# Patient Record
Sex: FEMALE | Race: WHITE | NOT HISPANIC OR LATINO | Employment: OTHER | URBAN - METROPOLITAN AREA
[De-identification: names, ages, dates, MRNs, and addresses within clinical notes are randomized per-mention and may not be internally consistent; named-entity substitution may affect disease eponyms.]

---

## 2017-01-03 ENCOUNTER — APPOINTMENT (OUTPATIENT)
Dept: LAB | Facility: CLINIC | Age: 67
End: 2017-01-03
Payer: MEDICARE

## 2017-01-03 DIAGNOSIS — C34.91 MALIGNANT NEOPLASM OF UNSPECIFIED PART OF RIGHT BRONCHUS OR LUNG (HCC): ICD-10-CM

## 2017-01-03 LAB
T3FREE SERPL-MCNC: 2.29 PG/ML (ref 2.3–4.2)
T4 FREE SERPL-MCNC: 1.51 NG/DL (ref 0.76–1.46)
TSH SERPL DL<=0.05 MIU/L-ACNC: 1.39 UIU/ML (ref 0.36–3.74)

## 2017-01-03 PROCEDURE — 84481 FREE ASSAY (FT-3): CPT

## 2017-01-03 PROCEDURE — 36415 COLL VENOUS BLD VENIPUNCTURE: CPT

## 2017-01-03 PROCEDURE — 84439 ASSAY OF FREE THYROXINE: CPT

## 2017-01-03 PROCEDURE — 84443 ASSAY THYROID STIM HORMONE: CPT

## 2017-01-04 ENCOUNTER — ALLSCRIPTS OFFICE VISIT (OUTPATIENT)
Dept: OTHER | Facility: OTHER | Age: 67
End: 2017-01-04

## 2017-01-05 ENCOUNTER — HOSPITAL ENCOUNTER (OUTPATIENT)
Dept: INFUSION CENTER | Facility: HOSPITAL | Age: 67
Discharge: HOME | End: 2017-01-05
Payer: MEDICARE

## 2017-01-05 ENCOUNTER — APPOINTMENT (OUTPATIENT)
Dept: RADIATION ONCOLOGY | Facility: HOSPITAL | Age: 67
End: 2017-01-05
Attending: RADIOLOGY
Payer: MEDICARE

## 2017-01-05 ENCOUNTER — GENERIC CONVERSION - ENCOUNTER (OUTPATIENT)
Dept: OTHER | Facility: OTHER | Age: 67
End: 2017-01-05

## 2017-01-05 VITALS
TEMPERATURE: 97.9 F | SYSTOLIC BLOOD PRESSURE: 137 MMHG | HEART RATE: 80 BPM | DIASTOLIC BLOOD PRESSURE: 75 MMHG | RESPIRATION RATE: 18 BRPM

## 2017-01-05 PROCEDURE — 96413 CHEMO IV INFUSION 1 HR: CPT

## 2017-01-05 PROCEDURE — 99213 OFFICE O/P EST LOW 20 MIN: CPT | Performed by: RADIOLOGY

## 2017-01-05 RX ORDER — SODIUM CHLORIDE 9 MG/ML
20 INJECTION, SOLUTION INTRAVENOUS ONCE
Status: COMPLETED | OUTPATIENT
Start: 2017-01-05 | End: 2017-01-05

## 2017-01-05 RX ORDER — SODIUM CHLORIDE 9 MG/ML
20 INJECTION, SOLUTION INTRAVENOUS ONCE
Status: DISCONTINUED | OUTPATIENT
Start: 2017-01-05 | End: 2017-01-05

## 2017-01-05 RX ADMIN — SODIUM CHLORIDE 20 ML/HR: 0.9 INJECTION, SOLUTION INTRAVENOUS at 11:30

## 2017-01-05 RX ADMIN — SODIUM CHLORIDE 200 MG: 900 INJECTION INTRAVENOUS at 11:27

## 2017-01-05 NOTE — PLAN OF CARE
Problem: Potential for Falls  Goal: Patient will remain free of falls  INTERVENTIONS:  - Assess patient frequently for physical needs  - Identify cognitive and physical deficits and behaviors that affect risk of falls    - [place] fall precautions as indicated by assessment   - Educate patient/family on patient safety including physical limitations  - Instruct patient to call for assistance with activity based on assessment  - Modify environment to reduce risk of injury  - Consider OT/PT consult to assist with strengthening/mobility   Outcome: Progressing

## 2017-01-10 ENCOUNTER — GENERIC CONVERSION - ENCOUNTER (OUTPATIENT)
Dept: OTHER | Facility: OTHER | Age: 67
End: 2017-01-10

## 2017-01-11 ENCOUNTER — APPOINTMENT (OUTPATIENT)
Dept: LAB | Facility: CLINIC | Age: 67
End: 2017-01-11
Payer: MEDICARE

## 2017-01-11 ENCOUNTER — GENERIC CONVERSION - ENCOUNTER (OUTPATIENT)
Dept: OTHER | Facility: OTHER | Age: 67
End: 2017-01-11

## 2017-01-11 ENCOUNTER — ALLSCRIPTS OFFICE VISIT (OUTPATIENT)
Dept: OTHER | Facility: OTHER | Age: 67
End: 2017-01-11

## 2017-01-11 ENCOUNTER — TRANSCRIBE ORDERS (OUTPATIENT)
Dept: LAB | Facility: CLINIC | Age: 67
End: 2017-01-11

## 2017-01-11 DIAGNOSIS — E11.9 TYPE 2 DIABETES MELLITUS WITHOUT COMPLICATIONS (HCC): ICD-10-CM

## 2017-01-11 LAB
EST. AVERAGE GLUCOSE BLD GHB EST-MCNC: 154 MG/DL
HBA1C MFR BLD: 7 % (ref 4.2–6.3)

## 2017-01-11 PROCEDURE — 36415 COLL VENOUS BLD VENIPUNCTURE: CPT

## 2017-01-11 PROCEDURE — 83036 HEMOGLOBIN GLYCOSYLATED A1C: CPT

## 2017-01-13 ENCOUNTER — GENERIC CONVERSION - ENCOUNTER (OUTPATIENT)
Dept: OTHER | Facility: OTHER | Age: 67
End: 2017-01-13

## 2017-01-18 ENCOUNTER — GENERIC CONVERSION - ENCOUNTER (OUTPATIENT)
Dept: OTHER | Facility: OTHER | Age: 67
End: 2017-01-18

## 2017-01-20 ENCOUNTER — ALLSCRIPTS OFFICE VISIT (OUTPATIENT)
Dept: OTHER | Facility: OTHER | Age: 67
End: 2017-01-20

## 2017-01-23 ENCOUNTER — APPOINTMENT (OUTPATIENT)
Dept: LAB | Facility: CLINIC | Age: 67
End: 2017-01-23
Payer: MEDICARE

## 2017-01-23 DIAGNOSIS — F41.9 ANXIETY DISORDER: ICD-10-CM

## 2017-01-23 DIAGNOSIS — C34.91 MALIGNANT NEOPLASM OF UNSPECIFIED PART OF RIGHT BRONCHUS OR LUNG (HCC): ICD-10-CM

## 2017-01-23 LAB
ALBUMIN SERPL BCP-MCNC: 2.9 G/DL (ref 3.5–5)
ALP SERPL-CCNC: 78 U/L (ref 46–116)
ALT SERPL W P-5'-P-CCNC: 17 U/L (ref 12–78)
ANION GAP SERPL CALCULATED.3IONS-SCNC: 8 MMOL/L (ref 4–13)
AST SERPL W P-5'-P-CCNC: 14 U/L (ref 5–45)
BASOPHILS # BLD AUTO: 0.03 THOUSANDS/ΜL (ref 0–0.1)
BASOPHILS NFR BLD AUTO: 1 % (ref 0–1)
BILIRUB SERPL-MCNC: 0.69 MG/DL (ref 0.2–1)
BUN SERPL-MCNC: 12 MG/DL (ref 5–25)
CALCIUM SERPL-MCNC: 9.1 MG/DL (ref 8.3–10.1)
CHLORIDE SERPL-SCNC: 103 MMOL/L (ref 100–108)
CO2 SERPL-SCNC: 27 MMOL/L (ref 21–32)
CREAT SERPL-MCNC: 0.72 MG/DL (ref 0.6–1.3)
EOSINOPHIL # BLD AUTO: 0.09 THOUSAND/ΜL (ref 0–0.61)
EOSINOPHIL NFR BLD AUTO: 1 % (ref 0–6)
ERYTHROCYTE [DISTWIDTH] IN BLOOD BY AUTOMATED COUNT: 16 % (ref 11.6–15.1)
GFR SERPL CREATININE-BSD FRML MDRD: >60 ML/MIN/1.73SQ M
GLUCOSE SERPL-MCNC: 158 MG/DL (ref 65–140)
HCT VFR BLD AUTO: 38.9 % (ref 34.8–46.1)
HGB BLD-MCNC: 12.2 G/DL (ref 11.5–15.4)
LYMPHOCYTES # BLD AUTO: 0.44 THOUSANDS/ΜL (ref 0.6–4.47)
LYMPHOCYTES NFR BLD AUTO: 7 % (ref 14–44)
MCH RBC QN AUTO: 25.6 PG (ref 26.8–34.3)
MCHC RBC AUTO-ENTMCNC: 31.4 G/DL (ref 31.4–37.4)
MCV RBC AUTO: 82 FL (ref 82–98)
MONOCYTES # BLD AUTO: 0.53 THOUSAND/ΜL (ref 0.17–1.22)
MONOCYTES NFR BLD AUTO: 8 % (ref 4–12)
NEUTROPHILS # BLD AUTO: 5.26 THOUSANDS/ΜL (ref 1.85–7.62)
NEUTS SEG NFR BLD AUTO: 83 % (ref 43–75)
NRBC BLD AUTO-RTO: 0 /100 WBCS
PLATELET # BLD AUTO: 265 THOUSANDS/UL (ref 149–390)
PMV BLD AUTO: 11.4 FL (ref 8.9–12.7)
POTASSIUM SERPL-SCNC: 3.6 MMOL/L (ref 3.5–5.3)
PROT SERPL-MCNC: 6.7 G/DL (ref 6.4–8.2)
RBC # BLD AUTO: 4.77 MILLION/UL (ref 3.81–5.12)
SODIUM SERPL-SCNC: 138 MMOL/L (ref 136–145)
WBC # BLD AUTO: 6.36 THOUSAND/UL (ref 4.31–10.16)

## 2017-01-23 PROCEDURE — 36415 COLL VENOUS BLD VENIPUNCTURE: CPT

## 2017-01-23 PROCEDURE — 85025 COMPLETE CBC W/AUTO DIFF WBC: CPT

## 2017-01-23 PROCEDURE — 80053 COMPREHEN METABOLIC PANEL: CPT

## 2017-01-25 ENCOUNTER — ALLSCRIPTS OFFICE VISIT (OUTPATIENT)
Dept: OTHER | Facility: OTHER | Age: 67
End: 2017-01-25

## 2017-01-26 ENCOUNTER — HOSPITAL ENCOUNTER (OUTPATIENT)
Dept: INFUSION CENTER | Facility: CLINIC | Age: 67
Discharge: HOME | End: 2017-01-26
Payer: MEDICARE

## 2017-01-26 VITALS
BODY MASS INDEX: 29.32 KG/M2 | SYSTOLIC BLOOD PRESSURE: 124 MMHG | RESPIRATION RATE: 16 BRPM | WEIGHT: 171.74 LBS | HEIGHT: 64 IN | DIASTOLIC BLOOD PRESSURE: 50 MMHG | HEART RATE: 57 BPM | TEMPERATURE: 97.5 F | OXYGEN SATURATION: 97 %

## 2017-01-26 PROCEDURE — 96413 CHEMO IV INFUSION 1 HR: CPT

## 2017-01-26 RX ORDER — SODIUM CHLORIDE 9 MG/ML
20 INJECTION, SOLUTION INTRAVENOUS CONTINUOUS
Status: DISCONTINUED | OUTPATIENT
Start: 2017-01-26 | End: 2017-01-29 | Stop reason: HOSPADM

## 2017-01-26 RX ORDER — ALBUTEROL SULFATE 90 UG/1
2 AEROSOL, METERED RESPIRATORY (INHALATION) EVERY 6 HOURS PRN
COMMUNITY
End: 2017-03-30 | Stop reason: ALTCHOICE

## 2017-01-26 RX ORDER — DOXYCYCLINE HYCLATE 100 MG/1
100 CAPSULE ORAL DAILY
COMMUNITY
End: 2017-03-30 | Stop reason: ALTCHOICE

## 2017-01-26 RX ADMIN — SODIUM CHLORIDE 20 ML/HR: 0.9 INJECTION, SOLUTION INTRAVENOUS at 10:50

## 2017-01-26 RX ADMIN — SODIUM CHLORIDE 200 MG: 9 INJECTION, SOLUTION INTRAVENOUS at 11:20

## 2017-01-26 NOTE — PLAN OF CARE
Problem: INFECTION - ADULT  Goal: Absence or prevention of progression during hospitalization  INTERVENTIONS:  - Assess and monitor for signs and symptoms of infection  - Monitor lab/diagnostic results  - Monitor all insertion sites, i e  indwelling lines, tubes, and drains  - Monitor endotracheal (as able) and nasal secretions for changes in amount and color  - [place] appropriate cooling/warming therapies per order  - Administer medications as ordered  - Instruct and encourage patient and family to use good hand hygiene technique  - Identify and instruct in appropriate isolation precautions for identified infection/condition  Outcome: Progressing  Goal: Absence of fever/infection during neutropenic period  INTERVENTIONS:  - Monitor WBC  - Implement neutropenic guidelines  Outcome: Progressing    Problem: Knowledge Deficit  Goal: Patient/family/caregiver demonstrates understanding of disease process, treatment plan, medications, and discharge instructions  Complete learning assessment and assess knowledge base    Interventions:  - Provide teaching at level of understanding  - Provide teaching via preferred learning methods  Outcome: Progressing

## 2017-01-27 ENCOUNTER — GENERIC CONVERSION - ENCOUNTER (OUTPATIENT)
Dept: OTHER | Facility: OTHER | Age: 67
End: 2017-01-27

## 2017-01-27 LAB
LEFT EYE DIABETIC RETINOPATHY: NORMAL
RIGHT EYE DIABETIC RETINOPATHY: NORMAL

## 2017-01-31 ENCOUNTER — HOSPITAL ENCOUNTER (OUTPATIENT)
Dept: CT IMAGING | Facility: HOSPITAL | Age: 67
Discharge: HOME | End: 2017-01-31
Attending: INTERNAL MEDICINE
Payer: MEDICARE

## 2017-01-31 DIAGNOSIS — C34.91 MALIGNANT NEOPLASM OF UNSPECIFIED PART OF RIGHT BRONCHUS OR LUNG (HCC): ICD-10-CM

## 2017-01-31 PROCEDURE — 71250 CT THORAX DX C-: CPT

## 2017-02-13 ENCOUNTER — APPOINTMENT (OUTPATIENT)
Dept: LAB | Facility: CLINIC | Age: 67
End: 2017-02-13
Payer: MEDICARE

## 2017-02-13 DIAGNOSIS — C34.91 MALIGNANT NEOPLASM OF UNSPECIFIED PART OF RIGHT BRONCHUS OR LUNG (HCC): ICD-10-CM

## 2017-02-13 LAB
ALBUMIN SERPL BCP-MCNC: 3 G/DL (ref 3.5–5)
ALP SERPL-CCNC: 78 U/L (ref 46–116)
ALT SERPL W P-5'-P-CCNC: 18 U/L (ref 12–78)
ANION GAP SERPL CALCULATED.3IONS-SCNC: 5 MMOL/L (ref 4–13)
AST SERPL W P-5'-P-CCNC: 13 U/L (ref 5–45)
BASOPHILS # BLD AUTO: 0.03 THOUSANDS/ΜL (ref 0–0.1)
BASOPHILS NFR BLD AUTO: 1 % (ref 0–1)
BILIRUB SERPL-MCNC: 0.4 MG/DL (ref 0.2–1)
BUN SERPL-MCNC: 16 MG/DL (ref 5–25)
CALCIUM SERPL-MCNC: 9.3 MG/DL (ref 8.3–10.1)
CEA SERPL-MCNC: 1 NG/ML (ref 0–3)
CHLORIDE SERPL-SCNC: 103 MMOL/L (ref 100–108)
CO2 SERPL-SCNC: 29 MMOL/L (ref 21–32)
CREAT SERPL-MCNC: 0.73 MG/DL (ref 0.6–1.3)
EOSINOPHIL # BLD AUTO: 0.14 THOUSAND/ΜL (ref 0–0.61)
EOSINOPHIL NFR BLD AUTO: 2 % (ref 0–6)
ERYTHROCYTE [DISTWIDTH] IN BLOOD BY AUTOMATED COUNT: 15.9 % (ref 11.6–15.1)
GFR SERPL CREATININE-BSD FRML MDRD: >60 ML/MIN/1.73SQ M
GLUCOSE SERPL-MCNC: 118 MG/DL (ref 65–140)
HCT VFR BLD AUTO: 39.9 % (ref 34.8–46.1)
HGB BLD-MCNC: 12.5 G/DL (ref 11.5–15.4)
LYMPHOCYTES # BLD AUTO: 0.68 THOUSANDS/ΜL (ref 0.6–4.47)
LYMPHOCYTES NFR BLD AUTO: 10 % (ref 14–44)
MCH RBC QN AUTO: 25.4 PG (ref 26.8–34.3)
MCHC RBC AUTO-ENTMCNC: 31.3 G/DL (ref 31.4–37.4)
MCV RBC AUTO: 81 FL (ref 82–98)
MONOCYTES # BLD AUTO: 0.44 THOUSAND/ΜL (ref 0.17–1.22)
MONOCYTES NFR BLD AUTO: 7 % (ref 4–12)
NEUTROPHILS # BLD AUTO: 5.34 THOUSANDS/ΜL (ref 1.85–7.62)
NEUTS SEG NFR BLD AUTO: 80 % (ref 43–75)
NRBC BLD AUTO-RTO: 0 /100 WBCS
PLATELET # BLD AUTO: 274 THOUSANDS/UL (ref 149–390)
PMV BLD AUTO: 11.1 FL (ref 8.9–12.7)
POTASSIUM SERPL-SCNC: 4.7 MMOL/L (ref 3.5–5.3)
PROT SERPL-MCNC: 6.8 G/DL (ref 6.4–8.2)
RBC # BLD AUTO: 4.92 MILLION/UL (ref 3.81–5.12)
SODIUM SERPL-SCNC: 137 MMOL/L (ref 136–145)
WBC # BLD AUTO: 6.66 THOUSAND/UL (ref 4.31–10.16)

## 2017-02-13 PROCEDURE — 82378 CARCINOEMBRYONIC ANTIGEN: CPT

## 2017-02-13 PROCEDURE — 36415 COLL VENOUS BLD VENIPUNCTURE: CPT

## 2017-02-13 PROCEDURE — 80053 COMPREHEN METABOLIC PANEL: CPT

## 2017-02-13 PROCEDURE — 85025 COMPLETE CBC W/AUTO DIFF WBC: CPT

## 2017-02-15 ENCOUNTER — ALLSCRIPTS OFFICE VISIT (OUTPATIENT)
Dept: OTHER | Facility: OTHER | Age: 67
End: 2017-02-15

## 2017-02-15 DIAGNOSIS — C34.91 MALIGNANT NEOPLASM OF UNSPECIFIED PART OF RIGHT BRONCHUS OR LUNG (HCC): ICD-10-CM

## 2017-02-15 RX ORDER — SODIUM CHLORIDE 9 MG/ML
20 INJECTION, SOLUTION INTRAVENOUS CONTINUOUS
Status: DISCONTINUED | OUTPATIENT
Start: 2017-02-16 | End: 2017-02-19 | Stop reason: HOSPADM

## 2017-02-16 ENCOUNTER — HOSPITAL ENCOUNTER (OUTPATIENT)
Dept: INFUSION CENTER | Facility: CLINIC | Age: 67
Discharge: HOME | End: 2017-02-16
Payer: MEDICARE

## 2017-02-16 VITALS
BODY MASS INDEX: 30.29 KG/M2 | OXYGEN SATURATION: 98 % | HEART RATE: 64 BPM | HEIGHT: 64 IN | SYSTOLIC BLOOD PRESSURE: 118 MMHG | WEIGHT: 177.4 LBS | DIASTOLIC BLOOD PRESSURE: 62 MMHG | TEMPERATURE: 97.4 F | RESPIRATION RATE: 18 BRPM

## 2017-02-16 PROCEDURE — 96413 CHEMO IV INFUSION 1 HR: CPT

## 2017-02-16 RX ADMIN — SODIUM CHLORIDE 200 MG: 9 INJECTION, SOLUTION INTRAVENOUS at 10:54

## 2017-02-16 RX ADMIN — SODIUM CHLORIDE 20 ML/HR: 900 INJECTION, SOLUTION INTRAVENOUS at 09:50

## 2017-03-06 ENCOUNTER — APPOINTMENT (OUTPATIENT)
Dept: LAB | Facility: CLINIC | Age: 67
End: 2017-03-06
Payer: MEDICARE

## 2017-03-06 ENCOUNTER — TRANSCRIBE ORDERS (OUTPATIENT)
Dept: LAB | Facility: CLINIC | Age: 67
End: 2017-03-06

## 2017-03-06 DIAGNOSIS — C34.91 MALIGNANT NEOPLASM OF UNSPECIFIED PART OF RIGHT BRONCHUS OR LUNG (HCC): ICD-10-CM

## 2017-03-06 LAB
ALBUMIN SERPL BCP-MCNC: 2.7 G/DL (ref 3.5–5)
ALP SERPL-CCNC: 90 U/L (ref 46–116)
ALT SERPL W P-5'-P-CCNC: 19 U/L (ref 12–78)
ANION GAP SERPL CALCULATED.3IONS-SCNC: 6 MMOL/L (ref 4–13)
AST SERPL W P-5'-P-CCNC: 16 U/L (ref 5–45)
BASOPHILS # BLD AUTO: 0.03 THOUSANDS/ΜL (ref 0–0.1)
BASOPHILS NFR BLD AUTO: 0 % (ref 0–1)
BILIRUB SERPL-MCNC: 0.6 MG/DL (ref 0.2–1)
BUN SERPL-MCNC: 18 MG/DL (ref 5–25)
CALCIUM SERPL-MCNC: 9 MG/DL (ref 8.3–10.1)
CHLORIDE SERPL-SCNC: 102 MMOL/L (ref 100–108)
CO2 SERPL-SCNC: 30 MMOL/L (ref 21–32)
CREAT SERPL-MCNC: 0.86 MG/DL (ref 0.6–1.3)
EOSINOPHIL # BLD AUTO: 0.16 THOUSAND/ΜL (ref 0–0.61)
EOSINOPHIL NFR BLD AUTO: 2 % (ref 0–6)
ERYTHROCYTE [DISTWIDTH] IN BLOOD BY AUTOMATED COUNT: 15.9 % (ref 11.6–15.1)
GFR SERPL CREATININE-BSD FRML MDRD: >60 ML/MIN/1.73SQ M
GLUCOSE SERPL-MCNC: 121 MG/DL (ref 65–140)
HCT VFR BLD AUTO: 38 % (ref 34.8–46.1)
HGB BLD-MCNC: 12 G/DL (ref 11.5–15.4)
LYMPHOCYTES # BLD AUTO: 0.71 THOUSANDS/ΜL (ref 0.6–4.47)
LYMPHOCYTES NFR BLD AUTO: 10 % (ref 14–44)
MCH RBC QN AUTO: 25.1 PG (ref 26.8–34.3)
MCHC RBC AUTO-ENTMCNC: 31.6 G/DL (ref 31.4–37.4)
MCV RBC AUTO: 79 FL (ref 82–98)
MONOCYTES # BLD AUTO: 0.51 THOUSAND/ΜL (ref 0.17–1.22)
MONOCYTES NFR BLD AUTO: 7 % (ref 4–12)
NEUTROPHILS # BLD AUTO: 5.97 THOUSANDS/ΜL (ref 1.85–7.62)
NEUTS SEG NFR BLD AUTO: 81 % (ref 43–75)
PLATELET # BLD AUTO: 269 THOUSANDS/UL (ref 149–390)
PMV BLD AUTO: 10.5 FL (ref 8.9–12.7)
POTASSIUM SERPL-SCNC: 4.2 MMOL/L (ref 3.5–5.3)
PROT SERPL-MCNC: 6.5 G/DL (ref 6.4–8.2)
RBC # BLD AUTO: 4.79 MILLION/UL (ref 3.81–5.12)
SODIUM SERPL-SCNC: 138 MMOL/L (ref 136–145)
TSH SERPL DL<=0.05 MIU/L-ACNC: 2.04 UIU/ML (ref 0.36–3.74)
WBC # BLD AUTO: 7.38 THOUSAND/UL (ref 4.31–10.16)

## 2017-03-06 PROCEDURE — 36415 COLL VENOUS BLD VENIPUNCTURE: CPT

## 2017-03-06 PROCEDURE — 80053 COMPREHEN METABOLIC PANEL: CPT

## 2017-03-06 PROCEDURE — 85025 COMPLETE CBC W/AUTO DIFF WBC: CPT

## 2017-03-06 PROCEDURE — 84443 ASSAY THYROID STIM HORMONE: CPT

## 2017-03-08 RX ORDER — SODIUM CHLORIDE 9 MG/ML
20 INJECTION, SOLUTION INTRAVENOUS CONTINUOUS
Status: DISCONTINUED | OUTPATIENT
Start: 2017-03-09 | End: 2017-03-12 | Stop reason: HOSPADM

## 2017-03-09 ENCOUNTER — HOSPITAL ENCOUNTER (OUTPATIENT)
Dept: INFUSION CENTER | Facility: CLINIC | Age: 67
Discharge: HOME | End: 2017-03-09
Payer: MEDICARE

## 2017-03-09 VITALS
BODY MASS INDEX: 30.76 KG/M2 | RESPIRATION RATE: 18 BRPM | HEART RATE: 61 BPM | DIASTOLIC BLOOD PRESSURE: 70 MMHG | OXYGEN SATURATION: 94 % | TEMPERATURE: 98.1 F | SYSTOLIC BLOOD PRESSURE: 126 MMHG | WEIGHT: 179.2 LBS

## 2017-03-09 PROCEDURE — 96413 CHEMO IV INFUSION 1 HR: CPT

## 2017-03-09 RX ADMIN — SODIUM CHLORIDE 20 ML/HR: 0.9 INJECTION, SOLUTION INTRAVENOUS at 10:15

## 2017-03-09 RX ADMIN — SODIUM CHLORIDE 200 MG: 9 INJECTION, SOLUTION INTRAVENOUS at 11:14

## 2017-03-13 ENCOUNTER — GENERIC CONVERSION - ENCOUNTER (OUTPATIENT)
Dept: OTHER | Facility: OTHER | Age: 67
End: 2017-03-13

## 2017-03-13 ENCOUNTER — ALLSCRIPTS OFFICE VISIT (OUTPATIENT)
Dept: OTHER | Facility: OTHER | Age: 67
End: 2017-03-13

## 2017-03-16 ENCOUNTER — ALLSCRIPTS OFFICE VISIT (OUTPATIENT)
Dept: OTHER | Facility: OTHER | Age: 67
End: 2017-03-16

## 2017-03-27 ENCOUNTER — APPOINTMENT (OUTPATIENT)
Dept: LAB | Facility: CLINIC | Age: 67
End: 2017-03-27
Payer: MEDICARE

## 2017-03-27 DIAGNOSIS — C34.91 MALIGNANT NEOPLASM OF UNSPECIFIED PART OF RIGHT BRONCHUS OR LUNG (HCC): ICD-10-CM

## 2017-03-27 LAB
ALBUMIN SERPL BCP-MCNC: 2.5 G/DL (ref 3.5–5)
ALP SERPL-CCNC: 96 U/L (ref 46–116)
ALT SERPL W P-5'-P-CCNC: 16 U/L (ref 12–78)
ANION GAP SERPL CALCULATED.3IONS-SCNC: 4 MMOL/L (ref 4–13)
AST SERPL W P-5'-P-CCNC: 16 U/L (ref 5–45)
BASOPHILS # BLD AUTO: 0.04 THOUSANDS/ΜL (ref 0–0.1)
BASOPHILS NFR BLD AUTO: 1 % (ref 0–1)
BILIRUB SERPL-MCNC: 0.4 MG/DL (ref 0.2–1)
BUN SERPL-MCNC: 15 MG/DL (ref 5–25)
CALCIUM SERPL-MCNC: 8.8 MG/DL (ref 8.3–10.1)
CHLORIDE SERPL-SCNC: 104 MMOL/L (ref 100–108)
CO2 SERPL-SCNC: 32 MMOL/L (ref 21–32)
CREAT SERPL-MCNC: 0.75 MG/DL (ref 0.6–1.3)
EOSINOPHIL # BLD AUTO: 0.16 THOUSAND/ΜL (ref 0–0.61)
EOSINOPHIL NFR BLD AUTO: 2 % (ref 0–6)
ERYTHROCYTE [DISTWIDTH] IN BLOOD BY AUTOMATED COUNT: 15.4 % (ref 11.6–15.1)
GFR SERPL CREATININE-BSD FRML MDRD: >60 ML/MIN/1.73SQ M
GLUCOSE P FAST SERPL-MCNC: 132 MG/DL (ref 65–99)
HCT VFR BLD AUTO: 37 % (ref 34.8–46.1)
HGB BLD-MCNC: 11.6 G/DL (ref 11.5–15.4)
LYMPHOCYTES # BLD AUTO: 0.86 THOUSANDS/ΜL (ref 0.6–4.47)
LYMPHOCYTES NFR BLD AUTO: 12 % (ref 14–44)
MCH RBC QN AUTO: 25 PG (ref 26.8–34.3)
MCHC RBC AUTO-ENTMCNC: 31.4 G/DL (ref 31.4–37.4)
MCV RBC AUTO: 80 FL (ref 82–98)
MONOCYTES # BLD AUTO: 0.45 THOUSAND/ΜL (ref 0.17–1.22)
MONOCYTES NFR BLD AUTO: 7 % (ref 4–12)
NEUTROPHILS # BLD AUTO: 5.45 THOUSANDS/ΜL (ref 1.85–7.62)
NEUTS SEG NFR BLD AUTO: 78 % (ref 43–75)
PLATELET # BLD AUTO: 304 THOUSANDS/UL (ref 149–390)
PMV BLD AUTO: 10.6 FL (ref 8.9–12.7)
POTASSIUM SERPL-SCNC: 4 MMOL/L (ref 3.5–5.3)
PROT SERPL-MCNC: 6.7 G/DL (ref 6.4–8.2)
RBC # BLD AUTO: 4.64 MILLION/UL (ref 3.81–5.12)
SODIUM SERPL-SCNC: 140 MMOL/L (ref 136–145)
WBC # BLD AUTO: 6.96 THOUSAND/UL (ref 4.31–10.16)

## 2017-03-27 PROCEDURE — 80053 COMPREHEN METABOLIC PANEL: CPT

## 2017-03-27 PROCEDURE — 85025 COMPLETE CBC W/AUTO DIFF WBC: CPT

## 2017-03-27 PROCEDURE — 36415 COLL VENOUS BLD VENIPUNCTURE: CPT

## 2017-03-29 ENCOUNTER — ALLSCRIPTS OFFICE VISIT (OUTPATIENT)
Dept: OTHER | Facility: OTHER | Age: 67
End: 2017-03-29

## 2017-03-29 ENCOUNTER — TRANSCRIBE ORDERS (OUTPATIENT)
Dept: ADMINISTRATIVE | Facility: HOSPITAL | Age: 67
End: 2017-03-29

## 2017-03-29 DIAGNOSIS — C34.91 PRIMARY LUNG CANCER WITH METASTASIS FROM LUNG TO OTHER SITE, RIGHT (HCC): Primary | ICD-10-CM

## 2017-03-30 ENCOUNTER — HOSPITAL ENCOUNTER (OUTPATIENT)
Dept: INFUSION CENTER | Facility: CLINIC | Age: 67
Discharge: HOME | End: 2017-03-30
Payer: MEDICARE

## 2017-03-30 VITALS
OXYGEN SATURATION: 95 % | RESPIRATION RATE: 20 BRPM | DIASTOLIC BLOOD PRESSURE: 58 MMHG | HEART RATE: 59 BPM | WEIGHT: 181.2 LBS | BODY MASS INDEX: 30.19 KG/M2 | SYSTOLIC BLOOD PRESSURE: 112 MMHG | TEMPERATURE: 98.7 F | HEIGHT: 65 IN

## 2017-03-30 PROCEDURE — 96413 CHEMO IV INFUSION 1 HR: CPT

## 2017-03-30 RX ORDER — SODIUM CHLORIDE 9 MG/ML
20 INJECTION, SOLUTION INTRAVENOUS CONTINUOUS
Status: DISCONTINUED | OUTPATIENT
Start: 2017-03-30 | End: 2017-04-02 | Stop reason: HOSPADM

## 2017-03-30 RX ADMIN — SODIUM CHLORIDE 20 ML/HR: 0.9 INJECTION, SOLUTION INTRAVENOUS at 10:05

## 2017-03-30 RX ADMIN — SODIUM CHLORIDE 200 MG: 9 INJECTION, SOLUTION INTRAVENOUS at 10:21

## 2017-04-17 DIAGNOSIS — C34.91 MALIGNANT NEOPLASM OF UNSPECIFIED PART OF RIGHT BRONCHUS OR LUNG (HCC): ICD-10-CM

## 2017-04-19 RX ORDER — SODIUM CHLORIDE 9 MG/ML
20 INJECTION, SOLUTION INTRAVENOUS CONTINUOUS
Status: DISCONTINUED | OUTPATIENT
Start: 2017-04-20 | End: 2017-04-23 | Stop reason: HOSPADM

## 2017-04-20 ENCOUNTER — HOSPITAL ENCOUNTER (OUTPATIENT)
Dept: INFUSION CENTER | Facility: CLINIC | Age: 67
Discharge: HOME | End: 2017-04-20
Payer: MEDICARE

## 2017-04-20 VITALS
SYSTOLIC BLOOD PRESSURE: 108 MMHG | OXYGEN SATURATION: 92 % | BODY MASS INDEX: 30.64 KG/M2 | DIASTOLIC BLOOD PRESSURE: 62 MMHG | RESPIRATION RATE: 16 BRPM | HEART RATE: 61 BPM | TEMPERATURE: 97.6 F | WEIGHT: 179.45 LBS | HEIGHT: 64 IN

## 2017-04-20 PROCEDURE — 96413 CHEMO IV INFUSION 1 HR: CPT

## 2017-04-20 RX ADMIN — SODIUM CHLORIDE 20 ML/HR: 900 INJECTION, SOLUTION INTRAVENOUS at 10:10

## 2017-04-20 RX ADMIN — SODIUM CHLORIDE 200 MG: 9 INJECTION, SOLUTION INTRAVENOUS at 11:05

## 2017-05-01 ENCOUNTER — APPOINTMENT (OUTPATIENT)
Dept: LAB | Facility: CLINIC | Age: 67
End: 2017-05-01
Payer: MEDICARE

## 2017-05-01 ENCOUNTER — HOSPITAL ENCOUNTER (OUTPATIENT)
Dept: CT IMAGING | Facility: HOSPITAL | Age: 67
Discharge: HOME | End: 2017-05-01
Attending: INTERNAL MEDICINE
Payer: MEDICARE

## 2017-05-01 ENCOUNTER — TRANSCRIBE ORDERS (OUTPATIENT)
Dept: LAB | Facility: CLINIC | Age: 67
End: 2017-05-01

## 2017-05-01 DIAGNOSIS — J68.0: ICD-10-CM

## 2017-05-01 DIAGNOSIS — C34.91 MALIGNANT NEOPLASM OF UNSPECIFIED PART OF RIGHT BRONCHUS OR LUNG (HCC): ICD-10-CM

## 2017-05-01 LAB
ALBUMIN SERPL BCP-MCNC: 2.3 G/DL (ref 3.5–5)
ALP SERPL-CCNC: 121 U/L (ref 46–116)
ALT SERPL W P-5'-P-CCNC: 17 U/L (ref 12–78)
ANION GAP SERPL CALCULATED.3IONS-SCNC: 7 MMOL/L (ref 4–13)
AST SERPL W P-5'-P-CCNC: 18 U/L (ref 5–45)
BASOPHILS # BLD AUTO: 0.03 THOUSANDS/ΜL (ref 0–0.1)
BASOPHILS NFR BLD AUTO: 0 % (ref 0–1)
BILIRUB SERPL-MCNC: 0.6 MG/DL (ref 0.2–1)
BUN SERPL-MCNC: 11 MG/DL (ref 5–25)
CALCIUM SERPL-MCNC: 8.8 MG/DL (ref 8.3–10.1)
CHLORIDE SERPL-SCNC: 102 MMOL/L (ref 100–108)
CO2 SERPL-SCNC: 29 MMOL/L (ref 21–32)
CREAT SERPL-MCNC: 0.83 MG/DL (ref 0.6–1.3)
EOSINOPHIL # BLD AUTO: 0.14 THOUSAND/ΜL (ref 0–0.61)
EOSINOPHIL NFR BLD AUTO: 2 % (ref 0–6)
ERYTHROCYTE [DISTWIDTH] IN BLOOD BY AUTOMATED COUNT: 14.2 % (ref 11.6–15.1)
GFR SERPL CREATININE-BSD FRML MDRD: >60 ML/MIN/1.73SQ M
GLUCOSE SERPL-MCNC: 146 MG/DL (ref 65–140)
HCT VFR BLD AUTO: 36 % (ref 34.8–46.1)
HGB BLD-MCNC: 11.4 G/DL (ref 11.5–15.4)
LYMPHOCYTES # BLD AUTO: 0.59 THOUSANDS/ΜL (ref 0.6–4.47)
LYMPHOCYTES NFR BLD AUTO: 7 % (ref 14–44)
MCH RBC QN AUTO: 24.2 PG (ref 26.8–34.3)
MCHC RBC AUTO-ENTMCNC: 31.7 G/DL (ref 31.4–37.4)
MCV RBC AUTO: 76 FL (ref 82–98)
MONOCYTES # BLD AUTO: 0.59 THOUSAND/ΜL (ref 0.17–1.22)
MONOCYTES NFR BLD AUTO: 7 % (ref 4–12)
NEUTROPHILS # BLD AUTO: 7.64 THOUSANDS/ΜL (ref 1.85–7.62)
NEUTS SEG NFR BLD AUTO: 84 % (ref 43–75)
PLATELET # BLD AUTO: 428 THOUSANDS/UL (ref 149–390)
PMV BLD AUTO: 10.1 FL (ref 8.9–12.7)
POTASSIUM SERPL-SCNC: 4.1 MMOL/L (ref 3.5–5.3)
PROT SERPL-MCNC: 6.8 G/DL (ref 6.4–8.2)
RBC # BLD AUTO: 4.72 MILLION/UL (ref 3.81–5.12)
SODIUM SERPL-SCNC: 138 MMOL/L (ref 136–145)
TSH SERPL DL<=0.05 MIU/L-ACNC: 0.89 UIU/ML (ref 0.36–3.74)
WBC # BLD AUTO: 8.99 THOUSAND/UL (ref 4.31–10.16)

## 2017-05-01 PROCEDURE — 36415 COLL VENOUS BLD VENIPUNCTURE: CPT

## 2017-05-01 PROCEDURE — 85025 COMPLETE CBC W/AUTO DIFF WBC: CPT

## 2017-05-01 PROCEDURE — 80053 COMPREHEN METABOLIC PANEL: CPT

## 2017-05-01 PROCEDURE — 84443 ASSAY THYROID STIM HORMONE: CPT

## 2017-05-01 PROCEDURE — 71250 CT THORAX DX C-: CPT

## 2017-05-08 ENCOUNTER — ALLSCRIPTS OFFICE VISIT (OUTPATIENT)
Dept: OTHER | Facility: OTHER | Age: 67
End: 2017-05-08

## 2017-05-11 ENCOUNTER — TRANSCRIBE ORDERS (OUTPATIENT)
Dept: ADMINISTRATIVE | Facility: HOSPITAL | Age: 67
End: 2017-05-11

## 2017-05-11 ENCOUNTER — ALLSCRIPTS OFFICE VISIT (OUTPATIENT)
Dept: OTHER | Facility: OTHER | Age: 67
End: 2017-05-11

## 2017-05-11 ENCOUNTER — HOSPITAL ENCOUNTER (OUTPATIENT)
Dept: RADIOLOGY | Age: 67
Discharge: HOME | End: 2017-05-11
Payer: MEDICARE

## 2017-05-11 DIAGNOSIS — R05.9 COUGH: Primary | ICD-10-CM

## 2017-05-11 DIAGNOSIS — C34.91 PRIMARY LUNG CANCER, RIGHT (HCC): ICD-10-CM

## 2017-05-11 LAB — GLUCOSE SERPL-MCNC: 163 MG/DL (ref 65–140)

## 2017-05-11 PROCEDURE — A9552 F18 FDG: HCPCS

## 2017-05-11 PROCEDURE — 82948 REAGENT STRIP/BLOOD GLUCOSE: CPT

## 2017-05-11 PROCEDURE — 78815 PET IMAGE W/CT SKULL-THIGH: CPT

## 2017-05-11 RX ADMIN — IOHEXOL 5 ML: 240 INJECTION, SOLUTION INTRATHECAL; INTRAVASCULAR; INTRAVENOUS; ORAL at 07:18

## 2017-05-15 ENCOUNTER — GENERIC CONVERSION - ENCOUNTER (OUTPATIENT)
Dept: OTHER | Facility: OTHER | Age: 67
End: 2017-05-15

## 2017-05-17 ENCOUNTER — ALLSCRIPTS OFFICE VISIT (OUTPATIENT)
Dept: OTHER | Facility: OTHER | Age: 67
End: 2017-05-17

## 2017-05-19 ENCOUNTER — HOSPITAL ENCOUNTER (OUTPATIENT)
Dept: PULMONOLOGY | Facility: HOSPITAL | Age: 67
Discharge: HOME | End: 2017-05-19
Attending: INTERNAL MEDICINE
Payer: MEDICARE

## 2017-05-19 ENCOUNTER — GENERIC CONVERSION - ENCOUNTER (OUTPATIENT)
Dept: OTHER | Facility: OTHER | Age: 67
End: 2017-05-19

## 2017-05-19 DIAGNOSIS — R05.9 COUGH: ICD-10-CM

## 2017-05-19 PROCEDURE — 94726 PLETHYSMOGRAPHY LUNG VOLUMES: CPT

## 2017-05-19 PROCEDURE — 94729 DIFFUSING CAPACITY: CPT

## 2017-05-19 PROCEDURE — 94760 N-INVAS EAR/PLS OXIMETRY 1: CPT

## 2017-05-19 PROCEDURE — 94060 EVALUATION OF WHEEZING: CPT

## 2017-05-19 RX ORDER — ALBUTEROL SULFATE 2.5 MG/3ML
SOLUTION RESPIRATORY (INHALATION)
Status: DISPENSED
Start: 2017-05-19 | End: 2017-05-19

## 2017-05-19 RX ORDER — ALBUTEROL SULFATE 2.5 MG/3ML
2.5 SOLUTION RESPIRATORY (INHALATION) ONCE
Status: COMPLETED | OUTPATIENT
Start: 2017-05-19 | End: 2017-05-19

## 2017-05-19 RX ADMIN — ALBUTEROL SULFATE 2.5 MG: 2.5 SOLUTION RESPIRATORY (INHALATION) at 09:17

## 2017-05-22 ENCOUNTER — GENERIC CONVERSION - ENCOUNTER (OUTPATIENT)
Dept: OTHER | Facility: OTHER | Age: 67
End: 2017-05-22

## 2017-05-24 ENCOUNTER — ANESTHESIA EVENT (OUTPATIENT)
Dept: GASTROENTEROLOGY | Facility: HOSPITAL | Age: 67
End: 2017-05-24
Payer: MEDICARE

## 2017-05-24 ENCOUNTER — HOSPITAL ENCOUNTER (OUTPATIENT)
Facility: HOSPITAL | Age: 67
Setting detail: OUTPATIENT SURGERY
Discharge: HOME | End: 2017-05-24
Attending: INTERNAL MEDICINE | Admitting: INTERNAL MEDICINE
Payer: MEDICARE

## 2017-05-24 ENCOUNTER — APPOINTMENT (OUTPATIENT)
Dept: RADIOLOGY | Facility: HOSPITAL | Age: 67
End: 2017-05-24
Payer: MEDICARE

## 2017-05-24 ENCOUNTER — ANESTHESIA (OUTPATIENT)
Dept: GASTROENTEROLOGY | Facility: HOSPITAL | Age: 67
End: 2017-05-24
Payer: MEDICARE

## 2017-05-24 VITALS
OXYGEN SATURATION: 94 % | TEMPERATURE: 98.7 F | HEIGHT: 65 IN | BODY MASS INDEX: 29.82 KG/M2 | SYSTOLIC BLOOD PRESSURE: 140 MMHG | HEART RATE: 67 BPM | RESPIRATION RATE: 16 BRPM | DIASTOLIC BLOOD PRESSURE: 66 MMHG | WEIGHT: 179 LBS

## 2017-05-24 DIAGNOSIS — R91.8 ABNORMAL CT SCAN OF LUNG: ICD-10-CM

## 2017-05-24 PROBLEM — R93.89 ABNORMAL CHEST X-RAY: Status: ACTIVE | Noted: 2017-05-24

## 2017-05-24 PROBLEM — I48.91 ATRIAL FIBRILLATION (HCC): Status: ACTIVE | Noted: 2017-05-24

## 2017-05-24 PROBLEM — C34.91 MALIGNANT NEOPLASM OF UNSPECIFIED PART OF RIGHT BRONCHUS OR LUNG (HCC): Status: ACTIVE | Noted: 2017-05-24

## 2017-05-24 PROBLEM — I10 BENIGN ESSENTIAL HYPERTENSION: Status: ACTIVE | Noted: 2017-05-24

## 2017-05-24 PROBLEM — C41.9 MALIGNANT NEOPLASM OF BONE (HCC): Status: ACTIVE | Noted: 2017-05-24

## 2017-05-24 PROBLEM — E11.9 CONTROLLED TYPE 2 DIABETES MELLITUS WITHOUT COMPLICATION (HCC): Status: ACTIVE | Noted: 2017-05-24

## 2017-05-24 PROCEDURE — 87116 MYCOBACTERIA CULTURE: CPT | Performed by: INTERNAL MEDICINE

## 2017-05-24 PROCEDURE — 87206 SMEAR FLUORESCENT/ACID STAI: CPT | Performed by: INTERNAL MEDICINE

## 2017-05-24 PROCEDURE — 88305 TISSUE EXAM BY PATHOLOGIST: CPT | Performed by: INTERNAL MEDICINE

## 2017-05-24 PROCEDURE — 88184 FLOWCYTOMETRY/ TC 1 MARKER: CPT | Performed by: INTERNAL MEDICINE

## 2017-05-24 PROCEDURE — 88112 CYTOPATH CELL ENHANCE TECH: CPT | Performed by: INTERNAL MEDICINE

## 2017-05-24 PROCEDURE — 87070 CULTURE OTHR SPECIMN AEROBIC: CPT | Performed by: INTERNAL MEDICINE

## 2017-05-24 PROCEDURE — 87252 VIRUS INOCULATION TISSUE: CPT | Performed by: INTERNAL MEDICINE

## 2017-05-24 PROCEDURE — 87107 FUNGI IDENTIFICATION MOLD: CPT | Performed by: INTERNAL MEDICINE

## 2017-05-24 PROCEDURE — 71010 HB CHEST X-RAY 1 VIEW FRONTAL (PORTABLE): CPT

## 2017-05-24 PROCEDURE — 87102 FUNGUS ISOLATION CULTURE: CPT | Performed by: INTERNAL MEDICINE

## 2017-05-24 PROCEDURE — 88185 FLOWCYTOMETRY/TC ADD-ON: CPT | Performed by: INTERNAL MEDICINE

## 2017-05-24 RX ORDER — SODIUM CHLORIDE 9 MG/ML
75 INJECTION, SOLUTION INTRAVENOUS CONTINUOUS
Status: DISCONTINUED | OUTPATIENT
Start: 2017-05-24 | End: 2017-05-24 | Stop reason: HOSPADM

## 2017-05-24 RX ORDER — PREDNISONE 20 MG/1
20 TABLET ORAL DAILY
Status: ON HOLD | COMMUNITY
End: 2017-09-13 | Stop reason: ALTCHOICE

## 2017-05-24 RX ORDER — SODIUM CHLORIDE 9 MG/ML
50 INJECTION, SOLUTION INTRAVENOUS CONTINUOUS
Status: DISCONTINUED | OUTPATIENT
Start: 2017-05-24 | End: 2017-05-24 | Stop reason: HOSPADM

## 2017-05-24 RX ORDER — PROPOFOL 10 MG/ML
INJECTION, EMULSION INTRAVENOUS AS NEEDED
Status: DISCONTINUED | OUTPATIENT
Start: 2017-05-24 | End: 2017-05-24 | Stop reason: SURG

## 2017-05-24 RX ORDER — MIDAZOLAM HYDROCHLORIDE 1 MG/ML
INJECTION INTRAMUSCULAR; INTRAVENOUS AS NEEDED
Status: DISCONTINUED | OUTPATIENT
Start: 2017-05-24 | End: 2017-05-24 | Stop reason: SURG

## 2017-05-24 RX ADMIN — PROPOFOL 50 MG: 10 INJECTION, EMULSION INTRAVENOUS at 12:35

## 2017-05-24 RX ADMIN — PROPOFOL 50 MG: 10 INJECTION, EMULSION INTRAVENOUS at 12:41

## 2017-05-24 RX ADMIN — PROPOFOL 50 MG: 10 INJECTION, EMULSION INTRAVENOUS at 12:46

## 2017-05-24 RX ADMIN — SODIUM CHLORIDE: 0.9 INJECTION, SOLUTION INTRAVENOUS at 11:45

## 2017-05-24 RX ADMIN — PROPOFOL 50 MG: 10 INJECTION, EMULSION INTRAVENOUS at 12:21

## 2017-05-24 RX ADMIN — PROPOFOL 50 MG: 10 INJECTION, EMULSION INTRAVENOUS at 12:16

## 2017-05-24 RX ADMIN — MIDAZOLAM HYDROCHLORIDE 2 MG: 1 INJECTION, SOLUTION INTRAMUSCULAR; INTRAVENOUS at 12:10

## 2017-05-24 RX ADMIN — PROPOFOL 50 MG: 10 INJECTION, EMULSION INTRAVENOUS at 12:29

## 2017-05-24 RX ADMIN — PROPOFOL 50 MG: 10 INJECTION, EMULSION INTRAVENOUS at 12:25

## 2017-05-25 ENCOUNTER — ALLSCRIPTS OFFICE VISIT (OUTPATIENT)
Dept: OTHER | Facility: OTHER | Age: 67
End: 2017-05-25

## 2017-05-27 LAB
BACTERIA BRONCH AEROBE CULT: NORMAL
GRAM STN SPEC: NORMAL

## 2017-05-29 LAB
BACTERIA BRONCH AEROBE CULT: NORMAL
GRAM STN SPEC: NORMAL

## 2017-05-31 ENCOUNTER — GENERIC CONVERSION - ENCOUNTER (OUTPATIENT)
Dept: OTHER | Facility: OTHER | Age: 67
End: 2017-05-31

## 2017-05-31 LAB — SCAN RESULT: NORMAL

## 2017-05-31 RX ORDER — SODIUM CHLORIDE 9 MG/ML
20 INJECTION, SOLUTION INTRAVENOUS CONTINUOUS
Status: DISCONTINUED | OUTPATIENT
Start: 2017-06-01 | End: 2017-06-04 | Stop reason: HOSPADM

## 2017-06-01 ENCOUNTER — TRANSCRIBE ORDERS (OUTPATIENT)
Dept: ADMINISTRATIVE | Facility: HOSPITAL | Age: 67
End: 2017-06-01

## 2017-06-01 ENCOUNTER — HOSPITAL ENCOUNTER (OUTPATIENT)
Dept: INFUSION CENTER | Facility: CLINIC | Age: 67
Discharge: HOME | End: 2017-06-01
Payer: MEDICARE

## 2017-06-01 VITALS
RESPIRATION RATE: 16 BRPM | TEMPERATURE: 98.4 F | DIASTOLIC BLOOD PRESSURE: 62 MMHG | HEART RATE: 67 BPM | OXYGEN SATURATION: 91 % | SYSTOLIC BLOOD PRESSURE: 126 MMHG

## 2017-06-01 DIAGNOSIS — R93.89 ABNORMAL RADIOLOGICAL FINDINGS IN SKIN AND SUBCUTANEOUS TISSUE: Primary | ICD-10-CM

## 2017-06-01 LAB — VIRUS SPEC CULT: NORMAL

## 2017-06-01 PROCEDURE — 96413 CHEMO IV INFUSION 1 HR: CPT

## 2017-06-01 RX ADMIN — SODIUM CHLORIDE 200 MG: 9 INJECTION, SOLUTION INTRAVENOUS at 10:37

## 2017-06-01 RX ADMIN — SODIUM CHLORIDE 20 ML/HR: 0.9 INJECTION, SOLUTION INTRAVENOUS at 10:20

## 2017-06-01 NOTE — PLAN OF CARE
Problem: Potential for Falls  Goal: Patient will remain free of falls  INTERVENTIONS:  - Assess patient frequently for physical needs  - Identify cognitive and physical deficits and behaviors that affect risk of falls    - [place] fall precautions as indicated by assessment   - Educate patient/family on patient safety including physical limitations  - Instruct patient to call for assistance with activity based on assessment  - Modify environment to reduce risk of injury  - Consider OT/PT consult to assist with strengthening/mobility   Outcome: Progressing

## 2017-06-01 NOTE — PROGRESS NOTES
Patient here for pembrolizumab, confirmed with Eryn Butler RN that patient is to restart as last office note states to hold for now, patient did have right lung biopsy which was negative for progression so patient is restarting treatment  Also confirmed with Will Kurtz that patient does not need labs drawn

## 2017-06-01 NOTE — PROGRESS NOTES
Patient tolerated treatment well and discharged, no c/o offered  She will RTO in 3 weeks for next dosing

## 2017-06-05 ENCOUNTER — GENERIC CONVERSION - ENCOUNTER (OUTPATIENT)
Dept: OTHER | Facility: OTHER | Age: 67
End: 2017-06-05

## 2017-06-07 ENCOUNTER — ALLSCRIPTS OFFICE VISIT (OUTPATIENT)
Dept: OTHER | Facility: OTHER | Age: 67
End: 2017-06-07

## 2017-06-07 DIAGNOSIS — C41.9 MALIGNANT NEOPLASM OF BONE AND ARTICULAR CARTILAGE (HCC): ICD-10-CM

## 2017-06-14 ENCOUNTER — ALLSCRIPTS OFFICE VISIT (OUTPATIENT)
Dept: OTHER | Facility: OTHER | Age: 67
End: 2017-06-14

## 2017-06-30 LAB — LABORATORY COMMENT REPORT: NORMAL

## 2017-07-11 LAB
MYCOBACTERIUM SPEC CULT: NORMAL
MYCOBACTERIUM SPEC CULT: NORMAL
RHODAMINE-AURAMINE STN SPEC: NORMAL
RHODAMINE-AURAMINE STN SPEC: NORMAL

## 2017-07-13 LAB
FUNGUS SPEC CULT: NORMAL
FUNGUS SPEC CULT: NORMAL

## 2017-07-26 DIAGNOSIS — R93.89 ABNORMAL FINDINGS ON DIAGNOSTIC IMAGING OF OTHER SPECIFIED BODY STRUCTURES: ICD-10-CM

## 2017-08-01 DIAGNOSIS — I10 ESSENTIAL (PRIMARY) HYPERTENSION: ICD-10-CM

## 2017-08-01 DIAGNOSIS — I48.91 ATRIAL FIBRILLATION (HCC): ICD-10-CM

## 2017-08-01 DIAGNOSIS — E55.9 VITAMIN D DEFICIENCY: ICD-10-CM

## 2017-08-01 DIAGNOSIS — Z12.11 ENCOUNTER FOR SCREENING FOR MALIGNANT NEOPLASM OF COLON: ICD-10-CM

## 2017-08-01 DIAGNOSIS — M25.512 PAIN IN LEFT SHOULDER: ICD-10-CM

## 2017-08-01 DIAGNOSIS — C34.91 MALIGNANT NEOPLASM OF UNSPECIFIED PART OF RIGHT BRONCHUS OR LUNG (HCC): ICD-10-CM

## 2017-08-01 DIAGNOSIS — E11.9 TYPE 2 DIABETES MELLITUS WITHOUT COMPLICATIONS (HCC): ICD-10-CM

## 2017-08-09 ENCOUNTER — HOSPITAL ENCOUNTER (OUTPATIENT)
Dept: RADIOLOGY | Age: 67
Discharge: HOME | End: 2017-08-09
Payer: MEDICARE

## 2017-08-09 DIAGNOSIS — C41.9 MALIGNANT NEOPLASM OF BONE AND ARTICULAR CARTILAGE (HCC): ICD-10-CM

## 2017-08-09 LAB — GLUCOSE SERPL-MCNC: 144 MG/DL (ref 65–140)

## 2017-08-09 PROCEDURE — 78815 PET IMAGE W/CT SKULL-THIGH: CPT

## 2017-08-09 PROCEDURE — 82948 REAGENT STRIP/BLOOD GLUCOSE: CPT

## 2017-08-09 PROCEDURE — A9552 F18 FDG: HCPCS

## 2017-08-09 RX ADMIN — IOHEXOL 5 ML: 240 INJECTION, SOLUTION INTRATHECAL; INTRAVASCULAR; INTRAVENOUS; ORAL at 07:37

## 2017-08-14 ENCOUNTER — APPOINTMENT (OUTPATIENT)
Dept: LAB | Facility: CLINIC | Age: 67
End: 2017-08-14
Payer: MEDICARE

## 2017-08-14 ENCOUNTER — GENERIC CONVERSION - ENCOUNTER (OUTPATIENT)
Dept: OTHER | Facility: OTHER | Age: 67
End: 2017-08-14

## 2017-08-14 DIAGNOSIS — E55.9 VITAMIN D DEFICIENCY: ICD-10-CM

## 2017-08-14 DIAGNOSIS — E11.9 TYPE 2 DIABETES MELLITUS WITHOUT COMPLICATIONS (HCC): ICD-10-CM

## 2017-08-14 DIAGNOSIS — I10 ESSENTIAL (PRIMARY) HYPERTENSION: ICD-10-CM

## 2017-08-14 DIAGNOSIS — C41.9 MALIGNANT NEOPLASM OF BONE AND ARTICULAR CARTILAGE (HCC): ICD-10-CM

## 2017-08-14 DIAGNOSIS — I48.91 ATRIAL FIBRILLATION (HCC): ICD-10-CM

## 2017-08-14 LAB
25(OH)D3 SERPL-MCNC: 36 NG/ML (ref 30–100)
ALBUMIN SERPL BCP-MCNC: 2.9 G/DL (ref 3.5–5)
ALP SERPL-CCNC: 88 U/L (ref 46–116)
ALT SERPL W P-5'-P-CCNC: 29 U/L (ref 12–78)
ANION GAP SERPL CALCULATED.3IONS-SCNC: 6 MMOL/L (ref 4–13)
AST SERPL W P-5'-P-CCNC: 19 U/L (ref 5–45)
BASOPHILS # BLD AUTO: 0.04 THOUSANDS/ΜL (ref 0–0.1)
BASOPHILS NFR BLD AUTO: 1 % (ref 0–1)
BILIRUB SERPL-MCNC: 0.4 MG/DL (ref 0.2–1)
BUN SERPL-MCNC: 24 MG/DL (ref 5–25)
CALCIUM SERPL-MCNC: 9.1 MG/DL (ref 8.3–10.1)
CHLORIDE SERPL-SCNC: 106 MMOL/L (ref 100–108)
CHOLEST SERPL-MCNC: 239 MG/DL (ref 50–200)
CO2 SERPL-SCNC: 32 MMOL/L (ref 21–32)
CREAT SERPL-MCNC: 0.95 MG/DL (ref 0.6–1.3)
CREAT UR-MCNC: 185 MG/DL
EOSINOPHIL # BLD AUTO: 0.17 THOUSAND/ΜL (ref 0–0.61)
EOSINOPHIL NFR BLD AUTO: 2 % (ref 0–6)
ERYTHROCYTE [DISTWIDTH] IN BLOOD BY AUTOMATED COUNT: 17.2 % (ref 11.6–15.1)
EST. AVERAGE GLUCOSE BLD GHB EST-MCNC: 186 MG/DL
GFR SERPL CREATININE-BSD FRML MDRD: 61 ML/MIN/1.73SQ M
GLUCOSE P FAST SERPL-MCNC: 139 MG/DL (ref 65–99)
HBA1C MFR BLD: 8.1 % (ref 4.2–6.3)
HCT VFR BLD AUTO: 40.1 % (ref 34.8–46.1)
HDLC SERPL-MCNC: 59 MG/DL (ref 40–60)
HGB BLD-MCNC: 12.8 G/DL (ref 11.5–15.4)
LDLC SERPL CALC-MCNC: 153 MG/DL (ref 0–100)
LYMPHOCYTES # BLD AUTO: 1.12 THOUSANDS/ΜL (ref 0.6–4.47)
LYMPHOCYTES NFR BLD AUTO: 13 % (ref 14–44)
MCH RBC QN AUTO: 25.5 PG (ref 26.8–34.3)
MCHC RBC AUTO-ENTMCNC: 31.9 G/DL (ref 31.4–37.4)
MCV RBC AUTO: 80 FL (ref 82–98)
MICROALBUMIN UR-MCNC: 15.8 MG/L (ref 0–20)
MICROALBUMIN/CREAT 24H UR: 9 MG/G CREATININE (ref 0–30)
MONOCYTES # BLD AUTO: 0.64 THOUSAND/ΜL (ref 0.17–1.22)
MONOCYTES NFR BLD AUTO: 8 % (ref 4–12)
NEUTROPHILS # BLD AUTO: 6.56 THOUSANDS/ΜL (ref 1.85–7.62)
NEUTS SEG NFR BLD AUTO: 76 % (ref 43–75)
PLATELET # BLD AUTO: 273 THOUSANDS/UL (ref 149–390)
PMV BLD AUTO: 10.5 FL (ref 8.9–12.7)
POTASSIUM SERPL-SCNC: 4.6 MMOL/L (ref 3.5–5.3)
PROT SERPL-MCNC: 6.2 G/DL (ref 6.4–8.2)
RBC # BLD AUTO: 5.01 MILLION/UL (ref 3.81–5.12)
SODIUM SERPL-SCNC: 144 MMOL/L (ref 136–145)
TRIGL SERPL-MCNC: 134 MG/DL
TSH SERPL DL<=0.05 MIU/L-ACNC: 2.05 UIU/ML (ref 0.36–3.74)
WBC # BLD AUTO: 8.53 THOUSAND/UL (ref 4.31–10.16)

## 2017-08-14 PROCEDURE — 80053 COMPREHEN METABOLIC PANEL: CPT

## 2017-08-14 PROCEDURE — 83036 HEMOGLOBIN GLYCOSYLATED A1C: CPT

## 2017-08-14 PROCEDURE — 80061 LIPID PANEL: CPT

## 2017-08-14 PROCEDURE — 82570 ASSAY OF URINE CREATININE: CPT

## 2017-08-14 PROCEDURE — 84443 ASSAY THYROID STIM HORMONE: CPT

## 2017-08-14 PROCEDURE — 85025 COMPLETE CBC W/AUTO DIFF WBC: CPT

## 2017-08-14 PROCEDURE — 36415 COLL VENOUS BLD VENIPUNCTURE: CPT

## 2017-08-14 PROCEDURE — 82306 VITAMIN D 25 HYDROXY: CPT

## 2017-08-14 PROCEDURE — 82043 UR ALBUMIN QUANTITATIVE: CPT

## 2017-08-16 ENCOUNTER — ALLSCRIPTS OFFICE VISIT (OUTPATIENT)
Dept: OTHER | Facility: OTHER | Age: 67
End: 2017-08-16

## 2017-08-17 ENCOUNTER — ALLSCRIPTS OFFICE VISIT (OUTPATIENT)
Dept: OTHER | Facility: OTHER | Age: 67
End: 2017-08-17

## 2017-08-29 ENCOUNTER — GENERIC CONVERSION - ENCOUNTER (OUTPATIENT)
Dept: OTHER | Facility: OTHER | Age: 67
End: 2017-08-29

## 2017-08-30 ENCOUNTER — ALLSCRIPTS OFFICE VISIT (OUTPATIENT)
Dept: OTHER | Facility: OTHER | Age: 67
End: 2017-08-30

## 2017-08-30 ENCOUNTER — TRANSCRIBE ORDERS (OUTPATIENT)
Dept: ADMINISTRATIVE | Facility: HOSPITAL | Age: 67
End: 2017-08-30

## 2017-08-30 DIAGNOSIS — J68.0 BRONCHITIS AND PNEUMONITIS DUE TO FUMES AND VAPORS (HCC): Primary | ICD-10-CM

## 2017-09-12 ENCOUNTER — TELEPHONE (OUTPATIENT)
Dept: INPATIENT UNIT | Facility: HOSPITAL | Age: 67
End: 2017-09-12

## 2017-09-13 ENCOUNTER — HOSPITAL ENCOUNTER (OUTPATIENT)
Dept: RADIOLOGY | Facility: HOSPITAL | Age: 67
Discharge: HOME | End: 2017-09-13
Attending: INTERNAL MEDICINE | Admitting: INTERNAL MEDICINE
Payer: MEDICARE

## 2017-09-13 VITALS
DIASTOLIC BLOOD PRESSURE: 61 MMHG | SYSTOLIC BLOOD PRESSURE: 122 MMHG | RESPIRATION RATE: 16 BRPM | TEMPERATURE: 98.1 F | HEIGHT: 65 IN | WEIGHT: 209 LBS | BODY MASS INDEX: 34.82 KG/M2 | HEART RATE: 67 BPM | OXYGEN SATURATION: 94 %

## 2017-09-13 DIAGNOSIS — C34.91 PRIMARY LUNG CANCER WITH METASTASIS FROM LUNG TO OTHER SITE, RIGHT (HCC): ICD-10-CM

## 2017-09-13 PROCEDURE — 20225 BONE BIOPSY TROCAR/NDL DEEP: CPT

## 2017-09-13 PROCEDURE — 88341 IMHCHEM/IMCYTCHM EA ADD ANTB: CPT | Performed by: INTERNAL MEDICINE

## 2017-09-13 PROCEDURE — 99152 MOD SED SAME PHYS/QHP 5/>YRS: CPT

## 2017-09-13 PROCEDURE — 88333 PATH CONSLTJ SURG CYTO XM 1: CPT | Performed by: INTERNAL MEDICINE

## 2017-09-13 PROCEDURE — 88342 IMHCHEM/IMCYTCHM 1ST ANTB: CPT | Performed by: INTERNAL MEDICINE

## 2017-09-13 PROCEDURE — 88305 TISSUE EXAM BY PATHOLOGIST: CPT | Performed by: INTERNAL MEDICINE

## 2017-09-13 RX ORDER — HYDROCODONE BITARTRATE AND ACETAMINOPHEN 5; 325 MG/1; MG/1
1 TABLET ORAL EVERY 6 HOURS PRN
Status: DISCONTINUED | OUTPATIENT
Start: 2017-09-13 | End: 2017-09-13 | Stop reason: HOSPADM

## 2017-09-13 RX ORDER — FENTANYL CITRATE 50 UG/ML
INJECTION, SOLUTION INTRAMUSCULAR; INTRAVENOUS CODE/TRAUMA/SEDATION MEDICATION
Status: COMPLETED | OUTPATIENT
Start: 2017-09-13 | End: 2017-09-13

## 2017-09-13 RX ORDER — SODIUM CHLORIDE 9 MG/ML
75 INJECTION, SOLUTION INTRAVENOUS CONTINUOUS
Status: DISCONTINUED | OUTPATIENT
Start: 2017-09-13 | End: 2017-09-13 | Stop reason: HOSPADM

## 2017-09-13 RX ORDER — MIDAZOLAM HYDROCHLORIDE 1 MG/ML
INJECTION INTRAMUSCULAR; INTRAVENOUS CODE/TRAUMA/SEDATION MEDICATION
Status: COMPLETED | OUTPATIENT
Start: 2017-09-13 | End: 2017-09-13

## 2017-09-13 RX ADMIN — MIDAZOLAM 1 MG: 1 INJECTION INTRAMUSCULAR; INTRAVENOUS at 08:19

## 2017-09-13 RX ADMIN — MIDAZOLAM 2 MG: 1 INJECTION INTRAMUSCULAR; INTRAVENOUS at 08:16

## 2017-09-13 RX ADMIN — FENTANYL CITRATE 50 MCG: 50 INJECTION, SOLUTION INTRAMUSCULAR; INTRAVENOUS at 08:16

## 2017-09-13 RX ADMIN — SODIUM CHLORIDE 75 ML/HR: 0.9 INJECTION, SOLUTION INTRAVENOUS at 07:29

## 2017-09-13 RX ADMIN — FENTANYL CITRATE 50 MCG: 50 INJECTION, SOLUTION INTRAMUSCULAR; INTRAVENOUS at 08:19

## 2017-09-15 ENCOUNTER — HOSPITAL ENCOUNTER (OUTPATIENT)
Dept: PULMONOLOGY | Facility: HOSPITAL | Age: 67
Discharge: HOME | End: 2017-09-15
Attending: INTERNAL MEDICINE
Payer: MEDICARE

## 2017-09-15 ENCOUNTER — GENERIC CONVERSION - ENCOUNTER (OUTPATIENT)
Dept: OTHER | Facility: OTHER | Age: 67
End: 2017-09-15

## 2017-09-15 DIAGNOSIS — J68.0 BRONCHITIS AND PNEUMONITIS DUE TO FUMES AND VAPORS (HCC): ICD-10-CM

## 2017-09-15 PROCEDURE — 94760 N-INVAS EAR/PLS OXIMETRY 1: CPT

## 2017-09-15 PROCEDURE — 94010 BREATHING CAPACITY TEST: CPT

## 2017-09-15 PROCEDURE — 94729 DIFFUSING CAPACITY: CPT

## 2017-09-20 ENCOUNTER — ALLSCRIPTS OFFICE VISIT (OUTPATIENT)
Dept: OTHER | Facility: OTHER | Age: 67
End: 2017-09-20

## 2017-09-20 DIAGNOSIS — C41.9 MALIGNANT NEOPLASM OF BONE AND ARTICULAR CARTILAGE (HCC): ICD-10-CM

## 2017-09-21 ENCOUNTER — GENERIC CONVERSION - ENCOUNTER (OUTPATIENT)
Dept: OTHER | Facility: OTHER | Age: 67
End: 2017-09-21

## 2017-09-21 ENCOUNTER — APPOINTMENT (OUTPATIENT)
Dept: RADIATION ONCOLOGY | Facility: HOSPITAL | Age: 67
End: 2017-09-21
Attending: RADIOLOGY
Payer: MEDICARE

## 2017-09-21 PROCEDURE — 99214 OFFICE O/P EST MOD 30 MIN: CPT | Performed by: RADIOLOGY

## 2017-09-25 ENCOUNTER — APPOINTMENT (OUTPATIENT)
Dept: RADIATION ONCOLOGY | Facility: CLINIC | Age: 67
End: 2017-09-25
Attending: RADIOLOGY
Payer: MEDICARE

## 2017-09-25 ENCOUNTER — GENERIC CONVERSION - ENCOUNTER (OUTPATIENT)
Dept: OTHER | Facility: OTHER | Age: 67
End: 2017-09-25

## 2017-09-25 PROCEDURE — 77334 RADIATION TREATMENT AID(S): CPT | Performed by: RADIOLOGY

## 2017-09-25 PROCEDURE — 77290 THER RAD SIMULAJ FIELD CPLX: CPT | Performed by: RADIOLOGY

## 2017-09-29 ENCOUNTER — APPOINTMENT (OUTPATIENT)
Dept: RADIATION ONCOLOGY | Facility: HOSPITAL | Age: 67
End: 2017-09-29
Payer: MEDICARE

## 2017-09-29 LAB — SCAN RESULT: NORMAL

## 2017-09-29 PROCEDURE — 77334 RADIATION TREATMENT AID(S): CPT | Performed by: RADIOLOGY

## 2017-09-29 PROCEDURE — 77307 TELETHX ISODOSE PLAN CPLX: CPT | Performed by: RADIOLOGY

## 2017-10-02 ENCOUNTER — APPOINTMENT (OUTPATIENT)
Dept: RADIATION ONCOLOGY | Facility: HOSPITAL | Age: 67
End: 2017-10-02
Attending: RADIOLOGY
Payer: MEDICARE

## 2017-10-02 PROCEDURE — 77280 THER RAD SIMULAJ FIELD SMPL: CPT | Performed by: RADIOLOGY

## 2017-10-03 ENCOUNTER — ALLSCRIPTS OFFICE VISIT (OUTPATIENT)
Dept: OTHER | Facility: OTHER | Age: 67
End: 2017-10-03

## 2017-10-03 PROCEDURE — 77412 RADIATION TX DELIVERY LVL 3: CPT | Performed by: RADIOLOGY

## 2017-10-03 PROCEDURE — 77387 GUIDANCE FOR RADJ TX DLVR: CPT | Performed by: RADIOLOGY

## 2017-10-04 PROCEDURE — 77331 SPECIAL RADIATION DOSIMETRY: CPT | Performed by: RADIOLOGY

## 2017-10-04 PROCEDURE — 77412 RADIATION TX DELIVERY LVL 3: CPT | Performed by: RADIOLOGY

## 2017-10-04 PROCEDURE — 77387 GUIDANCE FOR RADJ TX DLVR: CPT | Performed by: RADIOLOGY

## 2017-10-04 PROCEDURE — 77336 RADIATION PHYSICS CONSULT: CPT | Performed by: RADIOLOGY

## 2017-10-04 NOTE — PROGRESS NOTES
Assessment  Assessed    1  Atrial fibrillation (427 31) (I48 91)   2  Benign essential hypertension (401 1) (I10)    Plan  Atrial fibrillation    · EKG/ECG- POC; Status:Complete;   Done: 16JEQ2502 08:30AM   Perform: In Office; Last Updated Rissagurpreet Gamboa; 10/3/2017 8:40:02 AM;Ordered; For:Atrial fibrillation; Ordered By:Guerrero Galo; Atrial fibrillation, Benign essential hypertension    · Follow-up visit in 6 months Evaluation and Treatment  Follow-up  Status: Hold For -  Scheduling  Requested for: 53LQB6015   Ordered; For: Atrial fibrillation, Benign essential hypertension; Ordered By: Francisco Ya Performed:  Due: 32SEJ6060    Discussion/Summary  Cardiology Discussion Summary Free Text Note Form [facility]:   69year old with hypertension, diabetes, stage IV lung ca, paroxysmal atrial fibrillation, who returns for follow up  Doing well from a cardiac standpoint  No further palpitations  Paroxysmal atrial fibrillation - in normal sinus rhythm  On antiarrhythmic medications  On anticoagulation  Hypertension - borderline control  Continue current medications  If blood pressure still elevated in a few months, would consider starting ACE-I  Follow up in 6 months  Chief Complaint  Chief Complaint Free Text Note Form: 6 month cardiac follow up with EKG  Chief Complaint Chronic Condition [facility]: Patient is here today for follow up of chronic conditions described in HPI  History of Present Illness  Cardiology HPI Free Text Note Form [facility]: Ms Isiah Rogers is a 69year old with hypertension, diabetes, stage IV lung ca, paroxysmal atrial fibrillation, who returns for follow up  Doing well from a cardiac standpoint  No further palpitations  Review of Systems  Cardiology Female ROS:     Cardiac: No complaints of chest pain, no palpitations, no fainting  Skin: No complaints of nonhealing sores or skin rash     Genitourinary: No complaints of recurrent urinary tract infections, frequent urination at night, difficult urination, blood in urine, kidney stones, loss of bladder control, kidney problems, denies any birth control or hormone replacement, is not post menopausal, not currently pregnant  Psychological: No complaints of feeling depressed, anxiety, panic attacks, or difficulty concentrating  General: No complaints of trouble sleeping, lack of energy, fatigue, appetite changes, weight changes, fever, frequent infections, or night sweats  Respiratory: No complaints of shortness of breath, cough with sputum, or wheezing  HEENT: No complaints of serious problems, hearing problems, nose problems, throat problems, or snoring  Gastrointestinal: No complaints of liver problems, nausea, vomiting, heartburn, constipation, bloody stools, diarrhea, problems swallowing, adbominal pain, or rectal bleeding  Hematologic: No complaints of bleeding disorders, anemia, blood clots, or excessive brusing  Neurological: No complaints of numbness, tingling, dizziness, weakness, seizures, headaches, syncope or fainting, AM fatigue, daytime sleepiness, no witnessed apnea episodes  Musculoskeletal: No complaints of arthritis, back pain, or painfull swelling  Active Problems  Problems    1  Abnormal chest CT (793 2) (R93 8)   2  Acid reflux (530 81) (K21 9)   3  Adenocarcinoma of right lung, stage 4 (162 9) (C34 91)   4  Anxiety (300 00) (F41 9)   5  Atrial fibrillation (427 31) (I48 91)   6  Benign essential hypertension (401 1) (I10)   7  Cancer related pain (338 3) (G89 3)   8  Chemical pneumonitis (506 0,E980 9) (J68 0)   9  Chronic diastolic congestive heart failure (428 32,428 0) (I50 32)   10  Chronic left shoulder pain (719 41,338 29) (M25 512,G89 29)   11  Colon cancer screening (V76 51) (Z12 11)   12  Diabetes mellitus type 2, uncomplicated (300 88) (J97 8)   13  Encounter for eye exam (V72 0) (Z01 00)   14  Encounter for screening mammogram for breast cancer (V76 12) (Z12 31)   15   Hospital discharge follow-up (V67 59) (Z09)   16  Hyperlipidemia (272 4) (E78 5)   17  Hypoxemia (799 02) (R09 02)   18  Insomnia (780 52) (G47 00)   19  Mammogram abnormal (793 80) (R92 8)   20  Metastatic bone cancer (170 9) (C41 9)   21  Mild depression (311) (F32 0)   22  Need for prophylactic vaccination against Streptococcus pneumoniae (pneumococcus)    (V03 82) (Z23)   23  Need for prophylactic vaccination and inoculation against influenza (V04 81) (Z23)   24  Osteoporosis screening (V82 81) (Z13 820)   25  Pain in joint of left shoulder (719 41) (M25 512)   26  Screening for colon cancer (V76 51) (Z12 11)   27  Screening for glaucoma (V80 1) (Z13 5)   28  Screening for other and unspecified genitourinary condition (V81 6) (Z13 89)   29  Special screening for other neurological conditions (V80 09) (Z13 89)   30  Vitamin D deficiency (268 9) (E55 9)   31  Well female exam with routine gynecological exam (V72 31) (Z01 419)    Past Medical History  Problems    1  History of Community acquired pneumonia (5) (J18 9)   2  History of arthritis (V13 4) (Z87 39)   3  History of cancer of uterus (V10 42) (Z85 42)   4  History of hypertension (V12 59) (Z86 79)   5  History of radiation therapy (V15 3) (Z92 3)   6  History of Malignant neoplasm without specification of site (199 1) (C80 1)   7  History of Racing heart beat (785 0) (R00 0)   8  History of Skin cancer, basal cell (173 91) (C44 91)   9  History of Upper respiratory infection with cough and congestion (465 9) (J06 9)  Active Problems And Past Medical History Reviewed: The active problems and past medical history were reviewed and updated today  Surgical History  Problems    1  History of Appendectomy   2  History of Bronchoscopy (Therap) W/ EBUS Guid Transendoscopic, For Periph Lesions   3  History of Flexible Fiberoptic Laryngoscopy (Therapeutic)   4  History of Gallbladder Surgery   5  History of Knee Replacement   6  History of Mohs Micrographic Surgery Face   7  History of Tonsillectomy With Adenoidectomy   8  History of Total Abdominal Hysterectomy  Surgical History Reviewed: The surgical history was reviewed and updated today  Family History  Mother    1  Family history of    2  Family history of cerebrovascular accident (V17 1) (Z82 3)   3  Family history of dementia (V17 2) (Z81 8)   4  Family history of diabetes mellitus (V18 0) (Z83 3)   5  Family history of hypertension (V17 49) (Z82 49)   6  Family history of thyroid disease (V18 19) (Z83 49)  Father    9  Family history of arthritis (V17 7) (Z82 61)   8  Family history of cardiac disorder (V17 49) (Z82 49)   9  Family history of cerebrovascular accident (V17 1) (Z82 3)   10  Family history of hypertension (V17 49) (Z82 49)  Maternal Grandfather    11  Family history of Cancer of unknown origin  Paternal Uncle    15  Family history of lung cancer (V16 1) (Z80 1)  Family History    13  Family history of Cancer   14  Family history of Depression   15  Family history of Diabetes Mellitus (V18 0)   16  Family history of Essential Hypercholesterolemia   17  Family history of Heart Disease (V17 49)   18  Family history of Hypertension (V17 49)   19  Family history of Stroke Syndrome (V17 1)  Family History Reviewed: The family history was reviewed and updated today  Social History  Problems    · Daily caffeinated coffee consumption   · Former smoker (O18 91) (W26 804)   · Lives with family   · Never Drank Alcohol   · No drug use   · Retired   ·   Social History Reviewed: The social history was reviewed and updated today  The social history was reviewed and is unchanged  Current Meds   1  Calcium + D3 TABS; Take 1 tablet twice daily; Therapy: (Recorded:72Jkg9610) to Recorded   2  Eliquis 5 MG Oral Tablet; take one tablet by mouth twice daily; Therapy: 27XXJ0970 to )  Requested for: 27HDS2441; Last   Rx:61Flg2251 Ordered   3   MetFORMIN HCl - 1000 MG Oral Tablet; TAKE 1 TABLET TWICE DAILY; Therapy: 02CPY1732 to (486 5504)  Requested for: 61BKW7593; Last   Rx:12Jan2017 Ordered   4  Metoprolol Tartrate 25 MG Oral Tablet; TAKE 1 TABLET TWICE DAILY  Requested for:   97KWD4395; Last XM:12JDU0773 Ordered   5  Omeprazole 20 MG Oral Tablet Delayed Release; Take 1 tablet daily  Requested for:   12Jan2017; Last Rx:12Jan2017 Ordered   6  Sotalol HCl - 80 MG Oral Tablet; TAKE 1 TABLET EVERY 12 HOURS DAILY  Requested   for: 22SYR9016; Last Rx:13Jan2017 Ordered   7  Tradjenta 5 MG Oral Tablet; Take 1 tablet daily; Therapy: 53INB2033 to (Evaluate:07Jan2018)  Requested for: 02BZD1947; Last   Rx:12Jan2017 Ordered   8  Tylenol Extra Strength 500 MG Oral Tablet; take 2 tablet twice daily; Therapy: (Arvil Ruts) to Recorded   9  Venlafaxine HCl ER 37 5 MG Oral Capsule Extended Release 24 Hour; Take 1 capsule   by mouth at  bedtime; Therapy: 13KYV4719 to ([phone])  Requested for: 72FFE6164; Last   Rx:85Nmj7241 Ordered   10  Ventolin  (90 Base) MCG/ACT Inhalation Aerosol Solution; INHALE 2 PUFFS    EVERY 4-6 HOURS AS NEEDED; Therapy: (Arvil Ruts) to Recorded   11  Vitamin D 2000 UNIT Oral Tablet; TAKE 1 TAB BID AS DIRECTED; Therapy: 73EOK8115 to Recorded  Medication List Reviewed: The medication list was reviewed and updated today  Allergies  Medication    1  Amoxil TABS   2  Crestor TABS   3  Zetia TABS   4  Pravachol TABS   5  Simvastatin TABS  Non-Medication    6  Mold    Vitals  Vital Signs    Recorded: 49VKQ5059 08:31AM   Heart Rate 71, Apical   Systolic 699, LUE, Sitting   Diastolic 60, LUE, Sitting   Height 5 ft 5 in   Weight 210 lb    BMI Calculated 34 95   BSA Calculated 2 02   O2 Saturation 97, RA     Physical Exam    Constitutional   General appearance: No acute distress, well appearing and well nourished  Eyes   Conjunctiva and Sclera examination: Conjunctiva pink, sclera anicteric      Ears, Nose, Mouth, and Throat - External inspection of ears and nose: Normal without deformities or discharge  Neck   Neck and thyroid: Normal, supple, trachea midline, no thyromegaly  Pulmonary   Respiratory effort: No increased work of breathing or signs of respiratory distress  Auscultation of lungs: Clear to auscultation, no rales, no rhonchi, no wheezing, good air movement  Cardiovascular   Auscultation of heart: Normal rate and rhythm, normal S1 and S2, no murmurs  Examination of extremities for edema and/or varicosities: Normal     Chest - Chest: Normal    Abdomen   Abdomen: Non-tender and no distention  Musculoskeletal Gait and station: Normal gait  Skin - Skin and subcutaneous tissue: Normal without rashes or lesions  Skin is warm and well perfused, normal turgor  Neurologic - Speech: Normal     Psychiatric - Orientation to person, place, and time: Normal -Mood and affect: Normal       Results/Data  ECG Report: A 12 lead ECG was performed and was normal    Rhythm and rate:  ventricular rate is 71 beats per minute -normal sinus rhythm  Health Management  Well female exam with routine gynecological exam   BREAST EXAM; every 1 year; Next Due: 23Usj9583; Overdue    Future Appointments    Date/Time Provider Specialty Site   12/20/2017 08:15 AM PHYLLIS Bowers  Hematology Oncology [facility]   12/14/2017 10:40 AM PHYLLIS White   Pulmonary Medicine [address] [facility] [place]   12/27/2017 09:45 AM Shraddha Zimmerman DO Family Medicine [address] [facility]   11/08/2017 10:00 AM [facility]     Signatures   Electronically signed by : PHYLLIS Zayas ; Oct  3 2017  8:52AM EST                       (Author)

## 2017-10-05 PROCEDURE — 77387 GUIDANCE FOR RADJ TX DLVR: CPT | Performed by: RADIOLOGY

## 2017-10-05 PROCEDURE — 77412 RADIATION TX DELIVERY LVL 3: CPT | Performed by: RADIOLOGY

## 2017-10-06 PROCEDURE — 77387 GUIDANCE FOR RADJ TX DLVR: CPT | Performed by: RADIOLOGY

## 2017-10-06 PROCEDURE — 77412 RADIATION TX DELIVERY LVL 3: CPT | Performed by: RADIOLOGY

## 2017-10-09 PROCEDURE — 77417 THER RADIOLOGY PORT IMAGE(S): CPT | Performed by: RADIOLOGY

## 2017-10-09 PROCEDURE — 77387 GUIDANCE FOR RADJ TX DLVR: CPT | Performed by: RADIOLOGY

## 2017-10-09 PROCEDURE — 77412 RADIATION TX DELIVERY LVL 3: CPT | Performed by: RADIOLOGY

## 2017-10-10 ENCOUNTER — TRANSCRIBE ORDERS (OUTPATIENT)
Dept: LAB | Facility: CLINIC | Age: 67
End: 2017-10-10

## 2017-10-10 ENCOUNTER — APPOINTMENT (OUTPATIENT)
Dept: LAB | Facility: CLINIC | Age: 67
End: 2017-10-10
Payer: MEDICARE

## 2017-10-10 DIAGNOSIS — C34.91 PRIMARY LUNG CANCER WITH METASTASIS FROM LUNG TO OTHER SITE, RIGHT (HCC): ICD-10-CM

## 2017-10-10 DIAGNOSIS — C34.91 PRIMARY LUNG CANCER WITH METASTASIS FROM LUNG TO OTHER SITE, RIGHT (HCC): Primary | ICD-10-CM

## 2017-10-10 LAB
BASOPHILS # BLD AUTO: 0.03 THOUSANDS/ΜL (ref 0–0.1)
BASOPHILS NFR BLD AUTO: 0 % (ref 0–1)
EOSINOPHIL # BLD AUTO: 0.2 THOUSAND/ΜL (ref 0–0.61)
EOSINOPHIL NFR BLD AUTO: 3 % (ref 0–6)
ERYTHROCYTE [DISTWIDTH] IN BLOOD BY AUTOMATED COUNT: 13.9 % (ref 11.6–15.1)
HCT VFR BLD AUTO: 37.5 % (ref 34.8–46.1)
HGB BLD-MCNC: 11.9 G/DL (ref 11.5–15.4)
LYMPHOCYTES # BLD AUTO: 0.72 THOUSANDS/ΜL (ref 0.6–4.47)
LYMPHOCYTES NFR BLD AUTO: 9 % (ref 14–44)
MCH RBC QN AUTO: 25.4 PG (ref 26.8–34.3)
MCHC RBC AUTO-ENTMCNC: 31.7 G/DL (ref 31.4–37.4)
MCV RBC AUTO: 80 FL (ref 82–98)
MONOCYTES # BLD AUTO: 0.56 THOUSAND/ΜL (ref 0.17–1.22)
MONOCYTES NFR BLD AUTO: 7 % (ref 4–12)
NEUTROPHILS # BLD AUTO: 6.53 THOUSANDS/ΜL (ref 1.85–7.62)
NEUTS SEG NFR BLD AUTO: 81 % (ref 43–75)
PLATELET # BLD AUTO: 315 THOUSANDS/UL (ref 149–390)
PMV BLD AUTO: 10.1 FL (ref 8.9–12.7)
RBC # BLD AUTO: 4.68 MILLION/UL (ref 3.81–5.12)
WBC # BLD AUTO: 8.04 THOUSAND/UL (ref 4.31–10.16)

## 2017-10-10 PROCEDURE — 36415 COLL VENOUS BLD VENIPUNCTURE: CPT

## 2017-10-10 PROCEDURE — 77387 GUIDANCE FOR RADJ TX DLVR: CPT | Performed by: RADIOLOGY

## 2017-10-10 PROCEDURE — 85025 COMPLETE CBC W/AUTO DIFF WBC: CPT

## 2017-10-10 PROCEDURE — 77412 RADIATION TX DELIVERY LVL 3: CPT | Performed by: RADIOLOGY

## 2017-10-11 PROCEDURE — 77336 RADIATION PHYSICS CONSULT: CPT | Performed by: RADIOLOGY

## 2017-10-11 PROCEDURE — 77412 RADIATION TX DELIVERY LVL 3: CPT | Performed by: RADIOLOGY

## 2017-10-11 PROCEDURE — 77387 GUIDANCE FOR RADJ TX DLVR: CPT | Performed by: RADIOLOGY

## 2017-10-12 PROCEDURE — 77412 RADIATION TX DELIVERY LVL 3: CPT | Performed by: RADIOLOGY

## 2017-10-12 PROCEDURE — 77387 GUIDANCE FOR RADJ TX DLVR: CPT | Performed by: RADIOLOGY

## 2017-10-13 PROCEDURE — 77387 GUIDANCE FOR RADJ TX DLVR: CPT | Performed by: RADIOLOGY

## 2017-10-13 PROCEDURE — 77412 RADIATION TX DELIVERY LVL 3: CPT | Performed by: RADIOLOGY

## 2017-10-16 ENCOUNTER — APPOINTMENT (OUTPATIENT)
Dept: LAB | Facility: CLINIC | Age: 67
End: 2017-10-16
Payer: MEDICARE

## 2017-10-16 DIAGNOSIS — C34.91 PRIMARY LUNG CANCER WITH METASTASIS FROM LUNG TO OTHER SITE, RIGHT (HCC): ICD-10-CM

## 2017-10-16 LAB
BASOPHILS # BLD AUTO: 0.01 THOUSANDS/ΜL (ref 0–0.1)
BASOPHILS NFR BLD AUTO: 0 % (ref 0–1)
EOSINOPHIL # BLD AUTO: 0.24 THOUSAND/ΜL (ref 0–0.61)
EOSINOPHIL NFR BLD AUTO: 4 % (ref 0–6)
ERYTHROCYTE [DISTWIDTH] IN BLOOD BY AUTOMATED COUNT: 14.1 % (ref 11.6–15.1)
HCT VFR BLD AUTO: 37.8 % (ref 34.8–46.1)
HGB BLD-MCNC: 11.9 G/DL (ref 11.5–15.4)
LYMPHOCYTES # BLD AUTO: 0.95 THOUSANDS/ΜL (ref 0.6–4.47)
LYMPHOCYTES NFR BLD AUTO: 14 % (ref 14–44)
MCH RBC QN AUTO: 25.3 PG (ref 26.8–34.3)
MCHC RBC AUTO-ENTMCNC: 31.5 G/DL (ref 31.4–37.4)
MCV RBC AUTO: 80 FL (ref 82–98)
MONOCYTES # BLD AUTO: 0.59 THOUSAND/ΜL (ref 0.17–1.22)
MONOCYTES NFR BLD AUTO: 9 % (ref 4–12)
NEUTROPHILS # BLD AUTO: 5.15 THOUSANDS/ΜL (ref 1.85–7.62)
NEUTS SEG NFR BLD AUTO: 73 % (ref 43–75)
PLATELET # BLD AUTO: 336 THOUSANDS/UL (ref 149–390)
PMV BLD AUTO: 10.2 FL (ref 8.9–12.7)
RBC # BLD AUTO: 4.7 MILLION/UL (ref 3.81–5.12)
WBC # BLD AUTO: 6.94 THOUSAND/UL (ref 4.31–10.16)

## 2017-10-16 PROCEDURE — 77412 RADIATION TX DELIVERY LVL 3: CPT | Performed by: RADIOLOGY

## 2017-10-16 PROCEDURE — 36415 COLL VENOUS BLD VENIPUNCTURE: CPT

## 2017-10-16 PROCEDURE — 85025 COMPLETE CBC W/AUTO DIFF WBC: CPT

## 2017-10-16 PROCEDURE — 77387 GUIDANCE FOR RADJ TX DLVR: CPT | Performed by: RADIOLOGY

## 2017-10-25 NOTE — PROGRESS NOTES
Assessment  1  Abnormal chest CT (793 2) (R93 8)   2  Chemical pneumonitis (506 0,E980 9) (J68 0)   3  Adenocarcinoma of right lung, stage 4 (162 9) (C34 91)   4  Hypoxemia (799 02) (R09 02)    Plan  Abnormal chest CT    · Sulfamethoxazole-Trimethoprim 800-160 MG Oral Tablet (Bactrim DS)   Rx By: Rika Dickey; Dispense: 0 Days ; #:30 Tablet; Refill: 3;For: Abnormal chest CT; TUCKER = N; Sent To: [facility]; Last Updated By: Anamaria Castellanos; 8/30/2017 1:42:14 PM  Chemical pneumonitis    · PredniSONE 20 MG Oral Tablet   Rx By: Justyna Flores; Dispense: 0 Days ; #: Sufficient Tablet; Refill: 2;For: Chemical pneumonitis; TUCKER = N; Record; Last Updated By: Anamaria Castellanos; 8/30/2017 1:42:14 PM   · Follow-up visit in 4 Months Evaluation and Treatment  Follow-up  Status: Hold For -  Scheduling  Requested for: 95Mqa3699   Ordered; For: Chemical pneumonitis; Ordered By: Anamaria Castellanos Performed:  Due: 03QJZ8543   · Spirometry w Diffusion Capacity Assessment; Status:Active; Requested for:97Gon9087  07:15AM;    Perform:[facility]; Order Comments:only DLCO needed; Due:50Hnt0493; Last Updated By:Angeles Ye [facility]; 8/30/2017 1:50:19 PM;Ordered; For:Chemical pneumonitis; Ordered By:Marquis Jules Manning; Hypoxemia    · Discontinue Home Oxygen; Status:Complete;   Done: 77FCA6290 03:51PM   Perform:Not Applicable; HNO:56TML5195; Ordered;For:Hypoxemia; Ordered By:Denzel Manning;   · Six Minute Walk Test - POC; Status:Complete;   Done: 84MXT2314   Perform: In Office; Due:00Luk3809;Ordered;For:Hypoxemia; Ordered By:Marquis Jules Manning; Results/Data  Results Free Text Form St Clear:   Results   PET Scan PET scan reviewed by me on PACs: IMPRESSION:Interval improvement in much of the hypermetabolic lung infiltrates seen on the prior PET CT  However there are new hypermetabolic infiltrates predominantly in the bilateral upper lungs  These may also be inflammatory/infectious, with coexistent also not excluded   Continued follow-up recommended  Stable shotty mediastinal nodes with only minimal FDG activity, favoring a reactive etiology  Continued assessment on follow-up recommended  No new hypermetabolic hilar adenopathy  Enlarging left scapular metastatic lesion  Otherwise no new hypermetabolic metastases visualized  Discussion/Summary  Discussion Summary:   - Stage IV adenocarcinoma of the loan, she'll continue to follow up with oncology for planned chemotherapy in the future  Also patient will be scheduled by oncology for bone biopsy from the new questionable bone metastasis in her scapula and then probably radiation therapy  abnormal CT scan with drug-induced pneumonitis, much improved with corticosteroids and also clinically, will continue the prednisone tapering to stop hopefully in one week, I told patient to stop her Bactrim prophylaxis as well  She is scheduled for CT scan in early November for follow-up  I will send patient also for diffusion capacity check  Hypoxia with exertion, improved, no need for oxygen based on our 6 minute walk test today  Counseling Documentation With Imm: The patient was counseled regarding diagnostic results,-- instructions for management,-- prognosis  Patient's Capacity to Self-Care: Patient is able to Self-Care  Medication SE Review and Pt Understands Tx: Possible side effects of new medications were reviewed with the patient/guardian today  The treatment plan was reviewed with the patient/guardian  The patient/guardian understands and agrees with the treatment plan      Active Problems  1  Abnormal chest CT (793 2) (R93 8)   2  Acid reflux (530 81) (K21 9)   3  Adenocarcinoma of right lung, stage 4 (162 9) (C34 91)   4  Anxiety (300 00) (F41 9)   5  Atrial fibrillation (427 31) (I48 91)   6  Benign essential hypertension (401 1) (I10)   7  Cancer related pain (338 3) (G89 3)   8  Chemical pneumonitis (506 0,E980 9) (J68 0)   9   Chronic diastolic congestive heart failure (428 32,428 0) (I50 32) 10  Chronic left shoulder pain (719 41,338 29) (M25 512,G89 29)   11  Colon cancer screening (V76 51) (Z12 11)   12  Diabetes mellitus type 2, uncomplicated (106 08) (D60 5)   13  Encounter for eye exam (V72 0) (Z01 00)   14  Encounter for screening mammogram for breast cancer (V76 12) (Z12 31)   15  Hospital discharge follow-up (V67 59) (Z09)   16  Hyperlipidemia (272 4) (E78 5)   17  Insomnia (780 52) (G47 00)   18  Mammogram abnormal (793 80) (R92 8)   19  Metastatic bone cancer (170 9) (C41 9)   20  Mild depression (311) (F32 0)   21  Need for prophylactic vaccination against Streptococcus pneumoniae (pneumococcus)    (V03 82) (Z23)   22  Need for prophylactic vaccination and inoculation against influenza (V04 81) (Z23)   23  Osteoporosis screening (V82 81) (Z13 820)   24  Pain in joint of left shoulder (719 41) (M25 512)   25  Screening for colon cancer (V76 51) (Z12 11)   26  Screening for glaucoma (V80 1) (Z13 5)   27  Screening for other and unspecified genitourinary condition (V81 6) (Z13 89)   28  Special screening for other neurological conditions (V80 09) (Z13 89)   29  Vitamin D deficiency (268 9) (E55 9)   30  Well female exam with routine gynecological exam (V72 31) (Z01 419)    Chief Complaint  Chief Complaint Chronic Condition  [facility] is here today for follow up of chronic conditions described in HPI  History of Present Illness  HPI: 75-year-old pleasant lady with past medical history of stage IV adenocarcinoma of the lung With bone metastases to thoracic spine, who underwent immunotherapy with pembrolizumab And also radiation therapy to her spine mets, but was complicated with changes on our CT scan and worsening shortness of breath and cough and also hypoxia   We did bronchoscopy in her biopsies were consistent with pneumonitis and no infection, her immunotherapy was stopped and she was started on high-dose steroids at 40 mg for a month taper down to 30 mg for one month than 20 mg for one month and currently she is taper down to 5 mg  She continues to take Bactrim prophylaxis for PCP  She presents today for evaluation  She states that she feels much better, denies any shortness of breath or dyspnea on exertion, she denies any cough or chest pain, she denies any fever or chills or weight loss or hemoptysis  was seen by her oncologist recently and the plan will be most likely conventional chemotherapy in the future  has a new format on her scapula but she is pending plans for biopsy then probably radiation therapy  used to be on oxygen up to one month ago when she felt fine and she stopped her oxygen on her own  Review of Systems  Complete-Female - Pulm:   Constitutional: No fever, no chills, feels well, no tiredness, no recent weight gain or weight loss  Eyes: no complaints of vision problems  ENT: no rhinitis, no PND, no epistaxis  Cardiovascular: no palpitations, no chest pain  Respiratory: as noted in HPI  Gastrointestinal: no complaints of esophageal reflux, no abdominal pain  Genitourinary: no dysuria  Musculoskeletal: no arthralgias, no joint swelling, no myalgias  Integumentary: no rash, no lesions  Neurological: no headache, no fainting, no weakness  Psychiatric: no anxiety, no depression  Hematologic/Lymphatic: ? no complaints of swollen glands  ROS Reviewed:   ROS reviewed  Past Medical History  1  History of Community acquired pneumonia (5) (J18 9)   2  History of arthritis (V13 4) (Z87 39)   3  History of cancer of uterus (V10 42) (Z85 42)   4  History of hypertension (V12 59) (Z86 79)   5  History of Malignant neoplasm without specification of site (199 1) (C80 1)   6  History of Racing heart beat (785 0) (R00 0)   7  History of Skin cancer, basal cell (173 91) (C44 91)   8  History of Upper respiratory infection with cough and congestion (465 9) (J06 9)    Surgical History  1  History of Appendectomy   2   History of Bronchoscopy (Therap) W/ EBUS Guid Transendoscopic, For Periph Lesions   3  History of Flexible Fiberoptic Laryngoscopy (Therapeutic)   4  History of Gallbladder Surgery   5  History of Knee Replacement   6  History of Mohs Micrographic Surgery Face   7  History of Tonsillectomy With Adenoidectomy   8  History of Total Abdominal Hysterectomy  Surgical History Reviewed: The surgical history was reviewed and updated today  Family History  Mother    1  Family history of    2  Family history of cerebrovascular accident (V17 1) (Z82 3)   3  Family history of dementia (V17 2) (Z81 8)   4  Family history of diabetes mellitus (V18 0) (Z83 3)   5  Family history of hypertension (V17 49) (Z82 49)   6  Family history of thyroid disease (V18 19) (Z83 49)  Father    9  Family history of arthritis (V17 7) (Z82 61)   8  Family history of cardiac disorder (V17 49) (Z82 49)   9  Family history of cerebrovascular accident (V17 1) (Z82 3)   10  Family history of hypertension (V17 49) (Z82 49)  Maternal Grandfather    11  Family history of Cancer of unknown origin  Paternal Uncle    15  Family history of lung cancer (V16 1) (Z80 1)  Family History    13  Family history of Cancer   14  Family history of Depression   15  Family history of Diabetes Mellitus (V18 0)   16  Family history of Essential Hypercholesterolemia   17  Family history of Heart Disease (V17 49)   18  Family history of Hypertension (V17 49)   19  Family history of Stroke Syndrome (V17 1)  Family History Reviewed: The family history was reviewed and updated today  Social History   · Daily caffeinated coffee consumption   · Former smoker (V15 82) (H05 736)   · Lives with family   · Never Drank Alcohol   · No drug use   · Retired   ·   Social History Reviewed: The social history was reviewed and updated today  The social history was reviewed and is unchanged  Current Meds   1  Calcium 250 MG Oral Capsule; tab strength unknown; Therapy: 42Umz4529 to Recorded   2  Eliquis 5 MG Oral Tablet; 1 TABLET TWICE DAILY  Requested for: 60CAV6781; Last   Rx:48Dkh0115 Ordered   3  MetFORMIN HCl - 1000 MG Oral Tablet; TAKE 1 TABLET TWICE DAILY; Therapy: 78DWO8225 to ([phone])  Requested for: 55PEF3916; Last   Rx:12Jan2017 Ordered   4  Metoprolol Tartrate 25 MG Oral Tablet; TAKE 1 TABLET TWICE DAILY  Requested for:   20GND1392; Last FZ:47PCW0394 Ordered   5  Omeprazole 20 MG Oral Tablet Delayed Release; Take 1 tablet daily  Requested for:   12Jan2017; Last Rx:12Jan2017 Ordered   6  PredniSONE 10 MG Oral Tablet; 40 mg (4 tabs ) daily for 1 month then 30 mg daily for 1   month then 20 mg daily for 1 month; Therapy: 58KTK0747 to (Evaluate:77Aod0262)  Requested for: 46XUZ9119; Last   Rx:01Jun2017 Ordered   7  PredniSONE 20 MG Oral Tablet; Take 2 tablets daily as directed; Therapy: 16SFT1624 to  Requested for: 20Jun2017 Recorded   8  Sotalol HCl - 80 MG Oral Tablet; TAKE 1 TABLET EVERY 12 HOURS DAILY  Requested   for: 74LQY3388; Last Rx:13Jan2017 Ordered   9  Sulfamethoxazole-Trimethoprim 800-160 MG Oral Tablet; TAKE 1 TABLET DAILY ON   MONDAY, WEDNESDAY, AND FRIDAY; Therapy: 15QFS7339 to (Last Rx:97Dbw5172)  Requested for: 29LLQ2063 Ordered   10  Tradjenta 5 MG Oral Tablet; Take 1 tablet daily; Therapy: 50YSO5353 to (Evaluate:07Jan2018)  Requested for: 08EER4428; Last    Rx:12Jan2017 Ordered   11  Venlafaxine HCl ER 37 5 MG Oral Capsule Extended Release 24 Hour; Take 1 capsule    by mouth at  bedtime; Therapy: 29HGU7582 to [phone])  Requested for: 86LUN2805; Last    Rx:92Fkb0377 Ordered   12  Vitamin D 2000 UNIT Oral Tablet; TAKE 1 TAB BID AS DIRECTED; Therapy: 82PRQ4882 to Recorded  Medication List Reviewed: The medication list was reviewed and updated today  Allergies  1  Amoxil TABS   2  Crestor TABS   3  Zetia TABS   4  Pravachol TABS   5  Simvastatin TABS  6   Mold    Vitals  Vital Signs    Recorded: 55Hdz3838 12:52PM   Temperature 96 2 F Heart Rate 68   Respiration 18   Systolic 003   Diastolic 62   Height 5 ft 5 in   Weight 212 lb    BMI Calculated 35 28   BSA Calculated 2 03   O2 Saturation 96, RA     Physical Exam    Constitutional   General appearance: No acute distress, well appearing and well nourished  Ears, Nose, Mouth, and Throat   Nasal mucosa, septum, and turbinates: Normal without edema or erythema  Lips, teeth, and gums: Normal, good dentition  Oropharynx: Normal with no erythema, edema, exudate or lesions  Neck   Neck: Supple, symmetric, trachea midline, no masses  Jugular veins: Normal     Pulmonary   Chest: Normal     Respiratory effort: No increased work of breathing or signs of respiratory distress  Auscultation of lungs: Abnormal  -- Minimal crackles at both upper lung Zones anteriorly  Cardiovascular   Auscultation of heart: Normal rate and rhythm, normal S1 and S2, no murmurs  Examination of extremities for edema and/or varicosities: Normal     Abdomen   Abdomen: Soft, non-tender  Lymphatic   Palpation of lymph nodes in neck: No lymphadenopathy  Musculoskeletal   Gait and station: Normal     Digits and nails: Normal without clubbing or cyanosis  Neurologic   Mental Status: Normal  Not confused, no evidence of dementia, good comprehension, good concentration  Skin   Skin and subcutaneous tissue: Limited exam shows no rash  Psychiatric   Orientation to person, place and time: Normal     Mood and affect: Normal        Procedure  We did 6 minute walk test in our office today: patient had pulse ox 96% on room air at baseline with heartrate of 61, she was able to walk for total 6 minutes and 273 m with no desaturation, her pulse ox remained 93-96% with heartrate between 90-94  Health Management  Well female exam with routine gynecological exam   BREAST EXAM; every 1 year; Next Due: 65Gmi9853;  Overdue    Future Appointments    Date/Time Provider Specialty Site   10/03/2017 08:20 AM [place] PHYLLIS Franks  Cardiology [address]   2017 08:15 AM Herlinda Apgar), M D  Hematology Oncology [facility]   2017 10:40 AM PHYLLIS Thomson  Pulmonary [facility] Shaun Douglass   2017 09:45 AM Daisy Francisco DO [address]   [facility] 10[facility]     Signatures   Electronically signed by :  PHYLLIS Lindo ; Aug 30 2017  3:51PM EST                       (Author)

## 2017-11-06 ENCOUNTER — ALLSCRIPTS OFFICE VISIT (OUTPATIENT)
Dept: OTHER | Facility: OTHER | Age: 67
End: 2017-11-06

## 2017-11-07 NOTE — PROGRESS NOTES
Assessment  1  Acute non-recurrent maxillary sinusitis (461 0) (J01 00)    Plan  Acute non-recurrent maxillary sinusitis    · Sulfamethoxazole-Trimethoprim 800-160 MG Oral Tablet (Bactrim DS); TAKE 1  TABLET TWICE DAILY  Anxiety, Depression    · Venlafaxine HCl ER 37 5 MG Oral Capsule Extended Release 24 Hour; Take 1  capsule by mouth at  bedtime  Health Maintenance    · Calcium + D3 TABS; Take 1 tablet twice daily   · Vitamin D 2000 UNIT Oral Tablet; TAKE 1 TAB BID AS DIRECTED  Unlinked    · Ventolin  (90 Base) MCG/ACT Inhalation Aerosol Solution; INHALE 2  PUFFS EVERY 4-6 HOURS AS NEEDED    Chief Complaint  Patient here with post nasal drip pressure in between eyes nasal congestion      History of Present Illness  HPI: recent death of father- had strokefuneral, er and icu visitbactrim   Cold Symptoms: Lucio Fernando presents with complaints of cold symptoms  Associated symptoms include nasal congestion,-- runny nose,-- post nasal drainage,-- sore throat,-- facial pressure,-- facial pain,-- headache,-- wheezing-- and-- diarrhea, but-- no dry cough,-- no productive cough,-- no plugged ear(s),-- no ear pain,-- no nausea,-- no vomiting,-- no fever-- and-- no chills  Review of Systems    Constitutional: as noted in HPI    ENT: as noted in HPI  Cardiovascular: as noted in HPI  Respiratory: as noted in HPI  Breasts: no complaints of breast pain, breast lump or nipple discharge  Gastrointestinal: as noted in HPI  Genitourinary: no complaints of dysuria, no incontinence, no pelvic pain, no dysmenorrhea, no vaginal discharge or abnormal vaginal bleeding  Musculoskeletal: no complaints of arthralgia, no myalgia, no joint swelling or stiffness, no limb pain or swelling  Integumentary: no complaints of skin rash or lesion, no itching or dry skin, no skin wounds  Neurological: no complaints of headache, no confusion, no numbness or tingling, no dizziness or fainting  Active Problems  1  Abnormal chest CT (793 2) (R93 8)   2  Acid reflux (530 81) (K21 9)   3  Adenocarcinoma of right lung, stage 4 (162 9) (C34 91)   4  Anxiety (300 00) (F41 9)   5  Atrial fibrillation (427 31) (I48 91)   6  Benign essential hypertension (401 1) (I10)   7  Cancer related pain (338 3) (G89 3)   8  Chemical pneumonitis (506 0,E980 9) (J68 0)   9  Chronic diastolic congestive heart failure (428 32,428 0) (I50 32)   10  Chronic left shoulder pain (719 41,338 29) (M25 512,G89 29)   11  Colon cancer screening (V76 51) (Z12 11)   12  Diabetes mellitus type 2, uncomplicated (294 35) (S33 6)   13  Encounter for eye exam (V72 0) (Z01 00)   14  Encounter for screening mammogram for breast cancer (V76 12) (Z12 31)   15  Hospital discharge follow-up (V67 59) (Z09)   16  Hyperlipidemia (272 4) (E78 5)   17  Hypoxemia (799 02) (R09 02)   18  Insomnia (780 52) (G47 00)   19  Mammogram abnormal (793 80) (R92 8)   20  Metastatic bone cancer (170 9) (C41 9)   21  Mild depression (311) (F32 0)   22  Need for prophylactic vaccination against Streptococcus pneumoniae (pneumococcus)    (V03 82) (Z23)   23  Need for prophylactic vaccination and inoculation against influenza (V04 81) (Z23)   24  Osteoporosis screening (V82 81) (Z13 820)   25  Pain in joint of left shoulder (719 41) (M25 512)   26  Screening for colon cancer (V76 51) (Z12 11)   27  Screening for glaucoma (V80 1) (Z13 5)   28  Screening for other and unspecified genitourinary condition (V81 6) (Z13 89)   29  Special screening for other neurological conditions (V80 09) (Z13 89)   30  Vitamin D deficiency (268 9) (E55 9)   31  Well female exam with routine gynecological exam (V72 31) (Z01 419)    Past Medical History  1  History of Community acquired pneumonia (5) (J18 9)   2  History of arthritis (V13 4) (Z87 39)   3  History of cancer of uterus (V10 42) (Z85 42)   4  History of hypertension (V12 59) (Z86 79)   5  History of radiation therapy (V15 3) (Z92 3)   6   History of Malignant neoplasm without specification of site (199 1) (C80 1)   7  History of Racing heart beat (785 0) (R00 0)   8  History of Skin cancer, basal cell (173 91) (C44 91)   9  History of Upper respiratory infection with cough and congestion (465 9) (J06 9)  Active Problems And Past Medical History Reviewed: The active problems and past medical history were reviewed and updated today  Family History  Mother    1  Family history of    2  Family history of cerebrovascular accident (V17 1) (Z82 3)   3  Family history of dementia (V17 2) (Z81 8)   4  Family history of diabetes mellitus (V18 0) (Z83 3)   5  Family history of hypertension (V17 49) (Z82 49)   6  Family history of thyroid disease (V18 19) (Z83 49)  Father    9  Family history of arthritis (V17 7) (Z82 61)   8  Family history of cardiac disorder (V17 49) (Z82 49)   9  Family history of cerebrovascular accident (V17 1) (Z82 3)   10  Family history of hypertension (V17 49) (Z82 49)  Maternal Grandfather    11  Family history of Cancer of unknown origin  Paternal Uncle    15  Family history of lung cancer (V16 1) (Z80 1)  Family History    13  Family history of Cancer   14  Family history of Depression   15  Family history of Diabetes Mellitus (V18 0)   16  Family history of Essential Hypercholesterolemia   17  Family history of Heart Disease (V17 49)   18  Family history of Hypertension (V17 49)   19  Family history of Stroke Syndrome (V17 1)  Family History Reviewed: The family history was reviewed and updated today  Social History   · Daily caffeinated coffee consumption   · Former smoker (V15 82) (T81 312)   · Lives with family   · Never Drank Alcohol   · No drug use   · Retired   ·   The social history was reviewed and updated today  Surgical History  1  History of Appendectomy   2  History of Bronchoscopy (Therap) W/ EBUS Guid Transendoscopic, For Periph Lesions   3   History of Flexible Fiberoptic Laryngoscopy (Therapeutic)   4  History of Gallbladder Surgery   5  History of Knee Replacement   6  History of Mohs Micrographic Surgery Face   7  History of Tonsillectomy With Adenoidectomy   8  History of Total Abdominal Hysterectomy  Surgical History Reviewed: The surgical history was reviewed and updated today  Current Meds   1  Calcium + D3 TABS; Take 1 tablet twice daily; Therapy: (Recorded:06Nov2017) to Recorded   2  Eliquis 5 MG Oral Tablet; take one tablet by mouth twice daily; Therapy: 49DKW7185 to [phone])  Requested for: 65EBV7058; Last   Rx:81Vih9309 Ordered   3  MetFORMIN HCl - 1000 MG Oral Tablet; TAKE 1 TABLET TWICE DAILY; Therapy: 47KHF5180 to (22 185738)  Requested for: 22SRI0651; Last   Rx:12Jan2017 Ordered   4  Metoprolol Tartrate 25 MG Oral Tablet; TAKE 1 TABLET TWICE DAILY  Requested for:   63UEW8633; Last XC:27EGH3201 Ordered   5  Omeprazole 20 MG Oral Tablet Delayed Release; Take 1 tablet daily  Requested for:   12Jan2017; Last Rx:12Jan2017 Ordered   6  Sotalol HCl - 80 MG Oral Tablet; TAKE 1 TABLET EVERY 12 HOURS DAILY  Requested   for: 35EQI0282; Last Rx:13Jan2017 Ordered   7  Sulfamethoxazole-Trimethoprim 800-160 MG Oral Tablet; take 1 tablet by mouth Mon,   Wed, Frid; Therapy: 83WEC1809 to Recorded   8  Tradjenta 5 MG Oral Tablet; Take 1 tablet by mouth  daily; Therapy: 22DJT6318 to (LGUAVWOE:77JRD4691)  Requested for: 23Oct2017; Last   LR:11HZF9301 Ordered   9  Tylenol Extra Strength 500 MG Oral Tablet; take 2 tablet twice daily; [facility] to Recorded   10  Venlafaxine HCl ER 37 5 MG Oral Capsule Extended Release 24 Hour; Take 1 capsule    by mouth at  bedtime; Therapy: 19TKL0749 to [phone])  Requested for: 93WWF6449; Last    Rx:31Ugo6924 Ordered   11  Ventolin  (90 Base) MCG/ACT Inhalation Aerosol Solution; INHALE 2 PUFFS    EVERY 4-6 HOURS AS NEEDED; Therapy: ([id number]) to Recorded   12   Vitamin D 2000 UNIT Oral Tablet; TAKE 1 TAB BID AS DIRECTED; Therapy: 72WNP6039 to (Tawnya Brent) Recorded    The medication list was reviewed and updated today  Allergies  1  Amoxil TABS   2  Crestor TABS   3  Zetia TABS   4  Pravachol TABS   5  Simvastatin TABS  6  Mold    Vitals   Recorded: 28ONF4295 11:15AM   Temperature 98 2 F   Heart Rate 72   Respiration 18   Systolic 622   Diastolic 70   Height 5 ft 5 in   Weight 206 lb 6 oz   BMI Calculated 34 34   BSA Calculated 2     Physical Exam    Constitutional   General appearance: No acute distress, well appearing and well nourished  Eyes   Conjunctiva and lids: No swelling, erythema or discharge  Ears, Nose, Mouth, and Throat   External inspection of ears and nose: Normal     Otoscopic examination: Tympanic membranes translucent with normal light reflex  Canals patent without erythema  Nasal mucosa, septum, and turbinates: Normal without edema or erythema  Oropharynx: Abnormal  -- pnd  Pulmonary   Respiratory effort: No increased work of breathing or signs of respiratory distress  Auscultation of lungs: Clear to auscultation  Cardiovascular   Auscultation of heart: Normal rate and rhythm, normal S1 and S2, without murmurs  Examination of extremities for edema and/or varicosities: Normal     Abdomen   Abdomen: Non-tender, no masses  Liver and spleen: No hepatomegaly or splenomegaly  Lymphatic   Palpation of lymph nodes in neck: No lymphadenopathy  Musculoskeletal   Gait and station: Normal     Skin   Skin and subcutaneous tissue: Normal without rashes or lesions  Future Appointments    Date/Time Provider Specialty Site   12/20/2017 08:15 AM PHYLLIS Mo  Hematology Oncology [facility]   12/14/2017 10:40 AM PHYLLIS Tamez   [facility]   11/08/2017 10:00 AM Michelle Lindquist, [facility] Electronically signed by : Letha Hudson DO; Nov 6 2017 11:35AM EST                       (Author)

## 2017-11-08 ENCOUNTER — ALLSCRIPTS OFFICE VISIT (OUTPATIENT)
Dept: OTHER | Facility: OTHER | Age: 67
End: 2017-11-08

## 2017-11-15 NOTE — PROGRESS NOTES
Assessment    1  Anxiety (300 00) (F41 9)   2  Adenocarcinoma of right lung, stage 4 (162 9) (C34 91)    Plan  Adenocarcinoma of right lung, stage 4, Atrial fibrillation, Chronic diastolic congestiveheart failure, Metastatic bone cancer    · Palliative Flow Sheet; Status:Complete;   Done: 50WYD1695 09:58AM   Performed: In Office; [phone]; Last Updated By:Jasper Darling; 11/8/2017 9:58:44 AM;Ordered;of right lung, stage 4, Atrial fibrillation, Chronic diastolic congestive heart failure, Metastatic bone cancer; Ordered By:Kari Mcghee; Discussion/Summary  Discussion Summary:   Feeling well- no pain or other symptoms  Anxiety stable - continues on venlafaxine  No refill needed today  RT was completed  Will have follow up PET scan soon  Call office with any questions or concerns  Counseling Documentation With Imm: The patient was counseled regarding instructions for management  Goals and Barriers: The patient has the current Goals: Maintain current quality of life  The patent has the current Barriers: Metastatic cancer  Patient's Capacity to Self-Care: Patient is able to Self-Care  Medication SE Review and Pt Understands Tx: Possible side effects of new medications were reviewed with the patient/guardian today  The treatment plan was reviewed with the patient/guardian  The patient/guardian understands and agrees with the treatment plan   Self Referrals:   Self Referrals: No      Chief Complaint  Chief Complaint Free Text Note Form: Follow up symptoms, metastatic adenocarcinoma      History of Present Illness  HPI: 69yo female with metastatic adenocarcinoma of lung, mets to lymph nodes and bone  S/p palliative RT to thoracic spinal met in 12/2016  Immunotherapy was previously discontinued due to pneumonitis  Found to have lesion of left scapular region, s/p palliative radiation in 09-10/2017  Not currently on chemo, will have PET scan as follow up in about 1 month  been feeling very well   Not having any pain or other symptoms  Mood, anxiety and energy levels are good  Sleeping well  Remains on venlafaxine for anxiety  Since last visit, her father has   States she is doing well since the loss  She is now providing more care to her disabled brother  Review of Systems  Complete-Female:  Constitutional: No fever, no chills, feels well, no tiredness, no recent weight gain or weight loss  Cardiovascular: No complaints of slow heart rate, no fast heart rate, no chest pain, no palpitations, no leg claudication, no lower extremity edema  Respiratory: No complaints of shortness of breath, no wheezing, no cough, no SOB on exertion, no orthopnea, no PND  Gastrointestinal: No complaints of abdominal pain, no constipation, no nausea or vomiting, no diarrhea, no bloody stools  Musculoskeletal: No complaints of arthralgias, no myalgias, no joint swelling or stiffness, no limb pain or swelling  Psychiatric: Not suicidal, no sleep disturbance, no anxiety or depression, no change in personality, no emotional problems  ROS Reviewed:   ROS reviewed  Active Problems    1  Abnormal chest CT (793 2) (R93 8)   2  Acid reflux (530 81) (K21 9)   3  Acute non-recurrent maxillary sinusitis (461 0) (J01 00)   4  Adenocarcinoma of right lung, stage 4 (162 9) (C34 91)   5  Anxiety (300 00) (F41 9)   6  Atrial fibrillation (427 31) (I48 91)   7  Benign essential hypertension (401 1) (I10)   8  Cancer related pain (338 3) (G89 3)   9  Chemical pneumonitis (506 0,E980 9) (J68 0)   10  Chronic diastolic congestive heart failure (428 32,428 0) (I50 32)   11  Chronic left shoulder pain (719 41,338 29) (M25 512,G89 29)   12  Colon cancer screening (V76 51) (Z12 11)   13  Diabetes mellitus type 2, uncomplicated (859 59) (V18 4)   14  Encounter for eye exam (V72 0) (Z01 00)   15  Encounter for screening mammogram for breast cancer (V76 12) (Z12 31)   16  Hospital discharge follow-up (V67 59) (Z09)   17   Hyperlipidemia (272 4) (E78 5)   18  Hypoxemia (799 02) (R09 02)   19  Insomnia (780 52) (G47 00)   20  Mammogram abnormal (793 80) (R92 8)   21  Metastatic bone cancer (170 9) (C41 9)   22  Mild depression (311) (F32 0)   23  Need for prophylactic vaccination against Streptococcus pneumoniae (pneumococcus)  (V03 82) (Z23)   24  Need for prophylactic vaccination and inoculation against influenza (V04 81) (Z23)   25  Osteoporosis screening (V82 81) (Z13 820)   26  Pain in joint of left shoulder (719 41) (M25 512)   27  Screening for colon cancer (V76 51) (Z12 11)   28  Screening for glaucoma (V80 1) (Z13 5)   29  Screening for other and unspecified genitourinary condition (V81 6) (Z13 89)   30  Special screening for other neurological conditions (V80 09) (Z13 89)   31  Vitamin D deficiency (268 9) (E55 9)   32  Well female exam with routine gynecological exam (V72 31) (Z01 419)    Past Medical History  1  History of Community acquired pneumonia (5) (J18 9)   2  History of arthritis (V13 4) (Z87 39)   3  History of cancer of uterus (V10 42) (Z85 42)   4  History of hypertension (V12 59) (Z86 79)   5  History of radiation therapy (V15 3) (Z92 3)   6  History of Malignant neoplasm without specification of site (199 1) (C80 1)   7  History of Racing heart beat (785 0) (R00 0)   8  History of Skin cancer, basal cell (173 91) (C44 91)   9  History of Upper respiratory infection with cough and congestion (465 9) (J06 9)    Surgical History  1  History of Appendectomy   2  History of Bronchoscopy (Therap) W/ EBUS Guid Transendoscopic, For Periph Lesions   3  History of Flexible Fiberoptic Laryngoscopy (Therapeutic)   4  History of Gallbladder Surgery   5  History of Knee Replacement   6  History of Mohs Micrographic Surgery Face   7  History of Tonsillectomy With Adenoidectomy   8  History of Total Abdominal Hysterectomy  Surgical History Reviewed: The surgical history was reviewed and updated today  Family History  Mother    1   Family history of    2  Family history of cerebrovascular accident (V17 1) (Z82 3)   3  Family history of dementia (V17 2) (Z81 8)   4  Family history of diabetes mellitus (V18 0) (Z83 3)   5  Family history of hypertension (V17 49) (Z82 49)   6  Family history of thyroid disease (V18 19) (Z83 49)  Father    9  Family history of arthritis (V17 7) (Z82 61)   8  Family history of cardiac disorder (V17 49) (Z82 49)   9  Family history of cerebrovascular accident (V17 1) (Z82 3)   10  Family history of hypertension (V17 49) (Z82 49)  Maternal Grandfather    11  Family history of Cancer of unknown origin  Paternal Uncle    15  Family history of lung cancer (V16 1) (Z80 1)  Family History    13  Family history of Cancer   14  Family history of Depression   15  Family history of Diabetes Mellitus (V18 0)   16  Family history of Essential Hypercholesterolemia   17  Family history of Heart Disease (V17 49)   18  Family history of Hypertension (V17 49)   19  Family history of Stroke Syndrome (V17 1)  Family History Reviewed: The family history was reviewed and updated today  Social History     · Daily caffeinated coffee consumption   · Former smoker (V15 82) (G67 127)   · Lives with family   · Never Drank Alcohol   · No drug use   · Retired   ·   Social History Reviewed: The social history was reviewed and is unchanged  Current Meds   1  Calcium + D3 TABS; Take 1 tablet twice daily; Therapy: (Recorded:2017) to Recorded   2  Eliquis 5 MG Oral Tablet; take one tablet by mouth twice daily; Therapy: 36SZB5194 to [phone])  Requested for: 79TBS3880; Last Rx:42Tzv6924 Ordered   3  MetFORMIN HCl - 1000 MG Oral Tablet; TAKE 1 TABLET TWICE DAILY; Therapy: 73LJG5570 to (72 240 26 09)  Requested for: 95MPO4110; Last Rx:2017 Ordered   4  Metoprolol Tartrate 25 MG Oral Tablet; TAKE 1 TABLET TWICE DAILY  Requested for: 13AEB8654; Last ZB:08UGJ8872 Ordered   5   Omeprazole 20 MG Oral Tablet Delayed Release; Take 1 tablet daily  Requested for: 12Jan2017; Last Rx:12Jan2017 Ordered   6  Sotalol HCl - 80 MG Oral Tablet; TAKE 1 TABLET EVERY 12 HOURS DAILY  Requested for: 02YYU6471; Last Rx:13Jan2017 Ordered   7  Sulfamethoxazole-Trimethoprim 800-160 MG Oral Tablet; take 1 tablet by mouth Mon, Wed, Frid; Therapy: 23YHA0826 to Recorded   8  Sulfamethoxazole-Trimethoprim 800-160 MG Oral Tablet; TAKE 1 TABLET TWICE DAILY; Therapy: 34ONR2363 to (Complete:16Nov2017)  Requested for: 44TYA1910; Last Rx:06Nov2017 Ordered   9  Tradjenta 5 MG Oral Tablet; Take 1 tablet by mouth  daily; Therapy: 90MIT7686 to (VLOAJFUA:76VPP9134)  Requested for: 23Oct2017; Last QI:68BAY0854 Ordered   10  Tylenol Extra Strength 500 MG Oral Tablet; take 2 tablet twice daily; Therapy: (Kristen Parkinson) to Recorded   11  Venlafaxine HCl ER 37 5 MG Oral Capsule Extended Release 24 Hour; Take 1 capsule  by mouth at  bedtime; Therapy: 53FQR1622 to (Cal Baxter)  Requested for: 89BEY0182; Last  Rx:71Jjv1249 Ordered   12  Ventolin  (90 Base) MCG/ACT Inhalation Aerosol Solution; INHALE 2 PUFFS  EVERY 4-6 HOURS AS NEEDED; Therapy: ([phone]) to Recorded   13  Vitamin D 2000 UNIT Oral Tablet; TAKE 1 TAB BID AS DIRECTED; Therapy: 57QKV4217 to (Evaluate:06Nov2026) Recorded  Medication List Reviewed: The medication list was reviewed and updated today  Allergies  1  Amoxil TABS   2  Crestor TABS   3  Zetia TABS   4  Pravachol TABS   5  Simvastatin TABS  6  Mold    Vitals  Vital Signs    Recorded: 66RDC0946 10:17AM   Temperature 98 2 F   Heart Rate 65   Respiration 18   Systolic 835   Diastolic 70   Height 5 ft 5 in   Weight 207 lb    BMI Calculated 34 45   BSA Calculated 2 01   O2 Saturation 96       Physical Exam   Constitutional  General appearance: No acute distress, well appearing and well nourished  Eyes  Conjunctiva and lids: No swelling, erythema or discharge     Pulmonary  Respiratory effort: No increased work of breathing or signs of respiratory distress  Neurologic  Cranial nerves: Cranial nerves 2-12 intact  Psychiatric  Orientation to person, place, and time: Normal    Mood and affect: Normal          Results/Data  Palliative Flow Sheet 54LDY9298 09:58AM Miranda Sotelo     Test Name Result Flag Reference   Pain 0     Tiredness 1     Nausea 0     Depression 0     Anxious 0     Drowsy 0     Best Appetite 0     Best Feeling of Well Being 0     Shortness of Breath 2         Attending Note  Collaborating Physician Note: Collaborating Physician: I did not interview and examine the patient,-- I discussed the case with the Advanced Practitioner and reviewed the note-- and-- I agree with the Advanced Practitioner note  Future Appointments    Date/Time Provider Specialty Site   12/20/2017 08:15 AM PHYLLIS Pacheco  Hematology Oncology CANCER CARE [facility] MEDICAL ONCOLOGY   12/14/2017 10:40 AM PHYLLIS Spears   Pulmonary Medicine [facility]   12/28/2017 08:15 AM Petar Rice DO Family Medicine [address] [facility]       Signatures   Electronically signed by : Amaury Gotti, [address] [place] [address]; Nov 10 2017 12:41PM EST                       (Author)    Electronically signed by : PHYLLIS Mcgraw ; Nov 14 2017  8:40AM EST                       (Author)

## 2017-11-21 ENCOUNTER — APPOINTMENT (OUTPATIENT)
Dept: RADIATION ONCOLOGY | Facility: HOSPITAL | Age: 67
End: 2017-11-21
Payer: MEDICARE

## 2017-11-21 ENCOUNTER — GENERIC CONVERSION - ENCOUNTER (OUTPATIENT)
Dept: OTHER | Facility: OTHER | Age: 67
End: 2017-11-21

## 2017-11-21 PROCEDURE — 99214 OFFICE O/P EST MOD 30 MIN: CPT | Performed by: RADIOLOGY

## 2017-12-01 ENCOUNTER — GENERIC CONVERSION - ENCOUNTER (OUTPATIENT)
Dept: OTHER | Facility: OTHER | Age: 67
End: 2017-12-01

## 2017-12-01 DIAGNOSIS — E11.9 TYPE 2 DIABETES MELLITUS WITHOUT COMPLICATIONS (HCC): ICD-10-CM

## 2017-12-01 DIAGNOSIS — C34.91 MALIGNANT NEOPLASM OF UNSPECIFIED PART OF RIGHT BRONCHUS OR LUNG (HCC): ICD-10-CM

## 2017-12-01 DIAGNOSIS — Z12.31 ENCOUNTER FOR SCREENING MAMMOGRAM FOR MALIGNANT NEOPLASM OF BREAST: ICD-10-CM

## 2017-12-08 ENCOUNTER — TRANSCRIBE ORDERS (OUTPATIENT)
Dept: ADMINISTRATIVE | Age: 67
End: 2017-12-08

## 2017-12-08 ENCOUNTER — HOSPITAL ENCOUNTER (OUTPATIENT)
Dept: RADIOLOGY | Age: 67
Discharge: HOME | End: 2017-12-08
Payer: MEDICARE

## 2017-12-08 ENCOUNTER — APPOINTMENT (OUTPATIENT)
Dept: LAB | Age: 67
End: 2017-12-08
Payer: MEDICARE

## 2017-12-08 VITALS — WEIGHT: 211 LBS | BODY MASS INDEX: 35.11 KG/M2

## 2017-12-08 DIAGNOSIS — C41.9 MALIGNANT NEOPLASM OF BONE AND ARTICULAR CARTILAGE (HCC): ICD-10-CM

## 2017-12-08 DIAGNOSIS — E11.9 TYPE 2 DIABETES MELLITUS WITHOUT COMPLICATIONS (HCC): ICD-10-CM

## 2017-12-08 DIAGNOSIS — C34.91 MALIGNANT NEOPLASM OF UNSPECIFIED PART OF RIGHT BRONCHUS OR LUNG (HCC): ICD-10-CM

## 2017-12-08 LAB
ALBUMIN SERPL BCP-MCNC: 3.2 G/DL (ref 3.5–5)
ALP SERPL-CCNC: 76 U/L (ref 46–116)
ALT SERPL W P-5'-P-CCNC: 24 U/L (ref 12–78)
ANION GAP SERPL CALCULATED.3IONS-SCNC: 6 MMOL/L (ref 4–13)
AST SERPL W P-5'-P-CCNC: 30 U/L (ref 5–45)
BASOPHILS # BLD AUTO: 0.03 THOUSANDS/ΜL (ref 0–0.1)
BASOPHILS NFR BLD AUTO: 1 % (ref 0–1)
BILIRUB SERPL-MCNC: 0.58 MG/DL (ref 0.2–1)
BUN SERPL-MCNC: 19 MG/DL (ref 5–25)
CALCIUM SERPL-MCNC: 9.2 MG/DL (ref 8.3–10.1)
CHLORIDE SERPL-SCNC: 103 MMOL/L (ref 100–108)
CO2 SERPL-SCNC: 26 MMOL/L (ref 21–32)
CREAT SERPL-MCNC: 0.81 MG/DL (ref 0.6–1.3)
EOSINOPHIL # BLD AUTO: 0.15 THOUSAND/ΜL (ref 0–0.61)
EOSINOPHIL NFR BLD AUTO: 3 % (ref 0–6)
ERYTHROCYTE [DISTWIDTH] IN BLOOD BY AUTOMATED COUNT: 15.2 % (ref 11.6–15.1)
EST. AVERAGE GLUCOSE BLD GHB EST-MCNC: 157 MG/DL
GFR SERPL CREATININE-BSD FRML MDRD: 74 ML/MIN/1.73SQ M
GLUCOSE P FAST SERPL-MCNC: 132 MG/DL (ref 65–99)
HBA1C MFR BLD: 7.1 % (ref 4.2–6.3)
HCT VFR BLD AUTO: 38.8 % (ref 34.8–46.1)
HGB BLD-MCNC: 12.3 G/DL (ref 11.5–15.4)
LYMPHOCYTES # BLD AUTO: 0.72 THOUSANDS/ΜL (ref 0.6–4.47)
LYMPHOCYTES NFR BLD AUTO: 13 % (ref 14–44)
MCH RBC QN AUTO: 25.6 PG (ref 26.8–34.3)
MCHC RBC AUTO-ENTMCNC: 31.7 G/DL (ref 31.4–37.4)
MCV RBC AUTO: 81 FL (ref 82–98)
MONOCYTES # BLD AUTO: 0.51 THOUSAND/ΜL (ref 0.17–1.22)
MONOCYTES NFR BLD AUTO: 9 % (ref 4–12)
NEUTROPHILS # BLD AUTO: 4.33 THOUSANDS/ΜL (ref 1.85–7.62)
NEUTS SEG NFR BLD AUTO: 74 % (ref 43–75)
NRBC BLD AUTO-RTO: 0 /100 WBCS
PLATELET # BLD AUTO: 238 THOUSANDS/UL (ref 149–390)
PMV BLD AUTO: 11.3 FL (ref 8.9–12.7)
POTASSIUM SERPL-SCNC: 4.3 MMOL/L (ref 3.5–5.3)
PROT SERPL-MCNC: 6.9 G/DL (ref 6.4–8.2)
RBC # BLD AUTO: 4.81 MILLION/UL (ref 3.81–5.12)
SODIUM SERPL-SCNC: 135 MMOL/L (ref 136–145)
WBC # BLD AUTO: 5.75 THOUSAND/UL (ref 4.31–10.16)

## 2017-12-08 PROCEDURE — 78815 PET IMAGE W/CT SKULL-THIGH: CPT

## 2017-12-08 PROCEDURE — 85025 COMPLETE CBC W/AUTO DIFF WBC: CPT

## 2017-12-08 PROCEDURE — 36415 COLL VENOUS BLD VENIPUNCTURE: CPT

## 2017-12-08 PROCEDURE — 80053 COMPREHEN METABOLIC PANEL: CPT

## 2017-12-08 PROCEDURE — 83036 HEMOGLOBIN GLYCOSYLATED A1C: CPT

## 2017-12-08 PROCEDURE — 82948 REAGENT STRIP/BLOOD GLUCOSE: CPT

## 2017-12-08 PROCEDURE — A9552 F18 FDG: HCPCS

## 2017-12-08 RX ADMIN — IOHEXOL 5 ML: 240 INJECTION, SOLUTION INTRATHECAL; INTRAVASCULAR; INTRAVENOUS; ORAL at 09:14

## 2017-12-10 ENCOUNTER — GENERIC CONVERSION - ENCOUNTER (OUTPATIENT)
Dept: OTHER | Facility: OTHER | Age: 67
End: 2017-12-10

## 2017-12-11 LAB — GLUCOSE SERPL-MCNC: 107 MG/DL (ref 65–140)

## 2017-12-14 ENCOUNTER — ALLSCRIPTS OFFICE VISIT (OUTPATIENT)
Dept: OTHER | Facility: OTHER | Age: 67
End: 2017-12-14

## 2017-12-15 NOTE — PROGRESS NOTES
Assessment  1  Adenocarcinoma of right lung, stage 4 (162 9) (C34 91)   2  Abnormal chest CT (793 2) (R93 8)    Plan  Abnormal chest CT    · Follow-up visit in 4 Months Evaluation and Treatment  Follow-up  Status: Hold For -Scheduling  Requested for: 79BFL4929   Ordered; For: Abnormal chest CT; Ordered By: Janette Cruz Performed:  Due: 73MCI8610    Results/Data  [facility]   Results  Other Labs reviewed: Bicarb 26, creatinine 0 81, hemoglobin 12 3, platelets 899  PET Scan reviewed on PACs with the patient: IMPRESSION:1  Significantly decreased hypermetabolism of left scapular metastatic lesion, compatible with response to therapy  Viable tumor however likely remains in a small medial focus  2  New hypermetabolic consolidation in the left upper posterior lateral lung along the pleura, likely posttreatment inflammatory changes  3  Additional bilateral hypermetabolic lung infiltrates have improved  Residual mildly hypermetabolic lung densities remain bilaterally  4  Otherwise no new hypermetabolic metastases demonstrated  Discussion/Summary  Discussion Summary:   - stage IV adenocarcinoma, currently on chemotherapy but she has a follow-up with oncology with a new PET scan and most likely she will be started on new regimen - 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, off prednisone, and resolved hypoxia - Up-to-date with vaccinations  Counseling Documentation With Imm: The patient was counseled regarding diagnostic results,-- instructions for management  Goals and Barriers: The patient has the current Goals: To continue to be able to take care of her disabled brother to adopt dog  The patent has the current Barriers: None  Medication SE Review and Pt Understands Tx: The treatment plan was reviewed with the patient/guardian  The patient/guardian understands and agrees with the treatment plan      Active Problems  1   Abnormal chest CT (793 2) (R93 8)   2  Acid reflux (530 81) (K21 9)   3  Acute bronchitis due to other specified organisms (466 0) (J20 8)   4  Adenocarcinoma of right lung, stage 4 (162 9) (C34 91)   5  Anxiety (300 00) (F41 9)   6  Atrial fibrillation (427 31) (I48 91)   7  Benign essential hypertension (401 1) (I10)   8  Cancer related pain (338 3) (G89 3)   9  Chronic diastolic congestive heart failure (428 32,428 0) (I50 32)   10  Chronic left shoulder pain (719 41,338 29) (M25 512,G89 29)   11  Colon cancer screening (V76 51) (Z12 11)   12  Encounter for eye exam (V72 0) (Z01 00)   13  Encounter for screening mammogram for breast cancer (V76 12) (Z12 31)   14  Hospital discharge follow-up (V67 59) (Z09)   15  Hyperlipidemia (272 4) (E78 5)   16  Hypoxemia (799 02) (R09 02)   17  Insomnia (780 52) (G47 00)   18  Mammogram abnormal (793 80) (R92 8)   19  Metastatic bone cancer (170 9) (C41 9)   20  Mild depression (311) (F32 0)   21  Need for prophylactic vaccination against Streptococcus pneumoniae (pneumococcus)  (V03 82) (Z23)   22  Need for prophylactic vaccination and inoculation against influenza (V04 81) (Z23)   23  Osteoporosis screening (V82 81) (Z13 820)   24  Pain in joint of left shoulder (719 41) (M25 512)   25  Screening for colon cancer (V76 51) (Z12 11)   26  Screening for glaucoma (V80 1) (Z13 5)   27  Screening for other and unspecified genitourinary condition (V81 6) (Z13 89)   28  Special screening for other neurological conditions (V80 09) (Z13 89)   29  Vitamin D deficiency (268 9) (E55 9)   30  Well female exam with routine gynecological exam (V72 31) (Z01 419)    Chief Complaint  Chief Complaint Chronic Condition [facility]: Patient is here today for follow up of chronic conditions described in HPI        History of Present Illness  HPI: This is a 49-year-old very pleasant lady with past medical history of stage IV lung cancer who was treated initially with Keytruda and radiation therapy initially and developed drug-induced interstitial pneumonitis that was treated successfully with prednisone for few months and taper to off with significant improvement in infiltrates on repeat CT scan and also resolved hypoxia and patient has been off oxygen for few months now  Unfortunately she developed new left scapular bone metastasis that required local radiation therapy for pain and she finished that month ago and she is doing fine  Patient continues follow-up with Hematology Oncology and she is currently not on any immunotherapy or chemotherapy but she will be started on some treatment in the near future  She denies any significant complaints at this time, no shortness of breath or chronic cough, no fever or chills, no chest pain, no weight loss  She said lady lost her father last month and she is currently taking care of her brother at home  Review of Systems  Complete-Female - Pulm:  Constitutional: No fever, no chills, feels well, no tiredness, no recent weight gain or weight loss  Eyes: no complaints of vision problems  ENT: no rhinitis, no PND, no epistaxis  Cardiovascular: no palpitations, no chest pain  Respiratory: as noted in HPI  Gastrointestinal: no complaints of esophageal reflux, no abdominal pain  Genitourinary: no dysuria  Musculoskeletal: no arthralgias, no joint swelling, no myalgias  Integumentary: no rash, no lesions  Neurological: no headache, no fainting, no weakness  Psychiatric: no anxiety, no depression  Hematologic/Lymphatic: â no complaints of swollen glands  ROS Reviewed:   ROS reviewed  Past Medical History  1  History of Chemical pneumonitis (506 0,E980 9) (J68 0)   2  History of Community acquired pneumonia (5) (J18 9)   3  History of Diabetes mellitus type 2, uncomplicated (209 63) (L77 4)   4  History of arthritis (V13 4) (Z87 39)   5  History of cancer of uterus (V10 42) (Z85 42)   6  History of hypertension (V12 59) (Z86 79)   7   History of radiation therapy (V15 3) (Z92 3)   8  History of Malignant neoplasm without specification of site (199 1) (C80 1)   9  History of Racing heart beat (785 0) (R00 0)   10  History of Skin cancer, basal cell (173 91) (C44 91)   11  History of Upper respiratory infection with cough and congestion (465 9) (J06 9)    Surgical History  1  History of Appendectomy   2  History of Bronchoscopy (Therap) W/ EBUS Guid Transendoscopic, For Periph Lesions   3  History of Flexible Fiberoptic Laryngoscopy (Therapeutic)   4  History of Gallbladder Surgery   5  History of Knee Replacement   6  History of Mohs Micrographic Surgery Face   7  History of Tonsillectomy With Adenoidectomy   8  History of Total Abdominal Hysterectomy  Surgical History Reviewed: The surgical history was reviewed and updated today  Family History  Mother    1  Family history of    2  Family history of cerebrovascular accident (V17 1) (Z82 3)   3  Family history of dementia (V17 2) (Z81 8)   4  Family history of diabetes mellitus (V18 0) (Z83 3)   5  Family history of hypertension (V17 49) (Z82 49)   6  Family history of thyroid disease (V18 19) (Z83 49)  Father    9  Family history of arthritis (V17 7) (Z82 61)   8  Family history of cardiac disorder (V17 49) (Z82 49)   9  Family history of cerebrovascular accident (V17 1) (Z82 3)   10  Family history of hypertension (V17 49) (Z82 49)  Maternal Grandfather    11  Family history of Cancer of unknown origin  Paternal Uncle    15  Family history of lung cancer (V16 1) (Z80 1)  Family History    13  Family history of Cancer   14  Family history of Depression   15  Family history of Diabetes Mellitus (V18 0)   16  Family history of Essential Hypercholesterolemia   17  Family history of Heart Disease (V17 49)   18  Family history of Hypertension (V17 49)   19  Family history of Stroke Syndrome (V17 1)  Family History Reviewed: The family history was reviewed and updated today         Social History   · Daily caffeinated coffee consumption   · Former smoker (E87 35) (X63 166)   · Lives with family   · Never Drank Alcohol   · No drug use   · Retired   ·   Social History Reviewed: The social history was reviewed and updated today  The social history was reviewed and is unchanged  Current Meds   1  Calcium + D3 TABS; Take 1 tablet twice daily; Therapy: (Recorded:06Nov2017) to Recorded   2  Eliquis 5 MG Oral Tablet; take one tablet by mouth twice daily; Therapy: 48DNX8156 to )  Requested for: 66TCF1111; Last Rx:12Vah0492 Ordered   3  MetFORMIN HCl - 1000 MG Oral Tablet; TAKE 1 TABLET TWICE DAILY; Therapy: 36BSK1121 to ([phone])  Requested for: 11BHR9557; Last Rx:12Jan2017 Ordered   4  Metoprolol Tartrate 25 MG Oral Tablet; TAKE 1 TABLET TWICE DAILY  Requested for: 07IVR2542; Last TT:87VGK9090 Ordered   5  Omeprazole 20 MG Oral Capsule Delayed Release; Take 1 capsule by mouth  daily; Therapy: 23UCU8988 to (Ladon Peabody)  Requested for: 72TZT2476; Last Rx:13Nov2017 Ordered   6  Sotalol HCl - 80 MG Oral Tablet; Take 1 tablet by mouth  every 12 hours; Therapy: 21YSB8409 to (Ladon Peabody)  Requested for: 24ZKZ6012; Last Rx:13Nov2017 Ordered   7  Tradjenta 5 MG Oral Tablet; Take 1 tablet by mouth  daily; Therapy: 24UWL2532 to (XVWVYKRY:53JIU3016)  Requested for: 23Oct2017; Last MP:42FVX8965 Ordered   8  Tylenol Extra Strength 500 MG Oral Tablet; take 2 tablet twice daily; Therapy: (Lorrine Liming) to Recorded   9  Venlafaxine HCl ER 37 5 MG Oral Capsule Extended Release 24 Hour; Take 1 capsule by mouth at  bedtime; Therapy: 16ZUF3388 to (Evaluate:42Dlh5012)  Requested for: 85FUO8440; Last Rx:13Nov2017 Ordered   10  Ventolin  (90 Base) MCG/ACT Inhalation Aerosol Solution; INHALE 2 PUFFS  EVERY 4-6 HOURS AS NEEDED; Therapy: (75 129 77) to Recorded   11  Vitamin D 2000 UNIT Oral Tablet; TAKE 1 TAB BID AS DIRECTED;   Therapy: 02WZZ9002 to (Merlin Hernandez) Recorded  Medication List Reviewed: The medication list was reviewed and updated today  Allergies  1  Amoxil TABS   2  Crestor TABS   3  Zetia TABS   4  Pravachol TABS   5  Simvastatin TABS  6  Mold    Vitals  Vital Signs    Recorded: 95GYA7963 10:43AM   Temperature 97 3 F   Heart Rate 58   Respiration 16   Systolic 636   Diastolic 68   Height 5 ft 5 in   Weight 211 lb    BMI Calculated 35 11   BSA Calculated 2 02   O2 Saturation 98, RA     Physical Exam   Constitutional  General appearance: No acute distress, well appearing and well nourished  Ears, Nose, Mouth, and Throat  Nasal mucosa, septum, and turbinates: Normal without edema or erythema  Lips, teeth, and gums: Normal, good dentition  Oropharynx: Normal with no erythema, edema, exudate or lesions  Neck  Neck: Supple, symmetric, trachea midline, no masses  Jugular veins: Normal    Pulmonary  Auscultation of lungs: Clear to auscultation, no rales, no crackles, no wheezing  Cardiovascular  Auscultation of heart: Normal rate and rhythm, normal S1 and S2, no murmurs  Examination of extremities for edema and/or varicosities: Normal    Abdomen  Abdomen: Soft, non-tender  Lymphatic  Palpation of lymph nodes in neck: No lymphadenopathy  Musculoskeletal  Gait and station: Normal    Digits and nails: Normal without clubbing or cyanosis  Neurologic  Mental Status: Normal  Not confused, no evidence of dementia, good comprehension, good concentration  Skin  Skin and subcutaneous tissue: Limited exam shows no rash  Psychiatric  Orientation to person, place and time: Normal    Mood and affect: Normal        Health Management  Well female exam with routine gynecological exam   BREAST EXAM; every 1 year; Next Due: 50Ope7982; Overdue    Future Appointments    Date/Time Provider Specialty Site   12/20/2017 08:15 AM PHYLLIS Nicholas  Hematology Oncology CANCER CARE [facility] MEDICAL ONCOLOGY   03/21/2018 09:40 AM PHYLLIS Keys   Pulmonary Medicine [address] [facility] PULMONARY [facility]   12/28/2017 08:15 AM Martine Pulido [facility] [place] [address] [facility]     Signatures   Electronically signed by :  PHYLLIS Gudino ; Dec 14 2017  2:08PM EST                       (Author)

## 2017-12-20 ENCOUNTER — ALLSCRIPTS OFFICE VISIT (OUTPATIENT)
Dept: OTHER | Facility: OTHER | Age: 67
End: 2017-12-20

## 2017-12-21 NOTE — PROGRESS NOTES
Assessment   1  Adenocarcinoma of right lung, stage 4 (162 9) (C34 91)   2  Metastatic bone cancer (170 9) (C41 9)    Plan   Adenocarcinoma of right lung, stage 4    · (1) CBC/PLT/DIFF; Status:Active; Requested for:39Xhq9487;    Perform:[facility]; Due:93Mbu9471; Ordered; For:Adenocarcinoma of right lung, stage 4; Ordered By:Faroun, Levonia Jolly Carolan Carrel);   · (1) COMPREHENSIVE METABOLIC PANEL; Status:Active; Requested for:76Vsc5515;    Perform:[facility]; Due:11Oxf2956; Ordered; For:Adenocarcinoma of right lung, stage 4; Ordered By:Faroun, Levonia Jolly Carolan Carrel);   · * NM PET CT SKULL BASE TO MID THIGH; Status:Hold For - Scheduling; Requested    for:91Yct4880;    Perform:[facility]; Due:04Uwv6906; Ordered; For:Adenocarcinoma of right lung, stage 4; Ordered By:Faroun, Levonia Jolly Carolan Carrel); · Follow-up visit in 3 months Evaluation and Treatment  Follow-up  Status: Hold For -    Scheduling  Requested for: 43Bky8619   Ordered; For: Adenocarcinoma of right lung, stage 4; Ordered By: Brendon Guerrero) Performed:  Due: 37ABL8935    Discussion/Summary   Discussion Summary:    History of stage IV adenocarcinoma of the lung with left scapula involvement, biopsy showed adenocarcinoma consistent with lung primary, negative for EGFR mutation, Ross 1 mutation, alk gene rearrangement, she was treated with Pembrolizumab with PDL expression of 60%, finished in May 7885 complicated with pneumonitis grade 3 resolved by corticosteroids had his left scapula progression status post radiation therapy finished in October 2017  scan in December 2017 showed residual disease in the left scapula, improvement of both lung pneumonitis/inflammation, no evidence of a new hypermetabolic lesions, I will continue watchful observation and repeat CT/ PET scan in 3-4 months with CBC, CMP  Chief Complaint   Chief Complaint: Chief Complaint:      The patient presents to the office today with Follow-up          Chief Complaint Free Text Note Form: Right lower lobe lung mass, subcarinal lymphadenopathy, T10 vertebral body involvement biopsy showed adenocarcinoma      History of Present Illness   HPI: 59-year-old  female who was admitted to the hospital with arrhythmia, was found to have right lower lobe infiltrate in August 2016, treated with antibiotics however repeat chest x-ray showed persistent right lower lobe infiltrate, subsequently the patient had a CT scan of the chest showed a right perihilar mass, subcarinal lymphadenopathy, lytic lesion of the right costovertebral junction at T 10 level, scan on October 2016 showed a right perihilar mass measuring 3 5 cm with SUV of 8 9, subcarinal lymph nodes measuring 3 4 x 2 2 cm was SUV of 9 2 nodule in the left upper lobe lung measuring 2 x 1 1 cm, lytic lesion involving the right 10th costovertebral junction with SUV of 14 4 of T10 showed non-small cell carcinoma with features suggesting of adenocarcinoma positive for CK 7, CK 19, CA-19-9, ANEUDY-3, partially positive for P40, p63, negative for TTF-1 had a history of uterine cancer in 2000 status post hysterectomy did not require radiation or chemotherapy status post bilateral nephrectomy, right knee replacement, but a cystectomy, tonsillectomy used to smoke for 35 years 1 pack per day however quit smoking 21 years ago used hormonal replacement therapy for 3 years history significant for skin cancer in her father and coronary artery disease in mother has 2 healthy children had any skin cancer before last mammogram was on June 2015, She is up-to-date with the colonoscopy an OB/GYN exam    Previous Therapy:    Pembrolizumab 200 mg IV flat dose every 3 weeks initiated in December 2016, Finished in May 2017 secondary to grade 3 pneumonitis radiation therapy to the left scapula in October 2017    Interval History: Status post bronchoscopy, no evidence of malignancy most likely pneumonitis induced by Pembrolizumab    Tumor Markers: PDL-1 by IHC of 60% for EGFR mutation, ALK gene rearrangement, ROS-1      Review of Systems   Complete-Female:      Constitutional: no fever,-- not feeling poorly,-- no recent weight gain,-- no chills,-- not feeling tired-- and-- no recent weight loss  Eyes: no purulent discharge from the eyes  ENT: no earache,-- no sore throat,-- no hearing loss-- and-- no hoarseness  Cardiovascular: no chest pain-- and-- no palpitations  Respiratory: no shortness of breath,-- no cough,-- no wheezing,-- no shortness of breath during exertion-- and-- no PND  Gastrointestinal: No complaints of abdominal pain, no constipation, no nausea or vomiting, no diarrhea, no bloody stools,-- no vomiting,-- no diarrhea-- and-- no blood in stools  Genitourinary: no dysuria,-- no pelvic pain,-- no vaginal discharge,-- no dysmenorrhea-- and-- no unexplained vaginal bleeding  Musculoskeletal: mid back pain, but-- No complaints of arthralgias, no myalgias, no joint swelling or stiffness, no limb pain or swelling  Integumentary: No complaints of skin rash or lesions, no itching, no skin wounds, no breast pain or lump  Neurological: No complaints of headache, no confusion, no convulsions, no numbness, no dizziness or fainting, no tingling, no limb weakness, no difficulty walking  Psychiatric: Not suicidal, no sleep disturbance, no anxiety or depression, no change in personality, no emotional problems  Endocrine: No complaints of proptosis, no hot flashes, no muscle weakness, no deepening of the voice, no feelings of weakness  Hematologic/Lymphatic: No complaints of swollen glands, no swollen glands in the neck, does not bleed easily, does not bruise easily  Active Problems   1  Abnormal chest CT (793 2) (R93 8)   2  Acid reflux (530 81) (K21 9)   3  Acute bronchitis due to other specified organisms (466 0) (J20 8)   4  Adenocarcinoma of right lung, stage 4 (162 9) (C34 91)   5  Anxiety (300 00) (F41 9)   6  Atrial fibrillation (427 31) (I48 91)   7  Benign essential hypertension (401 1) (I10)   8  Cancer related pain (338 3) (G89 3)   9  Chronic diastolic congestive heart failure (428 32,428 0) (I50 32)   10  Chronic left shoulder pain (719 41,338 29) (M25 512,G89 29)   11  Colon cancer screening (V76 51) (Z12 11)   12  Encounter for eye exam (V72 0) (Z01 00)   13  Encounter for screening mammogram for breast cancer (V76 12) (Z12 31)   14  Hospital discharge follow-up (V67 59) (Z09)   15  Hyperlipidemia (272 4) (E78 5)   16  Hypoxemia (799 02) (R09 02)   17  Insomnia (780 52) (G47 00)   18  Mammogram abnormal (793 80) (R92 8)   19  Metastatic bone cancer (170 9) (C41 9)   20  Mild depression (311) (F32 0)   21  Need for prophylactic vaccination against Streptococcus pneumoniae (pneumococcus)      (V03 82) (Z23)   22  Need for prophylactic vaccination and inoculation against influenza (V04 81) (Z23)   23  Osteoporosis screening (V82 81) (Z13 820)   24  Pain in joint of left shoulder (719 41) (M25 512)   25  Screening for colon cancer (V76 51) (Z12 11)   26  Screening for glaucoma (V80 1) (Z13 5)   27  Screening for other and unspecified genitourinary condition (V81 6) (Z13 89)   28  Special screening for other neurological conditions (V80 09) (Z13 89)   29  Vitamin D deficiency (268 9) (E55 9)   30  Well female exam with routine gynecological exam (V72 31) (Z01 419)    Past Medical History   1  History of Chemical pneumonitis (506 0,E980 9) (J68 0)   2  History of Community acquired pneumonia (5) (J18 9)   3  History of Diabetes mellitus type 2, uncomplicated (760 87) (S60 1)   4  History of arthritis (V13 4) (Z87 39)   5  History of cancer of uterus (V10 42) (Z85 42)   6  History of hypertension (V12 59) (Z86 79)   7  History of radiation therapy (V15 3) (Z92 3)   8  History of Malignant neoplasm without specification of site (199 1) (C80 1)   9  History of Racing heart beat (785 0) (R00 0)   10   History of Skin cancer, basal cell (173 91) (C44 91)   11  History of Upper respiratory infection with cough and congestion (465 9) (J06 9)    Surgical History   1  History of Appendectomy   2  History of Bronchoscopy (Therap) W/ EBUS Guid Transendoscopic, For Periph Lesions   3  History of Flexible Fiberoptic Laryngoscopy (Therapeutic)   4  History of Gallbladder Surgery   5  History of Knee Replacement   6  History of Mohs Micrographic Surgery Face   7  History of Tonsillectomy With Adenoidectomy   8  History of Total Abdominal Hysterectomy    Family History   Mother    1  Family history of    2  Family history of cerebrovascular accident (V17 1) (Z82 3)   3  Family history of dementia (V17 2) (Z81 8)   4  Family history of diabetes mellitus (V18 0) (Z83 3)   5  Family history of hypertension (V17 49) (Z82 49)   6  Family history of thyroid disease (V18 19) (Z83 49)  Father    9  Family history of arthritis (V17 7) (Z82 61)   8  Family history of cardiac disorder (V17 49) (Z82 49)   9  Family history of cerebrovascular accident (V17 1) (Z82 3)   10  Family history of hypertension (V17 49) (Z82 49)  Maternal Grandfather    11  Family history of Cancer of unknown origin  Paternal Uncle    15  Family history of lung cancer (V16 1) (Z80 1)  Family History    13  Family history of Cancer   14  Family history of Depression   15  Family history of Diabetes Mellitus (V18 0)   16  Family history of Essential Hypercholesterolemia   17  Family history of Heart Disease (V17 49)   18  Family history of Hypertension (V17 49)   19  Family history of Stroke Syndrome (V17 1)    Social History    · Daily caffeinated coffee consumption   · Former smoker (F01 21) (D16 375)   · Lives with family   · Never Drank Alcohol   · No drug use   · Retired   ·     Current Meds    1  Calcium + D3 TABS; Take 1 tablet twice daily; Therapy: (Recorded:2017) to Recorded   2   Eliquis 5 MG Oral Tablet; take one tablet by mouth twice daily; Therapy: 64VJC9969 to [phone])  Requested for: 03SQO0841; Last     Rx:50Iat9581 Ordered   3  MetFORMIN HCl - 1000 MG Oral Tablet; TAKE 1 TABLET TWICE DAILY; Therapy: 68OGB5813 to ([phone])  Requested for: 35JOC0133; Last     Rx:2017 Ordered   4  Metoprolol Tartrate 25 MG Oral Tablet; TAKE 1 TABLET TWICE DAILY  Requested for:     81WFQ6223; Last HI:12DAM8094 Ordered   5  Omeprazole 20 MG Oral Capsule Delayed Release; Take 1 capsule by mouth  daily; Therapy: 26MMN8846 to (Bessy Bending)  Requested for: 44ESM0208; Last     Rx:2017 Ordered   6  Sotalol HCl - 80 MG Oral Tablet; Take 1 tablet by mouth  every 12 hours; Therapy: 25RPP9014 to (Bessy Bending)  Requested for: 17HZR0394; Last     Rx:2017 Ordered   7  Tradjenta 5 MG Oral Tablet; Take 1 tablet by mouth  daily; Therapy: 16ZSI4972 to (CUJIAEN59VPG2002)  Requested for: 2017; Last     K70GTS8517 Ordered   8  Tylenol Extra Strength 500 MG Oral Tablet; take 2 tablet twice daily; Therapy: () to Recorded   9  Venlafaxine HCl ER 37 5 MG Oral Capsule Extended Release 24 Hour; Take 1 capsule     by mouth at  bedtime; Therapy: 03GDC9128 to (Evaluate:48Wia0940)  Requested for: 39BRJ5897; Last     Rx:2017 Ordered   10  Ventolin  (90 Base) MCG/ACT Inhalation Aerosol Solution; INHALE 2 PUFFS      EVERY 4-6 HOURS AS NEEDED; Therapy: ([id number]) to Recorded   11  Vitamin D 2000 UNIT Oral Tablet; TAKE 1 TAB BID AS DIRECTED; Therapy: 11EAA0951 to (Evaluate:98Ehu7935) Recorded    Allergies   1  Amoxil TABS   2  Crestor TABS   3  Zetia TABS   4  Pravachol TABS   5  Simvastatin TABS  6  Mold    Physical Exam        Constitutional      General appearance: No acute distress, well appearing and well nourished  Eyes      Pupils and irises: Equal, round and reactive to light         Ears, Nose, Mouth, and Throat      External inspection of ears and nose: Normal        Oropharynx: Normal with no erythema, edema, exudate or lesions  Pulmonary      Auscultation of lungs: Clear to auscultation  Cardiovascular      Auscultation of heart: Normal rate and rhythm, normal S1 and S2, without murmurs  Examination of extremities for edema and/or varicosities: Normal        Abdomen      Abdomen: Non-tender, no masses  Liver and spleen: No hepatomegaly or splenomegaly  Lymphatic      Palpation of lymph nodes in neck: No lymphadenopathy  Musculoskeletal      Gait and station: Normal        Skin      Skin and subcutaneous tissue: Normal without rashes or lesions  Neurologic      Cranial nerves: Cranial nerves 2-12 intact  Sensation: No sensory loss  Psychiatric      Orientation to person, place, and time: Normal        Mood and affect: Normal            ECOG 0       Results/Data   (1) CBC/PLT/DIFF 74PHY8767 08:54AM [employer])      Test Name Result Flag Reference   WBC COUNT 5 75 Thousand/uL  4 31-10 16   RBC COUNT 4 81 Million/uL  3 81-5 12   HEMOGLOBIN 12 3 g/dL  11 5-15 4   HEMATOCRIT 38 8 %  34 8-46  1   MCV 81 fL L 82-98   MCH 25 6 pg L 26 8-34 3   MCHC 31 7 g/dL  31 4-37 4   RDW 15 2 % H 11 6-15 1   MPV 11 3 fL  8 9-12 7   PLATELET COUNT 571 Thousands/uL  149-390   nRBC AUTOMATED 0 /100 WBCs     NEUTROPHILS RELATIVE PERCENT 74 %  43-75   LYMPHOCYTES RELATIVE PERCENT 13 % L 14-44   MONOCYTES RELATIVE PERCENT 9 %  4-12   EOSINOPHILS RELATIVE PERCENT 3 %  0-6   BASOPHILS RELATIVE PERCENT 1 %  0-1   NEUTROPHILS ABSOLUTE COUNT 4 33 Thousands/? ??L  1 85-7 62   LYMPHOCYTES ABSOLUTE COUNT 0 72 Thousands/? ??L  0 60-4 47   MONOCYTES ABSOLUTE COUNT 0 51 Thousand/? ??L  0 17-1 22   EOSINOPHILS ABSOLUTE COUNT 0 15 Thousand/? ??L  0 00-0 61   BASOPHILS ABSOLUTE COUNT 0 03 Thousands/? ??L  0 00-0 10      (1) COMPREHENSIVE METABOLIC PANEL 67VPQ5790 36:11WF [employer])      Test Name Result Flag Reference   SODIUM 135 mmol/L L 136-145   POTASSIUM 4 3 mmol/L  3 5-5 3   Slightly Hemolyzed; Results May be Affected   CHLORIDE 103 mmol/L  100-108   CARBON DIOXIDE 26 mmol/L  21-32   ANION GAP (CALC) 6 mmol/L  4-13   BLOOD UREA NITROGEN 19 mg/dL  5-25   CREATININE 0 81 mg/dL  0 60-1 30   Standardized to IDMS reference method   CALCIUM 9 2 mg/dL  8 3-10 1   BILI, TOTAL 0 58 mg/dL  0 20-1 00   ALK PHOSPHATAS 76 U/L     ALT (SGPT) 24 U/L  12-78   Specimen collection should occur prior to Sulfasalazine and/or Sulfapyridine administration due to the potential for falsely depressed results  AST(SGOT) 30 U/L  5-45   Slightly Hemolyzed; Results May be Affected     Specimen collection should occur prior to Sulfasalazine administration due to the potential for falsely depressed results  ALBUMIN 3 2 g/dL L 3 5-5 0   TOTAL PROTEIN 6 9 g/dL  6 4-8 2   eGFR 74 ml/min/1 73sq [facility] Disease Education Program recommendations are as follows:     GFR calculation is accurate only with a steady state creatinine     Chronic Kidney disease less than 60 ml/min/1 73 sq  meters     Kidney failure less than 15 ml/min/1 73 sq  meters  GLUCOSE FASTING 132 mg/dL H 65-99   Specimen collection should occur prior to Sulfasalazine administration due to the potential for falsely depressed results  Specimen collection should occur prior to Sulfapyridine administration due to the potential for falsely elevated results  (1) HEMOGLOBIN A1C 65NKN0508 08:54AM [facility] Order Number: [id number]      Test Name Result Flag Reference   HEMOGLOBIN A1C 7 1 % H 4 2-6 3   EST  AVG  GLUCOSE 157 mg/dl        * NM PET CT SKULL BASE TO MID THIGH 13VGR0359 08:47AM [facility] Kit Reusing)    Order Number: [id number]       - Patient Instructions: To schedule this appointment, please contact Central Scheduling at [phone]        Test Name Result Flag Reference   NM PET CT SKULL BASE TO MID THIGH (Report)     PET/CT SCAN INDICATION: C41 9: 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, unspecified  History taken directly from the electronic ordering system  Metastatic lung cancer, restaging post radiation for treatment management, history of endometrial cancer,       status post RICHARD/BSO 2000 and           MODIFIER:   PS            COMPARISON: PET CT 8/9/2017 and priors           CELL TYPE: Non-small cell carcinoma, adenocarcinoma, T10 bone biopsy 10/18/2016           TECHNIQUE:  8 7 mCi F-18-FDG administered IV  Multiplanar attenuation corrected and non attenuation corrected PET images are available for interpretation, and contiguous, low dose, axial CT sections were obtained from the skull base through the femurs       following the administration of oral contrast material (7 5 cc Omnipaque-240 in 300 cc water)  Intravenous contrast material was not utilized  This examination, like all CT scans performed in the [facility], was performed utilizing       techniques to minimize radiation dose exposure, including the use of iterative reconstruction and automated exposure control  Fasting serum glucose: 107 mg/dl           FINDINGS:            VISUALIZED BRAIN:        No acute abnormalities are seen  HEAD/NECK:        There is a physiologic distribution of FDG  No FDG avid cervical adenopathy is seen  CT images: Stable  CHEST:        Majority of previously seen hypermetabolic bilateral lung infiltrates have improved  There is a small residual focus of activity in the left upper lung measuring 2 2 x 1 1 cm, SUV 3 2  Additional bilateral lung groundglass opacities are present, with       minimal FDG activity, SUV ranging up to 2 2  There is new hypermetabolic consolidation in the left upper posterior lung along the pleura, SUV 4, likely postradiation inflammatory changes  Stable shotty mediastinal nodes  No new hypermetabolic hilar or mediastinal adenopathy  CT images: Coronary artery calcifications  ABDOMEN:        Bowel activity is likely physiologic  No new worrisome FDG avid soft tissue lesions are seen  CT images: Cholecystectomy  PELVIS:       No FDG avid soft tissue lesions are seen  CT images: Stable  OSSEOUS STRUCTURES:      Significantly decreased hypermetabolism of left scapular metastatic lytic lesion, SUV 5, prior SUV 17, compatible with response to therapy  Viable tumor however likely remains in a small medial focus  No new FDG avid lesions are seen  CT images: Otherwise stable  IMPRESSION:      1  Significantly decreased hypermetabolism of left scapular metastatic lesion, compatible with response to therapy  Viable tumor however likely remains in a small medial focus  2  New hypermetabolic consolidation in the left upper posterior lateral lung along the pleura, likely posttreatment inflammatory changes  3  Additional bilateral hypermetabolic lung infiltrates have improved  Residual mildly hypermetabolic lung densities remain bilaterally  4  Otherwise no new hypermetabolic metastases demonstrated  Workstation performed: [id number]           Signed by:      Livier Kraus MD      12/8/17      Health Management   Well female exam with routine gynecological exam   BREAST EXAM; every 1 year; Next Due: 04Vvx4499; Overdue    Future Appointments      Date/Time Provider Specialty Site   03/21/2018 09:40 AM PHYLLIS Mendieta  Pulmonary Medicine [address] [facility] PULMONARY [facility]   12/28/2017 08:15 AM Natali Roldan DO Family Medicine [address] [facility]     Signatures    Electronically signed by :  PHYLLIS Jiménez ; Dec 20 2017  8:37AM EST                       (Author)

## 2017-12-28 ENCOUNTER — ALLSCRIPTS OFFICE VISIT (OUTPATIENT)
Dept: OTHER | Facility: OTHER | Age: 67
End: 2017-12-28

## 2018-01-09 NOTE — MISCELLANEOUS
Message  Dr Phill Pate reviewed results of molecular markers  PD-L1 exp 60%--will approve Geovanny Willingham  EFFR expression pending, will be resulted Monday   if positive will refer to Clinical trial  patient aware of status      Active Problems    1  Abnormal CXR (793 2) (R93 8)   2  Acid reflux (530 81) (K21 9)   3  Adenocarcinoma of right lung, stage 4 (162 9) (C34 91)   4  Anxiety (300 00) (F41 9)   5  Atrial fibrillation (427 31) (I48 91)   6  Benign essential hypertension (401 1) (I10)   7  Cancer related pain (338 3) (G89 3)   8  Chronic diastolic congestive heart failure (428 32,428 0) (I50 32)   9  Colon cancer screening (V76 51) (Z12 11)   10  Depression (311) (F32 9)   11  Diabetes mellitus type 2, uncomplicated (098 47) (E54 6)   12  Diarrhea (787 91) (R19 7)   13  Encounter for screening mammogram for breast cancer (V76 12) (Z12 31)   14  Hospital discharge follow-up (V67 59) (Z09)   15  Hyperlipidemia (272 4) (E78 5)   16  Insomnia (780 52) (G47 00)   17  Lung mass (786 6) (R91 8)   18  Mammogram abnormal (793 80) (R92 8)   19  Metastatic bone cancer (170 9) (C41 9)   20  Nausea (787 02) (R11 0)   21  Need for prophylactic vaccination against Streptococcus pneumoniae (pneumococcus)    (V03 82) (Z23)   22  Need for prophylactic vaccination and inoculation against influenza (V04 81) (Z23)   23  Osteoporosis screening (V82 81) (Z13 820)   24  Pain in joint of left shoulder (719 41) (M25 512)   25  Palpitations (785 1) (R00 2)   26  Rib lesion (733 90) (M89 9)   27  Rib pain (786 50) (R07 81)   28  Screening for glaucoma (V80 1) (Z13 5)   29  Screening for other and unspecified genitourinary condition (V81 6) (Z13 89)   30  Situational anxiety (300 09) (F41 8)   31  Special screening for other neurological conditions (V80 09) (Z13 89)   32  Vitamin D deficiency (268 9) (E55 9)   33  Well female exam with routine gynecological exam (V72 31) (Z01 419)    Current Meds   1   ALPRAZolam 0 25 MG Oral Tablet; 1 tab PO TID prn anxiety; Therapy: 40XOK8358 to (Evaluate:28Dec2016); Last Rx:28Nov2016 Ordered   2  BuPROPion HCl - 75 MG Oral Tablet; Take 1 tablet daily; Therapy: 81ESM6350 to (Evaluate:12Dec2016)  Requested for: 00KVJ7081; Last   Rx:28Nov2016 Ordered   3  Eliquis 5 MG Oral Tablet; TAKE  1  TABLET Every twelve hours; Therapy: 72Fku7193 to (Evaluate:14Mar2017); Last Rx:14Nov2016 Ordered   4  Furosemide 40 MG Oral Tablet; TAKE 1 TABLET DAILY; Therapy: (Recorded:25Nov2016) to Recorded   5  Klor-Con M20 20 MEQ Oral Tablet Extended Release Recorded   6  MetFORMIN HCl - 1000 MG Oral Tablet; TAKE 1 TABLET TWICE DAILY; Therapy: 51APR7988 to (Evaluate:16Jan2017)  Requested for: 24HDD4667; Last   Rx:24Jan2016 Ordered   7  Metoprolol Tartrate 25 MG Oral Tablet; TAKE 1 TABLET TWICE DAILY; Therapy: (Recorded:25Nov2016) to Recorded   8  MiraLax Oral Packet (Polyethylene Glycol 3350); MIX 1 PACKET IN 8 OUNCES OF   LIQUID AND DRINK ONCE DAILY; Therapy: (Recorded:25Nov2016) to Recorded   9  Omeprazole 20 MG Oral Tablet Delayed Release; Take 1 tablet daily; Therapy: (Recorded:20Oct2016) to Recorded   10  Ondansetron 4 MG Oral Tablet Dispersible; TAKE 1 TABLET Every 4 hours PRN nausea; Therapy: (Recorded:25Nov2016) to Recorded   11  OxyCODONE HCl - 20 MG Oral Tablet; TAKE 1 TABLET Every 4 hours PRN pain; Last    Rx:28Nov2016 Ordered   12  Pioglitazone HCl - 15 MG Oral Tablet; Take 1 tablet daily; Therapy: 87LRW6947 to (Paul Flatness)  Requested for: 39FUY9133; Last    Rx:11Oct2016 Ordered   13  Sotalol HCl - 80 MG Oral Tablet; TAKE 1 TABLET EVERY 12 HOURS DAILY; Therapy: (Recorded:21Nov2016) to Recorded   14  Venlafaxine HCl ER 37 5 MG Oral Capsule Extended Release 24 Hour; TAKE 1    CAPSULE at Bedtime FOR TWO WEEKS, THEN TAKE 2 CAPSULES AT BEDTIME FOR    TWO WEEKS; Therapy: 12OFG5351 to (Salbador Zamudio)  Requested for: 73OLD2501; Last    Rx:28Nov2016 Ordered   15   Vitamin D3 1000 UNIT Oral Tablet; TAKE 1 TABLET DAILY; Therapy: (Recorded:26Oct2016) to Recorded    Allergies    1  Amoxil TABS   2  Crestor TABS   3  Zetia TABS   4  Pravachol TABS   5  Simvastatin TABS    6   Mold    Signatures   Electronically signed by : Reed Whitt, ; Dec  9 2016 11:50AM EST                       (Author)

## 2018-01-09 NOTE — MISCELLANEOUS
Message   Recorded as Task   Date: 12/05/2016 01:55 PM, Created By: Kavitha Manuel   Task Name: Call Back   Assigned To: Moon Castillo   Regarding Patient: Cy Real, Status: In Progress   Comment:    Kimberley Mora - 05 Dec 2016 1:55 PM     TASK CREATED  Caller: Self; Other; [phone] (Home); [phone]  (Work)  PT [address] 2 WEEKS  Jakob #-WON'T BE HOME FROM 3-4 TODAY  Moon Castillo - 05 Dec 2016 2:02 PM     TASK REASSIGNED: Previously Assigned To Ella Coburn  can you check on molecular markers from gen Johana Bush - 05 Dec 2016 2:55 PM     TASK REPLIED TO: Previously Assigned To Margaret Sloan  called pathology  lmom  Margaret Sloan - 05 Dec 2016 3:57 PM     TASK IN PROGRESS   Margaret Sloan - 06 Dec 2016 9:13 AM     TASK REPLIED TO: Previously Assigned To Margaret Sloan  called pathology who stated the results are not back as of yet   Moon Castillo - 06 Dec 2016 9:16 AM     TASK EDITED  spoke with patient  informed of status of biopsy        Active Problems    1  Abnormal CXR (793 2) (R93 8)   2  Acid reflux (530 81) (K21 9)   3  Adenocarcinoma of right lung, stage 4 (162 9) (C34 91)   4  Anxiety (300 00) (F41 9)   5  Atrial fibrillation (427 31) (I48 91)   6  Benign essential hypertension (401 1) (I10)   7  Cancer related pain (338 3) (G89 3)   8  Chronic diastolic congestive heart failure (428 32,428 0) (I50 32)   9  Colon cancer screening (V76 51) (Z12 11)   10  Depression (311) (F32 9)   11  Diabetes mellitus type 2, uncomplicated (438 75) (I72 7)   12  Diarrhea (787 91) (R19 7)   13  Encounter for screening mammogram for breast cancer (V76 12) (Z12 31)   14  Hospital discharge follow-up (V67 59) (Z09)   15  Hyperlipidemia (272 4) (E78 5)   16  Insomnia (780 52) (G47 00)   17  Lung mass (786 6) (R91 8)   18  Mammogram abnormal (793 80) (R92 8)   19  Metastatic bone cancer (170 9) (C41 9)   20  Nausea (787 02) (R11 0)   21   Need for prophylactic vaccination against Streptococcus pneumoniae (pneumococcus)    (V03 82) (Z23)   22  Need for prophylactic vaccination and inoculation against influenza (V04 81) (Z23)   23  Osteoporosis screening (V82 81) (Z13 820)   24  Pain in joint of left shoulder (719 41) (M25 512)   25  Palpitations (785 1) (R00 2)   26  Rib lesion (733 90) (M89 9)   27  Rib pain (786 50) (R07 81)   28  Screening for glaucoma (V80 1) (Z13 5)   29  Screening for other and unspecified genitourinary condition (V81 6) (Z13 89)   30  Situational anxiety (300 09) (F41 8)   31  Special screening for other neurological conditions (V80 09) (Z13 89)   32  Vitamin D deficiency (268 9) (E55 9)   33  Well female exam with routine gynecological exam (V72 31) (Z01 419)    Current Meds   1  ALPRAZolam 0 25 MG Oral Tablet; 1 tab PO TID prn anxiety; Therapy: 91FCM0887 to (Evaluate:83Gtg7994); Last Rx:28Nov2016 Ordered   2  BuPROPion HCl - 75 MG Oral Tablet; Take 1 tablet daily; Therapy: 62UMA4435 to (Evaluate:48Say1324)  Requested for: 70XMU0383; Last   Rx:28Nov2016 Ordered   3  Eliquis 5 MG Oral Tablet; TAKE  1  TABLET Every twelve hours; Therapy: 83Jrl9128 to (Evaluate:14Mar2017); Last Rx:14Nov2016 Ordered   4  Furosemide 40 MG Oral Tablet; TAKE 1 TABLET DAILY; Therapy: (Recorded:25Nov2016) to Recorded   5  Klor-Con M20 20 MEQ Oral Tablet Extended Release Recorded   6  MetFORMIN HCl - 1000 MG Oral Tablet; TAKE 1 TABLET TWICE DAILY; Therapy: 16LXU8245 to (Evaluate:16Jan2017)  Requested for: 71GOE8730; Last   Rx:24Jan2016 Ordered   7  Metoprolol Tartrate 25 MG Oral Tablet; TAKE 1 TABLET TWICE DAILY; Therapy: (Recorded:25Nov2016) to Recorded   8  MiraLax Oral Packet (Polyethylene Glycol 3350); MIX 1 PACKET IN 8 OUNCES OF   LIQUID AND DRINK ONCE DAILY; Therapy: (Recorded:25Nov2016) to Recorded   9  Omeprazole 20 MG Oral Tablet Delayed Release; Take 1 tablet daily; Therapy: (Recorded:20Oct2016) to Recorded   10   Ondansetron 4 MG Oral Tablet Dispersible; TAKE 1 TABLET Every 4 hours PRN nausea; Therapy: (Recorded:25Nov2016) to Recorded   11  OxyCODONE HCl - 20 MG Oral Tablet; TAKE 1 TABLET Every 4 hours PRN pain; Last    Rx:28Nov2016 Ordered   12  Pioglitazone HCl - 15 MG Oral Tablet; Take 1 tablet daily; Therapy: 60JTS3955 to ([phone])  Requested for: 18KIE0543; Last    Rx:11Oct2016 Ordered   13  Sotalol HCl - 80 MG Oral Tablet; TAKE 1 TABLET EVERY 12 HOURS DAILY; Therapy: (Recorded:21Nov2016) to Recorded   14  Venlafaxine HCl ER 37 5 MG Oral Capsule Extended Release 24 Hour; TAKE 1    CAPSULE at Bedtime FOR TWO WEEKS, THEN TAKE 2 CAPSULES AT BEDTIME FOR    TWO WEEKS; Therapy: 20IKY9536 to (Mona Johnson)  Requested for: 55BHC3172; Last    Rx:28Nov2016 Ordered   15  Vitamin D3 1000 UNIT Oral Tablet; TAKE 1 TABLET DAILY; Therapy: (Recorded:26Oct2016) to Recorded    Allergies    1  Amoxil TABS   2  Crestor TABS   3  Zetia TABS   4  Pravachol TABS   5  Simvastatin TABS    6   Mold    Signatures   Electronically signed by : Kayleigh Dumont, ; Dec  6 2016  9:16AM EST                       (Author)

## 2018-01-09 NOTE — MISCELLANEOUS
Discussion/Summary  Discussion Summary:   Phone call Blayne evening 11/13/2016 - patient had been taking three 5mg tabs of oxycodone with good relief, as discussed at previous appointment  Took two and began to have worsening pain  Called to review what she was able to take  Reviewed she could take three to four 5mg tabs (15-20mg) of oxycodone every 3 h as needed for pain  If that was not effective, she could call us back  Of note, received call from hospice nurse that patient was calling there stating that she could not get ahold of anyone from our service, however did not receive page through on-call line        Signatures   Electronically signed by : Fredrick Rubio, [facility]; Nov 14 2016 11:03PM EST                       (Author)

## 2018-01-10 NOTE — RESULT NOTES
Verified Results  MAMMO DIAGNOSTIC LEFT W CAD [id number] 09:32AM Bronson Stevens Order Number: [id number]     Test Name Result Flag Reference   MAMMO DIAGNOSTIC LEFT W CAD (Report)     Patient History:   Patient is postmenopausal and has history of endometrial cancer    at age 44  Family history of premenopausal breast cancer in paternal cousin    at age 40, breast cancer in paternal cousin at age 28, and    endometrial cancer in maternal aunt at age 72  Benign excisional biopsy of the left breast, 1979  Cyst    aspiration of the left breast  Excisional biopsy of the left    breast    Patient is a former smoker, and smoked for 30 years  Patient's    BMI is 37 2  Reason for exam: follow-up at short interval from prior study  Mammo Diagnostic Left W CAD: February 29, 2016 - Check In #:    [de-identified]   CC, MLO, and ML view(s) were taken of the left breast      Technologist: Dedra Ganser, R T (DIANA)(M)   Prior study comparison: June 23, 2015, left breast unilateral    diagnostic mammogram performed at [address] [place] [address]  June 17, 2015, digital bilateral screening mammogram,    performed at [facility]  August 2, 2011,    digital bilateral screening mammogram, performed at [facility]  There are scattered fibroglandular densities  No dominant soft tissue mass, architectural distortion or    suspicious calcifications are noted  Previously questioned    asymmetry is no longer identified  The skin and nipple contours    are within normal limits  No evidence of malignancy  No significant changes   when compared with prior studies  ASSESSMENT: BiRad:1 - Negative     Recommendation:   Return to routine screening mammogram schedule for both breasts  A reminder letter will be sent  Analyzed by CAD     8-10% of cancers will be missed on mammography  Management of a    palpable abnormality must be based on clinical grounds   Patients   will be notified of their results via letter from our facility  Accredited by [employer] of Radiology and FDA  Transcription Location: DIANA Aguilar 98: [id number]     Risk Value(s):   Tyrer-Cuzick 10 Year: 3 856%, Tyrer-Cuzick Lifetime: 7 309%,    Myriad Table: 5 6%, CHRISTIANO 5 Year: 2 4%, NCI Lifetime: 8 3%, MRS    : Based on personal and/or family history,    consideration of hereditary risk assessment may be warranted  Signed by: Sahra Cheng MD   2/29/16       Discussion/Summary   MAMMOGRAM IS NORMAL   DR Madeleine Alonzo

## 2018-01-10 NOTE — RESULT NOTES
Discussion/Summary   DIABETIC NUMBER AND CHOLESTEROL BOTH UP  WILL DISCUSS IN OFFICE AND MAKE CHANGES TO TREATMENT PLAN   DR Nicol Velazquez     Verified Results  (1) COMPREHENSIVE METABOLIC PANEL [id number] 66:76DJ Markie Rincon Order Number: [id number]     Test Name Result Flag Reference   SODIUM 144 mmol/L  136-145   POTASSIUM 4 6 mmol/L  3 5-5 3   CHLORIDE 106 mmol/L  100-108   CARBON DIOXIDE 32 mmol/L  21-32   ANION GAP (CALC) 6 mmol/L  4-13   BLOOD UREA NITROGEN 24 mg/dL  5-25   CREATININE 0 95 mg/dL  0 60-1 30   Standardized to IDMS reference method   CALCIUM 9 1 mg/dL  8 3-10 1   BILI, TOTAL 0 40 mg/dL  0 20-1 00   ALK PHOSPHATAS 88 U/L     ALT (SGPT) 29 U/L  12-78   Specimen collection should occur prior to Sulfasalazine administration due to the potential for falsely depressed results  AST(SGOT) 19 U/L  5-45   Specimen collection should occur prior to Sulfasalazine administration due to the potential for falsely depressed results  ALBUMIN 2 9 g/dL L 3 5-5 0   TOTAL PROTEIN 6 2 g/dL L 6 4-8 2   eGFR 61 ml/min/1 73sq m     National Kidney Disease Education Program recommendations are as follows:  GFR calculation is accurate only with a steady state creatinine  Chronic Kidney disease less than 60 ml/min/1 73 sq  meters  Kidney failure less than 15 ml/min/1 73 sq  meters  GLUCOSE FASTING 139 mg/dL H 65-99   Specimen collection should occur prior to Sulfasalazine administration due to the potential for falsely depressed results  Specimen collection should occur prior to Sulfapyridine administration due to the potential for falsely elevated results  (1) HEMOGLOBIN A1C [id number] 07:18AM Asaf Amaya    Order Number: [id number]     Test Name Result Flag Reference   HEMOGLOBIN A1C 8 1 % H 4 2-6 3   EST  AVG   GLUCOSE 186 mg/dl       (1) LIPID PANEL FASTING W DIRECT LDL REFLEX [id number] 07:18AM Markie Duarteus Order Number: [id number]     Test Name Result Flag Reference   CHOLESTEROL 239 mg/dL H    LDL CHOLESTEROL CALCULATED 153 mg/dL H 0-100   Triglyceride:        Normal ??? ??? ??? ??? ??? ??? ??? <150 mg/dl   ??? ??? ???Borderline High ??? ??? 150-199 mg/dl   ??? ??? ? ?? High ??? ??? ??? ??? ??? ??? ??? 200-499 mg/dl   ??? ??? ? ??Very High ??? ??? ??? ??? ??? >499 mg/dl      Cholesterol:       Desirable ??? ??? ??? ??? <200 mg/dl   ??? ??? Borderline High ??? 200-239 mg/dl   ??? ??? High ??? ??? ??? ??? ??? ??? >239 mg/dl      HDL Cholesterol:       High ??? ???>59 mg/dL   ??? ??? Low ??? ??? <41 mg/dL      HDL Cholesterol:       High ??? ???>59 mg/dL   ??? ??? Low ??? ??? <41 mg/dL      This screening LDL is a calculated result  It does not have the accuracy of the Direct Measured LDL in the monitoring of patients with hyperlipidemia and/or statin therapy  Direct Measure LDL (JQB077) must be ordered separately in these patients  TRIGLYCERIDES 134 mg/dL  <=150   Specimen collection should occur prior to N-Acetylcysteine or Metamizole administration due to the potential for falsely depressed results  HDL,DIRECT 59 mg/dL  40-60   Specimen collection should occur prior to Metamizole administration due to the potential for falsley depressed results  (1) MICROALBUMIN CREATININE RATIO, RANDOM URINE 14Aug2017 07:18AM Wade Big Sandy Order Number: [id number]     Test Name Result Flag Reference   MICROALBUMIN/ CREAT R 9 mg/g creatinine  0-30   MICROALBUMIN,URINE 15 8 mg/L  0 0-20 0   CREATININE URINE 185 0 mg/dL       (1) TSH WITH FT4 REFLEX 14Aug2017 07:18AM Wade [place] Order Number: [id number]     Test Name Result Flag Reference   TSH 2 046 uIU/mL  0 358-3 740   Patients undergoing fluorescein dye angiography may retain small amounts of fluorescein in the body for 48-72 hours post procedure  Samples containing fluorescein can produce falsely depressed TSH values   If the patient had this procedure,a specimen should be resubmitted post fluorescein clearance  The recommended reference ranges for TSH during pregnancy are as follows:  First trimester 0 1 to 2 5 uIU/mL  Second trimester  0 2 to 3 0 uIU/mL  Third trimester 0 3 to 3 0 uIU/m     (1) VITAMIN D 25-HYDROXY [id number] 07:18AM Beba Maurer    Order Number: [id number]     Test Name Result Flag Reference   VIT D 25-HYDROX 36 0 ng/mL  30 0-100 0   This assay is a certified procedure of the CDC Vitamin D Standardization Certification Program (VDSCP)     Deficiency <20ng/ml   Insufficiency 20-30ng/ml   Sufficient  ng/ml     *Patients undergoing fluorescein dye angiography may retain small amounts of fluorescein in the body for 48-72 hours post procedure  Samples containing fluorescein can produce falsely elevated Vitamin D values  If the patient had this procedure, a specimen should be resubmitted post fluorescein clearance

## 2018-01-10 NOTE — RESULT NOTES
Verified Results  (1) COMPREHENSIVE METABOLIC PANEL [id number] 66:89BU Brissa Rodriguez Order Number: [id number]     Test Name Result Flag Reference   GLUCOSE,RANDM 136 mg/dL     If the patient is fasting, the ADA then defines impaired fasting glucose as > 100 mg/dL and diabetes as > or equal to 123 mg/dL  SODIUM 135 mmol/L L 136-145   POTASSIUM 4 8 mmol/L  3 5-5 3   CHLORIDE 97 mmol/L L 100-108   CARBON DIOXIDE 32 mmol/L  21-32   ANION GAP (CALC) 6 mmol/L  4-13   BLOOD UREA NITROGEN 18 mg/dL  5-25   CREATININE 1 21 mg/dL  0 60-1 30   Standardized to IDMS reference method   CALCIUM 9 1 mg/dL  8 3-10 1   BILI, TOTAL 0 50 mg/dL  0 20-1 00   ALK PHOSPHATAS 147 U/L H    ALT (SGPT) 19 U/L  12-78   AST(SGOT) 15 U/L  5-45   ALBUMIN 2 8 g/dL L 3 5-5 0   TOTAL PROTEIN 7 3 g/dL  6 4-8 2   eGFR Non-African American 44 2 ml/min/1 73sq m     [facility] Disease Education Program recommendations are as follows:  GFR calculation is accurate only with a steady state creatinine  Chronic Kidney disease less than 60 ml/min/1 73 sq  meters  Kidney failure less than 15 ml/min/1 73 sq  meters  (1) CBC/PLT/DIFF [id number] 12:01PM Brissa Rodriguez Order Number: [id number]     Test Name Result Flag Reference   WBC COUNT 7 54 Thousand/uL  4 31-10 16   RBC COUNT 4 74 Million/uL  3 81-5 12   HEMOGLOBIN 12 6 g/dL  11 5-15 4   HEMATOCRIT 40 0 %  34 8-46  1   MCV 84 fL  82-98   MCH 26 6 pg L 26 8-34 3   MCHC 31 5 g/dL  31 4-37 4   RDW 13 6 %  11 6-15 1   MPV 10 3 fL  8 9-12 7   PLATELET COUNT 075 Thousands/uL H 149-390   NEUTROPHILS RELATIVE PERCENT 85 % H 43-75   LYMPHOCYTES RELATIVE PERCENT 6 % L 14-44   MONOCYTES RELATIVE PERCENT 8 %  4-12   EOSINOPHILS RELATIVE PERCENT 1 %  0-6   BASOPHILS RELATIVE PERCENT 0 %  0-1   NEUTROPHILS ABSOLUTE COUNT 6 36 Thousands/?L  1 85-7 62   LYMPHOCYTES ABSOLUTE COUNT 0 48 Thousands/?L L 0 60-4 47   MONOCYTES ABSOLUTE COUNT 0 62 Thousand/?L  0 17-1 22 EOSINOPHILS ABSOLUTE COUNT 0 06 Thousand/?L  0 00-0 61   BASOPHILS ABSOLUTE COUNT 0 02 Thousands/?L  0 00-0 10   - Patient Instructions: This bloodwork is non-fasting  Please drink two glasses of water morning of bloodwork  - Patient Instructions: This bloodwork is non-fasting  Please drink two glasses of water morning of bloodwork

## 2018-01-11 NOTE — PSYCH
Behavioral Health Outpatient Intake    Referred By: [facility]  Intake Questions: there are no developmental disabilities  the patient does not have a hearing impairment  the patient does not have an ICM or CTT  patient is not taking injectable psychiatric medications  Employment: The patient is not employed  Emergency Contact Information:   Emergency Contact: DENA   Relationship to Patient: BROTHER   Phone Number: [phone]   Previous Psychiatric Treatment: She has not been previously seen by a psychiatrist     She has not been previously seen by a therapist     History: no  service  She has not had combat service  Insurance Subscriber: [address] [place] [address]   Primary Insurance: MEDICARE   ID number: [id number]   Secondary Insurance: [employer]   ID number: [id number]         Presenting Problem (in patient's words): ANXIETY- PT HAS METASTATIC LUNG CA  Substance Abuse: NONE  Previous Treatment: The patient has not been seen here in the past      Accepted as Patient   Peri Pichardoo 1/11/17     Primary Care Physician: DR Michael Blancas       Signatures   Electronically signed by : Kevon Wolfe, ; Dec  2 2016 10:57AM EST                       (Author)

## 2018-01-11 NOTE — RESULT NOTES
Verified Results  (1) TISSUE EXAM [id number] 12:56PM Lianne Grant     Test Name Result Flag Reference   LAB AP CASE REPORT (Report)     Surgical Pathology Report             Case: [id number]                   Authorizing Provider: Freddy Morocho MD     Collected:      05/24/2017 1256        Ordering Location:   [address] [place] [address]   Received:      05/24/2017 [address] [facility]                               Pathologist:      Marija Hurtado MD                              Specimen:  Lung, Left Lower Lobe, Transbronchial lung bx   LAB AP FINAL DIAGNOSIS (Report)     A  Lung, left lower lobe, transbronchial biopsy:   - Minute portion of peribronchial and alevolate lung tissue with mild   chronic inflammation and     intra-alveolar macrophages  - Detached portions of unremarkable bronchial epithelium     - No malignancy identified  Electronically signed by Marija Hurtado MD on 5/25/2017 at 3:27 PM   LAB AP SURGICAL ADDITIONAL INFORMATION (Report)     These tests were developed and their performance characteristics   determined by [facility] or [facility]  They may not be cleared or approved by the [place] S  Food and   Drug Administration  The FDA has determined that such clearance or   approval is not necessary  These tests are used for clinical purposes  They should not be regarded as investigational or for research  This   laboratory has been approved by CLIA 88, designated as a high-complexity   laboratory and is qualified to perform these tests  - Interpretation performed at [facility] [employer]   LAB AP GROSS DESCRIPTION (Report)     A  The specimen is received in formalin, labeled with the patient's name   and hospital number, and is designated transbronchial lung biopsy, are   multiple irregularly shaped fragments of tan soft tissue measuring 0 5 x   0 3 x 0 1 cm in aggregate  Entirely submitted   One cassette  Note: The estimated total formalin fixation time based upon information   provided by the submitting clinician and the standard processing schedule   is 13 5 hours      RLR   LAB AP CLINICAL INFORMATION      Pt on KEYTRUDA for lung CA; NEW CONSOLIDATION b/l INFILTRATES ON ct scan   Pt on KEYTRUDA for lung CA; NEW CONSOLIDATION b/l INFILTRATES ON ct scan     (1) BRONCHIAL CULTURE [id number] 12:54PM [facility]     Test Name Result Flag Reference   CLINICAL REPORT (Report)     Test:        Bronchial culture and Gram stain  Specimen Source:  Lung, Left Lower Lobe Bronchial Lavage  Specimen Type:   Bronchial  Specimen Date:   5/24/2017 12:54 PM  Result Date:    5/27/2017 8:30 AM  Result Status:   Final result  Resulting Lab:   [facility] [address] [place] [address]            Tel: [phone]      CULTURE                                       ------------------                                   2+ Growth of Mixed Respiratory Nicole      STAIN                                        ------------------                                   No Polys or Bacteria seen     (1) CD/CD8 Bronchial Only 49VFC3060 12:54PM [facility]     Test Name Result Flag Reference   SCAN RESULT      SEE WRITTEN REPORT [address]     (1) BRONCHIAL CULTURE [id number] 12:52PM [facility]     Test Name Result Flag Reference   CLINICAL REPORT (Report)     Test:        Bronchial culture and Gram stain  Specimen Source:  Lung, Right Middle Lobe Bronchial Lavage  Specimen Type:   Bronchial  Specimen Date:   5/24/2017 12:52 PM  Result Date:    5/29/2017 2:38 PM  Result Status:   Final result  Resulting Lab:    [facility]            Tel: [phone]      CULTURE                                       ------------------                                   1+ Growth of Mixed Respiratory Nicole      STAIN ------------------                                   No Polys or Bacteria seen     (1) CULTURE, VIRUS COMPREHENSIVE [id number] 12:52PM [facility]     Test Name Result Flag Reference   CLINICAL REPORT (Report)     Test:        Virus culture  Specimen Source:  Lung, Right Middle Lobe Bronchial Lavage  Specimen Type:   Bronchial  Specimen Date:   5/24/2017 12:52 PM  Result Date:    6/1/2017 2:10 PM  Result Status:   Final result  Resulting Lab:    [facility]             [address] [address]      CULTURE                                       ------------------                                   No virus isolated  *** Performed at: [address] [place] [address] [address] [facility], [place], [place]      [id number] Director: Blayne Vallecillo MD, Phone: [phone] ***   Performed at:  [address], [place], [place]  [id number]  : Blayne Vallecillo MD, Phone:  [phone]     (1) NON-GYNECOLOGIC CYTOLOGY [id number] 12:52PM Tiff[facility] Verma     Test Name Result Flag Reference   LAB AP CASE REPORT (Report)     Non-gynecologic Cytology             Case: [id number]                  Authorizing Provider: Hai Brian MD     Collected:      05/24/2017 1252        Ordering Location:   [address] [place] [address]   Received:      05/25/2017 [place] [address] Endoscopy                               Pathologist:      Mickie Sy MD                                Specimens:  A) - Lung, Right Middle Lobe Bronchial Lavage                             B) - Lung, Left Lower Lobe Bronchial Lavage                              C) - Lung, Right Middle Lobe Bronchial Lavage, cell block                       D) - Lung, Left Lower Lobe Bronchial Lavage, cell block   LAB AP CYTO FINAL DIAGNOSIS (Report)     A,C  Lung, Right Middle Lobe Bronchial Lavage (ThinPrep and cell block   preparations):  Negative for malignancy    Benign bronchial cells and many macrophages  Satisfactory for evaluation     B,D  Lung, Left Lower Lobe Bronchial Lavage (ThinPrep and cell block   preparations): Atypical cellular changes  Benign bronchial cells, and macrophages  Rare keratinized squamous cells seen on the cell block slide    Satisfactory for evaluation  Note:  If a neoplasm is in the clinical differential diagnosis, additional tissue   sampling would be recommended for definitive diagnosis  Electronically signed by Jcae Chen MD on 5/26/2017 at 2:16 PM   LAB AP GROSS DESCRIPTION (Report)     A  Lung, Right Middle Lobe Bronchial Lavage, : 30ml  Slightly bloody,   received in CytoLyt    B  Lung, Left Lower Lobe Bronchial Lavage, : 30ml  Slightly bloody,   received in CytoLyt    C  Lung, Right Middle Lobe Bronchial Lavage, cell block: very minimal cell   button, red    D  Lung, Left Lower Lobe Bronchial Lavage, cell block: very minimal cell   button, red   LAB AP NONGYN ADDITIONAL INFORMATION (Report)     [employer]'s FDA approved ,  and ThinPrep Imaging System are   utilized with strict adherence to the 's instruction manual to   prepare gynecologic and non-gynecologic cytology specimens for the   production of ThinPrep slides as well as for gynecologic ThinPrep imaging  These processes have been validated by our laboratory and/or by the     These tests were developed and their performance characteristics   determined by [facility] [address] [place] [address] or [facility]  They may not be cleared or approved by the [place] S  Food and   Drug Administration  The FDA has determined that such clearance or   approval is not necessary  These tests are used for clinical purposes  They should not be regarded as investigational or for research  This   laboratory has been approved by CLIA 88, designated as a high-complexity   laboratory and is qualified to perform these tests

## 2018-01-11 NOTE — PROGRESS NOTES
Discussion/Summary  Social Work-Discussion Summary [facility]: Patient is being seen for a distress screenning assessment  Received and reviewed Pt's Distress Thermometer completed by Pt on 9/25/17 in the [address] [place] [address] Floor Oncology office  Pt rated her distress at a 0/10 and denied having any psychosocial problems  Based upon Pt's zero distress score and lack of psychosocial stressors, a Cancer Counselor follow up is not indicated  If Pt expresses psychosocial needs, Cancer Counselor is available to provide counseling and intervention         Signatures   Electronically signed by : Miachel Opitz, [address]; Sep 25 2017  4:13PM EST                       (Author)

## 2018-01-11 NOTE — PROGRESS NOTES
Chief Complaint  Pt is here today for a BP and EKG per Dr Bessy Ayala  Pts BP in the office today was 126/76 and her HR was 83  Pt c/o palpitations when she is laying down but states that she has been having anxiety recently because of her CT tomorrow 9/30/16  I spoke with Dr Bessy Ayala and he made no changes to her medical regimen today  Active Problems    1  Abnormal CXR (793 2) (R93 8)   2  Acid reflux (530 81) (K21 9)   3  Acute right-sided low back pain without sciatica (724 2) (M54 5)   4  Atrial fibrillation (427 31) (I48 91)   5  Benign essential hypertension (401 1) (I10)   6  Colon cancer screening (V76 51) (Z12 11)   7  Community acquired pneumonia (5) (J18 9)   8  Depression (311) (F32 9)   9  Diabetes mellitus type 2, uncomplicated (145 19) (W38 9)   10  Encounter for screening mammogram for breast cancer (V76 12) (Z12 31)   11  Hospital discharge follow-up (V67 59) (Z09)   12  Hyperlipidemia (272 4) (E78 5)   13  Mammogram abnormal (793 80) (R92 8)   14  Need for prophylactic vaccination against Streptococcus pneumoniae (pneumococcus)    (V03 82) (Z23)   15  Osteoporosis screening (V82 81) (Z13 820)   16  Pain in joint of left shoulder (719 41) (M25 512)   17  Palpitations (785 1) (R00 2)   18  Screening for glaucoma (V80 1) (Z13 5)   19  Screening for other and unspecified genitourinary condition (V81 6) (Z13 89)   20  Situational anxiety (300 09) (F41 8)   21  Special screening for other neurological conditions (V80 09) (Z13 89)   22  Vitamin D deficiency (268 9) (E55 9)   23  Well female exam with routine gynecological exam (V72 31) (Z01 419)    Current Meds   1  Benazepril-Hydrochlorothiazide 20-12 5 MG Oral Tablet; take 1 tablet every day; Therapy: 17RCQ6280 to (Evaluate:07Jan2017)  Requested for: 89GBA0197; Last   Rx:13Jan2016 Ordered   2  BuPROPion HCl ER (SR) 150 MG Oral Tablet Extended Release 12 Hour; TAKE 1   TABLET DAILY;    Therapy: 23Apr2015 to (Evaluate:16Jan2017)  Requested for: 54NTI7738; Last   C15GZW6636 Ordered   3  Bystolic 10 MG Oral Tablet; Take 1 tablet daily; Therapy: 25QEN9310 to (Evaluate:2017); Last Rx:2016 Ordered   4  Eliquis 5 MG Oral Tablet; TAKE  1  TABLET Every twelve hours; Therapy: 67Nib9300 to (Evaluate:58Rdm7438) Recorded   5  MetFORMIN HCl - 1000 MG Oral Tablet; TAKE 1 TABLET TWICE DAILY; Therapy: 80EDK7983 to (Evaluate:2017)  Requested for: 05FBS1868; Last   Rx:2016 Ordered   6  Omeprazole 20 MG Oral Tablet Delayed Release; Take 1 tablet daily; Therapy: (Recorded:22Zos5265) to Recorded    Allergies    1  Amoxil TABS   2  Crestor TABS   3  Zetia TABS   4  Pravachol TABS   5  Simvastatin TABS    6  Mold    Vitals  Signs    Systolic: 242, RUE, Sitting  Diastolic: 72, RUE, Sitting  Heart Rate: 83  Height: 5 ft 4 in    Assessment    1   Atrial fibrillation (427 31) (I48 91)    Plan  Atrial fibrillation    · EKG/ECG- POC; Status:Complete;   Done: 76XKR6750    Future Appointments    Date/Time Provider Specialty Site   01/10/2017 08:15 AM Charlene Beaver DO Family Medicine [address]     Signatures   Electronically signed by : Austin Li RN; Sep 29 2016  1:10PM EST                       (Author)    Electronically signed by : PHYLLIS Morataya ; Sep 29 2016  1:13PM EST                       (Co-author)

## 2018-01-12 VITALS
TEMPERATURE: 98.1 F | OXYGEN SATURATION: 92 % | WEIGHT: 192 LBS | BODY MASS INDEX: 31.99 KG/M2 | DIASTOLIC BLOOD PRESSURE: 68 MMHG | SYSTOLIC BLOOD PRESSURE: 136 MMHG | HEIGHT: 65 IN | HEART RATE: 62 BPM | RESPIRATION RATE: 16 BRPM

## 2018-01-12 VITALS
SYSTOLIC BLOOD PRESSURE: 128 MMHG | OXYGEN SATURATION: 94 % | TEMPERATURE: 97.4 F | HEIGHT: 65 IN | HEART RATE: 77 BPM | WEIGHT: 173 LBS | DIASTOLIC BLOOD PRESSURE: 66 MMHG | RESPIRATION RATE: 16 BRPM | BODY MASS INDEX: 28.82 KG/M2

## 2018-01-12 VITALS
HEIGHT: 64 IN | WEIGHT: 181.19 LBS | BODY MASS INDEX: 30.93 KG/M2 | DIASTOLIC BLOOD PRESSURE: 64 MMHG | HEART RATE: 59 BPM | SYSTOLIC BLOOD PRESSURE: 142 MMHG

## 2018-01-12 VITALS
HEART RATE: 72 BPM | DIASTOLIC BLOOD PRESSURE: 80 MMHG | TEMPERATURE: 98 F | RESPIRATION RATE: 16 BRPM | SYSTOLIC BLOOD PRESSURE: 140 MMHG | BODY MASS INDEX: 35.28 KG/M2 | WEIGHT: 212 LBS

## 2018-01-12 VITALS
HEIGHT: 64 IN | SYSTOLIC BLOOD PRESSURE: 110 MMHG | TEMPERATURE: 96.9 F | HEART RATE: 64 BPM | OXYGEN SATURATION: 94 % | DIASTOLIC BLOOD PRESSURE: 70 MMHG | WEIGHT: 183 LBS | RESPIRATION RATE: 18 BRPM | BODY MASS INDEX: 31.24 KG/M2

## 2018-01-12 VITALS
HEART RATE: 65 BPM | SYSTOLIC BLOOD PRESSURE: 142 MMHG | RESPIRATION RATE: 18 BRPM | HEIGHT: 65 IN | TEMPERATURE: 98.2 F | DIASTOLIC BLOOD PRESSURE: 70 MMHG | BODY MASS INDEX: 34.49 KG/M2 | WEIGHT: 207 LBS | OXYGEN SATURATION: 96 %

## 2018-01-12 NOTE — MISCELLANEOUS
Message  patient continues to have dietary issues post RT  referred to [facility]   dietician      Active Problems    1  Abnormal CXR (793 2) (R93 8)   2  Acid reflux (530 81) (K21 9)   3  Adenocarcinoma of right lung, stage 4 (162 9) (C34 91)   4  Anxiety (300 00) (F41 9)   5  Atrial fibrillation (427 31) (I48 91)   6  Benign essential hypertension (401 1) (I10)   7  Cancer related pain (338 3) (G89 3)   8  Chronic diastolic congestive heart failure (428 32,428 0) (I50 32)   9  Colon cancer screening (V76 51) (Z12 11)   10  Depression (311) (F32 9)   11  Diabetes mellitus type 2, uncomplicated (643 11) (V38 2)   12  Diarrhea (787 91) (R19 7)   13  Encounter for screening mammogram for breast cancer (V76 12) (Z12 31)   14  Hospital discharge follow-up (V67 59) (Z09)   15  Hyperlipidemia (272 4) (E78 5)   16  Insomnia (780 52) (G47 00)   17  Lung mass (786 6) (R91 8)   18  Mammogram abnormal (793 80) (R92 8)   19  Metastatic bone cancer (170 9) (C41 9)   20  Nausea (787 02) (R11 0)   21  Need for prophylactic vaccination against Streptococcus pneumoniae (pneumococcus)    (V03 82) (Z23)   22  Need for prophylactic vaccination and inoculation against influenza (V04 81) (Z23)   23  Osteoporosis screening (V82 81) (Z13 820)   24  Pain in joint of left shoulder (719 41) (M25 512)   25  Palpitations (785 1) (R00 2)   26  Rib lesion (733 90) (M89 9)   27  Rib pain (786 50) (R07 81)   28  Screening for glaucoma (V80 1) (Z13 5)   29  Screening for other and unspecified genitourinary condition (V81 6) (Z13 89)   30  Situational anxiety (300 09) (F41 8)   31  Special screening for other neurological conditions (V80 09) (Z13 89)   32  Vitamin D deficiency (268 9) (E55 9)   33  Well female exam with routine gynecological exam (V72 31) (Z01 419)    Current Meds   1  ALPRAZolam 0 25 MG Oral Tablet; 1 tab PO TID prn anxiety; Therapy: 15SKW1333 to (Evaluate:53Yga9496); Last Rx:28Nov2016 Ordered   2   Eliquis 5 MG Oral Tablet; TAKE  1  TABLET Every twelve hours; Therapy: 99Vtt6373 to (Evaluate:14Mar2017); Last Rx:14Nov2016 Ordered   3  Furosemide 40 MG Oral Tablet; TAKE 1 TABLET DAILY; Therapy: (Recorded:25Nov2016) to Recorded   4  Klor-Con M20 20 MEQ Oral Tablet Extended Release Recorded   5  MetFORMIN HCl - 1000 MG Oral Tablet; TAKE 1 TABLET TWICE DAILY; Therapy: 55RCC7684 to (Evaluate:16Jan2017)  Requested for: 24HMC5866; Last   Rx:24Jan2016 Ordered   6  Metoprolol Tartrate 25 MG Oral Tablet; TAKE 1 TABLET TWICE DAILY  Requested for:   96IFA5041; Last Rx:61Kyp0208 Ordered   7  MiraLax Oral Packet (Polyethylene Glycol 3350); MIX 1 PACKET IN 8 OUNCES OF   LIQUID AND DRINK ONCE DAILY; Therapy: (Recorded:25Nov2016) to Recorded   8  Omeprazole 20 MG Oral Tablet Delayed Release; Take 1 tablet daily; Therapy: (Recorded:20Oct2016) to Recorded   9  Ondansetron 4 MG Oral Tablet Dispersible; TAKE 1 TABLET Every 4 hours PRN nausea; Therapy: (Recorded:25Nov2016) to Recorded   10  OxyCODONE HCl - 20 MG Oral Tablet; TAKE 1 TABLET Every 4 hours PRN pain; Last    Rx:28Nov2016 Ordered   11  Pioglitazone HCl - 15 MG Oral Tablet; Take 1 tablet daily; Therapy: 38ZPI2671 to (Gael Lagunas)  Requested for: 37MGN1907; Last    Rx:11Oct2016 Ordered   12  Sotalol HCl - 80 MG Oral Tablet; TAKE 1 TABLET EVERY 12 HOURS DAILY  Requested    for: 56REO9724; Last Rx:26Lww8564 Ordered   13  Venlafaxine HCl ER 37 5 MG Oral Capsule Extended Release 24 Hour; Take 2 Capsules    at Bedtime; Therapy: 64SQC8508 to (Evaluate:60Unh7538)  Requested for: 03NYJ7022; Last    Rx:16Vqh3337 Ordered   14  Vitamin D3 1000 UNIT Oral Tablet; TAKE 1 TABLET DAILY; Therapy: (Recorded:26Oct2016) to Recorded    Allergies    1  Amoxil TABS   2  Crestor TABS   3  Zetia TABS   4  Pravachol TABS   5  Simvastatin TABS    6   Mold    Signatures   Electronically signed by : Mony Kim, ; Dec 19 2016  8:53AM EST                       (Author)

## 2018-01-12 NOTE — PROGRESS NOTES
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, and her brother who has a physical disability  Pt discussed having support caring her her family members since she has become sick  Pt has two adult children who she reports are supportive  Pt is planning on bringing her children with her to her appointment with Dr Kimberlee Singh 11/08/2016  LSW assisted pt in connecting pt to palliative care per pt's request  Pt expressed fears related to receiving palliative care  Pt reports her pcp is currently managing her pain, anxiety and insomnia  Pt reports her insomnia has improved since beginning temazepam last week  She is able to sleep 5 hours a night vs 2 hours a night before taking this medication  LSW provided pt supportive counseling during this visit  LSW scheduled follow up visi with pt next week to assess pt's coping  LSW is available to provide pt ongoing psychosocial support        Signatures   Electronically signed by : SANAZ Solares; Nov 4 2016  4:29PM EST                       (Author)

## 2018-01-13 VITALS
BODY MASS INDEX: 30.09 KG/M2 | WEIGHT: 176.25 LBS | RESPIRATION RATE: 16 BRPM | HEART RATE: 62 BPM | SYSTOLIC BLOOD PRESSURE: 124 MMHG | DIASTOLIC BLOOD PRESSURE: 80 MMHG | HEIGHT: 64 IN | OXYGEN SATURATION: 96 % | TEMPERATURE: 95.6 F

## 2018-01-13 VITALS
WEIGHT: 171 LBS | BODY MASS INDEX: 29.19 KG/M2 | OXYGEN SATURATION: 98 % | SYSTOLIC BLOOD PRESSURE: 122 MMHG | TEMPERATURE: 95.3 F | DIASTOLIC BLOOD PRESSURE: 80 MMHG | HEART RATE: 61 BPM | RESPIRATION RATE: 16 BRPM | HEIGHT: 64 IN

## 2018-01-13 VITALS
HEIGHT: 65 IN | TEMPERATURE: 98.2 F | SYSTOLIC BLOOD PRESSURE: 130 MMHG | BODY MASS INDEX: 34.38 KG/M2 | HEART RATE: 72 BPM | WEIGHT: 206.38 LBS | DIASTOLIC BLOOD PRESSURE: 70 MMHG | RESPIRATION RATE: 18 BRPM

## 2018-01-13 VITALS
BODY MASS INDEX: 35.49 KG/M2 | HEIGHT: 65 IN | HEART RATE: 79 BPM | OXYGEN SATURATION: 96 % | RESPIRATION RATE: 16 BRPM | SYSTOLIC BLOOD PRESSURE: 130 MMHG | DIASTOLIC BLOOD PRESSURE: 70 MMHG | WEIGHT: 213 LBS | TEMPERATURE: 97.9 F

## 2018-01-13 VITALS
TEMPERATURE: 98.3 F | DIASTOLIC BLOOD PRESSURE: 70 MMHG | HEIGHT: 65 IN | WEIGHT: 211 LBS | SYSTOLIC BLOOD PRESSURE: 142 MMHG | BODY MASS INDEX: 35.16 KG/M2 | OXYGEN SATURATION: 95 % | RESPIRATION RATE: 16 BRPM | HEART RATE: 70 BPM

## 2018-01-13 VITALS
OXYGEN SATURATION: 95 % | HEART RATE: 65 BPM | DIASTOLIC BLOOD PRESSURE: 78 MMHG | SYSTOLIC BLOOD PRESSURE: 131 MMHG | BODY MASS INDEX: 29.23 KG/M2 | TEMPERATURE: 97.7 F | WEIGHT: 173.31 LBS | RESPIRATION RATE: 18 BRPM

## 2018-01-13 VITALS
HEART RATE: 71 BPM | DIASTOLIC BLOOD PRESSURE: 78 MMHG | BODY MASS INDEX: 31.18 KG/M2 | WEIGHT: 187.13 LBS | HEIGHT: 65 IN | RESPIRATION RATE: 16 BRPM | OXYGEN SATURATION: 94 % | SYSTOLIC BLOOD PRESSURE: 122 MMHG

## 2018-01-13 VITALS
DIASTOLIC BLOOD PRESSURE: 60 MMHG | HEIGHT: 65 IN | BODY MASS INDEX: 30.51 KG/M2 | WEIGHT: 183.13 LBS | SYSTOLIC BLOOD PRESSURE: 130 MMHG | HEART RATE: 68 BPM | RESPIRATION RATE: 18 BRPM

## 2018-01-13 VITALS
WEIGHT: 210 LBS | HEART RATE: 71 BPM | HEIGHT: 65 IN | OXYGEN SATURATION: 97 % | SYSTOLIC BLOOD PRESSURE: 140 MMHG | BODY MASS INDEX: 34.99 KG/M2 | DIASTOLIC BLOOD PRESSURE: 60 MMHG

## 2018-01-13 NOTE — RESULT NOTES
Verified Results  (1) HEMOGLOBIN A1C 14DEC8362 09:37AM Shereen Short Order Number: [id number]     Test Name Result Flag Reference   HEMOGLOBIN A1C 7 0 % H 4 2-6 3   EST  AVG  GLUCOSE 154 mg/dl         Discussion/Summary   BETTER THEN YOU HAVE HAD FOR AWHILE      [facility]

## 2018-01-14 VITALS
DIASTOLIC BLOOD PRESSURE: 72 MMHG | SYSTOLIC BLOOD PRESSURE: 118 MMHG | OXYGEN SATURATION: 95 % | RESPIRATION RATE: 16 BRPM | TEMPERATURE: 98 F | HEIGHT: 65 IN | BODY MASS INDEX: 35.16 KG/M2 | HEART RATE: 70 BPM | WEIGHT: 211 LBS

## 2018-01-14 VITALS
RESPIRATION RATE: 18 BRPM | OXYGEN SATURATION: 95 % | HEART RATE: 65 BPM | TEMPERATURE: 98.1 F | DIASTOLIC BLOOD PRESSURE: 65 MMHG | WEIGHT: 180 LBS | SYSTOLIC BLOOD PRESSURE: 123 MMHG | BODY MASS INDEX: 30.9 KG/M2

## 2018-01-14 VITALS
HEIGHT: 65 IN | HEART RATE: 72 BPM | RESPIRATION RATE: 18 BRPM | DIASTOLIC BLOOD PRESSURE: 68 MMHG | BODY MASS INDEX: 35.72 KG/M2 | WEIGHT: 214.4 LBS | SYSTOLIC BLOOD PRESSURE: 120 MMHG | OXYGEN SATURATION: 97 %

## 2018-01-14 VITALS
TEMPERATURE: 97.5 F | HEIGHT: 64 IN | BODY MASS INDEX: 29.71 KG/M2 | RESPIRATION RATE: 16 BRPM | WEIGHT: 174 LBS | SYSTOLIC BLOOD PRESSURE: 140 MMHG | OXYGEN SATURATION: 89 % | HEART RATE: 84 BPM | DIASTOLIC BLOOD PRESSURE: 70 MMHG

## 2018-01-14 VITALS
TEMPERATURE: 96.5 F | DIASTOLIC BLOOD PRESSURE: 80 MMHG | HEART RATE: 79 BPM | WEIGHT: 179 LBS | BODY MASS INDEX: 30.56 KG/M2 | SYSTOLIC BLOOD PRESSURE: 140 MMHG | RESPIRATION RATE: 16 BRPM | HEIGHT: 64 IN | OXYGEN SATURATION: 93 %

## 2018-01-14 VITALS
HEIGHT: 65 IN | HEART RATE: 68 BPM | DIASTOLIC BLOOD PRESSURE: 62 MMHG | SYSTOLIC BLOOD PRESSURE: 110 MMHG | WEIGHT: 212 LBS | BODY MASS INDEX: 35.32 KG/M2 | RESPIRATION RATE: 18 BRPM | OXYGEN SATURATION: 96 % | TEMPERATURE: 96.2 F

## 2018-01-14 VITALS
HEIGHT: 64 IN | DIASTOLIC BLOOD PRESSURE: 80 MMHG | SYSTOLIC BLOOD PRESSURE: 110 MMHG | HEART RATE: 72 BPM | WEIGHT: 172.25 LBS | RESPIRATION RATE: 18 BRPM | BODY MASS INDEX: 29.41 KG/M2

## 2018-01-14 VITALS
HEART RATE: 106 BPM | HEIGHT: 64 IN | OXYGEN SATURATION: 94 % | BODY MASS INDEX: 30.56 KG/M2 | TEMPERATURE: 98.7 F | RESPIRATION RATE: 18 BRPM | SYSTOLIC BLOOD PRESSURE: 108 MMHG | WEIGHT: 179 LBS | DIASTOLIC BLOOD PRESSURE: 70 MMHG

## 2018-01-14 NOTE — RESULT NOTES
Message  Called patient to review bronchoscopy results with her  She did not answer however message was left for her to call the office to review results as well as plan as listed below  No malignancy is noted, however, there is inflammation seen  The case was reviewed with Dr Elizaebth Morales by phone  He wishes to increase her prednisone to 60 mg daily for 4 week and then decreased to 40 mg for 2 weeks  He will continue prednisone taper and decrease by 10 mg every 2 weeks  While on high doses of prednisone she will need to be on prophylactic Bactrim 3 times a week along with Os-Edenilson with vitamin D daily  He also would like a repeat CT of the chest along with spirogram and DLCO in approximately 8 weeks  She will follow-up with him after these tests are complete  Our office staff is aware of the above and appointment will be set up for her along with above testing  We will await her call back to review bronchoscopy results and above plan with her  Dr Lilly is also aware of above  Plan  Abnormal chest CT    · PredniSONE 10 MG Oral Tablet; 6 tabs daily for 4 wks then 4 tabs daily for 2 wks,  then 3 tabs daily for 2 wks, then 2 tabs daily for 2 wks, 1 tab daily for 2 wks   · Sulfamethoxazole-Trimethoprim 800-160 MG Oral Tablet (Bactrim DS); TAKE 1  TABLET DAILY ON MONDAY, WEDNESDAY, AND FRIDAY   · * CT CHEST WO CONTRAST; Status:Hold For - Scheduling; Requested for:17Prp5822; Abnormal chest CT, Adenocarcinoma of right lung, stage 4    · Spirometry w Diffusion Capacity Assessment; Status:Active;  Requested for:29Haa1919;     Signatures   Electronically signed by : Riley Hampton, [facility]; May 31 2017  5:04PM EST                       (Author)

## 2018-01-14 NOTE — MISCELLANEOUS
Message   Recorded as Task   Date: 01/13/2017 08:10 AM, Created By: ANABELLA BENNETT   Task Name: Call Back   Assigned To: Moon Castillo   Regarding Patient: Malaika Veronica, Status: In Progress   Comment:    Azeem,Johana - 13 Jan 2017 8:10 AM     TASK CREATED  Pt called and stated she is coughing up pink sputum  She spoke to dr gonsalves who told the pt to follow up with you about the issue  Callback#: [phone]   Moon Castillo - 13 Jan 2017 8:16 AM     TASK IN PROGRESS   Moon Castillo - 13 Jan 2017 8:21 AM     TASK EDITED  spoke with patient   coughing up 'scant " amount pink tinged sputum  patient will call if symptoms worsen        Active Problems    1  Abnormal CXR (793 2) (R93 8)   2  Acid reflux (530 81) (K21 9)   3  Adenocarcinoma of right lung, stage 4 (162 9) (C34 91)   4  Anxiety (300 00) (F41 9)   5  Atrial fibrillation (427 31) (I48 91)   6  Benign essential hypertension (401 1) (I10)   7  Cancer related pain (338 3) (G89 3)   8  Chronic diastolic congestive heart failure (428 32,428 0) (I50 32)   9  Colon cancer screening (V76 51) (Z12 11)   10  Depression (311) (F32 9)   11  Diabetes mellitus type 2, uncomplicated (356 87) (E19 9)   12  Diarrhea (787 91) (R19 7)   13  Encounter for screening mammogram for breast cancer (V76 12) (Z12 31)   14  Hospital discharge follow-up (V67 59) (Z09)   15  Hyperlipidemia (272 4) (E78 5)   16  Insomnia (780 52) (G47 00)   17  Lung mass (786 6) (R91 8)   18  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)   19  Mammogram abnormal (793 80) (R92 8)   20  Metastatic bone cancer (170 9) (C41 9)   21  Nausea (787 02) (R11 0)   22  Need for prophylactic vaccination against Streptococcus pneumoniae (pneumococcus)    (V03 82) (Z23)   23  Need for prophylactic vaccination and inoculation against influenza (V04 81) (Z23)   24  Osteoporosis screening (V82 81) (Z13 820)   25  Pain in joint of left shoulder (719 41) (M25 512)   26  Palpitations (785 1) (R00 2)   27  Rib lesion (733 90) (M89 9)   28   Rib pain (786 50) (R07 81)   29  Screening for glaucoma (V80 1) (Z13 5)   30  Screening for other and unspecified genitourinary condition (V81 6) (Z13 89)   31  Situational anxiety (300 09) (F41 8)   32  Special screening for other neurological conditions (V80 09) (Z13 89)   33  Vitamin D deficiency (268 9) (E55 9)   34  Well female exam with routine gynecological exam (V72 31) (Z01 419)    Current Meds   1  ALPRAZolam 0 25 MG Oral Tablet; 1 tab PO TID prn anxiety; Therapy: 16MGQ7850 to (Evaluate:20Jan2017); Last Rx:60Ywd9559 Ordered   2  Doxycycline Hyclate 100 MG Oral Capsule; TAKE 1 CAPSULE DAILY WITH A MEAL; Therapy: (Recorded:05Jan2017) to Recorded   3  Eliquis 5 MG Oral Tablet; TAKE  1  TABLET Every twelve hours; Therapy: 37Fju5574 to (Evaluate:14Mar2017); Last Rx:14Nov2016 Ordered   4  MetFORMIN HCl - 1000 MG Oral Tablet; TAKE 1 TABLET TWICE DAILY; Therapy: 58ZLT2041 to (654 560 824)  Requested for: 14IMP7090; Last   Rx:12Jan2017 Ordered   5  Metoprolol Tartrate 25 MG Oral Tablet; TAKE 1 TABLET TWICE DAILY  Requested for:   90EKP9343; Last Rx:95Rde6885 Ordered   6  MiraLax Oral Packet (Polyethylene Glycol 3350); MIX 1 PACKET IN 8 OUNCES OF   LIQUID AND DRINK ONCE DAILY; Therapy: (Recorded:25Nov2016) to Recorded   7  Omeprazole 20 MG Oral Tablet Delayed Release; Take 1 tablet daily  Requested for:   12Jan2017; Last Rx:12Jan2017 Ordered   8  Ondansetron 4 MG Oral Tablet Dispersible; one tab every 6 hours as needed for nausea   related to cancer  Minimum three doses per day, prior to meals  Requested for:   99WYH3921; Last Rx:10Jan2017 Ordered   9  Sotalol HCl - 80 MG Oral Tablet; TAKE 1 TABLET EVERY 12 HOURS DAILY  Requested   for: 40YKT0763; Last Rx:16Vfm5810 Ordered   10  Tradjenta 5 MG Oral Tablet; Take 1 tablet daily; Therapy: 18FSM6668 to (Evaluate:07Jan2018)  Requested for: 17XIN5959; Last    Rx:12Jan2017 Ordered   11   Venlafaxine HCl ER 75 MG Oral Capsule Extended Release 24 Hour; TAKE 1 CAPSULE    Bedtime; Therapy: 28POQ1400 to (Evaluate:20Jan2017)  Requested for: 13Els0166; Last    Rx:45Tuv9688 Ordered   12  Ventolin  (90 Base) MCG/ACT Inhalation Aerosol Solution; Inhale 2 puffs every    4-6 hours as needed for wheeze; Therapy: 60OLR4672 to (Last Rx:06Jan2017)  Requested for: 58SUN3577 Ordered    Allergies    1  Amoxil TABS   2  Crestor TABS   3  Zetia TABS   4  Pravachol TABS   5  Simvastatin TABS    6   Mold    Signatures   Electronically signed by : Wendy Landis, ; Jan 13 2017  8:21AM EST                       (Author)

## 2018-01-15 NOTE — MISCELLANEOUS
Message   Recorded as Task   Date: 01/10/2017 08:17 AM, Created By: Merlinda Byars   Task Name: Med Renewal Request   Assigned To: Leland Duran   Regarding Patient: Maria T Barrera, Status: Active   Comment:    Merlinda Byars - 10 Rosales 2017 8:17 AM     TASK CREATED  Caller: Self; [phone] (Home)  Pt is requesting a refill for Ondansetron  States it helps when she takes a half hour before meal    Refilled #150 for one month, with several refills beyond  Plan  Adenocarcinoma of right lung, stage 4, Malignant cachexia, Metastatic bone cancer    · From  Ondansetron 4 MG Oral Tablet Dispersible TAKE 1 TABLET Every 4  hours PRN nausea To Ondansetron 4 MG Oral Tablet Dispersible one tab every 6 hours  as needed for nausea related to cancer    Minimum three doses per day, prior to meals    Signatures   Electronically signed by : PHYLLIS Knott ; Rosales 10 2017  8:50AM EST                       (Author)

## 2018-01-15 NOTE — RESULT NOTES
Verified Results  (1) COMPREHENSIVE METABOLIC PANEL 24TMN5525 45:79FL Shekari, [address] [facility] Kid[facility] Disease Education Program recommendations are as follows:  GFR calculation is accurate only with a steady state creatinine  Chronic Kidney disease less than 60 ml/min/1 73 sq  meters  Kidney failure less than 15 ml/min/1 73 sq  meters  Test Name Result Flag Reference   GLUCOSE,RANDM 143 mg/dL H    If the patient is fasting, the ADA then defines impaired fasting glucose as > 100 mg/dL and diabetes as > or equal to 123 mg/dL  SODIUM 142 mmol/L  136-145   POTASSIUM 4 3 mmol/L  3 5-5 3   CHLORIDE 105 mmol/L  100-108   CARBON DIOXIDE 29 mmol/L  21-32   ANION GAP (CALC) 8 mmol/L  4-13   BLOOD UREA NITROGEN 19 mg/dL  5-25   CREATININE 0 90 mg/dL  0 60-1 30   Standardized to IDMS reference method   CALCIUM 8 6 mg/dL  8 3-10 1   BILI, TOTAL 0 50 mg/dL  0 20-1 00   ALK PHOSPHATAS 80 U/L     ALT (SGPT) 25 U/L  12-78   AST(SGOT) 21 U/L  5-45   ALBUMIN 3 3 g/dL L 3 5-5 0   TOTAL PROTEIN 6 5 g/dL  6 4-8 2   eGFR Non-African American      >60 0 ml/min/1 73sq m     (1) LIPID PANEL FASTING W DIRECT LDL REFLEX 13Kur3772 06:16AM Natali Book   Triglyceride:         Normal              <150 mg/dl       Borderline High    150-199 mg/dl       High               200-499 mg/dl       Very High          >499 mg/dl  Cholesterol:         Desirable        <200 mg/dl      Borderline High  200-239 mg/dl      High             >239 mg/dl  HDL Cholesterol:        High    >59 mg/dL      Low     <41 mg/dL  LDL Cholesterol:        Optimal          <100 mg/dl         Near Optimal     100-129 mg/dl        Above Optimal          Borderline High   130-159 mg/dl          High              160-189 mg/dl          Very High        >189 mg/dl  LDL CALCULATED:    This screening LDL is a calculated result  It does not have the accuracy of the Direct Measured LDL in the monitoring of patients with hyperlipidemia and/or statin therapy  Direct Measure LDL (WUQ719) must be ordered separately in these patients  Test Name Result Flag Reference   CHOLESTEROL 204 mg/dL H    LDL CHOLESTEROL CALCULATED 142 mg/dL H 0-100   TRIGLYCERIDES 115 mg/dL  <=150   Specimen collection should occur prior to N-Acetylcysteine or Metamizole administration due to the potential for falsely depressed results  HDL,DIRECT 39 mg/dL L 40-60   Specimen collection should occur prior to Metamizole administration due to the potential for falsely depressed results  (1) TSH WITH FT4 REFLEX 28Apr2016 06:16AM Claude Hu   Patients undergoing fluorescein dye angiography may retain small amounts of fluorescein in the body for 48-72 hours post procedure  Samples containing fluorescein can produce falsely depressed TSH values  If the patient had this procedure,a specimen should be resubmitted post fluorescein clearance  The recommended reference ranges for TSH during pregnancy are as follows:  First trimester 0 1 to 2 5 uIU/mL  Second trimester  0 2 to 3 0 uIU/mL  Third trimester 0 3 to 3 0 uIU/m     Test Name Result Flag Reference   TSH 1 652 uIU/mL  0 358-3 740     (1) VITAMIN D 25-HYDROXY 28Apr2016 06:16AM Claude Hu     Test Name Result Flag Reference   VIT D 25-HYDROX 29 4 ng/mL L 30 0-100 0     (1) HEMOGLOBIN A1C 28Apr2016 06:16AM Claude Hu   5 7-6 4% impaired fasting glucose  >=6 5% diagnosis of diabetes    Falsely low levels are seen in conditions linked to short RBC life span-  hemolytic anemia, and splenomegaly  Falsely elevated levels are seen in situations where there is an increased production of RBC- receipt of erythropoietin or blood transfusions  Adopted from ADA-Clinical Practice Recommendations     Test Name Result Flag Reference   HEMOGLOBIN A1C 7 0 % H 4 0-5 6   EST  AVG  GLUCOSE 154 mg/dl         Discussion/Summary   OVERALL OK  AIC SLIGHTLY BETTER  WILL DISCUSS IN THE OFFICE     Loy Kim

## 2018-01-15 NOTE — MISCELLANEOUS
Message   Recorded as Task   Date: 01/18/2017 10:52 AM, Created By: Jaimee Mitchell   Task Name: Miscellaneous   Assigned To: Preston Johnson   Regarding Patient: Maria T Barrera, Status: Active   Argentina Longo - 18 Jan 2017 10:52 AM     TASK CREATED  Pamella from Dr Milton Hurley office called  They are not going to do any treatment at this time   They are going to step up her home care for now  Tissue of gum seem to be inflammed  [phone]        Active Problems    1  Abnormal CXR (793 2) (R93 8)   2  Acid reflux (530 81) (K21 9)   3  Adenocarcinoma of right lung, stage 4 (162 9) (C34 91)   4  Anxiety (300 00) (F41 9)   5  Atrial fibrillation (427 31) (I48 91)   6  Benign essential hypertension (401 1) (I10)   7  Cancer related pain (338 3) (G89 3)   8  Chronic diastolic congestive heart failure (428 32,428 0) (I50 32)   9  Colon cancer screening (V76 51) (Z12 11)   10  Depression (311) (F32 9)   11  Diabetes mellitus type 2, uncomplicated (829 17) (S24 1)   12  Diarrhea (787 91) (R19 7)   13  Encounter for screening mammogram for breast cancer (V76 12) (Z12 31)   14  Hospital discharge follow-up (V67 59) (Z09)   15  Hyperlipidemia (272 4) (E78 5)   16  Insomnia (780 52) (G47 00)   17  Lung mass (786 6) (R91 8)   18  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)   19  Mammogram abnormal (793 80) (R92 8)   20  Metastatic bone cancer (170 9) (C41 9)   21  Nausea (787 02) (R11 0)   22  Need for prophylactic vaccination against Streptococcus pneumoniae (pneumococcus)    (V03 82) (Z23)   23  Need for prophylactic vaccination and inoculation against influenza (V04 81) (Z23)   24  Osteoporosis screening (V82 81) (Z13 820)   25  Pain in joint of left shoulder (719 41) (M25 512)   26  Palpitations (785 1) (R00 2)   27  Rib lesion (733 90) (M89 9)   28  Rib pain (786 50) (R07 81)   29  Screening for glaucoma (V80 1) (Z13 5)   30  Screening for other and unspecified genitourinary condition (V81 6) (Z13 89)   31   Situational anxiety (300 09) (F41 8)   32  Special screening for other neurological conditions (V80 09) (Z13 89)   33  Vitamin D deficiency (268 9) (E55 9)   34  Well female exam with routine gynecological exam (V72 31) (Z01 419)    Current Meds   1  ALPRAZolam 0 25 MG Oral Tablet; 1 tab PO TID prn anxiety; Therapy: 50EKA8157 to (Evaluate:20Jan2017); Last Rx:27Luo5127 Ordered   2  Doxycycline Hyclate 100 MG Oral Capsule; TAKE 1 CAPSULE DAILY WITH A MEAL; Therapy: (Recorded:05Jan2017) to Recorded   3  Eliquis 5 MG Oral Tablet; TAKE  1  TABLET Every twelve hours; Therapy: 90Psa4991 to (Evaluate:14Mar2017); Last Rx:14Nov2016 Ordered   4  MetFORMIN HCl - 1000 MG Oral Tablet; TAKE 1 TABLET TWICE DAILY; Therapy: 56FJI5450 to ([phone])  Requested for: 31FOR9806; Last   Rx:12Jan2017 Ordered   5  Metoprolol Tartrate 25 MG Oral Tablet; TAKE 1 TABLET TWICE DAILY  Requested for:   89YEA2176; Last FW:02ALL3433 Ordered   6  MiraLax Oral Packet (Polyethylene Glycol 3350); MIX 1 PACKET IN 8 OUNCES OF   LIQUID AND DRINK ONCE DAILY; Therapy: (Recorded:25Nov2016) to Recorded   7  Omeprazole 20 MG Oral Tablet Delayed Release; Take 1 tablet daily  Requested for:   12Jan2017; Last Rx:12Jan2017 Ordered   8  Ondansetron 4 MG Oral Tablet Dispersible; one tab every 6 hours as needed for nausea   related to cancer  Minimum three doses per day, prior to meals  Requested for:   43LYJ2407; Last Rx:10Jan2017 Ordered   9  Sotalol HCl - 80 MG Oral Tablet; TAKE 1 TABLET EVERY 12 HOURS DAILY  Requested   for: 89DST6907; Last Rx:13Jan2017 Ordered   10  Tradjenta 5 MG Oral Tablet; Take 1 tablet daily; Therapy: 27QCC3455 to (Evaluate:07Jan2018)  Requested for: 81EDF0067; Last    Rx:12Jan2017 Ordered   11  Venlafaxine HCl ER 75 MG Oral Capsule Extended Release 24 Hour; TAKE 1 CAPSULE    Bedtime; Therapy: 91ALT3232 to (Evaluate:20Jan2017)  Requested for: 53Hjp0842; Last    Rx:44Bdm1124 Ordered   12   Ventolin  (90 Base) MCG/ACT Inhalation Aerosol Solution; Inhale 2 puffs every    4-6 hours as needed for wheeze; Therapy: 61NJD8849 to (Last Rx:06Jan2017)  Requested for: 95APN8270 Ordered    Allergies    1  Amoxil TABS   2  Crestor TABS   3  Zetia TABS   4  Pravachol TABS   5  Simvastatin TABS    6   Mold    Signatures   Electronically signed by : Celeste Garrido, ; Jan 18 2017 11:01AM EST                       (Author)

## 2018-01-16 NOTE — MISCELLANEOUS
Message  Contacted Idania Knight discussed PET/CT results  As per Dr Oswaldo Galaviz she's to proceed with IR bx of T-10 as scheduled 10/18/16  Questions answered as able  Support provided  Active Problems    1  Abnormal CXR (793 2) (R93 8)   2  Acid reflux (530 81) (K21 9)   3  Acute right-sided low back pain without sciatica (724 2) (M54 5)   4  Atrial fibrillation (427 31) (I48 91)   5  Benign essential hypertension (401 1) (I10)   6  Colon cancer screening (V76 51) (Z12 11)   7  Community acquired pneumonia (5) (J18 9)   8  Depression (311) (F32 9)   9  Diabetes mellitus type 2, uncomplicated (800 55) (N86 3)   10  Encounter for screening mammogram for breast cancer (V76 12) (Z12 31)   11  Hospital discharge follow-up (V67 59) (Z09)   12  Hyperlipidemia (272 4) (E78 5)   13  Lung mass (786 6) (R91 8)   14  Mammogram abnormal (793 80) (R92 8)   15  Need for prophylactic vaccination against Streptococcus pneumoniae (pneumococcus)    (V03 82) (Z23)   16  Osteoporosis screening (V82 81) (Z13 820)   17  Pain in joint of left shoulder (719 41) (M25 512)   18  Palpitations (785 1) (R00 2)   19  Rib lesion (733 90) (M89 9)   20  Rib pain (786 50) (R07 81)   21  Screening for glaucoma (V80 1) (Z13 5)   22  Screening for other and unspecified genitourinary condition (V81 6) (Z13 89)   23  Situational anxiety (300 09) (F41 8)   24  Special screening for other neurological conditions (V80 09) (Z13 89)   25  Vitamin D deficiency (268 9) (E55 9)   26  Well female exam with routine gynecological exam (V72 31) (Z01 419)    Current Meds   1  Benazepril-Hydrochlorothiazide 20-12 5 MG Oral Tablet; take 1 tablet every day; Therapy: 03CMK9262 to (Evaluate:07Jan2017)  Requested for: 04IKX7706; Last   Rx:13Jan2016 Ordered   2  BuPROPion HCl ER (SR) 150 MG Oral Tablet Extended Release 12 Hour; take 2 tablet   daily; Therapy: 34MYF6167 to [phone])  Requested for: 13YBV6990 Recorded   3   Bystolic 10 MG Oral Tablet; Take 1 tablet daily; Therapy: 37TIZ0538 to (Evaluate:08Apr2017); Last Rx:10Oct2016 Ordered   4  Eliquis 5 MG Oral Tablet; TAKE  1  TABLET Every twelve hours; Therapy: 88Ucu1990 to (Evaluate:21Bwr7478); Last Rx:10Oct2016 Ordered   5  Hydrocodone-Acetaminophen 5-325 MG Oral Tablet Recorded   6  Hydrocodone-Acetaminophen 5-325 MG Oral Tablet; take 1 tablet every 4 to 6 hours as   needed for pain; Therapy: 44ASH4968 to (Evaluate:21Oct2016); Last Rx:11Oct2016 Ordered   7  Imodium A-D CAPS (Loperamide HCl) Recorded   8  MetFORMIN HCl - 1000 MG Oral Tablet; TAKE 1 TABLET TWICE DAILY; Therapy: 95XTB3458 to (Evaluate:16Jan2017)  Requested for: 84EYP2953; Last   Rx:24Jan2016 Ordered   9  Omeprazole 20 MG Oral Tablet Delayed Release; Take 1 tablet daily; Therapy: (Recorded:38Oem8801) to Recorded   10  Pioglitazone HCl - 15 MG Oral Tablet; Take 1 tablet daily; Therapy: 29MTX4796 to (Sarah Ann Harrison)  Requested for: 95WGA4065; Last    Rx:11Oct2016 Ordered   11  Vitamin D 1000 UNIT Oral Tablet Recorded    Allergies    1  Amoxil TABS   2  Crestor TABS   3  Zetia TABS   4  Pravachol TABS   5  Simvastatin TABS    6   Mold    Signatures   Electronically signed by : Teetee Cohn, ; Oct 17 2016  2:36PM EST                       (Author)

## 2018-01-16 NOTE — MISCELLANEOUS
Message   Recorded as Task   Date: 08/25/2017 09:10 AM, Created By: David Lopez   Task Name: Med Renewal Request   Assigned To: David Lopez   Regarding Patient: Maynor Monae, Status: In Progress   Comment:    Moon Castillo - 25 Aug 2017 9:10 AM     TASK CREATED  can you reschedule biopsy from Sept 11th  patient has lady gaga tickberenice     thanks   Magdalene Cohen - 25 Aug 2017 10:58 AM     TASK IN PROGRESS   Magdalene Cohen - 25 Aug 2017 3:31 PM     TASK EDITED  Checked Epic and the biopsy was never put in the schedule  Spoke with Adrian Mcleod who will send the case to the MD to review  She will call back with the appointment details  Moon Castillo - 29 Aug 2017 12:21 PM     TASK EDITED  any updates? thanks! Magdalene Cohen - 29 Aug 2017 3:07 PM     TASK EDITED  Called IR and spoke with Christian Hernándezing  Pt's chart is in a stack of other charts that need to be reviewed by the physician  Moon Castillo - 29 Aug 2017 3:19 PM     TASK EDITED        Active Problems    1  Abnormal chest CT (793 2) (R93 8)   2  Acid reflux (530 81) (K21 9)   3  Adenocarcinoma of right lung, stage 4 (162 9) (C34 91)   4  Anxiety (300 00) (F41 9)   5  Atrial fibrillation (427 31) (I48 91)   6  Benign essential hypertension (401 1) (I10)   7  Cancer related pain (338 3) (G89 3)   8  Chemical pneumonitis (506 0,E980 9) (J68 0)   9  Chronic diastolic congestive heart failure (428 32,428 0) (I50 32)   10  Chronic left shoulder pain (719 41,338 29) (M25 512,G89 29)   11  Colon cancer screening (V76 51) (Z12 11)   12  Diabetes mellitus type 2, uncomplicated (797 10) (V98 4)   13  Encounter for eye exam (V72 0) (Z01 00)   14  Encounter for screening mammogram for breast cancer (V76 12) (Z12 31)   15  Hospital discharge follow-up (V67 59) (Z09)   16  Hyperlipidemia (272 4) (E78 5)   17  Insomnia (780 52) (G47 00)   18  Mammogram abnormal (793 80) (R92 8)   19  Metastatic bone cancer (170 9) (C41 9)   20  Mild depression (311) (F32 0)   21   Need for prophylactic vaccination against Streptococcus pneumoniae (pneumococcus)    (V03 82) (Z23)   22  Need for prophylactic vaccination and inoculation against influenza (V04 81) (Z23)   23  Osteoporosis screening (V82 81) (Z13 820)   24  Pain in joint of left shoulder (719 41) (M25 512)   25  Screening for colon cancer (V76 51) (Z12 11)   26  Screening for glaucoma (V80 1) (Z13 5)   27  Screening for other and unspecified genitourinary condition (V81 6) (Z13 89)   28  Special screening for other neurological conditions (V80 09) (Z13 89)   29  Vitamin D deficiency (268 9) (E55 9)   30  Well female exam with routine gynecological exam (V72 31) (Z01 419)    Current Meds   1  Calcium 250 MG Oral Capsule; tab strength unknown; Therapy: 63Opq5512 to Recorded   2  Eliquis 5 MG Oral Tablet; 1 TABLET TWICE DAILY  Requested for: 67SVR2178; Last   Rx:76Wga8620 Ordered   3  MetFORMIN HCl - 1000 MG Oral Tablet; TAKE 1 TABLET TWICE DAILY; Therapy: 39IZA7825 to ([phone])  Requested for: 80MTR9582; Last   Rx:12Jan2017 Ordered   4  Metoprolol Tartrate 25 MG Oral Tablet; TAKE 1 TABLET TWICE DAILY  Requested for:   73WRQ1827; Last OD:27XJM0328 Ordered   5  Omeprazole 20 MG Oral Tablet Delayed Release; Take 1 tablet daily  Requested for:   12Jan2017; Last Rx:12Jan2017 Ordered   6  PredniSONE 10 MG Oral Tablet; 40 mg (4 tabs ) daily for 1 month then 30 mg daily for 1   month then 20 mg daily for 1 month; Therapy: 91XNK1385 to (Evaluate:85Cmk7526)  Requested for: 99WJW1267; Last   Rx:01Jun2017 Ordered   7  PredniSONE 20 MG Oral Tablet; Take 2 tablets daily as directed; Therapy: 97CVD3689 to  Requested for: 20Jun2017 Recorded   8  Sotalol HCl - 80 MG Oral Tablet; TAKE 1 TABLET EVERY 12 HOURS DAILY  Requested   for: 19REL0081; Last Rx:13Jan2017 Ordered   9  Sulfamethoxazole-Trimethoprim 800-160 MG Oral Tablet (Bactrim DS); TAKE 1 TABLET   DAILY ON MONDAY, WEDNESDAY, AND FRIDAY;    Therapy: 45ECB0887 to (Last Rx:12Rqq8853)  Requested for: 97HNV3683 Ordered   10  Tradjenta 5 MG Oral Tablet; Take 1 tablet daily; Therapy: 10CIU0789 to (Evaluate:07Jan2018)  Requested for: 17KYP1262; Last    Rx:12Jan2017 Ordered   11  Venlafaxine HCl ER 37 5 MG Oral Capsule Extended Release 24 Hour; Take 1 capsule    by mouth at  bedtime; Therapy: 11ECS0129 to ([phone])  Requested for: 49RDK6848; Last    Rx:77Plt1985 Ordered   12  Vitamin D 2000 UNIT Oral Tablet; TAKE 1 TAB BID AS DIRECTED; Therapy: 13GVP5824 to Recorded    Allergies    1  Amoxil TABS   2  Crestor TABS   3  Zetia TABS   4  Pravachol TABS   5  Simvastatin TABS    6   Mold    Signatures   Electronically signed by : Li Alberto, ; Aug 29 2017  3:19PM EST                       (Author)

## 2018-01-16 NOTE — MISCELLANEOUS
Message  Message Free Text Note Form: Received a call from the pateint's daughter Krystal Chavarria that the patient feels very anxious  Her heart is racing and she is scared she is going to go back into [facility]  She was given lorazepam in the hospital and it made her loopy  She is wondering if there is something else she can take  Plan    1  ALPRAZolam 0 25 MG Oral Tablet; 1 tab PO bid prn anxiety    Signatures   Electronically signed by :  PHYLLIS Clark ; Nov 24 2016 10:24AM EST                       (Author)

## 2018-01-16 NOTE — RESULT NOTES
Verified Results  * [facility] CHEST PA & LATERAL [id number] 06:17AM Yary Jauregui Order Number: [id number]     Test Name Result Flag Reference   XR CHEST PA & LATERAL (Report)     CHEST - DUAL ENERGY     INDICATION: Cough  Atrial fibrillation  Recent history of pneumonia  Former smoker  History of uterine cancer  COMPARISON: None     VIEWS: PA (including soft tissue/bone algorithms) and lateral projections; 4 images     FINDINGS:        Cardiomediastinal silhouette appears stable  Persistent consolidation in the right middle lobe  Visualized osseous structures appear within normal limits for the patient's age  IMPRESSION:     Persistent consolidation in the right middle lobe  Given the lack of change despite presumed treatment, CT scan of the chest with contrast recommended for further evaluation  ##imslh##imslh       Workstation performed: [id number]     Signed by:    Abdifatah Willams MD   9/26/16       Plan  Abnormal CXR, Community acquired pneumonia    · CT CHEST W CONTRAST; Status:Hold For - Scheduling; Requested for:26Sep2016;

## 2018-01-17 NOTE — PROGRESS NOTES
Discussion/Summary  Social Work-Discussion Summary [facility]: Patient is being seen for an ongoing assessment/follow up  LSW met with pt as a follow up on 11/9/2016  Pt discussed how she is scheduled for a palliative care follow up due to poor pain control  Pt expressed grief related to her recent diagnosis of stage IV lung cancer  LSW provided pt supportive counseling  Pt expressed interest in psychotherapy, therefore LSW provided pt with behavioral health provider list from [employer]'s website  Pt was encouraged to contact LSW if assistance is needed obtaining a psychotherapist  José Luis Navas is available provide pt ongoing psychosocial support        Signatures   Electronically signed by : SANAZ Simon; Nov 9 2016  4:24PM EST                       (Author)

## 2018-01-17 NOTE — MISCELLANEOUS
Message  Patient called to report cough started about one week ago  was productive x 3 - now just dry  Patient describes this as trying to "clear her throat"  Discussed with Dr Phill Pate - patient to try OTC cough syrup - instructed her to call if cough gets worse - patient does have follow up with Dr Phill Pate scheduled for next week  Patient agreeable and verbalized understanding      Active Problems    1  Abnormal CXR (793 2) (R93 8)   2  Acid reflux (530 81) (K21 9)   3  Adenocarcinoma of right lung, stage 4 (162 9) (C34 91)   4  Anxiety (300 00) (F41 9)   5  Atrial fibrillation (427 31) (I48 91)   6  Benign essential hypertension (401 1) (I10)   7  Cancer related pain (338 3) (G89 3)   8  Chronic diastolic congestive heart failure (428 32,428 0) (I50 32)   9  Colon cancer screening (V76 51) (Z12 11)   10  Depression (311) (F32 9)   11  Diabetes mellitus type 2, uncomplicated (231 55) (R56 0)   12  Diarrhea (787 91) (R19 7)   13  Encounter for screening mammogram for breast cancer (V76 12) (Z12 31)   14  Hospital discharge follow-up (V67 59) (Z09)   15  Hyperlipidemia (272 4) (E78 5)   16  Insomnia (780 52) (G47 00)   17  Lung mass (786 6) (R91 8)   18  Mammogram abnormal (793 80) (R92 8)   19  Metastatic bone cancer (170 9) (C41 9)   20  Nausea (787 02) (R11 0)   21  Need for prophylactic vaccination against Streptococcus pneumoniae (pneumococcus)    (V03 82) (Z23)   22  Need for prophylactic vaccination and inoculation against influenza (V04 81) (Z23)   23  Osteoporosis screening (V82 81) (Z13 820)   24  Pain in joint of left shoulder (719 41) (M25 512)   25  Palpitations (785 1) (R00 2)   26  Rib lesion (733 90) (M89 9)   27  Rib pain (786 50) (R07 81)   28  Screening for glaucoma (V80 1) (Z13 5)   29  Screening for other and unspecified genitourinary condition (V81 6) (Z13 89)   30  Situational anxiety (300 09) (F41 8)   31  Special screening for other neurological conditions (V80 09) (Z13 89)   32   Vitamin D deficiency (268 9) (E55 9)   33  Well female exam with routine gynecological exam (V72 31) (Z01 419)    Current Meds   1  ALPRAZolam 0 25 MG Oral Tablet; 1 tab PO TID prn anxiety; Therapy: 88HVE3185 to (Evaluate:20Jan2017); Last Rx:83Bid0222 Ordered   2  Eliquis 5 MG Oral Tablet; TAKE  1  TABLET Every twelve hours; Therapy: 17Btv5264 to (Evaluate:14Mar2017); Last Rx:14Nov2016 Ordered   3  MetFORMIN HCl - 1000 MG Oral Tablet; TAKE 1 TABLET TWICE DAILY; Therapy: 03PZO2945 to (Evaluate:16Jan2017)  Requested for: 89JDS3720; Last   Rx:24Jan2016 Ordered   4  Metoprolol Tartrate 25 MG Oral Tablet; TAKE 1 TABLET TWICE DAILY  Requested for:   73GCD5224; Last Rx:41Jbj1416 Ordered   5  MiraLax Oral Packet (Polyethylene Glycol 3350); MIX 1 PACKET IN 8 OUNCES OF   LIQUID AND DRINK ONCE DAILY; Therapy: (Recorded:25Nov2016) to Recorded   6  Omeprazole 20 MG Oral Tablet Delayed Release; Take 1 tablet daily; Therapy: (25-62-29-72) to Recorded   7  Ondansetron 4 MG Oral Tablet Dispersible; TAKE 1 TABLET Every 4 hours PRN nausea; Therapy: (Recorded:25Nov2016) to Recorded   8  OxyCODONE HCl - 10 MG Oral Tablet; TAKE 1 TABLET Every 4 hours PRN pain; Last   Rx:84Csm4525 Ordered   9  Pioglitazone HCl - 30 MG Oral Tablet; take 1 tablet once daily; Therapy: 78PKD4480 to (Evaluate:67Fep7863)  Requested for: 81URX0695; Last   Rx:63Tes9046 Ordered   10  Sotalol HCl - 80 MG Oral Tablet; TAKE 1 TABLET EVERY 12 HOURS DAILY  Requested    for: 07PAX2419; Last Rx:06Jdn6270 Ordered   11  Venlafaxine HCl ER 75 MG Oral Capsule Extended Release 24 Hour; TAKE 1 CAPSULE    Bedtime; Therapy: 66THW9273 to (Evaluate:20Jan2017)  Requested for: 34Amc4039; Last    Rx:52Hcm9397 Ordered    Allergies    1  Amoxil TABS   2  Crestor TABS   3  Zetia TABS   4  Pravachol TABS   5  Simvastatin TABS    6   Mold    Signatures   Electronically signed by : Kaitlyn Romeo, ; Dec 30 2016  3:45PM EST                       (Author)

## 2018-01-17 NOTE — MISCELLANEOUS
Message  Contacted Yumiko Farah she was notified of FB,EBUS scheduled for 11/17/16 with Dr Lorena Nicholas  She was told per Dr Carmen Layton to stop Eliquis after taking dose on 11/14/16  Active Problems    1  Abnormal CXR (793 2) (R93 8)   2  Acid reflux (530 81) (K21 9)   3  Adenocarcinoma of right lung, stage 4 (162 9) (C34 91)   4  Anxiety (300 00) (F41 9)   5  Atrial fibrillation (427 31) (I48 91)   6  Benign essential hypertension (401 1) (I10)   7  Cancer related pain (338 3) (G89 3)   8  Colon cancer screening (V76 51) (Z12 11)   9  Depression (311) (F32 9)   10  Diabetes mellitus type 2, uncomplicated (134 07) (G74 5)   11  Diarrhea (787 91) (R19 7)   12  Encounter for screening mammogram for breast cancer (V76 12) (Z12 31)   13  Hospital discharge follow-up (V67 59) (Z09)   14  Hyperlipidemia (272 4) (E78 5)   15  Insomnia (780 52) (G47 00)   16  Lung mass (786 6) (R91 8)   17  Mammogram abnormal (793 80) (R92 8)   18  Metastatic bone cancer (170 9) (C41 9)   19  Nausea (787 02) (R11 0)   20  Need for prophylactic vaccination against Streptococcus pneumoniae (pneumococcus)    (V03 82) (Z23)   21  Need for prophylactic vaccination and inoculation against influenza (V04 81) (Z23)   22  Osteoporosis screening (V82 81) (Z13 820)   23  Pain in joint of left shoulder (719 41) (M25 512)   24  Palpitations (785 1) (R00 2)   25  Rib lesion (733 90) (M89 9)   26  Rib pain (786 50) (R07 81)   27  Screening for glaucoma (V80 1) (Z13 5)   28  Screening for other and unspecified genitourinary condition (V81 6) (Z13 89)   29  Situational anxiety (300 09) (F41 8)   30  Special screening for other neurological conditions (V80 09) (Z13 89)   31  Vitamin D deficiency (268 9) (E55 9)   32  Well female exam with routine gynecological exam (V72 31) (Z01 419)    Current Meds   1  Benazepril-Hydrochlorothiazide 20-12 5 MG Oral Tablet; take 1 tablet every day;    Therapy: 40AMA4838 to (Evaluate:67Wxe3614)  Requested for: 01JCC4246; Last   Rx:13Jan2016 Ordered   2  BuPROPion HCl ER (SR) 150 MG Oral Tablet Extended Release 12 Hour; take 2 tablet   daily; Therapy: 23Apr2015 to (Evaluate:30Oct2017)  Requested for: 55YJZ3808; Last   Rx:04Nov2016 Ordered   3  Bystolic 10 MG Oral Tablet; Take 1 tablet daily; Therapy: 15RHC7008 to (Evaluate:08Apr2017); Last Rx:10Oct2016 Ordered   4  Eliquis 5 MG Oral Tablet; TAKE  1  TABLET Every twelve hours; Therapy: 33Fan4227 to (Evaluate:17Txh5810); Last Rx:10Oct2016 Ordered   5  HYDROmorphone HCl - 2 MG Oral Tablet; TAKE 1-2 TABLETS EVERY FOUR HOURS   AS NEEDED FOR PAIN;   Therapy: 91YAX1564 to (Evaluate:24Nov2016); Last Rx:09Nov2016 Ordered   6  Imodium A-D CAPS (Loperamide HCl); TAKE CAPSULE  PRN; Therapy: (Bhumi Hazy) to Recorded   7  MetFORMIN HCl - 1000 MG Oral Tablet; TAKE 1 TABLET TWICE DAILY; Therapy: 13KCP5606 to (Evaluate:16Jan2017)  Requested for: 94EXF1079; Last   Rx:24Jan2016 Ordered   8  Omeprazole 20 MG Oral Tablet Delayed Release; Take 1 tablet daily; Therapy: (Recorded:20Oct2016) to Recorded   9  Ondansetron HCl - 4 MG Oral Tablet; TAKE 1 TABLET Every 4 hours PRN nausea; Therapy: 54GAO8322 to (Evaluate:30Wlt8116)  Requested for: 87JJE2121; Last   Rx:09Nov2016 Ordered   10  OxyCODONE HCl - 5 MG Oral Tablet; TAKE 1 TABLET EVERY 4 HOURS AS NEEDED    FOR PAIN;    Therapy: 20OWH5746 to (Evaluate:29Gmp2146); Last Rx:08Nov2016 Ordered   11  Pioglitazone HCl - 15 MG Oral Tablet; Take 1 tablet daily; Therapy: 72NYK4120 to ([phone] 97 26)  Requested for: 85FHW7179; Last    Rx:11Oct2016 Ordered   12  Temazepam 30 MG Oral Capsule; TAKE 1 CAPSULE AT BEDTIME; Therapy: 62QAC7666 to (Evaluate:18Apr2017); Last Rx:20Oct2016 Ordered   13  Vitamin D3 1000 UNIT Oral Tablet; TAKE 1 TABLET DAILY; Therapy: (Recorded:26Oct2016) to Recorded    Allergies    1  Amoxil TABS   2  Crestor TABS   3  Zetia TABS   4  Pravachol TABS   5  Simvastatin TABS    6   Mold    Signatures   Electronically signed by : Giovany Montelongo, ; Nov 10 2016  9:27AM EST                       (Author)

## 2018-01-17 NOTE — RESULT NOTES
Verified Results  CT CHEST W CONTRAST 39TDK8238 12:48PM Kimberlyn Goodman Order Number: [id number]    - Patient Instructions: To schedule this appointment, please contact Central Scheduling at [phone]   Order Number: [id number]    - Patient Instructions: To schedule this appointment, please contact Central Scheduling at [phone]  Test Name Result Flag Reference   CT CHEST W CONTRAST (Report)     CT CHEST WITH IV CONTRAST     INDICATION: Follow-up persistent abnormality seen on chest x-ray  COMPARISON: Chest x-ray 9/26/2016  TECHNIQUE: CT examination of the chest was performed  85 mL of Omnipaque 350 was injected intravenously  Axial, sagittal and coronal reformatted images were submitted for interpretation  Coronal thick section MIP (maximal intensity projection) images    were also created  This examination, like all CT scans performed in the [facility], was performed utilizing techniques to minimize radiation dose exposure, including the use of iterative reconstruction and automated exposure control  FINDINGS:     LUNGS: A spiculated 3 0 x 2 5 cm partially necrotic right perihilar mass is identified  Associated atelectasis is present within the right middle lobe accounting for chest x-ray abnormality  20 mm left upper lobe groundglass nodule is present  PLEURA: Unremarkable  HEART/GREAT VESSELS: Unremarkable for patient's age  MEDIASTINUM AND ANABEL: Necrotic appearing 3 2 x 2 4 cm subcarinal lymph node is identified  CHEST WALL AND LOWER NECK: Unremarkable  VISUALIZED STRUCTURES IN THE UPPER ABDOMEN: Subcentimeter hepatic hypodensities are identified  OSSEOUS STRUCTURES: There is a lytic lesion centered about the right costovertebral junction at T10        IMPRESSION:     Right perihilar mass with associated right middle lobe atelectasis accounting for chest x-ray abnormality  Necrotic appearing subcarinal lymphadenopathy   Findings are suspicious for primary     Lytic lesion centered about the right costovertebral junction region at T10 consistent with a metastatic focus  Hepatic hypodensities are too small to characterize however metastatic disease is not excluded  20 mm left upper lobe groundglass nodule  ##imslh##imslh       Workstation performed: [id number]     Signed by:    Alexus Mcmahon MD   10/3/16   85

## 2018-01-18 NOTE — CONSULTS
I had the pleasure of evaluating your patient, Zena Lam  My full evaluation follows:      Chief Complaint  6 month cardiac follow up with EKG  Patient is here today for follow up of chronic conditions described in HPI  History of Present Illness  Ms Patricia Rojo is a 69year old with hypertension, diabetes, stage IV lung ca, paroxysmal atrial fibrillation, who returns for follow up  Doing well from a cardiac standpoint  No further palpitations  Review of Systems      Cardiac: No complaints of chest pain, no palpitations, no fainting  Skin: No complaints of nonhealing sores or skin rash  Genitourinary: No complaints of recurrent urinary tract infections, frequent urination at night, difficult urination, blood in urine, kidney stones, loss of bladder control, kidney problems, denies any birth control or hormone replacement, is not post menopausal, not currently pregnant  Psychological: No complaints of feeling depressed, anxiety, panic attacks, or difficulty concentrating  General: No complaints of trouble sleeping, lack of energy, fatigue, appetite changes, weight changes, fever, frequent infections, or night sweats  Respiratory: No complaints of shortness of breath, cough with sputum, or wheezing  HEENT: No complaints of serious problems, hearing problems, nose problems, throat problems, or snoring  Gastrointestinal: No complaints of liver problems, nausea, vomiting, heartburn, constipation, bloody stools, diarrhea, problems swallowing, adbominal pain, or rectal bleeding  Hematologic: No complaints of bleeding disorders, anemia, blood clots, or excessive brusing  Neurological: No complaints of numbness, tingling, dizziness, weakness, seizures, headaches, syncope or fainting, AM fatigue, daytime sleepiness, no witnessed apnea episodes  Musculoskeletal: No complaints of arthritis, back pain, or painfull swelling  Active Problems    1  Abnormal chest CT (793 2) (R93 8)   2   Acid reflux (530 81) (K21 9)   3  Adenocarcinoma of right lung, stage 4 (162 9) (C34 91)   4  Anxiety (300 00) (F41 9)   5  Atrial fibrillation (427 31) (I48 91)   6  Benign essential hypertension (401 1) (I10)   7  Cancer related pain (338 3) (G89 3)   8  Chemical pneumonitis (506 0,E980 9) (J68 0)   9  Chronic diastolic congestive heart failure (428 32,428 0) (I50 32)   10  Chronic left shoulder pain (719 41,338 29) (M25 512,G89 29)   11  Colon cancer screening (V76 51) (Z12 11)   12  Diabetes mellitus type 2, uncomplicated (214 08) (M44 5)   13  Encounter for eye exam (V72 0) (Z01 00)   14  Encounter for screening mammogram for breast cancer (V76 12) (Z12 31)   15  Hospital discharge follow-up (V67 59) (Z09)   16  Hyperlipidemia (272 4) (E78 5)   17  Hypoxemia (799 02) (R09 02)   18  Insomnia (780 52) (G47 00)   19  Mammogram abnormal (793 80) (R92 8)   20  Metastatic bone cancer (170 9) (C41 9)   21  Mild depression (311) (F32 0)   22  Need for prophylactic vaccination against Streptococcus pneumoniae (pneumococcus)    (V03 82) (Z23)   23  Need for prophylactic vaccination and inoculation against influenza (V04 81) (Z23)   24  Osteoporosis screening (V82 81) (Z13 820)   25  Pain in joint of left shoulder (719 41) (M25 512)   26  Screening for colon cancer (V76 51) (Z12 11)   27  Screening for glaucoma (V80 1) (Z13 5)   28  Screening for other and unspecified genitourinary condition (V81 6) (Z13 89)   29  Special screening for other neurological conditions (V80 09) (Z13 89)   30  Vitamin D deficiency (268 9) (E55 9)   31   Well female exam with routine gynecological exam (V72 31) (Z01 419)    Past Medical History    · History of Community acquired pneumonia (5) (J18 9)   · History of arthritis (V13 4) (Z87 39)   · History of cancer of uterus (V10 42) (Z85 42)   · History of hypertension (V12 59) (Z86 79)   · History of radiation therapy (V15 3) (Z92 3)   · History of Malignant neoplasm without specification of site (199 1) (C80 1)   · History of Racing heart beat (785 0) (R00 0)   · History of Skin cancer, basal cell (173 91) (C44 91)   · History of Upper respiratory infection with cough and congestion (465 9) (J06 9)    The active problems and past medical history were reviewed and updated today  Surgical History    · History of Appendectomy   · History of Bronchoscopy (Therap) W/ EBUS Guid Transendoscopic, For Periph Lesions   · History of Flexible Fiberoptic Laryngoscopy (Therapeutic)   · History of Gallbladder Surgery   · History of Knee Replacement   · History of Mohs Micrographic Surgery Face   · History of Tonsillectomy With Adenoidectomy   · History of Total Abdominal Hysterectomy    The surgical history was reviewed and updated today  Family History    · Family history of    · Family history of cerebrovascular accident (V17 1) (Z82 3)   · Family history of dementia (V17 2) (Z81 8)   · Family history of diabetes mellitus (V18 0) (Z83 3)   · Family history of hypertension (V17 49) (Z82 49)   · Family history of thyroid disease (V18 19) (Z83 49)    · Family history of arthritis (V17 7) (Z82 61)   · Family history of cardiac disorder (V17 49) (Z82 49)   · Family history of cerebrovascular accident (V17 1) (Z82 3)   · Family history of hypertension (V17 49) (Z82 49)    · Family history of Cancer of unknown origin    · Family history of lung cancer (V16 1) (Z80 1)    · Family history of Cancer   · Family history of Depression   · Family history of Diabetes Mellitus (V18 0)   · Family history of Essential Hypercholesterolemia   · Family history of Heart Disease (V17 49)   · Family history of Hypertension (V17 49)   · Family history of Stroke Syndrome (V17 1)    The family history was reviewed and updated today         Social History    · Daily caffeinated coffee consumption   · Former smoker (V15 82) (T51 273)   · Lives with family   · Never Drank Alcohol   · No drug use   · Retired   ·   The social history was reviewed and updated today  The social history was reviewed and is unchanged  Current Meds   1  Calcium + D3 TABS; Take 1 tablet twice daily; Therapy: (Recorded:84Ayo4049) to Recorded   2  Eliquis 5 MG Oral Tablet; take one tablet by mouth twice daily; Therapy: 34HXU4532 to 0477 11 28 98)  Requested for: 40LTK2504; Last   Rx:62Lac2184 Ordered   3  MetFORMIN HCl - 1000 MG Oral Tablet; TAKE 1 TABLET TWICE DAILY; Therapy: 46ORG2644 to ([phone])  Requested for: 96RAJ4249; Last   Rx:12Jan2017 Ordered   4  Metoprolol Tartrate 25 MG Oral Tablet; TAKE 1 TABLET TWICE DAILY  Requested for:   06PSV7696; Last JE:02DXV3557 Ordered   5  Omeprazole 20 MG Oral Tablet Delayed Release; Take 1 tablet daily  Requested for:   12Jan2017; Last Rx:12Jan2017 Ordered   6  Sotalol HCl - 80 MG Oral Tablet; TAKE 1 TABLET EVERY 12 HOURS DAILY  Requested   for: 26ABP4343; Last Rx:13Jan2017 Ordered   7  Tradjenta 5 MG Oral Tablet; Take 1 tablet daily; Therapy: 12FNF9787 to (Evaluate:07Jan2018)  Requested for: 73SWW8315; Last   Rx:12Jan2017 Ordered   8  Tylenol Extra Strength 500 MG Oral Tablet; take 2 tablet twice daily; Therapy: (Joaquin Marks) to Recorded   9  Venlafaxine HCl ER 37 5 MG Oral Capsule Extended Release 24 Hour; Take 1 capsule   by mouth at  bedtime; Therapy: 03NIK7184 to ([phone])  Requested for: 92OCN2242; Last   Rx:97Iyj1548 Ordered   10  Ventolin  (90 Base) MCG/ACT Inhalation Aerosol Solution; INHALE 2 PUFFS    EVERY 4-6 HOURS AS NEEDED; Therapy: (Joaquin Marks) to Recorded   11  Vitamin D 2000 UNIT Oral Tablet; TAKE 1 TAB BID AS DIRECTED; Therapy: 33XWU4196 to Recorded    The medication list was reviewed and updated today  Allergies    1  Amoxil TABS   2  Crestor TABS   3  Zetia TABS   4  Pravachol TABS   5  Simvastatin TABS    6   Mold    Vitals   Recorded: 64ESB9862 08:31AM   Heart Rate 71, Apical   Systolic 557, LUE, Sitting   Diastolic 60, LUE, Sitting   Height 5 ft 5 in   Weight 210 lb    BMI Calculated 34 95   BSA Calculated 2 02   O2 Saturation 97, RA     Physical Exam    Constitutional   General appearance: No acute distress, well appearing and well nourished  Eyes   Conjunctiva and Sclera examination: Conjunctiva pink, sclera anicteric  Ears, Nose, Mouth, and Throat - External inspection of ears and nose: Normal without deformities or discharge  Neck   Neck and thyroid: Normal, supple, trachea midline, no thyromegaly  Pulmonary   Respiratory effort: No increased work of breathing or signs of respiratory distress  Auscultation of lungs: Clear to auscultation, no rales, no rhonchi, no wheezing, good air movement  Cardiovascular   Auscultation of heart: Normal rate and rhythm, normal S1 and S2, no murmurs  Examination of extremities for edema and/or varicosities: Normal     Chest - Chest: Normal    Abdomen   Abdomen: Non-tender and no distention  Musculoskeletal Gait and station: Normal gait  Skin - Skin and subcutaneous tissue: Normal without rashes or lesions  Skin is warm and well perfused, normal turgor  Neurologic - Speech: Normal     Psychiatric - Orientation to person, place, and time: Normal  Mood and affect: Normal       Results/Data  A 12 lead ECG was performed and was normal    Rhythm and rate:  ventricular rate is 71 beats per minute  normal sinus rhythm  Assessment    1  Atrial fibrillation (427 31) (I48 91)   2  Benign essential hypertension (401 1) (I10)    Plan  Atrial fibrillation    · EKG/ECG- POC; Status:Complete;   Done: 21DQQ1044 08:30AM   Perform: In Office; Last Updated Verona Lund; 10/3/2017 8:40:02 AM;Ordered; For:Atrial fibrillation; Ordered By:Guerrero Galo; Atrial fibrillation, Benign essential hypertension    · Follow-up visit in 6 months Evaluation and Treatment  Follow-up  Status: Hold For -  Scheduling  Requested for: 40CQK3446   Ordered;  For: Atrial fibrillation, Benign essential hypertension; Ordered By: Godwin Duvall Performed:  Due: 33TGC9691    Discussion/Summary    69year old with hypertension, diabetes, stage IV lung ca, paroxysmal atrial fibrillation, who returns for follow up  Doing well from a cardiac standpoint  No further palpitations  Impression:  1  Paroxysmal atrial fibrillation - in normal sinus rhythm  On antiarrhythmic medications  On anticoagulation  2  Hypertension - borderline control  Recommendations:  1  Continue current medications  If blood pressure still elevated in a few months, would consider starting ACE-I   2  Follow up in 6 months  Thank you very much for allowing me to participate in the care of this patient  If you have any questions, please do not hesitate to contact me        Future Appointments    Signatures   Electronically signed by : PHYLLIS Aguilar ; Oct  3 2017  8:52AM EST                       (Author)

## 2018-01-22 VITALS
WEIGHT: 231 LBS | HEART RATE: 69 BPM | TEMPERATURE: 98 F | DIASTOLIC BLOOD PRESSURE: 70 MMHG | SYSTOLIC BLOOD PRESSURE: 120 MMHG | OXYGEN SATURATION: 97 % | HEIGHT: 65 IN | RESPIRATION RATE: 16 BRPM | BODY MASS INDEX: 38.49 KG/M2

## 2018-01-23 VITALS
RESPIRATION RATE: 16 BRPM | HEART RATE: 70 BPM | TEMPERATURE: 96.3 F | BODY MASS INDEX: 36.18 KG/M2 | SYSTOLIC BLOOD PRESSURE: 140 MMHG | HEIGHT: 65 IN | OXYGEN SATURATION: 96 % | WEIGHT: 217.13 LBS | DIASTOLIC BLOOD PRESSURE: 80 MMHG

## 2018-01-23 VITALS
OXYGEN SATURATION: 98 % | BODY MASS INDEX: 35.16 KG/M2 | TEMPERATURE: 97.3 F | HEIGHT: 65 IN | WEIGHT: 211 LBS | SYSTOLIC BLOOD PRESSURE: 132 MMHG | DIASTOLIC BLOOD PRESSURE: 68 MMHG | RESPIRATION RATE: 16 BRPM | HEART RATE: 58 BPM

## 2018-01-23 VITALS
HEART RATE: 64 BPM | RESPIRATION RATE: 18 BRPM | BODY MASS INDEX: 35.4 KG/M2 | HEIGHT: 65 IN | DIASTOLIC BLOOD PRESSURE: 80 MMHG | SYSTOLIC BLOOD PRESSURE: 118 MMHG | WEIGHT: 212.5 LBS

## 2018-01-23 NOTE — RESULT NOTES
Discussion/Summary   BETTER   DR Klein Prom     Verified Results  (1) HEMOGLOBIN A1C 76VUM0263 08:54AM Redge Santos Order Number: [id number]     Test Name Result Flag Reference   HEMOGLOBIN A1C 7 1 % H 4 2-6 3   EST  AVG   GLUCOSE 157 mg/dl

## 2018-01-24 VITALS
HEIGHT: 65 IN | BODY MASS INDEX: 35.36 KG/M2 | TEMPERATURE: 96.5 F | HEART RATE: 60 BPM | SYSTOLIC BLOOD PRESSURE: 124 MMHG | RESPIRATION RATE: 16 BRPM | DIASTOLIC BLOOD PRESSURE: 68 MMHG | WEIGHT: 212.25 LBS

## 2018-02-02 DIAGNOSIS — I48.91 ATRIAL FIBRILLATION, UNSPECIFIED TYPE (HCC): Primary | ICD-10-CM

## 2018-02-15 DIAGNOSIS — I48.91 ATRIAL FIBRILLATION, UNSPECIFIED TYPE (HCC): ICD-10-CM

## 2018-02-19 DIAGNOSIS — I48.91 ATRIAL FIBRILLATION, UNSPECIFIED TYPE (HCC): Primary | ICD-10-CM

## 2018-02-19 RX ORDER — SOTALOL HYDROCHLORIDE 80 MG/1
80 TABLET ORAL EVERY 12 HOURS SCHEDULED
Qty: 180 TABLET | Refills: 3 | Status: SHIPPED | OUTPATIENT
Start: 2018-02-19 | End: 2018-03-09 | Stop reason: SDUPTHER

## 2018-02-20 ENCOUNTER — TELEPHONE (OUTPATIENT)
Dept: FAMILY MEDICINE CLINIC | Facility: CLINIC | Age: 68
End: 2018-02-20

## 2018-02-28 ENCOUNTER — OFFICE VISIT (OUTPATIENT)
Dept: FAMILY MEDICINE CLINIC | Facility: CLINIC | Age: 68
End: 2018-02-28
Payer: MEDICARE

## 2018-02-28 VITALS
DIASTOLIC BLOOD PRESSURE: 56 MMHG | RESPIRATION RATE: 18 BRPM | HEIGHT: 63 IN | BODY MASS INDEX: 37.49 KG/M2 | HEART RATE: 61 BPM | SYSTOLIC BLOOD PRESSURE: 130 MMHG | OXYGEN SATURATION: 98 % | TEMPERATURE: 97.4 F | WEIGHT: 211.6 LBS

## 2018-02-28 DIAGNOSIS — C34.91 MALIGNANT NEOPLASM OF UNSPECIFIED PART OF RIGHT BRONCHUS OR LUNG (HCC): ICD-10-CM

## 2018-02-28 DIAGNOSIS — I10 BENIGN ESSENTIAL HYPERTENSION: ICD-10-CM

## 2018-02-28 DIAGNOSIS — J06.9 ACUTE UPPER RESPIRATORY INFECTION: Primary | ICD-10-CM

## 2018-02-28 DIAGNOSIS — I50.32 CHRONIC DIASTOLIC HEART FAILURE (HCC): ICD-10-CM

## 2018-02-28 DIAGNOSIS — I48.91 ATRIAL FIBRILLATION, UNSPECIFIED TYPE (HCC): ICD-10-CM

## 2018-02-28 PROBLEM — F32.A MILD DEPRESSION: Status: ACTIVE | Noted: 2017-08-17

## 2018-02-28 PROBLEM — IMO0001 METASTATIC BONE CANCER: Status: ACTIVE | Noted: 2017-05-24

## 2018-02-28 PROCEDURE — 99213 OFFICE O/P EST LOW 20 MIN: CPT | Performed by: NURSE PRACTITIONER

## 2018-02-28 RX ORDER — SULFAMETHOXAZOLE AND TRIMETHOPRIM 800; 160 MG/1; MG/1
1 TABLET ORAL EVERY 12 HOURS SCHEDULED
Qty: 20 TABLET | Refills: 0 | Status: SHIPPED | OUTPATIENT
Start: 2018-02-28 | End: 2018-03-10

## 2018-02-28 RX ORDER — SULFAMETHOXAZOLE AND TRIMETHOPRIM 800; 160 MG/1; MG/1
1 TABLET ORAL EVERY 12 HOURS SCHEDULED
COMMUNITY
End: 2018-02-28 | Stop reason: SDUPTHER

## 2018-02-28 NOTE — PROGRESS NOTES
Assessment/Plan:           Problem List Items Addressed This Visit     Chronic diastolic heart failure (HCC)    Malignant neoplasm of unspecified part of right bronchus or lung ([facility] [address] )    Benign essential hypertension      Other Visit Diagnoses     Acute upper respiratory infection    -  Primary    Relevant Medications    sulfamethoxazole-trimethoprim (BACTRIM DS) 800-160 mg per tablet            Subjective:      Patient ID: Christiano Manriquez is a 69 y o  female  Here for c/o cough for 2 weeks  Not a lot of mucous  Hx of lung cancer  Follows with hematology  No fever, no sob  Due to see heme/onc in march  Due for pet scan in 2 weeks  Exposed to rsv recently   No ear pain, ha or nasal congestion  Occasional sinus           The following portions of the patient's history were reviewed and updated as appropriate: allergies, current medications, past family history, past medical history, past social history, past surgical history and problem list     Review of Systems   Constitutional: Positive for fatigue  Negative for activity change, appetite change and fever  HENT: Negative for postnasal drip, rhinorrhea and sore throat  Eyes: Negative for photophobia and visual disturbance  Respiratory: Positive for cough  Negative for shortness of breath and wheezing  Cardiovascular: Negative for chest pain and palpitations  Gastrointestinal: Negative for abdominal pain, constipation, diarrhea, nausea and vomiting  Musculoskeletal: Negative for back pain and neck pain  Skin: Negative for rash  Neurological: Negative for dizziness and headaches  Hematological: Negative for adenopathy           Objective:      /56   Pulse 61   Temp (!) 97 4 °F (36 3 °C)   Resp 18   Ht 5' 3 39" (1 61 m)   Wt 96 kg (211 lb 9 6 oz)   SpO2 98%   BMI 37 03 kg/m²     Family History   Problem Relation Age of Onset    Heart disease Father      Past Medical History:   Diagnosis Date    Atrial fibrillation ([facility] [address] )     Diabetes mellitus ([address] )     Frozen shoulder     L shoulder    GERD (gastroesophageal reflux disease)     HTN (hypertension)     Hx of cancer of uterus     Hyperlipidemia     Lung mass     diagnosed 9/2016    Stage 4 lung cancer [facility])      Social History     Social History    Marital status:      Spouse name: N/A    Number of children: N/A    Years of education: N/A     Occupational History    retired      Social History Main Topics    Smoking status: Former Smoker     Packs/day: 1 00    Smokeless tobacco: Never Used      Comment: Quit in 2000    Alcohol use No    Drug use: No    Sexual activity: Not on file     Other Topics Concern    Not on file     Social History Narrative    Lives with family  Current Outpatient Prescriptions:     sulfamethoxazole-trimethoprim (BACTRIM DS) 800-160 mg per tablet, Take 1 tablet by mouth every 12 (twelve) hours for 10 days, Disp: 20 tablet, Rfl: 0    Albuterol Sulfate (VENTOLIN HFA IN), Inhale, Disp: , Rfl:     apixaban (ELIQUIS) 5 mg, Take 1 tablet (5 mg total) by mouth 2 (two) times a day for 90 days Holding until 11/18/2016, Disp: 180 tablet, Rfl: 3    Linagliptin (TRADJENTA) 5 MG TABS, Take 5 mg by mouth daily, Disp: , Rfl:     metFORMIN (GLUCOPHAGE) 1000 MG tablet, Take 1,000 mg by mouth 2 (two) times a day with meals  , Disp: , Rfl:     metoprolol tartrate (LOPRESSOR) 25 mg tablet, Take 1 tablet (25 mg total) by mouth every 12 (twelve) hours for 30 days, Disp: 180 tablet, Rfl: 3    omeprazole (PriLOSEC) 20 mg delayed release capsule, Take 20 mg by mouth daily, Disp: , Rfl:     sotalol (BETAPACE) 80 mg tablet, Take 1 tablet (80 mg total) by mouth every 12 (twelve) hours for 30 days, Disp: 180 tablet, Rfl: 3    venlafaxine (EFFEXOR) 75 mg tablet, Take 37 5 mg by mouth daily  , Disp: , Rfl:   Allergies   Allergen Reactions    Cardizem [Diltiazem]      Rash      Statins Other (See Comments)     Severe muscle aching    Amoxicillin Rash Physical Exam   Constitutional: She is oriented to person, place, and time  She appears well-developed and well-nourished  HENT:   Head: Normocephalic and atraumatic  Right Ear: External ear normal    Left Ear: External ear normal    Nose: Nose normal    Mouth/Throat: Oropharynx is clear and moist    Eyes: Conjunctivae are normal    Neck: Normal range of motion  Neck supple  Cardiovascular: Normal rate and normal heart sounds  Pulmonary/Chest: Effort normal and breath sounds normal    Abdominal: Soft  Bowel sounds are normal    Musculoskeletal: Normal range of motion  Neurological: She is alert and oriented to person, place, and time  Skin: Skin is warm and dry  Psychiatric: She has a normal mood and affect   Her behavior is normal  Judgment and thought content normal

## 2018-03-07 NOTE — PROGRESS NOTES
Thoracic Oncology Multidisciplinary Conference: Kimber Apt was discussed at Thoracic Tumor Conference  The groups recommendation was: to have pulmonary do a Bronchoscopy to obtain cultures and tissue        Signatures   Electronically signed by : Elidia Mancilla, ; May 15 2017  2:54PM EST                       (Author)

## 2018-03-09 DIAGNOSIS — I48.91 ATRIAL FIBRILLATION, UNSPECIFIED TYPE (HCC): ICD-10-CM

## 2018-03-12 RX ORDER — SOTALOL HYDROCHLORIDE 80 MG/1
80 TABLET ORAL EVERY 12 HOURS SCHEDULED
Qty: 180 TABLET | Refills: 3 | Status: SHIPPED | OUTPATIENT
Start: 2018-03-12 | End: 2019-02-25 | Stop reason: SDUPTHER

## 2018-03-28 ENCOUNTER — HOSPITAL ENCOUNTER (EMERGENCY)
Facility: HOSPITAL | Age: 68
Discharge: HOME | End: 2018-03-28
Attending: EMERGENCY MEDICINE | Admitting: EMERGENCY MEDICINE
Payer: MEDICARE

## 2018-03-28 ENCOUNTER — APPOINTMENT (EMERGENCY)
Dept: RADIOLOGY | Facility: HOSPITAL | Age: 68
End: 2018-03-28
Payer: MEDICARE

## 2018-03-28 VITALS
HEIGHT: 65 IN | WEIGHT: 221.3 LBS | RESPIRATION RATE: 18 BRPM | DIASTOLIC BLOOD PRESSURE: 66 MMHG | SYSTOLIC BLOOD PRESSURE: 147 MMHG | HEART RATE: 60 BPM | OXYGEN SATURATION: 93 % | BODY MASS INDEX: 36.87 KG/M2 | TEMPERATURE: 98.6 F

## 2018-03-28 DIAGNOSIS — R07.9 CHEST PAIN: Primary | ICD-10-CM

## 2018-03-28 LAB
ALBUMIN SERPL BCP-MCNC: 3.5 G/DL (ref 3.5–5)
ALP SERPL-CCNC: 90 U/L (ref 46–116)
ALT SERPL W P-5'-P-CCNC: 24 U/L (ref 12–78)
ANION GAP SERPL CALCULATED.3IONS-SCNC: 10 MMOL/L (ref 4–13)
APTT PPP: 34 SECONDS (ref 23–35)
AST SERPL W P-5'-P-CCNC: 23 U/L (ref 5–45)
BACTERIA UR QL AUTO: ABNORMAL /HPF
BASOPHILS # BLD AUTO: 0.03 THOUSANDS/ΜL (ref 0–0.1)
BASOPHILS NFR BLD AUTO: 0 % (ref 0–1)
BILIRUB SERPL-MCNC: 0.7 MG/DL (ref 0.2–1)
BILIRUB UR QL STRIP: NEGATIVE
BUN SERPL-MCNC: 18 MG/DL (ref 5–25)
CALCIUM SERPL-MCNC: 9.1 MG/DL (ref 8.3–10.1)
CHLORIDE SERPL-SCNC: 103 MMOL/L (ref 100–108)
CLARITY UR: CLEAR
CO2 SERPL-SCNC: 28 MMOL/L (ref 21–32)
COLOR UR: YELLOW
CREAT SERPL-MCNC: 0.83 MG/DL (ref 0.6–1.3)
EOSINOPHIL # BLD AUTO: 0.29 THOUSAND/ΜL (ref 0–0.61)
EOSINOPHIL NFR BLD AUTO: 4 % (ref 0–6)
ERYTHROCYTE [DISTWIDTH] IN BLOOD BY AUTOMATED COUNT: 14 % (ref 11.6–15.1)
EST. AVERAGE GLUCOSE BLD GHB EST-MCNC: 157 MG/DL
GFR SERPL CREATININE-BSD FRML MDRD: 72 ML/MIN/1.73SQ M
GLUCOSE SERPL-MCNC: 119 MG/DL (ref 65–140)
GLUCOSE UR STRIP-MCNC: NEGATIVE MG/DL
HBA1C MFR BLD: 7.1 % (ref 4.2–6.3)
HCT VFR BLD AUTO: 38.6 % (ref 34.8–46.1)
HGB BLD-MCNC: 12.5 G/DL (ref 11.5–15.4)
HGB UR QL STRIP.AUTO: ABNORMAL
INR PPP: 1.05 (ref 0.86–1.16)
KETONES UR STRIP-MCNC: NEGATIVE MG/DL
LEUKOCYTE ESTERASE UR QL STRIP: NEGATIVE
LYMPHOCYTES # BLD AUTO: 1.12 THOUSANDS/ΜL (ref 0.6–4.47)
LYMPHOCYTES NFR BLD AUTO: 14 % (ref 14–44)
MAGNESIUM SERPL-MCNC: 1.5 MG/DL (ref 1.6–2.6)
MCH RBC QN AUTO: 26.7 PG (ref 26.8–34.3)
MCHC RBC AUTO-ENTMCNC: 32.4 G/DL (ref 31.4–37.4)
MCV RBC AUTO: 82 FL (ref 82–98)
MONOCYTES # BLD AUTO: 0.52 THOUSAND/ΜL (ref 0.17–1.22)
MONOCYTES NFR BLD AUTO: 7 % (ref 4–12)
NEUTROPHILS # BLD AUTO: 5.96 THOUSANDS/ΜL (ref 1.85–7.62)
NEUTS SEG NFR BLD AUTO: 75 % (ref 43–75)
NITRITE UR QL STRIP: NEGATIVE
NON-SQ EPI CELLS URNS QL MICRO: ABNORMAL /HPF
NT-PROBNP SERPL-MCNC: 298 PG/ML
PH UR STRIP.AUTO: 5.5 [PH] (ref 4.5–8)
PLATELET # BLD AUTO: 244 THOUSANDS/UL (ref 149–390)
PMV BLD AUTO: 11 FL (ref 8.9–12.7)
POTASSIUM SERPL-SCNC: 3.9 MMOL/L (ref 3.5–5.3)
PROT SERPL-MCNC: 7.2 G/DL (ref 6.4–8.2)
PROT UR STRIP-MCNC: NEGATIVE MG/DL
PROTHROMBIN TIME: 14 SECONDS (ref 12.1–14.4)
RBC # BLD AUTO: 4.69 MILLION/UL (ref 3.81–5.12)
RBC #/AREA URNS AUTO: ABNORMAL /HPF
SODIUM SERPL-SCNC: 141 MMOL/L (ref 136–145)
SP GR UR STRIP.AUTO: 1.01 (ref 1–1.03)
TROPONIN I SERPL-MCNC: <0.02 NG/ML
UROBILINOGEN UR QL STRIP.AUTO: 0.2 E.U./DL
WBC # BLD AUTO: 7.92 THOUSAND/UL (ref 4.31–10.16)
WBC #/AREA URNS AUTO: ABNORMAL /HPF

## 2018-03-28 PROCEDURE — 83036 HEMOGLOBIN GLYCOSYLATED A1C: CPT | Performed by: EMERGENCY MEDICINE

## 2018-03-28 PROCEDURE — 84484 ASSAY OF TROPONIN QUANT: CPT | Performed by: EMERGENCY MEDICINE

## 2018-03-28 PROCEDURE — 85610 PROTHROMBIN TIME: CPT | Performed by: EMERGENCY MEDICINE

## 2018-03-28 PROCEDURE — 85025 COMPLETE CBC W/AUTO DIFF WBC: CPT | Performed by: EMERGENCY MEDICINE

## 2018-03-28 PROCEDURE — 93005 ELECTROCARDIOGRAM TRACING: CPT | Performed by: EMERGENCY MEDICINE

## 2018-03-28 PROCEDURE — 99285 EMERGENCY DEPT VISIT HI MDM: CPT

## 2018-03-28 PROCEDURE — 71046 X-RAY EXAM CHEST 2 VIEWS: CPT

## 2018-03-28 PROCEDURE — 83880 ASSAY OF NATRIURETIC PEPTIDE: CPT | Performed by: EMERGENCY MEDICINE

## 2018-03-28 PROCEDURE — 96374 THER/PROPH/DIAG INJ IV PUSH: CPT

## 2018-03-28 PROCEDURE — 80053 COMPREHEN METABOLIC PANEL: CPT | Performed by: EMERGENCY MEDICINE

## 2018-03-28 PROCEDURE — 36415 COLL VENOUS BLD VENIPUNCTURE: CPT | Performed by: EMERGENCY MEDICINE

## 2018-03-28 PROCEDURE — 83735 ASSAY OF MAGNESIUM: CPT | Performed by: EMERGENCY MEDICINE

## 2018-03-28 PROCEDURE — 81001 URINALYSIS AUTO W/SCOPE: CPT

## 2018-03-28 PROCEDURE — 85730 THROMBOPLASTIN TIME PARTIAL: CPT | Performed by: EMERGENCY MEDICINE

## 2018-03-28 RX ORDER — LABETALOL HYDROCHLORIDE 5 MG/ML
10 INJECTION, SOLUTION INTRAVENOUS ONCE
Status: COMPLETED | OUTPATIENT
Start: 2018-03-28 | End: 2018-03-28

## 2018-03-28 RX ADMIN — LABETALOL 20 MG/4 ML (5 MG/ML) INTRAVENOUS SYRINGE 10 MG: at 14:03

## 2018-03-28 NOTE — ED PROVIDER NOTES
History  Chief Complaint   Patient presents with    Chest Pain     Pt presents to ED due to c/o chest pain starting 1000 today, while sitting down in dental chair  Denies SOB, diaphoresis, nausea  PMH: stage 4 lung cancer, a-fib, GERD  68-year-old female comes in complaining of chest pain  States that she has a PET scan tomorrow to evaluate her lung cancer progression and felt a little bit anxious but last night and get some chest tightness that lasted for few minutes  And went away today she was in the dentist's office when she started to feel little bit nervous and started to have the same chest pain  Decided to come to the emergency department to have it evaluated  Patient states that there is no pleuritic component to her chest pain in her chest pain is now resolved  History provided by:  Patient   used: No    Chest Pain   Pain location:  Substernal area  Pain quality: aching    Pain radiates to:  Does not radiate  Pain radiates to the back: no    Pain severity:  Moderate  Onset quality:  Sudden  Duration:  1 day  Timing:  Rare  Progression:  Resolved  Chronicity:  New  Context: not breathing, no movement and no trauma    Relieved by:  Rest  Associated symptoms: anxiety    Associated symptoms: no abdominal pain, no back pain, no cough, no diaphoresis, no fatigue, no fever, no headache, no numbness, no palpitations, no shortness of breath and not vomiting    Risk factors: diabetes mellitus and hypertension        Prior to Admission Medications   Prescriptions Last Dose Informant Patient Reported? Taking?    Albuterol Sulfate (VENTOLIN HFA IN) Past Month at Unknown time  Yes Yes   Sig: Inhale   Linagliptin (TRADJENTA) 5 MG TABS 3/28/2018 at Unknown time  Yes Yes   Sig: Take 5 mg by mouth daily   apixaban (ELIQUIS) 5 mg 3/28/2018 at Unknown time  No Yes   Sig: Take one tablet twice daily   metFORMIN (GLUCOPHAGE) 1000 MG tablet 3/27/2018 at Unknown time  Yes Yes   Sig: Take 1,000 mg by mouth 2 (two) times a day with meals  metoprolol tartrate (LOPRESSOR) 25 mg tablet 3/28/2018 at Unknown time  No Yes   Sig: Take 1 tablet (25 mg total) by mouth every 12 (twelve) hours   omeprazole (PriLOSEC) 20 mg delayed release capsule 3/28/2018 at Unknown time  Yes Yes   Sig: Take 20 mg by mouth daily   sotalol (BETAPACE) 80 mg tablet 3/28/2018 at Unknown time  No Yes   Sig: Take 1 tablet (80 mg total) by mouth every 12 (twelve) hours   venlafaxine (EFFEXOR) 75 mg tablet 3/27/2018 at Unknown time  Yes Yes   Sig: Take 37 5 mg by mouth daily        Facility-Administered Medications: None       Past Medical History:   Diagnosis Date    [facility] fibrillation ([facility] )     Diabetes mellitus ([facility] )     Frozen shoulder     L shoulder    GERD (gastroesophageal reflux disease)     HTN (hypertension)     Hx of cancer of uterus     Hyperlipidemia     Lung mass     diagnosed 9/2016    Stage 4 lung cancer ([facility] )        Past Surgical History:   Procedure Laterality Date    BRONCHOSCOPY N/A 11/17/2016    Procedure: BRONCHOSCOPY FLEXIBLE;  Surgeon: Najma Vincent MD;  [facility];  Service:    Exie Kittery CHOLECYSTECTOMY      HYSTERECTOMY      AR BRONCHOSCOPY NEEDLE BX TRACHEA MAIN STEM&/BRON N/A 11/17/2016    Procedure: EBUS; FROZEN SECTION ;  Surgeon: Najma Vincent MD;  [facility];  Service: Thoracic    AR [address] N/A 5/24/2017    Procedure: BRONCHOSCOPY FLEXIBLE;  Surgeon: Rj Rouse MD;  [facility]; Service: Pulmonary    TONSILECTOMY AND ADNOIDECTOMY      TOTAL KNEE ARTHROPLASTY Right        Family History   Problem Relation Age of Onset    Heart disease Father      I have reviewed and agree with the history as documented      Social History   Substance Use Topics    Smoking status: Former Smoker     Packs/day: 1 00    Smokeless tobacco: Never Used      Comment: Quit in 2000    Alcohol use No        Review of Systems   Constitutional: Negative for diaphoresis, fatigue and fever  HENT: Negative for congestion and ear pain  Eyes: Negative for discharge and redness  Respiratory: Negative for apnea, cough, shortness of breath and wheezing  Cardiovascular: Positive for chest pain  Negative for palpitations  Gastrointestinal: Negative for abdominal pain, diarrhea and vomiting  Endocrine: Negative for cold intolerance and polydipsia  Genitourinary: Negative for difficulty urinating and hematuria  Musculoskeletal: Negative for arthralgias and back pain  Skin: Negative for color change and rash  Allergic/Immunologic: Negative for environmental allergies and immunocompromised state  Neurological: Negative for numbness and headaches  Hematological: Negative for adenopathy  Does not bruise/bleed easily  Psychiatric/Behavioral: Negative for agitation and behavioral problems  Physical Exam  ED Triage Vitals [03/28/18 1257]   Temperature Pulse Respirations Blood Pressure SpO2   98 6 °F (37 °C) 63 18 (!) 199/91 99 %      Temp Source Heart Rate Source Patient Position - Orthostatic VS BP Location FiO2 (%)   Oral Monitor Sitting Right arm --      Pain Score       No Pain           Orthostatic Vital Signs  Vitals:    03/28/18 1257   BP: (!) 199/91   Pulse: 63   Patient Position - Orthostatic VS: Sitting       Physical Exam   Constitutional: She is oriented to person, place, and time  Vital signs are normal  She appears well-developed and well-nourished  Non-toxic appearance  HENT:   Head: Normocephalic and atraumatic  Right Ear: Tympanic membrane and external ear normal    Left Ear: Tympanic membrane and external ear normal    Nose: Nose normal  No rhinorrhea, sinus tenderness or nasal deformity  Mouth/Throat: Uvula is midline and oropharynx is clear and moist  Normal dentition  Eyes: Conjunctivae, EOM and lids are normal  Pupils are equal, round, and reactive to light  Right eye exhibits no discharge  Left eye exhibits no discharge     Neck: Trachea normal and normal range of motion  Neck supple  No JVD present  Carotid bruit is not present  Cardiovascular: Normal rate, regular rhythm, intact distal pulses and normal pulses  No extrasystoles are present  PMI is not displaced  Pulmonary/Chest: Effort normal  No accessory muscle usage  No respiratory distress  She has decreased breath sounds  She has no wheezes  She has no rhonchi  She has no rales  Abdominal: Soft  Normal appearance and bowel sounds are normal  She exhibits no mass  There is no tenderness  There is no rigidity, no rebound and no guarding  Musculoskeletal:        Right shoulder: She exhibits normal range of motion, no bony tenderness, no swelling and no deformity  Cervical back: Normal  She exhibits normal range of motion, no tenderness, no bony tenderness and no deformity  Lymphadenopathy:     She has no cervical adenopathy  She has no axillary adenopathy  Neurological: She is alert and oriented to person, place, and time  She has normal strength and normal reflexes  No cranial nerve deficit or sensory deficit  GCS eye subscore is 4  GCS verbal subscore is 5  GCS motor subscore is 6  Skin: Skin is warm and dry  No rash noted  Psychiatric: She has a normal mood and affect  Her speech is normal and behavior is normal    Nursing note and vitals reviewed        ED Medications  Medications - No data to display    Diagnostic Studies  Results Reviewed     Procedure Component Value Units Date/Time    Magnesium [[id number]]  (Abnormal) Collected:  03/28/18 1308    Lab Status:  Final result Specimen:  Blood from Arm, Left Updated:  03/28/18 1340     Magnesium 1 5 (L) mg/dL     B-type natriuretic peptide [[id number]]  (Abnormal) Collected:  03/28/18 1308    Lab Status:  Final result Specimen:  Blood from Arm, Left Updated:  03/28/18 1340     NT-proBNP 298 (H) pg/mL     Troponin I [[id number]]  (Normal) Collected:  03/28/18 1308    Lab Status:  Final result Specimen:  Blood from Arm, Left Updated:  03/28/18 1334     Troponin I <0 02 ng/mL     Narrative:         [employer] Chemistry analyzer 99% cutoff is > 0 04 ng/mL in network labs    o cTnI 99% cutoff is useful only when applied to patients in the clinical setting of myocardial ischemia  o cTnI 99% cutoff should be interpreted in the context of clinical history, ECG findings and possibly cardiac imaging to establish correct diagnosis  o cTnI 99% cutoff may be suggestive but clearly not indicative of a coronary event without the clinical setting of myocardial ischemia  Comprehensive metabolic panel [[id number]] Collected:  03/28/18 1308    Lab Status:  Final result Specimen:  Blood from Arm, Left Updated:  03/28/18 1333     Sodium 141 mmol/L      Potassium 3 9 mmol/L      Chloride 103 mmol/L      CO2 28 mmol/L      Anion Gap 10 mmol/L      BUN 18 mg/dL      Creatinine 0 83 mg/dL      Glucose 119 mg/dL      Calcium 9 1 mg/dL      AST 23 U/L      ALT 24 U/L      Alkaline Phosphatase 90 U/L      Total Protein 7 2 g/dL      Albumin 3 5 g/dL      Total Bilirubin 0 70 mg/dL      eGFR 72 ml/min/1 73sq m     Narrative:         National Kidney Disease Education Program recommendations are as follows:  GFR calculation is accurate only with a steady state creatinine  Chronic Kidney disease less than 60 ml/min/1 73 sq  meters  Kidney failure less than 15 ml/min/1 73 sq  meters  Protime-INR [[id number]]  (Normal) Collected:  03/28/18 1308    Lab Status:  Final result Specimen:  Blood from Arm, Left Updated:  03/28/18 1325     Protime 14 0 seconds      INR 1 05    APTT [[id number]]  (Normal) Collected:  03/28/18 1308    Lab Status:  Final result Specimen:  Blood from Arm, Left Updated:  03/28/18 1325     PTT 34 seconds     Narrative:          Therapeutic Heparin Range = 60-90 seconds    CBC and differential [[id number]]  (Abnormal) Collected:  03/28/18 1308    Lab Status:  Final result Specimen:  Blood from Arm, Left Updated:  03/28/18 1315     WBC 7 92 Thousand/uL RBC 4 69 Million/uL      Hemoglobin 12 5 g/dL      Hematocrit 38 6 %      MCV 82 fL      MCH 26 7 (L) pg      MCHC 32 4 g/dL      RDW 14 0 %      MPV 11 0 fL      Platelets 161 Thousands/uL      Neutrophils Relative 75 %      Lymphocytes Relative 14 %      Monocytes Relative 7 %      Eosinophils Relative 4 %      Basophils Relative 0 %      Neutrophils Absolute 5 96 Thousands/µL      Lymphocytes Absolute 1 12 Thousands/µL      Monocytes Absolute 0 52 Thousand/µL      Eosinophils Absolute 0 29 Thousand/µL      Basophils Absolute 0 03 Thousands/µL                  XR chest 2 views    (Results Pending)              Procedures  ECG 12 Lead Documentation  Date/Time: 3/28/2018 1:01 PM  Performed by: Starla Alatorre  Authorized by: Christal Pallas K     Patient location:  ED  Rate:     ECG rate:  31  Rhythm:     Rhythm: sinus rhythm    Ectopy:     Ectopy: none             Phone Contacts  ED Phone Contact    ED Course  ED Course          HEART Risk Score    Flowsheet Row Most Recent Value   History  0 Filed at: 03/28/2018 1349   ECG  0 Filed at: 03/28/2018 1349   Age  2 Filed at: 03/28/2018 1349   Risk Factors  2 Filed at: 03/28/2018 1349   Troponin  0 Filed at: 03/28/2018 1349   Heart Score Risk Calculator   History  0 Filed at: 03/28/2018 1349   ECG  0 Filed at: 03/28/2018 1349   Age  2 Filed at: 03/28/2018 1349   Risk Factors  2 Filed at: 03/28/2018 1349   Troponin  0 Filed at: 03/28/2018 1349   HEART Score  4 Filed at: 03/28/2018 1349   HEART Score  4 Filed at: 03/28/2018 1349                            MDM  Number of Diagnoses or Management Options  Chest pain: new and requires workup     Amount and/or Complexity of Data Reviewed  Clinical lab tests: ordered and reviewed  Tests in the radiology section of CPT®: ordered and reviewed  Tests in the medicine section of CPT®: ordered and reviewed  Independent visualization of images, tracings, or specimens: yes    Risk of Complications, Morbidity, and/or Mortality  General comments: Although patient's heart score is 4 her story is not suspicious for ischemic disease is most likely related to her anxiety about her test tomorrow  Also her pain has self resolved  Also the episodes only last a few minutes  Patient knows to return if her chest pain returns and is persistent  Patient Progress  Patient progress: stable    CritCare Time    Disposition  Final diagnoses:   Chest pain     Time reflects when diagnosis was documented in both MDM as applicable and the Disposition within this note     Time User Action Codes Description Comment    3/28/2018  1:49 PM Lanice Post Add [R07 9] Chest pain       ED Disposition     ED Disposition Condition Comment    Discharge  [address] discharge to home/self care  Condition at discharge: Good        Follow-up Information     Follow up With Specialties Details Why Contact Info    Coty Lopez DO Family Medicine Schedule an appointment as soon as possible for a visit  [address] [facility] [address] [facility]   [phone]          Patient's Medications   Discharge Prescriptions    No medications on file     No discharge procedures on file      ED Provider  Electronically Signed by           Meagan Dunn DO  03/28/18 2473

## 2018-03-28 NOTE — DISCHARGE INSTRUCTIONS

## 2018-03-28 NOTE — ED NOTES
Discharge instructions provided to patient  No concerns or questions at this time  Patient able to ambulate without assistance        Sindy Mueller RN  03/28/18 [id number]

## 2018-03-29 ENCOUNTER — HOSPITAL ENCOUNTER (OUTPATIENT)
Dept: RADIOLOGY | Age: 68
Discharge: HOME | End: 2018-03-29
Payer: MEDICARE

## 2018-03-29 VITALS — BODY MASS INDEX: 35.45 KG/M2 | WEIGHT: 213 LBS

## 2018-03-29 DIAGNOSIS — C34.91 MALIGNANT NEOPLASM OF UNSPECIFIED PART OF RIGHT BRONCHUS OR LUNG (HCC): ICD-10-CM

## 2018-03-29 LAB — GLUCOSE SERPL-MCNC: 122 MG/DL (ref 65–140)

## 2018-03-29 PROCEDURE — A9552 F18 FDG: HCPCS

## 2018-03-29 PROCEDURE — 78815 PET IMAGE W/CT SKULL-THIGH: CPT

## 2018-03-29 PROCEDURE — 82948 REAGENT STRIP/BLOOD GLUCOSE: CPT

## 2018-03-29 RX ADMIN — IOHEXOL 5 ML: 240 INJECTION, SOLUTION INTRATHECAL; INTRAVASCULAR; INTRAVENOUS; ORAL at 12:14

## 2018-03-30 LAB
ATRIAL RATE: 61 BPM
P AXIS: 54 DEGREES
PR INTERVAL: 164 MS
QRS AXIS: 18 DEGREES
QRSD INTERVAL: 90 MS
QT INTERVAL: 414 MS
QTC INTERVAL: 416 MS
T WAVE AXIS: 59 DEGREES
VENTRICULAR RATE: 61 BPM

## 2018-03-30 PROCEDURE — 93010 ELECTROCARDIOGRAM REPORT: CPT | Performed by: INTERNAL MEDICINE

## 2018-04-02 ENCOUNTER — OFFICE VISIT (OUTPATIENT)
Dept: CARDIOLOGY CLINIC | Facility: CLINIC | Age: 68
End: 2018-04-02
Payer: MEDICARE

## 2018-04-02 VITALS
HEIGHT: 65 IN | HEART RATE: 63 BPM | DIASTOLIC BLOOD PRESSURE: 80 MMHG | SYSTOLIC BLOOD PRESSURE: 144 MMHG | OXYGEN SATURATION: 98 % | WEIGHT: 218.4 LBS | BODY MASS INDEX: 36.39 KG/M2

## 2018-04-02 DIAGNOSIS — I48.91 ATRIAL FIBRILLATION, UNSPECIFIED TYPE (HCC): Primary | ICD-10-CM

## 2018-04-02 DIAGNOSIS — I10 ESSENTIAL HYPERTENSION: ICD-10-CM

## 2018-04-02 PROCEDURE — 93000 ELECTROCARDIOGRAM COMPLETE: CPT | Performed by: INTERNAL MEDICINE

## 2018-04-02 PROCEDURE — 99214 OFFICE O/P EST MOD 30 MIN: CPT | Performed by: INTERNAL MEDICINE

## 2018-04-02 RX ORDER — OMEPRAZOLE 20 MG/1
1 CAPSULE, DELAYED RELEASE ORAL DAILY
COMMUNITY
Start: 2017-11-13 | End: 2018-12-25 | Stop reason: SDUPTHER

## 2018-04-02 RX ORDER — MULTIVIT-MIN/IRON/FOLIC ACID/K 18-600-40
CAPSULE ORAL
COMMUNITY
Start: 2017-06-20 | End: 2022-02-16

## 2018-04-02 RX ORDER — ACETAMINOPHEN 500 MG
2 TABLET ORAL 2 TIMES DAILY PRN
COMMUNITY
End: 2018-10-16 | Stop reason: ALTCHOICE

## 2018-04-02 RX ORDER — LOSARTAN POTASSIUM 50 MG/1
50 TABLET ORAL DAILY
Qty: 90 TABLET | Refills: 3 | Status: SHIPPED | OUTPATIENT
Start: 2018-04-02 | End: 2018-04-30 | Stop reason: ALTCHOICE

## 2018-04-02 NOTE — PATIENT INSTRUCTIONS
Recommendations:  1  Start Losartan 50mg daily  2  Continue remainder of medications  2  Follow up in 6 months

## 2018-04-02 NOTE — PROGRESS NOTES
Cardiology   [facility] 69 y o  female MRN: [id number]        Impression:  1  Paroxysmal atrial fibrillation - in normal sinus rhythm  On antiarrhythmic medications  On anticoagulation  2  Hypertension - borderline control  Recommendations:  1  Start Losartan 50mg daily  2  Continue remainder of medications  2  Follow up in 6 months  HPI: Itz Fragoso is a 69y o  year old female with hypertension, diabetes, stage IV lung ca, paroxysmal atrial fibrillation, who returns for follow up  Was in the ED last week with some chest pain - appears musculoskeletal, but BP is high  Review of Systems   Constitutional: Negative  HENT: Negative  Eyes: Negative  Respiratory: Negative for chest tightness and shortness of breath  Cardiovascular: Negative for chest pain, palpitations and leg swelling  Gastrointestinal: Negative  Endocrine: Negative  Genitourinary: Negative  Musculoskeletal: Negative  Skin: Negative  Allergic/Immunologic: Negative  Neurological: Negative  Hematological: Negative  Psychiatric/Behavioral: Negative  All other systems reviewed and are negative          Past Medical History:   Diagnosis Date    [facility] fibrillation [facility] )     Diabetes mellitus ([facility] )     Frozen shoulder     L shoulder    GERD (gastroesophageal reflux disease)     HTN (hypertension)     Hx of cancer of uterus     Hyperlipidemia     Lung mass     diagnosed 9/2016    Stage 4 [facility] cancer ([facility] )      Past Surgical History:   Procedure Laterality Date    BRONCHOSCOPY N/A 11/17/2016    Procedure: BRONCHOSCOPY FLEXIBLE;  Surgeon: Chele Gonzalez MD;  [facility];  Service:    Guanako Lilliam CHOLECYSTECTOMY      HYSTERECTOMY      OR BRONCHOSCOPY NEEDLE BX TRACHEA MAIN STEM&/BRON N/A 11/17/2016    Procedure: EBUS; FROZEN SECTION ;  Surgeon: Chele Gonzalez MD;  [facility];  Service: Thoracic    OR [address] N/A 5/24/2017    Procedure: BRONCHOSCOPY FLEXIBLE; Surgeon: Rangel Light MD;  [facility]; Service: Pulmonary    TONSILECTOMY AND ADNOIDECTOMY      TOTAL KNEE ARTHROPLASTY Right      History   Alcohol Use No     History   Drug Use No     History   Smoking Status    Former Smoker    Packs/day: 1 00   Smokeless Tobacco    Never Used     Comment: Quit in 2000     Family History   Problem Relation Age of Onset    Heart disease Father        Allergies: Allergies   Allergen Reactions    Cardizem [Diltiazem]      Rash      Statins Other (See Comments)     Severe muscle aching    Amoxicillin Rash       Medications:     Current Outpatient Prescriptions:     acetaminophen (TYLENOL) 500 mg tablet, Take 2 tablets by mouth 2 (two) times a day, Disp: , Rfl:     Albuterol Sulfate (VENTOLIN HFA IN), Inhale, Disp: , Rfl:     apixaban (ELIQUIS) 5 mg, Take one tablet twice daily, Disp: 180 tablet, Rfl: 3    Calcium Carb-Cholecalciferol 600-800 MG-UNIT TABS, Take 1 tablet by mouth 2 (two) times a day, Disp: , Rfl:     Cholecalciferol (VITAMIN D) 2000 units CAPS, Take by mouth, Disp: , Rfl:     Linagliptin (TRADJENTA) 5 MG TABS, Take 5 mg by mouth daily, Disp: , Rfl:     metFORMIN (GLUCOPHAGE) 1000 MG tablet, Take 1,000 mg by mouth 2 (two) times a day with meals  , Disp: , Rfl:     metoprolol tartrate (LOPRESSOR) 25 mg tablet, Take 1 tablet (25 mg total) by mouth every 12 (twelve) hours, Disp: 180 tablet, Rfl: 3    omeprazole (PriLOSEC) 20 mg delayed release capsule, Take 20 mg by mouth daily, Disp: , Rfl:     omeprazole (PriLOSEC) 20 mg delayed release capsule, Take 1 capsule by mouth daily, Disp: , Rfl:     sotalol (BETAPACE) 80 mg tablet, Take 1 tablet (80 mg total) by mouth every 12 (twelve) hours, Disp: 180 tablet, Rfl: 3    venlafaxine (EFFEXOR) 75 mg tablet, Take 37 5 mg by mouth daily  , Disp: , Rfl:       Wt Readings from Last 3 Encounters:   04/02/18 99 1 kg (218 lb 6 4 oz)   03/29/18 96 6 kg (213 lb)   03/28/18 100 kg (221 lb 4 8 oz)     Temp Readings from Last 3 Encounters:   03/28/18 98 6 °F (37 °C) (Oral)   02/28/18 (!) 97 4 °F (36 3 °C)   12/20/17 (!) 96 3 °F (35 7 °C)     BP Readings from Last 3 Encounters:   04/02/18 144/80   03/28/18 147/66   02/28/18 130/56     Pulse Readings from Last 3 Encounters:   04/02/18 63   03/28/18 60   02/28/18 61         Physical Exam   Constitutional: She is oriented to person, place, and time  She appears well-nourished  Eyes: EOM are normal    Neck: Normal range of motion  Cardiovascular: Normal rate, regular rhythm and normal heart sounds  Exam reveals no gallop and no friction rub  No murmur heard  Pulmonary/Chest: Effort normal and breath sounds normal  No respiratory distress  She has no wheezes  She has no rales  Abdominal: Soft  Musculoskeletal: Normal range of motion  Neurological: She is alert and oriented to person, place, and time  Skin: Skin is warm and dry  Psychiatric: She has a normal mood and affect           Laboratory Studies:  CMP:  Lab Results   Component Value Date     03/28/2018    K 3 9 03/28/2018     03/28/2018    CO2 28 03/28/2018    ANIONGAP 10 03/28/2018    BUN 18 03/28/2018    CREATININE 0 83 03/28/2018    GLUCOSE 119 03/28/2018    AST 23 03/28/2018    ALT 24 03/28/2018    BILITOT 0 70 03/28/2018    EGFR 72 03/28/2018       Lipid Profile:   Lab Results   Component Value Date    CHOL 239 (H) 08/14/2017     Lab Results   Component Value Date    HDL 59 08/14/2017     Lab Results   Component Value Date    LDLCALC 153 (H) 08/14/2017     Lab Results   Component Value Date    TRIG 134 08/14/2017       Cardiac testing:   EKG reviewed personally: NSR 61 [id number]  Results for orders placed during the hospital encounter of 08/27/16   Echo complete with contrast if indicated    Narrative [facility] [facility]  [phone]    Transthoracic Echocardiogram  2D, M-mode, Doppler, and Color Doppler    Study date:  28-Aug-2016    Patient: Dionicio Sanchez  MR number: [id number]  Account number: [de-identified]  : 1950  Age: 74 years  Gender: Female  Status: Inpatient  Location: Bedside  Height: 65 in  Weight: 215 6 lb  BP: 133/ 66 mmHg    Indications: AFIB    Diagnoses: I48 0 - Atrial fibrillation    Sonographer:  Severa Liberty, RCS  Primary Physician:  Clarice Wright DO  Referring Physician:  Amarilys Long MD  Group:  [id number]  Interpreting Physician:  Melvin Mauro MD    SUMMARY    LEFT VENTRICLE:  Systolic function was normal by visual assessment  Ejection fraction was  estimated to be 60 %  There were no regional wall motion abnormalities  Wall thickness was mildly increased  Features were consistent with a pseudonormal left ventricular filling pattern,  with concomitant abnormal relaxation and increased filling pressure (grade 2  diastolic dysfunction)  RIGHT VENTRICLE:  Systolic pressure was mildly increased  Estimated peak pressure was 35 mmHg  TRICUSPID VALVE:  There was mild regurgitation  HISTORY: PRIOR HISTORY: Hyperlipidemia, GERD, Hypertension, Frozen Shoulder,  Diabetes    PROCEDURE: The procedure was performed at the bedside  This was a routine  study  The transthoracic approach was used  The study included complete 2D  imaging, M-mode, complete spectral Doppler, and color Doppler  The heart rate  was 80 bpm, at the start of the study  Images were obtained from the  parasternal, apical, subcostal, and suprasternal notch acoustic windows  Echocardiographic views were limited due to poor acoustic window availability  and lung interference  This was a technically difficult study  LEFT VENTRICLE: Size was normal  Systolic function was normal by visual  assessment  Ejection fraction was estimated to be 60 %  There were no regional  wall motion abnormalities  Wall thickness was mildly increased   DOPPLER: The  ratio of early ventricular filling to atrial contraction velocities was within  the normal range  Features were consistent with a pseudonormal left ventricular  filling pattern, with concomitant abnormal relaxation and increased filling  pressure (grade 2 diastolic dysfunction)  RIGHT VENTRICLE: The size was normal  Systolic function was normal  DOPPLER:  Systolic pressure was mildly increased  Estimated peak pressure was 35 mmHg  LEFT ATRIUM: Size was normal     RIGHT ATRIUM: Size was normal     MITRAL VALVE: Valve structure was normal  There was normal leaflet separation  DOPPLER: The transmitral velocity was within the normal range  There was no  evidence for stenosis  There was no regurgitation  AORTIC VALVE: There was no left ventricular outflow obstruction  The valve was  trileaflet  Leaflets exhibited normal thickness and normal cuspal separation  DOPPLER: Transaortic velocity was within the normal range  There was no  evidence for stenosis  There was no regurgitation  TRICUSPID VALVE: The valve structure was normal  There was normal leaflet  separation  DOPPLER: The transtricuspid velocity was within the normal range  There was no evidence for stenosis  There was mild regurgitation  PULMONIC VALVE: Leaflets exhibited normal thickness, no calcification, and  normal cuspal separation  DOPPLER: The transpulmonic velocity was within the  normal range  There was no regurgitation  PERICARDIUM: There was no pericardial effusion  AORTA: The root exhibited normal size  SYSTEMIC VEINS: IVC: The inferior vena cava was normal in size and course    Respirophasic changes were normal     SYSTEM MEASUREMENT TABLES    2D  %FS: 32 76 %  AV Diam: 2 65 cm  EDV(Teich): 66 2 ml  EF(Teich): 61 86 %  ESV(Teich): 25 25 ml  IVSd: 1 17 cm  LA Area: 14 32 cm2  LA Diam: 3 26 cm  LVEDV MOD A4C: 79 24 ml  LVEF MOD A4C: 67 92 %  LVESV MOD A4C: 25 42 ml  LVIDd: 3 91 cm  LVIDs: 2 63 cm  LVLd A4C: 7 4 cm  LVLs A4C: 6 11 cm  LVPWd: 1 08 cm  RA Area: 12 29 cm2  RVIDd: 2 97 cm  SV MOD A4C: 53 82 ml  SV(Teich): 40 95 ml    CW  TR MaxP 89 mmHg  TR Vmax: 2 82 m/s    MM  TAPSE: 2 03 cm    PW  MV A Eric: 0 75 m/s  MV Dec Shelby: 3 85 m/s2  MV DecT: 204 26 ms  MV E Eric: 0 79 m/s  MV E/A Ratio: 1 05  MV PHT: 59 23 ms  MVA By PHT: 3 71 cm2    [facility] Accredited Echocardiography Laboratory    Prepared and electronically signed by    Guera Hirsch MD  Signed [id number] 12:09:14

## 2018-04-03 ENCOUNTER — OFFICE VISIT (OUTPATIENT)
Dept: HEMATOLOGY ONCOLOGY | Facility: CLINIC | Age: 68
End: 2018-04-03
Payer: MEDICARE

## 2018-04-03 VITALS
HEART RATE: 70 BPM | DIASTOLIC BLOOD PRESSURE: 70 MMHG | BODY MASS INDEX: 36.32 KG/M2 | SYSTOLIC BLOOD PRESSURE: 120 MMHG | OXYGEN SATURATION: 98 % | RESPIRATION RATE: 16 BRPM | TEMPERATURE: 97.1 F | WEIGHT: 218 LBS | HEIGHT: 65 IN

## 2018-04-03 DIAGNOSIS — C34.92 ADENOCARCINOMA OF LEFT LUNG, STAGE 4 (HCC): Primary | ICD-10-CM

## 2018-04-03 PROCEDURE — 99215 OFFICE O/P EST HI 40 MIN: CPT | Performed by: INTERNAL MEDICINE

## 2018-04-03 RX ORDER — PREDNISONE 10 MG/1
10 TABLET ORAL DAILY
Qty: 90 TABLET | Refills: 1 | Status: SHIPPED | OUTPATIENT
Start: 2018-04-03 | End: 2018-10-04 | Stop reason: SDUPTHER

## 2018-04-03 NOTE — LETTER
April 3, 2018     Chelo Duenas, [address] [facility]  [address]  [address] [address]    Patient: Cooper Peterson   YOB: 1950   Date of Visit: 4/3/2018       Dear Dr Waldron Smoker:    Thank you for referring Chino Fermin to me for evaluation  Below are my notes for this consultation  If you have questions, please do not hesitate to call me  I look forward to following your patient along with you           Sincerely,        Jose Ceja MD        CC: MD Jose Borden MD  4/3/2018  4:15 PM  Sign at close encounter  Hematology Outpatient Follow - Up Note  Meryle Hoop Hero 69 y o  female MRN: @ Encounter: [id number]        Date:  4/3/2018        Assessment/ Plan:   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 with scapular involvement of the left scapula, proven by biopsy to be recurrent adenocarcinoma of the lung, negative for EGFR mutation, Ross 1 mutation, ALK gene rearrangement, positive for PD L1 expression of 60 percent She was treated with Pembrolizumab 200 milligram IV flat dose every 3 weeks in December 2016 and finished in May 2017 secondary to grade 3 pneumonitis also she received radiation therapy to the left scapula in October 2017 now with progression of disease in the left scapula as well as left upper lobe of the lung by PET scan, no evidence of other new metastasis   I offered the patient chemotherapy with Alimta /carboplatin or resumption of immunotherapy with differentiation such as nivolumab 240 milligram flat dose every 2 weeks   However provided that we premedicate with prednisone 10 milligram p  O  Daily a week before start nivolumab, I have personal experience with reduction of the incidence of pneumonitis or side effects with low-dose of prednisone, this decision was appealing to the patient because of significant response she had to  Pembrolizumab  Side effects of nivolumab that are but not limited to pneumonitis, adrenal insufficiency, hypothyroidism, dermatitis, hepatitis, nephritis, colitis with told she agree to proceed   Will monitor the patient closely with CBC, CMP, TSH every month and office visit or earlier if symptoms arise  I will ask Radiation Oncology to see if they can add additional radiation therapy to the left scapular area            HPI:  75-year-old  female who was admitted to the hospital with arrhythmia, was found to have right lower lobe infiltrate in August 2016, treated with antibiotics however repeat chest x-ray showed persistent right lower lobe infiltrate, subsequently the patient had a CT scan of the chest showed a right perihilar mass, subcarinal lymphadenopathy, lytic lesion of the right costovertebral junction at T 10 level, /PET scan on October 2016 showed a right perihilar mass measuring 3 5 cm with SUV of 8 9, subcarinal lymph nodes measuring 3 4 x 2 2 cm was SUV of 9 2 nodule in the left upper lobe lung measuring 2 x 1 1 cm, lytic lesion involving the right 10th costovertebral junction with SUV of 14 4 of T10 showed non-small cell carcinoma with features suggesting of adenocarcinoma positive for CK 7, CK 19, CA-19-9, ANEUDY-3, partially positive for P40, p63, negative for TTF-1 had a history of uterine cancer in 2000 status post hysterectomy did not require radiation or chemotherapy status post bilateral nephrectomy, right knee replacement, but a cystectomy, tonsillectomy used to smoke for 35 years 1 pack per day however quit smoking 21 years ago used hormonal replacement therapy for 3 years history significant for skin cancer in her father and coronary artery disease in mother has 2 healthy children had any skin cancer before last mammogram was on June 2015, She is up-to-date with the colonoscopy an OB/GYN exam   Previous Therapy:  Pembrolizumab 200 mg IV flat dose every 3 weeks initiated in December 2016, Finished in May 2017 secondary to grade 3 pneumonitis radiation therapy to the left scapula in October 2017      Interval History:  Mild left scapular pain for the past 2 weeks              Test Results:    Imaging: Xr Chest 2 Views    Result Date: 3/28/2018  Narrative: CHEST INDICATION:   Chest pain  Former smoker  Stage IV lung cancer  COMPARISON:  5/24/2017 EXAM PERFORMED/VIEWS:  XR CHEST PA & LATERAL  The frontal view was performed utilizing dual energy radiographic technique  FINDINGS: Cardiomediastinal silhouette appears stable  Significant improvement in bilateral alveolar opacities with minimal residual opacity noted in the left upper lobe only  Osseous structures appear within normal limits for patient age  Impression: Significant improvement in bilateral alveolar opacities with minimal residual opacity noted in the left upper lobe only  Workstation performed: [id number]     Nm Pet Ct Skull Base To Mid Thigh    Result Date: 3/29/2018  Narrative: PET/CT SCAN INDICATION: C34 91: Malignant neoplasm of unspecified part of right bronchus or lung, restaging for treatment management, history of endometrial cancer, status post RICHARD/BSO 2000 MODIFIER: PS COMPARISON: PET CT 12/8/2017 and priors CELL TYPE:  Adenocarcinoma, T10 bone biopsy 10/18/2016 TECHNIQUE:   8 6 mCi F-18-FDG administered IV  Multiplanar attenuation corrected and non attenuation corrected PET images are available for interpretation, and contiguous, low dose, axial CT sections were obtained from the skull base through the femurs following the administration of oral contrast material (7 5 cc Omnipaque-240 in 300 cc water)  Intravenous contrast material was not utilized  This examination, like all CT scans performed in the [facility], was performed utilizing techniques to minimize radiation dose exposure, including the use of iterative reconstruction and automated exposure control  Fasting serum glucose: 122 mg/dl FINDINGS: VISUALIZED BRAIN:   No acute abnormalities are seen   HEAD/NECK:   There is a physiologic distribution of FDG  No FDG avid cervical adenopathy is seen  CT images: Scattered sinus mucosal thickening  CHEST:   Left upper lung nodule series 3 image 80 demonstrates increased size, density and hypermetabolism, measuring 2 5 x 1 3 cm, SUV 4 2  Prior measurement 2 2 x 1 1 cm, SUV 3 2  This is most concerning for viable tumor  Improving left posterior lateral subpleural hypermetabolic density, SUV 1 9, prior SUV 4  Additional bilateral lung infiltrates demonstrate improvement as well  Stable shotty mediastinal nodes without new hypermetabolism  CT images: Coronary atherosclerosis  ABDOMEN:   No FDG avid soft tissue lesions are seen  CT images: Cholecystectomy  Small hiatal hernia  Colon diverticula  PELVIS: No FDG avid soft tissue lesions are seen  CT images: Stable  OSSEOUS STRUCTURES: Increased size and hypermetabolism of left scapular lytic metastasis, SUV 8 3, prior SUV 5  No new hypermetabolic osseous metastases  CT images: Stable  Impression: 1  Increasing size and hypermetabolism of left upper lung nodule, most concerning for viable tumor  2   Increased size and hypermetabolism of left scapular lytic metastasis  3   Otherwise no new hypermetabolic metastases visualized   Workstation performed: [id number]       Labs:   Lab Results   Component Value Date    WBC 7 92 03/28/2018    HGB 12 5 03/28/2018    HCT 38 6 03/28/2018    MCV 82 03/28/2018     03/28/2018     Lab Results   Component Value Date     03/28/2018    K 3 9 03/28/2018     03/28/2018    CO2 28 03/28/2018    ANIONGAP 10 03/28/2018    BUN 18 03/28/2018    CREATININE 0 83 03/28/2018    GLUCOSE 119 03/28/2018    GLUF 132 (H) 12/08/2017    CALCIUM 9 1 03/28/2018    AST 23 03/28/2018    ALT 24 03/28/2018    ALKPHOS 90 03/28/2018    PROT 7 2 03/28/2018    BILITOT 0 70 03/28/2018    EGFR 72 03/28/2018       No results found for: IRON, TIBC, FERRITIN    No results found for: [id number]      ROS:   Review of Systems Musculoskeletal:         Mild left scapular pain, continuous, relieved by acetaminophen for the past 2 weeks   All other systems reviewed and are negative  Current Medications: Reviewed  Allergies: Reviewed  PMH/FH/SH:  Reviewed      Physical Exam:    Body surface area is 2 05 meters squared  Wt Readings from Last 3 Encounters:   04/03/18 98 9 kg (218 lb)   04/02/18 99 1 kg (218 lb 6 4 oz)   03/29/18 96 6 kg (213 lb)        Temp Readings from Last 3 Encounters:   04/03/18 (!) 97 1 °F (36 2 °C) (Tympanic)   03/28/18 98 6 °F (37 °C) (Oral)   02/28/18 (!) 97 4 °F (36 3 °C)        BP Readings from Last 3 Encounters:   04/03/18 120/70   04/02/18 144/80   03/28/18 147/66         Pulse Readings from Last 3 Encounters:   04/03/18 70   04/02/18 63   03/28/18 60        Physical Exam   Constitutional: She is oriented to person, place, and time  She appears well-developed and well-nourished  No distress  HENT:   Head: Normocephalic and atraumatic  Mouth/Throat: Oropharynx is clear and moist  No oropharyngeal exudate  Eyes: Conjunctivae and EOM are normal  Pupils are equal, round, and reactive to light  Neck: Normal range of motion  Neck supple  No tracheal deviation present  No thyromegaly present  Cardiovascular: Normal rate and regular rhythm  Exam reveals no gallop and no friction rub  No murmur heard  Pulmonary/Chest: Effort normal and breath sounds normal  No respiratory distress  She has no wheezes  She has no rales  She exhibits no tenderness  Abdominal: Soft  Bowel sounds are normal  She exhibits no distension and no mass  There is no tenderness  There is no rebound and no guarding  Musculoskeletal: Normal range of motion  Lymphadenopathy:     She has no cervical adenopathy  Neurological: She is alert and oriented to person, place, and time  Skin: Skin is warm and dry  No rash noted  She is not diaphoretic  No erythema  No pallor  Psychiatric: She has a normal mood and affect   Her behavior is normal  Judgment and thought content normal    Vitals reviewed  Goals and Barriers:  Current Goal: Minimize effects of disease  Barriers: None  Patient's Capacity to Self Care:  Patient is able to self care      Code Status: @[facility]@

## 2018-04-03 NOTE — PROGRESS NOTES
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 with scapular involvement of the left scapula, proven by biopsy to be recurrent adenocarcinoma of the lung, negative for EGFR mutation, Ross 1 mutation, ALK gene rearrangement, positive for PD L1 expression of 60 percent She was treated with Pembrolizumab 200 milligram IV flat dose every 3 weeks in December 2016 and finished in May 2017 secondary to grade 3 pneumonitis also she received radiation therapy to the left scapula in October 2017 now with progression of disease in the left scapula as well as left upper lobe of the lung by PET scan, no evidence of other new metastasis   I offered the patient chemotherapy with Alimta /carboplatin or resumption of immunotherapy with differentiation such as nivolumab 240 milligram flat dose every 2 weeks   However provided that we premedicate with prednisone 10 milligram p  O  Daily a week before start nivolumab, I have personal experience with reduction of the incidence of pneumonitis or side effects with low-dose of prednisone, this decision was appealing to the patient because of significant response she had to  Pembrolizumab  Side effects of nivolumab that are but not limited to pneumonitis, adrenal insufficiency, hypothyroidism, dermatitis, hepatitis, nephritis, colitis with told she agree to proceed   Will monitor the patient closely with CBC, CMP, TSH every month and office visit or earlier if symptoms arise  I will ask Radiation Oncology to see if they can add additional radiation therapy to the left scapular area            HPI:  67-year-old  female who was admitted to the hospital with arrhythmia, was found to have right lower lobe infiltrate in August 2016, treated with antibiotics however repeat chest x-ray showed persistent right lower lobe infiltrate, subsequently the patient had a CT scan of the chest showed a right perihilar mass, subcarinal lymphadenopathy, lytic lesion of the right costovertebral junction at T 10 level, /PET scan on October 2016 showed a right perihilar mass measuring 3 5 cm with SUV of 8 9, subcarinal lymph nodes measuring 3 4 x 2 2 cm was SUV of 9 2 nodule in the left upper lobe lung measuring 2 x 1 1 cm, lytic lesion involving the right 10th costovertebral junction with SUV of 14 4 of T10 showed non-small cell carcinoma with features suggesting of adenocarcinoma positive for CK 7, CK 19, CA-19-9, ANEUDY-3, partially positive for P40, p63, negative for TTF-1 had a history of uterine cancer in 2000 status post hysterectomy did not require radiation or chemotherapy status post bilateral nephrectomy, right knee replacement, but a cystectomy, tonsillectomy used to smoke for 35 years 1 pack per day however quit smoking 21 years ago used hormonal replacement therapy for 3 years history significant for skin cancer in her father and coronary artery disease in mother has 2 healthy children had any skin cancer before last mammogram was on June 2015, She is up-to-date with the colonoscopy an OB/GYN exam   Previous Therapy:  Pembrolizumab 200 mg IV flat dose every 3 weeks initiated in December 2016, Finished in May 2017 secondary to grade 3 pneumonitis radiation therapy to the left scapula in October 2017      Interval History:  Mild left scapular pain for the past 2 weeks              Test Results:    Imaging: Xr Chest 2 Views    Result Date: 3/28/2018  Narrative: CHEST INDICATION:   Chest pain  Former smoker  Stage IV lung cancer  COMPARISON:  5/24/2017 EXAM PERFORMED/VIEWS:  XR CHEST PA & LATERAL  The frontal view was performed utilizing dual energy radiographic technique  FINDINGS: Cardiomediastinal silhouette appears stable   Significant improvement in bilateral alveolar opacities with minimal residual opacity noted in the left upper lobe only  Osseous structures appear within normal limits for patient age  Impression: Significant improvement in bilateral alveolar opacities with minimal residual opacity noted in the left upper lobe only  Workstation performed: [id number]     Nm Pet Ct Skull Base To Mid Thigh    Result Date: 3/29/2018  Narrative: PET/CT SCAN INDICATION: C34 91: Malignant neoplasm of unspecified part of right bronchus or lung, restaging for treatment management, history of endometrial cancer, status post RICHARD/BSO 2000 MODIFIER: PS COMPARISON: PET CT 12/8/2017 and priors CELL TYPE:  Adenocarcinoma, T10 bone biopsy 10/18/2016 TECHNIQUE:   8 6 mCi F-18-FDG administered IV  Multiplanar attenuation corrected and non attenuation corrected PET images are available for interpretation, and contiguous, low dose, axial CT sections were obtained from the skull base through the femurs following the administration of oral contrast material (7 5 cc Omnipaque-240 in 300 cc water)  Intravenous contrast material was not utilized  This examination, like all CT scans performed in the [facility], was performed utilizing techniques to minimize radiation dose exposure, including the use of iterative reconstruction and automated exposure control  Fasting serum glucose: 122 mg/dl FINDINGS: VISUALIZED BRAIN:   No acute abnormalities are seen  HEAD/NECK:   There is a physiologic distribution of FDG  No FDG avid cervical adenopathy is seen  CT images: Scattered sinus mucosal thickening  CHEST:   Left upper lung nodule series 3 image 80 demonstrates increased size, density and hypermetabolism, measuring 2 5 x 1 3 cm, SUV 4 2  Prior measurement 2 2 x 1 1 cm, SUV 3 2  This is most concerning for viable tumor  Improving left posterior lateral subpleural hypermetabolic density, SUV 1 9, prior SUV 4  Additional bilateral lung infiltrates demonstrate improvement as well  Stable shotty mediastinal nodes without new hypermetabolism   CT images: Coronary atherosclerosis  ABDOMEN:   No FDG avid soft tissue lesions are seen  CT images: Cholecystectomy  Small hiatal hernia  Colon diverticula  PELVIS: No FDG avid soft tissue lesions are seen  CT images: Stable  OSSEOUS STRUCTURES: Increased size and hypermetabolism of left scapular lytic metastasis, SUV 8 3, prior SUV 5  No new hypermetabolic osseous metastases  CT images: Stable  Impression: 1  Increasing size and hypermetabolism of left upper lung nodule, most concerning for viable tumor  2   Increased size and hypermetabolism of left scapular lytic metastasis  3   Otherwise no new hypermetabolic metastases visualized  Workstation performed: [id number]       Labs:   Lab Results   Component Value Date    WBC 7 92 03/28/2018    HGB 12 5 03/28/2018    HCT 38 6 03/28/2018    MCV 82 03/28/2018     03/28/2018     Lab Results   Component Value Date     03/28/2018    K 3 9 03/28/2018     03/28/2018    CO2 28 03/28/2018    ANIONGAP 10 03/28/2018    BUN 18 03/28/2018    CREATININE 0 83 03/28/2018    GLUCOSE 119 03/28/2018    GLUF 132 (H) 12/08/2017    CALCIUM 9 1 03/28/2018    AST 23 03/28/2018    ALT 24 03/28/2018    ALKPHOS 90 03/28/2018    PROT 7 2 03/28/2018    BILITOT 0 70 03/28/2018    EGFR 72 03/28/2018       No results found for: IRON, TIBC, FERRITIN    No results found for: [id number]      ROS:   Review of Systems   Musculoskeletal:         Mild left scapular pain, continuous, relieved by acetaminophen for the past 2 weeks   All other systems reviewed and are negative  Current Medications: Reviewed  Allergies: Reviewed  PMH/FH/SH:  Reviewed      Physical Exam:    Body surface area is 2 05 meters squared      Wt Readings from Last 3 Encounters:   04/03/18 98 9 kg (218 lb)   04/02/18 99 1 kg (218 lb 6 4 oz)   03/29/18 96 6 kg (213 lb)        Temp Readings from Last 3 Encounters:   04/03/18 (!) 97 1 °F (36 2 °C) (Tympanic)   03/28/18 98 6 °F (37 °C) (Oral)   02/28/18 (!) 97 4 °F (36 3 °C)        BP Readings from Last 3 Encounters:   04/03/18 120/70   04/02/18 144/80   03/28/18 147/66         Pulse Readings from Last 3 Encounters:   04/03/18 70   04/02/18 63   03/28/18 60        Physical Exam   Constitutional: She is oriented to person, place, and time  She appears well-developed and well-nourished  No distress  HENT:   Head: Normocephalic and atraumatic  Mouth/Throat: Oropharynx is clear and moist  No oropharyngeal exudate  Eyes: Conjunctivae and EOM are normal  Pupils are equal, round, and reactive to light  Neck: Normal range of motion  Neck supple  No tracheal deviation present  No thyromegaly present  Cardiovascular: Normal rate and regular rhythm  Exam reveals no gallop and no friction rub  No murmur heard  Pulmonary/Chest: Effort normal and breath sounds normal  No respiratory distress  She has no wheezes  She has no rales  She exhibits no tenderness  Abdominal: Soft  Bowel sounds are normal  She exhibits no distension and no mass  There is no tenderness  There is no rebound and no guarding  Musculoskeletal: Normal range of motion  Lymphadenopathy:     She has no cervical adenopathy  Neurological: She is alert and oriented to person, place, and time  Skin: Skin is warm and dry  No rash noted  She is not diaphoretic  No erythema  No pallor  Psychiatric: She has a normal mood and affect  Her behavior is normal  Judgment and thought content normal    Vitals reviewed  Goals and Barriers:  Current Goal: Minimize effects of disease  Barriers: None  Patient's Capacity to Self Care:  Patient is able to self care      Code Status: [unfilled]

## 2018-04-04 ENCOUNTER — OFFICE VISIT (OUTPATIENT)
Dept: PALLIATIVE MEDICINE | Facility: HOSPITAL | Age: 68
End: 2018-04-04
Payer: MEDICARE

## 2018-04-04 VITALS
HEART RATE: 61 BPM | TEMPERATURE: 98.4 F | DIASTOLIC BLOOD PRESSURE: 76 MMHG | BODY MASS INDEX: 35.87 KG/M2 | WEIGHT: 215.31 LBS | HEIGHT: 65 IN | SYSTOLIC BLOOD PRESSURE: 158 MMHG | OXYGEN SATURATION: 98 %

## 2018-04-04 DIAGNOSIS — G89.3 CANCER RELATED PAIN: Primary | Chronic | ICD-10-CM

## 2018-04-04 DIAGNOSIS — C34.92 ADENOCARCINOMA OF LEFT LUNG, STAGE 4 (HCC): ICD-10-CM

## 2018-04-04 PROCEDURE — 99214 OFFICE O/P EST MOD 30 MIN: CPT | Performed by: NURSE PRACTITIONER

## 2018-04-04 RX ORDER — HYDROMORPHONE HYDROCHLORIDE 2 MG/1
2 TABLET ORAL EVERY 4 HOURS PRN
Qty: 50 TABLET | Refills: 0 | Status: SHIPPED | OUTPATIENT
Start: 2018-04-04 | End: 2018-05-14 | Stop reason: HOSPADM

## 2018-04-04 NOTE — PROGRESS NOTES
Palliative and Supportive Care   Reyna Griffith 69 y o  female [id number]    Assessment/Plan:  1  Cancer related pain    2  Adenocarcinoma of left lung, stage 4 (HCC)        Requested Prescriptions     Signed Prescriptions Disp Refills    HYDROmorphone (DILAUDID) 2 mg tablet 50 tablet 0     Sig: Take 1 tablet (2 mg total) by mouth every 4 (four) hours as needed for severe pain Max Daily Amount: 12 mg     Cancer pain of left scapular region, related to bone met:  - continue Tylenol if needed  - if pain uncontrolled by Tylenol, start hydromorphone 2mg PO q4h PRN    Previously took oxycodone IR with which she experienced "mental fogginess; will rotate to hydromorphone  Anxiety:   - continue venlafaxine 37 5mg daily      - Follow up in two months or sooner as needed      Subjective    Chief Concern  Follow up visit for:  Metastatic lung cancer, symptoms          History of Present Illness  Patient ID: Renu Serna is a 69 y o  female With 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  Previously on pembrolizumba, discontinued due to pneumonitis  S/p palliative RT to the thoracic spinal met in 2016 and to lesion of left scapular region in 10/2017  Recently found to have progression of left scapular met and of nodule of left upper lobe of lung  She will be starting on nivolumab with low-dose prednisone for prevention of pneumonitis or other side effects  She will see radiation oncology regarding possibility of further palliative radiation to left scapula  Lately has been feeling well aside from very mild pain of left scapular region, which has recently developed  It is currently well controlled with p r n  Tylenol  Otherwise is not having any symptoms  Anxiety has been well controlled overall with venlafaxine  However was in ER 1 week ago with chest pain ultimately determined to be anxiety, this was just prior to her PET scan  She has been sleeping well  Appetite is good    Since her father  last year she has been providing more physical care to her disabled brother who lives with her  The following portions of the patient's history were reviewed and updated as appropriate: allergies, current medications, past family history, past medical history, past social history, past surgical history and problem list       ROS  Review of Systems   Constitutional: Negative  Respiratory: Negative  Gastrointestinal: Negative  Musculoskeletal: Positive for back pain  Psychiatric/Behavioral: Negative  Objective     Physical Exam   Constitutional: She is oriented to person, place, and time  She appears well-developed  She is cooperative  No distress  Cardiovascular: Normal rate  Pulmonary/Chest: Effort normal    Neurological: She is alert and oriented to person, place, and time  Psychiatric: She has a normal mood and affect   Cognition and memory are normal          /76 (BP Location: Right arm, Patient Position: Sitting, Cuff Size: Standard)   Pulse 61   Temp 98 4 °F (36 9 °C) (Oral)   Ht 5' 5" (1 651 m)   Wt 97 7 kg (215 lb 5 oz)   SpO2 98%   BMI 35 83 kg/m²     - ECOG Performance Status: 0      Current Outpatient Prescriptions:     cyanocobalamin (VITAMIN B-12) 100 mcg tablet, Take by mouth daily, Disp: , Rfl:     acetaminophen (TYLENOL) 500 mg tablet, Take 2 tablets by mouth 2 (two) times a day, Disp: , Rfl:     Albuterol Sulfate (VENTOLIN HFA IN), Inhale, Disp: , Rfl:     apixaban (ELIQUIS) 5 mg, Take one tablet twice daily, Disp: 180 tablet, Rfl: 3    Calcium Carb-Cholecalciferol 600-800 MG-UNIT TABS, Take 1 tablet by mouth 2 (two) times a day, Disp: , Rfl:     Cholecalciferol (VITAMIN D) 2000 units CAPS, Take by mouth, Disp: , Rfl:     HYDROmorphone (DILAUDID) 2 mg tablet, Take 1 tablet (2 mg total) by mouth every 4 (four) hours as needed for severe pain Max Daily Amount: 12 mg, Disp: 50 tablet, Rfl: 0    Linagliptin (TRADJENTA) 5 MG TABS, Take 5 mg by mouth daily, Disp: , Rfl:     losartan (COZAAR) 50 mg tablet, Take 1 tablet (50 mg total) by mouth daily, Disp: 90 tablet, Rfl: 3    metFORMIN (GLUCOPHAGE) 1000 MG tablet, Take 1,000 mg by mouth 2 (two) times a day with meals  , Disp: , Rfl:     metoprolol tartrate (LOPRESSOR) 25 mg tablet, Take 1 tablet (25 mg total) by mouth every 12 (twelve) hours, Disp: 180 tablet, Rfl: 3    omeprazole (PriLOSEC) 20 mg delayed release capsule, Take 1 capsule by mouth daily, Disp: , Rfl:     predniSONE 10 mg tablet, Take 1 tablet (10 mg total) by mouth daily, Disp: 90 tablet, Rfl: 1    sotalol (BETAPACE) 80 mg tablet, Take 1 tablet (80 mg total) by mouth every 12 (twelve) hours, Disp: 180 tablet, Rfl: 3    venlafaxine (EFFEXOR) 75 mg tablet, Take 37 5 mg by mouth daily  , Disp: , Rfl:       Stephanie Phillips, [address] [facility] [address] and Supportive Care  [phone]      Representatives have queried the patient's controlled substance dispensing history in the Prescription Drug Monitoring Program in compliance with the [facility] regulations before I have prescribed any controlled substances  The prescription history is consistent with prescribed therapy and our practice policies

## 2018-04-07 RX ORDER — UBIDECARENONE 75 MG
CAPSULE ORAL DAILY
COMMUNITY

## 2018-04-08 NOTE — PATIENT INSTRUCTIONS
Continue Tylenol as needed for pain  If pain is uncontrolled, start taking hydromorphone as needed for pain  Follow up with radiation-oncology to determine if further radiation is an option  Continue venlafaxine  Call with questions or concerns

## 2018-04-09 RX ORDER — SODIUM CHLORIDE 9 MG/ML
20 INJECTION, SOLUTION INTRAVENOUS CONTINUOUS
Status: DISCONTINUED | OUTPATIENT
Start: 2018-04-10 | End: 2018-04-13 | Stop reason: HOSPADM

## 2018-04-10 ENCOUNTER — HOSPITAL ENCOUNTER (OUTPATIENT)
Dept: INFUSION CENTER | Facility: CLINIC | Age: 68
Discharge: HOME | End: 2018-04-10
Payer: MEDICARE

## 2018-04-10 VITALS
DIASTOLIC BLOOD PRESSURE: 68 MMHG | WEIGHT: 214 LBS | SYSTOLIC BLOOD PRESSURE: 122 MMHG | HEART RATE: 60 BPM | RESPIRATION RATE: 18 BRPM | TEMPERATURE: 97.7 F | OXYGEN SATURATION: 98 % | BODY MASS INDEX: 36.54 KG/M2 | HEIGHT: 64 IN

## 2018-04-10 PROCEDURE — 96413 CHEMO IV INFUSION 1 HR: CPT

## 2018-04-10 RX ADMIN — SODIUM CHLORIDE 240 MG: 9 INJECTION, SOLUTION INTRAVENOUS at 11:41

## 2018-04-10 RX ADMIN — SODIUM CHLORIDE 20 ML/HR: 0.9 INJECTION, SOLUTION INTRAVENOUS at 11:21

## 2018-04-11 ENCOUNTER — TELEPHONE (OUTPATIENT)
Dept: CARDIOLOGY CLINIC | Facility: CLINIC | Age: 68
End: 2018-04-11

## 2018-04-11 NOTE — TELEPHONE ENCOUNTER
Pt has started w/a dry cough  Pt asking if it may be from Cozaar? Pt also started chemo yesterday and is not sure if it may a side effect from that?

## 2018-04-23 RX ORDER — SODIUM CHLORIDE 9 MG/ML
20 INJECTION, SOLUTION INTRAVENOUS CONTINUOUS
Status: DISCONTINUED | OUTPATIENT
Start: 2018-04-24 | End: 2018-04-27 | Stop reason: HOSPADM

## 2018-04-24 ENCOUNTER — HOSPITAL ENCOUNTER (OUTPATIENT)
Dept: INFUSION CENTER | Facility: CLINIC | Age: 68
Discharge: HOME | End: 2018-04-24
Payer: MEDICARE

## 2018-04-24 VITALS
SYSTOLIC BLOOD PRESSURE: 136 MMHG | TEMPERATURE: 98.6 F | OXYGEN SATURATION: 97 % | HEART RATE: 59 BPM | DIASTOLIC BLOOD PRESSURE: 62 MMHG | RESPIRATION RATE: 18 BRPM

## 2018-04-24 PROCEDURE — 96413 CHEMO IV INFUSION 1 HR: CPT

## 2018-04-24 RX ADMIN — SODIUM CHLORIDE 20 ML/HR: 0.9 INJECTION, SOLUTION INTRAVENOUS at 11:30

## 2018-04-24 RX ADMIN — SODIUM CHLORIDE 240 MG: 9 INJECTION, SOLUTION INTRAVENOUS at 12:08

## 2018-04-24 NOTE — PROGRESS NOTES
Pt resting with no complaints  Vitals stable; labs not needed for treatment per order  Callbell within reach; will continue to monitor

## 2018-04-30 ENCOUNTER — OFFICE VISIT (OUTPATIENT)
Dept: FAMILY MEDICINE CLINIC | Facility: CLINIC | Age: 68
End: 2018-04-30
Payer: MEDICARE

## 2018-04-30 VITALS
RESPIRATION RATE: 18 BRPM | DIASTOLIC BLOOD PRESSURE: 70 MMHG | BODY MASS INDEX: 37.25 KG/M2 | SYSTOLIC BLOOD PRESSURE: 134 MMHG | HEIGHT: 64 IN | WEIGHT: 218.2 LBS | HEART RATE: 64 BPM

## 2018-04-30 DIAGNOSIS — E55.9 VITAMIN D DEFICIENCY: ICD-10-CM

## 2018-04-30 DIAGNOSIS — I48.91 ATRIAL FIBRILLATION, UNSPECIFIED TYPE (HCC): ICD-10-CM

## 2018-04-30 DIAGNOSIS — E11.9 TYPE 2 DIABETES MELLITUS WITHOUT COMPLICATION, WITHOUT LONG-TERM CURRENT USE OF INSULIN (HCC): Primary | ICD-10-CM

## 2018-04-30 DIAGNOSIS — C41.9 METASTATIC BONE CANCER (HCC): ICD-10-CM

## 2018-04-30 DIAGNOSIS — I10 BENIGN ESSENTIAL HYPERTENSION: ICD-10-CM

## 2018-04-30 DIAGNOSIS — E78.5 HYPERLIPIDEMIA, UNSPECIFIED HYPERLIPIDEMIA TYPE: ICD-10-CM

## 2018-04-30 PROBLEM — M75.00 ADHESIVE CAPSULITIS OF SHOULDER: Status: ACTIVE | Noted: 2018-04-30

## 2018-04-30 PROCEDURE — 99214 OFFICE O/P EST MOD 30 MIN: CPT | Performed by: FAMILY MEDICINE

## 2018-04-30 NOTE — PROGRESS NOTES
Assessment/Plan:    Problem List Items Addressed This Visit     Atrial fibrillation ([facility] )     Cont eliquis for now  Seeing cards         Benign essential hypertension    Relevant Orders    TSH, 3rd generation with T4 reflex    Diabetes mellitus type 2, uncomplicated ([facility] ) - Primary    Relevant Orders    Comprehensive metabolic panel    HEMOGLOBIN A1C W/ EAG ESTIMATION    Microalbumin / creatinine urine ratio    Diabetic foot exam    Metastatic bone cancer ([facility] )     Cont immunotherapy with heme/onc         Hyperlipidemia    Relevant Orders    Lipid Panel with Direct LDL reflex    TSH, 3rd generation with T4 reflex    Vitamin D deficiency     Cont over the counter d3               There are no Patient Instructions on file for this visit  No Follow-up on file  Subjective:      Patient ID: Ligia Krishna is a 69 y o  female  Chief Complaint   Patient presents with    Follow-up     Patient here for follow up on Diabetes, Hypertension, Hyponatremia       Here for follow up  Had er visit for chest pain, bp was very high  Cardiologist added losartan which was d/c by pt due to cough  Had labs done at hospital  a1c 7 0      Diabetes   She presents for her follow-up diabetic visit  She has type 2 diabetes mellitus  Her disease course has been stable  There are no hypoglycemic associated symptoms  Pertinent negatives for hypoglycemia include no headaches or sweats  There are no diabetic associated symptoms  Pertinent negatives for diabetes include no blurred vision and no chest pain  There are no hypoglycemic complications  Symptoms are stable  There are no diabetic complications  Risk factors for coronary artery disease include dyslipidemia and hypertension  Current diabetic treatment includes oral agent (dual therapy)  She is compliant with treatment all of the time  She is following a diabetic diet  There is no change in her home blood glucose trend  Hypertension   This is a chronic problem   The current episode started more than 1 year ago  The problem has been gradually improving since onset  The problem is controlled  Pertinent negatives include no anxiety, blurred vision, chest pain, headaches, malaise/fatigue, neck pain, orthopnea, palpitations, peripheral edema, PND, shortness of breath or sweats  There are no associated agents to hypertension  Risk factors for coronary artery disease include diabetes mellitus and dyslipidemia  Past treatments include beta blockers  The current treatment provides mild improvement  The following portions of the patient's history were reviewed and updated as appropriate: allergies, current medications, past family history, past medical history, past social history, past surgical history and problem list     Review of Systems   Constitutional: Negative  Negative for malaise/fatigue  HENT: Negative  Eyes: Negative for blurred vision  Respiratory: Negative for shortness of breath  Cardiovascular: Negative for chest pain, palpitations, orthopnea and PND  Endocrine: Negative  Genitourinary: Negative  Musculoskeletal: Positive for joint swelling (shoulder pain)  Negative for neck pain  Allergic/Immunologic: Negative  Neurological: Negative for headaches           Current Outpatient Prescriptions   Medication Sig Dispense Refill    acetaminophen (TYLENOL) 500 mg tablet Take 2 tablets by mouth 2 (two) times a day      Albuterol Sulfate (VENTOLIN HFA IN) Inhale      apixaban (ELIQUIS) 5 mg Take one tablet twice daily 180 tablet 3    Calcium Carb-Cholecalciferol 600-800 MG-UNIT TABS Take 1 tablet by mouth 2 (two) times a day      Cholecalciferol (VITAMIN D) 2000 units CAPS Take by mouth      cyanocobalamin (VITAMIN B-12) 100 mcg tablet Take by mouth daily      HYDROmorphone (DILAUDID) 2 mg tablet Take 1 tablet (2 mg total) by mouth every 4 (four) hours as needed for severe pain Max Daily Amount: 12 mg 50 tablet 0    Linagliptin (TRADJENTA) 5 MG TABS Take 5 mg by mouth daily      metFORMIN (GLUCOPHAGE) 1000 MG tablet Take 1,000 mg by mouth 2 (two) times a day with meals   metoprolol tartrate (LOPRESSOR) 25 mg tablet Take 1 tablet (25 mg total) by mouth every 12 (twelve) hours 180 tablet 3    omeprazole (PriLOSEC) 20 mg delayed release capsule Take 1 capsule by mouth daily      predniSONE 10 mg tablet Take 1 tablet (10 mg total) by mouth daily 90 tablet 1    sotalol (BETAPACE) 80 mg tablet Take 1 tablet (80 mg total) by mouth every 12 (twelve) hours 180 tablet 3    venlafaxine (EFFEXOR) 75 mg tablet Take 37 5 mg by mouth daily         No current facility-administered medications for this visit  Objective:    /70   Pulse 64   Resp 18   Ht 5' 3 9" (1 623 m)   Wt 99 kg (218 lb 3 2 oz)   LMP  (LMP Unknown)   BMI 37 57 kg/m²        Physical Exam   Constitutional: She appears well-developed and well-nourished  Eyes: Pupils are equal, round, and reactive to light  Neck: Normal range of motion  Neck supple  Cardiovascular: Normal rate, regular rhythm, normal heart sounds and intact distal pulses  Pulses are no weak pulses  Pulses:       Dorsalis pedis pulses are 2+ on the right side, and 2+ on the left side  Posterior tibial pulses are 2+ on the right side, and 2+ on the left side  Pulmonary/Chest: Effort normal and breath sounds normal    Abdominal: Soft  Bowel sounds are normal    Feet:   Right Foot: amputated  Skin Integrity: Negative for ulcer, skin breakdown, erythema, warmth, callus or dry skin  Left Foot: amputated  Skin Integrity: Negative for ulcer, skin breakdown, erythema, warmth, callus or dry skin  Neurological: She is alert  Skin: Skin is warm and dry  Psychiatric: She has a normal mood and affect  Her behavior is normal  Judgment and thought content normal    Nursing note and vitals reviewed  Patient's shoes and socks removed  Right Foot/Ankle   Right Foot Inspection  Skin Exam: skin normal and skin intact no dry skin, no warmth, no callus, no erythema, no maceration, no abnormal color, no pre-ulcer, no ulcer and no callus                        Amputation: amputation right foot   Toe Exam: ROM and strength within normal limits  Sensory   Vibration: intact  Proprioception: intact   Monofilament testing: intact  Vascular  Capillary refills: < 3 seconds  The right DP pulse is 2+  The right PT pulse is 2+  Left Foot/Ankle  Left Foot Inspection  Skin Exam: skin normal and skin intactno dry skin, no warmth, no erythema, no maceration, normal color, no pre-ulcer, no ulcer and no callus                       Amputation: amputation left foot   Toe Exam: ROM and strength within normal limits                   Sensory   Vibration: intact  Proprioception: intact  Monofilament: intact  Vascular  Capillary refills: < 3 seconds  The left DP pulse is 2+  The left PT pulse is 2+  Assign Risk Category:  Deformity present; No loss of protective sensation;  No weak pulses       Risk: 0         Cyrus Ok, DO

## 2018-05-01 DIAGNOSIS — E78.5 HYPERLIPIDEMIA: ICD-10-CM

## 2018-05-01 DIAGNOSIS — I10 ESSENTIAL (PRIMARY) HYPERTENSION: ICD-10-CM

## 2018-05-01 DIAGNOSIS — E11.9 TYPE 2 DIABETES MELLITUS WITHOUT COMPLICATIONS (HCC): ICD-10-CM

## 2018-05-01 DIAGNOSIS — E55.9 VITAMIN D DEFICIENCY: ICD-10-CM

## 2018-05-04 ENCOUNTER — APPOINTMENT (OUTPATIENT)
Dept: LAB | Facility: CLINIC | Age: 68
End: 2018-05-04
Payer: MEDICARE

## 2018-05-04 ENCOUNTER — TELEPHONE (OUTPATIENT)
Dept: PALLIATIVE MEDICINE | Facility: HOSPITAL | Age: 68
End: 2018-05-04

## 2018-05-04 ENCOUNTER — TRANSCRIBE ORDERS (OUTPATIENT)
Dept: LAB | Facility: CLINIC | Age: 68
End: 2018-05-04

## 2018-05-04 DIAGNOSIS — F41.9 ANXIETY: Primary | ICD-10-CM

## 2018-05-04 DIAGNOSIS — C34.92 ADENOCARCINOMA OF LEFT LUNG, STAGE 4 (HCC): ICD-10-CM

## 2018-05-04 LAB
ALBUMIN SERPL BCP-MCNC: 3.2 G/DL (ref 3.5–5)
ALP SERPL-CCNC: 78 U/L (ref 46–116)
ALT SERPL W P-5'-P-CCNC: 25 U/L (ref 12–78)
ANION GAP SERPL CALCULATED.3IONS-SCNC: 8 MMOL/L (ref 4–13)
AST SERPL W P-5'-P-CCNC: 16 U/L (ref 5–45)
BILIRUB SERPL-MCNC: 0.4 MG/DL (ref 0.2–1)
BUN SERPL-MCNC: 24 MG/DL (ref 5–25)
CALCIUM SERPL-MCNC: 8.9 MG/DL (ref 8.3–10.1)
CHLORIDE SERPL-SCNC: 104 MMOL/L (ref 100–108)
CO2 SERPL-SCNC: 30 MMOL/L (ref 21–32)
CREAT SERPL-MCNC: 0.83 MG/DL (ref 0.6–1.3)
GFR SERPL CREATININE-BSD FRML MDRD: 72 ML/MIN/1.73SQ M
GLUCOSE P FAST SERPL-MCNC: 127 MG/DL (ref 65–99)
POTASSIUM SERPL-SCNC: 4.1 MMOL/L (ref 3.5–5.3)
PROT SERPL-MCNC: 6.7 G/DL (ref 6.4–8.2)
SODIUM SERPL-SCNC: 142 MMOL/L (ref 136–145)
TSH SERPL DL<=0.05 MIU/L-ACNC: 1.29 UIU/ML (ref 0.36–3.74)

## 2018-05-04 PROCEDURE — 80053 COMPREHEN METABOLIC PANEL: CPT

## 2018-05-04 PROCEDURE — 84443 ASSAY THYROID STIM HORMONE: CPT

## 2018-05-07 RX ORDER — VENLAFAXINE 75 MG/1
37.5 TABLET ORAL DAILY
Qty: 30 TABLET | Refills: 2 | Status: SHIPPED | OUTPATIENT
Start: 2018-05-07 | End: 2018-05-07

## 2018-05-07 RX ORDER — SODIUM CHLORIDE 9 MG/ML
20 INJECTION, SOLUTION INTRAVENOUS CONTINUOUS
Status: DISCONTINUED | OUTPATIENT
Start: 2018-05-08 | End: 2018-05-11 | Stop reason: HOSPADM

## 2018-05-07 RX ORDER — VENLAFAXINE 75 MG/1
37.5 TABLET ORAL DAILY
Qty: 45 TABLET | Refills: 1 | Status: SHIPPED | OUTPATIENT
Start: 2018-05-07 | End: 2018-11-29 | Stop reason: SDUPTHER

## 2018-05-08 ENCOUNTER — OFFICE VISIT (OUTPATIENT)
Dept: HEMATOLOGY ONCOLOGY | Facility: CLINIC | Age: 68
End: 2018-05-08
Payer: MEDICARE

## 2018-05-08 ENCOUNTER — HOSPITAL ENCOUNTER (OUTPATIENT)
Dept: INFUSION CENTER | Facility: CLINIC | Age: 68
Discharge: HOME | End: 2018-05-08
Payer: MEDICARE

## 2018-05-08 VITALS
WEIGHT: 216.5 LBS | TEMPERATURE: 97.8 F | DIASTOLIC BLOOD PRESSURE: 66 MMHG | HEIGHT: 64 IN | RESPIRATION RATE: 16 BRPM | BODY MASS INDEX: 36.96 KG/M2 | HEART RATE: 56 BPM | SYSTOLIC BLOOD PRESSURE: 120 MMHG | OXYGEN SATURATION: 97 %

## 2018-05-08 VITALS
RESPIRATION RATE: 16 BRPM | TEMPERATURE: 98 F | HEART RATE: 84 BPM | SYSTOLIC BLOOD PRESSURE: 140 MMHG | BODY MASS INDEX: 36.85 KG/M2 | DIASTOLIC BLOOD PRESSURE: 80 MMHG | WEIGHT: 214 LBS

## 2018-05-08 DIAGNOSIS — C34.92 ADENOCARCINOMA OF LEFT LUNG, STAGE 4 (HCC): Primary | ICD-10-CM

## 2018-05-08 DIAGNOSIS — C41.9 METASTATIC BONE CANCER (HCC): ICD-10-CM

## 2018-05-08 PROCEDURE — 99214 OFFICE O/P EST MOD 30 MIN: CPT | Performed by: INTERNAL MEDICINE

## 2018-05-08 PROCEDURE — 96413 CHEMO IV INFUSION 1 HR: CPT

## 2018-05-08 RX ADMIN — SODIUM CHLORIDE 20 ML/HR: 0.9 INJECTION, SOLUTION INTRAVENOUS at 11:01

## 2018-05-08 RX ADMIN — SODIUM CHLORIDE 240 MG: 9 INJECTION, SOLUTION INTRAVENOUS at 11:00

## 2018-05-08 NOTE — PROGRESS NOTES
Hematology Outpatient Follow - Up Note  Meryle Hoop Hero 69 y o  female MRN: @ Encounter: [id number]        Date:  5/8/2018        Assessment/ Plan:   Metastatic adenocarcinoma of the lung primary in the right upper lobe, subcarinal lymph nodes involvement as well as left scapula as mentioned in the history of present illness, molecular test were positive for PD L1 expression of 60% however negative for  mutations, she was treated initially with Pembrolizumab with good response and later on radiation therapy to the left scapula  She had progression of disease in April 2018 with increased uptake in the left scapula as well as increase in the primary right lower lobe lung mass after a break of immunotherapy for almost 6 months  Currently on nivolumab 240 mg  Flat dose every 2 weeks initiated in April 2018, proceed with dose 3  After dose 6 a CT scan of the chest is ordered  Prednisone 10 mg p o   Daily to prevent pneumonitis induced by immunotherapy  She had pneumonitis while she was on Pembrolizumab  No evidence of hypothyroidism  If she has progression of disease will arrange for Alimta/carboplatin           HPI: 75-year-old  female who was admitted to the hospital with arrhythmia, was found to have right lower lobe infiltrate in August 2016, treated with antibiotics however repeat chest x-ray showed persistent right lower lobe infiltrate, subsequently the patient had a CT scan of the chest showed a right perihilar mass, subcarinal lymphadenopathy, lytic lesion of the right costovertebral junction at T 10 level, /PET scan on October 2016 showed a right perihilar mass measuring 3 5 cm with SUV of 8 9, subcarinal lymph nodes measuring 3 4 x 2 2 cm was SUV of 9 2 nodule in the left upper lobe lung measuring 2 x 1 1 cm, lytic lesion involving the right 10th costovertebral junction with SUV of 14 4 of T10 showed non-small cell carcinoma with features suggesting of adenocarcinoma positive for CK 7, CK 19, CA-19-9, ANEUDY-3, partially positive for P40, p63, negative for TTF-1 had a history of uterine cancer in 2000 status post hysterectomy did not require radiation or chemotherapy status post bilateral nephrectomy, right knee replacement, but a cystectomy, tonsillectomy used to smoke for 35 years 1 pack per day however quit smoking 21 years ago used hormonal replacement therapy for 3 years history significant for skin cancer in her father and coronary artery disease in mother has 2 healthy children had any skin cancer before last mammogram was on June 2015, She is up-to-date with the colonoscopy an OB/GYN exam   Previous Therapy:  Pembrolizumab 200 mg IV flat dose every 3 weeks initiated in December 2016, Finished in May 2017 secondary to grade 3 pneumonitis radiation therapy to the left scapula in October 2017        ECOG score 0       current treatment nivolumab 240 mg flat dose every 2 weeks initiated in April 2018 secondary to progression of disease in the lung and in the left scapular  Prednisone 10 mg p o  Daily to prevent pneumonitis induced by immunotherapy         molecular test PD L1 expression of 60%, negative for EGFR mutation, Ross 1 mutation, ALK gene rearrangement    Imaging: No results found      Labs:   Lab Results   Component Value Date    WBC 5 88 05/04/2018    HGB 12 4 05/04/2018    HCT 38 6 05/04/2018    MCV 84 05/04/2018     05/04/2018     Lab Results   Component Value Date     05/04/2018    K 4 1 05/04/2018     05/04/2018    CO2 30 05/04/2018    ANIONGAP 8 05/04/2018    BUN 24 05/04/2018    CREATININE 0 83 05/04/2018    GLUCOSE 119 03/28/2018    GLUF 127 (H) 05/04/2018    CALCIUM 8 9 05/04/2018    AST 16 05/04/2018    ALT 25 05/04/2018    ALKPHOS 78 05/04/2018    PROT 6 7 05/04/2018    BILITOT 0 40 05/04/2018    EGFR 72 05/04/2018       No results found for: IRON, TIBC, FERRITIN    No results found for: [id number]      ROS:   Review of Systems   Constitutional: Negative for activity change, appetite change, diaphoresis, fatigue, fever and unexpected weight change  HENT: Negative for facial swelling, hearing loss, rhinorrhea, sinus pain, sinus pressure, sneezing, sore throat and tinnitus  Eyes: Negative for photophobia, pain, discharge, redness, itching and visual disturbance  Respiratory: Negative for apnea and chest tightness  Cardiovascular: Negative for chest pain, palpitations and leg swelling  Gastrointestinal: Negative for abdominal distention, abdominal pain, blood in stool, constipation, diarrhea, nausea, rectal pain and vomiting  Endocrine: Negative for cold intolerance, heat intolerance, polydipsia and polyphagia  Genitourinary: Negative for difficulty urinating, dyspareunia, frequency, hematuria, pelvic pain and urgency  Musculoskeletal: Negative for arthralgias, back pain, gait problem, joint swelling and myalgias  Skin: Negative for color change, pallor and rash  Allergic/Immunologic: Negative for environmental allergies and food allergies  Neurological: Negative for dizziness, tremors, seizures, syncope, speech difficulty, numbness and headaches  Hematological: Negative for adenopathy  Does not bruise/bleed easily  Psychiatric/Behavioral: Negative for agitation, confusion, dysphoric mood, hallucinations and suicidal ideas  Current Medications: Reviewed  Allergies: Reviewed  PMH/FH/SH:  Reviewed      Physical Exam:    Body surface area is 2 01 meters squared      Wt Readings from Last 3 Encounters:   05/08/18 97 1 kg (214 lb)   04/30/18 99 kg (218 lb 3 2 oz)   04/10/18 97 1 kg (214 lb)        Temp Readings from Last 3 Encounters:   05/08/18 98 °F (36 7 °C)   04/24/18 98 6 °F (37 °C) (Oral)   04/10/18 97 7 °F (36 5 °C) (Oral)        BP Readings from Last 3 Encounters:   05/08/18 140/80   04/30/18 134/70   04/24/18 136/62         Pulse Readings from Last 3 Encounters:   05/08/18 84   04/30/18 64   04/24/18 59        Physical Exam Constitutional: She is oriented to person, place, and time  She appears well-developed and well-nourished  No distress  HENT:   Head: Normocephalic and atraumatic  Mouth/Throat: Oropharynx is clear and moist  No oropharyngeal exudate  Eyes: Conjunctivae and EOM are normal  Pupils are equal, round, and reactive to light  Neck: Normal range of motion  Neck supple  No tracheal deviation present  No thyromegaly present  Cardiovascular: Normal rate and regular rhythm  Exam reveals no gallop and no friction rub  No murmur heard  Pulmonary/Chest: Effort normal and breath sounds normal  No respiratory distress  She has no wheezes  She has no rales  She exhibits no tenderness  Abdominal: Soft  Bowel sounds are normal  She exhibits no distension and no mass  There is no tenderness  There is no rebound and no guarding  Musculoskeletal: Normal range of motion  Lymphadenopathy:     She has no cervical adenopathy  Neurological: She is alert and oriented to person, place, and time  Skin: Skin is warm and dry  No rash noted  She is not diaphoretic  No erythema  No pallor  Psychiatric: She has a normal mood and affect  Her behavior is normal  Judgment and thought content normal    Vitals reviewed  Goals and Barriers:  Current Goal: Minimize effects of disease  Barriers: None  Patient's Capacity to Self Care:  Patient is able to self care      Code Status: [unfilled]

## 2018-05-08 NOTE — PROGRESS NOTES
Patient here for infusion of opdivo  Patient currently without complaints  VSS, labs within parameters to treat  Peripheral IV inserted in right arm without incidence

## 2018-05-08 NOTE — LETTER
May 8, 2018     Corinne Stade, [address] [address]  [address] [address]    Patient: Jennifer Rodriguez   YOB: 1950   Date of Visit: 5/8/2018       Dear Dr Clarice Duong:    Thank you for referring Palma Lee to me for evaluation  Below are my notes for this consultation  If you have questions, please do not hesitate to call me  I look forward to following your patient along with you  Sincerely,        Kalani Garrido MD        CC: Lola Robledo, Lyudmila Manzo MD  5/8/2018  8:56 AM  Sign at close encounter  Hematology Outpatient Follow - Up Note  Amy Griffith 69 y o  female MRN: @ Encounter: [id number]        Date:  5/8/2018        Assessment/ Plan:   Metastatic adenocarcinoma of the lung primary in the right upper lobe, subcarinal lymph nodes involvement as well as left scapula as mentioned in the history of present illness, molecular test were positive for PD L1 expression of 60% however negative for  mutations, she was treated initially with Pembrolizumab with good response and later on radiation therapy to the left scapula  She had progression of disease in April 2018 with increased uptake in the left scapula as well as increase in the primary right lower lobe lung mass after a break of immunotherapy for almost 6 months  Currently on nivolumab 240 mg  Flat dose every 2 weeks initiated in April 2018, proceed with dose 3  After dose 6 a CT scan of the chest is ordered  Prednisone 10 mg p o   Daily to prevent pneumonitis induced by immunotherapy  She had pneumonitis while she was on Pembrolizumab  No evidence of hypothyroidism  If she has progression of disease will arrange for Alimta/carboplatin           HPI: 58-year-old  female who was admitted to the hospital with arrhythmia, was found to have right lower lobe infiltrate in August 2016, treated with antibiotics however repeat chest x-ray showed persistent right lower lobe infiltrate, subsequently the patient had a CT scan of the chest showed a right perihilar mass, subcarinal lymphadenopathy, lytic lesion of the right costovertebral junction at T 10 level, /PET scan on October 2016 showed a right perihilar mass measuring 3 5 cm with SUV of 8 9, subcarinal lymph nodes measuring 3 4 x 2 2 cm was SUV of 9 2 nodule in the left upper lobe lung measuring 2 x 1 1 cm, lytic lesion involving the right 10th costovertebral junction with SUV of 14 4 of T10 showed non-small cell carcinoma with features suggesting of adenocarcinoma positive for CK 7, CK 19, CA-19-9, ANEUDY-3, partially positive for P40, p63, negative for TTF-1 had a history of uterine cancer in 2000 status post hysterectomy did not require radiation or chemotherapy status post bilateral nephrectomy, right knee replacement, but a cystectomy, tonsillectomy used to smoke for 35 years 1 pack per day however quit smoking 21 years ago used hormonal replacement therapy for 3 years history significant for skin cancer in her father and coronary artery disease in mother has 2 healthy children had any skin cancer before last mammogram was on June 2015, She is up-to-date with the colonoscopy an OB/GYN exam   Previous Therapy:  Pembrolizumab 200 mg IV flat dose every 3 weeks initiated in December 2016, Finished in May 2017 secondary to grade 3 pneumonitis radiation therapy to the left scapula in October 2017        ECOG score 0       current treatment nivolumab 240 mg flat dose every 2 weeks initiated in April 2018 secondary to progression of disease in the lung and in the left scapular  Prednisone 10 mg p o  Daily to prevent pneumonitis induced by immunotherapy         molecular test PD L1 expression of 60%, negative for EGFR mutation, Ross 1 mutation, ALK gene rearrangement    Imaging: No results found      Labs:   Lab Results   Component Value Date    WBC 5 88 05/04/2018    HGB 12 4 05/04/2018    HCT 38 6 05/04/2018    MCV 84 05/04/2018     05/04/2018     Lab Results   Component Value Date     05/04/2018    K 4 1 05/04/2018     05/04/2018    CO2 30 05/04/2018    ANIONGAP 8 05/04/2018    BUN 24 05/04/2018    CREATININE 0 83 05/04/2018    GLUCOSE 119 03/28/2018    GLUF 127 (H) 05/04/2018    CALCIUM 8 9 05/04/2018    AST 16 05/04/2018    ALT 25 05/04/2018    ALKPHOS 78 05/04/2018    PROT 6 7 05/04/2018    BILITOT 0 40 05/04/2018    EGFR 72 05/04/2018       No results found for: IRON, TIBC, FERRITIN    No results found for: [id number]      ROS:   Review of Systems   Constitutional: Negative for activity change, appetite change, diaphoresis, fatigue, fever and unexpected weight change  HENT: Negative for facial swelling, hearing loss, rhinorrhea, sinus pain, sinus pressure, sneezing, sore throat and tinnitus  Eyes: Negative for photophobia, pain, discharge, redness, itching and visual disturbance  Respiratory: Negative for apnea and chest tightness  Cardiovascular: Negative for chest pain, palpitations and leg swelling  Gastrointestinal: Negative for abdominal distention, abdominal pain, blood in stool, constipation, diarrhea, nausea, rectal pain and vomiting  Endocrine: Negative for cold intolerance, heat intolerance, polydipsia and polyphagia  Genitourinary: Negative for difficulty urinating, dyspareunia, frequency, hematuria, pelvic pain and urgency  Musculoskeletal: Negative for arthralgias, back pain, gait problem, joint swelling and myalgias  Skin: Negative for color change, pallor and rash  Allergic/Immunologic: Negative for environmental allergies and food allergies  Neurological: Negative for dizziness, tremors, seizures, syncope, speech difficulty, numbness and headaches  Hematological: Negative for adenopathy  Does not bruise/bleed easily  Psychiatric/Behavioral: Negative for agitation, confusion, dysphoric mood, hallucinations and suicidal ideas           Current Medications: Reviewed  Allergies: Reviewed  PMH/FH/SH: Reviewed      Physical Exam:    Body surface area is 2 01 meters squared  Wt Readings from Last 3 Encounters:   05/08/18 97 1 kg (214 lb)   04/30/18 99 kg (218 lb 3 2 oz)   04/10/18 97 1 kg (214 lb)        Temp Readings from Last 3 Encounters:   05/08/18 98 °F (36 7 °C)   04/24/18 98 6 °F (37 °C) (Oral)   04/10/18 97 7 °F (36 5 °C) (Oral)        BP Readings from Last 3 Encounters:   05/08/18 140/80   04/30/18 134/70   04/24/18 136/62         Pulse Readings from Last 3 Encounters:   05/08/18 84   04/30/18 64   04/24/18 59        Physical Exam   Constitutional: She is oriented to person, place, and time  She appears well-developed and well-nourished  No distress  HENT:   Head: Normocephalic and atraumatic  Mouth/Throat: Oropharynx is clear and moist  No oropharyngeal exudate  Eyes: Conjunctivae and EOM are normal  Pupils are equal, round, and reactive to light  Neck: Normal range of motion  Neck supple  No tracheal deviation present  No thyromegaly present  Cardiovascular: Normal rate and regular rhythm  Exam reveals no gallop and no friction rub  No murmur heard  Pulmonary/Chest: Effort normal and breath sounds normal  No respiratory distress  She has no wheezes  She has no rales  She exhibits no tenderness  Abdominal: Soft  Bowel sounds are normal  She exhibits no distension and no mass  There is no tenderness  There is no rebound and no guarding  Musculoskeletal: Normal range of motion  Lymphadenopathy:     She has no cervical adenopathy  Neurological: She is alert and oriented to person, place, and time  Skin: Skin is warm and dry  No rash noted  She is not diaphoretic  No erythema  No pallor  Psychiatric: She has a normal mood and affect  Her behavior is normal  Judgment and thought content normal    Vitals reviewed  Goals and Barriers:  Current Goal: Minimize effects of disease  Barriers: None  Patient's Capacity to Self Care:  Patient is able to self care      Code Status: @[facility]@

## 2018-05-14 ENCOUNTER — OFFICE VISIT (OUTPATIENT)
Dept: PULMONOLOGY | Facility: CLINIC | Age: 68
End: 2018-05-14
Payer: MEDICARE

## 2018-05-14 VITALS
TEMPERATURE: 97.4 F | SYSTOLIC BLOOD PRESSURE: 132 MMHG | WEIGHT: 220.4 LBS | HEIGHT: 65 IN | HEART RATE: 61 BPM | DIASTOLIC BLOOD PRESSURE: 64 MMHG | OXYGEN SATURATION: 96 % | BODY MASS INDEX: 36.72 KG/M2

## 2018-05-14 DIAGNOSIS — C34.92 ADENOCARCINOMA OF LEFT LUNG, STAGE 4 (HCC): Primary | ICD-10-CM

## 2018-05-14 PROCEDURE — 99213 OFFICE O/P EST LOW 20 MIN: CPT | Performed by: INTERNAL MEDICINE

## 2018-05-14 NOTE — PROGRESS NOTES
Progress note - Pulmonary Medicine   Galo Tiffanie Nacho 69 y o  female MRN: [id number]       Impression & Plan:     Adenocarcinoma of lung, stage 4 ([address] )  Clinically doing fine and tolerating her new immunotherapy agent well (on Opdivo) , will monitor for any worsening of symptoms of shortness of Breath to evaluate for possible pneumonitis since she had reaction in the past with immunotherapy (with Gladystine Cardinal)  Will continue prednisone 10 mg daily  We will see in the next few months  She will have a follow-up PET scan in the next 1-2 months to evaluate her therapy response by her oncologist   Patient gained weight and she attributes that to prednisone  We spoke about some lifestyle modification by decreasing calories and exercising more to try to lose weight or at least avoid gaining more weight  Otherwise she is doing fine and coping well with her cancer  She is very pleasant to see all the time  Will follow in 6 months or earlier if needed  ______________________________________________________________________    HPI:    Coty Hunt presents today for follow-up of lung cancer and history of drug-induced pneumonitis with immunotherapy last year  Patient is a very pleasant lady with stage IV non-small cell lung cancer with metastasis to the left scapula who had radiation therapy for that, initially treated with pembrolizumab which caused acute pneumonitis treated with steroids successfully and she is currently off oxygen with no shortness of breath or dyspnea on exertion  She had 6 months off any cancer therapy, then a repeat PET scan showed increased activity in her scapula which was treated radiation and also increased activity in the left lung nodule    She was started recently on nivolumab by her oncologist Dr Binh Jernigan with 10 mg of prednisone daily as a prophylaxis for pneumonitis, she already had 3 cycles and after the 6 I called she is planned to have PET scan, if no response then the next line will be Altima/carboplatin as per Oncology note  Patient feels fine, she has some mild pain in her left scapula with movement, she denies any chest pain or fever or chills or shortness of breath, denies any dyspnea on exertion, denies any wheezing  She gained weight since he was started on prednisone  Otherwise she feels fine  She takes care of her disabled brother at home  Review of Systems:  Review of Systems   Constitutional: Negative  HENT: Negative  Eyes: Negative  Respiratory:        As HPI   Cardiovascular: Negative  Gastrointestinal: Negative  Endocrine: Negative  Genitourinary: Negative  Musculoskeletal: Negative  Skin: Negative  Allergic/Immunologic: Negative  Neurological: Negative  Hematological: Negative  Psychiatric/Behavioral: Negative  Past medical history, surgical history, and family history were reviewed and updated as appropriate    Social history updates:  History   Smoking Status    Former Smoker    Packs/day: 1 00    Years: 50 00    Types: Cigarettes    Start date: 1962    Quit date: 2012   Smokeless Tobacco    Never Used     Comment: Quit in 2000       PhysicalExamination:  Vitals:   /64 (BP Location: Left arm, Patient Position: Sitting)   Pulse 61   Temp (!) 97 4 °F (36 3 °C) (Tympanic)   Ht 5' 5" (1 651 m)   Wt 100 kg (220 lb 6 4 oz)   LMP  (LMP Unknown)   SpO2 96%   BMI 36 68 kg/m²     General: alert, not in acute distress  HEENT: PERRL, no icteric sclera or cyanosis, no thrush  Neck:  Supple, no lymphadenopathy or thyromegaly, no JVD  Lungs:  Equal breath sounds and clear auscultations bilaterally, no wheezing or crackles  Heart: S1S2 regular, no murmures or gallops  Abdomen: soft, non-tender, bowel sounds  present  Extrimities: no edema, no clubbing or cyanosis  Neuro: Alert and oriented x 3, no focal neurodeficits   Skin: intact, no rashes    Diagnostic Data:  Labs:   I personally reviewed the most recent laboratory data pertinent to today's visit    Lab Results   Component Value Date    WBC 5 88 05/04/2018    HGB 12 4 05/04/2018    HCT 38 6 05/04/2018    MCV 84 05/04/2018     05/04/2018     Lab Results   Component Value Date    GLUCOSE 119 03/28/2018    CALCIUM 8 9 05/04/2018     05/04/2018    K 4 1 05/04/2018    CO2 30 05/04/2018     05/04/2018    BUN 24 05/04/2018    CREATININE 0 83 05/04/2018     No results found for: IGE  Lab Results   Component Value Date    ALT 25 05/04/2018    AST 16 05/04/2018    ALKPHOS 78 05/04/2018    BILITOT 0 40 05/04/2018       PFT results: The most recent pulmonary function tests were reviewed  PFTs in 2017 showed normal spirometry with no obstruction or restriction, normal lung volumes, moderately to severely reduced diffusion capacity which improved slightly on subsequent evaluation  Imaging:  I personally reviewed the images on the [facility] system pertinent to today's visit  PET scan reviewed on PACs: IMPRESSION:  1  Increasing size and hypermetabolism of left upper lung nodule, most concerning for viable tumor  2   Increased size and hypermetabolism of left scapular lytic metastasis    3   Otherwise no new hypermetabolic metastases visualized    Freddy Morocho MD

## 2018-05-14 NOTE — ASSESSMENT & PLAN NOTE
Clinically doing fine and tolerating her new immunotherapy agent well (on Opdivo) , will monitor for any worsening of symptoms of shortness of Breath to evaluate for possible pneumonitis since she had reaction in the past with immunotherapy (with Keytruda)  Will continue prednisone 10 mg daily  We will see in the next few months  She will have a follow-up PET scan in the next 1-2 months to evaluate her therapy response by her oncologist   Patient gained weight and she attributes that to prednisone  We spoke about some lifestyle modification by decreasing calories and exercising more to try to lose weight or at least avoid gaining more weight  Otherwise she is doing fine and coping well with her cancer  She is very pleasant to see all the time  Will follow in 6 months or earlier if needed

## 2018-05-21 RX ORDER — SODIUM CHLORIDE 9 MG/ML
20 INJECTION, SOLUTION INTRAVENOUS CONTINUOUS
Status: DISCONTINUED | OUTPATIENT
Start: 2018-05-22 | End: 2018-05-25 | Stop reason: HOSPADM

## 2018-05-22 ENCOUNTER — HOSPITAL ENCOUNTER (OUTPATIENT)
Dept: INFUSION CENTER | Facility: CLINIC | Age: 68
Discharge: HOME | End: 2018-05-22
Payer: MEDICARE

## 2018-05-22 VITALS
TEMPERATURE: 99.1 F | HEART RATE: 61 BPM | DIASTOLIC BLOOD PRESSURE: 72 MMHG | OXYGEN SATURATION: 96 % | RESPIRATION RATE: 18 BRPM | SYSTOLIC BLOOD PRESSURE: 122 MMHG | HEIGHT: 64 IN

## 2018-05-22 PROCEDURE — 96413 CHEMO IV INFUSION 1 HR: CPT

## 2018-05-22 RX ADMIN — SODIUM CHLORIDE 20 ML/HR: 0.9 INJECTION, SOLUTION INTRAVENOUS at 11:20

## 2018-05-22 RX ADMIN — SODIUM CHLORIDE 240 MG: 9 INJECTION, SOLUTION INTRAVENOUS at 11:45

## 2018-05-22 NOTE — PROGRESS NOTES
Patient to Angela for [address]:  Offers no complaints at present time: Lab work ( 05/04/18 ) reviewed: Labs only needed prior to office visit: Left Hand PIV initiated without incident

## 2018-06-04 RX ORDER — SODIUM CHLORIDE 9 MG/ML
20 INJECTION, SOLUTION INTRAVENOUS CONTINUOUS
Status: DISCONTINUED | OUTPATIENT
Start: 2018-06-05 | End: 2018-06-08 | Stop reason: HOSPADM

## 2018-06-05 ENCOUNTER — TELEPHONE (OUTPATIENT)
Dept: HEMATOLOGY ONCOLOGY | Facility: CLINIC | Age: 68
End: 2018-06-05

## 2018-06-05 ENCOUNTER — HOSPITAL ENCOUNTER (OUTPATIENT)
Dept: INFUSION CENTER | Facility: CLINIC | Age: 68
Discharge: HOME | End: 2018-06-05
Payer: MEDICARE

## 2018-06-05 VITALS
WEIGHT: 219 LBS | SYSTOLIC BLOOD PRESSURE: 132 MMHG | TEMPERATURE: 98.4 F | HEART RATE: 54 BPM | OXYGEN SATURATION: 93 % | RESPIRATION RATE: 18 BRPM | DIASTOLIC BLOOD PRESSURE: 58 MMHG | BODY MASS INDEX: 37.39 KG/M2

## 2018-06-05 PROCEDURE — 96413 CHEMO IV INFUSION 1 HR: CPT

## 2018-06-05 RX ADMIN — SODIUM CHLORIDE 20 ML/HR: 0.9 INJECTION, SOLUTION INTRAVENOUS at 11:30

## 2018-06-05 RX ADMIN — SODIUM CHLORIDE 240 MG: 9 INJECTION, SOLUTION INTRAVENOUS at 12:36

## 2018-06-05 NOTE — PROGRESS NOTES
Pt offers no complaints, last labs 5/4/18, per patient she is to have labs done only before MD visits, next visit 7/6/18

## 2018-06-05 NOTE — TELEPHONE ENCOUNTER
Jose Ryan RN called to verify when labs due for pt with Opdivo  D/W Dr Nathaly Warren and he wants them every 6 weeks

## 2018-06-05 NOTE — PROGRESS NOTES
5/4/18 labs reviewed with Chelsea Zhang RN, Per Dr Cotton Model labs to be done q6wks  Patient aware to have labs prior to next Opdivo    Ok to treat today with labs from 5/4/18

## 2018-06-11 ENCOUNTER — APPOINTMENT (OUTPATIENT)
Dept: LAB | Facility: CLINIC | Age: 68
End: 2018-06-11
Payer: MEDICARE

## 2018-06-11 ENCOUNTER — TRANSCRIBE ORDERS (OUTPATIENT)
Dept: LAB | Facility: CLINIC | Age: 68
End: 2018-06-11

## 2018-06-11 DIAGNOSIS — C41.9 METASTATIC BONE CANCER (HCC): ICD-10-CM

## 2018-06-11 DIAGNOSIS — C34.92 ADENOCARCINOMA OF LEFT LUNG, STAGE 4 (HCC): ICD-10-CM

## 2018-06-11 LAB
ALBUMIN SERPL BCP-MCNC: 3.1 G/DL (ref 3.5–5)
ALP SERPL-CCNC: 80 U/L (ref 46–116)
ALT SERPL W P-5'-P-CCNC: 32 U/L (ref 12–78)
ANION GAP SERPL CALCULATED.3IONS-SCNC: 9 MMOL/L (ref 4–13)
AST SERPL W P-5'-P-CCNC: 25 U/L (ref 5–45)
BASOPHILS # BLD AUTO: 0.03 THOUSANDS/ΜL (ref 0–0.1)
BASOPHILS NFR BLD AUTO: 1 % (ref 0–1)
BILIRUB SERPL-MCNC: 0.4 MG/DL (ref 0.2–1)
BUN SERPL-MCNC: 19 MG/DL (ref 5–25)
CALCIUM SERPL-MCNC: 9.2 MG/DL (ref 8.3–10.1)
CHLORIDE SERPL-SCNC: 103 MMOL/L (ref 100–108)
CO2 SERPL-SCNC: 28 MMOL/L (ref 21–32)
CREAT SERPL-MCNC: 0.84 MG/DL (ref 0.6–1.3)
EOSINOPHIL # BLD AUTO: 0.16 THOUSAND/ΜL (ref 0–0.61)
EOSINOPHIL NFR BLD AUTO: 3 % (ref 0–6)
ERYTHROCYTE [DISTWIDTH] IN BLOOD BY AUTOMATED COUNT: 13.4 % (ref 11.6–15.1)
GFR SERPL CREATININE-BSD FRML MDRD: 71 ML/MIN/1.73SQ M
GLUCOSE P FAST SERPL-MCNC: 153 MG/DL (ref 65–99)
HCT VFR BLD AUTO: 37.9 % (ref 34.8–46.1)
HGB BLD-MCNC: 12.2 G/DL (ref 11.5–15.4)
LYMPHOCYTES # BLD AUTO: 0.77 THOUSANDS/ΜL (ref 0.6–4.47)
LYMPHOCYTES NFR BLD AUTO: 13 % (ref 14–44)
MCH RBC QN AUTO: 26.9 PG (ref 26.8–34.3)
MCHC RBC AUTO-ENTMCNC: 32.2 G/DL (ref 31.4–37.4)
MCV RBC AUTO: 84 FL (ref 82–98)
MONOCYTES # BLD AUTO: 0.4 THOUSAND/ΜL (ref 0.17–1.22)
MONOCYTES NFR BLD AUTO: 7 % (ref 4–12)
NEUTROPHILS # BLD AUTO: 4.38 THOUSANDS/ΜL (ref 1.85–7.62)
NEUTS SEG NFR BLD AUTO: 76 % (ref 43–75)
PLATELET # BLD AUTO: 220 THOUSANDS/UL (ref 149–390)
PMV BLD AUTO: 10.4 FL (ref 8.9–12.7)
POTASSIUM SERPL-SCNC: 4.2 MMOL/L (ref 3.5–5.3)
PROT SERPL-MCNC: 6.6 G/DL (ref 6.4–8.2)
RBC # BLD AUTO: 4.54 MILLION/UL (ref 3.81–5.12)
SODIUM SERPL-SCNC: 140 MMOL/L (ref 136–145)
TSH SERPL DL<=0.05 MIU/L-ACNC: 1.54 UIU/ML (ref 0.36–3.74)
WBC # BLD AUTO: 5.74 THOUSAND/UL (ref 4.31–10.16)

## 2018-06-11 PROCEDURE — 80053 COMPREHEN METABOLIC PANEL: CPT

## 2018-06-11 PROCEDURE — 84443 ASSAY THYROID STIM HORMONE: CPT

## 2018-06-11 PROCEDURE — 85025 COMPLETE CBC W/AUTO DIFF WBC: CPT

## 2018-06-11 PROCEDURE — 36415 COLL VENOUS BLD VENIPUNCTURE: CPT

## 2018-06-13 ENCOUNTER — OFFICE VISIT (OUTPATIENT)
Dept: PALLIATIVE MEDICINE | Facility: HOSPITAL | Age: 68
End: 2018-06-13
Payer: MEDICARE

## 2018-06-13 VITALS
BODY MASS INDEX: 38.62 KG/M2 | HEART RATE: 64 BPM | TEMPERATURE: 98.2 F | RESPIRATION RATE: 18 BRPM | DIASTOLIC BLOOD PRESSURE: 58 MMHG | WEIGHT: 218 LBS | OXYGEN SATURATION: 98 % | HEIGHT: 63 IN | SYSTOLIC BLOOD PRESSURE: 117 MMHG

## 2018-06-13 DIAGNOSIS — R11.0 NAUSEA: ICD-10-CM

## 2018-06-13 DIAGNOSIS — G89.3 CANCER RELATED PAIN: Chronic | ICD-10-CM

## 2018-06-13 DIAGNOSIS — F41.9 ANXIETY: Primary | ICD-10-CM

## 2018-06-13 DIAGNOSIS — C34.92 ADENOCARCINOMA OF LEFT LUNG, STAGE 4 (HCC): ICD-10-CM

## 2018-06-13 DIAGNOSIS — F32.A MILD DEPRESSION: ICD-10-CM

## 2018-06-13 PROCEDURE — 99214 OFFICE O/P EST MOD 30 MIN: CPT | Performed by: NURSE PRACTITIONER

## 2018-06-13 RX ORDER — ONDANSETRON 4 MG/1
4 TABLET, ORALLY DISINTEGRATING ORAL EVERY 6 HOURS PRN
Refills: 0
Start: 2018-06-13 | End: 2018-10-16 | Stop reason: ALTCHOICE

## 2018-06-13 NOTE — PATIENT INSTRUCTIONS
Call to let me know where to send refill for venlafaxine  Call if pain, nausea, or other symptoms worsen  Return in 3 months or sooner as needed

## 2018-06-13 NOTE — PROGRESS NOTES
Palliative and Supportive Care   Hayden Griffith 69 y o  female [id number]    Assessment/Plan:  1  Anxiety    2  Cancer related pain    3  Adenocarcinoma of left lung, stage 4 ([address] )    4  Nausea    5  Mild depression (HCC)          Cancer pain of left scapular region, related to bone met:  - continue Tylenol   - if pain uncontrolled by Tylenol, fill previous prescription for hydromorphone PO PRN        Anxiety:   - continue venlafaxine 37 5mg daily    Nausea:    Patient has antiemetic at home and is not in need of refill; this is most likely ondansetron ODT  Call if nausea is uncontrolled        - Follow up in three months or sooner as needed      Subjective    Chief Concern  Follow up visit for:  Symptom management, metastatic lung cancer         History of Present Illness  Patient ID: Yamilet Gerard is a 69 y o  female with 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  Previously on pembrolizumab, discontinued due to pneumonitis  S/p palliative RT to the thoracic spinal met in 12/2016 and to lesion of left scapular region in 10/2017  Recently found to have progression of left scapular met and of nodule of left upper lobe of lung  Has started on nivolumab with low-dose prednisone for prevention of pneumonitis or other side effects  She has been tolerating this well  Did note delayed nausea which improved with p r n  ondansetron  She continues to have very mild pain in the left scapula, which is manageable with Tylenol which she takes twice daily  She did not fill the prescription for hydromorphone as she did not need it  Her pain is exacerbated by the fact that she is primary caregiver for her disabled brother and provides physical care to him  She has otherwise been feeling well  Mood has been good, and anxiety is well managed with venlafaxine  She is sleeping well  Appetite good          The following portions of the patient's history were reviewed and updated as appropriate: allergies, current medications, past family history, past medical history, past social history, past surgical history and problem list       Visit Information    Accompanied By: No one  Source of History: Self  History Limitations: None    ROS  Review of Systems   Constitutional: Negative  Musculoskeletal: Positive for back pain  Psychiatric/Behavioral: Negative  Objective     Physical Exam   Constitutional: She is oriented to person, place, and time  She appears well-developed and well-nourished  She is cooperative  Pulmonary/Chest: Effort normal    Neurological: She is alert and oriented to person, place, and time  Skin: Skin is warm and dry  Psychiatric: She has a normal mood and affect  Cognition and memory are normal          /58 (BP Location: Right arm, Patient Position: Sitting, Cuff Size: Standard)   Pulse 64   Temp 98 2 °F (36 8 °C) (Oral)   Resp 18   Ht 5' 3" (1 6 m)   Wt 98 9 kg (218 lb)   LMP  (LMP Unknown)   SpO2 98%   BMI 38 62 kg/m²       - ECOG Performance Status: 0      Current Outpatient Prescriptions:     acetaminophen (TYLENOL) 500 mg tablet, Take 2 tablets by mouth 2 (two) times a day, Disp: , Rfl:     Albuterol Sulfate (VENTOLIN HFA IN), Inhale, Disp: , Rfl:     apixaban (ELIQUIS) 5 mg, Take one tablet twice daily, Disp: 180 tablet, Rfl: 3    Calcium Carb-Cholecalciferol 600-800 MG-UNIT TABS, Take 1 tablet by mouth 2 (two) times a day, Disp: , Rfl:     Cholecalciferol (VITAMIN D) 2000 units CAPS, Take by mouth, Disp: , Rfl:     cyanocobalamin (VITAMIN B-12) 100 mcg tablet, Take by mouth daily, Disp: , Rfl:     Linagliptin (TRADJENTA) 5 MG TABS, Take 5 mg by mouth daily, Disp: , Rfl:     metFORMIN (GLUCOPHAGE) 1000 MG tablet, Take 1,000 mg by mouth 2 (two) times a day with meals  , Disp: , Rfl:     metoprolol tartrate (LOPRESSOR) 25 mg tablet, Take 1 tablet (25 mg total) by mouth every 12 (twelve) hours, Disp: 180 tablet, Rfl: 3    omeprazole (PriLOSEC) 20 mg delayed release capsule, Take 1 capsule by mouth daily, Disp: , Rfl:     ondansetron (ZOFRAN-ODT) 4 mg disintegrating tablet, Take 1 tablet (4 mg total) by mouth every 6 (six) hours as needed for nausea or vomiting, Disp: , Rfl: 0    predniSONE 10 mg tablet, Take 1 tablet (10 mg total) by mouth daily, Disp: 90 tablet, Rfl: 1    sotalol (BETAPACE) 80 mg tablet, Take 1 tablet (80 mg total) by mouth every 12 (twelve) hours, Disp: 180 tablet, Rfl: 3    venlafaxine (EFFEXOR) 75 mg tablet, Take 0 5 tablets (37 5 mg total) by mouth daily, Disp: 45 tablet, Rfl: [address]  [phone]        Representatives have queried the patient's controlled substance dispensing history in the Prescription Drug Monitoring Program in compliance with the [facility] regulations before I have prescribed any controlled substances  The prescription history is consistent with prescribed therapy and our practice policies

## 2018-06-18 RX ORDER — SODIUM CHLORIDE 9 MG/ML
20 INJECTION, SOLUTION INTRAVENOUS CONTINUOUS
Status: DISCONTINUED | OUTPATIENT
Start: 2018-06-19 | End: 2018-06-22 | Stop reason: HOSPADM

## 2018-06-19 ENCOUNTER — HOSPITAL ENCOUNTER (OUTPATIENT)
Dept: INFUSION CENTER | Facility: CLINIC | Age: 68
Discharge: HOME | End: 2018-06-19
Payer: MEDICARE

## 2018-06-19 VITALS
OXYGEN SATURATION: 98 % | HEIGHT: 65 IN | DIASTOLIC BLOOD PRESSURE: 70 MMHG | HEART RATE: 63 BPM | BODY MASS INDEX: 36.49 KG/M2 | WEIGHT: 219 LBS | RESPIRATION RATE: 16 BRPM | SYSTOLIC BLOOD PRESSURE: 120 MMHG | TEMPERATURE: 98 F

## 2018-06-19 PROCEDURE — 96413 CHEMO IV INFUSION 1 HR: CPT

## 2018-06-19 RX ADMIN — SODIUM CHLORIDE 240 MG: 9 INJECTION, SOLUTION INTRAVENOUS at 12:28

## 2018-06-19 RX ADMIN — SODIUM CHLORIDE 20 ML/HR: 0.9 INJECTION, SOLUTION INTRAVENOUS at 12:10

## 2018-06-19 NOTE — PROGRESS NOTES
Pt here for Avastin  Offers no complaints  No labs needed as per order  Labs only q 6 weeks  Most recent labs done 6/11/18  PIV inserted without difficulty  Positive blood return, flushes well

## 2018-06-19 NOTE — PROGRESS NOTES
Pt tolerated infusion well  Offers no complaints  PIV removed  No bleeding noted to site  Aware of future appointments    Declined AVS

## 2018-06-26 ENCOUNTER — HOSPITAL ENCOUNTER (OUTPATIENT)
Dept: CT IMAGING | Facility: HOSPITAL | Age: 68
Discharge: HOME | End: 2018-06-26
Attending: INTERNAL MEDICINE
Payer: MEDICARE

## 2018-06-26 DIAGNOSIS — C41.9 METASTATIC BONE CANCER (HCC): ICD-10-CM

## 2018-06-26 DIAGNOSIS — C34.92 ADENOCARCINOMA OF LEFT LUNG, STAGE 4 (HCC): ICD-10-CM

## 2018-06-26 PROCEDURE — 71260 CT THORAX DX C+: CPT

## 2018-06-26 RX ADMIN — IOHEXOL 85 ML: 350 INJECTION, SOLUTION INTRAVENOUS at 10:28

## 2018-07-02 RX ORDER — SODIUM CHLORIDE 9 MG/ML
20 INJECTION, SOLUTION INTRAVENOUS CONTINUOUS
Status: DISCONTINUED | OUTPATIENT
Start: 2018-07-03 | End: 2018-07-06 | Stop reason: HOSPADM

## 2018-07-03 ENCOUNTER — HOSPITAL ENCOUNTER (OUTPATIENT)
Dept: INFUSION CENTER | Facility: CLINIC | Age: 68
Discharge: HOME | End: 2018-07-03
Payer: MEDICARE

## 2018-07-03 VITALS
DIASTOLIC BLOOD PRESSURE: 63 MMHG | HEART RATE: 63 BPM | TEMPERATURE: 98.2 F | BODY MASS INDEX: 37.26 KG/M2 | HEIGHT: 64 IN | WEIGHT: 218.26 LBS | SYSTOLIC BLOOD PRESSURE: 133 MMHG | RESPIRATION RATE: 18 BRPM

## 2018-07-03 PROCEDURE — 96413 CHEMO IV INFUSION 1 HR: CPT

## 2018-07-03 RX ADMIN — SODIUM CHLORIDE 20 ML/HR: 0.9 INJECTION, SOLUTION INTRAVENOUS at 12:04

## 2018-07-03 RX ADMIN — SODIUM CHLORIDE 240 MG: 9 INJECTION, SOLUTION INTRAVENOUS at 12:43

## 2018-07-06 ENCOUNTER — HOSPITAL ENCOUNTER (EMERGENCY)
Facility: HOSPITAL | Age: 68
Discharge: HOME | End: 2018-07-06
Attending: EMERGENCY MEDICINE
Payer: MEDICARE

## 2018-07-06 ENCOUNTER — APPOINTMENT (EMERGENCY)
Dept: RADIOLOGY | Facility: HOSPITAL | Age: 68
End: 2018-07-06
Payer: MEDICARE

## 2018-07-06 ENCOUNTER — OFFICE VISIT (OUTPATIENT)
Dept: HEMATOLOGY ONCOLOGY | Facility: CLINIC | Age: 68
End: 2018-07-06
Payer: MEDICARE

## 2018-07-06 VITALS
HEART RATE: 62 BPM | RESPIRATION RATE: 18 BRPM | OXYGEN SATURATION: 96 % | SYSTOLIC BLOOD PRESSURE: 164 MMHG | DIASTOLIC BLOOD PRESSURE: 72 MMHG

## 2018-07-06 DIAGNOSIS — S80.212A ABRASION OF LEFT KNEE: ICD-10-CM

## 2018-07-06 DIAGNOSIS — S01.511A LIP LACERATION: ICD-10-CM
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
## 2018-07-06 DIAGNOSIS — C34.92 ADENOCARCINOMA OF LEFT LUNG, STAGE 4 (HCC): Primary | ICD-10-CM

## 2018-07-06 PROCEDURE — 70450 CT HEAD/BRAIN W/O DYE: CPT

## 2018-07-06 PROCEDURE — 99214 OFFICE O/P EST MOD 30 MIN: CPT | Performed by: INTERNAL MEDICINE

## 2018-07-06 PROCEDURE — 99284 EMERGENCY DEPT VISIT MOD MDM: CPT

## 2018-07-06 PROCEDURE — 73564 X-RAY EXAM KNEE 4 OR MORE: CPT

## 2018-07-06 PROCEDURE — 73090 X-RAY EXAM OF FOREARM: CPT

## 2018-07-06 RX ORDER — BACITRACIN, NEOMYCIN, POLYMYXIN B 400; 3.5; 5 [USP'U]/G; MG/G; [USP'U]/G
1 OINTMENT TOPICAL ONCE
Status: COMPLETED | OUTPATIENT
Start: 2018-07-06 | End: 2018-07-06

## 2018-07-06 RX ORDER — LIDOCAINE HYDROCHLORIDE 10 MG/ML
5 INJECTION, SOLUTION EPIDURAL; INFILTRATION; INTRACAUDAL; PERINEURAL ONCE
Status: COMPLETED | OUTPATIENT
Start: 2018-07-06 | End: 2018-07-06

## 2018-07-06 RX ADMIN — BACITRACIN, NEOMYCIN, POLYMYXIN B 1 SMALL APPLICATION: 400; 3.5; 5 OINTMENT TOPICAL at 13:48

## 2018-07-06 RX ADMIN — LIDOCAINE HYDROCHLORIDE 5 ML: 10 INJECTION, SOLUTION EPIDURAL; INFILTRATION; INTRACAUDAL; PERINEURAL at 12:16

## 2018-07-06 NOTE — PROGRESS NOTES
Hematology Outpatient Follow - Up Note  Marylou Griffith 69 y o  female MRN: @ Encounter: [id number]        Date:  7/6/2018        Assessment/ Plan:    1  Fall in the parking lot with cut of the inner aspect of the upper lip, abrasion of the left knee, the patient is on a blood thinner, she will go to the ER for evaluation  2  Adenocarcinoma of the lung primary in the right upper lobe with left scapula involvement PET scan in October 2016 showed a right perihilar mass measuring 3 5 cm with SUV of 8 9, subcarinal lymphadenopathy, nodule in the left upper lobe of the lung measuring 2 x 1 cm, lytic lesion of right 10th costovertebral junction with SUV of 14, biopsy of the T10 showed non-small cell carcinoma with features suggestive of adenocarcinoma, PD L1 expression was 60%, she was treated with Pembrolizumab 200 mg flat dose every 3 weeks  initiated in December 2016 - May 2016 secondary to grade 3 pneumonitis, she had radiation therapy to the left scapula in October 2017   She progressed in April 2018, we decided to treat the patient with nivolumab 240 mg flat dose every 2 weeks with prednisone 10 mg p o   Daily to minimize any pneumonitis, she had been tolerating this very well, most recent CT scan of the chest in June 2018 showed stable partially solid ground-glass left upper lobe nodule measuring 2 7 x 1 3 cm, stable 1 cm left basilar nodule right middle lobe scarring, resolution of  rule out infiltrates, 1 4 x 1 6 cm cystic lesion in the distal pancreatic body stable, continue treatment with nivolumab 240 mg flat dose I do not like to escalate the dose to 480 mg every month because of previous history of a pneumonitis   Follow-up in 1 month with CBC, CMP, TSH          HPI:  77-year-old  female who was admitted to the hospital with arrhythmia, was found to have right lower lobe infiltrate in August 2016, treated with antibiotics however repeat chest x-ray showed persistent right lower lobe infiltrate, subsequently the patient had a CT scan of the chest showed a right perihilar mass, subcarinal lymphadenopathy, lytic lesion of the right costovertebral junction at T 10 level, /PET scan on October 2016 showed a right perihilar mass measuring 3 5 cm with SUV of 8 9, subcarinal lymph nodes measuring 3 4 x 2 2 cm was SUV of 9 2 nodule in the left upper lobe lung measuring 2 x 1 1 cm, lytic lesion involving the right 10th costovertebral junction with SUV of 14 4 of T10 showed non-small cell carcinoma with features suggesting of adenocarcinoma positive for CK 7, CK 19, CA-19-9, ANEUDY-3, partially positive for P40, p63, negative for TTF-1 had a history of uterine cancer in 2000 status post hysterectomy did not require radiation or chemotherapy status post bilateral oohrectomy, right knee replacement, but a cystectomy, tonsillectomy used to smoke for 35 years 1 pack per day however quit smoking 21 years ago used hormonal replacement therapy for 3 years history significant for skin cancer in her father and coronary artery disease in mother has 2 healthy children had any skin cancer before last mammogram was on June 2015, She is up-to-date with the colonoscopy an OB/GYN exam   Previous Therapy:  Pembrolizumab 200 mg IV flat dose every 3 weeks initiated in December 2016, Finished in May 2017 secondary to grade 3 pneumonitis radiation therapy to the left scapula in October 2017       Interval History:  She had a fall in the parking lot here with cut on her inner lips, bleeding, abrasion of the left knee        current therapy nivolumab 240 mg flat dose every 2 weeks initiated in April 2018 secondary to progression of disease in the lung and in the left scapula, prednisone 10 mg p o  Daily to prevent pneumonitis induced by previous immunotherapy with Pembrolizumab        Test Results:    Imaging: Ct Chest W Contrast    Result Date: 6/28/2018  Narrative: CT CHEST WITH IV CONTRAST INDICATION:   C34 92:  Malignant neoplasm of unspecified part of left bronchus or lung C41 9: 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, unspecified  COMPARISON: Chest CT 5/1/2017; PET CT 3/29/2018 TECHNIQUE: CT examination of the chest was performed  Axial, sagittal, and coronal 2D reformatted images were created from the source data and submitted for interpretation  Radiation dose length product (DLP) for this visit:  587 mGy-cm   This examination, like all CT scans performed in the [facility], was performed utilizing techniques to minimize radiation dose exposure, including the use of iterative reconstruction and automated exposure control  IV Contrast:  85 mL of iohexol (OMNIPAQUE) FINDINGS: LUNGS:  The partially solid, groundglass hypermetabolic nodule in the anterior segment of the left upper lobe is not significantly changed measuring 2 7 x 1 3 cm having measured 2 5 x 1 3 cm on the recent PET/CT consistent with active neoplasm  No new parenchymal or endobronchial lesions  Beth-fissural right middle lobe scarring is stable as is a well-circumscribed nodule in the anterior left lung base measuring 1 cm (3/43)  Again, this was not hypermetabolic  The lung infiltrates have otherwise resolved during the interim, likely infectious or inflammatory  PLEURA:  No pleural effusions or pneumothorax  HEART/GREAT VESSELS:  Unremarkable for patient's age  MEDIASTINUM AND ANABEL:  No pathologic lymphadenopathy  Small hiatal hernia stable  CHEST WALL AND LOWER NECK: Stable 7 mm left thyroid midpole cystic nodule  Incidental discovery of one or more thyroid nodule(s) measuring less than 1 5 cm and without suspicious features is noted in this patient who is above 28years old; according to guidelines published in the February 2015 white paper on incidental thyroid nodules in the Journal of the [employer] of Radiology [facility]), no further evaluation is recommended   VISUALIZED STRUCTURES IN THE UPPER ABDOMEN:  Stable tiny hepatic hypodensities likely benign  A 1 4 x 1 6 cm hypodense lesion in the distal pancreatic body (2/60) is retrospectively stable since the PET/CT and not metabolically active, consistent with cyst  For simple cyst(s) between 1 5 to 2 0 cm, recommend followup every 6 months for 4 times, then every 1 year for 2 times, then every 2 years for 3 times  If stable after 10 years, then no more followups  (If patient reaches age [de-identified], then can switch to  age [de-identified] algorithm ) Recommend next followup in 6 months  Preferred imaging modality: abdomen MRI and MRCP with and without IV contrast, or triple phase abdomen CT with IV contrast, or abdomen MRI and MRCP without IV contrast  The recommendations regarding pancreatic findings assumes that patient does not have family history of pancreatic cancer nor have any symptoms potentially attributable to pancreatic cystic lesions (hyperamylasemia, recent-onset diabetes, severe epigastric pain, weight loss, steatorrhea, or jaundice ) If these conditions are not true, then management should be deferred to judgement of specialists such as gastroenterologists or oncologic surgeons  Recommendations are based on recent consensus statements on management of pancreatic cystic lesions from [address] [place] [address] Gastroenterology Association, [address] [place] [address] of Radiology, the journal Pancreatology, and our own institutional consensus  REFERENCES: 1230 [employer] Guidelines on the Diagnosis and Management of Asymptomatic Neoplastic Pancreatic Cysts  Gastroenterology 2015; [phone] (AGA GUIDELINES) International Consensus Guidelines for Management of Intraductal Papillary Mucinous Neoplasm and Mucinous Cystic Neoplasms of the Pancreas  Pancreatology 12 (2012) [phone] Karen Pa paper) [facility] Radiology: White Paper: Managing Incidental Findings on Abdominal CT, JACR, October 2011   (ACR GUIDELINES ) OSSEOUS STRUCTURES:  A destructive, lytic lesion in the medial scapular again noted with associated soft tissue mass consistent with biopsy-proven metastasis  This has enlarged since the chest CT of May 2017  Impression: 1  Relatively stable size and morphology of partially solid, groundglass left upper lobe nodule measuring 2 7 x 1 3 cm, hypermetabolic on recent PET/CT consistent with viable neoplasm  2   Stable 1 cm left basilar nodule and right middle lobe scarring with interval resolution of scattered infiltrates  3   Destructive lytic lesion in the medial scapula, biopsy-proven metastasis  4   1 4 x 1 6 cm cystic lesion in the distal pancreatic body  Surveillance recommended as discussed above in detail the this is clinically appropriate for this patient  Workstation performed: [id number]       Labs:   Lab Results   Component Value Date    WBC 5 74 06/11/2018    HGB 12 2 06/11/2018    HCT 37 9 06/11/2018    MCV 84 06/11/2018     06/11/2018     Lab Results   Component Value Date     06/11/2018    K 4 2 06/11/2018     06/11/2018    CO2 28 06/11/2018    ANIONGAP 9 06/11/2018    BUN 19 06/11/2018    CREATININE 0 84 06/11/2018    GLUCOSE 119 03/28/2018    GLUF 153 (H) 06/11/2018    CALCIUM 9 2 06/11/2018    AST 25 06/11/2018    ALT 32 06/11/2018    ALKPHOS 80 06/11/2018    PROT 6 6 06/11/2018    BILITOT 0 40 06/11/2018    EGFR 71 06/11/2018       No results found for: IRON, TIBC, FERRITIN    No results found for: [id number]      ROS:   Review of Systems   Constitutional: Negative for activity change, appetite change, diaphoresis, fatigue, fever and unexpected weight change  HENT: Negative for facial swelling, hearing loss, rhinorrhea, sinus pain, sinus pressure, sneezing, sore throat and tinnitus  Eyes: Negative for photophobia, pain, discharge, redness, itching and visual disturbance  Respiratory: Negative for apnea, chest tightness and shortness of breath  Cardiovascular: Negative for chest pain, palpitations and leg swelling     Gastrointestinal: Negative for abdominal distention, abdominal pain, blood in stool, constipation, diarrhea, nausea, rectal pain and vomiting  Endocrine: Negative for cold intolerance, heat intolerance, polydipsia and polyphagia  Genitourinary: Negative for difficulty urinating, dyspareunia, frequency, hematuria, pelvic pain and urgency  Musculoskeletal: Negative for arthralgias, back pain, gait problem, joint swelling and myalgias  Skin: Negative for color change, pallor and rash  Allergic/Immunologic: Negative for environmental allergies and food allergies  Neurological: Negative for dizziness, tremors, seizures, syncope, speech difficulty, numbness and headaches  Hematological: Negative for adenopathy  Does not bruise/bleed easily  Psychiatric/Behavioral: Negative for agitation, confusion, dysphoric mood, hallucinations and suicidal ideas  Current Medications: Reviewed  Allergies: Reviewed  PMH/FH/SH:  Reviewed      Physical Exam:    There is no height or weight on file to calculate BSA  Wt Readings from Last 3 Encounters:   07/03/18 99 kg (218 lb 4 1 oz)   06/19/18 99 3 kg (219 lb)   06/13/18 98 9 kg (218 lb)        Temp Readings from Last 3 Encounters:   07/03/18 98 2 °F (36 8 °C) (Oral)   06/19/18 98 °F (36 7 °C) (Oral)   06/13/18 98 2 °F (36 8 °C) (Oral)        BP Readings from Last 3 Encounters:   07/03/18 133/63   06/19/18 120/70   06/13/18 117/58         Pulse Readings from Last 3 Encounters:   07/03/18 63   06/19/18 63   06/13/18 64        Physical Exam   Constitutional: She is oriented to person, place, and time  She appears well-developed and well-nourished  No distress  HENT:   Head: Normocephalic and atraumatic  Mouth/Throat: Oropharynx is clear and moist  No oropharyngeal exudate  Cut of the upper inner aspect of the lip   Eyes: Conjunctivae and EOM are normal  Pupils are equal, round, and reactive to light  Neck: Normal range of motion  Neck supple  No tracheal deviation present  No thyromegaly present  Cardiovascular: Normal rate and regular rhythm  Exam reveals no gallop and no friction rub  No murmur heard  Pulmonary/Chest: Effort normal and breath sounds normal  No respiratory distress  She has no wheezes  She has no rales  She exhibits no tenderness  Abdominal: Soft  Bowel sounds are normal  She exhibits no distension and no mass  There is no tenderness  There is no rebound and no guarding  Musculoskeletal: Normal range of motion  She exhibits no edema  Abrasion of the anterior aspect of the left knee, swelling of the left knee   Lymphadenopathy:     She has no cervical adenopathy  Neurological: She is alert and oriented to person, place, and time  Skin: Skin is warm and dry  No rash noted  She is not diaphoretic  No erythema  No pallor  Psychiatric: She has a normal mood and affect  Her behavior is normal  Judgment and thought content normal    Vitals reviewed  Goals and Barriers:  Current Goal: Minimize effects of disease  Barriers: None  Patient's Capacity to Self Care:  Patient is able to self care      Code Status: [unfilled]

## 2018-07-06 NOTE — ED PROVIDER NOTES
History  Chief Complaint   Patient presents with    Fall     Patient tripped and fell, hitting her upper lip and left knee  No LOC  67-year-old female presenting to the emergency department for evaluation of a fall  She had mechanical trip and fall and fell onto her face striking her upper lip as well as her left knee and sustaining an abrasion to the left knee  She is not lose consciousness is not complaining of neck pain and has no neurological complaints she denies any numbness weakness or tingling  She denies any chest pain shortness of breath or palpitations before during or after fall            Prior to Admission Medications   Prescriptions Last Dose Informant Patient Reported? Taking? Albuterol Sulfate (VENTOLIN HFA IN)  Self Yes Yes   Sig: Inhale   Calcium Carb-Cholecalciferol 600-800 MG-UNIT TABS  Self Yes Yes   Sig: Take 1 tablet by mouth 2 (two) times a day   Cholecalciferol (VITAMIN D) 2000 units CAPS  Self Yes Yes   Sig: Take by mouth   Linagliptin (TRADJENTA) 5 MG TABS  Self Yes Yes   Sig: Take 5 mg by mouth daily   acetaminophen (TYLENOL) 500 mg tablet  Self Yes Yes   Sig: Take 2 tablets by mouth 2 (two) times a day   apixaban (ELIQUIS) 5 mg  Self No Yes   Sig: Take one tablet twice daily   cyanocobalamin (VITAMIN B-12) 100 mcg tablet  Self Yes Yes   Sig: Take by mouth daily   metFORMIN (GLUCOPHAGE) 1000 MG tablet  Self Yes Yes   Sig: Take 1,000 mg by mouth 2 (two) times a day with meals     metoprolol tartrate (LOPRESSOR) 25 mg tablet  Self No Yes   Sig: Take 1 tablet (25 mg total) by mouth every 12 (twelve) hours   omeprazole (PriLOSEC) 20 mg delayed release capsule  Self Yes Yes   Sig: Take 1 capsule by mouth daily   ondansetron (ZOFRAN-ODT) 4 mg disintegrating tablet   No Yes   Sig: Take 1 tablet (4 mg total) by mouth every 6 (six) hours as needed for nausea or vomiting   predniSONE 10 mg tablet   No Yes   Sig: Take 1 tablet (10 mg total) by mouth daily   sotalol (BETAPACE) 80 mg tablet  Self No Yes   Sig: Take 1 tablet (80 mg total) by mouth every 12 (twelve) hours   venlafaxine (EFFEXOR) 75 mg tablet   No Yes   Sig: Take 0 5 tablets (37 5 mg total) by mouth daily      Facility-Administered Medications: None       Past Medical History:   Diagnosis Date    [facility]rial fibrillation ([facility] )     Diabetes mellitus ([place] [address] )     Frozen shoulder     L shoulder    GERD (gastroesophageal reflux disease)     HTN (hypertension)     Hx of cancer of uterus     Hyperlipidemia     Lung mass     diagnosed 9/2016    Stage 4 lung cancer ([place] [address] )        Past Surgical History:   Procedure Laterality Date    BRONCHOSCOPY N/A 11/17/2016    Procedure: BRONCHOSCOPY FLEXIBLE;  Surgeon: Ozzie Leon MD;  [facility];  Service:    Deshaun Umanzor CHOLECYSTECTOMY      HYSTERECTOMY      SD BRONCHOSCOPY NEEDLE BX TRACHEA MAIN STEM&/BRON N/A 11/17/2016    Procedure: EBUS; FROZEN SECTION ;  Surgeon: Ozzie Leon MD;  Location: [facility];  Service: Thoracic    SD [address] N/A 5/24/2017    Procedure: Phyllistine Amis;  Surgeon: Asad Manzo MD;  Location: [facility]; Service: Pulmonary    TONSILECTOMY AND ADNOIDECTOMY      TOTAL KNEE ARTHROPLASTY Right        Family History   Problem Relation Age of Onset    Heart disease Father     Hypertension Father     Diabetes Mother     Heart disease Mother      I have reviewed and agree with the history as documented  Social History   Substance Use Topics    Smoking status: Former Smoker     Packs/day: 1 00     Years: 50 00     Types: Cigarettes     Start date: 1962     Quit date: 2012    Smokeless tobacco: Never Used      Comment: Quit in 2000    Alcohol use No        Review of Systems   Constitutional: Negative for appetite change, chills, fatigue and fever  HENT: Negative for sneezing and sore throat  Eyes: Negative for visual disturbance  Respiratory: Negative for cough, choking, chest tightness, shortness of breath and wheezing  Cardiovascular: Negative for chest pain and palpitations  Gastrointestinal: Negative for abdominal pain, constipation, diarrhea, nausea and vomiting  Genitourinary: Negative for difficulty urinating and dysuria  Musculoskeletal: Positive for arthralgias  Skin: Positive for wound  Neurological: Negative for dizziness, weakness, light-headedness, numbness and headaches  All other systems reviewed and are negative  Physical Exam  ED Triage Vitals   Temp Pulse Respirations Blood Pressure SpO2   -- 07/06/18 1156 07/06/18 1156 07/06/18 1156 07/06/18 1156    65 16 (!) 174/81 96 %      Temp src Heart Rate Source Patient Position - Orthostatic VS BP Location FiO2 (%)   -- 07/06/18 1230 07/06/18 1156 07/06/18 1156 --    Monitor Sitting Right arm       Pain Score       07/06/18 1156       7           Orthostatic Vital Signs  Vitals:    07/06/18 1156 07/06/18 1230   BP: (!) 174/81 164/72   Pulse: 65 62   Patient Position - Orthostatic VS: Sitting Sitting       Physical Exam   Constitutional: She is oriented to person, place, and time  She appears well-developed and well-nourished  No distress  HENT:   Head: Normocephalic  Mouth/Throat: Oropharynx is clear and moist     Abrasion to the philtrum of the upper lip without laceration  There are   Eyes: EOM are normal  Pupils are equal, round, and reactive to light  Neck: No JVD present  No tracheal deviation present  Cardiovascular: Normal rate, regular rhythm, normal heart sounds and intact distal pulses  Exam reveals no gallop and no friction rub  No murmur heard  Pulmonary/Chest: Effort normal and breath sounds normal  No respiratory distress  She has no wheezes  She has no rales  Abdominal: Soft  Bowel sounds are normal  She exhibits no distension  There is no tenderness  There is no rebound and no guarding  Musculoskeletal:    Abrasion to the left knee  Full range of motion without tenderness  No neurovascular compromise noted  Neurological: She is alert and oriented to person, place, and time  No cranial nerve deficit  She exhibits normal muscle tone  Skin: Skin is warm and dry  She is not diaphoretic  No pallor  Psychiatric: She has a normal mood and affect  Her behavior is normal    Nursing note and vitals reviewed  ED Medications  Medications   lidocaine (PF) (XYLOCAINE-MPF) 1 % injection 5 mL (5 mL Infiltration Given by Other 7/6/18 1216)   neomycin-bacitracin-polymyxin b (NEOSPORIN) ointment 1 small application (1 small application Topical Given 7/6/18 1348)       Diagnostic Studies  Results Reviewed     None                 XR knee 4+ vw left injury   ED Interpretation by Brian Ernst MD (07/06 1247)   Significant degenerative changes without acute osseous injury       Final Result by Jean Freeman DO (07/06 1324)      No acute osseous abnormality  Suprapatellar fullness suggesting moderate joint effusion  Degenerative changes as described  Workstation performed: [id number]         XR forearm 2 views LEFT   ED Interpretation by Brian Ernst MD (07/06 1246)   No acute osseous abnormality      Final Result by Jean Freeman DO (07/06 1323)      No acute osseous abnormality  Workstation performed: [id number]         CT head without contrast   Final Result by Jose A Sher MD (07/06 1311)      No acute intracranial abnormality  Workstation performed: [id number]               Procedures  Lac Repair  Date/Time: 7/6/2018 12:20 PM  Performed by: Jens Sosa  Authorized by: Jens Sosa   Consent: Verbal consent obtained    Consent given by: patient  Patient identity confirmed: verbally with patient  Body area: mouth  Location details: upper lip, interior  Foreign bodies: no foreign bodies  Anesthesia: local infiltration    Anesthesia:  Local Anesthetic: lidocaine 1% without epinephrine    Wound Dehiscence:  Superficial Wound Dehiscence: simple closure      Procedure Details:  Preparation: Patient was prepped and draped in the usual sterile fashion  Irrigation solution: saline  Mucous membrane closure: 4-0 Chromic gut  Number of sutures: 2  Technique: simple  Approximation: close  Dressing: 4x4 sterile gauze  Patient tolerance: Patient tolerated the procedure well with no immediate complications            Phone Consults  ED Phone Contact    ED Course                               MDM  Number of Diagnoses or Management Options  Abrasion of left knee:   Fall:   Lip laceration:   Diagnosis management comments:   68-year-old female with fall with laceration  Will obtain CT head and x-rays of the injured extremities  Give pain medicines repair laceration as described above discharge if imaging unremarkable  Amount and/or Complexity of Data Reviewed  Tests in the radiology section of CPT®: reviewed      CritCare Time    Disposition  Final diagnoses:   Fall   Lip laceration   Abrasion of left knee     Time reflects when diagnosis was documented in both MDM as applicable and the Disposition within this note     Time User Action Codes Description Comment    7/6/2018  1:41 PM Roger [address] [place] [address] [M75  XXXA] Fall     7/6/2018  1:43 PM Robbie Rocha Add [I35 [id number]] Lip laceration     7/6/2018  1:43 PM Isac Duran Add [S80 [id number]] Abrasion of left knee       ED Disposition     ED Disposition Condition Comment    Discharge  [address] discharge to home/self care      Condition at discharge: Stable        Follow-up Information     Follow up With Specialties Details Why [address] [facility], [address] [place] [address]  [facility]  [address]  [phone]            Discharge Medication List as of 7/6/2018  1:43 PM      CONTINUE these medications which have NOT CHANGED    Details   acetaminophen (TYLENOL) 500 mg tablet Take 2 tablets by mouth 2 (two) times a day, Historical Med      Albuterol Sulfate (VENTOLIN HFA IN) Inhale, Historical Med      apixaban (ELIQUIS) 5 mg Take one tablet twice daily, Normal      Calcium Carb-Cholecalciferol 600-800 MG-UNIT TABS Take 1 tablet by mouth 2 (two) times a day, Historical Med      Cholecalciferol (VITAMIN D) 2000 units CAPS Take by mouth, Starting Tue 6/20/2017, Historical Med      cyanocobalamin (VITAMIN B-12) 100 mcg tablet Take by mouth daily, Historical Med      Linagliptin (TRADJENTA) 5 MG TABS Take 5 mg by mouth daily, Historical Med      metFORMIN (GLUCOPHAGE) 1000 MG tablet Take 1,000 mg by mouth 2 (two) times a day with meals  , Historical Med      metoprolol tartrate (LOPRESSOR) 25 mg tablet Take 1 tablet (25 mg total) by mouth every 12 (twelve) hours, Starting Mon 3/12/2018, Until Tue 3/12/2019, Normal      omeprazole (PriLOSEC) 20 mg delayed release capsule Take 1 capsule by mouth daily, Starting Mon 11/13/2017, Historical Med      ondansetron (ZOFRAN-ODT) 4 mg disintegrating tablet Take 1 tablet (4 mg total) by mouth every 6 (six) hours as needed for nausea or vomiting, Starting Wed 6/13/2018, No Print      predniSONE 10 mg tablet Take 1 tablet (10 mg total) by mouth daily, Starting Tue 4/3/2018, Normal      sotalol (BETAPACE) 80 mg tablet Take 1 tablet (80 mg total) by mouth every 12 (twelve) hours, Starting Mon 3/12/2018, Until Tue 3/12/2019, Normal      venlafaxine (EFFEXOR) 75 mg tablet Take 0 5 tablets (37 5 mg total) by mouth daily, Starting Mon 5/7/2018, Normal           No discharge procedures on file  ED Provider  Attending physically available and evaluated Caroline Sameer CHU managed the patient along with the ED Attending      Electronically Signed by         Tez Mederos MD  07/09/18 0252

## 2018-07-06 NOTE — ED ATTENDING ATTESTATION
Salvador Cope DO, saw and evaluated the patient  I have discussed the patient with the resident/non-physician practitioner and agree with the resident's/non-physician practitioner's findings, Plan of Care, and MDM as documented in the resident's/non-physician practitioner's note, except where noted  All available labs and Radiology studies were reviewed  At this point I agree with the current assessment done in the Emergency Department  I have conducted an independent evaluation of this patient a history and physical is as follows:      Patient presents for evaluation of injuries after mechanical trip and fall while ambulating in the parking lot of her doctor's office  Patient has a h/o cancer room was going to her oncologist's office when she tripped and fell forward, striking her face, chest, hands and left knee  She denies LOC  She does take Eliquis  She was able to get herself up and continue ambulating  She has no external injuries on her upper extremities but complains of muscular pain in both of her wrists/forearms when she makes a fist only  She has a large abrasion over her left knee that has stopped bleeding  She has a small abrasion over the exterior of her upper lip with a less than 1 cm shallow laceration on the inside of her upper lip, both of which have stopped bleeding  She also feels like her teeth are not lined up right, though nothing is visibly changed  ROS: Denies visual change, LH/dizziness, HA, neck or back pain, CP, SOB, abdominal pain, n/v  12 system ROS o/w negative       PE: NAD, appears comfortable, alert, GCS 15; PERRL, EOMI; no hemotympanum; no septal hematoma or epistaxis; MMM, no post OP exudate, edema or erythema, no dental trauma; no midline vert TTP, no crepitus or step-offs; HRR, no murmur; lungs CTA w/o w/r/r, POx 96% on RA (nl); abd s/nt/nd, nl BS in all four quadrants; (-) LE edema, no calf TTP, stable pelvis, FROM extremities x4, strength and sensation appear intact; skin on upper lip has a 1 cm linear abrasion without active bleeding or gaping, on left knee has a greater than 10 cm area of shallow abrasion without gaping or bleeding, otherwise p/w/d; CN II-XII GI/NF, oriented  DDx: Fall with facial and extremity injuries - Multiple abrasions, wrist/forearm sprains, doubt internal bleeding or fractures  A/P: Will check CT head and C-spine, x-rays,  treat symptoms, reevaluate for further work up and disposition      Critical Care Time  CritCare Time    Procedures

## 2018-07-06 NOTE — LETTER
July 6, 2018     [facility], [address] [facility]  [address] [address],[address]  [address] [id number]    Patient: Caroline Estrella   YOB: 1950   Date of Visit: 7/6/2018       Dear Dr Guanako De La Rosa:    Thank you for referring Florentino Hillman to me for evaluation  Below are my notes for this consultation  If you have questions, please do not hesitate to call me  I look forward to following your patient along with you  Sincerely,        Ismael Cummins MD        CC: No Recipients  Ismael Cummins MD  7/6/2018 11:54 AM  Sign at close encounter  Hematology Outpatient Follow - Up Note  Caroline Estrella 69 y o  female MRN: @ Encounter: [id number]        Date:  7/6/2018        Assessment/ Plan:    1  Fall in the parking lot with cut of the inner aspect of the upper lip, abrasion of the left knee, the patient is on a blood thinner, she will go to the ER for evaluation  2  Adenocarcinoma of the lung primary in the right upper lobe with left scapula involvement PET scan in October 2016 showed a right perihilar mass measuring 3 5 cm with SUV of 8 9, subcarinal lymphadenopathy, nodule in the left upper lobe of the lung measuring 2 x 1 cm, lytic lesion of right 10th costovertebral junction with SUV of 14, biopsy of the T10 showed non-small cell carcinoma with features suggestive of adenocarcinoma, PD L1 expression was 60%, she was treated with Pembrolizumab 200 mg flat dose every 3 weeks  initiated in December 2016 - May 2016 secondary to grade 3 pneumonitis, she had radiation therapy to the left scapula in October 2017   She progressed in April 2018, we decided to treat the patient with nivolumab 240 mg flat dose every 2 weeks with prednisone 10 mg p o   Daily to minimize any pneumonitis, she had been tolerating this very well, most recent CT scan of the chest in June 2018 showed stable partially solid ground-glass left upper lobe nodule measuring 2 7 x 1 3 cm, stable 1 cm left basilar nodule right middle lobe scarring, resolution of  rule out infiltrates, 1 4 x 1 6 cm cystic lesion in the distal pancreatic body stable, continue treatment with nivolumab 240 mg flat dose I do not like to escalate the dose to 480 mg every month because of previous history of a pneumonitis   Follow-up in 1 month with CBC, CMP, TSH          HPI:  68-year-old  female who was admitted to the hospital with arrhythmia, was found to have right lower lobe infiltrate in August 2016, treated with antibiotics however repeat chest x-ray showed persistent right lower lobe infiltrate, subsequently the patient had a CT scan of the chest showed a right perihilar mass, subcarinal lymphadenopathy, lytic lesion of the right costovertebral junction at T 10 level, /PET scan on October 2016 showed a right perihilar mass measuring 3 5 cm with SUV of 8 9, subcarinal lymph nodes measuring 3 4 x 2 2 cm was SUV of 9 2 nodule in the left upper lobe lung measuring 2 x 1 1 cm, lytic lesion involving the right 10th costovertebral junction with SUV of 14 4 of T10 showed non-small cell carcinoma with features suggesting of adenocarcinoma positive for CK 7, CK 19, CA-19-9, ANEUDY-3, partially positive for P40, p63, negative for TTF-1 had a history of uterine cancer in 2000 status post hysterectomy did not require radiation or chemotherapy status post bilateral oohrectomy, right knee replacement, but a cystectomy, tonsillectomy used to smoke for 35 years 1 pack per day however quit smoking 21 years ago used hormonal replacement therapy for 3 years history significant for skin cancer in her father and coronary artery disease in mother has 2 healthy children had any skin cancer before last mammogram was on June 2015, She is up-to-date with the colonoscopy an OB/GYN exam   Previous Therapy:  Pembrolizumab 200 mg IV flat dose every 3 weeks initiated in December 2016, Finished in May 2017 secondary to grade 3 pneumonitis radiation therapy to the left scapula in October 2017       Interval History:  She had a fall in the parking lot here with cut on her inner lips, bleeding, abrasion of the left knee        current therapy nivolumab 240 mg flat dose every 2 weeks initiated in April 2018 secondary to progression of disease in the lung and in the left scapula, prednisone 10 mg p o  Daily to prevent pneumonitis induced by previous immunotherapy with Pembrolizumab        Test Results:    Imaging: Ct Chest W Contrast    Result Date: 6/28/2018  Narrative: CT CHEST WITH IV CONTRAST INDICATION:   C34 92: Malignant neoplasm of unspecified part of left bronchus or lung C41 9: 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, unspecified  COMPARISON: Chest CT 5/1/2017; PET CT 3/29/2018 TECHNIQUE: CT examination of the chest was performed  Axial, sagittal, and coronal 2D reformatted images were created from the source data and submitted for interpretation  Radiation dose length product (DLP) for this visit:  062 mGy-cm   This examination, like all CT scans performed in the [facility], was performed utilizing techniques to minimize radiation dose exposure, including the use of iterative reconstruction and automated exposure control  IV Contrast:  85 mL of iohexol (OMNIPAQUE) FINDINGS: LUNGS:  The partially solid, groundglass hypermetabolic nodule in the anterior segment of the left upper lobe is not significantly changed measuring 2 7 x 1 3 cm having measured 2 5 x 1 3 cm on the recent PET/CT consistent with active neoplasm  No new parenchymal or endobronchial lesions  Beth-fissural right middle lobe scarring is stable as is a well-circumscribed nodule in the anterior left lung base measuring 1 cm (3/43)  Again, this was not hypermetabolic  The lung infiltrates have otherwise resolved during the interim, likely infectious or inflammatory  PLEURA:  No pleural effusions or pneumothorax  HEART/GREAT VESSELS:  Unremarkable for patient's age  MEDIASTINUM AND ANABEL:  No pathologic lymphadenopathy  Small hiatal hernia stable  CHEST WALL AND LOWER NECK: Stable 7 mm left thyroid midpole cystic nodule  Incidental discovery of one or more thyroid nodule(s) measuring less than 1 5 cm and without suspicious features is noted in this patient who is above 28years old; according to guidelines published in the February 2015 white paper on incidental thyroid nodules in the Journal of the [employer] [facility]), no further evaluation is recommended  VISUALIZED STRUCTURES IN THE UPPER ABDOMEN:  Stable tiny hepatic hypodensities likely benign  A 1 4 x 1 6 cm hypodense lesion in the distal pancreatic body (2/60) is retrospectively stable since the PET/CT and not metabolically active, consistent with cyst  For simple cyst(s) between 1 5 to 2 0 cm, recommend followup every 6 months for 4 times, then every 1 year for 2 times, then every 2 years for 3 times  If stable after 10 years, then no more followups  (If patient reaches age [de-identified], then can switch to  age [de-identified] algorithm ) Recommend next followup in 6 months  Preferred imaging modality: abdomen MRI and MRCP with and without IV contrast, or triple phase abdomen CT with IV contrast, or abdomen MRI and MRCP without IV contrast  The recommendations regarding pancreatic findings assumes that patient does not have family history of pancreatic cancer nor have any symptoms potentially attributable to pancreatic cystic lesions (hyperamylasemia, recent-onset diabetes, severe epigastric pain, weight loss, steatorrhea, or jaundice ) If these conditions are not true, then management should be deferred to judgement of specialists such as gastroenterologists or oncologic surgeons  Recommendations are based on recent consensus statements on management of pancreatic cystic lesions from [address] [place] [address] Gastroenterology Association, [employer], the journal Pancreatology, and our own institutional consensus   REFERENCES: American Gastroenterological Association [place] Guidelines on the Diagnosis and Management of Asymptomatic Neoplastic Pancreatic Cysts  Gastroenterology 2015; [phone] (AGA GUIDELINES) International Consensus Guidelines for Management of Intraductal Papillary Mucinous Neoplasm and Mucinous Cystic Neoplasms of the Pancreas  Pancreatology 12 (2012) [phone] Petra Bean paper) [employer]: White Paper: Managing Incidental Findings on Abdominal CT, JACR, October 2011  (ACR GUIDELINES ) OSSEOUS STRUCTURES:  A destructive, lytic lesion in the medial scapular again noted with associated soft tissue mass consistent with biopsy-proven metastasis  This has enlarged since the chest CT of May 2017  Impression: 1  Relatively stable size and morphology of partially solid, groundglass left upper lobe nodule measuring 2 7 x 1 3 cm, hypermetabolic on recent PET/CT consistent with viable neoplasm  2   Stable 1 cm left basilar nodule and right middle lobe scarring with interval resolution of scattered infiltrates  3   Destructive lytic lesion in the medial scapula, biopsy-proven metastasis  4   1 4 x 1 6 cm cystic lesion in the distal pancreatic body  Surveillance recommended as discussed above in detail the this is clinically appropriate for this patient   Workstation performed: [id number]       Labs:   Lab Results   Component Value Date    WBC 5 74 06/11/2018    HGB 12 2 06/11/2018    HCT 37 9 06/11/2018    MCV 84 06/11/2018     06/11/2018     Lab Results   Component Value Date     06/11/2018    K 4 2 06/11/2018     06/11/2018    CO2 28 06/11/2018    ANIONGAP 9 06/11/2018    BUN 19 06/11/2018    CREATININE 0 84 06/11/2018    GLUCOSE 119 03/28/2018    GLUF 153 (H) 06/11/2018    CALCIUM 9 2 06/11/2018    AST 25 06/11/2018    ALT 32 06/11/2018    ALKPHOS 80 06/11/2018    PROT 6 6 06/11/2018    BILITOT 0 40 06/11/2018    EGFR 71 06/11/2018       No results found for: IRON, TIBC, FERRITIN    No results found for: [id number]      ROS:   Review of Systems   Constitutional: Negative for activity change, appetite change, diaphoresis, fatigue, fever and unexpected weight change  HENT: Negative for facial swelling, hearing loss, rhinorrhea, sinus pain, sinus pressure, sneezing, sore throat and tinnitus  Eyes: Negative for photophobia, pain, discharge, redness, itching and visual disturbance  Respiratory: Negative for apnea, chest tightness and shortness of breath  Cardiovascular: Negative for chest pain, palpitations and leg swelling  Gastrointestinal: Negative for abdominal distention, abdominal pain, blood in stool, constipation, diarrhea, nausea, rectal pain and vomiting  Endocrine: Negative for cold intolerance, heat intolerance, polydipsia and polyphagia  Genitourinary: Negative for difficulty urinating, dyspareunia, frequency, hematuria, pelvic pain and urgency  Musculoskeletal: Negative for arthralgias, back pain, gait problem, joint swelling and myalgias  Skin: Negative for color change, pallor and rash  Allergic/Immunologic: Negative for environmental allergies and food allergies  Neurological: Negative for dizziness, tremors, seizures, syncope, speech difficulty, numbness and headaches  Hematological: Negative for adenopathy  Does not bruise/bleed easily  Psychiatric/Behavioral: Negative for agitation, confusion, dysphoric mood, hallucinations and suicidal ideas  Current Medications: Reviewed  Allergies: Reviewed  PMH/FH/SH:  Reviewed      Physical Exam:    There is no height or weight on file to calculate BSA      Wt Readings from Last 3 Encounters:   07/03/18 99 kg (218 lb 4 1 oz)   06/19/18 99 3 kg (219 lb)   06/13/18 98 9 kg (218 lb)        Temp Readings from Last 3 Encounters:   07/03/18 98 2 °F (36 8 °C) (Oral)   06/19/18 98 °F (36 7 °C) (Oral)   06/13/18 98 2 °F (36 8 °C) (Oral)        BP Readings from Last 3 Encounters:   07/03/18 133/63   06/19/18 120/70   06/13/18 117/58 Pulse Readings from Last 3 Encounters:   07/03/18 63   06/19/18 63   06/13/18 64        Physical Exam   Constitutional: She is oriented to person, place, and time  She appears well-developed and well-nourished  No distress  HENT:   Head: Normocephalic and atraumatic  Mouth/Throat: Oropharynx is clear and moist  No oropharyngeal exudate  Cut of the upper inner aspect of the lip   Eyes: Conjunctivae and EOM are normal  Pupils are equal, round, and reactive to light  Neck: Normal range of motion  Neck supple  No tracheal deviation present  No thyromegaly present  Cardiovascular: Normal rate and regular rhythm  Exam reveals no gallop and no friction rub  No murmur heard  Pulmonary/Chest: Effort normal and breath sounds normal  No respiratory distress  She has no wheezes  She has no rales  She exhibits no tenderness  Abdominal: Soft  Bowel sounds are normal  She exhibits no distension and no mass  There is no tenderness  There is no rebound and no guarding  Musculoskeletal: Normal range of motion  She exhibits no edema  Abrasion of the anterior aspect of the left knee, swelling of the left knee   Lymphadenopathy:     She has no cervical adenopathy  Neurological: She is alert and oriented to person, place, and time  Skin: Skin is warm and dry  No rash noted  She is not diaphoretic  No erythema  No pallor  Psychiatric: She has a normal mood and affect  Her behavior is normal  Judgment and thought content normal    Vitals reviewed  Goals and Barriers:  Current Goal: Minimize effects of disease  Barriers: None  Patient's Capacity to Self Care:  Patient is able to self care      Code Status: [unfilled]

## 2018-07-06 NOTE — DISCHARGE INSTRUCTIONS
Facial Laceration   WHAT YOU NEED TO KNOW:   A facial laceration is a tear or cut in the skin caused by blunt or shearing forces, or sharp objects  Facial lacerations may be closed within 24 hours of injury  DISCHARGE INSTRUCTIONS:   Return to the emergency department if:   · You have a fever and the wound is painful, warm, or swollen  The wound area may be red, or fluid may come out of it  · You have heavy bleeding or bleeding that does not stop after 10 minutes of holding firm, direct pressure over the wound  Contact your healthcare provider if:   · Your wound reopens or your tape comes off  · Your wound is very painful  · Your wound is not healing, or you think there is an object in the wound  · The skin around your wound stays numb  · You have questions or concerns about your condition or care  Medicines:   · Antibiotics  may be given to prevent an infection if your wound was deep and had to be cleaned out  · Take your medicine as directed  Contact your healthcare provider if you think your medicine is not helping or if you have side effects  Tell him of her if you are allergic to any medicine  Keep a list of the medicines, vitamins, and herbs you take  Include the amounts, and when and why you take them  Bring the list or the pill bottles to follow-up visits  Carry your medicine list with you in case of an emergency  Care for your wound:  Care for your wound as directed to prevent infection and help it heal  Wash your hands with soap and warm water before and after you care for your wound  You may need to keep the wound dry for the first 24 to 48 hours  When your healthcare provider says it is okay, wash around your wound with soap and water, or as directed  Gently pat the area dry  Do not use alcohol or hydrogen peroxide to clean your wound unless you are directed to  · Do not take aspirin or NSAIDs for 24 hours after being injured  Aspirin and NSAIDs can increase blood flow   Your laceration may continue to bleed  · Do not take hot showers, eat or drink hot foods and liquids for 48 hours after being injured  Also, do not use a heating pad near your laceration  The heat can cause swelling in and around your laceration  · If your wound was covered with a bandage,  leave your bandage on as long as directed  Bandages keep your wound clean and protected  They can also prevent swelling  Ask when and how to change your bandage  Be careful not to apply the bandage or tape too tightly  This could cut off blood flow and cause more injury  · If your wound was closed with stitches,  keep your wound clean  Your healthcare provider may recommend that you apply antibiotic ointment after you clean your wound  · If your wound was closed with wound tape or medical strips,  keep the area clean and dry  The strips will usually fall off on their own after several days  · If your wound was closed with tissue glue,  do not use any ointments or lotions on the area  You may shower, but do not swim or soak in a bathtub  Gently pat the area dry after you take a shower  Do not pick at or scrub the glue area  Decrease scarring: The skin in the area of your wound may turn a different color if it is exposed to direct sunlight  After your wound is healed, use sunscreen over the area when you are out in the sun  You should do this for at least 6 months to 1 year after your injury  Some wounds scar less if they are covered while they heal   Follow up with your healthcare provider as directed: You may need to follow up with your healthcare provider in 24 to 48 hours to have your wound checked for infection  You may need to return in 3 to 5 days if you have stitches that need to be removed  Write down your questions so you remember to ask them during your visits    © 2017 [employer] Information is for End User's use only and may not be sold, redistributed or otherwise used for commercial purposes  All illustrations and images included in CareNotes® are the copyrighted property of A D A M , Inc  or [employer]  The above information is an  only  It is not intended as medical advice for individual conditions or treatments  Talk to your doctor, nurse or pharmacist before following any medical regimen to see if it is safe and effective for you

## 2018-07-12 ENCOUNTER — OFFICE VISIT (OUTPATIENT)
Dept: FAMILY MEDICINE CLINIC | Facility: CLINIC | Age: 68
End: 2018-07-12
Payer: MEDICARE

## 2018-07-12 ENCOUNTER — APPOINTMENT (OUTPATIENT)
Dept: LAB | Facility: CLINIC | Age: 68
End: 2018-07-12
Payer: MEDICARE

## 2018-07-12 VITALS — DIASTOLIC BLOOD PRESSURE: 80 MMHG | RESPIRATION RATE: 16 BRPM | SYSTOLIC BLOOD PRESSURE: 130 MMHG | HEART RATE: 80 BPM

## 2018-07-12 DIAGNOSIS — T07.XXXA MULTIPLE CONTUSIONS: ICD-10-CM

## 2018-07-12 DIAGNOSIS — S80.212D ABRASION OF LEFT KNEE, SUBSEQUENT ENCOUNTER: ICD-10-CM

## 2018-07-12 DIAGNOSIS — C34.92 ADENOCARCINOMA OF LEFT LUNG, STAGE 4 (HCC): ICD-10-CM

## 2018-07-12 DIAGNOSIS — S01.511D LIP LACERATION, SUBSEQUENT ENCOUNTER: Primary | ICD-10-CM

## 2018-07-12 LAB
ALBUMIN SERPL BCP-MCNC: 3 G/DL (ref 3.5–5)
ALP SERPL-CCNC: 80 U/L (ref 46–116)
ALT SERPL W P-5'-P-CCNC: 34 U/L (ref 12–78)
ANION GAP SERPL CALCULATED.3IONS-SCNC: 7 MMOL/L (ref 4–13)
AST SERPL W P-5'-P-CCNC: 20 U/L (ref 5–45)
BASOPHILS # BLD AUTO: 0.04 THOUSANDS/ΜL (ref 0–0.1)
BASOPHILS NFR BLD AUTO: 1 % (ref 0–1)
BILIRUB SERPL-MCNC: 0.5 MG/DL (ref 0.2–1)
BUN SERPL-MCNC: 15 MG/DL (ref 5–25)
CALCIUM SERPL-MCNC: 8.9 MG/DL (ref 8.3–10.1)
CHLORIDE SERPL-SCNC: 104 MMOL/L (ref 100–108)
CO2 SERPL-SCNC: 30 MMOL/L (ref 21–32)
CREAT SERPL-MCNC: 0.95 MG/DL (ref 0.6–1.3)
EOSINOPHIL # BLD AUTO: 0.2 THOUSAND/ΜL (ref 0–0.61)
EOSINOPHIL NFR BLD AUTO: 2 % (ref 0–6)
ERYTHROCYTE [DISTWIDTH] IN BLOOD BY AUTOMATED COUNT: 13.6 % (ref 11.6–15.1)
GFR SERPL CREATININE-BSD FRML MDRD: 61 ML/MIN/1.73SQ M
GLUCOSE SERPL-MCNC: 152 MG/DL (ref 65–140)
HCT VFR BLD AUTO: 37 % (ref 34.8–46.1)
HGB BLD-MCNC: 11.8 G/DL (ref 11.5–15.4)
LYMPHOCYTES # BLD AUTO: 1.05 THOUSANDS/ΜL (ref 0.6–4.47)
LYMPHOCYTES NFR BLD AUTO: 12 % (ref 14–44)
MCH RBC QN AUTO: 27.1 PG (ref 26.8–34.3)
MCHC RBC AUTO-ENTMCNC: 31.9 G/DL (ref 31.4–37.4)
MCV RBC AUTO: 85 FL (ref 82–98)
MONOCYTES # BLD AUTO: 0.58 THOUSAND/ΜL (ref 0.17–1.22)
MONOCYTES NFR BLD AUTO: 7 % (ref 4–12)
NEUTROPHILS # BLD AUTO: 6.95 THOUSANDS/ΜL (ref 1.85–7.62)
NEUTS SEG NFR BLD AUTO: 79 % (ref 43–75)
PLATELET # BLD AUTO: 272 THOUSANDS/UL (ref 149–390)
PMV BLD AUTO: 10.2 FL (ref 8.9–12.7)
POTASSIUM SERPL-SCNC: 4.1 MMOL/L (ref 3.5–5.3)
PROT SERPL-MCNC: 6.7 G/DL (ref 6.4–8.2)
RBC # BLD AUTO: 4.36 MILLION/UL (ref 3.81–5.12)
SODIUM SERPL-SCNC: 141 MMOL/L (ref 136–145)
TSH SERPL DL<=0.05 MIU/L-ACNC: 1.54 UIU/ML (ref 0.36–3.74)
WBC # BLD AUTO: 8.82 THOUSAND/UL (ref 4.31–10.16)

## 2018-07-12 PROCEDURE — 99214 OFFICE O/P EST MOD 30 MIN: CPT | Performed by: FAMILY MEDICINE

## 2018-07-12 PROCEDURE — 84443 ASSAY THYROID STIM HORMONE: CPT

## 2018-07-12 PROCEDURE — 85025 COMPLETE CBC W/AUTO DIFF WBC: CPT

## 2018-07-12 PROCEDURE — 80053 COMPREHEN METABOLIC PANEL: CPT

## 2018-07-12 PROCEDURE — 36415 COLL VENOUS BLD VENIPUNCTURE: CPT

## 2018-07-12 NOTE — PROGRESS NOTES
Assessment/Plan:    No problem-specific Assessment & Plan notes found for this encounter  Diagnoses and all orders for this visit:    Lip laceration, subsequent encounter  Comments:  Healing well at this time  Avoid more acidy type foods short term  Abrasion of left knee, subsequent encounter  Comments:  Healing as well  Multiple contusions  Comments:  Healing; pt can take OTC Tylenol PRN, use heat to the regions, etc           Subjective:      Patient ID: Yajaira Hernandez is a 77 y o  female  Cassidy Waite on 7/11/18 was going to an OV with Dr Boone Connell - tripped and struck face and knee  Had 3 sutures in her lip to close in the ER - records reviewed    Head CT, Left Knee xrays, left forearm records normal           The following portions of the patient's history were reviewed and updated as appropriate: allergies, current medications, past family history, past social history, past surgical history and problem list     Past Medical History:   Diagnosis Date    Arthritis     [facility]rial fibrillation [facility]     Diabetes mellitus ([facility] )     Diabetes mellitus type 2, uncomplicated ([facility] )     Last assessed: 8/17/17    Frozen shoulder     L shoulder    GERD (gastroesophageal reflux disease)     HTN (hypertension)     Hx of cancer of uterus     Last assessed: 8/21/15    Hyperlipidemia     Lung mass     diagnosed 9/2016    Malignant neoplasm without specification of site ([facility] )     Skin cancer, basal cell     right eye area    Stage 4 lung cancer ([facility] )        Current Outpatient Prescriptions:     acetaminophen (TYLENOL) 500 mg tablet, Take 2 tablets by mouth 2 (two) times a day, Disp: , Rfl:     Albuterol Sulfate (VENTOLIN HFA IN), Inhale, Disp: , Rfl:     apixaban (ELIQUIS) 5 mg, Take one tablet twice daily, Disp: 180 tablet, Rfl: 3    Calcium Carb-Cholecalciferol 600-800 MG-UNIT TABS, Take 1 tablet by mouth 2 (two) times a day, Disp: , Rfl:     Cholecalciferol (VITAMIN D) 2000 units CAPS, Take by mouth, Disp: , Rfl:     cyanocobalamin (VITAMIN B-12) 100 mcg tablet, Take by mouth daily, Disp: , Rfl:     Linagliptin (TRADJENTA) 5 MG TABS, Take 5 mg by mouth daily, Disp: , Rfl:     metFORMIN (GLUCOPHAGE) 1000 MG tablet, Take 1,000 mg by mouth 2 (two) times a day with meals  , Disp: , Rfl:     metoprolol tartrate (LOPRESSOR) 25 mg tablet, Take 1 tablet (25 mg total) by mouth every 12 (twelve) hours, Disp: 180 tablet, Rfl: 3    omeprazole (PriLOSEC) 20 mg delayed release capsule, Take 1 capsule by mouth daily, Disp: , Rfl:     ondansetron (ZOFRAN-ODT) 4 mg disintegrating tablet, Take 1 tablet (4 mg total) by mouth every 6 (six) hours as needed for nausea or vomiting, Disp: , Rfl: 0    predniSONE 10 mg tablet, Take 1 tablet (10 mg total) by mouth daily, Disp: 90 tablet, Rfl: 1    sotalol (BETAPACE) 80 mg tablet, Take 1 tablet (80 mg total) by mouth every 12 (twelve) hours, Disp: 180 tablet, Rfl: 3    venlafaxine (EFFEXOR) 75 mg tablet, Take 0 5 tablets (37 5 mg total) by mouth daily, Disp: 45 tablet, Rfl: 1    Allergies   Allergen Reactions    Cardizem [Diltiazem]      Rash      Statins Other (See Comments)     Severe muscle aching    Amoxicillin Rash         Review of Systems   Constitutional: Negative for activity change and fever  Respiratory: Negative for shortness of breath  Cardiovascular: Negative for chest pain  Musculoskeletal: Positive for arthralgias  Improving  Neurological: Negative for dizziness and headaches  Objective:      /80 (BP Location: Left arm, Patient Position: Sitting, Cuff Size: Standard)   Pulse 80   Resp 16   LMP  (LMP Unknown)          Physical Exam   Constitutional: She is oriented to person, place, and time  She appears well-developed and well-nourished  No distress  HENT:   Head: Normocephalic and atraumatic  Head is without Isidro's sign     Right Ear: Hearing, tympanic membrane, external ear and ear canal normal    Left Ear: Hearing, tympanic membrane, external ear and ear canal normal    Mouth/Throat: Uvula is midline, oropharynx is clear and moist and mucous membranes are normal        Eyes: Conjunctivae are normal    Neck: Normal range of motion  Neck supple  No thyromegaly present  Cardiovascular: Normal rate, regular rhythm and normal heart sounds  Exam reveals no gallop and no friction rub  No murmur heard  Pulmonary/Chest: Effort normal and breath sounds normal  No respiratory distress  She has no wheezes  She has no rales  Musculoskeletal:        Right wrist: Normal         Left wrist: Normal         Arms:       Legs:  Lymphadenopathy:     She has no cervical adenopathy  Neurological: She is alert and oriented to person, place, and time  Skin: She is not diaphoretic  Psychiatric: She has a normal mood and affect  Her behavior is normal  Judgment and thought content normal    Nursing note and vitals reviewed

## 2018-07-16 RX ORDER — SODIUM CHLORIDE 9 MG/ML
20 INJECTION, SOLUTION INTRAVENOUS CONTINUOUS
Status: DISCONTINUED | OUTPATIENT
Start: 2018-07-17 | End: 2018-07-20 | Stop reason: HOSPADM

## 2018-07-17 ENCOUNTER — HOSPITAL ENCOUNTER (OUTPATIENT)
Dept: INFUSION CENTER | Facility: CLINIC | Age: 68
Discharge: HOME | End: 2018-07-17
Payer: MEDICARE

## 2018-07-17 VITALS
BODY MASS INDEX: 37.22 KG/M2 | WEIGHT: 218 LBS | SYSTOLIC BLOOD PRESSURE: 128 MMHG | DIASTOLIC BLOOD PRESSURE: 68 MMHG | OXYGEN SATURATION: 97 % | HEART RATE: 62 BPM | TEMPERATURE: 98.2 F | HEIGHT: 64 IN | RESPIRATION RATE: 20 BRPM

## 2018-07-17 PROCEDURE — 96413 CHEMO IV INFUSION 1 HR: CPT

## 2018-07-17 RX ADMIN — SODIUM CHLORIDE 240 MG: 9 INJECTION, SOLUTION INTRAVENOUS at 12:41

## 2018-07-17 RX ADMIN — SODIUM CHLORIDE 20 ML/HR: 9 INJECTION, SOLUTION INTRAVENOUS at 12:41

## 2018-07-17 NOTE — PROGRESS NOTES
Patient tolerated opdivo infusion without incident  Peripheral IV removed as per protocol  Patient aware of next appointment  AVS declined

## 2018-07-17 NOTE — PROGRESS NOTES
Patient here for opdivo infusion  Currrently offers no complaints  Labs within parameters to treat  Peripheral IV inserted without incident

## 2018-07-30 RX ORDER — SODIUM CHLORIDE 9 MG/ML
20 INJECTION, SOLUTION INTRAVENOUS CONTINUOUS
Status: DISCONTINUED | OUTPATIENT
Start: 2018-07-31 | End: 2018-08-03 | Stop reason: HOSPADM

## 2018-07-31 ENCOUNTER — HOSPITAL ENCOUNTER (OUTPATIENT)
Dept: INFUSION CENTER | Facility: CLINIC | Age: 68
Discharge: HOME | End: 2018-07-31
Payer: MEDICARE

## 2018-07-31 VITALS
TEMPERATURE: 98.5 F | DIASTOLIC BLOOD PRESSURE: 70 MMHG | HEART RATE: 65 BPM | SYSTOLIC BLOOD PRESSURE: 132 MMHG | OXYGEN SATURATION: 99 % | HEIGHT: 64 IN | RESPIRATION RATE: 18 BRPM | WEIGHT: 221 LBS | BODY MASS INDEX: 37.73 KG/M2

## 2018-07-31 PROCEDURE — 96413 CHEMO IV INFUSION 1 HR: CPT

## 2018-07-31 RX ADMIN — SODIUM CHLORIDE 240 MG: 9 INJECTION, SOLUTION INTRAVENOUS at 12:46

## 2018-07-31 RX ADMIN — SODIUM CHLORIDE 20 ML/HR: 0.9 INJECTION, SOLUTION INTRAVENOUS at 12:45

## 2018-07-31 NOTE — PROGRESS NOTES
Patient here for Opdivo infusion  Patient presents offering no complaints  VSS  Labs per MD note to be drawn monthly  Peripheral IV inserted without incident

## 2018-07-31 NOTE — PROGRESS NOTES
Patient tolerated Opdivo infusion without incident  Peripheral IV removed  Patient aware of next appointment  AVS offered and declined

## 2018-08-06 ENCOUNTER — APPOINTMENT (OUTPATIENT)
Dept: LAB | Facility: CLINIC | Age: 68
End: 2018-08-06
Payer: MEDICARE

## 2018-08-06 DIAGNOSIS — C41.9 MALIGNANT NEOPLASM OF BONE AND ARTICULAR CARTILAGE (HCC): ICD-10-CM

## 2018-08-06 LAB
ALBUMIN SERPL BCP-MCNC: 3.1 G/DL (ref 3.5–5)
ALP SERPL-CCNC: 90 U/L (ref 46–116)
ALT SERPL W P-5'-P-CCNC: 22 U/L (ref 12–78)
ANION GAP SERPL CALCULATED.3IONS-SCNC: 9 MMOL/L (ref 4–13)
AST SERPL W P-5'-P-CCNC: 14 U/L (ref 5–45)
BASOPHILS # BLD AUTO: 0.05 THOUSANDS/ΜL (ref 0–0.1)
BASOPHILS NFR BLD AUTO: 1 % (ref 0–1)
BILIRUB SERPL-MCNC: 0.5 MG/DL (ref 0.2–1)
BUN SERPL-MCNC: 17 MG/DL (ref 5–25)
CALCIUM SERPL-MCNC: 9.1 MG/DL (ref 8.3–10.1)
CHLORIDE SERPL-SCNC: 103 MMOL/L (ref 100–108)
CO2 SERPL-SCNC: 27 MMOL/L (ref 21–32)
CREAT SERPL-MCNC: 0.95 MG/DL (ref 0.6–1.3)
EOSINOPHIL # BLD AUTO: 0.19 THOUSAND/ΜL (ref 0–0.61)
EOSINOPHIL NFR BLD AUTO: 2 % (ref 0–6)
ERYTHROCYTE [DISTWIDTH] IN BLOOD BY AUTOMATED COUNT: 13.3 % (ref 11.6–15.1)
GFR SERPL CREATININE-BSD FRML MDRD: 61 ML/MIN/1.73SQ M
GLUCOSE SERPL-MCNC: 175 MG/DL (ref 65–140)
HCT VFR BLD AUTO: 37.8 % (ref 34.8–46.1)
HGB BLD-MCNC: 11.7 G/DL (ref 11.5–15.4)
IMM GRANULOCYTES # BLD AUTO: 0.04 THOUSAND/UL (ref 0–0.2)
IMM GRANULOCYTES NFR BLD AUTO: 1 % (ref 0–2)
LYMPHOCYTES # BLD AUTO: 1 THOUSANDS/ΜL (ref 0.6–4.47)
LYMPHOCYTES NFR BLD AUTO: 13 % (ref 14–44)
MCH RBC QN AUTO: 26.6 PG (ref 26.8–34.3)
MCHC RBC AUTO-ENTMCNC: 31 G/DL (ref 31.4–37.4)
MCV RBC AUTO: 86 FL (ref 82–98)
MONOCYTES # BLD AUTO: 0.61 THOUSAND/ΜL (ref 0.17–1.22)
MONOCYTES NFR BLD AUTO: 8 % (ref 4–12)
NEUTROPHILS # BLD AUTO: 5.95 THOUSANDS/ΜL (ref 1.85–7.62)
NEUTS SEG NFR BLD AUTO: 75 % (ref 43–75)
NRBC BLD AUTO-RTO: 0 /100 WBCS
PLATELET # BLD AUTO: 260 THOUSANDS/UL (ref 149–390)
PMV BLD AUTO: 10.3 FL (ref 8.9–12.7)
POTASSIUM SERPL-SCNC: 4.3 MMOL/L (ref 3.5–5.3)
PROT SERPL-MCNC: 6.8 G/DL (ref 6.4–8.2)
RBC # BLD AUTO: 4.4 MILLION/UL (ref 3.81–5.12)
SODIUM SERPL-SCNC: 139 MMOL/L (ref 136–145)
WBC # BLD AUTO: 7.84 THOUSAND/UL (ref 4.31–10.16)

## 2018-08-06 PROCEDURE — 85025 COMPLETE CBC W/AUTO DIFF WBC: CPT

## 2018-08-06 PROCEDURE — 36415 COLL VENOUS BLD VENIPUNCTURE: CPT

## 2018-08-06 PROCEDURE — 80053 COMPREHEN METABOLIC PANEL: CPT

## 2018-08-10 ENCOUNTER — OFFICE VISIT (OUTPATIENT)
Dept: HEMATOLOGY ONCOLOGY | Facility: CLINIC | Age: 68
End: 2018-08-10
Payer: MEDICARE

## 2018-08-10 VITALS
SYSTOLIC BLOOD PRESSURE: 151 MMHG | TEMPERATURE: 97.8 F | WEIGHT: 225.3 LBS | HEART RATE: 66 BPM | DIASTOLIC BLOOD PRESSURE: 68 MMHG | BODY MASS INDEX: 38.46 KG/M2 | OXYGEN SATURATION: 98 % | RESPIRATION RATE: 18 BRPM | HEIGHT: 64 IN

## 2018-08-10 DIAGNOSIS — C34.92 ADENOCARCINOMA OF LEFT LUNG, STAGE 4 (HCC): Primary | ICD-10-CM

## 2018-08-10 PROBLEM — C34.91 MALIGNANT NEOPLASM OF UNSPECIFIED PART OF RIGHT BRONCHUS OR LUNG (HCC): Status: RESOLVED | Noted: 2017-05-24 | Resolved: 2018-08-10

## 2018-08-10 PROCEDURE — 99214 OFFICE O/P EST MOD 30 MIN: CPT | Performed by: INTERNAL MEDICINE

## 2018-08-10 NOTE — PROGRESS NOTES
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 of the lung with scapula involvement as mentioned in the history of present illness, she developed pneumonitis with Pembrolizumab requiring discontinuation of therapy for 6 months, she had progression of disease in the left upper lobe as well as the scapula even though she received radiation therapy to the scapula area, she had been on nivolumab 240 mg flat dose every 2 weeks since April 2018, she is tolerating therapy very well along with prednisone 10 mg p o  Daily   She has no side effects denies any dyspnea, cough   Physical examination showed no evidence of abnormal lung sounds  1  Continue nivolumab 240 mg flat dose every 2 weeks along with prednisone 10 mg p o  Daily  2  Follow-up in 1 month CT scan of the chest  3  No need for Zometa or Xgeva because of 1 bony metastases that was radiated  4   TSH to rule out hypothyroidism from nivolumab           HPI: 56-year-old  female who was admitted to the hospital with arrhythmia, was found to have right lower lobe infiltrate in August 2016, treated with antibiotics however repeat chest x-ray showed persistent right lower lobe infiltrate, subsequently the patient had a CT scan of the chest showed a right perihilar mass, subcarinal lymphadenopathy, lytic lesion of the right costovertebral junction at T 10 level, /PET scan on October 2016 showed a right perihilar mass measuring 3 5 cm with SUV of 8 9, subcarinal lymph nodes measuring 3 4 x 2 2 cm was SUV of 9 2 nodule in the left upper lobe lung measuring 2 x 1 1 cm, lytic lesion involving the right 10th costovertebral junction with SUV of 14 4 of T10 showed non-small cell carcinoma with features suggesting of adenocarcinoma positive for CK 7, CK 19, CA-19-9, ANEUDY-3, partially positive for P40, p63, negative for TTF-1 had a history of uterine cancer in 2000 status post hysterectomy did not require radiation or chemotherapy status post bilateral oohrectomy, right knee replacement, but a cystectomy, tonsillectomy used to smoke for 35 years 1 pack per day however quit smoking 21 years ago used hormonal replacement therapy for 3 years history significant for skin cancer in her father and coronary artery disease in mother has 2 healthy children had any skin cancer before last mammogram was on June 2015, She is up-to-date with the colonoscopy an OB/GYN exam   Previous Therapy:  Pembrolizumab 200 mg IV flat dose every 3 weeks initiated in December 2016, Finished in May 2017 secondary to grade 3 pneumonitis radiation therapy to the left scapula in October 2017       current therapy nivolumab 240 mg flat dose every 2 weeks initiated in April 2018 secondary to progression of the disease in the lung and the left scapula, prednisone 10 mg p o  Daily to prevent pneumonitis, will continue the schedule of nivolumab every 2 weeks because of previous history of pneumonitis on Pembrolizumab      Previous Treatment:         Test Results:    Imaging: No results found      Labs:   Lab Results   Component Value Date    WBC 7 84 08/06/2018    HGB 11 7 08/06/2018    HCT 37 8 08/06/2018    MCV 86 08/06/2018     08/06/2018     Lab Results   Component Value Date     08/06/2018    K 4 3 08/06/2018     08/06/2018    CO2 27 08/06/2018    ANIONGAP 9 08/06/2018    BUN 17 08/06/2018    CREATININE 0 95 08/06/2018    GLUCOSE 175 (H) 08/06/2018    GLUF 153 (H) 06/11/2018    CALCIUM 9 1 08/06/2018    AST 14 08/06/2018    ALT 22 08/06/2018    ALKPHOS 90 08/06/2018    PROT 6 8 08/06/2018    BILITOT 0 50 08/06/2018    EGFR 61 08/06/2018       No results found for: IRON, TIBC, FERRITIN    No results found for: [id number]      ROS:   Review of Systems   Constitutional: Negative for activity change, appetite change, diaphoresis, fatigue, fever and unexpected weight change  HENT: Negative for facial swelling, hearing loss, rhinorrhea, sinus pain, sinus pressure, sneezing, sore throat and tinnitus  Eyes: Negative for photophobia, pain, discharge, redness, itching and visual disturbance  Respiratory: Negative for apnea and chest tightness  Cardiovascular: Negative for chest pain, palpitations and leg swelling  Gastrointestinal: Negative for abdominal distention, abdominal pain, blood in stool, constipation, diarrhea, nausea, rectal pain and vomiting  Endocrine: Negative for cold intolerance, heat intolerance, polydipsia and polyphagia  Genitourinary: Negative for difficulty urinating, dyspareunia, frequency, hematuria, pelvic pain and urgency  Musculoskeletal: Negative for arthralgias, back pain, gait problem, joint swelling and myalgias  Skin: Negative for color change, pallor and rash  Allergic/Immunologic: Negative for environmental allergies and food allergies  Neurological: Negative for dizziness, tremors, seizures, syncope, speech difficulty, numbness and headaches  Hematological: Negative for adenopathy  Does not bruise/bleed easily  Psychiatric/Behavioral: Negative for agitation, confusion, dysphoric mood, hallucinations and suicidal ideas  Current Medications: Reviewed  Allergies: Reviewed  PMH/FH/SH:  Reviewed      Physical Exam:    Body surface area is 2 06 meters squared      Wt Readings from Last 3 Encounters:   08/10/18 102 kg (225 lb 4 8 oz)   07/31/18 100 kg (221 lb)   07/17/18 98 9 kg (218 lb)        Temp Readings from Last 3 Encounters:   08/10/18 97 8 °F (36 6 °C) (Tympanic)   07/31/18 98 5 °F (36 9 °C) (Oral)   07/17/18 98 2 °F (36 8 °C) (Oral)        BP Readings from Last 3 Encounters:   08/10/18 151/68   07/31/18 132/70   07/17/18 128/68         Pulse Readings from Last 3 Encounters:   08/10/18 66   07/31/18 65   07/17/18 62        Physical Exam   Constitutional: She is oriented to person, place, and time  She appears well-developed and well-nourished  No distress  HENT:   Head: Normocephalic and atraumatic  Mouth/Throat: Oropharynx is clear and moist  No oropharyngeal exudate  Eyes: Conjunctivae and EOM are normal  Pupils are equal, round, and reactive to light  Neck: Normal range of motion  Neck supple  No tracheal deviation present  No thyromegaly present  Cardiovascular: Normal rate and regular rhythm  Exam reveals no gallop and no friction rub  No murmur heard  Pulmonary/Chest: Effort normal and breath sounds normal  No respiratory distress  She has no wheezes  She has no rales  She exhibits no tenderness  Abdominal: Soft  Bowel sounds are normal  She exhibits no distension and no mass  There is no tenderness  There is no rebound and no guarding  Musculoskeletal: Normal range of motion  She exhibits no edema  Lymphadenopathy:     She has no cervical adenopathy  Neurological: She is alert and oriented to person, place, and time  Skin: Skin is warm and dry  No rash noted  She is not diaphoretic  No erythema  No pallor  Psychiatric: She has a normal mood and affect  Her behavior is normal  Judgment and thought content normal    Vitals reviewed  Goals and Barriers:  Current Goal: Minimize effects of disease  Barriers: None  Patient's Capacity to Self Care:  Patient is able to self care      Code Status: [unfilled]

## 2018-08-13 RX ORDER — SODIUM CHLORIDE 9 MG/ML
20 INJECTION, SOLUTION INTRAVENOUS CONTINUOUS
Status: DISCONTINUED | OUTPATIENT
Start: 2018-08-14 | End: 2018-08-17 | Stop reason: HOSPADM

## 2018-08-14 ENCOUNTER — HOSPITAL ENCOUNTER (OUTPATIENT)
Dept: INFUSION CENTER | Facility: CLINIC | Age: 68
Discharge: HOME | End: 2018-08-14
Payer: MEDICARE

## 2018-08-14 VITALS
DIASTOLIC BLOOD PRESSURE: 64 MMHG | HEART RATE: 63 BPM | BODY MASS INDEX: 37.74 KG/M2 | SYSTOLIC BLOOD PRESSURE: 134 MMHG | OXYGEN SATURATION: 95 % | WEIGHT: 226.5 LBS | RESPIRATION RATE: 18 BRPM | TEMPERATURE: 98.3 F | HEIGHT: 65 IN

## 2018-08-14 DIAGNOSIS — C34.92 ADENOCARCINOMA OF LEFT LUNG, STAGE 4 (HCC): ICD-10-CM

## 2018-08-14 LAB — TSH SERPL DL<=0.05 MIU/L-ACNC: 1.37 UIU/ML (ref 0.36–3.74)

## 2018-08-14 PROCEDURE — 84443 ASSAY THYROID STIM HORMONE: CPT

## 2018-08-14 PROCEDURE — 96413 CHEMO IV INFUSION 1 HR: CPT

## 2018-08-14 RX ADMIN — SODIUM CHLORIDE 20 ML/HR: 0.9 INJECTION, SOLUTION INTRAVENOUS at 11:34

## 2018-08-14 RX ADMIN — SODIUM CHLORIDE 240 MG: 9 INJECTION, SOLUTION INTRAVENOUS at 12:26

## 2018-08-14 NOTE — PROGRESS NOTES
Pt to clinic for opdivo infusion, ths was drawn spoke with Kimi Choi RN and do not have to wait for results, py offers no complaints at this time, aware of next appointment, declines avs

## 2018-08-23 ENCOUNTER — HOSPITAL ENCOUNTER (OUTPATIENT)
Dept: CT IMAGING | Facility: HOSPITAL | Age: 68
Discharge: HOME | End: 2018-08-23
Attending: INTERNAL MEDICINE
Payer: MEDICARE

## 2018-08-23 DIAGNOSIS — C34.92 ADENOCARCINOMA OF LEFT LUNG, STAGE 4 (HCC): ICD-10-CM

## 2018-08-23 PROCEDURE — 71260 CT THORAX DX C+: CPT

## 2018-08-23 RX ADMIN — IOHEXOL 85 ML: 350 INJECTION, SOLUTION INTRAVENOUS at 08:42

## 2018-08-27 RX ORDER — SODIUM CHLORIDE 9 MG/ML
20 INJECTION, SOLUTION INTRAVENOUS CONTINUOUS
Status: DISCONTINUED | OUTPATIENT
Start: 2018-08-28 | End: 2018-08-31 | Stop reason: HOSPADM

## 2018-08-28 ENCOUNTER — HOSPITAL ENCOUNTER (OUTPATIENT)
Dept: INFUSION CENTER | Facility: CLINIC | Age: 68
Discharge: HOME | End: 2018-08-28
Payer: MEDICARE

## 2018-08-28 VITALS
DIASTOLIC BLOOD PRESSURE: 62 MMHG | WEIGHT: 228 LBS | TEMPERATURE: 97.5 F | HEART RATE: 59 BPM | OXYGEN SATURATION: 98 % | SYSTOLIC BLOOD PRESSURE: 124 MMHG | BODY MASS INDEX: 37.94 KG/M2 | RESPIRATION RATE: 18 BRPM

## 2018-08-28 PROCEDURE — 96413 CHEMO IV INFUSION 1 HR: CPT

## 2018-08-28 RX ADMIN — SODIUM CHLORIDE 20 ML/HR: 0.9 INJECTION, SOLUTION INTRAVENOUS at 11:30

## 2018-08-28 RX ADMIN — SODIUM CHLORIDE 240 MG: 9 INJECTION, SOLUTION INTRAVENOUS at 12:13

## 2018-08-28 NOTE — PROGRESS NOTES
Patient is here for chemo  She offers no complaints at this time   Labs meet parameters and they are only every 6 weeks per dr's orders

## 2018-08-30 ENCOUNTER — OFFICE VISIT (OUTPATIENT)
Dept: FAMILY MEDICINE CLINIC | Facility: CLINIC | Age: 68
End: 2018-08-30
Payer: MEDICARE

## 2018-08-30 VITALS
OXYGEN SATURATION: 98 % | BODY MASS INDEX: 38.09 KG/M2 | WEIGHT: 228.6 LBS | TEMPERATURE: 97.2 F | HEIGHT: 65 IN | RESPIRATION RATE: 18 BRPM | SYSTOLIC BLOOD PRESSURE: 136 MMHG | HEART RATE: 64 BPM | DIASTOLIC BLOOD PRESSURE: 70 MMHG

## 2018-08-30 DIAGNOSIS — I48.91 ATRIAL FIBRILLATION, UNSPECIFIED TYPE (HCC): ICD-10-CM

## 2018-08-30 DIAGNOSIS — F32.5 MAJOR DEPRESSIVE DISORDER WITH SINGLE EPISODE, IN FULL REMISSION (HCC): ICD-10-CM

## 2018-08-30 DIAGNOSIS — E11.9 TYPE 2 DIABETES MELLITUS WITHOUT COMPLICATION, WITHOUT LONG-TERM CURRENT USE OF INSULIN (HCC): Primary | ICD-10-CM

## 2018-08-30 DIAGNOSIS — I10 BENIGN ESSENTIAL HYPERTENSION: ICD-10-CM

## 2018-08-30 DIAGNOSIS — C34.92 ADENOCARCINOMA OF LEFT LUNG, STAGE 4 (HCC): ICD-10-CM

## 2018-08-30 DIAGNOSIS — E78.2 MIXED HYPERLIPIDEMIA: ICD-10-CM

## 2018-08-30 DIAGNOSIS — K21.9 GASTROESOPHAGEAL REFLUX DISEASE WITHOUT ESOPHAGITIS: ICD-10-CM

## 2018-08-30 PROCEDURE — 99214 OFFICE O/P EST MOD 30 MIN: CPT | Performed by: FAMILY MEDICINE

## 2018-08-30 NOTE — PATIENT INSTRUCTIONS
Chronic Hypertension   WHAT YOU NEED TO KNOW:   What is chronic hypertension? Hypertension is high blood pressure (BP)  Your BP is the force of your blood moving against the walls of your arteries  Normal BP is less than 120/80  Prehypertension is between 120/80 and 139/89  Hypertension is 140/90 or higher  Hypertension causes your BP to get so high that your heart has to work much harder than normal  This can damage your heart  Chronic hypertension is a long-term condition that you can control with a healthy lifestyle or medicines  A controlled blood pressure helps protect your organs, such as your heart, lungs, brain, and kidneys  What increases my risk for chronic hypertension? · Age older than 54 years (men)    · Age older than 72 years (women)    · A family history of hypertension or heart disease     · Obesity or lack of exercise     · Eating too much sodium (salt)    · Stress     · Use of tobacco, alcohol, or illegal drugs     · A medical condition, such as diabetes, high cholesterol, or kidney disease    · Medicines, such as steroids or birth control pills  What are the signs and symptoms of chronic hypertension? You may have no signs or symptoms, or you may have any of the following:  · Headache     · Blurred vision    · Chest pain     · Dizziness or weakness     · Trouble breathing     · Nosebleeds  How is chronic hypertension diagnosed? Your healthcare provider will ask about your symptoms and the medicines you take  He will also ask if you have a family history of high blood pressure and about any health conditions you have  He will also check your blood pressure and weight and examine your heart, lungs, and eyes  You may need any of the following tests:  · Blood tests  may help healthcare providers find the cause of your hypertension  Blood tests can also help find other health problems caused by hypertension  · Urine tests  will be done to check your kidneys    How is chronic hypertension treated? Your healthcare provider will recommend lifestyle changes to lower your BP  You may also need the following medicines:  · Medicine  may be used to help lower your B  You may need more than one type of medicine  Take the medicine exactly as directed  · Diuretics  help decrease extra fluid that collects in your body  This will help lower your BP  You may urinate more often while you take this medicine  · Cholesterol medicine  helps lower your cholesterol level  A low cholesterol level helps prevent heart disease and makes it easier to control your blood pressure  What can I do to manage chronic hypertension? Talk with your healthcare provider about these and other ways to manage chronic hypertension:  · Take your BP at home  Sit and rest for 5 minutes before you take your BP  Extend your arm and support it on a flat surface  Your arm should be at the same level as your heart  Follow the directions that came with your BP monitor  If possible, take at least 2 BP readings each time  Take your BP at least twice a day at the same times each day, such as morning and evening  Keep a record of your BP readings and bring it to your follow-up visits  Ask your healthcare provider what your blood pressure should be  · Limit sodium (salt) as directed  Too much sodium can affect your fluid balance  Check labels to find low-sodium or no-salt-added foods  Some low-sodium foods use potassium salts for flavor  Too much potassium can also cause health problems  Your healthcare provider will tell you how much sodium and potassium are safe for you to have in a day  He or she may recommend that you limit sodium to 2,300 mg a day  · Follow the meal plan recommended by your healthcare provider  A dietitian or your provider can give you more information on low-sodium plans or the DASH (Dietary Approaches to Stop Hypertension) eating plan  The DASH plan is low in sodium, unhealthy fats, and total fat  It is high in potassium, calcium, and fiber  · Exercise to maintain a healthy weight  Exercise at least 30 minutes per day, on most days of the week  This will help decrease your blood pressure  Ask about the best exercise plan for you  · Decrease stress  This may help lower your BP  Learn ways to relax, such as deep breathing or listening to music  · Limit alcohol  Women should limit alcohol to 1 drink a day  Men should limit alcohol to 2 drinks a day  A drink of alcohol is 12 ounces of beer, 5 ounces of wine, or 1½ ounces of liquor  · Do not smoke  Nicotine and other chemicals in cigarettes and cigars can increase your BP and also cause lung damage  Ask your healthcare provider for information if you currently smoke and need help to quit  E-cigarettes or smokeless tobacco still contain nicotine  Talk to your healthcare provider before you use these products  Call 911 for any of the following:   · You have discomfort in your chest that feels like squeezing, pressure, fullness, or pain  · You become confused or have difficulty speaking  · You suddenly feel lightheaded or have trouble breathing  · You have pain or discomfort in your back, neck, jaw, stomach, or arm  When should I seek immediate care? · You have a severe headache or vision loss  · You have weakness in an arm or leg  When should I contact my healthcare provider? · You feel faint, dizzy, confused, or drowsy  · You have been taking your BP medicine and your BP is still higher than your healthcare provider says it should be  · You have questions or concerns about your condition or care  CARE AGREEMENT:   You have the right to help plan your care  Learn about your health condition and how it may be treated  Discuss treatment options with your caregivers to decide what care you want to receive  You always have the right to refuse treatment  The above information is an  only   It is not intended as medical advice for individual conditions or treatments  Talk to your doctor, nurse or pharmacist before following any medical regimen to see if it is safe and effective for you  © 2017 [employer] Information is for End User's use only and may not be sold, redistributed or otherwise used for commercial purposes  All illustrations and images included in CareNotes® are the copyrighted property of [employer]  or [employer]

## 2018-08-30 NOTE — ASSESSMENT & PLAN NOTE
Lab Results   Component Value Date    HGBA1C 7 1 (H) 03/28/2018       No results for input(s): POCGLU in the last 72 hours      Blood Sugar Average: Last 72 hrs:  will get labs done

## 2018-08-30 NOTE — PROGRESS NOTES
Assessment/Plan:    Problem List Items Addressed This Visit     Adenocarcinoma of lung, stage 4 ([facility] )     Stable and doing well  Seeing [place]         Atrial fibrillation ([place] [address] )     Stable   On eliquis and seeing cardiology         Benign essential hypertension     Stable on metoprolol and sotalol         Diabetes mellitus type 2, uncomplicated ([place] [address] ) - Primary     Lab Results   Component Value Date    HGBA1C 7 1 (H) 03/28/2018       No results for input(s): POCGLU in the last 72 hours  Blood Sugar Average: Last 72 hrs:  will get labs done         Relevant Orders    Ambulatory referral to Ophthalmology    Acid reflux     On 20mg omeprazole protective  On prednisone         Hyperlipidemia     Will check labs         Major depressive disorder with single episode, in full remission ([place] [address] )     Stable on effexor               Patient Instructions     Chronic Hypertension   WHAT YOU NEED TO KNOW:   What is chronic hypertension? Hypertension is high blood pressure (BP)  Your BP is the force of your blood moving against the walls of your arteries  Normal BP is less than 120/80  Prehypertension is between 120/80 and 139/89  Hypertension is 140/90 or higher  Hypertension causes your BP to get so high that your heart has to work much harder than normal  This can damage your heart  Chronic hypertension is a long-term condition that you can control with a healthy lifestyle or medicines  A controlled blood pressure helps protect your organs, such as your heart, lungs, brain, and kidneys  What increases my risk for chronic hypertension?    · Age older than 54 years (men)    · Age older than 72 years (women)    · A family history of hypertension or heart disease     · Obesity or lack of exercise     · Eating too much sodium (salt)    · Stress     · Use of tobacco, alcohol, or illegal drugs     · A medical condition, such as diabetes, high cholesterol, or kidney disease    · Medicines, such as steroids or birth control pills  What are the signs and symptoms of chronic hypertension? You may have no signs or symptoms, or you may have any of the following:  · Headache     · Blurred vision    · Chest pain     · Dizziness or weakness     · Trouble breathing     · Nosebleeds  How is chronic hypertension diagnosed? Your healthcare provider will ask about your symptoms and the medicines you take  He will also ask if you have a family history of high blood pressure and about any health conditions you have  He will also check your blood pressure and weight and examine your heart, lungs, and eyes  You may need any of the following tests:  · Blood tests  may help healthcare providers find the cause of your hypertension  Blood tests can also help find other health problems caused by hypertension  · Urine tests  will be done to check your kidneys  How is chronic hypertension treated? Your healthcare provider will recommend lifestyle changes to lower your BP  You may also need the following medicines:  · Medicine  may be used to help lower your B  You may need more than one type of medicine  Take the medicine exactly as directed  · Diuretics  help decrease extra fluid that collects in your body  This will help lower your BP  You may urinate more often while you take this medicine  · Cholesterol medicine  helps lower your cholesterol level  A low cholesterol level helps prevent heart disease and makes it easier to control your blood pressure  What can I do to manage chronic hypertension? Talk with your healthcare provider about these and other ways to manage chronic hypertension:  · Take your BP at home  Sit and rest for 5 minutes before you take your BP  Extend your arm and support it on a flat surface  Your arm should be at the same level as your heart  Follow the directions that came with your BP monitor  If possible, take at least 2 BP readings each time   Take your BP at least twice a day at the same times each day, such as morning and evening  Keep a record of your BP readings and bring it to your follow-up visits  Ask your healthcare provider what your blood pressure should be  · Limit sodium (salt) as directed  Too much sodium can affect your fluid balance  Check labels to find low-sodium or no-salt-added foods  Some low-sodium foods use potassium salts for flavor  Too much potassium can also cause health problems  Your healthcare provider will tell you how much sodium and potassium are safe for you to have in a day  He or she may recommend that you limit sodium to 2,300 mg a day  · Follow the meal plan recommended by your healthcare provider  A dietitian or your provider can give you more information on low-sodium plans or the DASH (Dietary Approaches to Stop Hypertension) eating plan  The DASH plan is low in sodium, unhealthy fats, and total fat  It is high in potassium, calcium, and fiber  · Exercise to maintain a healthy weight  Exercise at least 30 minutes per day, on most days of the week  This will help decrease your blood pressure  Ask about the best exercise plan for you  · Decrease stress  This may help lower your BP  Learn ways to relax, such as deep breathing or listening to music  · Limit alcohol  Women should limit alcohol to 1 drink a day  Men should limit alcohol to 2 drinks a day  A drink of alcohol is 12 ounces of beer, 5 ounces of wine, or 1½ ounces of liquor  · Do not smoke  Nicotine and other chemicals in cigarettes and cigars can increase your BP and also cause lung damage  Ask your healthcare provider for information if you currently smoke and need help to quit  E-cigarettes or smokeless tobacco still contain nicotine  Talk to your healthcare provider before you use these products  Call 911 for any of the following:   · You have discomfort in your chest that feels like squeezing, pressure, fullness, or pain  · You become confused or have difficulty speaking      · You suddenly feel lightheaded or have trouble breathing  · You have pain or discomfort in your back, neck, jaw, stomach, or arm  When should I seek immediate care? · You have a severe headache or vision loss  · You have weakness in an arm or leg  When should I contact my healthcare provider? · You feel faint, dizzy, confused, or drowsy  · You have been taking your BP medicine and your BP is still higher than your healthcare provider says it should be  · You have questions or concerns about your condition or care  CARE AGREEMENT:   You have the right to help plan your care  Learn about your health condition and how it may be treated  Discuss treatment options with your caregivers to decide what care you want to receive  You always have the right to refuse treatment  The above information is an  only  It is not intended as medical advice for individual conditions or treatments  Talk to your doctor, nurse or pharmacist before following any medical regimen to see if it is safe and effective for you  © 2017 [address] Information is for End User's use only and may not be sold, redistributed or otherwise used for commercial purposes  All illustrations and images included in CareNotes® are the copyrighted property of A D [employer] M [employer]  or [employer]  No Follow-up on file  Subjective:      Patient ID: Monico Valente is a 77 y o  female  Chief Complaint   Patient presents with    Follow-up     Patient here for 4 month follow up on Type II Diabetes, Hypertension, Acid reflux,        Here for follow   Didn't get labs done      Hypertension   This is a chronic problem  The current episode started today  The problem is unchanged  The problem is controlled  Pertinent negatives include no anxiety, blurred vision, chest pain or malaise/fatigue  There are no associated agents to hypertension   Risk factors for coronary artery disease include dyslipidemia and diabetes mellitus  Past treatments include beta blockers  The current treatment provides significant improvement  There are no compliance problems  Diabetes   She presents for her follow-up diabetic visit  She has type 2 diabetes mellitus  Her disease course has been stable  There are no hypoglycemic associated symptoms  There are no diabetic associated symptoms  Pertinent negatives for diabetes include no blurred vision, no chest pain, no foot ulcerations, no polydipsia, no polyphagia, no polyuria and no visual change  There are no hypoglycemic complications  Symptoms are stable  There are no diabetic complications  Risk factors for coronary artery disease include diabetes mellitus  Current diabetic treatment includes oral agent (dual therapy)  She is compliant with treatment all of the time  Her weight is stable  There is no change in her home blood glucose trend  The following portions of the patient's history were reviewed and updated as appropriate: allergies, current medications, past family history, past medical history, past social history, past surgical history and problem list     Review of Systems   Constitutional: Negative  Negative for malaise/fatigue  HENT: Negative  Eyes: Negative  Negative for blurred vision  Respiratory: Negative  Cardiovascular: Negative for chest pain  Gastrointestinal: Negative  Endocrine: Negative for polydipsia, polyphagia and polyuria  Musculoskeletal: Negative  Skin: Negative  Allergic/Immunologic: Negative  Neurological: Negative  Hematological: Negative  Psychiatric/Behavioral: Negative            Current Outpatient Prescriptions   Medication Sig Dispense Refill    acetaminophen (TYLENOL) 500 mg tablet Take 2 tablets by mouth 2 (two) times a day      Albuterol Sulfate (VENTOLIN HFA IN) Inhale      apixaban (ELIQUIS) 5 mg Take one tablet twice daily 180 tablet 3    Calcium Carb-Cholecalciferol 600-800 MG-UNIT TABS Take 1 tablet by mouth 2 (two) times a day      Cholecalciferol (VITAMIN D) 2000 units CAPS Take by mouth      cyanocobalamin (VITAMIN B-12) 100 mcg tablet Take by mouth daily      Linagliptin (TRADJENTA) 5 MG TABS Take 5 mg by mouth daily      metFORMIN (GLUCOPHAGE) 1000 MG tablet Take 1,000 mg by mouth 2 (two) times a day with meals   metoprolol tartrate (LOPRESSOR) 25 mg tablet Take 1 tablet (25 mg total) by mouth every 12 (twelve) hours 180 tablet 3    omeprazole (PriLOSEC) 20 mg delayed release capsule Take 1 capsule by mouth daily      ondansetron (ZOFRAN-ODT) 4 mg disintegrating tablet Take 1 tablet (4 mg total) by mouth every 6 (six) hours as needed for nausea or vomiting  0    predniSONE 10 mg tablet Take 1 tablet (10 mg total) by mouth daily 90 tablet 1    sotalol (BETAPACE) 80 mg tablet Take 1 tablet (80 mg total) by mouth every 12 (twelve) hours 180 tablet 3    venlafaxine (EFFEXOR) 75 mg tablet Take 0 5 tablets (37 5 mg total) by mouth daily 45 tablet 1     No current facility-administered medications for this visit  Facility-Administered Medications Ordered in Other Visits   Medication Dose Route Frequency Provider Last Rate Last Dose    alteplase (CATHFLO) injection 2 mg  2 mg Intracatheter PRN Haile Toscano MD        heparin lock flush 100 units/mL injection 300 Units  300 Units Intracatheter PRN Haile Toscano MD        sodium chloride 0 9 % infusion  20 mL/hr Intravenous Continuous Haile Toscano MD   Stopped at 08/28/18 1252       Objective:    /70   Pulse 64   Temp (!) 97 2 °F (36 2 °C)   Resp 18   Ht 5' 5" (1 651 m)   Wt 104 kg (228 lb 9 6 oz)   LMP  (LMP Unknown)   SpO2 98%   BMI 38 04 kg/m²        Physical Exam   Constitutional: She appears well-developed and well-nourished  Eyes: Pupils are equal, round, and reactive to light  Neck: Normal range of motion  Neck supple  Cardiovascular: Normal rate, regular rhythm, normal heart sounds and intact distal pulses  Pulmonary/Chest: Effort normal and breath sounds normal    Abdominal: Soft  Bowel sounds are normal    Musculoskeletal: Normal range of motion  Neurological: She is alert  Skin: Skin is warm  Nursing note and vitals reviewed               Leilani Suh, DO

## 2018-08-31 NOTE — PROGRESS NOTES
Hematology/Oncology Outpatient Follow- up Note  Elizabeth Griffith 77 y o  female MRN: @ Encounter: [id number]        Date:  9/4/2018      Assessment / Plan:    Stage 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 of the lung with scapula involvement dx 8/2016  She developed pneumonitis with Pembrolizumab requiring discontinuation of therapy for 6 months  She had progression of disease in the left upper lobe as well as the scapula even though she received radiation therapy to the scapula area  Nivolumab 240 mg flat dose every 2 weeks along with prednisone 10 mg p o  Daily  initiated April 2018  CT chest 8/23/2018 compared to Chest CT from 6/26/2018, and PET/CT from 3/29/2018 was stable  Continue nivolumab 240 mg flat dose every 2 weeks along with prednisone 10 mg p o  Daily secondary to history of pneumonitis on pembrolizumab  Dose #12 due 9/11/2018  No need for Zometa or Xgeva because of 1 bony metastases that was radiated  TSH checked monthly to rule out hypothyroidism from nivolumab remains normal            HPI: 25-year-old  female who was admitted to the hospital with arrhythmia, was found to have right lower lobe infiltrate in August 2016, treated with antibiotics however repeat chest x-ray showed persistent right lower lobe infiltrate  Subsequently the patient had a CT scan of the chest showed a right perihilar mass, subcarinal lymphadenopathy, lytic lesion of the right costovertebral junction at T10 level  PET scan on October 2016 showed a right perihilar mass measuring 3 5 cm with SUV of 8 9, subcarinal lymph nodes measuring 3 4 x 2 2 cm with SUV of 9 2  Nodule in the left upper lobe lung measured 2 x 1 1 cm  There was a lytic lesion involving the right 10th costovertebral junction with SUV of 14 4      Biopsy showed non-small cell carcinoma with features suggesting of adenocarcinoma positive for CK 7, CK 19, CA-19-9, ANEUDY-3, partially positive for P40, p63, negative for TTF-1  She had a history of uterine cancer in 2000 status post hysterectomy and did not require radiation or chemotherapy  She is status post bilateral oophorectomy, right knee replacement, but a cystectomy, tonsillectomy  She used to smoke for 35 years 1 pack per day however quit smoking 21 years ago  She used hormonal replacement therapy for 3 years  She has a family history significant for skin cancer in her father and coronary artery disease in mother  She has 2 healthy children     Previous Therapy:    Pembrolizumab 200 mg IV flat dose every 3 weeks initiated in December 2016, Finished in May 2017 secondary to grade 3 pneumonitis   radiation therapy to the left scapula in October 2017        Current Therapy: nivolumab 240 mg flat dose every 2 weeks initiated in April 2018 secondary to progression of the disease in the lung and the left scapula, prednisone 10 mg p o  daily to prevent pneumonitis, will continue the schedule of nivolumab every 2 weeks because of previous history of pneumonitis on Pembrolizumab    Interval History:  CT chest 8/23/2018: Unchanged part solid 2 7 x 1 3 cm left upper lobe pulmonary nodule consistent with patient's primary lung cancer    Several additional small pulmonary nodules are also unchanged  Unchanged lytic metastasis in the left scapula and at the right costovertebral junction of T10    Unchanged 1 4 x 1 6 cm cystic lesion in the pancreatic body        Test Results:        Labs:   Lab Results   Component Value Date    HGB 11 7 08/06/2018    HCT 37 8 08/06/2018    MCV 86 08/06/2018     08/06/2018    WBC 7 84 08/06/2018    NRBC 0 08/06/2018     Lab Results   Component Value Date     08/06/2018    K 4 3 08/06/2018     08/06/2018    CO2 27 08/06/2018    ANIONGAP 9 11/23/2015    BUN 17 08/06/2018    CREATININE 0 95 08/06/2018    GLUCOSE 149 (H) 11/23/2015    GLUF 153 (H) 06/11/2018    CALCIUM 9 1 08/06/2018    AST 14 08/06/2018    ALT 22 08/06/2018    ALKPHOS 90 08/06/2018    PROT 6 8 11/23/2015    BILITOT 0 65 11/23/2015    EGFR 61 08/06/2018       Imaging: Ct Chest W Contrast    Result Date: 8/27/2018  Narrative: CT CHEST WITH IV CONTRAST INDICATION:   C34 92: Malignant neoplasm of unspecified part of left bronchus or lung  COMPARISON:  Chest CT from 6/26/2018, and PET/CT from 3/29/2018  TECHNIQUE: CT examination of the chest was performed  Axial, sagittal, and coronal 2D reformatted images were created from the source data and submitted for interpretation  Radiation dose length product (DLP) for this visit:  620 mGy-cm   This examination, like all CT scans performed in the [facility], was performed utilizing techniques to minimize radiation dose exposure, including the use of iterative reconstruction and automated exposure control  IV Contrast:  85 mL of iohexol (OMNIPAQUE) FINDINGS: LUNGS:  Unchanged part solid 2 7 x 1 3 cm left upper lobe pulmonary nodule (series 2 images 18 through 23 ) There is also unchanged, adjacent 4 mm left upper lobe solid pulmonary nodule (series 2 image 21 ) Unchanged 10 mm left lower lobe basilar pulmonary nodule (series 2 image 43 ) Unchanged right middle lobe scarring  PLEURA:  Unremarkable  HEART/GREAT VESSELS:  Unremarkable for patient's age  MEDIASTINUM AND ANABEL:  Unremarkable  CHEST WALL AND LOWER NECK: Again seen are multiple subcentimeter thyroid nodules  Incidental discovery of one or more thyroid nodule(s) measuring less than 1 5 cm and without suspicious features is noted in this patient who is above 28years old; according to guidelines published in the February 2015 white paper on incidental thyroid nodules in the Journal of the [employer] of Radiology Tiffany Crouch), no further evaluation is recommended  VISUALIZED STRUCTURES IN THE UPPER ABDOMEN:  Several too small to characterize hypodensities in the liver are unchanged  There are cholecystectomy clips    Unchanged 1 4 x 1 6 cm cystic lesion in the pancreatic body  (Series 2 image 60 ) OSSEOUS STRUCTURES:  Unchanged lytic metastasis of the left scapula, with bony destruction (series 2 image 16 )  This measures approximately 4 1 x 2 5 cm  There is also unchanged lytic metastasis at the right costovertebral junction of T10 measuring approximately 1 7 x 1 2 cm (series 601 image 88)     Impression: Unchanged part solid 2 7 x 1 3 cm left upper lobe pulmonary nodule consistent with patient's primary lung cancer (series 2 images 18 through 23 ) Several additional small pulmonary nodules are also unchanged  Unchanged lytic metastasis in the left scapula and at the right costovertebral junction of T10  Unchanged 1 4 x 1 6 cm cystic lesion in the pancreatic body  (Series 2 image 60 ) Workstation performed: [id number]           ROS:  As mentioned in HPI & Interval History otherwise 14 point ROS negative  Allergies: Allergies   Allergen Reactions    Cardizem [Diltiazem]      Rash      Statins Other (See Comments)     Severe muscle aching    Amoxicillin Rash     Current Medications: Reviewed  PMH/FH/SH:  Reviewed      Physical Exam:    There is no height or weight on file to calculate BSA  Ht Readings from Last 3 Encounters:   08/30/18 5' 5" (1 651 m)   08/14/18 5' 5" (1 651 m)   08/10/18 5' 4 17" (1 63 m)        Wt Readings from Last 3 Encounters:   08/30/18 104 kg (228 lb 9 6 oz)   08/28/18 103 kg (228 lb)   08/14/18 103 kg (226 lb 8 oz)        Temp Readings from Last 3 Encounters:   08/30/18 (!) 97 2 °F (36 2 °C)   08/28/18 97 5 °F (36 4 °C) (Oral)   08/14/18 98 3 °F (36 8 °C) (Oral)        BP Readings from Last 3 Encounters:   08/30/18 136/70   08/28/18 124/62   08/14/18 134/64           Physical Exam   Constitutional: She is oriented to person, place, and time  She appears well-developed and well-nourished  No distress  HENT:   Head: Normocephalic and atraumatic     Nose: Nose normal    Mouth/Throat: Oropharynx is clear and moist    Eyes: Conjunctivae and EOM are normal  Pupils are equal, round, and reactive to light  No scleral icterus  Neck: Normal range of motion  Neck supple  No tracheal deviation present  No thyromegaly present  Cardiovascular: Normal rate, regular rhythm, normal heart sounds and intact distal pulses  Exam reveals no gallop and no friction rub  No murmur heard  Pulmonary/Chest: Effort normal and breath sounds normal  No stridor  No respiratory distress  She has no wheezes  She has no rales  She exhibits no tenderness  Abdominal: Soft  Bowel sounds are normal  She exhibits no distension and no mass  There is no tenderness  There is no rebound and no guarding  Musculoskeletal: Normal range of motion  She exhibits no edema, tenderness or deformity  Lymphadenopathy:     She has no cervical adenopathy  Neurological: She is alert and oriented to person, place, and time  No cranial nerve deficit  Coordination normal    Skin: Skin is warm and dry  No rash noted  She is not diaphoretic  No erythema  No pallor  Psychiatric: She has a normal mood and affect  Her behavior is normal  Judgment and thought content normal        ECO      Goals and Barriers:  Current Goal:   Prolong Survival from Cancer/ Have good QOL  Barriers: None  Patient's Capacity to Self Care:  Patient is able to self care      Emergency Contacts:    Tiera Newsome, ,

## 2018-09-04 ENCOUNTER — OFFICE VISIT (OUTPATIENT)
Dept: HEMATOLOGY ONCOLOGY | Facility: CLINIC | Age: 68
End: 2018-09-04
Payer: MEDICARE

## 2018-09-04 VITALS
HEIGHT: 65 IN | RESPIRATION RATE: 16 BRPM | TEMPERATURE: 99.2 F | HEART RATE: 71 BPM | OXYGEN SATURATION: 95 % | DIASTOLIC BLOOD PRESSURE: 78 MMHG | WEIGHT: 226 LBS | BODY MASS INDEX: 37.65 KG/M2 | SYSTOLIC BLOOD PRESSURE: 130 MMHG

## 2018-09-04 DIAGNOSIS — C34.92 ADENOCARCINOMA OF LEFT LUNG, STAGE 4 (HCC): Primary | ICD-10-CM

## 2018-09-04 PROCEDURE — 99214 OFFICE O/P EST MOD 30 MIN: CPT | Performed by: PHYSICIAN ASSISTANT

## 2018-09-05 ENCOUNTER — OFFICE VISIT (OUTPATIENT)
Dept: PALLIATIVE MEDICINE | Facility: CLINIC | Age: 68
End: 2018-09-05
Payer: MEDICARE

## 2018-09-05 VITALS
HEART RATE: 62 BPM | SYSTOLIC BLOOD PRESSURE: 145 MMHG | HEIGHT: 64 IN | RESPIRATION RATE: 18 BRPM | TEMPERATURE: 97.6 F | DIASTOLIC BLOOD PRESSURE: 80 MMHG | BODY MASS INDEX: 38.79 KG/M2 | WEIGHT: 227.2 LBS

## 2018-09-05 DIAGNOSIS — F32.5 MAJOR DEPRESSIVE DISORDER WITH SINGLE EPISODE, IN FULL REMISSION (HCC): ICD-10-CM

## 2018-09-05 DIAGNOSIS — C34.92 ADENOCARCINOMA OF LEFT LUNG, STAGE 4 (HCC): Primary | ICD-10-CM

## 2018-09-05 PROCEDURE — 99213 OFFICE O/P EST LOW 20 MIN: CPT | Performed by: NURSE PRACTITIONER

## 2018-09-05 NOTE — PROGRESS NOTES
Palliative and Supportive Care   Maritza Griffith 77 y o  female [id number]    Assessment/Plan:  1  Adenocarcinoma of left lung, stage 4 ([facility] [address] )    2  Major depressive disorder with single episode, in full remission ([facility] [address] )        Symptom management:   - continue Tylenol as needed for occasional pains  - continue venlafaxine 37 5mg daily for anxiety   - call if new symptoms arise    Urinary incontinence: worsening of stress incontinence? Med side effect? 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  Previously on pembrolizumab, discontinued due to pneumonitis  S/p palliative RT to the thoracic spinal met in 12/2016 and to lesion of left scapular region in 10/2017  Found to have progression of left scapular met and of nodule of left upper lobe of lung  Started on nivolumab with low-dose prednisone for prevention of pneumonitis or other side effects  She has been tolerating this well  Saw Dr Olu Aponte yesterday- follow up scans were stable, therefore she will continue on nivolumab  She has been feeling very well  She is not having pain  She did have a fall prompting ER visit in July but pains from this have resolved  She is not losing weight  She remains on venlafaxine  Mood has been very good, anxiety is controlled, and she is sleeping well at night  "I feel happy " She takes one nap daily in afternoon  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  She has not been experiencing side effects from the immunotherapy  She notes worsened urinary incontinence, which occurs primarily when she stands up  She notes a history of stress incontinence   She has discussed with her PCP  The following portions of the patient's history were reviewed and updated as appropriate: allergies, current medications, past family history, past medical history, past social history, past surgical history and problem list       Visit Information    Accompanied By: No one  Source of History: Self  History Limitations: None    ROS  Review of Systems   Constitutional: Negative  Respiratory: Negative  Gastrointestinal: Negative for nausea  Genitourinary:        Urinary incontinence   Musculoskeletal: Negative  Psychiatric/Behavioral: Negative for dysphoric mood and sleep disturbance  The patient is not nervous/anxious  Objective     Physical Exam   Constitutional: She is oriented to person, place, and time  She appears well-developed and well-nourished  She is cooperative  No distress  Pulmonary/Chest: Effort normal    Neurological: She is alert and oriented to person, place, and time  Skin: Skin is warm and dry  Psychiatric: She has a normal mood and affect   Her behavior is normal  Cognition and memory are normal          /80 (BP Location: Right arm, Patient Position: Sitting, Cuff Size: Standard)   Pulse 62   Temp 97 6 °F (36 4 °C) (Oral)   Resp 18   Ht 5' 4 1" (1 628 m)   Wt 103 kg (227 lb 3 2 oz)   LMP  (LMP Unknown)   BMI 38 88 kg/m²       - ECOG Performance Status: 0      Current Outpatient Prescriptions:     acetaminophen (TYLENOL) 500 mg tablet, Take 2 tablets by mouth 2 (two) times a day as needed , Disp: , Rfl:     Albuterol Sulfate (VENTOLIN HFA IN), Inhale, Disp: , Rfl:     apixaban (ELIQUIS) 5 mg, Take one tablet twice daily, Disp: 180 tablet, Rfl: 3    Calcium Carb-Cholecalciferol 600-800 MG-UNIT TABS, Take 1 tablet by mouth 2 (two) times a day, Disp: , Rfl:     Cholecalciferol (VITAMIN D) 2000 units CAPS, Take by mouth, Disp: , Rfl:     cyanocobalamin (VITAMIN B-12) 100 mcg tablet, Take by mouth daily, Disp: , Rfl:     Linagliptin (TRADJENTA) 5 MG TABS, Take 5 mg by mouth daily, Disp: , Rfl:     metFORMIN (GLUCOPHAGE) 1000 MG tablet, Take 1,000 mg by mouth 2 (two) times a day with meals  , Disp: , Rfl:     metoprolol tartrate (LOPRESSOR) 25 mg tablet, Take 1 tablet (25 mg total) by mouth every 12 (twelve) hours, Disp: 180 tablet, Rfl: 3    omeprazole (PriLOSEC) 20 mg delayed release capsule, Take 1 capsule by mouth daily, Disp: , Rfl:     ondansetron (ZOFRAN-ODT) 4 mg disintegrating tablet, Take 1 tablet (4 mg total) by mouth every 6 (six) hours as needed for nausea or vomiting, Disp: , Rfl: 0    predniSONE 10 mg tablet, Take 1 tablet (10 mg total) by mouth daily, Disp: 90 tablet, Rfl: 1    sotalol (BETAPACE) 80 mg tablet, Take 1 tablet (80 mg total) by mouth every 12 (twelve) hours, Disp: 180 tablet, Rfl: 3    venlafaxine (EFFEXOR) 75 mg tablet, Take 0 5 tablets (37 5 mg total) by mouth daily, Disp: 45 tablet, Rfl: [address] and Supportive Care  [phone]        Representatives have queried the patient's controlled substance dispensing history in the Prescription Drug Monitoring Program in compliance with the [facility] regulations before I have prescribed any controlled substances  The prescription history is consistent with prescribed therapy and our practice policies

## 2018-09-10 ENCOUNTER — APPOINTMENT (OUTPATIENT)
Dept: LAB | Facility: CLINIC | Age: 68
End: 2018-09-10
Payer: MEDICARE

## 2018-09-10 DIAGNOSIS — E11.9 TYPE 2 DIABETES MELLITUS WITHOUT COMPLICATION, WITHOUT LONG-TERM CURRENT USE OF INSULIN (HCC): ICD-10-CM

## 2018-09-10 DIAGNOSIS — I10 BENIGN ESSENTIAL HYPERTENSION: ICD-10-CM

## 2018-09-10 DIAGNOSIS — E78.5 HYPERLIPIDEMIA, UNSPECIFIED HYPERLIPIDEMIA TYPE: ICD-10-CM

## 2018-09-10 DIAGNOSIS — C34.92 ADENOCARCINOMA OF LEFT LUNG, STAGE 4 (HCC): ICD-10-CM

## 2018-09-10 LAB
ALBUMIN SERPL BCP-MCNC: 3 G/DL (ref 3.5–5)
ALP SERPL-CCNC: 86 U/L (ref 46–116)
ALT SERPL W P-5'-P-CCNC: 24 U/L (ref 12–78)
ANION GAP SERPL CALCULATED.3IONS-SCNC: 6 MMOL/L (ref 4–13)
AST SERPL W P-5'-P-CCNC: 15 U/L (ref 5–45)
BASOPHILS # BLD AUTO: 0.06 THOUSANDS/ΜL (ref 0–0.1)
BASOPHILS NFR BLD AUTO: 1 % (ref 0–1)
BILIRUB SERPL-MCNC: 0.3 MG/DL (ref 0.2–1)
BUN SERPL-MCNC: 20 MG/DL (ref 5–25)
CALCIUM SERPL-MCNC: 9.6 MG/DL (ref 8.3–10.1)
CHLORIDE SERPL-SCNC: 104 MMOL/L (ref 100–108)
CHOLEST SERPL-MCNC: 207 MG/DL (ref 50–200)
CO2 SERPL-SCNC: 30 MMOL/L (ref 21–32)
CREAT SERPL-MCNC: 1.11 MG/DL (ref 0.6–1.3)
CREAT UR-MCNC: 121 MG/DL
EOSINOPHIL # BLD AUTO: 0.14 THOUSAND/ΜL (ref 0–0.61)
EOSINOPHIL NFR BLD AUTO: 2 % (ref 0–6)
ERYTHROCYTE [DISTWIDTH] IN BLOOD BY AUTOMATED COUNT: 13.3 % (ref 11.6–15.1)
EST. AVERAGE GLUCOSE BLD GHB EST-MCNC: 171 MG/DL
GFR SERPL CREATININE-BSD FRML MDRD: 50 ML/MIN/1.73SQ M
GLUCOSE P FAST SERPL-MCNC: 162 MG/DL (ref 65–99)
HBA1C MFR BLD: 7.6 % (ref 4.2–6.3)
HCT VFR BLD AUTO: 38.9 % (ref 34.8–46.1)
HDLC SERPL-MCNC: 43 MG/DL (ref 40–60)
HGB BLD-MCNC: 12.3 G/DL (ref 11.5–15.4)
IMM GRANULOCYTES # BLD AUTO: 0.05 THOUSAND/UL (ref 0–0.2)
IMM GRANULOCYTES NFR BLD AUTO: 1 % (ref 0–2)
LDLC SERPL CALC-MCNC: 119 MG/DL (ref 0–100)
LYMPHOCYTES # BLD AUTO: 1.27 THOUSANDS/ΜL (ref 0.6–4.47)
LYMPHOCYTES NFR BLD AUTO: 17 % (ref 14–44)
MCH RBC QN AUTO: 26.7 PG (ref 26.8–34.3)
MCHC RBC AUTO-ENTMCNC: 31.6 G/DL (ref 31.4–37.4)
MCV RBC AUTO: 85 FL (ref 82–98)
MICROALBUMIN UR-MCNC: 10.6 MG/L (ref 0–20)
MICROALBUMIN/CREAT 24H UR: 9 MG/G CREATININE (ref 0–30)
MONOCYTES # BLD AUTO: 0.53 THOUSAND/ΜL (ref 0.17–1.22)
MONOCYTES NFR BLD AUTO: 7 % (ref 4–12)
NEUTROPHILS # BLD AUTO: 5.46 THOUSANDS/ΜL (ref 1.85–7.62)
NEUTS SEG NFR BLD AUTO: 72 % (ref 43–75)
NRBC BLD AUTO-RTO: 0 /100 WBCS
PLATELET # BLD AUTO: 271 THOUSANDS/UL (ref 149–390)
PMV BLD AUTO: 10.3 FL (ref 8.9–12.7)
POTASSIUM SERPL-SCNC: 4.1 MMOL/L (ref 3.5–5.3)
PROT SERPL-MCNC: 6.8 G/DL (ref 6.4–8.2)
RBC # BLD AUTO: 4.6 MILLION/UL (ref 3.81–5.12)
SODIUM SERPL-SCNC: 140 MMOL/L (ref 136–145)
TRIGL SERPL-MCNC: 227 MG/DL
TSH SERPL DL<=0.05 MIU/L-ACNC: 1.89 UIU/ML (ref 0.36–3.74)
WBC # BLD AUTO: 7.51 THOUSAND/UL (ref 4.31–10.16)

## 2018-09-10 PROCEDURE — 85025 COMPLETE CBC W/AUTO DIFF WBC: CPT

## 2018-09-10 PROCEDURE — 82043 UR ALBUMIN QUANTITATIVE: CPT

## 2018-09-10 PROCEDURE — 80061 LIPID PANEL: CPT

## 2018-09-10 PROCEDURE — 82570 ASSAY OF URINE CREATININE: CPT

## 2018-09-10 PROCEDURE — 80053 COMPREHEN METABOLIC PANEL: CPT

## 2018-09-10 PROCEDURE — 36415 COLL VENOUS BLD VENIPUNCTURE: CPT

## 2018-09-10 PROCEDURE — 84443 ASSAY THYROID STIM HORMONE: CPT

## 2018-09-10 PROCEDURE — 83036 HEMOGLOBIN GLYCOSYLATED A1C: CPT

## 2018-09-10 RX ORDER — SODIUM CHLORIDE 9 MG/ML
20 INJECTION, SOLUTION INTRAVENOUS CONTINUOUS
Status: DISCONTINUED | OUTPATIENT
Start: 2018-09-11 | End: 2018-09-14 | Stop reason: HOSPADM

## 2018-09-11 ENCOUNTER — HOSPITAL ENCOUNTER (OUTPATIENT)
Dept: INFUSION CENTER | Facility: CLINIC | Age: 68
Discharge: HOME | End: 2018-09-11
Payer: MEDICARE

## 2018-09-11 VITALS
WEIGHT: 229 LBS | OXYGEN SATURATION: 98 % | DIASTOLIC BLOOD PRESSURE: 68 MMHG | SYSTOLIC BLOOD PRESSURE: 128 MMHG | RESPIRATION RATE: 18 BRPM | TEMPERATURE: 98.1 F | HEART RATE: 64 BPM | BODY MASS INDEX: 39.19 KG/M2

## 2018-09-11 PROCEDURE — 96413 CHEMO IV INFUSION 1 HR: CPT

## 2018-09-11 RX ADMIN — SODIUM CHLORIDE 20 ML/HR: 0.9 INJECTION, SOLUTION INTRAVENOUS at 11:54

## 2018-09-11 RX ADMIN — SODIUM CHLORIDE 240 MG: 9 INJECTION, SOLUTION INTRAVENOUS at 12:39

## 2018-09-11 NOTE — PROGRESS NOTES
LATE NOTE:  Pt to clinic for single agent opdivo therapy for NSCLC  She is tolerating therapy well without s/s of pneumonitis or other IRAE  Therapy given as per orders without incident  Pt confirms next appointment

## 2018-09-24 RX ORDER — SODIUM CHLORIDE 9 MG/ML
20 INJECTION, SOLUTION INTRAVENOUS CONTINUOUS
Status: DISCONTINUED | OUTPATIENT
Start: 2018-09-25 | End: 2018-09-28 | Stop reason: HOSPADM

## 2018-09-25 ENCOUNTER — HOSPITAL ENCOUNTER (OUTPATIENT)
Dept: INFUSION CENTER | Facility: CLINIC | Age: 68
Discharge: HOME | End: 2018-09-25
Payer: MEDICARE

## 2018-09-25 VITALS
TEMPERATURE: 98 F | RESPIRATION RATE: 18 BRPM | HEIGHT: 60 IN | OXYGEN SATURATION: 97 % | WEIGHT: 229.5 LBS | DIASTOLIC BLOOD PRESSURE: 64 MMHG | SYSTOLIC BLOOD PRESSURE: 118 MMHG | HEART RATE: 60 BPM | BODY MASS INDEX: 45.06 KG/M2

## 2018-09-25 PROCEDURE — 96413 CHEMO IV INFUSION 1 HR: CPT

## 2018-09-25 RX ADMIN — SODIUM CHLORIDE 240 MG: 9 INJECTION, SOLUTION INTRAVENOUS at 12:14

## 2018-09-25 RX ADMIN — SODIUM CHLORIDE 20 ML/HR: 0.9 INJECTION, SOLUTION INTRAVENOUS at 11:56

## 2018-10-04 DIAGNOSIS — C34.92 ADENOCARCINOMA OF LEFT LUNG, STAGE 4 (HCC): ICD-10-CM

## 2018-10-04 RX ORDER — PREDNISONE 10 MG/1
10 TABLET ORAL DAILY
Qty: 90 TABLET | Refills: 1 | Status: SHIPPED | OUTPATIENT
Start: 2018-10-04 | End: 2019-04-08

## 2018-10-05 RX ORDER — SODIUM CHLORIDE 9 MG/ML
20 INJECTION, SOLUTION INTRAVENOUS CONTINUOUS
Status: DISCONTINUED | OUTPATIENT
Start: 2018-10-09 | End: 2018-10-12 | Stop reason: HOSPADM

## 2018-10-08 ENCOUNTER — TRANSCRIBE ORDERS (OUTPATIENT)
Dept: LAB | Facility: CLINIC | Age: 68
End: 2018-10-08

## 2018-10-08 ENCOUNTER — APPOINTMENT (OUTPATIENT)
Dept: LAB | Facility: CLINIC | Age: 68
End: 2018-10-08
Payer: MEDICARE

## 2018-10-08 DIAGNOSIS — C34.91 MALIGNANT NEOPLASM OF UNSPECIFIED PART OF RIGHT BRONCHUS OR LUNG (HCC): ICD-10-CM

## 2018-10-08 LAB
ALBUMIN SERPL BCP-MCNC: 2.8 G/DL (ref 3.5–5)
ALP SERPL-CCNC: 81 U/L (ref 46–116)
ALT SERPL W P-5'-P-CCNC: 24 U/L (ref 12–78)
ANION GAP SERPL CALCULATED.3IONS-SCNC: 8 MMOL/L (ref 4–13)
AST SERPL W P-5'-P-CCNC: 14 U/L (ref 5–45)
BILIRUB SERPL-MCNC: 0.2 MG/DL (ref 0.2–1)
BUN SERPL-MCNC: 22 MG/DL (ref 5–25)
CALCIUM SERPL-MCNC: 8.7 MG/DL (ref 8.3–10.1)
CHLORIDE SERPL-SCNC: 104 MMOL/L (ref 100–108)
CO2 SERPL-SCNC: 28 MMOL/L (ref 21–32)
CREAT SERPL-MCNC: 0.83 MG/DL (ref 0.6–1.3)
GFR SERPL CREATININE-BSD FRML MDRD: 72 ML/MIN/1.73SQ M
GLUCOSE P FAST SERPL-MCNC: 119 MG/DL (ref 65–99)
POTASSIUM SERPL-SCNC: 3.8 MMOL/L (ref 3.5–5.3)
PROT SERPL-MCNC: 6.7 G/DL (ref 6.4–8.2)
SODIUM SERPL-SCNC: 140 MMOL/L (ref 136–145)

## 2018-10-08 PROCEDURE — 80053 COMPREHEN METABOLIC PANEL: CPT

## 2018-10-09 ENCOUNTER — HOSPITAL ENCOUNTER (OUTPATIENT)
Dept: INFUSION CENTER | Facility: CLINIC | Age: 68
Discharge: HOME | End: 2018-10-09
Payer: MEDICARE

## 2018-10-09 VITALS
TEMPERATURE: 98 F | HEART RATE: 64 BPM | DIASTOLIC BLOOD PRESSURE: 68 MMHG | OXYGEN SATURATION: 98 % | SYSTOLIC BLOOD PRESSURE: 130 MMHG | RESPIRATION RATE: 14 BRPM

## 2018-10-09 PROCEDURE — 96413 CHEMO IV INFUSION 1 HR: CPT

## 2018-10-09 RX ADMIN — SODIUM CHLORIDE 20 ML/HR: 0.9 INJECTION, SOLUTION INTRAVENOUS at 12:36

## 2018-10-09 RX ADMIN — SODIUM CHLORIDE 240 MG: 9 INJECTION, SOLUTION INTRAVENOUS at 13:14

## 2018-10-09 NOTE — PROGRESS NOTES
Pt here for Opdivo  Vitals stable; labs drawn prior to treatment  Callbell within reach; will continue to monitor

## 2018-10-12 ENCOUNTER — OFFICE VISIT (OUTPATIENT)
Dept: HEMATOLOGY ONCOLOGY | Facility: CLINIC | Age: 68
End: 2018-10-12
Payer: MEDICARE

## 2018-10-12 VITALS
HEIGHT: 64 IN | WEIGHT: 227 LBS | OXYGEN SATURATION: 98 % | BODY MASS INDEX: 38.76 KG/M2 | DIASTOLIC BLOOD PRESSURE: 80 MMHG | RESPIRATION RATE: 18 BRPM | HEART RATE: 66 BPM | SYSTOLIC BLOOD PRESSURE: 160 MMHG | TEMPERATURE: 96.7 F

## 2018-10-12 DIAGNOSIS — C41.9 MALIGNANT NEOPLASM OF BONE WITH METASTASES (HCC): ICD-10-CM

## 2018-10-12 DIAGNOSIS — C34.92 ADENOCARCINOMA OF LEFT LUNG, STAGE 4 (HCC): Primary | ICD-10-CM

## 2018-10-12 PROCEDURE — 99214 OFFICE O/P EST MOD 30 MIN: CPT | Performed by: INTERNAL MEDICINE

## 2018-10-12 RX ORDER — HYDROCODONE BITARTRATE AND ACETAMINOPHEN 5; 325 MG/1; MG/1
TABLET ORAL
COMMUNITY
Start: 2018-09-24 | End: 2018-10-18

## 2018-10-12 NOTE — PROGRESS NOTES
Hematology Outpatient Follow - Up Note  Christina Griffith 77 y o  female MRN: @ Encounter: [id number]        Date:  10/12/2018        Assessment/ Plan:   Stage 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 of the lung with scapula involvement diagnosed in 08/2016, she was treated with Pembrolizumab initially with PDL expression more than 50%, she developed pneumonitis with Pembrolizumab requiring discontinuation of the therapy for a total of 6 months she had watch and observe and later on she had progression of disease in the left upper lobe as well as the left scapula again, she received palliative radiation therapy to the left scapula area  We change therapy to nivolumab 240 mg flat dose every 2 weeks along with prednisone 10 mg p o  Daily initiated in April 2018 she had been doing well with stable disease however most recently she had more left scapular pain persistent for the past 2 weeks, physical examination showed bulging out of the left scapula  1  I will order CT/ PET scan to assess response  2  I will order liquid biopsy to determine if the patient has any targetable mutation otherwise if no target mutation and the patient showed progression of disease I will use Alimta/carboplatin  3  Follow up after the PET scan and the liquid biopsy results           HPI:  23-year-old  female who was admitted to the hospital with arrhythmia, was found to have right lower lobe infiltrate in August 2016, treated with antibiotics however repeat chest x-ray showed persistent right lower lobe infiltrate  Subsequently the patient had a CT scan of the chest showed a right perihilar mass, subcarinal lymphadenopathy, lytic lesion of the right costovertebral junction at T10 level  PET scan on October 2016 showed a right perihilar mass measuring 3 5 cm with SUV of 8 9, subcarinal lymph nodes measuring 3 4 x 2 2 cm with SUV of 9 2  Nodule in the left upper lobe lung measured 2 x 1 1 cm    There was a lytic lesion involving the right 10th costovertebral junction with SUV of 14  4      Biopsy showed non-small cell carcinoma with features suggesting of adenocarcinoma positive for CK 7, CK 19, CA-19-9, ANEUDY-3, partially positive for P40, p63, negative for TTF-1    Current therapy nivolumab 240 mg flat dose every 2 weeks initiated in April 2018 secondary to progression of the disease in the lung and the left scapula, prednisone 10 mg p o  daily to prevent pneumonitis, will continue the schedule of nivolumab every 2 weeks because of previous history of pneumonitis on Pembrolizumab      Previous Treatment: Pembrolizumab 200 mg IV flat dose every 3 weeks initiated in December 2016, Finished in May 2017 secondary to grade 3 pneumonitis   radiation therapy to the left scapula in October 2017            Test Results:    Imaging: No results found  Labs:   Lab Results   Component Value Date    WBC 6 83 10/08/2018    HGB 11 5 10/08/2018    HCT 36 8 10/08/2018    MCV 83 10/08/2018     10/08/2018     Lab Results   Component Value Date     10/08/2018    K 3 8 10/08/2018     10/08/2018    CO2 28 10/08/2018    ANIONGAP 9 11/23/2015    BUN 22 10/08/2018    CREATININE 0 83 10/08/2018    GLUCOSE 149 (H) 11/23/2015    GLUF 119 (H) 10/08/2018    CALCIUM 8 7 10/08/2018    AST 14 10/08/2018    ALT 24 10/08/2018    ALKPHOS 81 10/08/2018    PROT 6 8 11/23/2015    BILITOT 0 65 11/23/2015    EGFR 72 10/08/2018       No results found for: IRON, TIBC, FERRITIN    No results found for: [id number]      ROS:   Review of Systems   Constitutional: Negative for activity change, appetite change, diaphoresis, fatigue, fever and unexpected weight change  HENT: Negative for facial swelling, hearing loss, rhinorrhea, sinus pain, sinus pressure, sneezing, sore throat and tinnitus  Eyes: Negative for photophobia, pain, discharge, redness, itching and visual disturbance  Respiratory: Negative for apnea and chest tightness  Cardiovascular: Negative for chest pain, palpitations and leg swelling  Gastrointestinal: Negative for abdominal distention, abdominal pain, blood in stool, constipation, diarrhea, nausea, rectal pain and vomiting  Endocrine: Negative for cold intolerance, heat intolerance, polydipsia and polyphagia  Genitourinary: Negative for difficulty urinating, dyspareunia, frequency, hematuria, pelvic pain and urgency  Musculoskeletal: Negative for arthralgias, back pain, gait problem, joint swelling and myalgias  Left scapula consistent pain for the past 2 weeks with bulging sensation   Skin: Negative for color change, pallor and rash  Allergic/Immunologic: Negative for environmental allergies and food allergies  Neurological: Negative for dizziness, tremors, seizures, syncope, speech difficulty, numbness and headaches  Hematological: Negative for adenopathy  Does not bruise/bleed easily  Psychiatric/Behavioral: Negative for agitation, confusion, dysphoric mood, hallucinations and suicidal ideas  Current Medications: Reviewed  Allergies: Reviewed  PMH/FH/SH:  Reviewed      Physical Exam:    Body surface area is 2 05 meters squared  Wt Readings from Last 3 Encounters:   10/12/18 103 kg (227 lb)   09/25/18 104 kg (229 lb 8 oz)   09/11/18 104 kg (229 lb)        Temp Readings from Last 3 Encounters:   10/12/18 (!) 96 7 °F (35 9 °C) (Tympanic)   10/09/18 98 °F (36 7 °C) (Oral)   09/25/18 98 °F (36 7 °C) (Oral)        BP Readings from Last 3 Encounters:   10/12/18 160/80   10/09/18 130/68   09/25/18 118/64         Pulse Readings from Last 3 Encounters:   10/12/18 66   10/09/18 64   09/25/18 60        Physical Exam   Constitutional: She is oriented to person, place, and time  She appears well-developed and well-nourished  No distress  HENT:   Head: Normocephalic and atraumatic  Mouth/Throat: Oropharynx is clear and moist  No oropharyngeal exudate     Eyes: Pupils are equal, round, and reactive to light  Conjunctivae and EOM are normal    Neck: Normal range of motion  Neck supple  No tracheal deviation present  No thyromegaly present  Cardiovascular: Normal rate and regular rhythm  Exam reveals no gallop and no friction rub  No murmur heard  Pulmonary/Chest: Effort normal and breath sounds normal  No respiratory distress  She has no wheezes  She has no rales  She exhibits no tenderness  Left scapula is bulging out most likely consistent with soft tissue component overlying the scapula a could not appreciate any lymphadenopathy or other masses   Abdominal: Soft  Bowel sounds are normal  She exhibits no distension and no mass  There is no tenderness  There is no rebound and no guarding  Musculoskeletal: Normal range of motion  Lymphadenopathy:     She has no cervical adenopathy  Neurological: She is alert and oriented to person, place, and time  Skin: Skin is warm and dry  No rash noted  She is not diaphoretic  No erythema  No pallor  Psychiatric: She has a normal mood and affect  Her behavior is normal  Judgment and thought content normal    Vitals reviewed  Goals and Barriers:  Current Goal: Minimize effects of disease  Barriers: None  Patient's Capacity to Self Care:  Patient is able to self care      Code Status: @[facility]@

## 2018-10-12 NOTE — LETTER
October 12, 2018     Elsie Ariza, [address] [facility] [address]  [address] [facility] [address]    Patient: Teofilo Barrera   YOB: 1950   Date of Visit: 10/12/2018       Dear Dr Taylor Valentine:    Thank you for referring Alcon Mckenzie to me for evaluation  Below are my notes for this consultation  If you have questions, please do not hesitate to call me  I look forward to following your patient along with you  Sincerely,        Romario Brannon MD        CC: MD Romario Iyer MD  10/12/2018 10:09 AM  Sign at close encounter  Hematology Outpatient Follow - Up Note  Claudia Sung Hero 77 y o  female MRN: @ Encounter: [id number]        Date:  10/12/2018        Assessment/ Plan:   Stage 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 of the lung with scapula involvement diagnosed in 08/2016, she was treated with Pembrolizumab initially with PDL expression more than 50%, she developed pneumonitis with Pembrolizumab requiring discontinuation of the therapy for a total of 6 months she had watch and observe and later on she had progression of disease in the left upper lobe as well as the left scapula again, she received palliative radiation therapy to the left scapula area  We change therapy to nivolumab 240 mg flat dose every 2 weeks along with prednisone 10 mg p o  Daily initiated in April 2018 she had been doing well with stable disease however most recently she had more left scapular pain persistent for the past 2 weeks, physical examination showed bulging out of the left scapula  1  I will order CT/ PET scan to assess response  2  I will order liquid biopsy to determine if the patient has any targetable mutation otherwise if no target mutation and the patient showed progression of disease I will use Alimta/carboplatin  3   Follow up after the PET scan and the liquid biopsy results           HPI:  59-year-old  female who was admitted to the hospital with arrhythmia, was found to have right lower lobe infiltrate in August 2016, treated with antibiotics however repeat chest x-ray showed persistent right lower lobe infiltrate  Subsequently the patient had a CT scan of the chest showed a right perihilar mass, subcarinal lymphadenopathy, lytic lesion of the right costovertebral junction at T10 level  PET scan on October 2016 showed a right perihilar mass measuring 3 5 cm with SUV of 8 9, subcarinal lymph nodes measuring 3 4 x 2 2 cm with SUV of 9 2  Nodule in the left upper lobe lung measured 2 x 1 1 cm  There was a lytic lesion involving the right 10th costovertebral junction with SUV of 14  4      Biopsy showed non-small cell carcinoma with features suggesting of adenocarcinoma positive for CK 7, CK 19, CA-19-9, ANEUDY-3, partially positive for P40, p63, negative for TTF-1    Current therapy nivolumab 240 mg flat dose every 2 weeks initiated in April 2018 secondary to progression of the disease in the lung and the left scapula, prednisone 10 mg p o  daily to prevent pneumonitis, will continue the schedule of nivolumab every 2 weeks because of previous history of pneumonitis on Pembrolizumab      Previous Treatment: Pembrolizumab 200 mg IV flat dose every 3 weeks initiated in December 2016, Finished in May 2017 secondary to grade 3 pneumonitis   radiation therapy to the left scapula in October 2017            Test Results:    Imaging: No results found      Labs:   Lab Results   Component Value Date    WBC 6 83 10/08/2018    HGB 11 5 10/08/2018    HCT 36 8 10/08/2018    MCV 83 10/08/2018     10/08/2018     Lab Results   Component Value Date     10/08/2018    K 3 8 10/08/2018     10/08/2018    CO2 28 10/08/2018    ANIONGAP 9 11/23/2015    BUN 22 10/08/2018    CREATININE 0 83 10/08/2018    GLUCOSE 149 (H) 11/23/2015    GLUF 119 (H) 10/08/2018    CALCIUM 8 7 10/08/2018    AST 14 10/08/2018    ALT 24 10/08/2018    ALKPHOS 81 10/08/2018    PROT 6 8 11/23/2015    BILITOT 0 65 11/23/2015    EGFR 72 10/08/2018       No results found for: IRON, TIBC, FERRITIN    No results found for: [id number]      ROS:   Review of Systems   Constitutional: Negative for activity change, appetite change, diaphoresis, fatigue, fever and unexpected weight change  HENT: Negative for facial swelling, hearing loss, rhinorrhea, sinus pain, sinus pressure, sneezing, sore throat and tinnitus  Eyes: Negative for photophobia, pain, discharge, redness, itching and visual disturbance  Respiratory: Negative for apnea and chest tightness  Cardiovascular: Negative for chest pain, palpitations and leg swelling  Gastrointestinal: Negative for abdominal distention, abdominal pain, blood in stool, constipation, diarrhea, nausea, rectal pain and vomiting  Endocrine: Negative for cold intolerance, heat intolerance, polydipsia and polyphagia  Genitourinary: Negative for difficulty urinating, dyspareunia, frequency, hematuria, pelvic pain and urgency  Musculoskeletal: Negative for arthralgias, back pain, gait problem, joint swelling and myalgias  Left scapula consistent pain for the past 2 weeks with bulging sensation   Skin: Negative for color change, pallor and rash  Allergic/Immunologic: Negative for environmental allergies and food allergies  Neurological: Negative for dizziness, tremors, seizures, syncope, speech difficulty, numbness and headaches  Hematological: Negative for adenopathy  Does not bruise/bleed easily  Psychiatric/Behavioral: Negative for agitation, confusion, dysphoric mood, hallucinations and suicidal ideas  Current Medications: Reviewed  Allergies: Reviewed  PMH/FH/SH:  Reviewed      Physical Exam:    Body surface area is 2 05 meters squared      Wt Readings from Last 3 Encounters:   10/12/18 103 kg (227 lb)   09/25/18 104 kg (229 lb 8 oz)   09/11/18 104 kg (229 lb)        Temp Readings from Last 3 Encounters:   10/12/18 (!) 96 7 °F (35 9 °C) (Tympanic)   10/09/18 98 °F (36 7 °C) (Oral)   09/25/18 98 °F (36 7 °C) (Oral)        BP Readings from Last 3 Encounters:   10/12/18 160/80   10/09/18 130/68   09/25/18 118/64         Pulse Readings from Last 3 Encounters:   10/12/18 66   10/09/18 64   09/25/18 60        Physical Exam   Constitutional: She is oriented to person, place, and time  She appears well-developed and well-nourished  No distress  HENT:   Head: Normocephalic and atraumatic  Mouth/Throat: Oropharynx is clear and moist  No oropharyngeal exudate  Eyes: Pupils are equal, round, and reactive to light  Conjunctivae and EOM are normal    Neck: Normal range of motion  Neck supple  No tracheal deviation present  No thyromegaly present  Cardiovascular: Normal rate and regular rhythm  Exam reveals no gallop and no friction rub  No murmur heard  Pulmonary/Chest: Effort normal and breath sounds normal  No respiratory distress  She has no wheezes  She has no rales  She exhibits no tenderness  Left scapula is bulging out most likely consistent with soft tissue component overlying the scapula a could not appreciate any lymphadenopathy or other masses   Abdominal: Soft  Bowel sounds are normal  She exhibits no distension and no mass  There is no tenderness  There is no rebound and no guarding  Musculoskeletal: Normal range of motion  Lymphadenopathy:     She has no cervical adenopathy  Neurological: She is alert and oriented to person, place, and time  Skin: Skin is warm and dry  No rash noted  She is not diaphoretic  No erythema  No pallor  Psychiatric: She has a normal mood and affect  Her behavior is normal  Judgment and thought content normal    Vitals reviewed  Goals and Barriers:  Current Goal: Minimize effects of disease  Barriers: None  Patient's Capacity to Self Care:  Patient is able to self care      Code Status: [unfilled]

## 2018-10-15 ENCOUNTER — TELEPHONE (OUTPATIENT)
Dept: HEMATOLOGY ONCOLOGY | Facility: CLINIC | Age: 68
End: 2018-10-15

## 2018-10-15 NOTE — TELEPHONE ENCOUNTER
Cristobal Fernandes was called back to confirm what the request was for, I was never informed that to put on the requisition form write OPS to indicate that it is for mobile lab draw for a liquid biopsy  I spoke to [facility] who states nothing needs to be sent, that my calling was enough to verify the order

## 2018-10-15 NOTE — TELEPHONE ENCOUNTER
Farhat Batista from [facility] called  Received faxed order for labs but not the blood  Informed this office does not drawn blood  Neither does [facility] and do not take residents to lab  Please call to discuss mobile lab draw  Farhat Batista states needs to explain   Please call  option 1

## 2018-10-16 ENCOUNTER — OFFICE VISIT (OUTPATIENT)
Dept: CARDIOLOGY CLINIC | Facility: CLINIC | Age: 68
End: 2018-10-16
Payer: MEDICARE

## 2018-10-16 VITALS
DIASTOLIC BLOOD PRESSURE: 68 MMHG | OXYGEN SATURATION: 98 % | BODY MASS INDEX: 39.21 KG/M2 | SYSTOLIC BLOOD PRESSURE: 122 MMHG | HEIGHT: 64 IN | WEIGHT: 229.7 LBS | HEART RATE: 68 BPM

## 2018-10-16 DIAGNOSIS — I48.91 ATRIAL FIBRILLATION, UNSPECIFIED TYPE (HCC): ICD-10-CM

## 2018-10-16 DIAGNOSIS — I48.91 ATRIAL FIBRILLATION WITH RAPID VENTRICULAR RESPONSE (HCC): Primary | ICD-10-CM

## 2018-10-16 DIAGNOSIS — I10 BENIGN ESSENTIAL HYPERTENSION: ICD-10-CM

## 2018-10-16 PROCEDURE — 99214 OFFICE O/P EST MOD 30 MIN: CPT | Performed by: INTERNAL MEDICINE

## 2018-10-16 PROCEDURE — 93000 ELECTROCARDIOGRAM COMPLETE: CPT | Performed by: INTERNAL MEDICINE

## 2018-10-16 NOTE — PROGRESS NOTES
Cardiology   Fabiano Ryan Hero 77 y o  female MRN: [id number]        Impression:  1  Paroxysmal atrial fibrillation - in normal sinus rhythm  On antiarrhythmic medications  On anticoagulation  2  Hypertension - good control      Recommendations:  1  Continue current medications  2  Follow up in 6 months  HPI: Nayla De La Cruz is a 77y o  year old female with hypertension, diabetes, stage IV lung ca, paroxysmal atrial fibrillation, who returns for follow up  Unable to tolerate losartan due to cough  No chest pain, shortness of breath, or palpitations  Review of Systems   Constitutional: Negative  HENT: Negative  Eyes: Negative  Respiratory: Negative for chest tightness and shortness of breath  Cardiovascular: Negative for chest pain, palpitations and leg swelling  Gastrointestinal: Negative  Endocrine: Negative  Genitourinary: Negative  Musculoskeletal: Negative  Skin: Negative  Allergic/Immunologic: Negative  Neurological: Negative  Hematological: Negative  Psychiatric/Behavioral: Negative  All other systems reviewed and are negative          Past Medical History:   Diagnosis Date    Arthritis     [facility] [address] [facility]     Diabetes mellitus ([facility] [address] )     Diabetes mellitus type 2, uncomplicated (HCC)     Last assessed: 8/17/17    Essential hypertension     Frozen shoulder     L shoulder    GERD (gastroesophageal reflux disease)     Hx of cancer of uterus     Last assessed: 8/21/15    Hyperlipidemia     Lung mass     diagnosed 9/2016    Malignant neoplasm without specification of site ([facility] [address] )     Skin cancer, basal cell     right eye area    Stage 4 lung cancer ([facility] [address] )      Past Surgical History:   Procedure Laterality Date    APPENDECTOMY      BRONCHOSCOPY N/A 11/17/2016    Procedure: BRONCHOSCOPY FLEXIBLE;  Surgeon: Sneha Medina MD;  [facility]: [facility];  Service:    [address] [place] [address] HYSTERECTOMY  2000    Total abdominal    LARYNGOSCOPY      Flexible Fiberoptic, (Therapeutic), Resolved: 11/17/16    MOHS SURGERY      Micrographic Surgery Face    AR BRONCHOSCOPY NEEDLE BX TRACHEA MAIN STEM&/BRON N/A 11/17/2016    Procedure: EBUS; FROZEN SECTION ;  Surgeon: Deshaun Saleem MD;  [facility];  Service: Thoracic    AR [address] N/A 5/24/2017    Procedure: Jacky Rocha;  Surgeon: Danay Corrales MD;  Location: [facility]; Service: Pulmonary    TONSILECTOMY AND ADNOIDECTOMY      TOTAL KNEE ARTHROPLASTY Right 09/23/2014     History   Alcohol Use No     History   Drug Use No     History   Smoking Status    Former Smoker    Packs/day: 1 00    Years: 50 00    Types: Cigarettes    Start date: 1962    Quit date: 2012   Smokeless Tobacco    Never Used     Comment: Quit in 2000     Family History   Problem Relation Age of Onset    Heart disease Father         cardiac disorder    Hypertension Father     Arthritis Father     Stroke Father         cerebrovascular accident    Diabetes Mother     Heart disease Mother    Mavis Cousin Stroke Mother         cerebrovascular accident   Mavis Cousin Dementia Mother     Hypertension Mother     Thyroid disease Mother     Cancer Maternal Grandfather         of unknown origin    Cancer Family     Depression Family     Diabetes Family     Hyperlipidemia Family         essential    Heart disease Family     Hypertension Family     Stroke Family         syndrome    Lung cancer Paternal Uncle        Allergies:   Allergies   Allergen Reactions    Amoxicillin Rash    Cardizem [Diltiazem] Rash     Rash      Statins Myalgia     Severe muscle aching       Medications:     Current Outpatient Prescriptions:     apixaban (ELIQUIS) 5 mg, Take one tablet twice daily, Disp: 180 tablet, Rfl: 3    Calcium Carb-Cholecalciferol 600-800 MG-UNIT TABS, Take 1 tablet by mouth 2 (two) times a day, Disp: , Rfl:     Cholecalciferol (VITAMIN D) 2000 units CAPS, Take by mouth, Disp: , Rfl:   cyanocobalamin (VITAMIN B-12) 100 mcg tablet, Take by mouth daily, Disp: , Rfl:     HYDROcodone-acetaminophen (NORCO) 5-325 mg per tablet, , Disp: , Rfl:     Linagliptin (TRADJENTA) 5 MG TABS, Take 5 mg by mouth daily, Disp: , Rfl:     metFORMIN (GLUCOPHAGE) 1000 MG tablet, Take 1,000 mg by mouth 2 (two) times a day with meals  , Disp: , Rfl:     metoprolol tartrate (LOPRESSOR) 25 mg tablet, Take 1 tablet (25 mg total) by mouth every 12 (twelve) hours, Disp: 180 tablet, Rfl: 3    omeprazole (PriLOSEC) 20 mg delayed release capsule, Take 1 capsule by mouth daily, Disp: , Rfl:     predniSONE 10 mg tablet, TAKE 1 TABLET (10 MG TOTAL) BY MOUTH DAILY, Disp: 90 tablet, Rfl: 1    sotalol (BETAPACE) 80 mg tablet, Take 1 tablet (80 mg total) by mouth every 12 (twelve) hours, Disp: 180 tablet, Rfl: 3    venlafaxine (EFFEXOR) 75 mg tablet, Take 0 5 tablets (37 5 mg total) by mouth daily, Disp: 45 tablet, Rfl: 1    Albuterol Sulfate (VENTOLIN HFA IN), Inhale, Disp: , Rfl:       Wt Readings from Last 3 Encounters:   10/16/18 104 kg (229 lb 11 2 oz)   10/12/18 103 kg (227 lb)   09/25/18 104 kg (229 lb 8 oz)     Temp Readings from Last 3 Encounters:   10/12/18 (!) 96 7 °F (35 9 °C) (Tympanic)   10/09/18 98 °F (36 7 °C) (Oral)   09/25/18 98 °F (36 7 °C) (Oral)     BP Readings from Last 3 Encounters:   10/16/18 122/68   10/12/18 160/80   10/09/18 130/68     Pulse Readings from Last 3 Encounters:   10/16/18 68   10/12/18 66   10/09/18 64         Physical Exam   Constitutional: She is oriented to person, place, and time  She appears well-developed  HENT:   Head: Atraumatic  Eyes: EOM are normal    Neck: Normal range of motion  Cardiovascular: Normal rate, regular rhythm and normal heart sounds  Exam reveals no gallop and no friction rub  No murmur heard  Pulmonary/Chest: Effort normal and breath sounds normal  No respiratory distress  She has no wheezes  She has no rales  Abdominal: Soft     Musculoskeletal: Normal range of motion  Neurological: She is alert and oriented to person, place, and time  Skin: Skin is warm and dry  Psychiatric: She has a normal mood and affect  Laboratory Studies:  CMP:  Lab Results   Component Value Date     10/08/2018    K 3 8 10/08/2018     10/08/2018    CO2 28 10/08/2018    ANIONGAP 9 2015    BUN 22 10/08/2018    CREATININE 0 83 10/08/2018    GLUCOSE 149 (H) 2015    AST 14 10/08/2018    ALT 24 10/08/2018    BILITOT 0 65 2015    EGFR 72 10/08/2018       Lipid Profile:   Lab Results   Component Value Date    CHOL 229 2015     Lab Results   Component Value Date    HDL 43 09/10/2018     Lab Results   Component Value Date    LDLCALC 119 (H) 09/10/2018     Lab Results   Component Value Date    TRIG 227 (H) 09/10/2018       Cardiac testing:   EKG reviewed personally: NSR 70 PAC  Results for orders placed during the hospital encounter of 16   Echo complete with contrast if indicated    Narrative [address]  [phone]    Transthoracic Echocardiogram  2D, M-mode, Doppler, and Color Doppler    Study date:  28-Aug-2016    Patient: Yasmeen Villanueva  MR number: [id number]  Account number: [de-identified]  : 1950  Age: 74 years  Gender: Female  Status: Inpatient  Location: Bedside  Height: 65 in  Weight: 215 6 lb  BP: 133/ 66 mmHg    Indications: AFIB    Diagnoses: I48 0 - Atrial fibrillation    Sonographer:  AKI Valenzuela  Primary Physician:  Kimi Lucio DO  Referring Physician:  Kelsea Tobias MD  Group:  [facility]  Interpreting Physician:  Barbara Puga MD    SUMMARY    LEFT VENTRICLE:  Systolic function was normal by visual assessment  Ejection fraction was  estimated to be 60 %  There were no regional wall motion abnormalities  Wall thickness was mildly increased    Features were consistent with a pseudonormal left ventricular filling pattern,  with concomitant abnormal relaxation and increased filling pressure (grade 2  diastolic dysfunction)  RIGHT VENTRICLE:  Systolic pressure was mildly increased  Estimated peak pressure was 35 mmHg  TRICUSPID VALVE:  There was mild regurgitation  HISTORY: PRIOR HISTORY: Hyperlipidemia, GERD, Hypertension, Frozen Shoulder,  Diabetes    PROCEDURE: The procedure was performed at the bedside  This was a routine  study  The transthoracic approach was used  The study included complete 2D  imaging, M-mode, complete spectral Doppler, and color Doppler  The heart rate  was 80 bpm, at the start of the study  Images were obtained from the  parasternal, apical, subcostal, and suprasternal notch acoustic windows  Echocardiographic views were limited due to poor acoustic window availability  and lung interference  This was a technically difficult study  LEFT VENTRICLE: Size was normal  Systolic function was normal by visual  assessment  Ejection fraction was estimated to be 60 %  There were no regional  wall motion abnormalities  Wall thickness was mildly increased  DOPPLER: The  ratio of early ventricular filling to atrial contraction velocities was within  the normal range  Features were consistent with a pseudonormal left ventricular  filling pattern, with concomitant abnormal relaxation and increased filling  pressure (grade 2 diastolic dysfunction)  RIGHT VENTRICLE: The size was normal  Systolic function was normal  DOPPLER:  Systolic pressure was mildly increased  Estimated peak pressure was 35 mmHg  LEFT ATRIUM: Size was normal     RIGHT ATRIUM: Size was normal     MITRAL VALVE: Valve structure was normal  There was normal leaflet separation  DOPPLER: The transmitral velocity was within the normal range  There was no  evidence for stenosis  There was no regurgitation  AORTIC VALVE: There was no left ventricular outflow obstruction  The valve was  trileaflet   Leaflets exhibited normal thickness and normal cuspal separation  DOPPLER: Transaortic velocity was within the normal range  There was no  evidence for stenosis  There was no regurgitation  TRICUSPID VALVE: The valve structure was normal  There was normal leaflet  separation  DOPPLER: The transtricuspid velocity was within the normal range  There was no evidence for stenosis  There was mild regurgitation  PULMONIC VALVE: Leaflets exhibited normal thickness, no calcification, and  normal cuspal separation  DOPPLER: The transpulmonic velocity was within the  normal range  There was no regurgitation  PERICARDIUM: There was no pericardial effusion  AORTA: The root exhibited normal size  SYSTEMIC VEINS: IVC: The inferior vena cava was normal in size and course    Respirophasic changes were normal     SYSTEM MEASUREMENT TABLES    2D  %FS: 32 76 %  AV Diam: 2 65 cm  EDV(Teich): 66 2 ml  EF(Teich): 61 86 %  ESV(Teich): 25 25 ml  IVSd: 1 17 cm  LA Area: 14 32 cm2  LA Diam: 3 26 cm  LVEDV MOD A4C: 79 24 ml  LVEF MOD A4C: 67 92 %  LVESV MOD A4C: 25 42 ml  LVIDd: 3 91 cm  LVIDs: 2 63 cm  LVLd A4C: 7 4 cm  LVLs A4C: 6 11 cm  LVPWd: 1 08 cm  RA Area: 12 29 cm2  RVIDd: 2 97 cm  SV MOD A4C: 53 82 ml  SV(Teich): 40 95 ml    CW  TR MaxP 89 mmHg  TR Vmax: 2 82 m/s    MM  TAPSE: 2 03 cm    PW  MV A Eric: 0 75 m/s  MV Dec [place]: 3 85 m/s2  MV DecT: 204 26 ms  MV E Eric: 0 79 m/s  MV E/A Ratio: 1 05  MV PHT: 59 23 ms  MVA By PHT: 3 71 cm2    [facility] Accredited Echocardiography Laboratory    Prepared and electronically signed by    Rc Auguste MD  Signed [id number] 12:09:14

## 2018-10-18 ENCOUNTER — TELEPHONE (OUTPATIENT)
Dept: PALLIATIVE MEDICINE | Facility: CLINIC | Age: 68
End: 2018-10-18

## 2018-10-18 DIAGNOSIS — C34.92 ADENOCARCINOMA OF LEFT LUNG, STAGE 4 (HCC): ICD-10-CM

## 2018-10-18 DIAGNOSIS — G89.3 CANCER RELATED PAIN: Primary | Chronic | ICD-10-CM

## 2018-10-18 RX ORDER — OXYCODONE HYDROCHLORIDE 5 MG/1
5 TABLET ORAL EVERY 4 HOURS PRN
Qty: 60 TABLET | Refills: 0 | Status: SHIPPED | OUTPATIENT
Start: 2018-10-18 | End: 2018-11-05 | Stop reason: SDUPTHER

## 2018-10-18 NOTE — TELEPHONE ENCOUNTER
Spoke with her over phone  Please add patient to schedule Monday Oct 29 @ 9a (no need to call, I confirmed with her)  Worsened pain of left scapular area, has been taking old prescription of hydrocodone-acetaminophen 2-3x/day for past 2 weeks with some improvement of pain  Pain is constant  Following up with Dr Cris Arceo and will be having PET scan next week     Plan  - start oxycodone- script written as 5mg q 4h PRN (will send to pharmacy) but advised to take TID on regular schedule as pain is constant   -Stop hydrocodone-acetaminophen   -Start senna  -Follow up in 2 weeks

## 2018-10-22 ENCOUNTER — HOSPITAL ENCOUNTER (OUTPATIENT)
Dept: RADIOLOGY | Age: 68
Discharge: HOME | End: 2018-10-22
Payer: MEDICARE

## 2018-10-22 DIAGNOSIS — C34.92 ADENOCARCINOMA OF LEFT LUNG, STAGE 4 (HCC): ICD-10-CM

## 2018-10-22 DIAGNOSIS — C41.9 MALIGNANT NEOPLASM OF BONE WITH METASTASES (HCC): ICD-10-CM

## 2018-10-22 LAB — GLUCOSE SERPL-MCNC: 165 MG/DL (ref 65–140)

## 2018-10-22 PROCEDURE — A9552 F18 FDG: HCPCS

## 2018-10-22 PROCEDURE — 78815 PET IMAGE W/CT SKULL-THIGH: CPT

## 2018-10-22 PROCEDURE — 82948 REAGENT STRIP/BLOOD GLUCOSE: CPT

## 2018-10-26 ENCOUNTER — OFFICE VISIT (OUTPATIENT)
Dept: HEMATOLOGY ONCOLOGY | Facility: CLINIC | Age: 68
End: 2018-10-26
Payer: MEDICARE

## 2018-10-26 VITALS
DIASTOLIC BLOOD PRESSURE: 71 MMHG | OXYGEN SATURATION: 98 % | SYSTOLIC BLOOD PRESSURE: 131 MMHG | TEMPERATURE: 97.3 F | RESPIRATION RATE: 16 BRPM | BODY MASS INDEX: 38.58 KG/M2 | HEIGHT: 64 IN | WEIGHT: 226 LBS | HEART RATE: 66 BPM

## 2018-10-26 DIAGNOSIS — C34.90 MALIGNANT NEOPLASM OF LUNG, UNSPECIFIED LATERALITY, UNSPECIFIED PART OF LUNG (HCC): Primary | ICD-10-CM

## 2018-10-26 DIAGNOSIS — C34.92 ADENOCARCINOMA OF LEFT LUNG, STAGE 4 (HCC): Primary | ICD-10-CM

## 2018-10-26 PROCEDURE — 99215 OFFICE O/P EST HI 40 MIN: CPT | Performed by: INTERNAL MEDICINE

## 2018-10-26 RX ORDER — DEXAMETHASONE 4 MG/1
TABLET ORAL
Qty: 30 TABLET | Refills: 1 | Status: SHIPPED | OUTPATIENT
Start: 2018-10-26 | End: 2019-05-02 | Stop reason: SDUPTHER

## 2018-10-26 RX ORDER — DEXAMETHASONE 4 MG/1
TABLET ORAL
Qty: 30 TABLET | Refills: 1 | Status: SHIPPED | OUTPATIENT
Start: 2018-10-26 | End: 2018-11-27 | Stop reason: SDUPTHER

## 2018-10-26 NOTE — PROGRESS NOTES
Hematology Outpatient Follow - Up Note  Keaton Griffith 77 y o  female MRN: @ Encounter: [id number]        Date:  10/26/2018        Assessment/ Plan:      metastatic adenocarcinoma of the lung primary in the right lower lobe, CT scan showed right perihilar mass, subcarinal lymphadenopathy, lytic lesion of the right costovertebral junction of T10, PET scan in October 2016 confirmed the finding and there is a nodule in the left upper lobe of the lung measuring 2 x 1 1 cm, biopsy of the bone showed non-small cell lung cancer, negative for EGFR mutation, Ross 1 mutation, ALK gene rearrangement however positive for PD L1 expression of 60%, she received radiation therapy to the T10 area and then treated with Pembrolizumab 200 mg IV flat dose every 3 weeks initiated in December 2016 and finished in May 2017 secondary to grade 3 pneumonitis, also she had progression of disease of the left scapula in October 2017 she received radiation therapy  We changed therapy to nivolumab with prednisone 10 mg p o  Daily with progression of disease in April 2018 she did very well until October 2018 with pain in the left scapula, PET scan showed enlarging area, no new lesions in the chest abdomen and pelvis  1  I will call Radiation Oncology to assess if they can deliver extra radiation therapy to the left scapula  2  I will change therapy to Alimta 500 milligram/meter squared, carboplatin AUC 5 every 3 weeks, side effects of chemotherapy that are but not limited to anaphylactic reaction, nausea, vomiting, renal failure, pancytopenia with told patient agree to proceed  Vitamin B12 1000 mcg IM shot every 3 weeks, multivitamin 1 tab p o  Daily  Follow-up every 3 weeks with CBC, CMP  Patient to continue prednisone 10 mg p o   Daily, however she will stop prednisone the day before the day of the day after chemotherapy and she will take dexamethasone for Alimta           HPI:  78-year-old  female who was admitted to the hospital with arrhythmia, was found to have right lower lobe infiltrate in August 2016, treated with antibiotics however repeat chest x-ray showed persistent right lower lobe infiltrate  Subsequently the patient had a CT scan of the chest showed a right perihilar mass, subcarinal lymphadenopathy, lytic lesion of the right costovertebral junction at T10 level   PET scan on October 2016 showed a right perihilar mass measuring 3 5 cm with SUV of 8 9, subcarinal lymph nodes measuring 3 4 x 2 2 cm with SUV of 9 2  Nodule in the left upper lobe lung measured 2 x 1 1 cm   There was a lytic lesion involving the right 10th costovertebral junction with SUV of 14  4      Biopsy showed non-small cell carcinoma with features suggesting of adenocarcinoma positive for CK 7, CK 19, CA-19-9, ANEUDY-3, partially positive for P40, p63, negative for TTF-1     molecular test were negative for EGFR mutation, Ross 1 mutation ALK gene rearrangement, PDL-1 expression was 60%     current therapy nivolumab 240 mg flat dose every 2 weeks initiated April 2018 secondary to progression of disease of the lung and left scapula, prednisone 10 mg p o  Daily to prevent pneumonitis  Progression of disease in the left scapula by the PET scan in October 2018, no new lesions were seen      Previous Treatment:Pembrolizumab 200 mg IV flat dose every 3 weeks initiated in December 2016, Finished in May 2017 secondary to grade 3 pneumonitis   radiation therapy to the left scapula in October 2017              Test Results:    Imaging: Nm Pet Ct Skull Base To Mid Thigh    Result Date: 10/22/2018  Narrative: PET/CT SCAN INDICATION: History of metastatic lung cancer  Restaging exam   Persistent left scapular pain  Additional history of endometrial cancer in 2000  C34 92:  Malignant neoplasm of unspecified part of left bronchus or lung C41 9: 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, unspecified MODIFIER: PS COMPARISON: PET/CT from 3/29/2018, chest CT from 8/23/2018 CELL TYPE:  non-small cell carcinoma w/ features s/o adenocarcinoma (T10 bone bx 10/18/16) TECHNIQUE:   8 5 mCi F-18-FDG administered IV  Multiplanar attenuation corrected and non attenuation corrected PET images are available for interpretation, and contiguous, low dose, axial CT sections were obtained from the skull base through the femurs   Intravenous contrast material was not utilized  This examination, like all CT scans performed in the [facility], was performed utilizing techniques to minimize radiation dose exposure, including the use of iterative reconstruction and automated exposure control  Fasting serum glucose: 165 mg/dl FINDINGS: VISUALIZED BRAIN:   No acute abnormalities are seen  HEAD/NECK:   There is a physiologic distribution of FDG  No FDG avid cervical adenopathy is seen  CT images: Scattered mucosal thickening is noted of the ethmoid air cells  CHEST:   Mild FDG uptake in the left upper lobe lesion, SUV max of 2 4, previously 4 2  This lesion measures 2 9 x 1 3 cm in size, image 83 series 3, stable  Scattered stable subcentimeter pulmonary nodules again noted, not FDG avid but likely too small to characterize by PET  Mild patchy opacity in the right upper lobe inferiorly, stable  CT images: Scattered coronary artery calcifications noted  Small hiatal hernia noted  ABDOMEN:   No FDG avid soft tissue lesions are seen  CT images: Prior cholecystectomy  Focal scarring in the right kidney midpole with associated cortical calcification  Scattered colonic diverticulosis  1 5 cm cystic lesion at the pancreatic body, unchanged  No focal FDG uptake here  PELVIS: No FDG avid soft tissue lesions are seen  CT images: Multiple surgical clips in the retroperitoneum and along the iliac chains  Small 11 mm ovoid hypodense focus at the left uterine bed may be calcified  OSSEOUS STRUCTURES: Enlarging FDG avid left scapular lesion, SUV max of 14 7, previously 8 3    This measures approximately 4 0 x 3 1 cm in size on CT image 77 series 3  There is erosion of the underlying scapular bone  This is not definitely seen on the prior CT  No focal FDG uptake at the right T10 costovertebral junction  CT images: Otherwise stable appearance  Impression: 1  Mild FDG uptake in the left upper lobe lesion, decreased from the prior examination  2  Further increased FDG uptake in the enlarging left scapular lesion compatible with progression  3  Stable small pancreatic cystic lesion  This is not FDG avid but this does not exclude the possibility of a cystic neoplasm  Recommend continued follow-up  Workstation performed: [id number]       Labs:   Lab Results   Component Value Date    WBC 6 83 10/08/2018    HGB 11 5 10/08/2018    HCT 36 8 10/08/2018    MCV 83 10/08/2018     10/08/2018     Lab Results   Component Value Date     10/08/2018    K 3 8 10/08/2018     10/08/2018    CO2 28 10/08/2018    ANIONGAP 9 11/23/2015    BUN 22 10/08/2018    CREATININE 0 83 10/08/2018    GLUCOSE 149 (H) 11/23/2015    GLUF 119 (H) 10/08/2018    CALCIUM 8 7 10/08/2018    AST 14 10/08/2018    ALT 24 10/08/2018    ALKPHOS 81 10/08/2018    PROT 6 8 11/23/2015    BILITOT 0 65 11/23/2015    EGFR 72 10/08/2018       No results found for: IRON, TIBC, FERRITIN    No results found for: [id number]      ROS:   Review of Systems   Constitutional: Negative for activity change, appetite change, diaphoresis, fatigue, fever and unexpected weight change  HENT: Negative for facial swelling, hearing loss, rhinorrhea, sinus pain, sinus pressure, sneezing, sore throat and tinnitus  Eyes: Negative for photophobia, pain, discharge, redness, itching and visual disturbance  Respiratory: Negative for apnea and chest tightness  Cardiovascular: Negative for chest pain, palpitations and leg swelling     Gastrointestinal: Negative for abdominal distention, abdominal pain, blood in stool, constipation, diarrhea, nausea, rectal pain and vomiting  Endocrine: Negative for cold intolerance, heat intolerance, polydipsia and polyphagia  Genitourinary: Negative for difficulty urinating, dyspareunia, frequency, hematuria, pelvic pain and urgency  Musculoskeletal: Negative for arthralgias, back pain, gait problem, joint swelling and myalgias  Left scapula pain   Skin: Negative for color change, pallor and rash  Allergic/Immunologic: Negative for environmental allergies and food allergies  Neurological: Negative for dizziness, tremors, seizures, syncope, speech difficulty, numbness and headaches  Hematological: Negative for adenopathy  Does not bruise/bleed easily  Psychiatric/Behavioral: Negative for agitation, confusion, dysphoric mood, hallucinations and suicidal ideas  Current Medications: Reviewed  Allergies: Reviewed  PMH/FH/SH:  Reviewed      Physical Exam:    Body surface area is 2 05 meters squared  Wt Readings from Last 3 Encounters:   10/26/18 103 kg (226 lb)   10/16/18 104 kg (229 lb 11 2 oz)   10/12/18 103 kg (227 lb)        Temp Readings from Last 3 Encounters:   10/26/18 (!) 97 3 °F (36 3 °C) (Tympanic)   10/12/18 (!) 96 7 °F (35 9 °C) (Tympanic)   10/09/18 98 °F (36 7 °C) (Oral)        BP Readings from Last 3 Encounters:   10/26/18 131/71   10/16/18 122/68   10/12/18 160/80         Pulse Readings from Last 3 Encounters:   10/26/18 66   10/16/18 68   10/12/18 66        Physical Exam   Constitutional: She is oriented to person, place, and time  She appears well-developed and well-nourished  No distress  HENT:   Head: Normocephalic and atraumatic  Mouth/Throat: Oropharynx is clear and moist  No oropharyngeal exudate  Eyes: Pupils are equal, round, and reactive to light  Conjunctivae and EOM are normal    Neck: Normal range of motion  Neck supple  No tracheal deviation present  No thyromegaly present  Cardiovascular: Normal rate and regular rhythm  Exam reveals no gallop and no friction rub      No murmur heard   Pulmonary/Chest: Effort normal and breath sounds normal  No respiratory distress  She has no wheezes  She has no rales  She exhibits no tenderness  Abdominal: Soft  Bowel sounds are normal  She exhibits no distension and no mass  There is no tenderness  There is no rebound and no guarding  Musculoskeletal: Normal range of motion  Bulging area of the left scapula measuring 8 x 8 cm   Lymphadenopathy:     She has no cervical adenopathy  Neurological: She is alert and oriented to person, place, and time  Skin: Skin is warm and dry  No rash noted  She is not diaphoretic  No erythema  No pallor  Psychiatric: She has a normal mood and affect  Her behavior is normal  Judgment and thought content normal    Vitals reviewed  Goals and Barriers:  Current Goal: Minimize effects of disease  Barriers: None  Patient's Capacity to Self Care:  Patient is able to self care      Code Status: @[facility] [place]@

## 2018-10-26 NOTE — LETTER
October 26, 2018     Kimi Lucio, [address] [facility]  [address] [address],[address]  [address] [address]    Patient: Kusum Romero   YOB: 1950   Date of Visit: 10/26/2018       Dear Dr Marlon Frias:    Thank you for referring Nickolas Pickering to me for evaluation  Below are my notes for this consultation  If you have questions, please do not hesitate to call me  I look forward to following your patient along with you  Sincerely,        Oliva Whittington MD        CC: MD Oliva Srivastava MD  10/26/2018 11:14 AM  Sign at close encounter  Hematology Outpatient Follow - Up Note  Keaton Griffith 77 y o  female MRN: @ Encounter: [id number]        Date:  10/26/2018        Assessment/ Plan:      metastatic adenocarcinoma of the lung primary in the right lower lobe, CT scan showed right perihilar mass, subcarinal lymphadenopathy, lytic lesion of the right costovertebral junction of T10, PET scan in October 2016 confirmed the finding and there is a nodule in the left upper lobe of the lung measuring 2 x 1 1 cm, biopsy of the bone showed non-small cell lung cancer, negative for EGFR mutation, Ross 1 mutation, ALK gene rearrangement however positive for PD L1 expression of 60%, she received radiation therapy to the T10 area and then treated with Pembrolizumab 200 mg IV flat dose every 3 weeks initiated in December 2016 and finished in May 2017 secondary to grade 3 pneumonitis, also she had progression of disease of the left scapula in October 2017 she received radiation therapy  We changed therapy to nivolumab with prednisone 10 mg p o  Daily with progression of disease in April 2018 she did very well until October 2018 with pain in the left scapula, PET scan showed enlarging area, no new lesions in the chest abdomen and pelvis  1  I will call Radiation Oncology to assess if they can deliver extra radiation therapy to the left scapula  2   I will change therapy to Alimta 500 milligram/meter squared, carboplatin AUC 5 every 3 weeks, side effects of chemotherapy that are but not limited to anaphylactic reaction, nausea, vomiting, renal failure, pancytopenia with told patient agree to proceed  Vitamin B12 1000 mcg IM shot every 3 weeks, multivitamin 1 tab p o  Daily  Follow-up every 3 weeks with CBC, CMP  Patient to continue prednisone 10 mg p o  Daily, however she will stop prednisone the day before the day of the day after chemotherapy and she will take dexamethasone for Alimta           HPI:  28-year-old  female who was admitted to the hospital with arrhythmia, was found to have right lower lobe infiltrate in August 2016, treated with antibiotics however repeat chest x-ray showed persistent right lower lobe infiltrate  Subsequently the patient had a CT scan of the chest showed a right perihilar mass, subcarinal lymphadenopathy, lytic lesion of the right costovertebral junction at T10 level   PET scan on October 2016 showed a right perihilar mass measuring 3 5 cm with SUV of 8 9, subcarinal lymph nodes measuring 3 4 x 2 2 cm with SUV of 9 2  Nodule in the left upper lobe lung measured 2 x 1 1 cm   There was a lytic lesion involving the right 10th costovertebral junction with SUV of 14  4      Biopsy showed non-small cell carcinoma with features suggesting of adenocarcinoma positive for CK 7, CK 19, CA-19-9, ANEUDY-3, partially positive for P40, p63, negative for TTF-1     molecular test were negative for EGFR mutation, Ross 1 mutation ALK gene rearrangement, PDL-1 expression was 60%     current therapy nivolumab 240 mg flat dose every 2 weeks initiated April 2018 secondary to progression of disease of the lung and left scapula, prednisone 10 mg p o   Daily to prevent pneumonitis  Progression of disease in the left scapula by the PET scan in October 2018, no new lesions were seen      Previous Treatment:Pembrolizumab 200 mg IV flat dose every 3 weeks initiated in December 2016, Finished in May 2017 secondary to grade 3 pneumonitis   radiation therapy to the left scapula in October 2017               Test Results:    Imaging: Nm Pet Ct Skull Base To Mid Thigh    Result Date: 10/22/2018  Narrative: PET/CT SCAN INDICATION: History of metastatic lung cancer  Restaging exam   Persistent left scapular pain  Additional history of endometrial cancer in 2000  C34 92: Malignant neoplasm of unspecified part of left bronchus or lung C41 9: 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, unspecified MODIFIER: PS COMPARISON: PET/CT from 3/29/2018, chest CT from 8/23/2018 CELL TYPE:  non-small cell carcinoma w/ features s/o adenocarcinoma (T10 bone bx 10/18/16) TECHNIQUE:   8 5 mCi F-18-FDG administered IV  Multiplanar attenuation corrected and non attenuation corrected PET images are available for interpretation, and contiguous, low dose, axial CT sections were obtained from the skull base through the femurs   Intravenous contrast material was not utilized  This examination, like all CT scans performed in the [facility], was performed utilizing techniques to minimize radiation dose exposure, including the use of iterative reconstruction and automated exposure control  Fasting serum glucose: 165 mg/dl FINDINGS: VISUALIZED BRAIN:   No acute abnormalities are seen  HEAD/NECK:   There is a physiologic distribution of FDG  No FDG avid cervical adenopathy is seen  CT images: Scattered mucosal thickening is noted of the ethmoid air cells  CHEST:   Mild FDG uptake in the left upper lobe lesion, SUV max of 2 4, previously 4 2  This lesion measures 2 9 x 1 3 cm in size, image 83 series 3, stable  Scattered stable subcentimeter pulmonary nodules again noted, not FDG avid but likely too small to characterize by PET  Mild patchy opacity in the right upper lobe inferiorly, stable  CT images: Scattered coronary artery calcifications noted  Small hiatal hernia noted  ABDOMEN:   No FDG avid soft tissue lesions are seen   CT images: Prior cholecystectomy  Focal scarring in the right kidney midpole with associated cortical calcification  Scattered colonic diverticulosis  1 5 cm cystic lesion at the pancreatic body, unchanged  No focal FDG uptake here  PELVIS: No FDG avid soft tissue lesions are seen  CT images: Multiple surgical clips in the retroperitoneum and along the iliac chains  Small 11 mm ovoid hypodense focus at the left uterine bed may be calcified  OSSEOUS STRUCTURES: Enlarging FDG avid left scapular lesion, SUV max of 14 7, previously 8 3  This measures approximately 4 0 x 3 1 cm in size on CT image 77 series 3  There is erosion of the underlying scapular bone  This is not definitely seen on the prior CT  No focal FDG uptake at the right T10 costovertebral junction  CT images: Otherwise stable appearance  Impression: 1  Mild FDG uptake in the left upper lobe lesion, decreased from the prior examination  2  Further increased FDG uptake in the enlarging left scapular lesion compatible with progression  3  Stable small pancreatic cystic lesion  This is not FDG avid but this does not exclude the possibility of a cystic neoplasm  Recommend continued follow-up    Workstation performed: [id number]       Labs:   Lab Results   Component Value Date    WBC 6 83 10/08/2018    HGB 11 5 10/08/2018    HCT 36 8 10/08/2018    MCV 83 10/08/2018     10/08/2018     Lab Results   Component Value Date     10/08/2018    K 3 8 10/08/2018     10/08/2018    CO2 28 10/08/2018    ANIONGAP 9 11/23/2015    BUN 22 10/08/2018    CREATININE 0 83 10/08/2018    GLUCOSE 149 (H) 11/23/2015    GLUF 119 (H) 10/08/2018    CALCIUM 8 7 10/08/2018    AST 14 10/08/2018    ALT 24 10/08/2018    ALKPHOS 81 10/08/2018    PROT 6 8 11/23/2015    BILITOT 0 65 11/23/2015    EGFR 72 10/08/2018       No results found for: IRON, TIBC, FERRITIN    No results found for: [id number]      ROS:   Review of Systems   Constitutional: Negative for activity change, appetite change, diaphoresis, fatigue, fever and unexpected weight change  HENT: Negative for facial swelling, hearing loss, rhinorrhea, sinus pain, sinus pressure, sneezing, sore throat and tinnitus  Eyes: Negative for photophobia, pain, discharge, redness, itching and visual disturbance  Respiratory: Negative for apnea and chest tightness  Cardiovascular: Negative for chest pain, palpitations and leg swelling  Gastrointestinal: Negative for abdominal distention, abdominal pain, blood in stool, constipation, diarrhea, nausea, rectal pain and vomiting  Endocrine: Negative for cold intolerance, heat intolerance, polydipsia and polyphagia  Genitourinary: Negative for difficulty urinating, dyspareunia, frequency, hematuria, pelvic pain and urgency  Musculoskeletal: Negative for arthralgias, back pain, gait problem, joint swelling and myalgias  Left scapula pain   Skin: Negative for color change, pallor and rash  Allergic/Immunologic: Negative for environmental allergies and food allergies  Neurological: Negative for dizziness, tremors, seizures, syncope, speech difficulty, numbness and headaches  Hematological: Negative for adenopathy  Does not bruise/bleed easily  Psychiatric/Behavioral: Negative for agitation, confusion, dysphoric mood, hallucinations and suicidal ideas  Current Medications: Reviewed  Allergies: Reviewed  PMH/FH/SH:  Reviewed      Physical Exam:    Body surface area is 2 05 meters squared      Wt Readings from Last 3 Encounters:   10/26/18 103 kg (226 lb)   10/16/18 104 kg (229 lb 11 2 oz)   10/12/18 103 kg (227 lb)        Temp Readings from Last 3 Encounters:   10/26/18 (!) 97 3 °F (36 3 °C) (Tympanic)   10/12/18 (!) 96 7 °F (35 9 °C) (Tympanic)   10/09/18 98 °F (36 7 °C) (Oral)        BP Readings from Last 3 Encounters:   10/26/18 131/71   10/16/18 122/68   10/12/18 160/80         Pulse Readings from Last 3 Encounters:   10/26/18 66   10/16/18 68   10/12/18 66        Physical Exam   Constitutional: She is oriented to person, place, and time  She appears well-developed and well-nourished  No distress  HENT:   Head: Normocephalic and atraumatic  Mouth/Throat: Oropharynx is clear and moist  No oropharyngeal exudate  Eyes: Pupils are equal, round, and reactive to light  Conjunctivae and EOM are normal    Neck: Normal range of motion  Neck supple  No tracheal deviation present  No thyromegaly present  Cardiovascular: Normal rate and regular rhythm  Exam reveals no gallop and no friction rub  No murmur heard  Pulmonary/Chest: Effort normal and breath sounds normal  No respiratory distress  She has no wheezes  She has no rales  She exhibits no tenderness  Abdominal: Soft  Bowel sounds are normal  She exhibits no distension and no mass  There is no tenderness  There is no rebound and no guarding  Musculoskeletal: Normal range of motion  Bulging area of the left scapula measuring 8 x 8 cm   Lymphadenopathy:     She has no cervical adenopathy  Neurological: She is alert and oriented to person, place, and time  Skin: Skin is warm and dry  No rash noted  She is not diaphoretic  No erythema  No pallor  Psychiatric: She has a normal mood and affect  Her behavior is normal  Judgment and thought content normal    Vitals reviewed  Goals and Barriers:  Current Goal: Minimize effects of disease  Barriers: None  Patient's Capacity to Self Care:  Patient is able to self care      Code Status: [unfilled]

## 2018-10-29 ENCOUNTER — OFFICE VISIT (OUTPATIENT)
Dept: PALLIATIVE MEDICINE | Facility: CLINIC | Age: 68
End: 2018-10-29
Payer: MEDICARE

## 2018-10-29 VITALS
DIASTOLIC BLOOD PRESSURE: 70 MMHG | TEMPERATURE: 97.9 F | HEART RATE: 68 BPM | HEIGHT: 63 IN | BODY MASS INDEX: 39.94 KG/M2 | WEIGHT: 225.4 LBS | RESPIRATION RATE: 16 BRPM | OXYGEN SATURATION: 99 % | SYSTOLIC BLOOD PRESSURE: 124 MMHG

## 2018-10-29 DIAGNOSIS — F41.9 ANXIETY: ICD-10-CM

## 2018-10-29 DIAGNOSIS — G89.3 CANCER RELATED PAIN: Primary | Chronic | ICD-10-CM

## 2018-10-29 DIAGNOSIS — C34.92 ADENOCARCINOMA OF LEFT LUNG, STAGE 4 (HCC): ICD-10-CM

## 2018-10-29 PROCEDURE — 99214 OFFICE O/P EST MOD 30 MIN: CPT | Performed by: NURSE PRACTITIONER

## 2018-10-29 NOTE — PROGRESS NOTES
Palliative and Supportive Care   Raghu Griffith 77 y o  female [id number]    Assessment/Plan:  1  Cancer related pain    2  Adenocarcinoma of left lung, stage 4 ([address] )    3  Anxiety      Symptom management: left shoulder/ scapular pain related to metastasis, anxiety  - venlafaxine 37 5mg q HS- anxiety well-controlled  - oxycodone IR 5mg q 4h PRN (taking on schedule during day)  - constipation controlled by diet and PRN Miralax  - has ondansetron on hand in case of chemo-associated nausea   - advised to call if she develops nausea not controlled by ondansetron    GOC: treatment-focused, planning to start new chemotherapy regimen next week    Follow up in one month      Subjective    Chief Concern  Follow up visit for:  Symptom management, cancer-related pain         History of Present Illness  Patient ID: Avila Ho is a 77 y o  female with 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  Previously on pembrolizumab, discontinued due to pneumonitis  S/p palliative RT to the thoracic spinal met in 12/2016 and to lesion of left scapular region in 10/2017  Found to have progression of left scapular met and of nodule of left upper lobe of lung  Started on nivolumab with low-dose prednisone  Imaging in Oct 2018 demonstrated progression of left scapular region; no new lesions  Patient will start Alimta and carboplatin next week - this will be every 3 weeks  Follows with Dr London Gonzalez  Patient called office two weeks ago with worsened pain of left scapular region  Started on oxycodone 5mg  She has been taking this about 4x/day (early morning, mid-morning, late afternoon, bedtime) with good pain relief  She has not had constipation (controlled with dietary changes or PRN Miralax)  She has been sleeping well  Mood stable  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  She has made arrangements for his care in the event that she becomes unable to care for him  A cousin would take over the care           The following portions of the patient's history were reviewed and updated as appropriate: allergies, current medications, past family history, past medical history, past social history, past surgical history and problem list       Visit Information    Accompanied By: No one  Source of History: Self  History Limitations: None    ROS  Review of Systems   Constitutional: Positive for fatigue  Negative for activity change and appetite change  Gastrointestinal: Negative  Neurological: Negative  Psychiatric/Behavioral: Negative  Objective     Physical Exam   Constitutional: She is oriented to person, place, and time  She appears well-developed and well-nourished  She is cooperative  No distress  Pulmonary/Chest: Effort normal    Neurological: She is alert and oriented to person, place, and time  Skin: Skin is warm and dry  Psychiatric: She has a normal mood and affect   Cognition and memory are normal          /70 (BP Location: Right arm, Patient Position: Sitting, Cuff Size: Standard)   Pulse 68   Temp 97 9 °F (36 6 °C) (Oral)   Resp 16   Ht 5' 3" (1 6 m)   Wt 102 kg (225 lb 6 4 oz)   LMP  (LMP Unknown)   SpO2 99%   BMI 39 93 kg/m²       - ECOG Performance Status: 0      Current Outpatient Prescriptions:     ondansetron (ZOFRAN-ODT) 4 mg disintegrating tablet, Take 4 mg by mouth every 6 (six) hours as needed for nausea or vomiting, Disp: , Rfl:     Albuterol Sulfate (VENTOLIN HFA IN), Inhale, Disp: , Rfl:     apixaban (ELIQUIS) 5 mg, Take one tablet twice daily, Disp: 180 tablet, Rfl: 3    Calcium Carb-Cholecalciferol 600-800 MG-UNIT TABS, Take 1 tablet by mouth 2 (two) times a day, Disp: , Rfl:     Cholecalciferol (VITAMIN D) 2000 units CAPS, Take by mouth, Disp: , Rfl:     cyanocobalamin (VITAMIN B-12) 100 mcg tablet, Take by mouth daily, Disp: , Rfl:     dexamethasone (DECADRON) 4 mg tablet, Take one tablet by mouth twice daily with food the day before chemo, the day of chemo, and the day after chemo , Disp: 30 tablet, Rfl: 1    dexamethasone (DECADRON) 4 mg tablet, Take one tablet twice daily by mouth with food the day before chemo, the day of chemo, and the day after chemo , Disp: 30 tablet, Rfl: 1    Linagliptin (TRADJENTA) 5 MG TABS, Take 5 mg by mouth daily, Disp: , Rfl:     metFORMIN (GLUCOPHAGE) 1000 MG tablet, Take 1,000 mg by mouth 2 (two) times a day with meals  , Disp: , Rfl:     metoprolol tartrate (LOPRESSOR) 25 mg tablet, Take 1 tablet (25 mg total) by mouth every 12 (twelve) hours, Disp: 180 tablet, Rfl: 3    omeprazole (PriLOSEC) 20 mg delayed release capsule, Take 1 capsule by mouth daily, Disp: , Rfl:     oxyCODONE (ROXICODONE) 5 mg immediate release tablet, Take 1 tablet (5 mg total) by mouth every 4 (four) hours as needed (pain) Max Daily Amount: 30 mg, Disp: 60 tablet, Rfl: 0    predniSONE 10 mg tablet, TAKE 1 TABLET (10 MG TOTAL) BY MOUTH DAILY, Disp: 90 tablet, Rfl: 1    sotalol (BETAPACE) 80 mg tablet, Take 1 tablet (80 mg total) by mouth every 12 (twelve) hours, Disp: 180 tablet, Rfl: 3    venlafaxine (EFFEXOR) 75 mg tablet, Take 0 5 tablets (37 5 mg total) by mouth daily, Disp: 45 tablet, Rfl: [address] and Supportive Care  [phone]        Representatives have queried the patient's controlled substance dispensing history in the Prescription Drug Monitoring Program in compliance with the [facility] regulations before I have prescribed any controlled substances  The prescription history is consistent with prescribed therapy and our practice policies

## 2018-10-30 RX ORDER — ONDANSETRON 4 MG/1
4 TABLET, ORALLY DISINTEGRATING ORAL EVERY 6 HOURS PRN
COMMUNITY
End: 2019-01-11

## 2018-10-31 ENCOUNTER — TELEPHONE (OUTPATIENT)
Dept: HEMATOLOGY ONCOLOGY | Facility: CLINIC | Age: 68
End: 2018-10-31

## 2018-10-31 NOTE — TELEPHONE ENCOUNTER
Spoke to patient regarding question regarding being around vaccinated people  Patient verbalizes understanding that it is ok to be around vaccinated people

## 2018-10-31 NOTE — TELEPHONE ENCOUNTER
Patient called has question,one of the chemo medications info said she should not be around anyone that was vaccinated  Asking is there is a time limit to this   Concerned because she has grandchild

## 2018-11-02 ENCOUNTER — APPOINTMENT (OUTPATIENT)
Dept: LAB | Facility: CLINIC | Age: 68
End: 2018-11-02
Payer: MEDICARE

## 2018-11-02 DIAGNOSIS — C34.92 ADENOCARCINOMA OF LEFT LUNG, STAGE 4 (HCC): ICD-10-CM

## 2018-11-02 LAB
ALBUMIN SERPL BCP-MCNC: 2.6 G/DL (ref 3.5–5)
ALP SERPL-CCNC: 191 U/L (ref 46–116)
ALT SERPL W P-5'-P-CCNC: 53 U/L (ref 12–78)
ANION GAP SERPL CALCULATED.3IONS-SCNC: 8 MMOL/L (ref 4–13)
AST SERPL W P-5'-P-CCNC: 21 U/L (ref 5–45)
BASOPHILS # BLD AUTO: 0.03 THOUSANDS/ΜL (ref 0–0.1)
BASOPHILS NFR BLD AUTO: 0 % (ref 0–1)
BILIRUB SERPL-MCNC: 0.3 MG/DL (ref 0.2–1)
BUN SERPL-MCNC: 17 MG/DL (ref 5–25)
CALCIUM SERPL-MCNC: 9 MG/DL (ref 8.3–10.1)
CHLORIDE SERPL-SCNC: 102 MMOL/L (ref 100–108)
CO2 SERPL-SCNC: 29 MMOL/L (ref 21–32)
CREAT SERPL-MCNC: 0.77 MG/DL (ref 0.6–1.3)
EOSINOPHIL # BLD AUTO: 0.17 THOUSAND/ΜL (ref 0–0.61)
EOSINOPHIL NFR BLD AUTO: 3 % (ref 0–6)
ERYTHROCYTE [DISTWIDTH] IN BLOOD BY AUTOMATED COUNT: 13.5 % (ref 11.6–15.1)
GFR SERPL CREATININE-BSD FRML MDRD: 78 ML/MIN/1.73SQ M
GLUCOSE P FAST SERPL-MCNC: 147 MG/DL (ref 65–99)
HCT VFR BLD AUTO: 34.2 % (ref 34.8–46.1)
HGB BLD-MCNC: 10.7 G/DL (ref 11.5–15.4)
IMM GRANULOCYTES # BLD AUTO: 0.02 THOUSAND/UL (ref 0–0.2)
IMM GRANULOCYTES NFR BLD AUTO: 0 % (ref 0–2)
LYMPHOCYTES # BLD AUTO: 1.21 THOUSANDS/ΜL (ref 0.6–4.47)
LYMPHOCYTES NFR BLD AUTO: 18 % (ref 14–44)
MCH RBC QN AUTO: 25.8 PG (ref 26.8–34.3)
MCHC RBC AUTO-ENTMCNC: 31.3 G/DL (ref 31.4–37.4)
MCV RBC AUTO: 82 FL (ref 82–98)
MONOCYTES # BLD AUTO: 0.6 THOUSAND/ΜL (ref 0.17–1.22)
MONOCYTES NFR BLD AUTO: 9 % (ref 4–12)
NEUTROPHILS # BLD AUTO: 4.65 THOUSANDS/ΜL (ref 1.85–7.62)
NEUTS SEG NFR BLD AUTO: 70 % (ref 43–75)
NRBC BLD AUTO-RTO: 0 /100 WBCS
PLATELET # BLD AUTO: 257 THOUSANDS/UL (ref 149–390)
PMV BLD AUTO: 9.9 FL (ref 8.9–12.7)
POTASSIUM SERPL-SCNC: 4 MMOL/L (ref 3.5–5.3)
PROT SERPL-MCNC: 6.7 G/DL (ref 6.4–8.2)
RBC # BLD AUTO: 4.15 MILLION/UL (ref 3.81–5.12)
SODIUM SERPL-SCNC: 139 MMOL/L (ref 136–145)
WBC # BLD AUTO: 6.68 THOUSAND/UL (ref 4.31–10.16)

## 2018-11-02 PROCEDURE — 36415 COLL VENOUS BLD VENIPUNCTURE: CPT

## 2018-11-02 PROCEDURE — 80053 COMPREHEN METABOLIC PANEL: CPT

## 2018-11-02 PROCEDURE — 85025 COMPLETE CBC W/AUTO DIFF WBC: CPT

## 2018-11-05 ENCOUNTER — CLINICAL SUPPORT (OUTPATIENT)
Dept: RADIATION ONCOLOGY | Facility: CLINIC | Age: 68
End: 2018-11-05
Payer: MEDICARE

## 2018-11-05 ENCOUNTER — RADIATION ONCOLOGY CONSULT (OUTPATIENT)
Dept: RADIATION ONCOLOGY | Facility: CLINIC | Age: 68
End: 2018-11-05
Attending: RADIOLOGY
Payer: MEDICARE

## 2018-11-05 VITALS
HEIGHT: 63 IN | WEIGHT: 225.2 LBS | DIASTOLIC BLOOD PRESSURE: 70 MMHG | HEART RATE: 70 BPM | BODY MASS INDEX: 39.9 KG/M2 | SYSTOLIC BLOOD PRESSURE: 150 MMHG | RESPIRATION RATE: 16 BRPM | OXYGEN SATURATION: 99 % | TEMPERATURE: 97.4 F

## 2018-11-05 DIAGNOSIS — C34.92 ADENOCARCINOMA OF LEFT LUNG, STAGE 4 (HCC): Primary | ICD-10-CM

## 2018-11-05 DIAGNOSIS — G89.3 CANCER RELATED PAIN: Chronic | ICD-10-CM

## 2018-11-05 DIAGNOSIS — C34.92 ADENOCARCINOMA OF LEFT LUNG, STAGE 4 (HCC): ICD-10-CM

## 2018-11-05 PROCEDURE — 77290 THER RAD SIMULAJ FIELD CPLX: CPT | Performed by: RADIOLOGY

## 2018-11-05 PROCEDURE — 77470 SPECIAL RADIATION TREATMENT: CPT | Performed by: RADIOLOGY

## 2018-11-05 PROCEDURE — 99215 OFFICE O/P EST HI 40 MIN: CPT | Performed by: RADIOLOGY

## 2018-11-05 PROCEDURE — 77334 RADIATION TREATMENT AID(S): CPT | Performed by: RADIOLOGY

## 2018-11-05 RX ORDER — SODIUM CHLORIDE 9 MG/ML
20 INJECTION, SOLUTION INTRAVENOUS CONTINUOUS
Status: DISCONTINUED | OUTPATIENT
Start: 2018-11-06 | End: 2018-11-09 | Stop reason: HOSPADM

## 2018-11-05 RX ORDER — CYANOCOBALAMIN 1000 UG/ML
1000 INJECTION INTRAMUSCULAR; SUBCUTANEOUS ONCE
Status: COMPLETED | OUTPATIENT
Start: 2018-11-06 | End: 2018-11-06

## 2018-11-05 RX ORDER — OXYCODONE HYDROCHLORIDE 5 MG/1
5 TABLET ORAL EVERY 4 HOURS PRN
Qty: 100 TABLET | Refills: 0 | Status: SHIPPED | OUTPATIENT
Start: 2018-11-05 | End: 2018-12-03 | Stop reason: SDUPTHER

## 2018-11-05 NOTE — PROGRESS NOTES
[address]as  1950  Ms He Robertson is a 77 y o  female    Chief Complaint   Patient presents with    Consult     Bone metastasis     9/21/17  Consult Dr Gifty De Dios for left scapular metastases  Palliative radiation therapy in 10 treatments, dose 30 Gy with minimal side effects will effectively control the bone destruction with its complications and avoid higher level of pain in the future  10/3 - 10/16/17  Radiation given as noted above  8/23/18 CT chest  IMPRESSION:  Unchanged part solid 2 7 x 1 3 cm left upper lobe pulmonary nodule consistent with patient's primary lung cancer    Several additional small pulmonary nodules are also unchanged  Unchanged lytic metastasis in the left scapula and at the right costovertebral junction of T10  Unchanged 1 4 x 1 6 cm cystic lesion in the pancreatic body  10/22/18 PET/CT:  OSSEOUS STRUCTURES:  Enlarging FDG avid left scapular lesion, SUV max of 14 7, previously 8 3  This measures approximately 4 0 x 3 1 cm in size on CT image 77 series 3  There is erosion of the underlying scapular bone  IMPRESSION:  1  Mild FDG uptake in the left upper lobe lesion, decreased from the prior examination  2  Further increased FDG uptake in the enlarging left scapular lesion compatible with progression  3  Stable small pancreatic cystic lesion  This is not FDG avid but this does not exclude the possibility of a cystic neoplasm  10/26/18 Dr Brandon Moreno:  Left scapular pain  Radiation Oncology to assess if they can deliver extra radiation therapy to the left scapula  Change therapy to Alimta, carboplatin every 3 weeks  10/29/18  Follows with Palliative Care  11/5/18  Consult radiation oncology  Cancer Staging  No matching staging information was found for the patient  Oncology History    9/21/17  Consult Dr Gifty De Dios for left scapular metastases   Palliative radiation therapy in 10 treatments, dose 30 Gy with minimal side effects will effectively control the bone destruction with its complications and avoid higher level of pain in the future  10/3 - 10/16/17  Radiation given as noted above  8/23/18 CT chest  IMPRESSION:  Unchanged part solid 2 7 x 1 3 cm left upper lobe pulmonary nodule consistent with patient's primary lung cancer    Several additional small pulmonary nodules are also unchanged  Unchanged lytic metastasis in the left scapula and at the right costovertebral junction of T10  Unchanged 1 4 x 1 6 cm cystic lesion in the pancreatic body  10/22/18 PET/CT:  OSSEOUS STRUCTURES:  Enlarging FDG avid left scapular lesion, SUV max of 14 7, previously 8 3  This measures approximately 4 0 x 3 1 cm in size on CT image 77 series 3  There is erosion of the underlying scapular bone  IMPRESSION:  1  Mild FDG uptake in the left upper lobe lesion, decreased from the prior examination  2  Further increased FDG uptake in the enlarging left scapular lesion compatible with progression  3  Stable small pancreatic cystic lesion  This is not FDG avid but this does not exclude the possibility of a cystic neoplasm  10/26/18 Dr Lu Bernal:  Left scapular pain  Radiation Oncology to assess if they can deliver extra radiation therapy to the left scapula  Change therapy to Alimta, carboplatin every 3 weeks  10/29/18  Follows with Palliative Care  11/5/18  Consult radiation oncology  Adenocarcinoma of lung, stage 4 ([address] )    10/18/2016 Initial Diagnosis     Adenocarcinoma of lung, stage 4 ([address] )         10/18/2016 Biopsy     Final Diagnosis  A  Bone, T10, biopsy:     - Non-small cell carcinoma with features suggesting adenocarcinoma; see note               11/17/2016 Biopsy     Final Diagnosis  Lymph Node, Level 7: Conclusive evidence of Malignancy  Poorly differentiated non-small cell carcinoma      Lung, right main stem bronchus, biopsy:              - Poorly differentiated adenocarcinoma           12/15/2016 - 6/1/2017 Chemotherapy Pembrolizumab 200 mg IV flat dose every 3 weeks          9/13/2017 Biopsy     Bone, left scapula, biopsy:  -  Positive for malignancy, consistent with metastatic adenocarcinoma              10/3/2017 - 10/16/2017 Radiation     palliative course of radiation therapy to the left scapular lesion to 3000 cGy( metastatic from adenocarcinoma of the lung)              4/10/2018 - 10/9/2018 Chemotherapy     nivolumab with prednisone 10 mg p o          Chemotherapy     11/6/18   Alimta 500 milligram/meter squared, carboplatin AUC 5 every 3 weeks,           Malignant neoplasm of bone with metastases ([facility] [address] )    5/24/2017 Initial Diagnosis     Malignant neoplasm of bone with metastases (HCC)          Clinical Trial: no    Screening  Tobacco  Current tobacco user: no  If yes, brief counseling provided: NA    Hypertension  Hypertension screening performed: yes  Normotensive:  no  If no, referred to PCP: n/a    Depression Screening  Screened for depression using PHQ-2: yes    Screened for depression using PHQ-9:  no  Screening positive or negative:  negative  If score >4, was any of the following actions taken?    Additional evaluation for depression, suicide risk assesment, referral to PCP or psychiatry, medication started:  n/a    Advanced Care Planning for Patients >65 years  Advanced Care Planning Discussed:  yes  Patient named surrogate decision maker or care plan in chart: yes        Health Maintenance   Topic Date Due    Urinary Incontinence Screening  07/07/2013    DM Eye Exam  01/27/2018    Fall Risk  02/28/2019    HEMOGLOBIN A1C  03/10/2019    Diabetic Foot Exam  04/30/2019    URINE MICROALBUMIN  09/10/2019    CRC Screening: Colonoscopy  04/30/2028    DTaP,Tdap,and Td Vaccines (2 - Td) 10/06/2028    INFLUENZA VACCINE  Completed    Pneumococcal PPSV23/PCV13 65+ Years / High and Highest Risk  Completed    Lung Cancer Screening  Excluded       Patient Active Problem List   Diagnosis    Atrial fibrillation with rapid ventricular response (HCC)    Class 2 severe obesity due to excess calories with serious comorbidity and body mass index (BMI) of 38 0 to 38 9 in adult [facility])    Adenocarcinoma of lung, stage 4 (HCC)    Hyponatremia    Chronic diastolic heart failure (HCC)    Cancer related pain    Abnormal chest x-ray    Atrial fibrillation (HCC)    Benign essential hypertension    Diabetes mellitus type 2, uncomplicated (HCC)    Malignant neoplasm of bone with metastases (HCC)    Acid reflux    Hyperlipidemia    Insomnia    Major depressive disorder with single episode, in full remission ([address] )    Vitamin D deficiency    Adhesive capsulitis of shoulder    Osteoarthritis of knee    Anxiety     Past Medical History:   Diagnosis Date    Arthritis     Atrial fibrillation ([address] )     Diabetes mellitus [facility] [address] [facility]     Diabetes mellitus type 2, uncomplicated ([facility] [address] )     Last assessed: 8/17/17    Essential hypertension     Frozen shoulder     L shoulder    GERD (gastroesophageal reflux disease)     Hx of cancer of uterus     Last assessed: 8/21/15    Hyperlipidemia     Lung mass     diagnosed 9/2016    Malignant neoplasm without specification of site ([facility] [address] )     Skin cancer, basal cell     right eye area    Stage 4 lung cancer ([facility] [address] )      Past Surgical History:   Procedure Laterality Date    APPENDECTOMY      BRONCHOSCOPY N/A 11/17/2016    Procedure: BRONCHOSCOPY FLEXIBLE;  Surgeon: Rob Sol MD;  Location: [facility];  Service:    [facility] CHOLECYSTECTOMY      GALLBLADDER SURGERY      HYSTERECTOMY  2000    Total abdominal    LARYNGOSCOPY      Flexible Fiberoptic, (Therapeutic), Resolved: 11/17/16    MOHS SURGERY      Micrographic Surgery Face    AZ BRONCHOSCOPY NEEDLE BX TRACHEA MAIN STEM&/BRON N/A 11/17/2016    Procedure: EBUS; FROZEN SECTION ;  Surgeon: Rob Sol MD;  [facility]: [facility];  Service: Thoracic    AZ BRONCHOSCOPY,DIAGNOSTIC N/A 5/24/2017    Procedure: BRONCHOSCOPY FLEXIBLE;  Surgeon: Puam Daniel Garrison Mcginnis MD;  Location: [facility]; Service: Pulmonary    TONSILECTOMY AND ADNOIDECTOMY      TOTAL KNEE ARTHROPLASTY Right 09/23/2014     Family History   Problem Relation Age of Onset    Heart disease Father         cardiac disorder    Hypertension Father     Arthritis Father     Stroke Father         cerebrovascular accident    Diabetes Mother     Heart disease Mother     Stroke Mother         cerebrovascular accident   Ardeth Needs Dementia Mother    Ardeth Needs Hypertension Mother     Thyroid disease Mother     Cancer Maternal Grandfather         of unknown origin    Cancer Family     Depression Family     Diabetes Family     Hyperlipidemia Family         essential    Heart disease Family     Hypertension Family     Stroke Family         syndrome    Lung cancer Paternal Uncle      Social History     Social History    Marital status:      Spouse name: N/A    Number of children: N/A    Years of education: N/A     Occupational History    retired      Social History Main Topics    Smoking status: Former Smoker     Packs/day: 1 00     Years: 50 00     Types: Cigarettes     Start date: 1962     Quit date: 2000    Smokeless tobacco: Never Used      Comment: Quit in 2000    Alcohol use No    Drug use: No    Sexual activity: Not Currently     Other Topics Concern    Not on file     Social History Narrative    Lives with family      Daily caffeinated coffee consumption       Current Outpatient Prescriptions:     Albuterol Sulfate (VENTOLIN HFA IN), Inhale, Disp: , Rfl:     apixaban (ELIQUIS) 5 mg, Take one tablet twice daily, Disp: 180 tablet, Rfl: 3    Calcium Carb-Cholecalciferol 600-800 MG-UNIT TABS, Take 1 tablet by mouth 2 (two) times a day, Disp: , Rfl:     Cholecalciferol (VITAMIN D) 2000 units CAPS, Take by mouth, Disp: , Rfl:     cyanocobalamin (VITAMIN B-12) 100 mcg tablet, Take by mouth daily, Disp: , Rfl:     dexamethasone (DECADRON) 4 mg tablet, Take one tablet by mouth twice daily with food the day before chemo, the day of chemo, and the day after chemo , Disp: 30 tablet, Rfl: 1    dexamethasone (DECADRON) 4 mg tablet, Take one tablet twice daily by mouth with food the day before chemo, the day of chemo, and the day after chemo , Disp: 30 tablet, Rfl: 1    Linagliptin (TRADJENTA) 5 MG TABS, Take 5 mg by mouth daily, Disp: , Rfl:     metFORMIN (GLUCOPHAGE) 1000 MG tablet, Take 1,000 mg by mouth 2 (two) times a day with meals  , Disp: , Rfl:     metoprolol tartrate (LOPRESSOR) 25 mg tablet, Take 1 tablet (25 mg total) by mouth every 12 (twelve) hours, Disp: 180 tablet, Rfl: 3    omeprazole (PriLOSEC) 20 mg delayed release capsule, Take 1 capsule by mouth daily, Disp: , Rfl:     ondansetron (ZOFRAN-ODT) 4 mg disintegrating tablet, Take 4 mg by mouth every 6 (six) hours as needed for nausea or vomiting, Disp: , Rfl:     oxyCODONE (ROXICODONE) 5 mg immediate release tablet, Take 1 tablet (5 mg total) by mouth every 4 (four) hours as needed (pain) Max Daily Amount: 30 mg, Disp: 100 tablet, Rfl: 0    predniSONE 10 mg tablet, TAKE 1 TABLET (10 MG TOTAL) BY MOUTH DAILY, Disp: 90 tablet, Rfl: 1    sotalol (BETAPACE) 80 mg tablet, Take 1 tablet (80 mg total) by mouth every 12 (twelve) hours, Disp: 180 tablet, Rfl: 3    venlafaxine (EFFEXOR) 75 mg tablet, Take 0 5 tablets (37 5 mg total) by mouth daily, Disp: 45 tablet, Rfl: 1    Allergies   Allergen Reactions    Amoxicillin Rash    Cardizem [Diltiazem] Rash     Rash      Statins Myalgia     Severe muscle aching       Review of Systems:  Review of Systems   Constitutional: Positive for activity change (due to prednisone) and diaphoresis  HENT: Negative  Eyes: Negative  Respiratory: Negative  Cardiovascular: Negative  Gastrointestinal: Negative  Endocrine: Positive for heat intolerance  Genitourinary: Negative  Incontinence; wears pad   Musculoskeletal: Positive for arthralgias, back pain, myalgias and neck pain  Left scapular pain   Skin: Negative  Allergic/Immunologic: Negative  Neurological: Negative  Hematological: Negative  Psychiatric/Behavioral: Negative  Vitals:    11/05/18 1242   BP: 150/70   Pulse: 70   Resp: 16   Temp: (!) 97 4 °F (36 3 °C)   SpO2: 99%   Weight: 102 kg (225 lb 3 2 oz)   Height: 5' 3" (1 6 m)       Pain Score:   7 (left shoulder & radiates to upper arm)    Imaging:Nm Pet Ct Skull Base To Mid Thigh    Result Date: 10/22/2018  Narrative: PET/CT SCAN INDICATION: History of metastatic lung cancer  Restaging exam   Persistent left scapular pain  Additional history of endometrial cancer in 2000  C34 92: Malignant neoplasm of unspecified part of left bronchus or lung C41 9: 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, unspecified MODIFIER: PS COMPARISON: PET/CT from 3/29/2018, chest CT from 8/23/2018 CELL TYPE:  non-small cell carcinoma w/ features s/o adenocarcinoma (T10 bone bx 10/18/16) TECHNIQUE:   8 5 mCi F-18-FDG administered IV  Multiplanar attenuation corrected and non attenuation corrected PET images are available for interpretation, and contiguous, low dose, axial CT sections were obtained from the skull base through the femurs   Intravenous contrast material was not utilized  This examination, like all CT scans performed in the [facility], was performed utilizing techniques to minimize radiation dose exposure, including the use of iterative reconstruction and automated exposure control  Fasting serum glucose: 165 mg/dl FINDINGS: VISUALIZED BRAIN:   No acute abnormalities are seen  HEAD/NECK:   There is a physiologic distribution of FDG  No FDG avid cervical adenopathy is seen  CT images: Scattered mucosal thickening is noted of the ethmoid air cells  CHEST:   Mild FDG uptake in the left upper lobe lesion, SUV max of 2 4, previously 4 2  This lesion measures 2 9 x 1 3 cm in size, image 83 series 3, stable   Scattered stable subcentimeter pulmonary nodules again noted, not FDG avid but likely too small to characterize by PET  Mild patchy opacity in the right upper lobe inferiorly, stable  CT images: Scattered coronary artery calcifications noted  Small hiatal hernia noted  ABDOMEN:   No FDG avid soft tissue lesions are seen  CT images: Prior cholecystectomy  Focal scarring in the right kidney midpole with associated cortical calcification  Scattered colonic diverticulosis  1 5 cm cystic lesion at the pancreatic body, unchanged  No focal FDG uptake here  PELVIS: No FDG avid soft tissue lesions are seen  CT images: Multiple surgical clips in the retroperitoneum and along the iliac chains  Small 11 mm ovoid hypodense focus at the left uterine bed may be calcified  OSSEOUS STRUCTURES: Enlarging FDG avid left scapular lesion, SUV max of 14 7, previously 8 3  This measures approximately 4 0 x 3 1 cm in size on CT image 77 series 3  There is erosion of the underlying scapular bone  This is not definitely seen on the prior CT  No focal FDG uptake at the right T10 costovertebral junction  CT images: Otherwise stable appearance  Impression: 1  Mild FDG uptake in the left upper lobe lesion, decreased from the prior examination  2  Further increased FDG uptake in the enlarging left scapular lesion compatible with progression  3  Stable small pancreatic cystic lesion  This is not FDG avid but this does not exclude the possibility of a cystic neoplasm  Recommend continued follow-up  Workstation performed: [id number]       Teaching:  Had RT x 2  Did not feel need for teaching/booklets

## 2018-11-06 ENCOUNTER — RADIATION THERAPY TREATMENT (OUTPATIENT)
Dept: RADIATION ONCOLOGY | Facility: HOSPITAL | Age: 68
End: 2018-11-06
Attending: RADIOLOGY
Payer: MEDICARE

## 2018-11-06 ENCOUNTER — HOSPITAL ENCOUNTER (OUTPATIENT)
Dept: INFUSION CENTER | Facility: CLINIC | Age: 68
Discharge: HOME | End: 2018-11-06
Payer: MEDICARE

## 2018-11-06 VITALS
HEART RATE: 62 BPM | TEMPERATURE: 98 F | SYSTOLIC BLOOD PRESSURE: 128 MMHG | WEIGHT: 224.5 LBS | OXYGEN SATURATION: 97 % | BODY MASS INDEX: 39.78 KG/M2 | HEIGHT: 63 IN | DIASTOLIC BLOOD PRESSURE: 62 MMHG | RESPIRATION RATE: 18 BRPM

## 2018-11-06 PROCEDURE — 96367 TX/PROPH/DG ADDL SEQ IV INF: CPT

## 2018-11-06 PROCEDURE — 96413 CHEMO IV INFUSION 1 HR: CPT

## 2018-11-06 PROCEDURE — 96411 CHEMO IV PUSH ADDL DRUG: CPT

## 2018-11-06 PROCEDURE — 96372 THER/PROPH/DIAG INJ SC/IM: CPT

## 2018-11-06 PROCEDURE — 77370 RADIATION PHYSICS CONSULT: CPT | Performed by: RADIOLOGY

## 2018-11-06 RX ADMIN — SODIUM CHLORIDE 20 ML/HR: 0.9 INJECTION, SOLUTION INTRAVENOUS at 11:55

## 2018-11-06 RX ADMIN — ONDANSETRON 16 MG: 2 INJECTION INTRAMUSCULAR; INTRAVENOUS at 12:10

## 2018-11-06 RX ADMIN — CYANOCOBALAMIN 1000 MCG: 1000 INJECTION, SOLUTION INTRAMUSCULAR at 14:13

## 2018-11-06 RX ADMIN — CARBOPLATIN 634 MG: 10 INJECTION, SOLUTION INTRAVENOUS at 13:03

## 2018-11-06 RX ADMIN — SODIUM CHLORIDE 1000 MG: 9 INJECTION, SOLUTION INTRAVENOUS at 12:33

## 2018-11-06 NOTE — PROGRESS NOTES
Pt to clinic for alimta, carbo infusion and B 12 injection, Pt tolerated infusion without complications, pt aware of next appointment, declines avs

## 2018-11-07 ENCOUNTER — OFFICE VISIT (OUTPATIENT)
Dept: PULMONOLOGY | Facility: CLINIC | Age: 68
End: 2018-11-07
Payer: MEDICARE

## 2018-11-07 VITALS
WEIGHT: 227.6 LBS | SYSTOLIC BLOOD PRESSURE: 134 MMHG | BODY MASS INDEX: 38.86 KG/M2 | TEMPERATURE: 96.9 F | HEART RATE: 61 BPM | HEIGHT: 64 IN | DIASTOLIC BLOOD PRESSURE: 62 MMHG | OXYGEN SATURATION: 98 %

## 2018-11-07 DIAGNOSIS — R91.8 LUNG NODULE, MULTIPLE: Primary | ICD-10-CM

## 2018-11-07 PROCEDURE — 99214 OFFICE O/P EST MOD 30 MIN: CPT | Performed by: INTERNAL MEDICINE

## 2018-11-07 NOTE — PROGRESS NOTES
Progress note - Pulmonary Medicine   Arthur Hidalgo o 77 y o  female MRN: [id number]       Impression & Plan:     Adenocarcinoma of lung, stage 4 ([address] )  Continue as per Oncology and Radiation Oncology with radiation therapy to left scapula and also with conventional chemotherapy with pemetrexed and carboplatin  Lung nodule, multiple  Stable on CT scan  Patient is up-to-date with vaccination and she received the influenza vaccine this season  ______________________________________________________________________    HPI:    Sneha De Jesus presents today for follow-up of lung cancer and 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  Patient is a very pleasant lady who was treated with Crystal Decent for stage IV non-small cell lung cancer and developed pneumonitis with acute hypoxia that improved with prednisone, she was switched to Opdivo with prednisone and also had radiation therapy to bone metastases to left scapula  Patient continues to have left scapular pain that response to pain medications but her last PET scan showed increase in size of bone metastasis there  She is starting another radiation therapy to that met  And last week Oncology decided to change therapy to pemetrexed and carboplatin  Patient general feels fine, denies any dyspnea on exertion or shortness of breath, denies any cough or sputum production, denies any chest pain, denies any fever or chills or night sweats, denies any weight loss  No hemoptysis but this morning she woke up and found a small bloody spot on her pillow but she denies any hemoptysis or epistaxis or any source of bleeding  Review of Systems:  Review of Systems   Constitutional: Negative  HENT: Negative  Eyes: Negative  Respiratory: Negative  Cardiovascular: Negative  Gastrointestinal: Negative  Endocrine: Negative  Genitourinary: Negative  Musculoskeletal: Negative  Skin: Negative  Allergic/Immunologic: Negative  Neurological: Negative  Hematological: Negative  Psychiatric/Behavioral: Negative  Past medical history, surgical history, and family history were reviewed and updated as appropriate    Social history updates:  History   Smoking Status    Former Smoker    Packs/day: 1 00    Years: 50 00    Types: Cigarettes    Start date: 1962    Quit date: 2000   Smokeless Tobacco    Never Used     Comment: Quit in 2000       PhysicalExamination:  Vitals:   /62 (BP Location: Right arm, Patient Position: Sitting)   Pulse 61   Temp (!) 96 9 °F (36 1 °C) (Tympanic)   Ht 5' 3 5" (1 613 m)   Wt 103 kg (227 lb 9 6 oz)   LMP  (LMP Unknown)   SpO2 98%   BMI 39 69 kg/m²     General: alert, not in acute distress  HEENT: PERRL, no icteric sclera or cyanosis, no thrush, no bleeding or traces of old blood  Neck:  Supple, no lymphadenopathy or thyromegaly, no JVD  Lungs:  Equal breath sounds and clear auscultations bilaterally, no wheezing or crackles  Heart: S1S2 regular, no murmures or gallops  Abdomen: soft, non-tender, bowel sounds  present  Extrimities: no edema, no clubbing or cyanosis  Neuro: Alert and oriented x 3, no focal neurodeficits   Skin: intact, no rashes    Diagnostic Data:  Labs:   I personally reviewed the most recent laboratory data pertinent to today's visit    Lab Results   Component Value Date    WBC 6 68 11/02/2018    HGB 10 7 (L) 11/02/2018    HCT 34 2 (L) 11/02/2018    MCV 82 11/02/2018     11/02/2018     Lab Results   Component Value Date    GLUCOSE 149 (H) 11/23/2015    CALCIUM 9 0 11/02/2018     11/23/2015    K 4 0 11/02/2018    CO2 29 11/02/2018     11/02/2018    BUN 17 11/02/2018    CREATININE 0 77 11/02/2018     No results found for: IGE  Lab Results   Component Value Date    ALT 53 11/02/2018    AST 21 11/02/2018    ALKPHOS 191 (H) 11/02/2018    BILITOT 0 65 11/23/2015         Imaging:  I personally reviewed the images on the [facility] system pertinent to today's visit  Reviewed chest CT scan and PET scan on PACs with patient:     PET scan: IMPRESSION:   1  Mild FDG uptake in the left upper lobe lesion, decreased from the prior examination  2  Further increased FDG uptake in the enlarging left scapular lesion compatible with progression  3  Stable small pancreatic cystic lesion  This is not FDG avid but this does not exclude the possibility of a cystic neoplasm  Recommend continued follow-up  Chest CT scan:IMPRESSION:   Unchanged part solid 2 7 x 1 3 cm left upper lobe pulmonary nodule consistent with patient's primary lung cancer (series 2 images 18 through 23 )   Several additional small pulmonary nodules are also unchanged    Unchanged lytic metastasis in the left scapula and at the right costovertebral junction of T10    Unchanged 1 4 x 1 6 cm cystic lesion in the pancreatic body      Estrella Torres MD

## 2018-11-07 NOTE — ASSESSMENT & PLAN NOTE
Continue as per Oncology and Radiation Oncology with radiation therapy to left scapula and also with conventional chemotherapy with pemetrexed and carboplatin

## 2018-11-08 PROCEDURE — 77307 TELETHX ISODOSE PLAN CPLX: CPT | Performed by: RADIOLOGY

## 2018-11-08 PROCEDURE — 77370 RADIATION PHYSICS CONSULT: CPT | Performed by: RADIOLOGY

## 2018-11-08 PROCEDURE — 77334 RADIATION TREATMENT AID(S): CPT | Performed by: RADIOLOGY

## 2018-11-12 DIAGNOSIS — C79.51 SECONDARY MALIGNANT NEOPLASM OF BONE AND BONE MARROW (HCC): Primary | ICD-10-CM

## 2018-11-12 DIAGNOSIS — C79.52 SECONDARY MALIGNANT NEOPLASM OF BONE AND BONE MARROW (HCC): Primary | ICD-10-CM

## 2018-11-12 PROCEDURE — 77412 RADIATION TX DELIVERY LVL 3: CPT | Performed by: RADIOLOGY

## 2018-11-12 PROCEDURE — 77331 SPECIAL RADIATION DOSIMETRY: CPT | Performed by: RADIOLOGY

## 2018-11-12 PROCEDURE — 77280 THER RAD SIMULAJ FIELD SMPL: CPT | Performed by: RADIOLOGY

## 2018-11-13 PROCEDURE — 77336 RADIATION PHYSICS CONSULT: CPT | Performed by: RADIOLOGY

## 2018-11-13 PROCEDURE — 77412 RADIATION TX DELIVERY LVL 3: CPT | Performed by: RADIOLOGY

## 2018-11-13 PROCEDURE — 77387 GUIDANCE FOR RADJ TX DLVR: CPT | Performed by: RADIOLOGY

## 2018-11-14 PROCEDURE — 77387 GUIDANCE FOR RADJ TX DLVR: CPT | Performed by: RADIOLOGY

## 2018-11-14 PROCEDURE — 77412 RADIATION TX DELIVERY LVL 3: CPT | Performed by: RADIOLOGY

## 2018-11-15 PROCEDURE — 77412 RADIATION TX DELIVERY LVL 3: CPT | Performed by: RADIOLOGY

## 2018-11-15 PROCEDURE — 77387 GUIDANCE FOR RADJ TX DLVR: CPT | Performed by: RADIOLOGY

## 2018-11-16 PROCEDURE — 77387 GUIDANCE FOR RADJ TX DLVR: CPT | Performed by: RADIOLOGY

## 2018-11-16 PROCEDURE — 77412 RADIATION TX DELIVERY LVL 3: CPT | Performed by: RADIOLOGY

## 2018-11-19 ENCOUNTER — APPOINTMENT (OUTPATIENT)
Dept: LAB | Facility: CLINIC | Age: 68
End: 2018-11-19
Payer: MEDICARE

## 2018-11-19 ENCOUNTER — TELEPHONE (OUTPATIENT)
Dept: PALLIATIVE MEDICINE | Facility: CLINIC | Age: 68
End: 2018-11-19

## 2018-11-19 DIAGNOSIS — C79.52 SECONDARY MALIGNANT NEOPLASM OF BONE AND BONE MARROW (HCC): ICD-10-CM

## 2018-11-19 DIAGNOSIS — C79.51 SECONDARY MALIGNANT NEOPLASM OF BONE AND BONE MARROW (HCC): ICD-10-CM

## 2018-11-19 LAB
BASOPHILS # BLD AUTO: 0.01 THOUSANDS/ΜL (ref 0–0.1)
BASOPHILS NFR BLD AUTO: 0 % (ref 0–1)
EOSINOPHIL # BLD AUTO: 0.02 THOUSAND/ΜL (ref 0–0.61)
EOSINOPHIL NFR BLD AUTO: 0 % (ref 0–6)
ERYTHROCYTE [DISTWIDTH] IN BLOOD BY AUTOMATED COUNT: 14.2 % (ref 11.6–15.1)
HCT VFR BLD AUTO: 36.5 % (ref 34.8–46.1)
HGB BLD-MCNC: 11.5 G/DL (ref 11.5–15.4)
IMM GRANULOCYTES # BLD AUTO: 0.03 THOUSAND/UL (ref 0–0.2)
IMM GRANULOCYTES NFR BLD AUTO: 1 % (ref 0–2)
LYMPHOCYTES # BLD AUTO: 0.95 THOUSANDS/ΜL (ref 0.6–4.47)
LYMPHOCYTES NFR BLD AUTO: 16 % (ref 14–44)
MCH RBC QN AUTO: 26 PG (ref 26.8–34.3)
MCHC RBC AUTO-ENTMCNC: 31.5 G/DL (ref 31.4–37.4)
MCV RBC AUTO: 82 FL (ref 82–98)
MONOCYTES # BLD AUTO: 0.57 THOUSAND/ΜL (ref 0.17–1.22)
MONOCYTES NFR BLD AUTO: 10 % (ref 4–12)
NEUTROPHILS # BLD AUTO: 4.22 THOUSANDS/ΜL (ref 1.85–7.62)
NEUTS SEG NFR BLD AUTO: 73 % (ref 43–75)
NRBC BLD AUTO-RTO: 0 /100 WBCS
PLATELET # BLD AUTO: 183 THOUSANDS/UL (ref 149–390)
PMV BLD AUTO: 10.5 FL (ref 8.9–12.7)
RBC # BLD AUTO: 4.43 MILLION/UL (ref 3.81–5.12)
WBC # BLD AUTO: 5.8 THOUSAND/UL (ref 4.31–10.16)

## 2018-11-19 PROCEDURE — 85025 COMPLETE CBC W/AUTO DIFF WBC: CPT

## 2018-11-19 PROCEDURE — 77412 RADIATION TX DELIVERY LVL 3: CPT | Performed by: RADIOLOGY

## 2018-11-19 PROCEDURE — 77387 GUIDANCE FOR RADJ TX DLVR: CPT | Performed by: RADIOLOGY

## 2018-11-19 PROCEDURE — 77417 THER RADIOLOGY PORT IMAGE(S): CPT | Performed by: RADIOLOGY

## 2018-11-19 PROCEDURE — 36415 COLL VENOUS BLD VENIPUNCTURE: CPT

## 2018-11-19 NOTE — TELEPHONE ENCOUNTER
Spoke with patient, shoulder pain is improved but having increased back pain, advised to increase oxycodone to 5 or 10mg q 4h PRN  Does not need refill  Sees oncology tomorrow  Has follow up with palliative care next week

## 2018-11-20 ENCOUNTER — OFFICE VISIT (OUTPATIENT)
Dept: HEMATOLOGY ONCOLOGY | Facility: CLINIC | Age: 68
End: 2018-11-20
Payer: MEDICARE

## 2018-11-20 VITALS
DIASTOLIC BLOOD PRESSURE: 80 MMHG | TEMPERATURE: 97.5 F | OXYGEN SATURATION: 98 % | WEIGHT: 228 LBS | BODY MASS INDEX: 38.93 KG/M2 | HEART RATE: 75 BPM | HEIGHT: 64 IN | SYSTOLIC BLOOD PRESSURE: 130 MMHG | RESPIRATION RATE: 16 BRPM

## 2018-11-20 DIAGNOSIS — C34.92 ADENOCARCINOMA OF LEFT LUNG, STAGE 4 (HCC): Primary | ICD-10-CM

## 2018-11-20 PROCEDURE — 77387 GUIDANCE FOR RADJ TX DLVR: CPT | Performed by: RADIOLOGY

## 2018-11-20 PROCEDURE — 77412 RADIATION TX DELIVERY LVL 3: CPT | Performed by: RADIOLOGY

## 2018-11-20 PROCEDURE — 99215 OFFICE O/P EST HI 40 MIN: CPT | Performed by: PHYSICIAN ASSISTANT

## 2018-11-20 PROCEDURE — 77336 RADIATION PHYSICS CONSULT: CPT | Performed by: RADIOLOGY

## 2018-11-20 NOTE — PROGRESS NOTES
Hematology/Oncology Outpatient Follow- up Note  Yi Griffith 77 y o  female MRN: @ Encounter: [id number]        Date:  11/20/2018      Assessment / Plan:    Stage 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 of the lung with scapula involvement dx 8/2016         Progression of disease in left scapula identified October 2018 on PET scan  She had increased pain in this area  showed enlarging No new lesions in the chest abdomen and pelvis  Additional radiation was able to be given to left scapular region  Dose #10/10 due to be completed 11/26/2018  Systemic treatment changed to Alimta 500 milligram/meter squared, carboplatin AUC 5 every 3 weeks with Vitamin B12 1000 mcg IM shot every 3 weeks, multivitamin 1 tab p o  Daily  11/6/2018  Follow-up every 3 weeks with CBC, CMP    Patient to continue prednisone 10 mg p o  Daily, however she will stop prednisone the day before the day of the day after chemotherapy and she will take dexamethasone for Alimta            History of Present Illness:  Anita Cuevas was admitted to the hospital with arrhythmia and was found to have right lower lobe infiltrate in August 2016  She wasreated with antibiotics however repeat chest x-ray showed persistent right lower lobe infiltrate  Subsequently the patient had a CT scan of the chest which showed a right perihilar mass, subcarinal lymphadenopathy, lytic lesion of the right costovertebral junction at T10 level  PET scan October 2016 showed a right perihilar mass measuring 3 5 cm with SUV of 8 9, subcarinal lymph nodes measuring 3 4 x 2 2 cm with SUV of 9 2  Nodule in the left upper lobe lung measured 2 x 1 1 cm  There was a lytic lesion involving the right 10th costovertebral junction with SUV of 14  4      Biopsy showed non-small cell carcinoma with features suggesting of adenocarcinoma positive for CK 7, CK 19, CA-19-9, ANEUDY-3, partially positive for P40, p63, negative for TTF-1    She had a history of uterine cancer in 2000 status post hysterectomy and did not require radiation or chemotherapy  She is status post bilateral oophorectomy, right knee replacement,   tonsillectomy  She used to smoke for 35 years 1 pack per day however quit smoking 21 years ago  She used hormonal replacement therapy for 3 years  She has a family history significant for skin cancer in her father and coronary artery disease in mother  She has 2 healthy children  She developed pneumonitis with Pembrolizumab requiring discontinuation of therapy for 6 months  She had progression of disease in the left upper lobe as well as the scapula even though she received radiation therapy to the scapula area  Nivolumab 240 mg flat dose every 2 weeks along with prednisone 10 mg p o  Daily initiated April 2018- October 2018  Pet/CT 10/22/2018 compared to PET/CT from 3/29/2018, chest CT from 8/23/2018:  Mild FDG uptake in the left upper lobe lesion, SUV max of 2 4, previously 4 2  This lesion measures 2 9 x 1 3(stable)  Enlarging FDG avid left scapular lesion, SUV max of 14 7, previously 8 3  This measures approximately 4 0 x 3 1 cm in size    Current Therapy:  11/6/2018: Alimta 500 milligram/meter squared, carboplatin AUC 5 every 3 weeks with Vitamin B12 1000 mcg IM shot every 3 weeks, multivitamin 1 tab p o  Daily     Previous Therapy:    1   Pembrolizumab 200 mg IV flat dose every 3 weeks initiated in December 2016, Finished in May 2017 secondary to grade 3 pneumonitis   2   radiation therapy to the left scapula in October 2017   3   Nivolumab 240 mg flat dose every 2 weeks initiated in April 2018 secondary to progression of the disease in the lung and the left scapula, prednisone 10 mg p o  daily to prevent pneumonitis  Nivolumab was continued every 2 weeks because of previous history of pneumonitis on Pembrolizumab    4   Additional 10 radiation tx to left scapula          Interval History:  Pain in left scapula significantly decreased  She is tired  Constipation was an issue  Using miralax now with benefit  Was experiencing back pain  Resolved now that constipation resolved  Minimal nausea  Test Results:        Labs:   Lab Results   Component Value Date    HGB 11 5 11/19/2018    HCT 36 5 11/19/2018    MCV 82 11/19/2018     11/19/2018    WBC 5 80 11/19/2018    NRBC 0 11/19/2018     Lab Results   Component Value Date     11/23/2015    K 4 0 11/02/2018     11/02/2018    CO2 29 11/02/2018    ANIONGAP 9 11/23/2015    BUN 17 11/02/2018    CREATININE 0 77 11/02/2018    GLUCOSE 149 (H) 11/23/2015    GLUF 147 (H) 11/02/2018    CALCIUM 9 0 11/02/2018    AST 21 11/02/2018    ALT 53 11/02/2018    ALKPHOS 191 (H) 11/02/2018    PROT 6 8 11/23/2015    BILITOT 0 65 11/23/2015    EGFR 78 11/02/2018       Imaging: Nm Pet Ct Skull Base To Mid Thigh    Result Date: 10/22/2018  Narrative: PET/CT SCAN INDICATION: History of metastatic lung cancer  Restaging exam   Persistent left scapular pain  Additional history of endometrial cancer in 2000  C34 92: Malignant neoplasm of unspecified part of left bronchus or lung C41 9: 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, unspecified MODIFIER: PS COMPARISON: PET/CT from 3/29/2018, chest CT from 8/23/2018 CELL TYPE:  non-small cell carcinoma w/ features s/o adenocarcinoma (T10 bone bx 10/18/16) TECHNIQUE:   8 5 mCi F-18-FDG administered IV  Multiplanar attenuation corrected and non attenuation corrected PET images are available for interpretation, and contiguous, low dose, axial CT sections were obtained from the skull base through the femurs   Intravenous contrast material was not utilized  This examination, like all CT scans performed in the [facility], was performed utilizing techniques to minimize radiation dose exposure, including the use of iterative reconstruction and automated exposure control   Fasting serum glucose: 165 mg/dl FINDINGS: VISUALIZED BRAIN:   No acute abnormalities are seen  HEAD/NECK:   There is a physiologic distribution of FDG  No FDG avid cervical adenopathy is seen  CT images: Scattered mucosal thickening is noted of the ethmoid air cells  CHEST:   Mild FDG uptake in the left upper lobe lesion, SUV max of 2 4, previously 4 2  This lesion measures 2 9 x 1 3 cm in size, image 83 series 3, stable  Scattered stable subcentimeter pulmonary nodules again noted, not FDG avid but likely too small to characterize by PET  Mild patchy opacity in the right upper lobe inferiorly, stable  CT images: Scattered coronary artery calcifications noted  Small hiatal hernia noted  ABDOMEN:   No FDG avid soft tissue lesions are seen  CT images: Prior cholecystectomy  Focal scarring in the right kidney midpole with associated cortical calcification  Scattered colonic diverticulosis  1 5 cm cystic lesion at the pancreatic body, unchanged  No focal FDG uptake here  PELVIS: No FDG avid soft tissue lesions are seen  CT images: Multiple surgical clips in the retroperitoneum and along the iliac chains  Small 11 mm ovoid hypodense focus at the left uterine bed may be calcified  OSSEOUS STRUCTURES: Enlarging FDG avid left scapular lesion, SUV max of 14 7, previously 8 3  This measures approximately 4 0 x 3 1 cm in size on CT image 77 series 3  There is erosion of the underlying scapular bone  This is not definitely seen on the prior CT  No focal FDG uptake at the right T10 costovertebral junction  CT images: Otherwise stable appearance  Impression: 1  Mild FDG uptake in the left upper lobe lesion, decreased from the prior examination  2  Further increased FDG uptake in the enlarging left scapular lesion compatible with progression  3  Stable small pancreatic cystic lesion  This is not FDG avid but this does not exclude the possibility of a cystic neoplasm  Recommend continued follow-up    Workstation performed: [id number]           ROS:  As mentioned in HPI & Interval History otherwise 14 point ROS negative  Allergies: Allergies   Allergen Reactions    Amoxicillin Rash    Cardizem [Diltiazem] Rash     Rash      Statins Myalgia     Severe muscle aching     Current Medications: Reviewed  PMH/FH/SH:  Reviewed      Physical Exam:    There is no height or weight on file to calculate BSA  Ht Readings from Last 3 Encounters:   11/07/18 5' 3 5" (1 613 m)   11/06/18 5' 3" (1 6 m)   11/05/18 5' 3" (1 6 m)        Wt Readings from Last 3 Encounters:   11/07/18 103 kg (227 lb 9 6 oz)   11/06/18 102 kg (224 lb 8 oz)   11/05/18 102 kg (225 lb 3 2 oz)        Temp Readings from Last 3 Encounters:   11/07/18 (!) 96 9 °F (36 1 °C) (Tympanic)   11/06/18 98 °F (36 7 °C) (Oral)   11/05/18 (!) 97 4 °F (36 3 °C)        BP Readings from Last 3 Encounters:   11/07/18 134/62   11/06/18 128/62   11/05/18 150/70           Physical Exam   Constitutional: She is oriented to person, place, and time  She appears well-developed and well-nourished  No distress  HENT:   Head: Normocephalic and atraumatic  Nose: Nose normal    Mouth/Throat: Oropharynx is clear and moist    Eyes: Pupils are equal, round, and reactive to light  Conjunctivae and EOM are normal  No scleral icterus  Neck: Normal range of motion  Neck supple  No tracheal deviation present  No thyromegaly present  Cardiovascular: Normal rate, regular rhythm, normal heart sounds and intact distal pulses  Exam reveals no gallop and no friction rub  No murmur heard  Pulmonary/Chest: Effort normal and breath sounds normal  No stridor  No respiratory distress  She has no wheezes  She has no rales  She exhibits no tenderness  Abdominal: Soft  Bowel sounds are normal  She exhibits no distension and no mass  There is no tenderness  There is no rebound and no guarding  Musculoskeletal: She exhibits no edema, tenderness or deformity  Tender left scapula   Lymphadenopathy:     She has no cervical adenopathy  Neurological: She is alert and oriented to person, place, and time  No cranial nerve deficit  Coordination normal    Skin: Skin is warm and dry  No rash noted  She is not diaphoretic  No erythema  No pallor  Psychiatric: She has a normal mood and affect  Her behavior is normal  Judgment and thought content normal        ECO    Goals and Barriers:  Current Goal: Prolong Survival from Cancer/ Have good QOL  Barriers: None  Patient's Capacity to Self Care:  Patient is able to self care      Emergency Contacts:    Dennys Johnson, ,

## 2018-11-21 PROCEDURE — 77387 GUIDANCE FOR RADJ TX DLVR: CPT | Performed by: RADIOLOGY

## 2018-11-21 PROCEDURE — 77412 RADIATION TX DELIVERY LVL 3: CPT | Performed by: RADIOLOGY

## 2018-11-23 ENCOUNTER — TELEPHONE (OUTPATIENT)
Dept: HEMATOLOGY ONCOLOGY | Facility: CLINIC | Age: 68
End: 2018-11-23

## 2018-11-23 PROCEDURE — 77412 RADIATION TX DELIVERY LVL 3: CPT | Performed by: RADIOLOGY

## 2018-11-23 PROCEDURE — 77387 GUIDANCE FOR RADJ TX DLVR: CPT | Performed by: RADIOLOGY

## 2018-11-23 NOTE — TELEPHONE ENCOUNTER
Please call Jim Hugo [phone] from the patient dentist's office to advise regarding clearance for dental fill replacement  Thanks

## 2018-11-26 PROCEDURE — 77412 RADIATION TX DELIVERY LVL 3: CPT | Performed by: RADIOLOGY

## 2018-11-26 PROCEDURE — 77387 GUIDANCE FOR RADJ TX DLVR: CPT | Performed by: RADIOLOGY

## 2018-11-26 RX ORDER — SODIUM CHLORIDE 9 MG/ML
20 INJECTION, SOLUTION INTRAVENOUS CONTINUOUS
Status: DISCONTINUED | OUTPATIENT
Start: 2018-11-27 | End: 2018-11-30 | Stop reason: HOSPADM

## 2018-11-26 RX ORDER — CYANOCOBALAMIN 1000 UG/ML
1000 INJECTION INTRAMUSCULAR; SUBCUTANEOUS ONCE
Status: COMPLETED | OUTPATIENT
Start: 2018-11-27 | End: 2018-11-27

## 2018-11-26 NOTE — TELEPHONE ENCOUNTER
Called Jonelle at the dental office and informed her that the patient has her next chemo 11/27/18 and 12/18/18  She will need to have the procedure about a week before her scheduled chemo in December

## 2018-11-26 NOTE — TELEPHONE ENCOUNTER
Dental filling ok  Advise to have done right before a chemo cycle to allow counts to recover  Please coordinate with dental office

## 2018-11-27 ENCOUNTER — OFFICE VISIT (OUTPATIENT)
Dept: FAMILY MEDICINE CLINIC | Facility: CLINIC | Age: 68
End: 2018-11-27
Payer: MEDICARE

## 2018-11-27 ENCOUNTER — TELEPHONE (OUTPATIENT)
Dept: HEMATOLOGY ONCOLOGY | Facility: CLINIC | Age: 68
End: 2018-11-27

## 2018-11-27 ENCOUNTER — HOSPITAL ENCOUNTER (OUTPATIENT)
Dept: INFUSION CENTER | Facility: CLINIC | Age: 68
Discharge: HOME | End: 2018-11-27
Payer: MEDICARE

## 2018-11-27 VITALS
RESPIRATION RATE: 18 BRPM | WEIGHT: 215.5 LBS | HEIGHT: 64 IN | DIASTOLIC BLOOD PRESSURE: 78 MMHG | BODY MASS INDEX: 36.79 KG/M2 | HEART RATE: 71 BPM | TEMPERATURE: 98.7 F | OXYGEN SATURATION: 99 % | SYSTOLIC BLOOD PRESSURE: 122 MMHG

## 2018-11-27 VITALS
WEIGHT: 216.2 LBS | TEMPERATURE: 97.3 F | RESPIRATION RATE: 18 BRPM | HEIGHT: 64 IN | OXYGEN SATURATION: 97 % | HEART RATE: 50 BPM | DIASTOLIC BLOOD PRESSURE: 64 MMHG | SYSTOLIC BLOOD PRESSURE: 134 MMHG | BODY MASS INDEX: 36.91 KG/M2

## 2018-11-27 DIAGNOSIS — Z00.00 MEDICARE ANNUAL WELLNESS VISIT, SUBSEQUENT: ICD-10-CM

## 2018-11-27 DIAGNOSIS — E11.9 TYPE 2 DIABETES MELLITUS WITHOUT COMPLICATION, WITHOUT LONG-TERM CURRENT USE OF INSULIN (HCC): ICD-10-CM

## 2018-11-27 DIAGNOSIS — R21 FACIAL RASH: Primary | ICD-10-CM

## 2018-11-27 DIAGNOSIS — C34.90 MALIGNANT NEOPLASM OF LUNG, UNSPECIFIED LATERALITY, UNSPECIFIED PART OF LUNG (HCC): Primary | ICD-10-CM

## 2018-11-27 DIAGNOSIS — E78.2 MIXED HYPERLIPIDEMIA: ICD-10-CM

## 2018-11-27 LAB
ALBUMIN SERPL BCP-MCNC: 2.8 G/DL (ref 3.5–5)
ALP SERPL-CCNC: 89 U/L (ref 46–116)
ALT SERPL W P-5'-P-CCNC: 25 U/L (ref 12–78)
ANION GAP SERPL CALCULATED.3IONS-SCNC: 11 MMOL/L (ref 4–13)
AST SERPL W P-5'-P-CCNC: 14 U/L (ref 5–45)
BASOPHILS # BLD AUTO: 0.01 THOUSANDS/ΜL (ref 0–0.1)
BASOPHILS NFR BLD AUTO: 0 % (ref 0–1)
BILIRUB SERPL-MCNC: 0.4 MG/DL (ref 0.2–1)
BUN SERPL-MCNC: 17 MG/DL (ref 5–25)
CALCIUM SERPL-MCNC: 9.3 MG/DL (ref 8.3–10.1)
CHLORIDE SERPL-SCNC: 102 MMOL/L (ref 100–108)
CO2 SERPL-SCNC: 24 MMOL/L (ref 21–32)
CREAT SERPL-MCNC: 0.96 MG/DL (ref 0.6–1.3)
EOSINOPHIL # BLD AUTO: 0 THOUSAND/ΜL (ref 0–0.61)
EOSINOPHIL NFR BLD AUTO: 0 % (ref 0–6)
ERYTHROCYTE [DISTWIDTH] IN BLOOD BY AUTOMATED COUNT: 15 % (ref 11.6–15.1)
GFR SERPL CREATININE-BSD FRML MDRD: 60 ML/MIN/1.73SQ M
GLUCOSE SERPL-MCNC: 241 MG/DL (ref 65–140)
HCT VFR BLD AUTO: 33.5 % (ref 34.8–46.1)
HGB BLD-MCNC: 10.7 G/DL (ref 11.5–15.4)
IMM GRANULOCYTES # BLD AUTO: 0.05 THOUSAND/UL (ref 0–0.2)
IMM GRANULOCYTES NFR BLD AUTO: 1 % (ref 0–2)
LYMPHOCYTES # BLD AUTO: 0.48 THOUSANDS/ΜL (ref 0.6–4.47)
LYMPHOCYTES NFR BLD AUTO: 8 % (ref 14–44)
MCH RBC QN AUTO: 25.7 PG (ref 26.8–34.3)
MCHC RBC AUTO-ENTMCNC: 31.9 G/DL (ref 31.4–37.4)
MCV RBC AUTO: 80 FL (ref 82–98)
MONOCYTES # BLD AUTO: 0.59 THOUSAND/ΜL (ref 0.17–1.22)
MONOCYTES NFR BLD AUTO: 9 % (ref 4–12)
NEUTROPHILS # BLD AUTO: 5.18 THOUSANDS/ΜL (ref 1.85–7.62)
NEUTS SEG NFR BLD AUTO: 82 % (ref 43–75)
NRBC BLD AUTO-RTO: 0 /100 WBCS
PLATELET # BLD AUTO: 310 THOUSANDS/UL (ref 149–390)
PMV BLD AUTO: 9.7 FL (ref 8.9–12.7)
POTASSIUM SERPL-SCNC: 3.7 MMOL/L (ref 3.5–5.3)
PROT SERPL-MCNC: 6.7 G/DL (ref 6.4–8.2)
RBC # BLD AUTO: 4.17 MILLION/UL (ref 3.81–5.12)
SODIUM SERPL-SCNC: 137 MMOL/L (ref 136–145)
WBC # BLD AUTO: 6.31 THOUSAND/UL (ref 4.31–10.16)

## 2018-11-27 PROCEDURE — 99213 OFFICE O/P EST LOW 20 MIN: CPT | Performed by: FAMILY MEDICINE

## 2018-11-27 PROCEDURE — 96411 CHEMO IV PUSH ADDL DRUG: CPT

## 2018-11-27 PROCEDURE — 85025 COMPLETE CBC W/AUTO DIFF WBC: CPT | Performed by: INTERNAL MEDICINE

## 2018-11-27 PROCEDURE — 80053 COMPREHEN METABOLIC PANEL: CPT | Performed by: INTERNAL MEDICINE

## 2018-11-27 PROCEDURE — G0439 PPPS, SUBSEQ VISIT: HCPCS | Performed by: FAMILY MEDICINE

## 2018-11-27 PROCEDURE — 96372 THER/PROPH/DIAG INJ SC/IM: CPT

## 2018-11-27 PROCEDURE — 96413 CHEMO IV INFUSION 1 HR: CPT

## 2018-11-27 PROCEDURE — 96367 TX/PROPH/DG ADDL SEQ IV INF: CPT

## 2018-11-27 RX ORDER — CLINDAMYCIN HYDROCHLORIDE 150 MG/1
CAPSULE ORAL
COMMUNITY
Start: 2018-11-26 | End: 2019-01-11

## 2018-11-27 RX ADMIN — SODIUM CHLORIDE 1000 MG: 9 INJECTION, SOLUTION INTRAVENOUS at 10:16

## 2018-11-27 RX ADMIN — CYANOCOBALAMIN 1000 MCG: 1000 INJECTION, SOLUTION INTRAMUSCULAR at 10:30

## 2018-11-27 RX ADMIN — SODIUM CHLORIDE 20 ML/HR: 0.9 INJECTION, SOLUTION INTRAVENOUS at 08:28

## 2018-11-27 RX ADMIN — ONDANSETRON 16 MG: 2 INJECTION INTRAMUSCULAR; INTRAVENOUS at 09:25

## 2018-11-27 RX ADMIN — CARBOPLATIN 566 MG: 10 INJECTION, SOLUTION INTRAVENOUS at 10:40

## 2018-11-27 NOTE — PATIENT INSTRUCTIONS
Obesity   AMBULATORY CARE:   Obesity  is when your body mass index (BMI) is greater than 30  Your healthcare provider will use your height and weight to measure your BMI  The risks of obesity include  many health problems, such as injuries or physical disability  You may need tests to check for the following:  · Diabetes     · High blood pressure or high cholesterol     · Heart disease     · Gallbladder or liver disease     · Cancer of the colon, breast, prostate, liver, or kidney     · Sleep apnea     · Arthritis or gout  Seek care immediately if:   · You have a severe headache, confusion, or difficulty speaking  · You have weakness on one side of your body  · You have chest pain, sweating, or shortness of breath  Contact your healthcare provider if:   · You have symptoms of gallbladder or liver disease, such as pain in your upper abdomen  · You have knee or hip pain and discomfort while walking  · You have symptoms of diabetes, such as intense hunger and thirst, and frequent urination  · You have symptoms of sleep apnea, such as snoring or daytime sleepiness  · You have questions or concerns about your condition or care  Treatment for obesity  focuses on helping you lose weight to improve your health  Even a small decrease in BMI can reduce the risk for many health problems  Your healthcare provider will help you set a weight-loss goal   · Lifestyle changes  are the first step in treating obesity  These include making healthy food choices and getting regular physical activity  Your healthcare provider may suggest a weight-loss program that involves coaching, education, and therapy  · Medicine  may help you lose weight when it is used with a healthy diet and physical activity  · Surgery  can help you lose weight if you are very obese and have other health problems  There are several types of weight-loss surgery  Ask your healthcare provider for more information    Be successful losing weight:   · Set small, realistic goals  An example of a small goal is to walk for 20 minutes 5 days a week  Anther goal is to lose 5% of your body weight  · Tell friends, family members, and coworkers about your goals  and ask for their support  Ask a friend to lose weight with you, or join a weight-loss support group  · Identify foods or triggers that may cause you to overeat , and find ways to avoid them  Remove tempting high-calorie foods from your home and workplace  Place a bowl of fresh fruit on your kitchen counter  If stress causes you to eat, then find other ways to cope with stress  · Keep a diary to track what you eat and drink  Also write down how many minutes of physical activity you do each day  Weigh yourself once a week and record it in your diary  Eating changes: You will need to eat 500 to 1,000 fewer calories each day than you currently eat to lose 1 to 2 pounds a week  The following changes will help you cut calories:  · Eat smaller portions  Use small plates, no larger than 9 inches in diameter  Fill your plate half full of fruits and vegetables  Measure your food using measuring cups until you know what a serving size looks like  · Eat 3 meals and 1 or 2 snacks each day  Plan your meals in advance  Ej Dobbs and eat at home most of the time  Eat slowly  · Eat fruits and vegetables at every meal   They are low in calories and high in fiber, which makes you feel full  Do not add butter, margarine, or cream sauce to vegetables  Use herbs to season steamed vegetables  · Eat less fat and fewer fried foods  Eat more baked or grilled chicken and fish  These protein sources are lower in calories and fat than red meat  Limit fast food  Dress your salads with olive oil and vinegar instead of bottled dressing  · Limit the amount of sugar you eat  Do not drink sugary beverages  Limit alcohol  Activity changes:  Physical activity is good for your body in many ways   It helps you burn calories and build strong muscles  It decreases stress and depression, and improves your mood  It can also help you sleep better  Talk to your healthcare provider before you begin an exercise program   · Exercise for at least 30 minutes 5 days a week  Start slowly  Set aside time each day for physical activity that you enjoy and that is convenient for you  It is best to do both weight training and an activity that increases your heart rate, such as walking, bicycling, or swimming  · Find ways to be more active  Do yard work and housecleaning  Walk up the stairs instead of using elevators  Spend your leisure time going to events that require walking, such as outdoor festivals or fairs  This extra physical activity can help you lose weight and keep it off  Follow up with your healthcare provider as directed: You may need to meet with a dietitian  Write down your questions so you remember to ask them during your visits  © 2017 [employer] Information is for End User's use only and may not be sold, redistributed or otherwise used for commercial purposes  All illustrations and images included in CareNotes® are the copyrighted property of [employer]  or [employer]  The above information is an  only  It is not intended as medical advice for individual conditions or treatments  Talk to your doctor, nurse or pharmacist before following any medical regimen to see if it is safe and effective for you  Urinary Incontinence   WHAT YOU NEED TO KNOW:   What is urinary incontinence? Urinary incontinence (UI) is when you lose control of your bladder  What causes UI? UI occurs because your bladder cannot store or empty urine properly  The following are the most common types of UI:  · Stress incontinence  is when you leak urine due to increased bladder pressure  This may happen when you cough, sneeze, or exercise       · Urge incontinence  is when you feel the need to urinate right away and leak urine accidentally  · Mixed incontinence  is when you have both stress and urge UI  What are the signs and symptoms of UI?   · You feel like your bladder does not empty completely when you urinate  · You urinate often and need to urinate immediately  · You leak urine when you sleep, or you wake up with the urge to urinate  · You leak urine when you cough, sneeze, exercise, or laugh  How is UI diagnosed? Your healthcare provider will ask how often you leak urine and whether you have stress or urge symptoms  Tell him which medicines you take, how often you urinate, and how much liquid you drink each day  You may need any of the following tests:  · Urine tests  may show infection or kidney function  · A pelvic exam  may be done to check for blockages  A pelvic exam will also show if your bladder, uterus, or other organs have moved out of place  · An x-ray, ultrasound, or CT  may show problems with parts of your urinary system  You may be given contrast liquid to help your organs show up better in the pictures  Tell the healthcare provider if you have ever had an allergic reaction to contrast liquid  Do not enter the MRI room with anything metal  Metal can cause serious injury  Tell the healthcare provider if you have any metal in or on your body  · A bladder scan  will show how much urine is left in your bladder after you urinate  You will be asked to urinate and then healthcare providers will use a small ultrasound machine to check the urine left in your bladder  · Cystometry  is used to check the function of your urinary system  Your healthcare provider checks the pressure in your bladder while filling it with fluid  Your bladder pressure may also be tested when your bladder is full and while you urinate  How is UI treated? · Medicines  can help strengthen your bladder control      · Electrical stimulation  is used to send a small amount of electrical energy to your pelvic floor muscles  This helps control your bladder function  Electrodes may be placed outside your body or in your rectum  For women, the electrodes may be placed in the vagina  · A bulking agent  may be injected into the wall of your urethra to make it thicker  This helps keep your urethra closed and decreases urine leakage  · Devices  such as a clamp, pessary, or tampon may help stop urine leaks  Ask your healthcare provider for more information about these and other devices  · Surgery  may be needed if other treatments do not work  Several types of surgery can help improve your bladder control  Ask your healthcare provider for more information about the surgery you may need  How can I manage my symptoms? · Do pelvic muscle exercises often  Your pelvic muscles help you stop urinating  Squeeze these muscles tight for 5 seconds, then relax for 5 seconds  Gradually work up to squeezing for 10 seconds  Do 3 sets of 15 repetitions a day, or as directed  This will help strengthen your pelvic muscles and improve bladder control  · A catheter  may be used to help empty your bladder  A catheter is a tiny, plastic tube that is put into your bladder to drain your urine  Your healthcare provider may tell you to use a catheter to prevent your bladder from getting too full and leaking urine  · Keep a UI record  Write down how often you leak urine and how much you leak  Make a note of what you were doing when you leaked urine  · Train your bladder  Go to the bathroom at set times, such as every 2 hours, even if you do not feel the urge to go  You can also try to hold your urine when you feel the urge to go  For example, hold your urine for 5 minutes when you feel the urge to go  As that becomes easier, hold your urine for 10 minutes  · Drink liquids as directed  Ask your healthcare provider how much liquid to drink each day and which liquids are best for you   You may need to limit the amount of liquid you drink to help control your urine leakage  Limit or do not have drinks that contain caffeine or alcohol  Do not drink any liquid right before you go to bed  · Prevent constipation  Eat a variety of high-fiber foods  Good examples are high-fiber cereals, beans, vegetables, and whole-grain breads  Prune juice may help make your bowel movement softer  Walking is the best way to trigger your intestines to have a bowel movement  · Exercise regularly and maintain a healthy weight  Ask your healthcare provider how much you should weigh and about the best exercise plan for you  Weight loss and exercise will decrease pressure on your bladder and help you control your leakage  Ask him to help you create a weight loss plan if you are overweight  When should I seek immediate care? · You have severe pain  · You are confused or cannot think clearly  When should I contact my healthcare provider? · You have a fever  · You see blood in your urine  · You have pain when you urinate  · You have new or worse pain, even after treatment  · Your mouth feels dry or you have vision changes  · Your urine is cloudy or smells bad  · You have questions or concerns about your condition or care  CARE AGREEMENT:   You have the right to help plan your care  Learn about your health condition and how it may be treated  Discuss treatment options with your caregivers to decide what care you want to receive  You always have the right to refuse treatment  The above information is an  only  It is not intended as medical advice for individual conditions or treatments  Talk to your doctor, nurse or pharmacist before following any medical regimen to see if it is safe and effective for you  © 2017 [employer] [employer] Information is for End User's use only and may not be sold, redistributed or otherwise used for commercial purposes   All illustrations and images included in CareNotes® are the copyrighted property of [employer]  or [employer]  Cigarette Smoking and Your Health   AMBULATORY CARE:   Risks to your health if you smoke:  Nicotine and other chemicals found in tobacco damage every cell in your body  Even if you are a light smoker, you have an increased risk for cancer, heart disease, and lung disease  If you are pregnant or have diabetes, smoking increases your risk for complications  Benefits to your health if you stop smoking:   · You decrease respiratory symptoms such as coughing, wheezing, and shortness of breath  · You reduce your risk for cancers of the lung, mouth, throat, kidney, bladder, pancreas, stomach, and cervix  If you already have cancer, you increase the benefits of chemotherapy  You also reduce your risk for cancer returning or a second cancer from developing  · You reduce your risk for heart disease, blood clots, heart attack, and stroke  · You reduce your risk for lung infections, and diseases such as pneumonia, asthma, chronic bronchitis, and emphysema  · Your circulation improves  More oxygen can be delivered to your body  If you have diabetes, you lower your risk for complications, such as kidney, artery, and eye diseases  You also lower your risk for nerve damage  Nerve damage can lead to amputations, poor vision, and blindness  · You improve your body's ability to heal and to fight infections  Benefits to the health of others if you stop smoking:  Tobacco is harmful to nonsmokers who breathe in your secondhand smoke  The following are ways the health of others around you may improve when you stop smoking:  · You lower the risks for lung cancer and heart disease in nonsmoking adults  · If you are pregnant, you lower the risk for miscarriage, early delivery, low birth weight, and stillbirth  You also lower your baby's risk for SIDS, obesity, developmental delay, and neurobehavioral problems, such as ADHD  · If you have children, you lower their risk for ear infections, colds, pneumonia, bronchitis, and asthma  For more information and support to stop smoking:   · Smokefree  gov  Phone: 3- 296 - 251-5126  Web Address: www smokefree  gov  Follow up with your healthcare provider as directed:  Write down your questions so you remember to ask them during your visits  © 2017 [address] [employer] Information is for End User's use only and may not be sold, redistributed or otherwise used for commercial purposes  All illustrations and images included in CareNotes® are the copyrighted property of [employer]  or [employer]  The above information is an  only  It is not intended as medical advice for individual conditions or treatments  Talk to your doctor, nurse or pharmacist before following any medical regimen to see if it is safe and effective for you  Fall Prevention   AMBULATORY CARE:   Fall prevention  includes ways to make your home and other areas safer  It also includes ways you can move more carefully to prevent a fall  Health conditions that cause changes in your blood pressure, vision, or muscle strength and coordination may increase your risk for falls  Medicines may also increase your risk for falls if they make you dizzy, weak, or sleepy  Call 911 or have someone else call if:   · You have fallen and are unconscious  · You have fallen and cannot move part of your body  Contact your healthcare provider if:   · You have fallen and have pain or a headache  · You have questions or concerns about your condition or care  Fall prevention tips:   · Stand or sit up slowly  This may help you keep your balance and prevent falls  · Use assistive devices as directed  Your healthcare provider may suggest that you use a cane or walker to help you keep your balance  You may need to have grab bars put in your bathroom near the toilet or in the shower      · Wear shoes that fit well and have soles that   Wear shoes both inside and outside  Use slippers with good   Do not wear shoes with high heels  · Wear a personal alarm  This is a device that allows you to call 911 if you fall and need help  Ask your healthcare provider for more information  · Stay active  Exercise can help strengthen your muscles and improve your balance  Your healthcare provider may recommend water aerobics or walking  He or she may also recommend physical therapy to improve your coordination  Never start an exercise program without talking to your healthcare provider first      · Manage your medical conditions  Keep all appointments with your healthcare providers  Visit your eye doctor as directed  Home safety tips:   · Add items to prevent falls in the bathroom  Put nonslip strips on your bath or shower floor to prevent you from slipping  Use a bath mat if you do not have carpet in the bathroom  This will prevent you from falling when you step out of the bath or shower  Use a shower seat so you do not need to stand while you shower  Sit on the toilet or a chair in your bathroom to dry yourself and put on clothing  This will prevent you from losing your balance from drying or dressing yourself while you are standing  · Keep paths clear  Remove books, shoes, and other objects from walkways and stairs  Place cords for telephones and lamps out of the way so that you do not need to walk over them  Tape them down if you cannot move them  Remove small rugs  If you cannot remove a rug, secure it with double-sided tape  This will prevent you from tripping  · Install bright lights in your home  Use night lights to help light paths to the bathroom or kitchen  Always turn on the light before you start walking  · Keep items you use often on shelves within reach  Do not use a step stool to help you reach an item  · Paint or place reflective tape on the edges of your stairs    This will help you see the stairs better  Follow up with your healthcare provider as directed:  Write down your questions so you remember to ask them during your visits  © 2017 [address] [employer] Information is for End User's use only and may not be sold, redistributed or otherwise used for commercial purposes  All illustrations and images included in CareNotes® are the copyrighted property of [employer]  or [employer]  The above information is an  only  It is not intended as medical advice for individual conditions or treatments  Talk to your doctor, nurse or pharmacist before following any medical regimen to see if it is safe and effective for you  Advance Directives   WHAT YOU NEED TO KNOW:   What are advance directives? Advance directives are legal documents that state your wishes and plans for medical care  These plans are made ahead of time in case you lose your ability to make decisions for yourself  Advance directives can apply to any medical decision, such as the treatments you want, and if you want to donate organs  What are the types of advance directives? There are many types of advance directives, and each state has rules about how to use them  You may choose a combination of any of the following:  · Living will: This is a written record of the treatment you want  You can also choose which treatments you do not want, which to limit, and which to stop at a certain time  This includes surgery, medicine, IV fluid, and tube feedings  · Durable power of  for healthcare [facility]): This is a written record that states who you want to make healthcare choices for you when you are unable to make them for yourself  This person, called a proxy, is usually a family member or a friend  You may choose more than 1 proxy  · Do not resuscitate (DNR) order:  A DNR order is used in case your heart stops beating or you stop breathing   It is a request not to have certain forms of treatment, such as CPR  A DNR order may be included in other types of advance directives  · Medical directive: This covers the care that you want if you are in a coma, near death, or unable to make decisions for yourself  You can list the treatments you want for each condition  Treatment may include pain medicine, surgery, blood transfusions, dialysis, IV or tube feedings, and a ventilator (breathing machine)  · Values history: This document has questions about your views, beliefs, and how you feel and think about life  This information can help others choose the care that you would choose  Why are advance directives important? An advance directive helps you control your care  Although spoken wishes may be used, it is better to have your wishes written down  Spoken wishes can be misunderstood, or not followed  Treatments may be given even if you do not want them  An advance directive may make it easier for your family to make difficult choices about your care  How do I decide what to put in my advance directives? · Make informed decisions:  Make sure you fully understand treatments or care you may receive  Think about the benefits and problems your decisions could cause for you or your family  Talk to healthcare providers if you have concerns or questions before you write down your wishes  You may also want to talk with your Holiness or , or a   Check your state laws to make sure that what you put in your advance directive is legal      · Sign all forms:  Sign and date your advance directive when you have finished  You may also need 2 witnesses to sign the forms  Witnesses cannot be your doctor or his staff, your spouse, heirs or beneficiaries, people you owe money to, or your chosen proxy  Talk to your family, proxy, and healthcare providers about your advance directive  Give each person a copy, and keep one for yourself in a place you can get to easily   Do not keep it hidden or locked away  · Review and revise your plans: You can revise your advance directive at any time, as long as you are able to make decisions  Review your plan every year, and when there are changes in your life, or your health  When you make changes, let your family, proxy, and healthcare providers know  Give each a new copy  Where can I find more information? · [employer]  [address] , [place] [address]  Phone: 9- 655 - 067-7074  Phone: 1- 013 - [phone]  Web Address: http://www  aafp org  · [address] [facility])  [address] [address] [facility], [address]  Phone: 9- [phone] - [phone]  Phone: [phone]  Web Address: Sherita sagastume  CARE AGREEMENT:   You have the right to help plan your care  To help with this plan, you must learn about your health condition and treatment options  You must also learn about advance directives and how they are used  Work with your healthcare providers to decide what care will be used to treat you  You always have the right to refuse treatment  The above information is an  only  It is not intended as medical advice for individual conditions or treatments  Talk to your doctor, nurse or pharmacist before following any medical regimen to see if it is safe and effective for you  © 2017 [address] [employer] Information is for End User's use only and may not be sold, redistributed or otherwise used for commercial purposes  All illustrations and images included in CareNotes® are the copyrighted property of [employer]  or [employer]

## 2018-11-27 NOTE — ASSESSMENT & PLAN NOTE
Lab Results   Component Value Date    HGBA1C 7 6 (H) 09/10/2018       No results for input(s): POCGLU in the last 72 hours      Blood Sugar Average: Last 72 hrs:  Check labs in march

## 2018-11-27 NOTE — PROGRESS NOTES
Assessment/Plan:    Problem List Items Addressed This Visit     Facial rash - Primary     Unlikely due to ring worm  To see derm         Diabetes mellitus type 2, uncomplicated ([facility] [address] )     Lab Results   Component Value Date    HGBA1C 7 6 (H) 09/10/2018       No results for input(s): POCGLU in the last 72 hours  Blood Sugar Average: Last 72 hrs:  Check labs in march           Relevant Orders    Comprehensive metabolic panel    Hemoglobin A1C    Microalbumin / creatinine urine ratio    TSH, 3rd generation with Free T4 reflex    Hyperlipidemia    Relevant Orders    Lipid Panel with Direct LDL reflex    TSH, 3rd generation with Free T4 reflex    Medicare annual wellness visit, subsequent     Up to date  Holding off on colonoscopy since getting cancer treatment               Patient Instructions       Obesity   AMBULATORY CARE:   Obesity  is when your body mass index (BMI) is greater than 30  Your healthcare provider will use your height and weight to measure your BMI  The risks of obesity include  many health problems, such as injuries or physical disability  You may need tests to check for the following:  · Diabetes     · High blood pressure or high cholesterol     · Heart disease     · Gallbladder or liver disease     · Cancer of the colon, breast, prostate, liver, or kidney     · Sleep apnea     · Arthritis or gout  Seek care immediately if:   · You have a severe headache, confusion, or difficulty speaking  · You have weakness on one side of your body  · You have chest pain, sweating, or shortness of breath  Contact your healthcare provider if:   · You have symptoms of gallbladder or liver disease, such as pain in your upper abdomen  · You have knee or hip pain and discomfort while walking  · You have symptoms of diabetes, such as intense hunger and thirst, and frequent urination  · You have symptoms of sleep apnea, such as snoring or daytime sleepiness       · You have questions or concerns about your condition or care  Treatment for obesity  focuses on helping you lose weight to improve your health  Even a small decrease in BMI can reduce the risk for many health problems  Your healthcare provider will help you set a weight-loss goal   · Lifestyle changes  are the first step in treating obesity  These include making healthy food choices and getting regular physical activity  Your healthcare provider may suggest a weight-loss program that involves coaching, education, and therapy  · Medicine  may help you lose weight when it is used with a healthy diet and physical activity  · Surgery  can help you lose weight if you are very obese and have other health problems  There are several types of weight-loss surgery  Ask your healthcare provider for more information  Be successful losing weight:   · Set small, realistic goals  An example of a small goal is to walk for 20 minutes 5 days a week  Anther goal is to lose 5% of your body weight  · Tell friends, family members, and coworkers about your goals  and ask for their support  Ask a friend to lose weight with you, or join a weight-loss support group  · Identify foods or triggers that may cause you to overeat , and find ways to avoid them  Remove tempting high-calorie foods from your home and workplace  Place a bowl of fresh fruit on your kitchen counter  If stress causes you to eat, then find other ways to cope with stress  · Keep a diary to track what you eat and drink  Also write down how many minutes of physical activity you do each day  Weigh yourself once a week and record it in your diary  Eating changes: You will need to eat 500 to 1,000 fewer calories each day than you currently eat to lose 1 to 2 pounds a week  The following changes will help you cut calories:  · Eat smaller portions  Use small plates, no larger than 9 inches in diameter  Fill your plate half full of fruits and vegetables   Measure your food using measuring cups until you know what a serving size looks like  · Eat 3 meals and 1 or 2 snacks each day  Plan your meals in advance  Lynne Ceron and eat at home most of the time  Eat slowly  · Eat fruits and vegetables at every meal   They are low in calories and high in fiber, which makes you feel full  Do not add butter, margarine, or cream sauce to vegetables  Use herbs to season steamed vegetables  · Eat less fat and fewer fried foods  Eat more baked or grilled chicken and fish  These protein sources are lower in calories and fat than red meat  Limit fast food  Dress your salads with olive oil and vinegar instead of bottled dressing  · Limit the amount of sugar you eat  Do not drink sugary beverages  Limit alcohol  Activity changes:  Physical activity is good for your body in many ways  It helps you burn calories and build strong muscles  It decreases stress and depression, and improves your mood  It can also help you sleep better  Talk to your healthcare provider before you begin an exercise program   · Exercise for at least 30 minutes 5 days a week  Start slowly  Set aside time each day for physical activity that you enjoy and that is convenient for you  It is best to do both weight training and an activity that increases your heart rate, such as walking, bicycling, or swimming  · Find ways to be more active  Do yard work and housecleaning  Walk up the stairs instead of using elevators  Spend your leisure time going to events that require walking, such as outdoor festivals or fairs  This extra physical activity can help you lose weight and keep it off  Follow up with your healthcare provider as directed: You may need to meet with a dietitian  Write down your questions so you remember to ask them during your visits  © 2017 [address] Information is for End User's use only and may not be sold, redistributed or otherwise used for commercial purposes   All illustrations and images included in [place] are the copyrighted property of A D A M , Inc  or Greyson Anaya  The above information is an  only  It is not intended as medical advice for individual conditions or treatments  Talk to your doctor, nurse or pharmacist before following any medical regimen to see if it is safe and effective for you  Urinary Incontinence   WHAT YOU NEED TO KNOW:   What is urinary incontinence? Urinary incontinence (UI) is when you lose control of your bladder  What causes UI? UI occurs because your bladder cannot store or empty urine properly  The following are the most common types of UI:  · Stress incontinence  is when you leak urine due to increased bladder pressure  This may happen when you cough, sneeze, or exercise  · Urge incontinence  is when you feel the need to urinate right away and leak urine accidentally  · Mixed incontinence  is when you have both stress and urge UI  What are the signs and symptoms of UI?   · You feel like your bladder does not empty completely when you urinate  · You urinate often and need to urinate immediately  · You leak urine when you sleep, or you wake up with the urge to urinate  · You leak urine when you cough, sneeze, exercise, or laugh  How is UI diagnosed? Your healthcare provider will ask how often you leak urine and whether you have stress or urge symptoms  Tell him which medicines you take, how often you urinate, and how much liquid you drink each day  You may need any of the following tests:  · Urine tests  may show infection or kidney function  · A pelvic exam  may be done to check for blockages  A pelvic exam will also show if your bladder, uterus, or other organs have moved out of place  · An x-ray, ultrasound, or CT  may show problems with parts of your urinary system  You may be given contrast liquid to help your organs show up better in the pictures   Tell the healthcare provider if you have ever had an allergic reaction to contrast liquid  Do not enter the MRI room with anything metal  Metal can cause serious injury  Tell the healthcare provider if you have any metal in or on your body  · A bladder scan  will show how much urine is left in your bladder after you urinate  You will be asked to urinate and then healthcare providers will use a small ultrasound machine to check the urine left in your bladder  · Cystometry  is used to check the function of your urinary system  Your healthcare provider checks the pressure in your bladder while filling it with fluid  Your bladder pressure may also be tested when your bladder is full and while you urinate  How is UI treated? · Medicines  can help strengthen your bladder control  · Electrical stimulation  is used to send a small amount of electrical energy to your pelvic floor muscles  This helps control your bladder function  Electrodes may be placed outside your body or in your rectum  For women, the electrodes may be placed in the vagina  · A bulking agent  may be injected into the wall of your urethra to make it thicker  This helps keep your urethra closed and decreases urine leakage  · Devices  such as a clamp, pessary, or tampon may help stop urine leaks  Ask your healthcare provider for more information about these and other devices  · Surgery  may be needed if other treatments do not work  Several types of surgery can help improve your bladder control  Ask your healthcare provider for more information about the surgery you may need  How can I manage my symptoms? · Do pelvic muscle exercises often  Your pelvic muscles help you stop urinating  Squeeze these muscles tight for 5 seconds, then relax for 5 seconds  Gradually work up to squeezing for 10 seconds  Do 3 sets of 15 repetitions a day, or as directed  This will help strengthen your pelvic muscles and improve bladder control  · A catheter  may be used to help empty your bladder   A catheter is a tiny, plastic tube that is put into your bladder to drain your urine  Your healthcare provider may tell you to use a catheter to prevent your bladder from getting too full and leaking urine  · Keep a UI record  Write down how often you leak urine and how much you leak  Make a note of what you were doing when you leaked urine  · Train your bladder  Go to the bathroom at set times, such as every 2 hours, even if you do not feel the urge to go  You can also try to hold your urine when you feel the urge to go  For example, hold your urine for 5 minutes when you feel the urge to go  As that becomes easier, hold your urine for 10 minutes  · Drink liquids as directed  Ask your healthcare provider how much liquid to drink each day and which liquids are best for you  You may need to limit the amount of liquid you drink to help control your urine leakage  Limit or do not have drinks that contain caffeine or alcohol  Do not drink any liquid right before you go to bed  · Prevent constipation  Eat a variety of high-fiber foods  Good examples are high-fiber cereals, beans, vegetables, and whole-grain breads  Prune juice may help make your bowel movement softer  Walking is the best way to trigger your intestines to have a bowel movement  · Exercise regularly and maintain a healthy weight  Ask your healthcare provider how much you should weigh and about the best exercise plan for you  Weight loss and exercise will decrease pressure on your bladder and help you control your leakage  Ask him to help you create a weight loss plan if you are overweight  When should I seek immediate care? · You have severe pain  · You are confused or cannot think clearly  When should I contact my healthcare provider? · You have a fever  · You see blood in your urine  · You have pain when you urinate  · You have new or worse pain, even after treatment      · Your mouth feels dry or you have vision changes  · Your urine is cloudy or smells bad  · You have questions or concerns about your condition or care  CARE AGREEMENT:   You have the right to help plan your care  Learn about your health condition and how it may be treated  Discuss treatment options with your caregivers to decide what care you want to receive  You always have the right to refuse treatment  The above information is an  only  It is not intended as medical advice for individual conditions or treatments  Talk to your doctor, nurse or pharmacist before following any medical regimen to see if it is safe and effective for you  © 2017 [address]  Information is for End User's use only and may not be sold, redistributed or otherwise used for commercial purposes  All illustrations and images included in CareNotes® are the copyrighted property of [employer] D [employer]  or Greyson Anaya  Cigarette Smoking and Your Health   AMBULATORY CARE:   Risks to your health if you smoke:  Nicotine and other chemicals found in tobacco damage every cell in your body  Even if you are a light smoker, you have an increased risk for cancer, heart disease, and lung disease  If you are pregnant or have diabetes, smoking increases your risk for complications  Benefits to your health if you stop smoking:   · You decrease respiratory symptoms such as coughing, wheezing, and shortness of breath  · You reduce your risk for cancers of the lung, mouth, throat, kidney, bladder, pancreas, stomach, and cervix  If you already have cancer, you increase the benefits of chemotherapy  You also reduce your risk for cancer returning or a second cancer from developing  · You reduce your risk for heart disease, blood clots, heart attack, and stroke  · You reduce your risk for lung infections, and diseases such as pneumonia, asthma, chronic bronchitis, and emphysema  · Your circulation improves   More oxygen can be delivered to your body  If you have diabetes, you lower your risk for complications, such as kidney, artery, and eye diseases  You also lower your risk for nerve damage  Nerve damage can lead to amputations, poor vision, and blindness  · You improve your body's ability to heal and to fight infections  Benefits to the health of others if you stop smoking:  Tobacco is harmful to nonsmokers who breathe in your secondhand smoke  The following are ways the health of others around you may improve when you stop smoking:  · You lower the risks for lung cancer and heart disease in nonsmoking adults  · If you are pregnant, you lower the risk for miscarriage, early delivery, low birth weight, and stillbirth  You also lower your baby's risk for SIDS, obesity, developmental delay, and neurobehavioral problems, such as ADHD  · If you have children, you lower their risk for ear infections, colds, pneumonia, bronchitis, and asthma  For more information and support to stop smoking:   · [employer]  gov  Phone: 1- 794 - 132-4983  Web Address: www [employer]  Follow up with your healthcare provider as directed:  Write down your questions so you remember to ask them during your visits  © 2017 [address] Information is for End User's use only and may not be sold, redistributed or otherwise used for commercial purposes  All illustrations and images included in CareNotes® are the copyrighted property of A D A [employer]  or Greyson Anaya  The above information is an  only  It is not intended as medical advice for individual conditions or treatments  Talk to your doctor, nurse or pharmacist before following any medical regimen to see if it is safe and effective for you  Fall Prevention   AMBULATORY CARE:   Fall prevention  includes ways to make your home and other areas safer  It also includes ways you can move more carefully to prevent a fall   Health conditions that cause changes in your blood pressure, vision, or muscle strength and coordination may increase your risk for falls  Medicines may also increase your risk for falls if they make you dizzy, weak, or sleepy  Call 911 or have someone else call if:   · You have fallen and are unconscious  · You have fallen and cannot move part of your body  Contact your healthcare provider if:   · You have fallen and have pain or a headache  · You have questions or concerns about your condition or care  Fall prevention tips:   · Stand or sit up slowly  This may help you keep your balance and prevent falls  · Use assistive devices as directed  Your healthcare provider may suggest that you use a cane or walker to help you keep your balance  You may need to have grab bars put in your bathroom near the toilet or in the shower  · Wear shoes that fit well and have soles that   Wear shoes both inside and outside  Use slippers with good   Do not wear shoes with high heels  · Wear a personal alarm  This is a device that allows you to call 911 if you fall and need help  Ask your healthcare provider for more information  · Stay active  Exercise can help strengthen your muscles and improve your balance  Your healthcare provider may recommend water aerobics or walking  He or she may also recommend physical therapy to improve your coordination  Never start an exercise program without talking to your healthcare provider first      · Manage your medical conditions  Keep all appointments with your healthcare providers  Visit your eye doctor as directed  Home safety tips:   · Add items to prevent falls in the bathroom  Put nonslip strips on your bath or shower floor to prevent you from slipping  Use a bath mat if you do not have carpet in the bathroom  This will prevent you from falling when you step out of the bath or shower  Use a shower seat so you do not need to stand while you shower   Sit on the toilet or a chair in your bathroom to dry yourself and put on clothing  This will prevent you from losing your balance from drying or dressing yourself while you are standing  · Keep paths clear  Remove books, shoes, and other objects from walkways and stairs  Place cords for telephones and lamps out of the way so that you do not need to walk over them  Tape them down if you cannot move them  Remove small rugs  If you cannot remove a rug, secure it with double-sided tape  This will prevent you from tripping  · Install bright lights in your home  Use night lights to help light paths to the bathroom or kitchen  Always turn on the light before you start walking  · Keep items you use often on shelves within reach  Do not use a step stool to help you reach an item  · Paint or place reflective tape on the edges of your stairs  This will help you see the stairs better  Follow up with your healthcare provider as directed:  Write down your questions so you remember to ask them during your visits  © 2017 [address] Information is for End User's use only and may not be sold, redistributed or otherwise used for commercial purposes  All illustrations and images included in CareNotes® are the copyrighted property of A D A M , Inc  or Greyson Héctor  The above information is an  only  It is not intended as medical advice for individual conditions or treatments  Talk to your doctor, nurse or pharmacist before following any medical regimen to see if it is safe and effective for you  Advance Directives   WHAT YOU NEED TO KNOW:   What are advance directives? Advance directives are legal documents that state your wishes and plans for medical care  These plans are made ahead of time in case you lose your ability to make decisions for yourself  Advance directives can apply to any medical decision, such as the treatments you want, and if you want to donate organs  What are the types of advance directives? There are many types of advance directives, and each state has rules about how to use them  You may choose a combination of any of the following:  · Living will: This is a written record of the treatment you want  You can also choose which treatments you do not want, which to limit, and which to stop at a certain time  This includes surgery, medicine, IV fluid, and tube feedings  · Durable power of  for healthcare [facility] [place]): This is a written record that states who you want to make healthcare choices for you when you are unable to make them for yourself  This person, called a proxy, is usually a family member or a friend  You may choose more than 1 proxy  · Do not resuscitate (DNR) order:  A DNR order is used in case your heart stops beating or you stop breathing  It is a request not to have certain forms of treatment, such as CPR  A DNR order may be included in other types of advance directives  · Medical directive: This covers the care that you want if you are in a coma, near death, or unable to make decisions for yourself  You can list the treatments you want for each condition  Treatment may include pain medicine, surgery, blood transfusions, dialysis, IV or tube feedings, and a ventilator (breathing machine)  · Values history: This document has questions about your views, beliefs, and how you feel and think about life  This information can help others choose the care that you would choose  Why are advance directives important? An advance directive helps you control your care  Although spoken wishes may be used, it is better to have your wishes written down  Spoken wishes can be misunderstood, or not followed  Treatments may be given even if you do not want them  An advance directive may make it easier for your family to make difficult choices about your care  How do I decide what to put in my advance directives?    · Make informed decisions:  Make sure you fully understand treatments or care you may receive  Think about the benefits and problems your decisions could cause for you or your family  Talk to healthcare providers if you have concerns or questions before you write down your wishes  You may also want to talk with your Christian or , or a   Check your state laws to make sure that what you put in your advance directive is legal      · Sign all forms:  Sign and date your advance directive when you have finished  You may also need 2 witnesses to sign the forms  Witnesses cannot be your doctor or his staff, your spouse, heirs or beneficiaries, people you owe money to, or your chosen proxy  Talk to your family, proxy, and healthcare providers about your advance directive  Give each person a copy, and keep one for yourself in a place you can get to easily  Do not keep it hidden or locked away  · Review and revise your plans: You can revise your advance directive at any time, as long as you are able to make decisions  Review your plan every year, and when there are changes in your life, or your health  When you make changes, let your family, proxy, and healthcare providers know  Give each a new copy  Where can I find more information? · [employer]  [address] [place] , [address]  Phone: 4- [phone]  Phone: 0- [phone]  Web Address: http://www  aafp org  · [address] [facility])  [address] [facility], [address]  Phone: [phone]  Phone: [phone]  Web Address: Sherita sagastume  CARE AGREEMENT:   You have the right to help plan your care  To help with this plan, you must learn about your health condition and treatment options  You must also learn about advance directives and how they are used  Work with your healthcare providers to decide what care will be used to treat you  You always have the right to refuse treatment  The above information is an  only  It is not intended as medical advice for individual conditions or treatments  Talk to your doctor, nurse or pharmacist before following any medical regimen to see if it is safe and effective for you  © 2017 [address] Information is for End User's use only and may not be sold, redistributed or otherwise used for commercial purposes  All illustrations and images included in CareNotes® are the copyrighted property of A D A M , Inc  or Greyson Sheehan in 1 year (on 11/27/2019)  Subjective:      Patient ID: Alton Olmstead is a 77 y o  female  Chief Complaint   Patient presents with    Rash     Patient here with a scaly rash on right facial cheek and upper chest        Rash on face for 3 weeks  2 chemo's do mention rash  Nto itchy  No new products        Rash   This is a new problem  The current episode started 1 to 4 weeks ago  The problem has been gradually worsening since onset  The affected locations include the face  She was exposed to nothing  Past treatments include nothing  The treatment provided no relief  The following portions of the patient's history were reviewed and updated as appropriate:  past social history    Review of Systems   Constitutional: Negative  HENT: Negative  Eyes: Negative  Respiratory: Negative  Cardiovascular: Negative  Gastrointestinal: Negative  Negative for bowel incontinence  Endocrine: Negative  Genitourinary: Negative  Musculoskeletal: Negative  Skin: Positive for rash  Allergic/Immunologic: Negative  Neurological: Negative  Hematological: Negative  Psychiatric/Behavioral: Negative  The patient is not nervous/anxious            Current Outpatient Prescriptions   Medication Sig Dispense Refill    Albuterol Sulfate (VENTOLIN HFA IN) Inhale      apixaban (ELIQUIS) 5 mg Take one tablet twice daily 180 tablet 3    Calcium Carb-Cholecalciferol 600-800 MG-UNIT TABS Take 1 tablet by mouth 2 (two) times a day      Cholecalciferol (VITAMIN D) 2000 units CAPS Take by mouth      clindamycin (CLEOCIN) 150 mg capsule       cyanocobalamin (VITAMIN B-12) 100 mcg tablet Take by mouth daily      dexamethasone (DECADRON) 4 mg tablet Take one tablet by mouth twice daily with food the day before chemo, the day of chemo, and the day after chemo  30 tablet 1    Linagliptin (TRADJENTA) 5 MG TABS Take 5 mg by mouth daily      metFORMIN (GLUCOPHAGE) 1000 MG tablet Take 1,000 mg by mouth 2 (two) times a day with meals   metoprolol tartrate (LOPRESSOR) 25 mg tablet Take 1 tablet (25 mg total) by mouth every 12 (twelve) hours 180 tablet 3    omeprazole (PriLOSEC) 20 mg delayed release capsule Take 1 capsule by mouth daily      ondansetron (ZOFRAN-ODT) 4 mg disintegrating tablet Take 4 mg by mouth every 6 (six) hours as needed for nausea or vomiting      oxyCODONE (ROXICODONE) 5 mg immediate release tablet Take 1 tablet (5 mg total) by mouth every 4 (four) hours as needed (pain) Max Daily Amount: 30 mg 100 tablet 0    predniSONE 10 mg tablet TAKE 1 TABLET (10 MG TOTAL) BY MOUTH DAILY 90 tablet 1    sotalol (BETAPACE) 80 mg tablet Take 1 tablet (80 mg total) by mouth every 12 (twelve) hours 180 tablet 3    venlafaxine (EFFEXOR) 75 mg tablet Take 0 5 tablets (37 5 mg total) by mouth daily 45 tablet 1     No current facility-administered medications for this visit        Facility-Administered Medications Ordered in Other Visits   Medication Dose Route Frequency Provider Last Rate Last Dose    alteplase (CATHFLO) injection 2 mg  2 mg Intracatheter PRN Rohini Viera MD        heparin lock flush 100 units/mL injection 300 Units  300 Units Intracatheter PRN Rohini Viera MD        sodium chloride 0 9 % infusion  20 mL/hr Intravenous Continuous Rohini Viera MD   Stopped at 11/27/18 1142       Objective:    /64   Pulse (!) 50   Temp (!) 97 3 °F (36 3 °C)   Resp 18 Ht 5' 3 78" (1 62 m)   Wt 98 1 kg (216 lb 3 2 oz)   LMP  (LMP Unknown)   SpO2 97%   BMI 37 37 kg/m²        Physical Exam   Constitutional: She appears well-developed and well-nourished  HENT:   Head: Normocephalic and atraumatic  Eyes: Pupils are equal, round, and reactive to light  Neck: Normal range of motion  Neck supple  Cardiovascular: Normal rate, regular rhythm, normal heart sounds and intact distal pulses  Pulmonary/Chest: Effort normal and breath sounds normal    Abdominal: Soft  Skin: Rash (right side of face wth brown rash macular, several spots, no reddness) noted  Nursing note and vitals reviewed  Davis Conti DO  Assessment and Plan:    Problem List Items Addressed This Visit     Facial rash - Primary     Unlikely due to ring worm  To see derm         Di[facility] mellitus type 2, uncomplicated [facility] )     Lab Results   Component Value Date    HGBA1C 7 6 (H) 09/10/2018       No results for input(s): POCGLU in the last 72 hours  Blood Sugar Average: Last 72 hrs:  Check labs in march           Relevant Orders    Comprehensive metabolic panel    Hemoglobin A1C    Microalbumin / creatinine urine ratio    TSH, 3rd generation with Free T4 reflex    Hyperlipidemia    Relevant Orders    Lipid Panel with Direct LDL reflex    TSH, 3rd generation with Free T4 reflex    Medicare annual wellness visit, subsequent     Up to date  Holding off on colonoscopy since getting cancer treatment             Health Maintenance Due   Topic Date Due    Hepatitis C Screening  1950    Urinary Incontinence Screening  07/07/2013    DM Eye Exam  01/27/2018         HPI:  Twin Murray is a 77 y o  female here for her Subsequent Wellness Visit      Patient Active Problem List   Diagnosis    Atrial fibrillation with rapid ventricular response (HCC)    Class 2 severe obesity due to excess calories with serious comorbidity and body mass index (BMI) of 38 0 to 38 9 in [facility])    Facial rash    Adenocarcinoma of lung, stage 4 (HCC)    Hyponatremia    Chronic diastolic heart failure (HCC)    Cancer related pain    Abnormal chest x-ray    Atrial fibrillation (HCC)    Benign essential hypertension    Diabetes mellitus type 2, uncomplicated (HCC)    Malignant neoplasm of bone with metastases (HCC)    Acid reflux    Hyperlipidemia    Insomnia    Major depressive disorder with single episode, in full remission ([facility] )    Vitamin D deficiency    Adhesive capsulitis of shoulder    Osteoarthritis of knee    Anxiety    Lung nodule, multiple    Medicare annual wellness visit, subsequent     Past Medical History:   Diagnosis Date    Arthritis     Atrial fibrillation ([facility] )     Diabetes mellitus ([facility] )     Diabetes mellitus type 2, uncomplicated ([facility] )     Last assessed: 8/17/17    Essential hypertension     Frozen shoulder     L shoulder    GERD (gastroesophageal reflux disease)     Hx of cancer of uterus     Last assessed: 8/21/15    Hyperlipidemia     Lung mass     diagnosed 9/2016    Malignant neoplasm without specification of site ([place] [address] )     Skin cancer, basal cell     right eye area    Stage 4 lung cancer ([facility] )      Past Surgical History:   Procedure Laterality Date    APPENDECTOMY      BRONCHOSCOPY N/A 11/17/2016    Procedure: BRONCHOSCOPY FLEXIBLE;  Surgeon: Valdo Staples MD;  [facility];  Service:    Toño Beltrán CHOLECYSTECTOMY      GALLBLADDER SURGERY      HYSTERECTOMY  2000    Total abdominal    LARYNGOSCOPY      Flexible Fiberoptic, (Therapeutic), Resolved: 11/17/16    MOHS SURGERY      Micrographic Surgery Face    NV BRONCHOSCOPY NEEDLE BX TRACHEA MAIN STEM&/BRON N/A 11/17/2016    Procedure: EBUS; FROZEN SECTION ;  Surgeon: Valdo Staples MD;  [facility];  Service: Thoracic    [address] N/A 5/24/2017    Procedure: BRONCHOSCOPY FLEXIBLE;  Surgeon: Caron Horta MD;  Location: [facility];   Service: Pulmonary    TONSILECTOMY AND ADNOIDECTOMY  TOTAL KNEE ARTHROPLASTY Right 09/23/2014     Family History   Problem Relation Age of Onset    Heart disease Father         cardiac disorder    Hypertension Father     Arthritis Father     Stroke Father         cerebrovascular accident   Javier Sheldon Diabetes Mother     Heart disease Mother    Javier Sheldon Stroke Mother         cerebrovascular accident   Javier Sheldon Dementia Mother    Javier Sheldon Hypertension Mother     Thyroid disease Mother     Cancer Maternal Grandfather         of unknown origin    Cancer Family     Depression Family     Diabetes Family     Hyperlipidemia Family         essential    Heart disease Family     Hypertension Family     Stroke Family         syndrome    Lung cancer Paternal Uncle      History   Smoking Status    Former Smoker    Packs/day: 1 00    Years: 50 00    Types: Cigarettes    Start date: 1962    Quit date: 2000   Smokeless Tobacco    Never Used     Comment: Quit in 2000     History   Alcohol Use No      History   Drug Use No       Current Outpatient Prescriptions   Medication Sig Dispense Refill    Albuterol Sulfate (VENTOLIN HFA IN) Inhale      apixaban (ELIQUIS) 5 mg Take one tablet twice daily 180 tablet 3    Calcium Carb-Cholecalciferol 600-800 MG-UNIT TABS Take 1 tablet by mouth 2 (two) times a day      Cholecalciferol (VITAMIN D) 2000 units CAPS Take by mouth      clindamycin (CLEOCIN) 150 mg capsule       cyanocobalamin (VITAMIN B-12) 100 mcg tablet Take by mouth daily      dexamethasone (DECADRON) 4 mg tablet Take one tablet by mouth twice daily with food the day before chemo, the day of chemo, and the day after chemo  30 tablet 1    Linagliptin (TRADJENTA) 5 MG TABS Take 5 mg by mouth daily      metFORMIN (GLUCOPHAGE) 1000 MG tablet Take 1,000 mg by mouth 2 (two) times a day with meals        metoprolol tartrate (LOPRESSOR) 25 mg tablet Take 1 tablet (25 mg total) by mouth every 12 (twelve) hours 180 tablet 3    omeprazole (PriLOSEC) 20 mg delayed release capsule Take 1 capsule by mouth daily      ondansetron (ZOFRAN-ODT) 4 mg disintegrating tablet Take 4 mg by mouth every 6 (six) hours as needed for nausea or vomiting      oxyCODONE (ROXICODONE) 5 mg immediate release tablet Take 1 tablet (5 mg total) by mouth every 4 (four) hours as needed (pain) Max Daily Amount: 30 mg 100 tablet 0    predniSONE 10 mg tablet TAKE 1 TABLET (10 MG TOTAL) BY MOUTH DAILY 90 tablet 1    sotalol (BETAPACE) 80 mg tablet Take 1 tablet (80 mg total) by mouth every 12 (twelve) hours 180 tablet 3    venlafaxine (EFFEXOR) 75 mg tablet Take 0 5 tablets (37 5 mg total) by mouth daily 45 tablet 1     No current facility-administered medications for this visit        Facility-Administered Medications Ordered in Other Visits   Medication Dose Route Frequency Provider Last Rate Last Dose    alteplase (CATHFLO) injection 2 mg  2 mg Intracatheter PRN Ulysses Melbourne, MD        heparin lock flush 100 units/mL injection 300 Units  300 Units Intracatheter PRN Ulysses Melbourne, MD        sodium chloride 0 9 % infusion  20 mL/hr Intravenous Continuous Ulysses Melbourne, MD   Stopped at 11/27/18 1142     Allergies   Allergen Reactions    Amoxicillin Rash    Cardizem [Diltiazem] Rash     Rash      Statins Myalgia     Severe muscle aching     Immunization History   Administered Date(s) Administered    H1N1, All Formulations 11/05/2009    Influenza 10/27/2014, 09/21/2015, 10/19/2016, 10/02/2017, 10/10/2018    Influenza TIV (IM) 10/27/2014, 10/19/2016, 10/02/2017    Pneumococcal Conjugate 13-Valent 12/03/2015    Pneumococcal Polysaccharide PPV23 09/26/2014    Tdap 10/06/2018    Zoster 10/03/2013       Patient Care Team:  Neema Arias DO as PCP - Markham Hitt, MD Genoveva Charters, MD Tammi Human, MD Ulysses Melbourne, MD Ione Homer, MD Renell Irving, DO Napoleon Frank, CRNP Ebb Dredge, MD  [facility]    Medicare Screening Tests and Risk Assessments:  Sarbjit Awan is here for her Subsequent Wellness visit  Health Risk Assessment:  Patient rates overall health as good  Patient feels that their physical health rating is Same  Eyesight was rated as Same  Hearing was rated as Same  Patient feels that their emotional and mental health rating is Same  Pain experienced by patient in the last 7 days has been Some  Patient's pain rating has been 3/10  Patient states that she has experienced no weight loss or gain in last 6 months  Emotional/Mental Health:  Patient has been feeling nervous/anxious  PHQ-9 Depression Screening:    Frequency of the following problems over the past two weeks:      1  Little interest or pleasure in doing things: 0 - not at all      2  Feeling down, depressed, or hopeless: 0 - not at all  PHQ-2 Score: 0          Broken Bones/Falls: Fall Risk Assessment:    In the past year, patient has experienced: History of falling in past year     Number of falls: 1  Patient does not feel she is unsteady standing  Patient is not taking medication that can cause feelings of lightheadedness or tiredness  Patient often has no need to rush to the toilet  Bladder/Bowel:  Patient has not leaked urine accidently in the last six months  Patient reports no loss of bowel control  Immunizations:  Patient has had a flu vaccination within the last year  Patient has received a pneumonia shot  Patient has received a shingles shot  Patient has received tetanus/diphtheria shot  Home Safety:  Patient does not have trouble with stairs inside or outside of their home  Patient currently reports that there are no safety hazards present in home, working smoke alarms, working carbon monoxide detectors        Preventative Screenings:   Breast cancer screening performed, no colon cancer screen completed, cholesterol screen completed, glaucoma eye exam completed,     Nutrition:  Current diet: Diabetic and No Added Salt with servings of the following:    Medications:  Patient is currently taking over-the-counter supplements  Patient is able to manage medications  Lifestyle Choices:  Patient reports no tobacco use  Patient has smoked or used tobacco in the past   Patient has stopped her tobacco use  Tobacco use quit date: 2000  Patient reports no alcohol use  Patient drives a vehicle  Patient wears seat belt  Current level of exercise of physical activity described by patient as: low  Activities of Daily Living:  Can get out of bed by his or her self, able to dress self, able to make own meals, able to do own shopping, able to bathe self, can do own laundry/housekeeping, can manage own money, pay bills and track expenses    Previous Hospitalizations:  No hospitalization or ED visit in past 12 months        Advanced Directives:  Patient has decided on a power of   Patient has spoken to designated power of   Patient has completed advanced directive  Preventative Screening/Counseling:      Cardiovascular:      General: Screening Current          Diabetes:      General: Screening Current          Colorectal Cancer:      General: Patient Declines          Breast Cancer:      General: Screening Current          Cervical Cancer:      General: Screening Not Indicated          Osteoporosis:      General: Screening Not Indicated          AAA:      General: Screening Not Indicated          Glaucoma:      General: Screening Current          HIV:      General: Screening Not Indicated          Hepatitis C:      General: Screening Not Indicated        Advanced Directives:   Patient has living will for healthcare, has durable POA for healthcare, patient has an advanced directive       Immunizations:  Patient reviewed and up to date

## 2018-11-27 NOTE — TELEPHONE ENCOUNTER
Please call patient and let her know to have PCP look at spot on face at appt tonight  PCP can advise her if she needs to use anything on the area

## 2018-11-27 NOTE — TELEPHONE ENCOUNTER
Per nurse Dara Roberts, I called pt and related to her to have her PCP take a look at her spot on face for medical advise  Pt agreed to plan of care and gave verbal understanding

## 2018-11-27 NOTE — TELEPHONE ENCOUNTER
SHE WANTED TO CHECK WITH US AS SHE HAS AN AGE SPOT ON HER FACE THAT IS GETTING LARGER AND SCALY  HER PAPERS ABOUT CHEMO SAID SHE MIGHT EXPERIENCE A  RASH AND TO USE NON-ALCHOLIC CREAM   SHOULD SHE TRY THIS? SHE WANTED TO NOTE TO US THAT SHE HAS AN APPT WITH HER PCP TONIGHT AT 5:15      PLEASE CALL TO ADVISE, BRUNO

## 2018-11-28 ENCOUNTER — OFFICE VISIT (OUTPATIENT)
Dept: PALLIATIVE MEDICINE | Facility: CLINIC | Age: 68
End: 2018-11-28
Payer: MEDICARE

## 2018-11-28 VITALS
SYSTOLIC BLOOD PRESSURE: 139 MMHG | DIASTOLIC BLOOD PRESSURE: 82 MMHG | WEIGHT: 217.4 LBS | RESPIRATION RATE: 18 BRPM | TEMPERATURE: 97.7 F | OXYGEN SATURATION: 98 % | HEART RATE: 68 BPM | BODY MASS INDEX: 37.57 KG/M2

## 2018-11-28 DIAGNOSIS — C34.92 ADENOCARCINOMA OF LEFT LUNG, STAGE 4 (HCC): Primary | ICD-10-CM

## 2018-11-28 DIAGNOSIS — G89.3 CANCER RELATED PAIN: Chronic | ICD-10-CM

## 2018-11-28 PROCEDURE — 99213 OFFICE O/P EST LOW 20 MIN: CPT | Performed by: NURSE PRACTITIONER

## 2018-11-28 NOTE — PROGRESS NOTES
Palliative and Supportive Care   Aki Dias Hero 77 y o  female [id number]    Assessment/Plan:  1  Adenocarcinoma of left lung, stage 4 ([address] )    2  Cancer related pain          Symptom management: pain has nearly resolved after receiving radiation; anxiety is well-controlled  - venlafaxine 37 5mg q HS  - oxycodone IR 5mg q 4h PRN - has not been taking  - PRN Miralax  - PRN ondansetron     GOC: continuing treatment; maintaining good quality of life       Follow up in a couple months      Subjective    Chief Concern  Follow up visit for:  Symptom management         History of Present Illness  Patient ID: Aliza Mathias is a 77 y o  female with 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  Previously on pembrolizumab, discontinued due to pneumonitis  S/p palliative RT to the thoracic spinal met in 12/2016 and to lesion of left scapular region in 10/2017  Found to have progression of left scapular met and of nodule of left upper lobe of lung  Started on nivolumab with low-dose prednisone  Imaging in Oct 2018 demonstrated progression of left scapular region; no new lesions  Received additional radiation to left scapular region  Started Alimta and carboplatin earlier this month, every 3 weeks  Follows with Dr Kaylah Calloway  Pain of left scapular region has been greatly improved  She is no longer taking any PRN oxycodone  Occasionally she will feel pinching pain of left shoulder  She is tolerating chemo well; she feels fatigued about 2 days after receiving  She takes Miralax as needed  She is sleeping well; mood is good   She is now receiving help from a cousin in caring for her disabled brother           The following portions of the patient's history were reviewed and updated as appropriate: allergies, current medications, past family history, past medical history, past social history, past surgical history and problem list       Visit Information    Accompanied By: No one  Source of History: Self  History Limitations: None    ROS  Review of Systems   Constitutional: Positive for fatigue  Negative for activity change and appetite change  Respiratory: Negative for shortness of breath  Musculoskeletal: Negative for back pain  Psychiatric/Behavioral: Negative for sleep disturbance  The patient is not nervous/anxious  Objective     Physical Exam   Constitutional: She is oriented to person, place, and time  She appears well-developed and well-nourished  She is cooperative  Pulmonary/Chest: Effort normal and breath sounds normal    Neurological: She is alert and oriented to person, place, and time  Skin: Skin is warm and dry  Psychiatric: She has a normal mood and affect  Her behavior is normal  Cognition and memory are normal          /82 (BP Location: Left arm, Patient Position: Sitting, Cuff Size: Standard)   Pulse 68   Temp 97 7 °F (36 5 °C) (Oral)   Resp 18   Wt 98 6 kg (217 lb 6 4 oz)   LMP  (LMP Unknown)   SpO2 98%   BMI 37 57 kg/m²       - ECOG Performance Status: 0-1      Current Outpatient Prescriptions:     Albuterol Sulfate (VENTOLIN HFA IN), Inhale, Disp: , Rfl:     apixaban (ELIQUIS) 5 mg, Take one tablet twice daily, Disp: 180 tablet, Rfl: 3    Calcium Carb-Cholecalciferol 600-800 MG-UNIT TABS, Take 1 tablet by mouth 2 (two) times a day, Disp: , Rfl:     Cholecalciferol (VITAMIN D) 2000 units CAPS, Take by mouth, Disp: , Rfl:     clindamycin (CLEOCIN) 150 mg capsule, , Disp: , Rfl:     cyanocobalamin (VITAMIN B-12) 100 mcg tablet, Take by mouth daily, Disp: , Rfl:     dexamethasone (DECADRON) 4 mg tablet, Take one tablet by mouth twice daily with food the day before chemo, the day of chemo, and the day after chemo , Disp: 30 tablet, Rfl: 1    Linagliptin (TRADJENTA) 5 MG TABS, Take 5 mg by mouth daily, Disp: , Rfl:     metFORMIN (GLUCOPHAGE) 1000 MG tablet, Take 1,000 mg by mouth 2 (two) times a day with meals  , Disp: , Rfl:     metoprolol tartrate (LOPRESSOR) 25 mg tablet, Take 1 tablet (25 mg total) by mouth every 12 (twelve) hours, Disp: 180 tablet, Rfl: 3    omeprazole (PriLOSEC) 20 mg delayed release capsule, Take 1 capsule by mouth daily, Disp: , Rfl:     ondansetron (ZOFRAN-ODT) 4 mg disintegrating tablet, Take 4 mg by mouth every 6 (six) hours as needed for nausea or vomiting, Disp: , Rfl:     oxyCODONE (ROXICODONE) 5 mg immediate release tablet, Take 1 tablet (5 mg total) by mouth every 4 (four) hours as needed (pain) Max Daily Amount: 30 mg, Disp: 100 tablet, Rfl: 0    predniSONE 10 mg tablet, TAKE 1 TABLET (10 MG TOTAL) BY MOUTH DAILY, Disp: 90 tablet, Rfl: 1    sotalol (BETAPACE) 80 mg tablet, Take 1 tablet (80 mg total) by mouth every 12 (twelve) hours, Disp: 180 tablet, Rfl: 3    venlafaxine (EFFEXOR) 37 5 mg tablet, Take 1 tablet (37 5 mg total) by mouth daily, Disp: 90 tablet, Rfl: 0      [facility] [place] [address] [facility] and Supportive Care  [phone]        Representatives have queried the patient's controlled substance dispensing history in the Prescription Drug Monitoring Program in compliance with the [facility] regulations before I have prescribed any controlled substances  The prescription history is consistent with prescribed therapy and our practice policies    '

## 2018-11-29 DIAGNOSIS — F41.9 ANXIETY: ICD-10-CM

## 2018-11-30 RX ORDER — VENLAFAXINE 37.5 MG/1
37.5 TABLET ORAL DAILY
Qty: 90 TABLET | Refills: 0 | Status: SHIPPED | OUTPATIENT
Start: 2018-11-30 | End: 2019-01-23 | Stop reason: SDUPTHER

## 2018-12-03 DIAGNOSIS — C34.92 ADENOCARCINOMA OF LEFT LUNG, STAGE 4 (HCC): ICD-10-CM

## 2018-12-03 DIAGNOSIS — G89.3 CANCER RELATED PAIN: Chronic | ICD-10-CM

## 2018-12-03 RX ORDER — OXYCODONE HYDROCHLORIDE 5 MG/1
5 TABLET ORAL EVERY 4 HOURS PRN
Qty: 60 TABLET | Refills: 0 | Status: SHIPPED | OUTPATIENT
Start: 2018-12-03 | End: 2019-01-11

## 2018-12-14 ENCOUNTER — OFFICE VISIT (OUTPATIENT)
Dept: HEMATOLOGY ONCOLOGY | Facility: CLINIC | Age: 68
End: 2018-12-14
Payer: MEDICARE

## 2018-12-14 ENCOUNTER — APPOINTMENT (OUTPATIENT)
Dept: LAB | Facility: CLINIC | Age: 68
End: 2018-12-14
Payer: MEDICARE

## 2018-12-14 ENCOUNTER — TRANSCRIBE ORDERS (OUTPATIENT)
Dept: LAB | Facility: CLINIC | Age: 68
End: 2018-12-14

## 2018-12-14 VITALS
RESPIRATION RATE: 18 BRPM | TEMPERATURE: 96.9 F | BODY MASS INDEX: 36.7 KG/M2 | HEIGHT: 64 IN | WEIGHT: 215 LBS | HEART RATE: 68 BPM | SYSTOLIC BLOOD PRESSURE: 130 MMHG | OXYGEN SATURATION: 98 % | DIASTOLIC BLOOD PRESSURE: 81 MMHG

## 2018-12-14 DIAGNOSIS — C34.92 ADENOCARCINOMA OF LEFT LUNG, STAGE 4 (HCC): Primary | ICD-10-CM

## 2018-12-14 PROCEDURE — 99214 OFFICE O/P EST MOD 30 MIN: CPT | Performed by: INTERNAL MEDICINE

## 2018-12-14 NOTE — PROGRESS NOTES
Hematology Outpatient Follow - Up Note  Yi Griffith 77 y o  female MRN: @ Encounter: [id number]        Date:  12/14/2018        Assessment/ Plan:       stage 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 of the lung with single involvement of the left scapula diagnosed initially in 08/2016, PD L1 expression more than 50%, she was treated with Pembrolizumab with pneumonitis and later on she continued on nivolumab with 10 mg p o  Prednisone daily with excellent response until progression of disease in October 2018 found in the PET scan with much enlargement of the left scapula and increased pain in that area, no new lesions in the chest abdomen pelvis    She received additional 10 courses of radiation therapy to the left scapula region finished on 11/26/2018     The therapy was changed to Alimta 500 milligram/meter squared carboplatin AUC 5 every 3 weeks with vitamin B12 1000 mcg IM shot every 3 weeks, proceed with cycle 2  Continue prednisone 10 mg p o  Daily and to stop the day before Alimta and the day of Alimta and the day after Alimta    After 3 cycles of therapy will do CT scan of the chest to assess response          HPI:  Anita Cuevas was admitted to the hospital with arrhythmia and was found to have right lower lobe infiltrate in August 2016  She wasreated with antibiotics however repeat chest x-ray showed persistent right lower lobe infiltrate  Subsequently the patient had a CT scan of the chest which showed a right perihilar mass, subcarinal lymphadenopathy, lytic lesion of the right costovertebral junction at T10 level   PET scan October 2016 showed a right perihilar mass measuring 3 5 cm with SUV of 8 9, subcarinal lymph nodes measuring 3 4 x 2 2 cm with SUV of 9 2  Nodule in the left upper lobe lung measured 2 x 1 1 cm   There was a lytic lesion involving the right 10th costovertebral junction with SUV of 14  4      Biopsy showed non-small cell carcinoma with features suggesting of adenocarcinoma positive for CK 7, CK 19, CA-19-9, ANEUDY-3, partially positive for P40, p63, negative for TTF-1   She had a history of uterine cancer in 2000 status post hysterectomy and did not require radiation or chemotherapy   She is status post bilateral oophorectomy, right knee replacement,   tonsillectomy   She used to smoke for 35 years 1 pack per day however quit smoking 21 years ago   She used hormonal replacement therapy for 3 years  Mino Martel has a family history significant for skin cancer in her father and coronary artery disease in mother  Mino Martel has 2 healthy children      She developed pneumonitis with Pembrolizumab requiring discontinuation of therapy for 6 months    She had progression of disease in the left upper lobe as well as the scapula even though she received radiation therapy to the scapula area  Nivolumab 240 mg flat dose every 2 weeks along with prednisone 10 mg p o  Daily initiated April 2018- October 2018      Pet/CT 10/22/2018 compared to PET/CT from 3/29/2018, chest CT from 8/23/2018:  Mild FDG uptake in the left upper lobe lesion, SUV max of 2 4, previously 4 2   This lesion measures 2 9 x 1 3(stable)  Enlarging FDG avid left scapular lesion, SUV max of 14 7, previously 8 3   This measures approximately 4 0 x 3 1 cm in size     Current Therapy:  11/6/2018: Alimta 500 milligram/meter squared, carboplatin AUC 5 every 3 weeks with Vitamin B12 1000 mcg IM shot every 3 weeks, multivitamin 1 tab p o  Daily     Previous Therapy:    1   Pembrolizumab 200 mg IV flat dose every 3 weeks initiated in December 2016, Finished in May 2017 secondary to grade 3 pneumonitis   2   radiation therapy to the left scapula in October 2017   3   Nivolumab 240 mg flat dose every 2 weeks initiated in April 2018 secondary to progression of the disease in the lung and the left scapula, prednisone 10 mg p o  daily to prevent pneumonitis   Nivolumab was continued every 2 weeks because of previous history of pneumonitis on Pembrolizumab  4   Additional 10 radiation tx to left scapula finished in November 2018          liquid biopsy showed K-rafia mutation G12C, no evidence of MSI high       ECOG score 0        Test Results:    Imaging: No results found  Labs:   Lab Results   Component Value Date    WBC 2 43 (L) 12/14/2018    HGB 10 4 (L) 12/14/2018    HCT 33 1 (L) 12/14/2018    MCV 83 12/14/2018     12/14/2018     Lab Results   Component Value Date     11/23/2015    K 3 4 (L) 12/14/2018     12/14/2018    CO2 30 12/14/2018    ANIONGAP 9 11/23/2015    BUN 14 12/14/2018    CREATININE 0 90 12/14/2018    GLUCOSE 149 (H) 11/23/2015    GLUF 139 (H) 12/14/2018    CALCIUM 9 0 12/14/2018    AST 21 12/14/2018    ALT 49 12/14/2018    ALKPHOS 128 (H) 12/14/2018    PROT 6 8 11/23/2015    BILITOT 0 65 11/23/2015    EGFR 65 12/14/2018       No results found for: IRON, TIBC, FERRITIN    No results found for: [id number]      ROS:   Review of Systems   Constitutional: Negative for activity change, appetite change, diaphoresis, fatigue, fever and unexpected weight change  HENT: Negative for facial swelling, hearing loss, rhinorrhea, sinus pain, sinus pressure, sneezing, sore throat and tinnitus  Eyes: Negative for photophobia, pain, discharge, redness, itching and visual disturbance  Respiratory: Negative for apnea and chest tightness  Cardiovascular: Negative for chest pain, palpitations and leg swelling  Gastrointestinal: Negative for abdominal distention, abdominal pain, blood in stool, constipation, diarrhea, nausea, rectal pain and vomiting  Endocrine: Negative for cold intolerance, heat intolerance, polydipsia and polyphagia  Genitourinary: Negative for difficulty urinating, dyspareunia, frequency, hematuria, pelvic pain and urgency  Musculoskeletal: Negative for arthralgias, back pain, gait problem, joint swelling and myalgias  Skin: Negative for color change, pallor and rash     Allergic/Immunologic: Negative for environmental allergies and food allergies  Neurological: Negative for dizziness, tremors, seizures, syncope, speech difficulty, numbness and headaches  Hematological: Negative for adenopathy  Does not bruise/bleed easily  Psychiatric/Behavioral: Negative for agitation, confusion, dysphoric mood, hallucinations and suicidal ideas  Current Medications: Reviewed  Allergies: Reviewed  PMH/FH/SH:  Reviewed      Physical Exam:    Body surface area is 2 01 meters squared  Wt Readings from Last 3 Encounters:   12/14/18 97 5 kg (215 lb)   11/28/18 98 6 kg (217 lb 6 4 oz)   11/27/18 97 8 kg (215 lb 8 oz)        Temp Readings from Last 3 Encounters:   12/14/18 (!) 96 9 °F (36 1 °C) (Tympanic)   11/28/18 97 7 °F (36 5 °C) (Oral)   11/27/18 98 7 °F (37 1 °C) (Oral)        BP Readings from Last 3 Encounters:   12/14/18 130/81   11/28/18 139/82   11/27/18 122/78         Pulse Readings from Last 3 Encounters:   12/14/18 68   11/28/18 68   11/27/18 71        Physical Exam   Constitutional: She is oriented to person, place, and time  She appears well-developed and well-nourished  No distress  HENT:   Head: Normocephalic and atraumatic  Mouth/Throat: Oropharynx is clear and moist  No oropharyngeal exudate  Eyes: Pupils are equal, round, and reactive to light  Conjunctivae and EOM are normal    Neck: Normal range of motion  Neck supple  No tracheal deviation present  No thyromegaly present  Cardiovascular: Normal rate and regular rhythm  Exam reveals no gallop and no friction rub  No murmur heard  Pulmonary/Chest: Effort normal and breath sounds normal  No respiratory distress  She has no wheezes  She has no rales  She exhibits no tenderness  Abdominal: Soft  Bowel sounds are normal  She exhibits no distension and no mass  There is no tenderness  There is no rebound and no guarding  Musculoskeletal: Normal range of motion  She exhibits no edema     Lymphadenopathy:     She has no cervical adenopathy  Neurological: She is alert and oriented to person, place, and time  Skin: Skin is warm and dry  No rash noted  She is not diaphoretic  No erythema  No pallor  Psychiatric: She has a normal mood and affect  Her behavior is normal  Judgment and thought content normal    Vitals reviewed  Goals and Barriers:  Current Goal: Minimize effects of disease  Barriers: None  Patient's Capacity to Self Care:  Patient is able to self care      Code Status: @[facility]@

## 2018-12-18 ENCOUNTER — HOSPITAL ENCOUNTER (OUTPATIENT)
Dept: INFUSION CENTER | Facility: CLINIC | Age: 68
Discharge: HOME | End: 2018-12-18

## 2018-12-18 VITALS
HEART RATE: 63 BPM | RESPIRATION RATE: 18 BRPM | OXYGEN SATURATION: 99 % | DIASTOLIC BLOOD PRESSURE: 63 MMHG | TEMPERATURE: 97.7 F | BODY MASS INDEX: 36.64 KG/M2 | SYSTOLIC BLOOD PRESSURE: 135 MMHG | WEIGHT: 212 LBS

## 2018-12-18 RX ORDER — SODIUM CHLORIDE 9 MG/ML
20 INJECTION, SOLUTION INTRAVENOUS CONTINUOUS
Status: DISCONTINUED | OUTPATIENT
Start: 2018-12-18 | End: 2018-12-18

## 2018-12-18 RX ORDER — CYANOCOBALAMIN 1000 UG/ML
1000 INJECTION INTRAMUSCULAR; SUBCUTANEOUS ONCE
Status: DISCONTINUED | OUTPATIENT
Start: 2018-12-18 | End: 2018-12-18

## 2018-12-18 NOTE — PROGRESS NOTES
Patient presents today for treatment with Carboplatin and Alimta  Patient offers no complaints  ANC 1 35, below treatment parameters  Spoke with Thomas Mora RN and per Dr Ryan Coyle, treatment to be held today and rescheduled for  after All  Hold order to follow  Patient verbalizes understanding of treatment schedule change  New schedule printed and given to patient  Reviewed infection prevention with patient and patient's friend

## 2018-12-25 DIAGNOSIS — E11.9 CONTROLLED TYPE 2 DIABETES MELLITUS WITHOUT COMPLICATION, WITHOUT LONG-TERM CURRENT USE OF INSULIN (HCC): ICD-10-CM

## 2018-12-25 DIAGNOSIS — K21.9 GERD WITHOUT ESOPHAGITIS: Primary | ICD-10-CM

## 2018-12-25 RX ORDER — OMEPRAZOLE 20 MG/1
CAPSULE, DELAYED RELEASE ORAL
Qty: 90 CAPSULE | Refills: 3 | Status: SHIPPED | OUTPATIENT
Start: 2018-12-25 | End: 2019-12-20 | Stop reason: SDUPTHER

## 2018-12-25 RX ORDER — LINAGLIPTIN 5 MG/1
TABLET, FILM COATED ORAL
Qty: 90 TABLET | Refills: 3 | Status: SHIPPED | OUTPATIENT
Start: 2018-12-25 | End: 2019-12-20 | Stop reason: SDUPTHER

## 2018-12-26 ENCOUNTER — APPOINTMENT (OUTPATIENT)
Dept: LAB | Facility: CLINIC | Age: 68
End: 2018-12-26
Payer: MEDICARE

## 2018-12-26 DIAGNOSIS — C34.90 MALIGNANT NEOPLASM OF LUNG, UNSPECIFIED LATERALITY, UNSPECIFIED PART OF LUNG (HCC): ICD-10-CM

## 2018-12-26 LAB
ALBUMIN SERPL BCP-MCNC: 3 G/DL (ref 3.5–5)
ALP SERPL-CCNC: 90 U/L (ref 46–116)
ALT SERPL W P-5'-P-CCNC: 29 U/L (ref 12–78)
ANION GAP SERPL CALCULATED.3IONS-SCNC: 5 MMOL/L (ref 4–13)
AST SERPL W P-5'-P-CCNC: 16 U/L (ref 5–45)
BASOPHILS # BLD AUTO: 0.04 THOUSANDS/ΜL (ref 0–0.1)
BASOPHILS NFR BLD AUTO: 1 % (ref 0–1)
BILIRUB SERPL-MCNC: 0.4 MG/DL (ref 0.2–1)
BUN SERPL-MCNC: 16 MG/DL (ref 5–25)
CALCIUM SERPL-MCNC: 8.9 MG/DL (ref 8.3–10.1)
CHLORIDE SERPL-SCNC: 105 MMOL/L (ref 100–108)
CO2 SERPL-SCNC: 32 MMOL/L (ref 21–32)
CREAT SERPL-MCNC: 0.87 MG/DL (ref 0.6–1.3)
EOSINOPHIL # BLD AUTO: 0.03 THOUSAND/ΜL (ref 0–0.61)
EOSINOPHIL NFR BLD AUTO: 1 % (ref 0–6)
ERYTHROCYTE [DISTWIDTH] IN BLOOD BY AUTOMATED COUNT: 19.4 % (ref 11.6–15.1)
GFR SERPL CREATININE-BSD FRML MDRD: 68 ML/MIN/1.73SQ M
GLUCOSE P FAST SERPL-MCNC: 134 MG/DL (ref 65–99)
HCT VFR BLD AUTO: 33.7 % (ref 34.8–46.1)
HGB BLD-MCNC: 10.7 G/DL (ref 11.5–15.4)
IMM GRANULOCYTES # BLD AUTO: 0.04 THOUSAND/UL (ref 0–0.2)
IMM GRANULOCYTES NFR BLD AUTO: 1 % (ref 0–2)
LYMPHOCYTES # BLD AUTO: 1.23 THOUSANDS/ΜL (ref 0.6–4.47)
LYMPHOCYTES NFR BLD AUTO: 26 % (ref 14–44)
MCH RBC QN AUTO: 27.2 PG (ref 26.8–34.3)
MCHC RBC AUTO-ENTMCNC: 31.8 G/DL (ref 31.4–37.4)
MCV RBC AUTO: 86 FL (ref 82–98)
MONOCYTES # BLD AUTO: 0.5 THOUSAND/ΜL (ref 0.17–1.22)
MONOCYTES NFR BLD AUTO: 11 % (ref 4–12)
NEUTROPHILS # BLD AUTO: 2.87 THOUSANDS/ΜL (ref 1.85–7.62)
NEUTS SEG NFR BLD AUTO: 60 % (ref 43–75)
NRBC BLD AUTO-RTO: 0 /100 WBCS
PLATELET # BLD AUTO: 240 THOUSANDS/UL (ref 149–390)
PMV BLD AUTO: 10.3 FL (ref 8.9–12.7)
POTASSIUM SERPL-SCNC: 3.9 MMOL/L (ref 3.5–5.3)
PROT SERPL-MCNC: 6.2 G/DL (ref 6.4–8.2)
RBC # BLD AUTO: 3.93 MILLION/UL (ref 3.81–5.12)
SODIUM SERPL-SCNC: 142 MMOL/L (ref 136–145)
WBC # BLD AUTO: 4.71 THOUSAND/UL (ref 4.31–10.16)

## 2018-12-26 PROCEDURE — 85025 COMPLETE CBC W/AUTO DIFF WBC: CPT

## 2018-12-26 PROCEDURE — 36415 COLL VENOUS BLD VENIPUNCTURE: CPT

## 2018-12-26 PROCEDURE — 80053 COMPREHEN METABOLIC PANEL: CPT

## 2018-12-27 ENCOUNTER — TELEPHONE (OUTPATIENT)
Dept: HEMATOLOGY ONCOLOGY | Facility: CLINIC | Age: 68
End: 2018-12-27

## 2018-12-27 RX ORDER — CYANOCOBALAMIN 1000 UG/ML
1000 INJECTION INTRAMUSCULAR; SUBCUTANEOUS ONCE
Status: COMPLETED | OUTPATIENT
Start: 2018-12-28 | End: 2018-12-28

## 2018-12-27 RX ORDER — SODIUM CHLORIDE 9 MG/ML
20 INJECTION, SOLUTION INTRAVENOUS CONTINUOUS
Status: DISCONTINUED | OUTPATIENT
Start: 2018-12-28 | End: 2018-12-31 | Stop reason: HOSPADM

## 2018-12-27 NOTE — TELEPHONE ENCOUNTER
Patient's ANC has improved and she is okay for treatment tomorrow  Called the patient and informed her she can receive chemo tomorrow  Patient verbalized understanding and had no further questions

## 2018-12-28 ENCOUNTER — HOSPITAL ENCOUNTER (OUTPATIENT)
Dept: INFUSION CENTER | Facility: CLINIC | Age: 68
Discharge: HOME | End: 2018-12-28
Payer: MEDICARE

## 2018-12-28 VITALS
WEIGHT: 218.26 LBS | RESPIRATION RATE: 16 BRPM | DIASTOLIC BLOOD PRESSURE: 80 MMHG | HEART RATE: 75 BPM | HEIGHT: 64 IN | BODY MASS INDEX: 37.26 KG/M2 | SYSTOLIC BLOOD PRESSURE: 150 MMHG | TEMPERATURE: 98 F

## 2018-12-28 PROCEDURE — 96372 THER/PROPH/DIAG INJ SC/IM: CPT

## 2018-12-28 PROCEDURE — 96367 TX/PROPH/DG ADDL SEQ IV INF: CPT

## 2018-12-28 PROCEDURE — 96411 CHEMO IV PUSH ADDL DRUG: CPT

## 2018-12-28 PROCEDURE — 96413 CHEMO IV INFUSION 1 HR: CPT

## 2018-12-28 RX ADMIN — CYANOCOBALAMIN 1000 MCG: 1000 INJECTION INTRAMUSCULAR; SUBCUTANEOUS at 10:23

## 2018-12-28 RX ADMIN — SODIUM CHLORIDE 1000 MG: 9 INJECTION, SOLUTION INTRAVENOUS at 10:18

## 2018-12-28 RX ADMIN — ONDANSETRON 16 MG: 2 INJECTION INTRAMUSCULAR; INTRAVENOUS at 09:32

## 2018-12-28 RX ADMIN — CARBOPLATIN 579 MG: 10 INJECTION, SOLUTION INTRAVENOUS at 10:35

## 2018-12-28 RX ADMIN — SODIUM CHLORIDE 20 ML/HR: 0.9 INJECTION, SOLUTION INTRAVENOUS at 09:30

## 2018-12-28 NOTE — PLAN OF CARE
Problem: Potential for Falls  Goal: Patient will remain free of falls  INTERVENTIONS:  - Assess patient frequently for physical needs  -  Identify cognitive and physical deficits and behaviors that affect risk of falls    -  [place]rett fall precautions as indicated by assessment   - Educate patient/family on patient safety including physical limitations  - Instruct patient to call for assistance with activity based on assessment  - Modify environment to reduce risk of injury  - Consider OT/PT consult to assist with strengthening/mobility   Outcome: Progressing

## 2018-12-31 ENCOUNTER — RADIATION ONCOLOGY FOLLOW-UP (OUTPATIENT)
Dept: RADIATION ONCOLOGY | Facility: HOSPITAL | Age: 68
End: 2018-12-31
Attending: STUDENT IN AN ORGANIZED HEALTH CARE EDUCATION/TRAINING PROGRAM
Payer: MEDICARE

## 2018-12-31 VITALS
DIASTOLIC BLOOD PRESSURE: 70 MMHG | SYSTOLIC BLOOD PRESSURE: 134 MMHG | HEART RATE: 60 BPM | BODY MASS INDEX: 38.62 KG/M2 | TEMPERATURE: 97.7 F | WEIGHT: 218 LBS | RESPIRATION RATE: 14 BRPM | OXYGEN SATURATION: 99 % | HEIGHT: 63 IN

## 2018-12-31 DIAGNOSIS — C34.92 ADENOCARCINOMA OF LEFT LUNG, STAGE 4 (HCC): Primary | ICD-10-CM

## 2018-12-31 PROCEDURE — 99214 OFFICE O/P EST MOD 30 MIN: CPT | Performed by: STUDENT IN AN ORGANIZED HEALTH CARE EDUCATION/TRAINING PROGRAM

## 2018-12-31 RX ORDER — ACETAMINOPHEN 325 MG/1
650 TABLET ORAL EVERY 6 HOURS PRN
COMMUNITY

## 2018-12-31 NOTE — PROGRESS NOTES
Keaton Griffith    No diagnosis found  Cancer Staging  No matching staging information was found for the patient  Oncology History    Patient returns today for RT follow-up  She completed palliative RT to left scapula on 11/26/18      77year old female with 2 year history of 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  She was treated with chemotherapy then immunotherapy  She also had palliative radiation therapy to T-Spine in 2016 and left scapular bone and soft tissue mass in 2017  She presented to Dr Jing Kovacs on 11/5/18 symptomatic again with pain in the previously treated left scapular area 8/10 but is relieved with oxycodone  She was started on chemotherapy Alimta and Carboplatin on 11/6/18 11/28/18 Palliative care follow-up  Pt reported pain nearly resolving since completing radiation  She has not been taking oxycodone 5mg  She has some if needed  F/U in a couple months  12/14/18 Dr Cheyanne Shay, Med Onc follow-up  Started Alimta/Carboplatin chemo Q 3 weeks on 11/6/18  She is continuing 10mg Prednisone daily except the day before, day of and day after Alimta infusion  Started 2nd cycle on 12/18/18  Plan for CT of chest after 3rd cycle to assess response  1/11/19 Med Onc follow-up with Raeann Mancilla PA-C              Adenocarcinoma of lung, stage 4 ([address] )    10/18/2016 Initial Diagnosis     Adenocarcinoma of lung, stage 4 ([address] )         10/18/2016 Biopsy     Final Diagnosis  A  Bone, T10, biopsy:     - Non-small cell carcinoma with features suggesting adenocarcinoma; see note               11/7/2016 - 12/1/2016 Radiation     Course 1: T9 - T11 SPINE  13 fractions   Total Dose = 3,250 cGy         11/17/2016 Biopsy     Final Diagnosis  Lymph Node, Level 7: Conclusive evidence of Malignancy  Poorly differentiated non-small cell carcinoma      Lung, right main stem bronchus, biopsy:              - Poorly differentiated adenocarcinoma           12/15/2016 - 6/1/2017 Chemotherapy Pembrolizumab 200 mg IV flat dose every 3 weeks          9/13/2017 Biopsy     Bone, left scapula, biopsy:  -  Positive for malignancy, consistent with metastatic adenocarcinoma              10/3/2017 - 10/16/2017 Radiation     palliative course of radiation therapy to the left scapular lesion to 3000 cGy( metastatic from adenocarcinoma of the lung)              4/10/2018 - 10/9/2018 Chemotherapy     nivolumab with prednisone 10 mg p o         11/6/2018 -  Chemotherapy     11/6/18   Alimta 500 milligram/meter squared, carboplatin AUC 5 every 3 weeks,         11/12/2018 - 11/26/2018 Radiation     Course: C3: Left Scapula retreat  10 fractions  2,000 cGy               Malignant neoplasm of bone with metastases ([facility] [address] )    5/24/2017 Initial Diagnosis     Malignant neoplasm of bone with metastases (HCC)          Interval History:***    GYN History:***    Patient Active Problem List   Diagnosis    Atrial fibrillation with rapid ventricular response (HCC)    Class 2 severe obesity due to excess calories with serious comorbidity and body mass index (BMI) of 38 0 to 38 9 in adult [facility])    Facial rash    Adenocarcinoma of lung, stage 4 (HCC)    Hyponatremia    Chronic diastolic heart failure (HCC)    Cancer related pain    Abnormal chest x-ray    Atrial fibrillation (HCC)    Benign essential hypertension    Diabetes mellitus type 2, uncomplicated ([address] )    Malignant neoplasm of bone with metastases (HCC)    Acid reflux    Hyperlipidemia    Insomnia    Major depressive disorder with single episode, in full remission ([address] )    Vitamin D deficiency    Adhesive capsulitis of shoulder    Osteoarthritis of knee    Anxiety    Lung nodule, multiple    Medicare annual wellness visit, subsequent     Past Medical History:   Diagnosis Date    Arthritis     Atrial fibrillation ([address] )     Diabetes mellitus ([address] )     Diabetes mellitus type 2, uncomplicated ([address] )     Last assessed: 8/17/17    Essential hypertension     Frozen shoulder     L shoulder    GERD (gastroesophageal reflux disease)     Hx of cancer of uterus     Last assessed: 8/21/15    Hyperlipidemia     Lung mass     diagnosed 9/2016    Malignant neoplasm without specification of site ([facility] [address] )     Skin cancer, basal cell     right eye area    Stage 4 lung cancer ([facility] [address] )      Past Surgical History:   Procedure Laterality Date    APPENDECTOMY      BRONCHOSCOPY N/A 11/17/2016    Procedure: BRONCHOSCOPY FLEXIBLE;  Surgeon: Gianfranco Norris MD;  Location: [facility];  Service:    [address]    Total abdominal    LARYNGOSCOPY      Flexible Fiberoptic, (Therapeutic), Resolved: 11/17/16    MOHS SURGERY      Micrographic Surgery Face    AR BRONCHOSCOPY NEEDLE BX TRACHEA MAIN STEM&/BRON N/A 11/17/2016    Procedure: EBUS; FROZEN SECTION ;  Surgeon: Gianfranco Norris MD;  Location[facility];  Service: Thoracic    AR [address] N/A 5/24/2017    Procedure: Kari Filler;  Surgeon: Estrella Torres MD;  Location: [facility]; Service: Pulmonary    TONSILECTOMY AND ADNOIDECTOMY      TOTAL KNEE ARTHROPLASTY Right 09/23/2014     Family History   Problem Relation Age of Onset    Heart disease Father         cardiac disorder    Hypertension Father     Arthritis Father     Stroke Father         cerebrovascular accident    Diabetes Mother     Heart disease Mother     Stroke Mother         cerebrovascular accident   [employer] Dementia Mother    [employer] Hypertension Mother     Thyroid disease Mother     Cancer Maternal Grandfather         of unknown origin    Cancer Family     Depression Family     Diabetes Family     Hyperlipidemia Family         essential    Heart disease Family     Hypertension Family     Stroke Family         syndrome    Lung cancer Paternal Uncle      Social History     Social History    Marital status:       Spouse name: N/A    Number of children: N/A    Years of education: N/A Occupational History    retired      Social History Main Topics    Smoking status: Former Smoker     Packs/day: 1 00     Years: 50 00     Types: Cigarettes     Start date: 1962     Quit date: 2000    Smokeless tobacco: Never Used      Comment: Quit in 2000    Alcohol use No    Drug use: No    Sexual activity: Not Currently     Other Topics Concern    Not on file     Social History Narrative    Lives with family  Daily caffeinated coffee consumption       Current Outpatient Prescriptions:     acetaminophen (TYLENOL) 325 mg tablet, Take 650 mg by mouth every 6 (six) hours as needed for mild pain, Disp: , Rfl:     Albuterol Sulfate (VENTOLIN HFA IN), Inhale 4 (four) times a day as needed  , Disp: , Rfl:     apixaban (ELIQUIS) 5 mg, Take one tablet twice daily (Patient taking differently: 5 mg Take one tablet twice daily ), Disp: 180 tablet, Rfl: 3    Calcium Carb-Cholecalciferol 600-800 MG-UNIT TABS, Take 1 tablet by mouth 2 (two) times a day, Disp: , Rfl:     Cholecalciferol (VITAMIN D) 2000 units CAPS, Take by mouth, Disp: , Rfl:     clindamycin (CLEOCIN) 150 mg capsule,  , Disp: , Rfl:     cyanocobalamin (VITAMIN B-12) 100 mcg tablet, Take by mouth daily, Disp: , Rfl:     dexamethasone (DECADRON) 4 mg tablet, Take one tablet by mouth twice daily with food the day before chemo, the day of chemo, and the day after chemo , Disp: 30 tablet, Rfl: 1    metFORMIN (GLUCOPHAGE) 1000 MG tablet, Take 1,000 mg by mouth 2 (two) times a day with meals  , Disp: , Rfl:     metoprolol tartrate (LOPRESSOR) 25 mg tablet, Take 1 tablet (25 mg total) by mouth every 12 (twelve) hours, Disp: 180 tablet, Rfl: 3    omeprazole (PriLOSEC) 20 mg delayed release capsule, TAKE 1 CAPSULE DAILY, Disp: 90 capsule, Rfl: 3    ondansetron (ZOFRAN-ODT) 4 mg disintegrating tablet, Take 4 mg by mouth every 6 (six) hours as needed for nausea or vomiting, Disp: , Rfl:     predniSONE 10 mg tablet, TAKE 1 TABLET (10 MG TOTAL) BY MOUTH DAILY, Disp: 90 tablet, Rfl: 1    sotalol (BETAPACE) 80 mg tablet, Take 1 tablet (80 mg total) by mouth every 12 (twelve) hours, Disp: 180 tablet, Rfl: 3    TRADJENTA 5 MG TABS, TAKE 1 TABLET DAILY, Disp: 90 tablet, Rfl: 3    venlafaxine (EFFEXOR) 37 5 mg tablet, Take 1 tablet (37 5 mg total) by mouth daily, Disp: 90 tablet, Rfl: 0    oxyCODONE (ROXICODONE) 5 mg immediate release tablet, Take 1 tablet (5 mg total) by mouth every 4 (four) hours as needed (pain) Max Daily Amount: 30 mg (Patient not taking: Reported on 12/31/2018 ), Disp: 60 tablet, Rfl: 0  No current facility-administered medications for this visit  Allergies   Allergen Reactions    Amoxicillin Rash    Cardizem [Diltiazem] Rash     Rash      Statins Myalgia     Severe muscle aching       Review of Systems:  Review of Systems    Vitals:    12/31/18 1000   BP: 134/70   BP Location: Left arm   Pulse: 60   Resp: 14   Temp: 97 7 °F (36 5 °C)   SpO2: 99%   Weight: 98 9 kg (218 lb)   Height: 5' 3" (1 6 m)       Imaging:No results found      Teaching:***

## 2018-12-31 NOTE — PROGRESS NOTES
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  She was treated with chemotherapy then immunotherapy  She also had palliative radiation therapy to T-Spine in 2016 and left scapular bone and soft tissue mass in 2017  She presented to Dr Roberto Brown on 11/5/18 symptomatic again with pain in the previously treated left scapular area 8/10 but is relieved with oxycodone  She was started on chemotherapy Alimta and Carboplatin on 11/6/18 11/28/18 Palliative care follow-up  Pt reported pain nearly resolving since completing radiation  She has not been taking oxycodone 5mg  She has some if needed  F/U in a couple months  12/14/18 Dr Magdaleno Burden, Med Onc follow-up  Started Alimta/Carboplatin chemo Q 3 weeks on 11/6/18  She is continuing 10mg Prednisone daily except the day before, day of and day after Alimta infusion  Started 2nd cycle on 12/18/18  Plan for CT of chest after 3rd cycle to assess response  1/11/19 Med Onc follow-up with Sanjay Lopez PA-C              Adenocarcinoma of lung, stage 4 ([facility] [address] )    10/18/2016 Initial Diagnosis     Adenocarcinoma of lung, stage 4 ([facility] [address] )         10/18/2016 Biopsy     Final Diagnosis  A  Bone, T10, biopsy:     - Non-small cell carcinoma with features suggesting adenocarcinoma; see note               11/7/2016 - 12/1/2016 Radiation     Course 1: T9 - T11 SPINE  13 fractions   Total Dose = 3,250 cGy         11/17/2016 Biopsy     Final Diagnosis  Lymph Node, Level 7: Conclusive evidence of Malignancy  Poorly differentiated non-small cell carcinoma      Lung, right main stem bronchus, biopsy:              - Poorly differentiated adenocarcinoma           12/15/2016 - 6/1/2017 Chemotherapy     Pembrolizumab 200 mg IV flat dose every 3 weeks          9/13/2017 Biopsy     Bone, left scapula, biopsy:  -  Positive for malignancy, consistent with metastatic adenocarcinoma              10/3/2017 - 10/16/2017 Radiation     palliative course of radiation therapy to the left scapular lesion to 3000 cGy( metastatic from adenocarcinoma of the lung)              4/10/2018 - 10/9/2018 Chemotherapy     nivolumab with prednisone 10 mg p o         11/6/2018 -  Chemotherapy     11/6/18   Alimta 500 milligram/meter squared, carboplatin AUC 5 every 3 weeks,         11/12/2018 - 11/26/2018 Radiation     Course: C3: Left Scapula retreat  10 fractions  2,000 cGy               Malignant neoplasm of bone with metastases ([facility] [address] )    5/24/2017 Initial Diagnosis     Malignant neoplasm of bone with metastases (HCC)          Clinical Trial: no        Interval History:Patient returns today for RT follow-up  She completed palliative RT to left scapula on 11/26/18      77year old female with 2 year history of 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  She was treated with chemotherapy then immunotherapy  She also had palliative radiation therapy to T-Spine in 2016 and left scapular bone and soft tissue mass in 2017  She presented to Dr Radha Bowie on 11/5/18 symptomatic again with pain in the previously treated left scapular area 8/10 but is relieved with oxycodone  She was started on chemotherapy Alimta and Carboplatin on 11/6/18 11/28/18 Palliative care follow-up  Pt reported pain nearly resolving since completing radiation  She has not been taking oxycodone 5mg  She has some if needed  F/U in a couple months  12/14/18 Dr Giacomo Real, Med Onc follow-up  Started Alimta/Carboplatin chemo Q 3 weeks on 11/6/18  She is continuing 10mg Prednisone daily except the day before, day of and day after Alimta infusion     Started 2nd cycle on 12/18/18  Plan for CT of chest after 3rd cycle to assess response  1/11/19 Med Onc follow-up with Lorena Castillo PA-C  Feeling a lot better today, her ROM is good in the BUE  No analgesics needed  She is quite active  Has some fatigue after chemo  Screening  Tobacco  Current tobacco user: no  If yes, brief counseling provided: NA    Hypertension  Hypertension screening performed: yes  Normotensive:  yes  If no, referred to PCP: n/a    Depression Screening  Screened for depression using PHQ-2: yes    Screened for depression using PHQ-9:  no  Screening positive or negative:  negative  If score >4, was any of the following actions taken?    Additional evaluation for depression, suicide risk assesment, referral to PCP or psychiatry, medication started:  [id number] [facility] for Patients >65 years  Advanced Care Planning Discussed:  yes  Patient named surrogate decision maker or care plan in chart: yes      Health Maintenance   Topic Date Due    Hepatitis C Screening  1950    DM Eye Exam  01/27/2018    HEMOGLOBIN A1C  03/10/2019    Diabetic Foot Exam  04/30/2019    URINE MICROALBUMIN  09/10/2019    Fall Risk  11/27/2019    Urinary Incontinence Screening  11/27/2019    Medicare Annual Wellness Visit (AWV)  11/27/2019    CRC Screening: Colonoscopy  04/30/2028    DTaP,Tdap,and Td Vaccines (2 - Td) 10/06/2028    INFLUENZA VACCINE  Completed    Pneumococcal PPSV23/PCV13 65+ Years / High and Highest Risk  Completed    Lung Cancer Screening  Excluded       Patient Active Problem List   Diagnosis    Atrial fibrillation with rapid ventricular response ([facility] [address] )    Class 2 severe obesity due to excess calories with serious comorbidity and body mass index (BMI) of 38 0 to 38 9 in adult [facility])    Facial rash    Adenocarcinoma of lung, stage 4 (HCC)    Hyponatremia    Chronic diastolic heart failure ([address] )    Cancer related pain    Abnormal chest x-ray    Atrial fibrillation ([address] )    Benign essential hypertension    Diabetes mellitus type 2, uncomplicated ([facility] [address] )    Malignant neoplasm of bone with metastases (HCC)    Acid reflux    Hyperlipidemia    Insomnia    Major depressive disorder with single episode, [facility] full [facility]    Vitamin D deficiency    Adhesive capsulitis of shoulder    Osteoarthritis of knee    Anxiety    Lung nodule, multiple    Medicare annual wellness visit, subsequent     Past Medical History:   Diagnosis Date    Arthritis     Atrial fibrillation [facility]     Diabetes mellitus ([facility]     Diabetes mellitus type 2, uncomplicated ([facility] )     Last assessed: 8/17/17    Essential hypertension     Frozen shoulder     L shoulder    GERD (gastroesophageal reflux disease)     Hx of cancer of uterus     Last assessed: 8/21/15    Hyperlipidemia     Lung mass     diagnosed 9/2016    Malignant neoplasm without specification of site ([place] [address] )     Skin cancer, basal cell     right eye area    Stage 4 lung cancer ([place] [address] )      Past Surgical History:   Procedure Laterality Date    APPENDECTOMY      BRONCHOSCOPY N/A 11/17/2016    Procedure: BRONCHOSCOPY FLEXIBLE;  Surgeon: Bridger Dunlap MD;  [facility]: [facility];  Service:    [employer] CHOLECYSTECTOMY      GALLBLADDER SURGERY      HYSTERECTOMY  2000    Total abdominal    LARYNGOSCOPY      Flexible Fiberoptic, (Therapeutic), Resolved: 11/17/16    MOHS SURGERY      Micrographic Surgery Face    [place] BRONCHOSCOPY NEEDLE BX TRACHEA MAIN STEM&/BRON N/A 11/17/2016    Procedure: EBUS; FROZEN SECTION ;  Surgeon: Bridger Dunlap MD;  Location: [facility];  Service: Thoracic    [place] [address] N/A 5/24/2017    Procedure: BRONCHOSCOPY FLEXIBLE;  Surgeon: Calvin Barlow MD;  Location: [facility];   Service: Pulmonary    TONSILECTOMY AND ADNOIDECTOMY      TOTAL KNEE ARTHROPLASTY Right 09/23/2014     Family History   Problem Relation Age of Onset    Heart disease Father         cardiac disorder    Hypertension Father     Arthritis Father  Stroke Father         cerebrovascular accident    Diabetes Mother     Heart disease Mother     Stroke Mother         cerebrovascular accident   Elayne Leisure Dementia Mother    Elayne Leisure Hypertension Mother     Thyroid disease Mother     Cancer Maternal Grandfather         of unknown origin    Cancer Family     Depression Family     Diabetes Family     Hyperlipidemia Family         essential    Heart disease Family     Hypertension Family     Stroke Family         syndrome    Lung cancer Paternal Uncle      Social History     Social History    Marital status:      Spouse name: N/A    Number of children: N/A    Years of education: N/A     Occupational History    retired      Social History Main Topics    Smoking status: Former Smoker     Packs/day: 1 00     Years: 50 00     Types: Cigarettes     Start date: 1962     Quit date: 2000    Smokeless tobacco: Never Used      Comment: Quit in 2000    Alcohol use No    Drug use: No    Sexual activity: Not Currently     Other Topics Concern    Not on file     Social History Narrative    Lives with family      Daily caffeinated coffee consumption       Current Outpatient Prescriptions:     acetaminophen (TYLENOL) 325 mg tablet, Take 650 mg by mouth every 6 (six) hours as needed for mild pain, Disp: , Rfl:     Albuterol Sulfate (VENTOLIN HFA IN), Inhale 4 (four) times a day as needed  , Disp: , Rfl:     apixaban (ELIQUIS) 5 mg, Take one tablet twice daily (Patient taking differently: 5 mg Take one tablet twice daily ), Disp: 180 tablet, Rfl: 3    Calcium Carb-Cholecalciferol 600-800 MG-UNIT TABS, Take 1 tablet by mouth 2 (two) times a day, Disp: , Rfl:     Cholecalciferol (VITAMIN D) 2000 units CAPS, Take by mouth, Disp: , Rfl:     clindamycin (CLEOCIN) 150 mg capsule,  , Disp: , Rfl:     cyanocobalamin (VITAMIN B-12) 100 mcg tablet, Take by mouth daily, Disp: , Rfl:     dexamethasone (DECADRON) 4 mg tablet, Take one tablet by mouth twice daily with food the day before chemo, the day of chemo, and the day after chemo , Disp: 30 tablet, Rfl: 1    metFORMIN (GLUCOPHAGE) 1000 MG tablet, Take 1,000 mg by mouth 2 (two) times a day with meals  , Disp: , Rfl:     metoprolol tartrate (LOPRESSOR) 25 mg tablet, Take 1 tablet (25 mg total) by mouth every 12 (twelve) hours, Disp: 180 tablet, Rfl: 3    omeprazole (PriLOSEC) 20 mg delayed release capsule, TAKE 1 CAPSULE DAILY, Disp: 90 capsule, Rfl: 3    ondansetron (ZOFRAN-ODT) 4 mg disintegrating tablet, Take 4 mg by mouth every 6 (six) hours as needed for nausea or vomiting, Disp: , Rfl:     predniSONE 10 mg tablet, TAKE 1 TABLET (10 MG TOTAL) BY MOUTH DAILY, Disp: 90 tablet, Rfl: 1    sotalol (BETAPACE) 80 mg tablet, Take 1 tablet (80 mg total) by mouth every 12 (twelve) hours, Disp: 180 tablet, Rfl: 3    TRADJENTA 5 MG TABS, TAKE 1 TABLET DAILY, Disp: 90 tablet, Rfl: 3    venlafaxine (EFFEXOR) 37 5 mg tablet, Take 1 tablet (37 5 mg total) by mouth daily, Disp: 90 tablet, Rfl: 0    oxyCODONE (ROXICODONE) 5 mg immediate release tablet, Take 1 tablet (5 mg total) by mouth every 4 (four) hours as needed (pain) Max Daily Amount: 30 mg (Patient not taking: Reported on 12/31/2018 ), Disp: 60 tablet, Rfl: 0  No current facility-administered medications for this visit  Allergies   Allergen Reactions    Amoxicillin Rash    Cardizem [Diltiazem] Rash     Rash      Statins Myalgia     Severe muscle aching       Review of Systems:  Review of Systems   Constitutional: Positive for fatigue (4/10, a little worse after chemo)  HENT: Negative  Eyes: Negative  Respiratory: Negative  Cardiovascular: Negative  Gastrointestinal: Positive for constipation (only when taking pain meds)  Endocrine: Negative  Genitourinary:        Has incontinence, varies depending on how much she drinks  Musculoskeletal:        Joint stiffness due to arthritis   Skin: Negative  Allergic/Immunologic: Negative      Neurological: Positive for light-headedness (only after bending over a lot taking care  of her brother  )  Hematological: Bruises/bleeds easily  Psychiatric/Behavioral: Negative  Vitals:    12/31/18 1000   BP: 134/70   BP Location: Left arm   Pulse: 60   Resp: 14   Temp: 97 7 °F (36 5 °C)   SpO2: 99%   Weight: 98 9 kg (218 lb)   Height: 5' 3" (1 6 m)            Imaging:No results found

## 2018-12-31 NOTE — PROGRESS NOTES
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  She was treated with chemotherapy then immunotherapy    She also had palliative radiation therapy to T-Spine in 2016 and left scapular bone and soft tissue mass in 2017   She presented to Dr Sugar Fernandez on 11/5/18 symptomatic again with pain in the previously treated left scapular area 8/10 but is relieved with oxycodone  She was started on chemotherapy Alimta and Carboplatin on 11/6/18 11/28/18 Palliative care follow-up  Pt reported pain nearly resolving since completing radiation  She has not been taking oxycodone 5mg  She has some if needed  F/U in a couple months       12/14/18 Dr Shraddha Jefferson, Med Onc follow-up  Started Alimta/Carboplatin chemo Q 3 weeks on 11/6/18  She is continuing 10mg Prednisone daily except the day before, day of and day after Alimta infusion  Started 2nd cycle on 12/18/18  Plan for CT of chest after 3rd cycle to assess response      1/11/19 Med Onc follow-up with Aretha Ramirez PA-C        Historical Information   Oncology History                 Adenocarcinoma of lung, stage 4 ([facility] [address] )    10/18/2016 Initial Diagnosis     Adenocarcinoma of lung, stage 4 ([address] )         10/18/2016 Biopsy     Final Diagnosis  A  Bone, T10, biopsy:     - Non-small cell carcinoma with features suggesting adenocarcinoma; see note               11/7/2016 - 12/1/2016 Radiation     Course 1: T9 - T11 SPINE  13 fractions   Total Dose = 3,250 cGy         11/17/2016 Biopsy     Final Diagnosis  Lymph Node, Level 7: Conclusive evidence of Malignancy  Poorly differentiated non-small cell carcinoma      Lung, right main stem bronchus, biopsy:              - Poorly differentiated adenocarcinoma           12/15/2016 - 6/1/2017 Chemotherapy     Pembrolizumab 200 mg IV flat dose every 3 weeks          9/13/2017 Biopsy     Bone, left scapula, biopsy:  -  Positive for malignancy, consistent with metastatic adenocarcinoma              10/3/2017 - 10/16/2017 Radiation     palliative course of radiation therapy to the left scapular lesion to 3000 cGy( metastatic from adenocarcinoma of the lung)              4/10/2018 - 10/9/2018 Chemotherapy     nivolumab with prednisone 10 mg p o         11/6/2018 -  Chemotherapy     11/6/18   Alimta 500 milligram/meter squared, carboplatin AUC 5 every 3 weeks,         11/12/2018 - 11/26/2018 Radiation     Course: C3: Left Scapula retreat  10 fractions  2,000 cGy               Malignant neoplasm of bone with metastases ([address] )    5/24/2017 Initial Diagnosis     Malignant neoplasm of bone with metastases ([address] )          Past Medical History:   Diagnosis Date    Arthritis     Atrial fibrillation ([address] )     Diabetes mellitus ([address] )     Diabetes mellitus type 2, uncomplicated ([address] )     Last assessed: 8/17/17    Essential hypertension     Frozen shoulder     L shoulder    GERD (gastroesophageal reflux disease)     Hx of cancer of uterus     Last assessed: 8/21/15    Hyperlipidemia     Lung mass     diagnosed 9/2016    Malignant neoplasm without specification of site ([address] )     Skin cancer, basal cell     right eye area    Stage 4 lung cancer ([address] )      Past Surgical History:   Procedure Laterality Date    APPENDECTOMY      BRONCHOSCOPY N/A 11/17/2016    Procedure: BRONCHOSCOPY FLEXIBLE;  Surgeon: Najma Vincent MD;  Location: [facility];  Service:    [facility] CHOLECYSTECTOMY      GALLBLADDER SURGERY      HYSTERECTOMY  2000    Total abdominal    LARYNGOSCOPY      Flexible Fiberoptic, (Therapeutic), Resolved: 11/17/16    MOHS SURGERY Micrographic Surgery Face    OK BRONCHOSCOPY NEEDLE BX TRACHEA MAIN STEM&/BRON N/A 11/17/2016    Procedure: EBUS; FROZEN SECTION ;  Surgeon: Deshaun Saleem MD;  Location: [facility];  Service: Thoracic    OK [address] N/A 5/24/2017    Procedure: Jacky Rocha;  Surgeon: Danay Corrales MD;  Location: [facility]; Service: Pulmonary    TONSILECTOMY AND ADNOIDECTOMY      TOTAL KNEE ARTHROPLASTY Right 09/23/2014       Social History   History   Alcohol Use No     History   Drug Use No     History   Smoking Status    Former Smoker    Packs/day: 1 00    Years: 50 00    Types: Cigarettes    Start date: 1962    Quit date: 2000   Smokeless Tobacco    Never Used     Comment: Quit in 2000         Meds/Allergies     Current Outpatient Prescriptions:     acetaminophen (TYLENOL) 325 mg tablet, Take 650 mg by mouth every 6 (six) hours as needed for mild pain, Disp: , Rfl:     Albuterol Sulfate (VENTOLIN HFA IN), Inhale 4 (four) times a day as needed  , Disp: , Rfl:     apixaban (ELIQUIS) 5 mg, Take one tablet twice daily (Patient taking differently: 5 mg Take one tablet twice daily ), Disp: 180 tablet, Rfl: 3    Calcium Carb-Cholecalciferol 600-800 MG-UNIT TABS, Take 1 tablet by mouth 2 (two) times a day, Disp: , Rfl:     Cholecalciferol (VITAMIN D) 2000 units CAPS, Take by mouth, Disp: , Rfl:     clindamycin (CLEOCIN) 150 mg capsule,  , Disp: , Rfl:     cyanocobalamin (VITAMIN B-12) 100 mcg tablet, Take by mouth daily, Disp: , Rfl:     dexamethasone (DECADRON) 4 mg tablet, Take one tablet by mouth twice daily with food the day before chemo, the day of chemo, and the day after chemo , Disp: 30 tablet, Rfl: 1    metFORMIN (GLUCOPHAGE) 1000 MG tablet, Take 1,000 mg by mouth 2 (two) times a day with meals  , Disp: , Rfl:     metoprolol tartrate (LOPRESSOR) 25 mg tablet, Take 1 tablet (25 mg total) by mouth every 12 (twelve) hours, Disp: 180 tablet, Rfl: 3    omeprazole (PriLOSEC) 20 mg delayed release capsule, TAKE 1 CAPSULE DAILY, Disp: 90 capsule, Rfl: 3    ondansetron (ZOFRAN-ODT) 4 mg disintegrating tablet, Take 4 mg by mouth every 6 (six) hours as needed for nausea or vomiting, Disp: , Rfl:     predniSONE 10 mg tablet, TAKE 1 TABLET (10 MG TOTAL) BY MOUTH DAILY, Disp: 90 tablet, Rfl: 1    sotalol (BETAPACE) 80 mg tablet, Take 1 tablet (80 mg total) by mouth every 12 (twelve) hours, Disp: 180 tablet, Rfl: 3    TRADJENTA 5 MG TABS, TAKE 1 TABLET DAILY, Disp: 90 tablet, Rfl: 3    venlafaxine (EFFEXOR) 37 5 mg tablet, Take 1 tablet (37 5 mg total) by mouth daily, Disp: 90 tablet, Rfl: 0    oxyCODONE (ROXICODONE) 5 mg immediate release tablet, Take 1 tablet (5 mg total) by mouth every 4 (four) hours as needed (pain) Max Daily Amount: 30 mg (Patient not taking: Reported on 12/31/2018 ), Disp: 60 tablet, Rfl: 0  No current facility-administered medications for this visit  Allergies   Allergen Reactions    Amoxicillin Rash    Cardizem [Diltiazem] Rash     Rash      Statins Myalgia     Severe muscle aching        Screening  Tobacco  Current tobacco user: no  If yes, brief counseling provided: NA     Hypertension  Hypertension screening performed: yes  Normotensive:  yes  If no, referred to PCP: n/a     Depression Screening  Screened for depression using PHQ-2: yes     Screened for depression using PHQ-9:  no  Screening positive or negative:  negative  If score >4, was any of the following actions taken? Additional evaluation for depression, suicide risk assesment, referral to PCP or psychiatry, medication started:  n/a     Advanced Care Planning for Patients >65 years  Advanced Care Planning Discussed:  yes  Patient named surrogate decision maker or care plan in chart: yes     Review of Systems   Constitutional: Positive for fatigue (4/10, a little worse after chemo)  HENT: Negative  Eyes: Negative  Respiratory: Negative  Cardiovascular: Negative      Gastrointestinal: Positive for constipation (only when taking pain meds)  Endocrine: Negative  Genitourinary:        Has incontinence, varies depending on how much she drinks  Musculoskeletal:        Joint stiffness due to arthritis   Skin: Negative  Allergic/Immunologic: Negative  Neurological: Positive for light-headedness (only after bending over a lot taking care  of her brother  )  Hematological: Bruises/bleeds easily  Psychiatric/Behavioral: Negative  OBJECTIVE:   /70 (BP Location: Left arm)   Pulse 60   Temp 97 7 °F (36 5 °C)   Resp 14   Ht 5' 3" (1 6 m)   Wt 98 9 kg (218 lb)   LMP  (LMP Unknown)   SpO2 99%   BMI 38 62 kg/m²   Pain Assessment:  0  ECOG/Zubrod/WHO: 1 - Symptomatic but completely ambulatory    Physical Exam   GENERAL:  Appears stated age, in no apparent distress  Alert and oriented  HEENT:  Normocephalic, atraumatic   extraocular muscles intact  Oral mucosa moist   PULMONARY:  Respirations unlabored  CARDIOVASCULAR:  Regular rate  ABDOMEN:  Soft, nondistended  NEUROLOGIC: Moving all extremities, No focal deficits noted  EXTREMITIES: no clubbing, cyanosis, or edema  PSYCHIATRIC: normal mood and affect  Appropriate thought content and judgement              RESULTS    Lab Results:   Recent Results (from the past 672 hour(s))   CBC and differential    Collection Time: 12/14/18  6:24 AM   Result Value Ref Range    WBC 2 43 (L) 4 31 - 10 16 Thousand/uL    RBC 3 98 3 81 - 5 12 Million/uL    Hemoglobin 10 4 (L) 11 5 - 15 4 g/dL    Hematocrit 33 1 (L) 34 8 - 46 1 %    MCV 83 82 - 98 fL    MCH 26 1 (L) 26 8 - 34 3 pg    MCHC 31 4 31 4 - 37 4 g/dL    RDW 16 7 (H) 11 6 - 15 1 %    MPV 10 6 8 9 - 12 7 fL    Platelets 796 152 - 314 Thousands/uL    nRBC 0 /100 WBCs    Neutrophils Relative 57 43 - 75 %    Immat GRANS % 0 0 - 2 %    Lymphocytes Relative 30 14 - 44 %    Monocytes Relative 13 (H) 4 - 12 %    Eosinophils Relative 0 0 - 6 %    Basophils Relative 0 0 - 1 %    Neutrophils Absolute 1 35 (L) 1 85 - 7 62 Thousands/µL    Immature Grans Absolute 0 01 0 00 - 0 20 Thousand/uL    Lymphocytes Absolute 0 73 0 60 - 4 47 Thousands/µL    Monocytes Absolute 0 32 0 17 - 1 22 Thousand/µL    Eosinophils Absolute 0 01 0 00 - 0 61 Thousand/µL    Basophils Absolute 0 01 0 00 - 0 10 Thousands/µL   Comprehensive metabolic panel    Collection Time: 12/14/18  6:24 AM   Result Value Ref Range    Sodium 139 136 - 145 mmol/L    Potassium 3 4 (L) 3 5 - 5 3 mmol/L    Chloride 101 100 - 108 mmol/L    CO2 30 21 - 32 mmol/L    ANION GAP 8 4 - 13 mmol/L    BUN 14 5 - 25 mg/dL    Creatinine 0 90 0 60 - 1 30 mg/dL    Glucose, Fasting 139 (H) 65 - 99 mg/dL    Calcium 9 0 8 3 - 10 1 mg/dL    AST 21 5 - 45 U/L    ALT 49 12 - 78 U/L    Alkaline Phosphatase 128 (H) 46 - 116 U/L    Total Protein 6 6 6 4 - 8 2 g/dL    Albumin 3 1 (L) 3 5 - 5 0 g/dL    Total Bilirubin 0 30 0 20 - 1 00 mg/dL    eGFR 65 ml/min/1 73sq m   CBC and differential    Collection Time: 12/26/18  6:16 AM   Result Value Ref Range    WBC 4 71 4 31 - 10 16 Thousand/uL    RBC 3 93 3 81 - 5 12 Million/uL    Hemoglobin 10 7 (L) 11 5 - 15 4 g/dL    Hematocrit 33 7 (L) 34 8 - 46 1 %    MCV 86 82 - 98 fL    MCH 27 2 26 8 - 34 3 pg    MCHC 31 8 31 4 - 37 4 g/dL    RDW 19 4 (H) 11 6 - 15 1 %    MPV 10 3 8 9 - 12 7 fL    Platelets 271 527 - 488 Thousands/uL    nRBC 0 /100 WBCs    Neutrophils Relative 60 43 - 75 %    Immat GRANS % 1 0 - 2 %    Lymphocytes Relative 26 14 - 44 %    Monocytes Relative 11 4 - 12 %    Eosinophils Relative 1 0 - 6 %    Basophils Relative 1 0 - 1 %    Neutrophils Absolute 2 87 1 85 - 7 62 Thousands/µL    Immature Grans Absolute 0 04 0 00 - 0 20 Thousand/uL    Lymphocytes Absolute 1 23 0 60 - 4 47 Thousands/µL    Monocytes Absolute 0 50 0 17 - 1 22 Thousand/µL    Eosinophils Absolute 0 03 0 00 - 0 61 Thousand/µL    Basophils Absolute 0 04 0 00 - 0 10 Thousands/µL   Comprehensive metabolic panel    Collection Time: 12/26/18  6:16 AM   Result Value Ref Range    Sodium 142 136 - 145 mmol/L    Potassium 3 9 3 5 - 5 3 mmol/L    Chloride 105 100 - 108 mmol/L    CO2 32 21 - 32 mmol/L    ANION GAP 5 4 - 13 mmol/L    BUN 16 5 - 25 mg/dL    Creatinine 0 87 0 60 - 1 30 mg/dL    Glucose, Fasting 134 (H) 65 - 99 mg/dL    Calcium 8 9 8 3 - 10 1 mg/dL    AST 16 5 - 45 U/L    ALT 29 12 - 78 U/L    Alkaline Phosphatase 90 46 - 116 U/L    Total Protein 6 2 (L) 6 4 - 8 2 g/dL    Albumin 3 0 (L) 3 5 - 5 0 g/dL    Total Bilirubin 0 40 0 20 - 1 00 mg/dL    eGFR 68 ml/min/1 73sq m       Imaging Studies:No results found  Assessment/Plan:  No orders of the defined types were placed in this encounter  Lizzeth Wang is a 77y o  year old female status post reirradiation of left scapula for palliation of pain  Overall, Ms Griffith is doing well, and denies any further pain in her shoulder  She does endorses arthritic pain elsewhere which is well controlled and not requiring pain medication  At this time, we will release patient care back to Medical oncology  No follow up has been coordinated through this department      Rip Elizondo MD  12/31/2018,11:36 AM    Portions of the record may have been created with voice recognition software   Occasional wrong word or "sound a like" substitutions may have occurred due to the inherent limitations of voice recognition software   Read the chart carefully and recognize, using context, where substitutions have occurred

## 2019-01-10 NOTE — PROGRESS NOTES
Hematology/Oncology Outpatient Follow- up Note  Abigail Griffith 77 y o  female MRN: @ Encounter: [id number]        Date:  1/11/2019      Assessment / Plan:    1  Stage 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 of the lung with single involvement of the left scapula diagnosed initially in 08/2016, PD L1 expression more than 50%, she was treated with Pembrolizumab with pneumonitis and later on she continued on nivolumab with 10 mg p o  Prednisone daily with excellent response until progression of disease in October 2018 found on the PET scan with enlargement of the left scapula metastasis and increased pain in that area, no new lesions in the chest abdomen pelvis  She received additional 10 courses of radiation therapy to the left scapula region finished on 11/26/2018  Pain completely resolved        The therapy was changed to Alimta 500 milligram/meter squared carboplatin AUC 5 every 3 weeks with vitamin B12 1000 mcg IM shot every 3 weeks initiated 11/6/2018  Cycle # 4 due 1/18/2019      Continue prednisone 10 mg p o  Daily and to stop the day before Alimta and the day of Alimta and the day after Alimta     After this cycle of therapy will do CT scan of the chest to assess response               HPI:  Marylene Epstein was admitted to the hospital with arrhythmia and was found to have right lower lobe infiltrate in August 2016   She wasreated with antibiotics however repeat chest x-ray showed persistent right lower lobe infiltrate  Subsequently the patient had a CT scan of the chest which showed a right perihilar mass, subcarinal lymphadenopathy, lytic lesion of the right costovertebral junction at T10 level   PET scan October 2016 showed a right perihilar mass measuring 3 5 cm with SUV of 8 9, subcarinal lymph nodes measuring 3 4 x 2 2 cm with SUV of 9 2   Nodule in the left upper lobe lung measured 2 x 1 1 cm   There was a lytic lesion involving the right 10th costovertebral junction with SUV of 14 4      Biopsy showed non-small cell carcinoma with features suggesting of adenocarcinoma positive for CK 7, CK 19, CA-19-9, ANEUDY-3, partially positive for P40, p63, negative for TTF-1   She had a history of uterine cancer in 2000 status post hysterectomy and did not require radiation or chemotherapy   She is status post bilateral oophorectomy, right knee replacement,   tonsillectomy   She used to smoke for 35 years 1 pack per day however quit smoking 21 years ago   She used hormonal replacement therapy for 3 years  Cande Castillo has a family history significant for skin cancer in her father and coronary artery disease in mother  Cande Castillo has 2 healthy children      She developed pneumonitis with Pembrolizumab requiring discontinuation of therapy for 6 months    She had progression of disease in the left upper lobe as well as the scapula even though she received radiation therapy to the scapula area  Nivolumab 240 mg flat dose every 2 weeks along with prednisone 10 mg p o  Daily initiated April 2018- October 2018      Pet/CT 10/22/2018 compared to PET/CT from 3/29/2018, chest CT from 8/23/2018: Lazara Hardwicko FDG uptake in the left upper lobe lesion, SUV max of 2 4, previously 4 2   This lesion measures 2 9 x 1 3(stable)   Enlarging FDG avid left scapular lesion, SUV max of 14 7, previously 8 3   This measures approximately 4 0 x 3 1 cm in size     Current Therapy:  11/6/2018: Alimta 500 milligram/meter squared, carboplatin AUC 5 every 3 weeks with Vitamin B12 1000 mcg IM shot every 3 weeks, multivitamin 1 tab p o  Daily     Previous Therapy:    1   Pembrolizumab 200 mg IV flat dose every 3 weeks initiated in December 2016, Finished in May 2017 secondary to grade 3 pneumonitis   2   radiation therapy to the left scapula in October 2017  3   Nivolumab 240 mg flat dose every 2 weeks initiated in April 2018 secondary to progression of the disease in the lung and the left scapula, prednisone 10 mg p o  daily to prevent pneumonitis  Nivolumab was continued every 2 weeks because of previous history of pneumonitis on Pembrolizumab  4   Additional 10 radiation tx to left scapula finished in November 2018            liquid biopsy showed K-rafia mutation G12C, no evidence of MSI high          HPI:        Interval History:            Test Results:        Labs:   Lab Results   Component Value Date    HGB 10 7 (L) 12/26/2018    HCT 33 7 (L) 12/26/2018    MCV 86 12/26/2018     12/26/2018    WBC 4 71 12/26/2018    NRBC 0 12/26/2018     Lab Results   Component Value Date     11/23/2015    K 3 9 12/26/2018     12/26/2018    CO2 32 12/26/2018    ANIONGAP 9 11/23/2015    BUN 16 12/26/2018    CREATININE 0 87 12/26/2018    GLUCOSE 149 (H) 11/23/2015    GLUF 134 (H) 12/26/2018    CALCIUM 8 9 12/26/2018    AST 16 12/26/2018    ALT 29 12/26/2018    ALKPHOS 90 12/26/2018    PROT 6 8 11/23/2015    BILITOT 0 65 11/23/2015    EGFR 68 12/26/2018       Imaging: No results found  ROS:  As mentioned in HPI & Interval History otherwise 14 point ROS negative  Allergies: Allergies   Allergen Reactions    Amoxicillin Rash    Cardizem [Diltiazem] Rash     Rash      Statins Myalgia     Severe muscle aching     Current Medications: Reviewed  PMH/FH/SH:  Reviewed      Physical Exam:    There is no height or weight on file to calculate BSA  Ht Readings from Last 3 Encounters:   12/31/18 5' 3" (1 6 m)   12/28/18 5' 3 58" (1 615 m)   12/14/18 5' 3 78" (1 62 m)        Wt Readings from Last 3 Encounters:   12/31/18 98 9 kg (218 lb)   12/28/18 99 kg (218 lb 4 1 oz)   12/18/18 96 2 kg (212 lb)        Temp Readings from Last 3 Encounters:   12/31/18 97 7 °F (36 5 °C)   12/28/18 98 °F (36 7 °C) (Tympanic)   12/18/18 97 7 °F (36 5 °C) (Temporal)        BP Readings from Last 3 Encounters:   12/31/18 134/70   12/28/18 150/80   12/18/18 135/63           Physical Exam   Constitutional: She is oriented to person, place, and time   She appears well-developed and well-nourished  No distress  HENT:   Head: Normocephalic and atraumatic  Nose: Nose normal    Mouth/Throat: Oropharynx is clear and moist    Eyes: Pupils are equal, round, and reactive to light  Conjunctivae and EOM are normal  No scleral icterus  Neck: Normal range of motion  Neck supple  No tracheal deviation present  Cardiovascular: Normal rate, regular rhythm, normal heart sounds and intact distal pulses  Exam reveals no gallop and no friction rub  No murmur heard  Pulmonary/Chest: Effort normal and breath sounds normal  No stridor  No respiratory distress  She has no wheezes  She has no rales  She exhibits no tenderness  Abdominal: Soft  Bowel sounds are normal  She exhibits no distension  There is no tenderness  Musculoskeletal: Normal range of motion  She exhibits no edema, tenderness or deformity  Lymphadenopathy:     She has no cervical adenopathy  Neurological: She is alert and oriented to person, place, and time  No cranial nerve deficit  Coordination normal    Skin: Skin is warm and dry  No rash noted  She is not diaphoretic  No erythema  No pallor  Psychiatric: She has a normal mood and affect   Her behavior is normal  Judgment and thought content normal        ECO      Emergency Contacts:    Charm Lesch, ,

## 2019-01-11 ENCOUNTER — OFFICE VISIT (OUTPATIENT)
Dept: HEMATOLOGY ONCOLOGY | Facility: CLINIC | Age: 69
End: 2019-01-11
Payer: MEDICARE

## 2019-01-11 VITALS
HEART RATE: 56 BPM | TEMPERATURE: 98 F | SYSTOLIC BLOOD PRESSURE: 140 MMHG | HEIGHT: 64 IN | BODY MASS INDEX: 37.08 KG/M2 | RESPIRATION RATE: 18 BRPM | OXYGEN SATURATION: 99 % | DIASTOLIC BLOOD PRESSURE: 80 MMHG | WEIGHT: 217.2 LBS

## 2019-01-11 DIAGNOSIS — C41.9 MALIGNANT NEOPLASM OF BONE WITH METASTASES (HCC): ICD-10-CM

## 2019-01-11 DIAGNOSIS — C34.92 ADENOCARCINOMA OF LEFT LUNG, STAGE 4 (HCC): Primary | ICD-10-CM

## 2019-01-11 PROCEDURE — 99214 OFFICE O/P EST MOD 30 MIN: CPT | Performed by: PHYSICIAN ASSISTANT

## 2019-01-11 RX ORDER — CYANOCOBALAMIN (VITAMIN B-12) 500 MCG
TABLET ORAL DAILY
COMMUNITY
Start: 2018-11-05 | End: 2021-07-19

## 2019-01-15 ENCOUNTER — TRANSCRIBE ORDERS (OUTPATIENT)
Dept: LAB | Facility: CLINIC | Age: 69
End: 2019-01-15

## 2019-01-15 ENCOUNTER — APPOINTMENT (OUTPATIENT)
Dept: LAB | Facility: CLINIC | Age: 69
End: 2019-01-15
Payer: MEDICARE

## 2019-01-15 ENCOUNTER — TELEPHONE (OUTPATIENT)
Dept: HEMATOLOGY ONCOLOGY | Facility: CLINIC | Age: 69
End: 2019-01-15

## 2019-01-15 DIAGNOSIS — E11.9 CONTROLLED TYPE 2 DIABETES MELLITUS WITHOUT COMPLICATION, WITHOUT LONG-TERM CURRENT USE OF INSULIN (HCC): Primary | ICD-10-CM

## 2019-01-15 DIAGNOSIS — C34.90 MALIGNANT NEOPLASM OF LUNG, UNSPECIFIED LATERALITY, UNSPECIFIED PART OF LUNG (HCC): ICD-10-CM

## 2019-01-15 LAB
ALBUMIN SERPL BCP-MCNC: 3.3 G/DL (ref 3.5–5)
ALP SERPL-CCNC: 73 U/L (ref 46–116)
ALT SERPL W P-5'-P-CCNC: 42 U/L (ref 12–78)
ANION GAP SERPL CALCULATED.3IONS-SCNC: 6 MMOL/L (ref 4–13)
AST SERPL W P-5'-P-CCNC: 21 U/L (ref 5–45)
BASOPHILS # BLD AUTO: 0.01 THOUSANDS/ΜL (ref 0–0.1)
BASOPHILS NFR BLD AUTO: 0 % (ref 0–1)
BILIRUB SERPL-MCNC: 0.4 MG/DL (ref 0.2–1)
BUN SERPL-MCNC: 19 MG/DL (ref 5–25)
CALCIUM SERPL-MCNC: 8.9 MG/DL (ref 8.3–10.1)
CHLORIDE SERPL-SCNC: 104 MMOL/L (ref 100–108)
CO2 SERPL-SCNC: 32 MMOL/L (ref 21–32)
CREAT SERPL-MCNC: 0.96 MG/DL (ref 0.6–1.3)
EOSINOPHIL # BLD AUTO: 0.02 THOUSAND/ΜL (ref 0–0.61)
EOSINOPHIL NFR BLD AUTO: 1 % (ref 0–6)
ERYTHROCYTE [DISTWIDTH] IN BLOOD BY AUTOMATED COUNT: 19.9 % (ref 11.6–15.1)
GFR SERPL CREATININE-BSD FRML MDRD: 60 ML/MIN/1.73SQ M
GLUCOSE P FAST SERPL-MCNC: 130 MG/DL (ref 65–99)
HCT VFR BLD AUTO: 35.8 % (ref 34.8–46.1)
HGB BLD-MCNC: 11.3 G/DL (ref 11.5–15.4)
IMM GRANULOCYTES # BLD AUTO: 0.02 THOUSAND/UL (ref 0–0.2)
IMM GRANULOCYTES NFR BLD AUTO: 1 % (ref 0–2)
LYMPHOCYTES # BLD AUTO: 0.94 THOUSANDS/ΜL (ref 0.6–4.47)
LYMPHOCYTES NFR BLD AUTO: 31 % (ref 14–44)
MCH RBC QN AUTO: 27.7 PG (ref 26.8–34.3)
MCHC RBC AUTO-ENTMCNC: 31.6 G/DL (ref 31.4–37.4)
MCV RBC AUTO: 88 FL (ref 82–98)
MONOCYTES # BLD AUTO: 0.36 THOUSAND/ΜL (ref 0.17–1.22)
MONOCYTES NFR BLD AUTO: 12 % (ref 4–12)
NEUTROPHILS # BLD AUTO: 1.67 THOUSANDS/ΜL (ref 1.85–7.62)
NEUTS SEG NFR BLD AUTO: 55 % (ref 43–75)
NRBC BLD AUTO-RTO: 0 /100 WBCS
PLATELET # BLD AUTO: 146 THOUSANDS/UL (ref 149–390)
PMV BLD AUTO: 10.7 FL (ref 8.9–12.7)
POTASSIUM SERPL-SCNC: 4 MMOL/L (ref 3.5–5.3)
PROT SERPL-MCNC: 6.3 G/DL (ref 6.4–8.2)
RBC # BLD AUTO: 4.08 MILLION/UL (ref 3.81–5.12)
SODIUM SERPL-SCNC: 142 MMOL/L (ref 136–145)
WBC # BLD AUTO: 3.02 THOUSAND/UL (ref 4.31–10.16)

## 2019-01-15 PROCEDURE — 80053 COMPREHEN METABOLIC PANEL: CPT

## 2019-01-15 PROCEDURE — 85025 COMPLETE CBC W/AUTO DIFF WBC: CPT

## 2019-01-15 PROCEDURE — 36415 COLL VENOUS BLD VENIPUNCTURE: CPT

## 2019-01-15 NOTE — TELEPHONE ENCOUNTER
Reviewed the patient's labs and per the chemo parameters she is good for treatment   Called the patient to let her know

## 2019-01-15 NOTE — TELEPHONE ENCOUNTER
Patient called and states she had labs drawn this morning at [facility]  She would like to know her results to make sure she is okay to have chemo on Friday        Patient can be reached at [phone]

## 2019-01-17 RX ORDER — CYANOCOBALAMIN 1000 UG/ML
1000 INJECTION INTRAMUSCULAR; SUBCUTANEOUS ONCE
Status: COMPLETED | OUTPATIENT
Start: 2019-01-18 | End: 2019-01-18

## 2019-01-17 RX ORDER — SODIUM CHLORIDE 9 MG/ML
20 INJECTION, SOLUTION INTRAVENOUS CONTINUOUS
Status: DISCONTINUED | OUTPATIENT
Start: 2019-01-18 | End: 2019-01-21 | Stop reason: HOSPADM

## 2019-01-18 ENCOUNTER — HOSPITAL ENCOUNTER (OUTPATIENT)
Dept: INFUSION CENTER | Facility: CLINIC | Age: 69
Discharge: HOME | End: 2019-01-18
Payer: MEDICARE

## 2019-01-18 VITALS
SYSTOLIC BLOOD PRESSURE: 125 MMHG | DIASTOLIC BLOOD PRESSURE: 61 MMHG | TEMPERATURE: 97.8 F | RESPIRATION RATE: 18 BRPM | BODY MASS INDEX: 38.89 KG/M2 | WEIGHT: 219.5 LBS | HEART RATE: 66 BPM | OXYGEN SATURATION: 97 % | HEIGHT: 63 IN

## 2019-01-18 PROCEDURE — 96372 THER/PROPH/DIAG INJ SC/IM: CPT

## 2019-01-18 PROCEDURE — 96413 CHEMO IV INFUSION 1 HR: CPT

## 2019-01-18 PROCEDURE — 96367 TX/PROPH/DG ADDL SEQ IV INF: CPT

## 2019-01-18 PROCEDURE — 96411 CHEMO IV PUSH ADDL DRUG: CPT

## 2019-01-18 RX ADMIN — ONDANSETRON 16 MG: 2 INJECTION INTRAMUSCULAR; INTRAVENOUS at 10:31

## 2019-01-18 RX ADMIN — SODIUM CHLORIDE 1000 MG: 9 INJECTION, SOLUTION INTRAVENOUS at 11:00

## 2019-01-18 RX ADMIN — SODIUM CHLORIDE 20 ML/HR: 0.9 INJECTION, SOLUTION INTRAVENOUS at 10:14

## 2019-01-18 RX ADMIN — CYANOCOBALAMIN 1000 MCG: 1000 INJECTION, SOLUTION INTRAMUSCULAR at 12:31

## 2019-01-18 RX ADMIN — CARBOPLATIN 578 MG: 10 INJECTION, SOLUTION INTRAVENOUS at 11:25

## 2019-01-22 ENCOUNTER — HOSPITAL ENCOUNTER (OUTPATIENT)
Dept: CT IMAGING | Facility: HOSPITAL | Age: 69
Discharge: HOME | End: 2019-01-22
Payer: MEDICARE

## 2019-01-22 DIAGNOSIS — C34.92 ADENOCARCINOMA OF LEFT LUNG, STAGE 4 (HCC): ICD-10-CM

## 2019-01-22 PROCEDURE — 71260 CT THORAX DX C+: CPT

## 2019-01-22 RX ADMIN — IOHEXOL 85 ML: 350 INJECTION, SOLUTION INTRAVENOUS at 07:59

## 2019-01-23 ENCOUNTER — OFFICE VISIT (OUTPATIENT)
Dept: PALLIATIVE MEDICINE | Facility: CLINIC | Age: 69
End: 2019-01-23
Payer: MEDICARE

## 2019-01-23 VITALS
OXYGEN SATURATION: 97 % | SYSTOLIC BLOOD PRESSURE: 160 MMHG | HEART RATE: 67 BPM | DIASTOLIC BLOOD PRESSURE: 76 MMHG | HEIGHT: 63 IN | WEIGHT: 218.13 LBS | TEMPERATURE: 97.8 F | BODY MASS INDEX: 38.65 KG/M2

## 2019-01-23 DIAGNOSIS — C34.92 ADENOCARCINOMA OF LEFT LUNG, STAGE 4 (HCC): Primary | ICD-10-CM

## 2019-01-23 DIAGNOSIS — F41.9 ANXIETY: ICD-10-CM

## 2019-01-23 PROCEDURE — 99214 OFFICE O/P EST MOD 30 MIN: CPT | Performed by: NURSE PRACTITIONER

## 2019-01-23 RX ORDER — VENLAFAXINE 37.5 MG/1
37.5 TABLET ORAL DAILY
Qty: 180 TABLET | Refills: 1 | Status: SHIPPED | OUTPATIENT
Start: 2019-01-23 | End: 2019-04-05 | Stop reason: SDUPTHER

## 2019-01-23 NOTE — PROGRESS NOTES
Palliative and Supportive Care   Jenna Griffith 77 y o  female [id number]    Assessment/Plan:  1  Adenocarcinoma of left lung, stage 4 ([address] )    2  Anxiety        Requested Prescriptions     Signed Prescriptions Disp Refills    venlafaxine (EFFEXOR) 37 5 mg tablet 180 tablet 1     Sig: Take 1 tablet (37 5 mg total) by mouth daily       Symptom management:  anxiety is well-controlled, no pain  - venlafaxine 37 5mg q HS       GOC: wants to continue cancer treatments as long as she is able and wishes to maintain good quality of life        Follow up as needed      Subjective    Chief Concern  Follow up visit for:  Symptom management         History of Present Illness  Patient ID: Alton Olmstead is a 77 y o  female with 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  Previously on pembrolizumab, discontinued due to pneumonitis  S/p palliative RT to the thoracic spinal met in 12/2016 and to lesion of left scapular region in 10/2017  Found to have progression of left scapular met and of nodule of left upper lobe of lung  Started on nivolumab with low-dose prednisone  Imaging in Oct 2018 demonstrated progression of left scapular region; no new lesions  Received additional radiation to left scapular region  Has been on Alimta and carboplatin; had follow up CT scan yesterday which is still pending  Follows with Dr Leopoldo Prose  She has been tolerating chemo well  Feels mild nausea and fatigue several days after chemo which lasts for a few days  Sometimes takes antiemetic  No vomiting  She is no longer having pain and no longer needing to take oxycodone  Mood has been good  Sleeping well  Takes nap in afternoon so that she has enough energy to care for her disabled brother  Appetite is good  Overall feels well and quality of life has been good           The following portions of the patient's history were reviewed and updated as appropriate: allergies, current medications, past family history, past medical history, past social history, past surgical history and problem list       Visit Information    Accompanied By: No one  Source of History: Self  History Limitations: None    ROS  Review of Systems   Constitutional: Positive for fatigue  Gastrointestinal: Negative  Musculoskeletal: Negative  Psychiatric/Behavioral: Negative  Objective     Physical Exam   Constitutional: She is oriented to person, place, and time  She appears well-developed and well-nourished  She is cooperative  Pulmonary/Chest: Effort normal    Neurological: She is alert and oriented to person, place, and time  Psychiatric: She has a normal mood and affect   Her behavior is normal  Cognition and memory are normal          /76 (BP Location: Left arm, Cuff Size: Standard)   Pulse 67   Temp 97 8 °F (36 6 °C) (Oral)   Ht 5' 3 39" (1 61 m)   Wt 98 9 kg (218 lb 2 oz)   LMP  (LMP Unknown)   SpO2 97%   BMI 38 17 kg/m²       - ECOG Performance Status: 0      Current Outpatient Prescriptions:     acetaminophen (TYLENOL) 325 mg tablet, Take 650 mg by mouth every 6 (six) hours as needed for mild pain, Disp: , Rfl:     apixaban (ELIQUIS) 5 mg, Take one tablet twice daily (Patient taking differently: 5 mg Take one tablet twice daily ), Disp: 180 tablet, Rfl: 3    Calcium Carb-Cholecalciferol 600-800 MG-UNIT TABS, Take 1 tablet by mouth 2 (two) times a day, Disp: , Rfl:     Cholecalciferol (VITAMIN D) 2000 units CAPS, Take by mouth, Disp: , Rfl:     cyanocobalamin (VITAMIN B-12) 100 mcg tablet, Take by mouth daily, Disp: , Rfl:     dexamethasone (DECADRON) 4 mg tablet, Take one tablet by mouth twice daily with food the day before chemo, the day of chemo, and the day after chemo , Disp: 30 tablet, Rfl: 1    Folic Acid 0 8 MG CAPS, , Disp: , Rfl:     metFORMIN (GLUCOPHAGE) 1000 MG tablet, Take 1 tablet (1,000 mg total) by mouth 2 (two) times a day with meals for 90 days, Disp: 180 tablet, Rfl: 3    metoprolol tartrate (LOPRESSOR) 25 mg tablet, Take 1 tablet (25 mg total) by mouth every 12 (twelve) hours, Disp: 180 tablet, Rfl: 3    omeprazole (PriLOSEC) 20 mg delayed release capsule, TAKE 1 CAPSULE DAILY, Disp: 90 capsule, Rfl: 3    predniSONE 10 mg tablet, TAKE 1 TABLET (10 MG TOTAL) BY MOUTH DAILY, Disp: 90 tablet, Rfl: 1    sotalol (BETAPACE) 80 mg tablet, Take 1 tablet (80 mg total) by mouth every 12 (twelve) hours, Disp: 180 tablet, Rfl: 3    TRADJENTA 5 MG TABS, TAKE 1 TABLET DAILY, Disp: 90 tablet, Rfl: 3    venlafaxine (EFFEXOR) 37 5 mg tablet, Take 1 tablet (37 5 mg total) by mouth daily, Disp: 180 tablet, Rfl: [address] [facility]ive [facility]  [phone]        Representatives have queried the patient's controlled substance dispensing history in the Prescription Drug Monitoring Program in compliance with the [facility] regulations before I have prescribed any controlled substances  The prescription history is consistent with prescribed therapy and our practice policies

## 2019-01-28 ENCOUNTER — OFFICE VISIT (OUTPATIENT)
Dept: HEMATOLOGY ONCOLOGY | Facility: CLINIC | Age: 69
End: 2019-01-28
Payer: MEDICARE

## 2019-01-28 DIAGNOSIS — C34.92 ADENOCARCINOMA OF LEFT LUNG, STAGE 4 (HCC): Primary | ICD-10-CM

## 2019-01-28 PROCEDURE — 99214 OFFICE O/P EST MOD 30 MIN: CPT | Performed by: INTERNAL MEDICINE

## 2019-01-28 NOTE — PROGRESS NOTES
Hematology Outpatient Follow - Up Note  Amy Griffith 77 y o  female MRN: @ Encounter: [id number]        Date:  1/28/2019        Assessment/ Plan:     Adenocarcinoma of the lung primary in the left upper lobe with involvement of the left scapula stage IV diagnosed initially on 08/2016, PDA expression more than 50%, she was treated with Pembrolizumab complicated with pneumonitis and later on she continued on nivolumab and we added prednisone 10 mg p o  Daily with excellent response until progression of disease in October 2018, she was found to have enlargement of the left scapular metastasis and increased pain in that area on the PET scan, no new lesions in the abdomen or pelvis    She received additional 10 courses of radiation therapy to the left scapula and was treated with Alimta 500 milligram/meter squared, carboplatin AUC 5 and vitamin B12, she had 3 cycles    CT scan showed stable disease, decrease in the size of the left scapula bony lesion, no new lesions    Continue Alimta 500 milligram/meter squared, carboplatin AUC 5 every 3 weeks dose 4 , 5, 6  Continue prednisone 10 mg p o  Daily and start the day before Alimta and the day after Alimta for Decadron    Follow-up every 3 weeks with CBC, CMP           HPI:  Jorge L Slater was admitted to the hospital with arrhythmia and was found to have right lower lobe infiltrate in August 2016   She wasreated with antibiotics however repeat chest x-ray showed persistent right lower lobe infiltrate  Subsequently the patient had a CT scan of the chest which showed a right perihilar mass, subcarinal lymphadenopathy, lytic lesion of the right costovertebral junction at T10 level   PET scan October 2016 showed a right perihilar mass measuring 3 5 cm with SUV of 8 9, subcarinal lymph nodes measuring 3 4 x 2 2 cm with SUV of 9 2   Nodule in the left upper lobe lung measured 2 x 1 1 cm   There was a lytic lesion involving the right 10th costovertebral junction with SUV of 14 4      Biopsy showed non-small cell carcinoma with features suggesting of adenocarcinoma positive for CK 7, CK 19, CA-19-9, ANEUDY-3, partially positive for P40, p63, negative for TTF-1   She had a history of uterine cancer in 2000 status post hysterectomy and did not require radiation or chemotherapy   She is status post bilateral oophorectomy, right knee replacement,   tonsillectomy   She used to smoke for 35 years 1 pack per day however quit smoking 21 years ago   She used hormonal replacement therapy for 3 years  Dagoberto Michaud has a family history significant for skin cancer in her father and coronary artery disease in mother  Dagoberto Michaud has 2 healthy children      She developed pneumonitis with Pembrolizumab requiring discontinuation of therapy for 6 months    She had progression of disease in the left upper lobe as well as the scapula even though she received radiation therapy to the scapula area  Nivolumab 240 mg flat dose every 2 weeks along with prednisone 10 mg p o  Daily initiated April 2018- October 2018      Pet/CT 10/22/2018 compared to PET/CT from 3/29/2018, chest CT from 8/23/2018: [facility] FDG uptake in the left upper lobe lesion, SUV max of 2 4, previously 4 2   This lesion measures 2 9 x 1 3(stable)   Enlarging FDG avid left scapular lesion, SUV max of 14 7, previously 8 3   This measures approximately 4 0 x 3 1 cm in size     Current Therapy:  11/6/2018: Alimta 500 milligram/meter squared, carboplatin AUC 5 every 3 weeks with Vitamin B12 1000 mcg IM shot every 3 weeks, multivitamin 1 tab p o  Daily     Previous Therapy:    1   Pembrolizumab 200 mg IV flat dose every 3 weeks initiated in December 2016, Finished in May 2017 secondary to grade 3 pneumonitis   2   radiation therapy to the left scapula in October 2017  3   Nivolumab 240 mg flat dose every 2 weeks initiated in April 2018 secondary to progression of the disease in the lung and the left scapula, prednisone 10 mg p o  daily to prevent pneumonitis  Nivolumab was continued every 2 weeks because of previous history of pneumonitis on Pembrolizumab  4   Additional 10 radiation tx to left scapula finished in November 2018            liquid biopsy showed K-rafia mutation G12C, no evidence of MSI high       ECOG score 0          Test Results:    Imaging: Ct Chest W Contrast    Result Date: 1/23/2019  Narrative: CT CHEST WITH IV CONTRAST INDICATION:   C34 92: Malignant neoplasm of unspecified part of left bronchus or lung  Orysia Ervin Darling's note from 1/11/2019 was reviewed, which states patient has stage IV adenocarcinoma of the lung left scapular metastasis  Patient has had radiation therapy to the left scapula finished on 11/26/2018  COMPARISON:  Chest CT from 8/23/2018  TECHNIQUE: CT examination of the chest was performed  Axial, sagittal, and coronal 2D reformatted images were created from the source data and submitted for interpretation  Radiation dose length product (DLP) for this visit:  615 mGy-cm   This examination, like all CT scans performed in the [facility], was performed utilizing techniques to minimize radiation dose exposure, including the use of iterative reconstruction and automated exposure control  IV Contrast:  85 mL of iohexol (OMNIPAQUE) FINDINGS: LUNGS:  Unchanged part solid 2 7 x 1 3 cm left upper lobe pulmonary nodule (series 2 images 18 through 23 )  There is also unchanged, adjacent 4 mm left upper lobe solid pulmonary nodule (series 2 image 21 )  Unchanged 10 mm left lower lobe basilar pulmonary nodule (series 2 image 42 )  Unchanged right middle lobe scarring  No new pulmonary nodules  PLEURA:  Unremarkable  HEART/GREAT VESSELS:  Unremarkable for patient's age  MEDIASTINUM AND ANABEL:  Unremarkable  CHEST WALL AND LOWER NECK: There is a 1 2 cm right thyroid nodule  There are several additional subcentimeter thyroid nodules    Incidental discovery of one or more thyroid nodule(s) measuring less than 1 5 cm and without suspicious features is noted in this patient who is above 28years old; according to guidelines published in the February 2015 white paper on incidental thyroid nodules in the Journal of the [employer] of [employer]iology Ilya Mauro), no further evaluation is recommended  VISUALIZED STRUCTURES IN THE UPPER ABDOMEN:   Several too small to characterize hypodensities in the liver are unchanged  There are cholecystectomy clips  Unchanged 1 4 x 1 6 cm cystic lesion in the pancreatic body  (Series 2 image 59 ) OSSEOUS STRUCTURES: Decreased size of the lytic metastasis of the left scapula, currently 2 9 x 1 7 cm, previously 4 1 x 2 5 (series 2 image 15 )  There is also an unchanged lytic metastasis at the right costovertebral junction of T10 measuring approximately 1 7 x 1 2 cm (series 603 image 92 )     Impression: Compared to chest CT from 8/23/2018, unchanged part solid 2 7 x 1 3 cm left upper lobe pulmonary nodule consistent with patient's primary lung cancer  (series 2 images 18 through 23 ) Several additional small pulmonary nodules are also unchanged  Decreased size of the lytic metastasis of the left scapula, currently 2 9 x 1 7 cm, previously 4 1 x 2 5 (series 2 image 15 ) Unchanged lytic metastasis at the right costovertebral junction of T10 measuring approximately 1 7 x 1 2 cm (series 603 image 92 ) Unchanged 1 4 x 1 6 cm cystic lesion in the pancreatic body     (Series 2 image 59 )  Workstation performed: [id number]       Labs:   Lab Results   Component Value Date    WBC 3 02 (L) 01/15/2019    HGB 11 3 (L) 01/15/2019    HCT 35 8 01/15/2019    MCV 88 01/15/2019     (L) 01/15/2019     Lab Results   Component Value Date     11/23/2015    K 4 0 01/15/2019     01/15/2019    CO2 32 01/15/2019    ANIONGAP 9 11/23/2015    BUN 19 01/15/2019    CREATININE 0 96 01/15/2019    GLUCOSE 149 (H) 11/23/2015    GLUF 130 (H) 01/15/2019    CALCIUM 8 9 01/15/2019    AST 21 01/15/2019    ALT 42 01/15/2019    ALKPHOS 73 01/15/2019    PROT 6 8 11/23/2015    BILITOT 0 65 11/23/2015    EGFR 60 01/15/2019       No results found for: IRON, TIBC, FERRITIN    No results found for: [id number]      ROS:   Review of Systems   Constitutional: Negative for activity change, appetite change, diaphoresis, fatigue, fever and unexpected weight change  HENT: Negative for facial swelling, hearing loss, rhinorrhea, sinus pain, sinus pressure, sneezing, sore throat and tinnitus  Eyes: Negative for photophobia, pain, discharge, redness, itching and visual disturbance  Respiratory: Negative for apnea and chest tightness  Cardiovascular: Negative for chest pain, palpitations and leg swelling  Gastrointestinal: Negative for abdominal distention, abdominal pain, blood in stool, constipation, diarrhea, nausea, rectal pain and vomiting  Endocrine: Negative for cold intolerance, heat intolerance, polydipsia and polyphagia  Genitourinary: Negative for difficulty urinating, dyspareunia, frequency, hematuria, pelvic pain and urgency  Musculoskeletal: Negative for arthralgias, back pain, gait problem, joint swelling and myalgias  Skin: Negative for color change, pallor and rash  Allergic/Immunologic: Negative for environmental allergies and food allergies  Neurological: Negative for dizziness, tremors, seizures, syncope, speech difficulty, numbness and headaches  Hematological: Negative for adenopathy  Does not bruise/bleed easily  Psychiatric/Behavioral: Negative for agitation, confusion, dysphoric mood, hallucinations and suicidal ideas  Current Medications: Reviewed  Allergies: Reviewed  PMH/FH/SH:  Reviewed      Physical Exam:    There is no height or weight on file to calculate BSA      Wt Readings from Last 3 Encounters:   01/23/19 98 9 kg (218 lb 2 oz)   01/18/19 99 6 kg (219 lb 8 oz)   01/11/19 98 5 kg (217 lb 3 2 oz)        Temp Readings from Last 3 Encounters:   01/23/19 97 8 °F (36 6 °C) (Oral)   01/18/19 97 8 °F (36 6 °C) (Oral)   01/11/19 98 °F (36 7 °C) (Oral)        BP Readings from Last 3 Encounters:   01/23/19 160/76   01/18/19 125/61   01/11/19 140/80         Pulse Readings from Last 3 Encounters:   01/23/19 67   01/18/19 66   01/11/19 56        Physical Exam   Constitutional: She is oriented to person, place, and time  She appears well-developed and well-nourished  No distress  HENT:   Head: Normocephalic and atraumatic  Mouth/Throat: Oropharynx is clear and moist  No oropharyngeal exudate  Eyes: Pupils are equal, round, and reactive to light  Conjunctivae and EOM are normal    Neck: Normal range of motion  Neck supple  No tracheal deviation present  No thyromegaly present  Cardiovascular: Normal rate and regular rhythm  Exam reveals no gallop and no friction rub  No murmur heard  Pulmonary/Chest: Effort normal and breath sounds normal  No respiratory distress  She has no wheezes  She has no rales  She exhibits no tenderness  Abdominal: Soft  Bowel sounds are normal  She exhibits no distension and no mass  There is no tenderness  There is no rebound and no guarding  Musculoskeletal: Normal range of motion  She exhibits no edema  Lymphadenopathy:     She has no cervical adenopathy  Neurological: She is alert and oriented to person, place, and time  Skin: Skin is warm and dry  No rash noted  She is not diaphoretic  No erythema  No pallor  Psychiatric: She has a normal mood and affect  Her behavior is normal  Judgment and thought content normal    Vitals reviewed  Goals and Barriers:  Current Goal: Minimize effects of disease  Barriers: None  Patient's Capacity to Self Care:  Patient is able to self care      Code Status: @[facility]@

## 2019-02-06 ENCOUNTER — TELEPHONE (OUTPATIENT)
Dept: HEMATOLOGY ONCOLOGY | Facility: CLINIC | Age: 69
End: 2019-02-06

## 2019-02-06 ENCOUNTER — APPOINTMENT (OUTPATIENT)
Dept: LAB | Facility: CLINIC | Age: 69
End: 2019-02-06
Payer: MEDICARE

## 2019-02-06 DIAGNOSIS — C34.90 MALIGNANT NEOPLASM OF LUNG, UNSPECIFIED LATERALITY, UNSPECIFIED PART OF LUNG (HCC): ICD-10-CM

## 2019-02-06 LAB
ALBUMIN SERPL BCP-MCNC: 3.1 G/DL (ref 3.5–5)
ALP SERPL-CCNC: 68 U/L (ref 46–116)
ALT SERPL W P-5'-P-CCNC: 44 U/L (ref 12–78)
ANION GAP SERPL CALCULATED.3IONS-SCNC: 8 MMOL/L (ref 4–13)
AST SERPL W P-5'-P-CCNC: 28 U/L (ref 5–45)
BASOPHILS # BLD AUTO: 0.01 THOUSANDS/ΜL (ref 0–0.1)
BASOPHILS NFR BLD AUTO: 0 % (ref 0–1)
BILIRUB SERPL-MCNC: 0.3 MG/DL (ref 0.2–1)
BUN SERPL-MCNC: 18 MG/DL (ref 5–25)
CALCIUM SERPL-MCNC: 9.1 MG/DL (ref 8.3–10.1)
CHLORIDE SERPL-SCNC: 106 MMOL/L (ref 100–108)
CO2 SERPL-SCNC: 29 MMOL/L (ref 21–32)
CREAT SERPL-MCNC: 0.82 MG/DL (ref 0.6–1.3)
EOSINOPHIL # BLD AUTO: 0.01 THOUSAND/ΜL (ref 0–0.61)
EOSINOPHIL NFR BLD AUTO: 0 % (ref 0–6)
ERYTHROCYTE [DISTWIDTH] IN BLOOD BY AUTOMATED COUNT: 19.7 % (ref 11.6–15.1)
GFR SERPL CREATININE-BSD FRML MDRD: 73 ML/MIN/1.73SQ M
GLUCOSE P FAST SERPL-MCNC: 141 MG/DL (ref 65–99)
HCT VFR BLD AUTO: 32.5 % (ref 34.8–46.1)
HGB BLD-MCNC: 10.7 G/DL (ref 11.5–15.4)
IMM GRANULOCYTES # BLD AUTO: 0.01 THOUSAND/UL (ref 0–0.2)
IMM GRANULOCYTES NFR BLD AUTO: 0 % (ref 0–2)
LYMPHOCYTES # BLD AUTO: 1.14 THOUSANDS/ΜL (ref 0.6–4.47)
LYMPHOCYTES NFR BLD AUTO: 34 % (ref 14–44)
MCH RBC QN AUTO: 29.7 PG (ref 26.8–34.3)
MCHC RBC AUTO-ENTMCNC: 32.9 G/DL (ref 31.4–37.4)
MCV RBC AUTO: 90 FL (ref 82–98)
MONOCYTES # BLD AUTO: 0.35 THOUSAND/ΜL (ref 0.17–1.22)
MONOCYTES NFR BLD AUTO: 10 % (ref 4–12)
NEUTROPHILS # BLD AUTO: 1.84 THOUSANDS/ΜL (ref 1.85–7.62)
NEUTS SEG NFR BLD AUTO: 56 % (ref 43–75)
NRBC BLD AUTO-RTO: 0 /100 WBCS
PLATELET # BLD AUTO: 145 THOUSANDS/UL (ref 149–390)
PMV BLD AUTO: 11.1 FL (ref 8.9–12.7)
POTASSIUM SERPL-SCNC: 3.9 MMOL/L (ref 3.5–5.3)
PROT SERPL-MCNC: 6.1 G/DL (ref 6.4–8.2)
RBC # BLD AUTO: 3.6 MILLION/UL (ref 3.81–5.12)
SODIUM SERPL-SCNC: 143 MMOL/L (ref 136–145)
WBC # BLD AUTO: 3.36 THOUSAND/UL (ref 4.31–10.16)

## 2019-02-06 PROCEDURE — 80053 COMPREHEN METABOLIC PANEL: CPT

## 2019-02-06 PROCEDURE — 85025 COMPLETE CBC W/AUTO DIFF WBC: CPT

## 2019-02-06 PROCEDURE — 36415 COLL VENOUS BLD VENIPUNCTURE: CPT

## 2019-02-06 NOTE — TELEPHONE ENCOUNTER
Called patient and informed her that her labs are okay for treatment on Friday   Patient verbalized understanding

## 2019-02-07 RX ORDER — SODIUM CHLORIDE 9 MG/ML
20 INJECTION, SOLUTION INTRAVENOUS CONTINUOUS
Status: DISCONTINUED | OUTPATIENT
Start: 2019-02-08 | End: 2019-02-11 | Stop reason: HOSPADM

## 2019-02-07 RX ORDER — CYANOCOBALAMIN 1000 UG/ML
1000 INJECTION INTRAMUSCULAR; SUBCUTANEOUS ONCE
Status: COMPLETED | OUTPATIENT
Start: 2019-02-08 | End: 2019-02-08

## 2019-02-08 ENCOUNTER — HOSPITAL ENCOUNTER (OUTPATIENT)
Dept: INFUSION CENTER | Facility: CLINIC | Age: 69
Discharge: HOME | End: 2019-02-08
Payer: MEDICARE

## 2019-02-08 VITALS
BODY MASS INDEX: 39.07 KG/M2 | HEIGHT: 63 IN | HEART RATE: 68 BPM | WEIGHT: 220.5 LBS | SYSTOLIC BLOOD PRESSURE: 130 MMHG | DIASTOLIC BLOOD PRESSURE: 62 MMHG | RESPIRATION RATE: 16 BRPM | OXYGEN SATURATION: 99 % | TEMPERATURE: 97.4 F

## 2019-02-08 PROCEDURE — 96413 CHEMO IV INFUSION 1 HR: CPT

## 2019-02-08 PROCEDURE — 96367 TX/PROPH/DG ADDL SEQ IV INF: CPT

## 2019-02-08 PROCEDURE — 96372 THER/PROPH/DIAG INJ SC/IM: CPT

## 2019-02-08 PROCEDURE — 96411 CHEMO IV PUSH ADDL DRUG: CPT

## 2019-02-08 RX ADMIN — SODIUM CHLORIDE 20 ML/HR: 0.9 INJECTION, SOLUTION INTRAVENOUS at 10:07

## 2019-02-08 RX ADMIN — CARBOPLATIN 619 MG: 10 INJECTION, SOLUTION INTRAVENOUS at 11:25

## 2019-02-08 RX ADMIN — CYANOCOBALAMIN 1000 MCG: 1000 INJECTION, SOLUTION INTRAMUSCULAR at 11:31

## 2019-02-08 RX ADMIN — SODIUM CHLORIDE 1000 MG: 9 INJECTION, SOLUTION INTRAVENOUS at 11:00

## 2019-02-08 RX ADMIN — ONDANSETRON 16 MG: 2 INJECTION INTRAMUSCULAR; INTRAVENOUS at 10:21

## 2019-02-12 DIAGNOSIS — I48.91 ATRIAL FIBRILLATION, UNSPECIFIED TYPE (HCC): ICD-10-CM

## 2019-02-18 ENCOUNTER — TELEPHONE (OUTPATIENT)
Dept: HEMATOLOGY ONCOLOGY | Facility: CLINIC | Age: 69
End: 2019-02-18

## 2019-02-18 ENCOUNTER — OFFICE VISIT (OUTPATIENT)
Dept: HEMATOLOGY ONCOLOGY | Facility: CLINIC | Age: 69
End: 2019-02-18
Payer: MEDICARE

## 2019-02-18 VITALS
HEIGHT: 63 IN | WEIGHT: 219 LBS | SYSTOLIC BLOOD PRESSURE: 138 MMHG | RESPIRATION RATE: 16 BRPM | TEMPERATURE: 96.2 F | HEART RATE: 78 BPM | DIASTOLIC BLOOD PRESSURE: 70 MMHG | BODY MASS INDEX: 38.8 KG/M2

## 2019-02-18 DIAGNOSIS — C34.92 ADENOCARCINOMA OF LEFT LUNG, STAGE 4 (HCC): Primary | ICD-10-CM

## 2019-02-18 DIAGNOSIS — C41.9 MALIGNANT NEOPLASM OF BONE WITH METASTASES (HCC): ICD-10-CM

## 2019-02-18 PROCEDURE — 99214 OFFICE O/P EST MOD 30 MIN: CPT | Performed by: PHYSICIAN ASSISTANT

## 2019-02-18 NOTE — PROGRESS NOTES
Hematology/Oncology Outpatient Follow- up Note  Sherwin Griffith 77 y o  female MRN: @ Encounter: [id number]        Date:  2/18/2019      Assessment / Plan:    Adenocarcinoma of the lung primary in the left upper lobe with involvement of the left scapula stage IV diagnosed initially on 08/2016, PDA expression more than 50%, she was treated with Pembrolizumab complicated with pneumonitis and later on she continued on nivolumab and we added prednisone 10 mg p o  Daily with excellent response until progression of disease in October 2018, she was found to have enlargement of the left scapular metastasis and increased pain in that area on the PET scan, no new lesions in the abdomen or pelvis     She received additional 10 courses of radiation therapy to the left scapula and was treated with Alimta 500 milligram/meter squared, carboplatin AUC 5 and vitamin B12  After 3 cycles, CT scan 1/2019 showed stable disease, decrease in the size of the left scapula bony lesion, no new lesions      Alimta 500 milligram/meter squared, carboplatin AUC 5 every 3 weeks continued  Continue prednisone 10 mg p o  Daily and start the day before Alimta and the day after Alimta for Decadron  Cycle #6 due 3/1/2019  We discussed discontinuation of carboplatin  Will arrange for F/U CT       Follow-up every 3 weeks with CBC, CMP               HPI:  Cait Ramsey was admitted to the hospital with arrhythmia and was found to have right lower lobe infiltrate in August 2016   She wasreated with antibiotics however repeat chest x-ray showed persistent right lower lobe infiltrate  Subsequently the patient had a CT scan of the chest which showed a right perihilar mass, subcarinal lymphadenopathy, lytic lesion of the right costovertebral junction at T10 level   PET scan October 2016 showed a right perihilar mass measuring 3 5 cm with SUV of 8 9, subcarinal lymph nodes measuring 3 4 x 2 2 cm with SUV of 9 2   Nodule in the left upper lobe lung measured 2 x 1 1 cm   There was a lytic lesion involving the right 10th costovertebral junction with SUV of 14  4      Biopsy showed non-small cell carcinoma with features suggesting of adenocarcinoma positive for CK 7, CK 19, CA-19-9, ANEUDY-3, partially positive for P40, p63, negative for TTF-1   She had a history of uterine cancer in 2000 status post hysterectomy and did not require radiation or chemotherapy   She is status post bilateral oophorectomy, right knee replacement,   tonsillectomy   She used to smoke for 35 years 1 pack per day however quit smoking 21 years ago   She used hormonal replacement therapy for 3 years  Gaetano Macedo has a family history significant for skin cancer in her father and coronary artery disease in mother  Gaetano Macedo has 2 healthy children      She developed pneumonitis with Pembrolizumab requiring discontinuation of therapy for 6 months    She had progression of disease in the left upper lobe as well as the scapula even though she received radiation therapy to the scapula area  Nivolumab 240 mg flat dose every 2 weeks along with prednisone 10 mg p o  Daily initiated April 2018- October 2018       Liquid biopsy showed K-MADHURI mutation G12C, no evidence of MSI high     Current Therapy:  11/6/2018: Alimta 500 milligram/meter squared, carboplatin AUC 5 every 3 weeks with Vitamin B12 1000 mcg IM shot every 3 weeks, multivitamin 1 tab p o  Daily     Previous Therapy:    1   Pembrolizumab 200 mg IV flat dose every 3 weeks initiated in December 2016, Finished in May 2017 secondary to grade 3 pneumonitis   2   radiation therapy to the left scapula in October 2017  3   Nivolumab 240 mg flat dose every 2 weeks initiated in April 2018 secondary to progression of the disease in the lung and the left scapula, prednisone 10 mg p o  daily to prevent pneumonitis  Nivolumab was continued every 2 weeks because of previous history of pneumonitis on Pembrolizumab    4   Additional 10 radiation tx to left scapula finished in November 2018        Interval History:  Some nausea x 5 days after chemo  Controlled with anti-emetic  Will be going on a cruise 4/13/2019 with her friend  Test Results:        Labs:   Lab Results   Component Value Date    HGB 10 7 (L) 02/06/2019    HCT 32 5 (L) 02/06/2019    MCV 90 02/06/2019     (L) 02/06/2019    WBC 3 36 (L) 02/06/2019    NRBC 0 02/06/2019     Lab Results   Component Value Date     11/23/2015    K 3 9 02/06/2019     02/06/2019    CO2 29 02/06/2019    ANIONGAP 9 11/23/2015    BUN 18 02/06/2019    CREATININE 0 82 02/06/2019    GLUCOSE 149 (H) 11/23/2015    GLUF 141 (H) 02/06/2019    CALCIUM 9 1 02/06/2019    AST 28 02/06/2019    ALT 44 02/06/2019    ALKPHOS 68 02/06/2019    PROT 6 8 11/23/2015    BILITOT 0 65 11/23/2015    EGFR 73 02/06/2019       Imaging: Ct Chest W Contrast    Result Date: 1/23/2019  Narrative: CT CHEST WITH IV CONTRAST INDICATION:   C34 92: Malignant neoplasm of unspecified part of left bronchus or lung  Vanessavis Franklyn Darling's note from 1/11/2019 was reviewed, which states patient has stage IV adenocarcinoma of the lung left scapular metastasis  Patient has had radiation therapy to the left scapula finished on 11/26/2018  COMPARISON:  Chest CT from 8/23/2018  TECHNIQUE: CT examination of the chest was performed  Axial, sagittal, and coronal 2D reformatted images were created from the source data and submitted for interpretation  Radiation dose length product (DLP) for this visit:  615 mGy-cm   This examination, like all CT scans performed in the [facility], was performed utilizing techniques to minimize radiation dose exposure, including the use of iterative reconstruction and automated exposure control   IV Contrast:  85 mL of iohexol (OMNIPAQUE) FINDINGS: LUNGS:  Unchanged part solid 2 7 x 1 3 cm left upper lobe pulmonary nodule (series 2 images 18 through 23 )  There is also unchanged, adjacent 4 mm left upper lobe solid pulmonary nodule (series 2 image 21 )  Unchanged 10 mm left lower lobe basilar pulmonary nodule (series 2 image 42 )  Unchanged right middle lobe scarring  No new pulmonary nodules  PLEURA:  Unremarkable  HEART/GREAT VESSELS:  Unremarkable for patient's age  MEDIASTINUM AND ANABEL:  Unremarkable  CHEST WALL AND LOWER NECK: There is a 1 2 cm right thyroid nodule  There are several additional subcentimeter thyroid nodules  Incidental discovery of one or more thyroid nodule(s) measuring less than 1 5 cm and without suspicious features is noted in this patient who is above 28years old; according to guidelines published in the February 2015 white paper on incidental thyroid nodules in the Journal of the [employer] of Radiology [facility]), no further evaluation is recommended  VISUALIZED STRUCTURES IN THE UPPER ABDOMEN:   Several too small to characterize hypodensities in the liver are unchanged  There are cholecystectomy clips  Unchanged 1 4 x 1 6 cm cystic lesion in the pancreatic body  (Series 2 image 59 ) OSSEOUS STRUCTURES: Decreased size of the lytic metastasis of the left scapula, currently 2 9 x 1 7 cm, previously 4 1 x 2 5 (series 2 image 15 )  There is also an unchanged lytic metastasis at the right costovertebral junction of T10 measuring approximately 1 7 x 1 2 cm (series 603 image 92 )     Impression: Compared to chest CT from 8/23/2018, unchanged part solid 2 7 x 1 3 cm left upper lobe pulmonary nodule consistent with patient's primary lung cancer  (series 2 images 18 through 23 ) Several additional small pulmonary nodules are also unchanged  Decreased size of the lytic metastasis of the left scapula, currently 2 9 x 1 7 cm, previously 4 1 x 2 5 (series 2 image 15 ) Unchanged lytic metastasis at the right costovertebral junction of T10 measuring approximately 1 7 x 1 2 cm (series 603 image 92 ) Unchanged 1 4 x 1 6 cm cystic lesion in the pancreatic body     (Series 2 image 59 ) Workstation performed: [id number]           ROS:  As mentioned in HPI & Interval History otherwise 14 point ROS negative  Allergies: Allergies   Allergen Reactions    Amoxicillin Rash    Cardizem [Diltiazem] Rash     Rash      Statins Myalgia     Severe muscle aching     Current Medications: Reviewed  PMH/FH/SH:  Reviewed      Physical Exam:    There is no height or weight on file to calculate BSA  Ht Readings from Last 3 Encounters:   02/08/19 5' 2 99" (1 6 m)   01/23/19 5' 3 39" (1 61 m)   01/18/19 5' 3 39" (1 61 m)        Wt Readings from Last 3 Encounters:   02/08/19 100 kg (220 lb 8 oz)   01/23/19 98 9 kg (218 lb 2 oz)   01/18/19 99 6 kg (219 lb 8 oz)        Temp Readings from Last 3 Encounters:   02/08/19 (!) 97 4 °F (36 3 °C) (Temporal)   01/23/19 97 8 °F (36 6 °C) (Oral)   01/18/19 97 8 °F (36 6 °C) (Oral)        BP Readings from Last 3 Encounters:   02/08/19 130/62   01/23/19 160/76   01/18/19 125/61           Physical Exam   Constitutional: She is oriented to person, place, and time  She appears well-developed and well-nourished  No distress  HENT:   Head: Normocephalic and atraumatic  Nose: Nose normal    Mouth/Throat: Oropharynx is clear and moist    Eyes: Pupils are equal, round, and reactive to light  Conjunctivae and EOM are normal  No scleral icterus  Neck: Normal range of motion  Neck supple  No tracheal deviation present  Cardiovascular: Normal rate, regular rhythm, normal heart sounds and intact distal pulses  Exam reveals no gallop and no friction rub  No murmur heard  Pulmonary/Chest: Effort normal and breath sounds normal  No stridor  No respiratory distress  She has no wheezes  She has no rales  She exhibits no tenderness  Abdominal: Soft  Bowel sounds are normal  She exhibits no distension and no mass  There is no tenderness  There is no rebound and no guarding  Musculoskeletal: Normal range of motion  She exhibits no edema, tenderness or deformity  Lymphadenopathy:     She has no cervical adenopathy  Neurological: She is alert and oriented to person, place, and time  No cranial nerve deficit  Coordination normal    Skin: Skin is warm and dry  No rash noted  She is not diaphoretic  No erythema  No pallor  Psychiatric: She has a normal mood and affect   Her behavior is normal  Judgment and thought content normal        ECOG:       Emergency Contacts:    Marino Crowder, ,

## 2019-02-18 NOTE — TELEPHONE ENCOUNTER
Spoke with Dr Herbie Maxwell and discussed with patient at office visit re:  CT imaging and carboplatin discontinuation  Dr Herbie Maxwell would like her to have CT prior to her next f/u  Order placed  Please call patient to let her know and to schedule

## 2019-02-19 NOTE — TELEPHONE ENCOUNTER
Scheduled CT last evening and left a message on patient's vm  PT returned my phone call this morning to confirm her CT appt

## 2019-02-25 DIAGNOSIS — I48.91 ATRIAL FIBRILLATION, UNSPECIFIED TYPE (HCC): ICD-10-CM

## 2019-02-25 RX ORDER — SOTALOL HYDROCHLORIDE 80 MG/1
TABLET ORAL
Qty: 180 TABLET | Refills: 3 | Status: SHIPPED | OUTPATIENT
Start: 2019-02-25 | End: 2020-02-24 | Stop reason: SDUPTHER

## 2019-02-27 ENCOUNTER — TELEPHONE (OUTPATIENT)
Dept: HEMATOLOGY ONCOLOGY | Facility: CLINIC | Age: 69
End: 2019-02-27

## 2019-02-27 ENCOUNTER — APPOINTMENT (OUTPATIENT)
Dept: LAB | Facility: CLINIC | Age: 69
End: 2019-02-27
Payer: MEDICARE

## 2019-02-27 ENCOUNTER — TRANSCRIBE ORDERS (OUTPATIENT)
Dept: LAB | Facility: CLINIC | Age: 69
End: 2019-02-27

## 2019-02-27 DIAGNOSIS — C34.92 ADENOCARCINOMA OF LEFT LUNG, STAGE 4 (HCC): ICD-10-CM

## 2019-02-27 LAB
ALBUMIN SERPL BCP-MCNC: 3.2 G/DL (ref 3.5–5)
ALP SERPL-CCNC: 110 U/L (ref 46–116)
ALT SERPL W P-5'-P-CCNC: 86 U/L (ref 12–78)
ANION GAP SERPL CALCULATED.3IONS-SCNC: 9 MMOL/L (ref 4–13)
AST SERPL W P-5'-P-CCNC: 35 U/L (ref 5–45)
BASOPHILS # BLD AUTO: 0.01 THOUSANDS/ΜL (ref 0–0.1)
BASOPHILS NFR BLD AUTO: 0 % (ref 0–1)
BILIRUB SERPL-MCNC: 0.4 MG/DL (ref 0.2–1)
BUN SERPL-MCNC: 18 MG/DL (ref 5–25)
CALCIUM SERPL-MCNC: 9.7 MG/DL (ref 8.3–10.1)
CHLORIDE SERPL-SCNC: 106 MMOL/L (ref 100–108)
CO2 SERPL-SCNC: 31 MMOL/L (ref 21–32)
CREAT SERPL-MCNC: 0.83 MG/DL (ref 0.6–1.3)
EOSINOPHIL # BLD AUTO: 0.01 THOUSAND/ΜL (ref 0–0.61)
EOSINOPHIL NFR BLD AUTO: 0 % (ref 0–6)
ERYTHROCYTE [DISTWIDTH] IN BLOOD BY AUTOMATED COUNT: 19.1 % (ref 11.6–15.1)
GFR SERPL CREATININE-BSD FRML MDRD: 72 ML/MIN/1.73SQ M
GLUCOSE P FAST SERPL-MCNC: 146 MG/DL (ref 65–99)
HCT VFR BLD AUTO: 32.3 % (ref 34.8–46.1)
HGB BLD-MCNC: 10.3 G/DL (ref 11.5–15.4)
IMM GRANULOCYTES # BLD AUTO: 0.02 THOUSAND/UL (ref 0–0.2)
IMM GRANULOCYTES NFR BLD AUTO: 1 % (ref 0–2)
LYMPHOCYTES # BLD AUTO: 1.07 THOUSANDS/ΜL (ref 0.6–4.47)
LYMPHOCYTES NFR BLD AUTO: 35 % (ref 14–44)
MCH RBC QN AUTO: 29.8 PG (ref 26.8–34.3)
MCHC RBC AUTO-ENTMCNC: 31.9 G/DL (ref 31.4–37.4)
MCV RBC AUTO: 93 FL (ref 82–98)
MONOCYTES # BLD AUTO: 0.42 THOUSAND/ΜL (ref 0.17–1.22)
MONOCYTES NFR BLD AUTO: 14 % (ref 4–12)
NEUTROPHILS # BLD AUTO: 1.49 THOUSANDS/ΜL (ref 1.85–7.62)
NEUTS SEG NFR BLD AUTO: 50 % (ref 43–75)
NRBC BLD AUTO-RTO: 0 /100 WBCS
PLATELET # BLD AUTO: 126 THOUSANDS/UL (ref 149–390)
PMV BLD AUTO: 10.8 FL (ref 8.9–12.7)
POTASSIUM SERPL-SCNC: 3.9 MMOL/L (ref 3.5–5.3)
PROT SERPL-MCNC: 6.4 G/DL (ref 6.4–8.2)
RBC # BLD AUTO: 3.46 MILLION/UL (ref 3.81–5.12)
SODIUM SERPL-SCNC: 146 MMOL/L (ref 136–145)
WBC # BLD AUTO: 3.02 THOUSAND/UL (ref 4.31–10.16)

## 2019-02-27 PROCEDURE — 85025 COMPLETE CBC W/AUTO DIFF WBC: CPT

## 2019-02-27 PROCEDURE — 80053 COMPREHEN METABOLIC PANEL: CPT

## 2019-02-27 PROCEDURE — 36415 COLL VENOUS BLD VENIPUNCTURE: CPT

## 2019-02-27 NOTE — TELEPHONE ENCOUNTER
Reviewed patient's labs  Called the patient to inform her that her labs are within the parameters for treatment  Patient verbalized understanding

## 2019-02-28 RX ORDER — CYANOCOBALAMIN 1000 UG/ML
1000 INJECTION INTRAMUSCULAR; SUBCUTANEOUS ONCE
Status: COMPLETED | OUTPATIENT
Start: 2019-03-01 | End: 2019-03-01

## 2019-02-28 RX ORDER — SODIUM CHLORIDE 9 MG/ML
20 INJECTION, SOLUTION INTRAVENOUS CONTINUOUS
Status: DISCONTINUED | OUTPATIENT
Start: 2019-03-01 | End: 2019-03-02 | Stop reason: HOSPADM

## 2019-03-01 ENCOUNTER — HOSPITAL ENCOUNTER (OUTPATIENT)
Dept: INFUSION CENTER | Facility: CLINIC | Age: 69
Discharge: HOME | End: 2019-03-01
Payer: MEDICARE

## 2019-03-01 VITALS
HEIGHT: 66 IN | WEIGHT: 267.64 LBS | SYSTOLIC BLOOD PRESSURE: 148 MMHG | RESPIRATION RATE: 18 BRPM | TEMPERATURE: 98.3 F | BODY MASS INDEX: 43.01 KG/M2 | HEART RATE: 72 BPM | DIASTOLIC BLOOD PRESSURE: 78 MMHG

## 2019-03-01 PROCEDURE — 96375 TX/PRO/DX INJ NEW DRUG ADDON: CPT

## 2019-03-01 PROCEDURE — 96411 CHEMO IV PUSH ADDL DRUG: CPT

## 2019-03-01 PROCEDURE — 96413 CHEMO IV INFUSION 1 HR: CPT

## 2019-03-01 PROCEDURE — 96372 THER/PROPH/DIAG INJ SC/IM: CPT

## 2019-03-01 RX ADMIN — CYANOCOBALAMIN 1000 MCG: 1000 INJECTION, SOLUTION INTRAMUSCULAR at 12:43

## 2019-03-01 RX ADMIN — SODIUM CHLORIDE 1000 MG: 9 INJECTION, SOLUTION INTRAVENOUS at 12:32

## 2019-03-01 RX ADMIN — SODIUM CHLORIDE 20 ML/HR: 0.9 INJECTION, SOLUTION INTRAVENOUS at 11:36

## 2019-03-01 RX ADMIN — ONDANSETRON 16 MG: 2 INJECTION INTRAMUSCULAR; INTRAVENOUS at 12:13

## 2019-03-01 RX ADMIN — CARBOPLATIN 613 MG: 10 INJECTION, SOLUTION INTRAVENOUS at 12:48

## 2019-03-01 NOTE — PROGRESS NOTES
Pt  tolerated treatment without incident, B12 admin  in [address]  AVS declined  Next appt  confirmed

## 2019-03-07 ENCOUNTER — HOSPITAL ENCOUNTER (OUTPATIENT)
Dept: CT IMAGING | Facility: HOSPITAL | Age: 69
Discharge: HOME | End: 2019-03-07
Payer: MEDICARE

## 2019-03-07 DIAGNOSIS — C34.92 ADENOCARCINOMA OF LEFT LUNG, STAGE 4 (HCC): ICD-10-CM

## 2019-03-07 PROCEDURE — 74177 CT ABD & PELVIS W/CONTRAST: CPT

## 2019-03-07 PROCEDURE — 71260 CT THORAX DX C+: CPT

## 2019-03-07 RX ADMIN — IOHEXOL 100 ML: 350 INJECTION, SOLUTION INTRAVENOUS at 08:41

## 2019-03-11 ENCOUNTER — OFFICE VISIT (OUTPATIENT)
Dept: HEMATOLOGY ONCOLOGY | Facility: CLINIC | Age: 69
End: 2019-03-11
Payer: MEDICARE

## 2019-03-11 VITALS
HEIGHT: 64 IN | DIASTOLIC BLOOD PRESSURE: 80 MMHG | RESPIRATION RATE: 16 BRPM | TEMPERATURE: 97.4 F | OXYGEN SATURATION: 98 % | SYSTOLIC BLOOD PRESSURE: 130 MMHG | BODY MASS INDEX: 37.39 KG/M2 | HEART RATE: 76 BPM | WEIGHT: 219 LBS

## 2019-03-11 DIAGNOSIS — C34.92 ADENOCARCINOMA OF LEFT LUNG, STAGE 4 (HCC): Primary | ICD-10-CM

## 2019-03-11 DIAGNOSIS — C41.9 MALIGNANT NEOPLASM OF BONE WITH METASTASES (HCC): ICD-10-CM

## 2019-03-11 PROCEDURE — 99214 OFFICE O/P EST MOD 30 MIN: CPT | Performed by: INTERNAL MEDICINE

## 2019-03-11 NOTE — PROGRESS NOTES
Hematology Outpatient Follow - Up Note  Amber Griffith 77 y o  female MRN: @ Encounter: [id number]        Date:  3/11/2019        Assessment/ Plan:  Adenocarcinoma of the lung primary in the left upper lobe of the lung with left scapula involvement stage IV diagnosed initially in 08/2016, PD L1 expression more than 50%, she was treated with Pembrolizumab complicated with pneumonitis and later on she continued on nivolumab with prednisone 10 mg p o  Daily with excellent response until progression of disease in October 2018 with enlargement of the left scapular metastasis and increased pain in that area on PET scan, no new lesions in the liver, abdomen or lymph nodes    She received 10 additional courses of radiation therapy to the left scapula and treated with Alimta 500 milligram/meter squared, carboplatin AUC 5 after 3 cycles CT scan in January 2019 showed stable disease  After finishing 6 cycles CT scan showed stable disease as well no new lesions    At this time will move to maintenance Alimta 500 milligram/meter squared every 3 weeks    Vitamin B12 1000 micro g every 9 weeks    Follow-up every 6 weeks    Patient have CBC, CMP prior to each chemotherapy           HPI: Rosi Maloney was admitted to the hospital with arrhythmia and was found to have right lower lobe infiltrate in August 2016   She wasreated with antibiotics however repeat chest x-ray showed persistent right lower lobe infiltrate  Subsequently the patient had a CT scan of the chest which showed a right perihilar mass, subcarinal lymphadenopathy, lytic lesion of the right costovertebral junction at T10 level   PET scan October 2016 showed a right perihilar mass measuring 3 5 cm with SUV of 8 9, subcarinal lymph nodes measuring 3 4 x 2 2 cm with SUV of 9 2  Nodule in the left upper lobe lung measured 2 x 1 1 cm   There was a lytic lesion involving the right 10th costovertebral junction with SUV of 14  4      Biopsy showed non-small cell carcinoma with features suggesting of adenocarcinoma positive for CK 7, CK 19, CA-19-9, ANEUDY-3, partially positive for P40, p63, negative for TTF-1   She had a history of uterine cancer in 2000 status post hysterectomy and did not require radiation or chemotherapy   She is status post bilateral oophorectomy, right knee replacement,   tonsillectomy   She used to smoke for 35 years 1 pack per day however quit smoking 21 years ago   She used hormonal replacement therapy for 3 years  Vidal Ca has a family history significant for skin cancer in her father and coronary artery disease in mother  Vidal Ca has 2 healthy children      She developed pneumonitis with Pembrolizumab requiring discontinuation of therapy for 6 months    She had progression of disease in the left upper lobe as well as the scapula even though she received radiation therapy to the scapula area  Nivolumab 240 mg flat dose every 2 weeks along with prednisone 10 mg p o  Daily initiated April 2018- October 2018       Liquid biopsy showed K-MADHURI mutation G12C, no evidence of MSI high     Current Therapy:  11/6/2018: Alimta 500 milligram/meter squared, carboplatin AUC 5 every 3 weeks with Vitamin B12 1000 mcg IM shot every 3 weeks, multivitamin 1 tab p o  Daily     Previous Therapy:    1   Pembrolizumab 200 mg IV flat dose every 3 weeks initiated in December 2016, Finished in May 2017 secondary to grade 3 pneumonitis   2   radiation therapy to the left scapula in October 2017  3   Nivolumab 240 mg flat dose every 2 weeks initiated in April 2018 secondary to progression of the disease in the lung and the left scapula, prednisone 10 mg p o  daily to prevent pneumonitis  Nivolumab was continued every 2 weeks because of previous history of pneumonitis on Pembrolizumab    4   Additional 10 radiation tx to left scapula finished in November 2018         Interval History:        Previous Treatment:         Test Results:    Imaging: Ct Chest Abdomen Pelvis W Contrast    Result Date: 3/8/2019  Narrative: CT CHEST, ABDOMEN AND PELVIS WITH IV CONTRAST INDICATION:   C34 92: Malignant neoplasm of unspecified part of left bronchus or lung  COMPARISON:  CT chest 1/20/2019, PET/CT 10/20/2018 TECHNIQUE: CT examination of the chest, abdomen and pelvis was performed  Axial, sagittal, and coronal 2D reformatted images were created from the source data and submitted for interpretation  Radiation dose length product (DLP) for this visit:  476 6970 mGy-cm   This examination, like all CT scans performed in the [facility], was performed utilizing techniques to minimize radiation dose exposure, including the use of iterative reconstruction and automated exposure control  IV Contrast:  100 mL of iohexol (OMNIPAQUE) Enteric Contrast: Enteric contrast was administered  FINDINGS: CHEST LUNGS:  Semisolid left upper lobe lesion is smaller now measuring 2 0 x 0 8 cm, image 44 series 4, previously 2 7 x 1 3 cm  Stable 4 mm nodule in the left upper lobe anterolaterally image 47 series 4  Stable 10 mm lobular nodule in the left lower lobe anteriorly image 92 series 4  Scattered subpleural patchy opacity noted in the left upper lobe and left lower lobe laterally may be related to posttreatment changes  Mild pleural parenchymal changes in the right upper lobe posteriorly adjacent to the major fissure, stable  PLEURA:  Unremarkable  HEART/GREAT VESSELS:  Unremarkable for patient's age  MEDIASTINUM AND ANABEL:  Small hiatal hernia noted  No mediastinal or hilar lymphadenopathy  CHEST WALL AND LOWER NECK: Multinodular thyroid gland  Stable 1 2 cm right thyroid nodule    Incidental discovery of one or more thyroid nodule(s) measuring less than 1 5 cm and without suspicious features is noted in this patient who is above 28years old; according to guidelines published in the February 2015 white paper on incidental thyroid nodules in the Journal of the [employer]iology [facility]), no further evaluation is recommended  ABDOMEN LIVER/BILIARY TREE:  One or more subcentimeter sharply circumscribed low-density hepatic lesion(s) are noted, too small to accurately characterize, but statistically most likely to represent subcentimeter hepatic cysts  No suspicious solid hepatic lesion is identified  Hepatic contours are normal   No biliary dilatation  GALLBLADDER:  Gallbladder is surgically absent  SPLEEN:  Unremarkable  PANCREAS:  1 8 x 1 7 cm pancreatic cystic lesion image 63 series 2  This is unchanged given differences in technique of measurement  ADRENAL GLANDS:  Mild nodularity of the left adrenal gland is stable  Right adrenal gland is unremarkable  KIDNEYS/URETERS:  No hydronephrosis  4 mm calculus in the right kidney lower pole  STOMACH AND BOWEL:  Scattered colonic diverticulosis  Otherwise unremarkable  APPENDIX:  No findings to suggest appendicitis  ABDOMINOPELVIC CAVITY:  No ascites or free intraperitoneal air  No lymphadenopathy  VESSELS:  Unremarkable for patient's age  PELVIS REPRODUCTIVE ORGANS:  Patient is status post hysterectomy  URINARY BLADDER:  Collapsed limiting evaluation  ABDOMINAL WALL/INGUINAL REGIONS:  Unremarkable  OSSEOUS STRUCTURES:  Stable appearance to the left scapular lesion measuring approximately 2 9 x 1 8 cm, previously 2 9 x 1 7 cm  Associated erosion of the left scapula  Stable patchy lytic and sclerotic lesion at the T10 costovertebral junction on the  right  No new suspicious osseous lesions  Patchy demineralization of the osseous structures  Impression: 1  Smaller left upper lobe pulmonary lesion which represents the primary malignancy  2  Additional small stable pulmonary nodular densities  3   New scattered subpleural patchy densities in the left lung which may be related to post treatment changes  4   Stable pancreatic cystic lesion  5   Stable findings for osseous metastasis to the left scapula and T10 vertebral body level  No new suspicious osseous lesions  Workstation performed: [id number]       Labs:   Lab Results   Component Value Date    WBC 3 02 (L) 02/27/2019    HGB 10 3 (L) 02/27/2019    HCT 32 3 (L) 02/27/2019    MCV 93 02/27/2019     (L) 02/27/2019     Lab Results   Component Value Date     11/23/2015    K 3 9 02/27/2019     02/27/2019    CO2 31 02/27/2019    ANIONGAP 9 11/23/2015    BUN 18 02/27/2019    CREATININE 0 83 02/27/2019    GLUCOSE 149 (H) 11/23/2015    GLUF 146 (H) 02/27/2019    CALCIUM 9 7 02/27/2019    AST 35 02/27/2019    ALT 86 (H) 02/27/2019    ALKPHOS 110 02/27/2019    PROT 6 8 11/23/2015    BILITOT 0 65 11/23/2015    EGFR 72 02/27/2019       No results found for: IRON, TIBC, FERRITIN    No results found for: [id number]      ROS:   Review of Systems   Constitutional: Negative for activity change, appetite change, diaphoresis, fatigue, fever and unexpected weight change  HENT: Negative for facial swelling, hearing loss, rhinorrhea, sinus pressure, sinus pain, sneezing, sore throat and tinnitus  Eyes: Negative for photophobia, pain, discharge, redness, itching and visual disturbance  Respiratory: Negative for apnea and chest tightness  Cardiovascular: Negative for chest pain, palpitations and leg swelling  Gastrointestinal: Negative for abdominal distention, abdominal pain, blood in stool, constipation, diarrhea, nausea, rectal pain and vomiting  Endocrine: Negative for cold intolerance, heat intolerance, polydipsia and polyphagia  Genitourinary: Negative for difficulty urinating, dyspareunia, frequency, hematuria, pelvic pain and urgency  Musculoskeletal: Negative for arthralgias, back pain, gait problem, joint swelling and myalgias  Skin: Negative for color change, pallor and rash  Allergic/Immunologic: Negative for environmental allergies and food allergies  Neurological: Negative for dizziness, tremors, seizures, syncope, speech difficulty, numbness and headaches     Hematological: Negative for adenopathy  Does not bruise/bleed easily  Psychiatric/Behavioral: Negative for agitation, confusion, dysphoric mood, hallucinations and suicidal ideas  ECOG score 0    Current Medications: Reviewed  Allergies: Reviewed  PMH/FH/SH:  Reviewed      Physical Exam:    Body surface area is 2 03 meters squared  Wt Readings from Last 3 Encounters:   03/11/19 99 3 kg (219 lb)   03/01/19 121 kg (267 lb 10 2 oz)   02/18/19 99 3 kg (219 lb)        Temp Readings from Last 3 Encounters:   03/11/19 (!) 97 4 °F (36 3 °C) (Tympanic)   03/01/19 98 3 °F (36 8 °C)   02/18/19 (!) 96 2 °F (35 7 °C) (Tympanic)        BP Readings from Last 3 Encounters:   03/11/19 130/80   03/01/19 148/78   02/18/19 138/70         Pulse Readings from Last 3 Encounters:   03/11/19 76   03/01/19 72   02/18/19 78        Physical Exam   Constitutional: She is oriented to person, place, and time  She appears well-developed and well-nourished  No distress  HENT:   Head: Normocephalic and atraumatic  Mouth/Throat: Oropharynx is clear and moist  No oropharyngeal exudate  Eyes: Pupils are equal, round, and reactive to light  Conjunctivae and EOM are normal    Neck: Normal range of motion  Neck supple  No tracheal deviation present  No thyromegaly present  Cardiovascular: Normal rate and regular rhythm  Exam reveals no gallop and no friction rub  No murmur heard  Pulmonary/Chest: Effort normal and breath sounds normal  No respiratory distress  She has no wheezes  She has no rales  She exhibits no tenderness  Abdominal: Soft  Bowel sounds are normal  She exhibits no distension and no mass  There is no tenderness  There is no rebound and no guarding  Musculoskeletal: Normal range of motion  She exhibits no edema  Lymphadenopathy:     She has no cervical adenopathy  Neurological: She is alert and oriented to person, place, and time  Skin: Skin is warm and dry  No rash noted  She is not diaphoretic  No erythema  No pallor     Psychiatric: She has a normal mood and affect  Her behavior is normal  Judgment and thought content normal    Vitals reviewed  Goals and Barriers:  Current Goal: Minimize effects of disease  Barriers: None  Patient's Capacity to Self Care:  Patient is able to self care      Code Status: [unfilled]

## 2019-03-19 ENCOUNTER — APPOINTMENT (OUTPATIENT)
Dept: LAB | Facility: CLINIC | Age: 69
End: 2019-03-19
Payer: MEDICARE

## 2019-03-19 DIAGNOSIS — E55.9 VITAMIN D DEFICIENCY: ICD-10-CM

## 2019-03-19 DIAGNOSIS — E11.9 TYPE 2 DIABETES MELLITUS WITHOUT COMPLICATIONS (HCC): ICD-10-CM

## 2019-03-19 DIAGNOSIS — I10 ESSENTIAL (PRIMARY) HYPERTENSION: ICD-10-CM

## 2019-03-19 DIAGNOSIS — C34.92 ADENOCARCINOMA OF LEFT LUNG, STAGE 4 (HCC): ICD-10-CM

## 2019-03-19 DIAGNOSIS — E78.2 MIXED HYPERLIPIDEMIA: ICD-10-CM

## 2019-03-19 DIAGNOSIS — E78.5 HYPERLIPIDEMIA: ICD-10-CM

## 2019-03-19 DIAGNOSIS — C41.9 MALIGNANT NEOPLASM OF BONE WITH METASTASES (HCC): ICD-10-CM

## 2019-03-19 DIAGNOSIS — E11.9 TYPE 2 DIABETES MELLITUS WITHOUT COMPLICATION, WITHOUT LONG-TERM CURRENT USE OF INSULIN (HCC): ICD-10-CM

## 2019-03-19 LAB
25(OH)D3 SERPL-MCNC: 53.5 NG/ML (ref 30–100)
ALBUMIN SERPL BCP-MCNC: 3.3 G/DL (ref 3.5–5)
ALP SERPL-CCNC: 76 U/L (ref 46–116)
ALT SERPL W P-5'-P-CCNC: 46 U/L (ref 12–78)
ANION GAP SERPL CALCULATED.3IONS-SCNC: 12 MMOL/L (ref 4–13)
AST SERPL W P-5'-P-CCNC: 31 U/L (ref 5–45)
BASOPHILS # BLD AUTO: 0 THOUSANDS/ΜL (ref 0–0.1)
BASOPHILS NFR BLD AUTO: 0 % (ref 0–1)
BILIRUB SERPL-MCNC: 0.5 MG/DL (ref 0.2–1)
BUN SERPL-MCNC: 15 MG/DL (ref 5–25)
CALCIUM SERPL-MCNC: 9.4 MG/DL (ref 8.3–10.1)
CHLORIDE SERPL-SCNC: 106 MMOL/L (ref 100–108)
CHOLEST SERPL-MCNC: 206 MG/DL (ref 50–200)
CO2 SERPL-SCNC: 26 MMOL/L (ref 21–32)
CREAT SERPL-MCNC: 0.98 MG/DL (ref 0.6–1.3)
EOSINOPHIL # BLD AUTO: 0 THOUSAND/ΜL (ref 0–0.61)
EOSINOPHIL NFR BLD AUTO: 0 % (ref 0–6)
ERYTHROCYTE [DISTWIDTH] IN BLOOD BY AUTOMATED COUNT: 18 % (ref 11.6–15.1)
EST. AVERAGE GLUCOSE BLD GHB EST-MCNC: 180 MG/DL
GFR SERPL CREATININE-BSD FRML MDRD: 59 ML/MIN/1.73SQ M
GLUCOSE P FAST SERPL-MCNC: 144 MG/DL (ref 65–99)
HBA1C MFR BLD: 7.9 % (ref 4.2–6.3)
HCT VFR BLD AUTO: 30.5 % (ref 34.8–46.1)
HDLC SERPL-MCNC: 44 MG/DL (ref 40–60)
HGB BLD-MCNC: 10.2 G/DL (ref 11.5–15.4)
IMM GRANULOCYTES # BLD AUTO: 0.01 THOUSAND/UL (ref 0–0.2)
IMM GRANULOCYTES NFR BLD AUTO: 0 % (ref 0–2)
LDLC SERPL CALC-MCNC: 131 MG/DL (ref 0–100)
LYMPHOCYTES # BLD AUTO: 0.62 THOUSANDS/ΜL (ref 0.6–4.47)
LYMPHOCYTES NFR BLD AUTO: 20 % (ref 14–44)
MCH RBC QN AUTO: 31.8 PG (ref 26.8–34.3)
MCHC RBC AUTO-ENTMCNC: 33.4 G/DL (ref 31.4–37.4)
MCV RBC AUTO: 95 FL (ref 82–98)
MONOCYTES # BLD AUTO: 0.36 THOUSAND/ΜL (ref 0.17–1.22)
MONOCYTES NFR BLD AUTO: 12 % (ref 4–12)
NEUTROPHILS # BLD AUTO: 2.09 THOUSANDS/ΜL (ref 1.85–7.62)
NEUTS SEG NFR BLD AUTO: 68 % (ref 43–75)
NRBC BLD AUTO-RTO: 0 /100 WBCS
PLATELET # BLD AUTO: 109 THOUSANDS/UL (ref 149–390)
PMV BLD AUTO: 11.4 FL (ref 8.9–12.7)
POTASSIUM SERPL-SCNC: 3.7 MMOL/L (ref 3.5–5.3)
PROT SERPL-MCNC: 6.6 G/DL (ref 6.4–8.2)
RBC # BLD AUTO: 3.21 MILLION/UL (ref 3.81–5.12)
SODIUM SERPL-SCNC: 144 MMOL/L (ref 136–145)
TRIGL SERPL-MCNC: 157 MG/DL
TSH SERPL DL<=0.05 MIU/L-ACNC: 2.32 UIU/ML (ref 0.36–3.74)
WBC # BLD AUTO: 3.08 THOUSAND/UL (ref 4.31–10.16)

## 2019-03-19 PROCEDURE — 80061 LIPID PANEL: CPT

## 2019-03-19 PROCEDURE — 80053 COMPREHEN METABOLIC PANEL: CPT

## 2019-03-19 PROCEDURE — 83036 HEMOGLOBIN GLYCOSYLATED A1C: CPT

## 2019-03-19 PROCEDURE — 82306 VITAMIN D 25 HYDROXY: CPT

## 2019-03-19 PROCEDURE — 85025 COMPLETE CBC W/AUTO DIFF WBC: CPT

## 2019-03-19 PROCEDURE — 36415 COLL VENOUS BLD VENIPUNCTURE: CPT

## 2019-03-19 PROCEDURE — 84443 ASSAY THYROID STIM HORMONE: CPT

## 2019-03-20 ENCOUNTER — TELEPHONE (OUTPATIENT)
Dept: HEMATOLOGY ONCOLOGY | Facility: CLINIC | Age: 69
End: 2019-03-20

## 2019-03-21 RX ORDER — SODIUM CHLORIDE 9 MG/ML
20 INJECTION, SOLUTION INTRAVENOUS CONTINUOUS
Status: DISCONTINUED | OUTPATIENT
Start: 2019-03-22 | End: 2019-03-23 | Stop reason: HOSPADM

## 2019-03-22 ENCOUNTER — HOSPITAL ENCOUNTER (OUTPATIENT)
Dept: INFUSION CENTER | Facility: CLINIC | Age: 69
Discharge: HOME | End: 2019-03-22
Payer: MEDICARE

## 2019-03-22 VITALS
HEART RATE: 78 BPM | WEIGHT: 220.5 LBS | BODY MASS INDEX: 36.74 KG/M2 | TEMPERATURE: 97.9 F | DIASTOLIC BLOOD PRESSURE: 68 MMHG | HEIGHT: 65 IN | RESPIRATION RATE: 18 BRPM | SYSTOLIC BLOOD PRESSURE: 142 MMHG

## 2019-03-22 PROCEDURE — 96367 TX/PROPH/DG ADDL SEQ IV INF: CPT

## 2019-03-22 PROCEDURE — 96409 CHEMO IV PUSH SNGL DRUG: CPT

## 2019-03-22 RX ADMIN — ONDANSETRON 16 MG: 2 INJECTION INTRAMUSCULAR; INTRAVENOUS at 10:24

## 2019-03-22 RX ADMIN — SODIUM CHLORIDE 1000 MG: 9 INJECTION, SOLUTION INTRAVENOUS at 10:57

## 2019-03-22 RX ADMIN — SODIUM CHLORIDE 20 ML/HR: 0.9 INJECTION, SOLUTION INTRAVENOUS at 10:15

## 2019-04-05 DIAGNOSIS — F41.9 ANXIETY: Primary | ICD-10-CM

## 2019-04-05 RX ORDER — VENLAFAXINE 37.5 MG/1
37.5 TABLET ORAL DAILY
Qty: 180 TABLET | Refills: 1 | Status: SHIPPED | OUTPATIENT
Start: 2019-04-05 | End: 2020-01-20 | Stop reason: SDUPTHER

## 2019-04-08 ENCOUNTER — OFFICE VISIT (OUTPATIENT)
Dept: PULMONOLOGY | Facility: CLINIC | Age: 69
End: 2019-04-08
Payer: MEDICARE

## 2019-04-08 VITALS
SYSTOLIC BLOOD PRESSURE: 140 MMHG | DIASTOLIC BLOOD PRESSURE: 78 MMHG | TEMPERATURE: 98 F | RESPIRATION RATE: 18 BRPM | HEART RATE: 80 BPM | WEIGHT: 219.6 LBS | OXYGEN SATURATION: 97 % | HEIGHT: 65 IN | BODY MASS INDEX: 36.59 KG/M2

## 2019-04-08 DIAGNOSIS — R91.8 LUNG NODULE, MULTIPLE: Primary | ICD-10-CM

## 2019-04-08 PROCEDURE — 99214 OFFICE O/P EST MOD 30 MIN: CPT | Performed by: INTERNAL MEDICINE

## 2019-04-08 RX ORDER — PREDNISONE 10 MG/1
5 TABLET ORAL EVERY OTHER DAY
COMMUNITY
End: 2020-02-17 | Stop reason: ALTCHOICE

## 2019-04-09 ENCOUNTER — TRANSCRIBE ORDERS (OUTPATIENT)
Dept: LAB | Facility: CLINIC | Age: 69
End: 2019-04-09

## 2019-04-09 ENCOUNTER — APPOINTMENT (OUTPATIENT)
Dept: LAB | Facility: CLINIC | Age: 69
End: 2019-04-09
Payer: MEDICARE

## 2019-04-09 LAB
ALBUMIN SERPL BCP-MCNC: 3.2 G/DL (ref 3.5–5)
ALP SERPL-CCNC: 78 U/L (ref 46–116)
ALT SERPL W P-5'-P-CCNC: 40 U/L (ref 12–78)
ANION GAP SERPL CALCULATED.3IONS-SCNC: 12 MMOL/L (ref 4–13)
AST SERPL W P-5'-P-CCNC: 30 U/L (ref 5–45)
BASOPHILS # BLD AUTO: 0.02 THOUSANDS/ΜL (ref 0–0.1)
BASOPHILS NFR BLD AUTO: 0 % (ref 0–1)
BILIRUB SERPL-MCNC: 0.6 MG/DL (ref 0.2–1)
BUN SERPL-MCNC: 14 MG/DL (ref 5–25)
CALCIUM SERPL-MCNC: 9.6 MG/DL (ref 8.3–10.1)
CHLORIDE SERPL-SCNC: 106 MMOL/L (ref 100–108)
CO2 SERPL-SCNC: 25 MMOL/L (ref 21–32)
CREAT SERPL-MCNC: 0.95 MG/DL (ref 0.6–1.3)
EOSINOPHIL # BLD AUTO: 0.02 THOUSAND/ΜL (ref 0–0.61)
EOSINOPHIL NFR BLD AUTO: 0 % (ref 0–6)
ERYTHROCYTE [DISTWIDTH] IN BLOOD BY AUTOMATED COUNT: 16.1 % (ref 11.6–15.1)
GFR SERPL CREATININE-BSD FRML MDRD: 61 ML/MIN/1.73SQ M
GLUCOSE SERPL-MCNC: 174 MG/DL (ref 65–140)
HCT VFR BLD AUTO: 30.7 % (ref 34.8–46.1)
HGB BLD-MCNC: 10.1 G/DL (ref 11.5–15.4)
IMM GRANULOCYTES # BLD AUTO: 0.03 THOUSAND/UL (ref 0–0.2)
IMM GRANULOCYTES NFR BLD AUTO: 1 % (ref 0–2)
LYMPHOCYTES # BLD AUTO: 0.63 THOUSANDS/ΜL (ref 0.6–4.47)
LYMPHOCYTES NFR BLD AUTO: 12 % (ref 14–44)
MCH RBC QN AUTO: 32.4 PG (ref 26.8–34.3)
MCHC RBC AUTO-ENTMCNC: 32.9 G/DL (ref 31.4–37.4)
MCV RBC AUTO: 98 FL (ref 82–98)
MONOCYTES # BLD AUTO: 0.6 THOUSAND/ΜL (ref 0.17–1.22)
MONOCYTES NFR BLD AUTO: 11 % (ref 4–12)
NEUTROPHILS # BLD AUTO: 4.01 THOUSANDS/ΜL (ref 1.85–7.62)
NEUTS SEG NFR BLD AUTO: 76 % (ref 43–75)
NRBC BLD AUTO-RTO: 0 /100 WBCS
PLATELET # BLD AUTO: 296 THOUSANDS/UL (ref 149–390)
PMV BLD AUTO: 10.8 FL (ref 8.9–12.7)
POTASSIUM SERPL-SCNC: 3.9 MMOL/L (ref 3.5–5.3)
PROT SERPL-MCNC: 6.7 G/DL (ref 6.4–8.2)
RBC # BLD AUTO: 3.12 MILLION/UL (ref 3.81–5.12)
SODIUM SERPL-SCNC: 143 MMOL/L (ref 136–145)
WBC # BLD AUTO: 5.31 THOUSAND/UL (ref 4.31–10.16)

## 2019-04-09 PROCEDURE — 85025 COMPLETE CBC W/AUTO DIFF WBC: CPT

## 2019-04-09 PROCEDURE — 80053 COMPREHEN METABOLIC PANEL: CPT

## 2019-04-09 PROCEDURE — 36415 COLL VENOUS BLD VENIPUNCTURE: CPT

## 2019-04-10 ENCOUNTER — TELEPHONE (OUTPATIENT)
Dept: HEMATOLOGY ONCOLOGY | Facility: CLINIC | Age: 69
End: 2019-04-10

## 2019-04-11 RX ORDER — SODIUM CHLORIDE 9 MG/ML
20 INJECTION, SOLUTION INTRAVENOUS CONTINUOUS
Status: DISCONTINUED | OUTPATIENT
Start: 2019-04-12 | End: 2019-04-13 | Stop reason: HOSPADM

## 2019-04-11 RX ORDER — CYANOCOBALAMIN 1000 UG/ML
1000 INJECTION INTRAMUSCULAR; SUBCUTANEOUS ONCE
Status: DISCONTINUED | OUTPATIENT
Start: 2019-04-12 | End: 2019-04-11

## 2019-04-12 ENCOUNTER — HOSPITAL ENCOUNTER (OUTPATIENT)
Dept: INFUSION CENTER | Facility: CLINIC | Age: 69
Discharge: HOME | End: 2019-04-12
Payer: MEDICARE

## 2019-04-12 VITALS
WEIGHT: 220 LBS | RESPIRATION RATE: 18 BRPM | DIASTOLIC BLOOD PRESSURE: 63 MMHG | TEMPERATURE: 98 F | SYSTOLIC BLOOD PRESSURE: 134 MMHG | HEART RATE: 80 BPM | HEIGHT: 64 IN | BODY MASS INDEX: 37.56 KG/M2

## 2019-04-12 PROCEDURE — 96409 CHEMO IV PUSH SNGL DRUG: CPT

## 2019-04-12 PROCEDURE — 96367 TX/PROPH/DG ADDL SEQ IV INF: CPT

## 2019-04-12 RX ADMIN — SODIUM CHLORIDE 20 ML/HR: 0.9 INJECTION, SOLUTION INTRAVENOUS at 09:11

## 2019-04-12 RX ADMIN — ONDANSETRON 16 MG: 2 INJECTION INTRAMUSCULAR; INTRAVENOUS at 09:42

## 2019-04-12 RX ADMIN — SODIUM CHLORIDE 1000 MG: 9 INJECTION, SOLUTION INTRAVENOUS at 10:14

## 2019-04-12 NOTE — PROGRESS NOTES
Tolerated infusion w/out any adverse effects  Labs w/in parameters, offers no complaints   B12 inj held today

## 2019-04-15 DIAGNOSIS — C34.92 ADENOCARCINOMA OF LEFT LUNG, STAGE 4 (HCC): ICD-10-CM

## 2019-04-15 RX ORDER — SODIUM CHLORIDE 9 MG/ML
20 INJECTION, SOLUTION INTRAVENOUS ONCE
Status: CANCELLED | OUTPATIENT
Start: 2019-05-24

## 2019-04-18 ENCOUNTER — TELEPHONE (OUTPATIENT)
Dept: PALLIATIVE MEDICINE | Facility: CLINIC | Age: 69
End: 2019-04-18

## 2019-04-23 DIAGNOSIS — C34.92 ADENOCARCINOMA OF LEFT LUNG, STAGE 4 (HCC): ICD-10-CM

## 2019-04-23 RX ORDER — PREDNISONE 10 MG/1
10 TABLET ORAL DAILY
Qty: 90 TABLET | Refills: 1 | Status: SHIPPED | OUTPATIENT
Start: 2019-04-23 | End: 2019-10-02 | Stop reason: SDUPTHER

## 2019-04-30 ENCOUNTER — APPOINTMENT (OUTPATIENT)
Dept: LAB | Facility: CLINIC | Age: 69
End: 2019-04-30
Payer: MEDICARE

## 2019-04-30 DIAGNOSIS — C34.92 ADENOCARCINOMA OF LEFT LUNG, STAGE 4 (HCC): ICD-10-CM

## 2019-04-30 LAB
ALBUMIN SERPL BCP-MCNC: 3 G/DL (ref 3.5–5)
ALP SERPL-CCNC: 78 U/L (ref 46–116)
ALT SERPL W P-5'-P-CCNC: 39 U/L (ref 12–78)
ANION GAP SERPL CALCULATED.3IONS-SCNC: 11 MMOL/L (ref 4–13)
AST SERPL W P-5'-P-CCNC: 27 U/L (ref 5–45)
BASOPHILS # BLD AUTO: 0.01 THOUSANDS/ΜL (ref 0–0.1)
BASOPHILS NFR BLD AUTO: 0 % (ref 0–1)
BILIRUB SERPL-MCNC: 0.4 MG/DL (ref 0.2–1)
BUN SERPL-MCNC: 17 MG/DL (ref 5–25)
CALCIUM SERPL-MCNC: 9.5 MG/DL (ref 8.3–10.1)
CHLORIDE SERPL-SCNC: 106 MMOL/L (ref 100–108)
CO2 SERPL-SCNC: 26 MMOL/L (ref 21–32)
CREAT SERPL-MCNC: 1.01 MG/DL (ref 0.6–1.3)
CREAT UR-MCNC: 74.9 MG/DL
EOSINOPHIL # BLD AUTO: 0.05 THOUSAND/ΜL (ref 0–0.61)
EOSINOPHIL NFR BLD AUTO: 1 % (ref 0–6)
ERYTHROCYTE [DISTWIDTH] IN BLOOD BY AUTOMATED COUNT: 14.7 % (ref 11.6–15.1)
GFR SERPL CREATININE-BSD FRML MDRD: 57 ML/MIN/1.73SQ M
GLUCOSE P FAST SERPL-MCNC: 178 MG/DL (ref 65–99)
HCT VFR BLD AUTO: 32 % (ref 34.8–46.1)
HGB BLD-MCNC: 10.5 G/DL (ref 11.5–15.4)
IMM GRANULOCYTES # BLD AUTO: 0.03 THOUSAND/UL (ref 0–0.2)
IMM GRANULOCYTES NFR BLD AUTO: 1 % (ref 0–2)
LYMPHOCYTES # BLD AUTO: 0.67 THOUSANDS/ΜL (ref 0.6–4.47)
LYMPHOCYTES NFR BLD AUTO: 12 % (ref 14–44)
MCH RBC QN AUTO: 32.3 PG (ref 26.8–34.3)
MCHC RBC AUTO-ENTMCNC: 32.8 G/DL (ref 31.4–37.4)
MCV RBC AUTO: 99 FL (ref 82–98)
MICROALBUMIN UR-MCNC: 5.8 MG/L (ref 0–20)
MICROALBUMIN/CREAT 24H UR: 8 MG/G CREATININE (ref 0–30)
MONOCYTES # BLD AUTO: 0.61 THOUSAND/ΜL (ref 0.17–1.22)
MONOCYTES NFR BLD AUTO: 11 % (ref 4–12)
NEUTROPHILS # BLD AUTO: 4.29 THOUSANDS/ΜL (ref 1.85–7.62)
NEUTS SEG NFR BLD AUTO: 75 % (ref 43–75)
NRBC BLD AUTO-RTO: 0 /100 WBCS
PLATELET # BLD AUTO: 251 THOUSANDS/UL (ref 149–390)
PMV BLD AUTO: 11.1 FL (ref 8.9–12.7)
POTASSIUM SERPL-SCNC: 3.7 MMOL/L (ref 3.5–5.3)
PROT SERPL-MCNC: 6.5 G/DL (ref 6.4–8.2)
RBC # BLD AUTO: 3.25 MILLION/UL (ref 3.81–5.12)
SODIUM SERPL-SCNC: 143 MMOL/L (ref 136–145)
WBC # BLD AUTO: 5.66 THOUSAND/UL (ref 4.31–10.16)

## 2019-04-30 PROCEDURE — 36415 COLL VENOUS BLD VENIPUNCTURE: CPT

## 2019-04-30 PROCEDURE — 82570 ASSAY OF URINE CREATININE: CPT

## 2019-04-30 PROCEDURE — 85025 COMPLETE CBC W/AUTO DIFF WBC: CPT

## 2019-04-30 PROCEDURE — 82043 UR ALBUMIN QUANTITATIVE: CPT

## 2019-04-30 PROCEDURE — 80053 COMPREHEN METABOLIC PANEL: CPT

## 2019-05-02 ENCOUNTER — TELEPHONE (OUTPATIENT)
Dept: HEMATOLOGY ONCOLOGY | Facility: CLINIC | Age: 69
End: 2019-05-02

## 2019-05-02 DIAGNOSIS — C34.92 ADENOCARCINOMA OF LEFT LUNG, STAGE 4 (HCC): ICD-10-CM

## 2019-05-02 RX ORDER — CYANOCOBALAMIN 1000 UG/ML
1000 INJECTION INTRAMUSCULAR; SUBCUTANEOUS ONCE
Status: COMPLETED | OUTPATIENT
Start: 2019-05-03 | End: 2019-05-03

## 2019-05-02 RX ORDER — SODIUM CHLORIDE 9 MG/ML
20 INJECTION, SOLUTION INTRAVENOUS CONTINUOUS
Status: DISCONTINUED | OUTPATIENT
Start: 2019-05-03 | End: 2019-05-04 | Stop reason: HOSPADM

## 2019-05-03 ENCOUNTER — HOSPITAL ENCOUNTER (OUTPATIENT)
Dept: INFUSION CENTER | Facility: CLINIC | Age: 69
Discharge: HOME | End: 2019-05-03
Payer: MEDICARE

## 2019-05-03 VITALS
TEMPERATURE: 98.1 F | WEIGHT: 220.9 LBS | BODY MASS INDEX: 37.71 KG/M2 | HEIGHT: 64 IN | HEART RATE: 76 BPM | DIASTOLIC BLOOD PRESSURE: 72 MMHG | SYSTOLIC BLOOD PRESSURE: 166 MMHG | RESPIRATION RATE: 20 BRPM | OXYGEN SATURATION: 96 %

## 2019-05-03 PROCEDURE — 96372 THER/PROPH/DIAG INJ SC/IM: CPT

## 2019-05-03 PROCEDURE — 96367 TX/PROPH/DG ADDL SEQ IV INF: CPT

## 2019-05-03 PROCEDURE — 96409 CHEMO IV PUSH SNGL DRUG: CPT

## 2019-05-03 RX ORDER — DEXAMETHASONE 4 MG/1
TABLET ORAL
Qty: 30 TABLET | Refills: 1 | Status: SHIPPED | OUTPATIENT
Start: 2019-05-03 | End: 2019-12-19 | Stop reason: SDUPTHER

## 2019-05-03 RX ADMIN — CYANOCOBALAMIN 1000 MCG: 1000 INJECTION, SOLUTION INTRAMUSCULAR at 11:42

## 2019-05-03 RX ADMIN — ONDANSETRON 16 MG: 2 INJECTION INTRAMUSCULAR; INTRAVENOUS at 10:31

## 2019-05-03 RX ADMIN — SODIUM CHLORIDE 1000 MG: 9 INJECTION, SOLUTION INTRAVENOUS at 11:21

## 2019-05-03 RX ADMIN — SODIUM CHLORIDE 20 ML/HR: 0.9 INJECTION, SOLUTION INTRAVENOUS at 10:20

## 2019-05-06 ENCOUNTER — OFFICE VISIT (OUTPATIENT)
Dept: HEMATOLOGY ONCOLOGY | Facility: CLINIC | Age: 69
End: 2019-05-06
Payer: MEDICARE

## 2019-05-06 VITALS
RESPIRATION RATE: 14 BRPM | HEART RATE: 80 BPM | DIASTOLIC BLOOD PRESSURE: 70 MMHG | TEMPERATURE: 97.8 F | SYSTOLIC BLOOD PRESSURE: 128 MMHG | BODY MASS INDEX: 37.9 KG/M2 | WEIGHT: 222 LBS | HEIGHT: 64 IN

## 2019-05-06 DIAGNOSIS — C34.92 ADENOCARCINOMA OF LEFT LUNG, STAGE 4 (HCC): Primary | ICD-10-CM

## 2019-05-06 PROCEDURE — 99213 OFFICE O/P EST LOW 20 MIN: CPT | Performed by: PHYSICIAN ASSISTANT

## 2019-05-09 LAB
LEFT EYE DIABETIC RETINOPATHY: NORMAL
RIGHT EYE DIABETIC RETINOPATHY: NORMAL
SEVERITY (EYE EXAM): NORMAL

## 2019-05-14 DIAGNOSIS — C34.92 ADENOCARCINOMA OF LEFT LUNG, STAGE 4 (HCC): ICD-10-CM

## 2019-05-14 RX ORDER — SODIUM CHLORIDE 9 MG/ML
20 INJECTION, SOLUTION INTRAVENOUS ONCE
Status: CANCELLED | OUTPATIENT
Start: 2019-06-14

## 2019-05-21 ENCOUNTER — APPOINTMENT (OUTPATIENT)
Dept: LAB | Facility: CLINIC | Age: 69
End: 2019-05-21
Payer: MEDICARE

## 2019-05-21 ENCOUNTER — TRANSCRIBE ORDERS (OUTPATIENT)
Dept: LAB | Facility: CLINIC | Age: 69
End: 2019-05-21

## 2019-05-21 LAB
ALBUMIN SERPL BCP-MCNC: 3 G/DL (ref 3.5–5)
ALP SERPL-CCNC: 74 U/L (ref 46–116)
ALT SERPL W P-5'-P-CCNC: 58 U/L (ref 12–78)
ANION GAP SERPL CALCULATED.3IONS-SCNC: 9 MMOL/L (ref 4–13)
AST SERPL W P-5'-P-CCNC: 38 U/L (ref 5–45)
BASOPHILS # BLD AUTO: 0.02 THOUSANDS/ΜL (ref 0–0.1)
BASOPHILS NFR BLD AUTO: 0 % (ref 0–1)
BILIRUB SERPL-MCNC: 0.4 MG/DL (ref 0.2–1)
BUN SERPL-MCNC: 16 MG/DL (ref 5–25)
CALCIUM SERPL-MCNC: 9.2 MG/DL (ref 8.3–10.1)
CHLORIDE SERPL-SCNC: 107 MMOL/L (ref 100–108)
CO2 SERPL-SCNC: 27 MMOL/L (ref 21–32)
CREAT SERPL-MCNC: 1.02 MG/DL (ref 0.6–1.3)
EOSINOPHIL # BLD AUTO: 0.06 THOUSAND/ΜL (ref 0–0.61)
EOSINOPHIL NFR BLD AUTO: 1 % (ref 0–6)
ERYTHROCYTE [DISTWIDTH] IN BLOOD BY AUTOMATED COUNT: 14.5 % (ref 11.6–15.1)
GFR SERPL CREATININE-BSD FRML MDRD: 56 ML/MIN/1.73SQ M
GLUCOSE P FAST SERPL-MCNC: 147 MG/DL (ref 65–99)
HCT VFR BLD AUTO: 33 % (ref 34.8–46.1)
HGB BLD-MCNC: 10.7 G/DL (ref 11.5–15.4)
IMM GRANULOCYTES # BLD AUTO: 0.03 THOUSAND/UL (ref 0–0.2)
IMM GRANULOCYTES NFR BLD AUTO: 1 % (ref 0–2)
LYMPHOCYTES # BLD AUTO: 0.9 THOUSANDS/ΜL (ref 0.6–4.47)
LYMPHOCYTES NFR BLD AUTO: 18 % (ref 14–44)
MCH RBC QN AUTO: 31.6 PG (ref 26.8–34.3)
MCHC RBC AUTO-ENTMCNC: 32.4 G/DL (ref 31.4–37.4)
MCV RBC AUTO: 97 FL (ref 82–98)
MONOCYTES # BLD AUTO: 0.5 THOUSAND/ΜL (ref 0.17–1.22)
MONOCYTES NFR BLD AUTO: 10 % (ref 4–12)
NEUTROPHILS # BLD AUTO: 3.45 THOUSANDS/ΜL (ref 1.85–7.62)
NEUTS SEG NFR BLD AUTO: 70 % (ref 43–75)
NRBC BLD AUTO-RTO: 0 /100 WBCS
PLATELET # BLD AUTO: 212 THOUSANDS/UL (ref 149–390)
PMV BLD AUTO: 10.5 FL (ref 8.9–12.7)
POTASSIUM SERPL-SCNC: 3.7 MMOL/L (ref 3.5–5.3)
PROT SERPL-MCNC: 6.2 G/DL (ref 6.4–8.2)
RBC # BLD AUTO: 3.39 MILLION/UL (ref 3.81–5.12)
SODIUM SERPL-SCNC: 143 MMOL/L (ref 136–145)
WBC # BLD AUTO: 4.96 THOUSAND/UL (ref 4.31–10.16)

## 2019-05-21 PROCEDURE — 85025 COMPLETE CBC W/AUTO DIFF WBC: CPT | Performed by: PHYSICIAN ASSISTANT

## 2019-05-21 PROCEDURE — 80053 COMPREHEN METABOLIC PANEL: CPT | Performed by: PHYSICIAN ASSISTANT

## 2019-05-21 PROCEDURE — 36415 COLL VENOUS BLD VENIPUNCTURE: CPT | Performed by: PHYSICIAN ASSISTANT

## 2019-05-22 ENCOUNTER — TELEPHONE (OUTPATIENT)
Dept: HEMATOLOGY ONCOLOGY | Facility: CLINIC | Age: 69
End: 2019-05-22

## 2019-05-24 ENCOUNTER — HOSPITAL ENCOUNTER (OUTPATIENT)
Dept: INFUSION CENTER | Facility: CLINIC | Age: 69
Discharge: HOME | End: 2019-05-24
Payer: MEDICARE

## 2019-05-24 VITALS
RESPIRATION RATE: 20 BRPM | DIASTOLIC BLOOD PRESSURE: 70 MMHG | WEIGHT: 222.22 LBS | HEIGHT: 64 IN | BODY MASS INDEX: 37.94 KG/M2 | SYSTOLIC BLOOD PRESSURE: 140 MMHG | HEART RATE: 76 BPM | TEMPERATURE: 97.8 F

## 2019-05-24 DIAGNOSIS — C34.92 ADENOCARCINOMA OF LEFT LUNG, STAGE 4 (HCC): Primary | ICD-10-CM

## 2019-05-24 PROCEDURE — 96367 TX/PROPH/DG ADDL SEQ IV INF: CPT

## 2019-05-24 PROCEDURE — 96409 CHEMO IV PUSH SNGL DRUG: CPT

## 2019-05-24 RX ORDER — SODIUM CHLORIDE 9 MG/ML
20 INJECTION, SOLUTION INTRAVENOUS ONCE
Status: COMPLETED | OUTPATIENT
Start: 2019-05-24 | End: 2019-05-24

## 2019-05-24 RX ADMIN — SODIUM CHLORIDE 1020 MG: 9 INJECTION, SOLUTION INTRAVENOUS at 12:29

## 2019-05-24 RX ADMIN — SODIUM CHLORIDE 20 ML/HR: 0.9 INJECTION, SOLUTION INTRAVENOUS at 11:55

## 2019-05-24 RX ADMIN — ONDANSETRON 16 MG: 2 INJECTION INTRAMUSCULAR; INTRAVENOUS at 12:00

## 2019-06-06 ENCOUNTER — OFFICE VISIT (OUTPATIENT)
Dept: CARDIOLOGY CLINIC | Facility: CLINIC | Age: 69
End: 2019-06-06
Payer: MEDICARE

## 2019-06-06 VITALS
OXYGEN SATURATION: 98 % | WEIGHT: 221 LBS | HEIGHT: 64 IN | HEART RATE: 72 BPM | DIASTOLIC BLOOD PRESSURE: 80 MMHG | SYSTOLIC BLOOD PRESSURE: 138 MMHG | BODY MASS INDEX: 37.73 KG/M2

## 2019-06-06 DIAGNOSIS — E78.5 DYSLIPIDEMIA: ICD-10-CM

## 2019-06-06 DIAGNOSIS — C34.92 ADENOCARCINOMA OF LEFT LUNG, STAGE 4 (HCC): ICD-10-CM

## 2019-06-06 DIAGNOSIS — I10 BENIGN ESSENTIAL HYPERTENSION: ICD-10-CM

## 2019-06-06 DIAGNOSIS — I48.91 ATRIAL FIBRILLATION, UNSPECIFIED TYPE (HCC): Primary | ICD-10-CM

## 2019-06-06 PROCEDURE — 99214 OFFICE O/P EST MOD 30 MIN: CPT | Performed by: INTERNAL MEDICINE

## 2019-06-06 PROCEDURE — 93000 ELECTROCARDIOGRAM COMPLETE: CPT | Performed by: INTERNAL MEDICINE

## 2019-06-06 RX ORDER — EZETIMIBE 10 MG/1
10 TABLET ORAL DAILY
Qty: 90 TABLET | Refills: 3 | Status: SHIPPED | OUTPATIENT
Start: 2019-06-06 | End: 2019-10-02 | Stop reason: SINTOL

## 2019-06-06 RX ORDER — EZETIMIBE 10 MG/1
10 TABLET ORAL DAILY
Qty: 90 TABLET | Refills: 3 | Status: SHIPPED | OUTPATIENT
Start: 2019-06-06 | End: 2019-06-06 | Stop reason: SDUPTHER

## 2019-06-12 ENCOUNTER — APPOINTMENT (OUTPATIENT)
Dept: LAB | Facility: CLINIC | Age: 69
End: 2019-06-12
Payer: MEDICARE

## 2019-06-12 ENCOUNTER — HOSPITAL ENCOUNTER (OUTPATIENT)
Dept: CT IMAGING | Facility: HOSPITAL | Age: 69
Discharge: HOME | End: 2019-06-12
Payer: MEDICARE

## 2019-06-12 DIAGNOSIS — C34.92 ADENOCARCINOMA OF LEFT LUNG, STAGE 4 (HCC): ICD-10-CM

## 2019-06-12 LAB
ALBUMIN SERPL BCP-MCNC: 3.1 G/DL (ref 3.5–5)
ALP SERPL-CCNC: 71 U/L (ref 46–116)
ALT SERPL W P-5'-P-CCNC: 48 U/L (ref 12–78)
ANION GAP SERPL CALCULATED.3IONS-SCNC: 7 MMOL/L (ref 4–13)
AST SERPL W P-5'-P-CCNC: 32 U/L (ref 5–45)
BASOPHILS # BLD AUTO: 0.02 THOUSANDS/ΜL (ref 0–0.1)
BASOPHILS NFR BLD AUTO: 1 % (ref 0–1)
BILIRUB SERPL-MCNC: 0.4 MG/DL (ref 0.2–1)
BUN SERPL-MCNC: 17 MG/DL (ref 5–25)
CALCIUM SERPL-MCNC: 9.2 MG/DL (ref 8.3–10.1)
CHLORIDE SERPL-SCNC: 106 MMOL/L (ref 100–108)
CO2 SERPL-SCNC: 28 MMOL/L (ref 21–32)
CREAT SERPL-MCNC: 1 MG/DL (ref 0.6–1.3)
EOSINOPHIL # BLD AUTO: 0.05 THOUSAND/ΜL (ref 0–0.61)
EOSINOPHIL NFR BLD AUTO: 1 % (ref 0–6)
ERYTHROCYTE [DISTWIDTH] IN BLOOD BY AUTOMATED COUNT: 13.9 % (ref 11.6–15.1)
GFR SERPL CREATININE-BSD FRML MDRD: 57 ML/MIN/1.73SQ M
GLUCOSE P FAST SERPL-MCNC: 151 MG/DL (ref 65–99)
HCT VFR BLD AUTO: 34.6 % (ref 34.8–46.1)
HGB BLD-MCNC: 11.1 G/DL (ref 11.5–15.4)
IMM GRANULOCYTES # BLD AUTO: 0.01 THOUSAND/UL (ref 0–0.2)
IMM GRANULOCYTES NFR BLD AUTO: 0 % (ref 0–2)
LYMPHOCYTES # BLD AUTO: 0.83 THOUSANDS/ΜL (ref 0.6–4.47)
LYMPHOCYTES NFR BLD AUTO: 20 % (ref 14–44)
MCH RBC QN AUTO: 30.7 PG (ref 26.8–34.3)
MCHC RBC AUTO-ENTMCNC: 32.1 G/DL (ref 31.4–37.4)
MCV RBC AUTO: 96 FL (ref 82–98)
MONOCYTES # BLD AUTO: 0.49 THOUSAND/ΜL (ref 0.17–1.22)
MONOCYTES NFR BLD AUTO: 12 % (ref 4–12)
NEUTROPHILS # BLD AUTO: 2.76 THOUSANDS/ΜL (ref 1.85–7.62)
NEUTS SEG NFR BLD AUTO: 66 % (ref 43–75)
NRBC BLD AUTO-RTO: 0 /100 WBCS
PLATELET # BLD AUTO: 212 THOUSANDS/UL (ref 149–390)
PMV BLD AUTO: 10.8 FL (ref 8.9–12.7)
POTASSIUM SERPL-SCNC: 3.9 MMOL/L (ref 3.5–5.3)
PROT SERPL-MCNC: 6.2 G/DL (ref 6.4–8.2)
RBC # BLD AUTO: 3.62 MILLION/UL (ref 3.81–5.12)
SODIUM SERPL-SCNC: 141 MMOL/L (ref 136–145)
WBC # BLD AUTO: 4.16 THOUSAND/UL (ref 4.31–10.16)

## 2019-06-12 PROCEDURE — 71260 CT THORAX DX C+: CPT

## 2019-06-12 PROCEDURE — 85025 COMPLETE CBC W/AUTO DIFF WBC: CPT

## 2019-06-12 PROCEDURE — 80053 COMPREHEN METABOLIC PANEL: CPT

## 2019-06-12 PROCEDURE — 36415 COLL VENOUS BLD VENIPUNCTURE: CPT

## 2019-06-12 RX ADMIN — IOHEXOL 85 ML: 350 INJECTION, SOLUTION INTRAVENOUS at 07:41

## 2019-06-13 ENCOUNTER — TELEPHONE (OUTPATIENT)
Dept: HEMATOLOGY ONCOLOGY | Facility: CLINIC | Age: 69
End: 2019-06-13

## 2019-06-14 ENCOUNTER — HOSPITAL ENCOUNTER (OUTPATIENT)
Dept: INFUSION CENTER | Facility: CLINIC | Age: 69
Discharge: HOME | End: 2019-06-14
Payer: MEDICARE

## 2019-06-14 VITALS
HEIGHT: 64 IN | WEIGHT: 223.55 LBS | SYSTOLIC BLOOD PRESSURE: 140 MMHG | OXYGEN SATURATION: 99 % | TEMPERATURE: 98 F | RESPIRATION RATE: 18 BRPM | DIASTOLIC BLOOD PRESSURE: 78 MMHG | BODY MASS INDEX: 38.16 KG/M2 | HEART RATE: 67 BPM

## 2019-06-14 DIAGNOSIS — C34.92 ADENOCARCINOMA OF LEFT LUNG, STAGE 4 (HCC): Primary | ICD-10-CM

## 2019-06-14 PROCEDURE — 96409 CHEMO IV PUSH SNGL DRUG: CPT

## 2019-06-14 PROCEDURE — 96367 TX/PROPH/DG ADDL SEQ IV INF: CPT

## 2019-06-14 RX ORDER — SODIUM CHLORIDE 9 MG/ML
20 INJECTION, SOLUTION INTRAVENOUS ONCE
Status: COMPLETED | OUTPATIENT
Start: 2019-06-14 | End: 2019-06-14

## 2019-06-14 RX ADMIN — SODIUM CHLORIDE 1000 MG: 9 INJECTION, SOLUTION INTRAVENOUS at 12:43

## 2019-06-14 RX ADMIN — SODIUM CHLORIDE 20 ML/HR: 0.9 INJECTION, SOLUTION INTRAVENOUS at 11:58

## 2019-06-14 RX ADMIN — ONDANSETRON 16 MG: 2 INJECTION INTRAMUSCULAR; INTRAVENOUS at 12:13

## 2019-06-17 ENCOUNTER — OFFICE VISIT (OUTPATIENT)
Dept: HEMATOLOGY ONCOLOGY | Facility: CLINIC | Age: 69
End: 2019-06-17
Payer: MEDICARE

## 2019-06-17 VITALS
WEIGHT: 222 LBS | HEART RATE: 65 BPM | RESPIRATION RATE: 14 BRPM | BODY MASS INDEX: 37.9 KG/M2 | DIASTOLIC BLOOD PRESSURE: 70 MMHG | TEMPERATURE: 97.6 F | SYSTOLIC BLOOD PRESSURE: 140 MMHG | HEIGHT: 64 IN

## 2019-06-17 DIAGNOSIS — C34.92 ADENOCARCINOMA OF LEFT LUNG, STAGE 4 (HCC): Primary | ICD-10-CM

## 2019-06-17 PROCEDURE — 99214 OFFICE O/P EST MOD 30 MIN: CPT | Performed by: INTERNAL MEDICINE

## 2019-06-17 RX ORDER — SODIUM CHLORIDE 9 MG/ML
20 INJECTION, SOLUTION INTRAVENOUS ONCE
Status: CANCELLED | OUTPATIENT
Start: 2019-07-05

## 2019-06-17 RX ORDER — SODIUM CHLORIDE 9 MG/ML
20 INJECTION, SOLUTION INTRAVENOUS ONCE
Status: CANCELLED | OUTPATIENT
Start: 2019-08-03

## 2019-07-02 ENCOUNTER — APPOINTMENT (OUTPATIENT)
Dept: LAB | Facility: CLINIC | Age: 69
End: 2019-07-02
Payer: MEDICARE

## 2019-07-02 DIAGNOSIS — C34.92 ADENOCARCINOMA OF LEFT LUNG, STAGE 4 (HCC): ICD-10-CM

## 2019-07-02 LAB
ALBUMIN SERPL BCP-MCNC: 3.2 G/DL (ref 3.5–5)
ALP SERPL-CCNC: 75 U/L (ref 46–116)
ALT SERPL W P-5'-P-CCNC: 54 U/L (ref 12–78)
ANION GAP SERPL CALCULATED.3IONS-SCNC: 7 MMOL/L (ref 4–13)
AST SERPL W P-5'-P-CCNC: 41 U/L (ref 5–45)
BASOPHILS # BLD AUTO: 0.02 THOUSANDS/ΜL (ref 0–0.1)
BASOPHILS NFR BLD AUTO: 1 % (ref 0–1)
BILIRUB SERPL-MCNC: 0.4 MG/DL (ref 0.2–1)
BUN SERPL-MCNC: 19 MG/DL (ref 5–25)
CALCIUM SERPL-MCNC: 8.9 MG/DL (ref 8.3–10.1)
CHLORIDE SERPL-SCNC: 104 MMOL/L (ref 100–108)
CO2 SERPL-SCNC: 31 MMOL/L (ref 21–32)
CREAT SERPL-MCNC: 1.15 MG/DL (ref 0.6–1.3)
EOSINOPHIL # BLD AUTO: 0.06 THOUSAND/ΜL (ref 0–0.61)
EOSINOPHIL NFR BLD AUTO: 1 % (ref 0–6)
ERYTHROCYTE [DISTWIDTH] IN BLOOD BY AUTOMATED COUNT: 14.2 % (ref 11.6–15.1)
GFR SERPL CREATININE-BSD FRML MDRD: 48 ML/MIN/1.73SQ M
GLUCOSE P FAST SERPL-MCNC: 146 MG/DL (ref 65–99)
HCT VFR BLD AUTO: 35.2 % (ref 34.8–46.1)
HGB BLD-MCNC: 11.1 G/DL (ref 11.5–15.4)
IMM GRANULOCYTES # BLD AUTO: 0.02 THOUSAND/UL (ref 0–0.2)
IMM GRANULOCYTES NFR BLD AUTO: 1 % (ref 0–2)
LYMPHOCYTES # BLD AUTO: 0.81 THOUSANDS/ΜL (ref 0.6–4.47)
LYMPHOCYTES NFR BLD AUTO: 19 % (ref 14–44)
MCH RBC QN AUTO: 29.7 PG (ref 26.8–34.3)
MCHC RBC AUTO-ENTMCNC: 31.5 G/DL (ref 31.4–37.4)
MCV RBC AUTO: 94 FL (ref 82–98)
MONOCYTES # BLD AUTO: 0.56 THOUSAND/ΜL (ref 0.17–1.22)
MONOCYTES NFR BLD AUTO: 13 % (ref 4–12)
NEUTROPHILS # BLD AUTO: 2.79 THOUSANDS/ΜL (ref 1.85–7.62)
NEUTS SEG NFR BLD AUTO: 65 % (ref 43–75)
NRBC BLD AUTO-RTO: 0 /100 WBCS
PLATELET # BLD AUTO: 204 THOUSANDS/UL (ref 149–390)
PMV BLD AUTO: 10.9 FL (ref 8.9–12.7)
POTASSIUM SERPL-SCNC: 3.9 MMOL/L (ref 3.5–5.3)
PROT SERPL-MCNC: 6.4 G/DL (ref 6.4–8.2)
RBC # BLD AUTO: 3.74 MILLION/UL (ref 3.81–5.12)
SODIUM SERPL-SCNC: 142 MMOL/L (ref 136–145)
TSH SERPL DL<=0.05 MIU/L-ACNC: 2.26 UIU/ML (ref 0.36–3.74)
WBC # BLD AUTO: 4.26 THOUSAND/UL (ref 4.31–10.16)

## 2019-07-02 PROCEDURE — 80053 COMPREHEN METABOLIC PANEL: CPT

## 2019-07-02 PROCEDURE — 84443 ASSAY THYROID STIM HORMONE: CPT

## 2019-07-02 PROCEDURE — 36415 COLL VENOUS BLD VENIPUNCTURE: CPT

## 2019-07-02 PROCEDURE — 85025 COMPLETE CBC W/AUTO DIFF WBC: CPT

## 2019-07-03 ENCOUNTER — TREATMENT (OUTPATIENT)
Dept: HEMATOLOGY ONCOLOGY | Facility: CLINIC | Age: 69
End: 2019-07-03

## 2019-07-03 NOTE — PROGRESS NOTES
Pembrolizumab was dictated by mistake, patient to continue on Alimta 500 milligram/meter squared every 3 weeks no need for Pembrolizumab

## 2019-07-05 ENCOUNTER — HOSPITAL ENCOUNTER (OUTPATIENT)
Dept: INFUSION CENTER | Facility: CLINIC | Age: 69
Discharge: HOME | End: 2019-07-05
Payer: MEDICARE

## 2019-07-05 VITALS
HEIGHT: 64 IN | TEMPERATURE: 97.8 F | WEIGHT: 218.5 LBS | RESPIRATION RATE: 18 BRPM | HEART RATE: 73 BPM | BODY MASS INDEX: 37.3 KG/M2 | OXYGEN SATURATION: 95 % | DIASTOLIC BLOOD PRESSURE: 73 MMHG | SYSTOLIC BLOOD PRESSURE: 164 MMHG

## 2019-07-05 DIAGNOSIS — C41.9 MALIGNANT NEOPLASM OF BONE WITH METASTASES (HCC): ICD-10-CM

## 2019-07-05 DIAGNOSIS — C41.9 MALIGNANT NEOPLASM OF BONE WITH METASTASES (HCC): Primary | ICD-10-CM

## 2019-07-05 DIAGNOSIS — C34.92 ADENOCARCINOMA OF LEFT LUNG, STAGE 4 (HCC): Primary | ICD-10-CM

## 2019-07-05 PROCEDURE — 96409 CHEMO IV PUSH SNGL DRUG: CPT

## 2019-07-05 PROCEDURE — 96372 THER/PROPH/DIAG INJ SC/IM: CPT

## 2019-07-05 PROCEDURE — 96367 TX/PROPH/DG ADDL SEQ IV INF: CPT

## 2019-07-05 RX ORDER — CYANOCOBALAMIN 1000 UG/ML
1000 INJECTION INTRAMUSCULAR; SUBCUTANEOUS ONCE
Status: COMPLETED | OUTPATIENT
Start: 2019-07-05 | End: 2019-07-05

## 2019-07-05 RX ORDER — SODIUM CHLORIDE 9 MG/ML
20 INJECTION, SOLUTION INTRAVENOUS ONCE
Status: COMPLETED | OUTPATIENT
Start: 2019-07-05 | End: 2019-07-05

## 2019-07-05 RX ORDER — CYANOCOBALAMIN 1000 UG/ML
1000 INJECTION INTRAMUSCULAR; SUBCUTANEOUS ONCE
Status: CANCELLED | OUTPATIENT
Start: 2019-09-06

## 2019-07-05 RX ORDER — CYANOCOBALAMIN 1000 UG/ML
1000 INJECTION INTRAMUSCULAR; SUBCUTANEOUS ONCE
Status: CANCELLED | OUTPATIENT
Start: 2019-07-05

## 2019-07-05 RX ADMIN — ONDANSETRON 16 MG: 2 INJECTION INTRAMUSCULAR; INTRAVENOUS at 10:36

## 2019-07-05 RX ADMIN — SODIUM CHLORIDE 20 ML/HR: 0.9 INJECTION, SOLUTION INTRAVENOUS at 10:37

## 2019-07-05 RX ADMIN — CYANOCOBALAMIN 1000 MCG: 1000 INJECTION, SOLUTION INTRAMUSCULAR at 12:05

## 2019-07-05 RX ADMIN — SODIUM CHLORIDE 1000 MG: 9 INJECTION, SOLUTION INTRAVENOUS at 11:16

## 2019-07-30 ENCOUNTER — APPOINTMENT (OUTPATIENT)
Dept: LAB | Facility: CLINIC | Age: 69
End: 2019-07-30
Payer: MEDICARE

## 2019-07-30 DIAGNOSIS — C34.92 ADENOCARCINOMA OF LEFT LUNG, STAGE 4 (HCC): ICD-10-CM

## 2019-07-30 LAB
ALBUMIN SERPL BCP-MCNC: 3.1 G/DL (ref 3.5–5)
ALP SERPL-CCNC: 72 U/L (ref 46–116)
ALT SERPL W P-5'-P-CCNC: 44 U/L (ref 12–78)
ANION GAP SERPL CALCULATED.3IONS-SCNC: 8 MMOL/L (ref 4–13)
AST SERPL W P-5'-P-CCNC: 33 U/L (ref 5–45)
BASOPHILS # BLD AUTO: 0.05 THOUSANDS/ΜL (ref 0–0.1)
BASOPHILS NFR BLD AUTO: 1 % (ref 0–1)
BILIRUB SERPL-MCNC: 0.5 MG/DL (ref 0.2–1)
BUN SERPL-MCNC: 16 MG/DL (ref 5–25)
CALCIUM SERPL-MCNC: 8.8 MG/DL (ref 8.3–10.1)
CHLORIDE SERPL-SCNC: 104 MMOL/L (ref 100–108)
CO2 SERPL-SCNC: 28 MMOL/L (ref 21–32)
CREAT SERPL-MCNC: 1.18 MG/DL (ref 0.6–1.3)
EOSINOPHIL # BLD AUTO: 0.13 THOUSAND/ΜL (ref 0–0.61)
EOSINOPHIL NFR BLD AUTO: 3 % (ref 0–6)
ERYTHROCYTE [DISTWIDTH] IN BLOOD BY AUTOMATED COUNT: 14.4 % (ref 11.6–15.1)
GFR SERPL CREATININE-BSD FRML MDRD: 47 ML/MIN/1.73SQ M
GLUCOSE P FAST SERPL-MCNC: 134 MG/DL (ref 65–99)
HCT VFR BLD AUTO: 35.9 % (ref 34.8–46.1)
HGB BLD-MCNC: 11.4 G/DL (ref 11.5–15.4)
IMM GRANULOCYTES # BLD AUTO: 0.03 THOUSAND/UL (ref 0–0.2)
IMM GRANULOCYTES NFR BLD AUTO: 1 % (ref 0–2)
LYMPHOCYTES # BLD AUTO: 1.05 THOUSANDS/ΜL (ref 0.6–4.47)
LYMPHOCYTES NFR BLD AUTO: 21 % (ref 14–44)
MCH RBC QN AUTO: 29.6 PG (ref 26.8–34.3)
MCHC RBC AUTO-ENTMCNC: 31.8 G/DL (ref 31.4–37.4)
MCV RBC AUTO: 93 FL (ref 82–98)
MONOCYTES # BLD AUTO: 0.64 THOUSAND/ΜL (ref 0.17–1.22)
MONOCYTES NFR BLD AUTO: 13 % (ref 4–12)
NEUTROPHILS # BLD AUTO: 3.12 THOUSANDS/ΜL (ref 1.85–7.62)
NEUTS SEG NFR BLD AUTO: 61 % (ref 43–75)
NRBC BLD AUTO-RTO: 0 /100 WBCS
PLATELET # BLD AUTO: 234 THOUSANDS/UL (ref 149–390)
PMV BLD AUTO: 11 FL (ref 8.9–12.7)
POTASSIUM SERPL-SCNC: 3.5 MMOL/L (ref 3.5–5.3)
PROT SERPL-MCNC: 6.2 G/DL (ref 6.4–8.2)
RBC # BLD AUTO: 3.85 MILLION/UL (ref 3.81–5.12)
SODIUM SERPL-SCNC: 140 MMOL/L (ref 136–145)
WBC # BLD AUTO: 5.02 THOUSAND/UL (ref 4.31–10.16)

## 2019-07-30 PROCEDURE — 80053 COMPREHEN METABOLIC PANEL: CPT

## 2019-07-30 PROCEDURE — 85025 COMPLETE CBC W/AUTO DIFF WBC: CPT

## 2019-07-30 PROCEDURE — 36415 COLL VENOUS BLD VENIPUNCTURE: CPT

## 2019-08-01 ENCOUNTER — TELEPHONE (OUTPATIENT)
Dept: HEMATOLOGY ONCOLOGY | Facility: CLINIC | Age: 69
End: 2019-08-01

## 2019-08-01 NOTE — TELEPHONE ENCOUNTER
Called patient back and informed her that her labs look good and she is okay for treatment  Patient verbalized understanding

## 2019-08-01 NOTE — TELEPHONE ENCOUNTER
Patient called stating that she wanted to knoe if she is to have her chemo tomorrow based on her labs from 7/30/19  Please review labs with Dr Ariana Weaver and contact the patient with the information

## 2019-08-02 ENCOUNTER — HOSPITAL ENCOUNTER (OUTPATIENT)
Dept: INFUSION CENTER | Facility: CLINIC | Age: 69
Discharge: HOME | End: 2019-08-02
Payer: MEDICARE

## 2019-08-02 VITALS
SYSTOLIC BLOOD PRESSURE: 150 MMHG | DIASTOLIC BLOOD PRESSURE: 74 MMHG | HEIGHT: 64 IN | OXYGEN SATURATION: 97 % | RESPIRATION RATE: 14 BRPM | WEIGHT: 223 LBS | TEMPERATURE: 98 F | BODY MASS INDEX: 38.07 KG/M2 | HEART RATE: 75 BPM

## 2019-08-02 DIAGNOSIS — C34.92 ADENOCARCINOMA OF LEFT LUNG, STAGE 4 (HCC): Primary | ICD-10-CM

## 2019-08-02 PROCEDURE — 96409 CHEMO IV PUSH SNGL DRUG: CPT

## 2019-08-02 PROCEDURE — 96367 TX/PROPH/DG ADDL SEQ IV INF: CPT

## 2019-08-02 RX ORDER — SODIUM CHLORIDE 9 MG/ML
20 INJECTION, SOLUTION INTRAVENOUS ONCE
Status: COMPLETED | OUTPATIENT
Start: 2019-08-02 | End: 2019-08-02

## 2019-08-02 RX ADMIN — SODIUM CHLORIDE 20 ML/HR: 0.9 INJECTION, SOLUTION INTRAVENOUS at 10:47

## 2019-08-02 RX ADMIN — ONDANSETRON 16 MG: 2 INJECTION INTRAMUSCULAR; INTRAVENOUS at 10:47

## 2019-08-02 RX ADMIN — SODIUM CHLORIDE 1000 MG: 9 INJECTION, SOLUTION INTRAVENOUS at 11:17

## 2019-08-05 ENCOUNTER — OFFICE VISIT (OUTPATIENT)
Dept: HEMATOLOGY ONCOLOGY | Facility: CLINIC | Age: 69
End: 2019-08-05
Payer: MEDICARE

## 2019-08-05 VITALS
DIASTOLIC BLOOD PRESSURE: 70 MMHG | HEIGHT: 64 IN | HEART RATE: 75 BPM | RESPIRATION RATE: 16 BRPM | BODY MASS INDEX: 37.9 KG/M2 | OXYGEN SATURATION: 98 % | SYSTOLIC BLOOD PRESSURE: 150 MMHG | TEMPERATURE: 98.3 F | WEIGHT: 222 LBS

## 2019-08-05 DIAGNOSIS — C34.90 MALIGNANT NEOPLASM OF LUNG, UNSPECIFIED LATERALITY, UNSPECIFIED PART OF LUNG (HCC): Primary | ICD-10-CM

## 2019-08-05 DIAGNOSIS — C34.92 ADENOCARCINOMA OF LEFT LUNG, STAGE 4 (HCC): ICD-10-CM

## 2019-08-05 DIAGNOSIS — C34.91 MALIGNANT NEOPLASM OF UNSPECIFIED PART OF RIGHT BRONCHUS OR LUNG (HCC): ICD-10-CM

## 2019-08-05 PROCEDURE — 1124F ACP DISCUSS-NO DSCNMKR DOCD: CPT | Performed by: PHYSICIAN ASSISTANT

## 2019-08-05 PROCEDURE — 99213 OFFICE O/P EST LOW 20 MIN: CPT | Performed by: PHYSICIAN ASSISTANT

## 2019-08-05 RX ORDER — SODIUM CHLORIDE 9 MG/ML
20 INJECTION, SOLUTION INTRAVENOUS ONCE
Status: CANCELLED | OUTPATIENT
Start: 2019-08-23

## 2019-08-05 NOTE — PROGRESS NOTES
Hematology/Oncology Outpatient Follow- up Note  Javy Fan Hero 68 y o  female MRN: @ Encounter: [id number]        Date:  8/5/2019      Assessment / Plan:    1  Adenocarcinoma of the lung, primary in the left upper lobe of the lung with left scapula involved, by definition this is stage IV diagnosed initially in 08/2016, PDL expression more than 50%  Negative for ALK gene rearrangement, EGFR mutation Ros mutation     She was treated with Pembrolizumab complicated with pneumonitis and later on she continued on nivolumab with prednisone 10 mg p o  daily with excellent response until disease progression October 2018 of the left scapular area with pain, no new lesions on the PET scan  She received an additional course of radiation therapy of the left scapula and then treated with Alimta 500 milligram/meter squared, carboplatin AUC 5  After 3 cycles, CT scan in January 2019 showed stable disease, subsequent CT scans showed stable disease   She continues on prednisone 10 mg p o  Daily for radiation pneumonitis  We discussed trial of decreasing dose  CT chest 6/2019 identified findings of progressive radiation pneumonitis  She wishes to stay on current dose  Pembrolizumab was discontinued 7/3/2019  Alimta 500 mg per meter squared every 3 weeks continues      Follow-up in 6 weeks with CBC, CMP, TSH                     HPI:  Kristian Paul was admitted to the hospital with arrhythmia and was found to have right lower lobe infiltrate in August 2016   She wasreated with antibiotics however repeat chest x-ray showed persistent right lower lobe infiltrate  Subsequently the patient had a CT scan of the chest which showed a right perihilar mass, subcarinal lymphadenopathy, lytic lesion of the right costovertebral junction at T10 level   PET scan October 2016 showed a right perihilar mass measuring 3 5 cm with SUV of 8 9, subcarinal lymph nodes measuring 3 4 x 2 2 cm with SUV of 9 2   Nodule in the left upper lobe lung measured 2 x 1 1 cm   There was a lytic lesion involving the right 10th costovertebral junction with SUV of 14  4      Biopsy showed non-small cell carcinoma with features suggesting of adenocarcinoma positive for CK 7, CK 19, CA-19-9, ANEUDY-3, partially positive for P40, p63, negative for TTF-1   She had a history of uterine cancer in 2000 status post hysterectomy and did not require radiation or chemotherapy   She is status post bilateral oophorectomy, right knee replacement,   tonsillectomy   She used to smoke for 35 years 1 pack per day however quit smoking 21 years ago   She used hormonal replacement therapy for 3 years  Harman Bain has a family history significant for skin cancer in her father and coronary artery disease in mother  Harman Bain has 2 healthy children      She developed pneumonitis with Pembrolizumab requiring discontinuation of therapy for 6 months    She had progression of disease in the left upper lobe as well as the scapula even though she received radiation therapy to the scapula area  Nivolumab 240 mg flat dose every 2 weeks along with prednisone 10 mg p o  Daily initiated April 2018- October 2018       Liquid biopsy showed K-MADHURI mutation G12C, no evidence of MSI high     Current Therapy:  11/6/2018: Alimta 500 milligram/meter squared, carboplatin AUC 5 every 3 weeks with Vitamin B12 1000 mcg IM shot every 3 weeks, multivitamin 1 tab p o  Daily   Carboplatin d/c'd after 6 cycles      Previous Therapy:    1   Pembrolizumab 200 mg IV flat dose every 3 weeks initiated in December 2016, Finished in May 2017 secondary to grade 3 pneumonitis   2   radiation therapy to the left scapula in October 2017  3   Nivolumab 240 mg flat dose every 2 weeks initiated in April 2018 secondary to progression of the disease in the lung and the left scapula, prednisone 10 mg p o  daily to prevent pneumonitis  Nivolumab was continued every 2 weeks because of previous history of pneumonitis on Pembrolizumab    4   Additional 10 radiation tx to left scapula finished in November 2018          HPI:        Interval History:            Test Results:        Labs:   Lab Results   Component Value Date    HGB 11 4 (L) 07/30/2019    HCT 35 9 07/30/2019    MCV 93 07/30/2019     07/30/2019    WBC 5 02 07/30/2019    NRBC 0 07/30/2019     Lab Results   Component Value Date     11/23/2015    K 3 5 07/30/2019     07/30/2019    CO2 28 07/30/2019    ANIONGAP 9 11/23/2015    BUN 16 07/30/2019    CREATININE 1 18 07/30/2019    GLUCOSE 149 (H) 11/23/2015    GLUF 134 (H) 07/30/2019    CALCIUM 8 8 07/30/2019    AST 33 07/30/2019    ALT 44 07/30/2019    ALKPHOS 72 07/30/2019    PROT 6 8 11/23/2015    BILITOT 0 65 11/23/2015    EGFR 47 07/30/2019       Imaging: No results found  ROS:  As mentioned in HPI & Interval History otherwise 14 point ROS negative  Allergies: Allergies   Allergen Reactions    Amoxicillin Rash and Hives    Cardizem [Diltiazem] Rash     Rash      Statins Myalgia     Severe muscle aching  Terrible pains     Current Medications: Reviewed  PMH/FH/SH:  Reviewed      Physical Exam:    There is no height or weight on file to calculate BSA  Ht Readings from Last 3 Encounters:   08/02/19 5' 4 02" (1 626 m)   07/05/19 5' 4 02" (1 626 m)   06/17/19 5' 4" (1 626 m)        Wt Readings from Last 3 Encounters:   08/02/19 101 kg (223 lb)   07/05/19 99 1 kg (218 lb 8 oz)   06/17/19 101 kg (222 lb)        Temp Readings from Last 3 Encounters:   08/02/19 98 °F (36 7 °C) (Oral)   07/05/19 97 8 °F (36 6 °C) (Oral)   06/17/19 97 6 °F (36 4 °C)        BP Readings from Last 3 Encounters:   08/02/19 150/74   07/05/19 164/73   06/17/19 140/70           Physical Exam   Constitutional: She is oriented to person, place, and time  She appears well-developed and well-nourished  No distress  HENT:   Head: Normocephalic and atraumatic  Eyes: Conjunctivae are normal    Neck: Normal range of motion  Neck supple   No tracheal deviation present  Cardiovascular: Normal rate and regular rhythm  Exam reveals no gallop and no friction rub  No murmur heard  Pulmonary/Chest: Effort normal and breath sounds normal  No respiratory distress  She has no wheezes  She has no rales  She exhibits no tenderness  Abdominal: Soft  She exhibits no distension  There is no tenderness  Lymphadenopathy:     She has no cervical adenopathy  Neurological: She is alert and oriented to person, place, and time  Skin: Skin is warm and dry  She is not diaphoretic  No erythema  No pallor  Psychiatric: She has a normal mood and affect  Her behavior is normal  Judgment and thought content normal    Vitals reviewed        ECO      Emergency Contacts:    Marni Lesch, ,

## 2019-08-20 ENCOUNTER — APPOINTMENT (OUTPATIENT)
Dept: LAB | Facility: CLINIC | Age: 69
End: 2019-08-20
Payer: MEDICARE

## 2019-08-20 DIAGNOSIS — C34.92 ADENOCARCINOMA OF LEFT LUNG, STAGE 4 (HCC): ICD-10-CM

## 2019-08-20 LAB
ALBUMIN SERPL BCP-MCNC: 3.2 G/DL (ref 3.5–5)
ALP SERPL-CCNC: 69 U/L (ref 46–116)
ALT SERPL W P-5'-P-CCNC: 43 U/L (ref 12–78)
ANION GAP SERPL CALCULATED.3IONS-SCNC: 9 MMOL/L (ref 4–13)
AST SERPL W P-5'-P-CCNC: 33 U/L (ref 5–45)
BASOPHILS # BLD AUTO: 0.01 THOUSANDS/ΜL (ref 0–0.1)
BASOPHILS NFR BLD AUTO: 0 % (ref 0–1)
BILIRUB SERPL-MCNC: 0.5 MG/DL (ref 0.2–1)
BUN SERPL-MCNC: 20 MG/DL (ref 5–25)
CALCIUM SERPL-MCNC: 8.8 MG/DL (ref 8.3–10.1)
CHLORIDE SERPL-SCNC: 106 MMOL/L (ref 100–108)
CO2 SERPL-SCNC: 28 MMOL/L (ref 21–32)
CREAT SERPL-MCNC: 1.12 MG/DL (ref 0.6–1.3)
EOSINOPHIL # BLD AUTO: 0.07 THOUSAND/ΜL (ref 0–0.61)
EOSINOPHIL NFR BLD AUTO: 2 % (ref 0–6)
ERYTHROCYTE [DISTWIDTH] IN BLOOD BY AUTOMATED COUNT: 14.3 % (ref 11.6–15.1)
GFR SERPL CREATININE-BSD FRML MDRD: 50 ML/MIN/1.73SQ M
GLUCOSE P FAST SERPL-MCNC: 166 MG/DL (ref 65–99)
HCT VFR BLD AUTO: 36.5 % (ref 34.8–46.1)
HGB BLD-MCNC: 11.8 G/DL (ref 11.5–15.4)
IMM GRANULOCYTES # BLD AUTO: 0.01 THOUSAND/UL (ref 0–0.2)
IMM GRANULOCYTES NFR BLD AUTO: 0 % (ref 0–2)
LYMPHOCYTES # BLD AUTO: 0.83 THOUSANDS/ΜL (ref 0.6–4.47)
LYMPHOCYTES NFR BLD AUTO: 19 % (ref 14–44)
MCH RBC QN AUTO: 30.1 PG (ref 26.8–34.3)
MCHC RBC AUTO-ENTMCNC: 32.3 G/DL (ref 31.4–37.4)
MCV RBC AUTO: 93 FL (ref 82–98)
MONOCYTES # BLD AUTO: 0.52 THOUSAND/ΜL (ref 0.17–1.22)
MONOCYTES NFR BLD AUTO: 12 % (ref 4–12)
NEUTROPHILS # BLD AUTO: 2.93 THOUSANDS/ΜL (ref 1.85–7.62)
NEUTS SEG NFR BLD AUTO: 67 % (ref 43–75)
NRBC BLD AUTO-RTO: 0 /100 WBCS
PLATELET # BLD AUTO: 233 THOUSANDS/UL (ref 149–390)
PMV BLD AUTO: 10.7 FL (ref 8.9–12.7)
POTASSIUM SERPL-SCNC: 4 MMOL/L (ref 3.5–5.3)
PROT SERPL-MCNC: 6.5 G/DL (ref 6.4–8.2)
RBC # BLD AUTO: 3.92 MILLION/UL (ref 3.81–5.12)
SODIUM SERPL-SCNC: 143 MMOL/L (ref 136–145)
WBC # BLD AUTO: 4.37 THOUSAND/UL (ref 4.31–10.16)

## 2019-08-20 PROCEDURE — 36415 COLL VENOUS BLD VENIPUNCTURE: CPT

## 2019-08-20 PROCEDURE — 85025 COMPLETE CBC W/AUTO DIFF WBC: CPT

## 2019-08-20 PROCEDURE — 80053 COMPREHEN METABOLIC PANEL: CPT

## 2019-08-21 ENCOUNTER — TELEPHONE (OUTPATIENT)
Dept: HEMATOLOGY ONCOLOGY | Facility: CLINIC | Age: 69
End: 2019-08-21

## 2019-08-21 NOTE — TELEPHONE ENCOUNTER
Got bw done on 8 20 19    Wanted to make sure that levels were good in order to get chemo on 8 23 19  Req that we call her back

## 2019-08-23 ENCOUNTER — HOSPITAL ENCOUNTER (OUTPATIENT)
Dept: INFUSION CENTER | Facility: CLINIC | Age: 69
Discharge: HOME | End: 2019-08-23
Payer: MEDICARE

## 2019-08-23 ENCOUNTER — TELEPHONE (OUTPATIENT)
Dept: HEMATOLOGY ONCOLOGY | Facility: CLINIC | Age: 69
End: 2019-08-23

## 2019-08-23 VITALS
TEMPERATURE: 97.6 F | RESPIRATION RATE: 16 BRPM | HEART RATE: 65 BPM | SYSTOLIC BLOOD PRESSURE: 142 MMHG | BODY MASS INDEX: 37.81 KG/M2 | DIASTOLIC BLOOD PRESSURE: 68 MMHG | OXYGEN SATURATION: 97 % | WEIGHT: 221.5 LBS | HEIGHT: 64 IN

## 2019-08-23 DIAGNOSIS — C34.92 ADENOCARCINOMA OF LEFT LUNG, STAGE 4 (HCC): Primary | ICD-10-CM

## 2019-08-23 PROCEDURE — 96409 CHEMO IV PUSH SNGL DRUG: CPT

## 2019-08-23 PROCEDURE — 96367 TX/PROPH/DG ADDL SEQ IV INF: CPT

## 2019-08-23 RX ORDER — SODIUM CHLORIDE 9 MG/ML
20 INJECTION, SOLUTION INTRAVENOUS ONCE
Status: COMPLETED | OUTPATIENT
Start: 2019-08-23 | End: 2019-08-23

## 2019-08-23 RX ADMIN — ONDANSETRON 16 MG: 2 INJECTION INTRAMUSCULAR; INTRAVENOUS at 10:54

## 2019-08-23 RX ADMIN — SODIUM CHLORIDE 20 ML/HR: 0.9 INJECTION, SOLUTION INTRAVENOUS at 10:40

## 2019-08-23 RX ADMIN — SODIUM CHLORIDE 1000 MG: 9 INJECTION, SOLUTION INTRAVENOUS at 11:23

## 2019-08-23 NOTE — TELEPHONE ENCOUNTER
Belkis Rojo called to see if she can get her flu shot next week  Transferred the call to oz and Oz spoke to her

## 2019-09-09 RX ORDER — SODIUM CHLORIDE 9 MG/ML
20 INJECTION, SOLUTION INTRAVENOUS ONCE
Status: CANCELLED | OUTPATIENT
Start: 2019-09-13

## 2019-09-11 ENCOUNTER — TELEPHONE (OUTPATIENT)
Dept: HEMATOLOGY ONCOLOGY | Facility: CLINIC | Age: 69
End: 2019-09-11

## 2019-09-11 ENCOUNTER — TRANSCRIBE ORDERS (OUTPATIENT)
Dept: LAB | Facility: CLINIC | Age: 69
End: 2019-09-11

## 2019-09-11 ENCOUNTER — APPOINTMENT (OUTPATIENT)
Dept: LAB | Facility: CLINIC | Age: 69
End: 2019-09-11
Payer: MEDICARE

## 2019-09-11 DIAGNOSIS — C34.92 ADENOCARCINOMA OF LEFT LUNG, STAGE 4 (HCC): ICD-10-CM

## 2019-09-11 LAB
ALBUMIN SERPL BCP-MCNC: 3.1 G/DL (ref 3.5–5)
ALP SERPL-CCNC: 67 U/L (ref 46–116)
ALT SERPL W P-5'-P-CCNC: 43 U/L (ref 12–78)
ANION GAP SERPL CALCULATED.3IONS-SCNC: 8 MMOL/L (ref 4–13)
AST SERPL W P-5'-P-CCNC: 27 U/L (ref 5–45)
BASOPHILS # BLD AUTO: 0.03 THOUSANDS/ΜL (ref 0–0.1)
BASOPHILS NFR BLD AUTO: 1 % (ref 0–1)
BILIRUB SERPL-MCNC: 0.6 MG/DL (ref 0.2–1)
BUN SERPL-MCNC: 21 MG/DL (ref 5–25)
CALCIUM SERPL-MCNC: 9 MG/DL (ref 8.3–10.1)
CHLORIDE SERPL-SCNC: 106 MMOL/L (ref 100–108)
CO2 SERPL-SCNC: 29 MMOL/L (ref 21–32)
CREAT SERPL-MCNC: 1.06 MG/DL (ref 0.6–1.3)
EOSINOPHIL # BLD AUTO: 0.06 THOUSAND/ΜL (ref 0–0.61)
EOSINOPHIL NFR BLD AUTO: 1 % (ref 0–6)
ERYTHROCYTE [DISTWIDTH] IN BLOOD BY AUTOMATED COUNT: 14.6 % (ref 11.6–15.1)
GFR SERPL CREATININE-BSD FRML MDRD: 53 ML/MIN/1.73SQ M
GLUCOSE P FAST SERPL-MCNC: 155 MG/DL (ref 65–99)
HCT VFR BLD AUTO: 34.5 % (ref 34.8–46.1)
HGB BLD-MCNC: 11 G/DL (ref 11.5–15.4)
IMM GRANULOCYTES # BLD AUTO: 0.02 THOUSAND/UL (ref 0–0.2)
IMM GRANULOCYTES NFR BLD AUTO: 1 % (ref 0–2)
LYMPHOCYTES # BLD AUTO: 0.86 THOUSANDS/ΜL (ref 0.6–4.47)
LYMPHOCYTES NFR BLD AUTO: 20 % (ref 14–44)
MCH RBC QN AUTO: 29.9 PG (ref 26.8–34.3)
MCHC RBC AUTO-ENTMCNC: 31.9 G/DL (ref 31.4–37.4)
MCV RBC AUTO: 94 FL (ref 82–98)
MONOCYTES # BLD AUTO: 0.47 THOUSAND/ΜL (ref 0.17–1.22)
MONOCYTES NFR BLD AUTO: 11 % (ref 4–12)
NEUTROPHILS # BLD AUTO: 2.85 THOUSANDS/ΜL (ref 1.85–7.62)
NEUTS SEG NFR BLD AUTO: 66 % (ref 43–75)
NRBC BLD AUTO-RTO: 0 /100 WBCS
PLATELET # BLD AUTO: 234 THOUSANDS/UL (ref 149–390)
PMV BLD AUTO: 11 FL (ref 8.9–12.7)
POTASSIUM SERPL-SCNC: 4 MMOL/L (ref 3.5–5.3)
PROT SERPL-MCNC: 6.1 G/DL (ref 6.4–8.2)
RBC # BLD AUTO: 3.68 MILLION/UL (ref 3.81–5.12)
SODIUM SERPL-SCNC: 143 MMOL/L (ref 136–145)
WBC # BLD AUTO: 4.29 THOUSAND/UL (ref 4.31–10.16)

## 2019-09-11 PROCEDURE — 85025 COMPLETE CBC W/AUTO DIFF WBC: CPT

## 2019-09-11 PROCEDURE — 36415 COLL VENOUS BLD VENIPUNCTURE: CPT

## 2019-09-11 PROCEDURE — 80053 COMPREHEN METABOLIC PANEL: CPT

## 2019-09-11 NOTE — TELEPHONE ENCOUNTER
Ruston calling stating she got her blood work done today 9/11/19 and wanted to know if her numbers are good going into her chemo treatment that is scheduled for Friday 9/13/19

## 2019-09-13 ENCOUNTER — HOSPITAL ENCOUNTER (OUTPATIENT)
Dept: INFUSION CENTER | Facility: CLINIC | Age: 69
Discharge: HOME | End: 2019-09-13
Payer: MEDICARE

## 2019-09-13 VITALS
TEMPERATURE: 97.5 F | DIASTOLIC BLOOD PRESSURE: 70 MMHG | HEART RATE: 65 BPM | OXYGEN SATURATION: 99 % | SYSTOLIC BLOOD PRESSURE: 132 MMHG | HEIGHT: 64 IN | RESPIRATION RATE: 18 BRPM | BODY MASS INDEX: 38.33 KG/M2 | WEIGHT: 224.5 LBS

## 2019-09-13 DIAGNOSIS — C41.9 MALIGNANT NEOPLASM OF BONE WITH METASTASES (HCC): Primary | ICD-10-CM

## 2019-09-13 DIAGNOSIS — C34.92 ADENOCARCINOMA OF LEFT LUNG, STAGE 4 (HCC): ICD-10-CM

## 2019-09-13 PROCEDURE — 96372 THER/PROPH/DIAG INJ SC/IM: CPT

## 2019-09-13 PROCEDURE — 96409 CHEMO IV PUSH SNGL DRUG: CPT

## 2019-09-13 PROCEDURE — 96367 TX/PROPH/DG ADDL SEQ IV INF: CPT

## 2019-09-13 RX ORDER — SODIUM CHLORIDE 9 MG/ML
20 INJECTION, SOLUTION INTRAVENOUS ONCE
Status: COMPLETED | OUTPATIENT
Start: 2019-09-13 | End: 2019-09-13

## 2019-09-13 RX ORDER — CYANOCOBALAMIN 1000 UG/ML
1000 INJECTION INTRAMUSCULAR; SUBCUTANEOUS ONCE
Status: COMPLETED | OUTPATIENT
Start: 2019-09-13 | End: 2019-09-13

## 2019-09-13 RX ORDER — CYANOCOBALAMIN 1000 UG/ML
1000 INJECTION INTRAMUSCULAR; SUBCUTANEOUS ONCE
Status: CANCELLED | OUTPATIENT
Start: 2019-11-15

## 2019-09-13 RX ADMIN — SODIUM CHLORIDE 20 ML/HR: 0.9 INJECTION, SOLUTION INTRAVENOUS at 10:41

## 2019-09-13 RX ADMIN — SODIUM CHLORIDE 1000 MG: 9 INJECTION, SOLUTION INTRAVENOUS at 11:26

## 2019-09-13 RX ADMIN — CYANOCOBALAMIN 1000 MCG: 1000 INJECTION, SOLUTION INTRAMUSCULAR; SUBCUTANEOUS at 11:39

## 2019-09-13 RX ADMIN — ONDANSETRON 16 MG: 2 INJECTION INTRAMUSCULAR; INTRAVENOUS at 10:48

## 2019-09-13 NOTE — PROGRESS NOTES
Pt tolerated alimta infusion well without any adverse reactions  Pt declined avs  appt verified  b12 injection given in right arm

## 2019-09-30 DIAGNOSIS — I10 BENIGN ESSENTIAL HYPERTENSION: ICD-10-CM

## 2019-09-30 DIAGNOSIS — Z11.59 ENCOUNTER FOR HEPATITIS C SCREENING TEST FOR LOW RISK PATIENT: ICD-10-CM

## 2019-09-30 DIAGNOSIS — E11.9 TYPE 2 DIABETES MELLITUS WITHOUT COMPLICATION, WITHOUT LONG-TERM CURRENT USE OF INSULIN (HCC): Primary | ICD-10-CM

## 2019-09-30 DIAGNOSIS — E78.2 MIXED HYPERLIPIDEMIA: ICD-10-CM

## 2019-09-30 PROBLEM — I11.0 HYPERTENSIVE HEART DISEASE WITH CONGESTIVE HEART FAILURE (HCC): Status: ACTIVE | Noted: 2019-09-30

## 2019-10-01 ENCOUNTER — APPOINTMENT (OUTPATIENT)
Dept: LAB | Facility: CLINIC | Age: 69
End: 2019-10-01
Payer: MEDICARE

## 2019-10-01 ENCOUNTER — HOSPITAL ENCOUNTER (OUTPATIENT)
Dept: CT IMAGING | Facility: HOSPITAL | Age: 69
Discharge: HOME | End: 2019-10-01
Payer: MEDICARE

## 2019-10-01 DIAGNOSIS — E78.2 MIXED HYPERLIPIDEMIA: ICD-10-CM

## 2019-10-01 DIAGNOSIS — E11.9 TYPE 2 DIABETES MELLITUS WITHOUT COMPLICATION, WITHOUT LONG-TERM CURRENT USE OF INSULIN (HCC): ICD-10-CM

## 2019-10-01 DIAGNOSIS — C34.92 ADENOCARCINOMA OF LEFT LUNG, STAGE 4 (HCC): ICD-10-CM

## 2019-10-01 DIAGNOSIS — C34.90 MALIGNANT NEOPLASM OF LUNG, UNSPECIFIED LATERALITY, UNSPECIFIED PART OF LUNG (HCC): ICD-10-CM

## 2019-10-01 DIAGNOSIS — Z11.59 ENCOUNTER FOR HEPATITIS C SCREENING TEST FOR LOW RISK PATIENT: ICD-10-CM

## 2019-10-01 DIAGNOSIS — C34.92 ADENOCARCINOMA OF LEFT LUNG, STAGE 4 (HCC): Primary | ICD-10-CM

## 2019-10-01 DIAGNOSIS — I10 BENIGN ESSENTIAL HYPERTENSION: ICD-10-CM

## 2019-10-01 LAB
ALBUMIN SERPL BCP-MCNC: 3.1 G/DL (ref 3.5–5)
ALP SERPL-CCNC: 68 U/L (ref 46–116)
ALT SERPL W P-5'-P-CCNC: 45 U/L (ref 12–78)
ANION GAP SERPL CALCULATED.3IONS-SCNC: 9 MMOL/L (ref 4–13)
AST SERPL W P-5'-P-CCNC: 34 U/L (ref 5–45)
BASOPHILS # BLD AUTO: 0.01 THOUSANDS/ΜL (ref 0–0.1)
BASOPHILS NFR BLD AUTO: 0 % (ref 0–1)
BILIRUB SERPL-MCNC: 0.6 MG/DL (ref 0.2–1)
BUN SERPL-MCNC: 26 MG/DL (ref 5–25)
CALCIUM SERPL-MCNC: 8.8 MG/DL (ref 8.3–10.1)
CHLORIDE SERPL-SCNC: 103 MMOL/L (ref 100–108)
CHOLEST SERPL-MCNC: 219 MG/DL (ref 50–200)
CO2 SERPL-SCNC: 28 MMOL/L (ref 21–32)
CREAT SERPL-MCNC: 1.14 MG/DL (ref 0.6–1.3)
EOSINOPHIL # BLD AUTO: 0.05 THOUSAND/ΜL (ref 0–0.61)
EOSINOPHIL NFR BLD AUTO: 1 % (ref 0–6)
ERYTHROCYTE [DISTWIDTH] IN BLOOD BY AUTOMATED COUNT: 14.5 % (ref 11.6–15.1)
EST. AVERAGE GLUCOSE BLD GHB EST-MCNC: 186 MG/DL
GFR SERPL CREATININE-BSD FRML MDRD: 48 ML/MIN/1.73SQ M
GLUCOSE P FAST SERPL-MCNC: 172 MG/DL (ref 65–99)
HBA1C MFR BLD: 8.1 % (ref 4.2–6.3)
HCT VFR BLD AUTO: 35.4 % (ref 34.8–46.1)
HCV AB SER QL: NORMAL
HDLC SERPL-MCNC: 44 MG/DL (ref 40–60)
HGB BLD-MCNC: 11.3 G/DL (ref 11.5–15.4)
IMM GRANULOCYTES # BLD AUTO: 0.02 THOUSAND/UL (ref 0–0.2)
IMM GRANULOCYTES NFR BLD AUTO: 0 % (ref 0–2)
LDLC SERPL CALC-MCNC: 150 MG/DL (ref 0–100)
LYMPHOCYTES # BLD AUTO: 0.79 THOUSANDS/ΜL (ref 0.6–4.47)
LYMPHOCYTES NFR BLD AUTO: 16 % (ref 14–44)
MCH RBC QN AUTO: 29.7 PG (ref 26.8–34.3)
MCHC RBC AUTO-ENTMCNC: 31.9 G/DL (ref 31.4–37.4)
MCV RBC AUTO: 93 FL (ref 82–98)
MONOCYTES # BLD AUTO: 0.61 THOUSAND/ΜL (ref 0.17–1.22)
MONOCYTES NFR BLD AUTO: 12 % (ref 4–12)
NEUTROPHILS # BLD AUTO: 3.42 THOUSANDS/ΜL (ref 1.85–7.62)
NEUTS SEG NFR BLD AUTO: 71 % (ref 43–75)
NRBC BLD AUTO-RTO: 0 /100 WBCS
PLATELET # BLD AUTO: 227 THOUSANDS/UL (ref 149–390)
PMV BLD AUTO: 10.6 FL (ref 8.9–12.7)
POTASSIUM SERPL-SCNC: 3.7 MMOL/L (ref 3.5–5.3)
PROT SERPL-MCNC: 6.3 G/DL (ref 6.4–8.2)
RBC # BLD AUTO: 3.81 MILLION/UL (ref 3.81–5.12)
SODIUM SERPL-SCNC: 140 MMOL/L (ref 136–145)
TRIGL SERPL-MCNC: 126 MG/DL
TSH SERPL DL<=0.05 MIU/L-ACNC: 1.98 UIU/ML (ref 0.36–3.74)
WBC # BLD AUTO: 4.9 THOUSAND/UL (ref 4.31–10.16)

## 2019-10-01 PROCEDURE — 85025 COMPLETE CBC W/AUTO DIFF WBC: CPT

## 2019-10-01 PROCEDURE — 84443 ASSAY THYROID STIM HORMONE: CPT

## 2019-10-01 PROCEDURE — 83036 HEMOGLOBIN GLYCOSYLATED A1C: CPT

## 2019-10-01 PROCEDURE — 71260 CT THORAX DX C+: CPT

## 2019-10-01 PROCEDURE — 36415 COLL VENOUS BLD VENIPUNCTURE: CPT

## 2019-10-01 PROCEDURE — 80061 LIPID PANEL: CPT

## 2019-10-01 PROCEDURE — 80053 COMPREHEN METABOLIC PANEL: CPT

## 2019-10-01 PROCEDURE — 86803 HEPATITIS C AB TEST: CPT

## 2019-10-01 RX ORDER — SODIUM CHLORIDE 9 MG/ML
20 INJECTION, SOLUTION INTRAVENOUS ONCE
Status: CANCELLED | OUTPATIENT
Start: 2019-10-25

## 2019-10-01 RX ORDER — SODIUM CHLORIDE 9 MG/ML
20 INJECTION, SOLUTION INTRAVENOUS ONCE
Status: CANCELLED | OUTPATIENT
Start: 2019-10-04

## 2019-10-01 RX ADMIN — IOHEXOL 85 ML: 350 INJECTION, SOLUTION INTRAVENOUS at 08:03

## 2019-10-02 ENCOUNTER — APPOINTMENT (OUTPATIENT)
Dept: LAB | Facility: CLINIC | Age: 69
End: 2019-10-02
Payer: MEDICARE

## 2019-10-02 ENCOUNTER — OFFICE VISIT (OUTPATIENT)
Dept: FAMILY MEDICINE CLINIC | Facility: CLINIC | Age: 69
End: 2019-10-02
Payer: MEDICARE

## 2019-10-02 ENCOUNTER — TELEPHONE (OUTPATIENT)
Dept: HEMATOLOGY ONCOLOGY | Facility: CLINIC | Age: 69
End: 2019-10-02

## 2019-10-02 VITALS
SYSTOLIC BLOOD PRESSURE: 120 MMHG | WEIGHT: 221.4 LBS | RESPIRATION RATE: 16 BRPM | DIASTOLIC BLOOD PRESSURE: 74 MMHG | BODY MASS INDEX: 37.8 KG/M2 | TEMPERATURE: 97.7 F | OXYGEN SATURATION: 98 % | HEIGHT: 64 IN | HEART RATE: 69 BPM

## 2019-10-02 DIAGNOSIS — C79.51 SECONDARY MALIGNANT NEOPLASM OF BONE AND BONE MARROW (HCC): ICD-10-CM

## 2019-10-02 DIAGNOSIS — C41.9 MALIGNANT NEOPLASM OF BONE WITH METASTASES (HCC): ICD-10-CM

## 2019-10-02 DIAGNOSIS — I10 BENIGN ESSENTIAL HYPERTENSION: ICD-10-CM

## 2019-10-02 DIAGNOSIS — F32.5 MAJOR DEPRESSIVE DISORDER WITH SINGLE EPISODE, IN FULL REMISSION (HCC): ICD-10-CM

## 2019-10-02 DIAGNOSIS — E11.9 TYPE 2 DIABETES MELLITUS WITHOUT COMPLICATION, WITHOUT LONG-TERM CURRENT USE OF INSULIN (HCC): Primary | ICD-10-CM

## 2019-10-02 DIAGNOSIS — I48.91 ATRIAL FIBRILLATION, UNSPECIFIED TYPE (HCC): ICD-10-CM

## 2019-10-02 DIAGNOSIS — Z12.31 ENCOUNTER FOR SCREENING MAMMOGRAM FOR BREAST CANCER: ICD-10-CM

## 2019-10-02 DIAGNOSIS — C79.52 SECONDARY MALIGNANT NEOPLASM OF BONE AND BONE MARROW (HCC): ICD-10-CM

## 2019-10-02 DIAGNOSIS — Z00.00 MEDICARE ANNUAL WELLNESS VISIT, SUBSEQUENT: ICD-10-CM

## 2019-10-02 DIAGNOSIS — I50.32 CHRONIC DIASTOLIC HEART FAILURE (HCC): ICD-10-CM

## 2019-10-02 LAB
CREAT UR-MCNC: 131 MG/DL
MICROALBUMIN UR-MCNC: 16.5 MG/L (ref 0–20)
MICROALBUMIN/CREAT 24H UR: 13 MG/G CREATININE (ref 0–30)

## 2019-10-02 PROCEDURE — 99214 OFFICE O/P EST MOD 30 MIN: CPT | Performed by: FAMILY MEDICINE

## 2019-10-02 PROCEDURE — G0439 PPPS, SUBSEQ VISIT: HCPCS | Performed by: FAMILY MEDICINE

## 2019-10-02 PROCEDURE — 82570 ASSAY OF URINE CREATININE: CPT

## 2019-10-02 PROCEDURE — 82043 UR ALBUMIN QUANTITATIVE: CPT

## 2019-10-02 NOTE — PROGRESS NOTES
Assessment and Plan:     Problem List Items Addressed This Visit        Other    Medicare annual wellness visit, subsequent     Mammogram ordered however pt gets Pet and CT's         Encounter for screening mammogram for breast cancer    Relevant Orders    Mammo screening bilateral w cad        BMI Counseling: Body mass index is 37 8 kg/m²  The BMI is above normal  Nutrition recommendations include decreasing portion sizes  Exercise recommendations include vigorous physical activity 75 minutes/week  No pharmacotherapy was ordered  Preventive health issues were discussed with patient, and age appropriate screening tests were ordered as noted in patient's After Visit Summary  Personalized health advice and appropriate referrals for health education or preventive services given if needed, as noted in patient's After Visit Summary       History of Present Illness:     Patient presents for Medicare Annual Wellness visit    Patient Care Team:  Leilani Qiu DO as PCP - MD Fabrizio Fleming MD Karsten Burgess, MD Kemp Patch, MD Amelia Mention, MD Deloris Kubas, DO Kimberly A Sula Bad, MD  [facility],      Problem List:     Patient Active Problem List   Diagnosis    Paroxysmal atrial fibrillation ([facility] )    Class 2 severe obesity due to excess calories with serious comorbidity and body mass index (BMI) of 38 0 to 38 9 in adult [facility])    Facial rash    [facility], [facility] 4 [facility]    Hyponatremia    Chronic diastolic heart failure ([facility] )    Cancer related pain    Abnormal chest x-ray    Atrial fibrillation ([facility] )    Benign essential hypertension    Diabetes mellitus type 2, uncomplicated ([facility] )    Malignant neoplasm of bone with metastases ([facility] )    Acid reflux    Hyperlipidemia    Insomnia    Major depressive disorder with single episode, in full remission ([facility] )    Vitamin D deficiency    Adhesive capsulitis of shoulder    Osteoarthritis of knee    Anxiety    Lung nodule, multiple    Medicare annual wellness visit, subsequent    Hypertensive heart disease with congestive heart failure ([place] [address] )    Encounter for screening mammogram for breast cancer      Past Medical and Surgical History:     Past Medical History:   Diagnosis Date    Arthritis     Atrial fibrillation ([place] [address] )     Diabetes mellitus ([place] [address] )     Diabetes mellitus type 2, uncomplicated ([place] [address] )     Last assessed: 8/17/17    Essential hypertension     Frozen shoulder     L shoulder    GERD (gastroesophageal reflux disease)     Hx of cancer of uterus     Last assessed: 8/21/15    Hyperlipidemia     Lung mass     diagnosed 9/2016    Malignant neoplasm without specification of site ([place] [address] )     Skin cancer, basal cell     right eye area    Stage 4 lung cancer ([place] [address] )      Past Surgical History:   Procedure Laterality Date    APPENDECTOMY      BRONCHOSCOPY N/A 11/17/2016    Procedure: BRONCHOSCOPY FLEXIBLE;  Surgeon: Rudy Mercedes MD;  [facility];  Service:    [address] HYSTERECTOMY  2000    Total abdominal    LARYNGOSCOPY      Flexible Fiberoptic, (Therapeutic), Resolved: 11/17/16    MOHS SURGERY      Micrographic Surgery Face    AL BRONCHOSCOPY NEEDLE BX TRACHEA MAIN STEM&/BRON N/A 11/17/2016    Procedure: EBUS; FROZEN SECTION ;  Surgeon: Rudy Mercdees MD;  Location[facility];  Service: Thoracic    AL [address] N/A 5/24/2017    Procedure: BRONCHOSCOPY FLEXIBLE;  Surgeon: Achille Crigler, MD;  Location: [facility];   Service: Pulmonary    TONSILECTOMY AND ADNOIDECTOMY      TOTAL KNEE ARTHROPLASTY Right 09/23/2014      Family History:     Family History   Problem Relation Age of Onset    Heart disease Father         cardiac disorder    Hypertension Father     Arthritis Father     Stroke Father         cerebrovascular accident    Diabetes Mother     Heart disease Mother     Stroke Mother         cerebrovascular accident   [employer] Dementia Mother     Hypertension Mother     Thyroid disease Mother     Cancer Maternal Grandfather         of unknown origin    Cancer Family     Depression Family     Diabetes Family     Hyperlipidemia Family         essential    Heart disease Family     Hypertension Family     Stroke Family         syndrome    Lung cancer Paternal Uncle       Social History:     Social History     Socioeconomic History    Marital status:      Spouse name: Not on file    Number of children: Not on file    Years of education: Not on file    Highest education level: Not on file   Occupational History    Occupation: retired   Social Needs    Financial resource strain: Not on file    Food insecurity:     Worry: Not on file     Inability: Not on file   Glassful needs:     Medical: Not on file     Non-medical: Not on file   Tobacco Use    Smoking status: Former Smoker     Packs/day: 1 00     Years: 50 00     Pack years: 50 00     Types: Cigarettes     Start date:      Last attempt to quit:      Years since quittin 7    Smokeless tobacco: Never Used    Tobacco comment: Quit in    Substance and Sexual Activity    Alcohol use: No    Drug use: No    Sexual activity: Not Currently   Lifestyle    Physical activity:     Days per week: Not on file     Minutes per session: Not on file    Stress: Not on file   Relationships    Social connections:     Talks on phone: Not on file     Gets together: Not on file     Attends Buddhist service: Not on file     Active member of club or organization: Not on file     Attends meetings of clubs or organizations: Not on file     Relationship status: Not on file    Intimate partner violence:     Fear of current or ex partner: Not on file     Emotionally abused: Not on file     Physically abused: Not on file     Forced sexual activity: Not on file   Other Topics Concern    Not on file   Social History Narrative    Lives with family  Daily caffeinated coffee consumption       Medications and Allergies:     Current Outpatient Medications   Medication Sig Dispense Refill    acetaminophen (TYLENOL) 325 mg tablet Take 650 mg by mouth every 6 (six) hours as needed for mild pain      apixaban (ELIQUIS) 5 mg TAKE 1 TABLET TWICE A  tablet 3    Calcium Carb-Cholecalciferol 600-800 MG-UNIT TABS Take 1 tablet by mouth 2 (two) times a day      Cholecalciferol (VITAMIN D) 2000 units CAPS Take by mouth      cyanocobalamin (VITAMIN B-12) 100 mcg tablet Take by mouth daily      dexamethasone (DECADRON) 4 mg tablet TAKE ONE TAB TWICE A DAY WITH FOOD, DAY BEFORE,DAY OF,DAY AFTER CHEMO 30 tablet 1    Folic Acid 0 8 MG CAPS       metoprolol tartrate (LOPRESSOR) 25 mg tablet TAKE 1 TABLET EVERY 12     HOURS 180 tablet 3    omeprazole (PriLOSEC) 20 mg delayed release capsule TAKE 1 CAPSULE DAILY 90 capsule 3    predniSONE 10 mg tablet Take 5 mg by mouth daily      sotalol (BETAPACE) 80 mg tablet TAKE 1 TABLET EVERY 12     HOURS 180 tablet 3    TRADJENTA 5 MG TABS TAKE 1 TABLET DAILY 90 tablet 3    venlafaxine (EFFEXOR) 37 5 mg tablet Take 1 tablet (37 5 mg total) by mouth daily 180 tablet 1    metFORMIN (GLUCOPHAGE) 1000 MG tablet Take 1 tablet (1,000 mg total) by mouth 2 (two) times a day with meals for 90 days 180 tablet 3     No current facility-administered medications for this visit        Allergies   Allergen Reactions    Amoxicillin Rash and Hives    Cardizem [Diltiazem] Rash     Rash      Statins Myalgia     Severe muscle aching  Terrible pains      Immunizations:     Immunization History   Administered Date(s) Administered    H1N1, All Formulations 11/05/2009    INFLUENZA 10/27/2014, 09/21/2015, 10/19/2016, 10/02/2017, 10/10/2018    Influenza TIV (IM) 10/27/2014, 10/19/2016, 10/02/2017    Pneumococcal Conjugate 13-Valent 12/03/2015    Pneumococcal Polysaccharide PPV23 09/26/2014    Tdap 10/06/2018    Zoster 10/03/2013    influenza, trivalent, adjuvanted 08/26/2019      Health Maintenance:         Topic Date Due    MAMMOGRAM  02/28/2017    CRC Screening: Colonoscopy  04/30/2028    Hepatitis C Screening  Completed     There are no preventive care reminders to display for this patient  Medicare Health Risk Assessment:     /74   Pulse 69   Temp 97 7 °F (36 5 °C)   Resp 16   Ht 5' 4 17" (1 63 m)   Wt 100 kg (221 lb 6 4 oz)   LMP  (LMP Unknown)   SpO2 98%   BMI 37 80 kg/m²      Nnamdi Peterson is here for her Subsequent Wellness visit  Health Risk Assessment:   Patient rates overall health as good  Patient feels that their physical health rating is same  Eyesight was rated as same  Hearing was rated as same  Patient feels that their emotional and mental health rating is same  Pain experienced in the last 7 days has been none  Patient states that she has experienced no weight loss or gain in last 6 months  Fall Risk Screening: In the past year, patient has experienced: no history of falling in past year      Urinary Incontinence Screening:   Patient has not leaked urine accidently in the last six months  Home Safety:  Patient does not have trouble with stairs inside or outside of their home  Patient has working smoke alarms and has working carbon monoxide detector  Home safety hazards include: none  Nutrition:   Current diet is Diabetic  Medications:   Patient is currently taking over-the-counter supplements  OTC medications include: see medication list  Patient is able to manage medications  Activities of Daily Living (ADLs)/Instrumental Activities of Daily Living (IADLs):   Walk and transfer into and out of bed and chair?: Yes  Dress and groom yourself?: Yes    Bathe or shower yourself?: Yes    Feed yourself?  Yes  Do your laundry/housekeeping?: Yes  Manage your money, pay your bills and track your expenses?: Yes  Make your own meals?: Yes    Do your own shopping?: Yes    Previous Hospitalizations: Any hospitalizations or ED visits within the last 12 months?: No      Advance Care Planning:   Living will: Yes    Durable POA for healthcare:  Yes    Advanced directive: Yes      Cognitive Screening:   Provider or family/friend/caregiver concerned regarding cognition?: No    PREVENTIVE SCREENINGS      Cardiovascular Screening:    General: Screening Not Indicated and History Lipid Disorder      Diabetes Screening:     General: Screening Not Indicated and History Diabetes      Colorectal Cancer Screening:     General: Screening Current      Cervical Cancer Screening:    General: Screening Not Indicated      Lung Cancer Screening:     General: Screening Not Indicated and History Lung Cancer      Hepatitis C Screening:    General: Screening Current      Cheryl Heading, DO

## 2019-10-02 NOTE — PATIENT INSTRUCTIONS
Medicare Preventive Visit Patient Instructions  Thank you for completing your Welcome to Medicare Visit or Medicare Annual Wellness Visit today  Your next wellness visit will be due in one year (10/2/2020)  The screening/preventive services that you may require over the next 5-10 years are detailed below  Some tests may not apply to you based off risk factors and/or age  Screening tests ordered at today's visit but not completed yet may show as past due  Also, please note that scanned in results may not display below  Preventive Screenings:  Service Recommendations Previous Testing/Comments   Colorectal Cancer Screening  * Colonoscopy    * Fecal Occult Blood Test (FOBT)/Fecal Immunochemical Test (FIT)  * Fecal DNA/Cologuard Test  * Flexible Sigmoidoscopy Age: 54-65 years old   Colonoscopy: every 10 years (may be performed more frequently if at higher risk)  OR  FOBT/FIT: every 1 year  OR  Cologuard: every 3 years  OR  Sigmoidoscopy: every 5 years  Screening may be recommended earlier than age 48 if at higher risk for colorectal cancer  Also, an individualized decision between you and your healthcare provider will decide whether screening between the ages of 74-80 would be appropriate  Colonoscopy: 11/17/2004  FOBT/FIT: Not on file  Cologuard: Not on file  Sigmoidoscopy: Not on file         Breast Cancer Screening Age: 36 years old  Frequency: every 1-2 years  Not required if history of left and right mastectomy Mammogram: 02/29/2016       Cervical Cancer Screening Between the ages of 21-29, pap smear recommended once every 3 years  Between the ages of 33-67, can perform pap smear with HPV co-testing every 5 years     Recommendations may differ for women with a history of total hysterectomy, cervical cancer, or abnormal pap smears in past  Pap Smear: Not on file       Hepatitis C Screening Once for adults born between [facility]  More frequently in patients at high risk for Hepatitis C Hep C Antibody: 10/01/2019       Diabetes Screening 1-2 times per year if you're at risk for diabetes or have pre-diabetes Fasting glucose: 172 mg/dL   A1C: 8 1 %       Cholesterol Screening Once every 5 years if you don't have a lipid disorder  May order more often based on risk factors  Lipid panel: 10/01/2019         Other Preventive Screenings Covered by Medicare:  1  Abdominal Aortic Aneurysm (AAA) Screening: covered once if your at risk  You're considered to be at risk if you have a family history of AAA  2  Lung Cancer Screening: covers low dose CT scan once per year if you meet all of the following conditions: (1) Age 50-69; (2) No signs or symptoms of lung cancer; (3) Current smoker or have quit smoking within the last 15 years; (4) You have a tobacco smoking history of at least 30 pack years (packs per day multiplied by number of years you smoked); (5) You get a written order from a healthcare provider  3  Glaucoma Screening: covered annually if you're considered high risk: (1) You have diabetes OR (2) Family history of glaucoma OR (3)  aged 48 and older OR (3)  American aged 72 and older  3  Osteoporosis Screening: covered every 2 years if you meet one of the following conditions: (1) You're estrogen deficient and at risk for osteoporosis based off medical history and other findings; (2) Have a vertebral abnormality; (3) On glucocorticoid therapy for more than 3 months; (4) Have primary hyperparathyroidism; (5) On osteoporosis medications and need to assess response to drug therapy  · Last bone density test (DXA Scan): 06/17/2015  5  HIV Screening: covered annually if you're between the age of 12-76  Also covered annually if you are younger than 13 and older than 72 with risk factors for HIV infection  For pregnant patients, it is covered up to 3 times per pregnancy      Immunizations:  Immunization Recommendations   Influenza Vaccine Annual influenza vaccination during flu season is recommended for all persons aged >= 6 months who do not have contraindications   Pneumococcal Vaccine (Prevnar and Pneumovax)  * Prevnar = PCV13  * Pneumovax = PPSV23   Adults 25-60 years old: 1-3 doses may be recommended based on certain risk factors  Adults 72 years old: Prevnar (PCV13) vaccine recommended followed by Pneumovax (PPSV23) vaccine  If already received PPSV23 since turning 65, then PCV13 recommended at least one year after PPSV23 dose  Hepatitis B Vaccine 3 dose series if at intermediate or high risk (ex: diabetes, end stage renal disease, liver disease)   Tetanus (Td) Vaccine - COST NOT COVERED BY MEDICARE PART B Following completion of primary series, a booster dose should be given every 10 years to maintain immunity against tetanus  Td may also be given as tetanus wound prophylaxis  Tdap Vaccine - COST NOT COVERED BY MEDICARE PART B Recommended at least once for all adults  For pregnant patients, recommended with each pregnancy  Shingles Vaccine (Shingrix) - COST NOT COVERED BY MEDICARE PART B  2 shot series recommended in those aged 48 and above     Health Maintenance Due:      Topic Date Due    MAMMOGRAM  02/28/2017    CRC Screening: Colonoscopy  04/30/2028    Hepatitis C Screening  Completed     Immunizations Due:  There are no preventive care reminders to display for this patient  Advance Directives   What are advance directives? Advance directives are legal documents that state your wishes and plans for medical care  These plans are made ahead of time in case you lose your ability to make decisions for yourself  Advance directives can apply to any medical decision, such as the treatments you want, and if you want to donate organs  What are the types of advance directives? There are many types of advance directives, and each state has rules about how to use them  You may choose a combination of any of the following:  · Living will:   This is a written record of the treatment you want  You can also choose which treatments you do not want, which to limit, and which to stop at a certain time  This includes surgery, medicine, IV fluid, and tube feedings  · Durable power of  for healthcare [facility]): This is a written record that states who you want to make healthcare choices for you when you are unable to make them for yourself  This person, called a proxy, is usually a family member or a friend  You may choose more than 1 proxy  · Do not resuscitate (DNR) order:  A DNR order is used in case your heart stops beating or you stop breathing  It is a request not to have certain forms of treatment, such as CPR  A DNR order may be included in other types of advance directives  · Medical directive: This covers the care that you want if you are in a coma, near death, or unable to make decisions for yourself  You can list the treatments you want for each condition  Treatment may include pain medicine, surgery, blood transfusions, dialysis, IV or tube feedings, and a ventilator (breathing machine)  · Values history: This document has questions about your views, beliefs, and how you feel and think about life  This information can help others choose the care that you would choose  Why are advance directives important? An advance directive helps you control your care  Although spoken wishes may be used, it is better to have your wishes written down  Spoken wishes can be misunderstood, or not followed  Treatments may be given even if you do not want them  An advance directive may make it easier for your family to make difficult choices about your care  Weight Management   Why it is important to manage your weight:  Being overweight increases your risk of health conditions such as heart disease, high blood pressure, type 2 diabetes, and certain types of cancer  It can also increase your risk for osteoarthritis, sleep apnea, and other respiratory problems  Aim for a slow, steady weight loss  Even a small amount of weight loss can lower your risk of health problems  How to lose weight safely:  A safe and healthy way to lose weight is to eat fewer calories and get regular exercise  You can lose up about 1 pound a week by decreasing the number of calories you eat by 500 calories each day  Healthy meal plan for weight management:  A healthy meal plan includes a variety of foods, contains fewer calories, and helps you stay healthy  A healthy meal plan includes the following:  · Eat whole-grain foods more often  A healthy meal plan should contain fiber  Fiber is the part of grains, fruits, and vegetables that is not broken down by your body  Whole-grain foods are healthy and provide extra fiber in your diet  Some examples of whole-grain foods are whole-wheat breads and pastas, oatmeal, brown rice, and bulgur  · Eat a variety of vegetables every day  Include dark, leafy greens such as spinach, kale, gabriela greens, and mustard greens  Eat yellow and orange vegetables such as carrots, sweet potatoes, and winter squash  · Eat a variety of fruits every day  Choose fresh or canned fruit (canned in its own juice or light syrup) instead of juice  Fruit juice has very little or no fiber  · Eat low-fat dairy foods  Drink fat-free (skim) milk or 1% milk  Eat fat-free yogurt and low-fat cottage cheese  Try low-fat cheeses such as mozzarella and other reduced-fat cheeses  · Choose meat and other protein foods that are low in fat  Choose beans or other legumes such as split peas or lentils  Choose fish, skinless poultry (chicken or turkey), or lean cuts of red meat (beef or pork)  Before you cook meat or poultry, cut off any visible fat  · Use less fat and oil  Try baking foods instead of frying them  Add less fat, such as margarine, sour cream, regular salad dressing and mayonnaise to foods  Eat fewer high-fat foods   Some examples of high-fat foods include french fries, doughnuts, ice cream, and cakes   · Eat fewer sweets  Limit foods and drinks that are high in sugar  This includes candy, cookies, regular soda, and sweetened drinks  Exercise:  Exercise at least 30 minutes per day on most days of the week  Some examples of exercise include walking, biking, dancing, and swimming  You can also fit in more physical activity by taking the stairs instead of the elevator or parking farther away from stores  Ask your healthcare provider about the best exercise plan for you  © Copyright [employer] 2018 Information is for End User's use only and may not be sold, redistributed or otherwise used for commercial purposes   All illustrations and images included in CareNotes® are the copyrighted property of [employer]  or [address] [place] [address]

## 2019-10-02 NOTE — PROGRESS NOTES
Assessment/Plan:    1  Type 2 diabetes mellitus without complication, without long-term current use of insulin (HCC)  Assessment & Plan:    Lab Results   Component Value Date    HGBA1C 8 1 (H) 10/01/2019   add jardiance  Recheck in 3 month    Orders:  -     Empagliflozin 10 MG TABS; Take 1 tablet (10 mg total) by mouth every morning  -     Hemoglobin A1C; Future; Expected date: 01/01/2020    2  Medicare annual wellness visit, subsequent  Assessment & Plan:  Mammogram ordered however pt gets Pet and CT's      3  Encounter for screening mammogram for breast cancer  -     Mammo screening bilateral w cad; Future; Expected date: 10/02/2019    4  Atrial fibrillation, unspecified type [facility])  Assessment & Plan: On eliquis      5  Benign essential hypertension  Assessment & Plan:  stable      6  Malignant neoplasm of bone with metastases [facility])  Assessment & Plan:  Seeing Dr Gilbert Oseguera on current therapy  Recent CT      7  Major depressive disorder with single episode, in full remission [facility])  Assessment & Plan:  Stable on effexor      8  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)    9  Chronic diastolic heart failure (HCC)  Assessment & Plan:  Wt Readings from Last 3 Encounters:   10/02/19 100 kg (221 lb 6 4 oz)   09/13/19 102 kg (224 lb 8 oz)   08/23/19 100 kg (221 lb 8 oz)     Was due to afib  Stable and seeing cardiology          10  BMI 37 0-37 9, adult  Assessment & Plan:  Discussed diet           BMI Counseling: Body mass index is 37 8 kg/m²  Discussed the patient's BMI with her  The BMI is above normal  Nutrition recommendations include reducing portion sizes and 3-5 servings of fruits/vegetables daily  Exercise recommendations include exercising 3-5 times per week  Patient Instructions       Medicare Preventive Visit Patient Instructions  Thank you for completing your Welcome to Medicare Visit or Medicare Annual Wellness Visit today   Your next wellness visit will be due in one year (10/2/2020)  The screening/preventive services that you may require over the next 5-10 years are detailed below  Some tests may not apply to you based off risk factors and/or age  Screening tests ordered at today's visit but not completed yet may show as past due  Also, please note that scanned in results may not display below  Preventive Screenings:  Service Recommendations Previous Testing/Comments   Colorectal Cancer Screening  * Colonoscopy    * Fecal Occult Blood Test (FOBT)/Fecal Immunochemical Test (FIT)  * Fecal DNA/Cologuard Test  * Flexible Sigmoidoscopy Age: 54-65 years old   Colonoscopy: every 10 years (may be performed more frequently if at higher risk)  OR  FOBT/FIT: every 1 year  OR  Cologuard: every 3 years  OR  Sigmoidoscopy: every 5 years  Screening may be recommended earlier than age 48 if at higher risk for colorectal cancer  Also, an individualized decision between you and your healthcare provider will decide whether screening between the ages of 74-80 would be appropriate  Colonoscopy: 11/17/2004  FOBT/FIT: Not on file  Cologuard: Not on file  Sigmoidoscopy: Not on file         Breast Cancer Screening Age: 36 years old  Frequency: every 1-2 years  Not required if history of left and right mastectomy Mammogram: 02/29/2016       Cervical Cancer Screening Between the ages of 21-29, pap smear recommended once every 3 years  Between the ages of 33-67, can perform pap smear with HPV co-testing every 5 years     Recommendations may differ for women with a history of total hysterectomy, cervical cancer, or abnormal pap smears in past  Pap Smear: Not on file       Hepatitis C Screening Once for adults born between 1945 and 1965  More frequently in patients at high risk for Hepatitis C Hep C Antibody: 10/01/2019       Diabetes Screening 1-2 times per year if you're at risk for diabetes or have pre-diabetes Fasting glucose: 172 mg/dL   A1C: 8 1 %       Cholesterol Screening Once every 5 years if you don't have a lipid disorder  May order more often based on risk factors  Lipid panel: 10/01/2019         Other Preventive Screenings Covered by Medicare:  1  Abdominal Aortic Aneurysm (AAA) Screening: covered once if your at risk  You're considered to be at risk if you have a family history of AAA  2  Lung Cancer Screening: covers low dose CT scan once per year if you meet all of the following conditions: (1) Age 50-69; (2) No signs or symptoms of lung cancer; (3) Current smoker or have quit smoking within the last 15 years; (4) You have a tobacco smoking history of at least 30 pack years (packs per day multiplied by number of years you smoked); (5) You get a written order from a healthcare provider  3  Glaucoma Screening: covered annually if you're considered high risk: (1) You have diabetes OR (2) Family history of glaucoma OR (3)  aged 48 and older OR (3)  American aged 72 and older  3  Osteoporosis Screening: covered every 2 years if you meet one of the following conditions: (1) You're estrogen deficient and at risk for osteoporosis based off medical history and other findings; (2) Have a vertebral abnormality; (3) On glucocorticoid therapy for more than 3 months; (4) Have primary hyperparathyroidism; (5) On osteoporosis medications and need to assess response to drug therapy  · Last bone density test (DXA Scan): 06/17/2015  5  HIV Screening: covered annually if you're between the age of 12-76  Also covered annually if you are younger than 13 and older than 72 with risk factors for HIV infection  For pregnant patients, it is covered up to 3 times per pregnancy      Immunizations:  Immunization Recommendations   Influenza Vaccine Annual influenza vaccination during flu season is recommended for all persons aged >= 6 months who do not have contraindications   Pneumococcal Vaccine (Prevnar and Pneumovax)  * Prevnar = PCV13  * Pneumovax = PPSV23   Adults 25-60 years old: 1-3 doses may be recommended based on certain risk factors  Adults 72 years old: Prevnar (PCV13) vaccine recommended followed by Pneumovax (PPSV23) vaccine  If already received PPSV23 since turning 65, then PCV13 recommended at least one year after PPSV23 dose  Hepatitis B Vaccine 3 dose series if at intermediate or high risk (ex: diabetes, end stage renal disease, liver disease)   Tetanus (Td) Vaccine - COST NOT COVERED BY MEDICARE PART B Following completion of primary series, a booster dose should be given every 10 years to maintain immunity against tetanus  Td may also be given as tetanus wound prophylaxis  Tdap Vaccine - COST NOT COVERED BY MEDICARE PART B Recommended at least once for all adults  For pregnant patients, recommended with each pregnancy  Shingles Vaccine (Shingrix) - COST NOT COVERED BY MEDICARE PART B  2 shot series recommended in those aged 48 and above     Health Maintenance Due:      Topic Date Due    MAMMOGRAM  02/28/2017    CRC Screening: Colonoscopy  04/30/2028    Hepatitis C Screening  Completed     Immunizations Due:  There are no preventive care reminders to display for this patient  Advance Directives   What are advance directives? Advance directives are legal documents that state your wishes and plans for medical care  These plans are made ahead of time in case you lose your ability to make decisions for yourself  Advance directives can apply to any medical decision, such as the treatments you want, and if you want to donate organs  What are the types of advance directives? There are many types of advance directives, and each state has rules about how to use them  You may choose a combination of any of the following:  · Living will: This is a written record of the treatment you want  You can also choose which treatments you do not want, which to limit, and which to stop at a certain time  This includes surgery, medicine, IV fluid, and tube feedings     · Durable power of  for [facility]): This is a written record that states who you want to make healthcare choices for you when you are unable to make them for yourself  This person, called a proxy, is usually a family member or a friend  You may choose more than 1 proxy  · Do not resuscitate (DNR) order:  A DNR order is used in case your heart stops beating or you stop breathing  It is a request not to have certain forms of treatment, such as CPR  A DNR order may be included in other types of advance directives  · Medical directive: This covers the care that you want if you are in a coma, near death, or unable to make decisions for yourself  You can list the treatments you want for each condition  Treatment may include pain medicine, surgery, blood transfusions, dialysis, IV or tube feedings, and a ventilator (breathing machine)  · Values history: This document has questions about your views, beliefs, and how you feel and think about life  This information can help others choose the care that you would choose  Why are advance directives important? An advance directive helps you control your care  Although spoken wishes may be used, it is better to have your wishes written down  Spoken wishes can be misunderstood, or not followed  Treatments may be given even if you do not want them  An advance directive may make it easier for your family to make difficult choices about your care  Weight Management   Why it is important to manage your weight:  Being overweight increases your risk of health conditions such as heart disease, high blood pressure, type 2 diabetes, and certain types of cancer  It can also increase your risk for osteoarthritis, sleep apnea, and other respiratory problems  Aim for a slow, steady weight loss  Even a small amount of weight loss can lower your risk of health problems  How to lose weight safely:  A safe and healthy way to lose weight is to eat fewer calories and get regular exercise   You can lose up about 1 pound a week by decreasing the number of calories you eat by 500 calories each day  Healthy meal plan for weight management:  A healthy meal plan includes a variety of foods, contains fewer calories, and helps you stay healthy  A healthy meal plan includes the following:  · Eat whole-grain foods more often  A healthy meal plan should contain fiber  Fiber is the part of grains, fruits, and vegetables that is not broken down by your body  Whole-grain foods are healthy and provide extra fiber in your diet  Some examples of whole-grain foods are whole-wheat breads and pastas, oatmeal, brown rice, and bulgur  · Eat a variety of vegetables every day  Include dark, leafy greens such as spinach, kale, gabriela greens, and mustard greens  Eat yellow and orange vegetables such as carrots, sweet potatoes, and winter squash  · Eat a variety of fruits every day  Choose fresh or canned fruit (canned in its own juice or light syrup) instead of juice  Fruit juice has very little or no fiber  · Eat low-fat dairy foods  Drink fat-free (skim) milk or 1% milk  Eat fat-free yogurt and low-fat cottage cheese  Try low-fat cheeses such as mozzarella and other reduced-fat cheeses  · Choose meat and other protein foods that are low in fat  Choose beans or other legumes such as split peas or lentils  Choose fish, skinless poultry (chicken or turkey), or lean cuts of red meat (beef or pork)  Before you cook meat or poultry, cut off any visible fat  · Use less fat and oil  Try baking foods instead of frying them  Add less fat, such as margarine, sour cream, regular salad dressing and mayonnaise to foods  Eat fewer high-fat foods  Some examples of high-fat foods include french fries, doughnuts, ice cream, and cakes  · Eat fewer sweets  Limit foods and drinks that are high in sugar  This includes candy, cookies, regular soda, and sweetened drinks  Exercise:  Exercise at least 30 minutes per day on most days of the week  Some examples of exercise include walking, biking, dancing, and swimming  You can also fit in more physical activity by taking the stairs instead of the elevator or parking farther away from stores  Ask your healthcare provider about the best exercise plan for you  © Copyright [employer] 2018 Information is for End User's use only and may not be sold, redistributed or otherwise used for commercial purposes  All illustrations and images included in CareNotes® are the copyrighted property of A [employer] A M [employer]  or [employer]         Return in 1 year (on 10/2/2020)  Subjective:      Patient ID: Omar Kurtz is a 68 y o  female  Chief Complaint   Patient presents with   [facility] Wellness Visit     Patient here for Medicare wellness exam        Here for follow up and AWV  Feeling good  Still getting chemo    Hypertension   This is a chronic problem  The current episode started more than 1 year ago  The problem is unchanged  The problem is controlled  There are no associated agents to hypertension  Past treatments include beta blockers  The current treatment provides significant improvement  There are no compliance problems  Hyperlipidemia   This is a chronic problem  The current episode started more than 1 year ago  The problem is controlled  Recent lipid tests were reviewed and are normal  She is currently on no antihyperlipidemic treatment  The current treatment provides significant improvement of lipids  There are no compliance problems  Diabetes   She presents for her follow-up diabetic visit  She has type 2 diabetes mellitus  Her disease course has been worsening  There are no hypoglycemic associated symptoms  There are no diabetic associated symptoms  There are no hypoglycemic complications  Symptoms are worsening  There are no diabetic complications  Risk factors for coronary artery disease include dyslipidemia and diabetes mellitus   Current diabetic treatment includes oral agent (dual therapy)  She is compliant with treatment all of the time  Her weight is stable  When asked about meal planning, she reported none  She has not had a previous visit with a dietitian  She rarely participates in exercise  There is no change in her home blood glucose trend  An ACE inhibitor/angiotensin II receptor blocker is not being taken  She does not see a podiatrist Eye exam is current  The following portions of the patient's history were reviewed and updated as appropriate: allergies, current medications, past family history, past medical history, past social history, past surgical history and problem list     Review of Systems   Constitutional: Negative  HENT: Negative  Eyes: Negative  Respiratory: Negative  Cardiovascular: Negative  Gastrointestinal: Negative  Endocrine: Negative  Genitourinary: Negative  Musculoskeletal: Negative  Skin: Negative  Allergic/Immunologic: Negative  Neurological: Negative  Hematological: Negative  Psychiatric/Behavioral: Negative            Current Outpatient Medications   Medication Sig Dispense Refill    acetaminophen (TYLENOL) 325 mg tablet Take 650 mg by mouth every 6 (six) hours as needed for mild pain      apixaban (ELIQUIS) 5 mg TAKE 1 TABLET TWICE A  tablet 3    Calcium Carb-Cholecalciferol 600-800 MG-UNIT TABS Take 1 tablet by mouth 2 (two) times a day      Cholecalciferol (VITAMIN D) 2000 units CAPS Take by mouth      cyanocobalamin (VITAMIN B-12) 100 mcg tablet Take by mouth daily      dexamethasone (DECADRON) 4 mg tablet TAKE ONE TAB TWICE A DAY WITH FOOD, DAY BEFORE,DAY OF,DAY AFTER CHEMO 30 tablet 1    Folic Acid 0 8 MG CAPS       metoprolol tartrate (LOPRESSOR) 25 mg tablet TAKE 1 TABLET EVERY 12     HOURS 180 tablet 3    omeprazole (PriLOSEC) 20 mg delayed release capsule TAKE 1 CAPSULE DAILY 90 capsule 3    predniSONE 10 mg tablet Take 5 mg by mouth daily      sotalol (BETAPACE) 80 mg tablet TAKE 1 TABLET EVERY 12     HOURS 180 tablet 3    TRADJENTA 5 MG TABS TAKE 1 TABLET DAILY 90 tablet 3    venlafaxine (EFFEXOR) 37 5 mg tablet Take 1 tablet (37 5 mg total) by mouth daily 180 tablet 1    Empagliflozin 10 MG TABS Take 1 tablet (10 mg total) by mouth every morning 30 tablet 5    metFORMIN (GLUCOPHAGE) 1000 MG tablet Take 1 tablet (1,000 mg total) by mouth 2 (two) times a day with meals for 90 days 180 tablet 3     No current facility-administered medications for this visit  Objective:    /74   Pulse 69   Temp 97 7 °F (36 5 °C)   Resp 16   Ht 5' 4 17" (1 63 m)   Wt 100 kg (221 lb 6 4 oz)   LMP  (LMP Unknown)   SpO2 98%   BMI 37 80 kg/m²        Physical Exam   Constitutional: She appears well-developed and well-nourished  HENT:   Head: Normocephalic and atraumatic  Eyes: Pupils are equal, round, and reactive to light  EOM are normal    Neck: Normal range of motion  Neck supple  Cardiovascular: Normal rate, regular rhythm, normal heart sounds and intact distal pulses  Pulses are no weak pulses  Pulses:       Dorsalis pedis pulses are 2+ on the right side, and 2+ on the left side  Posterior tibial pulses are 2+ on the right side, and 2+ on the left side  Pulmonary/Chest: Effort normal and breath sounds normal    Abdominal: Soft  Bowel sounds are normal    Musculoskeletal: Normal range of motion  Feet:   Right Foot:   Skin Integrity: Negative for ulcer, skin breakdown, erythema, warmth, callus or dry skin  Left Foot:   Skin Integrity: Negative for ulcer, skin breakdown, erythema, warmth, callus or dry skin  Neurological: She is alert  Skin: Skin is warm  Capillary refill takes less than 2 seconds  Psychiatric: She has a normal mood and affect  Nursing note and vitals reviewed  Patient's shoes and socks removed  Right Foot/Ankle   Right Foot Inspection  Skin Exam: skin normal and skin intact no dry skin, no warmth, no callus, no erythema, no maceration, no abnormal color, no pre-ulcer, no ulcer and no callus                          Toe Exam: ROM and strength within normal limits  Sensory     Proprioception: intact   Monofilament testing: intact  Vascular  Capillary refills: < 3 seconds  The right DP pulse is 2+  The right PT pulse is 2+  Left Foot/Ankle  Left Foot Inspection  Skin Exam: skin normal and skin intactno dry skin, no warmth, no erythema, no maceration, normal color, no pre-ulcer, no ulcer and no callus                         Toe Exam: ROM and strength within normal limits                   Sensory     Proprioception: intact  Monofilament: intact  Vascular  Capillary refills: < 3 seconds  The left DP pulse is 2+  The left PT pulse is 2+  Assign Risk Category:  No deformity present; No loss of protective sensation;  No weak pulses       Risk: 0      Jaquelin Tariq,

## 2019-10-02 NOTE — ASSESSMENT & PLAN NOTE
Wt Readings from Last 3 Encounters:   10/02/19 100 kg (221 lb 6 4 oz)   09/13/19 102 kg (224 lb 8 oz)   08/23/19 100 kg (221 lb 8 oz)     Was due to afib  Stable and seeing cardiology

## 2019-10-03 ENCOUNTER — TELEPHONE (OUTPATIENT)
Dept: HEMATOLOGY ONCOLOGY | Facility: CLINIC | Age: 69
End: 2019-10-03

## 2019-10-03 NOTE — TELEPHONE ENCOUNTER
Ritika Castaneda from [facility] called with significant finding on the patients CT   [phone]

## 2019-10-04 ENCOUNTER — HOSPITAL ENCOUNTER (OUTPATIENT)
Dept: INFUSION CENTER | Facility: CLINIC | Age: 69
Discharge: HOME | End: 2019-10-04
Payer: MEDICARE

## 2019-10-04 VITALS
TEMPERATURE: 98.2 F | OXYGEN SATURATION: 98 % | HEART RATE: 75 BPM | DIASTOLIC BLOOD PRESSURE: 68 MMHG | SYSTOLIC BLOOD PRESSURE: 132 MMHG | WEIGHT: 220.5 LBS | RESPIRATION RATE: 16 BRPM | HEIGHT: 64 IN | BODY MASS INDEX: 37.65 KG/M2

## 2019-10-04 DIAGNOSIS — C34.92 ADENOCARCINOMA OF LEFT LUNG, STAGE 4 (HCC): Primary | ICD-10-CM

## 2019-10-04 PROCEDURE — 96409 CHEMO IV PUSH SNGL DRUG: CPT

## 2019-10-04 PROCEDURE — 96367 TX/PROPH/DG ADDL SEQ IV INF: CPT

## 2019-10-04 RX ORDER — SODIUM CHLORIDE 9 MG/ML
20 INJECTION, SOLUTION INTRAVENOUS ONCE
Status: COMPLETED | OUTPATIENT
Start: 2019-10-04 | End: 2019-10-04

## 2019-10-04 RX ADMIN — SODIUM CHLORIDE 1000 MG: 9 INJECTION, SOLUTION INTRAVENOUS at 11:29

## 2019-10-04 RX ADMIN — ONDANSETRON 16 MG: 2 INJECTION INTRAMUSCULAR; INTRAVENOUS at 10:53

## 2019-10-04 RX ADMIN — SODIUM CHLORIDE 20 ML/HR: 0.9 INJECTION, SOLUTION INTRAVENOUS at 10:54

## 2019-10-04 NOTE — PROGRESS NOTES
Patient presents today for treatment with Alimta  Patient offers no complaints  VSS  Labs within parameters to treat  Peripheral IV inserted without incident

## 2019-10-04 NOTE — PROGRESS NOTES
Patient tolerated Alimta infusion without incident  Peripheral IV removed  Patient's next appointment confirmed  AVS declined

## 2019-10-07 ENCOUNTER — OFFICE VISIT (OUTPATIENT)
Dept: HEMATOLOGY ONCOLOGY | Facility: CLINIC | Age: 69
End: 2019-10-07
Payer: MEDICARE

## 2019-10-07 VITALS
DIASTOLIC BLOOD PRESSURE: 70 MMHG | TEMPERATURE: 97.3 F | SYSTOLIC BLOOD PRESSURE: 140 MMHG | OXYGEN SATURATION: 97 % | WEIGHT: 220 LBS | RESPIRATION RATE: 16 BRPM | HEART RATE: 72 BPM | HEIGHT: 64 IN | BODY MASS INDEX: 37.56 KG/M2

## 2019-10-07 DIAGNOSIS — C34.91 MALIGNANT NEOPLASM OF UNSPECIFIED PART OF RIGHT BRONCHUS OR LUNG (HCC): ICD-10-CM

## 2019-10-07 DIAGNOSIS — C34.92 ADENOCARCINOMA OF LEFT LUNG, STAGE 4 (HCC): Primary | ICD-10-CM

## 2019-10-07 PROCEDURE — 99214 OFFICE O/P EST MOD 30 MIN: CPT | Performed by: INTERNAL MEDICINE

## 2019-10-07 RX ORDER — SODIUM CHLORIDE 9 MG/ML
20 INJECTION, SOLUTION INTRAVENOUS ONCE
Status: CANCELLED | OUTPATIENT
Start: 2019-11-16

## 2019-10-07 NOTE — PROGRESS NOTES
Hematology Outpatient Follow - Up Note  Norbert Griffith 68 y o  female MRN: @ Encounter: [id number]        Date:  10/7/2019        Assessment/ Plan:    Adenocarcinoma of the lung primary in the left upper lobe of the lung with left scapula involvement stage IV diagnosed on 08/2016, PDL expression more than 50%, negative for EGFR, Ross 1 mutation, ALK gene rearrangement    Treated initially with Pembrolizumab complicated with pneumonitis and later on continued on nivolumab with prednisone 10 mg p o  Daily with excellent response and disease progression in October 2018 of the left scapula area with pain, no new lesions on the PET scan, she received additional course of radiation therapy to the left scapula and then treated with Alimta 500 milligram/meter squared, carboplatin AUC 5 after 3 cycles CT scan in January 2019 showed stable disease, subsequent CT scan showed stable disease    Currently on Alimta maintenance 500 milligram/meter squared every 3 weeks    Pembrolizumab was discontinued on 07/30/2019 because of pneumonitis    Prednisone 10 mg p o  Daily    Most recent CT scan on 10/01/2019 showed a small satellite lesion measuring 7 mm up from 4 mm previously, however stable disease throughout, continue treatment with Alimta 500 mg p o  Per meter squared every 3 weeks and repeat CT scan in 3-4 months        HPI:  Amarilys Chance was admitted to the hospital with arrhythmia and was found to have right lower lobe infiltrate in August 2016   She wasreated with antibiotics however repeat chest x-ray showed persistent right lower lobe infiltrate  Subsequently the patient had a CT scan of the chest which showed a right perihilar mass, subcarinal lymphadenopathy, lytic lesion of the right costovertebral junction at T10 level   PET scan October 2016 showed a right perihilar mass measuring 3 5 cm with SUV of 8 9, subcarinal lymph nodes measuring 3 4 x 2 2 cm with SUV of 9 2   Nodule in the left upper lobe lung measured 2 x 1  1 cm   There was a lytic lesion involving the right 10th costovertebral junction with SUV of 14  4      Biopsy showed non-small cell carcinoma with features suggesting of adenocarcinoma positive for CK 7, CK 19, CA-19-9, ANEUDY-3, partially positive for P40, p63, negative for TTF-1   She had a history of uterine cancer in 2000 status post hysterectomy and did not require radiation or chemotherapy   She is status post bilateral oophorectomy, right knee replacement,   tonsillectomy   She used to smoke for 35 years 1 pack per day however quit smoking 21 years ago   She used hormonal replacement therapy for 3 years  Bernard Zheng has a family history significant for skin cancer in her father and coronary artery disease in mother  Bernard Zheng has 2 healthy children      She developed pneumonitis with Pembrolizumab requiring discontinuation of therapy for 6 months    She had progression of disease in the left upper lobe as well as the scapula even though she received radiation therapy to the scapula area  Nivolumab 240 mg flat dose every 2 weeks along with prednisone 10 mg p o  Daily initiated April 2018- October 2018       Liquid biopsy showed K-MADHURI mutation G12C, no evidence of MSI high    Interval History:        Previous Treatment:    1   Pembrolizumab 200 mg IV flat dose every 3 weeks initiated in December 2016, Finished in May 2017 secondary to grade 3 pneumonitis   2   radiation therapy to the left scapula in October 2017  3   Nivolumab 240 mg flat dose every 2 weeks initiated in April 2018 secondary to progression of the disease in the lung and the left scapula, prednisone 10 mg p o  daily to prevent pneumonitis  Nivolumab was continued every 2 weeks because of previous history of pneumonitis on Pembrolizumab    4   Additional 10 radiation tx to left scapula finished in November 2018       Test Results:    Imaging: Ct Chest W Contrast    Result Date: 10/3/2019  Narrative: CT CHEST WITH IV CONTRAST INDICATION:   C34 90: Malignant neoplasm of unspecified part of unspecified bronchus or lung  COMPARISON:  CT scan 6/12/2019  TECHNIQUE: CT examination of the chest was performed  Axial, sagittal, and coronal 2D reformatted images were created from the source data and submitted for interpretation  Radiation dose length product (DLP) for this visit:  657 mGy-cm   This examination, like all CT scans performed in the [facility], was performed utilizing techniques to minimize radiation dose exposure, including the use of iterative reconstruction and automated exposure control  IV Contrast:  85 mL of iohexol (OMNIPAQUE) FINDINGS: LUNGS:  Stable primary lung neoplasm left upper lobe image 3/37 measuring 2 1 x 1 7 cm  Enlarging left upper lobe satellite nodule more laterally image 3/39 measuring 7 mm (previous 4 mm)  Stable 9 mm left lower lobe nodule image 2/44  Interval clearing of groundglass opacities in the left lower lobe and right middle lobe  Endotracheal mucous noted  PLEURA:  Unremarkable  HEART/GREAT VESSELS:  Unremarkable for patient's age  MEDIASTINUM AND ANABEL:  Unremarkable  CHEST WALL AND LOWER NECK:   Stable small thyroid nodules  VISUALIZED STRUCTURES IN THE UPPER ABDOMEN:  Stable pancreatic body cyst measuring 2 3 cm  Stable steatosis throughout the liver  Status post cholecystectomy  OSSEOUS STRUCTURES:  Stable left scapular lytic lesion and stable right 10th rib ununited fracture  Impression: Stable left upper lobe dominant lesion and left lower lobe nodule  The satellite lesion in the left upper lobe has increased in size from 4 mm to 7 mm  Previously seen areas of groundglass opacity in the right middle lobe and left upper lobe have resolved  Stable left scapular lytic lesion and ununited right 10th rib fracture  The study was marked in EPIC for significant notification   Workstation performed: [id number]       Labs:   Lab Results   Component Value Date    WBC 4 90 10/01/2019    HGB 11 3 (L) 10/01/2019 HCT 35 4 10/01/2019    MCV 93 10/01/2019     10/01/2019     Lab Results   Component Value Date     11/23/2015    K 3 7 10/01/2019     10/01/2019    CO2 28 10/01/2019    ANIONGAP 9 11/23/2015    BUN 26 (H) 10/01/2019    CREATININE 1 14 10/01/2019    GLUCOSE 149 (H) 11/23/2015    GLUF 172 (H) 10/01/2019    CALCIUM 8 8 10/01/2019    AST 34 10/01/2019    ALT 45 10/01/2019    ALKPHOS 68 10/01/2019    PROT 6 8 11/23/2015    BILITOT 0 65 11/23/2015    EGFR 48 10/01/2019       No results found for: IRON, TIBC, FERRITIN    No results found for: [id number]      ROS:   Review of Systems   Constitutional: Negative for activity change, appetite change, diaphoresis, fatigue, fever and unexpected weight change  HENT: Negative for facial swelling, hearing loss, rhinorrhea, sinus pressure, sinus pain, sneezing, sore throat and tinnitus  Eyes: Negative for photophobia, pain, discharge, redness, itching and visual disturbance  Respiratory: Negative for apnea and chest tightness  Cardiovascular: Negative for chest pain, palpitations and leg swelling  Gastrointestinal: Negative for abdominal distention, abdominal pain, blood in stool, constipation, diarrhea, nausea, rectal pain and vomiting  Endocrine: Negative for cold intolerance, heat intolerance, polydipsia and polyphagia  Genitourinary: Negative for difficulty urinating, dyspareunia, frequency, hematuria, pelvic pain and urgency  Musculoskeletal: Negative for arthralgias, back pain, gait problem, joint swelling and myalgias  Skin: Negative for color change, pallor and rash  Allergic/Immunologic: Negative for environmental allergies and food allergies  Neurological: Negative for dizziness, tremors, seizures, syncope, speech difficulty, numbness and headaches  Hematological: Negative for adenopathy  Does not bruise/bleed easily  Psychiatric/Behavioral: Negative for agitation, confusion, dysphoric mood, hallucinations and suicidal ideas  Current Medications: Reviewed  Allergies: Reviewed  PMH/FH/SH:  Reviewed      Physical Exam:    Body surface area is 2 04 meters squared  Wt Readings from Last 3 Encounters:   10/07/19 99 8 kg (220 lb)   10/04/19 100 kg (220 lb 8 oz)   10/02/19 100 kg (221 lb 6 4 oz)        Temp Readings from Last 3 Encounters:   10/07/19 (!) 97 3 °F (36 3 °C) (Tympanic)   10/04/19 98 2 °F (36 8 °C) (Temporal)   10/02/19 97 7 °F (36 5 °C)        BP Readings from Last 3 Encounters:   10/07/19 140/70   10/04/19 132/68   10/02/19 120/74         Pulse Readings from Last 3 Encounters:   10/07/19 72   10/04/19 75   10/02/19 69        Physical Exam   Constitutional: She is oriented to person, place, and time  She appears well-developed and well-nourished  No distress  HENT:   Head: Normocephalic and atraumatic  Eyes: Conjunctivae are normal    Neck: Normal range of motion  Neck supple  No tracheal deviation present  Cardiovascular: Normal rate and regular rhythm  Exam reveals no gallop and no friction rub  No murmur heard  Pulmonary/Chest: Effort normal and breath sounds normal  No respiratory distress  She has no wheezes  She has no rales  She exhibits no tenderness  Abdominal: Soft  She exhibits no distension  There is no tenderness  Musculoskeletal: She exhibits no edema  Lymphadenopathy:     She has no cervical adenopathy  Neurological: She is alert and oriented to person, place, and time  Skin: Skin is warm and dry  She is not diaphoretic  No erythema  No pallor  Psychiatric: She has a normal mood and affect  Her behavior is normal  Judgment and thought content normal    Vitals reviewed  ECO  Goals and Barriers:  Current Goal: Minimize effects of disease  Barriers: None  Patient's Capacity to Self Care:  Patient is able to self care      Code Status: [unfilled]

## 2019-10-08 ENCOUNTER — OFFICE VISIT (OUTPATIENT)
Dept: PULMONOLOGY | Facility: CLINIC | Age: 69
End: 2019-10-08
Payer: MEDICARE

## 2019-10-08 VITALS
OXYGEN SATURATION: 99 % | HEART RATE: 59 BPM | BODY MASS INDEX: 36.49 KG/M2 | SYSTOLIC BLOOD PRESSURE: 164 MMHG | HEIGHT: 65 IN | WEIGHT: 219 LBS | DIASTOLIC BLOOD PRESSURE: 80 MMHG | TEMPERATURE: 97.8 F

## 2019-10-08 DIAGNOSIS — I48.91 ATRIAL FIBRILLATION, UNSPECIFIED TYPE (HCC): Primary | ICD-10-CM

## 2019-10-08 DIAGNOSIS — C41.9 MALIGNANT NEOPLASM OF BONE WITH METASTASES (HCC): ICD-10-CM

## 2019-10-08 DIAGNOSIS — G47.19 EXCESSIVE DAYTIME SLEEPINESS: ICD-10-CM

## 2019-10-08 DIAGNOSIS — C34.92 ADENOCARCINOMA OF LEFT LUNG, STAGE 4 (HCC): ICD-10-CM

## 2019-10-08 PROCEDURE — 99214 OFFICE O/P EST MOD 30 MIN: CPT | Performed by: INTERNAL MEDICINE

## 2019-10-08 NOTE — ASSESSMENT & PLAN NOTE
Currently stable on Alimta , she has a follow-up CT scan in few months and she will continue to follow with Oncology

## 2019-10-08 NOTE — ASSESSMENT & PLAN NOTE
Excessive daytime sleepiness sometimes with chronic fatigue, this could represent obstructive sleep apnea  I spoke to patient about workup for that with sleep study but she prefers to wait until she has more symptoms and she will let me know to order sleep study  Will continue to monitor for now

## 2019-10-08 NOTE — PROGRESS NOTES
Progress note - Pulmonary Medicine   Rob Griffith 68 y o  female MRN: [id number]       Impression & Plan:     Adenocarcinoma of lung, stage 4 ([address] )  Currently stable on Alimta , she has a follow-up CT scan in few months and she will continue to follow with Oncology  Excessive daytime sleepiness  Excessive daytime sleepiness sometimes with chronic fatigue, this could represent obstructive sleep apnea  I spoke to patient about workup for that with sleep study but she prefers to wait until she has more symptoms and she will let me know to order sleep study  Will continue to monitor for now       ______________________________________________________________________    HPI:    Inocencia Sees presents today for follow-up of lung cancer and history of drug-induced pneumonitis with Keytruda  Patient presents today feeling fine, denies any dyspnea on exertion or shortness of breath, denies any wheezing, denies any cough or sputum production, no chest pain, no fever or chills or night sweats, no weight loss  She rarely needs her p r n  Albuterol  Her lung cancer has pain well controlled with pemetrexed every 3 weeks, she had a follow-up with oncology yesterday and her most recent CT scan showed stability  She reports chronic fatigue and sometimes excessive daytime sleepiness, she has snoring sometimes, she sleeps 7 hours every night and then she takes 2 hours of napping every day  No nausea or vomiting or abdominal pain  No other complaints      Current Medications:    Current Outpatient Medications:     acetaminophen (TYLENOL) 325 mg tablet, Take 650 mg by mouth every 6 (six) hours as needed for mild pain, Disp: , Rfl:     apixaban (ELIQUIS) 5 mg, TAKE 1 TABLET TWICE A DAY, Disp: 180 tablet, Rfl: 3    Calcium Carb-Cholecalciferol 600-800 MG-UNIT TABS, Take 1 tablet by mouth 2 (two) times a day, Disp: , Rfl:     Cholecalciferol (VITAMIN D) 2000 units CAPS, Take by mouth, Disp: , Rfl:     cyanocobalamin (VITAMIN B-12) 100 mcg tablet, Take by mouth daily, Disp: , Rfl:     dexamethasone (DECADRON) 4 mg tablet, TAKE ONE TAB TWICE A DAY WITH FOOD, DAY BEFORE,DAY OF,DAY AFTER CHEMO, Disp: 30 tablet, Rfl: 1    Empagliflozin 10 MG TABS, Take 1 tablet (10 mg total) by mouth every morning, Disp: 30 tablet, Rfl: 5    Folic Acid 0 8 MG CAPS, , Disp: , Rfl:     metFORMIN (GLUCOPHAGE) 1000 MG tablet, Take 1 tablet (1,000 mg total) by mouth 2 (two) times a day with meals for 90 days, Disp: 180 tablet, Rfl: 3    metoprolol tartrate (LOPRESSOR) 25 mg tablet, TAKE 1 TABLET EVERY 12     HOURS, Disp: 180 tablet, Rfl: 3    omeprazole (PriLOSEC) 20 mg delayed release capsule, TAKE 1 CAPSULE DAILY, Disp: 90 capsule, Rfl: 3    predniSONE 10 mg tablet, Take 5 mg by mouth daily, Disp: , Rfl:     sotalol (BETAPACE) 80 mg tablet, TAKE 1 TABLET EVERY 12     HOURS, Disp: 180 tablet, Rfl: 3    TRADJENTA 5 MG TABS, TAKE 1 TABLET DAILY, Disp: 90 tablet, Rfl: 3    venlafaxine (EFFEXOR) 37 5 mg tablet, Take 1 tablet (37 5 mg total) by mouth daily, Disp: 180 tablet, Rfl: 1    Review of Systems:  Review of Systems   Constitutional: Positive for fatigue  HENT: Negative  Eyes: Negative  Respiratory: Negative  Cardiovascular: Negative  Gastrointestinal: Negative  Endocrine: Negative  Genitourinary: Negative  Musculoskeletal: Negative  Skin: Negative  Allergic/Immunologic: Negative  Neurological: Negative  Hematological: Negative  Psychiatric/Behavioral: Negative          Past medical history, surgical history, and family history were reviewed and updated as appropriate    Social history updates:  Social History     Tobacco Use   Smoking Status Former Smoker    Packs/day: 1 00    Years: 50 00    Pack years: 50 00    Types: Cigarettes    Start date:     Last attempt to quit: 2000    Years since quittin 7   Smokeless Tobacco Never Used   Tobacco Comment    Quit in  PhysicalExamination:  Vitals:   /80 (BP Location: Left arm, Patient Position: Sitting)   Pulse 59   Temp 97 8 °F (36 6 °C) (Tympanic)   Ht 5' 5" (1 651 m)   Wt 99 3 kg (219 lb)   LMP  (LMP Unknown)   SpO2 99%   BMI 36 44 kg/m²     General: alert, not in acute distress  HEENT: PERRL, no icteric sclera or cyanosis, no thrush  Neck:  Supple, no lymphadenopathy or thyromegaly, no JVD  Lungs:  Equal breath sounds and clear auscultations bilaterally, no wheezing or crackles  Heart: S1S2 regular, no murmures or gallops  Abdomen: soft, non-tender, bowel sounds  present  Extrimities: no edema, no clubbing or cyanosis  Neuro: Alert and oriented x 3, no focal neurodeficits   Skin: intact, no rashes    Diagnostic Data:  Labs: I personally reviewed the most recent laboratory data pertinent to today's visit    Lab Results   Component Value Date    WBC 4 90 10/01/2019    HGB 11 3 (L) 10/01/2019    HCT 35 4 10/01/2019    MCV 93 10/01/2019     10/01/2019     Lab Results   Component Value Date    SODIUM 140 10/01/2019    K 3 7 10/01/2019    CO2 28 10/01/2019     10/01/2019    BUN 26 (H) 10/01/2019    CREATININE 1 14 10/01/2019    CALCIUM 8 8 10/01/2019       PFT results: The most recent pulmonary function tests were reviewed    Normal spirometry with no obstruction, moderately decreased diffusion capacity    Imaging:  I personally reviewed the images on the [facility] system pertinent to today's visit  Chest CT scan reviewed on PACs and compared to prior CT scan in the room with the patient:  Stable left upper lobe opacity/primary cancer , resolved ground-glass opacities from before    Other studies:  Echocardiogram:  LVEF 32%, grade 2 diastolic dysfunction, estimated systolic RV pressure 35    David Brewer MD

## 2019-10-14 NOTE — PLAN OF CARE
Absence or prevention of progression during hospitalization Progressing      Absence of fever/infection during neutropenic period Progressing      Patient/family/caregiver demonstrates understanding of disease process, treatment plan, medications, and discharge instructions Progressing Start fesol 325mg 1-2 capsules daily  Consider GI work up  Repeat labs in 2 months call for results  [facility] iron studies dx anemia and RADHA

## 2019-10-22 ENCOUNTER — APPOINTMENT (OUTPATIENT)
Dept: LAB | Facility: CLINIC | Age: 69
End: 2019-10-22
Payer: MEDICARE

## 2019-10-22 ENCOUNTER — TELEPHONE (OUTPATIENT)
Dept: HEMATOLOGY ONCOLOGY | Facility: CLINIC | Age: 69
End: 2019-10-22

## 2019-10-22 DIAGNOSIS — C34.92 ADENOCARCINOMA OF LEFT LUNG, STAGE 4 (HCC): ICD-10-CM

## 2019-10-22 LAB
ALBUMIN SERPL BCP-MCNC: 3.1 G/DL (ref 3.5–5)
ALP SERPL-CCNC: 71 U/L (ref 46–116)
ALT SERPL W P-5'-P-CCNC: 47 U/L (ref 12–78)
ANION GAP SERPL CALCULATED.3IONS-SCNC: 8 MMOL/L (ref 4–13)
AST SERPL W P-5'-P-CCNC: 38 U/L (ref 5–45)
BASOPHILS # BLD AUTO: 0.02 THOUSANDS/ΜL (ref 0–0.1)
BASOPHILS NFR BLD AUTO: 0 % (ref 0–1)
BILIRUB SERPL-MCNC: 0.4 MG/DL (ref 0.2–1)
BUN SERPL-MCNC: 19 MG/DL (ref 5–25)
CALCIUM SERPL-MCNC: 9.1 MG/DL (ref 8.3–10.1)
CHLORIDE SERPL-SCNC: 106 MMOL/L (ref 100–108)
CO2 SERPL-SCNC: 30 MMOL/L (ref 21–32)
CREAT SERPL-MCNC: 1.24 MG/DL (ref 0.6–1.3)
EOSINOPHIL # BLD AUTO: 0.05 THOUSAND/ΜL (ref 0–0.61)
EOSINOPHIL NFR BLD AUTO: 1 % (ref 0–6)
ERYTHROCYTE [DISTWIDTH] IN BLOOD BY AUTOMATED COUNT: 15 % (ref 11.6–15.1)
GFR SERPL CREATININE-BSD FRML MDRD: 44 ML/MIN/1.73SQ M
GLUCOSE P FAST SERPL-MCNC: 146 MG/DL (ref 65–99)
HCT VFR BLD AUTO: 35.2 % (ref 34.8–46.1)
HGB BLD-MCNC: 11.2 G/DL (ref 11.5–15.4)
IMM GRANULOCYTES # BLD AUTO: 0.03 THOUSAND/UL (ref 0–0.2)
IMM GRANULOCYTES NFR BLD AUTO: 1 % (ref 0–2)
LYMPHOCYTES # BLD AUTO: 0.9 THOUSANDS/ΜL (ref 0.6–4.47)
LYMPHOCYTES NFR BLD AUTO: 20 % (ref 14–44)
MCH RBC QN AUTO: 30.4 PG (ref 26.8–34.3)
MCHC RBC AUTO-ENTMCNC: 31.8 G/DL (ref 31.4–37.4)
MCV RBC AUTO: 96 FL (ref 82–98)
MONOCYTES # BLD AUTO: 0.54 THOUSAND/ΜL (ref 0.17–1.22)
MONOCYTES NFR BLD AUTO: 12 % (ref 4–12)
NEUTROPHILS # BLD AUTO: 2.96 THOUSANDS/ΜL (ref 1.85–7.62)
NEUTS SEG NFR BLD AUTO: 66 % (ref 43–75)
NRBC BLD AUTO-RTO: 0 /100 WBCS
PLATELET # BLD AUTO: 208 THOUSANDS/UL (ref 149–390)
PMV BLD AUTO: 10.9 FL (ref 8.9–12.7)
POTASSIUM SERPL-SCNC: 3.7 MMOL/L (ref 3.5–5.3)
PROT SERPL-MCNC: 6.4 G/DL (ref 6.4–8.2)
RBC # BLD AUTO: 3.68 MILLION/UL (ref 3.81–5.12)
SODIUM SERPL-SCNC: 144 MMOL/L (ref 136–145)
WBC # BLD AUTO: 4.5 THOUSAND/UL (ref 4.31–10.16)

## 2019-10-22 PROCEDURE — 85025 COMPLETE CBC W/AUTO DIFF WBC: CPT

## 2019-10-22 PROCEDURE — 80053 COMPREHEN METABOLIC PANEL: CPT

## 2019-10-22 PROCEDURE — 36415 COLL VENOUS BLD VENIPUNCTURE: CPT

## 2019-10-22 NOTE — TELEPHONE ENCOUNTER
Patient called regarding results on her blood work, done this morning  Patient would like to know if she will be ok for chemo this coming Friday  Please call back and advise  Thank you

## 2019-10-25 ENCOUNTER — HOSPITAL ENCOUNTER (OUTPATIENT)
Dept: INFUSION CENTER | Facility: CLINIC | Age: 69
Discharge: HOME | End: 2019-10-25
Payer: MEDICARE

## 2019-10-25 VITALS
BODY MASS INDEX: 36.24 KG/M2 | HEART RATE: 66 BPM | TEMPERATURE: 97.6 F | WEIGHT: 217.5 LBS | SYSTOLIC BLOOD PRESSURE: 140 MMHG | DIASTOLIC BLOOD PRESSURE: 70 MMHG | RESPIRATION RATE: 16 BRPM | OXYGEN SATURATION: 98 % | HEIGHT: 65 IN

## 2019-10-25 DIAGNOSIS — C34.92 ADENOCARCINOMA OF LEFT LUNG, STAGE 4 (HCC): Primary | ICD-10-CM

## 2019-10-25 PROCEDURE — 96409 CHEMO IV PUSH SNGL DRUG: CPT

## 2019-10-25 PROCEDURE — 96367 TX/PROPH/DG ADDL SEQ IV INF: CPT

## 2019-10-25 RX ORDER — SODIUM CHLORIDE 9 MG/ML
20 INJECTION, SOLUTION INTRAVENOUS ONCE
Status: COMPLETED | OUTPATIENT
Start: 2019-10-25 | End: 2019-10-25

## 2019-10-25 RX ADMIN — SODIUM CHLORIDE 20 ML/HR: 0.9 INJECTION, SOLUTION INTRAVENOUS at 10:32

## 2019-10-25 RX ADMIN — ONDANSETRON 16 MG: 2 INJECTION INTRAMUSCULAR; INTRAVENOUS at 10:35

## 2019-10-25 RX ADMIN — SODIUM CHLORIDE 1000 MG: 9 INJECTION, SOLUTION INTRAVENOUS at 11:02

## 2019-10-25 NOTE — PLAN OF CARE
Problem: Potential for Falls  Goal: Patient will remain free of falls  Description  INTERVENTIONS:  - Assess patient frequently for physical needs  -  Identify cognitive and physical deficits and behaviors that affect risk of falls  -  [place] fall precautions as indicated by assessment   - Educate patient/family on patient safety including physical limitations  - Instruct patient to call for assistance with activity based on assessment  - Modify environment to reduce risk of injury  - Consider OT/PT consult to assist with strengthening/mobility  Outcome: Progressing     Problem: Knowledge Deficit  Goal: Patient/family/caregiver demonstrates understanding of disease process, treatment plan, medications, and discharge instructions  Description  Complete learning assessment and assess knowledge base    Interventions:  - Provide teaching at level of understanding  - Provide teaching via preferred learning methods  Outcome: Progressing

## 2019-10-25 NOTE — PROGRESS NOTES
Pt to clinic for alimta infusion, pt offers no complaints at this time, will continue to monitor, aware of next appointment, declines avs

## 2019-11-14 ENCOUNTER — APPOINTMENT (OUTPATIENT)
Dept: LAB | Facility: CLINIC | Age: 69
End: 2019-11-14
Payer: MEDICARE

## 2019-11-14 ENCOUNTER — TELEPHONE (OUTPATIENT)
Dept: HEMATOLOGY ONCOLOGY | Facility: CLINIC | Age: 69
End: 2019-11-14

## 2019-11-14 DIAGNOSIS — C34.92 ADENOCARCINOMA OF LEFT LUNG, STAGE 4 (HCC): ICD-10-CM

## 2019-11-14 LAB
ALBUMIN SERPL BCP-MCNC: 3.1 G/DL (ref 3.5–5)
ALP SERPL-CCNC: 67 U/L (ref 46–116)
ALT SERPL W P-5'-P-CCNC: 53 U/L (ref 12–78)
ANION GAP SERPL CALCULATED.3IONS-SCNC: 8 MMOL/L (ref 4–13)
AST SERPL W P-5'-P-CCNC: 47 U/L (ref 5–45)
BASOPHILS # BLD AUTO: 0.03 THOUSANDS/ΜL (ref 0–0.1)
BASOPHILS NFR BLD AUTO: 1 % (ref 0–1)
BILIRUB SERPL-MCNC: 0.4 MG/DL (ref 0.2–1)
BUN SERPL-MCNC: 17 MG/DL (ref 5–25)
CALCIUM SERPL-MCNC: 8.8 MG/DL (ref 8.3–10.1)
CHLORIDE SERPL-SCNC: 107 MMOL/L (ref 100–108)
CO2 SERPL-SCNC: 28 MMOL/L (ref 21–32)
CREAT SERPL-MCNC: 1.28 MG/DL (ref 0.6–1.3)
EOSINOPHIL # BLD AUTO: 0.07 THOUSAND/ΜL (ref 0–0.61)
EOSINOPHIL NFR BLD AUTO: 1 % (ref 0–6)
ERYTHROCYTE [DISTWIDTH] IN BLOOD BY AUTOMATED COUNT: 14.9 % (ref 11.6–15.1)
GFR SERPL CREATININE-BSD FRML MDRD: 42 ML/MIN/1.73SQ M
GLUCOSE P FAST SERPL-MCNC: 145 MG/DL (ref 65–99)
HCT VFR BLD AUTO: 36 % (ref 34.8–46.1)
HGB BLD-MCNC: 11.6 G/DL (ref 11.5–15.4)
IMM GRANULOCYTES # BLD AUTO: 0.02 THOUSAND/UL (ref 0–0.2)
IMM GRANULOCYTES NFR BLD AUTO: 0 % (ref 0–2)
LYMPHOCYTES # BLD AUTO: 0.9 THOUSANDS/ΜL (ref 0.6–4.47)
LYMPHOCYTES NFR BLD AUTO: 18 % (ref 14–44)
MCH RBC QN AUTO: 30.4 PG (ref 26.8–34.3)
MCHC RBC AUTO-ENTMCNC: 32.2 G/DL (ref 31.4–37.4)
MCV RBC AUTO: 95 FL (ref 82–98)
MONOCYTES # BLD AUTO: 0.62 THOUSAND/ΜL (ref 0.17–1.22)
MONOCYTES NFR BLD AUTO: 12 % (ref 4–12)
NEUTROPHILS # BLD AUTO: 3.36 THOUSANDS/ΜL (ref 1.85–7.62)
NEUTS SEG NFR BLD AUTO: 68 % (ref 43–75)
NRBC BLD AUTO-RTO: 0 /100 WBCS
PLATELET # BLD AUTO: 233 THOUSANDS/UL (ref 149–390)
PMV BLD AUTO: 10.5 FL (ref 8.9–12.7)
POTASSIUM SERPL-SCNC: 3.7 MMOL/L (ref 3.5–5.3)
PROT SERPL-MCNC: 6.2 G/DL (ref 6.4–8.2)
RBC # BLD AUTO: 3.81 MILLION/UL (ref 3.81–5.12)
SODIUM SERPL-SCNC: 143 MMOL/L (ref 136–145)
WBC # BLD AUTO: 5 THOUSAND/UL (ref 4.31–10.16)

## 2019-11-14 PROCEDURE — 85025 COMPLETE CBC W/AUTO DIFF WBC: CPT

## 2019-11-14 PROCEDURE — 80053 COMPREHEN METABOLIC PANEL: CPT

## 2019-11-14 PROCEDURE — 36415 COLL VENOUS BLD VENIPUNCTURE: CPT

## 2019-11-14 NOTE — TELEPHONE ENCOUNTER
Called the patient and informed her that her labs were good for chemotherapy  Patient verbalized understanding

## 2019-11-14 NOTE — TELEPHONE ENCOUNTER
Destiny Mason called stating that she wanted her labs to be reviewed to make sure that she can get chemo tomorrow  I informed patient that Michael Rahmaner will give her call when she gets a chance  Patient voiced understanding

## 2019-11-15 ENCOUNTER — HOSPITAL ENCOUNTER (OUTPATIENT)
Dept: INFUSION CENTER | Facility: CLINIC | Age: 69
Discharge: HOME | End: 2019-11-15
Payer: MEDICARE

## 2019-11-15 VITALS
RESPIRATION RATE: 16 BRPM | WEIGHT: 214 LBS | SYSTOLIC BLOOD PRESSURE: 138 MMHG | TEMPERATURE: 98.4 F | HEART RATE: 60 BPM | DIASTOLIC BLOOD PRESSURE: 68 MMHG | BODY MASS INDEX: 35.65 KG/M2 | HEIGHT: 65 IN

## 2019-11-15 DIAGNOSIS — R91.8 LUNG NODULE, MULTIPLE: ICD-10-CM

## 2019-11-15 DIAGNOSIS — C34.92 ADENOCARCINOMA OF LEFT LUNG, STAGE 4 (HCC): Primary | ICD-10-CM

## 2019-11-15 DIAGNOSIS — C41.9 MALIGNANT NEOPLASM OF BONE WITH METASTASES (HCC): ICD-10-CM

## 2019-11-15 PROCEDURE — 96372 THER/PROPH/DIAG INJ SC/IM: CPT

## 2019-11-15 PROCEDURE — 96367 TX/PROPH/DG ADDL SEQ IV INF: CPT

## 2019-11-15 PROCEDURE — 96409 CHEMO IV PUSH SNGL DRUG: CPT

## 2019-11-15 RX ORDER — CYANOCOBALAMIN 1000 UG/ML
1000 INJECTION INTRAMUSCULAR; SUBCUTANEOUS ONCE
Status: COMPLETED | OUTPATIENT
Start: 2019-11-15 | End: 2019-11-15

## 2019-11-15 RX ORDER — SODIUM CHLORIDE 9 MG/ML
20 INJECTION, SOLUTION INTRAVENOUS ONCE
Status: COMPLETED | OUTPATIENT
Start: 2019-11-15 | End: 2019-11-15

## 2019-11-15 RX ORDER — CYANOCOBALAMIN 1000 UG/ML
1000 INJECTION INTRAMUSCULAR; SUBCUTANEOUS ONCE
Status: CANCELLED | OUTPATIENT
Start: 2020-01-17

## 2019-11-15 RX ADMIN — SODIUM CHLORIDE 1000 MG: 9 INJECTION, SOLUTION INTRAVENOUS at 11:15

## 2019-11-15 RX ADMIN — SODIUM CHLORIDE 20 ML/HR: 0.9 INJECTION, SOLUTION INTRAVENOUS at 10:20

## 2019-11-15 RX ADMIN — ONDANSETRON 16 MG: 2 INJECTION INTRAMUSCULAR; INTRAVENOUS at 10:44

## 2019-11-15 RX ADMIN — CYANOCOBALAMIN 1000 MCG: 1000 INJECTION, SOLUTION INTRAMUSCULAR; SUBCUTANEOUS at 10:47

## 2019-11-15 NOTE — PROGRESS NOTES
Patient here for alimta and b12 and is doing very well  She offers no c/o  B12 admin right deltoid as ordered, bandaid applied CDI

## 2019-11-15 NOTE — PATIENT INSTRUCTIONS
November 2019 Sunday Monday Tuesday Wednesday Thursday Friday Saturday                            1     2                3     4     5     6     7     8     9                10     11     12     13     14    LAB WALK IN   6:20 AM   (5 min )   AN MOB LAB PSC CHAIR 1   [facility] [address] MOB 15    INF ONCOLOGY TX-TREATMENT PLAN  10:00 AM   (90 min )   AN INF CHAIR [place] [address] [facility] 16            C[address] 18, Day 1  + Add'l treatments    17     18    FOLLOW UP PG   8:45 AM   (30 min )   Mayra Light PA-C   [facility] [place] [address]     20     21     25     23                24     25     26     27     28     29     30                     Treatment Details       11/15/2019 - Cycle 18, Day 1 + Additional treatments      Chemotherapy: ONCBCN PROVIDER COMMUNICATION, ONCBCN PROVIDER COMMUNICATION5, PEMETREXED INFUSION      Supportive Care: cyanocobalamin        December 2019 Sunday Monday Tuesday Wednesday Thursday Friday Saturday   1     2     3     4     5     6    INF ONCOLOGY TX-TREATMENT PLAN  10:00 AM   (90 min )   AN INF CHAIR 4   [address] [facility] 7             C[address] 19, Day 1   8     9     10     11     12     13     14                15     16     17     18     19     20     21                22     23     24     25     26     27    INF ONCOLOGY TX-TREATMENT PLAN  10:00 AM   (90 min )   AN INF CHAIR [address] [facility] [address]             Cycle 20, Day 1   29     30     31                                         Treatment Details       12/7/2019 - Cycle 19, Day 1      Chemotherapy: ONCBCN PROVIDER COMMUNICATION, ONCBCN PROVIDER COMMUNICATION5, PEMETREXED INFUSION    12/28/2019 - Cycle 20, Day 1      Chemotherapy: ONCBCN PROVIDER COMMUNICATION, ONCBCN PROVIDER COMMUNICATION5, PEMETREXED INFUSION

## 2019-11-15 NOTE — PROGRESS NOTES
Patient tolerated treatment without incident and was discharged post, she offers no c/o and will RTO in 3 weeks for her next cycle

## 2019-11-15 NOTE — PROGRESS NOTES
Hematology/Oncology Outpatient Follow- up Note  Daina Griffith 68 y o  female MRN: @ Encounter: [id number]        Date:  11/18/2019      Assessment / Plan:    Adenocarcinoma of the lung primary in the left upper lobe of the lung with left scapula involvement stage IV diagnosed on 08/2016, PDL expression more than 50%, negative for EGFR, Ross 1 mutation, ALK gene rearrangement     Treated initially with Pembrolizumab complicated with pneumonitis and later on continued on nivolumab with prednisone 10 mg p o  Daily with excellent response and disease progression in October 2018 of the left scapula area with pain, no new lesions on the PET scan, she received additional course of radiation therapy to the left scapula and then treated with Alimta 500 milligram/meter squared, carboplatin AUC 5 after 3 cycles CT scan in January 2019 showed stable disease, subsequent CT scan showed stable disease     Currently on Alimta maintenance 500 milligram/meter squared every 3 weeks     Pembrolizumab was discontinued on 07/30/2019 because of pneumonitis     Prednisone 10 mg p o  Daily     Most recent CT scan on 10/01/2019 showed a small satellite lesion measuring 7 mm up from 4 mm previously, however stable disease throughout, continue treatment with Alimta 500 mg p o  Per meter squared every 3 weeks and repeat CT scan January 2020            HPI:  Tali Veloz was admitted to the hospital with arrhythmia and was found to have right lower lobe infiltrate in August 2016   She wasreated with antibiotics however repeat chest x-ray showed persistent right lower lobe infiltrate  Subsequently the patient had a CT scan of the chest which showed a right perihilar mass, subcarinal lymphadenopathy, lytic lesion of the right costovertebral junction at T10 level   PET scan October 2016 showed a right perihilar mass measuring 3 5 cm with SUV of 8 9, subcarinal lymph nodes measuring 3 4 x 2 2 cm with SUV of 9 2   Nodule in the left upper lobe lung measured 2 x 1 1 cm   There was a lytic lesion involving the right 10th costovertebral junction with SUV of 14  4      Biopsy showed non-small cell carcinoma with features suggesting of adenocarcinoma positive for CK 7, CK 19, CA-19-9, ANEUDY-3, partially positive for P40, p63, negative for TTF-1   She had a history of uterine cancer in 2000 status post hysterectomy and did not require radiation or chemotherapy   She is status post bilateral oophorectomy, right knee replacement,   tonsillectomy   She used to smoke for 35 years 1 pack per day however quit smoking 21 years ago   She used hormonal replacement therapy for 3 years  Edouard Perez has a family history significant for skin cancer in her father and coronary artery disease in mother  Edouard Perez has 2 healthy children      She developed pneumonitis with Pembrolizumab requiring discontinuation of therapy for 6 months    She had progression of disease in the left upper lobe as well as the scapula even though she received radiation therapy to the scapula area  Nivolumab 240 mg flat dose every 2 weeks along with prednisone 10 mg p o  Daily initiated April 2018- October 2018       Liquid biopsy showed K-MADHURI mutation G12C, no evidence of MSI high     Interval History:          Previous Treatment:    1   Pembrolizumab 200 mg IV flat dose every 3 weeks initiated in December 2016, Finished in May 2017 secondary to grade 3 pneumonitis   2   radiation therapy to the left scapula in October 2017  3   Nivolumab 240 mg flat dose every 2 weeks initiated in April 2018 secondary to progression of the disease in the lung and the left scapula, prednisone 10 mg p o  daily to prevent pneumonitis  Nivolumab was continued every 2 weeks because of previous history of pneumonitis on Pembrolizumab  4   Additional 10 radiation tx to left scapula finished in November 2018       Interval History:    She is feeling very well            Test Results:        Labs:   Lab Results   Component Value Date HGB 11 6 11/14/2019    HCT 36 0 11/14/2019    MCV 95 11/14/2019     11/14/2019    WBC 5 00 11/14/2019    NRBC 0 11/14/2019     Lab Results   Component Value Date     11/23/2015    K 3 7 11/14/2019     11/14/2019    CO2 28 11/14/2019    ANIONGAP 9 11/23/2015    BUN 17 11/14/2019    CREATININE 1 28 11/14/2019    GLUCOSE 149 (H) 11/23/2015    GLUF 145 (H) 11/14/2019    CALCIUM 8 8 11/14/2019    AST 47 (H) 11/14/2019    ALT 53 11/14/2019    ALKPHOS 67 11/14/2019    PROT 6 8 11/23/2015    BILITOT 0 65 11/23/2015    EGFR 42 11/14/2019       Imaging: No results found  ROS:  As mentioned in HPI & Interval History otherwise 14 point ROS negative  Allergies: Allergies   Allergen Reactions    Amoxicillin Rash and Hives    Cardizem [Diltiazem] Rash     Rash      Statins Myalgia     Severe muscle aching  Terrible pains     Current Medications: Reviewed  PMH/FH/SH:  Reviewed      Physical Exam:    There is no height or weight on file to calculate BSA  Ht Readings from Last 3 Encounters:   10/25/19 5' 5" (1 651 m)   10/08/19 5' 5" (1 651 m)   10/07/19 5' 4" (1 626 m)        Wt Readings from Last 3 Encounters:   10/25/19 98 7 kg (217 lb 8 oz)   10/08/19 99 3 kg (219 lb)   10/07/19 99 8 kg (220 lb)        Temp Readings from Last 3 Encounters:   10/25/19 97 6 °F (36 4 °C) (Temporal)   10/08/19 97 8 °F (36 6 °C) (Tympanic)   10/07/19 (!) 97 3 °F (36 3 °C) (Tympanic)        BP Readings from Last 3 Encounters:   10/25/19 140/70   10/08/19 164/80   10/07/19 140/70           Physical Exam   Constitutional: She is oriented to person, place, and time  She appears well-developed and well-nourished  No distress  HENT:   Head: Normocephalic and atraumatic  Eyes: Conjunctivae are normal    Neck: Normal range of motion  Neck supple  No tracheal deviation present  Cardiovascular: Normal rate and regular rhythm  Exam reveals no gallop and no friction rub  No murmur heard    Pulmonary/Chest: Effort normal and breath sounds normal  No respiratory distress  She has no wheezes  She has no rales  She exhibits no tenderness  Abdominal: Soft  She exhibits no distension  There is no tenderness  Lymphadenopathy:     She has no cervical adenopathy  Neurological: She is alert and oriented to person, place, and time  Skin: Skin is warm and dry  She is not diaphoretic  No erythema  No pallor  Psychiatric: She has a normal mood and affect  Her behavior is normal  Judgment and thought content normal    Vitals reviewed        ECO      Emergency Contacts:    Keri Bautista, ,

## 2019-11-18 ENCOUNTER — OFFICE VISIT (OUTPATIENT)
Dept: HEMATOLOGY ONCOLOGY | Facility: CLINIC | Age: 69
End: 2019-11-18
Payer: MEDICARE

## 2019-11-18 VITALS
SYSTOLIC BLOOD PRESSURE: 136 MMHG | HEART RATE: 60 BPM | RESPIRATION RATE: 14 BRPM | DIASTOLIC BLOOD PRESSURE: 80 MMHG | WEIGHT: 216 LBS | HEIGHT: 65 IN | BODY MASS INDEX: 35.99 KG/M2 | TEMPERATURE: 97.4 F

## 2019-11-18 DIAGNOSIS — C34.92 ADENOCARCINOMA OF LEFT LUNG, STAGE 4 (HCC): ICD-10-CM

## 2019-11-18 PROCEDURE — 99213 OFFICE O/P EST LOW 20 MIN: CPT | Performed by: PHYSICIAN ASSISTANT

## 2019-12-04 ENCOUNTER — APPOINTMENT (OUTPATIENT)
Dept: LAB | Facility: CLINIC | Age: 69
End: 2019-12-04
Payer: MEDICARE

## 2019-12-04 DIAGNOSIS — C34.92 ADENOCARCINOMA OF LEFT LUNG, STAGE 4 (HCC): ICD-10-CM

## 2019-12-04 DIAGNOSIS — C34.92 ADENOCARCINOMA OF LEFT LUNG, STAGE 4 (HCC): Primary | ICD-10-CM

## 2019-12-04 LAB
ALBUMIN SERPL BCP-MCNC: 3.1 G/DL (ref 3.5–5)
ALP SERPL-CCNC: 74 U/L (ref 46–116)
ALT SERPL W P-5'-P-CCNC: 32 U/L (ref 12–78)
ANION GAP SERPL CALCULATED.3IONS-SCNC: 9 MMOL/L (ref 4–13)
AST SERPL W P-5'-P-CCNC: 30 U/L (ref 5–45)
BASOPHILS # BLD AUTO: 0.02 THOUSANDS/ΜL (ref 0–0.1)
BASOPHILS NFR BLD AUTO: 0 % (ref 0–1)
BILIRUB SERPL-MCNC: 0.51 MG/DL (ref 0.2–1)
BUN SERPL-MCNC: 22 MG/DL (ref 5–25)
CALCIUM SERPL-MCNC: 9 MG/DL (ref 8.3–10.1)
CHLORIDE SERPL-SCNC: 106 MMOL/L (ref 100–108)
CO2 SERPL-SCNC: 29 MMOL/L (ref 21–32)
CREAT SERPL-MCNC: 1.24 MG/DL (ref 0.6–1.3)
EOSINOPHIL # BLD AUTO: 0.05 THOUSAND/ΜL (ref 0–0.61)
EOSINOPHIL NFR BLD AUTO: 1 % (ref 0–6)
ERYTHROCYTE [DISTWIDTH] IN BLOOD BY AUTOMATED COUNT: 15.2 % (ref 11.6–15.1)
GFR SERPL CREATININE-BSD FRML MDRD: 44 ML/MIN/1.73SQ M
GLUCOSE P FAST SERPL-MCNC: 121 MG/DL (ref 65–99)
HCT VFR BLD AUTO: 36.3 % (ref 34.8–46.1)
HGB BLD-MCNC: 11.7 G/DL (ref 11.5–15.4)
IMM GRANULOCYTES # BLD AUTO: 0.03 THOUSAND/UL (ref 0–0.2)
IMM GRANULOCYTES NFR BLD AUTO: 1 % (ref 0–2)
LYMPHOCYTES # BLD AUTO: 0.88 THOUSANDS/ΜL (ref 0.6–4.47)
LYMPHOCYTES NFR BLD AUTO: 17 % (ref 14–44)
MCH RBC QN AUTO: 30.4 PG (ref 26.8–34.3)
MCHC RBC AUTO-ENTMCNC: 32.2 G/DL (ref 31.4–37.4)
MCV RBC AUTO: 94 FL (ref 82–98)
MONOCYTES # BLD AUTO: 0.67 THOUSAND/ΜL (ref 0.17–1.22)
MONOCYTES NFR BLD AUTO: 13 % (ref 4–12)
NEUTROPHILS # BLD AUTO: 3.67 THOUSANDS/ΜL (ref 1.85–7.62)
NEUTS SEG NFR BLD AUTO: 68 % (ref 43–75)
NRBC BLD AUTO-RTO: 0 /100 WBCS
PLATELET # BLD AUTO: 277 THOUSANDS/UL (ref 149–390)
PMV BLD AUTO: 11.1 FL (ref 8.9–12.7)
POTASSIUM SERPL-SCNC: 3.5 MMOL/L (ref 3.5–5.3)
PROT SERPL-MCNC: 6.4 G/DL (ref 6.4–8.2)
RBC # BLD AUTO: 3.85 MILLION/UL (ref 3.81–5.12)
SODIUM SERPL-SCNC: 144 MMOL/L (ref 136–145)
WBC # BLD AUTO: 5.32 THOUSAND/UL (ref 4.31–10.16)

## 2019-12-04 PROCEDURE — 85025 COMPLETE CBC W/AUTO DIFF WBC: CPT

## 2019-12-04 PROCEDURE — 36415 COLL VENOUS BLD VENIPUNCTURE: CPT

## 2019-12-04 PROCEDURE — 80053 COMPREHEN METABOLIC PANEL: CPT

## 2019-12-04 RX ORDER — SODIUM CHLORIDE 9 MG/ML
20 INJECTION, SOLUTION INTRAVENOUS ONCE
Status: CANCELLED | OUTPATIENT
Start: 2020-01-18

## 2019-12-04 RX ORDER — SODIUM CHLORIDE 9 MG/ML
20 INJECTION, SOLUTION INTRAVENOUS ONCE
Status: CANCELLED | OUTPATIENT
Start: 2020-02-07

## 2019-12-04 RX ORDER — SODIUM CHLORIDE 9 MG/ML
20 INJECTION, SOLUTION INTRAVENOUS ONCE
Status: CANCELLED | OUTPATIENT
Start: 2019-12-28

## 2019-12-04 RX ORDER — SODIUM CHLORIDE 9 MG/ML
20 INJECTION, SOLUTION INTRAVENOUS ONCE
Status: CANCELLED | OUTPATIENT
Start: 2019-12-07

## 2019-12-05 ENCOUNTER — TELEPHONE (OUTPATIENT)
Dept: HEMATOLOGY ONCOLOGY | Facility: CLINIC | Age: 69
End: 2019-12-05

## 2019-12-05 NOTE — TELEPHONE ENCOUNTER
Called the patient and informed her that her labs look okay for treatment  She stated she has a slight cold and has been taking OTC cough medicine  Instructed her to increase her fluid intake and call her pcp if she gets any worse  Patient verbalized understanding

## 2019-12-06 ENCOUNTER — HOSPITAL ENCOUNTER (OUTPATIENT)
Dept: INFUSION CENTER | Facility: CLINIC | Age: 69
Discharge: HOME | End: 2019-12-06
Payer: MEDICARE

## 2019-12-06 VITALS
RESPIRATION RATE: 16 BRPM | BODY MASS INDEX: 36.19 KG/M2 | WEIGHT: 212 LBS | SYSTOLIC BLOOD PRESSURE: 128 MMHG | DIASTOLIC BLOOD PRESSURE: 72 MMHG | HEART RATE: 62 BPM | TEMPERATURE: 97.5 F | HEIGHT: 64 IN

## 2019-12-06 DIAGNOSIS — C34.92 ADENOCARCINOMA OF LEFT LUNG, STAGE 4 (HCC): Primary | ICD-10-CM

## 2019-12-06 PROCEDURE — 96409 CHEMO IV PUSH SNGL DRUG: CPT

## 2019-12-06 PROCEDURE — 96367 TX/PROPH/DG ADDL SEQ IV INF: CPT

## 2019-12-06 RX ORDER — SODIUM CHLORIDE 9 MG/ML
20 INJECTION, SOLUTION INTRAVENOUS ONCE
Status: COMPLETED | OUTPATIENT
Start: 2019-12-06 | End: 2019-12-06

## 2019-12-06 RX ADMIN — SODIUM CHLORIDE 1000 MG: 9 INJECTION, SOLUTION INTRAVENOUS at 12:01

## 2019-12-06 RX ADMIN — SODIUM CHLORIDE 20 ML/HR: 0.9 INJECTION, SOLUTION INTRAVENOUS at 11:05

## 2019-12-06 RX ADMIN — ONDANSETRON 16 MG: 2 INJECTION INTRAMUSCULAR; INTRAVENOUS at 11:23

## 2019-12-06 NOTE — PROGRESS NOTES
Pt is without complaints  Pt has labs within parameters to proceed today for ordered Alimta      Pt has call niño within reach

## 2019-12-19 DIAGNOSIS — C34.92 ADENOCARCINOMA OF LEFT LUNG, STAGE 4 (HCC): ICD-10-CM

## 2019-12-19 RX ORDER — DEXAMETHASONE 4 MG/1
TABLET ORAL
Qty: 30 TABLET | Refills: 1 | Status: SHIPPED | OUTPATIENT
Start: 2019-12-19 | End: 2020-02-24 | Stop reason: SDUPTHER

## 2019-12-19 NOTE — TELEPHONE ENCOUNTER
PT medication refill on dexamethasone (DECADRON) 4 mg tablet   The staff and providers of [facility] Medicine [facility] Rehabilitation would like to THANK YOU!:  Thank you for utilizing [facility] as your healthcare provider. Our Goal is to always provide you with the best of care and we continue to look for better ways to improve the service we provide you.     You may receive a survey in the mail with questions specific to your encounter with our clinic. Should you receive a survey, please take a few minutes to rate your experience with your visit. Our providers and staff value your opinions and insights and thank you in advance for your time and interest.     THERAPY ORDERS:   · If orders for [facility] services, including: Physical, Occupational, or Speech therapy; were placed today:   · Please allow three business days for our Rehab Scheduling Department to contact you in regards to proper location and available appointment times. If you have not received a phone call regarding scheduling within 3 business days please contact our office.     IMAGING/LABS:  · XR/Lab work: can be performed today, right here on the [facility] campus on a first come/first serve basis:  · Location: [address] in [facility]  Phone number: [phone]  Hours: M-F 700am-500 pm    · CT/MRI: require prior-authorization from your insurance company.   · The [facility] pre-service department will obtain the authorization for you. Once the authorization is approved, they will contact you to schedule the CT/MRI. Please note, in some cases this process can take up to two weeks due to the insurance company approval process.    RESULTS:   We commit to informing you of your test results as soon as possible.  You can expect the following:  · If the test is performed in our office (for example, EMG) - the doctor will explain the results before you leave the appointment.   · If we order a test that is not done in our office (for example, MRI) but is completed at an [facility] facility - you will receive a phone  call from the provider, nurse, or medical assistant to inform you of your test results.  You can expect this call within two business days after your test has been done.    · If you have a test done at an outside non-[facility] facility, sometimes the facility performing the test sends the results to us and sometimes they don’t (they always should but that does not always happen).  It is best for you personally to drop them off at our clinic.     MEDICATION REFILL: Please call 3 business days prior to medication refill date-so we can provide proper continuation of care of your medical needs.     We look forward to working with you at your next visit and THANK YOU, again, for choosing us to be your care team.       : Nilda RUTHERFORD  Medical Assistant: Mell NYE  Provider: Nina Fernández MD, Medical Director PM&R    Ayesha Henry, Manager clinic operations  [email or web address]     [phone]    Physical Medicine and Rehabilitation  [address] [place] [address], [address]  [place] [address]  Phone: [phone]  Fax: [phone]    [facility]  Did you know there is a better way to stay connected with your [facility] Providers regarding appointments, lab results, imaging results, as well as direct communication with your provider?     If you do not already have a [facility] Account, the [facility] patient portal gives you convenient online access to your health care team. You can view results, ask questions, even request prescription renewals and pay bills. To get started, type [email or web address] into your web browser's address window and click on [facility].    If you need assistance please contact our help desk:  Email: [email or web address]

## 2019-12-20 DIAGNOSIS — E11.9 CONTROLLED TYPE 2 DIABETES MELLITUS WITHOUT COMPLICATION, WITHOUT LONG-TERM CURRENT USE OF INSULIN (HCC): ICD-10-CM

## 2019-12-20 DIAGNOSIS — K21.9 GERD WITHOUT ESOPHAGITIS: ICD-10-CM

## 2019-12-20 RX ORDER — OMEPRAZOLE 20 MG/1
CAPSULE, DELAYED RELEASE ORAL
Qty: 90 CAPSULE | Refills: 3 | Status: SHIPPED | OUTPATIENT
Start: 2019-12-20 | End: 2020-02-24 | Stop reason: SDUPTHER

## 2019-12-20 RX ORDER — LINAGLIPTIN 5 MG/1
TABLET, FILM COATED ORAL
Qty: 90 TABLET | Refills: 3 | Status: SHIPPED | OUTPATIENT
Start: 2019-12-20 | End: 2020-02-24 | Stop reason: SDUPTHER

## 2019-12-24 ENCOUNTER — APPOINTMENT (OUTPATIENT)
Dept: LAB | Facility: CLINIC | Age: 69
End: 2019-12-24
Payer: MEDICARE

## 2019-12-24 ENCOUNTER — TRANSCRIBE ORDERS (OUTPATIENT)
Dept: LAB | Facility: CLINIC | Age: 69
End: 2019-12-24

## 2019-12-24 DIAGNOSIS — C34.92 ADENOCARCINOMA OF LEFT LUNG, STAGE 4 (HCC): ICD-10-CM

## 2019-12-24 LAB
ALBUMIN SERPL BCP-MCNC: 2.9 G/DL (ref 3.5–5)
ALP SERPL-CCNC: 73 U/L (ref 46–116)
ALT SERPL W P-5'-P-CCNC: 47 U/L (ref 12–78)
ANION GAP SERPL CALCULATED.3IONS-SCNC: 6 MMOL/L (ref 4–13)
AST SERPL W P-5'-P-CCNC: 42 U/L (ref 5–45)
BILIRUB SERPL-MCNC: 0.3 MG/DL (ref 0.2–1)
BUN SERPL-MCNC: 19 MG/DL (ref 5–25)
CALCIUM SERPL-MCNC: 8.8 MG/DL (ref 8.3–10.1)
CHLORIDE SERPL-SCNC: 107 MMOL/L (ref 100–108)
CO2 SERPL-SCNC: 30 MMOL/L (ref 21–32)
CREAT SERPL-MCNC: 1.23 MG/DL (ref 0.6–1.3)
GFR SERPL CREATININE-BSD FRML MDRD: 44 ML/MIN/1.73SQ M
GLUCOSE P FAST SERPL-MCNC: 141 MG/DL (ref 65–99)
POTASSIUM SERPL-SCNC: 3.9 MMOL/L (ref 3.5–5.3)
PROT SERPL-MCNC: 6 G/DL (ref 6.4–8.2)
SODIUM SERPL-SCNC: 143 MMOL/L (ref 136–145)

## 2019-12-24 PROCEDURE — 80053 COMPREHEN METABOLIC PANEL: CPT | Performed by: INTERNAL MEDICINE

## 2019-12-24 PROCEDURE — 36415 COLL VENOUS BLD VENIPUNCTURE: CPT | Performed by: INTERNAL MEDICINE

## 2019-12-26 ENCOUNTER — TELEPHONE (OUTPATIENT)
Dept: HEMATOLOGY ONCOLOGY | Facility: CLINIC | Age: 69
End: 2019-12-26

## 2019-12-26 NOTE — TELEPHONE ENCOUNTER
Patient called and asked to speak with RUBI Johnson  She stated has get blood work done yesterday and she wants to know if "the results are good enough for chemo scheduled for tomorrow 12/27/19 " Please call and advise

## 2019-12-26 NOTE — TELEPHONE ENCOUNTER
Called patient and informed her that her labs are okay for treatment  Patient verbalized understanding

## 2019-12-27 ENCOUNTER — HOSPITAL ENCOUNTER (OUTPATIENT)
Dept: INFUSION CENTER | Facility: CLINIC | Age: 69
Discharge: HOME | End: 2019-12-27
Payer: MEDICARE

## 2019-12-27 VITALS
SYSTOLIC BLOOD PRESSURE: 140 MMHG | HEIGHT: 63 IN | OXYGEN SATURATION: 97 % | BODY MASS INDEX: 36.5 KG/M2 | DIASTOLIC BLOOD PRESSURE: 78 MMHG | TEMPERATURE: 98.6 F | RESPIRATION RATE: 18 BRPM | WEIGHT: 206 LBS | HEART RATE: 71 BPM

## 2019-12-27 DIAGNOSIS — C34.92 ADENOCARCINOMA OF LEFT LUNG, STAGE 4 (HCC): Primary | ICD-10-CM

## 2019-12-27 PROCEDURE — 96367 TX/PROPH/DG ADDL SEQ IV INF: CPT

## 2019-12-27 PROCEDURE — 96409 CHEMO IV PUSH SNGL DRUG: CPT

## 2019-12-27 RX ORDER — SODIUM CHLORIDE 9 MG/ML
20 INJECTION, SOLUTION INTRAVENOUS ONCE
Status: COMPLETED | OUTPATIENT
Start: 2019-12-27 | End: 2019-12-27

## 2019-12-27 RX ORDER — CYANOCOBALAMIN 1000 UG/ML
1000 INJECTION INTRAMUSCULAR; SUBCUTANEOUS ONCE
Status: CANCELLED
Start: 2020-01-18

## 2019-12-27 RX ADMIN — ONDANSETRON 16 MG: 2 INJECTION INTRAMUSCULAR; INTRAVENOUS at 11:27

## 2019-12-27 RX ADMIN — SODIUM CHLORIDE 1000 MG: 9 INJECTION, SOLUTION INTRAVENOUS at 11:56

## 2019-12-27 RX ADMIN — SODIUM CHLORIDE 20 ML/HR: 0.9 INJECTION, SOLUTION INTRAVENOUS at 11:00

## 2019-12-27 NOTE — PROGRESS NOTES
Patient tolerated treatment without difficulty  Offers no complaints upon discharge   Given AVS at discharge

## 2019-12-27 NOTE — PROGRESS NOTES
Spoke with Elizabeth due to no upcoming order for Vit b12  Elizabeth states that it will be added to next treatment

## 2019-12-30 DIAGNOSIS — E11.9 CONTROLLED TYPE 2 DIABETES MELLITUS WITHOUT COMPLICATION, WITHOUT LONG-TERM CURRENT USE OF INSULIN (HCC): ICD-10-CM

## 2020-01-03 ENCOUNTER — APPOINTMENT (OUTPATIENT)
Dept: LAB | Facility: CLINIC | Age: 70
End: 2020-01-03
Payer: MEDICARE

## 2020-01-03 ENCOUNTER — HOSPITAL ENCOUNTER (OUTPATIENT)
Dept: CT IMAGING | Facility: HOSPITAL | Age: 70
Discharge: HOME | End: 2020-01-03
Payer: MEDICARE

## 2020-01-03 DIAGNOSIS — C34.92 ADENOCARCINOMA OF LEFT LUNG, STAGE 4 (HCC): ICD-10-CM

## 2020-01-03 DIAGNOSIS — E11.9 TYPE 2 DIABETES MELLITUS WITHOUT COMPLICATION, WITHOUT LONG-TERM CURRENT USE OF INSULIN (HCC): ICD-10-CM

## 2020-01-03 LAB
EST. AVERAGE GLUCOSE BLD GHB EST-MCNC: 174 MG/DL
HBA1C MFR BLD: 7.7 % (ref 4.2–6.3)

## 2020-01-03 PROCEDURE — 36415 COLL VENOUS BLD VENIPUNCTURE: CPT

## 2020-01-03 PROCEDURE — 71260 CT THORAX DX C+: CPT

## 2020-01-03 PROCEDURE — 83036 HEMOGLOBIN GLYCOSYLATED A1C: CPT

## 2020-01-03 RX ADMIN — IODIXANOL 85 ML: 320 INJECTION, SOLUTION INTRAVASCULAR at 07:42

## 2020-01-08 ENCOUNTER — OFFICE VISIT (OUTPATIENT)
Dept: FAMILY MEDICINE CLINIC | Facility: CLINIC | Age: 70
End: 2020-01-08
Payer: MEDICARE

## 2020-01-08 VITALS
DIASTOLIC BLOOD PRESSURE: 94 MMHG | TEMPERATURE: 97.5 F | BODY MASS INDEX: 35.41 KG/M2 | HEART RATE: 62 BPM | OXYGEN SATURATION: 98 % | HEIGHT: 64 IN | RESPIRATION RATE: 16 BRPM | WEIGHT: 207.4 LBS | SYSTOLIC BLOOD PRESSURE: 160 MMHG

## 2020-01-08 DIAGNOSIS — E11.9 TYPE 2 DIABETES MELLITUS WITHOUT COMPLICATION, WITHOUT LONG-TERM CURRENT USE OF INSULIN (HCC): Primary | ICD-10-CM

## 2020-01-08 DIAGNOSIS — I11.0 HYPERTENSIVE HEART DISEASE WITH DIASTOLIC CONGESTIVE HEART FAILURE, UNSPECIFIED HF CHRONICITY (HCC): ICD-10-CM

## 2020-01-08 DIAGNOSIS — E78.2 MIXED HYPERLIPIDEMIA: ICD-10-CM

## 2020-01-08 DIAGNOSIS — C34.92 ADENOCARCINOMA OF LEFT LUNG, STAGE 4 (HCC): ICD-10-CM

## 2020-01-08 DIAGNOSIS — I50.30 HYPERTENSIVE HEART DISEASE WITH DIASTOLIC CONGESTIVE HEART FAILURE, UNSPECIFIED HF CHRONICITY (HCC): ICD-10-CM

## 2020-01-08 DIAGNOSIS — I50.32 CHRONIC DIASTOLIC HEART FAILURE (HCC): ICD-10-CM

## 2020-01-08 DIAGNOSIS — C41.9 MALIGNANT NEOPLASM OF BONE WITH METASTASES (HCC): ICD-10-CM

## 2020-01-08 DIAGNOSIS — I48.91 ATRIAL FIBRILLATION, UNSPECIFIED TYPE (HCC): ICD-10-CM

## 2020-01-08 DIAGNOSIS — B37.3 CANDIDIASIS OF GENITALIA IN FEMALE: ICD-10-CM

## 2020-01-08 DIAGNOSIS — F32.5 MAJOR DEPRESSIVE DISORDER WITH SINGLE EPISODE, IN FULL REMISSION (HCC): ICD-10-CM

## 2020-01-08 DIAGNOSIS — I10 BENIGN ESSENTIAL HYPERTENSION: ICD-10-CM

## 2020-01-08 PROCEDURE — 99214 OFFICE O/P EST MOD 30 MIN: CPT | Performed by: FAMILY MEDICINE

## 2020-01-08 RX ORDER — NYSTATIN 100000 U/G
CREAM TOPICAL 2 TIMES DAILY
Qty: 30 G | Refills: 5 | Status: SHIPPED | OUTPATIENT
Start: 2020-01-08 | End: 2020-11-19 | Stop reason: ALTCHOICE

## 2020-01-08 RX ORDER — POTASSIUM CHLORIDE 1.5 G/1.77G
POWDER, FOR SOLUTION ORAL AS NEEDED
COMMUNITY
Start: 2019-12-26 | End: 2020-02-17 | Stop reason: ALTCHOICE

## 2020-01-08 RX ORDER — ONDANSETRON HYDROCHLORIDE 4 MG/1
TABLET, ORALLY DISINTEGRATING ORAL AS NEEDED
COMMUNITY
Start: 2019-12-02 | End: 2020-02-24

## 2020-01-08 RX ORDER — FUROSEMIDE 40 MG/1
TABLET ORAL AS NEEDED
COMMUNITY
Start: 2019-12-26 | End: 2020-02-17 | Stop reason: SDUPTHER

## 2020-01-08 NOTE — ASSESSMENT & PLAN NOTE
Wt Readings from Last 3 Encounters:   01/08/20 94 1 kg (207 lb 6 4 oz)   12/27/19 93 4 kg (206 lb)   12/06/19 96 2 kg (212 lb)     stable

## 2020-01-08 NOTE — ASSESSMENT & PLAN NOTE
Wt Readings from Last 3 Encounters:   01/08/20 94 1 kg (207 lb 6 4 oz)   12/27/19 93 4 kg (206 lb)   12/06/19 96 2 kg (212 lb)     On lasix as needed

## 2020-01-08 NOTE — PROGRESS NOTES
Assessment/Plan:    1  Type 2 diabetes mellitus without complication, without long-term current use of insulin (HCC)  Assessment & Plan:  Improved  Will start walking  Lab Results   Component Value Date    HGBA1C 7 7 (H) 01/03/2020       Orders:  -     Hemoglobin A1C; Future; Expected date: 05/01/2020  -     Microalbumin / creatinine urine ratio; Future; Expected date: 05/01/2020    2  Atrial fibrillation, unspecified type [facility])  Assessment & Plan:  Stable on meds- eliquis      3  Candidiasis of genitalia in female  -     nystatin (MYCOSTATIN) cream; Apply topically 2 (two) times a day    4  Benign essential hypertension  Assessment & Plan:  Elevated today off meds  Had caffeine      5  Chronic diastolic heart failure [facility])  Assessment & Plan:  Wt Readings from Last 3 Encounters:   01/08/20 94 1 kg (207 lb 6 4 oz)   12/27/19 93 4 kg (206 lb)   12/06/19 96 2 kg (212 lb)     On lasix as needed          6  Hypertensive heart disease with diastolic congestive heart failure, unspecified HF chronicity ([facility] [address] )  Assessment & Plan:  Wt Readings from Last 3 Encounters:   01/08/20 94 1 kg (207 lb 6 4 oz)   12/27/19 93 4 kg (206 lb)   12/06/19 96 2 kg (212 lb)     stable        Orders:  -     Comprehensive metabolic panel; Future; Expected date: 05/01/2020    7  Malignant neoplasm of bone with metastases [facility])  Assessment & Plan:  Diagnosed in 2016  Chemo every 3 week- infusion  Ct regularly      8  Adenocarcinoma of left lung, stage 4 (HCC)  Assessment & Plan:  Seeing heme/onc      9  Mixed hyperlipidemia  Assessment & Plan:  Intolerant of cholesterol meds    Orders:  -     Lipid Panel with Direct LDL reflex; Future; Expected date: 05/01/2020  -     TSH, 3rd generation with Free T4 reflex; Future; Expected date: 05/01/2020    10  Major depressive disorder with single episode, in full remission [facility])  Assessment & Plan:  stable      BMI Counseling: Body mass index is 36 07 kg/m²   The BMI is above normal  Nutrition recommendations include encouraging healthy choices of fruits and vegetables  Exercise recommendations include exercising 3-5 times per week  No pharmacotherapy was ordered  There are no Patient Instructions on file for this visit  No follow-ups on file  Subjective:      Patient ID: Sangeeta Purvis is a 68 y o  female  Chief Complaint   Patient presents with    Follow-up     Patient here for 3 month follow up on Type II Diabetes        Here for follow up  Has lost weight with watching diet  Is getting skin yeast issue regularly- using monistat  Didn't take her bp meds today- went off lisinopril    Hypertension   This is a chronic problem  The current episode started more than 1 year ago  The problem is unchanged  The problem is controlled  There are no associated agents to hypertension  Risk factors for coronary artery disease include diabetes mellitus and dyslipidemia  The current treatment provides moderate improvement  There are no compliance problems  Diabetes   She presents for her follow-up diabetic visit  She has type 2 diabetes mellitus  Her disease course has been stable  There are no hypoglycemic associated symptoms  Associated symptoms include fatigue  There are no hypoglycemic complications  Symptoms are stable  There are no diabetic complications  Risk factors for coronary artery disease include diabetes mellitus and dyslipidemia  Current diabetic treatment includes oral agent (triple therapy)  She is compliant with treatment all of the time  Her weight is stable  She is following a diabetic diet  She has not had a previous visit with a dietitian  She participates in exercise intermittently  There is no change in her home blood glucose trend  An ACE inhibitor/angiotensin II receptor blocker is contraindicated         The following portions of the patient's history were reviewed and updated as appropriate: allergies, current medications, past family history, past medical history, past social history, past surgical history and problem list     Review of Systems   Constitutional: Positive for fatigue  HENT: Negative  Eyes: Negative  Respiratory: Negative  Cardiovascular: Negative  Gastrointestinal: Negative  Endocrine: Negative  Genitourinary:        Itch skin   Musculoskeletal: Negative  Skin: Positive for rash  Allergic/Immunologic: Negative  Neurological: Negative  Hematological: Negative  Psychiatric/Behavioral: Negative            Current Outpatient Medications   Medication Sig Dispense Refill    apixaban (ELIQUIS) 5 mg TAKE 1 TABLET TWICE A  tablet 3    Calcium Carb-Cholecalciferol 600-800 MG-UNIT TABS Take 1 tablet by mouth 2 (two) times a day      Cholecalciferol (VITAMIN D) 2000 units CAPS Take by mouth      cyanocobalamin (VITAMIN B-12) 100 mcg tablet Take by mouth daily      dexamethasone (DECADRON) 4 mg tablet TAKE ONE TAB TWICE A DAY WITH FOOD, DAY BEFORE,DAY OF,DAY AFTER CHEMO 30 tablet 1    Empagliflozin 10 MG TABS Take 1 tablet (10 mg total) by mouth every morning 30 tablet 5    Folic Acid 0 8 MG CAPS       furosemide (LASIX) 40 mg tablet as needed      metFORMIN (GLUCOPHAGE) 1000 MG tablet TAKE 1 TABLET TWICE DAILY  WITH MEALS 180 tablet 3    metoprolol tartrate (LOPRESSOR) 25 mg tablet TAKE 1 TABLET EVERY 12     HOURS 180 tablet 3    omeprazole (PriLOSEC) 20 mg delayed release capsule TAKE 1 CAPSULE DAILY 90 capsule 3    ondansetron (ZOFRAN ODT) 4 mg disintegrating tablet as needed      potassium chloride (KLOR-CON) 20 mEq packet as needed      predniSONE 10 mg tablet Take 5 mg by mouth daily      sotalol (BETAPACE) 80 mg tablet TAKE 1 TABLET EVERY 12     HOURS 180 tablet 3    TRADJENTA 5 MG TABS TAKE 1 TABLET DAILY 90 tablet 3    venlafaxine (EFFEXOR) 37 5 mg tablet Take 1 tablet (37 5 mg total) by mouth daily 180 tablet 1    acetaminophen (TYLENOL) 325 mg tablet Take 650 mg by mouth every 6 (six) hours as needed for mild pain  nystatin (MYCOSTATIN) cream Apply topically 2 (two) times a day 30 g 5     No current facility-administered medications for this visit  Objective:    /94   Pulse 62   Temp 97 5 °F (36 4 °C)   Resp 16   Ht 5' 3 58" (1 615 m)   Wt 94 1 kg (207 lb 6 4 oz)   LMP  (LMP Unknown)   SpO2 98%   BMI 36 07 kg/m²        Physical Exam   Constitutional: She appears well-developed and well-nourished  HENT:   Head: Normocephalic and atraumatic  Eyes: Pupils are equal, round, and reactive to light  EOM are normal    Neck: Normal range of motion  Neck supple  Cardiovascular: Normal rate, regular rhythm, normal heart sounds and intact distal pulses  Pulmonary/Chest: Effort normal and breath sounds normal    Abdominal: Soft  Bowel sounds are normal    Neurological: She is alert  Skin: Skin is warm  Capillary refill takes less than 2 seconds  Nursing note and vitals reviewed               Leelee Harrison DO

## 2020-01-13 ENCOUNTER — OFFICE VISIT (OUTPATIENT)
Dept: HEMATOLOGY ONCOLOGY | Facility: CLINIC | Age: 70
End: 2020-01-13
Payer: MEDICARE

## 2020-01-13 DIAGNOSIS — C34.92 ADENOCARCINOMA OF LEFT LUNG, STAGE 4 (HCC): ICD-10-CM

## 2020-01-13 DIAGNOSIS — C41.9 MALIGNANT NEOPLASM OF BONE WITH METASTASES (HCC): Primary | ICD-10-CM

## 2020-01-13 PROCEDURE — 99214 OFFICE O/P EST MOD 30 MIN: CPT | Performed by: INTERNAL MEDICINE

## 2020-01-13 NOTE — PROGRESS NOTES
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 of the lung with left scapula involvement diagnosed on 08/2016 PDL expression more than 50%, negative for EGFR mutation, AL kg rearrangement, Ross 1 mutation    Treated initially with Pembrolizumab complicated with pneumonitis and later on nivolumab with prednisone 10 mg p o  Daily with excellent response    Disease progression in August 2018 in the left scapula with pain no new lesions by PET scan status post radiation therapy to the left scapula and treated with Alimta 500 milligram/meter squared, carboplatin AUC 5 after 3 cycles CT scan in January 2019 showed stable disease, currently on maintenance Alimta 500 milligram/meter squared every 3 weeks continue treatment    She is on prednisone 5 mg p o  Daily because of previous history of pneumonitis, CT scan showed no evidence of infiltration in the lung parenchyma, reduce prednisone to 5 mg every other day for 1 month and then discontinue    Cystic mass of the body of the pancreas had been there for many years, growing slowly, continue watchful observation    Follow-up in 6 weeks with office visit            HPI: Joanna Centeno was admitted to the hospital with arrhythmia and was found to have right lower lobe infiltrate in August 2016   She wasreated with antibiotics however repeat chest x-ray showed persistent right lower lobe infiltrate  Subsequently the patient had a CT scan of the chest which showed a right perihilar mass, subcarinal lymphadenopathy, lytic lesion of the right costovertebral junction at T10 level   PET scan October 2016 showed a right perihilar mass measuring 3 5 cm with SUV of 8 9, subcarinal lymph nodes measuring 3 4 x 2 2 cm with SUV of 9 2   Nodule in the left upper lobe lung measured 2 x 1 1 cm   There was a lytic lesion involving the right 10th costovertebral junction with SUV of 14  4      Biopsy showed non-small cell carcinoma with features suggesting of adenocarcinoma positive for CK 7, CK 19, CA-19-9, ANEUDY-3, partially positive for P40, p63, negative for TTF-1   She had a history of uterine cancer in 2000 status post hysterectomy and did not require radiation or chemotherapy   She is status post bilateral oophorectomy, right knee replacement,   tonsillectomy   She used to smoke for 35 years 1 pack per day however quit smoking 21 years ago   She used hormonal replacement therapy for 3 years  Neftali Verma has a family history significant for skin cancer in her father and coronary artery disease in mother  Neftali Verma has 2 healthy children      She developed pneumonitis with Pembrolizumab requiring discontinuation of therapy for 6 months    She had progression of disease in the left upper lobe as well as the scapula even though she received radiation therapy to the scapula area  Nivolumab 240 mg flat dose every 2 weeks along with prednisone 10 mg p o  Daily initiated April 2018- October 2018       Liquid biopsy showed K-MADHURI mutation G12C, no evidence of MSI high     Interval History:          Previous Treatment:    1   Pembrolizumab 200 mg IV flat dose every 3 weeks initiated in December 2016, Finished in May 2017 secondary to grade 3 pneumonitis   2   radiation therapy to the left scapula in October 2017  3   Nivolumab 240 mg flat dose every 2 weeks initiated in April 2018 secondary to progression of the disease in the lung and the left scapula, prednisone 10 mg p o  daily to prevent pneumonitis  Nivolumab was continued every 2 weeks because of previous history of pneumonitis on Pembrolizumab    4   Additional 10 radiation tx to left scapula finished in November 2018     Interval History:  She feels very well       Previous Treatment:         Test Results:    Imaging: Ct Chest W Contrast    Result Date: 1/7/2020  Narrative: CT CHEST WITH IV CONTRAST INDICATION: Stage IV adenocarcinoma of the left lung  Follow-up evaluation  COMPARISON:  Chest CT from October 1, 2019  Chest CT from July 12, 2019  TECHNIQUE: CT examination of the chest was performed  Axial, sagittal, and coronal 2D reformatted images were created from the source data and submitted for interpretation  Radiation dose length product (DLP) for this visit:  523 mGy-cm   This examination, like all CT scans performed in the [facility], was performed utilizing techniques to minimize radiation dose exposure, including the use of iterative reconstruction and automated exposure control  IV Contrast:  85 mL of iodixanol (VISIPAQUE) FINDINGS: LUNGS:  Primary lung neoplasm in the left upper lobe is noted on image 43 of series 3 measuring approximately 2 3 x 1 5 cm  Satellite 7 mm semisolid nodule noted in the periphery of the left upper lobe is unchanged (image 41, series 3)  Stable 3 mm nodule in the posterior aspect of the right middle lobe on image 69 of series 3  7 mm left lower lobe nodule adjacent to the left hemidiaphragm image 87 of series 3, remains unchanged  Redemonstrated scarring and focal atelectasis in the posterior superior right middle lobe with mild thickening along the posterior margin of the right minor fissure  Additional scarring in the medial and posterior dependent margins of the right lower lobe  PLEURA:  Stable posterior pleural thickening in the left hemithorax  HEART/GREAT VESSELS:  Unremarkable for patient's age  MEDIASTINUM AND ANABEL:  Unremarkable  CHEST WALL AND LOWER NECK:   Heterogeneous thyroid gland with a partially visualized 1 2 x 0 9 cm right thyroid nodule  Additional smaller nodules identified within both thyroid lobes  VISUALIZED STRUCTURES IN THE UPPER ABDOMEN:  Hepatic steatosis  Cholecystectomy  Approximate 2 5 x 2 2 cm cystic lesion within the pancreatic body    This is slightly larger than the prior 2 3 x 2 1 cm measurement on the chest CT study of June 12, 2019  No pancreatic calcifications  Pancreatic duct appears to be of normal size  Small hiatal hernia  OSSEOUS STRUCTURES:  No acute fracture or new destructive osseous lesion  Previously noted lucent lesion within the left scapula  No interval change in the appearance of the nonunited right posterior 10th rib fracture  Impression: Stable left upper lobe primary pulmonary neoplasm and adjacent 7 mm satellite nodule  Additional right upper lobe and left lower lobe pulmonary nodules are unchanged  Based on current Fleischner Society 2017 Guidelines on incidental pulmonary nodule, patients with a known malignancy are at increased risk of metastasis and should receive followup CT at intervals appropriate for the type of cancer and its risk of pulmonary metastases  Stable scarring in the right middle and lower lobes  Slightly larger 2 5 x 2 2 cm cyst within the pancreas  Preferred imaging modality: abdomen MRI and MRCP with and without IV contrast, or triple phase abdomen CT with IV contrast, or abdomen MRI and MRCP without IV contrast  The recommendations regarding pancreatic findings assumes that patient does not have family history of pancreatic cancer nor have any symptoms potentially attributable to pancreatic cystic lesions (hyperamylasemia, recent-onset diabetes, severe epigastric pain, weight loss, steatorrhea, or jaundice ) If these conditions are not true, then management should be deferred to judgement of specialists such as gastroenterologists or oncologic surgeons  Recommendations are based on recent consensus statements on management of pancreatic cystic lesions from [address] [place] [address] [employer] Association, [employer], the journal Pancreatology, and our own institutional consensus  Heterogeneous thyroid gland, with incompletely visualized thyroid nodules    Nonemergent follow-up sonography could be utilized for additional characterization  Workstation performed: [id number]       Labs:   Lab Results   Component Value Date    WBC 5 03 12/24/2019    HGB 11 0 (L) 12/24/2019    HCT 34 9 12/24/2019    MCV 97 12/24/2019     12/24/2019     Lab Results   Component Value Date     11/23/2015    K 3 9 12/24/2019     12/24/2019    CO2 30 12/24/2019    ANIONGAP 9 11/23/2015    BUN 19 12/24/2019    CREATININE 1 23 12/24/2019    GLUCOSE 149 (H) 11/23/2015    GLUF 141 (H) 12/24/2019    CALCIUM 8 8 12/24/2019    AST 42 12/24/2019    ALT 47 12/24/2019    ALKPHOS 73 12/24/2019    PROT 6 8 11/23/2015    BILITOT 0 65 11/23/2015    EGFR 44 12/24/2019       No results found for: IRON, TIBC, FERRITIN    No results found for: [id number]      ROS: Review of Systems   Constitutional: Negative for appetite change, chills, diaphoresis, fatigue and unexpected weight change  HENT:   Negative for mouth sores, nosebleeds, sore throat, trouble swallowing and voice change  Eyes: Negative for eye problems and icterus  Respiratory: Negative for chest tightness, cough, hemoptysis and wheezing  Cardiovascular: Negative for chest pain, leg swelling and palpitations  Gastrointestinal: Negative for abdominal distention, abdominal pain, blood in stool, constipation, diarrhea, nausea and vomiting  Endocrine: Negative for hot flashes  Genitourinary: Negative for bladder incontinence, difficulty urinating, dyspareunia, dysuria and frequency  Musculoskeletal: Negative for arthralgias, back pain, gait problem, neck pain and neck stiffness  Skin: Negative for itching and rash  Neurological: Negative for dizziness, gait problem, headaches, numbness, seizures and speech difficulty  Hematological: Negative for adenopathy  Does not bruise/bleed easily  Psychiatric/Behavioral: Negative for decreased concentration, depression, sleep disturbance and suicidal ideas  The patient is not nervous/anxious             Current Medications: Reviewed  Allergies: Reviewed  PMH/FH/SH:  Reviewed      Physical Exam:    There is no height or weight on file to calculate BSA  Wt Readings from Last 3 Encounters:   20 94 1 kg (207 lb 6 4 oz)   19 93 4 kg (206 lb)   19 96 2 kg (212 lb)        Temp Readings from Last 3 Encounters:   20 97 5 °F (36 4 °C)   19 98 6 °F (37 °C) (Oral)   19 97 5 °F (36 4 °C) (Temporal)        BP Readings from Last 3 Encounters:   20 160/94   19 140/78   19 128/72         Pulse Readings from Last 3 Encounters:   20 62   19 71   19 62        Physical Exam   Constitutional: She is oriented to person, place, and time  She appears well-developed and well-nourished  No distress  HENT:   Head: Normocephalic and atraumatic  Eyes: Conjunctivae are normal    Neck: Normal range of motion  Neck supple  No tracheal deviation present  Cardiovascular: Normal rate and regular rhythm  Exam reveals no gallop and no friction rub  No murmur heard  Pulmonary/Chest: Effort normal and breath sounds normal  No respiratory distress  She has no wheezes  She has no rales  She exhibits no tenderness  Abdominal: Soft  She exhibits no distension  There is no tenderness  Lymphadenopathy:     She has no cervical adenopathy  Neurological: She is alert and oriented to person, place, and time  Skin: Skin is warm and dry  She is not diaphoretic  No erythema  No pallor  Psychiatric: She has a normal mood and affect  Her behavior is normal  Judgment and thought content normal    Vitals reviewed  ECO    Goals and Barriers:  Current Goal: Minimize effects of disease  Barriers: None  Patient's Capacity to Self Care:  Patient is able to self care      Code Status: [unfilled]

## 2020-01-16 ENCOUNTER — APPOINTMENT (OUTPATIENT)
Dept: LAB | Facility: CLINIC | Age: 70
End: 2020-01-16
Payer: MEDICARE

## 2020-01-16 DIAGNOSIS — C34.92 ADENOCARCINOMA OF LEFT LUNG, STAGE 4 (HCC): ICD-10-CM

## 2020-01-16 LAB
ALBUMIN SERPL BCP-MCNC: 3 G/DL (ref 3.5–5)
ALP SERPL-CCNC: 76 U/L (ref 46–116)
ALT SERPL W P-5'-P-CCNC: 34 U/L (ref 12–78)
ANION GAP SERPL CALCULATED.3IONS-SCNC: 7 MMOL/L (ref 4–13)
AST SERPL W P-5'-P-CCNC: 35 U/L (ref 5–45)
BASOPHILS # BLD AUTO: 0.02 THOUSANDS/ΜL (ref 0–0.1)
BASOPHILS NFR BLD AUTO: 0 % (ref 0–1)
BILIRUB SERPL-MCNC: 0.38 MG/DL (ref 0.2–1)
BUN SERPL-MCNC: 18 MG/DL (ref 5–25)
CALCIUM SERPL-MCNC: 9 MG/DL (ref 8.3–10.1)
CHLORIDE SERPL-SCNC: 106 MMOL/L (ref 100–108)
CO2 SERPL-SCNC: 31 MMOL/L (ref 21–32)
CREAT SERPL-MCNC: 1.3 MG/DL (ref 0.6–1.3)
EOSINOPHIL # BLD AUTO: 0.05 THOUSAND/ΜL (ref 0–0.61)
EOSINOPHIL NFR BLD AUTO: 1 % (ref 0–6)
ERYTHROCYTE [DISTWIDTH] IN BLOOD BY AUTOMATED COUNT: 15.3 % (ref 11.6–15.1)
GFR SERPL CREATININE-BSD FRML MDRD: 41 ML/MIN/1.73SQ M
GLUCOSE P FAST SERPL-MCNC: 136 MG/DL (ref 65–99)
HCT VFR BLD AUTO: 35 % (ref 34.8–46.1)
HGB BLD-MCNC: 11.4 G/DL (ref 11.5–15.4)
IMM GRANULOCYTES # BLD AUTO: 0.01 THOUSAND/UL (ref 0–0.2)
IMM GRANULOCYTES NFR BLD AUTO: 0 % (ref 0–2)
LYMPHOCYTES # BLD AUTO: 1 THOUSANDS/ΜL (ref 0.6–4.47)
LYMPHOCYTES NFR BLD AUTO: 19 % (ref 14–44)
MCH RBC QN AUTO: 31.4 PG (ref 26.8–34.3)
MCHC RBC AUTO-ENTMCNC: 32.6 G/DL (ref 31.4–37.4)
MCV RBC AUTO: 96 FL (ref 82–98)
MONOCYTES # BLD AUTO: 0.59 THOUSAND/ΜL (ref 0.17–1.22)
MONOCYTES NFR BLD AUTO: 11 % (ref 4–12)
NEUTROPHILS # BLD AUTO: 3.54 THOUSANDS/ΜL (ref 1.85–7.62)
NEUTS SEG NFR BLD AUTO: 69 % (ref 43–75)
NRBC BLD AUTO-RTO: 0 /100 WBCS
PLATELET # BLD AUTO: 268 THOUSANDS/UL (ref 149–390)
PMV BLD AUTO: 10.6 FL (ref 8.9–12.7)
POTASSIUM SERPL-SCNC: 3.6 MMOL/L (ref 3.5–5.3)
PROT SERPL-MCNC: 6.4 G/DL (ref 6.4–8.2)
RBC # BLD AUTO: 3.63 MILLION/UL (ref 3.81–5.12)
SODIUM SERPL-SCNC: 144 MMOL/L (ref 136–145)
WBC # BLD AUTO: 5.21 THOUSAND/UL (ref 4.31–10.16)

## 2020-01-16 PROCEDURE — 80053 COMPREHEN METABOLIC PANEL: CPT

## 2020-01-16 PROCEDURE — 85025 COMPLETE CBC W/AUTO DIFF WBC: CPT

## 2020-01-16 PROCEDURE — 36415 COLL VENOUS BLD VENIPUNCTURE: CPT

## 2020-01-17 ENCOUNTER — TELEPHONE (OUTPATIENT)
Dept: HEMATOLOGY ONCOLOGY | Facility: CLINIC | Age: 70
End: 2020-01-17

## 2020-01-17 ENCOUNTER — HOSPITAL ENCOUNTER (OUTPATIENT)
Dept: INFUSION CENTER | Facility: CLINIC | Age: 70
Discharge: HOME | End: 2020-01-17
Payer: MEDICARE

## 2020-01-17 VITALS
HEIGHT: 65 IN | OXYGEN SATURATION: 98 % | SYSTOLIC BLOOD PRESSURE: 144 MMHG | BODY MASS INDEX: 34.87 KG/M2 | DIASTOLIC BLOOD PRESSURE: 76 MMHG | HEART RATE: 66 BPM | TEMPERATURE: 96.6 F | RESPIRATION RATE: 18 BRPM | WEIGHT: 209.31 LBS

## 2020-01-17 DIAGNOSIS — C34.92 ADENOCARCINOMA OF LEFT LUNG, STAGE 4 (HCC): Primary | ICD-10-CM

## 2020-01-17 PROBLEM — Z12.31 ENCOUNTER FOR SCREENING MAMMOGRAM FOR BREAST CANCER: Status: RESOLVED | Noted: 2019-10-02 | Resolved: 2020-01-17

## 2020-01-17 PROBLEM — R93.89 ABNORMAL CHEST X-RAY: Status: RESOLVED | Noted: 2017-05-24 | Resolved: 2020-01-17

## 2020-01-17 PROBLEM — R11.0 NAUSEA: Status: ACTIVE | Noted: 2020-01-17

## 2020-01-17 PROBLEM — Z00.00 MEDICARE ANNUAL WELLNESS VISIT, SUBSEQUENT: Status: RESOLVED | Noted: 2018-11-27 | Resolved: 2020-01-17

## 2020-01-17 PROBLEM — R53.83 OTHER FATIGUE: Status: ACTIVE | Noted: 2019-10-08

## 2020-01-17 PROBLEM — K86.2 CYST OF PANCREAS: Status: ACTIVE | Noted: 2020-01-17

## 2020-01-17 PROCEDURE — 96409 CHEMO IV PUSH SNGL DRUG: CPT

## 2020-01-17 PROCEDURE — 96367 TX/PROPH/DG ADDL SEQ IV INF: CPT

## 2020-01-17 PROCEDURE — 96372 THER/PROPH/DIAG INJ SC/IM: CPT

## 2020-01-17 RX ORDER — SODIUM CHLORIDE 9 MG/ML
20 INJECTION, SOLUTION INTRAVENOUS ONCE
Status: COMPLETED | OUTPATIENT
Start: 2020-01-17 | End: 2020-01-17

## 2020-01-17 RX ORDER — CYANOCOBALAMIN 1000 UG/ML
1000 INJECTION INTRAMUSCULAR; SUBCUTANEOUS ONCE
Status: COMPLETED | OUTPATIENT
Start: 2020-01-17 | End: 2020-01-17

## 2020-01-17 RX ADMIN — SODIUM CHLORIDE 20 ML/HR: 0.9 INJECTION, SOLUTION INTRAVENOUS at 12:01

## 2020-01-17 RX ADMIN — SODIUM CHLORIDE 1000 MG: 9 INJECTION, SOLUTION INTRAVENOUS at 12:42

## 2020-01-17 RX ADMIN — CYANOCOBALAMIN 1000 MCG: 1000 INJECTION, SOLUTION INTRAMUSCULAR; SUBCUTANEOUS at 12:14

## 2020-01-17 RX ADMIN — ONDANSETRON 16 MG: 2 INJECTION INTRAMUSCULAR; INTRAVENOUS at 12:09

## 2020-01-17 NOTE — PROGRESS NOTES
Patient is for chemo and B12  She offers no complaints at this time  Labs and creatinine clearance meet parameters   b 12 given in left arm and she tolerated it well

## 2020-01-17 NOTE — TELEPHONE ENCOUNTER
Patient has arrived to chemo   All labs per dr Marva Jordan are okay and she is okay to get treatment

## 2020-01-20 ENCOUNTER — OFFICE VISIT (OUTPATIENT)
Dept: PALLIATIVE MEDICINE | Facility: CLINIC | Age: 70
End: 2020-01-20
Payer: MEDICARE

## 2020-01-20 ENCOUNTER — SOCIAL WORK (OUTPATIENT)
Dept: PALLIATIVE MEDICINE | Facility: CLINIC | Age: 70
End: 2020-01-20
Payer: MEDICARE

## 2020-01-20 VITALS
SYSTOLIC BLOOD PRESSURE: 142 MMHG | OXYGEN SATURATION: 97 % | HEIGHT: 64 IN | DIASTOLIC BLOOD PRESSURE: 62 MMHG | BODY MASS INDEX: 35.77 KG/M2 | HEART RATE: 60 BPM | TEMPERATURE: 94.5 F | WEIGHT: 209.5 LBS | RESPIRATION RATE: 12 BRPM

## 2020-01-20 DIAGNOSIS — R11.0 NAUSEA: ICD-10-CM

## 2020-01-20 DIAGNOSIS — R53.83 OTHER FATIGUE: ICD-10-CM

## 2020-01-20 DIAGNOSIS — Z71.89 COUNSELING AND COORDINATION OF CARE: Primary | ICD-10-CM

## 2020-01-20 DIAGNOSIS — C34.92 ADENOCARCINOMA OF LEFT LUNG, STAGE 4 (HCC): Primary | ICD-10-CM

## 2020-01-20 DIAGNOSIS — Z63.6 CAREGIVER STRESS: ICD-10-CM

## 2020-01-20 DIAGNOSIS — F41.9 ANXIETY: ICD-10-CM

## 2020-01-20 PROCEDURE — 99213 OFFICE O/P EST LOW 20 MIN: CPT | Performed by: FAMILY MEDICINE

## 2020-01-20 PROCEDURE — NC001 PR NO CHARGE

## 2020-01-20 RX ORDER — VENLAFAXINE 37.5 MG/1
37.5 TABLET ORAL 2 TIMES DAILY
Qty: 180 TABLET | Refills: 1 | Status: SHIPPED | OUTPATIENT
Start: 2020-01-20 | End: 2020-04-14 | Stop reason: SDUPTHER

## 2020-01-20 SDOH — SOCIAL STABILITY - SOCIAL INSECURITY: DEPENDENT RELATIVE NEEDING CARE AT HOME: Z63.6

## 2020-01-20 NOTE — PROGRESS NOTES
Patient presents today for treatment with Alimta  Patient offers no complaints  VSS  Labs within parameters to treat  Peripheral IV inserted without incident 
Patient tolerated Alimta infusion without incident  Peripheral IV removed  Patient's next appointment verified  AVS offered and declined 
20-Jan-2020 14:32

## 2020-01-20 NOTE — PROGRESS NOTES
Palliative LSW met with patient and Dr Mac Porter this morning  Patient reported she has been doing very well  All of the medications she was being prescribed were helping her a lot  Patient takes care of her handicap brother who has a form of muscular dystrophy  She has all the available equipment to take care of his needs  She expressed it is not too much stress on her life, but she tells her brother she can go the day after he goes  Patient is still cooking 3 times a day, and getting around as much as possible  Patient reported she is very happy with her life and is blessed  She has 2 children and 2 grandchildren  She will be taking her daughter, SRINI, and 2 grandchildren to [facility] in the coming year  She is excited to go back and visit, and show her grandchildren how beautiful it is there  LSW provided emotional support and will follow when able

## 2020-01-26 ENCOUNTER — HOSPITAL ENCOUNTER (EMERGENCY)
Facility: HOSPITAL | Age: 70
Discharge: HOME | End: 2020-01-26
Attending: EMERGENCY MEDICINE | Admitting: EMERGENCY MEDICINE
Payer: MEDICARE

## 2020-01-26 ENCOUNTER — APPOINTMENT (EMERGENCY)
Dept: RADIOLOGY | Facility: HOSPITAL | Age: 70
End: 2020-01-26
Payer: MEDICARE

## 2020-01-26 VITALS
BODY MASS INDEX: 35 KG/M2 | TEMPERATURE: 99 F | SYSTOLIC BLOOD PRESSURE: 138 MMHG | WEIGHT: 205 LBS | DIASTOLIC BLOOD PRESSURE: 74 MMHG | OXYGEN SATURATION: 99 % | HEART RATE: 82 BPM | HEIGHT: 64 IN | RESPIRATION RATE: 14 BRPM

## 2020-01-26 DIAGNOSIS — M25.562 LEFT KNEE PAIN: Primary | ICD-10-CM

## 2020-01-26 PROCEDURE — 73564 X-RAY EXAM KNEE 4 OR MORE: CPT

## 2020-01-26 PROCEDURE — 99284 EMERGENCY DEPT VISIT MOD MDM: CPT | Performed by: PHYSICIAN ASSISTANT

## 2020-01-26 PROCEDURE — 99283 EMERGENCY DEPT VISIT LOW MDM: CPT

## 2020-01-26 RX ORDER — ACETAMINOPHEN 325 MG/1
325 TABLET ORAL EVERY 6 HOURS PRN
Qty: 12 TABLET | Refills: 0 | Status: SHIPPED | OUTPATIENT
Start: 2020-01-26 | End: 2020-01-29

## 2020-01-26 RX ORDER — ACETAMINOPHEN 325 MG/1
650 TABLET ORAL ONCE
Status: COMPLETED | OUTPATIENT
Start: 2020-01-26 | End: 2020-01-26

## 2020-01-26 RX ADMIN — ACETAMINOPHEN 650 MG: 325 TABLET, FILM COATED ORAL at 21:28

## 2020-01-27 NOTE — ED PROVIDER NOTES
History  Chief Complaint   Patient presents with    Knee Pain     Pt fell on black ice this AM, landed on left knee     Patient is a 77-year-old female history of atrial fibrillation, diabetes mellitus, hyperlipidemia, arthritis, total right knee arthroplasty that presents emergency department with dull and achy sudden onset nonradiating left knee pain for 14 hours  Denies associated symptomatology  Patient states that while she was walking in her driveway, she had slipped on ice and fell directly on her left knee  Patient denies [address], patient denies head strike  Patient is self ambulatory at this time assistance of cane, person's or assistive ambulatory device  Patient evident concerned with left knee pain and wanted to comes emergency department for evaluation at this time  Patient denies palliative factors with provocative factors of pressure to left knee  Patient denies not effective treatment  Patient denies fevers, chills, nausea, vomiting  Patient denies diarrhea constipation urinary symptoms  Patient denies numbness, tingling loss of power  Patient denies recent trauma  Patient affirms recent fall; aforementioned  Patient currently on Eliquis  Patient denies chest pain, shortness of breath, and abdominal pain        History provided by:  Patient   used: No    Knee Pain   Location:  Knee  Time since incident:  14 hours  Injury: yes    Mechanism of injury: fall    Fall:     Fall occurred:  Walking and tripped    Height of fall:  Approximate 3 feet; slipped on black ice on driveway    Impact surface:  Ice (driveway)    Point of impact:  Knees (left knee)  Knee location:  L knee  Pain details:     Quality:  Aching    Radiates to:  Does not radiate    Severity:  Mild    Onset quality:  Sudden    Timing:  Intermittent    Progression:  Unchanged  Chronicity:  New  Dislocation: no    Foreign body present:  No foreign bodies  Prior injury to area:  No  Relieved by: Nothing  Exacerbated by: pressure to left knee  Ineffective treatments:  None tried  Associated symptoms: no back pain, no decreased ROM, no fever, no itching, no muscle weakness, no neck pain, no numbness, no stiffness and no swelling        Prior to Admission Medications   Prescriptions Last Dose Informant Patient Reported? Taking?    Calcium Carb-Cholecalciferol 600-800 MG-UNIT TABS  Self Yes No   Sig: Take 1 tablet by mouth 2 (two) times a day   Cholecalciferol (VITAMIN D) 2000 units CAPS  Self Yes No   Sig: Take by mouth   Empagliflozin 10 MG TABS  Self No No   Sig: Take 1 tablet (10 mg total) by mouth every morning   Folic Acid 0 8 MG CAPS  Self Yes No   TRADJENTA 5 MG TABS   No No   Sig: TAKE 1 TABLET DAILY   acetaminophen (TYLENOL) 325 mg tablet  Self Yes No   Sig: Take 650 mg by mouth every 6 (six) hours as needed for mild pain   apixaban (ELIQUIS) 5 mg  Self No No   Sig: TAKE 1 TABLET TWICE A DAY   cyanocobalamin (VITAMIN B-12) 100 mcg tablet  Self Yes No   Sig: Take by mouth daily   dexamethasone (DECADRON) 4 mg tablet   No No   Sig: TAKE ONE TAB TWICE A DAY WITH FOOD, DAY BEFORE,DAY OF,DAY AFTER CHEMO   furosemide (LASIX) 40 mg tablet   Yes No   Sig: as needed   metFORMIN (GLUCOPHAGE) 1000 MG tablet   No No   Sig: TAKE 1 TABLET TWICE DAILY  WITH MEALS   metoprolol tartrate (LOPRESSOR) 25 mg tablet  Self No No   Sig: TAKE 1 TABLET EVERY 12     HOURS   nystatin (MYCOSTATIN) cream   No No   Sig: Apply topically 2 (two) times a day   omeprazole (PriLOSEC) 20 mg delayed release capsule   No No   Sig: TAKE 1 CAPSULE DAILY   ondansetron (ZOFRAN ODT) 4 mg disintegrating tablet   Yes No   Sig: as needed    potassium chloride (KLOR-CON) 20 mEq packet   Yes No   Sig: as needed   predniSONE 10 mg tablet  Self Yes No   Sig: Take 5 mg by mouth every other day    sotalol (BETAPACE) 80 mg tablet  Self No No   Sig: TAKE 1 TABLET EVERY 12     HOURS   venlafaxine (EFFEXOR) 37 5 mg tablet   No No   Sig: Take 1 tablet (37 5 mg total) by mouth 2 (two) times a day      Facility-Administered Medications: None       Past Medical History:   Diagnosis Date    Arthritis     Atrial fibrillation ([place] [address] )     Diabetes mellitus ([place] [address] )     Diabetes mellitus type 2, uncomplicated ([place] [address] )     Last assessed: 8/17/17    Essential hypertension     Frozen shoulder     L shoulder    GERD (gastroesophageal reflux disease)     Hx of cancer of uterus     Last assessed: 8/21/15    Hyperlipidemia     Hyponatremia 11/16/2016    Lung mass     diagnosed 9/2016    Malignant neoplasm without specification of site ([place] [address] )     Skin cancer, basal cell     right eye area    Stage 4 lung cancer ([place] [address] )        Past Surgical History:   Procedure Laterality Date    APPENDECTOMY      BRONCHOSCOPY N/A 11/17/2016    Procedure: BRONCHOSCOPY FLEXIBLE;  Surgeon: Darrius Holder MD;  [facility]: [facility];  Service:    [facility] [address]      HYSTERECTOMY  2000    Total abdominal    LARYNGOSCOPY      Flexible Fiberoptic, (Therapeutic), Resolved: 11/17/16    MOHS SURGERY      Micrographic Surgery Face    TN BRONCHOSCOPY NEEDLE BX TRACHEA MAIN STEM&/BRON N/A 11/17/2016    Procedure: EBUS; FROZEN SECTION ;  Surgeon: Darrius Holder MD;  [facility];  Service: Thoracic    TN [address] N/A 5/24/2017    Procedure: [address];  Surgeon: Rosenda Domingo MD;  [facility];   Service: Pulmonary    TONSILECTOMY AND ADNOIDECTOMY      TOTAL KNEE ARTHROPLASTY Right 09/23/2014       Family History   Problem Relation Age of Onset    Heart disease Father         cardiac disorder    Hypertension Father     Arthritis Father     Stroke Father         cerebrovascular accident    Diabetes Mother     Heart disease Mother     Stroke Mother         cerebrovascular accident   Kyle Moose Dementia Mother     Hypertension Mother     Thyroid disease Mother     Cancer Maternal Grandfather         of unknown origin    Cancer Family     Depression Family     Diabetes Family     Hyperlipidemia Family         essential    Heart disease Family     Hypertension Family     Stroke Family         syndrome    Lung cancer Paternal Uncle      I have reviewed and agree with the history as documented  Social History     Tobacco Use    Smoking status: Former Smoker     Packs/day: 1 00     Years: 50 00     Pack years: 50 00     Types: Cigarettes     Start date:      Last attempt to quit:      Years since quittin 0    Smokeless tobacco: Never Used    Tobacco comment: Quit in    Substance Use Topics    Alcohol use: No    Drug use: No        Review of Systems   Constitutional: Negative for activity change, appetite change, chills and fever  HENT: Negative for congestion, postnasal drip, rhinorrhea, sinus pressure, sinus pain, sore throat and tinnitus  Eyes: Negative for photophobia and visual disturbance  Respiratory: Negative for cough, chest tightness and shortness of breath  Cardiovascular: Negative for chest pain and palpitations  Gastrointestinal: Negative for abdominal pain, constipation, diarrhea, nausea and vomiting  Genitourinary: Negative for difficulty urinating, dysuria, flank pain, frequency and urgency  Musculoskeletal: Positive for arthralgias  Negative for back pain, gait problem, neck pain, neck stiffness and stiffness  Skin: Negative for itching, pallor and rash  Allergic/Immunologic: Negative for environmental allergies and food allergies  Neurological: Negative for dizziness, weakness, numbness and headaches  Psychiatric/Behavioral: Negative for confusion  All other systems reviewed and are negative  Physical Exam  Physical Exam   Constitutional: She is oriented to person, place, and time  She appears well-developed and well-nourished  She is active and cooperative  Non-toxic appearance  She does not have a sickly appearance  She does not appear ill     HENT:   Head: Normocephalic and atraumatic  Right Ear: Hearing and external ear normal  No drainage, swelling or tenderness  No mastoid tenderness  No decreased hearing is noted  Left Ear: Hearing and external ear normal  No drainage, swelling or tenderness  No mastoid tenderness  No decreased hearing is noted  Nose: Nose normal    Mouth/Throat: Uvula is midline, oropharynx is clear and moist and mucous membranes are normal    Eyes: Pupils are equal, round, and reactive to light  Conjunctivae, EOM and lids are normal  Right eye exhibits no discharge  Left eye exhibits no discharge  Neck: Trachea normal, normal range of motion, full passive range of motion without pain and phonation normal  Neck supple  No JVD present  No tracheal tenderness, no spinous process tenderness and no muscular tenderness present  No neck rigidity  No tracheal deviation and normal range of motion present  Cardiovascular: Normal rate, regular rhythm, normal heart sounds, intact distal pulses and normal pulses  Pulses:       Radial pulses are 2+ on the right side, and 2+ on the left side  Posterior tibial pulses are 2+ on the right side, and 2+ on the left side  Pulmonary/Chest: Effort normal and breath sounds normal  No stridor  She has no decreased breath sounds  She has no wheezes  She has no rhonchi  She has no rales  She exhibits no tenderness, no bony tenderness and no crepitus  Abdominal: Soft  Bowel sounds are normal  She exhibits no distension  There is no tenderness  There is no rigidity, no rebound, no guarding and no CVA tenderness  Musculoskeletal:        Left knee: She exhibits decreased range of motion  She exhibits no swelling, no ecchymosis, no deformity, no laceration, no erythema, normal alignment, no LCL laxity, normal patellar mobility, no bony tenderness, normal meniscus and no MCL laxity  Tenderness found  Medial joint line and lateral joint line tenderness noted  No MCL, no LCL and no patellar tendon tenderness noted  Passive ROM intact  Upper and lower extremity 5/5 bilaterally  Neurovascularly intact  No grinding or clicking of joints     Lymphadenopathy:        Head (right side): No submental, no submandibular, no tonsillar, no preauricular, no posterior auricular and no occipital adenopathy present  Head (left side): No submental, no submandibular, no tonsillar, no preauricular, no posterior auricular and no occipital adenopathy present  She has no cervical adenopathy  Right cervical: No superficial cervical, no deep cervical and no posterior cervical adenopathy present  Left cervical: No superficial cervical, no deep cervical and no posterior cervical adenopathy present  Neurological: She is alert and oriented to person, place, and time  She has normal strength and normal reflexes  No sensory deficit  GCS eye subscore is 4  GCS verbal subscore is 5  GCS motor subscore is 6  Reflex Scores:       Patellar reflexes are 2+ on the right side and 2+ on the left side  Skin: Skin is warm and intact  Capillary refill takes less than 2 seconds  She is not diaphoretic  Psychiatric: She has a normal mood and affect  Her speech is normal and behavior is normal  Judgment and thought content normal  Cognition and memory are normal    Nursing note and vitals reviewed        Vital Signs  ED Triage Vitals [01/26/20 2038]   Temperature Pulse Respirations Blood Pressure SpO2   99 °F (37 2 °C) 82 14 138/74 99 %      Temp Source Heart Rate Source Patient Position - Orthostatic VS BP Location FiO2 (%)   Oral -- -- -- --      Pain Score       8           Vitals:    01/26/20 2038   BP: 138/74   Pulse: 82         Visual Acuity      ED Medications  Medications   acetaminophen (TYLENOL) tablet 650 mg (650 mg Oral Given 1/26/20 2128)       Diagnostic Studies  Results Reviewed     None                 XR knee 4+ views LEFT   ED Interpretation by Young Simmonds, PA-C (01/26 2123)   No acute osseous abnormality on initial read in ED                  Procedures  Procedures         ED Course                               MDM  Number of Diagnoses or Management Options  Left knee pain: new and does not require workup     Amount and/or Complexity of Data Reviewed  Tests in the radiology section of CPT®: ordered and reviewed  Review and summarize past medical records: yes    Risk of Complications, Morbidity, and/or Mortality  Presenting problems: low  Diagnostic procedures: low  Management options: low    Patient Progress  Patient progress: stable     Patient is a 24-year-old female history of atrial fibrillation, diabetes mellitus, hyperlipidemia, arthritis, total right knee arthroplasty that presents emergency department with dull and achy sudden onset nonradiating left knee pain for 14 hours  Denies associated symptomatology  Patient hemodynamically stable and afebrile  No pain out of proportion to exam and patient afebrile; epidermis with no overlying erythema; doubt septic joint  Patient is self ambulatory at this time no assistance of cane, person's, assistive ambulatory device  Left knee x-ray with no acute osseous abnormality  Delivered Tylenol emergency department; patient verbalized decrease in left knee pain symptoms status post medication delivery  ED RN had applied Ace wrap to patient left knee; patient verbalizes decrease in left knee pain symptoms s/p ACE wrap application  Prescribed Tylenol and counseled patient medication administration and side effects  Patient verbally denied offered crutches  Follow up with physical therapy  Follow up with PCP  Follow up the emergency department if symptoms persist or exacerbate  Patient with verbal understanding of all clinical laboratory findings discharge instructions, follow up and verbalizes agreement with current treatment plan      Disposition  Final diagnoses:   Left knee pain     Time reflects when diagnosis was documented in both MDM as applicable and the Disposition within this note     Time User Action Codes Description Comment    1/26/2020  9:32 PM Dianne Jimenez Add [H28 [id number]] Left knee pain       ED Disposition     ED Disposition Condition Date/Time Comment    Discharge Stable Sun Jan 26, 2020  9:32 PM 29 Gabriella Casas discharge to home/self care  Follow-up Information     Follow up With Specialties Details Why Contact Info Additional Information    Physical Therapy at [facility] AND [facility] Physical Therapy Call  As needed [address] [facility], [facility] [address]  [phone] AN  MELANIA PT, Mary [address] [facility] [address], [place], [place], [facility]    Jonathan Caban,  Family Medicine Call in 1 week for further evaluation of symptoms [address] [id number]       [facility] Emergency Department Emergency Medicine Go to  As needed [address] [facility] [address] ED, [address], [place], [place], [address]          Discharge Medication List as of 1/26/2020  9:35 PM      START taking these medications    Details   !! acetaminophen (TYLENOL) 325 mg tablet Take 1 tablet (325 mg total) by mouth every 6 (six) hours as needed for mild pain for up to 3 days, Starting Sun 1/26/2020, Until Wed 1/29/2020, Normal       !! - Potential duplicate medications found  Please discuss with provider        CONTINUE these medications which have NOT CHANGED    Details   !! acetaminophen (TYLENOL) 325 mg tablet Take 650 mg by mouth every 6 (six) hours as needed for mild pain, Historical Med      apixaban (ELIQUIS) 5 mg TAKE 1 TABLET TWICE A DAY, Normal      Calcium Carb-Cholecalciferol 600-800 MG-UNIT TABS Take 1 tablet by mouth 2 (two) times a day, Historical Med      Cholecalciferol (VITAMIN D) 2000 units CAPS Take by mouth, Starting Tue 6/20/2017, Historical Med      cyanocobalamin (VITAMIN B-12) 100 mcg tablet Take by mouth daily, Historical Med dexamethasone (DECADRON) 4 mg tablet TAKE ONE TAB TWICE A DAY WITH FOOD, DAY BEFORE,DAY OF,DAY AFTER CHEMO, Normal      Empagliflozin 10 MG TABS Take 1 tablet (10 mg total) by mouth every morning, Starting Wed 14/9/7031, Normal      Folic Acid 0 8 MG CAPS Starting Mon 11/5/2018, Historical Med      furosemide (LASIX) 40 mg tablet as needed, Starting Thu 12/26/2019, Historical Med      metFORMIN (GLUCOPHAGE) 1000 MG tablet TAKE 1 TABLET TWICE DAILY  WITH MEALS, Normal      metoprolol tartrate (LOPRESSOR) 25 mg tablet TAKE 1 TABLET EVERY 12     HOURS, Normal      nystatin (MYCOSTATIN) cream Apply topically 2 (two) times a day, Starting Wed 1/8/2020, Normal      omeprazole (PriLOSEC) 20 mg delayed release capsule TAKE 1 CAPSULE DAILY, Normal      ondansetron (ZOFRAN ODT) 4 mg disintegrating tablet as needed , Starting Mon 12/2/2019, Historical Med      potassium chloride (KLOR-CON) 20 mEq packet as needed, Starting Thu 12/26/2019, Historical Med      predniSONE 10 mg tablet Take 5 mg by mouth every other day , Historical Med      sotalol (BETAPACE) 80 mg tablet TAKE 1 TABLET EVERY 12     HOURS, Normal      TRADJENTA 5 MG TABS TAKE 1 TABLET DAILY, Normal      venlafaxine (EFFEXOR) 37 5 mg tablet Take 1 tablet (37 5 mg total) by mouth 2 (two) times a day, Starting Mon 1/20/2020, Print       !! - Potential duplicate medications found  Please discuss with provider  No discharge procedures on file      ED Provider  Electronically Signed by           Brendan Ring PA-C  01/27/20 0139       Brendan Ring PA-C  01/27/20 9419

## 2020-01-27 NOTE — ED NOTES
Pt refusing ace wrap due to having her own knee brace  Tx and assessed by provider        Macarena Grimes RN  01/26/20 9240

## 2020-01-27 NOTE — DISCHARGE INSTRUCTIONS
Take tylenol as indicated  Follow up with PCP   Follow up with the emergency department if symptoms persist or exacerbate

## 2020-01-30 ENCOUNTER — TELEPHONE (OUTPATIENT)
Dept: HEMATOLOGY ONCOLOGY | Facility: CLINIC | Age: 70
End: 2020-01-30

## 2020-01-30 NOTE — TELEPHONE ENCOUNTER
Prednisone 5 mg is to be taken every other day for one month then discontinue   Patient verbalized understanding

## 2020-01-30 NOTE — TELEPHONE ENCOUNTER
Patient called to follow up on the instructions regarding her predniSONE medication  She stated that Dr Shukri Mueller advised at her 1/13 office visit to discontinue after a month in efforts to wean off the medication  She would like like to know if there are any further instructions  Best call back [phone]

## 2020-02-04 ENCOUNTER — OFFICE VISIT (OUTPATIENT)
Dept: CARDIOLOGY CLINIC | Facility: CLINIC | Age: 70
End: 2020-02-04
Payer: MEDICARE

## 2020-02-04 VITALS
SYSTOLIC BLOOD PRESSURE: 158 MMHG | BODY MASS INDEX: 35.24 KG/M2 | HEIGHT: 64 IN | WEIGHT: 206.4 LBS | HEART RATE: 68 BPM | OXYGEN SATURATION: 97 % | DIASTOLIC BLOOD PRESSURE: 62 MMHG

## 2020-02-04 DIAGNOSIS — I48.91 ATRIAL FIBRILLATION, UNSPECIFIED TYPE (HCC): Primary | ICD-10-CM

## 2020-02-04 DIAGNOSIS — I48.0 PAROXYSMAL ATRIAL FIBRILLATION (HCC): ICD-10-CM

## 2020-02-04 DIAGNOSIS — I11.0 HYPERTENSIVE HEART DISEASE WITH DIASTOLIC CONGESTIVE HEART FAILURE, UNSPECIFIED HF CHRONICITY (HCC): ICD-10-CM

## 2020-02-04 DIAGNOSIS — E78.2 MIXED HYPERLIPIDEMIA: ICD-10-CM

## 2020-02-04 DIAGNOSIS — I50.30 HYPERTENSIVE HEART DISEASE WITH DIASTOLIC CONGESTIVE HEART FAILURE, UNSPECIFIED HF CHRONICITY (HCC): ICD-10-CM

## 2020-02-04 DIAGNOSIS — I10 BENIGN ESSENTIAL HYPERTENSION: ICD-10-CM

## 2020-02-04 PROCEDURE — 99214 OFFICE O/P EST MOD 30 MIN: CPT | Performed by: INTERNAL MEDICINE

## 2020-02-04 PROCEDURE — 1036F TOBACCO NON-USER: CPT | Performed by: INTERNAL MEDICINE

## 2020-02-04 PROCEDURE — 93000 ELECTROCARDIOGRAM COMPLETE: CPT | Performed by: INTERNAL MEDICINE

## 2020-02-04 PROCEDURE — 2022F DILAT RTA XM EVC RTNOPTHY: CPT | Performed by: INTERNAL MEDICINE

## 2020-02-04 PROCEDURE — 1160F RVW MEDS BY RX/DR IN RCRD: CPT | Performed by: INTERNAL MEDICINE

## 2020-02-04 PROCEDURE — 3008F BODY MASS INDEX DOCD: CPT | Performed by: INTERNAL MEDICINE

## 2020-02-04 PROCEDURE — 4010F ACE/ARB THERAPY RXD/TAKEN: CPT | Performed by: INTERNAL MEDICINE

## 2020-02-04 PROCEDURE — 3078F DIAST BP <80 MM HG: CPT | Performed by: INTERNAL MEDICINE

## 2020-02-04 PROCEDURE — 4040F PNEUMOC VAC/ADMIN/RCVD: CPT | Performed by: INTERNAL MEDICINE

## 2020-02-04 PROCEDURE — 3077F SYST BP >= 140 MM HG: CPT | Performed by: INTERNAL MEDICINE

## 2020-02-04 RX ORDER — LISINOPRIL 10 MG/1
10 TABLET ORAL DAILY
Qty: 90 TABLET | Refills: 3 | Status: SHIPPED | OUTPATIENT
Start: 2020-02-04 | End: 2020-07-23

## 2020-02-04 NOTE — PROGRESS NOTES
Cardiology   Gustavo Resendez 68 y o  female MRN: [id number]        Impression:  1  Paroxysmal atrial fibrillation - in normal sinus rhythm  On antiarrhythmic medications  On anticoagulation  2  Hypertension - good control  3  Dyslipidemia - unable to tolerate statin or Zetia      Recommendations:  1  Start lisinopril 10mg daily  2  Continue remainder of medications  3  Check fasting lipid panel and complete metabolic profile  4  Follow up in 6 months  HPI: Gustavo Resendez is a 68y o  year old female with hypertension, diabetes, stage IV lung ca, paroxysmal atrial fibrillation, who returns for follow up  Unable to tolerate losartan due to cough   No chest pain or shortness of breath  Unable to take statins or Zetia  Major c/o is LLE edema from fall  Review of Systems   Constitutional: Negative  HENT: Negative  Eyes: Negative  Respiratory: Negative for chest tightness and shortness of breath  Cardiovascular: Positive for leg swelling  Negative for chest pain and palpitations  Gastrointestinal: Negative  Endocrine: Negative  Genitourinary: Negative  Musculoskeletal: Negative  Skin: Negative  Allergic/Immunologic: Negative  Neurological: Negative  Hematological: Negative  Psychiatric/Behavioral: Negative  All other systems reviewed and are negative          Past Medical History:   Diagnosis Date    Arthritis     Atrial fibrillation ([facility] )     Diabetes mellitus [facility] )     Diabetes mellitus type 2, uncomplicated ([facility] )     Last assessed: 8/17/17    Essential hypertension     Frozen shoulder     L shoulder    GERD (gastroesophageal reflux disease)     Hx of cancer of uterus     Last assessed: 8/21/15    Hyperlipidemia     Hyponatremia 11/16/2016    Lung mass     diagnosed 9/2016    [facility]     Skin cancer, basal cell     right eye area    Stage 4 lung cancer [facility])      Past Surgical History:   Procedure Laterality Date    APPENDECTOMY      BRONCHOSCOPY N/A 2016    Procedure: BRONCHOSCOPY FLEXIBLE;  Surgeon: Rylan Hernández MD;  Location[facility];  Service:    [address]    Total abdominal    LARYNGOSCOPY      Flexible Fiberoptic, (Therapeutic), Resolved: 16    MOHS SURGERY      Micrographic Surgery Face    SC BRONCHOSCOPY NEEDLE BX TRACHEA MAIN STEM&/BRON N/A 2016    Procedure: EBUS; FROZEN SECTION ;  Surgeon: Rylan Hernández MD;  Location[facility];  Service: Thoracic    [address] N/A 2017    Procedure: Abbi Deep;  Surgeon: Gerhardt Clipper, MD;  Location: [facility]; Service: Pulmonary    TONSILECTOMY AND ADNOIDECTOMY      TOTAL KNEE ARTHROPLASTY Right 2014     Social History     Substance and Sexual Activity   Alcohol Use No     Social History     Substance and Sexual Activity   Drug Use No     Social History     Tobacco Use   Smoking Status Former Smoker    Packs/day: 1 00    Years: 50 00    Pack years: 50 00    Types: Cigarettes    Start date:     Last attempt to quit:     Years since quittin 1   Smokeless Tobacco Never Used   Tobacco Comment    Quit in      Family History   Problem Relation Age of Onset    Heart disease Father         cardiac disorder    Hypertension Father     Arthritis Father     Stroke Father         cerebrovascular accident    Diabetes Mother     Heart disease Mother    Merle Crystal Stroke Mother         cerebrovascular accident   Merle Crystal Dementia Mother     Hypertension Mother     Thyroid disease Mother     Cancer Maternal Grandfather         of unknown origin    Cancer Family     Depression Family     Diabetes Family     Hyperlipidemia Family         essential    Heart disease Family     Hypertension Family     Stroke Family         syndrome    Lung cancer Paternal Uncle        Allergies:   Allergies   Allergen Reactions    Amoxicillin Rash and Hives    Cardizem [Diltiazem] Rash     Rash      Statins Myalgia     Severe muscle aching  Terrible pains       Medications:     Current Outpatient Medications:     acetaminophen (TYLENOL) 325 mg tablet, Take 650 mg by mouth every 6 (six) hours as needed for mild pain, Disp: , Rfl:     apixaban (ELIQUIS) 5 mg, TAKE 1 TABLET TWICE A DAY, Disp: 180 tablet, Rfl: 3    Calcium Carb-Cholecalciferol 600-800 MG-UNIT TABS, Take 1 tablet by mouth 2 (two) times a day, Disp: , Rfl:     Cholecalciferol (VITAMIN D) 2000 units CAPS, Take by mouth, Disp: , Rfl:     cyanocobalamin (VITAMIN B-12) 100 mcg tablet, Take by mouth daily, Disp: , Rfl:     dexamethasone (DECADRON) 4 mg tablet, TAKE ONE TAB TWICE A DAY WITH FOOD, DAY BEFORE,DAY OF,DAY AFTER CHEMO, Disp: 30 tablet, Rfl: 1    Empagliflozin 10 MG TABS, Take 1 tablet (10 mg total) by mouth every morning, Disp: 30 tablet, Rfl: 5    Folic Acid 0 8 MG CAPS, , Disp: , Rfl:     furosemide (LASIX) 40 mg tablet, as needed, Disp: , Rfl:     metFORMIN (GLUCOPHAGE) 1000 MG tablet, TAKE 1 TABLET TWICE DAILY  WITH MEALS, Disp: 180 tablet, Rfl: 3    metoprolol tartrate (LOPRESSOR) 25 mg tablet, TAKE 1 TABLET EVERY 12     HOURS, Disp: 180 tablet, Rfl: 3    nystatin (MYCOSTATIN) cream, Apply topically 2 (two) times a day, Disp: 30 g, Rfl: 5    omeprazole (PriLOSEC) 20 mg delayed release capsule, TAKE 1 CAPSULE DAILY, Disp: 90 capsule, Rfl: 3    ondansetron (ZOFRAN ODT) 4 mg disintegrating tablet, as needed , Disp: , Rfl:     potassium chloride (KLOR-CON) 20 mEq packet, as needed, Disp: , Rfl:     predniSONE 10 mg tablet, Take 5 mg by mouth every other day , Disp: , Rfl:     sotalol (BETAPACE) 80 mg tablet, TAKE 1 TABLET EVERY 12     HOURS, Disp: 180 tablet, Rfl: 3    TRADJENTA 5 MG TABS, TAKE 1 TABLET DAILY, Disp: 90 tablet, Rfl: 3    venlafaxine (EFFEXOR) 37 5 mg tablet, Take 1 tablet (37 5 mg total) by mouth 2 (two) times a day, Disp: 180 tablet, Rfl: 1      Wt Readings from Last 3 Encounters:   02/04/20 93 6 kg (206 lb 6 4 oz)   01/26/20 93 kg (205 lb)   01/20/20 95 kg (209 lb 8 oz)     Temp Readings from Last 3 Encounters:   01/26/20 99 °F (37 2 °C) (Oral)   01/20/20 (!) 94 5 °F (34 7 °C) (Tympanic)   01/17/20 (!) 96 6 °F (35 9 °C) (Temporal)     BP Readings from Last 3 Encounters:   02/04/20 158/62   01/26/20 138/74   01/20/20 142/62     Pulse Readings from Last 3 Encounters:   02/04/20 68   01/26/20 82   01/20/20 60         Physical Exam   Constitutional: She is oriented to person, place, and time  She appears well-developed  HENT:   Head: Atraumatic  Eyes: EOM are normal    Neck: Normal range of motion  Cardiovascular: Normal rate, regular rhythm and normal heart sounds  Exam reveals no gallop  No murmur heard  1+ LLE edema   Pulmonary/Chest: Effort normal and breath sounds normal    Abdominal: Soft  Musculoskeletal: Normal range of motion  Neurological: She is alert and oriented to person, place, and time  Skin: Skin is warm and dry  Psychiatric: She has a normal mood and affect           Laboratory Studies:  CMP:  Lab Results   Component Value Date     11/23/2015    K 3 6 01/16/2020     01/16/2020    CO2 31 01/16/2020    ANIONGAP 9 11/23/2015    BUN 18 01/16/2020    CREATININE 1 30 01/16/2020    GLUCOSE 149 (H) 11/23/2015    AST 35 01/16/2020    ALT 34 01/16/2020    BILITOT 0 65 11/23/2015    EGFR 41 01/16/2020       Lipid Profile:   Lab Results   Component Value Date    CHOL 229 11/23/2015     Lab Results   Component Value Date    HDL 44 10/01/2019     Lab Results   Component Value Date    LDLCALC 150 (H) 10/01/2019     Lab Results   Component Value Date    TRIG 126 10/01/2019       Cardiac testing:   EKG reviewed personally: NSR 64  Nml  Results for orders placed during the hospital encounter of 08/27/16   Echo complete with contrast if indicated    Narrative [place] , [place] [address]  [phone]    Transthoracic Echocardiogram  2D, M-mode, Doppler, and Color Doppler    Study date:  28-Aug-2016    Patient: Flash Kumar  MR number: [id number]  Account number: [de-identified]  : 1950  Age: 74 years  Gender: Female  Status: Inpatient  Location: Bedside  Height: 65 in  Weight: 215 6 lb  BP: 133/ 66 mmHg    Indications: AFIB    Diagnoses: I48 0 - Atrial fibrillation    Sonographer:  AKI Unger  Primary Physician:  Samantha Noe DO  Referring Physician:  Eva Cartagena MD  Group:  [facility]  Interpreting Physician:  Alcon Guillermo MD    SUMMARY    LEFT VENTRICLE:  Systolic function was normal by visual assessment  Ejection fraction was  estimated to be 60 %  There were no regional wall motion abnormalities  Wall thickness was mildly increased  Features were consistent with a pseudonormal left ventricular filling pattern,  with concomitant abnormal relaxation and increased filling pressure (grade 2  diastolic dysfunction)  RIGHT VENTRICLE:  Systolic pressure was mildly increased  Estimated peak pressure was 35 mmHg  TRICUSPID VALVE:  There was mild regurgitation  HISTORY: PRIOR HISTORY: Hyperlipidemia, GERD, Hypertension, Frozen Shoulder,  Diabetes    PROCEDURE: The procedure was performed at the bedside  This was a routine  study  The transthoracic approach was used  The study included complete 2D  imaging, M-mode, complete spectral Doppler, and color Doppler  The heart rate  was 80 bpm, at the start of the study  Images were obtained from the  parasternal, apical, subcostal, and suprasternal notch acoustic windows  Echocardiographic views were limited due to poor acoustic window availability  and lung interference  This was a technically difficult study  LEFT VENTRICLE: Size was normal  Systolic function was normal by visual  assessment  Ejection fraction was estimated to be 60 %  There were no regional  wall motion abnormalities  Wall thickness was mildly increased  DOPPLER: The  ratio of early ventricular filling to atrial contraction velocities was within  the normal range  Features were consistent with a pseudonormal left ventricular  filling pattern, with concomitant abnormal relaxation and increased filling  pressure (grade 2 diastolic dysfunction)  RIGHT VENTRICLE: The size was normal  Systolic function was normal  DOPPLER:  Systolic pressure was mildly increased  Estimated peak pressure was 35 mmHg  LEFT ATRIUM: Size was normal     RIGHT ATRIUM: Size was normal     MITRAL VALVE: Valve structure was normal  There was normal leaflet separation  DOPPLER: The transmitral velocity was within the normal range  There was no  evidence for stenosis  There was no regurgitation  AORTIC VALVE: There was no left ventricular outflow obstruction  The valve was  trileaflet  Leaflets exhibited normal thickness and normal cuspal separation  DOPPLER: Transaortic velocity was within the normal range  There was no  evidence for stenosis  There was no regurgitation  TRICUSPID VALVE: The valve structure was normal  There was normal leaflet  separation  DOPPLER: The transtricuspid velocity was within the normal range  There was no evidence for stenosis  There was mild regurgitation  PULMONIC VALVE: Leaflets exhibited normal thickness, no calcification, and  normal cuspal separation  DOPPLER: The transpulmonic velocity was within the  normal range  There was no regurgitation  PERICARDIUM: There was no pericardial effusion  AORTA: The root exhibited normal size  SYSTEMIC VEINS: IVC: The inferior vena cava was normal in size and course    Respirophasic changes were normal     SYSTEM MEASUREMENT TABLES    2D  %FS: 32 76 %  AV Diam: 2 65 cm  EDV(Teich): 66 2 ml  EF(Teich): 61 86 %  ESV(Teich): 25 25 ml  IVSd: 1 17 cm  LA Area: 14 32 cm2  LA Diam: 3 26 cm  LVEDV MOD A4C: 79 24 ml  LVEF MOD A4C: 67 92 %  LVESV MOD A4C: 25 42 ml  LVIDd: 3 91 cm  LVIDs: 2 63 cm  LVLd A4C: 7 4 cm  LVLs A4C: 6 11 cm  LVPWd: 1 08 cm  RA Area: 12 29 cm2  RVIDd: 2 97 cm  SV MOD A4C: 53 82 ml  SV(Teich): 40 95 ml    CW  TR MaxP 89 mmHg  TR Vmax: 2 82 m/s    MM  TAPSE: 2 03 cm    PW  MV A Eric: 0 75 m/s  MV Dec Bracken: 3 85 m/s2  MV DecT: 204 26 ms  MV E Eric: 0 79 m/s  MV E/A Ratio: 1 05  MV PHT: 59 23 ms  MVA By PHT: 3 71 cm2    [facility] Accredited Echocardiography Laboratory    Prepared and electronically signed by    Jackie Suarez MD  Signed [id number] 12:09:14

## 2020-02-04 NOTE — PATIENT INSTRUCTIONS
Recommendations:  1  Start lisinopril 10mg daily  2  Continue remainder of medications  3  Check fasting lipid panel and complete metabolic profile  4  Follow up in 6 months

## 2020-02-05 ENCOUNTER — APPOINTMENT (OUTPATIENT)
Dept: LAB | Facility: CLINIC | Age: 70
End: 2020-02-05
Payer: MEDICARE

## 2020-02-05 DIAGNOSIS — C34.92 ADENOCARCINOMA OF LEFT LUNG, STAGE 4 (HCC): ICD-10-CM

## 2020-02-05 DIAGNOSIS — E78.5 DYSLIPIDEMIA: ICD-10-CM

## 2020-02-05 LAB
ALBUMIN SERPL BCP-MCNC: 2.9 G/DL (ref 3.5–5)
ALP SERPL-CCNC: 76 U/L (ref 46–116)
ALT SERPL W P-5'-P-CCNC: 36 U/L (ref 12–78)
ANION GAP SERPL CALCULATED.3IONS-SCNC: 10 MMOL/L (ref 4–13)
AST SERPL W P-5'-P-CCNC: 34 U/L (ref 5–45)
BASOPHILS # BLD AUTO: 0.02 THOUSANDS/ΜL (ref 0–0.1)
BASOPHILS NFR BLD AUTO: 0 % (ref 0–1)
BILIRUB SERPL-MCNC: 0.29 MG/DL (ref 0.2–1)
BUN SERPL-MCNC: 18 MG/DL (ref 5–25)
CALCIUM SERPL-MCNC: 9.4 MG/DL (ref 8.3–10.1)
CHLORIDE SERPL-SCNC: 107 MMOL/L (ref 100–108)
CHOLEST SERPL-MCNC: 204 MG/DL (ref 50–200)
CO2 SERPL-SCNC: 28 MMOL/L (ref 21–32)
CREAT SERPL-MCNC: 1.51 MG/DL (ref 0.6–1.3)
EOSINOPHIL # BLD AUTO: 0.05 THOUSAND/ΜL (ref 0–0.61)
EOSINOPHIL NFR BLD AUTO: 1 % (ref 0–6)
ERYTHROCYTE [DISTWIDTH] IN BLOOD BY AUTOMATED COUNT: 15.6 % (ref 11.6–15.1)
GFR SERPL CREATININE-BSD FRML MDRD: 35 ML/MIN/1.73SQ M
GLUCOSE P FAST SERPL-MCNC: 149 MG/DL (ref 65–99)
HCT VFR BLD AUTO: 34.4 % (ref 34.8–46.1)
HDLC SERPL-MCNC: 40 MG/DL
HGB BLD-MCNC: 10.9 G/DL (ref 11.5–15.4)
IMM GRANULOCYTES # BLD AUTO: 0.02 THOUSAND/UL (ref 0–0.2)
IMM GRANULOCYTES NFR BLD AUTO: 0 % (ref 0–2)
LDLC SERPL CALC-MCNC: 129 MG/DL (ref 0–100)
LYMPHOCYTES # BLD AUTO: 0.98 THOUSANDS/ΜL (ref 0.6–4.47)
LYMPHOCYTES NFR BLD AUTO: 20 % (ref 14–44)
MCH RBC QN AUTO: 30.9 PG (ref 26.8–34.3)
MCHC RBC AUTO-ENTMCNC: 31.7 G/DL (ref 31.4–37.4)
MCV RBC AUTO: 98 FL (ref 82–98)
MONOCYTES # BLD AUTO: 0.58 THOUSAND/ΜL (ref 0.17–1.22)
MONOCYTES NFR BLD AUTO: 12 % (ref 4–12)
NEUTROPHILS # BLD AUTO: 3.22 THOUSANDS/ΜL (ref 1.85–7.62)
NEUTS SEG NFR BLD AUTO: 67 % (ref 43–75)
NRBC BLD AUTO-RTO: 0 /100 WBCS
PLATELET # BLD AUTO: 282 THOUSANDS/UL (ref 149–390)
PMV BLD AUTO: 10.4 FL (ref 8.9–12.7)
POTASSIUM SERPL-SCNC: 3.7 MMOL/L (ref 3.5–5.3)
PROT SERPL-MCNC: 6.3 G/DL (ref 6.4–8.2)
RBC # BLD AUTO: 3.53 MILLION/UL (ref 3.81–5.12)
SODIUM SERPL-SCNC: 145 MMOL/L (ref 136–145)
TRIGL SERPL-MCNC: 176 MG/DL
WBC # BLD AUTO: 4.87 THOUSAND/UL (ref 4.31–10.16)

## 2020-02-05 PROCEDURE — 85025 COMPLETE CBC W/AUTO DIFF WBC: CPT

## 2020-02-05 PROCEDURE — 80053 COMPREHEN METABOLIC PANEL: CPT

## 2020-02-05 PROCEDURE — 80061 LIPID PANEL: CPT

## 2020-02-05 PROCEDURE — 36415 COLL VENOUS BLD VENIPUNCTURE: CPT

## 2020-02-06 ENCOUNTER — TELEPHONE (OUTPATIENT)
Dept: INFUSION CENTER | Facility: HOSPITAL | Age: 70
End: 2020-02-06

## 2020-02-06 ENCOUNTER — TELEPHONE (OUTPATIENT)
Dept: HEMATOLOGY ONCOLOGY | Facility: CLINIC | Age: 70
End: 2020-02-06

## 2020-02-06 NOTE — TELEPHONE ENCOUNTER
Patient called requesting lab results  Labs draw date: 02/05  Labs drawn at:  [facility]  Patient's call back #  [phone]

## 2020-02-07 ENCOUNTER — HOSPITAL ENCOUNTER (OUTPATIENT)
Dept: INFUSION CENTER | Facility: CLINIC | Age: 70
Discharge: HOME | End: 2020-02-07
Payer: MEDICARE

## 2020-02-07 VITALS
OXYGEN SATURATION: 97 % | TEMPERATURE: 98.2 F | HEIGHT: 64 IN | BODY MASS INDEX: 35.59 KG/M2 | DIASTOLIC BLOOD PRESSURE: 64 MMHG | HEART RATE: 75 BPM | WEIGHT: 208.5 LBS | SYSTOLIC BLOOD PRESSURE: 108 MMHG | RESPIRATION RATE: 16 BRPM

## 2020-02-07 DIAGNOSIS — C34.92 ADENOCARCINOMA OF LEFT LUNG, STAGE 4 (HCC): Primary | ICD-10-CM

## 2020-02-07 PROCEDURE — 96409 CHEMO IV PUSH SNGL DRUG: CPT

## 2020-02-07 PROCEDURE — 96367 TX/PROPH/DG ADDL SEQ IV INF: CPT

## 2020-02-07 RX ORDER — SODIUM CHLORIDE 9 MG/ML
20 INJECTION, SOLUTION INTRAVENOUS ONCE
Status: COMPLETED | OUTPATIENT
Start: 2020-02-07 | End: 2020-02-07

## 2020-02-07 RX ADMIN — SODIUM CHLORIDE 1000 MG: 9 INJECTION, SOLUTION INTRAVENOUS at 11:10

## 2020-02-07 RX ADMIN — SODIUM CHLORIDE 20 ML/HR: 0.9 INJECTION, SOLUTION INTRAVENOUS at 10:25

## 2020-02-07 RX ADMIN — ONDANSETRON 16 MG: 2 INJECTION INTRAMUSCULAR; INTRAVENOUS at 10:26

## 2020-02-12 ENCOUNTER — TELEPHONE (OUTPATIENT)
Dept: OBGYN CLINIC | Facility: CLINIC | Age: 70
End: 2020-02-12

## 2020-02-12 NOTE — TELEPHONE ENCOUNTER
Patient thinks she is having a yeast infection - thick white discharge and itching  States she has had for 6 months  Appt given for tomorrow  Has seen Dr Karl Goodman in the past and has an annual scheduled for 3/2/20  Routing to provider to update

## 2020-02-12 NOTE — TELEPHONE ENCOUNTER
Left message on nurse line states she has a yeast infection, but also has Stage 4 lung cancer and is on a lot of medications  Reviewed chart unsure when patient was here last     RC left message for patient to call the office

## 2020-02-13 ENCOUNTER — OFFICE VISIT (OUTPATIENT)
Dept: OBGYN CLINIC | Facility: CLINIC | Age: 70
End: 2020-02-13
Payer: MEDICARE

## 2020-02-13 VITALS
HEIGHT: 64 IN | BODY MASS INDEX: 35.41 KG/M2 | WEIGHT: 207.4 LBS | DIASTOLIC BLOOD PRESSURE: 78 MMHG | SYSTOLIC BLOOD PRESSURE: 136 MMHG

## 2020-02-13 DIAGNOSIS — N76.0 BACTERIAL VAGINOSIS: Primary | ICD-10-CM

## 2020-02-13 DIAGNOSIS — B37.3 VAGINA, CANDIDIASIS: ICD-10-CM

## 2020-02-13 DIAGNOSIS — B96.89 BACTERIAL VAGINOSIS: Primary | ICD-10-CM

## 2020-02-13 PROCEDURE — 4040F PNEUMOC VAC/ADMIN/RCVD: CPT | Performed by: OBSTETRICS & GYNECOLOGY

## 2020-02-13 PROCEDURE — 3008F BODY MASS INDEX DOCD: CPT | Performed by: OBSTETRICS & GYNECOLOGY

## 2020-02-13 PROCEDURE — 3078F DIAST BP <80 MM HG: CPT | Performed by: OBSTETRICS & GYNECOLOGY

## 2020-02-13 PROCEDURE — 99213 OFFICE O/P EST LOW 20 MIN: CPT | Performed by: OBSTETRICS & GYNECOLOGY

## 2020-02-13 PROCEDURE — 1160F RVW MEDS BY RX/DR IN RCRD: CPT | Performed by: OBSTETRICS & GYNECOLOGY

## 2020-02-13 PROCEDURE — 1036F TOBACCO NON-USER: CPT | Performed by: OBSTETRICS & GYNECOLOGY

## 2020-02-13 PROCEDURE — 2022F DILAT RTA XM EVC RTNOPTHY: CPT | Performed by: OBSTETRICS & GYNECOLOGY

## 2020-02-13 PROCEDURE — 3075F SYST BP GE 130 - 139MM HG: CPT | Performed by: OBSTETRICS & GYNECOLOGY

## 2020-02-13 RX ORDER — METRONIDAZOLE 7.5 MG/G
1 GEL VAGINAL
Qty: 5 G | Refills: 0 | Status: SHIPPED | OUTPATIENT
Start: 2020-02-13 | End: 2020-03-30

## 2020-02-13 RX ORDER — FLUCONAZOLE 150 MG/1
150 TABLET ORAL WEEKLY
Qty: 52 TABLET | Refills: 0 | Status: SHIPPED | OUTPATIENT
Start: 2020-02-13 | End: 2020-07-23 | Stop reason: ALTCHOICE

## 2020-02-13 NOTE — PROGRESS NOTES
Ms Francisco Sorto is a 68 y o  yo female who presents for vaginal irritation  Her symptoms started 6 months ago  Alleviating factors: None  Aggravating factors: none  Severity: mod  Progression: worsening  Quality irritation, odor    ROS:   She denies hematuria, dysuria, constipation, diarrhea, fevers, chills, nausea or emesis  Past Medical History:   Diagnosis Date    Arthritis     [facility] ([facility] )     Diabetes mellitus ([facility] )     Diabetes mellitus type 2, uncomplicated ([facility] )     Last assessed: 17    Essential hypertension     Frozen shoulder     L shoulder    GERD (gastroesophageal reflux disease)     Hx of cancer of uterus     Last assessed: 8/21/15    Hyperlipidemia     Hyponatremia 2016    Lung mass     diagnosed 2016    Malignant neoplasm without specification of site [facility])     Skin cancer, basal cell     right eye area    Stage 4 lung cancer ([place] [address] )      Social History     Socioeconomic History    Marital status:       Spouse name: Not on file    Number of children: Not on file    Years of education: Not on file    Highest education level: Not on file   Occupational History    Occupation: retired   Social Needs    Financial resource strain: Not on file    Food insecurity:     Worry: Not on file     Inability: Not on file   [employer] needs:     Medical: Not on file     Non-medical: Not on file   Tobacco Use    Smoking status: Former Smoker     Packs/day: 1 00     Years: 50 00     Pack years: 50 00     Types: Cigarettes     Start date:      Last attempt to quit: 2000     Years since quittin 1    Smokeless tobacco: Never Used    Tobacco comment: Quit in    Substance and Sexual Activity    Alcohol use: No    Drug use: No    Sexual activity: Not Currently   Lifestyle    Physical activity:     Days per week: Not on file     Minutes per session: Not on file    Stress: Not on file   Relationships    Social connections:     Talks on phone: Not on file     Gets together: Not on file     Attends Yazdanism service: Not on file     Active member of club or organization: Not on file     Attends meetings of clubs or organizations: Not on file     Relationship status: Not on file    Intimate partner violence:     Fear of current or ex partner: Not on file     Emotionally abused: Not on file     Physically abused: Not on file     Forced sexual activity: Not on file   Other Topics Concern    Not on file   Social History Narrative    Lives with family  Daily caffeinated coffee consumption       /78 (BP Location: Right arm, Patient Position: Sitting, Cuff Size: Standard)   Ht 5' 4" (1 626 m)   Wt 94 1 kg (207 lb 6 4 oz)   LMP  (LMP Unknown)   BMI 35 60 kg/m²     GEN: The patient was alert and oriented x3, pleasant well-appearing female in no acute distress  Pelvic: surgically absent uterus and cervix    No palpable adnexal masses  Wet prep was obtained  Cultures for gonorrhea and chlamydia collected  Wet Prep:  pH 7,  Clue cells many, Hyphae many    A/P:  68 y o  yo female with bacterial vaginosis, and yeast     1   Metrogel  2  [id number] weekly - chronic steroid use, DM  3   Reviewed perineal hygiene - barrier cream

## 2020-02-14 ENCOUNTER — HOSPITAL ENCOUNTER (OUTPATIENT)
Dept: ULTRASOUND IMAGING | Facility: HOSPITAL | Age: 70
Discharge: HOME | End: 2020-02-14
Attending: INTERNAL MEDICINE
Payer: MEDICARE

## 2020-02-14 ENCOUNTER — HOSPITAL ENCOUNTER (OUTPATIENT)
Dept: RADIOLOGY | Facility: HOSPITAL | Age: 70
Discharge: HOME | End: 2020-02-14
Attending: INTERNAL MEDICINE
Payer: MEDICARE

## 2020-02-14 ENCOUNTER — TELEPHONE (OUTPATIENT)
Dept: FAMILY MEDICINE CLINIC | Facility: CLINIC | Age: 70
End: 2020-02-14

## 2020-02-14 ENCOUNTER — TELEPHONE (OUTPATIENT)
Dept: HEMATOLOGY ONCOLOGY | Facility: CLINIC | Age: 70
End: 2020-02-14

## 2020-02-14 DIAGNOSIS — M79.89 LEFT LEG SWELLING: ICD-10-CM

## 2020-02-14 DIAGNOSIS — M79.89 LEFT LEG SWELLING: Primary | ICD-10-CM

## 2020-02-14 PROCEDURE — 73564 X-RAY EXAM KNEE 4 OR MORE: CPT

## 2020-02-14 PROCEDURE — 93970 EXTREMITY STUDY: CPT

## 2020-02-14 NOTE — TELEPHONE ENCOUNTER
Call transferred from [facility], [facility] with Radiology reporting results are negative for DVT and Duval Cyst that was done today  Results are in [facility]  Thomas Mora RN contacted via IM and tasked

## 2020-02-14 NOTE — TELEPHONE ENCOUNTER
Spoke to patient advised of orders  She said she read this may be a side effect from Chemo she will call Dr Marty Ruiz office to see what they say

## 2020-02-14 NOTE — TELEPHONE ENCOUNTER
Called patient to review her left leg swelling  Ever since she fell on [place] her left leg and knee are swollen  Patient called pcp office for an appt but was concerned this was from her chemo  Placed an order for a doppler of her lower extremities to rule out a clot due to her hx of cancer  Patient will have the [address] done at [place] today and will have a repeat xray of her knee completed  Called patient back and left a VM reviewing this

## 2020-02-14 NOTE — TELEPHONE ENCOUNTER
Telephone Triage-Symptom Call      [address]  1950  [phone]    Caller:   Patient   Chief Complaint: Left Leg Swelling   Current Treatment patient:  Yes  Name of treatment:  Pembrolizumab and Pemetrexed Q 3 weeks   Date of last treatment: 2/7/2020, due 2/28/20  Recent illness or Surgery:  Recent Fall to the Left Leg on       Description of symptoms per ONS Guidelines review (charting by exception):   Patient called the [facility] line stating she is having left leg swelling from the knee down  Recent Fall sustained 3 weeks ago, has been swelling all along  Went to the ER on 1/26 20  Denies any redness or warmth  Patient is on Elquis 5mg BID   No bleeding or bruising per patient  Able to ambulate without difficulty  Initially applied ice to the area 3 weeks ago  Still swollen  Denies any pain unless she moves it  History of Left Knee arthritis  Has been keeping it elevated per patient  Guidelines followed: Yes    Disposition:   Patient was seen in the ER on 1/26/20, was told to take tylenol  Xray was done the left knee only  Per patient leg remains  Instructed that I will contact MD office for further recommendation since she was evaluated by this in the ER prior  NEXT STEPS REQUIRED:    Please check with MD and advise this patient since she this was evaluated  Any recommendations appreciated       Patient verbalizes understanding and is in agreement with plan: YES    Verified that guidelines were completed and documented: YES

## 2020-02-14 NOTE — TELEPHONE ENCOUNTER
Sarah Diaz had a fall 3 weeks ago  An X-Ray was done & was negative  She now had a swollen L Leg, pink in color, not painful to stand on but painful to move, skin taut  No one has any appts, what does Dr Jaleesa Mccann advise? Thank you!

## 2020-02-14 NOTE — TELEPHONE ENCOUNTER
Jeremias Collier had a fall 3 weeks ago  An X-Ray was done & was negative      She now had a swollen L Leg, pink in color, not painful to stand on but painful to move, skin taut      No one has any appts, what does Dr Jorge Alberto Skaggs advise?     Thank you!

## 2020-02-14 NOTE — TELEPHONE ENCOUNTER
Called patient and left a VM informing her that she does not have a clot  Instructed her to f/u with her pcp

## 2020-02-15 PROCEDURE — 93970 EXTREMITY STUDY: CPT | Performed by: SURGERY

## 2020-02-17 ENCOUNTER — OFFICE VISIT (OUTPATIENT)
Dept: FAMILY MEDICINE CLINIC | Facility: CLINIC | Age: 70
End: 2020-02-17
Payer: MEDICARE

## 2020-02-17 VITALS
RESPIRATION RATE: 15 BRPM | DIASTOLIC BLOOD PRESSURE: 74 MMHG | BODY MASS INDEX: 35.1 KG/M2 | OXYGEN SATURATION: 98 % | SYSTOLIC BLOOD PRESSURE: 150 MMHG | WEIGHT: 205.6 LBS | TEMPERATURE: 97.4 F | HEART RATE: 75 BPM | HEIGHT: 64 IN

## 2020-02-17 DIAGNOSIS — E87.6 HYPOKALEMIA: ICD-10-CM

## 2020-02-17 DIAGNOSIS — R22.42 LOCALIZED SWELLING OF LEFT FOOT: Primary | ICD-10-CM

## 2020-02-17 PROCEDURE — 99213 OFFICE O/P EST LOW 20 MIN: CPT | Performed by: FAMILY MEDICINE

## 2020-02-17 PROCEDURE — 3008F BODY MASS INDEX DOCD: CPT | Performed by: FAMILY MEDICINE

## 2020-02-17 PROCEDURE — 2022F DILAT RTA XM EVC RTNOPTHY: CPT | Performed by: FAMILY MEDICINE

## 2020-02-17 PROCEDURE — 1160F RVW MEDS BY RX/DR IN RCRD: CPT | Performed by: FAMILY MEDICINE

## 2020-02-17 PROCEDURE — 3051F HG A1C>EQUAL 7.0%<8.0%: CPT | Performed by: FAMILY MEDICINE

## 2020-02-17 PROCEDURE — 3077F SYST BP >= 140 MM HG: CPT | Performed by: FAMILY MEDICINE

## 2020-02-17 PROCEDURE — 1036F TOBACCO NON-USER: CPT | Performed by: FAMILY MEDICINE

## 2020-02-17 PROCEDURE — 3078F DIAST BP <80 MM HG: CPT | Performed by: FAMILY MEDICINE

## 2020-02-17 PROCEDURE — 4040F PNEUMOC VAC/ADMIN/RCVD: CPT | Performed by: FAMILY MEDICINE

## 2020-02-17 RX ORDER — FUROSEMIDE 40 MG/1
40 TABLET ORAL AS NEEDED
Qty: 30 TABLET | Refills: 0 | Status: SHIPPED | OUTPATIENT
Start: 2020-02-17 | End: 2020-02-18 | Stop reason: SDUPTHER

## 2020-02-17 RX ORDER — POTASSIUM CHLORIDE 20 MEQ/1
20 TABLET, EXTENDED RELEASE ORAL DAILY
Qty: 30 TABLET | Refills: 5 | Status: SHIPPED | OUTPATIENT
Start: 2020-02-17 | End: 2020-02-24 | Stop reason: SDUPTHER

## 2020-02-17 NOTE — PROGRESS NOTES
Assessment/Plan:    1  Localized swelling of left foot  Assessment & Plan:  Start lasix for 3-4 days  Call with update  Not likely injury from fall but will check xray foot    Orders:  -     XR foot 3+ vw left; Future; Expected date: 02/17/2020  -     furosemide (LASIX) 40 mg tablet; Take 1 tablet (40 mg total) by mouth as needed (swelling)    2  Hypokalemia  -     potassium chloride (K-DUR,KLOR-CON) 20 mEq tablet; Take 1 tablet (20 mEq total) by mouth daily         There are no Patient Instructions on file for this visit  No follow-ups on file  Subjective:      Patient ID: Earle Bojorquez is a 68 y o  female  Chief Complaint   Patient presents with    Foot Swelling     Patient here with left foot swelling red hard to the touch        3 weeks ago was in driveway  Walked on black ice  Landed on left hip  Doesn't remember foot hitting  Foot and ankle started swelling last week- 3 weeks later  Doppler is negative  Stopped steroids last week, started swelling      Leg Pain    The incident occurred more than 1 week ago  There was no injury mechanism (did fall 3 weeks ago)  The pain is present in the left foot and left ankle  The pain is at a severity of 2/10  The pain is mild  Pertinent negatives include no inability to bear weight  She reports no foreign bodies present  She has tried nothing for the symptoms  The following portions of the patient's history were reviewed and updated as appropriate:  past social history    Review of Systems   Constitutional: Negative  HENT: Negative  Eyes: Negative  Respiratory: Negative  Cardiovascular: Positive for leg swelling  Gastrointestinal: Negative  Endocrine: Negative  Genitourinary: Negative  Musculoskeletal: Positive for joint swelling  Skin: Negative  Allergic/Immunologic: Negative  Neurological: Negative  Hematological: Negative  Psychiatric/Behavioral: Negative            Current Outpatient Medications   Medication Sig Dispense Refill    acetaminophen (TYLENOL) 325 mg tablet Take 650 mg by mouth every 6 (six) hours as needed for mild pain      apixaban (ELIQUIS) 5 mg TAKE 1 TABLET TWICE A  tablet 3    Calcium Carb-Cholecalciferol 600-800 MG-UNIT TABS Take 1 tablet by mouth 2 (two) times a day      Cholecalciferol (VITAMIN D) 2000 units CAPS Take by mouth      cyanocobalamin (VITAMIN B-12) 100 mcg tablet Take by mouth daily      dexamethasone (DECADRON) 4 mg tablet TAKE ONE TAB TWICE A DAY WITH FOOD, DAY BEFORE,DAY OF,DAY AFTER CHEMO 30 tablet 1    Empagliflozin 10 MG TABS Take 1 tablet (10 mg total) by mouth every morning 30 tablet 5    fluconazole (DIFLUCAN) 150 mg tablet Take 1 tablet (150 mg total) by mouth once a week for 52 doses 52 tablet 0    Folic Acid 0 8 MG CAPS       furosemide (LASIX) 40 mg tablet Take 1 tablet (40 mg total) by mouth as needed (swelling) 30 tablet 0    lisinopril (ZESTRIL) 10 mg tablet Take 1 tablet (10 mg total) by mouth daily 90 tablet 3    metFORMIN (GLUCOPHAGE) 1000 MG tablet TAKE 1 TABLET TWICE DAILY  WITH MEALS 180 tablet 3    metoprolol tartrate (LOPRESSOR) 25 mg tablet TAKE 1 TABLET EVERY 12     HOURS 180 tablet 3    metroNIDAZOLE (METROGEL) 0 75 % vaginal gel Insert 1 application into the vagina daily at bedtime for 5 days 5 g 0    nystatin (MYCOSTATIN) cream Apply topically 2 (two) times a day 30 g 5    omeprazole (PriLOSEC) 20 mg delayed release capsule TAKE 1 CAPSULE DAILY 90 capsule 3    ondansetron (ZOFRAN ODT) 4 mg disintegrating tablet as needed       sotalol (BETAPACE) 80 mg tablet TAKE 1 TABLET EVERY 12     HOURS 180 tablet 3    TRADJENTA 5 MG TABS TAKE 1 TABLET DAILY 90 tablet 3    venlafaxine (EFFEXOR) 37 5 mg tablet Take 1 tablet (37 5 mg total) by mouth 2 (two) times a day 180 tablet 1    potassium chloride (K-DUR,KLOR-CON) 20 mEq tablet Take 1 tablet (20 mEq total) by mouth daily 30 tablet 5     No current facility-administered medications for this visit  Objective:    /74   Pulse 75   Temp (!) 97 4 °F (36 3 °C)   Resp 15   Ht 5' 4" (1 626 m)   Wt 93 3 kg (205 lb 9 6 oz)   LMP  (LMP Unknown)   SpO2 98%   BMI 35 29 kg/m²      Physical Exam   Constitutional: She appears well-developed and well-nourished  HENT:   Head: Normocephalic and atraumatic  Eyes: Pupils are equal, round, and reactive to light  EOM are normal    Neck: Normal range of motion  Neck supple  Cardiovascular: Normal rate, regular rhythm, normal heart sounds and intact distal pulses  Pulmonary/Chest: Effort normal and breath sounds normal    Abdominal: Soft  Bowel sounds are normal    Musculoskeletal: She exhibits edema (left foot/ankle with pitting edema)  Neurological: She is alert  Nursing note and vitals reviewed            Xochitl Simeon DO

## 2020-02-18 ENCOUNTER — HOSPITAL ENCOUNTER (OUTPATIENT)
Dept: RADIOLOGY | Facility: HOSPITAL | Age: 70
Discharge: HOME | End: 2020-02-18
Payer: MEDICARE

## 2020-02-18 DIAGNOSIS — R22.42 LOCALIZED SWELLING OF LEFT FOOT: ICD-10-CM

## 2020-02-18 PROCEDURE — 73630 X-RAY EXAM OF FOOT: CPT

## 2020-02-18 RX ORDER — FUROSEMIDE 40 MG/1
40 TABLET ORAL DAILY
Qty: 30 TABLET | Refills: 0 | Status: SHIPPED | OUTPATIENT
Start: 2020-02-18 | End: 2020-02-24 | Stop reason: SDUPTHER

## 2020-02-21 NOTE — PROGRESS NOTES
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 of the lung with left scapula involvement diagnosed on 08/2016 PDL expression more than 50%, negative for EGFR mutation, ALK  rearrangement, Ros1 mutation     Treated initially with Pembrolizumab complicated with pneumonitis and later on nivolumab with prednisone 10 mg p o  Daily with excellent response      Disease progression in August 2018 in the left scapula with pain no new lesions by PET scan  Status post radiation therapy to the left scapula and treated with Alimta 500 milligram/meter squared, carboplatin AUC 5  After 3 cycles,  CT scan in January 2019 showed stable disease, currently on maintenance Alimta 500 milligram/meter squared every 3 weeks  Cycle #23 due 2/28/2020  Her creatinine had been 0 95-1 1 correlating with creatinine clearance in the 50s to 60s in 2019  Her creatinine has been creeping up slowly 1 21 3 in the earlier part of 2020 with creatinine clearance around 45 milliliter/minute  Her creatinine February 20th was 1 5 with creatinine clearance of 35 mL/minute  Will dose reduce Alimta to 400mg/m2  Discussed possible evaluation by Nephrology  At this time, will continue to monitor her creatinine  Follow-up in approximately 8 weeks with repeat CT scan of her chest         2  Cystic mass of the body of the pancreas had been there for many years, growing slowly, continue watchful observation     3  Overall deconditioning  Will arrange for physical therapy              HPI: Elias Ventura was admitted to the hospital with arrhythmia and was found to have right lower lobe infiltrate in August 2016   She wasreated with antibiotics however repeat chest x-ray showed persistent right lower lobe infiltrate  Subsequently the patient had a CT scan of the chest which showed a right perihilar mass, subcarinal lymphadenopathy, lytic lesion of the right costovertebral junction at T10 level   PET scan October 2016 showed a right perihilar mass measuring 3 5 cm with SUV of 8 9, subcarinal lymph nodes measuring 3 4 x 2 2 cm with SUV of 9 2  Nodule in the left upper lobe lung measured 2 x 1 1 cm   There was a lytic lesion involving the right 10th costovertebral junction with SUV of 14  4      Biopsy showed non-small cell carcinoma with features suggesting of adenocarcinoma positive for CK 7, CK 19, CA-19-9, ANEUDY-3, partially positive for P40, p63, negative for TTF-1   She had a history of uterine cancer in 2000 status post hysterectomy and did not require radiation or chemotherapy   She is status post bilateral oophorectomy, right knee replacement,   tonsillectomy  She used to smoke for 35 years 1 pack per day however quit smoking 21 years ago   She used hormonal replacement therapy for 3 years  Arabella Ramirez has a family history significant for skin cancer in her father and coronary artery disease in mother  Arabella Ramirez has 2 healthy children  Treated initially with Pembrolizumab December 2016, Finished in May 2017 secondary to grade 3 pneumonitis  Initiated on prednisone  Progression: Nivolumab 240 mg flat dose every 2 weeks along with prednisone 10 mg p o  Daily initiated April 2018- October 2018  with excellent response  Disease progression in August 2018 in the left scapula with pain no new lesions by PET scan  Status post radiation therapy to the left scapula and treated with Alimta 500 milligram/meter squared, carboplatin AUC 5  After 3 cycles,  CT scan in January 2019 showed stable disease  Carbo discontinued 3/2019  Maintenance Alimta 500 milligram/meter squared every 3 weeks 3/2019- present      She was on prednisone 5 mg p o  daily because of previous history of pneumonitis, CT scan chest 1/3/2020 showed no evidence of infiltration in the lung parenchyma, prednisone was reduced every other day for 1 month and then discontinued          Liquid biopsy showed K-MADHURI mutation G12C, no evidence of MSI high     Interval History:    Fell on black ice and twisted her left ankle  This is slowly improving  X-ray and Doppler examination unrevealing  She does have some increased swelling by the end of the day  Nervously anticipating her trip in June to [facility] to visit family  Concerned about her stamina  Breathing well  Fatigue continues  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 who has health issues of his own            Previous Treatment:    1   Pembrolizumab 200 mg IV flat dose every 3 weeks initiated in December 2016, Finished in May 2017 secondary to grade 3 pneumonitis   2   radiation therapy to the left scapula in October 2017  3   Nivolumab 240 mg flat dose every 2 weeks initiated in April 2018 secondary to progression of the disease in the lung and the left scapula, prednisone 10 mg p o  daily to prevent pneumonitis  Nivolumab was continued every 2 weeks because of previous history of pneumonitis on Pembrolizumab  4   Additional 10 radiation tx to left scapula finished in November 2018   5    Alimta/ Carbo 11/2018- 3/2019   6   Alimta maintenance 3/2019 - ongoing      Interval History:            Test Results:        Labs:   Lab Results   Component Value Date    HGB 10 9 (L) 02/05/2020    HCT 34 4 (L) 02/05/2020    MCV 98 02/05/2020     02/05/2020    WBC 4 87 02/05/2020    NRBC 0 02/05/2020     Lab Results   Component Value Date     11/23/2015    K 3 7 02/05/2020     02/05/2020    CO2 28 02/05/2020    ANIONGAP 9 11/23/2015    BUN 18 02/05/2020    CREATININE 1 51 (H) 02/05/2020    GLUCOSE 149 (H) 11/23/2015    GLUF 149 (H) 02/05/2020    CALCIUM 9 4 02/05/2020    AST 34 02/05/2020    ALT 36 02/05/2020    ALKPHOS 76 02/05/2020    PROT 6 8 11/23/2015    BILITOT 0 65 11/23/2015 EGFR 35 02/05/2020       Imaging: Xr Knee 4+ Vw Left Injury    Result Date: 2/17/2020  Narrative: LEFT KNEE INDICATION:   M79 89: Other specified soft tissue disorders  COMPARISON:  1/26/2020  VIEWS:  XR KNEE 4+ VW LEFT INJURY FINDINGS: There is no acute fracture or dislocation  There is a moderate joint effusion  Moderate tricompartmental osteoarthritis, evidenced by joint space narrowing, subchondral sclerosis, and small marginal osteophytes  Intra-articular loose body at the posterior aspect of the joint  No lytic or blastic lesions are seen  Meniscal chondrocalcinosis is again identified  Impression: No acute osseous abnormality  Moderate tricompartmental osteoarthritis and moderate-sized joint effusion, not significantly changed  Workstation performed: [id number]     Xr Knee 4+ Views Left    Result Date: 1/27/2020  Narrative: LEFT KNEE INDICATION:   fall  Left knee pain COMPARISON:  7/6/2018 VIEWS:  XR KNEE 4+ VW LEFT INJURY FINDINGS: There is no acute fracture or dislocation  There is a small to moderate-sized joint effusion There is worsening arthritis of the left knee with moderately severe lateral compartment narrowing, valgus deformity and chondrocalcinosis  Heterotopic calcification noted along the medial femoral condyle No lytic or blastic lesions are seen  Soft tissue swelling present     Impression: No acute fracture or dislocation Small to moderate joint effusion Worsening left knee arthritis Workstation performed: [id number]     Xr Foot 3+ Vw Left    Result Date: 2/18/2020  Narrative: LEFT FOOT INDICATION:   R22 42: Localized swelling, mass and lump, left lower limb  COMPARISON:  None VIEWS:  XR FOOT 3+ VW LEFT FINDINGS: There is no acute fracture or dislocation  Calcaneal spur and posterior plantar soft tissue calcifications  No lytic or blastic lesions seen  Soft tissues are unremarkable  Impression: No acute osseous abnormality   Workstation performed: [id number]     [facility] Limb Venous Duplex Study, Complete Bilateral    Result Date: 2/15/2020  Narrative:  [facility] REPORT CLINICAL: Indications: Bilateral Other specified soft tissue disorders [M79 89]  Patient presents with bilateral lower extremity edema on the left more than the right for 3 weeks  Operative History: Hysterectomy Risk Factors The patient has history of HTN and Diabetes (Yes)  She has no history of HLD, DVT or Recent Trauma  FINDINGS:  Segment  Right            Left              Impression       Impression       CFV      Normal (Patent)  Normal (Patent)     CONCLUSION:  Impression: RIGHT LOWER LIMB: No evidence of acute or chronic deep vein thrombosis  No evidence of superficial thrombophlebitis noted  Doppler evaluation shows a normal response to augmentation maneuvers  Popliteal, posterior tibial and anterior tibial arterial Doppler waveforms are triphasic  LEFT LOWER LIMB: No evidence of acute or chronic deep vein thrombosis  No evidence of superficial thrombophlebitis noted  Doppler evaluation shows a normal response to augmentation maneuvers  Popliteal, posterior tibial and anterior tibial arterial Doppler waveforms are triphasic  There is a well defined hypoechoic non-vascularized cystic-type structure noted in the popliteal fossa  Technical findings were given to [address] at 1600 on 2/14/20  SIGNATURE: Electronically Signed by: Iris Jarrell on 2020-02-15 10:38:44 AM          ROS:  As mentioned in HPI & Interval History otherwise 14 point ROS negative  Allergies: Allergies   Allergen Reactions    Amoxicillin Rash and Hives    Cardizem [Diltiazem] Rash     Rash      Statins Myalgia     Severe muscle aching  Terrible pains     Current Medications: Reviewed  PMH/FH/SH:  Reviewed      Physical Exam:    There is no height or weight on file to calculate BSA      Ht Readings from Last 3 Encounters:   02/17/20 5' 4" (1 626 m)   02/13/20 5' 4" (1 626 m)   02/07/20 5' 4" (1 626 m)        Wt Readings from Last 3 Encounters:   20 93 3 kg (205 lb 9 6 oz)   20 94 1 kg (207 lb 6 4 oz)   20 94 6 kg (208 lb 8 oz)        Temp Readings from Last 3 Encounters:   20 (!) 97 4 °F (36 3 °C)   20 98 2 °F (36 8 °C) (Temporal)   20 99 °F (37 2 °C) (Oral)        BP Readings from Last 3 Encounters:   20 150/74   20 136/78   20 108/64           Physical Exam   Constitutional: She is oriented to person, place, and time  She appears well-developed and well-nourished  No distress  HENT:   Head: Normocephalic and atraumatic  Eyes: Conjunctivae are normal    Neck: Normal range of motion  Neck supple  No tracheal deviation present  Cardiovascular: Normal rate and regular rhythm  Exam reveals no gallop and no friction rub  No murmur heard  Pulmonary/Chest: Effort normal and breath sounds normal  No respiratory distress  She has no wheezes  She has no rales  She exhibits no tenderness  Abdominal: Soft  She exhibits no distension  There is no tenderness  Musculoskeletal:   Normal ROM left ankle  Trace edema left ankle and foot  No pain to palpation  Lymphadenopathy:     She has no cervical adenopathy  Neurological: She is alert and oriented to person, place, and time  Skin: Skin is warm and dry  She is not diaphoretic  No erythema  No pallor  Psychiatric: She has a normal mood and affect  Her behavior is normal  Judgment and thought content normal    Vitals reviewed        ECO      Emergency Contacts:    Sina Light, ,

## 2020-02-24 ENCOUNTER — OFFICE VISIT (OUTPATIENT)
Dept: HEMATOLOGY ONCOLOGY | Facility: CLINIC | Age: 70
End: 2020-02-24
Payer: MEDICARE

## 2020-02-24 ENCOUNTER — APPOINTMENT (OUTPATIENT)
Dept: LAB | Facility: CLINIC | Age: 70
End: 2020-02-24
Payer: MEDICARE

## 2020-02-24 ENCOUNTER — TELEPHONE (OUTPATIENT)
Dept: HEMATOLOGY ONCOLOGY | Facility: CLINIC | Age: 70
End: 2020-02-24

## 2020-02-24 VITALS
OXYGEN SATURATION: 97 % | WEIGHT: 198 LBS | DIASTOLIC BLOOD PRESSURE: 64 MMHG | RESPIRATION RATE: 14 BRPM | TEMPERATURE: 97.5 F | BODY MASS INDEX: 33.8 KG/M2 | HEIGHT: 64 IN | SYSTOLIC BLOOD PRESSURE: 122 MMHG | HEART RATE: 75 BPM

## 2020-02-24 DIAGNOSIS — C34.92 ADENOCARCINOMA OF LEFT LUNG, STAGE 4 (HCC): ICD-10-CM

## 2020-02-24 DIAGNOSIS — E87.6 HYPOKALEMIA: ICD-10-CM

## 2020-02-24 DIAGNOSIS — K21.9 GERD WITHOUT ESOPHAGITIS: ICD-10-CM

## 2020-02-24 DIAGNOSIS — R22.42 LOCALIZED SWELLING OF LEFT FOOT: ICD-10-CM

## 2020-02-24 DIAGNOSIS — E11.9 TYPE 2 DIABETES MELLITUS WITHOUT COMPLICATION, WITHOUT LONG-TERM CURRENT USE OF INSULIN (HCC): ICD-10-CM

## 2020-02-24 DIAGNOSIS — E11.9 CONTROLLED TYPE 2 DIABETES MELLITUS WITHOUT COMPLICATION, WITHOUT LONG-TERM CURRENT USE OF INSULIN (HCC): ICD-10-CM

## 2020-02-24 DIAGNOSIS — I48.91 ATRIAL FIBRILLATION, UNSPECIFIED TYPE (HCC): ICD-10-CM

## 2020-02-24 DIAGNOSIS — J18.9 PNEUMONITIS: ICD-10-CM

## 2020-02-24 DIAGNOSIS — C34.91 MALIGNANT NEOPLASM OF UNSPECIFIED PART OF RIGHT BRONCHUS OR LUNG (HCC): Primary | ICD-10-CM

## 2020-02-24 PROBLEM — J98.4 PNEUMONITIS: Status: ACTIVE | Noted: 2020-02-24

## 2020-02-24 LAB
ALBUMIN SERPL BCP-MCNC: 3.4 G/DL (ref 3.5–5)
ALP SERPL-CCNC: 95 U/L (ref 46–116)
ALT SERPL W P-5'-P-CCNC: 57 U/L (ref 12–78)
ANION GAP SERPL CALCULATED.3IONS-SCNC: 12 MMOL/L (ref 4–13)
AST SERPL W P-5'-P-CCNC: 41 U/L (ref 5–45)
BASOPHILS # BLD AUTO: 0.01 THOUSANDS/ΜL (ref 0–0.1)
BASOPHILS NFR BLD AUTO: 0 % (ref 0–1)
BILIRUB SERPL-MCNC: 0.44 MG/DL (ref 0.2–1)
BUN SERPL-MCNC: 33 MG/DL (ref 5–25)
CALCIUM SERPL-MCNC: 9.4 MG/DL (ref 8.3–10.1)
CHLORIDE SERPL-SCNC: 101 MMOL/L (ref 100–108)
CO2 SERPL-SCNC: 24 MMOL/L (ref 21–32)
CREAT SERPL-MCNC: 2.16 MG/DL (ref 0.6–1.3)
EOSINOPHIL # BLD AUTO: 0.04 THOUSAND/ΜL (ref 0–0.61)
EOSINOPHIL NFR BLD AUTO: 1 % (ref 0–6)
ERYTHROCYTE [DISTWIDTH] IN BLOOD BY AUTOMATED COUNT: 15.9 % (ref 11.6–15.1)
GFR SERPL CREATININE-BSD FRML MDRD: 22 ML/MIN/1.73SQ M
GLUCOSE SERPL-MCNC: 197 MG/DL (ref 65–140)
HCT VFR BLD AUTO: 38 % (ref 34.8–46.1)
HGB BLD-MCNC: 12.4 G/DL (ref 11.5–15.4)
IMM GRANULOCYTES # BLD AUTO: 0.06 THOUSAND/UL (ref 0–0.2)
IMM GRANULOCYTES NFR BLD AUTO: 1 % (ref 0–2)
LYMPHOCYTES # BLD AUTO: 1.08 THOUSANDS/ΜL (ref 0.6–4.47)
LYMPHOCYTES NFR BLD AUTO: 14 % (ref 14–44)
MCH RBC QN AUTO: 32 PG (ref 26.8–34.3)
MCHC RBC AUTO-ENTMCNC: 32.6 G/DL (ref 31.4–37.4)
MCV RBC AUTO: 98 FL (ref 82–98)
MONOCYTES # BLD AUTO: 0.93 THOUSAND/ΜL (ref 0.17–1.22)
MONOCYTES NFR BLD AUTO: 12 % (ref 4–12)
NEUTROPHILS # BLD AUTO: 5.57 THOUSANDS/ΜL (ref 1.85–7.62)
NEUTS SEG NFR BLD AUTO: 72 % (ref 43–75)
NRBC BLD AUTO-RTO: 0 /100 WBCS
PLATELET # BLD AUTO: 394 THOUSANDS/UL (ref 149–390)
PMV BLD AUTO: 10.8 FL (ref 8.9–12.7)
POTASSIUM SERPL-SCNC: 4.4 MMOL/L (ref 3.5–5.3)
PROT SERPL-MCNC: 7.5 G/DL (ref 6.4–8.2)
RBC # BLD AUTO: 3.88 MILLION/UL (ref 3.81–5.12)
SODIUM SERPL-SCNC: 137 MMOL/L (ref 136–145)
WBC # BLD AUTO: 7.69 THOUSAND/UL (ref 4.31–10.16)

## 2020-02-24 PROCEDURE — 3078F DIAST BP <80 MM HG: CPT | Performed by: PHYSICIAN ASSISTANT

## 2020-02-24 PROCEDURE — 85025 COMPLETE CBC W/AUTO DIFF WBC: CPT

## 2020-02-24 PROCEDURE — 4040F PNEUMOC VAC/ADMIN/RCVD: CPT | Performed by: PHYSICIAN ASSISTANT

## 2020-02-24 PROCEDURE — 36415 COLL VENOUS BLD VENIPUNCTURE: CPT

## 2020-02-24 PROCEDURE — 1036F TOBACCO NON-USER: CPT | Performed by: PHYSICIAN ASSISTANT

## 2020-02-24 PROCEDURE — 3051F HG A1C>EQUAL 7.0%<8.0%: CPT | Performed by: PHYSICIAN ASSISTANT

## 2020-02-24 PROCEDURE — 99214 OFFICE O/P EST MOD 30 MIN: CPT | Performed by: PHYSICIAN ASSISTANT

## 2020-02-24 PROCEDURE — 80053 COMPREHEN METABOLIC PANEL: CPT

## 2020-02-24 PROCEDURE — 3074F SYST BP LT 130 MM HG: CPT | Performed by: PHYSICIAN ASSISTANT

## 2020-02-24 PROCEDURE — 1160F RVW MEDS BY RX/DR IN RCRD: CPT | Performed by: PHYSICIAN ASSISTANT

## 2020-02-24 PROCEDURE — 3008F BODY MASS INDEX DOCD: CPT | Performed by: PHYSICIAN ASSISTANT

## 2020-02-24 PROCEDURE — 2022F DILAT RTA XM EVC RTNOPTHY: CPT | Performed by: PHYSICIAN ASSISTANT

## 2020-02-24 RX ORDER — OMEPRAZOLE 20 MG/1
20 CAPSULE, DELAYED RELEASE ORAL DAILY
Qty: 90 CAPSULE | Refills: 3 | Status: SHIPPED | OUTPATIENT
Start: 2020-02-24 | End: 2020-07-15 | Stop reason: SDUPTHER

## 2020-02-24 RX ORDER — POTASSIUM CHLORIDE 20 MEQ/1
40 TABLET, EXTENDED RELEASE ORAL DAILY
Qty: 180 TABLET | Refills: 3 | Status: SHIPPED | OUTPATIENT
Start: 2020-02-24 | End: 2020-07-07 | Stop reason: SDUPTHER

## 2020-02-24 RX ORDER — DEXAMETHASONE 4 MG/1
TABLET ORAL
Qty: 90 TABLET | Refills: 1 | Status: SHIPPED | OUTPATIENT
Start: 2020-02-24 | End: 2020-09-25

## 2020-02-24 RX ORDER — FUROSEMIDE 40 MG/1
40 TABLET ORAL DAILY
Qty: 90 TABLET | Refills: 3 | Status: SHIPPED | OUTPATIENT
Start: 2020-02-24 | End: 2020-07-07 | Stop reason: SDUPTHER

## 2020-02-24 RX ORDER — SOTALOL HYDROCHLORIDE 80 MG/1
TABLET ORAL
Qty: 180 TABLET | Refills: 3 | Status: SHIPPED | OUTPATIENT
Start: 2020-02-24 | End: 2020-02-25 | Stop reason: SDUPTHER

## 2020-02-24 NOTE — TELEPHONE ENCOUNTER
PT IS TAKING potassium chloride (K-DUR,KLOR-CON) 20 mEq tablet AND SHE JUST GOT A REFILL FOR THE ABOVE BUT FOR 1 A DAY AND PT STATES THAT SHE WAS TAKING 2 A DAY  PLS ADVISE    ALSO PATIENT HASA NEW PHARMACY MAIL ORDER CALLED [facility] AND SAID WE HAVE TO CALL IN ALL HER CURRENT MEDS SHE IS TAKING FOR 90 DAYS  PHONE NUMBER [phone]

## 2020-02-24 NOTE — TELEPHONE ENCOUNTER
Refill request sent to Dr Ramachandran Rounds with meds she prescribes with new mail order [id number]

## 2020-02-25 DIAGNOSIS — I48.91 ATRIAL FIBRILLATION, UNSPECIFIED TYPE (HCC): ICD-10-CM

## 2020-02-25 RX ORDER — SOTALOL HYDROCHLORIDE 80 MG/1
80 TABLET ORAL 2 TIMES DAILY
Qty: 180 TABLET | Refills: 3 | Status: SHIPPED | OUTPATIENT
Start: 2020-02-25 | End: 2021-03-15

## 2020-02-26 ENCOUNTER — TELEPHONE (OUTPATIENT)
Dept: HEMATOLOGY ONCOLOGY | Facility: CLINIC | Age: 70
End: 2020-02-26

## 2020-02-26 ENCOUNTER — OFFICE VISIT (OUTPATIENT)
Dept: FAMILY MEDICINE CLINIC | Facility: CLINIC | Age: 70
End: 2020-02-26
Payer: MEDICARE

## 2020-02-26 VITALS
TEMPERATURE: 96.1 F | RESPIRATION RATE: 16 BRPM | SYSTOLIC BLOOD PRESSURE: 118 MMHG | OXYGEN SATURATION: 100 % | DIASTOLIC BLOOD PRESSURE: 68 MMHG | HEIGHT: 64 IN | HEART RATE: 79 BPM | BODY MASS INDEX: 33.57 KG/M2 | WEIGHT: 196.6 LBS

## 2020-02-26 DIAGNOSIS — I50.32 CHRONIC DIASTOLIC HEART FAILURE (HCC): ICD-10-CM

## 2020-02-26 DIAGNOSIS — I50.30 HYPERTENSIVE HEART DISEASE WITH DIASTOLIC CONGESTIVE HEART FAILURE, UNSPECIFIED HF CHRONICITY (HCC): ICD-10-CM

## 2020-02-26 DIAGNOSIS — N17.9 AKI (ACUTE KIDNEY INJURY) (HCC): ICD-10-CM

## 2020-02-26 DIAGNOSIS — I11.0 HYPERTENSIVE HEART DISEASE WITH DIASTOLIC CONGESTIVE HEART FAILURE, UNSPECIFIED HF CHRONICITY (HCC): ICD-10-CM

## 2020-02-26 DIAGNOSIS — R79.89 ELEVATED SERUM CREATININE: Primary | ICD-10-CM

## 2020-02-26 DIAGNOSIS — R22.42 LOCALIZED SWELLING OF LEFT FOOT: Primary | ICD-10-CM

## 2020-02-26 PROCEDURE — 3008F BODY MASS INDEX DOCD: CPT | Performed by: FAMILY MEDICINE

## 2020-02-26 PROCEDURE — 2022F DILAT RTA XM EVC RTNOPTHY: CPT | Performed by: FAMILY MEDICINE

## 2020-02-26 PROCEDURE — 3066F NEPHROPATHY DOC TX: CPT | Performed by: FAMILY MEDICINE

## 2020-02-26 PROCEDURE — 3074F SYST BP LT 130 MM HG: CPT | Performed by: FAMILY MEDICINE

## 2020-02-26 PROCEDURE — 3078F DIAST BP <80 MM HG: CPT | Performed by: FAMILY MEDICINE

## 2020-02-26 PROCEDURE — 1160F RVW MEDS BY RX/DR IN RCRD: CPT | Performed by: FAMILY MEDICINE

## 2020-02-26 PROCEDURE — 3051F HG A1C>EQUAL 7.0%<8.0%: CPT | Performed by: FAMILY MEDICINE

## 2020-02-26 PROCEDURE — 99214 OFFICE O/P EST MOD 30 MIN: CPT | Performed by: FAMILY MEDICINE

## 2020-02-26 PROCEDURE — 1036F TOBACCO NON-USER: CPT | Performed by: FAMILY MEDICINE

## 2020-02-26 PROCEDURE — 4040F PNEUMOC VAC/ADMIN/RCVD: CPT | Performed by: FAMILY MEDICINE

## 2020-02-26 NOTE — TELEPHONE ENCOUNTER
Patient called back and I instructed her to hold the lasix and recheck a bmp on Monday 3/2  Patient will need a renal US and she is aware  Please schedule at [place]

## 2020-02-26 NOTE — TELEPHONE ENCOUNTER
Patient's cr elevated further to 2 15  Will hold chemotherapy this week  Recheck bmp next week  Renal u/s  Left message to return call to discuss/ arrange above

## 2020-02-26 NOTE — ASSESSMENT & PLAN NOTE
Wt Readings from Last 3 Encounters:   02/26/20 89 2 kg (196 lb 9 6 oz)   02/24/20 89 8 kg (198 lb)   02/17/20 93 3 kg (205 lb 9 6 oz)     bp stable right now

## 2020-02-26 NOTE — ASSESSMENT & PLAN NOTE
Wt Readings from Last 3 Encounters:   02/26/20 89 2 kg (196 lb 9 6 oz)   02/24/20 89 8 kg (198 lb)   02/17/20 93 3 kg (205 lb 9 6 oz)       Weight improved with lasix  Will hold it again due to creatinine and see how edema is

## 2020-02-26 NOTE — PROGRESS NOTES
Assessment/Plan:    1  Localized swelling of left foot  Assessment & Plan:  Improved with lasix      2  ADITYA (acute kidney injury) ([facility] [address] )  Assessment & Plan:  Creatinine up to 2 1  Possible chemo related but also lasix may have added  Stop lasix      3  Chronic diastolic heart failure (HCC)  Assessment & Plan:  Wt Readings from Last 3 Encounters:   02/26/20 89 2 kg (196 lb 9 6 oz)   02/24/20 89 8 kg (198 lb)   02/17/20 93 3 kg (205 lb 9 6 oz)       Weight improved with lasix  Will hold it again due to creatinine and see how edema is        4  Hypertensive heart disease with diastolic congestive heart failure, unspecified HF chronicity ([facility] [address] )  Assessment & Plan:  Wt Readings from Last 3 Encounters:   02/26/20 89 2 kg (196 lb 9 6 oz)   02/24/20 89 8 kg (198 lb)   02/17/20 93 3 kg (205 lb 9 6 oz)     bp stable right now              There are no Patient Instructions on file for this visit  No follow-ups on file  Subjective:      Patient ID: Barbara Auguste is a 68 y o  female  Chief Complaint   Patient presents with    Follow-up     1 week f/u       Here for follow up of right foot, improved with lasix, however kidney function declined  Has to postpone chemo due to kidney function  Kidney US is scheduled    Leg Pain    The incident occurred more than 1 week ago  The injury mechanism was a fall  The pain is present in the left ankle  The pain is at a severity of 0/10  The patient is experiencing no pain  The pain has been constant since onset  She reports no foreign bodies present  Nothing aggravates the symptoms  Treatments tried: lasix         The following portions of the patient's history were reviewed and updated as appropriate: allergies, current medications, past family history, past medical history, past social history, past surgical history and problem list     Review of Systems      Current Outpatient Medications   Medication Sig Dispense Refill    acetaminophen (TYLENOL) 325 mg tablet Take 650 mg by mouth every 6 (six) hours as needed for mild pain      apixaban (Eliquis) 5 mg TAKE 1 TABLET TWICE A  tablet 3    Calcium Carb-Cholecalciferol 600-800 MG-UNIT TABS Take 1 tablet by mouth 2 (two) times a day      Cholecalciferol (VITAMIN D) 2000 units CAPS Take by mouth      cyanocobalamin (VITAMIN B-12) 100 mcg tablet Take by mouth daily      dexamethasone (DECADRON) 4 mg tablet TAKE ONE TAB TWICE A DAY WITH FOOD, DAY BEFORE,DAY OF,DAY AFTER CHEMO 90 tablet 1    Empagliflozin 10 MG TABS Take 1 tablet (10 mg total) by mouth every morning 90 tablet 3    fluconazole (DIFLUCAN) 150 mg tablet Take 1 tablet (150 mg total) by mouth once a week for 52 doses 52 tablet 0    Folic Acid 0 8 MG CAPS       furosemide (LASIX) 40 mg tablet Take 1 tablet (40 mg total) by mouth daily 90 tablet 3    linaGLIPtin (Tradjenta) 5 MG TABS Take 5 mg by mouth daily 90 tablet 3    lisinopril (ZESTRIL) 10 mg tablet Take 1 tablet (10 mg total) by mouth daily 90 tablet 3    metFORMIN (GLUCOPHAGE) 1000 MG tablet TAKE 1 TABLET TWICE DAILY  WITH MEALS 180 tablet 3    metoprolol tartrate (LOPRESSOR) 25 mg tablet Take 1 tablet (25 mg total) by mouth every 12 (twelve) hours 180 tablet 3    nystatin (MYCOSTATIN) cream Apply topically 2 (two) times a day 30 g 5    omeprazole (PriLOSEC) 20 mg delayed release capsule Take 1 capsule (20 mg total) by mouth daily 90 capsule 3    potassium chloride (K-DUR,KLOR-CON) 20 mEq tablet Take 2 tablets (40 mEq total) by mouth daily 180 tablet 3    sotalol (BETAPACE) 80 mg tablet Take 1 tablet (80 mg total) by mouth 2 (two) times a day 1 tab PO  tablet 3    venlafaxine (EFFEXOR) 37 5 mg tablet Take 1 tablet (37 5 mg total) by mouth 2 (two) times a day 180 tablet 1     No current facility-administered medications for this visit          Objective:    /68 (BP Location: Left arm, Patient Position: Sitting, Cuff Size: Standard)   Pulse 79   Temp (!) 96 1 °F (35 6 °C) (Tympanic)   Resp 16   Ht 5' 4" (1 626 m)   Wt 89 2 kg (196 lb 9 6 oz)   LMP  (LMP Unknown)   SpO2 100%   BMI 33 75 kg/m²        Physical Exam   Constitutional: She appears well-developed and well-nourished  HENT:   Head: Normocephalic and atraumatic  Eyes: Pupils are equal, round, and reactive to light  Neck: Normal range of motion  Neck supple  Cardiovascular: Normal rate, regular rhythm, normal heart sounds and intact distal pulses  Pulmonary/Chest: Effort normal and breath sounds normal    Abdominal: Soft  Bowel sounds are normal    Musculoskeletal: She exhibits edema (mild, much improved)  Neurological: She is alert  Skin: Skin is warm  Capillary refill takes less than 2 seconds  Psychiatric: She has a normal mood and affect  Nursing note and vitals reviewed               Rodrigo Rivear DO

## 2020-03-02 ENCOUNTER — APPOINTMENT (OUTPATIENT)
Dept: LAB | Facility: CLINIC | Age: 70
End: 2020-03-02
Payer: MEDICARE

## 2020-03-02 ENCOUNTER — TRANSCRIBE ORDERS (OUTPATIENT)
Dept: LAB | Facility: CLINIC | Age: 70
End: 2020-03-02

## 2020-03-02 DIAGNOSIS — R79.89 ELEVATED SERUM CREATININE: ICD-10-CM

## 2020-03-02 LAB
ANION GAP SERPL CALCULATED.3IONS-SCNC: 9 MMOL/L (ref 4–13)
BUN SERPL-MCNC: 24 MG/DL (ref 5–25)
CALCIUM SERPL-MCNC: 9.2 MG/DL (ref 8.3–10.1)
CHLORIDE SERPL-SCNC: 106 MMOL/L (ref 100–108)
CO2 SERPL-SCNC: 27 MMOL/L (ref 21–32)
CREAT SERPL-MCNC: 1.59 MG/DL (ref 0.6–1.3)
GFR SERPL CREATININE-BSD FRML MDRD: 32 ML/MIN/1.73SQ M
GLUCOSE P FAST SERPL-MCNC: 150 MG/DL (ref 65–99)
POTASSIUM SERPL-SCNC: 4.3 MMOL/L (ref 3.5–5.3)
SODIUM SERPL-SCNC: 142 MMOL/L (ref 136–145)

## 2020-03-02 PROCEDURE — 80048 BASIC METABOLIC PNL TOTAL CA: CPT

## 2020-03-02 PROCEDURE — 36415 COLL VENOUS BLD VENIPUNCTURE: CPT

## 2020-03-03 ENCOUNTER — TELEPHONE (OUTPATIENT)
Dept: INFUSION CENTER | Facility: HOSPITAL | Age: 70
End: 2020-03-03

## 2020-03-04 ENCOUNTER — HOSPITAL ENCOUNTER (OUTPATIENT)
Dept: ULTRASOUND IMAGING | Facility: HOSPITAL | Age: 70
Discharge: HOME | End: 2020-03-04
Payer: MEDICARE

## 2020-03-04 DIAGNOSIS — R79.89 ELEVATED SERUM CREATININE: ICD-10-CM

## 2020-03-04 PROCEDURE — 76770 US EXAM ABDO BACK WALL COMP: CPT

## 2020-03-05 ENCOUNTER — TELEPHONE (OUTPATIENT)
Dept: HEMATOLOGY ONCOLOGY | Facility: CLINIC | Age: 70
End: 2020-03-05

## 2020-03-05 NOTE — TELEPHONE ENCOUNTER
----- Message from Carey Henry PA-C sent at 3/5/2020  8:41 AM EST -----  Reviewed with Dr Jose Carlos Zheng  Cr improved back to 1 5 but still higher than her baseline  Recommend holding Alimta March and April  Keep appointment for CT scan and f/u with Dr Jose Carlos Zheng  Please notify patient and infusion center  Called patient and informed her that we are holding treatment in March and April due to her elevated creatinine  She mentioned that since the prednisone was stopped she is having some pain in her scapula area like she had previously  Instructed her to use a heating pad and if this continues or gets worse to call the office  Dr Jose Carlos Zheng may want to move up the CT scan if the pain continues  Patient verbalized understanding

## 2020-03-09 ENCOUNTER — EVALUATION (OUTPATIENT)
Dept: PHYSICAL THERAPY | Facility: CLINIC | Age: 70
End: 2020-03-09
Payer: MEDICARE

## 2020-03-09 VITALS — SYSTOLIC BLOOD PRESSURE: 144 MMHG | DIASTOLIC BLOOD PRESSURE: 86 MMHG | HEART RATE: 62 BPM

## 2020-03-09 DIAGNOSIS — J18.9 PNEUMONITIS: ICD-10-CM

## 2020-03-09 DIAGNOSIS — C34.92 ADENOCARCINOMA OF LEFT LUNG, STAGE 4 (HCC): ICD-10-CM

## 2020-03-09 DIAGNOSIS — R26.9 GAIT ABNORMALITY: Primary | ICD-10-CM

## 2020-03-09 PROCEDURE — 97110 THERAPEUTIC EXERCISES: CPT | Performed by: PHYSICAL THERAPIST

## 2020-03-09 PROCEDURE — 97162 PT EVAL MOD COMPLEX 30 MIN: CPT | Performed by: PHYSICAL THERAPIST

## 2020-03-09 NOTE — PROGRESS NOTES
PT Evaluation     Today's date: 3/9/2020  Patient name: Yvonne Powers  : 1950  MRN: [id number]  Referring provider: Christie Corrales PA-C  Dx:   Encounter Diagnosis     ICD-10-CM    1  Gait abnormality R26 9    2  Adenocarcinoma of left lung, stage 4 (HCC) C34 92 Ambulatory referral to Physical Therapy   3  Pneumonitis J18 9 Ambulatory referral to Physical Therapy                  Assessment  Assessment details: Pt presents with signs and symptoms synonymous of deconditioning as well as min to mod risk for falls given subjective and objective findings  Pt presents with pain, decreased strength, decreased endurance, as well as tolerance to activity and being a fall risk as per prior mentioned information  Pt would benefit skilled PT intervention in order to address these impairments in order to be able to perform all desired activities with minimal to nil symptom exacerbation    Thank you very much for this referral      Impairments: abnormal gait, abnormal or restricted ROM, activity intolerance, impaired balance, impaired physical strength, lacks appropriate home exercise program, pain with function and poor posture   Understanding of Dx/Px/POC: good   Prognosis: good    Goals  STG 4 Weeks:  Improve endurance to 20 mins walking  Improve strength to Hip to 4-/5, knee 5/5  Independent with HEP  LTG 8 Weeks:  Improve endurance up to 30 mins walking  Improve strength to 4/5 hip  Able to perform all desired activities with minimal to nil symptom exacerbation      Plan  Patient would benefit from: skilled physical therapy  Planned modality interventions: TENS, thermotherapy: hydrocollator packs and cryotherapy  Planned therapy interventions: joint mobilization, manual therapy, abdominal trunk stabilization, patient education, postural training, balance, flexibility, functional ROM exercises, gait training, home exercise program, graded motor, graded exercise, graded activity, strengthening, stretching, therapeutic activities, therapeutic exercise, therapeutic training, transfer training and neuromuscular re-education  Frequency: 2x week  Duration in weeks: 10  Treatment plan discussed with: patient        Subjective Evaluation    History of Present Illness  Date of onset: 3/9/2020  Mechanism of injury: Pt is a 68 yofemale who presents today with a PMH of DM2, A-Fib, CHF, Stage 4 Lung CA with T10 and Scapular bone Mets who recently fell a few months ago without injury  Pt reports that she is taking a break chemo and radiation which she has been on since 2016 due to her Lung CA  She states that now she feels as though she is ready to be able to participate in PT for endurance training to be able to keep up and excel with her planned trip to [facility]  She lives with her brother who has Limb Girdle muscle dystrophy so he requires transferring ability which is 100% on her unless she has a kelley lift at this time  She has 1 child local and available, and 1 that is approximately 1-2 hours away  Pt lives in a split level home that has an elevator to get into the kitchen, and she is in the 2nd and 3rd floor and she is doing steps unless she has a lot of things to take, then she uses the elevator  Pt reports her walking tolerance is maybe between 10-15 mins but she states that isn't completely sure at this time time  Pt reports that otherwise she states that endurance is her largest goal at this time for her vacation and to keep on taking care of her brother as she is primary care taker     Pain  No pain reported    Social Support  Steps to enter house: yes  Stairs in house: yes   Lives with: Brother     Patient Goals  Patient goals for therapy: improved balance, increased motion, increased strength and return to sport/leisure activities (Endurance )          Objective     Ambulation     Comments   Sensation intact to light touch L3,4,5,S1,S2  Genu valgus L > R  SLS L 7" R 5"  Hip Strength  L Flex 4- Ext 5 Abd 3+ Add 4-  R Flex 3+ Ext 5 Abd 3+  Add 4-  LE screen L Knee Flex 3+ ext 5, R Knee flex 4- Ext 5  STs:   Tansfers Supine <> sit, sit <> Stand, Rolling   (independent)  TUG:10 32"  Sit to stand x 5: 16 3"             Precautions: Fall Risk, DM2, A-Fib, CHF, Stage 4 Lung CA T10 + Scapular Radiation, Hx of Skin CA       Daily Treatment Diary       Manual 3/9                                                                             Exercise Diary             TM start             Bike Conclude             HR/TR 2 x 10            Bridges 2 x 5            SLR Flex 2 x 10            SLR abd             Sit to Stand             SLS UE support prn             Side Steps             AP PA Walking             NBOS EC FOAM             Carioca's             Cone Walk and weaving             FF                                                                                                        Modalities

## 2020-03-11 ENCOUNTER — OFFICE VISIT (OUTPATIENT)
Dept: PHYSICAL THERAPY | Facility: CLINIC | Age: 70
End: 2020-03-11
Payer: MEDICARE

## 2020-03-11 DIAGNOSIS — R26.9 GAIT ABNORMALITY: Primary | ICD-10-CM

## 2020-03-11 PROCEDURE — 97112 NEUROMUSCULAR REEDUCATION: CPT

## 2020-03-11 PROCEDURE — 97110 THERAPEUTIC EXERCISES: CPT

## 2020-03-11 NOTE — PROGRESS NOTES
Daily Note     Today's date: 3/11/2020  Patient name: Juan Jose  : 1950  MRN: [id number]  Referring provider: Toña Miramontes PA-C  Dx:   Encounter Diagnosis     ICD-10-CM    1  Gait abnormality R26 9    2  Adenocarcinoma of left lung, stage 4 (HCC) C34 92                   Subjective: Patient states that she has not been compliant with HEP  States that she has not had any recent falls but remains limited with her tolerance to walking    Objective: See treatment diary below      Assessment: Tolerated treatment fair  Patient exhibited good technique with therapeutic exercises      Plan: Continue per plan of care  Precautions: Fall Risk, DM2, A-Fib, CHF, Stage 4 Lung CA T10 + Scapular Radiation, Hx of Skin CA       Daily Treatment Diary       Manual 3/9 3/11                                                                            Exercise Diary             TM start/end  5 min x 2            Bike Conclude  knee pain            HR/TR 2 x 10 2 x 10            Bridges 2 x 5 2 x 5            SLR Flex 2 x 10 2 x 10            SLR abd  L only x 10            Sit to Stand  X 10            SLS UE support prn  :05 x 10            Side Steps  foam 5 laps           AP PA Walking  5 laps            NBOS EC FOAM  :30 x 3           Carioca's             Cone Walk and weaving             FF                                                                                                        Modalities

## 2020-03-18 ENCOUNTER — APPOINTMENT (OUTPATIENT)
Dept: PHYSICAL THERAPY | Facility: CLINIC | Age: 70
End: 2020-03-18
Payer: MEDICARE

## 2020-03-19 ENCOUNTER — APPOINTMENT (OUTPATIENT)
Dept: PHYSICAL THERAPY | Facility: CLINIC | Age: 70
End: 2020-03-19
Payer: MEDICARE

## 2020-03-23 ENCOUNTER — APPOINTMENT (OUTPATIENT)
Dept: PHYSICAL THERAPY | Facility: CLINIC | Age: 70
End: 2020-03-23
Payer: MEDICARE

## 2020-03-26 ENCOUNTER — APPOINTMENT (OUTPATIENT)
Dept: PHYSICAL THERAPY | Facility: CLINIC | Age: 70
End: 2020-03-26
Payer: MEDICARE

## 2020-03-27 ENCOUNTER — TELEPHONE (OUTPATIENT)
Dept: FAMILY MEDICINE CLINIC | Facility: CLINIC | Age: 70
End: 2020-03-27

## 2020-03-27 NOTE — TELEPHONE ENCOUNTER
Patient is on Empagliflozin 10 MG TABS but believes it's causing reoccurring yeast infections  Patient's ObGyn prescribed Metronibazole and it helps yeast infection but then it returns again  She also prescribed fluconazole (DIFLUCAN) 150 mg tablet        Please advise

## 2020-03-27 NOTE — ED NOTES
Patient transported to [address] NURIA Rogers RN  07/06/18 7499
Portable x-ray at bedside     Nathaniel Youssef RN  07/06/18 1796
Quality 431: Preventive Care And Screening: Unhealthy Alcohol Use - Screening: Patient screened for unhealthy alcohol use using a single question and scores less than 2 times per year
Quality 226: Preventive Care And Screening: Tobacco Use: Screening And Cessation Intervention: Patient screened for tobacco use and is an ex/non-smoker
Quality 110: Preventive Care And Screening: Influenza Immunization: Influenza Immunization Administered during Influenza season
Detail Level: Detailed

## 2020-03-27 NOTE — TELEPHONE ENCOUNTER
It is true, that that med can cause issues with yeast infections - seeing that she is Dr Safia Ba patient, you should have her discuss any potential med adjustments with Dr Yeni Lowery  A med for the current yeast infection can always be prescribed  Thanks    Niko

## 2020-03-27 NOTE — TELEPHONE ENCOUNTER
Patient is on Empagliflozin 10 MG TABS but believes it's causing reoccurring yeast infections       Patient's ObGyn prescribed Metronibazole and it helps yeast infection but then it returns again   She also prescribed fluconazole (DIFLUCAN) 150 mg tablet

## 2020-03-29 DIAGNOSIS — N76.0 BACTERIAL VAGINOSIS: ICD-10-CM

## 2020-03-29 DIAGNOSIS — B96.89 BACTERIAL VAGINOSIS: ICD-10-CM

## 2020-03-29 NOTE — TELEPHONE ENCOUNTER
At this point will probably need to take her off it  Does she want to give me a list of diabetic meds that her insurance will cover? Is she willing to do an injectable like victoza?

## 2020-03-30 ENCOUNTER — TELEPHONE (OUTPATIENT)
Dept: OBGYN CLINIC | Facility: CLINIC | Age: 70
End: 2020-03-30

## 2020-03-30 ENCOUNTER — APPOINTMENT (OUTPATIENT)
Dept: PHYSICAL THERAPY | Facility: CLINIC | Age: 70
End: 2020-03-30
Payer: MEDICARE

## 2020-03-30 DIAGNOSIS — E11.9 TYPE 2 DIABETES MELLITUS WITHOUT COMPLICATION, WITHOUT LONG-TERM CURRENT USE OF INSULIN (HCC): ICD-10-CM

## 2020-03-30 RX ORDER — METRONIDAZOLE 7.5 MG/G
GEL VAGINAL
Qty: 70 G | Refills: 0 | Status: SHIPPED | OUTPATIENT
Start: 2020-03-30 | End: 2020-05-27 | Stop reason: ALTCHOICE

## 2020-03-30 NOTE — TELEPHONE ENCOUNTER
Spoke to patient she did call her insurance co and the only meds she could do are the [place] and the Victoza $100 for 90 days they have a lot of side effects and one of them is also a yeast infection  So she said at this point she will keep the Jardiance she will need a refill  The PA at her OB/GYN said they can fill the Diflucan for her to take 1 a week for a year

## 2020-03-30 NOTE — TELEPHONE ENCOUNTER
Patient states she is having vaginal irritation white patches again would like to know if she can have refill on Metrogel  Denies any itching  She is taking diflucan weekly but is immunocompromised due to chemo treatments  Pharmacy updated  Routing to provider for advice

## 2020-03-30 NOTE — TELEPHONE ENCOUNTER
Spoke to patient advised of orders  She will check with her insurance to see what is covered by her insurance she is not really not sure about the injectable right now    But she'll check to see if that's covered too

## 2020-04-06 ENCOUNTER — TELEPHONE (OUTPATIENT)
Dept: HEMATOLOGY ONCOLOGY | Facility: MEDICAL CENTER | Age: 70
End: 2020-04-06

## 2020-04-08 NOTE — PROGRESS NOTES
PT Discharge    Today's date: 2020  Patient name: Juan Jose  : 1950  MRN: [id number]  Referring provider: Toña Miramontes PA-C  Dx:   Encounter Diagnosis     ICD-10-CM    1  Gait abnormality R26 9        Start Time: 1100  Stop Time: 1145  Total time in clinic (min): 45 minutes    Assessment/Plan  Pt has not been present since 3/11/2020  Pt's chart will be DC in compliance of facility policy as all Charts are DC within 30 days of last scheduled visit          Subjective    Objective

## 2020-04-14 ENCOUNTER — SOCIAL WORK (OUTPATIENT)
Dept: PALLIATIVE MEDICINE | Facility: CLINIC | Age: 70
End: 2020-04-14
Payer: MEDICARE

## 2020-04-14 ENCOUNTER — TELEMEDICINE (OUTPATIENT)
Dept: PALLIATIVE MEDICINE | Facility: CLINIC | Age: 70
End: 2020-04-14
Payer: MEDICARE

## 2020-04-14 DIAGNOSIS — C34.92 ADENOCARCINOMA OF LEFT LUNG, STAGE 4 (HCC): ICD-10-CM

## 2020-04-14 DIAGNOSIS — F41.9 ANXIETY: ICD-10-CM

## 2020-04-14 DIAGNOSIS — C79.52 SECONDARY MALIGNANT NEOPLASM OF BONE AND BONE MARROW (HCC): ICD-10-CM

## 2020-04-14 DIAGNOSIS — Z71.89 COUNSELING AND COORDINATION OF CARE: Primary | ICD-10-CM

## 2020-04-14 DIAGNOSIS — C41.9 MALIGNANT NEOPLASM OF BONE WITH METASTASES (HCC): Primary | ICD-10-CM

## 2020-04-14 DIAGNOSIS — G89.3 CANCER RELATED PAIN: Chronic | ICD-10-CM

## 2020-04-14 DIAGNOSIS — C79.51 SECONDARY MALIGNANT NEOPLASM OF BONE AND BONE MARROW (HCC): ICD-10-CM

## 2020-04-14 PROCEDURE — 1160F RVW MEDS BY RX/DR IN RCRD: CPT

## 2020-04-14 PROCEDURE — NC001 PR NO CHARGE

## 2020-04-14 PROCEDURE — 4040F PNEUMOC VAC/ADMIN/RCVD: CPT

## 2020-04-14 PROCEDURE — 3066F NEPHROPATHY DOC TX: CPT

## 2020-04-14 PROCEDURE — 99214 OFFICE O/P EST MOD 30 MIN: CPT | Performed by: NURSE PRACTITIONER

## 2020-04-14 PROCEDURE — 3077F SYST BP >= 140 MM HG: CPT

## 2020-04-14 PROCEDURE — 2022F DILAT RTA XM EVC RTNOPTHY: CPT

## 2020-04-14 PROCEDURE — 3079F DIAST BP 80-89 MM HG: CPT

## 2020-04-14 RX ORDER — VENLAFAXINE 37.5 MG/1
37.5 TABLET ORAL DAILY
Qty: 90 TABLET | Refills: 1 | Status: SHIPPED | OUTPATIENT
Start: 2020-04-14 | End: 2020-09-25 | Stop reason: SDUPTHER

## 2020-04-15 ENCOUNTER — TELEPHONE (OUTPATIENT)
Dept: CARDIOLOGY CLINIC | Facility: CLINIC | Age: 70
End: 2020-04-15

## 2020-04-24 ENCOUNTER — HOSPITAL ENCOUNTER (OUTPATIENT)
Dept: CT IMAGING | Facility: HOSPITAL | Age: 70
Discharge: HOME | End: 2020-04-24
Payer: MEDICARE

## 2020-04-24 DIAGNOSIS — C34.91 MALIGNANT NEOPLASM OF UNSPECIFIED PART OF RIGHT BRONCHUS OR LUNG (HCC): ICD-10-CM

## 2020-04-24 PROCEDURE — 71250 CT THORAX DX C-: CPT

## 2020-04-27 DIAGNOSIS — E11.9 TYPE 2 DIABETES MELLITUS WITHOUT COMPLICATION, WITHOUT LONG-TERM CURRENT USE OF INSULIN (HCC): ICD-10-CM

## 2020-04-29 ENCOUNTER — OFFICE VISIT (OUTPATIENT)
Dept: HEMATOLOGY ONCOLOGY | Facility: CLINIC | Age: 70
End: 2020-04-29
Payer: MEDICARE

## 2020-04-29 VITALS
HEART RATE: 70 BPM | DIASTOLIC BLOOD PRESSURE: 70 MMHG | RESPIRATION RATE: 18 BRPM | BODY MASS INDEX: 33.29 KG/M2 | HEIGHT: 64 IN | WEIGHT: 195 LBS | TEMPERATURE: 97.6 F | OXYGEN SATURATION: 97 % | SYSTOLIC BLOOD PRESSURE: 132 MMHG

## 2020-04-29 DIAGNOSIS — C34.91 MALIGNANT NEOPLASM OF UNSPECIFIED PART OF RIGHT BRONCHUS OR LUNG (HCC): Primary | ICD-10-CM

## 2020-04-29 PROCEDURE — 4040F PNEUMOC VAC/ADMIN/RCVD: CPT | Performed by: INTERNAL MEDICINE

## 2020-04-29 PROCEDURE — 99214 OFFICE O/P EST MOD 30 MIN: CPT | Performed by: INTERNAL MEDICINE

## 2020-04-29 PROCEDURE — 3051F HG A1C>EQUAL 7.0%<8.0%: CPT | Performed by: INTERNAL MEDICINE

## 2020-04-29 PROCEDURE — 3078F DIAST BP <80 MM HG: CPT | Performed by: INTERNAL MEDICINE

## 2020-04-29 PROCEDURE — 3008F BODY MASS INDEX DOCD: CPT | Performed by: INTERNAL MEDICINE

## 2020-04-29 PROCEDURE — 2022F DILAT RTA XM EVC RTNOPTHY: CPT | Performed by: INTERNAL MEDICINE

## 2020-04-29 PROCEDURE — 3075F SYST BP GE 130 - 139MM HG: CPT | Performed by: INTERNAL MEDICINE

## 2020-04-29 PROCEDURE — 1036F TOBACCO NON-USER: CPT | Performed by: INTERNAL MEDICINE

## 2020-04-29 PROCEDURE — 3066F NEPHROPATHY DOC TX: CPT | Performed by: INTERNAL MEDICINE

## 2020-04-29 PROCEDURE — 1160F RVW MEDS BY RX/DR IN RCRD: CPT | Performed by: INTERNAL MEDICINE

## 2020-04-30 ENCOUNTER — TELEPHONE (OUTPATIENT)
Dept: FAMILY MEDICINE CLINIC | Facility: CLINIC | Age: 70
End: 2020-04-30

## 2020-04-30 ENCOUNTER — TELEPHONE (OUTPATIENT)
Dept: SURGICAL ONCOLOGY | Facility: CLINIC | Age: 70
End: 2020-04-30

## 2020-04-30 ENCOUNTER — APPOINTMENT (OUTPATIENT)
Dept: LAB | Facility: CLINIC | Age: 70
End: 2020-04-30
Payer: MEDICARE

## 2020-04-30 DIAGNOSIS — C34.92 ADENOCARCINOMA OF LEFT LUNG, STAGE 4 (HCC): ICD-10-CM

## 2020-04-30 DIAGNOSIS — C34.91 MALIGNANT NEOPLASM OF UNSPECIFIED PART OF RIGHT BRONCHUS OR LUNG (HCC): Primary | ICD-10-CM

## 2020-04-30 LAB
ALBUMIN SERPL BCP-MCNC: 3 G/DL (ref 3.5–5)
ALP SERPL-CCNC: 76 U/L (ref 46–116)
ALT SERPL W P-5'-P-CCNC: 15 U/L (ref 12–78)
ANION GAP SERPL CALCULATED.3IONS-SCNC: 11 MMOL/L (ref 4–13)
AST SERPL W P-5'-P-CCNC: 20 U/L (ref 5–45)
BASOPHILS # BLD AUTO: 0.04 THOUSANDS/ΜL (ref 0–0.1)
BASOPHILS NFR BLD AUTO: 1 % (ref 0–1)
BILIRUB SERPL-MCNC: 0.3 MG/DL (ref 0.2–1)
BUN SERPL-MCNC: 34 MG/DL (ref 5–25)
CALCIUM SERPL-MCNC: 9.4 MG/DL (ref 8.3–10.1)
CHLORIDE SERPL-SCNC: 105 MMOL/L (ref 100–108)
CO2 SERPL-SCNC: 26 MMOL/L (ref 21–32)
CREAT SERPL-MCNC: 1.64 MG/DL (ref 0.6–1.3)
EOSINOPHIL # BLD AUTO: 0.18 THOUSAND/ΜL (ref 0–0.61)
EOSINOPHIL NFR BLD AUTO: 3 % (ref 0–6)
ERYTHROCYTE [DISTWIDTH] IN BLOOD BY AUTOMATED COUNT: 12.4 % (ref 11.6–15.1)
GFR SERPL CREATININE-BSD FRML MDRD: 31 ML/MIN/1.73SQ M
GLUCOSE P FAST SERPL-MCNC: 146 MG/DL (ref 65–99)
HCT VFR BLD AUTO: 38.5 % (ref 34.8–46.1)
HGB BLD-MCNC: 12.3 G/DL (ref 11.5–15.4)
IMM GRANULOCYTES # BLD AUTO: 0.03 THOUSAND/UL (ref 0–0.2)
IMM GRANULOCYTES NFR BLD AUTO: 1 % (ref 0–2)
LYMPHOCYTES # BLD AUTO: 1.06 THOUSANDS/ΜL (ref 0.6–4.47)
LYMPHOCYTES NFR BLD AUTO: 20 % (ref 14–44)
MCH RBC QN AUTO: 30.1 PG (ref 26.8–34.3)
MCHC RBC AUTO-ENTMCNC: 31.9 G/DL (ref 31.4–37.4)
MCV RBC AUTO: 94 FL (ref 82–98)
MONOCYTES # BLD AUTO: 0.43 THOUSAND/ΜL (ref 0.17–1.22)
MONOCYTES NFR BLD AUTO: 8 % (ref 4–12)
NEUTROPHILS # BLD AUTO: 3.58 THOUSANDS/ΜL (ref 1.85–7.62)
NEUTS SEG NFR BLD AUTO: 67 % (ref 43–75)
NRBC BLD AUTO-RTO: 0 /100 WBCS
PLATELET # BLD AUTO: 219 THOUSANDS/UL (ref 149–390)
PMV BLD AUTO: 11.1 FL (ref 8.9–12.7)
POTASSIUM SERPL-SCNC: 4.3 MMOL/L (ref 3.5–5.3)
PROT SERPL-MCNC: 6.7 G/DL (ref 6.4–8.2)
RBC # BLD AUTO: 4.09 MILLION/UL (ref 3.81–5.12)
SODIUM SERPL-SCNC: 142 MMOL/L (ref 136–145)
WBC # BLD AUTO: 5.32 THOUSAND/UL (ref 4.31–10.16)

## 2020-04-30 PROCEDURE — 36415 COLL VENOUS BLD VENIPUNCTURE: CPT

## 2020-04-30 PROCEDURE — 85025 COMPLETE CBC W/AUTO DIFF WBC: CPT

## 2020-04-30 PROCEDURE — 80053 COMPREHEN METABOLIC PANEL: CPT

## 2020-05-01 ENCOUNTER — HOSPITAL ENCOUNTER (OUTPATIENT)
Dept: INFUSION CENTER | Facility: CLINIC | Age: 70
End: 2020-05-01

## 2020-05-20 ENCOUNTER — APPOINTMENT (OUTPATIENT)
Dept: LAB | Facility: CLINIC | Age: 70
End: 2020-05-20
Payer: MEDICARE

## 2020-05-20 ENCOUNTER — TELEPHONE (OUTPATIENT)
Dept: HEMATOLOGY ONCOLOGY | Facility: CLINIC | Age: 70
End: 2020-05-20

## 2020-05-20 DIAGNOSIS — I11.0 HYPERTENSIVE HEART DISEASE WITH DIASTOLIC CONGESTIVE HEART FAILURE, UNSPECIFIED HF CHRONICITY (HCC): ICD-10-CM

## 2020-05-20 DIAGNOSIS — C34.92 ADENOCARCINOMA OF LEFT LUNG, STAGE 4 (HCC): Primary | ICD-10-CM

## 2020-05-20 DIAGNOSIS — E78.2 MIXED HYPERLIPIDEMIA: ICD-10-CM

## 2020-05-20 DIAGNOSIS — I50.30 HYPERTENSIVE HEART DISEASE WITH DIASTOLIC CONGESTIVE HEART FAILURE, UNSPECIFIED HF CHRONICITY (HCC): ICD-10-CM

## 2020-05-20 DIAGNOSIS — E11.9 TYPE 2 DIABETES MELLITUS WITHOUT COMPLICATION, WITHOUT LONG-TERM CURRENT USE OF INSULIN (HCC): ICD-10-CM

## 2020-05-20 LAB
ALBUMIN SERPL BCP-MCNC: 2.8 G/DL (ref 3.5–5)
ALP SERPL-CCNC: 70 U/L (ref 46–116)
ALT SERPL W P-5'-P-CCNC: 21 U/L (ref 12–78)
ANION GAP SERPL CALCULATED.3IONS-SCNC: 8 MMOL/L (ref 4–13)
AST SERPL W P-5'-P-CCNC: 20 U/L (ref 5–45)
BASOPHILS # BLD AUTO: 0.04 THOUSANDS/ΜL (ref 0–0.1)
BASOPHILS NFR BLD AUTO: 1 % (ref 0–1)
BILIRUB SERPL-MCNC: 0.31 MG/DL (ref 0.2–1)
BUN SERPL-MCNC: 29 MG/DL (ref 5–25)
CALCIUM SERPL-MCNC: 9.2 MG/DL (ref 8.3–10.1)
CHLORIDE SERPL-SCNC: 106 MMOL/L (ref 100–108)
CHOLEST SERPL-MCNC: 195 MG/DL (ref 50–200)
CO2 SERPL-SCNC: 27 MMOL/L (ref 21–32)
CREAT SERPL-MCNC: 1.35 MG/DL (ref 0.6–1.3)
CREAT UR-MCNC: 100 MG/DL
EOSINOPHIL # BLD AUTO: 0.24 THOUSAND/ΜL (ref 0–0.61)
EOSINOPHIL NFR BLD AUTO: 5 % (ref 0–6)
ERYTHROCYTE [DISTWIDTH] IN BLOOD BY AUTOMATED COUNT: 12.3 % (ref 11.6–15.1)
EST. AVERAGE GLUCOSE BLD GHB EST-MCNC: 131 MG/DL
GFR SERPL CREATININE-BSD FRML MDRD: 40 ML/MIN/1.73SQ M
GLUCOSE P FAST SERPL-MCNC: 133 MG/DL (ref 65–99)
HBA1C MFR BLD: 6.2 %
HCT VFR BLD AUTO: 36.5 % (ref 34.8–46.1)
HDLC SERPL-MCNC: 39 MG/DL
HGB BLD-MCNC: 11.8 G/DL (ref 11.5–15.4)
IMM GRANULOCYTES # BLD AUTO: 0.01 THOUSAND/UL (ref 0–0.2)
IMM GRANULOCYTES NFR BLD AUTO: 0 % (ref 0–2)
LDLC SERPL CALC-MCNC: 135 MG/DL (ref 0–100)
LYMPHOCYTES # BLD AUTO: 0.9 THOUSANDS/ΜL (ref 0.6–4.47)
LYMPHOCYTES NFR BLD AUTO: 20 % (ref 14–44)
MCH RBC QN AUTO: 30.1 PG (ref 26.8–34.3)
MCHC RBC AUTO-ENTMCNC: 32.3 G/DL (ref 31.4–37.4)
MCV RBC AUTO: 93 FL (ref 82–98)
MICROALBUMIN UR-MCNC: 13.3 MG/L (ref 0–20)
MICROALBUMIN/CREAT 24H UR: 13 MG/G CREATININE (ref 0–30)
MONOCYTES # BLD AUTO: 0.4 THOUSAND/ΜL (ref 0.17–1.22)
MONOCYTES NFR BLD AUTO: 9 % (ref 4–12)
NEUTROPHILS # BLD AUTO: 2.94 THOUSANDS/ΜL (ref 1.85–7.62)
NEUTS SEG NFR BLD AUTO: 65 % (ref 43–75)
NRBC BLD AUTO-RTO: 0 /100 WBCS
PLATELET # BLD AUTO: 173 THOUSANDS/UL (ref 149–390)
PMV BLD AUTO: 11.3 FL (ref 8.9–12.7)
POTASSIUM SERPL-SCNC: 4.1 MMOL/L (ref 3.5–5.3)
PROT SERPL-MCNC: 6.2 G/DL (ref 6.4–8.2)
RBC # BLD AUTO: 3.92 MILLION/UL (ref 3.81–5.12)
SODIUM SERPL-SCNC: 141 MMOL/L (ref 136–145)
TRIGL SERPL-MCNC: 105 MG/DL
TSH SERPL DL<=0.05 MIU/L-ACNC: 0.99 UIU/ML (ref 0.36–3.74)
WBC # BLD AUTO: 4.53 THOUSAND/UL (ref 4.31–10.16)

## 2020-05-20 PROCEDURE — 82570 ASSAY OF URINE CREATININE: CPT

## 2020-05-20 PROCEDURE — 82043 UR ALBUMIN QUANTITATIVE: CPT

## 2020-05-20 PROCEDURE — 36415 COLL VENOUS BLD VENIPUNCTURE: CPT

## 2020-05-20 PROCEDURE — 84443 ASSAY THYROID STIM HORMONE: CPT

## 2020-05-20 PROCEDURE — 80061 LIPID PANEL: CPT

## 2020-05-20 PROCEDURE — 80053 COMPREHEN METABOLIC PANEL: CPT

## 2020-05-20 PROCEDURE — 83036 HEMOGLOBIN GLYCOSYLATED A1C: CPT

## 2020-05-20 PROCEDURE — 85025 COMPLETE CBC W/AUTO DIFF WBC: CPT

## 2020-05-20 RX ORDER — SODIUM CHLORIDE 9 MG/ML
20 INJECTION, SOLUTION INTRAVENOUS ONCE
Status: CANCELLED | OUTPATIENT
Start: 2020-05-22

## 2020-05-22 ENCOUNTER — HOSPITAL ENCOUNTER (OUTPATIENT)
Dept: INFUSION CENTER | Facility: CLINIC | Age: 70
Discharge: HOME | End: 2020-05-22
Payer: MEDICARE

## 2020-05-22 VITALS
BODY MASS INDEX: 33.29 KG/M2 | RESPIRATION RATE: 16 BRPM | HEART RATE: 85 BPM | TEMPERATURE: 98.4 F | SYSTOLIC BLOOD PRESSURE: 132 MMHG | WEIGHT: 195 LBS | OXYGEN SATURATION: 98 % | DIASTOLIC BLOOD PRESSURE: 76 MMHG | HEIGHT: 64 IN

## 2020-05-22 DIAGNOSIS — C34.92 ADENOCARCINOMA OF LEFT LUNG, STAGE 4 (HCC): Primary | ICD-10-CM

## 2020-05-22 PROCEDURE — 96367 TX/PROPH/DG ADDL SEQ IV INF: CPT

## 2020-05-22 PROCEDURE — 96409 CHEMO IV PUSH SNGL DRUG: CPT

## 2020-05-22 RX ORDER — SODIUM CHLORIDE 9 MG/ML
20 INJECTION, SOLUTION INTRAVENOUS ONCE
Status: COMPLETED | OUTPATIENT
Start: 2020-05-22 | End: 2020-05-22

## 2020-05-22 RX ADMIN — SODIUM CHLORIDE 800 MG: 9 INJECTION, SOLUTION INTRAVENOUS at 10:43

## 2020-05-22 RX ADMIN — SODIUM CHLORIDE 20 ML/HR: 0.9 INJECTION, SOLUTION INTRAVENOUS at 09:53

## 2020-05-22 RX ADMIN — ONDANSETRON 16 MG: 2 INJECTION INTRAMUSCULAR; INTRAVENOUS at 09:53

## 2020-05-26 DIAGNOSIS — E11.9 CONTROLLED TYPE 2 DIABETES MELLITUS WITHOUT COMPLICATION, WITHOUT LONG-TERM CURRENT USE OF INSULIN (HCC): ICD-10-CM

## 2020-05-27 ENCOUNTER — OFFICE VISIT (OUTPATIENT)
Dept: FAMILY MEDICINE CLINIC | Facility: CLINIC | Age: 70
End: 2020-05-27
Payer: MEDICARE

## 2020-05-27 VITALS
TEMPERATURE: 97.6 F | DIASTOLIC BLOOD PRESSURE: 70 MMHG | HEART RATE: 72 BPM | WEIGHT: 193 LBS | RESPIRATION RATE: 15 BRPM | SYSTOLIC BLOOD PRESSURE: 130 MMHG | OXYGEN SATURATION: 97 % | HEIGHT: 64 IN | BODY MASS INDEX: 32.95 KG/M2

## 2020-05-27 DIAGNOSIS — I48.91 ATRIAL FIBRILLATION, UNSPECIFIED TYPE (HCC): ICD-10-CM

## 2020-05-27 DIAGNOSIS — F32.5 MAJOR DEPRESSIVE DISORDER WITH SINGLE EPISODE, IN FULL REMISSION (HCC): ICD-10-CM

## 2020-05-27 DIAGNOSIS — I11.0 HYPERTENSIVE HEART DISEASE WITH DIASTOLIC CONGESTIVE HEART FAILURE, UNSPECIFIED HF CHRONICITY (HCC): ICD-10-CM

## 2020-05-27 DIAGNOSIS — C41.9 MALIGNANT NEOPLASM OF BONE WITH METASTASES (HCC): Primary | ICD-10-CM

## 2020-05-27 DIAGNOSIS — E11.9 TYPE 2 DIABETES MELLITUS WITHOUT COMPLICATION, WITHOUT LONG-TERM CURRENT USE OF INSULIN (HCC): ICD-10-CM

## 2020-05-27 DIAGNOSIS — I50.30 HYPERTENSIVE HEART DISEASE WITH DIASTOLIC CONGESTIVE HEART FAILURE, UNSPECIFIED HF CHRONICITY (HCC): ICD-10-CM

## 2020-05-27 DIAGNOSIS — E11.9 CONTROLLED TYPE 2 DIABETES MELLITUS WITHOUT COMPLICATION, WITHOUT LONG-TERM CURRENT USE OF INSULIN (HCC): ICD-10-CM

## 2020-05-27 DIAGNOSIS — E78.2 MIXED HYPERLIPIDEMIA: ICD-10-CM

## 2020-05-27 PROCEDURE — 3066F NEPHROPATHY DOC TX: CPT | Performed by: FAMILY MEDICINE

## 2020-05-27 PROCEDURE — 1160F RVW MEDS BY RX/DR IN RCRD: CPT | Performed by: FAMILY MEDICINE

## 2020-05-27 PROCEDURE — 4040F PNEUMOC VAC/ADMIN/RCVD: CPT | Performed by: FAMILY MEDICINE

## 2020-05-27 PROCEDURE — 3044F HG A1C LEVEL LT 7.0%: CPT | Performed by: FAMILY MEDICINE

## 2020-05-27 PROCEDURE — 99214 OFFICE O/P EST MOD 30 MIN: CPT | Performed by: FAMILY MEDICINE

## 2020-05-27 PROCEDURE — 3078F DIAST BP <80 MM HG: CPT | Performed by: FAMILY MEDICINE

## 2020-05-27 PROCEDURE — 3075F SYST BP GE 130 - 139MM HG: CPT | Performed by: FAMILY MEDICINE

## 2020-05-27 PROCEDURE — 3008F BODY MASS INDEX DOCD: CPT | Performed by: FAMILY MEDICINE

## 2020-05-27 PROCEDURE — 2022F DILAT RTA XM EVC RTNOPTHY: CPT | Performed by: FAMILY MEDICINE

## 2020-05-27 PROCEDURE — 1036F TOBACCO NON-USER: CPT | Performed by: FAMILY MEDICINE

## 2020-05-27 RX ORDER — ONDANSETRON 4 MG/1
4 TABLET, ORALLY DISINTEGRATING ORAL EVERY 6 HOURS PRN
Qty: 30 TABLET | Refills: 3 | Status: SHIPPED | OUTPATIENT
Start: 2020-05-27 | End: 2021-04-15 | Stop reason: ALTCHOICE

## 2020-06-08 ENCOUNTER — TELEPHONE (OUTPATIENT)
Dept: HEMATOLOGY ONCOLOGY | Facility: CLINIC | Age: 70
End: 2020-06-08

## 2020-06-08 ENCOUNTER — APPOINTMENT (OUTPATIENT)
Dept: LAB | Facility: CLINIC | Age: 70
End: 2020-06-08
Payer: MEDICARE

## 2020-06-08 RX ORDER — SODIUM CHLORIDE 9 MG/ML
20 INJECTION, SOLUTION INTRAVENOUS ONCE
Status: CANCELLED | OUTPATIENT
Start: 2020-06-10

## 2020-06-08 RX ORDER — CYANOCOBALAMIN 1000 UG/ML
1000 INJECTION INTRAMUSCULAR; SUBCUTANEOUS ONCE
Status: CANCELLED
Start: 2020-06-10

## 2020-06-10 ENCOUNTER — HOSPITAL ENCOUNTER (OUTPATIENT)
Dept: INFUSION CENTER | Facility: CLINIC | Age: 70
Discharge: HOME | End: 2020-06-10
Payer: MEDICARE

## 2020-06-10 VITALS
WEIGHT: 194 LBS | TEMPERATURE: 97 F | BODY MASS INDEX: 33.12 KG/M2 | OXYGEN SATURATION: 97 % | HEIGHT: 64 IN | HEART RATE: 67 BPM | DIASTOLIC BLOOD PRESSURE: 70 MMHG | SYSTOLIC BLOOD PRESSURE: 132 MMHG

## 2020-06-10 DIAGNOSIS — C34.92 ADENOCARCINOMA OF LEFT LUNG, STAGE 4 (HCC): Primary | ICD-10-CM

## 2020-06-10 PROCEDURE — 96367 TX/PROPH/DG ADDL SEQ IV INF: CPT

## 2020-06-10 PROCEDURE — 96372 THER/PROPH/DIAG INJ SC/IM: CPT

## 2020-06-10 PROCEDURE — 96409 CHEMO IV PUSH SNGL DRUG: CPT

## 2020-06-10 RX ORDER — SODIUM CHLORIDE 9 MG/ML
20 INJECTION, SOLUTION INTRAVENOUS ONCE
Status: COMPLETED | OUTPATIENT
Start: 2020-06-10 | End: 2020-06-10

## 2020-06-10 RX ORDER — CYANOCOBALAMIN 1000 UG/ML
1000 INJECTION INTRAMUSCULAR; SUBCUTANEOUS ONCE
Status: COMPLETED | OUTPATIENT
Start: 2020-06-10 | End: 2020-06-10

## 2020-06-10 RX ADMIN — SODIUM CHLORIDE 20 ML/HR: 0.9 INJECTION, SOLUTION INTRAVENOUS at 09:59

## 2020-06-10 RX ADMIN — ONDANSETRON 16 MG: 2 INJECTION INTRAMUSCULAR; INTRAVENOUS at 10:03

## 2020-06-10 RX ADMIN — SODIUM CHLORIDE 800 MG: 9 INJECTION, SOLUTION INTRAVENOUS at 10:42

## 2020-06-10 RX ADMIN — CYANOCOBALAMIN 1000 MCG: 1000 INJECTION, SOLUTION INTRAMUSCULAR; SUBCUTANEOUS at 10:03

## 2020-06-12 ENCOUNTER — OFFICE VISIT (OUTPATIENT)
Dept: HEMATOLOGY ONCOLOGY | Facility: CLINIC | Age: 70
End: 2020-06-12
Payer: MEDICARE

## 2020-06-12 VITALS
WEIGHT: 195.6 LBS | TEMPERATURE: 97.3 F | HEART RATE: 60 BPM | RESPIRATION RATE: 15 BRPM | BODY MASS INDEX: 33.39 KG/M2 | SYSTOLIC BLOOD PRESSURE: 122 MMHG | OXYGEN SATURATION: 98 % | HEIGHT: 64 IN | DIASTOLIC BLOOD PRESSURE: 68 MMHG

## 2020-06-12 DIAGNOSIS — C41.9 MALIGNANT NEOPLASM OF BONE WITH METASTASES (HCC): Primary | ICD-10-CM

## 2020-06-12 DIAGNOSIS — C34.92 ADENOCARCINOMA OF LEFT LUNG, STAGE 4 (HCC): ICD-10-CM

## 2020-06-12 DIAGNOSIS — C34.92 ADENOCARCINOMA OF LEFT LUNG, STAGE 4 (HCC): Primary | ICD-10-CM

## 2020-06-12 DIAGNOSIS — R79.89 CREATININE ELEVATION: ICD-10-CM

## 2020-06-12 DIAGNOSIS — C41.9 MALIGNANT NEOPLASM OF BONE WITH METASTASES (HCC): ICD-10-CM

## 2020-06-12 PROBLEM — IMO0002 CREATININE ELEVATION: Status: ACTIVE | Noted: 2020-06-12

## 2020-06-12 PROCEDURE — 3078F DIAST BP <80 MM HG: CPT | Performed by: PHYSICIAN ASSISTANT

## 2020-06-12 PROCEDURE — 4040F PNEUMOC VAC/ADMIN/RCVD: CPT | Performed by: PHYSICIAN ASSISTANT

## 2020-06-12 PROCEDURE — 2022F DILAT RTA XM EVC RTNOPTHY: CPT | Performed by: PHYSICIAN ASSISTANT

## 2020-06-12 PROCEDURE — 99214 OFFICE O/P EST MOD 30 MIN: CPT | Performed by: PHYSICIAN ASSISTANT

## 2020-06-12 PROCEDURE — 3066F NEPHROPATHY DOC TX: CPT | Performed by: PHYSICIAN ASSISTANT

## 2020-06-12 PROCEDURE — 3074F SYST BP LT 130 MM HG: CPT | Performed by: PHYSICIAN ASSISTANT

## 2020-06-12 PROCEDURE — 1160F RVW MEDS BY RX/DR IN RCRD: CPT | Performed by: PHYSICIAN ASSISTANT

## 2020-06-12 PROCEDURE — 3044F HG A1C LEVEL LT 7.0%: CPT | Performed by: PHYSICIAN ASSISTANT

## 2020-06-12 PROCEDURE — 3008F BODY MASS INDEX DOCD: CPT | Performed by: PHYSICIAN ASSISTANT

## 2020-06-12 PROCEDURE — 1036F TOBACCO NON-USER: CPT | Performed by: PHYSICIAN ASSISTANT

## 2020-06-29 ENCOUNTER — TELEPHONE (OUTPATIENT)
Dept: HEMATOLOGY ONCOLOGY | Facility: CLINIC | Age: 70
End: 2020-06-29

## 2020-06-29 ENCOUNTER — APPOINTMENT (OUTPATIENT)
Dept: LAB | Facility: CLINIC | Age: 70
End: 2020-06-29
Payer: MEDICARE

## 2020-06-29 ENCOUNTER — TRANSCRIBE ORDERS (OUTPATIENT)
Dept: LAB | Facility: CLINIC | Age: 70
End: 2020-06-29

## 2020-06-29 LAB
ALBUMIN SERPL BCP-MCNC: 2.7 G/DL (ref 3.5–5)
ALP SERPL-CCNC: 88 U/L (ref 46–116)
ALT SERPL W P-5'-P-CCNC: 17 U/L (ref 12–78)
ANION GAP SERPL CALCULATED.3IONS-SCNC: 6 MMOL/L (ref 4–13)
AST SERPL W P-5'-P-CCNC: 29 U/L (ref 5–45)
BASOPHILS # BLD AUTO: 0.01 THOUSANDS/ΜL (ref 0–0.1)
BASOPHILS NFR BLD AUTO: 0 % (ref 0–1)
BILIRUB SERPL-MCNC: 0.27 MG/DL (ref 0.2–1)
BUN SERPL-MCNC: 25 MG/DL (ref 5–25)
CALCIUM SERPL-MCNC: 9.3 MG/DL (ref 8.3–10.1)
CHLORIDE SERPL-SCNC: 107 MMOL/L (ref 100–108)
CO2 SERPL-SCNC: 29 MMOL/L (ref 21–32)
CREAT SERPL-MCNC: 1.28 MG/DL (ref 0.6–1.3)
EOSINOPHIL # BLD AUTO: 0.09 THOUSAND/ΜL (ref 0–0.61)
EOSINOPHIL NFR BLD AUTO: 2 % (ref 0–6)
ERYTHROCYTE [DISTWIDTH] IN BLOOD BY AUTOMATED COUNT: 14.6 % (ref 11.6–15.1)
GFR SERPL CREATININE-BSD FRML MDRD: 42 ML/MIN/1.73SQ M
GLUCOSE P FAST SERPL-MCNC: 141 MG/DL (ref 65–99)
HCT VFR BLD AUTO: 33.4 % (ref 34.8–46.1)
HGB BLD-MCNC: 10.6 G/DL (ref 11.5–15.4)
IMM GRANULOCYTES # BLD AUTO: 0.02 THOUSAND/UL (ref 0–0.2)
IMM GRANULOCYTES NFR BLD AUTO: 0 % (ref 0–2)
LYMPHOCYTES # BLD AUTO: 0.92 THOUSANDS/ΜL (ref 0.6–4.47)
LYMPHOCYTES NFR BLD AUTO: 17 % (ref 14–44)
MCH RBC QN AUTO: 29.3 PG (ref 26.8–34.3)
MCHC RBC AUTO-ENTMCNC: 31.7 G/DL (ref 31.4–37.4)
MCV RBC AUTO: 92 FL (ref 82–98)
MONOCYTES # BLD AUTO: 0.57 THOUSAND/ΜL (ref 0.17–1.22)
MONOCYTES NFR BLD AUTO: 10 % (ref 4–12)
NEUTROPHILS # BLD AUTO: 3.89 THOUSANDS/ΜL (ref 1.85–7.62)
NEUTS SEG NFR BLD AUTO: 71 % (ref 43–75)
NRBC BLD AUTO-RTO: 0 /100 WBCS
PLATELET # BLD AUTO: 261 THOUSANDS/UL (ref 149–390)
PMV BLD AUTO: 10.4 FL (ref 8.9–12.7)
POTASSIUM SERPL-SCNC: 4.4 MMOL/L (ref 3.5–5.3)
PROT SERPL-MCNC: 6.3 G/DL (ref 6.4–8.2)
RBC # BLD AUTO: 3.62 MILLION/UL (ref 3.81–5.12)
SODIUM SERPL-SCNC: 142 MMOL/L (ref 136–145)
WBC # BLD AUTO: 5.5 THOUSAND/UL (ref 4.31–10.16)

## 2020-06-29 PROCEDURE — 80053 COMPREHEN METABOLIC PANEL: CPT

## 2020-06-29 PROCEDURE — 36415 COLL VENOUS BLD VENIPUNCTURE: CPT

## 2020-06-29 PROCEDURE — 85025 COMPLETE CBC W/AUTO DIFF WBC: CPT

## 2020-06-29 RX ORDER — SODIUM CHLORIDE 9 MG/ML
20 INJECTION, SOLUTION INTRAVENOUS ONCE
Status: CANCELLED | OUTPATIENT
Start: 2020-07-01

## 2020-07-01 ENCOUNTER — HOSPITAL ENCOUNTER (OUTPATIENT)
Dept: INFUSION CENTER | Facility: CLINIC | Age: 70
Discharge: HOME | End: 2020-07-01
Payer: MEDICARE

## 2020-07-01 VITALS
HEART RATE: 66 BPM | RESPIRATION RATE: 16 BRPM | WEIGHT: 196 LBS | OXYGEN SATURATION: 99 % | BODY MASS INDEX: 33.46 KG/M2 | HEIGHT: 64 IN | SYSTOLIC BLOOD PRESSURE: 140 MMHG | TEMPERATURE: 97.2 F | DIASTOLIC BLOOD PRESSURE: 60 MMHG

## 2020-07-01 DIAGNOSIS — C34.92 ADENOCARCINOMA OF LEFT LUNG, STAGE 4 (HCC): Primary | ICD-10-CM

## 2020-07-01 PROCEDURE — 96409 CHEMO IV PUSH SNGL DRUG: CPT

## 2020-07-01 PROCEDURE — 96367 TX/PROPH/DG ADDL SEQ IV INF: CPT

## 2020-07-01 RX ORDER — SODIUM CHLORIDE 9 MG/ML
20 INJECTION, SOLUTION INTRAVENOUS ONCE
Status: COMPLETED | OUTPATIENT
Start: 2020-07-01 | End: 2020-07-01

## 2020-07-01 RX ADMIN — SODIUM CHLORIDE 800 MG: 9 INJECTION, SOLUTION INTRAVENOUS at 09:17

## 2020-07-01 RX ADMIN — ONDANSETRON 16 MG: 2 INJECTION INTRAMUSCULAR; INTRAVENOUS at 08:44

## 2020-07-01 RX ADMIN — SODIUM CHLORIDE 20 ML/HR: 0.9 INJECTION, SOLUTION INTRAVENOUS at 08:35

## 2020-07-01 NOTE — PROGRESS NOTES
Pt here for Alimta infusion  Creatine greater than 45  IV started and infusing with NSS @ [facility]  First IV infiltrated and pressure dressing applied  Second IV started with no issue  Vital signs stable  Pt resting comfortably and has no further questions or concerns  Call bell with in reach

## 2020-07-06 ENCOUNTER — TELEPHONE (OUTPATIENT)
Dept: CARDIOLOGY CLINIC | Facility: CLINIC | Age: 70
End: 2020-07-06

## 2020-07-06 NOTE — TELEPHONE ENCOUNTER
Patient called, she is scheduled for a tooth extraction on 7/15 and would like to know how many days prior does she need to hold Eliquis? Please advise     C/b # [phone]

## 2020-07-06 NOTE — PATIENT INSTRUCTIONS
Please protect yourself from the novel Coronavirus (COVID-19)! Even though we STILL do not have a vaccine or good antiviral drugs for this infection, the following strategies can help you stay healthy:    = Wash your hands with soap and water, or hand  with at least 60% alcohol, often  = Avoid touching your face!   = Avoid close contact with others, even if they seem healthy  Avoid gatherings of more than 10 people, and don't travel unnecessarily  = Stay home, except to get medical care or other essentials  If you can eat and drink and breathe and sleep, please consider calling your doctor's office instead of visiting in person, even if you are ill   = Cover your cough with a tissue, or your elbow  = Clean frequently touched surfaces and objects daily (e g , tables, countertops, light switches, doorknobs, and cabinet handles)  Regular household detergent and water are sufficient  Numbers of cases of Coronavirus are spiking in many  States  This is not a more dangerous virus, but a sign that more people in a community are spreading the virus  Please check the local disease reports near you if you consider travelling this summer  We do NOT advise travel to any community or State with a rising viral caseload  Check out [employer] for [place] data that are updated daily  http://www [employer]/   Global Epidemics  Org, from [facility] will give you Hwkxcs-ur-Jykyhw information on virus cases  Https://globalepidemics  org/  PRESCRIPTION REFILL REMINDER:  All medication refills should be requested prior to [facility] on Friday  Any refill requests after noon on Friday would be addressed the following Monday

## 2020-07-07 ENCOUNTER — OFFICE VISIT (OUTPATIENT)
Dept: PALLIATIVE MEDICINE | Facility: CLINIC | Age: 70
End: 2020-07-07
Payer: MEDICARE

## 2020-07-07 VITALS
TEMPERATURE: 99 F | SYSTOLIC BLOOD PRESSURE: 122 MMHG | RESPIRATION RATE: 16 BRPM | HEART RATE: 72 BPM | DIASTOLIC BLOOD PRESSURE: 68 MMHG

## 2020-07-07 DIAGNOSIS — E87.6 HYPOKALEMIA: ICD-10-CM

## 2020-07-07 DIAGNOSIS — R11.0 NAUSEA: Primary | ICD-10-CM

## 2020-07-07 DIAGNOSIS — E11.9 TYPE 2 DIABETES MELLITUS WITHOUT COMPLICATION, WITHOUT LONG-TERM CURRENT USE OF INSULIN (HCC): ICD-10-CM

## 2020-07-07 DIAGNOSIS — R22.42 LOCALIZED SWELLING OF LEFT FOOT: ICD-10-CM

## 2020-07-07 PROCEDURE — 3066F NEPHROPATHY DOC TX: CPT | Performed by: FAMILY MEDICINE

## 2020-07-07 PROCEDURE — 3074F SYST BP LT 130 MM HG: CPT | Performed by: FAMILY MEDICINE

## 2020-07-07 PROCEDURE — 2022F DILAT RTA XM EVC RTNOPTHY: CPT | Performed by: FAMILY MEDICINE

## 2020-07-07 PROCEDURE — 3078F DIAST BP <80 MM HG: CPT | Performed by: FAMILY MEDICINE

## 2020-07-07 PROCEDURE — 1036F TOBACCO NON-USER: CPT | Performed by: FAMILY MEDICINE

## 2020-07-07 PROCEDURE — 99214 OFFICE O/P EST MOD 30 MIN: CPT | Performed by: FAMILY MEDICINE

## 2020-07-07 PROCEDURE — 4040F PNEUMOC VAC/ADMIN/RCVD: CPT | Performed by: FAMILY MEDICINE

## 2020-07-07 PROCEDURE — 3044F HG A1C LEVEL LT 7.0%: CPT | Performed by: FAMILY MEDICINE

## 2020-07-07 RX ORDER — METOCLOPRAMIDE 10 MG/1
5 TABLET ORAL
Qty: 60 TABLET | Refills: 0 | Status: SHIPPED | OUTPATIENT
Start: 2020-07-07 | End: 2020-09-25

## 2020-07-07 RX ORDER — POTASSIUM CHLORIDE 20 MEQ/1
40 TABLET, EXTENDED RELEASE ORAL DAILY PRN
Start: 2020-07-07 | End: 2021-07-19

## 2020-07-07 RX ORDER — FUROSEMIDE 40 MG/1
40 TABLET ORAL AS NEEDED
Start: 2020-07-07 | End: 2022-01-24

## 2020-07-07 RX ORDER — METFORMIN HYDROCHLORIDE EXTENDED-RELEASE TABLETS 500 MG/1
1000 TABLET, FILM COATED, EXTENDED RELEASE ORAL 2 TIMES DAILY WITH MEALS
Qty: 120 TABLET | Refills: 0 | Status: SHIPPED | OUTPATIENT
Start: 2020-07-07 | End: 2020-11-23 | Stop reason: SDUPTHER

## 2020-07-09 ENCOUNTER — TELEPHONE (OUTPATIENT)
Dept: NEPHROLOGY | Facility: CLINIC | Age: 70
End: 2020-07-09

## 2020-07-10 ENCOUNTER — TELEPHONE (OUTPATIENT)
Dept: PALLIATIVE MEDICINE | Facility: CLINIC | Age: 70
End: 2020-07-10

## 2020-07-13 ENCOUNTER — HOSPITAL ENCOUNTER (OUTPATIENT)
Dept: CT IMAGING | Facility: HOSPITAL | Age: 70
Discharge: HOME | End: 2020-07-13
Attending: INTERNAL MEDICINE
Payer: MEDICARE

## 2020-07-13 DIAGNOSIS — C41.9 MALIGNANT NEOPLASM OF BONE WITH METASTASES (HCC): ICD-10-CM

## 2020-07-13 DIAGNOSIS — C34.92 ADENOCARCINOMA OF LEFT LUNG, STAGE 4 (HCC): ICD-10-CM

## 2020-07-13 PROCEDURE — 71250 CT THORAX DX C-: CPT

## 2020-07-13 PROCEDURE — 74176 CT ABD & PELVIS W/O CONTRAST: CPT

## 2020-07-15 ENCOUNTER — OFFICE VISIT (OUTPATIENT)
Dept: FAMILY MEDICINE CLINIC | Facility: CLINIC | Age: 70
End: 2020-07-15
Payer: MEDICARE

## 2020-07-15 VITALS
BODY MASS INDEX: 32.81 KG/M2 | HEART RATE: 67 BPM | TEMPERATURE: 97.2 F | DIASTOLIC BLOOD PRESSURE: 70 MMHG | WEIGHT: 192.2 LBS | HEIGHT: 64 IN | RESPIRATION RATE: 16 BRPM | OXYGEN SATURATION: 98 % | SYSTOLIC BLOOD PRESSURE: 150 MMHG

## 2020-07-15 DIAGNOSIS — K21.9 GERD WITHOUT ESOPHAGITIS: ICD-10-CM

## 2020-07-15 DIAGNOSIS — E11.9 TYPE 2 DIABETES MELLITUS WITHOUT COMPLICATION, WITHOUT LONG-TERM CURRENT USE OF INSULIN (HCC): ICD-10-CM

## 2020-07-15 DIAGNOSIS — R22.42 LOCALIZED SWELLING OF LEFT FOOT: ICD-10-CM

## 2020-07-15 DIAGNOSIS — C79.52 SECONDARY MALIGNANT NEOPLASM OF BONE AND BONE MARROW (HCC): ICD-10-CM

## 2020-07-15 DIAGNOSIS — C79.51 SECONDARY MALIGNANT NEOPLASM OF BONE AND BONE MARROW (HCC): ICD-10-CM

## 2020-07-15 DIAGNOSIS — L03.116 CELLULITIS OF LEFT LOWER EXTREMITY: Primary | ICD-10-CM

## 2020-07-15 PROCEDURE — 3078F DIAST BP <80 MM HG: CPT | Performed by: FAMILY MEDICINE

## 2020-07-15 PROCEDURE — 3066F NEPHROPATHY DOC TX: CPT | Performed by: FAMILY MEDICINE

## 2020-07-15 PROCEDURE — 1160F RVW MEDS BY RX/DR IN RCRD: CPT | Performed by: FAMILY MEDICINE

## 2020-07-15 PROCEDURE — 99214 OFFICE O/P EST MOD 30 MIN: CPT | Performed by: FAMILY MEDICINE

## 2020-07-15 PROCEDURE — 2022F DILAT RTA XM EVC RTNOPTHY: CPT | Performed by: FAMILY MEDICINE

## 2020-07-15 PROCEDURE — 3077F SYST BP >= 140 MM HG: CPT | Performed by: FAMILY MEDICINE

## 2020-07-15 PROCEDURE — 4040F PNEUMOC VAC/ADMIN/RCVD: CPT | Performed by: FAMILY MEDICINE

## 2020-07-15 PROCEDURE — 3044F HG A1C LEVEL LT 7.0%: CPT | Performed by: FAMILY MEDICINE

## 2020-07-15 PROCEDURE — 1036F TOBACCO NON-USER: CPT | Performed by: FAMILY MEDICINE

## 2020-07-15 PROCEDURE — 3008F BODY MASS INDEX DOCD: CPT | Performed by: FAMILY MEDICINE

## 2020-07-15 RX ORDER — CLINDAMYCIN HYDROCHLORIDE 150 MG/1
CAPSULE ORAL
COMMUNITY
Start: 2020-07-06 | End: 2020-08-31

## 2020-07-15 RX ORDER — OMEPRAZOLE 20 MG/1
20 CAPSULE, DELAYED RELEASE ORAL DAILY
Qty: 90 CAPSULE | Refills: 3 | Status: SHIPPED | OUTPATIENT
Start: 2020-07-15 | End: 2021-04-19 | Stop reason: SDUPTHER

## 2020-07-15 RX ORDER — SULFAMETHOXAZOLE AND TRIMETHOPRIM 800; 160 MG/1; MG/1
1 TABLET ORAL EVERY 12 HOURS SCHEDULED
Qty: 20 TABLET | Refills: 0 | Status: SHIPPED | OUTPATIENT
Start: 2020-07-15 | End: 2020-07-25

## 2020-07-15 NOTE — PROGRESS NOTES
Assessment/Plan:    1  Cellulitis of left lower extremity  Assessment & Plan:  Start antibiotic   Increase po water  Elevate leg    Orders:  -     sulfamethoxazole-trimethoprim (BACTRIM DS) 800-160 mg per tablet; Take 1 tablet by mouth every 12 (twelve) hours for 10 days    2  Localized swelling of left foot  Assessment & Plan:  Elevate leg      3  Secondary malignant neoplasm of bone and bone marrow [facility])  Assessment & Plan:  Seeing Dr Leopoldo Prose Monday  Has CT      4  Type 2 diabetes mellitus without complication, without long-term current use of insulin ([facility])  Assessment & Plan:  stable  Lab Results   Component Value Date    HGBA1C 6 2 (H) 05/20/2020              There are no Patient Instructions on file for this visit  No follow-ups on file  Subjective:      Patient ID: Alton Olmstead is a 65 y o  female  Chief Complaint   Patient presents with    Leg Swelling     Patient here with left leg redness swelling warm to the touch in the afternoon        Here for left lower leg swelling and reddness   On and off   4 week  Outside without shoes  2 weeks ago    Rash   This is a new problem  The current episode started 1 to 4 weeks ago  The affected locations include the left lower leg  The rash is characterized by redness and swelling  She was exposed to nothing  Pertinent negatives include no fatigue  Past treatments include cold compress  The treatment provided mild relief  The following portions of the patient's history were reviewed and updated as appropriate:  past social history    Review of Systems   Constitutional: Negative for fatigue  HENT: Negative  Eyes: Negative  Respiratory: Negative  Cardiovascular: Negative  Gastrointestinal: Negative  Musculoskeletal: Positive for arthralgias and back pain  Skin: Positive for rash  Allergic/Immunologic: Negative  Neurological: Negative  Hematological: Negative  Psychiatric/Behavioral: Negative            Current Outpatient Medications   Medication Sig Dispense Refill    acetaminophen (TYLENOL) 325 mg tablet Take 650 mg by mouth every 6 (six) hours as needed for mild pain      apixaban (Eliquis) 5 mg TAKE 1 TABLET TWICE A  tablet 3    Calcium Carb-Cholecalciferol 600-800 MG-UNIT TABS Take 1 tablet by mouth 2 (two) times a day      Cholecalciferol (VITAMIN D) 2000 units CAPS Take by mouth      cyanocobalamin (VITAMIN B-12) 100 mcg tablet Take by mouth daily      dexamethasone (DECADRON) 4 mg tablet TAKE ONE TAB TWICE A DAY WITH FOOD, DAY BEFORE,DAY OF,DAY AFTER CHEMO 90 tablet 1    fluconazole (DIFLUCAN) 150 mg tablet Take 1 tablet (150 mg total) by mouth once a week for 52 doses (Patient taking differently: Take 150 mg by mouth as needed ) 52 tablet 0    Folic Acid 0 8 MG CAPS       furosemide (LASIX) 40 mg tablet Take 1 tablet (40 mg total) by mouth as needed (leg swelling, fluid overload)      linaGLIPtin (Tradjenta) 5 MG TABS Take 5 mg by mouth daily 90 tablet 3    lisinopril (ZESTRIL) 10 mg tablet Take 1 tablet (10 mg total) by mouth daily 90 tablet 3    metFORMIN (FORTAMET) 500 MG (OSM) 24 hr tablet Take 2 tablets (1,000 mg total) by mouth 2 (two) times a day with meals Take in place of old metformin 120 tablet 0    metFORMIN (GLUCOPHAGE) 1000 MG tablet TAKE 1 TABLET TWICE DAILY  WITH MEALS 180 tablet 3    metoclopramide (REGLAN) 10 mg tablet Take 0 5 tablets (5 mg total) by mouth 3 (three) times a day before meals As needed for nausea 60 tablet 0    metoprolol tartrate (LOPRESSOR) 25 mg tablet Take 1 tablet (25 mg total) by mouth every 12 (twelve) hours 180 tablet 3    nystatin (MYCOSTATIN) cream Apply topically 2 (two) times a day (Patient taking differently: Apply topically as needed ) 30 g 5    omeprazole (PriLOSEC) 20 mg delayed release capsule Take 1 capsule (20 mg total) by mouth daily 90 capsule 3    ondansetron (ZOFRAN-ODT) 4 mg disintegrating tablet Take 1 tablet (4 mg total) by mouth every 6 (six) hours as needed for nausea or vomiting 30 tablet 3    potassium chloride (K-DUR,KLOR-CON) 20 mEq tablet Take 2 tablets (40 mEq total) by mouth daily as needed (low K+)      sotalol (BETAPACE) 80 mg tablet Take 1 tablet (80 mg total) by mouth 2 (two) times a day 1 tab PO  tablet 3    venlafaxine (EFFEXOR) 37 5 mg tablet Take 1 tablet (37 5 mg total) by mouth daily 90 tablet 1    clindamycin (CLEOCIN) 150 mg capsule TAKE FOUR CAPSULES BY MOUTH ONE HOUR BEFORE APPOINTMENT      sulfamethoxazole-trimethoprim (BACTRIM DS) 800-160 mg per tablet Take 1 tablet by mouth every 12 (twelve) hours for 10 days 20 tablet 0     No current facility-administered medications for this visit  Objective:    /70   Pulse 67   Temp (!) 97 2 °F (36 2 °C)   Resp 16   Ht 5' 4" (1 626 m)   Wt 87 2 kg (192 lb 3 2 oz)   LMP  (LMP Unknown)   SpO2 98%   BMI 32 99 kg/m²      Physical Exam   Constitutional: She appears well-developed and well-nourished  HENT:   Head: Normocephalic and atraumatic  Eyes: Pupils are equal, round, and reactive to light  EOM are normal    Neck: Normal range of motion  Neck supple  Cardiovascular: Normal rate, regular rhythm, normal heart sounds and intact distal pulses  Pulmonary/Chest: Effort normal and breath sounds normal    Abdominal: Soft  Musculoskeletal: She exhibits edema (left lower lef reddness half way up the calf, swelling)  Skin: There is erythema  Nursing note and vitals reviewed            Davis Conti DO

## 2020-07-15 NOTE — TELEPHONE ENCOUNTER
Prior authorization for pt's   Fortamet 500 mg  120/30    has been initiated and sent along with last office note  Awaiting determination

## 2020-07-20 ENCOUNTER — OFFICE VISIT (OUTPATIENT)
Dept: HEMATOLOGY ONCOLOGY | Facility: CLINIC | Age: 70
End: 2020-07-20
Payer: MEDICARE

## 2020-07-20 VITALS
HEART RATE: 62 BPM | SYSTOLIC BLOOD PRESSURE: 140 MMHG | TEMPERATURE: 96 F | BODY MASS INDEX: 32.95 KG/M2 | RESPIRATION RATE: 17 BRPM | DIASTOLIC BLOOD PRESSURE: 80 MMHG | WEIGHT: 193 LBS | OXYGEN SATURATION: 98 % | HEIGHT: 64 IN

## 2020-07-20 DIAGNOSIS — C34.92 ADENOCARCINOMA OF LEFT LUNG, STAGE 4 (HCC): Primary | ICD-10-CM

## 2020-07-20 DIAGNOSIS — K86.2 PANCREAS CYST: ICD-10-CM

## 2020-07-20 PROCEDURE — 3079F DIAST BP 80-89 MM HG: CPT | Performed by: INTERNAL MEDICINE

## 2020-07-20 PROCEDURE — 4040F PNEUMOC VAC/ADMIN/RCVD: CPT | Performed by: INTERNAL MEDICINE

## 2020-07-20 PROCEDURE — 3008F BODY MASS INDEX DOCD: CPT | Performed by: INTERNAL MEDICINE

## 2020-07-20 PROCEDURE — 2022F DILAT RTA XM EVC RTNOPTHY: CPT | Performed by: INTERNAL MEDICINE

## 2020-07-20 PROCEDURE — 99214 OFFICE O/P EST MOD 30 MIN: CPT | Performed by: INTERNAL MEDICINE

## 2020-07-20 PROCEDURE — 1036F TOBACCO NON-USER: CPT | Performed by: INTERNAL MEDICINE

## 2020-07-20 PROCEDURE — 3077F SYST BP >= 140 MM HG: CPT | Performed by: INTERNAL MEDICINE

## 2020-07-20 PROCEDURE — 1160F RVW MEDS BY RX/DR IN RCRD: CPT | Performed by: INTERNAL MEDICINE

## 2020-07-20 PROCEDURE — 3066F NEPHROPATHY DOC TX: CPT | Performed by: INTERNAL MEDICINE

## 2020-07-20 PROCEDURE — 3044F HG A1C LEVEL LT 7.0%: CPT | Performed by: INTERNAL MEDICINE

## 2020-07-20 NOTE — PROGRESS NOTES
Hematology Outpatient Follow - Up Note  Katina Griffith 65 y o  female MRN: @ Encounter: [id number]        Date:  7/20/2020        Assessment/ Plan:    68-year-old  female with history of stage 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 of the lung with left scapula involvement diagnosed on 08/2016 PDL expression more than 50%, negative for EGFR mutation, ALK  rearrangement, Ros1 mutation     Treated initially with Pembrolizumab complicated with pneumonitis and later on nivolumab with prednisone 10 mg p o  Daily with excellent response      Disease progression in August 2018 in the left scapula with pain no new lesions by PET scan   Status post radiation therapy to the left scapula and treated with Alimta 500 milligram/meter squared, carboplatin AUC 5   After 3 cycles,  CT scan in January 2019 showed stable disease, currently on maintenance Alimta 500 milligram/meter squared every 3 weeks           CT scan 4/2020 showed stable disease in the left upper lobe of the lung , no evidence of new lesions    Alimta dose was reduced to 400 milligram/meter subcutaneously creatinine came down to 1 28     Cycle number 24 on 07/01/2020, CT scan showed stable disease, stable pancreatic lesion    Now she has cellulitis of the left lower extremity she is on antibiotics, will postpone Alimta till next week at 400 milligram/meter squared    Stable pancreatic lesion, continue watchful observation            HPI: Hardeep Reinoso was admitted to the hospital with arrhythmia and was found to have right lower lobe infiltrate in August 2016   She wasreated with antibiotics however repeat chest x-ray showed persistent right lower lobe infiltrate   Subsequently the patient had a CT scan of the chest which showed a right perihilar mass, subcarinal lymphadenopathy, lytic lesion of the right costovertebral junction at T10 level   PET scan October 2016 showed a right perihilar mass measuring 3 5 cm with SUV of 8 9, subcarinal lymph nodes measuring 3 4 x 2 2 cm with SUV of 9 2  Nodule in the left upper lobe lung measured 2 x 1 1 cm   There was a lytic lesion involving the right 10th costovertebral junction with SUV of 14  4      Biopsy showed non-small cell carcinoma with features suggesting of adenocarcinoma positive for CK 7, CK 19, CA-19-9, ANEUDY-3, partially positive for P40, p63, negative for TTF-1   She had a history of uterine cancer in 2000 status post hysterectomy and did not require radiation or chemotherapy   She is status post bilateral oophorectomy, right knee replacement,   tonsillectomy       She used to smoke for 35 years 1 pack per day however quit smoking 21 years ago   She used hormonal replacement therapy for 3 years  Quinn Bauman has a family history significant for skin cancer in her father and coronary artery disease in mother  Quinn Bauman has 2 healthy children      Treated initially with Pembrolizumab December 2016, Finished in May 2017 secondary to grade 3 pneumonitis  Initiated on prednisone     Progression: Nivolumab 240 mg flat dose every 2 weeks along with prednisone 10 mg p o  Daily initiated April 2018- October 2018 with excellent response      Liquid biopsy showed K-MADHURI mutation G12C, no evidence of MSI high        Disease progression in August 2018 in the left scapula with pain no new lesions by PET scan   Status post radiation therapy to the left scapula and treated with Alimta 500 milligram/meter squared, carboplatin AUC 5   After 3 cycles,  CT scan in January 2019 showed stable disease  Carbo discontinued 3/2019    Maintenance Alimta 500 milligram/meter squared every 3 weeks initiated 3/2019        Cr 2/20 was 1 5  Plan was to dose reduce Alimta to 400mg/m2; however cr 2 16 2/24/20 and Alimta was held       3/4/20:  renal u/s  Minimal fullness of the left renal collecting system without matthieu hydronephrosis     Chronic right kidney lower pole cortical scar, with adjacent parenchymal calcification measuring 5 mm         She was on prednisone 5 mg p o  daily because of previous history of pneumonitis  CT scan chest 1/3/2020 showed no evidence of infiltration in the lung parenchyma, prednisone was reduced every other day for 1 month and then discontinued        CT scan 4/2020 showed stable disease in the left upper lobe of the lung    CT scan on 07/2020 showed stable disease in the left upper lobe of the lung, pancreatic cyst 2 3 cm, stable from the previous CT scan however bigger from last year CT scan      Interval History:  Cellulitis of the left lower extremity currently on antibiotics       Previous Treatment:         Test Results:    Imaging: Ct Chest Abdomen Pelvis Wo Contrast    Result Date: 7/15/2020  Narrative: CT CHEST, ABDOMEN AND PELVIS WITHOUT IV CONTRAST INDICATION:   C34 92: Malignant neoplasm of unspecified part of left bronchus or lung C41 9: 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, unspecified  On chemotherapy; follow-up COMPARISON:  4/24/2020; 1/3/2020; 3/7/2019  TECHNIQUE: CT examination of the chest, abdomen and pelvis was performed without intravenous contrast   Axial, sagittal, and coronal 2D reformatted images were created from the source data and submitted for interpretation  Radiation dose length product (DLP) for this visit:  964 mGy-cm   This examination, like all CT scans performed in the [facility], was performed utilizing techniques to minimize radiation dose exposure, including the use of iterative reconstruction and automated exposure control  Enteric contrast was administered  FINDINGS: CHEST LUNGS:  Fissural thickening abuts the major fissure on image 67, series 3 similar to the prior examination  Or nodular thickening is also stable on image 76 on the right  Pleural-parenchymal scarring is seen superiorly in the left lower lobe without change  There is an irregular macrolobulated mass in the left upper lobe which is somewhat difficult to measure    On image 44 measures 2 4 x 1 4 cm, previously measuring 1 8 x 1 5 cm measured in a similar fashion  Slightly superior there is a more spiculated appearance with mixed groundglass and solid features  A nodular component measures approximately 9 mm as compared to 11 mm on image 39  A more peripheral nodule measures 1 cm on image 41, previously 9 mm  Small nodule at the left lung base is seen on image 88 measuring 7 mm without change  There is no tracheal or endobronchial lesion  PLEURA:  Unremarkable  HEART/GREAT VESSELS:  Faint coronary calcification is present  MEDIASTINUM AND ANABEL:  Small mediastinal nodes are unchanged  CHEST WALL AND LOWER NECK:   Unremarkable  ABDOMEN LIVER/BILIARY TREE:  Unremarkable  GALLBLADDER: The patient is status post cholecystectomy  The proximal common bile duct measures up to 1 6 cm without change but appears to taper towards the biliary papilla  Some of this may be related to a cystic duct remanent  SPLEEN:  Unremarkable  PANCREAS:  There is a 2 4 x 2 5 cm pancreatic cystic lesion without significant change  ADRENAL GLANDS:  Unremarkable  KIDNEYS/URETERS:  Unremarkable  No hydronephrosis  STOMACH AND BOWEL:  Unremarkable  APPENDIX:  The patient is status post appendectomy  ABDOMINOPELVIC CAVITY:  No ascites  No pneumoperitoneum  No lymphadenopathy  Surgical clips are noted within the pelvis  A few clips are also noted within the retroperitoneum  VESSELS:  Unremarkable for patient's age  PELVIS REPRODUCTIVE ORGANS:  The patient is status post hysterectomy  URINARY BLADDER:  Unremarkable  ABDOMINAL WALL/INGUINAL REGIONS:  Unremarkable  OSSEOUS STRUCTURES:  No acute fracture or destructive osseous lesion  There is bone island present in the left ilium  Ununited right 10th rib fractures are again identified  Sclerotic lesion of the left scapula is unchanged    There is a lytic component inferiorly also stable measuring approximately 2 6 cm in size transversely at the lower pole of the scapula  Impression: Presumed neoplastic left upper lobe lesions are not significantly changed since the study of April  No significant adenopathy is present  Other small lung nodules are stable  2 5 cm pancreatic cystic lesion is again demonstrated similar to the most recent examination but slightly larger than older exam from 2019  MRI/MRCP could be considered  Workstation performed: [id number]       Labs:   Lab Results   Component Value Date    WBC 5 50 06/29/2020    HGB 10 6 (L) 06/29/2020    HCT 33 4 (L) 06/29/2020    MCV 92 06/29/2020     06/29/2020     Lab Results   Component Value Date     11/23/2015    K 4 4 06/29/2020     06/29/2020    CO2 29 06/29/2020    ANIONGAP 9 11/23/2015    BUN 25 06/29/2020    CREATININE 1 28 06/29/2020    GLUCOSE 149 (H) 11/23/2015    GLUF 141 (H) 06/29/2020    CALCIUM 9 3 06/29/2020    AST 29 06/29/2020    ALT 17 06/29/2020    ALKPHOS 88 06/29/2020    PROT 6 8 11/23/2015    BILITOT 0 65 11/23/2015    EGFR 42 06/29/2020       No results found for: IRON, TIBC, FERRITIN    No results found for: [id number]      ROS: Review of Systems   Constitutional: Negative for appetite change, chills, diaphoresis, fatigue and unexpected weight change  HENT:   Negative for mouth sores, nosebleeds, sore throat, trouble swallowing and voice change  Eyes: Negative for eye problems and icterus  Respiratory: Negative for chest tightness, cough, hemoptysis and wheezing  Cardiovascular: Negative for chest pain, leg swelling and palpitations  Gastrointestinal: Negative for abdominal distention, abdominal pain, blood in stool, constipation, diarrhea, nausea and vomiting  Endocrine: Negative for hot flashes  Genitourinary: Negative for bladder incontinence, difficulty urinating, dyspareunia, dysuria and frequency  Musculoskeletal: Negative for arthralgias, back pain, gait problem, neck pain and neck stiffness  Skin: Negative for itching and rash  Neurological: Negative for dizziness, gait problem, headaches, numbness, seizures and speech difficulty  Hematological: Negative for adenopathy  Does not bruise/bleed easily  Psychiatric/Behavioral: Negative for decreased concentration, depression, sleep disturbance and suicidal ideas  The patient is not nervous/anxious  Current Medications: Reviewed  Allergies: Reviewed  PMH/FH/SH:  Reviewed      Physical Exam:    Body surface area is 1 93 meters squared  Wt Readings from Last 3 Encounters:   07/20/20 87 5 kg (193 lb)   07/15/20 87 2 kg (192 lb 3 2 oz)   07/01/20 88 9 kg (196 lb)        Temp Readings from Last 3 Encounters:   07/20/20 (!) 96 °F (35 6 °C)   07/15/20 (!) 97 2 °F (36 2 °C)   07/07/20 99 °F (37 2 °C) (Oral)        BP Readings from Last 3 Encounters:   07/20/20 140/80   07/15/20 150/70   07/07/20 122/68         Pulse Readings from Last 3 Encounters:   07/20/20 62   07/15/20 67   07/07/20 72        Physical Exam   Constitutional: She is oriented to person, place, and time  She appears well-developed and well-nourished  No distress  HENT:   Head: Normocephalic and atraumatic  Eyes: Conjunctivae are normal    Neck: Normal range of motion  Neck supple  No tracheal deviation present  Cardiovascular: Normal rate and regular rhythm  Exam reveals no gallop and no friction rub  No murmur heard  Pulmonary/Chest: Effort normal and breath sounds normal  No respiratory distress  She has no wheezes  She has no rales  She exhibits no tenderness  Abdominal: Soft  She exhibits no distension  There is no tenderness  Lymphadenopathy:     She has no cervical adenopathy  Neurological: She is alert and oriented to person, place, and time  Skin: Skin is warm and dry  She is not diaphoretic  There is erythema ( of the left lower extremity secondary to cellulitis)  No pallor  Psychiatric: She has a normal mood and affect   Her behavior is normal  Judgment and thought content normal    Vitals reviewed  ECO    Goals and Barriers:  Current Goal: Minimize effects of disease  Barriers: None  Patient's Capacity to Self Care:  Patient is able to self care      Code Status: @[facility]@

## 2020-07-23 ENCOUNTER — TELEPHONE (OUTPATIENT)
Dept: FAMILY MEDICINE CLINIC | Facility: CLINIC | Age: 70
End: 2020-07-23

## 2020-07-23 ENCOUNTER — CONSULT (OUTPATIENT)
Dept: NEPHROLOGY | Facility: CLINIC | Age: 70
End: 2020-07-23
Payer: MEDICARE

## 2020-07-23 VITALS
SYSTOLIC BLOOD PRESSURE: 144 MMHG | RESPIRATION RATE: 16 BRPM | BODY MASS INDEX: 32.78 KG/M2 | WEIGHT: 192 LBS | HEART RATE: 74 BPM | DIASTOLIC BLOOD PRESSURE: 74 MMHG | TEMPERATURE: 97.4 F | HEIGHT: 64 IN

## 2020-07-23 DIAGNOSIS — N18.30 STAGE 3 CHRONIC KIDNEY DISEASE (HCC): ICD-10-CM

## 2020-07-23 DIAGNOSIS — N18.30 ANEMIA IN STAGE 3 CHRONIC KIDNEY DISEASE (HCC): ICD-10-CM

## 2020-07-23 DIAGNOSIS — N18.30 BENIGN HYPERTENSION WITH CKD (CHRONIC KIDNEY DISEASE) STAGE III (HCC): Primary | ICD-10-CM

## 2020-07-23 DIAGNOSIS — D63.1 ANEMIA IN STAGE 3 CHRONIC KIDNEY DISEASE (HCC): ICD-10-CM

## 2020-07-23 DIAGNOSIS — I12.9 BENIGN HYPERTENSION WITH CKD (CHRONIC KIDNEY DISEASE) STAGE III (HCC): Primary | ICD-10-CM

## 2020-07-23 PROBLEM — IMO0002 CREATININE ELEVATION: Status: RESOLVED | Noted: 2020-06-12 | Resolved: 2020-07-23

## 2020-07-23 PROBLEM — R79.89 CREATININE ELEVATION: Status: RESOLVED | Noted: 2020-06-12 | Resolved: 2020-07-23

## 2020-07-23 PROBLEM — N17.9 AKI (ACUTE KIDNEY INJURY) (HCC): Status: RESOLVED | Noted: 2020-02-26 | Resolved: 2020-07-23

## 2020-07-23 PROCEDURE — 3044F HG A1C LEVEL LT 7.0%: CPT | Performed by: INTERNAL MEDICINE

## 2020-07-23 PROCEDURE — 99204 OFFICE O/P NEW MOD 45 MIN: CPT | Performed by: INTERNAL MEDICINE

## 2020-07-23 NOTE — PATIENT INSTRUCTIONS
- take lasix 40 mg daily for next 3 days  -repeat blood and urine test in 1-2 weeks  -if blood pressure at home remains >140/90, please call office to discuss starting losartan  -salt restricted diet  Recommend to follow a healthy lifestyle including following a low-salt diet, avoid smoking, do regular physical activity, avoid anti-inflammatory/NSAIDs medication (no ibuprofen, Motrin, Advil, Naproxen, Aleve, Mobic/Meloxicam, etc ), avoid excessive weight gain and continue with regular follow-up as instructed  Hypertension and diabetes are the two most common causes of chronic kidney disease (CKD), if you have any of them, it is important to have a good blood sugar (hemoglobin A1c less than 7 percent) as well as good blood pressure control to slow down the progression of your CKD

## 2020-07-23 NOTE — PROGRESS NOTES
NEPHROLOGY OUTPATIENT CONSULTATION   Christina Griffith 65 y o  female MRN: [id number]  Date: 7/23/2020  Reason for consultation:   Chief Complaint   Patient presents with    Consult    Acute Renal Failure    Chronic Kidney Disease     ASSESSMENT AND PLAN:  CKD stage 3, baseline creatinine 1 2 to 1 3 since late 2019, prior 0 9 to 1 1 in early 2019  -patient had an episode of ADITYA with peak creatinine 2 1 in February 2020 and since then creatinine seems to be fluctuating 1 3 to 1 6  Most recent creatinine improved to 1 2 in June 2020    -due to recent increase in creatinine to 1 6, lisinopril was held  Continue to hold for time being  -CKD likely multifactorial in the setting of long-term history of hypertension/diabetes, chemo induced nephrotoxicity remains differential (was on Keytruda in 2016, currently on Alimta (Pemetrexed) - can occasionally cause renal failure)  -patient was started on Bactrim for cellulitis on 7/15/20 which she will be finishing tomorrow  Repeat BMP next month   -was unable to provide urine sample in the office today  -urine microalbumin/creatinine ratio nonsignificant in May 2020  No recent UA available  Check UA with microscopy, urine microalbumin/creatinine ratio    -avoid nephrotoxins or NSAIDs  -CT scan in July 2020 shows no hydronephrosis  -renal ultrasound in March 2020 shows normal size kidneys, chronic right kidney lower pole cortical scar, no stones, normal echogenicity  Hypertension  -blood pressure slightly above goal in the office today   -she is on metoprolol, sotalol for AFib issues  Lisinopril remains on hold  She had dry cough issues with lisinopril  Given mild leg edema, recommended to take Lasix 40 mg daily for next three days and then continue as p r n   -she will be doing repeat labs in 1 to 2 weeks  If renal function remains stable, consider starting losartan  -salt restricted diet    Anemia in CKD, continue to closely monitor      Diabetes, most recent hemoglobin A1c improved 6 2  Stage IV lung adenocarcinoma with metastasis to bone, currently on chemo as mentioned above  Follows with Hematology  HISTORY OF PRESENT ILLNESS:  Ana Lilia More is a 65y o  year old female with medical issues of hypertension for 15 years, diabetes for 15 years, AFib, stage IV lung adenocarcinoma diagnosed in 2016 with metastasis to bone, status post radiation, currently on chemo, prior history of smoking, CKD stage 3 with baseline creatinine 1 2 to 1 3 since late 2019, who presents for initial consultation for CKD management  Old medical records were reviewed  Patient's prior baseline creatinine 0 9 to 1 1 in early 2019 although seems to be slowly progressing to 1 2 to 1 3 since late 2019  She had an episode of ADITYA with peak creatinine 2 1 in February 2020 and since then creatinine fluctuating 1 3 to 1 6  Recently had an creatinine up to 1 6 when lisinopril was held in early June 2020  Since then creatinine has improved to 1 2  She recently had an episode of leg cellulitis and was started on Bactrim on 7/15/20 by PCP  She will be finishing Bactrim tomorrow  Currently remains off lisinopril  Denies any recent NSAID use  Denies any urinary complaint  Denies chest pain, shortness of breath, nausea, vomiting  She does have off and on nausea issues after receiving chemo which she sees every three weeks  She admits not to be drinking enough water  She does have mild leg edema more on left leg due to cellulitis issues  She takes Lasix p r n  Although has not used it lately  She also complained of having dry cough issues with lisinopril prior  Denies any history of kidney stones in the past   No obvious history of skin rash or joint pain currently  REVIEW OF SYSTEMS:    More than 10 point review of systems were obtained and discussed in length with the patient  Complete review of systems were negative / unremarkable except mentioned in the HPI section  PHYSICAL EXAM:  Vitals:    07/23/20 1117   BP: 144/74   BP Location: Left arm   Patient Position: Sitting   Cuff Size: Standard   Pulse: 74   Resp: 16   Temp: (!) 97 4 °F (36 3 °C)   TempSrc: Tympanic   Weight: 87 1 kg (192 lb)   Height: 5' 4" (1 626 m)     Body mass index is 32 96 kg/m²  Physical Exam   Constitutional: She is oriented to person, place, and time  She appears well-developed and well-nourished  HENT:   Head: Normocephalic and atraumatic  Right Ear: External ear normal    Left Ear: External ear normal    Eyes: Pupils are equal, round, and reactive to light  Conjunctivae and EOM are normal    Neck: Neck supple  No JVD present  Cardiovascular: Normal rate and normal heart sounds  Pulmonary/Chest: Effort normal and breath sounds normal  She has no wheezes  She has no rales  Abdominal: Soft  Bowel sounds are normal  She exhibits no distension  There is no tenderness  Musculoskeletal: She exhibits edema (Trace edema in left leg)  She exhibits no tenderness  Neurological: She is alert and oriented to person, place, and time  Skin: Skin is warm and dry  Recent left leg cellulitis, redness has overall much improved  No tenderness  Psychiatric: She has a normal mood and affect  Her behavior is normal    Vitals reviewed        PAST MEDICAL HISTORY:  Past Medical History:   Diagnosis Date    Arthritis     [facility]rial fibrillation [facility]     Diabetes mellitus [facility]     Diabetes mellitus type 2, uncomplicated ([address] )     Last assessed: 8/17/17    Essential hypertension     Frozen shoulder     L shoulder    GERD (gastroesophageal reflux disease)     Hx of cancer of uterus     Last assessed: 8/21/15    Hyperlipidemia     Hyponatremia 11/16/2016    Lung mass     diagnosed 9/2016    Malignant neoplasm without specification of site ([address] )     Skin cancer, basal cell     right eye area    Stage 4 lung cancer ([address] )        PAST SURGICAL HISTORY:  Past Surgical History:   Procedure Laterality Date    APPENDECTOMY      BRONCHOSCOPY N/A 2016    Procedure: BRONCHOSCOPY FLEXIBLE;  Surgeon: Raul Almonte MD;  [facility];  Service:    Malia Belch CHOLECYSTECTOMY      COLONOSCOPY      GALLBLADDER SURGERY      HYSTERECTOMY      Total abdominal    LARYNGOSCOPY      Flexible Fiberoptic, (Therapeutic), Resolved: 16    MOHS SURGERY      Micrographic Surgery Face    TX BRONCHOSCOPY NEEDLE BX TRACHEA MAIN STEM&/BRON N/A 2016    Procedure: EBUS; FROZEN SECTION ;  Surgeon: Raul Almonte MD;  [facility];  Service: Thoracic    [place] [address] N/A 2017    Procedure: Cordelia Evans;  Surgeon: Zachary Alejandra MD;  Location[facility];   Service: Pulmonary    TONSILECTOMY AND ADNOIDECTOMY      TOTAL KNEE ARTHROPLASTY Right 2014       ALLERGIES:  Allergies   Allergen Reactions    Amoxicillin Rash and Hives    Cardizem [Diltiazem] Rash     Rash      Statins Myalgia     Severe muscle aching  Terrible pains       SOCIAL HISTORY:  Social History     Substance and Sexual Activity   Alcohol Use No     Social History     Substance and Sexual Activity   Drug Use No     Social History     Tobacco Use   Smoking Status Former Smoker    Packs/day: 1 00    Years: 50 00    Pack years: 50 00    Types: Cigarettes    Start date:     Last attempt to quit: 2000    Years since quittin 5   Smokeless Tobacco Never Used   Tobacco Comment    Quit in        FAMILY HISTORY:  Family History   Problem Relation Age of Onset    Heart disease Father         cardiac disorder    Hypertension Father     Arthritis Father     Stroke Father         cerebrovascular accident    Diabetes Mother     Heart disease Mother     Dementia Mother     Hypertension Mother     Thyroid disease Mother     Cancer Maternal Grandfather         of unknown origin    Cancer Family     Depression Family     Diabetes Family     Hyperlipidemia Family         essential    Heart disease Family     Hypertension Family     Stroke Family         syndrome    Lung cancer Paternal Uncle     Muscular dystrophy Brother        MEDICATIONS:    Current Outpatient Medications:     acetaminophen (TYLENOL) 325 mg tablet, Take 650 mg by mouth every 6 (six) hours as needed for mild pain, Disp: , Rfl:     apixaban (Eliquis) 5 mg, TAKE 1 TABLET TWICE A DAY, Disp: 180 tablet, Rfl: 3    Calcium Carb-Cholecalciferol 600-800 MG-UNIT TABS, Take 1 tablet by mouth 2 (two) times a day, Disp: , Rfl:     Cholecalciferol (VITAMIN D) 2000 units CAPS, Take by mouth, Disp: , Rfl:     clindamycin (CLEOCIN) 150 mg capsule, TAKE FOUR CAPSULES BY MOUTH ONE HOUR BEFORE APPOINTMENT, Disp: , Rfl:     cyanocobalamin (VITAMIN B-12) 100 mcg tablet, Take by mouth daily, Disp: , Rfl:     dexamethasone (DECADRON) 4 mg tablet, TAKE ONE TAB TWICE A DAY WITH FOOD, DAY BEFORE,DAY OF,DAY AFTER CHEMO, Disp: 90 tablet, Rfl: 1    Folic Acid 0 8 MG CAPS, , Disp: , Rfl:     furosemide (LASIX) 40 mg tablet, Take 1 tablet (40 mg total) by mouth as needed (leg swelling, fluid overload), Disp: , Rfl:     linaGLIPtin (Tradjenta) 5 MG TABS, Take 5 mg by mouth daily, Disp: 90 tablet, Rfl: 3    metFORMIN (GLUCOPHAGE) 1000 MG tablet, TAKE 1 TABLET TWICE DAILY  WITH MEALS, Disp: 180 tablet, Rfl: 3    metoclopramide (REGLAN) 10 mg tablet, Take 0 5 tablets (5 mg total) by mouth 3 (three) times a day before meals As needed for nausea, Disp: 60 tablet, Rfl: 0    metoprolol tartrate (LOPRESSOR) 25 mg tablet, Take 1 tablet (25 mg total) by mouth every 12 (twelve) hours, Disp: 180 tablet, Rfl: 3    omeprazole (PriLOSEC) 20 mg delayed release capsule, Take 1 capsule (20 mg total) by mouth daily, Disp: 90 capsule, Rfl: 3    ondansetron (ZOFRAN-ODT) 4 mg disintegrating tablet, Take 1 tablet (4 mg total) by mouth every 6 (six) hours as needed for nausea or vomiting, Disp: 30 tablet, Rfl: 3    sotalol (BETAPACE) 80 mg tablet, Take 1 tablet (80 mg total) by mouth 2 (two) times a day 1 tab PO BID, Disp: 180 tablet, Rfl: 3    sulfamethoxazole-trimethoprim (BACTRIM DS) 800-160 mg per tablet, Take 1 tablet by mouth every 12 (twelve) hours for 10 days, Disp: 20 tablet, Rfl: 0    venlafaxine (EFFEXOR) 37 5 mg tablet, Take 1 tablet (37 5 mg total) by mouth daily, Disp: 90 tablet, Rfl: 1    metFORMIN (FORTAMET) 500 MG (OSM) 24 hr tablet, Take 2 tablets (1,000 mg total) by mouth 2 (two) times a day with meals Take in place of old metformin, Disp: 120 tablet, Rfl: 0    nystatin (MYCOSTATIN) cream, Apply topically 2 (two) times a day (Patient not taking: Reported on 7/23/2020), Disp: 30 g, Rfl: 5    potassium chloride (K-DUR,KLOR-CON) 20 mEq tablet, Take 2 tablets (40 mEq total) by mouth daily as needed (low K+) (Patient not taking: Reported on 7/23/2020), Disp: , Rfl:     Lab Results:   Results for orders placed or performed in visit on 06/05/20   CBC and differential   Result Value Ref Range    WBC 4 51 4 31 - 10 16 Thousand/uL    RBC 3 80 (L) 3 81 - 5 12 Million/uL    Hemoglobin 11 2 (L) 11 5 - 15 4 g/dL    Hematocrit 34 9 34 8 - 46 1 %    MCV 92 82 - 98 fL    MCH 29 5 26 8 - 34 3 pg    MCHC 32 1 31 4 - 37 4 g/dL    RDW 12 7 11 6 - 15 1 %    MPV 10 4 8 9 - 12 7 fL    Platelets 371 785 - 986 Thousands/uL    nRBC 0 /100 WBCs    Neutrophils Relative 65 43 - 75 %    Immat GRANS % 0 0 - 2 %    Lymphocytes Relative 23 14 - 44 %    Monocytes Relative 11 4 - 12 %    Eosinophils Relative 1 0 - 6 %    Basophils Relative 0 0 - 1 %    Neutrophils Absolute 2 91 1 85 - 7 62 Thousands/µL    Immature Grans Absolute 0 02 0 00 - 0 20 Thousand/uL    Lymphocytes Absolute 1 03 0 60 - 4 47 Thousands/µL    Monocytes Absolute 0 48 0 17 - 1 22 Thousand/µL    Eosinophils Absolute 0 06 0 00 - 0 61 Thousand/µL    Basophils Absolute 0 01 0 00 - 0 10 Thousands/µL   Comprehensive metabolic panel   Result Value Ref Range    Sodium 140 136 - 145 mmol/L    Potassium 3 9 3 5 - 5 3 mmol/L Chloride 107 100 - 108 mmol/L    CO2 27 21 - 32 mmol/L    ANION GAP 6 4 - 13 mmol/L    BUN 26 (H) 5 - 25 mg/dL    Creatinine 1 63 (H) 0 60 - 1 30 mg/dL    Glucose, Fasting 126 (H) 65 - 99 mg/dL    Calcium 9 2 8 3 - 10 1 mg/dL    AST 29 5 - 45 U/L    ALT 25 12 - 78 U/L    Alkaline Phosphatase 72 46 - 116 U/L    Total Protein 6 3 (L) 6 4 - 8 2 g/dL    Albumin 2 8 (L) 3 5 - 5 0 g/dL    Total Bilirubin 0 27 0 20 - 1 00 mg/dL    eGFR 31 ml/min/1 73sq m       Portions of the record may have been created with voice recognition software  Occasional wrong word or "sound a like" substitutions may have occurred due to the inherent limitations of voice recognition software  Read the chart carefully and recognize, using context, where substitutions have occurred

## 2020-07-23 NOTE — TELEPHONE ENCOUNTER
We did 10 days    I don't want to have it impact her kidneys   Let's just leave it and see what happens

## 2020-07-23 NOTE — TELEPHONE ENCOUNTER
Spoke to patient foot is still discolored not warm looks a little better not as bad as it was  Tomorrow is her last day of the antibiotic no fever  She's not sure if she needs the antibiotic a little longer

## 2020-07-23 NOTE — TELEPHONE ENCOUNTER
PT HAS CELLULITIS AND Friday IS HER LAST DAY FOR ANTIBIOTIC AND SAID FOOT IS STILL SWOLLEN BETTER THO AND A LITTLE PINK   PLS ADVISE

## 2020-07-27 ENCOUNTER — TELEPHONE (OUTPATIENT)
Dept: NEPHROLOGY | Facility: CLINIC | Age: 70
End: 2020-07-27

## 2020-07-27 DIAGNOSIS — N18.30 BENIGN HYPERTENSION WITH CKD (CHRONIC KIDNEY DISEASE) STAGE III (HCC): Primary | ICD-10-CM

## 2020-07-27 DIAGNOSIS — I12.9 BENIGN HYPERTENSION WITH CKD (CHRONIC KIDNEY DISEASE) STAGE III (HCC): Primary | ICD-10-CM

## 2020-07-27 RX ORDER — LOSARTAN POTASSIUM 25 MG/1
25 TABLET ORAL DAILY
Qty: 30 TABLET | Refills: 5 | Status: SHIPPED | OUTPATIENT
Start: 2020-07-27 | End: 2020-11-19 | Stop reason: SDUPTHER

## 2020-07-27 NOTE — TELEPHONE ENCOUNTER
Blood pressure still remains above goal   Please have her start low-dose losartan 25 mg daily  She should have repeat BMP in 1 to 2 weeks after starting losartan  I have prescribed losartan to her pharmacy

## 2020-07-28 ENCOUNTER — TELEPHONE (OUTPATIENT)
Dept: NEPHROLOGY | Facility: CLINIC | Age: 70
End: 2020-07-28

## 2020-07-28 ENCOUNTER — APPOINTMENT (OUTPATIENT)
Dept: LAB | Facility: CLINIC | Age: 70
End: 2020-07-28
Payer: MEDICARE

## 2020-07-28 ENCOUNTER — TELEPHONE (OUTPATIENT)
Dept: HEMATOLOGY ONCOLOGY | Facility: CLINIC | Age: 70
End: 2020-07-28

## 2020-07-28 DIAGNOSIS — C34.92 ADENOCARCINOMA OF LEFT LUNG, STAGE 4 (HCC): Primary | ICD-10-CM

## 2020-07-28 DIAGNOSIS — C34.92 ADENOCARCINOMA OF LEFT LUNG, STAGE 4 (HCC): ICD-10-CM

## 2020-07-28 LAB
BACTERIA UR QL AUTO: ABNORMAL /HPF
BILIRUB UR QL STRIP: NEGATIVE
CLARITY UR: ABNORMAL
COLOR UR: YELLOW
CREAT UR-MCNC: 49.6 MG/DL
GLUCOSE UR STRIP-MCNC: NEGATIVE MG/DL
HGB UR QL STRIP.AUTO: NEGATIVE
KETONES UR STRIP-MCNC: NEGATIVE MG/DL
LEUKOCYTE ESTERASE UR QL STRIP: ABNORMAL
MICROALBUMIN UR-MCNC: 9.6 MG/L (ref 0–20)
MICROALBUMIN/CREAT 24H UR: 19 MG/G CREATININE (ref 0–30)
NITRITE UR QL STRIP: POSITIVE
NON-SQ EPI CELLS URNS QL MICRO: ABNORMAL /HPF
PH UR STRIP.AUTO: 6 [PH]
PROT UR STRIP-MCNC: NEGATIVE MG/DL
RBC #/AREA URNS AUTO: ABNORMAL /HPF
SP GR UR STRIP.AUTO: 1.01 (ref 1–1.03)
UROBILINOGEN UR QL STRIP.AUTO: 0.2 E.U./DL
WBC #/AREA URNS AUTO: ABNORMAL /HPF

## 2020-07-28 PROCEDURE — 82570 ASSAY OF URINE CREATININE: CPT | Performed by: INTERNAL MEDICINE

## 2020-07-28 PROCEDURE — 82043 UR ALBUMIN QUANTITATIVE: CPT | Performed by: INTERNAL MEDICINE

## 2020-07-28 PROCEDURE — 81001 URINALYSIS AUTO W/SCOPE: CPT | Performed by: INTERNAL MEDICINE

## 2020-07-28 RX ORDER — SODIUM CHLORIDE 9 MG/ML
20 INJECTION, SOLUTION INTRAVENOUS ONCE
Status: CANCELLED | OUTPATIENT
Start: 2020-07-29

## 2020-07-28 NOTE — TELEPHONE ENCOUNTER
I called [facility] and inquired about Sushila's creatinine level would take to result and the  stated that they just received the test because it came from another lab  I called and informed Sahyan Rodriguez of this information and let her that as soon as it results I will give her a call  Shayan Rodriguez expressed understanding and appreciation

## 2020-07-28 NOTE — TELEPHONE ENCOUNTER
I spoke to the patient and she is aware her BP is higher than the goal  She will start the Losartan 25mg daily  She will repeat BMP in 1-2 weeks at a [facility]'s lab  She stated she did have a BMP done today as well

## 2020-07-28 NOTE — TELEPHONE ENCOUNTER
Can you let her know creatinine 1 6 slightly increased from prior value although still closer to her recent baseline  Creatinine somewhat fluctuating overall  Continue with repeat BMP in 1 to 2 weeks after starting losartan  Also urine sample shows innumerable bacteria with positive nitrate  Does she have any urinary symptoms like dysuria, urinary frequency/urgency, foul smell in urine, cloudy urine, etc ? If has no symptoms, no need for further antibiotic

## 2020-07-28 NOTE — TELEPHONE ENCOUNTER
Called and spoke to Jazmín Romero and informed her that her creatinine level has not resulted yet  I informed Jazmín Romero that I will check back at around 2:30pm top see if the test resulted and I review it w  Dr Myra Elkins and give her a call back  Jazmín Romero expressed understanding

## 2020-07-28 NOTE — TELEPHONE ENCOUNTER
Reviewed with Dr Zoraida Call and informed [facility] that she is good to go for her tx tmr  Pt expressed understanding

## 2020-07-28 NOTE — TELEPHONE ENCOUNTER
Patient is requesting labs results  She would like to know if she should continue with her infusion for tomorrow

## 2020-07-29 ENCOUNTER — HOSPITAL ENCOUNTER (OUTPATIENT)
Dept: INFUSION CENTER | Facility: CLINIC | Age: 70
Discharge: HOME | End: 2020-07-29
Payer: MEDICARE

## 2020-07-29 VITALS
BODY MASS INDEX: 32.69 KG/M2 | WEIGHT: 191.5 LBS | HEART RATE: 69 BPM | RESPIRATION RATE: 18 BRPM | DIASTOLIC BLOOD PRESSURE: 64 MMHG | HEIGHT: 64 IN | TEMPERATURE: 96.7 F | OXYGEN SATURATION: 99 % | SYSTOLIC BLOOD PRESSURE: 118 MMHG

## 2020-07-29 DIAGNOSIS — C34.92 ADENOCARCINOMA OF LEFT LUNG, STAGE 4 (HCC): Primary | ICD-10-CM

## 2020-07-29 PROCEDURE — 96409 CHEMO IV PUSH SNGL DRUG: CPT

## 2020-07-29 PROCEDURE — 96367 TX/PROPH/DG ADDL SEQ IV INF: CPT

## 2020-07-29 RX ORDER — SODIUM CHLORIDE 9 MG/ML
20 INJECTION, SOLUTION INTRAVENOUS ONCE
Status: COMPLETED | OUTPATIENT
Start: 2020-07-29 | End: 2020-07-29

## 2020-07-29 RX ADMIN — SODIUM CHLORIDE 20 ML/HR: 0.9 INJECTION, SOLUTION INTRAVENOUS at 12:42

## 2020-07-29 RX ADMIN — ONDANSETRON 16 MG: 2 INJECTION INTRAMUSCULAR; INTRAVENOUS at 12:47

## 2020-07-29 RX ADMIN — SODIUM CHLORIDE 600 MG: 9 INJECTION, SOLUTION INTRAVENOUS at 13:30

## 2020-07-29 NOTE — PROGRESS NOTES
Patient tolerated treatment without incident and was discharged post   She offers no c/o and will RTO 8/19/20 for her next dosing

## 2020-07-29 NOTE — PROGRESS NOTES
Patient here for alimta and is doing well, no c/o offered  Noted a dose reduction in alimta, call to [address] [place] [address] to confirm dose reduction specifics  Confirmed patient currently taking dexamethasone as prescribed as well as her folic acid

## 2020-07-29 NOTE — PATIENT INSTRUCTIONS
July 2020 Sunday Monday Tuesday Wednesday Thursday Friday Saturday                  1    INF ONCOLOGY TX-TREATMENT PLAN   8:00 AM   (90 min )   AN INF BED [address]     3     4          [address], Day 1      5     6     7  Happy Birthday!     FOLLOW UP PG   8:25 AM   (40 min )   Kane Lezama MD   [address] [facility] 8     9     10     11                12     13    CT CHEST ABD PELV WO CON  10:00 AM   (30 min )   AN CT 1   [facility] CAT Scan 14     15    OFFICE VISIT SHORT PG   7:45 AM   (15 min )   DO Nanci Cash [facility] 16     17     18                19     20    FOLLOW UP PG   9:05 AM   (20 min )   Rogerio Poe MD   [facility] [place] 21     22     23    CONSULT PG  10:45 AM   (60 min )   Yuri Dugan MD   [facility] 24     25                26     27     28    LAB WALK IN   6:10 AM   (5 min )   AN MOB  [address] MOB 29    INF ONCOLOGY TX-TREATMENT PLAN  12:00 PM   (90 min )   AN INF CHAIR [address] [facility] 30     31               [address] 26, Day 1           Treatment Details       7/1/2020 - Cycle 25, Day 1      Chemotherapy: ONCBCN PROVIDER COMMUNICATION5, PEMETREXED INFUSION    7/29/2020 - Cycle 26, Day 1      Chemotherapy: ONCBCN PROVIDER COMMUNICATION5, PEMETREXED INFUSION        August 2020 Sunday Monday Tuesday Wednesday Thursday Friday Saturday                                 1                2     3     4     5     6     7     8                9     10     11     12     13     14     15          Cycle 27, Day 1      16     17     18     19    INF ONCOLOGY TX-TREATMENT PLAN   1:00 PM   (90 min )   AN INF CHAIR [address]     32     17                54     14     66     10     71     37     76 30     31    FOLLOW UP PG  12:15 PM   (30 min )   Livier Jacobo PA-C   [facility]                                          Treatment Details       8/12/2020 - Cycle 27, Day 1      Chemotherapy: ONCBCN PROVIDER COMMUNICATION5, PEMETREXED INFUSION

## 2020-07-29 NOTE — PROGRESS NOTES
Spoke with Benjamin Castaneda RN regarding patient dose reduction of alimta and she confirmed that based on patient kidney function from yesterday Dr Torin Mujica dose reduced patient to 300mg/m2

## 2020-07-30 NOTE — TELEPHONE ENCOUNTER
I spoke to the patient and she is aware of her lab results, creatinine at 1 6 but closer to her current baseline  She will repeat her BMP in about 2 weeks since starting the Losartan  Patient is not experiencing any burning, frequency, urgency, cloudy urine, fevers  She said should she start to get any symptoms she will call the office

## 2020-08-17 ENCOUNTER — TELEPHONE (OUTPATIENT)
Dept: NEPHROLOGY | Facility: CLINIC | Age: 70
End: 2020-08-17

## 2020-08-17 ENCOUNTER — APPOINTMENT (OUTPATIENT)
Dept: LAB | Facility: CLINIC | Age: 70
End: 2020-08-17
Payer: MEDICARE

## 2020-08-17 ENCOUNTER — TRANSCRIBE ORDERS (OUTPATIENT)
Dept: LAB | Facility: CLINIC | Age: 70
End: 2020-08-17

## 2020-08-17 DIAGNOSIS — I12.9 BENIGN HYPERTENSION WITH CKD (CHRONIC KIDNEY DISEASE) STAGE III (HCC): ICD-10-CM

## 2020-08-17 DIAGNOSIS — N18.30 BENIGN HYPERTENSION WITH CKD (CHRONIC KIDNEY DISEASE) STAGE III (HCC): ICD-10-CM

## 2020-08-18 RX ORDER — SODIUM CHLORIDE 9 MG/ML
20 INJECTION, SOLUTION INTRAVENOUS ONCE
Status: CANCELLED | OUTPATIENT
Start: 2020-08-19

## 2020-08-18 NOTE — TELEPHONE ENCOUNTER
I spoke to the patient and she is aware her creatinine has improved to 1 49   No further questions or concerns at this time

## 2020-08-19 ENCOUNTER — HOSPITAL ENCOUNTER (OUTPATIENT)
Dept: INFUSION CENTER | Facility: CLINIC | Age: 70
Discharge: HOME | End: 2020-08-19
Payer: MEDICARE

## 2020-08-19 VITALS
OXYGEN SATURATION: 98 % | RESPIRATION RATE: 18 BRPM | SYSTOLIC BLOOD PRESSURE: 137 MMHG | DIASTOLIC BLOOD PRESSURE: 63 MMHG | HEART RATE: 78 BPM | TEMPERATURE: 97.6 F | WEIGHT: 195 LBS | HEIGHT: 64 IN | BODY MASS INDEX: 33.29 KG/M2

## 2020-08-19 DIAGNOSIS — C34.92 ADENOCARCINOMA OF LEFT LUNG, STAGE 4 (HCC): Primary | ICD-10-CM

## 2020-08-19 PROCEDURE — 96367 TX/PROPH/DG ADDL SEQ IV INF: CPT

## 2020-08-19 PROCEDURE — 96409 CHEMO IV PUSH SNGL DRUG: CPT

## 2020-08-19 RX ORDER — SODIUM CHLORIDE 9 MG/ML
20 INJECTION, SOLUTION INTRAVENOUS ONCE
Status: COMPLETED | OUTPATIENT
Start: 2020-08-19 | End: 2020-08-19

## 2020-08-19 RX ADMIN — SODIUM CHLORIDE 800 MG: 9 INJECTION, SOLUTION INTRAVENOUS at 14:50

## 2020-08-19 RX ADMIN — ONDANSETRON 16 MG: 2 INJECTION INTRAMUSCULAR; INTRAVENOUS at 14:24

## 2020-08-19 RX ADMIN — SODIUM CHLORIDE 20 ML/HR: 0.9 INJECTION, SOLUTION INTRAVENOUS at 14:15

## 2020-08-19 NOTE — PROGRESS NOTES
Pt arrived to unit without complaints    her labs are within parameters to proceed with special note to her Creat clearance  Pharmacist Angela Manuel did email and have a copy of communication with Dr Odin Daniel [facility] yesterday  Will confirm with Elizabeth VENEGAS/ DR Washington Aguirre we are ok to proceed with Creat Cl at 32 8  And the ordered alimta dose

## 2020-08-19 NOTE — PLAN OF CARE
Problem: Knowledge Deficit  Goal: Patient/family/caregiver demonstrates understanding of disease process, treatment plan, medications, and discharge instructions  Description: Complete learning assessment and assess knowledge base    Interventions:  - Provide teaching at level of understanding  - Provide teaching via preferred learning methods  8/19/2020 1538 by Jori Thapa RN  Outcome: Progressing  8/19/2020 1538 by Jori Thapa RN  Outcome: Progressing

## 2020-08-28 ENCOUNTER — TELEPHONE (OUTPATIENT)
Dept: HEMATOLOGY ONCOLOGY | Facility: CLINIC | Age: 70
End: 2020-08-28

## 2020-08-31 ENCOUNTER — OFFICE VISIT (OUTPATIENT)
Dept: HEMATOLOGY ONCOLOGY | Facility: CLINIC | Age: 70
End: 2020-08-31
Payer: MEDICARE

## 2020-08-31 VITALS
BODY MASS INDEX: 33.55 KG/M2 | SYSTOLIC BLOOD PRESSURE: 121 MMHG | RESPIRATION RATE: 17 BRPM | WEIGHT: 196.5 LBS | DIASTOLIC BLOOD PRESSURE: 62 MMHG | HEIGHT: 64 IN | TEMPERATURE: 99 F | HEART RATE: 62 BPM | OXYGEN SATURATION: 100 %

## 2020-08-31 DIAGNOSIS — C34.92 ADENOCARCINOMA OF LEFT LUNG, STAGE 4 (HCC): ICD-10-CM

## 2020-08-31 DIAGNOSIS — C41.9 MALIGNANT NEOPLASM OF BONE WITH METASTASES (HCC): Primary | ICD-10-CM

## 2020-08-31 DIAGNOSIS — C34.91 MALIGNANT NEOPLASM OF UNSPECIFIED PART OF RIGHT BRONCHUS OR LUNG (HCC): ICD-10-CM

## 2020-08-31 PROCEDURE — 3066F NEPHROPATHY DOC TX: CPT | Performed by: PHYSICIAN ASSISTANT

## 2020-08-31 PROCEDURE — 4040F PNEUMOC VAC/ADMIN/RCVD: CPT | Performed by: PHYSICIAN ASSISTANT

## 2020-08-31 PROCEDURE — 1036F TOBACCO NON-USER: CPT | Performed by: PHYSICIAN ASSISTANT

## 2020-08-31 PROCEDURE — 3044F HG A1C LEVEL LT 7.0%: CPT | Performed by: PHYSICIAN ASSISTANT

## 2020-08-31 PROCEDURE — 3078F DIAST BP <80 MM HG: CPT | Performed by: PHYSICIAN ASSISTANT

## 2020-08-31 PROCEDURE — 3074F SYST BP LT 130 MM HG: CPT | Performed by: PHYSICIAN ASSISTANT

## 2020-08-31 PROCEDURE — 99213 OFFICE O/P EST LOW 20 MIN: CPT | Performed by: PHYSICIAN ASSISTANT

## 2020-08-31 PROCEDURE — 2022F DILAT RTA XM EVC RTNOPTHY: CPT | Performed by: PHYSICIAN ASSISTANT

## 2020-08-31 PROCEDURE — 1160F RVW MEDS BY RX/DR IN RCRD: CPT | Performed by: PHYSICIAN ASSISTANT

## 2020-08-31 PROCEDURE — 3008F BODY MASS INDEX DOCD: CPT | Performed by: PHYSICIAN ASSISTANT

## 2020-08-31 RX ORDER — SODIUM CHLORIDE 9 MG/ML
20 INJECTION, SOLUTION INTRAVENOUS ONCE
Status: CANCELLED | OUTPATIENT
Start: 2020-09-15

## 2020-08-31 NOTE — PROGRESS NOTES
Hematology/Oncology Outpatient Follow- up Note  Jake Griffith 65 y o  female MRN: @ Encounter: [id number]        Date:  8/31/2020      Assessment / Plan:      20-year-old  female with history of stage 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 of the lung with left scapula involvement diagnosed on 08/2016 PDL expression more than 50%, negative for EGFR mutation, ALK  rearrangement, Ros1 mutation     Treated initially with Pembrolizumab complicated with pneumonitis and later on nivolumab with prednisone 10 mg p o  Daily with excellent response      Disease progression in August 2018 in the left scapula with pain no new lesions by PET scan   Status post radiation therapy to the left scapula and treated with Alimta 500 milligram/meter squared, carboplatin AUC 5   After 3 cycles,  CT scan in January 2019 showed stable disease, currently on maintenance Alimta 500 milligram/meter squared every 3 weeks           CT scan 4/2020 showed stable disease in the left upper lobe of the lung , no evidence of new lesions     Alimta dose was reduced to 400 milligram/meter subcutaneously creatinine came down to 1 28      Cycle number 24 on 07/01/2020, CT scan 7/13/20 showed stable disease, stable pancreatic lesion      She continues to follow with nephrology and cardiology                  HPI: Abelino Stafford was admitted to the hospital with arrhythmia and was found to have right lower lobe infiltrate in August 2016   She wasreated with antibiotics however repeat chest x-ray showed persistent right lower lobe infiltrate  Subsequently the patient had a CT scan of the chest which showed a right perihilar mass, subcarinal lymphadenopathy, lytic lesion of the right costovertebral junction at T10 level   PET scan October 2016 showed a right perihilar mass measuring 3 5 cm with SUV of 8 9, subcarinal lymph nodes measuring 3 4 x 2 2 cm with SUV of 9 2  Nodule in the left upper lobe lung measured 2 x 1 1 cm   There was a lytic lesion involving the right 10th costovertebral junction with SUV of 14  4      Biopsy showed non-small cell carcinoma with features suggesting of adenocarcinoma positive for CK 7, CK 19, CA-19-9, ANEUDY-3, partially positive for P40, p63, negative for TTF-1   She had a history of uterine cancer in 2000 status post hysterectomy and did not require radiation or chemotherapy   She is status post bilateral oophorectomy, right knee replacement,   tonsillectomy       She used to smoke for 35 years 1 pack per day however quit smoking 21 years ago   She used hormonal replacement therapy for 3 years  Maritza Ac has a family history significant for skin cancer in her father and coronary artery disease in mother  Maritza Ac has 2 healthy children      Treated initially with Pembrolizumab December 2016, Finished in May 2017 secondary to grade 3 pneumonitis  Initiated on prednisone     Progression: Nivolumab 240 mg flat dose every 2 weeks along with prednisone 10 mg p o  Daily initiated April 2018- October 2018 with excellent response      Liquid biopsy showed K-MADHURI mutation G12C, no evidence of MSI high        Disease progression in August 2018 in the left scapula with pain no new lesions by PET scan   Status post radiation therapy to the left scapula and treated with Alimta 500 milligram/meter squared, carboplatin AUC 5   After 3 cycles,  CT scan in January 2019 showed stable disease  Carbo discontinued 3/2019    Maintenance Alimta 500 milligram/meter squared every 3 weeks initiated 3/2019        Cr 2/20 was 1 5   Plan was to dose reduce Alimta to 400mg/m2; however cr 2 16 2/24/20 and Alimta was held       3/4/20:  renal u/s  Minimal fullness of the left renal collecting system without matthieu hydronephrosis     Chronic right kidney lower pole cortical scar, with adjacent parenchymal calcification measuring 5 mm         She was on prednisone 5 mg p o  daily because of previous history of pneumonitis  CT scan chest 1/3/2020 showed no evidence of infiltration in the lung parenchyma, prednisone was reduced every other day for 1 month and then discontinued        CT scan 4/2020 showed stable disease in the left upper lobe of the lung     CT scan on 07/2020 showed stable disease in the left upper lobe of the lung, pancreatic cyst 2 3 cm, stable from the previous CT scan however bigger from last year CT scan      Chronic LLE edema since fall 1/2020  Interval History:      Cycle #25 delayed x 1 week due to cellulitis of LLE  Cycle #28 due 9/9/20  Test Results:        Labs:   Lab Results   Component Value Date    HGB 10 1 (L) 08/17/2020    HCT 31 7 (L) 08/17/2020    MCV 96 08/17/2020     08/17/2020    WBC 5 51 08/17/2020    NRBC 0 08/17/2020     Lab Results   Component Value Date     11/23/2015    K 4 1 08/17/2020     08/17/2020    CO2 29 08/17/2020    ANIONGAP 9 11/23/2015    BUN 22 08/17/2020    CREATININE 1 49 (H) 08/17/2020    GLUCOSE 149 (H) 11/23/2015    GLUF 124 (H) 08/17/2020    CALCIUM 8 7 08/17/2020    AST 27 08/17/2020    ALT 27 08/17/2020    ALKPHOS 78 08/17/2020    PROT 6 8 11/23/2015    BILITOT 0 65 11/23/2015    EGFR 35 08/17/2020       Imaging: No results found  ROS:  As mentioned in HPI & Interval History otherwise 14 point ROS negative  Allergies: Allergies   Allergen Reactions    Amoxicillin Rash and Hives    Cardizem [Diltiazem] Rash     Rash      Statins Myalgia     Severe muscle aching  Terrible pains     Current Medications: Reviewed  PMH/FH/SH:  Reviewed      Physical Exam:    There is no height or weight on file to calculate BSA      Ht Readings from Last 3 Encounters:   08/19/20 5' 3 5" (1 613 m)   07/29/20 5' 4" (1 626 m)   07/23/20 5' 4" (1 626 m)        Wt Readings from Last 3 Encounters:   08/19/20 88 5 kg (195 lb)   07/29/20 86 9 kg (191 lb 8 oz)   07/23/20 87 1 kg (192 lb)        Temp Readings from Last 3 Encounters:   08/19/20 97 6 °F (36 4 °C) (Temporal)   20 (!) 96 7 °F (35 9 °C) (Temporal)   20 (!) 97 4 °F (36 3 °C) (Tympanic)        BP Readings from Last 3 Encounters:   20 137/63   20 118/64   20 144/74           Physical Exam  Constitutional:       Appearance: She is well-developed  HENT:      Head: Normocephalic and atraumatic  Cardiovascular:      Rate and Rhythm: Normal rate  Pulmonary:      Effort: Pulmonary effort is normal  No respiratory distress  Musculoskeletal:      Left lower leg: Edema (anterior shin, pitting) present  Skin:     General: Skin is warm and dry  Neurological:      Mental Status: She is alert     Psychiatric:         Behavior: Behavior normal          ECO      Emergency Contacts:    Patel Reinoso, ,

## 2020-09-08 ENCOUNTER — TELEPHONE (OUTPATIENT)
Dept: HEMATOLOGY ONCOLOGY | Facility: CLINIC | Age: 70
End: 2020-09-08

## 2020-09-08 ENCOUNTER — APPOINTMENT (OUTPATIENT)
Dept: LAB | Facility: CLINIC | Age: 70
End: 2020-09-08
Payer: MEDICARE

## 2020-09-08 DIAGNOSIS — C34.92 ADENOCARCINOMA OF LEFT LUNG, STAGE 4 (HCC): ICD-10-CM

## 2020-09-08 DIAGNOSIS — C34.92 ADENOCARCINOMA OF LEFT LUNG, STAGE 4 (HCC): Primary | ICD-10-CM

## 2020-09-08 NOTE — TELEPHONE ENCOUNTER
Called patient to inform her that her chemo will be Q28 days now instead of Q21 days  Will have to reschedule appts tomorrow with the infusion center  Patient aware and will not go for chemo tomorrow  She will recheck a cmp next Tuesday

## 2020-09-09 ENCOUNTER — HOSPITAL ENCOUNTER (OUTPATIENT)
Dept: INFUSION CENTER | Facility: CLINIC | Age: 70
End: 2020-09-09

## 2020-09-15 ENCOUNTER — APPOINTMENT (OUTPATIENT)
Dept: LAB | Facility: CLINIC | Age: 70
End: 2020-09-15
Payer: MEDICARE

## 2020-09-15 DIAGNOSIS — C34.92 ADENOCARCINOMA OF LEFT LUNG, STAGE 4 (HCC): ICD-10-CM

## 2020-09-15 LAB
ALBUMIN SERPL BCP-MCNC: 2.8 G/DL (ref 3.5–5)
ALP SERPL-CCNC: 81 U/L (ref 46–116)
ALT SERPL W P-5'-P-CCNC: 17 U/L (ref 12–78)
ANION GAP SERPL CALCULATED.3IONS-SCNC: 7 MMOL/L (ref 4–13)
AST SERPL W P-5'-P-CCNC: 23 U/L (ref 5–45)
BASOPHILS # BLD AUTO: 0.04 THOUSANDS/ΜL (ref 0–0.1)
BASOPHILS NFR BLD AUTO: 1 % (ref 0–1)
BILIRUB SERPL-MCNC: 0.31 MG/DL (ref 0.2–1)
BUN SERPL-MCNC: 24 MG/DL (ref 5–25)
CALCIUM SERPL-MCNC: 9 MG/DL (ref 8.3–10.1)
CHLORIDE SERPL-SCNC: 107 MMOL/L (ref 100–108)
CO2 SERPL-SCNC: 26 MMOL/L (ref 21–32)
CREAT SERPL-MCNC: 1.6 MG/DL (ref 0.6–1.3)
EOSINOPHIL # BLD AUTO: 0.15 THOUSAND/ΜL (ref 0–0.61)
EOSINOPHIL NFR BLD AUTO: 3 % (ref 0–6)
ERYTHROCYTE [DISTWIDTH] IN BLOOD BY AUTOMATED COUNT: 15.2 % (ref 11.6–15.1)
GFR SERPL CREATININE-BSD FRML MDRD: 32 ML/MIN/1.73SQ M
GLUCOSE P FAST SERPL-MCNC: 144 MG/DL (ref 65–99)
HCT VFR BLD AUTO: 33.1 % (ref 34.8–46.1)
HGB BLD-MCNC: 10.7 G/DL (ref 11.5–15.4)
IMM GRANULOCYTES # BLD AUTO: 0.02 THOUSAND/UL (ref 0–0.2)
IMM GRANULOCYTES NFR BLD AUTO: 0 % (ref 0–2)
LYMPHOCYTES # BLD AUTO: 0.77 THOUSANDS/ΜL (ref 0.6–4.47)
LYMPHOCYTES NFR BLD AUTO: 15 % (ref 14–44)
MCH RBC QN AUTO: 31.9 PG (ref 26.8–34.3)
MCHC RBC AUTO-ENTMCNC: 32.3 G/DL (ref 31.4–37.4)
MCV RBC AUTO: 99 FL (ref 82–98)
MONOCYTES # BLD AUTO: 0.54 THOUSAND/ΜL (ref 0.17–1.22)
MONOCYTES NFR BLD AUTO: 10 % (ref 4–12)
NEUTROPHILS # BLD AUTO: 3.66 THOUSANDS/ΜL (ref 1.85–7.62)
NEUTS SEG NFR BLD AUTO: 71 % (ref 43–75)
NRBC BLD AUTO-RTO: 0 /100 WBCS
PLATELET # BLD AUTO: 225 THOUSANDS/UL (ref 149–390)
PMV BLD AUTO: 10.9 FL (ref 8.9–12.7)
POTASSIUM SERPL-SCNC: 4.3 MMOL/L (ref 3.5–5.3)
PROT SERPL-MCNC: 6.5 G/DL (ref 6.4–8.2)
RBC # BLD AUTO: 3.35 MILLION/UL (ref 3.81–5.12)
SODIUM SERPL-SCNC: 140 MMOL/L (ref 136–145)
WBC # BLD AUTO: 5.18 THOUSAND/UL (ref 4.31–10.16)

## 2020-09-15 PROCEDURE — 80053 COMPREHEN METABOLIC PANEL: CPT

## 2020-09-15 PROCEDURE — 36415 COLL VENOUS BLD VENIPUNCTURE: CPT

## 2020-09-15 PROCEDURE — 85025 COMPLETE CBC W/AUTO DIFF WBC: CPT

## 2020-09-16 ENCOUNTER — HOSPITAL ENCOUNTER (OUTPATIENT)
Dept: INFUSION CENTER | Facility: CLINIC | Age: 70
Discharge: HOME | End: 2020-09-16

## 2020-09-16 ENCOUNTER — DOCUMENTATION (OUTPATIENT)
Dept: HEMATOLOGY ONCOLOGY | Facility: CLINIC | Age: 70
End: 2020-09-16

## 2020-09-16 VITALS
OXYGEN SATURATION: 99 % | BODY MASS INDEX: 33.8 KG/M2 | SYSTOLIC BLOOD PRESSURE: 146 MMHG | WEIGHT: 198 LBS | HEIGHT: 64 IN | DIASTOLIC BLOOD PRESSURE: 60 MMHG | RESPIRATION RATE: 16 BRPM | HEART RATE: 75 BPM | TEMPERATURE: 97.8 F

## 2020-09-16 DIAGNOSIS — C34.92 ADENOCARCINOMA OF LEFT LUNG, STAGE 4 (HCC): ICD-10-CM

## 2020-09-16 NOTE — PROGRESS NOTES
Received word through Teams from Lianne Haskins, per Livier Jacobo patients treatment will be canceled today due to decreased creatinine clearance  Patient aware  Patient will return for next cycle in Oct  AVS declined

## 2020-09-16 NOTE — PROGRESS NOTES
Patient arrived for Alimta treatment  Offers no complaints  Based on patients current ht, wt, and creatinine level, her creatinine clearance is 40 8  Call made to Blayne Herron in Dr Echevarria Channel office

## 2020-09-16 NOTE — PROGRESS NOTES
Reviewed with Oneida Soler patients creatine clearance being below parameters at 40 8  Patient is to receive Alimta today  Per Oneida Soler treatment is to be held for today  Notified Nigel Cleveland with infusion

## 2020-09-25 DIAGNOSIS — C34.92 ADENOCARCINOMA OF LEFT LUNG, STAGE 4 (HCC): ICD-10-CM

## 2020-09-25 DIAGNOSIS — R11.0 NAUSEA: ICD-10-CM

## 2020-09-25 DIAGNOSIS — F41.9 ANXIETY: ICD-10-CM

## 2020-09-25 RX ORDER — VENLAFAXINE 37.5 MG/1
TABLET ORAL
Qty: 90 TABLET | Refills: 1 | OUTPATIENT
Start: 2020-09-25

## 2020-09-25 RX ORDER — DEXAMETHASONE 4 MG/1
TABLET ORAL
Qty: 90 TABLET | Refills: 1 | Status: SHIPPED | OUTPATIENT
Start: 2020-09-25 | End: 2021-04-15 | Stop reason: ALTCHOICE

## 2020-09-25 RX ORDER — VENLAFAXINE 37.5 MG/1
37.5 TABLET ORAL DAILY
Qty: 90 TABLET | Refills: 0 | Status: SHIPPED | OUTPATIENT
Start: 2020-09-25 | End: 2020-11-23

## 2020-09-25 RX ORDER — METOCLOPRAMIDE 10 MG/1
TABLET ORAL
Qty: 60 TABLET | Refills: 0 | Status: SHIPPED | OUTPATIENT
Start: 2020-09-25 | End: 2021-06-14

## 2020-10-12 ENCOUNTER — LAB (OUTPATIENT)
Dept: LAB | Facility: AMBULARY SURGERY CENTER | Age: 70
End: 2020-10-12
Payer: MEDICARE

## 2020-10-12 LAB
ALBUMIN SERPL BCP-MCNC: 3.2 G/DL (ref 3.5–5)
ALP SERPL-CCNC: 83 U/L (ref 46–116)
ALT SERPL W P-5'-P-CCNC: 14 U/L (ref 12–78)
ANION GAP SERPL CALCULATED.3IONS-SCNC: 5 MMOL/L (ref 4–13)
AST SERPL W P-5'-P-CCNC: 24 U/L (ref 5–45)
BASOPHILS # BLD AUTO: 0.06 THOUSANDS/ΜL (ref 0–0.1)
BASOPHILS NFR BLD AUTO: 1 % (ref 0–1)
BILIRUB SERPL-MCNC: 0.36 MG/DL (ref 0.2–1)
BUN SERPL-MCNC: 27 MG/DL (ref 5–25)
CALCIUM ALBUM COR SERPL-MCNC: 10.5 MG/DL (ref 8.3–10.1)
CALCIUM SERPL-MCNC: 9.9 MG/DL (ref 8.3–10.1)
CHLORIDE SERPL-SCNC: 110 MMOL/L (ref 100–108)
CO2 SERPL-SCNC: 28 MMOL/L (ref 21–32)
CREAT SERPL-MCNC: 1.55 MG/DL (ref 0.6–1.3)
EOSINOPHIL # BLD AUTO: 0.36 THOUSAND/ΜL (ref 0–0.61)
EOSINOPHIL NFR BLD AUTO: 8 % (ref 0–6)
ERYTHROCYTE [DISTWIDTH] IN BLOOD BY AUTOMATED COUNT: 12.5 % (ref 11.6–15.1)
GFR SERPL CREATININE-BSD FRML MDRD: 33 ML/MIN/1.73SQ M
GLUCOSE P FAST SERPL-MCNC: 127 MG/DL (ref 65–99)
HCT VFR BLD AUTO: 34.9 % (ref 34.8–46.1)
HGB BLD-MCNC: 11.3 G/DL (ref 11.5–15.4)
IMM GRANULOCYTES # BLD AUTO: 0.01 THOUSAND/UL (ref 0–0.2)
IMM GRANULOCYTES NFR BLD AUTO: 0 % (ref 0–2)
LYMPHOCYTES # BLD AUTO: 0.96 THOUSANDS/ΜL (ref 0.6–4.47)
LYMPHOCYTES NFR BLD AUTO: 20 % (ref 14–44)
MCH RBC QN AUTO: 30.5 PG (ref 26.8–34.3)
MCHC RBC AUTO-ENTMCNC: 32.4 G/DL (ref 31.4–37.4)
MCV RBC AUTO: 94 FL (ref 82–98)
MONOCYTES # BLD AUTO: 0.4 THOUSAND/ΜL (ref 0.17–1.22)
MONOCYTES NFR BLD AUTO: 8 % (ref 4–12)
NEUTROPHILS # BLD AUTO: 2.96 THOUSANDS/ΜL (ref 1.85–7.62)
NEUTS SEG NFR BLD AUTO: 63 % (ref 43–75)
NRBC BLD AUTO-RTO: 0 /100 WBCS
PLATELET # BLD AUTO: 204 THOUSANDS/UL (ref 149–390)
PMV BLD AUTO: 11.6 FL (ref 8.9–12.7)
POTASSIUM SERPL-SCNC: 4.3 MMOL/L (ref 3.5–5.3)
PROT SERPL-MCNC: 6.5 G/DL (ref 6.4–8.2)
RBC # BLD AUTO: 3.7 MILLION/UL (ref 3.81–5.12)
SODIUM SERPL-SCNC: 143 MMOL/L (ref 136–145)
WBC # BLD AUTO: 4.75 THOUSAND/UL (ref 4.31–10.16)

## 2020-10-12 PROCEDURE — 85025 COMPLETE CBC W/AUTO DIFF WBC: CPT

## 2020-10-12 PROCEDURE — 80053 COMPREHEN METABOLIC PANEL: CPT

## 2020-10-12 PROCEDURE — 36415 COLL VENOUS BLD VENIPUNCTURE: CPT

## 2020-10-13 ENCOUNTER — OFFICE VISIT (OUTPATIENT)
Dept: HEMATOLOGY ONCOLOGY | Facility: CLINIC | Age: 70
End: 2020-10-13
Payer: MEDICARE

## 2020-10-13 VITALS
RESPIRATION RATE: 16 BRPM | SYSTOLIC BLOOD PRESSURE: 140 MMHG | HEART RATE: 66 BPM | OXYGEN SATURATION: 99 % | WEIGHT: 197.6 LBS | HEIGHT: 64 IN | DIASTOLIC BLOOD PRESSURE: 74 MMHG | BODY MASS INDEX: 33.73 KG/M2 | TEMPERATURE: 96.4 F

## 2020-10-13 DIAGNOSIS — C34.90 MALIGNANT NEOPLASM OF LUNG, UNSPECIFIED LATERALITY, UNSPECIFIED PART OF LUNG (HCC): Primary | ICD-10-CM

## 2020-10-13 PROCEDURE — 99213 OFFICE O/P EST LOW 20 MIN: CPT | Performed by: PHYSICIAN ASSISTANT

## 2020-10-13 RX ORDER — SODIUM CHLORIDE 9 MG/ML
20 INJECTION, SOLUTION INTRAVENOUS ONCE
Status: CANCELLED | OUTPATIENT
Start: 2020-10-13

## 2020-10-14 ENCOUNTER — HOSPITAL ENCOUNTER (OUTPATIENT)
Dept: INFUSION CENTER | Facility: CLINIC | Age: 70
Discharge: HOME | End: 2020-10-14
Payer: MEDICARE

## 2020-10-14 VITALS
HEIGHT: 64 IN | OXYGEN SATURATION: 96 % | TEMPERATURE: 97.4 F | RESPIRATION RATE: 18 BRPM | BODY MASS INDEX: 33.72 KG/M2 | WEIGHT: 197.5 LBS | HEART RATE: 68 BPM | SYSTOLIC BLOOD PRESSURE: 150 MMHG | DIASTOLIC BLOOD PRESSURE: 70 MMHG

## 2020-10-14 DIAGNOSIS — C34.92 ADENOCARCINOMA OF LEFT LUNG, STAGE 4 (HCC): Primary | ICD-10-CM

## 2020-10-14 PROCEDURE — 96372 THER/PROPH/DIAG INJ SC/IM: CPT

## 2020-10-14 PROCEDURE — 96409 CHEMO IV PUSH SNGL DRUG: CPT

## 2020-10-14 PROCEDURE — 96367 TX/PROPH/DG ADDL SEQ IV INF: CPT

## 2020-10-14 RX ORDER — SODIUM CHLORIDE 9 MG/ML
20 INJECTION, SOLUTION INTRAVENOUS ONCE
Status: COMPLETED | OUTPATIENT
Start: 2020-10-14 | End: 2020-10-14

## 2020-10-14 RX ORDER — CYANOCOBALAMIN 1000 UG/ML
1000 INJECTION INTRAMUSCULAR; SUBCUTANEOUS ONCE
Status: COMPLETED | OUTPATIENT
Start: 2020-10-14 | End: 2020-10-14

## 2020-10-14 RX ORDER — CYANOCOBALAMIN 1000 UG/ML
1000 INJECTION INTRAMUSCULAR; SUBCUTANEOUS ONCE
Status: CANCELLED
Start: 2020-10-14

## 2020-10-14 RX ADMIN — ONDANSETRON 16 MG: 2 INJECTION INTRAMUSCULAR; INTRAVENOUS at 11:43

## 2020-10-14 RX ADMIN — SODIUM CHLORIDE 600 MG: 9 INJECTION, SOLUTION INTRAVENOUS at 12:21

## 2020-10-14 RX ADMIN — CYANOCOBALAMIN 1000 MCG: 1000 INJECTION, SOLUTION INTRAMUSCULAR; SUBCUTANEOUS at 12:27

## 2020-10-14 RX ADMIN — SODIUM CHLORIDE 20 ML/HR: 0.9 INJECTION, SOLUTION INTRAVENOUS at 11:42

## 2020-10-20 ENCOUNTER — OFFICE VISIT (OUTPATIENT)
Dept: CARDIOLOGY CLINIC | Facility: CLINIC | Age: 70
End: 2020-10-20
Payer: MEDICARE

## 2020-10-20 VITALS
HEART RATE: 89 BPM | WEIGHT: 193 LBS | BODY MASS INDEX: 32.95 KG/M2 | DIASTOLIC BLOOD PRESSURE: 72 MMHG | SYSTOLIC BLOOD PRESSURE: 126 MMHG | OXYGEN SATURATION: 97 % | HEIGHT: 64 IN

## 2020-10-20 DIAGNOSIS — I48.0 PAROXYSMAL ATRIAL FIBRILLATION (HCC): Primary | ICD-10-CM

## 2020-10-20 DIAGNOSIS — I12.9 BENIGN HYPERTENSION WITH CKD (CHRONIC KIDNEY DISEASE) STAGE III (HCC): ICD-10-CM

## 2020-10-20 DIAGNOSIS — E78.2 MIXED HYPERLIPIDEMIA: ICD-10-CM

## 2020-10-20 DIAGNOSIS — I48.91 ATRIAL FIBRILLATION, UNSPECIFIED TYPE (HCC): ICD-10-CM

## 2020-10-20 DIAGNOSIS — N18.30 BENIGN HYPERTENSION WITH CKD (CHRONIC KIDNEY DISEASE) STAGE III (HCC): ICD-10-CM

## 2020-10-20 PROCEDURE — 99214 OFFICE O/P EST MOD 30 MIN: CPT | Performed by: INTERNAL MEDICINE

## 2020-10-20 PROCEDURE — 93000 ELECTROCARDIOGRAM COMPLETE: CPT | Performed by: INTERNAL MEDICINE

## 2020-10-21 ENCOUNTER — OFFICE VISIT (OUTPATIENT)
Dept: PALLIATIVE MEDICINE | Facility: CLINIC | Age: 70
End: 2020-10-21
Payer: MEDICARE

## 2020-10-21 VITALS
DIASTOLIC BLOOD PRESSURE: 70 MMHG | HEIGHT: 64 IN | TEMPERATURE: 96.6 F | RESPIRATION RATE: 14 BRPM | OXYGEN SATURATION: 98 % | BODY MASS INDEX: 33.12 KG/M2 | SYSTOLIC BLOOD PRESSURE: 130 MMHG | WEIGHT: 194 LBS | HEART RATE: 80 BPM

## 2020-10-21 DIAGNOSIS — G89.3 CANCER RELATED PAIN: Chronic | ICD-10-CM

## 2020-10-21 DIAGNOSIS — I50.32 CHRONIC DIASTOLIC HEART FAILURE (HCC): ICD-10-CM

## 2020-10-21 DIAGNOSIS — G47.00 INSOMNIA, UNSPECIFIED TYPE: ICD-10-CM

## 2020-10-21 DIAGNOSIS — I12.9 BENIGN HYPERTENSION WITH CKD (CHRONIC KIDNEY DISEASE) STAGE III (HCC): ICD-10-CM

## 2020-10-21 DIAGNOSIS — T45.1X5A CINV (CHEMOTHERAPY-INDUCED NAUSEA AND VOMITING): Chronic | ICD-10-CM

## 2020-10-21 DIAGNOSIS — C34.92 ADENOCARCINOMA OF LEFT LUNG, STAGE 4 (HCC): Primary | ICD-10-CM

## 2020-10-21 DIAGNOSIS — F32.5 MAJOR DEPRESSIVE DISORDER WITH SINGLE EPISODE, IN FULL REMISSION (HCC): ICD-10-CM

## 2020-10-21 DIAGNOSIS — R11.2 CINV (CHEMOTHERAPY-INDUCED NAUSEA AND VOMITING): Chronic | ICD-10-CM

## 2020-10-21 DIAGNOSIS — C41.9 MALIGNANT NEOPLASM OF BONE WITH METASTASES (HCC): ICD-10-CM

## 2020-10-21 DIAGNOSIS — K21.9 GASTROESOPHAGEAL REFLUX DISEASE WITHOUT ESOPHAGITIS: ICD-10-CM

## 2020-10-21 DIAGNOSIS — N18.30 BENIGN HYPERTENSION WITH CKD (CHRONIC KIDNEY DISEASE) STAGE III (HCC): ICD-10-CM

## 2020-10-21 DIAGNOSIS — Z51.5 PALLIATIVE CARE PATIENT: ICD-10-CM

## 2020-10-21 DIAGNOSIS — E11.9 TYPE 2 DIABETES MELLITUS WITHOUT COMPLICATION, WITHOUT LONG-TERM CURRENT USE OF INSULIN (HCC): ICD-10-CM

## 2020-10-21 DIAGNOSIS — I48.0 PAROXYSMAL ATRIAL FIBRILLATION (HCC): ICD-10-CM

## 2020-10-21 DIAGNOSIS — F41.9 ANXIETY: ICD-10-CM

## 2020-10-21 PROCEDURE — 99214 OFFICE O/P EST MOD 30 MIN: CPT | Performed by: INTERNAL MEDICINE

## 2020-11-09 ENCOUNTER — LAB (OUTPATIENT)
Dept: LAB | Facility: AMBULARY SURGERY CENTER | Age: 70
End: 2020-11-09
Payer: MEDICARE

## 2020-11-09 DIAGNOSIS — N18.30 STAGE 3 CHRONIC KIDNEY DISEASE (HCC): ICD-10-CM

## 2020-11-09 DIAGNOSIS — C34.92 ADENOCARCINOMA OF LEFT LUNG, STAGE 4 (HCC): Primary | ICD-10-CM

## 2020-11-09 DIAGNOSIS — C34.92 ADENOCARCINOMA OF LEFT LUNG, STAGE 4 (HCC): ICD-10-CM

## 2020-11-09 LAB
CREAT UR-MCNC: 81.2 MG/DL
MAGNESIUM SERPL-MCNC: 1.9 MG/DL (ref 1.6–2.6)
PHOSPHATE SERPL-MCNC: 3.8 MG/DL (ref 2.3–4.1)
PROT UR-MCNC: 25 MG/DL
PROT/CREAT UR: 0.31 MG/G{CREAT} (ref 0–0.1)
PTH-INTACT SERPL-MCNC: 31.4 PG/ML (ref 18.4–80.1)

## 2020-11-09 PROCEDURE — 84100 ASSAY OF PHOSPHORUS: CPT

## 2020-11-09 PROCEDURE — 83970 ASSAY OF PARATHORMONE: CPT

## 2020-11-09 PROCEDURE — 83735 ASSAY OF MAGNESIUM: CPT

## 2020-11-09 PROCEDURE — 84156 ASSAY OF PROTEIN URINE: CPT

## 2020-11-09 PROCEDURE — 82570 ASSAY OF URINE CREATININE: CPT

## 2020-11-09 RX ORDER — SODIUM CHLORIDE 9 MG/ML
20 INJECTION, SOLUTION INTRAVENOUS ONCE
Status: CANCELLED | OUTPATIENT
Start: 2020-11-11

## 2020-11-11 ENCOUNTER — HOSPITAL ENCOUNTER (OUTPATIENT)
Dept: INFUSION CENTER | Facility: CLINIC | Age: 70
Discharge: HOME | End: 2020-11-11
Payer: MEDICARE

## 2020-11-11 VITALS
RESPIRATION RATE: 18 BRPM | SYSTOLIC BLOOD PRESSURE: 138 MMHG | WEIGHT: 198.5 LBS | BODY MASS INDEX: 33.89 KG/M2 | HEART RATE: 67 BPM | HEIGHT: 64 IN | TEMPERATURE: 97 F | OXYGEN SATURATION: 98 % | DIASTOLIC BLOOD PRESSURE: 60 MMHG

## 2020-11-11 DIAGNOSIS — C34.92 ADENOCARCINOMA OF LEFT LUNG, STAGE 4 (HCC): Primary | ICD-10-CM

## 2020-11-11 PROCEDURE — 96367 TX/PROPH/DG ADDL SEQ IV INF: CPT

## 2020-11-11 PROCEDURE — 96409 CHEMO IV PUSH SNGL DRUG: CPT

## 2020-11-11 RX ORDER — SODIUM CHLORIDE 9 MG/ML
20 INJECTION, SOLUTION INTRAVENOUS ONCE
Status: COMPLETED | OUTPATIENT
Start: 2020-11-11 | End: 2020-11-11

## 2020-11-11 RX ADMIN — SODIUM CHLORIDE 20 ML/HR: 0.9 INJECTION, SOLUTION INTRAVENOUS at 10:30

## 2020-11-11 RX ADMIN — ONDANSETRON 16 MG: 2 INJECTION INTRAMUSCULAR; INTRAVENOUS at 10:45

## 2020-11-11 RX ADMIN — SODIUM CHLORIDE 600 MG: 9 INJECTION, SOLUTION INTRAVENOUS at 11:40

## 2020-11-17 ENCOUNTER — HOSPITAL ENCOUNTER (OUTPATIENT)
Dept: CT IMAGING | Facility: HOSPITAL | Age: 70
Discharge: HOME | End: 2020-11-17
Payer: MEDICARE

## 2020-11-17 DIAGNOSIS — C34.90 MALIGNANT NEOPLASM OF LUNG, UNSPECIFIED LATERALITY, UNSPECIFIED PART OF LUNG (HCC): ICD-10-CM

## 2020-11-17 PROCEDURE — 71250 CT THORAX DX C-: CPT

## 2020-11-17 PROCEDURE — G1004 CDSM NDSC: HCPCS

## 2020-11-19 ENCOUNTER — TELEPHONE (OUTPATIENT)
Dept: NEPHROLOGY | Facility: CLINIC | Age: 70
End: 2020-11-19

## 2020-11-19 ENCOUNTER — OFFICE VISIT (OUTPATIENT)
Dept: NEPHROLOGY | Facility: CLINIC | Age: 70
End: 2020-11-19
Payer: MEDICARE

## 2020-11-19 ENCOUNTER — TELEPHONE (OUTPATIENT)
Dept: HEMATOLOGY ONCOLOGY | Facility: CLINIC | Age: 70
End: 2020-11-19

## 2020-11-19 VITALS
HEIGHT: 64 IN | HEART RATE: 97 BPM | WEIGHT: 195 LBS | SYSTOLIC BLOOD PRESSURE: 148 MMHG | DIASTOLIC BLOOD PRESSURE: 78 MMHG | BODY MASS INDEX: 33.29 KG/M2

## 2020-11-19 DIAGNOSIS — I12.9 BENIGN HYPERTENSION WITH CKD (CHRONIC KIDNEY DISEASE) STAGE III (HCC): Primary | ICD-10-CM

## 2020-11-19 DIAGNOSIS — N18.32 STAGE 3B CHRONIC KIDNEY DISEASE (HCC): ICD-10-CM

## 2020-11-19 DIAGNOSIS — D63.1 ANEMIA IN STAGE 3B CHRONIC KIDNEY DISEASE (HCC): ICD-10-CM

## 2020-11-19 DIAGNOSIS — R80.1 PERSISTENT PROTEINURIA: ICD-10-CM

## 2020-11-19 DIAGNOSIS — N18.32 ANEMIA IN STAGE 3B CHRONIC KIDNEY DISEASE (HCC): ICD-10-CM

## 2020-11-19 DIAGNOSIS — I12.9 BENIGN HYPERTENSION WITH CKD (CHRONIC KIDNEY DISEASE) STAGE III (HCC): ICD-10-CM

## 2020-11-19 DIAGNOSIS — N18.30 BENIGN HYPERTENSION WITH CKD (CHRONIC KIDNEY DISEASE) STAGE III (HCC): ICD-10-CM

## 2020-11-19 DIAGNOSIS — E83.52 HYPERCALCEMIA: ICD-10-CM

## 2020-11-19 DIAGNOSIS — N18.30 BENIGN HYPERTENSION WITH CKD (CHRONIC KIDNEY DISEASE) STAGE III (HCC): Primary | ICD-10-CM

## 2020-11-19 PROCEDURE — 99214 OFFICE O/P EST MOD 30 MIN: CPT | Performed by: INTERNAL MEDICINE

## 2020-11-19 RX ORDER — LOSARTAN POTASSIUM 50 MG/1
50 TABLET ORAL DAILY
Qty: 90 TABLET | Refills: 3 | Status: SHIPPED | OUTPATIENT
Start: 2020-11-19 | End: 2020-11-19 | Stop reason: SDUPTHER

## 2020-11-23 DIAGNOSIS — F41.9 ANXIETY: ICD-10-CM

## 2020-11-23 DIAGNOSIS — E11.9 TYPE 2 DIABETES MELLITUS WITHOUT COMPLICATION, WITHOUT LONG-TERM CURRENT USE OF INSULIN (HCC): ICD-10-CM

## 2020-11-23 DIAGNOSIS — R11.0 NAUSEA: ICD-10-CM

## 2020-11-23 RX ORDER — LOSARTAN POTASSIUM 50 MG/1
50 TABLET ORAL DAILY
Qty: 90 TABLET | Refills: 3 | Status: SHIPPED | OUTPATIENT
Start: 2020-11-23 | End: 2020-12-07 | Stop reason: SDUPTHER

## 2020-11-23 RX ORDER — VENLAFAXINE 37.5 MG/1
TABLET ORAL
Qty: 90 TABLET | Refills: 3 | Status: SHIPPED | OUTPATIENT
Start: 2020-11-23 | End: 2021-10-13 | Stop reason: SDUPTHER

## 2020-11-23 RX ORDER — METFORMIN HYDROCHLORIDE EXTENDED-RELEASE TABLETS 500 MG/1
1000 TABLET, FILM COATED, EXTENDED RELEASE ORAL 2 TIMES DAILY WITH MEALS
Qty: 120 TABLET | Refills: 1 | Status: SHIPPED | OUTPATIENT
Start: 2020-11-23 | End: 2020-11-25

## 2020-11-24 ENCOUNTER — OFFICE VISIT (OUTPATIENT)
Dept: HEMATOLOGY ONCOLOGY | Facility: CLINIC | Age: 70
End: 2020-11-24
Payer: MEDICARE

## 2020-11-24 VITALS
TEMPERATURE: 97.2 F | OXYGEN SATURATION: 98 % | SYSTOLIC BLOOD PRESSURE: 142 MMHG | RESPIRATION RATE: 18 BRPM | DIASTOLIC BLOOD PRESSURE: 70 MMHG | BODY MASS INDEX: 33.02 KG/M2 | HEIGHT: 64 IN | HEART RATE: 63 BPM | WEIGHT: 193.4 LBS

## 2020-11-24 DIAGNOSIS — C41.9 MALIGNANT NEOPLASM OF BONE WITH METASTASES (HCC): ICD-10-CM

## 2020-11-24 DIAGNOSIS — C34.91 MALIGNANT NEOPLASM OF UNSPECIFIED PART OF RIGHT BRONCHUS OR LUNG (HCC): Primary | ICD-10-CM

## 2020-11-24 PROCEDURE — 99214 OFFICE O/P EST MOD 30 MIN: CPT | Performed by: INTERNAL MEDICINE

## 2020-11-25 ENCOUNTER — TELEPHONE (OUTPATIENT)
Dept: FAMILY MEDICINE CLINIC | Facility: CLINIC | Age: 70
End: 2020-11-25

## 2020-11-25 DIAGNOSIS — E11.9 CONTROLLED TYPE 2 DIABETES MELLITUS WITHOUT COMPLICATION, WITHOUT LONG-TERM CURRENT USE OF INSULIN (HCC): ICD-10-CM

## 2020-12-04 RX ORDER — SODIUM CHLORIDE 9 MG/ML
20 INJECTION, SOLUTION INTRAVENOUS ONCE
Status: CANCELLED | OUTPATIENT
Start: 2020-12-08

## 2020-12-07 ENCOUNTER — TELEPHONE (OUTPATIENT)
Dept: CARDIAC SURGERY | Facility: CLINIC | Age: 70
End: 2020-12-07

## 2020-12-07 ENCOUNTER — APPOINTMENT (OUTPATIENT)
Dept: LAB | Facility: AMBULARY SURGERY CENTER | Age: 70
End: 2020-12-07
Payer: MEDICARE

## 2020-12-07 DIAGNOSIS — N18.30 BENIGN HYPERTENSION WITH CKD (CHRONIC KIDNEY DISEASE) STAGE III (HCC): ICD-10-CM

## 2020-12-07 DIAGNOSIS — E11.9 CONTROLLED TYPE 2 DIABETES MELLITUS WITHOUT COMPLICATION, WITHOUT LONG-TERM CURRENT USE OF INSULIN (HCC): ICD-10-CM

## 2020-12-07 DIAGNOSIS — C34.92 ADENOCARCINOMA OF LEFT LUNG, STAGE 4 (HCC): ICD-10-CM

## 2020-12-07 DIAGNOSIS — I12.9 BENIGN HYPERTENSION WITH CKD (CHRONIC KIDNEY DISEASE) STAGE III (HCC): ICD-10-CM

## 2020-12-07 LAB
ALBUMIN SERPL BCP-MCNC: 2.9 G/DL (ref 3.5–5)
ALP SERPL-CCNC: 89 U/L (ref 46–116)
ALT SERPL W P-5'-P-CCNC: 17 U/L (ref 12–78)
ANION GAP SERPL CALCULATED.3IONS-SCNC: 7 MMOL/L (ref 4–13)
AST SERPL W P-5'-P-CCNC: 31 U/L (ref 5–45)
BASOPHILS # BLD AUTO: 0.06 THOUSANDS/ΜL (ref 0–0.1)
BASOPHILS NFR BLD AUTO: 1 % (ref 0–1)
BILIRUB SERPL-MCNC: 0.36 MG/DL (ref 0.2–1)
BUN SERPL-MCNC: 30 MG/DL (ref 5–25)
CALCIUM ALBUM COR SERPL-MCNC: 10.6 MG/DL (ref 8.3–10.1)
CALCIUM SERPL-MCNC: 9.7 MG/DL (ref 8.3–10.1)
CHLORIDE SERPL-SCNC: 111 MMOL/L (ref 100–108)
CO2 SERPL-SCNC: 26 MMOL/L (ref 21–32)
CREAT SERPL-MCNC: 1.73 MG/DL (ref 0.6–1.3)
EOSINOPHIL # BLD AUTO: 0.13 THOUSAND/ΜL (ref 0–0.61)
EOSINOPHIL NFR BLD AUTO: 3 % (ref 0–6)
ERYTHROCYTE [DISTWIDTH] IN BLOOD BY AUTOMATED COUNT: 15.5 % (ref 11.6–15.1)
EST. AVERAGE GLUCOSE BLD GHB EST-MCNC: 154 MG/DL
GFR SERPL CREATININE-BSD FRML MDRD: 29 ML/MIN/1.73SQ M
GLUCOSE P FAST SERPL-MCNC: 118 MG/DL (ref 65–99)
HBA1C MFR BLD: 7 %
HCT VFR BLD AUTO: 33.6 % (ref 34.8–46.1)
HGB BLD-MCNC: 10.5 G/DL (ref 11.5–15.4)
IMM GRANULOCYTES # BLD AUTO: 0.01 THOUSAND/UL (ref 0–0.2)
IMM GRANULOCYTES NFR BLD AUTO: 0 % (ref 0–2)
LYMPHOCYTES # BLD AUTO: 1.15 THOUSANDS/ΜL (ref 0.6–4.47)
LYMPHOCYTES NFR BLD AUTO: 24 % (ref 14–44)
MCH RBC QN AUTO: 29.2 PG (ref 26.8–34.3)
MCHC RBC AUTO-ENTMCNC: 31.3 G/DL (ref 31.4–37.4)
MCV RBC AUTO: 93 FL (ref 82–98)
MONOCYTES # BLD AUTO: 0.59 THOUSAND/ΜL (ref 0.17–1.22)
MONOCYTES NFR BLD AUTO: 12 % (ref 4–12)
NEUTROPHILS # BLD AUTO: 2.89 THOUSANDS/ΜL (ref 1.85–7.62)
NEUTS SEG NFR BLD AUTO: 60 % (ref 43–75)
NRBC BLD AUTO-RTO: 0 /100 WBCS
PLATELET # BLD AUTO: 235 THOUSANDS/UL (ref 149–390)
PMV BLD AUTO: 11.8 FL (ref 8.9–12.7)
POTASSIUM SERPL-SCNC: 4.5 MMOL/L (ref 3.5–5.3)
PROT SERPL-MCNC: 6.3 G/DL (ref 6.4–8.2)
RBC # BLD AUTO: 3.6 MILLION/UL (ref 3.81–5.12)
SODIUM SERPL-SCNC: 144 MMOL/L (ref 136–145)
WBC # BLD AUTO: 4.83 THOUSAND/UL (ref 4.31–10.16)

## 2020-12-07 PROCEDURE — 36415 COLL VENOUS BLD VENIPUNCTURE: CPT

## 2020-12-07 PROCEDURE — 85025 COMPLETE CBC W/AUTO DIFF WBC: CPT

## 2020-12-07 PROCEDURE — 83036 HEMOGLOBIN GLYCOSYLATED A1C: CPT

## 2020-12-07 PROCEDURE — 80053 COMPREHEN METABOLIC PANEL: CPT

## 2020-12-07 RX ORDER — LOSARTAN POTASSIUM 50 MG/1
50 TABLET ORAL DAILY
Qty: 90 TABLET | Refills: 3 | Status: SHIPPED | OUTPATIENT
Start: 2020-12-07 | End: 2021-11-24 | Stop reason: SDUPTHER

## 2020-12-08 ENCOUNTER — HOSPITAL ENCOUNTER (OUTPATIENT)
Dept: INFUSION CENTER | Facility: CLINIC | Age: 70
Discharge: HOME | End: 2020-12-08

## 2020-12-10 ENCOUNTER — OFFICE VISIT (OUTPATIENT)
Dept: FAMILY MEDICINE CLINIC | Facility: CLINIC | Age: 70
End: 2020-12-10
Payer: MEDICARE

## 2020-12-10 VITALS
DIASTOLIC BLOOD PRESSURE: 82 MMHG | BODY MASS INDEX: 33.46 KG/M2 | HEIGHT: 64 IN | SYSTOLIC BLOOD PRESSURE: 150 MMHG | WEIGHT: 196 LBS | HEART RATE: 63 BPM | TEMPERATURE: 95.9 F | OXYGEN SATURATION: 100 %

## 2020-12-10 DIAGNOSIS — I50.30 HYPERTENSIVE HEART DISEASE WITH DIASTOLIC CONGESTIVE HEART FAILURE, UNSPECIFIED HF CHRONICITY (HCC): ICD-10-CM

## 2020-12-10 DIAGNOSIS — I12.9 BENIGN HYPERTENSION WITH CKD (CHRONIC KIDNEY DISEASE) STAGE III (HCC): ICD-10-CM

## 2020-12-10 DIAGNOSIS — E11.9 TYPE 2 DIABETES MELLITUS WITHOUT COMPLICATION, WITHOUT LONG-TERM CURRENT USE OF INSULIN (HCC): ICD-10-CM

## 2020-12-10 DIAGNOSIS — Z00.00 MEDICARE ANNUAL WELLNESS VISIT, SUBSEQUENT: Primary | ICD-10-CM

## 2020-12-10 DIAGNOSIS — E78.2 MIXED HYPERLIPIDEMIA: ICD-10-CM

## 2020-12-10 DIAGNOSIS — N18.30 BENIGN HYPERTENSION WITH CKD (CHRONIC KIDNEY DISEASE) STAGE III (HCC): ICD-10-CM

## 2020-12-10 DIAGNOSIS — I11.0 HYPERTENSIVE HEART DISEASE WITH DIASTOLIC CONGESTIVE HEART FAILURE, UNSPECIFIED HF CHRONICITY (HCC): ICD-10-CM

## 2020-12-10 PROBLEM — L03.116 CELLULITIS OF LEFT LOWER EXTREMITY: Status: RESOLVED | Noted: 2020-07-15 | Resolved: 2020-12-10

## 2020-12-10 PROCEDURE — G0439 PPPS, SUBSEQ VISIT: HCPCS | Performed by: FAMILY MEDICINE

## 2020-12-21 ENCOUNTER — LAB (OUTPATIENT)
Dept: LAB | Facility: AMBULARY SURGERY CENTER | Age: 70
End: 2020-12-21
Payer: MEDICARE

## 2020-12-21 DIAGNOSIS — C34.92 ADENOCARCINOMA OF LEFT LUNG, STAGE 4 (HCC): ICD-10-CM

## 2020-12-21 LAB
ALBUMIN SERPL BCP-MCNC: 3 G/DL (ref 3.5–5)
ALP SERPL-CCNC: 84 U/L (ref 46–116)
ALT SERPL W P-5'-P-CCNC: 22 U/L (ref 12–78)
ANION GAP SERPL CALCULATED.3IONS-SCNC: 7 MMOL/L (ref 4–13)
AST SERPL W P-5'-P-CCNC: 31 U/L (ref 5–45)
BASOPHILS # BLD AUTO: 0.05 THOUSANDS/ΜL (ref 0–0.1)
BASOPHILS NFR BLD AUTO: 1 % (ref 0–1)
BILIRUB SERPL-MCNC: 0.5 MG/DL (ref 0.2–1)
BUN SERPL-MCNC: 43 MG/DL (ref 5–25)
CALCIUM ALBUM COR SERPL-MCNC: 10.1 MG/DL (ref 8.3–10.1)
CALCIUM SERPL-MCNC: 9.3 MG/DL (ref 8.3–10.1)
CHLORIDE SERPL-SCNC: 114 MMOL/L (ref 100–108)
CO2 SERPL-SCNC: 24 MMOL/L (ref 21–32)
CREAT SERPL-MCNC: 1.74 MG/DL (ref 0.6–1.3)
EOSINOPHIL # BLD AUTO: 0.08 THOUSAND/ΜL (ref 0–0.61)
EOSINOPHIL NFR BLD AUTO: 1 % (ref 0–6)
ERYTHROCYTE [DISTWIDTH] IN BLOOD BY AUTOMATED COUNT: 15.5 % (ref 11.6–15.1)
GFR SERPL CREATININE-BSD FRML MDRD: 29 ML/MIN/1.73SQ M
GLUCOSE P FAST SERPL-MCNC: 96 MG/DL (ref 65–99)
HCT VFR BLD AUTO: 34.2 % (ref 34.8–46.1)
HGB BLD-MCNC: 10.9 G/DL (ref 11.5–15.4)
IMM GRANULOCYTES # BLD AUTO: 0.02 THOUSAND/UL (ref 0–0.2)
IMM GRANULOCYTES NFR BLD AUTO: 0 % (ref 0–2)
LYMPHOCYTES # BLD AUTO: 1.82 THOUSANDS/ΜL (ref 0.6–4.47)
LYMPHOCYTES NFR BLD AUTO: 22 % (ref 14–44)
MCH RBC QN AUTO: 29.2 PG (ref 26.8–34.3)
MCHC RBC AUTO-ENTMCNC: 31.9 G/DL (ref 31.4–37.4)
MCV RBC AUTO: 92 FL (ref 82–98)
MONOCYTES # BLD AUTO: 0.6 THOUSAND/ΜL (ref 0.17–1.22)
MONOCYTES NFR BLD AUTO: 7 % (ref 4–12)
NEUTROPHILS # BLD AUTO: 5.66 THOUSANDS/ΜL (ref 1.85–7.62)
NEUTS SEG NFR BLD AUTO: 69 % (ref 43–75)
NRBC BLD AUTO-RTO: 0 /100 WBCS
PLATELET # BLD AUTO: 206 THOUSANDS/UL (ref 149–390)
PMV BLD AUTO: 11.4 FL (ref 8.9–12.7)
POTASSIUM SERPL-SCNC: 3.6 MMOL/L (ref 3.5–5.3)
PROT SERPL-MCNC: 6.1 G/DL (ref 6.4–8.2)
RBC # BLD AUTO: 3.73 MILLION/UL (ref 3.81–5.12)
SODIUM SERPL-SCNC: 145 MMOL/L (ref 136–145)
WBC # BLD AUTO: 8.23 THOUSAND/UL (ref 4.31–10.16)

## 2020-12-21 PROCEDURE — 85025 COMPLETE CBC W/AUTO DIFF WBC: CPT

## 2020-12-21 PROCEDURE — 36415 COLL VENOUS BLD VENIPUNCTURE: CPT

## 2020-12-21 PROCEDURE — 80053 COMPREHEN METABOLIC PANEL: CPT

## 2020-12-23 ENCOUNTER — TELEPHONE (OUTPATIENT)
Dept: HEMATOLOGY ONCOLOGY | Facility: CLINIC | Age: 70
End: 2020-12-23

## 2020-12-24 ENCOUNTER — OFFICE VISIT (OUTPATIENT)
Dept: NEPHROLOGY | Facility: CLINIC | Age: 70
End: 2020-12-24
Payer: MEDICARE

## 2020-12-24 VITALS
DIASTOLIC BLOOD PRESSURE: 80 MMHG | SYSTOLIC BLOOD PRESSURE: 152 MMHG | WEIGHT: 196.4 LBS | BODY MASS INDEX: 33.53 KG/M2 | HEIGHT: 64 IN

## 2020-12-24 DIAGNOSIS — R80.1 PERSISTENT PROTEINURIA: ICD-10-CM

## 2020-12-24 DIAGNOSIS — I12.9 BENIGN HYPERTENSION WITH CKD (CHRONIC KIDNEY DISEASE) STAGE III (HCC): Primary | ICD-10-CM

## 2020-12-24 DIAGNOSIS — N18.32 STAGE 3B CHRONIC KIDNEY DISEASE (HCC): ICD-10-CM

## 2020-12-24 DIAGNOSIS — E83.52 HYPERCALCEMIA: ICD-10-CM

## 2020-12-24 DIAGNOSIS — N18.32 ANEMIA IN STAGE 3B CHRONIC KIDNEY DISEASE (HCC): ICD-10-CM

## 2020-12-24 DIAGNOSIS — D63.1 ANEMIA IN STAGE 3B CHRONIC KIDNEY DISEASE (HCC): ICD-10-CM

## 2020-12-24 DIAGNOSIS — N18.30 BENIGN HYPERTENSION WITH CKD (CHRONIC KIDNEY DISEASE) STAGE III (HCC): Primary | ICD-10-CM

## 2020-12-24 PROCEDURE — 99214 OFFICE O/P EST MOD 30 MIN: CPT | Performed by: INTERNAL MEDICINE

## 2020-12-24 RX ORDER — DOXAZOSIN 2 MG/1
2 TABLET ORAL
Qty: 30 TABLET | Refills: 5 | Status: SHIPPED | OUTPATIENT
Start: 2020-12-24 | End: 2021-04-08

## 2020-12-29 RX ORDER — SODIUM CHLORIDE 9 MG/ML
20 INJECTION, SOLUTION INTRAVENOUS ONCE
Status: CANCELLED | OUTPATIENT
Start: 2021-01-05

## 2020-12-29 RX ORDER — CYANOCOBALAMIN 1000 UG/ML
1000 INJECTION INTRAMUSCULAR; SUBCUTANEOUS ONCE
Status: CANCELLED
Start: 2021-01-05

## 2021-01-04 ENCOUNTER — LAB (OUTPATIENT)
Dept: LAB | Facility: AMBULARY SURGERY CENTER | Age: 71
End: 2021-01-04
Payer: MEDICARE

## 2021-01-04 ENCOUNTER — APPOINTMENT (OUTPATIENT)
Dept: LAB | Facility: AMBULARY SURGERY CENTER | Age: 71
End: 2021-01-04
Payer: MEDICARE

## 2021-01-04 DIAGNOSIS — N18.30 BENIGN HYPERTENSION WITH CKD (CHRONIC KIDNEY DISEASE) STAGE III (HCC): ICD-10-CM

## 2021-01-04 DIAGNOSIS — R80.1 PERSISTENT PROTEINURIA: ICD-10-CM

## 2021-01-04 DIAGNOSIS — C34.92 ADENOCARCINOMA OF LEFT LUNG, STAGE 4 (HCC): ICD-10-CM

## 2021-01-04 DIAGNOSIS — I12.9 BENIGN HYPERTENSION WITH CKD (CHRONIC KIDNEY DISEASE) STAGE III (HCC): ICD-10-CM

## 2021-01-04 LAB
ALBUMIN SERPL BCP-MCNC: 3 G/DL (ref 3.5–5)
ALP SERPL-CCNC: 84 U/L (ref 46–116)
ALT SERPL W P-5'-P-CCNC: 18 U/L (ref 12–78)
ANION GAP SERPL CALCULATED.3IONS-SCNC: 4 MMOL/L (ref 4–13)
AST SERPL W P-5'-P-CCNC: 25 U/L (ref 5–45)
BACTERIA UR QL AUTO: ABNORMAL /HPF
BASOPHILS # BLD AUTO: 0.05 THOUSANDS/ΜL (ref 0–0.1)
BASOPHILS NFR BLD AUTO: 1 % (ref 0–1)
BILIRUB SERPL-MCNC: 0.65 MG/DL (ref 0.2–1)
BILIRUB UR QL STRIP: NEGATIVE
BUN SERPL-MCNC: 33 MG/DL (ref 5–25)
CALCIUM ALBUM COR SERPL-MCNC: 10.4 MG/DL (ref 8.3–10.1)
CALCIUM SERPL-MCNC: 9.6 MG/DL (ref 8.3–10.1)
CHLORIDE SERPL-SCNC: 111 MMOL/L (ref 100–108)
CLARITY UR: CLEAR
CO2 SERPL-SCNC: 26 MMOL/L (ref 21–32)
COLOR UR: YELLOW
CREAT SERPL-MCNC: 1.81 MG/DL (ref 0.6–1.3)
EOSINOPHIL # BLD AUTO: 0.29 THOUSAND/ΜL (ref 0–0.61)
EOSINOPHIL NFR BLD AUTO: 6 % (ref 0–6)
ERYTHROCYTE [DISTWIDTH] IN BLOOD BY AUTOMATED COUNT: 14.3 % (ref 11.6–15.1)
GFR SERPL CREATININE-BSD FRML MDRD: 28 ML/MIN/1.73SQ M
GLUCOSE P FAST SERPL-MCNC: 123 MG/DL (ref 65–99)
GLUCOSE UR STRIP-MCNC: NEGATIVE MG/DL
HCT VFR BLD AUTO: 35.9 % (ref 34.8–46.1)
HGB BLD-MCNC: 11.4 G/DL (ref 11.5–15.4)
HGB UR QL STRIP.AUTO: NEGATIVE
HYALINE CASTS #/AREA URNS LPF: ABNORMAL /LPF
IMM GRANULOCYTES # BLD AUTO: 0.02 THOUSAND/UL (ref 0–0.2)
IMM GRANULOCYTES NFR BLD AUTO: 0 % (ref 0–2)
KETONES UR STRIP-MCNC: NEGATIVE MG/DL
LEUKOCYTE ESTERASE UR QL STRIP: ABNORMAL
LYMPHOCYTES # BLD AUTO: 0.67 THOUSANDS/ΜL (ref 0.6–4.47)
LYMPHOCYTES NFR BLD AUTO: 14 % (ref 14–44)
MCH RBC QN AUTO: 28.8 PG (ref 26.8–34.3)
MCHC RBC AUTO-ENTMCNC: 31.8 G/DL (ref 31.4–37.4)
MCV RBC AUTO: 91 FL (ref 82–98)
MONOCYTES # BLD AUTO: 0.48 THOUSAND/ΜL (ref 0.17–1.22)
MONOCYTES NFR BLD AUTO: 10 % (ref 4–12)
NEUTROPHILS # BLD AUTO: 3.32 THOUSANDS/ΜL (ref 1.85–7.62)
NEUTS SEG NFR BLD AUTO: 69 % (ref 43–75)
NITRITE UR QL STRIP: NEGATIVE
NON-SQ EPI CELLS URNS QL MICRO: ABNORMAL /HPF
NRBC BLD AUTO-RTO: 0 /100 WBCS
PH UR STRIP.AUTO: 6 [PH]
PLATELET # BLD AUTO: 184 THOUSANDS/UL (ref 149–390)
PMV BLD AUTO: 11.3 FL (ref 8.9–12.7)
POTASSIUM SERPL-SCNC: 4.3 MMOL/L (ref 3.5–5.3)
PROT SERPL-MCNC: 6.5 G/DL (ref 6.4–8.2)
PROT UR STRIP-MCNC: ABNORMAL MG/DL
RBC # BLD AUTO: 3.96 MILLION/UL (ref 3.81–5.12)
RBC #/AREA URNS AUTO: ABNORMAL /HPF
SODIUM SERPL-SCNC: 141 MMOL/L (ref 136–145)
SP GR UR STRIP.AUTO: 1.02 (ref 1–1.03)
UROBILINOGEN UR QL STRIP.AUTO: 0.2 E.U./DL
WBC # BLD AUTO: 4.83 THOUSAND/UL (ref 4.31–10.16)
WBC #/AREA URNS AUTO: ABNORMAL /HPF

## 2021-01-04 PROCEDURE — 80053 COMPREHEN METABOLIC PANEL: CPT

## 2021-01-04 PROCEDURE — 81001 URINALYSIS AUTO W/SCOPE: CPT

## 2021-01-04 PROCEDURE — 82043 UR ALBUMIN QUANTITATIVE: CPT

## 2021-01-04 PROCEDURE — 82570 ASSAY OF URINE CREATININE: CPT

## 2021-01-04 PROCEDURE — 85025 COMPLETE CBC W/AUTO DIFF WBC: CPT

## 2021-01-04 PROCEDURE — 36415 COLL VENOUS BLD VENIPUNCTURE: CPT

## 2021-01-05 ENCOUNTER — HOSPITAL ENCOUNTER (OUTPATIENT)
Dept: INFUSION CENTER | Facility: CLINIC | Age: 71
Discharge: HOME | End: 2021-01-05
Payer: MEDICARE

## 2021-01-05 VITALS
HEART RATE: 75 BPM | WEIGHT: 197 LBS | RESPIRATION RATE: 18 BRPM | TEMPERATURE: 96.9 F | HEIGHT: 64 IN | OXYGEN SATURATION: 97 % | SYSTOLIC BLOOD PRESSURE: 126 MMHG | DIASTOLIC BLOOD PRESSURE: 62 MMHG | BODY MASS INDEX: 33.63 KG/M2

## 2021-01-05 DIAGNOSIS — C34.92 ADENOCARCINOMA OF LEFT LUNG, STAGE 4 (HCC): Primary | ICD-10-CM

## 2021-01-05 LAB
CREAT UR-MCNC: 66.6 MG/DL
MICROALBUMIN UR-MCNC: 14.4 MG/L (ref 0–20)
MICROALBUMIN/CREAT 24H UR: 22 MG/G CREATININE (ref 0–30)

## 2021-01-05 PROCEDURE — 96372 THER/PROPH/DIAG INJ SC/IM: CPT

## 2021-01-05 PROCEDURE — 96367 TX/PROPH/DG ADDL SEQ IV INF: CPT

## 2021-01-05 PROCEDURE — 96409 CHEMO IV PUSH SNGL DRUG: CPT

## 2021-01-05 RX ORDER — CYANOCOBALAMIN 1000 UG/ML
1000 INJECTION INTRAMUSCULAR; SUBCUTANEOUS ONCE
Status: COMPLETED | OUTPATIENT
Start: 2021-01-05 | End: 2021-01-05

## 2021-01-05 RX ORDER — SODIUM CHLORIDE 9 MG/ML
20 INJECTION, SOLUTION INTRAVENOUS ONCE
Status: COMPLETED | OUTPATIENT
Start: 2021-01-05 | End: 2021-01-05

## 2021-01-05 RX ADMIN — CYANOCOBALAMIN 1000 MCG: 1000 INJECTION, SOLUTION INTRAMUSCULAR; SUBCUTANEOUS at 10:25

## 2021-01-05 RX ADMIN — ONDANSETRON 16 MG: 2 INJECTION INTRAMUSCULAR; INTRAVENOUS at 09:00

## 2021-01-05 RX ADMIN — SODIUM CHLORIDE 600 MG: 9 INJECTION, SOLUTION INTRAVENOUS at 10:05

## 2021-01-05 RX ADMIN — SODIUM CHLORIDE 20 ML/HR: 0.9 INJECTION, SOLUTION INTRAVENOUS at 08:50

## 2021-01-05 NOTE — PROGRESS NOTES
Patient to Angela for Alimta / Vitamin B12: Offers no complaints at present time: Lab work ( 01/04/21 ) reviewed: Creatinine - 1 81: Creatinine Clearance - 27 6: Outside of parameters to treat: Dr Erasmo Harrison office made aware: Spoke to Shu Darling RN: May proceed with treatment today: Right Hand PIV initiated without incident

## 2021-01-05 NOTE — PROGRESS NOTES
Tolerated infusion without incident: No adverse reactions noted: Vitamin B12 per MD order: Injection given in Right UA: Verified follow up appt with patient: AVS offered and declined

## 2021-01-06 ENCOUNTER — TELEPHONE (OUTPATIENT)
Dept: OTHER | Facility: HOSPITAL | Age: 71
End: 2021-01-06

## 2021-01-07 NOTE — TELEPHONE ENCOUNTER
I spoke to the patient she is aware to to call if any symptoms do develop given she is asymptomatic at this time  - Per patient she has not started her Doxazosin as she feels better off without it and would prefer to stay away from any hospitals if she is to get a reaction from it  Blood pressures have been in the 120's- lowest 960/ diastolic 90'A  Finally, She has restarted her chemo

## 2021-01-07 NOTE — TELEPHONE ENCOUNTER
UA shows some signs of infection  Does patient have fever, chills, lower abdominal pain, urinary symptoms like dysuria, urinary frequency/urgency, foul smell in urine, cloudy urine, etc ? If has no symptoms, will monitor without any need for antibiotics

## 2021-01-21 ENCOUNTER — OFFICE VISIT (OUTPATIENT)
Dept: PALLIATIVE MEDICINE | Facility: CLINIC | Age: 71
End: 2021-01-21
Payer: MEDICARE

## 2021-01-21 VITALS
HEART RATE: 60 BPM | SYSTOLIC BLOOD PRESSURE: 128 MMHG | WEIGHT: 195.5 LBS | DIASTOLIC BLOOD PRESSURE: 70 MMHG | TEMPERATURE: 96.3 F | HEIGHT: 64 IN | RESPIRATION RATE: 16 BRPM | OXYGEN SATURATION: 97 % | BODY MASS INDEX: 33.38 KG/M2

## 2021-01-21 DIAGNOSIS — I50.32 CHRONIC DIASTOLIC HEART FAILURE (HCC): ICD-10-CM

## 2021-01-21 DIAGNOSIS — R11.2 CINV (CHEMOTHERAPY-INDUCED NAUSEA AND VOMITING): Chronic | ICD-10-CM

## 2021-01-21 DIAGNOSIS — C79.51 SECONDARY MALIGNANT NEOPLASM OF BONE AND BONE MARROW (HCC): ICD-10-CM

## 2021-01-21 DIAGNOSIS — C34.92 ADENOCARCINOMA OF LEFT LUNG, STAGE 4 (HCC): Primary | ICD-10-CM

## 2021-01-21 DIAGNOSIS — R53.83 OTHER FATIGUE: ICD-10-CM

## 2021-01-21 DIAGNOSIS — T45.1X5A CINV (CHEMOTHERAPY-INDUCED NAUSEA AND VOMITING): Chronic | ICD-10-CM

## 2021-01-21 DIAGNOSIS — F32.5 MAJOR DEPRESSIVE DISORDER WITH SINGLE EPISODE, IN FULL REMISSION (HCC): ICD-10-CM

## 2021-01-21 DIAGNOSIS — C79.52 SECONDARY MALIGNANT NEOPLASM OF BONE AND BONE MARROW (HCC): ICD-10-CM

## 2021-01-21 DIAGNOSIS — K21.9 GASTROESOPHAGEAL REFLUX DISEASE WITHOUT ESOPHAGITIS: ICD-10-CM

## 2021-01-21 DIAGNOSIS — G47.00 INSOMNIA, UNSPECIFIED TYPE: ICD-10-CM

## 2021-01-21 DIAGNOSIS — G89.3 CANCER RELATED PAIN: Chronic | ICD-10-CM

## 2021-01-21 DIAGNOSIS — I48.0 PAROXYSMAL ATRIAL FIBRILLATION (HCC): ICD-10-CM

## 2021-01-21 DIAGNOSIS — E11.9 TYPE 2 DIABETES MELLITUS WITHOUT COMPLICATION, WITHOUT LONG-TERM CURRENT USE OF INSULIN (HCC): ICD-10-CM

## 2021-01-21 DIAGNOSIS — N18.32 STAGE 3B CHRONIC KIDNEY DISEASE (HCC): ICD-10-CM

## 2021-01-21 DIAGNOSIS — I12.9 BENIGN HYPERTENSION WITH CKD (CHRONIC KIDNEY DISEASE) STAGE III (HCC): ICD-10-CM

## 2021-01-21 DIAGNOSIS — Z51.5 PALLIATIVE CARE PATIENT: ICD-10-CM

## 2021-01-21 DIAGNOSIS — N18.30 BENIGN HYPERTENSION WITH CKD (CHRONIC KIDNEY DISEASE) STAGE III (HCC): ICD-10-CM

## 2021-01-21 PROCEDURE — 99214 OFFICE O/P EST MOD 30 MIN: CPT | Performed by: INTERNAL MEDICINE

## 2021-01-21 NOTE — PATIENT INSTRUCTIONS
It was a pleasure to see you again today  Thank you for coming in  · No medication changes today; no refills needed  · I'm glad to see you doing well! · Return in about 3 months  · Call us for refills on medications that we supply, as needed  · If something changes and you need to come in sooner, please call our office  PRESCRIPTION REFILL REMINDER:  All medication refills should be requested prior to [facility] on Friday  Any refill requests after noon on Friday would be addressed the following Monday

## 2021-01-21 NOTE — PROGRESS NOTES
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)    3  Major depressive disorder with single episode, in full remission ([place] [address] )    4  Paroxysmal atrial fibrillation ([place] [address] )    5  Chronic diastolic heart failure ([place] [address] )    6  Benign hypertension with CKD (chronic kidney disease) stage III ([place] [address] )    7  Type 2 diabetes mellitus without complication, without long-term current use of insulin ([place] [address] )    8  Gastroesophageal reflux disease without esophagitis    9  CINV (chemotherapy-induced nausea and vomiting)    10  Insomnia, unspecified type    11  Other fatigue    12  Cancer related pain    13  Palliative care patient    14  Stage 3b chronic kidney disease           Patient is doing well  Her symptoms (CINV, anxiety/depression, insomnia, reflux) are well-managed on her current regimen   o No changes today  o Refills handled prior to today's visit   Patient denies pain   Patient declines offer of medical intervention for frequent/loose stools   Patient endorses 2 weeks of nausea post-chemotherapy  Reglan more effective than Zofran-ODT for her but she makes use of both, PRN   Emotional support provided   Medication safety issues addressed - no driving under the influence of narcotics, watch for adverse effects including AMS and respiratory depression, keep medications stored in a safe/locked environment  Requested Prescriptions      No prescriptions requested or ordered in this encounter       There are no discontinued medications  Representatives have queried the patient's controlled substance dispensing history in the Prescription Drug Monitoring Program in compliance with regulations before I have prescribed any controlled substances  The prescription history is consistent with prescribed therapy and our practice policies        27 minutes were spent face to face with patient with greater than 50% of the time spent in counseling or coordination of care including discussions of symptom assessment and management, medication review, psychosocial support, chart review, imaging review and lab review  All of the patient's questions were answered during this discussion  Return in about 3 months (around 4/21/2021)  SUBJECTIVE:  Chief Complaint   Patient presents with    Follow-up    Cancer    Nausea    Anxiety    Depression    Counseling    Diarrhea        HPI    Gómez Hobson is a 65 y o  female w/ stage IV lung cancer (diagnosed 2016) w/ osseous metastasis s/p chemotherapy + immunotherapy + RT, DM2, HFPEF, pAfib, CKD3, HTN+HLD  She follows w/ Krissy Bonner PA-C + Dr Russ Pandya (Medical Oncology), [facility]  Currently on maintenance therapy w/ Pembrolizumab + PEMEtrexed  Patient is known to [facility] clinic; last seen by me 10/21/20 for symptom management, psychosocial support  Visit today for same  CT chest 11/17/20: "Multiple pulmonary nodular densities again noted  No significant change in the dominant left upper lobe nodule measuring up to 2 7 cm  There is an enlarging groundglass nodule in the left upper lobe laterally which measures 1 3 cm, previously 1 cm, question related to low grade adenocarcinoma versus satellite lesion  New 4 mm nodule in the right lower lobe, metastasis is not excluded  Close imaging follow up is advised  Stable patchy sclerosis of the left scapula likely representing osseous metastasis  Pancreatic cystic lesion at the body measures 2 8 x 2 7 cm, slightly larger  This could be further evaluated with dedicated MRI "    Patient she feels really good  She denies pain  She received an injection in her L knee from Orthopedics on 1/19/21 and her pain is improved  She reports she will not be able to get a knee replacement, unfortunately      Patients mood is stable on her current regimen (Effexor); she reports her mood is positive today  She is able to get restorative slep  Her appetite is good, and her weight is stable  She reports some frequent/loose stools but nothing watery  she states she prefers when her bowels are moving this way (instead of constipation)  She denies nausea today but reports she gets about 2 weeks of nausea post-chemotherapy  Her current PRN regimen is effective; she feels the Reglan is more effective than the Zofran for her but she makes use of both  Patient is content to follow w/ [facility] every 3 months for psychosocial and symptom support  She is looking forward to having many more years w/ her family, and wishes to continue disease-directed therapies  PDMP shows no concerns  The following portions of the medical history were reviewed: past medical history, problem list, medication list, and social history      Current Outpatient Medications:     acetaminophen (TYLENOL) 325 mg tablet, Take 650 mg by mouth every 6 (six) hours as needed for mild pain, Disp: , Rfl:     apixaban (Eliquis) 5 mg, TAKE 1 TABLET TWICE A DAY, Disp: 180 tablet, Rfl: 3    Calcium Carb-Cholecalciferol 600-800 MG-UNIT TABS, Take 1 tablet by mouth every other day, Disp: , Rfl:     Cholecalciferol (VITAMIN D) 2000 units CAPS, Take by mouth, Disp: , Rfl:     cyanocobalamin (VITAMIN B-12) 100 mcg tablet, Take by mouth daily, Disp: , Rfl:     dexamethasone (DECADRON) 4 mg tablet, TAKE 1 TABLET TWICE A DAY WITH FOOD, ON DAY BEFORE CHEMOTHERAPY, DAY OF CHEMOTHERAPY AND DAY AFTER CHEMOTHERAPY, Disp: 90 tablet, Rfl: 1    Folic Acid 0 8 MG CAPS, , Disp: , Rfl:     furosemide (LASIX) 40 mg tablet, Take 1 tablet (40 mg total) by mouth as needed (leg swelling, fluid overload), Disp: , Rfl:     linaGLIPtin (Tradjenta) 5 MG TABS, Take 5 mg by mouth daily, Disp: 90 tablet, Rfl: 3    losartan (COZAAR) 50 mg tablet, Take 1 tablet (50 mg total) by mouth daily, Disp: 90 tablet, Rfl: 3    metFORMIN (GLUCOPHAGE) 1000 MG tablet, Take 1 tablet (1,000 mg total) by mouth 2 (two) times a day with meals, Disp: 180 tablet, Rfl: 3    metoclopramide (REGLAN) 10 mg tablet, TAKE 1/2 TABLET (5MG TOTAL)3 TIMES A DAY BEFORE MEALS AS NEEDED FOR NAUSEA, Disp: 60 tablet, Rfl: 0    metoprolol tartrate (LOPRESSOR) 25 mg tablet, Take 1 tablet (25 mg total) by mouth every 12 (twelve) hours, Disp: 180 tablet, Rfl: 3    omeprazole (PriLOSEC) 20 mg delayed release capsule, Take 1 capsule (20 mg total) by mouth daily, Disp: 90 capsule, Rfl: 3    ondansetron (ZOFRAN-ODT) 4 mg disintegrating tablet, Take 1 tablet (4 mg total) by mouth every 6 (six) hours as needed for nausea or vomiting (Patient taking differently: Take 4 mg by mouth every 6 (six) hours as needed for nausea or vomiting ), Disp: 30 tablet, Rfl: 3    potassium chloride (K-DUR,KLOR-CON) 20 mEq tablet, Take 2 tablets (40 mEq total) by mouth daily as needed (low K+), Disp: , Rfl:     sotalol (BETAPACE) 80 mg tablet, Take 1 tablet (80 mg total) by mouth 2 (two) times a day 1 tab PO BID, Disp: 180 tablet, Rfl: 3    venlafaxine (EFFEXOR) 37 5 mg tablet, TAKE 1 TABLET DAILY, Disp: 90 tablet, Rfl: 3    doxazosin (CARDURA) 2 mg tablet, Take 1 tablet (2 mg total) by mouth daily at bedtime (Patient not taking: Reported on 1/21/2021), Disp: 30 tablet, Rfl: 5    Review of Systems   Constitutional: Negative for activity change, appetite change, fatigue and unexpected weight change  HENT: Negative for trouble swallowing  Respiratory: Negative for shortness of breath  Gastrointestinal: Positive for diarrhea (frequent/loose stools) and nausea (CINV)  Negative for abdominal pain  Endocrine: Negative for polydipsia and polyphagia  Musculoskeletal: Positive for arthralgias (L knee, improved) and joint swelling (L knee)  L knee instability  Allergic/Immunologic: Positive for immunocompromised state  Neurological: Negative for facial asymmetry  Psychiatric/Behavioral: Positive for dysphoric mood (well-managed)  Negative for agitation, behavioral problems, confusion and decreased concentration  The patient is nervous/anxious (well-managed)  All other systems reviewed and are negative  OBJECTIVE:  /70 (BP Location: Right arm, Patient Position: Sitting, Cuff Size: Standard)   Pulse 60   Temp (!) 96 3 °F (35 7 °C) (Tympanic)   Resp 16   Ht 5' 4" (1 626 m)   Wt 88 7 kg (195 lb 8 oz)   LMP  (LMP Unknown)   SpO2 97%   BMI 33 56 kg/m²   Vital signs reviewed  Physical Exam:  Constitutional: Overweight elderly female, Appears well-developed and well-nourished  In no acute physical or emotional distress  Head: Normocephalic and atraumatic  Eyes: EOM are normal  No ocular discharge  No scleral icterus  Neck: No visible adenopathy or masses  Respiratory: Effort normal  No stridor  No respiratory distress  Gastrointestinal: No abdominal distension  Musculoskeletal: No edema  L knee pain improved  Neurological: Alert, oriented and appropriately conversant  No focal deficits  Follows commands appropriately  Skin: No diaphoresis, no rashes seen on exposed areas of skin  Psychiatric: Displays a positive mood and a normal affect  Behavior, judgment and thought content appear normal  Normal insight  Miller Jonas MD  [facility]      Portions of this document may have been created using dictation software and as such some "sound alike" terms may have been generated by the system  Do not hesitate to contact me with any questions or clarifications

## 2021-01-27 RX ORDER — SODIUM CHLORIDE 9 MG/ML
20 INJECTION, SOLUTION INTRAVENOUS ONCE
Status: CANCELLED | OUTPATIENT
Start: 2021-02-02

## 2021-01-29 ENCOUNTER — TELEPHONE (OUTPATIENT)
Dept: HEMATOLOGY ONCOLOGY | Facility: CLINIC | Age: 71
End: 2021-01-29

## 2021-01-29 NOTE — TELEPHONE ENCOUNTER
Spoke with patient advised we will be making appt virtual due to weather, patient requested phone call no virtual

## 2021-01-30 ENCOUNTER — LAB (OUTPATIENT)
Dept: LAB | Facility: CLINIC | Age: 71
End: 2021-01-30
Payer: MEDICARE

## 2021-01-30 DIAGNOSIS — C34.92 ADENOCARCINOMA OF LEFT LUNG, STAGE 4 (HCC): ICD-10-CM

## 2021-01-30 LAB
ALBUMIN SERPL BCP-MCNC: 2.9 G/DL (ref 3.5–5)
ALP SERPL-CCNC: 87 U/L (ref 46–116)
ALT SERPL W P-5'-P-CCNC: 18 U/L (ref 12–78)
ANION GAP SERPL CALCULATED.3IONS-SCNC: 9 MMOL/L (ref 4–13)
AST SERPL W P-5'-P-CCNC: 25 U/L (ref 5–45)
BASOPHILS # BLD AUTO: 0.04 THOUSANDS/ΜL (ref 0–0.1)
BASOPHILS NFR BLD AUTO: 1 % (ref 0–1)
BILIRUB SERPL-MCNC: 0.41 MG/DL (ref 0.2–1)
BUN SERPL-MCNC: 26 MG/DL (ref 5–25)
CALCIUM ALBUM COR SERPL-MCNC: 10.2 MG/DL (ref 8.3–10.1)
CALCIUM SERPL-MCNC: 9.3 MG/DL (ref 8.3–10.1)
CHLORIDE SERPL-SCNC: 106 MMOL/L (ref 100–108)
CO2 SERPL-SCNC: 28 MMOL/L (ref 21–32)
CREAT SERPL-MCNC: 1.81 MG/DL (ref 0.6–1.3)
EOSINOPHIL # BLD AUTO: 0.09 THOUSAND/ΜL (ref 0–0.61)
EOSINOPHIL NFR BLD AUTO: 2 % (ref 0–6)
ERYTHROCYTE [DISTWIDTH] IN BLOOD BY AUTOMATED COUNT: 15.7 % (ref 11.6–15.1)
GFR SERPL CREATININE-BSD FRML MDRD: 28 ML/MIN/1.73SQ M
GLUCOSE P FAST SERPL-MCNC: 128 MG/DL (ref 65–99)
HCT VFR BLD AUTO: 34.2 % (ref 34.8–46.1)
HGB BLD-MCNC: 10.6 G/DL (ref 11.5–15.4)
IMM GRANULOCYTES # BLD AUTO: 0.03 THOUSAND/UL (ref 0–0.2)
IMM GRANULOCYTES NFR BLD AUTO: 1 % (ref 0–2)
LYMPHOCYTES # BLD AUTO: 0.94 THOUSANDS/ΜL (ref 0.6–4.47)
LYMPHOCYTES NFR BLD AUTO: 19 % (ref 14–44)
MCH RBC QN AUTO: 28.3 PG (ref 26.8–34.3)
MCHC RBC AUTO-ENTMCNC: 31 G/DL (ref 31.4–37.4)
MCV RBC AUTO: 91 FL (ref 82–98)
MONOCYTES # BLD AUTO: 0.65 THOUSAND/ΜL (ref 0.17–1.22)
MONOCYTES NFR BLD AUTO: 13 % (ref 4–12)
NEUTROPHILS # BLD AUTO: 3.32 THOUSANDS/ΜL (ref 1.85–7.62)
NEUTS SEG NFR BLD AUTO: 64 % (ref 43–75)
NRBC BLD AUTO-RTO: 0 /100 WBCS
PLATELET # BLD AUTO: 228 THOUSANDS/UL (ref 149–390)
PMV BLD AUTO: 10.9 FL (ref 8.9–12.7)
POTASSIUM SERPL-SCNC: 4.4 MMOL/L (ref 3.5–5.3)
PROT SERPL-MCNC: 6.2 G/DL (ref 6.4–8.2)
RBC # BLD AUTO: 3.74 MILLION/UL (ref 3.81–5.12)
SODIUM SERPL-SCNC: 143 MMOL/L (ref 136–145)
WBC # BLD AUTO: 5.07 THOUSAND/UL (ref 4.31–10.16)

## 2021-01-30 PROCEDURE — 85025 COMPLETE CBC W/AUTO DIFF WBC: CPT

## 2021-01-30 PROCEDURE — 80053 COMPREHEN METABOLIC PANEL: CPT

## 2021-01-30 PROCEDURE — 36415 COLL VENOUS BLD VENIPUNCTURE: CPT

## 2021-02-01 ENCOUNTER — TELEPHONE (OUTPATIENT)
Dept: HEMATOLOGY ONCOLOGY | Facility: CLINIC | Age: 71
End: 2021-02-01

## 2021-02-01 ENCOUNTER — TELEMEDICINE (OUTPATIENT)
Dept: HEMATOLOGY ONCOLOGY | Facility: CLINIC | Age: 71
End: 2021-02-01
Payer: MEDICARE

## 2021-02-01 DIAGNOSIS — C34.92 ADENOCARCINOMA OF LEFT LUNG, STAGE 4 (HCC): Primary | ICD-10-CM

## 2021-02-01 PROCEDURE — 99441 PR PHYS/QHP TELEPHONE EVALUATION 5-10 MIN: CPT | Performed by: PHYSICIAN ASSISTANT

## 2021-02-01 NOTE — TELEPHONE ENCOUNTER
Called patient and provided her with the details of her follow up appointment  Pt's additional CT and TX will be scheduled when outpatient facilities are open

## 2021-02-01 NOTE — PROGRESS NOTES
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 of the lung with left scapula involvement diagnosed on 08/2016 PDL expression more than 50%, negative for EGFR mutation, ALK  rearrangement, Ros1 mutation     Treated initially with Pembrolizumab complicated with pneumonitis and later on nivolumab with prednisone 10 mg p o  Daily with excellent response      Disease progression in August 2018 in the left scapula with pain no new lesions by PET scan   Status post radiation therapy to the left scapula and treated with Alimta 500 milligram/meter squared, carboplatin AUC 5   After 3 cycles,  CT scan in January 2019 showed stable disease, currently on maintenance Alimta 500 milligram/meter squared every 3 weeks           CT scan 4/2020 showed stable disease in the left upper lobe of the lung , no evidence of new lesions     Alimta dose was reduced to 400 milligram/meter IV as creatinine came down to 1 28  Subsequent CT scan showed stable disease on 11/2020, no new lesions     Pemetrexed dose was reduced to 300 milligram/meter squared every 4 weeks  Cycle #33 due 2/2/21  continue treatment and follow-up in 2 months with CBC, CMP prior to each chemotherapy and CT scan every 4 months     She has been having more nausea  Will add Emend to premedications  She will continue with po medications per palliative care  HPI: Babar Guerrero was admitted to the hospital with arrhythmia and was found to have right lower lobe infiltrate in August 2016   She wasreated with antibiotics however repeat chest x-ray showed persistent right lower lobe infiltrate   Subsequently the patient had a CT scan of the chest which showed a right perihilar mass, subcarinal lymphadenopathy, lytic lesion of the right costovertebral junction at T10 level   PET scan October 2016 showed a right perihilar mass measuring 3 5 cm with SUV of 8 9, subcarinal lymph nodes measuring 3 4 x 2 2 cm with SUV of 9 2  Nodule in the left upper lobe lung measured 2 x 1 1 cm   There was a lytic lesion involving the right 10th costovertebral junction with SUV of 14  4      Biopsy showed non-small cell carcinoma with features suggesting of adenocarcinoma positive for CK 7, CK 19, CA-19-9, ANEUDY-3, partially positive for P40, p63, negative for TTF-1   She had a history of uterine cancer in 2000 status post hysterectomy and did not require radiation or chemotherapy   She is status post bilateral oophorectomy, right knee replacement,   tonsillectomy       She used to smoke for 35 years 1 pack per day however quit smoking 21 years ago   She used hormonal replacement therapy for 3 years  Marcellus Levin has a family history significant for skin cancer in her father and coronary artery disease in mother  Marcellus Levin has 2 healthy children      Treated initially with Pembrolizumab December 2016, Finished in May 2017 secondary to grade 3 pneumonitis  Initiated on prednisone     Progression: Nivolumab 240 mg flat dose every 2 weeks along with prednisone 10 mg p o  Daily initiated April 2018- October 2018 with excellent response      Liquid biopsy showed K-MADHURI mutation G12C, no evidence of MSI high        Disease progression in August 2018 in the left scapula with pain no new lesions by PET scan   Status post radiation therapy to the left scapula and treated with Alimta 500 milligram/meter squared, carboplatin AUC 5   After 3 cycles,  CT scan in January 2019 showed stable disease  Carbo discontinued 3/2019    Maintenance Alimta 500 milligram/meter squared every 3 weeks initiated 3/2019        Cr 2/20 was 1 5   Plan was to dose reduce Alimta to 400mg/m2; however cr 2 16 2/24/20 and Alimta was held       3/4/20:  renal u/s  Minimal fullness of the left renal collecting system without matthieu hydronephrosis     Chronic right kidney lower pole cortical scar, with adjacent parenchymal calcification measuring 5 mm       She was on prednisone 5 mg p o  daily because of previous history of pneumonitis  CT scan chest 1/3/2020 showed no evidence of infiltration in the lung parenchyma, prednisone was reduced every other day for 1 month and then discontinued        CT scan 4/2020 showed stable disease in the left upper lobe of the lung     CT scan on 07/2020 showed stable disease in the left upper lobe of the lung, pancreatic cyst 2 3 cm, stable from the previous CT scan however bigger from last year CT scan       Chronic LLE edema since fall she had 2/2020    Venous doppler negative for clot  She has been feeling well  More nausea post chemotherapy for 2 weeks             Encounter provider Jhoana Atkinson PA-C    Provider located at [facility] [address] [id number]    Recent Visits  Date Type Provider Dept   01/29/21 Telephone Kamlesh Mora [facility]   Showing recent visits within past 7 days and meeting all other requirements     Today's Visits  Date Type Provider Dept   02/01/21 Telemedicine Jhoana Atkinson PA-C [facility]   Showing today's visits and meeting all other requirements     Future Appointments  No visits were found meeting these conditions  Showing future appointments within next 150 days and meeting all other requirements        After connecting through phone and patient was informed that this is not a secure, HIPAA-compliant platform  She agrees to proceed  , the patient was identified by name and date of birth  Earle Bojorquez was informed that this is a telemedicine visit and that the visit is being conducted throughMy office door was closed  No one else was in the room  She acknowledged consent and understanding of privacy and security of the platform  The patient has agreed to participate and understands she can discontinue the visit at any time  Patient is aware this is a billable service  Past Medical History:   Diagnosis Date    Arthritis     Atrial fibrillation ([facility] )     Diabetes mellitus ([facility] )     Diabetes mellitus type 2, uncomplicated (HCC)     Last assessed: 8/17/17    Essential hypertension     Frozen shoulder     L shoulder    GERD (gastroesophageal reflux disease)     Hx of cancer of uterus     Last assessed: 8/21/15    Hyperlipidemia     Hyponatremia 11/16/2016    Lung mass     diagnosed 9/2016    Malignant neoplasm without specification of site ([facility] )     Skin cancer, basal cell     right eye area    Stage 4 lung cancer ([facility] )        Past Surgical History:   Procedure Laterality Date    APPENDECTOMY      BRONCHOSCOPY N/A 11/17/2016    Procedure: BRONCHOSCOPY FLEXIBLE;  Surgeon: Becky Bennett MD;  [facility]: [facility];  Service:    Todd Hitchcock CHOLECYSTECTOMY      COLONOSCOPY      GALLBLADDER SURGERY      HYSTERECTOMY  2000    Total abdominal    LARYNGOSCOPY      Flexible Fiberoptic, (Therapeutic), Resolved: 11/17/16    MOHS SURGERY      Micrographic Surgery Face    CO BRONCHOSCOPY NEEDLE BX TRACHEA MAIN STEM&/BRON N/A 11/17/2016    Procedure: EBUS; FROZEN SECTION ;  Surgeon: Becky Bennett MD;  [facility];  Service: Thoracic    CO [address] N/A 5/24/2017    Procedure: BRONCHOSCOPY FLEXIBLE;  Surgeon: Michaela Carmona MD;  Location: [facility];   Service: Pulmonary    TONSILECTOMY AND ADNOIDECTOMY      TOTAL KNEE ARTHROPLASTY Right 09/23/2014       Current Outpatient Medications   Medication Sig Dispense Refill    acetaminophen (TYLENOL) 325 mg tablet Take 650 mg by mouth every 6 (six) hours as needed for mild pain      apixaban (Eliquis) 5 mg TAKE 1 TABLET TWICE A  tablet 3    Calcium Carb-Cholecalciferol 600-800 MG-UNIT TABS Take 1 tablet by mouth every other day      Cholecalciferol (VITAMIN D) 2000 units CAPS Take by mouth      cyanocobalamin (VITAMIN B-12) 100 mcg tablet Take by mouth daily      dexamethasone (DECADRON) 4 mg tablet TAKE 1 TABLET TWICE A DAY WITH FOOD, ON DAY BEFORE CHEMOTHERAPY, DAY OF CHEMOTHERAPY AND DAY AFTER CHEMOTHERAPY 90 tablet 1    doxazosin (CARDURA) 2 mg tablet Take 1 tablet (2 mg total) by mouth daily at bedtime (Patient not taking: Reported on 1/21/2021) 30 tablet 5    Folic Acid 0 8 MG CAPS       furosemide (LASIX) 40 mg tablet Take 1 tablet (40 mg total) by mouth as needed (leg swelling, fluid overload)      linaGLIPtin (Tradjenta) 5 MG TABS Take 5 mg by mouth daily 90 tablet 3    losartan (COZAAR) 50 mg tablet Take 1 tablet (50 mg total) by mouth daily 90 tablet 3    metFORMIN (GLUCOPHAGE) 1000 MG tablet Take 1 tablet (1,000 mg total) by mouth 2 (two) times a day with meals 180 tablet 3    metoclopramide (REGLAN) 10 mg tablet TAKE 1/2 TABLET (5MG TOTAL)3 TIMES A DAY BEFORE MEALS AS NEEDED FOR NAUSEA 60 tablet 0    metoprolol tartrate (LOPRESSOR) 25 mg tablet Take 1 tablet (25 mg total) by mouth every 12 (twelve) hours 180 tablet 3    omeprazole (PriLOSEC) 20 mg delayed release capsule Take 1 capsule (20 mg total) by mouth daily 90 capsule 3    ondansetron (ZOFRAN-ODT) 4 mg disintegrating tablet Take 1 tablet (4 mg total) by mouth every 6 (six) hours as needed for nausea or vomiting (Patient taking differently: Take 4 mg by mouth every 6 (six) hours as needed for nausea or vomiting ) 30 tablet 3    potassium chloride (K-DUR,KLOR-CON) 20 mEq tablet Take 2 tablets (40 mEq total) by mouth daily as needed (low K+)      sotalol (BETAPACE) 80 mg tablet Take 1 tablet (80 mg total) by mouth 2 (two) times a day 1 tab PO  tablet 3    venlafaxine (EFFEXOR) 37 5 mg tablet TAKE 1 TABLET DAILY 90 tablet 3     No current facility-administered medications for this visit  Allergies   Allergen Reactions    Amoxicillin Rash and Hives    Cardizem [Diltiazem] Rash     Rash      Statins Myalgia     Severe muscle aching  Terrible pains       Review of Systems    There were no vitals filed for this visit        I spent 5 minutes with patient today in which greater than 50% of the time was spent in counseling/coordination of care regarding lung cancer    VIRTUAL VISIT DISCLAIMER    Juan Jose acknowledges that she has consented to an online visit or consultation  She understands that the online visit is based solely on information provided by her, and that, in the absence of a face-to-face physical evaluation by the physician, the diagnosis she receives is both limited and provisional in terms of accuracy and completeness  This is not intended to replace a full medical face-to-face evaluation by the physician  Juan Damon understands and accepts these terms

## 2021-02-02 ENCOUNTER — HOSPITAL ENCOUNTER (OUTPATIENT)
Dept: INFUSION CENTER | Facility: CLINIC | Age: 71
Discharge: HOME | End: 2021-02-02
Payer: MEDICARE

## 2021-02-02 ENCOUNTER — TELEPHONE (OUTPATIENT)
Dept: HEMATOLOGY ONCOLOGY | Facility: CLINIC | Age: 71
End: 2021-02-02

## 2021-02-02 VITALS
DIASTOLIC BLOOD PRESSURE: 56 MMHG | WEIGHT: 198.85 LBS | HEART RATE: 67 BPM | OXYGEN SATURATION: 98 % | TEMPERATURE: 96.7 F | RESPIRATION RATE: 20 BRPM | HEIGHT: 64 IN | SYSTOLIC BLOOD PRESSURE: 113 MMHG | BODY MASS INDEX: 33.95 KG/M2

## 2021-02-02 DIAGNOSIS — T45.1X5A CINV (CHEMOTHERAPY-INDUCED NAUSEA AND VOMITING): ICD-10-CM

## 2021-02-02 DIAGNOSIS — R11.2 CINV (CHEMOTHERAPY-INDUCED NAUSEA AND VOMITING): ICD-10-CM

## 2021-02-02 DIAGNOSIS — C34.92 ADENOCARCINOMA OF LEFT LUNG, STAGE 4 (HCC): Primary | ICD-10-CM

## 2021-02-02 PROCEDURE — 96409 CHEMO IV PUSH SNGL DRUG: CPT

## 2021-02-02 PROCEDURE — 96367 TX/PROPH/DG ADDL SEQ IV INF: CPT

## 2021-02-02 RX ORDER — SODIUM CHLORIDE 9 MG/ML
20 INJECTION, SOLUTION INTRAVENOUS ONCE
Status: COMPLETED | OUTPATIENT
Start: 2021-02-02 | End: 2021-02-02

## 2021-02-02 RX ADMIN — SODIUM CHLORIDE 600 MG: 9 INJECTION, SOLUTION INTRAVENOUS at 16:53

## 2021-02-02 RX ADMIN — FOSAPREPITANT 150 MG: 150 INJECTION, POWDER, LYOPHILIZED, FOR SOLUTION INTRAVENOUS at 16:14

## 2021-02-02 RX ADMIN — SODIUM CHLORIDE 20 ML/HR: 0.9 INJECTION, SOLUTION INTRAVENOUS at 15:41

## 2021-02-02 RX ADMIN — ONDANSETRON 16 MG: 2 INJECTION INTRAMUSCULAR; INTRAVENOUS at 15:54

## 2021-02-02 NOTE — PROGRESS NOTES
Patient here for maintenance alimta and is doing well, no c/o offered, no changes reported  Medications the same, folic acid and decadron prescribed  Confirmed a creatinine clearance of 27 7 with both Mc Uribe and Gianni Amaya, received an ok to proceed with treatment today

## 2021-02-02 NOTE — TELEPHONE ENCOUNTER
Provided patient with the time and date of her CT Scan  Patient will schedule her next Infusion treatment in office today

## 2021-02-02 NOTE — PROGRESS NOTES
Patient tolerated treatment without incident and was discharged post   She offers no c/o and will RTO for same 3/2/21

## 2021-02-02 NOTE — PATIENT INSTRUCTIONS
February 2021 Sunday Monday Tuesday Wednesday Thursday Friday Saturday        1    VIRTUAL VISIT PG   8:45 AM   (30 min )   Bridger Gillespie PA-C   [facility] [place] [facility]    INF ONCOLOGY TX-TREATMENT PLAN   3:00 PM   (90 min )   AN INF BED 6   [address]  [address] [place] [address] [address]     4     5     6         [address], Day 1       7     8     9     10     11     12     13                14     15     16     17     18     19     20                21     22    CT CHEST WO CON   6:00 PM   (30 min )   AN CT 1   [facility] CAT Scan 23     24     25     26     27                28                                                   Treatment Details       2/2/2021 - Cycle 33, Day 1      Chemotherapy: ONCBCN PROVIDER COMMUNICATION5, PEMETREXED INFUSION

## 2021-02-03 DIAGNOSIS — C34.92 ADENOCARCINOMA OF LEFT LUNG, STAGE 4 (HCC): Primary | ICD-10-CM

## 2021-02-13 DIAGNOSIS — Z23 ENCOUNTER FOR IMMUNIZATION: ICD-10-CM

## 2021-02-20 ENCOUNTER — IMMUNIZATIONS (OUTPATIENT)
Dept: FAMILY MEDICINE CLINIC | Facility: HOSPITAL | Age: 71
End: 2021-02-20

## 2021-02-20 DIAGNOSIS — Z23 ENCOUNTER FOR IMMUNIZATION: Primary | ICD-10-CM

## 2021-02-20 PROCEDURE — 91300 SARS-COV-2 / COVID-19 MRNA VACCINE (PFIZER-BIONTECH) 30 MCG: CPT

## 2021-02-20 PROCEDURE — 0001A SARS-COV-2 / COVID-19 MRNA VACCINE (PFIZER-BIONTECH) 30 MCG: CPT

## 2021-02-22 ENCOUNTER — HOSPITAL ENCOUNTER (OUTPATIENT)
Dept: CT IMAGING | Facility: HOSPITAL | Age: 71
Discharge: HOME | End: 2021-02-22
Payer: MEDICARE

## 2021-02-22 DIAGNOSIS — C34.92 ADENOCARCINOMA OF LEFT LUNG, STAGE 4 (HCC): ICD-10-CM

## 2021-02-22 PROCEDURE — G1004 CDSM NDSC: HCPCS

## 2021-02-22 PROCEDURE — 71250 CT THORAX DX C-: CPT

## 2021-02-23 RX ORDER — SODIUM CHLORIDE 9 MG/ML
20 INJECTION, SOLUTION INTRAVENOUS ONCE
Status: CANCELLED | OUTPATIENT
Start: 2021-03-02

## 2021-02-26 ENCOUNTER — TELEPHONE (OUTPATIENT)
Dept: HEMATOLOGY ONCOLOGY | Facility: CLINIC | Age: 71
End: 2021-02-26

## 2021-02-26 NOTE — TELEPHONE ENCOUNTER
Spoke with patient reminding to have labs done prior to appt, patient stated she will go Monday morning

## 2021-03-01 ENCOUNTER — TELEPHONE (OUTPATIENT)
Dept: HEMATOLOGY ONCOLOGY | Facility: CLINIC | Age: 71
End: 2021-03-01

## 2021-03-01 ENCOUNTER — PATIENT OUTREACH (OUTPATIENT)
Dept: HEMATOLOGY ONCOLOGY | Facility: CLINIC | Age: 71
End: 2021-03-01

## 2021-03-01 ENCOUNTER — LAB (OUTPATIENT)
Dept: LAB | Facility: AMBULARY SURGERY CENTER | Age: 71
End: 2021-03-01
Payer: MEDICARE

## 2021-03-01 ENCOUNTER — OFFICE VISIT (OUTPATIENT)
Dept: HEMATOLOGY ONCOLOGY | Facility: CLINIC | Age: 71
End: 2021-03-01
Payer: MEDICARE

## 2021-03-01 VITALS
HEIGHT: 64 IN | TEMPERATURE: 97.4 F | RESPIRATION RATE: 16 BRPM | WEIGHT: 198.5 LBS | SYSTOLIC BLOOD PRESSURE: 130 MMHG | DIASTOLIC BLOOD PRESSURE: 76 MMHG | BODY MASS INDEX: 33.89 KG/M2 | HEART RATE: 72 BPM | OXYGEN SATURATION: 98 %

## 2021-03-01 DIAGNOSIS — C41.9 MALIGNANT NEOPLASM OF BONE WITH METASTASES (HCC): Primary | ICD-10-CM

## 2021-03-01 DIAGNOSIS — C34.90 MALIGNANT NEOPLASM OF LUNG, UNSPECIFIED LATERALITY, UNSPECIFIED PART OF LUNG (HCC): Primary | ICD-10-CM

## 2021-03-01 DIAGNOSIS — C34.92 ADENOCARCINOMA OF LEFT LUNG, STAGE 4 (HCC): ICD-10-CM

## 2021-03-01 DIAGNOSIS — C34.12 MALIGNANT NEOPLASM OF UPPER LOBE, LEFT BRONCHUS OR LUNG (HCC): ICD-10-CM

## 2021-03-01 LAB
ALBUMIN SERPL BCP-MCNC: 3.1 G/DL (ref 3.5–5)
ALP SERPL-CCNC: 95 U/L (ref 46–116)
ALT SERPL W P-5'-P-CCNC: 17 U/L (ref 12–78)
ANION GAP SERPL CALCULATED.3IONS-SCNC: 3 MMOL/L (ref 4–13)
AST SERPL W P-5'-P-CCNC: 31 U/L (ref 5–45)
BASOPHILS # BLD AUTO: 0.05 THOUSANDS/ΜL (ref 0–0.1)
BASOPHILS NFR BLD AUTO: 1 % (ref 0–1)
BILIRUB SERPL-MCNC: 0.36 MG/DL (ref 0.2–1)
BUN SERPL-MCNC: 26 MG/DL (ref 5–25)
CALCIUM ALBUM COR SERPL-MCNC: 10.4 MG/DL (ref 8.3–10.1)
CALCIUM SERPL-MCNC: 9.7 MG/DL (ref 8.3–10.1)
CHLORIDE SERPL-SCNC: 112 MMOL/L (ref 100–108)
CO2 SERPL-SCNC: 28 MMOL/L (ref 21–32)
CREAT SERPL-MCNC: 2.02 MG/DL (ref 0.6–1.3)
EOSINOPHIL # BLD AUTO: 0.2 THOUSAND/ΜL (ref 0–0.61)
EOSINOPHIL NFR BLD AUTO: 4 % (ref 0–6)
ERYTHROCYTE [DISTWIDTH] IN BLOOD BY AUTOMATED COUNT: 15.4 % (ref 11.6–15.1)
GFR SERPL CREATININE-BSD FRML MDRD: 24 ML/MIN/1.73SQ M
GLUCOSE P FAST SERPL-MCNC: 119 MG/DL (ref 65–99)
HCT VFR BLD AUTO: 34 % (ref 34.8–46.1)
HGB BLD-MCNC: 10.7 G/DL (ref 11.5–15.4)
IMM GRANULOCYTES # BLD AUTO: 0.02 THOUSAND/UL (ref 0–0.2)
IMM GRANULOCYTES NFR BLD AUTO: 0 % (ref 0–2)
LYMPHOCYTES # BLD AUTO: 0.91 THOUSANDS/ΜL (ref 0.6–4.47)
LYMPHOCYTES NFR BLD AUTO: 18 % (ref 14–44)
MCH RBC QN AUTO: 29.6 PG (ref 26.8–34.3)
MCHC RBC AUTO-ENTMCNC: 31.5 G/DL (ref 31.4–37.4)
MCV RBC AUTO: 94 FL (ref 82–98)
MONOCYTES # BLD AUTO: 0.53 THOUSAND/ΜL (ref 0.17–1.22)
MONOCYTES NFR BLD AUTO: 11 % (ref 4–12)
NEUTROPHILS # BLD AUTO: 3.31 THOUSANDS/ΜL (ref 1.85–7.62)
NEUTS SEG NFR BLD AUTO: 66 % (ref 43–75)
NRBC BLD AUTO-RTO: 0 /100 WBCS
PLATELET # BLD AUTO: 239 THOUSANDS/UL (ref 149–390)
PMV BLD AUTO: 11.7 FL (ref 8.9–12.7)
POTASSIUM SERPL-SCNC: 4.5 MMOL/L (ref 3.5–5.3)
PROT SERPL-MCNC: 6.5 G/DL (ref 6.4–8.2)
RBC # BLD AUTO: 3.61 MILLION/UL (ref 3.81–5.12)
SODIUM SERPL-SCNC: 143 MMOL/L (ref 136–145)
WBC # BLD AUTO: 5.02 THOUSAND/UL (ref 4.31–10.16)

## 2021-03-01 PROCEDURE — 99214 OFFICE O/P EST MOD 30 MIN: CPT | Performed by: INTERNAL MEDICINE

## 2021-03-01 PROCEDURE — 85025 COMPLETE CBC W/AUTO DIFF WBC: CPT

## 2021-03-01 PROCEDURE — 36415 COLL VENOUS BLD VENIPUNCTURE: CPT

## 2021-03-01 PROCEDURE — 80053 COMPREHEN METABOLIC PANEL: CPT

## 2021-03-01 RX ORDER — ACETAMINOPHEN 325 MG/1
650 TABLET ORAL EVERY 6 HOURS PRN
Status: CANCELLED | OUTPATIENT
Start: 2021-03-01

## 2021-03-01 NOTE — PROGRESS NOTES
Called and spoke to patient  Introduced myself and explained my role as a nurse navigator  I informed her that Dr Sandra Hurtado reached out to me following her visit today and asked that I schedule a PET/CT  PET/CT is scheduled for Friday, [email or web address] at 9:30  I reviewed the instructions for the procedure with the patient and instructed her to arrive at 9:15  I informed patient she will be discussed at the lung tumor conference on Monday, 3/8/2021  I also told her to expect a call from radiation oncology to schedule a consult  I provided my direct phone number should she require any further assistance  Patient was appreciative for the call

## 2021-03-01 NOTE — PROGRESS NOTES
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 of the lung with left scapula involvement diagnosed on 08/2016 PDL expression more than 50%, negative for EGFR mutation, ALK  rearrangement, Ros1 mutation     Treated initially with Pembrolizumab complicated with pneumonitis and later on nivolumab with prednisone 10 mg p o  Daily with excellent response      Disease progression in August 2018 in the left scapula with pain no new lesions by PET scan   Status post radiation therapy to the left scapula and treated with Alimta 500 milligram/meter squared, carboplatin AUC 5   After 3 cycles,  CT scan in January 2019 showed stable disease, currently on maintenance Alimta 500 milligram/meter squared every 3 weeks           CT scan 4/2020 showed stable disease in the left upper lobe of the lung , no evidence of new lesions     Alimta dose was reduced to 400 milligram/meter IV as creatinine came down to 1 28  Subsequent CT scan showed stable disease on 11/2020, no new lesions     Pemetrexed dose was reduced to 300 milligram/meter squared every 4 weeks  Cycle #33 due 2/2/21  CT scan of the chest on 02/22/2021 showed stable left lobe lung nodules, however she has progressive chronic renal insufficiency, at this time will hold pemetrexed    Will ask the patient to be seen by Radiation Oncology to discuss SB RT to the left upper lobe lung nodule     She will continue to follow up with Nephrology     Follow-up in 2 months with CT scan of the chest    Her case will be discussed in the tumor conference               Labs and imaging studies are reviewed by ordering provider once results are available  If there are findings that need immediate attention, you will be contacted when results available     Discussing results and the implication on your healthcare is best discussed in person at your follow-up visit  HPI:    Tali Veloz was admitted to the hospital with arrhythmia and was found to have right lower lobe infiltrate in August 2016   She wasreated with antibiotics however repeat chest x-ray showed persistent right lower lobe infiltrate  Subsequently the patient had a CT scan of the chest which showed a right perihilar mass, subcarinal lymphadenopathy, lytic lesion of the right costovertebral junction at T10 level   PET scan October 2016 showed a right perihilar mass measuring 3 5 cm with SUV of 8 9, subcarinal lymph nodes measuring 3 4 x 2 2 cm with SUV of 9 2  Nodule in the left upper lobe lung measured 2 x 1 1 cm   There was a lytic lesion involving the right 10th costovertebral junction with SUV of 14  4      Biopsy showed non-small cell carcinoma with features suggesting of adenocarcinoma positive for CK 7, CK 19, CA-19-9, ANEUDY-3, partially positive for P40, p63, negative for TTF-1   She had a history of uterine cancer in 2000 status post hysterectomy and did not require radiation or chemotherapy   She is status post bilateral oophorectomy, right knee replacement,   tonsillectomy       She used to smoke for 35 years 1 pack per day however quit smoking 21 years ago   She used hormonal replacement therapy for 3 years  Patria Engel has a family history significant for skin cancer in her father and coronary artery disease in mother  Patria Engel has 2 healthy children      Treated initially with Pembrolizumab December 2016, Finished in May 2017 secondary to grade 3 pneumonitis  Initiated on prednisone     Progression: Nivolumab 240 mg flat dose every 2 weeks along with prednisone 10 mg p o   Daily initiated April 2018- October 2018 with excellent response      Liquid biopsy showed K-MADHURI mutation G12C, no evidence of MSI high        Disease progression in August 2018 in the left scapula with pain no new lesions by PET scan   Status post radiation therapy to the left scapula and treated with Alimta 500 milligram/meter squared, carboplatin AUC 5   After 3 cycles,  CT scan in January 2019 showed stable disease  Carbo discontinued 3/2019    Maintenance Alimta 500 milligram/meter squared every 3 weeks initiated 3/2019        Cr 2/20 was 1 5   Plan was to dose reduce Alimta to 400mg/m2; however cr 2 16 2/24/20 and Alimta was held       3/4/20:  renal u/s  Minimal fullness of the left renal collecting system without matthieu hydronephrosis  Chronic right kidney lower pole cortical scar, with adjacent parenchymal calcification measuring 5 mm       She was on prednisone 5 mg p o  daily because of previous history of pneumonitis  CT scan chest 1/3/2020 showed no evidence of infiltration in the lung parenchyma, prednisone was reduced every other day for 1 month and then discontinued        CT scan 4/2020 showed stable disease in the left upper lobe of the lung     CT scan on 07/2020 showed stable disease in the left upper lobe of the lung, pancreatic cyst 2 3 cm, stable from the previous CT scan however bigger from last year CT scan       Chronic LLE edema since fall she had 2/2020    Venous doppler negative for clot        Interval History: Progressive renal insufficiency    CT scan of the chest in 02/22/2021 showed stable pulmonary nodule especially in the left upper lobe, mild basilar ground-glass opacities, stable cystic pancreatic mass       Previous Treatment:         Test Results:    Imaging: Ct Chest Wo Contrast    Result Date: 2/27/2021  Narrative: CT CHEST WITHOUT IV CONTRAST INDICATION:   C34 92: Malignant neoplasm of unspecified part of left bronchus or lung  Known history of stage IV adenocarcinoma of the lung with left scapular involvement  Status post radiation therapy to the left scapula  Currently maintained on immuno-oncologic therapy  Surveillance  COMPARISON:  CT, dated November 17, 2020   TECHNIQUE: CT examination of the chest was performed without intravenous contrast   Axial, sagittal, and coronal 2D reformatted images were created from the source data and submitted for interpretation  Radiation dose length product (DLP) for this visit:  478 mGy-cm   This examination, like all CT scans performed in the [facility], was performed utilizing techniques to minimize radiation dose exposure, including the use of iterative reconstruction and automated exposure control  FINDINGS: LUNGS:  Redemonstrated are multiple pulmonary nodules  The dominant focus in the left upper lobe measures approximately 22 x 15 mm in greatest transverse dimensions (series 3, image 41), previously 21 x 15 mm  A more peripheral focus also in the left upper lobe measures 13 x 12 mm (series 3, image 43), previously 13 x 13 mm  Nodularity in the right costophrenic sulcus measures 16 x 12 mm in transverse dimensions (series 3, image 98), previously 15 x 11 mm  A left lower lobe pleural nodule measures 10 x 8 mm in greatest transverse dimensions (series 3, image 90), previously 9 x 8 mm  A 4 mm right lower lobe pulmonary nodule is stable (series 3, image 79)  There are no new pulmonary nodules  Right middle lobar atelectasis is presumed secondary to radiation fibrosis, stable  There are mild new bibasilar areas of mild groundglass opacity, nonspecific  A small amount of debris is present in the upper trachea  The endobronchial tree is under otherwise normal  PLEURA:  Unremarkable  HEART/GREAT VESSELS:  Atherosclerotic changes are noted in thoracic aorta and coronary arteries  MEDIASTINUM AND ANABEL:  Unremarkable  CHEST WALL AND LOWER NECK:   Unremarkable  VISUALIZED STRUCTURES IN THE UPPER ABDOMEN:  Redemonstrated is the incompletely characterized cystic mass in the body of the pancreas, measuring 30 x 30 mm in greatest transverse dimensions (series 2, image 61), previously 30 x 29 mm  OSSEOUS STRUCTURES:  Spinal degenerative changes are noted    No acute fracture or destructive osseous lesion  Sclerosis of the left scapular body corresponding to known metastatic disease is stable  Impression: 1  Stable pulmonary nodules  2   Interval development of mild bibasilar ground glass opacities  Though nonspecific, aspiration could be a possible source  Continued follow-up on subsequent oncologic surveillance is recommended  3   Stable size of the incompletely characterized cystic pancreatic mass  Once again, MRI utilizing a pancreatic mass protocol is recommended for complete characterization  The study was marked in EPIC for significant notification  Workstation performed: [id number]       Labs:   Lab Results   Component Value Date    WBC 5 02 03/01/2021    HGB 10 7 (L) 03/01/2021    HCT 34 0 (L) 03/01/2021    MCV 94 03/01/2021     03/01/2021     Lab Results   Component Value Date     11/23/2015    K 4 5 03/01/2021     (H) 03/01/2021    CO2 28 03/01/2021    ANIONGAP 9 11/23/2015    BUN 26 (H) 03/01/2021    CREATININE 2 02 (H) 03/01/2021    GLUCOSE 149 (H) 11/23/2015    GLUF 119 (H) 03/01/2021    CALCIUM 9 7 03/01/2021    CORRECTEDCA 10 4 (H) 03/01/2021    AST 31 03/01/2021    ALT 17 03/01/2021    ALKPHOS 95 03/01/2021    PROT 6 8 11/23/2015    BILITOT 0 65 11/23/2015    EGFR 24 03/01/2021       No results found for: IRON, TIBC, FERRITIN    No results found for: [id number]      ROS: Review of Systems   Constitutional: Positive for fatigue  Negative for appetite change, chills, diaphoresis and unexpected weight change  HENT:   Negative for mouth sores, nosebleeds, sore throat, trouble swallowing and voice change  Eyes: Negative for eye problems and icterus  Respiratory: Negative for chest tightness, cough, hemoptysis and wheezing  Cardiovascular: Negative for chest pain, leg swelling and palpitations  Gastrointestinal: Negative for abdominal distention, abdominal pain, blood in stool, constipation, diarrhea, nausea and vomiting  Endocrine: Negative for hot flashes  Genitourinary: Negative for bladder incontinence, difficulty urinating, dyspareunia, dysuria and frequency  Musculoskeletal: Negative for arthralgias, back pain, gait problem, neck pain and neck stiffness  Skin: Negative for itching and rash  Neurological: Negative for dizziness, gait problem, headaches, numbness, seizures and speech difficulty  Hematological: Negative for adenopathy  Does not bruise/bleed easily  Psychiatric/Behavioral: Negative for decreased concentration, depression, sleep disturbance and suicidal ideas  The patient is not nervous/anxious  Current Medications: Reviewed  Allergies: Reviewed  PMH/FH/SH:  Reviewed      Physical Exam:    Body surface area is 1 95 meters squared  Wt Readings from Last 3 Encounters:   03/01/21 90 kg (198 lb 8 oz)   02/02/21 90 2 kg (198 lb 13 7 oz)   01/21/21 88 7 kg (195 lb 8 oz)        Temp Readings from Last 3 Encounters:   03/01/21 (!) 97 4 °F (36 3 °C) (Tympanic)   02/02/21 (!) 96 7 °F (35 9 °C) (Temporal)   01/21/21 (!) 96 3 °F (35 7 °C) (Tympanic)        BP Readings from Last 3 Encounters:   03/01/21 130/76   02/02/21 113/56   01/21/21 128/70         Pulse Readings from Last 3 Encounters:   03/01/21 72   02/02/21 67   01/21/21 60        Physical Exam  Vitals signs reviewed  Constitutional:       General: She is not in acute distress  Appearance: She is well-developed  She is not diaphoretic  HENT:      Head: Normocephalic and atraumatic  Eyes:      Conjunctiva/sclera: Conjunctivae normal    Neck:      Musculoskeletal: Normal range of motion and neck supple  Trachea: No tracheal deviation  Cardiovascular:      Rate and Rhythm: Normal rate and regular rhythm  Heart sounds: No murmur  No friction rub  No gallop  Pulmonary:      Effort: Pulmonary effort is normal  No respiratory distress  Breath sounds: Normal breath sounds  No wheezing or rales     Chest:      Chest wall: No tenderness  Abdominal:      General: There is no distension  Palpations: Abdomen is soft  Tenderness: There is no abdominal tenderness  Musculoskeletal:      Right lower leg: No edema  Left lower leg: No edema  Lymphadenopathy:      Cervical: No cervical adenopathy  Skin:     General: Skin is warm and dry  Coloration: Skin is not pale  Findings: No erythema  Neurological:      Mental Status: She is alert and oriented to person, place, and time  Psychiatric:         Behavior: Behavior normal          Thought Content: Thought content normal          Judgment: Judgment normal          ECO    Goals and Barriers:  Current Goal: Minimize effects of disease  Barriers: None  Patient's Capacity to Self Care:  Patient is able to self care      Code Status: [unfilled]

## 2021-03-02 ENCOUNTER — HOSPITAL ENCOUNTER (OUTPATIENT)
Dept: INFUSION CENTER | Facility: CLINIC | Age: 71
End: 2021-03-02

## 2021-03-08 ENCOUNTER — DOCUMENTATION (OUTPATIENT)
Dept: CARDIAC SURGERY | Facility: CLINIC | Age: 71
End: 2021-03-08

## 2021-03-08 NOTE — PROGRESS NOTES
Ms Delores Martins was discussed today at our Multidisciplinary Thoracic Oncology Tumor Conference  She has a history of Stage Iv adenocarcinoma of the left upper lobe invading the left scapula in 2016 status post chemotherapy  Her disease has been stable until recent chest CT which demonstrated the main left upper lobe lesion as stable; however there is slight progression of a peripheral ground glass lesion in the left upper lobe from November to February  It measures 1 3x1 2cm from 1 0x1 2cm and has increased density  Her creatinine is elevated so she cannot have further Alimta therapy  The group's recommendation is referral to radiation oncology for SBRT to this nodule

## 2021-03-12 ENCOUNTER — HOSPITAL ENCOUNTER (OUTPATIENT)
Dept: RADIOLOGY | Age: 71
Discharge: HOME | End: 2021-03-12
Payer: MEDICARE

## 2021-03-12 ENCOUNTER — IMMUNIZATIONS (OUTPATIENT)
Dept: FAMILY MEDICINE CLINIC | Facility: HOSPITAL | Age: 71
End: 2021-03-12
Payer: MEDICARE

## 2021-03-12 ENCOUNTER — TELEPHONE (OUTPATIENT)
Dept: HEMATOLOGY ONCOLOGY | Facility: MEDICAL CENTER | Age: 71
End: 2021-03-12

## 2021-03-12 DIAGNOSIS — C34.12 MALIGNANT NEOPLASM OF UPPER LOBE, LEFT BRONCHUS OR LUNG (HCC): ICD-10-CM

## 2021-03-12 DIAGNOSIS — C34.92 ADENOCARCINOMA OF LEFT LUNG, STAGE 4 (HCC): Primary | ICD-10-CM

## 2021-03-12 DIAGNOSIS — C34.90 MALIGNANT NEOPLASM OF LUNG, UNSPECIFIED LATERALITY, UNSPECIFIED PART OF LUNG (HCC): ICD-10-CM

## 2021-03-12 DIAGNOSIS — Z23 ENCOUNTER FOR IMMUNIZATION: Primary | ICD-10-CM

## 2021-03-12 LAB — GLUCOSE SERPL-MCNC: 109 MG/DL (ref 65–140)

## 2021-03-12 PROCEDURE — A9552 F18 FDG: HCPCS

## 2021-03-12 PROCEDURE — 0002A SARS-COV-2 / COVID-19 MRNA VACCINE (PFIZER-BIONTECH) 30 MCG: CPT

## 2021-03-12 PROCEDURE — 91300 SARS-COV-2 / COVID-19 MRNA VACCINE (PFIZER-BIONTECH) 30 MCG: CPT

## 2021-03-12 PROCEDURE — 78815 PET IMAGE W/CT SKULL-THIGH: CPT

## 2021-03-12 PROCEDURE — 82948 REAGENT STRIP/BLOOD GLUCOSE: CPT

## 2021-03-12 PROCEDURE — G1004 CDSM NDSC: HCPCS

## 2021-03-12 NOTE — TELEPHONE ENCOUNTER
Natasha from [address] [place] [address] O[place] called in [facility] finding [phone]

## 2021-03-12 NOTE — TELEPHONE ENCOUNTER
3/12/21 PET  IMPRESSION:  1  Slowly enlarging dominant left upper lung lesion with increasing FDG uptake, most concerning for viable tumor  2   Enlarging adjacent left upper lung nodule  Although this does not demonstrate significant FDG uptake, this remains concerning for a low-grade neoplasm  Consider tissue sampling if this would alter clinical management  3   Slowly increasing nodular infiltrate in the right medial lung base with increased FDG uptake  This may be inflammatory/infectious, with neoplasm also not excluded  Close follow-up or tissue sampling also suggested  4   Enlarging pancreatic cystic lesion without significant FDG uptake  Dedicated contrast MR evaluation suggested for further evaluation  The study was marked in EPIC for significant notification    Workstation performed: [id number] Billing Type: Third-Party Bill

## 2021-03-12 NOTE — TELEPHONE ENCOUNTER
Left voicemail for pt asking her to call back to review Dr Violeta Verma recommendations based on her recent PET results

## 2021-03-12 NOTE — TELEPHONE ENCOUNTER
Dr Abe Pate would like pt to see radiation for lung lesion and Dr Janine Trevino for pancreatic cystic lesion  Will place consults and notify pt

## 2021-03-12 NOTE — TELEPHONE ENCOUNTER
Pt called back and I reviewed Dr Cherelle Ferris recommendations  Pt already has a radiation consult ordered  She is agreeable with surgical consult

## 2021-03-15 ENCOUNTER — TELEPHONE (OUTPATIENT)
Dept: HEMATOLOGY ONCOLOGY | Facility: CLINIC | Age: 71
End: 2021-03-15

## 2021-03-15 DIAGNOSIS — F41.9 ANXIETY: ICD-10-CM

## 2021-03-15 DIAGNOSIS — I48.91 ATRIAL FIBRILLATION, UNSPECIFIED TYPE (HCC): ICD-10-CM

## 2021-03-15 RX ORDER — SOTALOL HYDROCHLORIDE 80 MG/1
80 TABLET ORAL 2 TIMES DAILY
Qty: 180 TABLET | Refills: 3 | Status: SHIPPED | OUTPATIENT
Start: 2021-03-15 | End: 2022-03-28 | Stop reason: SDUPTHER

## 2021-03-15 RX ORDER — VENLAFAXINE 37.5 MG/1
37.5 TABLET ORAL DAILY
Qty: 90 TABLET | Refills: 3 | OUTPATIENT
Start: 2021-03-15

## 2021-03-15 NOTE — TELEPHONE ENCOUNTER
1 year supply sent by Dr Stewart Barth (90 tablets w/ 3 refills) on 11/23/20  Sent to [facility]

## 2021-03-15 NOTE — TELEPHONE ENCOUNTER
New Patient GI Form   Patient Details:  Gustavo Resendez  1950  [id number]     Background Information:  [address] starts by opening a telephone encounter and gathering the following information   Who is calling to schedule and relationship? office   Referring Provider Milan Flores   To which specialty is the referral?  surgical oncology   Reason for Visit? New Diagnosis   Tumor Type?  enlarging pancreatic mass   Is there a confirmed diagnosis from biopsy/tissue reviewed by Pathology?  no   Date of Tissue Diagnosis  (If done outside of [facility] please obtain report and slides)  (If no tissue diagnosis, please stop and discuss with Navigator prior to scheduling)     Is patient aware of the diagnosis? yes   Has Imaging been completed? yes   If YES, where was the imaging done? (If outside [place] [address] obtain records)     Have any endoscopies been done (colonoscopy, EGD, EUS)  no   If YES where were they performed? (If outside of [facility] obtain the records)     Has blood work been done?  yes   If YES, where was the blood work done? (If outside of [facility] obtain records)  [facility]   Is there a personal history of cancer? (If YES please list type)  Yes stage IV lung ca w/ new bone mets   Is there a family history of cancer? (If YES please list type)  unknown by caller   Scheduling Information:   Preferred Valentine Beth   Are there any days the patient cannot be seen? Miscellaneous:    After completing the above information, please route to finance, nurse navigation and clinical trials for review

## 2021-03-17 ENCOUNTER — CLINICAL SUPPORT (OUTPATIENT)
Dept: RADIATION ONCOLOGY | Facility: HOSPITAL | Age: 71
End: 2021-03-17
Attending: RADIOLOGY
Payer: MEDICARE

## 2021-03-17 VITALS
DIASTOLIC BLOOD PRESSURE: 64 MMHG | HEART RATE: 64 BPM | RESPIRATION RATE: 18 BRPM | WEIGHT: 195 LBS | BODY MASS INDEX: 33.47 KG/M2 | SYSTOLIC BLOOD PRESSURE: 120 MMHG | TEMPERATURE: 97.7 F | OXYGEN SATURATION: 97 %

## 2021-03-17 DIAGNOSIS — C79.52 SECONDARY MALIGNANT NEOPLASM OF BONE AND BONE MARROW (HCC): Primary | ICD-10-CM

## 2021-03-17 DIAGNOSIS — C41.9 MALIGNANT NEOPLASM OF BONE WITH METASTASES (HCC): ICD-10-CM

## 2021-03-17 DIAGNOSIS — C79.51 SECONDARY MALIGNANT NEOPLASM OF BONE AND BONE MARROW (HCC): Primary | ICD-10-CM

## 2021-03-17 DIAGNOSIS — C34.91 MALIGNANT NEOPLASM OF UNSPECIFIED PART OF RIGHT BRONCHUS OR LUNG (HCC): Primary | ICD-10-CM

## 2021-03-17 PROCEDURE — 99211 OFF/OP EST MAY X REQ PHY/QHP: CPT | Performed by: RADIOLOGY

## 2021-03-17 NOTE — PROGRESS NOTES
Consultation - Radiation Oncology      QJA:[id number] : 1950  Encounter: [id number]  Patient Information: New Crystal  Chief Complaint   Patient presents with   Yamilex Sandoval Consult     Radiation Oncology      Cancer Staging  No matching staging information was found for the patient  History of Present Illness   Valentino Sheer is a 65y o  year old female who presents with a history of Stage IV   Lung cancer diagnosed initially in  with bone metastases  She has previously received palliative radiation therapy to the thoracic spine and left scapula x2  Her course has actually been rather indolent with minimal disease burden at this time  She presents today for consideration of stereotactic radiation to left pulmonary nodules  Workup to date as below:    Patient presents for radiation consult for Stage IV lung cancer, referred by Dr Nima Taveras to discuss SBRT to HUNTER of lung  65year old female with a history of Stage IV lung cancer diagnosed in  with bone metastases  She was treated with systemic therapy  She received radiation therapy to T9-T11 spine in 13 fractions in   She also received radiation to left scapula in 2017 (3000 cGy) and re-irradiation to left scapula (2000 cGy) in 2018  She then continued on systemic therapy  Last follow-up in Radiation Oncology on 18, she was discharged to continue following with Medical Oncology  2018 - 2021  Continued systemic therapy with maintenance Alimta    21 CT chest wo contrast  1  Stable pulmonary nodules  2   Interval development of mild bibasilar ground glass opacities  Though nonspecific, aspiration could be a possible source  Continued follow-up on subsequent oncologic surveillance is recommended  3   Stable size of the incompletely characterized cystic pancreatic mass  Once again, MRI utilizing a pancreatic mass protocol is recommended for complete characterization  3/1/21 Med Onc, Dr Jayne Patiño follow-up  CT scan of the chest showed stable left lobe lung nodules, however she has progressive chronic renal insufficiency, hold Alimta at this time  Refer to Radiation Oncology to discuss SBRT to the left upper lobe lung nodule   She will continue to follow with nephrology  F/U in 2 months with CT chest    Discuss case at thoracic tumor conference  3/8/21 Multidisciplinary Thoracic Oncology Tumor Conference:  Disease has been stable until recent chest CT which demonstrated the main left upper lobe lesion as stable; however there is slight progression of a peripheral ground glass lesion in the left upper lobe from November to February  It measures 1 3x1 2cm from 1 0x1 2cm and has increased density  Her creatinine is elevated so she cannot have further Alimta therapy  The group's recommendation is referral to radiation oncology for SBRT to this nodule  3/12/21 PET/CT  1  Slowly enlarging dominant left upper lung lesion with increasing FDG uptake, most concerning for viable tumor  2   Enlarging adjacent left upper lung nodule  Although this does not demonstrate significant FDG uptake, this remains concerning for a low-grade neoplasm  Consider tissue sampling if this would alter clinical management  3   Slowly increasing nodular infiltrate in the right medial lung base with increased FDG uptake  This may be inflammatory/infectious, with neoplasm also not excluded  Close follow-up or tissue sampling also suggested  4   Enlarging pancreatic cystic lesion without significant FDG uptake  Dedicated contrast MR evaluation suggested for further evaluation  3/12/21 Dr Jo Pham nurse contacted pt to discuss Dr Jo Pham recommendations based on PET/CT, refer to Rad Onc for lung lesion and Dr Mickie Taylor for pancreatic cystic lesion  Pt agreeable         3/23/21 Dr Mickie Taylor, Surg Onc  4/8/21 Nephrology follow-up  4/19/21 Palliative care   4/28/21 CT chest wo contrast  5/3/21 Dr Nima Taveras follow-up      Historical Information   Oncology History   Adenocarcinoma of lung, stage 4 ([facility] [address] )   10/18/2016 Initial Diagnosis    Adenocarcinoma of lung, stage 4 ([facility] [address] )     10/18/2016 Biopsy    Final Diagnosis  A  Bone, T10, biopsy:     - Non-small cell carcinoma with features suggesting adenocarcinoma; see note           11/7/2016 - 12/1/2016 Radiation    Course 1: T9 - T11 SPINE  13 fractions   Total Dose = 3,250 cGy     11/17/2016 Biopsy    Final Diagnosis  Lymph Node, Level 7: Conclusive evidence of Malignancy  Poorly differentiated non-small cell carcinoma      Lung, right main stem bronchus, biopsy:              - Poorly differentiated adenocarcinoma       12/15/2016 - 6/1/2017 Chemotherapy    Pembrolizumab 200 mg IV flat dose every 3 weeks      9/13/2017 Biopsy    Bone, left scapula, biopsy:  -  Positive for malignancy, consistent with metastatic adenocarcinoma          10/3/2017 - 10/16/2017 Radiation    palliative course of radiation therapy to the left scapular lesion to 3000 cGy( metastatic from adenocarcinoma of the lung)          4/10/2018 - 10/9/2018 Chemotherapy    nivolumab with prednisone 10 mg p o     11/6/2018 -  Chemotherapy    11/6/18   Alimta 500 milligram/meter squared, carboplatin AUC 5 every 3 weeks,     11/6/2018 - 3/1/2021 Chemotherapy    cyanocobalamin injection 1,000 mcg, 1,000 mcg, Intramuscular, Once, 6 of 10 cycles  Administration: 1,000 mcg (1/17/2020), 1,000 mcg (6/10/2020), 1,000 mcg (10/14/2020), 1,000 mcg (1/5/2021)  fosaprepitant (EMEND) 150 mg in sodium chloride 0 9 % 250 mL IVPB, 150 mg, Intravenous, Once, 1 of 6 cycles  Administration: 150 mg (2/2/2021)  PEMEtrexed (ALIMTA) 1,020 mg in sodium chloride 0 9 % 100 mL chemo infusion, 500 mg/m2 = 1,020 mg, Intravenous, Once, 32 of 37 cycles  Dose modification: 400 mg/m2 (original dose 500 mg/m2, Cycle 23, Reason: Other (See Comments), Comment: decreasing crcl), 300 mg/m2 (original dose 500 mg/m2, Cycle 26, Reason: Treatment Parameters Not Met), 300 mg/m2 (original dose 500 mg/m2, Cycle 30, Reason: Max Dose Reached)  Administration: 1,020 mg (5/24/2019), 1,000 mg (6/14/2019), 1,000 mg (7/5/2019), 1,000 mg (8/2/2019), 1,000 mg (8/23/2019), 1,000 mg (9/13/2019), 1,000 mg (10/4/2019), 1,000 mg (10/25/2019), 1,000 mg (11/15/2019), 1,000 mg (12/6/2019), 1,000 mg (12/27/2019), 1,000 mg (1/17/2020), 1,000 mg (2/7/2020), 800 mg (5/22/2020), 800 mg (6/10/2020), 800 mg (7/1/2020), 600 mg (7/29/2020), 800 mg (8/19/2020), 600 mg (11/11/2020), 600 mg (1/5/2021), 600 mg (2/2/2021), 600 mg (10/14/2020)     11/12/2018 - 11/26/2018 Radiation    Course: C3: Left Scapula retreat  10 fractions  2,000 cGy         Malignant neoplasm of unspecified part of right bronchus or lung ([address] ) (Resolved)   5/24/2017 Initial Diagnosis    Malignant neoplasm of unspecified part of right bronchus or lung ([address] ) (Resolved)     Malignant neoplasm of bone with metastases ([address] )   5/24/2017 Initial Diagnosis    Malignant neoplasm of bone with metastases ([address] )           Past Medical History:   Diagnosis Date    Arthritis     Atrial fibrillation ([address] )     Diabetes mellitus ([address] )     Diabetes mellitus type 2, uncomplicated ([address] )     Last assessed: 8/17/17    Essential hypertension     Frozen shoulder     L shoulder    GERD (gastroesophageal reflux disease)     Hx of cancer of uterus     Last assessed: 8/21/15    Hyperlipidemia     Hyponatremia 11/16/2016    Lung mass     diagnosed 9/2016    Malignant neoplasm without specification of site ([address] )     Skin cancer, basal cell     right eye area    Stage 4 lung cancer ([address] )      Past Surgical History:   Procedure Laterality Date    APPENDECTOMY      BRONCHOSCOPY N/A 11/17/2016    Procedure: BRONCHOSCOPY FLEXIBLE;  Surgeon: Kirsty Kevin MD;  Location: [facility];  Service:    [place] [facility] CHOLECYSTECTOMY      COLONOSCOPY      GALLBLADDER SURGERY      HYSTERECTOMY  2000    Total abdominal    LARYNGOSCOPY Flexible Fiberoptic, (Therapeutic), Resolved: 16    MOHS SURGERY      Micrographic Surgery Face    LA BRONCHOSCOPY NEEDLE BX TRACHEA MAIN STEM&/BRON N/A 2016    Procedure: EBUS; FROZEN SECTION ;  Surgeon: Chaz Pacheco MD;  [facility];  Service: Thoracic    [place] [address] N/A 2017    Procedure: Ty Fell;  Surgeon: Gerome Cockayne, MD;  Location: [facility];   Service: Pulmonary    TONSILECTOMY AND ADNOIDECTOMY      TOTAL KNEE ARTHROPLASTY Right 2014       Family History   Problem Relation Age of Onset    Heart disease Father         cardiac disorder    Hypertension Father     Arthritis Father     Stroke Father         cerebrovascular accident   Willena Rand Diabetes Mother     Heart disease Mother    Willena Rand Dementia Mother     Hypertension Mother     Thyroid disease Mother     Cancer Maternal Grandfather         of unknown origin    Cancer Family     Depression Family     Diabetes Family     Hyperlipidemia Family         essential    Heart disease Family     Hypertension Family     Stroke Family         syndrome    Lung cancer Paternal Uncle     Muscular dystrophy Brother        Social History   Social History     Substance and Sexual Activity   Alcohol Use No     Social History     Substance and Sexual Activity   Drug Use No     Social History     Tobacco Use   Smoking Status Former Smoker    Packs/day: 1 00    Years: 50 00    Pack years: 50 00    Types: Cigarettes    Start date:     Quit date:     Years since quittin 2   Smokeless Tobacco Never Used   Tobacco Comment    Quit in          Meds/Allergies     Current Outpatient Medications:     acetaminophen (TYLENOL) 325 mg tablet, Take 650 mg by mouth every 6 (six) hours as needed for mild pain, Disp: , Rfl:     apixaban (Eliquis) 5 mg, TAKE 1 TABLET TWICE A DAY, Disp: 180 tablet, Rfl: 3    Calcium Carb-Cholecalciferol 600-800 MG-UNIT TABS, Take 1 tablet by mouth every other day, Disp: , Rfl:     Cholecalciferol (VITAMIN D) 2000 units CAPS, Take by mouth, Disp: , Rfl:     cyanocobalamin (VITAMIN B-12) 100 mcg tablet, Take by mouth daily, Disp: , Rfl:     Folic Acid 0 8 MG CAPS, , Disp: , Rfl:     furosemide (LASIX) 40 mg tablet, Take 1 tablet (40 mg total) by mouth as needed (leg swelling, fluid overload), Disp: , Rfl:     linaGLIPtin (Tradjenta) 5 MG TABS, Take 5 mg by mouth daily, Disp: 90 tablet, Rfl: 3    losartan (COZAAR) 50 mg tablet, Take 1 tablet (50 mg total) by mouth daily, Disp: 90 tablet, Rfl: 3    metFORMIN (GLUCOPHAGE) 1000 MG tablet, Take 1 tablet (1,000 mg total) by mouth 2 (two) times a day with meals, Disp: 180 tablet, Rfl: 3    metoclopramide (REGLAN) 10 mg tablet, TAKE 1/2 TABLET (5MG TOTAL)3 TIMES A DAY BEFORE MEALS AS NEEDED FOR NAUSEA, Disp: 60 tablet, Rfl: 0    metoprolol tartrate (LOPRESSOR) 25 mg tablet, Take 1 tablet (25 mg total) by mouth every 12 (twelve) hours, Disp: 180 tablet, Rfl: 3    omeprazole (PriLOSEC) 20 mg delayed release capsule, Take 1 capsule (20 mg total) by mouth daily, Disp: 90 capsule, Rfl: 3    ondansetron (ZOFRAN-ODT) 4 mg disintegrating tablet, Take 1 tablet (4 mg total) by mouth every 6 (six) hours as needed for nausea or vomiting (Patient taking differently: Take 4 mg by mouth every 6 (six) hours as needed for nausea or vomiting ), Disp: 30 tablet, Rfl: 3    potassium chloride (K-DUR,KLOR-CON) 20 mEq tablet, Take 2 tablets (40 mEq total) by mouth daily as needed (low K+), Disp: , Rfl:     sotalol (BETAPACE) 80 mg tablet, Take 1 tablet (80 mg total) by mouth 2 (two) times a day 1 tab PO BID, Disp: 180 tablet, Rfl: 3    venlafaxine (EFFEXOR) 37 5 mg tablet, TAKE 1 TABLET DAILY, Disp: 90 tablet, Rfl: 3    dexamethasone (DECADRON) 4 mg tablet, TAKE 1 TABLET TWICE A DAY WITH FOOD, ON DAY BEFORE CHEMOTHERAPY, DAY OF CHEMOTHERAPY AND DAY AFTER CHEMOTHERAPY (Patient not taking: Reported on 3/17/2021), Disp: 90 tablet, Rfl: 1   doxazosin (CARDURA) 2 mg tablet, Take 1 tablet (2 mg total) by mouth daily at bedtime (Patient not taking: Reported on 1/21/2021), Disp: 30 tablet, Rfl: 5  Allergies   Allergen Reactions    Amoxicillin Rash and Hives    Cardizem [Diltiazem] Rash     Rash      Statins Myalgia     Severe muscle aching  Terrible pains         Review of Systems   Review of Systems   Constitutional: Positive for fatigue (tired but feeling good)  HENT: Negative  Eyes: Negative  Respiratory: Negative  Cardiovascular: Positive for leg swelling (left leg, taking lasix only prn)  Gastrointestinal: Negative  Endocrine: Negative  Genitourinary: Negative  Musculoskeletal: Positive for arthralgias (left knee, arthritic pains)  Skin: Negative  Allergic/Immunologic: Negative  Neurological: Negative  Hematological: Negative  Psychiatric/Behavioral: Negative  OBJECTIVE:   /64 (BP Location: Left arm)   Pulse 64   Temp 97 7 °F (36 5 °C) (Temporal)   Resp 18   Wt 88 5 kg (195 lb)   LMP  (LMP Unknown)   SpO2 97%   BMI 33 47 kg/m²   Pain Assessment:  0  Performance Status: Karnofsky: 90 - Able to carry on normal activity; minor signs or symptoms of disease     Physical Exam    the patient presents today no apparent distress  Sclera anicteric  No palpable cervical or supraclavicular lymphadenopathy  Lungs clear to auscultation bilaterally  Normal S1-S2 regular rate and rhythm  Normal speech  Normal affect        RESULTS  Lab Results    Chemistry        Component Value Date/Time     11/23/2015 0613    K 4 5 03/01/2021 0713    K 4 0 11/23/2015 0613     (H) 03/01/2021 0713     11/23/2015 0613    CO2 28 03/01/2021 0713    CO2 29 3 11/23/2015 0613    BUN 26 (H) 03/01/2021 0713    BUN 23 11/23/2015 0613    CREATININE 2 02 (H) 03/01/2021 0713    CREATININE 1 14 11/23/2015 0613        Component Value Date/Time    CALCIUM 9 7 03/01/2021 0713    CALCIUM 9 2 11/23/2015 0613    ALKPHOS 95 03/01/2021 0713    ALKPHOS 81 11/23/2015 0613    AST 31 03/01/2021 0713    AST 20 11/23/2015 0613    ALT 17 03/01/2021 0713    ALT 32 11/23/2015 0613    BILITOT 0 65 11/23/2015 0613            Lab Results   Component Value Date    WBC 5 02 03/01/2021    HGB 10 7 (L) 03/01/2021    HCT 34 0 (L) 03/01/2021    MCV 94 03/01/2021     03/01/2021         Imaging Studies  Ct Chest Wo Contrast    Result Date: 2/27/2021  Narrative: CT CHEST WITHOUT IV CONTRAST INDICATION:   C34 92: Malignant neoplasm of unspecified part of left bronchus or lung  Known history of stage IV adenocarcinoma of the lung with left scapular involvement  Status post radiation therapy to the left scapula  Currently maintained on immuno-oncologic therapy  Surveillance  COMPARISON:  CT, dated November 17, 2020  TECHNIQUE: CT examination of the chest was performed without intravenous contrast   Axial, sagittal, and coronal 2D reformatted images were created from the source data and submitted for interpretation  Radiation dose length product (DLP) for this visit:  478 mGy-cm   This examination, like all CT scans performed in the [facility], was performed utilizing techniques to minimize radiation dose exposure, including the use of iterative reconstruction and automated exposure control  FINDINGS: LUNGS:  Redemonstrated are multiple pulmonary nodules  The dominant focus in the left upper lobe measures approximately 22 x 15 mm in greatest transverse dimensions (series 3, image 41), previously 21 x 15 mm  A more peripheral focus also in the left upper lobe measures 13 x 12 mm (series 3, image 43), previously 13 x 13 mm  Nodularity in the right costophrenic sulcus measures 16 x 12 mm in transverse dimensions (series 3, image 98), previously 15 x 11 mm  A left lower lobe pleural nodule measures 10 x 8 mm in greatest transverse dimensions (series 3, image 90), previously 9 x 8 mm    A 4 mm right lower lobe pulmonary nodule is stable (series 3, image 79)  There are no new pulmonary nodules  Right middle lobar atelectasis is presumed secondary to radiation fibrosis, stable  There are mild new bibasilar areas of mild groundglass opacity, nonspecific  A small amount of debris is present in the upper trachea  The endobronchial tree is under otherwise normal  PLEURA:  Unremarkable  HEART/GREAT VESSELS:  Atherosclerotic changes are noted in thoracic aorta and coronary arteries  MEDIASTINUM AND ANABEL:  Unremarkable  CHEST WALL AND LOWER NECK:   Unremarkable  VISUALIZED STRUCTURES IN THE UPPER ABDOMEN:  Redemonstrated is the incompletely characterized cystic mass in the body of the pancreas, measuring 30 x 30 mm in greatest transverse dimensions (series 2, image 61), previously 30 x 29 mm  OSSEOUS STRUCTURES:  Spinal degenerative changes are noted  No acute fracture or destructive osseous lesion  Sclerosis of the left scapular body corresponding to known metastatic disease is stable  Impression: 1  Stable pulmonary nodules  2   Interval development of mild bibasilar ground glass opacities  Though nonspecific, aspiration could be a possible source  Continued follow-up on subsequent oncologic surveillance is recommended  3   Stable size of the incompletely characterized cystic pancreatic mass  Once again, MRI utilizing a pancreatic mass protocol is recommended for complete characterization  The study was marked in EPIC for significant notification  Workstation performed: [id number]     Nm Pet Ct Skull Base To Mid Thigh    Result Date: 3/12/2021  Narrative: PET/CT SCAN INDICATION:  C34 12:  Malignant neoplasm of upper lobe, left bronchus or lung C34 90: Malignant neoplasm of unspecified part of unspecified bronchus or lung   , restaging for treatment management, history of radiation to left scapula for metastasis, history of endometrial cancer, status post RICHARD/BSO 2000, Covid vaccination 1 in left arm 2/20/2021 MODIFIER: PS COMPARISON: CT chest 2/22/2021 and priors, including PET CT 10/20/2018 CELL TYPE:  Adenocarcinoma, T10 bone biopsy 10/18/2016 TECHNIQUE:   10 5 mCi F-18-FDG administered IV  Multiplanar attenuation corrected and non attenuation corrected PET images were acquired 60 minutes post injection  Contiguous, low dose, axial CT sections were obtained from the skull base through the femurs   Intravenous contrast material was not utilized  This examination, like all CT scans performed in the [facility], was performed utilizing techniques to minimize radiation dose exposure, including the use of iterative reconstruction and automated exposure control  Fasting serum glucose: 109 mg/dl FINDINGS: VISUALIZED BRAIN:   No acute abnormalities are seen  HEAD/NECK:   There is a physiologic distribution of FDG  No FDG avid cervical adenopathy is seen  CT images: Scattered sinus mucosal thickening  CHEST:   Dominant left upper lung lesion image 3/84 has slowly increased in size and FDG intensity since the prior PET/CT, currently measuring approximately 2 7 x 1 5 cm, SUV 4 7  Prior PET/CT measurement 2 9 x 1 3 cm, SUV 2 4  This is most concerning for viable tumor  Enlarging adjacent left upper lung nodule image 3/83 measuring 1 2 x 1 1 cm  Prior PET/CT measurement 3 mm  This does not demonstrate significant FDG uptake, SUV 0 9  However a low-grade neoplasm remains possible  Persistent nodular infiltrate in the right medial lung base  This also demonstrates slow interval growth since prior studies  This currently measures 2 8 x 1 3 cm on image 3/110  SUV measures 4 8  Prior PET/CT measurement approximately 2 8 x 1 cm, though SUV measurement is limited due to adjacent liver activity, 2 3  This is nonspecific and may be inflammatory/infectious, with neoplasm not entirely excluded  Left lung base nodule measuring 8 x 7 mm image 3/109 appears stable and does not demonstrate significant FDG uptake    This would favor a benign etiology  CT images: Coronary atherosclerosis  Stable thickening along the right minor fissure  ABDOMEN: Enlarging pancreatic cystic lesion without significant FDG uptake  This measures 3 3 x 2 7 cm  Prior PET/CT measurement 1 5 x 1 5 cm  Bowel activity is likely physiologic  No FDG avid soft tissue lesions are seen  CT images: Cholecystectomy  Right renal cortical scarring  PELVIS: No FDG avid soft tissue lesions are seen  CT images: Hysterectomy  OSSEOUS STRUCTURES: Left scapular lytic lesion again noted with significantly decreased FDG uptake, SUV 2 4, compatible with treated metastasis  Prior SUV 14 7  No new FDG avid lesions are seen  CT images: Spine degenerative change  Mild lumbar levoscoliosis  Impression: 1  Slowly enlarging dominant left upper lung lesion with increasing FDG uptake, most concerning for viable tumor  2   Enlarging adjacent left upper lung nodule  Although this does not demonstrate significant FDG uptake, this remains concerning for a low-grade neoplasm  Consider tissue sampling if this would alter clinical management  3   Slowly increasing nodular infiltrate in the right medial lung base with increased FDG uptake  This may be inflammatory/infectious, with neoplasm also not excluded  Close follow-up or tissue sampling also suggested  4   Enlarging pancreatic cystic lesion without significant FDG uptake  Dedicated contrast MR evaluation suggested for further evaluation  The study was marked in EPIC for significant notification  Workstation performed: [id number]        ASSESSMENT  1  Secondary malignant neoplasm of bone and bone marrow ([facility] [address] )     2  Malignant neoplasm of bone with metastases [facility])  Ambulatory referral to Radiation Oncology     Cancer Staging  No matching staging information was found for the patient  PLAN/DISCUSSION  No orders of the defined types were placed in this encounter           Billy Cool is a 65y o  year old female with a history of stage IV non-small cell lung cancer diagnosed initially in 2016, with a rather indolent disease course, currently with minimal systemic disease burden  She is currently on a break from systemic therapy  She has been referred for consideration of lung stereotactic radiation  There is currently a dominant left upper lobe lesion which has been very slowly increasing in size over the past few years with an SUV of 4 7 compared to an SUV of 2 4 on her last PET-CT which was actually 2018  This lesion measures approximately 2 7 x 1 5 cm although is somewhat poorly demarcated  There is also an adjacent lesion, lateral to the above described lesion, ground-glass in appearance, slowly enlarging over the past few years as well with minimal hypermetabolic activity, possibly representing a distinct low grade neoplasm  In mid 2018 this measured approximately 3 mm in largest dimension, now measuring 1 3 cm in largest dimension  Her case was recently presented at the multidisciplinary working group and the recommendation was for stereotactic radiation directed at the enlarging more lateral ground-glass nodule  Given the close proximity to the PET avid more medial left upper lobe disease, we would consider treating both of these lesions simultaneously as it would likely be more challenging to treat the lateral lesion now and then potentially have to treat the more medial lesion later  Given that she is on a break from systemic therapy with no definite plans to resume systemic therapy, and the high likelihood of viable malignancy in the more medial PET avid lesion, including the medial lesion at this time would be reasonable, assuming normal tissue constraints can be maintained  A decision regarding treatment of 1 lesion versus both lesions would be made once CT planning has been performed    The associated risks and toxicities of treatment were discussed with the patient in detail, including, not limited to, fatigue, shortness of breath, chronic chest wall discomfort, rib fracture, parenchymal lung scarring, and radiation pneumonitis  She will undergo CT planning at the [address] [place] [address] with treatment to be delivered at [facility]  Samual Gilford, MD  3/17/2021,11:36 AM      Portions of the record may have been created with voice recognition software  Occasional wrong word or "sound a like" substitutions may have occurred due to the inherent limitations of voice recognition software  Read the chart carefully and recognize, using context, where substitutions have occurred

## 2021-03-17 NOTE — PROGRESS NOTES
Juan Jose 1950 is a 65 y o  female    Oncology History Overview Note   Patient presents for radiation consult for Stage IV lung cancer, referred by Dr Sandra Hurtado to discuss SBRT to HUNTRE of lung  65year old female with a history of Stage IV lung cancer diagnosed in 2016 with bone metastases  She was treated with systemic therapy  She received radiation therapy to T9-T11 spine in 13 fractions in 2016  She also received radiation to left scapula in October 2017 (3000 cGy) and re-irradiation to left scapula (2000 cGy) in November 2018  She then continued on systemic therapy  Last follow-up in Radiation Oncology on 12/31/18, she was discharged to continue following with Medical Oncology  November 2018 - March 2021  Continued systemic therapy with maintenance Alimta    2/22/21 CT chest wo contrast  1  Stable pulmonary nodules  2   Interval development of mild bibasilar ground glass opacities  Though nonspecific, aspiration could be a possible source  Continued follow-up on subsequent oncologic surveillance is recommended  3   Stable size of the incompletely characterized cystic pancreatic mass  Once again, MRI utilizing a pancreatic mass protocol is recommended for complete characterization  3/1/21 Med Onc, Dr Sandra Hurtado follow-up  CT scan of the chest showed stable left lobe lung nodules, however she has progressive chronic renal insufficiency, hold Alimta at this time  Refer to Radiation Oncology to discuss SBRT to the left upper lobe lung nodule   She will continue to follow with nephrology  F/U in 2 months with CT chest    Discuss case at thoracic tumor conference  3/8/21 Multidisciplinary Thoracic Oncology Tumor Conference:  Disease has been stable until recent chest CT which demonstrated the main left upper lobe lesion as stable; however there is slight progression of a peripheral ground glass lesion in the left upper lobe from November to February   It measures 1 3x1 2cm from 1  0x1 2cm and has increased density  Her creatinine is elevated so she cannot have further Alimta therapy  The group's recommendation is referral to radiation oncology for SBRT to this nodule  3/12/21 PET/CT  1  Slowly enlarging dominant left upper lung lesion with increasing FDG uptake, most concerning for viable tumor  2   Enlarging adjacent left upper lung nodule  Although this does not demonstrate significant FDG uptake, this remains concerning for a low-grade neoplasm  Consider tissue sampling if this would alter clinical management  3   Slowly increasing nodular infiltrate in the right medial lung base with increased FDG uptake  This may be inflammatory/infectious, with neoplasm also not excluded  Close follow-up or tissue sampling also suggested  4   Enlarging pancreatic cystic lesion without significant FDG uptake  Dedicated contrast MR evaluation suggested for further evaluation  3/12/21 Dr Sarah Suh nurse contacted pt to discuss Dr Sarah Suh recommendations based on PET/CT, refer to Rad Onc for lung lesion and Dr Amari Carrillo for pancreatic cystic lesion  Pt agreeable  3/23/21 Dr Amari Carrillo, Surg Onc  4/8/21 Nephrology follow-up  4/19/21 Palliative care   4/28/21 CT chest wo contrast  5/3/21 Dr Anamaria Oliver follow-up       Adenocarcinoma of lung, stage 4 ([address] )   10/18/2016 Initial Diagnosis    Adenocarcinoma of lung, stage 4 ([address] )     10/18/2016 Biopsy    Final Diagnosis  A  Bone, T10, biopsy:     - Non-small cell carcinoma with features suggesting adenocarcinoma; see note           11/7/2016 - 12/1/2016 Radiation    Course 1: T9 - T11 SPINE  13 fractions   Total Dose = 3,250 cGy     11/17/2016 Biopsy    Final Diagnosis  Lymph Node, Level 7: Conclusive evidence of Malignancy  Poorly differentiated non-small cell carcinoma      Lung, right main stem bronchus, biopsy:              - Poorly differentiated adenocarcinoma       12/15/2016 - 6/1/2017 Chemotherapy    Pembrolizumab 200 mg IV flat dose every 3 weeks      9/13/2017 Biopsy    Bone, left scapula, biopsy:  -  Positive for malignancy, consistent with metastatic adenocarcinoma          10/3/2017 - 10/16/2017 Radiation    palliative course of radiation therapy to the left scapular lesion to 3000 cGy( metastatic from adenocarcinoma of the lung)          4/10/2018 - 10/9/2018 Chemotherapy    nivolumab with prednisone 10 mg p o     11/6/2018 -  Chemotherapy    11/6/18   Alimta 500 milligram/meter squared, carboplatin AUC 5 every 3 weeks,     11/6/2018 - 3/1/2021 Chemotherapy    cyanocobalamin injection 1,000 mcg, 1,000 mcg, Intramuscular, Once, 6 of 10 cycles  Administration: 1,000 mcg (1/17/2020), 1,000 mcg (6/10/2020), 1,000 mcg (10/14/2020), 1,000 mcg (1/5/2021)  fosaprepitant (EMEND) 150 mg in sodium chloride 0 9 % 250 mL IVPB, 150 mg, Intravenous, Once, 1 of 6 cycles  Administration: 150 mg (2/2/2021)  PEMEtrexed (ALIMTA) 1,020 mg in sodium chloride 0 9 % 100 mL chemo infusion, 500 mg/m2 = 1,020 mg, Intravenous, Once, 32 of 37 cycles  Dose modification: 400 mg/m2 (original dose 500 mg/m2, Cycle 23, Reason: Other (See Comments), Comment: decreasing crcl), 300 mg/m2 (original dose 500 mg/m2, Cycle 26, Reason: Treatment Parameters Not Met), 300 mg/m2 (original dose 500 mg/m2, Cycle 30, Reason: Max Dose Reached)  Administration: 1,020 mg (5/24/2019), 1,000 mg (6/14/2019), 1,000 mg (7/5/2019), 1,000 mg (8/2/2019), 1,000 mg (8/23/2019), 1,000 mg (9/13/2019), 1,000 mg (10/4/2019), 1,000 mg (10/25/2019), 1,000 mg (11/15/2019), 1,000 mg (12/6/2019), 1,000 mg (12/27/2019), 1,000 mg (1/17/2020), 1,000 mg (2/7/2020), 800 mg (5/22/2020), 800 mg (6/10/2020), 800 mg (7/1/2020), 600 mg (7/29/2020), 800 mg (8/19/2020), 600 mg (11/11/2020), 600 mg (1/5/2021), 600 mg (2/2/2021), 600 mg (10/14/2020)     11/12/2018 - 11/26/2018 Radiation    Course: C3: Left Scapula retreat  10 fractions  2,000 cGy         Malignant neoplasm of unspecified part of right bronchus or lung ([address] ) (Resolved)   5/24/2017 Initial Diagnosis    Malignant neoplasm of unspecified part of right bronchus or lung ([address] ) (Resolved)     Malignant neoplasm of bone with metastases ([facility] )   5/24/2017 Initial Diagnosis    Malignant neoplasm of bone with metastases ([facility])         Clinical Trial: no      Health Maintenance   Topic Date Due    BMI: Followup Plan  01/08/2021    DM Eye Exam  05/09/2021    HEMOGLOBIN A1C  06/07/2021    Fall Risk  12/10/2021    Medicare Annual Wellness Visit (AWV)  12/10/2021    Depression Remission PHQ  12/10/2021    Diabetic Foot Exam  12/10/2021    BMI: Adult  03/01/2022    Colorectal Cancer Screening  04/30/2028    DTaP,Tdap,and Td Vaccines (2 - Td) 10/06/2028    Hepatitis C Screening  Completed    Pneumococcal Vaccine: 65+ Years  Completed    Influenza Vaccine  Completed    COVID-19 Vaccine  Completed    HIB Vaccine  Aged Out    Hepatitis B Vaccine  Aged Out    IPV Vaccine  Aged Out    Hepatitis A Vaccine  Aged Out    Meningococcal ACWY Vaccine  Aged Out    HPV Vaccine  Aged Out       Past Medical History:   Diagnosis Date    Arthritis     Atrial fibrillation ([facility] )     Diabetes mellitus ([facility] )     Diabetes mellitus type 2, uncomplicated ([facility] )     Last assessed: 8/17/17    Essential hypertension     Frozen shoulder     L shoulder    GERD (gastroesophageal reflux disease)     Hx of cancer of uterus     Last assessed: 8/21/15    Hyperlipidemia     Hyponatremia 11/16/2016    Lung mass     diagnosed 9/2016    Malignant neoplasm without specification of site ([facility] )     Skin cancer, basal cell     right eye area    Stage 4 lung cancer ([facility] )        Past Surgical History:   Procedure Laterality Date    APPENDECTOMY      BRONCHOSCOPY N/A 11/17/2016    Procedure: BRONCHOSCOPY FLEXIBLE;  Surgeon: Martha Ambrose MD;  [facility]: [facility];  Service:    Daily Siddiqui CHOLECYSTECTOMY      COLONOSCOPY      GALLBLADDER SURGERY      HYSTERECTOMY  2000    Total abdominal    LARYNGOSCOPY      Flexible Fiberoptic, (Therapeutic), Resolved: 16    MOHS SURGERY      Micrographic Surgery Face    UT BRONCHOSCOPY NEEDLE BX TRACHEA MAIN STEM&/BRON N/A 2016    Procedure: EBUS; FROZEN SECTION ;  Surgeon: Vickie More MD;  Location: [facility];  Service: Thoracic    [address] N/A 2017    Procedure: Madisyn Becerril;  Surgeon: Yessi Aly MD;  Location: [facility];   Service: Pulmonary    TONSILECTOMY AND ADNOIDECTOMY      TOTAL KNEE ARTHROPLASTY Right 2014       Family History   Problem Relation Age of Onset    Heart disease Father         cardiac disorder    Hypertension Father     Arthritis Father     Stroke Father         cerebrovascular accident   Rocco Pila Diabetes Mother     Heart disease Mother    Rocco Pila Dementia Mother     Hypertension Mother     Thyroid disease Mother     Cancer Maternal Grandfather         of unknown origin    Cancer Family     Depression Family     Diabetes Family     Hyperlipidemia Family         essential    Heart disease Family     Hypertension Family     Stroke Family         syndrome    Lung cancer Paternal Uncle     Muscular dystrophy Brother        Social History     Tobacco Use    Smoking status: Former Smoker     Packs/day: 1 00     Years: 50 00     Pack years: 50 00     Types: Cigarettes     Start date:      Quit date:      Years since quittin 2    Smokeless tobacco: Never Used    Tobacco comment: Quit in    Substance Use Topics    Alcohol use: No    Drug use: No          Current Outpatient Medications:     acetaminophen (TYLENOL) 325 mg tablet, Take 650 mg by mouth every 6 (six) hours as needed for mild pain, Disp: , Rfl:     apixaban (Eliquis) 5 mg, TAKE 1 TABLET TWICE A DAY, Disp: 180 tablet, Rfl: 3    Calcium Carb-Cholecalciferol 600-800 MG-UNIT TABS, Take 1 tablet by mouth every other day, Disp: , Rfl:     Cholecalciferol (VITAMIN D) 2000 units CAPS, Take by mouth, Disp: , Rfl:     cyanocobalamin (VITAMIN B-12) 100 mcg tablet, Take by mouth daily, Disp: , Rfl:     Folic Acid 0 8 MG CAPS, , Disp: , Rfl:     furosemide (LASIX) 40 mg tablet, Take 1 tablet (40 mg total) by mouth as needed (leg swelling, fluid overload), Disp: , Rfl:     linaGLIPtin (Tradjenta) 5 MG TABS, Take 5 mg by mouth daily, Disp: 90 tablet, Rfl: 3    losartan (COZAAR) 50 mg tablet, Take 1 tablet (50 mg total) by mouth daily, Disp: 90 tablet, Rfl: 3    metFORMIN (GLUCOPHAGE) 1000 MG tablet, Take 1 tablet (1,000 mg total) by mouth 2 (two) times a day with meals, Disp: 180 tablet, Rfl: 3    metoclopramide (REGLAN) 10 mg tablet, TAKE 1/2 TABLET (5MG TOTAL)3 TIMES A DAY BEFORE MEALS AS NEEDED FOR NAUSEA, Disp: 60 tablet, Rfl: 0    metoprolol tartrate (LOPRESSOR) 25 mg tablet, Take 1 tablet (25 mg total) by mouth every 12 (twelve) hours, Disp: 180 tablet, Rfl: 3    omeprazole (PriLOSEC) 20 mg delayed release capsule, Take 1 capsule (20 mg total) by mouth daily, Disp: 90 capsule, Rfl: 3    ondansetron (ZOFRAN-ODT) 4 mg disintegrating tablet, Take 1 tablet (4 mg total) by mouth every 6 (six) hours as needed for nausea or vomiting (Patient taking differently: Take 4 mg by mouth every 6 (six) hours as needed for nausea or vomiting ), Disp: 30 tablet, Rfl: 3    potassium chloride (K-DUR,KLOR-CON) 20 mEq tablet, Take 2 tablets (40 mEq total) by mouth daily as needed (low K+), Disp: , Rfl:     sotalol (BETAPACE) 80 mg tablet, Take 1 tablet (80 mg total) by mouth 2 (two) times a day 1 tab PO BID, Disp: 180 tablet, Rfl: 3    venlafaxine (EFFEXOR) 37 5 mg tablet, TAKE 1 TABLET DAILY, Disp: 90 tablet, Rfl: 3    dexamethasone (DECADRON) 4 mg tablet, TAKE 1 TABLET TWICE A DAY WITH FOOD, ON DAY BEFORE CHEMOTHERAPY, DAY OF CHEMOTHERAPY AND DAY AFTER CHEMOTHERAPY (Patient not taking: Reported on 3/17/2021), Disp: 90 tablet, Rfl: 1    doxazosin (CARDURA) 2 mg tablet, Take 1 tablet (2 mg total) by mouth daily at bedtime (Patient not taking: Reported on 1/21/2021), Disp: 30 tablet, Rfl: 5    Allergies   Allergen Reactions    Amoxicillin Rash and Hives    Cardizem [Diltiazem] Rash     Rash      Statins Myalgia     Severe muscle aching  Terrible pains        Review of Systems:  Review of Systems   Constitutional: Positive for fatigue (tired but feeling good)  HENT: Negative  Eyes: Negative  Respiratory: Negative  Cardiovascular: Positive for leg swelling (left leg, taking lasix only prn)  Gastrointestinal: Negative  Endocrine: Negative  Genitourinary: Negative  Musculoskeletal: Positive for arthralgias (left knee, arthritic pains)  Skin: Negative  Allergic/Immunologic: Negative  Neurological: Negative  Hematological: Negative  Psychiatric/Behavioral: Negative  Vitals:    03/17/21 0900   BP: 120/64   BP Location: Left arm   Pulse: 64   Resp: 18   Temp: 97 7 °F (36 5 °C)   TempSrc: Temporal   SpO2: 97%   Weight: 88 5 kg (195 lb)           PFT - last PFTs on 5/19/2017    Spirometry:  FEV1/FVC Ratio is 77  FEV1 is 1 87L which is 77% predicted  FVC is 2 43L which is 76% predicted  There is no significant postbronchodilator improvement in FEV1 or FVC    Flow volume loop:  Normal   Lung volumes: Total lung capacity is 5 20L which is 100% predicted  Residual volume is 116% predicted    Diffusing capacity:  39% predicted     IMPRESSION:  · Normal spirometry   · Normal lung volumes   · Severely Reduced Diffusion     Isolated reduction in diffusion can be consistent with heart failure, pulmonary hypertension or early interstitial lung disease  Imaging: No images are attached to the encounter       Teaching: [facility] radiation packet, lung SBRT    MST: completed    Implantable Devices (Port, Pacemaker, pain stimulator): no    Hip Replacement: no

## 2021-03-19 NOTE — TELEPHONE ENCOUNTER
After several phone calls to [facility] I reached a pharmacist at [facility] [place]: [phone], who was able to transfer the patients refilll for effexor to the [facility] [address] [place] account  She will need to call the above number to ask for her refill  I called the patient to inform her of above

## 2021-03-22 ENCOUNTER — APPOINTMENT (OUTPATIENT)
Dept: RADIATION ONCOLOGY | Facility: HOSPITAL | Age: 71
End: 2021-03-22
Attending: RADIOLOGY
Payer: MEDICARE

## 2021-03-22 PROCEDURE — 77470 SPECIAL RADIATION TREATMENT: CPT | Performed by: RADIOLOGY

## 2021-03-22 PROCEDURE — 77334 RADIATION TREATMENT AID(S): CPT | Performed by: RADIOLOGY

## 2021-03-23 ENCOUNTER — CONSULT (OUTPATIENT)
Dept: SURGICAL ONCOLOGY | Facility: CLINIC | Age: 71
End: 2021-03-23
Payer: MEDICARE

## 2021-03-23 VITALS
BODY MASS INDEX: 33.63 KG/M2 | HEIGHT: 64 IN | RESPIRATION RATE: 16 BRPM | TEMPERATURE: 97.8 F | SYSTOLIC BLOOD PRESSURE: 142 MMHG | HEART RATE: 60 BPM | DIASTOLIC BLOOD PRESSURE: 80 MMHG | WEIGHT: 197 LBS | OXYGEN SATURATION: 98 %

## 2021-03-23 DIAGNOSIS — K86.2 CYST OF PANCREAS: Primary | ICD-10-CM

## 2021-03-23 PROCEDURE — 99205 OFFICE O/P NEW HI 60 MIN: CPT | Performed by: STUDENT IN AN ORGANIZED HEALTH CARE EDUCATION/TRAINING PROGRAM

## 2021-03-23 NOTE — PROGRESS NOTES
Surgical Oncology Consultation    [address] [facility]  [facility]  [address]  [facility] [address]    Patient:  Gómez Hobson  1950  [id number]    Primary Care provider:  Yuliet Dash, [address] [facility]  [facility] [address]  [address] [facility] [address]    Referring provider: Paris Sanchez MD  [address] [facility]  [address] [facility],  [address] [place] [address]    Diagnoses and all orders for this visit:    Cyst of pancreas  -     MRI abdomen w wo contrast and mrcp; Future        Chief Complaint   Patient presents with    Consult       Return in about 3 weeks (around 4/13/2021)  Oncology History   Adenocarcinoma of lung, stage 4 ([facility] )   10/18/2016 Initial Diagnosis    Adenocarcinoma of lung, stage 4 ([facility] )     10/18/2016 Biopsy    Final Diagnosis  A  Bone, T10, biopsy:     - Non-small cell carcinoma with features suggesting adenocarcinoma; see note           11/7/2016 - 12/1/2016 Radiation    Course 1: T9 - T11 SPINE  13 fractions   Total Dose = 3,250 cGy     11/17/2016 Biopsy    Final Diagnosis  Lymph Node, Level 7: Conclusive evidence of Malignancy  Poorly differentiated non-small cell carcinoma      Lung, right main stem bronchus, biopsy:              - Poorly differentiated adenocarcinoma       12/15/2016 - 6/1/2017 Chemotherapy    Pembrolizumab 200 mg IV flat dose every 3 weeks      9/13/2017 Biopsy    Bone, left scapula, biopsy:  -  Positive for malignancy, consistent with metastatic adenocarcinoma          10/3/2017 - 10/16/2017 Radiation    palliative course of radiation therapy to the left scapular lesion to 3000 cGy( metastatic from adenocarcinoma of the lung)          4/10/2018 - 10/9/2018 Chemotherapy    nivolumab with prednisone 10 mg p o     11/6/2018 -  Chemotherapy    11/6/18   Alimta 500 milligram/meter squared, carboplatin AUC 5 every 3 weeks,     11/6/2018 - 3/1/2021 Chemotherapy    cyanocobalamin injection 1,000 mcg, 1,000 mcg, Intramuscular, Once, 6 of 10 cycles  Administration: 1,000 mcg (1/17/2020), 1,000 mcg (6/10/2020), 1,000 mcg (10/14/2020), 1,000 mcg (1/5/2021)  fosaprepitant (EMEND) 150 mg in sodium chloride 0 9 % 250 mL IVPB, 150 mg, Intravenous, Once, 1 of 6 cycles  Administration: 150 mg (2/2/2021)  PEMEtrexed (ALIMTA) 1,020 mg in sodium chloride 0 9 % 100 mL chemo infusion, 500 mg/m2 = 1,020 mg, Intravenous, Once, 32 of 37 cycles  Dose modification: 400 mg/m2 (original dose 500 mg/m2, Cycle 23, Reason: Other (See Comments), Comment: decreasing crcl), 300 mg/m2 (original dose 500 mg/m2, Cycle 26, Reason: Treatment Parameters Not Met), 300 mg/m2 (original dose 500 mg/m2, Cycle 30, Reason: Max Dose Reached)  Administration: 1,020 mg (5/24/2019), 1,000 mg (6/14/2019), 1,000 mg (7/5/2019), 1,000 mg (8/2/2019), 1,000 mg (8/23/2019), 1,000 mg (9/13/2019), 1,000 mg (10/4/2019), 1,000 mg (10/25/2019), 1,000 mg (11/15/2019), 1,000 mg (12/6/2019), 1,000 mg (12/27/2019), 1,000 mg (1/17/2020), 1,000 mg (2/7/2020), 800 mg (5/22/2020), 800 mg (6/10/2020), 800 mg (7/1/2020), 600 mg (7/29/2020), 800 mg (8/19/2020), 600 mg (11/11/2020), 600 mg (1/5/2021), 600 mg (2/2/2021), 600 mg (10/14/2020)     11/12/2018 - 11/26/2018 Radiation    Course: C3: Left Scapula retreat  10 fractions  2,000 cGy         Malignant neoplasm of unspecified part of right bronchus or lung ([facility] ) (Resolved)   5/24/2017 Initial Diagnosis    Malignant neoplasm of unspecified part of right bronchus or lung (HCC) (Resolved)     Malignant neoplasm of bone with metastases ([facility] [address] )   5/24/2017 Initial Diagnosis    Malignant neoplasm of bone with metastases (HCC)         History of Present Illness  :  Ms Sharon Valdivia is a 66 yo female diagnosed with metastatic lung cancer several years ago; has been treated with XRT and systemic therapy, with overall stable disease but recent progression of a lung nodule with planned SBRT  Was most recently on nivo but therapy held 2/2 renal insufficiency   Most recent PET demonstrated non avid 3 3 cm mid body panc cyst, which has increased in size since PET 2018 when it was 1 5 cm  Reviewing CT from July, the cyst has not changed in size  Pt is asymptomatic  Feels well with no abd pain, no weight loss recently, no new onset faigue, no hx jaundice  No hx pancreatitis; hx cholecystectomy many years ago with what she describes as continued intermitent RUQ pain  No DM, no symptoms insufficiency, no RFs for pancreatitis such as alcohol use  I personally reviewed the patient's oncology history, recent treatment and med onc progress notes, PET and CT images  Review of Systems  Complete ROS Surg Onc:   Constitutional: The patient denies new or recent history of general fatigue, no recent weight loss, no change in appetite  Eyes: No complaints of visual problems, no scleral icterus  ENT: No complaints of ear pain, no hoarseness, no difficulty swallowing,  no tinnitus and no new masses in head, oral cavity, or neck  Cardiovascular: No complaints of chest pain, no palpitations, no ankle edema  Respiratory: No complaints of shortness of breath, no cough  Gastrointestinal: No complaints of jaundice, no bloody stools, no pale stools  Genitourinary: No complaints of dysuria, no hematuria, no nocturia, no frequent urination, no urethral discharge  Musculoskeletal: No complaints of weakness, paralysis, joint stiffness or arthralgias  Integumentary: No complaints of rash, no new lesions  Neurological: No complaints of convulsions, no seizures, no dizziness  Hematologic/Lymphatic: No complaints of easy bruising  Endocrine:  No hot or cold intolerance  No polydipsia, polyphagia, or polyuria  Allergy/immunology:  No environmental allergies  No food allergies  Not immunocompromised  Skin:  No pallor or rash  No wound        Patient Active Problem List   Diagnosis    Paroxysmal atrial fibrillation (HCC)    BMI 33 0-33 9,adult    Adenocarcinoma of lung, stage 4 (HCC)    Chronic diastolic heart failure (HCC)    Cancer related pain    Atrial fibrillation (HCC)    Benign hypertension with CKD [facility]chronic kidney disease) [facility] III [facility]    Diabetes mellitus type 2, uncomplicated ([facility] )    Malignant neoplasm of bone with metastases (HCC)    Acid reflux    Hyperlipidemia    Insomnia    Major depressive disorder with single episode, in full remission ([facility] )    Vitamin D deficiency    Adhesive capsulitis of shoulder    Osteoarthritis of knee    Anxiety    Lung nodule, multiple    Hypertensive heart disease with congestive heart failure ([facility] [facility] )    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)    Other fatigue    CINV (chemotherapy-induced nausea and vomiting)    Cyst of pancreas    Caregiver stress    Localized swelling of left foot    Pneumonitis    Anemia in stage 3 chronic kidney disease    Palliative care patient    Stage 3b chronic kidney disease    Persistent proteinuria    Hypercalcemia    Medicare annual wellness visit, subsequent     Past Medical History:   Diagnosis Date    Arthritis     Atrial fibrillation ([facility]     Diabetes mellitus ([facility] )     Diabetes mellitus type 2, uncomplicated ([facility] )     Last assessed: 8/17/17    Essential hypertension     Frozen shoulder     L shoulder    GERD (gastroesophageal reflux disease)     Hx of cancer of uterus     Last assessed: 8/21/15    Hyperlipidemia     Hyponatremia 11/16/2016    Lung mass     diagnosed 9/2016    Malignant neoplasm without specification of site ([place] [address] )     Skin cancer, basal cell     right eye area    Stage 4 lung cancer ([facility] )      Past Surgical History:   Procedure Laterality Date    APPENDECTOMY      BRONCHOSCOPY N/A 11/17/2016    Procedure: BRONCHOSCOPY FLEXIBLE;  Surgeon: Becky Bennett MD;  [facility];  Service:    Todd Ok CHOLECYSTECTOMY      COLONOSCOPY      GALLBLADDER SURGERY      HYSTERECTOMY  2000    Total abdominal    LARYNGOSCOPY      Flexible Fiberoptic, (Therapeutic), Resolved: 16    MOHS SURGERY      Micrographic Surgery Face    OK BRONCHOSCOPY NEEDLE BX TRACHEA MAIN STEM&/BRON N/A 2016    Procedure: EBUS; FROZEN SECTION ;  Surgeon: Miles Linton MD;  [facility];  Service: Thoracic    [address] N/A 2017    Procedure: Nicki Barajas;  Surgeon: Ariel Brown MD;  Location: [facility]; Service: Pulmonary    TONSILECTOMY AND ADNOIDECTOMY      TOTAL KNEE ARTHROPLASTY Right 2014     Family History   Problem Relation Age of Onset    Heart disease Father         cardiac disorder    Hypertension Father     Arthritis Father     Stroke Father         cerebrovascular accident    Skin cancer Father     Diabetes Mother     Heart disease Mother    Beau Waller Dementia Mother     Hypertension Mother     Thyroid disease Mother     Cancer Maternal Grandfather         of unknown origin    Cancer Family     Depression Family     Diabetes Family     Hyperlipidemia Family         essential    Heart disease Family     Hypertension Family     Stroke Family         syndrome    Lung cancer Paternal Uncle     Muscular dystrophy Brother      Social History     Socioeconomic History    Marital status:       Spouse name: Not on file    Number of children: Not on file    Years of education: Not on file    Highest education level: Not on file   Occupational History    Occupation: retired   Social Needs    Financial resource strain: Not on file    Food insecurity     Worry: Not on file     Inability: Not on file   [employer] needs     Medical: Not on file     Non-medical: Not on file   Tobacco Use    Smoking status: Former Smoker     Packs/day: 1 00     Years: 50 00     Pack years: 50 00     Types: Cigarettes     Start date:      Quit date:      Years since quittin 2    Smokeless tobacco: Never Used    Tobacco comment: Quit in    Substance and Sexual Activity    Alcohol use: No    Drug use: No    Sexual activity: Not Currently   Lifestyle    Physical activity     Days per week: Not on file     Minutes per session: Not on file    Stress: Not on file   Relationships    Social connections     Talks on phone: Not on file     Gets together: Not on file     Attends Latter day service: Not on file     Active member of club or organization: Not on file     Attends meetings of clubs or organizations: Not on file     Relationship status: Not on file    Intimate partner violence     Fear of current or ex partner: Not on file     Emotionally abused: Not on file     Physically abused: Not on file     Forced sexual activity: Not on file   Other Topics Concern    Not on file   Social History Narrative    Lives with family  Cares for her brother who is disabled      Daily caffeinated coffee consumption       Current Outpatient Medications:     acetaminophen (TYLENOL) 325 mg tablet, Take 650 mg by mouth every 6 (six) hours as needed for mild pain, Disp: , Rfl:     apixaban (Eliquis) 5 mg, TAKE 1 TABLET TWICE A DAY, Disp: 180 tablet, Rfl: 3    Calcium Carb-Cholecalciferol 600-800 MG-UNIT TABS, Take 1 tablet by mouth every other day, Disp: , Rfl:     Cholecalciferol (VITAMIN D) 2000 units CAPS, Take by mouth, Disp: , Rfl:     cyanocobalamin (VITAMIN B-12) 100 mcg tablet, Take by mouth daily, Disp: , Rfl:     Folic Acid 0 8 MG CAPS, , Disp: , Rfl:     furosemide (LASIX) 40 mg tablet, Take 1 tablet (40 mg total) by mouth as needed (leg swelling, fluid overload), Disp: , Rfl:     linaGLIPtin (Tradjenta) 5 MG TABS, Take 5 mg by mouth daily, Disp: 90 tablet, Rfl: 3    losartan (COZAAR) 50 mg tablet, Take 1 tablet (50 mg total) by mouth daily, Disp: 90 tablet, Rfl: 3    metFORMIN (GLUCOPHAGE) 1000 MG tablet, Take 1 tablet (1,000 mg total) by mouth 2 (two) times a day with meals, Disp: 180 tablet, Rfl: 3    metoclopramide (REGLAN) 10 mg tablet, TAKE 1/2 TABLET (5MG TOTAL)3 TIMES A DAY BEFORE MEALS AS NEEDED FOR NAUSEA, Disp: 60 tablet, Rfl: 0    metoprolol tartrate (LOPRESSOR) 25 mg tablet, Take 1 tablet (25 mg total) by mouth every 12 (twelve) hours, Disp: 180 tablet, Rfl: 3    omeprazole (PriLOSEC) 20 mg delayed release capsule, Take 1 capsule (20 mg total) by mouth daily, Disp: 90 capsule, Rfl: 3    potassium chloride (K-DUR,KLOR-CON) 20 mEq tablet, Take 2 tablets (40 mEq total) by mouth daily as needed (low K+), Disp: , Rfl:     sotalol (BETAPACE) 80 mg tablet, Take 1 tablet (80 mg total) by mouth 2 (two) times a day 1 tab PO BID, Disp: 180 tablet, Rfl: 3    venlafaxine (EFFEXOR) 37 5 mg tablet, TAKE 1 TABLET DAILY, Disp: 90 tablet, Rfl: 3    dexamethasone (DECADRON) 4 mg tablet, TAKE 1 TABLET TWICE A DAY WITH FOOD, ON DAY BEFORE CHEMOTHERAPY, DAY OF CHEMOTHERAPY AND DAY AFTER CHEMOTHERAPY (Patient not taking: Reported on 3/17/2021), Disp: 90 tablet, Rfl: 1    doxazosin (CARDURA) 2 mg tablet, Take 1 tablet (2 mg total) by mouth daily at bedtime (Patient not taking: Reported on 1/21/2021), Disp: 30 tablet, Rfl: 5    ondansetron (ZOFRAN-ODT) 4 mg disintegrating tablet, Take 1 tablet (4 mg total) by mouth every 6 (six) hours as needed for nausea or vomiting (Patient not taking: Reported on 3/23/2021), Disp: 30 tablet, Rfl: 3  Allergies   Allergen Reactions    Amoxicillin Rash and Hives    Cardizem [Diltiazem] Rash     Rash      Statins Myalgia     Severe muscle aching  Terrible pains       Vitals:    03/23/21 0913   BP: 142/80   Pulse: 60   Resp: 16   Temp: 97 8 °F (36 6 °C)   SpO2: 98%       Physical Exam   General: Appears well, appears stated age  Skin: Warm, anicteric  HEENT: Normocephalic, atraumatic; sclera aniceteric, mucous membranes moist; cervical nodes without adenopathy  Cardiopulmonary: RRR, Easy WOB, no BLE edema  Abd: Flat and soft, nontender, no masses appreciated, no hepatosplenomegaly   Well healed lower midline and Pfan incision 2/2 previous hyst for endometrial cancer  MSK: Symmetric, no cyanosis, no overt weakness  Lymphatic: No cervical, axillary or inguinal lymphadenopathy  Neuro: Affect appropriate, no gross motor abnormalities      Pathology:  Reviewed lung pathology    Labs: Reviewed    Imaging  Ct Chest Wo Contrast    Result Date: 2/27/2021  Narrative: CT CHEST WITHOUT IV CONTRAST INDICATION:   C34 92: Malignant neoplasm of unspecified part of left bronchus or lung  Known history of stage IV adenocarcinoma of the lung with left scapular involvement  Status post radiation therapy to the left scapula  Currently maintained on immuno-oncologic therapy  Surveillance  COMPARISON:  CT, dated November 17, 2020  TECHNIQUE: CT examination of the chest was performed without intravenous contrast   Axial, sagittal, and coronal 2D reformatted images were created from the source data and submitted for interpretation  Radiation dose length product (DLP) for this visit:  478 mGy-cm   This examination, like all CT scans performed in the [facility], was performed utilizing techniques to minimize radiation dose exposure, including the use of iterative reconstruction and automated exposure control  FINDINGS: LUNGS:  Redemonstrated are multiple pulmonary nodules  The dominant focus in the left upper lobe measures approximately 22 x 15 mm in greatest transverse dimensions (series 3, image 41), previously 21 x 15 mm  A more peripheral focus also in the left upper lobe measures 13 x 12 mm (series 3, image 43), previously 13 x 13 mm  Nodularity in the right costophrenic sulcus measures 16 x 12 mm in transverse dimensions (series 3, image 98), previously 15 x 11 mm  A left lower lobe pleural nodule measures 10 x 8 mm in greatest transverse dimensions (series 3, image 90), previously 9 x 8 mm  A 4 mm right lower lobe pulmonary nodule is stable (series 3, image 79)  There are no new pulmonary nodules  Right middle lobar atelectasis is presumed secondary to radiation fibrosis, stable    There are mild new bibasilar areas of mild groundglass opacity, nonspecific  A small amount of debris is present in the upper trachea  The endobronchial tree is under otherwise normal  PLEURA:  Unremarkable  HEART/GREAT VESSELS:  Atherosclerotic changes are noted in thoracic aorta and coronary arteries  MEDIASTINUM AND ANABEL:  Unremarkable  CHEST WALL AND LOWER NECK:   Unremarkable  VISUALIZED STRUCTURES IN THE UPPER ABDOMEN:  Redemonstrated is the incompletely characterized cystic mass in the body of the pancreas, measuring 30 x 30 mm in greatest transverse dimensions (series 2, image 61), previously 30 x 29 mm  OSSEOUS STRUCTURES:  Spinal degenerative changes are noted  No acute fracture or destructive osseous lesion  Sclerosis of the left scapular body corresponding to known metastatic disease is stable  Impression: 1  Stable pulmonary nodules  2   Interval development of mild bibasilar ground glass opacities  Though nonspecific, aspiration could be a possible source  Continued follow-up on subsequent oncologic surveillance is recommended  3   Stable size of the incompletely characterized cystic pancreatic mass  Once again, MRI utilizing a pancreatic mass protocol is recommended for complete characterization  The study was marked in EPIC for significant notification  Workstation performed: [id number]     Nm Pet Ct Skull Base To Mid Thigh    Result Date: 3/12/2021  Narrative: PET/CT SCAN INDICATION:  C34 12:  Malignant neoplasm of upper lobe, left bronchus or lung C34 90: Malignant neoplasm of unspecified part of unspecified bronchus or lung   , restaging for treatment management, history of radiation to left scapula for metastasis, history of endometrial cancer, status post RICHARD/BSO 2000, Covid vaccination 1 in left arm 2/20/2021 MODIFIER: PS COMPARISON: CT chest 2/22/2021 and priors, including PET CT 10/20/2018 CELL TYPE:  Adenocarcinoma, T10 bone biopsy 10/18/2016 TECHNIQUE:   10 5 mCi F-18-FDG administered IV  Multiplanar attenuation corrected and non attenuation corrected PET images were acquired 60 minutes post injection  Contiguous, low dose, axial CT sections were obtained from the skull base through the femurs   Intravenous contrast material was not utilized  This examination, like all CT scans performed in the [facility], was performed utilizing techniques to minimize radiation dose exposure, including the use of iterative reconstruction and automated exposure control  Fasting serum glucose: 109 mg/dl FINDINGS: VISUALIZED BRAIN:   No acute abnormalities are seen  HEAD/NECK:   There is a physiologic distribution of FDG  No FDG avid cervical adenopathy is seen  CT images: Scattered sinus mucosal thickening  CHEST:   Dominant left upper lung lesion image 3/84 has slowly increased in size and FDG intensity since the prior PET/CT, currently measuring approximately 2 7 x 1 5 cm, SUV 4 7  Prior PET/CT measurement 2 9 x 1 3 cm, SUV 2 4  This is most concerning for viable tumor  Enlarging adjacent left upper lung nodule image 3/83 measuring 1 2 x 1 1 cm  Prior PET/CT measurement 3 mm  This does not demonstrate significant FDG uptake, SUV 0 9  However a low-grade neoplasm remains possible  Persistent nodular infiltrate in the right medial lung base  This also demonstrates slow interval growth since prior studies  This currently measures 2 8 x 1 3 cm on image 3/110  SUV measures 4 8  Prior PET/CT measurement approximately 2 8 x 1 cm, though SUV measurement is limited due to adjacent liver activity, 2 3  This is nonspecific and may be inflammatory/infectious, with neoplasm not entirely excluded  Left lung base nodule measuring 8 x 7 mm image 3/109 appears stable and does not demonstrate significant FDG uptake  This would favor a benign etiology  CT images: Coronary atherosclerosis  Stable thickening along the right minor fissure   ABDOMEN: Enlarging pancreatic cystic lesion without significant FDG uptake  This measures 3 3 x 2 7 cm  Prior PET/CT measurement 1 5 x 1 5 cm  Bowel activity is likely physiologic  No FDG avid soft tissue lesions are seen  CT images: Cholecystectomy  Right renal cortical scarring  PELVIS: No FDG avid soft tissue lesions are seen  CT images: Hysterectomy  OSSEOUS STRUCTURES: Left scapular lytic lesion again noted with significantly decreased FDG uptake, SUV 2 4, compatible with treated metastasis  Prior SUV 14 7  No new FDG avid lesions are seen  CT images: Spine degenerative change  Mild lumbar levoscoliosis  Impression: 1  Slowly enlarging dominant left upper lung lesion with increasing FDG uptake, most concerning for viable tumor  2   Enlarging adjacent left upper lung nodule  Although this does not demonstrate significant FDG uptake, this remains concerning for a low-grade neoplasm  Consider tissue sampling if this would alter clinical management  3   Slowly increasing nodular infiltrate in the right medial lung base with increased FDG uptake  This may be inflammatory/infectious, with neoplasm also not excluded  Close follow-up or tissue sampling also suggested  4   Enlarging pancreatic cystic lesion without significant FDG uptake  Dedicated contrast MR evaluation suggested for further evaluation  The study was marked in EPIC for significant notification  Workstation performed: [id number]      I reviewed and independently interpreted the above laboratory and imaging data  Discussion/Summary: 66 yo female with enlarging panc mid-body cyst  Described to pt that panc cysts can range from entirely benign to premalignant or even harbor malignancy  Described that in the context of metastatic lung cancer, we are unlikely to pursue surgical treatment for a pancreatic cyst, but with the data currently available, we cannot determine what type of cyst this is  Currently the imaging available in noncontrasted, limiting interpretation   She understands that I am unlikely to recommend any type of surgery, and we discussed whether it is necessary to pursue further workup for the cyst, but she would like more information before declining further evaluation / treatment  I described that EUS will most likely be required to determine what type of cyst this represents, and also discussed that MRI may give us some insight into a diagnosis  She would like to pursue MRI, and will RTC to the office following this study to discuss additional recommendations

## 2021-03-26 ENCOUNTER — HOSPITAL ENCOUNTER (OUTPATIENT)
Dept: MRI IMAGING | Facility: HOSPITAL | Age: 71
Discharge: HOME | End: 2021-03-26
Attending: STUDENT IN AN ORGANIZED HEALTH CARE EDUCATION/TRAINING PROGRAM
Payer: MEDICARE

## 2021-03-26 DIAGNOSIS — K86.2 CYST OF PANCREAS: ICD-10-CM

## 2021-03-26 PROCEDURE — 74181 MRI ABDOMEN W/O CONTRAST: CPT

## 2021-03-26 PROCEDURE — G1004 CDSM NDSC: HCPCS

## 2021-03-26 PROCEDURE — 76377 3D RENDER W/INTRP POSTPROCES: CPT

## 2021-03-30 ENCOUNTER — TELEPHONE (OUTPATIENT)
Dept: NEPHROLOGY | Facility: CLINIC | Age: 71
End: 2021-03-30

## 2021-03-30 ENCOUNTER — APPOINTMENT (OUTPATIENT)
Dept: RADIATION ONCOLOGY | Facility: HOSPITAL | Age: 71
End: 2021-03-30
Attending: RADIOLOGY
Payer: MEDICARE

## 2021-03-30 PROCEDURE — 77295 3-D RADIOTHERAPY PLAN: CPT | Performed by: RADIOLOGY

## 2021-03-30 PROCEDURE — 77300 RADIATION THERAPY DOSE PLAN: CPT | Performed by: RADIOLOGY

## 2021-03-30 PROCEDURE — 77334 RADIATION TREATMENT AID(S): CPT | Performed by: RADIOLOGY

## 2021-03-30 PROCEDURE — 77293 RESPIRATOR MOTION MGMT SIMUL: CPT | Performed by: RADIOLOGY

## 2021-03-30 PROCEDURE — 77370 RADIATION PHYSICS CONSULT: CPT | Performed by: RADIOLOGY

## 2021-03-30 NOTE — TELEPHONE ENCOUNTER
I called and left detailed message for patient to have blood work and UA done prior to appointment with Dr Ely Briggs on 04/08/2021-informed if going through [facility] lab scripts are in system if going elsewhere asked to call us so we can send accordingly

## 2021-04-01 ENCOUNTER — TELEPHONE (OUTPATIENT)
Dept: OTHER | Facility: HOSPITAL | Age: 71
End: 2021-04-01

## 2021-04-01 ENCOUNTER — APPOINTMENT (OUTPATIENT)
Dept: LAB | Facility: AMBULARY SURGERY CENTER | Age: 71
End: 2021-04-01
Payer: MEDICARE

## 2021-04-01 DIAGNOSIS — N18.32 STAGE 3B CHRONIC KIDNEY DISEASE (HCC): ICD-10-CM

## 2021-04-01 DIAGNOSIS — N18.32 ANEMIA IN STAGE 3B CHRONIC KIDNEY DISEASE (HCC): ICD-10-CM

## 2021-04-01 DIAGNOSIS — R80.1 PERSISTENT PROTEINURIA: ICD-10-CM

## 2021-04-01 DIAGNOSIS — E11.9 TYPE 2 DIABETES MELLITUS WITHOUT COMPLICATION, WITHOUT LONG-TERM CURRENT USE OF INSULIN (HCC): ICD-10-CM

## 2021-04-01 DIAGNOSIS — D63.1 ANEMIA IN STAGE 3B CHRONIC KIDNEY DISEASE (HCC): ICD-10-CM

## 2021-04-01 DIAGNOSIS — I50.30 HYPERTENSIVE HEART DISEASE WITH DIASTOLIC CONGESTIVE HEART FAILURE, UNSPECIFIED HF CHRONICITY (HCC): ICD-10-CM

## 2021-04-01 DIAGNOSIS — I11.0 HYPERTENSIVE HEART DISEASE WITH DIASTOLIC CONGESTIVE HEART FAILURE, UNSPECIFIED HF CHRONICITY (HCC): ICD-10-CM

## 2021-04-01 DIAGNOSIS — E78.2 MIXED HYPERLIPIDEMIA: ICD-10-CM

## 2021-04-01 LAB
25(OH)D3 SERPL-MCNC: 73.1 NG/ML (ref 30–100)
ANION GAP SERPL CALCULATED.3IONS-SCNC: 4 MMOL/L (ref 4–13)
BACTERIA UR QL AUTO: ABNORMAL /HPF
BILIRUB UR QL STRIP: NEGATIVE
BUN SERPL-MCNC: 26 MG/DL (ref 5–25)
CALCIUM SERPL-MCNC: 9.6 MG/DL (ref 8.3–10.1)
CHLORIDE SERPL-SCNC: 112 MMOL/L (ref 100–108)
CHOLEST SERPL-MCNC: 211 MG/DL (ref 50–200)
CLARITY UR: ABNORMAL
CO2 SERPL-SCNC: 26 MMOL/L (ref 21–32)
COLOR UR: YELLOW
CREAT SERPL-MCNC: 1.87 MG/DL (ref 0.6–1.3)
CREAT UR-MCNC: 66 MG/DL
CREAT UR-MCNC: 66 MG/DL
ERYTHROCYTE [DISTWIDTH] IN BLOOD BY AUTOMATED COUNT: 13.7 % (ref 11.6–15.1)
EST. AVERAGE GLUCOSE BLD GHB EST-MCNC: 140 MG/DL
FERRITIN SERPL-MCNC: 53 NG/ML (ref 8–388)
GFR SERPL CREATININE-BSD FRML MDRD: 26 ML/MIN/1.73SQ M
GLUCOSE P FAST SERPL-MCNC: 128 MG/DL (ref 65–99)
GLUCOSE UR STRIP-MCNC: NEGATIVE MG/DL
HBA1C MFR BLD: 6.5 %
HCT VFR BLD AUTO: 34.6 % (ref 34.8–46.1)
HDLC SERPL-MCNC: 45 MG/DL
HGB BLD-MCNC: 11.1 G/DL (ref 11.5–15.4)
HGB UR QL STRIP.AUTO: NEGATIVE
HYALINE CASTS #/AREA URNS LPF: ABNORMAL /LPF
IRON SATN MFR SERPL: 13 %
IRON SERPL-MCNC: 40 UG/DL (ref 50–170)
KETONES UR STRIP-MCNC: NEGATIVE MG/DL
LDLC SERPL CALC-MCNC: 136 MG/DL (ref 0–100)
LEUKOCYTE ESTERASE UR QL STRIP: ABNORMAL
MAGNESIUM SERPL-MCNC: 1.9 MG/DL (ref 1.6–2.6)
MCH RBC QN AUTO: 29.2 PG (ref 26.8–34.3)
MCHC RBC AUTO-ENTMCNC: 32.1 G/DL (ref 31.4–37.4)
MCV RBC AUTO: 91 FL (ref 82–98)
MICROALBUMIN UR-MCNC: 19.7 MG/L (ref 0–20)
MICROALBUMIN/CREAT 24H UR: 30 MG/G CREATININE (ref 0–30)
NITRITE UR QL STRIP: POSITIVE
NON-SQ EPI CELLS URNS QL MICRO: ABNORMAL /HPF
PH UR STRIP.AUTO: 6 [PH]
PHOSPHATE SERPL-MCNC: 3.8 MG/DL (ref 2.3–4.1)
PLATELET # BLD AUTO: 176 THOUSANDS/UL (ref 149–390)
PMV BLD AUTO: 11.4 FL (ref 8.9–12.7)
POTASSIUM SERPL-SCNC: 4.1 MMOL/L (ref 3.5–5.3)
PROT UR STRIP-MCNC: NEGATIVE MG/DL
PROT UR-MCNC: 25 MG/DL
PROT/CREAT UR: 0.38 MG/G{CREAT} (ref 0–0.1)
PTH-INTACT SERPL-MCNC: 25.9 PG/ML (ref 18.4–80.1)
RBC # BLD AUTO: 3.8 MILLION/UL (ref 3.81–5.12)
RBC #/AREA URNS AUTO: ABNORMAL /HPF
SODIUM SERPL-SCNC: 142 MMOL/L (ref 136–145)
SP GR UR STRIP.AUTO: 1.01 (ref 1–1.03)
TIBC SERPL-MCNC: 302 UG/DL (ref 250–450)
TRIGL SERPL-MCNC: 152 MG/DL
TSH SERPL DL<=0.05 MIU/L-ACNC: 1.88 UIU/ML (ref 0.36–3.74)
UROBILINOGEN UR QL STRIP.AUTO: 0.2 E.U./DL
WBC # BLD AUTO: 4.81 THOUSAND/UL (ref 4.31–10.16)
WBC #/AREA URNS AUTO: ABNORMAL /HPF

## 2021-04-01 PROCEDURE — 85027 COMPLETE CBC AUTOMATED: CPT

## 2021-04-01 PROCEDURE — 82043 UR ALBUMIN QUANTITATIVE: CPT

## 2021-04-01 PROCEDURE — 83540 ASSAY OF IRON: CPT

## 2021-04-01 PROCEDURE — 82728 ASSAY OF FERRITIN: CPT

## 2021-04-01 PROCEDURE — 83550 IRON BINDING TEST: CPT

## 2021-04-01 PROCEDURE — 82570 ASSAY OF URINE CREATININE: CPT

## 2021-04-01 PROCEDURE — 36415 COLL VENOUS BLD VENIPUNCTURE: CPT

## 2021-04-01 PROCEDURE — 81001 URINALYSIS AUTO W/SCOPE: CPT

## 2021-04-01 PROCEDURE — 83036 HEMOGLOBIN GLYCOSYLATED A1C: CPT

## 2021-04-01 PROCEDURE — 84443 ASSAY THYROID STIM HORMONE: CPT

## 2021-04-01 PROCEDURE — 83970 ASSAY OF PARATHORMONE: CPT

## 2021-04-01 PROCEDURE — 80061 LIPID PANEL: CPT

## 2021-04-01 PROCEDURE — 80048 BASIC METABOLIC PNL TOTAL CA: CPT

## 2021-04-01 PROCEDURE — 84156 ASSAY OF PROTEIN URINE: CPT

## 2021-04-01 PROCEDURE — 82306 VITAMIN D 25 HYDROXY: CPT

## 2021-04-01 PROCEDURE — 84100 ASSAY OF PHOSPHORUS: CPT

## 2021-04-01 PROCEDURE — 83735 ASSAY OF MAGNESIUM: CPT

## 2021-04-01 NOTE — TELEPHONE ENCOUNTER
I spoke with patient and informed of the following:  Creatinine relatively stable  Will discuss in more detail during office visit in one week      UA shows some signs of infection  -Patient states she does not have any of the following:  fever, chills, lower abdominal pain, urinary symptoms like dysuria, urinary frequency/urgency, foul smell in urine, cloudy urine, etc  Informed that sinceno symptoms, will monitor without any need for antibiotics  Patient verbalized understanding and just wanted to let Dr Lenz Reasons know that she will be starting radiation therapy soon, she is not sure exactly when but wanted to inform you and states she will speak to you during the appointment about this  Thank you

## 2021-04-01 NOTE — TELEPHONE ENCOUNTER
Please let her know creatinine relatively stable  Will discuss in more detail during office visit in one week  UA shows some signs of infection  Does patient have fever, chills, lower abdominal pain, urinary symptoms like dysuria, urinary frequency/urgency, foul smell in urine, cloudy urine, etc ? If has no symptoms, will monitor without any need for antibiotics

## 2021-04-05 ENCOUNTER — APPOINTMENT (OUTPATIENT)
Dept: RADIATION ONCOLOGY | Facility: HOSPITAL | Age: 71
End: 2021-04-05
Attending: RADIOLOGY
Payer: MEDICARE

## 2021-04-05 PROCEDURE — 77386 HB NTSTY MODUL RAD TX DLVR CPLX: CPT | Performed by: RADIOLOGY

## 2021-04-07 ENCOUNTER — APPOINTMENT (OUTPATIENT)
Dept: RADIATION ONCOLOGY | Facility: HOSPITAL | Age: 71
End: 2021-04-07
Attending: RADIOLOGY
Payer: MEDICARE

## 2021-04-07 PROCEDURE — 77386 HB NTSTY MODUL RAD TX DLVR CPLX: CPT | Performed by: RADIOLOGY

## 2021-04-08 ENCOUNTER — OFFICE VISIT (OUTPATIENT)
Dept: NEPHROLOGY | Facility: CLINIC | Age: 71
End: 2021-04-08
Payer: MEDICARE

## 2021-04-08 VITALS
BODY MASS INDEX: 33.67 KG/M2 | WEIGHT: 197.2 LBS | SYSTOLIC BLOOD PRESSURE: 140 MMHG | HEIGHT: 64 IN | DIASTOLIC BLOOD PRESSURE: 68 MMHG

## 2021-04-08 DIAGNOSIS — D63.1 ANEMIA IN STAGE 3B CHRONIC KIDNEY DISEASE (HCC): ICD-10-CM

## 2021-04-08 DIAGNOSIS — E83.52 HYPERCALCEMIA: ICD-10-CM

## 2021-04-08 DIAGNOSIS — N18.32 STAGE 3B CHRONIC KIDNEY DISEASE (HCC): ICD-10-CM

## 2021-04-08 DIAGNOSIS — R80.1 PERSISTENT PROTEINURIA: ICD-10-CM

## 2021-04-08 DIAGNOSIS — N18.30 BENIGN HYPERTENSION WITH CKD (CHRONIC KIDNEY DISEASE) STAGE III (HCC): Primary | ICD-10-CM

## 2021-04-08 DIAGNOSIS — N18.32 ANEMIA IN STAGE 3B CHRONIC KIDNEY DISEASE (HCC): ICD-10-CM

## 2021-04-08 DIAGNOSIS — I12.9 BENIGN HYPERTENSION WITH CKD (CHRONIC KIDNEY DISEASE) STAGE III (HCC): Primary | ICD-10-CM

## 2021-04-08 DIAGNOSIS — D50.9 IRON DEFICIENCY ANEMIA, UNSPECIFIED IRON DEFICIENCY ANEMIA TYPE: ICD-10-CM

## 2021-04-08 PROCEDURE — 99214 OFFICE O/P EST MOD 30 MIN: CPT | Performed by: INTERNAL MEDICINE

## 2021-04-08 NOTE — PROGRESS NOTES
NEPHROLOGY OUTPATIENT PROGRESS NOTE   Francisco Griffith 65 y o  female MRN: [id number]  DATE: 4/8/2021  Reason for visit:   Chief Complaint   Patient presents with    Follow-up    Chronic Kidney Disease     ASSESSMENT and PLAN:  CKD stage 3, baseline creatinine 1 7 to 1 8 since December 2020, 1 4 to 1 6 since early 2020, 1 2 to 1 3 since late 2019, prior 0 9 to 1 1 in early 2019  -last creatinine 1 8 in April 2021 stable at recent baseline  Overall progression of CKD  -recommended to increase free water intake as much as possible  -CKD likely multifactorial in the setting of long-term history of hypertension/diabetes, chemo induced nephrotoxicity remains differential (was on Keytruda in 2016, was on Alimta (Pemetrexed) - can occasionally cause renal failure, now remains off), obesity  -UA in April 2021 shows innumerable WBCs, bacteria, no proteinuria or hematuria  Patient denies any urinary symptoms, suspect asymptomatic bacteriuria  -upc ratio 380 mg in April 2021   Check repeat UA, UPC ratio before next visit  -avoid nephrotoxins or NSAIDs  -MRI in March 2021 shows no hydronephrosis, focal scarring at lateral right mid kidney   -renal ultrasound in March 2020 shows normal size kidneys, chronic right kidney lower pole cortical scar, no stones, normal echogenicity      Hypertension  -blood pressure slightly above goal in the office today   -home BP readings are 130s/70s  Home BP machine compared with our office readings in the past which were identical   Given better controlled home BP readings, will continue current antihypertensive regimen without change   -if she continued to have elevated BP readings going forward in the office settings, consider 24 hour ABPM   -continue losartan 50 mg daily, metoprolol, sotalol for AFib issues   - Could not tolerate lisinopril due to dry cough   -she has allergic reaction to diltiazem and would rather avoid amlodipine in the future as per my prior discussion with patient in the past   She did not start doxazosin as afraid of allergic reaction   -takes Lasix p r n  for leg edema   -salt restricted diet     Anemia in CKD/iron deficiency anemia, continue to closely monitor   Hemoglobin overall stable   Repeat CBC, iron studies before next visit   -iron saturation 13%, ferritin 53  -start ferrous sulfate one tablet daily     Borderline hypercalcemia,  overall stable    -vitamin-D level 73, PTH 25 9  -discontinue calcium supplements    -continue to monitor     Proteinuria, upc ratio 380 mg in 2021   -continue losartan 50 mg daily  Continue same for now  Could be in the setting of hypertension/diabetes        Diabetes, most recent hemoglobin A1c  6 5     Stage IV lung adenocarcinoma with metastasis to bone, now remains off chemo for last one month   Currently remains on radiation   Follows with Hematology  Diagnoses and all orders for this visit:    Benign hypertension with CKD (chronic kidney disease) stage III (HCC)  -     Comprehensive metabolic panel; Future    Stage 3b chronic kidney disease  -     CBC; Future  -     Magnesium; Future  -     Phosphorus; Future  -     PTH, intact; Future  -     UA (URINE) with reflex to Scope; Future  -     Microalbumin / creatinine urine ratio; Future  -     Vitamin D 25 hydroxy; Future  -     Comprehensive metabolic panel; Future    Persistent proteinuria  -     UA (URINE) with reflex to Scope; Future  -     Microalbumin / creatinine urine ratio; Future    Iron deficiency anemia, unspecified iron deficiency anemia type  -     Ferritin; Future  -     Iron Saturation %; Future    Anemia in stage 3b chronic kidney disease    Hypercalcemia        SUBJECTIVE / HPI:  Yadi Browningo is I 67 y  o  year old female with medical issues of hypertension for 15 years, diabetes for 15 years, AFib, stage IV lung adenocarcinoma diagnosed in 2016 with metastasis to bone, status post radiation, currently on chemo, prior history of smoking, CKD stage 3 with baseline creatinine 1 7 to 1 8 since late 2020, 1 4 to 1 6 since early 2020, 1 2 to 1 3 since late 2019, who presents for regular follow-up of CKD management  Overall slowly progressing CKD  Last serum creatinine relatively stable 1 8  Her home BP readings are generally 130s/70s  BP remains slightly above goal in the office today  Now she remains off chemo for last one month and currently undergoing radiation treatment      Denies any recent NSAID use   Denies any urinary complaint   Denies chest pain, shortness of breath, nausea, vomiting      She has not used Lasix for last one month     Denies any history of kidney stones in the past   No obvious history of skin rash or joint pain currently  REVIEW OF SYSTEMS:  More than 10 point review of systems were obtained and discussed in length with the patient  Complete review of systems were negative / unremarkable except mentioned above  PHYSICAL EXAM:  Vitals:    04/08/21 1012 04/08/21 1029   BP: 124/72 140/68   BP Location: Left arm    Patient Position: Sitting    Cuff Size: Standard    Weight: 89 4 kg (197 lb 3 2 oz)    Height: 5' 4" (1 626 m)      Body mass index is 33 85 kg/m²  Physical Exam  Vitals signs reviewed  Constitutional:       Appearance: She is well-developed  HENT:      Head: Normocephalic and atraumatic  Right Ear: External ear normal       Left Ear: External ear normal       Nose: Nose normal    Eyes:      Comments: Mild conjunctival pallor present   Neck:      Musculoskeletal: Neck supple  Cardiovascular:      Comments: S1, S2 present  Pulmonary:      Effort: Pulmonary effort is normal       Breath sounds: Normal breath sounds  No wheezing or rales  Abdominal:      General: Bowel sounds are normal  There is no distension  Palpations: Abdomen is soft  Tenderness: There is no abdominal tenderness  Musculoskeletal:         General: No tenderness  Right lower leg: No edema  Left lower leg: No edema  Skin:     General: Skin is warm  Findings: No rash  Neurological:      Mental Status: She is alert and oriented to person, place, and time  Psychiatric:         Behavior: Behavior normal          PAST MEDICAL HISTORY:  Past Medical History:   Diagnosis Date    Arthritis     Atrial fibrillation ([facility] )     Diabetes mellitus ([place] [address] )     Diabetes mellitus type 2, uncomplicated (HCC)     Last assessed: 8/17/17    Essential hypertension     Frozen shoulder     L shoulder    GERD (gastroesophageal reflux disease)     Hx of cancer of uterus     Last assessed: 8/21/15    Hyperlipidemia     Hyponatremia 11/16/2016    Lung mass     diagnosed 9/2016    Malignant neoplasm without specification of site ([place] [address] )     Skin cancer, basal cell     right eye area    Stage 4 lung cancer ([place] [address] )        PAST SURGICAL HISTORY:  Past Surgical History:   Procedure Laterality Date    APPENDECTOMY      BRONCHOSCOPY N/A 11/17/2016    Procedure: BRONCHOSCOPY FLEXIBLE;  Surgeon: Do Foote MD;  [facility];  Service:    Thomas Dubois CHOLECYSTECTOMY      COLONOSCOPY      GALLBLADDER SURGERY      HYSTERECTOMY  2000    Total abdominal    LARYNGOSCOPY      Flexible Fiberoptic, (Therapeutic), Resolved: 11/17/16    MOHS SURGERY      Micrographic Surgery Face    [place] BRONCHOSCOPY NEEDLE BX TRACHEA MAIN STEM&/BRON N/A 11/17/2016    Procedure: EBUS; FROZEN SECTION ;  Surgeon: Do Foote MD;  [facility];  Service: Thoracic    [place] [address] N/A 5/24/2017    Procedure: BRONCHOSCOPY FLEXIBLE;  Surgeon: Gonzalo Florez MD;  Location: [facility];   Service: Pulmonary    TONSILECTOMY AND ADNOIDECTOMY      TOTAL KNEE ARTHROPLASTY Right 09/23/2014       SOCIAL HISTORY:  Social History     Substance and Sexual Activity   Alcohol Use No     Social History     Substance and Sexual Activity   Drug Use No     Social History     Tobacco Use   Smoking Status Former Smoker    Packs/day: 1 00    Years: 50 00    Pack years: 50 00    Types: Cigarettes    Start date:     Quit date:     Years since quittin 2   Smokeless Tobacco Never Used   Tobacco Comment    Quit in        FAMILY HISTORY:  Family History   Problem Relation Age of Onset    Heart disease Father         cardiac disorder    Hypertension Father     Arthritis Father     Stroke Father         cerebrovascular accident    Skin cancer Father     Diabetes Mother     Heart disease Mother    Philip Lamprey Dementia Mother     Hypertension Mother     Thyroid disease Mother     Cancer Maternal Grandfather         of unknown origin    Cancer Family     Depression Family     Diabetes Family     Hyperlipidemia Family         essential    Heart disease Family     Hypertension Family     Stroke Family         syndrome    Lung cancer Paternal Uncle     Muscular dystrophy Brother        MEDICATIONS:    Current Outpatient Medications:     acetaminophen (TYLENOL) 325 mg tablet, Take 650 mg by mouth every 6 (six) hours as needed for mild pain, Disp: , Rfl:     apixaban (Eliquis) 5 mg, TAKE 1 TABLET TWICE A DAY, Disp: 180 tablet, Rfl: 3    Cholecalciferol (VITAMIN D) 2000 units CAPS, Take by mouth, Disp: , Rfl:     cyanocobalamin (VITAMIN B-12) 100 mcg tablet, Take by mouth daily, Disp: , Rfl:     Folic Acid 0 8 MG CAPS, daily , Disp: , Rfl:     furosemide (LASIX) 40 mg tablet, Take 1 tablet (40 mg total) by mouth as needed (leg swelling, fluid overload), Disp: , Rfl:     linaGLIPtin (Tradjenta) 5 MG TABS, Take 5 mg by mouth daily, Disp: 90 tablet, Rfl: 3    losartan (COZAAR) 50 mg tablet, Take 1 tablet (50 mg total) by mouth daily, Disp: 90 tablet, Rfl: 3    metFORMIN (GLUCOPHAGE) 1000 MG tablet, Take 1 tablet (1,000 mg total) by mouth 2 (two) times a day with meals, Disp: 180 tablet, Rfl: 3    metoclopramide (REGLAN) 10 mg tablet, TAKE 1/2 TABLET (5MG TOTAL)3 TIMES A DAY BEFORE MEALS AS NEEDED FOR NAUSEA, Disp: 60 tablet, Rfl: 0    metoprolol tartrate (LOPRESSOR) 25 mg tablet, Take 1 tablet (25 mg total) by mouth every 12 (twelve) hours, Disp: 180 tablet, Rfl: 3    omeprazole (PriLOSEC) 20 mg delayed release capsule, Take 1 capsule (20 mg total) by mouth daily, Disp: 90 capsule, Rfl: 3    potassium chloride (K-DUR,KLOR-CON) 20 mEq tablet, Take 2 tablets (40 mEq total) by mouth daily as needed (low K+), Disp: , Rfl:     sotalol (BETAPACE) 80 mg tablet, Take 1 tablet (80 mg total) by mouth 2 (two) times a day 1 tab PO BID, Disp: 180 tablet, Rfl: 3    venlafaxine (EFFEXOR) 37 5 mg tablet, TAKE 1 TABLET DAILY, Disp: 90 tablet, Rfl: 3    dexamethasone (DECADRON) 4 mg tablet, TAKE 1 TABLET TWICE A DAY WITH FOOD, ON DAY BEFORE CHEMOTHERAPY, DAY OF CHEMOTHERAPY AND DAY AFTER CHEMOTHERAPY (Patient not taking: Reported on 3/17/2021), Disp: 90 tablet, Rfl: 1    ondansetron (ZOFRAN-ODT) 4 mg disintegrating tablet, Take 1 tablet (4 mg total) by mouth every 6 (six) hours as needed for nausea or vomiting (Patient not taking: Reported on 3/23/2021), Disp: 30 tablet, Rfl: 3    Lab Results:   Results for orders placed or performed in visit on 04/01/21   UA (URINE) with reflex to Scope   Result Value Ref Range    Color, UA Yellow     Clarity, UA Cloudy     Specific [place], UA 1 012 1 003 - 1 030    pH, UA 6 0 4 5, 5 0, 5 5, 6 0, 6 5, 7 0, 7 5, 8 0    Leukocytes, UA Moderate (A) Negative    Nitrite, UA Positive (A) Negative    Protein, UA Negative Negative mg/dl    Glucose, UA Negative Negative mg/dl    Ketones, UA Negative Negative mg/dl    Urobilinogen, UA 0 2 0 2, 1 0 E U /dl E U /dl    Bilirubin, UA Negative Negative    Blood, UA Negative Negative   Protein / creatinine ratio, urine   Result Value Ref Range    Creatinine, Ur 66 0 mg/dL    Protein Urine Random 25 mg/dL    Prot/Creat Ratio, Ur 0 38 (H) 0 00 - 7 56   Basic metabolic panel   Result Value Ref Range    Sodium 142 136 - 145 mmol/L    Potassium 4 1 3 5 - 5 3 mmol/L    Chloride 112 (H) 100 - 108 mmol/L CO2 26 21 - 32 mmol/L    ANION GAP 4 4 - 13 mmol/L    BUN 26 (H) 5 - 25 mg/dL    Creatinine 1 87 (H) 0 60 - 1 30 mg/dL    Glucose, Fasting 128 (H) 65 - 99 mg/dL    Calcium 9 6 8 3 - 10 1 mg/dL    eGFR 26 ml/min/1 73sq m   CBC   Result Value Ref Range    WBC 4 81 4 31 - 10 16 Thousand/uL    RBC 3 80 (L) 3 81 - 5 12 Million/uL    Hemoglobin 11 1 (L) 11 5 - 15 4 g/dL    Hematocrit 34 6 (L) 34 8 - 46 1 %    MCV 91 82 - 98 fL    MCH 29 2 26 8 - 34 3 pg    MCHC 32 1 31 4 - 37 4 g/dL    RDW 13 7 11 6 - 15 1 %    Platelets 955 171 - 285 Thousands/uL    MPV 11 4 8 9 - 12 7 fL   Magnesium   Result Value Ref Range    Magnesium 1 9 1 6 - 2 6 mg/dL   Phosphorus   Result Value Ref Range    Phosphorus 3 8 2 3 - 4 1 mg/dL   PTH, intact   Result Value Ref Range    PTH 25 9 18 4 - 80 1 pg/mL   Ferritin   Result Value Ref Range    Ferritin 53 8 - 388 ng/mL   Iron Saturation %   Result Value Ref Range    Iron Saturation 13 %    TIBC 302 250 - 450 ug/dL    Iron 40 (L) 50 - 170 ug/dL   Vitamin D 25 hydroxy   Result Value Ref Range    Vit D, 25-Hydroxy 73 1 30 0 - 100 0 ng/mL   Hemoglobin A1C   Result Value Ref Range    Hemoglobin A1C 6 5 (H) Normal 3 8-5 6%; PreDiabetic 5 7-6 4%; Diabetic >=6 5%; Glycemic control for adults with diabetes <7 0% %     mg/dl   Lipid Panel with Direct LDL reflex   Result Value Ref Range    Cholesterol 211 (H) 50 - 200 mg/dL    Triglycerides 152 (H) <=150 mg/dL    HDL, Direct 45 >=40 mg/dL    LDL Calculated 136 (H) 0 - 100 mg/dL   Microalbumin / creatinine urine ratio   Result Value Ref Range    Creatinine, Ur 66 0 mg/dL    Microalbum  ,U,Random 19 7 0 0 - 20 0 mg/L    Microalb Creat Ratio 30 0 - 30 mg/g creatinine   TSH, 3rd generation with Free T4 reflex   Result Value Ref Range    TSH 3RD GENERATON 1 880 0 358 - 3 740 uIU/mL   Urine Microscopic   Result Value Ref Range    RBC, UA None Seen None Seen, 2-4 /hpf    WBC, UA Innumerable (A) None Seen, 2-4 /hpf    Epithelial Cells None Seen None Seen, Occasional /hpf    Bacteria, UA Innumerable (A) None Seen, Occasional /hpf    Hyaline Casts, UA None Seen None Seen /lpf     *Note: Due to a large number of results and/or encounters for the requested time period, some results have not been displayed  A complete set of results can be found in Results Review

## 2021-04-09 ENCOUNTER — APPOINTMENT (OUTPATIENT)
Dept: RADIATION ONCOLOGY | Facility: HOSPITAL | Age: 71
End: 2021-04-09
Attending: RADIOLOGY
Payer: MEDICARE

## 2021-04-09 PROCEDURE — 77386 HB NTSTY MODUL RAD TX DLVR CPLX: CPT | Performed by: RADIOLOGY

## 2021-04-12 ENCOUNTER — APPOINTMENT (OUTPATIENT)
Dept: RADIATION ONCOLOGY | Facility: HOSPITAL | Age: 71
End: 2021-04-12
Attending: RADIOLOGY
Payer: MEDICARE

## 2021-04-12 DIAGNOSIS — C34.91 MALIGNANT NEOPLASM OF UNSPECIFIED PART OF RIGHT BRONCHUS OR LUNG (HCC): Primary | ICD-10-CM

## 2021-04-12 PROCEDURE — 77386 HB NTSTY MODUL RAD TX DLVR CPLX: CPT | Performed by: RADIOLOGY

## 2021-04-13 ENCOUNTER — APPOINTMENT (OUTPATIENT)
Dept: LAB | Facility: AMBULARY SURGERY CENTER | Age: 71
End: 2021-04-13
Payer: MEDICARE

## 2021-04-13 ENCOUNTER — OFFICE VISIT (OUTPATIENT)
Dept: SURGICAL ONCOLOGY | Facility: CLINIC | Age: 71
End: 2021-04-13
Payer: MEDICARE

## 2021-04-13 VITALS
OXYGEN SATURATION: 98 % | WEIGHT: 197 LBS | DIASTOLIC BLOOD PRESSURE: 86 MMHG | RESPIRATION RATE: 18 BRPM | TEMPERATURE: 98.6 F | HEIGHT: 64 IN | HEART RATE: 66 BPM | SYSTOLIC BLOOD PRESSURE: 142 MMHG | BODY MASS INDEX: 33.63 KG/M2

## 2021-04-13 DIAGNOSIS — C34.91 MALIGNANT NEOPLASM OF UNSPECIFIED PART OF RIGHT BRONCHUS OR LUNG (HCC): ICD-10-CM

## 2021-04-13 DIAGNOSIS — C41.9 MALIGNANT NEOPLASM OF BONE WITH METASTASES (HCC): ICD-10-CM

## 2021-04-13 DIAGNOSIS — K86.2 CYST OF PANCREAS: Primary | ICD-10-CM

## 2021-04-13 LAB
ALBUMIN SERPL BCP-MCNC: 3 G/DL (ref 3.5–5)
ALP SERPL-CCNC: 89 U/L (ref 46–116)
ALT SERPL W P-5'-P-CCNC: 18 U/L (ref 12–78)
ANION GAP SERPL CALCULATED.3IONS-SCNC: 5 MMOL/L (ref 4–13)
AST SERPL W P-5'-P-CCNC: 24 U/L (ref 5–45)
BASOPHILS # BLD AUTO: 0.05 THOUSANDS/ΜL (ref 0–0.1)
BASOPHILS NFR BLD AUTO: 1 % (ref 0–1)
BILIRUB SERPL-MCNC: 0.39 MG/DL (ref 0.2–1)
BUN SERPL-MCNC: 30 MG/DL (ref 5–25)
CALCIUM ALBUM COR SERPL-MCNC: 10.5 MG/DL (ref 8.3–10.1)
CALCIUM SERPL-MCNC: 9.7 MG/DL (ref 8.3–10.1)
CHLORIDE SERPL-SCNC: 112 MMOL/L (ref 100–108)
CO2 SERPL-SCNC: 24 MMOL/L (ref 21–32)
CREAT SERPL-MCNC: 1.87 MG/DL (ref 0.6–1.3)
EOSINOPHIL # BLD AUTO: 0.37 THOUSAND/ΜL (ref 0–0.61)
EOSINOPHIL NFR BLD AUTO: 7 % (ref 0–6)
ERYTHROCYTE [DISTWIDTH] IN BLOOD BY AUTOMATED COUNT: 13.5 % (ref 11.6–15.1)
GFR SERPL CREATININE-BSD FRML MDRD: 26 ML/MIN/1.73SQ M
GLUCOSE P FAST SERPL-MCNC: 142 MG/DL (ref 65–99)
HCT VFR BLD AUTO: 36.1 % (ref 34.8–46.1)
HGB BLD-MCNC: 11.4 G/DL (ref 11.5–15.4)
IMM GRANULOCYTES # BLD AUTO: 0.02 THOUSAND/UL (ref 0–0.2)
IMM GRANULOCYTES NFR BLD AUTO: 0 % (ref 0–2)
LYMPHOCYTES # BLD AUTO: 0.68 THOUSANDS/ΜL (ref 0.6–4.47)
LYMPHOCYTES NFR BLD AUTO: 13 % (ref 14–44)
MCH RBC QN AUTO: 28.9 PG (ref 26.8–34.3)
MCHC RBC AUTO-ENTMCNC: 31.6 G/DL (ref 31.4–37.4)
MCV RBC AUTO: 91 FL (ref 82–98)
MONOCYTES # BLD AUTO: 0.44 THOUSAND/ΜL (ref 0.17–1.22)
MONOCYTES NFR BLD AUTO: 8 % (ref 4–12)
NEUTROPHILS # BLD AUTO: 3.9 THOUSANDS/ΜL (ref 1.85–7.62)
NEUTS SEG NFR BLD AUTO: 71 % (ref 43–75)
NRBC BLD AUTO-RTO: 0 /100 WBCS
PLATELET # BLD AUTO: 194 THOUSANDS/UL (ref 149–390)
PMV BLD AUTO: 11.5 FL (ref 8.9–12.7)
POTASSIUM SERPL-SCNC: 4.6 MMOL/L (ref 3.5–5.3)
PROT SERPL-MCNC: 6.5 G/DL (ref 6.4–8.2)
RBC # BLD AUTO: 3.95 MILLION/UL (ref 3.81–5.12)
SODIUM SERPL-SCNC: 141 MMOL/L (ref 136–145)
WBC # BLD AUTO: 5.46 THOUSAND/UL (ref 4.31–10.16)

## 2021-04-13 PROCEDURE — 99214 OFFICE O/P EST MOD 30 MIN: CPT | Performed by: STUDENT IN AN ORGANIZED HEALTH CARE EDUCATION/TRAINING PROGRAM

## 2021-04-13 PROCEDURE — 80053 COMPREHEN METABOLIC PANEL: CPT

## 2021-04-13 PROCEDURE — 85025 COMPLETE CBC W/AUTO DIFF WBC: CPT

## 2021-04-13 PROCEDURE — 36415 COLL VENOUS BLD VENIPUNCTURE: CPT

## 2021-04-13 NOTE — PROGRESS NOTES
Surgical Oncology Consultation    [address]  [facility]  [address]  [facility] [address]    Patient:  rAabella Angelo  1950  [id number]    Primary Care provider:  Khadijah Cerda, [address] [facility]  [facility],[address] 100  [address] [address]    Referring provider:  No referring provider defined for this encounter  Diagnoses and all orders for this visit:    Cyst of pancreas  -     MRI abdomen w wo contrast and mrcp; Future        Chief Complaint   Patient presents with    Follow-up       No follow-ups on file  Oncology History   Adenocarcinoma of lung, stage 4 ([facility] )   10/18/2016 Initial Diagnosis    Adenocarcinoma of lung, stage 4 ([facility] )     10/18/2016 Biopsy    Final Diagnosis  A  Bone, T10, biopsy:     - Non-small cell carcinoma with features suggesting adenocarcinoma; see note           11/7/2016 - 12/1/2016 Radiation    Course 1: T9 - T11 SPINE  13 fractions   Total Dose = 3,250 cGy     11/17/2016 Biopsy    Final Diagnosis  Lymph Node, Level 7: Conclusive evidence of Malignancy  Poorly differentiated non-small cell carcinoma      Lung, right main stem bronchus, biopsy:              - Poorly differentiated adenocarcinoma       12/15/2016 - 6/1/2017 Chemotherapy    Pembrolizumab 200 mg IV flat dose every 3 weeks      9/13/2017 Biopsy    Bone, left scapula, biopsy:  -  Positive for malignancy, consistent with metastatic adenocarcinoma          10/3/2017 - 10/16/2017 Radiation    palliative course of radiation therapy to the left scapular lesion to 3000 cGy( metastatic from adenocarcinoma of the lung)          4/10/2018 - 10/9/2018 Chemotherapy    nivolumab with prednisone 10 mg p o     11/6/2018 -  Chemotherapy    11/6/18   Alimta 500 milligram/meter squared, carboplatin AUC 5 every 3 weeks,     11/6/2018 - 3/1/2021 Chemotherapy    cyanocobalamin injection 1,000 mcg, 1,000 mcg, Intramuscular, Once, 6 of 10 cycles  Administration: 1,000 mcg (1/17/2020), 1,000 mcg (6/10/2020), 1,000 mcg (10/14/2020), 1,000 mcg (1/5/2021)  fosaprepitant (EMEND) 150 mg in sodium chloride 0 9 % 250 mL IVPB, 150 mg, Intravenous, Once, 1 of 6 cycles  Administration: 150 mg (2/2/2021)  PEMEtrexed (ALIMTA) 1,020 mg in sodium chloride 0 9 % 100 mL chemo infusion, 500 mg/m2 = 1,020 mg, Intravenous, Once, 32 of 37 cycles  Dose modification: 400 mg/m2 (original dose 500 mg/m2, Cycle 23, Reason: Other (See Comments), Comment: decreasing crcl), 300 mg/m2 (original dose 500 mg/m2, Cycle 26, Reason: Treatment Parameters Not Met), 300 mg/m2 (original dose 500 mg/m2, Cycle 30, Reason: Max Dose Reached)  Administration: 1,020 mg (5/24/2019), 1,000 mg (6/14/2019), 1,000 mg (7/5/2019), 1,000 mg (8/2/2019), 1,000 mg (8/23/2019), 1,000 mg (9/13/2019), 1,000 mg (10/4/2019), 1,000 mg (10/25/2019), 1,000 mg (11/15/2019), 1,000 mg (12/6/2019), 1,000 mg (12/27/2019), 1,000 mg (1/17/2020), 1,000 mg (2/7/2020), 800 mg (5/22/2020), 800 mg (6/10/2020), 800 mg (7/1/2020), 600 mg (7/29/2020), 800 mg (8/19/2020), 600 mg (11/11/2020), 600 mg (1/5/2021), 600 mg (2/2/2021), 600 mg (10/14/2020)     11/12/2018 - 11/26/2018 Radiation    Course: C3: Left Scapula retreat  10 fractions  2,000 cGy         Malignant neoplasm of unspecified part of right bronchus or lung ([facility] ) (Resolved)   5/24/2017 Initial Diagnosis    Malignant neoplasm of unspecified part of right bronchus or lung (HCC) (Resolved)     Malignant neoplasm of bone with metastases ([address] )   5/24/2017 Initial Diagnosis    Malignant neoplasm of bone with metastases [facility])         History of Present Illness 3/2021  :  Ms David Gallego is a 66 yo female diagnosed with metastatic lung cancer several years ago; has been treated with XRT and systemic therapy, with overall stable disease but recent progression of a lung nodule with planned SBRT  Was most recently on nivo but therapy held 2/2 renal insufficiency   Most recent PET demonstrated non avid 3 3 cm mid body panc cyst, which has increased in size since PET 2018 when it was 1 5 cm  Reviewing CT from July, the cyst has not changed in size  Pt is asymptomatic  Feels well with no abd pain, no weight loss recently, no new onset faigue, no hx jaundice  No hx pancreatitis; hx cholecystectomy many years ago with what she describes as continued intermitent RUQ pain  No DM, no symptoms insufficiency, no RFs for pancreatitis such as alcohol use  64 yo female with enlarging panc mid-body cyst  Described to pt that panc cysts can range from entirely benign to premalignant or even harbor malignancy  Described that in the context of metastatic lung cancer, we are unlikely to pursue surgical treatment for a pancreatic cyst, but with the data currently available, we cannot determine what type of cyst this is  Currently the imaging available in noncontrasted, limiting interpretation  She understands that I am unlikely to recommend any type of surgery, and we discussed whether it is necessary to pursue further workup for the cyst, but she would like more information before declining further evaluation / treatment  I described that EUS will most likely be required to determine what type of cyst this represents, and also discussed that MRI may give us some insight into a diagnosis  She would like to pursue MRI, and will RTC to the office following this study to discuss additional recommendations  Interval History 4/2021:    MRI has been obtained, revealing:    Cystic lesion arising from the pancreatic body with thin internal septations  Overall, lesion measures 2 8 cm x 2 8 cm x 3 1 cm  There is a dominant main cyst with 4-5 peripheral microcysts  by thin septations  No thickened solid   components or nodularity  The lesion connects to the main pancreatic duct  The patient remains asymptomatic  She denies abdominal pain, nausea vomiting, jaundice, changes in her stool habits, fatigue, unexpected weight loss   She has since initiated radiation treatment for her lung metastasis, which she is tolerating well  Review of Systems  Complete ROS Surg Onc:   Constitutional: The patient denies new or recent history of general fatigue, no recent weight loss, no change in appetite  Eyes: No complaints of visual problems, no scleral icterus  ENT: No complaints of ear pain, no hoarseness, no difficulty swallowing,  no tinnitus and no new masses in head, oral cavity, or neck  Cardiovascular: No complaints of chest pain, no palpitations, no ankle edema  Respiratory: No complaints of shortness of breath, no cough  Gastrointestinal: No complaints of jaundice, no bloody stools, no pale stools  Genitourinary: No complaints of dysuria, no hematuria, no nocturia, no frequent urination, no urethral discharge  Musculoskeletal: No complaints of weakness, paralysis, joint stiffness or arthralgias  Integumentary: No complaints of rash, no new lesions  Neurological: No complaints of convulsions, no seizures, no dizziness  Hematologic/Lymphatic: No complaints of easy bruising  Endocrine:  No hot or cold intolerance  No polydipsia, polyphagia, or polyuria  Allergy/immunology:  No environmental allergies  No food allergies  Not immunocompromised  Skin:  No pallor or rash  No wound        Patient Active Problem List   Diagnosis    Paroxysmal atrial fibrillation (HCC)    BMI 33 0-33 9,adult    Adenocarcinoma of lung, stage 4 (HCC)    Chronic diastolic heart failure ([address] )    Cancer related pain    Atrial fibrillation ([address] )    Benign hypertension with CKD (chronic kidney disease) stage III ([address] )    Diabetes mellitus type 2, uncomplicated ([address] )    Malignant neoplasm of bone with metastases (HCC)    Acid reflux    Hyperlipidemia    Insomnia    Major depressive disorder with single episode, in full remission ([address] )    Vitamin D deficiency    Adhesive capsulitis of shoulder    Osteoarthritis of knee    Anxiety    Lung nodule, multiple    Hypertensive heart disease with congestive heart failure (HCC)    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)    Other fatigue    CINV (chemotherapy-induced nausea and vomiting)    Cyst of pancreas    Caregiver stress    Localized swelling of left foot    Pneumonitis    Anemia in stage 3 chronic kidney disease    Palliative care patient    Stage 3b chronic kidney disease    Persistent proteinuria    Hypercalcemia    Medicare annual wellness visit, subsequent    Iron deficiency anemia     Past Medical History:   Diagnosis Date    Arthritis     Atrial fibrillation ([facility] )     Diabetes mellitus ([place] [address] )     Diabetes mellitus type 2, uncomplicated ([place] [address] )     Last assessed: 8/17/17    Essential hypertension     Frozen shoulder     L shoulder    GERD (gastroesophageal reflux disease)     Hx of cancer of uterus     Last assessed: 8/21/15    Hyperlipidemia     Hyponatremia 11/16/2016    Lung mass     diagnosed 9/2016    Malignant neoplasm without specification of site ([place] [address] )     Skin cancer, basal cell     right eye area    Stage 4 lung cancer ([place] [address] )      Past Surgical History:   Procedure Laterality Date    APPENDECTOMY      BRONCHOSCOPY N/A 11/17/2016    Procedure: BRONCHOSCOPY FLEXIBLE;  Surgeon: Bharat Mena MD;  [facility]: [facility];  Service:    Evelia Lozoya CHOLECYSTECTOMY      COLONOSCOPY      GALLBLADDER SURGERY      HYSTERECTOMY  2000    Total abdominal    LARYNGOSCOPY      Flexible Fiberoptic, (Therapeutic), Resolved: 11/17/16    MOHS SURGERY      Micrographic Surgery Face    [place] BRONCHOSCOPY NEEDLE BX TRACHEA MAIN STEM&/BRON N/A 11/17/2016    Procedure: EBUS; FROZEN SECTION ;  Surgeon: Bharat Mena MD;  [facility];  Service: Thoracic    [address] N/A 5/24/2017    Procedure: BRONCHOSCOPY FLEXIBLE;  Surgeon: Amanda Gutierrez MD;  Location: [facility];   Service: Pulmonary    TONSILECTOMY AND ADNOIDECTOMY      TOTAL KNEE ARTHROPLASTY Right 2014     Family History   Problem Relation Age of Onset    Heart disease Father         cardiac disorder    Hypertension Father     Arthritis Father     Stroke Father         cerebrovascular accident    Skin cancer Father     Diabetes Mother     Heart disease Mother    Divina Hitchcock Dementia Mother     Hypertension Mother     Thyroid disease Mother     Cancer Maternal Grandfather         of unknown origin    Cancer Family     Depression Family     Diabetes Family     Hyperlipidemia Family         essential    Heart disease Family     Hypertension Family     Stroke Family         syndrome    Lung cancer Paternal Uncle     Muscular dystrophy Brother      Social History     Socioeconomic History    Marital status:       Spouse name: Not on file    Number of children: Not on file    Years of education: Not on file    Highest education level: Not on file   Occupational History    Occupation: retired   Social Needs    Financial resource strain: Not on file    Food insecurity     Worry: Not on file     Inability: Not on file   [employer] needs     Medical: Not on file     Non-medical: Not on file   Tobacco Use    Smoking status: Former Smoker     Packs/day: 1 00     Years: 50 00     Pack years: 50 00     Types: Cigarettes     Start date:      Quit date:      Years since quittin 2    Smokeless tobacco: Never Used    Tobacco comment: Quit in    Substance and Sexual Activity    Alcohol use: No    Drug use: No    Sexual activity: Not Currently   Lifestyle    Physical activity     Days per week: Not on file     Minutes per session: Not on file    Stress: Not on file   Relationships    Social connections     Talks on phone: Not on file     Gets together: Not on file     Attends Confucianist service: Not on file     Active member of club or organization: Not on file     Attends meetings of clubs or organizations: Not on file     Relationship status: Not on file    Intimate partner violence     Fear of current or ex partner: Not on file     Emotionally abused: Not on file     Physically abused: Not on file     Forced sexual activity: Not on file   Other Topics Concern    Not on file   Social History Narrative    Lives with family  Cares for her brother who is disabled      Daily caffeinated coffee consumption       Current Outpatient Medications:     acetaminophen (TYLENOL) 325 mg tablet, Take 650 mg by mouth every 6 (six) hours as needed for mild pain, Disp: , Rfl:     apixaban (Eliquis) 5 mg, TAKE 1 TABLET TWICE A DAY, Disp: 180 tablet, Rfl: 3    Cholecalciferol (VITAMIN D) 2000 units CAPS, Take by mouth, Disp: , Rfl:     cyanocobalamin (VITAMIN B-12) 100 mcg tablet, Take by mouth daily, Disp: , Rfl:     Folic Acid 0 8 MG CAPS, daily , Disp: , Rfl:     furosemide (LASIX) 40 mg tablet, Take 1 tablet (40 mg total) by mouth as needed (leg swelling, fluid overload), Disp: , Rfl:     Iron, Ferrous Sulfate, 325 (65 Fe) MG TABS, Take 1 tablet by mouth daily, Disp: , Rfl:     linaGLIPtin (Tradjenta) 5 MG TABS, Take 5 mg by mouth daily, Disp: 90 tablet, Rfl: 3    losartan (COZAAR) 50 mg tablet, Take 1 tablet (50 mg total) by mouth daily, Disp: 90 tablet, Rfl: 3    metFORMIN (GLUCOPHAGE) 1000 MG tablet, Take 1 tablet (1,000 mg total) by mouth 2 (two) times a day with meals, Disp: 180 tablet, Rfl: 3    metoclopramide (REGLAN) 10 mg tablet, TAKE 1/2 TABLET (5MG TOTAL)3 TIMES A DAY BEFORE MEALS AS NEEDED FOR NAUSEA, Disp: 60 tablet, Rfl: 0    metoprolol tartrate (LOPRESSOR) 25 mg tablet, Take 1 tablet (25 mg total) by mouth every 12 (twelve) hours, Disp: 180 tablet, Rfl: 3    omeprazole (PriLOSEC) 20 mg delayed release capsule, Take 1 capsule (20 mg total) by mouth daily, Disp: 90 capsule, Rfl: 3    potassium chloride (K-DUR,KLOR-CON) 20 mEq tablet, Take 2 tablets (40 mEq total) by mouth daily as needed (low K+), Disp: , Rfl:     sotalol (BETAPACE) 80 mg tablet, Take 1 tablet (80 mg total) by mouth 2 (two) times a day 1 tab PO BID, Disp: 180 tablet, Rfl: 3    venlafaxine (EFFEXOR) 37 5 mg tablet, TAKE 1 TABLET DAILY, Disp: 90 tablet, Rfl: 3    dexamethasone (DECADRON) 4 mg tablet, TAKE 1 TABLET TWICE A DAY WITH FOOD, ON DAY BEFORE CHEMOTHERAPY, DAY OF CHEMOTHERAPY AND DAY AFTER CHEMOTHERAPY (Patient not taking: Reported on 3/17/2021), Disp: 90 tablet, Rfl: 1    ondansetron (ZOFRAN-ODT) 4 mg disintegrating tablet, Take 1 tablet (4 mg total) by mouth every 6 (six) hours as needed for nausea or vomiting (Patient not taking: Reported on 3/23/2021), Disp: 30 tablet, Rfl: 3  Allergies   Allergen Reactions    Amoxicillin Rash and Hives    Cardizem [Diltiazem] Rash     Rash      Statins Myalgia     Severe muscle aching  Terrible pains       Vitals:    04/13/21 0853   BP: 142/86   Pulse: 66   Resp: 18   Temp: 98 6 °F (37 °C)   SpO2: 98%       Physical Exam   General: Appears well, appears stated age  Skin: Warm, anicteric  HEENT: Normocephalic, atraumatic; sclera aniceteric, mucous membranes moist; cervical nodes without adenopathy  Cardiopulmonary: RRR, Easy WOB, no BLE edema  Abd: Flat and soft, nontender, no masses appreciated, no hepatosplenomegaly  Well healed lower midline and Pfan incision 2/2 previous hyst for endometrial cancer  MSK: Symmetric, no cyanosis, no overt weakness  Lymphatic: No cervical, axillary or inguinal lymphadenopathy  Neuro: Affect appropriate, no gross motor abnormalities      Pathology:  Reviewed lung pathology    Labs: Reviewed    Imaging  Ct Chest Wo Contrast    Result Date: 2/27/2021  Narrative: CT CHEST WITHOUT IV CONTRAST INDICATION:   C34 92: Malignant neoplasm of unspecified part of left bronchus or lung  Known history of stage IV adenocarcinoma of the lung with left scapular involvement  Status post radiation therapy to the left scapula  Currently maintained on immuno-oncologic therapy  Surveillance  COMPARISON:  CT, dated November 17, 2020   TECHNIQUE: CT examination of the chest was performed without intravenous contrast   Axial, sagittal, and coronal 2D reformatted images were created from the source data and submitted for interpretation  Radiation dose length product (DLP) for this visit:  478 mGy-cm   This examination, like all CT scans performed in the [facility], was performed utilizing techniques to minimize radiation dose exposure, including the use of iterative reconstruction and automated exposure control  FINDINGS: LUNGS:  Redemonstrated are multiple pulmonary nodules  The dominant focus in the left upper lobe measures approximately 22 x 15 mm in greatest transverse dimensions (series 3, image 41), previously 21 x 15 mm  A more peripheral focus also in the left upper lobe measures 13 x 12 mm (series 3, image 43), previously 13 x 13 mm  Nodularity in the right costophrenic sulcus measures 16 x 12 mm in transverse dimensions (series 3, image 98), previously 15 x 11 mm  A left lower lobe pleural nodule measures 10 x 8 mm in greatest transverse dimensions (series 3, image 90), previously 9 x 8 mm  A 4 mm right lower lobe pulmonary nodule is stable (series 3, image 79)  There are no new pulmonary nodules  Right middle lobar atelectasis is presumed secondary to radiation fibrosis, stable  There are mild new bibasilar areas of mild groundglass opacity, nonspecific  A small amount of debris is present in the upper trachea  The endobronchial tree is under otherwise normal  PLEURA:  Unremarkable  HEART/GREAT VESSELS:  Atherosclerotic changes are noted in thoracic aorta and coronary arteries  MEDIASTINUM AND ANABEL:  Unremarkable  CHEST WALL AND LOWER NECK:   Unremarkable  VISUALIZED STRUCTURES IN THE UPPER ABDOMEN:  Redemonstrated is the incompletely characterized cystic mass in the body of the pancreas, measuring 30 x 30 mm in greatest transverse dimensions (series 2, image 61), previously 30 x 29 mm   OSSEOUS STRUCTURES:  Spinal degenerative changes are noted  No acute fracture or destructive osseous lesion  Sclerosis of the left scapular body corresponding to known metastatic disease is stable  Impression: 1  Stable pulmonary nodules  2   Interval development of mild bibasilar ground glass opacities  Though nonspecific, aspiration could be a possible source  Continued follow-up on subsequent oncologic surveillance is recommended  3   Stable size of the incompletely characterized cystic pancreatic mass  Once again, MRI utilizing a pancreatic mass protocol is recommended for complete characterization  The study was marked in EPIC for significant notification  Workstation performed: [id number]     Nm Pet Ct Skull Base To Mid Thigh    Result Date: 3/12/2021  Narrative: PET/CT SCAN INDICATION:  C34 12: Malignant neoplasm of upper lobe, left bronchus or lung C34 90: Malignant neoplasm of unspecified part of unspecified bronchus or lung   , restaging for treatment management, history of radiation to left scapula for metastasis, history of endometrial cancer, status post RICHARD/BSO 2000, Covid vaccination 1 in left arm 2/20/2021 MODIFIER: PS COMPARISON: CT chest 2/22/2021 and priors, including PET CT 10/20/2018 CELL TYPE:  Adenocarcinoma, T10 bone biopsy 10/18/2016 TECHNIQUE:   10 5 mCi F-18-FDG administered IV  Multiplanar attenuation corrected and non attenuation corrected PET images were acquired 60 minutes post injection  Contiguous, low dose, axial CT sections were obtained from the skull base through the femurs   Intravenous contrast material was not utilized  This examination, like all CT scans performed in the [facility], was performed utilizing techniques to minimize radiation dose exposure, including the use of iterative reconstruction and automated exposure control  Fasting serum glucose: 109 mg/dl FINDINGS: VISUALIZED BRAIN:   No acute abnormalities are seen   HEAD/NECK:   There is a physiologic distribution of FDG  No FDG avid cervical adenopathy is seen  CT images: Scattered sinus mucosal thickening  CHEST:   Dominant left upper lung lesion image 3/84 has slowly increased in size and FDG intensity since the prior PET/CT, currently measuring approximately 2 7 x 1 5 cm, SUV 4 7  Prior PET/CT measurement 2 9 x 1 3 cm, SUV 2 4  This is most concerning for viable tumor  Enlarging adjacent left upper lung nodule image 3/83 measuring 1 2 x 1 1 cm  Prior PET/CT measurement 3 mm  This does not demonstrate significant FDG uptake, SUV 0 9  However a low-grade neoplasm remains possible  Persistent nodular infiltrate in the right medial lung base  This also demonstrates slow interval growth since prior studies  This currently measures 2 8 x 1 3 cm on image 3/110  SUV measures 4 8  Prior PET/CT measurement approximately 2 8 x 1 cm, though SUV measurement is limited due to adjacent liver activity, 2 3  This is nonspecific and may be inflammatory/infectious, with neoplasm not entirely excluded  Left lung base nodule measuring 8 x 7 mm image 3/109 appears stable and does not demonstrate significant FDG uptake  This would favor a benign etiology  CT images: Coronary atherosclerosis  Stable thickening along the right minor fissure  ABDOMEN: Enlarging pancreatic cystic lesion without significant FDG uptake  This measures 3 3 x 2 7 cm  Prior PET/CT measurement 1 5 x 1 5 cm  Bowel activity is likely physiologic  No FDG avid soft tissue lesions are seen  CT images: Cholecystectomy  Right renal cortical scarring  PELVIS: No FDG avid soft tissue lesions are seen  CT images: Hysterectomy  OSSEOUS STRUCTURES: Left scapular lytic lesion again noted with significantly decreased FDG uptake, SUV 2 4, compatible with treated metastasis  Prior SUV 14 7  No new FDG avid lesions are seen  CT images: Spine degenerative change  Mild lumbar levoscoliosis  Impression: 1    Slowly enlarging dominant left upper lung lesion with increasing FDG uptake, most concerning for viable tumor  2   Enlarging adjacent left upper lung nodule  Although this does not demonstrate significant FDG uptake, this remains concerning for a low-grade neoplasm  Consider tissue sampling if this would alter clinical management  3   Slowly increasing nodular infiltrate in the right medial lung base with increased FDG uptake  This may be inflammatory/infectious, with neoplasm also not excluded  Close follow-up or tissue sampling also suggested  4   Enlarging pancreatic cystic lesion without significant FDG uptake  Dedicated contrast MR evaluation suggested for further evaluation  The study was marked in EPIC for significant notification  Workstation performed: [id number]      MRI 3/2021    IMPRESSION:     Multiple cystic lesions in the pancreas, 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  The largest 3 1 cm cystic lesion in the pancreatic body contains septations and has increased in size since 2018  Recommend gastroenterology consultation for   consideration of endoscopic ultrasound to evaluate this largest lesion  I reviewed and independently interpreted the above laboratory and imaging data  Discussion/Summary: 64 yo female with enlarging panc mid-body cyst    She also has several other cystic lesions of the pancreas, suggestive of branch type IPMNs  On MRI eval, the largest is 3 1 cm, and is overall stable in size over the past 9 months  There are septations, however no major concerning features on MRI  We discussed pancreatic cysts, and that this likely represents a mucinous cyst of the pancreas  We  We again discussed that some pancreatic cysts have the potential to become cancers, and that this cyst technically meets criteria for further investigation with EUS  On EUS, the gastroenterologist evaluate for solid portions or other concerning features of the cyst that could be biopsied, and obtain a cyst fluid sample     Based on the MRI, I do not see any solid components of the cyst at this time, and again, it has grown minimally  We discussed that if this did reveal itself to be a mucinous cyst, then surgery is the treatment  She does have metastatic lung cancer, and therefore the risks of pancreatic surgery need to be weighed against the fact that she has a metastatic lung cancer  She also expressed concerns about the fact that she is the primary caregiver for her brother, and does not think that she can be in the hospital or have any complications from any potential surgery  At this juncture, I think it is safe to repeat imaging in 1 year's time  If the cyst is further grown or developed other concerning features, and the patient's metastatic lung cancer remains well controlled, we can revisit further evaluation and/or surgery  The patient agrees with this assessment and plan

## 2021-04-14 ENCOUNTER — APPOINTMENT (OUTPATIENT)
Dept: RADIATION ONCOLOGY | Facility: HOSPITAL | Age: 71
End: 2021-04-14
Attending: RADIOLOGY
Payer: MEDICARE

## 2021-04-14 PROCEDURE — 77336 RADIATION PHYSICS CONSULT: CPT | Performed by: RADIOLOGY

## 2021-04-14 PROCEDURE — 77386 HB NTSTY MODUL RAD TX DLVR CPLX: CPT | Performed by: RADIOLOGY

## 2021-04-15 ENCOUNTER — TELEPHONE (OUTPATIENT)
Dept: PALLIATIVE MEDICINE | Facility: CLINIC | Age: 71
End: 2021-04-15

## 2021-04-15 ENCOUNTER — OFFICE VISIT (OUTPATIENT)
Dept: PALLIATIVE MEDICINE | Facility: CLINIC | Age: 71
End: 2021-04-15
Payer: MEDICARE

## 2021-04-15 VITALS
DIASTOLIC BLOOD PRESSURE: 72 MMHG | HEART RATE: 69 BPM | SYSTOLIC BLOOD PRESSURE: 138 MMHG | OXYGEN SATURATION: 98 % | HEIGHT: 64 IN | TEMPERATURE: 96.1 F | WEIGHT: 197 LBS | RESPIRATION RATE: 16 BRPM | BODY MASS INDEX: 33.63 KG/M2

## 2021-04-15 DIAGNOSIS — K21.9 GASTROESOPHAGEAL REFLUX DISEASE WITHOUT ESOPHAGITIS: ICD-10-CM

## 2021-04-15 DIAGNOSIS — N18.32 STAGE 3B CHRONIC KIDNEY DISEASE (HCC): ICD-10-CM

## 2021-04-15 DIAGNOSIS — T45.1X5A CINV (CHEMOTHERAPY-INDUCED NAUSEA AND VOMITING): Chronic | ICD-10-CM

## 2021-04-15 DIAGNOSIS — C79.52 SECONDARY MALIGNANT NEOPLASM OF BONE AND BONE MARROW (HCC): ICD-10-CM

## 2021-04-15 DIAGNOSIS — G89.3 CANCER RELATED PAIN: Chronic | ICD-10-CM

## 2021-04-15 DIAGNOSIS — N18.30 BENIGN HYPERTENSION WITH CKD (CHRONIC KIDNEY DISEASE) STAGE III (HCC): ICD-10-CM

## 2021-04-15 DIAGNOSIS — C34.92 ADENOCARCINOMA OF LEFT LUNG, STAGE 4 (HCC): Primary | ICD-10-CM

## 2021-04-15 DIAGNOSIS — I48.0 PAROXYSMAL ATRIAL FIBRILLATION (HCC): ICD-10-CM

## 2021-04-15 DIAGNOSIS — F32.5 MAJOR DEPRESSIVE DISORDER WITH SINGLE EPISODE, IN FULL REMISSION (HCC): ICD-10-CM

## 2021-04-15 DIAGNOSIS — C79.51 SECONDARY MALIGNANT NEOPLASM OF BONE AND BONE MARROW (HCC): ICD-10-CM

## 2021-04-15 DIAGNOSIS — G47.00 INSOMNIA, UNSPECIFIED TYPE: ICD-10-CM

## 2021-04-15 DIAGNOSIS — E11.9 TYPE 2 DIABETES MELLITUS WITHOUT COMPLICATION, WITHOUT LONG-TERM CURRENT USE OF INSULIN (HCC): ICD-10-CM

## 2021-04-15 DIAGNOSIS — I12.9 BENIGN HYPERTENSION WITH CKD (CHRONIC KIDNEY DISEASE) STAGE III (HCC): ICD-10-CM

## 2021-04-15 DIAGNOSIS — R11.2 CINV (CHEMOTHERAPY-INDUCED NAUSEA AND VOMITING): Chronic | ICD-10-CM

## 2021-04-15 DIAGNOSIS — Z51.5 PALLIATIVE CARE PATIENT: ICD-10-CM

## 2021-04-15 DIAGNOSIS — R53.83 OTHER FATIGUE: ICD-10-CM

## 2021-04-15 DIAGNOSIS — I50.32 CHRONIC DIASTOLIC HEART FAILURE (HCC): ICD-10-CM

## 2021-04-15 PROCEDURE — 99213 OFFICE O/P EST LOW 20 MIN: CPT | Performed by: INTERNAL MEDICINE

## 2021-04-15 NOTE — PROGRESS NOTES
Follow-up with Palliative and Supportive Care  Vega Griffith 65 y o  female [id number]    ASSESSMENT & PLAN:  1  Adenocarcinoma of left lung, stage 4 ([facility] )    2  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)    3  Chronic diastolic heart failure (HCC)    4  Paroxysmal atrial fibrillation (HCC)    5  Stage 3b chronic kidney disease    6  Benign hypertension with CKD (chronic kidney disease) stage III ([place] [address] )    7  Type 2 diabetes mellitus without complication, without long-term current use of insulin ([place] [address] )    8  Gastroesophageal reflux disease without esophagitis    9  Major depressive disorder with single episode, in full remission ([place] [address] )    10  CINV (chemotherapy-induced nausea and vomiting)    11  Insomnia, unspecified type    12  Other fatigue    13  Cancer related pain    14  Palliative care patient           Patient denies major new symptoms, and feel her chronic symptoms are adequately managed  Continue venlafaxine, continue Tylenol, continue omeprazole   Emotional support provided   Medication safety issues addressed - no driving under the influence of narcotics, watch for adverse effects including AMS and respiratory depression, keep medications stored in a safe/locked environment  Reviewed notes from Surgical Oncology (4/13/21, 3/23/21), Nephrology (4/8/21), Medical Oncology (3/1/21), Radiation Oncology (3/17/21)   4/13/21 labs include Cr 1 87, albumin 3 0, Hb 11 4, WBC 5 46  TSH 1 880, VitD 73 1, A1c 6 5 on 4/1/21   MRI abdomen + MRCP 3/26/21: Multiple cystic lesions in the pancreas, 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  The largest 3 1 cm cystic lesion in the pancreatic body contains septations and has increased in size since 2018  [facility] PET CT 3/12/21: Slowly enlarging dominant left upper lung lesion with increasing FDG uptake, most concerning for viable tumor  Enlarging adjacent left upper lung nodule   Although this does not demonstrate significant FDG uptake, this remains concerning for a low-grade neoplasm  Consider tissue sampling if this would alter clinical management  Slowly increasing nodular infiltrate in the right medial lung base with increased FDG uptake  This may be inflammatory/infectious, with neoplasm also not excluded  Close follow-up or tissue sampling also suggested  Enlarging pancreatic cystic lesion without significant FDG uptake        Requested Prescriptions      No prescriptions requested or ordered in this encounter       Medications Discontinued During This Encounter   Medication Reason    dexamethasone (DECADRON) 4 mg tablet Therapy completed    ondansetron (ZOFRAN-ODT) 4 mg disintegrating tablet Therapy completed       Representatives have queried the patient's controlled substance dispensing history in the Prescription Drug Monitoring Program in compliance with regulations before I have prescribed any controlled substances  The prescription history is consistent with prescribed therapy and our practice policies  20+ minutes were spent face to face with patient with greater than 50% of the time spent in counseling or coordination of care including discussions of symptom assessment and management, medication review, psychosocial support, chart review, imaging review and lab review  All of the patient's questions were answered during this discussion  Return in about 6 months (around 10/15/2021)  SUBJECTIVE:  Chief Complaint   Patient presents with    Follow-up    Cancer    Constipation    Counseling        HPI    Tacho Perez is a 65 y o  female w/ stage IV lung cancer (diagnosed 2016) w/ osseous metastasis s/p chemotherapy + immunotherapy + RT, DM2, HFPEF, pAfib, CKD3, HTN+HLD  She follows w/ Ching Sexton PA-C + Dr Giacomo Real (Medical Oncology), [facility]  Most recently on maintenance therapy w/ Pembrolizumab (currently on hold)    Patient is known to [facility] clinic; last seen by me 1/21/21 for symptom management, psychosocial support  Visit today for same  Patient has initiated RT for her lung lesion; she is tolerating the RT w/o major issue (though she notes some post-RT fatigue)  Patients systemic treatments are on hold d/t renal issues; she is uncertain if they will continue in the future  Still, she maintains a positive attitude  She denies anxiousness, worry, dysphoric mood  She denies significant pain  She is looking forward to a visit from her daughter this coming weekend, and a family trip to [facility]  MD this coming July  Patient is able to get restorative sleep  Her appetite is fine and her weight is stable  She denies N/V  She adjusted her iron supplement to PM dosing and feels this resolved her constipation issues  PDMP shows no concerns  The following portions of the medical history were reviewed: past medical history, problem list, medication list, and social history      Current Outpatient Medications:     acetaminophen (TYLENOL) 325 mg tablet, Take 650 mg by mouth every 6 (six) hours as needed for mild pain, Disp: , Rfl:     apixaban (Eliquis) 5 mg, TAKE 1 TABLET TWICE A DAY, Disp: 180 tablet, Rfl: 3    Cholecalciferol (VITAMIN D) 2000 units CAPS, Take by mouth, Disp: , Rfl:     cyanocobalamin (VITAMIN B-12) 100 mcg tablet, Take by mouth daily, Disp: , Rfl:     Folic Acid 0 8 MG CAPS, daily , Disp: , Rfl:     furosemide (LASIX) 40 mg tablet, Take 1 tablet (40 mg total) by mouth as needed (leg swelling, fluid overload), Disp: , Rfl:     Iron, Ferrous Sulfate, 325 (65 Fe) MG TABS, Take 1 tablet by mouth daily, Disp: , Rfl:     linaGLIPtin (Tradjenta) 5 MG TABS, Take 5 mg by mouth daily, Disp: 90 tablet, Rfl: 3    losartan (COZAAR) 50 mg tablet, Take 1 tablet (50 mg total) by mouth daily, Disp: 90 tablet, Rfl: 3    metFORMIN (GLUCOPHAGE) 1000 MG tablet, Take 1 tablet (1,000 mg total) by mouth 2 (two) times a day with meals, Disp: 180 tablet, Rfl: 3    metoclopramide (REGLAN) 10 mg tablet, TAKE 1/2 TABLET (5MG TOTAL)3 TIMES A DAY BEFORE MEALS AS NEEDED FOR NAUSEA, Disp: 60 tablet, Rfl: 0    metoprolol tartrate (LOPRESSOR) 25 mg tablet, Take 1 tablet (25 mg total) by mouth every 12 (twelve) hours, Disp: 180 tablet, Rfl: 3    omeprazole (PriLOSEC) 20 mg delayed release capsule, Take 1 capsule (20 mg total) by mouth daily, Disp: 90 capsule, Rfl: 3    potassium chloride (K-DUR,KLOR-CON) 20 mEq tablet, Take 2 tablets (40 mEq total) by mouth daily as needed (low K+), Disp: , Rfl:     sotalol (BETAPACE) 80 mg tablet, Take 1 tablet (80 mg total) by mouth 2 (two) times a day 1 tab PO BID, Disp: 180 tablet, Rfl: 3    venlafaxine (EFFEXOR) 37 5 mg tablet, TAKE 1 TABLET DAILY, Disp: 90 tablet, Rfl: 3    Review of Systems   Constitutional: Positive for fatigue  Negative for appetite change and unexpected weight change  Eyes: Negative for pain and redness  Gastrointestinal: Negative for constipation, diarrhea, nausea and vomiting  Endocrine: Negative for polydipsia and polyphagia  Musculoskeletal: Positive for arthralgias  Allergic/Immunologic: Positive for immunocompromised state  Neurological: Negative for facial asymmetry  Psychiatric/Behavioral: Negative for dysphoric mood and sleep disturbance  The patient is not nervous/anxious  OBJECTIVE:  /72 (BP Location: Left arm, Patient Position: Sitting, Cuff Size: Standard)   Pulse 69   Temp (!) 96 1 °F (35 6 °C) (Tympanic)   Resp 16   Ht 5' 4" (1 626 m)   Wt 89 4 kg (197 lb)   LMP  (LMP Unknown)   SpO2 98%   BMI 33 81 kg/m²   Vital signs reviewed  Physical Exam:  Constitutional: Elderly female  Appears well-developed and well-nourished  In no acute physical or emotional distress  Head: Normocephalic and atraumatic  Eyes: EOM are normal  No ocular discharge  No scleral icterus  Neck: No visible adenopathy or masses  Respiratory: Effort normal  No stridor  No respiratory distress     Gastrointestinal: No abdominal distension  Musculoskeletal: No edema  Neurological: Alert, oriented and appropriately conversant  No focal deficits  Follows commands appropriately  Skin: No diaphoresis, no rashes seen on exposed areas of skin  Psychiatric: Displays a positive mood and a normal affect  Behavior, judgment and thought content appear normal      Klever Hunt MD  [facility] Palliative and Supportive [facility]      Portions of this document may have been created using dictation software and as such some "sound alike" terms may have been generated by the system  Do not hesitate to contact me with any questions or clarifications

## 2021-04-15 NOTE — PATIENT INSTRUCTIONS
It was a pleasure to see you again today  Thank you for coming in     · I'm glad you're not experiencing any major symptoms  Should problems develop (such as skin issues related to radiation therapy), please call my office  · Return in about 6 months  · No refills needed today; call us for refills on medications that we supply, as needed  · If something changes and you need to come in sooner, please call our office  PRESCRIPTION REFILL REMINDER:  All medication refills should be requested prior to [facility] on Friday  Any refill requests after noon on Friday would be addressed the following Monday  Please protect yourself from the novel Coronavirus (COVID-19)! The numbers of cases of Coronavirus are spiking in [address]  This is not a more dangerous virus, but a sign that more people in a community are spreading the virus  Please check the local disease reports near you if you consider travelling this summer  We do not advise travel to any community or State with a rising viral caseload   Wash your hands! Soap and water, or hand  with at least 60% alcohol, are both effective at killing the virus   Wear a mask! This will help protect others from any virus particles you might spread  Your mouth and nose BOTH need to be covered   Keep the distance! Keep 6 feet of distance from others people, even if they seem healthy  Keeping distance protects you from the other person's virus spread   I'm glad you completed your Pfizer vaccine series

## 2021-04-16 ENCOUNTER — PATIENT OUTREACH (OUTPATIENT)
Dept: HEMATOLOGY ONCOLOGY | Facility: CLINIC | Age: 71
End: 2021-04-16

## 2021-04-16 ENCOUNTER — APPOINTMENT (OUTPATIENT)
Dept: RADIATION ONCOLOGY | Facility: HOSPITAL | Age: 71
End: 2021-04-16
Attending: RADIOLOGY
Payer: MEDICARE

## 2021-04-16 PROCEDURE — 77386 HB NTSTY MODUL RAD TX DLVR CPLX: CPT | Performed by: RADIOLOGY

## 2021-04-16 NOTE — PROGRESS NOTES
Received call from patient  Patient stated she will be receiving her last treatment of radiation on 4/21/2021  She is scheduled for a chest CT ordered by Dr Kaylah Calloway on 4/28/2021  She questioned if it is too soon for the CT following radiation therapy  Informed her the chest CT was scheduled before it was decided she would be receiving radiation at the at is should be rescheduled to a later date  Informed her I will reach out to her radiation oncologist and Dr Kaylah Calloway to determine the correct timing for follow-up chest CT  Will call patient with new appointment once scheduled  Patient voiced understanding and was appreciative

## 2021-04-19 ENCOUNTER — OFFICE VISIT (OUTPATIENT)
Dept: FAMILY MEDICINE CLINIC | Facility: CLINIC | Age: 71
End: 2021-04-19
Payer: MEDICARE

## 2021-04-19 ENCOUNTER — APPOINTMENT (OUTPATIENT)
Dept: RADIATION ONCOLOGY | Facility: HOSPITAL | Age: 71
End: 2021-04-19
Attending: RADIOLOGY
Payer: MEDICARE

## 2021-04-19 VITALS
DIASTOLIC BLOOD PRESSURE: 62 MMHG | SYSTOLIC BLOOD PRESSURE: 130 MMHG | TEMPERATURE: 97.5 F | RESPIRATION RATE: 16 BRPM | WEIGHT: 196.6 LBS | HEART RATE: 66 BPM | OXYGEN SATURATION: 100 % | HEIGHT: 64 IN | BODY MASS INDEX: 33.57 KG/M2

## 2021-04-19 DIAGNOSIS — I48.91 ATRIAL FIBRILLATION, UNSPECIFIED TYPE (HCC): ICD-10-CM

## 2021-04-19 DIAGNOSIS — E11.9 TYPE 2 DIABETES MELLITUS WITHOUT COMPLICATION, WITHOUT LONG-TERM CURRENT USE OF INSULIN (HCC): Primary | ICD-10-CM

## 2021-04-19 DIAGNOSIS — E11.9 CONTROLLED TYPE 2 DIABETES MELLITUS WITHOUT COMPLICATION, WITHOUT LONG-TERM CURRENT USE OF INSULIN (HCC): ICD-10-CM

## 2021-04-19 DIAGNOSIS — K21.9 GERD WITHOUT ESOPHAGITIS: ICD-10-CM

## 2021-04-19 DIAGNOSIS — F32.5 MAJOR DEPRESSIVE DISORDER WITH SINGLE EPISODE, IN FULL REMISSION (HCC): ICD-10-CM

## 2021-04-19 DIAGNOSIS — C34.92 ADENOCARCINOMA OF LEFT LUNG, STAGE 4 (HCC): ICD-10-CM

## 2021-04-19 DIAGNOSIS — I50.30 HYPERTENSIVE HEART DISEASE WITH DIASTOLIC CONGESTIVE HEART FAILURE, UNSPECIFIED HF CHRONICITY (HCC): ICD-10-CM

## 2021-04-19 DIAGNOSIS — I11.0 HYPERTENSIVE HEART DISEASE WITH DIASTOLIC CONGESTIVE HEART FAILURE, UNSPECIFIED HF CHRONICITY (HCC): ICD-10-CM

## 2021-04-19 DIAGNOSIS — D63.1 ANEMIA IN STAGE 3B CHRONIC KIDNEY DISEASE (HCC): ICD-10-CM

## 2021-04-19 DIAGNOSIS — I48.0 PAROXYSMAL ATRIAL FIBRILLATION (HCC): ICD-10-CM

## 2021-04-19 DIAGNOSIS — C41.9 MALIGNANT NEOPLASM OF BONE WITH METASTASES (HCC): ICD-10-CM

## 2021-04-19 DIAGNOSIS — N18.32 ANEMIA IN STAGE 3B CHRONIC KIDNEY DISEASE (HCC): ICD-10-CM

## 2021-04-19 PROCEDURE — 77386 HB NTSTY MODUL RAD TX DLVR CPLX: CPT | Performed by: RADIOLOGY

## 2021-04-19 PROCEDURE — 99214 OFFICE O/P EST MOD 30 MIN: CPT | Performed by: FAMILY MEDICINE

## 2021-04-19 NOTE — PROGRESS NOTES
Assessment/Plan:    1  Type 2 diabetes mellitus without complication, without long-term current use of insulin (HCC)  Assessment & Plan:  Stable and improved  Lab Results   Component Value Date    HGBA1C 6 5 (H) 04/01/2021       Orders:  -     Hemoglobin A1C; Future; Expected date: 10/01/2021    2  Paroxysmal atrial fibrillation [facility])  Assessment & Plan: On eliquis      3  Hypertensive heart disease with diastolic congestive heart failure, unspecified HF chronicity ([facility] )  Assessment & Plan:  Wt Readings from Last 3 Encounters:   04/19/21 89 2 kg (196 lb 9 6 oz)   04/15/21 89 4 kg (197 lb)   04/13/21 89 4 kg (197 lb)     Stable  Seeing cardiology          4  Malignant neoplasm of bone with metastases ([facility] )  Assessment & Plan:  stable      5  Anemia in stage 3b chronic kidney disease  Assessment & Plan:  Lab Results   Component Value Date    EGFR 26 04/13/2021    EGFR 26 04/01/2021    EGFR 24 03/01/2021    CREATININE 1 87 (H) 04/13/2021    CREATININE 1 87 (H) 04/01/2021    CREATININE 2 02 (H) 03/01/2021   stable  Seeing nephrology      6  Major depressive disorder with single episode, in full remission [facility])  Assessment & Plan:  Stable on effexor      7  Adenocarcinoma of left lung, stage 4 [facility])  Assessment & Plan:  Seeing Dr Keren Martinez      8  Atrial fibrillation, unspecified type [facility])  Assessment & Plan:  Stable on eliquis      BMI Counseling: Body mass index is 33 75 kg/m²  The BMI is above normal  Nutrition recommendations include encouraging healthy choices of fruits and vegetables  Exercise recommendations include exercising 3-5 times per week  No pharmacotherapy was ordered  There are no Patient Instructions on file for this visit  No follow-ups on file  Subjective:      Patient ID: Arabella Angelo is a 65 y o  female      Chief Complaint   Patient presents with    Follow-up     PT is being seen for a 4 month f/u       Here for follow up  Now on radiation treatment 8 trreatments for stage 4 lung cancer  Also has pancreatic cyst-seen by surgery-will monitor for now  Kidneys stable  Labs reviewed    Hypertension  This is a chronic problem  The current episode started more than 1 year ago  The problem is unchanged  The problem is controlled  There are no associated agents to hypertension  Risk factors for coronary artery disease include diabetes mellitus and dyslipidemia  Past treatments include angiotensin blockers  The current treatment provides significant improvement  There are no compliance problems  Diabetes  She presents for her follow-up diabetic visit  She has type 2 diabetes mellitus  Her disease course has been stable  There are no hypoglycemic associated symptoms  Associated symptoms include fatigue  There are no hypoglycemic complications  Symptoms are stable  There are no diabetic complications  Risk factors for coronary artery disease include diabetes mellitus and hypertension  She is compliant with treatment all of the time  Her weight is stable  She is following a diabetic diet  She has not had a previous visit with a dietitian  She rarely participates in exercise  There is no change in her home blood glucose trend  An ACE inhibitor/angiotensin II receptor blocker is being taken  She does not see a podiatrist Eye exam is current  The following portions of the patient's history were reviewed and updated as appropriate: allergies, current medications, past family history, past medical history, past social history, past surgical history and problem list     Review of Systems   Constitutional: Positive for fatigue  HENT: Negative  Eyes: Negative  Respiratory: Negative  Cardiovascular: Negative  Gastrointestinal: Negative  Endocrine: Negative  Genitourinary: Negative  Musculoskeletal: Positive for arthralgias (knee pain)  Allergic/Immunologic: Negative  Neurological: Negative  Hematological: Negative  Psychiatric/Behavioral: Negative            Current Outpatient Medications   Medication Sig Dispense Refill    acetaminophen (TYLENOL) 325 mg tablet Take 650 mg by mouth every 6 (six) hours as needed for mild pain      apixaban (Eliquis) 5 mg TAKE 1 TABLET TWICE A  tablet 3    Cholecalciferol (VITAMIN D) 2000 units CAPS Take by mouth      cyanocobalamin (VITAMIN B-12) 100 mcg tablet Take by mouth daily      Folic Acid 0 8 MG CAPS daily       furosemide (LASIX) 40 mg tablet Take 1 tablet (40 mg total) by mouth as needed (leg swelling, fluid overload)      Iron, Ferrous Sulfate, 325 (65 Fe) MG TABS Take 1 tablet by mouth daily      linaGLIPtin (Tradjenta) 5 MG TABS Take 5 mg by mouth daily 90 tablet 3    losartan (COZAAR) 50 mg tablet Take 1 tablet (50 mg total) by mouth daily 90 tablet 3    metFORMIN (GLUCOPHAGE) 1000 MG tablet Take 1 tablet (1,000 mg total) by mouth 2 (two) times a day with meals 180 tablet 3    metoclopramide (REGLAN) 10 mg tablet TAKE 1/2 TABLET (5MG TOTAL)3 TIMES A DAY BEFORE MEALS AS NEEDED FOR NAUSEA 60 tablet 0    metoprolol tartrate (LOPRESSOR) 25 mg tablet Take 1 tablet (25 mg total) by mouth every 12 (twelve) hours 180 tablet 3    omeprazole (PriLOSEC) 20 mg delayed release capsule Take 1 capsule (20 mg total) by mouth daily 90 capsule 3    potassium chloride (K-DUR,KLOR-CON) 20 mEq tablet Take 2 tablets (40 mEq total) by mouth daily as needed (low K+)      sotalol (BETAPACE) 80 mg tablet Take 1 tablet (80 mg total) by mouth 2 (two) times a day 1 tab PO  tablet 3    venlafaxine (EFFEXOR) 37 5 mg tablet TAKE 1 TABLET DAILY 90 tablet 3     No current facility-administered medications for this visit  Objective:    /62   Pulse 66   Temp 97 5 °F (36 4 °C) (Tympanic)   Resp 16   Ht 5' 4" (1 626 m)   Wt 89 2 kg (196 lb 9 6 oz)   LMP  (LMP Unknown)   SpO2 100%   BMI 33 75 kg/m²        Physical Exam  Vitals signs and nursing note reviewed  Constitutional:       Appearance: Normal appearance  HENT:      Head: Normocephalic and atraumatic  Eyes:      Extraocular Movements: Extraocular movements intact  Pupils: Pupils are equal, round, and reactive to light  Neck:      Musculoskeletal: Normal range of motion and neck supple  Cardiovascular:      Rate and Rhythm: Normal rate and regular rhythm  Pulses: Normal pulses  Heart sounds: Normal heart sounds  Pulmonary:      Effort: Pulmonary effort is normal       Breath sounds: Normal breath sounds  Abdominal:      General: Abdomen is flat  Palpations: Abdomen is soft  Musculoskeletal: Normal range of motion  Skin:     General: Skin is warm and dry  Capillary Refill: Capillary refill takes less than 2 seconds  Neurological:      General: No focal deficit present  Mental Status: She is alert and oriented to person, place, and time  Psychiatric:         Mood and Affect: Mood normal          Behavior: Behavior normal          Thought Content:  Thought content normal          Judgment: Judgment normal                 Sarah Beltre,

## 2021-04-19 NOTE — ASSESSMENT & PLAN NOTE
Wt Readings from Last 3 Encounters:   04/19/21 89 2 kg (196 lb 9 6 oz)   04/15/21 89 4 kg (197 lb)   04/13/21 89 4 kg (197 lb)     Stable  Seeing cardiology

## 2021-04-19 NOTE — ASSESSMENT & PLAN NOTE
Lab Results   Component Value Date    EGFR 26 04/13/2021    EGFR 26 04/01/2021    EGFR 24 03/01/2021    CREATININE 1 87 (H) 04/13/2021    CREATININE 1 87 (H) 04/01/2021    CREATININE 2 02 (H) 03/01/2021   stable  Seeing nephrology

## 2021-04-19 NOTE — TELEPHONE ENCOUNTER
Medication: metFORMIN (GLUCOPHAGE  Dosage: 1000 MG tablet   How Often: Take 1 tablet (1,000 mg total) by mouth 2 (two) times a day with meals  Quantity:  180  Last Office Visit: 04/19/21  Next Office Visit: 10/18/21  Last refilled: 11/25/20  How many pills left:2  Pharmacy:     [facility] [address]  Phone: [phone] Fax: [phone]    Medication: omeprazole (PriLOSEC  Dosage: 20 mg delayed release capsule   How Often: Take 1 capsule (20 mg total) by mouth daily  Quantity:  90  Last Office Visit: 04/19/21  Next Office Visit: 10/18/21  Last refilled: 07/15/20  How many pills left: 2  Pharmacy:     [facility] [address] [place] [address]  Phone: [phone] Fax: [phone]    Medication: linaGLIPtin Alysia Goldberg  Dosage: 5 MG TABS   How Often: Take 5 mg by mouth daily  Quantity:  90  Last Office Visit: 04/19/21  Next Office Visit: 10/18/21  Last refilled: 05/26/20  How many pills left: 2  Pharmacy:     [facility] [address]  Phone: [phone] Fax: [phone]

## 2021-04-20 ENCOUNTER — PATIENT OUTREACH (OUTPATIENT)
Dept: HEMATOLOGY ONCOLOGY | Facility: CLINIC | Age: 71
End: 2021-04-20

## 2021-04-20 DIAGNOSIS — E11.9 CONTROLLED TYPE 2 DIABETES MELLITUS WITHOUT COMPLICATION, WITHOUT LONG-TERM CURRENT USE OF INSULIN (HCC): ICD-10-CM

## 2021-04-20 DIAGNOSIS — K21.9 GERD WITHOUT ESOPHAGITIS: ICD-10-CM

## 2021-04-20 RX ORDER — LINAGLIPTIN 5 MG/1
5 TABLET, FILM COATED ORAL DAILY
Qty: 90 TABLET | Refills: 3 | Status: SHIPPED | OUTPATIENT
Start: 2021-04-20 | End: 2022-04-22 | Stop reason: SDUPTHER

## 2021-04-20 RX ORDER — OMEPRAZOLE 20 MG/1
20 CAPSULE, DELAYED RELEASE ORAL DAILY
Qty: 90 CAPSULE | Refills: 3 | Status: SHIPPED | OUTPATIENT
Start: 2021-04-20 | End: 2021-04-20 | Stop reason: SDUPTHER

## 2021-04-20 RX ORDER — OMEPRAZOLE 20 MG/1
20 CAPSULE, DELAYED RELEASE ORAL DAILY
Qty: 90 CAPSULE | Refills: 3 | Status: SHIPPED | OUTPATIENT
Start: 2021-04-20 | End: 2022-04-26 | Stop reason: SDUPTHER

## 2021-04-20 RX ORDER — LINAGLIPTIN 5 MG/1
5 TABLET, FILM COATED ORAL DAILY
Qty: 90 TABLET | Refills: 3 | Status: SHIPPED | OUTPATIENT
Start: 2021-04-20 | End: 2021-04-20 | Stop reason: SDUPTHER

## 2021-04-20 NOTE — PROGRESS NOTES
Patient returned my call and asked if I could reschedule her CT scan and F/U with Dr Melani Palmer  CT was rescheduled for Wednesday, 6/9/21 at 11:00 at the [facility] and her  Dr Melani Palmer follow-up was moved to Monday, 6/14/21 at 9:20 at the [facility]  Patient was appreciative

## 2021-04-20 NOTE — PROGRESS NOTES
Called and left message on patient's voicemail  Informed patient Dr Leopoldo Prose would like her to have chest CT 6-8 weeks after completing RT and F/U with Dr Leopoldo Prose to follow  Inquired if she would like me to schedule the CT  Requested return call

## 2021-04-21 ENCOUNTER — APPOINTMENT (OUTPATIENT)
Dept: RADIATION ONCOLOGY | Facility: HOSPITAL | Age: 71
End: 2021-04-21
Attending: RADIOLOGY
Payer: MEDICARE

## 2021-04-21 PROCEDURE — 77336 RADIATION PHYSICS CONSULT: CPT | Performed by: RADIOLOGY

## 2021-04-21 PROCEDURE — 77386 HB NTSTY MODUL RAD TX DLVR CPLX: CPT | Performed by: RADIOLOGY

## 2021-04-22 ENCOUNTER — TELEPHONE (OUTPATIENT)
Dept: HEMATOLOGY ONCOLOGY | Facility: CLINIC | Age: 71
End: 2021-04-22

## 2021-04-22 ENCOUNTER — PATIENT OUTREACH (OUTPATIENT)
Dept: HEMATOLOGY ONCOLOGY | Facility: CLINIC | Age: 71
End: 2021-04-22

## 2021-04-22 NOTE — PROGRESS NOTES
Per Dr Spencer Hillman and Dr Kaylah Calloway, the 6/9/21 follow-up chest CT is appropriately timed  Called and informed patient

## 2021-04-22 NOTE — PROGRESS NOTES
Patient called and stated after her last radiation treatment yesterday, Dr Avtar Fragoso told her the follow-up CT of the chest scheduled for 6/9/2021 was too soon after completing RT  Informed patient I would reach out to both Dr Avtar Fragoso and Dr Shraddha Jefferson for a consensus on the appropriate timing for the follow-up CT and then schedule accordingly  Patient was appreciative

## 2021-05-17 DIAGNOSIS — I48.91 ATRIAL FIBRILLATION, UNSPECIFIED TYPE (HCC): ICD-10-CM

## 2021-05-20 ENCOUNTER — TELEPHONE (OUTPATIENT)
Dept: NEPHROLOGY | Facility: CLINIC | Age: 71
End: 2021-05-20

## 2021-06-09 ENCOUNTER — HOSPITAL ENCOUNTER (OUTPATIENT)
Dept: CT IMAGING | Facility: HOSPITAL | Age: 71
Discharge: HOME | End: 2021-06-09
Attending: INTERNAL MEDICINE
Payer: MEDICARE

## 2021-06-09 DIAGNOSIS — C41.9 MALIGNANT NEOPLASM OF BONE WITH METASTASES (HCC): ICD-10-CM

## 2021-06-09 PROCEDURE — G1004 CDSM NDSC: HCPCS

## 2021-06-09 PROCEDURE — 71250 CT THORAX DX C-: CPT

## 2021-06-11 ENCOUNTER — APPOINTMENT (OUTPATIENT)
Dept: LAB | Facility: AMBULARY SURGERY CENTER | Age: 71
End: 2021-06-11
Payer: MEDICARE

## 2021-06-11 ENCOUNTER — TRANSCRIBE ORDERS (OUTPATIENT)
Dept: LAB | Facility: AMBULARY SURGERY CENTER | Age: 71
End: 2021-06-11

## 2021-06-11 ENCOUNTER — TELEPHONE (OUTPATIENT)
Dept: HEMATOLOGY ONCOLOGY | Facility: CLINIC | Age: 71
End: 2021-06-11

## 2021-06-11 DIAGNOSIS — C34.92 ADENOCARCINOMA OF LEFT LUNG, STAGE 4 (HCC): Primary | ICD-10-CM

## 2021-06-11 DIAGNOSIS — C34.92 ADENOCARCINOMA OF LEFT LUNG, STAGE 4 (HCC): ICD-10-CM

## 2021-06-11 DIAGNOSIS — M79.89 LEFT LEG SWELLING: ICD-10-CM

## 2021-06-11 LAB
ALBUMIN SERPL BCP-MCNC: 3.1 G/DL (ref 3.5–5)
ALP SERPL-CCNC: 97 U/L (ref 46–116)
ALT SERPL W P-5'-P-CCNC: 17 U/L (ref 12–78)
ANION GAP SERPL CALCULATED.3IONS-SCNC: 5 MMOL/L (ref 4–13)
AST SERPL W P-5'-P-CCNC: 22 U/L (ref 5–45)
BASOPHILS # BLD AUTO: 0.05 THOUSANDS/ΜL (ref 0–0.1)
BASOPHILS NFR BLD AUTO: 1 % (ref 0–1)
BILIRUB SERPL-MCNC: 0.29 MG/DL (ref 0.2–1)
BUN SERPL-MCNC: 31 MG/DL (ref 5–25)
CALCIUM ALBUM COR SERPL-MCNC: 10.2 MG/DL (ref 8.3–10.1)
CALCIUM SERPL-MCNC: 9.5 MG/DL (ref 8.3–10.1)
CHLORIDE SERPL-SCNC: 111 MMOL/L (ref 100–108)
CO2 SERPL-SCNC: 25 MMOL/L (ref 21–32)
CREAT SERPL-MCNC: 1.75 MG/DL (ref 0.6–1.3)
EOSINOPHIL # BLD AUTO: 0.28 THOUSAND/ΜL (ref 0–0.61)
EOSINOPHIL NFR BLD AUTO: 5 % (ref 0–6)
ERYTHROCYTE [DISTWIDTH] IN BLOOD BY AUTOMATED COUNT: 12.9 % (ref 11.6–15.1)
GFR SERPL CREATININE-BSD FRML MDRD: 29 ML/MIN/1.73SQ M
GLUCOSE SERPL-MCNC: 162 MG/DL (ref 65–140)
HCT VFR BLD AUTO: 36.2 % (ref 34.8–46.1)
HGB BLD-MCNC: 11.4 G/DL (ref 11.5–15.4)
IMM GRANULOCYTES # BLD AUTO: 0.01 THOUSAND/UL (ref 0–0.2)
IMM GRANULOCYTES NFR BLD AUTO: 0 % (ref 0–2)
LYMPHOCYTES # BLD AUTO: 0.84 THOUSANDS/ΜL (ref 0.6–4.47)
LYMPHOCYTES NFR BLD AUTO: 16 % (ref 14–44)
MCH RBC QN AUTO: 28.2 PG (ref 26.8–34.3)
MCHC RBC AUTO-ENTMCNC: 31.5 G/DL (ref 31.4–37.4)
MCV RBC AUTO: 90 FL (ref 82–98)
MONOCYTES # BLD AUTO: 0.41 THOUSAND/ΜL (ref 0.17–1.22)
MONOCYTES NFR BLD AUTO: 8 % (ref 4–12)
NEUTROPHILS # BLD AUTO: 3.6 THOUSANDS/ΜL (ref 1.85–7.62)
NEUTS SEG NFR BLD AUTO: 70 % (ref 43–75)
NRBC BLD AUTO-RTO: 0 /100 WBCS
PLATELET # BLD AUTO: 218 THOUSANDS/UL (ref 149–390)
PMV BLD AUTO: 11.3 FL (ref 8.9–12.7)
POTASSIUM SERPL-SCNC: 4.4 MMOL/L (ref 3.5–5.3)
PROT SERPL-MCNC: 6.7 G/DL (ref 6.4–8.2)
RBC # BLD AUTO: 4.04 MILLION/UL (ref 3.81–5.12)
SODIUM SERPL-SCNC: 141 MMOL/L (ref 136–145)
WBC # BLD AUTO: 5.19 THOUSAND/UL (ref 4.31–10.16)

## 2021-06-11 PROCEDURE — 80053 COMPREHEN METABOLIC PANEL: CPT

## 2021-06-11 PROCEDURE — 36415 COLL VENOUS BLD VENIPUNCTURE: CPT

## 2021-06-11 PROCEDURE — 85025 COMPLETE CBC W/AUTO DIFF WBC: CPT

## 2021-06-14 ENCOUNTER — OFFICE VISIT (OUTPATIENT)
Dept: HEMATOLOGY ONCOLOGY | Facility: CLINIC | Age: 71
End: 2021-06-14
Payer: MEDICARE

## 2021-06-14 VITALS
WEIGHT: 200 LBS | BODY MASS INDEX: 34.15 KG/M2 | RESPIRATION RATE: 16 BRPM | SYSTOLIC BLOOD PRESSURE: 132 MMHG | HEART RATE: 61 BPM | HEIGHT: 64 IN | OXYGEN SATURATION: 98 % | DIASTOLIC BLOOD PRESSURE: 74 MMHG | TEMPERATURE: 97.5 F

## 2021-06-14 DIAGNOSIS — C34.91 MALIGNANT NEOPLASM OF UNSPECIFIED PART OF RIGHT BRONCHUS OR LUNG (HCC): Primary | ICD-10-CM

## 2021-06-14 DIAGNOSIS — C41.9 MALIGNANT NEOPLASM OF BONE WITH METASTASES (HCC): ICD-10-CM

## 2021-06-14 PROCEDURE — 99214 OFFICE O/P EST MOD 30 MIN: CPT | Performed by: INTERNAL MEDICINE

## 2021-06-14 NOTE — PROGRESS NOTES
Hematology Outpatient Follow - Up Note  Ernie Griffith 65 y o  female MRN: @ Encounter: [id number]        Date:  6/14/2021        Assessment/ Plan:    64-year-old  female with history of stage 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 of the lung with left scapula involvement diagnosed on 08/2016 PDL expression more than 50%, negative for EGFR mutation, ALK  rearrangement, Ros1 mutation     Treated initially with Pembrolizumab complicated with pneumonitis and later on nivolumab with prednisone 10 mg p o  Daily with excellent response      Disease progression in August 2018 in the left scapula with pain no new lesions by PET scan   Status post radiation therapy to the left scapula and treated with Alimta 500 milligram/meter squared, carboplatin AUC 5   After 3 cycles,  CT scan in January 2019 showed stable disease, currently on maintenance Alimta 500 milligram/meter squared every 3 weeks           CT scan 4/2020 showed stable disease in the left upper lobe of the lung , no evidence of new lesions     Alimta dose was reduced to 400 milligram/meter IV as creatinine came down to 1 28  Subsequent CT scan showed stable disease on 11/2020, no new lesions     Pemetrexed dose was reduced to 300 milligram/meter squared every 4 weeks   Cycle #33 due 2/2/21      CT scan of the chest on 02/22/2021 showed stable left lobe lung nodules, status post SB RT to the left upper lobe lung nodule we hold pemetrexed because of chronic kidney disease     CT scan in June 2021 of the chest showed right lower lobe lung nodule johnie increasing in size it might be inflammation versus progressive of disease, will do PET scan in 1 month and will proceed from there        Labs and imaging studies are reviewed by ordering provider once results are available  If there are findings that need immediate attention, you will be contacted when results available     Discussing results and the implication on your healthcare is best discussed in person at your follow-up visit  HPI:    Ivett Gomes was admitted to the hospital with arrhythmia and was found to have right lower lobe infiltrate in August 2016   She wasreated with antibiotics however repeat chest x-ray showed persistent right lower lobe infiltrate  Subsequently the patient had a CT scan of the chest which showed a right perihilar mass, subcarinal lymphadenopathy, lytic lesion of the right costovertebral junction at T10 level   PET scan October 2016 showed a right perihilar mass measuring 3 5 cm with SUV of 8 9, subcarinal lymph nodes measuring 3 4 x 2 2 cm with SUV of 9 2  Nodule in the left upper lobe lung measured 2 x 1 1 cm   There was a lytic lesion involving the right 10th costovertebral junction with SUV of 14  4      Biopsy showed non-small cell carcinoma with features suggesting of adenocarcinoma positive for CK 7, CK 19, CA-19-9, ANEUDY-3, partially positive for P40, p63, negative for TTF-1   She had a history of uterine cancer in 2000 status post hysterectomy and did not require radiation or chemotherapy   She is status post bilateral oophorectomy, right knee replacement,   tonsillectomy       She used to smoke for 35 years 1 pack per day however quit smoking 21 years ago   She used hormonal replacement therapy for 3 years  Mey Elizondo has a family history significant for skin cancer in her father and coronary artery disease in mother  Mey Elizondo has 2 healthy children      Treated initially with Pembrolizumab December 2016, Finished in May 2017 secondary to grade 3 pneumonitis  Initiated on prednisone     Progression: Nivolumab 240 mg flat dose every 2 weeks along with prednisone 10 mg p o   Daily initiated April 2018- October 2018 with excellent response      Liquid biopsy showed K-MADHURI mutation G12C, no evidence of MSI high        Disease progression in August 2018 in the left scapula with pain no new lesions by PET scan   Status post radiation therapy to the left scapula and treated with Alimta 500 milligram/meter squared, carboplatin AUC 5   After 3 cycles,  CT scan in January 2019 showed stable disease  Carbo discontinued 3/2019    Maintenance Alimta 500 milligram/meter squared every 3 weeks initiated 3/2019        Cr 2/20 was 1 5   Plan was to dose reduce Alimta to 400mg/m2; however cr 2 16 2/24/20 and Alimta was held       3/4/20:  renal u/s  Minimal fullness of the left renal collecting system without matthieu hydronephrosis  Chronic right kidney lower pole cortical scar, with adjacent parenchymal calcification measuring 5 mm       She was on prednisone 5 mg p o  daily because of previous history of pneumonitis  CT scan chest 1/3/2020 showed no evidence of infiltration in the lung parenchyma, prednisone was reduced every other day for 1 month and then discontinued        CT scan 4/2020 showed stable disease in the left upper lobe of the lung     CT scan on 07/2020 showed stable disease in the left upper lobe of the lung, pancreatic cyst 2 3 cm, stable from the previous CT scan however bigger from last year CT scan       Chronic LLE edema since fall she had 2/2020    Venous doppler negative for clot  CT scan in February 2021 showed stable left upper lobe lung nodule however with progressive chronic kidney disease we decided to hold pemetrexed, status post SBRT to the left upper lobe lung nodule    CT scan of the chest in June 2021 showed increase in the size of the right lower lobe lung mass may be progression of disease versus inflammation/ infection       Interval History:        Previous Treatment:         Test Results:    Imaging: CT chest wo contrast    Result Date: 6/11/2021  Narrative: CT CHEST WITHOUT IV CONTRAST INDICATION:   C41 9: 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, unspecified  Stage IV adenocarcinoma of the lung, primary in the left upper lobe with left scapular involvement diagnosed in August 2016   COMPARISON:  PET CT from 3/12/2021, chest CT from 2/22/2021, 11/17/2020, and 1/3/2020  TECHNIQUE: Chest CT without intravenous contrast   Axial, sagittal, coronal 2D reformats and coronal MIPS from source data  Radiation dose length product (DLP):  505 mGy-cm   Radiation dose exposure minimized using iterative reconstruction and automated exposure control  FINDINGS: LUNGS:  Continued enlargement of the right lower lobe paraspinal opacity, now 3 5 x 1 9 cm (3/97), increased from 2 8 x 1 3 cm on 3/12/2021 and 1 6 x 1 2 cm on 2/22/2021  Mild new peripheral groundglass opacity in the left upper lobe (3/38-47)  2 6 x 1 5 cm left upper lobe tumor (3/44) and adjacent 1 3 x 1 2 cm nodule (3/42) and 8 x 7 mm left lower lobe nodule (3/91), stable since March 2021  AIRWAYS: No significant filling defects  PLEURA:  Unremarkable  HEART/GREAT VESSELS:  Normal heart size  Mild coronary artery calcification indicating atherosclerotic heart disease  MEDIASTINUM AND ANABEL:  Unremarkable  CHEST WALL AND LOWER NECK: Unremarkable  UPPER ABDOMEN:  Redemonstration of 2 9 x 2 8 cm cystic lesion in the mid body of the pancreas  Stable subcentimeter non-FDG avid left adrenal nodule (2/51)  Calcified right renal scar  Cholecystectomy  OSSEOUS STRUCTURES: Mild degenerative disease in the spine  Impression: Continued enlargement of the right lower lobe paraspinal masslike opacity since March 2021 and February 2021  This could be malignant but the rapid rate of growth suggests an infectious/inflammatory process  Mild new peripheral groundglass opacity in the left upper lobe, likely inflammatory  Stable left upper lobe tumor, adjacent left upper lobe nodule, and left lower lobe nodule since March 2021  Redemonstration of large cystic lesion in the mid body of the pancreas  This study was marked for significant notification and follow-up    Workstation performed: [id number]       Labs:   Lab Results   Component Value Date    WBC 5 19 06/11/2021    HGB 11 4 (L) 06/11/2021    HCT 36 2 06/11/2021    MCV 90 06/11/2021     06/11/2021     Lab Results   Component Value Date     11/23/2015    K 4 4 06/11/2021     (H) 06/11/2021    CO2 25 06/11/2021    ANIONGAP 9 11/23/2015    BUN 31 (H) 06/11/2021    CREATININE 1 75 (H) 06/11/2021    GLUCOSE 149 (H) 11/23/2015    GLUF 142 (H) 04/13/2021    CALCIUM 9 5 06/11/2021    CORRECTEDCA 10 2 (H) 06/11/2021    AST 22 06/11/2021    ALT 17 06/11/2021    ALKPHOS 97 06/11/2021    PROT 6 8 11/23/2015    BILITOT 0 65 11/23/2015    EGFR 29 06/11/2021       Lab Results   Component Value Date    IRON 40 (L) 04/01/2021    TIBC 302 04/01/2021    FERRITIN 53 04/01/2021       No results found for: [id number]      ROS: Review of Systems   Constitutional: Negative for appetite change, chills, diaphoresis, fatigue and unexpected weight change  HENT:   Negative for mouth sores, nosebleeds, sore throat, trouble swallowing and voice change  Eyes: Negative for eye problems and icterus  Respiratory: Negative for chest tightness, cough, hemoptysis and wheezing  Cardiovascular: Negative for chest pain, leg swelling and palpitations  Gastrointestinal: Negative for abdominal distention, abdominal pain, blood in stool, constipation, diarrhea, nausea and vomiting  Endocrine: Negative for hot flashes  Genitourinary: Negative for bladder incontinence, difficulty urinating, dyspareunia, dysuria and frequency  Musculoskeletal: Negative for arthralgias, back pain, gait problem, neck pain and neck stiffness  Skin: Negative for itching and rash  Neurological: Negative for dizziness, gait problem, headaches, numbness, seizures and speech difficulty  Hematological: Negative for adenopathy  Does not bruise/bleed easily  Psychiatric/Behavioral: Negative for decreased concentration, depression, sleep disturbance and suicidal ideas  The patient is not nervous/anxious             Current Medications: Reviewed  Allergies: Reviewed  PMH/FH/SH: Reviewed      Physical Exam:    Body surface area is 1 96 meters squared  Wt Readings from Last 3 Encounters:   21 90 7 kg (200 lb)   21 89 2 kg (196 lb 9 6 oz)   04/15/21 89 4 kg (197 lb)        Temp Readings from Last 3 Encounters:   21 97 5 °F (36 4 °C) (Temporal)   21 97 5 °F (36 4 °C) (Tympanic)   04/15/21 (!) 96 1 °F (35 6 °C) (Tympanic)        BP Readings from Last 3 Encounters:   21 132/74   21 130/62   04/15/21 138/72         Pulse Readings from Last 3 Encounters:   21 61   21 66   04/15/21 69        Physical Exam  Vitals reviewed  Constitutional:       General: She is not in acute distress  Appearance: She is well-developed  She is not diaphoretic  HENT:      Head: Normocephalic and atraumatic  Eyes:      Conjunctiva/sclera: Conjunctivae normal    Neck:      Trachea: No tracheal deviation  Cardiovascular:      Rate and Rhythm: Normal rate and regular rhythm  Heart sounds: No murmur heard  No friction rub  No gallop  Pulmonary:      Effort: Pulmonary effort is normal  No respiratory distress  Breath sounds: Normal breath sounds  No wheezing or rales  Chest:      Chest wall: No tenderness  Abdominal:      General: There is no distension  Palpations: Abdomen is soft  Tenderness: There is no abdominal tenderness  Musculoskeletal:      Cervical back: Normal range of motion and neck supple  Right lower leg: No edema  Left lower leg: No edema  Lymphadenopathy:      Cervical: No cervical adenopathy  Skin:     General: Skin is warm and dry  Coloration: Skin is not pale  Findings: No erythema  Neurological:      Mental Status: She is alert and oriented to person, place, and time  Psychiatric:         Behavior: Behavior normal          Thought Content: Thought content normal          Judgment: Judgment normal          ECO    Goals and Barriers:  Current Goal: Minimize effects of disease  Barriers: None  Patient's Capacity to Self Care:  Patient is able to self care      Code Status: @[facility]@

## 2021-06-16 ENCOUNTER — TELEMEDICINE (OUTPATIENT)
Dept: RADIATION ONCOLOGY | Facility: HOSPITAL | Age: 71
End: 2021-06-16
Attending: RADIOLOGY

## 2021-06-16 DIAGNOSIS — C34.91 MALIGNANT NEOPLASM OF UNSPECIFIED PART OF RIGHT BRONCHUS OR LUNG (HCC): Primary | ICD-10-CM

## 2021-06-16 NOTE — PROGRESS NOTES
Virtual Brief Visit    Assessment/Plan: Ms Damaris Mcnamara  Has done well since completion of stereotactic lung radiation  She does have an occasional cough but denies any shortness of breath or dyspnea on exertion  She will have a PET-CT in mid July, nearly 3 months status post completion of her stereotactic treatment and we are optimistic that there will be shrinkage of the recently treated left lung lesions  She also has a nonspecific 3 cm consolidation in the right lower lung which is suspicious for slowly progressing metastatic disease  This is something that would be amenable to radiation as well  We have scheduled her for a 3 month follow-up visit but should there be an indication for additional radiation to the right lung following the PET-CT she can certainly be referred back to our department sooner  Problem List Items Addressed This Visit        Respiratory    RESOLVED: Malignant neoplasm of unspecified part of right bronchus or lung ([facility] [address] ) - Primary                Reason for visit is No chief complaint on file  Encounter provider Yudi Mayberry MD    Provider located at [facility] [id number]    Recent Visits  No visits were found meeting these conditions  Showing recent visits within past 7 days and meeting all other requirements  Today's Visits  Date Type Provider Dept   06/16/21 Telemedicine Yudi Mayberry MD [facility]   Showing today's visits and meeting all other requirements  Future Appointments  No visits were found meeting these conditions  Showing future appointments within next 150 days and meeting all other requirements       After connecting through telephone, the patient was identified by name and date of birth  Alton Olmstead was informed that this is a telemedicine visit and that the visit is being conducted through telephone  My office door was closed   No one else was in the room  She acknowledged consent and understanding of privacy and security of the platform  The patient has agreed to participate and understands she can discontinue the visit at any time  Patient is aware this is a billable service  Subjective    Yariel today for routine follow-up approximately 1 and half months status post completion of definitive stereotactic radiation for 2 adjacent left upper lobe lesions  Overall she feels well  She does have an occasional dry cough but denies any shortness of breath or dyspnea on exertion  She is essentially without complaints  She did have a repeat  CT of the chest performed approximately 1 month status post completion of treatment  The recently treated lesions in the left upper lobe appeared grossly stable, which is not surprising given the histology and short interval from radiation  There has been slow progression of a consolidation in the base of the right lung concerning for another site of disease, currently measuring nearly 3 cm  She has been scheduled for a repeat PET-CT next month and will follow-up with Medical Oncology shortly thereafter  She remains on break from all systemic therapy  HPI   Oncology History Overview Note   78-year-old female with a history of stage IV non-small cell lung cancer diagnosed initially in 2016, with a rather indolent disease course, currently with minimal systemic disease burden and on break from systemic therapy  She was referred to our department for consideration of lung stereotactic radiation  Her most recent imaging revealed a dominant left upper lobe lesion which had been very slowly increasing in size over the past few years with an SUV of 4 7, currently measuring 2 7 cm  This lesion had been present ever since her initial diagnosis     Adjacent and lateral to that lesion, there was a 2nd lesion ground-glass in appearance, also slowly enlarging over the past few years with minimal hypermetabolic activity likely representing a distinct synchronous low-grade neoplasm  This lesion measured 3 mm in 2018 and currently measures 1 3 cm  She has now undergone definitive stereotactic radiation to both lesions, with treatment delivered concurrently via a single isocenter plan, completing 8 fractions to left lung on 4/21/21 6/9/21 CT chest wo contrast  Continued enlargement of the right lower lobe paraspinal masslike opacity since March 2021 and February 2021  This could be malignant but the rapid rate of growth suggests an infectious/inflammatory process  Mild new peripheral groundglass opacity in the left upper lobe, likely inflammatory  Stable left upper lobe tumor, adjacent left upper lobe nodule, and left lower lobe nodule since March 2021  Redemonstration of large cystic lesion in the mid body of the pancreas  6/14/21 Dr Keren Martinez follow-up  CT scan in June 2021 of the chest showed right lower lobe lung nodule increasing in size, it might be inflammation versus progressive of disease, will do PET scan in 1 month and will proceed from there    7/14/21 PET/CT  7/19/21 Dr Keren Martinez follow-up  10/1/21 Palliative care follow-up     Adenocarcinoma of lung, stage 4 ([address] )   10/18/2016 Initial Diagnosis    Adenocarcinoma of lung, stage 4 ([address] )     10/18/2016 Biopsy    Final Diagnosis  A  Bone, T10, biopsy:     - Non-small cell carcinoma with features suggesting adenocarcinoma; see note           11/7/2016 - 12/1/2016 Radiation    Course 1: T9 - T11 SPINE  13 fractions   Total Dose = 3,250 cGy     11/17/2016 Biopsy    Final Diagnosis  Lymph Node, Level 7: Conclusive evidence of Malignancy  Poorly differentiated non-small cell carcinoma      Lung, right main stem bronchus, biopsy:              - Poorly differentiated adenocarcinoma       12/15/2016 - 6/1/2017 Chemotherapy    Pembrolizumab 200 mg IV flat dose every 3 weeks      9/13/2017 Biopsy    Bone, left scapula, biopsy:  -  Positive for malignancy, consistent with metastatic adenocarcinoma          10/3/2017 - 10/16/2017 Radiation    palliative course of radiation therapy to the left scapular lesion to 3000 cGy( metastatic from adenocarcinoma of the lung)          4/10/2018 - 10/9/2018 Chemotherapy    nivolumab with prednisone 10 mg p o     11/6/2018 -  Chemotherapy    11/6/18   Alimta 500 milligram/meter squared, carboplatin AUC 5 every 3 weeks,     11/6/2018 - 3/1/2021 Chemotherapy    cyanocobalamin injection 1,000 mcg, 1,000 mcg, Intramuscular, Once, 6 of 10 cycles  Administration: 1,000 mcg (1/17/2020), 1,000 mcg (6/10/2020), 1,000 mcg (10/14/2020), 1,000 mcg (1/5/2021)  fosaprepitant (EMEND) 150 mg in sodium chloride 0 9 % 250 mL IVPB, 150 mg, Intravenous, Once, 1 of 6 cycles  Administration: 150 mg (2/2/2021)  PEMEtrexed (ALIMTA) 1,020 mg in sodium chloride 0 9 % 100 mL chemo infusion, 500 mg/m2 = 1,020 mg, Intravenous, Once, 32 of 37 cycles  Dose modification: 400 mg/m2 (original dose 500 mg/m2, Cycle 23, Reason: Other (See Comments), Comment: decreasing crcl), 300 mg/m2 (original dose 500 mg/m2, Cycle 26, Reason: Treatment Parameters Not Met), 300 mg/m2 (original dose 500 mg/m2, Cycle 30, Reason: Max Dose Reached)  Administration: 1,020 mg (5/24/2019), 1,000 mg (6/14/2019), 1,000 mg (7/5/2019), 1,000 mg (8/2/2019), 1,000 mg (8/23/2019), 1,000 mg (9/13/2019), 1,000 mg (10/4/2019), 1,000 mg (10/25/2019), 1,000 mg (11/15/2019), 1,000 mg (12/6/2019), 1,000 mg (12/27/2019), 1,000 mg (1/17/2020), 1,000 mg (2/7/2020), 800 mg (5/22/2020), 800 mg (6/10/2020), 800 mg (7/1/2020), 600 mg (7/29/2020), 800 mg (8/19/2020), 600 mg (11/11/2020), 600 mg (1/5/2021), 600 mg (2/2/2021), 600 mg (10/14/2020)     11/12/2018 - 11/26/2018 Radiation    Course: C3: Left Scapula retreat  10 fractions  2,000 cGy         4/5/2021 - 4/21/2021 Radiation    Course: C4 SBRT    Plan ID Energy Fractions Dose per Fraction (cGy) Dose Correction (cGy) Total Dose Delivered (cGy) Elapsed Days   SBRT LtLung 6X 8 / 8 750 0 5,80 17      Dr Vanna Haider     Malignant neoplasm of unspecified part of right bronchus or lung ([address] ) (Resolved)   5/24/2017 Initial Diagnosis    Malignant neoplasm of unspecified part of right bronchus or lung ([address] ) (Resolved)     4/5/2021 - 4/21/2021 Radiation    Course: C4 SBRT    Plan ID Energy Fractions Dose per Fraction (cGy) Dose Correction (cGy) Total Dose Delivered (cGy) Elapsed Days   SBRT LtLung 6X 8 / 8 750 0 6,000 12      Dr Vanna Haider     Malignant neoplasm of bone with metastases ([address] )   5/24/2017 Initial Diagnosis    Malignant neoplasm of bone with metastases ([address] )     4/5/2021 - 4/21/2021 Radiation    Course: C4 SBRT    Plan ID Energy Fractions Dose per Fraction (cGy) Dose Correction (cGy) Total Dose Delivered (cGy) Elapsed Days   SBRT LtLung 6X 8 / 8 750 0 6,36 16      Dr Vanna Haider           Past Medical History:   Diagnosis Date    Arthritis     Atrial fibrillation ([address] )     Diabetes mellitus ([address] )     Diabetes mellitus type 2, uncomplicated ([address] )     Last assessed: 8/17/17    Essential hypertension     Frozen shoulder     L shoulder    GERD (gastroesophageal reflux disease)     Hx of cancer of uterus     Last assessed: 8/21/15    Hyperlipidemia     Hyponatremia 11/16/2016    Lung mass     diagnosed 9/2016    Malignant neoplasm without specification of site ([address] )     Skin cancer, basal cell     right eye area    Stage 4 lung cancer ([address] )        Past Surgical History:   Procedure Laterality Date    APPENDECTOMY      BRONCHOSCOPY N/A 11/17/2016    Procedure: BRONCHOSCOPY FLEXIBLE;  Surgeon: Arielle Benoit MD;  Location: [facility];  Service:    Odin Naranjo CHOLECYSTECTOMY      COLONOSCOPY      GALLBLADDER SURGERY      HYSTERECTOMY  2000    Total abdominal    LARYNGOSCOPY      Flexible Fiberoptic, (Therapeutic), Resolved: 11/17/16    MOHS SURGERY      Micrographic Surgery Face    MO BRONCHOSCOPY NEEDLE BX TRACHEA MAIN STEM&/BRON N/A 11/17/2016    Procedure: EBUS; FROZEN SECTION ;  Surgeon: Patti Shin MD;  Location: [facility];  Service: Thoracic    [address] [address] N/A 5/24/2017    Procedure: Lenward Abts;  Surgeon: Ricki Bustillos MD;  Location: [facility]; Service: Pulmonary    TONSILECTOMY AND ADNOIDECTOMY      TOTAL KNEE ARTHROPLASTY Right 09/23/2014       Current Outpatient Medications   Medication Sig Dispense Refill    acetaminophen (TYLENOL) 325 mg tablet Take 650 mg by mouth every 6 (six) hours as needed for mild pain      apixaban (Eliquis) 5 mg TAKE 1 TABLET TWICE A  tablet 3    Cholecalciferol (VITAMIN D) 2000 units CAPS Take by mouth      cyanocobalamin (VITAMIN B-12) 100 mcg tablet Take by mouth daily      Folic Acid 0 8 MG CAPS daily       furosemide (LASIX) 40 mg tablet Take 1 tablet (40 mg total) by mouth as needed (leg swelling, fluid overload)      Iron, Ferrous Sulfate, 325 (65 Fe) MG TABS Take 1 tablet by mouth daily      linaGLIPtin (Tradjenta) 5 MG TABS Take 5 mg by mouth daily 90 tablet 3    losartan (COZAAR) 50 mg tablet Take 1 tablet (50 mg total) by mouth daily 90 tablet 3    metFORMIN (GLUCOPHAGE) 1000 MG tablet Take 1 tablet (1,000 mg total) by mouth 2 (two) times a day with meals 180 tablet 3    metoprolol tartrate (LOPRESSOR) 25 mg tablet Take 1 tablet (25 mg total) by mouth every 12 (twelve) hours 180 tablet 3    omeprazole (PriLOSEC) 20 mg delayed release capsule Take 1 capsule (20 mg total) by mouth daily 90 capsule 3    potassium chloride (K-DUR,KLOR-CON) 20 mEq tablet Take 2 tablets (40 mEq total) by mouth daily as needed (low K+) (Patient not taking: Reported on 6/14/2021)      sotalol (BETAPACE) 80 mg tablet Take 1 tablet (80 mg total) by mouth 2 (two) times a day 1 tab PO  tablet 3    venlafaxine (EFFEXOR) 37 5 mg tablet TAKE 1 TABLET DAILY 90 tablet 3     No current facility-administered medications for this visit          Allergies Allergen Reactions    Amoxicillin Rash and Hives    Cardizem [Diltiazem] Rash     Rash      Statins Myalgia     Severe muscle aching  Terrible pains           VIRTUAL VISIT DISCLAIMER    Annelise Burnett acknowledges that she has consented to an online visit or consultation  She understands that the online visit is based solely on information provided by her, and that, in the absence of a face-to-face physical evaluation by the physician, the diagnosis she receives is both limited and provisional in terms of accuracy and completeness  This is not intended to replace a full medical face-to-face evaluation by the physician  Annelise Burnett understands and accepts these terms

## 2021-07-09 ENCOUNTER — PATIENT OUTREACH (OUTPATIENT)
Dept: HEMATOLOGY ONCOLOGY | Facility: CLINIC | Age: 71
End: 2021-07-09

## 2021-07-09 NOTE — PROGRESS NOTES
Returned patient's call  Patient inquired if she needs blood work done prior to her follow-up with Seble Dewitt on 7/19/2021  Informed patient orders for CBC and CMP were placed and she should have them done prior to her visit  She also questioned if her MRI of the abdomen scheduled for 4/18/2022 should be done with contrast   Her previous MRI's have been done without contrast   Informed patient I would reach out to Dr Reynaldo Givens to clarify the order  Patient was appreciative

## 2021-07-14 ENCOUNTER — HOSPITAL ENCOUNTER (OUTPATIENT)
Dept: RADIOLOGY | Age: 71
Discharge: HOME | End: 2021-07-14
Payer: MEDICARE

## 2021-07-14 DIAGNOSIS — C34.91 MALIGNANT NEOPLASM OF UNSPECIFIED PART OF RIGHT BRONCHUS OR LUNG (HCC): ICD-10-CM

## 2021-07-14 DIAGNOSIS — C41.9 MALIGNANT NEOPLASM OF BONE WITH METASTASES (HCC): ICD-10-CM

## 2021-07-14 LAB — GLUCOSE SERPL-MCNC: 134 MG/DL (ref 65–140)

## 2021-07-14 PROCEDURE — 78815 PET IMAGE W/CT SKULL-THIGH: CPT

## 2021-07-14 PROCEDURE — G1004 CDSM NDSC: HCPCS

## 2021-07-14 PROCEDURE — A9552 F18 FDG: HCPCS

## 2021-07-14 PROCEDURE — 82948 REAGENT STRIP/BLOOD GLUCOSE: CPT

## 2021-07-16 ENCOUNTER — APPOINTMENT (OUTPATIENT)
Dept: LAB | Facility: AMBULARY SURGERY CENTER | Age: 71
End: 2021-07-16
Payer: MEDICARE

## 2021-07-16 DIAGNOSIS — C34.91 MALIGNANT NEOPLASM OF UNSPECIFIED PART OF RIGHT BRONCHUS OR LUNG (HCC): ICD-10-CM

## 2021-07-16 LAB
ALBUMIN SERPL BCP-MCNC: 2.9 G/DL (ref 3.5–5)
ALP SERPL-CCNC: 93 U/L (ref 46–116)
ALT SERPL W P-5'-P-CCNC: 19 U/L (ref 12–78)
ANION GAP SERPL CALCULATED.3IONS-SCNC: 8 MMOL/L (ref 4–13)
AST SERPL W P-5'-P-CCNC: 24 U/L (ref 5–45)
BASOPHILS # BLD AUTO: 0.04 THOUSANDS/ΜL (ref 0–0.1)
BASOPHILS NFR BLD AUTO: 1 % (ref 0–1)
BILIRUB SERPL-MCNC: 0.46 MG/DL (ref 0.2–1)
BUN SERPL-MCNC: 30 MG/DL (ref 5–25)
CALCIUM ALBUM COR SERPL-MCNC: 10.9 MG/DL (ref 8.3–10.1)
CALCIUM SERPL-MCNC: 10 MG/DL (ref 8.3–10.1)
CHLORIDE SERPL-SCNC: 108 MMOL/L (ref 100–108)
CO2 SERPL-SCNC: 23 MMOL/L (ref 21–32)
CREAT SERPL-MCNC: 1.78 MG/DL (ref 0.6–1.3)
EOSINOPHIL # BLD AUTO: 0.34 THOUSAND/ΜL (ref 0–0.61)
EOSINOPHIL NFR BLD AUTO: 7 % (ref 0–6)
ERYTHROCYTE [DISTWIDTH] IN BLOOD BY AUTOMATED COUNT: 13.2 % (ref 11.6–15.1)
GFR SERPL CREATININE-BSD FRML MDRD: 28 ML/MIN/1.73SQ M
GLUCOSE P FAST SERPL-MCNC: 138 MG/DL (ref 65–99)
HCT VFR BLD AUTO: 36.3 % (ref 34.8–46.1)
HGB BLD-MCNC: 11.4 G/DL (ref 11.5–15.4)
IMM GRANULOCYTES # BLD AUTO: 0.02 THOUSAND/UL (ref 0–0.2)
IMM GRANULOCYTES NFR BLD AUTO: 0 % (ref 0–2)
LYMPHOCYTES # BLD AUTO: 0.68 THOUSANDS/ΜL (ref 0.6–4.47)
LYMPHOCYTES NFR BLD AUTO: 13 % (ref 14–44)
MCH RBC QN AUTO: 27.7 PG (ref 26.8–34.3)
MCHC RBC AUTO-ENTMCNC: 31.4 G/DL (ref 31.4–37.4)
MCV RBC AUTO: 88 FL (ref 82–98)
MONOCYTES # BLD AUTO: 0.41 THOUSAND/ΜL (ref 0.17–1.22)
MONOCYTES NFR BLD AUTO: 8 % (ref 4–12)
NEUTROPHILS # BLD AUTO: 3.63 THOUSANDS/ΜL (ref 1.85–7.62)
NEUTS SEG NFR BLD AUTO: 71 % (ref 43–75)
NRBC BLD AUTO-RTO: 0 /100 WBCS
PLATELET # BLD AUTO: 203 THOUSANDS/UL (ref 149–390)
PMV BLD AUTO: 11.4 FL (ref 8.9–12.7)
POTASSIUM SERPL-SCNC: 4.4 MMOL/L (ref 3.5–5.3)
PROT SERPL-MCNC: 6.8 G/DL (ref 6.4–8.2)
RBC # BLD AUTO: 4.12 MILLION/UL (ref 3.81–5.12)
SODIUM SERPL-SCNC: 139 MMOL/L (ref 136–145)
WBC # BLD AUTO: 5.12 THOUSAND/UL (ref 4.31–10.16)

## 2021-07-16 PROCEDURE — 36415 COLL VENOUS BLD VENIPUNCTURE: CPT

## 2021-07-16 PROCEDURE — 80053 COMPREHEN METABOLIC PANEL: CPT

## 2021-07-16 PROCEDURE — 85025 COMPLETE CBC W/AUTO DIFF WBC: CPT

## 2021-07-19 ENCOUNTER — HOSPITAL ENCOUNTER (OUTPATIENT)
Dept: INFUSION CENTER | Facility: CLINIC | Age: 71
Discharge: HOME | End: 2021-07-19
Payer: MEDICARE

## 2021-07-19 ENCOUNTER — OFFICE VISIT (OUTPATIENT)
Dept: HEMATOLOGY ONCOLOGY | Facility: CLINIC | Age: 71
End: 2021-07-19
Payer: MEDICARE

## 2021-07-19 ENCOUNTER — APPOINTMENT (OUTPATIENT)
Dept: LAB | Facility: CLINIC | Age: 71
End: 2021-07-19
Payer: MEDICARE

## 2021-07-19 ENCOUNTER — TELEPHONE (OUTPATIENT)
Dept: HEMATOLOGY ONCOLOGY | Facility: CLINIC | Age: 71
End: 2021-07-19

## 2021-07-19 VITALS
RESPIRATION RATE: 18 BRPM | WEIGHT: 202 LBS | HEIGHT: 64 IN | SYSTOLIC BLOOD PRESSURE: 132 MMHG | DIASTOLIC BLOOD PRESSURE: 70 MMHG | HEART RATE: 70 BPM | OXYGEN SATURATION: 96 % | TEMPERATURE: 97.2 F | BODY MASS INDEX: 34.49 KG/M2

## 2021-07-19 DIAGNOSIS — C34.92 SQUAMOUS CARCINOMA OF LUNG, LEFT (HCC): ICD-10-CM

## 2021-07-19 DIAGNOSIS — C34.92 ADENOCARCINOMA OF LEFT LUNG, STAGE 4 (HCC): Primary | ICD-10-CM

## 2021-07-19 DIAGNOSIS — C34.91 MALIGNANT NEOPLASM OF UNSPECIFIED PART OF RIGHT BRONCHUS OR LUNG (HCC): ICD-10-CM

## 2021-07-19 PROCEDURE — 96372 THER/PROPH/DIAG INJ SC/IM: CPT

## 2021-07-19 PROCEDURE — 36415 COLL VENOUS BLD VENIPUNCTURE: CPT

## 2021-07-19 PROCEDURE — 99215 OFFICE O/P EST HI 40 MIN: CPT | Performed by: INTERNAL MEDICINE

## 2021-07-19 RX ORDER — DEXAMETHASONE 4 MG/1
TABLET ORAL
Qty: 30 TABLET | Refills: 1 | Status: CANCELLED | OUTPATIENT
Start: 2021-07-19

## 2021-07-19 RX ORDER — CYANOCOBALAMIN 1000 UG/ML
1000 INJECTION INTRAMUSCULAR; SUBCUTANEOUS ONCE
Status: COMPLETED | OUTPATIENT
Start: 2021-07-19 | End: 2021-07-19

## 2021-07-19 RX ORDER — CYANOCOBALAMIN 1000 UG/ML
1000 INJECTION INTRAMUSCULAR; SUBCUTANEOUS ONCE
Status: CANCELLED | OUTPATIENT
Start: 2021-07-19 | End: 2021-07-26

## 2021-07-19 RX ORDER — FOLIC ACID 1 MG/1
1 TABLET ORAL DAILY
Qty: 90 TABLET | Refills: 1 | Status: SHIPPED | OUTPATIENT
Start: 2021-07-19

## 2021-07-19 RX ORDER — SODIUM CHLORIDE 9 MG/ML
20 INJECTION, SOLUTION INTRAVENOUS ONCE
Status: CANCELLED | OUTPATIENT
Start: 2021-08-02

## 2021-07-19 RX ADMIN — CYANOCOBALAMIN 1000 MCG: 1000 INJECTION, SOLUTION INTRAMUSCULAR; SUBCUTANEOUS at 15:07

## 2021-07-19 NOTE — TELEPHONE ENCOUNTER
Patient calling to advise the [facility] address that was given is not correct nor is the phone number provided  She stated the back of her RX card for [facility] is     [address], [address] [facility]     Provider help number [phone]      She can be reached at [phone]

## 2021-07-19 NOTE — PROGRESS NOTES
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 of the lung with left scapula involvement diagnosed on 08/2016 PDL expression more than 50%, negative for EGFR mutation, ALK  rearrangement, Ros1 mutation     Treated initially with Pembrolizumab complicated with pneumonitis and later on nivolumab with prednisone 10 mg p o   Daily with excellent response      Disease progression in August 2018 in the left scapula with pain no new lesions by PET scan   Status post radiation therapy to the left scapula and treated with Alimta 500 milligram/meter squared, carboplatin AUC 5   After 3 cycles,  CT scan in January 2019 showed stable disease, currently on maintenance Alimta 500 milligram/meter squared every 3 weeks           CT scan 4/2020 showed stable disease in the left upper lobe of the lung , no evidence of new lesions     Alimta dose was reduced to 400 milligram/meter IV as creatinine came down to 1 28  Subsequent CT scan showed stable disease on 11/2020, no new lesions     Pemetrexed dose was reduced to 300 milligram/meter squared every 4 weeks   Cycle #33 due 2/2/21      CT scan of the chest on 02/22/2021 showed stable left lobe lung nodules, status post SB RT to the left upper lobe lung nodule we hold pemetrexed because of chronic kidney disease      CT scan in June 2021 of the chest showed right lower lobe lung nodule johnie increasing in size it might be inflammation versus progressive of disease, PET scan in July 2021 showed enlarging right lower lobe lung mass, increased activity of the left upper lobe consistent disease, will start the patient on nivolumab 240 mg flat dose every 3 weeks, pemetrexed 250 milligram/meter squared, carboplatin AUC 5 every 3 weeks and would do imaging studies after 3 cycles to assess response and possible SBRT to the right lower lobe lung     will send for liquid biopsy      Labs and imaging studies are reviewed by ordering provider once results are available  If there are findings that need immediate attention, you will be contacted when results available  Discussing results and the implication on your healthcare is best discussed in person at your follow-up visit  HPI:    Marcell Casillas was admitted to the hospital with arrhythmia and was found to have right lower lobe infiltrate in August 2016   She wasreated with antibiotics however repeat chest x-ray showed persistent right lower lobe infiltrate  Subsequently the patient had a CT scan of the chest which showed a right perihilar mass, subcarinal lymphadenopathy, lytic lesion of the right costovertebral junction at T10 level   PET scan October 2016 showed a right perihilar mass measuring 3 5 cm with SUV of 8 9, subcarinal lymph nodes measuring 3 4 x 2 2 cm with SUV of 9 2  Nodule in the left upper lobe lung measured 2 x 1 1 cm   There was a lytic lesion involving the right 10th costovertebral junction with SUV of 14  4      Biopsy showed non-small cell carcinoma with features suggesting of adenocarcinoma positive for CK 7, CK 19, CA-19-9, ANEUDY-3, partially positive for P40, p63, negative for TTF-1   She had a history of uterine cancer in 2000 status post hysterectomy and did not require radiation or chemotherapy   She is status post bilateral oophorectomy, right knee replacement,   tonsillectomy       She used to smoke for 35 years 1 pack per day however quit smoking 21 years ago   She used hormonal replacement therapy for 3 years  Anirudh Skinner has a family history significant for skin cancer in her father and coronary artery disease in mother  Anirudh Skinner has 2 healthy children      Treated initially with Pembrolizumab December 2016, Finished in May 2017 secondary to grade 3 pneumonitis   Initiated on prednisone     Progression: Nivolumab 240 mg flat dose every 2 weeks along with prednisone 10 mg p o  Daily initiated April 2018- October 2018 with excellent response      Liquid biopsy showed K-MADHURI mutation G12C, no evidence of MSI high        Disease progression in August 2018 in the left scapula with pain no new lesions by PET scan   Status post radiation therapy to the left scapula and treated with Alimta 500 milligram/meter squared, carboplatin AUC 5   After 3 cycles,  CT scan in January 2019 showed stable disease  Carbo discontinued 3/2019    Maintenance Alimta 500 milligram/meter squared every 3 weeks initiated 3/2019        Cr 2/20 was 1 5   Plan was to dose reduce Alimta to 400mg/m2; however cr 2 16 2/24/20 and Alimta was held       3/4/20:  renal u/s  Minimal fullness of the left renal collecting system without matthieu hydronephrosis     Chronic right kidney lower pole cortical scar, with adjacent parenchymal calcification measuring 5 mm       She was on prednisone 5 mg p o  daily because of previous history of pneumonitis  CT scan chest 1/3/2020 showed no evidence of infiltration in the lung parenchyma, prednisone was reduced every other day for 1 month and then discontinued        CT scan 4/2020 showed stable disease in the left upper lobe of the lung     CT scan on 07/2020 showed stable disease in the left upper lobe of the lung, pancreatic cyst 2 3 cm, stable from the previous CT scan however bigger from last year CT scan       Chronic LLE edema since fall she had 2/2020    Venous doppler negative for clot      CT scan in February 2021 showed stable left upper lobe lung nodule however with progressive chronic kidney disease we decided to hold pemetrexed, status post SBRT to the left upper lobe lung nodule     CT scan of the chest in June 2021 showed increase in the size of the right lower lobe lung mass may be progression of disease versus inflammation/ infection    PET scan on 07/14/2021 showed large consolidation in the left lobe might be metastatic disease versus inflammation, uptake in enlarging right lower lobe lung lesion suspicious for malignancy, no evidence of disease in the neck abdomen or pelvis, mild disease in the left scapula       Interval History:        Previous Treatment:         Test Results:    Imaging: NM PET CT skull base to mid thigh    Result Date: 7/15/2021  Narrative: PET/CT SCAN INDICATION: History of metastatic lung cancer  Additional history of endometrial cancer in 2000  C34 91: Malignant neoplasm of unspecified part of right bronchus or lung C41 9: 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, unspecified MODIFIER: PS COMPARISON: PET CT 3/12/2021, CT chest 6/9/2021 CELL TYPE:  Non-small cell carcinoma consistent with adenocarcinoma, biopsy T10 in 2016 TECHNIQUE:   9 6 mCi F-18-FDG administered IV  Multiplanar attenuation corrected and non attenuation corrected PET images were acquired 60 minutes post injection  Contiguous, low dose, axial CT sections were obtained from the skull base through the femurs   Intravenous contrast material was not utilized  This examination, like all CT scans performed in the [facility], was performed utilizing techniques to minimize radiation dose exposure, including the use of iterative reconstruction and automated exposure control  Fasting serum glucose: 134 mg/dl FINDINGS: VISUALIZED BRAIN:   No acute abnormalities are seen  HEAD/NECK:   There is a physiologic distribution of FDG  No FDG avid cervical adenopathy is seen  CT images: Partial opacification of the left ethmoid air cells  CHEST:   Prominent FDG avid masslike consolidation in the left upper lobe, SUV max of 8 2, prior SUV max of 4 7  This measures approximately 7 5 x 5 0 cm image 79 series 3  There were previously several discrete lesions in this region on most recent chest CT  Region appears more bandlike on the sagittal reformats   New FDG avid pulmonary density in the left lower lobe superior segment adjacent to this region, SUV max of 4 4   This measures 2 5 x 2 1 cm image 83 series 3  Right lower lobe pleural based lesion demonstrates SUV max of 7 9, prior SUV max of 4 8  This measures 3 7 x 2 4 cm image 110 series 3, prior measurements of 2 8 x 1 3 cm on prior PET/CT  Stable 8 mm pleural-based nodular density in the left lower lobe anteriorly image 108 series 3, again not FDG avid  CT images: Scattered coronary artery calcifications    ABDOMEN:   No FDG avid soft tissue lesions are seen  Cystic lesion at the pancreatic body measures 2 9 x 2 8 cm, prior measurements of 3 3 x 2 7 cm, probably not a significant change given slight differences in orientation  CT images: Stable small left adrenal nodule, not FDG avid  Prior cholecystectomy  Focal cortical scarring at the right kidney midpole with associated calcification  Colonic diverticulosis  Surgical clips along the iliac regions bilaterally  PELVIS: No FDG avid soft tissue lesions are seen  CT images: Status post hysterectomy  OSSEOUS STRUCTURES: Mild radiotracer uptake at the body of the left scapula and adjacent soft tissues, SUV max of 2 5, previously 2 4, not a significant change  Findings suggest treated metastasis  No new FDG avid osseous lesions  Scattered mild patchy foci of radiotracer uptake in the spine corresponding to degenerative changes on CT  CT images: Multilevel degenerative spurring of the spine  Impression: 1  There is now a large FDG avid masslike consolidation in the left upper lobe  This is in the region of the previously seen FDG avid lesion  Adjacent patchy focus of FDG uptake in the left lower lobe superior segment  On reformats, overall, this has  a bandlike configuration and would suggest post treatment changes but underlying malignancy is not excluded  2   Increased FDG uptake in the enlarging right lower lobe lesion suspicious for malignancy  3   No findings for hypermetabolic metastasis to the neck, abdomen or pelvis   4  Mild persistent FDG uptake at the level of the left scapula and adjacent soft tissues likely related to treated disease  No new findings suspicious for osseous metastasis  The study was marked in EPIC for significant notification  Workstation performed: [id number]       Labs:   Lab Results   Component Value Date    WBC 5 12 07/16/2021    HGB 11 4 (L) 07/16/2021    HCT 36 3 07/16/2021    MCV 88 07/16/2021     07/16/2021     Lab Results   Component Value Date     11/23/2015    K 4 4 07/16/2021     07/16/2021    CO2 23 07/16/2021    ANIONGAP 9 11/23/2015    BUN 30 (H) 07/16/2021    CREATININE 1 78 (H) 07/16/2021    GLUCOSE 149 (H) 11/23/2015    GLUF 138 (H) 07/16/2021    CALCIUM 10 0 07/16/2021    CORRECTEDCA 10 9 (H) 07/16/2021    AST 24 07/16/2021    ALT 19 07/16/2021    ALKPHOS 93 07/16/2021    PROT 6 8 11/23/2015    BILITOT 0 65 11/23/2015    EGFR 28 07/16/2021       Lab Results   Component Value Date    IRON 40 (L) 04/01/2021    TIBC 302 04/01/2021    FERRITIN 53 04/01/2021       No results found for: [id number]      ROS: Review of Systems   Constitutional: Negative for appetite change, chills, diaphoresis, fatigue and unexpected weight change  HENT:   Negative for mouth sores, nosebleeds, sore throat, trouble swallowing and voice change  Eyes: Negative for eye problems and icterus  Respiratory: Negative for chest tightness, cough, hemoptysis and wheezing  Cardiovascular: Negative for chest pain, leg swelling and palpitations  Gastrointestinal: Negative for abdominal distention, abdominal pain, blood in stool, constipation, diarrhea, nausea and vomiting  Endocrine: Negative for hot flashes  Genitourinary: Negative for bladder incontinence, difficulty urinating, dyspareunia, dysuria and frequency  Musculoskeletal: Negative for arthralgias, back pain, gait problem, neck pain and neck stiffness  Skin: Negative for itching and rash     Neurological: Negative for dizziness, gait problem, headaches, numbness, seizures and speech difficulty  Hematological: Negative for adenopathy  Does not bruise/bleed easily  Psychiatric/Behavioral: Negative for decreased concentration, depression, sleep disturbance and suicidal ideas  The patient is not nervous/anxious  Current Medications: Reviewed  Allergies: Reviewed  PMH/FH/SH:  Reviewed      Physical Exam:    Body surface area is 1 96 meters squared  Wt Readings from Last 3 Encounters:   07/19/21 91 6 kg (202 lb)   06/14/21 90 7 kg (200 lb)   04/19/21 89 2 kg (196 lb 9 6 oz)        Temp Readings from Last 3 Encounters:   07/19/21 (!) 97 2 °F (36 2 °C) (Temporal)   06/14/21 97 5 °F (36 4 °C) (Temporal)   04/19/21 97 5 °F (36 4 °C) (Tympanic)        BP Readings from Last 3 Encounters:   07/19/21 132/70   06/14/21 132/74   04/19/21 130/62         Pulse Readings from Last 3 Encounters:   07/19/21 70   06/14/21 61   04/19/21 66        Physical Exam  Vitals reviewed  Constitutional:       General: She is not in acute distress  Appearance: She is well-developed  She is not diaphoretic  HENT:      Head: Normocephalic and atraumatic  Eyes:      Conjunctiva/sclera: Conjunctivae normal    Neck:      Trachea: No tracheal deviation  Cardiovascular:      Rate and Rhythm: Normal rate and regular rhythm  Heart sounds: No murmur heard  No friction rub  No gallop  Pulmonary:      Effort: Pulmonary effort is normal  No respiratory distress  Breath sounds: Normal breath sounds  No wheezing or rales  Chest:      Chest wall: No tenderness  Abdominal:      General: There is no distension  Palpations: Abdomen is soft  Tenderness: There is no abdominal tenderness  Musculoskeletal:      Cervical back: Normal range of motion and neck supple  Right lower leg: No edema  Left lower leg: No edema  Lymphadenopathy:      Cervical: No cervical adenopathy  Skin:     General: Skin is warm and dry  Coloration: Skin is not pale  Findings: No erythema  Neurological:      Mental Status: She is alert and oriented to person, place, and time  Psychiatric:         Behavior: Behavior normal          Thought Content: Thought content normal          Judgment: Judgment normal          ECO    Goals and Barriers:  Current Goal: Minimize effects of disease  Barriers: None  Patient's Capacity to Self Care:  Patient is able to self care      Code Status: [unfilled]

## 2021-07-19 NOTE — TELEPHONE ENCOUNTER
Confirmed with pt [facility] most recent scripts were sent to is correct  This address differs from the one pt provided, but appears to be correct  Pt agreed okay to send folic acid to pharmacy we have on file  She has Decadron at home and will let us know when she is getting low

## 2021-07-19 NOTE — PROGRESS NOTES
Pt here for vitamin b12 injection prior to starting chemotherapy  Pt offers no complaints  B12 given in R arm without any issues  Pt aware of next appointment 8/2   Declines AVS

## 2021-07-21 LAB — MISCELLANEOUS LAB TEST RESULT: NORMAL

## 2021-07-28 ENCOUNTER — TELEPHONE (OUTPATIENT)
Dept: NEPHROLOGY | Facility: CLINIC | Age: 71
End: 2021-07-28

## 2021-07-30 ENCOUNTER — TELEPHONE (OUTPATIENT)
Dept: HEMATOLOGY ONCOLOGY | Facility: CLINIC | Age: 71
End: 2021-07-30

## 2021-07-30 NOTE — TELEPHONE ENCOUNTER
Ethan Hoffman calling to see if final report has been received   They faxed the report on 7/27/21  I explained that I do not see the report scanned in the chart yet - they will re fax again  Sending to [facility]

## 2021-07-31 ENCOUNTER — APPOINTMENT (OUTPATIENT)
Dept: LAB | Facility: CLINIC | Age: 71
End: 2021-07-31
Payer: MEDICARE

## 2021-07-31 DIAGNOSIS — C34.92 ADENOCARCINOMA OF LEFT LUNG, STAGE 4 (HCC): ICD-10-CM

## 2021-07-31 LAB
ALBUMIN SERPL BCP-MCNC: 3.1 G/DL (ref 3.5–5)
ALP SERPL-CCNC: 105 U/L (ref 46–116)
ALT SERPL W P-5'-P-CCNC: 16 U/L (ref 12–78)
ANION GAP SERPL CALCULATED.3IONS-SCNC: 9 MMOL/L (ref 4–13)
AST SERPL W P-5'-P-CCNC: 16 U/L (ref 5–45)
BASOPHILS # BLD AUTO: 0.04 THOUSANDS/ΜL (ref 0–0.1)
BASOPHILS NFR BLD AUTO: 1 % (ref 0–1)
BILIRUB SERPL-MCNC: 0.35 MG/DL (ref 0.2–1)
BUN SERPL-MCNC: 31 MG/DL (ref 5–25)
CALCIUM ALBUM COR SERPL-MCNC: 10.5 MG/DL (ref 8.3–10.1)
CALCIUM SERPL-MCNC: 9.8 MG/DL (ref 8.3–10.1)
CHLORIDE SERPL-SCNC: 106 MMOL/L (ref 100–108)
CO2 SERPL-SCNC: 26 MMOL/L (ref 21–32)
CREAT SERPL-MCNC: 1.83 MG/DL (ref 0.6–1.3)
EOSINOPHIL # BLD AUTO: 0.21 THOUSAND/ΜL (ref 0–0.61)
EOSINOPHIL NFR BLD AUTO: 3 % (ref 0–6)
ERYTHROCYTE [DISTWIDTH] IN BLOOD BY AUTOMATED COUNT: 13.2 % (ref 11.6–15.1)
GFR SERPL CREATININE-BSD FRML MDRD: 27 ML/MIN/1.73SQ M
GLUCOSE P FAST SERPL-MCNC: 157 MG/DL (ref 65–99)
HCT VFR BLD AUTO: 37.5 % (ref 34.8–46.1)
HGB BLD-MCNC: 11.5 G/DL (ref 11.5–15.4)
IMM GRANULOCYTES # BLD AUTO: 0.04 THOUSAND/UL (ref 0–0.2)
IMM GRANULOCYTES NFR BLD AUTO: 1 % (ref 0–2)
LYMPHOCYTES # BLD AUTO: 0.73 THOUSANDS/ΜL (ref 0.6–4.47)
LYMPHOCYTES NFR BLD AUTO: 10 % (ref 14–44)
MCH RBC QN AUTO: 27.4 PG (ref 26.8–34.3)
MCHC RBC AUTO-ENTMCNC: 30.7 G/DL (ref 31.4–37.4)
MCV RBC AUTO: 90 FL (ref 82–98)
MONOCYTES # BLD AUTO: 0.53 THOUSAND/ΜL (ref 0.17–1.22)
MONOCYTES NFR BLD AUTO: 7 % (ref 4–12)
NEUTROPHILS # BLD AUTO: 6.11 THOUSANDS/ΜL (ref 1.85–7.62)
NEUTS SEG NFR BLD AUTO: 78 % (ref 43–75)
NRBC BLD AUTO-RTO: 0 /100 WBCS
PLATELET # BLD AUTO: 264 THOUSANDS/UL (ref 149–390)
PMV BLD AUTO: 10.7 FL (ref 8.9–12.7)
POTASSIUM SERPL-SCNC: 4.5 MMOL/L (ref 3.5–5.3)
PROT SERPL-MCNC: 6.9 G/DL (ref 6.4–8.2)
RBC # BLD AUTO: 4.19 MILLION/UL (ref 3.81–5.12)
SODIUM SERPL-SCNC: 141 MMOL/L (ref 136–145)
WBC # BLD AUTO: 7.66 THOUSAND/UL (ref 4.31–10.16)

## 2021-07-31 PROCEDURE — 80053 COMPREHEN METABOLIC PANEL: CPT

## 2021-07-31 PROCEDURE — 36415 COLL VENOUS BLD VENIPUNCTURE: CPT

## 2021-07-31 PROCEDURE — 85025 COMPLETE CBC W/AUTO DIFF WBC: CPT

## 2021-08-02 ENCOUNTER — HOSPITAL ENCOUNTER (OUTPATIENT)
Dept: INFUSION CENTER | Facility: CLINIC | Age: 71
Discharge: HOME | End: 2021-08-02
Payer: MEDICARE

## 2021-08-02 ENCOUNTER — TELEPHONE (OUTPATIENT)
Dept: HEMATOLOGY ONCOLOGY | Facility: CLINIC | Age: 71
End: 2021-08-02

## 2021-08-02 VITALS
HEART RATE: 63 BPM | OXYGEN SATURATION: 97 % | HEIGHT: 64 IN | TEMPERATURE: 97.1 F | RESPIRATION RATE: 16 BRPM | WEIGHT: 201.5 LBS | SYSTOLIC BLOOD PRESSURE: 118 MMHG | DIASTOLIC BLOOD PRESSURE: 58 MMHG | BODY MASS INDEX: 34.4 KG/M2

## 2021-08-02 DIAGNOSIS — C34.92 ADENOCARCINOMA OF LEFT LUNG, STAGE 4 (HCC): Primary | ICD-10-CM

## 2021-08-02 PROCEDURE — 96417 CHEMO IV INFUS EACH ADDL SEQ: CPT

## 2021-08-02 PROCEDURE — 96413 CHEMO IV INFUSION 1 HR: CPT

## 2021-08-02 PROCEDURE — 96367 TX/PROPH/DG ADDL SEQ IV INF: CPT

## 2021-08-02 RX ORDER — SODIUM CHLORIDE 9 MG/ML
20 INJECTION, SOLUTION INTRAVENOUS ONCE
Status: COMPLETED | OUTPATIENT
Start: 2021-08-02 | End: 2021-08-02

## 2021-08-02 RX ADMIN — FOSAPREPITANT 150 MG: 150 INJECTION, POWDER, LYOPHILIZED, FOR SOLUTION INTRAVENOUS at 12:51

## 2021-08-02 RX ADMIN — CARBOPLATIN 275 MG: 10 INJECTION, SOLUTION INTRAVENOUS at 14:44

## 2021-08-02 RX ADMIN — SODIUM CHLORIDE 500 MG: 9 INJECTION, SOLUTION INTRAVENOUS at 14:16

## 2021-08-02 RX ADMIN — SODIUM CHLORIDE 20 ML/HR: 0.9 INJECTION, SOLUTION INTRAVENOUS at 12:31

## 2021-08-02 RX ADMIN — SODIUM CHLORIDE 240 MG: 9 INJECTION, SOLUTION INTRAVENOUS at 13:28

## 2021-08-02 RX ADMIN — DEXAMETHASONE SODIUM PHOSPHATE: 10 INJECTION, SOLUTION INTRAMUSCULAR; INTRAVENOUS at 12:29

## 2021-08-02 NOTE — PROGRESS NOTES
Patient tolerated treatment today without incident and was discharged post   She will RTO for her next cycle on 8/23/21 and declines AVS   No c/o offered at time of discharge

## 2021-08-02 NOTE — TELEPHONE ENCOUNTER
Reviewed with Dr Michael Chicas and he stated patient is okay for treatment   Removed parameter for CrCl from chemo order

## 2021-08-02 NOTE — TELEPHONE ENCOUNTER
Call from patient   Most recent creatinine level is 1 83  Patient admits to dehydration over the weekend as she was on vacation and traveling  Patient is resuming normal hydration status    Patient is due for chemotherapy today and would like to receive chemo as she has recurrence    Will send to Dr Pedro Lizarraga RNs

## 2021-08-02 NOTE — PATIENT INSTRUCTIONS
August 2021 Sunday Monday Tuesday Wednesday Thursday Friday Saturday   1     2    INF ONCOLOGY TX-TREATMENT PLAN  12:00 PM   (240 min )   AN INF BED 6   [address] 3     4     5     6     7        Cycle 1, Day 1        8     9     10     11     12     13     14                15     16     17     18     19     20     21                22     23    INF ONCOLOGY TX-TREATMENT PLAN   9:30 AM   (240 min )   AN INF CHAIR [address] 24     25     26     27     28        Cycle 2, Day 1        29     30    FOLLOW UP PG   7:45 AM   (30 min )   Wilma Purcell PA-C   [facility] [place] [address]                                         Treatment Details       8/2/2021 - Cycle 1, Day 1      Chemotherapy: NIVOLUMAB IVPB, ONCBCN PROVIDER COMMUNICATION5, PEMETREXED INFUSION, ONCBCN PROVIDER COMMUNICATION2, CARBOPLATIN IVPB (GOG AUC DOSING)    8/23/2021 - Cycle 2, Day 1      Chemotherapy: NIVOLUMAB IVPB, ONCBCN PROVIDER COMMUNICATION5, PEMETREXED INFUSION, ONCBCN PROVIDER COMMUNICATION2, CARBOPLATIN IVPB (GOG AUC DOSING)

## 2021-08-02 NOTE — PROGRESS NOTES
Patient here for cycle 1 opdivo/alimta/carboplatin and is doing well, no c/o offered, no changes reported and patient denies pain  She confirms that she takes folic acid daily and decadron day before and day after chemo

## 2021-08-02 NOTE — PLAN OF CARE
Problem: Potential for Falls  Goal: Patient will remain free of falls  Description: INTERVENTIONS:  - Educate patient/family on patient safety including physical limitations  - Instruct patient to call for assistance with activity   - Consult OT/PT to assist with strengthening/mobility   - Keep Call bell within reach  - Keep bed low and locked with side rails adjusted as appropriate  - Keep care items and personal belongings within reach  - Initiate and maintain comfort rounds  - Make Fall Risk Sign visible to staff  -- Apply yellow socks and bracelet for high fall risk patients  - Consider moving patient to room near nurses station  Outcome: Progressing     Problem: Knowledge Deficit  Goal: Patient/family/caregiver demonstrates understanding of disease process, treatment plan, medications, and discharge instructions  Description: Complete learning assessment and assess knowledge base    Interventions:  - Provide teaching at level of understanding  - Provide teaching via preferred learning methods  Outcome: Progressing

## 2021-08-06 ENCOUNTER — TELEPHONE (OUTPATIENT)
Dept: PALLIATIVE MEDICINE | Facility: CLINIC | Age: 71
End: 2021-08-06

## 2021-08-06 DIAGNOSIS — C34.92 ADENOCARCINOMA OF LEFT LUNG, STAGE 4 (HCC): Primary | ICD-10-CM

## 2021-08-06 DIAGNOSIS — Z51.5 PALLIATIVE CARE PATIENT: ICD-10-CM

## 2021-08-06 DIAGNOSIS — R11.0 CHEMOTHERAPY-INDUCED NAUSEA: ICD-10-CM

## 2021-08-06 DIAGNOSIS — T45.1X5A CHEMOTHERAPY-INDUCED NAUSEA: ICD-10-CM

## 2021-08-06 RX ORDER — ONDANSETRON 4 MG/1
4 TABLET, FILM COATED ORAL EVERY 8 HOURS PRN
Qty: 20 TABLET | Refills: 0 | Status: SHIPPED | OUTPATIENT
Start: 2021-08-06 | End: 2021-08-18 | Stop reason: SDUPTHER

## 2021-08-06 NOTE — TELEPHONE ENCOUNTER
Will send script for zofran to [facility] as requested  Patient should not take prescription filled in 2017  Would also recommend her appointment with Dr Carolann Barahona scheduled for October is moved up to follow-up on symptom management

## 2021-08-06 NOTE — TELEPHONE ENCOUNTER
Patient stated her nausea is getting worse she has medication from 2017 of ondansetron and is requesting a refill on this she stated it helps please advise  [facility] [address]

## 2021-08-18 ENCOUNTER — OFFICE VISIT (OUTPATIENT)
Dept: PALLIATIVE MEDICINE | Facility: CLINIC | Age: 71
End: 2021-08-18
Payer: MEDICARE

## 2021-08-18 VITALS
TEMPERATURE: 98 F | DIASTOLIC BLOOD PRESSURE: 58 MMHG | OXYGEN SATURATION: 95 % | WEIGHT: 197 LBS | SYSTOLIC BLOOD PRESSURE: 128 MMHG | RESPIRATION RATE: 14 BRPM | HEART RATE: 77 BPM | HEIGHT: 64 IN | BODY MASS INDEX: 33.63 KG/M2

## 2021-08-18 DIAGNOSIS — I50.32 CHRONIC DIASTOLIC HEART FAILURE (HCC): ICD-10-CM

## 2021-08-18 DIAGNOSIS — C79.52 SECONDARY MALIGNANT NEOPLASM OF BONE AND BONE MARROW (HCC): ICD-10-CM

## 2021-08-18 DIAGNOSIS — R53.83 OTHER FATIGUE: ICD-10-CM

## 2021-08-18 DIAGNOSIS — C79.51 SECONDARY MALIGNANT NEOPLASM OF BONE AND BONE MARROW (HCC): ICD-10-CM

## 2021-08-18 DIAGNOSIS — F32.5 MAJOR DEPRESSIVE DISORDER WITH SINGLE EPISODE, IN FULL REMISSION (HCC): ICD-10-CM

## 2021-08-18 DIAGNOSIS — N18.32 STAGE 3B CHRONIC KIDNEY DISEASE (HCC): ICD-10-CM

## 2021-08-18 DIAGNOSIS — R11.2 CINV (CHEMOTHERAPY-INDUCED NAUSEA AND VOMITING): Chronic | ICD-10-CM

## 2021-08-18 DIAGNOSIS — Z51.5 PALLIATIVE CARE PATIENT: ICD-10-CM

## 2021-08-18 DIAGNOSIS — K21.9 GASTROESOPHAGEAL REFLUX DISEASE WITHOUT ESOPHAGITIS: ICD-10-CM

## 2021-08-18 DIAGNOSIS — T45.1X5A CINV (CHEMOTHERAPY-INDUCED NAUSEA AND VOMITING): Chronic | ICD-10-CM

## 2021-08-18 DIAGNOSIS — C34.92 ADENOCARCINOMA OF LEFT LUNG, STAGE 4 (HCC): Primary | ICD-10-CM

## 2021-08-18 PROCEDURE — 99214 OFFICE O/P EST MOD 30 MIN: CPT | Performed by: INTERNAL MEDICINE

## 2021-08-18 RX ORDER — ONDANSETRON 4 MG/1
4 TABLET, FILM COATED ORAL EVERY 6 HOURS PRN
Qty: 40 TABLET | Refills: 2 | Status: SHIPPED | OUTPATIENT
Start: 2021-08-18 | End: 2021-09-03

## 2021-08-18 NOTE — PATIENT INSTRUCTIONS
It was a pleasure to see you again today  Thank you for coming in  · Increasing frequency and dispensed amount of Zofran; I['m glad it's helping you w/ the nausea  · Return in about 2 months  · Call us for refills on medications that we supply, as needed  · If something changes and you need to come in sooner, please call our office  PRESCRIPTION REFILL REMINDER:  All medication refills should be requested prior to [facility] on Friday  Any refill requests after noon on Friday would be addressed the following Monday  Please protect yourself from the novel Coronavirus (COVID-19)! The numbers of cases of Coronavirus are spiking in [address]  This is not a more dangerous virus, but a sign that more people in a community are spreading the virus  Please check the local disease reports near you if you consider travelling this summer  We do not advise travel to any community or State with a rising viral caseload   Wash your hands! Soap and water, or hand  with at least 60% alcohol, are both effective at killing the virus   Wear a mask! This will help protect others from any virus particles you might spread  Your mouth and nose BOTH need to be covered   Keep the distance! Keep 6 feet of distance from others people, even if they seem healthy  Keeping distance protects you from the other person's virus spread   Thank you for completing the [address] vaccination series

## 2021-08-18 NOTE — PROGRESS NOTES
Follow-up with Palliative and Supportive Care  Izzy Griffith 66 y o  female [id number]    ASSESSMENT & PLAN:  1  Adenocarcinoma of left lung, stage 4 ([facility] [address] )    2  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)    3  Chronic [facility] ([facility]    4  Gastroesophageal reflux disease without esophagitis    5  Major depressive disorder with single episode, in full remission ([facility] )    6  Stage 3b chronic kidney disease ([facility] )    7  CINV (chemotherapy-induced nausea and vomiting)    8  Other fatigue    9  Palliative care patient           Patient denies significant pain but does endorse some R hand numbness s/t chemotherapy-induced nausea  No paresthesia  Counseled  o Chronic knee pain improved w/ infusion steroids; patient states she will forego knee injections while on maintenance chemo as her knee pain is now controlled   Patient has started maintenance treatment and experienced 1 5 weeks of post-chemo N+V  Increasing frequency and dispensed supply of Zofran (effective but she needed more than 20 tabs to get through those days)   Continue venlafaxine   Continue omeprazole   Patient has completed her [employer] COVID-19 vaccination series   Reviewed notes (Medical Oncology, FM, Radiation Oncology, Orthopedics), labs (7/31/21 Cr 1 83, alb 3 1, Hb 11 5; alb was 2 9 on 7/16/21 ), imaging (7/14/21 PET CT)   Emotional support provided   Medication safety issues addressed - no driving under the influence of narcotics, watch for adverse effects including AMS and respiratory depression, keep medications stored in a safe/locked environment        Requested Prescriptions     Signed Prescriptions Disp Refills    ondansetron (ZOFRAN) 4 mg tablet 40 tablet 2     Sig: Take 1 tablet (4 mg total) by mouth every 6 (six) hours as needed for nausea or vomiting       Medications Discontinued During This Encounter   Medication Reason    ondansetron (ZOFRAN) 4 mg tablet Reorder       Representatives have queried the patient's controlled substance dispensing history in the Prescription Drug Monitoring Program in compliance with regulations before I have prescribed any controlled substances  The prescription history is consistent with prescribed therapy and our practice policies  25 minutes were spent face to face with patient with greater than 50% of the time spent in counseling or coordination of care including discussions of symptom assessment and management, medication review and adjustment, psychosocial support, chart review, imaging review and lab review  All of the patient's questions were answered during this discussion  Return in about 2 months (around 10/18/2021)  SUBJECTIVE:  Chief Complaint   Patient presents with    Follow-up    Cancer    Peripheral Neuropathy    Nausea    Counseling    Medication Refill        HPI    Cooper Peterson is a 66 y o  female w/ stage IV lung cancer (diagnosed 2016) w/ osseous metastasis s/p chemotherapy + immunotherapy + RT, DM2, HFpEF, pAfib, CKD3, HTN+HLD  She follows w/ Bere Dozier PA-C + Dr Millie Khan (Medical Oncology), [facility]  Currently receiving maintenance therapy w/ pembrolizumab + carboplatin + nivolumab  Patient is known to [facility]; last seen by me 4/15/21 for symptom management, psychosocial support  Visit today for same  Patient completed her stereotactic lung RT since her last [facility] visit, and started maintenance chemotherapy  Patient experienced 1 5 weeks of nausea and vomiting post-treatment; Zofran helped but she had only been provided w/ a 6-7 day supply as she had needed to take this 3+ times per day  She would like to resume q6h PRN dosing of Zofran for her next anticipated round of post-chemo N+V  Patient denies significant pain; even her chronic non-malignant knee pain has improved   She feels the steroids she received in her infusion are providing lasting benefit and she feels she will not need her intra-articular injections while on this regimen  She repors her mood is stable  She endorses R hand numbness which has increased w/ chemotherapy (no pain or paresthesia)  She reports having chemo brain but does not let the forgetfulness bother her  She compensates by making lists  Patient wishes to continue disease-directed care  PDMP shows no concerns  The following portions of the medical history were reviewed: past medical history, problem list, medication list, and social history      Current Outpatient Medications:     acetaminophen (TYLENOL) 325 mg tablet, Take 650 mg by mouth every 6 (six) hours as needed for mild pain, Disp: , Rfl:     apixaban (Eliquis) 5 mg, TAKE 1 TABLET TWICE A DAY, Disp: 180 tablet, Rfl: 3    Cholecalciferol (VITAMIN D) 2000 units CAPS, Take by mouth, Disp: , Rfl:     cyanocobalamin (VITAMIN B-12) 100 mcg tablet, Take by mouth daily, Disp: , Rfl:     folic acid (FOLVITE) 1 mg tablet, Take 1 tablet (1 mg total) by mouth daily, Disp: 90 tablet, Rfl: 1    furosemide (LASIX) 40 mg tablet, Take 1 tablet (40 mg total) by mouth as needed (leg swelling, fluid overload), Disp: , Rfl:     linaGLIPtin (Tradjenta) 5 MG TABS, Take 5 mg by mouth daily, Disp: 90 tablet, Rfl: 3    losartan (COZAAR) 50 mg tablet, Take 1 tablet (50 mg total) by mouth daily, Disp: 90 tablet, Rfl: 3    metFORMIN (GLUCOPHAGE) 1000 MG tablet, Take 1 tablet (1,000 mg total) by mouth 2 (two) times a day with meals, Disp: 180 tablet, Rfl: 3    metoprolol tartrate (LOPRESSOR) 25 mg tablet, Take 1 tablet (25 mg total) by mouth every 12 (twelve) hours, Disp: 180 tablet, Rfl: 3    omeprazole (PriLOSEC) 20 mg delayed release capsule, Take 1 capsule (20 mg total) by mouth daily, Disp: 90 capsule, Rfl: 3    ondansetron (ZOFRAN) 4 mg tablet, Take 1 tablet (4 mg total) by mouth every 6 (six) hours as needed for nausea or vomiting, Disp: 40 tablet, Rfl: 2    sotalol (BETAPACE) 80 mg tablet, Take 1 tablet (80 mg total) by mouth 2 (two) times a day 1 tab PO BID, Disp: 180 tablet, Rfl: 3    venlafaxine (EFFEXOR) 37 5 mg tablet, TAKE 1 TABLET DAILY, Disp: 90 tablet, Rfl: 3    Review of Systems   Constitutional: Positive for fatigue and unexpected weight change  Negative for activity change  HENT: Positive for sore throat (briefly; now resolved) and trouble swallowing (w/ sore throat; now resolved)  Eyes: Negative for pain and redness  Respiratory: Negative for shortness of breath  Cardiovascular: Negative for chest pain  Gastrointestinal: Positive for nausea (improved after 1 5 weeks) and vomiting (resolved after 1 5 weeks)  Negative for abdominal pain  Endocrine: Negative for polydipsia and polyphagia  Genitourinary: Negative for flank pain  Musculoskeletal: Positive for arthralgias (improved)  Allergic/Immunologic: Positive for immunocompromised state  Neurological: Negative for facial asymmetry  Psychiatric/Behavioral: Positive for decreased concentration  Negative for dysphoric mood and sleep disturbance  The patient is not nervous/anxious  OBJECTIVE:  /58 (BP Location: Left arm, Patient Position: Sitting, Cuff Size: Standard)   Pulse 77   Temp 98 °F (36 7 °C) (Temporal)   Resp 14   Ht 5' 4" (1 626 m)   Wt 89 4 kg (197 lb)   LMP  (LMP Unknown)   SpO2 95%   BMI 33 81 kg/m²   Vital signs reviewed  Physical Exam  Constitutional:       General: She is not in acute distress  Appearance: She is overweight  She is not toxic-appearing  HENT:      Head: Normocephalic and atraumatic  Right Ear: External ear normal       Left Ear: External ear normal    Eyes:      General: No scleral icterus  Right eye: No discharge  Left eye: No discharge  Extraocular Movements: Extraocular movements intact  Conjunctiva/sclera: Conjunctivae normal       Pupils: Pupils are equal, round, and reactive to light     Pulmonary:      Effort: Pulmonary effort is normal  No respiratory distress  Abdominal:      General: There is no distension  Musculoskeletal:      Right lower leg: No edema  Left lower leg: No edema  Skin:     General: Skin is dry  Neurological:      General: No focal deficit present  Mental Status: She is alert and oriented to person, place, and time  Gait: Gait is intact  Psychiatric:         Attention and Perception: Attention normal          Mood and Affect: Mood and affect normal          Speech: Speech normal          Behavior: Behavior normal  Behavior is cooperative  Thought Content: Thought content normal          Cognition and Memory: Cognition and memory normal          Judgment: Judgment normal           Bhargav Stevenson MD  [facility]      Portions of this document may have been created using dictation software and as such some "sound alike" terms may have been generated by the system  Do not hesitate to contact me with any questions or clarifications

## 2021-08-20 ENCOUNTER — APPOINTMENT (OUTPATIENT)
Dept: LAB | Facility: AMBULARY SURGERY CENTER | Age: 71
End: 2021-08-20
Payer: MEDICARE

## 2021-08-20 DIAGNOSIS — C34.92 ADENOCARCINOMA OF LEFT LUNG, STAGE 4 (HCC): ICD-10-CM

## 2021-08-20 LAB
ALBUMIN SERPL BCP-MCNC: 2.6 G/DL (ref 3.5–5)
ALP SERPL-CCNC: 91 U/L (ref 46–116)
ALT SERPL W P-5'-P-CCNC: 18 U/L (ref 12–78)
ANION GAP SERPL CALCULATED.3IONS-SCNC: 5 MMOL/L (ref 4–13)
AST SERPL W P-5'-P-CCNC: 18 U/L (ref 5–45)
BASOPHILS # BLD AUTO: 0.01 THOUSANDS/ΜL (ref 0–0.1)
BASOPHILS NFR BLD AUTO: 0 % (ref 0–1)
BILIRUB SERPL-MCNC: 0.49 MG/DL (ref 0.2–1)
BUN SERPL-MCNC: 22 MG/DL (ref 5–25)
CALCIUM ALBUM COR SERPL-MCNC: 10.6 MG/DL (ref 8.3–10.1)
CALCIUM SERPL-MCNC: 9.5 MG/DL (ref 8.3–10.1)
CHLORIDE SERPL-SCNC: 108 MMOL/L (ref 100–108)
CO2 SERPL-SCNC: 24 MMOL/L (ref 21–32)
CREAT SERPL-MCNC: 1.53 MG/DL (ref 0.6–1.3)
EOSINOPHIL # BLD AUTO: 0.11 THOUSAND/ΜL (ref 0–0.61)
EOSINOPHIL NFR BLD AUTO: 3 % (ref 0–6)
ERYTHROCYTE [DISTWIDTH] IN BLOOD BY AUTOMATED COUNT: 13.4 % (ref 11.6–15.1)
GFR SERPL CREATININE-BSD FRML MDRD: 34 ML/MIN/1.73SQ M
GLUCOSE P FAST SERPL-MCNC: 156 MG/DL (ref 65–99)
HCT VFR BLD AUTO: 32.2 % (ref 34.8–46.1)
HGB BLD-MCNC: 10.1 G/DL (ref 11.5–15.4)
IMM GRANULOCYTES # BLD AUTO: 0.02 THOUSAND/UL (ref 0–0.2)
IMM GRANULOCYTES NFR BLD AUTO: 1 % (ref 0–2)
LYMPHOCYTES # BLD AUTO: 0.31 THOUSANDS/ΜL (ref 0.6–4.47)
LYMPHOCYTES NFR BLD AUTO: 9 % (ref 14–44)
MCH RBC QN AUTO: 27.9 PG (ref 26.8–34.3)
MCHC RBC AUTO-ENTMCNC: 31.4 G/DL (ref 31.4–37.4)
MCV RBC AUTO: 89 FL (ref 82–98)
MONOCYTES # BLD AUTO: 0.54 THOUSAND/ΜL (ref 0.17–1.22)
MONOCYTES NFR BLD AUTO: 15 % (ref 4–12)
NEUTROPHILS # BLD AUTO: 2.65 THOUSANDS/ΜL (ref 1.85–7.62)
NEUTS SEG NFR BLD AUTO: 72 % (ref 43–75)
NRBC BLD AUTO-RTO: 0 /100 WBCS
PLATELET # BLD AUTO: 251 THOUSANDS/UL (ref 149–390)
PMV BLD AUTO: 10.8 FL (ref 8.9–12.7)
POTASSIUM SERPL-SCNC: 4.2 MMOL/L (ref 3.5–5.3)
PROT SERPL-MCNC: 6.5 G/DL (ref 6.4–8.2)
RBC # BLD AUTO: 3.62 MILLION/UL (ref 3.81–5.12)
SODIUM SERPL-SCNC: 137 MMOL/L (ref 136–145)
WBC # BLD AUTO: 3.64 THOUSAND/UL (ref 4.31–10.16)

## 2021-08-20 PROCEDURE — 36415 COLL VENOUS BLD VENIPUNCTURE: CPT

## 2021-08-20 PROCEDURE — 80053 COMPREHEN METABOLIC PANEL: CPT

## 2021-08-20 PROCEDURE — 85025 COMPLETE CBC W/AUTO DIFF WBC: CPT

## 2021-08-23 ENCOUNTER — HOSPITAL ENCOUNTER (OUTPATIENT)
Dept: INFUSION CENTER | Facility: CLINIC | Age: 71
Discharge: HOME | End: 2021-08-23
Payer: MEDICARE

## 2021-08-23 ENCOUNTER — DOCUMENTATION (OUTPATIENT)
Dept: INFUSION CENTER | Facility: CLINIC | Age: 71
End: 2021-08-23

## 2021-08-23 VITALS
RESPIRATION RATE: 16 BRPM | TEMPERATURE: 96.4 F | DIASTOLIC BLOOD PRESSURE: 64 MMHG | OXYGEN SATURATION: 98 % | HEIGHT: 64 IN | HEART RATE: 87 BPM | SYSTOLIC BLOOD PRESSURE: 120 MMHG | WEIGHT: 194.5 LBS | BODY MASS INDEX: 33.2 KG/M2

## 2021-08-23 DIAGNOSIS — C34.92 ADENOCARCINOMA OF LEFT LUNG, STAGE 4 (HCC): Primary | ICD-10-CM

## 2021-08-23 PROCEDURE — 96417 CHEMO IV INFUS EACH ADDL SEQ: CPT

## 2021-08-23 PROCEDURE — 96413 CHEMO IV INFUSION 1 HR: CPT

## 2021-08-23 PROCEDURE — 96411 CHEMO IV PUSH ADDL DRUG: CPT

## 2021-08-23 PROCEDURE — 96367 TX/PROPH/DG ADDL SEQ IV INF: CPT

## 2021-08-23 RX ORDER — SODIUM CHLORIDE 9 MG/ML
20 INJECTION, SOLUTION INTRAVENOUS ONCE
Status: COMPLETED | OUTPATIENT
Start: 2021-08-23 | End: 2021-08-23

## 2021-08-23 RX ADMIN — DEXAMETHASONE SODIUM PHOSPHATE: 10 INJECTION, SOLUTION INTRAMUSCULAR; INTRAVENOUS at 10:10

## 2021-08-23 RX ADMIN — SODIUM CHLORIDE 490 MG: 9 INJECTION, SOLUTION INTRAVENOUS at 11:52

## 2021-08-23 RX ADMIN — SODIUM CHLORIDE 20 ML/HR: 0.9 INJECTION, SOLUTION INTRAVENOUS at 10:11

## 2021-08-23 RX ADMIN — SODIUM CHLORIDE 240 MG: 9 INJECTION, SOLUTION INTRAVENOUS at 11:07

## 2021-08-23 RX ADMIN — CARBOPLATIN 304.5 MG: 10 INJECTION, SOLUTION INTRAVENOUS at 12:15

## 2021-08-23 RX ADMIN — FOSAPREPITANT 150 MG: 150 INJECTION, POWDER, LYOPHILIZED, FOR SOLUTION INTRAVENOUS at 10:31

## 2021-08-23 NOTE — PROGRESS NOTES
Pt to clinic for opdivo, alimta, carbo   Pt offers no complaints at this time, will continue to monitor

## 2021-08-23 NOTE — PROGRESS NOTES
Evaluated patient in infusion center  Erythematous rash to forehead, circumferentially around neck R > L, and on right hand near IV site  Rash blanches and is not raised, well circumscribed borders  Presumed secondary to Opdivo vs alimta  Plan to watch patient for 30 minutes and re-evaluate  No wheezing, tachycardia, SOB, pruritis, sensation of throat closing, tongue edema

## 2021-08-23 NOTE — PLAN OF CARE
Problem: Potential for Falls  Goal: Patient will remain free of falls  Description: INTERVENTIONS:  - Educate patient/family on patient safety including physical limitations  - Instruct patient to call for assistance with activity   - Consult OT/PT to assist with strengthening/mobility   - Keep Call bell within reach  - Keep bed low and locked with side rails adjusted as appropriate  - Keep care items and personal belongings within reach  - Initiate and maintain comfort rounds  - Make Fall Risk Sign visible to staff  - - Apply yellow socks and bracelet for high fall risk patients  - Consider moving patient to room near nurses station  Outcome: Progressing     Problem: Knowledge Deficit  Goal: Patient/family/caregiver demonstrates understanding of disease process, treatment plan, medications, and discharge instructions  Description: Complete learning assessment and assess knowledge base    Interventions:  - Provide teaching at level of understanding  - Provide teaching via preferred learning methods  Outcome: Progressing

## 2021-08-23 NOTE — PROGRESS NOTES
Patient completed Carboplatin infusion at 1315  Upon discontinuing IV, patient reports red, raised itchy rash around IV site and on knuckles above IV site  Patient states she was unaware of rash until this time  Further assessment shows red raised rash to forehead and neck  Patient states she "felt a little funny" during the last few minutes of infusion, with "tingling" gums and chest tightness she related to eating/indigestion  Patient reports she did not report those symptoms to staff because she thought they were insignificant  Patient educated on importance of reporting all symptoms immediately  Patient verbalized understanding  Joann Acevedo RN in Dr Stu Salomon office made aware  Patient ordered observation for thirty minutes  Dane Russo PA up to evaluate  Patient denies further or worsening symptoms  Patient stable for discharge  Patient verbalized understanding to take benadryl at home if needed and to call 911 with worsening of symptoms  Next appointment confirmed  AVS offered and declined

## 2021-08-25 ENCOUNTER — TELEPHONE (OUTPATIENT)
Dept: HEMATOLOGY ONCOLOGY | Facility: CLINIC | Age: 71
End: 2021-08-25

## 2021-08-25 NOTE — TELEPHONE ENCOUNTER
Spoke with patient and r/s her appt with NBA Lee from 8/30 at 8am to 9/3 at 10:30 am in the [facility] office with Bhakti Worley, patient voiced understanding

## 2021-09-03 ENCOUNTER — OFFICE VISIT (OUTPATIENT)
Dept: HEMATOLOGY ONCOLOGY | Facility: CLINIC | Age: 71
End: 2021-09-03
Payer: MEDICARE

## 2021-09-03 VITALS
OXYGEN SATURATION: 98 % | BODY MASS INDEX: 33.03 KG/M2 | HEART RATE: 77 BPM | DIASTOLIC BLOOD PRESSURE: 64 MMHG | HEIGHT: 64 IN | SYSTOLIC BLOOD PRESSURE: 134 MMHG | RESPIRATION RATE: 16 BRPM | WEIGHT: 193.5 LBS

## 2021-09-03 DIAGNOSIS — I48.91 ATRIAL FIBRILLATION, UNSPECIFIED TYPE (HCC): ICD-10-CM

## 2021-09-03 DIAGNOSIS — R11.0 CHEMOTHERAPY-INDUCED NAUSEA: ICD-10-CM

## 2021-09-03 DIAGNOSIS — T45.1X5A CHEMOTHERAPY-INDUCED NAUSEA: ICD-10-CM

## 2021-09-03 DIAGNOSIS — R21 SKIN RASH: ICD-10-CM

## 2021-09-03 DIAGNOSIS — Z63.6 CAREGIVER STRESS: ICD-10-CM

## 2021-09-03 DIAGNOSIS — C34.92 ADENOCARCINOMA OF LEFT LUNG, STAGE 4 (HCC): Primary | ICD-10-CM

## 2021-09-03 DIAGNOSIS — F32.5 MAJOR DEPRESSIVE DISORDER WITH SINGLE EPISODE, IN FULL REMISSION (HCC): ICD-10-CM

## 2021-09-03 DIAGNOSIS — N18.32 STAGE 3B CHRONIC KIDNEY DISEASE (HCC): ICD-10-CM

## 2021-09-03 PROCEDURE — 99215 OFFICE O/P EST HI 40 MIN: CPT | Performed by: PHYSICIAN ASSISTANT

## 2021-09-03 RX ORDER — PROCHLORPERAZINE MALEATE 10 MG
10 TABLET ORAL EVERY 6 HOURS PRN
Qty: 30 TABLET | Refills: 0 | Status: SHIPPED | OUTPATIENT
Start: 2021-09-03 | End: 2021-12-27 | Stop reason: SDUPTHER

## 2021-09-03 RX ORDER — CYANOCOBALAMIN 1000 UG/ML
1000 INJECTION INTRAMUSCULAR; SUBCUTANEOUS ONCE
Status: CANCELLED | OUTPATIENT
Start: 2021-09-24 | End: 2021-09-13

## 2021-09-03 RX ORDER — SODIUM CHLORIDE 9 MG/ML
20 INJECTION, SOLUTION INTRAVENOUS ONCE
Status: CANCELLED | OUTPATIENT
Start: 2021-09-24

## 2021-09-03 RX ORDER — PROCHLORPERAZINE MALEATE 10 MG
10 TABLET ORAL EVERY 6 HOURS PRN
Qty: 30 TABLET | Refills: 0 | Status: SHIPPED | OUTPATIENT
Start: 2021-09-03 | End: 2021-09-03

## 2021-09-03 SDOH — SOCIAL STABILITY - SOCIAL INSECURITY: DEPENDENT RELATIVE NEEDING CARE AT HOME: Z63.6

## 2021-09-10 ENCOUNTER — APPOINTMENT (OUTPATIENT)
Dept: LAB | Facility: AMBULARY SURGERY CENTER | Age: 71
End: 2021-09-10
Payer: MEDICARE

## 2021-09-10 ENCOUNTER — TELEPHONE (OUTPATIENT)
Dept: HEMATOLOGY ONCOLOGY | Facility: CLINIC | Age: 71
End: 2021-09-10

## 2021-09-10 DIAGNOSIS — C34.92 ADENOCARCINOMA OF LEFT LUNG, STAGE 4 (HCC): Primary | ICD-10-CM

## 2021-09-10 DIAGNOSIS — C34.92 ADENOCARCINOMA OF LEFT LUNG, STAGE 4 (HCC): ICD-10-CM

## 2021-09-10 LAB
ALBUMIN SERPL BCP-MCNC: 2.5 G/DL (ref 3.5–5)
ALP SERPL-CCNC: 95 U/L (ref 46–116)
ALT SERPL W P-5'-P-CCNC: 19 U/L (ref 12–78)
ANION GAP SERPL CALCULATED.3IONS-SCNC: 6 MMOL/L (ref 4–13)
AST SERPL W P-5'-P-CCNC: 23 U/L (ref 5–45)
BASOPHILS # BLD AUTO: 0.01 THOUSANDS/ΜL (ref 0–0.1)
BASOPHILS NFR BLD AUTO: 1 % (ref 0–1)
BILIRUB SERPL-MCNC: 0.34 MG/DL (ref 0.2–1)
BUN SERPL-MCNC: 22 MG/DL (ref 5–25)
CALCIUM ALBUM COR SERPL-MCNC: 11.5 MG/DL (ref 8.3–10.1)
CALCIUM SERPL-MCNC: 10.3 MG/DL (ref 8.3–10.1)
CHLORIDE SERPL-SCNC: 110 MMOL/L (ref 100–108)
CO2 SERPL-SCNC: 22 MMOL/L (ref 21–32)
CREAT SERPL-MCNC: 1.71 MG/DL (ref 0.6–1.3)
EOSINOPHIL # BLD AUTO: 0.11 THOUSAND/ΜL (ref 0–0.61)
EOSINOPHIL NFR BLD AUTO: 6 % (ref 0–6)
ERYTHROCYTE [DISTWIDTH] IN BLOOD BY AUTOMATED COUNT: 14.6 % (ref 11.6–15.1)
GFR SERPL CREATININE-BSD FRML MDRD: 29 ML/MIN/1.73SQ M
GLUCOSE P FAST SERPL-MCNC: 147 MG/DL (ref 65–99)
HCT VFR BLD AUTO: 29 % (ref 34.8–46.1)
HGB BLD-MCNC: 9.1 G/DL (ref 11.5–15.4)
IMM GRANULOCYTES # BLD AUTO: 0.03 THOUSAND/UL (ref 0–0.2)
IMM GRANULOCYTES NFR BLD AUTO: 2 % (ref 0–2)
LYMPHOCYTES # BLD AUTO: 0.34 THOUSANDS/ΜL (ref 0.6–4.47)
LYMPHOCYTES NFR BLD AUTO: 17 % (ref 14–44)
MCH RBC QN AUTO: 28.1 PG (ref 26.8–34.3)
MCHC RBC AUTO-ENTMCNC: 31.4 G/DL (ref 31.4–37.4)
MCV RBC AUTO: 90 FL (ref 82–98)
MONOCYTES # BLD AUTO: 0.54 THOUSAND/ΜL (ref 0.17–1.22)
MONOCYTES NFR BLD AUTO: 27 % (ref 4–12)
NEUTROPHILS # BLD AUTO: 0.96 THOUSANDS/ΜL (ref 1.85–7.62)
NEUTS SEG NFR BLD AUTO: 47 % (ref 43–75)
NRBC BLD AUTO-RTO: 0 /100 WBCS
PLATELET # BLD AUTO: 271 THOUSANDS/UL (ref 149–390)
PMV BLD AUTO: 11.2 FL (ref 8.9–12.7)
POTASSIUM SERPL-SCNC: 4.4 MMOL/L (ref 3.5–5.3)
PROT SERPL-MCNC: 6.7 G/DL (ref 6.4–8.2)
RBC # BLD AUTO: 3.24 MILLION/UL (ref 3.81–5.12)
SODIUM SERPL-SCNC: 138 MMOL/L (ref 136–145)
WBC # BLD AUTO: 1.99 THOUSAND/UL (ref 4.31–10.16)

## 2021-09-10 PROCEDURE — 36415 COLL VENOUS BLD VENIPUNCTURE: CPT

## 2021-09-10 PROCEDURE — 80053 COMPREHEN METABOLIC PANEL: CPT

## 2021-09-10 PROCEDURE — 85025 COMPLETE CBC W/AUTO DIFF WBC: CPT

## 2021-09-10 NOTE — TELEPHONE ENCOUNTER
Reviewed with Mira Ortiz  Will defer tx for 1 week  Called [facility] to advise them of this  They will reach out to pt to reschedule  I called pt and left detailed voicemail advising her of this  Left [facility] number to call back with any questions

## 2021-09-10 NOTE — PROGRESS NOTES
Spoke with Summer Mckeon RN in Dr Jacy Peres office for [address] - patient initially scheduled for treatment 09/13/2021 but will be deferred x1 week due to [address] [place] [address] Way [phone]  Yahaira Neves states they will reach out to patient betty cunningham, and then for us to please contact that patient to reschedule  [facility] infusion staff aware

## 2021-09-10 NOTE — TELEPHONE ENCOUNTER
Critical Results   Call Received From [facility]   Lab Study WBC   Date Blood Work was Done 09/10   Read Back of Information Done yes   Relevant Information WBC 1 99

## 2021-09-10 NOTE — TELEPHONE ENCOUNTER
I contacted Deangelo Almanza to go over her new schedule for treatment  I left her VM to notify her of her next appnt on 9/24 at 12pm  I provided our return number if she needs to go over adjustments

## 2021-09-13 ENCOUNTER — APPOINTMENT (OUTPATIENT)
Dept: RADIATION ONCOLOGY | Facility: HOSPITAL | Age: 71
End: 2021-09-13
Attending: RADIOLOGY
Payer: MEDICARE

## 2021-09-13 ENCOUNTER — HOSPITAL ENCOUNTER (OUTPATIENT)
Dept: INFUSION CENTER | Facility: CLINIC | Age: 71
Discharge: HOME | End: 2021-09-13

## 2021-09-14 ENCOUNTER — TELEPHONE (OUTPATIENT)
Dept: HEMATOLOGY ONCOLOGY | Facility: CLINIC | Age: 71
End: 2021-09-14

## 2021-09-14 NOTE — TELEPHONE ENCOUNTER
Patient called stating she received a letter from [facility] stating she received a letter that Kevin Crouch does not want [facility] to refill her prochlorperazine (COMPAZINE) 10 mg tablet  Patient would like clarification    Please call patient back at [phone]

## 2021-09-14 NOTE — TELEPHONE ENCOUNTER
Called patient and informed her she is okay to take the compazine and this was ordered 9/3  Patient verbalized understanding

## 2021-09-21 ENCOUNTER — CLINICAL SUPPORT (OUTPATIENT)
Dept: RADIATION ONCOLOGY | Facility: HOSPITAL | Age: 71
End: 2021-09-21
Attending: RADIOLOGY
Payer: MEDICARE

## 2021-09-21 VITALS
HEART RATE: 74 BPM | BODY MASS INDEX: 32.54 KG/M2 | SYSTOLIC BLOOD PRESSURE: 136 MMHG | RESPIRATION RATE: 20 BRPM | HEIGHT: 64 IN | DIASTOLIC BLOOD PRESSURE: 75 MMHG | OXYGEN SATURATION: 95 % | TEMPERATURE: 97.6 F | WEIGHT: 190.6 LBS

## 2021-09-21 DIAGNOSIS — C34.92 ADENOCARCINOMA OF LEFT LUNG, STAGE 4 (HCC): Primary | ICD-10-CM

## 2021-09-21 DIAGNOSIS — C79.51 SECONDARY MALIGNANT NEOPLASM OF BONE AND BONE MARROW (HCC): ICD-10-CM

## 2021-09-21 DIAGNOSIS — C41.9 MALIGNANT NEOPLASM OF BONE WITH METASTASES (HCC): Primary | ICD-10-CM

## 2021-09-21 DIAGNOSIS — C79.52 SECONDARY MALIGNANT NEOPLASM OF BONE AND BONE MARROW (HCC): ICD-10-CM

## 2021-09-21 PROCEDURE — 99211 OFF/OP EST MAY X REQ PHY/QHP: CPT | Performed by: RADIOLOGY

## 2021-09-21 PROCEDURE — 99213 OFFICE O/P EST LOW 20 MIN: CPT | Performed by: RADIOLOGY

## 2021-09-21 NOTE — PROGRESS NOTES
Jayleen Servin 1950 is a 66 y o  female with a history of stage IV non-small cell lung cancer diagnosed initially in 2016, with a rather indolent disease course, currently with minimal systemic disease burden and on break from systemic therapy  She was referred to our department for consideration of lung stereotactic radiation  Her most recent imaging revealed a dominant left upper lobe lesion which had been very slowly increasing in size over the past few years with an SUV of 4 7, currently measuring 2 7 cm  This lesion had been present ever since her initial diagnosis  Adjacent and lateral to that lesion, there was a 2nd lesion ground-glass in appearance, also slowly enlarging over the past few years with minimal hypermetabolic activity likely representing a distinct synchronous low-grade neoplasm  This lesion measured 3 mm in 2018 and currently measures 1 3 cm  She has now undergone definitive stereotactic radiation to both lesions, with treatment delivered concurrently via a single isocenter plan, completing 8 fractions to left lung on 4/21/21  The pt was last seen in radiation on 06/16/21 via telemedicine visit  She returns today for her follow up visit     07/14/21 - PET CT skull base to mid thigh  IMPRESSION:  1  There is now a large FDG avid masslike consolidation in the left upper lobe  This is in the region of the previously seen FDG avid lesion  Adjacent patchy focus of FDG uptake in the left lower lobe superior segment  On reformats, overall, this has   a bandlike configuration and would suggest post treatment changes but underlying malignancy is not excluded  2   Increased FDG uptake in the enlarging right lower lobe lesion suspicious for malignancy  3   No findings for hypermetabolic metastasis to the neck, abdomen or pelvis  4  Mild persistent FDG uptake at the level of the left scapula and adjacent soft tissues likely related to treated disease    No new findings suspicious for osseous metastasis  The study was marked in EPIC for significant notification  21 - Hem Cecy Ochoa  Pt to start on Nivolumab 240 mg flat dose every 3 weeks, Pemetrexed 250, Carboplatin AUC 5 every 3 weeks    21 - Palliative Care, [place]  Pt denies significant pain but has some R hand numbness  Pt started maintenance treatment    21 - Hem Onc, [place]  Benadryl added to premedication and dexamethasone increased  due to treatment reactions  Pt had rash along hairline with first cycle, and rash on hands and face with second  Last infusion approx 21-Reaction-infusion (Opdivo) on hold- Also, WBC abn  Repeat labs this week-infusion admin to be determined  Upcomin21 - CT chest wo contrast  10/01/21 - Cecy Cline  10/13/21 - Palliative, [place]  22 - MRI  22 - Surg [facility]              Follow up visit       Oncology History   Adenocarcinoma of lung, stage 4 ([address] )   10/18/2016 Initial Diagnosis    Adenocarcinoma of lung, stage 4 ([address] )     10/18/2016 Biopsy    Final Diagnosis  A  Bone, T10, biopsy:     - Non-small cell carcinoma with features suggesting adenocarcinoma; see note           2016 - 2016 Radiation    Course 1: T9 - T11 SPINE  13 fractions   Total Dose = 3,250 cGy     2016 Biopsy    Final Diagnosis  Lymph Node, Level 7: Conclusive evidence of Malignancy  Poorly differentiated non-small cell carcinoma      Lung, right main stem bronchus, biopsy:              - Poorly differentiated adenocarcinoma       12/15/2016 - 2017 Chemotherapy    Pembrolizumab 200 mg IV flat dose every 3 weeks      2017 Biopsy    Bone, left scapula, biopsy:  -  Positive for malignancy, consistent with metastatic adenocarcinoma          10/3/2017 - 10/16/2017 Radiation    palliative course of radiation therapy to the left scapular lesion to 3000 cGy( metastatic from adenocarcinoma of the lung)          4/10/2018 - 10/9/2018 Chemotherapy    nivolumab with prednisone 10 mg p o     11/6/2018 -  Chemotherapy    11/6/18   Alimta 500 milligram/meter squared, carboplatin AUC 5 every 3 weeks,     11/6/2018 - 3/1/2021 Chemotherapy    cyanocobalamin injection 1,000 mcg, 1,000 mcg, Intramuscular, Once, 6 of 10 cycles  Administration: 1,000 mcg (1/17/2020), 1,000 mcg (6/10/2020), 1,000 mcg (10/14/2020), 1,000 mcg (1/5/2021)  fosaprepitant (EMEND) 150 mg in sodium chloride 0 9 % 250 mL IVPB, 150 mg, Intravenous, Once, 1 of 6 cycles  Administration: 150 mg (2/2/2021)  PEMEtrexed (ALIMTA) 1,020 mg in sodium chloride 0 9 % 100 mL chemo infusion, 500 mg/m2 = 1,020 mg, Intravenous, Once, 32 of 37 cycles  Dose modification: 400 mg/m2 (original dose 500 mg/m2, Cycle 23, Reason: Other (See Comments), Comment: decreasing crcl), 300 mg/m2 (original dose 500 mg/m2, Cycle 26, Reason: Treatment Parameters Not Met), 300 mg/m2 (original dose 500 mg/m2, Cycle 30, Reason: Max Dose Reached)  Administration: 1,020 mg (5/24/2019), 1,000 mg (6/14/2019), 1,000 mg (7/5/2019), 1,000 mg (8/2/2019), 1,000 mg (8/23/2019), 1,000 mg (9/13/2019), 1,000 mg (10/4/2019), 1,000 mg (10/25/2019), 1,000 mg (11/15/2019), 1,000 mg (12/6/2019), 1,000 mg (12/27/2019), 1,000 mg (1/17/2020), 1,000 mg (2/7/2020), 800 mg (5/22/2020), 800 mg (6/10/2020), 800 mg (7/1/2020), 600 mg (7/29/2020), 800 mg (8/19/2020), 600 mg (11/11/2020), 600 mg (1/5/2021), 600 mg (2/2/2021), 600 mg (10/14/2020)     11/12/2018 - 11/26/2018 Radiation    Course: C3: Left Scapula retreat  10 fractions  2,000 cGy         4/5/2021 - 4/21/2021 Radiation    Course: C4 SBRT    Plan ID Energy Fractions Dose per Fraction (cGy) Dose Correction (cGy) Total Dose Delivered (cGy) Elapsed Days   SBRT LtLung 6X 8 / 8 750 0 6,000 16      Dr Fernie Gardner     8/2/2021 -  Chemotherapy    cyanocobalamin, 1,000 mcg, Intramuscular, Once, 2 of 3 cycles  Administration: 1,000 mcg (7/19/2021)  fosaprepitant (EMEND) IVPB, 150 mg, Intravenous, Once, 3 of 6 cycles  Administration: 150 mg (8/2/2021), 150 mg (8/23/2021)  nivolumab (OPDIVO) IVPB, 240 mg (100 % of original dose 240 mg), Intravenous, Once, 3 of 6 cycles  Dose modification: 240 mg (original dose 240 mg, Cycle 1)  Administration: 240 mg (8/2/2021), 240 mg (8/23/2021)  CARBOplatin (PARAPLATIN) IVPB (GOG AUC DOSING), 275 mg, Intravenous, Once, 3 of 6 cycles  Dose modification: 304 5 mg (original dose 279 5 mg, Cycle 3, Reason: Other (Must fill in a comment), Comment:  to sign order)  Administration: 275 mg (8/2/2021), 304 5 mg (8/23/2021)  pemetrexed (ALIMTA) chemo infusion, 490 mg (50 % of original dose 500 mg/m2), Intravenous, Once, 3 of 6 cycles  Dose modification: 250 mg/m2 (original dose 500 mg/m2, Cycle 1, Reason: Anticipated Tolerance)  Administration: 500 mg (8/2/2021), 490 mg (8/23/2021)     Malignant neoplasm of unspecified part of right bronchus or lung (HCC) (Resolved)   5/24/2017 Initial Diagnosis    Malignant neoplasm of unspecified part of right bronchus or lung (HCC) (Resolved)     4/5/2021 - 4/21/2021 Radiation    Course: C4 SBRT    Plan ID Energy Fractions Dose per Fraction (cGy) Dose Correction (cGy) Total Dose Delivered (cGy) Elapsed Days   SBRT LtLung 6X 8 / 8 750 0 6,000 12      Dr Alan Monreal     Malignant neoplasm of bone with metastases ([facility] )   5/24/2017 Initial Diagnosis    Malignant neoplasm of bone with metastases ([facility] )     4/5/2021 - 4/21/2021 Radiation    Course: C4 SBRT    Plan ID Energy Fractions Dose per Fraction (cGy) Dose Correction (cGy) Total Dose Delivered (cGy) Elapsed Days   SBRT LtLung 6X 8 / 8 750 0 6,000 16      Dr Alan Monreal         Clinical Trial: no    Health Maintenance   Topic Date Due    COVID-19 Vaccine (3 - Pfizer risk 3-dose series) 04/09/2021    DM Eye Exam  05/09/2021    Influenza Vaccine (1) 09/01/2021    HEMOGLOBIN A1C  10/01/2021    Fall Risk  12/10/2021    Medicare Annual Wellness Visit (AWV)  12/10/2021    Diabetic Foot Exam  12/10/2021    Depression Remission PHQ  12/10/2021    BMI: Followup Plan  04/19/2022    BMI: Adult  09/03/2022    Colorectal Cancer Screening  04/30/2028    DTaP,Tdap,and Td Vaccines (2 - Td or Tdap) 10/06/2028    Hepatitis C Screening  Completed    Pneumococcal Vaccine: 65+ Years  Completed    HIB Vaccine  Aged Out    Hepatitis B Vaccine  Aged Out    IPV Vaccine  Aged Out    Hepatitis A Vaccine  Aged Out    Meningococcal ACWY Vaccine  Aged Out    HPV Vaccine  Aged Out       Patient Active Problem List   Diagnosis    Paroxysmal atrial fibrillation (HCC)    BMI 33 0-33 9,adult    Skin rash    Adenocarcinoma of lung, stage 4 ([place] [address] )    Chronic diastolic heart failure ([place] [address] )    Cancer related pain    Atrial fibrillation ([place] [address] )    Benign hypertension with CKD (chronic kidney disease) stage III ([place] [address] )    Diabetes mellitus type 2, uncomplicated ([place] [address] )    Malignant neoplasm of bone with metastases (HCC)    Acid reflux    Hyperlipidemia    Insomnia    Major depressive disorder with single episode, in full remission ([place] [address] )    Vitamin D deficiency    Adhesive capsulitis of shoulder    Osteoarthritis of knee    Anxiety    Lung nodule, multiple    Hypertensive heart disease with congestive heart failure ([address] )    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)    Other fatigue    CINV (chemotherapy-induced nausea and vomiting)    Cyst of pancreas    Caregiver stress    Localized swelling of left foot    Pneumonitis    Anemia in stage 3 chronic kidney disease (HCC)    Palliative care patient    Stage 3b [facility]    Persistent proteinuria    Hypercalcemia    Medicare annual wellness visit, subsequent    Iron deficiency anemia     Past Medical History:   Diagnosis Date    Arthritis     At[facility] fibrillation ([facility]  )     Diabetes mellitus ([address] )     Diabetes mellitus type 2, uncomplicated ([place] [address] )     Last assessed: 8/17/17    Essential hypertension     Frozen shoulder     L shoulder    GERD (gastroesophageal reflux disease)     Hx of cancer of uterus     Last assessed: 8/21/15    Hyperlipidemia     Hyponatremia 11/16/2016    Lung mass     diagnosed 9/2016    Malignant neoplasm without specification of site ([facility] )     Skin cancer, basal cell     right eye area    Stage 4 lung cancer ([facility] )      Past Surgical History:   Procedure Laterality Date    APPENDECTOMY      BRONCHOSCOPY N/A 11/17/2016    Procedure: BRONCHOSCOPY FLEXIBLE;  Surgeon: Margarita Easley MD;  [facility];  Service:    [facility] CHOLECYSTECTOMY      COLONOSCOPY      GALLBLADDER SURGERY      HYSTERECTOMY  2000    Total abdominal    LARYNGOSCOPY      Flexible Fiberoptic, (Therapeutic), Resolved: 11/17/16    MOHS SURGERY      Micrographic Surgery Face    NM BRONCHOSCOPY NEEDLE BX TRACHEA MAIN STEM&/BRON N/A 11/17/2016    Procedure: EBUS; FROZEN SECTION ;  Surgeon: Margarita Easley MD;  [facility];  Service: Thoracic    NM [address] N/A 5/24/2017    Procedure: Paul Lunger;  Surgeon: Kavya Jensen MD;  Location: [facility]; Service: Pulmonary    TONSILECTOMY AND ADNOIDECTOMY      TOTAL KNEE ARTHROPLASTY Right 09/23/2014     Family History   Problem Relation Age of Onset    Heart disease Father         cardiac disorder    Hypertension Father     Arthritis Father     Stroke Father         cerebrovascular accident    Skin cancer Father     Diabetes Mother     Heart disease Mother    [facility] Dementia Mother     Hypertension Mother     Thyroid disease Mother     Cancer Maternal Grandfather         of unknown origin    Cancer Family     Depression Family     Diabetes Family     Hyperlipidemia Family         essential    Heart disease Family     Hypertension Family     Stroke Family         syndrome    Lung cancer Paternal Uncle     Muscular dystrophy Brother      Social History     Socioeconomic History    Marital status:       Spouse name: Not on file    Number of children: Not on file    Years of education: Not on file    Highest education level: Not on file   Occupational History    Occupation: retired   Tobacco Use    Smoking status: Former Smoker     Packs/day: 1 00     Years: 50 00     Pack years: 50 00     Types: Cigarettes     Start date:      Quit date:      Years since quittin 7    Smokeless tobacco: Never Used    Tobacco comment: Quit in    Substance and Sexual Activity    Alcohol use: No    Drug use: No    Sexual activity: Not Currently   Other Topics Concern    Not on file   Social History Narrative    Lives with family  Cares for her brother who is disabled  Daily caffeinated coffee consumption     Social Determinants of Health     Financial Resource Strain:     Difficulty of Paying Living Expenses:    Food Insecurity:     Worried About Running Out of Food in the Last Year:     [address] in the Last Year:    Transportation Needs:     Lack of Transportation (Medical):      Lack of Transportation (Non-Medical):    Physical Activity:     Days of Exercise per Week:     Minutes of Exercise per Session:    Stress:     Feeling of Stress :    Social Connections:     Frequency of Communication with Friends and Family:     Frequency of Social Gatherings with Friends and Family:     Attends [employer] Services:     Active Member of Clubs or Organizations:     Attends Club or Organization Meetings:     Marital Status:    Intimate Partner Violence:     Fear of Current or Ex-Partner:     Emotionally Abused:     Physically Abused:     Sexually Abused:        Current Outpatient Medications:     acetaminophen (TYLENOL) 325 mg tablet, Take 650 mg by mouth every 6 (six) hours as needed for mild pain, Disp: , Rfl:     apixaban (Eliquis) 5 mg, TAKE 1 TABLET TWICE A DAY, Disp: 180 tablet, Rfl: 3    Cholecalciferol (VITAMIN D) 2000 units CAPS, Take by mouth, Disp: , Rfl:     cyanocobalamin (VITAMIN B-12) 100 mcg tablet, Take by mouth daily, Disp: , Rfl:     folic acid (FOLVITE) 1 mg tablet, Take 1 tablet (1 mg total) by mouth daily, Disp: 90 tablet, Rfl: 1    furosemide (LASIX) 40 mg tablet, Take 1 tablet (40 mg total) by mouth as needed (leg swelling, fluid overload), Disp: , Rfl:     linaGLIPtin (Tradjenta) 5 MG TABS, Take 5 mg by mouth daily, Disp: 90 tablet, Rfl: 3    losartan (COZAAR) 50 mg tablet, Take 1 tablet (50 mg total) by mouth daily, Disp: 90 tablet, Rfl: 3    metFORMIN (GLUCOPHAGE) 1000 MG tablet, Take 1 tablet (1,000 mg total) by mouth 2 (two) times a day with meals, Disp: 180 tablet, Rfl: 3    metoprolol tartrate (LOPRESSOR) 25 mg tablet, Take 1 tablet (25 mg total) by mouth every 12 (twelve) hours, Disp: 180 tablet, Rfl: 3    omeprazole (PriLOSEC) 20 mg delayed release capsule, Take 1 capsule (20 mg total) by mouth daily, Disp: 90 capsule, Rfl: 3    prochlorperazine (COMPAZINE) 10 mg tablet, Take 1 tablet (10 mg total) by mouth every 6 (six) hours as needed for nausea or vomiting, Disp: 30 tablet, Rfl: 0    sotalol (BETAPACE) 80 mg tablet, Take 1 tablet (80 mg total) by mouth 2 (two) times a day 1 tab PO BID, Disp: 180 tablet, Rfl: 3    venlafaxine (EFFEXOR) 37 5 mg tablet, TAKE 1 TABLET DAILY, Disp: 90 tablet, Rfl: 3  Allergies   Allergen Reactions    Amoxicillin Rash and Hives    Cardizem [Diltiazem] Rash     Rash      Statins Myalgia     Severe muscle aching  Terrible pains       Review of Systems:  Review of Systems   Constitutional: Positive for fatigue (Not new but increased fatigue w/chemo)  HENT: Negative  Eyes:        "Floaters"--no eye follow up      Respiratory: Positive for cough (New onset x one month)  Cardiovascular: Positive for chest pain ("left side of chest x 4wks-sticker pain" per pt  )  Gastrointestinal: Negative  Endocrine: Negative  Genitourinary: Negative  Musculoskeletal: Negative  Skin: Negative  Allergic/Immunologic: Negative      Neurological: Positive for dizziness (Random-not new)  Pt notes "neuropathy in right hand"  And inflammation noted in the hand post reaction w/last chemo  Aug  2021    Hematological: Negative  Psychiatric/Behavioral: Negative  Vitals:    09/21/21 1323   BP: 136/75   BP Location: Right arm   Patient Position: Sitting   Pulse: 74   Resp: 20   Temp: 97 6 °F (36 4 °C)   SpO2: 95%   Weight: 86 5 kg (190 lb 9 6 oz)   Height: 5' 4" (1 626 m)      Pain Score: 0-No pain    Imaging:No results found

## 2021-09-21 NOTE — PROGRESS NOTES
Follow-up - Radiation Oncology   [facility] 1950 66 y o  female [id number]      History of Present Illness   Cancer Staging  See Below    Raissa Brian is a 66y o  year old female with a history of a stage IV non-small cell lung cancer diagnosed initially in 2016, with a rather indolent disease course, currently with minimal systemic disease burden and on break from systemic therapy  She was referred to our department for consideration of lung stereotactic radiation  Her most recent imaging revealed a dominant left upper lobe lesion which had been very slowly increasing in size over the past few years with an SUV of 4 7, currently measuring 2 7 cm  This lesion had been present ever since her initial diagnosis  Adjacent and lateral to that lesion, there was a 2nd lesion ground-glass in appearance, also slowly enlarging over the past few years with minimal hypermetabolic activity likely representing a distinct synchronous low-grade neoplasm  This lesion measured 3 mm in 2018 and currently measures 1 3 cm  She has now undergone definitive stereotactic radiation to both lesions, with treatment delivered concurrently via a single isocenter plan, completing 8 fractions to left lung on 4/21/21  The pt was last seen in radiation on 06/16/21 via telemedicine visit  She returns today for her follow up visit      07/14/21 - PET CT skull base to mid thigh  IMPRESSION:  1   There is now a large FDG avid masslike consolidation in the left upper lobe   This is in the region of the previously seen FDG avid lesion   Adjacent patchy focus of FDG uptake in the left lower lobe superior segment   On reformats, overall, this has   a bandlike configuration and would suggest post treatment changes but underlying malignancy is not excluded  2   Increased FDG uptake in the enlarging right lower lobe lesion suspicious for malignancy  3   No findings for hypermetabolic metastasis to the neck, abdomen or pelvis    4  Mild persistent FDG uptake at the level of the left scapula and adjacent soft tissues likely related to treated disease   No new findings suspicious for osseous metastasis  The study was marked in EPIC for significant notification      21 - Hem OncCecy  Pt to start on Nivolumab 240 mg flat dose every 3 weeks, Pemetrexed 250, Carboplatin AUC 5 every 3 weeks     21 - Palliative Care, Dennys Dominguez  Pt denies significant pain but has some R hand numbness  Pt started maintenance treatment     21 - Hem Onc, [place]  Benadryl added to premedication and dexamethasone increased  due to treatment reactions  Pt had rash along hairline with first cycle, and rash on hands and face with second       Last infusion approx 21-Reaction-infusion (Opdivo) on hold- Also, WBC abn  Repeat labs this week and infusion administration to be determined  She is seen for follow-up today alone  She reports she has lost about 4 lb over the last month  She has stable shortness of breath with some mild cough and no hemoptysis  She does care for her 22-year-old brother who is handicapped  She denies any bony aches or pains  She is currently taking no pain medications  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      Upcomin21 - CT chest wo contrast  21 - Infusion resume treatment  10/01/21 - Hem OncHeidy  10/13/21 - Palliative, Kirby  22 - MRI  22 - Surg Onc, Lorin Baltazar    Historical Information   Oncology History   Adenocarcinoma of lung, stage 4 ([address] )   10/18/2016 Initial Diagnosis    Adenocarcinoma of lung, stage 4 ([address] )     10/18/2016 Biopsy    Final Diagnosis  A  Bone, T10, biopsy:     - Non-small cell carcinoma with features suggesting adenocarcinoma; see note           2016 - 2016 Radiation    Course 1: T9 - T11 SPINE  13 fractions   Total Dose = 3,250 cGy     2016 Biopsy    Final Diagnosis  Lymph Node, Level 7: Conclusive evidence of Malignancy  Poorly differentiated non-small cell carcinoma      Lung, right main stem bronchus, biopsy:              - Poorly differentiated adenocarcinoma       12/15/2016 - 6/1/2017 Chemotherapy    Pembrolizumab 200 mg IV flat dose every 3 weeks      9/13/2017 Biopsy    Bone, left scapula, biopsy:  -  Positive for malignancy, consistent with metastatic adenocarcinoma          10/3/2017 - 10/16/2017 Radiation    palliative course of radiation therapy to the left scapular lesion to 3000 cGy( metastatic from adenocarcinoma of the lung)          4/10/2018 - 10/9/2018 Chemotherapy    nivolumab with prednisone 10 mg p o     11/6/2018 -  Chemotherapy    11/6/18   Alimta 500 milligram/meter squared, carboplatin AUC 5 every 3 weeks,     11/6/2018 - 3/1/2021 Chemotherapy    cyanocobalamin injection 1,000 mcg, 1,000 mcg, Intramuscular, Once, 6 of 10 cycles  Administration: 1,000 mcg (1/17/2020), 1,000 mcg (6/10/2020), 1,000 mcg (10/14/2020), 1,000 mcg (1/5/2021)  fosaprepitant (EMEND) 150 mg in sodium chloride 0 9 % 250 mL IVPB, 150 mg, Intravenous, Once, 1 of 6 cycles  Administration: 150 mg (2/2/2021)  PEMEtrexed (ALIMTA) 1,020 mg in sodium chloride 0 9 % 100 mL chemo infusion, 500 mg/m2 = 1,020 mg, Intravenous, Once, 32 of 37 cycles  Dose modification: 400 mg/m2 (original dose 500 mg/m2, Cycle 23, Reason: Other (See Comments), Comment: decreasing crcl), 300 mg/m2 (original dose 500 mg/m2, Cycle 26, Reason: Treatment Parameters Not Met), 300 mg/m2 (original dose 500 mg/m2, Cycle 30, Reason: Max Dose Reached)  Administration: 1,020 mg (5/24/2019), 1,000 mg (6/14/2019), 1,000 mg (7/5/2019), 1,000 mg (8/2/2019), 1,000 mg (8/23/2019), 1,000 mg (9/13/2019), 1,000 mg (10/4/2019), 1,000 mg (10/25/2019), 1,000 mg (11/15/2019), 1,000 mg (12/6/2019), 1,000 mg (12/27/2019), 1,000 mg (1/17/2020), 1,000 mg (2/7/2020), 800 mg (5/22/2020), 800 mg (6/10/2020), 800 mg (7/1/2020), 600 mg (7/29/2020), 800 mg (8/19/2020), 600 mg (11/11/2020), 600 mg (1/5/2021), 600 mg (2/2/2021), 600 mg (10/14/2020)     11/12/2018 - 11/26/2018 Radiation    Course: C3: Left Scapula retreat  10 fractions  2,000 cGy         4/5/2021 - 4/21/2021 Radiation    Course: C4 SBRT    Plan ID Energy Fractions Dose per Fraction (cGy) Dose Correction (cGy) Total Dose Delivered (cGy) Elapsed Days   SBRT LtLung 6X 8 / 8 750 0 6,000 16      Dr Garo Mclain     8/2/2021 -  Chemotherapy    cyanocobalamin, 1,000 mcg, Intramuscular, Once, 2 of 3 cycles  Administration: 1,000 mcg (7/19/2021)  fosaprepitant (EMEND) IVPB, 150 mg, Intravenous, Once, 3 of 6 cycles  Administration: 150 mg (8/2/2021), 150 mg (8/23/2021)  nivolumab (OPDIVO) IVPB, 240 mg (100 % of original dose 240 mg), Intravenous, Once, 3 of 6 cycles  Dose modification: 240 mg (original dose 240 mg, Cycle 1)  Administration: 240 mg (8/2/2021), 240 mg (8/23/2021)  CARBOplatin (PARAPLATIN) IVPB (GOG AUC DOSING), 275 mg, Intravenous, Once, 3 of 6 cycles  Dose modification: 304 5 mg (original dose 279 5 mg, Cycle 3, Reason: Other (Must fill in a comment), Comment:  to sign order)  Administration: 275 mg (8/2/2021), 304 5 mg (8/23/2021)  pemetrexed (ALIMTA) chemo infusion, 490 mg (50 % of original dose 500 mg/m2), Intravenous, Once, 3 of 6 cycles  Dose modification: 250 mg/m2 (original dose 500 mg/m2, Cycle 1, Reason: Anticipated Tolerance)  Administration: 500 mg (8/2/2021), 490 mg (8/23/2021)     Malignant neoplasm of unspecified part of right bronchus or lung (HCC) (Resolved)   5/24/2017 Initial Diagnosis    Malignant neoplasm of unspecified part of right bronchus or lung (HCC) (Resolved)     4/5/2021 - 4/21/2021 Radiation    Course: C4 SBRT    Plan ID Energy Fractions Dose per Fraction (cGy) Dose Correction (cGy) Total Dose Delivered (cGy) Elapsed Days   SBRT LtLung 6X 8 / 8 750 0 6,000 16      Dr Garo Mclain     Malignant neoplasm of bone with metastases ([facility] )   5/24/2017 Initial Diagnosis    [facility]     4/5/2021 - 4/21/2021 Radiation    Course: C4 SBRT    Plan ID Energy Fractions Dose per Fraction (cGy) Dose Correction (cGy) Total Dose Delivered (cGy) Elapsed Days   SBRT LtLung 6X 8 / 8 750 0 6,000 12      Dr Molina Kurtz         Past Medical History:   Diagnosis Date    Arthritis     Atrial fibrillation ([place] [address] )     Diabetes mellitus ([place] [address] )     Diabetes mellitus type 2, uncomplicated ([place] [address] )     Last assessed: 8/17/17    Essential hypertension     Frozen shoulder     L shoulder    GERD (gastroesophageal reflux disease)     Hx of cancer of uterus     Last assessed: 8/21/15    Hyperlipidemia     Hyponatremia 11/16/2016    Lung mass     diagnosed 9/2016    Malignant neoplasm without specification of site ([place] [address] )     Skin cancer, basal cell     right eye area    Stage 4 lung cancer ([place] [address] )      Past Surgical History:   Procedure Laterality Date    APPENDECTOMY      BRONCHOSCOPY N/A 11/17/2016    Procedure: BRONCHOSCOPY FLEXIBLE;  Surgeon: Tiera Phelps MD;  [facility];  Service:    Feliz Garcias CHOLECYSTECTOMY      COLONOSCOPY      GALLBLADDER SURGERY      HYSTERECTOMY  2000    Total abdominal    LARYNGOSCOPY      Flexible Fiberoptic, (Therapeutic), Resolved: 11/17/16    MOHS SURGERY      Micrographic Surgery Face    [place] BRONCHOSCOPY NEEDLE BX TRACHEA MAIN STEM&/BRON N/A 11/17/2016    Procedure: EBUS; FROZEN SECTION ;  Surgeon: Tiera Phelps MD;  Location: [facility];  Service: Thoracic    [place] [address] N/A 5/24/2017    Procedure: Tish Lawson;  Surgeon: Jhonatan Meyers MD;  Location: [facility];   Service: Pulmonary    TONSILECTOMY AND ADNOIDECTOMY      TOTAL KNEE ARTHROPLASTY Right 09/23/2014       Social History   Social History     Substance and Sexual Activity   Alcohol Use No     Social History     Substance and Sexual Activity   Drug Use No     Social History     Tobacco Use   Smoking Status Former Smoker    Packs/day: 1 00    Years: 50 00  Pack years: 50 00    Types: Cigarettes    Start date:     Quit date:     Years since quittin 7   Smokeless Tobacco Never Used   Tobacco Comment    Quit in      Meds/Allergies     Current Outpatient Medications:     acetaminophen (TYLENOL) 325 mg tablet, Take 650 mg by mouth every 6 (six) hours as needed for mild pain, Disp: , Rfl:     apixaban (Eliquis) 5 mg, TAKE 1 TABLET TWICE A DAY, Disp: 180 tablet, Rfl: 3    Cholecalciferol (VITAMIN D) 2000 units CAPS, Take by mouth, Disp: , Rfl:     cyanocobalamin (VITAMIN B-12) 100 mcg tablet, Take by mouth daily, Disp: , Rfl:     folic acid (FOLVITE) 1 mg tablet, Take 1 tablet (1 mg total) by mouth daily, Disp: 90 tablet, Rfl: 1    furosemide (LASIX) 40 mg tablet, Take 1 tablet (40 mg total) by mouth as needed (leg swelling, fluid overload), Disp: , Rfl:     linaGLIPtin (Tradjenta) 5 MG TABS, Take 5 mg by mouth daily, Disp: 90 tablet, Rfl: 3    losartan (COZAAR) 50 mg tablet, Take 1 tablet (50 mg total) by mouth daily, Disp: 90 tablet, Rfl: 3    metFORMIN (GLUCOPHAGE) 1000 MG tablet, Take 1 tablet (1,000 mg total) by mouth 2 (two) times a day with meals, Disp: 180 tablet, Rfl: 3    metoprolol tartrate (LOPRESSOR) 25 mg tablet, Take 1 tablet (25 mg total) by mouth every 12 (twelve) hours, Disp: 180 tablet, Rfl: 3    omeprazole (PriLOSEC) 20 mg delayed release capsule, Take 1 capsule (20 mg total) by mouth daily, Disp: 90 capsule, Rfl: 3    prochlorperazine (COMPAZINE) 10 mg tablet, Take 1 tablet (10 mg total) by mouth every 6 (six) hours as needed for nausea or vomiting, Disp: 30 tablet, Rfl: 0    sotalol (BETAPACE) 80 mg tablet, Take 1 tablet (80 mg total) by mouth 2 (two) times a day 1 tab PO BID, Disp: 180 tablet, Rfl: 3    venlafaxine (EFFEXOR) 37 5 mg tablet, TAKE 1 TABLET DAILY, Disp: 90 tablet, Rfl: 3  Allergies   Allergen Reactions    Amoxicillin Rash and Hives    Cardizem [Diltiazem] Rash     Rash      Statins Myalgia Severe muscle aching  Terrible pains     Review of Systems   Constitutional: Positive for fatigue (Not new but increased fatigue w/chemo)  HENT: Negative  Eyes:        "Floaters"--no eye follow up   Respiratory: Positive for cough (New onset x one month)  Cardiovascular: Positive for chest pain ("left side of chest x 4wks-sticker pain" per pt  )  Gastrointestinal: Negative  Endocrine: Negative  Genitourinary: Negative  Musculoskeletal: Negative  Skin: Negative  Allergic/Immunologic: Negative  Neurological: Positive for dizziness (Random-not new)  Pt notes "neuropathy in right hand"  And inflammation noted in the hand post reaction w/last chemo  Aug  2021    Hematological: Negative  Psychiatric/Behavioral: Negative        OBJECTIVE:   /75 (BP Location: Right arm, Patient Position: Sitting)   Pulse 74   Temp 97 6 °F (36 4 °C)   Resp 20   Ht 5' 4" (1 626 m)   Wt 86 5 kg (190 lb 9 6 oz)   LMP  (LMP Unknown)   SpO2 95%   BMI 32 72 kg/m²   Pain Assessment:  0  ECOG/Zubrod/WHO: 1 - Symptomatic but completely ambulatory    Physical Exam  Vitals and nursing note reviewed  Constitutional:       General: She is not in acute distress  Appearance: She is well-developed  She is not diaphoretic  HENT:      Head: Normocephalic and atraumatic  Mouth/Throat:      Pharynx: No oropharyngeal exudate  Eyes:      General: No scleral icterus  Conjunctiva/sclera: Conjunctivae normal       Pupils: Pupils are equal, round, and reactive to light  Neck:      Thyroid: No thyromegaly  Trachea: No tracheal deviation  Cardiovascular:      Rate and Rhythm: Normal rate and regular rhythm  Heart sounds: Normal heart sounds  Pulmonary:      Effort: Pulmonary effort is normal  No respiratory distress  Breath sounds: Normal breath sounds  No stridor  No wheezing, rhonchi or rales  Chest:      Chest wall: No tenderness     Abdominal:      General: Bowel sounds are normal  There is no distension  Palpations: Abdomen is soft  There is no mass  Tenderness: There is no abdominal tenderness  Musculoskeletal:         General: No swelling or tenderness  Normal range of motion  Cervical back: Normal range of motion and neck supple  Lymphadenopathy:      Cervical: No cervical adenopathy  Upper Body:      Right upper body: No supraclavicular or axillary adenopathy  Left upper body: No supraclavicular or axillary adenopathy  Skin:     General: Skin is warm and dry  Coloration: Skin is not pale  Findings: No erythema or rash  Neurological:      General: No focal deficit present  Mental Status: She is alert and oriented to person, place, and time  Cranial Nerves: No cranial nerve deficit  Motor: No weakness  Coordination: Coordination normal       Comments: She reports some stable neuropathy in the right hand  Psychiatric:         Mood and Affect: Mood normal          Behavior: Behavior normal          Thought Content:  Thought content normal          Judgment: Judgment normal        RESULTS    Lab Results:   Recent Results (from the past 672 hour(s))   CBC and differential    Collection Time: 09/10/21  8:12 AM   Result Value Ref Range    WBC 1 99 (LL) 4 31 - 10 16 Thousand/uL    RBC 3 24 (L) 3 81 - 5 12 Million/uL    Hemoglobin 9 1 (L) 11 5 - 15 4 g/dL    Hematocrit 29 0 (L) 34 8 - 46 1 %    MCV 90 82 - 98 fL    MCH 28 1 26 8 - 34 3 pg    MCHC 31 4 31 4 - 37 4 g/dL    RDW 14 6 11 6 - 15 1 %    MPV 11 2 8 9 - 12 7 fL    Platelets 382 346 - 589 Thousands/uL    nRBC 0 /100 WBCs    Neutrophils Relative 47 43 - 75 %    Immat GRANS % 2 0 - 2 %    Lymphocytes Relative 17 14 - 44 %    Monocytes Relative 27 (H) 4 - 12 %    Eosinophils Relative 6 0 - 6 %    Basophils Relative 1 0 - 1 %    Neutrophils Absolute 0 96 (L) 1 85 - 7 62 Thousands/µL    Immature Grans Absolute 0 03 0 00 - 0 20 Thousand/uL    Lymphocytes Absolute 0 34 (L) 0 60 - 4 47 Thousands/µL    Monocytes Absolute 0 54 0 17 - 1 22 Thousand/µL    Eosinophils Absolute 0 11 0 00 - 0 61 Thousand/µL    Basophils Absolute 0 01 0 00 - 0 10 Thousands/µL   Comprehensive metabolic panel    Collection Time: 09/10/21  8:12 AM   Result Value Ref Range    Sodium 138 136 - 145 mmol/L    Potassium 4 4 3 5 - 5 3 mmol/L    Chloride 110 (H) 100 - 108 mmol/L    CO2 22 21 - 32 mmol/L    ANION GAP 6 4 - 13 mmol/L    BUN 22 5 - 25 mg/dL    Creatinine 1 71 (H) 0 60 - 1 30 mg/dL    Glucose, Fasting 147 (H) 65 - 99 mg/dL    Calcium 10 3 (H) 8 3 - 10 1 mg/dL    Corrected Calcium 11 5 (H) 8 3 - 10 1 mg/dL    AST 23 5 - 45 U/L    ALT 19 12 - 78 U/L    Alkaline Phosphatase 95 46 - 116 U/L    Total Protein 6 7 6 4 - 8 2 g/dL    Albumin 2 5 (L) 3 5 - 5 0 g/dL    Total Bilirubin 0 34 0 20 - 1 00 mg/dL    eGFR 29 ml/min/1 73sq m     Imaging Studies:No results found  Assessment/Plan:  No orders of the defined types were placed in this encounter  Jasbir Taylor is a 66y o  year old female with a history of a stage IV non-small cell lung cancer diagnosed initially in 2016, with a rather indolent disease course, currently with minimal systemic disease burden and on break from systemic therapy  She was referred to our department for consideration of lung stereotactic radiation  Her most recent imaging revealed a dominant left upper lobe lesion which had been very slowly increasing in size over the past few years with an SUV of 4 7, currently measuring 2 7 cm  This lesion had been present ever since her initial diagnosis  Adjacent and lateral to that lesion, there was a 2nd lesion ground-glass in appearance, also slowly enlarging over the past few years with minimal hypermetabolic activity likely representing a distinct synchronous low-grade neoplasm  She completed definitive stereotactic radiation to both adjacent left upper lobe lesions on 4/21/21  She returns today for her follow up visit      She is doing well from a respiratory standpoint with no significant symptoms  She had a repeat PET-CT study July 14, 2021 that showed post treatment changes in the left upper lobe region  There was some increase uptake in the right upper lobe lesion that has been slowly enlarging which is suspicious  There is no evidence of metastatic disease within the neck, abdomen, nor pelvis  There is persistent increase uptake in the left scapula and adjacent soft tissues related to treated disease  There were no new or suspicious areas for osseous metastasis  Patient seen by Dr Byron Hale in July and started on  Nivolumab, Pemetrexed, and Carboplatin every 3 weeks  She had a reaction with her last infusion August 22, 2021 with hives including redness in her hands and forehead  She has a repeat chest CT scheduled September 23, 2021  She is due to resume her infusion on September 24th  We recommend continuation with her systemic treatment  We do not recommend any additional radiation therapy at this time  If there is further progression in the future within the right lower lobe after chemotherapy, then she can be re-evaluated at that time to consider radiation treatment  He will be seen here on a p r n  basis        Palmira Lawrence MD  9/21/2021,2:43 PM    Portions of the record may have been created with voice recognition software   Occasional wrong word or "sound a like" substitutions may have occurred due to the inherent limitations of voice recognition software   Read the chart carefully and recognize, using context, where substitutions have occurred

## 2021-09-23 ENCOUNTER — HOSPITAL ENCOUNTER (OUTPATIENT)
Dept: CT IMAGING | Facility: HOSPITAL | Age: 71
Discharge: HOME | End: 2021-09-23
Payer: MEDICARE

## 2021-09-23 ENCOUNTER — APPOINTMENT (OUTPATIENT)
Dept: LAB | Facility: AMBULARY SURGERY CENTER | Age: 71
End: 2021-09-23
Payer: MEDICARE

## 2021-09-23 DIAGNOSIS — C34.92 ADENOCARCINOMA OF LEFT LUNG, STAGE 4 (HCC): ICD-10-CM

## 2021-09-23 LAB
ALBUMIN SERPL BCP-MCNC: 2.5 G/DL (ref 3.5–5)
ALP SERPL-CCNC: 95 U/L (ref 46–116)
ALT SERPL W P-5'-P-CCNC: 15 U/L (ref 12–78)
ANION GAP SERPL CALCULATED.3IONS-SCNC: 7 MMOL/L (ref 4–13)
AST SERPL W P-5'-P-CCNC: 19 U/L (ref 5–45)
BASOPHILS # BLD AUTO: 0.08 THOUSANDS/ΜL (ref 0–0.1)
BASOPHILS NFR BLD AUTO: 1 % (ref 0–1)
BILIRUB SERPL-MCNC: 0.49 MG/DL (ref 0.2–1)
BUN SERPL-MCNC: 29 MG/DL (ref 5–25)
CALCIUM ALBUM COR SERPL-MCNC: 12 MG/DL (ref 8.3–10.1)
CALCIUM SERPL-MCNC: 10.8 MG/DL (ref 8.3–10.1)
CHLORIDE SERPL-SCNC: 107 MMOL/L (ref 100–108)
CO2 SERPL-SCNC: 23 MMOL/L (ref 21–32)
CREAT SERPL-MCNC: 1.85 MG/DL (ref 0.6–1.3)
EOSINOPHIL # BLD AUTO: 0.07 THOUSAND/ΜL (ref 0–0.61)
EOSINOPHIL NFR BLD AUTO: 1 % (ref 0–6)
ERYTHROCYTE [DISTWIDTH] IN BLOOD BY AUTOMATED COUNT: 16.5 % (ref 11.6–15.1)
GFR SERPL CREATININE-BSD FRML MDRD: 27 ML/MIN/1.73SQ M
GLUCOSE P FAST SERPL-MCNC: 166 MG/DL (ref 65–99)
HCT VFR BLD AUTO: 30.2 % (ref 34.8–46.1)
HGB BLD-MCNC: 9.4 G/DL (ref 11.5–15.4)
IMM GRANULOCYTES # BLD AUTO: 0.06 THOUSAND/UL (ref 0–0.2)
IMM GRANULOCYTES NFR BLD AUTO: 1 % (ref 0–2)
LYMPHOCYTES # BLD AUTO: 0.36 THOUSANDS/ΜL (ref 0.6–4.47)
LYMPHOCYTES NFR BLD AUTO: 4 % (ref 14–44)
MCH RBC QN AUTO: 28.2 PG (ref 26.8–34.3)
MCHC RBC AUTO-ENTMCNC: 31.1 G/DL (ref 31.4–37.4)
MCV RBC AUTO: 91 FL (ref 82–98)
MONOCYTES # BLD AUTO: 0.55 THOUSAND/ΜL (ref 0.17–1.22)
MONOCYTES NFR BLD AUTO: 7 % (ref 4–12)
NEUTROPHILS # BLD AUTO: 7.32 THOUSANDS/ΜL (ref 1.85–7.62)
NEUTS SEG NFR BLD AUTO: 86 % (ref 43–75)
NRBC BLD AUTO-RTO: 0 /100 WBCS
PLATELET # BLD AUTO: 311 THOUSANDS/UL (ref 149–390)
PMV BLD AUTO: 11.4 FL (ref 8.9–12.7)
POTASSIUM SERPL-SCNC: 4.6 MMOL/L (ref 3.5–5.3)
PROT SERPL-MCNC: 6.9 G/DL (ref 6.4–8.2)
RBC # BLD AUTO: 3.33 MILLION/UL (ref 3.81–5.12)
SODIUM SERPL-SCNC: 137 MMOL/L (ref 136–145)
WBC # BLD AUTO: 8.44 THOUSAND/UL (ref 4.31–10.16)

## 2021-09-23 PROCEDURE — 71250 CT THORAX DX C-: CPT

## 2021-09-23 PROCEDURE — 85025 COMPLETE CBC W/AUTO DIFF WBC: CPT

## 2021-09-23 PROCEDURE — 80053 COMPREHEN METABOLIC PANEL: CPT

## 2021-09-23 PROCEDURE — 36415 COLL VENOUS BLD VENIPUNCTURE: CPT

## 2021-09-24 ENCOUNTER — HOSPITAL ENCOUNTER (OUTPATIENT)
Dept: INFUSION CENTER | Facility: CLINIC | Age: 71
Discharge: HOME | End: 2021-09-24
Payer: MEDICARE

## 2021-09-24 VITALS
BODY MASS INDEX: 32.59 KG/M2 | WEIGHT: 190.92 LBS | HEIGHT: 64 IN | DIASTOLIC BLOOD PRESSURE: 60 MMHG | RESPIRATION RATE: 18 BRPM | SYSTOLIC BLOOD PRESSURE: 122 MMHG | HEART RATE: 80 BPM | OXYGEN SATURATION: 99 % | TEMPERATURE: 97.7 F

## 2021-09-24 DIAGNOSIS — R11.2 CINV (CHEMOTHERAPY-INDUCED NAUSEA AND VOMITING): ICD-10-CM

## 2021-09-24 DIAGNOSIS — C34.92 ADENOCARCINOMA OF LEFT LUNG, STAGE 4 (HCC): Primary | ICD-10-CM

## 2021-09-24 DIAGNOSIS — C41.9 MALIGNANT NEOPLASM OF BONE WITH METASTASES (HCC): ICD-10-CM

## 2021-09-24 DIAGNOSIS — R21 SKIN RASH: ICD-10-CM

## 2021-09-24 DIAGNOSIS — T45.1X5A CINV (CHEMOTHERAPY-INDUCED NAUSEA AND VOMITING): ICD-10-CM

## 2021-09-24 DIAGNOSIS — E83.52 HYPERCALCEMIA: ICD-10-CM

## 2021-09-24 DIAGNOSIS — E83.52 HYPERCALCEMIA: Primary | ICD-10-CM

## 2021-09-24 PROCEDURE — 96413 CHEMO IV INFUSION 1 HR: CPT

## 2021-09-24 PROCEDURE — 96367 TX/PROPH/DG ADDL SEQ IV INF: CPT

## 2021-09-24 PROCEDURE — 96401 CHEMO ANTI-NEOPL SQ/IM: CPT

## 2021-09-24 PROCEDURE — 96417 CHEMO IV INFUS EACH ADDL SEQ: CPT

## 2021-09-24 RX ORDER — CYANOCOBALAMIN 1000 UG/ML
1000 INJECTION INTRAMUSCULAR; SUBCUTANEOUS ONCE
Status: DISCONTINUED | OUTPATIENT
Start: 2021-09-24 | End: 2021-09-27 | Stop reason: HOSPADM

## 2021-09-24 RX ORDER — SODIUM CHLORIDE 9 MG/ML
20 INJECTION, SOLUTION INTRAVENOUS ONCE
Status: COMPLETED | OUTPATIENT
Start: 2021-09-24 | End: 2021-09-24

## 2021-09-24 RX ADMIN — FOSAPREPITANT 150 MG: 150 INJECTION, POWDER, LYOPHILIZED, FOR SOLUTION INTRAVENOUS at 13:21

## 2021-09-24 RX ADMIN — DEXAMETHASONE SODIUM PHOSPHATE: 10 INJECTION, SOLUTION INTRAMUSCULAR; INTRAVENOUS at 12:30

## 2021-09-24 RX ADMIN — DIPHENHYDRAMINE HYDROCHLORIDE 50 MG: 50 INJECTION, SOLUTION INTRAMUSCULAR; INTRAVENOUS at 12:54

## 2021-09-24 RX ADMIN — SODIUM CHLORIDE 240 MG: 9 INJECTION, SOLUTION INTRAVENOUS at 14:01

## 2021-09-24 RX ADMIN — CARBOPLATIN 273.5 MG: 10 INJECTION, SOLUTION INTRAVENOUS at 14:43

## 2021-09-24 RX ADMIN — DENOSUMAB 120 MG: 120 INJECTION SUBCUTANEOUS at 15:47

## 2021-09-24 RX ADMIN — SODIUM CHLORIDE 20 ML/HR: 0.9 INJECTION, SOLUTION INTRAVENOUS at 12:31

## 2021-09-24 NOTE — PROGRESS NOTES
Pt presented for chemotherapy offering no complaints  Reviewed patient labs from 9/23/21, noted worsening creat/bun and hypercalcemia, reached out to [address], per [facility], will hold pts Alimta and B12 and pt will to receive Xgeva today  Pt aware of the plan and agreeable  Pt tolerated entire treatment without incident  PIV removed, pt was given AVS and discharged in stable condition

## 2021-09-24 NOTE — PROGRESS NOTES
Patient with worsening renal function and hypercalcemia  Will hold Alimta  Give Nathaly Light today  Will assess vitamin-D level as well as iPTH and parathyroid hormone related protein today  Encourage hydration  Repeat BMP early next week    Order placed for Tuesday

## 2021-09-30 PROBLEM — T45.1X5A CHEMOTHERAPY INDUCED NEUTROPENIA (HCC): Status: ACTIVE | Noted: 2021-09-30

## 2021-09-30 PROBLEM — D70.1 CHEMOTHERAPY INDUCED NEUTROPENIA (HCC): Status: ACTIVE | Noted: 2021-09-30

## 2021-10-01 ENCOUNTER — OFFICE VISIT (OUTPATIENT)
Dept: HEMATOLOGY ONCOLOGY | Facility: CLINIC | Age: 71
End: 2021-10-01
Payer: MEDICARE

## 2021-10-01 VITALS
HEART RATE: 68 BPM | SYSTOLIC BLOOD PRESSURE: 130 MMHG | DIASTOLIC BLOOD PRESSURE: 60 MMHG | HEIGHT: 64 IN | BODY MASS INDEX: 32.61 KG/M2 | WEIGHT: 191 LBS | TEMPERATURE: 97.2 F | OXYGEN SATURATION: 96 % | RESPIRATION RATE: 17 BRPM

## 2021-10-01 DIAGNOSIS — D70.1 CHEMOTHERAPY INDUCED NEUTROPENIA (HCC): ICD-10-CM

## 2021-10-01 DIAGNOSIS — R11.2 CINV (CHEMOTHERAPY-INDUCED NAUSEA AND VOMITING): ICD-10-CM

## 2021-10-01 DIAGNOSIS — T45.1X5A CHEMOTHERAPY INDUCED NEUTROPENIA (HCC): ICD-10-CM

## 2021-10-01 DIAGNOSIS — C34.90 MALIGNANT NEOPLASM OF LUNG, UNSPECIFIED LATERALITY, UNSPECIFIED PART OF LUNG (HCC): Primary | ICD-10-CM

## 2021-10-01 DIAGNOSIS — C41.9 MALIGNANT NEOPLASM OF BONE WITH METASTASES (HCC): ICD-10-CM

## 2021-10-01 DIAGNOSIS — C34.92 ADENOCARCINOMA OF LEFT LUNG, STAGE 4 (HCC): ICD-10-CM

## 2021-10-01 DIAGNOSIS — T45.1X5A CINV (CHEMOTHERAPY-INDUCED NAUSEA AND VOMITING): ICD-10-CM

## 2021-10-01 DIAGNOSIS — E83.52 HYPERCALCEMIA: ICD-10-CM

## 2021-10-01 PROBLEM — I87.8 POOR VENOUS ACCESS: Status: ACTIVE | Noted: 2021-10-01

## 2021-10-01 PROCEDURE — 99215 OFFICE O/P EST HI 40 MIN: CPT | Performed by: PHYSICIAN ASSISTANT

## 2021-10-01 RX ORDER — SODIUM CHLORIDE 9 MG/ML
20 INJECTION, SOLUTION INTRAVENOUS ONCE
Status: CANCELLED | OUTPATIENT
Start: 2021-10-15

## 2021-10-01 RX ORDER — DEXAMETHASONE 4 MG/1
8 TABLET ORAL 2 TIMES DAILY WITH MEALS
Qty: 12 TABLET | Refills: 6 | Status: SHIPPED | OUTPATIENT
Start: 2021-10-01 | End: 2021-10-04

## 2021-10-07 ENCOUNTER — TELEPHONE (OUTPATIENT)
Dept: HEMATOLOGY ONCOLOGY | Facility: CLINIC | Age: 71
End: 2021-10-07

## 2021-10-13 ENCOUNTER — APPOINTMENT (OUTPATIENT)
Dept: LAB | Facility: AMBULARY SURGERY CENTER | Age: 71
End: 2021-10-13
Payer: MEDICARE

## 2021-10-13 ENCOUNTER — OFFICE VISIT (OUTPATIENT)
Dept: PALLIATIVE MEDICINE | Facility: CLINIC | Age: 71
End: 2021-10-13
Payer: MEDICARE

## 2021-10-13 ENCOUNTER — PATIENT OUTREACH (OUTPATIENT)
Dept: HEMATOLOGY ONCOLOGY | Facility: CLINIC | Age: 71
End: 2021-10-13

## 2021-10-13 VITALS
BODY MASS INDEX: 32.1 KG/M2 | SYSTOLIC BLOOD PRESSURE: 132 MMHG | HEART RATE: 85 BPM | OXYGEN SATURATION: 92 % | RESPIRATION RATE: 18 BRPM | DIASTOLIC BLOOD PRESSURE: 53 MMHG | TEMPERATURE: 96.1 F | WEIGHT: 187 LBS

## 2021-10-13 DIAGNOSIS — T45.1X5A CHEMOTHERAPY INDUCED NEUTROPENIA (HCC): ICD-10-CM

## 2021-10-13 DIAGNOSIS — I50.32 CHRONIC DIASTOLIC HEART FAILURE (HCC): ICD-10-CM

## 2021-10-13 DIAGNOSIS — C79.51 SECONDARY MALIGNANT NEOPLASM OF BONE AND BONE MARROW (HCC): ICD-10-CM

## 2021-10-13 DIAGNOSIS — C34.92 ADENOCARCINOMA OF LEFT LUNG, STAGE 4 (HCC): ICD-10-CM

## 2021-10-13 DIAGNOSIS — C79.52 SECONDARY MALIGNANT NEOPLASM OF BONE AND BONE MARROW (HCC): ICD-10-CM

## 2021-10-13 DIAGNOSIS — D70.1 CHEMOTHERAPY INDUCED NEUTROPENIA (HCC): ICD-10-CM

## 2021-10-13 DIAGNOSIS — T45.1X5A CINV (CHEMOTHERAPY-INDUCED NAUSEA AND VOMITING): ICD-10-CM

## 2021-10-13 DIAGNOSIS — Z51.5 PALLIATIVE CARE PATIENT: ICD-10-CM

## 2021-10-13 DIAGNOSIS — F41.9 ANXIOUSNESS: ICD-10-CM

## 2021-10-13 DIAGNOSIS — G47.00 INSOMNIA, UNSPECIFIED TYPE: ICD-10-CM

## 2021-10-13 DIAGNOSIS — G89.3 CANCER RELATED PAIN: Chronic | ICD-10-CM

## 2021-10-13 DIAGNOSIS — R11.2 CINV (CHEMOTHERAPY-INDUCED NAUSEA AND VOMITING): ICD-10-CM

## 2021-10-13 DIAGNOSIS — E11.9 TYPE 2 DIABETES MELLITUS WITHOUT COMPLICATION, WITHOUT LONG-TERM CURRENT USE OF INSULIN (HCC): ICD-10-CM

## 2021-10-13 DIAGNOSIS — K21.9 GASTROESOPHAGEAL REFLUX DISEASE WITHOUT ESOPHAGITIS: ICD-10-CM

## 2021-10-13 DIAGNOSIS — F41.9 ANXIETY: ICD-10-CM

## 2021-10-13 DIAGNOSIS — C34.92 ADENOCARCINOMA OF LEFT LUNG, STAGE 4 (HCC): Primary | ICD-10-CM

## 2021-10-13 DIAGNOSIS — E83.52 HYPERCALCEMIA: ICD-10-CM

## 2021-10-13 DIAGNOSIS — C41.9 MALIGNANT NEOPLASM OF BONE WITH METASTASES (HCC): ICD-10-CM

## 2021-10-13 DIAGNOSIS — N18.32 STAGE 3B CHRONIC KIDNEY DISEASE (HCC): ICD-10-CM

## 2021-10-13 DIAGNOSIS — F32.5 MAJOR DEPRESSIVE DISORDER WITH SINGLE EPISODE, IN FULL REMISSION (HCC): ICD-10-CM

## 2021-10-13 DIAGNOSIS — R53.83 OTHER FATIGUE: ICD-10-CM

## 2021-10-13 LAB
25(OH)D3 SERPL-MCNC: 96 NG/ML (ref 30–100)
ALBUMIN SERPL BCP-MCNC: 2.6 G/DL (ref 3.5–5)
ALP SERPL-CCNC: 97 U/L (ref 46–116)
ALT SERPL W P-5'-P-CCNC: 13 U/L (ref 12–78)
ANION GAP SERPL CALCULATED.3IONS-SCNC: 3 MMOL/L (ref 4–13)
AST SERPL W P-5'-P-CCNC: 14 U/L (ref 5–45)
BACTERIA UR QL AUTO: ABNORMAL /HPF
BASOPHILS # BLD AUTO: 0.03 THOUSANDS/ΜL (ref 0–0.1)
BASOPHILS NFR BLD AUTO: 1 % (ref 0–1)
BILIRUB SERPL-MCNC: 0.51 MG/DL (ref 0.2–1)
BILIRUB UR QL STRIP: NEGATIVE
BUN SERPL-MCNC: 19 MG/DL (ref 5–25)
CALCIUM ALBUM COR SERPL-MCNC: 10.5 MG/DL (ref 8.3–10.1)
CALCIUM SERPL-MCNC: 9.4 MG/DL (ref 8.3–10.1)
CHLORIDE SERPL-SCNC: 109 MMOL/L (ref 100–108)
CLARITY UR: ABNORMAL
CO2 SERPL-SCNC: 24 MMOL/L (ref 21–32)
COLOR UR: YELLOW
CREAT SERPL-MCNC: 1.64 MG/DL (ref 0.6–1.3)
EOSINOPHIL # BLD AUTO: 0.07 THOUSAND/ΜL (ref 0–0.61)
EOSINOPHIL NFR BLD AUTO: 2 % (ref 0–6)
ERYTHROCYTE [DISTWIDTH] IN BLOOD BY AUTOMATED COUNT: 16.3 % (ref 11.6–15.1)
EST. AVERAGE GLUCOSE BLD GHB EST-MCNC: 148 MG/DL
GFR SERPL CREATININE-BSD FRML MDRD: 31 ML/MIN/1.73SQ M
GLUCOSE P FAST SERPL-MCNC: 137 MG/DL (ref 65–99)
GLUCOSE UR STRIP-MCNC: ABNORMAL MG/DL
HBA1C MFR BLD: 6.8 %
HCT VFR BLD AUTO: 29.6 % (ref 34.8–46.1)
HGB BLD-MCNC: 9.2 G/DL (ref 11.5–15.4)
HGB UR QL STRIP.AUTO: ABNORMAL
IMM GRANULOCYTES # BLD AUTO: 0.02 THOUSAND/UL (ref 0–0.2)
IMM GRANULOCYTES NFR BLD AUTO: 1 % (ref 0–2)
KETONES UR STRIP-MCNC: NEGATIVE MG/DL
LEUKOCYTE ESTERASE UR QL STRIP: ABNORMAL
LYMPHOCYTES # BLD AUTO: 0.38 THOUSANDS/ΜL (ref 0.6–4.47)
LYMPHOCYTES NFR BLD AUTO: 9 % (ref 14–44)
MCH RBC QN AUTO: 28.1 PG (ref 26.8–34.3)
MCHC RBC AUTO-ENTMCNC: 31.1 G/DL (ref 31.4–37.4)
MCV RBC AUTO: 91 FL (ref 82–98)
MONOCYTES # BLD AUTO: 0.43 THOUSAND/ΜL (ref 0.17–1.22)
MONOCYTES NFR BLD AUTO: 10 % (ref 4–12)
NEUTROPHILS # BLD AUTO: 3.3 THOUSANDS/ΜL (ref 1.85–7.62)
NEUTS SEG NFR BLD AUTO: 77 % (ref 43–75)
NITRITE UR QL STRIP: NEGATIVE
NON-SQ EPI CELLS URNS QL MICRO: ABNORMAL /HPF
NRBC BLD AUTO-RTO: 0 /100 WBCS
PH UR STRIP.AUTO: 6 [PH]
PLATELET # BLD AUTO: 204 THOUSANDS/UL (ref 149–390)
PMV BLD AUTO: 11.1 FL (ref 8.9–12.7)
POTASSIUM SERPL-SCNC: 4.4 MMOL/L (ref 3.5–5.3)
PROT SERPL-MCNC: 6.8 G/DL (ref 6.4–8.2)
PROT UR STRIP-MCNC: ABNORMAL MG/DL
PTH-INTACT SERPL-MCNC: 59.6 PG/ML (ref 18.4–80.1)
RBC # BLD AUTO: 3.27 MILLION/UL (ref 3.81–5.12)
RBC #/AREA URNS AUTO: ABNORMAL /HPF
SODIUM SERPL-SCNC: 136 MMOL/L (ref 136–145)
SP GR UR STRIP.AUTO: 1.02 (ref 1–1.03)
UROBILINOGEN UR QL STRIP.AUTO: 0.2 E.U./DL
WBC # BLD AUTO: 4.23 THOUSAND/UL (ref 4.31–10.16)
WBC #/AREA URNS AUTO: ABNORMAL /HPF

## 2021-10-13 PROCEDURE — 81001 URINALYSIS AUTO W/SCOPE: CPT

## 2021-10-13 PROCEDURE — 83036 HEMOGLOBIN GLYCOSYLATED A1C: CPT

## 2021-10-13 PROCEDURE — 83970 ASSAY OF PARATHORMONE: CPT

## 2021-10-13 PROCEDURE — 85025 COMPLETE CBC W/AUTO DIFF WBC: CPT

## 2021-10-13 PROCEDURE — 36415 COLL VENOUS BLD VENIPUNCTURE: CPT

## 2021-10-13 PROCEDURE — 80053 COMPREHEN METABOLIC PANEL: CPT

## 2021-10-13 PROCEDURE — 99214 OFFICE O/P EST MOD 30 MIN: CPT | Performed by: INTERNAL MEDICINE

## 2021-10-13 PROCEDURE — 82306 VITAMIN D 25 HYDROXY: CPT

## 2021-10-13 PROCEDURE — 82397 CHEMILUMINESCENT ASSAY: CPT

## 2021-10-13 RX ORDER — VENLAFAXINE 37.5 MG/1
37.5 TABLET ORAL DAILY
Qty: 90 TABLET | Refills: 3 | Status: SHIPPED | OUTPATIENT
Start: 2021-10-13

## 2021-10-15 ENCOUNTER — HOSPITAL ENCOUNTER (OUTPATIENT)
Dept: INFUSION CENTER | Facility: CLINIC | Age: 71
Discharge: HOME | End: 2021-10-15
Payer: MEDICARE

## 2021-10-15 VITALS
SYSTOLIC BLOOD PRESSURE: 144 MMHG | HEART RATE: 69 BPM | HEIGHT: 64 IN | WEIGHT: 185.85 LBS | RESPIRATION RATE: 18 BRPM | BODY MASS INDEX: 31.73 KG/M2 | DIASTOLIC BLOOD PRESSURE: 65 MMHG | TEMPERATURE: 97.5 F

## 2021-10-15 DIAGNOSIS — T45.1X5A CHEMOTHERAPY INDUCED NEUTROPENIA (HCC): ICD-10-CM

## 2021-10-15 DIAGNOSIS — C34.92 ADENOCARCINOMA OF LEFT LUNG, STAGE 4 (HCC): ICD-10-CM

## 2021-10-15 DIAGNOSIS — R11.2 CINV (CHEMOTHERAPY-INDUCED NAUSEA AND VOMITING): ICD-10-CM

## 2021-10-15 DIAGNOSIS — C41.9 MALIGNANT NEOPLASM OF BONE WITH METASTASES (HCC): ICD-10-CM

## 2021-10-15 DIAGNOSIS — E83.52 HYPERCALCEMIA: Primary | ICD-10-CM

## 2021-10-15 DIAGNOSIS — D70.1 CHEMOTHERAPY INDUCED NEUTROPENIA (HCC): ICD-10-CM

## 2021-10-15 DIAGNOSIS — T45.1X5A CINV (CHEMOTHERAPY-INDUCED NAUSEA AND VOMITING): ICD-10-CM

## 2021-10-15 PROCEDURE — 96413 CHEMO IV INFUSION 1 HR: CPT

## 2021-10-15 PROCEDURE — 96367 TX/PROPH/DG ADDL SEQ IV INF: CPT

## 2021-10-15 PROCEDURE — 96377 APPLICATON ON-BODY INJECTOR: CPT

## 2021-10-15 RX ORDER — SODIUM CHLORIDE 9 MG/ML
20 INJECTION, SOLUTION INTRAVENOUS ONCE
Status: COMPLETED | OUTPATIENT
Start: 2021-10-15 | End: 2021-10-15

## 2021-10-15 RX ADMIN — FOSAPREPITANT 150 MG: 150 INJECTION, POWDER, LYOPHILIZED, FOR SOLUTION INTRAVENOUS at 10:09

## 2021-10-15 RX ADMIN — SODIUM CHLORIDE 20 ML/HR: 0.9 INJECTION, SOLUTION INTRAVENOUS at 09:17

## 2021-10-15 RX ADMIN — DOCETAXEL 144 MG: 20 INJECTION, SOLUTION INTRAVENOUS at 10:57

## 2021-10-15 RX ADMIN — DIPHENHYDRAMINE HYDROCHLORIDE 50 MG: 50 INJECTION, SOLUTION INTRAMUSCULAR; INTRAVENOUS at 09:22

## 2021-10-15 RX ADMIN — PEGFILGRASTIM 6 MG: KIT SUBCUTANEOUS at 12:02

## 2021-10-16 ENCOUNTER — TELEPHONE (OUTPATIENT)
Dept: OTHER | Facility: OTHER | Age: 71
End: 2021-10-16

## 2021-10-16 ENCOUNTER — TELEPHONE (OUTPATIENT)
Dept: OTHER | Facility: HOSPITAL | Age: 71
End: 2021-10-16

## 2021-10-18 ENCOUNTER — PATIENT OUTREACH (OUTPATIENT)
Dept: HEMATOLOGY ONCOLOGY | Facility: CLINIC | Age: 71
End: 2021-10-18

## 2021-10-18 ENCOUNTER — OFFICE VISIT (OUTPATIENT)
Dept: FAMILY MEDICINE CLINIC | Facility: CLINIC | Age: 71
End: 2021-10-18
Payer: MEDICARE

## 2021-10-18 ENCOUNTER — TELEPHONE (OUTPATIENT)
Dept: HEMATOLOGY ONCOLOGY | Facility: CLINIC | Age: 71
End: 2021-10-18

## 2021-10-18 VITALS
OXYGEN SATURATION: 94 % | DIASTOLIC BLOOD PRESSURE: 60 MMHG | SYSTOLIC BLOOD PRESSURE: 120 MMHG | TEMPERATURE: 97 F | HEIGHT: 64 IN | RESPIRATION RATE: 16 BRPM | WEIGHT: 184.2 LBS | HEART RATE: 79 BPM | BODY MASS INDEX: 31.45 KG/M2

## 2021-10-18 DIAGNOSIS — E11.9 TYPE 2 DIABETES MELLITUS WITHOUT COMPLICATION, WITHOUT LONG-TERM CURRENT USE OF INSULIN (HCC): Primary | ICD-10-CM

## 2021-10-18 DIAGNOSIS — D70.1 CHEMOTHERAPY INDUCED NEUTROPENIA (HCC): ICD-10-CM

## 2021-10-18 DIAGNOSIS — T45.1X5A CHEMOTHERAPY INDUCED NEUTROPENIA (HCC): ICD-10-CM

## 2021-10-18 DIAGNOSIS — I12.9 BENIGN HYPERTENSION WITH CKD (CHRONIC KIDNEY DISEASE) STAGE III (HCC): ICD-10-CM

## 2021-10-18 DIAGNOSIS — E78.2 MIXED HYPERLIPIDEMIA: ICD-10-CM

## 2021-10-18 DIAGNOSIS — N18.30 BENIGN HYPERTENSION WITH CKD (CHRONIC KIDNEY DISEASE) STAGE III (HCC): ICD-10-CM

## 2021-10-18 DIAGNOSIS — C34.92 ADENOCARCINOMA OF LEFT LUNG, STAGE 4 (HCC): ICD-10-CM

## 2021-10-18 PROCEDURE — 99214 OFFICE O/P EST MOD 30 MIN: CPT | Performed by: FAMILY MEDICINE

## 2021-10-19 ENCOUNTER — APPOINTMENT (OUTPATIENT)
Dept: LAB | Facility: AMBULARY SURGERY CENTER | Age: 71
End: 2021-10-19
Payer: MEDICARE

## 2021-10-19 DIAGNOSIS — R19.7 DIARRHEA, UNSPECIFIED TYPE: ICD-10-CM

## 2021-10-19 PROCEDURE — 87493 C DIFF AMPLIFIED PROBE: CPT

## 2021-10-20 ENCOUNTER — TELEPHONE (OUTPATIENT)
Dept: HEMATOLOGY ONCOLOGY | Facility: CLINIC | Age: 71
End: 2021-10-20

## 2021-10-20 DIAGNOSIS — A04.72 C. DIFFICILE DIARRHEA: Primary | ICD-10-CM

## 2021-10-20 LAB
C DIFF TOX A+B STL QL IA: NEGATIVE
C DIFF TOX GENS STL QL NAA+PROBE: POSITIVE
PTH RELATED PROT SERPL-SCNC: <2 PMOL/L

## 2021-10-20 RX ORDER — METRONIDAZOLE 500 MG/1
500 TABLET ORAL EVERY 8 HOURS SCHEDULED
Qty: 30 TABLET | Refills: 0 | Status: SHIPPED | OUTPATIENT
Start: 2021-10-20 | End: 2021-10-30

## 2021-10-25 ENCOUNTER — PATIENT OUTREACH (OUTPATIENT)
Dept: HEMATOLOGY ONCOLOGY | Facility: CLINIC | Age: 71
End: 2021-10-25

## 2021-10-29 ENCOUNTER — TELEPHONE (OUTPATIENT)
Dept: SURGICAL ONCOLOGY | Facility: CLINIC | Age: 71
End: 2021-10-29

## 2021-10-31 ENCOUNTER — TELEPHONE (OUTPATIENT)
Dept: OTHER | Facility: OTHER | Age: 71
End: 2021-10-31

## 2021-10-31 DIAGNOSIS — F41.9 ANXIETY: ICD-10-CM

## 2021-10-31 DIAGNOSIS — F32.5 MAJOR DEPRESSIVE DISORDER WITH SINGLE EPISODE, IN FULL REMISSION (HCC): Primary | ICD-10-CM

## 2021-10-31 DIAGNOSIS — Z51.5 PALLIATIVE CARE PATIENT: ICD-10-CM

## 2021-11-01 ENCOUNTER — HOSPITAL ENCOUNTER (OUTPATIENT)
Dept: MRI IMAGING | Facility: HOSPITAL | Age: 71
Discharge: HOME | End: 2021-11-01
Payer: MEDICARE

## 2021-11-01 ENCOUNTER — PATIENT OUTREACH (OUTPATIENT)
Dept: HEMATOLOGY ONCOLOGY | Facility: CLINIC | Age: 71
End: 2021-11-01

## 2021-11-01 DIAGNOSIS — C34.90 MALIGNANT NEOPLASM OF LUNG, UNSPECIFIED LATERALITY, UNSPECIFIED PART OF LUNG (HCC): ICD-10-CM

## 2021-11-01 PROCEDURE — 70551 MRI BRAIN STEM W/O DYE: CPT

## 2021-11-01 RX ORDER — BUSPIRONE HYDROCHLORIDE 5 MG/1
5 TABLET ORAL EVERY 12 HOURS SCHEDULED
Qty: 60 TABLET | Refills: 0 | Status: SHIPPED | OUTPATIENT
Start: 2021-11-01 | End: 2021-11-24 | Stop reason: SDUPTHER

## 2021-11-02 ENCOUNTER — APPOINTMENT (OUTPATIENT)
Dept: LAB | Facility: AMBULARY SURGERY CENTER | Age: 71
End: 2021-11-02
Payer: MEDICARE

## 2021-11-02 DIAGNOSIS — D70.1 CHEMOTHERAPY INDUCED NEUTROPENIA (HCC): ICD-10-CM

## 2021-11-02 DIAGNOSIS — T45.1X5A CINV (CHEMOTHERAPY-INDUCED NAUSEA AND VOMITING): ICD-10-CM

## 2021-11-02 DIAGNOSIS — E83.52 HYPERCALCEMIA: ICD-10-CM

## 2021-11-02 DIAGNOSIS — T45.1X5A CHEMOTHERAPY INDUCED NEUTROPENIA (HCC): ICD-10-CM

## 2021-11-02 DIAGNOSIS — C41.9 MALIGNANT NEOPLASM OF BONE WITH METASTASES (HCC): ICD-10-CM

## 2021-11-02 DIAGNOSIS — R11.2 CINV (CHEMOTHERAPY-INDUCED NAUSEA AND VOMITING): ICD-10-CM

## 2021-11-02 DIAGNOSIS — C34.92 ADENOCARCINOMA OF LEFT LUNG, STAGE 4 (HCC): ICD-10-CM

## 2021-11-02 LAB
ALBUMIN SERPL BCP-MCNC: 2.7 G/DL (ref 3.5–5)
ALP SERPL-CCNC: 82 U/L (ref 46–116)
ALT SERPL W P-5'-P-CCNC: 14 U/L (ref 12–78)
ANION GAP SERPL CALCULATED.3IONS-SCNC: 7 MMOL/L (ref 4–13)
AST SERPL W P-5'-P-CCNC: 18 U/L (ref 5–45)
BASOPHILS # BLD AUTO: 0.07 THOUSANDS/ΜL (ref 0–0.1)
BASOPHILS NFR BLD AUTO: 1 % (ref 0–1)
BILIRUB SERPL-MCNC: 0.4 MG/DL (ref 0.2–1)
BUN SERPL-MCNC: 16 MG/DL (ref 5–25)
CALCIUM ALBUM COR SERPL-MCNC: 9.7 MG/DL (ref 8.3–10.1)
CALCIUM SERPL-MCNC: 8.7 MG/DL (ref 8.3–10.1)
CHLORIDE SERPL-SCNC: 113 MMOL/L (ref 100–108)
CO2 SERPL-SCNC: 20 MMOL/L (ref 21–32)
CREAT SERPL-MCNC: 1.77 MG/DL (ref 0.6–1.3)
EOSINOPHIL # BLD AUTO: 0.01 THOUSAND/ΜL (ref 0–0.61)
EOSINOPHIL NFR BLD AUTO: 0 % (ref 0–6)
ERYTHROCYTE [DISTWIDTH] IN BLOOD BY AUTOMATED COUNT: 19.7 % (ref 11.6–15.1)
GFR SERPL CREATININE-BSD FRML MDRD: 28 ML/MIN/1.73SQ M
GLUCOSE P FAST SERPL-MCNC: 162 MG/DL (ref 65–99)
HCT VFR BLD AUTO: 35.5 % (ref 34.8–46.1)
HGB BLD-MCNC: 10.7 G/DL (ref 11.5–15.4)
IMM GRANULOCYTES # BLD AUTO: 0.05 THOUSAND/UL (ref 0–0.2)
IMM GRANULOCYTES NFR BLD AUTO: 1 % (ref 0–2)
LYMPHOCYTES # BLD AUTO: 0.55 THOUSANDS/ΜL (ref 0.6–4.47)
LYMPHOCYTES NFR BLD AUTO: 9 % (ref 14–44)
MCH RBC QN AUTO: 28.5 PG (ref 26.8–34.3)
MCHC RBC AUTO-ENTMCNC: 30.1 G/DL (ref 31.4–37.4)
MCV RBC AUTO: 95 FL (ref 82–98)
MONOCYTES # BLD AUTO: 0.6 THOUSAND/ΜL (ref 0.17–1.22)
MONOCYTES NFR BLD AUTO: 10 % (ref 4–12)
NEUTROPHILS # BLD AUTO: 4.68 THOUSANDS/ΜL (ref 1.85–7.62)
NEUTS SEG NFR BLD AUTO: 79 % (ref 43–75)
NRBC BLD AUTO-RTO: 0 /100 WBCS
PLATELET # BLD AUTO: 375 THOUSANDS/UL (ref 149–390)
PMV BLD AUTO: 10.8 FL (ref 8.9–12.7)
POTASSIUM SERPL-SCNC: 3.9 MMOL/L (ref 3.5–5.3)
PROT SERPL-MCNC: 6.3 G/DL (ref 6.4–8.2)
RBC # BLD AUTO: 3.75 MILLION/UL (ref 3.81–5.12)
SODIUM SERPL-SCNC: 140 MMOL/L (ref 136–145)
WBC # BLD AUTO: 5.96 THOUSAND/UL (ref 4.31–10.16)

## 2021-11-02 PROCEDURE — 85025 COMPLETE CBC W/AUTO DIFF WBC: CPT

## 2021-11-02 PROCEDURE — 36415 COLL VENOUS BLD VENIPUNCTURE: CPT

## 2021-11-02 PROCEDURE — 80053 COMPREHEN METABOLIC PANEL: CPT

## 2021-11-04 ENCOUNTER — PATIENT OUTREACH (OUTPATIENT)
Dept: HEMATOLOGY ONCOLOGY | Facility: CLINIC | Age: 71
End: 2021-11-04

## 2021-11-05 ENCOUNTER — HOSPITAL ENCOUNTER (OUTPATIENT)
Dept: INFUSION CENTER | Facility: CLINIC | Age: 71
Discharge: HOME | End: 2021-11-05
Payer: MEDICARE

## 2021-11-05 VITALS
TEMPERATURE: 97.9 F | RESPIRATION RATE: 18 BRPM | SYSTOLIC BLOOD PRESSURE: 112 MMHG | DIASTOLIC BLOOD PRESSURE: 54 MMHG | HEART RATE: 68 BPM | OXYGEN SATURATION: 98 % | BODY MASS INDEX: 30.9 KG/M2 | HEIGHT: 64 IN | WEIGHT: 181 LBS

## 2021-11-05 DIAGNOSIS — T45.1X5A CINV (CHEMOTHERAPY-INDUCED NAUSEA AND VOMITING): ICD-10-CM

## 2021-11-05 DIAGNOSIS — E83.52 HYPERCALCEMIA: Primary | ICD-10-CM

## 2021-11-05 DIAGNOSIS — D70.1 CHEMOTHERAPY INDUCED NEUTROPENIA (HCC): ICD-10-CM

## 2021-11-05 DIAGNOSIS — C41.9 MALIGNANT NEOPLASM OF BONE WITH METASTASES (HCC): ICD-10-CM

## 2021-11-05 DIAGNOSIS — C34.92 ADENOCARCINOMA OF LEFT LUNG, STAGE 4 (HCC): ICD-10-CM

## 2021-11-05 DIAGNOSIS — R11.2 CINV (CHEMOTHERAPY-INDUCED NAUSEA AND VOMITING): ICD-10-CM

## 2021-11-05 DIAGNOSIS — T45.1X5A CHEMOTHERAPY INDUCED NEUTROPENIA (HCC): ICD-10-CM

## 2021-11-05 PROCEDURE — 96413 CHEMO IV INFUSION 1 HR: CPT

## 2021-11-05 PROCEDURE — 96367 TX/PROPH/DG ADDL SEQ IV INF: CPT

## 2021-11-05 PROCEDURE — 96377 APPLICATON ON-BODY INJECTOR: CPT

## 2021-11-05 PROCEDURE — 96417 CHEMO IV INFUS EACH ADDL SEQ: CPT

## 2021-11-05 RX ORDER — SODIUM CHLORIDE 9 MG/ML
20 INJECTION, SOLUTION INTRAVENOUS ONCE
Status: COMPLETED | OUTPATIENT
Start: 2021-11-05 | End: 2021-11-05

## 2021-11-05 RX ADMIN — DOCETAXEL 144 MG: 20 INJECTION, SOLUTION, CONCENTRATE INTRAVENOUS at 12:00

## 2021-11-05 RX ADMIN — SODIUM CHLORIDE 866 MG: 0.9 INJECTION, SOLUTION INTRAVENOUS at 10:54

## 2021-11-05 RX ADMIN — FOSAPREPITANT 150 MG: 150 INJECTION, POWDER, LYOPHILIZED, FOR SOLUTION INTRAVENOUS at 10:00

## 2021-11-05 RX ADMIN — SODIUM CHLORIDE 20 ML/HR: 0.9 INJECTION, SOLUTION INTRAVENOUS at 09:27

## 2021-11-05 RX ADMIN — DIPHENHYDRAMINE HYDROCHLORIDE 50 MG: 50 INJECTION, SOLUTION INTRAMUSCULAR; INTRAVENOUS at 09:37

## 2021-11-05 RX ADMIN — PEGFILGRASTIM 6 MG: KIT SUBCUTANEOUS at 13:18

## 2021-11-08 ENCOUNTER — OFFICE VISIT (OUTPATIENT)
Dept: HEMATOLOGY ONCOLOGY | Facility: CLINIC | Age: 71
End: 2021-11-08
Payer: MEDICARE

## 2021-11-08 VITALS
DIASTOLIC BLOOD PRESSURE: 70 MMHG | HEART RATE: 64 BPM | SYSTOLIC BLOOD PRESSURE: 152 MMHG | BODY MASS INDEX: 31.16 KG/M2 | OXYGEN SATURATION: 96 % | WEIGHT: 182.5 LBS | TEMPERATURE: 97.9 F | HEIGHT: 64 IN | RESPIRATION RATE: 16 BRPM

## 2021-11-08 DIAGNOSIS — C41.9 MALIGNANT NEOPLASM OF BONE WITH METASTASES (HCC): Primary | ICD-10-CM

## 2021-11-08 DIAGNOSIS — C34.91 MALIGNANT NEOPLASM OF UNSPECIFIED PART OF RIGHT BRONCHUS OR LUNG (HCC): ICD-10-CM

## 2021-11-08 DIAGNOSIS — T45.1X5A CHEMOTHERAPY INDUCED NEUTROPENIA (HCC): ICD-10-CM

## 2021-11-08 DIAGNOSIS — N18.32 ANEMIA IN STAGE 3B CHRONIC KIDNEY DISEASE (HCC): ICD-10-CM

## 2021-11-08 DIAGNOSIS — D70.1 CHEMOTHERAPY INDUCED NEUTROPENIA (HCC): ICD-10-CM

## 2021-11-08 DIAGNOSIS — C34.92 ADENOCARCINOMA OF LEFT LUNG, STAGE 4 (HCC): ICD-10-CM

## 2021-11-08 DIAGNOSIS — D63.1 ANEMIA IN STAGE 3B CHRONIC KIDNEY DISEASE (HCC): ICD-10-CM

## 2021-11-08 PROCEDURE — 99214 OFFICE O/P EST MOD 30 MIN: CPT | Performed by: INTERNAL MEDICINE

## 2021-11-09 ENCOUNTER — TELEPHONE (OUTPATIENT)
Dept: GYNECOLOGIC ONCOLOGY | Facility: CLINIC | Age: 71
End: 2021-11-09

## 2021-11-09 DIAGNOSIS — C34.92 ADENOCARCINOMA OF LEFT LUNG, STAGE 4 (HCC): Primary | ICD-10-CM

## 2021-11-09 RX ORDER — VANCOMYCIN HYDROCHLORIDE 125 MG/1
125 CAPSULE ORAL 4 TIMES DAILY
Qty: 40 CAPSULE | Refills: 0 | Status: SHIPPED | OUTPATIENT
Start: 2021-11-09 | End: 2021-11-19

## 2021-11-10 ENCOUNTER — PATIENT OUTREACH (OUTPATIENT)
Dept: HEMATOLOGY ONCOLOGY | Facility: CLINIC | Age: 71
End: 2021-11-10

## 2021-11-23 DIAGNOSIS — F41.9 ANXIETY: ICD-10-CM

## 2021-11-23 DIAGNOSIS — F32.5 MAJOR DEPRESSIVE DISORDER WITH SINGLE EPISODE, IN FULL REMISSION (HCC): ICD-10-CM

## 2021-11-23 DIAGNOSIS — Z51.5 PALLIATIVE CARE PATIENT: ICD-10-CM

## 2021-11-24 ENCOUNTER — TELEPHONE (OUTPATIENT)
Dept: HEMATOLOGY ONCOLOGY | Facility: CLINIC | Age: 71
End: 2021-11-24

## 2021-11-24 ENCOUNTER — APPOINTMENT (OUTPATIENT)
Dept: LAB | Facility: AMBULARY SURGERY CENTER | Age: 71
End: 2021-11-24
Payer: MEDICARE

## 2021-11-24 ENCOUNTER — SOCIAL WORK (OUTPATIENT)
Dept: PALLIATIVE MEDICINE | Facility: CLINIC | Age: 71
End: 2021-11-24
Payer: MEDICARE

## 2021-11-24 ENCOUNTER — OFFICE VISIT (OUTPATIENT)
Dept: PALLIATIVE MEDICINE | Facility: CLINIC | Age: 71
End: 2021-11-24
Payer: MEDICARE

## 2021-11-24 VITALS
OXYGEN SATURATION: 98 % | TEMPERATURE: 92.9 F | WEIGHT: 177 LBS | HEIGHT: 64 IN | SYSTOLIC BLOOD PRESSURE: 140 MMHG | RESPIRATION RATE: 16 BRPM | DIASTOLIC BLOOD PRESSURE: 68 MMHG | HEART RATE: 68 BPM | BODY MASS INDEX: 30.22 KG/M2

## 2021-11-24 DIAGNOSIS — R19.7 DIARRHEA, UNSPECIFIED TYPE: ICD-10-CM

## 2021-11-24 DIAGNOSIS — I12.9 BENIGN HYPERTENSION WITH CKD (CHRONIC KIDNEY DISEASE) STAGE III (HCC): ICD-10-CM

## 2021-11-24 DIAGNOSIS — R53.83 OTHER FATIGUE: ICD-10-CM

## 2021-11-24 DIAGNOSIS — R11.2 CINV (CHEMOTHERAPY-INDUCED NAUSEA AND VOMITING): Chronic | ICD-10-CM

## 2021-11-24 DIAGNOSIS — C79.51 SECONDARY MALIGNANT NEOPLASM OF BONE AND BONE MARROW (HCC): ICD-10-CM

## 2021-11-24 DIAGNOSIS — N18.30 BENIGN HYPERTENSION WITH CKD (CHRONIC KIDNEY DISEASE) STAGE III (HCC): ICD-10-CM

## 2021-11-24 DIAGNOSIS — T45.1X5A CINV (CHEMOTHERAPY-INDUCED NAUSEA AND VOMITING): ICD-10-CM

## 2021-11-24 DIAGNOSIS — D70.1 CHEMOTHERAPY INDUCED NEUTROPENIA (HCC): ICD-10-CM

## 2021-11-24 DIAGNOSIS — R11.2 CINV (CHEMOTHERAPY-INDUCED NAUSEA AND VOMITING): ICD-10-CM

## 2021-11-24 DIAGNOSIS — C34.92 ADENOCARCINOMA OF LEFT LUNG, STAGE 4 (HCC): Primary | ICD-10-CM

## 2021-11-24 DIAGNOSIS — C79.52 SECONDARY MALIGNANT NEOPLASM OF BONE AND BONE MARROW (HCC): ICD-10-CM

## 2021-11-24 DIAGNOSIS — I50.32 CHRONIC DIASTOLIC HEART FAILURE (HCC): ICD-10-CM

## 2021-11-24 DIAGNOSIS — C34.92 ADENOCARCINOMA OF LEFT LUNG, STAGE 4 (HCC): ICD-10-CM

## 2021-11-24 DIAGNOSIS — E83.52 HYPERCALCEMIA: ICD-10-CM

## 2021-11-24 DIAGNOSIS — K21.9 GASTROESOPHAGEAL REFLUX DISEASE WITHOUT ESOPHAGITIS: ICD-10-CM

## 2021-11-24 DIAGNOSIS — T45.1X5A CHEMOTHERAPY INDUCED NEUTROPENIA (HCC): ICD-10-CM

## 2021-11-24 DIAGNOSIS — T45.1X5A CINV (CHEMOTHERAPY-INDUCED NAUSEA AND VOMITING): Chronic | ICD-10-CM

## 2021-11-24 DIAGNOSIS — F32.5 MAJOR DEPRESSIVE DISORDER WITH SINGLE EPISODE, IN FULL REMISSION (HCC): ICD-10-CM

## 2021-11-24 DIAGNOSIS — Z71.89 COUNSELING AND COORDINATION OF CARE: Primary | ICD-10-CM

## 2021-11-24 DIAGNOSIS — G89.3 CANCER RELATED PAIN: Chronic | ICD-10-CM

## 2021-11-24 DIAGNOSIS — N39.0 URINARY TRACT INFECTION WITHOUT HEMATURIA, SITE UNSPECIFIED: Primary | ICD-10-CM

## 2021-11-24 DIAGNOSIS — Z51.5 PALLIATIVE CARE PATIENT: ICD-10-CM

## 2021-11-24 DIAGNOSIS — C41.9 MALIGNANT NEOPLASM OF BONE WITH METASTASES (HCC): ICD-10-CM

## 2021-11-24 LAB
ALBUMIN SERPL BCP-MCNC: 2.5 G/DL (ref 3.5–5)
ALP SERPL-CCNC: 79 U/L (ref 46–116)
ALT SERPL W P-5'-P-CCNC: 15 U/L (ref 12–78)
ANION GAP SERPL CALCULATED.3IONS-SCNC: 7 MMOL/L (ref 4–13)
AST SERPL W P-5'-P-CCNC: 18 U/L (ref 5–45)
BACTERIA UR QL AUTO: ABNORMAL /HPF
BASOPHILS # BLD AUTO: 0.08 THOUSANDS/ΜL (ref 0–0.1)
BASOPHILS NFR BLD AUTO: 1 % (ref 0–1)
BILIRUB SERPL-MCNC: 1.17 MG/DL (ref 0.2–1)
BILIRUB UR QL STRIP: NEGATIVE
BUN SERPL-MCNC: 13 MG/DL (ref 5–25)
CALCIUM ALBUM COR SERPL-MCNC: 10.2 MG/DL (ref 8.3–10.1)
CALCIUM SERPL-MCNC: 9 MG/DL (ref 8.3–10.1)
CHLORIDE SERPL-SCNC: 110 MMOL/L (ref 100–108)
CLARITY UR: ABNORMAL
CO2 SERPL-SCNC: 22 MMOL/L (ref 21–32)
COLOR UR: YELLOW
CREAT SERPL-MCNC: 1.38 MG/DL (ref 0.6–1.3)
EOSINOPHIL # BLD AUTO: 0.02 THOUSAND/ΜL (ref 0–0.61)
EOSINOPHIL NFR BLD AUTO: 0 % (ref 0–6)
ERYTHROCYTE [DISTWIDTH] IN BLOOD BY AUTOMATED COUNT: 17.4 % (ref 11.6–15.1)
GFR SERPL CREATININE-BSD FRML MDRD: 38 ML/MIN/1.73SQ M
GLUCOSE P FAST SERPL-MCNC: 128 MG/DL (ref 65–99)
GLUCOSE UR STRIP-MCNC: NEGATIVE MG/DL
HCT VFR BLD AUTO: 35.2 % (ref 34.8–46.1)
HGB BLD-MCNC: 10.8 G/DL (ref 11.5–15.4)
HGB UR QL STRIP.AUTO: ABNORMAL
HYALINE CASTS #/AREA URNS LPF: ABNORMAL /LPF
IMM GRANULOCYTES # BLD AUTO: 0.04 THOUSAND/UL (ref 0–0.2)
IMM GRANULOCYTES NFR BLD AUTO: 1 % (ref 0–2)
KETONES UR STRIP-MCNC: NEGATIVE MG/DL
LEUKOCYTE ESTERASE UR QL STRIP: ABNORMAL
LYMPHOCYTES # BLD AUTO: 0.43 THOUSANDS/ΜL (ref 0.6–4.47)
LYMPHOCYTES NFR BLD AUTO: 6 % (ref 14–44)
MCH RBC QN AUTO: 28.4 PG (ref 26.8–34.3)
MCHC RBC AUTO-ENTMCNC: 30.7 G/DL (ref 31.4–37.4)
MCV RBC AUTO: 93 FL (ref 82–98)
MONOCYTES # BLD AUTO: 0.77 THOUSAND/ΜL (ref 0.17–1.22)
MONOCYTES NFR BLD AUTO: 12 % (ref 4–12)
NEUTROPHILS # BLD AUTO: 5.33 THOUSANDS/ΜL (ref 1.85–7.62)
NEUTS SEG NFR BLD AUTO: 80 % (ref 43–75)
NITRITE UR QL STRIP: NEGATIVE
NON-SQ EPI CELLS URNS QL MICRO: ABNORMAL /HPF
NRBC BLD AUTO-RTO: 0 /100 WBCS
PH UR STRIP.AUTO: 6 [PH]
PLATELET # BLD AUTO: 239 THOUSANDS/UL (ref 149–390)
PMV BLD AUTO: 11.7 FL (ref 8.9–12.7)
POTASSIUM SERPL-SCNC: 3.2 MMOL/L (ref 3.5–5.3)
PROT SERPL-MCNC: 6.1 G/DL (ref 6.4–8.2)
PROT UR STRIP-MCNC: ABNORMAL MG/DL
RBC # BLD AUTO: 3.8 MILLION/UL (ref 3.81–5.12)
RBC #/AREA URNS AUTO: ABNORMAL /HPF
SODIUM SERPL-SCNC: 139 MMOL/L (ref 136–145)
SP GR UR STRIP.AUTO: 1.01 (ref 1–1.03)
UROBILINOGEN UR QL STRIP.AUTO: 0.2 E.U./DL
WBC # BLD AUTO: 6.67 THOUSAND/UL (ref 4.31–10.16)
WBC #/AREA URNS AUTO: ABNORMAL /HPF

## 2021-11-24 PROCEDURE — 81001 URINALYSIS AUTO W/SCOPE: CPT

## 2021-11-24 PROCEDURE — 99024 POSTOP FOLLOW-UP VISIT: CPT

## 2021-11-24 PROCEDURE — 36415 COLL VENOUS BLD VENIPUNCTURE: CPT

## 2021-11-24 PROCEDURE — 85025 COMPLETE CBC W/AUTO DIFF WBC: CPT

## 2021-11-24 PROCEDURE — 99214 OFFICE O/P EST MOD 30 MIN: CPT | Performed by: INTERNAL MEDICINE

## 2021-11-24 PROCEDURE — 80053 COMPREHEN METABOLIC PANEL: CPT

## 2021-11-24 RX ORDER — BUSPIRONE HYDROCHLORIDE 7.5 MG/1
7.5 TABLET ORAL EVERY 12 HOURS SCHEDULED
Qty: 60 TABLET | Refills: 0 | Status: SHIPPED | OUTPATIENT
Start: 2021-11-24 | End: 2021-12-27 | Stop reason: SDUPTHER

## 2021-11-24 RX ORDER — LOSARTAN POTASSIUM 50 MG/1
50 TABLET ORAL DAILY
Qty: 90 TABLET | Refills: 3 | Status: SHIPPED | OUTPATIENT
Start: 2021-11-24 | End: 2022-01-28 | Stop reason: SDUPTHER

## 2021-11-24 RX ORDER — CIPROFLOXACIN 500 MG/1
500 TABLET, FILM COATED ORAL EVERY 12 HOURS SCHEDULED
Qty: 6 TABLET | Refills: 0 | Status: SHIPPED | OUTPATIENT
Start: 2021-11-24 | End: 2021-11-27

## 2021-11-24 RX ORDER — BUSPIRONE HYDROCHLORIDE 5 MG/1
TABLET ORAL
Qty: 60 TABLET | Refills: 0 | OUTPATIENT
Start: 2021-11-24

## 2021-11-26 ENCOUNTER — HOSPITAL ENCOUNTER (OUTPATIENT)
Dept: INFUSION CENTER | Facility: CLINIC | Age: 71
Discharge: HOME | End: 2021-11-26
Payer: MEDICARE

## 2021-11-26 VITALS
BODY MASS INDEX: 30.49 KG/M2 | SYSTOLIC BLOOD PRESSURE: 132 MMHG | DIASTOLIC BLOOD PRESSURE: 78 MMHG | HEART RATE: 73 BPM | TEMPERATURE: 97.9 F | OXYGEN SATURATION: 98 % | WEIGHT: 178.57 LBS | RESPIRATION RATE: 16 BRPM | HEIGHT: 64 IN

## 2021-11-26 DIAGNOSIS — R11.2 CINV (CHEMOTHERAPY-INDUCED NAUSEA AND VOMITING): ICD-10-CM

## 2021-11-26 DIAGNOSIS — D70.1 CHEMOTHERAPY INDUCED NEUTROPENIA (HCC): ICD-10-CM

## 2021-11-26 DIAGNOSIS — E83.52 HYPERCALCEMIA: Primary | ICD-10-CM

## 2021-11-26 DIAGNOSIS — C34.92 ADENOCARCINOMA OF LEFT LUNG, STAGE 4 (HCC): ICD-10-CM

## 2021-11-26 DIAGNOSIS — T45.1X5A CINV (CHEMOTHERAPY-INDUCED NAUSEA AND VOMITING): ICD-10-CM

## 2021-11-26 DIAGNOSIS — C41.9 MALIGNANT NEOPLASM OF BONE WITH METASTASES (HCC): ICD-10-CM

## 2021-11-26 DIAGNOSIS — T45.1X5A CHEMOTHERAPY INDUCED NEUTROPENIA (HCC): ICD-10-CM

## 2021-11-26 PROCEDURE — 96417 CHEMO IV INFUS EACH ADDL SEQ: CPT

## 2021-11-26 PROCEDURE — 96413 CHEMO IV INFUSION 1 HR: CPT

## 2021-11-26 PROCEDURE — 96377 APPLICATON ON-BODY INJECTOR: CPT

## 2021-11-26 PROCEDURE — 96367 TX/PROPH/DG ADDL SEQ IV INF: CPT

## 2021-11-26 RX ORDER — SODIUM CHLORIDE 9 MG/ML
20 INJECTION, SOLUTION INTRAVENOUS ONCE
Status: COMPLETED | OUTPATIENT
Start: 2021-11-26 | End: 2021-11-26

## 2021-11-26 RX ADMIN — DOCETAXEL 144 MG: 20 INJECTION, SOLUTION, CONCENTRATE INTRAVENOUS at 10:47

## 2021-11-26 RX ADMIN — PEGFILGRASTIM 6 MG: KIT SUBCUTANEOUS at 11:44

## 2021-11-26 RX ADMIN — SODIUM CHLORIDE 900 MG: 0.9 INJECTION, SOLUTION INTRAVENOUS at 09:40

## 2021-11-26 RX ADMIN — FOSAPREPITANT 150 MG: 150 INJECTION, POWDER, LYOPHILIZED, FOR SOLUTION INTRAVENOUS at 09:01

## 2021-11-26 RX ADMIN — SODIUM CHLORIDE 20 ML/HR: 0.9 INJECTION, SOLUTION INTRAVENOUS at 08:34

## 2021-11-26 RX ADMIN — DIPHENHYDRAMINE HYDROCHLORIDE 50 MG: 50 INJECTION, SOLUTION INTRAMUSCULAR; INTRAVENOUS at 08:34

## 2021-11-29 ENCOUNTER — HOSPITAL ENCOUNTER (OUTPATIENT)
Dept: RADIOLOGY | Age: 71
Discharge: HOME | End: 2021-11-29
Payer: MEDICARE

## 2021-11-29 DIAGNOSIS — D70.1 CHEMOTHERAPY INDUCED NEUTROPENIA (HCC): ICD-10-CM

## 2021-11-29 DIAGNOSIS — C41.9 MALIGNANT NEOPLASM OF BONE WITH METASTASES (HCC): ICD-10-CM

## 2021-11-29 DIAGNOSIS — T45.1X5A CHEMOTHERAPY INDUCED NEUTROPENIA (HCC): ICD-10-CM

## 2021-11-29 PROCEDURE — G1004 CDSM NDSC: HCPCS

## 2021-11-29 PROCEDURE — 71250 CT THORAX DX C-: CPT

## 2021-12-09 ENCOUNTER — TELEPHONE (OUTPATIENT)
Dept: PALLIATIVE MEDICINE | Facility: CLINIC | Age: 71
End: 2021-12-09

## 2021-12-09 ENCOUNTER — TELEPHONE (OUTPATIENT)
Dept: CARDIOLOGY CLINIC | Facility: CLINIC | Age: 71
End: 2021-12-09

## 2021-12-13 ENCOUNTER — OFFICE VISIT (OUTPATIENT)
Dept: HEMATOLOGY ONCOLOGY | Facility: CLINIC | Age: 71
End: 2021-12-13
Payer: MEDICARE

## 2021-12-13 VITALS
WEIGHT: 176 LBS | RESPIRATION RATE: 17 BRPM | HEIGHT: 64 IN | TEMPERATURE: 98 F | HEART RATE: 65 BPM | DIASTOLIC BLOOD PRESSURE: 90 MMHG | OXYGEN SATURATION: 95 % | SYSTOLIC BLOOD PRESSURE: 130 MMHG | BODY MASS INDEX: 30.05 KG/M2

## 2021-12-13 DIAGNOSIS — C34.92 ADENOCARCINOMA OF LEFT LUNG, STAGE 4 (HCC): ICD-10-CM

## 2021-12-13 DIAGNOSIS — J18.9 PNEUMONITIS: Primary | ICD-10-CM

## 2021-12-13 DIAGNOSIS — N18.32 STAGE 3B CHRONIC KIDNEY DISEASE (HCC): ICD-10-CM

## 2021-12-13 DIAGNOSIS — I87.8 POOR VENOUS ACCESS: ICD-10-CM

## 2021-12-13 PROCEDURE — 99215 OFFICE O/P EST HI 40 MIN: CPT | Performed by: PHYSICIAN ASSISTANT

## 2021-12-13 RX ORDER — LOPERAMIDE HYDROCHLORIDE 2 MG/1
2 CAPSULE ORAL 4 TIMES DAILY PRN
COMMUNITY

## 2021-12-14 ENCOUNTER — APPOINTMENT (OUTPATIENT)
Dept: RADIOLOGY | Facility: AMBULARY SURGERY CENTER | Age: 71
End: 2021-12-14
Payer: MEDICARE

## 2021-12-14 ENCOUNTER — ANESTHESIA EVENT (OUTPATIENT)
Dept: PERIOP | Facility: AMBULARY SURGERY CENTER | Age: 71
End: 2021-12-14
Payer: MEDICARE

## 2021-12-14 ENCOUNTER — HOSPITAL ENCOUNTER (OUTPATIENT)
Facility: AMBULARY SURGERY CENTER | Age: 71
Setting detail: OUTPATIENT SURGERY
Discharge: HOME | End: 2021-12-14
Attending: RADIOLOGY | Admitting: RADIOLOGY
Payer: MEDICARE

## 2021-12-14 ENCOUNTER — ANESTHESIA (OUTPATIENT)
Dept: PERIOP | Facility: AMBULARY SURGERY CENTER | Age: 71
End: 2021-12-14
Payer: MEDICARE

## 2021-12-14 VITALS
RESPIRATION RATE: 18 BRPM | DIASTOLIC BLOOD PRESSURE: 78 MMHG | WEIGHT: 173.6 LBS | TEMPERATURE: 97 F | OXYGEN SATURATION: 92 % | SYSTOLIC BLOOD PRESSURE: 195 MMHG | HEIGHT: 64 IN | BODY MASS INDEX: 29.64 KG/M2 | HEART RATE: 61 BPM

## 2021-12-14 PROBLEM — Z87.891 FORMER SMOKER: Status: ACTIVE | Noted: 2021-12-14

## 2021-12-14 LAB — GLUCOSE SERPL-MCNC: 108 MG/DL (ref 65–140)

## 2021-12-14 PROCEDURE — C1788 PORT, INDWELLING, IMP: HCPCS | Performed by: RADIOLOGY

## 2021-12-14 PROCEDURE — 82948 REAGENT STRIP/BLOOD GLUCOSE: CPT

## 2021-12-14 PROCEDURE — 77001 FLUOROGUIDE FOR VEIN DEVICE: CPT

## 2021-12-14 PROCEDURE — 77001 FLUOROGUIDE FOR VEIN DEVICE: CPT | Performed by: RADIOLOGY

## 2021-12-14 PROCEDURE — 36561 INSERT TUNNELED CV CATH: CPT | Performed by: RADIOLOGY

## 2021-12-14 PROCEDURE — 76937 US GUIDE VASCULAR ACCESS: CPT | Performed by: RADIOLOGY

## 2021-12-14 DEVICE — PORT DIGINTY 8FR MICRO-STICK KIT HEMODIAL MID SIZE: Type: IMPLANTABLE DEVICE | Site: CHEST  WALL | Status: FUNCTIONAL

## 2021-12-14 RX ORDER — SODIUM CHLORIDE, SODIUM LACTATE, POTASSIUM CHLORIDE, CALCIUM CHLORIDE 600; 310; 30; 20 MG/100ML; MG/100ML; MG/100ML; MG/100ML
125 INJECTION, SOLUTION INTRAVENOUS CONTINUOUS
Status: DISCONTINUED | OUTPATIENT
Start: 2021-12-14 | End: 2021-12-14 | Stop reason: HOSPADM

## 2021-12-14 RX ORDER — FENTANYL CITRATE 50 UG/ML
INJECTION, SOLUTION INTRAMUSCULAR; INTRAVENOUS AS NEEDED
Status: DISCONTINUED | OUTPATIENT
Start: 2021-12-14 | End: 2021-12-14

## 2021-12-14 RX ORDER — MIDAZOLAM HYDROCHLORIDE 2 MG/2ML
INJECTION, SOLUTION INTRAMUSCULAR; INTRAVENOUS AS NEEDED
Status: DISCONTINUED | OUTPATIENT
Start: 2021-12-14 | End: 2021-12-14

## 2021-12-14 RX ORDER — SODIUM CHLORIDE, SODIUM LACTATE, POTASSIUM CHLORIDE, CALCIUM CHLORIDE 600; 310; 30; 20 MG/100ML; MG/100ML; MG/100ML; MG/100ML
INJECTION, SOLUTION INTRAVENOUS CONTINUOUS PRN
Status: DISCONTINUED | OUTPATIENT
Start: 2021-12-14 | End: 2021-12-14

## 2021-12-14 RX ORDER — SODIUM CHLORIDE, SODIUM LACTATE, POTASSIUM CHLORIDE, CALCIUM CHLORIDE 600; 310; 30; 20 MG/100ML; MG/100ML; MG/100ML; MG/100ML
20 INJECTION, SOLUTION INTRAVENOUS CONTINUOUS
Status: DISCONTINUED | OUTPATIENT
Start: 2021-12-14 | End: 2021-12-14 | Stop reason: HOSPADM

## 2021-12-14 RX ORDER — CEFAZOLIN SODIUM 2 G/50ML
2000 SOLUTION INTRAVENOUS ONCE
Status: COMPLETED | OUTPATIENT
Start: 2021-12-14 | End: 2021-12-14

## 2021-12-14 RX ORDER — PROPOFOL 10 MG/ML
INJECTION, EMULSION INTRAVENOUS CONTINUOUS PRN
Status: DISCONTINUED | OUTPATIENT
Start: 2021-12-14 | End: 2021-12-14

## 2021-12-14 RX ORDER — DEXAMETHASONE SODIUM PHOSPHATE 4 MG/ML
INJECTION, SOLUTION INTRA-ARTICULAR; INTRALESIONAL; INTRAMUSCULAR; INTRAVENOUS; SOFT TISSUE AS NEEDED
Status: DISCONTINUED | OUTPATIENT
Start: 2021-12-14 | End: 2021-12-14

## 2021-12-14 RX ORDER — FENTANYL CITRATE/PF 50 MCG/ML
25 SYRINGE (ML) INJECTION
Status: DISCONTINUED | OUTPATIENT
Start: 2021-12-14 | End: 2021-12-14 | Stop reason: HOSPADM

## 2021-12-14 RX ORDER — LIDOCAINE HYDROCHLORIDE 10 MG/ML
INJECTION, SOLUTION EPIDURAL; INFILTRATION; INTRACAUDAL; PERINEURAL AS NEEDED
Status: DISCONTINUED | OUTPATIENT
Start: 2021-12-14 | End: 2021-12-14

## 2021-12-14 RX ORDER — LIDOCAINE HYDROCHLORIDE AND EPINEPHRINE 10; 10 MG/ML; UG/ML
INJECTION, SOLUTION INFILTRATION; PERINEURAL AS NEEDED
Status: DISCONTINUED | OUTPATIENT
Start: 2021-12-14 | End: 2021-12-14 | Stop reason: HOSPADM

## 2021-12-14 RX ADMIN — PROPOFOL 75 MCG/KG/MIN: 10 INJECTION, EMULSION INTRAVENOUS at 09:13

## 2021-12-14 RX ADMIN — LIDOCAINE HYDROCHLORIDE 30 MG: 10 INJECTION, SOLUTION EPIDURAL; INFILTRATION; INTRACAUDAL at 09:13

## 2021-12-14 RX ADMIN — DEXAMETHASONE SODIUM PHOSPHATE 4 MG: 4 INJECTION INTRA-ARTICULAR; INTRALESIONAL; INTRAMUSCULAR; INTRAVENOUS; SOFT TISSUE at 09:23

## 2021-12-14 RX ADMIN — SODIUM CHLORIDE, SODIUM LACTATE, POTASSIUM CHLORIDE, AND CALCIUM CHLORIDE: .6; .31; .03; .02 INJECTION, SOLUTION INTRAVENOUS at 08:41

## 2021-12-14 RX ADMIN — MIDAZOLAM HYDROCHLORIDE 1 MG: 1 INJECTION, SOLUTION INTRAMUSCULAR; INTRAVENOUS at 09:11

## 2021-12-14 RX ADMIN — FENTANYL CITRATE 50 MCG: 50 INJECTION, SOLUTION INTRAMUSCULAR; INTRAVENOUS at 09:11

## 2021-12-14 RX ADMIN — CEFAZOLIN SODIUM 2000 MG: 2 SOLUTION INTRAVENOUS at 09:10

## 2021-12-14 RX ADMIN — CEFAZOLIN SODIUM 2000 MG: 2 SOLUTION INTRAVENOUS at 08:37

## 2021-12-16 ENCOUNTER — OFFICE VISIT (OUTPATIENT)
Dept: CARDIOLOGY CLINIC | Facility: CLINIC | Age: 71
End: 2021-12-16
Payer: MEDICARE

## 2021-12-16 ENCOUNTER — APPOINTMENT (OUTPATIENT)
Dept: LAB | Facility: AMBULARY SURGERY CENTER | Age: 71
End: 2021-12-16
Payer: MEDICARE

## 2021-12-16 VITALS
SYSTOLIC BLOOD PRESSURE: 162 MMHG | WEIGHT: 179 LBS | OXYGEN SATURATION: 98 % | DIASTOLIC BLOOD PRESSURE: 82 MMHG | HEIGHT: 64 IN | BODY MASS INDEX: 30.56 KG/M2 | HEART RATE: 68 BPM

## 2021-12-16 DIAGNOSIS — C34.92 ADENOCARCINOMA OF LEFT LUNG, STAGE 4 (HCC): ICD-10-CM

## 2021-12-16 DIAGNOSIS — E78.2 MIXED HYPERLIPIDEMIA: ICD-10-CM

## 2021-12-16 DIAGNOSIS — I48.0 PAROXYSMAL ATRIAL FIBRILLATION (HCC): Primary | ICD-10-CM

## 2021-12-16 DIAGNOSIS — E83.52 HYPERCALCEMIA: ICD-10-CM

## 2021-12-16 DIAGNOSIS — I12.9 BENIGN HYPERTENSION WITH CKD (CHRONIC KIDNEY DISEASE) STAGE III (HCC): ICD-10-CM

## 2021-12-16 DIAGNOSIS — N18.30 BENIGN HYPERTENSION WITH CKD (CHRONIC KIDNEY DISEASE) STAGE III (HCC): ICD-10-CM

## 2021-12-16 DIAGNOSIS — R11.2 CINV (CHEMOTHERAPY-INDUCED NAUSEA AND VOMITING): ICD-10-CM

## 2021-12-16 DIAGNOSIS — T45.1X5A CINV (CHEMOTHERAPY-INDUCED NAUSEA AND VOMITING): ICD-10-CM

## 2021-12-16 DIAGNOSIS — I10 PRIMARY HYPERTENSION: ICD-10-CM

## 2021-12-16 DIAGNOSIS — T45.1X5A CHEMOTHERAPY INDUCED NEUTROPENIA (HCC): ICD-10-CM

## 2021-12-16 DIAGNOSIS — C34.90 ADENOCARCINOMA OF LUNG, STAGE 4, UNSPECIFIED LATERALITY (HCC): ICD-10-CM

## 2021-12-16 DIAGNOSIS — C41.9 MALIGNANT NEOPLASM OF BONE WITH METASTASES (HCC): ICD-10-CM

## 2021-12-16 DIAGNOSIS — D70.1 CHEMOTHERAPY INDUCED NEUTROPENIA (HCC): ICD-10-CM

## 2021-12-16 LAB
ALBUMIN SERPL BCP-MCNC: 2.6 G/DL (ref 3.5–5)
ALP SERPL-CCNC: 90 U/L (ref 46–116)
ALT SERPL W P-5'-P-CCNC: 11 U/L (ref 12–78)
ANION GAP SERPL CALCULATED.3IONS-SCNC: 4 MMOL/L (ref 4–13)
AST SERPL W P-5'-P-CCNC: 17 U/L (ref 5–45)
BASOPHILS # BLD AUTO: 0.1 THOUSANDS/ΜL (ref 0–0.1)
BASOPHILS NFR BLD AUTO: 1 % (ref 0–1)
BILIRUB SERPL-MCNC: 0.47 MG/DL (ref 0.2–1)
BUN SERPL-MCNC: 11 MG/DL (ref 5–25)
CALCIUM ALBUM COR SERPL-MCNC: 9.7 MG/DL (ref 8.3–10.1)
CALCIUM SERPL-MCNC: 8.6 MG/DL (ref 8.3–10.1)
CHLORIDE SERPL-SCNC: 112 MMOL/L (ref 100–108)
CO2 SERPL-SCNC: 26 MMOL/L (ref 21–32)
CREAT SERPL-MCNC: 1.48 MG/DL (ref 0.6–1.3)
EOSINOPHIL # BLD AUTO: 0 THOUSAND/ΜL (ref 0–0.61)
EOSINOPHIL NFR BLD AUTO: 0 % (ref 0–6)
ERYTHROCYTE [DISTWIDTH] IN BLOOD BY AUTOMATED COUNT: 16.9 % (ref 11.6–15.1)
GFR SERPL CREATININE-BSD FRML MDRD: 34 ML/MIN/1.73SQ M
GLUCOSE P FAST SERPL-MCNC: 164 MG/DL (ref 65–99)
HCT VFR BLD AUTO: 35.2 % (ref 34.8–46.1)
HGB BLD-MCNC: 10.5 G/DL (ref 11.5–15.4)
IMM GRANULOCYTES # BLD AUTO: 0.07 THOUSAND/UL (ref 0–0.2)
IMM GRANULOCYTES NFR BLD AUTO: 1 % (ref 0–2)
LYMPHOCYTES # BLD AUTO: 0.54 THOUSANDS/ΜL (ref 0.6–4.47)
LYMPHOCYTES NFR BLD AUTO: 7 % (ref 14–44)
MCH RBC QN AUTO: 28.5 PG (ref 26.8–34.3)
MCHC RBC AUTO-ENTMCNC: 29.8 G/DL (ref 31.4–37.4)
MCV RBC AUTO: 95 FL (ref 82–98)
MONOCYTES # BLD AUTO: 0.51 THOUSAND/ΜL (ref 0.17–1.22)
MONOCYTES NFR BLD AUTO: 6 % (ref 4–12)
NEUTROPHILS # BLD AUTO: 7.02 THOUSANDS/ΜL (ref 1.85–7.62)
NEUTS SEG NFR BLD AUTO: 85 % (ref 43–75)
NRBC BLD AUTO-RTO: 0 /100 WBCS
PLATELET # BLD AUTO: 213 THOUSANDS/UL (ref 149–390)
PMV BLD AUTO: 11.7 FL (ref 8.9–12.7)
POTASSIUM SERPL-SCNC: 4 MMOL/L (ref 3.5–5.3)
PROT SERPL-MCNC: 5.7 G/DL (ref 6.4–8.2)
RBC # BLD AUTO: 3.69 MILLION/UL (ref 3.81–5.12)
SODIUM SERPL-SCNC: 142 MMOL/L (ref 136–145)
WBC # BLD AUTO: 8.24 THOUSAND/UL (ref 4.31–10.16)

## 2021-12-16 PROCEDURE — 80053 COMPREHEN METABOLIC PANEL: CPT

## 2021-12-16 PROCEDURE — 93000 ELECTROCARDIOGRAM COMPLETE: CPT | Performed by: INTERNAL MEDICINE

## 2021-12-16 PROCEDURE — 85025 COMPLETE CBC W/AUTO DIFF WBC: CPT

## 2021-12-16 PROCEDURE — 99214 OFFICE O/P EST MOD 30 MIN: CPT | Performed by: INTERNAL MEDICINE

## 2021-12-16 PROCEDURE — 36415 COLL VENOUS BLD VENIPUNCTURE: CPT

## 2021-12-17 ENCOUNTER — HOSPITAL ENCOUNTER (OUTPATIENT)
Dept: INFUSION CENTER | Facility: CLINIC | Age: 71
Discharge: HOME | End: 2021-12-17
Payer: MEDICARE

## 2021-12-17 VITALS
DIASTOLIC BLOOD PRESSURE: 70 MMHG | BODY MASS INDEX: 30.49 KG/M2 | HEIGHT: 64 IN | WEIGHT: 178.57 LBS | SYSTOLIC BLOOD PRESSURE: 166 MMHG | HEART RATE: 60 BPM | TEMPERATURE: 97.5 F | OXYGEN SATURATION: 96 % | RESPIRATION RATE: 16 BRPM

## 2021-12-17 DIAGNOSIS — C34.92 ADENOCARCINOMA OF LEFT LUNG, STAGE 4 (HCC): ICD-10-CM

## 2021-12-17 DIAGNOSIS — D70.1 CHEMOTHERAPY INDUCED NEUTROPENIA (HCC): ICD-10-CM

## 2021-12-17 DIAGNOSIS — T45.1X5A CINV (CHEMOTHERAPY-INDUCED NAUSEA AND VOMITING): ICD-10-CM

## 2021-12-17 DIAGNOSIS — C41.9 MALIGNANT NEOPLASM OF BONE WITH METASTASES (HCC): ICD-10-CM

## 2021-12-17 DIAGNOSIS — R11.2 CINV (CHEMOTHERAPY-INDUCED NAUSEA AND VOMITING): ICD-10-CM

## 2021-12-17 DIAGNOSIS — E83.52 HYPERCALCEMIA: Primary | ICD-10-CM

## 2021-12-17 DIAGNOSIS — T45.1X5A CHEMOTHERAPY INDUCED NEUTROPENIA (HCC): ICD-10-CM

## 2021-12-17 PROCEDURE — 96377 APPLICATON ON-BODY INJECTOR: CPT

## 2021-12-17 PROCEDURE — 96413 CHEMO IV INFUSION 1 HR: CPT

## 2021-12-17 PROCEDURE — 96417 CHEMO IV INFUS EACH ADDL SEQ: CPT

## 2021-12-17 PROCEDURE — 96367 TX/PROPH/DG ADDL SEQ IV INF: CPT

## 2021-12-17 RX ORDER — SODIUM CHLORIDE 9 MG/ML
20 INJECTION, SOLUTION INTRAVENOUS ONCE
Status: COMPLETED | OUTPATIENT
Start: 2021-12-17 | End: 2021-12-17

## 2021-12-17 RX ADMIN — DIPHENHYDRAMINE HYDROCHLORIDE 50 MG: 50 INJECTION, SOLUTION INTRAMUSCULAR; INTRAVENOUS at 12:33

## 2021-12-17 RX ADMIN — SODIUM CHLORIDE 800 MG: 0.9 INJECTION, SOLUTION INTRAVENOUS at 13:38

## 2021-12-17 RX ADMIN — FOSAPREPITANT 150 MG: 150 INJECTION, POWDER, LYOPHILIZED, FOR SOLUTION INTRAVENOUS at 12:55

## 2021-12-17 RX ADMIN — PEGFILGRASTIM 6 MG: KIT SUBCUTANEOUS at 15:57

## 2021-12-17 RX ADMIN — DOCETAXEL 138 MG: 20 INJECTION, SOLUTION, CONCENTRATE INTRAVENOUS at 14:49

## 2021-12-17 RX ADMIN — SODIUM CHLORIDE 20 ML/HR: 0.9 INJECTION, SOLUTION INTRAVENOUS at 12:32

## 2021-12-22 ENCOUNTER — TELEPHONE (OUTPATIENT)
Dept: HEMATOLOGY ONCOLOGY | Facility: CLINIC | Age: 71
End: 2021-12-22

## 2021-12-27 DIAGNOSIS — T45.1X5A CHEMOTHERAPY-INDUCED NAUSEA: Primary | ICD-10-CM

## 2021-12-27 DIAGNOSIS — R11.0 CHEMOTHERAPY-INDUCED NAUSEA: Primary | ICD-10-CM

## 2021-12-27 RX ORDER — PROCHLORPERAZINE MALEATE 10 MG
10 TABLET ORAL EVERY 6 HOURS PRN
Qty: 30 TABLET | Refills: 2 | Status: SHIPPED | OUTPATIENT
Start: 2021-12-27 | End: 2022-01-27

## 2022-01-03 ENCOUNTER — TELEPHONE (OUTPATIENT)
Dept: HEMATOLOGY ONCOLOGY | Facility: CLINIC | Age: 72
End: 2022-01-03

## 2022-01-03 ENCOUNTER — TELEPHONE (OUTPATIENT)
Dept: CARDIOLOGY CLINIC | Facility: CLINIC | Age: 72
End: 2022-01-03

## 2022-01-03 ENCOUNTER — APPOINTMENT (EMERGENCY)
Dept: RADIOLOGY | Facility: HOSPITAL | Age: 72
DRG: 064 | End: 2022-01-03
Payer: MEDICARE

## 2022-01-03 ENCOUNTER — TELEPHONE (OUTPATIENT)
Dept: SURGICAL ONCOLOGY | Facility: CLINIC | Age: 72
End: 2022-01-03

## 2022-01-03 ENCOUNTER — APPOINTMENT (EMERGENCY)
Dept: CT IMAGING | Facility: HOSPITAL | Age: 72
DRG: 064 | End: 2022-01-03
Payer: MEDICARE

## 2022-01-03 ENCOUNTER — HOSPITAL ENCOUNTER (INPATIENT)
Facility: HOSPITAL | Age: 72
LOS: 7 days | DRG: 064 | End: 2022-01-11
Attending: STUDENT IN AN ORGANIZED HEALTH CARE EDUCATION/TRAINING PROGRAM | Admitting: SURGERY
Payer: MEDICARE

## 2022-01-03 ENCOUNTER — NURSE TRIAGE (OUTPATIENT)
Dept: OTHER | Facility: OTHER | Age: 72
End: 2022-01-03

## 2022-01-03 DIAGNOSIS — I63.9 ACUTE ISCHEMIC STROKE (HCC): ICD-10-CM

## 2022-01-03 DIAGNOSIS — R41.0 CONFUSION: ICD-10-CM

## 2022-01-03 DIAGNOSIS — R29.6 MULTIPLE FALLS: ICD-10-CM

## 2022-01-03 DIAGNOSIS — I10 PRIMARY HYPERTENSION: Primary | ICD-10-CM
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
## 2022-01-03 PROBLEM — N18.30 CHRONIC KIDNEY DISEASE, STAGE 3 (HCC): Status: ACTIVE | Noted: 2022-01-03

## 2022-01-03 PROBLEM — T14.8XXA HEMATOMA: Status: ACTIVE | Noted: 2022-01-03

## 2022-01-03 PROBLEM — I48.91 ATRIAL FIBRILLATION (HCC): Status: ACTIVE | Noted: 2022-01-03

## 2022-01-03 PROBLEM — E87.6 HYPOKALEMIA: Status: ACTIVE | Noted: 2022-01-03

## 2022-01-03 PROBLEM — F32.A DEPRESSION: Status: ACTIVE | Noted: 2022-01-03

## 2022-01-03 PROBLEM — E11.9 DIABETES TYPE 2, CONTROLLED (HCC): Status: ACTIVE | Noted: 2022-01-03

## 2022-01-03 PROBLEM — S80.12XA CONTUSION OF LEFT LOWER LEG: Status: ACTIVE | Noted: 2022-01-03

## 2022-01-03 PROBLEM — C34.90 LUNG CANCER (HCC): Status: ACTIVE | Noted: 2022-01-03

## 2022-01-03 LAB
ANION GAP SERPL CALCULATED.3IONS-SCNC: 14 MMOL/L (ref 4–13)
APTT PPP: 32 SECONDS (ref 23–37)
BASOPHILS # BLD AUTO: 0.06 THOUSANDS/ΜL (ref 0–0.1)
BASOPHILS NFR BLD AUTO: 1 % (ref 0–1)
BUN SERPL-MCNC: 15 MG/DL (ref 5–25)
CALCIUM SERPL-MCNC: 9.1 MG/DL (ref 8.3–10.1)
CHLORIDE SERPL-SCNC: 107 MMOL/L (ref 100–108)
CO2 SERPL-SCNC: 23 MMOL/L (ref 21–32)
CREAT SERPL-MCNC: 1.78 MG/DL (ref 0.6–1.3)
EOSINOPHIL # BLD AUTO: 0.01 THOUSAND/ΜL (ref 0–0.61)
EOSINOPHIL NFR BLD AUTO: 0 % (ref 0–6)
ERYTHROCYTE [DISTWIDTH] IN BLOOD BY AUTOMATED COUNT: 16.7 % (ref 11.6–15.1)
FLUAV RNA RESP QL NAA+PROBE: NEGATIVE
FLUBV RNA RESP QL NAA+PROBE: NEGATIVE
GFR SERPL CREATININE-BSD FRML MDRD: 27 ML/MIN/1.73SQ M
GLUCOSE SERPL-MCNC: 101 MG/DL (ref 65–140)
HCT VFR BLD AUTO: 35.9 % (ref 34.8–46.1)
HGB BLD-MCNC: 11.1 G/DL (ref 11.5–15.4)
IMM GRANULOCYTES # BLD AUTO: 0.07 THOUSAND/UL (ref 0–0.2)
IMM GRANULOCYTES NFR BLD AUTO: 1 % (ref 0–2)
INR PPP: 1.13 (ref 0.84–1.19)
LYMPHOCYTES # BLD AUTO: 0.81 THOUSANDS/ΜL (ref 0.6–4.47)
LYMPHOCYTES NFR BLD AUTO: 6 % (ref 14–44)
MCH RBC QN AUTO: 28.2 PG (ref 26.8–34.3)
MCHC RBC AUTO-ENTMCNC: 30.9 G/DL (ref 31.4–37.4)
MCV RBC AUTO: 91 FL (ref 82–98)
MONOCYTES # BLD AUTO: 0.71 THOUSAND/ΜL (ref 0.17–1.22)
MONOCYTES NFR BLD AUTO: 5 % (ref 4–12)
NEUTROPHILS # BLD AUTO: 11.57 THOUSANDS/ΜL (ref 1.85–7.62)
NEUTS SEG NFR BLD AUTO: 87 % (ref 43–75)
NRBC BLD AUTO-RTO: 0 /100 WBCS
PLATELET # BLD AUTO: 191 THOUSANDS/UL (ref 149–390)
PMV BLD AUTO: 11.9 FL (ref 8.9–12.7)
POTASSIUM SERPL-SCNC: 3.3 MMOL/L (ref 3.5–5.3)
PROTHROMBIN TIME: 14.5 SECONDS (ref 11.6–14.5)
RBC # BLD AUTO: 3.93 MILLION/UL (ref 3.81–5.12)
RSV RNA RESP QL NAA+PROBE: NEGATIVE
SARS-COV-2 RNA RESP QL NAA+PROBE: NEGATIVE
SODIUM SERPL-SCNC: 144 MMOL/L (ref 136–145)
WBC # BLD AUTO: 13.23 THOUSAND/UL (ref 4.31–10.16)

## 2022-01-03 PROCEDURE — 0241U HB NFCT DS VIR RESP RNA 4 TRGT: CPT | Performed by: NURSE PRACTITIONER

## 2022-01-03 PROCEDURE — 84295 ASSAY OF SERUM SODIUM: CPT

## 2022-01-03 PROCEDURE — 99220 PR INITIAL OBSERVATION CARE/DAY 70 MINUTES: CPT | Performed by: STUDENT IN AN ORGANIZED HEALTH CARE EDUCATION/TRAINING PROGRAM

## 2022-01-03 PROCEDURE — 84132 ASSAY OF SERUM POTASSIUM: CPT

## 2022-01-03 PROCEDURE — 85610 PROTHROMBIN TIME: CPT | Performed by: STUDENT IN AN ORGANIZED HEALTH CARE EDUCATION/TRAINING PROGRAM

## 2022-01-03 PROCEDURE — 70450 CT HEAD/BRAIN W/O DYE: CPT

## 2022-01-03 PROCEDURE — 93308 TTE F-UP OR LMTD: CPT | Performed by: STUDENT IN AN ORGANIZED HEALTH CARE EDUCATION/TRAINING PROGRAM

## 2022-01-03 PROCEDURE — 76705 ECHO EXAM OF ABDOMEN: CPT | Performed by: STUDENT IN AN ORGANIZED HEALTH CARE EDUCATION/TRAINING PROGRAM

## 2022-01-03 PROCEDURE — 85014 HEMATOCRIT: CPT

## 2022-01-03 PROCEDURE — 72125 CT NECK SPINE W/O DYE: CPT

## 2022-01-03 PROCEDURE — 99285 EMERGENCY DEPT VISIT HI MDM: CPT

## 2022-01-03 PROCEDURE — 80048 BASIC METABOLIC PNL TOTAL CA: CPT | Performed by: STUDENT IN AN ORGANIZED HEALTH CARE EDUCATION/TRAINING PROGRAM

## 2022-01-03 PROCEDURE — NC001 PR NO CHARGE: Performed by: EMERGENCY MEDICINE

## 2022-01-03 PROCEDURE — 85730 THROMBOPLASTIN TIME PARTIAL: CPT | Performed by: STUDENT IN AN ORGANIZED HEALTH CARE EDUCATION/TRAINING PROGRAM

## 2022-01-03 PROCEDURE — 93005 ELECTROCARDIOGRAM TRACING: CPT

## 2022-01-03 PROCEDURE — 82947 ASSAY GLUCOSE BLOOD QUANT: CPT

## 2022-01-03 PROCEDURE — 71045 X-RAY EXAM CHEST 1 VIEW: CPT

## 2022-01-03 PROCEDURE — 74177 CT ABD & PELVIS W/CONTRAST: CPT

## 2022-01-03 PROCEDURE — 85025 COMPLETE CBC W/AUTO DIFF WBC: CPT | Performed by: STUDENT IN AN ORGANIZED HEALTH CARE EDUCATION/TRAINING PROGRAM

## 2022-01-03 PROCEDURE — 71260 CT THORAX DX C+: CPT

## 2022-01-03 PROCEDURE — 82803 BLOOD GASES ANY COMBINATION: CPT

## 2022-01-03 PROCEDURE — 73600 X-RAY EXAM OF ANKLE: CPT

## 2022-01-03 PROCEDURE — 36415 COLL VENOUS BLD VENIPUNCTURE: CPT

## 2022-01-03 RX ORDER — AMLODIPINE BESYLATE 5 MG/1
5 TABLET ORAL DAILY
COMMUNITY
End: 2022-01-21 | Stop reason: HOSPADM

## 2022-01-03 RX ORDER — ACETAMINOPHEN 325 MG/1
650 TABLET ORAL EVERY 6 HOURS PRN
Status: DISCONTINUED | OUTPATIENT
Start: 2022-01-03 | End: 2022-01-11 | Stop reason: HOSPADM

## 2022-01-03 RX ORDER — LOSARTAN POTASSIUM 50 MG/1
50 TABLET ORAL DAILY
Status: ON HOLD | COMMUNITY
End: 2022-01-21 | Stop reason: SDUPTHER

## 2022-01-03 RX ORDER — BUSPIRONE HYDROCHLORIDE 7.5 MG/1
7.5 TABLET ORAL 2 TIMES DAILY
COMMUNITY
End: 2022-03-21 | Stop reason: SDUPTHER

## 2022-01-03 RX ORDER — SODIUM CHLORIDE, SODIUM GLUCONATE, SODIUM ACETATE, POTASSIUM CHLORIDE, MAGNESIUM CHLORIDE, SODIUM PHOSPHATE, DIBASIC, AND POTASSIUM PHOSPHATE .53; .5; .37; .037; .03; .012; .00082 G/100ML; G/100ML; G/100ML; G/100ML; G/100ML; G/100ML; G/100ML
75 INJECTION, SOLUTION INTRAVENOUS CONTINUOUS
Status: DISCONTINUED | OUTPATIENT
Start: 2022-01-03 | End: 2022-01-04

## 2022-01-03 RX ORDER — FOLIC ACID 1 MG/1
1 TABLET ORAL DAILY
Status: DISCONTINUED | OUTPATIENT
Start: 2022-01-04 | End: 2022-01-11 | Stop reason: HOSPADM

## 2022-01-03 RX ORDER — POTASSIUM CHLORIDE 20 MEQ/1
20 TABLET, EXTENDED RELEASE ORAL ONCE
Status: COMPLETED | OUTPATIENT
Start: 2022-01-03 | End: 2022-01-03

## 2022-01-03 RX ORDER — FUROSEMIDE 40 MG/1
40 TABLET ORAL 2 TIMES DAILY
COMMUNITY
End: 2022-01-21 | Stop reason: HOSPADM

## 2022-01-03 RX ORDER — MELATONIN
2000 DAILY
COMMUNITY
End: 2022-02-14

## 2022-01-03 RX ORDER — LINAGLIPTIN 5 MG/1
5 TABLET, FILM COATED ORAL 2 TIMES DAILY
COMMUNITY
End: 2022-01-27

## 2022-01-03 RX ORDER — MELATONIN
2000 DAILY
Status: DISCONTINUED | OUTPATIENT
Start: 2022-01-04 | End: 2022-01-11 | Stop reason: HOSPADM

## 2022-01-03 RX ORDER — BUSPIRONE HYDROCHLORIDE 5 MG/1
7.5 TABLET ORAL 2 TIMES DAILY
Status: DISCONTINUED | OUTPATIENT
Start: 2022-01-03 | End: 2022-01-11 | Stop reason: HOSPADM

## 2022-01-03 RX ORDER — SOTALOL HYDROCHLORIDE 80 MG/1
80 TABLET ORAL 2 TIMES DAILY
COMMUNITY
End: 2022-01-27

## 2022-01-03 RX ORDER — LORATADINE 10 MG/1
10 TABLET ORAL AS NEEDED
COMMUNITY

## 2022-01-03 RX ORDER — AMLODIPINE BESYLATE 5 MG/1
5 TABLET ORAL EVERY EVENING
Status: DISCONTINUED | OUTPATIENT
Start: 2022-01-03 | End: 2022-01-04

## 2022-01-03 RX ORDER — SOTALOL HYDROCHLORIDE 80 MG/1
80 TABLET ORAL 2 TIMES DAILY
Status: DISCONTINUED | OUTPATIENT
Start: 2022-01-03 | End: 2022-01-11 | Stop reason: HOSPADM

## 2022-01-03 RX ORDER — AMLODIPINE BESYLATE 5 MG/1
5 TABLET ORAL DAILY
Qty: 30 TABLET | Refills: 3 | Status: SHIPPED | OUTPATIENT
Start: 2022-01-03 | End: 2022-01-27

## 2022-01-03 RX ORDER — PROCHLORPERAZINE MALEATE 10 MG
10 TABLET ORAL EVERY 12 HOURS PRN
COMMUNITY
End: 2022-01-21 | Stop reason: HOSPADM

## 2022-01-03 RX ORDER — VENLAFAXINE HYDROCHLORIDE 37.5 MG/1
37.5 CAPSULE, EXTENDED RELEASE ORAL
Status: DISCONTINUED | OUTPATIENT
Start: 2022-01-03 | End: 2022-01-11 | Stop reason: HOSPADM

## 2022-01-03 RX ORDER — PROCHLORPERAZINE MALEATE 10 MG
10 TABLET ORAL EVERY 12 HOURS PRN
Status: DISCONTINUED | OUTPATIENT
Start: 2022-01-03 | End: 2022-01-11 | Stop reason: HOSPADM

## 2022-01-03 RX ORDER — LORATADINE 10 MG/1
10 TABLET ORAL DAILY
Status: DISCONTINUED | OUTPATIENT
Start: 2022-01-04 | End: 2022-01-11 | Stop reason: HOSPADM

## 2022-01-03 RX ORDER — OMEPRAZOLE 20 MG/1
20 CAPSULE, DELAYED RELEASE ORAL DAILY
COMMUNITY
End: 2022-01-27

## 2022-01-03 RX ORDER — PANTOPRAZOLE SODIUM 40 MG/1
40 TABLET, DELAYED RELEASE ORAL
Status: DISCONTINUED | OUTPATIENT
Start: 2022-01-04 | End: 2022-01-11 | Stop reason: HOSPADM

## 2022-01-03 RX ORDER — VENLAFAXINE HYDROCHLORIDE 37.5 MG/1
37.5 CAPSULE, EXTENDED RELEASE ORAL
COMMUNITY
End: 2022-01-27

## 2022-01-03 RX ORDER — FOLIC ACID 1 MG/1
1 TABLET ORAL DAILY
COMMUNITY
End: 2022-02-16 | Stop reason: SDUPTHER

## 2022-01-03 RX ORDER — AMLODIPINE BESYLATE 5 MG/1
5 TABLET ORAL DAILY
Status: DISCONTINUED | OUTPATIENT
Start: 2022-01-04 | End: 2022-01-03

## 2022-01-03 RX ADMIN — VENLAFAXINE HYDROCHLORIDE 37.5 MG: 37.5 CAPSULE, EXTENDED RELEASE ORAL at 22:23

## 2022-01-03 RX ADMIN — IOHEXOL 100 ML: 350 INJECTION, SOLUTION INTRAVENOUS at 20:03

## 2022-01-03 RX ADMIN — ACETAMINOPHEN 650 MG: 325 TABLET, FILM COATED ORAL at 21:56

## 2022-01-03 RX ADMIN — METOPROLOL TARTRATE 25 MG: 25 TABLET, FILM COATED ORAL at 21:56

## 2022-01-03 RX ADMIN — POTASSIUM CHLORIDE 20 MEQ: 1500 TABLET, EXTENDED RELEASE ORAL at 21:57

## 2022-01-03 RX ADMIN — AMLODIPINE BESYLATE 5 MG: 5 TABLET ORAL at 23:46

## 2022-01-03 RX ADMIN — SODIUM CHLORIDE, SODIUM GLUCONATE, SODIUM ACETATE, POTASSIUM CHLORIDE, MAGNESIUM CHLORIDE, SODIUM PHOSPHATE, DIBASIC, AND POTASSIUM PHOSPHATE 75 ML/HR: .53; .5; .37; .037; .03; .012; .00082 INJECTION, SOLUTION INTRAVENOUS at 21:55

## 2022-01-03 RX ADMIN — SOTALOL HYDROCHLORIDE 80 MG: 80 TABLET ORAL at 22:23

## 2022-01-03 RX ADMIN — BUSPIRONE HYDROCHLORIDE 7.5 MG: 5 TABLET ORAL at 21:56

## 2022-01-03 RX ADMIN — PROCHLORPERAZINE MALEATE 10 MG: 5 TABLET ORAL at 21:56

## 2022-01-03 NOTE — TELEPHONE ENCOUNTER
Patients daughter Brian Valiente calling in voicemail was left for her to call   Offer to reschedule missed appointment 01/03 with her for patient advised she would call back at a later date to reschedule appointment

## 2022-01-03 NOTE — TELEPHONE ENCOUNTER
Reason for Disposition   Patient sounds very sick or weak to the triager    Answer Assessment - Initial Assessment Questions  1  LEVEL OF CONSCIOUSNESS: "How is he (she, the patient) acting right now?" (e g , alert-oriented, confused, lethargic, stuporous, comatose)      Out of sorts, has fallen 3-4 times today, wobbly and not really able to walk, stayed in bed longer, when asking questions she responds but "is just not quite there"     2  ONSET: "When did the confusion start?"  (minutes, hours, days)      This morning     3  PATTERN "Does this come and go, or has it been constant since it started?"  "Is it present now?"      Constant     4  ALCOHOL or DRUGS: "Has he been drinking alcohol or taking any drugs?"       Denies     5  NARCOTIC MEDICATIONS: "Has he been receiving any narcotic medications?" (e g , morphine, Vicodin)      Denies     6  CAUSE: "What do you think is causing the confusion?"       Unsure     7   OTHER SYMPTOMS: "Are there any other symptoms?" (e g , difficulty breathing, headache, fever, weakness)      Weakness, denies other known symptoms at this time but states she is out of sorts     /94    Temperature 97 2    Protocols used: CONFUSION - DELIRIUM-ADULT-OH

## 2022-01-03 NOTE — TELEPHONE ENCOUNTER
Brother called stated BP seems to be running high last few days, didn't write it down  Today 165/94 hr not taken  Seems to be ranging on the higher side    She had fallen a few times today  They did call the ambulance but she refused to go to hospital   No edema in legs/feet  No SOB    Please advise

## 2022-01-03 NOTE — TELEPHONE ENCOUNTER
Regarding: ELEVATE BP OUT OF SORTS FALLEN CONCERN   ----- Message from Daniel Morales sent at 1/3/2022  1:32 PM EST -----  Patient's brother called that she seems out of sorts today, she has fallen a few times she does not seem all with it  He provided the following -94 blood sugar 146  He is not sure if PCP would want her to go to ER they try to avoid it due to her cancer

## 2022-01-03 NOTE — TELEPHONE ENCOUNTER
Spoke with patient's brother and he reports that patient is very out of sorts, will respond but is "not quite there", is barely able to walk and has fallen 3-4 times today  Advised that patient should be evaluated in ER and if she is unable to walk an ambulance should be called  Patient's brother verbalized understanding

## 2022-01-04 ENCOUNTER — APPOINTMENT (INPATIENT)
Dept: CT IMAGING | Facility: HOSPITAL | Age: 72
DRG: 064 | End: 2022-01-04
Payer: MEDICARE

## 2022-01-04 ENCOUNTER — APPOINTMENT (OUTPATIENT)
Dept: RADIOLOGY | Facility: HOSPITAL | Age: 72
DRG: 064 | End: 2022-01-04
Payer: MEDICARE

## 2022-01-04 ENCOUNTER — APPOINTMENT (INPATIENT)
Dept: MRI IMAGING | Facility: HOSPITAL | Age: 72
DRG: 064 | End: 2022-01-04
Payer: MEDICARE

## 2022-01-04 PROBLEM — R91.8 GROUND GLASS OPACITY PRESENT ON IMAGING OF LUNG: Status: ACTIVE | Noted: 2022-01-04

## 2022-01-04 PROBLEM — R19.7 DIARRHEA: Status: ACTIVE | Noted: 2022-01-04

## 2022-01-04 PROBLEM — N39.0 URINARY TRACT INFECTION WITHOUT HEMATURIA: Status: ACTIVE | Noted: 2022-01-04

## 2022-01-04 PROBLEM — E83.42 HYPOMAGNESEMIA: Status: ACTIVE | Noted: 2022-01-04

## 2022-01-04 PROBLEM — R82.90 ABNORMAL URINALYSIS: Status: ACTIVE | Noted: 2022-01-04

## 2022-01-04 LAB
ANION GAP SERPL CALCULATED.3IONS-SCNC: 10 MMOL/L (ref 4–13)
ANION GAP SERPL CALCULATED.3IONS-SCNC: 11 MMOL/L (ref 4–13)
ATRIAL RATE: 58 BPM
BACTERIA UR QL AUTO: ABNORMAL /HPF
BASOPHILS # BLD AUTO: 0.06 THOUSANDS/ΜL (ref 0–0.1)
BASOPHILS # BLD AUTO: 0.07 THOUSANDS/ΜL (ref 0–0.1)
BASOPHILS NFR BLD AUTO: 1 % (ref 0–1)
BASOPHILS NFR BLD AUTO: 1 % (ref 0–1)
BILIRUB UR QL STRIP: NEGATIVE
BUN SERPL-MCNC: 13 MG/DL (ref 5–25)
BUN SERPL-MCNC: 14 MG/DL (ref 5–25)
CALCIUM SERPL-MCNC: 8.6 MG/DL (ref 8.3–10.1)
CALCIUM SERPL-MCNC: 8.9 MG/DL (ref 8.3–10.1)
CHLORIDE SERPL-SCNC: 106 MMOL/L (ref 100–108)
CHLORIDE SERPL-SCNC: 107 MMOL/L (ref 100–108)
CLARITY UR: ABNORMAL
CO2 SERPL-SCNC: 24 MMOL/L (ref 21–32)
CO2 SERPL-SCNC: 26 MMOL/L (ref 21–32)
COLOR UR: ABNORMAL
CREAT SERPL-MCNC: 1.45 MG/DL (ref 0.6–1.3)
CREAT SERPL-MCNC: 1.53 MG/DL (ref 0.6–1.3)
EOSINOPHIL # BLD AUTO: 0.01 THOUSAND/ΜL (ref 0–0.61)
EOSINOPHIL # BLD AUTO: 0.02 THOUSAND/ΜL (ref 0–0.61)
EOSINOPHIL NFR BLD AUTO: 0 % (ref 0–6)
EOSINOPHIL NFR BLD AUTO: 0 % (ref 0–6)
ERYTHROCYTE [DISTWIDTH] IN BLOOD BY AUTOMATED COUNT: 16.6 % (ref 11.6–15.1)
ERYTHROCYTE [DISTWIDTH] IN BLOOD BY AUTOMATED COUNT: 16.7 % (ref 11.6–15.1)
GFR SERPL CREATININE-BSD FRML MDRD: 33 ML/MIN/1.73SQ M
GFR SERPL CREATININE-BSD FRML MDRD: 35 ML/MIN/1.73SQ M
GLUCOSE SERPL-MCNC: 123 MG/DL (ref 65–140)
GLUCOSE SERPL-MCNC: 142 MG/DL (ref 65–140)
GLUCOSE SERPL-MCNC: 145 MG/DL (ref 65–140)
GLUCOSE SERPL-MCNC: 88 MG/DL (ref 65–140)
GLUCOSE SERPL-MCNC: 97 MG/DL (ref 65–140)
GLUCOSE UR STRIP-MCNC: NEGATIVE MG/DL
HCT VFR BLD AUTO: 32.7 % (ref 34.8–46.1)
HCT VFR BLD AUTO: 36 % (ref 34.8–46.1)
HGB BLD-MCNC: 10.2 G/DL (ref 11.5–15.4)
HGB BLD-MCNC: 11.3 G/DL (ref 11.5–15.4)
HGB UR QL STRIP.AUTO: ABNORMAL
IMM GRANULOCYTES # BLD AUTO: 0.05 THOUSAND/UL (ref 0–0.2)
IMM GRANULOCYTES # BLD AUTO: 0.06 THOUSAND/UL (ref 0–0.2)
IMM GRANULOCYTES NFR BLD AUTO: 1 % (ref 0–2)
IMM GRANULOCYTES NFR BLD AUTO: 1 % (ref 0–2)
KETONES UR STRIP-MCNC: NEGATIVE MG/DL
LEUKOCYTE ESTERASE UR QL STRIP: ABNORMAL
LYMPHOCYTES # BLD AUTO: 0.56 THOUSANDS/ΜL (ref 0.6–4.47)
LYMPHOCYTES # BLD AUTO: 0.79 THOUSANDS/ΜL (ref 0.6–4.47)
LYMPHOCYTES NFR BLD AUTO: 10 % (ref 14–44)
LYMPHOCYTES NFR BLD AUTO: 6 % (ref 14–44)
MAGNESIUM SERPL-MCNC: 1.3 MG/DL (ref 1.6–2.6)
MAGNESIUM SERPL-MCNC: 1.7 MG/DL (ref 1.6–2.6)
MCH RBC QN AUTO: 28.5 PG (ref 26.8–34.3)
MCH RBC QN AUTO: 28.7 PG (ref 26.8–34.3)
MCHC RBC AUTO-ENTMCNC: 31.2 G/DL (ref 31.4–37.4)
MCHC RBC AUTO-ENTMCNC: 31.4 G/DL (ref 31.4–37.4)
MCV RBC AUTO: 91 FL (ref 82–98)
MCV RBC AUTO: 92 FL (ref 82–98)
MONOCYTES # BLD AUTO: 0.57 THOUSAND/ΜL (ref 0.17–1.22)
MONOCYTES # BLD AUTO: 0.59 THOUSAND/ΜL (ref 0.17–1.22)
MONOCYTES NFR BLD AUTO: 6 % (ref 4–12)
MONOCYTES NFR BLD AUTO: 7 % (ref 4–12)
NEUTROPHILS # BLD AUTO: 6.77 THOUSANDS/ΜL (ref 1.85–7.62)
NEUTROPHILS # BLD AUTO: 7.81 THOUSANDS/ΜL (ref 1.85–7.62)
NEUTS SEG NFR BLD AUTO: 81 % (ref 43–75)
NEUTS SEG NFR BLD AUTO: 86 % (ref 43–75)
NITRITE UR QL STRIP: NEGATIVE
NON-SQ EPI CELLS URNS QL MICRO: ABNORMAL /HPF
NRBC BLD AUTO-RTO: 0 /100 WBCS
NRBC BLD AUTO-RTO: 0 /100 WBCS
P AXIS: 48 DEGREES
PH UR STRIP.AUTO: 5.5 [PH]
PLATELET # BLD AUTO: 159 THOUSANDS/UL (ref 149–390)
PLATELET # BLD AUTO: 183 THOUSANDS/UL (ref 149–390)
PMV BLD AUTO: 11 FL (ref 8.9–12.7)
PMV BLD AUTO: 12.1 FL (ref 8.9–12.7)
POTASSIUM SERPL-SCNC: 3.3 MMOL/L (ref 3.5–5.3)
POTASSIUM SERPL-SCNC: 4.1 MMOL/L (ref 3.5–5.3)
PR INTERVAL: 160 MS
PROT UR STRIP-MCNC: NEGATIVE MG/DL
QRS AXIS: -2 DEGREES
QRSD INTERVAL: 84 MS
QT INTERVAL: 468 MS
QTC INTERVAL: 460 MS
RBC # BLD AUTO: 3.56 MILLION/UL (ref 3.81–5.12)
RBC # BLD AUTO: 3.96 MILLION/UL (ref 3.81–5.12)
RBC #/AREA URNS AUTO: ABNORMAL /HPF
SODIUM SERPL-SCNC: 142 MMOL/L (ref 136–145)
SODIUM SERPL-SCNC: 142 MMOL/L (ref 136–145)
SP GR UR STRIP.AUTO: 1.01 (ref 1–1.03)
T WAVE AXIS: 76 DEGREES
UROBILINOGEN UR QL STRIP.AUTO: 0.2 E.U./DL
VENTRICULAR RATE: 58 BPM
WBC # BLD AUTO: 8.29 THOUSAND/UL (ref 4.31–10.16)
WBC # BLD AUTO: 9.07 THOUSAND/UL (ref 4.31–10.16)
WBC #/AREA URNS AUTO: ABNORMAL /HPF

## 2022-01-04 PROCEDURE — 82948 REAGENT STRIP/BLOOD GLUCOSE: CPT

## 2022-01-04 PROCEDURE — 93010 ELECTROCARDIOGRAM REPORT: CPT | Performed by: INTERNAL MEDICINE

## 2022-01-04 PROCEDURE — 70450 CT HEAD/BRAIN W/O DYE: CPT

## 2022-01-04 PROCEDURE — 97163 PT EVAL HIGH COMPLEX 45 MIN: CPT

## 2022-01-04 PROCEDURE — 87086 URINE CULTURE/COLONY COUNT: CPT | Performed by: SURGERY

## 2022-01-04 PROCEDURE — 99233 SBSQ HOSP IP/OBS HIGH 50: CPT | Performed by: SURGERY

## 2022-01-04 PROCEDURE — G1004 CDSM NDSC: HCPCS

## 2022-01-04 PROCEDURE — 80048 BASIC METABOLIC PNL TOTAL CA: CPT | Performed by: NURSE PRACTITIONER

## 2022-01-04 PROCEDURE — 73560 X-RAY EXAM OF KNEE 1 OR 2: CPT

## 2022-01-04 PROCEDURE — 97116 GAIT TRAINING THERAPY: CPT

## 2022-01-04 PROCEDURE — 87186 SC STD MICRODIL/AGAR DIL: CPT | Performed by: SURGERY

## 2022-01-04 PROCEDURE — 99223 1ST HOSP IP/OBS HIGH 75: CPT | Performed by: INTERNAL MEDICINE

## 2022-01-04 PROCEDURE — 85025 COMPLETE CBC W/AUTO DIFF WBC: CPT | Performed by: NURSE PRACTITIONER

## 2022-01-04 PROCEDURE — 81001 URINALYSIS AUTO W/SCOPE: CPT | Performed by: SURGERY

## 2022-01-04 PROCEDURE — 70551 MRI BRAIN STEM W/O DYE: CPT

## 2022-01-04 PROCEDURE — 99222 1ST HOSP IP/OBS MODERATE 55: CPT | Performed by: INTERNAL MEDICINE

## 2022-01-04 PROCEDURE — 87077 CULTURE AEROBIC IDENTIFY: CPT | Performed by: SURGERY

## 2022-01-04 PROCEDURE — 83735 ASSAY OF MAGNESIUM: CPT | Performed by: NURSE PRACTITIONER

## 2022-01-04 PROCEDURE — 97167 OT EVAL HIGH COMPLEX 60 MIN: CPT

## 2022-01-04 RX ORDER — HYDRALAZINE HYDROCHLORIDE 20 MG/ML
5 INJECTION INTRAMUSCULAR; INTRAVENOUS EVERY 4 HOURS PRN
Status: DISCONTINUED | OUTPATIENT
Start: 2022-01-04 | End: 2022-01-04

## 2022-01-04 RX ORDER — MAGNESIUM SULFATE HEPTAHYDRATE 40 MG/ML
2 INJECTION, SOLUTION INTRAVENOUS ONCE
Status: COMPLETED | OUTPATIENT
Start: 2022-01-04 | End: 2022-01-04

## 2022-01-04 RX ORDER — AMLODIPINE BESYLATE 5 MG/1
5 TABLET ORAL EVERY EVENING
Status: COMPLETED | OUTPATIENT
Start: 2022-01-04 | End: 2022-01-04

## 2022-01-04 RX ORDER — LOSARTAN POTASSIUM 50 MG/1
50 TABLET ORAL DAILY
Status: DISCONTINUED | OUTPATIENT
Start: 2022-01-04 | End: 2022-01-05

## 2022-01-04 RX ORDER — POTASSIUM CHLORIDE 20 MEQ/1
40 TABLET, EXTENDED RELEASE ORAL ONCE
Status: COMPLETED | OUTPATIENT
Start: 2022-01-04 | End: 2022-01-04

## 2022-01-04 RX ORDER — CIPROFLOXACIN 500 MG/1
500 TABLET, FILM COATED ORAL EVERY 12 HOURS SCHEDULED
Status: DISCONTINUED | OUTPATIENT
Start: 2022-01-04 | End: 2022-01-04

## 2022-01-04 RX ORDER — ACETAMINOPHEN 325 MG/1
650 TABLET ORAL EVERY 6 HOURS PRN
Refills: 0
Start: 2022-01-04 | End: 2022-02-16 | Stop reason: SDUPTHER

## 2022-01-04 RX ORDER — AMLODIPINE BESYLATE 10 MG/1
10 TABLET ORAL EVERY EVENING
Status: DISCONTINUED | OUTPATIENT
Start: 2022-01-05 | End: 2022-01-04

## 2022-01-04 RX ORDER — CEPHALEXIN 500 MG/1
500 CAPSULE ORAL EVERY 8 HOURS SCHEDULED
Status: DISCONTINUED | OUTPATIENT
Start: 2022-01-04 | End: 2022-01-04

## 2022-01-04 RX ORDER — MAGNESIUM SULFATE 1 G/100ML
1 INJECTION INTRAVENOUS ONCE
Status: DISCONTINUED | OUTPATIENT
Start: 2022-01-04 | End: 2022-01-04

## 2022-01-04 RX ORDER — HYDRALAZINE HYDROCHLORIDE 20 MG/ML
10 INJECTION INTRAMUSCULAR; INTRAVENOUS EVERY 6 HOURS PRN
Status: DISCONTINUED | OUTPATIENT
Start: 2022-01-04 | End: 2022-01-11 | Stop reason: HOSPADM

## 2022-01-04 RX ADMIN — FOLIC ACID 1 MG: 1 TABLET ORAL at 09:58

## 2022-01-04 RX ADMIN — POTASSIUM CHLORIDE 40 MEQ: 1500 TABLET, EXTENDED RELEASE ORAL at 06:32

## 2022-01-04 RX ADMIN — BUSPIRONE HYDROCHLORIDE 7.5 MG: 5 TABLET ORAL at 09:59

## 2022-01-04 RX ADMIN — MAGNESIUM SULFATE HEPTAHYDRATE 2 G: 40 INJECTION, SOLUTION INTRAVENOUS at 06:34

## 2022-01-04 RX ADMIN — HYDRALAZINE HYDROCHLORIDE 5 MG: 20 INJECTION, SOLUTION INTRAMUSCULAR; INTRAVENOUS at 19:46

## 2022-01-04 RX ADMIN — VENLAFAXINE HYDROCHLORIDE 37.5 MG: 37.5 CAPSULE, EXTENDED RELEASE ORAL at 21:45

## 2022-01-04 RX ADMIN — METOPROLOL TARTRATE 25 MG: 25 TABLET, FILM COATED ORAL at 19:54

## 2022-01-04 RX ADMIN — AMLODIPINE BESYLATE 5 MG: 5 TABLET ORAL at 21:47

## 2022-01-04 RX ADMIN — SOTALOL HYDROCHLORIDE 80 MG: 80 TABLET ORAL at 09:59

## 2022-01-04 RX ADMIN — VITAM B12 100 MCG: 100 TAB at 09:59

## 2022-01-04 RX ADMIN — LORATADINE 10 MG: 10 TABLET ORAL at 09:58

## 2022-01-04 RX ADMIN — SODIUM CHLORIDE 1000 ML: 0.9 INJECTION, SOLUTION INTRAVENOUS at 13:17

## 2022-01-04 RX ADMIN — LOSARTAN POTASSIUM 50 MG: 50 TABLET, FILM COATED ORAL at 21:45

## 2022-01-04 RX ADMIN — PANTOPRAZOLE SODIUM 40 MG: 40 TABLET, DELAYED RELEASE ORAL at 05:21

## 2022-01-04 RX ADMIN — METOPROLOL TARTRATE 25 MG: 25 TABLET, FILM COATED ORAL at 09:58

## 2022-01-04 RX ADMIN — Medication 2000 UNITS: at 09:58

## 2022-01-04 RX ADMIN — HYDRALAZINE HYDROCHLORIDE 5 MG: 20 INJECTION, SOLUTION INTRAMUSCULAR; INTRAVENOUS at 12:06

## 2022-01-04 RX ADMIN — ACETAMINOPHEN 650 MG: 325 TABLET, FILM COATED ORAL at 13:59

## 2022-01-04 RX ADMIN — APIXABAN 5 MG: 5 TABLET, FILM COATED ORAL at 18:18

## 2022-01-04 RX ADMIN — APIXABAN 5 MG: 5 TABLET, FILM COATED ORAL at 09:59

## 2022-01-04 RX ADMIN — BUSPIRONE HYDROCHLORIDE 7.5 MG: 5 TABLET ORAL at 21:48

## 2022-01-04 RX ADMIN — SOTALOL HYDROCHLORIDE 80 MG: 80 TABLET ORAL at 18:18

## 2022-01-04 RX ADMIN — ACETAMINOPHEN 650 MG: 325 TABLET, FILM COATED ORAL at 19:49

## 2022-01-04 RX ADMIN — ACETAMINOPHEN 650 MG: 325 TABLET, FILM COATED ORAL at 05:23

## 2022-01-04 RX ADMIN — AMLODIPINE BESYLATE 5 MG: 5 TABLET ORAL at 18:18

## 2022-01-04 NOTE — PLAN OF CARE
Problem: OCCUPATIONAL THERAPY ADULT  Goal: Performs self-care activities at highest level of function for planned discharge setting  See evaluation for individualized goals  Description: Treatment Interventions: ADL retraining,Functional transfer training,Endurance training,Patient/family training,Fine motor coordination activities,Compensatory technique education,Continued evaluation,Energy conservation,Activityengagement  Equipment Recommended: Shower/Tub chair with back ($),Bedside commode (use of RW at this time)       See flowsheet documentation for full assessment, interventions and recommendations  Note: Limitation: Decreased ADL status,Decreased cognition,Decreased endurance,Decreased self-care trans,Decreased high-level ADLs     Assessment: Pt is a 75yo female admitted to [facility] on 1/3/22  Pt presented as trauma following multiple falls at home  Significant PMH impacting her occupational performance includes a-fib, arthritis, DM II, HTN, stage IV lung cancer w/ mets, depression, R TKA, uterine ca hx/tx  Pt diagnosed w/ buttocks hematoma  Pt w/ active OT orders and activity orders  Personal factors impacting performance includes difficulty completing IADLs, increased pain  Pt reports living w/ her disabled brother and completes ADL on one floor  Pt reports having access to elevator, DME and RW  Pt reports I w/ ADL/ IADL PTA w/ out use of AD or DME  Upon eval, pt alert and generally oriented  Able to follow directions w/ + time and encouragement to challenge activity tolerance  Pt required min A to complete mobility supine to sit and initial sit to stand  Pt progressed to S following education on hand placement and use of RW  Pt required min A to complete UBD, LBD and toileting using commode due to urgency   Pt presents w/ increased pain, decreased activity tolerance, decreased endurance, decreased standing tolerance, decreased standing balance, generalized weakness / deconditioning, decreased L LE strength impacting her I w/ dressing, bathing, oral hygiene, functional mobility, functional transfers, activity engagement, clothing mgmt  Pt completing ADL below baseline level of I and would benefit from OT while in acute care to address deficits  Recommend DC home w/ Home and family support / assist as needed using RW for functional mobility   Will continue to follow     OT Discharge Recommendation: Home with home health rehabilitation (Home OT/PT)

## 2022-01-04 NOTE — H&P
[facility] [address] [facility] 1950, 66 y o  female MRN: [id number]  Unit/Bed#: FT 04 Encounter: [id number]  Primary Care Provider: Deborah Desai DO   Date and time admitted to hospital: 1/3/2022  7:30 PM    Multiple falls  Assessment & Plan  · Multiple falls over the past 24 hours  · Patient denies syncope or syncope type symptoms  Associates falls with left knee osteoarthritis and for replacement  Denies any new injury to knee  · Injuries listed below  · PT/OT    Buttocks Hematoma  Assessment & Plan  · CT imaging revealed Suspected small post traumatic hematoma measuring 2 cm in the soft tissues of the right buttock/perianal region  No active bleeding  No visceral or osseous injury identified  · May continue Eliquis due to no active extravasation  Repeat hemoglobin in AM  · Ice  · Tylenol as needed  · PT/OT    Atrial fibrillation (HCC)  Assessment & Plan  · Continue home Eliquis, metoprolol, sotalol    Hypokalemia  Assessment & Plan  · Potassium is 3 3  · Repleted with 20 mEq  · Repeat BMP in AM    Depression  Assessment & Plan  · Continue home BuSpar and Effexor  · Continue to follow with PCP and palliative care as an outpatient  Di[facility] type 2[facility]  Assessment & Plan  No results found for: HGBA1C    No results for input(s): POCGLU in the last 72 hours  Blood Sugar Average: Last 72 hrs:    · Last A1c 10/2021 was 6 8  · Will hold Tradjenta and metformin  · Initiate subcut insulin    Hypertension  Assessment & Plan  · Continue home amlodipine, metoprolol, sotalol  · Per daughter, patient was recently started on amlodipine  Will closely monitor BP  Patient is hypertensive on admission  Antihypertensive medications scheduled for this evening  Continue to monitor BP  · Hold home Lasix and losartan due to kidney functioning  · Follow-up with family medicine provider as an OP       Chronic kidney disease, stage 3 [facility])  Assessment & Plan  Lab Results Component Value Date    EGFR 27 01/03/2022    CREATININE 1 78 (H) 01/03/2022     · Kidney function has varied over the past several months, likely due to nutrition and chemotherapy associated nausea/vomiting  · Per most recent family medicine note from 10/2021, baseline creatinine is typically between 1 6-1 8  · Continue to monitor kidney function  · IVF  · Hold diuretics and ARB  Continue to monitor I&O and BP--reinitiate as appropriate  Contusion of left lower leg  Assessment & Plan  · XR negative for acute traumatic injury  · Ice and Tylenol as needed    H&P Exam - Trauma   Jenelle Garcia 66 y o  female MRN: [id number]  Unit/Bed#: FT 04 Encounter: [id number]    Assessment/Plan   Trauma Alert: Level B  Model of Arrival: Ambulance  Trauma Team: Attending Jim Boss and JOSE Lawrence  Consultants: None    Trauma Active Problems:  See above    Trauma Plan:  See above    Chief Complaint:  Falls    History of Present Illness   HPI:  Jenelle Garcia is a 66 y o  female with history of lung cancer that she is currently receiving chemotherapy for and atrial fibrillation that she takes Eliquis for daily, presenting today for evaluation after suffering multiple falls throughout the day  She associates the falls with ambulatory dysfunction secondary to left knee osteoarthritis and need for a knee replacement  She denies any dizziness/lightheadedness/syncope type symptoms  She did hit her head during several the falls  She denies a loss of consciousness  Patient's only complaint currently is right ankle pain  She states that she did not want to come to the hospital but her POA and other family members encouraged her to be evaluated  Mechanism:Fall    Review of Systems    12-point, complete review of systems was reviewed and negative except as stated above         Historical Information   Efforts to obtain history included the following sources: other medical personnel, obtained from other records     Medical history:  Atrial fibrillation, arthritis, diabetes type 2, hypertension, GERD, cancer of the uterus, hyperlipidemia, stage IV lung cancer, chemotherapy-induced nausea and vomiting, frozen shoulder, chronic kidney disease stage 3 insomnia, major depressive disorder, vitamin-D deficiency  Surgical history:  Hysterectomy, appendectomy, cholecystectomy, Mohs surgery, tonsillectomy, adenoidectomy, right knee replacement    Social:  Former smoker, no alcohol use, no drug use  There is no immunization history on file for this patient  Last Tetanus:  2018  Family History: Non-contributory  Unable to obtain/limited by patient      Meds/Allergies   all current active meds have been reviewed       Not on File      PHYSICAL EXAM    PE limited by:  None    Objective   Vitals:   First set: Temperature: (!) 97 4 °F (36 3 °C) (01/03/22 1930)  Pulse: 61 (01/03/22 1930)  Respirations: 18 (01/03/22 1930)  Blood Pressure: (!) 183/84 (01/03/22 1930)    Primary Survey:   (A) Airway:  Patent  (B) Breathing:  Clear to auscultation, bilaterally  (C) Circulation: Pulses:   normal, pedal  2/4 and radial  2/4  (D) Disabliity:  GCS Total:  15  (E) Expose:  Completed    Secondary Survey: (Click on Physical Exam tab above)  Physical Exam  Constitutional:       General: She is not in acute distress  Appearance: Normal appearance  HENT:      Head: Normocephalic  Right Ear: External ear normal       Left Ear: External ear normal       Nose: Nose normal       Mouth/Throat:      Mouth: Mucous membranes are moist       Pharynx: Oropharynx is clear  Eyes:      Conjunctiva/sclera: Conjunctivae normal       Pupils: Pupils are equal, round, and reactive to light  Comments: Pupils were 3 mm, equal, and briskly reactive bilaterally  Neck:      Comments: Cervical collar in place  Cardiovascular:      Rate and Rhythm: Normal rate and regular rhythm  Pulses: Normal pulses             Radial pulses are 2+ on the right side and 2+ on the left side  Dorsalis pedis pulses are 2+ on the right side and 2+ on the left side  Heart sounds: Normal heart sounds  Comments: EKG showed sinus rhythm  Pulmonary:      Effort: Pulmonary effort is normal       Breath sounds: Normal breath sounds and air entry  No decreased breath sounds, wheezing, rhonchi or rales  Chest:      Chest wall: No deformity, swelling or tenderness  Abdominal:      General: Abdomen is flat  Bowel sounds are normal       Palpations: Abdomen is soft  Tenderness: There is no abdominal tenderness  Genitourinary:     Comments: Pelvis stable  Musculoskeletal:      Right shoulder: Normal       Left shoulder: Normal       Right upper arm: Normal       Left upper arm: Normal       Right forearm: Normal       Left forearm: Normal       Right wrist: Normal       Left wrist: Normal       Right hand: Normal       Left hand: Normal       Cervical back: Normal       Thoracic back: Normal       Lumbar back: Normal       Right hip: Normal       Left hip: Normal       Right upper leg: Normal       Left upper leg: Normal       Right knee: Normal       Left knee: Crepitus present  No bony tenderness  No tenderness  Right lower leg: Tenderness present  Left lower leg: Normal       Right ankle: Normal       Left ankle: Normal       Right foot: Normal       Left foot: Normal         Legs:       Comments: Patient has full range of motion of left knee though palpable crepitus with ranging  Skin:     General: Skin is warm  Capillary Refill: Capillary refill takes less than 2 seconds  Findings: Abrasion and ecchymosis present  No laceration  Neurological:      Mental Status: She is alert and oriented to person, place, and time  GCS: GCS eye subscore is 4  GCS verbal subscore is 5  GCS motor subscore is 6  Sensory: Sensation is intact  Motor: Motor function is intact     Psychiatric:         Mood and Affect: Mood normal  Speech: Speech normal          Behavior: Behavior normal  Behavior is cooperative  Cognition and Memory: Cognition and memory normal          Invasive Devices  Report    Peripheral Intravenous Line            Peripheral IV 01/03/22 Right Antecubital <1 day                Lab Results: Results: I have personally reviewed pertinent reports  Imaging/EKG Studies: Results: I have personally reviewed pertinent reports      Other Studies:  None    Code Status: Level 3 - DNAR and DNI  Advance Directive and Living Will:      Power of :    POLST:

## 2022-01-04 NOTE — PROCEDURES
POC FAST US    Date/Time: 1/3/2022 7:59 PM  Performed by: EDUARDO Samano  Authorized by: Celena Samano     Patient location:  Trauma  Procedure details:     Exam Type:  Diagnostic    Indications: blunt abdominal trauma and blunt chest trauma      Assess for:  Intra-abdominal fluid and pericardial effusion    Technique: FAST      Views obtained:  Heart - Pericardial sac, LUQ - Splenorenal space, Suprapubic - Pouch of Erasto and RUQ - Farmer's Pouch    Image quality: diagnostic      Image availability:  Images available in PACS and video obtained  FAST Findings:     RUQ (Hepatorenal) free fluid: absent      LUQ (Splenorenal) free fluid: absent      Suprapubic free fluid: absent      Cardiac wall motion: identified      Pericardial effusion: absent    Interpretation:     Impressions: negative

## 2022-01-04 NOTE — ASSESSMENT & PLAN NOTE
· Multiple falls over the past 24 hours  · Patient denies syncope or syncope type symptoms  Associates falls with left knee osteoarthritis and for replacement  Denies any new injury to knee    · Injuries listed below  · PT/OT

## 2022-01-04 NOTE — CONSULTS
Consultation - Geriatric Medicine   [facility] 66 y o  female MRN: [id number]  Unit/Bed#: S -01 Encounter: [id number]        Inpatient consult to Gerontology for [address] performed by: Yan Pickernig MD  Consult ordered by: EDUARDO Richards            Assessment/Plan   1 -falls  -patient with history of multiple falls over the past they she associates this falls with her left knee osteoarthritis  Concurrently will follow with physical and occupational therapy  We will have patient have PT and OT  Patient will be placed a geriatric fall precaution protocol   - Assess patient frequently for physical needs, encourage use of assistant devices as needed and directed by PT/OT  -  Identify cognitive and physical deficits and behaviors that affect risk of falls  - consider moving patient closer to nursing station to monitor more closely for impulsive behavior which may increase risk of falls  -  [place] fall precautions   - Educate patient/family on patient safety including physical limitations and importance of using call bell for assistance   - Modify environment to reduce risk of injury including cap IV and disconnect from pole when not in use, ensuring adequate lighting in room and restroom, ensuring that path to restroom is clear and free of trip hazards  - PT/OT consulted to assist with strengthening/mobility and assist with discharge planning to appropriate level of care    2 -right buttock hematoma  -patient with a 2 cm soft tissue right buttock perianal region hematoma she had did add Eliquis for her atrial fibrillation  Local measures, pain management, PTOT  3 -paroxysmal atrial fibrillation -continue with Eliquis to reduce risk of stroke, metoprolol and sotalol for rate control  4 -hypertension  -patient currently on amlodipine, metoprolol, sotalol  Blood pressure systolic today in the 905H Will need to monitor closely  5 -chronic renal failure stage 3B  -current GFR 27 she is following with Nephrology trying to have better management of her hypertension  Patient is to maintain adequate hydration  6 -diabetes mellitus type 2 -I perceive by looking at her medication is that she has had metformin need to be careful with a low GFR of 27    7 -lung cancer stage IV -patient with previous right lower lobe infiltrate had follow-up studies including a CT scan which showed a right perihilar mass that measured 3 5 cm with subcarinal lymph nodes measuring 3 4 x 2 2 cm there was also a nodule in the left upper lobe that measured 2 by 1 1 cm  Also lytic lesions were seen involving the right 10th course of vertebral junction  Biopsy of the lesion showed a 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 positive for CK7, CK 19, CA 19-9, GA PA-3     8 -history uterine cancer in the year 2000 patient had a post hysterectomy did not require radiation or chemotherapy she had a bilateral oophorectomy she had a bilateral oophorectomy  9 -osteoarthritis of left knee -this seems to be the underlying problem at present for her multiple falls would recommend a steroid injection prior to leaving to improve her function  She has found relief for approximately 3 months in the past from previous injections  10 -hypokalemia -suspect this is secondary to her loose bowels  Might need ongoing supplementation of potassium  Recommendations    1 -renew blood pressure medications    2 -would be careful she is using metformin with her low GFR family physician to review  3 -consider injection with Kenalog to left knee to give her relief    4 -recommended that she use her walker on a regular basis to give her more stability         History of Present Illness   Physician Requesting Consult: Uma Cotto DO  Reason for Consult / Principal Problem:  Fall  Hx and PE limited by:  No limitations  Additional history obtained from:  Patient      HPI: Jenelle Garcia is a 66 y o  year old  female who presents with a history of a cattle fall  Patient has history of osteoarthritis of left knee which has given her increased pain and discomfort and has made her gait unstable  She is not a candidate for knee replacement given the fact that she has stage IV lung cancer and currently undergoing active chemotherapy  The patient apparently fell she denied any history of any presyncopal aura  She did diet hitting her head  The patient currently also has paroxysmal atrial fibrillation at has bed at [facility]  The patient did complain of right ankle pain  The patient lives in a 1 floor environment with her brother who is physically impaired and needs assistance he has lumbar girdle dystrophy  Currently he has a cousin that helps him  Bonner other active issue at present is a history of diarrhea she has about 4 episodes a day  Review of Systems   Constitutional: Negative  HENT: Negative  Eyes: Negative  Respiratory: Negative  Cardiovascular: Negative  Gastrointestinal: Positive for diarrhea  Endocrine:        Patient with history of diabetes   Genitourinary: Negative  Musculoskeletal: Positive for gait problem  History of osteoarthritis of left knee   Skin: Positive for pallor  Allergic/Immunologic: Negative  Hematological: Negative  Psychiatric/Behavioral: Negative          Memory/Cognitive screening:  Cognition is excellent  Mobility:  Mobility has been somewhat impaired because of her left knee pain  Falls:  She has had multiple falls recently  Assistive Devices:  She does have a walker at home  [place]:  No evidence of frailty  Nutrition/weight loss/grocery shopping/meal preparation:  No evidence of decreased appetite  Vision impairment:  No evidence of visual impairment  Hearing impairment:  No evidence of hearing impairment  Incontinence:  No history of urinary incontinence  Delirium:  No previous history of delirium  Polypharmacy:   No current facility-administered medications on file prior to encounter  Current Outpatient Medications on File Prior to Encounter   Medication Sig Dispense Refill    amLODIPine (NORVASC) 5 mg tablet Take 5 mg by mouth daily      apixaban (Eliquis) 5 mg Take 5 mg by mouth 2 (two) times a day      busPIRone (BUSPAR) 7 5 mg tablet Take 7 5 mg by mouth 2 (two) times a day      cholecalciferol (VITAMIN D3) 1,000 units tablet Take 2,000 Units by mouth daily      cyanocobalamin (VITAMIN B-12) 100 MCG tablet Take 100 mcg by mouth daily      folic acid (FOLVITE) 1 mg tablet Take 1 mg by mouth daily      linaGLIPtin (Tradjenta) 5 MG TABS Take 5 mg by mouth 2 (two) times a day      loratadine (CLARITIN) 10 mg tablet Take 10 mg by mouth daily      losartan (COZAAR) 50 mg tablet Take 50 mg by mouth daily      metFORMIN (GLUCOPHAGE) 1000 MG tablet Take 1,000 mg by mouth 2 (two) times a day with meals      metoprolol tartrate (LOPRESSOR) 25 mg tablet Take 25 mg by mouth every 12 (twelve) hours      omeprazole (PriLOSEC) 20 mg delayed release capsule Take 20 mg by mouth daily      prochlorperazine (COMPAZINE) 10 mg tablet Take 10 mg by mouth every 12 (twelve) hours as needed for nausea or vomiting      sotalol (BETAPACE) 80 mg tablet Take 80 mg by mouth 2 (two) times a day      venlafaxine (EFFEXOR-XR) 37 5 mg 24 hr capsule Take 37 5 mg by mouth daily at bedtime      furosemide (LASIX) 40 mg tablet Take 40 mg by mouth 2 (two) times a day       Patients primary residence:  Patient lives in her home Lives with:  Lives with her disabled brother  iADL's:  Patient enjoys independent activities of daily living  ADL's:  Patient enjoys her basic activities of daily living    Historical Information   Past medical history:    History of paroxysmal atrial fib, diabetes mellitus type 2, lung cancer stage IV, dyslipidemia, history uterine cancer  Past surgical history:  Appendectomy, bronchoscopy, cholecystectomy, colonoscopy, hysterectomy, joint replacement, laryngoscopy, diagnostic bronchoscopy, tonsillectomy and adenoidectomy, total right knee arthroplasty                                                                                                                                                     Social history:  She is  had 2 children used to be an active smoker quit 25 years ago  Family history: Mother  from heart disease in her early [de-identified] father had a stroke  in his 80s    Meds/Allergies   All current active meds have been reviewed  Not on File    Objective   Vitals:    22 0700   BP: (!) 172/70   Pulse: (!) 52   Resp: 16   Temp: 97 7 °F (36 5 °C)   SpO2: 98%       Intake/Output Summary (Last 24 hours) at 2022 1051  Last data filed at 2022 0645  Gross per 24 hour   Intake 662 5 ml   Output --   Net 662 5 ml     Invasive Devices  Report    Peripheral Intravenous Line            Peripheral IV 22 Right Antecubital <1 day                Physical Exam  Constitutional:       Appearance: She is obese  HENT:      Head: Normocephalic and atraumatic  Right Ear: Ear canal and external ear normal       Left Ear: Ear canal and external ear normal       Nose: Nose normal       Mouth/Throat:      Mouth: Mucous membranes are moist       Pharynx: Oropharynx is clear  Eyes:      Extraocular Movements: Extraocular movements intact  Conjunctiva/sclera: Conjunctivae normal       Pupils: Pupils are equal, round, and reactive to light  Cardiovascular:      Rate and Rhythm: Normal rate and regular rhythm  Pulses: Normal pulses  Heart sounds: Normal heart sounds  Pulmonary:      Effort: Pulmonary effort is normal       Breath sounds: Normal breath sounds  Abdominal:      General: Abdomen is flat  Palpations: Abdomen is soft     Musculoskeletal:      Cervical back: Normal range of motion  Comments: Patient with increased discomfort in left knee   Skin:     General: Skin is warm  Neurological:      General: No focal deficit present  Mental Status: She is alert and oriented to person, place, and time  Mental status is at baseline  Psychiatric:         Mood and Affect: Mood normal          Thought Content: Thought content normal          Judgment: Judgment normal          Lab Results:   I have personally reviewed pertinent lab and imaging results       VTE Prophylaxis:  Patient of Eliqu    Code Status: Level 3 - DNAR and DNI  Advance Directive and Living Will:      Power of :    POLST:      Family and Social Support:  Patient has 2 children the son is very supportive  No data recorded

## 2022-01-04 NOTE — OCCUPATIONAL THERAPY NOTE
Occupational Therapy Evaluation     Patient Name: Leah Collier  LPNEQ'Y Date: 1/4/2022  Problem List  Principal Problem:    Buttocks Hematoma  Active Problems:    Multiple falls    Contusion of left lower leg    L[facility] ([facility] )    Chronic kidney disease, stage 3 (HCC)    Hypertension    Diabetes type 2, controlled ([facility] )    Depression    Hypokalemia    Atrial fibrillation ([facility] )    Hypomagnesemia    Past Medical History  History reviewed  No pertinent past medical history  Past Surgical History  History reviewed  No pertinent surgical history  01/04/22 0836   OT Last Visit   OT Visit Date 01/04/22  (Tuesday)   Note Type   Note type Evaluation   Restrictions/Precautions   Weight Bearing Precautions Per Order No   Other Precautions Cognitive; Chair Alarm; Bed Alarm; Fall Risk;Pain  (Som Prakash on room air)   Pain Assessment   Pain Assessment Tool 0-10   Pain Score No Pain   Home Living   Type of Home House  (0 MARY JANE)   Home Layout Two level;Elevator;Performs ADLs on one level; Able to live on main level with bedroom/bathroom   Bathroom Shower/Tub Walk-in shower   Bathroom Toilet Standard   Bathroom Equipment Grab bars in shower; Shower chair;Grab bars around toilet   [address] Walker;Cane;Grab bars; Other (Comment); Mechanical lift  (not using AD PTA)   Additional Comments Pt reports living w/ her handcapped brother   Prior Function   Level of Ward Independent with ADLs and functional mobility   Lives With Family; Other (Comment)  (brother w/ diability)   Receives Help From Family; Other (Comment)  (cousin staying w/ pt and brother to assist brother)   ADL Assistance Independent   IADLs Independent  (+ drive)   Falls in the last 6 months 5 to 8   Vocational Retired   Comments Pt reports I w/ ADL PTA w/ out use of AD or DME   Pt added that her cousin in staying w/ them to assist her brother due to pt's activity restrictions s/p port placement   Lifestyle   Autonomy Pt reports I w/ ADL/ IADL PTA / out use of AD or DME  Pt reports having access to AD, DME PRN   Reciprocal Relationships Pt reports living w/ disabled brother and cousing staying w/ them   Service to Others Pt reports retired and worked for [employer] as    Intrinsic Gratification Pt reports enjoying crafts and sewing   Psychosocial   Patient Behaviors/Mood Cooperative;Flat affect  (flat affect, slow to respond at times)   ADL   Where Assessed Edge of bed  (vs commode)   Eating Assistance 6  Modified independent   Eating Deficit Setup   Grooming Assistance 5  Supervision/Setup  (seated on commode)   Grooming Deficit Setup;Supervision/safety; Increased time to complete   UB Bathing Assistance Unable to assess   LB Bathing Assistance Unable to assess   UB Dressing Assistance 4  Minimal Assistance   UB Dressing Deficit Setup; Increased time to complete;Verbal cueing;Pull around back; Fasteners   LB Dressing Assistance 4  Minimal Assistance   LB Dressing Deficit Setup; Thread RLE into underwear; Thread LLE into underwear;Pull up over hips; Requires assistive device for steadying; Increased time to complete;Supervision/safety;Verbal cueing   Toileting Assistance  4  Minimal Assistance   Toileting Deficit Setup; Increased time to complete; Bedside commode;Supervison/safety;Verbal cueing;Clothing management up;Clothing management down   Additional Comments able to follow directions during ADLs w/ + time  Slow to respond but generally oriented   Bed Mobility   Supine to Sit 4  Minimal assistance   Additional items Assist x 1;HOB elevated; Bedrails; Increased time required;Verbal cues   Sit to Supine Unable to assess   Additional Comments Pt seated OOB in chair post eval w/ needs met, call bell in reach and chair alarm activated   Transfers   Sit to Stand 4  Minimal assistance  (initially min A --> S)   Additional items Assist x 1;Bedrails;Armrests; Increased time required;Verbal cues  (instruction for hand placement)   Stand to Sit 4  Minimal assistance  (initially min A --> S additional trials)   Additional items Assist x 1;Bedrails;Armrests; Increased time required;Verbal cues   Toilet transfer 4  Minimal assistance   Additional items Assist x 1; Increased time required;Verbal cues; Commode  (commode due to urgency)   Functional Mobility   Functional Mobility 4  Minimal assistance   Additional Comments min HHA few feet from EOB to chair  Trial use of RW and pt progressed to S  + time   Additional items Rolling walker   Balance   Static Sitting Good   Static Standing [address] -  (using RW)   Activity Tolerance   Activity Tolerance Patient limited by fatigue;Patient limited by pain   Medical Staff Made Aware care coordination w/ PT, Nicci Lopez  Spoke to [place], [employer]   Nurse Made Aware per RNAmy appropriate to see pt   RUE Assessment   RUE Assessment WFL   RUE Strength   RUE Overall Strength Within Functional Limits - able to perform ADL tasks with strength; Other (Comment)  (observed during ADLs)   LUE Assessment   LUE Assessment WFL   LUE Strength   LUE Overall Strength Within Functional Limits - able to perform ADL tasks with strength  (observed during ADLs)   Hand Function   Gross Motor Coordination Functional   Fine Motor Coordination Functional  (R hand dominance)   Sensation   Light Touch Not tested   Sharp/Dull Not tested   Additional Comments Responded to light touch B UE   Cognition   Overall Cognitive Status Impaired   Arousal/Participation Alert; Cooperative   Attention Attends with cues to redirect   Orientation Level Oriented X4   Memory Decreased recall of recent events  (+ time, cues; slow to respond)   Following Commands Follows one step commands with increased time or repetition   Comments Identified pt by full name and birthdate  Alert and generally oriented w/ flat affect  Slow to respond  Able to provide home set- up and follow directions w/ encouragement to challenge activity tolerance   Recommend ongoing eval of functional cognitive skills to assist in DC Planning   Assessment   Limitation Decreased ADL status; Decreased cognition;Decreased endurance;Decreased self-care trans;Decreased high-level ADLs   Assessment Pt is a 75yo female [facility] to [facility] on 1/3/22  Pt presented as trauma following multiple falls at home  Significant PMH impacting her occupational performance includes a-fib, arthritis, DM II, HTN, stage IV lung cancer w/ mets, depression, R TKA, uterine ca hx/tx  Pt diagnosed w/ buttocks hematoma  Pt w/ active OT orders and activity orders  Personal factors impacting performance includes difficulty completing IADLs, increased pain  Pt reports living w/ her disabled brother and completes ADL on one floor  Pt reports having access to elevator, DME and RW  Pt reports I w/ ADL/ IADL PTA w/ out use of AD or DME  Upon eval, pt alert and generally oriented  Able to follow directions w/ + time and encouragement to challenge activity tolerance  Pt required min A to complete mobility supine to sit and initial sit to stand  Pt progressed to S following education on hand placement and use of RW  Pt required min A to complete UBD, LBD and toileting using commode due to urgency  Pt presents w/ increased pain, decreased activity tolerance, decreased endurance, decreased standing tolerance, decreased standing balance, generalized weakness / deconditioning, decreased L LE strength impacting her I w/ dressing, bathing, oral hygiene, functional mobility, functional transfers, activity engagement, clothing mgmt  Pt completing ADL below baseline level of I and would benefit from OT while in acute care to address deficits  Recommend DC home w/ Home and family support / assist as needed using RW for functional mobility  Will continue to follow   Goals   Patient Goals Pt stated that she would like to go home and relax/ rest     Plan   Treatment Interventions ADL retraining;Functional transfer training; Endurance training;Patient/family training;Fine motor coordination activities; Compensatory technique education;Continued evaluation; Energy conservation; Activityengagement   Goal Expiration Date 01/14/22   OT Frequency 3-5x/wk   Recommendation   OT Discharge Recommendation Home with home health rehabilitation  (Home OT/PT)   Equipment Recommended Shower/Tub chair with back ($);Bedside commode  (use of RW at this time)   Commode Type Standard   AM-PAC Daily Activity Inpatient   Lower Body Dressing 3   Bathing 2   Toileting 3   Upper Body Dressing 3   Grooming 4   Eating 4   Daily Activity Raw Score 19   Daily Activity Standardized Score (Calc for Raw Score >=11) 40 22   AM-[facility] Applied Cognition Inpatient   Following a Speech/Presentation 3   Understanding Ordinary Conversation 4   Taking Medications 3   Remembering Where Things Are Placed or Put Away 4   Remembering List of 4-5 Errands 3   Taking Care of Complicated Tasks 3   Applied Cognition Raw Score 20   Applied Cognition Standardized Score 41 76   Barthel Index   Feeding 10   Bathing 0   Grooming Score 5   Dressing Score 5   Bladder Score 5   Bowels Score 10   Toilet Use Score 5   Transfers (Bed/Chair) Score 10   Mobility (Level Surface) Score 0   Stairs Score 0   Barthel Index Score 50   Modified Patricia Scale   Modified [place] Scale 4     The patient's raw score on the AM-PAC Daily Activity inpatient short form is 19, standardized score is 40 22, greater than 39 4  Patients at this level are likely to benefit from discharge to home  Please refer to the recommendation of the Occupational Therapist for safe discharge planning      Pt goals to be met by 1/14/22:  -Pt will demonstrate good attention and participation in continued evaluation to assess functional cognitive skills to assist in DC planning    -Pt will consistently follow multi - step directions during ADLs w/ no more than 1 cue / prompt to max I w/ ADLs and return home    -Pt will complete bed mobility supine <> sit w/ mod I to max I w/ ADLs and return home    -Pt will complete UBD w/ mod I after set- up    -Pt will complete LBD w/ S using LHAE as needed to max I w/ ADLs and return home    -Pt will demonstrate improved functional standing tolerance for at least 10 minutes using AD as needed w/ at least fair + balance to max I w/ food prep and functional mobility to return home    -Pt will consistently engage in functional mobility household distances using AD w/ S to max I w/ ADLs and return home    -Pt will demonstrate good attention and understanding EC tech to max I and improve engagement to return home w/ family       Danay Enamorado, OTR/L

## 2022-01-04 NOTE — PLAN OF CARE
Problem: PHYSICAL THERAPY ADULT  Goal: Performs mobility at highest level of function for planned discharge setting  See evaluation for individualized goals  Description: Treatment/Interventions: Functional transfer training,LE strengthening/ROM,Therapeutic exercise,Endurance training,Cognitive reorientation,Patient/family training,Equipment eval/education,Bed mobility,Gait training  Equipment Recommended: Eladia Moser       See flowsheet documentation for full assessment, interventions and recommendations  1/4/2022 1036 by Funmilayo Mustafa PT  Note: Prognosis: Fair  Problem List: Decreased strength,Decreased endurance,Impaired balance,Decreased mobility,Decreased cognition,Decreased safety awareness,Obesity  Assessment: Rosi Maloney is a 66 y o  Female who [facility] on 1/3/2022 from home w/ c/o multiple falls and diagnosis of buttocks hematoma  Orders for PT eval and treat received, fall precautions  Pt presents w/ comorbidities of Lung cancer, DMII, HTN, CKD Stage 3, Afib, depresion  At baseline, pt mobilizes independently w/ no AD, and reports 5 falls in the last 6 months  Upon evaluation, pt presents w/ the following deficits: weakness, impaired balance, decreased endurance and decreased cognition  Pt requires min A x 1 for bed mobility, min A x 1 for transfers, and CGA for gait  Pt's clinical presentation is unstable/unpredictable due to need for assist w/ all phases of mobility when usually mobilizing independently, need for input for task focus and mobility technique, recent drastic decline in mobility compared to baseline and recent history of falls  Pt is at an increased risk of falls due to physical and cognitive deficits  Given the above findings, discharge recommendation is for Home w/ HHPT and family support   During this admission, pt would benefit from continued skilled inpatient PT in the acute care setting in order to address the abovementioned deficits to maximize function and mobility before DC from acute care  PT Discharge Recommendation: Home with home health rehabilitation          See flowsheet documentation for full assessment

## 2022-01-04 NOTE — ASSESSMENT & PLAN NOTE
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 scapula involvement diagnosed on 08/2016 " She has received various treatments since then  [place] disease was reported to have progressed on 8/2018 and again on 07/2021  Her current treatment includes Nivolumab 240 mg flat dose every 3 weeks and  carboplatin AUC 5 added to  pemetrexed which was dose reduced to 250 milligram/meter squared, initiated 8/2/2021  · 01/04/2022 CT CAP appears showed decreasing size of lung tumors  · Next treatment is this Friday  Appetite has been poor, possibly contributing to current kidney functioning  · Continue Compazine as needed for nausea  · Continue treatment and as scheduled follow-up with oncology

## 2022-01-04 NOTE — ASSESSMENT & PLAN NOTE
Lab Results   Component Value Date    EGFR 33 01/04/2022    EGFR 27 01/03/2022    CREATININE 1 53 (H) 01/04/2022    CREATININE 1 78 (H) 01/03/2022     · Kidney function has varied over the past several months, likely due to nutrition and chemotherapy associated nausea/vomiting  · Per most recent family medicine note from 10/2021, baseline creatinine is typically between 1 6-1 8  · Continue to monitor kidney function  · Improved this morning- 1 5  Continue IVF  · Hold diuretics and ARB  Continue to monitor I&O and BP--reinitiate as appropriate

## 2022-01-04 NOTE — QUICK NOTE
Cervical Collar Clearance: The patient had a CT scan of the cervical spine demonstrating no acute injury  On exam, the patient had no midline point tenderness or paresthesias/numbness/weakness in the extremities  The patient had full range of motion (was then able to flex, extend, and rotate head laterally) without pain  There were no distracting injuries and the patient was not intoxicated  The patient's cervical spine was cleared radiologically and clinically  Cervical collar removed at this time       EDUARDO Hernandez  1/3/2022 8:56 PM

## 2022-01-04 NOTE — PROGRESS NOTES
[facility] [place]) Progress Note Mayra Mo 1950, 66 y o  female MRN: [id number]  Unit/Bed#: S -Lianet Encounter: [id number]  Primary Care Provider: Tiera Noriega DO   Date and time admitted to hospital: 1/3/2022  7:30 PM    Multiple falls  Assessment & Plan  · Multiple falls over the past 24 hours  · Patient denies syncope or syncope type symptoms  Associates falls with left knee osteoarthritis and for replacement  Denies any new injury to knee  · Injuries listed below  · PT/OT    Buttocks Hematoma  Assessment & Plan  · CT imaging revealed Suspected small post traumatic hematoma measuring 2 cm in the soft tissues of the right buttock/perianal region  No active bleeding  No visceral or osseous injury identified  · Continued Eliquis  · Hemoglobin 10 2 this morning, down from 11 1-no sign of hemodynamic instability or active bleeding  · Ice  · Tylenol as needed  · PT/OT    Lung cancer [facility])  Assessment & Plan  · Per her most recent office visit with her oncologist, Dr Wallace Heaton, 12/13/2021- "Stage 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 of the lung with left scapula involvement diagnosed on 08/2016 " She has received various treatments since then  [place] disease was reported to have progressed on 8/2018 and again on 07/2021  Her current treatment includes Nivolumab 240 mg flat dose every 3 weeks and  carboplatin AUC 5 added to  pemetrexed which was dose reduced to 250 milligram/meter squared, initiated 8/2/2021  · 01/04/2022 CT CAP appears showed decreasing size of lung tumors  · Next treatment is this Friday  Appetite has been poor, possibly contributing to current kidney functioning  · Continue Compazine as needed for nausea  · Continue treatment and as scheduled follow-up with oncology      Hypomagnesemia  Assessment & Plan  · Repleted  · Recheck at 10AM     Atrial fibrillation [facility])  Assessment & Plan  · Continue home Eliquis, metoprolol, sotalol    Hypokalemia  Assessment & Plan  · Potassium remains low this morning at 3 3  · Repleted with 40 mEq  · Repeat BMP at 10AM    Depression  Assessment & Plan  · Continue home BuSpar and Effexor  · Continue to follow with PCP and palliative care as an outpatient  Diabetes type 2, controlled ([facility] [address] )  Assessment & Plan  No results found for: HGBA1C    No results for input(s): POCGLU in the last 72 hours  Blood Sugar Average: Last 72 hrs:    · Last A1c 10/2021 was 6 8  · Will hold Tradjenta and metformin  · Initiate subcut insulin    Hypertension  Assessment & Plan  · Continue home amlodipine, metoprolol, sotalol  · Per daughter, patient was recently started on amlodipine  Will closely monitor BP  Patient is hypertensive on admission, SBP >220  Improved with PM antihypertensives  Continue to monitor BP  · Hold home Lasix and losartan due to kidney functioning  · Hydralazine as needed  · Follow-up with family medicine provider as an OP  Chronic kidney disease, stage 3 [facility])  Assessment & Plan  Lab Results   Component Value Date    EGFR 33 01/04/2022    EGFR 27 01/03/2022    CREATININE 1 53 (H) 01/04/2022    CREATININE 1 78 (H) 01/03/2022     · Kidney function has varied over the past several months, likely due to nutrition and chemotherapy associated nausea/vomiting  · Per most recent family medicine note from 10/2021, baseline creatinine is typically between 1 6-1 8  · Continue to monitor kidney function  · Improved this morning- 1 5  Continue IVF  · Hold diuretics and ARB  Continue to monitor I&O and BP--reinitiate as appropriate  Contusion of left lower leg  Assessment & Plan  · XR negative for acute traumatic injury    · Ice and Tylenol as needed        TERTIARY TRAUMA SURVEY NOTE    Prophylaxis: Sequential compression device (Venodyne)  Eliquis    Disposition:  Pending PT/OT geriatrics, repeat labs--likely discharge home    Code status:  Level 3 - DNAR and DNI    Consultants:  PT/OT, Case Management geriatrics      SUBJECTIVE:     Mechanism of Injury:Fall    Chief Complaint:  Headache    HPI/Last 24 hour events: This is a 25-year-old female that suffered multiple falls yesterday  She was found have a small hematoma on her buttocks, without active bleeding  PT/OT eval pending  Patient's only complaint this morning is a posterior headache  Patient states that she chronically gets this headache  Headache was improved with Tylenol, last night, but it returned this morning  Patient denies any associated symptoms  She also notes some right buttocks soreness, but otherwise denies any complaints  A 12 point review of systems was completed and is negative not otherwise mentioned above      Active medications:           Current Facility-Administered Medications:     acetaminophen (TYLENOL) tablet 650 mg, 650 mg, Oral, Q6H PRN, 650 mg at 01/04/22 0523    amLODIPine (NORVASC) tablet 5 mg, 5 mg, Oral, QPM, 5 mg at 01/03/22 2346    apixaban (ELIQUIS) tablet 5 mg, 5 mg, Oral, BID    busPIRone (BUSPAR) tablet 7 5 mg, 7 5 mg, Oral, BID, 7 5 mg at 01/03/22 2156    cholecalciferol (VITAMIN D3) tablet 2,000 Units, 2,000 Units, Oral, Daily    cyanocobalamin (VITAMIN B-12) tablet 100 mcg, 100 mcg, Oral, Daily    folic acid (FOLVITE) tablet 1 mg, 1 mg, Oral, Daily    hydrALAZINE (APRESOLINE) injection 5 mg, 5 mg, Intravenous, Q4H PRN    insulin lispro (HumaLOG) 100 units/mL subcutaneous injection 1-6 Units, 1-6 Units, Subcutaneous, TID AC **AND** Fingerstick Glucose (POCT), , , TID AC    loratadine (CLARITIN) tablet 10 mg, 10 mg, Oral, Daily    magnesium sulfate 2 g/50 mL IVPB (premix) 2 g, 2 g, Intravenous, Once    metoprolol tartrate (LOPRESSOR) tablet 25 mg, 25 mg, Oral, Q12H STEPHIE, 25 mg at 01/03/22 2156    multi-electrolyte (PLASMALYTE-A/ISOLYTE-S PH 7 4) IV solution, 75 mL/hr, Intravenous, Continuous, 75 mL/hr at 01/03/22 2155    pantoprazole (PROTONIX) EC tablet 40 mg, 40 mg, Oral, Early Morning, 40 mg at 01/04/22 0521    potassium chloride (K-DUR,KLOR-CON) CR tablet 40 mEq, 40 mEq, Oral, Once    prochlorperazine (COMPAZINE) tablet 10 mg, 10 mg, Oral, Q12H PRN, 10 mg at 01/03/22 2156    sotalol (BETAPACE) tablet 80 mg, 80 mg, Oral, BID, 80 mg at 01/03/22 2223    venlafaxine (EFFEXOR-XR) 24 hr capsule 37 5 mg, 37 5 mg, Oral, HS, 37 5 mg at 01/03/22 2223      OBJECTIVE:     Vitals:   Vitals:    01/04/22 0427   BP: 159/68   Pulse:    Resp:    Temp: 97 8 °F (36 6 °C)   SpO2:        Physical Exam:   GENERAL APPEARANCE:  No acute distress  NEURO:  GCS is 15  Light touch sensation intact throughout  HEENT:  Normocephalic, atraumatic  Neck supple  CV: RRR, +2 radial and dorsalis pedis pulses, bilaterally  LUNGS:  Clear to auscultation  No orthopnea  No tachypnea  GI:  Abdomen is soft nontender  :  Pelvis stable  MSK:  Moving all extremities  No deformity  Mild tenderness noted on medial aspect of right buttocks  SKIN:  Warm, dry, well perfused  Ecchymosis noted on the medial aspect of right buttocks  1  Before the illness or injury that brought you to the Emergency, did you need someone to help you on a regular basis? 0=No   2  Since the illness or injury that brought you to the Emergency, have you needed more help than usual to take care of yourself? 1=Yes   3  Have you been hospitalized for one or more nights during the past 6 months (excluding a stay in the Emergency Department)? 0=No   4  In general, do you see well? 0=Yes   5  In general, do you have serious problems with your memory? 0=No   6  Do you take more than three different medications everyday? 1=Yes   TOTAL   2     Did you order a geriatric consult if the score was 2 or greater?: yes                I/O:   I/O     None          Invasive Devices:    Invasive Devices  Report    Peripheral Intravenous Line            Peripheral IV 01/03/22 Right Antecubital <1 day                  Imaging:   TRAUMA - CT head wo contrast    Addendum Date: 1/3/2022    ADDENDUM: Comparison was made to a previous brain MRI performed under separate medical record number on November 1, 2021  I personally discussed this study with Erin Moseley on 1/3/2022 at 8:43 PM     Result Date: 1/3/2022  Impression: No acute intracranial abnormality  Workstation performed: [id number]     TRAUMA - CT spine cervical wo contrast    Addendum Date: 1/3/2022    ADDENDUM: I personally discussed this study with Erin Moseley on 1/3/2022 at 8:43 PM      Result Date: 1/3/2022  Impression: No cervical spine fracture or traumatic malalignment  Workstation performed: [id number]     TRAUMA - CT chest abdomen pelvis w contrast    Result Date: 1/3/2022  Impression: Suspected small post traumatic hematoma measuring 2 cm in the soft tissues of the right buttock/perianal region  No active bleeding  No visceral or osseous injury identified  Scattered groundglass opacities in both lungs which are new from prior exam   Consider infectious or inflammatory etiologies including Covid viral pneumonia  Previous right lower lobe lung mass has diminished since July  Previous left upper lobe tumor has also decreased in size but appears more atelectatic  Increasing water attenuation pleural fluid, partially loculated pleural fluid in the left lower lung  Follow-up per cancer imaging protocol  I personally discussed this study with Erin Moseley on 1/3/2022 at 8:43 PM  Stable pancreatic cystic lesions follow-up per protocol  Small hiatal hernia with gastroesophageal reflux  Colonic diverticulosis  Mild bladder wall thickening which may be Due to incomplete distention  Workstation performed: [id number]     XR Trauma multiple (SLB/SLRA trauma bay ONLY)    Result Date: 1/3/2022  Impression: No acute cardiopulmonary disease within limitations of supine imaging  Chronic opacity left upper lung known to be tumor by prior imaging   No acute fracture or dislocation right ankle  Workstation performed: [id number]       Labs:   CBC:   Lab Results   Component Value Date    WBC 8 29 01/04/2022    HGB 10 2 (L) 01/04/2022    HCT 32 7 (L) 01/04/2022    MCV 92 01/04/2022     01/04/2022    MCH 28 7 01/04/2022    MCHC 31 2 (L) 01/04/2022    RDW 16 6 (H) 01/04/2022    MPV 11 0 01/04/2022    NRBC 0 01/04/2022     CMP:   Lab Results   Component Value Date     01/04/2022    CO2 24 01/04/2022    BUN 14 01/04/2022    CREATININE 1 53 (H) 01/04/2022    CALCIUM 8 6 01/04/2022    EGFR 33 01/04/2022

## 2022-01-04 NOTE — INCIDENTAL FINDINGS
The following findings require follow up:  Radiographic finding   Findin  Heterogeneous thyroid glands with ill-defined nodules measuring less than 1 cm  Incidental discovery of one or more thyroid nodule(s) measuring less than 1 5 cm and without suspicious features is noted in this   patient who is above 28years old; according to guidelines published in the 2015 white paper on incidental thyroid nodules in the Journal of the [employer] of Radiology [facility]), no further evaluation is recommended  2  Lung nodules  - Scattered groundglass opacities in the periphery of the right lung  Previous rounded opacity in the medial right lower lobe has diminished to a small spiculated lesion measuring approximately 2 cm, this previously measured as much has 3 7 cm and was FDG avid  Trace right-sided pleural effusion      - There are scattered groundglass opacities periphery of the left lung is well which are new from the prior exam   Previous opacity in the left upper lobe is reidentified measuring up to 5 cm, previously as much as 7 8 cm but more consolidated on today's exam which may be due to postobstructive atelectasis  Previous opacities in the superior segment of the left lower lobe appear increased on today's exam, but are difficult to differentiate from one is presumed to be associated loculated pleural fluid   and atelectasis  3   There is again a cystic lesion in the pancreatic tail measuring up to 2 9 cm AP  No abnormal enhancement  Stable 9 mm cystic structure anterior surface of the pancreatic neck/body, also stable           Follow up required: Routine   Follow up should be done within 1 month(s)    Please notify the following clinician to assist with the follow up:  Coty Lopez, [place]

## 2022-01-04 NOTE — PHYSICAL THERAPY NOTE
PHYSICAL THERAPY EVALUATION & TREATMENT NOTE    Patient Name: Vikki Martin  GRUGR'Q Date: 2022     AGE:   66 y o  Mrn:   [id number]  ADMIT DX:  Head injury [S09 90XA]    History reviewed  No pertinent past medical history  Length Of Stay: 0  PHYSICAL THERAPY EVALUATION :   Patient's identity confirmed via 2 patient identifiers (full name and ) at start of session       22 0837   PT Last Visit   PT Visit Date 22   Note Type   Note type Evaluation   Pain Assessment   Pain Assessment Tool 0-10   Pain Score No Pain   Restrictions/Precautions   Weight Bearing Precautions Per Order No   Other Precautions Cognitive; Chair Alarm; Bed Alarm;Multiple lines; Fall Risk   Home Living   Type of [facility] [address] Two level;Elevator  (0 MARY JANE)   Bathroom Shower/Tub Walk-in shower   Bathroom Toilet Standard   Bathroom Equipment Grab bars in shower; Shower chair;Grab bars around toilet   [address] Walker;Cane   Additional Comments Pt reports not using AD PTA   Prior Function   Level of [place] Independent with ADLs and functional mobility   Lives With [employer] Help From Family   ADL Assistance Independent   IADLs Independent   Falls in the last 6 months 5 to 10   Vocational Retired   Comments Pt reports living w/ her brother and her cousin  Her brother is  mod I in his WC, the home is  accessible   She does not use an AD PTA    General   Family/Caregiver Present No   Cognition   Overall Cognitive Status Impaired   Arousal/Participation Alert   Attention Attends with cues to redirect   Orientation Level Oriented X4   Memory Decreased recall of recent events   Following Commands Follows one step commands with increased time or repetition   Comments Pt pleasant and agreeable to participate in PT evaluation, she requires increased time and repetition for tasks   RLE Assessment   RLE Assessment [facility] Strength RLE   RLE Overall Strength 3+/5   LLE Assessment   LLE Assessment WFL   Strength LLE   LLE Overall Strength 3+/5   Bed Mobility   Supine to Sit 4  Minimal assistance   Additional items Assist x 1; Increased time required;Verbal cues;LE management   Transfers   Sit to Stand 4  Minimal assistance   Additional items Assist x 1; Increased time required;Verbal cues   Stand to Sit 4  Minimal assistance   Additional items Assist x 1; Increased time required;Verbal cues   Stand pivot 4  Minimal assistance   Additional items Assist x 1; Increased time required;Verbal cues  (no AD use, HHA)   Ambulation/Elevation   Gait pattern Decreased foot clearance; Short stride; Excessively slow   Gait Assistance 4  Minimal assist   Additional items Assist x 1;Verbal cues   Assistive Device Rolling walker   Distance 5 ft   Balance   Static Sitting Good   Static Standing Fair -   Ambulatory Fair -   Activity Tolerance   Activity Tolerance Patient limited by fatigue   Medical Staff Made Aware OT Makayla Zuniga, Spoke to Erica Fletcher from Trauma, [address] [facility]   Nurse Made Aware Spoke to RN Nilesh Dobbs   Assessment   Prognosis Fair   Problem List Decreased strength;Decreased endurance; Impaired balance;Decreased mobility; Decreased cognition;Decreased safety awareness; Obesity   Assessment Christie Hightower is a 66 y o  Female who [facility] on 1/3/2022 from home w/ c/o multiple falls and diagnosis of buttocks hematoma  Orders for PT eval and treat received, fall precautions  Pt presents w/ comorbidities of Lung cancer, DMII, HTN, CKD Stage 3, Afib, depresion  At baseline, pt mobilizes independently w/ no AD, and reports 5 falls in the last 6 months  Upon evaluation, pt presents w/ the following deficits: weakness, impaired balance, decreased endurance and decreased cognition  Pt requires min A x 1 for bed mobility, min A x 1 for transfers, and CGA for gait   Pt's clinical presentation is unstable/unpredictable due to need for assist w/ all phases of mobility when usually mobilizing independently, need for input for task focus and mobility technique, recent drastic decline in mobility compared to baseline and recent history of falls  Pt is at an increased risk of falls due to physical and cognitive deficits  Given the above findings, discharge recommendation is for Home w/ HHPT and family support  During this admission, pt would benefit from continued skilled inpatient PT in the acute care setting in order to address the abovementioned deficits to maximize function and mobility before DC from acute care  Goals   Patient Goals go home   STG Expiration Date 01/14/22   Short Term Goal #1 Patient will: Increase bilateral LE strength 1/2 grade to facilitate independent mobility, Perform all bed mobility tasks modified independent to decrease fall risk factors, Perform all transfers modified independent to improve independence, Ambulate at least 150 ft  +/- LRAD modified independent w/o LOB, Increase all balance 1/2 grade to decrease risk for falls, Complete exercise program independently and Tolerate 3 hr OOB to faciliate upright tolerance   PT Treatment Day 0   Plan   Treatment/Interventions Functional transfer training;LE strengthening/ROM; Therapeutic exercise; Endurance training;Cognitive reorientation;Patient/family training;Equipment eval/education; Bed mobility;Gait training   PT Frequency 3-5x/wk   Recommendation   PT Discharge Recommendation Home with home health rehabilitation   Equipment Recommended [facility] Recommended Wheeled walker   Change/add to [employer]?  No   Additional Comments Pt reports cousin will be home at all times to assist her as needed   Praful 8 in Bed Without Bedrails 3   Lying on Back to Sitting on Edge of Flat Bed 3   Moving Bed to Chair 3   Standing Up From Chair 3   Walk in Room 3   Climb 3-5 Stairs 2  (pt does not navigate stairs at baseline)   Basic Mobility Inpatient Raw Score 17   Basic Mobility Standardized Score 39 67   Highest Level Of Mobility   -[facility] Goal 5: Stand one or more mins   JH-HL Highest Level of Mobility 6: Walk 10 steps or more   JH-HLM Goal Achieved Yes   Additional Treatment Session   Start Time 0627   End Time 0842   Treatment Assessment Pt seated OOB in recliner chair, agreeable to participate in PT intervention  Pt progressed to being able to perform STS from recliner chair w/ supervision and increased time  She is then able to ambulate 50 ft around room w/ RW and CGA --> CS  Pt returned to recliner chair at end of session, no knee buckling or LOB noted during ambulation this session  Educated pt on using RW upon return home to decrease risk of falls  Equipment Use RW   Additional Treatment Day 1   End of Consult   Patient Position at End of Consult Bedside chair;Bed/Chair alarm activated; All needs within reach     Pt seen as a co-eval due to the patient's co-morbidities, clinically unstable presentation, and present impairments which are a regression from the patient's baseline  The patient's AM-PAC Basic Mobility Inpatient Short Form Raw Score is 17, Standardized Score is 39 67  A standardized score greater than 38 32 (raw score of 16) suggests the patient may benefit from discharge to home which may not coincide with above PT recommendations  However please refer to therapist recommendation for discharge planning given other factors that may influence destination      Pt would benefit from skilled inpatient PT during this admission in order to facilitate progress towards goals to maximize functional independence    Elda Joe, PT, DPT

## 2022-01-04 NOTE — ASSESSMENT & PLAN NOTE
- UA positive for UTI  - Cipro x 5 days, as this has helped patient in the past with UTI  - symptoms of urgency now at every 20 minutes, will  start with a dose of Rocephin now

## 2022-01-04 NOTE — ASSESSMENT & PLAN NOTE
Lab Results   Component Value Date    EGFR 35 01/04/2022    EGFR 33 01/04/2022    EGFR 27 01/03/2022    CREATININE 1 45 (H) 01/04/2022    CREATININE 1 53 (H) 01/04/2022    CREATININE 1 78 (H) 01/03/2022   Creatinine remains within her baseline  Avoid nephrotoxic agents

## 2022-01-04 NOTE — ASSESSMENT & PLAN NOTE
· Patient reported to have multiple falls and reports of confusion and also with headaches/fatigue recently  · Head CT unremarkable  · COVID negative  · Already received IV fluids on admission for suspected dehydration  · Check MRI of brain to assess for possibility of mets/stroke

## 2022-01-04 NOTE — DISCHARGE INSTRUCTIONS
Contusion in Adults   WHAT YOU NEED TO KNOW:   A contusion is a bruise that appears on your skin after an injury  A bruise happens when small blood vessels tear but skin does not  Blood leaks into nearby tissue, such as soft tissue or muscle  DISCHARGE INSTRUCTIONS:   Seek care immediately if:   · You have new trouble moving the injured area  · You have tingling or numbness in or near the injured area  · Your hand or foot below the bruise gets cold or turns pale  Call your doctor if:   · You find a new lump in the injured area  · Your symptoms do not improve with treatment after 4 to 5 days  · You have questions or concerns about your condition or care  Medicines: You may need any of the following:  · NSAIDs , such as ibuprofen, help decrease swelling, pain, and fever  This medicine is available with or without a doctor's order  NSAIDs can cause stomach bleeding or kidney problems in certain people  If you take blood thinner medicine, always ask your healthcare provider if NSAIDs are safe for you  Always read the medicine label and follow directions  · Prescription pain medicine  may be given  Ask your healthcare provider how to take this medicine safely  Some prescription pain medicines contain acetaminophen  Do not take other medicines that contain acetaminophen without talking to your healthcare provider  Too much acetaminophen may cause liver damage  Prescription pain medicine may cause constipation  Ask your healthcare provider how to prevent or treat constipation  · Take your medicine as directed  Contact your healthcare provider if you think your medicine is not helping or if you have side effects  Tell him or her if you are allergic to any medicine  Keep a list of the medicines, vitamins, and herbs you take  Include the amounts, and when and why you take them  Bring the list or the pill bottles to follow-up visits  Carry your medicine list with you in case of an emergency      Help a contusion heal:   · Rest the injured area  or use it less than usual  If you bruised your leg or foot, you may need crutches or a cane to help you walk  This will help you keep weight off your injured body part  · Apply ice  to decrease swelling and pain  Ice may also help prevent tissue damage  Use an ice pack, or put crushed ice in a plastic bag  Cover it with a towel and place it on your bruise for 15 to 20 minutes every hour or as directed  · Use compression  to support the area and decrease swelling  Wrap an elastic bandage around the area over the bruised muscle  Make sure the bandage is not too tight  You should be able to fit 1 finger between the bandage and your skin  · Elevate (raise) your injured body part  above the level of your heart to help decrease pain and swelling  Use pillows, blankets, or rolled towels to elevate the area as often as you can  · Do not drink alcohol  as directed  Alcohol may slow healing  · Do not stretch injured muscles  right after your injury  Ask your healthcare provider when and how you may safely stretch after your injury  Gentle stretches can help increase your flexibility  · Do not massage the area or put heating pads  on the bruise right after your injury  Heat and massage may slow healing  Your healthcare provider may tell you to apply heat after several days  At that time, heat will start to help the injury heal     Prevent another contusion:   · Stretch and warm up before you play sports or exercise  · Wear protective gear when you play sports  Examples are shin guards and padding  · If you begin a new physical activity, start slowly to give your body a chance to adjust     Follow up with your doctor as directed:  Write down your questions so you remember to ask them during your visits  © Copyright [employer] 2021 Information is for End User's use only and may not be sold, redistributed or otherwise used for commercial purposes   All illustrations and images included in CareNotes® are the copyrighted property of A D A M , Inc  or Jazmin Thornton  The above information is an  only  It is not intended as medical advice for individual conditions or treatments  Talk to your doctor, nurse or pharmacist before following any medical regimen to see if it is safe and effective for you

## 2022-01-04 NOTE — CASE MANAGEMENT
Case Management Discharge Planning Note    Patient name Ezra Evangelista  Location [address] [id number] MRN [id number]  : 1950 Date 2022       Current Admission Date: 1/3/2022  Current Admission Diagnosis:Buttocks Hematoma   Patient Active Problem List    Diagnosis Date Noted    Hypomagnesemia 2022    Multiple falls 2022    Buttocks Hematoma 2022    Contusion of left lower leg 2022    Lung cancer ([facility] ) 2022    Chronic kidney disease, stage 3 ([place] [address] ) 2022    Hypertension 2022    Diabetes type 2, controlled ([place] [address] ) 2022    Depression 2022    Hypokalemia 2022    Atrial fibrillation ([place] [address] ) 2022      LOS (days): 0  Geometric Mean LOS (GMLOS) (days):   Days to GMLOS:     OBJECTIVE:            Current admission status: Observation   Preferred Pharmacy: No Pharmacies Listed  Primary Care Provider: Sebastien Bennett DO    Primary Insurance: MEDICARE  Secondary Insurance: [facility]    DISCHARGE DETAILS:    Discharge planning discussed with[de-identified] patient  Freedom of Choice: Yes  Comments - Freedom of Choice: discussed with patient recommendation for [facility] services  Patient in agreement and in agreement with referral to [facility]  CM contacted family/caregiver?: No- see comments (patient alert and oriented   Understands recommendation)  Were Treatment Team discharge recommendations reviewed with patient/caregiver?: Yes  Did patient/caregiver verbalize understanding of patient care needs?: Yes  Were patient/caregiver advised of the risks associated with not following Treatment Team discharge recommendations?: Yes         [address]         Is the patient interested in [facility] at discharge?: Yes  [address] requested[de-identified] [address] Name[de-identified] [facility] Provider[de-identified] PCP  Home Health Services Needed[de-identified] Evaluate Functional Status and Safety,Gait/ADL Training,Strengthening/Theraputic Exercises to Improve Function  Homebound Criteria Met[de-identified] Requires the Assistance of Another Person for Safe Ambulation or to Leave the Home,Uses an Assist Device (i e  cane, walker, etc)  Supporting Clincal Findings[de-identified] Limited Endurance,Fatigues Easliy in Short Distances    DME Referral Provided  Referral made for DME?: No    Other Referral/Resources/Interventions Provided:  Interventions: Short Term Rehab,[facility]  Referral Comments: Ivan in agreement with referral to [facility] for recommended [facility] services,  placed referral          Treatment Team Recommendation: Home with [address] [facility]  Discharge Destination Plan[de-identified[facility]

## 2022-01-04 NOTE — ASSESSMENT & PLAN NOTE
Lab Results   Component Value Date    EGFR 27 01/03/2022    CREATININE 1 78 (H) 01/03/2022     · Kidney function has varied over the past several months, likely due to nutrition and chemotherapy associated nausea/vomiting  · Per most recent family medicine note from 10/2021, baseline creatinine is typically between 1 6-1 8  · Continue to monitor kidney function  · IVF  · Hold diuretics and ARB  Continue to monitor I&O and BP--reinitiate as appropriate

## 2022-01-04 NOTE — ASSESSMENT & PLAN NOTE
· Abnormal CT with ground-glass opacities in bilateral lower lobes  Patient tested negative for COVID and reports no respiratory complaints    Would observe

## 2022-01-04 NOTE — ASSESSMENT & PLAN NOTE
· Abnormal urinalysis noted, but urine culture pending and patient with no abnormal urinary symptoms or sepsis criteria  · Placed on Cipro by primary service but would recommend discontinuing this especially due to side effects of encephalopathy with quinolones; await final urine culture, at this time I am not convinced she has a true urinary tract infection and would recommend observing off antibiotics    Tailor antibiotics to culture if abnormal

## 2022-01-04 NOTE — ASSESSMENT & PLAN NOTE
· Continue home BuSpar and Effexor  · Continue to follow with PCP and palliative care as an outpatient

## 2022-01-04 NOTE — ASSESSMENT & PLAN NOTE
· CT imaging revealed Suspected small post traumatic hematoma measuring 2 cm in the soft tissues of the right buttock/perianal region  No active bleeding  No visceral or osseous injury identified  · May continue Eliquis due to no active extravasation    Repeat hemoglobin in AM  · Ice  · Tylenol as needed  · PT/OT

## 2022-01-04 NOTE — ASSESSMENT & PLAN NOTE
· CT imaging revealed Suspected small post traumatic hematoma measuring 2 cm in the soft tissues of the right buttock/perianal region  No active bleeding  No visceral or osseous injury identified  · Continued Eliquis    · Hemoglobin 10 2 this morning, down from 11 1-no sign of hemodynamic instability or active bleeding  · Ice  · Tylenol as needed  · PT/OT

## 2022-01-04 NOTE — ASSESSMENT & PLAN NOTE
· Continue home amlodipine, metoprolol, sotalol  · Per daughter, patient was recently started on amlodipine  Will closely monitor BP  Patient is hypertensive on admission, SBP >220  Improved with PM antihypertensives  Continue to monitor BP  · Hold home Lasix and losartan due to kidney functioning  · Hydralazine as needed  · Follow-up with family medicine provider as an OP

## 2022-01-04 NOTE — ASSESSMENT & PLAN NOTE
· Continue home amlodipine, metoprolol, sotalol  · Per daughter, patient was recently started on amlodipine  Will closely monitor BP  Patient is hypertensive on admission  Antihypertensive medications scheduled for this evening  Continue to monitor BP  · Hold home Lasix and losartan due to kidney functioning  · Follow-up with family medicine provider as an OP

## 2022-01-04 NOTE — PLAN OF CARE
Problem: MOBILITY - ADULT  Goal: Maintain or return to baseline ADL function  Description: INTERVENTIONS:  -  Assess patient's ability to carry out ADLs; assess patient's baseline for ADL function and identify physical deficits which impact ability to perform ADLs (bathing, care of mouth/teeth, toileting, grooming, dressing, etc )  - Assess/evaluate cause of self-care deficits   - Assess range of motion  - Assess patient's mobility; develop plan if impaired  - Assess patient's need for assistive devices and provide as appropriate  - Encourage maximum independence but intervene and supervise when necessary  - Involve family in performance of ADLs  - Assess for home care needs following discharge   - Consider OT consult to assist with ADL evaluation and planning for discharge  - Provide patient education as appropriate  Outcome: Progressing  Goal: Maintains/Returns to pre admission functional level  Description: INTERVENTIONS:  - Perform BMAT or MOVE assessment daily    - Set and communicate daily mobility goal to care team and patient/family/caregiver     - Collaborate with rehabilitation services on mobility goals if consulted  - Ambulate patient 2 times a day  - Out of bed for meals 3 times a day  - Out of bed for toileting  - Record patient progress and toleration of activity level   Outcome: Progressing     Problem: Prexisting or High Potential for Compromised Skin Integrity  Goal: Skin integrity is maintained or improved  Description: INTERVENTIONS:  - Identify patients at risk for skin breakdown  - Assess and monitor skin integrity  - Assess and monitor nutrition and hydration status  - Monitor labs   - Assess for incontinence   - Turn and reposition patient  - Assist with mobility/ambulation  - Relieve pressure over bony prominences  - Avoid friction and shearing  - Provide appropriate hygiene as needed including keeping skin clean and dry  - Evaluate need for skin moisturizer/barrier cream  - Collaborate with interdisciplinary team   - Patient/family teaching  - Consider wound care consult   Outcome: Progressing     Problem: Potential for Falls  Goal: Patient will remain free of falls  Description: INTERVENTIONS:  - Educate patient/family on patient safety including physical limitations  - Instruct patient to call for assistance with activity   - Consult OT/PT to assist with strengthening/mobility   - Keep Call bell within reach  - Keep bed low and locked with side rails adjusted as appropriate  - Keep care items and personal belongings within reach  - Initiate and maintain comfort rounds  - Make Fall Risk Sign visible to staff  - Initiate/Maintain bed/chair alarm  - Obtain necessary fall risk management equipment  - Apply yellow socks and bracelet for high fall risk patients  - Consider moving patient to room near nurses station  Outcome: Progressing

## 2022-01-04 NOTE — ASSESSMENT & PLAN NOTE
No results found for: HGBA1C    Recent Labs     01/04/22  0734 01/04/22  1150   POCGLU 97 142*       Blood Sugar Average: Last 72 hrs:  (P) 119 5 due for updated A1c  Monitor on Accu-Cheks  Diabetic diet

## 2022-01-04 NOTE — ASSESSMENT & PLAN NOTE
No results found for: HGBA1C    No results for input(s): POCGLU in the last 72 hours      Blood Sugar Average: Last 72 hrs:    · Last A1c 10/2021 was 6 8  · Will hold Tradjenta and metformin  · Initiate subcut insulin

## 2022-01-04 NOTE — ED PROVIDER NOTES
Emergency Department Airway Evaluation and Management Form    History  Obtained from: patient and EMS  Patient has no allergy information on record  No chief complaint on file  HPI    No past medical history on file  No past surgical history on file  No family history on file  Social History     Tobacco Use    Smoking status: Not on file    Smokeless tobacco: Not on file   Substance Use Topics    Alcohol use: Not on file    Drug use: Not on file     I have reviewed and agree with the history as documented  Review of Systems    Physical Exam  There were no vitals taken for this visit  Physical Exam    ED Medications  Medications - No data to display    Intubation  Procedures    Notes  65 yo F w/ multiple falls today, on eliquis  Airway is intact with no indication for airway intervention at this time  Care relinquished to the trauma team for management and disposition          Final Diagnosis  Final diagnoses:   None       ED Provider  Electronically Signed by     Bisi Flores DO  01/03/22 5455

## 2022-01-04 NOTE — ASSESSMENT & PLAN NOTE
· Patient apparently has previous history of C diff and has had fairly persistent diarrhea since  Unclear if this is related to chemotherapy or if perhaps patient has not cleared her C diff infection; noted that he she actually had C diff positive PCR but not EIA in October  · Recommend rechecking for C diff now to see if she is still truly positive and requires additional treatment  Would not provide any p r n   Imodium until this result comes back

## 2022-01-05 ENCOUNTER — APPOINTMENT (INPATIENT)
Dept: MRI IMAGING | Facility: HOSPITAL | Age: 72
DRG: 064 | End: 2022-01-05
Payer: MEDICARE

## 2022-01-05 ENCOUNTER — APPOINTMENT (INPATIENT)
Dept: NON INVASIVE DIAGNOSTICS | Facility: HOSPITAL | Age: 72
DRG: 064 | End: 2022-01-05
Payer: MEDICARE

## 2022-01-05 PROBLEM — N30.90 CYSTITIS: Status: ACTIVE | Noted: 2022-01-04

## 2022-01-05 PROBLEM — I63.9 ACUTE ISCHEMIC STROKE (HCC): Status: ACTIVE | Noted: 2022-01-05

## 2022-01-05 LAB
AMMONIA PLAS-SCNC: 13 UMOL/L (ref 11–35)
ANION GAP SERPL CALCULATED.3IONS-SCNC: 12 MMOL/L (ref 4–13)
AORTIC ROOT: 3.3 CM
APICAL FOUR CHAMBER EJECTION FRACTION: 59 %
ASCENDING AORTA: 3.2 CM
AV LVOT PEAK GRADIENT: 2 MMHG
AV PEAK GRADIENT: 5 MMHG
BASE EXCESS BLDA CALC-SCNC: -2 MMOL/L (ref -2–3)
BASOPHILS # BLD AUTO: 0.1 THOUSANDS/ΜL (ref 0–0.1)
BASOPHILS NFR BLD AUTO: 1 % (ref 0–1)
BUN SERPL-MCNC: 12 MG/DL (ref 5–25)
CALCIUM SERPL-MCNC: 9.7 MG/DL (ref 8.3–10.1)
CHLORIDE SERPL-SCNC: 105 MMOL/L (ref 100–108)
CHOLEST SERPL-MCNC: 210 MG/DL
CO2 SERPL-SCNC: 22 MMOL/L (ref 21–32)
CREAT SERPL-MCNC: 1.3 MG/DL (ref 0.6–1.3)
DOP CALC RVOT PEAK VEL: 0.72 M/S
E WAVE DECELERATION TIME: 208 MS
EOSINOPHIL # BLD AUTO: 0.02 THOUSAND/ΜL (ref 0–0.61)
EOSINOPHIL NFR BLD AUTO: 0 % (ref 0–6)
ERYTHROCYTE [DISTWIDTH] IN BLOOD BY AUTOMATED COUNT: 16.9 % (ref 11.6–15.1)
EST. AVERAGE GLUCOSE BLD GHB EST-MCNC: 120 MG/DL
FRACTIONAL SHORTENING: 33 % (ref 28–44)
GFR SERPL CREATININE-BSD FRML MDRD: 40 ML/MIN/1.73SQ M
GLUCOSE SERPL-MCNC: 145 MG/DL (ref 65–140)
GLUCOSE SERPL-MCNC: 152 MG/DL (ref 65–140)
GLUCOSE SERPL-MCNC: 157 MG/DL (ref 65–140)
GLUCOSE SERPL-MCNC: 162 MG/DL (ref 65–140)
GLUCOSE SERPL-MCNC: 219 MG/DL (ref 65–140)
GLUCOSE SERPL-MCNC: 98 MG/DL (ref 65–140)
HBA1C MFR BLD: 5.8 %
HCO3 BLDA-SCNC: 22.7 MMOL/L (ref 24–30)
HCT VFR BLD AUTO: 40.5 % (ref 34.8–46.1)
HCT VFR BLD CALC: 33 % (ref 34.8–46.1)
HDLC SERPL-MCNC: 41 MG/DL
HGB BLD-MCNC: 12.4 G/DL (ref 11.5–15.4)
HGB BLDA-MCNC: 11.2 G/DL (ref 11.5–15.4)
IMM GRANULOCYTES # BLD AUTO: 0.11 THOUSAND/UL (ref 0–0.2)
IMM GRANULOCYTES NFR BLD AUTO: 1 % (ref 0–2)
INTERVENTRICULAR SEPTUM IN DIASTOLE (PARASTERNAL SHORT AXIS VIEW): 1 CM
LDLC SERPL CALC-MCNC: 119 MG/DL (ref 0–100)
LEFT ATRIUM AREA SYSTOLE SINGLE PLANE A4C: 19.1 CM2
LEFT INTERNAL DIMENSION IN SYSTOLE: 2.9 CM (ref 2.1–4)
LEFT VENTRICULAR INTERNAL DIMENSION IN DIASTOLE: 4.3 CM (ref 4.52–6.73)
LEFT VENTRICULAR POSTERIOR WALL IN END DIASTOLE: 1 CM
LEFT VENTRICULAR STROKE VOLUME: 49 ML
LYMPHOCYTES # BLD AUTO: 0.85 THOUSANDS/ΜL (ref 0.6–4.47)
LYMPHOCYTES NFR BLD AUTO: 5 % (ref 14–44)
MCH RBC QN AUTO: 27.9 PG (ref 26.8–34.3)
MCHC RBC AUTO-ENTMCNC: 30.6 G/DL (ref 31.4–37.4)
MCV RBC AUTO: 91 FL (ref 82–98)
MONOCYTES # BLD AUTO: 0.83 THOUSAND/ΜL (ref 0.17–1.22)
MONOCYTES NFR BLD AUTO: 5 % (ref 4–12)
MV E'TISSUE VEL-SEP: 7 CM/S
MV PEAK A VEL: 0.56 M/S
MV PEAK E VEL: 67 CM/S
MV STENOSIS PRESSURE HALF TIME: 0 MS
NEUTROPHILS # BLD AUTO: 13.86 THOUSANDS/ΜL (ref 1.85–7.62)
NEUTS SEG NFR BLD AUTO: 88 % (ref 43–75)
NONHDLC SERPL-MCNC: 169 MG/DL
NRBC BLD AUTO-RTO: 0 /100 WBCS
PCO2 BLD: 24 MMOL/L (ref 21–32)
PCO2 BLD: 36.4 MM HG (ref 42–50)
PH BLD: 7.4 [PH] (ref 7.3–7.4)
PLATELET # BLD AUTO: 239 THOUSANDS/UL (ref 149–390)
PMV BLD AUTO: 11.6 FL (ref 8.9–12.7)
PO2 BLD: 39 MM HG (ref 35–45)
POTASSIUM BLD-SCNC: 3.3 MMOL/L (ref 3.5–5.3)
POTASSIUM SERPL-SCNC: 4.1 MMOL/L (ref 3.5–5.3)
PV PEAK GRADIENT: 2 MMHG
RBC # BLD AUTO: 4.44 MILLION/UL (ref 3.81–5.12)
RIGHT ATRIUM AREA SYSTOLE A4C: 16.7 CM2
RIGHT VENTRICLE ID DIMENSION: 3.6 CM
SAO2 % BLD FROM PO2: 75 % (ref 60–85)
SL CV LV EF: 60
SL CV PED ECHO LEFT VENTRICLE DIASTOLIC VOLUME (MOD BIPLANE) 2D: 81 ML
SL CV PED ECHO LEFT VENTRICLE SYSTOLIC VOLUME (MOD BIPLANE) 2D: 32 ML
SL CV RVOT MAX GRADIENT: 2 MMHG
SODIUM BLD-SCNC: 143 MMOL/L (ref 136–145)
SODIUM SERPL-SCNC: 139 MMOL/L (ref 136–145)
SPECIMEN SOURCE: ABNORMAL
TRICUSPID VALVE PEAK REGURGITATION VELOCITY: 2.56 M/S
TRICUSPID VALVE S': 0.7 CM/S
TRIGL SERPL-MCNC: 252 MG/DL
TV PEAK GRADIENT: 26 MMHG
WBC # BLD AUTO: 15.77 THOUSAND/UL (ref 4.31–10.16)
Z-SCORE OF LEFT VENTRICULAR DIMENSION IN END SYSTOLE: -2.48

## 2022-01-05 PROCEDURE — 70552 MRI BRAIN STEM W/DYE: CPT

## 2022-01-05 PROCEDURE — 85025 COMPLETE CBC W/AUTO DIFF WBC: CPT | Performed by: PHYSICIAN ASSISTANT

## 2022-01-05 PROCEDURE — 99223 1ST HOSP IP/OBS HIGH 75: CPT | Performed by: PSYCHIATRY & NEUROLOGY

## 2022-01-05 PROCEDURE — 70549 MR ANGIOGRAPH NECK W/O&W/DYE: CPT

## 2022-01-05 PROCEDURE — 80048 BASIC METABOLIC PNL TOTAL CA: CPT | Performed by: PHYSICIAN ASSISTANT

## 2022-01-05 PROCEDURE — 70544 MR ANGIOGRAPHY HEAD W/O DYE: CPT

## 2022-01-05 PROCEDURE — A9585 GADOBUTROL INJECTION: HCPCS | Performed by: NURSE PRACTITIONER

## 2022-01-05 PROCEDURE — 93306 TTE W/DOPPLER COMPLETE: CPT | Performed by: INTERNAL MEDICINE

## 2022-01-05 PROCEDURE — 93356 MYOCRD STRAIN IMG SPCKL TRCK: CPT

## 2022-01-05 PROCEDURE — 82948 REAGENT STRIP/BLOOD GLUCOSE: CPT

## 2022-01-05 PROCEDURE — 99232 SBSQ HOSP IP/OBS MODERATE 35: CPT | Performed by: PHYSICIAN ASSISTANT

## 2022-01-05 PROCEDURE — 93306 TTE W/DOPPLER COMPLETE: CPT

## 2022-01-05 PROCEDURE — 99232 SBSQ HOSP IP/OBS MODERATE 35: CPT | Performed by: SURGERY

## 2022-01-05 PROCEDURE — G1004 CDSM NDSC: HCPCS

## 2022-01-05 PROCEDURE — 83036 HEMOGLOBIN GLYCOSYLATED A1C: CPT | Performed by: PHYSICIAN ASSISTANT

## 2022-01-05 PROCEDURE — 93356 MYOCRD STRAIN IMG SPCKL TRCK: CPT | Performed by: INTERNAL MEDICINE

## 2022-01-05 PROCEDURE — 82140 ASSAY OF AMMONIA: CPT | Performed by: PHYSICIAN ASSISTANT

## 2022-01-05 PROCEDURE — 80061 LIPID PANEL: CPT | Performed by: PHYSICIAN ASSISTANT

## 2022-01-05 RX ORDER — ATORVASTATIN CALCIUM 40 MG/1
40 TABLET, FILM COATED ORAL
Status: DISCONTINUED | OUTPATIENT
Start: 2022-01-05 | End: 2022-01-05

## 2022-01-05 RX ORDER — ASPIRIN 81 MG/1
81 TABLET ORAL DAILY
Status: DISCONTINUED | OUTPATIENT
Start: 2022-01-06 | End: 2022-01-05

## 2022-01-05 RX ORDER — FUROSEMIDE 20 MG/1
20 TABLET ORAL DAILY
Status: DISCONTINUED | OUTPATIENT
Start: 2022-01-05 | End: 2022-01-06

## 2022-01-05 RX ORDER — ASPIRIN 325 MG
325 TABLET ORAL ONCE
Status: COMPLETED | OUTPATIENT
Start: 2022-01-05 | End: 2022-01-05

## 2022-01-05 RX ORDER — LOSARTAN POTASSIUM 50 MG/1
50 TABLET ORAL DAILY
Status: DISCONTINUED | OUTPATIENT
Start: 2022-01-06 | End: 2022-01-06

## 2022-01-05 RX ORDER — ATORVASTATIN CALCIUM 40 MG/1
80 TABLET, FILM COATED ORAL
Status: DISCONTINUED | OUTPATIENT
Start: 2022-01-05 | End: 2022-01-11 | Stop reason: HOSPADM

## 2022-01-05 RX ADMIN — Medication 125 MG: at 13:52

## 2022-01-05 RX ADMIN — SOTALOL HYDROCHLORIDE 80 MG: 80 TABLET ORAL at 18:27

## 2022-01-05 RX ADMIN — APIXABAN 5 MG: 5 TABLET, FILM COATED ORAL at 08:50

## 2022-01-05 RX ADMIN — METOPROLOL TARTRATE 25 MG: 25 TABLET, FILM COATED ORAL at 20:26

## 2022-01-05 RX ADMIN — FOLIC ACID 1 MG: 1 TABLET ORAL at 08:50

## 2022-01-05 RX ADMIN — LORATADINE 10 MG: 10 TABLET ORAL at 08:50

## 2022-01-05 RX ADMIN — BUSPIRONE HYDROCHLORIDE 7.5 MG: 5 TABLET ORAL at 08:50

## 2022-01-05 RX ADMIN — ASPIRIN 325 MG ORAL TABLET 325 MG: 325 PILL ORAL at 07:02

## 2022-01-05 RX ADMIN — Medication 125 MG: at 20:24

## 2022-01-05 RX ADMIN — INSULIN LISPRO 1 UNITS: 100 INJECTION, SOLUTION INTRAVENOUS; SUBCUTANEOUS at 08:37

## 2022-01-05 RX ADMIN — Medication 2000 UNITS: at 08:50

## 2022-01-05 RX ADMIN — VENLAFAXINE HYDROCHLORIDE 37.5 MG: 37.5 CAPSULE, EXTENDED RELEASE ORAL at 21:51

## 2022-01-05 RX ADMIN — ATORVASTATIN CALCIUM 80 MG: 40 TABLET, FILM COATED ORAL at 18:27

## 2022-01-05 RX ADMIN — HYDRALAZINE HYDROCHLORIDE 10 MG: 20 INJECTION, SOLUTION INTRAMUSCULAR; INTRAVENOUS at 13:15

## 2022-01-05 RX ADMIN — METOPROLOL TARTRATE 25 MG: 25 TABLET, FILM COATED ORAL at 08:49

## 2022-01-05 RX ADMIN — VITAM B12 100 MCG: 100 TAB at 08:49

## 2022-01-05 RX ADMIN — FUROSEMIDE 20 MG: 20 TABLET ORAL at 13:52

## 2022-01-05 RX ADMIN — GADOBUTROL 8 ML: 604.72 INJECTION INTRAVENOUS at 17:54

## 2022-01-05 RX ADMIN — CEFTRIAXONE SODIUM 1000 MG: 10 INJECTION, POWDER, FOR SOLUTION INTRAVENOUS at 06:59

## 2022-01-05 RX ADMIN — BUSPIRONE HYDROCHLORIDE 7.5 MG: 5 TABLET ORAL at 20:24

## 2022-01-05 RX ADMIN — INSULIN LISPRO 1 UNITS: 100 INJECTION, SOLUTION INTRAVENOUS; SUBCUTANEOUS at 16:56

## 2022-01-05 RX ADMIN — SOTALOL HYDROCHLORIDE 80 MG: 80 TABLET ORAL at 08:49

## 2022-01-05 RX ADMIN — APIXABAN 5 MG: 5 TABLET, FILM COATED ORAL at 18:27

## 2022-01-05 RX ADMIN — PANTOPRAZOLE SODIUM 40 MG: 40 TABLET, DELAYED RELEASE ORAL at 07:02

## 2022-01-05 NOTE — ASSESSMENT & PLAN NOTE
· Patient's initial reason for admission was multiple falls also with reports by her daughter of intermittent confusion  · CTH negative  MRI without gadolinium showed Right basal ganglia and right caudate acute ischemia  · Continue Lipitor  · Neuro consult appreciated-- case discussed with them  Plan to repeat MRI with gadolinium and do MRA to see whether this is indeed ischemic changes verses metastasis  · Echo unremarkable  · Patient with known h/o Afib no need for Tele   Continue Eliquis AC; per Neurology no need for aspirin at this time  · PT/OT evals noted but would recommend re-evaluation  · allow for permissive HTN

## 2022-01-05 NOTE — ASSESSMENT & PLAN NOTE
Lab Results   Component Value Date    EGFR 35 01/04/2022    EGFR 33 01/04/2022    EGFR 27 01/03/2022    CREATININE 1 45 (H) 01/04/2022    CREATININE 1 53 (H) 01/04/2022    CREATININE 1 78 (H) 01/03/2022     · Kidney function has varied over the past several months, likely due to nutrition and chemotherapy associated nausea/vomiting  · Per most recent family medicine note from 10/2021, baseline creatinine is typically between 1 6-1 8  · Continue to monitor kidney function  · Improved this morning- 1 5  Continue IVF  · Hold diuretics and ARB  Continue to monitor I&O and BP--reinitiate as appropriate

## 2022-01-05 NOTE — ASSESSMENT & PLAN NOTE
· Multiple falls over the past 24 hours  · Patient denies syncope or syncope type symptoms  Associates falls with left knee osteoarthritis and for replacement  Denies any new injury to knee  · Injuries listed below  · PT/OT  · GCS remaining a 13 - 14    Following commands however confused about who she is living with and date and time

## 2022-01-05 NOTE — ASSESSMENT & PLAN NOTE
No results found for: HGBA1C    Recent Labs     01/04/22  0734 01/04/22  1150 01/04/22  1620   POCGLU 97 142* 145*       Blood Sugar Average: Last 72 hrs:  (P) 128 due for updated A1c  Monitor on Accu-Cheks  Diabetic diet

## 2022-01-05 NOTE — CONSULTS
Consultation - Neurology   [facility] 66 y o  female MRN: [id number]  Unit/Bed#: S -01 Encounter: [id number]     Please note patient with un-merged chart, MRN [id number]    Assessment/Plan   70-year-old female with history of atrial fibrillation on anticoagulation with Eliquis, advanced stage lung cancer on chemotherapy, history of uterine cancer, CKD, presenting initially on 01/03 as level B trauma after multiple falls at home  Initial imaging revealed posttraumatic right buttocks hematoma, otherwise no active bleeding or traumatic injury  Medicine asked to evaluate given concern for altered mental status/frequent headache; given this, she had MRI brain performed yesterday revealing concern for right BG/caudate infarct/ischemia; concern for either infarct/ischemia given her A-fib/hypercoagulable condition of malignancy  Hoping to obtain contrast MRI to definitively rule out cerebral metastatic disease  Discussed exam with primary team and nursing, there has been waxing and waning degree of lethargy/attention yesterday and throughout this morning: This may be in part due to toxic metabolic disturbances such as UTI concern, patient also slept a minimally overnight; (this all may be superimposed on baseline cognitive dysfunction)  Will certainly monitor exam for any other concerning neuro athology such as non-convulsive/subclinical seizure activity  ADDENDUM:  Discussed neurologic results and plan with patient's daughter Radha Rehman at bedside this afternoon:  Radha Rehman confirms patient has no baseline/underlying cognitive dysfunction prior to admission, usually sharp with orientation, is independent with ADLs and caring for her brother  No history of stroke    Radha Rehman as well as the patient endorse that her chemotherapy regimen was recently adjusted, and as a result patient is struggling with fatigue/malaise at home, staying relatively sedentary and limited from ambulatory standpoint (not moving around much)      * Acute ischemic stroke [facility])  Assessment & Plan  - noted on MRI brain yesterday:  -initiate stroke workup:  -MRI brain as mentioned above  -will discuss with nephrology and primary team given CKD/suboptimal GFR; would ideally obtain MRI brain with gadolinium to ensure no intracranial/cerebral metastatic disease is present   -confirmed with nephrology this AM, ok for gadolinium as GFR>30  -vessel imaging (will discuss modality with attending given CKD, will plan for MRA head/neck)  -2D echo pending  - on full dose Eliquis at home, continuing  - loaded with baby aspirin overnight, no need to continue for now (will reassess once vessels are imaged with MRA)  - continue statin  - hemoglobin A1c and lipid panel are pending  - frequent neuro checks  -Telemetry monitoring   -stroke education to be provided   -therapy evaluations ( PT/OT/speech therapy)  -continue Tylenol for headaches as patient notes efficacy  -UTI concern, continuing IV Rocephin, urine culture pending  -avoidance of CNS altering/delirium producing medications or factors during admission   -nursing notes patient slept minimally overnight last night   -geriatrics following as well    Atrial fibrillation ([facility] [address] )  Assessment & Plan  - continuing Eliquis  - telemetry monitoring    Multiple falls  Assessment & Plan  - initially evaluated by Trauma Service  - no acute traumatic injury/ fracture noted on CT imaging on admission  - supportive care  - therapy evaluations    Chronic kidney disease, stage 3 [facility])  Assessment & Plan  Lab Results   Component Value Date    EGFR 35 01/04/2022    EGFR 33 01/04/2022    EGFR 27 01/03/2022    CREATININE 1 45 (H) 01/04/2022    CREATININE 1 53 (H) 01/04/2022    CREATININE 1 78 (H) 01/03/2022     -  Trending creatinine/BUN / GFR, avoid nephrotoxic agents    Lung cancer ([facility] [address] )  Assessment & Plan  - follows with [facility] hematology/oncology  - on chemotherapy regimen    Diarrhea  Assessment & Plan  - not acute issue per notes  - history of C diff, rechecking    Will further discuss plan of care with attending neurologist      Recommendations for outpatient neurological follow up have yet to be determined  History of Present Illness     Reason for Consult / Principal Problem: Stroke  Hx and PE limited by: patient poor historian  HPI: Kimber Church is a 66 y o  female who presents with history as mentioned above in assessment who neurology is asked to evaluate in regards to abnormal MRI with concern for stroke versus metastatic disease  Patient is limited historian, history obtained primarily via chart review (will attempt to contact daughter later today for collateral information):    Patient ultimately presented as trauma evaluation yesterday on 01/03 following multiple recent falls at home  Her initial workup in the ED noted negative CT head for any acute findings, did have gluteal hematoma, she was initially admitted to trauma service for management  Family and patient did note over the past week or 2 some frequent headache, as well as concern on family's part from mental status changes and nonsensical conversation/behavior  Internal Medicine evaluated patient, workup for feel concern for UTI based on urinalysis; patient since has been started on IV Rocephin  Geriatrics evaluated the patient yesterday as well  Given the mental status changes and headache an MRI was ordered overnight in revealed concern for right basal ganglia/caudate area of restricted diffusion, concerning for ischemia  Given this Neurology was consulted for evaluation  Discussed with primary team: yesterday patient was ambulating to bathroom with walker, oriented, answering questions  Lives with brother (who has muscular dystrophy), patient performs ADL's including caring for her brother      Per night shift nurse, patient did not sleep much overnight (underwent CT scan, MRI brain, neuro checks), waxing/waning cognition and orientation/wakefulness overnight  No clinical seizure activity observed  On 2nd attempt at exam this morning patient is awake, sitting in her chair:  She states she has had multiple falls the past few days due to left knee pain/left leg giving out on her  Recalls the falls without loss of consciousness or gross head trauma, states her cousin has to help her up off the floor after the falls  She lives with her brother who has muscular dystrophy; patient cares for him, she performs her ADLs on her own  Over the past few weeks she has noted a frequent nonspecific aching headache (without nausea/vomiting, photophobia, phonophobia), takes Tylenol with improvement in headache  She denies any other recent changes such as vision or speech abnormalities, focal weakness or sensory changes to one extremity  As mentioned above will attempt to contact daughter today for additional information into patient's cognitive baseline  Inpatient consult to Neurology  Consult performed by: Anum Barber PA-C  Consult ordered by: Martine Gandara PA-C          Review of Systems   Constitutional: Negative  HENT: Negative for hearing loss and trouble swallowing  Eyes: Negative for photophobia and visual disturbance  Respiratory: Negative  Cardiovascular: Negative  Gastrointestinal: Positive for vomiting  Negative for nausea  Musculoskeletal: Positive for arthralgias (L knee) and gait problem  Falls   Skin: Negative  Neurological: Positive for weakness and headaches  Negative for dizziness, tremors, seizures, syncope, facial asymmetry, speech difficulty, light-headedness and numbness  Historical Information   History reviewed  No pertinent past medical history  History reviewed  No pertinent surgical history    Social History   Social History     Substance and Sexual Activity   Alcohol Use None     Social History     Substance and Sexual Activity   Drug Use Not on file     E-Cigarette/Vaping E-Cigarette/Vaping Substances     Social History     Tobacco Use   Smoking Status Not on file   Smokeless Tobacco Not on file     Family History: History reviewed  No pertinent family history  Review of previous medical records was completed  Meds/Allergies   current meds:   Current Facility-Administered Medications   Medication Dose Route Frequency    acetaminophen (TYLENOL) tablet 650 mg  650 mg Oral Q6H PRN    apixaban (ELIQUIS) tablet 5 mg  5 mg Oral BID    [START ON 1/6/2022] aspirin (ECOTRIN LOW STRENGTH) EC tablet 81 mg  81 mg Oral Daily    atorvastatin (LIPITOR) tablet 40 mg  40 mg Oral Daily With Dinner    busPIRone (BUSPAR) tablet 7 5 mg  7 5 mg Oral BID    cefTRIAXone (ROCEPHIN) 1,000 mg in dextrose 5 % 50 mL IVPB  1,000 mg Intravenous Q24H    cholecalciferol (VITAMIN D3) tablet 2,000 Units  2,000 Units Oral Daily    cyanocobalamin (VITAMIN B-12) tablet 100 mcg  100 mcg Oral Daily    folic acid (FOLVITE) tablet 1 mg  1 mg Oral Daily    hydrALAZINE (APRESOLINE) injection 10 mg  10 mg Intravenous Q6H PRN    insulin lispro (HumaLOG) 100 units/mL subcutaneous injection 1-6 Units  1-6 Units Subcutaneous TID AC    loratadine (CLARITIN) tablet 10 mg  10 mg Oral Daily    [START ON 1/6/2022] losartan (COZAAR) tablet 50 mg  50 mg Oral Daily    metoprolol tartrate (LOPRESSOR) tablet 25 mg  25 mg Oral Q12H STEPHIE    pantoprazole (PROTONIX) EC tablet 40 mg  40 mg Oral Early Morning    prochlorperazine (COMPAZINE) tablet 10 mg  10 mg Oral Q12H PRN    sotalol (BETAPACE) tablet 80 mg  80 mg Oral BID    venlafaxine (EFFEXOR-XR) 24 hr capsule 37 5 mg  37 5 mg Oral HS    and PTA meds:   Prior to Admission Medications   Prescriptions Last Dose Informant Patient Reported? Taking?    amLODIPine (NORVASC) 5 mg tablet 1/2/2022  Yes Yes   Sig: Take 5 mg by mouth daily   apixaban (Eliquis) 5 mg 1/3/2022  Yes Yes   Sig: Take 5 mg by mouth 2 (two) times a day   busPIRone (BUSPAR) 7 5 mg tablet 1/3/2022  Yes Yes   Sig: Take 7 5 mg by mouth 2 (two) times a day   cholecalciferol (VITAMIN D3) 1,000 units tablet 1/3/2022  Yes Yes   Sig: Take 2,000 Units by mouth daily   cyanocobalamin (VITAMIN B-12) 100 MCG tablet 1/3/2022  Yes Yes   Sig: Take 100 mcg by mouth daily   folic acid (FOLVITE) 1 mg tablet 1/3/2022  Yes Yes   Sig: Take 1 mg by mouth daily   furosemide (LASIX) 40 mg tablet Unknown  Yes No   Sig: Take 40 mg by mouth 2 (two) times a day   linaGLIPtin (Tradjenta) 5 MG TABS 1/3/2022  Yes Yes   Sig: Take 5 mg by mouth 2 (two) times a day   loratadine (CLARITIN) 10 mg tablet 1/3/2022  Yes Yes   Sig: Take 10 mg by mouth daily   losartan (COZAAR) 50 mg tablet 1/3/2022  Yes Yes   Sig: Take 50 mg by mouth daily   metFORMIN (GLUCOPHAGE) 1000 MG tablet 1/3/2022  Yes Yes   Sig: Take 1,000 mg by mouth 2 (two) times a day with meals   metoprolol tartrate (LOPRESSOR) 25 mg tablet 1/3/2022  Yes Yes   Sig: Take 25 mg by mouth every 12 (twelve) hours   omeprazole (PriLOSEC) 20 mg delayed release capsule 1/3/2022  Yes Yes   Sig: Take 20 mg by mouth daily   prochlorperazine (COMPAZINE) 10 mg tablet Past Week  Yes Yes   Sig: Take 10 mg by mouth every 12 (twelve) hours as needed for nausea or vomiting   sotalol (BETAPACE) 80 mg tablet 1/3/2022  Yes Yes   Sig: Take 80 mg by mouth 2 (two) times a day   venlafaxine (EFFEXOR-XR) 37 5 mg 24 hr capsule 1/2/2022  Yes Yes   Sig: Take 37 5 mg by mouth daily at bedtime      Facility-Administered Medications: None       Not on File    Objective   Vitals:Blood pressure 161/77, pulse 56, temperature 97 7 °F (36 5 °C), temperature source Oral, resp  rate 18, height 5' 4" (1 626 m), weight 81 kg (178 lb 9 2 oz), SpO2 95 %  ,Body mass index is 30 65 kg/m²  Intake/Output Summary (Last 24 hours) at 1/5/2022 0722  Last data filed at 1/5/2022 0401  Gross per 24 hour   Intake --   Output 900 ml   Net -900 ml       Invasive Devices:    Invasive Devices  Report    Peripheral Intravenous Line Peripheral IV 01/04/22 Left;Proximal;Ventral (anterior) Forearm <1 day    Peripheral IV 01/05/22 Distal;Right;Upper;Ventral (anterior) Arm <1 day                Physical Exam  HENT:      Head: Normocephalic and atraumatic  Eyes:      Extraocular Movements: EOM normal       Pupils: Pupils are equal, round, and reactive to light  Cardiovascular:      Rate and Rhythm: Normal rate and regular rhythm  Pulmonary:      Effort: Pulmonary effort is normal       Breath sounds: Normal breath sounds  Abdominal:      General: There is no distension  Musculoskeletal:      Cervical back: Normal range of motion and neck supple  Skin:     General: Skin is warm and dry  Neurological:      Mental Status: She is alert  Coordination: Finger-Nose-Finger Test normal        Neurologic Exam     Mental Status   Attempted to see patient multiple times this morning, final attempt patient is sitting in bedside chair, awake, oriented to self as well as location  Can state the month, states year is 2021  Names the current and previous president correctly  Trouble with calculations  Reads NIHSS card sentences and names objects without aphasia nor dysarthria  Follows central/appendicular commands including 2 step  Cranial Nerves     CN II   Visual fields full to confrontation  CN III, IV, VI   Pupils are equal, round, and reactive to light  Extraocular motions are normal      CN V   Facial sensation intact  CN VII   Left facial weakness: Slight NL fold decrease on left      CN VIII   CN VIII normal      CN IX, X   CN IX normal    CN X normal      CN XI   CN XI normal      CN XII   CN XII normal      Motor Exam   Slouched to the left in chair, limiting accurate strength testing:  Minimal left upper extremity weakness compared to right (unclear if due to positioning versus true weakness), 3, 4/5 bilateral hip flexion, 4/5 knee flexion, 5/5 knee extension and dorsiflexion/plantar flexion (there is touch of weakness in the left lower extremity where compared to the right     Sensory Exam     Distal sensory loss to multiple modalities, symmetric (light touch, vibration,, temperature), no clear extinction with double simultaneous tactile stimuli (although does state her right leg" is being touched when actually her left leg is)     Gait, Coordination, and Reflexes     Coordination   Finger to nose coordination: normal    Tremor   Resting tremor: absent  Intention tremor: absent  Reflexes are approximately 1+ throughout, no hyper-reflexia elicited, no ankle clonus, toes are equivocal        Lab Results:   CBC:   Results from last 7 days   Lab Units 01/05/22  0644 01/04/22  1015 01/04/22  0444   WBC Thousand/uL 15 77* 9 07 8 29   RBC Million/uL 4 44 3 96 3 56*   HEMOGLOBIN g/dL 12 4 11 3* 10 2*   HEMATOCRIT % 40 5 36 0 32 7*   MCV fL 91 91 92   PLATELETS Thousands/uL 239 183 159   , BMP/CMP:   Results from last 7 days   Lab Units 01/04/22  1015 01/04/22  0444 01/03/22  1941 01/03/22  1940   SODIUM mmol/L 142 142  --  144   POTASSIUM mmol/L 4 1 3 3*  --  3 3*   CHLORIDE mmol/L 106 107  --  107   CO2 mmol/L 26 24  --  23   CO2, I-STAT mmol/L  --   --  24  --    BUN mg/dL 13 14  --  15   CREATININE mg/dL 1 45* 1 53*  --  1 78*   GLUCOSE, ISTAT mg/dl  --   --  98  --    CALCIUM mg/dL 8 9 8 6  --  9 1   EGFR ml/min/1 73sq m 35 33  --  27   , Vitamin B12:   , HgBA1C:   , TSH:   , Coagulation:   Results from last 7 days   Lab Units 01/03/22  1940   INR  1 13   , Lipid Profile:   , Ammonia:   , Urinalysis:   Results from last 7 days   Lab Units 01/04/22  1449   COLOR UA  Light Yellow   CLARITY UA  Cloudy   SPEC GRAV UA  1 010   PH UA  5 5   LEUKOCYTES UA  Moderate*   NITRITE UA  Negative   GLUCOSE UA mg/dl Negative   KETONES UA mg/dl Negative   BILIRUBIN UA  Negative   BLOOD UA  Trace-Intact*   , Drug Screen:   , Medication Drug Levels:       Invalid input(s): CARBAMAZEPINE,  PHENOBARB, LACOSAMIDE, OXCARBAZEPINE  Imaging Studies: I have personally reviewed pertinent films in PACS   MRI brain wo contrast   Final Result by Leyla Bradley MD (01/04 2349)      Right basal ganglia and right caudate acute ischemia  Findings were marked as "immediate"in Epic and will now be related to the ordering physician or covering clinical team by the radiology liaison  Workstation performed: [id number]         CT head wo contrast   Final Result by Johan Rodriguez DO (01/04 2050)      No acute intracranial abnormality  Workstation performed: [id number]         XR knee 1 or 2 vw left   Final Result by Ger Christianson MD (01/04 1658)   No acute osseous abnormality  Osteoarthritis and joint effusion  Other nonemergent findings above  Workstation performed: [id number]         TRAUMA - CT head wo contrast   Final Result by Maryam Diaz DO (01/03 2053)   Addendum 1 of 1 by Maryam Diaz DO (01/03 2053)   ADDENDUM:   Comparison was made to a previous brain MRI performed under separate    medical record number on November 1, 2021  I personally discussed this study with Rosana Christine on 1/3/2022 at 8:43    PM       Final   No acute intracranial abnormality  Workstation performed: [id number]         TRAUMA - CT spine cervical wo contrast   Final Result by Maryam Diaz DO (01/03 2051)   Addendum 1 of 1 by Maryam Diaz DO (01/03 2051)   ADDENDUM:   I personally discussed this study with Rosana Christine on 1/3/2022 at 8:43    PM          Final   No cervical spine fracture or traumatic malalignment  Workstation performed: [id number]         TRAUMA - CT chest abdomen pelvis w contrast   Final Result by Maryam Diaz DO (01/03 2051)   Suspected small post traumatic hematoma measuring 2 cm in the soft tissues of the right buttock/perianal region  No active bleeding  No visceral or osseous injury identified        Scattered groundglass opacities in both lungs which are new from prior exam   Consider infectious or inflammatory etiologies including Covid viral pneumonia  Previous right lower lobe lung mass has diminished since July  Previous left upper lobe tumor has also decreased in size but appears more atelectatic  Increasing water attenuation pleural fluid, partially loculated pleural fluid in the left lower lung  Follow-up per cancer imaging protocol  I personally discussed this study with Anita Preston on 1/3/2022 at 8:43 PM          Stable pancreatic cystic lesions follow-up per protocol  Small hiatal hernia with gastroesophageal reflux  Colonic diverticulosis  Mild bladder wall thickening which may be Due to incomplete distention  Workstation performed: [id number]         XR Trauma multiple (SLB/SLRA trauma bay ONLY)   Final Result by Clinton Alvarado DO (01/03 2050)      No acute cardiopulmonary disease within limitations of supine imaging  Chronic opacity left upper lung known to be tumor by prior imaging  No acute fracture or dislocation right ankle  Workstation performed: [id number]         XR chest 1 view    (Results Pending)   XR ankle 2 vw right    (Results Pending)       EKG, Pathology, and Other Studies: I have personally reviewed pertinent reports  VTE Prophylaxis: Sequential compression device (Venodyne)  and Eliquis    Code Status: Level 3 - DNAR and DNI    Total time spent today 55 minutes

## 2022-01-05 NOTE — ASSESSMENT & PLAN NOTE
No results found for: HGBA1C    Recent Labs     01/04/22  0734 01/04/22  1150 01/04/22  1620   POCGLU 97 142* 145*       Blood Sugar Average: Last 72 hrs:    · Last A1c 10/2021 was 6 8  · Will hold Tradjenta and metformin  · Initiate subcut insulin

## 2022-01-05 NOTE — ASSESSMENT & PLAN NOTE
· Patient apparently has previous history of C diff and has had fairly persistent diarrhea since  Unclear if this is related to chemotherapy or if perhaps patient has not cleared her C diff infection; noted that he she actually had C diff positive PCR but not EIA in October  Therefore she should be on prophylactic vancomycin if she is requiring antibiotics for urinary tract infection  · Recommend rechecking for C diff now to see if she is still truly positive and requires additional treatment  Would not provide any p r n   Imodium until this result comes back - yet to be collected as she has not had any stool

## 2022-01-05 NOTE — ASSESSMENT & PLAN NOTE
Potassium   Date Value Ref Range Status   01/04/2022 4 1 3 5 - 5 3 mmol/L Final   01/04/2022 3 3 (L) 3 5 - 5 3 mmol/L Final   01/03/2022 3 3 (L) 3 5 - 5 3 mmol/L Final     · Repletion provided, continue to monitor

## 2022-01-05 NOTE — ASSESSMENT & PLAN NOTE
· Continue home amlodipine, metoprolol, sotalol  · Per daughter, patient was recently started on amlodipine  Will closely monitor BP  Patient is hypertensive on admission, SBP >220  Improved with PM antihypertensives  Continue to monitor BP  · Hold home Lasix and losartan due to kidney functioning  · Hydralazine as needed - B/P's remain elevated  · Follow-up with family medicine provider as an OP

## 2022-01-05 NOTE — PLAN OF CARE
Problem: MOBILITY - ADULT  Goal: Maintain or return to baseline ADL function  Description: INTERVENTIONS:  -  Assess patient's ability to carry out ADLs; assess patient's baseline for ADL function and identify physical deficits which impact ability to perform ADLs (bathing, care of mouth/teeth, toileting, grooming, dressing, etc )  - Assess/evaluate cause of self-care deficits   - Assess range of motion  - Assess patient's mobility; develop plan if impaired  - Assess patient's need for assistive devices and provide as appropriate  - Encourage maximum independence but intervene and supervise when necessary  - Involve family in performance of ADLs  - Assess for home care needs following discharge   - Consider OT consult to assist with ADL evaluation and planning for discharge  - Provide patient education as appropriate  Outcome: Progressing  Goal: Maintains/Returns to pre admission functional level  Description: INTERVENTIONS:  - Perform BMAT or MOVE assessment daily    - Set and communicate daily mobility goal to care team and patient/family/caregiver     - Collaborate with rehabilitation services on mobility goals if consulted  - Out of bed for meals 3 times a day  - Out of bed for toileting  - Record patient progress and toleration of activity level   Outcome: Progressing     Problem: Prexisting or High Potential for Compromised Skin Integrity  Goal: Skin integrity is maintained or improved  Description: INTERVENTIONS:  - Identify patients at risk for skin breakdown  - Assess and monitor skin integrity  - Assess and monitor nutrition and hydration status  - Monitor labs   - Assess for incontinence   - Turn and reposition patient  - Assist with mobility/ambulation  - Relieve pressure over bony prominences  - Avoid friction and shearing  - Provide appropriate hygiene as needed including keeping skin clean and dry  - Evaluate need for skin moisturizer/barrier cream  - Collaborate with interdisciplinary team   - Patient/family teaching  - Consider wound care consult   Outcome: Progressing     Problem: Potential for Falls  Goal: Patient will remain free of falls  Description: INTERVENTIONS:  - Educate patient/family on patient safety including physical limitations  - Instruct patient to call for assistance with activity   - Consult OT/PT to assist with strengthening/mobility   - Keep Call bell within reach  - Keep bed low and locked with side rails adjusted as appropriate  - Keep care items and personal belongings within reach  - Initiate and maintain comfort rounds  - Make Fall Risk Sign visible to staff  - Initiate/Maintain bed/chair alarm  - Obtain necessary fall risk management equipment  - Apply yellow socks and bracelet for high fall risk patients  - Consider moving patient to room near nurses station  Outcome: Progressing     Problem: Nutrition/Hydration-ADULT  Goal: Nutrient/Hydration intake appropriate for improving, restoring or maintaining nutritional needs  Description: Monitor and assess patient's nutrition/hydration status for malnutrition  Collaborate with interdisciplinary team and initiate plan and interventions as ordered  Monitor patient's weight and dietary intake as ordered or per policy  Utilize nutrition screening tool and intervene as necessary  Determine patient's food preferences and provide high-protein, high-caloric foods as appropriate       INTERVENTIONS:  - Monitor oral intake, urinary output, labs, and treatment plans  - Assess nutrition and hydration status and recommend course of action  - Evaluate amount of meals eaten  - Assist patient with eating if necessary   - Allow adequate time for meals  - Recommend/ encourage appropriate diets, oral nutritional supplements, and vitamin/mineral supplements  - Order, calculate, and assess calorie counts as needed  - Recommend, monitor, and adjust tube feedings and TPN/PPN based on assessed needs  - Assess need for intravenous fluids  - Provide specific nutrition/hydration education as appropriate  - Include patient/family/caregiver in decisions related to nutrition  Outcome: Progressing

## 2022-01-05 NOTE — ASSESSMENT & PLAN NOTE
- Follows with [facility] hematology/oncology in the outpatient setting   - On chemotherapy regimen

## 2022-01-05 NOTE — PROGRESS NOTES
[facility]  Progress Note Josh Maurer 1950, 66 y o  female MRN: [id number]  Unit/Bed#: S -01 Encounter: [id number]  Primary Care Provider: Selina Chacon DO   Date and time admitted to hospital: 1/3/2022  7:30 PM    * Acute ischemic stroke [facility])  Assessment & Plan  · Patient's initial reason for admission was multiple falls also with reports by her daughter of intermittent confusion  · CTH negative  MRI without gadolinium showed Right basal ganglia and right caudate acute ischemia  · Continue Lipitor  · Neuro consult appreciated-- case discussed with them  Plan to repeat MRI with gadolinium and do MRA to see whether this is indeed ischemic changes verses metastasis  · Echo unremarkable  · Patient with known h/o Afib no need for Tele  Continue Eliquis AC; per Neurology no need for aspirin at this time  · PT/OT evals noted but would recommend re-evaluation  · allow for permissive HTN    Cystitis  Assessment & Plan  · Abnormal UA with reported new onset urinary frequency per my colleague; of note patient denied all urinary symptoms to me during my evaluation yesterday  · UCx in process  · Given Cipro x 1 then transitioned to rocephin 1/5/22      Multiple falls  Assessment & Plan  · Patient reported to have multiple falls and reports of confusion and also with headaches/fatigue recently; abnormal MRI of the brain see above  · Head CT unremarkable  · COVID negative  · Already received IV fluids on admission for suspected dehydration  · PT/OT re-evaluation; trauma no longer needs to follow patient and therefore our service will be primary    [facility] ([facility] )  Assessment & Plan  · Continue home meds including sotalol, metoprolol and Eliquis    Hypertension  Assessment & Plan  · Continue lopressor & stoalol from home   Holding losartan from home today for permissive HTN in acute CVA  · Treat pain appropriately    Chronic kidney disease, stage 3 ([facility] )  Assessment & Plan  Lab Results   Component Value Date    EGFR 35 01/04/2022    EGFR 33 01/04/2022    EGFR 27 01/03/2022    CREATININE 1 45 (H) 01/04/2022    CREATININE 1 53 (H) 01/04/2022    CREATININE 1 78 (H) 01/03/2022   Creatinine remains within her baseline  Avoid nephrotoxic agents  Noted she has not received any Lasix during the hospitalization and does have leg edema, would resume Lasix at 20 mg daily for now and monitor renal function    Lung cancer ([facility] )  Assessment & Plan  · History of lung cancer on chemotherapy    Ground glass opacity present on imaging of lung  Assessment & Plan  · Abnormal CT with ground-glass opacities in bilateral lower lobes  Patient tested negative for COVID and reports no respiratory complaints  Would observe    Diarrhea  Assessment & Plan  · Patient apparently has previous history of C diff and has had fairly persistent diarrhea since  Unclear if this is related to chemotherapy or if perhaps patient has not cleared her C diff infection; noted that he she actually had C diff positive PCR but not EIA in October  Therefore she should be on prophylactic vancomycin if she is requiring antibiotics for urinary tract infection  · Recommend rechecking for C diff now to see if she is still truly positive and requires additional treatment  Would not provide any p r n   Imodium until this result comes back - yet to be collected as she has not had any stool    Hypokalemia  Assessment & Plan  Potassium   Date Value Ref Range Status   01/04/2022 4 1 3 5 - 5 3 mmol/L Final   01/04/2022 3 3 (L) 3 5 - 5 3 mmol/L Final   01/03/2022 3 3 (L) 3 5 - 5 3 mmol/L Final     · Repletion provided, continue to monitor    Buttocks Hematoma  Assessment & Plan  · Evaluated by Trauma and cleared to continue anticoagulation    Diabetes type 2, controlled [facility])  Assessment & Plan  No results found for: HGBA1C    Recent Labs     01/04/22  0734 01/04/22  1150 01/04/22  1620   POCGLU 97 142* 145*       Blood Sugar Average: Last 72 hrs:  (P) 128 due for updated A1c  Monitor on Accu-Cheks  Diabetic diet    VTE Pharmacologic Prophylaxis:   Pharmacologic: Apixaban (Eliquis)  Mechanical VTE Prophylaxis in Place: No    Patient Centered Rounds: spoke with RN    Discussions with Specialists or Other Care Team Provider:  Case was discussed with case management, Neurology Trauma    Education and Discussions with Family / Patient:  Updated patient's daughter over the phone    Time Spent for Care: 30 minutes  More than 50% of total time spent on counseling and coordination of care as described above  Current Length of Stay: 1 day(s)    Current Patient Status: Inpatient   Certification Statement: The patient will continue to require additional inpatient hospital stay due to Ongoing evaluation of abnormal MRI    Discharge Plan:  Reassess possible need for rehab, but not medically stable for discharge at time    Code Status: Level 3 - DNAR and DNI      Subjective:   Nursing reports patient has been more confused overnight saying that she is outside in the snow or she is at her house  Also noted that she has been intermittently more lethargic  During my evaluation today she was in fact quite lethargic and hardly answering any questions, but neurology notes that previously she was alert when they evaluated her at a different time of day  Objective:     Vitals:   Temp (24hrs), Av °F (36 7 °C), Min:97 6 °F (36 4 °C), Max:98 8 °F (37 1 °C)    Temp:  [97 6 °F (36 4 °C)-98 8 °F (37 1 °C)] 98 °F (36 7 °C)  HR:  [46-65] 46  Resp:  [17-18] 18  BP: (158-220)/(66-91) 190/70  SpO2:  [95 %-98 %] 96 %  Body mass index is 30 55 kg/m²  Input and Output Summary (last 24 hours): Intake/Output Summary (Last 24 hours) at 2022 1324  Last data filed at 2022 0942  Gross per 24 hour   Intake 0 ml   Output 900 ml   Net -900 ml       Physical Exam:     Physical Exam  Vitals reviewed     Constitutional:       General: She is not in acute distress  Appearance: She is obese  She is not ill-appearing, toxic-appearing or diaphoretic  Comments: Patient seen lying in bed getting her echocardiogram, noted to be very lethargic at this time   Eyes:      General: No scleral icterus  Right eye: No discharge  Left eye: No discharge  Cardiovascular:      Rate and Rhythm: Normal rate and regular rhythm  Heart sounds: No murmur heard  Pulmonary:      Effort: No respiratory distress  Breath sounds: No stridor  No wheezing or rhonchi  Comments: Port in right chest wall  Abdominal:      General: There is no distension  Tenderness: There is no guarding  Musculoskeletal:      Right lower leg: Edema present  Left lower leg: Edema present  Comments: 2+ pitting edema bilateral lower extremities noted   Skin:     General: Skin is warm and dry  Coloration: Skin is not jaundiced or pale  Findings: No bruising, erythema, lesion or rash  Neurological:      Comments: Patient lying in bed quite lethargic no evidence tremor noted         Additional Data:     Labs:    Results from last 7 days   Lab Units 01/05/22  0644   WBC Thousand/uL 15 77*   HEMOGLOBIN g/dL 12 4   HEMATOCRIT % 40 5   PLATELETS Thousands/uL 239   NEUTROS PCT % 88*   LYMPHS PCT % 5*   MONOS PCT % 5   EOS PCT % 0     Results from last 7 days   Lab Units 01/05/22  0644   SODIUM mmol/L 139   POTASSIUM mmol/L 4 1   CHLORIDE mmol/L 105   CO2 mmol/L 22   BUN mg/dL 12   CREATININE mg/dL 1 30   ANION GAP mmol/L 12   CALCIUM mg/dL 9 7   GLUCOSE RANDOM mg/dL 152*     Results from last 7 days   Lab Units 01/03/22  1940   INR  1 13     Results from last 7 days   Lab Units 01/05/22  1111 01/05/22  0728 01/04/22  1620 01/04/22  1150 01/04/22  0734   POC GLUCOSE mg/dl 145* 162* 145* 142* 97     Results from last 7 days   Lab Units 01/05/22  0644   HEMOGLOBIN A1C % 5 8*           * I Have Reviewed All Lab Data Listed Above    * Additional Pertinent Lab Tests Reviewed: [facility]    Imaging:    Imaging Reports Reviewed Today Include:  MRI of the brain  Imaging Personally Reviewed by Myself Includes:      Recent Cultures (last 7 days):           Last 24 Hours Medication List:   Current Facility-Administered Medications   Medication Dose Route Frequency Provider Last Rate    acetaminophen  650 mg Oral Q6H PRN EDUARDO Hernández      apixaban  5 mg Oral BID EDUARDO Hernández      atorvastatin  40 mg Oral Daily With [place] [employer]      busPIRone  7 5 mg Oral BID EDUARDO Hernández      cefTRIAXone  1,000 mg Intravenous Q24H EDUARDO Vanessa 1,000 mg (01/05/22 0659)    cholecalciferol  2,000 Units Oral Daily EDUARDO Hernández      cyanocobalamin  100 mcg Oral Daily EDUARDO Hernández      folic acid  1 mg Oral Daily EDUARDO Hernández      furosemide  20 mg Oral Daily Palmira Clifton PA-C      hydrALAZINE  10 mg Intravenous Q6H PRN Turner Choi MD      insulin lispro  1-6 Units Subcutaneous TID AC EDUARDO Hernández      loratadine  10 mg Oral Daily EDUARDO Hernández      [START ON 1/6/2022] losartan  50 mg Oral Daily [place], [place]      metoprolol tartrate  25 mg Oral Q12H [facility] EDUARDO Hernández      pantoprazole  40 mg Oral Early Morning EDUARDO Hernández      prochlorperazine  10 mg Oral Q12H PRN EDUARDO Hernández      sotalol  80 mg Oral BID EDUARDO Hernández      vancomycin  125 mg Oral Q12H [facility] Palmira Clifton PA-C      venlafaxine  37 5 mg Oral HS EDUARDO Hernández          Today, Patient Was Seen By: Jose Wall PA-C    ** Please Note: Dictation voice to text software may have been used in the creation of this document   ** 03-Nov-2018 19:27

## 2022-01-05 NOTE — QUICK NOTE
Patient seen and re-evaluated  Her gluteal hematoma has remained stable without evidence of active or ongoing bleeding  On serial evaluations of hemoglobin, her hemoglobin has actually increased with a hemoglobin of 12 4 on 01/05/2022  There is no additional workup or intervention necessary from a trauma standpoint at this time and the patient is medically appropriate for discharge from the trauma service when medically appropriate  Patient continues to have multiple medical conditions requiring further inpatient care including infectious etiology suspected to be a UTI with IV antibiotic initiated overnight  Additionally, patient does have a current acute encephalopathy which is likely multifactorial in setting of suspected infection, but family input also notes some cognitive decline prior to presentation recently  Workup including the CT scans of the head initially on 01/03/2022 and repeated on 01/04/2022 as well as MRI of the brain obtained per internal medicine were reviewed with the MRI demonstrating acute ischemia in the right basal ganglia and right caudate area  She has been placed on the stroke pathway with additional workup including echocardiogram and Neurology consultation pending  After discussion with the [facility] this morning, patient will be transferred to the [place] service  Trauma service will sign off at this time with outpatient follow-up regards to her gluteal hematoma is arranged  Trauma service will remain available for further evaluation and can be reached via the trauma attending phone or tiger connect role      Jeannie Saleh PA-C  1/5/2022 10:49 AM

## 2022-01-05 NOTE — ASSESSMENT & PLAN NOTE
Lab Results   Component Value Date    EGFR 31 01/06/2022    EGFR 40 01/05/2022    EGFR 35 01/04/2022    CREATININE 1 59 (H) 01/06/2022    CREATININE 1 30 01/05/2022    CREATININE 1 45 (H) 01/04/2022     -  Trending creatinine/BUN / GFR, avoid nephrotoxic agents

## 2022-01-05 NOTE — PROGRESS NOTES
Pt had ct of head and MRI last night, CT WNL, MRI showed Right basal ganglia and right caudate acute ischemia  This RN notified trauma NP covering overnight and asked if radiology had notified her of the findings, she had not been updated  NP arrived to assess patient  Cognition waxed and waned throughout the night, pt knowing who she was and for the most part where she was, but unaware of time and situation, she had some disorganized thinking also at times, this was all intermittent  No noticeable flaccidity or weakness on on side of the body verses the other, soraida +2  Pt also c/o headache for a portion of the night, bp was high, this info was passed on to the trauma NP at the beginning of the evening and more antihypertensives were added, which did help to bring down her bp which in turn did help her headache  Pt also felt the urge to urinate every 30" to hour, she was unable to become comfortable with the purewick, so she wanted to get OOB each time she had to go tot the bathroom  Trauma and [facility] team aware of +UA, culture pending  Rocephin IV started this AM  Pt had been in bed for a good portion of the night, but by the early am hours she wanted to sit in a recliner  Trauma NP spoke with her attending per her conversation with myself and the decision was made to not call a stroke alert, trauma NP also notified [facility] MD    Pts IV access was lost around [phone], after a 30 mins of looking for access, charge nurse was asked if she could assist with inserting an IV  IV access was obtained in right brachial  During that time, orders had been placed for pt to be on stroke pathway

## 2022-01-05 NOTE — ASSESSMENT & PLAN NOTE
· Continue lopressor & stoalol from home   Holding losartan from home today for permissive HTN in acute CVA  · Treat pain appropriately

## 2022-01-05 NOTE — ASSESSMENT & PLAN NOTE
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 of the lung with left scapula involvement diagnosed on 08/2016 " She has received various treatments since then  [place] disease was reported to have progressed on 8/2018 and again on 07/2021  Her current treatment includes Nivolumab 240 mg flat dose every 3 weeks and  carboplatin AUC 5 added to  pemetrexed which was dose reduced to 250 milligram/meter squared, initiated 8/2/2021  · 01/04/2022 CT CAP appears showed decreasing size of lung tumors  · Next treatment is this Friday  Appetite has been poor, possibly contributing to current kidney functioning  · Continue Compazine as needed for nausea  · Continue treatment and as scheduled follow-up with oncology    · Medicine ordered MRI to rule out mets, results note, discussed with my attending and notifies [facility] attending

## 2022-01-05 NOTE — ASSESSMENT & PLAN NOTE
66year old female with atrial fibrillation maintained on Eliquis, advanced stage lung CA on chemotherapy, CKD, history of uterine CA who initially presented to the hospital on 1/3/2022 as a level B trauma alert after multiple falls at home  Initial imaging demonstrated posttraumatic right buttocks hematoma, otherwise no acute bleeding or traumatic injury  Medicine team was asked to evaluate for altered mental status and frequent HA and MRI brain was performed which demonstrated right basal ganglia/caudate infarct/ischemia and neurology team was asked to further evaluate patient  MRI brain with contrast ordered to evaluate for underlying metastasis and this study was completed 1/5/2022 and was negative for enhancing lesion  Contacted this AM by medicine team to evaluate given increased lethargy and altered mental status  Per review of chart and discussion with medicine team, patient has had fluctuating mental status of unclear etiology  She is currently receiving antibiotics for possible UTI and also noted to have significantly elevated BP with SBP 190s-200s currently  Possible that altered mental status is secondary to metabolic encephalopathy in setting of elevated BP and also possible UTI  Cannot entirely exclude seizure, although no seizure-like activity noted and patient is answering some questions and able to follow some commands  Plan:   - MRI brain with contrast completed 1/5/2022 given history of lung cancer and no enhancing lesions noted  - MRA head and neck completed and no occlusive disease noted  - Echocardiogram completed, EF 60%, mild aortic regurgitation    - Continue on Eliquis 5 mg BID    - Continue atorvastatin 80 mg QPM   - Hemoglobin A1c reviewed, 5 8    - Fasting lipid panel reviewed, total cholesterol 210, triglycerides 252, HDL 41,    - Will check routine EEG    - Continue Telemetry monitoring   - PT/OT   - Stroke education    - Monitor exam and notify with changes

## 2022-01-05 NOTE — ASSESSMENT & PLAN NOTE
· Patient reported to have multiple falls and reports of confusion and also with headaches/fatigue recently; abnormal MRI of the brain see above  · Head CT unremarkable  · COVID negative  · Already received IV fluids on admission for suspected dehydration  · PT/OT re-evaluation; trauma no longer needs to follow patient and therefore our service will be primary

## 2022-01-05 NOTE — ASSESSMENT & PLAN NOTE
· Abnormal UA with reported new onset urinary frequency per my colleague; of note patient denied all urinary symptoms to me during my evaluation yesterday  · UCx in process  · Given Cipro x 1 then transitioned to rocephin 1/5/22

## 2022-01-05 NOTE — ASSESSMENT & PLAN NOTE
Lab Results   Component Value Date    EGFR 35 01/04/2022    EGFR 33 01/04/2022    EGFR 27 01/03/2022    CREATININE 1 45 (H) 01/04/2022    CREATININE 1 53 (H) 01/04/2022    CREATININE 1 78 (H) 01/03/2022   Creatinine remains within her baseline  Avoid nephrotoxic agents  Noted she has not received any Lasix during the hospitalization and does have leg edema, would resume Lasix at 20 mg daily for now and monitor renal function

## 2022-01-05 NOTE — ASSESSMENT & PLAN NOTE
Magnesium   Date Value Ref Range Status   01/04/2022 1 7 1 6 - 2 6 mg/dL Final   01/04/2022 1 3 (L) 1 6 - 2 6 mg/dL Final     · Replete & recheck

## 2022-01-06 ENCOUNTER — APPOINTMENT (INPATIENT)
Dept: CT IMAGING | Facility: HOSPITAL | Age: 72
DRG: 064 | End: 2022-01-06
Payer: MEDICARE

## 2022-01-06 ENCOUNTER — APPOINTMENT (INPATIENT)
Dept: NEUROLOGY | Facility: HOSPITAL | Age: 72
DRG: 064 | End: 2022-01-06
Payer: MEDICARE

## 2022-01-06 PROBLEM — R33.9 URINARY RETENTION: Status: ACTIVE | Noted: 2022-01-06

## 2022-01-06 PROBLEM — G93.40 ENCEPHALOPATHY: Status: ACTIVE | Noted: 2022-01-06

## 2022-01-06 LAB
ANION GAP SERPL CALCULATED.3IONS-SCNC: 11 MMOL/L (ref 4–13)
BACTERIA UR CULT: ABNORMAL
BUN SERPL-MCNC: 16 MG/DL (ref 5–25)
CALCIUM SERPL-MCNC: 9.1 MG/DL (ref 8.3–10.1)
CHLORIDE SERPL-SCNC: 106 MMOL/L (ref 100–108)
CO2 SERPL-SCNC: 24 MMOL/L (ref 21–32)
CREAT SERPL-MCNC: 1.59 MG/DL (ref 0.6–1.3)
FLUAV RNA RESP QL NAA+PROBE: NEGATIVE
FLUBV RNA RESP QL NAA+PROBE: NEGATIVE
GFR SERPL CREATININE-BSD FRML MDRD: 31 ML/MIN/1.73SQ M
GLUCOSE SERPL-MCNC: 160 MG/DL (ref 65–140)
GLUCOSE SERPL-MCNC: 162 MG/DL (ref 65–140)
GLUCOSE SERPL-MCNC: 188 MG/DL (ref 65–140)
GLUCOSE SERPL-MCNC: 202 MG/DL (ref 65–140)
GLUCOSE SERPL-MCNC: 245 MG/DL (ref 65–140)
POTASSIUM SERPL-SCNC: 3.4 MMOL/L (ref 3.5–5.3)
RSV RNA RESP QL NAA+PROBE: NEGATIVE
SARS-COV-2 RNA RESP QL NAA+PROBE: NEGATIVE
SODIUM SERPL-SCNC: 141 MMOL/L (ref 136–145)

## 2022-01-06 PROCEDURE — 99233 SBSQ HOSP IP/OBS HIGH 50: CPT | Performed by: PSYCHIATRY & NEUROLOGY

## 2022-01-06 PROCEDURE — 99232 SBSQ HOSP IP/OBS MODERATE 35: CPT | Performed by: PHYSICIAN ASSISTANT

## 2022-01-06 PROCEDURE — 80048 BASIC METABOLIC PNL TOTAL CA: CPT | Performed by: PHYSICIAN ASSISTANT

## 2022-01-06 PROCEDURE — U0003 INFECTIOUS AGENT DETECTION BY NUCLEIC ACID (DNA OR RNA); SEVERE ACUTE RESPIRATORY SYNDROME CORONAVIRUS 2 (SARS-COV-2) (CORONAVIRUS DISEASE [COVID-19]), AMPLIFIED PROBE TECHNIQUE, MAKING USE OF HIGH THROUGHPUT TECHNOLOGIES AS DESCRIBED BY CMS-2020-01-R: HCPCS | Performed by: PHYSICIAN ASSISTANT

## 2022-01-06 PROCEDURE — 82948 REAGENT STRIP/BLOOD GLUCOSE: CPT

## 2022-01-06 PROCEDURE — 95816 EEG AWAKE AND DROWSY: CPT

## 2022-01-06 PROCEDURE — 95816 EEG AWAKE AND DROWSY: CPT | Performed by: PSYCHIATRY & NEUROLOGY

## 2022-01-06 PROCEDURE — G1004 CDSM NDSC: HCPCS

## 2022-01-06 PROCEDURE — 70450 CT HEAD/BRAIN W/O DYE: CPT

## 2022-01-06 PROCEDURE — 0241U HB NFCT DS VIR RESP RNA 4 TRGT: CPT | Performed by: PHYSICIAN ASSISTANT

## 2022-01-06 RX ORDER — AMLODIPINE BESYLATE 5 MG/1
5 TABLET ORAL DAILY
Status: DISCONTINUED | OUTPATIENT
Start: 2022-01-07 | End: 2022-01-07

## 2022-01-06 RX ORDER — LOSARTAN POTASSIUM 50 MG/1
50 TABLET ORAL DAILY
Status: DISCONTINUED | OUTPATIENT
Start: 2022-01-06 | End: 2022-01-10

## 2022-01-06 RX ORDER — LANOLIN ALCOHOL/MO/W.PET/CERES
3 CREAM (GRAM) TOPICAL
Status: DISCONTINUED | OUTPATIENT
Start: 2022-01-06 | End: 2022-01-09

## 2022-01-06 RX ORDER — ASPIRIN 81 MG/1
81 TABLET ORAL DAILY
Status: DISCONTINUED | OUTPATIENT
Start: 2022-01-06 | End: 2022-01-11 | Stop reason: HOSPADM

## 2022-01-06 RX ORDER — DOCUSATE SODIUM 100 MG/1
100 CAPSULE, LIQUID FILLED ORAL 2 TIMES DAILY PRN
Status: DISCONTINUED | OUTPATIENT
Start: 2022-01-06 | End: 2022-01-11 | Stop reason: HOSPADM

## 2022-01-06 RX ORDER — POTASSIUM CHLORIDE 14.9 MG/ML
20 INJECTION INTRAVENOUS ONCE
Status: COMPLETED | OUTPATIENT
Start: 2022-01-06 | End: 2022-01-06

## 2022-01-06 RX ADMIN — SOTALOL HYDROCHLORIDE 80 MG: 80 TABLET ORAL at 08:36

## 2022-01-06 RX ADMIN — METOPROLOL TARTRATE 25 MG: 25 TABLET, FILM COATED ORAL at 08:36

## 2022-01-06 RX ADMIN — HYDRALAZINE HYDROCHLORIDE 10 MG: 20 INJECTION, SOLUTION INTRAMUSCULAR; INTRAVENOUS at 11:15

## 2022-01-06 RX ADMIN — APIXABAN 5 MG: 5 TABLET, FILM COATED ORAL at 08:36

## 2022-01-06 RX ADMIN — VENLAFAXINE HYDROCHLORIDE 37.5 MG: 37.5 CAPSULE, EXTENDED RELEASE ORAL at 21:02

## 2022-01-06 RX ADMIN — LOSARTAN POTASSIUM 50 MG: 50 TABLET, FILM COATED ORAL at 08:36

## 2022-01-06 RX ADMIN — Medication 3 MG: at 21:02

## 2022-01-06 RX ADMIN — VITAM B12 100 MCG: 100 TAB at 08:36

## 2022-01-06 RX ADMIN — Medication 125 MG: at 20:58

## 2022-01-06 RX ADMIN — INSULIN LISPRO 1 UNITS: 100 INJECTION, SOLUTION INTRAVENOUS; SUBCUTANEOUS at 18:48

## 2022-01-06 RX ADMIN — Medication 2000 UNITS: at 08:36

## 2022-01-06 RX ADMIN — FUROSEMIDE 20 MG: 20 TABLET ORAL at 08:37

## 2022-01-06 RX ADMIN — INSULIN LISPRO 2 UNITS: 100 INJECTION, SOLUTION INTRAVENOUS; SUBCUTANEOUS at 13:37

## 2022-01-06 RX ADMIN — BUSPIRONE HYDROCHLORIDE 7.5 MG: 5 TABLET ORAL at 20:58

## 2022-01-06 RX ADMIN — Medication 125 MG: at 09:19

## 2022-01-06 RX ADMIN — POTASSIUM CHLORIDE 20 MEQ: 200 INJECTION, SOLUTION INTRAVENOUS at 11:05

## 2022-01-06 RX ADMIN — LOSARTAN POTASSIUM 50 MG: 50 TABLET, FILM COATED ORAL at 17:04

## 2022-01-06 RX ADMIN — LORATADINE 10 MG: 10 TABLET ORAL at 08:37

## 2022-01-06 RX ADMIN — CEFTRIAXONE SODIUM 1000 MG: 10 INJECTION, POWDER, FOR SOLUTION INTRAVENOUS at 05:44

## 2022-01-06 RX ADMIN — ASPIRIN 81 MG: 81 TABLET, COATED ORAL at 13:37

## 2022-01-06 RX ADMIN — BUSPIRONE HYDROCHLORIDE 7.5 MG: 5 TABLET ORAL at 08:36

## 2022-01-06 RX ADMIN — ATORVASTATIN CALCIUM 80 MG: 40 TABLET, FILM COATED ORAL at 17:04

## 2022-01-06 RX ADMIN — METOPROLOL TARTRATE 25 MG: 25 TABLET, FILM COATED ORAL at 20:58

## 2022-01-06 RX ADMIN — FOLIC ACID 1 MG: 1 TABLET ORAL at 08:36

## 2022-01-06 RX ADMIN — SOTALOL HYDROCHLORIDE 80 MG: 80 TABLET ORAL at 18:47

## 2022-01-06 RX ADMIN — APIXABAN 5 MG: 5 TABLET, FILM COATED ORAL at 18:47

## 2022-01-06 NOTE — PHYSICAL THERAPY NOTE
PHYSICAL THERAPY CANCELLATION NOTE      Patient Name: Shorty Driscoll  QGVQT'X Date: 1/6/2022 01/06/22 1352   PT Last Visit   PT Visit Date 01/06/22   Note Type   Note Type Cancelled Session   Cancel Reasons   (EEG)   Assessment   Assessment Chart review performed, pt placed on stroke pathway since PT eval, pt undergoing EEG at time attempted to see for PT intervention/potential re-eval  PT will continue to follow     Caity Jett, PT, DPT

## 2022-01-06 NOTE — ASSESSMENT & PLAN NOTE
Lab Results   Component Value Date    HGBA1C 5 8 (H) 01/05/2022       Recent Labs     01/05/22  1646 01/05/22  2117 01/06/22  0719 01/06/22  1109   POCGLU 157* 219* 162* 202*       Blood Sugar Average: Last 72 hrs:  (P) 159 due for updated A1c  Monitor on Accu-Cheks  Diabetic diet

## 2022-01-06 NOTE — ASSESSMENT & PLAN NOTE
Lab Results   Component Value Date    HGBA1C 5 8 (H) 01/05/2022       Recent Labs     01/05/22  1111 01/05/22  1646 01/05/22  2117 01/06/22  0719   POCGLU 145* 157* 219* 162*       Blood Sugar Average: Last 72 hrs:  (P) 153 625     - Goal euglycemia    - Management as per medicine team

## 2022-01-06 NOTE — PROGRESS NOTES
[facility]  Progress Note Skip Hernández 1950, 66 y o  female MRN: [id number]  Unit/Bed#: S -01 Encounter: [id number]  Primary Care Provider: Queenie Downing DO   Date and time admitted to hospital: 1/3/2022  7:30 PM    * Acute ischemic stroke [facility])  Assessment & Plan  · Patient's initial reason for admission was multiple falls also with reports by her daughter of intermittent confusion  · CTH negative  MRI without gadolinium Right basal ganglia and right caudate acute ischemia  Repeat MRI of the brain with gadolinium and MRA imaging both noted to be unremarkable  · Continue Lipitor  · Neuro consult appreciated-- case discussed with them  · Echo unremarkable  · Patient with known h/o Afib no need for Tele  Continue Eliquis AC  Add aspirin per her oncologist Dr Eugenia Zarate for concern of thrombosis caused by chemotherapy  · PT/OT evals noted but would recommend re-evaluation  · No longer need permissive HTN    Encephalopathy  Assessment & Plan  · Fluctuating mental status issues--neurology following, evaluated patient with them this morning  Unclear if this is toxic metabolic encephalopathy or hypertensive encephalopathy or other  · Check EEG  · Recheck COVID  · Consider LP??  · Recheck head CT  · Not compelling evidence this is all from UTI    Cystitis  Assessment & Plan  · Abnormal UA with reported new onset urinary frequency per my colleague; of note patient denied all urinary symptoms to me during my evaluation yesterday  · UCx in process  · Given Cipro x 1 then transitioned to rocephin 1/5/22  · New leukocytosis noted during the stay, continue to monitor      Multiple falls  Assessment & Plan  · Patient reported to have multiple falls (and reports of confusion and also with headaches/fatigue recently); abnormal MRI of the brain see above    Sustained mild contusions/buttock hematoma  · Head CT unremarkable  · PT/OT re-evaluation to determine appropriate discharge plan; trauma cleared patient    Urinary retention  Assessment & Plan  · New onset urinary retention, required CIC x 2; continue on protocol and place Bonner if needed  · Consider urology consult  · Ensure adequate bowel regimen    Atrial fibrillation (HCC)  Assessment & Plan  · Continue home meds including sotalol, metoprolol and Eliquis    Hypertension  Assessment & Plan  · permissive HTN in acute CVA initially but now can bring BP down given that she is significantly and persistently hypertensive      Chronic kidney disease, stage 3 [facility])  Assessment & Plan  Lab Results   Component Value Date    EGFR 31 01/06/2022    EGFR 40 01/05/2022    EGFR 35 01/04/2022    CREATININE 1 59 (H) 01/06/2022    CREATININE 1 30 01/05/2022    CREATININE 1 45 (H) 01/04/2022   Creatinine remains within her baseline  Avoid nephrotoxic agents  Noted she has not received any Lasix during the hospitalization and does have leg edema, gave 1 time Lasix at 20 mg     Lung cancer ([facility] [address] )  Assessment & Plan  · History of lung cancer, metastatic to bone- on chemotherapy being managed by Dr Pat Sandoval  Patient may require a change in her regimen due to acute stroke    Ground glass opacity present on imaging of lung  Assessment & Plan  · Abnormal CT with ground-glass opacities in bilateral lower lobes  Patient tested negative for COVID on admission and reported no respiratory complaints  · Given unusual complaints of fatigue and confusion worsening in the past 48 hours, would recommend repeat COVID test  · Son who visited yesterday developed symptoms of covid following the visit  Will await family's report about whether he tests positive      Diarrhea  Assessment & Plan  · Patient apparently has previous history of C diff and has had fairly persistent diarrhea since  Unclear if this was related to chemotherapy or if perhaps patient has not cleared her C diff infection; noted that he she actually had C diff positive PCR but not EIA in October    Therefore she should be on prophylactic vancomycin if she is requiring antibiotics for urinary tract infection  · Recommend rechecking for C diff now to see if she is still truly positive and requires additional treatment  Would not provide any p r n  Imodium until this result comes back - yet to be collected as she has not had any stool    Diabetes type 2, controlled [facility])  Assessment & Plan  Lab Results   Component Value Date    HGBA1C 5 8 (H) 01/05/2022       Recent Labs     01/05/22  1646 01/05/22  2117 01/06/22  0719 01/06/22  1109   POCGLU 157* 219* 162* 202*       Blood Sugar Average: Last 72 hrs:  (P) 159 due for updated A1c  Monitor on Accu-Cheks  Diabetic diet    VTE Pharmacologic Prophylaxis:   Pharmacologic: Apixaban (Eliquis)  Mechanical VTE Prophylaxis in Place: No    Patient Centered Rounds: I have performed bedside rounds with nursing staff today  Discussions with Specialists or Other Care Team Provider:  I rounded with Neurology advanced practitioner, spoke with her outpatient oncologist and spoke with case management    Education and Discussions with Family / Patient:  Spoke with patient's daughter    Time Spent for Care: 35 min  More than 50% of total time spent on counseling and coordination of care as described above  Current Length of Stay: 2 day(s)    Current Patient Status: Inpatient   Certification Statement: The patient will continue to require additional inpatient hospital stay due to Ongoing neurologic workup of altered mental status    Discharge Plan:  Reassess possible need for rehab placement given decline in mental function    Code Status: Level 3 - DNAR and DNI    Subjective:   Nursing reported to me that patient required clean intermittent catheterization x2 for urinary retention since yesterday  She has not had any diarrhea therefore could not send any samples for C diff  Nursing reports she has remained consistently hypertensive    In the past 12 hours there have been concerns regarding lethargy and refusal to eat and take medicines  Nurses have been questioning whether some of this is intentional or truly alteration in her mental status  Upon evaluation by myself and Neurology together this morning, patient was minimally answering some straightforward yes no questions and denied pain but otherwise was not engaging in conversation and kept her eyes closed    Objective:     Vitals:   Temp (24hrs), Av 1 °F (36 7 °C), Min:97 2 °F (36 2 °C), Max:98 9 °F (37 2 °C)    Temp:  [97 2 °F (36 2 °C)-98 9 °F (37 2 °C)] 98 9 °F (37 2 °C)  HR:  [62-92] 62  Resp:  [16-18] 16  BP: (143-204)/() 184/88  SpO2:  [92 %-96 %] 92 %  Body mass index is 30 55 kg/m²  Input and Output Summary (last 24 hours): Intake/Output Summary (Last 24 hours) at 2022 1252  Last data filed at 2022 0743  Gross per 24 hour   Intake --   Output 2000 ml   Net -2000 ml       Physical Exam:     Physical Exam  Vitals reviewed  Constitutional:       General: She is not in acute distress  Appearance: She is obese  She is not ill-appearing, toxic-appearing or diaphoretic  HENT:      Nose: No congestion  Eyes:      General: No scleral icterus  Cardiovascular:      Rate and Rhythm: Normal rate and regular rhythm  Heart sounds: No murmur heard  Pulmonary:      Effort: No respiratory distress  Breath sounds: No stridor  No wheezing or rhonchi  Abdominal:      General: There is no distension  Palpations: Abdomen is soft  Tenderness: There is no abdominal tenderness  There is no guarding  Musculoskeletal:      Right lower leg: No edema  Left lower leg: No edema  Skin:     General: Skin is warm and dry  Coloration: Skin is not jaundiced or pale  Findings: Bruising present  No erythema, lesion or rash  Neurological:      Comments: Patient noted to be very drowsy, maintained her eyes closed throughout the majority of our interaction    Upon me elevating her arms in the air she did seem to drop her left arm against gravity more rapidly than the right  No evidence of tremor noted  No other rhythmic abnormal movements appreciated  She responded abruptly to irritating stimuli including touching her feet  She did tell the neurology PA her name and current location but could not state the year         Additional Data:     Labs:    Results from last 7 days   Lab Units 01/05/22  0644   WBC Thousand/uL 15 77*   HEMOGLOBIN g/dL 12 4   HEMATOCRIT % 40 5   PLATELETS Thousands/uL 239   NEUTROS PCT % 88*   LYMPHS PCT % 5*   MONOS PCT % 5   EOS PCT % 0     Results from last 7 days   Lab Units 01/06/22  0502   SODIUM mmol/L 141   POTASSIUM mmol/L 3 4*   CHLORIDE mmol/L 106   CO2 mmol/L 24   BUN mg/dL 16   CREATININE mg/dL 1 59*   ANION GAP mmol/L 11   CALCIUM mg/dL 9 1   GLUCOSE RANDOM mg/dL 160*     Results from last 7 days   Lab Units 01/03/22  1940   INR  1 13     Results from last 7 days   Lab Units 01/06/22  1109 01/06/22  0719 01/05/22  2117 01/05/22  1646 01/05/22  1111 01/05/22  0728 01/04/22  1620 01/04/22  1150 01/04/22  0734   POC GLUCOSE mg/dl 202* 162* 219* 157* 145* 162* 145* 142* 97     Results from last 7 days   Lab Units 01/05/22  0644   HEMOGLOBIN A1C % 5 8*           * I Have Reviewed All Lab Data Listed Above  * Additional Pertinent Lab Tests Reviewed:  All Labs Within Last 24 Hours Reviewed    Imaging:    Imaging Reports Reviewed Today Include:   Imaging Personally Reviewed by Myself Includes:      Recent Cultures (last 7 days):     Results from last 7 days   Lab Units 01/04/22  1449   URINE CULTURE  >100,000 cfu/ml Escherichia coli*       Last 24 Hours Medication List:   Current Facility-Administered Medications   Medication Dose Route Frequency Provider Last Rate    acetaminophen  650 mg Oral Q6H PRN EDUARDO Ferrera      apixaban  5 mg Oral BID EDUARDO Ferrera      aspirin  81 mg Oral Daily Palmira Clifton PA-C      atorvastatin  80 mg Oral Daily With Modesta Pérez PA-C      busPIRone  7 5 mg Oral BID Saltillo Noble, CRNP      cefTRIAXone  1,000 mg Intravenous Q24H Magalie Ponce, KRISTENNP 1,000 mg (01/06/22 0544)   Thomas Cojoaquin cholecalciferol  2,000 Units Oral Daily Saltillo Noble, CRNP      cyanocobalamin  100 mcg Oral Daily Saltillo Noble, CRNP      docusate sodium  100 mg Oral BID PRN Keren Campbell PA-C      folic acid  1 mg Oral Daily Saltillo Noble, CRNP      hydrALAZINE  10 mg Intravenous Q6H PRN Rl Cohn MD      insulin lispro  1-6 Units Subcutaneous TID AC Saltillo Noble, CRNP      loratadine  10 mg Oral Daily Cheko Vazquez, CRNP      losartan  50 mg Oral Daily Alfie Adhikari      melatonin  3 mg Oral HS Palmira Clifton PA-C      metoprolol tartrate  25 mg Oral Q12H [facility] Cheko Noble, CRNP      pantoprazole  40 mg Oral Early Morning Cheko Vazquez, CRNP      potassium chloride  20 mEq Intravenous Once Keren Campbell PA-C 20 mEq (01/06/22 1105)    prochlorperazine  10 mg Oral Q12H PRN Cheko Vazquez, CRNP      sotalol  80 mg Oral BID Cheko Vazquez, CRNP      vancomycin  125 mg Oral Q12H [facility] Palmira Clifton PA-C      venlafaxine  37 5 mg Oral HS Cheko Vazquez, EDUARDO          Today, Patient Was Seen By: Keren Campbell PA-C    ** Please Note: Dictation voice to text software may have been used in the creation of this document   **

## 2022-01-06 NOTE — ASSESSMENT & PLAN NOTE
· New onset urinary retention, required CIC x 2; continue on protocol and place Bonner if needed    · Consider urology consult  · Ensure adequate bowel regimen

## 2022-01-06 NOTE — ASSESSMENT & PLAN NOTE
· Fluctuating mental status issues--neurology following, evaluated patient with them this morning    Unclear if this is toxic metabolic encephalopathy or hypertensive encephalopathy or other  · Check EEG  · Recheck COVID  · Consider LP??  · Recheck head CT  · Not compelling evidence this is all from UTI

## 2022-01-06 NOTE — ASSESSMENT & PLAN NOTE
· History of lung cancer, metastatic to bone- on chemotherapy being managed by Dr Leopoldo Prose    Patient may require a change in her regimen due to acute stroke

## 2022-01-06 NOTE — ASSESSMENT & PLAN NOTE
· Patient's initial reason for admission was multiple falls also with reports by her daughter of intermittent confusion  · CTH negative  MRI without gadolinium Right basal ganglia and right caudate acute ischemia  Repeat MRI of the brain with gadolinium and MRA imaging both noted to be unremarkable  · Continue Lipitor  · Neuro consult appreciated-- case discussed with them  · Echo unremarkable  · Patient with known h/o Afib no need for Tele  Continue Eliquis AC   Add aspirin per her oncologist Dr Giacomo Real for concern of thrombosis caused by chemotherapy  · PT/OT evals noted but would recommend re-evaluation  · No longer need permissive HTN

## 2022-01-06 NOTE — ASSESSMENT & PLAN NOTE
· Patient reported to have multiple falls (and reports of confusion and also with headaches/fatigue recently); abnormal MRI of the brain see above    Sustained mild contusions/buttock hematoma  · Head CT unremarkable  · PT/OT re-evaluation to determine appropriate discharge plan; trauma cleared patient

## 2022-01-06 NOTE — ASSESSMENT & PLAN NOTE
· Patient apparently has previous history of C diff and has had fairly persistent diarrhea since  Unclear if this was related to chemotherapy or if perhaps patient has not cleared her C diff infection; noted that he she actually had C diff positive PCR but not EIA in October  Therefore she should be on prophylactic vancomycin if she is requiring antibiotics for urinary tract infection  · Recommend rechecking for C diff now to see if she is still truly positive and requires additional treatment  Would not provide any p r n   Imodium until this result comes back - yet to be collected as she has not had any stool

## 2022-01-06 NOTE — OCCUPATIONAL THERAPY NOTE
Occupational Therapy Cancel Note     Patient Name: Lambert Monge  XPEMR'E Date: 1/6/2022  Problem List  Principal Problem:    Acute ischemic stroke [facility])  Active Problems:    Multiple falls    Lung cancer ([facility] [address] )    Chronic [facility] stage [facility] )    Hypertension    Diabetes type 2, controlled ([facility] )    Atrial fibrillation ([facility] )    Cystitis    Diarrhea    Ground glass opacity present on imaging of lung    Encephalopathy    Urinary retention    Confusion       01/06/22 1330   OT Last Visit   OT Visit Date 01/06/22  (Thursday)   Note Type   Note Type Cancelled Session   Cancel Reasons   (EEG; poorly responsive earlier this AM)   Assessment   Assessment Chart review completed  Care coordination w/ PT, Dia and Carmen VENEGAS  Spoke to [id number], [facility]  Attempted to see pt for OT tx session to assess progress and assist w/ DC planning  Per RN pt lethargic and poorly responsive earlier today and getting EEG   Will continue to follow as appropriate and schedule allows   [facility], OTR/L

## 2022-01-06 NOTE — ASSESSMENT & PLAN NOTE
· Abnormal UA with reported new onset urinary frequency per my colleague; of note patient denied all urinary symptoms to me during my evaluation yesterday  · UCx in process  · Given Cipro x 1 then transitioned to rocephin 1/5/22  · New leukocytosis noted during the stay, continue to monitor

## 2022-01-06 NOTE — ASSESSMENT & PLAN NOTE
· Abnormal CT with ground-glass opacities in bilateral lower lobes  Patient tested negative for COVID on admission and reported no respiratory complaints  · Given unusual complaints of fatigue and confusion worsening in the past 48 hours, would recommend repeat COVID test  · Son who visited yesterday developed symptoms of covid following the visit    Will await family's report about whether he tests positive

## 2022-01-06 NOTE — ASSESSMENT & PLAN NOTE
· permissive HTN in acute CVA initially but now can bring BP down given that she is significantly and persistently hypertensive

## 2022-01-06 NOTE — PROGRESS NOTES
Progress Note - Neurology   [facility] 66 y o  female MRN: [id number]  Unit/Bed#: S -01 Encounter: [id number]      Assessment/Plan     * Acute ischemic stroke [facility])  Assessment & Plan  66year old female with atrial fibrillation maintained on Eliquis, advanced stage lung CA on chemotherapy, CKD, history of uterine CA who initially presented to the hospital on 1/3/2022 as a level B trauma alert after multiple falls at home  Initial imaging demonstrated posttraumatic right buttocks hematoma, otherwise no acute bleeding or traumatic injury  Medicine team was asked to evaluate for altered mental status and frequent HA and MRI brain was performed which demonstrated right basal ganglia/caudate infarct/ischemia and neurology team was asked to further evaluate patient  MRI brain with contrast ordered to evaluate for underlying metastasis and this study was completed 1/5/2022 and was negative for enhancing lesion  Contacted this AM by medicine team to evaluate given increased lethargy and altered mental status  Per review of chart and discussion with medicine team, patient has had fluctuating mental status of unclear etiology  She is currently receiving antibiotics for possible UTI and also noted to have significantly elevated BP with SBP 190s-200s currently  Possible that altered mental status is secondary to metabolic encephalopathy in setting of elevated BP and also possible UTI  Cannot entirely exclude seizure, although no seizure-like activity noted and patient is answering some questions and able to follow some commands  Plan:   - MRI brain with contrast completed 1/5/2022 given history of lung cancer and no enhancing lesions noted  - MRA head and neck completed and no occlusive disease noted    - Echocardiogram completed, EF 60%, mild aortic regurgitation    - Continue on Eliquis 5 mg BID    - Continue atorvastatin 80 mg QPM   - Hemoglobin A1c reviewed, 5 8    - Fasting lipid panel reviewed, total cholesterol 210, triglycerides 252, HDL 41,    - Will check routine EEG    - Continue Telemetry monitoring   - PT/OT   - Stroke education    - Monitor exam and notify with changes  Multiple falls  Assessment & Plan  - Initially evaluated by Trauma Service  - Continue with supportive care   - PT/OT    Lung cancer [facility])  [address] [place] with [facility] hematology/oncology in the outpatient setting   - On chemotherapy regimen  Chronic kidney disease, stage 3 [facility])  Assessment & Plan  Lab Results   Component Value Date    EGFR 31 01/06/2022    EGFR 40 01/05/2022    EGFR 35 01/04/2022    CREATININE 1 59 (H) 01/06/2022    CREATININE 1 30 01/05/2022    CREATININE 1 45 (H) 01/04/2022     -  Trending creatinine/BUN / GFR, avoid nephrotoxic agents  Hypertension  Assessment & Plan  - Can begin to normalize BP today  - Management as per medicine team     Diabetes type 2, controlled [facility])  Assessment & Plan  Lab Results   Component Value Date    HGBA1C 5 8 (H) 01/05/2022       Recent Labs     01/05/22  1111 01/05/22  1646 01/05/22  2117 01/06/22  0719   POCGLU 145* 157* 219* 162*       Blood Sugar Average: Last 72 hrs:  (P) 153 625     - Goal euglycemia    - Management as per medicine team     Atrial fibrillation (HCC)  Assessment & Plan  - Continue on Eliquis 5 mg BID    - Rate control as per medicine team       Buttocks Hematoma  Assessment & Plan  - Outpatient follow-up with trauma team     Cystitis  Assessment & Plan  - Urine culture with >100,000 cfu/mL E  Coli  - Antibiotics as per medicine team     Diarrhea  Assessment & Plan  - Not acute issue per notes  - history of C diff, repeat PCR pending  1/6/2022 1015 Addendum: Reviewed case with attending neurologist and will repeat CTH without contrast now to ensure no acute intracranial abnormality  Also discussed with internal medicine team and recommend reach out to hematology oncology team and discuss anticoagulation   Patient had stroke while on therapeutic Eliquis dose and given history of malignancy, would recommend consider transition to therapeutic Lovenox  1/6/2022 1105 Addendum: Discussed with hematology oncology, Dr Magdaleno Burden, and they note that chemotherapy agent that patient is on can increase risk of thrombotic events and recommended addition of ASA 81 mg QD to current Eliquis 5 mg BID regimen rather than changing to therapeutic Lovenox  He notes that her chemotherapy agent will need to be changed in the outpatient setting given stroke and risk for thrombotic events  Christie Hightower will need follow up in in 6 weeks with neurovascular attending or advance practitioner  She will not require outpatient neurological testing  Subjective:   Unable to assess secondary to patient lethargy/mental status  Per discussion with medicine team, patient with fluctuating mentation and lethargy  Patient denies pain when asked but answered limited questions  ROS:  Unable to reliably assess secondary to mental status      Medications  Scheduled Meds:  Current Facility-Administered Medications   Medication Dose Route Frequency Provider Last Rate    acetaminophen  650 mg Oral Q6H PRN EDUARDO Willis      apixaban  5 mg Oral BID EDUARDO Willis      atorvastatin  80 mg Oral Daily With [employer]UREA      busPIRone  7 5 mg Oral BID EDUARDO Willis      cefTRIAXone  1,000 mg Intravenous Q24H EDUARDO Gallegos 1,000 mg (01/06/22 0544)   Nathan Richmond cholecalciferol  2,000 Units Oral Daily EDUARDO Willis      cyanocobalamin  100 mcg Oral Daily EDUARDO Willis      folic acid  1 mg Oral Daily EDUARDO Willis      furosemide  20 mg Oral Daily Palmira Clifton PA-C      hydrALAZINE  10 mg Intravenous Q6H PRN Bethanie Mercedes MD      insulin lispro  1-6 Units Subcutaneous TID AC EDUARDO Willis      loratadine  10 mg Oral Daily EDUARDO Willis      losartan  50 mg Oral Daily [facility]      metoprolol tartrate  25 mg Oral Q12H [address] [place] Nicki, CRNP      pantoprazole  40 mg Oral Early Morning [place] Dilliner, CRNP      prochlorperazine  10 mg Oral Q12H PRN [place] Nicki, CRNP      sotalol  80 mg Oral BID [place] Nicki, CRNP      vancomycin  125 mg Oral Q12H [address] Palmira Clifton PA-C      venlafaxine  37 5 mg Oral HS [place] Dilliner, CRNP       Continuous Infusions:   PRN Meds:   acetaminophen    hydrALAZINE    prochlorperazine    Vitals: Blood pressure (!) 204/83, pulse 92, temperature (!) 97 2 °F (36 2 °C), temperature source Axillary, resp  rate 16, height 5' 4" (1 626 m), weight 80 7 kg (178 lb), SpO2 94 %  ,Body mass index is 30 55 kg/m²  Physical Exam: BP (!) 195/75   Pulse 92   Temp (!) 97 2 °F (36 2 °C) (Axillary)   Resp 16   Ht 5' 4" (1 626 m)   Wt 80 7 kg (178 lb)   SpO2 94%   BMI 30 55 kg/m²   General appearance: Lethargic and opens eyes to noxious stimuli  Able to state her name and also name of hospital but unable to answer additional questions  Head: Normocephalic, without obvious abnormality, atraumatic  Eyes: negative findings: lids and lashes normal, conjunctivae and sclerae normal and pupils equal, round, reactive to light and accomodation  Lungs: clear to auscultation bilaterally  Heart: regular rate and rhythm  Abdomen: Soft, not distended  Extremities: extremities normal, warm and well-perfused; no cyanosis, clubbing, or edema  Skin: Skin color, texture, turgor normal  No rashes or lesions  Neurologic: Mental status: Lethargic, able to state name and location  Cranial nerves: II: pupils equal, round, reactive to light and accommodation, III,VII: ptosis no ptosis noted  Sensory: Unable to reliably assess secondary to mental status  Motor: Follows commands with LLE but unable to follow other commands  Purposeful movements noted all extremities and brisk flexion withdrawal noted to noxious stimuli       Labs  Recent Results (from the past 24 hour(s))   Echo complete w/ contrast if indicated    Collection Time: 01/05/22 10:40 AM   Result Value Ref Range    TV S' 0 7 cm/s    TV peak gradient 26 mmHg    Tricuspid valve peak regurgitation velocity 2 56 m/s    LVPWd 1 00 cm    MV E' Tissue Velocity Septal 7 cm/s    IVSd 0 90 cm    LV DIASTOLIC VOLUME (MOD BIPLANE) 2D 81 mL    LEFT VENTRICLE SYSTOLIC VOLUME (MOD BIPLANE) 2D 32 mL    Left ventricular stroke volume (2D) 49 00 mL    RVOT PMAX 2 mmHg    A4C EF 59 %    LVIDd 4 30 4 52 - 6 73 cm    LVIDS 2 90 2 1 - 4 0 cm    FS 33 28 - 44 %    Asc Ao 3 2 cm    Ao root 3 30 cm    RVID d 3 6 cm    AV LVOT peak gradient antegrade 2 mmHg    PV peak gradient antegrade 2 mmHg    E wave deceleration time 208 ms    RVOT peak eric 0 72 m/s    AV peak gradient antegrade 5 mmHg    MV Peak E Eric 67 cm/s    MV Peak A Eric 0 56 m/s    JAME A4C 19 1 cm2    RAA A4C 16 7 cm2    MV stenosis pressure 1/2 time 0 ms    ZLVIDS -2 48     LV EF 60    Fingerstick Glucose (POCT)    Collection Time: 01/05/22 11:11 AM   Result Value Ref Range    POC Glucose 145 (H) 65 - 140 mg/dl   Fingerstick Glucose (POCT)    Collection Time: 01/05/22  4:46 PM   Result Value Ref Range    POC Glucose 157 (H) 65 - 140 mg/dl   Fingerstick Glucose (POCT)    Collection Time: 01/05/22  9:17 PM   Result Value Ref Range    POC Glucose 219 (H) 65 - 140 mg/dl   Basic metabolic panel    Collection Time: 01/06/22  5:02 AM   Result Value Ref Range    Sodium 141 136 - 145 mmol/L    Potassium 3 4 (L) 3 5 - 5 3 mmol/L    Chloride 106 100 - 108 mmol/L    CO2 24 21 - 32 mmol/L    ANION GAP 11 4 - 13 mmol/L    BUN 16 5 - 25 mg/dL    Creatinine 1 59 (H) 0 60 - 1 30 mg/dL    Glucose 160 (H) 65 - 140 mg/dL    Calcium 9 1 8 3 - 10 1 mg/dL    eGFR 31 ml/min/1 73sq m   Fingerstick Glucose (POCT)    Collection Time: 01/06/22  7:19 AM   Result Value Ref Range    POC Glucose 162 (H) 65 - 140 mg/dl     Imaging   Personally reviewed images and reports in PACS  MRI brain with contrast- No enhancing lesions   MRA head without contrast- No occlusive disease  MRA carotids with and without contrast- No occlusive disease  VTE Prophylaxis: Apixaban    Counseling / Coordination of Care  Total time spent today 37 minutes  Greater than 50% of total time was spent with the patient and / or family counseling and / or coordination of care  A description of the counseling / coordination of care: Time spent discussing with medicine team, reviewing chart and imaging

## 2022-01-06 NOTE — ASSESSMENT & PLAN NOTE
Lab Results   Component Value Date    EGFR 31 01/06/2022    EGFR 40 01/05/2022    EGFR 35 01/04/2022    CREATININE 1 59 (H) 01/06/2022    CREATININE 1 30 01/05/2022    CREATININE 1 45 (H) 01/04/2022   Creatinine remains within her baseline  Avoid nephrotoxic agents  Noted she has not received any Lasix during the hospitalization and does have leg edema, gave 1 time Lasix at 20 mg

## 2022-01-07 ENCOUNTER — HOSPITAL ENCOUNTER (OUTPATIENT)
Dept: INFUSION CENTER | Facility: CLINIC | Age: 72
End: 2022-01-07

## 2022-01-07 LAB
ALBUMIN SERPL BCP-MCNC: 2.6 G/DL (ref 3.5–5)
ALP SERPL-CCNC: 97 U/L (ref 46–116)
ALT SERPL W P-5'-P-CCNC: 23 U/L (ref 12–78)
ANION GAP SERPL CALCULATED.3IONS-SCNC: 9 MMOL/L (ref 4–13)
AST SERPL W P-5'-P-CCNC: 28 U/L (ref 5–45)
BASOPHILS # BLD AUTO: 0.06 THOUSANDS/ΜL (ref 0–0.1)
BASOPHILS NFR BLD AUTO: 1 % (ref 0–1)
BILIRUB SERPL-MCNC: 0.61 MG/DL (ref 0.2–1)
BUN SERPL-MCNC: 15 MG/DL (ref 5–25)
CALCIUM ALBUM COR SERPL-MCNC: 10 MG/DL (ref 8.3–10.1)
CALCIUM SERPL-MCNC: 8.9 MG/DL (ref 8.3–10.1)
CHLORIDE SERPL-SCNC: 105 MMOL/L (ref 100–108)
CO2 SERPL-SCNC: 27 MMOL/L (ref 21–32)
CREAT SERPL-MCNC: 1.4 MG/DL (ref 0.6–1.3)
EOSINOPHIL # BLD AUTO: 0.02 THOUSAND/ΜL (ref 0–0.61)
EOSINOPHIL NFR BLD AUTO: 0 % (ref 0–6)
ERYTHROCYTE [DISTWIDTH] IN BLOOD BY AUTOMATED COUNT: 17.4 % (ref 11.6–15.1)
GFR SERPL CREATININE-BSD FRML MDRD: 37 ML/MIN/1.73SQ M
GLUCOSE SERPL-MCNC: 128 MG/DL (ref 65–140)
GLUCOSE SERPL-MCNC: 133 MG/DL (ref 65–140)
GLUCOSE SERPL-MCNC: 151 MG/DL (ref 65–140)
GLUCOSE SERPL-MCNC: 213 MG/DL (ref 65–140)
GLUCOSE SERPL-MCNC: 256 MG/DL (ref 65–140)
HCT VFR BLD AUTO: 36 % (ref 34.8–46.1)
HGB BLD-MCNC: 11.5 G/DL (ref 11.5–15.4)
IMM GRANULOCYTES # BLD AUTO: 0.09 THOUSAND/UL (ref 0–0.2)
IMM GRANULOCYTES NFR BLD AUTO: 1 % (ref 0–2)
LYMPHOCYTES # BLD AUTO: 0.9 THOUSANDS/ΜL (ref 0.6–4.47)
LYMPHOCYTES NFR BLD AUTO: 7 % (ref 14–44)
MCH RBC QN AUTO: 28.8 PG (ref 26.8–34.3)
MCHC RBC AUTO-ENTMCNC: 31.9 G/DL (ref 31.4–37.4)
MCV RBC AUTO: 90 FL (ref 82–98)
MONOCYTES # BLD AUTO: 1.13 THOUSAND/ΜL (ref 0.17–1.22)
MONOCYTES NFR BLD AUTO: 9 % (ref 4–12)
NEUTROPHILS # BLD AUTO: 10.31 THOUSANDS/ΜL (ref 1.85–7.62)
NEUTS SEG NFR BLD AUTO: 82 % (ref 43–75)
NRBC BLD AUTO-RTO: 0 /100 WBCS
PLATELET # BLD AUTO: 167 THOUSANDS/UL (ref 149–390)
PMV BLD AUTO: 11.4 FL (ref 8.9–12.7)
POTASSIUM SERPL-SCNC: 3.4 MMOL/L (ref 3.5–5.3)
PROT SERPL-MCNC: 5.6 G/DL (ref 6.4–8.2)
RBC # BLD AUTO: 3.99 MILLION/UL (ref 3.81–5.12)
SODIUM SERPL-SCNC: 141 MMOL/L (ref 136–145)
WBC # BLD AUTO: 12.51 THOUSAND/UL (ref 4.31–10.16)

## 2022-01-07 PROCEDURE — 97530 THERAPEUTIC ACTIVITIES: CPT

## 2022-01-07 PROCEDURE — 99232 SBSQ HOSP IP/OBS MODERATE 35: CPT | Performed by: INTERNAL MEDICINE

## 2022-01-07 PROCEDURE — 99232 SBSQ HOSP IP/OBS MODERATE 35: CPT | Performed by: PHYSICIAN ASSISTANT

## 2022-01-07 PROCEDURE — 80053 COMPREHEN METABOLIC PANEL: CPT | Performed by: PHYSICIAN ASSISTANT

## 2022-01-07 PROCEDURE — 82948 REAGENT STRIP/BLOOD GLUCOSE: CPT

## 2022-01-07 PROCEDURE — 97116 GAIT TRAINING THERAPY: CPT

## 2022-01-07 PROCEDURE — 97535 SELF CARE MNGMENT TRAINING: CPT

## 2022-01-07 PROCEDURE — 85025 COMPLETE CBC W/AUTO DIFF WBC: CPT | Performed by: PHYSICIAN ASSISTANT

## 2022-01-07 RX ORDER — POTASSIUM CHLORIDE 20 MEQ/1
40 TABLET, EXTENDED RELEASE ORAL ONCE
Status: COMPLETED | OUTPATIENT
Start: 2022-01-07 | End: 2022-01-07

## 2022-01-07 RX ORDER — CEPHALEXIN 500 MG/1
500 CAPSULE ORAL EVERY 6 HOURS SCHEDULED
Status: COMPLETED | OUTPATIENT
Start: 2022-01-08 | End: 2022-01-09

## 2022-01-07 RX ORDER — AMLODIPINE BESYLATE 5 MG/1
5 TABLET ORAL 2 TIMES DAILY
Status: DISCONTINUED | OUTPATIENT
Start: 2022-01-07 | End: 2022-01-11 | Stop reason: HOSPADM

## 2022-01-07 RX ADMIN — Medication 125 MG: at 08:02

## 2022-01-07 RX ADMIN — METOPROLOL TARTRATE 25 MG: 25 TABLET, FILM COATED ORAL at 21:55

## 2022-01-07 RX ADMIN — METOPROLOL TARTRATE 25 MG: 25 TABLET, FILM COATED ORAL at 08:00

## 2022-01-07 RX ADMIN — INSULIN LISPRO 3 UNITS: 100 INJECTION, SOLUTION INTRAVENOUS; SUBCUTANEOUS at 13:29

## 2022-01-07 RX ADMIN — INSULIN LISPRO 1 UNITS: 100 INJECTION, SOLUTION INTRAVENOUS; SUBCUTANEOUS at 08:06

## 2022-01-07 RX ADMIN — BUSPIRONE HYDROCHLORIDE 7.5 MG: 5 TABLET ORAL at 21:56

## 2022-01-07 RX ADMIN — APIXABAN 5 MG: 5 TABLET, FILM COATED ORAL at 17:48

## 2022-01-07 RX ADMIN — FOLIC ACID 1 MG: 1 TABLET ORAL at 08:01

## 2022-01-07 RX ADMIN — ASPIRIN 81 MG: 81 TABLET, COATED ORAL at 08:01

## 2022-01-07 RX ADMIN — ATORVASTATIN CALCIUM 80 MG: 40 TABLET, FILM COATED ORAL at 17:48

## 2022-01-07 RX ADMIN — VENLAFAXINE HYDROCHLORIDE 37.5 MG: 37.5 CAPSULE, EXTENDED RELEASE ORAL at 21:55

## 2022-01-07 RX ADMIN — POTASSIUM CHLORIDE 40 MEQ: 1500 TABLET, EXTENDED RELEASE ORAL at 10:40

## 2022-01-07 RX ADMIN — VITAM B12 100 MCG: 100 TAB at 08:01

## 2022-01-07 RX ADMIN — SOTALOL HYDROCHLORIDE 80 MG: 80 TABLET ORAL at 08:04

## 2022-01-07 RX ADMIN — LORATADINE 10 MG: 10 TABLET ORAL at 08:01

## 2022-01-07 RX ADMIN — SOTALOL HYDROCHLORIDE 80 MG: 80 TABLET ORAL at 17:48

## 2022-01-07 RX ADMIN — LOSARTAN POTASSIUM 50 MG: 50 TABLET, FILM COATED ORAL at 08:00

## 2022-01-07 RX ADMIN — Medication 125 MG: at 21:56

## 2022-01-07 RX ADMIN — CEFTRIAXONE SODIUM 1000 MG: 10 INJECTION, POWDER, FOR SOLUTION INTRAVENOUS at 05:38

## 2022-01-07 RX ADMIN — APIXABAN 5 MG: 5 TABLET, FILM COATED ORAL at 08:01

## 2022-01-07 RX ADMIN — Medication 3 MG: at 21:55

## 2022-01-07 RX ADMIN — PANTOPRAZOLE SODIUM 40 MG: 40 TABLET, DELAYED RELEASE ORAL at 05:37

## 2022-01-07 RX ADMIN — Medication 2000 UNITS: at 08:01

## 2022-01-07 RX ADMIN — AMLODIPINE BESYLATE 5 MG: 5 TABLET ORAL at 08:01

## 2022-01-07 RX ADMIN — AMLODIPINE BESYLATE 5 MG: 5 TABLET ORAL at 21:56

## 2022-01-07 RX ADMIN — BUSPIRONE HYDROCHLORIDE 7.5 MG: 5 TABLET ORAL at 08:01

## 2022-01-07 NOTE — ASSESSMENT & PLAN NOTE
· permissive HTN in acute CVA initially but now can normalize BP down given that she is significantly and persistently hypertensive--home meds were already resumed    Will increase Norvasc to 5 mg b i d  with hold parameters in place

## 2022-01-07 NOTE — PROGRESS NOTES
[facility]  Progress Note Federico Colindres 1950, 66 y o  female MRN: [id number]  Unit/Bed#: S -01 Encounter: [id number]  Primary Care Provider: Davis Conti DO   Date and time admitted to hospital: 1/3/2022  7:30 PM    * Acute ischemic stroke [facility])  Assessment & Plan  · Patient's initial reason for admission was multiple falls also with reports by her daughter of intermittent confusion  · CTH negative  MRI without gadolinium Right basal ganglia and right caudate acute ischemia  Repeat MRI of the brain with gadolinium and MRA imaging both noted to be unremarkable  · Continue Lipitor  · Neuro consult appreciated-- case discussed with them  · Echo unremarkable  · Patient with known h/o Afib no need for Tele  Continue Eliquis AC  Added aspirin per her oncologist Dr Juan Ray for concern of thrombosis caused by chemotherapy  · PT/OT evals noted recommending rehab  · No longer need permissive HTN    Encephalopathy  Assessment & Plan  · Fluctuating mental status issues--neurology follow-up appreciated  Unclear if this is truly related to new stroke verses toxic metabolic encephalopathy or hypertensive encephalopathy or other  · EEG with nonspecific slowing  · COVID negative x2  · No evidence of bleeding or change on repeat head CT  · Not compelling evidence this is all from UTI  · Encourage oral intake    Cystitis  Assessment & Plan  · Abnormal UA with reported new onset urinary frequency per my colleague; of note patient denied all urinary symptoms to me during my evaluation yesterday  · UCx reviewed  · Given Cipro x 1 then transitioned to rocephin 1/5/22--received 3 doses of Rocephin, reviewed urine culture    Will change to Keflex for 2 more days to complete 5 day course  · New leukocytosis noted during the stay, continue to monitor      Multiple falls  Assessment & Plan  · Patient reported to have multiple falls (and reports of confusion and also with headaches/fatigue recently); abnormal MRI of the brain see above  Sustained mild contusions/buttock hematoma  · Head CT unremarkable  · PT/OT re-evaluation appreciated, now being recommended for rehab instead of home with VNA; trauma cleared patient    Urinary retention  Assessment & Plan  · New onset urinary retention, required CIC x 4;  place Bonner today if another event of urinary retention occurs today  · Voiding trial in approximately 1 week  · Outpatient urology consult  · Ensure adequate bowel regimen    [facility]  Assessment & Plan  · Continue home meds including sotalol, metoprolol and Eliquis    Hypertension  Assessment & Plan  · permissive HTN in acute CVA initially but now can normalize BP down given that she is significantly and persistently hypertensive--home meds were already resumed  Will increase Norvasc to 5 mg b i d  with hold parameters in place      Chronic kidney disease, stage 3 [facility])  Assessment & Plan  Lab Results   Component Value Date    EGFR 37 01/07/2022    EGFR 31 01/06/2022    EGFR 40 01/05/2022    CREATININE 1 40 (H) 01/07/2022    CREATININE 1 59 (H) 01/06/2022    CREATININE 1 30 01/05/2022   Creatinine remains within her baseline  Avoid nephrotoxic agents  Noted she has not received any Lasix during the hospitalization and had mild leg edema, gave 1 time Lasix at 20 mg --holding further doses based on low oral intake    Lung cancer ([facility] )  Assessment & Plan  · History of lung cancer, metastatic to bone- on chemotherapy being managed by Dr London Gonzalez  Patient may require a change in her regimen due to acute stroke  · Made daughter aware that no chemo can be given while at rehab    Ground glass opacity present on imaging of lung  Assessment & Plan  · Abnormal CT with ground-glass opacities in bilateral lower lobes  Patient tested negative for COVID on admission and reported no respiratory complaints      · Given unusual complaints of fatigue and confusion worsening in the past 48 hours, we performed a repeat COVID test which was also negative  · Son who visited yesterday developed symptoms of covid following the visit  Will await family's report about whether he tests positive      Diarrhea  Assessment & Plan  · Patient apparently has previous history of C diff and has had fairly persistent diarrhea since  Unclear if this was related to chemotherapy or if perhaps patient has not cleared her C diff infection; noted that he she actually had C diff positive PCR but not EIA in October  Therefore she should be on prophylactic vancomycin if she is requiring antibiotics for urinary tract infection  · Recommend rechecking for C diff now to see if she is still truly positive and requires additional treatment  However since the time that the sample was requested, patient has ceased to have any additional events of diarrhea and therefore no sample could be sent    Hypokalemia  Assessment & Plan  Potassium   Date Value Ref Range Status   01/07/2022 3 4 (L) 3 5 - 5 3 mmol/L Final   01/06/2022 3 4 (L) 3 5 - 5 3 mmol/L Final   01/05/2022 4 1 3 5 - 5 3 mmol/L Final     · Replete again and monitor    Diabetes type 2, controlled [facility])  Assessment & Plan  Lab Results   Component Value Date    HGBA1C 5 8 (H) 01/05/2022       Recent Labs     01/06/22  1557 01/06/22  2105 01/07/22  0738 01/07/22  1057   POCGLU 188* 245* 151* 256*       Blood Sugar Average: Last 72 hrs:  (P) 242 [id number] due for updated A1c  Monitor on Accu-Cheks  Diabetic diet    VTE Pharmacologic Prophylaxis:   Pharmacologic: Apixaban (Eliquis)  Mechanical VTE Prophylaxis in Place: No    Patient Centered Rounds: I have performed bedside rounds with nursing staff today  Discussions with Specialists or Other Care Team Provider: case jhonny, physical therapy, my attending on-call from last night regarding her blood pressure, neurology    Education and Discussions with Family / Patient:  Called daughter with an update    Time Spent for Care: 30 minutes  More than 50% of total time spent on counseling and coordination of care as described above  Current Length of Stay: 3 day(s)    Current Patient Status: Inpatient   Certification Statement: The patient will continue to require additional inpatient hospital stay due to Placement    Discharge Plan:  Patient requires inpatient rehab, case management working on placement, likely acute rehab    Code Status: Level 3 - DNAR and DNI    Subjective:   Patient not having any diarrhea and in fact has not had any bowel movements whatsoever for several days  No fever, chills, nausea, vomiting  She has continue to require clean intermittent catheterization, the nurse just did the 4th one for urinary retention but has not yet placed a Bonner catheter  Patient continues to be drowsy but slightly improved according to nursing  The patient herself offers very little history to me but denies any issues with depression, any pain, or other events or concerns  Her daughter over the phone admits that her oral intake has certainly been less than stellar, which nursing confirms as well    Objective:     Vitals:   Temp (24hrs), Av 2 °F (36 8 °C), Min:97 5 °F (36 4 °C), Max:99 3 °F (37 4 °C)    Temp:  [97 5 °F (36 4 °C)-99 3 °F (37 4 °C)] 97 5 °F (36 4 °C)  HR:  [56-68] 68  Resp:  [16-18] 18  BP: (125-197)/(64-84) 125/67  SpO2:  [90 %-96 %] 94 %  Body mass index is 30 55 kg/m²  Input and Output Summary (last 24 hours): Intake/Output Summary (Last 24 hours) at 2022 1201  Last data filed at 2022 0800  Gross per 24 hour   Intake 120 ml   Output 1175 ml   Net -1055 ml       Physical Exam:     Physical Exam  Vitals reviewed  Constitutional:       General: She is not in acute distress  Appearance: She is ill-appearing  She is not toxic-appearing or diaphoretic        Comments: Patient seen sitting in bedside chair but leaning to the last and drowsy, dozing off, also noted that she was wearing 2 pairs of eyeglasses 1 on top of the other  Appears older than stated age   Eyes:      General:         Right eye: No discharge  Left eye: No discharge  Conjunctiva/sclera: Conjunctivae normal    Cardiovascular:      Rate and Rhythm: Normal rate and regular rhythm  Heart sounds: No murmur heard  Pulmonary:      Effort: No respiratory distress  Breath sounds: No stridor  No wheezing or rhonchi  Abdominal:      General: There is no distension  Tenderness: There is no guarding  Musculoskeletal:      Right lower leg: No edema  Left lower leg: No edema  Skin:     General: Skin is warm and dry  Coloration: Skin is not jaundiced or pale  Findings: No bruising, erythema, lesion or rash  Neurological:      Comments: Slightly improved compared to yesterday but still drowsy  No dysarthria or facial asymmetry         Additional Data:     Labs:    Results from last 7 days   Lab Units 01/07/22  0542   WBC Thousand/uL 12 51*   HEMOGLOBIN g/dL 11 5   HEMATOCRIT % 36 0   PLATELETS Thousands/uL 167   NEUTROS PCT % 82*   LYMPHS PCT % 7*   MONOS PCT % 9   EOS PCT % 0     Results from last 7 days   Lab Units 01/07/22  0542   SODIUM mmol/L 141   POTASSIUM mmol/L 3 4*   CHLORIDE mmol/L 105   CO2 mmol/L 27   BUN mg/dL 15   CREATININE mg/dL 1 40*   ANION GAP mmol/L 9   CALCIUM mg/dL 8 9   ALBUMIN g/dL 2 6*   TOTAL BILIRUBIN mg/dL 0 61   ALK PHOS U/L 97   ALT U/L 23   AST U/L 28   GLUCOSE RANDOM mg/dL 133     Results from last 7 days   Lab Units 01/03/22  1940   INR  1 13     Results from last 7 days   Lab Units 01/07/22  1057 01/07/22  0738 01/06/22  2105 01/06/22  1557 01/06/22  1109 01/06/22  0719 01/05/22  2117 01/05/22  1646 01/05/22  1111 01/05/22  0728 01/04/22  1620 01/04/22  1150   POC GLUCOSE mg/dl 256* 151* 245* 188* 202* 162* 219* 157* 145* 162* 145* 142*     Results from last 7 days   Lab Units 01/05/22  0644   HEMOGLOBIN A1C % 5 8*           * I Have Reviewed All Lab Data Listed Above    * Additional Pertinent Lab Tests Reviewed: Maximoingmichelle 64 Admission Reviewed    Imaging:    Imaging Reports Reviewed Today Include:   Imaging Personally Reviewed by Myself Includes:      Recent Cultures (last 7 days):     Results from last 7 days   Lab Units 01/04/22  1449   URINE CULTURE  >100,000 cfu/ml Escherichia coli*       Last 24 Hours Medication List:   Current Facility-Administered Medications   Medication Dose Route Frequency Provider Last Rate    acetaminophen  650 mg Oral Q6H PRN Lindalee Becerra, CRNP      amLODIPine  5 mg Oral BID Palmira StoAUREA saunders      apixaban  5 mg Oral BID Lindalee Becerra, EDUARDO      aspirin  81 mg Oral Daily [facility] oro      atorvastatin  80 mg Oral Daily With [employer]      busPIRone  7 5 mg Oral BID Lindalee Becerra, EDUARDO      [START ON 1/8/2022] cephalexin  500 mg Oral Q6H [facility] saunders      cholecalciferol  2,000 Units Oral Daily Lindalee Becerra, EDUARDO      cyanocobalamin  100 mcg Oral Daily Lindalee Becerra, EDUARDO      docusate sodium  100 mg Oral BID PRN Pb Harvey PA-C      folic acid  1 mg Oral Daily Lindalee Becerra, EDUARDO      hydrALAZINE  10 mg Intravenous Q6H PRN Li Robbins MD      insulin lispro  1-6 Units Subcutaneous TID AC Lindalee Becerra, CRNP      loratadine  10 mg Oral Daily Lindalee Becerra, CRNP      losartan  50 mg Oral Daily Luke Eldridge MD      melatonin  3 mg Oral HS [facility] oro      metoprolol tartrate  25 mg Oral Q12H [facility] Lindalee Becerra, CRNP      pantoprazole  40 mg Oral Early Morning Lindalee Becerra, CRGERALDO      prochlorperazine  10 mg Oral Q12H PRN Lindalee Becerra, CRGERALDO      sotalol  80 mg Oral BID Lindalee Becerra, EDUARDO      vancomycin  125 mg Oral Q12H [facility] saunders      venlafaxine  37 5 mg Oral HS Lindalee Becerra, EDUARDO          Today, Patient Was Seen By: Pb Harvey PA-C    ** Please Note: Dictation voice to text software may have been used in the creation of this document  **

## 2022-01-07 NOTE — ASSESSMENT & PLAN NOTE
Lab Results   Component Value Date    HGBA1C 5 8 (H) 01/05/2022       Recent Labs     01/06/22  1557 01/06/22  2105 01/07/22  0738 01/07/22  1057   POCGLU 188* 245* 151* 256*       Blood Sugar Average: Last 72 hrs:  (P) 440 [id number] due for updated A1c  Monitor on Accu-Cheks  Diabetic diet

## 2022-01-07 NOTE — ASSESSMENT & PLAN NOTE
· History of lung cancer, metastatic to bone- on chemotherapy being managed by Dr Herbie Maxwell    Patient may require a change in her regimen due to acute stroke  · Made daughter aware that no chemo can be given while at rehab

## 2022-01-07 NOTE — ASSESSMENT & PLAN NOTE
· New onset urinary retention, required CIC x 4;  place Bonner today if another event of urinary retention occurs today  · Voiding trial in approximately 1 week  · Outpatient urology consult  · Ensure adequate bowel regimen

## 2022-01-07 NOTE — OCCUPATIONAL THERAPY NOTE
Occupational Therapy Progress Note     Patient Name: Jorge L MASTERSON Date: 1/7/2022  Problem List  Principal Problem:    Acute ischemic stroke [facility])  Active Problems:    Multiple falls    Lung cancer ([facility] )    Chronic kidney disease, stage 3 ([facility] )    Hypertension    Diabetes type 2, controlled ([facility] )    Atrial fibrillation ([facility] )    Cystitis    Diarrhea    Ground glass opacity present on imaging of lung    Encephalopathy    Urinary retention    Confusion       01/07/22 0927   OT Last Visit   OT Visit Date 01/07/22  (Friday)   Note Type   Note Type Treatment   Restrictions/Precautions   Weight Bearing Precautions Per Order No   Other Precautions Cognitive; Chair Alarm; Bed Alarm;Telemetry; Fall Risk;Pain   General   Response to Previous Treatment Patient reporting fatigue but able to participate   Family/Caregiver Present No  Pt reports that she has not spoken w/ her daughter or brother   Pain Assessment   Pain Assessment Tool 0-10   Pain Score 10 - Worst Possible Pain   Pain Location/Orientation Location: Groin  (w/ movement)   Effect of Pain on Daily Activities limits activity tolerance and I w/ ADLs   Hospital Pain Intervention(s) Repositioned; Ambulation/increased activity; Emotional support   ADL   Where Assessed Edge of bed  (vs OOB in chair)   Eating Assistance 6  Modified independent   Eating Deficit Setup   Eating Comments seated OOB in chair w/ wedge pillow and pillow for L trunk/UE support  Able to feed self and cut [place] toast   Grooming Assistance 5  Supervision/Setup   Grooming Deficit Setup;Supervision/safety; Increased time to complete   Grooming Comments seated OOB in chair at tray table using mirror after set- up  + time   UB Bathing Assistance 4  Minimal Assistance   UB Bathing Deficit Setup;Steadying;Supervision/safety; Increased time to complete   UB Bathing Comments seated OOB in chair   LB Bathing Assistance Unable to assess   UB Dressing Assistance 4  Minimal Assistance   UB Dressing Deficit Setup;Steadying;Verbal cueing;Supervision/safety; Increased time to complete;Pull around back   UB Dressing Comments + time and assist to maintain balance   LB Dressing Assistance   (mod <> max A)   LB Dressing Deficit Setup;Steadying; Requires assistive device for steadying;Verbal cueing;Supervision/safety; Increased time to complete   LB Dressing Comments Required max A to don socks seated unsupported at EOB  Progressed to mod A to don pants seated OOB in chair  Able to manage  standing w/ min A to maintain static balance w/ + time  Toileting Assistance  Unable to assess   Toileting Comments Pt declined need to void  Recommend use of commode at this time   Bed Mobility   Supine to Sit 3  Moderate assistance   Additional items Assist x 1; Increased time required;HOB elevated; Bedrails;Verbal cues;LE management   Sit to Supine Unable to assess   Additional Comments Pt seated OOB in chair post eval w/ needs met, call bell in reach and chair alarm activated eating breakfast   Transfers   Sit to Stand 3  Moderate assistance  (mod A from EOB --> min A from recliner chair)   Additional items Assist x 1;Bedrails;Armrests; Increased time required;Verbal cues  (instruction for hand placement)   Stand to Sit 4  Minimal assistance   Additional items Assist x 1; Armrests; Bedrails; Increased time required;Verbal cues   Additional Comments Pt required increased physical assistance to maintain balance seated at EOB since eval   Functional Mobility   Functional Mobility 4  Minimal assistance   Additional Comments min A using RW few feet straight forward from EOB --> mod A to turn and complete approach to chair   Engaged in functional mobility approx 4-5'   Additional items Rolling walker   Therapeutic Excerise-Strength   UE Strength   (B UE strength grossly 3+/5)   Coordination   Gross Motor able to manage  and use utensils bilaterally to cut [place] toast / feed self   Cognition   Overall Cognitive Status Impaired Arousal/Participation Responsive;Arousable; Cooperative;Lethargic   Attention Attends with cues to redirect   Orientation Level Oriented to person;Oriented to place; Disoriented to time  (aware in hospital and fell at home)   Memory Decreased short term memory;Decreased recall of recent events   Following Commands Follows one step commands with increased time or repetition   Comments Identified pt by full name and birthdate  Upon arrival, lethargic and difficulty sustaining attention and follow directions  Consistent stimuli to stay awake  Improved arousal and participation w/ increased activity upon sitting at EOB  Oriented to person, place, and aware in hospital, fell at home  Disoriented to time  Appropriate long term recall when talking about her family and work history  Pt added that she has 2 grandchildren (12 and 15)  Pt reports that her grandson is having a difficult time coping w/ pt's cancer diagnosis  Vision   Vision Comments Recommend on going eval of functional vision  Able to read clock  Pt reports not having bifocals here and initially put on 2 pairs of glasses  Activity Tolerance   Activity Tolerance Patient limited by fatigue;Patient limited by pain   Medical Staff Made Aware per RNShakira appropriate to see pt  Spoke to PTDia and Herbert CORBIN   Assessment   Assessment Chart review completed and pt seen for OT tx session to assess progress, improve engagement and assist w/ DC planning  MRI impression R basa; ganglia and R caudate acute ischemia and neurology consulted for altered mental status  Trauma signed off and transferred care to [facility] service  Pt lethargic upon arrival and required consistent stimuli to sustain arousal  Agreeable to participate in session and arousal, active participation improved upon sitting at EOB  Pt required increased physical assistance to complete bed mobility supine to sit   Pt required increased physical assistance to stand from EOB w/ mod A and progressed to min A from recliner chair  Pt required mod --> max A to complete LBD and demonstrated poor sitting balance unsupported at EOB w/ L trunk lean  Pt required min A <> mod A for short distance functional mobility using RW  Pt seated OOB in chair at end of session w/ needs met, call bell in reach eating breakfast  Able to feed self and mange cutting using utensils bilaterally  From an OT perspective, recommend post acute rehab at this time when medically stable for discharge from acute care as pt required increased assistance to complete ADLs  Will continue to follow   Plan   Treatment Interventions ADL retraining;Functional transfer training;UE strengthening/ROM; Endurance training;Patient/family training;Cognitive reorientation;Equipment evaluation/education; Fine motor coordination activities; Compensatory technique education; Neuromuscular reeducation;Continued evaluation; Energy conservation; Activityengagement   Goal Expiration Date 01/14/22   OT Treatment Day 1  (Friday)   OT Frequency 3-5x/wk   Recommendation   OT Discharge Recommendation Post acute rehabilitation services   Equipment Recommended Bedside commode; Shower/Tub chair with back ($);Other (comment)  (Will continue to assess)   Commode Type Standard   Additional Comments  use of RW for functional mobility at this time   AM-PAC Daily Activity Inpatient   Lower Body Dressing 2   Bathing 2   Toileting 2   Upper Body Dressing 3   Grooming 3   Eating 4   Daily Activity Raw Score 16   Daily Activity Standardized Score (Calc for Raw Score >=11) 35 96   AM-PAC Applied Cognition Inpatient   Following a Speech/Presentation 2   Understanding Ordinary Conversation 3   Taking Medications 2   Remembering Where Things Are Placed or Put Away 3   Remembering List of 4-5 Errands 2   Taking Care of Complicated Tasks 2   Applied Cognition Raw Score 14   Applied Cognition Standardized Score 32 02   Barthel Index   Feeding 10   Bathing 0   Grooming Score 0   Dressing Score 5 Bladder Score 5   Bowels Score 5   Toilet Use Score 5   Transfers (Bed/Chair) Score 10   Mobility (Level Surface) Score 0   Stairs Score 0   Barthel Index Score 40   Modified [place] Scale   Modified Patricia Scale 4        The patient's raw score on the AM-PAC Daily Activity inpatient short form is 16, standardized score is 35 96, less than 39 4  Patients at this level are likely to benefit from discharge to post-acute rehabilitation services  Please refer to the recommendation of the Occupational Therapist for safe discharge planning    Pt goals to be met by 1/14/22:  -Pt will demonstrate good attention and participation in continued evaluation to assess functional cognitive skills to assist in DC planning     -Pt will consistently follow multi - step directions during ADLs w/ no more than 1 cue / prompt to max I w/ ADLs and return home     -Pt will complete bed mobility supine <> sit w/ mod I to max I w/ ADLs and return home     -Pt will complete UBD w/ mod I after set- up     -Pt will complete LBD w/ S using LHAE as needed to max I w/ ADLs and return home     -Pt will demonstrate improved functional standing tolerance for at least 10 minutes using AD as needed w/ at least fair + balance to max I w/ food prep and functional mobility to return home     -Pt will consistently engage in functional mobility household distances using AD w/ S to max I w/ ADLs and return home     -Pt will demonstrate good attention and understanding EC tech to max I and improve engagement to return home w/ family       Ijeoma Anthony, OTR/L

## 2022-01-07 NOTE — ASSESSMENT & PLAN NOTE
Potassium   Date Value Ref Range Status   01/07/2022 3 4 (L) 3 5 - 5 3 mmol/L Final   01/06/2022 3 4 (L) 3 5 - 5 3 mmol/L Final   01/05/2022 4 1 3 5 - 5 3 mmol/L Final     · Replete again and monitor

## 2022-01-07 NOTE — CASE MANAGEMENT
Case Management Progress Note    Patient name Abelino [facility] /S -01 MRN [id number]  : 1950 Date 2022       LOS (days): 3  Geometric Mean LOS (GMLOS) (days): 4 40  Days to GMLOS:1 5        OBJECTIVE:        Current admission status: Inpatient  Preferred Pharmacy: No Pharmacies Listed  Primary Care Provider: Clark Kearney DO    Primary Insurance: MEDICARE  Secondary Insurance: AARP    PROGRESS NOTE:    Cm spoke with patient's daughter and patient's brother via phone to discuss dc recommendations  Cm reviewed in detail that different levels of rehab  Daughter in agreement with referral to [facility] for review  CM also emailed daughter list of acute rehabs and subacute rehabs  Daughter in agreement with blanket referrals

## 2022-01-07 NOTE — ASSESSMENT & PLAN NOTE
· Patient reported to have multiple falls (and reports of confusion and also with headaches/fatigue recently); abnormal MRI of the brain see above    Sustained mild contusions/buttock hematoma  · Head CT unremarkable  · PT/OT re-evaluation appreciated, now being recommended for rehab instead of home with VNA; trauma cleared patient

## 2022-01-07 NOTE — ASSESSMENT & PLAN NOTE
· Patient apparently has previous history of C diff and has had fairly persistent diarrhea since  Unclear if this was related to chemotherapy or if perhaps patient has not cleared her C diff infection; noted that he she actually had C diff positive PCR but not EIA in October  Therefore she should be on prophylactic vancomycin if she is requiring antibiotics for urinary tract infection  · Recommend rechecking for C diff now to see if she is still truly positive and requires additional treatment    However since the time that the sample was requested, patient has ceased to have any additional events of diarrhea and therefore no sample could be sent

## 2022-01-07 NOTE — ASSESSMENT & PLAN NOTE
Lab Results   Component Value Date    EGFR 37 01/07/2022    EGFR 31 01/06/2022    EGFR 40 01/05/2022    CREATININE 1 40 (H) 01/07/2022    CREATININE 1 59 (H) 01/06/2022    CREATININE 1 30 01/05/2022   Creatinine remains within her baseline  Avoid nephrotoxic agents  Noted she has not received any Lasix during the hospitalization and had mild leg edema, gave 1 time Lasix at 20 mg --holding further doses based on low oral intake

## 2022-01-07 NOTE — PHYSICAL THERAPY NOTE
PHYSICAL THERAPY TREATMENT NOTE    Patient Name: Jorge L GOMESCER'S Date: 1/7/2022 01/07/22 1225   PT Last Visit   PT Visit Date 01/07/22   Note Type   Note Type Treatment   Pain Assessment   Pain Assessment Tool 0-10   Pain Score No Pain   Restrictions/Precautions   Weight Bearing Precautions Per Order No   Other Precautions Cognitive; Chair Alarm; Bed Alarm;Telemetry; Fall Risk   General   Chart Reviewed Yes   Additional Pertinent History Pt w/ MRI of brain on 1/4/22: "Right basal ganglia and right caudate acute ischemia "   Family/Caregiver Present No   Cognition   Overall Cognitive Status Impaired   Arousal/Participation Alert; Cooperative   Attention Attends with cues to redirect   Orientation Level Oriented to person;Oriented to place; Disoriented to time   Memory Decreased short term memory;Decreased recall of recent events   Following Commands Follows one step commands with increased time or repetition   Comments Pt alert upon arrival, had pushed tray table away from chair and was asking to get OOB  Pt able to participate in conversation, but tangential and easily distracted at times, benefits from (+) time and repetition for directions   Subjective   Subjective "I have a devan   if you're looking for pierogies"   Bed Mobility   Rolling L 4  Minimal assistance   Additional items Assist x 1;Bedrails; Increased time required;Verbal cues   Supine to Sit Unable to assess   Sit to Supine 3  Moderate assistance   Additional items Assist x 1; Increased time required;Verbal cues;LE management   Additional Comments Pt OOB in recliner chair at start of session, returned to supine in bed at end of session   Transfers   Sit to Stand 3  Moderate assistance   Additional items Assist x 1; Increased time required;Verbal cues   Stand to Sit 4  Minimal assistance   Additional items Assist x 1; Increased time required;Verbal cues   Additional Comments When sitting unsupported, pt w/ L sided lean, requires min A x 1 to maintain midline  Pt w/ worsening balance when trying to dual task (sit up and blow her nose)   Ambulation/Elevation   Gait pattern Decreased foot clearance;L Knee Rand; Short stride; Excessively slow   Gait Assistance 3  Moderate assist   Additional items Assist x 1;Verbal cues   Assistive Device Rolling walker   Distance 5 ft   Balance   Static Sitting Fair -   Dynamic Sitting Poor +   Static Standing Poor +   Ambulatory Poor   Activity Tolerance   Activity Tolerance Patient limited by fatigue   Medical Staff Made Aware Spoke to OT Prisca, [address] to RU[place]   Assessment   Prognosis Fair   Problem List Decreased strength;Decreased endurance; Impaired balance;Decreased mobility; Decreased cognition;Decreased safety awareness; Obesity   Assessment Pt seen for PT intervention, since PT evaluation, pt w/ dx of acute ischemic CVA  Pt w/ waxing and waning alertness per RN  Pt appears more confused today than previous session, overall she requires increased level of A to perform all functional mobility  She also has more limited ambulation distance secondary to fatigue and reported fear of falling due to L knee buckling  Goals continue to be appropriate for pt at this time, however discharge recommendation is now for post-acute inpatient rehab as pt is demonstrating significant decline in functional mobility from her baseline and would benefit in order to maximize independence and decrease risk for falls    Goals   Patient Goals to get back in bed   STG Expiration Date 01/17/22   Short Term Goal #1 Patient will:  Increase bilateral LE strength 1/2 grade to facilitate independent mobility, Perform all bed mobility tasks modified independent to decrease fall risk factors, Perform all transfers modified independent to improve independence, Ambulate at least 150 ft  +/- LRAD modified independent w/o LOB, Increase all balance 1/2 grade to decrease risk for falls, Complete exercise program independently and Tolerate 3 hr OOB to faciliate upright tolerance   PT Treatment Day 0   Plan   Treatment/Interventions Functional transfer training;LE strengthening/ROM; Therapeutic exercise; Endurance training;Cognitive reorientation;Patient/family training;Equipment eval/education; Bed mobility;Gait training   PT Frequency   (4-5x/wk)   Recommendation   PT Discharge Recommendation Post acute rehabilitation services   Equipment Recommended [facility] Recommended Wheeled walker   Change/add to [employer]? No   AM-PAC Basic Mobility Inpatient   Turning in Bed Without Bedrails 3   Lying on Back to Sitting on Edge of Flat Bed 2   Moving Bed to Chair 2   Standing Up From Chair 2   Walk in Room 2   Climb 3-5 Stairs 2   Basic Mobility Inpatient Raw Score 13   Basic Mobility Standardized Score 33 99   Highest Level Of Mobility   -[facility] Goal 4: Move to chair/commode   JH-HL Highest Level of Mobility 6: Walk 10 steps or more   JH-HLM Goal Achieved Yes   End of Consult   Patient Position at End of Consult All needs within reach;Bed/Chair alarm activated;Supine     The patient's AM-PAC Basic Mobility Inpatient Short Form Raw Score is 13  A Raw score of less than or equal to 16 suggests the patient may benefit from discharge to post-acute rehabilitation services  Please also refer to the recommendation of the Physical Therapist for safe discharge planning  Pt would continue to benefit from skilled PT during this admission in order to progress patient towards goals to decrease risk of falls and maximize independence       Gwendolyn Butler, PT, DPT

## 2022-01-07 NOTE — ASSESSMENT & PLAN NOTE
· Fluctuating mental status issues--neurology follow-up appreciated    Unclear if this is truly related to new stroke verses toxic metabolic encephalopathy or hypertensive encephalopathy or other  · EEG with nonspecific slowing  · COVID negative x2  · No evidence of bleeding or change on repeat head CT  · Not compelling evidence this is all from UTI  · Encourage oral intake

## 2022-01-07 NOTE — PROGRESS NOTES
Progress Note - Geriatric Medicine   Hardeep Reinoso 66 y o  female MRN: [id number]  Unit/Bed#: S -01 Encounter: [id number]      Assessment/Plan:  1 -acute ischemic stroke  -CT without contrast revealed evolving right basal ganglia infarct no hemorrhage or significant mass effect noted  Patient is somnolent today daughter states that she did wake up for her  Encephalopathy most likely secondary to her ischemic stroke  2 -hypertension -blood pressure well controlled with amlodipine 5 mg orally twice a day, losartan 50 mg orally daily, Lopressor 25 mg every 12 hours  3 -chronic renal failure stage IIIB  -patient GFR at 37    4 -diarrhea -this apparently has ceased    5  Hypokalemia  -potassium of 3 4 will need continued potassium replacement    6  -diabetes mellitus type 2  Metformin has been held because of low GFR patient's blood sugars continue to be monitored  7 -history of lung cancer stage IV   Subjective:   Patient is sleepy daughter states that she did eat for her part of her meal     Review of Systems   Reason unable to perform ROS: Unable to elicit review of systems patient is sleeping  Objective:     Vitals: Blood pressure 125/67, pulse 68, temperature 97 5 °F (36 4 °C), temperature source Oral, resp  rate 18, height 5' 4" (1 626 m), weight 80 7 kg (178 lb), SpO2 94 %  ,Body mass index is 30 55 kg/m²  Intake/Output Summary (Last 24 hours) at 1/7/2022 1454  Last data filed at 1/7/2022 0800  Gross per 24 hour   Intake 120 ml   Output 1175 ml   Net -1055 ml       Current Medications: Reviewed    Physical Exam:   Physical Exam  HENT:      Head: Normocephalic  Right Ear: Ear canal and external ear normal       Left Ear: Ear canal and external ear normal    Cardiovascular:      Rate and Rhythm: Normal rate and regular rhythm  Pulses: Normal pulses  Heart sounds: Normal heart sounds  Pulmonary:      Breath sounds: Normal breath sounds     Abdominal:      General: Bowel sounds are normal    Musculoskeletal:      Cervical back: Neck supple  Skin:     General: Skin is warm  Neurological:      Comments: Patient is sleeping unable to give history          Invasive Devices  Report    Peripheral Intravenous Line            Peripheral IV 01/05/22 Left Antecubital 1 day                Lab, Imaging and other studies: I have personally reviewed pertinent reports

## 2022-01-07 NOTE — ASSESSMENT & PLAN NOTE
· Abnormal CT with ground-glass opacities in bilateral lower lobes  Patient tested negative for COVID on admission and reported no respiratory complaints  · Given unusual complaints of fatigue and confusion worsening in the past 48 hours, we performed a repeat COVID test which was also negative  · Son who visited yesterday developed symptoms of covid following the visit    Will await family's report about whether he tests positive

## 2022-01-07 NOTE — PLAN OF CARE
Problem: OCCUPATIONAL THERAPY ADULT  Goal: Performs self-care activities at highest level of function for planned discharge setting  See evaluation for individualized goals  Description: Treatment Interventions: ADL retraining,Functional transfer training,Endurance training,Patient/family training,Fine motor coordination activities,Compensatory technique education,Continued evaluation,Energy conservation,Activityengagement  Equipment Recommended: Shower/Tub chair with back ($),Bedside commode (use of RW at this time)       See flowsheet documentation for full assessment, interventions and recommendations  Outcome: Progressing  Note: Limitation: Decreased ADL status,Decreased cognition,Decreased endurance,Decreased self-care trans,Decreased high-level ADLs     Assessment: Chart review completed and pt seen for OT tx session to assess progress, improve engagement and assist w/ DC planning  MRI impression R basa; ganglia and R caudate acute ischemia and neurology consulted for altered mental status  Trauma signed off and transferred care to [facility] service  Pt lethargic upon arrival and required consistent stimuli to sustain arousal  Agreeable to participate in session and arousal, active participation improved upon sitting at EOB  Pt required increased physical assistance to complete bed mobility supine to sit  Pt required increased physical assistance to stand from EOB w/ mod A and progressed to min A from recliner chair  Pt required mod --> max A to complete LBD and demonstrated poor sitting balance unsupported at EOB w/ L trunk lean  Pt required min A <> mod A for short distance functional mobility using RW  Pt seated OOB in chair at end of session w/ needs met, call bell in reach eating breakfast  Able to feed self and mange cutting using utensils bilaterally   From an OT perspective, recommend post acute rehab at this time when medically stable for discharge from acute care as pt required increased assistance to complete ADLs   Will continue to follow     OT Discharge Recommendation: Post acute rehabilitation services

## 2022-01-07 NOTE — APP STUDENT NOTE
S: Erasmo Ledesma  is a 65 y/o female who was admitted for headaches, altered mental status and frequent falls at home HOD #4  Patient states she has had no pain overnight  Denies chest pain, SOB, and belly pain  Nursing reported that the patient has required clean intermittent catheterization x 4 for urinary retention since 22  Nursing also reports that the patient remains hypertensive this morning, but just gave her PO medications  Nursing notes that her mental status is improved since yesterday  O:   Vitals:  Temp 36 6 HR 56 RR 16 /82 (118) repeat 192/78  Shortly after straight cath and PO meds 178/80  O2 sat 90% on RA    I: 782 5 mL O: 3,625 mL -2842 5 mL    General: patient was being straight cathed while I was in the room  Her mental status was improved since yesterday, she was able to say her name, , location and what she had for dinner last night, but was unable to state the year, which nursing notes she never gets right  Calm and cooperative and able to follow commands  Started to close eyes and respond less to questioning as I was leaving  CV: RRR, no murmurs, rubs or gallops    Pulm: lungs clear to ascultation bilaterally, no wheezes, rales or rhonchi    Abdomen: abdomen soft and non-tender to palpation     MSK: no lower leg edema bilaterally, strength 4/5 bilaterally in UE    Labs:     Fingerstick glucose: 151, overnight 245    COVID/Flu/RSV: negative x3    CBC: potassium decreased at 3 4   Cr:  1 40  Total protein: decreased at 5 6   Albumin: decreased at 2 6     CT w/o contrast: evolving right basal ganglia infarct  No hemorrhage or signs of mass effect      EEG: This Routine EEG recorded during wakefulness is abnormal  Background activities and posteriorly dominant rhythm are mildly too slow suggesting mild diffuse cerebral dysfunction of nonspecific etiology  A/P:    1   Altered mental status/lethargy:  · Patient had been lethargic overnight on 22 which continued into the day on 1/6/22  She was unable to follow commands and had difficulty keeping her eyes open  · EEG on 1/6/22 showed: This Routine EEG recorded during wakefulness is abnormal  Background activities and posteriorly dominant rhythm are mildly too slow suggesting mild diffuse cerebral dysfunction of nonspecific etiology  · Patient also had consistent hypertension on 1/6/22 which she was given PRN hydralazine for  There was concern for hypertensive encephalopathy  ·  Blood pressure improved overnight, but was elevated again this morning at 192/78  Blood pressure was retaken soon after PO meds and straight catherization and there was slight improvement at 178/80  · Continue PO hypertensive meds: losartan and amlodipine and PRN hydralazine for HTN  · Continue to monitor mental status with neuro checks  2  Urinary retention:  · Pt experiencing urinary retention and has required clean intermittent catheterization x 4  Would consider putting in a bahena if after drinking and ambulating now success to urinate on her own  3  Acute ischemic stroke:  · MRI head w/o contrast on 1/5/22 revealed right basal ganglia and right caudate acute ischemia  · MRI brain with contrast 1/5/22 showed no enhancing lesions and MRA carotids with and without contrast showed no stenosis of vessels  · Continue atorvastatin 40 mg and Eliquis 5 mg and add on ASA per her oncologist for concern for thrombosis from chemotherapy  · CT head w/o contrast on 1/6/22 showed evolving right basal ganglia infarct  No hemorrhage or signs of mass effect      · EEG on 1/6/22 showed: This Routine EEG recorded during wakefulness is abnormal  Background activities and posteriorly dominant rhythm are mildly too slow suggesting mild diffuse cerebral dysfunction of nonspecific etiology  4  UTI due to E coli:   · Urine culture showed >100,000 colonies of E/coli  · Continue ceftriaxone 1,000 mg daily and vancomycin due to prior C diff history    5   Afib  · Continue home meds: sotaolol, metoprolol and Eliquis     6  Lung cancer history with mets to bone:  · Patient diagnosed in 2016 with mets to the bone  Received radiation to the T9-11 spine and scapula  Repeat radiation to scapula in 2018 2/2021 patient noted to have new increase in size of pulmonary nodule and was restarted on chemo  · Heme/onco consulted and believes stroke is from failure of Eliquis and instructed to put on ASA    7  CKD stage 3:  · CR 1 40 stable at baseline today   · Continue to avoid nephrotoxin medications    8  Type 2 DM:  · Blood glucose within goal 140-180 during hospitalization  · Continue to monitor via accu-checks and insulin lispor (humalog) 100 units/mL 1-6 units SQ before meals      Isis BERNARD

## 2022-01-07 NOTE — PLAN OF CARE
Problem: PHYSICAL THERAPY ADULT  Goal: Performs mobility at highest level of function for planned discharge setting  See evaluation for individualized goals  Description: Treatment/Interventions: Functional transfer training,LE strengthening/ROM,Therapeutic exercise,Endurance training,Cognitive reorientation,Patient/family training,Equipment eval/education,Bed mobility,Gait training  Equipment Recommended: Rosy Sanders       See flowsheet documentation for full assessment, interventions and recommendations  Note: Prognosis: Fair  Problem List: Decreased strength,Decreased endurance,Impaired balance,Decreased mobility,Decreased cognition,Decreased safety awareness,Obesity  Assessment: Pt seen for PT intervention, since PT evaluation, pt w/ dx of acute ischemic CVA  Pt w/ waxing and waning alertness per RN  Pt appears more confused today than previous session, overall she requires increased level of A to perform all functional mobility  She also has more limited ambulation distance secondary to fatigue and reported fear of falling due to L knee buckling  Goals continue to be appropriate for pt at this time, however discharge recommendation is now for post-acute inpatient rehab as pt is demonstrating significant decline in functional mobility from her baseline and would benefit in order to maximize independence and decrease risk for falls            PT Discharge Recommendation: (S) Post acute rehabilitation services          See flowsheet documentation for full assessment

## 2022-01-07 NOTE — ASSESSMENT & PLAN NOTE
· Abnormal UA with reported new onset urinary frequency per my colleague; of note patient denied all urinary symptoms to me during my evaluation yesterday  · UCx reviewed  · Given Cipro x 1 then transitioned to rocephin 1/5/22--received 3 doses of Rocephin, reviewed urine culture    Will change to Keflex for 2 more days to complete 5 day course  · New leukocytosis noted during the stay, continue to monitor

## 2022-01-08 PROBLEM — R19.7 DIARRHEA: Status: RESOLVED | Noted: 2022-01-04 | Resolved: 2022-01-08

## 2022-01-08 LAB
GLUCOSE SERPL-MCNC: 135 MG/DL (ref 65–140)
GLUCOSE SERPL-MCNC: 168 MG/DL (ref 65–140)
GLUCOSE SERPL-MCNC: 220 MG/DL (ref 65–140)

## 2022-01-08 PROCEDURE — 99232 SBSQ HOSP IP/OBS MODERATE 35: CPT | Performed by: PHYSICIAN ASSISTANT

## 2022-01-08 PROCEDURE — 82948 REAGENT STRIP/BLOOD GLUCOSE: CPT

## 2022-01-08 RX ADMIN — AMLODIPINE BESYLATE 5 MG: 5 TABLET ORAL at 20:37

## 2022-01-08 RX ADMIN — SOTALOL HYDROCHLORIDE 80 MG: 80 TABLET ORAL at 17:40

## 2022-01-08 RX ADMIN — APIXABAN 5 MG: 5 TABLET, FILM COATED ORAL at 09:25

## 2022-01-08 RX ADMIN — FOLIC ACID 1 MG: 1 TABLET ORAL at 09:26

## 2022-01-08 RX ADMIN — Medication 125 MG: at 21:53

## 2022-01-08 RX ADMIN — SOTALOL HYDROCHLORIDE 80 MG: 80 TABLET ORAL at 09:25

## 2022-01-08 RX ADMIN — INSULIN LISPRO 2 UNITS: 100 INJECTION, SOLUTION INTRAVENOUS; SUBCUTANEOUS at 13:39

## 2022-01-08 RX ADMIN — PANTOPRAZOLE SODIUM 40 MG: 40 TABLET, DELAYED RELEASE ORAL at 05:43

## 2022-01-08 RX ADMIN — CEPHALEXIN 500 MG: 500 CAPSULE ORAL at 01:04

## 2022-01-08 RX ADMIN — CEPHALEXIN 500 MG: 500 CAPSULE ORAL at 05:43

## 2022-01-08 RX ADMIN — VITAM B12 100 MCG: 100 TAB at 09:26

## 2022-01-08 RX ADMIN — METOPROLOL TARTRATE 25 MG: 25 TABLET, FILM COATED ORAL at 09:25

## 2022-01-08 RX ADMIN — AMLODIPINE BESYLATE 5 MG: 5 TABLET ORAL at 09:26

## 2022-01-08 RX ADMIN — LORATADINE 10 MG: 10 TABLET ORAL at 09:26

## 2022-01-08 RX ADMIN — ASPIRIN 81 MG: 81 TABLET, COATED ORAL at 09:25

## 2022-01-08 RX ADMIN — APIXABAN 5 MG: 5 TABLET, FILM COATED ORAL at 17:40

## 2022-01-08 RX ADMIN — BUSPIRONE HYDROCHLORIDE 7.5 MG: 5 TABLET ORAL at 20:36

## 2022-01-08 RX ADMIN — INSULIN LISPRO 1 UNITS: 100 INJECTION, SOLUTION INTRAVENOUS; SUBCUTANEOUS at 09:32

## 2022-01-08 RX ADMIN — Medication 2000 UNITS: at 09:25

## 2022-01-08 RX ADMIN — METOPROLOL TARTRATE 25 MG: 25 TABLET, FILM COATED ORAL at 20:36

## 2022-01-08 RX ADMIN — BUSPIRONE HYDROCHLORIDE 7.5 MG: 5 TABLET ORAL at 09:26

## 2022-01-08 RX ADMIN — LOSARTAN POTASSIUM 50 MG: 50 TABLET, FILM COATED ORAL at 09:26

## 2022-01-08 RX ADMIN — CEPHALEXIN 500 MG: 500 CAPSULE ORAL at 17:40

## 2022-01-08 RX ADMIN — CEPHALEXIN 500 MG: 500 CAPSULE ORAL at 12:37

## 2022-01-08 RX ADMIN — Medication 125 MG: at 09:25

## 2022-01-08 RX ADMIN — Medication 3 MG: at 22:12

## 2022-01-08 RX ADMIN — ATORVASTATIN CALCIUM 80 MG: 40 TABLET, FILM COATED ORAL at 17:40

## 2022-01-08 RX ADMIN — VENLAFAXINE HYDROCHLORIDE 37.5 MG: 37.5 CAPSULE, EXTENDED RELEASE ORAL at 22:12

## 2022-01-08 NOTE — CASE MANAGEMENT
Case Management Progress Note    Patient name Darrius Matthew  Location [address] 233/S -01 MRN [id number]  : 1950 Date 2022       LOS (days): 4  Geometric Mean LOS (GMLOS) (days): 4 40  Days to GMLOS:0 7        OBJECTIVE:        Current admission status: Inpatient  Preferred Pharmacy: No Pharmacies Listed  Primary Care Provider: Edmund Mosley DO    Primary Insurance: MEDICARE  Secondary Insurance: AARP    PROGRESS NOTE:    Cm requested update from admissions team with acute rehab  Cm informed that team will review with [facility] physician today, but that if she is appropriate the only location with availability at this time is [facility]  Cm will need to update family about location  Cm will also send update to SNF locations of family choice as well, [facility] and [facility]

## 2022-01-08 NOTE — PLAN OF CARE
Problem: MOBILITY - ADULT  Goal: Maintain or return to baseline ADL function  Description: INTERVENTIONS:  -  Assess patient's ability to carry out ADLs; assess patient's baseline for ADL function and identify physical deficits which impact ability to perform ADLs (bathing, care of mouth/teeth, toileting, grooming, dressing, etc )  - Assess/evaluate cause of self-care deficits   - Assess range of motion  - Assess patient's mobility; develop plan if impaired  - Assess patient's need for assistive devices and provide as appropriate  - Encourage maximum independence but intervene and supervise when necessary  - Involve family in performance of ADLs  - Assess for home care needs following discharge   - Consider OT consult to assist with ADL evaluation and planning for discharge  - Provide patient education as appropriate  Outcome: Progressing  Goal: Maintains/Returns to pre admission functional level  Description: INTERVENTIONS:  - Perform BMAT or MOVE assessment daily    - Set and communicate daily mobility goal to care team and patient/family/caregiver     - Collaborate with rehabilitation services on mobility goals if consulted  -  - Out of bed for toileting  - Record patient progress and toleration of activity level   Outcome: Progressing     Problem: Prexisting or High Potential for Compromised Skin Integrity  Goal: Skin integrity is maintained or improved  Description: INTERVENTIONS:  - Identify patients at risk for skin breakdown  - Assess and monitor skin integrity  - Assess and monitor nutrition and hydration status  - Monitor labs   - Assess for incontinence   - Turn and reposition patient  - Assist with mobility/ambulation  - Relieve pressure over bony prominences  - Avoid friction and shearing  - Provide appropriate hygiene as needed including keeping skin clean and dry  - Evaluate need for skin moisturizer/barrier cream  - Collaborate with interdisciplinary team   - Patient/family teaching  - Consider wound care consult   Outcome: Progressing     Problem: Potential for Falls  Goal: Patient will remain free of falls  Description: INTERVENTIONS:  - Educate patient/family on patient safety including physical limitations  - Instruct patient to call for assistance with activity   - Consult OT/PT to assist with strengthening/mobility   - Keep Call bell within reach  - Keep bed low and locked with side rails adjusted as appropriate  - Keep care items and personal belongings within reach  - Initiate and maintain comfort rounds  - Make Fall Risk Sign visible to staff  -  - Apply yellow socks and bracelet for high fall risk patients  - Consider moving patient to room near nurses station  Outcome: Progressing     Problem: Nutrition/Hydration-ADULT  Goal: Nutrient/Hydration intake appropriate for improving, restoring or maintaining nutritional needs  Description: Monitor and assess patient's nutrition/hydration status for malnutrition  Collaborate with interdisciplinary team and initiate plan and interventions as ordered  Monitor patient's weight and dietary intake as ordered or per policy  Utilize nutrition screening tool and intervene as necessary  Determine patient's food preferences and provide high-protein, high-caloric foods as appropriate       INTERVENTIONS:  - Monitor oral intake, urinary output, labs, and treatment plans  - Assess nutrition and hydration status and recommend course of action  - Evaluate amount of meals eaten  - Assist patient with eating if necessary   - Allow adequate time for meals  - Recommend/ encourage appropriate diets, oral nutritional supplements, and vitamin/mineral supplements  - Order, calculate, and assess calorie counts as needed  - Recommend, monitor, and adjust tube feedings and TPN/PPN based on assessed needs  - Assess need for intravenous fluids  - Provide specific nutrition/hydration education as appropriate  - Include patient/family/caregiver in decisions related to nutrition  Outcome: Progressing

## 2022-01-08 NOTE — PROGRESS NOTES
[facility]  Progress Note Ritika Zack 1950, 66 y o  female MRN: [id number]  Unit/Bed#: S -01 Encounter: [id number]  Primary Care Provider: aTco Martin DO   Date and time admitted to hospital: 1/3/2022  7:30 PM    * Acute ischemic stroke [facility])  Assessment & Plan  · Patient's initial reason for admission was multiple falls and intermittent confusion  · CTH negative  · MRI: Right basal ganglia and right caudate acute ischemia  · Repeat MRI of the brain with gadolinium and MRA imaging both noted to be unremarkable  · Continue Lipitor  · Echo unremarkable  · Patient with known h/o Afib  · Continue Eliquis AC  Added aspirin per her oncologist Dr Sophie Sánchez for concern of thrombosis caused by chemotherapy  · PT/OT narendra noted recommending rehab - plan for d/c to [facility] 1/9/22  · No longer need permissive HTN    Encephalopathy  Assessment & Plan  · Fluctuating mental status issues during hospital stay  · Patient on 1/8/2022 is noted to be alert, awake, oriented and following commands  · She was noted to eat breakfast and all of lunch with daughter at bedside who also notes substantial improvement  · Likely related to underlying CVA and possible hospital-acquired delirium however now improving  · EEG with nonspecific slowing  · COVID negative x2  · No evidence of bleeding or change on repeat head CT    Cystitis  Assessment & Plan  · Abnormal UA with reported new onset urinary frequency during admission  · UCx reviewed  · Received 3 doses of IV ceftriaxone  This is noted to be E coli and is susceptible to Keflex    · Continue Keflex for an additional 1 day to total 5 day total course    Urinary retention  Assessment & Plan  · New onset urinary retention, required CIC x 4  · Bonner catheter was placed on 1/7/2022  · Voiding trial in approximately 1 week  · Outpatient urology consult  · Ensure adequate bowel regimen    Lung cancer [facility])  Assessment & Plan  · History of lung cancer, metastatic to bone- on chemotherapy every 3 weeks being managed by Dr Shraddha Jefferson  · Patient may require a change in her regimen due to acute stroke  · Prior discussions were had with patient's daughter that chemotherapy would not be given while at rehab  Chronic kidney disease, stage 3 [facility])  Assessment & Plan  Lab Results   Component Value Date    EGFR 37 01/07/2022    EGFR 31 01/06/2022    EGFR 40 01/05/2022    CREATININE 1 40 (H) 01/07/2022    CREATININE 1 59 (H) 01/06/2022    CREATININE 1 30 01/05/2022     · Creatinine remains within her baseline  · Avoid nephrotoxic agents  · Resume oral diuretics    Multiple falls  Assessment & Plan  · Patient reported to have multiple falls (and reports of confusion and also with headaches/fatigue recently); abnormal MRI of the brain see above  Sustained mild contusions/buttock hematoma  · Head CT unremarkable  · PT/OT re-evaluation appreciated, now being recommended for rehab instead of home with VNA  · Plan for discharge to acute rehab center at [facility] on 1/9/2022    Hypertension  Assessment & Plan  · Initially treated with permissive HTN in acute CVA   · Now on Norvasc 5 mg p o  B i d  And blood pressure currently 791 systolic    Ground glass opacity present on imaging of lung  Assessment & Plan  · Abnormal CT with ground-glass opacities in bilateral lower lobes  · Patient tested negative for COVID on admission and reported no respiratory complaints  Diarrhea-resolved as of 1/8/2022  Assessment & Plan  · Patient apparently has previous history of C diff and has had fairly persistent diarrhea since  Unclear if this was related to chemotherapy or if perhaps patient has not cleared her C diff infection; noted that he she actually had C diff positive PCR but not EIA in October      · Continue prophylactic vancomycin for 4 more days (72 hrs post stopping keflex)  · Recommend rechecking for C diff now to see if she is still truly positive and requires additional treatment  However since the time that the sample was requested, patient has ceased to have any additional events of diarrhea and therefore no sample could be sent    Atrial fibrillation [facility])  Assessment & Plan  · Continue home meds including sotalol, metoprolol and Eliquis    Diabetes type 2, controlled [facility])  Assessment & Plan  Lab Results   Component Value Date    HGBA1C 5 8 (H) 2022       Recent Labs     22  1651 22  2128 22  0711 22  1046   POCGLU 128 213* 168* 220*       Blood Sugar Average: Last 72 hrs:  (P) 186 [id number]   · Monitor on Accu-Cheks  · Diabetic diet  · Hold metformin and Tradjenta  · Given A1c of 5 8, would discontinue Tradjenta on discharge and continue metformin alone  Hypokalemia  Assessment & Plan  · Noted to be 3 4 yesterday and repleted      VTE Pharmacologic Prophylaxis: VTE Score: 4 Moderate Risk (Score 3-4) - Pharmacological DVT Prophylaxis Ordered: apixaban (Eliquis)  Patient Centered Rounds: I performed bedside rounds with nursing staff today  Discussions with Specialists or Other Care Team Provider: nursing, Case mgmt    Education and Discussions with Family / Patient: Attempted to update  (brother) via phone  Left voicemail  discussed with daughter at 1500    Time Spent for Care: 30 minutes  More than 50% of total time spent on counseling and coordination of care as described above  Current Length of Stay: 4 day(s)  Current Patient Status: Inpatient   Certification Statement: The patient will continue to require additional inpatient hospital stay due to awaiting ARC bed  Discharge Plan: Anticipate discharge tomorrow to rehab facility  Code Status: Level 3 - DNAR and DNI    Subjective:   Patient reports that she did not want the potatoes because they were spicy  Daughter at bedside said today she was so much better  No chest pain or SOB      Objective:     Vitals:   Temp (24hrs), Av 3 °F (36 8 °C), Min:98 1 °F (36 7 °C), Max:98 7 °F (37 1 °C)    Temp:  [98 1 °F (36 7 °C)-98 7 °F (37 1 °C)] 98 7 °F (37 1 °C)  HR:  [55-69] 63  Resp:  [18] 18  BP: (140-186)/(62-75) 150/75  SpO2:  [96 %-98 %] 96 %  Body mass index is 30 55 kg/m²  Input and Output Summary (last 24 hours): Intake/Output Summary (Last 24 hours) at 1/8/2022 1455  Last data filed at 1/8/2022 0843  Gross per 24 hour   Intake 240 ml   Output 1150 ml   Net -910 ml       Physical Exam:   Physical Exam  Vitals and nursing note reviewed  Constitutional:       General: She is not in acute distress  Appearance: Normal appearance  She is ill-appearing  She is not diaphoretic  HENT:      Head: Normocephalic and atraumatic  Mouth/Throat:      Mouth: Mucous membranes are dry  Cardiovascular:      Rate and Rhythm: Normal rate and regular rhythm  Heart sounds: No murmur heard  Pulmonary:      Effort: Pulmonary effort is normal       Breath sounds: Normal breath sounds  No stridor  No wheezing, rhonchi or rales  Abdominal:      General: Bowel sounds are normal       Palpations: Abdomen is soft  There is no mass  Tenderness: There is no abdominal tenderness  There is no guarding  Musculoskeletal:      Right lower leg: No edema  Left lower leg: No edema  Skin:     General: Skin is warm and dry  Neurological:      Mental Status: She is alert  Comments: Awake, alert, oriented to person, place  Follows commands  Tongue midline      Psychiatric:         Mood and Affect: Mood normal          Behavior: Behavior normal           Additional Data:     Labs:  Results from last 7 days   Lab Units 01/07/22  0542   WBC Thousand/uL 12 51*   HEMOGLOBIN g/dL 11 5   HEMATOCRIT % 36 0   PLATELETS Thousands/uL 167   NEUTROS PCT % 82*   LYMPHS PCT % 7*   MONOS PCT % 9   EOS PCT % 0     Results from last 7 days   Lab Units 01/07/22  0542   SODIUM mmol/L 141   POTASSIUM mmol/L 3 4*   CHLORIDE mmol/L 105   CO2 mmol/L 27   BUN mg/dL 15 CREATININE mg/dL 1 40*   ANION GAP mmol/L 9   CALCIUM mg/dL 8 9   ALBUMIN g/dL 2 6*   TOTAL BILIRUBIN mg/dL 0 61   ALK PHOS U/L 97   ALT U/L 23   AST U/L 28   GLUCOSE RANDOM mg/dL 133     Results from last 7 days   Lab Units 01/03/22  1940   INR  1 13     Results from last 7 days   Lab Units 01/08/22  1046 01/08/22  0711 01/07/22  2128 01/07/22  1651 01/07/22  1057 01/07/22  0738 01/06/22  2105 01/06/22  1557 01/06/22  1109 01/06/22  0719 01/05/22  2117 01/05/22  1646   POC GLUCOSE mg/dl 220* 168* 213* 128 256* 151* 245* 188* 202* 162* 219* 157*     Results from last 7 days   Lab Units 01/05/22  0644   HEMOGLOBIN A1C % 5 8*           Lines/Drains:  Invasive Devices  Report    Peripheral Intravenous Line            Peripheral IV 01/05/22 Left Antecubital 2 days          Drain            Urethral Catheter Double-lumen 16 Fr  <1 day              Urinary Catheter:  Goal for removal: Voiding trial when ambulation improves               Imaging: No pertinent imaging reviewed      Recent Cultures (last 7 days):   Results from last 7 days   Lab Units 01/04/22  1449   URINE CULTURE  >100,000 cfu/ml Escherichia coli*       Last 24 Hours Medication List:   Current Facility-Administered Medications   Medication Dose Route Frequency Provider Last Rate    acetaminophen  650 mg Oral Q6H PRN Jeralene Srinivasan, CRNP      amLODIPine  5 mg Oral BID Palmira Clifton PA-C      apixaban  5 mg Oral BID Jeralene Srinivasan, CRNP      aspirin  81 mg Oral Daily Palmira Clifton PA-C      atorvastatin  80 mg Oral Daily With [employer] [place] [employer]      busPIRone  7 5 mg Oral BID Jeralene Srinivasan, CRGERALDO      cephalexin  500 mg Oral Q6H [address] Rob Kebede PA-C      cholecalciferol  2,000 Units Oral Daily Jeralene Srinivasan, CRGERALDO      cyanocobalamin  100 mcg Oral Daily Jeralene Srinivasan, CRGERALDO      docusate sodium  100 mg Oral BID PRN Rob Kebede PA-C      folic acid  1 mg Oral Daily Jeralene Srinivasan, EDUARDO      hydrALAZINE  10 mg Intravenous Q6H PRN Juan Pablo Campbell MD      insulin lispro  1-6 Units Subcutaneous TID AC EDUARDO Gasca      loratadine  10 mg Oral Daily EDUARDO Gasca      losartan  50 mg Oral Daily Luke Eldridge MD      melatonin  3 mg Oral HS Palmira Clifton PA-C      metoprolol tartrate  25 mg Oral Q12H [facility] EDUARDO Gasca      pantoprazole  40 mg Oral Early Morning EDUARDO Gasca      prochlorperazine  10 mg Oral Q12H PRN EDUARDO Gasca      sotalol  80 mg Oral BID EDUARDO Gasca      vancomycin  125 mg Oral Q12H [facility] Palmira Clifton PA-C      venlafaxine  37 5 mg Oral HS EDUARDO Gasca          Today, Patient Was Seen By: Rey Hutchins PA-C    **Please Note: This note may have been constructed using a voice recognition system  **

## 2022-01-08 NOTE — ASSESSMENT & PLAN NOTE
· History of lung cancer, metastatic to bone- on chemotherapy every 3 weeks being managed by Dr Zoraida Call  · Patient may require a change in her regimen due to acute stroke  · Prior discussions were had with patient's daughter that chemotherapy would not be given while at rehab

## 2022-01-08 NOTE — ASSESSMENT & PLAN NOTE
Lab Results   Component Value Date    EGFR 37 01/07/2022    EGFR 31 01/06/2022    EGFR 40 01/05/2022    CREATININE 1 40 (H) 01/07/2022    CREATININE 1 59 (H) 01/06/2022    CREATININE 1 30 01/05/2022     · Creatinine remains within her baseline  · Avoid nephrotoxic agents  · Resume oral diuretics

## 2022-01-08 NOTE — ASSESSMENT & PLAN NOTE
· New onset urinary retention, required CIC x 4  · Bonner catheter was placed on 1/7/2022  · Voiding trial in approximately 1 week  · Outpatient urology consult  · Ensure adequate bowel regimen

## 2022-01-08 NOTE — ASSESSMENT & PLAN NOTE
· Patient apparently has previous history of C diff and has had fairly persistent diarrhea since  Unclear if this was related to chemotherapy or if perhaps patient has not cleared her C diff infection; noted that he she actually had C diff positive PCR but not EIA in October  · Continue prophylactic vancomycin for 4 more days (72 hrs post stopping keflex)  · Recommend rechecking for C diff now to see if she is still truly positive and requires additional treatment    However since the time that the sample was requested, patient has ceased to have any additional events of diarrhea and therefore no sample could be sent

## 2022-01-08 NOTE — ARC ADMISSION
Reviewed patient's case with [facility] physician - patient is possible ARC candidate pending improvement in level of alertness/ability to participate in aggressive rehab program  CM has been updated and will touch base Sunday morning

## 2022-01-08 NOTE — ASSESSMENT & PLAN NOTE
· Initially treated with permissive HTN in acute CVA   · Now on Norvasc 5 mg p o  B i d   And blood pressure currently 322 systolic

## 2022-01-08 NOTE — ASSESSMENT & PLAN NOTE
· Abnormal UA with reported new onset urinary frequency during admission  · UCx reviewed  · Received 3 doses of IV ceftriaxone  This is noted to be E coli and is susceptible to Keflex    · Continue Keflex for an additional 1 day to total 5 day total course

## 2022-01-08 NOTE — ASSESSMENT & PLAN NOTE
· Abnormal CT with ground-glass opacities in bilateral lower lobes  · Patient tested negative for COVID on admission and reported no respiratory complaints

## 2022-01-08 NOTE — ASSESSMENT & PLAN NOTE
· Patient's initial reason for admission was multiple falls and intermittent confusion  · CTH negative  · MRI: Right basal ganglia and right caudate acute ischemia  · Repeat MRI of the brain with gadolinium and MRA imaging both noted to be unremarkable  · Continue Lipitor  · Echo unremarkable  · Patient with known h/o Afib  · Continue Eliquis AC   Added aspirin per her oncologist Dr Magdaleno Burden for concern of thrombosis caused by chemotherapy  · PT/OT evals noted recommending rehab - plan for d/c to [facility] 1/9/22  · No longer need permissive HTN

## 2022-01-08 NOTE — ASSESSMENT & PLAN NOTE
· Patient reported to have multiple falls (and reports of confusion and also with headaches/fatigue recently); abnormal MRI of the brain see above    Sustained mild contusions/buttock hematoma  · Head CT unremarkable  · PT/OT re-evaluation appreciated, now being recommended for rehab instead of home with [facility]  · Plan for discharge to acute rehab center at [facility] on 1/9/2022

## 2022-01-08 NOTE — ASSESSMENT & PLAN NOTE
· Fluctuating mental status issues during hospital stay  · Patient on 1/8/2022 is noted to be alert, awake, oriented and following commands  · She was noted to eat breakfast and all of lunch with daughter at bedside who also notes substantial improvement  · Likely related to underlying CVA and possible hospital-acquired delirium however now improving      · EEG with nonspecific slowing  · COVID negative x2  · No evidence of bleeding or change on repeat head CT

## 2022-01-08 NOTE — ASSESSMENT & PLAN NOTE
Lab Results   Component Value Date    HGBA1C 5 8 (H) 01/05/2022       Recent Labs     01/07/22  1651 01/07/22  2128 01/08/22  0711 01/08/22  1046   POCGLU 128 213* 168* 220*       Blood Sugar Average: Last 72 hrs:  (P) 186 [id number]   · Monitor on Accu-Cheks  · Diabetic diet  · Hold metformin and Tradjenta  · Given A1c of 5 8, would discontinue Tradjenta on discharge and continue metformin alone

## 2022-01-09 ENCOUNTER — TELEPHONE (OUTPATIENT)
Dept: OTHER | Facility: OTHER | Age: 72
End: 2022-01-09

## 2022-01-09 LAB
GLUCOSE SERPL-MCNC: 174 MG/DL (ref 65–140)
GLUCOSE SERPL-MCNC: 178 MG/DL (ref 65–140)
GLUCOSE SERPL-MCNC: 236 MG/DL (ref 65–140)
SARS-COV-2 RNA RESP QL NAA+PROBE: NEGATIVE

## 2022-01-09 PROCEDURE — 82948 REAGENT STRIP/BLOOD GLUCOSE: CPT

## 2022-01-09 PROCEDURE — 97110 THERAPEUTIC EXERCISES: CPT

## 2022-01-09 PROCEDURE — 99232 SBSQ HOSP IP/OBS MODERATE 35: CPT | Performed by: PHYSICIAN ASSISTANT

## 2022-01-09 PROCEDURE — 97530 THERAPEUTIC ACTIVITIES: CPT

## 2022-01-09 PROCEDURE — 97116 GAIT TRAINING THERAPY: CPT

## 2022-01-09 RX ORDER — ASPIRIN 81 MG/1
81 TABLET ORAL DAILY
Status: CANCELLED | OUTPATIENT
Start: 2022-01-09

## 2022-01-09 RX ORDER — ACETAMINOPHEN 325 MG/1
650 TABLET ORAL EVERY 6 HOURS PRN
Status: CANCELLED | OUTPATIENT
Start: 2022-01-09

## 2022-01-09 RX ORDER — SOTALOL HYDROCHLORIDE 80 MG/1
80 TABLET ORAL 2 TIMES DAILY
Status: CANCELLED | OUTPATIENT
Start: 2022-01-09

## 2022-01-09 RX ORDER — BUSPIRONE HYDROCHLORIDE 5 MG/1
7.5 TABLET ORAL 2 TIMES DAILY
Status: CANCELLED | OUTPATIENT
Start: 2022-01-09

## 2022-01-09 RX ORDER — LOSARTAN POTASSIUM 50 MG/1
50 TABLET ORAL DAILY
Status: CANCELLED | OUTPATIENT
Start: 2022-01-09

## 2022-01-09 RX ORDER — LANOLIN ALCOHOL/MO/W.PET/CERES
3 CREAM (GRAM) TOPICAL
Status: CANCELLED | OUTPATIENT
Start: 2022-01-09

## 2022-01-09 RX ORDER — FUROSEMIDE 40 MG/1
40 TABLET ORAL DAILY
Status: DISCONTINUED | OUTPATIENT
Start: 2022-01-09 | End: 2022-01-11 | Stop reason: HOSPADM

## 2022-01-09 RX ORDER — AMLODIPINE BESYLATE 5 MG/1
5 TABLET ORAL 2 TIMES DAILY
Status: CANCELLED | OUTPATIENT
Start: 2022-01-09

## 2022-01-09 RX ORDER — MELATONIN
2000 DAILY
Status: CANCELLED | OUTPATIENT
Start: 2022-01-09

## 2022-01-09 RX ORDER — FOLIC ACID 1 MG/1
1 TABLET ORAL DAILY
Status: CANCELLED | OUTPATIENT
Start: 2022-01-09

## 2022-01-09 RX ORDER — ATORVASTATIN CALCIUM 40 MG/1
80 TABLET, FILM COATED ORAL
Status: CANCELLED | OUTPATIENT
Start: 2022-01-09

## 2022-01-09 RX ORDER — PANTOPRAZOLE SODIUM 40 MG/1
40 TABLET, DELAYED RELEASE ORAL
Status: CANCELLED | OUTPATIENT
Start: 2022-01-10

## 2022-01-09 RX ORDER — LORATADINE 10 MG/1
10 TABLET ORAL DAILY
Status: CANCELLED | OUTPATIENT
Start: 2022-01-09

## 2022-01-09 RX ORDER — CEPHALEXIN 500 MG/1
500 CAPSULE ORAL EVERY 6 HOURS SCHEDULED
Status: CANCELLED | OUTPATIENT
Start: 2022-01-09 | End: 2022-01-09

## 2022-01-09 RX ORDER — LANOLIN ALCOHOL/MO/W.PET/CERES
6 CREAM (GRAM) TOPICAL
Status: DISCONTINUED | OUTPATIENT
Start: 2022-01-09 | End: 2022-01-11 | Stop reason: HOSPADM

## 2022-01-09 RX ORDER — DOCUSATE SODIUM 100 MG/1
100 CAPSULE, LIQUID FILLED ORAL 2 TIMES DAILY PRN
Status: CANCELLED | OUTPATIENT
Start: 2022-01-09

## 2022-01-09 RX ORDER — VENLAFAXINE HYDROCHLORIDE 37.5 MG/1
37.5 CAPSULE, EXTENDED RELEASE ORAL
Status: CANCELLED | OUTPATIENT
Start: 2022-01-09

## 2022-01-09 RX ADMIN — CEPHALEXIN 500 MG: 500 CAPSULE ORAL at 00:57

## 2022-01-09 RX ADMIN — CEPHALEXIN 500 MG: 500 CAPSULE ORAL at 18:12

## 2022-01-09 RX ADMIN — INSULIN LISPRO 1 UNITS: 100 INJECTION, SOLUTION INTRAVENOUS; SUBCUTANEOUS at 08:46

## 2022-01-09 RX ADMIN — SOTALOL HYDROCHLORIDE 80 MG: 80 TABLET ORAL at 08:44

## 2022-01-09 RX ADMIN — Medication 6 MG: at 21:29

## 2022-01-09 RX ADMIN — Medication 2000 UNITS: at 08:43

## 2022-01-09 RX ADMIN — VENLAFAXINE HYDROCHLORIDE 37.5 MG: 37.5 CAPSULE, EXTENDED RELEASE ORAL at 21:31

## 2022-01-09 RX ADMIN — FUROSEMIDE 40 MG: 40 TABLET ORAL at 16:19

## 2022-01-09 RX ADMIN — CEPHALEXIN 500 MG: 500 CAPSULE ORAL at 13:24

## 2022-01-09 RX ADMIN — INSULIN LISPRO 3 UNITS: 100 INJECTION, SOLUTION INTRAVENOUS; SUBCUTANEOUS at 13:25

## 2022-01-09 RX ADMIN — ASPIRIN 81 MG: 81 TABLET, COATED ORAL at 08:44

## 2022-01-09 RX ADMIN — AMLODIPINE BESYLATE 5 MG: 5 TABLET ORAL at 21:28

## 2022-01-09 RX ADMIN — LORATADINE 10 MG: 10 TABLET ORAL at 08:44

## 2022-01-09 RX ADMIN — CEPHALEXIN 500 MG: 500 CAPSULE ORAL at 06:24

## 2022-01-09 RX ADMIN — VITAM B12 100 MCG: 100 TAB at 08:44

## 2022-01-09 RX ADMIN — APIXABAN 5 MG: 5 TABLET, FILM COATED ORAL at 18:12

## 2022-01-09 RX ADMIN — METOPROLOL TARTRATE 25 MG: 25 TABLET, FILM COATED ORAL at 21:29

## 2022-01-09 RX ADMIN — ATORVASTATIN CALCIUM 80 MG: 40 TABLET, FILM COATED ORAL at 16:19

## 2022-01-09 RX ADMIN — INSULIN LISPRO 1 UNITS: 100 INJECTION, SOLUTION INTRAVENOUS; SUBCUTANEOUS at 18:15

## 2022-01-09 RX ADMIN — FOLIC ACID 1 MG: 1 TABLET ORAL at 08:44

## 2022-01-09 RX ADMIN — SOTALOL HYDROCHLORIDE 80 MG: 80 TABLET ORAL at 18:12

## 2022-01-09 RX ADMIN — Medication 125 MG: at 21:30

## 2022-01-09 RX ADMIN — Medication 125 MG: at 08:46

## 2022-01-09 RX ADMIN — METOPROLOL TARTRATE 25 MG: 25 TABLET, FILM COATED ORAL at 08:43

## 2022-01-09 RX ADMIN — ACETAMINOPHEN 650 MG: 325 TABLET, FILM COATED ORAL at 01:00

## 2022-01-09 RX ADMIN — AMLODIPINE BESYLATE 5 MG: 5 TABLET ORAL at 08:43

## 2022-01-09 RX ADMIN — APIXABAN 5 MG: 5 TABLET, FILM COATED ORAL at 08:43

## 2022-01-09 RX ADMIN — BUSPIRONE HYDROCHLORIDE 7.5 MG: 5 TABLET ORAL at 08:43

## 2022-01-09 RX ADMIN — BUSPIRONE HYDROCHLORIDE 7.5 MG: 5 TABLET ORAL at 21:29

## 2022-01-09 RX ADMIN — PANTOPRAZOLE SODIUM 40 MG: 40 TABLET, DELAYED RELEASE ORAL at 06:24

## 2022-01-09 RX ADMIN — LOSARTAN POTASSIUM 50 MG: 50 TABLET, FILM COATED ORAL at 08:43

## 2022-01-09 NOTE — CASE MANAGEMENT
Case Management Progress Note    Patient name [facility] [address] 233/S -01 MRN [id number]  : 1950 Date 2022       LOS (days): 5  Geometric Mean LOS (GMLOS) (days): 4 40  Days to GMLOS:-0 4        OBJECTIVE:        Current admission status: Inpatient  Preferred Pharmacy: No Pharmacies Listed  Primary Care Provider: Clark Kearney DO    Primary Insurance: MEDICARE  Secondary Insurance: AARP    PROGRESS NOTE:    Cm contacted OUR [facility] liaison to inquire if patient would be approved to transfer to acute rehab today  CM informed that acute rehab MD would like the liaison to speak with daughter  Cm informed by liaison that daughter is requesting information about  assistance at home once rehab is complete  Cm sent email to daughter with [employer] information  Despite information provided to acute physician, patient was later denied for admission  Cm will seek bed at referred SNF facilities  Daughter would prefer 1)[facility] and then [facility]   Daughter is now aware of the [facility] denial

## 2022-01-09 NOTE — ASSESSMENT & PLAN NOTE
· Fluctuating mental status issues during hospital stay  · Patient on 1/8/2022 and 1/9/22 is noted to be alert, awake, oriented and following commands  · Patient now eating meals  · Likely related to underlying CVA and possible hospital-acquired delirium however now improving      · EEG with nonspecific slowing  · COVID negative x2  · No evidence of bleeding or change on repeat head CT

## 2022-01-09 NOTE — ASSESSMENT & PLAN NOTE
Lab Results   Component Value Date    HGBA1C 5 8 (H) 01/05/2022       Recent Labs     01/08/22  1046 01/08/22  1747 01/09/22  0719 01/09/22  1229   POCGLU 220* 135 174* 236*       Blood Sugar Average: Last 72 hrs:  (P) 646 [id number]   · Monitor on Accu-Cheks  · Diabetic diet  · Hold metformin and Tradjenta  · Given A1c of 5 8, would discontinue Tradjenta on discharge and continue metformin alone

## 2022-01-09 NOTE — [facility] ADMISSION
Spoke with patient's daughter, Cristino, regarding support/assistance for patient at time of discharge from rehab  Updated information with [facility] physician, who recommended SNF rehab at this time, as patient will likely not be able to discharge to home in short period of time (ie  10-14 days)  CM has been updated

## 2022-01-09 NOTE — ASSESSMENT & PLAN NOTE
· Abnormal UA with reported new onset urinary frequency during admission  · UCx reviewed  · Received 3 doses of IV ceftriaxone  This is noted to be E coli and is susceptible to Keflex  · Continue Keflex for 1 additional dose to finish treatment

## 2022-01-09 NOTE — ASSESSMENT & PLAN NOTE
· History of lung cancer, metastatic to bone- on chemotherapy every 3 weeks being managed by Dr Norma Espana  · Patient may require a change in her regimen due to acute stroke  · Prior discussions were had with patient's daughter that chemotherapy would not be given while at rehab

## 2022-01-09 NOTE — PROGRESS NOTES
[facility]  Progress Note Soni Hall 1950, 66 y o  female MRN: [id number]  Unit/Bed#: S -01 Encounter: [id number]  Primary Care Provider: Lizzy Harding DO   Date and time admitted to hospital: 1/3/2022  7:30 PM    * Acute ischemic stroke [facility])  Assessment & Plan  · Patient's initial reason for admission was multiple falls and intermittent confusion  · CTH negative  · MRI: Right basal ganglia and right caudate acute ischemia  · Repeat MRI of the brain with gadolinium and MRA imaging both noted to be unremarkable  · Continue Lipitor  · Echo unremarkable  · Patient with known h/o Afib  · Continue Eliquis AC  Added aspirin per her oncologist Dr Torin Mujica for concern of thrombosis caused by chemotherapy  · PT/OT narendra noted recommending rehab - [facility] refused 1/9 and now awaiting other facilities  · No longer need permissive HTN - BP now 150s    Encephalopathy  Assessment & Plan  · Fluctuating mental status issues during hospital stay  · Patient on 1/8/2022 and 1/9/22 is noted to be alert, awake, oriented and following commands  · Patient now eating meals  · Likely related to underlying CVA and possible hospital-acquired delirium however now improving  · EEG with nonspecific slowing  · COVID negative x2  · No evidence of bleeding or change on repeat head CT    Cystitis  Assessment & Plan  · Abnormal UA with reported new onset urinary frequency during admission  · UCx reviewed  · Received 3 doses of IV ceftriaxone  This is noted to be E coli and is susceptible to Keflex  · Continue Keflex for 1 additional dose to finish treatment      Urinary retention  Assessment & Plan  · New onset urinary retention, required CIC x 4  · Bonner catheter was placed on 1/7/2022  · Voiding trial in approximately 1 week  · Outpatient urology consult  · Ensure adequate bowel regimen - had BM 1/9/22 AM    Lung cancer ([address] )  Assessment & Plan  · History of lung cancer, metastatic to bone- on chemotherapy every 3 weeks being managed by Dr Toyin Haas  · Patient may require a change in her regimen due to acute stroke  · Prior discussions were had with patient's daughter that chemotherapy would not be given while at rehab  Chronic kidney disease, stage 3 [facility])  Assessment & Plan  Lab Results   Component Value Date    EGFR 37 01/07/2022    EGFR 31 01/06/2022    EGFR 40 01/05/2022    CREATININE 1 40 (H) 01/07/2022    CREATININE 1 59 (H) 01/06/2022    CREATININE 1 30 01/05/2022     · Creatinine remains within her baseline  · Avoid nephrotoxic agents  · Resumed oral diuretics    Multiple falls  Assessment & Plan  · Patient reported to have multiple falls (and reports of confusion and also with headaches/fatigue recently); abnormal MRI of the brain see above  Sustained mild contusions/buttock hematoma  · Head CT unremarkable  · PT/OT re-evaluation appreciated, now being recommended for rehab instead of home with [facility]NA  · Acute rehab center feels patient not a candidate for their facility  · Await SNF level of rehab    Hypertension  Assessment & Plan  · Initially treated with permissive HTN in acute CVA   · Now on Norvasc 5 mg p o  B i d  And blood pressure currently 485 systolic  · Resume lasix    Ground glass opacity present on imaging of lung  Assessment & Plan  · Abnormal CT with ground-glass opacities in bilateral lower lobes  · Patient tested negative for COVID on admission and reported no respiratory complaints        Atrial fibrillation ([address] )  Assessment & Plan  · Continue home meds including sotalol, metoprolol and Eliquis    Diabetes type 2, controlled [facility])  Assessment & Plan  Lab Results   Component Value Date    HGBA1C 5 8 (H) 01/05/2022       Recent Labs     01/08/22  1046 01/08/22  1747 01/09/22  0719 01/09/22  1229   POCGLU 220* 135 174* 236*       Blood Sugar Average: Last 72 hrs:  (P) 832 [id number]   · Monitor on Accu-Cheks  · Diabetic diet  · Hold metformin and Tradjenta  · Given A1c of 5 8, would discontinue Tradjenta on discharge and continue metformin alone  Hypokalemia  Assessment & Plan  · Noted to be 3 4 and repleted      VTE Pharmacologic Prophylaxis: VTE Score: 4 Moderate Risk (Score 3-4) - Pharmacological DVT Prophylaxis Ordered: apixaban (Eliquis)  Patient Centered Rounds: I performed bedside rounds with nursing staff today  Discussions with Specialists or Other Care Team Provider: nursing    Education and Discussions with Family / Patient: Updated  (daughter) via phone  Time Spent for Care: 45 minutes  More than 50% of total time spent on counseling and coordination of care as described above  Current Length of Stay: 5 day(s)  Current Patient Status: Inpatient   Certification Statement: The patient will continue to require additional inpatient hospital stay due to awaiting rehab placement  Discharge Plan: Anticipate discharge tomorrow to rehab facility  Code Status: Level 3 - DNAR and DNI    Subjective:   Had BM this AM in bedside commode  Eating and drinking well  Wants to go to [address] [place] [address] for [facility]    Objective:     Vitals:   Temp (24hrs), Av °F (36 7 °C), Min:97 8 °F (36 6 °C), Max:98 3 °F (36 8 °C)    Temp:  [97 8 °F (36 6 °C)-98 3 °F (36 8 °C)] 98 °F (36 7 °C)  HR:  [63] 63  Resp:  [18] 18  BP: (152-178)/(60-76) 154/60  SpO2:  [94 %-95 %] 95 %  Body mass index is 30 55 kg/m²  Input and Output Summary (last 24 hours): Intake/Output Summary (Last 24 hours) at 2022 1510  Last data filed at 2022 1350  Gross per 24 hour   Intake 330 ml   Output 1475 ml   Net -1145 ml       Physical Exam:   Physical Exam  Vitals and nursing note reviewed  Constitutional:       General: She is not in acute distress  Appearance: Normal appearance  She is ill-appearing  She is not diaphoretic  HENT:      Head: Normocephalic and atraumatic  Mouth/Throat:      Mouth: Mucous membranes are dry     Cardiovascular:      Rate and Rhythm: Normal rate and regular rhythm  Pulmonary:      Effort: Pulmonary effort is normal       Breath sounds: Normal breath sounds  No stridor  No wheezing, rhonchi or rales  Abdominal:      General: Bowel sounds are normal       Palpations: Abdomen is soft  There is no mass  Tenderness: There is no abdominal tenderness  There is no guarding  Comments: On commode having BM when I evaluated patient  Bonner in place   Musculoskeletal:      Right lower leg: No edema  Left lower leg: No edema  Skin:     General: Skin is warm and dry  Coloration: Skin is pale  Neurological:      Mental Status: She is alert  Comments: Oriented to person, place, time, president  Follows commands  Speech clear     Psychiatric:         Mood and Affect: Mood normal          Behavior: Behavior normal         Additional Data:    Labs:  Results from last 7 days   Lab Units 01/07/22  0542   WBC Thousand/uL 12 51*   HEMOGLOBIN g/dL 11 5   HEMATOCRIT % 36 0   PLATELETS Thousands/uL 167   NEUTROS PCT % 82*   LYMPHS PCT % 7*   MONOS PCT % 9   EOS PCT % 0     Results from last 7 days   Lab Units 01/07/22  0542   SODIUM mmol/L 141   POTASSIUM mmol/L 3 4*   CHLORIDE mmol/L 105   CO2 mmol/L 27   BUN mg/dL 15   CREATININE mg/dL 1 40*   ANION GAP mmol/L 9   CALCIUM mg/dL 8 9   ALBUMIN g/dL 2 6*   TOTAL BILIRUBIN mg/dL 0 61   ALK PHOS U/L 97   ALT U/L 23   AST U/L 28   GLUCOSE RANDOM mg/dL 133     Results from last 7 days   Lab Units 01/03/22  1940   INR  1 13     Results from last 7 days   Lab Units 01/09/22  1229 01/09/22  0719 01/08/22  1747 01/08/22  1046 01/08/22  0711 01/07/22  2128 01/07/22  1651 01/07/22  1057 01/07/22  0738 01/06/22  2105 01/06/22  1557 01/06/22  1109   POC GLUCOSE mg/dl 236* 174* 135 220* 168* 213* 128 256* 151* 245* 188* 202*     Results from last 7 days   Lab Units 01/05/22  0644   HEMOGLOBIN A1C % 5 8*           Lines/Drains:  Invasive Devices  Report    Peripheral Intravenous Line            Peripheral IV 01/09/22 Left Wrist <1 day          Drain            Urethral Catheter Double-lumen 16 Fr  1 day              Urinary Catheter:  Goal for removal: N/A- Discharging with Bonner               Imaging: No pertinent imaging reviewed  Recent Cultures (last 7 days):   Results from last 7 days   Lab Units 01/04/22  1449   URINE CULTURE  >100,000 cfu/ml Escherichia coli*       Last 24 Hours Medication List:   Current Facility-Administered Medications   Medication Dose Route Frequency Provider Last Rate    acetaminophen  650 mg Oral Q6H PRN EDUARDO Mckoy      amLODIPine  5 mg Oral BID Palmira Clifton PA-C      apixaban  5 mg Oral BID Joey Malini, EDUARDO      aspirin  81 mg Oral Daily Palmira Clifton PA-C      atorvastatin  80 mg Oral Daily With DocuSignAUREA      busPIRone  7 5 mg Oral BID Joey EDUARDO Nayak      cephalexin  500 mg Oral Q6H [address] Sally Smoker, AUREA      cholecalciferol  2,000 Units Oral Daily Joey Solelisa, EDUARDO      cyanocobalamin  100 mcg Oral Daily Augusto Solian, EDUARDO      docusate sodium  100 mg Oral BID PRN Sally Smoker, AUREA      folic acid  1 mg Oral Daily Joey SolEDUARDO pérez      hydrALAZINE  10 mg Intravenous Q6H PRN Padmini Harrell MD      insulin lispro  1-6 Units Subcutaneous TID AC JoeyEDUARDO Ritter      loratadine  10 mg Oral Daily Augusto Solian, EDUARDO      losartan  50 mg Oral Daily Luke Eldridge MD      melatonin  6 mg Oral HS Jackeline Shipman PA-C      metoprolol tartrate  25 mg Oral Q12H [address] JoeyEDUARDO Ritter      pantoprazole  40 mg Oral Early Morning Joey Solelisa, EDUARDO      prochlorperazine  10 mg Oral Q12H PRN Ojey Malini, EDUARDO      sotalol  80 mg Oral BID Augusto Malini, EDUARDO      vancomycin  125 mg Oral Q12H [address] Palmira Clifton PA-C      venlafaxine  37 5 mg Oral HS EDUARDO Mckoy          Today, Patient Was Seen By: Robin Luis PA-C    **Please Note: This note may have been constructed using a voice recognition system  **

## 2022-01-09 NOTE — PLAN OF CARE
Problem: MOBILITY - ADULT  Goal: Maintain or return to baseline ADL function  Description: INTERVENTIONS:  -  Assess patient's ability to carry out ADLs; assess patient's baseline for ADL function and identify physical deficits which impact ability to perform ADLs (bathing, care of mouth/teeth, toileting, grooming, dressing, etc )  - Assess/evaluate cause of self-care deficits   - Assess range of motion  - Assess patient's mobility; develop plan if impaired  - Assess patient's need for assistive devices and provide as appropriate  - Encourage maximum independence but intervene and supervise when necessary  - Involve family in performance of ADLs  - Assess for home care needs following discharge   - Consider OT consult to assist with ADL evaluation and planning for discharge  - Provide patient education as appropriate  Outcome: Progressing  Goal: Maintains/Returns to pre admission functional level  Description: INTERVENTIONS:  - Perform BMAT or MOVE assessment daily    - Set and communicate daily mobility goal to care team and patient/family/caregiver     - Collaborate with rehabilitation services on mobility goals if consulted    - Out of bed for toileting  - Record patient progress and toleration of activity level   Outcome: Progressing     Problem: Prexisting or High Potential for Compromised Skin Integrity  Goal: Skin integrity is maintained or improved  Description: INTERVENTIONS:  - Identify patients at risk for skin breakdown  - Assess and monitor skin integrity  - Assess and monitor nutrition and hydration status  - Monitor labs   - Assess for incontinence   - Turn and reposition patient  - Assist with mobility/ambulation  - Relieve pressure over bony prominences  - Avoid friction and shearing  - Provide appropriate hygiene as needed including keeping skin clean and dry  - Evaluate need for skin moisturizer/barrier cream  - Collaborate with interdisciplinary team   - Patient/family teaching  - Consider wound care consult   Outcome: Progressing     Problem: Potential for Falls  Goal: Patient will remain free of falls  Description: INTERVENTIONS:  - Educate patient/family on patient safety including physical limitations  - Instruct patient to call for assistance with activity   - Consult OT/PT to assist with strengthening/mobility   - Keep Call bell within reach  - Keep bed low and locked with side rails adjusted as appropriate  - Keep care items and personal belongings within reach  - Initiate and maintain comfort rounds  - Make Fall Risk Sign visible to staff    - Apply yellow socks and bracelet for high fall risk patients  - Consider moving patient to room near nurses station  Outcome: Progressing     Problem: Nutrition/Hydration-ADULT  Goal: Nutrient/Hydration intake appropriate for improving, restoring or maintaining nutritional needs  Description: Monitor and assess patient's nutrition/hydration status for malnutrition  Collaborate with interdisciplinary team and initiate plan and interventions as ordered  Monitor patient's weight and dietary intake as ordered or per policy  Utilize nutrition screening tool and intervene as necessary  Determine patient's food preferences and provide high-protein, high-caloric foods as appropriate       INTERVENTIONS:  - Monitor oral intake, urinary output, labs, and treatment plans  - Assess nutrition and hydration status and recommend course of action  - Evaluate amount of meals eaten  - Assist patient with eating if necessary   - Allow adequate time for meals  - Recommend/ encourage appropriate diets, oral nutritional supplements, and vitamin/mineral supplements  - Order, calculate, and assess calorie counts as needed  - Recommend, monitor, and adjust tube feedings and TPN/PPN based on assessed needs  - Assess need for intravenous fluids  - Provide specific nutrition/hydration education as appropriate  - Include patient/family/caregiver in decisions related to nutrition  Outcome: Progressing

## 2022-01-09 NOTE — ASSESSMENT & PLAN NOTE
· Patient's initial reason for admission was multiple falls and intermittent confusion  · CTH negative  · MRI: Right basal ganglia and right caudate acute ischemia  · Repeat MRI of the brain with gadolinium and MRA imaging both noted to be unremarkable  · Continue Lipitor  · Echo unremarkable  · Patient with known h/o Afib  · Continue Eliquis AC   Added aspirin per her oncologist Dr Sophie Sánchez for concern of thrombosis caused by chemotherapy  · PT/OT evals noted recommending rehab - [facility] refused 1/9 and now awaiting other facilities  · No longer need permissive HTN - BP now 150s

## 2022-01-09 NOTE — TELEPHONE ENCOUNTER
Patient daughter Lalito Mustafa called with questions on her mothers chemotherapy she would like a call back at [phone]

## 2022-01-09 NOTE — ASSESSMENT & PLAN NOTE
· Patient reported to have multiple falls (and reports of confusion and also with headaches/fatigue recently); abnormal MRI of the brain see above    Sustained mild contusions/buttock hematoma  · Head CT unremarkable  · PT/OT re-evaluation appreciated, now being recommended for rehab instead of home with [facility]  · Acute rehab center feels patient not a candidate for their facility  · Await SNF level of rehab

## 2022-01-09 NOTE — ASSESSMENT & PLAN NOTE
Lab Results   Component Value Date    EGFR 37 01/07/2022    EGFR 31 01/06/2022    EGFR 40 01/05/2022    CREATININE 1 40 (H) 01/07/2022    CREATININE 1 59 (H) 01/06/2022    CREATININE 1 30 01/05/2022     · Creatinine remains within her baseline  · Avoid nephrotoxic agents  · Resumed oral diuretics

## 2022-01-09 NOTE — ASSESSMENT & PLAN NOTE
· New onset urinary retention, required CIC x 4  · Bonner catheter was placed on 1/7/2022  · Voiding trial in approximately 1 week  · Outpatient urology consult  · Ensure adequate bowel regimen - had BM 1/9/22 AM

## 2022-01-09 NOTE — PHYSICAL THERAPY NOTE
PHYSICAL THERAPY NOTE    Patient Name: Cole Huerta  FPIJR'R Date: 22 0858   PT Last Visit   PT Visit Date 22   Note Type   Note Type Treatment   Pain Assessment   Pain Assessment Tool 0-10   Pain Score No Pain   Restrictions/Precautions   Weight Bearing Precautions Per Order No   Other Precautions Cognitive; Chair Alarm; Bed Alarm; Fall Risk   General   Chart Reviewed Yes   Response to Previous Treatment Patient with no complaints from previous session  Family/Caregiver Present No   Cognition   Overall Cognitive Status Impaired   Arousal/Participation Alert; Cooperative   Attention Attends with cues to redirect   Comments Pt identified by name and   Subjective   Subjective Agrees to PT treatment and is pleasant and cooperative throughout session  "Adriana French! I can't believe it's  "   Bed Mobility   Supine to Sit Unable to assess  (OOB in chair pre/post session with alarm intact)   Transfers   Sit to Stand 3  Moderate assistance   Additional items Assist x 1; Increased time required;Verbal cues;Armrests  (hand placement)   Stand to Sit 4  Minimal assistance   Additional items Assist x 1; Increased time required;Verbal cues;Armrests  (hand placement)   Additional Comments Pt able to perform x5 sit to stand transfers consecutively with min/mod A x1 from recliner chair in order to increase functional mobility and improve LE strength  Requires consistent cues for hand placement  (Unsupportive sitting in recliner for ~11`  )   Ambulation/Elevation   Gait pattern Decreased foot clearance; Forward Flexion; Short stride; Excessively slow   Gait Assistance 4  Minimal assist   Additional items Assist x 1;Verbal cues   Assistive Device Rolling walker   Distance 12` x1, 25` x1, and 40` x1  (seated rest breaks)   Balance   Static Sitting Fair   Dynamic Sitting Fair -   Static Standing Fair -   Dynamic Standing Fair -   Ambulatory Poor +   Endurance Deficit   Endurance Deficit Yes Endurance Deficit Description limited ambulation distance, gait degradation   Activity Tolerance   Activity Tolerance Patient limited by fatigue;Patient tolerated treatment well   Nurse Made Aware Per RN, pt appropriate to treat   Exercises   Knee AROM Long Arc Quad Sitting;20 reps;AROM; Bilateral  (x2)   Ankle Pumps Sitting;20 reps;AROM; Bilateral  (x2)   Marching Sitting;20 reps;AROM; Bilateral  (x2)   Assessment   Prognosis Fair   Problem List Decreased strength;Decreased endurance; Impaired balance;Decreased mobility; Decreased cognition;Decreased safety awareness; Obesity   Assessment Pt agrees to PT treatment and is pleasant and cooperative throughout session  Decreased confusion noted, however appears to repeat actions and require cueing to move on to next task  Pt demonstrates this by brushing her teeth for ~8` and blowing her nose for ~3`, requiring cueing to rinse her mouth and throw away the tissue  Able to follow commands well with increased time  Progress noted with most functional mobility as pt is able to ambulate increased distance with decreased level of assist and no knee buckling  Overall activity tolerance also improved since last session  Pt is very cooperative and motivated and would make a great rehab candidate  Will continue to benefit from ongoing skilled PT to maximize her functional mobility and increase her level of independence  Goals   Patient Goals to go to rehab   STG Expiration Date 01/17/22   PT Treatment Day 1   Plan   Treatment/Interventions Functional transfer training;LE strengthening/ROM; Therapeutic exercise; Endurance training;Patient/family training;Equipment eval/education; Bed mobility;Gait training;Cognitive reorientation   Progress Progressing toward goals   PT Frequency   (4-5x/wk)   Recommendation   PT Discharge Recommendation Post acute rehabilitation services   Equipment Recommended [facility] Recommended Wheeled walker   AM-PAC Basic Mobility Inpatient   Turning in Bed Without Bedrails 3   Lying on Back to Sitting on Edge of Flat Bed 3   Moving Bed to Chair 2   Standing Up From Chair 2   Walk in Room 3   Climb 3-5 Stairs 2   Basic Mobility Inpatient Raw Score 15   Basic Mobility Standardized Score 36 97   Highest Level Of Mobility   -[facility] Goal 4: Move to chair/commode   [facility] Highest Level of Mobility 7: Walk 25 feet or more   -[facility] Goal Achieved Yes   Education   Education Provided Mobility training;Home exercise program;Assistive device   Patient Reinforcement needed   The patient's AM-PAC Basic Mobility Inpatient Short Form Raw Score is 15  A Raw score of less than or equal to 16 suggests the patient may benefit from discharge to post-acute rehabilitation services  Please also refer to the recommendation of the Physical Therapist for safe discharge planning      Ethan Martinez, PT,DPT

## 2022-01-09 NOTE — ASSESSMENT & PLAN NOTE
· Initially treated with permissive HTN in acute CVA   · Now on Norvasc 5 mg p o  B i d   And blood pressure currently 533 systolic  · Resume lasix

## 2022-01-09 NOTE — PLAN OF CARE
Problem: PHYSICAL THERAPY ADULT  Goal: Performs mobility at highest level of function for planned discharge setting  See evaluation for individualized goals  Description: Treatment/Interventions: Functional transfer training,LE strengthening/ROM,Therapeutic exercise,Endurance training,Cognitive reorientation,Patient/family training,Equipment eval/education,Bed mobility,Gait training  Equipment Recommended: Merlin Sovereign       See flowsheet documentation for full assessment, interventions and recommendations  Outcome: Progressing  Note: Prognosis: Fair  Problem List: Decreased strength,Decreased endurance,Impaired balance,Decreased mobility,Decreased cognition,Decreased safety awareness,Obesity  Assessment: Pt agrees to PT treatment and is pleasant and cooperative throughout session  Decreased confusion noted, however appears to repeat actions and require cueing to move on to next task  Pt demonstrates this by brushing her teeth for ~8` and blowing her nose for ~3`, requiring cueing to rinse her mouth and throw away the tissue  Able to follow commands well with increased time  Progress noted with most functional mobility as pt is able to ambulate increased distance with decreased level of assist and no knee buckling  Overall activity tolerance also improved since last session  Pt is very cooperative and motivated and would make a great rehab candidate  Will continue to benefit from ongoing skilled PT to maximize her functional mobility and increase her level of independence  PT Discharge Recommendation: (S) Post acute rehabilitation services          See flowsheet documentation for full assessment

## 2022-01-10 ENCOUNTER — TELEPHONE (OUTPATIENT)
Dept: HEMATOLOGY ONCOLOGY | Facility: CLINIC | Age: 72
End: 2022-01-10

## 2022-01-10 PROBLEM — N30.90 CYSTITIS: Status: RESOLVED | Noted: 2022-01-04 | Resolved: 2022-01-10

## 2022-01-10 LAB
ANION GAP SERPL CALCULATED.3IONS-SCNC: 9 MMOL/L (ref 4–13)
BUN SERPL-MCNC: 27 MG/DL (ref 5–25)
CALCIUM SERPL-MCNC: 9.6 MG/DL (ref 8.3–10.1)
CHLORIDE SERPL-SCNC: 101 MMOL/L (ref 100–108)
CO2 SERPL-SCNC: 27 MMOL/L (ref 21–32)
CREAT SERPL-MCNC: 1.55 MG/DL (ref 0.6–1.3)
GFR SERPL CREATININE-BSD FRML MDRD: 32 ML/MIN/1.73SQ M
GLUCOSE SERPL-MCNC: 157 MG/DL (ref 65–140)
GLUCOSE SERPL-MCNC: 167 MG/DL (ref 65–140)
GLUCOSE SERPL-MCNC: 180 MG/DL (ref 65–140)
GLUCOSE SERPL-MCNC: 206 MG/DL (ref 65–140)
GLUCOSE SERPL-MCNC: 216 MG/DL (ref 65–140)
POTASSIUM SERPL-SCNC: 3.9 MMOL/L (ref 3.5–5.3)
SODIUM SERPL-SCNC: 137 MMOL/L (ref 136–145)

## 2022-01-10 PROCEDURE — 97116 GAIT TRAINING THERAPY: CPT

## 2022-01-10 PROCEDURE — 80048 BASIC METABOLIC PNL TOTAL CA: CPT | Performed by: PHYSICIAN ASSISTANT

## 2022-01-10 PROCEDURE — 99232 SBSQ HOSP IP/OBS MODERATE 35: CPT | Performed by: PHYSICIAN ASSISTANT

## 2022-01-10 PROCEDURE — 82948 REAGENT STRIP/BLOOD GLUCOSE: CPT

## 2022-01-10 PROCEDURE — 97535 SELF CARE MNGMENT TRAINING: CPT

## 2022-01-10 PROCEDURE — 97110 THERAPEUTIC EXERCISES: CPT

## 2022-01-10 RX ORDER — LOSARTAN POTASSIUM 50 MG/1
50 TABLET ORAL DAILY
Status: DISCONTINUED | OUTPATIENT
Start: 2022-01-11 | End: 2022-01-11 | Stop reason: HOSPADM

## 2022-01-10 RX ORDER — LOSARTAN POTASSIUM 50 MG/1
100 TABLET ORAL DAILY
Status: DISCONTINUED | OUTPATIENT
Start: 2022-01-11 | End: 2022-01-10

## 2022-01-10 RX ORDER — LOSARTAN POTASSIUM 50 MG/1
50 TABLET ORAL ONCE
Status: DISCONTINUED | OUTPATIENT
Start: 2022-01-10 | End: 2022-01-10

## 2022-01-10 RX ADMIN — Medication 125 MG: at 21:10

## 2022-01-10 RX ADMIN — METOPROLOL TARTRATE 25 MG: 25 TABLET, FILM COATED ORAL at 21:08

## 2022-01-10 RX ADMIN — Medication 125 MG: at 10:08

## 2022-01-10 RX ADMIN — FOLIC ACID 1 MG: 1 TABLET ORAL at 08:49

## 2022-01-10 RX ADMIN — BUSPIRONE HYDROCHLORIDE 7.5 MG: 5 TABLET ORAL at 21:08

## 2022-01-10 RX ADMIN — VITAM B12 100 MCG: 100 TAB at 08:48

## 2022-01-10 RX ADMIN — Medication 6 MG: at 21:07

## 2022-01-10 RX ADMIN — VENLAFAXINE HYDROCHLORIDE 37.5 MG: 37.5 CAPSULE, EXTENDED RELEASE ORAL at 21:07

## 2022-01-10 RX ADMIN — LORATADINE 10 MG: 10 TABLET ORAL at 08:49

## 2022-01-10 RX ADMIN — Medication 2000 UNITS: at 08:47

## 2022-01-10 RX ADMIN — METOPROLOL TARTRATE 25 MG: 25 TABLET, FILM COATED ORAL at 08:46

## 2022-01-10 RX ADMIN — SOTALOL HYDROCHLORIDE 80 MG: 80 TABLET ORAL at 17:57

## 2022-01-10 RX ADMIN — INSULIN LISPRO 1 UNITS: 100 INJECTION, SOLUTION INTRAVENOUS; SUBCUTANEOUS at 17:57

## 2022-01-10 RX ADMIN — ASPIRIN 81 MG: 81 TABLET, COATED ORAL at 08:47

## 2022-01-10 RX ADMIN — PANTOPRAZOLE SODIUM 40 MG: 40 TABLET, DELAYED RELEASE ORAL at 05:50

## 2022-01-10 RX ADMIN — FUROSEMIDE 40 MG: 40 TABLET ORAL at 08:49

## 2022-01-10 RX ADMIN — INSULIN LISPRO 2 UNITS: 100 INJECTION, SOLUTION INTRAVENOUS; SUBCUTANEOUS at 13:17

## 2022-01-10 RX ADMIN — INSULIN LISPRO 1 UNITS: 100 INJECTION, SOLUTION INTRAVENOUS; SUBCUTANEOUS at 08:46

## 2022-01-10 RX ADMIN — AMLODIPINE BESYLATE 5 MG: 5 TABLET ORAL at 08:47

## 2022-01-10 RX ADMIN — LOSARTAN POTASSIUM 50 MG: 50 TABLET, FILM COATED ORAL at 08:46

## 2022-01-10 RX ADMIN — APIXABAN 5 MG: 5 TABLET, FILM COATED ORAL at 08:47

## 2022-01-10 RX ADMIN — ATORVASTATIN CALCIUM 80 MG: 40 TABLET, FILM COATED ORAL at 17:57

## 2022-01-10 RX ADMIN — APIXABAN 5 MG: 5 TABLET, FILM COATED ORAL at 17:57

## 2022-01-10 RX ADMIN — AMLODIPINE BESYLATE 5 MG: 5 TABLET ORAL at 21:08

## 2022-01-10 RX ADMIN — SOTALOL HYDROCHLORIDE 80 MG: 80 TABLET ORAL at 08:46

## 2022-01-10 RX ADMIN — BUSPIRONE HYDROCHLORIDE 7.5 MG: 5 TABLET ORAL at 08:47

## 2022-01-10 NOTE — ASSESSMENT & PLAN NOTE
· History of lung cancer, metastatic to bone- on chemotherapy every 3 weeks being managed by Dr Zhen Mondragon  · Patient may require a change in her regimen due to acute stroke  · Prior discussions were had with patient's daughter that chemotherapy would not be given while at rehab

## 2022-01-10 NOTE — ASSESSMENT & PLAN NOTE
· Patient's initial reason for admission was multiple falls and intermittent confusion  · CTH negative  · MRI: Right basal ganglia and right caudate acute ischemia  · Repeat MRI of the brain with gadolinium and MRA imaging both noted to be unremarkable  · Continue Lipitor  · Echo unremarkable  · Patient with known h/o Afib  · Continue Eliquis AC   Added aspirin per her oncologist Dr Washington Aguirre for concern of thrombosis caused by chemotherapy  · PT/OT evals noted recommending rehab - [facility] refused 1/9 and now awaiting other facilities  · No longer need permissive HTN - BP now 150s

## 2022-01-10 NOTE — ASSESSMENT & PLAN NOTE
· Abnormal UA with reported new onset urinary frequency during admission  · UCx reviewed  · Received 3 doses of IV ceftriaxone  This is noted to be E coli and is susceptible to Keflex    · Received 2 more days of Keflex and full course is now complete

## 2022-01-10 NOTE — PLAN OF CARE
Problem: PHYSICAL THERAPY ADULT  Goal: Performs mobility at highest level of function for planned discharge setting  See evaluation for individualized goals  Description: Treatment/Interventions: Functional transfer training,LE strengthening/ROM,Therapeutic exercise,Endurance training,Cognitive reorientation,Patient/family training,Equipment eval/education,Bed mobility,Gait training  Equipment Recommended: Maritza       See flowsheet documentation for full assessment, interventions and recommendations  Outcome: Progressing  Note: Prognosis: Fair  Problem List: Decreased strength,Decreased endurance,Impaired balance,Decreased mobility,Decreased cognition,Decreased safety awareness,Obesity  Assessment: pt began tx session seated OOB in the recliner and was agreeable to participate in PT intervention  pt was able to complete 3 STS from recliner w/ VC's for hand placement while ascending to RW and descending back into recliner in order to strengthen LE's and increase endurance and safety with all functional transfers  Slight progress was noted as pt completed all functional transfers in todays tx session with min Ax1 and VC's  pt was able to increase ambulation tolerance and distance in todays tx session by ambulating 60'x2 RW and remains consistant with min Ax1 for balance and safety  pt would benefit from continued focus on OoB mobility with progression of ambulation distance  continue to recommend post acute rehab services at the time of D/C in order to maximize pt functional independence and safety w/ all OOB mobility  post tx session pt seated OOB in the recliner with call bell and all pt needs met            PT Discharge Recommendation: (S) Post acute rehabilitation services          See flowsheet documentation for full assessment

## 2022-01-10 NOTE — ASSESSMENT & PLAN NOTE
Lab Results   Component Value Date    HGBA1C 5 8 (H) 01/05/2022       Recent Labs     01/09/22  0719 01/09/22  1229 01/09/22  1601 01/10/22  0635   POCGLU 174* 236* 178* 167*       Blood Sugar Average: Last 72 hrs:  (P) 184 [id number]   · Monitor on Accu-Cheks  · Diabetic diet  · Hold metformin and Tradjenta  · Given A1c of 5 8, would discontinue Tradjenta on discharge and continue metformin alone

## 2022-01-10 NOTE — OCCUPATIONAL THERAPY NOTE
OT TREATMENT    The patient's raw score on the AM-PAC Daily Activity inpatient short form is 15, standardized score is 34 69, less than 39 4  Patients at this level are likely to benefit from DC to post-acute rehabilitation services  Please refer to the recommendation of the Occupational Therapist for safe DC planning  01/10/22 5030   OT Last Visit   OT Visit Date 01/10/22   Note Type   Note Type Treatment   Restrictions/Precautions   Weight Bearing Precautions Per Order No   Other Precautions Cognitive; Chair Alarm; Bed Alarm; Fall Risk;Multiple lines  (bahena )   General   Response to Previous Treatment Patient reporting fatigue but able to participate   Lifestyle   Autonomy Pt reports I w/ ADL/ IADL PTA / out use of AD or DME  Pt reports having access to AD, DME PRN   Reciprocal Relationships Pt reports living w/ disabled brother and cousing staying w/ them   Service to Others Pt reports retired and worked for [employer] as    Intrinsic Gratification Pt reports enjoying crafts and sewing   Pain Assessment   Pain Assessment Tool 0-10   Pain Score No Pain   ADL   Eating Assistance 5  Supervision/Setup   Eating Deficit Setup; Increased time to complete; Beverage management   Eating Comments Pt breakfast arrived at end of session  Pt requiring assist to open containers and setup tray for functional feeding  Grooming Assistance 5  Supervision/Setup   Grooming Deficit Setup; Wash/dry face;Verbal cueing;Supervision/safety; Increased time to complete   Grooming Comments Pt washed face seated EOB  Pt required increased cueing to complete task  Pt slow to respond to commands  UB Bathing Assistance 5  Supervision/Setup   UB Bathing Deficit Setup;Verbal cueing;Supervision/safety; Increased time to complete; Chest;Right arm;Left arm; Abdomen   UB Bathing Comments Pt to complete UB bathing seated EOB  Pt requiring increased time to process commands and required cueing to sequence activity   Pt able to maintain sitting balance during task  LB Bathing Assistance 3  Moderate Assistance   LB Bathing Deficit Setup;Steadying;Verbal cueing;Supervision/safety; Increased time to complete;Perineal area; Buttocks;Right upper leg;Left upper leg;Right lower leg including foot; Left lower leg including foot   LB Bathing Comments Pt to complete LB bathing sitting EOB  Pt able to wash upper thighs and slightly below knees but unable to reach feet or perform perineal hygiene in stance  Pt requiring mod a x 1 to stand and max assist to complete perineal hygiene  Pt requiring increased cueing to complete LB bathing  UB Dressing Assistance 4  Minimal Assistance   UB Dressing Deficit Setup;Steadying;Verbal cueing;Supervision/safety; Increased time to complete; Thread RUE; Thread LUE;Pull around back   UB Dressing Comments Pt to don new hospital gown and robe while seated EOB  Pt requiring increased time to process commands and increased verbal cueing for sequencing of tasks  Pt somewhat confused when handed robe and placed over lap as if it were a blanket  LB Dressing Assistance 2  Maximal Assistance   LB Dressing Deficit Setup;Verbal cueing;Supervision/safety; Increased time to complete; Don/doff R sock; Don/doff L sock   LB Dressing Comments Pt unable to don socks sitting EOB today, requiring max assist     Toileting Assistance  3  Moderate Assistance   Toileting Deficit Setup;Steadying;Verbal cueing;Supervison/safety; Increased time to complete; Bedside commode;Perineal hygiene   Toileting Comments Pt to void on commode at beginning of session  Pt requiring min/mod a x 1 to take few steps from EOB to commode with use of RW  Pt with BM on commode  Pt unable to perform perineal hygiene in stance, requiring mod a x 1 and increased verbal cueing to stand from commode and max a to complete perineal hygiene  Pt attempting to stand from commode x 4 with use of armrests but overall weak and deconditioned       Bed Mobility   Supine to Sit 3  Moderate assistance   Additional items Assist x 1; Increased time required;LE management;Verbal cues; Bedrails   Additional Comments Pt OOB to relciner at end of session  Transfers   Sit to Stand 3  Moderate assistance   Additional items Assist x 1; Increased time required;Verbal cues   Stand to Sit 4  Minimal assistance   Additional items Assist x 1; Increased time required;Verbal cues   Toilet transfer 3  Moderate assistance   Additional items Assist x 1; Increased time required;Verbal cues; Commode   Additional Comments Pt consistently requiring mod a x 1 to stand from various surfaces  Despite increased verbal cueing for hand placement and use of armrests on commode during toilet transfer, pt requiring increased assist to achieve standing position  Pt requiring increased trials to initiate standing movement during session, and benefits from cueing to lean forward and use momentum  Functional Mobility   Functional Mobility 4  Minimal assistance   Additional Comments Pt took few steps from EOB <> commode and then from EOB to recliner with use of RW and min/mod assist at times  Pt requiring increased assist when navigating turns and increased verbal cueing for spatial awareness  Pt attempting to sit in recliner when too far away from chair and requiring increased verbal cueing for safety  Pt also benefitting from increased cueing for safe walker management as pt often found with walker too far away from body  Additional items Rolling walker   Toilet Transfers   Toilet Transfer From [employer] Transfer Type To and from   Toilet Transfer to Standard bedside commode   Toilet Transfer Technique Ambulating   Toilet Transfers Moderate assistance   Cognition   Overall Cognitive Status Impaired   Arousal/Participation Cooperative;Arousable   Attention Attends with cues to redirect   Orientation Level Oriented to person;Oriented to place; Disoriented to time;Disoriented to situation   Memory Decreased recall of precautions;Decreased short term memory   Following Commands Follows one step commands with increased time or repetition   Comments Pt initially lethargic upon arrival but agreeable to session  Pt sometimes slow to respond and requiring increased time to process commands  Pt benefits from increased verbal cueing to sequence tasks and cueing during mobility for safety  Pt to benefit from further cognitive evaluation to determine safe discharge  Activity Tolerance   Activity Tolerance Patient limited by fatigue   Medical Staff Made Aware  Yes, RN ok to see pt  Assessment   Assessment Patient participated in Skilled OT session this date with interventions consisting of ADL re training with the use of correct body mechnaics, Energy Conservation techniques, Work simplification skills , safety awareness and fall prevention techniques,  therapeutic activities to: increase activity tolerance, increase dynamic sit/ stand balance during functional activity , increase postural control, increase trunk control and increase OOB/ sitting tolerance   Patient agreeable to OT treatment session, upon arrival patient was found supine in bed  In comparison to previous session, patient with improvements in participation in ADL's today  Patient still requiring mod/max assist for LB ADL's but with some improvement in attempts to improve performance  Patient still requiring significant assist to complete functional transfers, consistently requiring mod assist to achieve stance  Patient with increased cognition today and able to engage in full session but still requiring increased time to process command sand increased verbal cueing to sequence tasks and for safety during mobility and transfers  Patient requiring verbal cues for safety, verbal cues for correct technique, cognitive assistance to anticipate next step, one step directives and ocassional safety reminders   Patient continues to be functioning below baseline level, occupational performance remains limited secondary to factors listed above and increased risk for falls and injury  From OT standpoint, recommendation at time of d/c would be Post acute rehabilitation services  Patient to benefit from continued Occupational Therapy treatment while in the hospital to address deficits as defined above and maximize level of functional independence with ADLs and functional mobility  Plan   Treatment Interventions ADL retraining;Functional transfer training;UE strengthening/ROM; Endurance training;Cognitive reorientation;Patient/family training;Equipment evaluation/education; Compensatory technique education;Continued evaluation; Activityengagement   Goal Expiration Date 01/14/22   OT Treatment Day 2  (monday )   OT Frequency 3-5x/wk   Recommendation   OT Discharge Recommendation Post acute rehabilitation services   OT - OK to Discharge Yes  (Once medically cleared)   Additional Comments  At end of session, pt OOB to recliner with all needs met, call bell within reach, and breakfast tray setup     AM-PAC Daily Activity Inpatient   Lower Body Dressing 2   Bathing 2   Toileting 2   Upper Body Dressing 3   Grooming 3   Eating 3   Daily Activity Raw Score 15   Daily Activity Standardized Score (Calc for Raw Score >=11) 34 69   AM-PAC Applied Cognition Inpatient   Following a Speech/Presentation 2   Understanding Ordinary Conversation 3   Taking Medications 2   Remembering Where Things Are Placed or Put Away 3   Remembering List of 4-5 Errands 2   Taking Care of Complicated Tasks 2   Applied Cognition Raw Score 14   Applied Cognition Standardized Score 32 02     Jina Howell, OT

## 2022-01-10 NOTE — ASSESSMENT & PLAN NOTE
· Fluctuating mental status issues during hospital stay  · Likely related to underlying CVA and possible hospital-acquired delirium however now improving      · EEG with nonspecific slowing  · COVID negative x2  · No evidence of bleeding or change on repeat head CT

## 2022-01-10 NOTE — ASSESSMENT & PLAN NOTE
· New onset urinary retention, required CIC x 4  · Bonner catheter was placed on 1/7/2022  · Voiding trial in approximately 1 week  · Outpatient urology consult, amb referral in  · Ensure adequate bowel regimen - had BM 1/9/22 AM

## 2022-01-10 NOTE — PHYSICAL THERAPY NOTE
PHYSICAL THERAPY NOTE          Patient Name: Chapito CARL Date: 1/10/2022        01/10/22 1005   PT Last Visit   PT Visit Date 01/10/22   Note Type   Note Type Treatment   Pain Assessment   Pain Assessment Tool 0-10   Pain Score No Pain   Pain Location/Orientation Location: Head   Effect of Pain on Daily Activities limited activity tolerance    Patient's Stated Pain Goal No pain   Hospital Pain Intervention(s) Repositioned;Elevated; Emotional support; Rest   Multiple Pain Sites No   Pain Rating: FLACC (Rest) - Face 0   Pain Rating: FLACC (Rest) - Legs 0   Pain Rating: FLACC (Rest) - Activity 0   Pain Rating: FLACC (Rest) - Cry 0   Pain Rating: FLACC (Rest) - Consolability 0   Score: FLACC (Rest) 0   Pain Rating: FLACC (Activity) - Face 0   Pain Rating: FLACC (Activity) - Legs 0   Pain Rating: FLACC (Activity) - Activity 0   Pain Rating: FLACC (Activity) - Cry 0   Pain Rating: FLACC (Activity) - Consolability 0   Score: FLACC (Activity) 0   Restrictions/Precautions   Weight Bearing Precautions Per Order No   Other Precautions Cognitive; Chair Alarm; Bed Alarm; Fall Risk;Multiple lines  (bahena )   General   Chart Reviewed Yes   Response to Previous Treatment Patient with no complaints from previous session  Family/Caregiver Present No   Cognition   Overall Cognitive Status Impaired   Arousal/Participation Alert; Responsive; Cooperative   Attention Attends with cues to redirect   Orientation Level Oriented to person;Oriented to place; Disoriented to time;Disoriented to situation   Memory Decreased short term memory;Decreased recall of precautions   Following Commands Follows one step commands with increased time or repetition   Comments pt continued to require VC's throughout tx session for hand placement, redirecting pt towards completing task at hand and safety while advancing RW   Subjective   Subjective pt was agreeable to participate in PT intervention    Bed Mobility   Rolling R Unable to assess   Rolling L Unable to assess   Supine to Sit Unable to assess   Sit to Supine Unable to assess   Additional Comments pt seated OoB in the recliner pre/post tx session    Transfers   Sit to Stand 4  Minimal assistance   Additional items Assist x 1; Armrests; Increased time required;Verbal cues   Stand to Sit 4  Minimal assistance   Additional items Assist x 1; Armrests; Increased time required;Verbal cues   Stand pivot Unable to assess   Toilet transfer Unable to assess   Additional Comments pt continues to require RW to complete all functional transfers in todays tx session with constant VC's for hand placement while ascending to RW and descending back into recliner  Ambulation/Elevation   Gait pattern Decreased foot clearance; Foward flexed; Step to;Excessively slow   Gait Assistance 4  Minimal assist   Additional items Assist x 1;Verbal cues   Assistive Device Rolling walker   Distance 60'x2 RW   (seated therapeutic rest breaks in between ambulation trials )   Balance   Static Sitting Fair   Dynamic Sitting Fair -   Static Standing Fair -   Dynamic Standing Fair -   Ambulatory Poor +   Endurance Deficit   Endurance Deficit Yes   Endurance Deficit Description limited activity tolerance, ambulation distance and continued to demonstrate a gait deviation    Activity Tolerance   Activity Tolerance Patient limited by fatigue   Nurse Made Aware Spoke to RN Frieda Bussing    Exercises   Knee AROM Short Arc Quad Sitting;15 reps;AROM; Bilateral  (long sit position )   Knee AROM Long Arc Quad Sitting;20 reps;AROM; Bilateral   Ankle Pumps Sitting;20 reps;AROM; Bilateral   Marching Sitting;20 reps;AROM; Bilateral   Assessment   Prognosis Fair   Problem List Decreased strength;Decreased endurance; Impaired balance;Decreased mobility; Decreased cognition;Decreased safety awareness; Obesity   Assessment pt began tx session seated OOB in the recliner and was agreeable to participate in PT intervention  pt was able to complete 3 STS from recliner w/ VC's for hand placement while ascending to RW and descending back into recliner in order to strengthen LE's and increase endurance and safety with all functional transfers  Slight progress was noted as pt completed all functional transfers in todays tx session with min Ax1 and VC's  pt was able to increase ambulation tolerance and distance in todays tx session by ambulating 60'x2 RW and remains consistant with min Ax1 for balance and safety  pt would benefit from continued focus on OoB mobility with progression of ambulation distance  continue to recommend post acute rehab services at the time of D/C in order to maximize pt functional independence and safety w/ all OOB mobility  post tx session pt seated OOB in the recliner with call bell and all pt needs met    Goals   Patient Goals to take a nap    STG Expiration Date 01/17/22   PT Treatment Day 2   Plan   Treatment/Interventions Functional transfer training;LE strengthening/ROM; Therapeutic exercise; Endurance training;Cognitive reorientation;Patient/family training;Equipment eval/education; Bed mobility;Gait training;Spoke to nursing   Progress Progressing toward goals   PT Frequency Other (Comment)  (4-5x week )   Recommendation   PT Discharge Recommendation Post acute rehabilitation services   Equipment Recommended [facility] Recommended Wheeled walker   Change/add to Mango-Mate? No   AM-PAC Basic Mobility Inpatient   Turning in Bed Without Bedrails 3   Lying on Back to Sitting on Edge of Flat Bed 3   Moving Bed to Chair 3   Standing Up From Chair 3   Walk in Room 3   Climb 3-5 Stairs 2   Basic Mobility Inpatient Raw Score 17   Basic Mobility Standardized Score 39 67   Highest Level Of Mobility   [facility] Goal 5: Stand one or more mins   The patient's AM-PAC Basic Mobility Inpatient Short Form Raw Score is 17   A Raw score of greater than 16 suggests the patient may benefit from discharge to home  Please also refer to the recommendation of the Physical Therapist for safe discharge planning  Pt continues to be limited with activity tolerance and ambulation distance   Pt continues to require VC's for hand placement for safety while ascending to  and descending back into recliner     Katerina Mann, ANNIKA

## 2022-01-10 NOTE — TELEPHONE ENCOUNTER
Patient's daughter Marvin Hills is calling in would like to speak with the doctor  [place] has questions on patient's chemotherapy     Best call back number [phone]

## 2022-01-10 NOTE — ASSESSMENT & PLAN NOTE
Lab Results   Component Value Date    EGFR 32 01/10/2022    EGFR 37 01/07/2022    EGFR 31 01/06/2022    CREATININE 1 55 (H) 01/10/2022    CREATININE 1 40 (H) 01/07/2022    CREATININE 1 59 (H) 01/06/2022     · Creatinine remains within her baseline  · Avoid nephrotoxic agents  · Resumed oral diuretics

## 2022-01-10 NOTE — ASSESSMENT & PLAN NOTE
· Initially treated with permissive HTN in acute CVA   · Now on Norvasc 5 mg p o  B i d   And blood pressure currently 692 systolic  · Resume lasix

## 2022-01-10 NOTE — PROGRESS NOTES
[facility]  Progress Note Thiago Hathaway 1950, 66 y o  female MRN: [id number]  Unit/Bed#: S -01 Encounter: [id number]  Primary Care Provider: Janay Wiggins DO   Date and time admitted to hospital: 1/3/2022  7:30 PM    * Acute ischemic stroke [facility])  Assessment & Plan  · Patient's initial reason for admission was multiple falls and intermittent confusion  · CTH negative  · MRI: Right basal ganglia and right caudate acute ischemia  · Repeat MRI of the brain with gadolinium and MRA imaging both noted to be unremarkable  · Continue Lipitor  · Echo unremarkable  · Patient with known h/o Afib  · Continue Eliquis AC  Added aspirin per her oncologist Dr Giacomo Real for concern of thrombosis caused by chemotherapy  · PT/OT evals noted recommending rehab - [facility] refused 1/9 and now awaiting other facilities  · No longer need permissive HTN - BP now 150s    Encephalopathy  Assessment & Plan  · Fluctuating mental status issues during hospital stay  · Likely related to underlying CVA and possible hospital-acquired delirium however now improving  · EEG with nonspecific slowing  · COVID negative x2  · No evidence of bleeding or change on repeat head CT    Multiple falls  Assessment & Plan  · Patient reported to have multiple falls (and reports of confusion and also with headaches/fatigue recently); abnormal MRI of the brain see above  Sustained mild contusions/buttock hematoma  · Head CT unremarkable  · PT/OT re-evaluation appreciated, now being recommended for rehab instead of home with VNA  · Acute rehab center feels patient not a candidate for their facility  · Await SNF level of rehab    Cystitis-resolved as of 1/10/2022  Assessment & Plan  · Abnormal UA with reported new onset urinary frequency during admission  · UCx reviewed  · Received 3 doses of IV ceftriaxone  This is noted to be E coli and is susceptible to Keflex    · Received 2 more days of Keflex and full course is now complete    Urinary retention  Assessment & Plan  · New onset urinary retention, required CIC x 4  · Bonner catheter was placed on 1/7/2022  · Voiding trial in approximately 1 week  · Outpatient urology consult, amb referral in  · Ensure adequate bowel regimen - had BM 1/9/22 AM    Atrial fibrillation (HCC)  Assessment & Plan  · Continue home meds including sotalol, metoprolol and Eliquis    Hypertension  Assessment & Plan  · Initially treated with permissive HTN in acute CVA   · Now on Norvasc 5 mg p o  B i d  And blood pressure currently 183 systolic  · Resume lasix    Chronic kidney disease, stage 3 [facility])  Assessment & Plan  Lab Results   Component Value Date    EGFR 32 01/10/2022    EGFR 37 01/07/2022    EGFR 31 01/06/2022    CREATININE 1 55 (H) 01/10/2022    CREATININE 1 40 (H) 01/07/2022    CREATININE 1 59 (H) 01/06/2022     · Creatinine remains within her baseline  · Avoid nephrotoxic agents  · Resumed oral diuretics--would continue based on mild bilateral lower extremity edema and stable creatinine    Lung cancer ([facility] [place] [address] )  Assessment & Plan  · History of lung cancer, metastatic to bone- on chemotherapy every 3 weeks being managed by Dr Adore Lopez  · Patient may require a change in her regimen due to acute stroke  · Prior discussions were had with patient's daughter that chemotherapy would not be given while at rehab  Ground glass opacity present on imaging of lung  Assessment & Plan  · Abnormal CT with ground-glass opacities in bilateral lower lobes  · Patient tested negative for COVID on admission and reported no respiratory complaints  Hypokalemia  Assessment & Plan  · Now repleted    Diarrhea-resolved as of 1/8/2022  Assessment & Plan  · Patient apparently has previous history of C diff and has had fairly persistent diarrhea since    Unclear if this was related to chemotherapy or if perhaps patient has not cleared her C diff infection; noted that he she actually had C diff positive PCR but not EIA in October  · Continue prophylactic vancomycin for 3 more days (72 hrs post stopping keflex)  · Requested Cdiff on admission  However since the time that the sample was requested, patient has ceased to have any additional events of diarrhea and therefore no sample could be sent    Diabetes type 2, controlled [facility])  Assessment & Plan  Lab Results   Component Value Date    HGBA1C 5 8 (H) 01/05/2022       Recent Labs     01/09/22  0719 01/09/22  1229 01/09/22  1601 01/10/22  0635   POCGLU 174* 236* 178* 167*       Blood Sugar Average: Last 72 hrs:  (P) 184 [id number]   · Monitor on Accu-Cheks  · Diabetic diet  · Hold metformin and Tradjenta  · Given A1c of 5 8, would discontinue Tradjenta on discharge and continue metformin alone  VTE Pharmacologic Prophylaxis:   Pharmacologic: Apixaban (Eliquis)  Mechanical VTE Prophylaxis in Place: No    Patient Centered Rounds: spoke with RN    Discussions with Specialists or Other Care Team Provider: spoke with case mgmt, physical therapy    Education and Discussions with Family / Patient: spoke with daughter over phone    Time Spent for Care: 30 minutes  More than 50% of total time spent on counseling and coordination of care as described above  Current Length of Stay: 6 day(s)    Current Patient Status: Inpatient   Certification Statement: The patient will continue to require additional inpatient hospital stay due to awaiting rehab    Discharge Plan:  Patient was denied acute rehab and now we are awaiting acceptance at skilled nursing facility rehab    Code Status: Level 3 - DNAR and DNI      Subjective:   Nursing reports patient is doing much better overall  I spoke with physical therapy who was in the room working with the patient  She was apparently a little lethargic this morning but now much more awake and interactive and did fairly well with her PT session though she still does require a enough assistance that she is recommended for rehab, not home    Patient denies any shortness of breath at this time  Was not aware of any significant leg swelling until she looked down at her legs when I was evaluating her and then did admit she sees some leg swelling  Objective:     Vitals:   Temp (24hrs), Av 1 °F (36 7 °C), Min:97 8 °F (36 6 °C), Max:98 4 °F (36 9 °C)    Temp:  [97 8 °F (36 6 °C)-98 4 °F (36 9 °C)] 97 8 °F (36 6 °C)  HR:  [55-63] 63  Resp:  [18] 18  BP: (138-187)/(60-84) 165/83  SpO2:  [96 %-99 %] 96 %  Body mass index is 30 55 kg/m²  Input and Output Summary (last 24 hours): Intake/Output Summary (Last 24 hours) at 1/10/2022 1126  Last data filed at 1/10/2022 0757  Gross per 24 hour   Intake --   Output 2300 ml   Net -2300 ml       Physical Exam:     Physical Exam  Vitals reviewed  Constitutional:       General: She is not in acute distress  Appearance: She is not ill-appearing, toxic-appearing or diaphoretic  Comments: Patient seen sitting up at edge of bed   Eyes:      General: No scleral icterus  Right eye: No discharge  Left eye: No discharge  Cardiovascular:      Rate and Rhythm: Normal rate and regular rhythm  Heart sounds: No murmur heard  Pulmonary:      Effort: No respiratory distress  Breath sounds: No stridor  No wheezing or rhonchi  Abdominal:      General: There is no distension  Palpations: Abdomen is soft  Tenderness: There is no guarding  Musculoskeletal:      Right lower leg: Edema present  Left lower leg: Edema present  Comments: 1+ bilateral lower extremity edema   Skin:     Coloration: Skin is not jaundiced or pale  Findings: No bruising, erythema, lesion or rash  Neurological:      Mental Status: She is alert        Comments: Much more awake alert interactive and conversant   Psychiatric:      Comments: Pleasant and cooperative           Additional Data:     Labs:    Results from last 7 days   Lab Units 22  0542   WBC Thousand/uL 12 51*   HEMOGLOBIN g/dL 11 5   HEMATOCRIT % 36 0   PLATELETS Thousands/uL 167   NEUTROS PCT % 82*   LYMPHS PCT % 7*   MONOS PCT % 9   EOS PCT % 0     Results from last 7 days   Lab Units 01/10/22  0452 01/07/22  0542 01/07/22  0542   SODIUM mmol/L 137   < > 141   POTASSIUM mmol/L 3 9   < > 3 4*   CHLORIDE mmol/L 101   < > 105   CO2 mmol/L 27   < > 27   BUN mg/dL 27*   < > 15   CREATININE mg/dL 1 55*   < > 1 40*   ANION GAP mmol/L 9   < > 9   CALCIUM mg/dL 9 6   < > 8 9   ALBUMIN g/dL  --   --  2 6*   TOTAL BILIRUBIN mg/dL  --   --  0 61   ALK PHOS U/L  --   --  97   ALT U/L  --   --  23   AST U/L  --   --  28   GLUCOSE RANDOM mg/dL 157*   < > 133    < > = values in this interval not displayed  Results from last 7 days   Lab Units 01/03/22  1940   INR  1 13     Results from last 7 days   Lab Units 01/10/22  0635 01/09/22  1601 01/09/22  1229 01/09/22  0719 01/08/22  1747 01/08/22  1046 01/08/22  0711 01/07/22  2128 01/07/22  1651 01/07/22  1057 01/07/22  0738 01/06/22  2105   POC GLUCOSE mg/dl 167* 178* 236* 174* 135 220* 168* 213* 128 256* 151* 245*     Results from last 7 days   Lab Units 01/05/22  0644   HEMOGLOBIN A1C % 5 8*           * I Have Reviewed All Lab Data Listed Above  * Additional Pertinent Lab Tests Reviewed:  All Labs Within Last 24 Hours Reviewed    Imaging:    Imaging Reports Reviewed Today Include:   Imaging Personally Reviewed by Myself Includes:      Recent Cultures (last 7 days):     Results from last 7 days   Lab Units 01/04/22  1449   URINE CULTURE  >100,000 cfu/ml Escherichia coli*       Last 24 Hours Medication List:   Current Facility-Administered Medications   Medication Dose Route Frequency Provider Last Rate    acetaminophen  650 mg Oral Q6H PRN EDUARDO Hernández      amLODIPine  5 mg Oral BID Palmira Clifton PA-C      apixaban  5 mg Oral BID Junella Santy, CRNP      aspirin  81 mg Oral Daily Palmira Clifton PA-C      atorvastatin  80 mg Oral Daily [facility], PA-C      busPIRone  7 5 mg Oral BID EDUARDO Hernández      cholecalciferol  2,000 Units Oral Daily EDUARDO Hernández      cyanocobalamin  100 mcg Oral Daily EDUARDO Hernández      docusate sodium  100 mg Oral BID PRN Jose Wall PA-C      folic acid  1 mg Oral Daily EDUARDO Hernández      furosemide  40 mg Oral Daily Jackeline Shipman PA-C      hydrALAZINE  10 mg Intravenous Q6H PRN Turner Choi MD      insulin lispro  1-6 Units Subcutaneous TID AC EDUARDO Hernández      loratadine  10 mg Oral Daily EDUARDO Hernández      [START ON 1/11/2022] losartan  100 mg Oral Daily Wagoner Mixer, DO      losartan  50 mg Oral Once Wagoner Mixer, DO      melatonin  6 mg Oral HS Jackeline Shipman PA-C      metoprolol tartrate  25 mg Oral Q12H [facility] EDUARDO Hernández      pantoprazole  40 mg Oral Early Morning EDUARDO Hernández      prochlorperazine  10 mg Oral Q12H PRN EDUARDO Hernández      sotalol  80 mg Oral BID EDUARDO Hernández      vancomycin  125 mg Oral Q12H [facility] Palmira Clifton PA-C      venlafaxine  37 5 mg Oral HS EDUARDO Hernández          Today, Patient Was Seen By: Jose Wall PA-C    ** Please Note: Dictation voice to text software may have been used in the creation of this document   **

## 2022-01-10 NOTE — PLAN OF CARE
Problem: OCCUPATIONAL THERAPY ADULT  Goal: Performs self-care activities at highest level of function for planned discharge setting  See evaluation for individualized goals  Description: Treatment Interventions: ADL retraining,Functional transfer training,Endurance training,Patient/family training,Fine motor coordination activities,Compensatory technique education,Continued evaluation,Energy conservation,Activityengagement  Equipment Recommended: Shower/Tub chair with back ($),Bedside commode (use of RW at this time)       See flowsheet documentation for full assessment, interventions and recommendations  Outcome: Progressing  Note: Limitation: Decreased ADL status,Decreased cognition,Decreased endurance,Decreased self-care trans,Decreased high-level ADLs     Assessment: Patient participated in Skilled OT session this date with interventions consisting of ADL re training with the use of correct body mechnaics, Energy Conservation techniques, Work simplification skills , safety awareness and fall prevention techniques,  therapeutic activities to: increase activity tolerance, increase dynamic sit/ stand balance during functional activity , increase postural control, increase trunk control and increase OOB/ sitting tolerance   Patient agreeable to OT treatment session, upon arrival patient was found supine in bed  In comparison to previous session, patient with improvements in participation in ADL's today  Patient still requiring mod/max assist for LB ADL's but with some improvement in attempts to improve performance  Patient still requiring significant assist to complete functional transfers, consistently requiring mod assist to achieve stance  Patient with increased cognition today and able to engage in full session but still requiring increased time to process command sand increased verbal cueing to sequence tasks and for safety during mobility and transfers   Patient requiring verbal cues for safety, verbal cues for correct technique, cognitive assistance to anticipate next step, one step directives and ocassional safety reminders  Patient continues to be functioning below baseline level, occupational performance remains limited secondary to factors listed above and increased risk for falls and injury  From OT standpoint, recommendation at time of d/c would be Post acute rehabilitation services  Patient to benefit from continued Occupational Therapy treatment while in the hospital to address deficits as defined above and maximize level of functional independence with ADLs and functional mobility        OT Discharge Recommendation: Post acute rehabilitation services  OT - OK to Discharge: Yes (Once medically cleared)    Michaela Shoemaker OT

## 2022-01-10 NOTE — ASSESSMENT & PLAN NOTE
· Patient apparently has previous history of C diff and has had fairly persistent diarrhea since  Unclear if this was related to chemotherapy or if perhaps patient has not cleared her C diff infection; noted that he she actually had C diff positive PCR but not EIA in October  · Continue prophylactic vancomycin for 3 more days (72 hrs post stopping keflex)  · Requested Cdiff on admission   However since the time that the sample was requested, patient has ceased to have any additional events of diarrhea and therefore no sample could be sent

## 2022-01-11 ENCOUNTER — PATIENT OUTREACH (OUTPATIENT)
Dept: CASE MANAGEMENT | Facility: OTHER | Age: 72
End: 2022-01-11

## 2022-01-11 ENCOUNTER — HOSPITAL ENCOUNTER (INPATIENT)
Facility: HOSPITAL | Age: 72
LOS: 10 days | Discharge: HOME HEALTH | DRG: 057 | End: 2022-01-21
Attending: PHYSICAL MEDICINE & REHABILITATION | Admitting: PHYSICAL MEDICINE & REHABILITATION
Payer: MEDICARE

## 2022-01-11 VITALS
SYSTOLIC BLOOD PRESSURE: 138 MMHG | RESPIRATION RATE: 18 BRPM | OXYGEN SATURATION: 96 % | DIASTOLIC BLOOD PRESSURE: 67 MMHG | HEART RATE: 55 BPM | BODY MASS INDEX: 30.39 KG/M2 | HEIGHT: 64 IN | TEMPERATURE: 98.5 F | WEIGHT: 178 LBS

## 2022-01-11 DIAGNOSIS — I48.91 ATRIAL FIBRILLATION (HCC): ICD-10-CM

## 2022-01-11 DIAGNOSIS — I63.9 ACUTE ISCHEMIC STROKE (HCC): Primary | ICD-10-CM

## 2022-01-11 DIAGNOSIS — E11.9 DIABETES TYPE 2, CONTROLLED (HCC): ICD-10-CM

## 2022-01-11 DIAGNOSIS — I10 HYPERTENSION: ICD-10-CM

## 2022-01-11 PROBLEM — T45.1X5A NEUROPATHY DUE TO CHEMOTHERAPEUTIC DRUG (HCC): Status: ACTIVE | Noted: 2022-01-11

## 2022-01-11 PROBLEM — N39.0 UTI (URINARY TRACT INFECTION): Status: ACTIVE | Noted: 2022-01-11

## 2022-01-11 PROBLEM — T45.1X5A CHEMOTHERAPY-INDUCED THROMBOCYTOPENIA: Status: ACTIVE | Noted: 2022-01-11

## 2022-01-11 PROBLEM — D69.59 CHEMOTHERAPY-INDUCED THROMBOCYTOPENIA: Status: ACTIVE | Noted: 2022-01-11

## 2022-01-11 PROBLEM — K21.9 GERD (GASTROESOPHAGEAL REFLUX DISEASE): Status: ACTIVE | Noted: 2022-01-11

## 2022-01-11 PROBLEM — I50.30 DIASTOLIC CONGESTIVE HEART FAILURE (HCC): Status: ACTIVE | Noted: 2022-01-11

## 2022-01-11 PROBLEM — G62.0 NEUROPATHY DUE TO CHEMOTHERAPEUTIC DRUG (HCC): Status: ACTIVE | Noted: 2022-01-11

## 2022-01-11 LAB
ANION GAP SERPL CALCULATED.3IONS-SCNC: 7 MMOL/L (ref 4–13)
BUN SERPL-MCNC: 30 MG/DL (ref 5–25)
CALCIUM SERPL-MCNC: 9.3 MG/DL (ref 8.3–10.1)
CHLORIDE SERPL-SCNC: 101 MMOL/L (ref 100–108)
CO2 SERPL-SCNC: 29 MMOL/L (ref 21–32)
CREAT SERPL-MCNC: 1.7 MG/DL (ref 0.6–1.3)
FLUAV RNA RESP QL NAA+PROBE: NEGATIVE
FLUBV RNA RESP QL NAA+PROBE: NEGATIVE
GFR SERPL CREATININE-BSD FRML MDRD: 29 ML/MIN/1.73SQ M
GLUCOSE SERPL-MCNC: 158 MG/DL (ref 65–140)
GLUCOSE SERPL-MCNC: 159 MG/DL (ref 65–140)
GLUCOSE SERPL-MCNC: 173 MG/DL (ref 65–140)
GLUCOSE SERPL-MCNC: 201 MG/DL (ref 65–140)
GLUCOSE SERPL-MCNC: 206 MG/DL (ref 65–140)
POTASSIUM SERPL-SCNC: 4.2 MMOL/L (ref 3.5–5.3)
RSV RNA RESP QL NAA+PROBE: NEGATIVE
SARS-COV-2 RNA RESP QL NAA+PROBE: NEGATIVE
SODIUM SERPL-SCNC: 137 MMOL/L (ref 136–145)

## 2022-01-11 PROCEDURE — 0241U HB NFCT DS VIR RESP RNA 4 TRGT: CPT | Performed by: PHYSICIAN ASSISTANT

## 2022-01-11 PROCEDURE — 99223 1ST HOSP IP/OBS HIGH 75: CPT | Performed by: PHYSICAL MEDICINE & REHABILITATION

## 2022-01-11 PROCEDURE — 1123F ACP DISCUSS/DSCN MKR DOCD: CPT | Performed by: PHYSICAL MEDICINE & REHABILITATION

## 2022-01-11 PROCEDURE — 82948 REAGENT STRIP/BLOOD GLUCOSE: CPT

## 2022-01-11 PROCEDURE — NC001 PR NO CHARGE

## 2022-01-11 PROCEDURE — 80048 BASIC METABOLIC PNL TOTAL CA: CPT | Performed by: GENERAL PRACTICE

## 2022-01-11 PROCEDURE — 99239 HOSP IP/OBS DSCHRG MGMT >30: CPT | Performed by: PHYSICIAN ASSISTANT

## 2022-01-11 RX ORDER — VENLAFAXINE HYDROCHLORIDE 37.5 MG/1
37.5 CAPSULE, EXTENDED RELEASE ORAL
Status: DISCONTINUED | OUTPATIENT
Start: 2022-01-11 | End: 2022-01-21 | Stop reason: HOSPADM

## 2022-01-11 RX ORDER — LANOLIN ALCOHOL/MO/W.PET/CERES
6 CREAM (GRAM) TOPICAL
Status: DISCONTINUED | OUTPATIENT
Start: 2022-01-11 | End: 2022-01-12

## 2022-01-11 RX ORDER — FOLIC ACID 1 MG/1
1 TABLET ORAL DAILY
Status: DISCONTINUED | OUTPATIENT
Start: 2022-01-12 | End: 2022-01-21 | Stop reason: HOSPADM

## 2022-01-11 RX ORDER — PANTOPRAZOLE SODIUM 40 MG/1
40 TABLET, DELAYED RELEASE ORAL
Status: DISCONTINUED | OUTPATIENT
Start: 2022-01-12 | End: 2022-01-21 | Stop reason: HOSPADM

## 2022-01-11 RX ORDER — BISACODYL 10 MG
10 SUPPOSITORY, RECTAL RECTAL DAILY PRN
Status: DISCONTINUED | OUTPATIENT
Start: 2022-01-11 | End: 2022-01-11

## 2022-01-11 RX ORDER — SOTALOL HYDROCHLORIDE 80 MG/1
80 TABLET ORAL 2 TIMES DAILY
Status: DISCONTINUED | OUTPATIENT
Start: 2022-01-11 | End: 2022-01-21 | Stop reason: HOSPADM

## 2022-01-11 RX ORDER — BUSPIRONE HYDROCHLORIDE 15 MG/1
7.5 TABLET ORAL 2 TIMES DAILY
Status: DISCONTINUED | OUTPATIENT
Start: 2022-01-11 | End: 2022-01-21 | Stop reason: HOSPADM

## 2022-01-11 RX ORDER — MELATONIN
2000 DAILY
Status: DISCONTINUED | OUTPATIENT
Start: 2022-01-12 | End: 2022-01-21 | Stop reason: HOSPADM

## 2022-01-11 RX ORDER — AMLODIPINE BESYLATE 5 MG/1
5 TABLET ORAL 2 TIMES DAILY
Status: DISCONTINUED | OUTPATIENT
Start: 2022-01-11 | End: 2022-01-12

## 2022-01-11 RX ORDER — ONDANSETRON 4 MG/1
4 TABLET, ORALLY DISINTEGRATING ORAL EVERY 6 HOURS PRN
Status: DISCONTINUED | OUTPATIENT
Start: 2022-01-11 | End: 2022-01-21 | Stop reason: HOSPADM

## 2022-01-11 RX ORDER — LOSARTAN POTASSIUM 50 MG/1
50 TABLET ORAL DAILY
Status: CANCELLED | OUTPATIENT
Start: 2022-01-11

## 2022-01-11 RX ORDER — LANOLIN ALCOHOL/MO/W.PET/CERES
6 CREAM (GRAM) TOPICAL
Status: CANCELLED | OUTPATIENT
Start: 2022-01-11

## 2022-01-11 RX ORDER — ACETAMINOPHEN 325 MG/1
650 TABLET ORAL EVERY 6 HOURS PRN
Status: DISCONTINUED | OUTPATIENT
Start: 2022-01-11 | End: 2022-01-21 | Stop reason: HOSPADM

## 2022-01-11 RX ORDER — DOCUSATE SODIUM 100 MG/1
100 CAPSULE, LIQUID FILLED ORAL 2 TIMES DAILY PRN
Status: DISCONTINUED | OUTPATIENT
Start: 2022-01-11 | End: 2022-01-21 | Stop reason: HOSPADM

## 2022-01-11 RX ORDER — BISACODYL 10 MG
10 SUPPOSITORY, RECTAL RECTAL DAILY PRN
Status: DISCONTINUED | OUTPATIENT
Start: 2022-01-11 | End: 2022-01-21 | Stop reason: HOSPADM

## 2022-01-11 RX ORDER — FUROSEMIDE 40 MG/1
40 TABLET ORAL DAILY
Status: CANCELLED | OUTPATIENT
Start: 2022-01-11

## 2022-01-11 RX ORDER — LOSARTAN POTASSIUM 50 MG/1
50 TABLET ORAL DAILY
Status: DISCONTINUED | OUTPATIENT
Start: 2022-01-12 | End: 2022-01-12

## 2022-01-11 RX ORDER — POLYETHYLENE GLYCOL 3350 17 G/17G
17 POWDER, FOR SOLUTION ORAL DAILY PRN
Status: DISCONTINUED | OUTPATIENT
Start: 2022-01-11 | End: 2022-01-11

## 2022-01-11 RX ORDER — LORATADINE 10 MG/1
10 TABLET ORAL DAILY
Status: DISCONTINUED | OUTPATIENT
Start: 2022-01-12 | End: 2022-01-21 | Stop reason: HOSPADM

## 2022-01-11 RX ORDER — POLYETHYLENE GLYCOL 3350 17 G/17G
17 POWDER, FOR SOLUTION ORAL DAILY PRN
Status: DISCONTINUED | OUTPATIENT
Start: 2022-01-11 | End: 2022-01-21 | Stop reason: HOSPADM

## 2022-01-11 RX ORDER — ASPIRIN 81 MG/1
81 TABLET ORAL DAILY
Status: DISCONTINUED | OUTPATIENT
Start: 2022-01-12 | End: 2022-01-21 | Stop reason: HOSPADM

## 2022-01-11 RX ORDER — ATORVASTATIN CALCIUM 80 MG/1
80 TABLET, FILM COATED ORAL
Status: DISCONTINUED | OUTPATIENT
Start: 2022-01-11 | End: 2022-01-21 | Stop reason: HOSPADM

## 2022-01-11 RX ORDER — FUROSEMIDE 40 MG/1
40 TABLET ORAL DAILY
Status: DISCONTINUED | OUTPATIENT
Start: 2022-01-12 | End: 2022-01-12

## 2022-01-11 RX ADMIN — LORATADINE 10 MG: 10 TABLET ORAL at 08:38

## 2022-01-11 RX ADMIN — FUROSEMIDE 40 MG: 40 TABLET ORAL at 08:38

## 2022-01-11 RX ADMIN — AMLODIPINE BESYLATE 5 MG: 5 TABLET ORAL at 08:38

## 2022-01-11 RX ADMIN — Medication 2000 UNITS: at 08:37

## 2022-01-11 RX ADMIN — SOTALOL HYDROCHLORIDE 80 MG: 80 TABLET ORAL at 08:37

## 2022-01-11 RX ADMIN — FOLIC ACID 1 MG: 1 TABLET ORAL at 08:38

## 2022-01-11 RX ADMIN — INSULIN LISPRO 2 UNITS: 100 INJECTION, SOLUTION INTRAVENOUS; SUBCUTANEOUS at 17:26

## 2022-01-11 RX ADMIN — MELATONIN TAB 3 MG 6 MG: 3 TAB at 21:36

## 2022-01-11 RX ADMIN — INSULIN LISPRO 1 UNITS: 100 INJECTION, SOLUTION INTRAVENOUS; SUBCUTANEOUS at 08:42

## 2022-01-11 RX ADMIN — Medication 125 MG: at 08:46

## 2022-01-11 RX ADMIN — APIXABAN 5 MG: 5 TABLET, FILM COATED ORAL at 17:22

## 2022-01-11 RX ADMIN — APIXABAN 5 MG: 5 TABLET, FILM COATED ORAL at 08:38

## 2022-01-11 RX ADMIN — AMLODIPINE BESYLATE 5 MG: 5 TABLET ORAL at 21:36

## 2022-01-11 RX ADMIN — SOTALOL HYDROCHLORIDE 80 MG: 80 TABLET ORAL at 17:22

## 2022-01-11 RX ADMIN — PANTOPRAZOLE SODIUM 40 MG: 40 TABLET, DELAYED RELEASE ORAL at 05:28

## 2022-01-11 RX ADMIN — INSULIN LISPRO 1 UNITS: 100 INJECTION, SOLUTION INTRAVENOUS; SUBCUTANEOUS at 11:35

## 2022-01-11 RX ADMIN — LOSARTAN POTASSIUM 50 MG: 50 TABLET, FILM COATED ORAL at 08:37

## 2022-01-11 RX ADMIN — VENLAFAXINE HYDROCHLORIDE 37.5 MG: 37.5 CAPSULE, EXTENDED RELEASE ORAL at 21:36

## 2022-01-11 RX ADMIN — BUSPIRONE HYDROCHLORIDE 7.5 MG: 15 TABLET ORAL at 21:36

## 2022-01-11 RX ADMIN — ASPIRIN 81 MG: 81 TABLET, COATED ORAL at 08:38

## 2022-01-11 RX ADMIN — METOPROLOL TARTRATE 25 MG: 25 TABLET, FILM COATED ORAL at 08:37

## 2022-01-11 RX ADMIN — ACETAMINOPHEN 650 MG: 325 TABLET, FILM COATED ORAL at 05:28

## 2022-01-11 RX ADMIN — VANCOMYCIN HYDROCHLORIDE 125 MG: 5 INJECTION, POWDER, LYOPHILIZED, FOR SOLUTION INTRAVENOUS at 21:35

## 2022-01-11 RX ADMIN — METOPROLOL TARTRATE 25 MG: 25 TABLET, FILM COATED ORAL at 21:35

## 2022-01-11 RX ADMIN — BUSPIRONE HYDROCHLORIDE 7.5 MG: 5 TABLET ORAL at 08:38

## 2022-01-11 RX ADMIN — VITAM B12 100 MCG: 100 TAB at 08:38

## 2022-01-11 RX ADMIN — ATORVASTATIN CALCIUM 80 MG: 80 TABLET, FILM COATED ORAL at 17:22

## 2022-01-11 NOTE — ASSESSMENT & PLAN NOTE
· Patient apparently has previous history of C diff and has had fairly persistent diarrhea since  Unclear if this was related to chemotherapy or if perhaps patient has not cleared her C diff infection; noted that he she actually had C diff positive PCR but not EIA in October  · Continue prophylactic vancomycin for 1 additional day tomorrow  · Requested Cdiff on admission   However since the time that the sample was requested, patient has ceased to have any additional events of diarrhea and therefore no sample could be sent

## 2022-01-11 NOTE — [facility] ADMISSION
Patient's case further discussed with [facility] medical director, and patient is approved or [facility] admission  Patient will admit to [facility] room 268 with transport time TBD  Report can be called to [phone]  She will require repeat COVID testing  CM has been updated

## 2022-01-11 NOTE — ASSESSMENT & PLAN NOTE
· Patient reported to have multiple falls (and reports of confusion and also with headaches/fatigue recently); abnormal MRI of the brain see above    Sustained mild contusions/buttock hematoma  · Head CT unremarkable  · PT/OT re-evaluation appreciated, now being recommended for rehab instead of home with [facility]  · Acute rehab now accepting

## 2022-01-11 NOTE — ASSESSMENT & PLAN NOTE
· History of lung cancer, metastatic to bone- on chemotherapy every 3 weeks being managed by Dr Atif Naranjo  · Patient may require a change in her regimen due to acute stroke  · Spoke with patient's daughter that chemotherapy would not be given while at rehab

## 2022-01-11 NOTE — CASE MANAGEMENT
Case Management Discharge Planning Note    Patient name Anita Cuevas  Location S /S -01 MRN [id number]  : 1950 Date 2022       Current Admission Date: 1/3/2022  Current Admission Diagnosis:Acute ischemic stroke [facility])   Patient Active Problem List    Diagnosis Date Noted    Encephalopathy 2022    Urinary retention 2022    Confusion     Acute ischemic stroke ([facility] ) 2022    Hypomagnesemia 2022    Ground glass opacity present on imaging of lung 2022    Multiple falls 2022    Buttocks Hematoma 2022    Contusion of left lower leg 2022    [facility] ) 20[facility]    Chronic kidney disease, stage 3 ([facility] ) 2022    Hypertension 2022    Diabetes type 2, controlled ([place] [address] ) 2022    Depression 2022    Hypokalemia 2022    Atrial fibrillation ([facility] ) 2022      LOS (days): 7  Geometric Mean LOS (GMLOS) (days): 4 40  Days to GMLOS:-2 3     OBJECTIVE:  Risk of Unplanned Readmission Score: 22         Current admission status: Inpatient   Preferred Pharmacy: No Pharmacies Listed  Primary Care Provider: Queenie Downing DO    Primary Insurance: MEDICARE  [facility]    DISCHARGE DETAILS:    CM was notified by [facility] Admissions that patient is approved for admission to [facility] at the [facility] location  SLIM and nursing aware  Negative Covid swab required for admission today  Patient is documented in PT note as being min assist for transfers, min assist for ambulation with RW and ambulating 60ftx2 with rest breaks  CM requests [facility] transport via [employer]  Awaiting pickup time  CM spoke to patient's daughter Jose Juan Lopez over the telephone at [phone] to verify the discharge plan to [address] reviewed with daughter Jose Juan Reglabalbir over the telephone  Daughter agrees with discharge determination  Facility contacts updated in [facility]ann[facility]ane

## 2022-01-11 NOTE — ASSESSMENT & PLAN NOTE
· Initially with permissive HTN in acute CVA, with blood pressures significantly and persistently elevated in the 108-395 systolic range    Reviewed trends in patient's blood pressure, noted that overall she remains controlled but still sporadically has elevated blood pressure in the 180 range  · Now on Norvasc 5 mg p o  B i d  and Lopressor 25 b i d  with Lasix 40 mg daily  · Would monitor but not make any additional adjustments at this time, with a goal to avoid hypotension

## 2022-01-11 NOTE — DISCHARGE SUMMARY
[facility]  Discharge- Ivett Marinellir 1950, 66 y o  female MRN: [id number]  Unit/Bed#: S -01 Encounter: [id number]  Primary Care Provider: Lizzy Harding DO   Date and time admitted to hospital: 1/3/2022  7:30 PM    * Acute ischemic stroke [facility])  Assessment & Plan  · Patient's initial reason for admission was multiple falls and intermittent confusion  · CTH negative  · MRI: Right basal ganglia and right caudate acute ischemia  · Repeat MRI of the brain with gadolinium and MRA imaging both noted to be unremarkable  · Continue Lipitor  · Echo unremarkable  · Patient with known h/o Afib  · Continue Eliquis AC  Added aspirin per her oncologist Dr Torin Mujica for concern of thrombosis caused by chemotherapy  · PT/OT evals noted recommending rehab - [facility]  · No longer need permissive HTN - BP now improved    Encephalopathy  Assessment & Plan  · Fluctuating mental status issues during hospital stay  · Likely related to underlying CVA and possible hospital-acquired delirium however now improving  · EEG with nonspecific slowing  · COVID negative x2  · No evidence of bleeding or change on repeat head CT    Hypertension  Assessment & Plan  · Initially with permissive HTN in acute CVA, with blood pressures significantly and persistently elevated in the 140-914 systolic range  Reviewed trends in patient's blood pressure, noted that overall she remains controlled but still sporadically has elevated blood pressure in the 180 range  · Now on Norvasc 5 mg p o  B i d  and Lopressor 25 b i d  with Lasix 40 mg daily  · Would monitor but not make any additional adjustments at this time, with a goal to avoid hypotension    Multiple falls  Assessment & Plan  · Patient reported to have multiple falls (and reports of confusion and also with headaches/fatigue recently); abnormal MRI of the brain see above    Sustained mild contusions/buttock hematoma  · Head CT unremarkable  · PT/OT re-evaluation appreciated, now being recommended for rehab instead of home with [facility]NA  · Acute rehab now accepting    Cystitis-resolved as of 1/10/2022  Assessment & Plan  · Abnormal UA with reported new onset urinary frequency during admission  · UCx reviewed  · Received 3 doses of IV ceftriaxone  This is noted to be E coli and is susceptible to Keflex  · Received 2 more days of Keflex and full course is now complete    Urinary retention  Assessment & Plan  · New onset urinary retention, required CIC x 4  · Bonner catheter was placed on 1/7/2022  · Voiding trial in approximately 1 week  · Outpatient urology consult, amb referral in  · Ensure adequate bowel regimen - had BM 1/9/22 AM    Atrial fibrillation ([facility] )  Assessment & Plan  · Continue home meds including sotalol, metoprolol and Eliquis    Chronic kidney disease, stage 3 [facility])  Assessment & Plan  Lab Results   Component Value Date    EGFR 29 01/11/2022    EGFR 32 01/10/2022    EGFR 37 01/07/2022    CREATININE 1 70 (H) 01/11/2022    CREATININE 1 55 (H) 01/10/2022    CREATININE 1 40 (H) 01/07/2022     · Creatinine slightly elevated today at 1 7 but not significantly elevated out of her baseline to be consistent with ADITYA  · Avoid nephrotoxic agents  · Resumed oral diuretics given evidence of leg edema  Monitor creatinine at rehab and consider decreasing Lasix to 20 mg daily or changing to p r n  Dosing if creatinine continues to rise    Lung cancer [facility])  Assessment & Plan  · History of lung cancer, metastatic to bone- on chemotherapy every 3 weeks being managed by Dr Jonathan Bowen  · Patient may require a change in her regimen due to acute stroke  · Spoke with patient's daughter that chemotherapy would not be given while at rehab  Ground glass opacity present on imaging of lung  Assessment & Plan  · Abnormal CT with ground-glass opacities in bilateral lower lobes      · Patient tested negative for COVID on admission and subsequently repeated on 2 other occasions;  reported no respiratory complaints  Hypokalemia  Assessment & Plan  · Now repleted    Diarrhea-resolved as of 1/8/2022  Assessment & Plan  · Patient apparently has previous history of C diff and has had fairly persistent diarrhea since  Unclear if this was related to chemotherapy or if perhaps patient has not cleared her C diff infection; noted that he she actually had C diff positive PCR but not EIA in October  · Continue prophylactic vancomycin for 1 additional day tomorrow  · Requested Cdiff on admission  However since the time that the sample was requested, patient has ceased to have any additional events of diarrhea and therefore no sample could be sent    Diabetes type 2, controlled [facility])  Assessment & Plan  Lab Results   Component Value Date    HGBA1C 5 8 (H) 01/05/2022       Recent Labs     01/10/22  1601 01/10/22  2044 01/11/22  0730 01/11/22  1100   POCGLU 180* 206* 159* 201*       Blood Sugar Average: Last 72 hrs:  (P) 219 [id number]   · Monitor on Accu-Cheks  · Diabetic diet  · Held metformin and Tradjenta  · In the setting of creatinine 1 7, will continue to hold medications above and monitor closely    May be able to resume low-dose metformin in near future    Discharging Physician / Practitioner: Bettie Ascencio PA-C  PCP: Kimi Lucio DO  Admission Date:   Admission Orders (From admission, onward)     Ordered        01/04/22 1618  Inpatient Admission  Once            01/03/22 2106  Place in Observation  Once                      Discharge Date: 01/11/22    Medical Problems             Resolved Problems  Date Reviewed: 1/11/2022          Resolved    Cystitis 1/10/2022     Resolved by  Bettie Ascencio PA-C    Diarrhea 1/8/2022     Resolved by  Dez Aly PA-C                Consultations During Hospital Stay:  · Trauma  · Neurology  · Geriatrics    Procedures Performed:   · MRI brain without gadolinium  · MRI brain with gadolinium  · MRA head and neck  · Head CT x3  · EEG  · 2D echocardiogram  · CT chest abdomen and pelvis with contrast  · CT cervical spine    Significant Findings / Test Results:   · As above    Incidental Findings:   · None     Test Results Pending at Discharge (will require follow up): · None     Outpatient Tests Requested:  · BMP within 2 days    Complications: Altered mental status    Reason for Admission:  Multiple falls    Hospital Course:     Lambert Monge is a 66 y o  female patient who originally presented to the hospital on 1/3/2022 due to multiple falls resulting in buttocks hematoma and leg contusion for which she was originally admitted under the Trauma Service  Due to complaints of headaches and altered mental status she was seen in consultation by Internal Medicine and was subsequently transferred to our service after an MRI of the brain revealed evidence of an acute ischemic stroke  She was seen in consultation by Neurology and had full workup  She was maintained on Eliquis which she was already on prior to the hospitalization but after discussion with her outpatient oncologist, aspirin was added to her regimen for concerned that she developed the stroke due to thrombosis related to her current chemotherapy regimen for lung cancer  She had initially been seen by PT and OT on admission and was cleared for home with VNA but subsequently did have a decline in her functionality in mental status while here in the hospital and then was recommended for rehab  In fact her hospital stay was quite complicated by alterations and fluctuations in her mental status  While here in the hospital she was treated for urinary tract infection and completed a 5 day course of antibiotics  Due to initial complaints of diarrhea we had requested a C diff sample but patient's diarrhea ceased and a stool sample could never be obtained  In addition she did have urinary retention for which a Bonner catheter had to be placed    Her blood pressure was initially quite severely elevated but given the acute stroke we allowed permissive hypertension initially and then subsequently we did bring her blood pressure under better control  She has underlying chronic kidney disease with some fluctuations but overall stability in her creatinine  She was found to have a ground-glass opacity in her lung on imaging but tested negative for COVID on 3 occasions during this hospital stay  At time of discharge she is overall improved though she does continue to have some episodic lethargy and confusion for which additional neurologic and neuropsych workup would be of benefit once she is medically optimized  She was accepted at acute rehab today and will be discharged there   Please see above list of diagnoses and related plan for additional information  Condition at Discharge: fair     Discharge Day Visit / Exam:     Subjective:  Nursing reported no events overnight the patient and she denies any complaints  No significant events of lethargy reported by nursing staff or any agitation  I noted that she did say a few unusual things this morning like "they changed the plans" and are sending her to a "4 story house or something bigger"--when I asked what she meant and who told her this she said "your boss " Moments later however she said that this was " just a joke"  Vitals: Blood Pressure: 138/67 (01/11/22 1100)  Pulse: 55 (01/11/22 1100)  Temperature: 98 5 °F (36 9 °C) (01/11/22 1100)  Temp Source: Oral (01/11/22 1100)  Respirations: 18 (01/11/22 1100)  Height: 5' 4" (162 6 cm) (01/05/22 1032)  Weight - Scale: 80 7 kg (178 lb) (01/05/22 1032)  SpO2: 96 % (01/11/22 1100)  Exam:   Physical Exam  Vitals reviewed  Constitutional:       General: She is not in acute distress  Appearance: She is obese  She is not ill-appearing, toxic-appearing or diaphoretic        Comments: Patient seen lying comfortably in bed, her eyes are closed but she is communicating with me   Cardiovascular:      Rate and Rhythm: Normal rate  Rhythm irregular  Heart sounds: No murmur heard  Pulmonary:      Effort: No respiratory distress  Breath sounds: No stridor  No wheezing or rhonchi  Comments: Not requiring any oxygen  Abdominal:      General: There is no distension  Tenderness: There is no abdominal tenderness  There is no guarding  Genitourinary:     Comments: Bonner in place with clear yellow urine  Musculoskeletal:      Right lower leg: Edema present  Left lower leg: Edema present  Comments: 1+ bilateral lower extremity ankle edema   Skin:     General: Skin is warm and dry  Coloration: Skin is not jaundiced or pale  Findings: Bruising present  No erythema, lesion or rash  Neurological:      Comments: Conversant         Discussion with Family:  Spoke with patient's daughter over the phone, all questions and concerns were addressed    Discharge instructions/Information to patient and family:   See after visit summary for information provided to patient and family  Provisions for Follow-Up Care:  See after visit summary for information related to follow-up care and any pertinent home health orders  Disposition: [facility]    Planned Readmission: [facility]     Discharge Statement:  I spent 35 minutes discharging the patient  This time was spent on the day of discharge  I had direct contact with the patient on the day of discharge  Greater than 50% of the total time was spent examining patient, answering all patient questions, arranging and discussing plan of care with patient as well as directly providing post-discharge instructions  Additional time then spent on discharge activities  Case was discussed with patient's nurse, case management and my manager    Discharge Medications:  See after visit summary for reconciled discharge medications provided to patient and family        ** Please Note: This note has been constructed using a voice recognition system **

## 2022-01-11 NOTE — ASSESSMENT & PLAN NOTE
Lab Results   Component Value Date    EGFR 29 01/11/2022    EGFR 32 01/10/2022    EGFR 37 01/07/2022    CREATININE 1 70 (H) 01/11/2022    CREATININE 1 55 (H) 01/10/2022    CREATININE 1 40 (H) 01/07/2022     · Creatinine slightly elevated today at 1 7 but not significantly elevated out of her baseline to be consistent with ADITYA  · Avoid nephrotoxic agents  · Resumed oral diuretics given evidence of leg edema  Monitor creatinine at rehab and consider decreasing Lasix to 20 mg daily or changing to p r n   Dosing if creatinine continues to rise

## 2022-01-11 NOTE — ASSESSMENT & PLAN NOTE
Lab Results   Component Value Date    HGBA1C 5 8 (H) 01/05/2022       Recent Labs     01/10/22  1601 01/10/22  2044 01/11/22  0730 01/11/22  1100   POCGLU 180* 206* 159* 201*       Blood Sugar Average: Last 72 hrs:  (P) 269 [id number]   · Monitor on Accu-Cheks  · Diabetic diet  · Held metformin and Tradjenta  · In the setting of creatinine 1 7, will continue to hold medications above and monitor closely    May be able to resume low-dose metformin in near future

## 2022-01-11 NOTE — PROGRESS NOTES
PHYSICAL MEDICINE AND REHABILITATION   PREADMISSION ASSESSMENT     Projected [facility] Diagnoses:  Impairment of mobility, safety, Activities of Daily Living (ADLs), and cognitive/communication skills due to Stroke:  01 4  No paresis  Etiologic Dx: Right Basal Ganglia Infarct  Date of Onset: 1/3/2022   Date of surgery: N/A    PATIENT INFORMATION  Name: Abelino Stafford Phone #: [phone] (home)   Address: [place] [address]  YOB: 1950 Age: 66 y o  SS#   Marital Status: Single  Ethnicity:   Employment Status: retired  Extended Emergency Contact Information  Primary Emergency Contact: [address]  Mobile Phone: [phone]  Relation: Daughter  Secondary Emergency Contact: Julisa Rosales  Mobile Phone: [phone]  Relation: Brother  Advance Directive: Level 3 DNAR/DNI - unknown advanced directive    INSURANCE/COVERAGE:     Primary Payor: MEDICARE / Plan: MEDICARE A AND B / Product Type: Medicare A & B Fee for Service /   Secondary Payer: [facility]   Payer Contact:  Payer Contact:   Contact Phone:  Contact Phone:     Authorization #:   Coverage Dates:  LCD:   MEDICARE #: [id number]  Medicare Days: 60/30/60  Medical Record #: [id number]    REFERRAL SOURCE:   Referring provider: Gus Souza MD  Referring facility: [address] [place] [address]  Room: S /S -01  PCP: Clark Kearney DO PCP phone number: [phone]    MEDICAL INFORMATION  HPI: Patient is a 66year old female that presented to [address] [place] [address] on 1/3/2022 as a level B trauma s/p multiple falls at home  Patient states she did hit her head during several falls, however denies LOC  Patient with complaints of right ankle pain  Right ankle x-ray was negative  CTH and CT cervical spine was negative for acute findings   CT of the chest/abdomen/pelvis showed suspected small post traumatic hematoma measuring 2cm in soft tissues of right buttock/perianal region; no active bleeding; scattered groundglass opacities in both lungs  COVID test resulted negative on 1/3/2022  Patient was continued on Eliquis and initiated on multimodal pain regimen for acute pain management  SLIM was consulted regarding suspected toxic metabolic encephalopathy, as patient with noted worsening confusion  Urinalysis was abnormal, and patient was initiated on Cipro, then transitioned to Rocephin  Of note, patient with recent Cdiff + October 2021, and was continued on prophylactic Vanco in setting of antibiotics for UTI  Urine culture showed E coli and patient was transitioned back to PO Keflex to complete 5 days treatment  Neurology was consulted, as MRI of the brain showed right basal ganglia/caudate infarct/ischemia  Likely etiology Afib/hypercoagulable state from malignancy  ECHO showed an EF of 60%, grade 1 abnormal relaxation, concentric remodeling, mild AVR  Repeat MRI of the brain on 1/5 showed no enhancing lesions  Per Neurology and Oncology, patient was continued on Eliquis, with addition of ASA due to concern of thrombosis caused by chemotherapy  EEG was also obtained, which showed nonspecific slowing and no seizure activity  Suspected encephalopathy Additionally, patient was with noted new onset urinary retention and required bahena insertion on 1/8/2022  Of note, patient with history of metastatic lung cancer, and receives chemotherapy every 3 weeks  This is currently on hold and will not be administered while on ARC  Patient is overall hemodynamically stable and medically cleared for discharge to [facility]  PT/OT therapies were consulted and they are recommending patient for inpatient Acute Rehab  She has demonstrated that she can tolerate and participate in 3 hours of therapy per day  **RECEIVED PFIZER VACCINES - ON 2/20/2021, 3/12/2021 AND 11/22/2021  COVID TEST RESULTED NEGATIVE ON 1/3/2022, 1/6/2022 AND 1/11/2022  Past Medical History:   Past Surgical History:    Allergies: History reviewed  No pertinent past medical history  History reviewed  No pertinent surgical history  No Known Allergies      Comorbidities/Surgeries in the last 100 days: s/p falls, right buttocks hematoma, Afib on Eliquis, depression, DM, HTN, CKD3, lung cancer with bone metastasis on chemotherapy every 3 weeks, ambulatory dysfunction, acute pain, encephalopathy, UTI, urinary retention    CURRENT VITAL SIGNS:   Temp:  [97 6 °F (36 4 °C)-98 7 °F (37 1 °C)] 98 5 °F (36 9 °C)  HR:  [55-72] 55  Resp:  [18] 18  BP: (119-187)/(64-85) 138/67   Intake/Output Summary (Last 24 hours) at 1/11/2022 1236  Last data filed at 1/11/2022 0531  Gross per 24 hour   Intake --   Output 1600 ml   Net -1600 ml        LABORATORY RESULTS:      Lab Results   Component Value Date    HGB 11 5 01/07/2022    HCT 36 0 01/07/2022    WBC 12 51 (H) 01/07/2022     Lab Results   Component Value Date    BUN 30 (H) 01/11/2022    K 4 2 01/11/2022     01/11/2022    GLUCOSE 98 01/03/2022    CREATININE 1 70 (H) 01/11/2022     Lab Results   Component Value Date    PROTIME 14 5 01/03/2022    INR 1 13 01/03/2022        DIAGNOSTIC STUDIES:  XR knee 1 or 2 vw left    Result Date: 1/4/2022  Impression: No acute osseous abnormality  Osteoarthritis and joint effusion  Other nonemergent findings above  Workstation performed: [id number]     XR ankle 2 vw right    Result Date: 1/5/2022  Impression: No acute cardiopulmonary disease within limitations of supine imaging  Chronic opacity left upper lung known to be tumor by prior imaging  No acute fracture or dislocation right ankle  Workstation performed: [id number]     CT head wo contrast    Result Date: 1/6/2022  Impression: Evolving right basal ganglia infarct  No hemorrhage or significant mass effect  Workstation performed: [id number]     CT head wo contrast    Result Date: 1/4/2022  Impression: No acute intracranial abnormality   Workstation performed: [id number]     TRAUMA - CT head wo contrast    Addendum Date: 1/3/2022    ADDENDUM: Comparison was made to a previous brain MRI performed under separate medical record number on November 1, 2021  I personally discussed this study with Rosana Christine on 1/3/2022 at 8:43 PM     Result Date: 1/3/2022  Impression: No acute intracranial abnormality  Workstation performed: [id number]     TRAUMA - CT spine cervical wo contrast    Addendum Date: 1/3/2022    ADDENDUM: I personally discussed this study with Rosana Christine on 1/3/2022 at 8:43 PM      Result Date: 1/3/2022  Impression: No cervical spine fracture or traumatic malalignment  Workstation performed: [id number]     MRA head wo contrast    Result Date: 1/5/2022  Impression: No occlusive disease  Workstation performed: [id number]     MRA carotids wo and w contrast    Result Date: 1/5/2022  Impression: No occlusive disease  Workstation performed: [id number]     MRI brain wo contrast    Result Date: 1/4/2022  Impression: Right basal ganglia and right caudate acute ischemia  Findings were marked as "immediate"in Epic and will now be related to the ordering physician or covering clinical team by the radiology liaison  Workstation performed: [id number]     MRI brain w contrast    Result Date: 1/5/2022  Impression: No enhancing lesions  Workstation performed: [id number]     XR chest 1 view    Result Date: 1/5/2022  Impression: No acute cardiopulmonary disease within limitations of supine imaging  Chronic opacity left upper lung known to be tumor by prior imaging  No acute fracture or dislocation right ankle  Workstation performed: [id number]     TRAUMA - CT chest abdomen pelvis w contrast    Result Date: 1/3/2022  Impression: Suspected small post traumatic hematoma measuring 2 cm in the soft tissues of the right buttock/perianal region  No active bleeding  No visceral or osseous injury identified   Scattered groundglass opacities in both lungs which are new from prior exam   Consider infectious or inflammatory etiologies including Covid viral pneumonia  Previous right lower lobe lung mass has diminished since July  Previous left upper lobe tumor has also decreased in size but appears more atelectatic  Increasing water attenuation pleural fluid, partially loculated pleural fluid in the left lower lung  Follow-up per cancer imaging protocol  I personally discussed this study with Yashira Martinez on 1/3/2022 at 8:43 PM  Stable pancreatic cystic lesions follow-up per protocol  Small hiatal hernia with gastroesophageal reflux  Colonic diverticulosis  Mild bladder wall thickening which may be Due to incomplete distention  Workstation performed: [id number]     XR Trauma multiple (SLB/SLRA trauma bay ONLY)    Result Date: 1/3/2022  Impression: No acute cardiopulmonary disease within limitations of supine imaging  Chronic opacity left upper lung known to be tumor by prior imaging  No acute fracture or dislocation right ankle  Workstation performed: [id number]       PRECAUTIONS/SPECIAL NEEDS:  Tobacco:   Social History     Tobacco Use   Smoking Status Not on file   Smokeless Tobacco Not on file   , Alcohol:    Social History     Substance and Sexual Activity   Alcohol Use None   , Anticoagulation:  aspirin 81 mg orally every day and Eliquis 5mg PO BID, Blood Sugar Management: as per MD recommendations, Edema Management, Safety Concerns, Pain Management, IV: Type: Peripheral Location: Left Wrist Reason: Medications/IVF, Aspiration Risk/Precautions, Dietary Restrictions: Diabetic diet, Visually Impaired, Language Preference: English and Fall Precautions      MEDICATIONS:     Current Facility-Administered Medications:     acetaminophen (TYLENOL) tablet 650 mg, 650 mg, Oral, Q6H PRN, EDUARDO Leon, 650 mg at 01/11/22 0528    amLODIPine (NORVASC) tablet 5 mg, 5 mg, Oral, BID, Palmira Clifton PA-C, 5 mg at 01/11/22 2915    apixaban (ELIQUIS) tablet 5 mg, 5 mg, Oral, BID, EDUARDO Leon, 5 mg at 01/11/22 0838    aspirin (ECOTRIN LOW STRENGTH) EC tablet 81 mg, 81 mg, Oral, Daily, Palmira Clifton PA-C, 81 mg at 01/11/22 0838    atorvastatin (LIPITOR) tablet 80 mg, 80 mg, Oral, Daily With Ita Pérez PA-C, 80 mg at 01/10/22 1757    busPIRone (BUSPAR) tablet 7 5 mg, 7 5 mg, Oral, BID, EDUARDO Hayes, 7 5 mg at 01/11/22 0914    cholecalciferol (VITAMIN D3) tablet 2,000 Units, 2,000 Units, Oral, Daily, EDUARDO Hayes, 2,000 Units at 01/11/22 3069    cyanocobalamin (VITAMIN B-12) tablet 100 mcg, 100 mcg, Oral, Daily, EDUARDO Hayes, 100 mcg at 01/11/22 8152    docusate sodium (COLACE) capsule 100 mg, 100 mg, Oral, BID PRN, Palmira Clifton PA-C    folic acid (FOLVITE) tablet 1 mg, 1 mg, Oral, Daily, EDUARDO Hayes, 1 mg at 01/11/22 8767    furosemide (LASIX) tablet 40 mg, 40 mg, Oral, Daily, Jackeline Shipman PA-C, 40 mg at 01/11/22 3067    hydrALAZINE (APRESOLINE) injection 10 mg, 10 mg, Intravenous, Q6H PRN, Sean Walden MD, 10 mg at 01/06/22 1115    insulin lispro (HumaLOG) 100 units/mL subcutaneous injection 1-6 Units, 1-6 Units, Subcutaneous, TID AC, 1 Units at 01/11/22 1135 **AND** Fingerstick Glucose (POCT), , , TID AC, EDUARDO Hayes    loratadine (CLARITIN) tablet 10 mg, 10 mg, Oral, Daily, EDUARDO Hayes, 10 mg at 01/11/22 6435    losartan (COZAAR) tablet 50 mg, 50 mg, Oral, Daily, Tiera Villanueva DO, 50 mg at 01/11/22 0837    melatonin tablet 6 mg, 6 mg, Oral, HS, Jackeline Shipman PA-C, 6 mg at 01/10/22 2107    metoprolol tartrate (LOPRESSOR) tablet 25 mg, 25 mg, Oral, Q12H [facility], EDUARDO Hayes, 25 mg at 01/11/22 0837    pantoprazole (PROTONIX) EC tablet 40 mg, 40 mg, Oral, Early Morning, EDUARDO Hayes, 40 mg at 01/11/22 1713    prochlorperazine (COMPAZINE) tablet 10 mg, 10 mg, Oral, Q12H PRN, EDUARDO Hayes, 10 mg at 01/03/22 2156    sotalol (BETAPACE) tablet 80 mg, 80 mg, Oral, BID, EDUARDO Hayes, 80 mg at 01/11/22 1308   vancomycin (VANCOCIN) oral solution 125 mg, 125 mg, Oral, Q12H Palmira CARD PA-C, 125 mg at 01/11/22 0846    venlafaxine (EFFEXOR-XR) 24 hr capsule 37 5 mg, 37 5 mg, Oral, HS, Arya Parents, CRNP, 37 5 mg at 01/10/22 2107    SKIN INTEGRITY:   no rashes, no erythema, ecchymoses - buttock(s) left; left buttocks abrasion    PRIOR LEVEL OF FUNCTION:  She lives in a(n) single family home  Toño Wright is single and lives with their family - brother  Self Care: Independent, Indoor Mobility: Independent, Stairs (in/outdoor): Independent and Cognition: Independent  Prior to patient's admission, patient was fully Independent with ADLs and IADLs, including driving  Patient was Independent without use of AD for mobility  FALLS IN THE LAST 6 MONTHS: 5 to 10    HOME ENVIRONMENT:  The living area: can live on one level  There are No steps to enter the home  The patient will have 24 hour supervision/physical assistance available upon discharge  Patient's cousin is currently staying with patient and brother to assist as needed  Patient's daughter is able to take FMLA and is looking into arranging for 24/7 HHA upon discharge from rehab  PREVIOUS DME:  Equipment in home (previous DME): Shower Chair, Grab Bars, Rolling Walker and Mechanical Lift    FUNCTIONAL STATUS:  Physical Therapy Occupational Therapy Speech Therapy   1/10/2022, per PTA    Cognition   Overall Cognitive Status Impaired   Arousal/Participation Alert; Responsive; Cooperative   Attention Attends with cues to redirect   Orientation Level Oriented to person;Oriented to place; Disoriented to time;Disoriented to situation   Memory Decreased short term memory;Decreased recall of precautions   Following Commands Follows one step commands with increased time or repetition   Comments pt continued to require VC's throughout tx session for hand placement, redirecting pt towards completing task at hand and safety while advancing RW   Subjective   Subjective pt was agreeable to participate in PT intervention    Bed Mobility   Rolling R Unable to assess   Rolling L Unable to assess   Supine to Sit Unable to assess   Sit to Supine Unable to assess   Additional Comments pt seated OoB in the recliner pre/post tx session    Transfers   Sit to Stand 4  Minimal assistance   Additional items Assist x 1; Armrests; Increased time required;Verbal cues   Stand to Sit 4  Minimal assistance   Additional items Assist x 1; Armrests; Increased time required;Verbal cues   Stand pivot Unable to assess   Toilet transfer Unable to assess   Additional Comments pt continues to require RW to complete all functional transfers in todays tx session with constant VC's for hand placement while ascending to RW and descending back into recliner  Ambulation/Elevation   Gait pattern Decreased foot clearance; Foward flexed; Step to;Excessively slow   Gait Assistance 4  Minimal assist   Additional items Assist x 1;Verbal cues   Assistive Device Rolling walker   Distance 60'x2 RW   (seated therapeutic rest breaks in between ambulation trials )   Balance   Static Sitting Fair   Dynamic Sitting Fair -   Static Standing Fair -   Dynamic Standing Fair -   Ambulatory Poor +   Endurance Deficit   Endurance Deficit Yes   Endurance Deficit Description limited activity tolerance, ambulation distance and continued to demonstrate a gait deviation    Activity Tolerance   Activity Tolerance Patient limited by fatigue   Nurse Made Aware Spoke to RN Tiago Lala    Exercises   Knee AROM Short Arc Quad Sitting;15 reps;AROM; Bilateral  (long sit position )   Knee AROM Long Arc Quad Sitting;20 reps;AROM; Bilateral   Ankle Pumps Sitting;20 reps;AROM; Bilateral   Marching Sitting;20 reps;AROM; Bilateral   Assessment   Prognosis Fair   Problem List Decreased strength;Decreased endurance; Impaired balance;Decreased mobility; Decreased cognition;Decreased safety awareness; Obesity   Assessment pt began tx session seated OOB in the recliner and was agreeable to participate in PT intervention  pt was able to complete 3 STS from recliner w/ VC's for hand placement while ascending to RW and descending back into recliner in order to strengthen LE's and increase endurance and safety with all functional transfers  Slight progress was noted as pt completed all functional transfers in todays tx session with min Ax1 and VC's  pt was able to increase ambulation tolerance and distance in todays tx session by ambulating 60'x2 RW and remains consistant with min Ax1 for balance and safety  pt would benefit from continued focus on OoB mobility with progression of ambulation distance  continue to recommend post acute rehab services at the time of D/C in order to maximize pt functional independence and safety w/ all OOB mobility  post tx session pt seated OOB in the recliner with call bell and all pt needs met       1/10/2022, per OT    ADL   Eating Assistance 5  Supervision/Setup   Eating Deficit Setup; Increased time to complete; Beverage management   Eating Comments Pt breakfast arrived at end of session  Pt requiring assist to open containers and setup tray for functional feeding  Grooming Assistance 5  Supervision/Setup   Grooming Deficit Setup; Wash/dry face;Verbal cueing;Supervision/safety; Increased time to complete   Grooming Comments Pt washed face seated EOB  Pt required increased cueing to complete task  Pt slow to respond to commands  UB Bathing Assistance 5  Supervision/Setup   UB Bathing Deficit Setup;Verbal cueing;Supervision/safety; Increased time to complete; Chest;Right arm;Left arm; Abdomen   UB Bathing Comments Pt to complete UB bathing seated EOB  Pt requiring increased time to process commands and required cueing to sequence activity  Pt able to maintain sitting balance during task  LB Bathing Assistance 3  Moderate Assistance   LB Bathing Deficit Setup;Steadying;Verbal cueing;Supervision/safety; Increased time to complete;Perineal area; Buttocks;Right upper leg;Left upper leg;Right lower leg including foot; Left lower leg including foot   LB Bathing Comments Pt to complete LB bathing sitting EOB  Pt able to wash upper thighs and slightly below knees but unable to reach feet or perform perineal hygiene in stance  Pt requiring mod a x 1 to stand and max assist to complete perineal hygiene  Pt requiring increased cueing to complete LB bathing  UB Dressing Assistance 4  Minimal Assistance   UB Dressing Deficit Setup;Steadying;Verbal cueing;Supervision/safety; Increased time to complete; Thread RUE; Thread LUE;Pull around back   UB Dressing Comments Pt to don new hospital gown and robe while seated EOB  Pt requiring increased time to process commands and increased verbal cueing for sequencing of tasks  Pt somewhat confused when handed robe and placed over lap as if it were a blanket  LB Dressing Assistance 2  Maximal Assistance   LB Dressing Deficit Setup;Verbal cueing;Supervision/safety; Increased time to complete; Don/doff R sock; Don/doff L sock   LB Dressing Comments Pt unable to don socks sitting EOB today, requiring max assist     Toileting Assistance  3  Moderate Assistance   Toileting Deficit Setup;Steadying;Verbal cueing;Supervison/safety; Increased time to complete; Bedside commode;Perineal hygiene   Toileting Comments Pt to void on commode at beginning of session  Pt requiring min/mod a x 1 to take few steps from EOB to commode with use of RW  Pt with BM on commode  Pt unable to perform perineal hygiene in stance, requiring mod a x 1 and increased verbal cueing to stand from commode and max a to complete perineal hygiene  Pt attempting to stand from commode x 4 with use of armrests but overall weak and deconditioned  Bed Mobility   Supine to Sit 3  Moderate assistance   Additional items Assist x 1; Increased time required;LE management;Verbal cues; Bedrails   Additional Comments Pt OOB to relciner at end of session      Transfers   Sit to Stand 3  Moderate assistance   Additional items Assist x 1; Increased time required;Verbal cues   Stand to Sit 4  Minimal assistance   Additional items Assist x 1; Increased time required;Verbal cues   Toilet transfer 3  Moderate assistance   Additional items Assist x 1; Increased time required;Verbal cues; Commode   Additional Comments Pt consistently requiring mod a x 1 to stand from various surfaces  Despite increased verbal cueing for hand placement and use of armrests on commode during toilet transfer, pt requiring increased assist to achieve standing position  Pt requiring increased trials to initiate standing movement during session, and benefits from cueing to lean forward and use momentum  Functional Mobility   Functional Mobility 4  Minimal assistance   Additional Comments Pt took few steps from EOB <> commode and then from EOB to recliner with use of RW and min/mod assist at times  Pt requiring increased assist when navigating turns and increased verbal cueing for spatial awareness  Pt attempting to sit in recliner when too far away from chair and requiring increased verbal cueing for safety  Pt also benefitting from increased cueing for safe walker management as pt often found with walker too far away from body  Additional items Rolling walker   Toilet Transfers   Toilet Transfer From [employer] Transfer Type To and from   Toilet Transfer to Standard bedside commode   Toilet Transfer Technique Ambulating   Toilet Transfers Moderate assistance   Cognition   Overall Cognitive Status Impaired   Arousal/Participation Cooperative;Arousable   Attention Attends with cues to redirect   Orientation Level Oriented to person;Oriented to place; Disoriented to time;Disoriented to situation   Memory Decreased recall of precautions;Decreased short term memory   Following Commands Follows one step commands with increased time or repetition   Comments Pt initially lethargic upon arrival but agreeable to session   Pt sometimes slow to respond and requiring increased time to process commands  Pt benefits from increased verbal cueing to sequence tasks and cueing during mobility for safety  Pt to benefit from further cognitive evaluation to determine safe discharge  Activity Tolerance   Activity Tolerance Patient limited by fatigue   Medical Staff Made Aware  Yes, RN ok to see pt  Assessment   Assessment Patient participated in Skilled OT session this date with interventions consisting of ADL re training with the use of correct body mechnaics, Energy Conservation techniques, Work simplification skills , safety awareness and fall prevention techniques,  therapeutic activities to: increase activity tolerance, increase dynamic sit/ stand balance during functional activity , increase postural control, increase trunk control and increase OOB/ sitting tolerance   Patient agreeable to OT treatment session, upon arrival patient was found supine in bed  In comparison to previous session, patient with improvements in participation in ADL's today  Patient still requiring mod/max assist for LB ADL's but with some improvement in attempts to improve performance  Patient still requiring significant assist to complete functional transfers, consistently requiring mod assist to achieve stance  Patient with increased cognition today and able to engage in full session but still requiring increased time to process command sand increased verbal cueing to sequence tasks and for safety during mobility and transfers  Patient requiring verbal cues for safety, verbal cues for correct technique, cognitive assistance to anticipate next step, one step directives and ocassional safety reminders  Patient continues to be functioning below baseline level, occupational performance remains limited secondary to factors listed above and increased risk for falls and injury  From OT standpoint, recommendation at time of d/c would be Post acute rehabilitation services     Patient to benefit from continued Occupational Therapy treatment while in the hospital to address deficits as defined above and maximize level of functional independence with ADLs and functional mobility  Patient not evaluated by SLP while in acute hospital setting  However, patient would likely benefit from SLP evaluation while on ARC  CURRENT GAP IN FUNCTION  Prior to Admission: Functional Status: Patient was independent with mobility/ambulation, transfers, ADL's, IADL's  Estimated length of stay: 10 to 14 days    Anticipated Post-Discharge Disposition/Treatment  Disposition: Return to previous home/apartment  Outpatient Services: Physical Therapy (PT), Occupational Therapy (OT) and Speech Therapy    BARRIERS TO DISCHARGE  Weakness, Pain, Diminished cognition/Mentation change, Balance Difficulty, Fatigue, Home Accessibility, Caregiver Accessibility, Financial Resources, Equipment Needs and Resource Availability    INTERVENTIONS FOR DISCHARGE  Adaptive equipment, Patient/Family/Caregiver Education, [employer], Support Group, Financial Assistance, Arrange DME needs, Medication Changes as per MD recommendations, Therapy exercises, Center of balance support  and Energy conservation education     REQUIRED THERAPY:  Patient will require PT, OT and ST 60 minutes each per day, five days per week to achieve rehab goals       REQUIRED FUNCTIONAL AND MEDICAL MANAGEMENT FOR INPATIENT REHABILITATION:  Skin:  There are no pressure sores currently, left buttocks ecchymosis/abrasion, Pain Management: Overall pain is well controlled, Deep Vein Thrombosis (DVT) Prophylaxis:  ASA 81mg PO daily & Eliquis 5mg PO BID, Diabetes Management: continue sliding scale insulin, patient to do finger sticks as ordered, SLIM to continue to manage diabetes, and further IM management of additional medical conditions while on the [facility], she needs PT/OT/ST intervention, patient/family education and training, possible Neuropsych and Neurology consults with any other needed consults prn, nursing medication review and management of bowel/bladder function  Patient/family can participate in unit based stroke education  RECOMMENDED LEVEL OF CARE:  Patient presented to [address] [place] [address] on 1/3/2022 as a level B trauma s/p multiple falls at home  Patient states she did hit her head during several falls, however denies LOC  Patient with complaints of right ankle pain  Right ankle x-ray was negative  CTH and CT cervical spine was negative for acute findings  CT of the chest/abdomen/pelvis showed suspected small post traumatic hematoma measuring 2cm in soft tissues of right buttock/perianal region; no active bleeding; scattered groundglass opacities in both lungs  COVID test resulted negative on 1/3/2022  Patient was continued on Eliquis and initiated on multimodal pain regimen for acute pain management  SLIM was consulted regarding suspected toxic metabolic encephalopathy, as patient with noted worsening confusion  Urinalysis was abnormal, and patient was initiated on Cipro, then transitioned to Rocephin  Of note, patient with recent Cdiff + October 2021, and was continued on prophylactic Vanco in setting of antibiotics for UTI  Urine culture showed E coli and patient was transitioned back to PO Keflex to complete 5 days treatment  Neurology was consulted, as MRI of the brain showed right basal ganglia/caudate infarct/ischemia  Likely etiology Afib/hypercoagulable state from malignancy  ECHO showed an EF of 60%, grade 1 abnormal relaxation, concentric remodeling, mild AVR  Repeat MRI of the brain on 1/5 showed no enhancing lesions  Per Neurology and Oncology, patient was continued on Eliquis, with addition of ASA due to concern of thrombosis caused by chemotherapy  EEG was also obtained, which showed nonspecific slowing and no seizure activity   Suspected encephalopathy Additionally, patient was with noted new onset urinary retention and required bahena insertion on 1/8/2022  Of note, patient with history of metastatic lung cancer, and receives chemotherapy every 3 weeks  This is currently on hold and will not be administered while on ARC  Prior to patient's admission, patient was fully Independent with ADLs and IADLs, including driving  Patient was Independent without use of AD for mobility  Currently, patient is Min assist with use of RW for gait and transfers, and Min/Supervision for UB ADLs, Mod/Max assist for LB ADLs  Close medical management and PM&R management is recommended at this time while patient is on the [facility] acute rehab is recommended for patient to maximize overall strength and mobility upon discharge to home with support of family

## 2022-01-11 NOTE — CASE MANAGEMENT
Case Management Progress Note    Patient name Hardeep Reinoso  Location S /S -01 MRN [id number]  : 1950 Date 2022       LOS (days): 7  Geometric Mean LOS (GMLOS) (days): 4 40  Days to GMLOS:-2 4        OBJECTIVE:        Current admission status: Inpatient  Preferred Pharmacy: No Pharmacies Listed  Primary Care Provider: Bryanna Sheridan DO    Primary Insurance: MEDICARE  Secondary Insurance: AARP    PROGRESS NOTE:    CM was notified via ECIN of [address] 1717 [facility] transport time to [address] [place] [address]  CM updated SLIM, Admissions of [address] [facility] via TT,  Nurse, left voicemail with daughter Romayne Pesa via telephone

## 2022-01-11 NOTE — ASSESSMENT & PLAN NOTE
· Patient's initial reason for admission was multiple falls and intermittent confusion  · CTH negative  · MRI: Right basal ganglia and right caudate acute ischemia  · Repeat MRI of the brain with gadolinium and MRA imaging both noted to be unremarkable  · Continue Lipitor  · Echo unremarkable  · Patient with known h/o Afib  · Continue Eliquis AC   Added aspirin per her oncologist Dr Nilsa Walter for concern of thrombosis caused by chemotherapy  · PT/OT evals noted recommending rehab - [facility]  · No longer need permissive HTN - BP now improved

## 2022-01-11 NOTE — H&P
PHYSICAL MEDICINE AND REHABILITATION H&P/ADMISSION NOTE  Diane Aguiar 66 y o  female MRN: [id number]  Unit/Bed#: -01 Encounter: [id number]     Rehab Diagnosis: Impairment of mobility, safety, Activities of Daily Living (ADLs), and cognitive/communication skills due to Stroke:  01 1  Left Body Involvement (Right Brain)  Right basal ganglia CVA    History of Present Illness:   Diane Aguiar is a 66 y o  female with known stage 4 juan cancer undergoing chemotherapy every 3 weeks, A fib on Eliquis, HTN, HLD, CHF (grade 2 DD), GERD, CKD3, DM2, bilateral knee osteoarthritis, depression/anxiety who presented to the [facility] [facility] on 1/3/22 due to 4 falls and leg contusion  Patient found to have a right buttock hematoma which remained stable  Patient developed altered mental status  CTH negative  MRI brain ultimately revealed a acute right basal ganglia ischemic CVA  MRA/MRV negative for occlusive disease  MRI brain with contrast negative for any enhancing lesions  Patient seen by Neurology and determined etiology of CVA small vessel disease  Aspirin was added to her secondary CVA ppx  Medically, treated for UTI with Ceftriaxone and Keflex and has completed treatment  Also required a Obnner placement on 1/8/22 for urinary retention,  Patient had diarrhea transiently which improved  Patient's BP medications were adjusted to allow for perimissive HTN post CVA  After medical stabilization, patient was found to have acute functional deficits from her baseline in mobility, self care, and cognition and was admitted to [facility] for acute inpatient rehabilitation  Plan:     Rehabilitation   Functional deficits: left leg weakness, bilateral proximal musculature weakness, impaired proprioception/sensation in feet, mild left dysmetria, impaired balance, impaired mobility, self care, impaired cognition   Begin PT/OT/SLP    Rehabilitation goals are to achieve a Supervision - Mod I level with mobility and self care  Prognosis is fair-good  ELOS is 2-3 weeks  Estimated discharge is home  DVT prophylaxis   Managed on Eliquis    Pain   No major issues    Bladder plan   +Bonner cathter in place - TOV in 1-2 days     Bowel plan   Continent   Was having loose stools - she states this is chronic post chemotherapy    Code Status   DNR/DNI confirmed with patient       * Acute ischemic stroke ([facility] )  Assessment & Plan  Presented with 4 falls and mental status change  MRI brain confirmed a right basal ganglia and right caudate ischemic CVA  Etiology: Small vessel disease  Secondary CVA ppx: managed on Eliquis 5mg BID, Lipitor 80mg daily, and now aspirin 81mg daily  Continue CVA education while at [address] [place] [address] education of modifiable risk factors  Monitor mood  Follow-up with Neurology as outpatient     Neuropathy due to chemotherapeutic drug [facility])  Assessment & Plan  Not painful  Consider topical lidoderm patch or gabapentin if pain develops    Chemotherapy-induced thrombocytopenia  Assessment & Plan  Plts = 167K  Continue to monitor    GERD (gastroesophageal reflux disease)  Assessment & Plan  Managed on Protonix (therapeutic exchange for Prilosec)    Diastolic congestive heart failure (HCC)  Assessment & Plan  Seen on previous echocardiogram  Continue Lasix 40mg daily (was taking up to 40 mg BID)  Patient currently euvolemic   IM consultants following and managing   Follows with Dr Ruchi Coughlin    UTI (urinary tract infection)  Assessment & Plan  Diagnosed in acute care  Urine culture + E  Coli   Treated with 5 days antibiotics Ceftriaxone and Keflex  Also was on oral vancomycin to prevent recurrent C   Diff  Asymptomatic   Currently with Bonner catheter for urinary retention - TOV during ARC    Urinary retention  Assessment & Plan  Likely related to UTI  Bonner catheter inserted 1/8/22  TOV during ARC    Ground glass opacity present on imaging of lung  Assessment & Plan  Found incidentally on CT Chest  COVID - 19 negative   PCP to follow as outpatient     Atrial fibrillation [facility])  Assessment & Plan  Rate/rhythm controlled on Lopressor 25mg Q12hrs and Sotolol 80mg BID  Anticoagulated on Eliquis 5mg BID  Stable  IM consultants following while at [place] [address] with Dr Deshaun Vicente on home regimen of Buspar 7 5mg BID and Effexor XR 37 5 QHS  Mood is stable  Consult neuropsychology if needed    Diabetes type 2, controlled [facility])  Assessment & Plan  Lab Results   Component Value Date    HGBA1C 5 8 (H) 01/05/2022     Managed here on SSI  At home was on Metformin and Tradjenta  Continue CCD  Nutrition consulted   IM consultants following and managing     Hypertension  Assessment & Plan  Managed here on Norvasc 5mg BID, Lopressor 25mg Q12h, Losartan 50mg daily, and Lasix 40mg daily  At home on same regimen as above  BP stable   Follows with Dr Harrison Tian [facility])  Assessment & Plan  Known stage 4 adenocarcinoma of lung   · Follows with Dr Jonathan Bowen  · [facility] Chemotherapy every 3 weeks  Last infusion was 12/27/21  Missed her 1/7/22 infusion due to hospitalization  · Follow-up with Dr Jonathan Bowen as outpatient   CT Chest: Scattered groundglass opacities in both lungs which are new from prior exam   Consider infectious or inflammatory etiologies including Covid viral pneumonia  Previous right lower lobe lung mass has diminished since July  Previous left upper lobe tumor has also decreased in size but appears more atelectatic  Increasing water attenuation pleural fluid, partially loculated pleural fluid in the left lower lung  Follow-up per cancer imaging protocol  Contusion of left lower leg  Assessment & Plan  Sustained during fall  X-ray of right ankle is negative for fracture, however showing linear calcifications associated with the plantar fascia and Achilles tendon, these are chronic      Buttocks Hematoma  Assessment & Plan  Sustained during fall  CT showing Suspected small post traumatic hematoma measuring 2 cm in the soft tissues of the right buttock/perianal region  No active bleeding  Stable H&H  No overt pain complaints       Subjective:   Patient seen face to face  Able to tell me her hospital stay acurately  Oriented to person and place  Wasn't sure of day or date  Denies pain or headaches  Feeling thirsty  Has some tingling in feet  Feels her balance may be slightly altered post CVA  Review of Systems   Constitutional: Negative  HENT: Negative  Eyes: Negative  Respiratory: Negative  Cardiovascular: Negative  Gastrointestinal: Negative  Endocrine: Negative  Genitourinary: Negative  Musculoskeletal: Negative  Skin: Negative  Allergic/Immunologic: Negative  Neurological: Negative  Hematological: Negative  Psychiatric/Behavioral: Negative  Function:  Prior level of function and living situation:  Patient resides in [place] in split level home with 0STE and 7 steps to kitchen level  However also has an elevator present  Patient lives with her brother who is a primary power wheelchair user (he has limb/girdle dystrophy)  Patient is the primary care giver for her brother  At this time her cousin is helping her brother  She has a supportive son and daughter who live in [place] and [place] respectively  She also has supportive friends  PLOF: Independent with mobility and self care      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     Current level of function:  Physical Therapy:   Occupational Therapy:  Speech Therapy:    Physical Exam:  /58 (BP Location: Left arm)   Pulse 60   Temp 97 8 °F (36 6 °C) (Tympanic)   Resp 16   Ht 5' 4" (1 626 m)   Wt 74 9 kg (165 lb 2 oz)   SpO2 92%   BMI 28 34 kg/m²        Intake/Output Summary (Last 24 hours) at 1/11/2022 2104  Last data filed at 1/11/2022 1756  Gross per 24 hour   Intake 200 ml   Output --   Net 200 ml       Body mass index is 28 34 kg/m²  Physical Exam  Vitals and nursing note reviewed  Constitutional:       General: She is not in acute distress  HENT:      Head: Normocephalic and atraumatic  Nose: Nose normal       Mouth/Throat:      Mouth: Mucous membranes are moist    Eyes:      Conjunctiva/sclera: Conjunctivae normal    Cardiovascular:      Rate and Rhythm: Normal rate and regular rhythm  Pulses: Normal pulses  Pulmonary:      Effort: Pulmonary effort is normal       Breath sounds: Normal breath sounds  No wheezing or rales  Abdominal:      General: Bowel sounds are normal  There is no distension  Palpations: Abdomen is soft  Tenderness: There is no abdominal tenderness  Musculoskeletal:         General: No swelling  Cervical back: Neck supple  Skin:     General: Skin is warm  Neurological:      Mental Status: She is alert  Comments: Oriented to person and place  Not sure of time/date  Following simple commands  Processing is slow  Calculation intact  Recall 2/3  Sensation to light touch impaired in feet slightly - suspect from chemotherapy  Motor:   Left UE: 4/5 throughout  Right UE: 4/5 throghout  Left LE: 3-/5 HF, 3/5 KE, KF, 4-/5 DF, EHL, PF  Right LE: 3/5 HF, 4-/5 KE, KF, 4/5 DF, EHL, PF  Very mild left sided dysmetria on heel - shin   Psychiatric:         Mood and Affect: Mood normal             Labs, medications, and imaging personally reviewed      Laboratory:    Lab Results   Component Value Date    SODIUM 137 01/11/2022    K 4 2 01/11/2022     01/11/2022    CO2 29 01/11/2022    BUN 30 (H) 01/11/2022    CREATININE 1 70 (H) 01/11/2022    GLUC 158 (H) 01/11/2022    CALCIUM 9 3 01/11/2022     Lab Results   Component Value Date    WBC 12 51 (H) 01/07/2022    HGB 11 5 01/07/2022    HCT 36 0 01/07/2022    MCV 90 01/07/2022     01/07/2022     Lab Results   Component Value Date    INR 1 13 01/03/2022    PROTIME 14 5 01/03/2022         Current Facility-Administered Medications:   acetaminophen (TYLENOL) tablet 650 mg, 650 mg, Oral, Q6H PRN, Dale Petty MD    amLODIPine (NORVASC) tablet 5 mg, 5 mg, Oral, BID, Dale Petty MD    apixaban (ELIQUIS) tablet 5 mg, 5 mg, Oral, BID, Dale Petty MD, 5 mg at 01/11/22 1722    [START ON 1/12/2022] aspirin (ECOTRIN LOW STRENGTH) EC tablet 81 mg, 81 mg, Oral, Daily, Dale Petty MD    atorvastatin (LIPITOR) tablet 80 mg, 80 mg, Oral, Daily With Adry Stanton MD, 80 mg at 01/11/22 1722    bisacodyl (DULCOLAX) rectal suppository 10 mg, 10 mg, Rectal, Daily PRN, Dale Petty MD    busPIRone (BUSPAR) tablet 7 5 mg, 7 5 mg, Oral, BID, Dale Petty MD    [START ON 1/12/2022] cholecalciferol (VITAMIN D3) tablet 2,000 Units, 2,000 Units, Oral, Daily, MD Rigo Chin  [START ON 1/12/2022] cyanocobalamin (VITAMIN B-12) tablet 500 mcg, 500 mcg, Oral, Daily, Dale Petty MD    docusate sodium (COLACE) capsule 100 mg, 100 mg, Oral, BID PRN, MD Rigo Chin  [START ON 4/22/4041] folic acid (FOLVITE) tablet 1 mg, 1 mg, Oral, Daily, MD Rigo Chin  [START ON 1/12/2022] furosemide (LASIX) tablet 40 mg, 40 mg, Oral, Daily, Dale Petty MD    insulin lispro (HumaLOG) 100 units/mL subcutaneous injection 1-6 Units, 1-6 Units, Subcutaneous, TID AC, 2 Units at 01/11/22 1726 **AND** Fingerstick Glucose (POCT), , , TID AC, Dale Petty MD    [START ON 1/12/2022] loratadine (CLARITIN) tablet 10 mg, 10 mg, Oral, Daily, MD Rigo Chin  [START ON 1/12/2022] losartan (COZAAR) tablet 50 mg, 50 mg, Oral, Daily, Dale Petty MD    melatonin tablet 6 mg, 6 mg, Oral, HS, Dale Petty MD    metoprolol tartrate (LOPRESSOR) tablet 25 mg, 25 mg, Oral, Q12H STEPHIE, Dale Petty MD    ondansetron (ZOFRAN-ODT) dispersible tablet 4 mg, 4 mg, Oral, Q6H PRN, MD Rigo Chin  [START ON 1/12/2022] pantoprazole (PROTONIX) EC tablet 40 mg, 40 mg, Oral, Early Morning, Dlae Petty MD    polyethylene glycol (MIRALAX) packet 17 g, 17 g, Oral, Daily PRN, Promise Edwards MD    sotalol (BETAPACE) tablet 80 mg, 80 mg, Oral, BID, Promise Edwards MD, 80 mg at 01/11/22 1722    vancomycin (VANCOCIN) oral solution 125 mg, 125 mg, Oral, Q12H STEPHIE, Promise Edwards MD    venlafaxine (EFFEXOR-XR) 24 hr capsule 37 5 mg, 37 5 mg, Oral, HS, Promise Edwards MD       Allergies   Allergen Reactions    Amoxicillin Hives      Patient Active Problem List    Diagnosis Date Noted    Acute ischemic stroke ([place] [address] ) 01/05/2022    UTI (urinary tract infection) 64/60/8590    Diastolic congestive heart failure ([place] [address] ) 01/11/2022    GERD (gastroesophageal reflux disease) 01/11/2022    Chemotherapy-induced thrombocytopenia 01/11/2022    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[place] [address] ) 01/11/2022    Encephalopathy 01/06/2022    Urinary retention 01/06/2022    Confusion     Hypomagnesemia 01/04/2022    Ground glass opacity present on imaging of lung 01/04/2022    Buttocks Hematoma 01/03/2022    Contusion of left lower leg 01/03/2022    Lung cancer ([place] [address] ) 01/03/2022    Chronic kidney disease, stage 3 ([place] [address] ) 01/03/2022    Hypertension 01/03/2022    Diabetes type 2, controlled ([place] [address] ) 01/03/2022    Depression 01/03/2022    Hypokalemia 01/03/2022    Atrial fibrillation ([place] [address] ) 01/03/2022     History reviewed  No pertinent past medical history  History reviewed  No pertinent surgical history    Social History     Socioeconomic History    Marital status: Single     Spouse name: Not on file    Number of children: Not on file    Years of education: Not on file    Highest education level: Not on file   Occupational History    Not on file   Tobacco Use    Smoking status: Never Smoker    Smokeless tobacco: Never Used   Vaping Use    Vaping Use: Never used   Substance and Sexual Activity    Alcohol use: Not Currently    Drug use: Never    Sexual activity: Not Currently   Other Topics Concern    Not on file   Social History Narrative    Not on file     Social Determinants of Health     Financial Resource Strain: Not on file   Food Insecurity: Not on file   Transportation Needs: Not on file   Physical Activity: Not on file   Stress: Not on file   Social Connections: Not on file   Intimate Partner Violence: Not on file   Housing Stability: Not on file     Social History     Tobacco Use   Smoking Status Never Smoker   Smokeless Tobacco Never Used     Social History     Substance and Sexual Activity   Alcohol Use Not Currently     History reviewed  No pertinent family history  Medical Necessity Criteria for [facility] Admission: Acute Kidney Injury, Chronic Kidney Disease, Leukocystosis, Thrombocytopenia, Urinary Tract Infection (UTI), Urinary retention and CVA education, Bonner catheter in place, HTN, loose stools  In addition, the preadmission screen, post-admission physical evaluation, overall plan of care and admissions order demonstrate a reasonable expectation that the following criteria were met at the time of admission to the [facility]  1  The patient requires active and ongoing therapeutic intervention of multiple therapy disciplines (physical therapy, occupational therapy, speech-language pathology, or prosthetics/orthotics), one of which is physical or occupational therapy  2  Patient requires an intensive rehabilitation therapy program, as defined in Chapter 1, section 110 2 2 of the CMS Medicare Policy Manual  This intensive rehabilitation therapy program will consist of at least 3 hours of therapy per day at least 5 days per week or at least 15 hours of intensive rehabilitation therapy within a 7 consecutive day period, beginning with the date of admission to the [facility]  3  The patient is reasonably expected to actively participate in, and benefit significantly from, the intensive rehabilitation therapy program as defined in Chapter 1, section 110 2 2 of the CMS Medicare Policy Manual at this time of admission to the [facility]   She can reasonably be expected to make measurable improvement (that will be of practical value to improve the patients functional capacity or adaptation to impairments) as a result of the rehabilitation treatment, as defined in section 110 3, and such improvement can be expected to be made within the prescribed period of time  As noted in the CMS Medicare Policy Manual, the patient need not be expected to achieve complete independence in the domain of self-care nor be expected to return to his or her prior level of functioning in order to meet this standard  4  The patient must require physician supervision by a rehabilitation physician  As such, a rehabilitation physician will conduct face-to-face visits with the patient at least 3 days per week throughout the patients stay in the [facility] to assess the patient both medically and functionally, as well as to modify the course of treatment as needed to maximize the patients capacity to benefit from the rehabilitation process  5  The patient requires an intensive and coordinated interdisciplinary approach to providing rehabilitation, as defined in Chapter 1, section 110 2 5 of the CMS Medicare Policy Manual  This will be achieved through periodic team conferences, conducted at least once in a 7-day period, and comprising of an interdisciplinary team of medical professionals consisting of: a rehabilitation physician, registered nurse,  and/or , and a licensed/certified therapist from each therapy discipline involved in treating the patient  Changes Since Pre-admission Assessment: None -This patient's participation in rehab continues to be reasonable, necessary and appropriate  CMS Required Post-Admission Physician Evaluation Elements  History and Physical, including medical history, functional history and active comorbidities as in above text       Post-Admission Physician Evaluation:  The patient has the potential to make improvement and is in need of physical, occupational, and/or therapy services  The patient may also need nutritional services  Given the patient's complex medical condition and risk of further medical complications, rehabilitative services cannot be safely provided at a lower level of care, such as a skilled nursing facility  I have reviewed the patient's functional and medical status at the time of the preadmission screening and they are the same as on the day of this admission  I acknowledge that I have personally performed a full physical examination on this patient within 24 hours of admission  The patient and/or family demonstrated understanding the rehabilitation program and the discharge process after we discussed them  Agree in entirety: yes  Minor adaptions: none    Major changes: none    Moon Mejia MD    ** Please Note: Fluency Direct voice to text software may have been used in the creation of this document   **

## 2022-01-11 NOTE — PLAN OF CARE
Problem: MOBILITY - ADULT  Goal: Maintain or return to baseline ADL function  Description: INTERVENTIONS:  -  Assess patient's ability to carry out ADLs; assess patient's baseline for ADL function and identify physical deficits which impact ability to perform ADLs (bathing, care of mouth/teeth, toileting, grooming, dressing, etc )  - Assess/evaluate cause of self-care deficits   - Assess range of motion  - Assess patient's mobility; develop plan if impaired  - Assess patient's need for assistive devices and provide as appropriate  - Encourage maximum independence but intervene and supervise when necessary  - Involve family in performance of ADLs  - Assess for home care needs following discharge   - Consider OT consult to assist with ADL evaluation and planning for discharge  - Provide patient education as appropriate  Outcome: Progressing  Goal: Maintains/Returns to pre admission functional level  Description: INTERVENTIONS:  - Perform BMAT or MOVE assessment daily    - Set and communicate daily mobility goal to care team and patient/family/caregiver  - Collaborate with rehabilitation services on mobility goals if consulted  - Perform Range of Motion 3 times a day  - Reposition patient every 2 hours    - Dangle patient 3 times a day  - Stand patient 2 times a day  - Ambulate patient 2 times a day  - Out of bed to chair 3 times a day   - Out of bed for meals 3 times a day  - Out of bed for toileting  - Record patient progress and toleration of activity level   Outcome: Progressing

## 2022-01-11 NOTE — DISCHARGE INSTR - AVS FIRST PAGE
Please do a voiding trial at rehab in 1 week    If this fails, consult Urology at rehab or schedule an outpatient appointment after replacing Bonner    Please follow-up with Dr Sam after rehab to discuss plans for change in chemotherapy regimen moving forward

## 2022-01-11 NOTE — PLAN OF CARE
Problem: MOBILITY - ADULT  Goal: Maintain or return to baseline ADL function  Description: INTERVENTIONS:  -  Assess patient's ability to carry out ADLs; assess patient's baseline for ADL function and identify physical deficits which impact ability to perform ADLs (bathing, care of mouth/teeth, toileting, grooming, dressing, etc )  - Assess/evaluate cause of self-care deficits   - Assess range of motion  - Assess patient's mobility; develop plan if impaired  - Assess patient's need for assistive devices and provide as appropriate  - Encourage maximum independence but intervene and supervise when necessary  - Involve family in performance of ADLs  - Assess for home care needs following discharge   - Consider OT consult to assist with ADL evaluation and planning for discharge  - Provide patient education as appropriate  Outcome: Progressing  Goal: Maintains/Returns to pre admission functional level  Description: INTERVENTIONS:  - Perform BMAT or MOVE assessment daily    - Set and communicate daily mobility goal to care team and patient/family/caregiver     - Collaborate with rehabilitation services on mobility goals if consulted  - Out of bed for toileting  - Record patient progress and toleration of activity level   Outcome: Progressing     Problem: Prexisting or High Potential for Compromised Skin Integrity  Goal: Skin integrity is maintained or improved  Description: INTERVENTIONS:  - Identify patients at risk for skin breakdown  - Assess and monitor skin integrity  - Assess and monitor nutrition and hydration status  - Monitor labs   - Assess for incontinence   - Turn and reposition patient  - Assist with mobility/ambulation  - Relieve pressure over bony prominences  - Avoid friction and shearing  - Provide appropriate hygiene as needed including keeping skin clean and dry  - Evaluate need for skin moisturizer/barrier cream  - Collaborate with interdisciplinary team   - Patient/family teaching  - Consider wound care consult   Outcome: Progressing

## 2022-01-11 NOTE — TREATMENT PLAN
Individualized Plan of [address] [place] [address] 66 y o  female MRN: [id number]  Unit/Bed#: -79 Encounter: [id number]     PATIENT INFORMATION  ADMISSION DATE: 1/11/2022  3:39 PM NINA CATEGORY: Right basal ganglia CVA   ADMISSION DIAGNOSIS: Infarction of right basal ganglia ([address] ) [I63 [id number]]  EXPECTED LOS: 3 weeks      MEDICAL/FUNCTIONAL PROGNOSIS  Based on my assessment of the patient's medical conditions and current functional status, the prognosis for attaining medical and functional goals or the IRF stay is:  Fair    Medical Goals: Patient will be medically stable for discharge to [facility] upon completion of rehab program and Patient will be able to manage medical conditions and comorbid conditions with medications and follow up upon completion of rehab program    [address]: Home - Assistance    ANTICIPATED FOLLOW-UP SERVICE:   Home Health Services: PT, OT, SLP and Nursing  verses SNF: PT, OT and SLP     DISCIPLINE SPECIFIC PLANS:  Required Disciplines & Services: Rehabillitation Nursing, Case Management, Dietay/Nutrition and Psychology    REQUIRED THERAPY:  Therapy Hours per Day Days per Week Total Days   Physical Therapy 1 5-6 7   Occupational Therapy 1 5-6 7   Speech/Language Therapy 1 5-6 7       ANTICIPATED FUNCTIONAL OUTCOMES:  ADL:   supervision with LRAD   Bladder/Bowel:   supervision with LRAD   Transfers:  supervision - Mod I with LRAD   Locomotion:   supervision - Mod I with LRAD   Cognitive:   supervision - Mod I with LRAD     DISCHARGE PLANNING NEEDS  Equipment needs: Discharge needs to be reviewed with team      REHAB ANTICIPATED PARTICIPATION RESTRICTIONS:  None

## 2022-01-11 NOTE — ASSESSMENT & PLAN NOTE
· Abnormal CT with ground-glass opacities in bilateral lower lobes  · Patient tested negative for COVID on admission and subsequently repeated on 2 other occasions;  reported no respiratory complaints

## 2022-01-12 ENCOUNTER — APPOINTMENT (INPATIENT)
Dept: CT IMAGING | Facility: HOSPITAL | Age: 72
DRG: 057 | End: 2022-01-12
Payer: MEDICARE

## 2022-01-12 PROBLEM — R53.83 LETHARGY: Status: ACTIVE | Noted: 2022-01-12

## 2022-01-12 PROBLEM — E78.5 DYSLIPIDEMIA: Status: ACTIVE | Noted: 2022-01-12

## 2022-01-12 LAB
ANION GAP SERPL CALCULATED.3IONS-SCNC: 3 MMOL/L (ref 5–14)
BASOPHILS # BLD AUTO: 0.1 THOUSANDS/ΜL (ref 0–0.1)
BASOPHILS NFR BLD AUTO: 1 % (ref 0–1)
BUN SERPL-MCNC: 35 MG/DL (ref 5–25)
CALCIUM SERPL-MCNC: 8.9 MG/DL (ref 8.4–10.2)
CHLORIDE SERPL-SCNC: 101 MMOL/L (ref 97–108)
CO2 SERPL-SCNC: 31 MMOL/L (ref 22–30)
CREAT SERPL-MCNC: 1.76 MG/DL (ref 0.6–1.2)
EOSINOPHIL # BLD AUTO: 0.1 THOUSAND/ΜL (ref 0–0.4)
EOSINOPHIL NFR BLD AUTO: 1 % (ref 0–6)
ERYTHROCYTE [DISTWIDTH] IN BLOOD BY AUTOMATED COUNT: 18.1 %
GFR SERPL CREATININE-BSD FRML MDRD: 28 ML/MIN/1.73SQ M
GLUCOSE P FAST SERPL-MCNC: 164 MG/DL (ref 70–99)
GLUCOSE SERPL-MCNC: 119 MG/DL (ref 65–140)
GLUCOSE SERPL-MCNC: 152 MG/DL (ref 65–140)
GLUCOSE SERPL-MCNC: 164 MG/DL (ref 70–99)
GLUCOSE SERPL-MCNC: 232 MG/DL (ref 65–140)
GLUCOSE SERPL-MCNC: 275 MG/DL (ref 65–140)
HCT VFR BLD AUTO: 35.4 % (ref 36–46)
HGB BLD-MCNC: 11.6 G/DL (ref 12–16)
LYMPHOCYTES # BLD AUTO: 1.1 THOUSANDS/ΜL (ref 0.5–4)
LYMPHOCYTES NFR BLD AUTO: 11 % (ref 25–45)
MCH RBC QN AUTO: 28.4 PG (ref 26–34)
MCHC RBC AUTO-ENTMCNC: 32.6 G/DL (ref 31–36)
MCV RBC AUTO: 87 FL (ref 80–100)
MONOCYTES # BLD AUTO: 0.8 THOUSAND/ΜL (ref 0.2–0.9)
MONOCYTES NFR BLD AUTO: 9 % (ref 1–10)
NEUTROPHILS # BLD AUTO: 7.6 THOUSANDS/ΜL (ref 1.8–7.8)
NEUTS SEG NFR BLD AUTO: 79 % (ref 45–65)
PLATELET # BLD AUTO: 201 THOUSANDS/UL (ref 150–450)
PMV BLD AUTO: 9.8 FL (ref 8.9–12.7)
POTASSIUM SERPL-SCNC: 3.8 MMOL/L (ref 3.6–5)
RBC # BLD AUTO: 4.07 MILLION/UL (ref 4–5.2)
SODIUM SERPL-SCNC: 135 MMOL/L (ref 137–147)
WBC # BLD AUTO: 9.7 THOUSAND/UL (ref 4.5–11)

## 2022-01-12 PROCEDURE — 97535 SELF CARE MNGMENT TRAINING: CPT

## 2022-01-12 PROCEDURE — 82948 REAGENT STRIP/BLOOD GLUCOSE: CPT

## 2022-01-12 PROCEDURE — 80048 BASIC METABOLIC PNL TOTAL CA: CPT | Performed by: PHYSICAL MEDICINE & REHABILITATION

## 2022-01-12 PROCEDURE — 99233 SBSQ HOSP IP/OBS HIGH 50: CPT | Performed by: PHYSICAL MEDICINE & REHABILITATION

## 2022-01-12 PROCEDURE — 97530 THERAPEUTIC ACTIVITIES: CPT

## 2022-01-12 PROCEDURE — 97129 THER IVNTJ 1ST 15 MIN: CPT

## 2022-01-12 PROCEDURE — 70450 CT HEAD/BRAIN W/O DYE: CPT

## 2022-01-12 PROCEDURE — G1004 CDSM NDSC: HCPCS

## 2022-01-12 PROCEDURE — 97130 THER IVNTJ EA ADDL 15 MIN: CPT

## 2022-01-12 PROCEDURE — 97116 GAIT TRAINING THERAPY: CPT

## 2022-01-12 PROCEDURE — 92523 SPEECH SOUND LANG COMPREHEN: CPT

## 2022-01-12 PROCEDURE — 85025 COMPLETE CBC W/AUTO DIFF WBC: CPT | Performed by: PHYSICAL MEDICINE & REHABILITATION

## 2022-01-12 PROCEDURE — 97167 OT EVAL HIGH COMPLEX 60 MIN: CPT

## 2022-01-12 PROCEDURE — 97163 PT EVAL HIGH COMPLEX 45 MIN: CPT

## 2022-01-12 PROCEDURE — 99222 1ST HOSP IP/OBS MODERATE 55: CPT | Performed by: INTERNAL MEDICINE

## 2022-01-12 RX ORDER — AMLODIPINE BESYLATE 5 MG/1
5 TABLET ORAL 2 TIMES DAILY
Status: DISCONTINUED | OUTPATIENT
Start: 2022-01-12 | End: 2022-01-14

## 2022-01-12 RX ORDER — LOSARTAN POTASSIUM 25 MG/1
25 TABLET ORAL DAILY
Status: DISCONTINUED | OUTPATIENT
Start: 2022-01-13 | End: 2022-01-21 | Stop reason: HOSPADM

## 2022-01-12 RX ADMIN — FOLIC ACID 1 MG: 1 TABLET ORAL at 08:39

## 2022-01-12 RX ADMIN — VENLAFAXINE HYDROCHLORIDE 37.5 MG: 37.5 CAPSULE, EXTENDED RELEASE ORAL at 21:09

## 2022-01-12 RX ADMIN — INSULIN LISPRO 1 UNITS: 100 INJECTION, SOLUTION INTRAVENOUS; SUBCUTANEOUS at 08:40

## 2022-01-12 RX ADMIN — AMLODIPINE BESYLATE 5 MG: 5 TABLET ORAL at 08:39

## 2022-01-12 RX ADMIN — PANTOPRAZOLE SODIUM 40 MG: 40 TABLET, DELAYED RELEASE ORAL at 06:04

## 2022-01-12 RX ADMIN — SOTALOL HYDROCHLORIDE 80 MG: 80 TABLET ORAL at 08:41

## 2022-01-12 RX ADMIN — SOTALOL HYDROCHLORIDE 80 MG: 80 TABLET ORAL at 17:24

## 2022-01-12 RX ADMIN — LOSARTAN POTASSIUM 50 MG: 50 TABLET, FILM COATED ORAL at 08:39

## 2022-01-12 RX ADMIN — BUSPIRONE HYDROCHLORIDE 7.5 MG: 15 TABLET ORAL at 21:09

## 2022-01-12 RX ADMIN — INSULIN LISPRO 4 UNITS: 100 INJECTION, SOLUTION INTRAVENOUS; SUBCUTANEOUS at 11:24

## 2022-01-12 RX ADMIN — LORATADINE 10 MG: 10 TABLET ORAL at 08:38

## 2022-01-12 RX ADMIN — FUROSEMIDE 40 MG: 40 TABLET ORAL at 08:39

## 2022-01-12 RX ADMIN — Medication 2000 UNITS: at 08:39

## 2022-01-12 RX ADMIN — CYANOCOBALAMIN TAB 1000 MCG 500 MCG: 1000 TAB at 08:39

## 2022-01-12 RX ADMIN — METOPROLOL TARTRATE 25 MG: 25 TABLET, FILM COATED ORAL at 08:38

## 2022-01-12 RX ADMIN — METOPROLOL TARTRATE 25 MG: 25 TABLET, FILM COATED ORAL at 21:08

## 2022-01-12 RX ADMIN — APIXABAN 5 MG: 5 TABLET, FILM COATED ORAL at 17:24

## 2022-01-12 RX ADMIN — ATORVASTATIN CALCIUM 80 MG: 80 TABLET, FILM COATED ORAL at 17:24

## 2022-01-12 RX ADMIN — APIXABAN 5 MG: 5 TABLET, FILM COATED ORAL at 08:40

## 2022-01-12 RX ADMIN — ASPIRIN 81 MG: 81 TABLET, COATED ORAL at 08:38

## 2022-01-12 RX ADMIN — BUSPIRONE HYDROCHLORIDE 7.5 MG: 15 TABLET ORAL at 08:38

## 2022-01-12 RX ADMIN — VANCOMYCIN HYDROCHLORIDE 125 MG: 5 INJECTION, POWDER, LYOPHILIZED, FOR SOLUTION INTRAVENOUS at 08:40

## 2022-01-12 NOTE — PLAN OF CARE
Problem: INFECTION - ADULT  Goal: Absence or prevention of progression during hospitalization  Description: INTERVENTIONS:  - Assess and monitor for signs and symptoms of infection  - Monitor lab/diagnostic results  - Monitor all insertion sites, i e  indwelling lines, tubes, and drains  - Monitor endotracheal if appropriate and nasal secretions for changes in amount and color  - [place] appropriate cooling/warming therapies per order  - Administer medications as ordered  - Instruct and encourage patient and family to use good hand hygiene technique  - Identify and instruct in appropriate isolation precautions for identified infection/condition  Outcome: Progressing     Problem: SAFETY ADULT  Goal: Patient will remain free of falls  Description: INTERVENTIONS:  - Educate patient/family on patient safety including physical limitations  - Instruct patient to call for assistance with activity   - Consult OT/PT to assist with strengthening/mobility   - Keep Call bell within reach  - Keep bed low and locked with side rails adjusted as appropriate  - Keep care items and personal belongings within reach  - Initiate and maintain comfort rounds  - Make Fall Risk Sign visible to staff  - Offer Toileting every  Hours, in advance of need  - Initiate/Maintain alarm  - Obtain necessary fall risk management equipment:  Apply yellow socks and bracelet for high fall risk patients  - Consider moving patient to room near nurses station  Outcome: Progressing     Problem: CARDIOVASCULAR - ADULT  Goal: Maintains optimal cardiac output and hemodynamic stability  Description: INTERVENTIONS:  - Monitor I/O, vital signs and rhythm  - Monitor for S/S and trends of decreased cardiac output  - Administer and titrate ordered vasoactive medications to optimize hemodynamic stability  - Assess quality of pulses, skin color and temperature  - Assess for signs of decreased coronary artery perfusion  - Instruct patient to report change in severity of symptoms  Outcome: Progressing

## 2022-01-12 NOTE — PCC NURSING
Pt is 66 y o  female patient who originally presented to the hospital on 1/3/2022 due to multiple falls resulting in buttocks hematoma and leg contusion for which she was originally admitted under the Trauma Service  Due to complaints of headaches and altered mental status she was seen in consultation by Internal Medicine and was subsequently transferred to our service after an MRI of the brain revealed evidence of an acute ischemic stroke  She was seen in consultation by Neurology and had full workup  She was maintained on Eliquis which she was already on prior to the hospitalization but after discussion with her outpatient oncologist, aspirin was added to her regimen for concerned that she developed the stroke due to thrombosis related to her current chemotherapy regimen for lung cancer  She had initially been seen by PT and OT on admission and was cleared for home with VNA but subsequently did have a decline in her functionality in mental status while here in the hospital and then was recommended for rehab  In fact her hospital stay was quite complicated by alterations and fluctuations in her mental status  While here in the hospital she was treated for urinary tract infection and completed a 5 day course of antibiotics  Due to initial complaints of diarrhea we had requested a C diff sample but patient's diarrhea ceased and a stool sample could never be obtained  In addition she did have urinary retention for which a Bonner catheter had to be placed  Her blood pressure was initially quite severely elevated but given the acute stroke we allowed permissive hypertension initially and then subsequently we did bring her blood pressure under better control  She has underlying chronic kidney disease with some fluctuations but overall stability in her creatinine  She was found to have a ground-glass opacity in her lung on imaging but tested negative for COVID on 3 occasions during this hospital stay    At time of discharge she is overall improved though she does continue to have some episodic lethargy and confusion for which additional neurologic and neuropsych workup would be of benefit once she is medically optimized  [facility]  Acute ischemic stroke managed with Lipitor 80mg daily with dinner, Eliquis 5mg BID & ASA 81mg daily  HTN managed with  Lopressor 25mg q12h  Pt had bahena catheter for urinary retention which was d/c'd last week and pt has been voiding eversince without difficulty  Atrial fib managed with Sotolol 80mg BID, Lopressor 25mg q12h & Eliquis 5mg BID  DM2 managed with diabetic diet & accu checks with SSI's before meals and Januvia 25 mg daily  Pt is on Voltaren gel for her L knee pain 3x daily but pt sometimes refuse if she has no pain  This week we will monitor pt's vital signs & lab results  Pt is disoriented to time & will be reoriented to time & situation with each encounter  Pt will have safe transfers with use of call bell, hourly rounding & bed/chair alarm to prevent falls  Pt uses call bell appropriately & has not challenged bed alarm  Pt will be educated on importance of T/R & off loading weight while in bed & chair to prevent pressure injury  Pt will have adequate pain relief to fully participate in therapies  Pt denies pain @ present  Pt will also be educated on energy conservation as we encourage independence with ADL's

## 2022-01-12 NOTE — ASSESSMENT & PLAN NOTE
Sustained during fall  X-ray of right ankle is negative for fracture, however showing linear calcifications associated with the plantar fascia and Achilles tendon, these are chronic

## 2022-01-12 NOTE — ASSESSMENT & PLAN NOTE
1/3 - admitted with AMS and multiple falls  CT head on 1/3: No acute intracranial abnormalities  MRI brain on 1/4: R basal ganglia and R caudate acute ischemia  MRI brain with contrast on 1/5: No enhancing lesions  Repeat CT head on 1/6: Evolving R basal ganglia infarct  No hemorrhage or significant mass effect  ECHO on 1/5 - EF 60% with grade 1 relaxation  EEG on 1/6 - Non-specific  Continue Lipitor 80mg daily and ASA 81mg daily  Likely related to small vessel disease  Maintain normotension  Neurovascular checks Q shift  Follow-up with Neurology as outpatient  PT/OT/Speech therapies  Primary team following

## 2022-01-12 NOTE — QUICK NOTE
Patient with brief period of time of mental status change  Personally seen and examined - patient neurologic exam consistent with yesterday's examination without new focal findings  VS stable     Discussed with IM consultant  Will check [facility] as patient is now on eliquis and aspirin and has a history of 4 falls prior to admission  Labs reviewed this AM  Discontinued Melatonin  Neurochecks today per shift  Will update daughter additionally      Maxi Bunch MD  PM&R

## 2022-01-12 NOTE — PROGRESS NOTES
01/12/22 0700   Patient Data   Rehab Impairment  Impairment of mobility, safety, Activities of Daily Living (ADLs), and cognitive/communication skills due to Stroke:  01 4  No paresis   Etiologic Diagnosis R basal ganglia infart   Date of Onset 01/03/22   Support System   Name Sammy Caldera  (pt is main caregiver, musculardystrophy)   Relationship brother   Able to provide 24 hour supervision No   Able to provide physical help? No   Multiple Support Systems Yes   Support System 2   Name 2 Helena Gonzalez  (Fabby Rodriguez has been home as well to A at [place])   Relationship 2 daughter   Able to provide 24 hour supervision 2 Yes   Able to provide physical help? (2) Yes   Home Setup   Type of Home Split Level   Number of Stairs 0   Number of Stairs in Home 6   In Home Hand Rail Bilateral   First Floor Bathroom Full; Shower;Door;Grab Bars   First Floor Bathroom Accessibility Shower chair   Second Floor Bathroom Full;Tub  (only brother uses this BR because it has lift)   First Floor Setup Available Yes   Home Modifications Necessary?   (pending progress)   Home Modification Comment pending progress   Available Equipment Shower Chair;Roller Walker  (mechanical lift (brother uses))   Baseline Information   Vocation Other  (retired)   Transportation    Prior Device(s) Used Shower Chair  ([address] in shower)   Prior IADL Participation   Money Management   (brother completes)   Meal Preparation Full Participation   Laundry Full Participation   Home Cleaning Full Participation   Prior Level of Function   Self-Care 3  Independent - Patient completed the activities by him/herself, with or without an assistive device, with no assistance from a helper  Indoor-Mobility (Ambulation) 3  Independent - Patient completed the activities by him/herself, with or without an assistive device, with no assistance from a helper  Stairs 3   Independent - Patient completed the activities by him/herself, with or without an assistive device, with no assistance from a helper  Functional Cognition 3  Independent - Patient completed the activities by him/herself, with or without an assistive device, with no assistance from a helper  Prior Assistance Needed for Money Management  (uses pill box for med mngmnt)   Falls in the Last Year   Number of falls in the past 12 months 6  (per pt)   Type of Injury Associated with Fall Injury   Patient Preference   Nickname (Patient Preference) Jesuslauren Adriano   Psychosocial   Psychosocial (WDL) X   Patient Behaviors/Mood Congruent; Cooperative;Flat affect;Calm   Restrictions/Precautions   Precautions Bed/chair alarms;Cognitive; Fall Risk; [place]hena; Visual deficit;Aspiration   Weight Bearing Restrictions No   ROM Restrictions No   Pain Assessment   Pain Score No Pain   Eating Assessment   Type of Assistance Needed Set-up / clean-up   Physical Assistance Level No physical assistance   Eating CARE Score 5   Oral Hygiene   Type of Assistance Needed Supervision   Physical Assistance Level No physical assistance   Comment seated at sink pt able to complete   Oral Hygiene CARE Score 4   Grooming   Able To Comb/Brush Hair;Wash/Dry Face;Brush/Clean Teeth;Wash/Dry Hands   Limitation Noted In Strength;Timeliness   Tub/Shower Transfer   Limitations Noted In Balance; Endurance;LE Strength;UE Strength   Adaptive Equipment Grab Bars;Seat with Back   Assessed Shower   Findings modA for safe side stepping transfer   Shower/Bathe Self   Type of Assistance Needed Physical assistance   Physical Assistance Level 26%-50%   Comment compelted seated for most of shower  req Reta in stance when washing carolyn/buttock area   therapist completed bahena care this session   Shower/Bathe Self CARE Score 3   Bathing   Assessed Bath Style Shower   Anticipated D/C Bath Style Shower   Able to Imtiaz Anish No   Able to [employer] Temperature No   Able to Wash/Rinse/Dry (body part) Left Arm;Right Arm;L Upper Leg;R Upper Leg;L Lower Leg/Foot;R Lower Leg/Foot;Chest;Abdomen;Perineal Area; Buttocks   Limitations Noted in Balance; Endurance;Problem Solving; Safety;Strength;Timeliness   Positioning Standing;Seated   Adaptive Equipment Shower Bars;Hand Held Shower; Shower Seat   Dressing/Undressing Clothing   Remove UB Clothes   (gown)   Don UB Clothes Pullover Shirt   Type of Assistance Needed Supervision   Physical Assistance Level No physical assistance   Comment seated   Upper Body Dressing CARE Score 4   Remove LB Clothes Socks   Don LB Clothes Undergarment;Pants;Socks; Shoes   Type of Assistance Needed Physical assistance   Physical Assistance Level 51%-75%   Comment req A for problem solving in threading bahena through pants, Reta ins tance for CM   Lower Body Dressing CARE Score 2   Limitations Noted In Balance; Endurance; Safety;Strength;Timeliness   Positioning Supported Sit;Standing   Putting On/Taking Off Footwear   Type of Assistance Needed Supervision   Physical Assistance Level No physical assistance   Comment xleg method to don socks/sneakers   Putting On/Taking Off Footwear CARE Score 4   Toileting Hygiene   Type of Assistance Needed Physical assistance   Physical Assistance Level 26%-50%   Comment req Reta for balance when compelting buttock hygiene and CM   Toileting Hygiene CARE Score 3   Toilet Transfer   Surface Assessed Standard Commode   Transfer Technique Standard   Limitations Noted In Balance; Endurance;UE Strength;LE Strength   Adaptive Equipment Grab Bar;Walker   Positioning Concerns LE Support;Cognition; Safety   Type of Assistance Needed Physical assistance   Physical Assistance Level 26%-50%   Comment Reta with RW vc to reach back to [facility]   Toilet Transfer CARE Score 3   Toileting   Able to Pull Clothing down yes, up yes  Able to Manage Clothing Closures Yes   Manage Hygiene Bladder; Bowel   Limitations Noted In Balance;ROM;UE Strength;LE Strength   Adaptive Equipment Grab Bar   Transfer Bed/Chair/Wheelchair   Positioning Concerns Skin Integrity;Cognition   Limitations Noted In Balance; Endurance;UE Strength;LE Strength   Adaptive Equipment Roller Walker   Type of Assistance Needed Physical assistance   Physical Assistance Level 26%-50%   Comment Reta SPT with RW with vc for safety and hand placement   Chair/Bed-to-Chair Transfer CARE Score 3   Lying to Sitting on Side of Bed   Type of Assistance Needed Supervision   Physical Assistance Level No physical assistance   Comment with HOB flat, mild use of BR   Lying to Sitting on Side of Bed CARE Score 4   Sit to Stand   Type of Assistance Needed Physical assistance   Physical Assistance Level 25% or less   Comment Reta/CGA with RW, vc for safety and hand placement   Sit to Stand CARE Score 3   Walk 10 Feet   Type of Assistance Needed Physical assistance   Physical Assistance Level 26%-50%   Comment Reta with RW for short fx mobility to/from bathroom   Walk 10 Feet CARE Score 3   Comprehension   Assist Devices Glasses   Auditory Basic   Visual Basic   QI: Comprehension 3  Usually Understands: Understands most conversations, but misses some part/intent of message  Requires cues at times to understand  Comprehension (FIM) 5 - Needs help/cues, repetition only RARELY ( < 10% of the time)   Expression   Verbal Basic   Non-Verbal Basic   Intelligibility Sentence   QI: Expression 3  Exhibits some difficulty with expressing needs and ideas (e g , some words or finishing thoughts) or speech is not clear   Expression (FIM) 4 - Needs to repeat single words   RUE Assessment   RUE Assessment X  (4-/5; hx of frozen shoulder 3-4yrs ago)   LUE Assessment   LUE Assessment X  (4-/5)   Sensation   Light Touch Partial deficits in the RUE;Partial deficits in the RLE  (reports tingling R hand/leg)   Cognition   Overall Cognitive Status Impaired   Arousal/Participation Alert; Responsive; Cooperative   Attention Attends with cues to redirect   Orientation Level Oriented to person  (reports rehab 2* to falling, unable to recall place,1/23/22)   Memory Decreased short term memory;Decreased recall of precautions   Following Commands Follows one step commands with increased time or repetition   Vision   Vision Comments Reports not being able to see clearly last 6 months  No c/o blurry/double vision   Perception   Inattention/Neglect Appears intact   Discharge Information   Vocational Plan Retired/not working   Barriers to Return to [employer]; Endurance  (cognition)   Patient's Discharge Plan "To go home iwth dtr, brother and cousin"   Patient's Rehab Expectations "Have a little bit more stamina"   Barriers to Discharge Home Decreased Strength;Decreased Endurance; Safety Considerations;Decreased Cognitive Function   Impressions Pt is a 66year old female that presented to [address] [place] [address] on 1/3/2022 as a level B trauma s/p multiple falls at home  Patient states she did hit her head during several falls, however denies LOC  Patient with complaints of right ankle pain  Right ankle x-ray was negative  CTH and CT cervical spine was negative for acute findings  CT of the chest/abdomen/pelvis showed suspected small post traumatic hematoma measuring 2cm in soft tissues of right buttock/perianal region; no active bleeding; scattered groundglass opacities in both lungs  COVID test resulted negative on 1/3/2022  Patient was continued on Eliquis and initiated on multimodal pain regimen for acute pain management  SLIM was consulted regarding suspected toxic metabolic encephalopathy, as patient with noted worsening confusion  Urinalysis was abnormal, and patient was initiated on Cipro, then transitioned to Rocephin  Of note, patient with recent Cdiff + October 2021, and was continued on prophylactic Vanco in setting of antibiotics for UTI  Urine culture showed E coli and patient was transitioned back to PO Keflex to complete 5 days treatment   Neurology was consulted, as MRI of the brain showed right basal ganglia/caudate infarct/ischemia  Likely etiology Afib/hypercoagulable state from malignancy  ECHO showed an EF of 60%, grade 1 abnormal relaxation, concentric remodeling, mild AVR  Repeat MRI of the brain on 1/5 showed no enhancing lesions  EEG was also obtained, which showed nonspecific slowing and no seizure activity  Additionally, patient was with noted new onset urinary retention and required bahena insertion on 1/8/2022  Of note, patient with history of metastatic lung cancer, and receives chemotherapy every 3 weeks  Pt supine in bed upon therapist arrival  Pt fatigued with eyes closed while answering home setup questions  At times pt demonstrating to be P historian  Pt reports that dtr is back in school, but when asked what she is studying pt unable to give clear answer and reporting "She is just doing it to get by"  Pt reports she lives in a split level home 0STE and 6 steps inside with HR  Pt reports she was indep with all ADL/IADL tasks and is main caregiver for brother who has muscular dystrophy, she utilized mechanical lift for transfers  Of note, in chart it is stated that pts cousin is staying with them to [place] and that dtr is looking ot hire [employer] for DC, will verify with dtr as well  At this time pt req Reta/modA for ADLs and transfers  Pt does demonstrate dec cognition, dec insight to deficit and dec safety awareness despite educ  Pt seen for 90mins of skilled OT services with emphasis on self-care, transfers, balance, as well as therapist educated pt on rehab process, goal setting and ELOS which pt verbalized understanding   Pt presents with the following performance component deficits impacting ADL/IADL skills:  weakness, impaired balance, decreased endurance, decreased coordination, increased fall risk, new onset of impairment of functional mobility, decreased ADLS, decreased IADLS, decreased activity tolerance, decreased safety awareness, impaired judgement, dec cognition and SOB upon exertion, that result in activity limitations and/or participation restrictions  Pt to continue to benefit from continued acute rehab OT services during hospital stay to address defined deficits and to maximize level of functional independence in the following Occupational Performance areas: grooming, bathing/shower, toilet hygiene, dressing, medication management, functional mobility, community mobility, clothing management, cleaning, meal prep and household maintenance  Treatment approaches may include: OT eval, self-care, there ex, therapeutic activity, modalities  Pt presents with good rehab potential  This evaluation requires clinical decision making of high complexity, because the patient presents with comorbidites that affect occupational performance and required significant modification of tasks or assistance with consideration of multiple treatment options  Pt is unsafe to D/C home at this time, recommending ELOS 2 weeks to achieve S level goals with least restrictive device to address these areas and resume prior occupational roles to maximize independence to reduce risk of fall and decrease risk of readmission  Goals to continue to be assessed and determine if pt will have supervision at [place]          OT Therapy Minutes   OT Time In 0700   OT Time Out 0830   OT Total Time (minutes) 90   OT Mode of treatment - Individual (minutes) 90   OT Mode of treatment - Concurrent (minutes) 0   OT Mode of treatment - Group (minutes) 0   OT Mode of treatment - Co-treat (minutes) 0   OT Mode of Treatment - Total time(minutes) 90 minutes   OT Cumulative Minutes 90   Cumulative Minutes   Cumulative therapy minutes 90

## 2022-01-12 NOTE — ASSESSMENT & PLAN NOTE
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

## 2022-01-12 NOTE — PROGRESS NOTES
SLP TAA       01/12/22 1500   Patient Data   Rehab Impairment  Impairment of mobility, safety, Activities of Daily Living (ADLs), and cognitive/communication skills due to Stroke:  01 4  No paresis   Etiologic Diagnosis R basal ganglia infart   Date of Onset 01/03/22   Support System   Name Jacklyn Hanson   Relationship dtr   Support System 2   Name 2 [place]   Relationship 2 brother   Prior Level of Function   Functional Cognition 3  Independent - Patient completed the activities by him/herself, with or without an assistive device, with no assistance from a helper  Prior Assistance Needed for Money Management   Patient Preference   Nickname (Patient Preference) [facility]   Restrictions/Precautions   Precautions Bed/chair alarms;Cognitive; Fall Risk; [place]; Supervision on toilet/commode;Visual deficit  (? L inattention)   Pain Assessment   Pain Assessment Tool 0-10   Pain Score No Pain   Comprehension   Assist Devices Glasses   Auditory Basic;Complex   Visual Basic;Complex   Findings Pt completing formalized cognitive assessment  Refer to SLP Rehab note for full details  QI: Comprehension 2  Sometimes Understands: Understands only basic conversations or simple, direct phrases  Frequently requires cues to understand   Comprehension (FIM) 3 - Understands basic info/conversation 50-74% of time   Expression   Verbal Basic;Complex   Non-Verbal Basic;Complex   Intelligibility Sentence   Findings Pt completing formalized cognitive assessment  Refer to SLP Rehab note for full details  QI: Expression 3  Exhibits some difficulty with expressing needs and ideas (e g , some words or finishing thoughts) or speech is not clear   Expression (FIM) 4 - Expresses basic info/needs 75-90% of time   Social Interaction   Cooperation with staff   Participation Individual   Behaviors observed Appropriate   Findings Pt completing formalized cognitive assessment  Refer to SLP Rehab note for full details     Social Interaction (FIM) 5 - Interacts appropriately with others 90% of time   Problem Solving   Complex Manages discharge planning;Manages medications   Findings Pt completing formalized cognitive assessment  Refer to SLP Rehab note for full details  Problem solving (FIM) 2 - Needs direction more than ½ time to initiate, plan or complete simple tasks   Memory   Remember Routine No   Initiates Tasks Yes   Short-Term Impaired   Long Term Intact   Recalls Precaution No   Findings Pt completing formalized cognitive assessment  Refer to SLP Rehab note for full details  Memory (FIM) 3 - Recognizes, recalls/performs 50-74%   Discharge Information   Patient's Discharge Plan home w/ family support/supervision   Patient's Rehab Expectations "to get better and be home soon"   Barriers to Discharge Home Limited Family Support;Decreased Cognitive Function;Decreased Strength;Decreased Endurance; Safety Considerations   Impressions Pt is a good rehab candidate to achieve supervision to min A level cognitive linguistic goals through her stay in the acute rehab center w/ anticipated discharge home w/ family support/supervision  Current deficits which impact pt's ability to return home at this time include: decreased comprehension, decreased executive function skills (problem solving, reasoning, sequencing, judgement, insight), decreased ST/working memory, fatigue, slower processing and suspected visual spatial difficulty, which currently impact pt's overall safety and functional mobility  ELOS ~2 weeks  Pt will benefit from skilled SLP services to maximize overall functional independence of cognitive linguistic skills to decrease burden of care for family at time of discharge      SLP Therapy Minutes   SLP Time In 1500   SLP Time Out 1550   SLP Total Time (minutes) 50   SLP Mode of treatment - Individual (minutes) 50   SLP Mode of treatment - Concurrent (minutes) 0   SLP Mode of treatment - Group (minutes) 0   SLP Mode of treatment - Co-treat (minutes) 0   SLP Mode of Treatment - Total time(minutes) 50 minutes   SLP Cumulative Minutes 50   Cumulative Minutes   Cumulative therapy minutes 230

## 2022-01-12 NOTE — ASSESSMENT & PLAN NOTE
Managed on home regimen of Buspar 7 5mg BID and Effexor XR 37 5 QHS  Mood is stable  Consult neuropsychology if needed

## 2022-01-12 NOTE — ASSESSMENT & PLAN NOTE
Lab Results   Component Value Date    HGBA1C 5 8 (H) 01/05/2022     At home was on Metformin and Tradjenta  Here: Ranjan Leiva not formulary  Januvia with plan to transition to home Tradjenta at discharge  Continue CCI, diabetic diet, accuchecks     Nutrition consulted   IM consultants following and managing

## 2022-01-12 NOTE — ASSESSMENT & PLAN NOTE
Sustained during fall  CT showing Suspected small post traumatic hematoma measuring 2 cm in the soft tissues of the right buttock/perianal region    No active bleeding  Stable H&H  No overt pain complaints

## 2022-01-12 NOTE — ASSESSMENT & PLAN NOTE
Continue home regimen of Losartan 50mg daily and metoprolol tartrate 25mg BID  Started on amlodipine 5mg BID in acute setting - continue at this time  Started on Lasix 40mg daily in acute setting - only taking as needed at home  Consider decreasing dose  Losartan restarted on 1/11 - decrease dose to 25mg daily on 1/12 due to lethargy/possible hypoperfusion  Monitor BP with VS   Continue to follow-up with Dr Taylor Lassiter as outpatient

## 2022-01-12 NOTE — PCC OCCUPATIONAL THERAPY
1/19/22  ADL: S for all ADLs  TRANSFER: S for transfers with RW  D/C PLAN: OT goals set for S at IE  During course of day and further evaluation, pt demonstrating severe cog deficits as evidenced by pt scoring 8/30 on MOCA  Dr Terrance Dillard and Therapist spoke with dtr last week who reported 24/7 can be provided  Therapist circled back with dtr on 1/17/22 which dtr reports she will be taking FMLA for 2 weeks and staying with pt  DC 1/21/22, home OT services  Pt barriers include dec cognition, dec balance, dec activity tolerance, dec overall strength, impacting participation in ADL/IADL's  In order to address fx deficits, skilled OT services have worked on self-care, therapeutic exercise, therapeutic activity, modalities PRN in order to inc fx performance and independence  Therapist has discussed POC with pt and areas of focus with pt verbalizing understanding  Barriers Intervention   cognition Education family, 24/7 supervision   Dec activity tolerance Endurance trg   Dec strength TE           1/12/22  Pt is a 66year old female that presented to [facility] on 1/3/2022 as a level B trauma s/p multiple falls at home  Patient states she did hit her head during several falls, however denies LOC  Patient with complaints of right ankle pain  Right ankle x-ray was negative  CTH and CT cervical spine was negative for acute findings  CT of the chest/abdomen/pelvis showed suspected small post traumatic hematoma measuring 2cm in soft tissues of right buttock/perianal region; no active bleeding; scattered groundglass opacities in both lungs  COVID test resulted negative on 1/3/2022  Patient was continued on Eliquis and initiated on multimodal pain regimen for acute pain management  SLIM was consulted regarding suspected toxic metabolic encephalopathy, as patient with noted worsening confusion  Urinalysis was abnormal, and patient was initiated on Cipro, then transitioned to Rocephin   Of note, patient with recent Cdiff + October 2021, and was continued on prophylactic Vanco in setting of antibiotics for UTI  Urine culture showed E coli and patient was transitioned back to PO Keflex to complete 5 days treatment  Neurology was consulted, as MRI of the brain showed right basal ganglia/caudate infarct/ischemia  Likely etiology Afib/hypercoagulable state from malignancy  ECHO showed an EF of 60%, grade 1 abnormal relaxation, concentric remodeling, mild AVR  Repeat MRI of the brain on 1/5 showed no enhancing lesions  EEG was also obtained, which showed nonspecific slowing and no seizure activity  Additionally, patient was with noted new onset urinary retention and required bahena insertion on 1/8/2022  Of note, patient with history of metastatic lung cancer, and receives chemotherapy every 3 weeks  Pt supine in bed upon therapist arrival  Pt fatigued with eyes closed while answering home setup questions  At times pt demonstrating to be P historian  Pt reports that dtr is back in school, but when asked what she is studying pt unable to give clear answer and reporting "She is just doing it to get by"  Pt reports she lives in a split level home 0STE and 6 steps inside with HR  Pt reports she was indep with all ADL/IADL tasks and is main caregiver for brother who has muscular dystrophy, she utilized mechanical lift for transfers  Of note, in chart it is stated that pts cousin is staying with them to A and that dtr is looking ot hire [employer] for DC, will verify with dtr as well  At this time pt req Reta/modA for ADLs and transfers  Pt does demonstrate dec cognition, dec insight to deficit and dec safety awareness despite educ  Pt seen for 90mins of skilled OT services with emphasis on self-care, transfers, balance, as well as therapist educated pt on rehab process, goal setting and ELOS which pt verbalized understanding   Pt presents with the following performance component deficits impacting ADL/IADL skills:  weakness, impaired balance, decreased endurance, decreased coordination, increased fall risk, new onset of impairment of functional mobility, decreased ADLS, decreased IADLS, decreased activity tolerance, decreased safety awareness, impaired judgement, dec cognition and SOB upon exertion, that result in activity limitations and/or participation restrictions  Pt to continue to benefit from continued acute rehab OT services during hospital stay to address defined deficits and to maximize level of functional independence in the following Occupational Performance areas: grooming, bathing/shower, toilet hygiene, dressing, medication management, functional mobility, community mobility, clothing management, cleaning, meal prep and household maintenance  Treatment approaches may include: OT eval, self-care, there ex, therapeutic activity, modalities  Pt presents with good rehab potential  This evaluation requires clinical decision making of high complexity, because the patient presents with comorbidites that affect occupational performance and required significant modification of tasks or assistance with consideration of multiple treatment options  Pt is unsafe to D/C home at this time, recommending ELOS 2 weeks to achieve S level goals with least restrictive device to address these areas and resume prior occupational roles to maximize independence to reduce risk of fall and decrease risk of readmission  Goals to continue to be assessed and determine if pt will have supervision at [place]

## 2022-01-12 NOTE — PCC PHYSICAL THERAPY
Pt 66 yr old female presented to [facility] 1/3/22 s/p multiple falls at home  With extensive testing, pt was found to have a R buttock hematoma, scattered opacities in B lungs (pt with h/o stage 4 lung CA, not on O2- gets chemo every 3 weeks), +toxic metabolic encephalopathy with worsening confusion  +UTI, and MRI revealed +R BG/caudate infarct/ischemia- likely attributed afib/hypercoaguable state due to malignancy, ongoing urinary retention requiring bahena placement, bahena remains at time of eval  Other PMH: afib, HTN, HLD, CHF, GERD, CKD3, DM2, B knee OA  At baseline 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, use of kelley and pt A with all ADLs  Pts cousin currently is staying with brother and plans to stay until end of the month  Overall pt is poor historian, poor recall of recent events, does recall falling several times  Split level home with 0 MARY JANE, 6 steps inside with B HR to living space there is also an elevator for her brother  Pt was I PTA without AD, however reports ongoing functional decline with chemo txs over passed several weeks  Pt with slowed movements and responses, evident lack of insight to deficits/impaired judgement  Unable to recall having bahena and insistent on using BR to void  Pt presents needing min A for gait and transfers with RW w/ repeated cues for hand placement and safety  Pt to greatly benefit from skilled PT intervention to maximize functional strength and mobility to improve independence  Education provided on status, ELOS, goals, and POC, pt and friend at bedside verablize understanding  Pt demonstrates fair rehab potential to reach S goals (pending support at home at time of DC) in ELOS x2 weeks  Anticipate Home PT to be recommended, pt has RW and SPC      1-19-22   Pt continues to make progress with balance and ambulation in stroke rehab but limited due to cognition and L chronic knee pain    Pt is mostly S level for functional mobility needed for safety and cognition  Family will provide   Pt is scheduled for D/C Friday and set up for outpatient therapy to continue neuro rehab

## 2022-01-12 NOTE — CONSULTS
[address] [facility] 1950, 66 y o  female MRN: [id number]  Unit/Bed#: [facility] 449-23 Encounter: [id number]  Primary Care Provider: Ariel Khan DO   Date and time admitted to hospital: 1/11/2022  3:39 PM    Inpatient consult to Internal Medicine  Consult performed by: EDUARDO Ortiz  Consult ordered by: Maxi Bunch MD          Lethargy  Assessment & Plan  Difficulty maintaining conversation on 1/12 - falling asleep multiple times during exam   Admits to snoring - overnight noc ox ordered for 1/12  Obtain CT head wo contrast today  Consider ABG  Frequent neuro-checks  Dyslipidemia  Assessment & Plan  Started on Lipitor 80mg daily in acute setting - continue  Lipid panel on 1/5: Cholesterol 201, Triglycerides 252, HDL 41,   GERD (gastroesophageal reflux disease)  Assessment & Plan  Stable  Continue Protonix 40mg daily as substitute for non-formulary Prilosec 20mg daily  UTI (urinary tract infection)  Assessment & Plan  Received IV Rocephin and oral Keflex to complete 5 day course for >100,000cfu E coli UTI in acute setting  Course completed on 1/9  Bonner in place for urinary retention - consider discontinuing later this week  Monitor for reoccurring symptoms  Urinary retention  Assessment & Plan  Urinary retention in acute setting  Completed 5 day course of abx for E coli UTI  Bonner placed on 1/7  Consider discontinuing later this week  Atrial fibrillation (HCC)  Assessment & Plan  Continue metoprolol tartrate 25mg BID and sotalol 80mg BID for rate control  Continue Eliquis 5mg BID for anticoagulation  Monitor routine VS   Replete electrolytes as needed  Continue to follow-up with Dr Bessy Ayala as outpatient  Depression  Assessment & Plan  Stable  Continue Buspar 7 5mg Q12 and Effexor 37 5mg daily  Supportive counseling as needed  Follows with Palliative as outpatient      Diabetes type 2, controlled ([facility] )  Assessment & Plan  Lab Results   Component Value Date    HGBA1C 5 8 (H) 01/05/2022       Recent Labs     01/11/22  1100 01/11/22  1619 01/11/22 2014 01/12/22  0609   POCGLU 201* 206* 173* 152*       Blood Sugar Average: Last 72 hrs:  (P) 177     A1c 5 8 on 1/5  Home regimen: metformin 1000mg BID and Tradjenta 5mg daily  Currently on SSI  Consider restarting metformin as renal function improves  Continue QID accuchecks and cons  carb diet  Continue to follow-up with PCP as outpatient  Hypertension  Assessment & Plan  Continue home regimen of Losartan 50mg daily and metoprolol tartrate 25mg BID  Started on amlodipine 5mg BID in acute setting - continue at this time  Started on Lasix 40mg daily in acute setting - only taking as needed at home  Consider decreasing dose  Losartan restarted on 1/11 - decrease dose to 25mg daily on 1/12 due to lethargy/possible hypoperfusion  Monitor BP with VS   Continue to follow-up with Dr Beverly Harding as outpatient  Chronic kidney disease, stage 3 [facility])  Assessment & Plan  Lab Results   Component Value Date    EGFR 28 01/12/2022    EGFR 29 01/11/2022    EGFR 32 01/10/2022    CREATININE 1 76 (H) 01/12/2022    CREATININE 1 70 (H) 01/11/2022    CREATININE 1 55 (H) 01/10/2022     Creatinine currently 1 76  Baseline appears to be 1 5-1 8  Avoid nephrotoxins  Encourage adequate hydration  Appears euvolemic  Hold Lasix tomorrow AM   Continue to trend routine BMP  Previously seen by Dr Thu Lacey (Nephrology) as an outpatient  Lung cancer [facility])  Assessment & Plan  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  Currently receiving chemotherapy: Ramucirumab + Docetaxel Q21 days  Will need to restart after discharge from [facility]  Follows with Dr Norma Espana (Heme/Onc) as outpatient  * Acute ischemic stroke [facility])  Assessment & Plan  1/3 - admitted with AMS and multiple falls  CT head on 1/3: No acute intracranial abnormalities    MRI brain on 1/4: R basal ganglia and R caudate acute ischemia  MRI brain with contrast on 1/5: No enhancing lesions  Repeat CT head on 1/6: Evolving R basal ganglia infarct  No hemorrhage or significant mass effect  ECHO on 1/5 - EF 60% with grade 1 relaxation  EEG on 1/6 - Non-specific  Continue Lipitor 80mg daily and ASA 81mg daily  Likely related to small vessel disease  Maintain normotension  Neurovascular checks Q shift  Follow-up with Neurology as outpatient  PT/OT/Speech therapies  Primary team following  History of Present Illness:    Verenice Mosher is a 66 y o  female with a PMH of atrial fibrillation, stage IV lung adenocarcinoma, depression, T2DM, HTN, CKD stage 3, and dyslipidemia who originally presented to [address] [place] [address] on 1/3/2022 after experiencing multiple falls at home and altered mental status  Admitted to hitting her head and denied loss of consciousness; takes Eliquis at home for anticoagulation  CT head negative for acute intracranial abnormalities  CT chest/abd/pelvis showed small post-traumatic hematoma of 2cm in the soft tissues of the R buttocks  XR obtained of L knee which showed osteoarthritis and joint effusion  UA obtained which showed bacteria and leukocytes - patient was started on cipro and switched to IV Rocephin for possible UTI  Urine culture positive for >100,000cfu E  coli and patient was transitioned to Keflex to complete a 5 day course  Hx of c-diff colonization and had complaints of diarrhea prior to admission; repeat C-diff was unable to be collected due to not diarrhea while inpatient  Neurology was consulted for altered mental status and recommended MRI of the brain which showed R basal ganglia and R caudate acute ischemia  Neurology then requested a repeat MRI with contrast to rule out metastasis, which showed that there were no enhancing lesions  After discussion with Oncologist; patient was continued on Eliquis 5mg BID and started on ASA 81mg daily    ECHO was WNL and per Neurology stroke likely related to small vessel disease  EEG was also performed which was non-specific  During admission, patient experienced urinary retention and bahena catheter was placed on 1/7 with plans for voiding trial in 1 week  Norvasc was also increased to 5mg BID during admission due to uncontrolled hypertension  Neurological status has started to improve and patient was evaluated by PT and OT and found to be a candidate for acute rehab  Patient admitted to [address] [place] [address] on 1/11/2022; we are consulted for medical clearance  Pt examined while pt lying in bed in pt room  Pt appears very lethargic during exam, and needs to be stimulated multiple times to hold conversation  States that she is normally very tired/sleepy  Slept well last night  Admits to hx of snoring but has never been tested for sleep apnea  Denies any oxygen use at this time but has used it years ago  Discussed with OT who had worked with pt this morning and denies any lethargy during session  VSS  BG WNL  Labs this morning stable  Will obtain CT head to rule out any further ischemia/bleed  Ordered overnight noc-ox for tonight  Denies any neurological changes since stroke  Denies any headaches, visual changes, lightheadedness, dizziness, or balance disturbance  Denies any SOB, palpitations, or chest pain  Had a BM this morning  Bahena in place, draining clear, yellow urine  Lives at home with her brother who she is the primary caretaker of  Currently receiving help from her cousin  Review of Systems:    Review of Systems   Constitutional: Positive for fatigue (keeps falling asleep during conversation, states this is normal for her)  Negative for appetite change, chills and fever  Eyes: Negative for visual disturbance  Respiratory: Negative for cough and shortness of breath  Cardiovascular: Negative for chest pain, palpitations and leg swelling     Gastrointestinal: Negative for abdominal distention, abdominal pain, constipation, diarrhea, nausea and vomiting  LBM 1/12   Genitourinary: Positive for difficulty urinating (Bahena placed for urinary retention - denies hx of urinary symptoms prior to bahena insertion)  Negative for pelvic pain  Musculoskeletal: Negative for arthralgias, back pain and gait problem  Neurological: Negative for dizziness, seizures, facial asymmetry, speech difficulty, weakness, light-headedness, numbness and headaches  Psychiatric/Behavioral: Negative for sleep disturbance  Appears lethargic - states that she has baseline daytime sleepiness  Admits to snoring - never tested for sleep apnea  Past Medical and Surgical History:     History reviewed  No pertinent past medical history  History reviewed  No pertinent surgical history  Meds/Allergies:    all medications and allergies reviewed    Allergies: Allergies   Allergen Reactions    Amoxicillin Hives       Social History:     Marital Status: Single    Substance Use History:   Social History     Substance and Sexual Activity   Alcohol Use Not Currently     Social History     Tobacco Use   Smoking Status Never Smoker   Smokeless Tobacco Never Used     Social History     Substance and Sexual Activity   Drug Use Never       Family History:  Reviewed    Physical Exam:     Vitals:   Blood Pressure: 112/53 (01/12/22 1100)  Pulse: 56 (01/12/22 1100)  Temperature: (!) 97 2 °F (36 2 °C) (01/12/22 1100)  Temp Source: Tympanic (01/12/22 1100)  Respirations: 18 (01/12/22 1100)  Height: 5' 4" (162 6 cm) (01/11/22 1530)  Weight - Scale: 74 9 kg (165 lb 2 oz) (01/11/22 1530)  SpO2: 100 % (01/12/22 0559)    Physical Exam  Vitals and nursing note reviewed  Constitutional:       Appearance: Normal appearance  HENT:      Head: Normocephalic and atraumatic  Cardiovascular:      Rate and Rhythm: Normal rate and regular rhythm  Pulses: Normal pulses  Heart sounds: Normal heart sounds   No murmur heard   No friction rub  Pulmonary:      Effort: Pulmonary effort is normal  No respiratory distress  Breath sounds: Normal breath sounds  No wheezing or rhonchi  Abdominal:      General: Abdomen is flat  Bowel sounds are normal  There is no distension  Palpations: Abdomen is soft  Tenderness: There is no abdominal tenderness  Genitourinary:     Comments: Bonner draining clear, yellow urine  Musculoskeletal:      Cervical back: Normal range of motion and neck supple  Right lower leg: Edema (Trace non-pitting edema) present  Left lower leg: Edema (Trace non-pitting edema) present  Skin:     General: Skin is warm and dry  Capillary Refill: Capillary refill takes less than 2 seconds  Neurological:      Mental Status: She is oriented to person, place, and time  She is lethargic  Cranial Nerves: No dysarthria or facial asymmetry  Comments: Difficulty maintaining conversation - continues to fall asleep during conversation  Easily re-stimulated  Psychiatric:         Mood and Affect: Mood normal          Behavior: Behavior normal            Additional Data:     Labs and imaging reviewed  EKG Reviewed - last EKG on 1/3 showed sinus bradycardia at 58 BPM with QTc of 460  M*Modal software was used to dictate this note  It may contain errors with dictating incorrect words or incorrect spelling  Please contact the provider directly with any questions

## 2022-01-12 NOTE — ASSESSMENT & PLAN NOTE
Managed here on Lopressor 25mg Q12h, Losartan 25mg daily  Lasix on HOLD  Amlodipine discontinued due to soft BP     BP stable   Follows with Dr Yuri Vergara

## 2022-01-12 NOTE — ASSESSMENT & PLAN NOTE
Difficulty maintaining conversation on 1/12 - falling asleep multiple times during exam   Admits to snoring - overnight noc ox ordered for 1/12  Obtain CT head wo contrast today  Consider ABG  Frequent neuro-checks

## 2022-01-12 NOTE — CASE MANAGEMENT
Cm called pt's dtr and left a voicemail to introduce cm role and rehab routine  Per pt's chart, she lives in a single family home with her brother  HSe was independent prior to admission  She can live on 1 level and has no stairs to enter  Pt's cousin is currently staying with her brother whom pt cares for but this is temporary team reports  Pt's dtr is looking into 234/7 HA upon discharge  PT has grab bars, a shower chair, roller walker and a mechanical lift  She uses [facility] in [place], [place]  Following to assist w/ d/c planning needs

## 2022-01-12 NOTE — ASSESSMENT & PLAN NOTE
Lab Results   Component Value Date    EGFR 28 01/12/2022    EGFR 29 01/11/2022    EGFR 32 01/10/2022    CREATININE 1 76 (H) 01/12/2022    CREATININE 1 70 (H) 01/11/2022    CREATININE 1 55 (H) 01/10/2022     Creatinine currently 1 76  Baseline appears to be 1 5-1 8  Avoid nephrotoxins  Encourage adequate hydration  Appears euvolemic  Hold Lasix tomorrow AM   Continue to trend routine BMP  Previously seen by Dr Joe Taylor (Nephrology) as an outpatient

## 2022-01-12 NOTE — ASSESSMENT & PLAN NOTE
Resolved  Diagnosed in acute care  Urine culture + E  Coli   Treated with 5 days antibiotics Ceftriaxone and Keflex  Also was on oral vancomycin to prevent recurrent C   Diff  Asymptomatic

## 2022-01-12 NOTE — PROGRESS NOTES
01/12/22 0700   Rehab Team Goals   ADL Team Goal Patient will require supervision with ADLs with least restrictive device upon completion of rehab program   Rehab Team Interventions   OT Interventions Self Care; Therapeutic Exercise; Energy Conservation;Patient/Family Education;Group Therapy   Eating Goal   Eating Goal 06  Independent - Patient completes the activity by him/herself with no assistance from a helper  Meal Complete All meals   Diet Level   (per MD/SLP)   Status Ongoing; Target goal - two weeks   Interventions Optimal Position   Grooming Goal   Oral Hygiene Goal 04  Supervision or touching assistance- [place]arton provides VERBAL CUES or supervision throughout activity  Task Wash/Dry Face;Wash/Dry Hands;Brush Teeth; Complete Groom   Environment Stand at [employer] Precaution   Status Ongoing; Target goal - two weeks   Intervention Balance Work; Therapeutic Exercise; Tolerance Work   Tub/Shower Transfer Goal   Method Shower Stall  (supervision)   Assist Device Seat with [employer]   Status Ongoing; Target goal - two weeks   Interventions ADL Training;Assistive Device; Neuromuscular Education   Bathing Goal   Shower/bathe self Goal 04  Supervision or touching assistance- [place] provides VERBAL CUES or supervision throughout activity  Environment Seated;Standing; Shower   Adaptive Equipment Seat with back;Grab Bar   Safety Precautions Safety Precaution   Status Ongoing; Target goal - two weeks   Upper Body Dressing Goal   Upper body dressing Goal 04  Supervision or touching assistance- [place] provides VERBAL CUES or supervision throughout activity  Task Upper Body;Arms in/out; Over Head   Environment Seated   Safety Precautions Safety Precaution   Status Ongoing; Target goal - two weeks   Intervention Tolerance Work; Therapeutic Exercise;Balance Work   Lower Body Dressing Goal   Lower body dressing Goal 04   Supervision or touching assistance- [place]arton provides VERBAL CUES or supervision throughout activity   (to don bahena if still +, dec cog/problem solving)   Putting on/taking off footwear Goal 06  Independent - Patient completes the activity by him/herself with no assistance from a helper  Task Lower Body;Shoe/Slipper;Socks;Pants; Undergarment   Environment Seated;Standing   Safety Precautions Safety Precaution   Status Ongoing; Target goal - two weeks   Intervention Balance Work; Therapeutic Exercise; Tolerance Work   Toileting Transfer Goal   Toilet transfer Goal 04  Supervision or touching assistance- [place] provides VERBAL CUES or supervision throughout activity  Assistive Device Grab Darroll Aki; Bedside Commode   Safety Safety Precaution   Status Ongoing; Target goal - two weeks   Intervention ADL Training;Assistive Device;Balance Work   Toileting Goal   Toileting hygiene Goal 04  Supervision or touching assistance- [place] provides VERBAL CUES or supervision throughout activity  Task Pants Up;Pants Down;Hygiene   Safety Balance   Status Ongoing; Target goal - two weeks   Intervention ADL Training;Balance Work;Assistive Device   Comprehension Goal   Comprehension Assist Level Supervision   Assist Device Glasses   Function Demand Basic Needs   Status Ongoing; Target goal - two weeks   Interventions Use of Gesture   Expression Goal   Expression Assist Level Supervision   Assist Device Gestures yes; Gestures no   Function Demand Basic Needs   Status Ongoing; Target goal - two weeks   Intervention Writing Tasks   Meal Prep and Kitchen Mobility   Assist Level Supervision   Status Ongoing; Target goal - two weeks   Medication Management   Assist Level Supervision   Status Ongoing; Target goal - two weeks

## 2022-01-12 NOTE — ASSESSMENT & PLAN NOTE
Received IV Rocephin and oral Keflex to complete 5 day course for >100,000cfu E coli UTI in acute setting  Course completed on 1/9  Bonner in place for urinary retention - consider discontinuing later this week  Monitor for reoccurring symptoms

## 2022-01-12 NOTE — ASSESSMENT & PLAN NOTE
Started on Lipitor 80mg daily in acute setting - continue  Lipid panel on 1/5: Cholesterol 201, Triglycerides 252, HDL 41,

## 2022-01-12 NOTE — PROGRESS NOTES
PM&R PROGRESS NOTE:  Christie Hightower 66 y o  female MRN: [id number]  Unit/Bed#: -01 Encounter: [id number]        Rehab Diagnosis: Impairment of mobility, safety, Activities of Daily Living (ADLs), and cognitive/communication skills due to Stroke:  01 1  Left Body Involvement (Right Brain)  Right basal ganglia CVA     History of Present Illness:   Christie Hightower is a 66 y o  female with known stage 4 juan cancer undergoing chemotherapy every 3 weeks, A fib on Eliquis, HTN, HLD, CHF (grade 2 DD), GERD, CKD3, DM2, bilateral knee osteoarthritis, depression/anxiety who presented to the [facility] [facility] on 1/3/22 due to 4 falls and leg contusion  Patient found to have a right buttock hematoma which remained stable  Patient developed altered mental status  CTH negative  MRI brain ultimately revealed a acute right basal ganglia ischemic CVA  MRA/MRV negative for occlusive disease  MRI brain with contrast negative for any enhancing lesions  Patient seen by Neurology and determined etiology of CVA small vessel disease  Aspirin was added to her secondary CVA ppx  Medically, treated for UTI with Ceftriaxone and Keflex and has completed treatment  Also required a Bonner placement on 1/8/22 for urinary retention,  Patient had diarrhea transiently which improved  Patient's BP medications were adjusted to allow for perimissive HTN post CVA  After medical stabilization, patient was found to have acute functional deficits from her baseline in mobility, self care, and cognition and was admitted to [facility] for acute inpatient rehabilitation  SUBJECTIVE: Patient seen face to face  No acute issues overnight  Participating in OT this morning       ASSESSMENT: Stable, progressing      PLAN:    Rehabilitation  · Functional deficits:  bilateral proximal musculature weakness, impaired proprioception/sensation in feet, mild left dysmetria, impaired balance, impaired mobility, self care, impaired cognition   Continue current rehabilitation plan of care to maximize function   Functional update:   rambo mackey in progress   Estimated Discharge: TBD, ELOS 2-3      Pain   No major complaints   Tylenol available if needed    DVT prophylaxis   Managed on Eliquis     Bladder plan   +Bonner catheter in place - TOV in 1-2 days     Bowel plan   Continent   Was having loose stools - she states this is chronic post chemotherapy       * Acute ischemic stroke (HCC)  Assessment & Plan  Presented with 4 falls and mental status change  MRI brain confirmed a right basal ganglia and right caudate ischemic CVA  Etiology: Small vessel disease  Secondary CVA ppx: managed on Eliquis 5mg BID, Lipitor 80mg daily, and now aspirin 81mg daily  Continue CVA education while at [address] [place] [address] education of modifiable risk factors  Monitor mood  Follow-up with Neurology as outpatient     Neuropathy due to chemotherapeutic drug [facility])  Assessment & Plan  Not painful  Consider topical lidoderm patch or gabapentin if pain develops    Chemotherapy-induced thrombocytopenia  Assessment & Plan  Plts = 167K  Continue to monitor    GERD (gastroesophageal reflux disease)  Assessment & Plan  Managed on Protonix (therapeutic exchange for Prilosec)    Diastolic congestive heart failure (HCC)  Assessment & Plan  Seen on previous echocardiogram  Continue Lasix 40mg daily (was taking up to 40 mg BID)  Patient currently euvolemic   IM consultants following and managing   Follows with Dr Beverly Harding    UTI (urinary tract infection)  Assessment & Plan  Diagnosed in acute care  Urine culture + E  Coli   Treated with 5 days antibiotics Ceftriaxone and Keflex  Also was on oral vancomycin to prevent recurrent C   Diff  Asymptomatic   Currently with Bonner catheter for urinary retention - TOV during [facility]    Urinary retention  Assessment & Plan  Likely related to UTI  Bonner catheter inserted 1/8/22  TOV during AR[facility]    Ground glass opacity present on imaging of lung  Assessment & Plan  Found incidentally on CT Chest  COVID - 19 negative   PCP to follow as outpatient     Atrial fibrillation [facility])  Assessment & Plan  Rate/rhythm controlled on Lopressor 25mg Q12hrs and Sotolol 80mg BID  Anticoagulated on Eliquis 5mg BID  Stable  IM consultants following while at [place] [address] with Dr Komal Paul on home regimen of Buspar 7 5mg BID and Effexor XR 37 5 QHS  Mood is stable  Consult neuropsychology if needed    Diabetes type 2, controlled [facility])  Assessment & Plan  Lab Results   Component Value Date    HGBA1C 5 8 (H) 01/05/2022     Managed here on SSI  At home was on Metformin and Tradjenta  Continue CCD  Nutrition consulted   IM consultants following and managing     Hypertension  Assessment & Plan  Managed here on Norvasc 5mg BID, Lopressor 25mg Q12h, Losartan 50mg daily, and Lasix 40mg daily  At home on same regimen as above  BP stable   Follows with Dr Heidy Mckinnon [facility])  Assessment & Plan  Known stage 4 adenocarcinoma of lung   · Follows with Dr Coco Knight  · [facility] Chemotherapy every 3 weeks  Last infusion was 12/27/21  Missed her 1/7/22 infusion due to hospitalization  · Follow-up with Dr Coco Knight as outpatient   CT Chest: Scattered groundglass opacities in both lungs which are new from prior exam   Consider infectious or inflammatory etiologies including Covid viral pneumonia  Previous right lower lobe lung mass has diminished since July  Previous left upper lobe tumor has also decreased in size but appears more atelectatic  Increasing water attenuation pleural fluid, partially loculated pleural fluid in the left lower lung  Follow-up per cancer imaging protocol  Contusion of left lower leg  Assessment & Plan  Sustained during fall  X-ray of right ankle is negative for fracture, however showing linear calcifications associated with the plantar fascia and Achilles tendon, these are chronic      Buttocks Hematoma  Assessment & Plan  Sustained during fall  CT showing Suspected small post traumatic hematoma measuring 2 cm in the soft tissues of the right buttock/perianal region  No active bleeding  Stable H&H  No overt pain complaints           Appreciate IM consultants medical co-management  Labs, medications, and imaging personally reviewed  ROS:  A ten point review of systems was completed on 01/12/22 and pertinent positives are listed in subjective section  All other systems reviewed were negative  OBJECTIVE:   /70   Pulse 70   Temp 97 6 °F (36 4 °C) (Tympanic)   Resp 18   Ht 5' 4" (1 626 m)   Wt 74 9 kg (165 lb 2 oz)   SpO2 100%   BMI 28 34 kg/m²     Physical Exam  Vitals and nursing note reviewed  Constitutional:       General: She is not in acute distress  HENT:      Head: Normocephalic and atraumatic  Nose: Nose normal       Mouth/Throat:      Mouth: Mucous membranes are moist    Eyes:      Conjunctiva/sclera: Conjunctivae normal    Cardiovascular:      Rate and Rhythm: Normal rate and regular rhythm  Pulses: Normal pulses  Pulmonary:      Effort: Pulmonary effort is normal       Breath sounds: Normal breath sounds  No wheezing or rales  Abdominal:      General: Bowel sounds are normal  There is no distension  Palpations: Abdomen is soft  Tenderness: There is no abdominal tenderness  Musculoskeletal:         General: No swelling  Cervical back: Neck supple  Skin:     General: Skin is warm  Neurological:      Mental Status: She is alert and oriented to person, place, and time  Motor: Weakness (Mild left leg weakness) present     Psychiatric:         Mood and Affect: Mood normal           Lab Results   Component Value Date    WBC 9 70 01/12/2022    HGB 11 6 (L) 01/12/2022    HCT 35 4 (L) 01/12/2022    MCV 87 01/12/2022     01/12/2022     Lab Results   Component Value Date    SODIUM 135 (L) 01/12/2022    K 3 8 01/12/2022     01/12/2022 CO2 31 (H) 01/12/2022    BUN 35 (H) 01/12/2022    CREATININE 1 76 (H) 01/12/2022    GLUC 164 (H) 01/12/2022    CALCIUM 8 9 01/12/2022     Lab Results   Component Value Date    INR 1 13 01/03/2022    PROTIME 14 5 01/03/2022           Current Facility-Administered Medications:     acetaminophen (TYLENOL) tablet 650 mg, 650 mg, Oral, Q6H PRN, Arsh Henriquez MD    amLODIPine (NORVASC) tablet 5 mg, 5 mg, Oral, BID, Arsh Henriquez MD, 5 mg at 01/12/22 0839    apixaban (ELIQUIS) tablet 5 mg, 5 mg, Oral, BID, Arsh Henriquez MD, 5 mg at 01/12/22 0840    aspirin (ECOTRIN LOW STRENGTH) EC tablet 81 mg, 81 mg, Oral, Daily, Arhs Henriquez MD, 81 mg at 01/12/22 0838    atorvastatin (LIPITOR) tablet 80 mg, 80 mg, Oral, Daily With Natasha Benedict MD, 80 mg at 01/11/22 1722    bisacodyl (DULCOLAX) rectal suppository 10 mg, 10 mg, Rectal, Daily PRN, Arsh Henriquez MD    busPIRone (BUSPAR) tablet 7 5 mg, 7 5 mg, Oral, BID, Arsh Henriquez MD, 7 5 mg at 01/12/22 7476    cholecalciferol (VITAMIN D3) tablet 2,000 Units, 2,000 Units, Oral, Daily, Arsh Henriquez MD, 2,000 Units at 01/12/22 0839    cyanocobalamin (VITAMIN B-12) tablet 500 mcg, 500 mcg, Oral, Daily, Arsh Henriquez MD, 500 mcg at 01/12/22 1901    docusate sodium (COLACE) capsule 100 mg, 100 mg, Oral, BID PRN, Arsh Henriquez MD    folic acid (FOLVITE) tablet 1 mg, 1 mg, Oral, Daily, Arsh Henriquez MD, 1 mg at 01/12/22 0839    furosemide (LASIX) tablet 40 mg, 40 mg, Oral, Daily, Arsh Henriquez MD, 40 mg at 01/12/22 0839    insulin lispro (HumaLOG) 100 units/mL subcutaneous injection 1-6 Units, 1-6 Units, Subcutaneous, TID AC, 1 Units at 01/12/22 0840 **AND** Fingerstick Glucose (POCT), , , TID AC, Arsh Henriquez MD    loratadine (CLARITIN) tablet 10 mg, 10 mg, Oral, Daily, Arsh Henriquez MD, 10 mg at 01/12/22 0838    losartan (COZAAR) tablet 50 mg, 50 mg, Oral, Daily, Arsh Henriquez MD, 50 mg at 01/12/22 0839    melatonin tablet 6 mg, 6 mg, Oral, HS, Светлана Marie MD, 6 mg at 01/11/22 2136    metoprolol tartrate (LOPRESSOR) tablet 25 mg, 25 mg, Oral, Q12H [address], Светлана Marie MD, 25 mg at 01/12/22 0838    ondansetron (ZOFRAN-ODT) dispersible tablet 4 mg, 4 mg, Oral, Q6H PRN, Светлана Marie MD    pantoprazole (PROTONIX) EC tablet 40 mg, 40 mg, Oral, Early Morning, Светлана Marie MD, 40 mg at 01/12/22 0604    polyethylene glycol (MIRALAX) packet 17 g, 17 g, Oral, Daily PRN, Светлана Marie MD    sotalol (BETAPACE) tablet 80 mg, 80 mg, Oral, BID, Светлана Marie MD, 80 mg at 01/12/22 0841    venlafaxine (EFFEXOR-XR) 24 hr capsule 37 5 mg, 37 5 mg, Oral, HS, Светлана Marie MD, 37 5 mg at 01/11/22 2136    History reviewed  No pertinent past medical history  Patient Active Problem List    Diagnosis Date Noted    Acute ischemic stroke ([facility] ) 01/05/2022    Dyslipidemia 01/12/2022    UTI (urinary tract infection) 59/83/1598    Diastolic congestive heart failure ([facility] ) 01/11/2022    GERD (gastroesophageal reflux disease) 01/11/2022    Chemotherapy-induced thrombocytopenia 01/11/2022    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[facility] ) 01/11/2022    Encephalopathy 01/06/2022    Urinary retention 01/06/2022    Confusion     Hypomagnesemia 01/04/2022    Ground glass opacity present on imaging of lung 01/04/2022    Buttocks Hematoma 01/03/2022    Contusion of left lower leg 01/03/2022    Lung cancer ([facility] ) 01/03/2022    Chronic kidney disease, stage 3 ([facility] ) 01/03/2022    Hypertension 01/03/2022    Diabetes type 2, controlled ([facility] ) 01/03/2022    Depression 01/03/2022    Hypokalemia 01/03/2022    Atrial fibrillation ([facility] ) 01/03/2022          Светлана Marie MD    Total time spent:  30 minutes with more than 50% spent counseling/coordinating care  Counseling includes discussion with patient re: progress and discussion with patient of his/her current medical/functional state/information   Coordination of patient's care was performed in conjunction with consulting services  Time invested included review of patient's labs, vitals, and management of their comorbidities with continued monitoring  The care of the patient was extensively discussed and appropriate treatment plan was formulated unique for this patient  ** Please Note:  voice to text software may have been used in the creation of this document   Although proof errors in transcription or interpretation are a potential of such software**

## 2022-01-12 NOTE — ASSESSMENT & PLAN NOTE
Known stage 4 adenocarcinoma of lung   · Follows with Dr Tato Gonzalez  · [facility] Chemotherapy every 3 weeks  Last infusion was 12/27/21  Missed her 1/7/22 infusion due to hospitalization  · Follow-up with Dr Tato Gonzalez as outpatient   CT Chest: Scattered groundglass opacities in both lungs which are new from prior exam   Consider infectious or inflammatory etiologies including Covid viral pneumonia  Previous right lower lobe lung mass has diminished since July  Previous left upper lobe tumor has also decreased in size but appears more atelectatic  Increasing water attenuation pleural fluid, partially loculated pleural fluid in the left lower lung  Follow-up per cancer imaging protocol

## 2022-01-12 NOTE — PROGRESS NOTES
[facility] ICP  PT LTGs  ELOS x2 weeks  01/12/22 1230   Rehab Team Goals   Transfer Team Goal Patient will require supervision with transfers with least restrictive device upon completion of rehab program   Locomotion Team Goal Patient will require supervision with locomotion with least restrictive device upon completion of rehab program   Rehab Team Interventions   PT Interventions Gait Training; Therapeutic Exercise;Neuromuscualr Reeducation;Transfer Training;Community Reintegration;Bed Mobility;Modalities; Patient/Family Education   PT Transfer Goal   Roll left and right Goal 06  Independent - Patient completes the activity by him/herself with no assistance from a helper  Sit to lying Goal 06  Independent - Patient completes the activity by him/herself with no assistance from a helper  Lying to sitting on side of bed Goal 06  Independent - Patient completes the activity by him/herself with no assistance from a helper  Sit to stand Goal 04  Supervision or touching assistance- Walker provides VERBAL CUES or supervision throughout activity  Chair/bed-to-chair transfer Goal 04  Supervision or touching assistance- Walker provides VERBAL CUES or supervision throughout activity  Car Transfer Goal 04  Supervision or touching assistance- Walker provides VERBAL CUES or supervision throughout activity  Assistive Device Roller Walker   Safety Precautions Hand Placement   Environment Level Surface; Well Lit; Distractions   Status Ongoing; Target goal - two weeks   Locomotion Goal   Primary discharge mode of locomotion Walking   Target Walk Distance 250 ft   Assist Device Roller Walker   Gait Pattern Improvement Ataxic; Inconsistant Sushma; Excess Slow; Forward Flexion; Antalgic;Decreased foot clearance   Environment Level Surface; Well Lit; Distractions   Walk 10 feet Goal 04  Supervision or touching assistance- Walker provides VERBAL CUES or supervision throughout activity  Walk 50 feet with 2 turns Goal 04   Supervision or touching assistance- [place] provides VERBAL CUES or supervision throughout activity  Walk 150 feet Goal 04  Supervision or touching assistance- [place] provides VERBAL CUES or supervision throughout activity  Walking 10 feet on uneven surface 04  Supervision or touching assistance- Reading provides VERBAL CUES or supervision throughout activity  Walking Goal Status Ongoing; Target goal - two weeks   Wheel 50 feet with 2 turns Goal 09  Not applicable   Wheel 396 feet Goal 09  Not applicable   Stairs Goal   1 step or curb goal 04  Supervision or touching assistance- [place] provides VERBAL CUES or supervision throughout activity  4 steps Goal 04  Supervision or touching assistance- [place] provides VERBAL CUES or supervision throughout activity  12 steps Goal 09  Not applicable   Assist Level Supervision   Number of Stairs 6   Technique Non-reciprocal   Hand Rail Bilateral   Status Ongoing; Target goal - two weeks   Object Retrieval Goal   Picking up object Goal 04  Supervision or touching assistance- [place] provides VERBAL CUES or supervision throughout activity  Assistive Device  Reacher   Small Object Picked Up marker from floor - target goal - ELOS x2 weeks

## 2022-01-12 NOTE — PROGRESS NOTES
ARC PT EVAL/TAA       01/12/22 1230   Patient Data   Rehab Impairment  Impairment of mobility, safety, Activities of Daily Living (ADLs), and cognitive/communication skills due to Stroke:  01 4  No paresis   Etiologic Diagnosis R basal ganglia infart   Date of Onset 01/03/22   Home Setup   Type of Home Split Level   Method of Entry   (None)   Number of Stairs 0   Number of Stairs in Home 6  (elevator)   In Home Hand Rail Bilateral   First Floor Setup Available Yes   Home Modification Comment pending progress   Available Equipment Roller Walker;Single Point Cane   Baseline Information   Transportation    Prior Level of Function   Self-Care 3  Independent - Patient completed the activities by him/herself, with or without an assistive device, with no assistance from a helper  Indoor-Mobility (Ambulation) 3  Independent - Patient completed the activities by him/herself, with or without an assistive device, with no assistance from a helper  Stairs 3  Independent - Patient completed the activities by him/herself, with or without an assistive device, with no assistance from a helper  Functional Cognition 3  Independent - Patient completed the activities by him/herself, with or without an assistive device, with no assistance from a helper  Falls in the Last Year   Number of falls in the past 12 months 6   Type of Injury Associated with Fall Injury   Patient Preference   Nickname (Patient Preference) Kristine Edwards   Psychosocial   Psychosocial (WDL) WDL   Patient Behaviors/Mood Calm; Cooperative   Restrictions/Precautions   Precautions Fall Risk; Bonner; Bed/chair alarms;Cognitive;Supervision on toilet/commode   Pain Assessment   Pain Assessment Tool 0-10   Pain Score No Pain   Toilet Transfer   Surface Assessed Standard Commode   Limitations Noted In Problem Solving;UE Strength;LE Strength   Adaptive Equipment Grab Bar   Type of Assistance Needed Physical assistance   Physical Assistance Level 26%-50%   Toilet Transfer CARE Score 3   Toileting   Able to Pull Clothing down yes, up yes  Manage Hygiene Bowel   Adaptive Equipment Grab Bar   Findings pt with +bahena however c/o urgency and insistent on going into BR to void  +BM, pt confused about bahena depsite ongoing education and reassurance  Transfer Bed/Chair/Wheelchair   Positioning Concerns Skin Integrity; Hemiplegia;Cognition   Limitations Noted In Balance; Endurance;UE Strength;LE Strength   Adaptive Equipment Roller Walker   Findings cues for technique  Stand pivot without AD and use of bed rail  Type of Assistance Needed Physical assistance;Verbal cues   Physical Assistance Level 26%-50%   Chair/Bed-to-Chair Transfer CARE Score 3   Roll Left and Right   Type of Assistance Needed Physical assistance; Adaptive equipment   Physical Assistance Level 25% or less   Comment use of bed rail, more challenge rolling to L vs R  Roll Left and Right CARE Score 3   Sit to Lying   Type of Assistance Needed Incidental touching   Physical Assistance Level No physical assistance   Sit to Lying CARE Score 4   Lying to Sitting on Side of Bed   Type of Assistance Needed Physical assistance   Physical Assistance Level 26%-50%   Comment requests A from R gregg to sit, use of bed rail  Lying to Sitting on Side of Bed CARE Score 3   Sit to Stand   Type of Assistance Needed Physical assistance;Verbal cues   Physical Assistance Level 26%-50%   Comment min A with repeated cues and education for hand placement with RW use  Sit to Stand CARE Score 3   Picking Up Object   Type of Assistance Needed Physical assistance   Physical Assistance Level 25% or less   Comment use of reacher with RW support to pickup marker from floor, pt reports using reacher mostly at baseline for safety      Picking Up Object CARE Score 3   Car Transfer   Reason if not Attempted Environmental limitations   Car Transfer CARE Score 10   Ambulation   Primary Mode of Locomotion Prior to Admission Walk Distance Walked (feet) 90 ft  (2x35)   Assist Device Roller Walker   Gait Pattern Ataxic; Antalgic; Inconsistant Sushma; Slow Sushma;Decreased foot clearance; Forward Flexion; Step through   Limitations Noted In Endurance; Sequencing;Speed;Strength;Posture   Provided Assistance with: Balance   Walk Assist Level Minimum Assist   Findings evdient L LE weakness t/o gait cycle, able to grasp RW with L hand  improved distance with 3rd trial, WC pulled behind for safety  Walk 10 Feet   Type of Assistance Needed Physical assistance   Physical Assistance Level 26%-50%   Comment min A with Rw  Walk 10 Feet CARE Score 3   Walk 50 Feet with Two Turns   Type of Assistance Needed Physical assistance;Verbal cues   Physical Assistance Level 26%-50%   Comment min A with RW, cues for UE support and inc L step length  Walk 50 Feet with Two Turns CARE Score 3   Walk 150 Feet   Reason if not Attempted Environmental limitations   Walk 150 Feet CARE Score 10   Walking 10 Feet on Uneven Surfaces   Comment TBA    Reason if not Attempted Safety concerns   Walking 10 Feet on Uneven Surfaces CARE Score 88   Wheelchair mobility   Findings WC NA- anticipate DC home ambulatory  Wheel 50 Feet with Two Turns   Reason if not Attempted Activity not applicable   Wheel 50 Feet with Two Turns CARE Score 9   Wheel 150 Feet   Reason if not Attempted Activity not applicable   Wheel 384 Feet CARE Score 9   Curb or Single Stair   Style negotiated Single stair   Type of Assistance Needed Physical assistance   Physical Assistance Level 25% or less   Comment B HR- bottom step   1 Step (Curb) CARE Score 3   4 Steps   Type of Assistance Needed Physical assistance   Physical Assistance Level 26%-50%   Comment BHR- cues for nonrecip sequence due to B knee OA and L LE weakness      4 Steps CARE Score 3   12 Steps   Reason if not Attempted Safety concerns   12 Steps CARE Score 88   Stairs   Type  Steps   # of Steps 4   Weight Bearing Precautions Fall Risk   Assist Devices Bilateral Rail   Memory   Short-Term Impaired   Recalls Precaution No   RLE Assessment   RLE Assessment WFL   Strength RLE   RLE Overall Strength 4/5   Strength LLE   LLE Overall Strength 3/5  (grossly 3/5, R ankle WNL  )   Sensation   Light Touch No apparent deficits  (B LE)   Cognition   Overall Cognitive Status Impaired  (generally responds appropriately  )   Arousal/Participation Alert; Cooperative  (per CRNP- lethargic this AM  )   Attention Within functional limits   Orientation Level Oriented X4   Memory Decreased recall of recent events   Following Commands Follows one step commands with increased time or repetition   Comments generally flat affect, poor engagement in conversation, redirects questioning if she does not know answer, To have ST eval this PM     Vision   Vision Comments wears glasses as needed  -reports visual decline over past few months  Perception   Inattention/Neglect Appears intact   Therapeutic Exercise   Therapeutic Exercise/Activity Time spent for toileting, prolonged time needed for processing cues, and intitiating tasks  Was lethargic for CRNP this AM, not so lethargic this session, just slowed  Feels its the longest day in history  Discharge Information   Vocational Plan Retired/not working   Patient's Discharge Plan to go home with family support  Patient's Rehab Expectations "to do for myself and care for my brother again"   Barriers to Discharge Home Limited Family Support;Decreased Cognitive Function;Decreased Strength;Decreased Endurance; Safety Considerations   Impressions Pt 66 yr old female presented to [address] [place] [address] 1/3/22 s/p multiple falls at home  With extensive testing, pt was found to have a R buttock hematoma, scattered opacities in B lungs (pt with h/o stage 4 lung CA, not on O2- gets chemo every 3 weeks), +toxic metabolic encephalopathy with worsening confusion   +UTI, and MRI revealed +R BG/caudate infarct/ischemia- likely attributed afib/hypercoaguable state due to malignancy, ongoing urinary retention requiring bahena placement, bahena remains at time of eval  Other PMH: afib, HTN, HLD, CHF, GERD, CKD3, DM2, B knee OA  At baseline 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, use of kelley and pt A with all ADLs  Pts cousin currently is staying with brother and plans to stay until end of the month  Overall pt is poor historian, poor recall of recent events, does recall falling several times  Split level home with 0 MARY JANE, 6 steps inside with B HR to living space there is also an elevator for her brother  Pt was I PTA without AD, however reports ongoing functional decline with chemo txs over passed several weeks  Pt with slowed movements and responses, evident lack of insight to deficits/impaired judgement  Unable to recall having bahena and insistent on using BR to void  Pt presents needing min A for gait and transfers with RW w/ repeated cues for hand placement and safety  Pt to greatly benefit from skilled PT intervention to maximize functional strength and mobility to improve independence  Education provided on status, ELOS, goals, and POC, pt and friend at bedside verablize understanding  Pt demonstrates fair rehab potential to reach S goals (pending support at home at time of DC) in ELOS x2 weeks  Anticipate Home PT to be recommended for followup at [facility], pt has RW and SPC      PT Therapy Minutes   PT Time In 1230   PT Time Out 1400   PT Total Time (minutes) 90   PT Mode of treatment - Individual (minutes) 90   PT Mode of treatment - Concurrent (minutes) 0   PT Mode of treatment - Group (minutes) 0   PT Mode of treatment - Co-treat (minutes) 0   PT Mode of Treatment - Total time(minutes) 90 minutes   PT Cumulative Minutes 90   Cumulative Minutes   Cumulative therapy minutes 180

## 2022-01-12 NOTE — PLAN OF CARE
Problem: PAIN - ADULT  Goal: Verbalizes/displays adequate comfort level or baseline comfort level  Description: Interventions:  - Encourage patient to monitor pain and request assistance  - Assess pain using appropriate pain scale  - Administer analgesics based on type and severity of pain and evaluate response  - Implement non-pharmacological measures as appropriate and evaluate response  - Consider cultural and social influences on pain and pain management  - Notify physician/advanced practitioner if interventions unsuccessful or patient reports new pain  Outcome: Progressing     Problem: Neurological Deficit  Goal: Neurological status is stable or improving  Description: Interventions:  - Monitor and assess patient's level of consciousness, motor function, sensory function, and level of assistance needed for ADLs  - Monitor and report changes from baseline  Collaborate with interdisciplinary team to initiate plan and implement interventions as ordered  - Provide and maintain a safe environment  - Consider seizure precautions  - Consider fall precautions  - Consider aspiration precautions  - Consider bleeding precautions  Outcome: Progressing     Problem: Communication Impairment  Goal: Ability to express needs and understand communication  Description: Assess patient's communication skills and ability to understand information  Patient will demonstrate use of effective communication techniques, alternative methods of communication and understanding even if not able to speak  - Encourage communication and provide alternate methods of communication as needed  - Collaborate with case management/ for discharge needs  - Include patient/family/caregiver in decisions related to communication    Outcome: Progressing     Problem: Potential for Aspiration  Goal: Non-ventilated patient's risk of aspiration is minimized  Description: Assess and monitor vital signs, respiratory status, and labs (WBC)   Monitor for signs of aspiration (tachypnea, cough, rales, wheezing, cyanosis, fever)  - Assess and monitor patient's ability to swallow  - Place patient up in chair to eat if possible  - HOB up at 90 degrees to eat if unable to get patient up into chair   - Supervise patient during oral intake  - Instruct patient/ family to take small bites  - Instruct patient/ family to take small single sips when taking liquids    - Follow patient-specific strategies generated by speech pathologist   Outcome: Progressing

## 2022-01-12 NOTE — ASSESSMENT & PLAN NOTE
Lab Results   Component Value Date    HGBA1C 5 8 (H) 01/05/2022       Recent Labs     01/11/22  1100 01/11/22  1619 01/11/22 2014 01/12/22  0609   POCGLU 201* 206* 173* 152*       Blood Sugar Average: Last 72 hrs:  (P) 177     A1c 5 8 on 1/5  Home regimen: metformin 1000mg BID and Tradjenta 5mg daily  Currently on SSI  Consider restarting metformin as renal function improves  Continue QID accuchecks and cons  carb diet  Continue to follow-up with PCP as outpatient

## 2022-01-12 NOTE — ASSESSMENT & PLAN NOTE
Rate/rhythm controlled on Lopressor 25mg Q12hrs and Sotolol 80mg BID  Anticoagulated on Eliquis 5mg BID  Stable  IM consultants following while at [facility] with Dr Amy Hudson

## 2022-01-12 NOTE — ASSESSMENT & PLAN NOTE
Presented with 4 falls and mental status change  MRI brain confirmed a right basal ganglia and right caudate ischemic CVA  Etiology: Small vessel disease most likely     Secondary CVA ppx: managed on Eliquis 5mg BID, Lipitor 80mg daily, and now aspirin 81mg daily  Continue CVA education while at [address] [place] [address] education and reduction of modifiable risk factors  Monitor mood  Follow-up with Neurology as outpatient

## 2022-01-12 NOTE — PCC SPEECH THERAPY
Pt completing formalized cognitive linguistic assessment, CLQT in which overall score was 2 2 out of 4 0 when compared to age matched peers between 65-80 year old  Pt while pt demonstrated deficits across all cognitive domains assessed, the most impacted were attention, executive function and visuospatial skills  Additionally noted during assessment was some L inattention to items on the pages during tasks, as well as significantly decreased comprehension given directions for symbol trails task which pt did not comprehend directions at all, noting impulsivity trying to write over SLP, etc  Pt exhibiting decreased awareness given ANY errors which presented during the assessment as well  When engaging in interview in addition to completing assessment, pt was accurate in eliciting reasoning for hospitalization due to her falls, but probing questions needed for true reasoning of "stroke " Pt was able to recall current place/city as well as current month but required cues for date stating "23 " Pt does report independence prior to admission, as 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 accessible house  Pt reported completing I ADL's such as cooking, cleaning, laundry, management of medications and driving, but brother takes care of finances  Pt is  and stated having supportive children, dtr and son, but also her friend, Connre (who was in visiting prior to initiation of session) also is very supportive and lives close to pt  Even in d/w pt's friend as she was leaving, pt did verbalize to her about leaving clothing on the door knob for her to bring in  Pt's friend noted to reorient pt to current situation as well   At this time, pt is noted to demonstrate cognitive linguistic deficits including the following: decreased comprehension, decreased executive function skills (problem solving, reasoning, sequencing, judgement, insight), decreased ST/working memory, fatigue, slower processing and suspected visual spatial difficulty, which currently impact pt's overall safety and functional mobility  While in the acute rehab center, pt will benefit from skilled SLP services to maximize overall functional independence of cognitive linguistic skills to decrease burden of care for family at time of discharge  Update from week of 1/19/2022: Pt conts to receive skilled SLP services targeting cognitive linguistic communication needs  Pt is making progress towards her goals and is currently functioning at supervision for expression and comprehension and mod A problem solving and memory  Pt conts with barriers in decreased executive function skills (problem solving, reasoning, sequencing, judgement, insight), decreased ST/working memory, fatigue, and slower processing which overall impact pt's safety and functional mobility  Recommendations at this time are for 24hr supervision/assistance at home  Pt is scheduled for discharge home with home PT/OT therapy services however would benefit from further skilled SLP services in outpt setting once ready to maximize overall functional independence of cognitive linguistic skills to decrease burden of care for family at time  1 person + 1 person to manage equipment

## 2022-01-12 NOTE — ASSESSMENT & PLAN NOTE
Continue metoprolol tartrate 25mg BID and sotalol 80mg BID for rate control  Continue Eliquis 5mg BID for anticoagulation  Monitor routine VS   Replete electrolytes as needed  Continue to follow-up with Dr Alina Celaya as outpatient

## 2022-01-12 NOTE — QUICK NOTE
[facility] personally reviewed  Radiology results below:    IMPRESSION:     No new intracranial abnormality      Continued evolution of late subacute to chronic right basal ganglia infarct  No hemorrhage or significant mass effect      Moon Mejia MD  PM&R

## 2022-01-12 NOTE — ASSESSMENT & PLAN NOTE
Urinary retention in acute setting  Completed 5 day course of abx for E coli UTI  Bonner placed on 1/7  Consider discontinuing later this week

## 2022-01-12 NOTE — ASSESSMENT & PLAN NOTE
Stable  Continue Buspar 7 5mg Q12 and Effexor 37 5mg daily  Supportive counseling as needed  Follows with Palliative as outpatient

## 2022-01-12 NOTE — ASSESSMENT & PLAN NOTE
Grade I DD on echocardiogram  Lasix on HOLD due to ADITYA  Patient euvolemic, continue to monitor  Continue beta blockers, ARB      IM consultants following and managing   Follows with Dr Alysia Anderson

## 2022-01-12 NOTE — PROGRESS NOTES
SLP Cognitive Assessment       01/12/22 1500   Pain Assessment   Pain Assessment Tool 0-10   Pain Score No Pain   Restrictions/Precautions   Precautions Bed/chair alarms;Cognitive; Fall Risk; Bonner; Supervision on toilet/commode;Visual deficit  (? L inattention)   Cognitive Linguisitic Assessments   Cognitive Linguistic Quick Test (CLQT) Pt completing formalized cognitive linguistic assessment, CLQT+  Overall score when compared to age matched peers between 79 - 80 yrs  Score was 2 2 out of 4 0, which indicates cognitive skills to be MODERATELY impaired  The following cognitive domain scores are as follows: Attention: score of 28, indicating severe impairment; Memory: score of 135, indicating mild impairment; Executive Functions: score of 9, indicating moderate impairment; Language: score of 27, indicating mild impairment; Visuospatial Skills: score of 23, indicating moderate impairment; Clock Drawing: score of 7, indicating moderate impairment  Of note, pt was below criterion cutoff scores on 5 out of 10 tasks completed during this assesssment  Per results from assessment, SLP services are recommended at this time to maximize overall cognitive linguistic skills while in the acute rehab setting  Comprehension   QI: Comprehension 2  Sometimes Understands: Understands only basic conversations or simple, direct phrases  Frequently requires cues to understand   Comprehension (FIM) 3 - Understands basic info/conversation 50-74% of time   Expression   QI: Expression 3   Exhibits some difficulty with expressing needs and ideas (e g , some words or finishing thoughts) or speech is not clear   Expression (FIM) 4 - Expresses basic info/needs 75-90% of time   Social Interaction   Social Interaction (FIM) 5 - Interacts appropriately with others 90% of time   Problem Solving   Problem solving (FIM) 2 - Needs direction more than ½ time to initiate, plan or complete simple tasks   Memory   Memory (FIM) 3 - Recognizes, recalls/performs 50-74%   Speech/Language/Cognition Assessmetn   Treatment Assessment Pt completing formalized cognitive linguistic assessment, CLQT in which overall score was 2 2 out of 4 0 when compared to age matched peers between 65-80 year old  Pt while pt demonstrated deficits across all cognitive domains assessed, the most impacted were attention, executive function and visuospatial skills  Additionally noted during assessment was some L inattention to items on the pages during tasks, as well as significantly decreased comprehension given directions for symbol trails task which pt did not comprehend directions at all, noting impulsivity trying to write over SLP, etc  Pt exhibiting decreased awareness given ANY errors which presented during the assessment as well  When engaging in interview in addition to completing assessment, pt was accurate in eliciting reasoning for hospitalization due to her falls, but probing questions needed for true reasoning of "stroke " Pt was able to recall current place/city as well as current month but required cues for date stating "23 " Pt does report independence prior to admission, as 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 accessible house  Pt reported completing I ADL's such as cooking, cleaning, laundry, management of medications and driving, but brother takes care of finances  Pt is  and stated having supportive children, dtr and son, but also her friend, Terrance John (who was in visiting prior to initiation of session) also is very supportive and lives close to pt  Even in d/w pt's friend as she was leaving, pt did verbalize to her about leaving clothing on the door knob for her to bring in  Pt's friend noted to reorient pt to current situation as well   At this time, pt is noted to demonstrate cognitive linguistic deficits including the following: decreased comprehension, decreased executive function skills (problem solving, reasoning, sequencing, judgement, insight), decreased ST/working memory, fatigue, slower processing and suspected visual spatial difficulty, which currently impact pt's overall safety and functional mobility  While in the acute rehab center, pt will benefit from skilled SLP services to maximize overall functional independence of cognitive linguistic skills to decrease burden of care for family at time of discharge  SLP Therapy Minutes   SLP Time In 1500   SLP Time Out 1550   SLP Total Time (minutes) 50   SLP Mode of treatment - Individual (minutes) 50   SLP Mode of treatment - Concurrent (minutes) 0   SLP Mode of treatment - Group (minutes) 0   SLP Mode of treatment - Co-treat (minutes) 0   SLP Mode of Treatment - Total time(minutes) 50 minutes   SLP Cumulative Minutes 50   Therapy Time missed   Time missed?  No

## 2022-01-13 LAB
GLUCOSE SERPL-MCNC: 137 MG/DL (ref 65–140)
GLUCOSE SERPL-MCNC: 156 MG/DL (ref 65–140)
GLUCOSE SERPL-MCNC: 212 MG/DL (ref 65–140)
GLUCOSE SERPL-MCNC: 307 MG/DL (ref 65–140)

## 2022-01-13 PROCEDURE — 97112 NEUROMUSCULAR REEDUCATION: CPT

## 2022-01-13 PROCEDURE — 94762 N-INVAS EAR/PLS OXIMTRY CONT: CPT

## 2022-01-13 PROCEDURE — 97116 GAIT TRAINING THERAPY: CPT

## 2022-01-13 PROCEDURE — 99233 SBSQ HOSP IP/OBS HIGH 50: CPT | Performed by: PHYSICAL MEDICINE & REHABILITATION

## 2022-01-13 PROCEDURE — 97129 THER IVNTJ 1ST 15 MIN: CPT

## 2022-01-13 PROCEDURE — 97130 THER IVNTJ EA ADDL 15 MIN: CPT

## 2022-01-13 PROCEDURE — 97110 THERAPEUTIC EXERCISES: CPT

## 2022-01-13 PROCEDURE — 82948 REAGENT STRIP/BLOOD GLUCOSE: CPT

## 2022-01-13 PROCEDURE — 97530 THERAPEUTIC ACTIVITIES: CPT

## 2022-01-13 PROCEDURE — 99232 SBSQ HOSP IP/OBS MODERATE 35: CPT | Performed by: INTERNAL MEDICINE

## 2022-01-13 PROCEDURE — 97535 SELF CARE MNGMENT TRAINING: CPT

## 2022-01-13 RX ADMIN — AMLODIPINE BESYLATE 5 MG: 5 TABLET ORAL at 08:19

## 2022-01-13 RX ADMIN — Medication 2000 UNITS: at 08:21

## 2022-01-13 RX ADMIN — INSULIN LISPRO 4 UNITS: 100 INJECTION, SOLUTION INTRAVENOUS; SUBCUTANEOUS at 11:35

## 2022-01-13 RX ADMIN — APIXABAN 5 MG: 5 TABLET, FILM COATED ORAL at 08:21

## 2022-01-13 RX ADMIN — APIXABAN 5 MG: 5 TABLET, FILM COATED ORAL at 17:16

## 2022-01-13 RX ADMIN — PANTOPRAZOLE SODIUM 40 MG: 40 TABLET, DELAYED RELEASE ORAL at 06:33

## 2022-01-13 RX ADMIN — METOPROLOL TARTRATE 25 MG: 25 TABLET, FILM COATED ORAL at 21:13

## 2022-01-13 RX ADMIN — LOSARTAN POTASSIUM 25 MG: 25 TABLET, FILM COATED ORAL at 08:20

## 2022-01-13 RX ADMIN — SOTALOL HYDROCHLORIDE 80 MG: 80 TABLET ORAL at 17:17

## 2022-01-13 RX ADMIN — BUSPIRONE HYDROCHLORIDE 7.5 MG: 15 TABLET ORAL at 21:12

## 2022-01-13 RX ADMIN — ATORVASTATIN CALCIUM 80 MG: 80 TABLET, FILM COATED ORAL at 17:16

## 2022-01-13 RX ADMIN — FOLIC ACID 1 MG: 1 TABLET ORAL at 08:21

## 2022-01-13 RX ADMIN — INSULIN LISPRO 1 UNITS: 100 INJECTION, SOLUTION INTRAVENOUS; SUBCUTANEOUS at 08:22

## 2022-01-13 RX ADMIN — VENLAFAXINE HYDROCHLORIDE 37.5 MG: 37.5 CAPSULE, EXTENDED RELEASE ORAL at 21:16

## 2022-01-13 RX ADMIN — CYANOCOBALAMIN TAB 1000 MCG 500 MCG: 1000 TAB at 08:20

## 2022-01-13 RX ADMIN — ASPIRIN 81 MG: 81 TABLET, COATED ORAL at 08:21

## 2022-01-13 RX ADMIN — BUSPIRONE HYDROCHLORIDE 7.5 MG: 15 TABLET ORAL at 08:20

## 2022-01-13 RX ADMIN — SOTALOL HYDROCHLORIDE 80 MG: 80 TABLET ORAL at 08:19

## 2022-01-13 RX ADMIN — LORATADINE 10 MG: 10 TABLET ORAL at 08:19

## 2022-01-13 RX ADMIN — METOPROLOL TARTRATE 25 MG: 25 TABLET, FILM COATED ORAL at 08:21

## 2022-01-13 NOTE — PROGRESS NOTES
01/13/22 1010   Assessment   Treatment Assessment Therpist contacted pts Dtr Vicky Musa [phone]) in order to discuss pts current LOF and recommendations for DC  Therapist spoke with dtr and reporting pts current LOF where pt does not really need physical A but  mainly needs supervision  In addition, therapist discussed with dtr pts 8/30 MOCA score which places her at severe cognitive deficit with recommendations of supervision as pt has dec cognition, safety, insight and problem solving  Dtr reports that between her brorther and HHA they can provide superivsion at DC and understand recommendations  In addition, dtr rports that her cousin has been A pts brother last couple of months  Therpist to discuss with CM to provide HHA to dtr when calling about team meeting  Prognosis Fair   Problem List Decreased strength;Decreased endurance; Impaired balance;Decreased mobility; Decreased cognition;Decreased safety awareness; Obesity   Barriers to Discharge Inaccessible home environment;Decreased caregiver support   Plan   Treatment/Interventions ADL retraining;Functional transfer training; Therapeutic exercise; Endurance training;Patient/family training;Bed mobility; Compensatory technique education   Progress Progressing toward goals   Recommendation   OT Discharge Recommendation Home with home health rehabilitation   Equipment Recommended Bedside commode   OT - OK to Discharge No   OT Therapy Minutes   OT Time In 1010   OT Time Out 1020   OT Total Time (minutes) 10   OT Mode of treatment - Individual (minutes) 10   OT Mode of treatment - Concurrent (minutes) 0   OT Mode of treatment - Group (minutes) 0   OT Mode of treatment - Co-treat (minutes) 0   OT Mode of Treatment - Total time(minutes) 10 minutes   OT Cumulative Minutes 180   Therapy Time missed   Time missed?  No

## 2022-01-13 NOTE — PLAN OF CARE
Problem: PAIN - ADULT  Goal: Verbalizes/displays adequate comfort level or baseline comfort level  Description: Interventions:  - Encourage patient to monitor pain and request assistance  - Assess pain using appropriate pain scale  - Administer analgesics based on type and severity of pain and evaluate response  - Implement non-pharmacological measures as appropriate and evaluate response  - Consider cultural and social influences on pain and pain management  - Notify physician/advanced practitioner if interventions unsuccessful or patient reports new pain  Outcome: Progressing     Problem: INFECTION - ADULT  Goal: Absence or prevention of progression during hospitalization  Description: INTERVENTIONS:  - Assess and monitor for signs and symptoms of infection  - Monitor lab/diagnostic results  - Monitor all insertion sites, i e  indwelling lines, tubes, and drains  - Monitor endotracheal if appropriate and nasal secretions for changes in amount and color  - [place] appropriate cooling/warming therapies per order  - Administer medications as ordered  - Instruct and encourage patient and family to use good hand hygiene technique  - Identify and instruct in appropriate isolation precautions for identified infection/condition  Outcome: Progressing     Problem: Neurological Deficit  Goal: Neurological status is stable or improving  Description: Interventions:  - Monitor and assess patient's level of consciousness, motor function, sensory function, and level of assistance needed for ADLs  - Monitor and report changes from baseline  Collaborate with interdisciplinary team to initiate plan and implement interventions as ordered  - Provide and maintain a safe environment  - Consider seizure precautions  - Consider fall precautions  - Consider aspiration precautions  - Consider bleeding precautions    Outcome: Progressing     Problem: Potential for Aspiration  Goal: Non-ventilated patient's risk of aspiration is minimized  Description: Assess and monitor vital signs, respiratory status, and labs (WBC)  Monitor for signs of aspiration (tachypnea, cough, rales, wheezing, cyanosis, fever)  - Assess and monitor patient's ability to swallow  - Place patient up in chair to eat if possible  - HOB up at 90 degrees to eat if unable to get patient up into chair   - Supervise patient during oral intake  - Instruct patient/ family to take small bites  - Instruct patient/ family to take small single sips when taking liquids    - Follow patient-specific strategies generated by speech pathologist   Outcome: Progressing

## 2022-01-13 NOTE — CASE MANAGEMENT
Cm spoke to pt's dtr over the phone to discuss cm role, rehab routine, and team meeting update  Cm explained that a discharge date has not been set yet  Dtr states that they are still looking into 24/7 supervision  Cm explained skilled home services versus home health aides and insurance coverage  Home health aide list sent to her dtr by email at [email or web address]  Reviewed IMM with the dtr  Following to assist w/ d/c planning needs

## 2022-01-13 NOTE — ASSESSMENT & PLAN NOTE
ADITYA on CKD detail I  Baseline creatinine 1 5-1 8  Cr = 1 69  Lasix on HOLD as patient is euvolemic  Avoid nephrotoxic meds, monitor volume status     Losartan reduced from 50mg daily to 25mg daily by IM consultants

## 2022-01-13 NOTE — PROGRESS NOTES
PM&R PROGRESS NOTE:  Laurel Lees 66 y o  female MRN: [id number]  Unit/Bed#: -01 Encounter: [id number]        Rehab Diagnosis: Impairment of mobility, safety, Activities of Daily Living (ADLs), and cognitive/communication skills due to Stroke:  01 1  Left Body Involvement (Right Brain)  Right basal ganglia CVA     History of Present Illness:   Laurel Lees is a 66 y o  female with known stage 4 juan cancer undergoing chemotherapy every 3 weeks, A fib on Eliquis, HTN, HLD, CHF (grade 2 DD), GERD, CKD3, DM2, bilateral knee osteoarthritis, depression/anxiety who presented to the [facility] [address] on 1/3/22 due to 4 falls and leg contusion  Patient found to have a right buttock hematoma which remained stable  Patient developed altered mental status  CTH negative  MRI brain ultimately revealed a acute right basal ganglia ischemic CVA  MRA/MRV negative for occlusive disease  MRI brain with contrast negative for any enhancing lesions  Patient seen by Neurology and determined etiology of CVA small vessel disease  Aspirin was added to her secondary CVA ppx  Medically, treated for UTI with Ceftriaxone and Keflex and has completed treatment  Also required a Bonner placement on 1/8/22 for urinary retention,  Patient had diarrhea transiently which improved  Patient's BP medications were adjusted to allow for perimissive HTN post CVA  After medical stabilization, patient was found to have acute functional deficits from her baseline in mobility, self care, and cognition and was admitted to [facility] for acute inpatient rehabilitation  SUBJECTIVE: Patient seen face to face  No acute issues overnight  Patient slept well  Hopeful to get home soon  Discussed her [facility] results with her daughter  Discussed MOCA score with her daughter over the phone with OT  Daughter looking into 24/7 care options with her brother and hired services        ASSESSMENT: Stable, progressing      PLAN:    Rehabilitation  · Functional deficits: left hemiparesis, bilateral proximal musculature weakness, impaired proprioception/sensation in feet, mild left dysmetria, impaired balance, impaired mobility, self care, impaired cognition   Continue current rehabilitation plan of care to maximize function  I personally attended, reviewed, and discussed medical and functional updates in team conference today  Please refer to advance care planning note for details   Estimated Discharge: TBD, RETEAM ; probable 10-14 days ELOS    Physical Therapy Occupational Therapy Speech Therapy   Weight Bearing Status: Full Weight Bearing  Transfers: Minimal Assistance  Bed Mobility: Minimal Assistance  Amulation Distance (ft): 90 feet  Ambulation: Minimal Assistance  Assistive Device for Ambulation: Roller Walker  Number of Stairs: 4  Assistive Device for Stairs: Bilateral Hand Rails  Stair Assistance: Minimal Assistance  Ramp:  (TBA)  Discharge Recommendations: Home with:  [address] Nerissa with[de-identified] Home Physical Therapy   Eating:  (setup)  Grooming: Supervision  Bathing: Minimal Assistance  Bathing: Minimal Assistance  Upper Body Dressing: Supervision  Lower Body Dressing: Moderate Assistance  Toileting: Minimal Assistance  Tub/Shower Transfer: Moderate Assistance  Toilet Transfer: Minimal Assistance  Cognition: Exceptions to WNL  Cognition: Decreased Memory,Decreased Executive Functions,Decreased Safety  Orientation: Person,Place   Mode of Communication: Verbal  Cognition: Exceptions to WNL  Cognition: Decreased Memory,Decreased Executive Functions,Decreased Comprehension,Decreased Safety,Decreased Attention  Orientation: Person,Place,Situation  Discharge Recommendations: Home with:  [address] with[de-identified] Family Support,24 Hour Supervision,24 Hour Assisteance,Home Speech Therapy,Outpatient Speech Therapy (pending pt progress)         Pain   No major complaints     Tylenol available if needed    DVT prophylaxis   Managed on Eliquis     Bladder plan   +Bonner catheter in place - TOV tomorrow     Bowel plan   Continent      * Acute ischemic stroke [facility])  Assessment & Plan  Presented with 4 falls and mental status change  MRI brain confirmed a right basal ganglia and right caudate ischemic CVA  Etiology: Small vessel disease  Secondary CVA ppx: managed on Eliquis 5mg BID, Lipitor 80mg daily, and now aspirin 81mg daily  Continue CVA education while at [facility]  Continue education and reduction of modifiable risk factors  Monitor mood  Follow-up with Neurology as outpatient     Neuropathy due to chemotherapeutic drug [facility])  Assessment & Plan  Not painful  Consider topical lidoderm patch or gabapentin if pain develops    Chemotherapy-induced thrombocytopenia  Assessment & Plan  Plts = 167K  Continue to monitor    GERD (gastroesophageal reflux disease)  Assessment & Plan  Managed on Protonix (therapeutic exchange for Prilosec)    Diastolic congestive heart failure (HCC)  Assessment & Plan  Grade I DD on echocardiogram  Lasix on HOLD due to ADITYA  Patient euvolemic   IM consultants following and managing   Follows with Dr Gene Ruano    UTI (urinary tract infection)  Assessment & Plan  Diagnosed in acute care  Urine culture + E  Coli   Treated with 5 days antibiotics Ceftriaxone and Keflex  Also was on oral vancomycin to prevent recurrent C   Diff  Asymptomatic   Currently with Bonner catheter for urinary retention - TOV during ARC    Urinary retention  Assessment & Plan  Likely related to UTI  Bonner catheter inserted 1/8/22  TOV during ARC    Ground glass opacity present on imaging of lung  Assessment & Plan  Found incidentally on CT Chest  COVID - 19 negative   PCP to follow as outpatient     Atrial fibrillation [facility])  Assessment & Plan  Rate/rhythm controlled on Lopressor 25mg Q12hrs and Sotolol 80mg BID  Anticoagulated on Eliquis 5mg BID  Stable  IM consultants following while at [facility] with Dr Nahomy Curran Plan  Managed on home regimen of Buspar 7 5mg BID and Effexor XR 37 5 QHS  Mood is stable  Consult neuropsychology if needed    Diabetes type 2, controlled [facility])  Assessment & Plan  Lab Results   Component Value Date    HGBA1C 5 8 (H) 01/05/2022     Managed here on SSI  At home was on Metformin and Tradjenta  Continue CCD  Nutrition consulted   IM consultants following and managing     Hypertension  Assessment & Plan  Managed here on Norvasc 5mg BID, Lopressor 25mg Q12h, Losartan 25mg daily  Lasix on HOLD  At home on same regimen as above  BP stable   Follows with Dr Akua Yi    Chronic kidney disease, stage 3 ([facility] [address] )  Assessment & Plan  ADITYA on CKD  Follow BMP in AM  Lasix on HOLD  Losartan reduced from 50mg daily to 25mg daily by IM consultants    Lung cancer [facility])  Assessment & Plan  Known stage 4 adenocarcinoma of lung   · Follows with Dr Leah Landa  · [facility] Chemotherapy every 3 weeks  Last infusion was 12/27/21  Missed her 1/7/22 infusion due to hospitalization  · Follow-up with Dr Leah Landa as outpatient   CT Chest: Scattered groundglass opacities in both lungs which are new from prior exam   Consider infectious or inflammatory etiologies including Covid viral pneumonia  Previous right lower lobe lung mass has diminished since July  Previous left upper lobe tumor has also decreased in size but appears more atelectatic  Increasing water attenuation pleural fluid, partially loculated pleural fluid in the left lower lung  Follow-up per cancer imaging protocol  Contusion of left lower leg  Assessment & Plan  Sustained during fall  X-ray of right ankle is negative for fracture, however showing linear calcifications associated with the plantar fascia and Achilles tendon, these are chronic  Buttocks Hematoma  Assessment & Plan  Sustained during fall  CT showing Suspected small post traumatic hematoma measuring 2 cm in the soft tissues of the right buttock/perianal region    No active bleeding  Stable H&H  No overt pain complaints         Appreciate IM consultants medical co-management  Labs, medications, and imaging personally reviewed  ROS:  A ten point review of systems was completed on 01/13/22 and pertinent positives are listed in subjective section  All other systems reviewed were negative  OBJECTIVE:   /65 (BP Location: Right arm)   Pulse 75   Temp 98 °F (36 7 °C) (Tympanic)   Resp 18   Ht 5' 4" (1 626 m)   Wt 74 9 kg (165 lb 2 oz)   SpO2 (!) 89%   BMI 28 34 kg/m²     Physical Exam  Vitals and nursing note reviewed  Constitutional:       General: She is not in acute distress  HENT:      Head: Normocephalic and atraumatic  Nose: Nose normal       Mouth/Throat:      Mouth: Mucous membranes are moist    Eyes:      Conjunctiva/sclera: Conjunctivae normal    Cardiovascular:      Rate and Rhythm: Normal rate and regular rhythm  Pulses: Normal pulses  Pulmonary:      Effort: Pulmonary effort is normal       Breath sounds: Normal breath sounds  No wheezing or rales  Abdominal:      General: Bowel sounds are normal  There is no distension  Palpations: Abdomen is soft  Tenderness: There is no abdominal tenderness  Genitourinary:     Comments: +bahena in place   Musculoskeletal:         General: No swelling  Cervical back: Neck supple  Skin:     General: Skin is warm  Neurological:      Mental Status: She is alert and oriented to person, place, and time  Motor: Weakness (left leg 3+/5 HF, 4-/5 KE, KF, 4/5 DF, EHL, PF) present        Comments: Decreased memory and insight  Slow processing    Psychiatric:         Mood and Affect: Mood normal           Lab Results   Component Value Date    WBC 9 70 01/12/2022    HGB 11 6 (L) 01/12/2022    HCT 35 4 (L) 01/12/2022    MCV 87 01/12/2022     01/12/2022     Lab Results   Component Value Date    SODIUM 135 (L) 01/12/2022    K 3 8 01/12/2022     01/12/2022    CO2 31 (H) 01/12/2022    BUN 35 (H) 01/12/2022 CREATININE 1 76 (H) 01/12/2022    GLUC 164 (H) 01/12/2022    CALCIUM 8 9 01/12/2022     Lab Results   Component Value Date    INR 1 13 01/03/2022    PROTIME 14 5 01/03/2022           Current Facility-Administered Medications:     acetaminophen (TYLENOL) tablet 650 mg, 650 mg, Oral, Q6H PRN, Jerry Coughlin MD    amLODIPine (NORVASC) tablet 5 mg, 5 mg, Oral, BID, EDUARDO Guerra, 5 mg at 01/13/22 0819    apixaban (ELIQUIS) tablet 5 mg, 5 mg, Oral, BID, Jerry Coughlin MD, 5 mg at 01/13/22 0932    aspirin (ECOTRIN LOW STRENGTH) EC tablet 81 mg, 81 mg, Oral, Daily, Jerry Coughlin MD, 81 mg at 01/13/22 9394    atorvastatin (LIPITOR) tablet 80 mg, 80 mg, Oral, Daily With Yoan Wilder MD, 80 mg at 01/12/22 1724    bisacodyl (DULCOLAX) rectal suppository 10 mg, 10 mg, Rectal, Daily PRN, Jerry Coughlin MD    busPIRone (BUSPAR) tablet 7 5 mg, 7 5 mg, Oral, BID, Jerry Coughlin MD, 7 5 mg at 01/13/22 0820    cholecalciferol (VITAMIN D3) tablet 2,000 Units, 2,000 Units, Oral, Daily, Jerry Coughlin MD, 2,000 Units at 01/13/22 2284    cyanocobalamin (VITAMIN B-12) tablet 500 mcg, 500 mcg, Oral, Daily, Jerry Coughlin MD, 500 mcg at 01/13/22 0820    docusate sodium (COLACE) capsule 100 mg, 100 mg, Oral, BID PRN, Jerry Coughlin MD    folic acid (FOLVITE) tablet 1 mg, 1 mg, Oral, Daily, Jerry Coughlin MD, 1 mg at 01/13/22 8014    insulin lispro (HumaLOG) 100 units/mL subcutaneous injection 1-6 Units, 1-6 Units, Subcutaneous, TID AC, 4 Units at 01/13/22 1135 **AND** Fingerstick Glucose (POCT), , , TID AC, EDUARDO Guerra    loratadine (CLARITIN) tablet 10 mg, 10 mg, Oral, Daily, Jerry Coughlin MD, 10 mg at 01/13/22 0819    losartan (COZAAR) tablet 25 mg, 25 mg, Oral, Daily, EDUARDO Guerra, 25 mg at 01/13/22 0820    metoprolol tartrate (LOPRESSOR) tablet 25 mg, 25 mg, Oral, Q12H [facility], Jerry Coughlin MD, 25 mg at 01/13/22 0821    ondansetron (ZOFRAN-ODT) dispersible tablet 4 mg, 4 mg, Oral, Q6H PRN, Salome Rasmussen Elle Manuel MD    pantoprazole (PROTONIX) EC tablet 40 mg, 40 mg, Oral, Early Morning, Dale Petty MD, 40 mg at 01/13/22 5411    polyethylene glycol (MIRALAX) packet 17 g, 17 g, Oral, Daily PRN, Dale Petty MD    sotalol (BETAPACE) tablet 80 mg, 80 mg, Oral, BID, Dale Petty MD, 80 mg at 01/13/22 0819    venlafaxine (EFFEXOR-XR) 24 hr capsule 37 5 mg, 37 5 mg, Oral, HS, Dale Petty MD, 37 5 mg at 01/12/22 2109    History reviewed  No pertinent past medical history  Patient Active Problem List    Diagnosis Date Noted    Acute ischemic stroke ([place] [address] ) 01/05/2022    Dyslipidemia 01/12/2022    Lethargy 01/12/2022    UTI (urinary tract infection) 67/47/0119    Diastolic congestive heart failure ([place] [address] ) 01/11/2022    GERD (gastroesophageal reflux disease) 01/11/2022    Chemotherapy-induced thrombocytopenia 01/11/2022    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[place] [address] ) 01/11/2022    Encephalopathy 01/06/2022    Urinary retention 01/06/2022    Confusion     Hypomagnesemia 01/04/2022    Ground glass opacity present on imaging of lung 01/04/2022    Buttocks Hematoma 01/03/2022    Contusion of left lower leg 01/03/2022    Lung cancer ([place] [address] ) 01/03/2022    Chronic kidney disease, stage 3 ([place] [address] ) 01/03/2022    Hypertension 01/03/2022    Diabetes type 2, controlled ([place] [address] ) 01/03/2022    Depression 01/03/2022    Hypokalemia 01/03/2022    Atrial fibrillation ([place] [address] ) 01/03/2022          Dale Petty MD    Total time spent:  45 minutes with more than 50% spent counseling/coordinating care  Counseling includes discussion with patient re: progress and discussion with patient of his/her current medical/functional state/information  Coordination of patient's care was performed in conjunction with consulting services  Time invested included review of patient's labs, vitals, and management of their comorbidities with continued monitoring   The care of the patient was extensively discussed and appropriate treatment plan was formulated unique for this patient  ** Please Note:  voice to text software may have been used in the creation of this document   Although proof errors in transcription or interpretation are a potential of such software**

## 2022-01-13 NOTE — PCC CARE MANAGEMENT
I agree with medication and lifestyle modification changes.  He needs a repeat A1c and lipid panel in 3 months   Pt is participating in therapy  She lives in a single family home with her brother  HSe was independent prior to admission  She can live on 1 level and has no stairs to enter  Pt's cousin is currently staying with her brother whom pt cares for but this is temporary team reports  Pt's dtr is looking into 24/7 HA upon discharge  PT has grab bars, a shower chair, roller walker and a mechanical lift  Pt is scheduled for d/c on 1/21 with 24/7 support and home PT, OT, and RN through [facility]  Following to assist w/ d/c planning needs

## 2022-01-13 NOTE — ASSESSMENT & PLAN NOTE
Continue home regimen of Losartan 50mg daily and metoprolol tartrate 25mg BID  Started on amlodipine 5mg BID in acute setting - continue at this time  Started on Lasix 40mg daily in acute setting - only taking as needed at home  Currently on hold  Losartan restarted on 1/11 - decreased dose to 25mg daily on 1/12 due to lethargy/possible hypoperfusion  Monitor BP with VS   Holding parameters for SBP <130  Continue to follow-up with Dr Rupali Wright as outpatient

## 2022-01-13 NOTE — TEAM CONFERENCE
Acute RehabilitationTeam Conference Note  Date: 1/13/2022   Time: 1:17 PM       Patient Name:  Pasquale Gutierrez Record Number: [id number]   YOB: 1950  Sex: Female          Room/Bed:  [facility] 268/[facility] 937-50  Payor Info:  Payor: MEDICARE / Plan: MEDICARE A AND B / Product Type: Medicare A & B Fee for Service /      Admitting Diagnosis: Infarction of right basal ganglia ([facility] ) [I63 9]   Admit Date/Time:  1/11/2022  3:39 PM  Admission Comments: No comment available     Primary Diagnosis:  Acute ischemic stroke ([place] [address] )  Principal Problem: Acute ischemic stroke [facility])    Patient Active Problem List    Diagnosis Date Noted    Dyslipidemia 01/12/2022    Lethargy 01/12/2022    UTI (urinary tract infection) 27/53/4103    Diastolic congestive heart failure ([place] [address] ) 01/11/2022    GERD (gastroesophageal reflux disease) 01/11/2022    Chemotherapy-induced thrombocytopenia 01/11/2022    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[place] [address] ) 01/11/2022    Encephalopathy 01/06/2022    Urinary retention 01/06/2022    Confusion     Acute ischemic stroke ([place] [address] ) 01/05/2022    Hypomagnesemia 01/04/2022    Ground glass opacity present on imaging of lung 01/04/2022    Buttocks Hematoma 01/03/2022    Contusion of left lower leg 01/03/2022    Lung cancer ([facility] ) 01/03/2022    Chronic kidney disease, stage 3 ([place] [address] ) 01/03/2022    Hypertension 01/03/2022    Diabetes type 2, controlled ([place] [address] ) 01/03/2022    Depression 01/03/2022    Hypokalemia 01/03/2022    Atrial fibrillation ([place] [address] ) 01/03/2022       Physical Therapy:    Weight Bearing Status: Full Weight Bearing  Transfers: Minimal Assistance  Bed Mobility: Minimal Assistance  Amulation Distance (ft): 90 feet  Ambulation: Minimal Assistance  Assistive Device for Ambulation: Roller Walker  Number of Stairs: 4  Assistive Device for Stairs: Bilateral [employer]  Stair Assistance: Minimal Assistance  Ramp:  (TBA)  Discharge Recommendations: Home with:  [address] with[de-identified] Home Physical Therapy    Pt 66 yr old female presented to [facility] 1/3/22 s/p multiple falls at home  With extensive testing, pt was found to have a R buttock hematoma, scattered opacities in B lungs (pt with h/o stage 4 lung CA, not on O2- gets chemo every 3 weeks), +toxic metabolic encephalopathy with worsening confusion  +UTI, and MRI revealed +R BG/caudate infarct/ischemia- likely attributed afib/hypercoaguable state due to malignancy, ongoing urinary retention requiring bahena placement, bahena remains at time of eval  Other PMH: afib, HTN, HLD, CHF, GERD, CKD3, DM2, B knee OA  At baseline 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, use of kelley and pt A with all ADLs  Pts cousin currently is staying with brother and plans to stay until end of the month  Overall pt is poor historian, poor recall of recent events, does recall falling several times  Split level home with 0 MARY JANE, 6 steps inside with B HR to living space there is also an elevator for her brother  Pt was I PTA without AD, however reports ongoing functional decline with chemo txs over passed several weeks  Pt with slowed movements and responses, evident lack of insight to deficits/impaired judgement  Unable to recall having bahena and insistent on using BR to void  Pt presents needing min A for gait and transfers with RW w/ repeated cues for hand placement and safety  Pt to greatly benefit from skilled PT intervention to maximize functional strength and mobility to improve independence  Education provided on status, ELOS, goals, and POC, pt and friend at bedside verablize understanding  Pt demonstrates fair rehab potential to reach S goals (pending support at home at time of DC) in ELOS x2 weeks  Anticipate Home PT to be recommended, pt has RW and SPC         Occupational Therapy:  Eating:  (setup)  Grooming: Supervision  Bathing: Minimal Assistance  Bathing: Minimal Assistance  Upper Body Dressing: Supervision  Lower Body Dressing: Moderate Assistance  Toileting: Minimal Assistance  Tub/Shower Transfer: Moderate Assistance  Toilet Transfer: Minimal Assistance  Cognition: Exceptions to WNL  Cognition: Decreased Memory,Decreased Executive Functions,Decreased Safety  Orientation: Person,Place  Discharge Recommendations: Home with:   [address] with[de-identified] Home Occupational Therapy,First Floor Setup (S vs Jackson pending functioanl performance and cog)       1/12/22  Pt is a 66year old female that presented to [address] [place] [address] on 1/3/2022 as a level B trauma s/p multiple falls at home  Patient states she did hit her head during several falls, however denies LOC  Patient with complaints of right ankle pain  Right ankle x-ray was negative  CTH and CT cervical spine was negative for acute findings  CT of the chest/abdomen/pelvis showed suspected small post traumatic hematoma measuring 2cm in soft tissues of right buttock/perianal region; no active bleeding; scattered groundglass opacities in both lungs  COVID test resulted negative on 1/3/2022  Patient was continued on Eliquis and initiated on multimodal pain regimen for acute pain management  SLIM was consulted regarding suspected toxic metabolic encephalopathy, as patient with noted worsening confusion  Urinalysis was abnormal, and patient was initiated on Cipro, then transitioned to Rocephin  Of note, patient with recent Cdiff + October 2021, and was continued on prophylactic Vanco in setting of antibiotics for UTI  Urine culture showed E coli and patient was transitioned back to PO Keflex to complete 5 days treatment  Neurology was consulted, as MRI of the brain showed right basal ganglia/caudate infarct/ischemia  Likely etiology Afib/hypercoagulable state from malignancy  ECHO showed an EF of 60%, grade 1 abnormal relaxation, concentric remodeling, mild AVR  Repeat MRI of the brain on 1/5 showed no enhancing lesions  EEG was also obtained, which showed nonspecific slowing and no seizure activity  Additionally, patient was with noted new onset urinary retention and required bahena insertion on 1/8/2022  Of note, patient with history of metastatic lung cancer, and receives chemotherapy every 3 weeks  Pt supine in bed upon therapist arrival  Pt fatigued with eyes closed while answering home setup questions  At times pt demonstrating to be P historian  Pt reports that dtr is back in school, but when asked what she is studying pt unable to give clear answer and reporting "She is just doing it to get by"  Pt reports she lives in a split level home 0STE and 6 steps inside with HR  Pt reports she was indep with all ADL/IADL tasks and is main caregiver for brother who has muscular dystrophy, she utilized mechanical lift for transfers  Of note, in chart it is stated that pts cousin is staying with them to A and that dtr is looking ot hire HHA for DC, will verify with dtr as well  At this time pt req Reta/modA for ADLs and transfers  Pt does demonstrate dec cognition, dec insight to deficit and dec safety awareness despite educ  Pt seen for 90mins of skilled OT services with emphasis on self-care, transfers, balance, as well as therapist educated pt on rehab process, goal setting and ELOS which pt verbalized understanding  Pt presents with the following performance component deficits impacting ADL/IADL skills:  weakness, impaired balance, decreased endurance, decreased coordination, increased fall risk, new onset of impairment of functional mobility, decreased ADLS, decreased IADLS, decreased activity tolerance, decreased safety awareness, impaired judgement, dec cognition and SOB upon exertion, that result in activity limitations and/or participation restrictions   Pt to continue to benefit from continued acute rehab OT services during hospital stay to address defined deficits and to maximize level of functional independence in the following Occupational Performance areas: grooming, bathing/shower, toilet hygiene, dressing, medication management, functional mobility, community mobility, clothing management, cleaning, meal prep and household maintenance  Treatment approaches may include: OT eval, self-care, there ex, therapeutic activity, modalities  Pt presents with good rehab potential  This evaluation requires clinical decision making of high complexity, because the patient presents with comorbidites that affect occupational performance and required significant modification of tasks or assistance with consideration of multiple treatment options  Pt is unsafe to D/C home at this time, recommending ELOS 2 weeks to achieve S level goals with least restrictive device to address these areas and resume prior occupational roles to maximize independence to reduce risk of fall and decrease risk of readmission  Goals to continue to be assessed and determine if pt will have supervision at [place]  Speech Therapy:  Mode of Communication: Verbal  Cognition: Exceptions to WNL  Cognition: Decreased Memory,Decreased Executive Functions,Decreased Comprehension,Decreased Safety,Decreased Attention  Orientation: Person,Place,Situation  Discharge Recommendations: Home with:  [address] with[de-identified] Family Support,24 Hour Supervision,24 Hour Assisteance,Home Speech Therapy,Outpatient Speech Therapy (pending pt progress)  Pt completing formalized cognitive linguistic assessment, CLQT in which overall score was 2 2 out of 4 0 when compared to age matched peers between 65-80 year old  Pt while pt demonstrated deficits across all cognitive domains assessed, the most impacted were attention, executive function and visuospatial skills   Additionally noted during assessment was some L inattention to items on the pages during tasks, as well as significantly decreased comprehension given directions for symbol trails task which pt did not comprehend directions at all, noting impulsivity trying to write over SLP, etc  Pt exhibiting decreased awareness given ANY errors which presented during the assessment as well  When engaging in interview in addition to completing assessment, pt was accurate in eliciting reasoning for hospitalization due to her falls, but probing questions needed for true reasoning of "stroke " Pt was able to recall current place/city as well as current month but required cues for date stating "23 " Pt does report independence prior to admission, as 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 accessible house  Pt reported completing I ADL's such as cooking, cleaning, laundry, management of medications and driving, but brother takes care of finances  Pt is  and stated having supportive children, dtr and son, but also her friend, Brittanie Damon (who was in visiting prior to initiation of session) also is very supportive and lives close to pt  Even in d/w pt's friend as she was leaving, pt did verbalize to her about leaving clothing on the door knob for her to bring in  Pt's friend noted to reorient pt to current situation as well  At this time, pt is noted to demonstrate cognitive linguistic deficits including the following: decreased comprehension, decreased executive function skills (problem solving, reasoning, sequencing, judgement, insight), decreased ST/working memory, fatigue, slower processing and suspected visual spatial difficulty, which currently impact pt's overall safety and functional mobility  While in the acute rehab center, pt will benefit from skilled SLP services to maximize overall functional independence of cognitive linguistic skills to decrease burden of care for family at time of discharge         Nursing Notes:  Appetite: Good  Diet Type: Diabetic                                                                        Pain Score: 0                       Hospital Pain Intervention(s): Repositioned,Rest          66 y o  female patient who originally presented to the hospital on 1/3/2022 due to multiple falls resulting in buttocks hematoma and leg contusion for which she was originally admitted under the Trauma Service  Due to complaints of headaches and altered mental status she was seen in consultation by Internal Medicine and was subsequently transferred to our service after an MRI of the brain revealed evidence of an acute ischemic stroke  She was seen in consultation by Neurology and had full workup  She was maintained on Eliquis which she was already on prior to the hospitalization but after discussion with her outpatient oncologist, aspirin was added to her regimen for concerned that she developed the stroke due to thrombosis related to her current chemotherapy regimen for lung cancer  She had initially been seen by PT and OT on admission and was cleared for home with VNA but subsequently did have a decline in her functionality in mental status while here in the hospital and then was recommended for rehab  In fact her hospital stay was quite complicated by alterations and fluctuations in her mental status  While here in the hospital she was treated for urinary tract infection and completed a 5 day course of antibiotics  Due to initial complaints of diarrhea we had requested a C diff sample but patient's diarrhea ceased and a stool sample could never be obtained  In addition she did have urinary retention for which a Bonner catheter had to be placed  Her blood pressure was initially quite severely elevated but given the acute stroke we allowed permissive hypertension initially and then subsequently we did bring her blood pressure under better control  She has underlying chronic kidney disease with some fluctuations but overall stability in her creatinine  She was found to have a ground-glass opacity in her lung on imaging but tested negative for COVID on 3 occasions during this hospital stay    At time of discharge she is overall improved though she does continue to have some episodic lethargy and confusion for which additional neurologic and neuropsych workup would be of benefit once she is medically optimized  Pt was admitted to [facility]  1/11/22  Acute ischemic stroke managed with Lipitor 80mg daily with dinner, Eliquis 5mg BID & ASA 81mg daily  HTN managed with Norvasc 5mg BID, Lopressor 25mg q12h & Lasix 40mg daily  Pt has a bahena catheter for urinary retention, will do voiding trial in 1 week  Atrial fib managed with Sotolol 80mg BID, Lopressor 25mg q12h & Eliquis 5mg BID  DM2 managed with diabetic diet & accu checks with SSI's before meals  This week we will monitor pt's vital signs & lab results  Pt is disoriented to time & will be reoriented to time & situation with each encounter  Pt will have safe transfers with use of call bell, hourly rounding & bed/chair alarm to prevent falls  Pt uses call bell appropriately & has not challenged bed alarm  Pt will be educated on importance of T/R & off loading weight while in bed & chair to prevent pressure injury  Pt will have adequate pain relief to fully participate in therapies  Pt denies pain @ present  Pt will also be educated on energy conservation as we encourage independence with ADL's  Case Management:     Discharge Planning  Living Arrangements: Lives w/ Family members  Support Systems: Son,Daughter  Assistance Needed: unable to assess  Type of Current Residence: Private residence  Current Home Care Services: No  Pt is participating in therapy  She lives in a single family home with her brother  HSe was independent prior to admission  She can live on 1 level and has no stairs to enter  Pt's cousin is currently staying with her brother whom pt cares for but this is temporary team reports  Pt's dtr is looking into 234/7 HA upon discharge  PT has grab bars, a shower chair, roller walker and a mechanical lift  Following to assist w/ d/c planning needs  Is the patient actively participating in therapies?  yes  List any modifications to the treatment plan:     Barriers Interventions   stroke Aspirin, rehab   Bonner  Possible removal   LLE weakness Strengthening, therapy exercises   Cognitive deficits Recommendation for 24/7 supervision   Knee osteoarthritis  Topical medication     Is the patient making expected progress toward goals? yes  List any update or changes to goals:     Medical Goals: Patient will be medically stable [facility] upon completion of rehab program and Patient will be able to manage medical conditions and comorbid conditions with medications and follow up upon completion of rehab program    Weekly Team Goals:   Rehab Team Goals  ADL Team Goal: Patient will require supervision with ADLs with least restrictive device upon completion of rehab program  Transfer Team Goal: Patient will require supervision with transfers with least restrictive device upon completion of rehab program  Locomotion Team Goal: Patient will require supervision with locomotion with least restrictive device upon completion of rehab program  Cognitive Team Goal: Patient will require supervision for basic and complex tasks upon completion of rehab program    Discussion: Pt presents the above barriers  24/7 supervision is already recommended at d/c  Family is working on  Brother is planning on moving in at d/c possibly  Recommendations will made as she progresses  2 week goals for supervision  Anticipated Discharge Date:  [facility] Team Members Present: The following team members are supervising care for this patient and were present during this Weekly Team Conference      Physician: Dr Melody Salcedo MD  : SANAZ Kevin  Registered Nurse: Wilian Monreal, RN, BSN, CRRN  Physical Therapist: HELENA BronsonT  Occupational Therapist: Anita Malloy MS, OTR/L and Anand Hoyos MS, OTR/L  Speech Therapist: [place], [place], [address] [place] [address]

## 2022-01-13 NOTE — ASSESSMENT & PLAN NOTE
Lab Results   Component Value Date    EGFR 28 01/12/2022    EGFR 29 01/11/2022    EGFR 32 01/10/2022    CREATININE 1 76 (H) 01/12/2022    CREATININE 1 70 (H) 01/11/2022    CREATININE 1 55 (H) 01/10/2022     Creatinine currently 1 76  Baseline appears to be 1 5-1 8  Avoid nephrotoxins  Encourage adequate hydration  Appears euvolemic  Lasix held on 1/12  Continue to trend routine BMP  Previously seen by Dr Lady Ny (Nephrology) as an outpatient

## 2022-01-13 NOTE — ASSESSMENT & PLAN NOTE
Lab Results   Component Value Date    HGBA1C 5 8 (H) 01/05/2022       Recent Labs     01/12/22  1116 01/12/22  1624 01/12/22  2040 01/13/22  0607   POCGLU 275* 119 232* 156*       Blood Sugar Average: Last 72 hrs:  (P) 187 [id number]     A1c 5 8 on 1/5  Home regimen: metformin 1000mg BID and Tradjenta 5mg daily  Currently on SSI  Consider restarting metformin as renal function improves  Continue QID accuchecks and cons  carb diet  Continue to follow-up with PCP as outpatient

## 2022-01-13 NOTE — PROGRESS NOTES
01/13/22 1230   Restrictions/Precautions   Precautions Cognitive;Bed/chair alarms; Fall Risk;Supervision on toilet/commode   Sit to Stand   Type of Assistance Needed Supervision   Sit to Stand CARE Score 4   Bed-Chair Transfer   Type of Assistance Needed Incidental touching   Comment CG with RW   Chair/Bed-to-Chair Transfer CARE Score 4   Car Transfer   Reason if not Attempted Environmental limitations   Car Transfer CARE Score 10   Walk 10 Feet   Type of Assistance Needed Physical assistance   Physical Assistance Level 25% or less   Comment min A with RW   Walk 10 Feet CARE Score 3   Walk 50 Feet with Two Turns   Type of Assistance Needed Physical assistance   Physical Assistance Level 25% or less   Comment Min A with RW   Walk 50 Feet with Two Turns CARE Score 3   Walk 150 Feet   Type of Assistance Needed Physical assistance   Physical Assistance Level Total assistance   Comment HHA with WC follow   Walk 150 Feet CARE Score 1   Walking 10 Feet on Uneven Surfaces   Type of Assistance Needed Physical assistance   Physical Assistance Level 26%-50%   Comment Min A  RW floor mat   Walking 10 Feet on Uneven Surfaces CARE Score 3   Ambulation   Does the patient walk? 2  Yes   Primary Mode of Locomotion Prior to Admission Walk   Distance Walked (feet) 150 ft  (100 HR,  150 HHA,  30x4 no device, 50x2 RW)   Assist Device Roller Walker   Gait Pattern Inconsistant Sushma; Slow Sushma;Decreased foot clearance;L knee padmini; Lateral deviation; Shuffle;Improper weight shift   Walk Assist Level Minimum Assist;Chair Follow   Findings Performed some amb with RW which pt is more steady with but pt states not using walker before so performed for NPP  some distances with HHA and HR to facilitate walking without RW-  Pt with slight lateral leaning and very slow gt ,  Did not inc antalgic with distance and as session proceeded due to L knee arthritis (slight knee give toward end of session)   Wheel 50 Feet with Two Turns   Reason if not Attempted Activity not applicable   Wheel 50 Feet with Two Turns CARE Score 9   Wheel 150 Feet   Reason if not Attempted Activity not applicable   Wheel 752 Feet CARE Score 9   4 Steps   Type of Assistance Needed Physical assistance   Physical Assistance Level 25% or less   Comment bilat HR  step to pattern   4 Steps CARE Score 3   Stairs   Type Stairs   # of Steps 8   Assist Devices Bilateral Rail   Findings Pt used step to pattern which she used in past - Due to OA in L knee most likely unable to perform NPP reciprocal pattern    Therapeutic Interventions   Neuromuscular Re-Education NPP  Program will need to be modified due to cognition, chronic L knee pain, chemo treatments and gets fatigue-  Perofrmed generalized ambulation without device and HHA as noted above    Equipment Use   NuStep 10 mins for neuro prime  SPM in low 30s  Need to encourage inc tiago next session   Assessment   Treatment Assessment 80 Min skilled PT session focused on stroke education (due to cognition unsure what pt understands)  Pt overall CG for safety with RW but needs Min A without walker  Pt will benefit from cont neuro rehab to maximize balance and safety and dynamic gait but L knee and faituge will be limiting factors  Barriers to Discharge Decreased caregiver support   PT Barriers   Physical Impairment Decreased strength;Decreased range of motion;Decreased endurance; Impaired balance;Decreased mobility; Impaired judgement;Decreased safety awareness;Pain   Plan   Progress Progressing toward goals   PT Therapy Minutes   PT Time In 1230   PT Time Out 1400   PT Total Time (minutes) 90   PT Mode of treatment - Individual (minutes) 90   PT Mode of treatment - Concurrent (minutes) 0   PT Mode of treatment - Group (minutes) 0   PT Mode of treatment - Co-treat (minutes) 0   PT Mode of Treatment - Total time(minutes) 90 minutes   PT Cumulative Minutes 180   Therapy Time missed   Time missed?  No

## 2022-01-13 NOTE — ASSESSMENT & PLAN NOTE
Continue metoprolol tartrate 25mg BID and sotalol 80mg BID for rate control  Continue Eliquis 5mg BID for anticoagulation  Monitor routine VS   Replete electrolytes as needed  Continue to follow-up with Dr Filippo Arauz as outpatient

## 2022-01-13 NOTE — ASSESSMENT & PLAN NOTE
Difficulty maintaining conversation on 1/12 - falling asleep multiple times during exam   Admits to snoring - overnight noc ox ordered for 1/12 - lowest saturation 88% for 1 minute  Does not qualify for oxygen  CT head on 1/12: "No new intracranial abnormality  Continued evolution of late subacute to chronic right basal ganglia infarct  No hemorrhage or significant mass effect "  Frequent neuro-checks  Per family, pt has been experiencing more daytime sleepiness since starting chemo

## 2022-01-13 NOTE — PLAN OF CARE
Problem: INFECTION - ADULT  Goal: Absence or prevention of progression during hospitalization  Description: INTERVENTIONS:  - Assess and monitor for signs and symptoms of infection  - Monitor lab/diagnostic results  - Monitor all insertion sites, i e  indwelling lines, tubes, and drains  - Monitor endotracheal if appropriate and nasal secretions for changes in amount and color  - [place] appropriate cooling/warming therapies per order  - Administer medications as ordered  - Instruct and encourage patient and family to use good hand hygiene technique  - Identify and instruct in appropriate isolation precautions for identified infection/condition  Outcome: Progressing     Problem: SAFETY ADULT  Goal: Patient will remain free of falls  Description: INTERVENTIONS:  - Educate patient/family on patient safety including physical limitations  - Instruct patient to call for assistance with activity   - Consult OT/PT to assist with strengthening/mobility   - Keep Call bell within reach  - Keep bed low and locked with side rails adjusted as appropriate  - Keep care items and personal belongings within reach  - Initiate and maintain comfort rounds  - Make Fall Risk Sign visible to staff  - Offer Toileting every  Hours, in advance of need  - Initiate/Maintain alarm  - Obtain necessary fall risk management equipment:   - Apply yellow socks and bracelet for high fall risk patients  - Consider moving patient to room near nurses station  Outcome: Progressing     Problem: Knowledge Deficit  Goal: Patient/family/caregiver demonstrates understanding of disease process, treatment plan, medications, and discharge instructions  Description: Complete learning assessment and assess knowledge base    Interventions:  - Provide teaching at level of understanding  - Provide teaching via preferred learning methods  Outcome: Progressing

## 2022-01-13 NOTE — PROGRESS NOTES
[facility]  Progress Note Kimberley Jarvis 1950, 66 y o  female MRN: [id number]  Unit/Bed#: -74 Encounter: [id number]  Primary Care Provider: Coty Lopez DO   Date and time admitted to hospital: 1/11/2022  3:39 PM    Lethargy  Assessment & Plan  Difficulty maintaining conversation on 1/12 - falling asleep multiple times during exam   Admits to snoring - overnight noc ox ordered for 1/12 - lowest saturation 88% for 1 minute  Does not qualify for oxygen  CT head on 1/12: "No new intracranial abnormality  Continued evolution of late subacute to chronic right basal ganglia infarct  No hemorrhage or significant mass effect "  Frequent neuro-checks  Per family, pt has been experiencing more daytime sleepiness since starting chemo  Dyslipidemia  Assessment & Plan  Started on Lipitor 80mg daily in acute setting - continue  Lipid panel on 1/5: Cholesterol 201, Triglycerides 252, HDL 41,   GERD (gastroesophageal reflux disease)  Assessment & Plan  Stable  Continue Protonix 40mg daily as substitute for non-formulary Prilosec 20mg daily  UTI (urinary tract infection)  Assessment & Plan  Received IV Rocephin and oral Keflex to complete 5 day course for >100,000cfu E coli UTI in acute setting  Course completed on 1/9  Bonner in place for urinary retention - consider discontinuing later this week  Monitor for reoccurring symptoms  Urinary retention  Assessment & Plan  Urinary retention in acute setting  Completed 5 day course of abx for E coli UTI  Bonner placed on 1/7  Consider discontinuing later this week  Atrial fibrillation (HCC)  Assessment & Plan  Continue metoprolol tartrate 25mg BID and sotalol 80mg BID for rate control  Continue Eliquis 5mg BID for anticoagulation  Monitor routine VS   Replete electrolytes as needed  Continue to follow-up with Dr Beverly Harding as outpatient  Depression  Assessment & Plan  Stable    Continue Buspar 7 5mg Q12 and Effexor 37 5mg daily  Supportive counseling as needed  Follows with Palliative as outpatient  Diabetes type 2, controlled [facility])  Assessment & Plan  Lab Results   Component Value Date    HGBA1C 5 8 (H) 01/05/2022       Recent Labs     01/12/22  1116 01/12/22  1624 01/12/22  2040 01/13/22  0607   POCGLU 275* 119 232* 156*       Blood Sugar Average: Last 72 hrs:  (P) 187 [id number]     A1c 5 8 on 1/5  Home regimen: metformin 1000mg BID and Tradjenta 5mg daily  Currently on SSI  Consider restarting metformin as renal function improves  Continue QID accuchecks and cons  carb diet  Continue to follow-up with PCP as outpatient  Hypertension  Assessment & Plan  Continue home regimen of Losartan 50mg daily and metoprolol tartrate 25mg BID  Started on amlodipine 5mg BID in acute setting - continue at this time  Started on Lasix 40mg daily in acute setting - only taking as needed at home  Currently on hold  Losartan restarted on 1/11 - decreased dose to 25mg daily on 1/12 due to lethargy/possible hypoperfusion  Monitor BP with VS   Holding parameters for SBP <130  Continue to follow-up with Dr Cathy Carr as outpatient  Chronic kidney disease, stage 3 [facility])  Assessment & Plan  Lab Results   Component Value Date    EGFR 28 01/12/2022    EGFR 29 01/11/2022    EGFR 32 01/10/2022    CREATININE 1 76 (H) 01/12/2022    CREATININE 1 70 (H) 01/11/2022    CREATININE 1 55 (H) 01/10/2022     Creatinine currently 1 76  Baseline appears to be 1 5-1 8  Avoid nephrotoxins  Encourage adequate hydration  Appears euvolemic  Lasix held on 1/12  Continue to trend routine BMP  Previously seen by Dr Racquel Chatterjee (Nephrology) as an outpatient  Lung cancer [facility])  Assessment & Plan  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)  Assessment & Plan  1/3 - admitted with AMS and multiple falls  CT head on 1/3: No acute intracranial abnormalities  MRI brain on : R basal ganglia and R caudate acute ischemia  MRI brain with contrast on : No enhancing lesions  Repeat CT head on : Evolving R basal ganglia infarct  No hemorrhage or significant mass effect  ECHO on  - EF 60% with grade 1 relaxation  EEG on  - Non-specific  Continue Lipitor 80mg daily and ASA 81mg daily  Likely related to small vessel disease  Maintain normotension  Neurovascular checks Q shift  Follow-up with Neurology as outpatient  PT/OT/Speech therapies  Primary team following  VTE Pharmacologic Prophylaxis:   Pharmacologic: Apixaban (Eliquis)  Mechanical VTE Prophylaxis in Place: Yes - sequential compression devices  Current Length of Stay: 2 day(s)    Current Patient Status: Inpatient Rehab     Discharge Plan: As per primary team     Code Status: Level 3 - DNAR and DNI    Subjective:   Pt examined while pt lying in bed in pt room  Currently has no complaints  Slept well last night but appears lethargic again during exam today  Denies any neurological changes  Per daughter, pt has been experiencing daytime sleepiness since starting chemotherapy  Pt did just complete session with OT prior to this  MOCA score was 8 this morning  VSS and BG was stable  Will re-evaluate again today  Objective:     Vitals:   Temp (24hrs), Av 9 °F (36 6 °C), Min:97 8 °F (36 6 °C), Max:98 °F (36 7 °C)    Temp:  [97 8 °F (36 6 °C)-98 °F (36 7 °C)] 98 °F (36 7 °C)  HR:  [59-75] 75  Resp:  [18] 18  BP: (113-149)/(57-70) 141/65  SpO2:  [89 %-96 %] 89 %  Body mass index is 28 34 kg/m²  Review of Systems   Constitutional: Negative for appetite change, chills, fatigue and fever  Eyes: Negative for visual disturbance  Respiratory: Negative for cough and shortness of breath      Cardiovascular: Negative for chest pain, palpitations and leg swelling  Gastrointestinal: Negative for abdominal distention, abdominal pain, constipation, diarrhea, nausea and vomiting  LBM 1/13   Genitourinary: Positive for difficulty urinating (bahena present due to urinary retention)  Negative for flank pain, hematuria and pelvic pain  Musculoskeletal: Negative for arthralgias and back pain  Neurological: Negative for dizziness, weakness, light-headedness, numbness and headaches  Psychiatric/Behavioral: Negative for sleep disturbance  Input and Output Summary (last 24 hours): Intake/Output Summary (Last 24 hours) at 1/13/2022 1137  Last data filed at 1/13/2022 0801  Gross per 24 hour   Intake 600 ml   Output 2300 ml   Net -1700 ml       Physical Exam:     Physical Exam  Vitals and nursing note reviewed  Constitutional:       Appearance: Normal appearance  HENT:      Head: Normocephalic and atraumatic  Cardiovascular:      Rate and Rhythm: Normal rate and regular rhythm  Pulses: Normal pulses  Heart sounds: Normal heart sounds  No murmur heard  No friction rub  Pulmonary:      Effort: Pulmonary effort is normal  No respiratory distress  Breath sounds: Normal breath sounds  No wheezing or rhonchi  Abdominal:      General: Abdomen is flat  Bowel sounds are normal  There is no distension  Palpations: Abdomen is soft  Tenderness: There is no abdominal tenderness  Genitourinary:     Comments: Bahena intact, draining clear, yellow urine  Musculoskeletal:      Cervical back: Normal range of motion and neck supple  Right lower leg: No edema  Left lower leg: No edema  Skin:     General: Skin is warm and dry  Capillary Refill: Capillary refill takes less than 2 seconds  Neurological:      Mental Status: She is oriented to person, place, and time  She is lethargic  Comments: Responds to verbal stimuli, needs to continuously be prompted to engage in conversation  Psychiatric:         Mood and Affect: Mood normal          Behavior: Behavior normal          Additional Data:     Labs:    Results from last 7 days   Lab Units 01/12/22  0427   WBC Thousand/uL 9 70   HEMOGLOBIN g/dL 11 6*   HEMATOCRIT % 35 4*   PLATELETS Thousands/uL 201   NEUTROS PCT % 79*   LYMPHS PCT % 11*   MONOS PCT % 9   EOS PCT % 1     Results from last 7 days   Lab Units 01/12/22  0427 01/10/22  0452 01/07/22  0542   SODIUM mmol/L 135*   < > 141   POTASSIUM mmol/L 3 8   < > 3 4*   CHLORIDE mmol/L 101   < > 105   CO2 mmol/L 31*   < > 27   BUN mg/dL 35*   < > 15   CREATININE mg/dL 1 76*   < > 1 40*   ANION GAP mmol/L 3*   < > 9   CALCIUM mg/dL 8 9   < > 8 9   ALBUMIN g/dL  --   --  2 6*   TOTAL BILIRUBIN mg/dL  --   --  0 61   ALK PHOS U/L  --   --  97   ALT U/L  --   --  23   AST U/L  --   --  28   GLUCOSE RANDOM mg/dL 164*   < > 133    < > = values in this interval not displayed           Results from last 7 days   Lab Units 01/13/22  1119 01/13/22  0607 01/12/22  2040 01/12/22  1624 01/12/22  1116 01/12/22  0609 01/11/22 2014 01/11/22  1619 01/11/22  1100 01/11/22  0730 01/10/22  2044 01/10/22  1601   POC GLUCOSE mg/dl 307* 156* 232* 119 275* 152* 173* 206* 201* 159* 206* 180*               Labs reviewed    Imaging:    Imaging reviewed    Recent Cultures (last 7 days):           Last 24 Hours Medication List:   Current Facility-Administered Medications   Medication Dose Route Frequency Provider Last Rate    acetaminophen  650 mg Oral Q6H PRN Juan Quintana MD      amLODIPine  5 mg Oral BID EDUARDO Bell      apixaban  5 mg Oral BID Juan Quintana MD      aspirin  81 mg Oral Daily Juan Quintana MD      atorvastatin  80 mg Oral Daily With Shae Stafford MD      bisacodyl  10 mg Rectal Daily PRN Juan Quintana MD      busPIRone  7 5 mg Oral BID Juan Quintana MD      cholecalciferol  2,000 Units Oral Daily Juan Quintana MD      cyanocobalamin  500 mcg Oral Daily MD James Davis Pert docusate sodium  100 mg Oral BID PRN Julian Eastman MD      folic acid  1 mg Oral Daily Julian Eastman MD      insulin lispro  1-6 Units Subcutaneous TID AC Clarence Mean, CRNP      loratadine  10 mg Oral Daily Julian Eastman MD      losartan  25 mg Oral Daily Clarence Mean, CRNP      metoprolol tartrate  25 mg Oral Q12H [address] Julian Eastman MD      ondansetron  4 mg Oral Q6H PRN Julian Eastman MD      pantoprazole  40 mg Oral Early Morning Julian Eastman MD      polyethylene glycol  17 g Oral Daily PRN Julian Eastman MD      sotalol  80 mg Oral BID Julian Eastman MD      venlafaxine  37 5 mg Oral HS Julian Eastman MD          M*Modal software was used to dictate this note  It may contain errors with dictating incorrect words or incorrect spelling  Please contact the provider directly with any questions

## 2022-01-13 NOTE — PROGRESS NOTES
01/13/22 0830   Pain Assessment   Pain Score No Pain   Restrictions/Precautions   Precautions Bed/chair alarms;Cognitive; Fall Risk;Supervision on toilet/commode   Weight Bearing Restrictions No   ROM Restrictions No   Sit to Stand   Type of Assistance Needed Incidental touching; Adaptive equipment   Physical Assistance Level No physical assistance   Comment CGA with RW, vc for hand placement   Sit to Stand CARE Score 4   Bed-Chair Transfer   Type of Assistance Needed Incidental touching   Physical Assistance Level No physical assistance   Comment CGA with RW, vc for hand placement   Chair/Bed-to-Chair Transfer CARE Score 4   Toileting Hygiene   Type of Assistance Needed Incidental touching; Adaptive equipment   Physical Assistance Level No physical assistance   Comment CGA in stance for buttock hygiene  Pt perseverating she needs to void, with cont educ that she has bahena placed   [address] Score 4   Toileting   Able to [address] down yes, up yes  Able to Manage Clothing Closures Yes   Manage Hygiene Bowel   Limitations Noted In Balance;ROM;Safety;UE Strength;LE Strength   Adaptive Equipment Grab Bar   Toilet Transfer   Type of Assistance Needed Incidental touching; Adaptive equipment   Physical Assistance Level No physical assistance   Comment CGA with RW, vc for hand placement   Toilet Transfer CARE Score 4   Toilet Transfer   Surface Assessed Standard Commode   Transfer Technique Standard   Limitations Noted In Balance; Endurance;Problem Solving; Safety;UE Strength;LE Strength   Adaptive Equipment Grab Bar   Positioning Concerns LE Support;Cognition; Safety   Functional Standing Tolerance   Time [id number]; [id number]   Activity static standing   Comments engaged pt in functional standing balance to inc fx standing to inc particiapiton in ADL tasks   tolerated well with rest break in between   Therapeutic Excerise-Strength   UE Strength Yes   Right Upper Extremity- Strength   R Shoulder Flexion; Extension;Horizontal ABduction   R Elbow Elbow flexion;Elbow extension   R Position Seated   Equipment Dumbbell  (3#)   R Weight/Reps/Sets 2x15   RUE Strength Comment engaged pt in UE TE with 3# 2x15 to inc fx strength for improved STS  toelrated well with rest break  req vc and visual demosntration 2* to dec cog/attention   Left Upper Extremity-Strength   L Shoulder Flexion; Extension;Horizontal ABduction   L Elbow Elbow flexion;Elbow extension   L Position Seated   Equipment Dumbell   L Weights/Reps/Sets 2x15   LUE Strength Comment see above   Cognition   Overall Cognitive Status Impaired   Arousal/Participation Alert; Cooperative   Attention Within functional limits   Orientation Level Disoriented to time;Disoriented to situation   Memory Decreased recall of recent events   Following Commands Follows one step commands with increased time or repetition   Comments Administered MOCA 8 1 pt scoring 8/30 placing her at severe cog deficits  Recommend 24/7 supervision at [place] for safety  See below for further details   Activity Tolerance   Activity Tolerance Patient limited by fatigue   Assessment   Treatment Assessment Engaged pt in 80mins of skilled OT services with focus on MOCA administration, brief CVA educ, UE TE, toielting and standing tolerance  Pt scored 8/30 on MOCA placing her at severe cog deficits  Throughout test pt perseverating esepcial on visuspatial req vc to terminate  Req vc to attend to task and at times closing eyes  At this time recommend 24/7 supervision at [place] 2* to dec cognition, safety, insight to deficits in order to ensure safety  Pt reporting she feels like she can go home and take care of brother  Therapist completed very brief CVA educ on purspose of rehab  However, remaining CVA educ not approrpaite at this time 2* to dec cognition, attention, memory, new learning  Therapist and Dr Hebert Mckeon to call   today to disucss recommendaitons   Cont OT POC with foucs on FT, activity toelrance, balance, UE TE, safety to inc fx performance and dec caregiver burden  Prognosis Fair   Problem List Decreased strength;Decreased endurance; Impaired balance;Decreased mobility; Decreased cognition;Decreased safety awareness; Obesity   Barriers to Discharge Inaccessible home environment;Decreased caregiver support  (24/7 supervision)   Barriers to Discharge Comments dec cognition insight to deficits   Plan   Treatment/Interventions ADL retraining;Functional transfer training; Therapeutic exercise; Endurance training;Patient/family training;Bed mobility; Compensatory technique education   Progress Progressing toward goals   Recommendation   OT Discharge Recommendation Home with home health rehabilitation  (24/7 supervision/assistance)   OT - OK to Discharge No   OT Therapy Minutes   OT Time In 0830   OT Time Out 0950   OT Total Time (minutes) 80   OT Mode of treatment - Individual (minutes) 80   OT Mode of treatment - Concurrent (minutes) 0   OT Mode of treatment - Group (minutes) 0   OT Mode of treatment - Co-treat (minutes) 0   OT Mode of Treatment - Total time(minutes) 80 minutes   OT Cumulative Minutes 170   Therapy Time missed   Time missed? No     Pt completed Jamar Cognitive Assessment (MOCA) version 8 1  Pt scored overall 8 / 30  indicating a severe cognitive deficit  Visuospatial/executive: 1/5  (perseverating on drawing/lines and req vc to terminate)  Naming: 3/3   Memory:    (worth no points)   Attention:  1/ 6 (#s that needed to be repeated backwards, pt repeated forward; with tapping did not tap at any and was closing eyes  When asked to pay attention would repeat previous task  When subtracting pt reporting "7 from 100 is 3" Unable to complete after that  Language: 1 / 3 (only able to repeat first sentence, added "the" to 2nd sentence)  Abstraction: 0 / 2 (able to verbalize fruits, but not other abstractions)  Delayed recall: 0 / 5 (MIS= 2/15   Unable to recall with no cue and category cue) Orientation: 2 / 6 (oriented only to place and city)      Total score 8/30, indicating a severe cognitive deficit  2* to dec cognitive deficits therapist recommend pt to have 24/7 supervision at [place] to ensure safety  Therapist to contact dtr with Dr Belvin Leyden later today to discuss recommendations and family's ability to provide 24/7 at [facility] OT goals downgraded to S from IE to ensure safety

## 2022-01-13 NOTE — ASSESSMENT & PLAN NOTE
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

## 2022-01-13 NOTE — PROGRESS NOTES
01/13/22 1430   Pain Assessment   Pain Assessment Tool 0-10   Pain Score No Pain   Restrictions/Precautions   Precautions Bed/chair alarms;Cognitive; Fall Risk; Bonner; Supervision on toilet/commode   Comprehension   Comprehension (FIM) 2 - Understands only simple expressions or gestures (waves, hello)   Expression   Expression (FIM) 3 - Expresses basic info/needs 50-74% of time   Social Interaction   Social Interaction (FIM) 2 - Needs frequent redirection   Problem Solving   Problem solving (FIM) 2 - Needs direction more than ½ time to initiate, plan or complete simple tasks   Memory   Memory (FIM) 2 - Recalls 1 of 2 steps   Speech/Language/Cognition Assessmetn   Treatment Assessment Upon entering room, pt was in [facility] 23 but preoccupied w/ cell phone  SLP reintroducing self to pt, but overall attention and focus remained on cell phone  When pulling up a chair and attempting to engage pt, it was noted that pt had the home screen on phone which had the time/date  SLP asking pt what she was trying to look at, pt reported needing to find the phone numbers give 2 different nurses who work for Dr Homar Cadet office  Further probing questions continued from SLP to which pt was looking for their numbers so she can call them to set up more treatment  However when pt was attempting to get 'into" her phone, pt's dtr called and pt asking dtr about reaching out to the offices, etc  Additionally, pt reported to SLP that pt's dtr and granddtr were tested positive for covid also  After hanging up w/ dtr, pt was able to remain in main area of phone to get to different items  Pt was on the [employer] hannah, to which SLP highlighted this but pt's overall comprehension was POOR unable to stop scrolling, thinking she was finding the number to call the nurse  SLP had to physically stop pt from scrolling, show her the page and look to bottom to locate the home button   Pt then able to follow basic verbal directions to locate the contacts area, but when again further probing for last names, etc, pt stated, "I don't have their numbers but I'm sure I can find them " Further stating, "well my dtr will have the number " Provided pt reminder of just speaking to dtr no more than 5 minutes prior to which pt had NO recall of this  SLP attempted to engage pt in a structure task to which pt brushed aside and continued to fixate on finding this phone number  Pt then stating that she had to use the bathroom, to which SLP noticed that the bahena catheter was still placed  Pt almost argumentative w/ SLP in regards to getting up to go to the bathroom to urinate, to which SLP showed pt the catheter  Still remained perseverative and wanting to get up to go to the bathroom  SLP did find nsg quick to ask about toileting habits to which nsg did state that pt might need to have a bowel movement  During this time when pt toileting, SLP notifed, MD, Dr Judyth Boast in regards to increased confusion, difficulty in redirecting to tasks, etc, to which MD came into session to observe  After toileting was completed, pt remained on phone, still attempting to look for the phone number  MD was able to redirect pt, to which reviewed orientation, which pt verbalizing correct facility but not city and reasoning for hospitalization as "falls" needing increased cues for stroke  Quickly engaged pt in divergent naming tasks, given basic categories (colors, beverages), where pt was 7/10 accurate in naming items independently in categories, noting difficulty w/ beverages  Pt also names DEEPAK but when asked to reverse the DEEPAK, but w/ increased difficulty in completing  At this time, pt will continue to benefit from ongoing skilled SLP services to maximize functional cognitive skills to decreased caregiver burden at discharge      SLP Therapy Minutes   SLP Time In 1430   SLP Time Out 1500   SLP Total Time (minutes) 30   SLP Mode of treatment - Individual (minutes) 30   SLP Mode of treatment - Concurrent (minutes) 0   SLP Mode of treatment - Group (minutes) 0   SLP Mode of treatment - Co-treat (minutes) 0   SLP Mode of Treatment - Total time(minutes) 30 minutes   SLP Cumulative Minutes 80   Therapy Time missed   Time missed?  No

## 2022-01-14 LAB
ANION GAP SERPL CALCULATED.3IONS-SCNC: 3 MMOL/L (ref 5–14)
BUN SERPL-MCNC: 38 MG/DL (ref 5–25)
CALCIUM SERPL-MCNC: 8.2 MG/DL (ref 8.4–10.2)
CHLORIDE SERPL-SCNC: 103 MMOL/L (ref 97–108)
CO2 SERPL-SCNC: 31 MMOL/L (ref 22–30)
CREAT SERPL-MCNC: 1.75 MG/DL (ref 0.6–1.2)
GFR SERPL CREATININE-BSD FRML MDRD: 28 ML/MIN/1.73SQ M
GLUCOSE P FAST SERPL-MCNC: 131 MG/DL (ref 70–99)
GLUCOSE SERPL-MCNC: 131 MG/DL (ref 70–99)
GLUCOSE SERPL-MCNC: 132 MG/DL (ref 65–140)
GLUCOSE SERPL-MCNC: 134 MG/DL (ref 65–140)
GLUCOSE SERPL-MCNC: 171 MG/DL (ref 65–140)
GLUCOSE SERPL-MCNC: 301 MG/DL (ref 65–140)
POTASSIUM SERPL-SCNC: 3.8 MMOL/L (ref 3.6–5)
SODIUM SERPL-SCNC: 137 MMOL/L (ref 137–147)

## 2022-01-14 PROCEDURE — 97530 THERAPEUTIC ACTIVITIES: CPT

## 2022-01-14 PROCEDURE — 97112 NEUROMUSCULAR REEDUCATION: CPT

## 2022-01-14 PROCEDURE — 97116 GAIT TRAINING THERAPY: CPT

## 2022-01-14 PROCEDURE — 97129 THER IVNTJ 1ST 15 MIN: CPT

## 2022-01-14 PROCEDURE — 82948 REAGENT STRIP/BLOOD GLUCOSE: CPT

## 2022-01-14 PROCEDURE — 99232 SBSQ HOSP IP/OBS MODERATE 35: CPT | Performed by: INTERNAL MEDICINE

## 2022-01-14 PROCEDURE — 97130 THER IVNTJ EA ADDL 15 MIN: CPT

## 2022-01-14 PROCEDURE — 99233 SBSQ HOSP IP/OBS HIGH 50: CPT | Performed by: PHYSICAL MEDICINE & REHABILITATION

## 2022-01-14 PROCEDURE — 97535 SELF CARE MNGMENT TRAINING: CPT

## 2022-01-14 PROCEDURE — 80048 BASIC METABOLIC PNL TOTAL CA: CPT | Performed by: NURSE PRACTITIONER

## 2022-01-14 RX ORDER — AMLODIPINE BESYLATE 5 MG/1
5 TABLET ORAL
Status: DISCONTINUED | OUTPATIENT
Start: 2022-01-14 | End: 2022-01-18

## 2022-01-14 RX ADMIN — BUSPIRONE HYDROCHLORIDE 7.5 MG: 15 TABLET ORAL at 09:03

## 2022-01-14 RX ADMIN — CYANOCOBALAMIN TAB 1000 MCG 500 MCG: 1000 TAB at 09:03

## 2022-01-14 RX ADMIN — FOLIC ACID 1 MG: 1 TABLET ORAL at 09:03

## 2022-01-14 RX ADMIN — VENLAFAXINE HYDROCHLORIDE 37.5 MG: 37.5 CAPSULE, EXTENDED RELEASE ORAL at 21:08

## 2022-01-14 RX ADMIN — LORATADINE 10 MG: 10 TABLET ORAL at 09:03

## 2022-01-14 RX ADMIN — DICLOFENAC SODIUM 2 G: 10 GEL TOPICAL at 21:08

## 2022-01-14 RX ADMIN — SOTALOL HYDROCHLORIDE 80 MG: 80 TABLET ORAL at 09:03

## 2022-01-14 RX ADMIN — ATORVASTATIN CALCIUM 80 MG: 80 TABLET, FILM COATED ORAL at 17:27

## 2022-01-14 RX ADMIN — APIXABAN 5 MG: 5 TABLET, FILM COATED ORAL at 17:27

## 2022-01-14 RX ADMIN — INSULIN LISPRO 4 UNITS: 100 INJECTION, SOLUTION INTRAVENOUS; SUBCUTANEOUS at 12:05

## 2022-01-14 RX ADMIN — METOPROLOL TARTRATE 25 MG: 25 TABLET, FILM COATED ORAL at 21:07

## 2022-01-14 RX ADMIN — BUSPIRONE HYDROCHLORIDE 7.5 MG: 15 TABLET ORAL at 21:06

## 2022-01-14 RX ADMIN — LOSARTAN POTASSIUM 25 MG: 25 TABLET, FILM COATED ORAL at 09:03

## 2022-01-14 RX ADMIN — APIXABAN 5 MG: 5 TABLET, FILM COATED ORAL at 09:03

## 2022-01-14 RX ADMIN — ACETAMINOPHEN 650 MG: 325 TABLET ORAL at 13:45

## 2022-01-14 RX ADMIN — ASPIRIN 81 MG: 81 TABLET, COATED ORAL at 09:03

## 2022-01-14 RX ADMIN — METOPROLOL TARTRATE 25 MG: 25 TABLET, FILM COATED ORAL at 09:03

## 2022-01-14 RX ADMIN — Medication 2000 UNITS: at 09:03

## 2022-01-14 RX ADMIN — PANTOPRAZOLE SODIUM 40 MG: 40 TABLET, DELAYED RELEASE ORAL at 06:05

## 2022-01-14 RX ADMIN — DICLOFENAC SODIUM 2 G: 10 GEL TOPICAL at 17:27

## 2022-01-14 RX ADMIN — AMLODIPINE BESYLATE 5 MG: 5 TABLET ORAL at 21:08

## 2022-01-14 NOTE — ASSESSMENT & PLAN NOTE
Lab Results   Component Value Date    EGFR 28 01/14/2022    EGFR 28 01/12/2022    EGFR 29 01/11/2022    CREATININE 1 75 (H) 01/14/2022    CREATININE 1 76 (H) 01/12/2022    CREATININE 1 70 (H) 01/11/2022     Creatinine currently 1 75  Baseline appears to be 1 5-1 8  Avoid nephrotoxins  Encourage adequate hydration  Appears euvolemic  Lasix on hold  Continue to trend routine BMP  Previously seen by Dr Joe Dhillon (Nephrology) as an outpatient

## 2022-01-14 NOTE — ASSESSMENT & PLAN NOTE
Continue home regimen of Losartan 50mg daily and metoprolol tartrate 25mg BID  Started on amlodipine 5mg BID in acute setting - continue at this time  Started on Lasix 40mg daily in acute setting - only taking as needed at home  Currently on hold  Losartan restarted on 1/11 - decreased dose to 25mg daily on 1/12 due to lethargy/possible hypoperfusion  Decreased amlodipine to 5mg at HS on 1/14  Monitor BP with VS   Holding parameters for SBP <130  Continue to follow-up with Dr Molina Found as outpatient

## 2022-01-14 NOTE — PLAN OF CARE
Problem: SAFETY ADULT  Goal: Patient will remain free of falls  Description: INTERVENTIONS:  - Educate patient/family on patient safety including physical limitations  - Instruct patient to call for assistance with activity   - Consult OT/PT to assist with strengthening/mobility   - Keep Call bell within reach  - Keep bed low and locked with side rails adjusted as appropriate  - Keep care items and personal belongings within reach  - Initiate and maintain comfort rounds  - Make Fall Risk Sign visible to staff  - Offer Toileting every 4 Hours, in advance of need  - Initiate/Maintain alarm- Apply yellow socks and bracelet for high fall risk patients  - Consider moving patient to room near nurses station  Outcome: Progressing  Goal: Maintain or return to baseline ADL function  Description: INTERVENTIONS:  -  Assess patient's ability to carry out ADLs; assess patient's baseline for ADL function and identify physical deficits which impact ability to perform ADLs (bathing, care of mouth/teeth, toileting, grooming, dressing, etc )  - Assess/evaluate cause of self-care deficits   - Assess range of motion  - Assess patient's mobility; develop plan if impaired  - Assess patient's need for assistive devices and provide as appropriate  - Encourage maximum independence but intervene and supervise when necessary  - Involve family in performance of ADLs  - Assess for home care needs following discharge   - Consider OT consult to assist with ADL evaluation and planning for discharge  - Provide patient education as appropriate  Outcome: Progressing  Goal: Maintains/Returns to pre admission functional level  Description: INTERVENTIONS:  - Perform BMAT or MOVE assessment daily    - Set and communicate daily mobility goal to care team and patient/family/caregiver  - Collaborate with rehabilitation services on mobility goals if consulted  -    - Reposition patient every 4 hours    -     -   - Out of bed to chair 3 times a day   - Out of bed for meals 3times a day  - Out of bed for toileting  - Record patient progress and toleration of activity level   Outcome: Progressing

## 2022-01-14 NOTE — ASSESSMENT & PLAN NOTE
Urinary retention in acute setting  Completed 5 day course of abx for E coli UTI  Bonner placed on 1/7  Discontinued on 1/14 - urinary retention protocol

## 2022-01-14 NOTE — PLAN OF CARE
Problem: SAFETY ADULT  Goal: Patient will remain free of falls  Description: INTERVENTIONS:  - Educate patient/family on patient safety including physical limitations  - Instruct patient to call for assistance with activity   - Consult OT/PT to assist with strengthening/mobility   - Keep Call bell within reach  - Keep bed low and locked with side rails adjusted as appropriate  - Keep care items and personal belongings within reach  - Initiate and maintain comfort rounds  - Make Fall Risk Sign visible to staff  - Offer Toileting every 2 Hours, in advance of need  - Initiate/Maintain bed/chair alarm  - Obtain necessary fall risk management equipment: RW  - Apply yellow socks and bracelet for high fall risk patients  - Consider moving patient to room near nurses station  Outcome: Progressing

## 2022-01-14 NOTE — NURSING NOTE
Pt's bahena catheter removed today around 06:30 am  Pt tolerated the procedure, no c/o pain  Will monitor

## 2022-01-14 NOTE — ASSESSMENT & PLAN NOTE
Continue metoprolol tartrate 25mg BID and sotalol 80mg BID for rate control  Continue Eliquis 5mg BID for anticoagulation  Monitor routine VS   Replete electrolytes as needed  Continue to follow-up with Dr Naila Barros as outpatient

## 2022-01-14 NOTE — PROGRESS NOTES
PM&R PROGRESS NOTE:  Tiffanie De La Rosa 66 y o  female MRN: [id number]  Unit/Bed#: -01 Encounter: [id number]        Rehab Diagnosis: Impairment of mobility, safety, Activities of Daily Living (ADLs), and cognitive/communication skills due to Stroke:  01 1  Left Body Involvement (Right Brain)  Right basal ganglia CVA     History of Present Illness:   Tiffanie De La Rosa is a 66 y o  female with known stage 4 juan cancer undergoing chemotherapy every 3 weeks, A fib on Eliquis, HTN, HLD, CHF (grade 2 DD), GERD, CKD3, DM2, bilateral knee osteoarthritis, depression/anxiety who presented to the [facility] on 1/3/22 due to 4 falls and leg contusion  Patient found to have a right buttock hematoma which remained stable  Patient developed altered mental status  CTH negative  MRI brain ultimately revealed a acute right basal ganglia ischemic CVA  MRA/MRV negative for occlusive disease  MRI brain with contrast negative for any enhancing lesions  Patient seen by Neurology and determined etiology of CVA small vessel disease  Aspirin was added to her secondary CVA ppx  Medically, treated for UTI with Ceftriaxone and Keflex and has completed treatment  Also required a Bonner placement on 1/8/22 for urinary retention,  Patient had diarrhea transiently which improved  Patient's BP medications were adjusted to allow for perimissive HTN post CVA  After medical stabilization, patient was found to have acute functional deficits from her baseline in mobility, self care, and cognition and was admitted to [facility] for acute inpatient rehabilitation  SUBJECTIVE:  Patient seen face to face  Eating well today  States she has no major complaints  She is able to tell me why she is hear and understand she had a stroke  Left leg static strength is good however dynamic weakness when walking increased with fatigue  Provided full update to her daughter on 1/13/22       ASSESSMENT: Stable, progressing      PLAN:    Rehabilitation  · Functional deficits: left hemiparesis, bilateral proximal musculature weakness, impaired proprioception/sensation in feet, mild left dysmetria, impaired balance, impaired mobility, self care, impaired cognition   Continue current rehabilitation plan of care to maximize function   Estimated Discharge: TBD, RETEAM ; probable 10-14 days ELOS with Supervision goals    Physical Therapy Occupational Therapy Speech Therapy   Weight Bearing Status: Full Weight Bearing  Transfers: Minimal Assistance  Bed Mobility: Minimal Assistance  Amulation Distance (ft): 90 feet  Ambulation: Minimal Assistance  Assistive Device for Ambulation: Roller Walker  Number of Stairs: 4  Assistive Device for Stairs: Bilateral Hand Rails  Stair Assistance: Minimal Assistance  Ramp:  (TBA)  Discharge Recommendations: Home with:  [address] with[de-identified] Home Physical Therapy   Eating:  (setup)  Grooming: Supervision  Bathing: Minimal Assistance  Bathing: Minimal Assistance  Upper Body Dressing: Supervision  Lower Body Dressing: Moderate Assistance  Toileting: Minimal Assistance  Tub/Shower Transfer: Moderate Assistance  Toilet Transfer: Minimal Assistance  Cognition: Exceptions to WNL  Cognition: Decreased Memory,Decreased Executive Functions,Decreased Safety  Orientation: Person,Place   Mode of Communication: Verbal  Cognition: Exceptions to WNL  Cognition: Decreased Memory,Decreased Executive Functions,Decreased Comprehension,Decreased Safety,Decreased Attention  Orientation: Person,Place,Situation  Discharge Recommendations: Home with:  [address] with[de-identified] Family Support,24 Hour Supervision,24 Hour Assisteance,Home Speech Therapy,Outpatient Speech Therapy (pending pt progress)         Pain   No major complaints     Tylenol available if needed    DVT prophylaxis   Managed on Eliquis     Bladder plan   TOV today    Bowel plan   Continent      * Acute ischemic stroke [facility])  Assessment & Plan  Presented with 4 falls and mental status change  MRI brain confirmed a right basal ganglia and right caudate ischemic CVA  Etiology: Small vessel disease  Secondary CVA ppx: managed on Eliquis 5mg BID, Lipitor 80mg daily, and now aspirin 81mg daily  Continue CVA education while at [address] [place] [address] education and reduction of modifiable risk factors  Monitor mood  Follow-up with Neurology as outpatient     Neuropathy due to chemotherapeutic drug [facility])  Assessment & Plan  Not painful  Consider topical lidoderm patch or gabapentin if pain develops    Chemotherapy-induced thrombocytopenia  Assessment & Plan  Plts = 167K > 201K   Improving  Continue to monitor    GERD (gastroesophageal reflux disease)  Assessment & Plan  Managed on Protonix (therapeutic exchange for Prilosec)    Diastolic congestive heart failure (HCC)  Assessment & Plan  Grade I DD on echocardiogram  Lasix on HOLD due to ADITYA  Patient euvolemic   IM consultants following and managing   Follows with Dr Eden Ho    UTI (urinary tract infection)  Assessment & Plan  Resolved  Diagnosed in acute care  Urine culture + E  Coli   Treated with 5 days antibiotics Ceftriaxone and Keflex  Also was on oral vancomycin to prevent recurrent C   Diff  Asymptomatic     Urinary retention  Assessment & Plan  Likely related to UTI  Bonner catheter inserted 1/8/22  Bonner catheter removed today 1/14/22  TOV underway on urinary retention protocol    Ground glass opacity present on imaging of lung  Assessment & Plan  Found incidentally on CT Chest  COVID - 19 negative   PCP to follow as outpatient     Atrial fibrillation ([facility] [address] )  Assessment & Plan  Rate/rhythm controlled on Lopressor 25mg Q12hrs and Sotolol 80mg BID  Anticoagulated on Eliquis 5mg BID  Stable  IM consultants following while at [place] [address] with Dr Fabrice Bradley on home regimen of Buspar 7 5mg BID and Effexor XR 37 5 QHS  Mood is stable  Consult neuropsychology if needed    Diabetes type 2, controlled [facility])  Assessment & Plan  Lab Results   Component Value Date    HGBA1C 5 8 (H) 01/05/2022     Managed here on SSI  At home was on Metformin and Tradjenta  Continue CCD  Nutrition consulted   IM consultants following and managing     Hypertension  Assessment & Plan  Managed here on Norvasc 5mg BID, Lopressor 25mg Q12h, Losartan 25mg daily  Lasix on HOLD  At home on same regimen as above  BP stable   Follows with Dr Akin Hurst    Chronic kidney disease, stage 3 ([address] )  Assessment & Plan  ADITYA on CKD detail I  Baseline creatinine 1 5-1 8  Cr = 1 75  Lasix on HOLD as patient is euvolemic  Losartan reduced from 50mg daily to 25mg daily by IM consultants    Lung cancer [facility])  Assessment & Plan  Known stage 4 adenocarcinoma of lung   · Follows with Dr Jossy Cannon  · [facility] Chemotherapy every 3 weeks  Last infusion was 12/27/21  Missed her 1/7/22 infusion due to hospitalization  · Follow-up with Dr Jossy Cannon as outpatient   CT Chest: Scattered groundglass opacities in both lungs which are new from prior exam   Consider infectious or inflammatory etiologies including Covid viral pneumonia  Previous right lower lobe lung mass has diminished since July  Previous left upper lobe tumor has also decreased in size but appears more atelectatic  Increasing water attenuation pleural fluid, partially loculated pleural fluid in the left lower lung  Follow-up per cancer imaging protocol  Contusion of left lower leg  Assessment & Plan  Sustained during fall  X-ray of right ankle is negative for fracture, however showing linear calcifications associated with the plantar fascia and Achilles tendon, these are chronic  Buttocks Hematoma  Assessment & Plan  Sustained during fall  CT showing Suspected small post traumatic hematoma measuring 2 cm in the soft tissues of the right buttock/perianal region  No active bleeding  Stable H&H  No overt pain complaints         Appreciate IM consultants medical co-management    Labs, medications, and imaging personally reviewed  ROS:  A ten point review of systems was completed on 01/14/22 and pertinent positives are listed in subjective section  All other systems reviewed were negative  OBJECTIVE:   /79   Pulse 75   Temp (!) 97 °F (36 1 °C) (Tympanic)   Resp 18   Ht 5' 4" (1 626 m)   Wt 74 9 kg (165 lb 2 oz)   SpO2 96%   BMI 28 34 kg/m²     Physical Exam  Vitals and nursing note reviewed  Constitutional:       General: She is not in acute distress  HENT:      Head: Normocephalic and atraumatic  Nose: Nose normal       Mouth/Throat:      Mouth: Mucous membranes are moist    Eyes:      Conjunctiva/sclera: Conjunctivae normal    Cardiovascular:      Rate and Rhythm: Normal rate and regular rhythm  Pulses: Normal pulses  Pulmonary:      Effort: Pulmonary effort is normal       Breath sounds: Normal breath sounds  No wheezing or rales  Abdominal:      General: Bowel sounds are normal  There is no distension  Palpations: Abdomen is soft  Tenderness: There is no abdominal tenderness  Musculoskeletal:         General: No swelling  Cervical back: Neck supple  Skin:     General: Skin is warm  Neurological:      Mental Status: She is alert  Motor: Weakness (Left lower extremity) present  Gait: Gait abnormal ( fatigues easily)        Comments: Oriented to person place and situation  Disoriented to time   Psychiatric:         Mood and Affect: Mood normal           Lab Results   Component Value Date    WBC 9 70 01/12/2022    HGB 11 6 (L) 01/12/2022    HCT 35 4 (L) 01/12/2022    MCV 87 01/12/2022     01/12/2022     Lab Results   Component Value Date    SODIUM 137 01/14/2022    K 3 8 01/14/2022     01/14/2022    CO2 31 (H) 01/14/2022    BUN 38 (H) 01/14/2022    CREATININE 1 75 (H) 01/14/2022    GLUC 131 (H) 01/14/2022    CALCIUM 8 2 (L) 01/14/2022     Lab Results   Component Value Date    INR 1 13 01/03/2022    PROTIME 14 5 01/03/2022           Current Facility-Administered Medications:     acetaminophen (TYLENOL) tablet 650 mg, 650 mg, Oral, Q6H PRN, Ousmane Dubois MD    amLODIPine (NORVASC) tablet 5 mg, 5 mg, Oral, HS, EDUARDO Knutson    apixaban (ELIQUIS) tablet 5 mg, 5 mg, Oral, BID, Ousmane Dubois MD, 5 mg at 01/14/22 0903    aspirin (ECOTRIN LOW STRENGTH) EC tablet 81 mg, 81 mg, Oral, Daily, Ousmane Dubois MD, 81 mg at 01/14/22 0903    atorvastatin (LIPITOR) tablet 80 mg, 80 mg, Oral, Daily With Abdoul Ocasio MD, 80 mg at 01/13/22 1716    bisacodyl (DULCOLAX) rectal suppository 10 mg, 10 mg, Rectal, Daily PRN, Ousmane Dubois MD    busPIRone (BUSPAR) tablet 7 5 mg, 7 5 mg, Oral, BID, Ousmane Dubois MD, 7 5 mg at 01/14/22 4437    cholecalciferol (VITAMIN D3) tablet 2,000 Units, 2,000 Units, Oral, Daily, Ousmane Dubois MD, 2,000 Units at 01/14/22 0903    cyanocobalamin (VITAMIN B-12) tablet 500 mcg, 500 mcg, Oral, Daily, Ousmane Dubois MD, 500 mcg at 01/14/22 3343    docusate sodium (COLACE) capsule 100 mg, 100 mg, Oral, BID PRN, Ousmane Dubois MD    folic acid (FOLVITE) tablet 1 mg, 1 mg, Oral, Daily, Ousmane Dubois MD, 1 mg at 01/14/22 0903    insulin lispro (HumaLOG) 100 units/mL subcutaneous injection 1-6 Units, 1-6 Units, Subcutaneous, TID AC, 4 Units at 01/14/22 1205 **AND** Fingerstick Glucose (POCT), , , TID AC, EDUARDO Knutson    loratadine (CLARITIN) tablet 10 mg, 10 mg, Oral, Daily, Ousmane Dubois MD, 10 mg at 01/14/22 1324    losartan (COZAAR) tablet 25 mg, 25 mg, Oral, Daily, EDUARDO Knutson, 25 mg at 01/14/22 0903    metoprolol tartrate (LOPRESSOR) tablet 25 mg, 25 mg, Oral, Q12H [facility] HOME, Ousmane Dubois MD, 25 mg at 01/14/22 0903    ondansetron (ZOFRAN-ODT) dispersible tablet 4 mg, 4 mg, Oral, Q6H PRN, Ousmane Dubois MD    pantoprazole (PROTONIX) EC tablet 40 mg, 40 mg, Oral, Early Morning, Ousmane Dubois MD, 40 mg at 01/14/22 0605    polyethylene glycol (MIRALAX) packet 17 g, 17 g, Oral, Daily PRN, Leonardo Washington MD    sotalol (BETAPACE) tablet 80 mg, 80 mg, Oral, BID, Leonardo Washington MD, 80 mg at 01/14/22 0903    venlafaxine (EFFEXOR-XR) 24 hr capsule 37 5 mg, 37 5 mg, Oral, HS, Leonardo Washington MD, 37 5 mg at 01/13/22 2116    History reviewed  No pertinent past medical history  Patient Active Problem List    Diagnosis Date Noted    Acute ischemic stroke ([place] [address] ) 01/05/2022    Dyslipidemia 01/12/2022    Lethargy 01/12/2022    UTI (urinary tract infection) 89/48/5898    Diastolic congestive heart failure ([place] [address] ) 01/11/2022    GERD (gastroesophageal reflux disease) 01/11/2022    Chemotherapy-induced thrombocytopenia 01/11/2022    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[place] [address] ) 01/11/2022    Encephalopathy 01/06/2022    Urinary retention 01/06/2022    Confusion     Hypomagnesemia 01/04/2022    Ground glass opacity present on imaging of lung 01/04/2022    Buttocks Hematoma 01/03/2022    Contusion of left lower leg 01/03/2022    Lung cancer ([place] [address] ) 01/03/2022    Chronic kidney disease, stage 3 ([place] [address] ) 01/03/2022    Hypertension 01/03/2022    Diabetes type 2, controlled ([place] [address] ) 01/03/2022    Depression 01/03/2022    Hypokalemia 01/03/2022    Atrial fibrillation ([place] [address] ) 01/03/2022          Leonardo Washington MD    Total time spent:  30 minutes with more than 50% spent counseling/coordinating care  Counseling includes discussion with patient re: progress and discussion with patient of his/her current medical/functional state/information  Coordination of patient's care was performed in conjunction with consulting services  Time invested included review of patient's labs, vitals, and management of their comorbidities with continued monitoring  The care of the patient was extensively discussed and appropriate treatment plan was formulated unique for this patient  ** Please Note:  voice to text software may have been used in the creation of this document   Although proof errors in transcription or interpretation are a potential of such software**

## 2022-01-14 NOTE — PROGRESS NOTES
01/14/22 1330   Pain Assessment   Pain Assessment Tool 0-10   Pain Score 5   Pain Location/Orientation Orientation: Left; Location: Knee   Restrictions/Precautions   Precautions Bed/chair alarms;Cognitive; Fall Risk   Sit to Stand   Type of Assistance Needed Supervision   Comment RW   Sit to Stand CARE Score 4   Bed-Chair Transfer   Type of Assistance Needed Supervision   Comment RW   Chair/Bed-to-Chair Transfer CARE Score 4   Walk 10 Feet   Type of Assistance Needed Supervision   Comment RW   Walk 10 Feet CARE Score 4   Walk 50 Feet with Two Turns   Type of Assistance Needed Supervision   Comment RW   Walk 50 Feet with Two Turns CARE Score 4   Ambulation   Does the patient walk? 2  Yes   Primary Mode of Locomotion Prior to Admission Walk   Distance Walked (feet) 100 ft  (30)   Assist Device Roller Walker   Gait Pattern Decreased foot clearance; Antalgic; Slow Sushma   Walk Assist Level Close Supervision   Findings Pt started session was going to walk down to gym but pt reports "i need to sit because of my L knee" so had pt sit in [facility] - requested tylenol from NSG,  Pt during session performed multiple laps at HR for amb and balance - used RW to amb back to room pt was S level   4 Steps   Comment did not challenge due to inc knee pain today   Therapeutic Interventions   Neuromuscular Re-Education in hallway amb 100feet x3 unilateral support with soft touch on rail,  30'x2 with SPC  (due to fatigue pt needs significant rest breaks,  std ball toss and catch and bouce,  then performed on airex mat (CG for safety needed),    Equipment Use   NuStep 10 mins for neuro prime SPM in upper 30s low 40s   Assessment   Treatment Assessment 90 min session focused on gait training with modified NPP and balance  Pt does well on Nustep and was able to inc SPM today showing some improvement  Pt today needed to abruptly sit due to knee pain in begining of session but got tylenol and then states had less pain    Performed multiple bouts with hand rail to challenge balance but due to knee pain pt may need walker at time of D/C incase of L knee buckle  Pts rehab program is limited due to knee pain and cannot perform usualy NPP  Pt however did appear steadier at end of session walking back to room using RW  Barriers to Discharge Decreased caregiver support   PT Barriers   Physical Impairment Decreased strength;Decreased range of motion;Decreased endurance; Impaired balance;Decreased mobility; Impaired judgement;Decreased safety awareness;Pain   Plan   Progress Progressing toward goals   PT Therapy Minutes   PT Time In 1330   PT Time Out 1500   PT Total Time (minutes) 90   PT Mode of treatment - Individual (minutes) 90   PT Mode of treatment - Concurrent (minutes) 0   PT Mode of treatment - Group (minutes) 0   PT Mode of treatment - Co-treat (minutes) 0   PT Mode of Treatment - Total time(minutes) 90 minutes   PT Cumulative Minutes 270   Therapy Time missed   Time missed?  No

## 2022-01-14 NOTE — ASSESSMENT & PLAN NOTE
Lab Results   Component Value Date    HGBA1C 5 8 (H) 01/05/2022       Recent Labs     01/13/22  1119 01/13/22  1617 01/13/22 2036 01/14/22  0616   POCGLU 307* 137 212* 134       Blood Sugar Average: Last 72 hrs:  (P) 191 [id number]     A1c 5 8 on 1/5  Home regimen: metformin 1000mg BID and Tradjenta 5mg daily  Currently on SSI - increased to algorithm 3 on 1/13  Consider restarting metformin as renal function improves - Creatinine clearance 34  Continue QID accuchecks and cons  carb diet  Continue to follow-up with PCP as outpatient

## 2022-01-14 NOTE — PROGRESS NOTES
01/14/22 1000   Pain Assessment   Pain Assessment Tool 0-10   Pain Score No Pain   Restrictions/Precautions   Precautions Bed/chair alarms;Cognitive; Fall Risk;Supervision on toilet/commode   Weight Bearing Restrictions No   ROM Restrictions No   Oral Hygiene   Type of Assistance Needed Supervision   Physical Assistance Level No physical assistance   Comment standing at sink w/ CS and RW kept in close proximity   Oral Hygiene CARE Score 4   Grooming   Able To Brush/Clean Teeth;Wash/Dry Hands;Comb/Brush Hair   Limitation Noted In Strength;Timeliness   Sit to Stand   Type of Assistance Needed Verbal cues; Incidental touching   Physical Assistance Level No physical assistance   Comment CGA from WC > RW w/ cues for UE positioning and safe sequencing   Sit to Stand CARE Score 4   Bed-Chair Transfer   Type of Assistance Needed Incidental touching   Physical Assistance Level No physical assistance   Comment CGA using RW; cues for safe sequencing when approaching seated surface   Chair/Bed-to-Chair Transfer CARE Score 4   Transfer Bed/Chair/Wheelchair   Limitations Noted In Balance; Endurance;UE Strength;LE Strength; [address] Hygiene   Type of Assistance Needed Incidental touching   Physical Assistance Level No physical assistance   Comment seated to perform hygiene; CGA in stance for CM  Cues for safety as pt noted to be stepping on her pants while seated and required cues for improved insight regarding risk for falls   C[address] Score 4   Toileting   Able to [address] down yes, up yes  Manage Hygiene Bladder   Limitations Noted In Balance; Safety;LE Strength;UE Strength   Adaptive Equipment Grab Bar   Findings VCs for safe technique throughout   Toilet Transfer   Type of Assistance Needed Verbal cues; Incidental touching   Physical Assistance Level No physical assistance   Comment CGA to all-in-one commode placed over toilet; use of grab bar following cues for UE positoining   Toilet Transfer CARE Score 4   Toilet Transfer   Surface Assessed Standard Commode   Transfer Technique Standard   Limitations Noted In Balance; Endurance; Safety; Sequencing;UE Strength;LE Strength   Adaptive Equipment Grab Bar   Positioning Concerns Cognition; Safety   Health Management   Health Management Pt participated in simulated med management task to assess insight regarding med frequency, dosage, and function due to noted cognitive deficits  Pt required Max prompting throughout task to carryover simple instructions  Initially reported she utilized a AM/PM pill organizer  Attempted to complete task and was observed w/ increased difficulty  Pt then stated "this isn't like mine at home"  W/ further prompting to stated she utilizes a morning/noon/night pill organizer  Pt was able to properly identify medication purpose w/ 90% accuracy  Reported she is use to taking 12 different kinds of medication at home - previously did not have assistance  current med list exceeds 12 medications  Noted w/ frequent droppage of beads when placing into pill organizer; pt stated "oh that happens all the time at home and that's dangerous because of my dogs and grandkids"  Reports it is difficult for her to pick them up off the floor due to osteoarthritis in BL knees  Pt reported that she has missed medications in the past and is able to identify risks associated w/ same  Pt required increased time to sort pills into organizer  Frequent cueing required for continued effort as pt easily distracted and lethargic  Noted to require increase time to process  Pt completed task w/ <50% accuracy  Correctly placed x2 medications only  Pt was unable to recognize/identify mistakes or correct them w/o Max prompting and step by step instruction     Will discuss findings w/ OTR to discuss recommendation for level of assistance required for med management tasks due to noted cognitive deficits   Cognition Overall Cognitive Status Impaired   Arousal/Participation Alert; Cooperative;Lethargic   Attention Difficulty attending to directions   Orientation Level Oriented to person;Oriented to place;Oriented to situation;Disoriented to time   Memory Decreased recall of precautions   Following Commands Follows one step commands with increased time or repetition   Comments Completed med management task on this date; pt will require physical assistance as well as supervision at d/c due to noted cognitive deficits   Activity Tolerance   Activity Tolerance Patient limited by fatigue   Assessment   Treatment Assessment Pt participated in 90 minute skilled OT session focusing on functional transfers, ADL re-training, functional standing, and med  management  Pt disoriented to time and noted w/ slow processing throughout session  Pt also presented w/ problem solving deficits, sustained attention/focus to task, and insight into CLOF  Pt completed functional transfers and mob  using RW w/ CGA; requires cues for safe sequencing and UE positioning to carryover safe technique  Lacks carryover  Improved tolerance to functioanl standing on this date during lite grooming tasks tolerating ~4 minutes w/ CGA  Pt required increased time for med  management - refer to above assessment for details regarding same  Due to noted cognitive deficits pt will require increased supervision at d/c w/ all ADLs and physical A w/ IADLs  Discussed concerns w/ OTR  Pt will continue to benefit from skilled OT services to progress w/ stated goals, ensure safe d/c, and reduce caregiver burden  Prognosis Fair   Problem List Decreased strength;Decreased range of motion;Decreased endurance; Impaired balance;Decreased coordination;Decreased mobility; Decreased cognition; Impaired judgement;Decreased safety awareness   Barriers to Discharge Decreased caregiver support   Plan   Treatment/Interventions ADL retraining;Functional transfer training; Compensatory technique education;Patient/family training; Endurance training; Therapeutic exercise   Progress Progressing toward goals   Recommendation   OT Discharge Recommendation Home with home health rehabilitation   Equipment Recommended Bedside commode   OT Therapy Minutes   OT Time In 1000   OT Time Out 1130   OT Total Time (minutes) 90   OT Mode of treatment - Individual (minutes) 0   OT Mode of treatment - Concurrent (minutes) 0   OT Mode of treatment - Group (minutes) 0   OT Mode of treatment - Co-treat (minutes) 0   OT Mode of Treatment - Total time(minutes) 0 minutes   OT Cumulative Minutes 180   Therapy Time missed   Time missed?  No

## 2022-01-14 NOTE — ASSESSMENT & PLAN NOTE
Received IV Rocephin and oral Keflex to complete 5 day course for >100,000cfu E coli UTI in acute setting  Course completed on 1/9  Bonner in place for urinary retention - discontinued on 1/14  Monitor for reoccurring symptoms

## 2022-01-14 NOTE — ASSESSMENT & PLAN NOTE
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

## 2022-01-14 NOTE — PROGRESS NOTES
01/14/22 0900   Pain Assessment   Pain Assessment Tool 0-10   Pain Score No Pain   Restrictions/Precautions   Precautions Bed/chair alarms;Cognitive; Fall Risk;Impulsive;Supervision on toilet/commode   Comprehension   Comprehension (FIM) 4 - Understands basic info/conversation 75-90% of time   Expression   Expression (FIM) 5 - Needs help/cues only RARELY (< 10% of the time)   Social Interaction   Social Interaction (FIM) 5 - Interacts appropriately with others 90% of time   Problem Solving   Problem solving (FIM) 3 - Solves basic problmes 50-74% of time   Memory   Memory (FIM) 3 - Recognizes, recalls/performs 50-74%   Speech/Language/Cognition Assessmetn   Treatment Assessment Pt awake, alert and cooperative for session, to which pt was already OOB in [facility] just finishing breakfast  Given that tray was present, pt's ability to recall all items consumed at meal today was accurate, but orientation remains fair, as pt did demonstrate increased processing time given place/city, month and situation  Pt was errored on year and needed further probing cues to elicit additional reason for hospitalization besides "falls" to elicit stroke  Today, pt able to engage in structured task today, starting w/ a verbal category exclusion and word recall task  SLP provided pt w/ 4 words and was to identify the word which did not belong  Pt's overall recall given words was 32/45 accurate, needing full repetition of word lists on 3 different items due to decrease processing and decreased attention to word lists given  Ability for pt to recognize the word which did not belong was 9/15 accurate, increasing to 11/15 given repetition of word lists  For all remaining errors, pt needed increased cues and discussion as to similarities and difference given items  Next activity which pt engaged in was written 4 step sequencing task   Pt was 38/56 accurate in completing this task independently, needing moderate amount of cues to demonstrate awareness given errors, to which pt would state "this isn't something I do often" on this but able to then arrange the errored items in correct sequence given review  Pt needing ongoing education throughout session due to decreased insight to cognitive deficits as pt asked, "why is this important?" Overall, pt will continue to benefit from ongoing skilled SLP services at this time to maximize overall functional cognitive linguistic skills to decrease overall burden of care for family at discharge  SLP Therapy Minutes   SLP Time In 0900   SLP Time Out 0930   SLP Total Time (minutes) 30   SLP Mode of treatment - Individual (minutes) 30   SLP Mode of treatment - Concurrent (minutes) 0   SLP Mode of treatment - Group (minutes) 0   SLP Mode of treatment - Co-treat (minutes) 0   SLP Mode of Treatment - Total time(minutes) 30 minutes   SLP Cumulative Minutes 110   Therapy Time missed   Time missed?  No

## 2022-01-14 NOTE — PROGRESS NOTES
[facility]  Progress Note Alexx Weston 1950, 66 y o  female MRN: [id number]  Unit/Bed#: -16 Encounter: [id number]  Primary Care Provider: Elisabeth Claudio DO   Date and time admitted to hospital: 1/11/2022  3:39 PM    Lethargy  Assessment & Plan  Difficulty maintaining conversation on 1/12 - falling asleep multiple times during exam   Admits to snoring - overnight noc ox ordered for 1/12 - lowest saturation 88% for 1 minute  Does not qualify for oxygen  CT head on 1/12: "No new intracranial abnormality  Continued evolution of late subacute to chronic right basal ganglia infarct  No hemorrhage or significant mass effect "  Frequent neuro-checks  Per family, pt has been experiencing more daytime sleepiness since starting chemo  Dyslipidemia  Assessment & Plan  Started on Lipitor 80mg daily in acute setting - continue  Lipid panel on 1/5: Cholesterol 201, Triglycerides 252, HDL 41,   GERD (gastroesophageal reflux disease)  Assessment & Plan  Stable  Continue Protonix 40mg daily as substitute for non-formulary Prilosec 20mg daily  UTI (urinary tract infection)  Assessment & Plan  Received IV Rocephin and oral Keflex to complete 5 day course for >100,000cfu E coli UTI in acute setting  Course completed on 1/9  Bonner in place for urinary retention - discontinued on 1/14  Monitor for reoccurring symptoms  Urinary retention  Assessment & Plan  Urinary retention in acute setting  Completed 5 day course of abx for E coli UTI  Bonner placed on 1/7  Discontinued on 1/14 - urinary retention protocol  Atrial fibrillation (HCC)  Assessment & Plan  Continue metoprolol tartrate 25mg BID and sotalol 80mg BID for rate control  Continue Eliquis 5mg BID for anticoagulation  Monitor routine VS   Replete electrolytes as needed  Continue to follow-up with Dr Akin Hurst as outpatient  Depression  Assessment & Plan  Stable    Continue Buspar 7 5mg Q12 and Effexor 37 5mg daily  Supportive counseling as needed  Follows with Palliative as outpatient  Diabetes type 2, controlled [facility])  Assessment & Plan  Lab Results   Component Value Date    HGBA1C 5 8 (H) 01/05/2022       Recent Labs     01/13/22  1119 01/13/22  1617 01/13/22 2036 01/14/22  0616   POCGLU 307* 137 212* 134       Blood Sugar Average: Last 72 hrs:  (P) 191 [id number]     A1c 5 8 on 1/5  Home regimen: metformin 1000mg BID and Tradjenta 5mg daily  Currently on SSI - increased to algorithm 3 on 1/13  Consider restarting metformin as renal function improves - Creatinine clearance 34  Continue QID accuchecks and cons  carb diet  Continue to follow-up with PCP as outpatient  Hypertension  Assessment & Plan  Continue home regimen of Losartan 50mg daily and metoprolol tartrate 25mg BID  Started on amlodipine 5mg BID in acute setting - continue at this time  Started on Lasix 40mg daily in acute setting - only taking as needed at home  Currently on hold  Losartan restarted on 1/11 - decreased dose to 25mg daily on 1/12 due to lethargy/possible hypoperfusion  Decreased amlodipine to 5mg at HS on 1/14  Monitor BP with VS   Holding parameters for SBP <130  Continue to follow-up with Dr Ruchi Coughlin as outpatient  Chronic kidney disease, stage 3 [facility])  Assessment & Plan  Lab Results   Component Value Date    EGFR 28 01/14/2022    EGFR 28 01/12/2022    EGFR 29 01/11/2022    CREATININE 1 75 (H) 01/14/2022    CREATININE 1 76 (H) 01/12/2022    CREATININE 1 70 (H) 01/11/2022     Creatinine currently 1 75  Baseline appears to be 1 5-1 8  Avoid nephrotoxins  Encourage adequate hydration  Appears euvolemic  Lasix on hold  Continue to trend routine BMP  Previously seen by Dr Lady Ny (Nephrology) as an outpatient  Lung cancer [facility])  Assessment & Plan  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 [place]  Follows with Dr Brandon Moreno (Heme/Onc) as outpatient  * Acute ischemic stroke [facility])  Assessment & Plan  1/3 - admitted with AMS and multiple falls  CT head on 1/3: No acute intracranial abnormalities  MRI brain on : R basal ganglia and R caudate acute ischemia  MRI brain with contrast on : No enhancing lesions  Repeat CT head on : Evolving R basal ganglia infarct  No hemorrhage or significant mass effect  ECHO on  - EF 60% with grade 1 relaxation  EEG on  - Non-specific  Continue Lipitor 80mg daily and ASA 81mg daily  Likely related to small vessel disease  Maintain normotension  Neurovascular checks Q shift  Follow-up with Neurology as outpatient  PT/OT/Speech therapies  Primary team following  VTE Pharmacologic Prophylaxis:   Pharmacologic: Apixaban (Eliquis)  Mechanical VTE Prophylaxis in Place: Yes - sequential compression devices  Current Length of Stay: 3 day(s)    Current Patient Status: Inpatient Rehab     Discharge Plan: As per primary team     Code Status: Level 3 - DNAR and DNI    Subjective:   Pt examined while sitting in WC in pt room  Currently alert and awake  Had a good night last night  Feels that therapy is going well  Bonner removed this morning - states that she has been able to void on her own  BG have been elevated - increased insulin algorithm and reviewed diet with patient  States that she has been eating a lot of ice cream and cake  For breakfast, states that she ate waffles with fruit, syrup, and ice cream   Not the greatest historian  Will switch to lvl 1 cons  carb diet from lvl 2  Has no other concerns or complaints at this time      Objective:     Vitals:   Temp (24hrs), Av 6 °F (36 4 °C), Min:97 °F (36 1 °C), Max:98 2 °F (36 8 °C)    Temp:  [97 °F (36 1 °C)-98 2 °F (36 8 °C)] 97 °F (36 1 °C)  HR:  [59-75] 75  Resp:  [16-18] 18  BP: (109-165)/(56-79) 165/79  SpO2:  [96 %-98 %] 96 %  Body mass index is 28 34 kg/m²  Review of Systems   Constitutional: Negative for appetite change, chills, fatigue and fever  Respiratory: Negative for cough and shortness of breath  Cardiovascular: Negative for chest pain, palpitations and leg swelling  Gastrointestinal: Negative for abdominal distention, abdominal pain, constipation, diarrhea, nausea and vomiting  LBM 1/13   Genitourinary: Negative for difficulty urinating, dysuria, frequency, hematuria and urgency  Musculoskeletal: Negative for arthralgias and back pain  Neurological: Negative for dizziness, weakness, light-headedness, numbness and headaches  Psychiatric/Behavioral: Negative for sleep disturbance  Input and Output Summary (last 24 hours): Intake/Output Summary (Last 24 hours) at 1/14/2022 0946  Last data filed at 1/14/2022 0500  Gross per 24 hour   Intake 160 ml   Output 1650 ml   Net -1490 ml       Physical Exam:     Physical Exam  Vitals and nursing note reviewed  Constitutional:       Appearance: Normal appearance  HENT:      Head: Normocephalic and atraumatic  Cardiovascular:      Rate and Rhythm: Normal rate and regular rhythm  Pulses: Normal pulses  Heart sounds: Normal heart sounds  No murmur heard  No friction rub  Pulmonary:      Effort: Pulmonary effort is normal  No respiratory distress  Breath sounds: Normal breath sounds  No wheezing or rhonchi  Abdominal:      General: Abdomen is flat  Bowel sounds are normal  There is no distension  Palpations: Abdomen is soft  Tenderness: There is no abdominal tenderness  Musculoskeletal:      Cervical back: Normal range of motion and neck supple  Right lower leg: No edema  Left lower leg: No edema  Skin:     General: Skin is warm and dry  Capillary Refill: Capillary refill takes less than 2 seconds  Neurological:      Mental Status: She is alert and oriented to person, place, and time     Psychiatric: Mood and Affect: Mood normal          Behavior: Behavior normal          Additional Data:     Labs:    Results from last 7 days   Lab Units 01/12/22  0427   WBC Thousand/uL 9 70   HEMOGLOBIN g/dL 11 6*   HEMATOCRIT % 35 4*   PLATELETS Thousands/uL 201   NEUTROS PCT % 79*   LYMPHS PCT % 11*   MONOS PCT % 9   EOS PCT % 1     Results from last 7 days   Lab Units 01/14/22  0434   SODIUM mmol/L 137   POTASSIUM mmol/L 3 8   CHLORIDE mmol/L 103   CO2 mmol/L 31*   BUN mg/dL 38*   CREATININE mg/dL 1 75*   ANION GAP mmol/L 3*   CALCIUM mg/dL 8 2*   GLUCOSE RANDOM mg/dL 131*         Results from last 7 days   Lab Units 01/14/22  0616 01/13/22  2036 01/13/22  1617 01/13/22  1119 01/13/22  0607 01/12/22  2040 01/12/22  1624 01/12/22  1116 01/12/22  0609 01/11/22  2014 01/11/22  1619 01/11/22  1100   POC GLUCOSE mg/dl 134 212* 137 307* 156* 232* 119 275* 152* 173* 206* 201*               Labs reviewed    Imaging:    Imaging reviewed    Recent Cultures (last 7 days):           Last 24 Hours Medication List:   Current Facility-Administered Medications   Medication Dose Route Frequency Provider Last Rate    acetaminophen  650 mg Oral Q6H PRN Ousmane Dubois MD      amLODIPine  5 mg Oral HS EDUARDO Knutson      apixaban  5 mg Oral BID Ousmane Dubois MD      aspirin  81 mg Oral Daily Ousmane Dubois MD      atorvastatin  80 mg Oral Daily With Tyra Maurer MD      bisacodyl  10 mg Rectal Daily PRN Ousmane Dubois MD      busPIRone  7 5 mg Oral BID Ousmane Dubois MD      cholecalciferol  2,000 Units Oral Daily Ousmane Dubois MD      cyanocobalamin  500 mcg Oral Daily Ousmane Dubois MD      docusate sodium  100 mg Oral BID PRN Ousmane Dubois MD      folic acid  1 mg Oral Daily Ousmane Dubois MD      insulin lispro  1-6 Units Subcutaneous TID AC EDUARDO Knutson      loratadine  10 mg Oral Daily Ousmane Dubois MD      losartan  25 mg Oral Daily EDUARDO Knutson      metoprolol tartrate  25 mg Oral Q12H [address] Jake Ghosh MD      ondansetron  4 mg Oral Q6H PRN Jake Ghosh MD      pantoprazole  40 mg Oral Early Morning Jake Ghosh MD      polyethylene glycol  17 g Oral Daily PRN Jake Ghosh MD      sotalol  80 mg Oral BID Jake Ghosh MD      venlafaxine  37 5 mg Oral HS Jake Ghosh MD          M*Modal software was used to dictate this note  It may contain errors with dictating incorrect words or incorrect spelling  Please contact the provider directly with any questions

## 2022-01-14 NOTE — NURSING NOTE
Tried to bladder scan her with arc's did not register only dashes tried another one from [facility] did same thing also had other nurse try with same results looked like we only had 50cc since bahena removed did talk with md she did give ok to cath her which only resulted in 100cc clear yellow urine

## 2022-01-15 LAB
GLUCOSE SERPL-MCNC: 140 MG/DL (ref 65–140)
GLUCOSE SERPL-MCNC: 153 MG/DL (ref 65–140)
GLUCOSE SERPL-MCNC: 157 MG/DL (ref 65–140)
GLUCOSE SERPL-MCNC: 312 MG/DL (ref 65–140)

## 2022-01-15 PROCEDURE — 97535 SELF CARE MNGMENT TRAINING: CPT

## 2022-01-15 PROCEDURE — 82948 REAGENT STRIP/BLOOD GLUCOSE: CPT

## 2022-01-15 PROCEDURE — 97116 GAIT TRAINING THERAPY: CPT

## 2022-01-15 PROCEDURE — 99233 SBSQ HOSP IP/OBS HIGH 50: CPT | Performed by: PHYSICAL MEDICINE & REHABILITATION

## 2022-01-15 PROCEDURE — 97112 NEUROMUSCULAR REEDUCATION: CPT

## 2022-01-15 PROCEDURE — 97129 THER IVNTJ 1ST 15 MIN: CPT

## 2022-01-15 PROCEDURE — 97130 THER IVNTJ EA ADDL 15 MIN: CPT

## 2022-01-15 PROCEDURE — 97530 THERAPEUTIC ACTIVITIES: CPT

## 2022-01-15 PROCEDURE — 97110 THERAPEUTIC EXERCISES: CPT

## 2022-01-15 RX ADMIN — SOTALOL HYDROCHLORIDE 80 MG: 80 TABLET ORAL at 08:19

## 2022-01-15 RX ADMIN — APIXABAN 5 MG: 5 TABLET, FILM COATED ORAL at 08:19

## 2022-01-15 RX ADMIN — APIXABAN 5 MG: 5 TABLET, FILM COATED ORAL at 17:16

## 2022-01-15 RX ADMIN — INSULIN LISPRO 5 UNITS: 100 INJECTION, SOLUTION INTRAVENOUS; SUBCUTANEOUS at 11:59

## 2022-01-15 RX ADMIN — METOPROLOL TARTRATE 25 MG: 25 TABLET, FILM COATED ORAL at 08:19

## 2022-01-15 RX ADMIN — LORATADINE 10 MG: 10 TABLET ORAL at 08:18

## 2022-01-15 RX ADMIN — PANTOPRAZOLE SODIUM 40 MG: 40 TABLET, DELAYED RELEASE ORAL at 06:35

## 2022-01-15 RX ADMIN — BUSPIRONE HYDROCHLORIDE 7.5 MG: 15 TABLET ORAL at 08:18

## 2022-01-15 RX ADMIN — INSULIN LISPRO 1 UNITS: 100 INJECTION, SOLUTION INTRAVENOUS; SUBCUTANEOUS at 08:17

## 2022-01-15 RX ADMIN — LOSARTAN POTASSIUM 25 MG: 25 TABLET, FILM COATED ORAL at 08:18

## 2022-01-15 RX ADMIN — FOLIC ACID 1 MG: 1 TABLET ORAL at 08:18

## 2022-01-15 RX ADMIN — SOTALOL HYDROCHLORIDE 80 MG: 80 TABLET ORAL at 17:16

## 2022-01-15 RX ADMIN — Medication 2000 UNITS: at 08:19

## 2022-01-15 RX ADMIN — DICLOFENAC SODIUM 2 G: 10 GEL TOPICAL at 08:19

## 2022-01-15 RX ADMIN — ATORVASTATIN CALCIUM 80 MG: 80 TABLET, FILM COATED ORAL at 17:16

## 2022-01-15 RX ADMIN — VENLAFAXINE HYDROCHLORIDE 37.5 MG: 37.5 CAPSULE, EXTENDED RELEASE ORAL at 21:29

## 2022-01-15 RX ADMIN — AMLODIPINE BESYLATE 5 MG: 5 TABLET ORAL at 21:29

## 2022-01-15 RX ADMIN — ASPIRIN 81 MG: 81 TABLET, COATED ORAL at 08:19

## 2022-01-15 RX ADMIN — METOPROLOL TARTRATE 25 MG: 25 TABLET, FILM COATED ORAL at 21:29

## 2022-01-15 RX ADMIN — BUSPIRONE HYDROCHLORIDE 7.5 MG: 15 TABLET ORAL at 21:29

## 2022-01-15 RX ADMIN — CYANOCOBALAMIN TAB 1000 MCG 500 MCG: 1000 TAB at 08:18

## 2022-01-15 NOTE — NURSING NOTE
Pt had no problem urinating the whole shift  Pt has a total urine output of 1200 cc with clear, yellow urine  No c/o pain  Will monitor

## 2022-01-15 NOTE — PROGRESS NOTES
01/15/22 1030   Pain Assessment   Pain Assessment Tool 0-10   Pain Score No Pain   Restrictions/Precautions   Precautions Bed/chair alarms;Cognitive   Comprehension   Comprehension (FIM) 4 - Understands basic info/conversation 75-90% of time   Expression   Expression (FIM) 5 - Needs help/cues only RARELY (< 10% of the time)   Social Interaction   Social Interaction (FIM) 4 - Needs redirecting for appropriate language or to initiate interaction   Problem Solving   Problem solving (FIM) 2 - Solves basic problems 25-49% of time   Memory   Memory (FIM) 2 - Recalls 1 of 2 steps   Speech/Language/Cognition Assessmetn   Treatment Assessment SLP arrived for skilled ST session to addres cogntive linguistic deficits  Patient was upright in [facility] but preoccupied w/ cell phone and continued to engage in conversation despite SLP arrival  SLP introduced self to patient, but overall attention and focus remained on cell phone  SLP was able to gain attention and encourage patient to complete cell phone call to engage in session  Patient reported RFA was due to falls with no recall of stroke  Patient states "I guess that's what they tell me"  SLP engaged patient in a simple problem solving task requiring differentiating category members  SLP provided two step instructions  Patient did not request clarifications or assistance despite education to report difficulty and ask questions or for assistance as needed  Task was completed accurately in 3 out of 20 opportunities independently  SLP provided max assistance and semantic and category cues to increase accuracy to 20 out of 20 differentiating category members correctly identified  Patient with decreased recall and completion of directions only circling target for 3 items  Review and modeling of directions increased comprehension of task  Educated to advocate for assistance as needed with tasks to ensure accurate and safe completion    Overall poor insight into deficits, difficulty following directions, decreased problem solving observed today  Good attention to task and good reasoning following assitance for identification from SLP  While in the acute rehab center, pt will benefit from skilled SLP services to maximize overall functional independence of cognitive linguistic skills to decrease burden of care for family at time of discharge  SLP Therapy Minutes   SLP Time In 56   SLP Time Out 1100   SLP Total Time (minutes) 30   SLP Mode of treatment - Individual (minutes) 30   SLP Mode of treatment - Concurrent (minutes) 0   SLP Mode of treatment - Group (minutes) 0   SLP Mode of treatment - Co-treat (minutes) 0   SLP Mode of Treatment - Total time(minutes) 30 minutes   SLP Cumulative Minutes 140   Therapy Time missed   Time missed?  No

## 2022-01-15 NOTE — PLAN OF CARE
Problem: SAFETY ADULT  Goal: Patient will remain free of falls  Description: INTERVENTIONS:  - Educate patient/family on patient safety including physical limitations  - Instruct patient to call for assistance with activity   - Consult OT/PT to assist with strengthening/mobility   - Keep Call bell within reach  - Keep bed low and locked with side rails adjusted as appropriate  - Keep care items and personal belongings within reach  - Initiate and maintain comfort rounds  - Make Fall Risk Sign visible to staff  - Offer Toileting every 2 Hours, in advance of need  - Initiate/Maintain bed/chair alarm  - Obtain necessary fall risk management equipment: RW  - Apply yellow socks and bracelet for high fall risk patients  - Consider moving patient to room near nurses station  Outcome: Progressing     Problem: Neurological Deficit  Goal: Neurological status is stable or improving  Description: Interventions:  - Monitor and assess patient's level of consciousness, motor function, sensory function, and level of assistance needed for ADLs  - Monitor and report changes from baseline  Collaborate with interdisciplinary team to initiate plan and implement interventions as ordered  - Provide and maintain a safe environment  - Consider seizure precautions  - Consider fall precautions  - Consider aspiration precautions  - Consider bleeding precautions    Outcome: Progressing     Problem: GENITOURINARY - ADULT  Goal: Maintains or returns to baseline urinary function  Description: INTERVENTIONS:  - Assess urinary function  - Encourage oral fluids to ensure adequate hydration if ordered  - Administer IV fluids as ordered to ensure adequate hydration  - Administer ordered medications as needed  - Offer frequent toileting  - Follow urinary retention protocol if ordered  Outcome: Progressing

## 2022-01-15 NOTE — PROGRESS NOTES
01/15/22 1230   Pain Assessment   Pain Assessment Tool 0-10   Pain Score No Pain   Restrictions/Precautions   Precautions Bed/chair alarms;Cognitive; Fall Risk;Pain   Cognition   Overall Cognitive Status Impaired   Arousal/Participation Alert; Cooperative   Attention Attends with cues to redirect   Orientation Level Oriented to person;Oriented to place;Oriented to situation;Disoriented to time   Memory Decreased recall of precautions   Following Commands Follows one step commands with increased time or repetition   Subjective   Subjective alert and cooperative at this time  "It just feels like my knee is going to give out"   Roll Left and Right   Type of Assistance Needed Supervision   Physical Assistance Level No physical assistance   Comment on flat mat   Roll Left and Right CARE Score 4   Sit to Lying   Type of Assistance Needed Incidental touching   Physical Assistance Level No physical assistance   Sit to Lying CARE Score 4   Lying to Sitting on Side of Bed   Type of Assistance Needed Physical assistance;Verbal cues   Physical Assistance Level 51%-75%   Comment mod A for sup to sit from flat mat without HR  Lying to Sitting on Side of Bed CARE Score 2   Sit to Stand   Type of Assistance Needed Supervision;Verbal cues   Physical Assistance Level No physical assistance   Comment Cues for hand placement pt reeprots "i forget"   Sit to Stand CARE Score 4   Bed-Chair Transfer   Type of Assistance Needed Incidental touching   Physical Assistance Level No physical assistance   Comment CS with RW, CGA without AD      Chair/Bed-to-Chair Transfer CARE Score 4   Car Transfer   Reason if not Attempted Environmental limitations   Car Transfer CARE Score 10   Walk 10 Feet   Type of Assistance Needed Supervision   Physical Assistance Level No physical assistance   Walk 10 Feet CARE Score 4   Walk 50 Feet with Two Turns   Type of Assistance Needed Supervision   Physical Assistance Level No physical assistance   Comment CS with RW   Walk 50 Feet with Two Turns CARE Score 4   Walk 150 Feet   Type of Assistance Needed Incidental touching   Physical Assistance Level No physical assistance   Comment CGA with RW   Walk 150 Feet CARE Score 4   Walking 10 Feet on Uneven Surfaces   Type of Assistance Needed Incidental touching   Physical Assistance Level No physical assistance   Comment w/ RW - pt did not recall doing  this yesterday  CGA for safety  Walking 10 Feet on Uneven Surfaces CARE Score 4   Ambulation   Does the patient walk? 2  Yes   Primary Mode of Locomotion Prior to Admission Walk   Distance Walked (feet) 150 ft  (x2, 200)   Assist Device Roller Walker   Gait Pattern Slow Sushma; Shuffle;Step through   Limitations Noted In Endurance; Heel Strike;Speed;Strength;Swing   Provided Assistance with: Balance   Walk Assist Level Close Supervision;Contact Guard   Findings Improving gait however remains slowed  Short trials without AD for balance and challenge  Wheelchair mobility   Does the patient use a wheelchair? 0  No   Curb or Single Stair   Style negotiated Curb   Type of Assistance Needed Physical assistance;Verbal cues   Physical Assistance Level 25% or less   Comment 8in curb step with RW  cues for sequence  1 Step (Curb) CARE Score 3   4 Steps   Type of Assistance Needed Incidental touching   Physical Assistance Level No physical assistance   Comment BHR, recip ascending, nonrecip descending  4 Steps CARE Score 4   Stairs   Type Stairs   # of Steps 8   Assist Devices Bilateral Rail   Toilet Transfer   Type of Assistance Needed Incidental touching   Physical Assistance Level No physical assistance   Toilet Transfer CARE Score 4   Toilet Transfer   Surface Assessed Bedside Commode  (over toilet)   Positioning Concerns Cognition; Safety   Findings +urinary incontinence  Needing pant change, Pad placed for asorbtion, RN (monserrat) notified      Therapeutic Interventions   Strengthening Supine B LE TE: APs, SAQ, mod bridges over bolster, abd and heel slides with slide board, SLR x20 each  hooklying trunk rotations x10 each  Balance unsupported ball bounce x30  Neuromuscular Re-Education stepping over canes/hoops 2x10, 1x20ft   Assessment   Treatment Assessment Pt particpated well in 90 min skilled PT intervention with ongoing focus on gait, balance, and functional safety  Pt with improving gait dulce with RW, despite c/o fear of L knee buckling, no buckling observed  Pt exhibited x 1 LOB post stepping over/canes hoops at end of x20 ft, needing mod A for balance correction  Continue RW use to maximize safety  and fucntional I  +urinary incontinence during session, required pant change, RN notified  Mild B LE edema observed, pt setup in bedsdie recliner with feet elevated at end of session, chair alarm in place  Continue per POC, anticipate DC S level due to varying cognition deficits  Problem List Decreased strength;Decreased range of motion;Decreased endurance; Impaired balance;Decreased mobility; Decreased cognition; Impaired judgement;Decreased safety awareness   Barriers to Discharge Decreased caregiver support   PT Barriers   Physical Impairment Decreased strength;Decreased range of motion;Decreased endurance; Impaired balance;Decreased mobility; Impaired judgement;Decreased safety awareness;Pain   Plan   Treatment/Interventions Functional transfer training;LE strengthening/ROM; Elevations; Therapeutic exercise; Endurance training;Patient/family training;Gait training   Progress Progressing toward goals   Recommendation   PT Discharge Recommendation Home with home health rehabilitation   PT Equipment ordered pt has RW      PT Therapy Minutes   PT Time In 1230   PT Time Out 1400   PT Total Time (minutes) 90   PT Mode of treatment - Individual (minutes) 90   PT Mode of treatment - Concurrent (minutes) 0   PT Mode of treatment - Group (minutes) 0   PT Mode of treatment - Co-treat (minutes) 0   PT Mode of Treatment - Total time(minutes) 90 minutes   PT Cumulative Minutes 360   Therapy Time missed   Time missed?  No

## 2022-01-15 NOTE — PROGRESS NOTES
PM&R PROGRESS NOTE:  Kishan Pearson 66 y o  female MRN: [id number]  Unit/Bed#: -01 Encounter: [id number]        Rehab Diagnosis: Impairment of mobility, safety, Activities of Daily Living (ADLs), and cognitive/communication skills due to Stroke:  01 1  Left Body Involvement (Right Brain)  Right basal ganglia CVA     History of Present Illness:   Kishan Pearson is a 66 y o  female with known stage 4 lung cancer undergoing chemotherapy every 3 weeks, A fib on Eliquis, HTN, HLD, CHF (grade 2 DD), GERD, CKD3, DM2, bilateral knee osteoarthritis, depression/anxiety who presented to the [facility] [facility] [address] on 1/3/22 due to 4 falls and leg contusion  Patient found to have a right buttock hematoma which remained stable  Patient developed altered mental status  CTH negative  MRI brain ultimately revealed a acute right basal ganglia ischemic CVA  MRA/MRV negative for occlusive disease  MRI brain with contrast negative for any enhancing lesions  Patient seen by Neurology and determined etiology of CVA small vessel disease  Aspirin was added to her secondary CVA ppx  Medically, treated for UTI with Ceftriaxone and Keflex and has completed treatment  Also required a Bonner placement on 1/8/22 for urinary retention,  Patient had diarrhea transiently which improved  Patient's BP medications were adjusted to allow for perimissive HTN post CVA  After medical stabilization, patient was found to have acute functional deficits from her baseline in mobility, self care, and cognition and was admitted to [facility] for acute inpatient rehabilitation  SUBJECTIVE:  Patient seen face to face  Eating well today  States she has no major complaints  She is able to tell me why she is hear and understand she had a stroke  Left leg static strength is good however dynamic weakness when walking increased with fatigue  Provided full update to her daughter on 1/13/22       ASSESSMENT: Stable, progressing      PLAN:    Rehabilitation  · Functional deficits: left hemiparesis, bilateral proximal musculature weakness, impaired proprioception/sensation in feet, mild left dysmetria, impaired balance, impaired mobility, self care, impaired cognition   Continue current rehabilitation plan of care to maximize function   Estimated Discharge: TBD, RETEAM ; probable 10-14 days ELOS with Supervision goals    Physical Therapy Occupational Therapy Speech Therapy   Weight Bearing Status: Full Weight Bearing  Transfers: Minimal Assistance  Bed Mobility: Minimal Assistance  Amulation Distance (ft): 90 feet  Ambulation: Minimal Assistance  Assistive Device for Ambulation: Roller Walker  Number of Stairs: 4  Assistive Device for Stairs: Bilateral Hand Rails  Stair Assistance: Minimal Assistance  Ramp:  (TBA)  Discharge Recommendations: Home with:  [address] with[de-identified] Home Physical Therapy   Eating:  (setup)  Grooming: Supervision  Bathing: Minimal Assistance  Bathing: Minimal Assistance  Upper Body Dressing: Supervision  Lower Body Dressing: Moderate Assistance  Toileting: Minimal Assistance  Tub/Shower Transfer: Moderate Assistance  Toilet Transfer: Minimal Assistance  Cognition: Exceptions to WNL  Cognition: Decreased Memory,Decreased Executive Functions,Decreased Safety  Orientation: Person,Place   Mode of Communication: Verbal  Cognition: Exceptions to WNL  Cognition: Decreased Memory,Decreased Executive Functions,Decreased Comprehension,Decreased Safety,Decreased Attention  Orientation: Person,Place,Situation  Discharge Recommendations: Home with:  [address] with[de-identified] Family Support,24 Hour Supervision,24 Hour Assisteance,Home Speech Therapy,Outpatient Speech Therapy (pending pt progress)         Pain   No major complaints     Tylenol available if needed    DVT prophylaxis   Managed on Eliquis     Bladder plan   Bonner removed 1/14/22 ; straight cath x 1   Patient is now continent all day 1/15/22    Bowel plan   Continent   BM 1/14/22      * Acute ischemic stroke [facility])  Assessment & Plan  Presented with 4 falls and mental status change  MRI brain confirmed a right basal ganglia and right caudate ischemic CVA  Etiology: Small vessel disease  Secondary CVA ppx: managed on Eliquis 5mg BID, Lipitor 80mg daily, and now aspirin 81mg daily  Continue CVA education while at [address] [place] [address] education and reduction of modifiable risk factors  Monitor mood  Follow-up with Neurology as outpatient     Neuropathy due to chemotherapeutic drug [facility])  Assessment & Plan  Not painful  Consider topical lidoderm patch or gabapentin if pain develops    Chemotherapy-induced thrombocytopenia  Assessment & Plan  Plts = 167K > 201K   Improving  Continue to monitor    GERD (gastroesophageal reflux disease)  Assessment & Plan  Managed on Protonix (therapeutic exchange for Prilosec)    Diastolic congestive heart failure (HCC)  Assessment & Plan  Grade I DD on echocardiogram  Lasix on HOLD due to ADITYA  Patient euvolemic   IM consultants following and managing   Follows with Dr Alina Celaya    UTI (urinary tract infection)  Assessment & Plan  Resolved  Diagnosed in acute care  Urine culture + E  Coli   Treated with 5 days antibiotics Ceftriaxone and Keflex  Also was on oral vancomycin to prevent recurrent C   Diff  Asymptomatic     Urinary retention  Assessment & Plan  Likely related to UTI  Bonner catheter inserted 1/8/22   Bonner removed 1/14/22 ; straight cath x 1   Patient is now continent all day 1/15/22      Ground glass opacity present on imaging of lung  Assessment & Plan  Found incidentally on CT Chest  COVID - 19 negative   PCP to follow as outpatient     Atrial fibrillation [facility])  Assessment & Plan  Rate/rhythm controlled on Lopressor 25mg Q12hrs and Sotolol 80mg BID  Anticoagulated on Eliquis 5mg BID  Stable  IM consultants following while at [place] [id number] with Dr Michelle Abdalla on home regimen of Buspar 7 5mg BID and Effexor XR 37 5 QHS  Mood is stable  Consult neuropsychology if needed    Diabetes type 2, controlled [facility])  Assessment & Plan  Lab Results   Component Value Date    HGBA1C 5 8 (H) 01/05/2022     Managed here on SSI  At home was on Metformin and Tradjenta  Continue CCD  Nutrition consulted   IM consultants following and managing     Hypertension  Assessment & Plan  Managed here on Norvasc 5mg BID, Lopressor 25mg Q12h, Losartan 25mg daily  Lasix on HOLD  At home on same regimen as above  BP stable   Follows with Dr Britton Platt    Chronic kidney disease, stage 3 ([facility] [address] )  Assessment & Plan  ADITYA on CKD detail I  Baseline creatinine 1 5-1 8  Cr = 1 75  Lasix on HOLD as patient is euvolemic  Losartan reduced from 50mg daily to 25mg daily by IM consultants    Lung cancer [facility])  Assessment & Plan  Known stage 4 adenocarcinoma of lung   · Follows with Dr Toyin Haas  · [address] Chemotherapy every 3 weeks  Last infusion was 12/27/21  Missed her 1/7/22 infusion due to hospitalization  · Follow-up with Dr Toyin Haas as outpatient   CT Chest: Scattered groundglass opacities in both lungs which are new from prior exam   Consider infectious or inflammatory etiologies including Covid viral pneumonia  Previous right lower lobe lung mass has diminished since July  Previous left upper lobe tumor has also decreased in size but appears more atelectatic  Increasing water attenuation pleural fluid, partially loculated pleural fluid in the left lower lung  Follow-up per cancer imaging protocol  Contusion of left lower leg  Assessment & Plan  Sustained during fall  X-ray of right ankle is negative for fracture, however showing linear calcifications associated with the plantar fascia and Achilles tendon, these are chronic  Buttocks Hematoma  Assessment & Plan  Sustained during fall  CT showing Suspected small post traumatic hematoma measuring 2 cm in the soft tissues of the right buttock/perianal region    No active bleeding  Stable H&H  No overt pain complaints         Appreciate IM consultants medical co-management  Labs, medications, and imaging personally reviewed  ROS:  A ten point review of systems was completed on 01/15/22 and pertinent positives are listed in subjective section  All other systems reviewed were negative  OBJECTIVE:   /60 (BP Location: Right arm)   Pulse 56   Temp (!) 96 7 °F (35 9 °C) (Tympanic)   Resp 18   Ht 5' 4" (1 626 m)   Wt 74 9 kg (165 lb 2 oz)   SpO2 95%   BMI 28 34 kg/m²     Physical Exam  Vitals and nursing note reviewed  Constitutional:       General: She is not in acute distress  HENT:      Head: Normocephalic and atraumatic  Nose: Nose normal       Mouth/Throat:      Mouth: Mucous membranes are moist    Eyes:      Conjunctiva/sclera: Conjunctivae normal    Cardiovascular:      Rate and Rhythm: Normal rate and regular rhythm  Pulses: Normal pulses  Pulmonary:      Effort: Pulmonary effort is normal       Breath sounds: Normal breath sounds  No wheezing or rales  Abdominal:      General: Bowel sounds are normal  There is no distension  Palpations: Abdomen is soft  Tenderness: There is no abdominal tenderness  Musculoskeletal:         General: No swelling  Cervical back: Neck supple  Skin:     General: Skin is warm  Neurological:      Mental Status: She is alert and oriented to person, place, and time  Motor: Weakness (Mild left proximal musculature weakness) present        Comments: Insight and memory diminished   Psychiatric:         Mood and Affect: Mood normal           Lab Results   Component Value Date    WBC 9 70 01/12/2022    HGB 11 6 (L) 01/12/2022    HCT 35 4 (L) 01/12/2022    MCV 87 01/12/2022     01/12/2022     Lab Results   Component Value Date    SODIUM 137 01/14/2022    K 3 8 01/14/2022     01/14/2022    CO2 31 (H) 01/14/2022    BUN 38 (H) 01/14/2022    CREATININE 1 75 (H) 01/14/2022    GLUC 131 (H) 01/14/2022    CALCIUM 8 2 (L) 01/14/2022     Lab Results   Component Value Date    INR 1 13 01/03/2022    PROTIME 14 5 01/03/2022           Current Facility-Administered Medications:     acetaminophen (TYLENOL) tablet 650 mg, 650 mg, Oral, Q6H PRN, Micah Castro MD, 650 mg at 01/14/22 1345    amLODIPine (NORVASC) tablet 5 mg, 5 mg, Oral, HS, Jacob Bliss, CRNP, 5 mg at 01/14/22 2108    apixaban (ELIQUIS) tablet 5 mg, 5 mg, Oral, BID, Micah Castro MD, 5 mg at 01/15/22 1716    aspirin (ECOTRIN LOW STRENGTH) EC tablet 81 mg, 81 mg, Oral, Daily, Micah Castro MD, 81 mg at 01/15/22 0819    atorvastatin (LIPITOR) tablet 80 mg, 80 mg, Oral, Daily With Laisha Mart MD, 80 mg at 01/15/22 1716    bisacodyl (DULCOLAX) rectal suppository 10 mg, 10 mg, Rectal, Daily PRN, Micah Castro MD    busPIRone (BUSPAR) tablet 7 5 mg, 7 5 mg, Oral, BID, Micah Castro MD, 7 5 mg at 01/15/22 0818    cholecalciferol (VITAMIN D3) tablet 2,000 Units, 2,000 Units, Oral, Daily, Micah Castro MD, 2,000 Units at 01/15/22 0819    cyanocobalamin (VITAMIN B-12) tablet 500 mcg, 500 mcg, Oral, Daily, Micah Castro MD, 500 mcg at 01/15/22 0818    Diclofenac Sodium (VOLTAREN) 1 % topical gel 2 g, 2 g, Topical, TID, Micah Castro MD, 2 g at 01/15/22 0819    docusate sodium (COLACE) capsule 100 mg, 100 mg, Oral, BID PRN, Micah Castro MD    folic acid (FOLVITE) tablet 1 mg, 1 mg, Oral, Daily, Micah Castro MD, 1 mg at 01/15/22 0818    insulin lispro (HumaLOG) 100 units/mL subcutaneous injection 1-6 Units, 1-6 Units, Subcutaneous, TID AC, 5 Units at 01/15/22 1159 **AND** Fingerstick Glucose (POCT), , , TID AC, EDUARDO Donnelly    loratadine (CLARITIN) tablet 10 mg, 10 mg, Oral, Daily, Micah Castro MD, 10 mg at 01/15/22 0818    losartan (COZAAR) tablet 25 mg, 25 mg, Oral, Daily, EDUARDO Donnelly, 25 mg at 01/15/22 0818    metoprolol tartrate (LOPRESSOR) tablet 25 mg, 25 mg, Oral, Q12H [address], Micah Castro MD, 25 mg at 01/15/22 0819    ondansetron (ZOFRAN-ODT) dispersible tablet 4 mg, 4 mg, Oral, Q6H PRN, Venson Kocher, MD    pantoprazole (PROTONIX) EC tablet 40 mg, 40 mg, Oral, Early Morning, Venson Kocher, MD, 40 mg at 01/15/22 5881    polyethylene glycol (MIRALAX) packet 17 g, 17 g, Oral, Daily PRN, Venson Kocher, MD    sotalol (BETAPACE) tablet 80 mg, 80 mg, Oral, BID, Venson Kocher, MD, 80 mg at 01/15/22 1716    venlafaxine (EFFEXOR-XR) 24 hr capsule 37 5 mg, 37 5 mg, Oral, HS, Venson Kocher, MD, 37 5 mg at 01/14/22 2108    History reviewed  No pertinent past medical history  Patient Active Problem List    Diagnosis Date Noted    Acute ischemic stroke ([place] [address] ) 01/05/2022    Dyslipidemia 01/12/2022    Lethargy 01/12/2022    UTI (urinary tract infection) 23/17/9845    Diastolic congestive heart failure ([place] [address] ) 01/11/2022    GERD (gastroesophageal reflux disease) 01/11/2022    Chemotherapy-induced thrombocytopenia 01/11/2022    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[place] [address] ) 01/11/2022    Encephalopathy 01/06/2022    Urinary retention 01/06/2022    Confusion     Hypomagnesemia 01/04/2022    Ground glass opacity present on imaging of lung 01/04/2022    Buttocks Hematoma 01/03/2022    Contusion of left lower leg 01/03/2022    Lung cancer ([place] [address] ) 01/03/2022    Chronic kidney disease, stage 3 ([place] [address] ) 01/03/2022    Hypertension 01/03/2022    Diabetes type 2, controlled ([place] [address] ) 01/03/2022    Depression 01/03/2022    Hypokalemia 01/03/2022    Atrial fibrillation ([place] [address] ) 01/03/2022          Venson Kocher, MD    Total time spent:  30 minutes with more than 50% spent counseling/coordinating care  Counseling includes discussion with patient re: progress and discussion with patient of his/her current medical/functional state/information  Coordination of patient's care was performed in conjunction with consulting services   Time invested included review of patient's labs, vitals, and management of their comorbidities with continued monitoring  The care of the patient was extensively discussed and appropriate treatment plan was formulated unique for this patient  ** Please Note:  voice to text software may have been used in the creation of this document   Although proof errors in transcription or interpretation are a potential of such software**

## 2022-01-15 NOTE — PROGRESS NOTES
01/15/22 0830   Pain Assessment   Pain Assessment Tool 0-10   Pain Score No Pain   Restrictions/Precautions   Precautions Bed/chair alarms;Cognitive; Fall Risk;Supervision on toilet/commode   Weight Bearing Restrictions No   ROM Restrictions No   Lifestyle   Autonomy Pt agreeable to engage in Tx session  Oral Hygiene   Type of Assistance Needed Supervision   Physical Assistance Level No physical assistance   Comment CS in stance at sink top to complete oral hygiene   Oral Hygiene CARE Score 4   Grooming   Able To Initiate Tasks;Comb/Brush Hair;Brush/Clean Teeth;Wash/Dry Hands   Limitation Noted In Strength   Findings CS in stance at sink top to perform grooming routine with no LOB  Shower/Bathe Self   Type of Assistance Needed Physical assistance   Physical Assistance Level 25% or less   Comment Pt performed shower ADL demonstrating ability to bathe 10/10 parts  Utilized shower chair with back for majority of shower routine  CG/Reta for steadying while in stance at grab bar to bathe carolyn/buttocks  Pt reports having shower seat and grab bars installed in shower environment at home  Shower/Bathe Self CARE Score 3   Bathing   Assessed Bath Style Shower   Anticipated D/C Bath Style Shower   Able to Imtiaz Anish No   Able to [employer] Temperature No   Able to Wash/Rinse/Dry (body part) Left Arm;Right Arm;L Upper Leg;R Upper Leg;L Lower Leg/Foot;R Lower Leg/Foot;Chest;Abdomen;Perineal Area; Buttocks   Limitations Noted in Balance; Endurance   Positioning Seated;Standing   Adaptive Equipment Shower Bars;Hand Held Shower; Shower Seat   Tub/Shower Transfer   Limitations Noted In Balance; Endurance;LE Strength   Adaptive Equipment Grab Bars;Seat with Back   Assessed Shower   Findings Reta side stepping in/out of wets hower environment utilizing grab bar for support  Cues for RW position prior to entering shower environment     Upper Body Dressing   Type of Assistance Needed Supervision   Physical Assistance Level No physical assistance   Comment S seated to don loose fitting [place] sweatshirt   Upper Body Dressing CARE Score 4   Lower Body Dressing   Type of Assistance Needed Physical assistance   Physical Assistance Level 25% or less   Comment Seated to initiate threading BLE's into under garment and pants  CG/Reta while in stance unsupported to complete CM up over hips  No LOB observed  Lower Body Dressing CARE Score 3   Putting On/Taking Off Footwear   Type of Assistance Needed Supervision   Physical Assistance Level No physical assistance   Comment Seated to complete xleg technique to don slipper socks   Putting On/Taking Off Footwear CARE Score 4   Lying to Sitting on Side of Bed   Type of Assistance Needed Incidental touching   Physical Assistance Level No physical assistance   Comment CGA with HOB elevated and use of bed rail   Lying to Sitting on Side of Bed CARE Score 4   Sit to Stand   Type of Assistance Needed Supervision; Adaptive equipment;Verbal cues   Physical Assistance Level No physical assistance   Comment CS with RW and cues for proper hand placement   Sit to Stand CARE Score 4   Bed-Chair Transfer   Type of Assistance Needed Incidental touching; Adaptive equipment   Physical Assistance Level No physical assistance   Comment CGA/CS with RW for transfer's and fxnl mobility   Chair/Bed-to-Chair Transfer CARE Score 4   Toileting Hygiene   Type of Assistance Needed Incidental touching; Adaptive equipment   Physical Assistance Level No physical assistance   Comment CGA in stance at RW to complete CM and rear hygiene   Toileting Hygiene CARE Score 4   Toilet Transfer   Type of Assistance Needed Incidental touching; Adaptive equipment   Physical Assistance Level No physical assistance   Comment CGA with RW and use of over-the-toilet commode   Toilet Transfer CARE Score 4   Meal Prep   Meal Prep Level Walker  (folding RW tray)   Meal Prep Level of Assistance Contact guard   Meal Preparation Pt engaged in light meal prep task of making a cup of tea to simulate home routine  VASQUES introduced folding RW tray and provided brief EDU on benefit of utilizing for safe item transportation upon D/C  Pt receptive to trial  Pt retrieved cup of water from sink top, placing item on RW tray to transport over to microwave environment  Pt remained in stance while item heated x 1 min  Once item was heated, pt removed item from microwave and placed on RW tray to transport back to designated area  Pt prepared cup of tea while seated  Overall, pt req CGA throughout task with no LOB observed  Pt is receptive to continue utilizing folding RW tray for light IADL's  Plan to complete kitchen mobility in upcoming Tx session  Cognition   Overall Cognitive Status Impaired   Arousal/Participation Alert; Cooperative   Attention Attends with cues to redirect   Orientation Level Oriented to person;Oriented to place;Oriented to situation;Disoriented to time   Memory Decreased recall of precautions   Following Commands Follows one step commands with increased time or repetition   Activity Tolerance   Activity Tolerance Patient tolerated treatment well   Assessment   Treatment Assessment Pt participated in 90 min skilled OT Tx session with focus on ADL performance and light IADL management  See above for further Tx details  Pt is demonstrating improvement with ADL performance, completing ADL of bathing and dressing at Reta/CGA level  Pt engaged in light meal prep task of preparing a cup of tea to simulate home routine  Pt able to complete at CGA level utilizing RW and folding RW tray  OT plan to engage pt in kitchen mobility and item retrieval utilizing folding RW tray during upcoming Tx sesison  Continue POC  Prognosis Fair   Problem List Decreased strength;Decreased range of motion;Decreased endurance; Impaired balance;Decreased mobility; Decreased cognition; Impaired judgement;Decreased safety awareness   Barriers to Discharge Decreased caregiver support   Plan Treatment/Interventions ADL retraining;Functional transfer training; Therapeutic exercise; Endurance training   Progress Progressing toward goals   Recommendation   OT Discharge Recommendation Home with home health rehabilitation   OT - OK to Discharge No   OT Therapy Minutes   OT Time In 0830   OT Time Out 1000   OT Total Time (minutes) 90   OT Mode of treatment - Individual (minutes) 90   OT Mode of treatment - Concurrent (minutes) 0   OT Mode of treatment - Group (minutes) 0   OT Mode of treatment - Co-treat (minutes) 0   OT Mode of Treatment - Total time(minutes) 90 minutes   OT Cumulative Minutes 270   Therapy Time missed   Time missed?  No

## 2022-01-15 NOTE — PLAN OF CARE
Problem: SAFETY ADULT  Goal: Patient will remain free of falls  Description: INTERVENTIONS:  - Educate patient/family on patient safety including physical limitations  - Instruct patient to call for assistance with activity   - Consult OT/PT to assist with strengthening/mobility   - Keep Call bell within reach  - Keep bed low and locked with side rails adjusted as appropriate  - Keep care items and personal belongings within reach  - Initiate and maintain comfort rounds  - Make Fall Risk Sign visible to staff  - Offer Toileting every 4 Hours, in advance of need  - Initiate/Maintain alarm  - Obtain necessary fall risk management equipment: rw  - Apply yellow socks and bracelet for high fall risk patients  - Consider moving patient to room near nurses station  Outcome: Progressing  Goal: Maintain or return to baseline ADL function  Description: INTERVENTIONS:  -  Assess patient's ability to carry out ADLs; assess patient's baseline for ADL function and identify physical deficits which impact ability to perform ADLs (bathing, care of mouth/teeth, toileting, grooming, dressing, etc )  - Assess/evaluate cause of self-care deficits   - Assess range of motion  - Assess patient's mobility; develop plan if impaired  - Assess patient's need for assistive devices and provide as appropriate  - Encourage maximum independence but intervene and supervise when necessary  - Involve family in performance of ADLs  - Assess for home care needs following discharge   - Consider OT consult to assist with ADL evaluation and planning for discharge  - Provide patient education as appropriate  Outcome: Progressing  Goal: Maintains/Returns to pre admission functional level  Description: INTERVENTIONS:  - Perform BMAT or MOVE assessment daily    - Set and communicate daily mobility goal to care team and patient/family/caregiver     - Collaborate with rehabilitation services on mobility goals if consulted  - Perform Range of Motion 4 times a day   - Reposition patient every 4 hours    -   -   - Ambulate patient 4 times a day  - Out of bed to chair 3times a day   - Out of bed for meals 3 times a day  - Out of bed for toileting  - Record patient progress and toleration of activity level   Outcome: Progressing

## 2022-01-16 LAB
GLUCOSE SERPL-MCNC: 129 MG/DL (ref 65–140)
GLUCOSE SERPL-MCNC: 151 MG/DL (ref 65–140)
GLUCOSE SERPL-MCNC: 166 MG/DL (ref 65–140)
GLUCOSE SERPL-MCNC: 239 MG/DL (ref 65–140)

## 2022-01-16 PROCEDURE — 97530 THERAPEUTIC ACTIVITIES: CPT

## 2022-01-16 PROCEDURE — 97116 GAIT TRAINING THERAPY: CPT

## 2022-01-16 PROCEDURE — 97112 NEUROMUSCULAR REEDUCATION: CPT

## 2022-01-16 PROCEDURE — 97535 SELF CARE MNGMENT TRAINING: CPT

## 2022-01-16 PROCEDURE — 82948 REAGENT STRIP/BLOOD GLUCOSE: CPT

## 2022-01-16 PROCEDURE — NC001 PR NO CHARGE: Performed by: PHYSICAL MEDICINE & REHABILITATION

## 2022-01-16 PROCEDURE — 97110 THERAPEUTIC EXERCISES: CPT

## 2022-01-16 RX ADMIN — METOPROLOL TARTRATE 25 MG: 25 TABLET, FILM COATED ORAL at 21:12

## 2022-01-16 RX ADMIN — FOLIC ACID 1 MG: 1 TABLET ORAL at 08:48

## 2022-01-16 RX ADMIN — INSULIN LISPRO 3 UNITS: 100 INJECTION, SOLUTION INTRAVENOUS; SUBCUTANEOUS at 12:04

## 2022-01-16 RX ADMIN — ASPIRIN 81 MG: 81 TABLET, COATED ORAL at 08:48

## 2022-01-16 RX ADMIN — ACETAMINOPHEN 650 MG: 325 TABLET ORAL at 08:59

## 2022-01-16 RX ADMIN — Medication 2000 UNITS: at 08:48

## 2022-01-16 RX ADMIN — VENLAFAXINE HYDROCHLORIDE 37.5 MG: 37.5 CAPSULE, EXTENDED RELEASE ORAL at 21:12

## 2022-01-16 RX ADMIN — LOSARTAN POTASSIUM 25 MG: 25 TABLET, FILM COATED ORAL at 08:47

## 2022-01-16 RX ADMIN — PANTOPRAZOLE SODIUM 40 MG: 40 TABLET, DELAYED RELEASE ORAL at 06:38

## 2022-01-16 RX ADMIN — APIXABAN 5 MG: 5 TABLET, FILM COATED ORAL at 08:47

## 2022-01-16 RX ADMIN — CYANOCOBALAMIN TAB 1000 MCG 500 MCG: 1000 TAB at 08:48

## 2022-01-16 RX ADMIN — ATORVASTATIN CALCIUM 80 MG: 80 TABLET, FILM COATED ORAL at 17:14

## 2022-01-16 RX ADMIN — INSULIN LISPRO 1 UNITS: 100 INJECTION, SOLUTION INTRAVENOUS; SUBCUTANEOUS at 08:49

## 2022-01-16 RX ADMIN — BUSPIRONE HYDROCHLORIDE 7.5 MG: 15 TABLET ORAL at 08:48

## 2022-01-16 RX ADMIN — METOPROLOL TARTRATE 25 MG: 25 TABLET, FILM COATED ORAL at 08:48

## 2022-01-16 RX ADMIN — LORATADINE 10 MG: 10 TABLET ORAL at 08:48

## 2022-01-16 RX ADMIN — SOTALOL HYDROCHLORIDE 80 MG: 80 TABLET ORAL at 08:48

## 2022-01-16 RX ADMIN — APIXABAN 5 MG: 5 TABLET, FILM COATED ORAL at 17:14

## 2022-01-16 RX ADMIN — SOTALOL HYDROCHLORIDE 80 MG: 80 TABLET ORAL at 17:14

## 2022-01-16 RX ADMIN — BUSPIRONE HYDROCHLORIDE 7.5 MG: 15 TABLET ORAL at 21:12

## 2022-01-16 NOTE — PROGRESS NOTES
01/16/22 1000   Pain Assessment   Pain Assessment Tool 0-10   Pain Score No Pain   Restrictions/Precautions   Precautions Bed/chair alarms;Cognitive; Fall Risk;Pain   Lying to Sitting on Side of Bed   Type of Assistance Needed Physical assistance   Physical Assistance Level 26%-50%   Comment Mod A to sit up - needed HHA for trunk   Lying to Sitting on Side of Bed CARE Score 3   Sit to Stand   Type of Assistance Needed Supervision   Comment RW   Sit to Stand CARE Score 4   Bed-Chair Transfer   Type of Assistance Needed Supervision   Comment RW   Chair/Bed-to-Chair Transfer CARE Score 4   Walk 10 Feet   Type of Assistance Needed Supervision   Comment RW   Walk 10 Feet CARE Score 4   Walk 50 Feet with Two Turns   Type of Assistance Needed Supervision   Comment RW   Walk 50 Feet with Two Turns CARE Score 4   Walk 150 Feet   Type of Assistance Needed Supervision   Comment RW   Walk 150 Feet CARE Score 4   Walking 10 Feet on Uneven Surfaces   Type of Assistance Needed Supervision   Comment RW   Walking 10 Feet on Uneven Surfaces CARE Score 4   Ambulation   Does the patient walk? 2  Yes   Primary Mode of Locomotion Prior to Admission Walk   Distance Walked (feet) 250 ft  (250 RW,  100+20x4 no device)   Assist Device Roller Walker   Walk Assist Level Close Supervision;Supervision   Wheel 50 Feet with Two Turns   Reason if not Attempted Activity not applicable   Wheel 50 Feet with Two Turns CARE Score 9   Wheel 150 Feet   Reason if not Attempted Activity not applicable   Wheel 822 Feet CARE Score 9   Wheelchair mobility   Does the patient use a wheelchair? 0   No   4 Steps   Type of Assistance Needed Supervision   Comment bilat HR   4 Steps CARE Score 4   Stairs   Type Stairs   # of Steps 8   Assist Devices Bilateral Rail   Findings to prevent L knee pain used step to pattern - good foot placement no mis step or LOB   Therapeutic Interventions   Balance Amb short distance of 20 - 30 feet unsupported,   Std unsupported 2 sets to fatigue of alt toe tap on 2'' box,  Std on foam pad physioball bounce and catch,  Amb on floor mat multiple times with canes placed under for eneven and soft terrain performed with RW and then without RW   Equipment Use   NuStep 15mins for neuro prime SPM 30s   Other Comments   Comments Need to work on bed mobility   Assessment   Treatment Assessment Pt making progress with balance- amb no device more consistantly without LOB and appears much more stable with RW  Still most likely need walker due to high risk for knee buckle  Cont modified NPP with activities without device toward independent level for safe D/C home  Barriers to Discharge Decreased caregiver support   PT Barriers   Physical Impairment Decreased strength;Decreased range of motion;Decreased endurance; Impaired balance;Decreased mobility; Impaired judgement;Decreased safety awareness;Pain   Plan   Progress Progressing toward goals   Recommendation   PT Discharge Recommendation Home with home health rehabilitation   PT Therapy Minutes   PT Time In 1000   PT Time Out 1130   PT Total Time (minutes) 90   PT Mode of treatment - Individual (minutes) 90   PT Mode of treatment - Concurrent (minutes) 0   PT Mode of treatment - Group (minutes) 0   PT Mode of treatment - Co-treat (minutes) 0   PT Mode of Treatment - Total time(minutes) 90 minutes   PT Cumulative Minutes 450

## 2022-01-16 NOTE — PROGRESS NOTES
01/16/22 0700   Pain Assessment   Pain Assessment Tool 0-10   Pain Score 5   Pain Location/Orientation Orientation: Left; Location: Knee   Pain Onset/Description Onset: Gradual   Effect of Pain on Daily Activities Tolerated   Patient's Stated Pain Goal No pain   Hospital Pain Intervention(s) Rest   Restrictions/Precautions   Precautions Bed/chair alarms;Cognitive; Fall Risk;Supervision on toilet/commode;Pain   Weight Bearing Restrictions No   ROM Restrictions No   Eating   Type of Assistance Needed Independent   Physical Assistance Level No physical assistance   Eating CARE Score 6   Putting On/Taking Off Footwear   Type of Assistance Needed Supervision   Physical Assistance Level No physical assistance   Comment Seated to perform  xleg technique to don and tie B/L sneakers   Putting On/Taking Off Footwear CARE Score 4   Picking Up Object   Type of Assistance Needed Physical assistance; Adaptive equipment   Physical Assistance Level 25% or less   Comment Reta with RW performing dynamic reach to retrieve laundry item from flr level  Picking Up Object CARE Score 3   Sit to Stand   Type of Assistance Needed Supervision; Adaptive equipment   Physical Assistance Level No physical assistance   Comment RW   Sit to Stand CARE Score 4   Bed-Chair Transfer   Type of Assistance Needed Supervision   Physical Assistance Level No physical assistance   Comment CS with RW   Chair/Bed-to-Chair Transfer CARE Score 4   Toileting Hygiene   Type of Assistance Needed Supervision; Adaptive equipment   Physical Assistance Level No physical assistance   Comment CS in stance at RW for CM and carloyn hygiene   Toileting Hygiene CARE Score 4   Toilet Transfer   Type of Assistance Needed Supervision; Adaptive equipment   Physical Assistance Level No physical assistance   Comment CS with RW and use of over-the toilet commode   Toilet Transfer CARE Score 4   Kitchen Mobility   Kitchen-Mobility Level Walker  (Folding RW tray)   Kitchen Activity Retrieve items;Transport items   Kitchen Mobility Comments Pt engaged in kitchen mobility task utilizing RW and folding RW tray to retrieve items from around kitchen environment  This VASQUES provided pt with list of 5 items to retrieve from around kitchen environment  Once items were located, Pt utilized RW tray to transport items back to designated area  No LOB throughout activity req CS during fxnl mobility and while performing fxnl reach for item retrieval    Light Housekeeping   Light Housekeeping Level [facility] [employer] Level of Assistance Close supervision   Light Housekeeping Engaged in simulated laundry activity req pt to engage in fxnl mobility with RW and retrieve laundry items from around OT gym  Pt utilized folding RW tray to transport laundry items to simulated washer  Pt able to place/remove items from washer with CS and no LOB  Pt completed laundry folding instance with focus on improving fxnl standing balance  No LOB throughout folding task  Therapeutic Excerise-Strength   UE Strength Yes   Right Upper Extremity- Strength   R Shoulder Flexion;ABduction; Extension;Horizontal ABduction; External rotation; Internal rotation   R Elbow Elbow flexion;Elbow extension   R Position Standing;Seated   Equipment Dumbbell  (2#)   R Weight/Reps/Sets 2 x 15   RUE Strength Comment Pt engaged in UB TE utilizing 2# dumbbell to perform 1 set of 15 reps seated, and 1 set of 15 reps in stance  Focus of UB TE to improve UB strength for ADL performance, as well as improve fxnl standing balance when TE is performed in stance  Pt overall receptive to EDU and tolerated UB TE well with no c/o of pain  Left Upper Extremity-Strength   L Shoulder Flexion;ABduction; Extension;Horizontal ABduction; External rotation; Internal rotation   L Elbow Elbow flexion;Elbow extension   L Position Standing  (and seated)   Equipment Dumbell  (2#)   L Weights/Reps/Sets 2 x 15   LUE Strength Comment See above     Cognition   Overall Cognitive Status Impaired   Arousal/Participation Alert; Cooperative   Attention Attends with cues to redirect   Orientation Level Oriented to person;Oriented to place;Oriented to situation;Disoriented to time   Memory Decreased recall of precautions;Decreased short term memory   Following Commands Follows one step commands with increased time or repetition   Activity Tolerance   Activity Tolerance Patient tolerated treatment well   Assessment   Treatment Assessment Pt participated in 90 min skilled OT Tx session with focus on IADL management, ADL performance, and improving overall activity tolerance  See above for further Tx details  Pt is making positive gains towards OT goals, completing IADL's and ADL of toileting and managing footwear at CS level  Pt would benefit from purchasing folding RW tray for D/C to assist with item transport during light IADL management  OT plan to engage pt in light meal prep task of cooking eggs or pasta on stove top, fxnl standing balance, and item negotiation with RW  Continue POC  Prognosis Fair   Problem List Decreased strength;Decreased range of motion;Decreased endurance; Impaired balance;Decreased mobility; Decreased cognition; Impaired judgement;Decreased safety awareness;Pain   Barriers to Discharge Decreased caregiver support   Plan   Treatment/Interventions ADL retraining;Functional transfer training; Therapeutic exercise; Endurance training   Progress Progressing toward goals   Recommendation   OT Discharge Recommendation Home with home health rehabilitation   Equipment Recommended   (folding RW tray)   OT - OK to Discharge No   OT Therapy Minutes   OT Time In 0700   OT Time Out 0830   OT Total Time (minutes) 90   OT Mode of treatment - Individual (minutes) 90   OT Mode of treatment - Concurrent (minutes) 0   OT Mode of treatment - Group (minutes) 0   OT Mode of treatment - Co-treat (minutes) 0   OT Mode of Treatment - Total time(minutes) 90 minutes   OT Cumulative Minutes 360 Therapy Time missed   Time missed?  No

## 2022-01-16 NOTE — PROGRESS NOTES
Called RN to discuss update and plan:    Patient is voiding successfully post TOV started 1/14/22  PVR on 2 separate machines not recording properly  Unclear etiology but anatomy may be different post her previous hysterectomy        Jolene Mattson MD

## 2022-01-16 NOTE — NURSING NOTE
Patient earlier today got up from her wheelchair set alarm off and put herself to bed again reinforced that she needs to use call bell  gave patient stroke book to look at patient did open the book

## 2022-01-16 NOTE — PLAN OF CARE
Problem: SAFETY ADULT  Goal: Patient will remain free of falls  Description: INTERVENTIONS:  - Educate patient/family on patient safety including physical limitations  - Instruct patient to call for assistance with activity   - Consult OT/PT to assist with strengthening/mobility   - Keep Call bell within reach  - Keep bed low and locked with side rails adjusted as appropriate  - Keep care items and personal belongings within reach  - Initiate and maintain comfort rounds  - Make Fall Risk Sign visible to staff  - Offer Toileting every4 Hours, in advance of need  - Initiate/Maintain alarm  - Obtain necessary fall risk management equipment: wc  - Apply yellow socks and bracelet for high fall risk patients  - Consider moving patient to room near nurses station  Outcome: Progressing  Goal: Maintain or return to baseline ADL function  Description: INTERVENTIONS:  -  Assess patient's ability to carry out ADLs; assess patient's baseline for ADL function and identify physical deficits which impact ability to perform ADLs (bathing, care of mouth/teeth, toileting, grooming, dressing, etc )  - Assess/evaluate cause of self-care deficits   - Assess range of motion  - Assess patient's mobility; develop plan if impaired  - Assess patient's need for assistive devices and provide as appropriate  - Encourage maximum independence but intervene and supervise when necessary  - Involve family in performance of ADLs  - Assess for home care needs following discharge   - Consider OT consult to assist with ADL evaluation and planning for discharge  - Provide patient education as appropriate  Outcome: Progressing  Goal: Maintains/Returns to pre admission functional level  Description: INTERVENTIONS:  - Perform BMAT or MOVE assessment daily    - Set and communicate daily mobility goal to care team and patient/family/caregiver     - Collaborate with rehabilitation services on mobility goals if consulted  - Perform Range of Motion 4times a day   - Reposition patient every 4 hours    -  -   - Ambulate patient 4 times a day  - Out of bed to chair 4 times a day   - Out of bed for meals 3 times a day  - Out of bed for toileting  - Record patient progress and toleration of activity level   Outcome: Progressing

## 2022-01-17 ENCOUNTER — TELEPHONE (OUTPATIENT)
Dept: HEMATOLOGY ONCOLOGY | Facility: CLINIC | Age: 72
End: 2022-01-17

## 2022-01-17 PROBLEM — R33.9 URINARY RETENTION: Status: RESOLVED | Noted: 2022-01-06 | Resolved: 2022-01-17

## 2022-01-17 PROBLEM — T45.1X5A CHEMOTHERAPY-INDUCED THROMBOCYTOPENIA: Status: RESOLVED | Noted: 2022-01-11 | Resolved: 2022-01-17

## 2022-01-17 PROBLEM — N39.0 UTI (URINARY TRACT INFECTION): Status: RESOLVED | Noted: 2022-01-11 | Resolved: 2022-01-17

## 2022-01-17 PROBLEM — D69.59 CHEMOTHERAPY-INDUCED THROMBOCYTOPENIA: Status: RESOLVED | Noted: 2022-01-11 | Resolved: 2022-01-17

## 2022-01-17 LAB
ANION GAP SERPL CALCULATED.3IONS-SCNC: 3 MMOL/L (ref 5–14)
BASOPHILS # BLD AUTO: 0.1 THOUSANDS/ΜL (ref 0–0.1)
BASOPHILS NFR BLD AUTO: 1 % (ref 0–1)
BUN SERPL-MCNC: 34 MG/DL (ref 5–25)
CALCIUM SERPL-MCNC: 8.2 MG/DL (ref 8.4–10.2)
CHLORIDE SERPL-SCNC: 107 MMOL/L (ref 97–108)
CO2 SERPL-SCNC: 27 MMOL/L (ref 22–30)
CREAT SERPL-MCNC: 1.69 MG/DL (ref 0.6–1.2)
EOSINOPHIL # BLD AUTO: 0.3 THOUSAND/ΜL (ref 0–0.4)
EOSINOPHIL NFR BLD AUTO: 4 % (ref 0–6)
ERYTHROCYTE [DISTWIDTH] IN BLOOD BY AUTOMATED COUNT: 18.1 %
GFR SERPL CREATININE-BSD FRML MDRD: 29 ML/MIN/1.73SQ M
GLUCOSE P FAST SERPL-MCNC: 150 MG/DL (ref 70–99)
GLUCOSE SERPL-MCNC: 109 MG/DL (ref 65–140)
GLUCOSE SERPL-MCNC: 144 MG/DL (ref 65–140)
GLUCOSE SERPL-MCNC: 150 MG/DL (ref 70–99)
GLUCOSE SERPL-MCNC: 264 MG/DL (ref 65–140)
HCT VFR BLD AUTO: 32.5 % (ref 36–46)
HGB BLD-MCNC: 10.6 G/DL (ref 12–16)
LYMPHOCYTES # BLD AUTO: 1.1 THOUSANDS/ΜL (ref 0.5–4)
LYMPHOCYTES NFR BLD AUTO: 14 % (ref 25–45)
MCH RBC QN AUTO: 28.5 PG (ref 26–34)
MCHC RBC AUTO-ENTMCNC: 32.4 G/DL (ref 31–36)
MCV RBC AUTO: 88 FL (ref 80–100)
MONOCYTES # BLD AUTO: 0.7 THOUSAND/ΜL (ref 0.2–0.9)
MONOCYTES NFR BLD AUTO: 9 % (ref 1–10)
NEUTROPHILS # BLD AUTO: 5.6 THOUSANDS/ΜL (ref 1.8–7.8)
NEUTS SEG NFR BLD AUTO: 72 % (ref 45–65)
PLATELET # BLD AUTO: 233 THOUSANDS/UL (ref 150–450)
PMV BLD AUTO: 9.7 FL (ref 8.9–12.7)
POTASSIUM SERPL-SCNC: 4 MMOL/L (ref 3.6–5)
RBC # BLD AUTO: 3.7 MILLION/UL (ref 4–5.2)
SODIUM SERPL-SCNC: 137 MMOL/L (ref 137–147)
WBC # BLD AUTO: 7.8 THOUSAND/UL (ref 4.5–11)

## 2022-01-17 PROCEDURE — 85025 COMPLETE CBC W/AUTO DIFF WBC: CPT | Performed by: NURSE PRACTITIONER

## 2022-01-17 PROCEDURE — 97116 GAIT TRAINING THERAPY: CPT

## 2022-01-17 PROCEDURE — 97530 THERAPEUTIC ACTIVITIES: CPT

## 2022-01-17 PROCEDURE — 97535 SELF CARE MNGMENT TRAINING: CPT

## 2022-01-17 PROCEDURE — 80048 BASIC METABOLIC PNL TOTAL CA: CPT | Performed by: NURSE PRACTITIONER

## 2022-01-17 PROCEDURE — 99232 SBSQ HOSP IP/OBS MODERATE 35: CPT | Performed by: INTERNAL MEDICINE

## 2022-01-17 PROCEDURE — 99232 SBSQ HOSP IP/OBS MODERATE 35: CPT | Performed by: PHYSICAL MEDICINE & REHABILITATION

## 2022-01-17 PROCEDURE — 97112 NEUROMUSCULAR REEDUCATION: CPT

## 2022-01-17 PROCEDURE — 82948 REAGENT STRIP/BLOOD GLUCOSE: CPT

## 2022-01-17 PROCEDURE — 97110 THERAPEUTIC EXERCISES: CPT

## 2022-01-17 RX ORDER — GLIMEPIRIDE 2 MG/1
1 TABLET ORAL
Status: DISCONTINUED | OUTPATIENT
Start: 2022-01-17 | End: 2022-01-17

## 2022-01-17 RX ADMIN — ASPIRIN 81 MG: 81 TABLET, COATED ORAL at 08:59

## 2022-01-17 RX ADMIN — PANTOPRAZOLE SODIUM 40 MG: 40 TABLET, DELAYED RELEASE ORAL at 06:07

## 2022-01-17 RX ADMIN — BUSPIRONE HYDROCHLORIDE 7.5 MG: 15 TABLET ORAL at 21:09

## 2022-01-17 RX ADMIN — CYANOCOBALAMIN TAB 1000 MCG 500 MCG: 1000 TAB at 08:55

## 2022-01-17 RX ADMIN — FOLIC ACID 1 MG: 1 TABLET ORAL at 08:56

## 2022-01-17 RX ADMIN — SOTALOL HYDROCHLORIDE 80 MG: 80 TABLET ORAL at 08:55

## 2022-01-17 RX ADMIN — ATORVASTATIN CALCIUM 80 MG: 80 TABLET, FILM COATED ORAL at 17:03

## 2022-01-17 RX ADMIN — LOSARTAN POTASSIUM 25 MG: 25 TABLET, FILM COATED ORAL at 08:55

## 2022-01-17 RX ADMIN — Medication 2000 UNITS: at 08:55

## 2022-01-17 RX ADMIN — APIXABAN 5 MG: 5 TABLET, FILM COATED ORAL at 08:55

## 2022-01-17 RX ADMIN — LORATADINE 10 MG: 10 TABLET ORAL at 08:55

## 2022-01-17 RX ADMIN — INSULIN LISPRO 3 UNITS: 100 INJECTION, SOLUTION INTRAVENOUS; SUBCUTANEOUS at 11:58

## 2022-01-17 RX ADMIN — SOTALOL HYDROCHLORIDE 80 MG: 80 TABLET ORAL at 17:03

## 2022-01-17 RX ADMIN — GLIMEPIRIDE 1 MG: 2 TABLET ORAL at 11:57

## 2022-01-17 RX ADMIN — METOPROLOL TARTRATE 25 MG: 25 TABLET, FILM COATED ORAL at 08:56

## 2022-01-17 RX ADMIN — BUSPIRONE HYDROCHLORIDE 7.5 MG: 15 TABLET ORAL at 08:55

## 2022-01-17 RX ADMIN — APIXABAN 5 MG: 5 TABLET, FILM COATED ORAL at 17:03

## 2022-01-17 RX ADMIN — METOPROLOL TARTRATE 25 MG: 25 TABLET, FILM COATED ORAL at 21:08

## 2022-01-17 RX ADMIN — VENLAFAXINE HYDROCHLORIDE 37.5 MG: 37.5 CAPSULE, EXTENDED RELEASE ORAL at 21:08

## 2022-01-17 NOTE — TELEPHONE ENCOUNTER
Patient's daughter, Rojas Canchola, calling with additional questions concerning her chemotherapy treatment    She can be reached at [phone]

## 2022-01-17 NOTE — PROGRESS NOTES
[facility]  Progress Note Annette Burger 1950, 66 y o  female MRN: [id number]  Unit/Bed#: [facility] 377-50 Encounter: [id number]  Primary Care Provider: Bryanna Sheridan DO   Date and time admitted to hospital: 1/11/2022  3:39 PM    Lethargy  Assessment & Plan  Difficulty maintaining conversation on 1/12 - falling asleep multiple times during exam   Admits to snoring - overnight noc ox ordered for 1/12 - lowest saturation 88% for 1 minute  Does not qualify for oxygen  CT head on 1/12: "No new intracranial abnormality  Continued evolution of late subacute to chronic right basal ganglia infarct  No hemorrhage or significant mass effect "  Frequent neuro-checks  Per family, pt has been experiencing more daytime sleepiness since starting chemo  Dyslipidemia  Assessment & Plan  Started on Lipitor 80mg daily in acute setting - continue  Lipid panel on 1/5: Cholesterol 201, Triglycerides 252, HDL 41,   GERD (gastroesophageal reflux disease)  Assessment & Plan  Stable  Continue Protonix 40mg daily as substitute for non-formulary Prilosec 20mg daily  UTI (urinary tract infection)  Assessment & Plan  Received IV Rocephin and oral Keflex to complete 5 day course for >100,000cfu E coli UTI in acute setting  Course completed on 1/9  Bonner in place for urinary retention - discontinued on 1/14  Monitor for reoccurring symptoms  Urinary retention  Assessment & Plan  Urinary retention in acute setting  Completed 5 day course of abx for E coli UTI  Bonner placed on 1/7  Discontinued on 1/14 - passed TOV  Atrial fibrillation (HCC)  Assessment & Plan  Continue metoprolol tartrate 25mg BID and sotalol 80mg BID for rate control  Continue Eliquis 5mg BID for anticoagulation  Monitor routine VS   Replete electrolytes as needed  Continue to follow-up with Dr Eden Ho as outpatient  Depression  Assessment & Plan  Stable    Continue Buspar 7 5mg Q12 and Effexor 37 5mg daily  Supportive counseling as needed  Follows with Palliative as outpatient  Diabetes type 2, controlled [facility])  Assessment & Plan  Lab Results   Component Value Date    HGBA1C 5 8 (H) 01/05/2022       Recent Labs     01/16/22  1131 01/16/22  1636 01/16/22  2053 01/17/22  0610   POCGLU 239* 129 166* 144*       Blood Sugar Average: Last 72 hrs:  (P) 256 [id number]     A1c 5 8 on 1/5  Home regimen: metformin 1000mg BID and Tradjenta 5mg daily  Currently on SSI - increased to algorithm 3 on 1/13  Creatinine clearance 35  Started on glimepiride 1mg daily on 1/17  Continue QID accuchecks and cons  carb diet  Continue to follow-up with PCP as outpatient  Hypertension  Assessment & Plan  Continue home regimen of Losartan 50mg daily and metoprolol tartrate 25mg BID  Started on amlodipine 5mg BID in acute setting - continue at this time  Started on Lasix 40mg daily in acute setting - only taking as needed at home  Currently on hold  Losartan restarted on 1/11 - decreased dose to 25mg daily on 1/12 due to lethargy/possible hypoperfusion  Decreased amlodipine to 5mg at HS on 1/14  Monitor BP with VS   Holding parameters for SBP <130  Continue to follow-up with Dr Malorie Mixon as outpatient  Chronic kidney disease, stage 3 [facility])  Assessment & Plan  Lab Results   Component Value Date    EGFR 29 01/17/2022    EGFR 28 01/14/2022    EGFR 28 01/12/2022    CREATININE 1 69 (H) 01/17/2022    CREATININE 1 75 (H) 01/14/2022    CREATININE 1 76 (H) 01/12/2022     Creatinine currently 1 69  Baseline appears to be 1 5-1 8  Avoid nephrotoxins  Encourage adequate hydration  Appears euvolemic  Lasix on hold  Continue to trend routine BMP  Previously seen by Dr Jefry Chun (Nephrology) as an outpatient  Lung cancer [facility])  Assessment & Plan  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  Currently receiving chemotherapy: Ramucirumab + Docetaxel Q21 days    Will need to restart after discharge from [facility]  Follows with Dr Giacomo Real (Heme/Onc) as outpatient  * Acute ischemic stroke [facility])  Assessment & Plan  1/3 - admitted with AMS and multiple falls  CT head on 1/3: No acute intracranial abnormalities  MRI brain on : R basal ganglia and R caudate acute ischemia  MRI brain with contrast on : No enhancing lesions  Repeat CT head on : Evolving R basal ganglia infarct  No hemorrhage or significant mass effect  ECHO on  - EF 60% with grade 1 relaxation  EEG on  - Non-specific  Continue Lipitor 80mg daily and ASA 81mg daily  Likely related to small vessel disease  Maintain normotension  Neurovascular checks Q shift  Follow-up with Neurology as outpatient  PT/OT/Speech therapies  Primary team following  VTE Pharmacologic Prophylaxis:   Pharmacologic: Apixaban (Eliquis)  Mechanical VTE Prophylaxis in Place: Yes - sequential compression devices  Current Length of Stay: 6 day(s)    Current Patient Status: Inpatient Rehab     Discharge Plan: As per primary team     Code Status: Level 3 - DNAR and DNI    Subjective:   Pt examined while pt sitting in recliner in pt room  Currently has no complaints  Sleeping well at night  Therapy is going well and feels that she is making great improvements  Eating well  Had a BM yesterday morning  Voiding well since bahena removal on Friday  Objective:     Vitals:   Temp (24hrs), Av 4 °F (36 3 °C), Min:96 8 °F (36 °C), Max:97 9 °F (36 6 °C)    Temp:  [96 8 °F (36 °C)-97 9 °F (36 6 °C)] 97 6 °F (36 4 °C)  HR:  [58-67] 67  Resp:  [18] 18  BP: (113-133)/(56-73) 133/73  SpO2:  [94 %-98 %] 94 %  Body mass index is 28 34 kg/m²  Review of Systems   Constitutional: Negative for appetite change, chills, fatigue and fever  Respiratory: Negative for cough and shortness of breath  Cardiovascular: Negative for chest pain, palpitations and leg swelling     Gastrointestinal: Negative for abdominal distention, abdominal pain, constipation, diarrhea, nausea and vomiting  LBM 1/16   Genitourinary: Negative for difficulty urinating  Musculoskeletal: Negative for arthralgias and back pain  Neurological: Negative for dizziness, weakness, light-headedness, numbness and headaches  Psychiatric/Behavioral: Negative for sleep disturbance  Input and Output Summary (last 24 hours): Intake/Output Summary (Last 24 hours) at 1/17/2022 0929  Last data filed at 1/17/2022 0901  Gross per 24 hour   Intake 1010 ml   Output 700 ml   Net 310 ml       Physical Exam:     Physical Exam  Vitals and nursing note reviewed  Constitutional:       Appearance: Normal appearance  HENT:      Head: Normocephalic and atraumatic  Cardiovascular:      Rate and Rhythm: Normal rate and regular rhythm  Pulses: Normal pulses  Heart sounds: Normal heart sounds  No murmur heard  No friction rub  Pulmonary:      Effort: Pulmonary effort is normal  No respiratory distress  Breath sounds: Normal breath sounds  No wheezing or rhonchi  Abdominal:      General: Abdomen is flat  Bowel sounds are normal  There is no distension  Palpations: Abdomen is soft  Tenderness: There is no abdominal tenderness  Musculoskeletal:      Cervical back: Normal range of motion and neck supple  Right lower leg: Edema (Minimal non-pitting edema) present  Left lower leg: Edema (Minimal non-pitting edema) present  Skin:     General: Skin is warm and dry  Capillary Refill: Capillary refill takes less than 2 seconds  Neurological:      Mental Status: She is alert and oriented to person, place, and time     Psychiatric:         Mood and Affect: Mood normal          Behavior: Behavior normal          Additional Data:     Labs:    Results from last 7 days   Lab Units 01/17/22  0603   WBC Thousand/uL 7 80   HEMOGLOBIN g/dL 10 6*   HEMATOCRIT % 32 5*   PLATELETS Thousands/uL 233   NEUTROS PCT % 72*   LYMPHS PCT % 14*   MONOS PCT % 9 EOS PCT % 4     Results from last 7 days   Lab Units 01/17/22  0603   SODIUM mmol/L 137   POTASSIUM mmol/L 4 0   CHLORIDE mmol/L 107   CO2 mmol/L 27   BUN mg/dL 34*   CREATININE mg/dL 1 69*   ANION GAP mmol/L 3*   CALCIUM mg/dL 8 2*   GLUCOSE RANDOM mg/dL 150*         Results from last 7 days   Lab Units 01/17/22  0610 01/16/22  2053 01/16/22  1636 01/16/22  1131 01/16/22  0637 01/15/22  2044 01/15/22  1625 01/15/22  1130 01/15/22  0611 01/14/22  2045 01/14/22  1622 01/14/22  1133   POC GLUCOSE mg/dl 144* 166* 129 239* 151* 157* 140 312* 153* 171* 132 301*               Labs reviewed    Imaging:    Imaging reviewed    Recent Cultures (last 7 days):           Last 24 Hours Medication List:   Current Facility-Administered Medications   Medication Dose Route Frequency Provider Last Rate    acetaminophen  650 mg Oral Q6H PRN Jolene Mattson MD      amLODIPine  5 mg Oral HS EDUARDO [place]      apixaban  5 mg Oral BID Jolene Mattson MD      aspirin  81 mg Oral Daily Jolene Mattson MD      atorvastatin  80 mg Oral Daily With Gissel Orona MD      bisacodyl  10 mg Rectal Daily PRN Jolene Mattson MD      busPIRone  7 5 mg Oral BID Jolene Mattson MD      cholecalciferol  2,000 Units Oral Daily Jolene Mattson MD      cyanocobalamin  500 mcg Oral Daily Jolene Mattson MD      Diclofenac Sodium  2 g Topical TID Jolene Mattson MD      docusate sodium  100 mg Oral BID PRN Jolene Mattson MD      folic acid  1 mg Oral Daily Jolene Mattson MD      glimepiride  1 mg Oral Daily With Breakfast EDUARDO [place]      insulin lispro  1-6 Units Subcutaneous TID AC EDUARDO [place]      loratadine  10 mg Oral Daily Jolene Mattson MD      losartan  25 mg Oral Daily EDUARDO [place]      metoprolol tartrate  25 mg Oral Q12H [facility] oJlene Mattson MD      ondansetron  4 mg Oral Q6H PRN Jolene Mattson MD      pantoprazole  40 mg Oral Early Morning Jolene Mattson MD      polyethylene glycol  17 g Oral Daily PRN Harden Valentine Franklyn Ruiz MD      sotalol  80 mg Oral BID Tirso Fair MD      venlafaxine  37 5 mg Oral HS Tirso Fair MD          M*Modal software was used to dictate this note  It may contain errors with dictating incorrect words or incorrect spelling  Please contact the provider directly with any questions

## 2022-01-17 NOTE — PROGRESS NOTES
01/17/22 1000   Pain Assessment   Pain Score No Pain   Restrictions/Precautions   Precautions Bed/chair alarms;Cognitive; Fall Risk;Supervision on toilet/commode   Weight Bearing Restrictions No   ROM Restrictions No   Lifestyle   Autonomy "That's me I've done all the unsafe stuff"   Putting On/Taking Off Footwear   Type of Assistance Needed Supervision   Physical Assistance Level No physical assistance   Comment G fx reach to don sneakers   Putting On/Taking Off Footwear CARE Score 4   Sit to Stand   Type of Assistance Needed Supervision; Adaptive equipment   Physical Assistance Level No physical assistance   Comment S with RW   Sit to Stand CARE Score 4   Bed-Chair Transfer   Type of Assistance Needed Supervision   Physical Assistance Level No physical assistance   Comment S with RW   Chair/Bed-to-Chair Transfer CARE Score 4   Toileting Hygiene   Type of Assistance Needed Supervision; Adaptive equipment   Physical Assistance Level No physical assistance   Comment S with RW, attempted to void before leaving room, but unable to void, S for CM   Toileting Hygiene CARE Score 4   Toilet Transfer   Type of Assistance Needed Supervision; Adaptive equipment   Physical Assistance Level No physical assistance   Comment S with RW   Toilet Transfer CARE Score 4   Functional Standing Tolerance   Activity standing balance   Comments engaged pt in dynamic balance w/o AD to challenge ENRIQUE, endurance, strength, coord and weight shifting while completing balloon toss  Tolerated well with no LOB observed  Cognition   Overall Cognitive Status Impaired   Arousal/Participation Alert; Cooperative   Attention Attends with cues to redirect   Orientation Level Oriented to person;Oriented to place;Oriented to situation   Memory Decreased short term memory;Decreased recall of precautions   Following Commands Follows one step commands with increased time or repetition   Additional Activities   Additional Activities Other (Comment)  (safety in and around the home set 1)   Additional Activities Comments therapist engaged pt in safety cards to challenge safety and problem solving  Pt tolerated well and able to correctly identify 10/10 safe cards and 13/13 unsafe cards with appropriate reasoning and how to make situation safe  Activity Tolerance   Activity Tolerance Patient tolerated treatment well   Assessment   Treatment Assessment Engaged pt in brief 30mins of skilled OT services with focus on toileting and safety  Pt at this time perofrming all transfers and fx mobility at S level with noted improved safety  Pt observed to check wc brakes were locked prior to standing without vc  2* to cog deficits cont to recommend 24/7 supervision DC as per phone call before family able to provide  Prognosis Fair   Problem List Decreased strength;Decreased endurance; Impaired balance;Decreased mobility; Decreased cognition; Impaired judgement;Decreased safety awareness   Barriers to Discharge   (fmaily able to provide)   Plan   Treatment/Interventions ADL retraining;Functional transfer training; Therapeutic exercise; Endurance training;Patient/family training;Bed mobility; Compensatory technique education   Progress Progressing toward goals   Recommendation   OT Discharge Recommendation Home with home health rehabilitation   OT - OK to Discharge No   OT Therapy Minutes   OT Time In 1000   OT Time Out 1030   OT Total Time (minutes) 30   OT Mode of treatment - Individual (minutes) 30   OT Mode of treatment - Concurrent (minutes) 0   OT Mode of treatment - Group (minutes) 0   OT Mode of treatment - Co-treat (minutes) 0   OT Mode of Treatment - Total time(minutes) 30 minutes   OT Cumulative Minutes 480   Therapy Time missed   Time missed?  No

## 2022-01-17 NOTE — ASSESSMENT & PLAN NOTE
Continue home regimen of Losartan 50mg daily and metoprolol tartrate 25mg BID  Started on amlodipine 5mg BID in acute setting - continue at this time  Started on Lasix 40mg daily in acute setting - only taking as needed at home  Currently on hold  Losartan restarted on 1/11 - decreased dose to 25mg daily on 1/12 due to lethargy/possible hypoperfusion  Decreased amlodipine to 5mg at HS on 1/14  Monitor BP with VS   Holding parameters for SBP <130  Continue to follow-up with Dr Laurie Marquez as outpatient

## 2022-01-17 NOTE — PROGRESS NOTES
Physical Medicine and Rehabilitation Progress Note  Christie Hightower 66 y o  female MRN: [id number]  Unit/Bed#: -63 Encounter: [id number]    HPI: Christie Hightower is a 66 y o  female with known stage 4 juan cancer undergoing chemotherapy every 3 weeks, A fib on Eliquis, HTN, HLD, CHF (grade 2 DD), GERD, CKD3, DM2, bilateral knee osteoarthritis, depression/anxiety who presented to the "[facility] [address] on 1/3/22 due to 4 falls and leg contusion  Patient found to have a right buttock hematoma which remained stable  Patient developed altered mental status  CTH negative  MRI brain ultimately revealed a acute right basal ganglia ischemic CVA  MRA/MRV negative for occlusive disease  MRI brain with contrast negative for any enhancing lesions  Patient seen by Neurology and determined etiology of CVA small vessel disease  Aspirin was added to her secondary CVA ppx  Course c/b by UTI (s/p abx), urinary retention (bahena placed), diarrhea (Resolved)  Admitted to [facility] on 1/11  Chief Complaint: Feeling fine, no issues today  Interval History/Subjective:  No acute events over the weekend  Denies any CP, SOB, fevers, chills, N/V, abdominal pain  Last BM was 1/16  Bladder function has improved and has been continent  Mental status has improved since her last MOCA      ROS:  A 10 point review of systems was negative except for what is noted in the HPI  Today's Changes:  1  T2DM: IM to adjust, may start Januvia 25mg  Switching from Metformin due to kidney function  2  Otherwise, continue current plan of care  Assessment/Plan:    * Acute ischemic stroke [facility])  Assessment & Plan  Presented with 4 falls and mental status change  MRI brain confirmed a right basal ganglia and right caudate ischemic CVA  Etiology: Small vessel disease most likely     Secondary CVA ppx: managed on Eliquis 5mg BID, Lipitor 80mg daily, and now aspirin 81mg daily  Continue CVA education while at [address] [place] [address] education and reduction of modifiable risk factors  Monitor mood  Follow-up with Neurology as outpatient     Lethargy  Assessment & Plan  Unclear etiology, has since resolved  MOCA early in stay was 8/30, but will recheck  Neuropathy due to chemotherapeutic drug [facility])  Assessment & Plan  Not painful  Consider topical lidoderm patch or gabapentin if pain develops    GERD (gastroesophageal reflux disease)  Assessment & Plan  Managed on Protonix (therapeutic exchange for Prilosec)    Diastolic congestive heart failure (HCC)  Assessment & Plan  Grade I DD on echocardiogram  Lasix on HOLD due to ADITYA  Patient euvolemic, continue to monitor  Continue beta blockers, ARB  IM consultants following and managing   Follows with Dr Susan Lozoya glass opacity present on imaging of lung  Assessment & Plan  Found incidentally on CT Chest  COVID - 19 negative   PCP to follow as outpatient     Atrial fibrillation [facility])  Assessment & Plan  Rate/rhythm controlled on Lopressor 25mg Q12hrs and Sotolol 80mg BID  Anticoagulated on Eliquis 5mg BID  Stable  IM consultants following while at [place] [address] with Dr Kamla Knapp on home regimen of Buspar 7 5mg BID and Effexor XR 37 5 QHS  Mood is stable  Consult neuropsychology if needed    Diabetes type 2, controlled [facility])  Assessment & Plan  Lab Results   Component Value Date    HGBA1C 5 8 (H) 01/05/2022     At home was on Metformin and Tradjenta  Here: [place] not formulary  IM initially started glimepiride, but will be switched to Januvia in the AM    Continue CCI, diabetic diet, accuchecks  Nutrition consulted   IM consultants following and managing     Hypertension  Assessment & Plan  Managed here on Norvasc 5mg BID, Lopressor 25mg Q12h, Losartan 25mg daily      Lasix on HOLD  At home on same regimen as above  BP stable   Follows with Dr Britton Platt    Chronic kidney disease, stage 3 [facility])  Assessment & Plan  ADITYA on CKD detail I  Baseline creatinine 1 5-1 8  Cr = 1 69  Lasix on HOLD as patient is euvolemic  Avoid nephrotoxic meds, monitor volume status  Losartan reduced from 50mg daily to 25mg daily by IM consultants    Lung cancer [facility])  Assessment & Plan  Known stage 4 adenocarcinoma of lung   · Follows with Dr Beto Mays  · [facility] Chemotherapy every 3 weeks  Last infusion was 12/27/21  Missed her 1/7/22 infusion due to hospitalization  · Follow-up with Dr Beto Mays as outpatient   CT Chest: Scattered groundglass opacities in both lungs which are new from prior exam   Consider infectious or inflammatory etiologies including Covid viral pneumonia  Previous right lower lobe lung mass has diminished since July  Previous left upper lobe tumor has also decreased in size but appears more atelectatic  Increasing water attenuation pleural fluid, partially loculated pleural fluid in the left lower lung  Follow-up per cancer imaging protocol  Contusion of left lower leg  Assessment & Plan  Sustained during fall  X-ray of right ankle is negative for fracture, however showing linear calcifications associated with the plantar fascia and Achilles tendon, these are chronic  Buttocks Hematoma  Assessment & Plan  Sustained during fall  CT showing Suspected small post traumatic hematoma measuring 2 cm in the soft tissues of the right buttock/perianal region  No active bleeding  Stable H&H  No overt pain complaints     Chemotherapy-induced thrombocytopenia-resolved as of 1/17/2022  Assessment & Plan  Resolved  1/17 Plt 233  UTI (urinary tract infection)-resolved as of 1/17/2022  Assessment & Plan  Resolved  Diagnosed in acute care  Urine culture + E  Coli   Treated with 5 days antibiotics Ceftriaxone and Keflex  Also was on oral vancomycin to prevent recurrent C   Diff  Asymptomatic     Urinary retention-resolved as of 1/17/2022  Assessment & Plan  Likely related to UTI  Bonner catheter inserted 1/8/22   Bonner removed 1/14/22 ; straight cath x 1   Patient is now continent all day 1/15/22   Retention has resolved  Health Maintenance  #Delirium/Sleep: At high risk  Optimize bowel/bladder/pain management  Environmental interventions - avoid physical and chemical restraint  Redirect  #Pain: Tylenol PRN  #Bowel: Last BM 1/16, rectal suppository and miralax ordered PRN  #Bladder:  Voiding and continent  #Skin/Pressure Injury Prevention: Turn Q2hr in bed, with weight shifts [id number] in wheelchair  #DVT Prophylaxis: Fully anticoagulated on eliquis, SCDs  #GI Prophylaxis: Protonix  #Code Status:  Full Code  #FEN:  Diabetic diet  #Dispo:  Team on 1/13: Plan to reteam        Objective:    Functional Update:  PT: Sup bed mobility, CGA transfers, Reta ambulation,  OT:  Progressing to Sup with ADLs  SLP:  Reta for comprehension, Social Interaction, Sup for expression, maxA problem solving/memory  Allergies per EMR    Physical Exam:  Temp:  [96 8 °F (36 °C)-97 9 °F (36 6 °C)] 97 6 °F (36 4 °C)  HR:  [58-67] 67  Resp:  [18] 18  BP: (113-133)/(56-73) 133/73  Oxygen Therapy  SpO2: 94 %    Gen: No acute distress, Well-nourished, well-appearing  HEENT: Moist mucus membranes  Cardiovascular: Regular rate, rhythm, S1/S2  Distal pulses palpable  Heme/Extr: Mild BL LE edema  Pulmonary: Non-labored breathing  : No bahena  GI: Soft, non-tender, non-distended  BS+   MSK: ROM is full in all extremities  No effusions or deformities  Bulk is symmetric  See below for MMT scores  Integumentary: Skin is warm, dry  Neuro: AAOx3,  Speech is intact  Appropriate to questioning  Psych: Normal mood and affect  Diagnostic Studies: reviewed, no new imaging  CT head wo contrast    Result Date: 1/12/2022  Impression: No new intracranial abnormality  Continued evolution of late subacute to chronic right basal ganglia infarct  No hemorrhage or significant mass effect  Workstation performed: [id number]     Laboratory:  Reviewed labs     Results from last 7 days   Lab Units 01/17/22  0603 01/12/22  0427   HEMOGLOBIN g/dL 10 6* 11 6*   HEMATOCRIT % 32 5* 35 4*   WBC Thousand/uL 7 80 9 70     Results from last 7 days   Lab Units 01/17/22  0603 01/14/22  0434 01/12/22  0427   BUN mg/dL 34* 38* 35*   POTASSIUM mmol/L 4 0 3 8 3 8   CHLORIDE mmol/L 107 103 101   CREATININE mg/dL 1 69* 1 75* 1 76*            Patient Active Problem List   Diagnosis    Buttocks Hematoma    Contusion of left lower leg    Lung cancer (HCC)    Chronic kidney disease, stage 3 (HCC)    Hypertension    Diabetes type 2, controlled ([facility] )    Depression    Hypokalemia    Atrial fibrillation (HCC)    Hypomagnesemia    Ground glass opacity present on imaging of lung    Acute ischemic stroke ([facility] )    Encephalopathy    Urinary retention    Confusion    UTI (urinary tract infection)    Diastolic congestive heart failure (HCC)    GERD (gastroesophageal reflux disease)    Chemotherapy-induced thrombocytopenia    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HCC)    Dyslipidemia    Lethargy         Medications  Current Facility-Administered Medications   Medication Dose Route Frequency Provider Last Rate    acetaminophen  650 mg Oral Q6H PRN Gideon Edge MD      amLODIPine  5 mg Oral HS EDUARDO Huynh      apixaban  5 mg Oral BID Gideon Edge MD      aspirin  81 mg Oral Daily Gideon Edge MD      atorvastatin  80 mg Oral Daily With Fabiola Alegre MD      bisacodyl  10 mg Rectal Daily PRN Gideon Edge MD      busPIRone  7 5 mg Oral BID Gideon Edge MD      cholecalciferol  2,000 Units Oral Daily Gideon Edge MD      cyanocobalamin  500 mcg Oral Daily Gideon Edge MD      Diclofenac Sodium  2 g Topical TID Gideon Edge MD      docusate sodium  100 mg Oral BID PRN Gideon Edge MD      folic acid  1 mg Oral Daily Gideon Edge MD      glimepiride  1 mg Oral Daily With Breakfast EDUARDO Huynh      insulin lispro  1-6 Units Subcutaneous TID [facility] Emiliana Dudley EDUARDO Vincent      loratadine  10 mg Oral Daily Светлана Marie MD      losartan  25 mg Oral Daily EDUARDO Brown      metoprolol tartrate  25 mg Oral Q12H [address] Светлана Marie MD      ondansetron  4 mg Oral Q6H PRN Светлана Marie MD      pantoprazole  40 mg Oral Early Morning Светлаан Marie MD      polyethylene glycol  17 g Oral Daily PRN Светлана Marie MD      sotalol  80 mg Oral BID Светлана Marie MD      venlafaxine  37 5 mg Oral HS Светлана Marie MD          ** Please Note: Fluency Direct voice to text software may have been used in the creation of this document   **

## 2022-01-17 NOTE — ASSESSMENT & PLAN NOTE
Urinary retention in acute setting  Completed 5 day course of abx for E coli UTI  Bonner placed on 1/7  Discontinued on 1/14 - passed TOV

## 2022-01-17 NOTE — ASSESSMENT & PLAN NOTE
Continue metoprolol tartrate 25mg BID and sotalol 80mg BID for rate control  Continue Eliquis 5mg BID for anticoagulation  Monitor routine VS   Replete electrolytes as needed  Continue to follow-up with Dr Crow Naranjo as outpatient

## 2022-01-17 NOTE — NURSING NOTE
Pt  Refused to be washed this morning  Stated she was "fine " Pt  Is dressed in clothes from yesterday  She stated she did not want to change her outfit  OT here and made aware of same

## 2022-01-17 NOTE — PROGRESS NOTES
01/17/22 1100   Walking 10 Feet on Uneven Surfaces   Type of Assistance Needed Supervision; Adaptive equipment   Comment with RW on indoor ramp   Walking 10 Feet on Uneven Surfaces CARE Score 4   Ambulation   Distance Walked (feet) 300 ft   Findings Due to left knee instability/fatigue and Hx of falls at home with pt reporting left knee buckling, RW is recommended for stability, delayed righting reactions w/o device   Wheelchair mobility   Does the patient use a wheelchair? 0  No   Curb or Single Stair   Style negotiated Curb   Type of Assistance Needed Incidental touching; Adaptive equipment   Physical Assistance Level No physical assistance   Comment with RW on 8" curb   1 Step (Curb) CARE Score 4   4 Steps   Type of Assistance Needed Supervision;Verbal cues   Comment using B HR, attempted reciprocal, but left knee unstable required cueing to perform step to gait with much improved safety   4 Steps CARE Score 4   12 Steps   Comment fatigued afetr 8   Reason if not Attempted Safety concerns   12 Steps CARE Score 88   Stairs   # of Steps 8   Picking Up Object   Type of Assistance Needed Incidental touching   Comment pt bending down w/o use of reacher to  multiple objects throughout session from floor, no LOB   Picking Up Object CARE Score 4   Toilet Transfer   Type of Assistance Needed Supervision   Toilet Transfer CARE Score 4   Therapeutic Interventions   Flexibility manual stretch B HS and calves   Equipment Use   NuStep 8 min at start of session for warmup and neural priming with forced use for last 2 min rmaping SPM up from 35 to 75   Other Comments   Comments SCOTTY stockings donned for edema management per SOULEYMANE with IM   Assessment   Treatment Assessment Pt participating well with only seated rest breaks to allow left LE to recover  Continue to recommend use of RW for balance and safety due to HX of left knee instability and falls   Pt also required cueing on directions on how to return to PT gym from walking in green  Cueing also required on stairs for safety to change from reciprocal to non-reciprocal pattern to prevent left knee buckling  Overall progressing toward goals  Recommendation   Equipment Recommended Walker   PT Therapy Minutes   PT Time In 1030   PT Time Out 1130   PT Total Time (minutes) 60   PT Mode of treatment - Individual (minutes) 60   PT Mode of treatment - Concurrent (minutes) 0   PT Mode of treatment - Group (minutes) 0   PT Mode of treatment - Co-treat (minutes) 0   PT Mode of Treatment - Total time(minutes) 60 minutes   PT Cumulative Minutes 510   Therapy Time missed   Time missed?  No

## 2022-01-17 NOTE — ASSESSMENT & PLAN NOTE
Lab Results   Component Value Date    HGBA1C 5 8 (H) 01/05/2022       Recent Labs     01/16/22  1131 01/16/22  1636 01/16/22 2053 01/17/22  0610   POCGLU 239* 129 166* 144*       Blood Sugar Average: Last 72 hrs:  (P) 811 [id number]     A1c 5 8 on 1/5  Home regimen: metformin 1000mg BID and Tradjenta 5mg daily  Currently on SSI - increased to algorithm 3 on 1/13  Creatinine clearance 35  Started on glimepiride 1mg daily on 1/17  Continue QID accuchecks and cons  carb diet  Continue to follow-up with PCP as outpatient

## 2022-01-17 NOTE — ASSESSMENT & PLAN NOTE
Lab Results   Component Value Date    EGFR 29 01/17/2022    EGFR 28 01/14/2022    EGFR 28 01/12/2022    CREATININE 1 69 (H) 01/17/2022    CREATININE 1 75 (H) 01/14/2022    CREATININE 1 76 (H) 01/12/2022     Creatinine currently 1 69  Baseline appears to be 1 5-1 8  Avoid nephrotoxins  Encourage adequate hydration  Appears euvolemic  Lasix on hold  Continue to trend routine BMP  Previously seen by Dr Gail Reynolds (Nephrology) as an outpatient

## 2022-01-17 NOTE — PROGRESS NOTES
OT Treatment Note       01/17/22 0700   Pain Assessment   Pain Assessment Tool 0-10   Pain Score No Pain   Restrictions/Precautions   Precautions Bed/chair alarms;Cognitive; Fall Risk;Pain   Weight Bearing Restrictions No   ROM Restrictions No   Lifestyle   Autonomy "No, I'll be allergic to the shower today," when offered ADL this morning  Eating   Type of Assistance Needed Independent   Physical Assistance Level No physical assistance   Eating CARE Score 6   Grooming   Findings washing hands standing at sink with SUP   Putting On/Taking Off Footwear   Type of Assistance Needed Supervision   Physical Assistance Level No physical assistance   Comment SUP to don sneakers seated EOB   Putting On/Taking Off Footwear CARE Score 4   Lying to Sitting on Side of Bed   Comment CGA with HOB elevated, vc's for technique   Sit to Stand   Type of Assistance Needed Supervision; Adaptive equipment   Physical Assistance Level No physical assistance   Comment using RW   Sit to Stand CARE Score 4   Bed-Chair Transfer   Type of Assistance Needed Supervision   Physical Assistance Level No physical assistance   Comment using RW   Chair/Bed-to-Chair Transfer CARE Score 4   Toileting Hygiene   Type of Assistance Needed Supervision; Adaptive equipment   Physical Assistance Level No physical assistance   Comment hygiene seated, CS while standing for CM   Toileting Hygiene CARE Score 4   Toilet Transfer   Type of Assistance Needed Supervision; Adaptive equipment   Physical Assistance Level No physical assistance   Comment CS using RW<->BSC over toilet   Toilet Transfer CARE Score 4   Meal Prep   Meal Prep Level Walker  (with folding walker tray)   Meal Prep Level of Assistance Close supervision   Meal Preparation Pt completing activity of making eggs on stovetop this morning  With prompting, pt able to locate pan in cabinets with CS with vc's for using countertop as support   Pt able to cook eggs standing at stovetop with CS, pt demo'ing good safety during task and turning off stove and setting pan aside following task  Though pt abandoning RW when returning to sit in [facility], requiring CS  Pt also provided handout for purchasing options for folding walker tray  Exercise Tools   Other Exercise Tool 1 For increased strength and endurance required for ADLs and functional transfers, pt participating in 1 set seated, 2 sets standing therex using 2#db to complete: chest press, shoulder flexion 0-90*, lat pullbacks, lat raises, and bicep curls; 10x3 reps each completed unilaterally; CS in stance during exercises   Cognition   Overall Cognitive Status Impaired   Arousal/Participation Alert; Cooperative   Attention Attends with cues to redirect   Orientation Level Oriented to person;Oriented to place;Oriented to time;Oriented to situation   Memory Decreased short term memory;Decreased recall of precautions   Following Commands Follows one step commands with increased time or repetition   Additional Activities   Additional Activities Comments Functional mobility household distances with CS using RW   Activity Tolerance   Activity Tolerance Patient tolerated treatment well   Assessment   Treatment Assessment Pt seen for 90 min OT session focusing on functional transfers/mobility, simple meal prep, toileting, and BUE strengthening and activity tolerance  Pt declining shower/spongebath or changing clothes this morning  Pt requiring CS during simple meal prep task, see abover for further details  Pt initially tired this morning, requiring extended time to wake and sit EOB but then tolerated session well  OT to continue POC to continue to progress towards goals  Prognosis Fair   Problem List Decreased strength;Decreased endurance; Impaired balance;Decreased mobility; Decreased cognition; Impaired judgement;Decreased safety awareness   Barriers to Discharge Decreased caregiver support   Plan   Treatment/Interventions ADL retraining;Functional transfer training; Therapeutic exercise; Endurance training;Cognitive reorientation;Patient/family training;Equipment eval/education; Bed mobility; Compensatory technique education   Progress Progressing toward goals   Recommendation   OT Discharge Recommendation Home with home health rehabilitation   OT Therapy Minutes   OT Time In 0700   OT Time Out 0830   OT Total Time (minutes) 90   OT Mode of treatment - Individual (minutes) 90   OT Mode of treatment - Concurrent (minutes) 0   OT Mode of treatment - Group (minutes) 0   OT Mode of treatment - Co-treat (minutes) 0   OT Mode of Treatment - Total time(minutes) 90 minutes   OT Cumulative Minutes 450   Therapy Time missed   Time missed?  No

## 2022-01-17 NOTE — PROGRESS NOTES
01/17/22 1030   Pain Assessment   Pain Assessment Tool 0-10   Pain Score No Pain   Restrictions/Precautions   Precautions Bed/chair alarms; Fall Risk;Supervision on toilet/commode   Subjective   Subjective reports during therapy that left knee "feels weak" during activity, requesting rest break   Sit to Stand   Type of Assistance Needed Supervision   Sit to Stand CARE Score 4   Bed-Chair Transfer   Type of Assistance Needed Supervision; Incidental touching   Comment Sup with RW, CGA without device   Chair/Bed-to-Chair Transfer CARE Score 4   Car Transfer   Reason if not Attempted Environmental limitations   Car Transfer CARE Score 10   Walk 10 Feet   Type of Assistance Needed Incidental touching;Supervision   Comment CGA/Close S w/o device, Sup with RW   Walk 10 Feet CARE Score 4   Walk 50 Feet with Two Turns   Type of Assistance Needed Incidental touching;Supervision   Comment CGA w/o device, Sup with RW   Walk 50 Feet with Two Turns CARE Score 4   Walk 150 Feet   Type of Assistance Needed Physical assistance   Physical Assistance Level 25% or less   Comment HHA, Sup with RW   Walk 150 Feet CARE Score 3   Walking 10 Feet on Uneven Surfaces   Type of Assistance Needed Supervision; Adaptive equipment   Comment with RW on indoor ramp   Walking 10 Feet on Uneven Surfaces CARE Score 4   Ambulation   Does the patient walk? 2  Yes   Distance Walked (feet) 300 ft   Findings Due to left knee instability/fatigue and Hx of falls at home with pt reporting left knee buckling, RW is recommended for stability, delayed righting reactions w/o device   Wheelchair mobility   Does the patient use a wheelchair? 0  No   Curb or Single Stair   Style negotiated Curb   Type of Assistance Needed Incidental touching; Adaptive equipment   Physical Assistance Level No physical assistance   Comment with RW on 8" curb   1 Step (Curb) CARE Score 4   4 Steps   Type of Assistance Needed Supervision;Verbal cues   Comment using B HR, attempted reciprocal, but left knee unstable required cueing to perform step to gait with much improved safety   4 Steps CARE Score 4   12 Steps   Comment fatigued   Reason if not Attempted Safety concerns   12 Steps CARE Score 88   Stairs   # of Steps 8   Picking Up Object   Type of Assistance Needed Incidental touching   Comment pt bending down w/o use of reacher to  multiple objects throughout session from floor, no LOB   Picking Up Object CARE Score 4   Toilet Transfer   Type of Assistance Needed Supervision   Toilet Transfer CARE Score 4   Therapeutic Interventions   Flexibility manual stretch B HS and calves   Equipment Use   NuStep 8 min at start of session for warmup and neural priming with forced use for last 2 min rmaping SPM up from 35 to 75   Other Comments   Comments SCOTTY stockings donned for edema management per SOULEYMANE with IM   Assessment   Treatment Assessment Pt participating well with only seated rest breaks to allow left LE to recover  Continue to recommend use of RW for balance and safety due to HX of left knee instability and falls  Pt also required cueing on directions on how to return to PT gym from walking in green  Cueing also required on stairs for safety to change from reciprocal to non-reciprocal pattern to prevent left knee buckling  Overall progressing toward goals  Recommendation   Equipment Recommended Walker   PT Therapy Minutes   PT Time In 1030   PT Time Out 1130   PT Total Time (minutes) 60   PT Mode of treatment - Individual (minutes) 60   PT Mode of treatment - Concurrent (minutes) 0   PT Mode of treatment - Group (minutes) 0   PT Mode of treatment - Co-treat (minutes) 0   PT Mode of Treatment - Total time(minutes) 60 minutes   PT Cumulative Minutes 510   Therapy Time missed   Time missed?  No

## 2022-01-17 NOTE — ASSESSMENT & PLAN NOTE
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  Currently receiving chemotherapy: Ramucirumab + Docetaxel Q21 days  Will need to restart after discharge from [facility]  Follows with Dr Brandon Moreno (Heme/Onc) as outpatient

## 2022-01-17 NOTE — ASSESSMENT & PLAN NOTE
Stable  Continue Protonix 40mg daily as substitute for non-formulary Prilosec 20mg daily  CC: Anemia History of Present Illness: 	  HPI: The patient is an 83 yo male with Hx. of end stage CKD on hemodialysis and recent GI bleed, who was sent to the ED from [facility] dialysis because of low grade temp and tested positive for COVID 19. 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 He was discharged home on  5/15/20.   On 5/18/20 he developed low grade fever. His daughter and son in low tested positive for COVID 19 and were self  isolating in basement. He tested tested positive for COVID 19 on 519/20. He was dialysed on 5/21/20 at  [facility] in isolation. He had nephrology consult  in the ED. The patient was found with low Hg. of 7 and was also complaining of black stool and tested heme positive in the ED. Referred for admission for GI bleed.    5/24: Sitting in chair, comfortable, having black stool.  No fever, chills, HD stable.  5/25: Sitting in chair, HD stable, awaiting GI intervention.  05/26/20: Patient seen and examined. Had HD and enteroscopy today. He denies any new complaints.   05/27/20: Patient seen and examined. Sitting in a chair.   05/28/20: Patient seen and examined. Family now requesting rehab placement. HD in progress.   05/29/20: Patient seen and examined. No new issues.     REVIEW OF SYSTEMS: All other review of systems is negative unless indicated above.    Vital Signs Last 24 Hrs  T(C): 37 (29 May 2020 11:19), Max: 37.7 (28 May 2020 21:24)  T(F): 98.6 (29 May 2020 11:19), Max: 99.9 (28 May 2020 21:24)  HR: 81 (29 May 2020 11:19) (81 - 105)  BP: 111/67 (29 May 2020 11:19) (106/58 - 124/70)  BP(mean): --  RR: 18 (29 May 2020 11:19) (18 - 19)  SpO2: 92% (29 May 2020 11:19) (92% - 97%)      PHYSICAL EXAM:    Constitutional: NAD, awake and alert, well-developed  HEENT: PERR, EOMI, Normal Hearing, MMM  Neck: Soft and supple  Respiratory: Breath sounds are clear bilaterally, No wheezing, rales or rhonchi  Cardiovascular: S1 and S2, regular rate and rhythm, no Murmurs, gallops or rubs  Gastrointestinal: Bowel Sounds present, soft, nontender, nondistended, no guarding, no rebound  Extremities: No peripheral edema  Neurological: A/O x 3, no focal deficits in my limited exam    MEDICATIONS  (STANDING):  dextrose 5%. 1000 milliLiter(s) (50 mL/Hr) IV Continuous <Continuous>  dextrose 50% Injectable 12.5 Gram(s) IV Push once  dextrose 50% Injectable 25 Gram(s) IV Push once  dextrose 50% Injectable 25 Gram(s) IV Push once  epoetin-shakila-epbx (RETACRIT) Injectable 08797 Unit(s) SubCutaneous every 7 days  famotidine    Tablet 20 milliGRAM(s) Oral daily  insulin lispro (HumaLOG) corrective regimen sliding scale   SubCutaneous three times a day before meals  levothyroxine 75 MICROGram(s) Oral daily  melatonin 5 milliGRAM(s) Oral at bedtime  metoprolol tartrate 25 milliGRAM(s) Oral two times a day  pantoprazole    Tablet 40 milliGRAM(s) Oral before breakfast  sevelamer carbonate 800 milliGRAM(s) Oral three times a day  tamsulosin 0.4 milliGRAM(s) Oral at bedtime    MEDICATIONS  (PRN):  dextrose 40% Gel 15 Gram(s) Oral once PRN Blood Glucose LESS THAN 70 milliGRAM(s)/deciliter  glucagon  Injectable 1 milliGRAM(s) IntraMuscular once PRN Glucose LESS THAN 70 milligrams/deciliter                              10.3   8.03  )-----------( 169      ( 28 May 2020 08:31 )             32.4     28 May 2020 08:31    128    |  94     |  60     ----------------------------<  99     3.8     |  22     |  6.79     Ca    8.2        28 May 2020 08:31  Phos  5.2       28 May 2020 08:31    TPro  x      /  Alb  2.3    /  TBili  x      /  DBili  x      /  AST  x      /  ALT  x      /  AlkPhos  x      28 May 2020 08:31    LIVER FUNCTIONS - ( 28 May 2020 08:31 )  Alb: 2.3 g/dL / Pro: x     / ALK PHOS: x     / ALT: x     / AST: x     / GGT: x             CAPILLARY BLOOD GLUCOSE      POCT Blood Glucose.: 128 mg/dL (28 May 2020 09:43)  POCT Blood Glucose.: 134 mg/dL (27 May 2020 21:56)          Assessment and Plan:  83 yo male with Hx. of end stage CKD on hemodialysis and recent GI bleed, who was sent to the ED from [facility] because of low grade temp and tested positive for COVID 19. Found to have black stool concerning for acute GI bleed.    Acute blood loss anemia / GI bleed / concerning recurrent AVM bleed:  S/P enteroscopy: jejunal bleeding AVM, cauterized  -s/p 1u PRBC  -Monitor H+H, stable  -protonix/pepcid  -hold aspirin and eliquis  -GI eval appreciated     COVID 19 infection: Asymptomatic  -chest xray no significant findings, slight left effusion  -Monitor clinically    ESRD:  -c/w HD  -epo    Chronic afib:  -hold aspirin and eliquis in setting of GI bleed  -cardio eval appreciated    DM2:  -A1c 5.2  -stop RAISS  -FS testing BID  -EPO      VTEP:   -venodynes    Dispo:  Home in 1 or 2 days time.   Kids are also with COVID positive

## 2022-01-17 NOTE — PLAN OF CARE
Problem: Potential for Falls  Goal: Patient will remain free of falls  Description: INTERVENTIONS:  - Educate patient/family on patient safety including physical limitations  - Instruct patient to call for assistance with activity   - Consult OT/PT to assist with strengthening/mobility   - Keep Call bell within reach  - Keep bed low and locked with side rails adjusted as appropriate  - Keep care items and personal belongings within reach  - Initiate and maintain comfort rounds  - Make Fall Risk Sign visible to staff  - Offer Toileting every 2 Hours, in advance of need  - Initiate/Maintain bed/chair alarm  - Obtain necessary fall risk management equipment  - Apply yellow socks and bracelet for high fall risk patients  - Consider moving patient to room near nurses station  Outcome: Progressing

## 2022-01-18 ENCOUNTER — PATIENT OUTREACH (OUTPATIENT)
Dept: CASE MANAGEMENT | Facility: OTHER | Age: 72
End: 2022-01-18

## 2022-01-18 LAB
GLUCOSE SERPL-MCNC: 119 MG/DL (ref 65–140)
GLUCOSE SERPL-MCNC: 137 MG/DL (ref 65–140)
GLUCOSE SERPL-MCNC: 163 MG/DL (ref 65–140)
GLUCOSE SERPL-MCNC: 199 MG/DL (ref 65–140)

## 2022-01-18 PROCEDURE — 97116 GAIT TRAINING THERAPY: CPT

## 2022-01-18 PROCEDURE — 97130 THER IVNTJ EA ADDL 15 MIN: CPT

## 2022-01-18 PROCEDURE — 82948 REAGENT STRIP/BLOOD GLUCOSE: CPT

## 2022-01-18 PROCEDURE — 99232 SBSQ HOSP IP/OBS MODERATE 35: CPT | Performed by: PHYSICAL MEDICINE & REHABILITATION

## 2022-01-18 PROCEDURE — 97110 THERAPEUTIC EXERCISES: CPT

## 2022-01-18 PROCEDURE — 99232 SBSQ HOSP IP/OBS MODERATE 35: CPT | Performed by: INTERNAL MEDICINE

## 2022-01-18 PROCEDURE — 97530 THERAPEUTIC ACTIVITIES: CPT

## 2022-01-18 PROCEDURE — 97129 THER IVNTJ 1ST 15 MIN: CPT

## 2022-01-18 PROCEDURE — 97535 SELF CARE MNGMENT TRAINING: CPT

## 2022-01-18 RX ADMIN — VENLAFAXINE HYDROCHLORIDE 37.5 MG: 37.5 CAPSULE, EXTENDED RELEASE ORAL at 21:05

## 2022-01-18 RX ADMIN — APIXABAN 5 MG: 5 TABLET, FILM COATED ORAL at 08:40

## 2022-01-18 RX ADMIN — CYANOCOBALAMIN TAB 1000 MCG 500 MCG: 1000 TAB at 08:39

## 2022-01-18 RX ADMIN — FOLIC ACID 1 MG: 1 TABLET ORAL at 08:40

## 2022-01-18 RX ADMIN — SOTALOL HYDROCHLORIDE 80 MG: 80 TABLET ORAL at 08:40

## 2022-01-18 RX ADMIN — BUSPIRONE HYDROCHLORIDE 7.5 MG: 15 TABLET ORAL at 08:40

## 2022-01-18 RX ADMIN — APIXABAN 5 MG: 5 TABLET, FILM COATED ORAL at 17:51

## 2022-01-18 RX ADMIN — PANTOPRAZOLE SODIUM 40 MG: 40 TABLET, DELAYED RELEASE ORAL at 06:01

## 2022-01-18 RX ADMIN — METOPROLOL TARTRATE 25 MG: 25 TABLET, FILM COATED ORAL at 20:59

## 2022-01-18 RX ADMIN — LORATADINE 10 MG: 10 TABLET ORAL at 08:40

## 2022-01-18 RX ADMIN — LOSARTAN POTASSIUM 25 MG: 25 TABLET, FILM COATED ORAL at 08:40

## 2022-01-18 RX ADMIN — SOTALOL HYDROCHLORIDE 80 MG: 80 TABLET ORAL at 17:51

## 2022-01-18 RX ADMIN — ATORVASTATIN CALCIUM 80 MG: 80 TABLET, FILM COATED ORAL at 17:51

## 2022-01-18 RX ADMIN — ASPIRIN 81 MG: 81 TABLET, COATED ORAL at 08:39

## 2022-01-18 RX ADMIN — Medication 2000 UNITS: at 08:40

## 2022-01-18 RX ADMIN — SITAGLIPTIN 25 MG: 25 TABLET, FILM COATED ORAL at 08:40

## 2022-01-18 RX ADMIN — INSULIN LISPRO 2 UNITS: 100 INJECTION, SOLUTION INTRAVENOUS; SUBCUTANEOUS at 12:14

## 2022-01-18 RX ADMIN — BUSPIRONE HYDROCHLORIDE 7.5 MG: 15 TABLET ORAL at 20:59

## 2022-01-18 RX ADMIN — METOPROLOL TARTRATE 25 MG: 25 TABLET, FILM COATED ORAL at 08:39

## 2022-01-18 NOTE — PROGRESS NOTES
01/18/22 0700   Pain Assessment   Pain Score No Pain   Restrictions/Precautions   Precautions Bed/chair alarms;Cognitive; Fall Risk;Supervision on toilet/commode   Weight Bearing Restrictions No   ROM Restrictions No   Shower/Bathe Self   Type of Assistance Needed Supervision   Physical Assistance Level No physical assistance   Comment completed shower this session  pt ableto wash 10/10 parts with xleg method to wash feet  S in stance when washing carolyn/buttock area  Shower/Bathe Self CARE Score 4   Bathing   Assessed Bath Style Shower   Anticipated D/C Bath Style Shower   Able to Imtiaz Anish No   Able to [employer] Temperature No   Able to Wash/Rinse/Dry (body part) Left Arm;Right Arm;L Upper Leg;R Upper Leg;L Lower Leg/Foot;R Lower Leg/Foot;Chest;Perineal Area; Abdomen;Buttocks   Limitations Noted in Balance; Endurance; Safety;Strength;Timeliness   Positioning Seated;Standing   Adaptive Equipment Shower Bars; Shower Seat;Hand [employer]   Limitations Noted In Balance; Safety   Adaptive Equipment Grab Bars;Seat with Back   Assessed Shower   Findings S for shower transfer with use of Gb and shower chiar  pt reports she has both at home   Upper Body Dressing   Type of Assistance Needed Supervision   Physical Assistance Level No physical assistance   Comment seated   Upper Body Dressing CARE Score 4   Lower Body Dressing   Type of Assistance Needed Supervision   Physical Assistance Level No physical assistance   Comment seated pt able to don brief/pants   req vc for don RLE first, S for CM in stance   Lower Body Dressing CARE Score 4   Putting On/Taking Off Footwear   Type of Assistance Needed Physical assistance   Physical Assistance Level 51%-75%   Comment TA to don ROBERT, able to don sneakers   Putting On/Taking Off Footwear CARE Score 2   Lying to Sitting on Side of Bed   Type of Assistance Needed Supervision   Physical Assistance Level No physical assistance   Comment S for supine to sit, inc time to arouse and sit EOB   Lying to Sitting on Side of Bed CARE Score 4   Sit to Stand   Type of Assistance Needed Supervision; Adaptive equipment   Physical Assistance Level No physical assistance   Comment S with Rw   Sit to Stand CARE Score 4   Bed-Chair Transfer   Type of Assistance Needed Supervision   Physical Assistance Level No physical assistance   Comment S with Rw   Chair/Bed-to-Chair Transfer CARE Score 4   Toileting Hygiene   Type of Assistance Needed Supervision; Adaptive equipment   Physical Assistance Level No physical assistance   Comment S with Rw   Toileting Hygiene CARE Score 4   Toilet Transfer   Type of Assistance Needed Supervision; Adaptive equipment   Physical Assistance Level No physical assistance   Comment S with Rw   Toilet Transfer CARE Score 4   Therapeutic Excerise-Strength   UE Strength Yes   Right Upper Extremity- Strength   R Shoulder Flexion;ABduction; Extension;Horizontal ABduction; External rotation; Internal rotation   R Elbow Elbow flexion;Elbow extension   R Position Seated   Equipment Dumbbell  (3#)   R Weight/Reps/Sets 2x15   RUE Strength Comment engaged pt in UE TE with 3# 2x15 to inc fx strength  tolerated wellwith rest break in between  Left Upper Extremity-Strength   LUE Strength Comment see above   Cognition   Overall Cognitive Status Impaired   Arousal/Participation Alert; Cooperative   Attention Attends with cues to redirect   Orientation Level Oriented to person;Oriented to place;Oriented to situation;Disoriented to time   Memory Decreased short term memory;Decreased recall of precautions   Following Commands Follows one step commands with increased time or repetition   Activity Tolerance   Activity Tolerance Patient tolerated treatment well   Medical Staff Made Aware /70 HR 75   Assessment   Treatment Assessment Engaged pt in 90mins of skilled OT services with focus on self-care and UE TE  Pt completing ADLs at S level with no LOB observed   Possibility to set DC date today as dtr ablet o provide 24/7 supervision at [address] [place] [address] OT POC with focus on activity tolerance, balance, UE TE, safety inc fx performance and dec caregiver burden  Prognosis Fair   Problem List Decreased strength;Decreased endurance; Impaired balance;Decreased mobility; Decreased cognition; Impaired judgement;Decreased safety awareness   Plan   Treatment/Interventions ADL retraining;Functional transfer training; Therapeutic exercise; Endurance training;Patient/family training;Bed mobility; Compensatory technique education   Progress Progressing toward goals   Recommendation   OT Discharge Recommendation Home with home health rehabilitation   Equipment Recommended Shower/Tub chair with back ($)   OT Equipment ordered pt has shower chair and GB in shower   OT Therapy Minutes   OT Time In 0700   OT Time Out 0830   OT Total Time (minutes) 90   OT Mode of treatment - Individual (minutes) 90   OT Mode of treatment - Concurrent (minutes) 0   OT Mode of treatment - Group (minutes) 0   OT Mode of treatment - Co-treat (minutes) 0   OT Mode of Treatment - Total time(minutes) 90 minutes   OT Cumulative Minutes 570   Therapy Time missed   Time missed?  No

## 2022-01-18 NOTE — PLAN OF CARE
Problem: Potential for Falls  Goal: Patient will remain free of falls  Description: INTERVENTIONS:  - Educate patient/family on patient safety including physical limitations  - Instruct patient to call for assistance with activity   - Consult OT/PT to assist with strengthening/mobility   - Keep Call bell within reach  - Keep bed low and locked with side rails adjusted as appropriate  - Keep care items and personal belongings within reach  - Initiate and maintain comfort rounds  - Make Fall Risk Sign visible to staff  - Offer Toileting every 2 Hours, in advance of need  - Initiate/Maintain bed/chair alarms  - Apply yellow socks and bracelet for high fall risk patients  - Consider moving patient to room near nurses station  Outcome: Progressing

## 2022-01-18 NOTE — ASSESSMENT & PLAN NOTE
Lab Results   Component Value Date    EGFR 29 01/17/2022    EGFR 28 01/14/2022    EGFR 28 01/12/2022    CREATININE 1 69 (H) 01/17/2022    CREATININE 1 75 (H) 01/14/2022    CREATININE 1 76 (H) 01/12/2022     Creatinine currently 1 69  Baseline appears to be 1 5-1 8  Avoid nephrotoxins  Encourage adequate hydration  Appears euvolemic  Lasix on hold  Continue to trend routine BMP  Previously seen by Dr Debi Holter (Nephrology) as an outpatient

## 2022-01-18 NOTE — ASSESSMENT & PLAN NOTE
Lab Results   Component Value Date    HGBA1C 5 8 (H) 01/05/2022       Recent Labs     01/17/22  0610 01/17/22  1142 01/17/22  1608 01/18/22  0557   POCGLU 144* 264* 109 119       Blood Sugar Average: Last 72 hrs:  (P) 531 [id number]     A1c 5 8 on 1/5  Home regimen: metformin 1000mg BID and Tradjenta 5mg daily  Currently on SSI - increased to algorithm 3 on 1/13  Creatinine clearance 35  Hold on metformin  Started on Januvia 25mg daily on 1/18 - transition to [address] at discharge  Continue QID accuchecks and cons  carb diet  Continue to follow-up with PCP as outpatient

## 2022-01-18 NOTE — PROGRESS NOTES
[facility]  Progress Note Ada Sevilla 1950, 66 y o  female MRN: [id number]  Unit/Bed#: -57 Encounter: [id number]  Primary Care Provider: Jamil Barajas DO   Date and time admitted to hospital: 1/11/2022  3:39 PM    Lethargy  Assessment & Plan  Difficulty maintaining conversation on 1/12 - falling asleep multiple times during exam   Admits to snoring - overnight noc ox ordered for 1/12 - lowest saturation 88% for 1 minute  Does not qualify for oxygen  CT head on 1/12: "No new intracranial abnormality  Continued evolution of late subacute to chronic right basal ganglia infarct  No hemorrhage or significant mass effect "  Frequent neuro-checks  Per family, pt has been experiencing more daytime sleepiness since starting chemo  Dyslipidemia  Assessment & Plan  Started on Lipitor 80mg daily in acute setting - continue  Lipid panel on 1/5: Cholesterol 201, Triglycerides 252, HDL 41,   GERD (gastroesophageal reflux disease)  Assessment & Plan  Stable  Continue Protonix 40mg daily as substitute for non-formulary Prilosec 20mg daily  Atrial fibrillation (HCC)  Assessment & Plan  Continue metoprolol tartrate 25mg BID and sotalol 80mg BID for rate control  Continue Eliquis 5mg BID for anticoagulation  Monitor routine VS   Replete electrolytes as needed  Continue to follow-up with Dr Akua Yi as outpatient  Depression  Assessment & Plan  Stable  Continue Buspar 7 5mg Q12 and Effexor 37 5mg daily  Supportive counseling as needed  Follows with Palliative as outpatient  Diabetes type 2, controlled [facility])  Assessment & Plan  Lab Results   Component Value Date    HGBA1C 5 8 (H) 01/05/2022       Recent Labs     01/17/22  0610 01/17/22  1142 01/17/22  1608 01/18/22  0557   POCGLU 144* 264* 109 119       Blood Sugar Average: Last 72 hrs:  (P) 158 [id number]     A1c 5 8 on 1/5  Home regimen: metformin 1000mg BID and Tradjenta 5mg daily    Currently on SSI - increased to algorithm 3 on 1/13  Creatinine clearance 35  Hold on metformin  Started on Januvia 25mg daily on 1/18 - transition to [address] at discharge  Continue QID accuchecks and cons  carb diet  Continue to follow-up with PCP as outpatient  Hypertension  Assessment & Plan  Continue home regimen of Losartan 50mg daily and metoprolol tartrate 25mg BID  Started on amlodipine 5mg BID in acute setting - continue at this time  Started on Lasix 40mg daily in acute setting - only taking as needed at home  Currently on hold  Losartan restarted on 1/11 - decreased dose to 25mg daily on 1/12 due to lethargy/possible hypoperfusion  Decreased amlodipine to 5mg at HS on 1/14  Discontinued on 1/18  Monitor BP with VS   Holding parameters for SBP <130  Continue to follow-up with Dr Taylor Lassiter as outpatient  Chronic kidney disease, stage 3 [facility])  Assessment & Plan  Lab Results   Component Value Date    EGFR 29 01/17/2022    EGFR 28 01/14/2022    EGFR 28 01/12/2022    CREATININE 1 69 (H) 01/17/2022    CREATININE 1 75 (H) 01/14/2022    CREATININE 1 76 (H) 01/12/2022     Creatinine currently 1 69  Baseline appears to be 1 5-1 8  Avoid nephrotoxins  Encourage adequate hydration  Appears euvolemic  Lasix on hold  Continue to trend routine BMP  Previously seen by Dr Harmony Salter (Nephrology) as an outpatient  Lung cancer 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)  Assessment & Plan  1/3 - admitted with AMS and multiple falls  CT head on 1/3: No acute intracranial abnormalities  MRI brain on 1/4: R basal ganglia and R caudate acute ischemia  MRI brain with contrast on 1/5: No enhancing lesions  Repeat CT head on 1/6: Evolving R basal ganglia infarct  No hemorrhage or significant mass effect  ECHO on  - EF 60% with grade 1 relaxation  EEG on  - Non-specific  Continue Lipitor 80mg daily and ASA 81mg daily  Likely related to small vessel disease  Maintain normotension  Neurovascular checks Q shift  Follow-up with Neurology as outpatient  PT/OT/Speech therapies  Primary team following  VTE Pharmacologic Prophylaxis:   Pharmacologic: Apixaban (Eliquis)  Mechanical VTE Prophylaxis in Place: Yes - sequential compression devices  Current Length of Stay: 7 day(s)    Current Patient Status: Inpatient Rehab     Discharge Plan: As per primary team     Code Status: Level 3 - DNAR and DNI    Subjective:   Pt examined while pt lying in bed in pt room  Currently has no complaints  Slept well last night  Feels that therapy is going well  Eating well  Had a BM yesterday with no issues  Possible discharge later this week  Has no questions or concerns at this time  Objective:     Vitals:   Temp (24hrs), Av 1 °F (36 2 °C), Min:96 5 °F (35 8 °C), Max:97 7 °F (36 5 °C)    Temp:  [96 5 °F (35 8 °C)-97 7 °F (36 5 °C)] 97 2 °F (36 2 °C)  HR:  [58-75] 68  Resp:  [18] 18  BP: (111-140)/(58-80) 138/80  SpO2:  [94 %-95 %] 95 %  Body mass index is 28 34 kg/m²  Review of Systems   Constitutional: Negative for appetite change, chills, fatigue and fever  Respiratory: Negative for cough and shortness of breath  Cardiovascular: Negative for chest pain, palpitations and leg swelling  Gastrointestinal: Negative for abdominal distention, abdominal pain, constipation, diarrhea, nausea and vomiting  LBM    Genitourinary: Negative for difficulty urinating  Musculoskeletal: Negative for arthralgias and back pain  Neurological: Negative for dizziness, weakness, light-headedness, numbness and headaches  Psychiatric/Behavioral: Negative for sleep disturbance  Input and Output Summary (last 24 hours):        Intake/Output Summary (Last 24 hours) at 1/18/2022 0945  Last data filed at 1/18/2022 0725  Gross per 24 hour   Intake 490 ml   Output 1000 ml   Net -510 ml       Physical Exam:     Physical Exam  Vitals and nursing note reviewed  Constitutional:       Appearance: Normal appearance  HENT:      Head: Normocephalic and atraumatic  Cardiovascular:      Rate and Rhythm: Normal rate and regular rhythm  Pulses: Normal pulses  Heart sounds: Normal heart sounds  No murmur heard  No friction rub  Pulmonary:      Effort: Pulmonary effort is normal  No respiratory distress  Breath sounds: Normal breath sounds  No wheezing or rhonchi  Abdominal:      General: Abdomen is flat  Bowel sounds are normal  There is no distension  Palpations: Abdomen is soft  Tenderness: There is no abdominal tenderness  Musculoskeletal:      Cervical back: Normal range of motion and neck supple  Right lower leg: Edema (Minimal non-pitting edema) present  Left lower leg: Edema (Minimal non-pitting edema) present  Skin:     General: Skin is warm and dry  Capillary Refill: Capillary refill takes less than 2 seconds  Neurological:      Mental Status: She is alert and oriented to person, place, and time     Psychiatric:         Mood and Affect: Mood normal          Behavior: Behavior normal          Additional Data:     Labs:    Results from last 7 days   Lab Units 01/17/22  0603   WBC Thousand/uL 7 80   HEMOGLOBIN g/dL 10 6*   HEMATOCRIT % 32 5*   PLATELETS Thousands/uL 233   NEUTROS PCT % 72*   LYMPHS PCT % 14*   MONOS PCT % 9   EOS PCT % 4     Results from last 7 days   Lab Units 01/17/22  0603   SODIUM mmol/L 137   POTASSIUM mmol/L 4 0   CHLORIDE mmol/L 107   CO2 mmol/L 27   BUN mg/dL 34*   CREATININE mg/dL 1 69*   ANION GAP mmol/L 3*   CALCIUM mg/dL 8 2*   GLUCOSE RANDOM mg/dL 150*         Results from last 7 days   Lab Units 01/18/22  0557 01/17/22  1608 01/17/22  1142 01/17/22  0610 01/16/22  2053 01/16/22  1636 01/16/22  1131 01/16/22  0637 01/15/22  2044 01/15/22  1625 01/15/22  1130 01/15/22  0611   POC GLUCOSE mg/dl 119 109 264* 144* 166* 129 239* 151* 157* 140 312* 153*               Labs reviewed    Imaging:    Imaging reviewed    Recent Cultures (last 7 days):           Last 24 Hours Medication List:   Current Facility-Administered Medications   Medication Dose Route Frequency Provider Last Rate    acetaminophen  650 mg Oral Q6H PRN Venson Kocher, MD      apixaban  5 mg Oral BID Venson Kocher, MD      aspirin  81 mg Oral Daily Venson Kocher, MD      atorvastatin  80 mg Oral Daily With John Bergeron MD      bisacodyl  10 mg Rectal Daily PRN Venson Kocher, MD      busPIRone  7 5 mg Oral BID Venson Kocher, MD      cholecalciferol  2,000 Units Oral Daily Venson Kocher, MD      cyanocobalamin  500 mcg Oral Daily Venson Kocher, MD      Diclofenac Sodium  2 g Topical TID Venson Kocher, MD      docusate sodium  100 mg Oral BID PRN Venson Kocher, MD      folic acid  1 mg Oral Daily Venson Kocher, MD      insulin lispro  1-6 Units Subcutaneous TID AC Geroge GeneEDUARDO haley      loratadine  10 mg Oral Daily Venson Kocher, MD      losartan  25 mg Oral Daily Geroge GeneEDUARDO haley      metoprolol tartrate  25 mg Oral Q12H [address] Venson Kocher, MD      ondansetron  4 mg Oral Q6H PRN Venson Kocher, MD      pantoprazole  40 mg Oral Early Morning Venson Kocher, MD      polyethylene glycol  17 g Oral Daily PRN Venson Kocher, MD      sitaGLIPtin  25 mg Oral Daily Rupal Cee MD      sotalol  80 mg Oral BID Venson Kocher, MD      venlafaxine  37 5 mg Oral HS Venson Kocher, MD          M*Modal software was used to dictate this note  It may contain errors with dictating incorrect words or incorrect spelling  Please contact the provider directly with any questions

## 2022-01-18 NOTE — PLAN OF CARE
Problem: PAIN - ADULT  Goal: Verbalizes/displays adequate comfort level or baseline comfort level  Description: Interventions:  - Encourage patient to monitor pain and request assistance  - Assess pain using appropriate pain scale  - Administer analgesics based on type and severity of pain and evaluate response  - Implement non-pharmacological measures as appropriate and evaluate response  - Consider cultural and social influences on pain and pain management  - Notify physician/advanced practitioner if interventions unsuccessful or patient reports new pain  Outcome: Progressing     Problem: SAFETY ADULT  Goal: Patient will remain free of falls  Description: INTERVENTIONS:  - Educate patient/family on patient safety including physical limitations  - Instruct patient to call for assistance with activity   - Consult OT/PT to assist with strengthening/mobility   - Keep Call bell within reach  - Keep bed low and locked with side rails adjusted as appropriate  - Keep care items and personal belongings within reach  - Initiate and maintain comfort rounds  - Make Fall Risk Sign visible to staff  - Maintain bed and chair alarm  - Apply yellow socks and bracelet for high fall risk patients  - Consider moving patient to room near nurses station  Outcome: Progressing

## 2022-01-18 NOTE — PROGRESS NOTES
Chart review complete no LCD at this time  Per team conference on 1/13/22 the patient is functioning at the current levels:    Physical Therapy:     Weight Bearing Status: Full Weight Bearing  Transfers: Minimal Assistance  Bed Mobility: Minimal Assistance  Amulation Distance (ft): 90 feet  Ambulation: Minimal Assistance  Assistive Device for Ambulation: Roller Walker  Number of Stairs: 4  Assistive Device for Stairs: Bilateral Hand Rails  Stair Assistance: Minimal Assistance  Ramp:  (TBA)  Discharge Recommendations: Home with:  [address] with[de-identified] Home Physical Therapy     Occupational Therapy:  Eating:  (setup)  Grooming: Supervision  Bathing: Minimal Assistance  Bathing: Minimal Assistance  Upper Body Dressing: Supervision  Lower Body Dressing: Moderate Assistance  Toileting: Minimal Assistance  Tub/Shower Transfer: Moderate Assistance  Toilet Transfer: Minimal Assistance  Cognition: Exceptions to WNL  Cognition: Decreased Memory,Decreased Executive Functions,Decreased Safety  Orientation: Person,Place  Discharge Recommendations: Home with:  [address] with[de-identified] Home Occupational Therapy,First Floor Setup (S vs [place] pending functioanl performance and cog)    This care manager assistant will continue to monitor via chart review throughout bundle episode

## 2022-01-18 NOTE — PROGRESS NOTES
01/18/22 1000   Pain Assessment   Pain Score No Pain   Restrictions/Precautions   Precautions Bed/chair alarms;Cognitive; Fall Risk;Impulsive;Supervision on toilet/commode   Weight Bearing Restrictions No   ROM Restrictions No   Picking Up Object   Type of Assistance Needed Supervision; Adaptive equipment   Physical Assistance Level No physical assistance   Comment placed various cones throughout ARC and had pt retreive with use of RW, folding walker tray and LHR  Completed at S level with vc for safety and getting closer to cones, no LOB   Picking Up Object CARE Score 4   Sit to Lying   Type of Assistance Needed Supervision   Physical Assistance Level No physical assistance   Comment pt requesting to get bakc into bed end of session, S with no use of BR and HOB flat   Sit to Lying CARE Score 4   Sit to Stand   Type of Assistance Needed Supervision; Adaptive equipment   Physical Assistance Level No physical assistance   Comment S with RW   Sit to Stand CARE Score 4   Bed-Chair Transfer   Type of Assistance Needed Supervision   Physical Assistance Level No physical assistance   Chair/Bed-to-Chair Transfer CARE Score 4   Toileting Hygiene   Type of Assistance Needed Supervision   Physical Assistance Level No physical assistance   Comment S with RW   Toileting Hygiene CARE Score 4   Toilet Transfer   Type of Assistance Needed Supervision   Physical Assistance Level No physical assistance   Comment S with RW   Toilet Transfer CARE Score 4   Toilet Transfer   Surface Assessed Standard Commode   Transfer Technique Standard   Limitations Noted In Balance; Endurance;UE Strength;LE Strength   Adaptive Equipment Walker   Cognition   Overall Cognitive Status Impaired   Arousal/Participation Alert; Cooperative   Attention Attends with cues to redirect   Orientation Level Oriented to person;Oriented to place;Oriented to situation;Disoriented to time   Memory Decreased short term memory;Decreased recall of precautions   Following Commands Follows one step commands with increased time or repetition   Activity Tolerance   Activity Tolerance Patient tolerated treatment well   Medical Staff Made Aware BP  133/63 HR63   Assessment   Treatment Assessment Engaged pt in brief 30mins of skilled OT services with focus on fx mobiltiy with item retrieval from floor and toileting  Pt completed cone course and then requesting to go to [facility] completing toileting at S level  Pt did void and have BM, charted in I/O  Pt tolerated tx session well, cont OT POC to cont to work on fx mobility, activity tolerance, safety to ensure safe DC home and dec caregiver burden  Prognosis Fair   Problem List Decreased strength;Decreased endurance; Impaired balance;Decreased mobility; Decreased cognition; Impaired judgement;Decreased safety awareness   Plan   Treatment/Interventions ADL retraining;Functional transfer training; Therapeutic exercise; Endurance training;Patient/family training;Bed mobility; Compensatory technique education   Progress Progressing toward goals   Recommendation   OT Discharge Recommendation Home with home health rehabilitation   Equipment Recommended Shower/Tub chair with back ($)   OT Equipment ordered pt has shower chair   OT Therapy Minutes   OT Time In 1000   OT Time Out 1030   OT Total Time (minutes) 30   OT Mode of treatment - Individual (minutes) 30   OT Mode of treatment - Concurrent (minutes) 0   OT Mode of treatment - Group (minutes) 0   OT Mode of treatment - Co-treat (minutes) 0   OT Mode of Treatment - Total time(minutes) 30 minutes   OT Cumulative Minutes 600   Therapy Time missed   Time missed?  No

## 2022-01-18 NOTE — ASSESSMENT & PLAN NOTE
Continue metoprolol tartrate 25mg BID and sotalol 80mg BID for rate control  Continue Eliquis 5mg BID for anticoagulation  Monitor routine VS   Replete electrolytes as needed  Continue to follow-up with Dr Fazal Jaramillo as outpatient

## 2022-01-18 NOTE — ASSESSMENT & PLAN NOTE
Continue home regimen of Losartan 50mg daily and metoprolol tartrate 25mg BID  Started on amlodipine 5mg BID in acute setting - continue at this time  Started on Lasix 40mg daily in acute setting - only taking as needed at home  Currently on hold  Losartan restarted on 1/11 - decreased dose to 25mg daily on 1/12 due to lethargy/possible hypoperfusion  Decreased amlodipine to 5mg at HS on 1/14  Discontinued on 1/18  Monitor BP with VS   Holding parameters for SBP <130  Continue to follow-up with Dr Akua Yi as outpatient

## 2022-01-18 NOTE — PROGRESS NOTES
Physical Medicine and Rehabilitation Progress Note  Abelino Stafford 66 y o  female MRN: [id number]  Unit/Bed#: -99 Encounter: [id number]    HPI: Abelino Stafford is a 66 y o  female with known stage 4 juan cancer undergoing chemotherapy every 3 weeks, A fib on Eliquis, HTN, HLD, CHF (grade 2 DD), GERD, CKD3, DM2, bilateral knee osteoarthritis, depression/anxiety who presented to the [facility] on 1/3/22 due to 4 falls and leg contusion  Patient found to have a right buttock hematoma which remained stable  Patient developed altered mental status  CTH negative  MRI brain ultimately revealed a acute right basal ganglia ischemic CVA  MRA/MRV negative for occlusive disease  MRI brain with contrast negative for any enhancing lesions  Patient seen by Neurology and determined etiology of CVA small vessel disease  Aspirin was added to her secondary CVA ppx  Course c/b by UTI (s/p abx), urinary retention (bahena placed), diarrhea (Resolved)  Admitted to [facility] on 1/11  Chief Complaint: No new issues today  Interval History/Subjective:  No acute events overnight  Last BM was 1/17  Has been refusing voltaren gel, and did not meet parameters for amlodipine  Denies any new pain, CP, SOB, fevers, chills, N/V, abdominal pain  ROS:  A 10 point review of systems was negative except for what is noted in the HPI  Today's Changes:  1  T2DM: monitor changes in Januvia today  2  Making voltaren PRN  3  Has not received amlodipine past couple days due to parameters, discontinued today  Monitor BP  Assessment/Plan:    * Acute ischemic stroke [facility])  Assessment & Plan  Presented with 4 falls and mental status change  MRI brain confirmed a right basal ganglia and right caudate ischemic CVA  Etiology: Small vessel disease most likely     Secondary CVA ppx: managed on Eliquis 5mg BID, Lipitor 80mg daily, and now aspirin 81mg daily  Continue CVA education while at [address] [place] [address] education and reduction of modifiable risk factors  Monitor mood  Follow-up with Neurology as outpatient     Lethargy  Assessment & Plan  Unclear etiology, has since resolved  MOCA early in stay was 8/30, but will recheck  Neuropathy due to chemotherapeutic drug [facility])  Assessment & Plan  Not painful  Consider topical lidoderm patch or gabapentin if pain develops    GERD (gastroesophageal reflux disease)  Assessment & Plan  Managed on Protonix (therapeutic exchange for Prilosec)    Diastolic congestive heart failure (HCC)  Assessment & Plan  Grade I DD on echocardiogram  Lasix on HOLD due to ADITYA  Patient euvolemic, continue to monitor  Continue beta blockers, ARB  IM consultants following and managing   Follows with Dr Jose Raul Beasley glass opacity present on imaging of lung  Assessment & Plan  Found incidentally on CT Chest  COVID - 19 negative   PCP to follow as outpatient     Atrial fibrillation [facility])  Assessment & Plan  Rate/rhythm controlled on Lopressor 25mg Q12hrs and Sotolol 80mg BID  Anticoagulated on Eliquis 5mg BID  Stable  IM consultants following while at [place] [address] with Dr Risa Moctezuma on home regimen of Buspar 7 5mg BID and Effexor XR 37 5 QHS  Mood is stable  Consult neuropsychology if needed    Diabetes type 2, controlled [facility])  Assessment & Plan  Lab Results   Component Value Date    HGBA1C 5 8 (H) 01/05/2022     At home was on Metformin and Tradjenta  Here: [facility] not formulary  Januvia with plan to transition to home Tradjenta at discharge  Continue CCI, diabetic diet, accuchecks  Nutrition consulted   IM consultants following and managing     Hypertension  Assessment & Plan  Managed here on Lopressor 25mg Q12h, Losartan 25mg daily  Lasix on HOLD  Amlodipine discontinued due to soft BP     BP stable   Follows with Dr Alysia Anderson    Chronic kidney disease, stage 3 [facility])  Assessment & Plan  ADITYA on CKD detail I  Baseline creatinine 1 5-1 8  Cr = 1 69  Lasix on HOLD as patient is euvolemic  Avoid nephrotoxic meds, monitor volume status  Losartan reduced from 50mg daily to 25mg daily by IM consultants    Lung cancer [facility])  Assessment & Plan  Known stage 4 adenocarcinoma of lung   · Follows with Dr Eugenia Zarate  · [facility] Chemotherapy every 3 weeks  Last infusion was 12/27/21  Missed her 1/7/22 infusion due to hospitalization  · Follow-up with Dr Eugenia Zarate as outpatient   CT Chest: Scattered groundglass opacities in both lungs which are new from prior exam   Consider infectious or inflammatory etiologies including Covid viral pneumonia  Previous right lower lobe lung mass has diminished since July  Previous left upper lobe tumor has also decreased in size but appears more atelectatic  Increasing water attenuation pleural fluid, partially loculated pleural fluid in the left lower lung  Follow-up per cancer imaging protocol  Contusion of left lower leg  Assessment & Plan  Sustained during fall  X-ray of right ankle is negative for fracture, however showing linear calcifications associated with the plantar fascia and Achilles tendon, these are chronic  Buttocks Hematoma  Assessment & Plan  Sustained during fall  CT showing Suspected small post traumatic hematoma measuring 2 cm in the soft tissues of the right buttock/perianal region  No active bleeding  Stable H&H  No overt pain complaints     Chemotherapy-induced thrombocytopenia-resolved as of 1/17/2022  Assessment & Plan  Resolved  1/17 Plt 233  UTI (urinary tract infection)-resolved as of 1/17/2022  Assessment & Plan  Resolved  Diagnosed in acute care  Urine culture + E  Coli   Treated with 5 days antibiotics Ceftriaxone and Keflex  Also was on oral vancomycin to prevent recurrent C   Diff  Asymptomatic     Urinary retention-resolved as of 1/17/2022  Assessment & Plan  Likely related to UTI  Bonner catheter inserted 1/8/22   Bonner removed 1/14/22 ; straight cath x 1   Patient is now continent all day 1/15/22   Retention has resolved  Health Maintenance  #Delirium/Sleep: At high risk  Optimize bowel/bladder/pain management  Environmental interventions - avoid physical and chemical restraint  Redirect  #Pain: Tylenol PRN  #Bowel: Last BM 1/17, rectal suppository and miralax ordered PRN  #Bladder:  Voiding and continent  #Skin/Pressure Injury Prevention: Turn Q2hr in bed, with weight shifts [id number] in wheelchair  #DVT Prophylaxis: Fully anticoagulated on eliquis, SCDs  #GI Prophylaxis: Protonix  #Code Status:  Full Code  #FEN:  Diabetic diet  #Dispo:  Team on 1/13: Plan to reteam        Objective:    Functional Update:  PT: Sup bed mobility, CGA transfers, Reta ambulation,  OT:  Progressing to Sup with ADLs  SLP:  Reta for comprehension, Social Interaction, Sup for expression, maxA problem solving/memory  Allergies per EMR    Physical Exam:  Temp:  [96 5 °F (35 8 °C)-97 7 °F (36 5 °C)] 97 2 °F (36 2 °C)  HR:  [58-75] 68  Resp:  [18] 18  BP: (111-140)/(58-80) 138/80  Oxygen Therapy  SpO2: 95 %    Gen: No acute distress, Well-nourished, well-appearing  HEENT: Moist mucus membranes, Normocephalic/Atraumatic  Cardiovascular: Regular rate, rhythm, S1/S2  Distal pulses palpable  Heme/Extr: 1+ edema bilateral LE  Pulmonary: Non-labored breathing  Lungs CTAB  : No bahena  GI: Soft, non-tender, non-distended  BS+  Integumentary: Skin is warm, dry  Neuro: AAOx3,  Speech is intact  Appropriate to questioning  Tone is normal  Impaired sequencing  Psych: Normal mood and affect  Diagnostic Studies: Reviewed, no new imaging    Laboratory:  Reviewed   Results from last 7 days   Lab Units 01/17/22  0603 01/12/22  0427   HEMOGLOBIN g/dL 10 6* 11 6*   HEMATOCRIT % 32 5* 35 4*   WBC Thousand/uL 7 80 9 70     Results from last 7 days   Lab Units 01/17/22  0603 01/14/22  0434 01/12/22  0427   BUN mg/dL 34* 38* 35*   POTASSIUM mmol/L 4 0 3 8 3 8   CHLORIDE mmol/L 107 103 101   CREATININE mg/dL 1 69* 1 75* 1 76*            Patient Active Problem List   Diagnosis    Buttocks Hematoma    Contusion of left lower leg    [facility] )    Chronic kidney disease, stage 3 ([facility] )    Hypertension    Diabetes type 2, controlled ([facility] )    Depression    Hypokalemia    Atrial fibrillation (HCC)    Hypomagnesemia    Ground glass opacity present on imaging of lung    Acute ischemic stroke (HCC)    Encephalopathy    Confusion    Diastolic congestive heart failure (HCC)    GERD (gastroesophageal reflux disease)    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HCC)    Dyslipidemia    Lethargy         Medications  Current Facility-Administered Medications   Medication Dose Route Frequency Provider Last Rate    acetaminophen  650 mg Oral Q6H PRN Yadi Mckinney MD      apixaban  5 mg Oral BID Yadi Mckinney MD      aspirin  81 mg Oral Daily Yadi Mckinney MD      atorvastatin  80 mg Oral Daily With Demarcus Blair MD      bisacodyl  10 mg Rectal Daily PRN Yadi Mckinney MD      busPIRone  7 5 mg Oral BID Yadi Mckinney MD      cholecalciferol  2,000 Units Oral Daily Yadi Mckinney MD      cyanocobalamin  500 mcg Oral Daily Yadi Mckinney MD      Diclofenac Sodium  2 g Topical TID Yadi Mckinney MD      docusate sodium  100 mg Oral BID PRN Yadi Mckinney MD      folic acid  1 mg Oral Daily Yadi Mckinney MD      insulin lispro  1-6 Units Subcutaneous TID AC Bridget Distance, CRNP      loratadine  10 mg Oral Daily Yadi Mckinney MD      losartan  25 mg Oral Daily Bridget Distance, CRNP      metoprolol tartrate  25 mg Oral Q12H [address] Yadi Mckinney MD      ondansetron  4 mg Oral Q6H PRN Yadi Mckinney MD      pantoprazole  40 mg Oral Early Morning Yadi Mckinney MD      polyethylene glycol  17 g Oral Daily PRN Yadi Mckinney MD      sitaGLIPtin  25 mg Oral Daily Emy Ashby MD      sotalol  80 mg Oral BID Yadi Mckinney MD      venlafaxine  37 5 mg Oral HS Yadi Mckinney MD          ** Please Note: Fluency Direct voice to text software may have been used in the creation of this document   **

## 2022-01-18 NOTE — CASE MANAGEMENT
Cm spoke with pt's dtr regarding scheduled d/c for 1/21  PT will not need any equipment  They chose [facility] for home PT and OT  In preparation for d/c, cm placed referral for home PT and OT with [facility] via ecin

## 2022-01-18 NOTE — PROGRESS NOTES
01/18/22 0901   Pain Assessment   Pain Assessment Tool 0-10   Pain Score No Pain   Restrictions/Precautions   Precautions Bed/chair alarms;Cognitive; Fall Risk;Impulsive;Supervision on toilet/commode   Comprehension   Comprehension (FIM) 4 - Understands basic info/conversation 75-90% of time   Expression   Expression (FIM) 5 - Needs help/cues only RARELY (< 10% of the time)   Social Interaction   Social Interaction (FIM) 5 - Needs monitoring/encouragement  to participate/interact   Problem Solving   Problem solving (FIM) 2 - Solves basic problems 25-49% of time   Memory   Memory (FIM) 2 - Recognizes, recalls/performs 25-49%   Speech/Language/Cognition Assessmetn   Treatment Assessment Pt seen for skilled speech therapy session targeting cognitive linguistic communication skills  Pt alert and interactive- engaged in rapport conversation as this was SLP's first time meeting pt  Pt was able to recall reason for hospitalization with recalling "stroke" but unable to state where in brain and unable to ID deficits since stroke as to reason for rehab  Provided location of stroke (R basal ganglia) and the deficits she may be having  Pt engaged in simple written sequencing task with 4 step items  Pt instructed to put the steps in the correct order- able to complete with 18/32acc increasing with mod verbal cues  Noted pt making multiple different errors-  forgetting to complete and fill in all the numbers, witting numbers that do not make sense for the purpose of task (2 5, 3 6?) and then using numbers that go beyond the means of the task (>5)  When provided direct cues for attention to one task at a time and reading the steps outloud, pt with improved comprehension and completion  Pt still needed direct cues though for placement of numbers and to keep track of what number we were on with the task   Pt overall is improving with skilled SLP services and will cont to benefit to maximize overall cognitive linguistic communication abilities at this time  SLP Therapy Minutes   SLP Time In 0900   SLP Time Out 0930   SLP Total Time (minutes) 30   SLP Mode of treatment - Individual (minutes) 30   SLP Mode of treatment - Concurrent (minutes) 0   SLP Mode of treatment - Group (minutes) 0   SLP Mode of treatment - Co-treat (minutes) 0   SLP Mode of Treatment - Total time(minutes) 30 minutes   SLP Cumulative Minutes 170   Therapy Time missed   Time missed?  No

## 2022-01-19 DIAGNOSIS — C34.92 ADENOCARCINOMA OF LEFT LUNG, STAGE 4 (HCC): ICD-10-CM

## 2022-01-19 DIAGNOSIS — Z51.5 PALLIATIVE CARE PATIENT: ICD-10-CM

## 2022-01-19 DIAGNOSIS — F32.5 MAJOR DEPRESSIVE DISORDER WITH SINGLE EPISODE, IN FULL REMISSION (HCC): ICD-10-CM

## 2022-01-19 LAB
GLUCOSE SERPL-MCNC: 131 MG/DL (ref 65–140)
GLUCOSE SERPL-MCNC: 137 MG/DL (ref 65–140)
GLUCOSE SERPL-MCNC: 238 MG/DL (ref 65–140)
GLUCOSE SERPL-MCNC: 86 MG/DL (ref 65–140)

## 2022-01-19 PROCEDURE — 97130 THER IVNTJ EA ADDL 15 MIN: CPT

## 2022-01-19 PROCEDURE — 99232 SBSQ HOSP IP/OBS MODERATE 35: CPT | Performed by: PHYSICAL MEDICINE & REHABILITATION

## 2022-01-19 PROCEDURE — 97530 THERAPEUTIC ACTIVITIES: CPT

## 2022-01-19 PROCEDURE — 82948 REAGENT STRIP/BLOOD GLUCOSE: CPT

## 2022-01-19 PROCEDURE — 97110 THERAPEUTIC EXERCISES: CPT

## 2022-01-19 PROCEDURE — 97129 THER IVNTJ 1ST 15 MIN: CPT

## 2022-01-19 PROCEDURE — 99232 SBSQ HOSP IP/OBS MODERATE 35: CPT | Performed by: INTERNAL MEDICINE

## 2022-01-19 PROCEDURE — 97112 NEUROMUSCULAR REEDUCATION: CPT

## 2022-01-19 PROCEDURE — 97535 SELF CARE MNGMENT TRAINING: CPT

## 2022-01-19 PROCEDURE — 97116 GAIT TRAINING THERAPY: CPT

## 2022-01-19 RX ORDER — BUSPIRONE HYDROCHLORIDE 7.5 MG/1
7.5 TABLET ORAL EVERY 12 HOURS SCHEDULED
Qty: 60 TABLET | Refills: 0 | Status: SHIPPED | OUTPATIENT
Start: 2022-01-19 | End: 2022-02-14 | Stop reason: SDUPTHER

## 2022-01-19 RX ADMIN — FOLIC ACID 1 MG: 1 TABLET ORAL at 08:32

## 2022-01-19 RX ADMIN — LOSARTAN POTASSIUM 25 MG: 25 TABLET, FILM COATED ORAL at 08:34

## 2022-01-19 RX ADMIN — SOTALOL HYDROCHLORIDE 80 MG: 80 TABLET ORAL at 17:06

## 2022-01-19 RX ADMIN — INSULIN LISPRO 3 UNITS: 100 INJECTION, SOLUTION INTRAVENOUS; SUBCUTANEOUS at 12:12

## 2022-01-19 RX ADMIN — ATORVASTATIN CALCIUM 80 MG: 80 TABLET, FILM COATED ORAL at 17:06

## 2022-01-19 RX ADMIN — BUSPIRONE HYDROCHLORIDE 7.5 MG: 15 TABLET ORAL at 21:16

## 2022-01-19 RX ADMIN — APIXABAN 5 MG: 5 TABLET, FILM COATED ORAL at 17:06

## 2022-01-19 RX ADMIN — ASPIRIN 81 MG: 81 TABLET, COATED ORAL at 08:34

## 2022-01-19 RX ADMIN — SITAGLIPTIN 25 MG: 25 TABLET, FILM COATED ORAL at 08:34

## 2022-01-19 RX ADMIN — METOPROLOL TARTRATE 25 MG: 25 TABLET, FILM COATED ORAL at 08:34

## 2022-01-19 RX ADMIN — LORATADINE 10 MG: 10 TABLET ORAL at 08:34

## 2022-01-19 RX ADMIN — APIXABAN 5 MG: 5 TABLET, FILM COATED ORAL at 08:34

## 2022-01-19 RX ADMIN — BUSPIRONE HYDROCHLORIDE 7.5 MG: 15 TABLET ORAL at 08:34

## 2022-01-19 RX ADMIN — ACETAMINOPHEN 650 MG: 325 TABLET ORAL at 11:13

## 2022-01-19 RX ADMIN — CYANOCOBALAMIN TAB 1000 MCG 500 MCG: 1000 TAB at 08:34

## 2022-01-19 RX ADMIN — SOTALOL HYDROCHLORIDE 80 MG: 80 TABLET ORAL at 08:34

## 2022-01-19 RX ADMIN — VENLAFAXINE HYDROCHLORIDE 37.5 MG: 37.5 CAPSULE, EXTENDED RELEASE ORAL at 21:16

## 2022-01-19 RX ADMIN — Medication 2000 UNITS: at 08:34

## 2022-01-19 RX ADMIN — PANTOPRAZOLE SODIUM 40 MG: 40 TABLET, DELAYED RELEASE ORAL at 06:18

## 2022-01-19 RX ADMIN — METOPROLOL TARTRATE 25 MG: 25 TABLET, FILM COATED ORAL at 21:17

## 2022-01-19 NOTE — ASSESSMENT & PLAN NOTE
Lab Results   Component Value Date    HGBA1C 5 8 (H) 01/05/2022       Recent Labs     01/18/22  1118 01/18/22  1613 01/18/22 2045 01/19/22  0559   POCGLU 199* 137 163* 131       Blood Sugar Average: Last 72 hrs:  (P) 649 [id number]     A1c 5 8 on 1/5  Home regimen: metformin 1000mg BID and Tradjenta 5mg daily  Currently on SSI - increased to algorithm 3 on 1/13  Creatinine clearance 35  Hold on metformin  Started on Januvia 25mg daily on 1/18 - transition to [address] at discharge  Continue QID accuchecks and cons  carb diet  Continue to follow-up with PCP as outpatient

## 2022-01-19 NOTE — PROGRESS NOTES
01/19/22 0830   Pain Assessment   Pain Score No Pain   Restrictions/Precautions   Precautions Cognitive;Bed/chair alarms; Fall Risk;Supervision on toilet/commode   Weight Bearing Restrictions No   ROM Restrictions No   Sit to Stand   Type of Assistance Needed Supervision; Adaptive equipment   Physical Assistance Level No physical assistance   Comment S with RW   Sit to Stand CARE Score 4   Bed-Chair Transfer   Type of Assistance Needed Supervision   Physical Assistance Level No physical assistance   Comment S with RW   Chair/Bed-to-Chair Transfer CARE Score 4   Toileting Hygiene   Type of Assistance Needed Supervision   Physical Assistance Level No physical assistance   Comment S with RW, voided 600mL, Lrg BM, communicated with RUBI Ugalde Hygiene CARE Score 4   Toilet Transfer   Type of Assistance Needed Supervision; Adaptive equipment   Physical Assistance Level No physical assistance   Comment S with RW   Toilet Transfer CARE Score 4   Commmunity Re-entry   Community Re-entry Level Walker   Community Re-entry Level of Assistance Close supervision   Community Re-entry Engaged pt in fx mobility around hospital to [employer] re-integration  Req S for safety and RW mngmnt  Tlerated well with no SOB observed    Therapeutic Excerise-Strength   UE Strength Yes   Right Upper Extremity- Strength   R Shoulder Flexion;ABduction; Extension;Horizontal ABduction; External rotation; Internal rotation   R Elbow Elbow flexion;Elbow extension   R Position Seated   Equipment Theraband  (yellow)   R Weight/Reps/Sets 2x15   RUE Strength Comment provided pt with UE HEP program to cont to inc fx strength  Pt will req supervision to complete 2* to cognitive deficits  Pt will have S from dtr at [facility]   Left Upper Extremity-Strength   LUE Strength Comment see above   Cognition   Overall Cognitive Status Impaired   Arousal/Participation Alert; Cooperative   Attention Attends with cues to redirect   Orientation Level Oriented to person;Oriented to place;Oriented to situation;Disoriented to time   Memory Decreased short term memory   Following Commands Follows one step commands with increased time or repetition   Additional Activities   Additional Activities   (ball toss)   Additional Activities Comments engaged pt in dynsmic balane while standing on airex to challenge ENRIQUE, endurance, strength, coord and weight shifting for inc participation in ADL tasks  Toerlated x2, however, req to sit 2* to L knee discomfort   Activity Tolerance   Activity Tolerance Patient tolerated treatment well   Assessment   Treatment Assessment Engaged pt in 60mins of skilled OT services with focus on toileting, fx mobility, balance and UE HEP  /65  Pt toelrated tx session well and performing toileting and comm re-entry at S level with RW  Pts dtr to ptovide S at DC  Pt with anticipated DC on 1/21/22 with home PT/OT/RN  Pt will require OT services with focus on cognition as pt demonstrates decreased visuospatial/executive, memory, attention, orientation to inc overall fx performance and dec caregiver burden  Prognosis Fair   Problem List Decreased strength;Decreased endurance; Impaired balance;Decreased mobility; Decreased cognition; Impaired judgement;Decreased safety awareness   Barriers to Discharge Comments will req supervision which pt has dtr staying with her   Plan   Treatment/Interventions ADL retraining;Functional transfer training; Therapeutic exercise; Endurance training;Patient/family training;Bed mobility; Compensatory technique education   Progress Progressing toward goals   Recommendation   OT Discharge Recommendation Home with home rehabilitation  (PT/OT RN)   Equipment Recommended Shower/Tub chair with back ($)   OT Equipment ordered has shower chair   OT - OK to Discharge Yes   Discharge Summary anticipated DC Fri 1/21/22   OT Therapy Minutes   OT Time In 0830   OT Time Out 0930   OT Total Time (minutes) 60   OT Mode of treatment - Individual (minutes) 60   OT Mode of treatment - Concurrent (minutes) 0   OT Mode of treatment - Group (minutes) 0   OT Mode of treatment - Co-treat (minutes) 0   OT Mode of Treatment - Total time(minutes) 60 minutes   OT Cumulative Minutes 660   Therapy Time missed   Time missed?  No

## 2022-01-19 NOTE — ASSESSMENT & PLAN NOTE
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  Currently receiving chemotherapy: Ramucirumab + Docetaxel Q21 days  Will need to restart after discharge from [facility]  Follows with Dr Atif Naranjo (Heme/Onc) as outpatient

## 2022-01-19 NOTE — PROGRESS NOTES
01/18/22 1230   Pain Assessment   Pain Assessment Tool 0-10   Pain Score No Pain   Restrictions/Precautions   Precautions Cognitive;Bed/chair alarms; Fall Risk;Supervision on toilet/commode   Cognition   Overall Cognitive Status Impaired   Memory Decreased short term memory   Subjective   Subjective no complaints   Roll Left and Right   Type of Assistance Needed Independent   Roll Left and Right CARE Score 6   Sit to Lying   Type of Assistance Needed Supervision   Sit to Lying CARE Score 4   Lying to Sitting on Side of Bed   Type of Assistance Needed Supervision   Lying to Sitting on Side of Bed CARE Score 4   Sit to Stand   Type of Assistance Needed Supervision   Sit to Stand CARE Score 4   Bed-Chair Transfer   Type of Assistance Needed Supervision   Chair/Bed-to-Chair Transfer CARE Score 4   Car Transfer   Reason if not Attempted Environmental limitations   Car Transfer CARE Score 10   Walk 10 Feet   Type of Assistance Needed Supervision   Comment with RW and w/o device   Walk 10 Feet CARE Score 4   Walk 50 Feet with Two Turns   Type of Assistance Needed Supervision; Adaptive equipment   Comment with RW, cueing to decrease scissoring with turns, recommend continued practice   Walk 50 Feet with Two Turns CARE Score 4   Walk 150 Feet   Type of Assistance Needed Supervision; Adaptive equipment   Comment with RW   Walk 150 Feet CARE Score 4   Walking 10 Feet on Uneven Surfaces   Type of Assistance Needed Supervision; Adaptive equipment   Comment walking with RW across mat and up/down ramp with RW   Walking 10 Feet on Uneven Surfaces CARE Score 4   Ambulation   Does the patient walk? 2  Yes   Distance Walked (feet) 300 ft   Findings Standing tolerance, distance ambulated limite by L LE weakness with c/o "Knee getting tired or weak"   Wheelchair mobility   Does the patient use a wheelchair? 0  No   Curb or Single Stair   Style negotiated Curb   Type of Assistance Needed Supervision; Adaptive equipment   Comment Close Sup with RW   1 Step (Curb) CARE Score 4   4 Steps   Type of Assistance Needed Supervision   Comment step to w/o cueing, using B HR   4 Steps CARE Score 4   12 Steps   Type of Assistance Needed Supervision   Comment step to using BHR   12 Steps CARE Score 4   Stairs   # of Steps 12   Therapeutic Interventions   Flexibility Manual stretch B HS and calves    Other Using walker in gym pt ambulating around objects working on turns, safety, problem solving and balance   Equipment Use   NuStep 12 min lvl 2 SPM 30-50, dpeending on if therapist assisting or pt performng on her own, Working on conditioning, coordination and neurl priming at start of session   Assessment   Treatment Assessment Pt participating well again with activity limited by reports of "left knee instability" Discussed habits at home for safety with pt acknowledging she would have fals due to poor judgement  Pt would continue to work even with Left leg weakness vs taken short seated rest breaks  Pt continues to require supervision with all tasks for this  Only asking for rest break when prompted by therapist  Keith Santos continues to be recommended for added stability along with supervision when returning home  PT Therapy Minutes   PT Time In 1230   PT Time Out 1330   PT Total Time (minutes) 60   PT Mode of treatment - Individual (minutes) 60   PT Mode of treatment - Concurrent (minutes) 0   PT Mode of treatment - Group (minutes) 0   PT Mode of treatment - Co-treat (minutes) 0   PT Mode of Treatment - Total time(minutes) 60 minutes   PT Cumulative Minutes 570   Therapy Time missed   Time missed?  No

## 2022-01-19 NOTE — PROGRESS NOTES
[facility]  Progress Note Robles Proud 1950, 66 y o  female MRN: [id number]  Unit/Bed#: [facility] 129-87 Encounter: [id number]  Primary Care Provider: Farrah Vieira DO   Date and time admitted to hospital: 1/11/2022  3:39 PM    Lethargy  Assessment & Plan  Difficulty maintaining conversation on 1/12 - falling asleep multiple times during exam   Admits to snoring - overnight noc ox ordered for 1/12 - lowest saturation 88% for 1 minute  Does not qualify for oxygen  CT head on 1/12: "No new intracranial abnormality  Continued evolution of late subacute to chronic right basal ganglia infarct  No hemorrhage or significant mass effect "  Frequent neuro-checks  Per family, pt has been experiencing more daytime sleepiness since starting chemo  Dyslipidemia  Assessment & Plan  Started on Lipitor 80mg daily in acute setting - continue  Lipid panel on 1/5: Cholesterol 201, Triglycerides 252, HDL 41,   GERD (gastroesophageal reflux disease)  Assessment & Plan  Stable  Continue Protonix 40mg daily as substitute for non-formulary Prilosec 20mg daily  Atrial fibrillation (HCC)  Assessment & Plan  Continue metoprolol tartrate 25mg BID and sotalol 80mg BID for rate control  Continue Eliquis 5mg BID for anticoagulation  Monitor routine VS   Replete electrolytes as needed  Continue to follow-up with Dr Jasmyne Enriquez as outpatient  Depression  Assessment & Plan  Stable  Continue Buspar 7 5mg Q12 and Effexor 37 5mg daily  Supportive counseling as needed  Follows with Palliative as outpatient  Diabetes type 2, controlled [facility])  Assessment & Plan  Lab Results   Component Value Date    HGBA1C 5 8 (H) 01/05/2022       Recent Labs     01/18/22  1118 01/18/22  1613 01/18/22  2045 01/19/22  0559   POCGLU 199* 137 163* 131       Blood Sugar Average: Last 72 hrs:  (P) 667 [id number]     A1c 5 8 on 1/5  Home regimen: metformin 1000mg BID and Tradjenta 5mg daily    Currently on SSI - increased to algorithm 3 on 1/13  Creatinine clearance 35  Hold on metformin  Started on Januvia 25mg daily on 1/18 - transition to [address] at discharge  Continue QID accuchecks and cons  carb diet  Continue to follow-up with PCP as outpatient  Hypertension  Assessment & Plan  Continue home regimen of Losartan 50mg daily and metoprolol tartrate 25mg BID  Started on amlodipine 5mg BID in acute setting - continue at this time  Started on Lasix 40mg daily in acute setting - only taking as needed at home  Currently on hold  Losartan restarted on 1/11 - decreased dose to 25mg daily on 1/12 due to lethargy/possible hypoperfusion  Decreased amlodipine to 5mg at HS on 1/14  Discontinued on 1/18  Monitor BP with VS   Holding parameters for SBP <130  Continue to follow-up with Dr Akua Yi as outpatient  Chronic kidney disease, stage 3 [facility])  Assessment & Plan  Lab Results   Component Value Date    EGFR 29 01/17/2022    EGFR 28 01/14/2022    EGFR 28 01/12/2022    CREATININE 1 69 (H) 01/17/2022    CREATININE 1 75 (H) 01/14/2022    CREATININE 1 76 (H) 01/12/2022     Creatinine currently 1 69  Baseline appears to be 1 5-1 8  Avoid nephrotoxins  Encourage adequate hydration  Appears euvolemic  Lasix on hold  Continue to trend routine BMP  Previously seen by Dr Garlon Lesch (Nephrology) as an outpatient  Lung cancer [facility])  Assessment & Plan  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  Currently receiving chemotherapy: Ramucirumab + Docetaxel Q21 days  Will need to restart after discharge from [facility]  Follows with Dr Leah Landa (Heme/Onc) as outpatient  * Acute ischemic stroke [facility])  Assessment & Plan  1/3 - admitted with AMS and multiple falls  CT head on 1/3: No acute intracranial abnormalities  MRI brain on 1/4: R basal ganglia and R caudate acute ischemia  MRI brain with contrast on 1/5: No enhancing lesions  Repeat CT head on 1/6: Evolving R basal ganglia infarct  No hemorrhage or significant mass effect  ECHO on  - EF 60% with grade 1 relaxation  EEG on  - Non-specific  Continue Lipitor 80mg daily and ASA 81mg daily  Likely related to small vessel disease  Maintain normotension  Neurovascular checks Q shift  Follow-up with Neurology as outpatient  PT/OT/Speech therapies  Primary team following  VTE Pharmacologic Prophylaxis:   Pharmacologic: Apixaban (Eliquis)  Mechanical VTE Prophylaxis in Place: Yes - sequential compression devices  Current Length of Stay: 8 day(s)    Current Patient Status: Inpatient Rehab     Discharge Plan: As per primary team     Code Status: Level 3 - DNAR and DNI    Subjective:   Pt examined while pt sitting in recliner in pt room  Plans for discharge on Friday  Feels that everything is going well  Has no complaints currently  Reviewed medications for T2DM, plans to discharge on home Tradjenta only  Objective:     Vitals:   Temp (24hrs), Av 8 °F (36 °C), Min:96 4 °F (35 8 °C), Max:97 7 °F (36 5 °C)    Temp:  [96 4 °F (35 8 °C)-97 7 °F (36 5 °C)] 96 4 °F (35 8 °C)  HR:  [60-63] 63  Resp:  [18-20] 18  BP: (112-144)/(57-65) 144/65  SpO2:  [97 %-98 %] 98 %  Body mass index is 28 34 kg/m²  Review of Systems   Constitutional: Negative for appetite change, chills, fatigue and fever  Respiratory: Negative for cough and shortness of breath  Cardiovascular: Negative for chest pain, palpitations and leg swelling  Gastrointestinal: Negative for abdominal distention, abdominal pain, constipation, diarrhea, nausea and vomiting  LBM    Genitourinary: Negative for difficulty urinating  Musculoskeletal: Negative for arthralgias and back pain  Neurological: Negative for dizziness, weakness, light-headedness, numbness and headaches  Psychiatric/Behavioral: Negative for sleep disturbance  Input and Output Summary (last 24 hours):        Intake/Output Summary (Last 24 hours) at 2022 0915  Last data filed at 1/19/2022 0847  Gross per 24 hour   Intake 540 ml   Output 1900 ml   Net -1360 ml       Physical Exam:     Physical Exam  Vitals and nursing note reviewed  Constitutional:       Appearance: Normal appearance  HENT:      Head: Normocephalic and atraumatic  Cardiovascular:      Rate and Rhythm: Normal rate and regular rhythm  Pulses: Normal pulses  Heart sounds: Normal heart sounds  No murmur heard  No friction rub  Pulmonary:      Effort: Pulmonary effort is normal  No respiratory distress  Breath sounds: Normal breath sounds  No wheezing or rhonchi  Abdominal:      General: Abdomen is flat  Bowel sounds are normal  There is no distension  Palpations: Abdomen is soft  Tenderness: There is no abdominal tenderness  Musculoskeletal:      Cervical back: Normal range of motion and neck supple  Right lower leg: Edema (Trace non-pitting edema) present  Left lower leg: Edema (Trace non-pitting edema) present  Skin:     General: Skin is warm and dry  Capillary Refill: Capillary refill takes less than 2 seconds  Neurological:      Mental Status: She is alert and oriented to person, place, and time     Psychiatric:         Mood and Affect: Mood normal          Behavior: Behavior normal          Additional Data:     Labs:    Results from last 7 days   Lab Units 01/17/22  0603   WBC Thousand/uL 7 80   HEMOGLOBIN g/dL 10 6*   HEMATOCRIT % 32 5*   PLATELETS Thousands/uL 233   NEUTROS PCT % 72*   LYMPHS PCT % 14*   MONOS PCT % 9   EOS PCT % 4     Results from last 7 days   Lab Units 01/17/22  0603   SODIUM mmol/L 137   POTASSIUM mmol/L 4 0   CHLORIDE mmol/L 107   CO2 mmol/L 27   BUN mg/dL 34*   CREATININE mg/dL 1 69*   ANION GAP mmol/L 3*   CALCIUM mg/dL 8 2*   GLUCOSE RANDOM mg/dL 150*         Results from last 7 days   Lab Units 01/19/22  0559 01/18/22  2045 01/18/22  1613 01/18/22  1118 01/18/22  0557 01/17/22  1608 01/17/22  1142 01/17/22  4952 01/16/22  2053 01/16/22  1636 01/16/22  1131 01/16/22  0637   POC GLUCOSE mg/dl 131 163* 137 199* 119 109 264* 144* 166* 129 239* 151*               Labs reviewed    Imaging:    Imaging reviewed    Recent Cultures (last 7 days):           Last 24 Hours Medication List:   Current Facility-Administered Medications   Medication Dose Route Frequency Provider Last Rate    acetaminophen  650 mg Oral Q6H PRN Azul Mcguire MD      apixaban  5 mg Oral BID Azul Mcguire MD      aspirin  81 mg Oral Daily Azul Mcguire MD      atorvastatin  80 mg Oral Daily With Xavier Garcia MD      bisacodyl  10 mg Rectal Daily PRN Azul Mcguire MD      busPIRone  7 5 mg Oral BID Azul Mcguire MD      cholecalciferol  2,000 Units Oral Daily Azul Mcguire MD      cyanocobalamin  500 mcg Oral Daily Azul Mcguire MD      Diclofenac Sodium  2 g Topical TID PRN Matt Wan MD      docusate sodium  100 mg Oral BID PRN Azul Mcguire MD      folic acid  1 mg Oral Daily Azul Mcguire MD      insulin lispro  1-6 Units Subcutaneous TID AC EDUARDO Hill      loratadine  10 mg Oral Daily Azul Mcguire MD      losartan  25 mg Oral Daily EDUARDO [address]      metoprolol tartrate  25 mg Oral Q12H [address] Azul Mcguire MD      ondansetron  4 mg Oral Q6H PRN Azul Mcguire MD      pantoprazole  40 mg Oral Early Morning Azul Mcguire MD      polyethylene glycol  17 g Oral Daily PRN Azul Mcguire MD      sitaGLIPtin  25 mg Oral Daily Matt Wan MD      sotalol  80 mg Oral BID Azul Mcguire MD      venlafaxine  37 5 mg Oral HS Azul Mcguire MD          M*Modal software was used to dictate this note  It may contain errors with dictating incorrect words or incorrect spelling  Please contact the provider directly with any questions

## 2022-01-19 NOTE — ASSESSMENT & PLAN NOTE
Continue metoprolol tartrate 25mg BID and sotalol 80mg BID for rate control  Continue Eliquis 5mg BID for anticoagulation  Monitor routine VS   Replete electrolytes as needed  Continue to follow-up with Dr Rupali Wright as outpatient

## 2022-01-19 NOTE — MALNUTRITION/BMI
This medical record reflects one or more clinical indicators suggestive of malnutrition  Malnutrition Findings:   Adult Malnutrition type: Chronic illness  Adult Degree of Malnutrition: Malnutrition of moderate degree (Moderate Chronic Malnutrition r/t catabolic illness as evidenced by 18% wt loss x 6mo  (7/19/21, 202#), moderate muscle wasting in temples, and mild fluid accumulation (+1) in LE; treated w/ supplements)  Malnutrition Characteristics: Muscle loss,Weight loss,Fluid accumulation    See Nutrition note dated 1/19/2022 for additional details  Completed nutrition assessment is viewable in the nutrition documentation

## 2022-01-19 NOTE — ASSESSMENT & PLAN NOTE
Continue home regimen of Losartan 50mg daily and metoprolol tartrate 25mg BID  Started on amlodipine 5mg BID in acute setting - continue at this time  Started on Lasix 40mg daily in acute setting - only taking as needed at home  Currently on hold  Losartan restarted on 1/11 - decreased dose to 25mg daily on 1/12 due to lethargy/possible hypoperfusion  Decreased amlodipine to 5mg at HS on 1/14  Discontinued on 1/18  Monitor BP with VS   Holding parameters for SBP <130  Continue to follow-up with Dr Twin Hollingsworth as outpatient

## 2022-01-19 NOTE — PROGRESS NOTES
01/19/22 1000   Pain Assessment   Pain Assessment Tool 0-10   Pain Score 3   Pain Location/Orientation Orientation: Left; Location: Knee   Restrictions/Precautions   Precautions Bed/chair alarms;Cognitive; Fall Risk;Supervision on toilet/commode   Sit to Stand   Type of Assistance Needed Supervision   Comment RW   Sit to Stand CARE Score 4   Bed-Chair Transfer   Type of Assistance Needed Supervision   Comment RW   Chair/Bed-to-Chair Transfer CARE Score 4   Walk 10 Feet   Type of Assistance Needed Supervision   Comment RW   Walk 10 Feet CARE Score 4   Walk 50 Feet with Two Turns   Type of Assistance Needed Supervision   Comment RW   Walk 50 Feet with Two Turns CARE Score 4   Walk 150 Feet   Type of Assistance Needed Supervision   Comment RW   Walk 150 Feet CARE Score 4   Walking 10 Feet on Uneven Surfaces   Type of Assistance Needed Supervision   Comment RW   Walking 10 Feet on Uneven Surfaces CARE Score 4   Ambulation   Does the patient walk? 2  Yes   Primary Mode of Locomotion Prior to Admission Walk   Distance Walked (feet) 150 ft  (x2)   Assist Device Roller Walker   Walk Assist Level Supervision   Findings At times slight antalgic and lateral lean but no overt LOB - pt showing improvement with walker management - needs S for safety and due to dec cognition   Wheelchair mobility   Does the patient use a wheelchair? 0   No   4 Steps   Type of Assistance Needed Supervision   Comment step to pattern,  bilat HR   4 Steps CARE Score 4   Therapeutic Interventions   Neuromuscular Re-Education Std on blue foam ball toss/ catch multi directions,  Std unsupported on blue foam balloon tap to challenge reaction time,  Std unsupported alt toe tap on 2'' foam,   Amb 40feet x4  no device,   Amb with RW figure 8 course with also canes laid on floor to challenge balance and step overs   Equipment Use   NuStep 15 mins for therapuetic ex    Assessment   Treatment Assessment 90 min session focused on amb bouts, steps, balance work on foam and with walker  Pt continues to show inc stability but still needs S for cognition and pt still fall risk  Pt will be D/C friday with S from family and receive home therapy  Plan   Progress Progressing toward goals   Recommendation   PT Discharge Recommendation Home with home health rehabilitation   PT Therapy Minutes   PT Time In 1000   PT Time Out 1130   PT Total Time (minutes) 90   PT Mode of treatment - Individual (minutes) 90   PT Mode of treatment - Concurrent (minutes) 0   PT Mode of treatment - Group (minutes) 0   PT Mode of treatment - Co-treat (minutes) 0   PT Mode of Treatment - Total time(minutes) 90 minutes   PT Cumulative Minutes 660   Therapy Time missed   Time missed?  No

## 2022-01-19 NOTE — PLAN OF CARE
Problem: MUSCULOSKELETAL - ADULT  Goal: Maintain or return mobility to safest level of function  Description: INTERVENTIONS:  - Assess patient's ability to carry out ADLs; assess patient's baseline for ADL function and identify physical deficits which impact ability to perform ADLs (bathing, care of mouth/teeth, toileting, grooming, dressing, etc )  - Assess/evaluate cause of self-care deficits   - Assess range of motion  - Assess patient's mobility  - Assess patient's need for assistive devices and provide as appropriate  - Encourage maximum independence but intervene and supervise when necessary  - Involve family in performance of ADLs  - Assess for home care needs following discharge   - Consider OT consult to assist with ADL evaluation and planning for discharge  - Provide patient education as appropriate  Outcome: Progressing     Problem: Potential for Falls  Goal: Patient will remain free of falls  Description: INTERVENTIONS:  - Educate patient/family on patient safety including physical limitations  - Instruct patient to call for assistance with activity   - Consult OT/PT to assist with strengthening/mobility   - Keep Call bell within reach  - Keep bed low and locked with side rails adjusted as appropriate  - Keep care items and personal belongings within reach  - Initiate and maintain comfort rounds  - Make Fall Risk Sign visible to staff  - Initiate/Maintain bed/chair alarm  - Obtain necessary fall risk management equipment: RW  - Apply yellow socks and bracelet for high fall risk patients  - Consider moving patient to room near nurses station  Outcome: Progressing

## 2022-01-19 NOTE — PLAN OF CARE
Problem: SAFETY ADULT  Goal: Patient will remain free of falls  Description: INTERVENTIONS:  - Educate patient/family on patient safety including physical limitations  - Instruct patient to call for assistance with activity   - Consult OT/PT to assist with strengthening/mobility   - Keep Call bell within reach  - Keep bed low and locked with side rails adjusted as appropriate  - Keep care items and personal belongings within reach  - Initiate and maintain comfort rounds  - Make Fall Risk Sign visible to staff  - Initiate/Maintain bed and chair alarm  - Apply yellow socks and bracelet for high fall risk patients  - Consider moving patient to room near nurses station  Outcome: Progressing  Goal: Maintain or return to baseline ADL function  Description: INTERVENTIONS:  -  Assess patient's ability to carry out ADLs; assess patient's baseline for ADL function and identify physical deficits which impact ability to perform ADLs (bathing, care of mouth/teeth, toileting, grooming, dressing, etc )  - Assess/evaluate cause of self-care deficits   - Assess range of motion  - Assess patient's mobility; develop plan if impaired  - Assess patient's need for assistive devices and provide as appropriate  - Encourage maximum independence but intervene and supervise when necessary  - Involve family in performance of ADLs  - Assess for home care needs following discharge   - Consider OT consult to assist with ADL evaluation and planning for discharge  - Provide patient education as appropriate  Outcome: Progressing  Goal: Maintains/Returns to pre admission functional level  Description: INTERVENTIONS:  - Perform BMAT or MOVE assessment daily    - Set and communicate daily mobility goal to care team and patient/family/caregiver     - Collaborate with rehabilitation services on mobility goals if consulted  - Out of bed for toileting  - Record patient progress and toleration of activity level   Outcome: Progressing

## 2022-01-19 NOTE — PROGRESS NOTES
01/19/22 1400   Pain Assessment   Pain Assessment Tool 0-10   Pain Score No Pain   Restrictions/Precautions   Precautions Bed/chair alarms;Cognitive; Fall Risk;Supervision on toilet/commode   Comprehension   Comprehension (FIM) 5 - Needs help/cues, repetition only RARELY ( < 10% of the time)   Expression   Expression (FIM) 5 - Needs help/cues only RARELY (< 10% of the time)   Social Interaction   Social Interaction (FIM) 5 - Interacts appropriately with others 90% of time   Problem Solving   Problem solving (FIM) 3 - Solves basic problmes 50-74% of time   Memory   Memory (FIM) 3 - Recognizes, recalls/performs 50-74%   Speech/Language/Cognition Assessmetn   Treatment Assessment Pt seen for skilled speech therapy session targeting cognitive linguistic communication skills  Pt laying in bed sleeping upon arrival, easily awoken and repositioned upright in bed for tasks  Pt engaged in drawing conclusions task with categorizing by like attributes  Pt was able to select items that best fit the category with Fx4 options with 26/33acc increasing with verbal cues and discussion as to how the items relate and thinking more abstractly about task  Discussed plan for discharge Friday- pt is going home with family who are able to provide 24hr supervision/assistance  Discussed activities pt can engage in and complete at home (word puzzles, reading, current events)  Pt stated she does have grandchildren who she can play card games with as well  Pt also expressed she "surfs the web" for recipes and sewing patterns and that keeps her mentally active as well  Pt overall is improving with skilled SLP services and will cont to benefit to maximize overall cognitive linguistic communication abilities at this time      SLP Therapy Minutes   SLP Time In 1400   SLP Time Out 1430   SLP Total Time (minutes) 30   SLP Mode of treatment - Individual (minutes) 30   SLP Mode of treatment - Concurrent (minutes) 0   SLP Mode of treatment - Group (minutes) 0   SLP Mode of treatment - Co-treat (minutes) 0   SLP Mode of Treatment - Total time(minutes) 30 minutes   SLP Cumulative Minutes 200   Therapy Time missed   Time missed?  No

## 2022-01-19 NOTE — ASSESSMENT & PLAN NOTE
Lab Results   Component Value Date    EGFR 29 01/17/2022    EGFR 28 01/14/2022    EGFR 28 01/12/2022    CREATININE 1 69 (H) 01/17/2022    CREATININE 1 75 (H) 01/14/2022    CREATININE 1 76 (H) 01/12/2022     Creatinine currently 1 69  Baseline appears to be 1 5-1 8  Avoid nephrotoxins  Encourage adequate hydration  Appears euvolemic  Lasix on hold  Continue to trend routine BMP  Previously seen by Dr Beverly Jimenez (Nephrology) as an outpatient

## 2022-01-20 LAB
ANION GAP SERPL CALCULATED.3IONS-SCNC: 1 MMOL/L (ref 5–14)
BASOPHILS # BLD AUTO: 0.1 THOUSANDS/ΜL (ref 0–0.1)
BASOPHILS NFR BLD AUTO: 1 % (ref 0–1)
BUN SERPL-MCNC: 29 MG/DL (ref 5–25)
CALCIUM SERPL-MCNC: 8.6 MG/DL (ref 8.4–10.2)
CHLORIDE SERPL-SCNC: 108 MMOL/L (ref 97–108)
CO2 SERPL-SCNC: 29 MMOL/L (ref 22–30)
CREAT SERPL-MCNC: 1.53 MG/DL (ref 0.6–1.2)
EOSINOPHIL # BLD AUTO: 0.4 THOUSAND/ΜL (ref 0–0.4)
EOSINOPHIL NFR BLD AUTO: 8 % (ref 0–6)
ERYTHROCYTE [DISTWIDTH] IN BLOOD BY AUTOMATED COUNT: 17.7 %
GFR SERPL CREATININE-BSD FRML MDRD: 33 ML/MIN/1.73SQ M
GLUCOSE P FAST SERPL-MCNC: 119 MG/DL (ref 70–99)
GLUCOSE SERPL-MCNC: 119 MG/DL (ref 70–99)
GLUCOSE SERPL-MCNC: 120 MG/DL (ref 65–140)
GLUCOSE SERPL-MCNC: 181 MG/DL (ref 65–140)
GLUCOSE SERPL-MCNC: 206 MG/DL (ref 65–140)
GLUCOSE SERPL-MCNC: 91 MG/DL (ref 65–140)
HCT VFR BLD AUTO: 30.9 % (ref 36–46)
HGB BLD-MCNC: 9.7 G/DL (ref 12–16)
LYMPHOCYTES # BLD AUTO: 0.8 THOUSANDS/ΜL (ref 0.5–4)
LYMPHOCYTES NFR BLD AUTO: 16 % (ref 25–45)
MCH RBC QN AUTO: 27.6 PG (ref 26–34)
MCHC RBC AUTO-ENTMCNC: 31.2 G/DL (ref 31–36)
MCV RBC AUTO: 89 FL (ref 80–100)
MONOCYTES # BLD AUTO: 0.4 THOUSAND/ΜL (ref 0.2–0.9)
MONOCYTES NFR BLD AUTO: 8 % (ref 1–10)
NEUTROPHILS # BLD AUTO: 3.3 THOUSANDS/ΜL (ref 1.8–7.8)
NEUTS SEG NFR BLD AUTO: 66 % (ref 45–65)
PLATELET # BLD AUTO: 212 THOUSANDS/UL (ref 150–450)
PMV BLD AUTO: 9.4 FL (ref 8.9–12.7)
POTASSIUM SERPL-SCNC: 4.3 MMOL/L (ref 3.6–5)
RBC # BLD AUTO: 3.5 MILLION/UL (ref 4–5.2)
SODIUM SERPL-SCNC: 138 MMOL/L (ref 137–147)
WBC # BLD AUTO: 5 THOUSAND/UL (ref 4.5–11)

## 2022-01-20 PROCEDURE — 97530 THERAPEUTIC ACTIVITIES: CPT

## 2022-01-20 PROCEDURE — 85025 COMPLETE CBC W/AUTO DIFF WBC: CPT | Performed by: NURSE PRACTITIONER

## 2022-01-20 PROCEDURE — 97110 THERAPEUTIC EXERCISES: CPT

## 2022-01-20 PROCEDURE — 99232 SBSQ HOSP IP/OBS MODERATE 35: CPT | Performed by: PHYSICAL MEDICINE & REHABILITATION

## 2022-01-20 PROCEDURE — 99232 SBSQ HOSP IP/OBS MODERATE 35: CPT | Performed by: INTERNAL MEDICINE

## 2022-01-20 PROCEDURE — 97116 GAIT TRAINING THERAPY: CPT

## 2022-01-20 PROCEDURE — 80048 BASIC METABOLIC PNL TOTAL CA: CPT | Performed by: NURSE PRACTITIONER

## 2022-01-20 PROCEDURE — 82948 REAGENT STRIP/BLOOD GLUCOSE: CPT

## 2022-01-20 PROCEDURE — 97535 SELF CARE MNGMENT TRAINING: CPT

## 2022-01-20 RX ADMIN — ASPIRIN 81 MG: 81 TABLET, COATED ORAL at 08:52

## 2022-01-20 RX ADMIN — CYANOCOBALAMIN TAB 1000 MCG 500 MCG: 1000 TAB at 08:52

## 2022-01-20 RX ADMIN — SOTALOL HYDROCHLORIDE 80 MG: 80 TABLET ORAL at 08:53

## 2022-01-20 RX ADMIN — METOPROLOL TARTRATE 25 MG: 25 TABLET, FILM COATED ORAL at 08:52

## 2022-01-20 RX ADMIN — BUSPIRONE HYDROCHLORIDE 7.5 MG: 15 TABLET ORAL at 21:16

## 2022-01-20 RX ADMIN — VENLAFAXINE HYDROCHLORIDE 37.5 MG: 37.5 CAPSULE, EXTENDED RELEASE ORAL at 21:16

## 2022-01-20 RX ADMIN — Medication 2000 UNITS: at 08:53

## 2022-01-20 RX ADMIN — LORATADINE 10 MG: 10 TABLET ORAL at 08:52

## 2022-01-20 RX ADMIN — FOLIC ACID 1 MG: 1 TABLET ORAL at 08:52

## 2022-01-20 RX ADMIN — PANTOPRAZOLE SODIUM 40 MG: 40 TABLET, DELAYED RELEASE ORAL at 06:32

## 2022-01-20 RX ADMIN — SOTALOL HYDROCHLORIDE 80 MG: 80 TABLET ORAL at 17:54

## 2022-01-20 RX ADMIN — ACETAMINOPHEN 650 MG: 325 TABLET ORAL at 08:52

## 2022-01-20 RX ADMIN — LOSARTAN POTASSIUM 25 MG: 25 TABLET, FILM COATED ORAL at 08:52

## 2022-01-20 RX ADMIN — APIXABAN 5 MG: 5 TABLET, FILM COATED ORAL at 17:54

## 2022-01-20 RX ADMIN — ATORVASTATIN CALCIUM 80 MG: 80 TABLET, FILM COATED ORAL at 17:54

## 2022-01-20 RX ADMIN — SITAGLIPTIN 25 MG: 25 TABLET, FILM COATED ORAL at 08:52

## 2022-01-20 RX ADMIN — INSULIN LISPRO 2 UNITS: 100 INJECTION, SOLUTION INTRAVENOUS; SUBCUTANEOUS at 11:57

## 2022-01-20 RX ADMIN — APIXABAN 5 MG: 5 TABLET, FILM COATED ORAL at 08:52

## 2022-01-20 RX ADMIN — METOPROLOL TARTRATE 25 MG: 25 TABLET, FILM COATED ORAL at 21:17

## 2022-01-20 RX ADMIN — BUSPIRONE HYDROCHLORIDE 7.5 MG: 15 TABLET ORAL at 08:52

## 2022-01-20 NOTE — PROGRESS NOTES
Physical Medicine and Rehabilitation Progress Note  Toño Wright 66 y o  female MRN: [id number]  Unit/Bed#: -60 Encounter: [id number]    HPI: Toño Wright is a 66 y o  female with known stage 4 juan cancer undergoing chemotherapy every 3 weeks, A fib on Eliquis, HTN, HLD, CHF (grade 2 DD), GERD, CKD3, DM2, bilateral knee osteoarthritis, depression/anxiety who presented to the [facility] on 1/3/22 due to 4 falls and leg contusion  Patient found to have a right buttock hematoma which remained stable  Patient developed altered mental status  CTH negative  MRI brain ultimately revealed a acute right basal ganglia ischemic CVA  MRA/MRV negative for occlusive disease  MRI brain with contrast negative for any enhancing lesions  Patient seen by Neurology and determined etiology of CVA small vessel disease  Aspirin was added to her secondary CVA ppx  Course c/b by UTI (s/p abx), urinary retention (bahena placed), diarrhea (Resolved)  Admitted to [facility] on 1/11  Chief Complaint: No new issues, feeling well, happy for discharge tomorrow  Interval History/Subjective:  No acute events overnight  Last BM was 1/19  Denies any CP, SOB, fevers, chills, N/V, abdominal pain  ROS:  A 10 point review of systems was negative except for what is noted in the HPI  Today's Changes:  1  Continue current plan of care  Assessment/Plan:    * Acute ischemic stroke [facility])  Assessment & Plan  Presented with 4 falls and mental status change  MRI brain confirmed a right basal ganglia and right caudate ischemic CVA  Etiology: Small vessel disease most likely     Secondary CVA ppx: managed on Eliquis 5mg BID, Lipitor 80mg daily, and now aspirin 81mg daily  Continue CVA education while at [address] [place] [address] education and reduction of modifiable risk factors  Monitor mood  Follow-up with Neurology as outpatient     Lethargy  Assessment & Plan  Unclear etiology, has since resolved  MOCA early in stay was 8/30, but will recheck  Neuropathy due to chemotherapeutic drug [facility])  Assessment & Plan  Not painful  Consider topical lidoderm patch or gabapentin if pain develops    GERD (gastroesophageal reflux disease)  Assessment & Plan  Managed on Protonix (therapeutic exchange for Prilosec)    Diastolic congestive heart failure (HCC)  Assessment & Plan  Grade I DD on echocardiogram  Lasix on HOLD due to ADITYA  Patient euvolemic, continue to monitor  Continue beta blockers, ARB  IM consultants following and managing   Follows with Dr Dayana Cho glass opacity present on imaging of lung  Assessment & Plan  Found incidentally on CT Chest  COVID - 19 negative   PCP to follow as outpatient     Atrial fibrillation [facility])  Assessment & Plan  Rate/rhythm controlled on Lopressor 25mg Q12hrs and Sotolol 80mg BID  Anticoagulated on Eliquis 5mg BID  Stable  IM consultants following while at [place] [address] with Dr Zeenat Mclain on home regimen of Buspar 7 5mg BID and Effexor XR 37 5 QHS  Mood is stable  Consult neuropsychology if needed    Diabetes type 2, controlled [facility])  Assessment & Plan  Lab Results   Component Value Date    HGBA1C 5 8 (H) 01/05/2022     At home was on Metformin and Tradjenta  Here: Othella Ariel not formulary  Januvia with plan to transition to home Tradjenta at discharge  Continue CCI, diabetic diet, accuchecks  Nutrition consulted   IM consultants following and managing     Hypertension  Assessment & Plan  Managed here on Lopressor 25mg Q12h, Losartan 25mg daily  Lasix on HOLD  Amlodipine discontinued due to soft BP  BP stable   Follows with Dr Susan Lewis    Chronic kidney disease, stage 3 [facility])  Assessment & Plan  ADITYA on CKD detail I  Baseline creatinine 1 5-1 8  Cr = 1 69  Lasix on HOLD as patient is euvolemic  Avoid nephrotoxic meds, monitor volume status     Losartan reduced from 50mg daily to 25mg daily by IM consultants    Lung cancer [facility])  Assessment & Plan  Known stage 4 adenocarcinoma of lung   · Follows with Dr Turner Sam  · [facility] Chemotherapy every 3 weeks  Last infusion was 12/27/21  Missed her 1/7/22 infusion due to hospitalization  · Follow-up with Dr Turner Sam as outpatient   CT Chest: Scattered groundglass opacities in both lungs which are new from prior exam   Consider infectious or inflammatory etiologies including Covid viral pneumonia  Previous right lower lobe lung mass has diminished since July  Previous left upper lobe tumor has also decreased in size but appears more atelectatic  Increasing water attenuation pleural fluid, partially loculated pleural fluid in the left lower lung  Follow-up per cancer imaging protocol  Contusion of left lower leg  Assessment & Plan  Sustained during fall  X-ray of right ankle is negative for fracture, however showing linear calcifications associated with the plantar fascia and Achilles tendon, these are chronic  Buttocks Hematoma  Assessment & Plan  Sustained during fall  CT showing Suspected small post traumatic hematoma measuring 2 cm in the soft tissues of the right buttock/perianal region  No active bleeding  Stable H&H  No overt pain complaints     Chemotherapy-induced thrombocytopenia-resolved as of 1/17/2022  Assessment & Plan  Resolved  1/17 Plt 233  UTI (urinary tract infection)-resolved as of 1/17/2022  Assessment & Plan  Resolved  Diagnosed in acute care  Urine culture + E  Coli   Treated with 5 days antibiotics Ceftriaxone and Keflex  Also was on oral vancomycin to prevent recurrent C  Diff  Asymptomatic     Urinary retention-resolved as of 1/17/2022  Assessment & Plan  Likely related to UTI  Bonner catheter inserted 1/8/22   Bonner removed 1/14/22 ; straight cath x 1   Patient is now continent all day 1/15/22   Retention has resolved  Health Maintenance  #Delirium/Sleep: At high risk  Optimize bowel/bladder/pain management   Environmental interventions - avoid physical and chemical restraint  Redirect  #Pain: Tylenol PRN  #Bowel: Last BM 1/18, rectal suppository and miralax ordered PRN  #Bladder:  Voiding and continent  #Skin/Pressure Injury Prevention: Turn Q2hr in bed, with weight shifts [id number] in wheelchair  #DVT Prophylaxis: Fully anticoagulated on eliquis, SCDs  #GI Prophylaxis: Protonix  #Code Status:  Full Code  #FEN:  Diabetic diet  #Dispo:  Team 1/20: ADD 1/21 to home with home PT/OT/SLP/RN  Objective:    Functional Update:  PT: Sup transfers, modA bed mobility, Sup ambulation 250' with RW, Sup bilateral hand rails  OT:  Set-up eating, Sup grooming, Sup bathing, Sup UB/LB dressing, Sup toileting, Sup tub/shower transfers, Sup toilet transfers   SLP:  Decreased executive function skills (problem solving, reasoning, sequencing, judgment, insight), decreased ST/working memory, fatigue, and slower processing  Allergies per EMR    Physical Exam:  Temp:  [96 5 °F (35 8 °C)-97 °F (36 1 °C)] (P) 96 5 °F (35 8 °C)  HR:  [55-62] (P) 55  Resp:  [18] (P) 18  BP: (127-140)/(63-70) (P) 158/70  Oxygen Therapy  SpO2: (P) 99 %    Gen: No acute distress, Well-nourished, well-appearing  HEENT: Moist mucus membranes, Normocephalic/Atraumatic  Cardiovascular: Regular rate, rhythm, S1/S2  Distal pulses palpable  Heme/Extr: stable BL LE edema   Pulmonary: Non-labored breathing  Lungs CTAB  : No bahena  GI: Soft, non-tender, non-distended  BS+  MSK: ROM is WFL in all extremities  No effusions or deformities  Bulk is symmetric  See below for MMT scores  Integumentary: Skin is warm, dry  Neuro: AAOx3, Speech is intact  Appropriate to questioning  Tone is normal    Psych: Normal mood and affect  Diagnostic Studies: Reviewed, no new imaging      Laboratory:  Reviewed   Results from last 7 days   Lab Units 01/20/22  0549 01/17/22  0603   HEMOGLOBIN g/dL 9 7* 10 6*   HEMATOCRIT % 30 9* 32 5*   WBC Thousand/uL 5 00 7 80     Results from last 7 days   Lab Units 01/20/22  0549 01/17/22  0603 01/14/22  0434   BUN mg/dL 29* 34* 38*   POTASSIUM mmol/L 4 3 4 0 3 8   CHLORIDE mmol/L 108 107 103   CREATININE mg/dL 1 53* 1 69* 1 75*            Patient Active Problem List   Diagnosis    Buttocks Hematoma    Contusion of left lower leg    Lung cancer ([facility] [address] )    Chronic kidney disease, stage 3 (HCC)    Hypertension    Diabetes type 2, controlled ([facility] )    Depression    Hypokalemia    Atrial fibrillation (HCC)    Hypomagnesemia    Ground glass opacity present on imaging of lung    Acute ischemic stroke (HCC)    Encephalopathy    Confusion    Diastolic congestive heart failure (HCC)    GERD (gastroesophageal reflux disease)    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HCC)    Dyslipidemia    Lethargy         Medications  Current Facility-Administered Medications   Medication Dose Route Frequency Provider Last Rate    acetaminophen  650 mg Oral Q6H PRN Gideon Edge MD      apixaban  5 mg Oral BID Gideon Edge MD      aspirin  81 mg Oral Daily Gideon Edge MD      atorvastatin  80 mg Oral Daily With Fabiola Alegre MD      bisacodyl  10 mg Rectal Daily PRN Gideon Edge MD      busPIRone  7 5 mg Oral BID Gideon Edge MD      cholecalciferol  2,000 Units Oral Daily Gideon Edge MD      cyanocobalamin  500 mcg Oral Daily Gideon Edge MD      Diclofenac Sodium  2 g Topical TID PRN Freda Smart MD      docusate sodium  100 mg Oral BID PRN Gideon Edge MD      folic acid  1 mg Oral Daily Gideon Edge MD      insulin lispro  1-6 Units Subcutaneous TID AC EDUARDO Huynh      loratadine  10 mg Oral Daily Gideon Edge MD      losartan  25 mg Oral Daily EDUARDO Huynh      metoprolol tartrate  25 mg Oral Q12H [facility] Gideon Edge MD      ondansetron  4 mg Oral Q6H PRN Gideon Edge MD      pantoprazole  40 mg Oral Early Morning Gideon Edge MD      polyethylene glycol  17 g Oral Daily PRN Gideon Edge MD      sitaGLIPtin  25 mg Oral Daily Ilana Gibson MD      sotalol  80 mg Oral BID Aydin White MD      venlafaxine  37 5 mg Oral HS Aydin White MD          ** Please Note: Fluency Direct voice to text software may have been used in the creation of this document   **

## 2022-01-20 NOTE — TEAM CONFERENCE
Acute RehabilitationTeam Conference Note  Date: 1/20/2022   Time: 1:12 PM       Patient Name:  Mesha Del Castillo Record Number: [id number]   YOB: 1950  Sex: Female          Room/Bed:  [facility] [place] 268/[facility] [place] 165-83  Payor Info:  Payor: MEDICARE / Plan: MEDICARE A AND B / Product Type: Medicare A & B Fee for Service /      Admitting Diagnosis: Infarction of right basal ganglia ([facility] ) [I63 9]   Admit Date/Time:  1/11/2022  3:39 PM  Admission Comments: No comment available     Primary Diagnosis:  Acute ischemic stroke ([facility] )  Principal Problem: Acute ischemic stroke [facility])    Patient Active Problem List    Diagnosis Date Noted    Dyslipidemia 01/12/2022    Lethargy 68/28/3714    Diastolic congestive heart failure ([facility] ) 01/11/2022    GERD (gastroesophageal reflux disease) 01/11/2022    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[facility] ) 01/11/2022    Encephalopathy 01/06/2022    Confusion     Acute ischemic stroke ([facility] ) 01/05/2022    Hypomagnesemia 01/04/2022    Ground glass opacity present on imaging of lung 01/04/2022    Buttocks Hematoma 01/03/2022    Contusion of left lower leg 01/03/2022    Lung cancer ([facility] ) 01/03/2022    Chronic kidney disease, stage 3 ([facility] ) 01/03/2022    Hypertension 01/03/2022    Diabetes type 2, controlled ([facility] ) 01/03/2022    Depression 01/03/2022    Hypokalemia 01/03/2022    Atrial fibrillation ([facility] ) 01/03/2022       Physical Therapy:    Weight Bearing Status: Full Weight Bearing  Transfers: Supervision  Bed Mobility: Moderate Assistance  Amulation Distance (ft): 250 feet  Ambulation: Supervision  Assistive Device for Ambulation: Roller Walker  Number of Stairs: 8  Assistive Device for Stairs: Bilateral [employer]  Stair Assistance: Supervision  Ramp: Supervision  Assistive Device for Ramp: Roller Walker  Discharge Recommendations: Home with:  [address] with[de-identified] Family Support,24 Hour Supervision    Pt 66 yr old female presented to [facility] 1/3/22 s/p multiple falls at home  With extensive testing, pt was found to have a R buttock hematoma, scattered opacities in B lungs (pt with h/o stage 4 lung CA, not on O2- gets chemo every 3 weeks), +toxic metabolic encephalopathy with worsening confusion  +UTI, and MRI revealed +R BG/caudate infarct/ischemia- likely attributed afib/hypercoaguable state due to malignancy, ongoing urinary retention requiring bahena placement, bahena remains at time of eval  Other PMH: afib, HTN, HLD, CHF, GERD, CKD3, DM2, B knee OA  At baseline 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, use of kelley and pt A with all ADLs  Pts cousin currently is staying with brother and plans to stay until end of the month  Overall pt is poor historian, poor recall of recent events, does recall falling several times  Split level home with 0 MARY JANE, 6 steps inside with B HR to living space there is also an elevator for her brother  Pt was I PTA without AD, however reports ongoing functional decline with chemo txs over passed several weeks  Pt with slowed movements and responses, evident lack of insight to deficits/impaired judgement  Unable to recall having bahena and insistent on using BR to void  Pt presents needing min A for gait and transfers with RW w/ repeated cues for hand placement and safety  Pt to greatly benefit from skilled PT intervention to maximize functional strength and mobility to improve independence  Education provided on status, ELOS, goals, and POC, pt and friend at bedside verablize understanding  Pt demonstrates fair rehab potential to reach S goals (pending support at home at time of DC) in ELOS x2 weeks  Anticipate Home PT to be recommended, pt has RW and SPC      1-19-22   Pt continues to make progress with balance and ambulation in stroke rehab but limited due to cognition and L chronic knee pain  Pt is mostly S level for functional mobility needed for safety and cognition   Family will provide   Pt is scheduled for D/C Friday and set up for outpatient therapy to continue neuro rehab  Occupational Therapy:  Eating:  (setup)  Grooming: Supervision  Bathing: Supervision  Bathing: Supervision  Upper Body Dressing: Supervision  Lower Body Dressing: Supervision  Toileting: Supervision  Tub/Shower Transfer: Supervision  Toilet Transfer: Supervision  Cognition: Exceptions to WNL  Cognition: Decreased Memory,Decreased Executive Functions,Decreased Attention,Decreased Comprehension,Decreased Safety,Impulsive  Orientation: Person,Situation  Discharge Recommendations: Home with:  [address] with[de-identified] 24 Hour Supervision,24 Hour Assistance,Family Support,First Floor Setup,Home Occupational Therapy       1/19/22  ADL: S for all ADLs  TRANSFER: S for transfers with RW  D/C PLAN: OT goals set for S at IE  During course of day and further evaluation, pt demonstrating severe cog deficits as evidenced by pt scoring 8/30 on MOCA  Dr Nena Aly and Therapist spoke with dtr last week who reported 24/7 can be provided  Therapist circled back with dtr on 1/17/22 which dtr reports she will be taking FMLA for 2 weeks and staying with pt  DC 1/21/22, home OT services  Pt barriers include dec cognition, dec balance, dec activity tolerance, dec overall strength, impacting participation in ADL/IADL's  In order to address fx deficits, skilled OT services have worked on self-care, therapeutic exercise, therapeutic activity, modalities PRN in order to inc fx performance and independence  Therapist has discussed POC with pt and areas of focus with pt verbalizing understanding  Barriers Intervention   cognition Education family, 24/7 supervision   Dec activity tolerance Endurance trg   Dec strength TE           1/12/22  Pt is a 66year old female that presented to [address] [place] [address] on 1/3/2022 as a level B trauma s/p multiple falls at home  Patient states she did hit her head during several falls, however denies LOC   Patient with complaints of right ankle pain  Right ankle x-ray was negative  CTH and CT cervical spine was negative for acute findings  CT of the chest/abdomen/pelvis showed suspected small post traumatic hematoma measuring 2cm in soft tissues of right buttock/perianal region; no active bleeding; scattered groundglass opacities in both lungs  COVID test resulted negative on 1/3/2022  Patient was continued on Eliquis and initiated on multimodal pain regimen for acute pain management  SLIM was consulted regarding suspected toxic metabolic encephalopathy, as patient with noted worsening confusion  Urinalysis was abnormal, and patient was initiated on Cipro, then transitioned to Rocephin  Of note, patient with recent Cdiff + October 2021, and was continued on prophylactic Vanco in setting of antibiotics for UTI  Urine culture showed E coli and patient was transitioned back to PO Keflex to complete 5 days treatment  Neurology was consulted, as MRI of the brain showed right basal ganglia/caudate infarct/ischemia  Likely etiology Afib/hypercoagulable state from malignancy  ECHO showed an EF of 60%, grade 1 abnormal relaxation, concentric remodeling, mild AVR  Repeat MRI of the brain on 1/5 showed no enhancing lesions  EEG was also obtained, which showed nonspecific slowing and no seizure activity  Additionally, patient was with noted new onset urinary retention and required bahena insertion on 1/8/2022  Of note, patient with history of metastatic lung cancer, and receives chemotherapy every 3 weeks  Pt supine in bed upon therapist arrival  Pt fatigued with eyes closed while answering home setup questions  At times pt demonstrating to be P historian  Pt reports that dtr is back in school, but when asked what she is studying pt unable to give clear answer and reporting "She is just doing it to get by"  Pt reports she lives in a split level home 0STE and 6 steps inside with HR   Pt reports she was indep with all ADL/IADL tasks and is main caregiver for brother who has muscular dystrophy, she utilized mechanical lift for transfers  Of note, in chart it is stated that pts cousin is staying with them to A and that dtr is looking ot hire [employer] for DC, will verify with dtr as well  At this time pt req Reta/modA for ADLs and transfers  Pt does demonstrate dec cognition, dec insight to deficit and dec safety awareness despite educ  Pt seen for 90mins of skilled OT services with emphasis on self-care, transfers, balance, as well as therapist educated pt on rehab process, goal setting and ELOS which pt verbalized understanding  Pt presents with the following performance component deficits impacting ADL/IADL skills:  weakness, impaired balance, decreased endurance, decreased coordination, increased fall risk, new onset of impairment of functional mobility, decreased ADLS, decreased IADLS, decreased activity tolerance, decreased safety awareness, impaired judgement, dec cognition and SOB upon exertion, that result in activity limitations and/or participation restrictions  Pt to continue to benefit from continued acute rehab OT services during hospital stay to address defined deficits and to maximize level of functional independence in the following Occupational Performance areas: grooming, bathing/shower, toilet hygiene, dressing, medication management, functional mobility, community mobility, clothing management, cleaning, meal prep and household maintenance  Treatment approaches may include: OT eval, self-care, there ex, therapeutic activity, modalities  Pt presents with good rehab potential  This evaluation requires clinical decision making of high complexity, because the patient presents with comorbidites that affect occupational performance and required significant modification of tasks or assistance with consideration of multiple treatment options   Pt is unsafe to D/C home at this time, recommending ELOS 2 weeks to achieve S level goals with least restrictive device to address these areas and resume prior occupational roles to maximize independence to reduce risk of fall and decrease risk of readmission  Goals to continue to be assessed and determine if pt will have supervision at [place]  Speech Therapy:  Mode of Communication: Verbal  Cognition: Exceptions to WNL  Cognition: Decreased Memory,Decreased Executive Functions,Decreased Attention,Decreased Comprehension,Decreased Safety  Orientation: Person,Place,Time,Situation  Discharge Recommendations: Home with:  [address] with[de-identified] 24 Hour Supervision,24 Hour Assisteance,Family Support,Home Speech Therapy,Outpatient Speech Therapy  Pt completing formalized cognitive linguistic assessment, CLQT in which overall score was 2 2 out of 4 0 when compared to age matched peers between 65-80 year old  Pt while pt demonstrated deficits across all cognitive domains assessed, the most impacted were attention, executive function and visuospatial skills  Additionally noted during assessment was some L inattention to items on the pages during tasks, as well as significantly decreased comprehension given directions for symbol trails task which pt did not comprehend directions at all, noting impulsivity trying to write over SLP, etc  Pt exhibiting decreased awareness given ANY errors which presented during the assessment as well  When engaging in interview in addition to completing assessment, pt was accurate in eliciting reasoning for hospitalization due to her falls, but probing questions needed for true reasoning of "stroke " Pt was able to recall current place/city as well as current month but required cues for date stating "23 " Pt does report independence prior to admission, as 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 accessible house  Pt reported completing I ADL's such as cooking, cleaning, laundry, management of medications and driving, but brother takes care of finances   Pt is  and stated having supportive children, dtr and son, but also her friend, Suma Rubio (who was in visiting prior to initiation of session) also is very supportive and lives close to pt  Even in d/w pt's friend as she was leaving, pt did verbalize to her about leaving clothing on the door knob for her to bring in  Pt's friend noted to reorient pt to current situation as well  At this time, pt is noted to demonstrate cognitive linguistic deficits including the following: decreased comprehension, decreased executive function skills (problem solving, reasoning, sequencing, judgement, insight), decreased ST/working memory, fatigue, slower processing and suspected visual spatial difficulty, which currently impact pt's overall safety and functional mobility  While in the acute rehab center, pt will benefit from skilled SLP services to maximize overall functional independence of cognitive linguistic skills to decrease burden of care for family at time of discharge  Update from week of 1/19/2022: Pt conts to receive skilled SLP services targeting cognitive linguistic communication needs  Pt is making progress towards her goals and is currently functioning at supervision for expression and comprehension and mod A problem solving and memory  Pt conts with barriers in decreased executive function skills (problem solving, reasoning, sequencing, judgement, insight), decreased ST/working memory, fatigue, and slower processing which overall impact pt's safety and functional mobility  Recommendations at this time are for 24hr supervision/assistance at home  Pt is scheduled for discharge home with home PT/OT therapy services however would benefit from further skilled SLP services in outpt setting once ready to maximize overall functional independence of cognitive linguistic skills to decrease burden of care for family at time         Nursing Notes:  Appetite: Good  Diet Type: Diabetic,Thin Liquids Pain Location/Orientation: Orientation: Left,Location: Knee  Pain Score: 3                       Hospital Pain Intervention(s): Medication (See MAR)          Pt is 66 y o  female patient who originally presented to the hospital on 1/3/2022 due to multiple falls resulting in buttocks hematoma and leg contusion for which she was originally admitted under the Trauma Service  Due to complaints of headaches and altered mental status she was seen in consultation by Internal Medicine and was subsequently transferred to our service after an MRI of the brain revealed evidence of an acute ischemic stroke  She was seen in consultation by Neurology and had full workup  She was maintained on Eliquis which she was already on prior to the hospitalization but after discussion with her outpatient oncologist, aspirin was added to her regimen for concerned that she developed the stroke due to thrombosis related to her current chemotherapy regimen for lung cancer  She had initially been seen by PT and OT on admission and was cleared for home with VNA but subsequently did have a decline in her functionality in mental status while here in the hospital and then was recommended for rehab  In fact her hospital stay was quite complicated by alterations and fluctuations in her mental status  While here in the hospital she was treated for urinary tract infection and completed a 5 day course of antibiotics  Due to initial complaints of diarrhea we had requested a C diff sample but patient's diarrhea ceased and a stool sample could never be obtained  In addition she did have urinary retention for which a Bonner catheter had to be placed  Her blood pressure was initially quite severely elevated but given the acute stroke we allowed permissive hypertension initially and then subsequently we did bring her blood pressure under better control    She has underlying chronic kidney disease with some fluctuations but overall stability in her creatinine  She was found to have a ground-glass opacity in her lung on imaging but tested negative for COVID on 3 occasions during this hospital stay  At time of discharge she is overall improved though she does continue to have some episodic lethargy and confusion for which additional neurologic and neuropsych workup would be of benefit once she is medically optimized  Pt was admitted to [address] [place] [address]  1/11/22  Acute ischemic stroke managed with Lipitor 80mg daily with dinner, Eliquis 5mg BID & ASA 81mg daily  HTN managed with  Lopressor 25mg q12h  Pt had bahena catheter for urinary retention which was d/c'd last week and pt has been voiding eversince without difficulty  Atrial fib managed with Sotolol 80mg BID, Lopressor 25mg q12h & Eliquis 5mg BID  DM2 managed with diabetic diet & accu checks with SSI's before meals and Januvia 25 mg daily  Pt is on Voltaren gel for her L knee pain 3x daily but pt sometimes refuse if she has no pain  This week we will monitor pt's vital signs & lab results  Pt is disoriented to time & will be reoriented to time & situation with each encounter  Pt will have safe transfers with use of call bell, hourly rounding & bed/chair alarm to prevent falls  Pt uses call bell appropriately & has not challenged bed alarm  Pt will be educated on importance of T/R & off loading weight while in bed & chair to prevent pressure injury  Pt will have adequate pain relief to fully participate in therapies  Pt denies pain @ present  Pt will also be educated on energy conservation as we encourage independence with ADL's  Case Management:     Discharge Planning  Living Arrangements: Lives w/ Family members  Support Systems: Son,Daughter  Assistance Needed: unable to assess  Type of Current Residence: Private residence  Current Home Care Services: No  Pt is participating in therapy  She lives in a single family home with her brother  HSe was independent prior to admission   She can live on 1 level and has no stairs to enter  Pt's cousin is currently staying with her brother whom pt cares for but this is temporary team reports  Pt's dtr is looking into 24/7 HA upon discharge  PT has grab bars, a shower chair, roller walker and a mechanical lift  Pt is scheduled for d/c on 1/21 with 24/7 support and home PT, OT, and RN through [facility]  Following to assist w/ d/c planning needs  Is the patient actively participating in therapies? yes  List any modifications to the treatment plan:     Barriers Interventions   stroke Aspirin, rehab   LLE weakness Strengthening, therapy exercises   Safety awareness Education, 24/7 supervision   Fatigue Energy conservation   Cognitive deficits Recommendation for 24/7 supervision, mental status has improved     Knee osteoarthritis  Topical medication     Is the patient making expected progress toward goals? yes  List any update or changes to goals:     Medical Goals: Patient will be medically stable for discharge to [facility] upon completion of rehab program and Patient will be able to manage medical conditions and comorbid conditions with medications and follow up upon completion of rehab program    Weekly Team Goals:   Rehab Team Goals  ADL Team Goal: Patient will require supervision with ADLs with least restrictive device upon completion of rehab program  Transfer Team Goal: Patient will require supervision with transfers with least restrictive device upon completion of rehab program  Locomotion Team Goal: Patient will require supervision with locomotion with least restrictive device upon completion of rehab program  Cognitive Team Goal: Patient will require supervision for basic and complex tasks upon completion of rehab program    Discussion: Pt presents the above barriers  Dtr is taking leave of absence from work and providing 24/7 supervision   She is functioning at mod a for problem solving and memory and she is supervision and expression and comprehension  She is supervision for ADLs  She is ambulation 300 feet with walker and supervision/ mod I levels  She will go home tomorrow with home PT, OT, RN and SLP  Anticipated Discharge Date:  1/21  [facility] Team Members Present: The following team members are supervising care for this patient and were present during this Weekly Team Conference      Physician: Dr Patti Shin MD  : SANAZ Kevin  Registered Nurse: Rey Noel  Physical Therapist: Crow Castellanos PT  Occupational Therapist: Anita Malloy MS, OTR/L and Anand Hoyos, MS, OTR/L

## 2022-01-20 NOTE — PROGRESS NOTES
01/20/22 0900   Pain Assessment   Pain Assessment Tool 0-10   Pain Score 3   Pain Location/Orientation Orientation: Left; Location: Knee   Restrictions/Precautions   Precautions Cognitive;Bed/chair alarms; Fall Risk   Cognition   Orientation Level Oriented to person;Oriented to situation;Disoriented to time;Oriented to time   Roll Left and Right   Type of Assistance Needed Independent   Roll Left and Right CARE Score 6   Sit to Lying   Type of Assistance Needed Independent   Sit to Lying CARE Score 6   Lying to Sitting on Side of Bed   Type of Assistance Needed Independent   Lying to Sitting on Side of Bed CARE Score 6   Sit to Stand   Type of Assistance Needed Supervision   Comment DS  to RW   Sit to Stand CARE Score 4   Bed-Chair Transfer   Type of Assistance Needed Supervision   Comment DS with RW   Chair/Bed-to-Chair Transfer CARE Score 4   Car Transfer   Reason if not Attempted Environmental limitations   Car Transfer CARE Score 10   Walk 10 Feet   Type of Assistance Needed Supervision   Comment RW   Walk 10 Feet CARE Score 4   Walk 50 Feet with Two Turns   Type of Assistance Needed Supervision   Comment RW   Walk 50 Feet with Two Turns CARE Score 4   Walk 150 Feet   Type of Assistance Needed Supervision   Comment RW   Walk 150 Feet CARE Score 4   Walking 10 Feet on Uneven Surfaces   Type of Assistance Needed Supervision   Comment RW  on ramp   Walking 10 Feet on Uneven Surfaces CARE Score 4   Ambulation   Does the patient walk? 2  Yes   Primary Mode of Locomotion Prior to Admission Walk   Distance Walked (feet) 250 ft  (225, 150)   Assist Device Roller [employer] 50 Feet with Two Turns   Reason if not Attempted Activity not applicable   Wheel 50 Feet with Two Turns CARE Score 9   Wheel 150 Feet   Reason if not Attempted Activity not applicable   Wheel 470 Feet CARE Score 9   Wheelchair mobility   Does the patient use a wheelchair? 0   No   Curb or Single Stair   Style negotiated Curb   Type of Assistance Needed Supervision   Comment RW  up/down 8'' curb step   1 Step (Curb) CARE Score 4   4 Steps   Type of Assistance Needed Supervision   Comment bilat HR   4 Steps CARE Score 4   12 Steps   Reason if not Attempted Activity not applicable   12 Steps CARE Score 9   Stairs   Type Stairs   # of Steps 8   Assist Devices Bilateral Rail   Picking Up Object   Type of Assistance Needed Supervision   Comment with reacher   Picking Up Object CARE Score 4   Therapeutic Interventions   Strengthening HEP seated LAQ, Hip flex,  Hip add isometric,  Repeated sit-stands with hands on lap, Hip abd blue Tband , ankle DF/ PF all 10x3   Equipment Use   [employer] 12mins for therapuetic conditioning   Assessment   Treatment Assessment 90 min session focused on capture of care scores and all functional mobility of steps/ curb/ ramp/ item / and ambulation bouts  Pt is showing steady gait and safe with walker but will need S due to dec cognition  Pt performed HEP and was provided with written hand out  Pt has no questions or concerns at this time for D/C tomorrow  Recommendation   PT Discharge Recommendation Home with home health rehabilitation   PT Therapy Minutes   PT Time In 0900   PT Time Out 1030   PT Total Time (minutes) 90   PT Mode of treatment - Individual (minutes) 90   PT Mode of treatment - Concurrent (minutes) 0   PT Mode of treatment - Group (minutes) 0   PT Mode of treatment - Co-treat (minutes) 0   PT Mode of Treatment - Total time(minutes) 90 minutes   PT Cumulative Minutes 750   Therapy Time missed   Time missed?  No

## 2022-01-20 NOTE — PLAN OF CARE
Problem: Potential for Falls  Goal: Patient will remain free of falls  Description: INTERVENTIONS:  - Educate patient/family on patient safety including physical limitations  - Instruct patient to call for assistance with activity   - Consult OT/PT to assist with strengthening/mobility   - Keep Call bell within reach  - Keep bed low and locked with side rails adjusted as appropriate  - Keep care items and personal belongings within reach  - Initiate and maintain comfort rounds  - Make Fall Risk Sign visible to staff  - Offer Toileting every 2 Hours, in advance of need  - Initiate/Maintain bed/chair alarm  - Obtain necessary fall risk management equipment: RW  - Apply yellow socks and bracelet for high fall risk patients  - Consider moving patient to room near nurses station  Outcome: Progressing

## 2022-01-20 NOTE — ASSESSMENT & PLAN NOTE
Continue home regimen of Losartan 50mg daily and metoprolol tartrate 25mg BID  Started on amlodipine 5mg BID in acute setting - continue at this time  Started on Lasix 40mg daily in acute setting - only taking as needed at home  Currently on hold  Losartan restarted on 1/11 - decreased dose to 25mg daily on 1/12 due to lethargy/possible hypoperfusion  Decreased amlodipine to 5mg at HS on 1/14  Discontinued on 1/18  Monitor BP with VS   Holding parameters for SBP <130  Continue to follow-up with Dr Paolo Rose as outpatient

## 2022-01-20 NOTE — PROGRESS NOTES
[facility]  Progress Note Mayra Mo 1950, 66 y o  female MRN: [id number]  Unit/Bed#: -39 Encounter: [id number]  Primary Care Provider: Tiera Noriega DO   Date and time admitted to hospital: 1/11/2022  3:39 PM    Lethargy  Assessment & Plan  Difficulty maintaining conversation on 1/12 - falling asleep multiple times during exam   Admits to snoring - overnight noc ox ordered for 1/12 - lowest saturation 88% for 1 minute  Does not qualify for oxygen  CT head on 1/12: "No new intracranial abnormality  Continued evolution of late subacute to chronic right basal ganglia infarct  No hemorrhage or significant mass effect "  Frequent neuro-checks  Per family, pt has been experiencing more daytime sleepiness since starting chemo  Dyslipidemia  Assessment & Plan  Started on Lipitor 80mg daily in acute setting - continue  Lipid panel on 1/5: Cholesterol 201, Triglycerides 252, HDL 41,   GERD (gastroesophageal reflux disease)  Assessment & Plan  Stable  Continue Protonix 40mg daily as substitute for non-formulary Prilosec 20mg daily  Atrial fibrillation (HCC)  Assessment & Plan  Continue metoprolol tartrate 25mg BID and sotalol 80mg BID for rate control  Continue Eliquis 5mg BID for anticoagulation  Monitor routine VS   Replete electrolytes as needed  Continue to follow-up with Dr Adilia Lea as outpatient  Depression  Assessment & Plan  Stable  Continue Buspar 7 5mg Q12 and Effexor 37 5mg daily  Supportive counseling as needed  Follows with Palliative as outpatient  Diabetes type 2, controlled [facility])  Assessment & Plan  Lab Results   Component Value Date    HGBA1C 5 8 (H) 01/05/2022       Recent Labs     01/19/22  1117 01/19/22  1619 01/19/22 2019 01/20/22  0610   POCGLU 238* 86 137 120       Blood Sugar Average: Last 72 hrs:  (P) 015 [id number]     A1c 5 8 on 1/5  Home regimen: metformin 1000mg BID and Tradjenta 5mg daily    Currently on SSI - increased to algorithm 3 on 1/13  Creatinine clearance 35  Hold on metformin  Started on Januvia 25mg daily on 1/18 - transition to [address] at discharge  Continue QID accuchecks and cons  carb diet  Continue to follow-up with PCP as outpatient  Hypertension  Assessment & Plan  Continue home regimen of Losartan 50mg daily and metoprolol tartrate 25mg BID  Started on amlodipine 5mg BID in acute setting - continue at this time  Started on Lasix 40mg daily in acute setting - only taking as needed at home  Currently on hold  Losartan restarted on 1/11 - decreased dose to 25mg daily on 1/12 due to lethargy/possible hypoperfusion  Decreased amlodipine to 5mg at HS on 1/14  Discontinued on 1/18  Monitor BP with VS   Holding parameters for SBP <130  Continue to follow-up with Dr Amy Hudson as outpatient  Chronic kidney disease, stage 3 [facility])  Assessment & Plan  Lab Results   Component Value Date    EGFR 33 01/20/2022    EGFR 29 01/17/2022    EGFR 28 01/14/2022    CREATININE 1 53 (H) 01/20/2022    CREATININE 1 69 (H) 01/17/2022    CREATININE 1 75 (H) 01/14/2022     Creatinine currently 1 53  Baseline appears to be 1 5-1 8  Avoid nephrotoxins  Encourage adequate hydration  Appears euvolemic  Lasix on hold  Continue to trend routine BMP  Previously seen by Dr Madelyn Ivey (Nephrology) as an outpatient  Lung cancer [facility])  Assessment & Plan  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  Currently receiving chemotherapy: Ramucirumab + Docetaxel Q21 days  Will need to restart after discharge from [facility]  Follows with Dr Cris Arceo (Heme/Onc) as outpatient  * Acute ischemic stroke [facility])  Assessment & Plan  1/3 - admitted with AMS and multiple falls  CT head on 1/3: No acute intracranial abnormalities  MRI brain on 1/4: R basal ganglia and R caudate acute ischemia  MRI brain with contrast on 1/5: No enhancing lesions  Repeat CT head on 1/6: Evolving R basal ganglia infarct  No hemorrhage or significant mass effect  ECHO on  - EF 60% with grade 1 relaxation  EEG on  - Non-specific  Continue Lipitor 80mg daily and ASA 81mg daily  Likely related to small vessel disease  Maintain normotension  Neurovascular checks Q shift  Follow-up with Neurology as outpatient  PT/OT/Speech therapies  Primary team following  VTE Pharmacologic Prophylaxis:   Pharmacologic: Apixaban (Eliquis)  Mechanical VTE Prophylaxis in Place: Yes - sequential compression devices  Current Length of Stay: 9 day(s)    Current Patient Status: Inpatient Rehab     Discharge Plan: As per primary team     Code Status: Level 3 - DNAR and DNI    Subjective:   Pt examined while pt sitting in recliner in pt room  Plans for discharge tomorrow  Reviewed medications with pt - currently has no questions or concerns  Has no concerns or complaints currently  Feels ready to be going home  Objective:     Vitals:   Temp (24hrs), Av 8 °F (36 °C), Min:96 4 °F (35 8 °C), Max:97 °F (36 1 °C)    Temp:  [96 4 °F (35 8 °C)-97 °F (36 1 °C)] 96 9 °F (36 1 °C)  HR:  [57-62] 62  Resp:  [18] 18  BP: (109-140)/(55-70) 127/68  SpO2:  [95 %-98 %] 98 %  Body mass index is 28 34 kg/m²  Review of Systems   Constitutional: Negative for appetite change, chills, fatigue and fever  Respiratory: Negative for cough and shortness of breath  Cardiovascular: Negative for chest pain, palpitations and leg swelling  Gastrointestinal: Negative for abdominal distention, abdominal pain, constipation, diarrhea, nausea and vomiting  LBM    Genitourinary: Negative for difficulty urinating  Musculoskeletal: Negative for arthralgias and back pain  Neurological: Negative for dizziness, weakness, light-headedness, numbness and headaches  Psychiatric/Behavioral: Negative for sleep disturbance  Input and Output Summary (last 24 hours):        Intake/Output Summary (Last 24 hours) at 2022 4816  Last data filed at 1/20/2022 0357  Gross per 24 hour   Intake 920 ml   Output 500 ml   Net 420 ml       Physical Exam:     Physical Exam  Vitals and nursing note reviewed  Constitutional:       Appearance: Normal appearance  HENT:      Head: Normocephalic and atraumatic  Cardiovascular:      Rate and Rhythm: Normal rate and regular rhythm  Pulses: Normal pulses  Heart sounds: Normal heart sounds  No murmur heard  No friction rub  Pulmonary:      Effort: Pulmonary effort is normal  No respiratory distress  Breath sounds: Normal breath sounds  No wheezing or rhonchi  Abdominal:      General: Abdomen is flat  Bowel sounds are normal  There is no distension  Palpations: Abdomen is soft  Tenderness: There is no abdominal tenderness  Musculoskeletal:      Cervical back: Normal range of motion and neck supple  Right lower leg: Edema (Minimal non-pitting edema) present  Left lower leg: Edema (Minimal non-pitting edema) present  Skin:     General: Skin is warm and dry  Neurological:      Mental Status: She is alert and oriented to person, place, and time     Psychiatric:         Mood and Affect: Mood normal          Behavior: Behavior normal          Additional Data:     Labs:    Results from last 7 days   Lab Units 01/20/22  0549   WBC Thousand/uL 5 00   HEMOGLOBIN g/dL 9 7*   HEMATOCRIT % 30 9*   PLATELETS Thousands/uL 212   NEUTROS PCT % 66*   LYMPHS PCT % 16*   MONOS PCT % 8   EOS PCT % 8*     Results from last 7 days   Lab Units 01/20/22  0549   SODIUM mmol/L 138   POTASSIUM mmol/L 4 3   CHLORIDE mmol/L 108   CO2 mmol/L 29   BUN mg/dL 29*   CREATININE mg/dL 1 53*   ANION GAP mmol/L 1*   CALCIUM mg/dL 8 6   GLUCOSE RANDOM mg/dL 119*         Results from last 7 days   Lab Units 01/20/22  0610 01/19/22  2019 01/19/22  1619 01/19/22  1117 01/19/22  0559 01/18/22  2045 01/18/22  1613 01/18/22  1118 01/18/22  0557 01/17/22  1608 01/17/22  1142 01/17/22  0610   POC GLUCOSE mg/dl 120 137 86 238* 131 163* 137 199* 119 109 264* 144*               Labs reviewed    Imaging:    Imaging reviewed    Recent Cultures (last 7 days):           Last 24 Hours Medication List:   Current Facility-Administered Medications   Medication Dose Route Frequency Provider Last Rate    acetaminophen  650 mg Oral Q6H PRN Aydin White MD      apixaban  5 mg Oral BID Aydin White MD      aspirin  81 mg Oral Daily Aydin White MD      atorvastatin  80 mg Oral Daily With Xochitl Randall MD      bisacodyl  10 mg Rectal Daily PRN Aydin White MD      busPIRone  7 5 mg Oral BID Aydin White MD      cholecalciferol  2,000 Units Oral Daily Aydin White MD      cyanocobalamin  500 mcg Oral Daily Aydin White MD      Diclofenac Sodium  2 g Topical TID PRN Ilana Gibson MD      docusate sodium  100 mg Oral BID PRN Aydin White MD      folic acid  1 mg Oral Daily Aydin White MD      insulin lispro  1-6 Units Subcutaneous TID AC EDUARDO Payton      loratadine  10 mg Oral Daily Aydin White MD      losartan  25 mg Oral Daily EDUARDO Payton      metoprolol tartrate  25 mg Oral Q12H [address] Aydin White MD      ondansetron  4 mg Oral Q6H PRN Aydin White MD      pantoprazole  40 mg Oral Early Morning Aydin White MD      polyethylene glycol  17 g Oral Daily PRN Aydin White MD      sitaGLIPtin  25 mg Oral Daily Ilana Gibson MD      sotalol  80 mg Oral BID Aydin White MD      venlafaxine  37 5 mg Oral HS Aydin White MD          M*Modal software was used to dictate this note  It may contain errors with dictating incorrect words or incorrect spelling  Please contact the provider directly with any questions

## 2022-01-20 NOTE — ASSESSMENT & PLAN NOTE
Lab Results   Component Value Date    EGFR 33 01/20/2022    EGFR 29 01/17/2022    EGFR 28 01/14/2022    CREATININE 1 53 (H) 01/20/2022    CREATININE 1 69 (H) 01/17/2022    CREATININE 1 75 (H) 01/14/2022     Creatinine currently 1 53  Baseline appears to be 1 5-1 8  Avoid nephrotoxins  Encourage adequate hydration  Appears euvolemic  Lasix on hold  Continue to trend routine BMP  Previously seen by Dr Garlon Lesch (Nephrology) as an outpatient

## 2022-01-20 NOTE — ASSESSMENT & PLAN NOTE
Lab Results   Component Value Date    HGBA1C 5 8 (H) 01/05/2022       Recent Labs     01/19/22  1117 01/19/22  1619 01/19/22 2019 01/20/22  0610   POCGLU 238* 86 137 120       Blood Sugar Average: Last 72 hrs:  (P) 088 [id number]     A1c 5 8 on 1/5  Home regimen: metformin 1000mg BID and Tradjenta 5mg daily  Currently on SSI - increased to algorithm 3 on 1/13  Creatinine clearance 35  Hold on metformin  Started on Januvia 25mg daily on 1/18 - transition to [facility] [address] at discharge  Continue QID accuchecks and cons  carb diet  Continue to follow-up with PCP as outpatient

## 2022-01-20 NOTE — CASE MANAGEMENT
Cm called pt's dtr to review team meeting update  She did not have any additional questions  She will arrive about 3 pm for d/c  Cm added SLP to referral request to  [facility] for a total of RN, PT, OT, and SLP services  Following to assist with d/c planning needs

## 2022-01-20 NOTE — PROGRESS NOTES
01/20/22 0700   Pain Assessment   Pain Assessment Tool 0-10   Pain Score No Pain   Restrictions/Precautions   Precautions Bed/chair alarms;Cognitive; Fall Risk;Supervision on toilet/commode   Weight Bearing Restrictions No   ROM Restrictions No   Lifestyle   Autonomy Pt agreeable to participate in Tx session  Eating   Type of Assistance Needed Independent   Physical Assistance Level No physical assistance   Eating CARE Score 6   Oral Hygiene   Type of Assistance Needed Supervision   Physical Assistance Level No physical assistance   Comment S in stance at sink top   Oral Hygiene CARE Score 4   Grooming   Able To Initiate Tasks;Comb/Brush Hair;Brush/Clean Teeth;Wash/Dry Hands   Limitation Noted In Strength   Findings S in stance at sink to perform grooming routine  Shower/Bathe Self   Type of Assistance Needed Supervision   Physical Assistance Level No physical assistance   Comment Pt performed shower ADL dmeonstrating ability to bathe 10/10 parts  Pt utilized shower chair with back to perform majority of shower seated  Utilized grab bar for support while in stance to bathe carolyn/buttocks  Pt reports having shower seat and grab bars in shower at home  Shower/Bathe Self CARE Score 4   Bathing   Assessed Bath Style Shower   Anticipated D/C Bath Style Shower   Able to Imtiaz Anish Yes   Able to [employer] Temperature Yes   Able to Wash/Rinse/Dry (body part) Right Arm;Left Arm;L Upper Leg;R Upper Leg;L Lower Leg/Foot;R Lower Leg/Foot;Chest;Abdomen;Perineal Area; Buttocks   Limitations Noted in Balance   Positioning Seated;Standing   Adaptive Equipment Shower Bars; Shower Seat;Hand [employer]   Limitations Noted In Balance; Safety   Adaptive Equipment Grab Bars;Seat with Back   Assessed Shower   Findings S to perform shower transfer with use of grab bar for support     Upper Body Dressing   Type of Assistance Needed Supervision   Physical Assistance Level No physical assistance   Comment Seated to don [place] shirt   Upper Body Dressing CARE Score 4   Lower Body Dressing   Type of Assistance Needed Supervision   Physical Assistance Level No physical assistance   Comment Seated to thread BLE's into under garment and pants  S in stance at RW to complete CM up over hips with no LOB   Lower Body Dressing CARE Score 4   Putting On/Taking Off Footwear   Type of Assistance Needed Supervision   Physical Assistance Level No physical assistance   Comment S to don socks and shoes   Putting On/Taking Off Footwear CARE Score 4   Picking Up Object   Type of Assistance Needed Supervision; Adaptive equipment   Physical Assistance Level No physical assistance   Comment S with RW and utilizing LHR to retrieve items from flr level   Picking Up Object CARE Score 4   Lying to Sitting on Side of Bed   Type of Assistance Needed Supervision   Physical Assistance Level No physical assistance   Comment S HOB flat without use of BR   Lying to Sitting on Side of Bed CARE Score 4   Sit to Stand   Type of Assistance Needed Supervision; Adaptive equipment   Physical Assistance Level No physical assistance   Comment RW   Sit to Stand CARE Score 4   Bed-Chair Transfer   Type of Assistance Needed Supervision; Adaptive equipment   Physical Assistance Level No physical assistance   Comment S with RW for ADL transfers and fxnl mobility   Chair/Bed-to-Chair Transfer CARE Score 4   Toileting Hygiene   Type of Assistance Needed Supervision; Adaptive equipment   Physical Assistance Level No physical assistance   Comment S in stance at RW to complete CM and hygiene task   Toileting Hygiene CARE Score 4   Toilet Transfer   Type of Assistance Needed Supervision; Adaptive equipment   Physical Assistance Level No physical assistance   Comment S with RW    Toilet Transfer CARE Score 4   Cognition   Overall Cognitive Status Impaired   Arousal/Participation Alert; Cooperative   Attention Attends with cues to redirect   Orientation Level Oriented to person;Oriented to place;Oriented to situation;Disoriented to time   Memory Decreased short term memory   Following Commands Follows one step commands with increased time or repetition   Additional Activities   Additional Activities Other (Comment)   Additional Activities Comments Pt participated in item retrieval utilizing RW to perform fxnl mobility around [facility] and retrieve indicated items  Pt demo safe positioning of RW prior to performing fxnl reach to retrieve individual items, placing items on folding RW tray to transport back to designated area  Pt also completed item retrieval from flr level utilizing  LHR  Pt again dmeonstrated safe positioning of RW prior to performing retrieval with LHR  Overall, pt req S for activity with focus on improving fxnl standing balance and improving overall activity tolerance in preparation for D/C home  Activity Tolerance   Activity Tolerance Patient tolerated treatment well   Assessment   Treatment Assessment Pt participated in 90 min skilled OT Tx session with focus on ADL performance, ADL transfer's, and improving overall fxnl standing balance and activity tolerance  See above for further Tx details  Pt has achieved OT goals of S for ADL performance and transfer's  DME includes, shower chair with back, LHR, and handout provided for family to IND purchase folding RW tray  Pt scheduled to D/C home on 1/21 with S from family and Home OT/PT/RN services  Pt with no further questions for OT at this time  Prognosis Fair   Problem List Decreased strength;Decreased endurance; Impaired balance;Decreased cognition;Decreased safety awareness; Impaired judgement   Recommendation   OT Discharge Recommendation Home with home health rehabilitation   OT - OK to Discharge Yes   OT Therapy Minutes   OT Time In 0700   OT Time Out 0830   OT Total Time (minutes) 90   OT Mode of treatment - Individual (minutes) 90   OT Mode of treatment - Concurrent (minutes) 0   OT Mode of treatment - Group (minutes) 0   OT Mode of treatment - Co-treat (minutes) 0   OT Mode of Treatment - Total time(minutes) 90 minutes   OT Cumulative Minutes 750   Therapy Time missed   Time missed?  No

## 2022-01-20 NOTE — ASSESSMENT & PLAN NOTE
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

## 2022-01-20 NOTE — ASSESSMENT & PLAN NOTE
Continue metoprolol tartrate 25mg BID and sotalol 80mg BID for rate control  Continue Eliquis 5mg BID for anticoagulation  Monitor routine VS   Replete electrolytes as needed  Continue to follow-up with Dr Vivi Encarnacion as outpatient

## 2022-01-21 ENCOUNTER — TELEPHONE (OUTPATIENT)
Dept: NEUROLOGY | Facility: CLINIC | Age: 72
End: 2022-01-21

## 2022-01-21 ENCOUNTER — PATIENT OUTREACH (OUTPATIENT)
Dept: CASE MANAGEMENT | Facility: OTHER | Age: 72
End: 2022-01-21

## 2022-01-21 VITALS
WEIGHT: 165.12 LBS | RESPIRATION RATE: 18 BRPM | OXYGEN SATURATION: 97 % | HEART RATE: 55 BPM | DIASTOLIC BLOOD PRESSURE: 70 MMHG | BODY MASS INDEX: 28.19 KG/M2 | SYSTOLIC BLOOD PRESSURE: 160 MMHG | HEIGHT: 64 IN | TEMPERATURE: 97.6 F

## 2022-01-21 LAB
GLUCOSE SERPL-MCNC: 128 MG/DL (ref 65–140)
GLUCOSE SERPL-MCNC: 165 MG/DL (ref 65–140)

## 2022-01-21 PROCEDURE — 99232 SBSQ HOSP IP/OBS MODERATE 35: CPT | Performed by: INTERNAL MEDICINE

## 2022-01-21 PROCEDURE — 99239 HOSP IP/OBS DSCHRG MGMT >30: CPT | Performed by: PHYSICAL MEDICINE & REHABILITATION

## 2022-01-21 PROCEDURE — 82948 REAGENT STRIP/BLOOD GLUCOSE: CPT

## 2022-01-21 RX ORDER — LOSARTAN POTASSIUM 25 MG/1
25 TABLET ORAL DAILY
Qty: 30 TABLET | Refills: 0 | Status: SHIPPED | OUTPATIENT
Start: 2022-01-21 | End: 2022-01-27

## 2022-01-21 RX ORDER — ATORVASTATIN CALCIUM 80 MG/1
80 TABLET, FILM COATED ORAL
Qty: 30 TABLET | Refills: 0 | Status: SHIPPED | OUTPATIENT
Start: 2022-01-21 | End: 2022-01-31 | Stop reason: SDUPTHER

## 2022-01-21 RX ORDER — ASPIRIN 81 MG/1
81 TABLET ORAL DAILY
Qty: 30 TABLET | Refills: 0 | Status: SHIPPED | OUTPATIENT
Start: 2022-01-21

## 2022-01-21 RX ADMIN — FOLIC ACID 1 MG: 1 TABLET ORAL at 08:44

## 2022-01-21 RX ADMIN — BUSPIRONE HYDROCHLORIDE 7.5 MG: 15 TABLET ORAL at 08:44

## 2022-01-21 RX ADMIN — SOTALOL HYDROCHLORIDE 80 MG: 80 TABLET ORAL at 08:45

## 2022-01-21 RX ADMIN — APIXABAN 5 MG: 5 TABLET, FILM COATED ORAL at 08:45

## 2022-01-21 RX ADMIN — Medication 2000 UNITS: at 08:45

## 2022-01-21 RX ADMIN — INSULIN LISPRO 1 UNITS: 100 INJECTION, SOLUTION INTRAVENOUS; SUBCUTANEOUS at 12:34

## 2022-01-21 RX ADMIN — CYANOCOBALAMIN TAB 1000 MCG 500 MCG: 1000 TAB at 08:45

## 2022-01-21 RX ADMIN — LOSARTAN POTASSIUM 25 MG: 25 TABLET, FILM COATED ORAL at 08:45

## 2022-01-21 RX ADMIN — ASPIRIN 81 MG: 81 TABLET, COATED ORAL at 08:45

## 2022-01-21 RX ADMIN — SITAGLIPTIN 25 MG: 25 TABLET, FILM COATED ORAL at 08:45

## 2022-01-21 RX ADMIN — PANTOPRAZOLE SODIUM 40 MG: 40 TABLET, DELAYED RELEASE ORAL at 06:15

## 2022-01-21 RX ADMIN — METOPROLOL TARTRATE 25 MG: 25 TABLET, FILM COATED ORAL at 08:45

## 2022-01-21 RX ADMIN — LORATADINE 10 MG: 10 TABLET ORAL at 08:45

## 2022-01-21 NOTE — ASSESSMENT & PLAN NOTE
Lab Results   Component Value Date    EGFR 33 01/20/2022    EGFR 29 01/17/2022    EGFR 28 01/14/2022    CREATININE 1 53 (H) 01/20/2022    CREATININE 1 69 (H) 01/17/2022    CREATININE 1 75 (H) 01/14/2022     Creatinine currently 1 53  Baseline appears to be 1 5-1 8  Avoid nephrotoxins  Encourage adequate hydration  Appears euvolemic  Lasix on hold  Continue to trend routine BMP  Previously seen by Dr Racquel Chatterjee (Nephrology) as an outpatient

## 2022-01-21 NOTE — DISCHARGE INSTRUCTIONS
DISCHARGE INSTRUCTIONS: Pro Goodwin [age]    Bring these instructions with you to your Outpatient Physician appointments so they can order and follow-up any additional lab work or imaging recommended at time of discharge  It  is you or your caregivers responsibility to obtain follow-up MEDICATION REFILLS  As indicated through your Primary Care Physician (PCP) and other outpatient specialty provider(s) after discharge  Please follow-up with your PCP as soon as possible after discharge to set-up follow-up management and when appropriate refills  You have been determined to have some cognitive impairments  - It is YOUR CAREGIVER'S RESPONSIBILITY to ensure appropriate follow-up which includes:  - APPOINTMENTS are scheduled and safe transportation is arranged  - LABS and IMAGING are completed  - MEDICATION MANAGEMENT at home is carried out appropriately     You remain a fall and injury risk which could be severe  - Your risk of fall has decreased however since admission to acute rehab  Caregiver training has been completed with our staff  - Appropriate supervision +/- assistance as instructed during your rehab course is recommended to decrease risk of fall and injury  If you (or your health care proxy) have any questions or concerns regarding your acute rehabilitation stay including issues with medications, rehabilitation, and follow-up plan, please call:          [facility] Acute Rehabilitation Unit at [address] [facility] at [phone]    Should you develop fevers, chills, new weakness, changes in sensation, difficulty speaking, facial weakness, confusion, shortness of breath, chest pain, or other concerning symptoms please call 911 and/or obtain transportation to nearest ER immediately      PHYSICIANS to see:  Please see your doctors listed in the follow up providers section of your discharge paperwork, and take the discharge paperwork with you to your appointments  Home health has been ordered for you: Your home health agency should reach out to you or your family soon to arrange follow-up  (If [facility] is your provider their phone number is [phone])    If you are unable to get in touch with home health you may contact your  at [phone]  [place]    LAB WORK to recommended after discharge: Follow-up lab work at discretion of your outpatient physicians to be determined at time of your future appointments  IMAGING to follow-up:  Follow-up imaging as discussed with your recent physicians or at discretion of your outpatient physicians to be determined at time of your appointments  ADDITIONAL FINDINGS and ISSUES to follow-up:     1   Heterogeneous thyroid glands with ill-defined nodules measuring less than 1 cm   Incidental discovery of one or more thyroid nodule(s) measuring less than 1 5 cm and without suspicious features is noted in this   patient who is above 28years old; according to guidelines published in the February 2015 white paper on incidental thyroid nodules in the Journal of the [employer] of Radiology Jen Jose), no further evaluation is recommended       2  Lung nodules  - Scattered groundglass opacities in the periphery of the right lung   Previous rounded opacity in the medial right lower lobe has diminished to a small spiculated lesion measuring approximately 2 cm, this previously measured as much has 3 7 cm and was FDG avid   Trace right-sided pleural effusion      - There are scattered groundglass opacities periphery of the left lung is well which are new from the prior exam   Previous opacity in the left upper lobe is reidentified measuring up to 5 cm, previously as much as 7 8 cm but more consolidated on today's exam which may be due to postobstructive atelectasis   Previous opacities in the superior segment of the left lower lobe appear increased on today's exam, but are difficult to differentiate from one is presumed to be associated loculated pleural fluid   and atelectasis       3  Sangeeta Santiago is again a cystic lesion in the pancreatic tail measuring up to 2 9 cm AP   No abnormal enhancement   Stable 9 mm cystic structure anterior surface of the pancreatic neck/body, also stable                       Follow up required: Routine              Follow up should be done within 1 month(s)     Please notify the following clinician to assist with the follow up:  Farrah [facility], [address]       Check under the "DISCHARGE PROVIDER" section of these DISCHARGE INSTRUCTIONS for provider specific issues as well  Excessive delay or lack of appropriate follow-up could potentially increase your risk of complications which could be severe and even life-threatening  It is you or your caregiver's responsibility to ensure these tests are ordered by your outpatient providers and followed up with accordingly  WEIGHTBEARING/ACTIVITY PRECAUTIONS to follow:  Weightbearing as tolerated  Driving restrictions: You are recommended against driving until cleared by an outpatient physician and cleared through a formal driving evaluation  Driving at current time can increase your risk of injury and can increase risk of injury of others  **As outlined in [address], healthcare personnel are required to report every person over 13years of age diagnosed as having a condition that could impair their ability to drive, with the exception of medical condition expected to last less than 90 days (most commonly orthopedic fractures and post orthopedic surgery conditions without other co-morbidities)  Your medical condition hopefully will have adequately improved by that time but at this time it is not certain  Alcohol restrictions: You are recommended to not drink alcohol at this time unless cleared by an outpatient physician    Drinking alcohol in your current functional condition can increase your risk of injury which could be severe  Drinking alcohol given your current health problems can lead to increased medical complications which could be severe  Combining alcohol with your current medications can increase your risk of injury which could be severe  Smoking restrictions: You are recommended to not smoke nicotine  Smoking increases your risk of heart attack, stroke, emphysema/COPD, and lung cancer  MEDICATIONS:  Please see a full list of your medications outlined in the After Visit Summary that is attached to these Discharge Instructions  Please note changes may have been made to your medications please refer to your discharge paperwork for your current medications and take this list with you to all your doctors appointments for your doctors to review  Please do not resume a home medication unless the medication reconciliation sheet indicates to do so, please do not assume that a medication that you were given a prescription for is the same as a medication you have at home based on both medications having the same name as dosages and frequency may have changed  Unless specifically noted in your medication list provided to you in your discharge paper work do not resume prior vitamins, minerals, or supplements you may have been taking prior to your hospitalization unless instructed by an outpatient physician in the future  Blood thinners prescribed to optimize long and short term health and decrease risk of complications but with risks related to bleeding and other complications  Eliquis (Apixiban) instructions: You have been prescribed apixiban(Eliquis)  This medication is used to prevent clots which can cause severe disability and even death  This medication will need to be managed by your outpatient physician(s) after discharge  Follow-up with the appropriate provider as soon as possible to ensure appropriate use      This is a strong anticoagulant (blood thinner)  It is being recommended for use by you (or your family) to decrease the risk of clotting which can be severely disabling and even life-threatening  Even when provided as recommended it can cause severe disabling and even life-threatening bleeding  Too much or too little of this blood thinner further increases your risk of this medication causing serious and even life-threatening complications such as severe bleeding, clots, strokes, and death  With that said, at this time based on available evidence and consensus agreement, your physicians recommend you take Eliquis (Analilia Heater) medication based on your overall risks and benefits in your specific medical situation  If you (or your family/caregiver) notice black stools, bloody stools, vomit blood, develop new weakness, slurred speech, confusion or have any other concerning symptoms call 911 or obtain transportation to nearest emergency room immediately  Aspirin instructions  TAKE aspirin daily unless instructed otherwise by a doctor  This medication will need to be managed by your outpatient physician(s) after discharge  Follow-up with the appropriate provider as soon as possible to ensure appropriate use  This is a blood thinner  It is being recommended for use by you (or your family) to decrease the risk of clotting which can be severely disabling and even life-threatening  Even when provided as recommended it can cause severe disabling and even life-threatening bleeding  Too much or too little of this blood thinner further increases your risk of this medication causing serious and even life-threatening complications such as severe bleeding, clots, strokes, and death  With that said, at this time based on best available evidence and consensus agreement, your physicians recommend you take aspirin based on your overall risks and benefits in your specific medical situation        If you (or your family/caregiver) notice black stools, bloody stools, vomit blood, develop new weakness, slurred speech, confusion or have any other concerning symptoms call 911 or obtain transportation to nearest emergency room immediately  MEDICAL MANAGEMENT AT HOME specific to you:    Diabetes Management:  Please check your blood sugars 2 times daily before breakfast and dinner and record them to provide to your doctors, contact your family doctor as soon as possible for blood sugars higher than 220 or lower than 100  Follow-up with PCP/family doctor regularly to ensure blood sugars remain adequately controlled  Hypertension Management:  Only take the medications prescribed for you at time of discharge - overly high or low blood pressure increases your risk for health complications    Follow-up with PCP/family doctor regularly to ensure blood pressure remains adequately controlled  Please check your blood pressure prior to taking your blood pressure medications and keep a log that you will bring with you to your follow-up doctors' appointments  >Please contact your family doctor or cardiologist immediately for a blood pressure below 100/50 and do not take your blood pressure medications until speaking with them  >Please contact your family doctor or cardiologist as soon as possible for blood pressure greater than 160/100  Acetaminophen (Tylenol) Dosing Warning: You may have up to 3000 mg of acetaminophen (Tylenol) from all sources spread out over a 24 hours period  Do not have more than that, as this can increase your risk of liver injury which can be serious       NSAID Warning:  Please avoid NSAID (including but not limited to advil, aleve, motrin, naproxen, ibuprofen, mobic, meloxicam, diclofenac etc) medications as NSAID medications may increase your risk of bleeding (which can be life-threatening), stroke, heart attack, developing/worsening GI ulcers, worsening heart failure, kidney disease, liver (cirrhosis) disease, potentially delay bone healing  Please note a summary of your hospital stay with relevant information for your doctors will try to be sent to them  Please confirm with your doctors at your follow up visits that they have received this summary and have them contact [address] [place] [address] if they have not received them along with any other medical records they may require       Bebo Kingsley Phone Number:  [phone]

## 2022-01-21 NOTE — DISCHARGE SUMMARY
Discharge Summary - PMR   Cheryle Mount 66 y o  female MRN: [id number]  Unit/Bed#: -64 Encounter: [id number]    Admission Date: 1/11/2022     Discharge Date: 1/21/2022    Etiologic/Rehabilitation Diagnosis: Impairment of mobility, safety and Activities of Daily Living (ADLs) due to Stroke:  01 1  Left Body Involvement (Right Brain)    HPI: Cheryle Mount is a 66 y o  female with known stage 4 juan cancer undergoing chemotherapy every 3 weeks, A fib on Eliquis, HTN, HLD, CHF (grade 2 DD), GERD, CKD3, DM2, bilateral knee osteoarthritis, depression/anxiety who presented to the [facility] [address] on 1/3/22 due to 4 falls and leg contusion  Patient found to have a right buttock hematoma which remained stable  Patient developed altered mental status  CTH negative  MRI brain ultimately revealed a acute right basal ganglia ischemic CVA  MRA/MRV negative for occlusive disease  MRI brain with contrast negative for any enhancing lesions  Patient seen by Neurology and determined etiology of CVA small vessel disease  Aspirin was added to her secondary CVA ppx  Medically, treated for UTI with Ceftriaxone and Keflex and has completed treatment  Also required a Bonner placement on 1/8/22 for urinary retention,  Patient had diarrhea transiently which improved  Patient's BP medications were adjusted to allow for perimissive HTN post CVA  After medical stabilization, patient was found to have acute functional deficits from her baseline in mobility, self care, and cognition and was [facility] for acute inpatient rehabilitation  Procedures Performed During [facility] Admission: None    Acute Rehabilitation Center Course: Patient participated in a comprehensive interdisciplinary inpatient rehabilitation program which included involvment of MD, therapies (PT, OT, and/or SLP), RN, CM, SW, dietary, and psychology services   She was able to be advanced to an overall supervision level of assist and considered safe for discharge home with family  Please see below for patient's day to day management of rehabilitation needs  Please refer to Internal Medicine notes during [facility] stay for day to day management of patient's medical co-morbidities  * Acute ischemic stroke [facility])  Assessment & Plan  Presented with 4 falls and mental status change  MRI brain confirmed a right basal ganglia and right caudate ischemic CVA  Etiology: Small vessel disease most likely  Secondary CVA ppx: managed on Eliquis 5mg BID, Lipitor 80mg daily, and now aspirin 81mg daily  Continue CVA education while at [address] [place] [address] education and reduction of modifiable risk factors  Monitor mood  Follow-up with Neurology as outpatient     Lethargy  Assessment & Plan  Unclear etiology, has since resolved  MOCA early in stay was 8/30, but will recheck  Neuropathy due to chemotherapeutic drug [facility])  Assessment & Plan  Not painful  Consider topical lidoderm patch or gabapentin if pain develops    GERD (gastroesophageal reflux disease)  Assessment & Plan  Managed on Protonix (therapeutic exchange for Prilosec)    Diastolic congestive heart failure (HCC)  Assessment & Plan  Grade I DD on echocardiogram  Lasix on HOLD due to ADITYA  Patient euvolemic, continue to monitor  Continue beta blockers, ARB      IM consultants following and managing   Follows with Dr Jose Mckeon glass opacity present on imaging of lung  Assessment & Plan  Found incidentally on CT Chest  COVID - 19 negative   PCP to follow as outpatient     Atrial fibrillation [facility])  Assessment & Plan  Rate/rhythm controlled on Lopressor 25mg Q12hrs and Sotolol 80mg BID  Anticoagulated on Eliquis 5mg BID  Stable  IM consultants following while at [place] [address] with Dr Jonel Watters on home regimen of Buspar 7 5mg BID and Effexor XR 37 5 QHS  Mood is stable  Consult neuropsychology if needed    Diabetes type 2, controlled [facility])  Assessment & Plan  Lab Results   Component Value Date    HGBA1C 5 8 (H) 01/05/2022     At home was on Metformin and Tradjenta  Here: [employer] not formulary  Rosalesuvia with plan to transition to home Tradjenta at discharge  Continue CCI, diabetic diet, accuchecks  Nutrition consulted   IM consultants following and managing     Hypertension  Assessment & Plan  Managed here on Lopressor 25mg Q12h, Losartan 25mg daily  Lasix on HOLD  Amlodipine discontinued due to soft BP  BP stable   Follows with Dr Alysia Anderson    Chronic kidney disease, stage 3 [facility])  Assessment & Plan  ADITYA on CKD detail I  Baseline creatinine 1 5-1 8  Cr = 1 69  Lasix on HOLD as patient is euvolemic  Avoid nephrotoxic meds, monitor volume status  Losartan reduced from 50mg daily to 25mg daily by IM consultants    Lung cancer [facility])  Assessment & Plan  Known stage 4 adenocarcinoma of lung   · Follows with Dr Pat Sandoval  · Deepti Esparza Chemotherapy every 3 weeks  Last infusion was 12/27/21  Missed her 1/7/22 infusion due to hospitalization  · Follow-up with Dr Pat Sandoval as outpatient   CT Chest: Scattered groundglass opacities in both lungs which are new from prior exam   Consider infectious or inflammatory etiologies including Covid viral pneumonia  Previous right lower lobe lung mass has diminished since July  Previous left upper lobe tumor has also decreased in size but appears more atelectatic  Increasing water attenuation pleural fluid, partially loculated pleural fluid in the left lower lung  Follow-up per cancer imaging protocol  Contusion of left lower leg  Assessment & Plan  Sustained during fall  X-ray of right ankle is negative for fracture, however showing linear calcifications associated with the plantar fascia and Achilles tendon, these are chronic  Buttocks Hematoma  Assessment & Plan  Sustained during fall  CT showing Suspected small post traumatic hematoma measuring 2 cm in the soft tissues of the right buttock/perianal region    No active bleeding  Stable H&H  No overt pain complaints     Chemotherapy-induced thrombocytopenia-resolved as of 1/17/2022  Assessment & Plan  Resolved  1/17 Plt 233  UTI (urinary tract infection)-resolved as of 1/17/2022  Assessment & Plan  Resolved  Diagnosed in acute care  Urine culture + E  Coli   Treated with 5 days antibiotics Ceftriaxone and Keflex  Also was on oral vancomycin to prevent recurrent C  Diff  Asymptomatic     Urinary retention-resolved as of 1/17/2022  Assessment & Plan  Likely related to UTI  Bonner catheter inserted 1/8/22   Bonner removed 1/14/22 ; straight cath x 1   Patient is now continent all day 1/15/22   Retention has resolved  Discharge Physical Examination:  /70 (BP Location: Left arm)   Pulse 55   Temp 97 6 °F (36 4 °C) (Tympanic)   Resp 18   Ht 5' 4" (1 626 m)   Wt 74 9 kg (165 lb 2 oz)   SpO2 97%   BMI 28 34 kg/m²       Significant Findings, Care, Treatment and Services Provided: Acute comprehensive interdisciplinary inpatient rehabilitation including PT, OT, SLP, RN, CM, SW, dietary, psychology, etc     Complications: None    Functional Status Upon Admission to ARC:  · Physical Therapy · Occupational Therapy · Speech Therapy   · Weight Bearing Status: Full Weight Bearing  · Transfers: Minimal Assistance  · Bed Mobility: Minimal Assistance  · Amulation Distance (ft): 90 feet  · Ambulation: Minimal Assistance  · Assistive Device for Ambulation: Roller Walker  · Number of Stairs: 4  · Assistive Device for Stairs: Bilateral Hand Rails  · Stair Assistance: Minimal Assistance  · Ramp:  (TBA)  · Discharge Recommendations: Home with:  · DC Home with[de-identified] Home Physical Therapy  ·   · Eating:  (setup)  · Grooming: Supervision  · Bathing: Minimal Assistance  · Bathing: Minimal Assistance  · Upper Body Dressing: Supervision  · Lower Body Dressing: Moderate Assistance  · Toileting: Minimal Assistance  · Tub/Shower Transfer:  Moderate Assistance  · Toilet Transfer: Minimal Assistance  · Cognition: Exceptions to WNL  · Cognition: Decreased Memory,Decreased Executive Functions,Decreased Safety  · Orientation: Person,Place  ·   · Mode of Communication: Verbal  · Cognition: Exceptions to WNL  · Cognition: Decreased Memory,Decreased Executive Functions,Decreased Comprehension,Decreased Safety,Decreased Attention  · Orientation: Person,Place,Situation  · Discharge Recommendations: Home with:  · [address] with[de-identified] Family Support,24 Hour Supervision,24 Hour Assisteance,Home Speech Therapy,Outpatient Speech Therapy (pending pt progress)         Functional Status Upon Discharge from ARC:   PT: Sup transfers, Sup ambulation 250' with RW, Sup bilateral hand rails  OT:  Set-up eating, Sup grooming, Sup bathing, Sup UB/LB dressing, Sup toileting, Sup tub/shower transfers, Sup toilet transfers   SLP:  Decreased executive function skills (problem solving, reasoning, sequencing, judgment, insight), decreased ST/working memory, fatigue, and slower processing         Discharge Diagnosis: Impairment of mobility, safety and Activities of Daily Living (ADLs) due to Stroke:  01 1  Left Body Involvement (Right Brain)    Discharge Medications:   See after visit summary for reconciled discharge medications provided to patient and family  Condition at Discharge: good     Discharge instructions/Information to patient and family:   See after visit summary for information provided to patient and family  Provisions for Follow-Up Care:  See after visit summary for information related to follow-up care and any pertinent home health orders  No future appointments  Disposition: Home with Home PT/OT/SLP/RN    Planned Readmission: No    Discharge Statement   I spent 45 minutes discharging the patient  This time was spent on the day of discharge  I had direct contact with the patient on the day of discharge   Greater than 50% of the total time was spent examining patient, answering all patient questions, arranging and discussing plan of care with patient as well as directly providing post-discharge instructions  Additional time then spent on discharge activities  Discharge Medications:  See after visit summary for reconciled discharge medications provided to patient and family        Facility Administered Medications Prior to Discharge:    Current Facility-Administered Medications   Medication Dose Route Frequency Provider Last Rate    acetaminophen  650 mg Oral Q6H PRN Светлана Marie MD      apixaban  5 mg Oral BID Светлана Marie MD      aspirin  81 mg Oral Daily Светлана Marie MD      atorvastatin  80 mg Oral Daily With Zoe Mccarty MD      bisacodyl  10 mg Rectal Daily PRN Светлана Marie MD      busPIRone  7 5 mg Oral BID Светлана Marie MD      cholecalciferol  2,000 Units Oral Daily Светлана Marie MD      cyanocobalamin  500 mcg Oral Daily Светлана Marie MD      Diclofenac Sodium  2 g Topical TID PRN Aliyah Baeza MD      docusate sodium  100 mg Oral BID PRN Светлана Marie MD      folic acid  1 mg Oral Daily Светлана Marie MD      insulin lispro  1-6 Units Subcutaneous TID AC EDUARDO Brown      loratadine  10 mg Oral Daily Светлана Marie MD      losartan  25 mg Oral Daily EDUARDO Brown      metoprolol tartrate  25 mg Oral Q12H [facility] Светлана Marie MD      ondansetron  4 mg Oral Q6H PRN Светлана Marie MD      pantoprazole  40 mg Oral Early Morning Светлана Marie MD      polyethylene glycol  17 g Oral Daily PRN Светлана Marie MD      sitaGLIPtin  25 mg Oral Daily Aliyah Baeza MD      sotalol  80 mg Oral BID Светлана Marie MD      venlafaxine  37 5 mg Oral HS Светлана Marie MD

## 2022-01-21 NOTE — NURSING NOTE
Patient's belongings packed cell phone and  and clothes   Appointments and meds gone over with daughter

## 2022-01-21 NOTE — PROGRESS NOTES
This CM Assistant received an ADT alert indicating the patient discharged 1/21/22 to Home with [facility]  I have removed myself off of the care team, added the CM to the care team who will follow the patient through the bundle episode, sent the care manager a inbasket notifying them of the bundle episode, updated the BPCI form, and updated the care coordination note

## 2022-01-21 NOTE — CASE MANAGEMENT
Team d/c summary:    Pt made good rehab progress and returned home with her dtr  She will receive continued home PT, OT, RN, and SLP services through [facility]  Pt's dtr was present for d/c instructions and is aware of functional ability

## 2022-01-21 NOTE — PLAN OF CARE
Problem: SAFETY ADULT  Goal: Patient will remain free of falls  Description: INTERVENTIONS:  - Educate patient/family on patient safety including physical limitations  - Instruct patient to call for assistance with activity   - Consult OT/PT to assist with strengthening/mobility   - Keep Call bell within reach  - Keep bed low and locked with side rails adjusted as appropriate  - Keep care items and personal belongings within reach  - Initiate and maintain comfort rounds  - Make Fall Risk Sign visible to staff  - Offer Toileting every 4Hours, in advance of need  - Initiate/Maintain alarm  - Obtain necessary fall risk management equipment: rw  - Apply yellow socks and bracelet for high fall risk patients  - Consider moving patient to room near nurses station  Outcome: Progressing  Goal: Maintain or return to baseline ADL function  Description: INTERVENTIONS:  -  Assess patient's ability to carry out ADLs; assess patient's baseline for ADL function and identify physical deficits which impact ability to perform ADLs (bathing, care of mouth/teeth, toileting, grooming, dressing, etc )  - Assess/evaluate cause of self-care deficits   - Assess range of motion  - Assess patient's mobility; develop plan if impaired  - Assess patient's need for assistive devices and provide as appropriate  - Encourage maximum independence but intervene and supervise when necessary  - Involve family in performance of ADLs  - Assess for home care needs following discharge   - Consider OT consult to assist with ADL evaluation and planning for discharge  - Provide patient education as appropriate  Outcome: Progressing  Goal: Maintains/Returns to pre admission functional level  Description: INTERVENTIONS:  - Perform BMAT or MOVE assessment daily    - Set and communicate daily mobility goal to care team and patient/family/caregiver     - Collaborate with rehabilitation services on mobility goals if consulted  - Perform Range of Motion 4 times a day   - Reposition patient every 4hours    -   - Stand patient 4times a day  - Ambulate patient 4 times a day  - Out of bed to chair 4times a day   - Out of bed for meals 3times a day  - Out of bed for toileting  - Record patient progress and toleration of activity level   Outcome: Progressing

## 2022-01-21 NOTE — ASSESSMENT & PLAN NOTE
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

## 2022-01-21 NOTE — PLAN OF CARE
Problem: PAIN - ADULT  Goal: Verbalizes/displays adequate comfort level or baseline comfort level  Description: Interventions:  - Encourage patient to monitor pain and request assistance  - Assess pain using appropriate pain scale  - Administer analgesics based on type and severity of pain and evaluate response  - Implement non-pharmacological measures as appropriate and evaluate response  - Consider cultural and social influences on pain and pain management  - Notify physician/advanced practitioner if interventions unsuccessful or patient reports new pain  1/21/2022 1435 by Mitzy Tirado RN  Outcome: Adequate for Discharge  1/21/2022 1137 by Mitzy Tirado RN  Outcome: Progressing

## 2022-01-21 NOTE — TELEPHONE ENCOUNTER
MARGOT/ACUTE ISCHEMIC STROKE          NOTES:  Ha Gonzalez will need follow up in in 6 weeks with neurovascular attending or advance practitioner  She will not require outpatient neurological testing  SCHEDULED:  2/14/22 @ 9:45AM W/[facility] IN [place]  SENT APPOINTMENT DETAILS/DIRECTIONS

## 2022-01-21 NOTE — ASSESSMENT & PLAN NOTE
Lab Results   Component Value Date    HGBA1C 5 8 (H) 01/05/2022       Recent Labs     01/20/22  1115 01/20/22  1613 01/20/22 2058 01/21/22  0615   POCGLU 206* 91 181* 128       Blood Sugar Average: Last 72 hrs:  (P) 148 [id number]     A1c 5 8 on 1/5  Home regimen: metformin 1000mg BID and Tradjenta 5mg daily  Currently on SSI - increased to algorithm 3 on 1/13  Creatinine clearance 35  Hold on metformin  Started on Januvia 25mg daily on 1/18 - transition to [facility] [place] at discharge  Continue QID accuchecks and cons  carb diet  Continue to follow-up with PCP as outpatient

## 2022-01-21 NOTE — PROGRESS NOTES
[address] [facility]  Progress Note Robles Proud 1950, 66 y o  female MRN: [id number]  Unit/Bed#: -04 Encounter: [id number]  Primary Care Provider: Farrah Vieira DO   Date and time admitted to hospital: 1/11/2022  3:39 PM    Lethargy  Assessment & Plan  Difficulty maintaining conversation on 1/12 - falling asleep multiple times during exam   Admits to snoring - overnight noc ox ordered for 1/12 - lowest saturation 88% for 1 minute  Does not qualify for oxygen  CT head on 1/12: "No new intracranial abnormality  Continued evolution of late subacute to chronic right basal ganglia infarct  No hemorrhage or significant mass effect "  Frequent neuro-checks  Per family, pt has been experiencing more daytime sleepiness since starting chemo  Dyslipidemia  Assessment & Plan  Started on Lipitor 80mg daily in acute setting - continue  Lipid panel on 1/5: Cholesterol 201, Triglycerides 252, HDL 41,   GERD (gastroesophageal reflux disease)  Assessment & Plan  Stable  Continue Protonix 40mg daily as substitute for non-formulary Prilosec 20mg daily  Atrial fibrillation (HCC)  Assessment & Plan  Continue metoprolol tartrate 25mg BID and sotalol 80mg BID for rate control  Continue Eliquis 5mg BID for anticoagulation  Monitor routine VS   Replete electrolytes as needed  Continue to follow-up with Dr Jasmyne Enriquez as outpatient  Depression  Assessment & Plan  Stable  Continue Buspar 7 5mg Q12 and Effexor 37 5mg daily  Supportive counseling as needed  Follows with Palliative as outpatient  Diabetes type 2, controlled [facility])  Assessment & Plan  Lab Results   Component Value Date    HGBA1C 5 8 (H) 01/05/2022       Recent Labs     01/20/22  1115 01/20/22  1613 01/20/22  2058 01/21/22  0615   POCGLU 206* 91 181* 128       Blood Sugar Average: Last 72 hrs:  (P) 148 [id number]     A1c 5 8 on 1/5  Home regimen: metformin 1000mg BID and Tradjenta 5mg daily    Currently on SSI - increased to algorithm 3 on 1/13  Creatinine clearance 35  Hold on metformin  Started on Januvia 25mg daily on 1/18 - transition to [address] at discharge  Continue QID accuchecks and cons  carb diet  Continue to follow-up with PCP as outpatient  Hypertension  Assessment & Plan  Continue home regimen of Losartan 50mg daily and metoprolol tartrate 25mg BID  Started on amlodipine 5mg BID in acute setting - continue at this time  Started on Lasix 40mg daily in acute setting - only taking as needed at home  Currently on hold  Losartan restarted on 1/11 - decreased dose to 25mg daily on 1/12 due to lethargy/possible hypoperfusion  Decreased amlodipine to 5mg at HS on 1/14  Discontinued on 1/18  Monitor BP with VS   Holding parameters for SBP <130  Continue to follow-up with Dr Dalton Cabrera as outpatient  Chronic kidney disease, stage 3 [facility])  Assessment & Plan  Lab Results   Component Value Date    EGFR 33 01/20/2022    EGFR 29 01/17/2022    EGFR 28 01/14/2022    CREATININE 1 53 (H) 01/20/2022    CREATININE 1 69 (H) 01/17/2022    CREATININE 1 75 (H) 01/14/2022     Creatinine currently 1 53  Baseline appears to be 1 5-1 8  Avoid nephrotoxins  Encourage adequate hydration  Appears euvolemic  Lasix on hold  Continue to trend routine BMP  Previously seen by Dr Malathi Mckay (Nephrology) as an outpatient  Lung cancer [facility])  Assessment & Plan  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  Currently receiving chemotherapy: Ramucirumab + Docetaxel Q21 days  Will need to restart after discharge from [facility]  Follows with Dr Adriana Gamboa (Heme/Onc) as outpatient  * Acute ischemic stroke [facility])  Assessment & Plan  1/3 - admitted with AMS and multiple falls  CT head on 1/3: No acute intracranial abnormalities  MRI brain on 1/4: R basal ganglia and R caudate acute ischemia  MRI brain with contrast on 1/5: No enhancing lesions  Repeat CT head on 1/6: Evolving R basal ganglia infarct  No hemorrhage or significant mass effect  ECHO on  - EF 60% with grade 1 relaxation  EEG on  - Non-specific  Continue Lipitor 80mg daily and ASA 81mg daily  Likely related to small vessel disease  Maintain normotension  Neurovascular checks Q shift  Follow-up with Neurology as outpatient  PT/OT/Speech therapies  Primary team following  VTE Pharmacologic Prophylaxis:   Pharmacologic: Apixaban (Eliquis)  Mechanical VTE Prophylaxis in Place: Yes - sequential compression devices  Current Length of Stay: 10 day(s)    Current Patient Status: Inpatient Rehab     Discharge Plan: As per primary team     Code Status: Level 3 - DNAR and DNI    Subjective:   Pt examined while pt sitting in recliner in pt room  Currently has no complaints  Plans for discharge later today  Has no questions or concerns regarding her medications or discharge  Heme/Onc appt scheduled for Monday  Objective:     Vitals:   Temp (24hrs), Av 2 °F (36 2 °C), Min:96 5 °F (35 8 °C), Max:97 6 °F (36 4 °C)    Temp:  [96 5 °F (35 8 °C)-97 6 °F (36 4 °C)] 97 6 °F (36 4 °C)  HR:  [55-60] 55  Resp:  [18] 18  BP: (131-160)/(60-70) 160/70  SpO2:  [97 %-99 %] 97 %  Body mass index is 28 34 kg/m²  Review of Systems   Constitutional: Negative for appetite change, chills, fatigue and fever  Respiratory: Negative for cough and shortness of breath  Cardiovascular: Negative for chest pain, palpitations and leg swelling  Gastrointestinal: Negative for abdominal distention, abdominal pain, constipation, diarrhea, nausea and vomiting  LBM    Genitourinary: Negative for difficulty urinating  Musculoskeletal: Negative for arthralgias and back pain  Neurological: Negative for dizziness, weakness, light-headedness, numbness and headaches  Psychiatric/Behavioral: Negative for sleep disturbance  Input and Output Summary (last 24 hours):        Intake/Output Summary (Last 24 hours) at 2022 1106  Last data filed at 1/21/2022 0924  Gross per 24 hour   Intake 540 ml   Output 800 ml   Net -260 ml       Physical Exam:     Physical Exam  Vitals and nursing note reviewed  Constitutional:       Appearance: Normal appearance  HENT:      Head: Normocephalic and atraumatic  Cardiovascular:      Rate and Rhythm: Normal rate and regular rhythm  Pulses: Normal pulses  Heart sounds: Normal heart sounds  No murmur heard  No friction rub  Pulmonary:      Effort: Pulmonary effort is normal  No respiratory distress  Breath sounds: Normal breath sounds  No wheezing or rhonchi  Abdominal:      General: Abdomen is flat  Bowel sounds are normal  There is no distension  Palpations: Abdomen is soft  Tenderness: There is no abdominal tenderness  Musculoskeletal:      Cervical back: Normal range of motion and neck supple  Right lower leg: Edema (Minimal non-pitting edema) present  Left lower leg: Edema (Minimal non-pitting edema) present  Skin:     General: Skin is warm and dry  Neurological:      Mental Status: She is alert and oriented to person, place, and time     Psychiatric:         Mood and Affect: Mood normal          Behavior: Behavior normal          Additional Data:     Labs:    Results from last 7 days   Lab Units 01/20/22  0549   WBC Thousand/uL 5 00   HEMOGLOBIN g/dL 9 7*   HEMATOCRIT % 30 9*   PLATELETS Thousands/uL 212   NEUTROS PCT % 66*   LYMPHS PCT % 16*   MONOS PCT % 8   EOS PCT % 8*     Results from last 7 days   Lab Units 01/20/22  0549   SODIUM mmol/L 138   POTASSIUM mmol/L 4 3   CHLORIDE mmol/L 108   CO2 mmol/L 29   BUN mg/dL 29*   CREATININE mg/dL 1 53*   ANION GAP mmol/L 1*   CALCIUM mg/dL 8 6   GLUCOSE RANDOM mg/dL 119*         Results from last 7 days   Lab Units 01/21/22  0615 01/20/22 2058 01/20/22  1613 01/20/22  1115 01/20/22  0610 01/19/22  2019 01/19/22  1619 01/19/22  1117 01/19/22  0559 01/18/22  2045 01/18/22  1613 01/18/22  1118   POC GLUCOSE mg/dl 128 181* 91 206* 120 137 86 238* 131 163* 137 199*               Labs reviewed    Imaging:    Imaging reviewed    Recent Cultures (last 7 days):           Last 24 Hours Medication List:   Current Facility-Administered Medications   Medication Dose Route Frequency Provider Last Rate    acetaminophen  650 mg Oral Q6H PRN Tim Wynn MD      apixaban  5 mg Oral BID Tim Wynn MD      aspirin  81 mg Oral Daily Tim Wynn MD      atorvastatin  80 mg Oral Daily With Addie More MD      bisacodyl  10 mg Rectal Daily PRN Tim Wynn MD      busPIRone  7 5 mg Oral BID Tim Wynn MD      cholecalciferol  2,000 Units Oral Daily Tim Wynn MD      cyanocobalamin  500 mcg Oral Daily Tim Wynn MD      Diclofenac Sodium  2 g Topical TID PRN Berry Hammer MD      docusate sodium  100 mg Oral BID PRN Tim Wynn MD      folic acid  1 mg Oral Daily Tim Wynn MD      insulin lispro  1-6 Units Subcutaneous TID AC EDUARDO Enamorado      loratadine  10 mg Oral Daily Tim Wynn MD      losartan  25 mg Oral Daily EDUARDO Enamorado      metoprolol tartrate  25 mg Oral Q12H [address] Tim Wynn MD      ondansetron  4 mg Oral Q6H PRN Tim Wynn MD      pantoprazole  40 mg Oral Early Morning Tim Wynn MD      polyethylene glycol  17 g Oral Daily PRN Tim Wynn MD      sitaGLIPtin  25 mg Oral Daily Berry Hammer MD      sotalol  80 mg Oral BID Tim Wynn MD      venlafaxine  37 5 mg Oral HS Tim Wynn MD          M*Modal software was used to dictate this note  It may contain errors with dictating incorrect words or incorrect spelling  Please contact the provider directly with any questions

## 2022-01-24 ENCOUNTER — OFFICE VISIT (OUTPATIENT)
Dept: HEMATOLOGY ONCOLOGY | Facility: CLINIC | Age: 72
End: 2022-01-24
Payer: MEDICARE

## 2022-01-24 ENCOUNTER — TRANSITIONAL CARE MANAGEMENT (OUTPATIENT)
Dept: FAMILY MEDICINE CLINIC | Facility: CLINIC | Age: 72
End: 2022-01-24

## 2022-01-24 VITALS
RESPIRATION RATE: 18 BRPM | DIASTOLIC BLOOD PRESSURE: 70 MMHG | BODY MASS INDEX: 29.96 KG/M2 | TEMPERATURE: 97.5 F | SYSTOLIC BLOOD PRESSURE: 140 MMHG | OXYGEN SATURATION: 99 % | HEIGHT: 64 IN | HEART RATE: 61 BPM | WEIGHT: 175.5 LBS

## 2022-01-24 DIAGNOSIS — R11.2 CINV (CHEMOTHERAPY-INDUCED NAUSEA AND VOMITING): ICD-10-CM

## 2022-01-24 DIAGNOSIS — C34.12 MALIGNANT NEOPLASM OF UPPER LOBE OF LEFT LUNG (HCC): ICD-10-CM

## 2022-01-24 DIAGNOSIS — T45.1X5A NEUROPATHY DUE TO CHEMOTHERAPEUTIC DRUG (HCC): Primary | ICD-10-CM

## 2022-01-24 DIAGNOSIS — C41.9 MALIGNANT NEOPLASM OF BONE WITH METASTASES (HCC): ICD-10-CM

## 2022-01-24 DIAGNOSIS — E83.52 HYPERCALCEMIA: ICD-10-CM

## 2022-01-24 DIAGNOSIS — D63.1 ANEMIA IN STAGE 3B CHRONIC KIDNEY DISEASE (HCC): ICD-10-CM

## 2022-01-24 DIAGNOSIS — C34.92 ADENOCARCINOMA OF LEFT LUNG, STAGE 4 (HCC): ICD-10-CM

## 2022-01-24 DIAGNOSIS — G62.0 NEUROPATHY DUE TO CHEMOTHERAPEUTIC DRUG (HCC): Primary | ICD-10-CM

## 2022-01-24 DIAGNOSIS — T45.1X5A CINV (CHEMOTHERAPY-INDUCED NAUSEA AND VOMITING): ICD-10-CM

## 2022-01-24 DIAGNOSIS — T45.1X5A CHEMOTHERAPY INDUCED NEUTROPENIA (HCC): ICD-10-CM

## 2022-01-24 DIAGNOSIS — D70.1 CHEMOTHERAPY INDUCED NEUTROPENIA (HCC): ICD-10-CM

## 2022-01-24 DIAGNOSIS — N18.32 ANEMIA IN STAGE 3B CHRONIC KIDNEY DISEASE (HCC): ICD-10-CM

## 2022-01-24 PROCEDURE — 99215 OFFICE O/P EST HI 40 MIN: CPT | Performed by: PHYSICIAN ASSISTANT

## 2022-01-24 NOTE — PROGRESS NOTES
Hematology/Oncology Outpatient Follow- up Note  Cong Griffith 66 y o  female MRN: @ Encounter: [id number]        Date:  1/24/2022      Assessment / Plan:    Stage 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 of the lung with left scapula involvement diagnosed on 08/2016 PDL expression more than 50%, negative for EGFR mutation, ALK  rearrangement, Ros1 mutation     Treated initially with Pembrolizumab complicated with pneumonitis and later on nivolumab with prednisone 10 mg p o  Daily with excellent response      2   Disease progression in August 2018 in the left scapula with pain no new lesions by PET scan   Status post radiation therapy to the left scapula and treated with Alimta 500 milligram/meter squared, carboplatin AUC 5     After 3 cycles,  CT scan in January 2019 showed stable disease, and she was placed on maintenance Alimta 500 milligram/meter squared every 3 weeks         Alimta dose was reduced to 400 milligram/meter IV due to elevated Creatinine and  then Pemetrexed dose was reduced to 300 milligram/meter squared every 4 weeks    Cycle #38  Alimta was 6/22/21          3   Progression of disease 7/2021        Nivolumab 240 mg flat dose every 3 weeks and  carboplatin AUC 5 added to  pemetrexed which was dose reduced to 250 milligram/meter squared, initiated 8/2/2021          6   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%   (MAP2K1 mutations are mutually exclusive with BRAF mutations)  There are FDA approved therapies indicated for other disease states such as binimetinib, cobimetinib, selumetinib, trametinib   Given the very small (0 1%)  DNA amplification, use of these medications off label are likely to offer minimal benefit           7   CKD   She has established care with nephrology  Mino Martel is on tighter BP management      8   Atrial fibrillation   On eliquis   Continue            9   Progression of Disease on CT 9/23/21 as evidenced by further increase in extensive left upper/lower lobe airspace consolidation with somewhat masslike configuration, initiated on Taxotere Cyramza on 10/08/2021, she received 3 cycles with Neulasta growth factor support to prevent chemotherapy-induced neutropenia      CT chest 11/29/21 after 3 cycles showed overall stable disease  Development of left moderate pleural effusion  Patient asymptomatic, clinically very good breath sounds        CT 1/20221 showed favorable response  Right basal ganglia and right caudate ischemic CVA 1/2021 S/P hospitalization rehabilitation     ASA 81 mg po added to  Eliquis 5mg BID  Will discontinue Cyramza once treatment resumes  Anticipate 1 month re-initiation of treatment  Call if any issues or concerns           10  Episode of vaginal?/urinary? bleeding which preceded January 2022 admission  Patient is s/p hysterectomy  No discomfort  No recurrence  We discussed gynecology evaluation  She defers at this time  HPI:    Ha Gonzalez was admitted to the hospital with arrhythmia and was found to have right lower lobe infiltrate in August 2016   She was treated with antibiotics however repeat chest x-ray showed persistent right lower lobe infiltrate  Subsequently the patient had a CT scan of the chest which showed a right perihilar mass, subcarinal lymphadenopathy, lytic lesion of the right costovertebral junction at T10 level   PET scan October 2016 showed a right perihilar mass measuring 3 5 cm with SUV of 8 9, subcarinal lymph nodes measuring 3 4 x 2 2 cm with SUV of 9 2  Nodule in the left upper lobe lung measured 2 x 1 1 cm   There was a lytic lesion involving the right 10th costovertebral junction with SUV of 14  4      Biopsy showed non-small cell carcinoma with features suggesting of adenocarcinoma positive for CK 7, CK 19, CA-19-9, ANEUDY-3, partially positive for P40, p63, negative for TTF-1   She had a history of uterine cancer in 2000 status post hysterectomy and did not require radiation or chemotherapy   She is status post bilateral oophorectomy, right knee replacement,   tonsillectomy       She used to smoke for 35 years 1 pack per day however quit smoking 21 years ago   She used hormonal replacement therapy for 3 years  Lizzeth Finn has a family history significant for skin cancer in her father and coronary artery disease in mother  Lizzeth Finn has 2 healthy children      Treated initially with Pembrolizumab December 2016, Finished in May 2017 secondary to grade 3 pneumonitis  Initiated on prednisone     Progression: Nivolumab 240 mg flat dose every 2 weeks along with prednisone 10 mg p o  Daily initiated April 2018- October 2018 with excellent response      Liquid biopsy showed K-MADHURI mutation G12C, no evidence of MSI high        Disease progression in August 2018 in the left scapula with pain no new lesions by PET scan   Status post radiation therapy to the left scapula and treated with Alimta 500 milligram/meter squared, carboplatin AUC 5   After 3 cycles,  CT scan in January 2019 showed stable disease  Carbo discontinued 3/2019    Maintenance Alimta 500 milligram/meter squared every 3 weeks initiated 3/2019        Cr 2/20 was 1 5   Plan was to dose reduce Alimta to 400mg/m2; however cr 2 16 2/24/20 and Alimta was held       3/4/20:  renal u/s  Minimal fullness of the left renal collecting system without matthieu hydronephrosis     Chronic right kidney lower pole cortical scar, with adjacent parenchymal calcification measuring 5 mm       She was on prednisone 5 mg p o  daily because of previous history of pneumonitis  CT scan chest 1/3/2020 showed no evidence of infiltration in the lung parenchyma, prednisone was reduced every other day for 1 month and then discontinued        CT scan 4/2020 showed stable disease in the left upper lobe of the lung     CT scan on 07/2020 showed stable disease in the left upper lobe of the lung, pancreatic cyst 2 3 cm, stable from the previous CT scan however bigger from last year CT scan       Chronic LLE edema since fall she had 2/2020    Venous doppler negative for clot      CT scan in February 2021 showed stable left upper lobe lung nodule however with progressive chronic kidney disease we decided to hold pemetrexed, status post SBRT to the left upper lobe lung nodule     CT scan of the chest in June 2021 showed increase in the size of the right lower lobe lung mass may be progression of disease versus inflammation/ infection     PET scan on 07/14/2021 showed large consolidation in the left lobe might be metastatic disease versus inflammation, uptake in enlarging right lower lobe lung lesion suspicious for malignancy, no evidence of disease in the neck abdomen or pelvis, mild disease in the left scapula      Nivolumab 240 mg flat dose every 3 weeks, pemetrexed 250 milligram/meter squared, carboplatin AUC 5 every 3 weeks Initiated 8/2/2021 8/23/21 Evaluated patient in infusion center  Erythematous rash to forehead, circumferentially around neck R > L, and on right hand near IV site  Rash blanches and is not raised, well circumscribed borders  Presumed secondary to Opdivo vs alimta  She did receive her 2nd cycle of treatment        Patient with worsening renal function and hypercalcemia    Alimta held and Xgeva given 9/24/21  Vitamin-D level as well as iPTH and parathyroid hormone related protein requested but not drawn     Repeat BMP requested - not drawn        Progression of Disease on CT 9/23/21    Further increase in extensive left upper/lower lobe airspace consolidation with somewhat masslike configuration   Nodular region right diaphragmatic leaflet  measuring up to 2 9 x 1 6 cm   3 0 x 2 4 previously initiated on Ramucirumab 10 milligram/kilogram, Taxotere 75 milligram/meter squared every 3 weeks on 10/15/2021      C diff colitis 10/19/21 treated with metronidazole successful no evidence of neutropenia     noncontrast (per patient request)Brain MRI 11/1/21 prior to initiation of Braydon Hamming did not identify metastases  Interval History:  Here with daughter, Hector Doss  Admitted 1/3 - 1/21/22 regarding recurrent falls and altered mental status  Negative CT head however brain  MRI ultimately revealed a acute right basal ganglia ischemic CVA  MRA/MRV negative for occlusive disease   MRI brain with contrast negative for any enhancing lesions   Patient seen by Neurology and determined etiology of CVA small vessel disease  Aspirin was added to her secondary CVA ppx     She was treated for UTI  Transitioned to [facility] for rehabilitation 2nd to decrease from her baseline in mobility, cognition and self care  Still not back to baseline  Prior to her admission she had some bleeding from vaginal/ urinary area  She is s/p hysterectomy  No recurrence  Occassional epistaxis  Appetite fair  Chronic intermittent joint aches  Test Results:        Labs:   Lab Results   Component Value Date    HGB 9 7 (L) 01/20/2022    HCT 30 9 (L) 01/20/2022    MCV 89 01/20/2022     01/20/2022    WBC 5 00 01/20/2022    NRBC 0 01/07/2022     Lab Results   Component Value Date     11/23/2015    K 4 3 01/20/2022     01/20/2022    CO2 29 01/20/2022    ANIONGAP 9 11/23/2015    BUN 29 (H) 01/20/2022    CREATININE 1 53 (H) 01/20/2022    GLUCOSE 98 01/03/2022    GLUF 119 (H) 01/20/2022    CALCIUM 8 6 01/20/2022    CORRECTEDCA 10 0 01/07/2022    AST 28 01/07/2022    ALT 23 01/07/2022    ALKPHOS 97 01/07/2022    PROT 6 8 11/23/2015    BILITOT 0 65 11/23/2015    EGFR 33 01/20/2022       Imaging: XR knee 1 or 2 vw left    Result Date: 1/4/2022  Narrative: LEFT KNEE INDICATION:   pain  No known trauma  COMPARISON:  None VIEWS:  XR KNEE 1 OR 2 VW LEFT FINDINGS: Bones are intact  Alignment preserved  Small tricompartment osteophytes  Lateral compartment joint space narrowing and mild subchondral sclerosis  No suspicious lytic or blastic bone lesion    Possible bipartite patella, developmental  Medial and lateral compartment chondrocalcinosis  Small posterior intra-articular loose bodies  Large joint effusion  Mild diffuse soft tissue swelling  Impression: No acute osseous abnormality  Osteoarthritis and joint effusion  Other nonemergent findings above  Workstation performed: [id number]     XR ankle 2 vw right    Result Date: 1/5/2022  Narrative: TRAUMA SERIES INDICATION:  TRAUMA  COMPARISON:  None VIEWS:  XR TRAUMA MULTIPLE Images: 3  FINDINGS: CHEST: Supine frontal view of the chest is obtained  Cardiomediastinal silhouette is within normal limits accounting for technique and patient positioning  Parenchymal opacities in the left upper lung, known tumor by prior imaging  Lungs otherwise appear generally clear  Please see subsequent CT report  No pneumothorax is seen on this supine film  CT imaging was already performed  No displaced fractures  Right chest port with catheter tip at the cavoatrial junction  RIGHT ANKLE: 2 views   Ankle mortise joint appears symmetric  No significant soft tissue swelling  No convincing fractures  Linear calcifications associated with the plantar fascia and Achilles tendon, these are chronic  Impression: No acute cardiopulmonary disease within limitations of supine imaging  Chronic opacity left upper lung known to be tumor by prior imaging  No acute fracture or dislocation right ankle  Workstation performed: [id number]     CT head wo contrast    Result Date: 1/12/2022  Narrative: CT BRAIN - WITHOUT CONTRAST INDICATION:   Mental status change, persistent or worsening R/o worsening ischemia/bleed  COMPARISON:  January 6, 2022 TECHNIQUE:  CT examination of the brain was performed  In addition to axial images, sagittal and coronal 2D reformatted images were created and submitted for interpretation  Radiation dose length product (DLP) for this visit:  886 mGy-cm     This examination, like all CT scans performed in the [facility], was performed utilizing techniques to minimize radiation dose exposure, including the use of iterative reconstruction and automated exposure control  IMAGE QUALITY:  Diagnostic  FINDINGS: PARENCHYMA: Decreased attenuation is noted in periventricular and subcortical white matter demonstrating an appearance that is statistically most likely to represent mild microangiopathic change, unchanged from prior examination  Continued evolution of infarction involving right caudate head, putamen, and anterior limb internal capsule  No intracranial mass, mass effect or midline shift  No acute parenchymal hemorrhage  VENTRICLES AND EXTRA-AXIAL SPACES:  Normal for the patient's age  VISUALIZED ORBITS AND PARANASAL SINUSES:  No acute abnormality involving the orbits  Opacification of left ethmoid air cells noted  Small volume of layering fluid/mucus in the left frontal sinus  CALVARIUM AND EXTRACRANIAL SOFT TISSUES:  Normal      Impression: No new intracranial abnormality  Continued evolution of late subacute to chronic right basal ganglia infarct  No hemorrhage or significant mass effect  Workstation performed: [id number]     CT head wo contrast    Result Date: 1/6/2022  Narrative: CT BRAIN - WITHOUT CONTRAST INDICATION:   Mental status change, unknown cause Altered mental status  COMPARISON:  1/4/2022 TECHNIQUE:  CT examination of the brain was performed  In addition to axial images, sagittal and coronal 2D reformatted images were created and submitted for interpretation  Radiation dose length product (DLP) for this visit:  860 mGy-cm   This examination, like all CT scans performed in the [facility], was performed utilizing techniques to minimize radiation dose exposure, including the use of iterative reconstruction and automated exposure control  IMAGE QUALITY:  Diagnostic   FINDINGS: PARENCHYMA: Decreased attenuation is noted in periventricular and subcortical white matter demonstrating an appearance that is statistically most likely to represent mild microangiopathic change  No intracranial mass, mass effect or midline shift  No acute parenchymal hemorrhage  There is increasing right basal ganglia hypodensity consistent with evolving acute infarct  VENTRICLES AND EXTRA-AXIAL SPACES:  Normal for the patient's age  VISUALIZED ORBITS AND PARANASAL SINUSES:  No acute abnormality involving the orbits  Mild scattered sinus mucosal thickening is noted  No fluid levels are seen  CALVARIUM AND EXTRACRANIAL SOFT TISSUES:  Normal      Impression: Evolving right basal ganglia infarct  No hemorrhage or significant mass effect  Workstation performed: [id number]     CT head wo contrast    Result Date: 1/4/2022  Narrative: CT BRAIN - WITHOUT CONTRAST INDICATION:   Mental status change, persistent or worsening altered mental status with increased headache  COMPARISON:  January 3, 2022 TECHNIQUE:  CT examination of the brain was performed  In addition to axial images, sagittal and coronal 2D reformatted images were created and submitted for interpretation  Radiation dose length product (DLP) for this visit:  954 mGy-cm   This examination, like all CT scans performed in the [facility], was performed utilizing techniques to minimize radiation dose exposure, including the use of iterative reconstruction and automated exposure control  IMAGE QUALITY:  Diagnostic  FINDINGS: PARENCHYMA: Decreased attenuation is noted in periventricular and subcortical white matter demonstrating an appearance that is statistically most likely to represent mild microangiopathic change; this appearance is similar when compared to most recent prior examination  Chronic lacunar infarction(s) are noted in basal ganglia, unchanged from prior exam  No CT signs of acute infarction  No intracranial mass, mass effect or midline shift  No acute parenchymal hemorrhage   VENTRICLES AND EXTRA-AXIAL SPACES: Normal for the patient's age  VISUALIZED ORBITS AND PARANASAL SINUSES:  Diffuse mucoperiosteal thickening both maxillary sinuses and sphenoid sinus  Scattered opacified ethmoid air cells bilaterally  CALVARIUM AND EXTRACRANIAL SOFT TISSUES:  Normal      Impression: No acute intracranial abnormality  Workstation performed: [id number]     TRAUMA - CT head wo contrast    Addendum Date: 1/3/2022 Addendum:   ADDENDUM: Comparison was made to a previous brain MRI performed under separate medical record number on November 1, 2021  I personally discussed this study with Palmira Fletcher on 1/3/2022 at 8:43 PM     Result Date: 1/3/2022  Narrative: CT BRAIN - WITHOUT CONTRAST INDICATION:   TRAUMA  COMPARISON:  Brain MRI dated November 1, 2021 TECHNIQUE:  CT examination of the brain was performed  In addition to axial images, sagittal and coronal 2D reformatted images were created and submitted for interpretation  Radiation dose length product (DLP) for this visit:  1089 mGy-cm   This examination, like all CT scans performed in the [facility], was performed utilizing techniques to minimize radiation dose exposure, including the use of iterative reconstruction and automated exposure control  IMAGE QUALITY:  Diagnostic  FINDINGS: PARENCHYMA:  No intracranial mass, mass effect or midline shift  No CT signs of acute infarction  No acute parenchymal hemorrhage  Stable lacunar infarct in the left basal ganglion  VENTRICLES AND EXTRA-AXIAL SPACES:  Normal for the patient's age  VISUALIZED ORBITS AND PARANASAL SINUSES:  Diffuse mucoperiosteal thickening both maxillary sinuses and sphenoid sinus  Scattered opacified ethmoid air cells bilaterally  CALVARIUM AND EXTRACRANIAL SOFT TISSUES:  Mild soft tissue swelling left forehead  Impression: No acute intracranial abnormality   Workstation performed: [id number]     TRAUMA - CT spine cervical wo contrast    Addendum Date: 1/3/2022 Addendum:   ADDENDUM: I personally discussed this study with Yashira Martinez on 1/3/2022 at 8:43 PM      Result Date: 1/3/2022  Narrative: CT CERVICAL SPINE - WITHOUT CONTRAST INDICATION:   TRAUMA  COMPARISON:  Pet/CT dated July 14, 2021 performed under a separate medical record number  TECHNIQUE:  CT examination of the cervical spine was performed without intravenous contrast   Contiguous axial images were obtained  Sagittal and coronal reconstructions were performed  Radiation dose length product (DLP) for this visit:  492 mGy-cm   This examination, like all CT scans performed in the [facility], was performed utilizing techniques to minimize radiation dose exposure, including the use of iterative reconstruction and automated exposure control  IMAGE QUALITY:  Diagnostic  FINDINGS: ALIGNMENT:  Straightening of lordotic curvature  Slight concave right scoliosis  No spondylolisthesis  VERTEBRAL BODIES:  No fracture  DEGENERATIVE CHANGES:  Severe multilevel cervical degenerative changes are noted  Posterior osteophytes and partially calcified bulging disc material seen at multiple levels, most pronounced at C3-C4 on the left, grossly stable from prior PET scan  No critical central canal stenosis  PREVERTEBRAL AND PARASPINAL SOFT TISSUES: Heterogeneous thyroid glands with ill-defined nodules measuring less than 1 cm  Incidental discovery of one or more thyroid nodule(s) measuring less than 1 5 cm and without suspicious features is noted in this patient who is above 28years old; according to guidelines published in the February 2015 white paper on incidental thyroid nodules in the Journal of the [employer] of Radiology [facility]), no further evaluation is recommended  THORACIC INLET:  Scattered parenchymal opacities in the periphery of both lungs more so on the left than right  Impression: No cervical spine fracture or traumatic malalignment    Workstation performed: [id number]     MRA head wo contrast    Result Date: 1/5/2022  Narrative: MRA BRAIN WITHOUT CONTRAST INDICATION:  n Stroke on noncontrast MRI, lung cancer history, evaluate for metastatic disease and vessel imaging COMPARISON:  None  TECHNIQUE:  Axial 3-D time-of-flight imaging with 3-D reconstructions performed without contrast    IV Contrast:  Not administered  FINDINGS: IMAGE QUALITY:  Diagnostic  ANATOMY INTERNAL CAROTID ARTERIES:  Normal flow related signal of the distal cervical, petrous and cavernous segments of the internal carotid arteries  Normal ICA terminus  ANTERIOR CIRCULATION:  Normal A1 segments  Normal anterior communicating artery  Normal flow-related signal of the anterior cerebral arteries  MIDDLE CEREBRAL ARTERY CIRCULATION:  The M1 segment and middle cerebral artery branches demonstrate normal flow-related signal  DISTAL VERTEBRAL ARTERIES:  Distal vertebral arteries are patient with a normal vertebrobasilar junction  The posterior inferior cerebellar artery origins are normal  BASILAR ARTERY:  Normal  POSTERIOR CEREBRAL ARTERIES:  Both posterior cerebral arteries arises from the basilar tip  Both arteries demonstrate normal flow-related enhancement  Patent posterior communicating arteries  Impression: No occlusive disease  Workstation performed: [id number]     MRA carotids wo and w contrast    Result Date: 1/5/2022  Narrative: MRA NECK - WITH AND WITHOUT CONTRAST INDICATION: n Stroke on noncontrast MRI, lung cancer history, evaluate for metastatic disease and vessel imaging COMPARISON:  None  TECHNIQUE:  Gadolinium enhanced and noncontrast examination with reconstructions  2-D time-of- flight imaging with 3-D reconstruction  IV Contrast:  8 mL of Gadobutrol injection (SINGLE-DOSE) FINDINGS: IMAGE QUALITY:  Diagnostic  AORTIC ARCH:  Normal  VISUALIZED SUBCLAVIAN ARTERIES:  The subclavian arteries demonstrate normal enhancement with no stenosis  VERTEBRAL ARTERIES:  Normal vertebral artery origins   The vertebral arteries are bilaterally symmetric with normal enhancement and no evidence of stenosis  Normal vertebral basilar junction  RIGHT CAROTID ARTERY: Normal common carotid artery, cervical internal carotid artery and external carotid artery  No stenosis at the bifurcation  LEFT CAROTID ARTERY:  Normal common carotid artery, cervical internal carotid artery and external carotid artery  No stenosis at the bifurcation  VISUALIZED INTRACRANIAL VASCULATURE:  Limited visualization of the intracranial vasculature is grossly unremarkable  NASCET criteria was used to determine the degree of internal carotid artery diameter stenosis  Impression: No occlusive disease  Workstation performed: [id number]     MRI brain wo contrast    Result Date: 1/4/2022  Narrative: MRI BRAIN WITHOUT CONTRAST INDICATION: change in mental status headache falls ? brain mets  COMPARISON:   None  TECHNIQUE:  Sagittal T1, axial T2, axial FLAIR, axial T1, axial Ruso and axial diffusion imaging  IMAGE QUALITY:  Diagnostic  FINDINGS: BRAIN PARENCHYMA:  There is no discrete mass, mass effect or midline shift  There is no intracranial hemorrhage  Right basal ganglia and right caudate diffusion restriction with T2/FLAIR signal abnormality consistent with acute to subacute ischemia  Small scattered hyperintensities on T2/FLAIR imaging are noted in the periventricular and subcortical white matter demonstrating an appearance that is statistically most likely to represent mild microangiopathic change  VENTRICLES:  Normal for the patient's age  SELLA AND PITUITARY GLAND:  Normal  ORBITS:  Normal  PARANASAL SINUSES:  Normal  VASCULATURE:  Evaluation of the major intracranial vasculature demonstrates appropriate flow voids  CALVARIUM AND SKULL BASE:  Normal  EXTRACRANIAL SOFT TISSUES:  Normal      Impression: Right basal ganglia and right caudate acute ischemia   Findings were marked as "immediate"in Epic and will now be related to the ordering physician or covering clinical team by the radiology liaison  Workstation performed: [id number]     MRI brain w contrast    Result Date: 1/5/2022  Narrative: MRI BRAIN WITH INTRAVENOUS CONTRAST INDICATION: Stroke on noncontrast MRI, lung cancer history, evaluate for metastatic disease and vessel imaging  COMPARISON:  Recent precontrast brain MRI TECHNIQUE:  No precontrast imaging was performed  Sagittal, axial and coronal T1 postcontrast    Axial BRAVO post contrast   IV Contrast:  8 mL of Gadobutrol injection (SINGLE-DOSE)  IMAGE QUALITY:   Diagnostic  FINDINGS: POSTCONTRAST IMAGING:  Postcontrast imaging of the brain demonstrates no abnormal enhancement  ADDITIONAL PERTINENT FINDINGS:  None  Impression: No enhancing lesions  Workstation performed: [id number]     XR chest 1 view    Result Date: 1/5/2022  Narrative: TRAUMA SERIES INDICATION:  TRAUMA  COMPARISON:  None VIEWS:  XR TRAUMA MULTIPLE Images: 3  FINDINGS: CHEST: Supine frontal view of the chest is obtained  Cardiomediastinal silhouette is within normal limits accounting for technique and patient positioning  Parenchymal opacities in the left upper lung, known tumor by prior imaging  Lungs otherwise appear generally clear  Please see subsequent CT report  No pneumothorax is seen on this supine film  CT imaging was already performed  No displaced fractures  Right chest port with catheter tip at the cavoatrial junction  RIGHT ANKLE: 2 views   Ankle mortise joint appears symmetric  No significant soft tissue swelling  No convincing fractures  Linear calcifications associated with the plantar fascia and Achilles tendon, these are chronic  Impression: No acute cardiopulmonary disease within limitations of supine imaging  Chronic opacity left upper lung known to be tumor by prior imaging  No acute fracture or dislocation right ankle   Workstation performed: [id number]     TRAUMA - CT chest abdomen pelvis w contrast    Result Date: 1/3/2022  Narrative: CT CHEST, ABDOMEN AND PELVIS WITH IV CONTRAST INDICATION:   TRAUMA  COMPARISON:  Pet/CT dated July 14, 2021 performed under a separate medical record number  TECHNIQUE: CT examination of the chest, abdomen and pelvis was performed  Axial, sagittal, and coronal 2D reformatted images were created from the source data and submitted for interpretation  3D reconstructions of the bony thorax were performed in order to improved sensitivity of evaluation for rib fractures  Radiation dose length product (DLP) for this visit:  51-41-72-48 mGy-cm   This examination, like all CT scans performed in the [facility], was performed utilizing techniques to minimize radiation dose exposure, including the use of iterative reconstruction and automated exposure control  IV Contrast:  100 mL of iohexol (OMNIPAQUE) Enteric Contrast: Enteric contrast was administered  FINDINGS: CHEST LUNGS:  There is no tracheal or endobronchial lesion  Retained secretions in the trachea and mainstem bronchi  Scattered groundglass opacities in the periphery of the right lung  Previous rounded opacity in the medial right lower lobe has diminished to a small spiculated lesion measuring approximately 2 cm, this previously measured as much has 3 7 cm and was FDG  avid  Trace right-sided pleural effusion  There are scattered groundglass opacities periphery of the left lung is well which are new from the prior exam   Previous opacity in the left upper lobe is reidentified measuring up to 5 cm, previously as much as 7 8 cm but more consolidated on today's exam which may be due to postobstructive atelectasis  Previous opacities in the superior segment of the left lower lobe appear increased on today's exam, but are difficult to differentiate from one is presumed to be associated loculated pleural fluid and atelectasis  Accurate measurement is not possible  Parenchymal opacities in the left lower lobe    Increasing left side loculated pleural effusion medially PLEURA:  Increasing small right effusion  Increasing partially loculated left pleural effusion  HEART/GREAT VESSELS: Cardiac size within normal limits  No pericardial effusion  Coronary and aortic atherosclerosis no aortic aneurysm or transection  MEDIASTINUM AND ANABEL:  Unremarkable  CHEST WALL AND LOWER NECK:   Heterogeneous thyroid gland with subcentimeter nodules  Right side port with catheter tip at the cavoatrial junction  ABDOMEN LIVER/BILIARY TREE:  Mild intrahepatic and extrahepatic biliary ductal dilatation, considered normal status post cholecystectomy and stable from prior exam   Subcentimeter low-attenuation hepatic lesions which are too small to characterize likely representing small cysts  GALLBLADDER:  Removed SPLEEN:  Top normal sized spleen  No discrete splenic lesion or laceration  PANCREAS:  There is again a cystic lesion in the pancreatic tail measuring up to 2 9 cm AP  No abnormal enhancement  Stable 9 mm cystic structure anterior surface of the pancreatic neck/body, also stable  No pancreatic duct dilatation  ADRENAL GLANDS:  Unremarkable  KIDNEYS/URETERS:  Cortical scarring and mild atrophy  No hydronephrosis or renal injury  STOMACH AND BOWEL:  Limited evaluation of GI tract without oral contrast   Small hiatal hernia with reflux  Stomach unremarkable  Small bowel is average caliber  Colonic diverticulosis  No evidence of bowel injury  APPENDIX:  No findings to suggest appendicitis  ABDOMINOPELVIC CAVITY:  No ascites  No pneumoperitoneum  No lymphadenopathy  VESSELS:  Unremarkable for patient's age  PELVIS REPRODUCTIVE ORGANS:  Prior hysterectomy  URINARY BLADDER:  Mild bladder wall thickening which may be due to incomplete distention  ABDOMINAL WALL/INGUINAL REGIONS:  Postoperative changes are stable  Mild soft tissue edema in the right perineum/buttock with apparent 2 cm soft tissue hematoma in the perianal fat  No active bleeding   OSSEOUS STRUCTURES:  No acute fracture or destructive osseous lesion  Impression: Suspected small post traumatic hematoma measuring 2 cm in the soft tissues of the right buttock/perianal region  No active bleeding  No visceral or osseous injury identified  Scattered groundglass opacities in both lungs which are new from prior exam   Consider infectious or inflammatory etiologies including Covid viral pneumonia  Previous right lower lobe lung mass has diminished since July  Previous left upper lobe tumor has also decreased in size but appears more atelectatic  Increasing water attenuation pleural fluid, partially loculated pleural fluid in the left lower lung  Follow-up per cancer imaging protocol  I personally discussed this study with Marcela Summers on 1/3/2022 at 8:43 PM  Stable pancreatic cystic lesions follow-up per protocol  Small hiatal hernia with gastroesophageal reflux  Colonic diverticulosis  Mild bladder wall thickening which may be Due to incomplete distention  Workstation performed: [id number]     XR Trauma multiple ([facility]B/[facility] trauma bay ONLY)    Result Date: 1/3/2022  Narrative: TRAUMA SERIES INDICATION:  TRAUMA  COMPARISON:  None VIEWS:  XR TRAUMA MULTIPLE Images: 3  FINDINGS: CHEST: Supine frontal view of the chest is obtained  Cardiomediastinal silhouette is within normal limits accounting for technique and patient positioning  Parenchymal opacities in the left upper lung, known tumor by prior imaging  Lungs otherwise appear generally clear  Please see subsequent CT report  No pneumothorax is seen on this supine film  CT imaging was already performed  No displaced fractures  Right chest port with catheter tip at the cavoatrial junction  RIGHT ANKLE: 2 views   Ankle mortise joint appears symmetric  No significant soft tissue swelling  No convincing fractures  Linear calcifications associated with the plantar fascia and Achilles tendon, these are chronic        Impression: No acute cardiopulmonary disease within limitations of supine imaging  Chronic opacity left upper lung known to be tumor by prior imaging  No acute fracture or dislocation right ankle  Workstation performed: [id number]     EEG Routine and awake    Result Date: 2022  Narrative: ELECTROENCEPHALOGRAM (EEG) Patient Name:  Diane Aguiar  MRN: [id number] :  1950 File #: [id number] Age: 66 y o  Date performed: 2022        Report date: 2022      Study type: Routine EEG ICD 10 diagnosis: Encephalopathy, unspecified G93 40 Start time: 12:35 End time: 13:06 --------------------------------------------------------------------------- ---------------------------------------- Patient History: This recording was observed in a 66 y o  female to evaluate for seizure as a cause of encephalopathy  Medications include: buspirone, venlafaxine --------------------------------------------------------------------------- ---------------------------------------- Description of Procedure: · 32 channel digital recording with electrodes placed according to the International 10-20 system with additional T1/T2 electrodes, EOG, EKG, and simultaneous video  The recording was technically satisfactory  --------------------------------------------------------------------------- ---------------------------------------- EEG Description: The recording was performed with the patient awake  She was oriented to self, location, president  During wakefulness, there were long runs of poorly regulated, medium amplitude, posteriorly dominant, symmetric 7-8 cps rhythm that attenuated with eye opening  There were symmetric low amplitude, frontally dominant beta activities  There were intermittent, diffuse, medium amplitude 2-3 cps delta activities  Drowsiness and sleep were not attained  --------------------------------------------------------------------------- ---------------------------------------- Activation Procedures: Hyperventilation was not performed   Stepped photic stimulation between 1-30 cps was performed and did not induce any changes  Other findings: The single lead ECG demonstrated normal sinus rhythm --------------------------------------------------------------------------- ---------------------------------------- EEG Interpretation: This Routine EEG recorded during wakefulness is abnormal  Background activities and posteriorly dominant rhythm are mildly too slow suggesting mild diffuse cerebral dysfunction of nonspecific etiology  Willy Lennox, MD [facility]     bedside procedure    Result Date: 1/3/2022  Narrative: [phone] [id number]  5 [id number]  1 [id number]    Echo complete w/ contrast if indicated    Result Date: 1/5/2022  Narrative: Valeria Akers  Left Ventricle: Left ventricular cavity size is normal  The left ventricular ejection fraction is 60%  Systolic function is normal  Wall motion is normal  Diastolic function is mildly abnormal, consistent with grade I (abnormal) relaxation  Wall thickness is mildly increased  There is concentric remodeling    Right Ventricle: Right ventricular cavity size is normal  Systolic function is normal    Aortic Valve: There is mild regurgitation  ROS:  As mentioned in HPI & Interval History otherwise 14 point ROS negative  Allergies: Allergies   Allergen Reactions    Amoxicillin Hives    Amoxicillin Rash and Hives    Cardizem [Diltiazem] Rash     Rash      Statins Myalgia     Severe muscle aching  Terrible pains    Zofran [Ondansetron] Palpitations     Current Medications: Reviewed  PMH/FH/SH:  Reviewed      Physical Exam:    There is no height or weight on file to calculate BSA      Ht Readings from Last 3 Encounters:   01/11/22 5' 4" (1 626 m)   01/05/22 5' 4" (1 626 m)   12/17/21 5' 4 02" (1 626 m)        Wt Readings from Last 3 Encounters:   01/11/22 74 9 kg (165 lb 2 oz)   01/05/22 80 7 kg (178 lb)   12/17/21 81 kg (178 lb 9 2 oz)        Temp Readings from Last 3 Encounters:   22 97 6 °F (36 4 °C) (Tympanic)   22 98 5 °F (36 9 °C) (Oral)   21 97 5 °F (36 4 °C) (Temporal)        BP Readings from Last 3 Encounters:   22 160/70   22 138/67   21 166/70           Physical Exam  Vitals reviewed  Constitutional:       General: She is not in acute distress  Appearance: She is well-developed  She is not diaphoretic  HENT:      Head: Normocephalic and atraumatic  Eyes:      Conjunctiva/sclera: Conjunctivae normal    Neck:      Trachea: No tracheal deviation  Cardiovascular:      Rate and Rhythm: Normal rate and regular rhythm  Heart sounds: No murmur heard  No friction rub  No gallop  Pulmonary:      Effort: Pulmonary effort is normal  No respiratory distress  Breath sounds: Normal breath sounds  No wheezing or rales  Chest:      Chest wall: No tenderness  Abdominal:      General: There is no distension  Palpations: Abdomen is soft  Tenderness: There is no abdominal tenderness  Musculoskeletal:      Cervical back: Normal range of motion and neck supple  Lymphadenopathy:      Cervical: No cervical adenopathy  Skin:     General: Skin is warm and dry  Coloration: Skin is not pale  Findings: No erythema  Neurological:      Mental Status: She is alert and oriented to person, place, and time  Psychiatric:         Behavior: Behavior normal          Thought Content:  Thought content normal          Judgment: Judgment normal          ECO      Emergency Contacts:    Extended Emergency Contact Information  Primary Emergency Contact: Patel Reinoso  Address: [address] [address] [place] [employer] Phone: [phone]  Relation: Brother  Secondary Emergency Contact: Liz Booth  Address: [address] [place] [address] [employer] Phone: [phone]  Relation: Daughter

## 2022-01-24 NOTE — OCCUPATIONAL THERAPY NOTE
OT DISCHARGE SUMMARY    Pt made good progress during stay on the [facility] following admission for acute ischemic stroke  Pt presented upon IE with generalized weakness, decreased endurance, activity tolerance, and functional mobility  On evaluation, pt required Reta to complete all ADLs and functional transfers  Pt was discharged to home with 24/7 supervision from dtr  Pt currently functioning at S level for ADL, S level for transfers with RW  The following DME was recommended shower chair, which pt had  Family training formally did not occur, but therapist spoke with pts americor Adam Shukla on the phone  Therapist spoke with dtr Adam Shukla ([phone]), to follow up on dtr hiring 24/7 supervision as dtr reported last week she was going to look into HHA  Dtr reporting she is going to take FMLA starting 1/24/22 for at least 2 weeks and will be staying with pt immediately after DC  Dtr reports that she wants to see how pt is doing in her own environment and from there decide if she needs to hire a HHA  If not dtr reports that her Sister in law can work from home and stay with pt as well providing 24/7 supervision  Dtr verbalizes understanding that pt will need A with meds, meals and IADLs which she reports family can A  Dtr also reported that she will be removing stove knobs and taking pts car keys  Therapist communicated with dtr that she will relay this information during standup and if team feels like pt can DC this week CM will be in contact   OT recommended pt continue to receive home OT services       -Julee Cunningham MS, OTR/L

## 2022-01-25 ENCOUNTER — PATIENT OUTREACH (OUTPATIENT)
Dept: FAMILY MEDICINE CLINIC | Facility: CLINIC | Age: 72
End: 2022-01-25

## 2022-01-25 NOTE — PROGRESS NOTES
Chart reviewed, hand off bundled episode  Discharged to home with [address]  First RN visit made on 1/22/22, Physical therapy visit on 1/24/22  To receive Occupational therapy and Speech therapy

## 2022-01-25 NOTE — SPEECH THERAPY NOTE
SLP Discharge Summary     Pt was discharged home w/ family support/supervision on 1/21/2022  At time of discharge, pt was able to achieve most of LTG's as established on initial assessment, but continued to demonstrate most significant deficits given executive function skills and memory  Pt completed formalized cognitive linguistic assessment, CLQT in which overall score was 2 2 out of 4 0 when compared to age matched peers between 65-80 year old  Pt while pt demonstrated deficits across all cognitive domains assessed, the most impacted were attention, executive function and visuospatial skills  Additionally noted during assessment was some L inattention to items on the pages during tasks, as well as significantly decreased comprehension given directions for symbol trails task which pt did not comprehend directions at all, noting impulsivity trying to write over SLP, etc  Pt exhibiting decreased awareness given ANY errors which presented during the assessment as well  When engaging in interview in addition to completing assessment, pt was accurate in eliciting reasoning for hospitalization due to her falls, but probing questions needed for true reasoning of "stroke " Pt was able to recall current place/city as well as current month but not year  Pt does report independence prior to admission, as 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 accessible house  Pt reported completing I ADL's such as cooking, cleaning, laundry, management of medications and driving, but brother takes care of finances  Pt is  and stated having supportive children, dtr and son, but also her friend, Joselyn Harris (who was in visiting prior to initiation of session) also is very supportive and lives close to pt  Even in d/w pt's friend as she was leaving, pt did verbalize to her about leaving clothing on the door knob for her to bring in  Pt's friend noted to reorient pt to current situation as well   At this time, pt is noted to demonstrate cognitive linguistic deficits including the following: decreased comprehension, decreased executive function skills (problem solving, reasoning, sequencing, judgement, insight), decreased ST/working memory, fatigue, slower processing and suspected visual spatial difficulty, which currently impact pt's overall safety and functional mobility  While in the acute rehab center, pt made slower progress towards her goals and currently functioning at supervision for expression and comprehension and mod A problem solving and memory  Pt's with barriers in decreased executive function skills (problem solving, reasoning, sequencing, judgement, insight), decreased ST/working memory, fatigue, and slower processing which overall impact pt's safety and functional mobility  Recommendations at this time are for 24hr supervision/assistance at home  Pt would benefit from further skilled SLP services in outpt setting once ready to maximize overall functional independence of cognitive linguistic skills to decrease burden of care for family at time

## 2022-01-25 NOTE — PROGRESS NOTES
01/25/22 1159   Hello, [Guardians Name / Donna Monk, this is [Caller Selene Arboleda from [facility], and our clinical care team wanted to check on you / your child after your recent visit to the hospital  It will only take 3-5 minutes  Is this a good time? Discharge Call Type/ Specific Diagnosis: General Call   General Discharge Phone Call   Were your/your child's discharge instructions clear and understandable? Please tell me in your own words how to care for yourself/your child now that you're home Yes;Patient understood instructions   Have you filled your/your child's new prescriptions yet? Yes   What questions do you have about those medications? No questions   Are you/your child having any unusual symptoms or problems? (Specific to problem- i e , dressing, pain, bruising or swelling, procedure, etc ) No reported symptoms/problems   Do you have follow up appointment with your/your child's physician? Yes   Is there anything preventing you from keeping that appointment? No   Are there any physicians, nurses, or hospital staff you would like us to recognize for doing a very good job? Nurse;PCA/Tech;PT/OT/RT/SLP  (Everyone did a great job)   Thank you for taking the time to share with me about your care and recovery  Do you have any suggestions for us? No   This call resulted in: No interventions needed   Call Complete   Attempted Number of Calls 2   Discharge phone call complete? Complete   Hi, This is ________ from [facility]  This is just a courtesy call, and there is no need to call us back  Have a great day     (Called by [place])

## 2022-01-27 ENCOUNTER — PATIENT OUTREACH (OUTPATIENT)
Dept: FAMILY MEDICINE CLINIC | Facility: CLINIC | Age: 72
End: 2022-01-27

## 2022-01-27 ENCOUNTER — OFFICE VISIT (OUTPATIENT)
Dept: FAMILY MEDICINE CLINIC | Facility: CLINIC | Age: 72
End: 2022-01-27
Payer: MEDICARE

## 2022-01-27 VITALS
WEIGHT: 180.6 LBS | HEART RATE: 71 BPM | TEMPERATURE: 97.3 F | OXYGEN SATURATION: 100 % | BODY MASS INDEX: 30.83 KG/M2 | RESPIRATION RATE: 14 BRPM | DIASTOLIC BLOOD PRESSURE: 92 MMHG | SYSTOLIC BLOOD PRESSURE: 130 MMHG | HEIGHT: 64 IN

## 2022-01-27 DIAGNOSIS — I50.30 HYPERTENSIVE HEART DISEASE WITH DIASTOLIC CONGESTIVE HEART FAILURE, UNSPECIFIED HF CHRONICITY (HCC): ICD-10-CM

## 2022-01-27 DIAGNOSIS — I48.91 ATRIAL FIBRILLATION, UNSPECIFIED TYPE (HCC): ICD-10-CM

## 2022-01-27 DIAGNOSIS — I63.9 ACUTE ISCHEMIC STROKE (HCC): ICD-10-CM

## 2022-01-27 DIAGNOSIS — I11.0 HYPERTENSIVE HEART DISEASE WITH DIASTOLIC CONGESTIVE HEART FAILURE, UNSPECIFIED HF CHRONICITY (HCC): ICD-10-CM

## 2022-01-27 DIAGNOSIS — C79.51 SECONDARY MALIGNANT NEOPLASM OF BONE AND BONE MARROW (HCC): ICD-10-CM

## 2022-01-27 DIAGNOSIS — F32.5 MAJOR DEPRESSIVE DISORDER WITH SINGLE EPISODE, IN FULL REMISSION (HCC): ICD-10-CM

## 2022-01-27 DIAGNOSIS — I10 PRIMARY HYPERTENSION: ICD-10-CM

## 2022-01-27 DIAGNOSIS — E11.9 CONTROLLED TYPE 2 DIABETES MELLITUS WITHOUT COMPLICATION, WITHOUT LONG-TERM CURRENT USE OF INSULIN (HCC): ICD-10-CM

## 2022-01-27 DIAGNOSIS — Z09 HOSPITAL DISCHARGE FOLLOW-UP: Primary | ICD-10-CM

## 2022-01-27 DIAGNOSIS — C79.52 SECONDARY MALIGNANT NEOPLASM OF BONE AND BONE MARROW (HCC): ICD-10-CM

## 2022-01-27 PROBLEM — S80.12XA CONTUSION OF LEFT LOWER LEG: Status: RESOLVED | Noted: 2022-01-03 | Resolved: 2022-01-27

## 2022-01-27 PROBLEM — T14.8XXA HEMATOMA: Status: RESOLVED | Noted: 2022-01-03 | Resolved: 2022-01-27

## 2022-01-27 PROCEDURE — 99214 OFFICE O/P EST MOD 30 MIN: CPT | Performed by: FAMILY MEDICINE

## 2022-01-27 NOTE — ASSESSMENT & PLAN NOTE
Metformin was held due to creatinine but sugars are increasing  Restart at 500mg bid  Lab Results   Component Value Date    HGBA1C 5 8 (H) 01/05/2022

## 2022-01-27 NOTE — PROGRESS NOTES
Assessment/Plan:    1  Hospital discharge follow-up    2  Controlled type 2 diabetes mellitus without complication, without long-term current use of insulin (HCC)  Assessment & Plan:  Metformin was held due to creatinine but sugars are increasing  Restart at 500mg bid  Lab Results   Component Value Date    HGBA1C 5 8 (H) 01/05/2022       Orders:  -     metFORMIN (GLUCOPHAGE) 1000 MG tablet; Take 0 5 tablets (500 mg total) by mouth 2 (two) times a day with meals    3  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)  Assessment & Plan:  Seeing heme  onc      4  Major depressive disorder with single episode, in full remission ([facility] )  Assessment & Plan:  Stable   Cont effexor      5  Acute ischemic stroke [facility])  Assessment & Plan:  Possible due to chemo  Improving  Pt/ot signed off  Seeing speech today      6  Primary hypertension  Assessment & Plan:  bp is increasing slowing  Increase losartan to 50mg  Amlodipine on hold for eden      7  Hypertensive heart disease with diastolic congestive heart failure, unspecified HF chronicity ([facility] )  Assessment & Plan:  Wt Readings from Last 3 Encounters:   01/27/22 81 9 kg (180 lb 9 6 oz)   01/24/22 79 6 kg (175 lb 8 oz)   01/11/22 74 9 kg (165 lb 2 oz)     Lasix was d/c at hospitalization   Will need to watch for fluid overload          8  Atrial fibrillation, unspecified type [facility])  Assessment & Plan:  Was on eliquis  Now also on asa      BMI Counseling: Body mass index is 31 kg/m²  The BMI is above normal  Nutrition recommendations include encouraging healthy choices of fruits and vegetables  No pharmacotherapy was ordered  Rationale for BMI follow-up plan is due to patient being overweight or obese  There are no Patient Instructions on file for this visit  No follow-ups on file  Subjective:      Patient ID: Yajaira Pearce is a 66 y o  female      Chief Complaint   Patient presents with    Transition of Care Management     patient being seen for TCM Here for follow up  Diagnosed with stroke -possibly caused by chemo  Was in rehab for 10 days for cognitive rehab  Strength is good  meds stopped-amlodipine, furosemide and and metformin  Blood sugars creeping up and blood pressure also slowly creeping up  Eating more    Hypertension  This is a chronic problem  The current episode started more than 1 year ago  The problem is unchanged  The problem is controlled  There are no associated agents to hypertension  Risk factors for coronary artery disease include diabetes mellitus and dyslipidemia  The current treatment provides moderate improvement  There are no compliance problems          The following portions of the patient's history were reviewed and updated as appropriate: allergies, current medications, past family history, past medical history, past social history, past surgical history and problem list     Review of Systems      Current Outpatient Medications   Medication Sig Dispense Refill    acetaminophen (TYLENOL) 325 mg tablet Take 650 mg by mouth every 6 (six) hours as needed for mild pain      acetaminophen (TYLENOL) 325 mg tablet Take 2 tablets (650 mg total) by mouth every 6 (six) hours as needed (mild pain)  0    apixaban (Eliquis) 5 mg TAKE 1 TABLET TWICE A  tablet 3    apixaban (Eliquis) 5 mg Take 5 mg by mouth 2 (two) times a day      aspirin (ECOTRIN LOW STRENGTH) 81 mg EC tablet Take 1 tablet (81 mg total) by mouth daily 30 tablet 0    atorvastatin (LIPITOR) 80 mg tablet Take 1 tablet (80 mg total) by mouth daily with dinner 30 tablet 0    busPIRone (BUSPAR) 7 5 mg tablet TAKE 1 TABLET (7 5 MG TOTAL) BY MOUTH EVERY 12 (TWELVE) HOURS 60 tablet 0    busPIRone (BUSPAR) 7 5 mg tablet Take 7 5 mg by mouth 2 (two) times a day      Cholecalciferol (VITAMIN D) 2000 units CAPS Take by mouth      cholecalciferol (VITAMIN D3) 1,000 units tablet Take 2,000 Units by mouth daily      cyanocobalamin (VITAMIN B-12) 100 mcg tablet Take by mouth daily      cyanocobalamin (VITAMIN B-12) 100 MCG tablet Take 100 mcg by mouth daily      Dexamethasone 0 4 MG INST       folic acid (FOLVITE) 1 mg tablet Take 1 tablet (1 mg total) by mouth daily 90 tablet 1    folic acid (FOLVITE) 1 mg tablet Take 1 mg by mouth daily      linaGLIPtin (Tradjenta) 5 MG TABS Take 5 mg by mouth daily 90 tablet 3    loperamide (IMODIUM) 2 mg capsule Take 2 mg by mouth 4 (four) times a day as needed for diarrhea      loratadine (CLARITIN) 10 mg tablet Take 10 mg by mouth as needed        metoprolol tartrate (LOPRESSOR) 25 mg tablet Take 1 tablet (25 mg total) by mouth every 12 (twelve) hours 180 tablet 3    omeprazole (PriLOSEC) 20 mg delayed release capsule Take 1 capsule (20 mg total) by mouth daily 90 capsule 3    sotalol (BETAPACE) 80 mg tablet Take 1 tablet (80 mg total) by mouth 2 (two) times a day 1 tab PO  tablet 3    venlafaxine (EFFEXOR) 37 5 mg tablet Take 1 tablet (37 5 mg total) by mouth daily 90 tablet 3    losartan (COZAAR) 50 mg tablet Take 1 tablet (50 mg total) by mouth daily (Patient not taking: Reported on 1/27/2022 ) 90 tablet 3    metFORMIN (GLUCOPHAGE) 1000 MG tablet Take 0 5 tablets (500 mg total) by mouth 2 (two) times a day with meals 180 tablet 3     No current facility-administered medications for this visit  Objective:    /92 (BP Location: Left arm, Patient Position: Sitting, Cuff Size: Standard)   Pulse 71   Temp (!) 97 3 °F (36 3 °C) (Tympanic)   Resp 14   Ht 5' 4" (1 626 m)   Wt 81 9 kg (180 lb 9 6 oz)   LMP  (LMP Unknown)   SpO2 100%   BMI 31 00 kg/m²        Physical Exam  Vitals and nursing note reviewed  Constitutional:       Appearance: Normal appearance  HENT:      Head: Normocephalic and atraumatic  Eyes:      Extraocular Movements: Extraocular movements intact  Pupils: Pupils are equal, round, and reactive to light  Cardiovascular:      Rate and Rhythm: Normal rate and regular rhythm  Pulses: Normal pulses  Heart sounds: Normal heart sounds  Pulmonary:      Effort: Pulmonary effort is normal       Breath sounds: Normal breath sounds  Neurological:      General: No focal deficit present  Mental Status: She is alert and oriented to person, place, and time  Psychiatric:         Mood and Affect: Mood normal          Behavior: Behavior normal          Thought Content:  Thought content normal          Judgment: Judgment normal                 Philly Gtz DO

## 2022-01-27 NOTE — ASSESSMENT & PLAN NOTE
Wt Readings from Last 3 Encounters:   01/27/22 81 9 kg (180 lb 9 6 oz)   01/24/22 79 6 kg (175 lb 8 oz)   01/11/22 74 9 kg (165 lb 2 oz)     Lasix was d/c at hospitalization   Will need to watch for fluid overload

## 2022-01-28 ENCOUNTER — TELEPHONE (OUTPATIENT)
Dept: OTHER | Facility: OTHER | Age: 72
End: 2022-01-28

## 2022-01-28 NOTE — PROGRESS NOTES
Introduced self to Awais at PCP appointment  Provided my contact information to patient and her daughter  Agrees to bundled program     She is independent with her personal care  Her daughter assists her in managing her medications and providing transportation assistance  She is receiving home health services from [address] [place] [address] very supportive  Denies further questions or need for additional resources at this time

## 2022-01-31 ENCOUNTER — TELEPHONE (OUTPATIENT)
Dept: NEUROLOGY | Facility: CLINIC | Age: 72
End: 2022-01-31

## 2022-01-31 NOTE — TELEPHONE ENCOUNTER
Post CVA Discharge Follow Up    Hospitalization: 1/3/2022 - 1/11/2022  Rehabilitation: 1/11/2022 - 1/21/2022   The purpose of this phone call is to assess patient's general wellbeing or for any assistance needed with follow-up care  Called, reached patient and her daughter, I introduced myself and explained neurovascular nurse navigator role  Since discharge, they deny patient experiencing any new or worsening stroke-like symptoms  States she is slow but is balanced and doing well  She continues to be seen by in home ST and is doing brain games for cognitive strength  Ambulation / ADLs:  she is ambulating with a walker  Daughter manages her medications, appointments, and affairs  Appointments / Medication Review:  Reviewed appointments - patient successfully followed up with PCP  Patient scheduled with Katie Iglesias 2/14  Reports that patient, was indeed evaluated by in-home visiting nurses/aids, PT, OT and ST  I reviewed medications with daughter  Reports having no difficulties obtaining medications  Reports she is taking all as prescribed with no missed doses, medication side effects, or signs of bleeding  Risk Factors / Education:  During this call, we reviewed stroke type, symptoms, personal risk factors and management, medications, and resources  As for risk factors, reports they have been managing modifiable risk factors in the the following ways: BP is monitored at home, as well as blood glucose  Reported average BP continues to be 158/90, reported average blood glucose 145-150  she is a non smoker  Daughter assisting with meals and diet  Also she assists with encouraging patient to keep active  I addressed all her questions  At the conclusion of the conversation, patient and daughter deny having any further questions or concerns

## 2022-02-01 ENCOUNTER — TELEPHONE (OUTPATIENT)
Dept: FAMILY MEDICINE CLINIC | Facility: CLINIC | Age: 72
End: 2022-02-01

## 2022-02-01 NOTE — TELEPHONE ENCOUNTER
EVANGELISTA called asking for an order for a  for the patient in home  Patient has signs on depression  Please advise

## 2022-02-01 NOTE — PHYSICAL THERAPY NOTE
[facility] PT DC SUMMARY    Pt 66 yr old female presented to [facility] 1/3/22 s/p multiple falls at home  With extensive testing, pt was found to have a R buttock hematoma, scattered opacities in B lungs (pt with h/o stage 4 lung CA, not on O2- gets chemo every 3 weeks), +toxic metabolic encephalopathy with worsening confusion  +UTI, and MRI revealed +R BG/caudate infarct/ischemia- likely attributed afib/hypercoaguable state due to malignancy, ongoing urinary retention requiring bahena placement  Other PMH: afib, HTN, HLD, CHF, GERD, CKD3, DM2, B knee OA  At baseline 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, use of kelley and pt A with all ADLs  Pts cousin currently is staying with brother and daughter reports 25 9 S will be available at DC  Pt with slowed progress initially due to confusion and dec carryover, which improved once bahena DCd  Pt ultimately progressed to S with RW, with ongoing amb trials without AD for balance and endurance training  Pt is showing steady gait and safe with walker but will need S due to dec cognition  HEP reviewed  And pt  was provided with written hand out  Pt cleared for DC home with recommendation for 24 7 S, Home PT recommended to follow

## 2022-02-03 ENCOUNTER — TELEPHONE (OUTPATIENT)
Dept: OTHER | Facility: OTHER | Age: 72
End: 2022-02-03

## 2022-02-03 ENCOUNTER — OFFICE VISIT (OUTPATIENT)
Dept: FAMILY MEDICINE CLINIC | Facility: CLINIC | Age: 72
End: 2022-02-03
Payer: MEDICARE

## 2022-02-03 ENCOUNTER — TELEPHONE (OUTPATIENT)
Dept: NEUROLOGY | Facility: CLINIC | Age: 72
End: 2022-02-03

## 2022-02-03 VITALS
TEMPERATURE: 97.5 F | OXYGEN SATURATION: 100 % | WEIGHT: 177.4 LBS | HEIGHT: 64 IN | RESPIRATION RATE: 16 BRPM | SYSTOLIC BLOOD PRESSURE: 140 MMHG | BODY MASS INDEX: 30.29 KG/M2 | DIASTOLIC BLOOD PRESSURE: 80 MMHG | HEART RATE: 68 BPM

## 2022-02-03 DIAGNOSIS — R53.1 GENERALIZED WEAKNESS: ICD-10-CM

## 2022-02-03 DIAGNOSIS — E78.2 MIXED HYPERLIPIDEMIA: Primary | ICD-10-CM

## 2022-02-03 DIAGNOSIS — I10 PRIMARY HYPERTENSION: ICD-10-CM

## 2022-02-03 DIAGNOSIS — M62.81 GENERALIZED MUSCLE WEAKNESS: ICD-10-CM

## 2022-02-03 DIAGNOSIS — I63.9 ACUTE ISCHEMIC STROKE (HCC): ICD-10-CM

## 2022-02-03 DIAGNOSIS — R60.0 BILATERAL LEG EDEMA: ICD-10-CM

## 2022-02-03 DIAGNOSIS — M79.10 MYALGIA DUE TO STATIN: ICD-10-CM

## 2022-02-03 DIAGNOSIS — T46.6X5A MYALGIA DUE TO STATIN: ICD-10-CM

## 2022-02-03 PROCEDURE — 99214 OFFICE O/P EST MOD 30 MIN: CPT | Performed by: FAMILY MEDICINE

## 2022-02-03 RX ORDER — ATORVASTATIN CALCIUM 40 MG/1
40 TABLET, FILM COATED ORAL DAILY
Qty: 30 TABLET | Refills: 1 | Status: SHIPPED | OUTPATIENT
Start: 2022-02-03 | End: 2022-02-25

## 2022-02-03 RX ORDER — FUROSEMIDE 20 MG/1
20 TABLET ORAL DAILY PRN
Qty: 30 TABLET | Refills: 0 | Status: SHIPPED | OUTPATIENT
Start: 2022-02-03 | End: 2022-02-25

## 2022-02-03 NOTE — PROGRESS NOTES
Assessment/Plan:         Problem List Items Addressed This Visit        Cardiovascular and Mediastinum    Primary hypertension     Bps above goal and ranging in the 130's-160's/70-90's  Losartan increased to 50 mg 1 week ago and amlodipine held due to soft BPS inpatient   Will increase losartan to 75 mg daily and call in 1 week with readings  Also add lasix for 5 days given fluid overload  Expect some improvement with diuresing  If persistently elevated, increase to 100 mg and or restart amlodipine  Relevant Medications    furosemide (LASIX) 20 mg tablet    Acute ischemic stroke [facility])    Relevant Orders    Ambulatory Referral to [address] [place] [address] Isac Rios       Other    Hyperlipidemia - Primary    Relevant Medications    atorvastatin (LIPITOR) 40 mg tablet    Bilateral leg edema     With history of diastolic HF   Weight 079 today up from 165 on the day of discharge  Weight at office visit 1/27 was 180  Without orthopnea or SB   Avoid salt, elevate, and restart lasix 20 mg daily prn ( previously on 40 mg prn)   Follow up in 1 week         Relevant Medications    furosemide (LASIX) 20 mg tablet    potassium chloride (K-DUR,KLOR-CON) 10 mEq tablet    Generalized muscle weakness     Increased weakness in the past week s/p ischemic stroke 1/2022 with residual left sided weakness  Suspect myalgias and weakness is from statin therapy   Has a known history of statin intolerance   Lower Lipitor to 20 mg daily and restart PT         Relevant Orders    Ambulatory Referral to [address] [place] [address] Isac Rios      Other Visit Diagnoses     Myalgia due to statin        Relevant Medications    atorvastatin (LIPITOR) 40 mg tablet    Other Relevant Orders    Ambulatory Referral to Home Health    Generalized weakness                Subjective:      Patient ID: Di Lacey is a 66 y o  female with known stage 4 juan cancer undergoing chemotherapy every 3 weeks, A fib on Eliquis, HTN, HLD, CHF (grade 2 DD), GERD, CKD3, DM2, bilateral knee osteoarthritis, depression/anxiety, and ischemic stroke 1/2022 here with daughter with several concerns  Generalized Weakness: Discharged from cognitive rehab 1 week ago and was doing home PT  Shortly after, she was cleared from PT  Over the past few days, daughter noticed patient needing more assistance with transferring and getting up  As a result of the stroke, she has residual left sided weakness  Also reports pain chronic left knee pain  Notes weakness in the proximal muscles and soreness all over  Leg edema: Increased swelling in the lower extremities  Denies orthopnea, SOB, or cough  Was 165 when discharged 1/21, 180 on 1/27, and now 177  Was previously on lasix in the hospital and has a history of G1DD  BP: Home Bps 140-150's/80-90  Today, BP measured 160/99  Losartan was recently increased to 50 mg daily and amlodipine d/c in the hospital due to soft Bps  The following portions of the patient's history were reviewed and updated as appropriate:   She  has a past medical history of A-fib ([place] [address] ), Arthritis, Atrial fibrillation ([place] [address] ), Diabetes mellitus ([place] [address] ), Diabetes mellitus type 2, uncomplicated ([place] [address] ), Essential hypertension, Frozen shoulder, GERD (gastroesophageal reflux disease), cancer of uterus, Hyperlipidemia, Hypertension, Hyponatremia (11/16/2016), Lung mass, Malignant neoplasm without specification of site [facility]), Skin cancer, basal cell, and Stage 4 lung cancer ([place] [address] )    She   Patient Active Problem List    Diagnosis Date Noted    Bilateral leg edema 02/04/2022    Generalized muscle weakness 02/04/2022   [address] [facility] discharge follow-up 01/27/2022    Dyslipidemia 01/12/2022    Lethargy 13/61/0015    Diastolic congestive heart failure ([facility] ) 01/11/2022    GERD (gastroesophageal reflux disease) 01/11/2022    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[facility] ) 01/11/2022    Encephalopathy 01/06/2022    Confusion     Acute ischemic stroke ([facility] ) 01/05/2022    Hypomagnesemia 01/04/2022    Ground glass opacity present on imaging of lung 01/04/2022    Lung cancer ([address] ) 01/03/2022    Chronic kidney disease, stage 3 ([address] ) 01/03/2022    Hypertension 01/03/2022    Diabetes type 2, controlled ([address] ) 01/03/2022    Depression 01/03/2022    Hypokalemia 01/03/2022    Atrial fibrillation ([address] ) 01/03/2022    Former smoker 12/14/2021    Diarrhea 11/24/2021    Poor venous access 10/01/2021    Chemotherapy induced neutropenia ([address] ) 09/30/2021    Iron deficiency anemia 04/08/2021    Medicare annual wellness visit, subsequent 12/10/2020    Stage 3b chronic kidney disease ([address] ) 11/19/2020    Persistent proteinuria 11/19/2020    Hypercalcemia 11/19/2020    Palliative care patient 10/21/2020    Anemia in stage 3 chronic kidney disease ([facility] [address] ) 07/23/2020    Pneumonitis 02/24/2020    Localized swelling of left foot 02/17/2020    Caregiver stress 01/20/2020    CINV (chemotherapy-induced nausea and vomiting) 01/17/2020    Cyst of pancreas 01/17/2020    Other fatigue 10/08/2019    Secondary malignant neoplasm of bone and bone marrow ([address] ) 10/02/2019    Hypertensive heart disease with congestive heart failure ([facility] [address] ) 09/30/2019    Lung nodule, multiple 11/07/2018    Anxiety 06/13/2018    Adhesive capsulitis of shoulder 04/30/2018    Major depressive disorder with single episode, in full remission ([address] ) 08/17/2017    Atrial fibrillation ([facility] [address] ) 05/24/2017    Benign hypertension with CKD (chronic kidney disease) stage III ([address] ) 05/24/2017    Diabetes mellitus type 2, uncomplicated ([address] ) 98/95/6955    Malignant neoplasm of bone with metastases ([address] ) 05/24/2017    Cancer related pain 11/22/2016    Chronic diastolic heart failure ([address] ) 11/20/2016    Adenocarcinoma of lung, stage 4 ([address] ) 11/16/2016    Insomnia 10/20/2016    Skin rash 08/28/2016    Paroxysmal atrial fibrillation ([address] ) 08/27/2016    Primary hypertension 08/27/2016    BMI 33 0-33 9,adult 08/27/2016    Hyperlipidemia 08/03/2015    Vitamin D deficiency 08/03/2015    Acid reflux 06/04/2015    Osteoarthritis of knee 07/31/2008     She  has a past surgical history that includes Hysterectomy (2000); Total knee arthroplasty (Right, 09/23/2014); Cholecystectomy; TONSILECTOMY AND ADNOIDECTOMY; pr bronchoscopy needle bx trachea main stem&/bron (N/A, 11/17/2016); BRONCHOSCOPY (N/A, 11/17/2016); pr bronchoscopy,diagnostic (N/A, 5/24/2017); Appendectomy; Laryngoscopy; Gallbladder surgery; Mohs surgery; Colonoscopy; Joint replacement; Colonoscopy; Tonsillectomy; pr insj tunneled ctr vad w/subq port age 9 yr/> (N/A, 12/14/2021); and [place] guided central venous access device insertion (12/14/2021)  Her family history includes Arthritis in her father; Cancer in her family and maternal grandfather; Dementia in her mother; Depression in her family; Diabetes in her family and mother; Heart disease in her family, father, and mother; Hyperlipidemia in her family; Hypertension in her family, father, and mother; Lung cancer in her paternal uncle; Muscular dystrophy in her brother; Skin cancer in her father; Stroke in her family and father; Thyroid disease in her mother  She  reports that she has never smoked  She has never used smokeless tobacco  She reports that she does not drink alcohol and does not use drugs    Current Outpatient Medications on File Prior to Visit   Medication Sig    acetaminophen (TYLENOL) 325 mg tablet Take 650 mg by mouth every 6 (six) hours as needed for mild pain    acetaminophen (TYLENOL) 325 mg tablet Take 2 tablets (650 mg total) by mouth every 6 (six) hours as needed (mild pain)    apixaban (Eliquis) 5 mg TAKE 1 TABLET TWICE A DAY    apixaban (Eliquis) 5 mg Take 5 mg by mouth 2 (two) times a day    aspirin (ECOTRIN LOW STRENGTH) 81 mg EC tablet Take 1 tablet (81 mg total) by mouth daily    busPIRone (BUSPAR) 7 5 mg tablet TAKE 1 TABLET (7 5 MG TOTAL) BY MOUTH EVERY 12 (TWELVE) HOURS    busPIRone (BUSPAR) 7 5 mg tablet Take 7 5 mg by mouth 2 (two) times a day    Cholecalciferol (VITAMIN D) 2000 units CAPS Take by mouth    cholecalciferol (VITAMIN D3) 1,000 units tablet Take 2,000 Units by mouth daily    cyanocobalamin (VITAMIN B-12) 100 mcg tablet Take by mouth daily    cyanocobalamin (VITAMIN B-12) 100 MCG tablet Take 100 mcg by mouth daily    Dexamethasone 0 4 MG INST     folic acid (FOLVITE) 1 mg tablet Take 1 tablet (1 mg total) by mouth daily    folic acid (FOLVITE) 1 mg tablet Take 1 mg by mouth daily    linaGLIPtin (Tradjenta) 5 MG TABS Take 5 mg by mouth daily    loperamide (IMODIUM) 2 mg capsule Take 2 mg by mouth 4 (four) times a day as needed for diarrhea    loratadine (CLARITIN) 10 mg tablet Take 10 mg by mouth as needed      losartan (COZAAR) 50 mg tablet Take 1 tablet (50 mg total) by mouth daily    metFORMIN (GLUCOPHAGE) 1000 MG tablet Take 0 5 tablets (500 mg total) by mouth 2 (two) times a day with meals    metoprolol tartrate (LOPRESSOR) 25 mg tablet Take 1 tablet (25 mg total) by mouth every 12 (twelve) hours    omeprazole (PriLOSEC) 20 mg delayed release capsule Take 1 capsule (20 mg total) by mouth daily    sotalol (BETAPACE) 80 mg tablet Take 1 tablet (80 mg total) by mouth 2 (two) times a day 1 tab PO BID    venlafaxine (EFFEXOR) 37 5 mg tablet Take 1 tablet (37 5 mg total) by mouth daily     No current facility-administered medications on file prior to visit  She is allergic to amoxicillin, amoxicillin, cardizem [diltiazem], statins, and zofran [ondansetron]       Review of Systems   Respiratory: Negative for cough, chest tightness and shortness of breath  Cardiovascular: Positive for leg swelling  Neurological: Positive for weakness (ressidual left sided weakness with generalized weakness and pain in the proximal muscles )           Objective:      /80 (BP Location: Left arm, Patient Position: Sitting, Cuff Size: Standard)   Pulse 68   Temp 97 5 °F (36 4 °C)   Resp 16   Ht 5' 4" (1 626 m)   Wt 80 5 kg (177 lb 6 4 oz)   LMP  (LMP Unknown)   SpO2 100%   BMI 30 45 kg/m²          Physical Exam  Vitals reviewed  Constitutional:       General: She is not in acute distress  Appearance: She is not ill-appearing  Cardiovascular:      Rate and Rhythm: Normal rate  Pulmonary:      Breath sounds: Normal breath sounds  Musculoskeletal:      Right lower leg: Edema (1-2+ pitting edema up to the mid calf ) present  Left lower leg: Edema present  Comments: 3/5 on the left upper and lower extremity   4/5 on the right upper and lower   Able to rise out of the chair without assistance    Skin:     General: Skin is warm  Neurological:      Mental Status: She is alert

## 2022-02-03 NOTE — TELEPHONE ENCOUNTER
Carissa Romero called in to inform the office that pt's visit was canceled for today  Pt's daughter is concerned about BP getting higher and progressive weakness  Pt is having a hard time standing from a sitting position  Pt's BP before meds this morning was 155/91  One hour later her BP was 150/87

## 2022-02-04 ENCOUNTER — TELEPHONE (OUTPATIENT)
Dept: OTHER | Facility: OTHER | Age: 72
End: 2022-02-04

## 2022-02-04 ENCOUNTER — NURSE TRIAGE (OUTPATIENT)
Dept: OTHER | Facility: OTHER | Age: 72
End: 2022-02-04

## 2022-02-04 PROBLEM — R60.0 BILATERAL LEG EDEMA: Status: ACTIVE | Noted: 2022-02-04

## 2022-02-04 PROBLEM — M62.81 GENERALIZED MUSCLE WEAKNESS: Status: ACTIVE | Noted: 2022-02-04

## 2022-02-04 RX ORDER — POTASSIUM CHLORIDE 750 MG/1
10 TABLET, EXTENDED RELEASE ORAL DAILY
Qty: 30 TABLET | Refills: 1 | Status: SHIPPED | OUTPATIENT
Start: 2022-02-04 | End: 2022-02-14

## 2022-02-04 NOTE — ASSESSMENT & PLAN NOTE
Bps above goal and ranging in the 130's-160's/70-90's  Losartan increased to 50 mg 1 week ago and amlodipine held due to soft BPS inpatient   Will increase losartan to 75 mg daily and call in 1 week with readings  Also add lasix for 5 days given fluid overload  Expect some improvement with diuresing  If persistently elevated, increase to 100 mg and or restart amlodipine

## 2022-02-04 NOTE — ASSESSMENT & PLAN NOTE
Increased weakness in the past week s/p ischemic stroke 1/2022 with residual left sided weakness  Suspect myalgias and weakness is from statin therapy   Has a known history of statin intolerance   Lower Lipitor to 20 mg daily and restart PT

## 2022-02-04 NOTE — ASSESSMENT & PLAN NOTE
Lab Results   Component Value Date    HGBA1C 5 8 (H) 01/05/2022     Home - 150's with one reading in the 170's   Metformin 250 mg BID restarted ( renally dosed)   Apprehensive about increasing metformin due to CKD  Reassured patient that these readings are acceptable  Goal is less than 150's   Continue current regimen and call if persistently high

## 2022-02-04 NOTE — ASSESSMENT & PLAN NOTE
With history of diastolic HF   Weight 746 today up from 165 on the day of discharge  Weight at office visit 1/27 was 180     Without orthopnea or SB   Avoid salt, elevate, and restart lasix 20 mg daily prn ( previously on 40 mg prn)   Follow up in 1 week

## 2022-02-04 NOTE — TELEPHONE ENCOUNTER
Patients daughter Jigar Williamson is requesting a call back from the office  Daughter stated that her mother had office visit yesterday and during the visit it was discussed that her mother's prescription of losartan (COZAAR) 50 mg tablet would be bumped up to 75 mg, however on the after visit summary it stated that she would still be taking 50 mg  Daughter would like to discuss the change

## 2022-02-05 NOTE — TELEPHONE ENCOUNTER
Regarding: medication clarification  ----- Message from Sophia Olmedo sent at 2/4/2022  4:50 PM EST -----  Pt's mom called, requesting medication clarification, "  I called earlier, regarding my mom's medication, losartan, but my mom was who received the call and I am not sure if she understood the instructions  We visited her doctor yesterday, and the doctor mentioned that she was going to bump up her losartan from 50 mg tab, to 75 mg tab, but when I saw the office visit summary, I noticed that the dosage was not changed   I just want to now if she will keep the same losartan dosage to 50 mg, or was it changed to 75 mg "

## 2022-02-05 NOTE — TELEPHONE ENCOUNTER
Reason for Disposition   Caller has medicine question only, adult not sick, AND triager answers question    Answer Assessment - Initial Assessment Questions  1  NAME of MEDICATION: "What medicine are you calling about?"      Losartan 75 mg     2  QUESTION: "What is your question?" (e g , medication refill, side effect)      Are we supposed to increase the dosage? 3  PRESCRIBING HCP: "Who prescribed it?" Reason: if prescribed by specialist, call should be referred to that group  Dr Scar Leo     4   SYMPTOMS: "Do you have any symptoms?"      Higher blood pressure readings at times    Protocols used: MEDICATION QUESTION CALL-ADULT-

## 2022-02-07 ENCOUNTER — TELEPHONE (OUTPATIENT)
Dept: LAB | Facility: HOSPITAL | Age: 72
End: 2022-02-07

## 2022-02-08 ENCOUNTER — TELEPHONE (OUTPATIENT)
Dept: FAMILY MEDICINE CLINIC | Facility: CLINIC | Age: 72
End: 2022-02-08

## 2022-02-08 NOTE — TELEPHONE ENCOUNTER
[place] the [facility] nurse called to inform us that the patient has been discharged effective today from the [facility]

## 2022-02-10 ENCOUNTER — PATIENT OUTREACH (OUTPATIENT)
Dept: FAMILY MEDICINE CLINIC | Facility: CLINIC | Age: 72
End: 2022-02-10

## 2022-02-10 DIAGNOSIS — I10 PRIMARY HYPERTENSION: Primary | ICD-10-CM

## 2022-02-10 NOTE — PROGRESS NOTES
Thanks for letting me know  Let's add amlodipine 5mg daily to her regimen  I think she needs another agent on board      Thanks,  Lu Mayberry

## 2022-02-10 NOTE — TELEPHONE ENCOUNTER
[place] from [facility] called stating patient is on Losartan 100mg, her BP today was 186/96 which is a cause for concern  Please call her directly at [phone]

## 2022-02-10 NOTE — PROGRESS NOTES
Left message on patient's machine , rx request sent to you to send into [facility] thanks

## 2022-02-10 NOTE — PROGRESS NOTES
Left message for cardiology clinical coordinator with request for return call regarding elevated blood pressure

## 2022-02-10 NOTE — PROGRESS NOTES
Follow up call with Blayne Trejo, spoke with daughter in law Master Guess   She reports that her mother in law had some confusion yesterday but she was tired as well  They are monitoring her blood pressure  This morning's reading on home cuff was 186/96  She continues on Losartan 100mg daily  Her weight on home scale this morning was 179lbs  Reviewed parameters of weight gain to report to cardiologist, 3lbs/day or 5lbs/week  Also if she becomes short of breath or a worsening of lower extremity edema  She agreed to same  Next Cardiology appointment is 2/16/22 at 11:40am   All questions answered at this time

## 2022-02-10 NOTE — PROGRESS NOTES
Her last appointment with her pcp was 2/3/22 bp was 160/99 her losartan was increased to 100mg daily  This morning at home bp is still elevated at 186/96   Please advise

## 2022-02-11 ENCOUNTER — PATIENT OUTREACH (OUTPATIENT)
Dept: FAMILY MEDICINE CLINIC | Facility: CLINIC | Age: 72
End: 2022-02-11

## 2022-02-11 RX ORDER — AMLODIPINE BESYLATE 5 MG/1
5 TABLET ORAL DAILY
Qty: 30 TABLET | Refills: 11 | Status: SHIPPED | OUTPATIENT
Start: 2022-02-11 | End: 2022-02-11 | Stop reason: HOSPADM

## 2022-02-11 NOTE — PROGRESS NOTES
Spoke with daughter in law Carrie Beverly that is the family member staying with [facility] today  She called me to report that she looked at all her medications  It appears that they were using an old prescription for Losartan to fill her pill box  Patient was only getting Losartan 25mg daily  BP this am was 165/80  Weight today was 176lbs down three pounds since yesterday  Denies shortness of breath  Edema in ankles is improved  Advised to give her the original Losartan 50mg as prescribed upon AVS, OPCM will contact Cardiology for advice as to how to proceed  Question about increasing Losartan and if she should have Lasix daily or continue only as needed  She also reports they have only had her eat extra bananas on the days she takes Lasix  Family has ensured she is on a low sodium diet  OPCM will call back to Carrie Beverly  She was administering her meds to her now and will recheck her blood pressure in two hours  They did not get the message about Amlodipine from Cardiology 2/10/22 and have not picked up from pharmacy as of this call  Message left for Cardiology Coordinator

## 2022-02-11 NOTE — PROGRESS NOTES
Called patient's daughter in law Feli  She will start losartan 50mg daily today furosemide prn for weight gain, she will not start amlodipine  She will continue to monitor bp and daily weights call us if needed and follow up next week with Shagufta Raymond

## 2022-02-14 ENCOUNTER — OFFICE VISIT (OUTPATIENT)
Dept: HEMATOLOGY ONCOLOGY | Facility: CLINIC | Age: 72
End: 2022-02-14
Payer: MEDICARE

## 2022-02-14 ENCOUNTER — OFFICE VISIT (OUTPATIENT)
Dept: NEUROLOGY | Facility: CLINIC | Age: 72
End: 2022-02-14
Payer: MEDICARE

## 2022-02-14 VITALS
BODY MASS INDEX: 30.56 KG/M2 | DIASTOLIC BLOOD PRESSURE: 74 MMHG | HEIGHT: 64 IN | HEART RATE: 65 BPM | WEIGHT: 179 LBS | SYSTOLIC BLOOD PRESSURE: 179 MMHG

## 2022-02-14 DIAGNOSIS — C79.52 SECONDARY MALIGNANT NEOPLASM OF BONE AND BONE MARROW (HCC): ICD-10-CM

## 2022-02-14 DIAGNOSIS — E11.9 DIABETES TYPE 2, CONTROLLED (HCC): ICD-10-CM

## 2022-02-14 DIAGNOSIS — R41.89 COGNITIVE DECLINE: ICD-10-CM

## 2022-02-14 DIAGNOSIS — I10 HYPERTENSION: ICD-10-CM

## 2022-02-14 DIAGNOSIS — T45.1X5A CHEMOTHERAPY INDUCED NEUTROPENIA (HCC): ICD-10-CM

## 2022-02-14 DIAGNOSIS — C34.91 MALIGNANT NEOPLASM OF UNSPECIFIED PART OF RIGHT BRONCHUS OR LUNG (HCC): Primary | ICD-10-CM

## 2022-02-14 DIAGNOSIS — E83.52 HYPERCALCEMIA: ICD-10-CM

## 2022-02-14 DIAGNOSIS — Z86.73 HISTORY OF STROKE: Primary | ICD-10-CM

## 2022-02-14 DIAGNOSIS — E78.2 MIXED HYPERLIPIDEMIA: ICD-10-CM

## 2022-02-14 DIAGNOSIS — E78.5 DYSLIPIDEMIA: ICD-10-CM

## 2022-02-14 DIAGNOSIS — C41.9 MALIGNANT NEOPLASM OF BONE WITH METASTASES (HCC): ICD-10-CM

## 2022-02-14 DIAGNOSIS — F32.5 MAJOR DEPRESSIVE DISORDER WITH SINGLE EPISODE, IN FULL REMISSION (HCC): ICD-10-CM

## 2022-02-14 DIAGNOSIS — T45.1X5A CINV (CHEMOTHERAPY-INDUCED NAUSEA AND VOMITING): ICD-10-CM

## 2022-02-14 DIAGNOSIS — C34.12 MALIGNANT NEOPLASM OF UPPER LOBE OF LEFT LUNG (HCC): Primary | ICD-10-CM

## 2022-02-14 DIAGNOSIS — I48.91 ATRIAL FIBRILLATION (HCC): ICD-10-CM

## 2022-02-14 DIAGNOSIS — I10 PRIMARY HYPERTENSION: ICD-10-CM

## 2022-02-14 DIAGNOSIS — Z51.5 PALLIATIVE CARE PATIENT: ICD-10-CM

## 2022-02-14 DIAGNOSIS — C34.92 ADENOCARCINOMA OF LEFT LUNG, STAGE 4 (HCC): ICD-10-CM

## 2022-02-14 DIAGNOSIS — Z87.891 FORMER SMOKER: ICD-10-CM

## 2022-02-14 DIAGNOSIS — C34.12 MALIGNANT NEOPLASM OF UPPER LOBE OF LEFT LUNG (HCC): ICD-10-CM

## 2022-02-14 DIAGNOSIS — R11.2 CINV (CHEMOTHERAPY-INDUCED NAUSEA AND VOMITING): ICD-10-CM

## 2022-02-14 DIAGNOSIS — C79.51 SECONDARY MALIGNANT NEOPLASM OF BONE AND BONE MARROW (HCC): ICD-10-CM

## 2022-02-14 DIAGNOSIS — F32.A DEPRESSION: ICD-10-CM

## 2022-02-14 DIAGNOSIS — D70.1 CHEMOTHERAPY INDUCED NEUTROPENIA (HCC): ICD-10-CM

## 2022-02-14 PROCEDURE — 99215 OFFICE O/P EST HI 40 MIN: CPT | Performed by: INTERNAL MEDICINE

## 2022-02-14 PROCEDURE — 99215 OFFICE O/P EST HI 40 MIN: CPT | Performed by: PHYSICIAN ASSISTANT

## 2022-02-14 RX ORDER — BUSPIRONE HYDROCHLORIDE 7.5 MG/1
7.5 TABLET ORAL EVERY 12 HOURS SCHEDULED
Qty: 60 TABLET | Refills: 0 | Status: SHIPPED | OUTPATIENT
Start: 2022-02-14 | End: 2022-02-16 | Stop reason: ALTCHOICE

## 2022-02-14 NOTE — PROGRESS NOTES
Review of Systems   Constitutional: Negative  Negative for appetite change and fever  HENT: Negative  Negative for hearing loss, tinnitus, trouble swallowing and voice change  Eyes: Negative  Negative for photophobia and pain  Respiratory: Negative  Negative for shortness of breath  Cardiovascular: Negative  Negative for palpitations  Gastrointestinal: Positive for vomiting  Negative for nausea  Endocrine: Negative  Negative for cold intolerance  Genitourinary: Negative  Negative for dysuria, frequency and urgency  Musculoskeletal: Positive for gait problem  Negative for myalgias and neck pain  Skin: Negative  Negative for rash  Neurological: Positive for weakness (knees, arms,hip) and headaches  Negative for dizziness, tremors, seizures, syncope, facial asymmetry, speech difficulty, light-headedness and numbness  Hematological: Negative  Does not bruise/bleed easily  Psychiatric/Behavioral: Negative  Negative for confusion, hallucinations and sleep disturbance

## 2022-02-14 NOTE — TELEPHONE ENCOUNTER
Primary palliative medicine provider:   Chana Durbin     Medication requested:  Buspirone 7 5mg     If for pain, how has the patient been taking their pain medicine?      Last appointment: 22    Next scheduled appointment: TBS  Can front staff please schedule     PDMP review:  na

## 2022-02-14 NOTE — PATIENT INSTRUCTIONS
 Continue with combination of Eliquis 5mg twice a day, aspirin 81mg daily and atorvastatin 40mg daily for secondary stroke prevention   BP goal < 130/80  Continue to monitor blood pressure   LDL goal < 100   Hgb A1C goal <7 0   Defer to PCP regarding glycemic and blood pressure management   Continue physical, occupational and speech therapy  Counseling    I advised patient to avoid using NSAIDs for headaches or other pain and to stick to tylenol if needed   Recommend mediterranean diet & regular exercise regimen atleast 4-5 times a week for 20-30 minutes   Educated patient/family regarding medication compliance    Recommend follow up 6 months  I would be happy to see the patient sooner if any new questions/concerns arise  Patient/Guardian was advised to the call the office if they have any questions and concerns in the meantime  Patient/Guardian does understand that if they have any new stroke like symptoms such as facial droop on one side, weakness/paralysis on either side, speech trouble, numbness on one side, balance issues, any vision changes, extreme dizziness or any new headache, to call 9-1-1 immediately or to proceed to the nearest ER immediately

## 2022-02-14 NOTE — PROGRESS NOTES
Hematology Outpatient Follow - Up Note  Amber Griffith 66 y o  female MRN: @ Encounter: [id number]        Date:  2/14/2022        Assessment/ Plan:    Stage 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 of the lung with left scapula involvement diagnosed on 08/2016 PDL expression more than 50%, negative for EGFR mutation, ALK  rearrangement, Ros1 mutation     Treated initially with Pembrolizumab complicated with pneumonitis and later on nivolumab with prednisone 10 mg p o  Daily with excellent response      2   Disease progression in August 2018 in the left scapula with pain no new lesions by PET scan   Status post radiation therapy to the left scapula and treated with Alimta 500 milligram/meter squared, carboplatin AUC 5     After 3 cycles,  CT scan in January 2019 showed stable disease, and she was placed on maintenance Alimta 500 milligram/meter squared every 3 weeks         Alimta dose was reduced to 400 milligram/meter IV due to elevated Creatinine and  then Pemetrexed dose was reduced to 300 milligram/meter squared every 4 weeks    Cycle #38  Alimta was 6/22/21          3   Progression of disease 7/2021        Nivolumab 240 mg flat dose every 3 weeks and  carboplatin AUC 5 added to  pemetrexed which was dose reduced to 250 milligram/meter squared, initiated 8/2/2021          6   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%   (MAP2K1 mutations are mutually exclusive with BRAF mutations)  There are FDA approved therapies indicated for other disease states such as binimetinib, cobimetinib, selumetinib, trametinib   Given the very small (0 1%)  DNA amplification, use of these medications off label are likely to offer minimal benefit           7   CKD   She has established care with nephrology  Milton Score is on tighter BP management      8   Atrial fibrillation   On eliquis   Continue            9   Progression of Disease on CT 9/23/21 as evidenced by further increase in extensive left upper/lower lobe airspace consolidation with somewhat masslike configuration, initiated on Taxotere Cyramza on 10/08/2021, she received 3 cycles with Neulasta growth factor support to prevent chemotherapy-induced neutropenia      CT chest 11/29/21 after 3 cycles showed overall stable disease   Development of left moderate pleural effusion   Patient asymptomatic, clinically very good breath sounds         CT 1/20221 showed favorable response        Right basal ganglia and right caudate ischemic CVA 1/2021 S/P hospitalization rehabilitation     ASA 81 mg po added to  Eliquis 5mg BID    After discussion the patient decided to proceed with subsequent line of therapy    We believe Navelbine 25 milligram/meter every other week is good idea    Side effects such as alopecia, neuropathy, constipation, nausea, vomiting, skin rash etc   Told she agreed to proceed    Will obtain a baseline CT scan of the chest abdomen and pelvis    After 2 months will do another CT scan    Another option was palliative/ hospice care however at this time the patient declined this option            Labs and imaging studies are reviewed by ordering provider once results are available  If there are findings that need immediate attention, you will be contacted when results available  Discussing results and the implication on your healthcare is best discussed in person at your follow-up visit  HPI:    Ruma Hernandes was admitted to the hospital with arrhythmia and was found to have right lower lobe infiltrate in August 2016   She was treated with antibiotics however repeat chest x-ray showed persistent right lower lobe infiltrate   Subsequently the patient had a CT scan of the chest which showed a right perihilar mass, subcarinal lymphadenopathy, lytic lesion of the right costovertebral junction at T10 level   PET scan October 2016 showed a right perihilar mass measuring 3 5 cm with SUV of 8 9, subcarinal lymph nodes measuring 3 4 x 2 2 cm with SUV of 9 2  Nodule in the left upper lobe lung measured 2 x 1 1 cm   There was a lytic lesion involving the right 10th costovertebral junction with SUV of 14  4      Biopsy showed non-small cell carcinoma with features suggesting of adenocarcinoma positive for CK 7, CK 19, CA-19-9, ANEUDY-3, partially positive for P40, p63, negative for TTF-1   She had a history of uterine cancer in 2000 status post hysterectomy and did not require radiation or chemotherapy   She is status post bilateral oophorectomy, right knee replacement,   tonsillectomy       She used to smoke for 35 years 1 pack per day however quit smoking 21 years ago   She used hormonal replacement therapy for 3 years  Mey Elizondo has a family history significant for skin cancer in her father and coronary artery disease in mother  Mey Elizondo has 2 healthy children      Treated initially with Pembrolizumab December 2016, Finished in May 2017 secondary to grade 3 pneumonitis  Initiated on prednisone     Progression: Nivolumab 240 mg flat dose every 2 weeks along with prednisone 10 mg p o  Daily initiated April 2018- October 2018 with excellent response      Liquid biopsy showed K-MADHURI mutation G12C, no evidence of MSI high        Disease progression in August 2018 in the left scapula with pain no new lesions by PET scan   Status post radiation therapy to the left scapula and treated with Alimta 500 milligram/meter squared, carboplatin AUC 5   After 3 cycles,  CT scan in January 2019 showed stable disease  Carbo discontinued 3/2019    Maintenance Alimta 500 milligram/meter squared every 3 weeks initiated 3/2019        Cr 2/20 was 1 5   Plan was to dose reduce Alimta to 400mg/m2; however cr 2 16 2/24/20 and Alimta was held       3/4/20:  renal u/s  Minimal fullness of the left renal collecting system without matthieu hydronephrosis     Chronic right kidney lower pole cortical scar, with adjacent parenchymal calcification measuring 5 mm       She was on prednisone 5 mg p o  daily because of previous history of pneumonitis  CT scan chest 1/3/2020 showed no evidence of infiltration in the lung parenchyma, prednisone was reduced every other day for 1 month and then discontinued        CT scan 4/2020 showed stable disease in the left upper lobe of the lung     CT scan on 07/2020 showed stable disease in the left upper lobe of the lung, pancreatic cyst 2 3 cm, stable from the previous CT scan however bigger from last year CT scan       Chronic LLE edema since fall she had 2/2020    Venous doppler negative for clot      CT scan in February 2021 showed stable left upper lobe lung nodule however with progressive chronic kidney disease we decided to hold pemetrexed, status post SBRT to the left upper lobe lung nodule     CT scan of the chest in June 2021 showed increase in the size of the right lower lobe lung mass may be progression of disease versus inflammation/ infection     PET scan on 07/14/2021 showed large consolidation in the left lobe might be metastatic disease versus inflammation, uptake in enlarging right lower lobe lung lesion suspicious for malignancy, no evidence of disease in the neck abdomen or pelvis, mild disease in the left scapula      Nivolumab 240 mg flat dose every 3 weeks, pemetrexed 250 milligram/meter squared, carboplatin AUC 5 every 3 weeks Initiated 8/2/2021 8/23/21 Evaluated patient in infusion center  Erythematous rash to forehead, circumferentially around neck R > L, and on right hand near IV site  Rash blanches and is not raised, well circumscribed borders  Presumed secondary to Opdivo vs alimta  She did receive her 2nd cycle of treatment        Patient with worsening renal function and hypercalcemia    Alimta held and Xgeva given 9/24/21  Vitamin-D level as well as iPTH and parathyroid hormone related protein requested but not drawn     Repeat BMP requested - not drawn        Progression of Disease on CT 9/23/21    Further increase in extensive left upper/lower lobe airspace consolidation with somewhat masslike configuration   Nodular region right diaphragmatic leaflet  measuring up to 2 9 x 1 6 cm   3 0 x 2 4 previously initiated on Ramucirumab 10 milligram/kilogram, Taxotere 75 milligram/meter squared every 3 weeks on 10/15/2021      C diff colitis 10/19/21 treated with metronidazole successful no evidence of neutropenia     noncontrast (per patient request)Brain MRI 11/1/21 prior to initiation of Christine Jordan did not identify metastases              Admitted 1/3 - 1/21/22 regarding recurrent falls and altered mental status  Negative CT head however brain  MRI ultimately revealed a acute right basal ganglia ischemic CVA  MRA/MRV negative for occlusive disease   MRI brain with contrast negative for any enhancing lesions   Patient seen by Neurology and determined etiology of CVA small vessel disease  Aspirin was added to her secondary CVA ppx     She was treated for UTI        Transitioned to [facility] rehabilitation 2nd to decrease from her baseline in mobility, cognition and self care        Still not back to baseline  Interval History:        Previous Treatment:         Test Results:    Imaging: No results found      Labs:   Lab Results   Component Value Date    WBC 5 00 01/20/2022    HGB 9 7 (L) 01/20/2022    HCT 30 9 (L) 01/20/2022    MCV 89 01/20/2022     01/20/2022     Lab Results   Component Value Date     11/23/2015    K 4 3 01/20/2022     01/20/2022    CO2 29 01/20/2022    ANIONGAP 9 11/23/2015    BUN 29 (H) 01/20/2022    CREATININE 1 53 (H) 01/20/2022    GLUCOSE 98 01/03/2022    GLUF 119 (H) 01/20/2022    CALCIUM 8 6 01/20/2022    CORRECTEDCA 10 0 01/07/2022    AST 28 01/07/2022    ALT 23 01/07/2022    ALKPHOS 97 01/07/2022    PROT 6 8 11/23/2015    BILITOT 0 65 11/23/2015    EGFR 33 01/20/2022       Lab Results   Component Value Date    IRON 40 (L) 04/01/2021    TIBC 302 04/01/2021    FERRITIN 53 04/01/2021       No results found for: Claire Hansen      ROS: Review of Systems   Constitutional: Negative for appetite change, chills, diaphoresis, fatigue and unexpected weight change  HENT:   Negative for mouth sores, nosebleeds, sore throat, trouble swallowing and voice change  Eyes: Negative for eye problems and icterus  Respiratory: Negative for chest tightness, cough, hemoptysis and wheezing  Cardiovascular: Positive for leg swelling  Negative for chest pain and palpitations  Gastrointestinal: Negative for abdominal distention, abdominal pain, blood in stool, constipation, diarrhea, nausea and vomiting  Endocrine: Negative for hot flashes  Genitourinary: Negative for bladder incontinence, difficulty urinating, dyspareunia, dysuria and frequency  Musculoskeletal: Positive for gait problem  Negative for arthralgias, back pain, neck pain and neck stiffness  Skin: Negative for itching and rash  Neurological: Positive for gait problem  Negative for dizziness, headaches, numbness, seizures and speech difficulty  Hematological: Negative for adenopathy  Does not bruise/bleed easily  Psychiatric/Behavioral: Negative for decreased concentration, depression, sleep disturbance and suicidal ideas  The patient is not nervous/anxious  Current Medications: Reviewed  Allergies: Reviewed  PMH/FH/SH:  Reviewed      Physical Exam:    There is no height or weight on file to calculate BSA  Wt Readings from Last 3 Encounters:   02/03/22 80 5 kg (177 lb 6 4 oz)   01/27/22 81 9 kg (180 lb 9 6 oz)   01/24/22 79 6 kg (175 lb 8 oz)        Temp Readings from Last 3 Encounters:   02/03/22 97 5 °F (36 4 °C)   01/27/22 (!) 97 3 °F (36 3 °C) (Tympanic)   01/24/22 97 5 °F (36 4 °C) (Tympanic)        BP Readings from Last 3 Encounters:   02/03/22 140/80   01/27/22 130/92   01/24/22 140/70         Pulse Readings from Last 3 Encounters:   02/03/22 68   01/27/22 71   01/24/22 61        Physical Exam  Vitals reviewed     Constitutional: General: She is not in acute distress  Appearance: She is well-developed and normal weight  She is not diaphoretic  HENT:      Head: Normocephalic and atraumatic  Eyes:      Conjunctiva/sclera: Conjunctivae normal    Neck:      Trachea: No tracheal deviation  Cardiovascular:      Rate and Rhythm: Normal rate and regular rhythm  Heart sounds: No murmur heard  No friction rub  No gallop  Pulmonary:      Effort: Pulmonary effort is normal  No respiratory distress  Breath sounds: Normal breath sounds  No wheezing or rales  Chest:      Chest wall: No tenderness  Abdominal:      General: There is no distension  Palpations: Abdomen is soft  Tenderness: There is no abdominal tenderness  Musculoskeletal:      Cervical back: Normal range of motion and neck supple  Right lower le+ Pitting Edema present  Left lower le+ Pitting Edema present  Lymphadenopathy:      Cervical: No cervical adenopathy  Skin:     General: Skin is warm and dry  Coloration: Skin is not pale  Findings: No erythema  Neurological:      Mental Status: She is alert and oriented to person, place, and time  Motor: Weakness present  Psychiatric:         Behavior: Behavior normal          Thought Content: Thought content normal          Judgment: Judgment normal          ECO  Goals and Barriers:  Current Goal: Minimize effects of disease  Barriers: None  Patient's Capacity to Self Care:  Patient is able to self care      Code Status: [unfilled]

## 2022-02-14 NOTE — ASSESSMENT & PLAN NOTE
Lab Results   Component Value Date    HGBA1C 5 8 (H) 01/05/2022     · Will check 1-2 times a day  · Average fasting glucose 120's

## 2022-02-14 NOTE — ASSESSMENT & PLAN NOTE
MRI brain without contrast with right basal and right caudate acute ischemia  MRI with contrast did not demonstrate any enhancing lesions

## 2022-02-14 NOTE — ASSESSMENT & PLAN NOTE
· MOCA today in office 15/30     · Improved compared to [facility] [place] [facility] completed in January (8/30)

## 2022-02-14 NOTE — PROGRESS NOTES
[facility]  PATIENT:  Vanna Connors  MRN:  [id number]  :  1950  DATE OF SERVICE:  2022      Assessment/Plan:        Problem List Items Addressed This Visit        Endocrine    Diabetes type 2, controlled ([facility] [address] )       Lab Results   Component Value Date    HGBA1C 5 8 (H) 2022     · Will check 1-2 times a day  · Average fasting glucose 120's            Respiratory    Lung cancer ([facility] [address] )    ·  · Follows with Dr Adore Lopez outpatient  · Diagnosed with stage IV adenocarcinoma in 2016          Cardiovascular and Mediastinum    Primary hypertension     · Checking twice a day  · This morning 160/84  Today in the office 179/74  · Goal <130/80  · Management deferred to primary care             Atrial fibrillation ([facility] [address] )     · Does admit to having dizziness and fluttering sensation  · On Eliquis 5mg twice a day            Other    Hyperlipidemia    Former smoker    Depression     · Started on Effexor 37 5mg and Buspar 7 5mg  · Management per palliative care         History of stroke - Primary    ·  · MRI brain without contrast with right basal and right caudate acute ischemia  · MRI with contrast did not demonstrate any enhancing lesions  · Etiology felt to be small vessel disease  · Currently on Eliquis 5mg twice a day, aspirin 81mg daily and atorvastatin for secondary stroke prevention  · Continue physical, occupational and speech therapy as scheduled  · Avoid NSAIDS  · Discussed low salt and mediterranean diet       Dyslipidemia     · Continue atorvastatin 40mg daily          Cognitive decline     · MOCA today in office 15/30  · Improved compared to [facility] completed in January ()                  History of Present Illness:   Vanna Connors is a 66 y o  female PMH of afib on Eliquis, advanced stage non small lung CA getting chemotherapy, history of uterine CA, CKD, who presents for follow up in regard to her recent stroke       Lucia Grimes presented to [address] [place] [address] initially on 2022 as a trauma level B alert after multiple falls at home  Initial imaging showed postraumatic right buttock hematoma, no additional injuries  Neurology was asked to evaluate patient due to alter mental status and frequent headaches  She underwent an MRI brain without contrast on 01/04/2022 that demonstarted right basal ganglia and right caudate acute ischemia  Due to her cancer history MRI with contrast obtained to rule out metastasis  Due to fluctuating mental status, routine EEG obtained which showed background activities and posteriorly dominant rhythm are mildly too slow suggesting mild diffuse cerebral dysfunction  Etiology for stroke felt to be small vessel disease and aspirin 81mg daily added to her Eliquis 5mg twice a day  When she goes in afib she is symptomatic  She denies any recent dizziness, fluttering  Residual symptoms of cognitive, short term memory  Therapies: [facility] x 2 weeks followed by getting home PT, OT and ST x  2 days per week  She is currently using a walker  She denies any falls but had a few trips  Clemencia Chandler has been on Eliqius 5mg twice a day, aspirin 81mg and atorvastatin 40mg daily for secondary stroke prevention  She is compliant and tolerating the medication without significant side effects  She does have vaginal bleeding and bleeding in her gums  No reported new TIA/stroke like symptoms  Sleep:  No history of sleep apnea  Lately 9 hours uninterrupted sleep per night  She does snore but no gasping for air or pausing  Mood: On Effexor 37 5mg daily  No anxiety or depression  Headaches:   Improved  No recent headaches         Results:     Stroke risk factors were evaluated including:   Lab Results   Component Value Date/Time    CHOLESTEROL 210 (H) 01/05/2022 06:46 AM     Lab Results   Component Value Date/Time    TRIG 252 (H) 01/05/2022 06:46 AM    TRIG 147 11/23/2015 06:13 AM     Lab Results   Component Value Date/Time    HDL 41 (L) 01/05/2022 06:46 AM    HDL 44 11/23/2015 06:13 AM     Lab Results   Component Value Date/Time    LDLCALC 119 (H) 01/05/2022 06:46 AM    LDLCALC 156 (H) 11/23/2015 06:13 AM       Lab Results   Component Value Date/Time    HGBA1C 5 8 (H) 01/05/2022 06:44 AM    HGBA1C 7 2 (H) 11/23/2015 06:13 AM     Lab Results   Component Value Date/Time     01/05/2022 06:44 AM     11/23/2015 06:13 AM       Imaging:     Results for orders placed during the hospital encounter of 01/03/22    MRI brain wo contrast    Narrative  MRI BRAIN WITHOUT CONTRAST    INDICATION: change in mental status headache falls ? brain mets  COMPARISON:   None  TECHNIQUE:  Sagittal T1, axial T2, axial FLAIR, axial T1, axial Bowden and axial diffusion imaging  IMAGE QUALITY:  Diagnostic  FINDINGS:    BRAIN PARENCHYMA:  There is no discrete mass, mass effect or midline shift  There is no intracranial hemorrhage  Right basal ganglia and right caudate diffusion restriction with T2/FLAIR signal abnormality consistent with acute to subacute ischemia  Small scattered hyperintensities on T2/FLAIR imaging are noted in the periventricular and subcortical white matter demonstrating an appearance that is statistically most likely to represent mild microangiopathic change  VENTRICLES:  Normal for the patient's age  SELLA AND PITUITARY GLAND:  Normal     ORBITS:  Normal     PARANASAL SINUSES:  Normal     VASCULATURE:  Evaluation of the major intracranial vasculature demonstrates appropriate flow voids  CALVARIUM AND SKULL BASE:  Normal     EXTRACRANIAL SOFT TISSUES:  Normal     Impression  Right basal ganglia and right caudate acute ischemia  Findings were marked as "immediate"in Epic and will now be related to the ordering physician or covering clinical team by the radiology liaison  Workstation performed: [id number]    No results found for this or any previous visit  No results found for this or any previous visit      No results found for this or any previous visit  No results found for this or any previous visit  No results found for this or any previous visit        Past Medical History:     Past Medical History:   Diagnosis Date    A-fib ([place] [address] )     Arthritis     Atrial fibrillation ([place] [address] )     Diabetes mellitus ([place] [address] )     Diabetes mellitus type 2, uncomplicated ([place] [address] )     Last assessed: 8/17/17    Essential hypertension     Frozen shoulder     L shoulder    GERD (gastroesophageal reflux disease)     Hx of cancer of uterus     Last assessed: 8/21/15    Hyperlipidemia     Hypertension     Hyponatremia 11/16/2016    Lung mass     diagnosed 9/2016    Malignant neoplasm without specification of site ([place] [address] )     Skin cancer, basal cell     right eye area    Stage 4 lung cancer ([place] [address] )        Patient Active Problem List   Diagnosis    Paroxysmal atrial fibrillation ([place] [address] )    Primary hypertension    BMI 33 0-33 9,adult    Skin rash    Adenocarcinoma of lung, stage 4 (HCC)    Chronic diastolic heart failure (HCC)    Cancer related pain    Atrial fibrillation (HCC)    Benign hypertension with CKD (chronic kidney disease) stage III ([place] [address] )    Diabetes mellitus type 2, uncomplicated ([place] [address] )    Malignant neoplasm of bone with metastases ([place] [address] )    Acid reflux    Hyperlipidemia    Insomnia    Major depressive disorder with single episode, in full remission ([place] [address] )    Vitamin D deficiency    Adhesive capsulitis of shoulder    Osteoarthritis of knee    Anxiety    Lung nodule, multiple    Hypertensive heart disease with congestive heart failure ([place] [address] )    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)    Other fatigue    CINV (chemotherapy-induced nausea and vomiting)    Cyst of pancreas    Caregiver stress    Localized swelling of left foot    Pneumonitis    Anemia in stage 3 chronic kidney disease (HCC)    Palliative care patient    Stage 3b chronic kidney disease ([place] [address] )    Persistent proteinuria    Hypercalcemia    Medicare annual wellness visit, subsequent    Iron deficiency anemia    Chemotherapy induced neutropenia ([place] [address] )    Poor venous access    Diarrhea    Former smoker    Lung cancer ([place] [address] )    Chronic kidney disease, stage 3 ([place] [address] )    Hypertension    Diabetes type 2, controlled ([place] [address] )    Depression    Hypokalemia    Atrial fibrillation ([place] [address] )    Hypomagnesemia    Ground glass opacity present on imaging of lung    History of stroke    Encephalopathy    Confusion    Diastolic congestive heart failure (HCC)    GERD (gastroesophageal reflux disease)    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[place] [address] )    Dyslipidemia   [address] discharge follow-up    Bilateral leg edema    Generalized muscle weakness    Cognitive decline       Medications:      Current Outpatient Medications   Medication Sig Dispense Refill    acetaminophen (TYLENOL) 325 mg tablet Take 650 mg by mouth every 6 (six) hours as needed for mild pain      acetaminophen (TYLENOL) 325 mg tablet Take 2 tablets (650 mg total) by mouth every 6 (six) hours as needed (mild pain)  0    apixaban (Eliquis) 5 mg TAKE 1 TABLET TWICE A  tablet 3    apixaban (Eliquis) 5 mg Take 5 mg by mouth 2 (two) times a day      aspirin (ECOTRIN LOW STRENGTH) 81 mg EC tablet Take 1 tablet (81 mg total) by mouth daily 30 tablet 0    atorvastatin (LIPITOR) 40 mg tablet Take 1 tablet (40 mg total) by mouth daily 30 tablet 1    busPIRone (BUSPAR) 7 5 mg tablet TAKE 1 TABLET (7 5 MG TOTAL) BY MOUTH EVERY 12 (TWELVE) HOURS 60 tablet 0    busPIRone (BUSPAR) 7 5 mg tablet Take 7 5 mg by mouth 2 (two) times a day      Cholecalciferol (VITAMIN D) 2000 units CAPS Take by mouth      cyanocobalamin (VITAMIN B-12) 100 mcg tablet Take by mouth daily      cyanocobalamin (VITAMIN B-12) 100 MCG tablet Take 100 mcg by mouth daily      folic acid (FOLVITE) 1 mg tablet Take 1 tablet (1 mg total) by mouth daily 90 tablet 1    folic acid (FOLVITE) 1 mg tablet Take 1 mg by mouth daily      furosemide (LASIX) 20 mg tablet Take 1 tablet (20 mg total) by mouth daily as needed (leg edema) 30 tablet 0    linaGLIPtin (Tradjenta) 5 MG TABS Take 5 mg by mouth daily 90 tablet 3    loperamide (IMODIUM) 2 mg capsule Take 2 mg by mouth 4 (four) times a day as needed for diarrhea      loratadine (CLARITIN) 10 mg tablet Take 10 mg by mouth as needed        losartan (COZAAR) 50 mg tablet Take 1 tablet (50 mg total) by mouth daily 90 tablet 3    metFORMIN (GLUCOPHAGE) 1000 MG tablet Take 0 5 tablets (500 mg total) by mouth 2 (two) times a day with meals 180 tablet 3    metoprolol tartrate (LOPRESSOR) 25 mg tablet Take 1 tablet (25 mg total) by mouth every 12 (twelve) hours 180 tablet 3    omeprazole (PriLOSEC) 20 mg delayed release capsule Take 1 capsule (20 mg total) by mouth daily 90 capsule 3    sotalol (BETAPACE) 80 mg tablet Take 1 tablet (80 mg total) by mouth 2 (two) times a day 1 tab PO  tablet 3    venlafaxine (EFFEXOR) 37 5 mg tablet Take 1 tablet (37 5 mg total) by mouth daily 90 tablet 3     No current facility-administered medications for this visit  Allergies:       Allergies   Allergen Reactions    Amoxicillin Hives    Amoxicillin Rash and Hives    Cardizem [Diltiazem] Rash     Rash      Statins Myalgia     Severe muscle aching  Terrible pains    Zofran [Ondansetron] Palpitations       Family History:     Family History   Problem Relation Age of Onset    Heart disease Father         cardiac disorder    Hypertension Father     Arthritis Father     Stroke Father         cerebrovascular accident    Skin cancer Father     Diabetes Mother     Heart disease Mother    [place] Dementia Mother     Hypertension Mother     Thyroid disease Mother     Cancer Maternal Grandfather         of unknown origin    Cancer Family     Depression Family     Diabetes Family     Hyperlipidemia Family         essential    Heart disease Family     Hypertension Family     Stroke Family         syndrome    Lung cancer Paternal Uncle     Muscular dystrophy Brother        Social History:     Social History     Socioeconomic History    Marital status:      Spouse name: Not on file    Number of children: Not on file    Years of education: Not on file    Highest education level: Not on file   Occupational History    Occupation: retired   Tobacco Use    Smoking status: Never Smoker    Smokeless tobacco: Never Used    Tobacco comment: Quit in 2000   Vaping Use    Vaping Use: Never used   Substance and Sexual Activity    Alcohol use: No    Drug use: No    Sexual activity: Not Currently   Other Topics Concern    Not on file   Social History Narrative    ** Merged History Encounter **         Family dynamics: Supportive  Relationship status:    Children and Ages:1 adult dtr Marcelo Corona, 1 adult son Megan Jesus  Other important family information: Caregiver for her brother Tata Galarza, who is disabled  Living situation: Lives with brother        Employm    ent history/source of income: Worked as a  for [employer] prior to detention   Spirituality/ Alevism: Christianity    Patient's strengths, social supports, and resources: Supportive family  Hobbies/Interests: Sewing; Crafting  [employer]    e: States dtr Marcelo Corona is her POA       Social Determinants of Health     Financial Resource Strain: Not on file   Food Insecurity: Not on file   Transportation Needs: Not on file   Physical Activity: Not on file   Stress: Not on file   Social Connections: Not on file   Intimate Partner Violence: Not on file   Housing Stability: Not on file         ROS:     Review of Systems   Constitutional: Negative  Negative for appetite change and fever  HENT: Negative  Negative for hearing loss, tinnitus, trouble swallowing and voice change  Eyes: Negative  Negative for photophobia and pain  Respiratory: Negative  Negative for shortness of breath  Cardiovascular: Negative  Negative for palpitations  Gastrointestinal: Positive for vomiting  Negative for nausea  Endocrine: Negative  Negative for cold intolerance  Genitourinary: Negative  Negative for dysuria, frequency and urgency  Musculoskeletal: Positive for gait problem  Negative for myalgias and neck pain  Skin: Negative  Negative for rash  Neurological: Positive for weakness (knees, arms,hip) and headaches  Negative for dizziness, tremors, seizures, syncope, facial asymmetry, speech difficulty, light-headedness and numbness  Hematological: Negative  Does not bruise/bleed easily  Psychiatric/Behavioral: Negative  Negative for confusion, hallucinations and sleep disturbance       ROS reviewed personally and edited as needed  Objective:   Physical Exam:    BP (!) 179/74 (BP Location: Left arm, Patient Position: Sitting, Cuff Size: Standard)   Pulse 65   Ht 5' 4" (1 626 m)   Wt 81 2 kg (179 lb)   LMP  (LMP Unknown)   BMI 30 73 kg/m²     Physical Exam  Vitals reviewed  Eyes:      Extraocular Movements: EOM normal       Pupils: Pupils are equal, round, and reactive to light  Neurological:      Mental Status: She is alert and oriented to person, place, and time  Coordination: Finger-Nose-Finger Test normal       Deep Tendon Reflexes:      Reflex Scores:       Bicep reflexes are 1+ on the right side and 1+ on the left side  Brachioradialis reflexes are 1+ on the right side and 1+ on the left side  Patellar reflexes are 1+ on the right side and 1+ on the left side  Achilles reflexes are 1+ on the right side and 1+ on the left side  Psychiatric:         Speech: Speech normal        Neurologic Exam     Mental Status   Oriented to person, place, and time  Registration: recalls 3 of 3 objects  Follows 2 step commands  Attention: normal  Concentration: normal    Speech: speech is normal   Level of consciousness: alert  Knowledge: good  Normal comprehension       Cranial Nerves     CN III, IV, VI   Pupils are equal, round, and reactive to light  Extraocular motions are normal    Right pupil: Size: 3 mm  Shape: regular  Reactivity: brisk  Left pupil: Size: 3 mm  Shape: regular  Reactivity: brisk  CN III: no CN III palsy  CN VI: no CN VI palsy  Nystagmus: none   Diplopia: none  Ophthalmoparesis: none  Upgaze: normal  Downgaze: normal  Conjugate gaze: present    CN V   Facial sensation intact  CN VII   Facial expression full, symmetric  CN VIII   Hearing: intact    CN IX, X   Palate: symmetric    CN XI   Right trapezius strength: normal  Left trapezius strength: normal    CN XII   Tongue: not atrophic  Fasciculations: absent  Tongue deviation: none    Motor Exam   Right arm pronator drift: absent  Left arm pronator drift: present (downward drift)  Generalized 4/5 strength throughout with exception to left KF/KE: 3-4-/5     Sensory Exam   Light touch normal      Gait, Coordination, and Reflexes     Coordination   Finger to nose coordination: normal    Reflexes   Right brachioradialis: 1+  Left brachioradialis: 1+  Right biceps: 1+  Left biceps: 1+  Right patellar: 1+  Left patellar: 1+  Right achilles: 1+  Left achilles: 1+  Right ankle clonus: absent  Left ankle clonus: absent        I have spent 60 minutes with Patient  today in which greater than 50% of this time was spent in counseling/coordination of care regarding Diagnostic results, Prognosis, Risks and benefits of tx options, Intructions for management, Patient and family education, Importance of tx compliance, Risk factor reductions, Impressions and Plan of care as above

## 2022-02-14 NOTE — ASSESSMENT & PLAN NOTE
· Checking twice a day  · This morning 160/84   Today in the office 179/74  · Goal <130/80  · Management deferred to primary care

## 2022-02-15 NOTE — PROGRESS NOTES
Cardiology  Acute  Office Visit Note  Graciela Griffith   66 y o    female   MRN: [id number]  [address]  [address]  [address] [place] [id number] [phone]    PCP: Daniel Virk DO  Cardiologist: Dr Filippo Arauz            Summary of recommendations  DASH diet  Increase losartan to 50 mg q 12  Periodic home blood pressure monitoring  Follow up will be scheduled with Dr Filippo Arauz 3 months        Assessment/plan  Hypertension, essential   BP  162/74 -on metoprolol tartrate 25 mg q 12, losartan 50 mg daily, furosemide 20 mg daily p r n  weight gain  Also on sotalol 80 b i d   --> today increase losartan to 100 mg daily  -unable to tolerate lisinopril due to cough  -S/P allergic reaction to diltiazem  -In the past, did not start doxazosin as afraid of allergic reaction   -continue home blood pressure readings  Her monitor has been validated by Nephrology  Atrial fibrillation paroxysmal   Symptomatic when in AFib  She has been maintaining normal sinus rhythm  On anticoagulation with Eliquis 5 mg b i d , antiarrhythmic therapy with sotalol 80 mg b i d  Rate controlled metoprolol 25 mg q 12  Hyperlipidemia intolerant of statins and ezetimibe  1/5/22:  , non   Chronic left lower extremity edema since fall February 2020  CKD 3B,baseline creatinine 1 7 to 1 8 since December 2020, follows with Nephrology Dr Joe Dhillon  Stage 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 of the lung with left scapula involvement diagnosed 08/2016   -will be receiving chemotherapy-Navelbine 25 milligram/meter every other week   -prior:  Keytruda in 2016, and Alimta (Pemetrexed)   -Status post radiation therapy to the left scapula   History CVA January 2022:Right basal ganglia and right caudate ischemic CVA , secondary to small-vessel disease per Neurology  ASA 81/d added    On a statin  History uterine cancer in 2000 status post hysterectomy   Obesity, BMI 27  Former tobacco use, in remission  History 35 pack years, however quit smoking 21 years ago  Ambulatory dysfunction, using a walker  Cardiac testing   TTE  1/5/22 EF 60%  No RWMA  Grade 1 DD  RV cavity size normal   Systolic function normal   Mild AI                     HPI  Brianna Garcia is a 65 yo female paroxysmal atrial fibrillation, essential hypertension, dyslipidemia, CKD, stage IV non-small cell lung CA with metastasis  She has been chronically anticoagulated with Eliquis  She has been receiving chemotherapy for her lung CA and had prior radiation to her scapula she also has a history of uterine CA, and is status post hysterectomy, requiring no radiation, in 2000  She follows with cardiologist Dr Szymanski Post  She was last seen December 16, 2021  She has been unable to tolerate statins nor ezetimibe  She was feeling fatigued due to chemotherapy    Adm 1/11-1/22/22  Chief complaint: Recurrent falls, altered mental status  MRI of the brain:acute right basal ganglia ischemic CVA  Dx CVA, per neurology etiology small-vessel disease  Aspirin added   Discharged arc    2/16/22  Acute visit   She is accompanied by her son-in-law   CC: uncontrolled blood pressure  Recently, her losartan was increased from  mg daily per her PCP  However, this is not on her current list   She is taking 50 mg daily, along with metoprolol tartrate 25 mg q 12  She denies chest pain, shortness of breath, lightheadedness or dizziness  She has occasional lower extremity edema  In the last couple weeks she has not taken any Lasix  She tries to adhere to a salt restricted diet  I recommend periodic home blood pressure monitoring-- in the morning before she takes her medication,  and then several hours after  Will increase losartan to 50 mg q 12  If blood pressures are controlled we can go to a 100 mg pill    If her morning blood pressures are still high, we can continue 50 mg q 12 dosing  Follow-up Dr Szymanski Post 3 months        I have spent 25 minutes with Patient and family today in which greater than 50% of this time was spent in counseling/coordination of care regarding Intructions for management, Patient and family education, Importance of tx compliance and Risk factor reductions  Assessment  Diagnoses and all orders for this visit:    Primary hypertension    Chronic diastolic congestive heart failure (HCC)    Stage 3b chronic kidney disease ([place] [address] )    History of stroke    Mixed hyperlipidemia    Paroxysmal atrial fibrillation (HCC)    Malignant neoplasm of upper lobe of left lung (HCC)    BMI 33 0-33 9,adult          Past Medical History:   Diagnosis Date    A-fib ([place] [address] )     Arthritis     Atrial fibrillation ([place] [address] )     Diabetes mellitus ([place] [address] )     Diabetes mellitus type 2, uncomplicated ([place] [address] )     Last assessed: 8/17/17    Essential hypertension     Frozen shoulder     L shoulder    GERD (gastroesophageal reflux disease)     Hx of cancer of uterus     Last assessed: 8/21/15    Hyperlipidemia     Hypertension     Hyponatremia 11/16/2016    Lung mass     diagnosed 9/2016    Malignant neoplasm without specification of site ([place] [address] )     Skin cancer, basal cell     right eye area    Stage 4 lung cancer ([place] [address] )        Review of Systems   Constitutional: Negative for chills  Cardiovascular: Negative for chest pain, claudication, cyanosis, dyspnea on exertion, irregular heartbeat, leg swelling, near-syncope, orthopnea, palpitations, paroxysmal nocturnal dyspnea and syncope  Respiratory: Negative for cough and shortness of breath  Musculoskeletal:        Ambulatory dysfunction   Gastrointestinal: Negative for heartburn and nausea  Neurological: Negative for dizziness, focal weakness, headaches, light-headedness and weakness  All other systems reviewed and are negative        Allergies   Allergen Reactions    Amoxicillin Hives    Amoxicillin Rash and Hives    Cardizem [Diltiazem] Rash     Rash      Statins Myalgia     Severe muscle aching  Terrible pains    Zofran [Ondansetron] Palpitations           Current Outpatient Medications:     acetaminophen (TYLENOL) 325 mg tablet, Take 650 mg by mouth every 6 (six) hours as needed for mild pain, Disp: , Rfl:     acetaminophen (TYLENOL) 325 mg tablet, Take 2 tablets (650 mg total) by mouth every 6 (six) hours as needed (mild pain), Disp: , Rfl: 0    apixaban (Eliquis) 5 mg, TAKE 1 TABLET TWICE A DAY, Disp: 180 tablet, Rfl: 3    apixaban (Eliquis) 5 mg, Take 5 mg by mouth 2 (two) times a day, Disp: , Rfl:     aspirin (ECOTRIN LOW STRENGTH) 81 mg EC tablet, Take 1 tablet (81 mg total) by mouth daily, Disp: 30 tablet, Rfl: 0    atorvastatin (LIPITOR) 40 mg tablet, Take 1 tablet (40 mg total) by mouth daily, Disp: 30 tablet, Rfl: 1    busPIRone (BUSPAR) 7 5 mg tablet, Take 7 5 mg by mouth 2 (two) times a day, Disp: , Rfl:     busPIRone (BUSPAR) 7 5 mg tablet, Take 1 tablet (7 5 mg total) by mouth every 12 (twelve) hours, Disp: 60 tablet, Rfl: 0    Cholecalciferol (VITAMIN D) 2000 units CAPS, Take by mouth, Disp: , Rfl:     cyanocobalamin (VITAMIN B-12) 100 mcg tablet, Take by mouth daily, Disp: , Rfl:     cyanocobalamin (VITAMIN B-12) 100 MCG tablet, Take 100 mcg by mouth daily, Disp: , Rfl:     folic acid (FOLVITE) 1 mg tablet, Take 1 tablet (1 mg total) by mouth daily, Disp: 90 tablet, Rfl: 1    folic acid (FOLVITE) 1 mg tablet, Take 1 mg by mouth daily, Disp: , Rfl:     furosemide (LASIX) 20 mg tablet, Take 1 tablet (20 mg total) by mouth daily as needed (leg edema), Disp: 30 tablet, Rfl: 0    linaGLIPtin (Tradjenta) 5 MG TABS, Take 5 mg by mouth daily, Disp: 90 tablet, Rfl: 3    loperamide (IMODIUM) 2 mg capsule, Take 2 mg by mouth 4 (four) times a day as needed for diarrhea, Disp: , Rfl:     loratadine (CLARITIN) 10 mg tablet, Take 10 mg by mouth as needed  , Disp: , Rfl:     losartan (COZAAR) 50 mg tablet, Take 1 tablet (50 mg total) by mouth daily, Disp: 90 tablet, Rfl: 3    metFORMIN (GLUCOPHAGE) 1000 MG tablet, Take 0 5 tablets (500 mg total) by mouth 2 (two) times a day with meals, Disp: 180 tablet, Rfl: 3    metoprolol tartrate (LOPRESSOR) 25 mg tablet, Take 1 tablet (25 mg total) by mouth every 12 (twelve) hours, Disp: 180 tablet, Rfl: 3    omeprazole (PriLOSEC) 20 mg delayed release capsule, Take 1 capsule (20 mg total) by mouth daily, Disp: 90 capsule, Rfl: 3    sotalol (BETAPACE) 80 mg tablet, Take 1 tablet (80 mg total) by mouth 2 (two) times a day 1 tab PO BID, Disp: 180 tablet, Rfl: 3    venlafaxine (EFFEXOR) 37 5 mg tablet, Take 1 tablet (37 5 mg total) by mouth daily, Disp: 90 tablet, Rfl: 3        Social History     Socioeconomic History    Marital status:      Spouse name: Not on file    Number of children: Not on file    Years of education: Not on file    Highest education level: Not on file   Occupational History    Occupation: retired   Tobacco Use    Smoking status: Never Smoker    Smokeless tobacco: Never Used    Tobacco comment: Quit in 2000   Vaping Use    Vaping Use: Never used   Substance and Sexual Activity    Alcohol use: No    Drug use: No    Sexual activity: Not Currently   Other Topics Concern    Not on file   Social History Narrative    ** Merged History Encounter **         Family dynamics: Supportive  Relationship status:     Children and Ages:1 adult dtr Will Jolly, 1 adult son Lisset Garza  Other important family information: Caregiver for her brother Dennys Gonzalez, who is disabled  Living situation: Lives with brother        Employm    ent history/source of income: Worked as a  for [employer] prior to senior care   Spirituality/ Adventist: Hoahaoism    Patient's strengths, social supports, and resources: Supportive family  Hobbies/Interests: Sewing; Crafting  [employer]    e: States dtr Will Jolly is her POA       Social Determinants of Health     Financial Resource Strain: Not on [employer] Insecurity: Not on file   Transportation Needs: Not on file   Physical Activity: Not on file   Stress: Not on file   Social Connections: Not on file   Intimate Partner Violence: Not on file   Housing Stability: Not on file       Family History   Problem Relation Age of Onset    Heart disease Father         cardiac disorder    Hypertension Father     Arthritis Father     Stroke Father         cerebrovascular accident    Skin cancer Father     Diabetes Mother     Heart disease Mother     Dementia Mother     Hypertension Mother     Thyroid disease Mother     Cancer Maternal Grandfather         of unknown origin    Cancer Family     Depression Family     Diabetes Family     Hyperlipidemia Family         essential    Heart disease Family     Hypertension Family     Stroke Family         syndrome    Lung cancer Paternal Uncle     Muscular dystrophy Brother        Physical Exam  Vitals and nursing note reviewed  Constitutional:       General: She is not in acute distress  Appearance: She is not diaphoretic  HENT:      Head: Normocephalic and atraumatic  Eyes:      Conjunctiva/sclera: Conjunctivae normal    Cardiovascular:      Rate and Rhythm: Normal rate and regular rhythm  Pulses: Intact distal pulses  Heart sounds: Normal heart sounds  Pulmonary:      Effort: Pulmonary effort is normal       Breath sounds: Normal breath sounds  Abdominal:      General: Bowel sounds are normal       Palpations: Abdomen is soft  Musculoskeletal:         General: Normal range of motion  Cervical back: Normal range of motion and neck supple  Comments: Using a walker   Skin:     General: Skin is warm and dry  Neurological:      Mental Status: She is alert and oriented to person, place, and time  Vitals: not currently breastfeeding     Wt Readings from Last 3 Encounters:   02/14/22 81 2 kg (179 lb)   02/03/22 80 5 kg (177 lb 6 4 oz)   01/27/22 81 9 kg (180 lb 9 6 oz)         Labs & Results:  Lab Results   Component Value Date    WBC 5 00 01/20/2022    HGB 9 7 (L) 01/20/2022    HCT 30 9 (L) 01/20/2022    MCV 89 01/20/2022     01/20/2022     No results found for: BNP  No components found for: CHEM  Troponin I   Date Value Ref Range Status   03/28/2018 <0 02 <=0 04 ng/mL Final   11/22/2016 <0 02 <0 04 ng/mL Final   08/27/2016 <0 02 <0 04 ng/mL Final     No results found for this or any previous visit  No results found for this or any previous visit  This note was completed in part utilizing [employer] direct voice recognition software  Grammatical errors, random word insertion, spelling mistakes, and incomplete sentences may be an occasional consequence of the system secondary to software limitations, ambient noise and hardware issues  At the time of dictation, efforts were made to edit, clarify and /or correct errors  Please read the chart carefully and recognize, using context, where substitutions have occurred    If you have any questions or concerns about the context, text or information contained within the body of this dictation, please contact myself, the provider, for further clarification

## 2022-02-16 ENCOUNTER — TELEPHONE (OUTPATIENT)
Dept: NEPHROLOGY | Facility: CLINIC | Age: 72
End: 2022-02-16

## 2022-02-16 ENCOUNTER — OFFICE VISIT (OUTPATIENT)
Dept: CARDIOLOGY CLINIC | Facility: CLINIC | Age: 72
End: 2022-02-16
Payer: MEDICARE

## 2022-02-16 ENCOUNTER — APPOINTMENT (OUTPATIENT)
Dept: LAB | Facility: HOSPITAL | Age: 72
End: 2022-02-16
Payer: MEDICARE

## 2022-02-16 VITALS
DIASTOLIC BLOOD PRESSURE: 74 MMHG | WEIGHT: 174.4 LBS | OXYGEN SATURATION: 99 % | SYSTOLIC BLOOD PRESSURE: 162 MMHG | BODY MASS INDEX: 29.78 KG/M2 | HEART RATE: 65 BPM | HEIGHT: 64 IN

## 2022-02-16 DIAGNOSIS — I10 PRIMARY HYPERTENSION: Primary | ICD-10-CM

## 2022-02-16 DIAGNOSIS — E78.2 MIXED HYPERLIPIDEMIA: ICD-10-CM

## 2022-02-16 DIAGNOSIS — I48.91 ATRIAL FIBRILLATION, UNSPECIFIED TYPE (HCC): ICD-10-CM

## 2022-02-16 DIAGNOSIS — C34.92 ADENOCARCINOMA OF LEFT LUNG, STAGE 4 (HCC): ICD-10-CM

## 2022-02-16 DIAGNOSIS — D70.1 CHEMOTHERAPY INDUCED NEUTROPENIA (HCC): ICD-10-CM

## 2022-02-16 DIAGNOSIS — I48.0 PAROXYSMAL ATRIAL FIBRILLATION (HCC): ICD-10-CM

## 2022-02-16 DIAGNOSIS — N18.32 STAGE 3B CHRONIC KIDNEY DISEASE (HCC): ICD-10-CM

## 2022-02-16 DIAGNOSIS — I50.32 CHRONIC DIASTOLIC CONGESTIVE HEART FAILURE (HCC): ICD-10-CM

## 2022-02-16 DIAGNOSIS — R11.2 CINV (CHEMOTHERAPY-INDUCED NAUSEA AND VOMITING): ICD-10-CM

## 2022-02-16 DIAGNOSIS — D50.9 IRON DEFICIENCY ANEMIA, UNSPECIFIED IRON DEFICIENCY ANEMIA TYPE: ICD-10-CM

## 2022-02-16 DIAGNOSIS — I12.9 BENIGN HYPERTENSION WITH CKD (CHRONIC KIDNEY DISEASE) STAGE III (HCC): ICD-10-CM

## 2022-02-16 DIAGNOSIS — Z86.73 HISTORY OF STROKE: ICD-10-CM

## 2022-02-16 DIAGNOSIS — C41.9 MALIGNANT NEOPLASM OF BONE WITH METASTASES (HCC): ICD-10-CM

## 2022-02-16 DIAGNOSIS — T45.1X5A CHEMOTHERAPY INDUCED NEUTROPENIA (HCC): ICD-10-CM

## 2022-02-16 DIAGNOSIS — T45.1X5A CINV (CHEMOTHERAPY-INDUCED NAUSEA AND VOMITING): ICD-10-CM

## 2022-02-16 DIAGNOSIS — N18.30 BENIGN HYPERTENSION WITH CKD (CHRONIC KIDNEY DISEASE) STAGE III (HCC): ICD-10-CM

## 2022-02-16 DIAGNOSIS — C34.12 MALIGNANT NEOPLASM OF UPPER LOBE OF LEFT LUNG (HCC): ICD-10-CM

## 2022-02-16 DIAGNOSIS — E83.52 HYPERCALCEMIA: ICD-10-CM

## 2022-02-16 DIAGNOSIS — R80.1 PERSISTENT PROTEINURIA: ICD-10-CM

## 2022-02-16 PROBLEM — I50.30 DIASTOLIC CONGESTIVE HEART FAILURE (HCC): Status: RESOLVED | Noted: 2022-01-11 | Resolved: 2022-02-16

## 2022-02-16 PROBLEM — I11.0 HYPERTENSIVE HEART DISEASE WITH CONGESTIVE HEART FAILURE (HCC): Status: RESOLVED | Noted: 2019-09-30 | Resolved: 2022-02-16

## 2022-02-16 LAB
25(OH)D3 SERPL-MCNC: 91.3 NG/ML (ref 30–100)
ALBUMIN SERPL BCP-MCNC: 3.1 G/DL (ref 3.5–5)
ALP SERPL-CCNC: 142 U/L (ref 46–116)
ALT SERPL W P-5'-P-CCNC: 32 U/L (ref 12–78)
ANION GAP SERPL CALCULATED.3IONS-SCNC: 5 MMOL/L (ref 4–13)
AST SERPL W P-5'-P-CCNC: 29 U/L (ref 5–45)
BASOPHILS # BLD AUTO: 0.06 THOUSANDS/ΜL (ref 0–0.1)
BASOPHILS NFR BLD AUTO: 1 % (ref 0–1)
BILIRUB SERPL-MCNC: 0.65 MG/DL (ref 0.2–1)
BUN SERPL-MCNC: 35 MG/DL (ref 5–25)
CALCIUM ALBUM COR SERPL-MCNC: 10.2 MG/DL (ref 8.3–10.1)
CALCIUM SERPL-MCNC: 9.5 MG/DL (ref 8.3–10.1)
CHLORIDE SERPL-SCNC: 111 MMOL/L (ref 100–108)
CO2 SERPL-SCNC: 25 MMOL/L (ref 21–32)
CREAT SERPL-MCNC: 1.44 MG/DL (ref 0.6–1.3)
EOSINOPHIL # BLD AUTO: 0.18 THOUSAND/ΜL (ref 0–0.61)
EOSINOPHIL NFR BLD AUTO: 3 % (ref 0–6)
ERYTHROCYTE [DISTWIDTH] IN BLOOD BY AUTOMATED COUNT: 15 % (ref 11.6–15.1)
FERRITIN SERPL-MCNC: 49 NG/ML (ref 8–388)
GFR SERPL CREATININE-BSD FRML MDRD: 36 ML/MIN/1.73SQ M
GLUCOSE SERPL-MCNC: 114 MG/DL (ref 65–140)
HCT VFR BLD AUTO: 38.1 % (ref 34.8–46.1)
HGB BLD-MCNC: 11.5 G/DL (ref 11.5–15.4)
IMM GRANULOCYTES # BLD AUTO: 0.01 THOUSAND/UL (ref 0–0.2)
IMM GRANULOCYTES NFR BLD AUTO: 0 % (ref 0–2)
IRON SATN MFR SERPL: 25 % (ref 15–50)
IRON SERPL-MCNC: 79 UG/DL (ref 50–170)
LYMPHOCYTES # BLD AUTO: 0.83 THOUSANDS/ΜL (ref 0.6–4.47)
LYMPHOCYTES NFR BLD AUTO: 15 % (ref 14–44)
MAGNESIUM SERPL-MCNC: 1.7 MG/DL (ref 1.6–2.6)
MCH RBC QN AUTO: 28.5 PG (ref 26.8–34.3)
MCHC RBC AUTO-ENTMCNC: 30.2 G/DL (ref 31.4–37.4)
MCV RBC AUTO: 95 FL (ref 82–98)
MONOCYTES # BLD AUTO: 0.44 THOUSAND/ΜL (ref 0.17–1.22)
MONOCYTES NFR BLD AUTO: 8 % (ref 4–12)
NEUTROPHILS # BLD AUTO: 3.87 THOUSANDS/ΜL (ref 1.85–7.62)
NEUTS SEG NFR BLD AUTO: 73 % (ref 43–75)
NRBC BLD AUTO-RTO: 0 /100 WBCS
PHOSPHATE SERPL-MCNC: 3.2 MG/DL (ref 2.3–4.1)
PLATELET # BLD AUTO: 139 THOUSANDS/UL (ref 149–390)
PMV BLD AUTO: 12.2 FL (ref 8.9–12.7)
POTASSIUM SERPL-SCNC: 4.4 MMOL/L (ref 3.5–5.3)
PROT SERPL-MCNC: 6.3 G/DL (ref 6.4–8.2)
PTH-INTACT SERPL-MCNC: 45.7 PG/ML (ref 18.4–80.1)
RBC # BLD AUTO: 4.03 MILLION/UL (ref 3.81–5.12)
SODIUM SERPL-SCNC: 141 MMOL/L (ref 136–145)
TIBC SERPL-MCNC: 320 UG/DL (ref 250–450)
WBC # BLD AUTO: 5.39 THOUSAND/UL (ref 4.31–10.16)

## 2022-02-16 PROCEDURE — 83540 ASSAY OF IRON: CPT

## 2022-02-16 PROCEDURE — 36415 COLL VENOUS BLD VENIPUNCTURE: CPT

## 2022-02-16 PROCEDURE — 82306 VITAMIN D 25 HYDROXY: CPT

## 2022-02-16 PROCEDURE — 82728 ASSAY OF FERRITIN: CPT

## 2022-02-16 PROCEDURE — 83735 ASSAY OF MAGNESIUM: CPT

## 2022-02-16 PROCEDURE — 80053 COMPREHEN METABOLIC PANEL: CPT

## 2022-02-16 PROCEDURE — 83970 ASSAY OF PARATHORMONE: CPT

## 2022-02-16 PROCEDURE — 84100 ASSAY OF PHOSPHORUS: CPT

## 2022-02-16 PROCEDURE — 83550 IRON BINDING TEST: CPT

## 2022-02-16 PROCEDURE — 99214 OFFICE O/P EST MOD 30 MIN: CPT | Performed by: NURSE PRACTITIONER

## 2022-02-16 PROCEDURE — 85025 COMPLETE CBC W/AUTO DIFF WBC: CPT

## 2022-02-16 RX ORDER — LOSARTAN POTASSIUM 50 MG/1
50 TABLET ORAL EVERY 12 HOURS
Qty: 90 TABLET | Refills: 3
Start: 2022-02-16 | End: 2022-02-21

## 2022-02-16 NOTE — PATIENT INSTRUCTIONS
DASH Eating Plan   WHAT YOU NEED TO KNOW:   The DASH (Dietary Approaches to Stop Hypertension) Eating Plan is designed to help prevent or lower high blood pressure  It can also help to lower LDL (bad) cholesterol and decrease your risk for heart disease  The plan is low in sodium, sugar, unhealthy fats, and total fat  It is high in potassium, calcium, magnesium, and fiber  These nutrients are added when you eat more fruits, vegetables, and whole grains  With the DASH eating plan, you need to eat a certain number of servings from each food group  This will help you get enough of certain nutrients and limit others  The amount of servings you should eat depends on how many calories you need  Your dietitian can help you create meal plans with the right number of servings for each food group  DISCHARGE INSTRUCTIONS:   What you need to know about sodium:  Your dietitian will tell you how much sodium is safe for you to have each day  People with high blood pressure should have no more than 1,500 to 2,300 mg of sodium in a day  A teaspoon (tsp) of salt has 2,300 mg of sodium  This may seem like a difficult goal, but small changes to the foods you eat can make a big difference  Your healthcare provider or dietitian can help you create a meal plan that follows your sodium limit  · Read food labels  Food labels can help you choose foods that are low in sodium  The amount of sodium is listed in milligrams (mg)  The % Daily Value (DV) column tells you how much of your daily needs are met by 1 serving of the food for each nutrient listed  Choose foods that have less than 5% of the DV of sodium  These foods are considered low in sodium  Foods that have 20% or more of the DV of sodium are considered high in sodium  Avoid foods that have more than 300 mg of sodium in each serving  Choose foods that say low-sodium, reduced-sodium, or no salt added on the food label  · Limit added salt    Do not salt food at the table if you add salt when you cook  Use herbs and spices, such as onions, garlic, and salt-free seasonings to add flavor  Try lemon or lime juice or vinegar to add a tart flavor  Use hot peppers or a small amount of hot pepper sauce to add a spicy flavor  Limit foods high in added salt, such as the following:    ? Seasonings made with salt, such as garlic salt, celery salt, onion salt, seasoned salt, meat tenderizers, and monosodium glutamate (MSG)    ? Miso soup and canned or dried soup mixes    ? Regular soy sauce, barbecue sauce, teriyaki sauce, steak sauce, Worcestershire sauce, and most flavored vinegars    ? Snack foods, such as salted chips, popcorn, pretzels, pork rinds, salted crackers, and salted nuts    ? Frozen foods, such as dinners, entrees, vegetables with sauces, and breaded meats    · Ask about salt substitutes  Ask your healthcare provider if you may use salt substitutes  Some salt substitutes have ingredients that can be harmful if you have certain health conditions  · Choose foods carefully at restaurants  Meals from restaurants, especially fast food restaurants, are often high in sodium  Some restaurants have nutrition information that tells you the amount of sodium in their foods  Ask to have your food prepared with less, or no salt  What you need to know about fats:  Healthy fats include unsaturated fats and omega-3 fatty acids  Unhealthy fats include saturated fats and trans fats  · Include healthy fats, such as the following:      ? Cooking oils, such as soybean, canola, olive, or sunflower    ? Fatty fish, such as salmon, tuna, mackerel, or sardines    ? Flaxseed oil or ground flaxseed    ? ½ cup of cooked beans, such as black beans, kidney beans, or jones beans    ? 1½ ounces of low-sodium nuts, such as almonds or walnuts    ? Low-sugar, low-sodium peanut butter    ?  Seeds such as sadia seeds or sunflower seeds       · Limit or do not have unhealthy fats, such as the following: ? Foods that contain fat from animals, such as fatty meats, whole milk, butter, and cream    ? Shortening, stick margarine, palm oil, and coconut oil    ? Full-fat or creamy salad dressing    ? Creamy soup    ? Crackers, chips, and baked goods made with margarine or shortening    ? Foods that are fried in unhealthy fats    ? Gravy and sauces, such as Jerry or cheese sauces    What you need to know about carbohydrates (carbs): All carbs break down into sugar  Complex carbs contain more fiber than simple carbs  This means complex carbs go into the bloodstream more slowly and cause less of a blood sugar spike  Try to include more complex carbs and fewer simple carbs  · Include complex carbs, such as the following:      ? 1 slice of whole-grain bread    ? 1 ounce of dry cereal that does not contain added sugar    ? ½ cup of cooked oatmeal    ? 2 ounces of cooked whole-grain pasta    ? ½ cup of cooked brown rice    · Limit or do not have simple carbs, such as the following:      ? Baked goods, such as doughnuts, pastries, and cookies    ? Mixes for cornbread and biscuits    ? White rice and pasta mixes, such as boxed macaroni and cheese    ? Instant and cold cereals that contain sugar    ? Jelly, jam, and ice cream that contain sugar    ? Condiments such as ketchup    ? Drinks high in sugar, such as soft drinks, lemonade, and fruit juice    What you need to know about vegetables and fruits:  Vegetables and fruits can be fresh, frozen, or canned  If possible, try to choose low-sodium canned options  · Include a variety of vegetables and fruits, such as the following:      ? 1 medium apple, pear, or peach (about ½ cup chopped)    ? ½ small banana    ? ½ cup berries, such as blueberries, strawberries, or blackberries    ? 1 cup of raw leafy greens, such as lettuce, spinach, kale, or garbiela greens    ? ½ cup of frozen or canned (no added salt) vegetables, such as green beans    ?  ½ cup of fresh, frozen, or canned fruit (canned in light syrup or fruit juice)    ? ½ cup of vegetable or fruit juice    · Limit or do not have vegetables and fruits made in the following ways:      ? Frozen fruit such as cherries that have added sugar    ? Fruit in cream or butter sauce    ? Canned vegetables that are high in sodium    ? Sauerkraut, pickled vegetables, and other foods prepared in brine    ? Fried vegetables or vegetables in butter or high-fat sauces    What you need to know about protein foods:   · Include lean or low-fat protein foods, such as the following:      ? Poultry (chicken, turkey) with no skin    ? Fish (especially fatty fish, such as salmon, fresh tuna, or mackerel)    ? Lean beef and pork (loin, round, extra lean hamburger)    ? Egg whites and egg substitutes    ? 1 cup of nonfat (skim) or 1% milk    ? 1½ ounces of fat-free or low-fat cheese    ? 6 ounces of nonfat or low-fat yogurt    · Limit or do not have high-fat protein foods, such as the following:      ? Smoked or cured meat, such as corned beef, minor, ham, hot dogs, and sausage    ? Canned beans and canned meats or spreads, such as potted meats, sardines, anchovies, and imitation seafood    ? Deli or lunch meats, such as bologna, ham, turkey, and roast beef    ? High-fat meat (T-bone steak, regular hamburger, and ribs)    ? Whole eggs and egg yolks    ? Whole milk, 2% milk, and cream    ? Regular cheese and processed cheese    Other guidelines to follow:   · Maintain a healthy weight  Your risk for heart disease is higher if you are overweight  Your healthcare provider may suggest that you lose weight if you are overweight  You can lose weight by eating fewer calories and foods that have added sugars and fat  The DASH meal plan can help you do this  Decrease calories by eating smaller portions at each meal and fewer snacks  Ask your healthcare provider for more information about how to lose weight  · Exercise regularly    Regular exercise can help you reach or maintain a healthy weight  Regular exercise can also help decrease your blood pressure and improve your cholesterol levels  Get 30 minutes or more of moderate exercise each day of the week  To lose weight, get at least 60 minutes of exercise  Talk to your healthcare provider about the best exercise program for you  · Limit alcohol  Women should limit alcohol to 1 drink a day  Men should limit alcohol to 2 drinks a day  A drink of alcohol is 12 ounces of beer, 5 ounces of wine, or 1½ ounces of liquor  For more information:   · National [facility] and [address]  [address] [address]  [place] [address]  Phone: 1- 075 - 455-9859  Web Address: [facility] no    © [facility] 2021 Information is for End User's use only and may not be sold, redistributed or otherwise used for commercial purposes  All illustrations and images included in CareNotes® are the copyrighted property of [employer]  or [address] [place], [place] [address]shelly   The above information is an  only  It is not intended as medical advice for individual conditions or treatments  Talk to your doctor, nurse or pharmacist before following any medical regimen to see if it is safe and effective for you

## 2022-02-16 NOTE — LETTER
February 16, 2022     Farrah Vieira, [address] [facility]  [address] [facility] [address]  [address] [place] [address]    Patient: Di Lacey   YOB: 1950   Date of Visit: 2/16/2022       Dear Dr Heather Islas:    Thank you for referring Jorge L Slater to me for evaluation  Below are my notes for this consultation  If you have questions, please do not hesitate to call me  I look forward to following your patient along with you  Sincerely,        EDUARDO Goldberg        CC: MD Marizol Burr, [facility]  2/16/2022 12:27 PM  Sign when Signing Visit  Cardiology  Acute  Office Visit Note  Di Lacey   66 y o    female   MRN: [id number]  [address]  [address] [facility] [address] [facility] [address] [place] [address]  [phone]  [phone]    PCP: Farrah Vieira DO  Cardiologist: Dr Jasmyne Enriquez            Summary of recommendations  DASH diet  Increase losartan to 50 mg q 12  Periodic home blood pressure monitoring  Follow up will be scheduled with Dr Jasmyne Enriquez 3 months        Assessment/plan  Hypertension, essential   BP  162/74 -on metoprolol tartrate 25 mg q 12, losartan 50 mg daily, furosemide 20 mg daily p r n  weight gain  Also on sotalol 80 b i d   --> today increase losartan to 100 mg daily  -unable to tolerate lisinopril due to cough  -S/P allergic reaction to diltiazem  -In the past, did not start doxazosin as afraid of allergic reaction   -continue home blood pressure readings  Her monitor has been validated by Nephrology  Atrial fibrillation paroxysmal   Symptomatic when in AFib  She has been maintaining normal sinus rhythm  On anticoagulation with Eliquis 5 mg b i d , antiarrhythmic therapy with sotalol 80 mg b i d  Rate controlled metoprolol 25 mg q 12  Hyperlipidemia intolerant of statins and ezetimibe   1/5/22:  , non   Chronic left lower extremity edema since fall February 2020  CKD 3B,baseline creatinine 1 7 to 1 8 since December 2020, follows with Nephrology Dr Yulisa Mejía of the lung primary in the left upper lobe of the lung with left scapula involvement diagnosed 08/2016   -will be receiving chemotherapy-Navelbine 25 milligram/meter every other week   -prior:  Keytruda in 2016, and Alimta (Pemetrexed)   -Status post radiation therapy to the left scapula   History CVA January 2022:Right basal ganglia and right caudate ischemic CVA , secondary to small-vessel disease per Neurology  ASA 81/d added  On a statin  History uterine cancer in 2000 status post hysterectomy   Obesity, BMI 30  Former tobacco use, in remission  History 35 pack years, however quit smoking 21 years ago  Ambulatory dysfunction, using a walker  Cardiac testing   TTE  1/5/22 EF 60%  No RWMA  Grade 1 DD  RV cavity size normal   Systolic function normal   Mild AI                     HPI  Mireille Elam is a 65 yo female paroxysmal atrial fibrillation, essential hypertension, dyslipidemia, CKD, stage IV non-small cell lung CA with metastasis  She has been chronically anticoagulated with Eliquis  She has been receiving chemotherapy for her lung CA and had prior radiation to her scapula she also has a history of uterine CA, and is status post hysterectomy, requiring no radiation, in 2000  She follows with cardiologist Dr Twin Hollingsworth  She was last seen December 16, 2021  She has been unable to tolerate statins nor ezetimibe  She was feeling fatigued due to chemotherapy    Adm 1/11-1/22/22  Chief complaint: Recurrent falls, altered mental status  MRI of the brain:acute right basal ganglia ischemic CVA  Dx CVA, per neurology etiology small-vessel disease  Aspirin added   Discharged arc    2/16/22  Acute visit   She is accompanied by her son-in-law   CC: uncontrolled blood pressure  Recently, her losartan was increased from  mg daily per her PCP      However, this is not on her current list   She is taking 50 mg daily, along with metoprolol tartrate 25 mg q 12  She denies chest pain, shortness of breath, lightheadedness or dizziness  She has occasional lower extremity edema  In the last couple weeks she has not taken any Lasix  She tries to adhere to a salt restricted diet  I recommend periodic home blood pressure monitoring-- in the morning before she takes her medication,  and then several hours after  Will increase losartan to 50 mg q 12  If blood pressures are controlled we can go to a 100 mg pill  If her morning blood pressures are still high, we can continue 50 mg q 12 dosing  Follow-up Dr Twin Hollingsworth 3 months        I have spent 25 minutes with Patient and family today in which greater than 50% of this time was spent in counseling/coordination of care regarding Intructions for management, Patient and family education, Importance of tx compliance and Risk factor reductions  Assessment  Diagnoses and all orders for this visit:    Primary hypertension    Chronic diastolic congestive heart failure (HCC)    Stage 3b chronic kidney disease ([facility] )    History of stroke    Mixed hyperlipidemia    Paroxysmal atrial fibrillation (HCC)    Malignant neoplasm of upper lobe of left lung (HCC)    BMI 33 0-33 9,adult          Past Medical History:   Diagnosis Date    A-fib ([place] [address] )     Arthritis     Atrial fibrillation ([facility] )     Diabetes mellitus ([facility] )     Diabetes mellitus type 2, uncomplicated ([facility] )     Last assessed: 8/17/17    Essential hypertension     Frozen shoulder     L shoulder    GERD (gastroesophageal reflux disease)     Hx of cancer of uterus     Last assessed: 8/21/15    Hyperlipidemia     Hypertension     Hyponatremia 11/16/2016    Lung mass     diagnosed 9/2016    Malignant neoplasm without specification of site ([facility] )     Skin cancer, basal cell     right eye area    Stage 4 lung cancer ([facility] )        Review of Systems   Constitutional: Negative for chills     Cardiovascular: Negative for chest pain, claudication, cyanosis, dyspnea on exertion, irregular heartbeat, leg swelling, near-syncope, orthopnea, palpitations, paroxysmal nocturnal dyspnea and syncope  Respiratory: Negative for cough and shortness of breath  Musculoskeletal:        Ambulatory dysfunction   Gastrointestinal: Negative for heartburn and nausea  Neurological: Negative for dizziness, focal weakness, headaches, light-headedness and weakness  All other systems reviewed and are negative  Allergies   Allergen Reactions    Amoxicillin Hives    Amoxicillin Rash and Hives    Cardizem [Diltiazem] Rash     Rash      Statins Myalgia     Severe muscle aching  Terrible pains    Zofran [Ondansetron] Palpitations           Current Outpatient Medications:     acetaminophen (TYLENOL) 325 mg tablet, Take 650 mg by mouth every 6 (six) hours as needed for mild pain, Disp: , Rfl:     acetaminophen (TYLENOL) 325 mg tablet, Take 2 tablets (650 mg total) by mouth every 6 (six) hours as needed (mild pain), Disp: , Rfl: 0    apixaban (Eliquis) 5 mg, TAKE 1 TABLET TWICE A DAY, Disp: 180 tablet, Rfl: 3    apixaban (Eliquis) 5 mg, Take 5 mg by mouth 2 (two) times a day, Disp: , Rfl:     aspirin (ECOTRIN LOW STRENGTH) 81 mg EC tablet, Take 1 tablet (81 mg total) by mouth daily, Disp: 30 tablet, Rfl: 0    atorvastatin (LIPITOR) 40 mg tablet, Take 1 tablet (40 mg total) by mouth daily, Disp: 30 tablet, Rfl: 1    busPIRone (BUSPAR) 7 5 mg tablet, Take 7 5 mg by mouth 2 (two) times a day, Disp: , Rfl:     busPIRone (BUSPAR) 7 5 mg tablet, Take 1 tablet (7 5 mg total) by mouth every 12 (twelve) hours, Disp: 60 tablet, Rfl: 0    Cholecalciferol (VITAMIN D) 2000 units CAPS, Take by mouth, Disp: , Rfl:     cyanocobalamin (VITAMIN B-12) 100 mcg tablet, Take by mouth daily, Disp: , Rfl:     cyanocobalamin (VITAMIN B-12) 100 MCG tablet, Take 100 mcg by mouth daily, Disp: , Rfl:     folic acid (FOLVITE) 1 mg tablet, Take 1 tablet (1 mg total) by mouth daily, Disp: 90 tablet, Rfl: 1    folic acid (FOLVITE) 1 mg tablet, Take 1 mg by mouth daily, Disp: , Rfl:     furosemide (LASIX) 20 mg tablet, Take 1 tablet (20 mg total) by mouth daily as needed (leg edema), Disp: 30 tablet, Rfl: 0    linaGLIPtin (Tradjenta) 5 MG TABS, Take 5 mg by mouth daily, Disp: 90 tablet, Rfl: 3    loperamide (IMODIUM) 2 mg capsule, Take 2 mg by mouth 4 (four) times a day as needed for diarrhea, Disp: , Rfl:     loratadine (CLARITIN) 10 mg tablet, Take 10 mg by mouth as needed  , Disp: , Rfl:     losartan (COZAAR) 50 mg tablet, Take 1 tablet (50 mg total) by mouth daily, Disp: 90 tablet, Rfl: 3    metFORMIN (GLUCOPHAGE) 1000 MG tablet, Take 0 5 tablets (500 mg total) by mouth 2 (two) times a day with meals, Disp: 180 tablet, Rfl: 3    metoprolol tartrate (LOPRESSOR) 25 mg tablet, Take 1 tablet (25 mg total) by mouth every 12 (twelve) hours, Disp: 180 tablet, Rfl: 3    omeprazole (PriLOSEC) 20 mg delayed release capsule, Take 1 capsule (20 mg total) by mouth daily, Disp: 90 capsule, Rfl: 3    sotalol (BETAPACE) 80 mg tablet, Take 1 tablet (80 mg total) by mouth 2 (two) times a day 1 tab PO BID, Disp: 180 tablet, Rfl: 3    venlafaxine (EFFEXOR) 37 5 mg tablet, Take 1 tablet (37 5 mg total) by mouth daily, Disp: 90 tablet, Rfl: 3        Social History     Socioeconomic History    Marital status:      Spouse name: Not on file    Number of children: Not on file    Years of education: Not on file    Highest education level: Not on file   Occupational History    Occupation: retired   Tobacco Use    Smoking status: Never Smoker    Smokeless tobacco: Never Used    Tobacco comment: Quit in 2000   Vaping Use    Vaping Use: Never used   Substance and Sexual Activity    Alcohol use: No    Drug use: No    Sexual activity: Not Currently   Other Topics Concern    Not on file   Social History Narrative    ** Merged History Encounter **         Family dynamics: Supportive  Relationship status:     Children and Ages:1 adult dtr Bernie Nunez, 1 adult son Felipe Portillo  Other important family information: Caregiver for her brother Angy Joya, who is disabled  Living situation: Lives with brother        Employm    ent history/source of income: Worked as a  for [employer] prior to half-way   Spirituality/ Congregation: Hinduism    Patient's strengths, social supports, and resources: Supportive family  Hobbies/Interests: Sewing; Crafting  [employer]    e: States dtr Bernie Nunez is her POA       Social Determinants of Health     Financial Resource Strain: Not on file   Food Insecurity: Not on file   Transportation Needs: Not on file   Physical Activity: Not on file   Stress: Not on file   Social Connections: Not on file   Intimate Partner Violence: Not on file   Housing Stability: Not on file       Family History   Problem Relation Age of Onset    Heart disease Father         cardiac disorder    Hypertension Father     Arthritis Father     Stroke Father         cerebrovascular accident    Skin cancer Father     Diabetes Mother     Heart disease Mother     Dementia Mother     Hypertension Mother     Thyroid disease Mother     Cancer Maternal Grandfather         of unknown origin    Cancer Family     Depression Family     Diabetes Family     Hyperlipidemia Family         essential    Heart disease Family     Hypertension Family     Stroke Family         syndrome    Lung cancer Paternal Uncle     Muscular dystrophy Brother        Physical Exam  Vitals and nursing note reviewed  Constitutional:       General: She is not in acute distress  Appearance: She is not diaphoretic  HENT:      Head: Normocephalic and atraumatic  Eyes:      Conjunctiva/sclera: Conjunctivae normal    Cardiovascular:      Rate and Rhythm: Normal rate and regular rhythm  Pulses: Intact distal pulses  Heart sounds: Normal heart sounds  Pulmonary:      Effort: Pulmonary effort is normal       Breath sounds: Normal breath sounds  Abdominal:      General: Bowel sounds are normal       Palpations: Abdomen is soft  Musculoskeletal:         General: Normal range of motion  Cervical back: Normal range of motion and neck supple  Comments: Using a walker   Skin:     General: Skin is warm and dry  Neurological:      Mental Status: She is alert and oriented to person, place, and time  Vitals: not currently breastfeeding  Wt Readings from Last 3 Encounters:   02/14/22 81 2 kg (179 lb)   02/03/22 80 5 kg (177 lb 6 4 oz)   01/27/22 81 9 kg (180 lb 9 6 oz)         Labs & Results:  Lab Results   Component Value Date    WBC 5 00 01/20/2022    HGB 9 7 (L) 01/20/2022    HCT 30 9 (L) 01/20/2022    MCV 89 01/20/2022     01/20/2022     No results found for: BNP  No components found for: CHEM  Troponin I   Date Value Ref Range Status   03/28/2018 <0 02 <=0 04 ng/mL Final   11/22/2016 <0 02 <0 04 ng/mL Final   08/27/2016 <0 02 <0 04 ng/mL Final     No results found for this or any previous visit  No results found for this or any previous visit  This note was completed in part utilizing [employer] direct voice recognition software  Grammatical errors, random word insertion, spelling mistakes, and incomplete sentences may be an occasional consequence of the system secondary to software limitations, ambient noise and hardware issues  At the time of dictation, efforts were made to edit, clarify and /or correct errors  Please read the chart carefully and recognize, using context, where substitutions have occurred    If you have any questions or concerns about the context, text or information contained within the body of this dictation, please contact myself, the provider, for further clarification

## 2022-02-16 NOTE — TELEPHONE ENCOUNTER
Creatinine remains overall stable at baseline  Calcium level remains borderline elevated  Vitamin-D level seems to be normal and upper normal range 91  Would like her to discontinue vitamin-D supplements for time being  Avoid any calcium supplements

## 2022-02-17 ENCOUNTER — PATIENT OUTREACH (OUTPATIENT)
Dept: FAMILY MEDICINE CLINIC | Facility: CLINIC | Age: 72
End: 2022-02-17

## 2022-02-17 ENCOUNTER — VBI (OUTPATIENT)
Dept: ADMINISTRATIVE | Facility: OTHER | Age: 72
End: 2022-02-17

## 2022-02-17 NOTE — TELEPHONE ENCOUNTER
I spoke to the patient she is aware that renal function is stable including vitamin D    - she is aware to stop vitamin d as calcium seems to be elevated still

## 2022-02-17 NOTE — TELEPHONE ENCOUNTER
Left voicemail asking patient to call the office to go over recent lab results and medication change

## 2022-02-18 ENCOUNTER — TELEPHONE (OUTPATIENT)
Dept: HEMATOLOGY ONCOLOGY | Facility: CLINIC | Age: 72
End: 2022-02-18

## 2022-02-18 NOTE — TELEPHONE ENCOUNTER
Patients daughter, Marvin Hills,  calling regarding phone call from yesterday  Phone call was from Nephrology

## 2022-02-20 ENCOUNTER — NURSE TRIAGE (OUTPATIENT)
Dept: OTHER | Facility: OTHER | Age: 72
End: 2022-02-20

## 2022-02-20 NOTE — TELEPHONE ENCOUNTER
Regarding: blood pressure/pulse care advice   ----- Message from [facility] TOMMY sent at 2/20/2022  3:52 PM EST -----  "I am calling on behalf of my mother, she had a stroke recently and I didn't know if her blood pressure or pulse is okay or too low, or even I should be concerned   Her blood pressure seems okay, 160/81 but her pulse is 58 and im trying to make sure that it is okay "

## 2022-02-20 NOTE — TELEPHONE ENCOUNTER
Reason for Disposition   Systolic BP  >= 597 OR Diastolic >= 198    Answer Assessment - Initial Assessment Questions  1  BLOOD PRESSURE: "What is the blood pressure?" "Did you take at least two measurements 5 minutes apart?"      160/78 at 3:40pm, 156/79 at 5pm,   HR 55     2  ONSET: "When did you take your blood pressure?"      As noted above    3  HOW: "How did you obtain the blood pressure?" (e g , visiting nurse, automatic home BP monitor)      Automatic home BP monitor    4  HISTORY: "Do you have a history of high blood pressure?"      Yes    5  MEDICATIONS: "Are you taking any medications for blood pressure?" "Have you missed any doses recently?"      Yes, hasn't missed any doses    6   OTHER SYMPTOMS: "Do you have any symptoms?" (e g , headache, chest pain, blurred vision, difficulty breathing, weakness)      [place] since yesterday    Protocols used: BLOOD PRESSURE - HIGH-ADULT-

## 2022-02-21 ENCOUNTER — OFFICE VISIT (OUTPATIENT)
Dept: FAMILY MEDICINE CLINIC | Facility: CLINIC | Age: 72
End: 2022-02-21
Payer: MEDICARE

## 2022-02-21 ENCOUNTER — TELEPHONE (OUTPATIENT)
Dept: INFUSION CENTER | Facility: CLINIC | Age: 72
End: 2022-02-21

## 2022-02-21 ENCOUNTER — HOSPITAL ENCOUNTER (OUTPATIENT)
Dept: INFUSION CENTER | Facility: CLINIC | Age: 72
Discharge: HOME | End: 2022-02-21
Payer: MEDICARE

## 2022-02-21 VITALS
HEIGHT: 64 IN | WEIGHT: 171.6 LBS | DIASTOLIC BLOOD PRESSURE: 70 MMHG | RESPIRATION RATE: 16 BRPM | OXYGEN SATURATION: 98 % | HEART RATE: 59 BPM | SYSTOLIC BLOOD PRESSURE: 140 MMHG | BODY MASS INDEX: 29.3 KG/M2 | TEMPERATURE: 97.4 F

## 2022-02-21 VITALS
BODY MASS INDEX: 29.02 KG/M2 | SYSTOLIC BLOOD PRESSURE: 160 MMHG | HEIGHT: 64 IN | OXYGEN SATURATION: 95 % | HEART RATE: 57 BPM | TEMPERATURE: 97.4 F | RESPIRATION RATE: 18 BRPM | DIASTOLIC BLOOD PRESSURE: 72 MMHG | WEIGHT: 170 LBS

## 2022-02-21 DIAGNOSIS — Z86.73 HISTORY OF STROKE: ICD-10-CM

## 2022-02-21 DIAGNOSIS — C34.92 ADENOCARCINOMA OF LEFT LUNG, STAGE 4 (HCC): ICD-10-CM

## 2022-02-21 DIAGNOSIS — T45.1X5A CHEMOTHERAPY INDUCED NEUTROPENIA (HCC): ICD-10-CM

## 2022-02-21 DIAGNOSIS — E83.52 HYPERCALCEMIA: Primary | ICD-10-CM

## 2022-02-21 DIAGNOSIS — C41.9 MALIGNANT NEOPLASM OF BONE WITH METASTASES (HCC): ICD-10-CM

## 2022-02-21 DIAGNOSIS — T45.1X5A CINV (CHEMOTHERAPY-INDUCED NAUSEA AND VOMITING): ICD-10-CM

## 2022-02-21 DIAGNOSIS — R60.0 BILATERAL LEG EDEMA: ICD-10-CM

## 2022-02-21 DIAGNOSIS — C34.12 MALIGNANT NEOPLASM OF UPPER LOBE OF LEFT LUNG (HCC): ICD-10-CM

## 2022-02-21 DIAGNOSIS — I50.32 CHRONIC DIASTOLIC HEART FAILURE (HCC): ICD-10-CM

## 2022-02-21 DIAGNOSIS — R11.2 CINV (CHEMOTHERAPY-INDUCED NAUSEA AND VOMITING): ICD-10-CM

## 2022-02-21 DIAGNOSIS — D70.1 CHEMOTHERAPY INDUCED NEUTROPENIA (HCC): ICD-10-CM

## 2022-02-21 DIAGNOSIS — I10 PRIMARY HYPERTENSION: Primary | ICD-10-CM

## 2022-02-21 PROBLEM — G93.40 ENCEPHALOPATHY: Status: RESOLVED | Noted: 2022-01-06 | Resolved: 2022-02-21

## 2022-02-21 PROCEDURE — 96409 CHEMO IV PUSH SNGL DRUG: CPT

## 2022-02-21 PROCEDURE — 96367 TX/PROPH/DG ADDL SEQ IV INF: CPT

## 2022-02-21 PROCEDURE — 99214 OFFICE O/P EST MOD 30 MIN: CPT | Performed by: FAMILY MEDICINE

## 2022-02-21 RX ORDER — POTASSIUM CHLORIDE 750 MG/1
10 TABLET, EXTENDED RELEASE ORAL DAILY
Qty: 30 TABLET | Refills: 1 | Status: SHIPPED | OUTPATIENT
Start: 2022-02-21 | End: 2022-03-15

## 2022-02-21 RX ORDER — VALSARTAN 160 MG/1
160 TABLET ORAL 2 TIMES DAILY
Qty: 60 TABLET | Refills: 3 | Status: SHIPPED | OUTPATIENT
Start: 2022-02-21 | End: 2022-08-07 | Stop reason: SDUPTHER

## 2022-02-21 RX ORDER — SODIUM CHLORIDE 9 MG/ML
20 INJECTION, SOLUTION INTRAVENOUS ONCE
Status: COMPLETED | OUTPATIENT
Start: 2022-02-21 | End: 2022-02-21

## 2022-02-21 RX ADMIN — SODIUM CHLORIDE 20 ML/HR: 0.9 INJECTION, SOLUTION INTRAVENOUS at 09:03

## 2022-02-21 RX ADMIN — VINORELBINE 47 MG: 10 INJECTION, SOLUTION INTRAVENOUS at 09:39

## 2022-02-21 RX ADMIN — ONDANSETRON 8 MG: 2 INJECTION INTRAMUSCULAR; INTRAVENOUS at 09:04

## 2022-02-21 NOTE — ASSESSMENT & PLAN NOTE
Wt Readings from Last 3 Encounters:   02/21/22 77 8 kg (171 lb 9 6 oz)   02/21/22 77 1 kg (170 lb)   02/16/22 79 1 kg (174 lb 6 4 oz)       On asix prn

## 2022-02-21 NOTE — TELEPHONE ENCOUNTER
Jacklyn Hanson called Sunday at 3:11pm regarding the patient's chemo scheduled for today 2/21 at 0830   Jacklyn Hanson believed that this appointment was supposed to be canceled as "The doctor wanted a CT and blood work done first "

## 2022-02-21 NOTE — PROGRESS NOTES
Patient here for first dose of Navelbine, offers no complaints  Labs reviewed and are within parameters for treatment  Port accessed without issue, daughter present at chairside

## 2022-02-21 NOTE — TELEPHONE ENCOUNTER
Spoke with Viktor Nolen and let her know Dr Jonathan Bowen is okay with pt getting treated today and CT scan 3/1

## 2022-02-21 NOTE — PROGRESS NOTES
Assessment/Plan:    1  Primary hypertension  Assessment & Plan:  bp still elevated on this combo  Switch to diovan    Orders:  -     valsartan (DIOVAN) 160 mg tablet; Take 1 tablet (160 mg total) by mouth 2 (two) times a day    2  History of stroke  Assessment & Plan:  Now with some left leg issues  Discussed calling neurology to make them aware      3  Chronic diastolic heart failure [facility])  Assessment & Plan:  Wt Readings from Last 3 Encounters:   02/21/22 77 8 kg (171 lb 9 6 oz)   02/21/22 77 1 kg (170 lb)   02/16/22 79 1 kg (174 lb 6 4 oz)       On asix prn        4  Bilateral leg edema  -     potassium chloride (K-DUR,KLOR-CON) 10 mEq tablet; Take 1 tablet (10 mEq total) by mouth daily      Falls Plan of Care: referral to physical therapy  PT is still working with her         There are no Patient Instructions on file for this visit  No follow-ups on file  Subjective:      Patient ID: Janeen Frankel is a 66 y o  female  Chief Complaint   Patient presents with    Headache     Patient here with headache for 3 days elevated blood pressure        For the last 3 days, headaches  Top of head  bp is erratic  Losartan is 100mg  Started limping with left leg  Felt dizzy  Improved with headache  Had chemo today bp's consistently in the 150's    Headache   This is a new problem  The current episode started in the past 7 days  The problem occurs constantly  The problem has been waxing and waning  The pain is located in the occipital region  The pain does not radiate  The quality of the pain is described as squeezing  Associated symptoms include eye pain  Pertinent negatives include no phonophobia or photophobia  The following portions of the patient's history were reviewed and updated as appropriate: allergies, current medications, past family history, past medical history, past social history, past surgical history and problem list     Review of Systems   Constitutional: Positive for fatigue     HENT: Negative  Eyes: Positive for pain  Negative for photophobia  Endocrine: Negative  Genitourinary: Negative  Musculoskeletal: Positive for gait problem and joint swelling (knee pain)  Allergic/Immunologic: Negative  Neurological: Positive for headaches  Hematological: Negative  Psychiatric/Behavioral: Negative            Current Outpatient Medications   Medication Sig Dispense Refill    acetaminophen (TYLENOL) 325 mg tablet Take 650 mg by mouth every 6 (six) hours as needed for mild pain      apixaban (Eliquis) 5 mg TAKE 1 TABLET TWICE A  tablet 3    atorvastatin (LIPITOR) 40 mg tablet Take 1 tablet (40 mg total) by mouth daily 30 tablet 1    busPIRone (BUSPAR) 7 5 mg tablet Take 7 5 mg by mouth 2 (two) times a day      cyanocobalamin (VITAMIN B-12) 100 mcg tablet Take by mouth daily      folic acid (FOLVITE) 1 mg tablet Take 1 tablet (1 mg total) by mouth daily 90 tablet 1    furosemide (LASIX) 20 mg tablet Take 1 tablet (20 mg total) by mouth daily as needed (leg edema) 30 tablet 0    linaGLIPtin (Tradjenta) 5 MG TABS Take 5 mg by mouth daily 90 tablet 3    loratadine (CLARITIN) 10 mg tablet Take 10 mg by mouth as needed        metFORMIN (GLUCOPHAGE) 1000 MG tablet Take 0 5 tablets (500 mg total) by mouth 2 (two) times a day with meals 180 tablet 3    metoprolol tartrate (LOPRESSOR) 25 mg tablet Take 1 tablet (25 mg total) by mouth every 12 (twelve) hours 180 tablet 3    omeprazole (PriLOSEC) 20 mg delayed release capsule Take 1 capsule (20 mg total) by mouth daily 90 capsule 3    sotalol (BETAPACE) 80 mg tablet Take 1 tablet (80 mg total) by mouth 2 (two) times a day 1 tab PO  tablet 3    venlafaxine (EFFEXOR) 37 5 mg tablet Take 1 tablet (37 5 mg total) by mouth daily 90 tablet 3    aspirin (ECOTRIN LOW STRENGTH) 81 mg EC tablet Take 1 tablet (81 mg total) by mouth daily 30 tablet 0    loperamide (IMODIUM) 2 mg capsule Take 2 mg by mouth 4 (four) times a day as needed for diarrhea (Patient not taking: Reported on 2/21/2022 )      potassium chloride (K-DUR,KLOR-CON) 10 mEq tablet Take 1 tablet (10 mEq total) by mouth daily 30 tablet 1    valsartan (DIOVAN) 160 mg tablet Take 1 tablet (160 mg total) by mouth 2 (two) times a day 60 tablet 3     No current facility-administered medications for this visit  Objective:    /70 (BP Location: Left arm, Patient Position: Sitting, Cuff Size: Standard)   Pulse 59   Temp (!) 97 4 °F (36 3 °C)   Resp 16   Ht 5' 4" (1 626 m)   Wt 77 8 kg (171 lb 9 6 oz)   LMP  (LMP Unknown)   SpO2 98%   BMI 29 46 kg/m²        Physical Exam  Vitals and nursing note reviewed  Constitutional:       Appearance: Normal appearance  HENT:      Head: Normocephalic and atraumatic  Eyes:      Extraocular Movements: Extraocular movements intact  Pupils: Pupils are equal, round, and reactive to light  Cardiovascular:      Rate and Rhythm: Normal rate and regular rhythm  Pulses: Normal pulses  Heart sounds: Normal heart sounds  Musculoskeletal:      Cervical back: Normal range of motion and neck supple  Neurological:      General: No focal deficit present  Mental Status: She is alert and oriented to person, place, and time  Psychiatric:         Mood and Affect: Mood normal          Behavior: Behavior normal          Thought Content:  Thought content normal          Judgment: Judgment normal                 Kimi Lucio DO

## 2022-02-22 ENCOUNTER — TELEPHONE (OUTPATIENT)
Dept: NEUROLOGY | Facility: CLINIC | Age: 72
End: 2022-02-22

## 2022-02-22 ENCOUNTER — PATIENT OUTREACH (OUTPATIENT)
Dept: FAMILY MEDICINE CLINIC | Facility: CLINIC | Age: 72
End: 2022-02-22

## 2022-02-22 ENCOUNTER — PATIENT OUTREACH (OUTPATIENT)
Dept: CASE MANAGEMENT | Facility: HOSPITAL | Age: 72
End: 2022-02-22

## 2022-02-22 DIAGNOSIS — Z78.9 NEEDS ASSISTANCE WITH COMMUNITY RESOURCES: Primary | ICD-10-CM

## 2022-02-22 NOTE — PROGRESS NOTES
Follow up call with daughter Helena Gonzalez  Patient was seen by Dr Jasmin Kuhn yesterday  Blood pressure continues to be elevated  Losartan discontinued  Started on Valsartan 160mg twice a day  Daughter reports they updated her pill box and she will have her first dose of Valsaartan this morning  OPCM will call back on Friday to check on blood pressure  Family has contracted with [facility] [employer] for a personal caregiver four hours a day  They are hoping to start soon, depends on finding an available caregiver  Daughter discussed concerns with her mother pursuing additional infusions  Supportive of her mothers decisions but worried the infusion will make her sick, expressed some thoughts if patient is making best decisions since she had her stroke  We discussed the availability of Oncology Social worker at [facility] and she is receptive to this [facility] placing referral with Oncology social worker  We discussed long term planning with living will and power of   She reports her mother created the documents awhile ago but never pursued notarizing  She will attempt to have these forms completed soon  Daughter also was advised to contact Neurology due to recent headaches  Helena Gonzalez left message with office yesterday and is awaiting a call back  Will follow up at end of week with patients brother Sammy Caldera as per Alecia Perea request as he lives with her mother and will be able to report her blood pressure  Referral placed for Oncology Social worker

## 2022-02-22 NOTE — PROGRESS NOTES
Biopsychosocial and Barriers Assessment     Cancer Diagnosis:Malignant neoplasm of unspecified part of right bronchus or lung    Home/Cell Phone: [phone]  Emergency Contact: Nancy Olmedo [phone]  Marital Status:   Interpretation concerns, speaks another language, preferred language: no concerns, english Pt does struggle since her stroke  Cultural concerns: none  Ability to read or write: no issues    Caregiver/Support: brother, son and daughter   Children: 2 adult children  Child/Elder care: n/a    Housing: own  Home Setup: 2 floors  Lives With: brother  Daily Living Activities: needs assistance  Durable Medical Equipment: utilizes handicap medical equipment in the home  Ambulation: at times can walk, has a bad knee at this time    Preferred Pharmacy: [facility]  High co-pays with insurance: no concerns  High co-pays with medication coverage: no concerns  No medication coverage: has coverage    Primary Care Provider: Kimi Lucio DO  Hx of [address]: HHA will be coming in to assist with pt  Hx of Short term rehab: recently after the stroke  Mental Health Hx: no hx  Substance Abuse Hx: no hx  Employment: retired  [place] Status/Location: [place] Qualified to pay bills: no concerns  POA/LW/AD: in place  Transportation Plan/Concerns: family       What do you know about your Cancer Diagnosis    What has your doctor told you about your cancer diagnosis: Pt is aware of her condition since day one and is confident with the information provided  What has your doctor told you about your cancer treatment: Pt is currently beginning chemo  What specific concerns do you have about your diagnosis and treatment: no concerns, feel she is getting to the end  Have you been made aware of any hair loss associated with treatment: n/a    Additional Comments:  Pts brother, Marvin Brady, spoke for the pt as pt struggles with speech since her recent stroke    Marvin Brady stated there are no concerns with financial, insurance nor transportation  They were questioning having pts POA notarized in the home, LSW provided information on notary services  Sarah Hanson stated he and Lissy Arguelles own the home and live in it together  He stated Sushila's son stays nights in the home to care for pt and her daughter spends weekends in the home  Pt will be having HHA start with 4 hours a day for assistance in personal care but in the summer her daughter will take on those responsibilities  Sarah Hanson stated they do realize that pt may be nearing the end of life and will be speaking with her team to decide on further care needed when that time comes  LSW provided contact information and encouraged calls for assistance

## 2022-02-22 NOTE — TELEPHONE ENCOUNTER
Daughter Kerry Amanda called in to report headache x 4 days, weakness yesterday, and high BP since stroke  Daughter is out of town today and cannot tell me symptoms as of right now  She suggested I call wither patient's brother Everett Vaelncia [phone], or patient's son Humble Bunch [phone]  Called Everett Valencia who let me speak with the patient  Patient informed he she did have a headache at the crown of her head b/l for 4 days though this has resolved today  When she had her stroke, headache was located more in the occipital region  As for BP, reports they spoke with PCP yesterday - stopped the losartan and started valsartan today  BP today at 1 pm 158/92  BP has been elevated since stroke - usually in the mid-high 150s  Also claims yesterday she had knee pain and weakness of the left leg though she believes this was due to her chronic knee pain / arthritis - claims she would like it replaced but is not a candidate  Patient feels baseline today  I educated her on stroke like symptoms and when to call 9-1-1  I also mailed stroke education materials to her  At this time, she will continue to work with PCP to manager her BP and call us for any further questions or concerns  Called daughter back and informed her of our call, education, and recs  She will also monitor for stroke like symptoms

## 2022-02-25 DIAGNOSIS — T46.6X5A MYALGIA DUE TO STATIN: ICD-10-CM

## 2022-02-25 DIAGNOSIS — R60.0 BILATERAL LEG EDEMA: ICD-10-CM

## 2022-02-25 DIAGNOSIS — M79.10 MYALGIA DUE TO STATIN: ICD-10-CM

## 2022-02-25 RX ORDER — ATORVASTATIN CALCIUM 40 MG/1
TABLET, FILM COATED ORAL
Qty: 30 TABLET | Refills: 1 | Status: SHIPPED | OUTPATIENT
Start: 2022-02-25 | End: 2022-03-23

## 2022-02-25 RX ORDER — FUROSEMIDE 20 MG/1
20 TABLET ORAL DAILY PRN
Qty: 30 TABLET | Refills: 0 | Status: SHIPPED | OUTPATIENT
Start: 2022-02-25 | End: 2022-03-23

## 2022-03-01 ENCOUNTER — HOSPITAL ENCOUNTER (OUTPATIENT)
Dept: CT IMAGING | Facility: HOSPITAL | Age: 72
Discharge: HOME | End: 2022-03-01
Attending: INTERNAL MEDICINE
Payer: MEDICARE

## 2022-03-01 DIAGNOSIS — C41.9 MALIGNANT NEOPLASM OF BONE WITH METASTASES (HCC): ICD-10-CM

## 2022-03-01 DIAGNOSIS — C34.91 MALIGNANT NEOPLASM OF UNSPECIFIED PART OF RIGHT BRONCHUS OR LUNG (HCC): ICD-10-CM

## 2022-03-01 PROCEDURE — G1004 CDSM NDSC: HCPCS

## 2022-03-01 PROCEDURE — 71260 CT THORAX DX C+: CPT

## 2022-03-01 PROCEDURE — 74177 CT ABD & PELVIS W/CONTRAST: CPT

## 2022-03-01 RX ADMIN — IODIXANOL 100 ML: 320 INJECTION, SOLUTION INTRAVASCULAR at 14:49

## 2022-03-02 ENCOUNTER — TELEPHONE (OUTPATIENT)
Dept: INFUSION CENTER | Facility: CLINIC | Age: 72
End: 2022-03-02

## 2022-03-02 NOTE — TELEPHONE ENCOUNTER
Patient called to go over her scheduled infusion appointments  Patient confirmed her upcomming appointments with the infusion center, and asked to change the time on 3/7 from 2:00pm to 2:30pm as she was worried it would overlap with her doctor's appointment

## 2022-03-03 ENCOUNTER — TELEPHONE (OUTPATIENT)
Dept: LAB | Facility: HOSPITAL | Age: 72
End: 2022-03-03

## 2022-03-04 ENCOUNTER — APPOINTMENT (OUTPATIENT)
Dept: LAB | Facility: AMBULARY SURGERY CENTER | Age: 72
End: 2022-03-04
Payer: MEDICARE

## 2022-03-04 ENCOUNTER — TELEPHONE (OUTPATIENT)
Dept: HEMATOLOGY ONCOLOGY | Facility: CLINIC | Age: 72
End: 2022-03-04

## 2022-03-04 DIAGNOSIS — T45.1X5A CHEMOTHERAPY INDUCED NEUTROPENIA (HCC): ICD-10-CM

## 2022-03-04 DIAGNOSIS — C34.92 ADENOCARCINOMA OF LEFT LUNG, STAGE 4 (HCC): ICD-10-CM

## 2022-03-04 DIAGNOSIS — C41.9 MALIGNANT NEOPLASM OF BONE WITH METASTASES (HCC): ICD-10-CM

## 2022-03-04 DIAGNOSIS — T45.1X5A CINV (CHEMOTHERAPY-INDUCED NAUSEA AND VOMITING): ICD-10-CM

## 2022-03-04 DIAGNOSIS — D70.1 CHEMOTHERAPY INDUCED NEUTROPENIA (HCC): ICD-10-CM

## 2022-03-04 DIAGNOSIS — R11.2 CINV (CHEMOTHERAPY-INDUCED NAUSEA AND VOMITING): ICD-10-CM

## 2022-03-04 DIAGNOSIS — C34.12 MALIGNANT NEOPLASM OF UPPER LOBE OF LEFT LUNG (HCC): ICD-10-CM

## 2022-03-04 DIAGNOSIS — E83.52 HYPERCALCEMIA: ICD-10-CM

## 2022-03-04 LAB
ALBUMIN SERPL BCP-MCNC: 3.1 G/DL (ref 3.5–5)
ALP SERPL-CCNC: 97 U/L (ref 46–116)
ALT SERPL W P-5'-P-CCNC: 20 U/L (ref 12–78)
ANION GAP SERPL CALCULATED.3IONS-SCNC: 4 MMOL/L (ref 4–13)
AST SERPL W P-5'-P-CCNC: 17 U/L (ref 5–45)
BASOPHILS # BLD AUTO: 0.05 THOUSANDS/ΜL (ref 0–0.1)
BASOPHILS NFR BLD AUTO: 1 % (ref 0–1)
BILIRUB SERPL-MCNC: 0.36 MG/DL (ref 0.2–1)
BUN SERPL-MCNC: 31 MG/DL (ref 5–25)
CALCIUM ALBUM COR SERPL-MCNC: 10.4 MG/DL (ref 8.3–10.1)
CALCIUM SERPL-MCNC: 9.7 MG/DL (ref 8.3–10.1)
CHLORIDE SERPL-SCNC: 113 MMOL/L (ref 100–108)
CO2 SERPL-SCNC: 24 MMOL/L (ref 21–32)
CREAT SERPL-MCNC: 1.48 MG/DL (ref 0.6–1.3)
EOSINOPHIL # BLD AUTO: 0.19 THOUSAND/ΜL (ref 0–0.61)
EOSINOPHIL NFR BLD AUTO: 4 % (ref 0–6)
ERYTHROCYTE [DISTWIDTH] IN BLOOD BY AUTOMATED COUNT: 14.5 % (ref 11.6–15.1)
GFR SERPL CREATININE-BSD FRML MDRD: 34 ML/MIN/1.73SQ M
GLUCOSE P FAST SERPL-MCNC: 149 MG/DL (ref 65–99)
HCT VFR BLD AUTO: 37.5 % (ref 34.8–46.1)
HGB BLD-MCNC: 11.8 G/DL (ref 11.5–15.4)
IMM GRANULOCYTES # BLD AUTO: 0.01 THOUSAND/UL (ref 0–0.2)
IMM GRANULOCYTES NFR BLD AUTO: 0 % (ref 0–2)
LYMPHOCYTES # BLD AUTO: 0.72 THOUSANDS/ΜL (ref 0.6–4.47)
LYMPHOCYTES NFR BLD AUTO: 15 % (ref 14–44)
MCH RBC QN AUTO: 28.6 PG (ref 26.8–34.3)
MCHC RBC AUTO-ENTMCNC: 31.5 G/DL (ref 31.4–37.4)
MCV RBC AUTO: 91 FL (ref 82–98)
MONOCYTES # BLD AUTO: 0.33 THOUSAND/ΜL (ref 0.17–1.22)
MONOCYTES NFR BLD AUTO: 7 % (ref 4–12)
NEUTROPHILS # BLD AUTO: 3.4 THOUSANDS/ΜL (ref 1.85–7.62)
NEUTS SEG NFR BLD AUTO: 73 % (ref 43–75)
NRBC BLD AUTO-RTO: 0 /100 WBCS
PLATELET # BLD AUTO: 187 THOUSANDS/UL (ref 149–390)
PMV BLD AUTO: 11.4 FL (ref 8.9–12.7)
POTASSIUM SERPL-SCNC: 4.8 MMOL/L (ref 3.5–5.3)
PROT SERPL-MCNC: 6.5 G/DL (ref 6.4–8.2)
RBC # BLD AUTO: 4.13 MILLION/UL (ref 3.81–5.12)
SODIUM SERPL-SCNC: 141 MMOL/L (ref 136–145)
WBC # BLD AUTO: 4.7 THOUSAND/UL (ref 4.31–10.16)

## 2022-03-04 PROCEDURE — 85025 COMPLETE CBC W/AUTO DIFF WBC: CPT

## 2022-03-04 PROCEDURE — 80053 COMPREHEN METABOLIC PANEL: CPT

## 2022-03-04 PROCEDURE — 36415 COLL VENOUS BLD VENIPUNCTURE: CPT

## 2022-03-07 ENCOUNTER — OFFICE VISIT (OUTPATIENT)
Dept: HEMATOLOGY ONCOLOGY | Facility: CLINIC | Age: 72
End: 2022-03-07
Payer: MEDICARE

## 2022-03-07 ENCOUNTER — HOSPITAL ENCOUNTER (OUTPATIENT)
Dept: INFUSION CENTER | Facility: CLINIC | Age: 72
Discharge: HOME | End: 2022-03-07
Payer: MEDICARE

## 2022-03-07 ENCOUNTER — OFFICE VISIT (OUTPATIENT)
Dept: PALLIATIVE MEDICINE | Facility: CLINIC | Age: 72
End: 2022-03-07
Payer: MEDICARE

## 2022-03-07 ENCOUNTER — SOCIAL WORK (OUTPATIENT)
Dept: PALLIATIVE MEDICINE | Facility: CLINIC | Age: 72
End: 2022-03-07
Payer: MEDICARE

## 2022-03-07 VITALS
HEIGHT: 64 IN | HEART RATE: 70 BPM | BODY MASS INDEX: 29.37 KG/M2 | TEMPERATURE: 95.3 F | SYSTOLIC BLOOD PRESSURE: 122 MMHG | WEIGHT: 172 LBS | OXYGEN SATURATION: 97 % | RESPIRATION RATE: 16 BRPM | DIASTOLIC BLOOD PRESSURE: 68 MMHG

## 2022-03-07 VITALS
SYSTOLIC BLOOD PRESSURE: 124 MMHG | BODY MASS INDEX: 29.19 KG/M2 | RESPIRATION RATE: 16 BRPM | TEMPERATURE: 95.9 F | OXYGEN SATURATION: 98 % | WEIGHT: 171 LBS | DIASTOLIC BLOOD PRESSURE: 70 MMHG | HEART RATE: 67 BPM | HEIGHT: 64 IN

## 2022-03-07 VITALS
HEIGHT: 64 IN | RESPIRATION RATE: 16 BRPM | BODY MASS INDEX: 29.19 KG/M2 | WEIGHT: 171 LBS | HEART RATE: 67 BPM | DIASTOLIC BLOOD PRESSURE: 70 MMHG | SYSTOLIC BLOOD PRESSURE: 124 MMHG | OXYGEN SATURATION: 98 %

## 2022-03-07 DIAGNOSIS — C34.90 ADENOCARCINOMA OF LUNG, STAGE 4, UNSPECIFIED LATERALITY (HCC): Primary | ICD-10-CM

## 2022-03-07 DIAGNOSIS — C79.51 SECONDARY MALIGNANT NEOPLASM OF BONE AND BONE MARROW (HCC): ICD-10-CM

## 2022-03-07 DIAGNOSIS — C79.52 SECONDARY MALIGNANT NEOPLASM OF BONE AND BONE MARROW (HCC): ICD-10-CM

## 2022-03-07 DIAGNOSIS — R53.83 OTHER FATIGUE: ICD-10-CM

## 2022-03-07 DIAGNOSIS — G47.00 INSOMNIA, UNSPECIFIED TYPE: ICD-10-CM

## 2022-03-07 DIAGNOSIS — R11.2 CINV (CHEMOTHERAPY-INDUCED NAUSEA AND VOMITING): ICD-10-CM

## 2022-03-07 DIAGNOSIS — C41.9 MALIGNANT NEOPLASM OF BONE WITH METASTASES (HCC): ICD-10-CM

## 2022-03-07 DIAGNOSIS — G89.3 CANCER RELATED PAIN: Chronic | ICD-10-CM

## 2022-03-07 DIAGNOSIS — F41.9 ANXIETY: ICD-10-CM

## 2022-03-07 DIAGNOSIS — Z51.5 PALLIATIVE CARE PATIENT: ICD-10-CM

## 2022-03-07 DIAGNOSIS — T45.1X5A CHEMOTHERAPY INDUCED NEUTROPENIA (HCC): ICD-10-CM

## 2022-03-07 DIAGNOSIS — Z71.89 GOALS OF CARE, COUNSELING/DISCUSSION: ICD-10-CM

## 2022-03-07 DIAGNOSIS — I48.0 PAROXYSMAL ATRIAL FIBRILLATION (HCC): ICD-10-CM

## 2022-03-07 DIAGNOSIS — E83.52 HYPERCALCEMIA: Primary | ICD-10-CM

## 2022-03-07 DIAGNOSIS — N18.32 STAGE 3B CHRONIC KIDNEY DISEASE (HCC): ICD-10-CM

## 2022-03-07 DIAGNOSIS — I50.32 CHRONIC DIASTOLIC HEART FAILURE (HCC): ICD-10-CM

## 2022-03-07 DIAGNOSIS — F32.5 MAJOR DEPRESSIVE DISORDER WITH SINGLE EPISODE, IN FULL REMISSION (HCC): ICD-10-CM

## 2022-03-07 DIAGNOSIS — D70.1 CHEMOTHERAPY INDUCED NEUTROPENIA (HCC): ICD-10-CM

## 2022-03-07 DIAGNOSIS — Z86.73 HISTORY OF STROKE: ICD-10-CM

## 2022-03-07 DIAGNOSIS — C34.12 MALIGNANT NEOPLASM OF UPPER LOBE OF LEFT LUNG (HCC): ICD-10-CM

## 2022-03-07 DIAGNOSIS — C34.90 MALIGNANT NEOPLASM OF LUNG, UNSPECIFIED LATERALITY, UNSPECIFIED PART OF LUNG (HCC): Primary | ICD-10-CM

## 2022-03-07 DIAGNOSIS — T45.1X5A CINV (CHEMOTHERAPY-INDUCED NAUSEA AND VOMITING): ICD-10-CM

## 2022-03-07 DIAGNOSIS — Z71.89 ADVANCED CARE PLANNING/COUNSELING DISCUSSION: ICD-10-CM

## 2022-03-07 DIAGNOSIS — C34.92 ADENOCARCINOMA OF LEFT LUNG, STAGE 4 (HCC): ICD-10-CM

## 2022-03-07 DIAGNOSIS — Z71.89 COUNSELING AND COORDINATION OF CARE: Primary | ICD-10-CM

## 2022-03-07 DIAGNOSIS — K21.9 GASTROESOPHAGEAL REFLUX DISEASE WITHOUT ESOPHAGITIS: ICD-10-CM

## 2022-03-07 PROCEDURE — 99214 OFFICE O/P EST MOD 30 MIN: CPT | Performed by: PHYSICIAN ASSISTANT

## 2022-03-07 PROCEDURE — 99497 ADVNCD CARE PLAN 30 MIN: CPT | Performed by: INTERNAL MEDICINE

## 2022-03-07 PROCEDURE — 99214 OFFICE O/P EST MOD 30 MIN: CPT | Performed by: INTERNAL MEDICINE

## 2022-03-07 PROCEDURE — 96367 TX/PROPH/DG ADDL SEQ IV INF: CPT

## 2022-03-07 PROCEDURE — 96409 CHEMO IV PUSH SNGL DRUG: CPT

## 2022-03-07 PROCEDURE — NC001 PR NO CHARGE

## 2022-03-07 RX ORDER — OXYCODONE HYDROCHLORIDE 5 MG/1
5 TABLET ORAL EVERY 6 HOURS PRN
Qty: 30 TABLET | Refills: 0 | Status: SHIPPED | OUTPATIENT
Start: 2022-03-07

## 2022-03-07 RX ORDER — SODIUM CHLORIDE 9 MG/ML
20 INJECTION, SOLUTION INTRAVENOUS ONCE
Status: COMPLETED | OUTPATIENT
Start: 2022-03-07 | End: 2022-03-07

## 2022-03-07 RX ADMIN — VINORELBINE 47 MG: 10 INJECTION, SOLUTION INTRAVENOUS at 15:09

## 2022-03-07 RX ADMIN — SODIUM CHLORIDE 20 ML/HR: 9 INJECTION, SOLUTION INTRAVENOUS at 14:30

## 2022-03-07 RX ADMIN — ONDANSETRON 8 MG: 2 INJECTION INTRAMUSCULAR; INTRAVENOUS at 14:36

## 2022-03-07 NOTE — PROGRESS NOTES
Pt here for Navalbine  Vitals stable; labs within parameters for treatment  Callbell within reach; will continue to monitor

## 2022-03-07 NOTE — PROGRESS NOTES
Palliative Supportive Care  met with patient/family to continue to provide emotional support and guidance  Updated biopsychosocial information relevant to support: Met with pt and pt's dtr Dwight Thompson in office  Pt discussed her recent stroke and hospitalization  After her hospitalization she completed a rehab program at [facility] and continued therapy services once she returned home  Pt feels she progressed with PT/OT and is "feeling really good " Starting tomorrow or Wednesday (depending on scheduling) pt will have a HHA daily M-F for 4 hrs to assist her with her morning routine  Pt's family also obtained a "panic button" for pt to notify if she needs help from family (not connected to emergency services)  Pt has strong support from her children and other family members  Pt feels she is coping very well overall emotionally and appears to be in very good spirits during visit  Pt continues to have a strong sense of humor  Pt scheduled to have oncology appt today after [facility] visit  POLST completed for pt today by Dr Radha Chowdhury  Pt agreeable with on-going visits with LSW for emotional support      Identified areas of need include: Emotional Support  Resources provided: None  Areas that need future follow-up include: Emotional Support

## 2022-03-07 NOTE — PATIENT INSTRUCTIONS
It was good to see you today  Thank you for coming in  · Thank you for completing the POLST advanced directive  We will scan it in to the system  · Sending a prescription for oxycodone; use as directed, as-needed  · Return in about 2 months  · Call us for refills on medications that we supply, as needed  · If something changes and you need to come in sooner, please call our office  PRESCRIPTION REFILL REMINDER:  All medication refills should be requested prior to [facility] on Friday  Any refill requests after noon on Friday would be addressed the following Monday

## 2022-03-07 NOTE — PLAN OF CARE
Problem: Potential for Falls  Goal: Patient will remain free of falls  Description: INTERVENTIONS:  - Educate patient/family on patient safety including physical limitations  - Instruct patient to call for assistance with activity   - Keep Call bell within reach  - Keep care items and personal belongings within reach  - Initiate and maintain comfort rounds  Outcome: Progressing

## 2022-03-07 NOTE — PROGRESS NOTES
Hematology/Oncology Outpatient Follow- up Note  Valery Griffith 66 y o  female MRN: @ Encounter: [id number]        Date:  3/7/2022      Assessment / Plan:    Stage 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 of the lung with left scapula involvement diagnosed on 08/2016 PDL expression more than 50%, negative for EGFR mutation, ALK  rearrangement, Ros1 mutation     Treated initially with Pembrolizumab complicated with pneumonitis and later on nivolumab with prednisone 10 mg p o  Daily with excellent response      2   Disease progression in August 2018 in the left scapula with pain no new lesions by PET scan   Status post radiation therapy to the left scapula and treated with Alimta 500 milligram/meter squared, carboplatin AUC 5     After 3 cycles,  CT scan in January 2019 showed stable disease, and she was placed on maintenance Alimta 500 milligram/meter squared every 3 weeks         Alimta dose was reduced to 400 milligram/meter IV due to elevated Creatinine and  then Pemetrexed dose was reduced to 300 milligram/meter squared every 4 weeks    Cycle #38  Alimta was 6/22/21          3   Progression of disease 7/2021        Nivolumab 240 mg flat dose every 3 weeks and  carboplatin AUC 5 added to  pemetrexed which was dose reduced to 250 milligram/meter squared, initiated 8/2/2021          6   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%   (MAP2K1 mutations are mutually exclusive with BRAF mutations)  There are FDA approved therapies indicated for other disease states such as binimetinib, cobimetinib, selumetinib, trametinib   Given the very small (0 1%)  DNA amplification, use of these medications off label are likely to offer minimal benefit           7   CKD   She has established care with nephrology  Radha Ferris is on tighter BP management      8   Atrial fibrillation   On eliquis   Continue            9   Progression of Disease on CT 9/23/21 as evidenced by further increase in extensive left upper/lower lobe airspace consolidation with somewhat masslike configuration, initiated on Taxotere Cyramza on 10/08/2021, she received 3 cycles with Neulasta growth factor support to prevent chemotherapy-induced neutropenia      CT chest 11/29/21 after 3 cycles showed overall stable disease   Development of left moderate pleural effusion   Patient asymptomatic, clinically very good breath sounds         CT 1/20221 showed favorable response        10  Right basal ganglia and right caudate ischemic CVA 1/2021 S/P hospitalization rehabilitation     ASA 81 mg po added to  Eliquis 5mg BID     11  After discussion the patient decided to proceed with subsequent line of therapy  She declined palliative care/ hospice      Navelbine 25 milligram/meter every other week initiated 2/21/22    Baseline CT scan of the chest abdomen and pelvis 3/1/22     F/U in 4-6 weeks with repeat CT scan prior               HPI:    Joi Martinez was admitted to the hospital with arrhythmia and was found to have right lower lobe infiltrate in August 2016   She was treated with antibiotics however repeat chest x-ray showed persistent right lower lobe infiltrate  Subsequently the patient had a CT scan of the chest which showed a right perihilar mass, subcarinal lymphadenopathy, lytic lesion of the right costovertebral junction at T10 level   PET scan October 2016 showed a right perihilar mass measuring 3 5 cm with SUV of 8 9, subcarinal lymph nodes measuring 3 4 x 2 2 cm with SUV of 9 2  Nodule in the left upper lobe lung measured 2 x 1 1 cm   There was a lytic lesion involving the right 10th costovertebral junction with SUV of 14  4      Biopsy showed non-small cell carcinoma with features suggesting of adenocarcinoma positive for CK 7, CK 19, CA-19-9, ANEUDY-3, partially positive for P40, p63, negative for TTF-1   She had a history of uterine cancer in 2000 status post hysterectomy and did not require radiation or chemotherapy   She is status post bilateral oophorectomy, right knee replacement,   tonsillectomy       She used to smoke for 35 years 1 pack per day however quit smoking 21 years ago   She used hormonal replacement therapy for 3 years  Nahomy Medina has a family history significant for skin cancer in her father and coronary artery disease in mother  Nahomy Medina has 2 healthy children      Treated initially with Pembrolizumab December 2016, Finished in May 2017 secondary to grade 3 pneumonitis  Initiated on prednisone     Progression: Nivolumab 240 mg flat dose every 2 weeks along with prednisone 10 mg p o  Daily initiated April 2018- October 2018 with excellent response      Liquid biopsy showed K-MADHURI mutation G12C, no evidence of MSI high        Disease progression in August 2018 in the left scapula with pain no new lesions by PET scan   Status post radiation therapy to the left scapula and treated with Alimta 500 milligram/meter squared, carboplatin AUC 5   After 3 cycles,  CT scan in January 2019 showed stable disease  Carbo discontinued 3/2019    Maintenance Alimta 500 milligram/meter squared every 3 weeks initiated 3/2019        Cr 2/20 was 1 5   Plan was to dose reduce Alimta to 400mg/m2; however cr 2 16 2/24/20 and Alimta was held       3/4/20:  renal u/s  Minimal fullness of the left renal collecting system without matthieu hydronephrosis     Chronic right kidney lower pole cortical scar, with adjacent parenchymal calcification measuring 5 mm      She was on prednisone 5 mg p o  daily because of previous history of pneumonitis  CT scan chest 1/3/2020 showed no evidence of infiltration in the lung parenchyma, prednisone was reduced every other day for 1 month and then discontinued        CT scan 4/2020 showed stable disease in the left upper lobe of the lung     CT scan on 07/2020 showed stable disease in the left upper lobe of the lung, pancreatic cyst 2 3 cm, stable from the previous CT scan however bigger from last year CT scan       Chronic LLE edema since fall she had 2/2020    Venous doppler negative for clot      CT scan in February 2021 showed stable left upper lobe lung nodule however with progressive chronic kidney disease we decided to hold pemetrexed, status post SBRT to the left upper lobe lung nodule     CT scan of the chest in June 2021 showed increase in the size of the right lower lobe lung mass may be progression of disease versus inflammation/ infection     PET scan on 07/14/2021 showed large consolidation in the left lobe might be metastatic disease versus inflammation, uptake in enlarging right lower lobe lung lesion suspicious for malignancy, no evidence of disease in the neck abdomen or pelvis, mild disease in the left scapula      Nivolumab 240 mg flat dose every 3 weeks, pemetrexed 250 milligram/meter squared, carboplatin AUC 5 every 3 weeks Initiated 8/2/2021 8/23/21 Evaluated patient in infusion center  Erythematous rash to forehead, circumferentially around neck R > L, and on right hand near IV site  Rash blanches and is not raised, well circumscribed borders  Presumed secondary to Opdivo vs alimta  She did receive her 2nd cycle of treatment        Patient with worsening renal function and hypercalcemia    Alimta held and Xgeva given 9/24/21  Vitamin-D level as well as iPTH and parathyroid hormone related protein requested but not drawn  Repeat BMP requested - not drawn        Progression of Disease on CT 9/23/21    Further increase in extensive left upper/lower lobe airspace consolidation with somewhat masslike configuration   Nodular region right diaphragmatic leaflet  measuring up to 2 9 x 1 6 cm   3 0 x 2 4 previously initiated on Ramucirumab 10 milligram/kilogram, Taxotere 75 milligram/meter squared every 3 weeks on 10/15/2021  C diff colitis 10/19/21 treated with metronidazole successful        Noncontrast (per patient request)Brain MRI 11/1/21 prior to initiation of Jerry Courser did not identify metastases         Admitted 1/3 - 1/21/22 regarding recurrent falls and altered mental status   Negative CT head however brain  MRI ultimately revealed a acute right basal ganglia ischemic CVA   MRA/MRV negative for occlusive disease   MRI brain with contrast negative for any enhancing lesions   Patient seen by Neurology and determined etiology of CVA small vessel disease  Aspirin was added to her secondary CVA ppx     She was treated for UTI        Transitioned to [facility] for rehabilitation 2nd to decrease from her baseline in mobility, cognition and self care        At her 2/14/22 f/u options discussed  She declined hospice/palliative care  Interval History: Tolerating Navelbine well  Test Results:        Labs:   Lab Results   Component Value Date    HGB 11 8 03/04/2022    HCT 37 5 03/04/2022    MCV 91 03/04/2022     03/04/2022    WBC 4 70 03/04/2022    NRBC 0 03/04/2022     Lab Results   Component Value Date     11/23/2015    K 4 8 03/04/2022     (H) 03/04/2022    CO2 24 03/04/2022    ANIONGAP 9 11/23/2015    BUN 31 (H) 03/04/2022    CREATININE 1 48 (H) 03/04/2022    GLUCOSE 98 01/03/2022    GLUF 149 (H) 03/04/2022    CALCIUM 9 7 03/04/2022    CORRECTEDCA 10 4 (H) 03/04/2022    AST 17 03/04/2022    ALT 20 03/04/2022    ALKPHOS 97 03/04/2022    PROT 6 8 11/23/2015    BILITOT 0 65 11/23/2015    EGFR 34 03/04/2022       Imaging: CT chest abdomen pelvis w contrast    Result Date: 3/4/2022  Narrative: CT CHEST, ABDOMEN AND PELVIS WITH IV CONTRAST INDICATION:   C34 91: Malignant neoplasm of unspecified part of right bronchus or lung C41 9: 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, unspecified  COMPARISON:  Multiple prior exams, most recently 1/3/2022  TECHNIQUE: CT examination of the chest, abdomen and pelvis was performed  Axial, sagittal, and coronal 2D reformatted images were created from the source data and submitted for interpretation  Radiation dose length product (DLP) for this visit:  633 mGy-cm   This examination, like all CT scans performed in the [facility], was performed utilizing techniques to minimize radiation dose exposure, including the use of iterative reconstruction and automated exposure control  IV Contrast:  100 mL of iodixanol (VISIPAQUE) Enteric Contrast: Enteric contrast was administered  FINDINGS: CHEST LUNGS:  Left suprahilar consolidation is again identified likely combination of treatment-related changes as well as potential underlying residual tumor  The consolidation previously seen within the left upper lobe more peripheral to this central opacity has slightly improved and is likely infectious or inflammatory in etiology  7 mm medial lingular nodule is noted series 4 image 61  This is not identified on exam of 6/9/2021  Stable linear opacities are noted within the left lower lobe  Linear bandlike opacity is again noted within the medial right upper lobe significantly less masslike than on the exam of 6/9/2021  There is no tracheal or endobronchial lesion  PLEURA:  Small left pleural effusion is noted  HEART/GREAT VESSELS: Heart is unremarkable for patient's age  No thoracic aortic aneurysm  MEDIASTINUM AND ANABEL:  Small hiatal hernia is noted  CHEST WALL AND LOWER NECK:   Unremarkable  ABDOMEN LIVER/BILIARY TREE:  One or more subcentimeter sharply circumscribed low-density hepatic lesion(s) are noted, too small to accurately characterize, but statistically most likely to represent subcentimeter hepatic cysts  No suspicious solid hepatic lesion is identified  Hepatic contours are normal   No biliary dilatation  GALLBLADDER:  Gallbladder is surgically absent  SPLEEN:  Unremarkable  PANCREAS:  Stable 2 9 cm pancreatic body cystic lesion is noted  ADRENAL GLANDS:  Unremarkable  KIDNEYS/URETERS:  Right renal cortical scarring with focus of calcification again noted  Left kidney is unremarkable  STOMACH AND BOWEL:  Unremarkable   APPENDIX:  No findings to suggest appendicitis  ABDOMINOPELVIC CAVITY:  No ascites  No pneumoperitoneum  No lymphadenopathy  VESSELS:  Unremarkable for patient's age  PELVIS REPRODUCTIVE ORGANS:  Surgical changes of prior hysterectomy  URINARY BLADDER:  Unremarkable  ABDOMINAL WALL/INGUINAL REGIONS:  Unremarkable  OSSEOUS STRUCTURES:  Treated metastatic lesion involving the right 10th rib at the costovertebral junction is again noted  Impression: Left suprahilar opacity again identified likely treatment related with underlying residual tumor not excluded  Opacities seen on the prior exam more peripheral have slightly improved and are likely infectious or inflammatory in etiology  7 mm lingular nodule which which is not identified on study of 6/9/2021 and developing metastasis is not excluded  Attention on follow-up recommended  Persistent linear bandlike opacity within the medial right lower lobe  This can also be reassessed at follow-up  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  Workstation performed: [id number]           ROS:  As mentioned in HPI & Interval History otherwise 14 point ROS negative  Allergies: Allergies   Allergen Reactions    Amoxicillin Hives    Amoxicillin Rash and Hives    Cardizem [Diltiazem] Rash     Rash      Statins Myalgia     Severe muscle aching  Terrible pains    Zofran [Ondansetron] Palpitations     Current Medications: Reviewed  PMH/FH/SH:  Reviewed      Physical Exam:    There is no height or weight on file to calculate BSA      Ht Readings from Last 3 Encounters:   03/07/22 5' 4" (1 626 m)   03/07/22 5' 4" (1 626 m)   02/21/22 5' 4" (1 626 m)        Wt Readings from Last 3 Encounters:   03/07/22 77 6 kg (171 lb)   03/07/22 78 kg (172 lb)   02/21/22 77 1 kg (170 lb)        Temp Readings from Last 3 Encounters:   03/07/22 (!) 95 9 °F (35 5 °C) (Temporal)   03/07/22 (!) 95 3 °F (35 2 °C) (Temporal)   02/21/22 (!) 97 4 °F (36 3 °C) (Temporal)        BP Readings from Last 3 Encounters:   22 124/70   22 122/68   22 160/72           Physical Exam  Vitals reviewed  Constitutional:       General: She is not in acute distress  Appearance: She is well-developed  She is not diaphoretic  HENT:      Head: Normocephalic and atraumatic  Eyes:      Conjunctiva/sclera: Conjunctivae normal    Neck:      Trachea: No tracheal deviation  Cardiovascular:      Rate and Rhythm: Normal rate and regular rhythm  Heart sounds: No murmur heard  No friction rub  No gallop  Pulmonary:      Effort: Pulmonary effort is normal  No respiratory distress  Breath sounds: Normal breath sounds  No wheezing or rales  Chest:      Chest wall: No tenderness  Abdominal:      General: There is no distension  Palpations: Abdomen is soft  Tenderness: There is no abdominal tenderness  Musculoskeletal:      Cervical back: Normal range of motion and neck supple  Lymphadenopathy:      Cervical: No cervical adenopathy  Skin:     General: Skin is warm and dry  Coloration: Skin is not pale  Findings: No erythema  Neurological:      Mental Status: She is alert and oriented to person, place, and time  Psychiatric:         Behavior: Behavior normal          Thought Content:  Thought content normal          Judgment: Judgment normal          ECO      Emergency Contacts:    Extended Emergency Contact Information  Primary Emergency Contact: aPtel Reinoso  Address: [address] [address] [place] [employer] Phone: [phone]  Relation: Brother  Secondary Emergency Contact: Liz Booth  Address: [facility] [facility] [employer] Phone: [phone]  Relation: Daughter

## 2022-03-07 NOTE — PROGRESS NOTES
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)    3  History of stroke    4  Chronic diastolic heart failure (HCC)    5  Paroxysmal atrial fibrillation (HCC)    6  Stage 3b chronic kidney disease ([facility] [address] )    7  Major depressive disorder with single episode, in full remission ([facility] )    8  Anxiety    9  Gastroesophageal reflux disease without esophagitis    10  Cancer related pain    11  Insomnia, unspecified type    12  Other fatigue    13  Palliative care patient    14  Goals of care, counseling/discussion    15  Advanced care planning/counseling discussion           Patient feels Tylenol is sufficient for her chronic pain (polyarthralgia, primarily) needs  She would like to have oxyIR at home for PRN use; sending prescription   Patient reports regular bowel function   Patient reports she is slowly but steadily recovering from her recent CVA  She is enjoying strong family support, and they plan to start HHA services this week   Continue buspirone + venlafaxine for mood   Nausea greatly improved since chemotherapy was held, but treatments will resume today  Continue antiemetic regimen PRN   Patient has received [address]  vaccinations   Portion of history obtained from patient's daughter Hedy Delgado, who manages patient's medications   Separate 20 minutes spent reviewing and completing advanced directives (POLST)  Counseling provided  All questions answered  Document signed and countersigned and will be scanned into the chart   POA document scanned into chart today   Goals addressed today  While the patient does not wish to receive heroic resuscitative measures, she does not wish to limit access to healthcare or hospitalization  She wishes to continue to purse disease-directed care   She is familiar with hospice but does not feel it is appropriate for her at this time   Patient has received [address]  vaccinations   Reviewed notes (DC Summary, SW, FM, Cardiology, Medical Oncology, Neurology), labs (12/1/6/21: Cr 1 48, eGFR 34, alb 2 6, Hb 10 5; alb was 2 7 on 11/2/21), imaging (3/1/22 CTCAP, 1/12/22 CT head, 1/5/22 MRI brain)   Emotional support provided   Medication safety issues addressed - no driving under the influence of narcotics, watch for adverse effects including AMS and respiratory depression, keep medications stored in a safe/locked environment  Requested Prescriptions     Signed Prescriptions Disp Refills    oxyCODONE (Roxicodone) 5 immediate release tablet 30 tablet 0     Sig: Take 1 tablet (5 mg total) by mouth every 6 (six) hours as needed for moderate pain or severe pain Max Daily Amount: 20 mg       There are no discontinued medications  Representatives have queried the patient's controlled substance dispensing history in the Prescription Drug Monitoring Program in compliance with regulations before I have prescribed any controlled substances  The prescription history is consistent with prescribed therapy and our practice policies  20+ minutes (separate from and in addition to ACP time detailed above) were spent face to face with patient and family (daughter Marvin Hills) with greater than 50% of the time spent in counseling or coordination of care including discussions of symptom assessment and management, medication review and adjustment, psychosocial support, chart review, imaging review, lab review, goals of care, hospice services, supportive listening and anticipatory guidance  All of the patient's questions were answered during this discussion  Return in about 2 months (around 5/7/2022)      SUBJECTIVE:  Chief Complaint   Patient presents with    Follow-up    Cancer    Cancer Pain    Cerebrovascular Accident    Anxiety    Depression    Edema    Goals TUCEKR Blanco is a 66 y o  female w/ stage IV non-small cell lung cancer (diagnosed 2016) w/ osseous metastasis s/p chemotherapy + immunotherapy + RT, DM2, HFpEF, Afib, CKD, HTN+HLD, h/o CVA  She follows w/ Ute Luis PA-C + Dr Leah Landa (Medical Oncology), [facility]  Plan includes systemic therapy w/ vinorelbine  Patient is known to [facility] clinic; last seen by me 11/24/21 for symptom management, psychosocial support  Long admission in 01/2022 s/t acute R basal ganglia + R caudate ischemic CVA  Patient reports she is slowly recovering from her recent CVA  She is ambulating w/ a walker, noting some decreased exercise capacity from her prior baseline  She is able to remain independent of most ADLs including feeding, toileting, etc  Her family has been very supportive throughout this ordeal, but they are planning to hire a private HHA to be with her during the day  Patient has been giving her health trajectory some thought  She has completed a healthcare POA document and brought it in to be scanned  She requests a POLST today, primarily as she wishes to avoid the traumas of CPR as there is a low chance of returning to her baseline afterwards  She had not put much thought into topics such as artificial nutrition/hydration  She states she would not intubation/ventilation, after some consideration  She does not wish to place limits on ambulatory or inpatient healthcare  She has some personal experience w/ hospice (family members have used that service at [facility]) and feels it is not appropriate for her at this time  Patient feels that Tylenol is generally sufficient for her chronic pains (primarily R shoulder, b/l knees)  In the past she had used oxyIR for cancer-related bone pain, and would like to have that medication on hand should that pain return  This is a reasonable request given her condition      Patient had noted a decrease in symptoms (diarrhea, nausea, etc) while chemotherapy was on hold, but has concerns that symptoms might return with her new regimen  PDMP shows no concerns  The following portions of the medical history were reviewed: past medical history, problem list, medication list, and social history        Current Outpatient Medications:     acetaminophen (TYLENOL) 325 mg tablet, Take 650 mg by mouth every 6 (six) hours as needed for mild pain, Disp: , Rfl:     apixaban (Eliquis) 5 mg, TAKE 1 TABLET TWICE A DAY, Disp: 180 tablet, Rfl: 3    aspirin (ECOTRIN LOW STRENGTH) 81 mg EC tablet, Take 1 tablet (81 mg total) by mouth daily, Disp: 30 tablet, Rfl: 0    atorvastatin (LIPITOR) 40 mg tablet, TAKE 1 TABLET BY MOUTH EVERY DAY, Disp: 30 tablet, Rfl: 1    busPIRone (BUSPAR) 7 5 mg tablet, Take 7 5 mg by mouth 2 (two) times a day, Disp: , Rfl:     cyanocobalamin (VITAMIN B-12) 100 mcg tablet, Take by mouth daily, Disp: , Rfl:     folic acid (FOLVITE) 1 mg tablet, Take 1 tablet (1 mg total) by mouth daily, Disp: 90 tablet, Rfl: 1    furosemide (LASIX) 20 mg tablet, TAKE 1 TABLET (20 MG TOTAL) BY MOUTH DAILY AS NEEDED (LEG EDEMA), Disp: 30 tablet, Rfl: 0    linaGLIPtin (Tradjenta) 5 MG TABS, Take 5 mg by mouth daily, Disp: 90 tablet, Rfl: 3    loperamide (IMODIUM) 2 mg capsule, Take 2 mg by mouth 4 (four) times a day as needed for diarrhea  , Disp: , Rfl:     loratadine (CLARITIN) 10 mg tablet, Take 10 mg by mouth as needed  , Disp: , Rfl:     metFORMIN (GLUCOPHAGE) 1000 MG tablet, Take 0 5 tablets (500 mg total) by mouth 2 (two) times a day with meals, Disp: 180 tablet, Rfl: 3    metoprolol tartrate (LOPRESSOR) 25 mg tablet, Take 1 tablet (25 mg total) by mouth every 12 (twelve) hours, Disp: 180 tablet, Rfl: 3    omeprazole (PriLOSEC) 20 mg delayed release capsule, Take 1 capsule (20 mg total) by mouth daily, Disp: 90 capsule, Rfl: 3    potassium chloride (K-DUR,KLOR-CON) 10 mEq tablet, Take 1 tablet (10 mEq total) by mouth daily, Disp: 30 tablet, Rfl: 1    sotalol (BETAPACE) 80 mg tablet, Take 1 tablet (80 mg total) by mouth 2 (two) times a day 1 tab PO BID, Disp: 180 tablet, Rfl: 3    valsartan (DIOVAN) 160 mg tablet, Take 1 tablet (160 mg total) by mouth 2 (two) times a day, Disp: 60 tablet, Rfl: 3    venlafaxine (EFFEXOR) 37 5 mg tablet, Take 1 tablet (37 5 mg total) by mouth daily, Disp: 90 tablet, Rfl: 3    oxyCODONE (Roxicodone) 5 immediate release tablet, Take 1 tablet (5 mg total) by mouth every 6 (six) hours as needed for moderate pain or severe pain Max Daily Amount: 20 mg, Disp: 30 tablet, Rfl: 0    Review of Systems   Constitutional: Positive for activity change (slowly improving), fatigue and unexpected weight change  Negative for appetite change  HENT: Negative for trouble swallowing  Eyes: Negative for pain and redness  Cardiovascular: Negative for chest pain  Gastrointestinal: Positive for nausea (improved)  Negative for abdominal pain, constipation and diarrhea  Endocrine: Negative for polydipsia and polyphagia  Musculoskeletal: Positive for arthralgias and gait problem  Allergic/Immunologic: Positive for immunocompromised state  Psychiatric/Behavioral: Negative for dysphoric mood and sleep disturbance  The patient is not nervous/anxious  OBJECTIVE:  /68 (BP Location: Left arm, Patient Position: Sitting, Cuff Size: Standard)   Pulse 70   Temp (!) 95 3 °F (35 2 °C) (Temporal)   Resp 16   Ht 5' 4" (1 626 m)   Wt 78 kg (172 lb)   LMP  (LMP Unknown)   SpO2 97%   BMI 29 52 kg/m²   Physical Exam  Vitals reviewed  Constitutional:       General: She is not in acute distress  Appearance: She is overweight  She is ill-appearing (chronically)  She is not toxic-appearing  Comments: Debilitated  HENT:      Head: Normocephalic and atraumatic  Right Ear: External ear normal       Left Ear: External ear normal    Eyes:      General: No scleral icterus  Right eye: No discharge           Left eye: No discharge  Extraocular Movements: Extraocular movements intact  Conjunctiva/sclera: Conjunctivae normal       Pupils: Pupils are equal, round, and reactive to light  Cardiovascular:      Rate and Rhythm: Normal rate  Pulmonary:      Effort: Pulmonary effort is normal  No tachypnea, bradypnea, accessory muscle usage or respiratory distress  Abdominal:      General: There is no distension  Tenderness: There is no guarding  Musculoskeletal:      Right lower leg: No edema  Left lower leg: No edema  Skin:     General: Skin is dry  Coloration: Skin is pale  Neurological:      Mental Status: She is alert and oriented to person, place, and time  Cranial Nerves: No dysarthria  Motor: Weakness present  Gait: Gait abnormal (using walker)  Psychiatric:         Attention and Perception: Attention normal          Mood and Affect: Mood and affect normal          Speech: Speech normal          Behavior: Behavior normal  Behavior is cooperative  Thought Content: Thought content normal          Cognition and Memory: She exhibits impaired recent memory  Judgment: Judgment normal           Abelino Reyes MD  [facility] Supportive [facility]      Portions of this document may have been created using dictation software and as such some "sound alike" terms may have been generated by the system  Do not hesitate to contact me with any questions or clarifications

## 2022-03-09 ENCOUNTER — OFFICE VISIT (OUTPATIENT)
Dept: FAMILY MEDICINE CLINIC | Facility: CLINIC | Age: 72
End: 2022-03-09
Payer: MEDICARE

## 2022-03-09 VITALS
WEIGHT: 169.4 LBS | TEMPERATURE: 96.8 F | DIASTOLIC BLOOD PRESSURE: 80 MMHG | HEIGHT: 64 IN | RESPIRATION RATE: 16 BRPM | BODY MASS INDEX: 28.92 KG/M2 | OXYGEN SATURATION: 100 % | SYSTOLIC BLOOD PRESSURE: 140 MMHG | HEART RATE: 84 BPM

## 2022-03-09 DIAGNOSIS — E11.9 CONTROLLED TYPE 2 DIABETES MELLITUS WITHOUT COMPLICATION, WITHOUT LONG-TERM CURRENT USE OF INSULIN (HCC): ICD-10-CM

## 2022-03-09 DIAGNOSIS — I10 PRIMARY HYPERTENSION: Primary | ICD-10-CM

## 2022-03-09 DIAGNOSIS — C34.12 MALIGNANT NEOPLASM OF UPPER LOBE OF LEFT LUNG (HCC): ICD-10-CM

## 2022-03-09 PROCEDURE — 99214 OFFICE O/P EST MOD 30 MIN: CPT | Performed by: FAMILY MEDICINE

## 2022-03-09 NOTE — PROGRESS NOTES
Assessment/Plan:    1  Primary hypertension  Assessment & Plan:  Improved with new bp combo        2  Controlled type 2 diabetes mellitus without complication, without long-term current use of insulin [facility])  Assessment & Plan:  Check a1c in april  Lab Results   Component Value Date    HGBA1C 5 8 (H) 01/05/2022       Orders:  -     Hemoglobin A1C; Future; Expected date: 04/01/2022    3  Malignant neoplasm of upper lobe of left lung [facility])  Assessment & Plan:  Recent cp improved            There are no Patient Instructions on file for this visit  Return in about 4 weeks (around 4/6/2022) for Annual physical     Subjective:      Patient ID: Rajinder Alejandro is a 66 y o  female  Chief Complaint   Patient presents with    Follow-up     Patient here for follow up on Hypertension, Type II Diabetes        Here for bp follow up  Bps at home 125/88, 126/80, 128/83  Blood sugars running 153-181    Hypertension  This is a chronic problem  The current episode started more than 1 year ago  The problem has been gradually improving since onset  The problem is controlled  There are no associated agents to hypertension  Past treatments include angiotensin blockers  The current treatment provides significant improvement  There are no compliance problems  The following portions of the patient's history were reviewed and updated as appropriate: allergies, current medications, past family history, past medical history, past social history, past surgical history and problem list     Review of Systems   Constitutional: Negative  HENT: Negative  Eyes: Negative  Respiratory: Negative  Cardiovascular: Negative  Gastrointestinal: Negative  Endocrine: Negative  Genitourinary: Negative  Musculoskeletal: Positive for arthralgias (knee pain)  Allergic/Immunologic: Negative  Neurological: Negative  Hematological: Negative  Psychiatric/Behavioral: Negative            Current Outpatient Medications Medication Sig Dispense Refill    acetaminophen (TYLENOL) 325 mg tablet Take 650 mg by mouth every 6 (six) hours as needed for mild pain      apixaban (Eliquis) 5 mg TAKE 1 TABLET TWICE A  tablet 3    aspirin (ECOTRIN LOW STRENGTH) 81 mg EC tablet Take 1 tablet (81 mg total) by mouth daily 30 tablet 0    atorvastatin (LIPITOR) 40 mg tablet TAKE 1 TABLET BY MOUTH EVERY DAY 30 tablet 1    busPIRone (BUSPAR) 7 5 mg tablet Take 7 5 mg by mouth 2 (two) times a day      cyanocobalamin (VITAMIN B-12) 100 mcg tablet Take by mouth daily      folic acid (FOLVITE) 1 mg tablet Take 1 tablet (1 mg total) by mouth daily 90 tablet 1    furosemide (LASIX) 20 mg tablet TAKE 1 TABLET (20 MG TOTAL) BY MOUTH DAILY AS NEEDED (LEG EDEMA) 30 tablet 0    linaGLIPtin (Tradjenta) 5 MG TABS Take 5 mg by mouth daily 90 tablet 3    loperamide (IMODIUM) 2 mg capsule Take 2 mg by mouth 4 (four) times a day as needed for diarrhea        loratadine (CLARITIN) 10 mg tablet Take 10 mg by mouth as needed        metFORMIN (GLUCOPHAGE) 1000 MG tablet Take 0 5 tablets (500 mg total) by mouth 2 (two) times a day with meals 180 tablet 3    metoprolol tartrate (LOPRESSOR) 25 mg tablet Take 1 tablet (25 mg total) by mouth every 12 (twelve) hours 180 tablet 3    omeprazole (PriLOSEC) 20 mg delayed release capsule Take 1 capsule (20 mg total) by mouth daily 90 capsule 3    oxyCODONE (Roxicodone) 5 immediate release tablet Take 1 tablet (5 mg total) by mouth every 6 (six) hours as needed for moderate pain or severe pain Max Daily Amount: 20 mg 30 tablet 0    potassium chloride (K-DUR,KLOR-CON) 10 mEq tablet Take 1 tablet (10 mEq total) by mouth daily 30 tablet 1    sotalol (BETAPACE) 80 mg tablet Take 1 tablet (80 mg total) by mouth 2 (two) times a day 1 tab PO  tablet 3    valsartan (DIOVAN) 160 mg tablet Take 1 tablet (160 mg total) by mouth 2 (two) times a day 60 tablet 3    venlafaxine (EFFEXOR) 37 5 mg tablet Take 1 tablet (37 5 mg total) by mouth daily 90 tablet 3     No current facility-administered medications for this visit  Objective:    /80 (BP Location: Left arm, Patient Position: Sitting, Cuff Size: Standard)   Pulse 84   Temp (!) 96 8 °F (36 °C)   Resp 16   Ht 5' 4" (1 626 m)   Wt 76 8 kg (169 lb 6 4 oz)   LMP  (LMP Unknown)   SpO2 100%   BMI 29 08 kg/m²        Physical Exam  Vitals and nursing note reviewed  Constitutional:       Appearance: Normal appearance  HENT:      Head: Normocephalic and atraumatic  Eyes:      Extraocular Movements: Extraocular movements intact  Pupils: Pupils are equal, round, and reactive to light  Cardiovascular:      Rate and Rhythm: Normal rate and regular rhythm  Pulses: Normal pulses  Heart sounds: Normal heart sounds  Pulmonary:      Effort: Pulmonary effort is normal       Breath sounds: Normal breath sounds  Abdominal:      General: Abdomen is flat  Palpations: Abdomen is soft  Musculoskeletal:      Cervical back: Normal range of motion and neck supple  Skin:     Capillary Refill: Capillary refill takes less than 2 seconds  Neurological:      General: No focal deficit present  Mental Status: She is alert and oriented to person, place, and time  Psychiatric:         Mood and Affect: Mood normal          Behavior: Behavior normal          Thought Content:  Thought content normal          Judgment: Judgment normal                 Nicolas Freeman DO

## 2022-03-14 ENCOUNTER — TELEPHONE (OUTPATIENT)
Dept: LAB | Facility: HOSPITAL | Age: 72
End: 2022-03-14

## 2022-03-15 ENCOUNTER — TELEPHONE (OUTPATIENT)
Dept: PALLIATIVE MEDICINE | Facility: CLINIC | Age: 72
End: 2022-03-15

## 2022-03-15 ENCOUNTER — APPOINTMENT (OUTPATIENT)
Dept: LAB | Facility: HOSPITAL | Age: 72
End: 2022-03-15
Attending: INTERNAL MEDICINE
Payer: MEDICARE

## 2022-03-15 DIAGNOSIS — C41.9 MALIGNANT NEOPLASM OF BONE WITH METASTASES (HCC): ICD-10-CM

## 2022-03-15 DIAGNOSIS — C34.91 MALIGNANT NEOPLASM OF UNSPECIFIED PART OF RIGHT BRONCHUS OR LUNG (HCC): ICD-10-CM

## 2022-03-15 LAB
ALBUMIN SERPL BCP-MCNC: 3.2 G/DL (ref 3.5–5)
ALP SERPL-CCNC: 92 U/L (ref 46–116)
ALT SERPL W P-5'-P-CCNC: 26 U/L (ref 12–78)
ANION GAP SERPL CALCULATED.3IONS-SCNC: 5 MMOL/L (ref 4–13)
AST SERPL W P-5'-P-CCNC: 25 U/L (ref 5–45)
BASOPHILS # BLD AUTO: 0.07 THOUSANDS/ΜL (ref 0–0.1)
BASOPHILS NFR BLD AUTO: 2 % (ref 0–1)
BILIRUB SERPL-MCNC: 0.42 MG/DL (ref 0.2–1)
BUN SERPL-MCNC: 34 MG/DL (ref 5–25)
CALCIUM ALBUM COR SERPL-MCNC: 10.2 MG/DL (ref 8.3–10.1)
CALCIUM SERPL-MCNC: 9.6 MG/DL (ref 8.3–10.1)
CHLORIDE SERPL-SCNC: 110 MMOL/L (ref 100–108)
CO2 SERPL-SCNC: 26 MMOL/L (ref 21–32)
CREAT SERPL-MCNC: 1.49 MG/DL (ref 0.6–1.3)
EOSINOPHIL # BLD AUTO: 0.17 THOUSAND/ΜL (ref 0–0.61)
EOSINOPHIL NFR BLD AUTO: 4 % (ref 0–6)
ERYTHROCYTE [DISTWIDTH] IN BLOOD BY AUTOMATED COUNT: 13.6 % (ref 11.6–15.1)
GFR SERPL CREATININE-BSD FRML MDRD: 34 ML/MIN/1.73SQ M
GLUCOSE P FAST SERPL-MCNC: 137 MG/DL (ref 65–99)
HCT VFR BLD AUTO: 39.4 % (ref 34.8–46.1)
HGB BLD-MCNC: 12.1 G/DL (ref 11.5–15.4)
IMM GRANULOCYTES # BLD AUTO: 0.02 THOUSAND/UL (ref 0–0.2)
IMM GRANULOCYTES NFR BLD AUTO: 0 % (ref 0–2)
LYMPHOCYTES # BLD AUTO: 0.78 THOUSANDS/ΜL (ref 0.6–4.47)
LYMPHOCYTES NFR BLD AUTO: 16 % (ref 14–44)
MCH RBC QN AUTO: 28 PG (ref 26.8–34.3)
MCHC RBC AUTO-ENTMCNC: 30.7 G/DL (ref 31.4–37.4)
MCV RBC AUTO: 91 FL (ref 82–98)
MONOCYTES # BLD AUTO: 0.28 THOUSAND/ΜL (ref 0.17–1.22)
MONOCYTES NFR BLD AUTO: 6 % (ref 4–12)
NEUTROPHILS # BLD AUTO: 3.46 THOUSANDS/ΜL (ref 1.85–7.62)
NEUTS SEG NFR BLD AUTO: 72 % (ref 43–75)
NRBC BLD AUTO-RTO: 0 /100 WBCS
PLATELET # BLD AUTO: 184 THOUSANDS/UL (ref 149–390)
PMV BLD AUTO: 11.8 FL (ref 8.9–12.7)
POTASSIUM SERPL-SCNC: 4.8 MMOL/L (ref 3.5–5.3)
PROT SERPL-MCNC: 6.3 G/DL (ref 6.4–8.2)
RBC # BLD AUTO: 4.32 MILLION/UL (ref 3.81–5.12)
SODIUM SERPL-SCNC: 141 MMOL/L (ref 136–145)
WBC # BLD AUTO: 4.78 THOUSAND/UL (ref 4.31–10.16)

## 2022-03-15 PROCEDURE — 85025 COMPLETE CBC W/AUTO DIFF WBC: CPT

## 2022-03-15 PROCEDURE — 80053 COMPREHEN METABOLIC PANEL: CPT

## 2022-03-15 PROCEDURE — 36415 COLL VENOUS BLD VENIPUNCTURE: CPT

## 2022-03-15 NOTE — TELEPHONE ENCOUNTER
Pt called Palliative office  LMOM reporting having feelings of vertigo on and off past week  Pt read side effects of Buspirone and is asking if this could be reason for symptoms  Call placed to pt   Pt denies N/V, duration when it occurs is about 10 min  When it occurs pt states she does not get out of bed or attempt to walk  Pt reports the trigger is often turning her head to the right " and then everything starts to spin "  Conversation with pt was pleasant , no distress  Instructed to take time when going from lying to sitting to standing , pausing bt each move to make sure stable  Pt verbalized understanding  Will wait for instructions from Dr Memo Mcclure       Thank you

## 2022-03-16 ENCOUNTER — TELEPHONE (OUTPATIENT)
Dept: HEMATOLOGY ONCOLOGY | Facility: CLINIC | Age: 72
End: 2022-03-16

## 2022-03-16 NOTE — TELEPHONE ENCOUNTER
Received completed paperwork and forwarded it to [facility]  Asked them to schedule 3/21 transportation if pt's application is accepted

## 2022-03-16 NOTE — TELEPHONE ENCOUNTER
CALL RETURN FORM   Reason for patient call? Transportation for infusion treatments    Patient's primary oncologist? Dr Bernard Kyle   Name of person the patient was calling for? Patel Reinoso-brother   Any additional information to add, if applicable? Please contact brother at [phone]   Informed patient that the message will be forwarded to the team and someone will get back to them as soon as possible    Did you relay this information to the patient?   yes

## 2022-03-16 NOTE — TELEPHONE ENCOUNTER
Call to pt to review Dr Kristel Barth instructions  Pt verbalized understanding and may reach out to PCP  She thinks she has an appointment soon  Pt has My Chart but is having difficulty navigating the site  This nurse printed instructions for assessing for and treatment of vertigo and mailed to p tto review       Done task

## 2022-03-16 NOTE — TELEPHONE ENCOUNTER
It's very unlikely that buspirone is causing her vertigo, as the incidence of vertigo in patients on this medication is less than 1%  What she is describing sounds like BPPV (benign paroxysmal positional vertigo)  She may benefit from discussing this with her PCP or possibly a referral to ENT, but the treatment for BPPV is either another medication (meclizine) or repositioning maneuvers that can be done in her PCP's office or an ENT office, or she can try on her own  If you have vertigo, you'll need to know what type it is and which ear has the problem  To determine affected side:    1  Sit on bed so that if you lie down, your head hangs slightly over the end of the bed  2  turn head to the right and lie back quickly  3  Wait 1 minute  4  If you feel dizzy, then the right ear is your affected ear  5  If no dizziness occurs, sit up   6  Wait 1 minute  7  Turn head to the left and lie back quickly  8  Wait 1 minute  9  If you feel dizzy, then the left ear is your affected ear  If you have BPPV, certain actions can move the calcium crystals that cause the problem out of your ear canal  That should bring relief  Epley Maneuver    If your vertigo comes from your left ear and side:    1  Sit on the edge of your bed  Turn your head 45 degrees to the left (not as far as your left shoulder)  Place a pillow under you so when you lie down, it rests between your shoulders rather than under your head  2  Quickly lie down on your back, with your head on the bed (still at the 45-degree angle)  The pillow should be under your shoulders  Wait 30 seconds (for any vertigo to stop)  3  Turn your head nursing home (90 degrees) to the right without raising it  Wait 30 seconds  4  Turn your head and body on its side to the right, so you're looking at the floor  Wait 30 seconds  5  Slowly sit up, but remain on the bed a few minutes  6  If the vertigo comes from your right ear, reverse these instructions   Sit on your bed, turn your head 45 degrees to the right, and so on  Do these movements three times before going to bed each night, until you've gone 24 hours without dizziness

## 2022-03-16 NOTE — TELEPHONE ENCOUNTER
Spoke with brother who would like to have [facility] for her 3/21 visit  Appt is at 2pm - will confirm with [facility] they are able to accommodate this

## 2022-03-17 ENCOUNTER — PATIENT OUTREACH (OUTPATIENT)
Dept: FAMILY MEDICINE CLINIC | Facility: CLINIC | Age: 72
End: 2022-03-17

## 2022-03-17 NOTE — PROGRESS NOTES
Follow up call with Bruna Montes  She reports she if feeling well but recently experiencing episodes of vertigo  She has reported this to Dr Kristel Barth  They are mailing patient recommendations for Epley Maneuver  We reviewed taking caution when changing positions, sitting at side of bed before standing  Encouraged fluids  Demonstrated understanding with use of teachback    Recent blood pressure reports for past few days have been, 125/88, 126/80 and 128/83  Ambulating with use of walker  Agrees to final call in one month at end of bundled episode

## 2022-03-21 ENCOUNTER — HOSPITAL ENCOUNTER (OUTPATIENT)
Dept: INFUSION CENTER | Facility: CLINIC | Age: 72
Discharge: HOME | End: 2022-03-21
Payer: MEDICARE

## 2022-03-21 VITALS
HEIGHT: 64 IN | SYSTOLIC BLOOD PRESSURE: 110 MMHG | HEART RATE: 58 BPM | RESPIRATION RATE: 16 BRPM | OXYGEN SATURATION: 99 % | WEIGHT: 173.5 LBS | DIASTOLIC BLOOD PRESSURE: 64 MMHG | TEMPERATURE: 96.6 F | BODY MASS INDEX: 29.62 KG/M2

## 2022-03-21 DIAGNOSIS — E83.52 HYPERCALCEMIA: Primary | ICD-10-CM

## 2022-03-21 DIAGNOSIS — D70.1 CHEMOTHERAPY INDUCED NEUTROPENIA (HCC): ICD-10-CM

## 2022-03-21 DIAGNOSIS — C34.92 ADENOCARCINOMA OF LEFT LUNG, STAGE 4 (HCC): ICD-10-CM

## 2022-03-21 DIAGNOSIS — T45.1X5A CINV (CHEMOTHERAPY-INDUCED NAUSEA AND VOMITING): ICD-10-CM

## 2022-03-21 DIAGNOSIS — T45.1X5A CHEMOTHERAPY INDUCED NEUTROPENIA (HCC): ICD-10-CM

## 2022-03-21 DIAGNOSIS — C34.90 ADENOCARCINOMA OF LUNG, STAGE 4, UNSPECIFIED LATERALITY (HCC): Primary | ICD-10-CM

## 2022-03-21 DIAGNOSIS — F32.5 MAJOR DEPRESSIVE DISORDER WITH SINGLE EPISODE, IN FULL REMISSION (HCC): ICD-10-CM

## 2022-03-21 DIAGNOSIS — C79.51 SECONDARY MALIGNANT NEOPLASM OF BONE AND BONE MARROW (HCC): ICD-10-CM

## 2022-03-21 DIAGNOSIS — R11.2 CINV (CHEMOTHERAPY-INDUCED NAUSEA AND VOMITING): ICD-10-CM

## 2022-03-21 DIAGNOSIS — Z51.5 PALLIATIVE CARE PATIENT: ICD-10-CM

## 2022-03-21 DIAGNOSIS — C41.9 MALIGNANT NEOPLASM OF BONE WITH METASTASES (HCC): ICD-10-CM

## 2022-03-21 DIAGNOSIS — C34.12 MALIGNANT NEOPLASM OF UPPER LOBE OF LEFT LUNG (HCC): ICD-10-CM

## 2022-03-21 DIAGNOSIS — C79.52 SECONDARY MALIGNANT NEOPLASM OF BONE AND BONE MARROW (HCC): ICD-10-CM

## 2022-03-21 DIAGNOSIS — F41.9 ANXIETY: ICD-10-CM

## 2022-03-21 PROCEDURE — 96409 CHEMO IV PUSH SNGL DRUG: CPT

## 2022-03-21 PROCEDURE — 96367 TX/PROPH/DG ADDL SEQ IV INF: CPT

## 2022-03-21 RX ORDER — BUSPIRONE HYDROCHLORIDE 7.5 MG/1
7.5 TABLET ORAL 2 TIMES DAILY
Qty: 60 TABLET | Refills: 2 | Status: SHIPPED | OUTPATIENT
Start: 2022-03-21 | End: 2022-06-15

## 2022-03-21 RX ORDER — SODIUM CHLORIDE 9 MG/ML
20 INJECTION, SOLUTION INTRAVENOUS ONCE
Status: COMPLETED | OUTPATIENT
Start: 2022-03-21 | End: 2022-03-21

## 2022-03-21 RX ADMIN — ONDANSETRON 8 MG: 2 INJECTION INTRAMUSCULAR; INTRAVENOUS at 14:31

## 2022-03-21 RX ADMIN — VINORELBINE 47 MG: 10 INJECTION, SOLUTION INTRAVENOUS at 15:09

## 2022-03-21 RX ADMIN — SODIUM CHLORIDE 20 ML/HR: 0.9 INJECTION, SOLUTION INTRAVENOUS at 14:30

## 2022-03-21 NOTE — PROGRESS NOTES
Patient tolerated treatment without complications  Patient declined AVS  Reviewed upcoming appointments with patient  [facility] notified for patient

## 2022-03-21 NOTE — PROGRESS NOTES
Patient here for trent  Patient resting with no complaints  Vitals stable  Labs within parameters for treatment  [facility] within reach of patient  Will continue to monitor

## 2022-03-21 NOTE — TELEPHONE ENCOUNTER
Primary palliative medicine provider:  Kerry Mena     Medication requested:  Buspirone    If for pain, how has the patient been taking their pain medicine?      Last appointment:3/7/22    Next scheduled appointment: 5/5/22    PDMP review:  NA

## 2022-03-23 DIAGNOSIS — M79.10 MYALGIA DUE TO STATIN: ICD-10-CM

## 2022-03-23 DIAGNOSIS — T46.6X5A MYALGIA DUE TO STATIN: ICD-10-CM

## 2022-03-23 DIAGNOSIS — R60.0 BILATERAL LEG EDEMA: ICD-10-CM

## 2022-03-23 RX ORDER — ATORVASTATIN CALCIUM 40 MG/1
TABLET, FILM COATED ORAL
Qty: 30 TABLET | Refills: 1 | Status: SHIPPED | OUTPATIENT
Start: 2022-03-23 | End: 2022-04-19

## 2022-03-23 RX ORDER — FUROSEMIDE 20 MG/1
20 TABLET ORAL DAILY PRN
Qty: 30 TABLET | Refills: 0 | Status: SHIPPED | OUTPATIENT
Start: 2022-03-23 | End: 2022-04-19

## 2022-03-28 DIAGNOSIS — I48.91 ATRIAL FIBRILLATION, UNSPECIFIED TYPE (HCC): ICD-10-CM

## 2022-03-28 RX ORDER — SOTALOL HYDROCHLORIDE 80 MG/1
80 TABLET ORAL 2 TIMES DAILY
Qty: 180 TABLET | Refills: 3 | Status: SHIPPED | OUTPATIENT
Start: 2022-03-28

## 2022-04-02 ENCOUNTER — APPOINTMENT (OUTPATIENT)
Dept: LAB | Facility: CLINIC | Age: 72
End: 2022-04-02
Payer: MEDICARE

## 2022-04-02 DIAGNOSIS — E83.52 HYPERCALCEMIA: ICD-10-CM

## 2022-04-02 DIAGNOSIS — T45.1X5A CINV (CHEMOTHERAPY-INDUCED NAUSEA AND VOMITING): ICD-10-CM

## 2022-04-02 DIAGNOSIS — T45.1X5A CHEMOTHERAPY INDUCED NEUTROPENIA (HCC): ICD-10-CM

## 2022-04-02 DIAGNOSIS — C34.92 ADENOCARCINOMA OF LEFT LUNG, STAGE 4 (HCC): ICD-10-CM

## 2022-04-02 DIAGNOSIS — D70.1 CHEMOTHERAPY INDUCED NEUTROPENIA (HCC): ICD-10-CM

## 2022-04-02 DIAGNOSIS — C41.9 MALIGNANT NEOPLASM OF BONE WITH METASTASES (HCC): ICD-10-CM

## 2022-04-02 DIAGNOSIS — C34.12 MALIGNANT NEOPLASM OF UPPER LOBE OF LEFT LUNG (HCC): ICD-10-CM

## 2022-04-02 DIAGNOSIS — E11.9 CONTROLLED TYPE 2 DIABETES MELLITUS WITHOUT COMPLICATION, WITHOUT LONG-TERM CURRENT USE OF INSULIN (HCC): ICD-10-CM

## 2022-04-02 DIAGNOSIS — R11.2 CINV (CHEMOTHERAPY-INDUCED NAUSEA AND VOMITING): ICD-10-CM

## 2022-04-02 LAB
BASOPHILS # BLD AUTO: 0.07 THOUSANDS/ΜL (ref 0–0.1)
BASOPHILS NFR BLD AUTO: 1 % (ref 0–1)
BILIRUB UR QL STRIP: NEGATIVE
CLARITY UR: CLEAR
COLOR UR: NORMAL
CREAT UR-MCNC: 48.4 MG/DL
EOSINOPHIL # BLD AUTO: 0.2 THOUSAND/ΜL (ref 0–0.61)
EOSINOPHIL NFR BLD AUTO: 3 % (ref 0–6)
ERYTHROCYTE [DISTWIDTH] IN BLOOD BY AUTOMATED COUNT: 14.4 % (ref 11.6–15.1)
EST. AVERAGE GLUCOSE BLD GHB EST-MCNC: 137 MG/DL
GLUCOSE UR STRIP-MCNC: NEGATIVE MG/DL
HBA1C MFR BLD: 6.4 %
HCT VFR BLD AUTO: 38.4 % (ref 34.8–46.1)
HGB BLD-MCNC: 12.3 G/DL (ref 11.5–15.4)
HGB UR QL STRIP.AUTO: NEGATIVE
IMM GRANULOCYTES # BLD AUTO: 0.05 THOUSAND/UL (ref 0–0.2)
IMM GRANULOCYTES NFR BLD AUTO: 1 % (ref 0–2)
KETONES UR STRIP-MCNC: NEGATIVE MG/DL
LEUKOCYTE ESTERASE UR QL STRIP: NEGATIVE
LYMPHOCYTES # BLD AUTO: 0.98 THOUSANDS/ΜL (ref 0.6–4.47)
LYMPHOCYTES NFR BLD AUTO: 14 % (ref 14–44)
MCH RBC QN AUTO: 28.7 PG (ref 26.8–34.3)
MCHC RBC AUTO-ENTMCNC: 32 G/DL (ref 31.4–37.4)
MCV RBC AUTO: 90 FL (ref 82–98)
MICROALBUMIN UR-MCNC: 42.5 MG/L (ref 0–20)
MICROALBUMIN/CREAT 24H UR: 88 MG/G CREATININE (ref 0–30)
MONOCYTES # BLD AUTO: 0.47 THOUSAND/ΜL (ref 0.17–1.22)
MONOCYTES NFR BLD AUTO: 7 % (ref 4–12)
NEUTROPHILS # BLD AUTO: 5.23 THOUSANDS/ΜL (ref 1.85–7.62)
NEUTS SEG NFR BLD AUTO: 74 % (ref 43–75)
NITRITE UR QL STRIP: NEGATIVE
NRBC BLD AUTO-RTO: 0 /100 WBCS
PH UR STRIP.AUTO: 6 [PH]
PLATELET # BLD AUTO: 203 THOUSANDS/UL (ref 149–390)
PMV BLD AUTO: 10.5 FL (ref 8.9–12.7)
PROT UR STRIP-MCNC: NEGATIVE MG/DL
RBC # BLD AUTO: 4.29 MILLION/UL (ref 3.81–5.12)
SP GR UR STRIP.AUTO: 1.01 (ref 1–1.03)
UROBILINOGEN UR QL STRIP.AUTO: 0.2 E.U./DL
WBC # BLD AUTO: 7 THOUSAND/UL (ref 4.31–10.16)

## 2022-04-02 PROCEDURE — 82570 ASSAY OF URINE CREATININE: CPT

## 2022-04-02 PROCEDURE — 85025 COMPLETE CBC W/AUTO DIFF WBC: CPT

## 2022-04-02 PROCEDURE — 81003 URINALYSIS AUTO W/O SCOPE: CPT

## 2022-04-02 PROCEDURE — 83036 HEMOGLOBIN GLYCOSYLATED A1C: CPT

## 2022-04-02 PROCEDURE — 82043 UR ALBUMIN QUANTITATIVE: CPT

## 2022-04-02 PROCEDURE — 36415 COLL VENOUS BLD VENIPUNCTURE: CPT

## 2022-04-04 ENCOUNTER — HOSPITAL ENCOUNTER (OUTPATIENT)
Dept: INFUSION CENTER | Facility: CLINIC | Age: 72
Discharge: HOME | End: 2022-04-04
Payer: MEDICARE

## 2022-04-04 VITALS
HEIGHT: 64 IN | OXYGEN SATURATION: 100 % | SYSTOLIC BLOOD PRESSURE: 126 MMHG | WEIGHT: 169.31 LBS | RESPIRATION RATE: 17 BRPM | BODY MASS INDEX: 28.91 KG/M2 | TEMPERATURE: 97 F | DIASTOLIC BLOOD PRESSURE: 64 MMHG | HEART RATE: 60 BPM

## 2022-04-04 DIAGNOSIS — C34.12 MALIGNANT NEOPLASM OF UPPER LOBE OF LEFT LUNG (HCC): ICD-10-CM

## 2022-04-04 DIAGNOSIS — T45.1X5A CINV (CHEMOTHERAPY-INDUCED NAUSEA AND VOMITING): ICD-10-CM

## 2022-04-04 DIAGNOSIS — C34.92 ADENOCARCINOMA OF LEFT LUNG, STAGE 4 (HCC): ICD-10-CM

## 2022-04-04 DIAGNOSIS — C41.9 MALIGNANT NEOPLASM OF BONE WITH METASTASES (HCC): ICD-10-CM

## 2022-04-04 DIAGNOSIS — D70.1 CHEMOTHERAPY INDUCED NEUTROPENIA (HCC): ICD-10-CM

## 2022-04-04 DIAGNOSIS — E83.52 HYPERCALCEMIA: Primary | ICD-10-CM

## 2022-04-04 DIAGNOSIS — R11.2 CINV (CHEMOTHERAPY-INDUCED NAUSEA AND VOMITING): ICD-10-CM

## 2022-04-04 DIAGNOSIS — T45.1X5A CHEMOTHERAPY INDUCED NEUTROPENIA (HCC): ICD-10-CM

## 2022-04-04 PROCEDURE — 96409 CHEMO IV PUSH SNGL DRUG: CPT

## 2022-04-04 PROCEDURE — 96367 TX/PROPH/DG ADDL SEQ IV INF: CPT

## 2022-04-04 RX ORDER — SODIUM CHLORIDE 9 MG/ML
20 INJECTION, SOLUTION INTRAVENOUS ONCE
Status: COMPLETED | OUTPATIENT
Start: 2022-04-04 | End: 2022-04-04

## 2022-04-04 RX ADMIN — SODIUM CHLORIDE 20 ML/HR: 9 INJECTION, SOLUTION INTRAVENOUS at 13:15

## 2022-04-04 RX ADMIN — VINORELBINE 47 MG: 10 INJECTION, SOLUTION INTRAVENOUS at 13:38

## 2022-04-04 RX ADMIN — ONDANSETRON 8 MG: 2 INJECTION INTRAMUSCULAR; INTRAVENOUS at 13:04

## 2022-04-05 ENCOUNTER — OFFICE VISIT (OUTPATIENT)
Dept: FAMILY MEDICINE CLINIC | Facility: CLINIC | Age: 72
End: 2022-04-05
Payer: MEDICARE

## 2022-04-05 VITALS
DIASTOLIC BLOOD PRESSURE: 60 MMHG | OXYGEN SATURATION: 99 % | TEMPERATURE: 96.9 F | HEIGHT: 64 IN | HEART RATE: 66 BPM | BODY MASS INDEX: 29.12 KG/M2 | SYSTOLIC BLOOD PRESSURE: 116 MMHG | RESPIRATION RATE: 16 BRPM | WEIGHT: 170.6 LBS

## 2022-04-05 DIAGNOSIS — I10 PRIMARY HYPERTENSION: ICD-10-CM

## 2022-04-05 DIAGNOSIS — E11.9 CONTROLLED TYPE 2 DIABETES MELLITUS WITHOUT COMPLICATION, WITHOUT LONG-TERM CURRENT USE OF INSULIN (HCC): ICD-10-CM

## 2022-04-05 DIAGNOSIS — E78.2 MIXED HYPERLIPIDEMIA: ICD-10-CM

## 2022-04-05 DIAGNOSIS — Z00.00 MEDICARE ANNUAL WELLNESS VISIT, SUBSEQUENT: Primary | ICD-10-CM

## 2022-04-05 PROBLEM — Z09 HOSPITAL DISCHARGE FOLLOW-UP: Status: RESOLVED | Noted: 2022-01-27 | Resolved: 2022-04-05

## 2022-04-05 PROBLEM — R19.7 DIARRHEA: Status: RESOLVED | Noted: 2021-11-24 | Resolved: 2022-04-05

## 2022-04-05 PROCEDURE — G0439 PPPS, SUBSEQ VISIT: HCPCS | Performed by: FAMILY MEDICINE

## 2022-04-05 NOTE — PATIENT INSTRUCTIONS
Medicare Preventive Visit Patient Instructions  Thank you for completing your Welcome to Medicare Visit or Medicare Annual Wellness Visit today  Your next wellness visit will be due in one year (4/6/2023)  The screening/preventive services that you may require over the next 5-10 years are detailed below  Some tests may not apply to you based off risk factors and/or age  Screening tests ordered at today's visit but not completed yet may show as past due  Also, please note that scanned in results may not display below  Preventive Screenings:  Service Recommendations Previous Testing/Comments   Colorectal Cancer Screening  * Colonoscopy    * Fecal Occult Blood Test (FOBT)/Fecal Immunochemical Test (FIT)  * Fecal DNA/Cologuard Test  * Flexible Sigmoidoscopy Age: 54-65 years old   Colonoscopy: every 10 years (may be performed more frequently if at higher risk)  OR  FOBT/FIT: every 1 year  OR  Cologuard: every 3 years  OR  Sigmoidoscopy: every 5 years  Screening may be recommended earlier than age 48 if at higher risk for colorectal cancer  Also, an individualized decision between you and your healthcare provider will decide whether screening between the ages of 74-80 would be appropriate  Colonoscopy: 11/17/2004  FOBT/FIT: Not on file  Cologuard: Not on file  Sigmoidoscopy: Not on file    Screening Current     Breast Cancer Screening Age: 36 years old  Frequency: every 1-2 years  Not required if history of left and right mastectomy Mammogram: 02/29/2016        Cervical Cancer Screening Between the ages of 21-29, pap smear recommended once every 3 years  Between the ages of 33-67, can perform pap smear with HPV co-testing every 5 years     Recommendations may differ for women with a history of total hysterectomy, cervical cancer, or abnormal pap smears in past  Pap Smear: Not on file    Screening Not Indicated   Hepatitis C Screening Once for adults born between 1945 and 1965  More frequently in patients at high risk for Hepatitis C Hep C Antibody: 10/01/2019    Screening Current   Diabetes Screening 1-2 times per year if you're at risk for diabetes or have pre-diabetes Fasting glucose: 137 mg/dL   A1C: 6 4 %    Screening Not Indicated  History Diabetes   Cholesterol Screening Once every 5 years if you don't have a lipid disorder  May order more often based on risk factors  Lipid panel: 01/05/2022    Screening Not Indicated  History Lipid Disorder     Other Preventive Screenings Covered by Medicare:  1  Abdominal Aortic Aneurysm (AAA) Screening: covered once if your at risk  You're considered to be at risk if you have a family history of AAA  2  Lung Cancer Screening: covers low dose CT scan once per year if you meet all of the following conditions: (1) Age 50-69; (2) No signs or symptoms of lung cancer; (3) Current smoker or have quit smoking within the last 15 years; (4) You have a tobacco smoking history of at least 30 pack years (packs per day multiplied by number of years you smoked); (5) You get a written order from a healthcare provider  3  Glaucoma Screening: covered annually if you're considered high risk: (1) You have diabetes OR (2) Family history of glaucoma OR (3)  aged 48 and older OR (3)  American aged 72 and older  3  Osteoporosis Screening: covered every 2 years if you meet one of the following conditions: (1) You're estrogen deficient and at risk for osteoporosis based off medical history and other findings; (2) Have a vertebral abnormality; (3) On glucocorticoid therapy for more than 3 months; (4) Have primary hyperparathyroidism; (5) On osteoporosis medications and need to assess response to drug therapy  · Last bone density test (DXA Scan): 06/17/2015  5  HIV Screening: covered annually if you're between the age of 12-76  Also covered annually if you are younger than 13 and older than 72 with risk factors for HIV infection   For pregnant patients, it is covered up to 3 times per pregnancy  Immunizations:  Immunization Recommendations   Influenza Vaccine Annual influenza vaccination during flu season is recommended for all persons aged >= 6 months who do not have contraindications   Pneumococcal Vaccine (Prevnar and Pneumovax)  * Prevnar = PCV13  * Pneumovax = PPSV23   Adults 25-60 years old: 1-3 doses may be recommended based on certain risk factors  Adults 72 years old: Prevnar (PCV13) vaccine recommended followed by Pneumovax (PPSV23) vaccine  If already received PPSV23 since turning 65, then PCV13 recommended at least one year after PPSV23 dose  Hepatitis B Vaccine 3 dose series if at intermediate or high risk (ex: diabetes, end stage renal disease, liver disease)   Tetanus (Td) Vaccine - COST NOT COVERED BY MEDICARE PART B Following completion of primary series, a booster dose should be given every 10 years to maintain immunity against tetanus  Td may also be given as tetanus wound prophylaxis  Tdap Vaccine - COST NOT COVERED BY MEDICARE PART B Recommended at least once for all adults  For pregnant patients, recommended with each pregnancy  Shingles Vaccine (Shingrix) - COST NOT COVERED BY MEDICARE PART B  2 shot series recommended in those aged 48 and above     Health Maintenance Due:      Topic Date Due    Breast Cancer Screening: Mammogram  02/28/2017    Colorectal Cancer Screening  04/30/2028    Hepatitis C Screening  Completed     Immunizations Due:  There are no preventive care reminders to display for this patient  Advance Directives   What are advance directives? Advance directives are legal documents that state your wishes and plans for medical care  These plans are made ahead of time in case you lose your ability to make decisions for yourself  Advance directives can apply to any medical decision, such as the treatments you want, and if you want to donate organs  What are the types of advance directives?   There are many types of advance directives, and each state has rules about how to use them  You may choose a combination of any of the following:  · Living will: This is a written record of the treatment you want  You can also choose which treatments you do not want, which to limit, and which to stop at a certain time  This includes surgery, medicine, IV fluid, and tube feedings  · Durable power of  for healthcare [facility]): This is a written record that states who you want to make healthcare choices for you when you are unable to make them for yourself  This person, called a proxy, is usually a family member or a friend  You may choose more than 1 proxy  · Do not resuscitate (DNR) order:  A DNR order is used in case your heart stops beating or you stop breathing  It is a request not to have certain forms of treatment, such as CPR  A DNR order may be included in other types of advance directives  · Medical directive: This covers the care that you want if you are in a coma, near death, or unable to make decisions for yourself  You can list the treatments you want for each condition  Treatment may include pain medicine, surgery, blood transfusions, dialysis, IV or tube feedings, and a ventilator (breathing machine)  · Values history: This document has questions about your views, beliefs, and how you feel and think about life  This information can help others choose the care that you would choose  Why are advance directives important? An advance directive helps you control your care  Although spoken wishes may be used, it is better to have your wishes written down  Spoken wishes can be misunderstood, or not followed  Treatments may be given even if you do not want them  An advance directive may make it easier for your family to make difficult choices about your care  Urinary Incontinence   Urinary incontinence (UI)  is when you lose control of your bladder  UI develops because your bladder cannot store or empty urine properly   The 3 most common types of UI are stress incontinence, urge incontinence, or both  Medicines:   · May be given to help strengthen your bladder control  Report any side effects of medication to your healthcare provider  Do pelvic muscle exercises often:  Your pelvic muscles help you stop urinating  Squeeze these muscles tight for 5 seconds, then relax for 5 seconds  Gradually work up to squeezing for 10 seconds  Do 3 sets of 15 repetitions a day, or as directed  This will help strengthen your pelvic muscles and improve bladder control  Train your bladder:  Go to the bathroom at set times, such as every 2 hours, even if you do not feel the urge to go  You can also try to hold your urine when you feel the urge to go  For example, hold your urine for 5 minutes when you feel the urge to go  As that becomes easier, hold your urine for 10 minutes  Self-care:   · Keep a UI record  Write down how often you leak urine and how much you leak  Make a note of what you were doing when you leaked urine  · Drink liquids as directed  You may need to limit the amount of liquid you drink to help control your urine leakage  Do not drink any liquid right before you go to bed  Limit or do not have drinks that contain caffeine or alcohol  · Prevent constipation  Eat a variety of high-fiber foods  Good examples are high-fiber cereals, beans, vegetables, and whole-grain breads  Walking is the best way to trigger your intestines to have a bowel movement  · Exercise regularly and maintain a healthy weight  Weight loss and exercise will decrease pressure on your bladder and help you control your leakage  · Use a catheter as directed  to help empty your bladder  A catheter is a tiny, plastic tube that is put into your bladder to drain your urine  · Go to behavior therapy as directed  Behavior therapy may be used to help you learn to control your urge to urinate      Weight Management   Why it is important to manage your weight:  Being overweight increases your risk of health conditions such as heart disease, high blood pressure, type 2 diabetes, and certain types of cancer  It can also increase your risk for osteoarthritis, sleep apnea, and other respiratory problems  Aim for a slow, steady weight loss  Even a small amount of weight loss can lower your risk of health problems  How to lose weight safely:  A safe and healthy way to lose weight is to eat fewer calories and get regular exercise  You can lose up about 1 pound a week by decreasing the number of calories you eat by 500 calories each day  Healthy meal plan for weight management:  A healthy meal plan includes a variety of foods, contains fewer calories, and helps you stay healthy  A healthy meal plan includes the following:  · Eat whole-grain foods more often  A healthy meal plan should contain fiber  Fiber is the part of grains, fruits, and vegetables that is not broken down by your body  Whole-grain foods are healthy and provide extra fiber in your diet  Some examples of whole-grain foods are whole-wheat breads and pastas, oatmeal, brown rice, and bulgur  · Eat a variety of vegetables every day  Include dark, leafy greens such as spinach, kale, gabriela greens, and mustard greens  Eat yellow and orange vegetables such as carrots, sweet potatoes, and winter squash  · Eat a variety of fruits every day  Choose fresh or canned fruit (canned in its own juice or light syrup) instead of juice  Fruit juice has very little or no fiber  · Eat low-fat dairy foods  Drink fat-free (skim) milk or 1% milk  Eat fat-free yogurt and low-fat cottage cheese  Try low-fat cheeses such as mozzarella and other reduced-fat cheeses  · Choose meat and other protein foods that are low in fat  Choose beans or other legumes such as split peas or lentils  Choose fish, skinless poultry (chicken or turkey), or lean cuts of red meat (beef or pork)  Before you cook meat or poultry, cut off any visible fat     · Use less fat and oil  Try baking foods instead of frying them  Add less fat, such as margarine, sour cream, regular salad dressing and mayonnaise to foods  Eat fewer high-fat foods  Some examples of high-fat foods include french fries, doughnuts, ice cream, and cakes  · Eat fewer sweets  Limit foods and drinks that are high in sugar  This includes candy, cookies, regular soda, and sweetened drinks  Exercise:  Exercise at least 30 minutes per day on most days of the week  Some examples of exercise include walking, biking, dancing, and swimming  You can also fit in more physical activity by taking the stairs instead of the elevator or parking farther away from stores  Ask your healthcare provider about the best exercise plan for you  Narcotic (Opioid) Safety    Use narcotics safely:  · Take prescribed narcotics exactly as directed  · Do not give narcotics to others or take narcotics that belong to someone else  · Do not mix narcotics without medicines or alcohol  · Do not drive or operate heavy machinery after you take the narcotic  · Monitor for side effects and notify your healthcare provider if you experienced side effects such as nausea, sleepiness, itching, or trouble thinking clearly  Manage constipation:    Constipation is the most common side effect of narcotic medicine  Constipation is when you have hard, dry bowel movements, or you go longer than usual between bowel movements  Tell your healthcare provider about all changes in your bowel movements while you are taking narcotics  He or she may recommend laxative medicine to help you have a bowel movement  He or she may also change the kind of narcotic you are taking, or change when you take it  The following are more ways you can prevent or relieve constipation:    · Drink liquids as directed  You may need to drink extra liquids to help soften and move your bowels  Ask how much liquid to drink each day and which liquids are best for you    · Eat high-fiber foods   This may help decrease constipation by adding bulk to your bowel movements  High-fiber foods include fruits, vegetables, whole-grain breads and cereals, and beans  Your healthcare provider or dietitian can help you create a high-fiber meal plan  Your provider may also recommend a fiber supplement if you cannot get enough fiber from food  · Exercise regularly  Regular physical activity can help stimulate your intestines  Walking is a good exercise to prevent or relieve constipation  Ask which exercises are best for you  · Schedule a time each day to have a bowel movement  This may help train your body to have regular bowel movements  Bend forward while you are on the toilet to help move the bowel movement out  Sit on the toilet for at least 10 minutes, even if you do not have a bowel movement  Store narcotics safely:   · Store narcotics where others cannot easily get them  Keep them in a locked cabinet or secure area  Do not  keep them in a purse or other bag you carry with you  A person may be looking for something else and find the narcotics  · Make sure narcotics are stored out of the reach of children  A child can easily overdose on narcotics  Narcotics may look like candy to a small child  The best way to dispose of narcotics: The laws vary by country and area  In the [place], the best way is to return the narcotics through a take-back program  This program is offered by the General [employer] (GRIS)  The following are options for using the program:  · Take the narcotics to a GRIS collection site  The site is often a law enforcement center  Call your local law enforcement center for scheduled take-back days in your area  You will be given information on where to go if the collection site is in a different location  · Take the narcotics to an approved pharmacy or hospital   A pharmacy or hospital may be set up as a collection site   You will need to ask if it is a GRIS collection site if you were not directed there  A pharmacy or doctor's office may not be able to take back narcotics unless it is a GRIS site  · Use a mail-back system  This means you are given containers to put the narcotics into  You will then mail them in the containers  · Use a take-back drop box  This is a place to leave the narcotics at any time  People and animals will not be able to get into the box  Your local law enforcement agency can tell you where to find a drop box in your area  Other ways to manage pain:   · Ask your healthcare provider about non-narcotic medicines to control pain  Nonprescription medicines include NSAIDs (such as ibuprofen) and acetaminophen  Prescription medicines include muscle relaxers, antidepressants, and steroids  · Pain may be managed without any medicines  Some ways to relieve pain include massage, aromatherapy, or meditation  Physical or occupational therapy may also help  For more information:   · Drug Enforcement Administration  [address] [place] [address]  Mary Villalobos[address]  Phone: 3- 339 - [phone]  Web Address: [facility]/drug_[email or web address]    · [address]  and Drug Administration  [facility] , [address] [facility]  Phone: 7- 028 - [phone]  Web Address: http://[employer]/     © Copyright [employer] 2018 Information is for End User's use only and may not be sold, redistributed or otherwise used for commercial purposes   All illustrations and images included in CareNotes® are the copyrighted property of [employer]  or [address] [place] [address]

## 2022-04-05 NOTE — PROGRESS NOTES
Assessment and Plan:     Problem List Items Addressed This Visit        Endocrine    Diabetes type 2, controlled ([facility] [address] )     Stable on current meds  Lab Results   Component Value Date    HGBA1C 6 4 (H) 04/02/2022            Relevant Orders    Hemoglobin A1C       Cardiovascular and Mediastinum    Primary hypertension     Stable on current meds         Relevant Orders    TSH, 3rd generation with Free T4 reflex       Other    Hyperlipidemia    Relevant Orders    Lipid Panel with Direct LDL reflex    TSH, 3rd generation with Free T4 reflex    Medicare annual wellness visit, subsequent - Primary     Up to date                Preventive health issues were discussed with patient, and age appropriate screening tests were ordered as noted in patient's After Visit Summary  Personalized health advice and appropriate referrals for health education or preventive services given if needed, as noted in patient's After Visit Summary  History of Present Illness:     Patient presents for Medicare Annual Wellness visit    Patient Care Team:  Karin Metzger DO as PCP - General (Family Medicine)  MD All Lord MD Carollee Chol, MD Sharlon Chamber, MD Magdalene Bailiff, MD Karolynn Magic, DO Consepcion Orion, CRNP Venancio Milan, MD  [facility] DO Blue Soto MD as Consulting Physician (Radiation Oncology)  DO Karan Joshi RN as RN Care Manager (Care Coordination)     Problem List:     Patient Active Problem List   Diagnosis    Paroxysmal atrial fibrillation ([facility] [address] )    Primary hypertension    Obesity (BMI 30-39  9)    Skin rash    Adenocarcinoma of lung, stage 4 (HCC)    Chronic diastolic heart failure (HCC)    Cancer related pain    Diabetes mellitus type 2, uncomplicated (HCC)    Malignant neoplasm of bone with metastases (HCC)    Acid reflux    Hyperlipidemia    Insomnia    Major depressive disorder with single episode, in full remission ([place] [address] )    Vitamin D deficiency    Adhesive capsulitis of shoulder    Osteoarthritis of knee    Anxiety    Lung nodule, multiple    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)    Other fatigue    CINV (chemotherapy-induced nausea and vomiting)    Cyst of pancreas    Caregiver stress    Localized swelling of left foot    Pneumonitis    Anemia in stage 3 chronic kidney disease (HCC)    Palliative care patient    Stage 3b [facility]  )    Persistent proteinuria    Hypercalcemia    Medicare annual wellness visit, subsequent    Iron deficiency anemia    Chemotherapy induced neutropenia ([place] [address] )    Poor venous access    Former smoker    Lung cancer ([place] [address] )    Diabetes type 2, controlled ([place] [address] )    Depression    Hypokalemia    Hypomagnesemia    Ground glass opacity present on imaging of lung    History of stroke    GERD (gastroesophageal reflux disease)    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[place] [address] )    Dyslipidemia    Lethargy    Bilateral leg edema    Generalized muscle weakness    Cognitive decline      Past Medical and Surgical History:     Past Medical History:   Diagnosis Date    A-fib ([place] [address] )     Arthritis     Atrial fibrillation ([place] [address] )     Confusion     Diabetes mellitus ([place] [address] )     Diabetes mellitus type 2, uncomplicated ([place] [address] )     Last assessed: 8/17/17    Encephalopathy 1/6/2022    Essential hypertension     Frozen shoulder     L shoulder    GERD (gastroesophageal reflux disease)     Hx of cancer of uterus     Last assessed: 8/21/15    Hyperlipidemia     Hypertension     Hyponatremia 11/16/2016    Lung mass     diagnosed 9/2016    Malignant neoplasm without specification of site ([place] [address] )     Skin cancer, basal cell     right eye area    Stage 4 lung cancer ([place] [address] )      Past Surgical History:   Procedure Laterality Date    APPENDECTOMY      BRONCHOSCOPY N/A 11/17/2016    Procedure: BRONCHOSCOPY FLEXIBLE;  Surgeon: Jose Iverson MD; [facility];  Service:     CHOLECYSTECTOMY      COLONOSCOPY      COLONOSCOPY      FL GUIDED CENTRAL VENOUS ACCESS DEVICE INSERTION  12/14/2021    GALLBLADDER SURGERY      HYSTERECTOMY  2000    Total abdominal    JOINT REPLACEMENT      LARYNGOSCOPY      Flexible Fiberoptic, (Therapeutic), Resolved: 11/17/16    MOHS SURGERY      Micrographic Surgery Face    OK BRONCHOSCOPY NEEDLE BX TRACHEA MAIN STEM&/BRON N/A 11/17/2016    Procedure: EBUS; FROZEN SECTION ;  Surgeon: Becky Bennett MD;  [facility];  Service: Thoracic    [place] [address] N/A 5/24/2017    Procedure: Yamile Meredith;  Surgeon: Michaela Carmona MD;  Location[facility]; Service: Pulmonary    OK INSJ TUNNELED CTR VAD W/SUBQ PORT AGE 5 YR/> N/A 12/14/2021    Procedure: INSERTION VENOUS PORT ( PORT-A-CATH) IR;  Surgeon: Amanda Mckeon DO;  [facility];  Service: Interventional Radiology    TONSILECTOMY AND ADNOIDECTOMY      TONSILLECTOMY      TOTAL KNEE ARTHROPLASTY Right 09/23/2014      Family History:     Family History   Problem Relation Age of Onset    Heart disease Father         cardiac disorder    Hypertension Father     Arthritis Father     Stroke Father         cerebrovascular accident    Skin cancer Father     Diabetes Mother     Heart disease Mother    [place] Dementia Mother     Hypertension Mother     Thyroid disease Mother     Cancer Maternal Grandfather         of unknown origin    Cancer Family     Depression Family     Diabetes Family     Hyperlipidemia Family         essential    Heart disease Family     Hypertension Family     Stroke Family         syndrome    Lung cancer Paternal Uncle     Muscular dystrophy Brother       Social History:     Social History     Socioeconomic History    Marital status:       Spouse name: None    Number of children: None    Years of education: None    Highest education level: None   Occupational History    Occupation: retired   Tobacco Use    Smoking status: Never Smoker    Smokeless tobacco: Never Used    Tobacco comment: Quit in 2000   Vaping Use    Vaping Use: Never used   Substance and Sexual Activity    Alcohol use: No    Drug use: No    Sexual activity: Not Currently   Other Topics Concern    None   Social History Narrative    ** Merged History Encounter **         Family dynamics: Supportive  Relationship status:     Children and Ages:1 adult dtr [place], 1 adult son Cornel Kay  Other important family information: Caregiver for her brother Chele Arellano, who is disabled  Living situation: Lives with brother        Employm    ent history/source of income: Worked as a  for [employer] prior to correction   Spirituality/ Rastafarian: Holiness    Patient's strengths, social supports, and resources: Supportive family  Hobbies/Interests: Sewing; Crafting  [employer]    e: States dtr [place] is her POA       Social Determinants of Health     Financial Resource Strain: Not on file   Food Insecurity: Not on file   Transportation Needs: Not on file   Physical Activity: Not on file   Stress: Not on file   Social Connections: Not on file   Intimate Partner Violence: Not on file   Housing Stability: Not on file      Medications and Allergies:     Current Outpatient Medications   Medication Sig Dispense Refill    acetaminophen (TYLENOL) 325 mg tablet Take 650 mg by mouth every 6 (six) hours as needed for mild pain      apixaban (Eliquis) 5 mg TAKE 1 TABLET TWICE A  tablet 3    aspirin (ECOTRIN LOW STRENGTH) 81 mg EC tablet Take 1 tablet (81 mg total) by mouth daily 30 tablet 0    atorvastatin (LIPITOR) 40 mg tablet TAKE 1 TABLET BY MOUTH EVERY DAY 30 tablet 1    busPIRone (BUSPAR) 7 5 mg tablet Take 1 tablet (7 5 mg total) by mouth 2 (two) times a day 60 tablet 2    cyanocobalamin (VITAMIN B-12) 100 mcg tablet Take by mouth daily      folic acid (FOLVITE) 1 mg tablet Take 1 tablet (1 mg total) by mouth daily 90 tablet 1    furosemide (LASIX) 20 mg tablet TAKE 1 TABLET (20 MG TOTAL) BY MOUTH DAILY AS NEEDED (LEG EDEMA) 30 tablet 0    Klor-Con M10 10 MEQ tablet TAKE 1 TABLET BY MOUTH EVERY DAY 30 tablet 5    linaGLIPtin (Tradjenta) 5 MG TABS Take 5 mg by mouth daily 90 tablet 3    loperamide (IMODIUM) 2 mg capsule Take 2 mg by mouth 4 (four) times a day as needed for diarrhea        loratadine (CLARITIN) 10 mg tablet Take 10 mg by mouth as needed        metFORMIN (GLUCOPHAGE) 1000 MG tablet Take 0 5 tablets (500 mg total) by mouth 2 (two) times a day with meals 180 tablet 3    metoprolol tartrate (LOPRESSOR) 25 mg tablet Take 1 tablet (25 mg total) by mouth every 12 (twelve) hours 180 tablet 3    omeprazole (PriLOSEC) 20 mg delayed release capsule Take 1 capsule (20 mg total) by mouth daily 90 capsule 3    oxyCODONE (Roxicodone) 5 immediate release tablet Take 1 tablet (5 mg total) by mouth every 6 (six) hours as needed for moderate pain or severe pain Max Daily Amount: 20 mg 30 tablet 0    sotalol (BETAPACE) 80 mg tablet Take 1 tablet (80 mg total) by mouth 2 (two) times a day 180 tablet 3    valsartan (DIOVAN) 160 mg tablet Take 1 tablet (160 mg total) by mouth 2 (two) times a day 60 tablet 3    venlafaxine (EFFEXOR) 37 5 mg tablet Take 1 tablet (37 5 mg total) by mouth daily 90 tablet 3     No current facility-administered medications for this visit       Allergies   Allergen Reactions    Amoxicillin Hives    Amoxicillin Rash and Hives    Cardizem [Diltiazem] Rash     Rash      Statins Myalgia     Severe muscle aching  Terrible pains    Zofran [Ondansetron] Palpitations      Immunizations:     Immunization History   Administered Date(s) Administered    COVID-19 PFIZER VACCINE 0 3 ML IM 02/20/2021, 03/12/2021, 11/22/2021    H1N1, All Formulations 11/05/2009    INFLUENZA 10/27/2014, 09/21/2015, 10/19/2016, 10/02/2017, 10/10/2018, 09/26/2019, 08/26/2020, 12/09/2021    Influenza, seasonal, injectable 10/27/2014, 10/19/2016, 10/02/2017    Pneumococcal Conjugate 13-Valent 12/03/2015    Pneumococcal Polysaccharide PPV23 09/26/2014    Tdap 10/06/2018    Zoster 10/03/2013    influenza, trivalent, adjuvanted 08/26/2019      Health Maintenance:         Topic Date Due    Breast Cancer Screening: Mammogram  02/28/2017    Colorectal Cancer Screening  04/30/2028    Hepatitis C Screening  Completed     There are no preventive care reminders to display for this patient  Medicare Health Risk Assessment:     /60 (BP Location: Left arm, Patient Position: Sitting, Cuff Size: Standard)   Pulse 66   Temp (!) 96 9 °F (36 1 °C)   Resp 16   Ht 5' 4" (1 626 m)   Wt 77 4 kg (170 lb 9 6 oz)   LMP  (LMP Unknown)   SpO2 99%   BMI 29 28 kg/m²      Monica is here for her Subsequent Wellness visit  Health Risk Assessment:   Patient rates overall health as good  Patient feels that their physical health rating is same  Patient is very satisfied with their life  Eyesight was rated as slightly worse  Hearing was rated as same  Patient feels that their emotional and mental health rating is same  Patients states they are never, rarely angry  Patient states they are sometimes unusually tired/fatigued  Pain experienced in the last 7 days has been some  Patient's pain rating has been 8/10  Patient states that she has experienced no weight loss or gain in last 6 months  Depression Screening:   PHQ-9 Score: 0      Fall Risk Screening: In the past year, patient has experienced: no history of falling in past year      Urinary Incontinence Screening:   Patient has leaked urine accidently in the last six months  Home Safety:  Patient does not have trouble with stairs inside or outside of their home  Patient has working smoke alarms and has working carbon monoxide detector  Home safety hazards include: none  Nutrition:   Current diet is Regular and Limited junk food       Medications:   Patient is currently taking over-the-counter supplements  OTC medications include: see medication list  Patient is not able to manage medications  Activities of Daily Living (ADLs)/Instrumental Activities of Daily Living (IADLs):   Walk and transfer into and out of bed and chair?: Yes  Dress and groom yourself?: Yes    Bathe or shower yourself?: Yes    Feed yourself? Yes  Do your laundry/housekeeping?: Yes  Manage your money, pay your bills and track your expenses?: No  Make your own meals?: No    Do your own shopping?: No    Previous Hospitalizations:   Any hospitalizations or ED visits within the last 12 months?: Yes    How many hospitalizations have you had in the last year?: 1-2    Cognitive Screening:   Provider or family/friend/caregiver concerned regarding cognition?: No    PREVENTIVE SCREENINGS      Cardiovascular Screening:    General: Screening Not Indicated and History Lipid Disorder      Diabetes Screening:     General: Screening Not Indicated and History Diabetes      Colorectal Cancer Screening:     General: Screening Current      Breast Cancer Screening:     General: Risks and Benefits Discussed      Cervical Cancer Screening:    General: Screening Not Indicated      Abdominal Aortic Aneurysm (AAA) Screening:        General: Screening Not Indicated      Lung Cancer Screening:     General: Screening Not Indicated and History Lung Cancer      Hepatitis C Screening:    General: Screening Current    Screening, Brief Intervention, and Referral to Treatment (SBIRT)    Screening  Typical number of drinks in a day: 0  Typical number of drinks in a week: 0  Interpretation: Low risk drinking behavior      Single Item Drug Screening:  How often have you used an illegal drug (including marijuana) or a prescription medication for non-medical reasons in the past year? never    Single Item Drug Screen Score: 0  Interpretation: Negative screen for possible drug use disorder    Review of Current Opioid Use    Opioid Risk Tool (ORT) Interpretation: Complete Opioid Risk Tool (ORT)      Sonido Bhatti DO

## 2022-04-08 ENCOUNTER — TELEPHONE (OUTPATIENT)
Dept: HEMATOLOGY ONCOLOGY | Facility: CLINIC | Age: 72
End: 2022-04-08

## 2022-04-08 DIAGNOSIS — Z95.828 PORT-A-CATH IN PLACE: Primary | ICD-10-CM

## 2022-04-08 RX ORDER — LIDOCAINE AND PRILOCAINE 25; 25 MG/G; MG/G
CREAM TOPICAL
Qty: 30 G | Refills: 1 | Status: SHIPPED | OUTPATIENT
Start: 2022-04-08

## 2022-04-08 NOTE — TELEPHONE ENCOUNTER
CALL RETURN FORM   Reason for patient call? Patient calling in requesting numbing cream  Medication for her port site  Patient stating while she was at the infusion center she was had a lot of pain and was told that they would call to the office to request numbing cream medication   Patient's primary oncologist? Tati Shah   Name of person the patient was calling for? Nurse     Any additional information to add, if applicable? Best call back number [phone]   Informed patient that the message will be forwarded to the team and someone will get back to them as soon as possible    Did you relay this information to the patient?  Yes

## 2022-04-11 ENCOUNTER — PATIENT OUTREACH (OUTPATIENT)
Dept: FAMILY MEDICINE CLINIC | Facility: CLINIC | Age: 72
End: 2022-04-11

## 2022-04-11 NOTE — PROGRESS NOTES
Final call of bundled episode  Left my name, number if future complex care management assistance needed

## 2022-04-14 ENCOUNTER — TELEPHONE (OUTPATIENT)
Dept: LAB | Facility: HOSPITAL | Age: 72
End: 2022-04-14

## 2022-04-16 ENCOUNTER — APPOINTMENT (OUTPATIENT)
Dept: LAB | Facility: HOSPITAL | Age: 72
End: 2022-04-16
Attending: STUDENT IN AN ORGANIZED HEALTH CARE EDUCATION/TRAINING PROGRAM
Payer: MEDICARE

## 2022-04-16 DIAGNOSIS — K86.2 CYST OF PANCREAS: ICD-10-CM

## 2022-04-16 LAB
BUN SERPL-MCNC: 34 MG/DL (ref 5–25)
CREAT SERPL-MCNC: 1.59 MG/DL (ref 0.6–1.3)
GFR SERPL CREATININE-BSD FRML MDRD: 31 ML/MIN/1.73SQ M

## 2022-04-16 PROCEDURE — 82565 ASSAY OF CREATININE: CPT

## 2022-04-16 PROCEDURE — 36415 COLL VENOUS BLD VENIPUNCTURE: CPT

## 2022-04-16 PROCEDURE — 84520 ASSAY OF UREA NITROGEN: CPT

## 2022-04-18 ENCOUNTER — HOSPITAL ENCOUNTER (OUTPATIENT)
Dept: CT IMAGING | Facility: HOSPITAL | Age: 72
Discharge: HOME | End: 2022-04-18
Payer: MEDICARE

## 2022-04-18 ENCOUNTER — HOSPITAL ENCOUNTER (OUTPATIENT)
Dept: INFUSION CENTER | Facility: CLINIC | Age: 72
Discharge: HOME | End: 2022-04-18
Payer: MEDICARE

## 2022-04-18 ENCOUNTER — TELEPHONE (OUTPATIENT)
Dept: OTHER | Facility: OTHER | Age: 72
End: 2022-04-18

## 2022-04-18 ENCOUNTER — HOSPITAL ENCOUNTER (OUTPATIENT)
Dept: MRI IMAGING | Facility: HOSPITAL | Age: 72
Discharge: HOME | End: 2022-04-18
Attending: STUDENT IN AN ORGANIZED HEALTH CARE EDUCATION/TRAINING PROGRAM
Payer: MEDICARE

## 2022-04-18 VITALS
TEMPERATURE: 96.9 F | HEIGHT: 64 IN | HEART RATE: 64 BPM | OXYGEN SATURATION: 98 % | RESPIRATION RATE: 18 BRPM | DIASTOLIC BLOOD PRESSURE: 60 MMHG | BODY MASS INDEX: 29.37 KG/M2 | SYSTOLIC BLOOD PRESSURE: 114 MMHG | WEIGHT: 172 LBS

## 2022-04-18 DIAGNOSIS — E83.52 HYPERCALCEMIA: Primary | ICD-10-CM

## 2022-04-18 DIAGNOSIS — D70.1 CHEMOTHERAPY INDUCED NEUTROPENIA (HCC): ICD-10-CM

## 2022-04-18 DIAGNOSIS — T45.1X5A CINV (CHEMOTHERAPY-INDUCED NAUSEA AND VOMITING): ICD-10-CM

## 2022-04-18 DIAGNOSIS — C34.90 MALIGNANT NEOPLASM OF LUNG, UNSPECIFIED LATERALITY, UNSPECIFIED PART OF LUNG (HCC): ICD-10-CM

## 2022-04-18 DIAGNOSIS — T45.1X5A CHEMOTHERAPY INDUCED NEUTROPENIA (HCC): ICD-10-CM

## 2022-04-18 DIAGNOSIS — C34.12 MALIGNANT NEOPLASM OF UPPER LOBE OF LEFT LUNG (HCC): ICD-10-CM

## 2022-04-18 DIAGNOSIS — R11.2 CINV (CHEMOTHERAPY-INDUCED NAUSEA AND VOMITING): ICD-10-CM

## 2022-04-18 DIAGNOSIS — C41.9 MALIGNANT NEOPLASM OF BONE WITH METASTASES (HCC): ICD-10-CM

## 2022-04-18 DIAGNOSIS — C34.92 ADENOCARCINOMA OF LEFT LUNG, STAGE 4 (HCC): ICD-10-CM

## 2022-04-18 DIAGNOSIS — K86.2 CYST OF PANCREAS: ICD-10-CM

## 2022-04-18 LAB
ALBUMIN SERPL BCP-MCNC: 2.8 G/DL (ref 3.5–5)
ALP SERPL-CCNC: 87 U/L (ref 46–116)
ALT SERPL W P-5'-P-CCNC: 23 U/L (ref 12–78)
ANION GAP SERPL CALCULATED.3IONS-SCNC: 8 MMOL/L (ref 4–13)
AST SERPL W P-5'-P-CCNC: 20 U/L (ref 5–45)
BASOPHILS # BLD AUTO: 0.06 THOUSANDS/ΜL (ref 0–0.1)
BASOPHILS NFR BLD AUTO: 1 % (ref 0–1)
BILIRUB SERPL-MCNC: 0.42 MG/DL (ref 0.2–1)
BUN SERPL-MCNC: 33 MG/DL (ref 5–25)
CALCIUM ALBUM COR SERPL-MCNC: 9.6 MG/DL (ref 8.3–10.1)
CALCIUM SERPL-MCNC: 8.6 MG/DL (ref 8.3–10.1)
CHLORIDE SERPL-SCNC: 105 MMOL/L (ref 100–108)
CO2 SERPL-SCNC: 24 MMOL/L (ref 21–32)
CREAT SERPL-MCNC: 1.62 MG/DL (ref 0.6–1.3)
EOSINOPHIL # BLD AUTO: 0.12 THOUSAND/ΜL (ref 0–0.61)
EOSINOPHIL NFR BLD AUTO: 2 % (ref 0–6)
ERYTHROCYTE [DISTWIDTH] IN BLOOD BY AUTOMATED COUNT: 14.7 % (ref 11.6–15.1)
GFR SERPL CREATININE-BSD FRML MDRD: 31 ML/MIN/1.73SQ M
GLUCOSE P FAST SERPL-MCNC: 97 MG/DL (ref 65–99)
GLUCOSE SERPL-MCNC: 97 MG/DL (ref 65–140)
HCT VFR BLD AUTO: 33.8 % (ref 34.8–46.1)
HGB BLD-MCNC: 10.8 G/DL (ref 11.5–15.4)
IMM GRANULOCYTES # BLD AUTO: 0.03 THOUSAND/UL (ref 0–0.2)
IMM GRANULOCYTES NFR BLD AUTO: 1 % (ref 0–2)
LYMPHOCYTES # BLD AUTO: 0.94 THOUSANDS/ΜL (ref 0.6–4.47)
LYMPHOCYTES NFR BLD AUTO: 18 % (ref 14–44)
MCH RBC QN AUTO: 28.7 PG (ref 26.8–34.3)
MCHC RBC AUTO-ENTMCNC: 32 G/DL (ref 31.4–37.4)
MCV RBC AUTO: 90 FL (ref 82–98)
MONOCYTES # BLD AUTO: 0.55 THOUSAND/ΜL (ref 0.17–1.22)
MONOCYTES NFR BLD AUTO: 11 % (ref 4–12)
NEUTROPHILS # BLD AUTO: 3.48 THOUSANDS/ΜL (ref 1.85–7.62)
NEUTS SEG NFR BLD AUTO: 67 % (ref 43–75)
NRBC BLD AUTO-RTO: 0 /100 WBCS
PLATELET # BLD AUTO: 158 THOUSANDS/UL (ref 149–390)
PMV BLD AUTO: 10.6 FL (ref 8.9–12.7)
POTASSIUM SERPL-SCNC: 4.6 MMOL/L (ref 3.5–5.3)
PROT SERPL-MCNC: 6 G/DL (ref 6.4–8.2)
RBC # BLD AUTO: 3.76 MILLION/UL (ref 3.81–5.12)
SODIUM SERPL-SCNC: 137 MMOL/L (ref 136–145)
WBC # BLD AUTO: 5.18 THOUSAND/UL (ref 4.31–10.16)

## 2022-04-18 PROCEDURE — 76376 3D RENDER W/INTRP POSTPROCES: CPT

## 2022-04-18 PROCEDURE — 74183 MRI ABD W/O CNTR FLWD CNTR: CPT

## 2022-04-18 PROCEDURE — 96367 TX/PROPH/DG ADDL SEQ IV INF: CPT

## 2022-04-18 PROCEDURE — A9585 GADOBUTROL INJECTION: HCPCS | Performed by: STUDENT IN AN ORGANIZED HEALTH CARE EDUCATION/TRAINING PROGRAM

## 2022-04-18 PROCEDURE — G1004 CDSM NDSC: HCPCS

## 2022-04-18 PROCEDURE — 80053 COMPREHEN METABOLIC PANEL: CPT | Performed by: INTERNAL MEDICINE

## 2022-04-18 PROCEDURE — 71260 CT THORAX DX C+: CPT

## 2022-04-18 PROCEDURE — 96409 CHEMO IV PUSH SNGL DRUG: CPT

## 2022-04-18 PROCEDURE — 85025 COMPLETE CBC W/AUTO DIFF WBC: CPT | Performed by: INTERNAL MEDICINE

## 2022-04-18 RX ORDER — SODIUM CHLORIDE 9 MG/ML
20 INJECTION, SOLUTION INTRAVENOUS ONCE
Status: COMPLETED | OUTPATIENT
Start: 2022-04-18 | End: 2022-04-18

## 2022-04-18 RX ADMIN — VINORELBINE 47 MG: 10 INJECTION, SOLUTION INTRAVENOUS at 13:42

## 2022-04-18 RX ADMIN — ONDANSETRON 8 MG: 2 INJECTION INTRAMUSCULAR; INTRAVENOUS at 13:03

## 2022-04-18 RX ADMIN — GADOBUTROL 7 ML: 604.72 INJECTION INTRAVENOUS at 10:26

## 2022-04-18 RX ADMIN — IODIXANOL 85 ML: 320 INJECTION, SOLUTION INTRAVASCULAR at 10:48

## 2022-04-18 RX ADMIN — SODIUM CHLORIDE 20 ML/HR: 0.9 INJECTION, SOLUTION INTRAVENOUS at 12:50

## 2022-04-18 NOTE — TELEPHONE ENCOUNTER
Pt called, requesting a call back from on call provider, regarding an episode of emesis   Provider was paged via [facility]

## 2022-04-18 NOTE — NURSING NOTE
Patient's port accessed for MRI and CT scan  Patient brought to Infusion by Radiology RN following her scans for her scheduled treatment

## 2022-04-18 NOTE — PROGRESS NOTES
Patient to infusion for Navel[place]ine  She offers no complaints  Port already accessed as pt was in CT scan prior to her infusion  Port flushed good blood return noted  CBC and CMP drawn, waiting for results prior to proceeding with treatment  Will continue to monitor

## 2022-04-19 ENCOUNTER — TELEPHONE (OUTPATIENT)
Dept: HEMATOLOGY ONCOLOGY | Facility: CLINIC | Age: 72
End: 2022-04-19

## 2022-04-19 DIAGNOSIS — T46.6X5A MYALGIA DUE TO STATIN: ICD-10-CM

## 2022-04-19 DIAGNOSIS — R60.0 BILATERAL LEG EDEMA: ICD-10-CM

## 2022-04-19 DIAGNOSIS — M79.10 MYALGIA DUE TO STATIN: ICD-10-CM

## 2022-04-19 RX ORDER — FUROSEMIDE 20 MG/1
20 TABLET ORAL DAILY PRN
Qty: 30 TABLET | Refills: 0 | Status: SHIPPED | OUTPATIENT
Start: 2022-04-19 | End: 2022-05-19

## 2022-04-19 RX ORDER — ATORVASTATIN CALCIUM 40 MG/1
TABLET, FILM COATED ORAL
Qty: 30 TABLET | Refills: 1 | Status: SHIPPED | OUTPATIENT
Start: 2022-04-19 | End: 2022-05-19

## 2022-04-19 NOTE — TELEPHONE ENCOUNTER
CALL RETURN FORM   Reason for patient call? Patient wants to confirm that she is scheduled for [facility] for her upcoming appointments     Patient's primary oncologist? [address]     Name of person the patient was calling for?  Terell Miller   Any additional information to add, if applicable? n/a   Informed patient that the message will be forwarded to the team and someone will get back to them as soon as possible    Did you relay this information to the patient?  yes, Patient can be reached back at [phone]

## 2022-04-20 ENCOUNTER — TELEPHONE (OUTPATIENT)
Dept: HEMATOLOGY ONCOLOGY | Facility: CLINIC | Age: 72
End: 2022-04-20

## 2022-04-22 ENCOUNTER — TELEPHONE (OUTPATIENT)
Dept: SURGICAL ONCOLOGY | Facility: CLINIC | Age: 72
End: 2022-04-22

## 2022-04-22 DIAGNOSIS — E11.9 CONTROLLED TYPE 2 DIABETES MELLITUS WITHOUT COMPLICATION, WITHOUT LONG-TERM CURRENT USE OF INSULIN (HCC): ICD-10-CM

## 2022-04-22 RX ORDER — LINAGLIPTIN 5 MG/1
5 TABLET, FILM COATED ORAL DAILY
Qty: 90 TABLET | Refills: 3 | Status: SHIPPED | OUTPATIENT
Start: 2022-04-22

## 2022-04-22 NOTE — TELEPHONE ENCOUNTER
----- Message from Quinn Tian sent at 4/22/2022 12:39 PM EDT -----  Regarding: Double Checking on [facility] for 25 & 26 April  Please review  ----- Message -----  From: Radha Garcia  Sent: 4/22/2022  12:37 PM EDT  To: Hematology Oncology [facility]  Subject: Double Checking on [facility] for 25 & 2Mike Dior has an appointment on the 25 and 26 of April  Just checking to make sure the appointments for [facility] have been made    APRIL 25 FOLLOW UP with Pratima Pacheco MD  Arrive by 10:45 AM EDT  Starts at 11:00 AM EDT  [facility] [place]  [address] [id number]    April 26  FOLLOW UP with Shahida Phillips MD  Arrive by 9:45 AM EDT  Starts at 10:00 AM EDT  [facility] [place] [id number]  [phone]  Thank you

## 2022-04-22 NOTE — PROGRESS NOTES
04/22/22 1048   Hello, [Guardians Name / [place], this is [Caller Satish Rosenthal from [facility], and our clinical care team wanted to check on you / your child after your recent visit to the hospital  It will only take 3-5 minutes  Is this a good time? Discharge Call Type/ Specific Diagnosis: ARC 90 Day Call   ARC 90 Day Discharge Call   Assessment Source 1   Call Complete for 90 Days call back? Complete   Call Complete   Hi, This is ________ from [facility]  This is just a courtesy call, and there is no need to call us back  Have a great day     (Called by [facility])

## 2022-04-25 ENCOUNTER — OFFICE VISIT (OUTPATIENT)
Dept: HEMATOLOGY ONCOLOGY | Facility: CLINIC | Age: 72
End: 2022-04-25
Payer: MEDICARE

## 2022-04-25 VITALS
SYSTOLIC BLOOD PRESSURE: 110 MMHG | OXYGEN SATURATION: 98 % | WEIGHT: 171 LBS | RESPIRATION RATE: 18 BRPM | HEIGHT: 64 IN | DIASTOLIC BLOOD PRESSURE: 72 MMHG | HEART RATE: 60 BPM | TEMPERATURE: 96.8 F | BODY MASS INDEX: 29.19 KG/M2

## 2022-04-25 DIAGNOSIS — R21 SKIN RASH: ICD-10-CM

## 2022-04-25 DIAGNOSIS — C34.12 MALIGNANT NEOPLASM OF UPPER LOBE OF LEFT LUNG (HCC): ICD-10-CM

## 2022-04-25 DIAGNOSIS — C34.91 MALIGNANT NEOPLASM OF UNSPECIFIED PART OF RIGHT BRONCHUS OR LUNG (HCC): Primary | ICD-10-CM

## 2022-04-25 DIAGNOSIS — C41.9 MALIGNANT NEOPLASM OF BONE WITH METASTASES (HCC): ICD-10-CM

## 2022-04-25 PROBLEM — R91.8 GROUND GLASS OPACITY PRESENT ON IMAGING OF LUNG: Status: RESOLVED | Noted: 2022-01-04 | Resolved: 2022-04-25

## 2022-04-25 PROBLEM — E87.6 HYPOKALEMIA: Status: RESOLVED | Noted: 2022-01-03 | Resolved: 2022-04-25

## 2022-04-25 PROBLEM — R53.83 LETHARGY: Status: RESOLVED | Noted: 2022-01-12 | Resolved: 2022-04-25

## 2022-04-25 PROBLEM — Z63.6 CAREGIVER STRESS: Status: RESOLVED | Noted: 2020-01-20 | Resolved: 2022-04-25

## 2022-04-25 PROBLEM — R80.1 PERSISTENT PROTEINURIA: Status: RESOLVED | Noted: 2020-11-19 | Resolved: 2022-04-25

## 2022-04-25 PROBLEM — E11.9 DIABETES TYPE 2, CONTROLLED (HCC): Status: RESOLVED | Noted: 2022-01-03 | Resolved: 2022-04-25

## 2022-04-25 PROBLEM — I87.8 POOR VENOUS ACCESS: Status: RESOLVED | Noted: 2021-10-01 | Resolved: 2022-04-25

## 2022-04-25 PROBLEM — R53.83 OTHER FATIGUE: Status: RESOLVED | Noted: 2019-10-08 | Resolved: 2022-04-25

## 2022-04-25 PROBLEM — E83.42 HYPOMAGNESEMIA: Status: RESOLVED | Noted: 2022-01-04 | Resolved: 2022-04-25

## 2022-04-25 PROBLEM — N18.32 STAGE 3B CHRONIC KIDNEY DISEASE (HCC): Status: RESOLVED | Noted: 2020-11-19 | Resolved: 2022-04-25

## 2022-04-25 PROBLEM — D50.9 IRON DEFICIENCY ANEMIA: Status: RESOLVED | Noted: 2021-04-08 | Resolved: 2022-04-25

## 2022-04-25 PROBLEM — M62.81 GENERALIZED MUSCLE WEAKNESS: Status: RESOLVED | Noted: 2022-02-04 | Resolved: 2022-04-25

## 2022-04-25 PROBLEM — R91.8 LUNG NODULE, MULTIPLE: Status: RESOLVED | Noted: 2018-11-07 | Resolved: 2022-04-25

## 2022-04-25 PROBLEM — R22.42 LOCALIZED SWELLING OF LEFT FOOT: Status: RESOLVED | Noted: 2020-02-17 | Resolved: 2022-04-25

## 2022-04-25 PROBLEM — R60.0 BILATERAL LEG EDEMA: Status: RESOLVED | Noted: 2022-02-04 | Resolved: 2022-04-25

## 2022-04-25 PROCEDURE — 99214 OFFICE O/P EST MOD 30 MIN: CPT | Performed by: INTERNAL MEDICINE

## 2022-04-25 NOTE — PROGRESS NOTES
Hematology Outpatient Follow - Up Note  Enrique Griffith 66 y o  female MRN: @ Encounter: [id number]        Date:  4/25/2022        Assessment/ Plan:      Stage 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 of the lung with left scapula involvement diagnosed on 08/2016 PDL expression more than 50%, negative for EGFR mutation, ALK  rearrangement, Ros1 mutation     Treated initially with Pembrolizumab complicated with pneumonitis and later on nivolumab with prednisone 10 mg p o   Daily with excellent response      2   Disease progression in August 2018 in the left scapula with pain no new lesions by PET scan   Status post radiation therapy to the left scapula and treated with Alimta 500 milligram/meter squared, carboplatin AUC 5     After 3 cycles,  CT scan in January 2019 showed stable disease, and she was placed on maintenance Alimta 500 milligram/meter squared every 3 weeks         Alimta dose was reduced to 400 milligram/meter IV due to elevated Creatinine and  then Pemetrexed dose was reduced to 300 milligram/meter squared every 4 weeks    Cycle #38  Alimta was 6/22/21          3   Progression of disease 7/2021        Nivolumab 240 mg flat dose every 3 weeks and  carboplatin AUC 5 added to  pemetrexed which was dose reduced to 250 milligram/meter squared, initiated 8/2/2021          6   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%   (MAP2K1 mutations are mutually exclusive with BRAF mutations)  There are FDA approved therapies indicated for other disease states such as binimetinib, cobimetinib, selumetinib, trametinib   Given the very small (0 1%)  DNA amplification, use of these medications off label are likely to offer minimal benefit      Progression of disease on CT scan on 09/23/2021, initiated on Taxotere/Cyramza on for 3 cycles, with stable disease, she had right basal ganglia and right ischemic CVA on 01/2021 with hospitalization and rehab    Most likely secondary to Cyramza    Therapy changed to Navelbine 30 mg per m2 every other week since 02/02/2021 2022, stable disease by CT scan of the chest on 04/2022 we reviewed it, continue with the treatment and will follow-up in 8 weeks and another CT scan in 4 months    Atrial fibrillation on Eliquis      Labs and imaging studies are reviewed by ordering provider once results are available  If there are findings that need immediate attention, you will be contacted when results available  Discussing results and the implication on your healthcare is best discussed in person at your follow-up visit  HPI:    Dai Garcia was admitted to the hospital with arrhythmia and was found to have right lower lobe infiltrate in August 2016   She was treated with antibiotics however repeat chest x-ray showed persistent right lower lobe infiltrate  Subsequently the patient had a CT scan of the chest which showed a right perihilar mass, subcarinal lymphadenopathy, lytic lesion of the right costovertebral junction at T10 level   PET scan October 2016 showed a right perihilar mass measuring 3 5 cm with SUV of 8 9, subcarinal lymph nodes measuring 3 4 x 2 2 cm with SUV of 9 2  Nodule in the left upper lobe lung measured 2 x 1 1 cm   There was a lytic lesion involving the right 10th costovertebral junction with SUV of 14  4      Biopsy showed non-small cell carcinoma with features suggesting of adenocarcinoma positive for CK 7, CK 19, CA-19-9, ANEUDY-3, partially positive for P40, p63, negative for TTF-1   She had a history of uterine cancer in 2000 status post hysterectomy and did not require radiation or chemotherapy   She is status post bilateral oophorectomy, right knee replacement,   tonsillectomy       She used to smoke for 35 years 1 pack per day however quit smoking 21 years ago   She used hormonal replacement therapy for 3 years  Audrey Díaz has a family history significant for skin cancer in her father and coronary artery disease in mother  Audrey Díaz has 2 healthy children      Treated initially with Pembrolizumab December 2016, Finished in May 2017 secondary to grade 3 pneumonitis  Initiated on prednisone     Progression: Nivolumab 240 mg flat dose every 2 weeks along with prednisone 10 mg p o  Daily initiated April 2018- October 2018 with excellent response      Liquid biopsy showed K-MADHURI mutation G12C, no evidence of MSI high        Disease progression in August 2018 in the left scapula with pain no new lesions by PET scan   Status post radiation therapy to the left scapula and treated with Alimta 500 milligram/meter squared, carboplatin AUC 5   After 3 cycles,  CT scan in January 2019 showed stable disease  Carbo discontinued 3/2019    Maintenance Alimta 500 milligram/meter squared every 3 weeks initiated 3/2019        Cr 2/20 was 1 5   Plan was to dose reduce Alimta to 400mg/m2; however cr 2 16 2/24/20 and Alimta was held       3/4/20:  renal u/s  Minimal fullness of the left renal collecting system without matthieu hydronephrosis     Chronic right kidney lower pole cortical scar, with adjacent parenchymal calcification measuring 5 mm      She was on prednisone 5 mg p o  daily because of previous history of pneumonitis  CT scan chest 1/3/2020 showed no evidence of infiltration in the lung parenchyma, prednisone was reduced every other day for 1 month and then discontinued        CT scan 4/2020 showed stable disease in the left upper lobe of the lung     CT scan on 07/2020 showed stable disease in the left upper lobe of the lung, pancreatic cyst 2 3 cm, stable from the previous CT scan however bigger from last year CT scan       Chronic LLE edema since fall she had 2/2020    Venous doppler negative for clot      CT scan in February 2021 showed stable left upper lobe lung nodule however with progressive chronic kidney disease we decided to hold pemetrexed, status post SBRT to the left upper lobe lung nodule     CT scan of the chest in June 2021 showed increase in the size of the right lower lobe lung mass may be progression of disease versus inflammation/ infection     PET scan on 07/14/2021 showed large consolidation in the left lobe might be metastatic disease versus inflammation, uptake in enlarging right lower lobe lung lesion suspicious for malignancy, no evidence of disease in the neck abdomen or pelvis, mild disease in the left scapula      Nivolumab 240 mg flat dose every 3 weeks, pemetrexed 250 milligram/meter squared, carboplatin AUC 5 every 3 weeks Initiated 8/2/2021 8/23/21 Evaluated patient in infusion center  Erythematous rash to forehead, circumferentially around neck R > L, and on right hand near IV site  Rash blanches and is not raised, well circumscribed borders  Presumed secondary to Opdivo vs alimta  She did receive her 2nd cycle of treatment        Patient with worsening renal function and hypercalcemia    Alimta held and Xgeva given 9/24/21  Vitamin-D level as well as iPTH and parathyroid hormone related protein requested but not drawn  Repeat BMP requested - not drawn        Progression of Disease on CT 9/23/21    Further increase in extensive left upper/lower lobe airspace consolidation with somewhat masslike configuration   Nodular region right diaphragmatic leaflet  measuring up to 2 9 x 1 6 cm   3 0 x 2 4 previously initiated on Ramucirumab 10 milligram/kilogram, Taxotere 75 milligram/meter squared every 3 weeks on 10/15/2021  C diff colitis 10/19/21 treated with metronidazole successful        Noncontrast (per patient request)Brain MRI 11/1/21 prior to initiation of Shelbie Keith did not identify metastases         Admitted 1/3 - 1/21/22 regarding recurrent falls and altered mental status   Negative CT head however brain  MRI ultimately revealed a acute right basal ganglia ischemic CVA   MRA/MRV negative for occlusive disease   MRI brain with contrast negative for any enhancing lesions   Patient seen by Neurology and determined etiology of CVA small vessel disease  Aspirin was added to her secondary CVA ppx     She was treated for UTI        Transitioned to [facility] for rehabilitation 2nd to decrease from her baseline in mobility, cognition and self care        At her 2/14/22 f/u options discussed  She declined hospice/palliative care  With disease progression initiated on Navelbine 25 mg per m2 every other week on 02/21/2022, CT scan in April 2022 showed no evidence of recurrence of disease, stable left upper lobe fibrotic lesion         Interval History:        Previous Treatment:         Test Results:    Imaging: CT chest w contrast    Result Date: 4/20/2022  Narrative: CT CHEST WITH IV CONTRAST INDICATION:   C34 90: Malignant neoplasm of unspecified part of unspecified bronchus or lung  Adenocarcinoma of the left upper lobe with scapular involvement diagnosed in August 2016  Treated with immunotherapy and scapular radiation  SBRT to the left lung completed 4/21/2021  Initiated Navelbine on 2/21/2022  COMPARISON:  Chest CT from 3/1/2022, 1/3/2022, and 11/29/2021  PET CT from 7/14/2021  TECHNIQUE: Chest CT with intravenous contrast   Axial, sagittal, coronal 2D reformats and coronal MIPS from source data  Radiation dose length product (DLP):  287 mGy-cm   Radiation dose exposure minimized using iterative reconstruction and automated exposure control  IV Contrast:  85 mL of iodixanol (VISIPAQUE) FINDINGS: LUNGS:  Nothing to indicate recurrent tumor  Redemonstration of radiation fibrosis in the left upper lung  Stable 2 5 x 1 27 m tubular opacity in the paraspinal right lower lobe at the site of previous tumor  Stable 6 mm paramediastinal inferior lingular nodule  AIRWAYS: No significant filling defects  PLEURA: Decrease in trace left paraspinal pleural effusion  HEART/GREAT VESSELS:  Normal heart size  Moderate coronary artery calcification indicating atherosclerotic heart disease  MEDIASTINUM AND ANABEL:  Right port at cavoatrial junction   CHEST WALL AND LOWER NECK: Multiple stable thyroid nodules  UPPER ABDOMEN:  Subcentimeter subcapsular left hepatic cyst   Tiny cyst in the dome of the liver  Mild intrahepatic biliary dilation postcholecystectomy  2 cystic lesions in the pancreatic head, 1 5 cm and 1 2 cm with a 3 3 cm cystic lesion in the distal body of the pancreas, all present since at least November 2021  OSSEOUS STRUCTURES: Mild degenerative disease in the spine  Impression: Nothing to indicate recurrent tumor  Multiple cystic pancreatic lesions  See MRI from today  Workstation performed: [id number]     MRI abdomen w wo contrast and mrcp    Result Date: 4/20/2022  Narrative: MRI OF THE ABDOMEN WITH AND WITHOUT CONTRAST WITH MRCP INDICATION: 66 years / Female  K86 2: Cyst of pancreas  COMPARISON: MR abdomen 3/26/2021 TECHNIQUE:  The following pulse sequences were obtained:  Coronal and axial T2 with TE of 90 and 180 respectively, axial T2 with fat saturation, axial FIESTA fat-sat, axial T1-weighted in-and-out-of phase, axial DWI/ADC, pre-contrast axial T1 with fat saturation, post-contrast dynamic axial T1 with fat saturation at 20, 70, and 180 seconds, followed by coronal and 7 minute delayed axial T1 with fat saturation  3D MRCP images were obtained with radial thick slabs and projections  3D rendering was performed from the acquisition scanner  Imaging performed on 1 5T MRI IV Contrast:  7 mL of Gadobutrol injection (SINGLE-DOSE) FINDINGS: LOWER CHEST: Enhancing nodular focus in the medial basilar right lower lobe measuring approximately 1 7 x 1 0 cm with diffusion restriction (series 13 image 30) in the area of previously seen mass on PET/CT 7/14/2021  Residual tumor not excluded  Small hiatal hernia  LIVER: Normal in size and configuration  No suspicious mass  Small hepatic cysts  The hepatic veins and portal veins are patent   BILE DUCTS:  Dilatation of the common bile duct to 12 mm and mild prominence of the central intrahepatic bile ducts similar to prior may be related to postcholecystectomy changes  No choledocholithiasis or suspicious mass  GALLBLADDER:  Surgically absent  PANCREAS:  Similar appearance of numerous cystic lesions throughout the pancreas, some with thin septations  The largest cystic lesion in the pancreatic body measures 3 2 x 2 3 cm (series 8 image 105) with thin septations, similar to prior  No solid components or main pancreatic ductal dilation  ADRENAL GLANDS:  Normal  SPLEEN:  Normal  KIDNEYS/PROXIMAL URETERS:  No hydroureteronephrosis  No suspicious renal mass  Subcentimeter right renal cyst  BOWEL:  No dilated loops of bowel  PERITONEUM/RETROPERITONEUM:  No ascites  LYMPH NODES:  No abdominal lymphadenopathy  VASCULAR STRUCTURES:  No aneurysm  ABDOMINAL WALL:  Unremarkable  OSSEOUS STRUCTURES:  No suspicious osseous lesion  Impression: 1  No significant interval change in numerous cystic 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  The largest is a thinly septated cystic lesion in the pancreatic body measuring 3 2 cm similar to prior  No development of solid components or main pancreatic ductal dilation  For simple cyst(s) 2 cm or greater recommend gastroenterology and/or surgical oncology consult and consideration for EUS  Recommendations are based on recent consensus statements on management of pancreatic cystic lesions from [address] [place] [address] Gastro[employer]ology Association, [facility], the Journal of Pancreatology, and our own institutional consensus  2   Enhancing nodular focus in the medial basilar right lower lobe measuring approximately 1 7 x 1 0 cm with diffusion restriction in the area of previously seen mass on PET/CT 7/14/2021  Residual tumor not excluded  Please refer to report for same-day  chest CT   Workstation performed: [id number]       Labs:   Lab Results   Component Value Date    WBC 5 18 04/18/2022    HGB 10 8 (L) 04/18/2022    HCT 33 8 (L) 04/18/2022 MCV 90 04/18/2022     04/18/2022     Lab Results   Component Value Date     11/23/2015    K 4 6 04/18/2022     04/18/2022    CO2 24 04/18/2022    ANIONGAP 9 11/23/2015    BUN 33 (H) 04/18/2022    CREATININE 1 62 (H) 04/18/2022    GLUCOSE 98 01/03/2022    GLUF 97 04/18/2022    CALCIUM 8 6 04/18/2022    CORRECTEDCA 9 6 04/18/2022    AST 20 04/18/2022    ALT 23 04/18/2022    ALKPHOS 87 04/18/2022    PROT 6 8 11/23/2015    BILITOT 0 65 11/23/2015    EGFR 31 04/18/2022       Lab Results   Component Value Date    IRON 79 02/16/2022    TIBC 320 02/16/2022    FERRITIN 49 02/16/2022       No results found for: [id number]      ROS: Review of Systems   Constitutional: Negative for appetite change, chills, diaphoresis, fatigue and unexpected weight change  HENT:   Negative for mouth sores, nosebleeds, sore throat, trouble swallowing and voice change  Eyes: Negative for eye problems and icterus  Respiratory: Positive for shortness of breath  Negative for chest tightness, cough, hemoptysis and wheezing  Cardiovascular: Negative for chest pain, leg swelling and palpitations  Gastrointestinal: Negative for abdominal distention, abdominal pain, blood in stool, constipation, diarrhea, nausea and vomiting  Endocrine: Negative for hot flashes  Genitourinary: Negative for bladder incontinence, difficulty urinating, dyspareunia, dysuria and frequency  Musculoskeletal: Negative for arthralgias, back pain, gait problem, neck pain and neck stiffness  Skin: Negative for itching and rash  Neurological: Negative for dizziness, gait problem, headaches, numbness, seizures and speech difficulty  Hematological: Negative for adenopathy  Does not bruise/bleed easily  Psychiatric/Behavioral: Negative for decreased concentration, depression, sleep disturbance and suicidal ideas  The patient is not nervous/anxious             Current Medications: Reviewed  Allergies: Reviewed  PMH/FH/SH: Reviewed      Physical Exam:    Body surface area is 1 83 meters squared  Wt Readings from Last 3 Encounters:   22 77 6 kg (171 lb)   22 78 kg (172 lb)   22 77 4 kg (170 lb 9 6 oz)        Temp Readings from Last 3 Encounters:   22 (!) 96 8 °F (36 °C)   22 (!) 96 9 °F (36 1 °C) (Temporal)   22 (!) 96 9 °F (36 1 °C)        BP Readings from Last 3 Encounters:   22 110/72   22 114/60   22 116/60         Pulse Readings from Last 3 Encounters:   22 60   22 64   22 66        Physical Exam  Vitals reviewed  Constitutional:       General: She is not in acute distress  Appearance: She is well-developed  She is not diaphoretic  HENT:      Head: Normocephalic and atraumatic  Eyes:      Conjunctiva/sclera: Conjunctivae normal    Neck:      Trachea: No tracheal deviation  Cardiovascular:      Rate and Rhythm: Normal rate and regular rhythm  Heart sounds: No murmur heard  No friction rub  No gallop  Pulmonary:      Effort: Pulmonary effort is normal  No respiratory distress  Breath sounds: Normal breath sounds  No wheezing or rales  Chest:      Chest wall: No tenderness  Abdominal:      General: There is no distension  Palpations: Abdomen is soft  Tenderness: There is no abdominal tenderness  Musculoskeletal:      Cervical back: Normal range of motion and neck supple  Right lower leg: No edema  Left lower leg: No edema  Lymphadenopathy:      Cervical: No cervical adenopathy  Skin:     General: Skin is warm and dry  Coloration: Skin is not pale  Findings: No erythema  Neurological:      Mental Status: She is alert and oriented to person, place, and time  Psychiatric:         Behavior: Behavior normal          Thought Content: Thought content normal          Judgment: Judgment normal          ECO    Goals and Barriers:  Current Goal: Minimize effects of disease  Barriers: None  Patient's Capacity to Self Care:  Patient is able to self care      Code Status: [unfilled]

## 2022-04-26 ENCOUNTER — OFFICE VISIT (OUTPATIENT)
Dept: SURGICAL ONCOLOGY | Facility: CLINIC | Age: 72
End: 2022-04-26
Payer: MEDICARE

## 2022-04-26 VITALS
BODY MASS INDEX: 29.37 KG/M2 | HEART RATE: 68 BPM | DIASTOLIC BLOOD PRESSURE: 78 MMHG | TEMPERATURE: 96.8 F | RESPIRATION RATE: 18 BRPM | HEIGHT: 64 IN | SYSTOLIC BLOOD PRESSURE: 120 MMHG | OXYGEN SATURATION: 96 % | WEIGHT: 172 LBS

## 2022-04-26 DIAGNOSIS — K86.2 CYST OF PANCREAS: Primary | ICD-10-CM

## 2022-04-26 DIAGNOSIS — K21.9 GERD WITHOUT ESOPHAGITIS: ICD-10-CM

## 2022-04-26 PROCEDURE — 99213 OFFICE O/P EST LOW 20 MIN: CPT | Performed by: STUDENT IN AN ORGANIZED HEALTH CARE EDUCATION/TRAINING PROGRAM

## 2022-04-26 RX ORDER — OMEPRAZOLE 20 MG/1
20 CAPSULE, DELAYED RELEASE ORAL DAILY
Qty: 90 CAPSULE | Refills: 0 | Status: SHIPPED | OUTPATIENT
Start: 2022-04-26 | End: 2022-07-05 | Stop reason: SDUPTHER

## 2022-04-26 NOTE — PROGRESS NOTES
Surgical Oncology Consultation    [address]  [facility]  [address]  [place] [address]    Patient:  Alicia Mixon  1950  [id number]    Primary Care provider:  Sally Ott, [address] [facility]  [facility]  [address] [address]    Referring provider:  No referring provider defined for this encounter  Diagnoses and all orders for this visit:    Cyst of pancreas        Chief Complaint   Patient presents with    Follow-up       No follow-ups on file  Oncology History   Adenocarcinoma of lung, stage 4 ([facility] [address] )   10/18/2016 Initial Diagnosis    Adenocarcinoma of lung, stage 4 ([facility] [address] )     10/18/2016 Biopsy    Final Diagnosis  A  Bone, T10, biopsy:     - Non-small cell carcinoma with features suggesting adenocarcinoma; see note           11/7/2016 - 12/1/2016 Radiation    Course 1: T9 - T11 SPINE  13 fractions   Total Dose = 3,250 cGy     11/17/2016 Biopsy    Final Diagnosis  Lymph Node, Level 7: Conclusive evidence of Malignancy  Poorly differentiated non-small cell carcinoma      Lung, right main stem bronchus, biopsy:              - Poorly differentiated adenocarcinoma       12/15/2016 - 6/1/2017 Chemotherapy    Pembrolizumab 200 mg IV flat dose every 3 weeks      9/13/2017 Biopsy    Bone, left scapula, biopsy:  -  Positive for malignancy, consistent with metastatic adenocarcinoma          10/3/2017 - 10/16/2017 Radiation    palliative course of radiation therapy to the left scapular lesion to 3000 cGy( metastatic from adenocarcinoma of the lung)          4/10/2018 - 10/9/2018 Chemotherapy    nivolumab with prednisone 10 mg p o     11/6/2018 -  Chemotherapy    11/6/18   Alimta 500 milligram/meter squared, carboplatin AUC 5 every 3 weeks,     11/6/2018 - 3/1/2021 Chemotherapy    cyanocobalamin injection 1,000 mcg, 1,000 mcg, Intramuscular, Once, 6 of 10 cycles  Administration: 1,000 mcg (1/17/2020), 1,000 mcg (6/10/2020), 1,000 mcg (10/14/2020), 1,000 mcg (1/5/2021)  fosaprepitant (EMEND) 150 mg in sodium chloride 0 9 % 250 mL IVPB, 150 mg, Intravenous, Once, 1 of 6 cycles  Administration: 150 mg (2/2/2021)  PEMEtrexed (ALIMTA) 1,020 mg in sodium chloride 0 9 % 100 mL chemo infusion, 500 mg/m2 = 1,020 mg, Intravenous, Once, 32 of 37 cycles  Dose modification: 400 mg/m2 (original dose 500 mg/m2, Cycle 23, Reason: Other (See Comments), Comment: decreasing crcl), 300 mg/m2 (original dose 500 mg/m2, Cycle 26, Reason: Treatment Parameters Not Met), 300 mg/m2 (original dose 500 mg/m2, Cycle 30, Reason: Max Dose Reached)  Administration: 1,020 mg (5/24/2019), 1,000 mg (6/14/2019), 1,000 mg (7/5/2019), 1,000 mg (8/2/2019), 1,000 mg (8/23/2019), 1,000 mg (9/13/2019), 1,000 mg (10/4/2019), 1,000 mg (10/25/2019), 1,000 mg (11/15/2019), 1,000 mg (12/6/2019), 1,000 mg (12/27/2019), 1,000 mg (1/17/2020), 1,000 mg (2/7/2020), 800 mg (5/22/2020), 800 mg (6/10/2020), 800 mg (7/1/2020), 600 mg (7/29/2020), 800 mg (8/19/2020), 600 mg (11/11/2020), 600 mg (1/5/2021), 600 mg (2/2/2021), 600 mg (10/14/2020)     11/12/2018 - 11/26/2018 Radiation    Course: C3: Left Scapula retreat  10 fractions  2,000 cGy         4/5/2021 - 4/21/2021 Radiation    Course: C4 SBRT    Plan ID Energy Fractions Dose per Fraction (cGy) Dose Correction (cGy) Total Dose Delivered (cGy) Elapsed Days   SBRT LtLung 6X 8 / 8 750 0 6,000 16      Dr  Adan Bourdon     8/2/2021 - 9/24/2021 Chemotherapy    cyanocobalamin, 1,000 mcg, Intramuscular, Once, 2 of 3 cycles  Administration: 1,000 mcg (7/19/2021)  fosaprepitant (EMEND) IVPB, 150 mg, Intravenous, Once, 3 of 6 cycles  Administration: 150 mg (8/2/2021), 150 mg (9/24/2021), 150 mg (8/23/2021)  nivolumab (OPDIVO) IVPB, 240 mg (100 % of original dose 240 mg), Intravenous, Once, 3 of 6 cycles  Dose modification: 240 mg (original dose 240 mg, Cycle 1)  Administration: 240 mg (8/2/2021), 240 mg (8/23/2021), 240 mg (9/24/2021)  CARBOplatin (PARAPLATIN) IVPB (GOG AUC DOSING), 275 mg, Intravenous, Once, 3 of 6 cycles  Dose modification: 304 5 mg (original dose 279 5 mg, Cycle 3, Reason: Other (Must fill in a comment), Comment:  to sign order),   (original dose 279 5 mg, Cycle 3, Reason: Treatment Parameters Not Met)  Administration: 275 mg (8/2/2021), 273 5 mg (9/24/2021), 304 5 mg (8/23/2021)  pemetrexed (ALIMTA) chemo infusion, 490 mg (50 % of original dose 500 mg/m2), Intravenous, Once, 3 of 6 cycles  Dose modification: 250 mg/m2 (original dose 500 mg/m2, Cycle 1, Reason: Anticipated Tolerance)  Administration: 500 mg (8/2/2021), 490 mg (8/23/2021)     10/15/2021 - 12/17/2021 Chemotherapy    pegfilgrastim (NEULASTA ONPRO), 6 mg, Subcutaneous, Once, 4 of 8 cycles  Administration: 6 mg (10/15/2021), 6 mg (11/5/2021), 6 mg (11/26/2021), 6 mg (12/17/2021)  fosaprepitant (EMEND) IVPB, 150 mg, Intravenous, Once, 4 of 8 cycles  Administration: 150 mg (10/15/2021), 150 mg (11/5/2021), 150 mg (11/26/2021), 150 mg (12/17/2021)  ramucirumab (CYRAMZA) IVPB, 10 mg/kg = 866 mg, Intravenous, Once, 3 of 3 cycles  Administration: 866 mg (11/5/2021), 900 mg (11/26/2021), 800 mg (12/17/2021)  DOCEtaxel (TAXOTERE) chemo infusion, 75 mg/m2 = 144 mg, Intravenous, Once, 4 of 8 cycles  Dose modification: 60 mg/m2 (original dose 75 mg/m2, Cycle 5, Reason: Anticipated Tolerance)  Administration: 144 mg (10/15/2021), 144 mg (11/5/2021), 144 mg (11/26/2021), 138 mg (12/17/2021)     2/21/2022 -  Chemotherapy    vinORELBine (NAVELBINE) IVPB, 25 mg/m2 = 47 mg (83 3 % of original dose 30 mg/m2), Intravenous, Once, 3 of 6 cycles  Dose modification: 25 mg/m2 (original dose 30 mg/m2, Cycle 1, Reason: Dose modified as per discussion with consulting physician)  Administration: 47 mg (2/21/2022), 47 mg (3/7/2022), 47 mg (3/21/2022), 47 mg (4/4/2022), 47 mg (4/18/2022)     Malignant neoplasm of unspecified part of right bronchus or lung ([facility] [address] ) (Resolved)   5/24/2017 Initial Diagnosis    Malignant neoplasm of unspecified part of right bronchus or lung ([facility] [address] ) (Resolved)     4/5/2021 - 4/21/2021 Radiation    Course: C4 SBRT    Plan ID Energy Fractions Dose per Fraction (cGy) Dose Correction (cGy) Total Dose Delivered (cGy) Elapsed Days   SBRT LtLung 6X 8 / 8 750 0 6,000 16      Dr Elena Reeves     Malignant neoplasm of bone with metastases ([facility] [address] )   5/24/2017 Initial Diagnosis    Malignant neoplasm of bone with metastases ([facility] [address] )     4/5/2021 - 4/21/2021 Radiation    Course: C4 SBRT    Plan ID Energy Fractions Dose per Fraction (cGy) Dose Correction (cGy) Total Dose Delivered (cGy) Elapsed Days   SBRT LtLung 6X 8 / 8 750 0 6,000 16      Dr Elena Reeves     10/15/2021 - 12/17/2021 Chemotherapy    pegfilgrastim (NEULASTA ONPRO), 6 mg, Subcutaneous, Once, 4 of 8 cycles  Administration: 6 mg (10/15/2021), 6 mg (11/5/2021), 6 mg (11/26/2021), 6 mg (12/17/2021)  fosaprepitant (EMEND) IVPB, 150 mg, Intravenous, Once, 4 of 8 cycles  Administration: 150 mg (10/15/2021), 150 mg (11/5/2021), 150 mg (11/26/2021), 150 mg (12/17/2021)  ramucirumab (CYRAMZA) IVPB, 10 mg/kg = 866 mg, Intravenous, Once, 3 of 3 cycles  Administration: 866 mg (11/5/2021), 900 mg (11/26/2021), 800 mg (12/17/2021)  DOCEtaxel (TAXOTERE) chemo infusion, 75 mg/m2 = 144 mg, Intravenous, Once, 4 of 8 cycles  Dose modification: 60 mg/m2 (original dose 75 mg/m2, Cycle 5, Reason: Anticipated Tolerance)  Administration: 144 mg (10/15/2021), 144 mg (11/5/2021), 144 mg (11/26/2021), 138 mg (12/17/2021)     2/21/2022 -  Chemotherapy    vinORELBine (NAVELBINE) IVPB, 25 mg/m2 = 47 mg (83 3 % of original dose 30 mg/m2), Intravenous, Once, 3 of 6 cycles  Dose modification: 25 mg/m2 (original dose 30 mg/m2, Cycle 1, Reason: Dose modified as per discussion with consulting physician)  Administration: 47 mg (2/21/2022), 47 mg (3/7/2022), 47 mg (3/21/2022), 47 mg (4/4/2022), 47 mg (4/18/2022)     Lung cancer ([facility] [address] )   1/3/2022 Initial Diagnosis    Lung cancer ([facility] [address] )     2/21/2022 - Chemotherapy    vinORELBine (NAVELBINE) IVPB, 25 mg/m2 = 47 mg (83 3 % of original dose 30 mg/m2), Intravenous, Once, 3 of 6 cycles  Dose modification: 25 mg/m2 (original dose 30 mg/m2, Cycle 1, Reason: Dose modified as per discussion with consulting physician)  Administration: 47 mg (2/21/2022), 47 mg (3/7/2022), 47 mg (3/21/2022), 47 mg (4/4/2022), 47 mg (4/18/2022)         History of Present Illness  :  Ms Shilo Kellogg is a 66 yo female diagnosed with metastatic lung cancer several years ago; has been treated with XRT and systemic therapy, with overall stable disease but recent progression of a lung nodule with planned SBRT  Was most recently on nivo but therapy held 2/2 renal insufficiency  Most recent PET demonstrated non avid 3 3 cm mid body panc cyst, which has increased in size since PET 2018 when it was 1 5 cm  Reviewing CT from July, the cyst has not changed in size  Pt is asymptomatic  Feels well with no abd pain, no weight loss recently, no new onset faigue, no hx jaundice  No hx pancreatitis; hx cholecystectomy many years ago with what she describes as continued intermitent RUQ pain  No DM, no symptoms insufficiency, no RFs for pancreatitis such as alcohol use  We decided to per [facility] for dedicated imaging of the pancreas cyst    Interval History 4/26/22  Since being seen a year ago, the patient has change regimens for her progressive metastatic lung cancer  She has also had a stroke, resulting in trouble with balance and repeat falls for which she is currently using a walker  She continues to deny any abdominal pain, nausea vomiting, symptoms that might be associated with her pancreas cyst   MRI obtained this month demonstrated overall stability since last year  Review of Systems  Complete ROS Surg Onc:   Constitutional: The patient denies new or recent history of general fatigue, no recent weight loss, no change in appetite  Eyes: No complaints of visual problems, no scleral icterus     ENT: No complaints of ear pain, no hoarseness, no difficulty swallowing,  no tinnitus and no new masses in head, oral cavity, or neck  Cardiovascular: No complaints of chest pain, no palpitations, no ankle edema  Respiratory: No complaints of shortness of breath, no cough  Gastrointestinal: No complaints of jaundice, no bloody stools, no pale stools  Genitourinary: No complaints of dysuria, no hematuria, no nocturia, no frequent urination, no urethral discharge  Musculoskeletal: No complaints of weakness, paralysis, joint stiffness or arthralgias  Integumentary: No complaints of rash, no new lesions  Neurological: No complaints of convulsions, no seizures, no dizziness  Hematologic/Lymphatic: No complaints of easy bruising  Endocrine:  No hot or cold intolerance  No polydipsia, polyphagia, or polyuria  Allergy/immunology:  No environmental allergies  No food allergies  Not immunocompromised  Skin:  No pallor or rash  No wound        Patient Active Problem List   Diagnosis    Paroxysmal atrial fibrillation (HCC)    Primary hypertension    Adenocarcinoma of lung, stage 4 (HCC)    Chronic diastolic heart failure ([place] [address] )    Cancer related pain    [facility] mellitus type [facility], [facility]com[facility]    Malignant neoplasm of bone with metastases ([facility] )    Acid reflux    Hyperlipidemia    Major depressive disorder with single episode, in full remission ([facility] )    Vitamin D deficiency    Adhesive capsulitis of shoulder    Anxiety    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)    CINV (chemotherapy-induced nausea and vomiting)    Cyst of pancreas    Pneumonitis    Anemia in stage 3 chronic kidney disease ([facility] )    Palliative care patient    Hypercalcemia    Medicare annual wellness visit, subsequent    Chemotherapy [facility] neutropenia [facility]  )    Former smoker    Lung cancer ([facility]    Depression    History of stroke    GERD (gastroesophageal reflux disease)    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[place] [address] )    Dyslipidemia    Cognitive decline     Past Medical History:   Diagnosis Date    A-fib ([place] [address] )     Arthritis     Atrial fibrillation ([place] [address] )     Confusion     Diabetes mellitus ([place] [address] )     Diabetes mellitus type 2, uncomplicated (HCC)     Last assessed: 8/17/17    Encephalopathy 1/6/2022    Essential hypertension     Frozen shoulder     L shoulder    GERD (gastroesophageal reflux disease)     Hx of cancer of uterus     Last assessed: 8/21/15    Hyperlipidemia     Hypertension     Hyponatremia 11/16/2016    Lung mass     diagnosed 9/2016    Malignant neoplasm without specification of site ([place] [address] )     Skin cancer, basal cell     right eye area    Stage 4 lung cancer ([place] [address] )      Past Surgical History:   Procedure Laterality Date    APPENDECTOMY      BRONCHOSCOPY N/A 11/17/2016    Procedure: BRONCHOSCOPY FLEXIBLE;  Surgeon: Js Juan MD;  [facility];  Service:    Liam Vincent CHOLECYSTECTOMY      COLONOSCOPY      COLONOSCOPY      FL GUIDED CENTRAL VENOUS ACCESS DEVICE INSERTION  12/14/2021    GALLBLADDER SURGERY      HYSTERECTOMY  2000    Total abdominal    JOINT REPLACEMENT      LARYNGOSCOPY      Flexible Fiberoptic, (Therapeutic), Resolved: 11/17/16    MOHS SURGERY      Micrographic Surgery Face    TN BRONCHOSCOPY NEEDLE BX TRACHEA MAIN STEM&/BRON N/A 11/17/2016    Procedure: EBUS; FROZEN SECTION ;  Surgeon: Js Juan MD;  [facility];  Service: Thoracic    TN [address] N/A 5/24/2017    Procedure: BRONCHOSCOPY FLEXIBLE;  Surgeon: Elias Eaton MD;  Location: [facility];   Service: Pulmonary    TN INSJ TUNNELED CTR VAD W/SUBQ PORT AGE 5 YR/> N/A 12/14/2021    Procedure: INSERTION VENOUS PORT ( PORT-A-CATH) IR;  Surgeon: Murali Kitchen DO;  [facility];  Service: Interventional Radiology    TONSILECTOMY AND ADNOIDECTOMY      TONSILLECTOMY      TOTAL KNEE ARTHROPLASTY Right 09/23/2014     Family History   Problem Relation Age of Onset    Heart disease Father         cardiac disorder    Hypertension Father     Arthritis Father     Stroke Father         cerebrovascular accident    Skin cancer Father     Diabetes Mother     Heart disease Mother    Staci Sang Dementia Mother     Hypertension Mother     Thyroid disease Mother     Cancer Maternal Grandfather         of unknown origin    Cancer Family     Depression Family     Diabetes Family     Hyperlipidemia Family         essential    Heart disease Family     Hypertension Family     Stroke Family         syndrome    Lung cancer Paternal Uncle     Muscular dystrophy Brother      Social History     Socioeconomic History    Marital status:      Spouse name: Not on file    Number of children: Not on file    Years of education: Not on file    Highest education level: Not on file   Occupational History    Occupation: retired   Tobacco Use    Smoking status: Never Smoker    Smokeless tobacco: Never Used    Tobacco comment: Quit in 2000   Vaping Use    Vaping Use: Never used   Substance and Sexual Activity    Alcohol use: No    Drug use: No    Sexual activity: Not Currently   Other Topics Concern    Not on file   Social History Narrative    ** Merged History Encounter **         Family dynamics: Supportive  Relationship status:     Children and Ages:1 adult dtr Jerman Murillo, 1 adult son Shamar Oakley  Other important family information: Caregiver for her brother Denia Call, who is disabled  Living situation: Lives with brother        Employm    ent history/source of income: Worked as a  for [employer] prior to alf   Spirituality/ Tenriism: Nondenominational    Patient's strengths, social supports, and resources: Supportive family  Hobbies/Interests: Sewing; Crafting  Advanced Directiv    e: States dtr Jerman Murillo is her POA       Social Determinants of Health     Financial Resource Strain: Not on [employer] Insecurity: Not on file   Transportation Needs: Not on file Physical Activity: Not on file   Stress: Not on file   Social Connections: Not on file   Intimate Partner Violence: Not on file   Housing Stability: Not on file       Current Outpatient Medications:     acetaminophen (TYLENOL) 325 mg tablet, Take 650 mg by mouth every 6 (six) hours as needed for mild pain, Disp: , Rfl:     apixaban (Eliquis) 5 mg, TAKE 1 TABLET TWICE A DAY, Disp: 180 tablet, Rfl: 3    aspirin (ECOTRIN LOW STRENGTH) 81 mg EC tablet, Take 1 tablet (81 mg total) by mouth daily, Disp: 30 tablet, Rfl: 0    atorvastatin (LIPITOR) 40 mg tablet, TAKE 1 TABLET BY MOUTH EVERY DAY, Disp: 30 tablet, Rfl: 1    busPIRone (BUSPAR) 7 5 mg tablet, Take 1 tablet (7 5 mg total) by mouth 2 (two) times a day, Disp: 60 tablet, Rfl: 2    cyanocobalamin (VITAMIN B-12) 100 mcg tablet, Take by mouth daily, Disp: , Rfl:     folic acid (FOLVITE) 1 mg tablet, Take 1 tablet (1 mg total) by mouth daily, Disp: 90 tablet, Rfl: 1    furosemide (LASIX) 20 mg tablet, TAKE 1 TABLET (20 MG TOTAL) BY MOUTH DAILY AS NEEDED (LEG EDEMA), Disp: 30 tablet, Rfl: 0    Klor-Con M10 10 MEQ tablet, TAKE 1 TABLET BY MOUTH EVERY DAY, Disp: 30 tablet, Rfl: 5    lidocaine-prilocaine (EMLA) cream, Apply to port site 30 minutes prior to infusion and cover with a dressing, Disp: 30 g, Rfl: 1    linaGLIPtin (Tradjenta) 5 MG TABS, Take 5 mg by mouth daily, Disp: 90 tablet, Rfl: 3    loperamide (IMODIUM) 2 mg capsule, Take 2 mg by mouth 4 (four) times a day as needed for diarrhea  , Disp: , Rfl:     loratadine (CLARITIN) 10 mg tablet, Take 10 mg by mouth as needed  , Disp: , Rfl:     metFORMIN (GLUCOPHAGE) 1000 MG tablet, Take 0 5 tablets (500 mg total) by mouth 2 (two) times a day with meals, Disp: 180 tablet, Rfl: 3    metoprolol tartrate (LOPRESSOR) 25 mg tablet, Take 1 tablet (25 mg total) by mouth every 12 (twelve) hours, Disp: 180 tablet, Rfl: 3    omeprazole (PriLOSEC) 20 mg delayed release capsule, Take 1 capsule (20 mg total) by mouth daily, Disp: 90 capsule, Rfl: 3    oxyCODONE (Roxicodone) 5 immediate release tablet, Take 1 tablet (5 mg total) by mouth every 6 (six) hours as needed for moderate pain or severe pain Max Daily Amount: 20 mg, Disp: 30 tablet, Rfl: 0    sotalol (BETAPACE) 80 mg tablet, Take 1 tablet (80 mg total) by mouth 2 (two) times a day, Disp: 180 tablet, Rfl: 3    valsartan (DIOVAN) 160 mg tablet, Take 1 tablet (160 mg total) by mouth 2 (two) times a day, Disp: 60 tablet, Rfl: 3    venlafaxine (EFFEXOR) 37 5 mg tablet, Take 1 tablet (37 5 mg total) by mouth daily, Disp: 90 tablet, Rfl: 3  Allergies   Allergen Reactions    Amoxicillin Hives    Amoxicillin Rash and Hives    Cardizem [Diltiazem] Rash     Rash      Statins Myalgia     Severe muscle aching  Terrible pains    Zofran [Ondansetron] Palpitations       Vitals:    04/26/22 0945   BP: 120/78   Pulse: 68   Resp: 18   Temp: (!) 96 8 °F (36 °C)   SpO2: 96%       Physical Exam   General: Appears well, appears stated age  Skin: Warm, anicteric  HEENT: Normocephalic, atraumatic; sclera aniceteric, mucous membranes moist; cervical nodes without adenopathy  Cardiopulmonary: RRR, Easy WOB, no BLE edema  Abd: Flat and soft, nontender, no masses appreciated, no hepatosplenomegaly  Well healed lower midline and Pfan incision 2/2 previous hyst for endometrial cancer  MSK: Symmetric, no cyanosis, no overt weakness  Lymphatic: No cervical, axillary or inguinal lymphadenopathy  Neuro: Affect appropriate, no gross motor abnormalities      Pathology:  Reviewed lung pathology    Labs: Reviewed    Imaging  Ct Chest Wo Contrast    Result Date: 2/27/2021  Narrative: CT CHEST WITHOUT IV CONTRAST INDICATION:   C34 92: Malignant neoplasm of unspecified part of left bronchus or lung  Known history of stage IV adenocarcinoma of the lung with left scapular involvement  Status post radiation therapy to the left scapula  Currently maintained on immuno-oncologic therapy  Surveillance  COMPARISON:  CT, dated November 17, 2020  TECHNIQUE: CT examination of the chest was performed without intravenous contrast   Axial, sagittal, and coronal 2D reformatted images were created from the source data and submitted for interpretation  Radiation dose length product (DLP) for this visit:  478 mGy-cm   This examination, like all CT scans performed in the [facility], was performed utilizing techniques to minimize radiation dose exposure, including the use of iterative reconstruction and automated exposure control  FINDINGS: LUNGS:  Redemonstrated are multiple pulmonary nodules  The dominant focus in the left upper lobe measures approximately 22 x 15 mm in greatest transverse dimensions (series 3, image 41), previously 21 x 15 mm  A more peripheral focus also in the left upper lobe measures 13 x 12 mm (series 3, image 43), previously 13 x 13 mm  Nodularity in the right costophrenic sulcus measures 16 x 12 mm in transverse dimensions (series 3, image 98), previously 15 x 11 mm  A left lower lobe pleural nodule measures 10 x 8 mm in greatest transverse dimensions (series 3, image 90), previously 9 x 8 mm  A 4 mm right lower lobe pulmonary nodule is stable (series 3, image 79)  There are no new pulmonary nodules  Right middle lobar atelectasis is presumed secondary to radiation fibrosis, stable  There are mild new bibasilar areas of mild groundglass opacity, nonspecific  A small amount of debris is present in the upper trachea  The endobronchial tree is under otherwise normal  PLEURA:  Unremarkable  HEART/GREAT VESSELS:  Atherosclerotic changes are noted in thoracic aorta and coronary arteries  MEDIASTINUM AND ANABEL:  Unremarkable  CHEST WALL AND LOWER NECK:   Unremarkable   VISUALIZED STRUCTURES IN THE UPPER ABDOMEN:  Redemonstrated is the incompletely characterized cystic mass in the body of the pancreas, measuring 30 x 30 mm in greatest transverse dimensions (series 2, image 61), previously 30 x 29 mm  OSSEOUS STRUCTURES:  Spinal degenerative changes are noted  No acute fracture or destructive osseous lesion  Sclerosis of the left scapular body corresponding to known metastatic disease is stable  Impression: 1  Stable pulmonary nodules  2   Interval development of mild bibasilar ground glass opacities  Though nonspecific, aspiration could be a possible source  Continued follow-up on subsequent oncologic surveillance is recommended  3   Stable size of the incompletely characterized cystic pancreatic mass  Once again, MRI utilizing a pancreatic mass protocol is recommended for complete characterization  The study was marked in EPIC for significant notification  Workstation performed: [id number]     Nm Pet Ct Skull Base To Mid Thigh    Result Date: 3/12/2021  Narrative: PET/CT SCAN INDICATION:  C34 12: Malignant neoplasm of upper lobe, left bronchus or lung C34 90: Malignant neoplasm of unspecified part of unspecified bronchus or lung   , restaging for treatment management, history of radiation to left scapula for metastasis, history of endometrial cancer, status post RICHARD/BSO 2000, Covid vaccination 1 in left arm 2/20/2021 MODIFIER: PS COMPARISON: CT chest 2/22/2021 and priors, including PET CT 10/20/2018 CELL TYPE:  Adenocarcinoma, T10 bone biopsy 10/18/2016 TECHNIQUE:   10 5 mCi [id number] administered IV  Multiplanar attenuation corrected and non attenuation corrected PET images were acquired 60 minutes post injection  Contiguous, low dose, axial CT sections were obtained from the skull base through the femurs   Intravenous contrast material was not utilized  This examination, like all CT scans performed in the [facility], was performed utilizing techniques to minimize radiation dose exposure, including the use of iterative reconstruction and automated exposure control   Fasting serum glucose: 109 mg/dl FINDINGS: VISUALIZED BRAIN:   No acute abnormalities are seen  HEAD/NECK:   There is a physiologic distribution of FDG  No FDG avid cervical adenopathy is seen  CT images: Scattered sinus mucosal thickening  CHEST:   Dominant left upper lung lesion image 3/84 has slowly increased in size and FDG intensity since the prior PET/CT, currently measuring approximately 2 7 x 1 5 cm, SUV 4 7  Prior PET/CT measurement 2 9 x 1 3 cm, SUV 2 4  This is most concerning for viable tumor  Enlarging adjacent left upper lung nodule image 3/83 measuring 1 2 x 1 1 cm  Prior PET/CT measurement 3 mm  This does not demonstrate significant FDG uptake, SUV 0 9  However a low-grade neoplasm remains possible  Persistent nodular infiltrate in the right medial lung base  This also demonstrates slow interval growth since prior studies  This currently measures 2 8 x 1 3 cm on image 3/110  SUV measures 4 8  Prior PET/CT measurement approximately 2 8 x 1 cm, though SUV measurement is limited due to adjacent liver activity, 2 3  This is nonspecific and may be inflammatory/infectious, with neoplasm not entirely excluded  Left lung base nodule measuring 8 x 7 mm image 3/109 appears stable and does not demonstrate significant FDG uptake  This would favor a benign etiology  CT images: Coronary atherosclerosis  Stable thickening along the right minor fissure  ABDOMEN: Enlarging pancreatic cystic lesion without significant FDG uptake  This measures 3 3 x 2 7 cm  Prior PET/CT measurement 1 5 x 1 5 cm  Bowel activity is likely physiologic  No FDG avid soft tissue lesions are seen  CT images: Cholecystectomy  Right renal cortical scarring  PELVIS: No FDG avid soft tissue lesions are seen  CT images: Hysterectomy  OSSEOUS STRUCTURES: Left scapular lytic lesion again noted with significantly decreased FDG uptake, SUV 2 4, compatible with treated metastasis  Prior SUV 14 7  No new FDG avid lesions are seen  CT images: Spine degenerative change  Mild lumbar levoscoliosis  Impression: 1    Slowly enlarging dominant left upper lung lesion with increasing FDG uptake, most concerning for viable tumor  2   Enlarging adjacent left upper lung nodule  Although this does not demonstrate significant FDG uptake, this remains concerning for a low-grade neoplasm  Consider tissue sampling if this would alter clinical management  3   Slowly increasing nodular infiltrate in the right medial lung base with increased FDG uptake  This may be inflammatory/infectious, with neoplasm also not excluded  Close follow-up or tissue sampling also suggested  4   Enlarging pancreatic cystic lesion without significant FDG uptake  Dedicated contrast MR evaluation suggested for further evaluation  The study was marked in EPIC for significant notification  Workstation performed: [id number]    CT 4/2022  Stable 2 9 cm pancreatic body cystic lesion is noted  MR 4/18/22  IMPRESSION:     1  No significant interval change in numerous cystic 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  The largest is a thinly septated cystic lesion in the pancreatic body measuring 3 2 cm similar to prior  No development of solid   components or main pancreatic ductal dilation  For simple cyst(s) 2 cm or greater recommend gastroenterology and/or surgical oncology consult and consideration for EUS     Recommendations are based on recent consensus statements on management of pancreatic cystic lesions from [facility], [facility], the Journal of Pancreatology, and our own institutional consensus  I reviewed and independently interpreted the above laboratory and imaging data  Discussion/Summary: 64 yo female with stable panc mid-body cyst - one year from her previous study  Since that time, the patient has been started on new treatment for her progressive stage IV lung cancer, and has had a stroke resulting in decline in her functional status now requiring a walker    We discussed again that panc cysts can range from entirely benign to premalignant or even harbor malignancy  Described that in the context of metastatic lung cancer, we are unlikely to pursue surgical treatment for a pancreatic cyst, and I do not think it makes sense to continue Sitz pancreas specific surveillance  She continues to see her Medical Oncology team for ongoing management of her lung cancer  Should her interval CT scans demonstrate anything concerning within the pancreas in terms of cyst change, we can readdress at that time  She agrees that she is very unlikely to pursue any type of surgery for a pancreas cyst given the status of her lung cancer and function  I will communicate this to her medical oncology team and she can follow up p r n

## 2022-04-26 NOTE — LETTER
April 26, 2022     [address] [place] [address]    Patient: Porsche Day   YOB: 1950   Date of Visit: 4/26/2022       Dear Dr Edwige Vera: Thank you for referring Lee Farah to me for evaluation  Below are my notes for this consultation  If you have questions, please do not hesitate to call me  I look forward to following your patient along with you  Sincerely,        Bonnie Dunn MD        CC: MD Bonnie Peters MD  4/26/2022 10:43 AM  Incomplete  Surgical Oncology Consultation    [address]  [facility] [address]    Patient:  Surya Galvez [facility]  1950  [id number]    Primary Care provider:  Leilani Qiu, [facility] [address] [address],[address]  [address] [address]    Referring provider:  No referring provider defined for this encounter  Diagnoses and all orders for this visit:    Cyst of pancreas        Chief Complaint   Patient presents with    Follow-up       No follow-ups on file  Oncology History   Adenocarcinoma of lung, stage 4 ([facility] )   10/18/2016 Initial Diagnosis    Adenocarcinoma of lung, stage 4 ([facility] [address] )     10/18/2016 Biopsy    Final Diagnosis  A  Bone, T10, biopsy:     - Non-small cell carcinoma with features suggesting adenocarcinoma; see note           11/7/2016 - 12/1/2016 Radiation    Course 1: T9 - T11 SPINE  13 fractions   Total Dose = 3,250 cGy     11/17/2016 Biopsy    Final Diagnosis  Lymph Node, Level 7: Conclusive evidence of Malignancy  Poorly differentiated non-small cell carcinoma      Lung, right main stem bronchus, biopsy:              - Poorly differentiated adenocarcinoma       12/15/2016 - 6/1/2017 Chemotherapy    Pembrolizumab 200 mg IV flat dose every 3 weeks      9/13/2017 Biopsy    Bone, left scapula, biopsy:  -  Positive for malignancy, consistent with metastatic adenocarcinoma 10/3/2017 - 10/16/2017 Radiation    palliative course of radiation therapy to the left scapular lesion to 3000 cGy( metastatic from adenocarcinoma of the lung)          4/10/2018 - 10/9/2018 Chemotherapy    nivolumab with prednisone 10 mg p o     11/6/2018 -  Chemotherapy    11/6/18   Alimta 500 milligram/meter squared, carboplatin AUC 5 every 3 weeks,     11/6/2018 - 3/1/2021 Chemotherapy    cyanocobalamin injection 1,000 mcg, 1,000 mcg, Intramuscular, Once, 6 of 10 cycles  Administration: 1,000 mcg (1/17/2020), 1,000 mcg (6/10/2020), 1,000 mcg (10/14/2020), 1,000 mcg (1/5/2021)  fosaprepitant (EMEND) 150 mg in sodium chloride 0 9 % 250 mL IVPB, 150 mg, Intravenous, Once, 1 of 6 cycles  Administration: 150 mg (2/2/2021)  PEMEtrexed (ALIMTA) 1,020 mg in sodium chloride 0 9 % 100 mL chemo infusion, 500 mg/m2 = 1,020 mg, Intravenous, Once, 32 of 37 cycles  Dose modification: 400 mg/m2 (original dose 500 mg/m2, Cycle 23, Reason: Other (See Comments), Comment: decreasing crcl), 300 mg/m2 (original dose 500 mg/m2, Cycle 26, Reason: Treatment Parameters Not Met), 300 mg/m2 (original dose 500 mg/m2, Cycle 30, Reason: Max Dose Reached)  Administration: 1,020 mg (5/24/2019), 1,000 mg (6/14/2019), 1,000 mg (7/5/2019), 1,000 mg (8/2/2019), 1,000 mg (8/23/2019), 1,000 mg (9/13/2019), 1,000 mg (10/4/2019), 1,000 mg (10/25/2019), 1,000 mg (11/15/2019), 1,000 mg (12/6/2019), 1,000 mg (12/27/2019), 1,000 mg (1/17/2020), 1,000 mg (2/7/2020), 800 mg (5/22/2020), 800 mg (6/10/2020), 800 mg (7/1/2020), 600 mg (7/29/2020), 800 mg (8/19/2020), 600 mg (11/11/2020), 600 mg (1/5/2021), 600 mg (2/2/2021), 600 mg (10/14/2020)     11/12/2018 - 11/26/2018 Radiation    Course: C3: Left Scapula retreat  10 fractions  2,000 cGy         4/5/2021 - 4/21/2021 Radiation    Course: C4 SBRT    Plan ID Energy Fractions Dose per Fraction (cGy) Dose Correction (cGy) Total Dose Delivered (cGy) Elapsed Days   SBRT LtLung 6X 8 / 8 750 0 6,000 12        Andolino     8/2/2021 - 9/24/2021 Chemotherapy    cyanocobalamin, 1,000 mcg, Intramuscular, Once, 2 of 3 cycles  Administration: 1,000 mcg (7/19/2021)  fosaprepitant (EMEND) IVPB, 150 mg, Intravenous, Once, 3 of 6 cycles  Administration: 150 mg (8/2/2021), 150 mg (9/24/2021), 150 mg (8/23/2021)  nivolumab (OPDIVO) IVPB, 240 mg (100 % of original dose 240 mg), Intravenous, Once, 3 of 6 cycles  Dose modification: 240 mg (original dose 240 mg, Cycle 1)  Administration: 240 mg (8/2/2021), 240 mg (8/23/2021), 240 mg (9/24/2021)  CARBOplatin (PARAPLATIN) IVPB (GO AUC DOSING), 275 mg, Intravenous, Once, 3 of 6 cycles  Dose modification: 304 5 mg (original dose 279 5 mg, Cycle 3, Reason: Other (Must fill in a comment), Comment:  to sign order),   (original dose 279 5 mg, Cycle 3, Reason: Treatment Parameters Not Met)  Administration: 275 mg (8/2/2021), 273 5 mg (9/24/2021), 304 5 mg (8/23/2021)  pemetrexed (ALIMTA) chemo infusion, 490 mg (50 % of original dose 500 mg/m2), Intravenous, Once, 3 of 6 cycles  Dose modification: 250 mg/m2 (original dose 500 mg/m2, Cycle 1, Reason: Anticipated Tolerance)  Administration: 500 mg (8/2/2021), 490 mg (8/23/2021)     10/15/2021 - 12/17/2021 Chemotherapy    pegfilgrastim (NEULASTA ONPRO), 6 mg, Subcutaneous, Once, 4 of 8 cycles  Administration: 6 mg (10/15/2021), 6 mg (11/5/2021), 6 mg (11/26/2021), 6 mg (12/17/2021)  fosaprepitant (EMEND) IVPB, 150 mg, Intravenous, Once, 4 of 8 cycles  Administration: 150 mg (10/15/2021), 150 mg (11/5/2021), 150 mg (11/26/2021), 150 mg (12/17/2021)  ramucirumab (CYRAMZA) IVPB, 10 mg/kg = 866 mg, Intravenous, Once, 3 of 3 cycles  Administration: 866 mg (11/5/2021), 900 mg (11/26/2021), 800 mg (12/17/2021)  DOCEtaxel (TAXOTERE) chemo infusion, 75 mg/m2 = 144 mg, Intravenous, Once, 4 of 8 cycles  Dose modification: 60 mg/m2 (original dose 75 mg/m2, Cycle 5, Reason: Anticipated Tolerance)  Administration: 144 mg (10/15/2021), 144 mg (11/5/2021), 144 mg (11/26/2021), 138 mg (12/17/2021)     2/21/2022 -  Chemotherapy    vinORELBine (NAVELBINE) IVPB, 25 mg/m2 = 47 mg (83 3 % of original dose 30 mg/m2), Intravenous, Once, 3 of 6 cycles  Dose modification: 25 mg/m2 (original dose 30 mg/m2, Cycle 1, Reason: Dose modified as per discussion with consulting physician)  Administration: 47 mg (2/21/2022), 47 mg (3/7/2022), 47 mg (3/21/2022), 47 mg (4/4/2022), 47 mg (4/18/2022)     Malignant neoplasm of unspecified part of right bronchus or lung ([address] ) (Resolved)   5/24/2017 Initial Diagnosis    Malignant neoplasm of unspecified part of right bronchus or lung ([address] ) (Resolved)     4/5/2021 - 4/21/2021 Radiation    Course: C4 SBRT    Plan ID Energy Fractions Dose per Fraction (cGy) Dose Correction (cGy) Total Dose Delivered (cGy) Elapsed Days   SBRT LtLung 6X 8 / 8 750 0 6,000 16      Dr Edwin Wilder     Malignant neoplasm of bone with metastases ([facility] [address] )   5/24/2017 Initial Diagnosis    Malignant neoplasm of bone with metastases ([address] )     4/5/2021 - 4/21/2021 Radiation    Course: C4 SBRT    Plan ID Energy Fractions Dose per Fraction (cGy) Dose Correction (cGy) Total Dose Delivered (cGy) Elapsed Days   SBRT LtLung 6X 8 / 8 750 0 6,000 16      Dr Edwin Wilder     10/15/2021 - 12/17/2021 Chemotherapy    pegfilgrastim (Fabby Maria), 6 mg, Subcutaneous, Once, 4 of 8 cycles  Administration: 6 mg (10/15/2021), 6 mg (11/5/2021), 6 mg (11/26/2021), 6 mg (12/17/2021)  fosaprepitant (EMEND) IVPB, 150 mg, Intravenous, Once, 4 of 8 cycles  Administration: 150 mg (10/15/2021), 150 mg (11/5/2021), 150 mg (11/26/2021), 150 mg (12/17/2021)  ramucirumab (CYRAMZA) IVPB, 10 mg/kg = 866 mg, Intravenous, Once, 3 of 3 cycles  Administration: 866 mg (11/5/2021), 900 mg (11/26/2021), 800 mg (12/17/2021)  DOCEtaxel (TAXOTERE) chemo infusion, 75 mg/m2 = 144 mg, Intravenous, Once, 4 of 8 cycles  Dose modification: 60 mg/m2 (original dose 75 mg/m2, Cycle 5, Reason: Anticipated Tolerance)  Administration: 144 mg (10/15/2021), 144 mg (11/5/2021), 144 mg (11/26/2021), 138 mg (12/17/2021)     2/21/2022 -  Chemotherapy    vinORELBine (NAVELBINE) IVPB, 25 mg/m2 = 47 mg (83 3 % of original dose 30 mg/m2), Intravenous, Once, 3 of 6 cycles  Dose modification: 25 mg/m2 (original dose 30 mg/m2, Cycle 1, Reason: Dose modified as per discussion with consulting physician)  Administration: 47 mg (2/21/2022), 47 mg (3/7/2022), 47 mg (3/21/2022), 47 mg (4/4/2022), 47 mg (4/18/2022)     Lung cancer ([facility] [address] )   1/3/2022 Initial Diagnosis    Lung cancer ([facility] [address] )     2/21/2022 -  Chemotherapy    vinORELBine (NAVELBINE) IVPB, 25 mg/m2 = 47 mg (83 3 % of original dose 30 mg/m2), Intravenous, Once, 3 of 6 cycles  Dose modification: 25 mg/m2 (original dose 30 mg/m2, Cycle 1, Reason: Dose modified as per discussion with consulting physician)  Administration: 47 mg (2/21/2022), 47 mg (3/7/2022), 47 mg (3/21/2022), 47 mg (4/4/2022), 47 mg (4/18/2022)         History of Present Illness  :  Ms Sara Prado is a 64 yo female diagnosed with metastatic lung cancer several years ago; has been treated with XRT and systemic therapy, with overall stable disease but recent progression of a lung nodule with planned SBRT  Was most recently on nivo but therapy held 2/2 renal insufficiency  Most recent PET demonstrated non avid 3 3 cm mid body panc cyst, which has increased in size since PET 2018 when it was 1 5 cm  Reviewing CT from July, the cyst has not changed in size  Pt is asymptomatic  Feels well with no abd pain, no weight loss recently, no new onset faigue, no hx jaundice  No hx pancreatitis; hx cholecystectomy many years ago with what she describes as continued intermitent RUQ pain  No DM, no symptoms insufficiency, no RFs for pancreatitis such as alcohol use  We decided to per [facility] for dedicated imaging of the pancreas cyst    Interval History 4/26/22  Since being seen a year ago, the patient has change regimens for her progressive metastatic lung cancer    She has also had a stroke, resulting in trouble with balance and repeat falls for which she is currently using a walker  She continues to deny any abdominal pain, nausea vomiting, symptoms that might be associated with her pancreas cyst   MRI obtained this month demonstrated overall stability since last year  Review of Systems  Complete ROS Surg Onc:   Constitutional: The patient denies new or recent history of general fatigue, no recent weight loss, no change in appetite  Eyes: No complaints of visual problems, no scleral icterus  ENT: No complaints of ear pain, no hoarseness, no difficulty swallowing,  no tinnitus and no new masses in head, oral cavity, or neck  Cardiovascular: No complaints of chest pain, no palpitations, no ankle edema  Respiratory: No complaints of shortness of breath, no cough  Gastrointestinal: No complaints of jaundice, no bloody stools, no pale stools  Genitourinary: No complaints of dysuria, no hematuria, no nocturia, no frequent urination, no urethral discharge  Musculoskeletal: No complaints of weakness, paralysis, joint stiffness or arthralgias  Integumentary: No complaints of rash, no new lesions  Neurological: No complaints of convulsions, no seizures, no dizziness  Hematologic/Lymphatic: No complaints of easy bruising  Endocrine:  No hot or cold intolerance  No polydipsia, polyphagia, or polyuria  Allergy/immunology:  No environmental allergies  No food allergies  Not immunocompromised  Skin:  No pallor or rash  No wound        Patient Active Problem List   Diagnosis    Paroxysmal atrial fibrillation (HCC)    Primary hypertension    Adenocarcinoma of lung, stage 4 (HCC)    Chronic diastolic heart failure ([address] )    Cancer related pain    Diabetes mellitus type 2, uncomplicated ([address] )    Malignant neoplasm of bone with metastases ([address] )    Acid reflux    Hyperlipidemia    Major depressive disorder with single episode, in full remission ([address] )    Vitamin D deficiency    Adhesive capsulitis of shoulder    Anxiety    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)    CINV (chemotherapy-induced nausea and vomiting)    Cyst of pancreas    Pneumonitis    Anemia in stage 3 chronic kidney disease ([place] [address] )    Palliative care patient    Hypercalcemia    Medicare annual wellness visit, subsequent    Chemotherapy induced neutropenia ([address] [address] )    Former smoker    Lung cancer ([place] [address] )    Depression    History of stroke    GERD (gastroesophageal reflux disease)    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[place] [address] )    Dyslipidemia    Cognitive decline     Past Medical History:   Diagnosis Date    A-fib ([place] [address] )     Arthritis     Atrial fibrillation ([address] [address] )     Confusion     Diabetes mellitus ([place] [address] )     Diabetes mellitus type 2, uncomplicated (HCC)     Last assessed: 8/17/17    Encephalopathy 1/6/2022    Essential hypertension     Frozen shoulder     L shoulder    GERD (gastroesophageal reflux disease)     Hx of cancer of uterus     Last assessed: 8/21/15    Hyperlipidemia     Hypertension     Hyponatremia 11/16/2016    Lung mass     diagnosed 9/2016    Malignant neoplasm without specification of site ([place] [address] )     Skin cancer, basal cell     right eye area    Stage 4 lung cancer ([place] [address] )      Past Surgical History:   Procedure Laterality Date    APPENDECTOMY      BRONCHOSCOPY N/A 11/17/2016    Procedure: BRONCHOSCOPY FLEXIBLE;  Surgeon: Christina Lamas MD;  [facility];  Service:    [facility] CHOLECYSTECTOMY      COLONOSCOPY      COLONOSCOPY      FL GUIDED CENTRAL VENOUS ACCESS DEVICE INSERTION  12/14/2021    GALLBLADDER SURGERY      HYSTERECTOMY  2000    Total abdominal    JOINT REPLACEMENT      LARYNGOSCOPY      Flexible Fiberoptic, (Therapeutic), Resolved: 11/17/16    MOHS SURGERY      Micrographic Surgery Face    VA BRONCHOSCOPY NEEDLE BX TRACHEA MAIN STEM&/BRON N/A 11/17/2016    Procedure: EBUS; FROZEN SECTION ;  Surgeon: Christina Lamas MD; [facility];  Service: Thoracic    KS BRONCHOSCOPY,DIAGNOSTIC N/A 5/24/2017    Procedure: Kamron Estrada;  Surgeon: Mena Lloyd MD;  Location: [facility]; Service: Pulmonary    KS INSJ TUNNELED CTR VAD W/SUBQ PORT AGE 5 YR/> N/A 12/14/2021    Procedure: INSERTION VENOUS PORT ( PORT-A-CATH) IR;  Surgeon: Chad Prajapati DO;  [facility];  Service: Interventional Radiology    TONSILECTOMY AND ADNOIDECTOMY      TONSILLECTOMY      TOTAL KNEE ARTHROPLASTY Right 09/23/2014     Family History   Problem Relation Age of Onset    Heart disease Father         cardiac disorder    Hypertension Father     Arthritis Father     Stroke Father         cerebrovascular accident    Skin cancer Father     Diabetes Mother     Heart disease Mother    [place] Dementia Mother     Hypertension Mother     Thyroid disease Mother     Cancer Maternal Grandfather         of unknown origin    Cancer Family     Depression Family     Diabetes Family     Hyperlipidemia Family         essential    Heart disease Family     Hypertension Family     Stroke Family         syndrome    Lung cancer Paternal Uncle     Muscular dystrophy Brother      Social History     Socioeconomic History    Marital status:      Spouse name: Not on file    Number of children: Not on file    Years of education: Not on file    Highest education level: Not on file   Occupational History    Occupation: retired   Tobacco Use    Smoking status: Never Smoker    Smokeless tobacco: Never Used    Tobacco comment: Quit in 2000   Vaping Use    Vaping Use: Never used   Substance and Sexual Activity    Alcohol use: No    Drug use: No    Sexual activity: Not Currently   Other Topics Concern    Not on file   Social History Narrative    ** Merged History Encounter **         Family dynamics: Supportive  Relationship status:     Children and Ages:1 adult dtr Mikael Lacrosse, 1 adult son Sandy Boyd  Other important family information: Caregiver for her brother Phi Chery, who is disabled  Living situation: Lives with brother        Employm    ent history/source of income: Worked as a  for [employer] prior to half-way   Spirituality/ Sabianist: Protestant    Patient's strengths, social supports, and resources: Supportive family  Hobbies/Interests: Sewing; Crafting  Advanced Direct[employer]    e: States dtr Terell Miller is her POA       Social Determinants of Health     Financial Resource Strain: Not on file   Food Insecurity: Not on file   Transportation Needs: Not on file   Physical Activity: Not on file   Stress: Not on file   Social Connections: Not on file   Intimate Partner Violence: Not on file   Housing Stability: Not on file       Current Outpatient Medications:     acetaminophen (TYLENOL) 325 mg tablet, Take 650 mg by mouth every 6 (six) hours as needed for mild pain, Disp: , Rfl:     apixaban (Eliquis) 5 mg, TAKE 1 TABLET TWICE A DAY, Disp: 180 tablet, Rfl: 3    aspirin (ECOTRIN LOW STRENGTH) 81 mg EC tablet, Take 1 tablet (81 mg total) by mouth daily, Disp: 30 tablet, Rfl: 0    atorvastatin (LIPITOR) 40 mg tablet, TAKE 1 TABLET BY MOUTH EVERY DAY, Disp: 30 tablet, Rfl: 1    busPIRone (BUSPAR) 7 5 mg tablet, Take 1 tablet (7 5 mg total) by mouth 2 (two) times a day, Disp: 60 tablet, Rfl: 2    cyanocobalamin (VITAMIN B-12) 100 mcg tablet, Take by mouth daily, Disp: , Rfl:     folic acid (FOLVITE) 1 mg tablet, Take 1 tablet (1 mg total) by mouth daily, Disp: 90 tablet, Rfl: 1    furosemide (LASIX) 20 mg tablet, TAKE 1 TABLET (20 MG TOTAL) BY MOUTH DAILY AS NEEDED (LEG EDEMA), Disp: 30 tablet, Rfl: 0    Klor-Con M10 10 MEQ tablet, TAKE 1 TABLET BY MOUTH EVERY DAY, Disp: 30 tablet, Rfl: 5    lidocaine-prilocaine (EMLA) cream, Apply to port site 30 minutes prior to infusion and cover with a dressing, Disp: 30 g, Rfl: 1    linaGLIPtin (Tradjenta) 5 MG TABS, Take 5 mg by mouth daily, Disp: 90 tablet, Rfl: 3    loperamide (IMODIUM) 2 mg capsule, Take 2 mg by mouth 4 (four) times a day as needed for diarrhea  , Disp: , Rfl:     loratadine (CLARITIN) 10 mg tablet, Take 10 mg by mouth as needed  , Disp: , Rfl:     metFORMIN (GLUCOPHAGE) 1000 MG tablet, Take 0 5 tablets (500 mg total) by mouth 2 (two) times a day with meals, Disp: 180 tablet, Rfl: 3    metoprolol tartrate (LOPRESSOR) 25 mg tablet, Take 1 tablet (25 mg total) by mouth every 12 (twelve) hours, Disp: 180 tablet, Rfl: 3    omeprazole (PriLOSEC) 20 mg delayed release capsule, Take 1 capsule (20 mg total) by mouth daily, Disp: 90 capsule, Rfl: 3    oxyCODONE (Roxicodone) 5 immediate release tablet, Take 1 tablet (5 mg total) by mouth every 6 (six) hours as needed for moderate pain or severe pain Max Daily Amount: 20 mg, Disp: 30 tablet, Rfl: 0    sotalol (BETAPACE) 80 mg tablet, Take 1 tablet (80 mg total) by mouth 2 (two) times a day, Disp: 180 tablet, Rfl: 3    valsartan (DIOVAN) 160 mg tablet, Take 1 tablet (160 mg total) by mouth 2 (two) times a day, Disp: 60 tablet, Rfl: 3    venlafaxine (EFFEXOR) 37 5 mg tablet, Take 1 tablet (37 5 mg total) by mouth daily, Disp: 90 tablet, Rfl: 3  Allergies   Allergen Reactions    Amoxicillin Hives    Amoxicillin Rash and Hives    Cardizem [Diltiazem] Rash     Rash      Statins Myalgia     Severe muscle aching  Terrible pains    Zofran [Ondansetron] Palpitations       Vitals:    04/26/22 0945   BP: 120/78   Pulse: 68   Resp: 18   Temp: (!) 96 8 °F (36 °C)   SpO2: 96%       Physical Exam   General: Appears well, appears stated age  Skin: Warm, anicteric  HEENT: Normocephalic, atraumatic; sclera aniceteric, mucous membranes moist; cervical nodes without adenopathy  Cardiopulmonary: RRR, Easy WOB, no BLE edema  Abd: Flat and soft, nontender, no masses appreciated, no hepatosplenomegaly   Well healed lower midline and Pfan incision 2/2 previous hyst for endometrial cancer  MSK: Symmetric, no cyanosis, no overt weakness  Lymphatic: No cervical, axillary or inguinal lymphadenopathy  Neuro: Affect appropriate, no gross motor abnormalities      Pathology:  Reviewed lung pathology    Labs: Reviewed    Imaging  Ct Chest Wo Contrast    Result Date: 2/27/2021  Narrative: CT CHEST WITHOUT IV CONTRAST INDICATION:   C34 92: Malignant neoplasm of unspecified part of left bronchus or lung  Known history of stage IV adenocarcinoma of the lung with left scapular involvement  Status post radiation therapy to the left scapula  Currently maintained on immuno-oncologic therapy  Surveillance  COMPARISON:  CT, dated November 17, 2020  TECHNIQUE: CT examination of the chest was performed without intravenous contrast   Axial, sagittal, and coronal 2D reformatted images were created from the source data and submitted for interpretation  Radiation dose length product (DLP) for this visit:  478 mGy-cm   This examination, like all CT scans performed in the [facility], was performed utilizing techniques to minimize radiation dose exposure, including the use of iterative reconstruction and automated exposure control  FINDINGS: LUNGS:  Redemonstrated are multiple pulmonary nodules  The dominant focus in the left upper lobe measures approximately 22 x 15 mm in greatest transverse dimensions (series 3, image 41), previously 21 x 15 mm  A more peripheral focus also in the left upper lobe measures 13 x 12 mm (series 3, image 43), previously 13 x 13 mm  Nodularity in the right costophrenic sulcus measures 16 x 12 mm in transverse dimensions (series 3, image 98), previously 15 x 11 mm  A left lower lobe pleural nodule measures 10 x 8 mm in greatest transverse dimensions (series 3, image 90), previously 9 x 8 mm  A 4 mm right lower lobe pulmonary nodule is stable (series 3, image 79)  There are no new pulmonary nodules  Right middle lobar atelectasis is presumed secondary to radiation fibrosis, stable    There are mild new bibasilar areas of mild groundglass opacity, nonspecific  A small amount of debris is present in the upper trachea  The endobronchial tree is under otherwise normal  PLEURA:  Unremarkable  HEART/GREAT VESSELS:  Atherosclerotic changes are noted in thoracic aorta and coronary arteries  MEDIASTINUM AND ANABEL:  Unremarkable  CHEST WALL AND LOWER NECK:   Unremarkable  VISUALIZED STRUCTURES IN THE UPPER ABDOMEN:  Redemonstrated is the incompletely characterized cystic mass in the body of the pancreas, measuring 30 x 30 mm in greatest transverse dimensions (series 2, image 61), previously 30 x 29 mm  OSSEOUS STRUCTURES:  Spinal degenerative changes are noted  No acute fracture or destructive osseous lesion  Sclerosis of the left scapular body corresponding to known metastatic disease is stable  Impression: 1  Stable pulmonary nodules  2   Interval development of mild bibasilar ground glass opacities  Though nonspecific, aspiration could be a possible source  Continued follow-up on subsequent oncologic surveillance is recommended  3   Stable size of the incompletely characterized cystic pancreatic mass  Once again, MRI utilizing a pancreatic mass protocol is recommended for complete characterization  The study was marked in EPIC for significant notification  Workstation performed: [id number]     Nm Pet Ct Skull Base To Mid Thigh    Result Date: 3/12/2021  Narrative: PET/CT SCAN INDICATION:  C34 12: Malignant neoplasm of upper lobe, left bronchus or lung C34 90: Malignant neoplasm of unspecified part of unspecified bronchus or lung   , restaging for treatment management, history of radiation to left scapula for metastasis, history of endometrial cancer, status post RICHARD/BSO 2000, Covid vaccination 1 in left arm 2/20/2021 MODIFIER: PS COMPARISON: CT chest 2/22/2021 and priors, including PET CT 10/20/2018 CELL TYPE:  Adenocarcinoma, T10 bone biopsy 10/18/2016 TECHNIQUE:   10 5 mCi [id number] administered IV   Multiplanar attenuation corrected and non attenuation corrected PET images were acquired 60 minutes post injection  Contiguous, low dose, axial CT sections were obtained from the skull base through the femurs   Intravenous contrast material was not utilized  This examination, like all CT scans performed in the [facility] [facility], was performed utilizing techniques to minimize radiation dose exposure, including the use of iterative reconstruction and automated exposure control  Fasting serum glucose: 109 mg/dl FINDINGS: VISUALIZED BRAIN:   No acute abnormalities are seen  HEAD/NECK:   There is a physiologic distribution of FDG  No FDG avid cervical adenopathy is seen  CT images: Scattered sinus mucosal thickening  CHEST:   Dominant left upper lung lesion image 3/84 has slowly increased in size and FDG intensity since the prior PET/CT, currently measuring approximately 2 7 x 1 5 cm, SUV 4 7  Prior PET/CT measurement 2 9 x 1 3 cm, SUV 2 4  This is most concerning for viable tumor  Enlarging adjacent left upper lung nodule image 3/83 measuring 1 2 x 1 1 cm  Prior PET/CT measurement 3 mm  This does not demonstrate significant FDG uptake, SUV 0 9  However a low-grade neoplasm remains possible  Persistent nodular infiltrate in the right medial lung base  This also demonstrates slow interval growth since prior studies  This currently measures 2 8 x 1 3 cm on image 3/110  SUV measures 4 8  Prior PET/CT measurement approximately 2 8 x 1 cm, though SUV measurement is limited due to adjacent liver activity, 2 3  This is nonspecific and may be inflammatory/infectious, with neoplasm not entirely excluded  Left lung base nodule measuring 8 x 7 mm image 3/109 appears stable and does not demonstrate significant FDG uptake  This would favor a benign etiology  CT images: Coronary atherosclerosis  Stable thickening along the right minor fissure  ABDOMEN: Enlarging pancreatic cystic lesion without significant FDG uptake    This measures 3 3 x 2 7 cm  Prior PET/CT measurement 1 5 x 1 5 cm  Bowel activity is likely physiologic  No FDG avid soft tissue lesions are seen  CT images: Cholecystectomy  Right renal cortical scarring  PELVIS: No FDG avid soft tissue lesions are seen  CT images: Hysterectomy  OSSEOUS STRUCTURES: Left scapular lytic lesion again noted with significantly decreased FDG uptake, SUV 2 4, compatible with treated metastasis  Prior SUV 14 7  No new FDG avid lesions are seen  CT images: Spine degenerative change  Mild lumbar levoscoliosis  Impression: 1  Slowly enlarging dominant left upper lung lesion with increasing FDG uptake, most concerning for viable tumor  2   Enlarging adjacent left upper lung nodule  Although this does not demonstrate significant FDG uptake, this remains concerning for a low-grade neoplasm  Consider tissue sampling if this would alter clinical management  3   Slowly increasing nodular infiltrate in the right medial lung base with increased FDG uptake  This may be inflammatory/infectious, with neoplasm also not excluded  Close follow-up or tissue sampling also suggested  4   Enlarging pancreatic cystic lesion without significant FDG uptake  Dedicated contrast MR evaluation suggested for further evaluation  The study was marked in EPIC for significant notification  Workstation performed: [id number]    CT 4/2022  Stable 2 9 cm pancreatic body cystic lesion is noted  MR 4/18/22  IMPRESSION:     1  No significant interval change in numerous cystic 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  The largest is a thinly septated cystic lesion in the pancreatic body measuring 3 2 cm similar to prior  No development of solid   components or main pancreatic ductal dilation  For simple cyst(s) 2 cm or greater recommend gastroenterology and/or surgical oncology consult and consideration for EUS       Recommendations are based on recent consensus statements on management of pancreatic cystic lesions from [employer]astro[employer] Association, [address] [facility] of Radiology, the Journal of Pancreatology, and our own institutional consensus  I reviewed and independently interpreted the above laboratory and imaging data  Discussion/Summary: 64 yo female with stable panc mid-body cyst - one year from her previous study  Since that time, the patient has been started on new treatment for her progressive stage IV lung cancer, and has had a stroke resulting in decline in her functional status now requiring a walker  We discussed again that panc cysts can range from entirely benign to premalignant or even harbor malignancy  Described that in the context of metastatic lung cancer, we are unlikely to pursue surgical treatment for a pancreatic cyst, and I do not think it makes sense to continue Sitz pancreas specific surveillance  She continues to see her Medical Oncology team for ongoing management of her lung cancer  Should her interval CT scans demonstrate anything concerning within the pancreas in terms of cyst change, we can readdress at that time  She agrees that she is very unlikely to pursue any type of surgery for a pancreas cyst given the status of her lung cancer and function  I will communicate this to her medical oncology team and she can follow up p r n

## 2022-04-28 ENCOUNTER — TELEPHONE (OUTPATIENT)
Dept: LAB | Facility: HOSPITAL | Age: 72
End: 2022-04-28

## 2022-04-29 ENCOUNTER — APPOINTMENT (OUTPATIENT)
Dept: LAB | Facility: AMBULARY SURGERY CENTER | Age: 72
End: 2022-04-29
Payer: MEDICARE

## 2022-04-29 DIAGNOSIS — T45.1X5A CINV (CHEMOTHERAPY-INDUCED NAUSEA AND VOMITING): ICD-10-CM

## 2022-04-29 DIAGNOSIS — C41.9 MALIGNANT NEOPLASM OF BONE WITH METASTASES (HCC): ICD-10-CM

## 2022-04-29 DIAGNOSIS — R11.2 CINV (CHEMOTHERAPY-INDUCED NAUSEA AND VOMITING): ICD-10-CM

## 2022-04-29 DIAGNOSIS — T45.1X5A CHEMOTHERAPY INDUCED NEUTROPENIA (HCC): ICD-10-CM

## 2022-04-29 DIAGNOSIS — C34.92 ADENOCARCINOMA OF LEFT LUNG, STAGE 4 (HCC): ICD-10-CM

## 2022-04-29 DIAGNOSIS — C34.12 MALIGNANT NEOPLASM OF UPPER LOBE OF LEFT LUNG (HCC): ICD-10-CM

## 2022-04-29 DIAGNOSIS — E83.52 HYPERCALCEMIA: ICD-10-CM

## 2022-04-29 DIAGNOSIS — D70.1 CHEMOTHERAPY INDUCED NEUTROPENIA (HCC): ICD-10-CM

## 2022-04-29 LAB
BASOPHILS # BLD AUTO: 0.07 THOUSANDS/ΜL (ref 0–0.1)
BASOPHILS NFR BLD AUTO: 1 % (ref 0–1)
EOSINOPHIL # BLD AUTO: 0.24 THOUSAND/ΜL (ref 0–0.61)
EOSINOPHIL NFR BLD AUTO: 5 % (ref 0–6)
ERYTHROCYTE [DISTWIDTH] IN BLOOD BY AUTOMATED COUNT: 14.6 % (ref 11.6–15.1)
HCT VFR BLD AUTO: 36.4 % (ref 34.8–46.1)
HGB BLD-MCNC: 11.2 G/DL (ref 11.5–15.4)
IMM GRANULOCYTES # BLD AUTO: 0.03 THOUSAND/UL (ref 0–0.2)
IMM GRANULOCYTES NFR BLD AUTO: 1 % (ref 0–2)
LYMPHOCYTES # BLD AUTO: 1.15 THOUSANDS/ΜL (ref 0.6–4.47)
LYMPHOCYTES NFR BLD AUTO: 21 % (ref 14–44)
MCH RBC QN AUTO: 27.6 PG (ref 26.8–34.3)
MCHC RBC AUTO-ENTMCNC: 30.8 G/DL (ref 31.4–37.4)
MCV RBC AUTO: 90 FL (ref 82–98)
MONOCYTES # BLD AUTO: 0.39 THOUSAND/ΜL (ref 0.17–1.22)
MONOCYTES NFR BLD AUTO: 7 % (ref 4–12)
NEUTROPHILS # BLD AUTO: 3.51 THOUSANDS/ΜL (ref 1.85–7.62)
NEUTS SEG NFR BLD AUTO: 65 % (ref 43–75)
NRBC BLD AUTO-RTO: 0 /100 WBCS
PLATELET # BLD AUTO: 253 THOUSANDS/UL (ref 149–390)
PMV BLD AUTO: 11 FL (ref 8.9–12.7)
RBC # BLD AUTO: 4.06 MILLION/UL (ref 3.81–5.12)
WBC # BLD AUTO: 5.39 THOUSAND/UL (ref 4.31–10.16)

## 2022-04-29 PROCEDURE — 36415 COLL VENOUS BLD VENIPUNCTURE: CPT

## 2022-04-29 PROCEDURE — 85025 COMPLETE CBC W/AUTO DIFF WBC: CPT

## 2022-05-02 ENCOUNTER — HOSPITAL ENCOUNTER (OUTPATIENT)
Dept: INFUSION CENTER | Facility: CLINIC | Age: 72
Discharge: HOME | End: 2022-05-02
Payer: MEDICARE

## 2022-05-02 ENCOUNTER — TELEPHONE (OUTPATIENT)
Dept: HEMATOLOGY ONCOLOGY | Facility: CLINIC | Age: 72
End: 2022-05-02

## 2022-05-02 VITALS
HEIGHT: 64 IN | SYSTOLIC BLOOD PRESSURE: 117 MMHG | TEMPERATURE: 97.3 F | DIASTOLIC BLOOD PRESSURE: 64 MMHG | HEART RATE: 64 BPM | OXYGEN SATURATION: 98 % | RESPIRATION RATE: 18 BRPM | BODY MASS INDEX: 29.02 KG/M2 | WEIGHT: 170 LBS

## 2022-05-02 DIAGNOSIS — T45.1X5A CINV (CHEMOTHERAPY-INDUCED NAUSEA AND VOMITING): ICD-10-CM

## 2022-05-02 DIAGNOSIS — C41.9 MALIGNANT NEOPLASM OF BONE WITH METASTASES (HCC): ICD-10-CM

## 2022-05-02 DIAGNOSIS — R11.2 CINV (CHEMOTHERAPY-INDUCED NAUSEA AND VOMITING): ICD-10-CM

## 2022-05-02 DIAGNOSIS — C34.12 MALIGNANT NEOPLASM OF UPPER LOBE OF LEFT LUNG (HCC): ICD-10-CM

## 2022-05-02 DIAGNOSIS — T45.1X5A CHEMOTHERAPY INDUCED NEUTROPENIA (HCC): ICD-10-CM

## 2022-05-02 DIAGNOSIS — E83.52 HYPERCALCEMIA: Primary | ICD-10-CM

## 2022-05-02 DIAGNOSIS — D70.1 CHEMOTHERAPY INDUCED NEUTROPENIA (HCC): ICD-10-CM

## 2022-05-02 DIAGNOSIS — C34.92 ADENOCARCINOMA OF LEFT LUNG, STAGE 4 (HCC): ICD-10-CM

## 2022-05-02 PROCEDURE — 96409 CHEMO IV PUSH SNGL DRUG: CPT

## 2022-05-02 PROCEDURE — 96367 TX/PROPH/DG ADDL SEQ IV INF: CPT

## 2022-05-02 RX ORDER — SODIUM CHLORIDE 9 MG/ML
20 INJECTION, SOLUTION INTRAVENOUS ONCE
Status: COMPLETED | OUTPATIENT
Start: 2022-05-02 | End: 2022-05-02

## 2022-05-02 RX ADMIN — VINORELBINE 47 MG: 10 INJECTION, SOLUTION INTRAVENOUS at 14:30

## 2022-05-02 RX ADMIN — SODIUM CHLORIDE 20 ML/HR: 9 INJECTION, SOLUTION INTRAVENOUS at 13:30

## 2022-05-02 RX ADMIN — ONDANSETRON 8 MG: 2 INJECTION INTRAMUSCULAR; INTRAVENOUS at 13:45

## 2022-05-02 NOTE — TELEPHONE ENCOUNTER
Patient calling in, verify if she is on the schedule for today for [facility]  for her 1:30 pm infusion appointment  I was able to contact [facility] and confirm patient is on the schedule for today , and that  the  will contact patient shortly for

## 2022-05-05 ENCOUNTER — SOCIAL WORK (OUTPATIENT)
Dept: PALLIATIVE MEDICINE | Facility: CLINIC | Age: 72
End: 2022-05-05
Payer: MEDICARE

## 2022-05-05 ENCOUNTER — OFFICE VISIT (OUTPATIENT)
Dept: PALLIATIVE MEDICINE | Facility: CLINIC | Age: 72
End: 2022-05-05
Payer: MEDICARE

## 2022-05-05 VITALS
HEART RATE: 66 BPM | TEMPERATURE: 96.4 F | DIASTOLIC BLOOD PRESSURE: 60 MMHG | BODY MASS INDEX: 29.53 KG/M2 | HEIGHT: 64 IN | SYSTOLIC BLOOD PRESSURE: 118 MMHG | WEIGHT: 173 LBS | RESPIRATION RATE: 12 BRPM | OXYGEN SATURATION: 99 %

## 2022-05-05 DIAGNOSIS — T45.1X5A NEUROPATHY DUE TO CHEMOTHERAPEUTIC DRUG (HCC): ICD-10-CM

## 2022-05-05 DIAGNOSIS — G89.3 CANCER RELATED PAIN: Chronic | ICD-10-CM

## 2022-05-05 DIAGNOSIS — T45.1X5A CINV (CHEMOTHERAPY-INDUCED NAUSEA AND VOMITING): Chronic | ICD-10-CM

## 2022-05-05 DIAGNOSIS — F41.9 ANXIETY: ICD-10-CM

## 2022-05-05 DIAGNOSIS — I50.32 CHRONIC DIASTOLIC HEART FAILURE (HCC): ICD-10-CM

## 2022-05-05 DIAGNOSIS — C34.90 ADENOCARCINOMA OF LUNG, STAGE 4, UNSPECIFIED LATERALITY (HCC): Primary | ICD-10-CM

## 2022-05-05 DIAGNOSIS — R11.2 CINV (CHEMOTHERAPY-INDUCED NAUSEA AND VOMITING): Chronic | ICD-10-CM

## 2022-05-05 DIAGNOSIS — K21.9 GASTROESOPHAGEAL REFLUX DISEASE WITHOUT ESOPHAGITIS: ICD-10-CM

## 2022-05-05 DIAGNOSIS — C79.52 SECONDARY MALIGNANT NEOPLASM OF BONE AND BONE MARROW (HCC): ICD-10-CM

## 2022-05-05 DIAGNOSIS — C79.51 SECONDARY MALIGNANT NEOPLASM OF BONE AND BONE MARROW (HCC): ICD-10-CM

## 2022-05-05 DIAGNOSIS — G62.0 NEUROPATHY DUE TO CHEMOTHERAPEUTIC DRUG (HCC): ICD-10-CM

## 2022-05-05 DIAGNOSIS — Z86.73 HISTORY OF STROKE: ICD-10-CM

## 2022-05-05 DIAGNOSIS — F32.5 MAJOR DEPRESSIVE DISORDER WITH SINGLE EPISODE, IN FULL REMISSION (HCC): ICD-10-CM

## 2022-05-05 DIAGNOSIS — Z51.5 PALLIATIVE CARE PATIENT: ICD-10-CM

## 2022-05-05 DIAGNOSIS — Z71.89 COUNSELING AND COORDINATION OF CARE: Primary | ICD-10-CM

## 2022-05-05 PROCEDURE — NC001 PR NO CHARGE

## 2022-05-05 PROCEDURE — 99214 OFFICE O/P EST MOD 30 MIN: CPT | Performed by: INTERNAL MEDICINE

## 2022-05-05 NOTE — PATIENT INSTRUCTIONS
Thank you for coming in     · I'm glad to see you're doing well! It's great to see you  · Return in about 6 months  · Call us for refills on medications that we supply, as needed  · If something changes and you need to come in sooner, please call our office  PRESCRIPTION REFILL REMINDER:  All medication refills should be requested prior to [facility] on Friday  Any refill requests after noon on Friday would be addressed the following Monday

## 2022-05-05 NOTE — PROGRESS NOTES
Follow-up with Palliative and Supportive Care  Enrique Griffith 66 y o  female [id number]    ASSESSMENT & PLAN:  1  Adenocarcinoma of lung, stage 4, unspecified laterality ([facility] )    2  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)    3  Chronic diastolic heart failure ([facility] )    4  History of stroke    5  Major depressive disorder with single episode, in full remission ([facility] )    6  Anxiety    7  Gastroesophageal reflux disease without esophagitis    8  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[facility] )    9  Cancer related pain    10  CINV (chemotherapy-induced nausea and vomiting)    11  Palliative care patient           Patient reports pain has not been a significant problem since her last visit  She is using Tylenol PRN knee pain, and has not needed to use prescribed oxycodone  Continue current regimen   Recommended patient discuss her knee pain and R hand 3rd digit trigger finger w/ OAA; she is due for a knee injection but has been hesitant to schedule it  She declines offer of referral to hand specialist    Patient endorses 1 day of chemo-induced diarrhea and nausea; she has not needed to take medications for this   Patient reports her mood is stable and her family is supportive   Patient has received [address] [facility] vaccinations   Reviewed notes (SW, FM, Surgical Oncology, Medical Oncology), labs (4/18/22 Cr 1 62, eGFR 31, Hb 10 8), imaging (4/18/22 CT chest + MRI abdomen)   Return in about 6 months (around 11/5/2022)   Emotional support provided   Medication safety issues addressed - no driving under the influence of narcotics, watch for adverse effects including AMS and respiratory depression, keep medications stored in a safe/locked environment  Requested Prescriptions      No prescriptions requested or ordered in this encounter       There are no discontinued medications      Representatives have queried the patient's controlled substance dispensing history in the Prescription Drug Monitoring Program in compliance with regulations before I have prescribed any controlled substances  The prescription history is consistent with prescribed therapy and our practice policies  30+ minutes were spent in this ambulatory visit with greater than 50% of the time spent face to face with patient in counseling or coordination of care including discussions of symptom assessment and management, medication review, psychosocial support, chart review, imaging review, lab review, supportive listening and anticipatory guidance  All of the patient's questions were answered during this discussion  SUBJECTIVE:  Chief Complaint   Patient presents with    Follow-up    Cancer    Cancer Pain    Anxiety    Depression    Counseling        HPI    Ether He is a 66 y o  female w/ stage IV non-small cell lung cancer (diagnosed 2016) w/ osseous metastasis s/p chemotherapy + immunotherapy + RT, DM2, HFpEF, Afib, CKD, HTN+HLD, h/o CVA  She follows w/ Serge Kitchen PA-C + Dr Stiven López (Medical Oncology), [facility]  Plan includes systemic therapy w/ vinorelbine  Patient is known to [facility] [place] [facility]; last seen by me 3/7/22 for symptom management, psychosocial support  Patient reports she has been feeling well in recent weeks  Though she endorses chemo-induced diarrhea and nausea on the first day after treatment, she has been loathe to take "another medication" to control symptoms and has been able to tolerate the transient GI issues  She denies significant cancer-related pain, and states PRN Tylenol has been helpful for her chronic knee pain  She has been hesitant to get a repeat knee injection as the injection procedure can be tough to tolerate  She has not needed to take prescribed oxyIR  Patient notes some issues w/ the middle finger of her R hand, stating it will get "stuck" in the flexed position; she is describing something similar to "trigger finger"       Patient reports her appetite is good  Her mood has been positive  PDMP shows no concerns  The following portions of the medical history were reviewed: past medical history, surgical history, problem list, medication list, family history, and social history        Current Outpatient Medications:     acetaminophen (TYLENOL) 325 mg tablet, Take 650 mg by mouth every 6 (six) hours as needed for mild pain, Disp: , Rfl:     apixaban (Eliquis) 5 mg, TAKE 1 TABLET TWICE A DAY, Disp: 180 tablet, Rfl: 3    aspirin (ECOTRIN LOW STRENGTH) 81 mg EC tablet, Take 1 tablet (81 mg total) by mouth daily, Disp: 30 tablet, Rfl: 0    atorvastatin (LIPITOR) 40 mg tablet, TAKE 1 TABLET BY MOUTH EVERY DAY, Disp: 30 tablet, Rfl: 1    busPIRone (BUSPAR) 7 5 mg tablet, Take 1 tablet (7 5 mg total) by mouth 2 (two) times a day, Disp: 60 tablet, Rfl: 2    cyanocobalamin (VITAMIN B-12) 100 mcg tablet, Take by mouth daily, Disp: , Rfl:     folic acid (FOLVITE) 1 mg tablet, Take 1 tablet (1 mg total) by mouth daily, Disp: 90 tablet, Rfl: 1    furosemide (LASIX) 20 mg tablet, TAKE 1 TABLET (20 MG TOTAL) BY MOUTH DAILY AS NEEDED (LEG EDEMA), Disp: 30 tablet, Rfl: 0    Klor-Con M10 10 MEQ tablet, TAKE 1 TABLET BY MOUTH EVERY DAY, Disp: 30 tablet, Rfl: 5    lidocaine-prilocaine (EMLA) cream, Apply to port site 30 minutes prior to infusion and cover with a dressing, Disp: 30 g, Rfl: 1    linaGLIPtin (Tradjenta) 5 MG TABS, Take 5 mg by mouth daily, Disp: 90 tablet, Rfl: 3    loperamide (IMODIUM) 2 mg capsule, Take 2 mg by mouth 4 (four) times a day as needed for diarrhea  , Disp: , Rfl:     loratadine (CLARITIN) 10 mg tablet, Take 10 mg by mouth as needed  , Disp: , Rfl:     metFORMIN (GLUCOPHAGE) 1000 MG tablet, Take 0 5 tablets (500 mg total) by mouth 2 (two) times a day with meals, Disp: 180 tablet, Rfl: 3    metoprolol tartrate (LOPRESSOR) 25 mg tablet, Take 1 tablet (25 mg total) by mouth every 12 (twelve) hours, Disp: 180 tablet, Rfl: 3    omeprazole (PriLOSEC) 20 mg delayed release capsule, Take 1 capsule (20 mg total) by mouth daily, Disp: 90 capsule, Rfl: 0    oxyCODONE (Roxicodone) 5 immediate release tablet, Take 1 tablet (5 mg total) by mouth every 6 (six) hours as needed for moderate pain or severe pain Max Daily Amount: 20 mg, Disp: 30 tablet, Rfl: 0    sotalol (BETAPACE) 80 mg tablet, Take 1 tablet (80 mg total) by mouth 2 (two) times a day, Disp: 180 tablet, Rfl: 3    valsartan (DIOVAN) 160 mg tablet, Take 1 tablet (160 mg total) by mouth 2 (two) times a day, Disp: 60 tablet, Rfl: 3    venlafaxine (EFFEXOR) 37 5 mg tablet, Take 1 tablet (37 5 mg total) by mouth daily, Disp: 90 tablet, Rfl: 3    Review of Systems   Constitutional: Positive for activity change (improved) and fatigue (improved)  Negative for appetite change and unexpected weight change (defending her weight in recent months)  HENT: Negative for trouble swallowing  Eyes: Negative for pain and redness  Gastrointestinal: Positive for diarrhea (first day after chemotherapy) and nausea (first day after chemotherapy)  Negative for abdominal pain  Endocrine: Negative for polydipsia and polyphagia  Musculoskeletal: Positive for arthralgias and gait problem  Allergic/Immunologic: Positive for immunocompromised state  Neurological: Negative for facial asymmetry and speech difficulty  Psychiatric/Behavioral: Negative for dysphoric mood and sleep disturbance  The patient is not nervous/anxious  OBJECTIVE:  /60 (BP Location: Left arm, Patient Position: Sitting, Cuff Size: Standard)   Pulse 66   Temp (!) 96 4 °F (35 8 °C) (Temporal)   Resp 12   Ht 5' 4" (1 626 m)   Wt 78 5 kg (173 lb)   LMP  (LMP Unknown)   SpO2 99%   BMI 29 70 kg/m²   Physical Exam  Vitals reviewed  Constitutional:       General: She is not in acute distress  Appearance: She is ill-appearing (chronically)  She is not toxic-appearing  HENT:      Head: Normocephalic and atraumatic  Right Ear: External ear normal       Left Ear: External ear normal    Eyes:      General: No scleral icterus  Right eye: No discharge  Left eye: No discharge  Extraocular Movements: Extraocular movements intact  Conjunctiva/sclera: Conjunctivae normal       Pupils: Pupils are equal, round, and reactive to light  Cardiovascular:      Rate and Rhythm: Normal rate  Pulmonary:      Effort: Pulmonary effort is normal  No tachypnea, bradypnea, accessory muscle usage or respiratory distress  Abdominal:      General: There is no distension  Tenderness: There is no guarding  Musculoskeletal:      Cervical back: Normal range of motion  Right lower leg: No edema  Left lower leg: No edema  Skin:     General: Skin is dry  Coloration: Skin is not pale  Neurological:      Mental Status: She is alert and oriented to person, place, and time  Cranial Nerves: No dysarthria or facial asymmetry  Gait: Gait abnormal (using wheeled walker)  Psychiatric:         Attention and Perception: Attention normal          Mood and Affect: Mood and affect normal          Speech: Speech normal          Behavior: Behavior normal  Behavior is cooperative  Thought Content: Thought content normal          Cognition and Memory: Cognition and memory normal          Judgment: Judgment normal       Comments: Positive mood  Joseph Stanley MD  [facility]      Portions of this document may have been created using dictation software and as such some "sound alike" terms may have been generated by the system  Do not hesitate to contact me with any questions or clarifications

## 2022-05-05 NOTE — PROGRESS NOTES
Palliative Supportive Care  met with patient/family to continue to provide emotional support and guidance  Updated biopsychosocial information relevant to support: Met with pt in office  Pt discussed she is doing much better than at prior visit  Pt did not end up having HHA start as they "didn't show up " Pt feels she is doing well at home and continues to be well-supported by her family  Pt denies having any symptom concerns at this time  Pt also reports she is coping well emotionally  She admits to feeling lonely at times--but does enjoy time by herself and doing things as she likes to do them  Pt reports her family is also doing well  Pt reports her ambulation has improved and she is hopeful to eventually be cleared by her MD to be able to drive again  Pt enjoying activities such as crocheting and will begin a new project of "book art" which consists of pop-up art within books  She denies any further needs at this time  LSW encouraged pt to reach out should she need emotional support in the future  Pt appreciative of same        Identified areas of need include: None  Resources provided:None  Areas that need future follow-up include: None

## 2022-05-09 DIAGNOSIS — I48.91 ATRIAL FIBRILLATION, UNSPECIFIED TYPE (HCC): ICD-10-CM

## 2022-05-10 ENCOUNTER — TELEPHONE (OUTPATIENT)
Dept: LAB | Facility: HOSPITAL | Age: 72
End: 2022-05-10

## 2022-05-12 ENCOUNTER — APPOINTMENT (OUTPATIENT)
Dept: LAB | Facility: AMBULARY SURGERY CENTER | Age: 72
End: 2022-05-12
Payer: MEDICARE

## 2022-05-12 DIAGNOSIS — C34.92 ADENOCARCINOMA OF LEFT LUNG, STAGE 4 (HCC): ICD-10-CM

## 2022-05-12 DIAGNOSIS — C41.9 MALIGNANT NEOPLASM OF BONE WITH METASTASES (HCC): ICD-10-CM

## 2022-05-12 DIAGNOSIS — T45.1X5A CINV (CHEMOTHERAPY-INDUCED NAUSEA AND VOMITING): ICD-10-CM

## 2022-05-12 DIAGNOSIS — E83.52 HYPERCALCEMIA: ICD-10-CM

## 2022-05-12 DIAGNOSIS — R11.2 CINV (CHEMOTHERAPY-INDUCED NAUSEA AND VOMITING): ICD-10-CM

## 2022-05-12 DIAGNOSIS — T45.1X5A CHEMOTHERAPY INDUCED NEUTROPENIA (HCC): ICD-10-CM

## 2022-05-12 DIAGNOSIS — C34.12 MALIGNANT NEOPLASM OF UPPER LOBE OF LEFT LUNG (HCC): ICD-10-CM

## 2022-05-12 DIAGNOSIS — D70.1 CHEMOTHERAPY INDUCED NEUTROPENIA (HCC): ICD-10-CM

## 2022-05-12 LAB
ALBUMIN SERPL BCP-MCNC: 3.2 G/DL (ref 3.5–5)
ALP SERPL-CCNC: 84 U/L (ref 46–116)
ALT SERPL W P-5'-P-CCNC: 23 U/L (ref 12–78)
ANION GAP SERPL CALCULATED.3IONS-SCNC: 3 MMOL/L (ref 4–13)
AST SERPL W P-5'-P-CCNC: 21 U/L (ref 5–45)
BASOPHILS # BLD AUTO: 0.05 THOUSANDS/ΜL (ref 0–0.1)
BASOPHILS NFR BLD AUTO: 1 % (ref 0–1)
BILIRUB SERPL-MCNC: 0.53 MG/DL (ref 0.2–1)
BUN SERPL-MCNC: 25 MG/DL (ref 5–25)
CALCIUM ALBUM COR SERPL-MCNC: 10.1 MG/DL (ref 8.3–10.1)
CALCIUM SERPL-MCNC: 9.5 MG/DL (ref 8.3–10.1)
CHLORIDE SERPL-SCNC: 114 MMOL/L (ref 100–108)
CO2 SERPL-SCNC: 25 MMOL/L (ref 21–32)
CREAT SERPL-MCNC: 1.66 MG/DL (ref 0.6–1.3)
EOSINOPHIL # BLD AUTO: 0.1 THOUSAND/ΜL (ref 0–0.61)
EOSINOPHIL NFR BLD AUTO: 2 % (ref 0–6)
ERYTHROCYTE [DISTWIDTH] IN BLOOD BY AUTOMATED COUNT: 15.4 % (ref 11.6–15.1)
GFR SERPL CREATININE-BSD FRML MDRD: 30 ML/MIN/1.73SQ M
GLUCOSE SERPL-MCNC: 81 MG/DL (ref 65–140)
HCT VFR BLD AUTO: 34.6 % (ref 34.8–46.1)
HGB BLD-MCNC: 10.7 G/DL (ref 11.5–15.4)
IMM GRANULOCYTES # BLD AUTO: 0.02 THOUSAND/UL (ref 0–0.2)
IMM GRANULOCYTES NFR BLD AUTO: 1 % (ref 0–2)
LYMPHOCYTES # BLD AUTO: 0.74 THOUSANDS/ΜL (ref 0.6–4.47)
LYMPHOCYTES NFR BLD AUTO: 17 % (ref 14–44)
MCH RBC QN AUTO: 27.7 PG (ref 26.8–34.3)
MCHC RBC AUTO-ENTMCNC: 30.9 G/DL (ref 31.4–37.4)
MCV RBC AUTO: 90 FL (ref 82–98)
MONOCYTES # BLD AUTO: 0.32 THOUSAND/ΜL (ref 0.17–1.22)
MONOCYTES NFR BLD AUTO: 7 % (ref 4–12)
NEUTROPHILS # BLD AUTO: 3.21 THOUSANDS/ΜL (ref 1.85–7.62)
NEUTS SEG NFR BLD AUTO: 72 % (ref 43–75)
NRBC BLD AUTO-RTO: 0 /100 WBCS
PLATELET # BLD AUTO: 246 THOUSANDS/UL (ref 149–390)
PMV BLD AUTO: 11.3 FL (ref 8.9–12.7)
POTASSIUM SERPL-SCNC: 4.3 MMOL/L (ref 3.5–5.3)
PROT SERPL-MCNC: 6.2 G/DL (ref 6.4–8.2)
RBC # BLD AUTO: 3.86 MILLION/UL (ref 3.81–5.12)
SODIUM SERPL-SCNC: 142 MMOL/L (ref 136–145)
WBC # BLD AUTO: 4.44 THOUSAND/UL (ref 4.31–10.16)

## 2022-05-12 PROCEDURE — 36415 COLL VENOUS BLD VENIPUNCTURE: CPT

## 2022-05-12 PROCEDURE — 80053 COMPREHEN METABOLIC PANEL: CPT

## 2022-05-12 PROCEDURE — 85025 COMPLETE CBC W/AUTO DIFF WBC: CPT

## 2022-05-16 ENCOUNTER — HOSPITAL ENCOUNTER (OUTPATIENT)
Dept: INFUSION CENTER | Facility: CLINIC | Age: 72
Discharge: HOME | End: 2022-05-16
Payer: MEDICARE

## 2022-05-16 VITALS
HEART RATE: 60 BPM | TEMPERATURE: 97 F | OXYGEN SATURATION: 99 % | WEIGHT: 172.4 LBS | SYSTOLIC BLOOD PRESSURE: 121 MMHG | HEIGHT: 64 IN | RESPIRATION RATE: 18 BRPM | DIASTOLIC BLOOD PRESSURE: 79 MMHG | BODY MASS INDEX: 29.43 KG/M2

## 2022-05-16 DIAGNOSIS — T45.1X5A CHEMOTHERAPY INDUCED NEUTROPENIA (HCC): ICD-10-CM

## 2022-05-16 DIAGNOSIS — C41.9 MALIGNANT NEOPLASM OF BONE WITH METASTASES (HCC): ICD-10-CM

## 2022-05-16 DIAGNOSIS — C34.12 MALIGNANT NEOPLASM OF UPPER LOBE OF LEFT LUNG (HCC): ICD-10-CM

## 2022-05-16 DIAGNOSIS — R11.2 CINV (CHEMOTHERAPY-INDUCED NAUSEA AND VOMITING): ICD-10-CM

## 2022-05-16 DIAGNOSIS — E83.52 HYPERCALCEMIA: Primary | ICD-10-CM

## 2022-05-16 DIAGNOSIS — C34.92 ADENOCARCINOMA OF LEFT LUNG, STAGE 4 (HCC): ICD-10-CM

## 2022-05-16 DIAGNOSIS — T45.1X5A CINV (CHEMOTHERAPY-INDUCED NAUSEA AND VOMITING): ICD-10-CM

## 2022-05-16 DIAGNOSIS — D70.1 CHEMOTHERAPY INDUCED NEUTROPENIA (HCC): ICD-10-CM

## 2022-05-16 PROCEDURE — 96409 CHEMO IV PUSH SNGL DRUG: CPT

## 2022-05-16 PROCEDURE — 96367 TX/PROPH/DG ADDL SEQ IV INF: CPT

## 2022-05-16 RX ORDER — SODIUM CHLORIDE 9 MG/ML
20 INJECTION, SOLUTION INTRAVENOUS ONCE
Status: COMPLETED | OUTPATIENT
Start: 2022-05-16 | End: 2022-05-16

## 2022-05-16 RX ADMIN — SODIUM CHLORIDE 20 ML/HR: 9 INJECTION, SOLUTION INTRAVENOUS at 10:50

## 2022-05-16 RX ADMIN — VINORELBINE 47 MG: 10 INJECTION, SOLUTION INTRAVENOUS at 11:25

## 2022-05-16 RX ADMIN — ONDANSETRON 8 MG: 2 INJECTION INTRAMUSCULAR; INTRAVENOUS at 10:56

## 2022-05-16 NOTE — PATIENT INSTRUCTIONS
May 2022      [place] Monday Tuesday Wednesday Thursday Friday Saturday   1     2    INF ONCOLOGY TX-TREATMENT PLAN   1:30 PM   (120 min )   AN INF CHAIR [facility] 3     4     5    FOLLOW UP PG   2:05 PM   (40 min )   Quintin Collet, MD   [address] [facility]     PG   2:25 PM   (40 min )   Lorena Cunha MSW   [address] [facility] 6     7        Cycle 3, Day 15        8     9     10     11     12    LAB WALK IN   3:30 PM   (5 min )   AN [facility] LAB CHAIR 1   [facility] 13     14                15     16    INF ONCOLOGY TX-TREATMENT PLAN  10:30 AM   (120 min )   AN INF CHAIR [facility] 17     18     19     20     21        Cycle 4, Day 1        22     23     24     25     26     27     28                29     30     31    INF ONCOLOGY TX-TREATMENT PLAN  12:30 PM   (120 min )   AN INF CHAIR [facility]                        Cycle 4, Day 15               Treatment Details         5/2/2022 - Cycle 3, Day 15      Chemotherapy: ONCBCN PROVIDER COMMUNICATION9, VINORELBINE IVPB    5/16/2022 - Cycle 4, Day 1      Chemotherapy: ONCBCN PROVIDER COMMUNICATION9, VINORELBINE IVPB    5/30/2022 - Cycle 4, Day 15      Chemotherapy: ONCBCN PROVIDER COMMUNICATION9, VINORELBINE IVPB        June 2022 Sunday Monday Tuesday Wednesday Thursday Friday Saturday                  1     2     3     4                5     6     7     8     9     10     11                12     13    INF ONCOLOGY TX-TREATMENT PLAN  11:30 AM   (120 min )   AN INF CHAIR [facility] 14     15     16     17     18        Cycle 5, Day 1        19     20     21     22     23     24     25                26     27    INF ONCOLOGY TX-TREATMENT PLAN   9:30 AM   (120 min )   AN INF CHAIR [facility] 28    FOLLOW UP PG  11:15 AM   (30 min )   David Richard PA-C   [facility] 29     30                  Cycle 5, Day 15               Treatment Details         6/13/2022 - Cycle 5, Day 1      Chemotherapy: ONCBCN PROVIDER COMMUNICATION9, VINORELBINE IVPB    6/27/2022 - Cycle 5, Day 15      Chemotherapy: ONCBCN PROVIDER COMMUNICATION9, VINORELBINE IVPB

## 2022-05-16 NOTE — PROGRESS NOTES
Patient tolerated treatment without incident and was discharged post, no c/o offered, next dosing in 2 weeks, AVS declined

## 2022-05-19 DIAGNOSIS — M79.10 MYALGIA DUE TO STATIN: ICD-10-CM

## 2022-05-19 DIAGNOSIS — R60.0 BILATERAL LEG EDEMA: ICD-10-CM

## 2022-05-19 DIAGNOSIS — T46.6X5A MYALGIA DUE TO STATIN: ICD-10-CM

## 2022-05-19 RX ORDER — ATORVASTATIN CALCIUM 40 MG/1
TABLET, FILM COATED ORAL
Qty: 30 TABLET | Refills: 1 | Status: SHIPPED | OUTPATIENT
Start: 2022-05-19 | End: 2022-05-21

## 2022-05-19 RX ORDER — FUROSEMIDE 20 MG/1
20 TABLET ORAL DAILY PRN
Qty: 30 TABLET | Refills: 0 | Status: SHIPPED | OUTPATIENT
Start: 2022-05-19 | End: 2022-05-22

## 2022-05-21 DIAGNOSIS — M79.10 MYALGIA DUE TO STATIN: ICD-10-CM

## 2022-05-21 DIAGNOSIS — T46.6X5A MYALGIA DUE TO STATIN: ICD-10-CM

## 2022-05-21 DIAGNOSIS — R60.0 BILATERAL LEG EDEMA: ICD-10-CM

## 2022-05-21 RX ORDER — ATORVASTATIN CALCIUM 40 MG/1
TABLET, FILM COATED ORAL
Qty: 90 TABLET | Refills: 1 | Status: SHIPPED | OUTPATIENT
Start: 2022-05-21

## 2022-05-22 RX ORDER — FUROSEMIDE 20 MG/1
20 TABLET ORAL DAILY PRN
Qty: 90 TABLET | Refills: 1 | Status: SHIPPED | OUTPATIENT
Start: 2022-05-22

## 2022-05-27 ENCOUNTER — APPOINTMENT (OUTPATIENT)
Dept: LAB | Facility: HOSPITAL | Age: 72
End: 2022-05-27
Payer: MEDICARE

## 2022-05-27 DIAGNOSIS — T45.1X5A CHEMOTHERAPY INDUCED NEUTROPENIA (HCC): ICD-10-CM

## 2022-05-27 DIAGNOSIS — R11.2 CINV (CHEMOTHERAPY-INDUCED NAUSEA AND VOMITING): ICD-10-CM

## 2022-05-27 DIAGNOSIS — E83.52 HYPERCALCEMIA: ICD-10-CM

## 2022-05-27 DIAGNOSIS — C34.92 ADENOCARCINOMA OF LEFT LUNG, STAGE 4 (HCC): ICD-10-CM

## 2022-05-27 DIAGNOSIS — C41.9 MALIGNANT NEOPLASM OF BONE WITH METASTASES (HCC): ICD-10-CM

## 2022-05-27 DIAGNOSIS — C34.12 MALIGNANT NEOPLASM OF UPPER LOBE OF LEFT LUNG (HCC): ICD-10-CM

## 2022-05-27 DIAGNOSIS — D70.1 CHEMOTHERAPY INDUCED NEUTROPENIA (HCC): ICD-10-CM

## 2022-05-27 DIAGNOSIS — T45.1X5A CINV (CHEMOTHERAPY-INDUCED NAUSEA AND VOMITING): ICD-10-CM

## 2022-05-27 LAB
BASOPHILS # BLD AUTO: 0.05 THOUSANDS/ΜL (ref 0–0.1)
BASOPHILS NFR BLD AUTO: 1 % (ref 0–1)
EOSINOPHIL # BLD AUTO: 0.16 THOUSAND/ΜL (ref 0–0.61)
EOSINOPHIL NFR BLD AUTO: 3 % (ref 0–6)
ERYTHROCYTE [DISTWIDTH] IN BLOOD BY AUTOMATED COUNT: 15.8 % (ref 11.6–15.1)
HCT VFR BLD AUTO: 34.6 % (ref 34.8–46.1)
HGB BLD-MCNC: 10.6 G/DL (ref 11.5–15.4)
IMM GRANULOCYTES # BLD AUTO: 0.02 THOUSAND/UL (ref 0–0.2)
IMM GRANULOCYTES NFR BLD AUTO: 0 % (ref 0–2)
LYMPHOCYTES # BLD AUTO: 0.77 THOUSANDS/ΜL (ref 0.6–4.47)
LYMPHOCYTES NFR BLD AUTO: 16 % (ref 14–44)
MCH RBC QN AUTO: 27.7 PG (ref 26.8–34.3)
MCHC RBC AUTO-ENTMCNC: 30.6 G/DL (ref 31.4–37.4)
MCV RBC AUTO: 91 FL (ref 82–98)
MONOCYTES # BLD AUTO: 0.35 THOUSAND/ΜL (ref 0.17–1.22)
MONOCYTES NFR BLD AUTO: 7 % (ref 4–12)
NEUTROPHILS # BLD AUTO: 3.47 THOUSANDS/ΜL (ref 1.85–7.62)
NEUTS SEG NFR BLD AUTO: 73 % (ref 43–75)
NRBC BLD AUTO-RTO: 0 /100 WBCS
PLATELET # BLD AUTO: 264 THOUSANDS/UL (ref 149–390)
PMV BLD AUTO: 11.3 FL (ref 8.9–12.7)
RBC # BLD AUTO: 3.82 MILLION/UL (ref 3.81–5.12)
WBC # BLD AUTO: 4.82 THOUSAND/UL (ref 4.31–10.16)

## 2022-05-27 PROCEDURE — 85025 COMPLETE CBC W/AUTO DIFF WBC: CPT

## 2022-05-27 PROCEDURE — 36415 COLL VENOUS BLD VENIPUNCTURE: CPT

## 2022-05-31 ENCOUNTER — HOSPITAL ENCOUNTER (OUTPATIENT)
Dept: INFUSION CENTER | Facility: CLINIC | Age: 72
Discharge: HOME | End: 2022-05-31
Payer: MEDICARE

## 2022-05-31 VITALS
DIASTOLIC BLOOD PRESSURE: 62 MMHG | SYSTOLIC BLOOD PRESSURE: 94 MMHG | RESPIRATION RATE: 18 BRPM | HEIGHT: 64 IN | WEIGHT: 175.93 LBS | OXYGEN SATURATION: 99 % | HEART RATE: 58 BPM | BODY MASS INDEX: 30.04 KG/M2 | TEMPERATURE: 97.3 F

## 2022-05-31 DIAGNOSIS — C34.12 MALIGNANT NEOPLASM OF UPPER LOBE OF LEFT LUNG (HCC): ICD-10-CM

## 2022-05-31 DIAGNOSIS — T45.1X5A CINV (CHEMOTHERAPY-INDUCED NAUSEA AND VOMITING): ICD-10-CM

## 2022-05-31 DIAGNOSIS — E83.52 HYPERCALCEMIA: Primary | ICD-10-CM

## 2022-05-31 DIAGNOSIS — R11.2 CINV (CHEMOTHERAPY-INDUCED NAUSEA AND VOMITING): ICD-10-CM

## 2022-05-31 DIAGNOSIS — T45.1X5A CHEMOTHERAPY INDUCED NEUTROPENIA (HCC): ICD-10-CM

## 2022-05-31 DIAGNOSIS — D70.1 CHEMOTHERAPY INDUCED NEUTROPENIA (HCC): ICD-10-CM

## 2022-05-31 DIAGNOSIS — C41.9 MALIGNANT NEOPLASM OF BONE WITH METASTASES (HCC): ICD-10-CM

## 2022-05-31 DIAGNOSIS — C34.92 ADENOCARCINOMA OF LEFT LUNG, STAGE 4 (HCC): ICD-10-CM

## 2022-05-31 PROCEDURE — 96367 TX/PROPH/DG ADDL SEQ IV INF: CPT

## 2022-05-31 PROCEDURE — 96409 CHEMO IV PUSH SNGL DRUG: CPT

## 2022-05-31 RX ORDER — SODIUM CHLORIDE 9 MG/ML
20 INJECTION, SOLUTION INTRAVENOUS ONCE
Status: COMPLETED | OUTPATIENT
Start: 2022-05-31 | End: 2022-05-31

## 2022-05-31 RX ADMIN — ONDANSETRON 8 MG: 2 INJECTION INTRAMUSCULAR; INTRAVENOUS at 12:53

## 2022-05-31 RX ADMIN — VINORELBINE 47 MG: 10 INJECTION, SOLUTION INTRAVENOUS at 13:22

## 2022-05-31 RX ADMIN — SODIUM CHLORIDE 20 ML/HR: 0.9 INJECTION, SOLUTION INTRAVENOUS at 12:51

## 2022-06-02 ENCOUNTER — TELEPHONE (OUTPATIENT)
Dept: INFUSION CENTER | Facility: CLINIC | Age: 72
End: 2022-06-02

## 2022-06-02 NOTE — TELEPHONE ENCOUNTER
Patient calling into infusion regarding her appt on 6/13  Patient stated she will be away on vacation that week and will not return until 6/18  Advised to pt I would contact ordering providers office to see if it is ok to defer until 6/20 and I will call her back once I have a response  She verbalized understanding

## 2022-06-03 ENCOUNTER — TELEPHONE (OUTPATIENT)
Dept: INFUSION CENTER | Facility: CLINIC | Age: 72
End: 2022-06-03

## 2022-06-03 NOTE — TELEPHONE ENCOUNTER
I received a call from patient stating she needs to move her appointment from 7/19 to 7/25 due to her being out of town the week of 7/19   Appointment rescheduled to 7/25

## 2022-06-06 DIAGNOSIS — E11.9 CONTROLLED TYPE 2 DIABETES MELLITUS WITHOUT COMPLICATION, WITHOUT LONG-TERM CURRENT USE OF INSULIN (HCC): ICD-10-CM

## 2022-06-13 RX ORDER — SODIUM CHLORIDE 9 MG/ML
20 INJECTION, SOLUTION INTRAVENOUS ONCE
Status: CANCELLED | OUTPATIENT
Start: 2022-07-05

## 2022-06-13 RX ORDER — SODIUM CHLORIDE 9 MG/ML
20 INJECTION, SOLUTION INTRAVENOUS ONCE
Status: CANCELLED | OUTPATIENT
Start: 2022-06-20

## 2022-06-15 DIAGNOSIS — F41.9 ANXIETY: ICD-10-CM

## 2022-06-15 DIAGNOSIS — C34.90 ADENOCARCINOMA OF LUNG, STAGE 4, UNSPECIFIED LATERALITY (HCC): ICD-10-CM

## 2022-06-15 DIAGNOSIS — F32.5 MAJOR DEPRESSIVE DISORDER WITH SINGLE EPISODE, IN FULL REMISSION (HCC): ICD-10-CM

## 2022-06-15 DIAGNOSIS — C79.52 SECONDARY MALIGNANT NEOPLASM OF BONE AND BONE MARROW (HCC): ICD-10-CM

## 2022-06-15 DIAGNOSIS — Z51.5 PALLIATIVE CARE PATIENT: ICD-10-CM

## 2022-06-15 DIAGNOSIS — C79.51 SECONDARY MALIGNANT NEOPLASM OF BONE AND BONE MARROW (HCC): ICD-10-CM

## 2022-06-15 RX ORDER — BUSPIRONE HYDROCHLORIDE 7.5 MG/1
TABLET ORAL
Qty: 180 TABLET | Refills: 0 | Status: SHIPPED | OUTPATIENT
Start: 2022-06-15

## 2022-06-18 ENCOUNTER — TELEPHONE (OUTPATIENT)
Dept: INFUSION CENTER | Facility: CLINIC | Age: 72
End: 2022-06-18

## 2022-06-20 ENCOUNTER — APPOINTMENT (OUTPATIENT)
Dept: LAB | Facility: AMBULARY SURGERY CENTER | Age: 72
End: 2022-06-20
Payer: MEDICARE

## 2022-06-20 ENCOUNTER — HOSPITAL ENCOUNTER (OUTPATIENT)
Dept: INFUSION CENTER | Facility: CLINIC | Age: 72
Discharge: HOME | End: 2022-06-20
Payer: MEDICARE

## 2022-06-20 VITALS
HEIGHT: 64 IN | DIASTOLIC BLOOD PRESSURE: 71 MMHG | OXYGEN SATURATION: 99 % | SYSTOLIC BLOOD PRESSURE: 123 MMHG | RESPIRATION RATE: 18 BRPM | TEMPERATURE: 97 F | BODY MASS INDEX: 30.56 KG/M2 | HEART RATE: 62 BPM | WEIGHT: 179 LBS

## 2022-06-20 DIAGNOSIS — D70.1 CHEMOTHERAPY INDUCED NEUTROPENIA (HCC): ICD-10-CM

## 2022-06-20 DIAGNOSIS — C34.12 MALIGNANT NEOPLASM OF UPPER LOBE OF LEFT LUNG (HCC): ICD-10-CM

## 2022-06-20 DIAGNOSIS — C41.9 MALIGNANT NEOPLASM OF BONE WITH METASTASES (HCC): ICD-10-CM

## 2022-06-20 DIAGNOSIS — R11.2 CINV (CHEMOTHERAPY-INDUCED NAUSEA AND VOMITING): ICD-10-CM

## 2022-06-20 DIAGNOSIS — C34.92 ADENOCARCINOMA OF LEFT LUNG, STAGE 4 (HCC): ICD-10-CM

## 2022-06-20 DIAGNOSIS — E83.52 HYPERCALCEMIA: Primary | ICD-10-CM

## 2022-06-20 DIAGNOSIS — T45.1X5A CHEMOTHERAPY INDUCED NEUTROPENIA (HCC): ICD-10-CM

## 2022-06-20 DIAGNOSIS — T45.1X5A CINV (CHEMOTHERAPY-INDUCED NAUSEA AND VOMITING): ICD-10-CM

## 2022-06-20 DIAGNOSIS — E83.52 HYPERCALCEMIA: ICD-10-CM

## 2022-06-20 LAB
ALBUMIN SERPL BCP-MCNC: 2.7 G/DL (ref 3.5–5)
ALP SERPL-CCNC: 86 U/L (ref 46–116)
ALT SERPL W P-5'-P-CCNC: 21 U/L (ref 12–78)
ANION GAP SERPL CALCULATED.3IONS-SCNC: 6 MMOL/L (ref 4–13)
AST SERPL W P-5'-P-CCNC: 19 U/L (ref 5–45)
BASOPHILS # BLD AUTO: 0.04 THOUSANDS/ΜL (ref 0–0.1)
BASOPHILS NFR BLD AUTO: 1 % (ref 0–1)
BILIRUB SERPL-MCNC: 0.54 MG/DL (ref 0.2–1)
BUN SERPL-MCNC: 30 MG/DL (ref 5–25)
CALCIUM ALBUM COR SERPL-MCNC: 10.3 MG/DL (ref 8.3–10.1)
CALCIUM SERPL-MCNC: 9.3 MG/DL (ref 8.3–10.1)
CHLORIDE SERPL-SCNC: 110 MMOL/L (ref 100–108)
CO2 SERPL-SCNC: 24 MMOL/L (ref 21–32)
CREAT SERPL-MCNC: 1.66 MG/DL (ref 0.6–1.3)
EOSINOPHIL # BLD AUTO: 0.1 THOUSAND/ΜL (ref 0–0.61)
EOSINOPHIL NFR BLD AUTO: 2 % (ref 0–6)
ERYTHROCYTE [DISTWIDTH] IN BLOOD BY AUTOMATED COUNT: 16.9 % (ref 11.6–15.1)
GFR SERPL CREATININE-BSD FRML MDRD: 30 ML/MIN/1.73SQ M
GLUCOSE P FAST SERPL-MCNC: 217 MG/DL (ref 65–99)
HCT VFR BLD AUTO: 31.6 % (ref 34.8–46.1)
HGB BLD-MCNC: 9.9 G/DL (ref 11.5–15.4)
IMM GRANULOCYTES # BLD AUTO: 0.06 THOUSAND/UL (ref 0–0.2)
IMM GRANULOCYTES NFR BLD AUTO: 1 % (ref 0–2)
LYMPHOCYTES # BLD AUTO: 0.58 THOUSANDS/ΜL (ref 0.6–4.47)
LYMPHOCYTES NFR BLD AUTO: 10 % (ref 14–44)
MCH RBC QN AUTO: 29.1 PG (ref 26.8–34.3)
MCHC RBC AUTO-ENTMCNC: 31.3 G/DL (ref 31.4–37.4)
MCV RBC AUTO: 93 FL (ref 82–98)
MONOCYTES # BLD AUTO: 0.56 THOUSAND/ΜL (ref 0.17–1.22)
MONOCYTES NFR BLD AUTO: 9 % (ref 4–12)
NEUTROPHILS # BLD AUTO: 4.61 THOUSANDS/ΜL (ref 1.85–7.62)
NEUTS SEG NFR BLD AUTO: 77 % (ref 43–75)
NRBC BLD AUTO-RTO: 0 /100 WBCS
PLATELET # BLD AUTO: 165 THOUSANDS/UL (ref 149–390)
PMV BLD AUTO: 12.3 FL (ref 8.9–12.7)
POTASSIUM SERPL-SCNC: 4 MMOL/L (ref 3.5–5.3)
PROT SERPL-MCNC: 6 G/DL (ref 6.4–8.2)
RBC # BLD AUTO: 3.4 MILLION/UL (ref 3.81–5.12)
SODIUM SERPL-SCNC: 140 MMOL/L (ref 136–145)
WBC # BLD AUTO: 5.95 THOUSAND/UL (ref 4.31–10.16)

## 2022-06-20 PROCEDURE — 36415 COLL VENOUS BLD VENIPUNCTURE: CPT

## 2022-06-20 PROCEDURE — 85025 COMPLETE CBC W/AUTO DIFF WBC: CPT

## 2022-06-20 PROCEDURE — 80053 COMPREHEN METABOLIC PANEL: CPT

## 2022-06-20 PROCEDURE — 96367 TX/PROPH/DG ADDL SEQ IV INF: CPT

## 2022-06-20 PROCEDURE — 96409 CHEMO IV PUSH SNGL DRUG: CPT

## 2022-06-20 RX ORDER — SODIUM CHLORIDE 9 MG/ML
20 INJECTION, SOLUTION INTRAVENOUS ONCE
Status: COMPLETED | OUTPATIENT
Start: 2022-06-20 | End: 2022-06-20

## 2022-06-20 RX ADMIN — VINORELBINE 47 MG: 10 INJECTION, SOLUTION INTRAVENOUS at 13:25

## 2022-06-20 RX ADMIN — SODIUM CHLORIDE 20 ML/HR: 0.9 INJECTION, SOLUTION INTRAVENOUS at 12:35

## 2022-06-20 RX ADMIN — ONDANSETRON 8 MG: 2 INJECTION INTRAMUSCULAR; INTRAVENOUS at 12:37

## 2022-06-20 NOTE — PROGRESS NOTES
Patient is here for [facility]  Labs reviewed from 5/27 (CBC) and 5/12 (CMP), OK to treat with these labs per Guille Decker RN, within treatment parameters Port was accessed with good blood return noted  Patient resting with no complains  Will continue to monitor

## 2022-06-20 NOTE — PROGRESS NOTES
Patient tolerated infusion without complications  Port flushed and good blood return noted  Patient aware of next appointment  Patient provided appointment list  [facility] notified for pt

## 2022-06-27 ENCOUNTER — TELEPHONE (OUTPATIENT)
Dept: LAB | Facility: HOSPITAL | Age: 72
End: 2022-06-27

## 2022-06-28 ENCOUNTER — TELEPHONE (OUTPATIENT)
Dept: HEMATOLOGY ONCOLOGY | Facility: CLINIC | Age: 72
End: 2022-06-28

## 2022-06-28 ENCOUNTER — OFFICE VISIT (OUTPATIENT)
Dept: HEMATOLOGY ONCOLOGY | Facility: CLINIC | Age: 72
End: 2022-06-28
Payer: MEDICARE

## 2022-06-28 VITALS
WEIGHT: 177 LBS | HEART RATE: 69 BPM | RESPIRATION RATE: 16 BRPM | HEIGHT: 64 IN | TEMPERATURE: 98 F | DIASTOLIC BLOOD PRESSURE: 72 MMHG | OXYGEN SATURATION: 96 % | SYSTOLIC BLOOD PRESSURE: 120 MMHG | BODY MASS INDEX: 30.22 KG/M2

## 2022-06-28 DIAGNOSIS — T45.1X5A CHEMOTHERAPY INDUCED NEUTROPENIA (HCC): ICD-10-CM

## 2022-06-28 DIAGNOSIS — C34.12 MALIGNANT NEOPLASM OF UPPER LOBE OF LEFT LUNG (HCC): Primary | ICD-10-CM

## 2022-06-28 DIAGNOSIS — T45.1X5A CINV (CHEMOTHERAPY-INDUCED NAUSEA AND VOMITING): Chronic | ICD-10-CM

## 2022-06-28 DIAGNOSIS — N18.32 ANEMIA IN STAGE 3B CHRONIC KIDNEY DISEASE (HCC): ICD-10-CM

## 2022-06-28 DIAGNOSIS — C34.92 ADENOCARCINOMA OF LEFT LUNG, STAGE 4 (HCC): ICD-10-CM

## 2022-06-28 DIAGNOSIS — D64.81 ANEMIA DUE TO ANTINEOPLASTIC CHEMOTHERAPY: ICD-10-CM

## 2022-06-28 DIAGNOSIS — T45.1X5A ANEMIA DUE TO ANTINEOPLASTIC CHEMOTHERAPY: ICD-10-CM

## 2022-06-28 DIAGNOSIS — C34.90 ADENOCARCINOMA OF LUNG, STAGE 4, UNSPECIFIED LATERALITY (HCC): ICD-10-CM

## 2022-06-28 DIAGNOSIS — C41.9 MALIGNANT NEOPLASM OF BONE WITH METASTASES (HCC): ICD-10-CM

## 2022-06-28 DIAGNOSIS — R11.2 CINV (CHEMOTHERAPY-INDUCED NAUSEA AND VOMITING): Chronic | ICD-10-CM

## 2022-06-28 DIAGNOSIS — E83.52 HYPERCALCEMIA: ICD-10-CM

## 2022-06-28 DIAGNOSIS — D63.1 ANEMIA IN STAGE 3B CHRONIC KIDNEY DISEASE (HCC): ICD-10-CM

## 2022-06-28 DIAGNOSIS — D70.1 CHEMOTHERAPY INDUCED NEUTROPENIA (HCC): ICD-10-CM

## 2022-06-28 PROCEDURE — 99215 OFFICE O/P EST HI 40 MIN: CPT | Performed by: PHYSICIAN ASSISTANT

## 2022-06-28 NOTE — TELEPHONE ENCOUNTER
Please schedule out treatment  Patient prefers Monday morning appts  She is a [facility] patient  Thank you!

## 2022-06-28 NOTE — PROGRESS NOTES
Hematology/Oncology Outpatient Follow- up Note  Enriqueclarisa Griffith 66 y o  female MRN: @ Encounter: [id number]        Date:  6/28/2022      Assessment / Plan:    Stage 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 of the lung with left scapula involvement diagnosed on 08/2016 PDL expression more than 50%, negative for EGFR mutation, ALK  rearrangement, Ros1 mutation     Treated initially with Pembrolizumab complicated with pneumonitis and later on nivolumab with prednisone 10 mg p o  Daily with excellent response  2   Disease progression in August 2018 in the left scapula with pain no new lesions by PET scan  Status post radiation therapy to the left scapula and treated with Alimta 500 milligram/meter squared, carboplatin AUC 5  After 3 cycles,  CT scan in January 2019 showed stable disease, and she was placed on maintenance Alimta 500 milligram/meter squared every 3 weeks  Alimta dose was reduced to 400 milligram/meter IV due to elevated Creatinine and  then Pemetrexed dose was reduced to 300 milligram/meter squared every 4 weeks  Cycle #38  Alimta was 6/22/21  3   Progression of disease 7/2021  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%  (MAP2K1 mutations are mutually exclusive with BRAF mutations)  There are FDA approved therapies indicated for other disease states such as binimetinib, cobimetinib, selumetinib, trametinib  Given the very small (0 1%) DNA amplification, use of these medications off label are likely to offer minimal benefit  Nivolumab 240 mg flat dose every 3 weeks and  carboplatin AUC 5 added to  pemetrexed which was dose reduced to 250 milligram/meter squared, initiated 8/2/2021     4  Progression of disease  Treatment changed to Taxotere 75mg/m2 10/2021    Cyramza added with cycle 2 11/2021     5   Right basal ganglia and right ischemic CVA on 01/2022 with hospitalization and rehab which may have been secondary to Traceystad 6   Therapy changed to Navelbine 30 mg per m2 every other week since 02/02/2022  Stable disease by CT scan of the chest on 04/2022  Navelbine continued  F/U CT in 2 months requested  Chemo induced anemia  Hemoglobin 9 9 6/20/22 compared to 12 3 4/2022;  10 6 5/2022  CKD 1  4 - 1 6    Atrial fibrillation/ history of CVA on Eliquis        Labs and imaging studies are reviewed by ordering provider once results are available  If there are findings that need immediate attention, you will be contacted when results available  Discussing results and the implication on your healthcare is best discussed in person at your follow-up visit  HPI:   Star Lopez was admitted to the hospital with arrhythmia and was found to have right lower lobe infiltrate in August 2016  She was treated with antibiotics however repeat chest x-ray showed persistent right lower lobe infiltrate  Subsequently the patient had a CT scan of the chest which showed a right perihilar mass, subcarinal lymphadenopathy, lytic lesion of the right costovertebral junction at T10 level  PET scan October 2016 showed a right perihilar mass measuring 3 5 cm with SUV of 8 9, subcarinal lymph nodes measuring 3 4 x 2 2 cm with SUV of 9 2  Nodule in the left upper lobe lung measured 2 x 1 1 cm  There was a lytic lesion involving the right 10th costovertebral junction with SUV of 14 4  Biopsy showed non-small cell carcinoma with features suggesting of adenocarcinoma positive for CK 7, CK 19, CA-19-9, ANEUDY-3, partially positive for P40, p63, negative for TTF-1  She had a history of uterine cancer in 2000 status post hysterectomy and did not require radiation or chemotherapy  She is status post bilateral oophorectomy, right knee replacement,   tonsillectomy  She used to smoke for 35 years 1 pack per day however quit smoking 21 years ago  She used hormonal replacement therapy for 3 years    She has a family history significant for skin cancer in her father and coronary artery disease in mother  She has 2 healthy children  Treated initially with Pembrolizumab December 2016, Finished in May 2017 secondary to grade 3 pneumonitis  Initiated on prednisone  Progression: Nivolumab 240 mg flat dose every 2 weeks along with prednisone 10 mg p o  Daily initiated April 2018- October 2018 with excellent response  Liquid biopsy showed K-MADHURI mutation G12C, no evidence of MSI high        Disease progression in August 2018 in the left scapula with pain no new lesions by PET scan  Status post radiation therapy to the left scapula and treated with Alimta 500 milligram/meter squared, carboplatin AUC 5  After 3 cycles,  CT scan in January 2019 showed stable disease  Carbo discontinued 3/2019  Maintenance Alimta 500 milligram/meter squared every 3 weeks initiated 3/2019  Cr 2/20 was 1 5  Plan was to dose reduce Alimta to 400mg/m2; however cr 2 16 2/24/20 and Alimta was held  3/4/20:  renal u/s  Minimal fullness of the left renal collecting system without matthieu hydronephrosis  Chronic right kidney lower pole cortical scar, with adjacent parenchymal calcification measuring 5 mm      She was on prednisone 5 mg p o  daily because of previous history of pneumonitis  CT scan chest 1/3/2020 showed no evidence of infiltration in the lung parenchyma, prednisone was reduced every other day for 1 month and then discontinued  Progression of disease 7/2021  Nivolumab 240 mg flat dose every 3 weeks and  carboplatin AUC 5 added to pemetrexed which was dose reduced to 250 milligram/meter squared, initiated 8/2/2021    Progression of disease  Treatment changed to Taxotere 75mg/m2 10/2021  Cyramza added with cycle 2 11/2021       Right basal ganglia and right ischemic CVA on 01/2022 with hospitalization and rehab which may have been secondary to [address] [place] [address] changed to Navelbine 30 mg per m2 every other week since 02/02/2022  Interval History:  She is feeling well  Occasionally off balance  Test Results:        Labs:   Lab Results   Component Value Date    HGB 9 9 (L) 06/20/2022    HCT 31 6 (L) 06/20/2022    MCV 93 06/20/2022     06/20/2022    WBC 5 95 06/20/2022    NRBC 0 06/20/2022     Lab Results   Component Value Date     11/23/2015    K 4 0 06/20/2022     (H) 06/20/2022    CO2 24 06/20/2022    ANIONGAP 9 11/23/2015    BUN 30 (H) 06/20/2022    CREATININE 1 66 (H) 06/20/2022    GLUCOSE 98 01/03/2022    GLUF 217 (H) 06/20/2022    CALCIUM 9 3 06/20/2022    CORRECTEDCA 10 3 (H) 06/20/2022    AST 19 06/20/2022    ALT 21 06/20/2022    ALKPHOS 86 06/20/2022    PROT 6 8 11/23/2015    BILITOT 0 65 11/23/2015    EGFR 30 06/20/2022       Imaging: No results found  ROS:  As mentioned in HPI & Interval History otherwise 14 point ROS negative  Allergies: Allergies   Allergen Reactions    Amoxicillin Hives    Amoxicillin Rash and Hives    Cardizem [Diltiazem] Rash     Rash      Statins Myalgia     Severe muscle aching  Terrible pains    Zofran [Ondansetron] Palpitations     Current Medications: Reviewed  PMH/FH/SH:  Reviewed      Physical Exam:    1 86 meters squared    Ht Readings from Last 3 Encounters:   06/28/22 5' 4 02" (1 626 m)   06/20/22 5' 4 02" (1 626 m)   05/31/22 5' 4 02" (1 626 m)        Wt Readings from Last 3 Encounters:   06/28/22 80 3 kg (177 lb)   06/20/22 81 2 kg (179 lb)   05/31/22 79 8 kg (175 lb 14 8 oz)        Temp Readings from Last 3 Encounters:   06/20/22 (!) 97 °F (36 1 °C) (Temporal)   05/31/22 (!) 97 3 °F (36 3 °C) (Temporal)   05/16/22 (!) 97 °F (36 1 °C) (Temporal)        BP Readings from Last 3 Encounters:   06/20/22 123/71   05/31/22 94/62   05/16/22 121/79           Physical Exam  Vitals reviewed  Constitutional:       General: She is not in acute distress  Appearance: She is well-developed  She is not diaphoretic     HENT:      Head: Normocephalic and atraumatic  Eyes:      Conjunctiva/sclera: Conjunctivae normal    Neck:      Trachea: No tracheal deviation  Cardiovascular:      Rate and Rhythm: Normal rate and regular rhythm  Heart sounds: No murmur heard  No friction rub  No gallop  Pulmonary:      Effort: Pulmonary effort is normal  No respiratory distress  Breath sounds: Normal breath sounds  No wheezing or rales  Chest:      Chest wall: No tenderness  Abdominal:      General: There is no distension  Palpations: Abdomen is soft  Tenderness: There is no abdominal tenderness  Musculoskeletal:      Cervical back: Normal range of motion and neck supple  Comments: walker   Lymphadenopathy:      Cervical: No cervical adenopathy  Skin:     General: Skin is warm and dry  Coloration: Skin is not pale  Findings: No erythema  Neurological:      Mental Status: She is alert and oriented to person, place, and time  Psychiatric:         Behavior: Behavior normal          Thought Content:  Thought content normal          Judgment: Judgment normal          ECO      Emergency Contacts:    Extended Emergency Contact Information  Primary Emergency Contact: Patel Reinoso  Address: [address] [address] [facility] [employer] Phone: [phone]  Relation: Brother  Secondary Emergency Contact: Liz Booth  Address: [address] [place] [address] [employer] Phone: [phone]  Relation: Daughter

## 2022-06-28 NOTE — TELEPHONE ENCOUNTER
Spoke to patient to make her aware of her updated infusion schedule  Patient said she  will check her MyChart for more details

## 2022-07-01 ENCOUNTER — APPOINTMENT (OUTPATIENT)
Dept: LAB | Facility: HOSPITAL | Age: 72
End: 2022-07-01
Payer: MEDICARE

## 2022-07-01 DIAGNOSIS — R11.2 CINV (CHEMOTHERAPY-INDUCED NAUSEA AND VOMITING): ICD-10-CM

## 2022-07-01 DIAGNOSIS — C34.92 ADENOCARCINOMA OF LEFT LUNG, STAGE 4 (HCC): ICD-10-CM

## 2022-07-01 DIAGNOSIS — E83.52 HYPERCALCEMIA: ICD-10-CM

## 2022-07-01 DIAGNOSIS — T45.1X5A CINV (CHEMOTHERAPY-INDUCED NAUSEA AND VOMITING): ICD-10-CM

## 2022-07-01 DIAGNOSIS — C34.12 MALIGNANT NEOPLASM OF UPPER LOBE OF LEFT LUNG (HCC): ICD-10-CM

## 2022-07-01 DIAGNOSIS — D70.1 CHEMOTHERAPY INDUCED NEUTROPENIA (HCC): ICD-10-CM

## 2022-07-01 DIAGNOSIS — C41.9 MALIGNANT NEOPLASM OF BONE WITH METASTASES (HCC): ICD-10-CM

## 2022-07-01 DIAGNOSIS — T45.1X5A CHEMOTHERAPY INDUCED NEUTROPENIA (HCC): ICD-10-CM

## 2022-07-01 LAB
ALBUMIN SERPL BCP-MCNC: 3.1 G/DL (ref 3.5–5)
ALP SERPL-CCNC: 83 U/L (ref 46–116)
ALT SERPL W P-5'-P-CCNC: 19 U/L (ref 12–78)
ANION GAP SERPL CALCULATED.3IONS-SCNC: 7 MMOL/L (ref 4–13)
AST SERPL W P-5'-P-CCNC: 22 U/L (ref 5–45)
BASOPHILS # BLD AUTO: 0.02 THOUSANDS/ΜL (ref 0–0.1)
BASOPHILS NFR BLD AUTO: 1 % (ref 0–1)
BILIRUB SERPL-MCNC: 0.44 MG/DL (ref 0.2–1)
BUN SERPL-MCNC: 37 MG/DL (ref 5–25)
CALCIUM ALBUM COR SERPL-MCNC: 10 MG/DL (ref 8.3–10.1)
CALCIUM SERPL-MCNC: 9.3 MG/DL (ref 8.3–10.1)
CHLORIDE SERPL-SCNC: 107 MMOL/L (ref 100–108)
CO2 SERPL-SCNC: 25 MMOL/L (ref 21–32)
CREAT SERPL-MCNC: 1.74 MG/DL (ref 0.6–1.3)
EOSINOPHIL # BLD AUTO: 0.16 THOUSAND/ΜL (ref 0–0.61)
EOSINOPHIL NFR BLD AUTO: 5 % (ref 0–6)
ERYTHROCYTE [DISTWIDTH] IN BLOOD BY AUTOMATED COUNT: 15.9 % (ref 11.6–15.1)
GFR SERPL CREATININE-BSD FRML MDRD: 28 ML/MIN/1.73SQ M
GLUCOSE SERPL-MCNC: 195 MG/DL (ref 65–140)
HCT VFR BLD AUTO: 31.7 % (ref 34.8–46.1)
HGB BLD-MCNC: 9.8 G/DL (ref 11.5–15.4)
IMM GRANULOCYTES # BLD AUTO: 0.01 THOUSAND/UL (ref 0–0.2)
IMM GRANULOCYTES NFR BLD AUTO: 0 % (ref 0–2)
LYMPHOCYTES # BLD AUTO: 0.76 THOUSANDS/ΜL (ref 0.6–4.47)
LYMPHOCYTES NFR BLD AUTO: 22 % (ref 14–44)
MCH RBC QN AUTO: 28.8 PG (ref 26.8–34.3)
MCHC RBC AUTO-ENTMCNC: 30.9 G/DL (ref 31.4–37.4)
MCV RBC AUTO: 93 FL (ref 82–98)
MONOCYTES # BLD AUTO: 0.27 THOUSAND/ΜL (ref 0.17–1.22)
MONOCYTES NFR BLD AUTO: 8 % (ref 4–12)
NEUTROPHILS # BLD AUTO: 2.27 THOUSANDS/ΜL (ref 1.85–7.62)
NEUTS SEG NFR BLD AUTO: 64 % (ref 43–75)
NRBC BLD AUTO-RTO: 0 /100 WBCS
PLATELET # BLD AUTO: 246 THOUSANDS/UL (ref 149–390)
PMV BLD AUTO: 11.1 FL (ref 8.9–12.7)
POTASSIUM SERPL-SCNC: 4.4 MMOL/L (ref 3.5–5.3)
PROT SERPL-MCNC: 6.2 G/DL (ref 6.4–8.2)
RBC # BLD AUTO: 3.4 MILLION/UL (ref 3.81–5.12)
SODIUM SERPL-SCNC: 139 MMOL/L (ref 136–145)
WBC # BLD AUTO: 3.49 THOUSAND/UL (ref 4.31–10.16)

## 2022-07-01 PROCEDURE — 36415 COLL VENOUS BLD VENIPUNCTURE: CPT

## 2022-07-01 PROCEDURE — 80053 COMPREHEN METABOLIC PANEL: CPT

## 2022-07-01 PROCEDURE — 85025 COMPLETE CBC W/AUTO DIFF WBC: CPT

## 2022-07-05 ENCOUNTER — HOSPITAL ENCOUNTER (OUTPATIENT)
Dept: INFUSION CENTER | Facility: CLINIC | Age: 72
Discharge: HOME | End: 2022-07-05
Payer: MEDICARE

## 2022-07-05 VITALS
OXYGEN SATURATION: 98 % | HEART RATE: 64 BPM | DIASTOLIC BLOOD PRESSURE: 71 MMHG | HEIGHT: 64 IN | BODY MASS INDEX: 30.56 KG/M2 | RESPIRATION RATE: 18 BRPM | WEIGHT: 179 LBS | TEMPERATURE: 97.4 F | SYSTOLIC BLOOD PRESSURE: 115 MMHG

## 2022-07-05 DIAGNOSIS — C41.9 MALIGNANT NEOPLASM OF BONE WITH METASTASES (HCC): ICD-10-CM

## 2022-07-05 DIAGNOSIS — C34.92 ADENOCARCINOMA OF LEFT LUNG, STAGE 4 (HCC): ICD-10-CM

## 2022-07-05 DIAGNOSIS — K21.9 GERD WITHOUT ESOPHAGITIS: ICD-10-CM

## 2022-07-05 DIAGNOSIS — T45.1X5A CHEMOTHERAPY INDUCED NEUTROPENIA (HCC): ICD-10-CM

## 2022-07-05 DIAGNOSIS — T45.1X5A CINV (CHEMOTHERAPY-INDUCED NAUSEA AND VOMITING): ICD-10-CM

## 2022-07-05 DIAGNOSIS — R11.2 CINV (CHEMOTHERAPY-INDUCED NAUSEA AND VOMITING): ICD-10-CM

## 2022-07-05 DIAGNOSIS — D70.1 CHEMOTHERAPY INDUCED NEUTROPENIA (HCC): ICD-10-CM

## 2022-07-05 DIAGNOSIS — E83.52 HYPERCALCEMIA: Primary | ICD-10-CM

## 2022-07-05 DIAGNOSIS — C34.12 MALIGNANT NEOPLASM OF UPPER LOBE OF LEFT LUNG (HCC): ICD-10-CM

## 2022-07-05 PROCEDURE — 96367 TX/PROPH/DG ADDL SEQ IV INF: CPT

## 2022-07-05 PROCEDURE — 96409 CHEMO IV PUSH SNGL DRUG: CPT

## 2022-07-05 RX ORDER — OMEPRAZOLE 20 MG/1
20 CAPSULE, DELAYED RELEASE ORAL DAILY
Qty: 90 CAPSULE | Refills: 3 | Status: SHIPPED | OUTPATIENT
Start: 2022-07-05 | End: 2022-09-29

## 2022-07-05 RX ORDER — SODIUM CHLORIDE 9 MG/ML
20 INJECTION, SOLUTION INTRAVENOUS ONCE
Status: COMPLETED | OUTPATIENT
Start: 2022-07-05 | End: 2022-07-05

## 2022-07-05 RX ADMIN — VINORELBINE 47 MG: 10 INJECTION, SOLUTION INTRAVENOUS at 14:17

## 2022-07-05 RX ADMIN — SODIUM CHLORIDE 20 ML/HR: 0.9 INJECTION, SOLUTION INTRAVENOUS at 13:41

## 2022-07-05 RX ADMIN — ONDANSETRON 8 MG: 2 INJECTION INTRAMUSCULAR; INTRAVENOUS at 13:41

## 2022-07-05 NOTE — PROGRESS NOTES
Patient tolerated treatment without incident  Port flushed and de-accessed as per protocol  Patient's next appointment confirmed  AVS declined

## 2022-07-05 NOTE — PROGRESS NOTES
Patient presents today for treatment with Navelbine  Patient offers no complaints  VSS  Labs from 7/1/2022 reviewed and within parameters to treat  Port accessed without incident with excellent blood return noted

## 2022-07-19 RX ORDER — SODIUM CHLORIDE 9 MG/ML
20 INJECTION, SOLUTION INTRAVENOUS ONCE
Status: CANCELLED | OUTPATIENT
Start: 2022-08-08

## 2022-07-19 RX ORDER — SODIUM CHLORIDE 9 MG/ML
20 INJECTION, SOLUTION INTRAVENOUS ONCE
Status: CANCELLED | OUTPATIENT
Start: 2022-07-25

## 2022-07-23 ENCOUNTER — APPOINTMENT (OUTPATIENT)
Dept: LAB | Facility: CLINIC | Age: 72
End: 2022-07-23
Payer: MEDICARE

## 2022-07-23 DIAGNOSIS — T45.1X5A CINV (CHEMOTHERAPY-INDUCED NAUSEA AND VOMITING): ICD-10-CM

## 2022-07-23 DIAGNOSIS — C41.9 MALIGNANT NEOPLASM OF BONE WITH METASTASES (HCC): ICD-10-CM

## 2022-07-23 DIAGNOSIS — T45.1X5A CHEMOTHERAPY INDUCED NEUTROPENIA (HCC): ICD-10-CM

## 2022-07-23 DIAGNOSIS — I10 PRIMARY HYPERTENSION: ICD-10-CM

## 2022-07-23 DIAGNOSIS — E78.2 MIXED HYPERLIPIDEMIA: ICD-10-CM

## 2022-07-23 DIAGNOSIS — E83.52 HYPERCALCEMIA: ICD-10-CM

## 2022-07-23 DIAGNOSIS — E11.9 CONTROLLED TYPE 2 DIABETES MELLITUS WITHOUT COMPLICATION, WITHOUT LONG-TERM CURRENT USE OF INSULIN (HCC): ICD-10-CM

## 2022-07-23 DIAGNOSIS — R11.2 CINV (CHEMOTHERAPY-INDUCED NAUSEA AND VOMITING): ICD-10-CM

## 2022-07-23 DIAGNOSIS — C34.92 ADENOCARCINOMA OF LEFT LUNG, STAGE 4 (HCC): ICD-10-CM

## 2022-07-23 DIAGNOSIS — D70.1 CHEMOTHERAPY INDUCED NEUTROPENIA (HCC): ICD-10-CM

## 2022-07-23 DIAGNOSIS — C34.12 MALIGNANT NEOPLASM OF UPPER LOBE OF LEFT LUNG (HCC): ICD-10-CM

## 2022-07-23 LAB
ALBUMIN SERPL BCP-MCNC: 3.4 G/DL (ref 3.5–5)
ALP SERPL-CCNC: 84 U/L (ref 34–104)
ALT SERPL W P-5'-P-CCNC: 12 U/L (ref 7–52)
ANION GAP SERPL CALCULATED.3IONS-SCNC: 5 MMOL/L (ref 4–13)
AST SERPL W P-5'-P-CCNC: 16 U/L (ref 13–39)
BASOPHILS # BLD AUTO: 0.06 THOUSANDS/ΜL (ref 0–0.1)
BASOPHILS NFR BLD AUTO: 1 % (ref 0–1)
BILIRUB SERPL-MCNC: 0.38 MG/DL (ref 0.2–1)
BUN SERPL-MCNC: 27 MG/DL (ref 5–25)
CALCIUM ALBUM COR SERPL-MCNC: 9.9 MG/DL (ref 8.3–10.1)
CALCIUM SERPL-MCNC: 9.4 MG/DL (ref 8.4–10.2)
CHLORIDE SERPL-SCNC: 107 MMOL/L (ref 96–108)
CO2 SERPL-SCNC: 27 MMOL/L (ref 21–32)
CREAT SERPL-MCNC: 1.55 MG/DL (ref 0.6–1.3)
EOSINOPHIL # BLD AUTO: 0.2 THOUSAND/ΜL (ref 0–0.61)
EOSINOPHIL NFR BLD AUTO: 3 % (ref 0–6)
ERYTHROCYTE [DISTWIDTH] IN BLOOD BY AUTOMATED COUNT: 15.5 % (ref 11.6–15.1)
EST. AVERAGE GLUCOSE BLD GHB EST-MCNC: 146 MG/DL
GFR SERPL CREATININE-BSD FRML MDRD: 32 ML/MIN/1.73SQ M
GLUCOSE P FAST SERPL-MCNC: 172 MG/DL (ref 65–99)
HBA1C MFR BLD: 6.7 %
HCT VFR BLD AUTO: 34.4 % (ref 34.8–46.1)
HGB BLD-MCNC: 10.8 G/DL (ref 11.5–15.4)
IMM GRANULOCYTES # BLD AUTO: 0.04 THOUSAND/UL (ref 0–0.2)
IMM GRANULOCYTES NFR BLD AUTO: 1 % (ref 0–2)
LYMPHOCYTES # BLD AUTO: 1.18 THOUSANDS/ΜL (ref 0.6–4.47)
LYMPHOCYTES NFR BLD AUTO: 19 % (ref 14–44)
MCH RBC QN AUTO: 28.7 PG (ref 26.8–34.3)
MCHC RBC AUTO-ENTMCNC: 31.4 G/DL (ref 31.4–37.4)
MCV RBC AUTO: 92 FL (ref 82–98)
MONOCYTES # BLD AUTO: 0.59 THOUSAND/ΜL (ref 0.17–1.22)
MONOCYTES NFR BLD AUTO: 10 % (ref 4–12)
NEUTROPHILS # BLD AUTO: 4.04 THOUSANDS/ΜL (ref 1.85–7.62)
NEUTS SEG NFR BLD AUTO: 66 % (ref 43–75)
NRBC BLD AUTO-RTO: 0 /100 WBCS
PLATELET # BLD AUTO: 233 THOUSANDS/UL (ref 149–390)
PMV BLD AUTO: 11.2 FL (ref 8.9–12.7)
POTASSIUM SERPL-SCNC: 4.4 MMOL/L (ref 3.5–5.3)
PROT SERPL-MCNC: 6 G/DL (ref 6.4–8.4)
RBC # BLD AUTO: 3.76 MILLION/UL (ref 3.81–5.12)
SODIUM SERPL-SCNC: 139 MMOL/L (ref 135–147)
TSH SERPL DL<=0.05 MIU/L-ACNC: 2.33 UIU/ML (ref 0.45–4.5)
WBC # BLD AUTO: 6.11 THOUSAND/UL (ref 4.31–10.16)

## 2022-07-23 PROCEDURE — 80053 COMPREHEN METABOLIC PANEL: CPT

## 2022-07-23 PROCEDURE — 36415 COLL VENOUS BLD VENIPUNCTURE: CPT

## 2022-07-23 PROCEDURE — 85025 COMPLETE CBC W/AUTO DIFF WBC: CPT

## 2022-07-23 PROCEDURE — 84443 ASSAY THYROID STIM HORMONE: CPT

## 2022-07-23 PROCEDURE — 83036 HEMOGLOBIN GLYCOSYLATED A1C: CPT

## 2022-07-25 ENCOUNTER — HOSPITAL ENCOUNTER (OUTPATIENT)
Dept: INFUSION CENTER | Facility: CLINIC | Age: 72
Discharge: HOME | End: 2022-07-25
Payer: MEDICARE

## 2022-07-25 VITALS
BODY MASS INDEX: 30.73 KG/M2 | SYSTOLIC BLOOD PRESSURE: 124 MMHG | HEIGHT: 64 IN | WEIGHT: 180 LBS | DIASTOLIC BLOOD PRESSURE: 62 MMHG | OXYGEN SATURATION: 99 % | TEMPERATURE: 97 F | HEART RATE: 70 BPM | RESPIRATION RATE: 16 BRPM

## 2022-07-25 DIAGNOSIS — R11.2 CINV (CHEMOTHERAPY-INDUCED NAUSEA AND VOMITING): ICD-10-CM

## 2022-07-25 DIAGNOSIS — C34.92 ADENOCARCINOMA OF LEFT LUNG, STAGE 4 (HCC): ICD-10-CM

## 2022-07-25 DIAGNOSIS — D70.1 CHEMOTHERAPY INDUCED NEUTROPENIA (HCC): ICD-10-CM

## 2022-07-25 DIAGNOSIS — T45.1X5A CINV (CHEMOTHERAPY-INDUCED NAUSEA AND VOMITING): ICD-10-CM

## 2022-07-25 DIAGNOSIS — C41.9 MALIGNANT NEOPLASM OF BONE WITH METASTASES (HCC): ICD-10-CM

## 2022-07-25 DIAGNOSIS — E83.52 HYPERCALCEMIA: Primary | ICD-10-CM

## 2022-07-25 DIAGNOSIS — T45.1X5A CHEMOTHERAPY INDUCED NEUTROPENIA (HCC): ICD-10-CM

## 2022-07-25 DIAGNOSIS — C34.12 MALIGNANT NEOPLASM OF UPPER LOBE OF LEFT LUNG (HCC): ICD-10-CM

## 2022-07-25 PROCEDURE — 96367 TX/PROPH/DG ADDL SEQ IV INF: CPT

## 2022-07-25 PROCEDURE — 96409 CHEMO IV PUSH SNGL DRUG: CPT

## 2022-07-25 RX ORDER — SODIUM CHLORIDE 9 MG/ML
20 INJECTION, SOLUTION INTRAVENOUS ONCE
Status: COMPLETED | OUTPATIENT
Start: 2022-07-25 | End: 2022-07-25

## 2022-07-25 RX ADMIN — SODIUM CHLORIDE 20 ML/HR: 0.9 INJECTION, SOLUTION INTRAVENOUS at 08:47

## 2022-07-25 RX ADMIN — ONDANSETRON 8 MG: 2 INJECTION INTRAMUSCULAR; INTRAVENOUS at 08:47

## 2022-07-25 RX ADMIN — VINORELBINE 47 MG: 10 INJECTION, SOLUTION INTRAVENOUS at 09:15

## 2022-07-25 NOTE — PROGRESS NOTES
Patient present today for treatment with Navelbine  Patient offers no complaints  VSS  Labs reviewed and within parameter to treat  Port accessed without incident with excellent blood return noted

## 2022-07-25 NOTE — PROGRESS NOTES
Patient tolerated treatment without incident  Port flushed and de-accessed as per protocol  Next appointment confirmed  AVS declined

## 2022-08-02 ENCOUNTER — RA CDI HCC (OUTPATIENT)
Dept: OTHER | Facility: HOSPITAL | Age: 72
End: 2022-08-02

## 2022-08-02 ENCOUNTER — TELEPHONE (OUTPATIENT)
Dept: HEMATOLOGY ONCOLOGY | Facility: CLINIC | Age: 72
End: 2022-08-02

## 2022-08-02 NOTE — PROGRESS NOTES
[address]  coding opportunities       Chart reviewed, no opportunity found: CHART REVIEWED, NO OPPORTUNITY FOUND        Patients Insurance     Medicare Insurance: Medicare

## 2022-08-04 ENCOUNTER — TELEPHONE (OUTPATIENT)
Dept: LAB | Facility: HOSPITAL | Age: 72
End: 2022-08-04

## 2022-08-05 ENCOUNTER — APPOINTMENT (OUTPATIENT)
Dept: LAB | Facility: HOSPITAL | Age: 72
End: 2022-08-05
Payer: MEDICARE

## 2022-08-05 DIAGNOSIS — E83.52 HYPERCALCEMIA: ICD-10-CM

## 2022-08-05 DIAGNOSIS — C41.9 MALIGNANT NEOPLASM OF BONE WITH METASTASES (HCC): ICD-10-CM

## 2022-08-05 DIAGNOSIS — D70.1 CHEMOTHERAPY INDUCED NEUTROPENIA (HCC): ICD-10-CM

## 2022-08-05 DIAGNOSIS — R11.2 CINV (CHEMOTHERAPY-INDUCED NAUSEA AND VOMITING): ICD-10-CM

## 2022-08-05 DIAGNOSIS — C34.12 MALIGNANT NEOPLASM OF UPPER LOBE OF LEFT LUNG (HCC): ICD-10-CM

## 2022-08-05 DIAGNOSIS — C34.92 ADENOCARCINOMA OF LEFT LUNG, STAGE 4 (HCC): ICD-10-CM

## 2022-08-05 DIAGNOSIS — T45.1X5A CINV (CHEMOTHERAPY-INDUCED NAUSEA AND VOMITING): ICD-10-CM

## 2022-08-05 DIAGNOSIS — T45.1X5A CHEMOTHERAPY INDUCED NEUTROPENIA (HCC): ICD-10-CM

## 2022-08-05 LAB
ALBUMIN SERPL BCP-MCNC: 2.9 G/DL (ref 3.5–5)
ALP SERPL-CCNC: 84 U/L (ref 46–116)
ALT SERPL W P-5'-P-CCNC: 22 U/L (ref 12–78)
ANION GAP SERPL CALCULATED.3IONS-SCNC: 6 MMOL/L (ref 4–13)
AST SERPL W P-5'-P-CCNC: 22 U/L (ref 5–45)
BASOPHILS # BLD AUTO: 0.04 THOUSANDS/ΜL (ref 0–0.1)
BASOPHILS NFR BLD AUTO: 1 % (ref 0–1)
BILIRUB SERPL-MCNC: 0.39 MG/DL (ref 0.2–1)
BUN SERPL-MCNC: 33 MG/DL (ref 5–25)
CALCIUM ALBUM COR SERPL-MCNC: 10.4 MG/DL (ref 8.3–10.1)
CALCIUM SERPL-MCNC: 9.5 MG/DL (ref 8.3–10.1)
CHLORIDE SERPL-SCNC: 104 MMOL/L (ref 96–108)
CO2 SERPL-SCNC: 25 MMOL/L (ref 21–32)
CREAT SERPL-MCNC: 1.77 MG/DL (ref 0.6–1.3)
EOSINOPHIL # BLD AUTO: 0.16 THOUSAND/ΜL (ref 0–0.61)
EOSINOPHIL NFR BLD AUTO: 3 % (ref 0–6)
ERYTHROCYTE [DISTWIDTH] IN BLOOD BY AUTOMATED COUNT: 15.1 % (ref 11.6–15.1)
GFR SERPL CREATININE-BSD FRML MDRD: 27 ML/MIN/1.73SQ M
GLUCOSE SERPL-MCNC: 217 MG/DL (ref 65–140)
HCT VFR BLD AUTO: 34.1 % (ref 34.8–46.1)
HGB BLD-MCNC: 10.5 G/DL (ref 11.5–15.4)
IMM GRANULOCYTES # BLD AUTO: 0.01 THOUSAND/UL (ref 0–0.2)
IMM GRANULOCYTES NFR BLD AUTO: 0 % (ref 0–2)
LYMPHOCYTES # BLD AUTO: 0.66 THOUSANDS/ΜL (ref 0.6–4.47)
LYMPHOCYTES NFR BLD AUTO: 14 % (ref 14–44)
MCH RBC QN AUTO: 28.3 PG (ref 26.8–34.3)
MCHC RBC AUTO-ENTMCNC: 30.8 G/DL (ref 31.4–37.4)
MCV RBC AUTO: 92 FL (ref 82–98)
MONOCYTES # BLD AUTO: 0.3 THOUSAND/ΜL (ref 0.17–1.22)
MONOCYTES NFR BLD AUTO: 6 % (ref 4–12)
NEUTROPHILS # BLD AUTO: 3.64 THOUSANDS/ΜL (ref 1.85–7.62)
NEUTS SEG NFR BLD AUTO: 76 % (ref 43–75)
NRBC BLD AUTO-RTO: 0 /100 WBCS
PLATELET # BLD AUTO: 217 THOUSANDS/UL (ref 149–390)
PMV BLD AUTO: 11.6 FL (ref 8.9–12.7)
POTASSIUM SERPL-SCNC: 5 MMOL/L (ref 3.5–5.3)
PROT SERPL-MCNC: 6.3 G/DL (ref 6.4–8.4)
RBC # BLD AUTO: 3.71 MILLION/UL (ref 3.81–5.12)
SODIUM SERPL-SCNC: 135 MMOL/L (ref 135–147)
WBC # BLD AUTO: 4.81 THOUSAND/UL (ref 4.31–10.16)

## 2022-08-05 PROCEDURE — 85025 COMPLETE CBC W/AUTO DIFF WBC: CPT

## 2022-08-05 PROCEDURE — 36415 COLL VENOUS BLD VENIPUNCTURE: CPT

## 2022-08-05 PROCEDURE — 80053 COMPREHEN METABOLIC PANEL: CPT

## 2022-08-06 DIAGNOSIS — I10 PRIMARY HYPERTENSION: ICD-10-CM

## 2022-08-07 RX ORDER — VALSARTAN 160 MG/1
TABLET ORAL
Qty: 180 TABLET | Refills: 1 | Status: SHIPPED | OUTPATIENT
Start: 2022-08-07

## 2022-08-08 ENCOUNTER — HOSPITAL ENCOUNTER (OUTPATIENT)
Dept: INFUSION CENTER | Facility: CLINIC | Age: 72
Discharge: HOME | End: 2022-08-08
Payer: MEDICARE

## 2022-08-08 VITALS
WEIGHT: 182 LBS | HEART RATE: 62 BPM | OXYGEN SATURATION: 99 % | HEIGHT: 64 IN | TEMPERATURE: 97.9 F | RESPIRATION RATE: 18 BRPM | BODY MASS INDEX: 31.07 KG/M2 | SYSTOLIC BLOOD PRESSURE: 112 MMHG | DIASTOLIC BLOOD PRESSURE: 65 MMHG

## 2022-08-08 DIAGNOSIS — E83.52 HYPERCALCEMIA: Primary | ICD-10-CM

## 2022-08-08 DIAGNOSIS — T45.1X5A CHEMOTHERAPY INDUCED NEUTROPENIA (HCC): ICD-10-CM

## 2022-08-08 DIAGNOSIS — R11.2 CINV (CHEMOTHERAPY-INDUCED NAUSEA AND VOMITING): ICD-10-CM

## 2022-08-08 DIAGNOSIS — D70.1 CHEMOTHERAPY INDUCED NEUTROPENIA (HCC): ICD-10-CM

## 2022-08-08 DIAGNOSIS — C34.12 MALIGNANT NEOPLASM OF UPPER LOBE OF LEFT LUNG (HCC): ICD-10-CM

## 2022-08-08 DIAGNOSIS — C41.9 MALIGNANT NEOPLASM OF BONE WITH METASTASES (HCC): ICD-10-CM

## 2022-08-08 DIAGNOSIS — T45.1X5A CINV (CHEMOTHERAPY-INDUCED NAUSEA AND VOMITING): ICD-10-CM

## 2022-08-08 DIAGNOSIS — C34.92 ADENOCARCINOMA OF LEFT LUNG, STAGE 4 (HCC): ICD-10-CM

## 2022-08-08 PROCEDURE — 96409 CHEMO IV PUSH SNGL DRUG: CPT

## 2022-08-08 PROCEDURE — 96367 TX/PROPH/DG ADDL SEQ IV INF: CPT

## 2022-08-08 RX ORDER — SODIUM CHLORIDE 9 MG/ML
20 INJECTION, SOLUTION INTRAVENOUS ONCE
Status: COMPLETED | OUTPATIENT
Start: 2022-08-08 | End: 2022-08-08

## 2022-08-08 RX ADMIN — VINORELBINE 47 MG: 10 INJECTION, SOLUTION INTRAVENOUS at 11:47

## 2022-08-08 RX ADMIN — SODIUM CHLORIDE 20 ML/HR: 0.9 INJECTION, SOLUTION INTRAVENOUS at 11:08

## 2022-08-08 RX ADMIN — ONDANSETRON 8 MG: 2 INJECTION INTRAMUSCULAR; INTRAVENOUS at 11:08

## 2022-08-08 NOTE — PROGRESS NOTES
Patient to infusion for Navelbine  She offers no complaints  Labs reviewed from 8/5/22  Port accessed, good blood return noted  Patient tolerated treatment today without incident    Next appointment confirmed, she declined AVS

## 2022-08-09 ENCOUNTER — OFFICE VISIT (OUTPATIENT)
Dept: FAMILY MEDICINE CLINIC | Facility: CLINIC | Age: 72
End: 2022-08-09
Payer: MEDICARE

## 2022-08-09 VITALS
BODY MASS INDEX: 32.46 KG/M2 | WEIGHT: 183.2 LBS | TEMPERATURE: 96.7 F | HEART RATE: 70 BPM | SYSTOLIC BLOOD PRESSURE: 110 MMHG | RESPIRATION RATE: 18 BRPM | DIASTOLIC BLOOD PRESSURE: 50 MMHG | HEIGHT: 63 IN | OXYGEN SATURATION: 97 %

## 2022-08-09 DIAGNOSIS — I10 PRIMARY HYPERTENSION: ICD-10-CM

## 2022-08-09 DIAGNOSIS — N18.4 STAGE 4 CHRONIC KIDNEY DISEASE (HCC): ICD-10-CM

## 2022-08-09 DIAGNOSIS — E11.9 TYPE 2 DIABETES MELLITUS WITHOUT COMPLICATION, WITHOUT LONG-TERM CURRENT USE OF INSULIN (HCC): Primary | ICD-10-CM

## 2022-08-09 DIAGNOSIS — C34.90 ADENOCARCINOMA OF LUNG, STAGE 4, UNSPECIFIED LATERALITY (HCC): ICD-10-CM

## 2022-08-09 DIAGNOSIS — E78.2 MIXED HYPERLIPIDEMIA: ICD-10-CM

## 2022-08-09 PROBLEM — M65.341 ACQUIRED TRIGGER FINGER OF RIGHT RING FINGER: Status: ACTIVE | Noted: 2022-06-01

## 2022-08-09 PROCEDURE — 99214 OFFICE O/P EST MOD 30 MIN: CPT | Performed by: FAMILY MEDICINE

## 2022-08-09 NOTE — PROGRESS NOTES
Patient's shoes and socks removed  Right Foot/Ankle   Right Foot Inspection  Skin Exam: skin normal, skin intact and dry skin  No warmth, no callus, no erythema, no maceration, no abnormal color, no pre-ulcer, no ulcer and no callus  Sensory   Monofilament testing: intact    Vascular  Capillary refills: < 3 seconds  The right DP pulse is 2+  The right PT pulse is 2+  Left Foot/Ankle  Left Foot Inspection  Skin Exam: skin normal, skin intact and dry skin  No warmth, no erythema, no maceration, normal color, no pre-ulcer, no ulcer and no callus  Sensory   Monofilament testing: intact    Vascular  Capillary refills: < 3 seconds  The left DP pulse is 2+  The left PT pulse is 2+  Assign Risk Category  No deformity present  No loss of protective sensation  No weak pulses  Risk: 0    Assessment/Plan:    1  Type 2 diabetes mellitus without complication, without long-term current use of insulin (HCC)  Assessment & Plan:  Stable on current meds  Lab Results   Component Value Date    HGBA1C 6 7 (H) 07/23/2022       Orders:  -     Hemoglobin A1C; Future; Expected date: 11/01/2022    2  Primary hypertension  Assessment & Plan:  Stable on current meds      3  Mixed hyperlipidemia  Assessment & Plan: On lipitor  Needs to do lipids while fasting      4  Stage 4 chronic kidney disease [facility])  Assessment & Plan:  Lab Results   Component Value Date    EGFR 27 08/05/2022    EGFR 32 07/23/2022    EGFR 28 07/01/2022    CREATININE 1 77 (H) 08/05/2022    CREATININE 1 55 (H) 07/23/2022    CREATININE 1 74 (H) 07/01/2022   likely due to chemotherapy and diabetes      5  Adenocarcinoma of lung, stage 4, unspecified laterality [facility])  Assessment & Plan:  Seeing Dr Lu Bernal              There are no Patient Instructions on file for this visit  Return in about 3 months (around 11/9/2022) for Recheck  Subjective:      Patient ID: Sneha De Jesus is a 74 y o  female      Chief Complaint   Patient presents with    Follow-up Patient here for 4 month follow up        Here for follow up  Knee pain- had 1 injection, surgeon will not do knee replacement  Seeing neurology- would like to start driving again  Using walker for stability with knee  Didn't do fasting labs    Hypertension  This is a chronic problem  The current episode started more than 1 year ago  The problem is unchanged  The problem is controlled  There are no associated agents to hypertension  Past treatments include angiotensin blockers  The current treatment provides significant improvement  There are no compliance problems  Diabetes  She presents for her follow-up diabetic visit  She has type 2 diabetes mellitus  Her disease course has been stable  There are no hypoglycemic associated symptoms  Associated symptoms include fatigue  There are no hypoglycemic complications  Symptoms are stable  There are no diabetic complications  Risk factors for coronary artery disease include diabetes mellitus, dyslipidemia and hypertension  Current diabetic treatment includes oral agent (dual therapy)  She is compliant with treatment all of the time  Her weight is stable  She is following a diabetic diet  She has not had a previous visit with a dietitian  She rarely participates in exercise  There is no change in her home blood glucose trend  An ACE inhibitor/angiotensin II receptor blocker is being taken  She does not see a podiatrist Eye exam is not current  The following portions of the patient's history were reviewed and updated as appropriate: allergies, current medications, past family history, past medical history, past social history, past surgical history and problem list     Review of Systems   Constitutional: Positive for fatigue  Eyes: Negative  Respiratory: Negative  Cardiovascular: Negative  Gastrointestinal: Negative  Endocrine: Negative  Genitourinary: Negative  Musculoskeletal: Positive for arthralgias and gait problem     Allergic/Immunologic: Negative  Hematological: Negative  Psychiatric/Behavioral: Negative  Current Outpatient Medications   Medication Sig Dispense Refill    acetaminophen (TYLENOL) 325 mg tablet Take 650 mg by mouth every 6 (six) hours as needed for mild pain      apixaban (Eliquis) 5 mg TAKE 1 TABLET TWICE A  tablet 3    aspirin (ECOTRIN LOW STRENGTH) 81 mg EC tablet Take 1 tablet (81 mg total) by mouth daily 30 tablet 0    atorvastatin (LIPITOR) 40 mg tablet TAKE 1 TABLET BY MOUTH EVERY DAY 90 tablet 1    busPIRone (BUSPAR) 7 5 mg tablet TAKE 1 TABLET BY MOUTH 2 TIMES A DAY   180 tablet 0    cyanocobalamin (VITAMIN B-12) 100 mcg tablet Take by mouth daily      folic acid (FOLVITE) 1 mg tablet Take 1 tablet (1 mg total) by mouth daily (Patient taking differently: Take 1 mg by mouth as needed) 90 tablet 1    furosemide (LASIX) 20 mg tablet TAKE 1 TABLET (20 MG TOTAL) BY MOUTH DAILY AS NEEDED (LEG EDEMA) 90 tablet 1    Klor-Con M10 10 MEQ tablet TAKE 1 TABLET BY MOUTH EVERY DAY (Patient taking differently: as needed) 30 tablet 5    lidocaine-prilocaine (EMLA) cream Apply to port site 30 minutes prior to infusion and cover with a dressing 30 g 1    linaGLIPtin (Tradjenta) 5 MG TABS Take 5 mg by mouth daily 90 tablet 3    loperamide (IMODIUM) 2 mg capsule Take 2 mg by mouth 4 (four) times a day as needed for diarrhea        loratadine (CLARITIN) 10 mg tablet Take 10 mg by mouth as needed        metFORMIN (GLUCOPHAGE) 500 mg tablet Take 1 tablet (500 mg total) by mouth 2 (two) times a day with meals 180 tablet 3    metoprolol tartrate (LOPRESSOR) 25 mg tablet Take 1 tablet (25 mg total) by mouth every 12 (twelve) hours 180 tablet 3    omeprazole (PriLOSEC) 20 mg delayed release capsule Take 1 capsule (20 mg total) by mouth daily 90 capsule 3    oxyCODONE (Roxicodone) 5 immediate release tablet Take 1 tablet (5 mg total) by mouth every 6 (six) hours as needed for moderate pain or severe pain Max Daily Amount: 20 mg 30 tablet 0    sotalol (BETAPACE) 80 mg tablet Take 1 tablet (80 mg total) by mouth 2 (two) times a day 180 tablet 3    valsartan (DIOVAN) 160 mg tablet TAKE 1 TABLET BY MOUTH 2 TIMES A DAY  180 tablet 1    venlafaxine (EFFEXOR) 37 5 mg tablet Take 1 tablet (37 5 mg total) by mouth daily 90 tablet 3     No current facility-administered medications for this visit  Objective:    /50 (BP Location: Left arm, Patient Position: Sitting, Cuff Size: Standard)   Pulse 70   Temp (!) 96 7 °F (35 9 °C)   Resp 18   Ht 5' 3 39" (1 61 m)   Wt 83 1 kg (183 lb 3 2 oz)   LMP  (LMP Unknown)   SpO2 97%   BMI 32 06 kg/m²        Physical Exam  Vitals and nursing note reviewed  Constitutional:       Appearance: Normal appearance  HENT:      Head: Normocephalic and atraumatic  Eyes:      Extraocular Movements: Extraocular movements intact  Pupils: Pupils are equal, round, and reactive to light  Cardiovascular:      Rate and Rhythm: Normal rate and regular rhythm  Pulses: Normal pulses  no weak pulses          Dorsalis pedis pulses are 2+ on the right side and 2+ on the left side  Posterior tibial pulses are 2+ on the right side and 2+ on the left side  Heart sounds: Normal heart sounds  Pulmonary:      Effort: Pulmonary effort is normal       Breath sounds: Normal breath sounds  Abdominal:      General: Abdomen is flat  Palpations: Abdomen is soft  Musculoskeletal:         General: Normal range of motion  Cervical back: Normal range of motion and neck supple  Feet:      Right foot:      Skin integrity: Dry skin present  No ulcer, skin breakdown, erythema, warmth or callus  Left foot:      Skin integrity: Dry skin present  No ulcer, skin breakdown, erythema, warmth or callus  Skin:     General: Skin is warm  Capillary Refill: Capillary refill takes less than 2 seconds  Neurological:      General: No focal deficit present        Mental Status: She is alert and oriented to person, place, and time  Psychiatric:         Mood and Affect: Mood normal          Behavior: Behavior normal          Thought Content:  Thought content normal          Judgment: Judgment normal                 Stephen Lopez, DO

## 2022-08-09 NOTE — ASSESSMENT & PLAN NOTE
Lab Results   Component Value Date    EGFR 27 08/05/2022    EGFR 32 07/23/2022    EGFR 28 07/01/2022    CREATININE 1 77 (H) 08/05/2022    CREATININE 1 55 (H) 07/23/2022    CREATININE 1 74 (H) 07/01/2022   likely due to chemotherapy and diabetes

## 2022-08-15 ENCOUNTER — TELEPHONE (OUTPATIENT)
Dept: LAB | Facility: HOSPITAL | Age: 72
End: 2022-08-15

## 2022-08-15 RX ORDER — SODIUM CHLORIDE 9 MG/ML
20 INJECTION, SOLUTION INTRAVENOUS ONCE
Status: CANCELLED | OUTPATIENT
Start: 2022-09-06

## 2022-08-15 RX ORDER — SODIUM CHLORIDE 9 MG/ML
20 INJECTION, SOLUTION INTRAVENOUS ONCE
Status: CANCELLED | OUTPATIENT
Start: 2022-08-22

## 2022-08-19 ENCOUNTER — APPOINTMENT (OUTPATIENT)
Dept: LAB | Facility: HOSPITAL | Age: 72
End: 2022-08-19
Attending: INTERNAL MEDICINE
Payer: MEDICARE

## 2022-08-19 DIAGNOSIS — E83.52 HYPERCALCEMIA: ICD-10-CM

## 2022-08-19 DIAGNOSIS — C41.9 MALIGNANT NEOPLASM OF BONE WITH METASTASES (HCC): ICD-10-CM

## 2022-08-19 DIAGNOSIS — C34.92 ADENOCARCINOMA OF LEFT LUNG, STAGE 4 (HCC): ICD-10-CM

## 2022-08-19 DIAGNOSIS — R11.2 CINV (CHEMOTHERAPY-INDUCED NAUSEA AND VOMITING): ICD-10-CM

## 2022-08-19 DIAGNOSIS — T45.1X5A CINV (CHEMOTHERAPY-INDUCED NAUSEA AND VOMITING): ICD-10-CM

## 2022-08-19 DIAGNOSIS — T45.1X5A CHEMOTHERAPY INDUCED NEUTROPENIA (HCC): ICD-10-CM

## 2022-08-19 DIAGNOSIS — D70.1 CHEMOTHERAPY INDUCED NEUTROPENIA (HCC): ICD-10-CM

## 2022-08-19 DIAGNOSIS — C34.12 MALIGNANT NEOPLASM OF UPPER LOBE OF LEFT LUNG (HCC): ICD-10-CM

## 2022-08-19 LAB
ALBUMIN SERPL BCP-MCNC: 3.1 G/DL (ref 3.5–5)
ALP SERPL-CCNC: 102 U/L (ref 46–116)
ALT SERPL W P-5'-P-CCNC: 23 U/L (ref 12–78)
ANION GAP SERPL CALCULATED.3IONS-SCNC: 6 MMOL/L (ref 4–13)
AST SERPL W P-5'-P-CCNC: 22 U/L (ref 5–45)
BASOPHILS # BLD AUTO: 0.04 THOUSANDS/ΜL (ref 0–0.1)
BASOPHILS NFR BLD AUTO: 1 % (ref 0–1)
BILIRUB SERPL-MCNC: 0.52 MG/DL (ref 0.2–1)
BUN SERPL-MCNC: 33 MG/DL (ref 5–25)
CALCIUM ALBUM COR SERPL-MCNC: 9.9 MG/DL (ref 8.3–10.1)
CALCIUM SERPL-MCNC: 9.2 MG/DL (ref 8.3–10.1)
CHLORIDE SERPL-SCNC: 107 MMOL/L (ref 96–108)
CO2 SERPL-SCNC: 27 MMOL/L (ref 21–32)
CREAT SERPL-MCNC: 1.59 MG/DL (ref 0.6–1.3)
EOSINOPHIL # BLD AUTO: 0.14 THOUSAND/ΜL (ref 0–0.61)
EOSINOPHIL NFR BLD AUTO: 3 % (ref 0–6)
ERYTHROCYTE [DISTWIDTH] IN BLOOD BY AUTOMATED COUNT: 15.1 % (ref 11.6–15.1)
GFR SERPL CREATININE-BSD FRML MDRD: 31 ML/MIN/1.73SQ M
GLUCOSE SERPL-MCNC: 143 MG/DL (ref 65–140)
HCT VFR BLD AUTO: 35.7 % (ref 34.8–46.1)
HGB BLD-MCNC: 10.8 G/DL (ref 11.5–15.4)
IMM GRANULOCYTES # BLD AUTO: 0.03 THOUSAND/UL (ref 0–0.2)
IMM GRANULOCYTES NFR BLD AUTO: 1 % (ref 0–2)
LYMPHOCYTES # BLD AUTO: 0.89 THOUSANDS/ΜL (ref 0.6–4.47)
LYMPHOCYTES NFR BLD AUTO: 16 % (ref 14–44)
MCH RBC QN AUTO: 27.1 PG (ref 26.8–34.3)
MCHC RBC AUTO-ENTMCNC: 30.3 G/DL (ref 31.4–37.4)
MCV RBC AUTO: 90 FL (ref 82–98)
MONOCYTES # BLD AUTO: 0.46 THOUSAND/ΜL (ref 0.17–1.22)
MONOCYTES NFR BLD AUTO: 8 % (ref 4–12)
NEUTROPHILS # BLD AUTO: 3.97 THOUSANDS/ΜL (ref 1.85–7.62)
NEUTS SEG NFR BLD AUTO: 71 % (ref 43–75)
NRBC BLD AUTO-RTO: 0 /100 WBCS
PLATELET # BLD AUTO: 243 THOUSANDS/UL (ref 149–390)
PMV BLD AUTO: 11.8 FL (ref 8.9–12.7)
POTASSIUM SERPL-SCNC: 4.8 MMOL/L (ref 3.5–5.3)
PROT SERPL-MCNC: 6.1 G/DL (ref 6.4–8.4)
RBC # BLD AUTO: 3.98 MILLION/UL (ref 3.81–5.12)
SODIUM SERPL-SCNC: 140 MMOL/L (ref 135–147)
WBC # BLD AUTO: 5.53 THOUSAND/UL (ref 4.31–10.16)

## 2022-08-19 PROCEDURE — 85025 COMPLETE CBC W/AUTO DIFF WBC: CPT

## 2022-08-19 PROCEDURE — 80053 COMPREHEN METABOLIC PANEL: CPT

## 2022-08-19 PROCEDURE — 36415 COLL VENOUS BLD VENIPUNCTURE: CPT

## 2022-08-22 ENCOUNTER — TELEPHONE (OUTPATIENT)
Dept: HEMATOLOGY ONCOLOGY | Facility: CLINIC | Age: 72
End: 2022-08-22

## 2022-08-22 ENCOUNTER — HOSPITAL ENCOUNTER (OUTPATIENT)
Dept: INFUSION CENTER | Facility: CLINIC | Age: 72
Discharge: HOME | End: 2022-08-22
Payer: MEDICARE

## 2022-08-22 VITALS
OXYGEN SATURATION: 98 % | HEIGHT: 64 IN | SYSTOLIC BLOOD PRESSURE: 117 MMHG | RESPIRATION RATE: 19 BRPM | HEART RATE: 60 BPM | TEMPERATURE: 97.7 F | WEIGHT: 182.5 LBS | DIASTOLIC BLOOD PRESSURE: 76 MMHG | BODY MASS INDEX: 31.16 KG/M2

## 2022-08-22 DIAGNOSIS — C41.9 MALIGNANT NEOPLASM OF BONE WITH METASTASES (HCC): ICD-10-CM

## 2022-08-22 DIAGNOSIS — C34.12 MALIGNANT NEOPLASM OF UPPER LOBE OF LEFT LUNG (HCC): ICD-10-CM

## 2022-08-22 DIAGNOSIS — T45.1X5A CINV (CHEMOTHERAPY-INDUCED NAUSEA AND VOMITING): ICD-10-CM

## 2022-08-22 DIAGNOSIS — D70.1 CHEMOTHERAPY INDUCED NEUTROPENIA (HCC): ICD-10-CM

## 2022-08-22 DIAGNOSIS — T45.1X5A CHEMOTHERAPY INDUCED NEUTROPENIA (HCC): ICD-10-CM

## 2022-08-22 DIAGNOSIS — R11.2 CINV (CHEMOTHERAPY-INDUCED NAUSEA AND VOMITING): ICD-10-CM

## 2022-08-22 DIAGNOSIS — C34.92 ADENOCARCINOMA OF LEFT LUNG, STAGE 4 (HCC): ICD-10-CM

## 2022-08-22 DIAGNOSIS — E83.52 HYPERCALCEMIA: Primary | ICD-10-CM

## 2022-08-22 PROCEDURE — 96409 CHEMO IV PUSH SNGL DRUG: CPT

## 2022-08-22 PROCEDURE — 96367 TX/PROPH/DG ADDL SEQ IV INF: CPT

## 2022-08-22 RX ORDER — SODIUM CHLORIDE 9 MG/ML
20 INJECTION, SOLUTION INTRAVENOUS ONCE
Status: COMPLETED | OUTPATIENT
Start: 2022-08-22 | End: 2022-08-22

## 2022-08-22 RX ADMIN — VINORELBINE 47 MG: 10 INJECTION, SOLUTION INTRAVENOUS at 13:43

## 2022-08-22 RX ADMIN — ONDANSETRON 8 MG: 2 INJECTION INTRAMUSCULAR; INTRAVENOUS at 13:13

## 2022-08-22 RX ADMIN — SODIUM CHLORIDE 20 ML/HR: 0.9 INJECTION, SOLUTION INTRAVENOUS at 13:13

## 2022-08-22 NOTE — TELEPHONE ENCOUNTER
I called and left a message explaining that Dr Shraddha Jefferson would not be in the office on 9/8 when she was due for her follow up  However, [facility] does have an opening that same day 20 minutes later  She was moved to that spot and I called Star and gave them the updated time  I will also send a My Chart message too

## 2022-08-22 NOTE — PROGRESS NOTES
Patient presents today for treatment with Navelbine  Patient offers no complaints  VSS  Labs from 8/19/2022 reviewed and within parameters to treat  Port accessed without incident, excellent blood return noted

## 2022-08-29 ENCOUNTER — TELEPHONE (OUTPATIENT)
Dept: LAB | Facility: HOSPITAL | Age: 72
End: 2022-08-29

## 2022-08-30 ENCOUNTER — HOSPITAL ENCOUNTER (OUTPATIENT)
Dept: CT IMAGING | Facility: HOSPITAL | Age: 72
Discharge: HOME | End: 2022-08-30
Payer: MEDICARE

## 2022-08-30 ENCOUNTER — TELEPHONE (OUTPATIENT)
Dept: LAB | Facility: HOSPITAL | Age: 72
End: 2022-08-30

## 2022-08-30 DIAGNOSIS — C34.12 MALIGNANT NEOPLASM OF UPPER LOBE OF LEFT LUNG (HCC): ICD-10-CM

## 2022-08-30 PROCEDURE — 71250 CT THORAX DX C-: CPT

## 2022-09-01 ENCOUNTER — OFFICE VISIT (OUTPATIENT)
Dept: CARDIOLOGY CLINIC | Facility: CLINIC | Age: 72
End: 2022-09-01
Payer: MEDICARE

## 2022-09-01 VITALS
WEIGHT: 182.1 LBS | OXYGEN SATURATION: 97 % | DIASTOLIC BLOOD PRESSURE: 72 MMHG | SYSTOLIC BLOOD PRESSURE: 118 MMHG | BODY MASS INDEX: 31.09 KG/M2 | HEART RATE: 67 BPM | HEIGHT: 64 IN

## 2022-09-01 DIAGNOSIS — I48.91 ATRIAL FIBRILLATION, UNSPECIFIED TYPE (HCC): Primary | ICD-10-CM

## 2022-09-01 DIAGNOSIS — E78.2 MIXED HYPERLIPIDEMIA: ICD-10-CM

## 2022-09-01 DIAGNOSIS — I48.0 PAROXYSMAL ATRIAL FIBRILLATION (HCC): ICD-10-CM

## 2022-09-01 DIAGNOSIS — C34.90 ADENOCARCINOMA OF LUNG, STAGE 4, UNSPECIFIED LATERALITY (HCC): ICD-10-CM

## 2022-09-01 DIAGNOSIS — I10 PRIMARY HYPERTENSION: ICD-10-CM

## 2022-09-01 PROCEDURE — 93000 ELECTROCARDIOGRAM COMPLETE: CPT | Performed by: INTERNAL MEDICINE

## 2022-09-01 PROCEDURE — 99214 OFFICE O/P EST MOD 30 MIN: CPT | Performed by: INTERNAL MEDICINE

## 2022-09-01 NOTE — PROGRESS NOTES
Cardiology   Graciela Alegre Hero 74 y o  female MRN: [id number]        Impression:  1  Paroxysmal atrial fibrillation - in normal sinus rhythm  On antiarrhythmic medications  On anticoagulation  2  Hypertension - controlled  3  Dyslipidemia - unable to tolerate statin or Zetia  4  Stage IV Lung Ca     Recommendations:  1  Continue current medications  2  Follow up in 6 months          HPI: Rich Oh is a 74y o  year old female with hypertension, diabetes, stage IV lung ca, paroxysmal atrial fibrillation, who returns for follow up  Feels fatigued from chemo  No chest pain, shortness of breath, or palpitations            Review of Systems      Past Medical History:   Diagnosis Date    [facility]fib ([facility] )     Arthritis     Atrial fibrillation ([facility] )     Confusion     Diabetes mellitus ([facility] )     Diabetes mellitus type 2, uncomplicated (HCC)     Last assessed: 8/17/17    Encephalopathy 1/6/2022    Essential hypertension     Frozen shoulder     L shoulder    GERD (gastroesophageal reflux disease)     Hx of cancer of uterus     Last assessed: 8/21/15    Hyperlipidemia     Hypertension     Hyponatremia 11/16/2016    Lung mass     diagnosed 9/2016    Malignant neoplasm without specification of site ([facility] )     Skin cancer, basal cell     right eye area    Stage 4 lung cancer ([facility] )      Past Surgical History:   Procedure Laterality Date    APPENDECTOMY      BRONCHOSCOPY N/A 11/17/2016    Procedure: BRONCHOSCOPY FLEXIBLE;  Surgeon: Mari Giraldo MD;  [facility];  Service:    Rhona Langford CHOLECYSTECTOMY      COLONOSCOPY      COLONOSCOPY      FL GUIDED CENTRAL VENOUS ACCESS DEVICE INSERTION  12/14/2021    GALLBLADDER SURGERY      HYSTERECTOMY  2000    Total abdominal    JOINT REPLACEMENT      LARYNGOSCOPY      Flexible Fiberoptic, (Therapeutic), Resolved: 11/17/16    MOHS SURGERY      Micrographic Surgery Face    ND BRONCHOSCOPY NEEDLE BX TRACHEA MAIN STEM&/BRON N/A 11/17/2016    Procedure: EBUS; FROZEN SECTION ;  Surgeon: Alexis Winn MD;  [facility];  Service: Thoracic    NH [address] N/A 5/24/2017    Procedure: Devan Moreno;  Surgeon: Amanda Morillo MD;  Location: [facility]; Service: Pulmonary    NH INSJ TUNNELED CTR VAD W/SUBQ PORT AGE 5 YR/> N/A 12/14/2021    Procedure: INSERTION VENOUS PORT ( PORT-A-CATH) IR;  Surgeon: Joy Eli DO;  [facility];  Service: Interventional Radiology    TONSILECTOMY AND ADNOIDECTOMY      TONSILLECTOMY      TOTAL KNEE ARTHROPLASTY Right 09/23/2014     Social History     Substance and Sexual Activity   Alcohol Use No     Social History     Substance and Sexual Activity   Drug Use No     Social History     Tobacco Use   Smoking Status Never Smoker   Smokeless Tobacco Never Used   Tobacco Comment    Quit in 2000     Family History   Problem Relation Age of Onset    Heart disease Father         cardiac disorder    Hypertension Father     Arthritis Father     Stroke Father         cerebrovascular accident    Skin cancer Father     Diabetes Mother     Heart disease Mother    [facility] Dementia Mother     Hypertension Mother     Thyroid disease Mother     Cancer Maternal Grandfather         of unknown origin    Cancer Family     Depression Family     Diabetes Family     Hyperlipidemia Family         essential    Heart disease Family     Hypertension Family     Stroke Family         syndrome    Lung cancer Paternal Uncle     Muscular dystrophy Brother        Allergies:   Allergies   Allergen Reactions    Amoxicillin Hives    Amoxicillin Rash and Hives    Cardizem [Diltiazem] Rash     Rash      Statins Myalgia     Severe muscle aching  Terrible pains    Zofran [Ondansetron] Palpitations       Medications:     Current Outpatient Medications:     acetaminophen (TYLENOL) 325 mg tablet, Take 650 mg by mouth every 6 (six) hours as needed for mild pain, Disp: , Rfl:     apixaban (Eliquis) 5 mg, TAKE 1 TABLET TWICE A DAY, Disp: 180 tablet, Rfl: 3    aspirin (ECOTRIN LOW STRENGTH) 81 mg EC tablet, Take 1 tablet (81 mg total) by mouth daily, Disp: 30 tablet, Rfl: 0    atorvastatin (LIPITOR) 40 mg tablet, TAKE 1 TABLET BY MOUTH EVERY DAY, Disp: 90 tablet, Rfl: 1    busPIRone (BUSPAR) 7 5 mg tablet, TAKE 1 TABLET BY MOUTH 2 TIMES A DAY , Disp: 180 tablet, Rfl: 0    cyanocobalamin (VITAMIN B-12) 100 mcg tablet, Take by mouth daily, Disp: , Rfl:     folic acid (FOLVITE) 1 mg tablet, Take 1 tablet (1 mg total) by mouth daily (Patient taking differently: Take 1 mg by mouth as needed), Disp: 90 tablet, Rfl: 1    furosemide (LASIX) 20 mg tablet, TAKE 1 TABLET (20 MG TOTAL) BY MOUTH DAILY AS NEEDED (LEG EDEMA), Disp: 90 tablet, Rfl: 1    linaGLIPtin (Tradjenta) 5 MG TABS, Take 5 mg by mouth daily, Disp: 90 tablet, Rfl: 3    loperamide (IMODIUM) 2 mg capsule, Take 2 mg by mouth 4 (four) times a day as needed for diarrhea  , Disp: , Rfl:     loratadine (CLARITIN) 10 mg tablet, Take 10 mg by mouth as needed  , Disp: , Rfl:     metFORMIN (GLUCOPHAGE) 500 mg tablet, Take 1 tablet (500 mg total) by mouth 2 (two) times a day with meals (Patient taking differently: Take 1,000 mg by mouth 2 (two) times a day with meals), Disp: 180 tablet, Rfl: 3    metoprolol tartrate (LOPRESSOR) 25 mg tablet, Take 1 tablet (25 mg total) by mouth every 12 (twelve) hours, Disp: 180 tablet, Rfl: 3    omeprazole (PriLOSEC) 20 mg delayed release capsule, Take 1 capsule (20 mg total) by mouth daily, Disp: 90 capsule, Rfl: 3    sotalol (BETAPACE) 80 mg tablet, Take 1 tablet (80 mg total) by mouth 2 (two) times a day, Disp: 180 tablet, Rfl: 3    valsartan (DIOVAN) 160 mg tablet, TAKE 1 TABLET BY MOUTH 2 TIMES A DAY , Disp: 180 tablet, Rfl: 1    venlafaxine (EFFEXOR) 37 5 mg tablet, Take 1 tablet (37 5 mg total) by mouth daily, Disp: 90 tablet, Rfl: 3    Klor-Con M10 10 MEQ tablet, TAKE 1 TABLET BY MOUTH EVERY DAY (Patient not taking: No sig reported), Disp: 30 tablet, Rfl: 5    lidocaine-prilocaine (EMLA) cream, Apply to port site 30 minutes prior to infusion and cover with a dressing (Patient not taking: Reported on 9/1/2022), Disp: 30 g, Rfl: 1    oxyCODONE (Roxicodone) 5 immediate release tablet, Take 1 tablet (5 mg total) by mouth every 6 (six) hours as needed for moderate pain or severe pain Max Daily Amount: 20 mg (Patient not taking: Reported on 9/1/2022), Disp: 30 tablet, Rfl: 0      Wt Readings from Last 3 Encounters:   09/01/22 82 6 kg (182 lb 1 6 oz)   08/22/22 82 8 kg (182 lb 8 oz)   08/09/22 83 1 kg (183 lb 3 2 oz)     Temp Readings from Last 3 Encounters:   08/22/22 97 7 °F (36 5 °C) (Temporal)   08/09/22 (!) 96 7 °F (35 9 °C)   08/08/22 97 9 °F (36 6 °C) (Temporal)     BP Readings from Last 3 Encounters:   09/01/22 118/72   08/22/22 117/76   08/09/22 110/50     Pulse Readings from Last 3 Encounters:   09/01/22 67   08/22/22 60   08/09/22 70         Physical Exam      Laboratory Studies:  CMP:  Lab Results   Component Value Date     11/23/2015    K 4 8 08/19/2022     08/19/2022    CO2 27 08/19/2022    ANIONGAP 9 11/23/2015    BUN 33 (H) 08/19/2022    CREATININE 1 59 (H) 08/19/2022    GLUCOSE 98 01/03/2022    AST 22 08/19/2022    ALT 23 08/19/2022    BILITOT 0 65 11/23/2015    EGFR 31 08/19/2022       Lipid Profile:   Lab Results   Component Value Date    CHOL 229 11/23/2015     Lab Results   Component Value Date    HDL 41 (L) 01/05/2022     Lab Results   Component Value Date    LDLCALC 119 (H) 01/05/2022     Lab Results   Component Value Date    TRIG 252 (H) 01/05/2022       Cardiac testing:   EKG reviewed personally: MILTON 67 Nml

## 2022-09-02 ENCOUNTER — APPOINTMENT (OUTPATIENT)
Dept: LAB | Facility: HOSPITAL | Age: 72
End: 2022-09-02
Payer: MEDICARE

## 2022-09-02 ENCOUNTER — TELEPHONE (OUTPATIENT)
Dept: HEMATOLOGY ONCOLOGY | Facility: CLINIC | Age: 72
End: 2022-09-02

## 2022-09-02 DIAGNOSIS — T45.1X5A ANEMIA DUE TO ANTINEOPLASTIC CHEMOTHERAPY: ICD-10-CM

## 2022-09-02 DIAGNOSIS — C34.12 MALIGNANT NEOPLASM OF UPPER LOBE OF LEFT LUNG (HCC): Primary | ICD-10-CM

## 2022-09-02 DIAGNOSIS — D64.81 ANEMIA DUE TO ANTINEOPLASTIC CHEMOTHERAPY: ICD-10-CM

## 2022-09-02 LAB
ALBUMIN SERPL BCP-MCNC: 3 G/DL (ref 3.5–5)
ALP SERPL-CCNC: 96 U/L (ref 46–116)
ALT SERPL W P-5'-P-CCNC: 20 U/L (ref 12–78)
ANION GAP SERPL CALCULATED.3IONS-SCNC: 4 MMOL/L (ref 4–13)
AST SERPL W P-5'-P-CCNC: 20 U/L (ref 5–45)
BASOPHILS # BLD AUTO: 0.03 THOUSANDS/ΜL (ref 0–0.1)
BASOPHILS NFR BLD AUTO: 1 % (ref 0–1)
BILIRUB SERPL-MCNC: 0.72 MG/DL (ref 0.2–1)
BUN SERPL-MCNC: 27 MG/DL (ref 5–25)
CALCIUM ALBUM COR SERPL-MCNC: 9.9 MG/DL (ref 8.3–10.1)
CALCIUM SERPL-MCNC: 9.1 MG/DL (ref 8.3–10.1)
CHLORIDE SERPL-SCNC: 107 MMOL/L (ref 96–108)
CO2 SERPL-SCNC: 26 MMOL/L (ref 21–32)
CREAT SERPL-MCNC: 1.65 MG/DL (ref 0.6–1.3)
EOSINOPHIL # BLD AUTO: 0.16 THOUSAND/ΜL (ref 0–0.61)
EOSINOPHIL NFR BLD AUTO: 4 % (ref 0–6)
ERYTHROCYTE [DISTWIDTH] IN BLOOD BY AUTOMATED COUNT: 15.2 % (ref 11.6–15.1)
GFR SERPL CREATININE-BSD FRML MDRD: 30 ML/MIN/1.73SQ M
GLUCOSE SERPL-MCNC: 189 MG/DL (ref 65–140)
HCT VFR BLD AUTO: 33.6 % (ref 34.8–46.1)
HGB BLD-MCNC: 10.2 G/DL (ref 11.5–15.4)
IMM GRANULOCYTES # BLD AUTO: 0.02 THOUSAND/UL (ref 0–0.2)
IMM GRANULOCYTES NFR BLD AUTO: 0 % (ref 0–2)
LYMPHOCYTES # BLD AUTO: 0.78 THOUSANDS/ΜL (ref 0.6–4.47)
LYMPHOCYTES NFR BLD AUTO: 17 % (ref 14–44)
MCH RBC QN AUTO: 26.8 PG (ref 26.8–34.3)
MCHC RBC AUTO-ENTMCNC: 30.4 G/DL (ref 31.4–37.4)
MCV RBC AUTO: 88 FL (ref 82–98)
MONOCYTES # BLD AUTO: 0.43 THOUSAND/ΜL (ref 0.17–1.22)
MONOCYTES NFR BLD AUTO: 10 % (ref 4–12)
NEUTROPHILS # BLD AUTO: 3.06 THOUSANDS/ΜL (ref 1.85–7.62)
NEUTS SEG NFR BLD AUTO: 68 % (ref 43–75)
NRBC BLD AUTO-RTO: 0 /100 WBCS
PLATELET # BLD AUTO: 241 THOUSANDS/UL (ref 149–390)
PMV BLD AUTO: 11.3 FL (ref 8.9–12.7)
POTASSIUM SERPL-SCNC: 4.5 MMOL/L (ref 3.5–5.3)
PROT SERPL-MCNC: 6.3 G/DL (ref 6.4–8.4)
RBC # BLD AUTO: 3.8 MILLION/UL (ref 3.81–5.12)
SODIUM SERPL-SCNC: 137 MMOL/L (ref 135–147)
WBC # BLD AUTO: 4.48 THOUSAND/UL (ref 4.31–10.16)

## 2022-09-02 PROCEDURE — 85025 COMPLETE CBC W/AUTO DIFF WBC: CPT

## 2022-09-02 PROCEDURE — 36415 COLL VENOUS BLD VENIPUNCTURE: CPT

## 2022-09-02 PROCEDURE — 80053 COMPREHEN METABOLIC PANEL: CPT

## 2022-09-02 NOTE — TELEPHONE ENCOUNTER
CALL TRANSFER   Reason for patient call? Bridgette Carrera calling from [facility] mobile lab regarding patients lab orders  None are in the system to be drawn today  Needs orders added   Patient's primary physician? Oneida Soler RN call was transferred to and time it was transferred? [facility] @ 9:07AM    Informed patient that the message will be forwarded to the team and someone will get back to them as soon as possible    Did you relay this information to the patient?  Yes

## 2022-09-06 ENCOUNTER — TELEPHONE (OUTPATIENT)
Dept: NEUROLOGY | Facility: CLINIC | Age: 72
End: 2022-09-06

## 2022-09-06 ENCOUNTER — HOSPITAL ENCOUNTER (OUTPATIENT)
Dept: INFUSION CENTER | Facility: CLINIC | Age: 72
Discharge: HOME | End: 2022-09-06
Payer: MEDICARE

## 2022-09-06 VITALS
DIASTOLIC BLOOD PRESSURE: 68 MMHG | HEIGHT: 64 IN | WEIGHT: 182.5 LBS | OXYGEN SATURATION: 94 % | HEART RATE: 65 BPM | TEMPERATURE: 97.4 F | BODY MASS INDEX: 31.16 KG/M2 | SYSTOLIC BLOOD PRESSURE: 148 MMHG | RESPIRATION RATE: 18 BRPM

## 2022-09-06 DIAGNOSIS — C34.92 ADENOCARCINOMA OF LEFT LUNG, STAGE 4 (HCC): ICD-10-CM

## 2022-09-06 DIAGNOSIS — R11.2 CINV (CHEMOTHERAPY-INDUCED NAUSEA AND VOMITING): ICD-10-CM

## 2022-09-06 DIAGNOSIS — E83.52 HYPERCALCEMIA: Primary | ICD-10-CM

## 2022-09-06 DIAGNOSIS — C34.12 MALIGNANT NEOPLASM OF UPPER LOBE OF LEFT LUNG (HCC): ICD-10-CM

## 2022-09-06 DIAGNOSIS — D70.1 CHEMOTHERAPY INDUCED NEUTROPENIA (HCC): ICD-10-CM

## 2022-09-06 DIAGNOSIS — T45.1X5A CHEMOTHERAPY INDUCED NEUTROPENIA (HCC): ICD-10-CM

## 2022-09-06 DIAGNOSIS — T45.1X5A CINV (CHEMOTHERAPY-INDUCED NAUSEA AND VOMITING): ICD-10-CM

## 2022-09-06 DIAGNOSIS — C41.9 MALIGNANT NEOPLASM OF BONE WITH METASTASES (HCC): ICD-10-CM

## 2022-09-06 PROCEDURE — 96409 CHEMO IV PUSH SNGL DRUG: CPT

## 2022-09-06 PROCEDURE — 96367 TX/PROPH/DG ADDL SEQ IV INF: CPT

## 2022-09-06 RX ORDER — SODIUM CHLORIDE 9 MG/ML
20 INJECTION, SOLUTION INTRAVENOUS ONCE
Status: COMPLETED | OUTPATIENT
Start: 2022-09-06 | End: 2022-09-06

## 2022-09-06 RX ADMIN — ONDANSETRON 8 MG: 2 INJECTION INTRAMUSCULAR; INTRAVENOUS at 12:19

## 2022-09-06 RX ADMIN — VINORELBINE 47 MG: 10 INJECTION, SOLUTION INTRAVENOUS at 12:50

## 2022-09-06 RX ADMIN — SODIUM CHLORIDE 20 ML/HR: 0.9 INJECTION, SOLUTION INTRAVENOUS at 12:06

## 2022-09-06 NOTE — TELEPHONE ENCOUNTER
Dial tone unable to confirm [facility]

## 2022-09-06 NOTE — PROGRESS NOTES
Patient tolerated her chemo without any adverse reactions   Next appointment confirmed and avs given

## 2022-09-06 NOTE — TELEPHONE ENCOUNTER
MSW received incoming call from patient transferred from Call center  Pt needs transportation to appt tomorrow with [facility] and is requesting [facility]  MSW informed pt that [facility] transportation is only for Oncology appts or related appts to oncology  MSW informed pt that she will reach out to [facility] to learn if her Neuro Appt is related to her Oncology Dx  MSW discussed alternative transportation option such as [address]  Pt informed that she is not interested in applying for [place] and has a friend that can drive her to appointment with Neuro tomorrow  MSW made her aware that she will still reach out to [facility]  Pt is agreeable to plan

## 2022-09-06 NOTE — TELEPHONE ENCOUNTER
MSW phoned pt informed that she is still awaiting for Sara's response however pt reported that she was able to find transportation to her med appt with [facility] tomorrow

## 2022-09-07 ENCOUNTER — OFFICE VISIT (OUTPATIENT)
Dept: NEUROLOGY | Facility: CLINIC | Age: 72
End: 2022-09-07

## 2022-09-07 VITALS
DIASTOLIC BLOOD PRESSURE: 84 MMHG | HEIGHT: 64 IN | HEART RATE: 87 BPM | SYSTOLIC BLOOD PRESSURE: 122 MMHG | WEIGHT: 185 LBS | BODY MASS INDEX: 31.58 KG/M2 | TEMPERATURE: 97.1 F | RESPIRATION RATE: 16 BRPM | OXYGEN SATURATION: 97 %

## 2022-09-07 DIAGNOSIS — Z86.73 HISTORY OF STROKE: ICD-10-CM

## 2022-09-07 DIAGNOSIS — E11.9 TYPE 2 DIABETES MELLITUS WITHOUT COMPLICATION, WITHOUT LONG-TERM CURRENT USE OF INSULIN (HCC): Primary | ICD-10-CM

## 2022-09-07 NOTE — PATIENT INSTRUCTIONS
- Continue with good blood pressure control; I would recommend monitoring at home at least 3 times per week; Goal of <130/80 - at goal  - Continue with good cholesterol control; Goal LDL <70 -not at goal; encouraged better diet and exercise  - Continue with good blood sugar control; Goal HgbA1c <7 0 - at goal; currently 6 7  - Will defer monitoring of cholesterol and blood sugar and management of hypertensive medications to the primary care provider  - Stay well hydrated by drinking enough water   - Eat a healthy diet, high in lean meats fish, turkey, chicken  Low in fats, cholesterol, sugars and sodium  Avoid canned foods,  get lets of fresh/frozen vegetables/fruits  - Get routine exercise/physical activity as much as able to tolerate  - Keep follow ups with your other health care providers  - Continue Eliquis 5 mg twice daily, Aspirin 81 mg daily and Lipitor 40 mg daily   - Fall precautions  - May return to local driving based on jammie cognitive assessment today     I will plan for her to return to the office in 6 months time but would be happy to see her sooner if the need should arise  If she has any symptoms concerning for TIA or stroke including sudden painless loss of vision or double vision, difficulty speaking or swallowing, vertigo/room spinning that does not quickly resolve, or weakness/numbness affecting 1 side of the face or body she should proceed by ambulance to the nearest emergency room immediately

## 2022-09-07 NOTE — PROGRESS NOTES
Patient ID: Karie Homans is a 74 y o  female  Assessment/Plan:    Diabetes mellitus type 2, uncomplicated (HCC)    Lab Results   Component Value Date    HGBA1C 6 7 (H) 07/23/2022       Encouraged diabetic diet low in carbohydrates and sugars  Encouraged her to eat a diet high in lean proteins and vegetables  Also encouraged physical activity as much as she can tolerate  Management and monitoring of diabetes per primary care team     History of stroke  Karie Homans is a 74year old female who is known to the practice for her hx of right basal ganglia and right caudate ischemia 1/322 while on Eliquis 5 mg twice a day for atrial fibrillation, thought to be caused by thrombosis related to chemotherapy  She continues on Eliquis 5 mg twice daily on aspirin 81 mg daily in addition to atorvastatin 40 mg daily for secondary stroke prevention  She is compliant with her medication and denies any adverse effects  She denies any residual deficits from her prior stroke and feels cognitively she is back to her prior baseline  She denies any new or worsening neurologic symptoms concerning for recurrent TIA or stroke  Stroke education was provided to patient today, stroke warning signs were reviewed as well  Plan discussed with patient as outlined below  Plan:    - Continue with good blood pressure control; I would recommend monitoring at home at least 3 times per week; Goal of <130/80 - at goal  - Continue with good cholesterol control; Goal LDL <70 -not at goal; encouraged better diet and exercise  - Continue with good blood sugar control; Goal HgbA1c <7 0 - at goal; currently 6 7  - Will defer monitoring of cholesterol and blood sugar and management of hypertensive medications to the primary care provider  - Stay well hydrated by drinking enough water   - Eat a healthy diet, high in lean meats fish, turkey, chicken  Low in fats, cholesterol, sugars and sodium   Avoid canned foods,  get lets of fresh/frozen vegetables/fruits  - Get routine exercise/physical activity as much as able to tolerate  - Keep follow ups with your other health care providers  - Continue Eliquis 5 mg twice daily, Aspirin 81 mg daily and Lipitor 40 mg daily   - Fall precautions; especially in the setting of chronic anticoagulation and anti-platelet use  We discussed should she have a fall that resulted head strike she must present to the emergency room for evaluation   - May return to local driving based on jammie cognitive assessment today     I will plan for her to return to the office in 6 months time but would be happy to see her sooner if the need should arise  If she has any symptoms concerning for TIA or stroke including sudden painless loss of vision or double vision, difficulty speaking or swallowing, vertigo/room spinning that does not quickly resolve, or weakness/numbness affecting 1 side of the face or body she should proceed by ambulance to the nearest emergency room immediately  Diagnoses and all orders for this visit:    Type 2 diabetes mellitus without complication, without long-term current use of insulin (HCC)    History of stroke       I have spent a total of 45 minutes in face-to-face time and chart review with this patient today  Subjective:    TUCKER Lobito Carranza is a 74year old female who is known to the practice for her hx of right basal ganglia and right caudate ischemia 1/322 while on Eliquis 5 mg twice a day for atrial fibrillation, thought to be caused by thrombosis related to chemotherapy  As such, ASA 81 mg was added to her regimen  She was last seen 2/14/22 by Oswaldo Saleem PA-C  She returns to the office today and states she is doing well  She continues on Eliquis 5 mg twice daily on aspirin 81 mg daily in addition to atorvastatin 40 mg daily for secondary stroke prevention    She denies any excessive bruising or bleeding and denies any previously reported vaginal bleeding or bleeding from her gums   She denies any recent dizziness or fluttering related to her atrial fibrillation  She lives with her brother who is paraplegic and is his primary caregiver  She continues to be independent of all activities of daily living  She manages her own finances and her brother sets up her pillbox but she independently remembers to take her medications  She states she is compliant with all of her medications  Her blood pressure is well controlled, today 122/84  She denies any confusion or difficulty with short-term memory, other than occasionally being unable to recall the date without looking at the calendar  She is no longer receiving any physical therapy, occupational therapy, or speech therapy  She denies any recent falls, however does occasionally still trip due to her chronic left knee pain  She does not use an assistive device, other than a walker at night time if she has to get up to use the restroom  She continues to sleep well, denies any anxiety or depression, and has had no significant headaches  She denies any residual deficits from her prior stroke and feels cognitively she is back to her prior baseline  She denies any new or worsening neurologic symptoms concerning for recurrent TIA or stroke      Lab Results   Component Value Date/Time    CHOLESTEROL 210 (H) 01/05/2022 06:46 AM     Lab Results   Component Value Date/Time    TRIG 252 (H) 01/05/2022 06:46 AM    TRIG 147 11/23/2015 06:13 AM     Lab Results   Component Value Date/Time    HDL 41 (L) 01/05/2022 06:46 AM    HDL 44 11/23/2015 06:13 AM     Lab Results   Component Value Date/Time    LDLCALC 119 (H) 01/05/2022 06:46 AM    LDLCALC 156 (H) 11/23/2015 06:13 AM       Lab Results   Component Value Date/Time    HGBA1C 6 7 (H) 07/23/2022 11:39 AM    HGBA1C 7 2 (H) 11/23/2015 06:13 AM     Lab Results   Component Value Date/Time     07/23/2022 11:39 AM     11/23/2015 06:13 AM       The following portions of the patient's history were reviewed and updated as appropriate: allergies, current medications, past family history, past medical history, past social history and past surgical history     Objective:    Blood pressure 122/84, pulse 87, temperature (!) 97 1 °F (36 2 °C), resp  rate 16, height 5' 4" (1 626 m), weight 83 9 kg (185 lb), SpO2 97 %, not currently breastfeeding  Neurological Exam    On neurological examination patient is alert, awake, oriented and in no distress  Speech is fluent without dysarthria or aphasia  Cranial nerves 2-12 were symmetrically intact bilaterally  Jamar cognitive assessment today is 27/30, compared to her prior 15/30  Her only difficulty on testing today is in delayed recall 2/5  No carotid bruits were appreciated  Her rate and rhythm are normal   No evidence of any focal weakness or sensory loss in the upper or lower extremities  Motor testing reveals 5/5 strength of the bilateral upper and lower extremities, with the exception 4/5 strength proximally and distally in the left lower extremity related to chronic knee pain  There was no pronator drift  No fasciculations present  No abnormal involuntary movements  Finger- to-nose reveals no tremor or ataxia and intact proprioceptive function, no dysmetria was noted  Sensation was intact to vibration, light touch,  and temperature in bilateral upper and lower extremities  Deep tendon reflexes were 1+ and symmetric in the bilateral upper and lower extremities  She is able to rise easily without assistance from a seated position  Casual gait is slightly antalgic secondary to chronic left knee pain  Romberg is absent  ROS:    Review of Systems   Constitutional: Negative  Negative for appetite change and fever  HENT: Negative  Negative for hearing loss, tinnitus, trouble swallowing and voice change  Eyes: Negative  Negative for photophobia and pain  Respiratory: Negative  Negative for shortness of breath  Cardiovascular: Negative  Negative for palpitations  Gastrointestinal: Negative  Negative for nausea and vomiting  Endocrine: Negative  Negative for cold intolerance  Genitourinary: Negative  Negative for dysuria, frequency and urgency  Musculoskeletal: Negative  Negative for myalgias and neck pain  Skin: Negative  Negative for rash  Neurological: Negative  Negative for dizziness, tremors, seizures, syncope, facial asymmetry, speech difficulty, weakness, light-headedness, numbness and headaches  Hematological: Bruises/bleeds easily  Patient states she takes care of her sibling and often gets bruises from that  Psychiatric/Behavioral: Negative  Negative for confusion, hallucinations and sleep disturbance  Reviewed ROS as entered by medical assistant

## 2022-09-07 NOTE — ASSESSMENT & PLAN NOTE
Teofilo Barrera is a 74year old female who is known to the practice for her hx of right basal ganglia and right caudate ischemia 1/322 while on Eliquis 5 mg twice a day for atrial fibrillation, thought to be caused by thrombosis related to chemotherapy  She continues on Eliquis 5 mg twice daily on aspirin 81 mg daily in addition to atorvastatin 40 mg daily for secondary stroke prevention  She is compliant with her medication and denies any adverse effects  She denies any residual deficits from her prior stroke and feels cognitively she is back to her prior baseline  She denies any new or worsening neurologic symptoms concerning for recurrent TIA or stroke  Stroke education was provided to patient today, stroke warning signs were reviewed as well  Plan discussed with patient as outlined below  Plan:    - Continue with good blood pressure control; I would recommend monitoring at home at least 3 times per week; Goal of <130/80 - at goal  - Continue with good cholesterol control; Goal LDL <70 -not at goal; encouraged better diet and exercise  - Continue with good blood sugar control; Goal HgbA1c <7 0 - at goal; currently 6 7  - Will defer monitoring of cholesterol and blood sugar and management of hypertensive medications to the primary care provider  - Stay well hydrated by drinking enough water   - Eat a healthy diet, high in lean meats fish, turkey, chicken  Low in fats, cholesterol, sugars and sodium  Avoid canned foods,  get lets of fresh/frozen vegetables/fruits  - Get routine exercise/physical activity as much as able to tolerate  - Keep follow ups with your other health care providers  - Continue Eliquis 5 mg twice daily, Aspirin 81 mg daily and Lipitor 40 mg daily   - Fall precautions; especially in the setting of chronic anticoagulation and anti-platelet use    We discussed should she have a fall that resulted head strike she must present to the emergency room for evaluation   - May return to local driving based on jammie cognitive assessment today     I will plan for her to return to the office in 6 months time but would be happy to see her sooner if the need should arise  If she has any symptoms concerning for TIA or stroke including sudden painless loss of vision or double vision, difficulty speaking or swallowing, vertigo/room spinning that does not quickly resolve, or weakness/numbness affecting 1 side of the face or body she should proceed by ambulance to the nearest emergency room immediately

## 2022-09-07 NOTE — ASSESSMENT & PLAN NOTE
Lab Results   Component Value Date    HGBA1C 6 7 (H) 07/23/2022       Encouraged diabetic diet low in carbohydrates and sugars  Encouraged her to eat a diet high in lean proteins and vegetables  Also encouraged physical activity as much as she can tolerate    Management and monitoring of diabetes per primary care team

## 2022-09-07 NOTE — TELEPHONE ENCOUNTER
Pt has transportation to medical appointment and denied assistance with getting connected with [place] transportation to medical appointments  MSW remains available for any questions and or future social needs

## 2022-09-08 ENCOUNTER — DOCUMENTATION (OUTPATIENT)
Dept: HEMATOLOGY ONCOLOGY | Facility: CLINIC | Age: 72
End: 2022-09-08

## 2022-09-08 ENCOUNTER — TELEPHONE (OUTPATIENT)
Dept: HEMATOLOGY ONCOLOGY | Facility: CLINIC | Age: 72
End: 2022-09-08

## 2022-09-08 ENCOUNTER — OFFICE VISIT (OUTPATIENT)
Dept: HEMATOLOGY ONCOLOGY | Facility: CLINIC | Age: 72
End: 2022-09-08
Payer: MEDICARE

## 2022-09-08 VITALS
WEIGHT: 185 LBS | RESPIRATION RATE: 17 BRPM | TEMPERATURE: 97.6 F | DIASTOLIC BLOOD PRESSURE: 76 MMHG | HEART RATE: 70 BPM | HEIGHT: 64 IN | OXYGEN SATURATION: 96 % | SYSTOLIC BLOOD PRESSURE: 136 MMHG | BODY MASS INDEX: 31.58 KG/M2

## 2022-09-08 DIAGNOSIS — T45.1X5A NEUROPATHY DUE TO CHEMOTHERAPEUTIC DRUG (HCC): ICD-10-CM

## 2022-09-08 DIAGNOSIS — G62.0 NEUROPATHY DUE TO CHEMOTHERAPEUTIC DRUG (HCC): ICD-10-CM

## 2022-09-08 DIAGNOSIS — C34.12 MALIGNANT NEOPLASM OF UPPER LOBE OF LEFT LUNG (HCC): Primary | ICD-10-CM

## 2022-09-08 PROCEDURE — 99215 OFFICE O/P EST HI 40 MIN: CPT | Performed by: PHYSICIAN ASSISTANT

## 2022-09-08 NOTE — PROGRESS NOTES
Hematology/Oncology Outpatient Follow- up Note  Jenna Griffith 74 y o  female MRN: @ Encounter: [id number]        Date:  9/8/2022      Assessment / Plan:    Stage 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 of the lung with left scapula involvement diagnosed on 08/2016 PDL expression more than 50%, negative for EGFR mutation, ALK  rearrangement, Ros1 mutation     Treated initially with Pembrolizumab complicated with pneumonitis and later on nivolumab with prednisone 10 mg p o  Daily with excellent response      2   Disease progression in August 2018 in the left scapula with pain no new lesions by PET scan   Status post radiation therapy to the left scapula and treated with Alimta 500 milligram/meter squared, carboplatin AUC 5     After 3 cycles,  CT scan in January 2019 showed stable disease, and she was placed on maintenance Alimta 500 milligram/meter squared every 3 weeks         Alimta dose was reduced to 400 milligram/meter IV due to elevated Creatinine and  then Pemetrexed dose was reduced to 300 milligram/meter squared every 4 weeks    Cycle #38  Alimta was 6/22/21          3   Progression of disease 7/2021         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%   (MAP2K1 mutations are mutually exclusive with BRAF mutations)  There are FDA approved therapies indicated for other disease states such as binimetinib, cobimetinib, selumetinib, trametinib   Given the very small (0 1%) DNA amplification, use of these medications off label are likely to offer minimal benefit        Nivolumab 240 mg flat dose every 3 weeks and  carboplatin AUC 5 added to  pemetrexed which was dose reduced to 250 milligram/meter squared, initiated 8/2/2021     4  Progression of disease  Treatment changed to Taxotere 75mg/m2 10/2021    Cyramza added with cycle 2 11/2021      5   Right basal ganglia and right ischemic CVA on 01/2022 with hospitalization and rehab which may have been secondary to Cyramza     6  Therapy changed to Navelbine 30 mg per m2 every other week since 02/02/2022         7  Chemo induced anemia  Hemoglobin 9 9 6/20/22 compared to 12 3 4/2022;  10 6 5/2022      8  CKD 1  4 - 1 6     9  Atrial fibrillation/ history of CVA on Eliquis     10  Progression of disease  8/30/22- CT chest - Enlarged medial right lower lobe spiculated opacity now measuring 3 8 x 1 9 cm previously measured 2 1 x 1 2 suspicious for malignancy  D/W Dr Poornima Chambers  This appears to be in field where she had RT to T9-T11 in 2016  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, recommended treatment plan is to change treatment to Lumakrans (sotorasib)  Discuss this possibility at her office visit today  Recommended starting dose is 960 milligrams per day  This comes in 120mg tablets, therefore 8/day  Advised to not take this directly with her PPI  This will be coming from a specialty pharmacy  Potential side effects could include but may not be limited to: Hepatotoxicity and interstitial lung disease/pneumonitis, diarrhea, musculoskeletal pain, nausea, fatigue, hepatotoxicity, cough, cytopenias, abnormal LFTS, hypocalcemia, hyponatremia, increased urinary protein  Talked with patient on the phone that given systemic options and potential overlap and radiation, we will defer that option at this time  Recommend proceeding with sotorasib  Verbal consent obtained  We discussed checking labs 2 weeks and 4 weeks after initiation of treatment  F/u in 4 weeks  She asked to call should she have any issues or concerns  Plan discussed with and approved by Dr Zhanna Fernández  HPI:   Tatyana Kathleen was admitted to the hospital with arrhythmia and was found to have right lower lobe infiltrate in August 2016   She was treated with antibiotics however repeat chest x-ray showed persistent right lower lobe infiltrate   Subsequently the patient had a CT scan of the chest which showed a right perihilar mass, subcarinal lymphadenopathy, lytic lesion of the right costovertebral junction at T10 level   PET scan October 2016 showed a right perihilar mass measuring 3 5 cm with SUV of 8 9, subcarinal lymph nodes measuring 3 4 x 2 2 cm with SUV of 9 2  Nodule in the left upper lobe lung measured 2 x 1 1 cm   There was a lytic lesion involving the right 10th costovertebral junction with SUV of 14  4      Biopsy showed non-small cell carcinoma with features suggesting of adenocarcinoma positive for CK 7, CK 19, CA-19-9, ANEUDY-3, partially positive for P40, p63, negative for TTF-1   She had a history of uterine cancer in 2000 status post hysterectomy and did not require radiation or chemotherapy   She is status post bilateral oophorectomy, right knee replacement,   tonsillectomy       She used to smoke for 35 years 1 pack per day however quit smoking 21 years ago   She used hormonal replacement therapy for 3 years  Nahomy Medina has a family history significant for skin cancer in her father and coronary artery disease in mother  Nahomy Medina has 2 healthy children      Treated initially with Pembrolizumab December 2016, Finished in May 2017 secondary to grade 3 pneumonitis  Initiated on prednisone     Progression: Nivolumab 240 mg flat dose every 2 weeks along with prednisone 10 mg p o  Daily initiated April 2018- October 2018 with excellent response      Liquid biopsy showed K-MADHURI mutation G12C, no evidence of MSI high        Disease progression in August 2018 in the left scapula with pain no new lesions by PET scan   Status post radiation therapy to the left scapula and treated with Alimta 500 milligram/meter squared, carboplatin AUC 5   After 3 cycles,  CT scan in January 2019 showed stable disease   Carbo discontinued 3/2019    Maintenance Alimta 500 milligram/meter squared every 3 weeks initiated 3/2019        Cr 2/20 was 1 5   Plan was to dose reduce Alimta to 400mg/m2; however cr 2 16 2/24/20 and Alimta was held       3/4/20:  renal u/s  Minimal fullness of the left renal collecting system without matthieu hydronephrosis  Chronic right kidney lower pole cortical scar, with adjacent parenchymal calcification measuring 5 mm      She was on prednisone 5 mg p o  daily because of previous history of pneumonitis  CT scan chest 1/3/2020 showed no evidence of infiltration in the lung parenchyma, prednisone was reduced every other day for 1 month and then discontinued        Progression of disease 7/2021        Nivolumab 240 mg flat dose every 3 weeks and  carboplatin AUC 5 added to pemetrexed which was dose reduced to 250 milligram/meter squared, initiated 8/2/2021     Progression of disease  Treatment changed to Taxotere 75mg/m2 10/2021  Cyramza added with cycle 2 11/2021      Right basal ganglia and right ischemic CVA on 01/2022 with hospitalization and rehab which may have been secondary to [address] changed to Navelbine 30 mg per m2 every other week since 02/02/2022  Interval History:    8/30/22- CT chest - Enlarged medial right lower lobe spiculated opacity now measuring 3 8 x 1 9 cm previously measured 2 1 x 1 2 suspicious for malignancy  Test Results:        Labs:   Lab Results   Component Value Date    HGB 10 2 (L) 09/02/2022    HCT 33 6 (L) 09/02/2022    MCV 88 09/02/2022     09/02/2022    WBC 4 48 09/02/2022    NRBC 0 09/02/2022     Lab Results   Component Value Date     11/23/2015    K 4 5 09/02/2022     09/02/2022    CO2 26 09/02/2022    ANIONGAP 9 11/23/2015    BUN 27 (H) 09/02/2022    CREATININE 1 65 (H) 09/02/2022    GLUCOSE 98 01/03/2022    GLUF 172 (H) 07/23/2022    CALCIUM 9 1 09/02/2022    CORRECTEDCA 9 9 09/02/2022    AST 20 09/02/2022    ALT 20 09/02/2022    ALKPHOS 96 09/02/2022    PROT 6 8 11/23/2015    BILITOT 0 65 11/23/2015    EGFR 30 09/02/2022       Imaging: CT chest wo contrast    Result Date: 9/2/2022  Narrative: CT CHEST WITHOUT IV CONTRAST INDICATION:   C34 12:  Malignant neoplasm of upper lobe, left bronchus or lung  COMPARISON:  CT chest 4/18/2022  CT chest abdomen pelvis 3/1/2022  TECHNIQUE: CT examination of the chest was performed without intravenous contrast  Axial, sagittal, and coronal 2D reformatted images were created from the source data and submitted for interpretation  Radiation dose length product (DLP) for this visit:  288 mGy-cm   This examination, like all CT scans performed in the [facility], was performed utilizing techniques to minimize radiation dose exposure, including the use of iterative reconstruction and automated exposure control  FINDINGS: LUNGS:  Unchanged left upper lung radiation fibrosis  Enlarged medial right lower lobe spiculated opacity now measuring 3 8 x 1 9 cm, #3/100, previously measured 2 1 x 1 2 cm  Previously seen 6 mm lingular nodule is no longer identified  No endotracheal or endobronchial lesion  PLEURA:  Unremarkable  HEART/GREAT VESSELS: Heart is unremarkable for patient's age  No thoracic aortic aneurysm  MEDIASTINUM AND ANABEL:  Small hiatal hernia  CHEST WALL AND LOWER NECK:  Unremarkable  VISUALIZED STRUCTURES IN THE UPPER ABDOMEN:  Unchanged left hepatic cyst   Likely unchanged 2 6 cm pancreatic body cyst   Other pancreatic cysts or not well visualized on today's noncontrast imaging  OSSEOUS STRUCTURES:  No acute fracture or destructive osseous lesion  Impression: Enlarged medial right lower lobe spiculated opacity now measuring 3 8 x 1 9 cm previously measured 2 1 x 1 2 suspicious for malignancy  Unchanged left upper lobe radiation fibrosis  The study was marked in [facility] for immediate notification  Workstation performed: [id number]           ROS:  As mentioned in HPI & Interval History otherwise 14 point ROS negative  Allergies:    Allergies   Allergen Reactions    Amoxicillin Hives    Amoxicillin Rash and Hives    Cardizem [Diltiazem] Rash     Rash      Statins Myalgia     Severe muscle aching  Terrible pains  Zofran [Ondansetron] Palpitations     Current Medications: Reviewed  PMH/FH/SH:  Reviewed      Physical Exam:    There is no height or weight on file to calculate BSA  Ht Readings from Last 3 Encounters:   09/07/22 5' 4" (1 626 m)   09/06/22 5' 4 02" (1 626 m)   09/01/22 5' 4" (1 626 m)        Wt Readings from Last 3 Encounters:   09/07/22 83 9 kg (185 lb)   09/06/22 82 8 kg (182 lb 8 oz)   09/01/22 82 6 kg (182 lb 1 6 oz)        Temp Readings from Last 3 Encounters:   09/07/22 (!) 97 1 °F (36 2 °C)   09/06/22 (!) 97 4 °F (36 3 °C) (Temporal)   08/22/22 97 7 °F (36 5 °C) (Temporal)        BP Readings from Last 3 Encounters:   09/07/22 122/84   09/06/22 148/68   09/01/22 118/72           Physical Exam  Vitals reviewed  Constitutional:       General: She is not in acute distress  Appearance: She is well-developed  She is not diaphoretic  HENT:      Head: Normocephalic and atraumatic  Eyes:      Conjunctiva/sclera: Conjunctivae normal    Neck:      Trachea: No tracheal deviation  Cardiovascular:      Rate and Rhythm: Normal rate and regular rhythm  Heart sounds: No murmur heard  No friction rub  No gallop  Pulmonary:      Effort: Pulmonary effort is normal  No respiratory distress  Breath sounds: Normal breath sounds  No wheezing or rales  Chest:      Chest wall: No tenderness  Abdominal:      General: There is no distension  Palpations: Abdomen is soft  Tenderness: There is no abdominal tenderness  Musculoskeletal:      Cervical back: Normal range of motion and neck supple  Lymphadenopathy:      Cervical: No cervical adenopathy  Skin:     General: Skin is warm and dry  Coloration: Skin is not pale  Findings: No erythema  Neurological:      Mental Status: She is alert and oriented to person, place, and time  Psychiatric:         Behavior: Behavior normal          Thought Content:  Thought content normal          Judgment: Judgment normal          ECOG: 1      Emergency Contacts:    Extended Emergency Contact Information  Primary Emergency Contact: Patel Reinoso  Address: [address] [address] [place]  Mobile Phone: [phone]  Relation: Brother  Secondary Emergency Contact: Liz Booth  Address: [facility] [facility] Mariah Acevedo Phone: [phone]  Relation: Daughter

## 2022-09-09 ENCOUNTER — DOCUMENTATION (OUTPATIENT)
Dept: HEMATOLOGY ONCOLOGY | Facility: CLINIC | Age: 72
End: 2022-09-09

## 2022-09-09 ENCOUNTER — TELEPHONE (OUTPATIENT)
Dept: HEMATOLOGY ONCOLOGY | Facility: CLINIC | Age: 72
End: 2022-09-09

## 2022-09-09 NOTE — PROGRESS NOTES
9-8-22  Rcvd new oral chemo start Lumakras    9-9-22  Auth is required per [facility]  Funding through foundations is unavailable at this time  Completed [facility] PAP free drug application and forwarded to provider for signature  Once copay is received I will contact patient to discuss assistance  [facility] [id number]  40  Call to patient to discuss free drug program due to no foundations offering assistance  Patient stated she was grateful for the assistance and would get the information together and fax to me  9-12-22  Provider/patient portions of application rcvd   Application faxed to gen

## 2022-09-09 NOTE — PROGRESS NOTES
Received request from clinical for patient to start on Lumakras 960mg  Dora Lou has been submitted via cover my meds through [facility]  Auth is pending  (Key: [id number])     BIN:       [id number]  PCN:      Leandro Celaya  ID:          [id number]  GRP:      [id number]    UPDATE:  This has been approved from insurance 9/9/2022-9/9/2023  High co pay of $ 1688 90 so this will go through free drug

## 2022-09-09 NOTE — TELEPHONE ENCOUNTER
Medication Refill     Who is Calling self   Medication lumakras - sotorasib 120mg   How many pills left none   Preferred Pharmacy / Address [facility]   Who is your Physician?  [place]   Call back number [phone]   Relevant Information Needs new script, insurance approved

## 2022-09-09 NOTE — TELEPHONE ENCOUNTER
Notified pt of process and requiring appropriate specialty pharmacy for filling  Notified patient once we our financial team notifies us of filling pharmacy we will send the script off  She was appreciative of this

## 2022-09-15 ENCOUNTER — TELEPHONE (OUTPATIENT)
Dept: LAB | Facility: HOSPITAL | Age: 72
End: 2022-09-15

## 2022-09-19 ENCOUNTER — HOSPITAL ENCOUNTER (OUTPATIENT)
Dept: INFUSION CENTER | Facility: CLINIC | Age: 72
End: 2022-09-19

## 2022-09-21 DIAGNOSIS — R60.0 BILATERAL LEG EDEMA: ICD-10-CM

## 2022-09-21 RX ORDER — POTASSIUM CHLORIDE 750 MG/1
10 TABLET, EXTENDED RELEASE ORAL DAILY
Qty: 90 TABLET | Refills: 3 | Status: SHIPPED | OUTPATIENT
Start: 2022-09-21

## 2022-09-29 ENCOUNTER — TELEPHONE (OUTPATIENT)
Dept: HEMATOLOGY ONCOLOGY | Facility: CLINIC | Age: 72
End: 2022-09-29

## 2022-09-29 DIAGNOSIS — C34.12 MALIGNANT NEOPLASM OF UPPER LOBE OF LEFT LUNG (HCC): ICD-10-CM

## 2022-09-29 NOTE — TELEPHONE ENCOUNTER
Spoke with patient reviewed filling pharmacy of [facility]  Provided her with their phone number  Advised her to call and set up shipment if she doesn't receive a call from them within the next 48 hours  Pt was also provided my direct line, advised her to call with any issues filling the medication or side effects noted  She verbalized understanding of the above information she was appreciative of this

## 2022-09-29 NOTE — TELEPHONE ENCOUNTER
Original fax number showed no response, unable to send  Was provided additional fax# [phone], by Karl Walker  Fax sent  Scanned under media

## 2022-09-29 NOTE — TELEPHONE ENCOUNTER
Spoke with patient who had question in regards to frequency of her labs  Per Favio Richey PA-C note  "We discussed checking labs 2 weeks and 4 weeks after initiation of treatment  F/u in 4 weeks " Pt verbalized understanding of frequency

## 2022-09-29 NOTE — TELEPHONE ENCOUNTER
"Good Morning,    Patient Celio Trinh [birth year] [address] voicemail message from Nataliia Black stating patient has been approved for the free drug program through [employer] PAP  Eff 9-27-22 thru 12-31-22    Kathy/Neema:   [address] [facility] will fill medication  Please forward script with refills to them  Adalid is also requesting paper script be faxed to them as well  Fax # [phone]    Once script is received they will contact patient immediately to set up shipment  Will contact patient to inform of approval and advise if she does not hear from pharmacy within 72 hrs she should call providers office  Thanks  [employer] pended for signature and faxed to number provided above

## 2022-10-03 ENCOUNTER — TELEPHONE (OUTPATIENT)
Dept: CARDIOLOGY CLINIC | Facility: CLINIC | Age: 72
End: 2022-10-03

## 2022-10-03 ENCOUNTER — APPOINTMENT (OUTPATIENT)
Dept: LAB | Facility: AMBULARY SURGERY CENTER | Age: 72
End: 2022-10-03
Payer: MEDICARE

## 2022-10-03 DIAGNOSIS — K21.9 GERD WITHOUT ESOPHAGITIS: ICD-10-CM

## 2022-10-03 LAB
ALBUMIN SERPL BCP-MCNC: 3.1 G/DL (ref 3.5–5)
ALP SERPL-CCNC: 103 U/L (ref 46–116)
ALT SERPL W P-5'-P-CCNC: 20 U/L (ref 12–78)
ANION GAP SERPL CALCULATED.3IONS-SCNC: 4 MMOL/L (ref 4–13)
AST SERPL W P-5'-P-CCNC: 20 U/L (ref 5–45)
BASOPHILS # BLD AUTO: 0.06 THOUSANDS/ΜL (ref 0–0.1)
BASOPHILS NFR BLD AUTO: 1 % (ref 0–1)
BILIRUB SERPL-MCNC: 0.67 MG/DL (ref 0.2–1)
BUN SERPL-MCNC: 30 MG/DL (ref 5–25)
CALCIUM ALBUM COR SERPL-MCNC: 10.5 MG/DL (ref 8.3–10.1)
CALCIUM SERPL-MCNC: 9.8 MG/DL (ref 8.3–10.1)
CHLORIDE SERPL-SCNC: 109 MMOL/L (ref 96–108)
CO2 SERPL-SCNC: 25 MMOL/L (ref 21–32)
CREAT SERPL-MCNC: 1.75 MG/DL (ref 0.6–1.3)
EOSINOPHIL # BLD AUTO: 0.13 THOUSAND/ΜL (ref 0–0.61)
EOSINOPHIL NFR BLD AUTO: 2 % (ref 0–6)
ERYTHROCYTE [DISTWIDTH] IN BLOOD BY AUTOMATED COUNT: 16 % (ref 11.6–15.1)
GFR SERPL CREATININE-BSD FRML MDRD: 28 ML/MIN/1.73SQ M
GLUCOSE SERPL-MCNC: 132 MG/DL (ref 65–140)
HCT VFR BLD AUTO: 36.3 % (ref 34.8–46.1)
HGB BLD-MCNC: 11 G/DL (ref 11.5–15.4)
IMM GRANULOCYTES # BLD AUTO: 0.03 THOUSAND/UL (ref 0–0.2)
IMM GRANULOCYTES NFR BLD AUTO: 0 % (ref 0–2)
LYMPHOCYTES # BLD AUTO: 0.99 THOUSANDS/ΜL (ref 0.6–4.47)
LYMPHOCYTES NFR BLD AUTO: 12 % (ref 14–44)
MCH RBC QN AUTO: 26.4 PG (ref 26.8–34.3)
MCHC RBC AUTO-ENTMCNC: 30.3 G/DL (ref 31.4–37.4)
MCV RBC AUTO: 87 FL (ref 82–98)
MONOCYTES # BLD AUTO: 0.41 THOUSAND/ΜL (ref 0.17–1.22)
MONOCYTES NFR BLD AUTO: 5 % (ref 4–12)
NEUTROPHILS # BLD AUTO: 6.46 THOUSANDS/ΜL (ref 1.85–7.62)
NEUTS SEG NFR BLD AUTO: 80 % (ref 43–75)
NRBC BLD AUTO-RTO: 0 /100 WBCS
PLATELET # BLD AUTO: 236 THOUSANDS/UL (ref 149–390)
PMV BLD AUTO: 12.4 FL (ref 8.9–12.7)
POTASSIUM SERPL-SCNC: 4.9 MMOL/L (ref 3.5–5.3)
PROT SERPL-MCNC: 6.7 G/DL (ref 6.4–8.4)
RBC # BLD AUTO: 4.17 MILLION/UL (ref 3.81–5.12)
SODIUM SERPL-SCNC: 138 MMOL/L (ref 135–147)
WBC # BLD AUTO: 8.08 THOUSAND/UL (ref 4.31–10.16)

## 2022-10-03 PROCEDURE — 80053 COMPREHEN METABOLIC PANEL: CPT | Performed by: PHYSICIAN ASSISTANT

## 2022-10-03 PROCEDURE — 85025 COMPLETE CBC W/AUTO DIFF WBC: CPT | Performed by: PHYSICIAN ASSISTANT

## 2022-10-03 PROCEDURE — 36415 COLL VENOUS BLD VENIPUNCTURE: CPT | Performed by: PHYSICIAN ASSISTANT

## 2022-10-03 RX ORDER — OMEPRAZOLE 20 MG/1
20 CAPSULE, DELAYED RELEASE ORAL DAILY
Qty: 90 CAPSULE | Refills: 3 | Status: SHIPPED | OUTPATIENT
Start: 2022-10-03

## 2022-10-03 NOTE — TELEPHONE ENCOUNTER
P/c'd , states she is starting Sotorasib and the oncologist said it will make the Eliquis not as effective  She will be getting CMP and CBc with Diff q2 weeks  She wanted to let you know  Any changes?     Please advise

## 2022-10-04 ENCOUNTER — TELEPHONE (OUTPATIENT)
Dept: PALLIATIVE MEDICINE | Facility: CLINIC | Age: 72
End: 2022-10-04

## 2022-10-04 NOTE — TELEPHONE ENCOUNTER
Patient called and states she was notified by her mail  order pharmacist that her Buspar may not be as effective since Dr Neal Quiñones has started her on Lumakras/Sotorasib  If needed the mail order number is [phone]

## 2022-10-04 NOTE — TELEPHONE ENCOUNTER
She should let us know if there are mood changes so that we could consider changing dosing, in the coming weeks/months

## 2022-10-12 PROBLEM — Z00.00 MEDICARE ANNUAL WELLNESS VISIT, SUBSEQUENT: Status: RESOLVED | Noted: 2020-12-10 | Resolved: 2022-10-12

## 2022-10-13 ENCOUNTER — OFFICE VISIT (OUTPATIENT)
Dept: HEMATOLOGY ONCOLOGY | Facility: CLINIC | Age: 72
End: 2022-10-13
Payer: MEDICARE

## 2022-10-13 VITALS
BODY MASS INDEX: 31.86 KG/M2 | TEMPERATURE: 97 F | RESPIRATION RATE: 16 BRPM | OXYGEN SATURATION: 95 % | WEIGHT: 186.6 LBS | DIASTOLIC BLOOD PRESSURE: 80 MMHG | HEIGHT: 64 IN | HEART RATE: 71 BPM | SYSTOLIC BLOOD PRESSURE: 130 MMHG

## 2022-10-13 DIAGNOSIS — F41.1 GENERALIZED ANXIETY DISORDER: ICD-10-CM

## 2022-10-13 DIAGNOSIS — C34.91 MALIGNANT NEOPLASM OF UNSPECIFIED PART OF RIGHT BRONCHUS OR LUNG (HCC): Primary | ICD-10-CM

## 2022-10-13 DIAGNOSIS — C34.90 ADENOCARCINOMA OF LUNG, STAGE 4, UNSPECIFIED LATERALITY (HCC): ICD-10-CM

## 2022-10-13 DIAGNOSIS — C79.52 SECONDARY MALIGNANT NEOPLASM OF BONE AND BONE MARROW (HCC): ICD-10-CM

## 2022-10-13 DIAGNOSIS — Z51.5 PALLIATIVE CARE PATIENT: ICD-10-CM

## 2022-10-13 DIAGNOSIS — C79.51 SECONDARY MALIGNANT NEOPLASM OF BONE AND BONE MARROW (HCC): ICD-10-CM

## 2022-10-13 DIAGNOSIS — F32.5 MAJOR DEPRESSIVE DISORDER WITH SINGLE EPISODE, IN FULL REMISSION (HCC): ICD-10-CM

## 2022-10-13 DIAGNOSIS — F41.9 ANXIETY: ICD-10-CM

## 2022-10-13 PROCEDURE — 99214 OFFICE O/P EST MOD 30 MIN: CPT | Performed by: INTERNAL MEDICINE

## 2022-10-13 RX ORDER — BUSPIRONE HYDROCHLORIDE 7.5 MG/1
7.5 TABLET ORAL 2 TIMES DAILY
Qty: 180 TABLET | Refills: 0 | Status: SHIPPED | OUTPATIENT
Start: 2022-10-13

## 2022-10-13 NOTE — TELEPHONE ENCOUNTER
Primary palliative medicine provider:   Rey Maria    Medication requested:  Buspirone     If for pain, how has the patient been taking their pain medicine?      Last appointment: 5/5    Next scheduled appointment:  11/14    PDMP review:

## 2022-10-13 NOTE — PROGRESS NOTES
Hematology Outpatient Follow - Up Note  Leslee Griffith 74 y o  female MRN: @ Encounter: [id number]        Date:  10/13/2022        Assessment/ Plan:    Stage 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 of the lung with left scapula involvement diagnosed on 08/2016 PDL expression more than 50%, negative for EGFR mutation, ALK  rearrangement, Ros1 mutation     Treated initially with Pembrolizumab complicated with pneumonitis and later on nivolumab with prednisone 10 mg p o   Daily with excellent response      2   Disease progression in August 2018 in the left scapula with pain no new lesions by PET scan   Status post radiation therapy to the left scapula and treated with Alimta 500 milligram/meter squared, carboplatin AUC 5     After 3 cycles,  CT scan in January 2019 showed stable disease, and she was placed on maintenance Alimta 500 milligram/meter squared every 3 weeks         Alimta dose was reduced to 400 milligram/meter IV due to elevated Creatinine and  then Pemetrexed dose was reduced to 300 milligram/meter squared every 4 weeks    Cycle #38  Alimta was 6/22/21          3   Progression of disease 7/2021         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%   (MAP2K1 mutations are mutually exclusive with BRAF mutations)  There are FDA approved therapies indicated for other disease states such as binimetinib, cobimetinib, selumetinib, trametinib   Given the very small (0 1%) DNA amplification, use of these medications off label are likely to offer minimal benefit        Nivolumab 240 mg flat dose every 3 weeks and  carboplatin AUC 5 added to  pemetrexed which was dose reduced to 250 milligram/meter squared, initiated 8/2/2021     4   Progression of disease   Treatment changed to Taxotere 75mg/m2 10/2021  Clearnce Curia added with cycle 2 11/2021      5   Right basal ganglia and right ischemic CVA on 01/2022 with hospitalization and rehab which may have been secondary to Cyramza     6   Therapy changed to Navelbine 30 mg per m2 every other week since 02/02/2022         7  Chemo induced anemia   Hemoglobin 9 9 6/20/22 compared to 12 3 4/2022;  10 6 5/2022      8  CKD 1  4 - 1 6     9  Atrial fibrillation/ history of CVA on Eliquis     10  Progression of disease  8/30/22- CT chest - Enlarged medial right lower lobe spiculated opacity now measuring 3 8 x 1 9 cm previously measured 2 1 x 1 2 suspicious for malignancy  D/W Dr Dhara Alcaraz  This appears to be in field where she had RT to T9-T11 in 2016      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, recommended treatment plan is to change treatment to Lumakrans (sotorasib)  She elected to wait and observe, we will repeat CT scan of the chest in November 2022, CBC, CMP     TSH for hypothyroidism induced by immunotherapy        Labs and imaging studies are reviewed by ordering provider once results are available  If there are findings that need immediate attention, you will be contacted when results available  Discussing results and the implication on your healthcare is best discussed in person at your follow-up visit  HPI:    Ha Gonzalez was admitted to the hospital with arrhythmia and was found to have right lower lobe infiltrate in August 2016   She was treated with antibiotics however repeat chest x-ray showed persistent right lower lobe infiltrate  Subsequently the patient had a CT scan of the chest which showed a right perihilar mass, subcarinal lymphadenopathy, lytic lesion of the right costovertebral junction at T10 level   PET scan October 2016 showed a right perihilar mass measuring 3 5 cm with SUV of 8 9, subcarinal lymph nodes measuring 3 4 x 2 2 cm with SUV of 9 2  Nodule in the left upper lobe lung measured 2 x 1 1 cm   There was a lytic lesion involving the right 10th costovertebral junction with SUV of 14  4      Biopsy showed non-small cell carcinoma with features suggesting of adenocarcinoma positive for CK 7, CK 19, CA-19-9, ANEUDY-3, partially positive for P40, p63, negative for TTF-1   She had a history of uterine cancer in 2000 status post hysterectomy and did not require radiation or chemotherapy   She is status post bilateral oophorectomy, right knee replacement,   tonsillectomy       She used to smoke for 35 years 1 pack per day however quit smoking 21 years ago   She used hormonal replacement therapy for 3 years  Aydin Mcclure has a family history significant for skin cancer in her father and coronary artery disease in mother  Aydin Mcclure has 2 healthy children      Treated initially with Pembrolizumab December 2016, Finished in May 2017 secondary to grade 3 pneumonitis  Initiated on prednisone     Progression: Nivolumab 240 mg flat dose every 2 weeks along with prednisone 10 mg p o  Daily initiated April 2018- October 2018 with excellent response      Liquid biopsy showed K-MADHURI mutation G12C, no evidence of MSI high        Disease progression in August 2018 in the left scapula with pain no new lesions by PET scan   Status post radiation therapy to the left scapula and treated with Alimta 500 milligram/meter squared, carboplatin AUC 5   After 3 cycles,  CT scan in January 2019 showed stable disease  Carbo discontinued 3/2019    Maintenance Alimta 500 milligram/meter squared every 3 weeks initiated 3/2019        Cr 2/20 was 1 5   Plan was to dose reduce Alimta to 400mg/m2; however cr 2 16 2/24/20 and Alimta was held       3/4/20:  renal u/s  Minimal fullness of the left renal collecting system without matthieu hydronephrosis     Chronic right kidney lower pole cortical scar, with adjacent parenchymal calcification measuring 5 mm      She was on prednisone 5 mg p o  daily because of previous history of pneumonitis  CT scan chest 1/3/2020 showed no evidence of infiltration in the lung parenchyma, prednisone was reduced every other day for 1 month and then discontinued        Progression of disease 7/2021        Nivolumab 240 mg flat dose every 3 weeks and  carboplatin AUC 5 added to pemetrexed which was dose reduced to 250 milligram/meter squared, initiated 8/2/2021     Progression of disease   Treatment changed to Taxotere 75mg/m2 10/2021  Richie Lama added with cycle 2 11/2021      Right basal ganglia and right ischemic CVA on 01/2022 with hospitalization and rehab which may have been secondary to [address] changed to Navelbine 30 mg per m2 every other week since 02/02/2022 8/30/22- CT chest - Enlarged medial right lower lobe spiculated opacity now measuring 3 8 x 1 9 cm previously measured 2 1 x 1 2 suspicious for malignancy  Elected not to have radiation therapy or sotorasib given her 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     Interval History:        Previous Treatment:         Test Results:    Imaging: No results found  Labs:   Lab Results   Component Value Date    WBC 8 08 10/03/2022    HGB 11 0 (L) 10/03/2022    HCT 36 3 10/03/2022    MCV 87 10/03/2022     10/03/2022     Lab Results   Component Value Date     11/23/2015    K 4 9 10/03/2022     (H) 10/03/2022    CO2 25 10/03/2022    ANIONGAP 9 11/23/2015    BUN 30 (H) 10/03/2022    CREATININE 1 75 (H) 10/03/2022    GLUCOSE 98 01/03/2022    GLUF 172 (H) 07/23/2022    CALCIUM 9 8 10/03/2022    CORRECTEDCA 10 5 (H) 10/03/2022    AST 20 10/03/2022    ALT 20 10/03/2022    ALKPHOS 103 10/03/2022    PROT 6 8 11/23/2015    BILITOT 0 65 11/23/2015    EGFR 28 10/03/2022       Lab Results   Component Value Date    IRON 79 02/16/2022    TIBC 320 02/16/2022    FERRITIN 49 02/16/2022       No results found for: [id number]      ROS: Review of Systems   Constitutional: Negative for appetite change, chills, diaphoresis, fatigue and unexpected weight change  HENT:   Negative for mouth sores, nosebleeds, sore throat, trouble swallowing and voice change  Eyes: Negative for eye problems and icterus     Respiratory: Negative for chest tightness, cough, hemoptysis and wheezing  Cardiovascular: Negative for chest pain, leg swelling and palpitations  Gastrointestinal: Negative for abdominal distention, abdominal pain, blood in stool, constipation, diarrhea, nausea and vomiting  Endocrine: Negative for hot flashes  Genitourinary: Negative for bladder incontinence, difficulty urinating, dyspareunia, dysuria and frequency  Musculoskeletal: Negative for arthralgias, back pain, gait problem, neck pain and neck stiffness  Skin: Negative for itching and rash  Neurological: Negative for dizziness, gait problem, headaches, numbness, seizures and speech difficulty  Hematological: Negative for adenopathy  Does not bruise/bleed easily  Psychiatric/Behavioral: Negative for decreased concentration, depression, sleep disturbance and suicidal ideas  The patient is not nervous/anxious  Current Medications: Reviewed  Allergies: Reviewed  PMH/FH/SH:  Reviewed      Physical Exam:    Body surface area is 1 9 meters squared  Wt Readings from Last 3 Encounters:   10/13/22 84 6 kg (186 lb 9 6 oz)   09/08/22 83 9 kg (185 lb)   09/07/22 83 9 kg (185 lb)        Temp Readings from Last 3 Encounters:   10/13/22 (!) 97 °F (36 1 °C)   09/08/22 97 6 °F (36 4 °C) (Temporal)   09/07/22 (!) 97 1 °F (36 2 °C)        BP Readings from Last 3 Encounters:   10/13/22 130/80   09/08/22 136/76   09/07/22 122/84         Pulse Readings from Last 3 Encounters:   10/13/22 71   09/08/22 70   09/07/22 87        Physical Exam  Vitals reviewed  Constitutional:       General: She is not in acute distress  Appearance: She is well-developed  She is not diaphoretic  HENT:      Head: Normocephalic and atraumatic  Eyes:      Conjunctiva/sclera: Conjunctivae normal    Neck:      Trachea: No tracheal deviation  Cardiovascular:      Rate and Rhythm: Normal rate and regular rhythm  Heart sounds: No murmur heard  No friction rub  No gallop     Pulmonary: Effort: Pulmonary effort is normal  No respiratory distress  Breath sounds: Normal breath sounds  No wheezing or rales  Chest:      Chest wall: No tenderness  Abdominal:      General: There is no distension  Palpations: Abdomen is soft  Tenderness: There is no abdominal tenderness  Musculoskeletal:      Cervical back: Normal range of motion and neck supple  Right lower leg: No edema  Left lower leg: No edema  Lymphadenopathy:      Cervical: No cervical adenopathy  Skin:     General: Skin is warm and dry  Coloration: Skin is not pale  Findings: No erythema  Neurological:      Mental Status: She is alert and oriented to person, place, and time  Psychiatric:         Behavior: Behavior normal          Thought Content: Thought content normal          Judgment: Judgment normal          ECO  Goals and Barriers:  Current Goal: Minimize effects of disease  Barriers: None  Patient's Capacity to Self Care:  Patient is able to self care      Code Status: @[facility]@

## 2022-10-18 ENCOUNTER — APPOINTMENT (OUTPATIENT)
Dept: LAB | Facility: AMBULARY SURGERY CENTER | Age: 72
End: 2022-10-18
Payer: MEDICARE

## 2022-10-18 DIAGNOSIS — C34.91 MALIGNANT NEOPLASM OF UNSPECIFIED PART OF RIGHT BRONCHUS OR LUNG (HCC): ICD-10-CM

## 2022-10-18 DIAGNOSIS — F41.1 GENERALIZED ANXIETY DISORDER: ICD-10-CM

## 2022-10-18 LAB — TSH SERPL DL<=0.05 MIU/L-ACNC: 1.63 UIU/ML (ref 0.45–4.5)

## 2022-10-18 PROCEDURE — 84443 ASSAY THYROID STIM HORMONE: CPT

## 2022-10-21 ENCOUNTER — TELEPHONE (OUTPATIENT)
Dept: HEMATOLOGY ONCOLOGY | Facility: CLINIC | Age: 72
End: 2022-10-21

## 2022-10-21 NOTE — TELEPHONE ENCOUNTER
Called and spoke with patient  Discussed that she does not need both appointments on 11/7 and 11/17  She is scheduled for a CT on 11/14  Appointment on 11/7 cancelled  Patient verbalized understanding

## 2022-10-21 NOTE — TELEPHONE ENCOUNTER
CALL RETURN FORM   Reason for patient call? Patient would like to confirm if the scheduled visits with Dr Eligio West on 11/07 and 11/17 are both necessary or if they could be combined to one appointment    Patient's primary oncologist? Dr Eligio West   Name of person the patient was calling for? Dr Landon Rudd office   Any additional information to add, if applicable? N/A   Informed patient that the message will be forwarded to the team and someone will get back to them as soon as possible    Did you relay this information to the patient?  Yes

## 2022-11-03 ENCOUNTER — APPOINTMENT (OUTPATIENT)
Dept: LAB | Facility: AMBULARY SURGERY CENTER | Age: 72
End: 2022-11-03

## 2022-11-03 DIAGNOSIS — E11.9 TYPE 2 DIABETES MELLITUS WITHOUT COMPLICATION, WITHOUT LONG-TERM CURRENT USE OF INSULIN (HCC): ICD-10-CM

## 2022-11-03 DIAGNOSIS — E78.2 MIXED HYPERLIPIDEMIA: ICD-10-CM

## 2022-11-03 LAB
CHOLEST SERPL-MCNC: 122 MG/DL
EST. AVERAGE GLUCOSE BLD GHB EST-MCNC: 151 MG/DL
HBA1C MFR BLD: 6.9 %
HDLC SERPL-MCNC: 44 MG/DL
LDLC SERPL CALC-MCNC: 49 MG/DL (ref 0–100)
TRIGL SERPL-MCNC: 143 MG/DL

## 2022-11-05 DIAGNOSIS — M79.10 MYALGIA DUE TO STATIN: ICD-10-CM

## 2022-11-05 DIAGNOSIS — T46.6X5A MYALGIA DUE TO STATIN: ICD-10-CM

## 2022-11-05 RX ORDER — ATORVASTATIN CALCIUM 40 MG/1
TABLET, FILM COATED ORAL
Qty: 90 TABLET | Refills: 1 | Status: SHIPPED | OUTPATIENT
Start: 2022-11-05

## 2022-11-14 ENCOUNTER — OFFICE VISIT (OUTPATIENT)
Dept: PALLIATIVE MEDICINE | Facility: CLINIC | Age: 72
End: 2022-11-14

## 2022-11-14 ENCOUNTER — HOSPITAL ENCOUNTER (OUTPATIENT)
Dept: CT IMAGING | Facility: HOSPITAL | Age: 72
Discharge: HOME | End: 2022-11-14
Attending: INTERNAL MEDICINE

## 2022-11-14 VITALS
HEIGHT: 64 IN | OXYGEN SATURATION: 99 % | SYSTOLIC BLOOD PRESSURE: 118 MMHG | BODY MASS INDEX: 30.73 KG/M2 | HEART RATE: 65 BPM | TEMPERATURE: 96.6 F | WEIGHT: 180 LBS | DIASTOLIC BLOOD PRESSURE: 58 MMHG

## 2022-11-14 DIAGNOSIS — Z86.73 HISTORY OF STROKE: ICD-10-CM

## 2022-11-14 DIAGNOSIS — C34.91 MALIGNANT NEOPLASM OF UNSPECIFIED PART OF RIGHT BRONCHUS OR LUNG (HCC): ICD-10-CM

## 2022-11-14 DIAGNOSIS — F32.5 MAJOR DEPRESSIVE DISORDER WITH SINGLE EPISODE, IN FULL REMISSION (HCC): ICD-10-CM

## 2022-11-14 DIAGNOSIS — F41.9 ANXIETY: ICD-10-CM

## 2022-11-14 DIAGNOSIS — M25.50 ARTHRALGIA, UNSPECIFIED JOINT: ICD-10-CM

## 2022-11-14 DIAGNOSIS — T45.1X5A NEUROPATHY DUE TO CHEMOTHERAPEUTIC DRUG (HCC): ICD-10-CM

## 2022-11-14 DIAGNOSIS — C79.52 SECONDARY MALIGNANT NEOPLASM OF BONE AND BONE MARROW (HCC): ICD-10-CM

## 2022-11-14 DIAGNOSIS — C79.51 SECONDARY MALIGNANT NEOPLASM OF BONE AND BONE MARROW (HCC): ICD-10-CM

## 2022-11-14 DIAGNOSIS — I50.32 CHRONIC DIASTOLIC HEART FAILURE (HCC): ICD-10-CM

## 2022-11-14 DIAGNOSIS — K21.9 GASTROESOPHAGEAL REFLUX DISEASE WITHOUT ESOPHAGITIS: ICD-10-CM

## 2022-11-14 DIAGNOSIS — G89.3 CANCER RELATED PAIN: Chronic | ICD-10-CM

## 2022-11-14 DIAGNOSIS — Z51.5 PALLIATIVE CARE PATIENT: ICD-10-CM

## 2022-11-14 DIAGNOSIS — T45.1X5A CINV (CHEMOTHERAPY-INDUCED NAUSEA AND VOMITING): Chronic | ICD-10-CM

## 2022-11-14 DIAGNOSIS — G62.0 NEUROPATHY DUE TO CHEMOTHERAPEUTIC DRUG (HCC): ICD-10-CM

## 2022-11-14 DIAGNOSIS — C34.90 ADENOCARCINOMA OF LUNG, STAGE 4, UNSPECIFIED LATERALITY (HCC): Primary | ICD-10-CM

## 2022-11-14 DIAGNOSIS — R11.2 CINV (CHEMOTHERAPY-INDUCED NAUSEA AND VOMITING): Chronic | ICD-10-CM

## 2022-11-14 RX ORDER — VENLAFAXINE 37.5 MG/1
37.5 TABLET ORAL DAILY
Qty: 90 TABLET | Refills: 3 | Status: SHIPPED | OUTPATIENT
Start: 2022-11-14

## 2022-11-14 RX ORDER — BUSPIRONE HYDROCHLORIDE 7.5 MG/1
7.5 TABLET ORAL 3 TIMES DAILY
Qty: 180 TABLET | Refills: 0
Start: 2022-11-14

## 2022-11-14 NOTE — PROGRESS NOTES
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)    3  History of stroke    4  Chronic diastolic heart failure ([place] [address] )    5  Anxiety    6  Major depressive disorder with single episode, in full remission ([place] [address] )    7  Gastroesophageal reflux disease without esophagitis    8  Cancer related pain    9  CINV (chemotherapy-induced nausea and vomiting)    10  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[place] [address] )    11  Palliative care patient    12  Arthralgia, unspecified joint          • Anxiety is heightened; may be s/t sotorazib interaction (lowers serum concentrations of buspirone, no major interactions w/ venlafaxine)  o Increase buspirone to 7 5mg TID   o Continue venlafaxine at low dose  • Continue omeprazole for reflux  Effective  • Patient endorses mild joint pain, not sufficient enough to warrant regular medication use   o She may continue oxyIR PRN, Tylenol PRN  • Counseled on bowel regimen for constipation  • Patient declines medications for occasional mild nausea  • Patient has had some recent weight loss; she declines Nutrition referral   • Continue disease-directed cares  • Patient has received 4 [id number] vaccinations  • Reviewed notes (Medical Oncology, Neurology, Cardiology, PCP, Orthopedics), labs (11/3/22 Cr 1 64, eGFR 30, alb 2 9, Hb 10 7, A1c 6 9), imaging (8/30/22 CT chest)  Return in about 3 months (around 2/14/2023)  • Emotional support provided  • Medication safety issues addressed - no driving under the influence of narcotics, watch for adverse effects including AMS and respiratory depression, keep medications stored in a safe/locked environment        Requested Prescriptions     Signed Prescriptions Disp Refills   • busPIRone (BUSPAR) 7 5 mg tablet 180 tablet 0     Sig: Take 1 tablet (7 5 mg total) by mouth 3 (three) times a day   • venlafaxine (EFFEXOR) 37 5 mg tablet 90 tablet 3     Sig: Take 1 tablet (37 5 mg total) by mouth daily       Medications Discontinued During This Encounter   Medication Reason   • busPIRone (BUSPAR) 7 5 mg tablet Reorder   • venlafaxine (EFFEXOR) 37 5 mg tablet Reorder       Representatives have queried the patient's controlled substance dispensing history in the Prescription Drug Monitoring Program in compliance with regulations before I have prescribed any controlled substances  The prescription history is consistent with prescribed therapy and our practice policies  30 minutes were spent in this ambulatory visit with greater than 50% of the time spent face to face with patient in counseling or coordination of care including discussions of symptom assessment and management, medication review, medication adjustment, psychosocial support, chart review, imaging review, lab review, goals of care, supportive listening and anticipatory guidance  All of the patient's questions were answered during this discussion  SUBJECTIVE:  Chief Complaint   Patient presents with   • Follow-up   • Anxiety   • Cancer   • Pain   • Nausea   • Counseling   • Constipation   • Weight Loss        TUCKER    Yajaira Pearce is a 74 y o  female w/ stage IV non-small cell lung cancer (diagnosed 2016) w/ osseous metastasis s/p chemotherapy + immunotherapy + RT, DM2, HFpEF, Afib, CKD, HTN+HLD, h/o CVA  She follows w/ Cesia Astudillo PA-C + Dr Eligio West (Medical Oncology), Pending sale to [facility]  Plan includes systemic therapy w/ sotorazib  Patient is known to [facility], [place], operated by [facility]; seen 5/5/22 for symptom assessment and management, medication review, psychosocial support, chart review, imaging review, lab review, supportive listening and anticipatory guidance  Patient reports her anxiety is "elevated" in recent weeks, since starting her new treatment  Sotorazib can reduce serum concentrations of buspirone   She is amenable to adjusting her regimen  She has a CT scan planned for today, and is anxious to see if her regimen has been effective  She states, "I expect the best" possibilities, but is frustrated that she needs to continue cancer-related treatments after so many years  Patient reports her sleep is "great"  She states her appetite is "good", but has been losing weight recently  She has some occasional mild nausea; she does not feel that she needs medications for this (laying down and "relaxing" help reduce nausea)  She has some occasional constipation  GERD is controlled  PDMP shows no concerns  The following portions of the medical history were reviewed: past medical history, surgical history, problem list, medication list, family history, and social history        Current Outpatient Medications:   •  acetaminophen (TYLENOL) 325 mg tablet, Take 650 mg by mouth every 6 (six) hours as needed for mild pain, Disp: , Rfl:   •  apixaban (Eliquis) 5 mg, TAKE 1 TABLET TWICE A DAY, Disp: 180 tablet, Rfl: 3  •  aspirin (ECOTRIN LOW STRENGTH) 81 mg EC tablet, Take 1 tablet (81 mg total) by mouth daily, Disp: 30 tablet, Rfl: 0  •  atorvastatin (LIPITOR) 40 mg tablet, TAKE 1 TABLET BY MOUTH EVERY DAY, Disp: 90 tablet, Rfl: 1  •  busPIRone (BUSPAR) 7 5 mg tablet, Take 1 tablet (7 5 mg total) by mouth 3 (three) times a day, Disp: 180 tablet, Rfl: 0  •  cyanocobalamin (VITAMIN B-12) 100 mcg tablet, Take by mouth daily, Disp: , Rfl:   •  folic acid (FOLVITE) 1 mg tablet, Take 1 tablet (1 mg total) by mouth daily (Patient taking differently: Take 1 mg by mouth as needed), Disp: 90 tablet, Rfl: 1  •  lidocaine-prilocaine (EMLA) cream, Apply to port site 30 minutes prior to infusion and cover with a dressing, Disp: 30 g, Rfl: 1  •  linaGLIPtin (Tradjenta) 5 MG TABS, Take 5 mg by mouth daily, Disp: 90 tablet, Rfl: 3  •  loperamide (IMODIUM) 2 mg capsule, Take 2 mg by mouth 4 (four) times a day as needed for diarrhea  , Disp: , Rfl:   •  loratadine (CLARITIN) 10 mg tablet, Take 10 mg by mouth as needed  , Disp: , Rfl:   •  metFORMIN (GLUCOPHAGE) 500 mg tablet, Take 1 tablet (500 mg total) by mouth 2 (two) times a day with meals (Patient taking differently: Take 1,000 mg by mouth 2 (two) times a day with meals), Disp: 180 tablet, Rfl: 3  •  metoprolol tartrate (LOPRESSOR) 25 mg tablet, Take 1 tablet (25 mg total) by mouth every 12 (twelve) hours, Disp: 180 tablet, Rfl: 3  •  omeprazole (PriLOSEC) 20 mg delayed release capsule, Take 1 capsule (20 mg total) by mouth daily, Disp: 90 capsule, Rfl: 3  •  potassium chloride (Klor-Con M10) 10 mEq tablet, Take 1 tablet (10 mEq total) by mouth daily, Disp: 90 tablet, Rfl: 3  •  sotalol (BETAPACE) 80 mg tablet, Take 1 tablet (80 mg total) by mouth 2 (two) times a day, Disp: 180 tablet, Rfl: 3  •  valsartan (DIOVAN) 160 mg tablet, TAKE 1 TABLET BY MOUTH 2 TIMES A DAY , Disp: 180 tablet, Rfl: 1  •  venlafaxine (EFFEXOR) 37 5 mg tablet, Take 1 tablet (37 5 mg total) by mouth daily, Disp: 90 tablet, Rfl: 3  •  furosemide (LASIX) 20 mg tablet, TAKE 1 TABLET (20 MG TOTAL) BY MOUTH DAILY AS NEEDED (LEG EDEMA) (Patient not taking: Reported on 11/14/2022), Disp: 90 tablet, Rfl: 1  •  oxyCODONE (Roxicodone) 5 immediate release tablet, Take 1 tablet (5 mg total) by mouth every 6 (six) hours as needed for moderate pain or severe pain Max Daily Amount: 20 mg (Patient not taking: No sig reported), Disp: 30 tablet, Rfl: 0  •  Sotorasib 120 MG TABS, Take 960 mg by mouth daily (Patient not taking: Reported on 11/14/2022), Disp: 240 tablet, Rfl: 3    Review of Systems   Constitutional: Positive for fatigue and unexpected weight change  Gastrointestinal: Positive for constipation (occasional) and nausea (occasional, mild)  Musculoskeletal: Positive for arthralgias  Psychiatric/Behavioral: The patient is nervous/anxious (worse in recent weeks)  All other systems reviewed and are negative        OBJECTIVE:  BP 118/58 (BP Location: Left arm, Patient Position: Sitting, Cuff Size: Standard)   Pulse 65   Temp (!) 96 6 °F (35 9 °C) (Temporal)   Ht 5' 4" (1 626 m)   Wt 81 6 kg (180 lb)   LMP  (LMP Unknown)   SpO2 99%   BMI 30 90 kg/m²   Physical Exam  Vitals reviewed  Constitutional:       General: She is not in acute distress  Appearance: She is not toxic-appearing  HENT:      Head: Normocephalic and atraumatic  Right Ear: External ear normal       Left Ear: External ear normal    Eyes:      General: No scleral icterus  Right eye: No discharge  Left eye: No discharge  Extraocular Movements: Extraocular movements intact  Conjunctiva/sclera: Conjunctivae normal       Pupils: Pupils are equal, round, and reactive to light  Cardiovascular:      Rate and Rhythm: Normal rate  Pulmonary:      Effort: Pulmonary effort is normal  No tachypnea, bradypnea, accessory muscle usage or respiratory distress  Abdominal:      General: There is no distension  Tenderness: There is no guarding  Musculoskeletal:      Cervical back: Normal range of motion  Right lower leg: No edema  Left lower leg: No edema  Skin:     General: Skin is dry  Coloration: Skin is not pale  Neurological:      Mental Status: She is alert and oriented to person, place, and time  Cranial Nerves: No dysarthria or facial asymmetry  Gait: Gait is intact  Psychiatric:         Attention and Perception: Attention normal          Mood and Affect: Mood and affect normal          Speech: Speech normal          Behavior: Behavior normal  Behavior is cooperative  Thought Content:  Thought content normal          Cognition and Memory: Cognition and memory normal          Judgment: Judgment normal         Daivd Heimlich, MD  [facility]      Portions of this document may have been created using dictation software and as such some "sound alike" terms may have been generated by the system  Do not hesitate to contact me with any questions or clarifications

## 2022-11-14 NOTE — PATIENT INSTRUCTIONS
It was good to see you today  Thank you for coming in  Let's try increasing the Buspar to 7 5mg three times per day  Use existing supply  If constipation is an issue:  Drink PLENTY of water  This is important to keep the gut moving  Some people have success w/ using prunes, prune juice, certain fruits or vegetables (apples, bananas, prunes, pears, raspberries, and vegetables like string beans, broccoli, spinach, kale, squash, lentils, peas, and beans), or fiber gummies  Try a probiotic  This could be yogurt or kefir, or fermented beverages such as kombucha, but probiotics are also available in capsule form  Aim for 10-15 billion colony-forming-units, w/ bacteria such as Lactobacillus / Saccharomyces / Actinomyces  Osmotic laxatives (Miralax, magnesium citrate, Milk of Magnesia) can be very useful for opioid-induced constipation (OIC); take daily to prevent OIC  Bulk laxatives (Citrucel, Metamucil, Fibercon, Benefiber, wheat germ) are useful for constipation in patients who are not taking opioids, but are not recommended if you are taking opioids  Colace is good for softening hard stools, or preventing constipation when opioids are being used - but does not stimulate the bowel to move things along once constipation has occurred  You can use senna, 1 to 2 tabs, once or twice daily as needed for constipation  Use as directed on the box/bottle  Senna is also available in a tea ("Smooth Move")  Should that not be enough for your constipation, you can try Dulcolax  Should that not be enough, consider an enema  All of these medications are available over-the-counter  Let me know if you would like a referral to Nutrition  Return in about 3 months  If something changes and you need to come in sooner, please call our office  Call us for refills on medications that we supply, as needed  PRESCRIPTION REFILL REMINDER:  All medication refills should be requested prior to [facility] on Friday   Any refill requests after noon on Friday would be addressed the following Monday

## 2022-11-15 ENCOUNTER — OFFICE VISIT (OUTPATIENT)
Dept: FAMILY MEDICINE CLINIC | Facility: CLINIC | Age: 72
End: 2022-11-15

## 2022-11-15 VITALS
HEART RATE: 70 BPM | OXYGEN SATURATION: 100 % | TEMPERATURE: 96.4 F | SYSTOLIC BLOOD PRESSURE: 120 MMHG | BODY MASS INDEX: 31.89 KG/M2 | WEIGHT: 180 LBS | HEIGHT: 63 IN | RESPIRATION RATE: 18 BRPM | DIASTOLIC BLOOD PRESSURE: 60 MMHG

## 2022-11-15 DIAGNOSIS — Z23 ENCOUNTER FOR IMMUNIZATION: ICD-10-CM

## 2022-11-15 DIAGNOSIS — E11.9 TYPE 2 DIABETES MELLITUS WITHOUT COMPLICATION, WITHOUT LONG-TERM CURRENT USE OF INSULIN (HCC): Primary | ICD-10-CM

## 2022-11-15 DIAGNOSIS — N18.4 STAGE 4 CHRONIC KIDNEY DISEASE (HCC): ICD-10-CM

## 2022-11-15 DIAGNOSIS — E78.5 DYSLIPIDEMIA: ICD-10-CM

## 2022-11-15 DIAGNOSIS — I10 PRIMARY HYPERTENSION: ICD-10-CM

## 2022-11-15 LAB
LEFT EYE DIABETIC RETINOPATHY: NORMAL
LEFT EYE IMAGE QUALITY: NORMAL
LEFT EYE MACULAR EDEMA: NORMAL
LEFT EYE OTHER RETINOPATHY: NORMAL
RIGHT EYE DIABETIC RETINOPATHY: NORMAL
RIGHT EYE IMAGE QUALITY: NORMAL
RIGHT EYE MACULAR EDEMA: NORMAL
RIGHT EYE OTHER RETINOPATHY: NORMAL
SEVERITY (EYE EXAM): NORMAL

## 2022-11-15 NOTE — ASSESSMENT & PLAN NOTE
Lab Results   Component Value Date    EGFR 30 11/03/2022    EGFR 33 10/18/2022    EGFR 28 10/03/2022    CREATININE 1 64 (H) 11/03/2022    CREATININE 1 51 (H) 10/18/2022    CREATININE 1 75 (H) 10/03/2022   watching kidney function

## 2022-11-15 NOTE — PROGRESS NOTES
Assessment/Plan:    1  Type 2 diabetes mellitus without complication, without long-term current use of insulin (HCC)  Assessment & Plan:  stable  Lab Results   Component Value Date    HGBA1C 6 9 (H) 11/03/2022       Orders:  -     IRIS Diabetic eye exam  -     Hemoglobin A1C; Future; Expected date: 05/01/2023  -     Microalbumin / creatinine urine ratio; Future; Expected date: 05/01/2023    2  Primary hypertension  Assessment & Plan:  Stable on current meds    Orders:  -     Comprehensive metabolic panel; Future; Expected date: 05/01/2023    3  Dyslipidemia  Assessment & Plan:  Check lipids  On lipitor    Orders:  -     Lipid Panel with Direct LDL reflex; Future; Expected date: 05/01/2023  -     TSH, 3rd generation with Free T4 reflex; Future; Expected date: 05/01/2023    4  Encounter for immunization  -     Pneumococcal Conjugate Vaccine 20-valent (PCV20)    5  Stage 4 chronic kidney disease [facility])  Assessment & Plan:  Lab Results   Component Value Date    EGFR 30 11/03/2022    EGFR 33 10/18/2022    EGFR 28 10/03/2022    CREATININE 1 64 (H) 11/03/2022    CREATININE 1 51 (H) 10/18/2022    CREATININE 1 75 (H) 10/03/2022   watching kidney function              There are no Patient Instructions on file for this visit  Return in 6 months (on 5/15/2023) for Diabetes follow-up  Subjective:      Patient ID: Alton Olmstead is a 74 y o  female  Chief Complaint   Patient presents with   • Follow-up     Patient here for follow up       Here for follow up  Seeing palliative- tired of test, doctors  Having incontience issues      Diabetes  She presents for her follow-up diabetic visit  She has type 2 diabetes mellitus  Her disease course has been stable  There are no hypoglycemic associated symptoms  There are no diabetic associated symptoms  There are no hypoglycemic complications  Symptoms are stable  There are no diabetic complications   Risk factors for coronary artery disease include diabetes mellitus, hypertension and dyslipidemia  She is compliant with treatment all of the time  She is following a diabetic diet  She has not had a previous visit with a dietitian  She rarely participates in exercise  There is no change in her home blood glucose trend  An ACE inhibitor/angiotensin II receptor blocker is being taken  She does not see a podiatrist Eye exam is not current  Hypertension  This is a chronic problem  The current episode started more than 1 year ago  The problem is controlled  There are no associated agents to hypertension  Past treatments include angiotensin blockers  The current treatment provides significant improvement  There are no compliance problems  Hyperlipidemia  This is a chronic problem  The current episode started more than 1 year ago  The problem is controlled  Recent lipid tests were reviewed and are normal  There are no known factors aggravating her hyperlipidemia  The current treatment provides significant improvement of lipids  There are no compliance problems  Risk factors for coronary artery disease include dyslipidemia, diabetes mellitus and hypertension         The following portions of the patient's history were reviewed and updated as appropriate: allergies, current medications, past family history, past medical history, past social history, past surgical history and problem list     Review of Systems      Current Outpatient Medications   Medication Sig Dispense Refill   • acetaminophen (TYLENOL) 325 mg tablet Take 650 mg by mouth every 6 (six) hours as needed for mild pain     • apixaban (Eliquis) 5 mg TAKE 1 TABLET TWICE A  tablet 3   • aspirin (ECOTRIN LOW STRENGTH) 81 mg EC tablet Take 1 tablet (81 mg total) by mouth daily 30 tablet 0   • atorvastatin (LIPITOR) 40 mg tablet TAKE 1 TABLET BY MOUTH EVERY DAY 90 tablet 1   • busPIRone (BUSPAR) 7 5 mg tablet Take 1 tablet (7 5 mg total) by mouth 3 (three) times a day 180 tablet 0   • cyanocobalamin (VITAMIN B-12) 100 mcg tablet Take by mouth daily     • folic acid (FOLVITE) 1 mg tablet Take 1 tablet (1 mg total) by mouth daily (Patient taking differently: Take 1 mg by mouth as needed) 90 tablet 1   • lidocaine-prilocaine (EMLA) cream Apply to port site 30 minutes prior to infusion and cover with a dressing 30 g 1   • linaGLIPtin (Tradjenta) 5 MG TABS Take 5 mg by mouth daily 90 tablet 3   • loperamide (IMODIUM) 2 mg capsule Take 2 mg by mouth 4 (four) times a day as needed for diarrhea       • metFORMIN (GLUCOPHAGE) 500 mg tablet Take 1 tablet (500 mg total) by mouth 2 (two) times a day with meals (Patient taking differently: Take 1,000 mg by mouth 2 (two) times a day with meals) 180 tablet 3   • metoprolol tartrate (LOPRESSOR) 25 mg tablet Take 1 tablet (25 mg total) by mouth every 12 (twelve) hours 180 tablet 3   • omeprazole (PriLOSEC) 20 mg delayed release capsule Take 1 capsule (20 mg total) by mouth daily 90 capsule 3   • potassium chloride (Klor-Con M10) 10 mEq tablet Take 1 tablet (10 mEq total) by mouth daily 90 tablet 3   • sotalol (BETAPACE) 80 mg tablet Take 1 tablet (80 mg total) by mouth 2 (two) times a day 180 tablet 3   • Sotorasib 120 MG TABS Take 960 mg by mouth daily 240 tablet 3   • valsartan (DIOVAN) 160 mg tablet TAKE 1 TABLET BY MOUTH 2 TIMES A DAY  180 tablet 1   • venlafaxine (EFFEXOR) 37 5 mg tablet Take 1 tablet (37 5 mg total) by mouth daily 90 tablet 3   • furosemide (LASIX) 20 mg tablet TAKE 1 TABLET (20 MG TOTAL) BY MOUTH DAILY AS NEEDED (LEG EDEMA) (Patient not taking: No sig reported) 90 tablet 1   • loratadine (CLARITIN) 10 mg tablet Take 10 mg by mouth as needed   (Patient not taking: Reported on 11/15/2022)       No current facility-administered medications for this visit         Objective:    /60 (BP Location: Left arm, Patient Position: Sitting, Cuff Size: Standard)   Pulse 70   Temp (!) 96 4 °F (35 8 °C) (Tympanic)   Resp 18   Ht 5' 3 39" (1 61 m)   Wt 81 6 kg (180 lb)   LMP  (LMP Unknown) SpO2 100%   BMI 31 49 kg/m²        Physical Exam  Vitals and nursing note reviewed  Constitutional:       Appearance: Normal appearance  HENT:      Head: Normocephalic and atraumatic  Eyes:      Extraocular Movements: Extraocular movements intact  Pupils: Pupils are equal, round, and reactive to light  Cardiovascular:      Rate and Rhythm: Normal rate and regular rhythm  Pulses: Normal pulses  Heart sounds: Normal heart sounds  Pulmonary:      Effort: Pulmonary effort is normal       Breath sounds: Normal breath sounds  Abdominal:      General: Abdomen is flat  Palpations: Abdomen is soft  Musculoskeletal:      Cervical back: Normal range of motion and neck supple  Neurological:      General: No focal deficit present  Mental Status: She is alert and oriented to person, place, and time  Psychiatric:         Mood and Affect: Mood normal          Behavior: Behavior normal          Thought Content:  Thought content normal          Judgment: Judgment normal                 Elza Blood, DO

## 2022-11-17 ENCOUNTER — OFFICE VISIT (OUTPATIENT)
Dept: HEMATOLOGY ONCOLOGY | Facility: CLINIC | Age: 72
End: 2022-11-17

## 2022-11-17 VITALS
DIASTOLIC BLOOD PRESSURE: 62 MMHG | TEMPERATURE: 97.9 F | RESPIRATION RATE: 16 BRPM | HEART RATE: 66 BPM | BODY MASS INDEX: 31.07 KG/M2 | OXYGEN SATURATION: 99 % | WEIGHT: 182 LBS | SYSTOLIC BLOOD PRESSURE: 112 MMHG | HEIGHT: 64 IN

## 2022-11-17 DIAGNOSIS — C34.91 MALIGNANT NEOPLASM OF UNSPECIFIED PART OF RIGHT BRONCHUS OR LUNG (HCC): Primary | ICD-10-CM

## 2022-11-17 DIAGNOSIS — C41.9 MALIGNANT NEOPLASM OF BONE WITH METASTASES (HCC): ICD-10-CM

## 2022-11-17 NOTE — PROGRESS NOTES
Palliative and Supportive Care   Elizabeth Griffith 68 y o  female [id number]    Assessment/Plan:  1  Adenocarcinoma of left lung, stage 4 ([address] )    2  Anxiety    3  Nausea    4  Other fatigue    5  Caregiver stress      Requested Prescriptions     Signed Prescriptions Disp Refills    venlafaxine (EFFEXOR) 37 5 mg tablet 180 tablet 1     Sig: Take 1 tablet (37 5 mg total) by mouth 2 (two) times a day     Medications Discontinued During This Encounter   Medication Reason    venlafaxine (EFFEXOR) 37 5 mg tablet Reorder     She will call if she needs refills on zofran  She has some caregiver stress taking care of her disabled brother but is doing well  She takes a nap to help with her level of fatigue  Representatives have queried the patient's controlled substance dispensing history in the Prescription Drug Monitoring Program in compliance with regulations before I have prescribed any controlled substances  The prescription history is consistent with prescribed therapy and our practice policies  Return in about 3 months (around 4/20/2020), or if symptoms worsen or fail to improve, for symptom assessment and management  Subjective:   Chief Complaint  Follow up visit for:  symptom management  In attendance at this visit: Doug Brar is a 68 y o  female with stage IV lung cancer with osseous mets  She was diagnosed in 8/2016  At times she has received palliative XRT  Her medical oncologist is Dr Shiela Mueller  She has progressed and had complications through treatment  She is on a prednisone wean due to a previous hx of immunotherapy related pneumonitis  She gets maintenance Alimta every 3 weeks  The patient is seen in the department of Palliative & Supportive Care for concurrent symptom management  She feels that she is managing as well as can be expected  Her mood is good    She likes her "happy pill "  As opposed to when we first met, she has no pain which she is grateful for  She admits to a level of fatigue but manages to take a nap every afternoon  She continues to care for her disabled brother  Nausea is only occasional and zofran works well  The following portions of the medical history were reviewed: past medical history, problem list, medication list, and social history      Current Outpatient Medications:     acetaminophen (TYLENOL) 325 mg tablet, Take 650 mg by mouth every 6 (six) hours as needed for mild pain, Disp: , Rfl:     apixaban (ELIQUIS) 5 mg, TAKE 1 TABLET TWICE A DAY, Disp: 180 tablet, Rfl: 3    Calcium Carb-Cholecalciferol 600-800 MG-UNIT TABS, Take 1 tablet by mouth 2 (two) times a day, Disp: , Rfl:     Cholecalciferol (VITAMIN D) 2000 units CAPS, Take by mouth, Disp: , Rfl:     cyanocobalamin (VITAMIN B-12) 100 mcg tablet, Take by mouth daily, Disp: , Rfl:     dexamethasone (DECADRON) 4 mg tablet, TAKE ONE TAB TWICE A DAY WITH FOOD, DAY BEFORE,DAY OF,DAY AFTER CHEMO, Disp: 30 tablet, Rfl: 1    Empagliflozin 10 MG TABS, Take 1 tablet (10 mg total) by mouth every morning, Disp: 30 tablet, Rfl: 5    Folic Acid 0 8 MG CAPS, , Disp: , Rfl:     furosemide (LASIX) 40 mg tablet, as needed, Disp: , Rfl:     metFORMIN (GLUCOPHAGE) 1000 MG tablet, TAKE 1 TABLET TWICE DAILY  WITH MEALS, Disp: 180 tablet, Rfl: 3    metoprolol tartrate (LOPRESSOR) 25 mg tablet, TAKE 1 TABLET EVERY 12     HOURS, Disp: 180 tablet, Rfl: 3    nystatin (MYCOSTATIN) cream, Apply topically 2 (two) times a day, Disp: 30 g, Rfl: 5    omeprazole (PriLOSEC) 20 mg delayed release capsule, TAKE 1 CAPSULE DAILY, Disp: 90 capsule, Rfl: 3    ondansetron (ZOFRAN ODT) 4 mg disintegrating tablet, as needed , Disp: , Rfl:     potassium chloride (KLOR-CON) 20 mEq packet, as needed, Disp: , Rfl:     predniSONE 10 mg tablet, Take 5 mg by mouth every other day , Disp: , Rfl:     sotalol (BETAPACE) 80 mg tablet, TAKE 1 TABLET EVERY 12     HOURS, Disp: 180 tablet, Rfl: 3   TRADJENTA 5 MG TABS, TAKE 1 TABLET DAILY, Disp: 90 tablet, Rfl: 3    venlafaxine (EFFEXOR) 37 5 mg tablet, Take 1 tablet (37 5 mg total) by mouth 2 (two) times a day, Disp: 180 tablet, Rfl: 1  Review of Systems   Constitutional: Positive for fatigue  Gastrointestinal: Positive for nausea  All other systems negative    Objective:  Vital Signs  /62 (BP Location: Left arm, Patient Position: Sitting, Cuff Size: Standard)   Pulse 60   Temp (!) 94 5 °F (34 7 °C) (Tympanic)   Resp 12   Ht 5' 4" (1 626 m)   Wt 95 kg (209 lb 8 oz)   LMP  (LMP Unknown)   SpO2 97%   BMI 35 96 kg/m²    Physical Exam    Constitutional: Appears well-developed and well-nourished  In no acute physical or emotional distress  Head: Normocephalic and atraumatic  Eyes: EOM are normal  No ocular discharge  No scleral icterus  Neck: No visible adenopathy or masses  Respiratory: Effort normal  No stridor  No respiratory distress  Gastrointestinal: No abdominal distension  Musculoskeletal: No edema  Neurological: Alert, oriented and appropriately conversant  Skin: No diaphoresis, no rashes seen on exposed areas of skin  Psychiatric: Displays a normal mood and affect   Behavior, judgement and thought content appear normal  Yes - the patient is able to be screened

## 2022-11-17 NOTE — PROGRESS NOTES
Hematology Outpatient Follow - Up Note  Arthur Hidalgo Nacho 74 y o  female MRN: @ Encounter: [id number]        Date:  11/17/2022        Assessment/ Plan:    Stage 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 of the lung with left scapula involvement diagnosed on 08/2016 PDL expression more than 50%, negative for EGFR mutation, ALK  rearrangement, Ros1 mutation     Treated initially with Pembrolizumab complicated with pneumonitis and later on nivolumab with prednisone 10 mg p o   Daily with excellent response      2   Disease progression in August 2018 in the left scapula with pain no new lesions by PET scan   Status post radiation therapy to the left scapula and treated with Alimta 500 milligram/meter squared, carboplatin AUC 5     After 3 cycles,  CT scan in January 2019 showed stable disease, and she was placed on maintenance Alimta 500 milligram/meter squared every 3 weeks         Alimta dose was reduced to 400 milligram/meter IV due to elevated Creatinine and  then Pemetrexed dose was reduced to 300 milligram/meter squared every 4 weeks    Cycle #38  Alimta was 6/22/21          3   Progression of disease 7/2021         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%   (MAP2K1 mutations are mutually exclusive with BRAF mutations)  There are FDA approved therapies indicated for other disease states such as binimetinib, cobimetinib, selumetinib, trametinib   Given the very small (0 1%) DNA amplification, use of these medications off label are likely to offer minimal benefit        Nivolumab 240 mg flat dose every 3 weeks and  carboplatin AUC 5 added to  pemetrexed which was dose reduced to 250 milligram/meter squared, initiated 8/2/2021     4   Progression of disease   Treatment changed to Taxotere 75mg/m2 10/2021  Ravinder Mak added with cycle 2 11/2021      5   Right basal ganglia and right ischemic CVA on 01/2022 with hospitalization and rehab which may have been secondary to Cyramza     6   Therapy changed to Navelbine 30 mg per m2 every other week since 02/02/2022         7   Chemo induced anemia   Hemoglobin 9 9 6/20/22 compared to 12 3 4/2022;  10 6 5/2022      8   CKD 1  4 - 1 6     9   Atrial fibrillation/ history of CVA on Eliquis     10   Progression of disease   8/30/22- CT chest - Enlarged medial right lower lobe spiculated opacity now measuring 3 8 x 1 9 cm previously measured 2 1 x 1 2 suspicious for malignancy  D/W Dr Geraldine Randall  This appears to be in field where she had RT to T9-T11 in 2016      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, recommended treatment plan is to change treatment to Lumakrans (sotorasib)    960 mg p o  daily in the morning without food     Tolerating therapy very well, CT scan of the chest this morning showed possible stable disease await however for official reading    Follow-up in 2 months with CBC, CMP        Labs and imaging studies are reviewed by ordering provider once results are available  If there are findings that need immediate attention, you will be contacted when results available  Discussing results and the implication on your healthcare is best discussed in person at your follow-up visit  HPI:    Lambert Monge was admitted to the hospital with arrhythmia and was found to have right lower lobe infiltrate in August 2016   She was treated with antibiotics however repeat chest x-ray showed persistent right lower lobe infiltrate  Subsequently the patient had a CT scan of the chest which showed a right perihilar mass, subcarinal lymphadenopathy, lytic lesion of the right costovertebral junction at T10 level   PET scan October 2016 showed a right perihilar mass measuring 3 5 cm with SUV of 8 9, subcarinal lymph nodes measuring 3 4 x 2 2 cm with SUV of 9 2  Nodule in the left upper lobe lung measured 2 x 1 1 cm   There was a lytic lesion involving the right 10th costovertebral junction with SUV of 14  4      Biopsy showed non-small cell carcinoma with features suggesting of adenocarcinoma positive for CK 7, CK 19, CA-19-9, ANEUDY-3, partially positive for P40, p63, negative for TTF-1   She had a history of uterine cancer in 2000 status post hysterectomy and did not require radiation or chemotherapy   She is status post bilateral oophorectomy, right knee replacement,   tonsillectomy       She used to smoke for 35 years 1 pack per day however quit smoking 21 years ago   She used hormonal replacement therapy for 3 years  Norma Elliott has a family history significant for skin cancer in her father and coronary artery disease in mother  Norma Elliott has 2 healthy children      Treated initially with Pembrolizumab December 2016, Finished in May 2017 secondary to grade 3 pneumonitis  Initiated on prednisone     Progression: Nivolumab 240 mg flat dose every 2 weeks along with prednisone 10 mg p o  Daily initiated April 2018- October 2018 with excellent response      Liquid biopsy showed K-MADHURI mutation G12C, no evidence of MSI high        Disease progression in August 2018 in the left scapula with pain no new lesions by PET scan   Status post radiation therapy to the left scapula and treated with Alimta 500 milligram/meter squared, carboplatin AUC 5   After 3 cycles,  CT scan in January 2019 showed stable disease  Carbo discontinued 3/2019    Maintenance Alimta 500 milligram/meter squared every 3 weeks initiated 3/2019        Cr 2/20 was 1 5   Plan was to dose reduce Alimta to 400mg/m2; however cr 2 16 2/24/20 and Alimta was held       3/4/20:  renal u/s  Minimal fullness of the left renal collecting system without matthieu hydronephrosis     Chronic right kidney lower pole cortical scar, with adjacent parenchymal calcification measuring 5 mm      She was on prednisone 5 mg p o  daily because of previous history of pneumonitis  CT scan chest 1/3/2020 showed no evidence of infiltration in the lung parenchyma, prednisone was reduced every other day for 1 month and then discontinued        Progression of disease 7/2021        Nivolumab 240 mg flat dose every 3 weeks and  carboplatin AUC 5 added to pemetrexed which was dose reduced to 250 milligram/meter squared, initiated 8/2/2021     Progression of disease   Treatment changed to Taxotere 75mg/m2 10/2021  Genita Days added with cycle 2 11/2021      Right basal ganglia and right ischemic CVA on 01/2022 with hospitalization and rehab which may have been secondary to [address] changed to Navelbine 30 mg per m2 every other week since 02/02/2022 8/30/22- CT chest - Enlarged medial right lower lobe spiculated opacity now measuring 3 8 x 1 9 cm previously measured 2 1 x 1 2 suspicious for malignancy  Elected not to have radiation therapy or sotorasib given her 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  Interval History: Tolerating sotorasib 960 mg in the morning without food since September 2022    She had a CT scan this morning of the chest I reviewed it it showed minimal progression versus stable disease no new lesions    Previous Treatment:         Test Results:    Imaging: No results found  Labs:   Lab Results   Component Value Date    WBC 7 46 11/03/2022    HGB 10 7 (L) 11/03/2022    HCT 37 3 11/03/2022    MCV 90 11/03/2022     11/03/2022     Lab Results   Component Value Date     11/23/2015    K 4 2 11/03/2022     (H) 11/03/2022    CO2 25 11/03/2022    ANIONGAP 9 11/23/2015    BUN 39 (H) 11/03/2022    CREATININE 1 64 (H) 11/03/2022    GLUCOSE 98 01/03/2022    GLUF 155 (H) 11/03/2022    CALCIUM 9 5 11/03/2022    CORRECTEDCA 10 4 (H) 11/03/2022    AST 16 11/03/2022    ALT 20 11/03/2022    ALKPHOS 105 11/03/2022    PROT 6 8 11/23/2015    BILITOT 0 65 11/23/2015    EGFR 30 11/03/2022       Lab Results   Component Value Date    IRON 79 02/16/2022    TIBC 320 02/16/2022    FERRITIN 49 02/16/2022       No results found for: [id number]      ROS: Review of Systems   Constitutional: Positive for fatigue  Negative for appetite change, chills, diaphoresis and unexpected weight change  HENT:   Negative for mouth sores, nosebleeds, sore throat, trouble swallowing and voice change  Eyes: Negative for eye problems and icterus  Respiratory: Negative for chest tightness, cough, hemoptysis and wheezing  Cardiovascular: Negative for chest pain, leg swelling and palpitations  Gastrointestinal: Negative for abdominal distention, abdominal pain, blood in stool, constipation, diarrhea, nausea and vomiting  Endocrine: Negative for hot flashes  Genitourinary: Negative for bladder incontinence, difficulty urinating, dyspareunia, dysuria and frequency  Musculoskeletal: Negative for arthralgias, back pain, gait problem, neck pain and neck stiffness  Skin: Negative for itching and rash  Neurological: Negative for dizziness, gait problem, headaches, numbness, seizures and speech difficulty  Hematological: Negative for adenopathy  Does not bruise/bleed easily  Psychiatric/Behavioral: Negative for decreased concentration, depression, sleep disturbance and suicidal ideas  The patient is not nervous/anxious  Current Medications: Reviewed  Allergies: Reviewed  PMH/FH/SH:  Reviewed      Physical Exam:    Body surface area is 1 88 meters squared  Wt Readings from Last 3 Encounters:   11/17/22 82 6 kg (182 lb)   11/15/22 81 6 kg (180 lb)   11/14/22 81 6 kg (180 lb)        Temp Readings from Last 3 Encounters:   11/17/22 97 9 °F (36 6 °C) (Tympanic)   11/15/22 (!) 96 4 °F (35 8 °C) (Tympanic)   11/14/22 (!) 96 6 °F (35 9 °C) (Temporal)        BP Readings from Last 3 Encounters:   11/17/22 112/62   11/15/22 120/60   11/14/22 118/58         Pulse Readings from Last 3 Encounters:   11/17/22 66   11/15/22 70   11/14/22 65        Physical Exam  Vitals reviewed  Constitutional:       General: She is not in acute distress  Appearance: She is well-developed and well-nourished  She is not diaphoretic     HENT: Head: Normocephalic and atraumatic  Eyes:      Conjunctiva/sclera: Conjunctivae normal    Neck:      Trachea: No tracheal deviation  Cardiovascular:      Rate and Rhythm: Normal rate and regular rhythm  Heart sounds: No murmur heard  No friction rub  No gallop  Pulmonary:      Effort: Pulmonary effort is normal  No respiratory distress  Breath sounds: Normal breath sounds  No wheezing or rales  Chest:      Chest wall: No tenderness  Abdominal:      General: There is no distension  Palpations: Abdomen is soft  Tenderness: There is no abdominal tenderness  Musculoskeletal:      Cervical back: Normal range of motion and neck supple  Right lower leg: No edema  Left lower leg: No edema  Lymphadenopathy:      Cervical: No cervical adenopathy  Skin:     General: Skin is warm and dry  Coloration: Skin is not pale  Findings: No erythema  Neurological:      Mental Status: She is alert and oriented to person, place, and time  Psychiatric:         Mood and Affect: Mood and affect normal          Behavior: Behavior normal          Thought Content: Thought content normal          Judgment: Judgment normal          ECO  Goals and Barriers:  Current Goal: Minimize effects of disease  Barriers: None  Patient's Capacity to Self Care:  Patient is able to self care      Code Status: [unfilled]

## 2022-11-21 ENCOUNTER — APPOINTMENT (OUTPATIENT)
Dept: LAB | Facility: CLINIC | Age: 72
End: 2022-11-21

## 2022-12-12 DIAGNOSIS — C34.90 ADENOCARCINOMA OF LUNG, STAGE 4, UNSPECIFIED LATERALITY (HCC): ICD-10-CM

## 2022-12-12 DIAGNOSIS — C79.51 SECONDARY MALIGNANT NEOPLASM OF BONE AND BONE MARROW (HCC): ICD-10-CM

## 2022-12-12 DIAGNOSIS — Z51.5 PALLIATIVE CARE PATIENT: ICD-10-CM

## 2022-12-12 DIAGNOSIS — C79.52 SECONDARY MALIGNANT NEOPLASM OF BONE AND BONE MARROW (HCC): ICD-10-CM

## 2022-12-12 DIAGNOSIS — F41.9 ANXIETY: ICD-10-CM

## 2022-12-12 DIAGNOSIS — F32.5 MAJOR DEPRESSIVE DISORDER WITH SINGLE EPISODE, IN FULL REMISSION (HCC): ICD-10-CM

## 2022-12-12 RX ORDER — BUSPIRONE HYDROCHLORIDE 7.5 MG/1
7.5 TABLET ORAL 3 TIMES DAILY
Qty: 180 TABLET | Refills: 0 | Status: SHIPPED | OUTPATIENT
Start: 2022-12-12

## 2022-12-20 ENCOUNTER — TELEPHONE (OUTPATIENT)
Dept: HEMATOLOGY ONCOLOGY | Facility: CLINIC | Age: 72
End: 2022-12-20

## 2022-12-20 ENCOUNTER — APPOINTMENT (OUTPATIENT)
Dept: LAB | Facility: AMBULARY SURGERY CENTER | Age: 72
End: 2022-12-20

## 2022-12-20 DIAGNOSIS — C34.91 MALIGNANT NEOPLASM OF UNSPECIFIED PART OF RIGHT BRONCHUS OR LUNG (HCC): ICD-10-CM

## 2022-12-20 DIAGNOSIS — C41.9 MALIGNANT NEOPLASM OF BONE WITH METASTASES (HCC): ICD-10-CM

## 2022-12-20 DIAGNOSIS — C34.12 MALIGNANT NEOPLASM OF UPPER LOBE OF LEFT LUNG (HCC): ICD-10-CM

## 2022-12-20 LAB
ALBUMIN SERPL BCP-MCNC: 3.2 G/DL (ref 3.5–5)
ALP SERPL-CCNC: 115 U/L (ref 46–116)
ALT SERPL W P-5'-P-CCNC: 23 U/L (ref 12–78)
ANION GAP SERPL CALCULATED.3IONS-SCNC: 4 MMOL/L (ref 4–13)
AST SERPL W P-5'-P-CCNC: 25 U/L (ref 5–45)
BASOPHILS # BLD AUTO: 0.05 THOUSANDS/ÂΜL (ref 0–0.1)
BASOPHILS NFR BLD AUTO: 1 % (ref 0–1)
BILIRUB SERPL-MCNC: 0.32 MG/DL (ref 0.2–1)
BUN SERPL-MCNC: 28 MG/DL (ref 5–25)
CALCIUM ALBUM COR SERPL-MCNC: 10.2 MG/DL (ref 8.3–10.1)
CALCIUM SERPL-MCNC: 9.6 MG/DL (ref 8.3–10.1)
CHLORIDE SERPL-SCNC: 109 MMOL/L (ref 96–108)
CO2 SERPL-SCNC: 26 MMOL/L (ref 21–32)
CREAT SERPL-MCNC: 1.6 MG/DL (ref 0.6–1.3)
EOSINOPHIL # BLD AUTO: 0.15 THOUSAND/ÂΜL (ref 0–0.61)
EOSINOPHIL NFR BLD AUTO: 3 % (ref 0–6)
ERYTHROCYTE [DISTWIDTH] IN BLOOD BY AUTOMATED COUNT: 15.4 % (ref 11.6–15.1)
GFR SERPL CREATININE-BSD FRML MDRD: 31 ML/MIN/1.73SQ M
GLUCOSE SERPL-MCNC: 139 MG/DL (ref 65–140)
HCT VFR BLD AUTO: 35.9 % (ref 34.8–46.1)
HGB BLD-MCNC: 10.8 G/DL (ref 11.5–15.4)
IMM GRANULOCYTES # BLD AUTO: 0.02 THOUSAND/UL (ref 0–0.2)
IMM GRANULOCYTES NFR BLD AUTO: 0 % (ref 0–2)
LYMPHOCYTES # BLD AUTO: 0.92 THOUSANDS/ÂΜL (ref 0.6–4.47)
LYMPHOCYTES NFR BLD AUTO: 16 % (ref 14–44)
MCH RBC QN AUTO: 25.9 PG (ref 26.8–34.3)
MCHC RBC AUTO-ENTMCNC: 30.1 G/DL (ref 31.4–37.4)
MCV RBC AUTO: 86 FL (ref 82–98)
MONOCYTES # BLD AUTO: 0.4 THOUSAND/ÂΜL (ref 0.17–1.22)
MONOCYTES NFR BLD AUTO: 7 % (ref 4–12)
NEUTROPHILS # BLD AUTO: 4.41 THOUSANDS/ÂΜL (ref 1.85–7.62)
NEUTS SEG NFR BLD AUTO: 73 % (ref 43–75)
NRBC BLD AUTO-RTO: 0 /100 WBCS
PLATELET # BLD AUTO: 234 THOUSANDS/UL (ref 149–390)
PMV BLD AUTO: 12.1 FL (ref 8.9–12.7)
POTASSIUM SERPL-SCNC: 4.8 MMOL/L (ref 3.5–5.3)
PROT SERPL-MCNC: 6.7 G/DL (ref 6.4–8.4)
RBC # BLD AUTO: 4.17 MILLION/UL (ref 3.81–5.12)
SODIUM SERPL-SCNC: 139 MMOL/L (ref 135–147)
WBC # BLD AUTO: 5.95 THOUSAND/UL (ref 4.31–10.16)

## 2022-12-20 NOTE — TELEPHONE ENCOUNTER
MEDICATION REFILL ROUTE TO RN    Who is Calling  Patient   Medication  Sotorasib 120MG   How many pills left  30   Preferred Pharmacy / Address  [facility], [address]   [address] [place] [address]    Who is your Physician?  Dr Micaela Shaw   Call back number  [phone]   Relevant Information N/A

## 2022-12-27 ENCOUNTER — DOCUMENTATION (OUTPATIENT)
Dept: HEMATOLOGY ONCOLOGY | Facility: CLINIC | Age: 72
End: 2022-12-27

## 2022-12-28 NOTE — PROGRESS NOTES
12-20-22  [facility] re-enrollment application for MERLYNAKRAS  Provider portion completed and forwarded for signature  Patient portion received Once all signatures and documents are on file, application will be submitted      12-27-22  All signatures and documents are on file   Application faxed

## 2023-01-11 ENCOUNTER — APPOINTMENT (OUTPATIENT)
Dept: LAB | Facility: AMBULARY SURGERY CENTER | Age: 73
End: 2023-01-11

## 2023-01-18 ENCOUNTER — TELEPHONE (OUTPATIENT)
Dept: CARDIOLOGY CLINIC | Facility: CLINIC | Age: 73
End: 2023-01-18

## 2023-01-18 ENCOUNTER — TELEPHONE (OUTPATIENT)
Dept: HEMATOLOGY ONCOLOGY | Facility: CLINIC | Age: 73
End: 2023-01-18

## 2023-01-18 DIAGNOSIS — C34.12 MALIGNANT NEOPLASM OF UPPER LOBE OF LEFT LUNG (HCC): Primary | ICD-10-CM

## 2023-01-18 NOTE — TELEPHONE ENCOUNTER
Per Cm Beavers she submitted application, which was approved and medication should be overnight  Pt called and notified, she was appreciative of this

## 2023-01-18 NOTE — TELEPHONE ENCOUNTER
P/c'd , she is looking for Eliquis samples due to the price of the medication  Do you have any to give her? Please call her if you do      Thank you

## 2023-01-20 ENCOUNTER — OFFICE VISIT (OUTPATIENT)
Dept: HEMATOLOGY ONCOLOGY | Facility: CLINIC | Age: 73
End: 2023-01-20

## 2023-01-20 VITALS
WEIGHT: 180 LBS | SYSTOLIC BLOOD PRESSURE: 130 MMHG | TEMPERATURE: 97.6 F | OXYGEN SATURATION: 98 % | BODY MASS INDEX: 30.73 KG/M2 | HEART RATE: 60 BPM | HEIGHT: 64 IN | DIASTOLIC BLOOD PRESSURE: 60 MMHG | RESPIRATION RATE: 20 BRPM

## 2023-01-20 DIAGNOSIS — D69.6 THROMBOCYTOPENIA, UNSPECIFIED (HCC): ICD-10-CM

## 2023-01-20 DIAGNOSIS — G62.0 NEUROPATHY DUE TO CHEMOTHERAPEUTIC DRUG (HCC): ICD-10-CM

## 2023-01-20 DIAGNOSIS — N18.4 STAGE 4 CHRONIC KIDNEY DISEASE (HCC): ICD-10-CM

## 2023-01-20 DIAGNOSIS — C34.12 MALIGNANT NEOPLASM OF UPPER LOBE OF LEFT LUNG (HCC): Primary | ICD-10-CM

## 2023-01-20 DIAGNOSIS — C79.51 SECONDARY MALIGNANT NEOPLASM OF BONE AND BONE MARROW (HCC): ICD-10-CM

## 2023-01-20 DIAGNOSIS — T45.1X5A NEUROPATHY DUE TO CHEMOTHERAPEUTIC DRUG (HCC): ICD-10-CM

## 2023-01-20 DIAGNOSIS — C34.90 ADENOCARCINOMA OF LUNG, STAGE 4, UNSPECIFIED LATERALITY (HCC): ICD-10-CM

## 2023-01-20 DIAGNOSIS — C79.52 SECONDARY MALIGNANT NEOPLASM OF BONE AND BONE MARROW (HCC): ICD-10-CM

## 2023-01-20 NOTE — PROGRESS NOTES
Hematology/Oncology Outpatient Follow- up Note  Zena Griffith 74 y o  female MRN: @ Encounter: [id number]        Date:  1/20/2023      Assessment / Plan:    Stage 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 of the lung with left scapula involvement diagnosed on 08/2016 PDL expression more than 50%, negative for EGFR mutation, ALK  rearrangement, Ros1 mutation     Treated initially with Pembrolizumab complicated with pneumonitis and later on nivolumab with prednisone 10 mg p o  Daily with excellent response  2   Disease progression in August 2018 in the left scapula with pain no new lesions by PET scan  Status post radiation therapy to the left scapula and treated with Alimta 500 milligram/meter squared, carboplatin AUC 5  After 3 cycles,  CT scan in January 2019 showed stable disease, and she was placed on maintenance Alimta 500 milligram/meter squared every 3 weeks  Alimta dose was reduced to 400 milligram/meter IV due to elevated Creatinine and  then Pemetrexed dose was reduced to 300 milligram/meter squared every 4 weeks  Cycle #38  Alimta was 6/22/21  3   Progression of disease 7/2021  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%  (MAP2K1 mutations are mutually exclusive with BRAF mutations)  There are FDA approved therapies indicated for other disease states such as binimetinib, cobimetinib, selumetinib, trametinib  Given the very small (0 1%) DNA amplification, use of these medications off label are likely to offer minimal benefit  Nivolumab 240 mg flat dose every 3 weeks and  carboplatin AUC 5 added to  pemetrexed which was dose reduced to 250 milligram/meter squared, initiated 8/2/2021     4  Progression of disease  Treatment changed to Taxotere 75mg/m2 10/2021    Cyramza added with cycle 2 11/2021      5   Right basal ganglia and right ischemic CVA on 01/2022 with hospitalization and rehab which may have been secondary to Traceyst[place] 6   Therapy changed to Navelbine 30 mg per m2 every other week since 02/02/2022         7   Chemo induced anemia  Hemoglobin 9 9 6/20/22 compared to 12 3 4/2022;  10 6 5/2022  8   CKD 1  4 - 1 6     9  Atrial fibrillation/ history of CVA on Eliquis     10  Progression of disease  8/30/22- CT chest - Enlarged medial right lower lobe spiculated opacity now measuring 3 8 x 1 9 cm previously measured 2 1 x 1 2 suspicious for malignancy  D/W Dr Nicolás Mahajan  This appears to be in field where she had RT to T9-T11 in 2016  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, treatment plan change treatment to Lumakrans (sotorasib)  960 mg p o  daily in the morning without food      CT scan of the chest 11/2022 Continued enlargement of the right lower lobe paraspinal mass  On the coronal series, it was previously 3 8 x 1 9 cm and is now 4 1 x 2 3 cm     Follow-up CT scan of her chest requested with follow-up thereafter  HPI:   Tish Hooker was admitted to the hospital with arrhythmia and was found to have right lower lobe infiltrate in August 2016  She was treated with antibiotics however repeat chest x-ray showed persistent right lower lobe infiltrate  Subsequently the patient had a CT scan of the chest which showed a right perihilar mass, subcarinal lymphadenopathy, lytic lesion of the right costovertebral junction at T10 level  PET scan October 2016 showed a right perihilar mass measuring 3 5 cm with SUV of 8 9, subcarinal lymph nodes measuring 3 4 x 2 2 cm with SUV of 9 2  Nodule in the left upper lobe lung measured 2 x 1 1 cm  There was a lytic lesion involving the right 10th costovertebral junction with SUV of 14 4  Biopsy showed non-small cell carcinoma with features suggesting of adenocarcinoma positive for CK 7, CK 19, CA-19-9, ANEUDY-3, partially positive for P40, p63, negative for TTF-1    She had a history of uterine cancer in 2000 status post hysterectomy and did not require radiation or chemotherapy  She is status post bilateral oophorectomy, right knee replacement,   tonsillectomy  She used to smoke for 35 years 1 pack per day however quit smoking 21 years ago  She used hormonal replacement therapy for 3 years  She has a family history significant for skin cancer in her father and coronary artery disease in mother  She has 2 healthy children  Treated initially with Pembrolizumab December 2016, Finished in May 2017 secondary to grade 3 pneumonitis  Initiated on prednisone  Progression: Nivolumab 240 mg flat dose every 2 weeks along with prednisone 10 mg p o  Daily initiated April 2018- October 2018 with excellent response  Liquid biopsy showed K-MADHURI mutation G12C, no evidence of MSI high        Disease progression in August 2018 in the left scapula with pain no new lesions by PET scan  Status post radiation therapy to the left scapula and treated with Alimta 500 milligram/meter squared, carboplatin AUC 5  After 3 cycles,  CT scan in January 2019 showed stable disease  Carbo discontinued 3/2019  Maintenance Alimta 500 milligram/meter squared every 3 weeks initiated 3/2019  Cr 2/20 was 1 5  Plan was to dose reduce Alimta to 400mg/m2; however cr 2 16 2/24/20 and Alimta was held  3/4/20:  renal u/s  Minimal fullness of the left renal collecting system without matthieu hydronephrosis  Chronic right kidney lower pole cortical scar, with adjacent parenchymal calcification measuring 5 mm      She was on prednisone 5 mg p o  daily because of previous history of pneumonitis  CT scan chest 1/3/2020 showed no evidence of infiltration in the lung parenchyma, prednisone was reduced every other day for 1 month and then discontinued  Progression of disease 7/2021  Nivolumab 240 mg flat dose every 3 weeks and carboplatin AUC 5 added to pemetrexed which was dose reduced to 250 milligram/meter squared, initiated 8/2/2021     Progression of disease  Treatment changed to Taxotere 75mg/m2 10/2021  Cyramza added with cycle 2 11/2021  Right basal ganglia and right ischemic CVA on 01/2022 with hospitalization and rehab which may have been secondary to [address] changed to Navelbine 30 mg per m2 every other week since 02/02/2022 8/30/22- CT chest - Enlarged medial right lower lobe spiculated opacity now measuring 3 8 x 1 9 cm previously measured 2 1 x 1 2 suspicious for malignancy  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      9/2022 sotorasib 960 mg in the morning without food initiated with good tolerance  Interval History:   CT chest 11/2022 Continued enlargement of the right lower lobe paraspinal mass  On the coronal series, it was previously 3 8 x 1 9 cm and is now 4 1 x 2 3 cm    He is at baseline  Denies any issues or concerns    Test Results:        Labs:   Lab Results   Component Value Date    HGB 10 5 (L) 01/11/2023    HCT 35 8 01/11/2023    MCV 86 01/11/2023     01/11/2023    WBC 6 38 01/11/2023    NRBC 0 01/11/2023     Lab Results   Component Value Date     11/23/2015    K 4 5 01/11/2023     (H) 01/11/2023    CO2 25 01/11/2023    ANIONGAP 9 11/23/2015    BUN 26 (H) 01/11/2023    CREATININE 1 41 (H) 01/11/2023    GLUCOSE 98 01/03/2022    GLUF 129 (H) 01/11/2023    CALCIUM 9 5 01/11/2023    CORRECTEDCA 10 2 (H) 01/11/2023    AST 14 01/11/2023    ALT 16 01/11/2023    ALKPHOS 104 01/11/2023    PROT 6 8 11/23/2015    BILITOT 0 65 11/23/2015    EGFR 36 01/11/2023       Imaging: No results found  ROS:  As mentioned in HPI & Interval History otherwise 14 point ROS negative  Allergies:    Allergies   Allergen Reactions   • Amoxicillin Hives   • Amoxicillin Rash and Hives   • Cardizem [Diltiazem] Rash     Rash     • Statins Myalgia     Severe muscle aching  Terrible pains   • Zofran [Ondansetron] Palpitations     Current Medications: Reviewed  PMH/FH/SH:  Reviewed      Physical Exam:    There is no height or weight on file to calculate BSA  Ht Readings from Last 3 Encounters:   22 5' 4" (1 626 m)   11/15/22 5' 3 39" (1 61 m)   22 5' 4" (1 626 m)        Wt Readings from Last 3 Encounters:   22 82 6 kg (182 lb)   11/15/22 81 6 kg (180 lb)   22 81 6 kg (180 lb)        Temp Readings from Last 3 Encounters:   22 97 9 °F (36 6 °C) (Tympanic)   11/15/22 (!) 96 4 °F (35 8 °C) (Tympanic)   22 (!) 96 6 °F (35 9 °C) (Temporal)        BP Readings from Last 3 Encounters:   22 112/62   11/15/22 120/60   22 118/58           Physical Exam  Constitutional:       Appearance: She is well-developed  HENT:      Head: Normocephalic and atraumatic  Cardiovascular:      Rate and Rhythm: Normal rate and regular rhythm  Heart sounds: Normal heart sounds  Pulmonary:      Effort: Pulmonary effort is normal  No respiratory distress  Breath sounds: Normal breath sounds  Musculoskeletal:      Cervical back: Neck supple  Skin:     General: Skin is warm and dry  Neurological:      Mental Status: She is alert     Psychiatric:         Behavior: Behavior normal          ECO      Emergency Contacts:    Extended Emergency Contact Information  Primary Emergency Contact: Patel Reinoso  Address: [place] [address]            Eva Phillips, [facility] [place] [id number] [facility] [employer] Phone: [phone]  Relation: Brother  Secondary Emergency Contact: Liz Booth  Address: [address] [place] [address] [employer] Phone: [phone]  Relation: Daughter

## 2023-01-23 ENCOUNTER — TELEPHONE (OUTPATIENT)
Dept: HEMATOLOGY ONCOLOGY | Facility: CLINIC | Age: 73
End: 2023-01-23

## 2023-02-03 ENCOUNTER — TELEPHONE (OUTPATIENT)
Dept: HEMATOLOGY ONCOLOGY | Facility: CLINIC | Age: 73
End: 2023-02-03

## 2023-02-03 NOTE — TELEPHONE ENCOUNTER
Appointment Cancellation Or Reschedule     Person calling in Patient   If someone other than patient calling, are they listed on the communication consent form? N/A   Provider Dr Sophia Santiago   Office Visit Date and Time  02/10/2023 @11:20AM    Office Visit Location [place]   Did patient want to reschedule their office appointment? If so, when was it scheduled to? YES,03/03/2023 @9:20AM    Did you have STAR scheduled for this appointment? No   Do you need STAR set up for your new appointment? If yes, [facility] for [facility] rescheduling No   Is this patient calling to reschedule an infusion appointment? No   When is their next infusion appointment? N/A   Is this patient a Chemo patient? No   Reason for Cancellation or Reschedule scheduling conflict      If the patient is cancelling an appointment and needs their STAR Transport cancelled, please route to [address]  If the patient is a treatment patient, please route this to the office nurse  If the patient is not on treatment, please route to the Clerical pool based on location  If the patient is a surgical oncology patient, please route to surg/onc clinical pool  Route message as high priority if same day cancellation

## 2023-02-11 DIAGNOSIS — C79.52 SECONDARY MALIGNANT NEOPLASM OF BONE AND BONE MARROW (HCC): ICD-10-CM

## 2023-02-11 DIAGNOSIS — Z51.5 PALLIATIVE CARE PATIENT: ICD-10-CM

## 2023-02-11 DIAGNOSIS — C34.90 ADENOCARCINOMA OF LUNG, STAGE 4, UNSPECIFIED LATERALITY (HCC): ICD-10-CM

## 2023-02-11 DIAGNOSIS — F32.5 MAJOR DEPRESSIVE DISORDER WITH SINGLE EPISODE, IN FULL REMISSION (HCC): ICD-10-CM

## 2023-02-11 DIAGNOSIS — C79.51 SECONDARY MALIGNANT NEOPLASM OF BONE AND BONE MARROW (HCC): ICD-10-CM

## 2023-02-11 DIAGNOSIS — F41.9 ANXIETY: ICD-10-CM

## 2023-02-12 RX ORDER — BUSPIRONE HYDROCHLORIDE 7.5 MG/1
TABLET ORAL
Qty: 180 TABLET | Refills: 0 | Status: SHIPPED | OUTPATIENT
Start: 2023-02-12

## 2023-02-13 ENCOUNTER — OFFICE VISIT (OUTPATIENT)
Dept: PALLIATIVE MEDICINE | Facility: CLINIC | Age: 73
End: 2023-02-13

## 2023-02-13 VITALS
OXYGEN SATURATION: 97 % | SYSTOLIC BLOOD PRESSURE: 110 MMHG | DIASTOLIC BLOOD PRESSURE: 58 MMHG | HEART RATE: 69 BPM | TEMPERATURE: 97.6 F | BODY MASS INDEX: 30.9 KG/M2 | WEIGHT: 180 LBS

## 2023-02-13 DIAGNOSIS — F32.5 MAJOR DEPRESSIVE DISORDER WITH SINGLE EPISODE, IN FULL REMISSION (HCC): ICD-10-CM

## 2023-02-13 DIAGNOSIS — G62.0 NEUROPATHY DUE TO CHEMOTHERAPEUTIC DRUG (HCC): ICD-10-CM

## 2023-02-13 DIAGNOSIS — K59.00 CONSTIPATION: ICD-10-CM

## 2023-02-13 DIAGNOSIS — F41.9 ANXIETY: ICD-10-CM

## 2023-02-13 DIAGNOSIS — C34.90 ADENOCARCINOMA OF LUNG, STAGE 4, UNSPECIFIED LATERALITY (HCC): Primary | ICD-10-CM

## 2023-02-13 DIAGNOSIS — K21.9 GASTROESOPHAGEAL REFLUX DISEASE WITHOUT ESOPHAGITIS: ICD-10-CM

## 2023-02-13 DIAGNOSIS — T45.1X5A NEUROPATHY DUE TO CHEMOTHERAPEUTIC DRUG (HCC): ICD-10-CM

## 2023-02-13 DIAGNOSIS — Z86.73 HISTORY OF STROKE: ICD-10-CM

## 2023-02-13 DIAGNOSIS — I50.32 CHRONIC DIASTOLIC HEART FAILURE (HCC): ICD-10-CM

## 2023-02-13 DIAGNOSIS — G89.3 CANCER RELATED PAIN: Chronic | ICD-10-CM

## 2023-02-13 DIAGNOSIS — C79.52 SECONDARY MALIGNANT NEOPLASM OF BONE AND BONE MARROW (HCC): ICD-10-CM

## 2023-02-13 DIAGNOSIS — Z51.5 PALLIATIVE CARE PATIENT: ICD-10-CM

## 2023-02-13 DIAGNOSIS — C79.51 SECONDARY MALIGNANT NEOPLASM OF BONE AND BONE MARROW (HCC): ICD-10-CM

## 2023-02-13 RX ORDER — VENLAFAXINE 37.5 MG/1
75 TABLET ORAL
Qty: 180 TABLET | Refills: 3
Start: 2023-02-13

## 2023-02-13 NOTE — Clinical Note
Counseled that the patient's aunt may benefit from Geriatrics / [facility] eval for vascular dementia / sundowning  Roger Mead would be more appropriate for her aunt for eval and management; Ken Santos would only really be appropriate for goals  This is for patient's aunt, not for Fuchs Sharla herself (patient asked for guidance today)

## 2023-02-13 NOTE — PATIENT INSTRUCTIONS
It was good to see you today  Thank you for coming in  Consider assistive devices (cane, walker) when ambulating to reduce pain and reduce risk of fall  Tylenol, 1000mg three times per day every day, can be a safe and effective way to reduce chronic pain  No interactions w/ Lumakras per database  Increase Effexor (venlafaxine) to 75mg in the evening; take two tablets at the same time  If helpful, call us and let us know so we can update the prescription  Good luck with the CT scan! If constipation is an issue:  Drink PLENTY of water  This is important to keep the gut moving  Some people have success w/ using prunes, prune juice, certain fruits or vegetables (apples, bananas, prunes, pears, raspberries, and vegetables like string beans, broccoli, spinach, kale, squash, lentils, peas, and beans), or fiber gummies  Try a probiotic  This could be yogurt or kefir, or fermented beverages such as kombucha, but probiotics are also available in capsule form  Aim for 10-15 billion colony-forming-units, w/ bacteria such as Lactobacillus / Saccharomyces / Actinomyces  Osmotic laxatives (Miralax, magnesium citrate, Milk of Magnesia) can be very useful for opioid-induced constipation (OIC); take daily to prevent OIC  Bulk laxatives (Citrucel, Metamucil, Fibercon, Benefiber, wheat germ) are useful for constipation in patients who are not taking opioids, but are not recommended if you are taking opioids  Colace is good for softening hard stools, or preventing constipation when opioids are being used - but does not stimulate the bowel to move things along once constipation has occurred  You can use senna, 1 to 2 tabs, once or twice daily as needed for constipation  Use as directed on the box/bottle  Senna is also available in a tea ("Smooth Move")  Should that not be enough for your constipation, you can try Dulcolax  Should that not be enough, consider an enema    All of these medications are available over-the-counter  Ask Dr Osito Garay to refer your aunt to Geriatrics or Geriatric Psychiatry for management of vascular / dementia (sundowning due to stroke)  Return in about 3 months  Call us for refills on medications that we supply, as needed  If something changes and you need to come in sooner, please call our office  PRESCRIPTION REFILL REMINDER:  All medication refills should be requested prior to [facility] on Friday  Any refill requests after noon on Friday would be addressed the following Monday  MEDICATION SAFETY ISSUES:  Do not drive under the influence of narcotics (including opioids), watch for adverse effects including confusion / altered mental status / respiratory depression (slowed breathing), keep medications stored in a safe/locked environment, do not use alcohol while opioids or other narcotics are in your system

## 2023-02-13 NOTE — PROGRESS NOTES
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)    3  Chronic diastolic heart failure ([place] [address] )    4  History of stroke    5  Major depressive disorder with single episode, in full remission ([place] [address] )    6  Anxiety    7  Gastroesophageal reflux disease without esophagitis    8  Cancer related pain    9  Constipation    10  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[place] [address] )    11  Palliative care patient          • Today patient endorses more depressed mood than baseline  Anxiety is baseline/managed  o Continue buspirone to 7 5mg TID  Was increased top TID as sotorazib lowers serum concentrations of buspirone   o Increase venlafaxine to 75mg QHS; asked patient to call in 1 month w/ response, we may continue or make further adjustment  She has sufficient supply of the 37 5mg tabs for now   o She declines offer of psychotherapy  • Continue omeprazole for reflux  effective  • Patient endorses non-malignant knee pain  No significant cancer-related pain   o May continue Tylenol PRN  o No interactions between sotorasib and Tylenol   o Has not used nor wanted opioids for her non-malignant chronic pain  o She has a walker at home which she uses in the AM to reduce knee pain  • Encouraged regular cane or walker use to decrease knee pain and reduce fall risk  • Counseled on bowel regimen for constipation  • Continue disease-directed cares  • Counseled that the patient's aunt may benefit from Geriatrics / [facility] eval for vascular dementia / sundowning  • ACP: POA + POLST on file; reviewed today  • Patient has received [employer] vaccinations  • Reviewed notes (Medical Oncology, PCP), labs (1/11/23 Cr 1 41, eGFR 36, BUN 26, Cl 110, cCa 10 2, alb 3 1, Hb 10 5), imaging (11/14/22 CT chest)    Return in about 3 months (around 5/13/2023)  • Emotional support provided  • Medication safety issues addressed - no driving under the influence of narcotics (including opioids), watch for adverse effects including AMS or respiratory depression (slowed breathing), keep medications stored in a safe/locked environment, do not use alcohol while opioids or other narcotics are in one's system  Requested Prescriptions     Signed Prescriptions Disp Refills   • venlafaxine (EFFEXOR) 37 5 mg tablet 180 tablet 3     Sig: Take 2 tablets (75 mg total) by mouth daily at bedtime       Medications Discontinued During This Encounter   Medication Reason   • venlafaxine (EFFEXOR) 37 5 mg tablet Reorder       Representatives have queried the patient's controlled substance dispensing history in the Prescription Drug Monitoring Program in compliance with regulations before I have prescribed any controlled substances  The prescription history is consistent with prescribed therapy and our practice policies  30+ minutes were spent in this ambulatory visit with greater than 50% of the time spent face to face with patient in counseling or coordination of care including symptom assessment and management, medication review, medication adjustment, psychosocial support, chart review, imaging review, lab review, advanced directives, supportive listening and anticipatory guidance  All of the patient's questions were answered during this discussion  SUBJECTIVE:  Chief Complaint   Patient presents with   • Cancer   • Pain   • Anxiety   • Depression   • Counseling   • Follow-up        HPI    Elise Das is a 74 y o  female w/ stage IV non-small cell lung cancer (diagnosed 2016) w/ osseous metastasis s/p chemotherapy + immunotherapy + RT, DM2, HFpEF, Afib, CKD, HTN+HLD, h/o CVA  She follows w/ Sheila Vera PA-C + Dr Subhash Rahman (Medical Oncology), [facility]  Plan includes systemic therapy w/ sotorazib      Patient is known to [facility] clinic; seen 11/14/22 for symptom assessment and management, medication review, medication adjustment, psychosocial support, chart review, imaging review, lab review, goals of care, supportive listening and anticipatory guidance  Patient endorses ongoing chronic L knee pain which can interfere w/ her QOL  She states she needs surgery for non-malignancy-related knee issues but was told surgery was not an option d/t her other health issues  She will occasionally use a walker in the AM to offload the knee when ambulating  Patient states, "I may be depressed"  She has had chronic mood issues, and last visit was more concerned about anxiousness, and potential interactions between her immunotherapy and her mood medications  Her anxiousness has improved since Buspar frequency was increased to TID  Patient asks about her aunt, describing activity which resembles sundowning from possible vascular dementia (she may have had a stroke)  See ROS for additional symptomatology  PDMP shows no concerns  The following portions of the medical history were reviewed: past medical history, surgical history, problem list, medication list, family history, and social history  Current Outpatient Medications:   •  acetaminophen (TYLENOL) 325 mg tablet, Take 650 mg by mouth every 6 (six) hours as needed for mild pain, Disp: , Rfl:   •  apixaban (Eliquis) 5 mg, TAKE 1 TABLET TWICE A DAY, Disp: 180 tablet, Rfl: 3  •  aspirin (ECOTRIN LOW STRENGTH) 81 mg EC tablet, Take 1 tablet (81 mg total) by mouth daily, Disp: 30 tablet, Rfl: 0  •  atorvastatin (LIPITOR) 40 mg tablet, TAKE 1 TABLET BY MOUTH EVERY DAY, Disp: 90 tablet, Rfl: 1  •  busPIRone (BUSPAR) 7 5 mg tablet, TAKE 1 TABLET 3 TIMES A    DAY   (INCREASING TO 3 TIMESA DAY), Disp: 180 tablet, Rfl: 0  •  cyanocobalamin (VITAMIN B-12) 100 mcg tablet, Take by mouth daily, Disp: , Rfl:   •  folic acid (FOLVITE) 1 mg tablet, Take 1 tablet (1 mg total) by mouth daily, Disp: 90 tablet, Rfl: 1  •  linaGLIPtin (Tradjenta) 5 MG TABS, Take 5 mg by mouth daily, Disp: 90 tablet, Rfl: 3  •  metFORMIN (GLUCOPHAGE) 500 mg tablet, Take 1 tablet (500 mg total) by mouth 2 (two) times a day with meals (Patient taking differently: Take 1,000 mg by mouth 2 (two) times a day with meals), Disp: 180 tablet, Rfl: 3  •  metoprolol tartrate (LOPRESSOR) 25 mg tablet, Take 1 tablet (25 mg total) by mouth every 12 (twelve) hours, Disp: 180 tablet, Rfl: 3  •  omeprazole (PriLOSEC) 20 mg delayed release capsule, Take 1 capsule (20 mg total) by mouth daily, Disp: 90 capsule, Rfl: 3  •  sotalol (BETAPACE) 80 mg tablet, Take 1 tablet (80 mg total) by mouth 2 (two) times a day, Disp: 180 tablet, Rfl: 3  •  Sotorasib 120 MG TABS, Take 960 mg by mouth daily, Disp: 240 tablet, Rfl: 5  •  valsartan (DIOVAN) 160 mg tablet, TAKE 1 TABLET BY MOUTH TWICE A DAY, Disp: 180 tablet, Rfl: 1  •  venlafaxine (EFFEXOR) 37 5 mg tablet, Take 2 tablets (75 mg total) by mouth daily at bedtime, Disp: 180 tablet, Rfl: 3  •  furosemide (LASIX) 20 mg tablet, TAKE 1 TABLET (20 MG TOTAL) BY MOUTH DAILY AS NEEDED (LEG EDEMA) (Patient not taking: Reported on 2/13/2023), Disp: 90 tablet, Rfl: 1  •  lidocaine-prilocaine (EMLA) cream, Apply to port site 30 minutes prior to infusion and cover with a dressing, Disp: 30 g, Rfl: 1  •  loperamide (IMODIUM) 2 mg capsule, Take 2 mg by mouth 4 (four) times a day as needed for diarrhea   (Patient not taking: Reported on 2/13/2023), Disp: , Rfl:   •  loratadine (CLARITIN) 10 mg tablet, Take 10 mg by mouth as needed (Patient not taking: Reported on 2/13/2023), Disp: , Rfl:   •  potassium chloride (Klor-Con M10) 10 mEq tablet, Take 1 tablet (10 mEq total) by mouth daily (Patient not taking: Reported on 2/13/2023), Disp: 90 tablet, Rfl: 3    Review of Systems   Constitutional: Positive for appetite change ("I feel full longer")  Negative for unexpected weight change (defending her weight)     Respiratory:        "I hear a click when I take a deep breath"  Cardiovascular: Negative for leg swelling  Gastrointestinal: Positive for constipation  Negative for nausea and vomiting  Musculoskeletal: Positive for arthralgias  Psychiatric/Behavioral: Positive for dysphoric mood  The patient is nervous/anxious (stable)  All other systems reviewed and are negative  OBJECTIVE:  /58 (BP Location: Left arm, Patient Position: Sitting, Cuff Size: Standard)   Pulse 69   Temp 97 6 °F (36 4 °C) (Temporal)   Wt 81 6 kg (180 lb)   LMP  (LMP Unknown)   SpO2 97%   BMI 30 90 kg/m²   Physical Exam  Vitals reviewed  Constitutional:       General: She is not in acute distress  Appearance: She is well-developed, well-groomed and overweight  She is not toxic-appearing  HENT:      Head: Normocephalic and atraumatic  Right Ear: External ear normal       Left Ear: External ear normal    Eyes:      General: No scleral icterus  Right eye: No discharge  Left eye: No discharge  Extraocular Movements: Extraocular movements intact  Conjunctiva/sclera: Conjunctivae normal       Pupils: Pupils are equal, round, and reactive to light  Pulmonary:      Effort: Pulmonary effort is normal  No tachypnea, bradypnea, accessory muscle usage or respiratory distress  Abdominal:      General: There is no distension  Tenderness: There is no guarding  Musculoskeletal:      Cervical back: Normal range of motion  Right lower leg: No edema  Left lower leg: No edema  Skin:     General: Skin is dry  Coloration: Skin is not pale  Neurological:      Mental Status: She is alert and oriented to person, place, and time  Cranial Nerves: No dysarthria or facial asymmetry  Gait: Gait is intact     Psychiatric:         Attention and Perception: Attention normal          Mood and Affect: Mood and affect normal          Speech: Speech normal          Behavior: Behavior normal  Behavior is cooperative  Thought Content: Thought content normal          Cognition and Memory: Cognition and memory normal          Judgment: Judgment normal           Beverly Alvarado MD  [facility]iative [facility]      Portions of this document may have been created using dictation software and as such some "sound alike" terms may have been generated by the system  Do not hesitate to contact me with any questions or clarifications

## 2023-02-16 ENCOUNTER — HOSPITAL ENCOUNTER (OUTPATIENT)
Dept: CT IMAGING | Facility: HOSPITAL | Age: 73
Discharge: HOME | End: 2023-02-16

## 2023-02-16 DIAGNOSIS — C34.12 MALIGNANT NEOPLASM OF UPPER LOBE OF LEFT LUNG (HCC): ICD-10-CM

## 2023-02-16 DIAGNOSIS — C34.90 ADENOCARCINOMA OF LUNG, STAGE 4, UNSPECIFIED LATERALITY (HCC): ICD-10-CM

## 2023-02-27 ENCOUNTER — TELEPHONE (OUTPATIENT)
Dept: CARDIOLOGY CLINIC | Facility: CLINIC | Age: 73
End: 2023-02-27

## 2023-03-03 ENCOUNTER — OFFICE VISIT (OUTPATIENT)
Dept: HEMATOLOGY ONCOLOGY | Facility: CLINIC | Age: 73
End: 2023-03-03

## 2023-03-03 VITALS
WEIGHT: 180 LBS | HEART RATE: 71 BPM | BODY MASS INDEX: 30.73 KG/M2 | TEMPERATURE: 96.8 F | OXYGEN SATURATION: 98 % | DIASTOLIC BLOOD PRESSURE: 72 MMHG | HEIGHT: 64 IN | RESPIRATION RATE: 16 BRPM | SYSTOLIC BLOOD PRESSURE: 118 MMHG

## 2023-03-03 DIAGNOSIS — C34.92 ADENOCARCINOMA OF LEFT LUNG, STAGE 4 (HCC): Primary | ICD-10-CM

## 2023-03-03 NOTE — PROGRESS NOTES
Hematology Outpatient Follow - Up Note  Philip Griffith 74 y o  female MRN: @ Encounter: [id number]        Date:  3/3/2023        Assessment/ Plan:    Stage 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 of the lung with left scapula involvement diagnosed on 08/2016 PDL expression more than 50%, negative for EGFR mutation, ALK  rearrangement, Ros1 mutation     Treated initially with Pembrolizumab complicated with pneumonitis and later on nivolumab with prednisone 10 mg p o   Daily with excellent response      2   Disease progression in August 2018 in the left scapula with pain no new lesions by PET scan   Status post radiation therapy to the left scapula and treated with Alimta 500 milligram/meter squared, carboplatin AUC 5     After 3 cycles,  CT scan in January 2019 showed stable disease, and she was placed on maintenance Alimta 500 milligram/meter squared every 3 weeks         Alimta dose was reduced to 400 milligram/meter IV due to elevated Creatinine and  then Pemetrexed dose was reduced to 300 milligram/meter squared every 4 weeks    Cycle #38  Alimta was 6/22/21          3   Progression of disease 7/2021         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%   (MAP2K1 mutations are mutually exclusive with BRAF mutations)  There are FDA approved therapies indicated for other disease states such as binimetinib, cobimetinib, selumetinib, trametinib   Given the very small (0 1%) DNA amplification, use of these medications off label are likely to offer minimal benefit        Nivolumab 240 mg flat dose every 3 weeks and  carboplatin AUC 5 added to  pemetrexed which was dose reduced to 250 milligram/meter squared, initiated 8/2/2021     4   Progression of disease   Treatment changed to Taxotere 75mg/m2 10/2021  Alla Frye added with cycle 2 11/2021      5   Right basal ganglia and right ischemic CVA on 01/2022 with hospitalization and rehab which may have been secondary to Cyramza     6   Therapy changed to Navelbine 30 mg per m2 every other week since 02/02/2022         7   Chemo induced anemia   Hemoglobin 9 9 6/20/22 compared to 12 3 4/2022;  10 6 5/2022      8   CKD 1  4 - 1 6     9   Atrial fibrillation/ history of CVA on Eliquis     10   Progression of disease   8/30/22- CT chest - Enlarged medial right lower lobe spiculated opacity now measuring 3 8 x 1 9 cm previously measured 2 1 x 1 2 suspicious for malignancy  D/W Dr Camryn De León  This appears to be in field where she had RT to T9-T11 in 2016      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, treatment plan change treatment to Lumakrans (sotorasib)    960 mg p o  daily in the morning without food    We reviewed the CAT scan on 2/2023 showed minimal enlargement no new lesions, there might be fracture of the anterior fourth rib on the left side not reported on the CAT scan    At this time we will continue treatment and follow-up in 2 months with CBC, CMP        Labs and imaging studies are reviewed by ordering provider once results are available  If there are findings that need immediate attention, you will be contacted when results available  Discussing results and the implication on your healthcare is best discussed in person at your follow-up visit  HPI:    Kenny Oleary was admitted to the hospital with arrhythmia and was found to have right lower lobe infiltrate in August 2016   She was treated with antibiotics however repeat chest x-ray showed persistent right lower lobe infiltrate  Subsequently the patient had a CT scan of the chest which showed a right perihilar mass, subcarinal lymphadenopathy, lytic lesion of the right costovertebral junction at T10 level   PET scan October 2016 showed a right perihilar mass measuring 3 5 cm with SUV of 8 9, subcarinal lymph nodes measuring 3 4 x 2 2 cm with SUV of 9 2   Nodule in the left upper lobe lung measured 2 x 1 1 cm   There was a lytic lesion involving the right 10th costovertebral junction with SUV of 14  4      Biopsy showed non-small cell carcinoma with features suggesting of adenocarcinoma positive for CK 7, CK 19, CA-19-9, ANEUDY-3, partially positive for P40, p63, negative for TTF-1   She had a history of uterine cancer in 2000 status post hysterectomy and did not require radiation or chemotherapy   She is status post bilateral oophorectomy, right knee replacement,   tonsillectomy       She used to smoke for 35 years 1 pack per day however quit smoking 21 years ago   She used hormonal replacement therapy for 3 years  Shruti Brewer has a family history significant for skin cancer in her father and coronary artery disease in mother  Shruti Brewer has 2 healthy children      Treated initially with Pembrolizumab December 2016, Finished in May 2017 secondary to grade 3 pneumonitis  Initiated on prednisone     Progression: Nivolumab 240 mg flat dose every 2 weeks along with prednisone 10 mg p o  Daily initiated April 2018- October 2018 with excellent response      Liquid biopsy showed K-MADHURI mutation G12C, no evidence of MSI high        Disease progression in August 2018 in the left scapula with pain no new lesions by PET scan   Status post radiation therapy to the left scapula and treated with Alimta 500 milligram/meter squared, carboplatin AUC 5   After 3 cycles,  CT scan in January 2019 showed stable disease  Carbo discontinued 3/2019    Maintenance Alimta 500 milligram/meter squared every 3 weeks initiated 3/2019        Cr 2/20 was 1 5   Plan was to dose reduce Alimta to 400mg/m2; however cr 2 16 2/24/20 and Alimta was held       3/4/20:  renal u/s  Minimal fullness of the left renal collecting system without matthieu hydronephrosis     Chronic right kidney lower pole cortical scar, with adjacent parenchymal calcification measuring 5 mm      She was on prednisone 5 mg p o  daily because of previous history of pneumonitis  CT scan chest 1/3/2020 showed no evidence of infiltration in the lung parenchyma, prednisone was reduced every other day for 1 month and then discontinued        Progression of disease 7/2021        Nivolumab 240 mg flat dose every 3 weeks and carboplatin AUC 5 added to pemetrexed which was dose reduced to 250 milligram/meter squared, initiated 8/2/2021     Progression of disease   Treatment changed to Taxotere 75mg/m2 10/2021  Go Desanctis added with cycle 2 11/2021      Right basal ganglia and right ischemic CVA on 01/2022 with hospitalization and rehab which may have been secondary to [address] changed to Navelbine 30 mg per m2 every other week since 02/02/2022 8/30/22- CT chest - Enlarged medial right lower lobe spiculated opacity now measuring 3 8 x 1 9 cm previously measured 2 1 x 1 2 suspicious for malignancy         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        9/2022 sotorasib 960 mg in the morning without food initiated with good tolerance    CT scan on 2/2023 showed minimal enlargement however no new lesions  Interval History:        Previous Treatment:         Test Results:    Imaging: CT chest wo contrast    Result Date: 2/27/2023  Narrative: CT CHEST WITHOUT IV CONTRAST INDICATION:   C34 12: Malignant neoplasm of upper lobe, left bronchus or lung C34 90: Malignant neoplasm of unspecified part of unspecified bronchus or lung  Stage IV adenocarcinoma of the left upper lobe with left scapular involvement diagnosed in August 2016  Treated initially with Pembrolizumab complicated with pneumonitis and later on nivolumab with excellent response  Disease progression August 2018 in the left scapula, treated with radiation  COMPARISON:  February 16, 2023 TECHNIQUE: CT examination of the chest was performed without intravenous contrast  Axial, sagittal, and coronal 2D reformatted images were created from the source data and submitted for interpretation  Radiation dose length product (DLP) for this visit:  406 mGy-cm     This examination, like all CT scans performed in the [facility], was performed utilizing techniques to minimize radiation dose exposure, including the use of iterative reconstruction and automated exposure control  FINDINGS: LUNGS:  There has been very slight interval enlargement of right lower lobe paraspinal mass since November 14, 2022  On the coronal series, it was 3 8 x 1 9 cm on April 18, 2022, 4 1 x 2 3 cm on November 14, 2022, and now measures approximately 4 1 x 2 8 cm on image 84 of series 601  On the axial series, the mass measured 2 9 x 1 8 cm on April 18, 2022, 3 8 x 2 1 cm on November 14, 2022, and now measures 4 2 x 2 2 cm on image 183 of series 3  Stable radiation fibrosis in the left upper lobe, unchanged  Unchanged scarring along the undersurface of the minor fissure in the right middle lobe laterally  Unchanged 9 mm nodule abutting the left pericardium since at least September 2021 on image 133 of series 3  AIRWAYS: No significant filling defects  PLEURA:  Unremarkable  HEART/GREAT VESSELS:  Normal heart size  Mild coronary artery calcification indicating atherosclerotic heart disease  Right port at cavoatrial junction  MEDIASTINUM AND ANABEL:  Small sliding-type hiatal hernia reidentified  Lower posterior mediastinal paraesophageal node measuring 8 x 4 mm on image 91 of series 2 subtly more prominent since previous examination  No mediastinal or hilar lymphadenopathy  CHEST WALL AND LOWER NECK: Unremarkable  UPPER ABDOMEN:  Hepatic cysts  Cholecystectomy  Redemonstration of 2 8 cm cystic lesion in the distal body of the pancreas  OSSEOUS STRUCTURES: Reidentified pathologic fracture of the anterolateral left 3rd rib (3/39), with subtle lucency in this rib visible only in retrospect in August 2022  Sclerosis in the left scapula and in the trabecular anterior left 2nd rib most consistent with the sequela of radiation therapy  Mild degenerative disease in the spine       Impression: Very slight continued enlargement of right lower lobe paraspinal mass since previous examination  Otherwise no significant interval change since previous examination  Workstation performed: [id number]       Labs:   Lab Results   Component Value Date    WBC 6 38 01/11/2023    HGB 10 5 (L) 01/11/2023    HCT 35 8 01/11/2023    MCV 86 01/11/2023     01/11/2023     Lab Results   Component Value Date     11/23/2015    K 4 5 01/11/2023     (H) 01/11/2023    CO2 25 01/11/2023    ANIONGAP 9 11/23/2015    BUN 26 (H) 01/11/2023    CREATININE 1 41 (H) 01/11/2023    GLUCOSE 98 01/03/2022    GLUF 129 (H) 01/11/2023    CALCIUM 9 5 01/11/2023    CORRECTEDCA 10 2 (H) 01/11/2023    AST 14 01/11/2023    ALT 16 01/11/2023    ALKPHOS 104 01/11/2023    PROT 6 8 11/23/2015    BILITOT 0 65 11/23/2015    EGFR 36 01/11/2023       Lab Results   Component Value Date    IRON 79 02/16/2022    TIBC 320 02/16/2022    FERRITIN 49 02/16/2022       No results found for: [id number]      ROS: Review of Systems   Constitutional: Negative for appetite change, chills, diaphoresis, fatigue and unexpected weight change  HENT:   Negative for mouth sores, nosebleeds, sore throat, trouble swallowing and voice change  Eyes: Negative for eye problems and icterus  Respiratory: Negative for chest tightness, cough, hemoptysis and wheezing  Cardiovascular: Negative for chest pain, leg swelling and palpitations  Gastrointestinal: Negative for abdominal distention, abdominal pain, blood in stool, constipation, diarrhea, nausea and vomiting  Endocrine: Negative for hot flashes  Genitourinary: Negative for bladder incontinence, difficulty urinating, dyspareunia, dysuria and frequency  Musculoskeletal: Negative for arthralgias, back pain, gait problem, neck pain and neck stiffness  Skin: Negative for itching and rash  Neurological: Negative for dizziness, gait problem, headaches, numbness, seizures and speech difficulty  Hematological: Negative for adenopathy  Does not bruise/bleed easily  Psychiatric/Behavioral: Negative for decreased concentration, depression, sleep disturbance and suicidal ideas  The patient is not nervous/anxious  Current Medications: Reviewed  Allergies: Reviewed  PMH/FH/SH:  Reviewed      Physical Exam:    Body surface area is 1 87 meters squared  Wt Readings from Last 3 Encounters:   03/03/23 81 6 kg (180 lb)   02/13/23 81 6 kg (180 lb)   01/20/23 81 6 kg (180 lb)        Temp Readings from Last 3 Encounters:   03/03/23 (!) 96 8 °F (36 °C) (Temporal)   02/13/23 97 6 °F (36 4 °C) (Temporal)   01/20/23 97 6 °F (36 4 °C)        BP Readings from Last 3 Encounters:   03/03/23 118/72   02/13/23 110/58   01/20/23 130/60         Pulse Readings from Last 3 Encounters:   03/03/23 71   02/13/23 69   01/20/23 60        Physical Exam  Vitals reviewed  Constitutional:       General: She is not in acute distress  Appearance: She is well-developed  She is not diaphoretic  HENT:      Head: Normocephalic and atraumatic  Eyes:      Conjunctiva/sclera: Conjunctivae normal    Neck:      Trachea: No tracheal deviation  Cardiovascular:      Rate and Rhythm: Normal rate and regular rhythm  Heart sounds: No murmur heard  No friction rub  No gallop  Pulmonary:      Effort: Pulmonary effort is normal  No respiratory distress  Breath sounds: Normal breath sounds  No wheezing or rales  Chest:      Chest wall: No tenderness  Abdominal:      General: There is no distension  Palpations: Abdomen is soft  Tenderness: There is no abdominal tenderness  Musculoskeletal:      Cervical back: Normal range of motion and neck supple  Right lower leg: No edema  Left lower leg: No edema  Lymphadenopathy:      Cervical: No cervical adenopathy  Skin:     General: Skin is warm and dry  Coloration: Skin is not pale  Findings: No erythema  Neurological:      Mental Status: She is alert and oriented to person, place, and time     Psychiatric: Behavior: Behavior normal          Thought Content: Thought content normal          Judgment: Judgment normal          ECO    Goals and Barriers:  Current Goal: Minimize effects of disease  Barriers: None  Patient's Capacity to Self Care:  Patient is able to self care      Code Status: [unfilled]

## 2023-03-15 ENCOUNTER — TELEPHONE (OUTPATIENT)
Dept: CARDIOLOGY CLINIC | Facility: CLINIC | Age: 73
End: 2023-03-15

## 2023-03-15 NOTE — TELEPHONE ENCOUNTER
Aida galeas, [id number]  I have a dental appointment tomorrow and just needed to know whether or not I have to stop taking my ELIQUIS before I go  That's it  Thank you very much  My phone number is [phone] and I have doctor Clover  Thank you

## 2023-03-15 NOTE — TELEPHONE ENCOUNTER
Called patient back she is scheduled for a cleaning tomorrow   I advised to continue all medications

## 2023-03-21 DIAGNOSIS — I48.91 ATRIAL FIBRILLATION, UNSPECIFIED TYPE (HCC): ICD-10-CM

## 2023-03-27 DIAGNOSIS — E11.9 CONTROLLED TYPE 2 DIABETES MELLITUS WITHOUT COMPLICATION, WITHOUT LONG-TERM CURRENT USE OF INSULIN (HCC): ICD-10-CM

## 2023-03-27 RX ORDER — LINAGLIPTIN 5 MG/1
5 TABLET, FILM COATED ORAL DAILY
Qty: 90 TABLET | Refills: 3 | Status: SHIPPED | OUTPATIENT
Start: 2023-03-27

## 2023-03-28 DIAGNOSIS — I48.91 ATRIAL FIBRILLATION, UNSPECIFIED TYPE (HCC): ICD-10-CM

## 2023-03-28 RX ORDER — SOTALOL HYDROCHLORIDE 80 MG/1
80 TABLET ORAL 2 TIMES DAILY
Qty: 180 TABLET | Refills: 3 | Status: SHIPPED | OUTPATIENT
Start: 2023-03-28

## 2023-04-06 ENCOUNTER — APPOINTMENT (OUTPATIENT)
Dept: LAB | Facility: AMBULARY SURGERY CENTER | Age: 73
End: 2023-04-06

## 2023-04-06 DIAGNOSIS — I10 PRIMARY HYPERTENSION: ICD-10-CM

## 2023-04-06 DIAGNOSIS — E78.5 DYSLIPIDEMIA: ICD-10-CM

## 2023-04-06 DIAGNOSIS — C34.92 ADENOCARCINOMA OF LEFT LUNG, STAGE 4 (HCC): ICD-10-CM

## 2023-04-06 DIAGNOSIS — E11.9 TYPE 2 DIABETES MELLITUS WITHOUT COMPLICATION, WITHOUT LONG-TERM CURRENT USE OF INSULIN (HCC): ICD-10-CM

## 2023-04-06 LAB
ALBUMIN SERPL BCP-MCNC: 3.2 G/DL (ref 3.5–5)
ALP SERPL-CCNC: 91 U/L (ref 46–116)
ALT SERPL W P-5'-P-CCNC: 21 U/L (ref 12–78)
ANION GAP SERPL CALCULATED.3IONS-SCNC: 3 MMOL/L (ref 4–13)
AST SERPL W P-5'-P-CCNC: 23 U/L (ref 5–45)
BASOPHILS # BLD AUTO: 0.07 THOUSANDS/ÂΜL (ref 0–0.1)
BASOPHILS NFR BLD AUTO: 1 % (ref 0–1)
BILIRUB SERPL-MCNC: 0.43 MG/DL (ref 0.2–1)
BUN SERPL-MCNC: 25 MG/DL (ref 5–25)
CALCIUM ALBUM COR SERPL-MCNC: 10.1 MG/DL (ref 8.3–10.1)
CALCIUM SERPL-MCNC: 9.5 MG/DL (ref 8.3–10.1)
CHLORIDE SERPL-SCNC: 109 MMOL/L (ref 96–108)
CHOLEST SERPL-MCNC: 141 MG/DL
CO2 SERPL-SCNC: 23 MMOL/L (ref 21–32)
CREAT SERPL-MCNC: 1.41 MG/DL (ref 0.6–1.3)
CREAT UR-MCNC: 144 MG/DL
EOSINOPHIL # BLD AUTO: 0.2 THOUSAND/ÂΜL (ref 0–0.61)
EOSINOPHIL NFR BLD AUTO: 3 % (ref 0–6)
ERYTHROCYTE [DISTWIDTH] IN BLOOD BY AUTOMATED COUNT: 14.9 % (ref 11.6–15.1)
EST. AVERAGE GLUCOSE BLD GHB EST-MCNC: 140 MG/DL
GFR SERPL CREATININE-BSD FRML MDRD: 36 ML/MIN/1.73SQ M
GLUCOSE P FAST SERPL-MCNC: 128 MG/DL (ref 65–99)
HBA1C MFR BLD: 6.5 %
HCT VFR BLD AUTO: 36.5 % (ref 34.8–46.1)
HDLC SERPL-MCNC: 55 MG/DL
HGB BLD-MCNC: 11 G/DL (ref 11.5–15.4)
IMM GRANULOCYTES # BLD AUTO: 0.03 THOUSAND/UL (ref 0–0.2)
IMM GRANULOCYTES NFR BLD AUTO: 0 % (ref 0–2)
LDLC SERPL CALC-MCNC: 63 MG/DL (ref 0–100)
LYMPHOCYTES # BLD AUTO: 0.76 THOUSANDS/ÂΜL (ref 0.6–4.47)
LYMPHOCYTES NFR BLD AUTO: 11 % (ref 14–44)
MCH RBC QN AUTO: 26.1 PG (ref 26.8–34.3)
MCHC RBC AUTO-ENTMCNC: 30.1 G/DL (ref 31.4–37.4)
MCV RBC AUTO: 87 FL (ref 82–98)
MICROALBUMIN UR-MCNC: 35.5 MG/L (ref 0–20)
MICROALBUMIN/CREAT 24H UR: 25 MG/G CREATININE (ref 0–30)
MONOCYTES # BLD AUTO: 0.4 THOUSAND/ÂΜL (ref 0.17–1.22)
MONOCYTES NFR BLD AUTO: 6 % (ref 4–12)
NEUTROPHILS # BLD AUTO: 5.43 THOUSANDS/ÂΜL (ref 1.85–7.62)
NEUTS SEG NFR BLD AUTO: 79 % (ref 43–75)
NRBC BLD AUTO-RTO: 0 /100 WBCS
PLATELET # BLD AUTO: 217 THOUSANDS/UL (ref 149–390)
PMV BLD AUTO: 11.4 FL (ref 8.9–12.7)
POTASSIUM SERPL-SCNC: 4.6 MMOL/L (ref 3.5–5.3)
PROT SERPL-MCNC: 6.7 G/DL (ref 6.4–8.4)
RBC # BLD AUTO: 4.22 MILLION/UL (ref 3.81–5.12)
SODIUM SERPL-SCNC: 135 MMOL/L (ref 135–147)
TRIGL SERPL-MCNC: 114 MG/DL
TSH SERPL DL<=0.05 MIU/L-ACNC: 1.61 UIU/ML (ref 0.45–4.5)
WBC # BLD AUTO: 6.89 THOUSAND/UL (ref 4.31–10.16)

## 2023-04-29 DIAGNOSIS — C34.90 ADENOCARCINOMA OF LUNG, STAGE 4, UNSPECIFIED LATERALITY (HCC): ICD-10-CM

## 2023-04-29 DIAGNOSIS — Z51.5 PALLIATIVE CARE PATIENT: ICD-10-CM

## 2023-04-29 DIAGNOSIS — C79.51 SECONDARY MALIGNANT NEOPLASM OF BONE AND BONE MARROW (HCC): ICD-10-CM

## 2023-04-29 DIAGNOSIS — C79.52 SECONDARY MALIGNANT NEOPLASM OF BONE AND BONE MARROW (HCC): ICD-10-CM

## 2023-04-29 DIAGNOSIS — F32.5 MAJOR DEPRESSIVE DISORDER WITH SINGLE EPISODE, IN FULL REMISSION (HCC): ICD-10-CM

## 2023-04-29 DIAGNOSIS — F41.9 ANXIETY: ICD-10-CM

## 2023-05-01 ENCOUNTER — OFFICE VISIT (OUTPATIENT)
Dept: HEMATOLOGY ONCOLOGY | Facility: CLINIC | Age: 73
End: 2023-05-01

## 2023-05-01 VITALS
SYSTOLIC BLOOD PRESSURE: 122 MMHG | WEIGHT: 185 LBS | BODY MASS INDEX: 31.58 KG/M2 | OXYGEN SATURATION: 97 % | HEIGHT: 64 IN | HEART RATE: 67 BPM | RESPIRATION RATE: 16 BRPM | TEMPERATURE: 95.3 F | DIASTOLIC BLOOD PRESSURE: 70 MMHG

## 2023-05-01 DIAGNOSIS — C79.51 SECONDARY MALIGNANT NEOPLASM OF BONE AND BONE MARROW (HCC): ICD-10-CM

## 2023-05-01 DIAGNOSIS — C79.52 SECONDARY MALIGNANT NEOPLASM OF BONE AND BONE MARROW (HCC): ICD-10-CM

## 2023-05-01 DIAGNOSIS — G89.3 CANCER RELATED PAIN: Chronic | ICD-10-CM

## 2023-05-01 DIAGNOSIS — C41.9 MALIGNANT NEOPLASM OF BONE WITH METASTASES (HCC): ICD-10-CM

## 2023-05-01 DIAGNOSIS — G62.0 NEUROPATHY DUE TO CHEMOTHERAPEUTIC DRUG (HCC): ICD-10-CM

## 2023-05-01 DIAGNOSIS — C34.90 ADENOCARCINOMA OF LUNG, STAGE 4, UNSPECIFIED LATERALITY (HCC): Primary | ICD-10-CM

## 2023-05-01 DIAGNOSIS — N18.4 STAGE 4 CHRONIC KIDNEY DISEASE (HCC): ICD-10-CM

## 2023-05-01 DIAGNOSIS — Z51.5 PALLIATIVE CARE PATIENT: ICD-10-CM

## 2023-05-01 DIAGNOSIS — T45.1X5A NEUROPATHY DUE TO CHEMOTHERAPEUTIC DRUG (HCC): ICD-10-CM

## 2023-05-01 RX ORDER — BUSPIRONE HYDROCHLORIDE 7.5 MG/1
7.5 TABLET ORAL 3 TIMES DAILY
Qty: 180 TABLET | Refills: 0 | Status: SHIPPED | OUTPATIENT
Start: 2023-05-01

## 2023-05-01 NOTE — PROGRESS NOTES
Hematology/Oncology Outpatient Follow- up Note  Edmond Griffith 74 y o  female MRN: @ Encounter: [id number]        Date:  5/1/2023      Assessment / Plan:    Stage 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 of the lung with left scapula involvement diagnosed on 08/2016 PDL expression more than 50%, negative for EGFR mutation, ALK  rearrangement, Ros1 mutation     Treated initially with Pembrolizumab complicated with pneumonitis and later on nivolumab with prednisone 10 mg p o   Daily with excellent response      2   Disease progression in August 2018 in the left scapula with pain no new lesions by PET scan   Status post radiation therapy to the left scapula and treated with Alimta 500 milligram/meter squared, carboplatin AUC 5     After 3 cycles,  CT scan in January 2019 showed stable disease, and she was placed on maintenance Alimta 500 milligram/meter squared every 3 weeks         Alimta dose was reduced to 400 milligram/meter IV due to elevated Creatinine and  then Pemetrexed dose was reduced to 300 milligram/meter squared every 4 weeks    Cycle #38  Alimta was 6/22/21          3   Progression of disease 7/2021         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%   (MAP2K1 mutations are mutually exclusive with BRAF mutations)  There are FDA approved therapies indicated for other disease states such as binimetinib, cobimetinib, selumetinib, trametinib   Given the very small (0 1%) DNA amplification, use of these medications off label are likely to offer minimal benefit        Nivolumab 240 mg flat dose every 3 weeks and  carboplatin AUC 5 added to  pemetrexed which was dose reduced to 250 milligram/meter squared, initiated 8/2/2021     4   Progression of disease   Treatment changed to Taxotere 75mg/m2 10/2021  [place] added with cycle 2 11/2021      5   Right basal ganglia and right ischemic CVA on 01/2022 with hospitalization and rehab which may have been secondary to Cyramza     6   Therapy changed to Navelbine 30 mg per m2 every other week since 02/02/2022         7   Chemo induced anemia   Hemoglobin 9 9 6/20/22 compared to 12 3 4/2022;  10 6 5/2022      8   CKD 1  4 - 1 6     9   Atrial fibrillation/ history of CVA on Eliquis     10   Progression of disease   8/30/22- CT chest - Enlarged medial right lower lobe spiculated opacity now measuring 3 8 x 1 9 cm previously measured 2 1 x 1 2 suspicious for malignancy  D/W Dr Dora Denise  This appears to be in field where she had RT to T9-T11 in 2016      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, treatment plan change treatment to Lumakrans (sotorasib)    960 mg p o  daily in the morning without food     CT scan on 2/2023 showed minimal enlargement since November 14, 2022  however no new lesions  right lower lobe paraspinal mass was 3 8 x 1 9 cm on April 18, 2022, 4 1 x 2 3 cm on November 14, 2022, and now measures approximately 4 1 x 2 8 cm     Lumakrans (sotorasib)    960 mg p o  daily was continued  She is not having any side effects from the medications  She states she is tired, though, of taking so many pills  She is having a twinge of discomfort in the right parascapular region (patient with known right lower lobe paraspinal mass)    F/U CT C/A/P requested  HPI:    Teena Hawley was admitted to the hospital with arrhythmia and was found to have right lower lobe infiltrate in August 2016   She was treated with antibiotics however repeat chest x-ray showed persistent right lower lobe infiltrate  Subsequently the patient had a CT scan of the chest which showed a right perihilar mass, subcarinal lymphadenopathy, lytic lesion of the right costovertebral junction at T10 level   PET scan October 2016 showed a right perihilar mass measuring 3 5 cm with SUV of 8 9, subcarinal lymph nodes measuring 3 4 x 2 2 cm with SUV of 9 2   Nodule in the left upper lobe lung measured 2 x 1 1 cm   There was a lytic lesion involving the right 10th costovertebral junction with SUV of 14  4      Biopsy showed non-small cell carcinoma with features suggesting of adenocarcinoma positive for CK 7, CK 19, CA-19-9, ANEUDY-3, partially positive for P40, p63, negative for TTF-1   She had a history of uterine cancer in 2000 status post hysterectomy and did not require radiation or chemotherapy   She is status post bilateral oophorectomy, right knee replacement,   tonsillectomy       She used to smoke for 35 years 1 pack per day however quit smoking 21 years ago   She used hormonal replacement therapy for 3 years  Bella Cushing has a family history significant for skin cancer in her father and coronary artery disease in mother  Bella Cushing has 2 healthy children      Treated initially with Pembrolizumab December 2016, Finished in May 2017 secondary to grade 3 pneumonitis  Initiated on prednisone     Progression: Nivolumab 240 mg flat dose every 2 weeks along with prednisone 10 mg p o  Daily initiated April 2018- October 2018 with excellent response      Liquid biopsy showed K-MADHURI mutation G12C, no evidence of MSI high        Disease progression in August 2018 in the left scapula with pain no new lesions by PET scan   Status post radiation therapy to the left scapula and treated with Alimta 500 milligram/meter squared, carboplatin AUC 5   After 3 cycles,  CT scan in January 2019 showed stable disease  Carbo discontinued 3/2019    Maintenance Alimta 500 milligram/meter squared every 3 weeks initiated 3/2019        Cr 2/20 was 1 5   Plan was to dose reduce Alimta to 400mg/m2; however cr 2 16 2/24/20 and Alimta was held       3/4/20:  renal u/s  Minimal fullness of the left renal collecting system without matthieu hydronephrosis     Chronic right kidney lower pole cortical scar, with adjacent parenchymal calcification measuring 5 mm      She was on prednisone 5 mg p o  daily because of previous history of pneumonitis  CT scan chest 1/3/2020 showed no evidence of infiltration in the lung parenchyma, prednisone was reduced every other day for 1 month and then discontinued        Progression of disease 7/2021        Nivolumab 240 mg flat dose every 3 weeks and carboplatin AUC 5 added to pemetrexed which was dose reduced to 250 milligram/meter squared, initiated 8/2/2021     Progression of disease   Treatment changed to Taxotere 75mg/m2 10/2021  [place] added with cycle 2 11/2021      Right basal ganglia and right ischemic CVA on 01/2022 with hospitalization and rehab which may have been secondary to [address] changed to Navelbine 30 mg per m2 every other week since 02/02/2022 8/30/22- CT chest - Enlarged medial right lower lobe spiculated opacity now measuring 3 8 x 1 9 cm previously measured 2 1 x 1 2 suspicious for malignancy         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        9/2022 sotorasib 960 mg in the morning without food initiated with good tolerance     CT scan on 2/2023 showed minimal enlargement since November 14, 2022  however no new lesions  right lower lobe paraspinal mass  was 3 8 x 1 9 cm on April 18, 2022, 4 1 x 2 3 cm on November 14, 2022, and now measures approximately 4 1 x 2 8 cm       Interval History: April 6, 2023 hemoglobin 11, MCV 87, white blood cell count 6 89, platelets 356  BUN 25, creatinine 1 4      Review of Systems   Constitutional: Negative for appetite change, chills, diaphoresis, fatigue, fever and unexpected weight change  HENT:   Negative for mouth sores, nosebleeds, sore throat, tinnitus and voice change  Eyes: Negative for eye problems  Respiratory: Negative for chest tightness, cough, shortness of breath and wheezing  Cardiovascular: Negative for chest pain, leg swelling and palpitations  Gastrointestinal: Negative for abdominal distention, abdominal pain, blood in stool, constipation, diarrhea, nausea, rectal pain and vomiting  Endocrine: Negative for hot flashes  Genitourinary: Negative  "  Musculoskeletal: Negative for gait problem and myalgias  Skin: Negative for itching and rash  Neurological: Negative for dizziness, gait problem, headaches, light-headedness and numbness  Hematological: Negative for adenopathy  Psychiatric/Behavioral: Negative for confusion and sleep disturbance  The patient is not nervous/anxious  Test Results:        Labs:   Lab Results   Component Value Date    HGB 11 0 (L) 04/06/2023    HCT 36 5 04/06/2023    MCV 87 04/06/2023     04/06/2023    WBC 6 89 04/06/2023    NRBC 0 04/06/2023     Lab Results   Component Value Date     11/23/2015    K 4 6 04/06/2023     (H) 04/06/2023    CO2 23 04/06/2023    ANIONGAP 9 11/23/2015    BUN 25 04/06/2023    CREATININE 1 41 (H) 04/06/2023    GLUCOSE 98 01/03/2022    GLUF 128 (H) 04/06/2023    CALCIUM 9 5 04/06/2023    CORRECTEDCA 10 1 04/06/2023    AST 23 04/06/2023    ALT 21 04/06/2023    ALKPHOS 91 04/06/2023    PROT 6 8 11/23/2015    BILITOT 0 65 11/23/2015    EGFR 36 04/06/2023           Imaging: No results found  Allergies: Allergies   Allergen Reactions    Amoxicillin Hives    Amoxicillin Rash and Hives    Cardizem [Diltiazem] Rash     Rash      Statins Myalgia     Severe muscle aching  Terrible pains    Zofran [Ondansetron] Palpitations     Current Medications: Reviewed  PMH/FH/SH:  Reviewed      Physical Exam:    There is no height or weight on file to calculate BSA      Ht Readings from Last 3 Encounters:   04/17/23 5' 4\" (1 626 m)   04/10/23 5' 4\" (1 626 m)   03/03/23 5' 4\" (1 626 m)        Wt Readings from Last 3 Encounters:   04/17/23 84 9 kg (187 lb 4 oz)   04/10/23 83 5 kg (184 lb)   03/03/23 81 6 kg (180 lb)        Temp Readings from Last 3 Encounters:   04/10/23 (!) 97 1 °F (36 2 °C) (Temporal)   03/03/23 (!) 96 8 °F (36 °C) (Temporal)   02/13/23 97 6 °F (36 4 °C) (Temporal)        BP Readings from Last 3 Encounters:   04/17/23 140/70   04/10/23 120/70   03/03/23 118/72       " Physical Exam  Constitutional:       Appearance: She is well-developed  HENT:      Head: Normocephalic and atraumatic  Cardiovascular:      Rate and Rhythm: Normal rate  Pulmonary:      Effort: Pulmonary effort is normal  No respiratory distress  Skin:     General: Skin is warm and dry  Neurological:      Mental Status: She is alert     Psychiatric:         Behavior: Behavior normal          ECO    Emergency Contacts:    Extended Emergency Contact Information  Primary Emergency Contact: Patel Reinoso  Address: [address] [address] [place] [employer] Phone: [phone]  Relation: Brother  Secondary Emergency Contact: Liz Booth  Address: [address] [place] [address] [employer] Phone: [phone]  Relation: Daughter

## 2023-05-08 ENCOUNTER — OFFICE VISIT (OUTPATIENT)
Dept: PALLIATIVE MEDICINE | Facility: CLINIC | Age: 73
End: 2023-05-08

## 2023-05-08 VITALS
TEMPERATURE: 96.7 F | HEART RATE: 64 BPM | DIASTOLIC BLOOD PRESSURE: 70 MMHG | SYSTOLIC BLOOD PRESSURE: 120 MMHG | BODY MASS INDEX: 31.5 KG/M2 | OXYGEN SATURATION: 96 % | WEIGHT: 183.5 LBS

## 2023-05-08 DIAGNOSIS — F32.5 MAJOR DEPRESSIVE DISORDER WITH SINGLE EPISODE, IN FULL REMISSION (HCC): ICD-10-CM

## 2023-05-08 DIAGNOSIS — Z51.5 PALLIATIVE CARE PATIENT: ICD-10-CM

## 2023-05-08 DIAGNOSIS — T45.1X5A NEUROPATHY DUE TO CHEMOTHERAPEUTIC DRUG (HCC): ICD-10-CM

## 2023-05-08 DIAGNOSIS — I50.32 CHRONIC DIASTOLIC HEART FAILURE (HCC): ICD-10-CM

## 2023-05-08 DIAGNOSIS — C79.51 SECONDARY MALIGNANT NEOPLASM OF BONE AND BONE MARROW (HCC): ICD-10-CM

## 2023-05-08 DIAGNOSIS — M25.50 JOINT PAIN: ICD-10-CM

## 2023-05-08 DIAGNOSIS — G62.0 NEUROPATHY DUE TO CHEMOTHERAPEUTIC DRUG (HCC): ICD-10-CM

## 2023-05-08 DIAGNOSIS — F41.9 ANXIETY: ICD-10-CM

## 2023-05-08 DIAGNOSIS — K59.00 CONSTIPATION: ICD-10-CM

## 2023-05-08 DIAGNOSIS — C34.90 ADENOCARCINOMA OF LUNG, STAGE 4, UNSPECIFIED LATERALITY (HCC): Primary | ICD-10-CM

## 2023-05-08 DIAGNOSIS — K21.9 GASTROESOPHAGEAL REFLUX DISEASE WITHOUT ESOPHAGITIS: ICD-10-CM

## 2023-05-08 DIAGNOSIS — C79.52 SECONDARY MALIGNANT NEOPLASM OF BONE AND BONE MARROW (HCC): ICD-10-CM

## 2023-05-08 RX ORDER — BUSPIRONE HYDROCHLORIDE 7.5 MG/1
7.5 TABLET ORAL
Qty: 90 TABLET | Refills: 0
Start: 2023-05-08

## 2023-05-08 RX ORDER — GABAPENTIN 100 MG/1
100 CAPSULE ORAL 2 TIMES DAILY
Qty: 60 CAPSULE | Refills: 2 | Status: SHIPPED | OUTPATIENT
Start: 2023-05-08

## 2023-05-08 RX ORDER — OXYCODONE HYDROCHLORIDE 5 MG/1
5 CAPSULE ORAL EVERY 4 HOURS PRN
COMMUNITY

## 2023-05-08 RX ORDER — VENLAFAXINE 37.5 MG/1
37.5 TABLET ORAL
Qty: 90 TABLET | Refills: 0
Start: 2023-05-08

## 2023-05-08 RX ORDER — ACETAMINOPHEN 500 MG
1000 TABLET ORAL 3 TIMES DAILY
Qty: 540 TABLET | Refills: 3 | Status: SHIPPED | OUTPATIENT
Start: 2023-05-08

## 2023-05-08 NOTE — PROGRESS NOTES
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)    3  Gastroesophageal reflux disease without esophagitis    4  Anxiety    5  Major depressive disorder with single episode, in full remission ([facility] )    6  Chronic diastolic heart failure ([place] [id number] )    7  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[place] [id number] )    8  Palliative care patient    9  Constipation    10  Joint pain          • Patient states her mood is \"great\", though she has significantly decreased her BH regimen d/t daytime somnolence (improved on lower regimen)  Updating prescriptions to reflect her current usage  o Recommend patient discuss medication changes w/ prescribing provider when she desires to make a change  o Continue buspirone 7 5mg QHS  Of note, sotorazib lowers serum concentrations of buspirone  o Continue venlafaxine at 37 5mg QHS  o Patient has declined offer of psychotherapy  • Continue omeprazole for reflux  Effective, but patient is concerned that it may be affecting efficacy of sotorasib  Recommended she discuss w/ Medical Oncology  • Patient endorses polyarthralgia, R flank, R back pain  Some of her pain may be cancer-related; planned imaging may reveal more  o Recommended patient consider 1000mg TID ATC for chronic pain  No interactions between sotorasib and Tylenol   o Recommended patient resume oxyIR for pain; she has only very rarely taken this but it has been effective  No refill yet needed for oxyIR   o Start gabapentin at 100mg qAM (pain is worse in the daytime) and increase to 100mg q12h ATC after 7 days, to assess tolerance and response  o Recommend using a walker or cane to reduce fall risk and reduce pain  • Counseled on bowel regimen for constipation  • Continue disease-directed cares    • ACP: Patient has completed advanced directives " (POLST and Power of [place])  In EMR, reviewed today  Advanced directive counseling given  • Reviewed notes (Medical Oncology, Cardiology, Neurology), labs (4/6/23 Cr 1 41 eGFR 36, alb 3 2, Cl 109, tChol 141, Hb 11 0, A1c 6 5, TSH 1 610), imaging (2/16/23 CT chest)  • Return in about 3 months (around 8/8/2023)  • Emotional support provided  • Medication safety issues addressed - no driving under the influence of narcotics (including opioids), watch for adverse effects including AMS or respiratory depression (slowed breathing), keep medications stored in a safe/locked environment, do not use alcohol while opioids or other narcotics are in one's system  Requested Prescriptions     Signed Prescriptions Disp Refills   • acetaminophen (TYLENOL) 500 mg tablet 540 tablet 3     Sig: Take 2 tablets (1,000 mg total) by mouth 3 (three) times a day   • busPIRone (BUSPAR) 7 5 mg tablet 90 tablet 0     Sig: Take 1 tablet (7 5 mg total) by mouth daily at bedtime   • venlafaxine (EFFEXOR) 37 5 mg tablet 90 tablet 0     Sig: Take 1 tablet (37 5 mg total) by mouth daily at bedtime   • gabapentin (Neurontin) 100 mg capsule 60 capsule 2     Sig: Take 1 capsule (100 mg total) by mouth 2 (two) times a day       Medications Discontinued During This Encounter   Medication Reason   • acetaminophen (TYLENOL) 325 mg tablet Dose adjustment   • venlafaxine (EFFEXOR) 37 5 mg tablet Reorder   • busPIRone (BUSPAR) 7 5 mg tablet Reorder       Representatives have queried the patient's controlled substance dispensing history in the Prescription Drug Monitoring Program in compliance with regulations before I have prescribed any controlled substances  The prescription history is consistent with prescribed therapy and our practice policies        30+ minutes were spent in this ambulatory visit with greater than 50% of the time spent face to face with patient in counseling or coordination of care including symptom assessment and management, "medication review, medication adjustment, psychosocial support, chart review, imaging review, lab review, advanced directives, supportive listening and anticipatory guidance  All of the patient's questions were answered during this discussion  SUBJECTIVE:  Chief Complaint   Patient presents with   • Pain   • Follow-up   • Constipation   • Cancer   • Anxiety   • Depression   • Counseling        TUCKER Duncan is a 74 y o  female w/ stage IV non-small cell lung cancer (diagnosed 2016) w/ osseous metastasis s/p chemotherapy + immunotherapy + RT, DM2, HFpEF, Afib, CKD, HTN+HLD, h/o CVA  She follows w/ Jake Ulrich PA-C + Dr Didier Lancaster (Medical Oncology), [facility], Dr Savita Townsend (Cardiology), Dr Juan F Suresh (Neurology)  Plan includes systemic therapy w/ sotorasib  Patient is known to [facility] clinic; seen 2/13/23 for symptom assessment and management, medication review, medication adjustment, psychosocial support, chart review, imaging review, lab review, advanced directives, supportive listening and anticipatory guidance  Patient endorses polyarthralgia (knee, b/l hips), R flank / R back pain  Pain is 4/10 at time of visit  She remains hesitant to take oxyIR for chronic pain, but does note that when she does take it, pain reaches tolerable levels  Patient reports reflux is well managed though she is concerned about potential interaction between her PPI and sotorasib  OTC medications help w/ constipation  She denies recent N/V  Sleep is \"good\"  She reports her mood is \"great\" though she drastically reduced her BH regimen d/t unwanted day time somnolence  Somnolence improved on the reduced regimen  PDMP shows no concerns  The following portions of the medical history were reviewed: past medical history, surgical history, problem list, medication list, family history, and social history        Current Outpatient Medications:   •  acetaminophen (TYLENOL) 500 mg tablet, Take 2 tablets (1,000 mg " total) by mouth 3 (three) times a day, Disp: 540 tablet, Rfl: 3  •  busPIRone (BUSPAR) 7 5 mg tablet, Take 1 tablet (7 5 mg total) by mouth daily at bedtime, Disp: 90 tablet, Rfl: 0  •  gabapentin (Neurontin) 100 mg capsule, Take 1 capsule (100 mg total) by mouth 2 (two) times a day, Disp: 60 capsule, Rfl: 2  •  oxyCODONE (OXY-IR) 5 MG capsule, Take 5 mg by mouth every 4 (four) hours as needed for moderate pain or severe pain, Disp: , Rfl:   •  venlafaxine (EFFEXOR) 37 5 mg tablet, Take 1 tablet (37 5 mg total) by mouth daily at bedtime, Disp: 90 tablet, Rfl: 0  •  apixaban (Eliquis) 5 mg, TAKE 1 TABLET TWICE A DAY, Disp: 180 tablet, Rfl: 3  •  aspirin (ECOTRIN LOW STRENGTH) 81 mg EC tablet, Take 1 tablet (81 mg total) by mouth daily, Disp: 30 tablet, Rfl: 0  •  atorvastatin (LIPITOR) 40 mg tablet, TAKE 1 TABLET BY MOUTH EVERY DAY, Disp: 90 tablet, Rfl: 1  •  cyanocobalamin (VITAMIN B-12) 100 mcg tablet, Take by mouth daily, Disp: , Rfl:   •  folic acid (FOLVITE) 1 mg tablet, Take 1 tablet (1 mg total) by mouth daily, Disp: 90 tablet, Rfl: 1  •  lidocaine-prilocaine (EMLA) cream, Apply to port site 30 minutes prior to infusion and cover with a dressing (Patient not taking: Reported on 4/10/2023), Disp: 30 g, Rfl: 1  •  linaGLIPtin (Tradjenta) 5 MG TABS, Take 5 mg by mouth daily, Disp: 90 tablet, Rfl: 3  •  loperamide (IMODIUM) 2 mg capsule, Take 2 mg by mouth 4 (four) times a day as needed for diarrhea, Disp: , Rfl:   •  loratadine (CLARITIN) 10 mg tablet, Take 10 mg by mouth as needed, Disp: , Rfl:   •  LOSARTAN POTASSIUM PO, Take by mouth, Disp: , Rfl:   •  metFORMIN (GLUCOPHAGE) 500 mg tablet, Take 1 tablet (500 mg total) by mouth 2 (two) times a day with meals (Patient taking differently: Take 1,000 mg by mouth 2 (two) times a day with meals), Disp: 180 tablet, Rfl: 3  •  metoprolol tartrate (LOPRESSOR) 25 mg tablet, Take 1 tablet (25 mg total) by mouth every 12 (twelve) hours, Disp: 180 tablet, Rfl: 3  • omeprazole (PriLOSEC) 20 mg delayed release capsule, Take 1 capsule (20 mg total) by mouth daily, Disp: 90 capsule, Rfl: 3  •  potassium chloride (Klor-Con M10) 10 mEq tablet, Take 1 tablet (10 mEq total) by mouth daily, Disp: 90 tablet, Rfl: 3  •  sotalol (BETAPACE) 80 mg tablet, Take 1 tablet (80 mg total) by mouth 2 (two) times a day, Disp: 180 tablet, Rfl: 3  •  Sotorasib 120 MG TABS, Take 960 mg by mouth daily, Disp: 240 tablet, Rfl: 0  •  valsartan (DIOVAN) 160 mg tablet, TAKE 1 TABLET BY MOUTH TWICE A DAY, Disp: 180 tablet, Rfl: 1    Review of Systems   Constitutional: Positive for fatigue  Gastrointestinal: Positive for constipation  Negative for nausea and vomiting  Reflux managed / at goal    Genitourinary: Positive for flank pain (right)  Musculoskeletal: Positive for arthralgias and back pain  Allergic/Immunologic: Positive for immunocompromised state  Psychiatric/Behavioral: Positive for dysphoric mood (managed / at goal)  Negative for sleep disturbance  The patient is nervous/anxious (managed / at goal)  All other systems reviewed and are negative  OBJECTIVE:  /70 (BP Location: Left arm, Cuff Size: Standard)   Pulse 64   Temp (!) 96 7 °F (35 9 °C) (Temporal)   Wt 83 2 kg (183 lb 8 oz)   LMP  (LMP Unknown)   SpO2 96%   BMI 31 50 kg/m²   Physical Exam  Vitals reviewed  Constitutional:       General: She is not in acute distress  Appearance: She is well-groomed and overweight  She is not toxic-appearing  HENT:      Head: Normocephalic and atraumatic  Right Ear: External ear normal       Left Ear: External ear normal    Eyes:      General: No scleral icterus  Right eye: No discharge  Left eye: No discharge  Extraocular Movements: Extraocular movements intact  Conjunctiva/sclera: Conjunctivae normal       Pupils: Pupils are equal, round, and reactive to light  Cardiovascular:      Rate and Rhythm: Normal rate     Pulmonary: "Effort: Pulmonary effort is normal  No tachypnea, bradypnea, accessory muscle usage or respiratory distress  Comments: Able to speak comfortably in complete sentences on room air at rest   Abdominal:      General: There is no distension  Tenderness: There is no guarding  Musculoskeletal:      Cervical back: Normal range of motion  Right lower leg: No edema  Left lower leg: No edema  Skin:     General: Skin is dry  Coloration: Skin is not pale  Neurological:      Mental Status: She is alert and oriented to person, place, and time  Cranial Nerves: No dysarthria or facial asymmetry  Gait: Gait is intact  Psychiatric:         Attention and Perception: Attention normal          Mood and Affect: Mood and affect normal          Speech: Speech normal          Behavior: Behavior normal  Behavior is cooperative  Thought Content: Thought content normal          Cognition and Memory: Cognition and memory normal          Judgment: Judgment normal           Jeannette Peña MD  [facility]  [phone]    Portions of this document may have been created using dictation software and as such some \"sound alike\" terms may have been generated by the system  Do not hesitate to contact me with any questions or clarifications     "

## 2023-05-08 NOTE — PATIENT INSTRUCTIONS
"It was good to see you today  Thank you for coming in  Start gabapentin for pain  Starting today, take 100mg every morning; do this for 3 days  On day 7, take 100mg every morning and every afternoon  Continue  If you experience any adverse effects during this ramp-up please call us  Tylenol, 1000mg three times per day every day, can be a safe and effective way to reduce chronic pain  Let me know if you would like a refill on oxycodone  Use an assistive device (cane, walker) to reduce joint pain and reduce fall risk  Consider changing your omeprazole dose to nighttime - but discuss interaction between this and the sotorasib w/ Dr Bo Schilder team   For constipation:  Drink PLENTY of water  This is important to keep the gut moving  Some people have success w/ using prunes, prune juice, certain fruits or vegetables (apples, bananas, prunes, pears, raspberries, and vegetables like string beans, broccoli, spinach, kale, squash, lentils, peas, and beans), or fiber gummies  Try a probiotic  This could be yogurt or kefir, or fermented beverages such as kombucha, but probiotics are also available in capsule form  Aim for 10-15 billion colony-forming-units, w/ bacteria such as Lactobacillus / Saccharomyces / Actinomyces  Osmotic laxatives (Miralax, magnesium citrate, Milk of Magnesia) can be very useful for opioid-induced constipation (OIC); take daily to prevent OIC  Bulk laxatives (Citrucel, Metamucil, Fibercon, Benefiber, wheat germ) are useful for constipation in patients who are not taking opioids, but are not recommended if you are taking opioids  Colace is good for softening hard stools, or preventing constipation when opioids are being used - but does not stimulate the bowel to move things along once constipation has occurred  You can use senna, 1 to 2 tabs, once or twice daily as needed for constipation  Use as directed on the box/bottle  Senna is also available in a tea (\"Smooth Move\")   Should that not " be enough for your constipation, you can try Dulcolax  Should that not be enough, consider an enema  All of these medications are available over-the-counter  Return in about 3 months (around 8/8/2023)  Call us for refills on medications that we supply, as needed  If something changes and you need to come in sooner, please call our office  PRESCRIPTION REFILL REMINDER:  All medication refills should be requested prior to [facility] on Friday  Any refill requests after noon on Friday would be addressed the following Monday  MEDICATION SAFETY ISSUES:  Do not drive under the influence of narcotics (including opioids), watch for adverse effects including confusion / altered mental status / respiratory depression (slowed breathing), keep medications stored in a safe/locked environment, do not use alcohol while opioids or other narcotics are in your system

## 2023-05-10 DIAGNOSIS — T46.6X5A MYALGIA DUE TO STATIN: ICD-10-CM

## 2023-05-10 DIAGNOSIS — M79.10 MYALGIA DUE TO STATIN: ICD-10-CM

## 2023-05-10 RX ORDER — ATORVASTATIN CALCIUM 40 MG/1
TABLET, FILM COATED ORAL
Qty: 90 TABLET | Refills: 1 | Status: SHIPPED | OUTPATIENT
Start: 2023-05-10

## 2023-05-16 ENCOUNTER — OFFICE VISIT (OUTPATIENT)
Dept: FAMILY MEDICINE CLINIC | Facility: CLINIC | Age: 73
End: 2023-05-16

## 2023-05-16 VITALS
BODY MASS INDEX: 33.97 KG/M2 | OXYGEN SATURATION: 99 % | TEMPERATURE: 96 F | SYSTOLIC BLOOD PRESSURE: 120 MMHG | HEART RATE: 69 BPM | RESPIRATION RATE: 16 BRPM | WEIGHT: 184.6 LBS | DIASTOLIC BLOOD PRESSURE: 60 MMHG | HEIGHT: 62 IN

## 2023-05-16 DIAGNOSIS — N18.30 TYPE 2 DIABETES MELLITUS WITH STAGE 3 CHRONIC KIDNEY DISEASE, WITHOUT LONG-TERM CURRENT USE OF INSULIN, UNSPECIFIED WHETHER STAGE 3A OR 3B CKD (HCC): ICD-10-CM

## 2023-05-16 DIAGNOSIS — D70.1 CHEMOTHERAPY INDUCED NEUTROPENIA (HCC): ICD-10-CM

## 2023-05-16 DIAGNOSIS — Z00.00 MEDICARE ANNUAL WELLNESS VISIT, SUBSEQUENT: Primary | ICD-10-CM

## 2023-05-16 DIAGNOSIS — E11.22 TYPE 2 DIABETES MELLITUS WITH STAGE 3 CHRONIC KIDNEY DISEASE, WITHOUT LONG-TERM CURRENT USE OF INSULIN, UNSPECIFIED WHETHER STAGE 3A OR 3B CKD (HCC): ICD-10-CM

## 2023-05-16 DIAGNOSIS — E78.5 DYSLIPIDEMIA: ICD-10-CM

## 2023-05-16 DIAGNOSIS — I48.0 PAROXYSMAL ATRIAL FIBRILLATION (HCC): ICD-10-CM

## 2023-05-16 DIAGNOSIS — T45.1X5A CHEMOTHERAPY INDUCED NEUTROPENIA (HCC): ICD-10-CM

## 2023-05-16 DIAGNOSIS — F32.5 MAJOR DEPRESSIVE DISORDER WITH SINGLE EPISODE, IN FULL REMISSION (HCC): ICD-10-CM

## 2023-05-16 DIAGNOSIS — E11.9 TYPE 2 DIABETES MELLITUS WITHOUT COMPLICATION, WITHOUT LONG-TERM CURRENT USE OF INSULIN (HCC): ICD-10-CM

## 2023-05-16 PROBLEM — D69.6 THROMBOCYTOPENIA, UNSPECIFIED (HCC): Status: RESOLVED | Noted: 2023-01-20 | Resolved: 2023-05-16

## 2023-05-16 NOTE — PATIENT INSTRUCTIONS
Medicare Preventive Visit Patient Instructions  Thank you for completing your Welcome to Medicare Visit or Medicare Annual Wellness Visit today  Your next wellness visit will be due in one year (5/16/2024)  The screening/preventive services that you may require over the next 5-10 years are detailed below  Some tests may not apply to you based off risk factors and/or age  Screening tests ordered at today's visit but not completed yet may show as past due  Also, please note that scanned in results may not display below  Preventive Screenings:  Service Recommendations Previous Testing/Comments   Colorectal Cancer Screening  * Colonoscopy    * Fecal Occult Blood Test (FOBT)/Fecal Immunochemical Test (FIT)  * Fecal DNA/Cologuard Test  * Flexible Sigmoidoscopy Age: 39-70 years old   Colonoscopy: every 10 years (may be performed more frequently if at higher risk)  OR  FOBT/FIT: every 1 year  OR  Cologuard: every 3 years  OR  Sigmoidoscopy: every 5 years  Screening may be recommended earlier than age 39 if at higher risk for colorectal cancer  Also, an individualized decision between you and your healthcare provider will decide whether screening between the ages of 74-80 would be appropriate  Colonoscopy: 11/17/2004  FOBT/FIT: Not on file  Cologuard: Not on file  Sigmoidoscopy: Not on file    Screening Current     Breast Cancer Screening Age: 36 years old  Frequency: every 1-2 years  Not required if history of left and right mastectomy Mammogram: 02/29/2016        Cervical Cancer Screening Between the ages of 21-29, pap smear recommended once every 3 years  Between the ages of 33-67, can perform pap smear with HPV co-testing every 5 years     Recommendations may differ for women with a history of total hysterectomy, cervical cancer, or abnormal pap smears in past  Pap Smear: Not on file    Screening Not Indicated   Hepatitis C Screening Once for adults born between 1945 and 1965  More frequently in patients at high risk for Hepatitis C Hep C Antibody: 10/01/2019    Screening Current   Diabetes Screening 1-2 times per year if you're at risk for diabetes or have pre-diabetes Fasting glucose: 128 mg/dL (4/6/2023)  A1C: 6 5 % (4/6/2023)  Screening Not Indicated  History Diabetes   Cholesterol Screening Once every 5 years if you don't have a lipid disorder  May order more often based on risk factors  Lipid panel: 04/06/2023    Screening Not Indicated  History Lipid Disorder     Other Preventive Screenings Covered by Medicare:  1  Abdominal Aortic Aneurysm (AAA) Screening: covered once if your at risk  You're considered to be at risk if you have a family history of AAA  2  Lung Cancer Screening: covers low dose CT scan once per year if you meet all of the following conditions: (1) Age 50-69; (2) No signs or symptoms of lung cancer; (3) Current smoker or have quit smoking within the last 15 years; (4) You have a tobacco smoking history of at least 20 pack years (packs per day multiplied by number of years you smoked); (5) You get a written order from a healthcare provider  3  Glaucoma Screening: covered annually if you're considered high risk: (1) You have diabetes OR (2) Family history of glaucoma OR (3)  aged 48 and older OR (3)  American aged 72 and older  3  Osteoporosis Screening: covered every 2 years if you meet one of the following conditions: (1) You're estrogen deficient and at risk for osteoporosis based off medical history and other findings; (2) Have a vertebral abnormality; (3) On glucocorticoid therapy for more than 3 months; (4) Have primary hyperparathyroidism; (5) On osteoporosis medications and need to assess response to drug therapy  · Last bone density test (DXA Scan): 06/17/2015  5  HIV Screening: covered annually if you're between the age of 12-76  Also covered annually if you are younger than 13 and older than 72 with risk factors for HIV infection   For pregnant patients, it is covered up to 3 times per pregnancy  Immunizations:  Immunization Recommendations   Influenza Vaccine Annual influenza vaccination during flu season is recommended for all persons aged >= 6 months who do not have contraindications   Pneumococcal Vaccine   * Pneumococcal conjugate vaccine = PCV13 (Prevnar 13), PCV15 (Vaxneuvance), PCV20 (Prevnar 20)  * Pneumococcal polysaccharide vaccine = PPSV23 (Pneumovax) Adults 25-60 years old: 1-3 doses may be recommended based on certain risk factors  Adults 72 years old: 1-2 doses may be recommended based off what pneumonia vaccine you previously received   Hepatitis B Vaccine 3 dose series if at intermediate or high risk (ex: diabetes, end stage renal disease, liver disease)   Tetanus (Td) Vaccine - COST NOT COVERED BY MEDICARE PART B Following completion of primary series, a booster dose should be given every 10 years to maintain immunity against tetanus  Td may also be given as tetanus wound prophylaxis  Tdap Vaccine - COST NOT COVERED BY MEDICARE PART B Recommended at least once for all adults  For pregnant patients, recommended with each pregnancy  Shingles Vaccine (Shingrix) - COST NOT COVERED BY MEDICARE PART B  2 shot series recommended in those aged 48 and above     Health Maintenance Due:      Topic Date Due   • Breast Cancer Screening: Mammogram  06/23/2016   • Colorectal Cancer Screening  04/30/2028   • Hepatitis C Screening  Completed     Immunizations Due:      Topic Date Due   • COVID-19 Vaccine (5 - Booster for Rodriguez Peter series) 06/18/2022     Advance Directives   What are advance directives? Advance directives are legal documents that state your wishes and plans for medical care  These plans are made ahead of time in case you lose your ability to make decisions for yourself  Advance directives can apply to any medical decision, such as the treatments you want, and if you want to donate organs  What are the types of advance directives?   There are many types of advance directives, and each state has rules about how to use them  You may choose a combination of any of the following:  · Living will: This is a written record of the treatment you want  You can also choose which treatments you do not want, which to limit, and which to stop at a certain time  This includes surgery, medicine, IV fluid, and tube feedings  · Durable power of  for healthcare [place] [facility]): This is a written record that states who you want to make healthcare choices for you when you are unable to make them for yourself  This person, called a proxy, is usually a family member or a friend  You may choose more than 1 proxy  · Do not resuscitate (DNR) order:  A DNR order is used in case your heart stops beating or you stop breathing  It is a request not to have certain forms of treatment, such as CPR  A DNR order may be included in other types of advance directives  · Medical directive: This covers the care that you want if you are in a coma, near death, or unable to make decisions for yourself  You can list the treatments you want for each condition  Treatment may include pain medicine, surgery, blood transfusions, dialysis, IV or tube feedings, and a ventilator (breathing machine)  · Values history: This document has questions about your views, beliefs, and how you feel and think about life  This information can help others choose the care that you would choose  Why are advance directives important? An advance directive helps you control your care  Although spoken wishes may be used, it is better to have your wishes written down  Spoken wishes can be misunderstood, or not followed  Treatments may be given even if you do not want them  An advance directive may make it easier for your family to make difficult choices about your care  Urinary Incontinence   Urinary incontinence (UI)  is when you lose control of your bladder   UI develops because your bladder cannot store or empty urine properly  The 3 most common types of UI are stress incontinence, urge incontinence, or both  Medicines:   · May be given to help strengthen your bladder control  Report any side effects of medication to your healthcare provider  Do pelvic muscle exercises often:  Your pelvic muscles help you stop urinating  Squeeze these muscles tight for 5 seconds, then relax for 5 seconds  Gradually work up to squeezing for 10 seconds  Do 3 sets of 15 repetitions a day, or as directed  This will help strengthen your pelvic muscles and improve bladder control  Train your bladder:  Go to the bathroom at set times, such as every 2 hours, even if you do not feel the urge to go  You can also try to hold your urine when you feel the urge to go  For example, hold your urine for 5 minutes when you feel the urge to go  As that becomes easier, hold your urine for 10 minutes  Self-care:   · Keep a UI record  Write down how often you leak urine and how much you leak  Make a note of what you were doing when you leaked urine  · Drink liquids as directed  You may need to limit the amount of liquid you drink to help control your urine leakage  Do not drink any liquid right before you go to bed  Limit or do not have drinks that contain caffeine or alcohol  · Prevent constipation  Eat a variety of high-fiber foods  Good examples are high-fiber cereals, beans, vegetables, and whole-grain breads  Walking is the best way to trigger your intestines to have a bowel movement  · Exercise regularly and maintain a healthy weight  Weight loss and exercise will decrease pressure on your bladder and help you control your leakage  · Use a catheter as directed  to help empty your bladder  A catheter is a tiny, plastic tube that is put into your bladder to drain your urine  · Go to behavior therapy as directed  Behavior therapy may be used to help you learn to control your urge to urinate      Weight Management   Why it is important to manage your weight:  Being overweight increases your risk of health conditions such as heart disease, high blood pressure, type 2 diabetes, and certain types of cancer  It can also increase your risk for osteoarthritis, sleep apnea, and other respiratory problems  Aim for a slow, steady weight loss  Even a small amount of weight loss can lower your risk of health problems  How to lose weight safely:  A safe and healthy way to lose weight is to eat fewer calories and get regular exercise  You can lose up about 1 pound a week by decreasing the number of calories you eat by 500 calories each day  Healthy meal plan for weight management:  A healthy meal plan includes a variety of foods, contains fewer calories, and helps you stay healthy  A healthy meal plan includes the following:  · Eat whole-grain foods more often  A healthy meal plan should contain fiber  Fiber is the part of grains, fruits, and vegetables that is not broken down by your body  Whole-grain foods are healthy and provide extra fiber in your diet  Some examples of whole-grain foods are whole-wheat breads and pastas, oatmeal, brown rice, and bulgur  · Eat a variety of vegetables every day  Include dark, leafy greens such as spinach, kale, gabriela greens, and mustard greens  Eat yellow and orange vegetables such as carrots, sweet potatoes, and winter squash  · Eat a variety of fruits every day  Choose fresh or canned fruit (canned in its own juice or light syrup) instead of juice  Fruit juice has very little or no fiber  · Eat low-fat dairy foods  Drink fat-free (skim) milk or 1% milk  Eat fat-free yogurt and low-fat cottage cheese  Try low-fat cheeses such as mozzarella and other reduced-fat cheeses  · Choose meat and other protein foods that are low in fat  Choose beans or other legumes such as split peas or lentils  Choose fish, skinless poultry (chicken or turkey), or lean cuts of red meat (beef or pork)   Before you cook meat or poultry, cut off any visible fat  · Use less fat and oil  Try baking foods instead of frying them  Add less fat, such as margarine, sour cream, regular salad dressing and mayonnaise to foods  Eat fewer high-fat foods  Some examples of high-fat foods include french fries, doughnuts, ice cream, and cakes  · Eat fewer sweets  Limit foods and drinks that are high in sugar  This includes candy, cookies, regular soda, and sweetened drinks  Exercise:  Exercise at least 30 minutes per day on most days of the week  Some examples of exercise include walking, biking, dancing, and swimming  You can also fit in more physical activity by taking the stairs instead of the elevator or parking farther away from stores  Ask your healthcare provider about the best exercise plan for you  © Copyright [employer] 2018 Information is for End User's use only and may not be sold, redistributed or otherwise used for commercial purposes   All illustrations and images included in CareNotes® are the copyrighted property of [employer]  or [address] [place] [address]

## 2023-05-16 NOTE — PROGRESS NOTES
Assessment and Plan:     Problem List Items Addressed This Visit        Endocrine    Type 2 diabetes mellitus with stage 3 chronic kidney disease, without long-term current use of insulin, unspecified whether stage 3a or 3b CKD ([facility] )       Cardiovascular and Mediastinum    Paroxysmal atrial fibrillation ([facility] )     On eliquis  Seeing cardiology            Other    Major depressive disorder with single episode, in full remission ([facility] )     Stable on effexor         Chemotherapy induced neutropenia ([facility] )    Dyslipidemia     Stable on crurent meds         Medicare annual wellness visit, subsequent - Primary     BMI Counseling: Body mass index is 33 54 kg/m²  The BMI is above normal  Nutrition recommendations include encouraging healthy choices of fruits and vegetables  Rationale for BMI follow-up plan is due to patient being overweight or obese  Preventive health issues were discussed with patient, and age appropriate screening tests were ordered as noted in patient's After Visit Summary  Personalized health advice and appropriate referrals for health education or preventive services given if needed, as noted in patient's After Visit Summary  History of Present Illness:     Patient presents for a Medicare Wellness Visit    awv visit  C/o back in side -told Dr Nikole Wetzel- needs Ct  Taking tylenol- CT scheduled for June 1st  Mood has been ok  Knee has been painful- current ortho won't do a knee replacement    Hypertension  This is a chronic problem  The current episode started more than 1 year ago  The problem is unchanged  The problem is controlled  There are no associated agents to hypertension  Risk factors for coronary artery disease include dyslipidemia  Past treatments include angiotensin blockers  The current treatment provides significant improvement  There are no compliance problems  Diabetes  She presents for her follow-up diabetic visit  She has type 2 diabetes mellitus   Her disease course has been improving  There are no hypoglycemic associated symptoms  There are no diabetic associated symptoms  There are no hypoglycemic complications  Symptoms are improving  There are no diabetic complications  Risk factors for coronary artery disease include diabetes mellitus, dyslipidemia and hypertension  Current diabetic treatment includes oral agent (dual therapy)  Patient Care Team:  Femi Hernandez DO as PCP - General (Family Medicine)  MD Madelin Lewis MD Rosaria Hard, MD Alonso Askew, MD Frankie Hartshorn, MD Nyle Calkin, DO Robyn Redder, CRNP Ollis Charm, MD  [facility] DO Buddy Vargas MD as Consulting Physician (Radiation Oncology)  Femi Hernandez, Madeline Gallardo MD (Surgical Oncology)     Review of Systems:     Review of Systems   Constitutional: Negative  HENT: Negative  Eyes: Negative  Respiratory: Negative  Cardiovascular: Negative  Gastrointestinal: Negative  Endocrine: Negative  Genitourinary: Negative  Musculoskeletal: Positive for arthralgias  Allergic/Immunologic: Negative  Neurological: Negative  Hematological: Negative  Psychiatric/Behavioral: Negative           Problem List:     Patient Active Problem List   Diagnosis   • Paroxysmal atrial fibrillation (HCC)   • Primary hypertension   • Adenocarcinoma of lung, stage 4 (HCC)   • Chronic diastolic heart failure ([address] )   • Cancer related pain   • Type 2 diabetes mellitus with stage 3 chronic kidney disease, without long-term current use of insulin, unspecified whether stage 3a or 3b CKD ([address] )   • Malignant neoplasm of bone with metastases ([address] )   • Acid reflux   • Hyperlipidemia   • Major depressive disorder with single episode, in full remission ([address] )   • Vitamin D deficiency   • Adhesive capsulitis of shoulder   • Anxiety   • 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)   • CINV (chemotherapy-induced nausea and vomiting)   • Cyst of pancreas   • Pneumonitis   • Anemia in stage 3 chronic kidney disease ([facility] [address] )   • Palliative care patient   • Hypercalcemia   • Chemotherapy induced neutropenia ([place] [address] )   • Former smoker   • Lung cancer ([facility] )   • Depression   • History of stroke   • GERD (gastroesophageal reflux disease)   • 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[facility] )   • Dyslipidemia   • Cognitive decline   • Anemia due to antineoplastic chemotherapy   • Acquired trigger finger of right ring finger   • Stage 4 chronic kidney disease (HCC)   • Constipation   • Medicare annual wellness visit, subsequent      Past Medical and Surgical History:     Past Medical History:   Diagnosis Date   • A-fib [facility])    • Arthritis    • Atrial fibrillation ([place] [address] )    • Confusion    • Diabetes mellitus ([place] [address] )    • Diabetes mellitus type 2, uncomplicated ([place] [address] )     Last assessed: 8/17/17   • Encephalopathy 1/6/2022   • Essential hypertension    • Frozen shoulder     L shoulder   • GERD (gastroesophageal reflux disease)    • Hx of cancer of uterus     Last assessed: 8/21/15   • Hyperlipidemia    • Hypertension    • Hyponatremia 11/16/2016   • Lung mass     diagnosed 9/2016   • Malignant neoplasm without specification of site [facility])    • Skin cancer, basal cell     right eye area   • Stage 4 lung cancer ([place] [address] )    • Thrombocytopenia, unspecified ([place] [address] ) 1/20/2023     Past Surgical History:   Procedure Laterality Date   • APPENDECTOMY     • BRONCHOSCOPY N/A 11/17/2016    Procedure: BRONCHOSCOPY FLEXIBLE;  Surgeon: Leonor Canela MD;  [facility];  Service:    • CHOLECYSTECTOMY     • COLONOSCOPY     • COLONOSCOPY     • FL GUIDED CENTRAL VENOUS ACCESS DEVICE INSERTION  12/14/2021   • GALLBLADDER SURGERY     • HYSTERECTOMY  2000    Total abdominal   • JOINT REPLACEMENT     • LARYNGOSCOPY      Flexible Fiberoptic, (Therapeutic), Resolved: 11/17/16   • MOHS SURGERY      Micrographic Surgery Face   • TX [address] INCL FLUOR GDNCE DX W/CELL WASHG SPX N/A 5/24/2017    Procedure: BRONCHOSCOPY FLEXIBLE;  Surgeon: Luis Angel Gastelum MD;  Location: [facility]; Service: Pulmonary   • CA BRONCHOSCOPY NEEDLE BX TRACHEA MAIN STEM&/BRON N/A 11/17/2016    Procedure: EBUS; FROZEN SECTION ;  Surgeon: Mikey Peralta MD;  Location: [facility];  Service: Thoracic   • CA INSJ TUNNELED CTR VAD W/SUBQ PORT AGE 5 YR/> N/A 12/14/2021    Procedure: INSERTION VENOUS PORT ( PORT-A-CATH) IR;  Surgeon: Stephenie Hayden DO;  [facility];  Service: Interventional Radiology   • TONSILECTOMY AND ADNOIDECTOMY     • TONSILLECTOMY     • TOTAL KNEE ARTHROPLASTY Right 09/23/2014      Family History:     Family History   Problem Relation Age of Onset   • Heart disease Father         cardiac disorder   • Hypertension Father    • Arthritis Father    • Stroke Father         cerebrovascular accident   • Skin cancer Father    • Diabetes Mother    • Heart disease Mother    • Dementia Mother    • Hypertension Mother    • Thyroid disease Mother    • Cancer Maternal Grandfather         of unknown origin   • Cancer Family    • Depression Family    • Diabetes Family    • Hyperlipidemia Family         essential   • Heart disease Family    • Hypertension Family    • Stroke Family         syndrome   • Lung cancer Paternal Uncle    • Muscular dystrophy Brother       Social History:     Social History     Socioeconomic History   • Marital status:       Spouse name: None   • Number of children: None   • Years of education: None   • Highest education level: None   Occupational History   • Occupation: retired   Tobacco Use   • Smoking status: Never   • Smokeless tobacco: Never   • Tobacco comments:     Quit in 2000   Vaping Use   • Vaping Use: Never used   Substance and Sexual Activity   • Alcohol use: No   • Drug use: No   • Sexual activity: Not Currently   Other Topics Concern   • None   Social History Narrative    ** Merged History Encounter **         Family dynamics: Supportive  Relationship status:    Children and Ages:1 adult dtr Micah Harada, 1 adult son Jovi Martinez  Other important family information: Caregiver for her brother Leilani Fernandez, who is disabled  Living situation: Lives with brother        Employm    ent history/source of income: Worked as a  for [employer] prior to [employer]   Spirituality/ Church: Anabaptist    Patient's strengths, social supports, and resources: Supportive family  Hobbies/Interests: Sewing; Crafting  Advanced Directiv    e: States dtr Micah Harada is her POA       Social Determinants of Health     Financial Resource Strain: Low Risk    • Difficulty of Paying Living Expenses: Not hard at all   Food Insecurity: Not on file   Transportation Needs: No Transportation Needs   • Lack of Transportation (Medical): No   • Lack of Transportation (Non-Medical):  No   Physical Activity: Not on file   Stress: Not on file   Social Connections: Not on file   Intimate Partner Violence: Not on file   Housing Stability: Not on file      Medications and Allergies:     Current Outpatient Medications   Medication Sig Dispense Refill   • acetaminophen (TYLENOL) 500 mg tablet Take 2 tablets (1,000 mg total) by mouth 3 (three) times a day 540 tablet 3   • apixaban (Eliquis) 5 mg TAKE 1 TABLET TWICE A  tablet 3   • aspirin (ECOTRIN LOW STRENGTH) 81 mg EC tablet Take 1 tablet (81 mg total) by mouth daily 30 tablet 0   • atorvastatin (LIPITOR) 40 mg tablet TAKE 1 TABLET BY MOUTH EVERY DAY 90 tablet 1   • busPIRone (BUSPAR) 7 5 mg tablet Take 1 tablet (7 5 mg total) by mouth daily at bedtime 90 tablet 0   • cyanocobalamin (VITAMIN B-12) 100 mcg tablet Take by mouth daily     • folic acid (FOLVITE) 1 mg tablet Take 1 tablet (1 mg total) by mouth daily 90 tablet 1   • gabapentin (Neurontin) 100 mg capsule Take 1 capsule (100 mg total) by mouth 2 (two) times a day 60 capsule 2   • linaGLIPtin (Tradjenta) 5 MG TABS Take 5 mg by mouth daily 90 tablet 3   • loperamide (IMODIUM) 2 mg capsule Take 2 mg by mouth 4 (four) times a day as needed for diarrhea     • loratadine (CLARITIN) 10 mg tablet Take 10 mg by mouth as needed     • LOSARTAN POTASSIUM PO Take by mouth     • metFORMIN (GLUCOPHAGE) 500 mg tablet Take 1 tablet (500 mg total) by mouth 2 (two) times a day with meals (Patient taking differently: Take 1,000 mg by mouth 2 (two) times a day with meals) 180 tablet 3   • metoprolol tartrate (LOPRESSOR) 25 mg tablet Take 1 tablet (25 mg total) by mouth every 12 (twelve) hours 180 tablet 3   • omeprazole (PriLOSEC) 20 mg delayed release capsule Take 1 capsule (20 mg total) by mouth daily 90 capsule 3   • oxyCODONE (OXY-IR) 5 MG capsule Take 5 mg by mouth every 4 (four) hours as needed for moderate pain or severe pain     • potassium chloride (Klor-Con M10) 10 mEq tablet Take 1 tablet (10 mEq total) by mouth daily 90 tablet 3   • sotalol (BETAPACE) 80 mg tablet Take 1 tablet (80 mg total) by mouth 2 (two) times a day 180 tablet 3   • Sotorasib 120 MG TABS Take 960 mg by mouth daily 240 tablet 0   • valsartan (DIOVAN) 160 mg tablet TAKE 1 TABLET BY MOUTH TWICE A  tablet 1   • venlafaxine (EFFEXOR) 37 5 mg tablet Take 1 tablet (37 5 mg total) by mouth daily at bedtime 90 tablet 0     No current facility-administered medications for this visit       Allergies   Allergen Reactions   • Amoxicillin Hives   • Amoxicillin Rash and Hives   • Cardizem [Diltiazem] Rash     Rash     • Statins Myalgia     Severe muscle aching  Terrible pains   • Zofran [Ondansetron] Palpitations      Immunizations:     Immunization History   Administered Date(s) Administered   • COVID-19 PFIZER VACCINE 0 3 ML IM 02/20/2021, 03/12/2021, 11/22/2021   • COVID-19 Pfizer vac (Haile-sucrose, gray cap) 12 yr+ IM 04/23/2022   • H1N1, All Formulations 11/05/2009   • INFLUENZA 10/27/2014, 09/21/2015, 10/19/2016, 10/02/2017, 10/10/2018, 09/26/2019, 08/26/2020, 12/09/2021, 10/17/2022   • Influenza, seasonal, injectable 10/27/2014, 10/19/2016, 10/02/2017   • Pneumococcal Conjugate 13-Valent 12/03/2015   • Pneumococcal Conjugate Vaccine 20-valent (Pcv20), Polysace 11/15/2022   • Pneumococcal Polysaccharide PPV23 09/26/2014   • Tdap 10/06/2018   • Zoster 10/03/2013   • influenza, trivalent, adjuvanted 08/26/2019      Health Maintenance:         Topic Date Due   • Breast Cancer Screening: Mammogram  06/23/2016   • Colorectal Cancer Screening  04/30/2028   • Hepatitis C Screening  Completed         Topic Date Due   • COVID-19 Vaccine (5 - Booster for Michael Cantu series) 06/18/2022      Medicare Screening Tests and Risk Assessments:     Phil Prabhakar is here for her Subsequent Wellness visit  Health Risk Assessment:   Patient rates overall health as good  Patient feels that their physical health rating is same  Patient is satisfied with their life  Eyesight was rated as same  Hearing was rated as same  Patient feels that their emotional and mental health rating is same  Patients states they are never, rarely angry  Patient states they are sometimes unusually tired/fatigued  Pain experienced in the last 7 days has been some  Patient's pain rating has been 4/10  Patient states that she has experienced no weight loss or gain in last 6 months  Depression Screening:   PHQ-9 Score: 3      Fall Risk Screening: In the past year, patient has experienced: no history of falling in past year      Urinary Incontinence Screening:   Patient has leaked urine accidently in the last six months  Home Safety:  Patient does not have trouble with stairs inside or outside of their home  Patient has working smoke alarms and has working carbon monoxide detector  Home safety hazards include: none  Nutrition:   Current diet is Regular and Frequent junk food  Low sodium     Medications:   Patient is currently taking over-the-counter supplements  OTC medications include: see medication list  Patient is able to manage medications       Activities of Daily Living (ADLs)/Instrumental Activities of "Daily Living (IADLs):   Walk and transfer into and out of bed and chair?: Yes  Dress and groom yourself?: Yes    Bathe or shower yourself?: Yes    Feed yourself? Yes  Do your laundry/housekeeping?: Yes  Manage your money, pay your bills and track your expenses?: Yes  Make your own meals?: Yes    Do your own shopping?: Yes    Previous Hospitalizations:   Any hospitalizations or ED visits within the last 12 months?: No      Advance Care Planning:   Living will: Yes    Durable POA for healthcare: Yes    Advanced directive: Yes      Cognitive Screening:   Provider or family/friend/caregiver concerned regarding cognition?: No    PREVENTIVE SCREENINGS      Cardiovascular Screening:    General: Screening Not Indicated and History Lipid Disorder      Diabetes Screening:     General: Screening Not Indicated and History Diabetes      Colorectal Cancer Screening:     General: Screening Current      Cervical Cancer Screening:    General: Screening Not Indicated      Lung Cancer Screening:     General: Screening Not Indicated and History Lung Cancer      Hepatitis C Screening:    General: Screening Current    Screening, Brief Intervention, and Referral to Treatment (SBIRT)    Screening  Typical number of drinks in a day: 0  Typical number of drinks in a week: 0  Interpretation: Low risk drinking behavior  Single Item Drug Screening:  How often have you used an illegal drug (including marijuana) or a prescription medication for non-medical reasons in the past year? never    Single Item Drug Screen Score: 0  Interpretation: Negative screen for possible drug use disorder    No results found  Physical Exam:     /60 (BP Location: Left arm, Patient Position: Sitting, Cuff Size: Large)   Pulse 69   Temp (!) 96 °F (35 6 °C) (Tympanic)   Resp 16   Ht 5' 2 21\" (1 58 m)   Wt 83 7 kg (184 lb 9 6 oz)   LMP  (LMP Unknown)   SpO2 99%   BMI 33 54 kg/m²     Physical Exam  Vitals and nursing note reviewed     Constitutional:  " Appearance: Normal appearance  She is well-developed  HENT:      Head: Normocephalic and atraumatic  Right Ear: External ear normal       Left Ear: External ear normal       Nose: Nose normal    Eyes:      Extraocular Movements: Extraocular movements intact  Conjunctiva/sclera: Conjunctivae normal       Pupils: Pupils are equal, round, and reactive to light  Cardiovascular:      Rate and Rhythm: Normal rate and regular rhythm  Pulses: Normal pulses  Heart sounds: Normal heart sounds  Pulmonary:      Effort: Pulmonary effort is normal       Breath sounds: Normal breath sounds  Abdominal:      General: Bowel sounds are normal       Palpations: Abdomen is soft  Musculoskeletal:         General: Normal range of motion  Cervical back: Normal range of motion and neck supple  Skin:     General: Skin is warm and dry  Capillary Refill: Capillary refill takes less than 2 seconds  Neurological:      General: No focal deficit present  Mental Status: She is alert and oriented to person, place, and time  Psychiatric:         Mood and Affect: Mood normal          Behavior: Behavior normal          Thought Content:  Thought content normal          Judgment: Judgment normal           Damien Staff, DO

## 2023-06-01 ENCOUNTER — APPOINTMENT (OUTPATIENT)
Dept: LAB | Facility: AMBULARY SURGERY CENTER | Age: 73
End: 2023-06-01
Payer: MEDICARE

## 2023-06-01 ENCOUNTER — HOSPITAL ENCOUNTER (OUTPATIENT)
Dept: CT IMAGING | Facility: HOSPITAL | Age: 73
Discharge: HOME | End: 2023-06-01

## 2023-06-01 DIAGNOSIS — C34.90 ADENOCARCINOMA OF LUNG, STAGE 4, UNSPECIFIED LATERALITY (HCC): ICD-10-CM

## 2023-06-01 LAB
ALBUMIN SERPL BCP-MCNC: 3 G/DL (ref 3.5–5)
ALP SERPL-CCNC: 94 U/L (ref 46–116)
ALT SERPL W P-5'-P-CCNC: 14 U/L (ref 12–78)
ANION GAP SERPL CALCULATED.3IONS-SCNC: 2 MMOL/L (ref 4–13)
AST SERPL W P-5'-P-CCNC: 12 U/L (ref 5–45)
BASOPHILS # BLD AUTO: 0.04 THOUSANDS/ÂΜL (ref 0–0.1)
BASOPHILS NFR BLD AUTO: 1 % (ref 0–1)
BILIRUB SERPL-MCNC: 0.33 MG/DL (ref 0.2–1)
BUN SERPL-MCNC: 23 MG/DL (ref 5–25)
CALCIUM ALBUM COR SERPL-MCNC: 10.3 MG/DL (ref 8.3–10.1)
CALCIUM SERPL-MCNC: 9.5 MG/DL (ref 8.3–10.1)
CHLORIDE SERPL-SCNC: 111 MMOL/L (ref 96–108)
CO2 SERPL-SCNC: 24 MMOL/L (ref 21–32)
CREAT SERPL-MCNC: 1.57 MG/DL (ref 0.6–1.3)
EOSINOPHIL # BLD AUTO: 0.22 THOUSAND/ÂΜL (ref 0–0.61)
EOSINOPHIL NFR BLD AUTO: 3 % (ref 0–6)
ERYTHROCYTE [DISTWIDTH] IN BLOOD BY AUTOMATED COUNT: 14.6 % (ref 11.6–15.1)
GFR SERPL CREATININE-BSD FRML MDRD: 32 ML/MIN/1.73SQ M
GLUCOSE P FAST SERPL-MCNC: 211 MG/DL (ref 65–99)
HCT VFR BLD AUTO: 36.1 % (ref 34.8–46.1)
HGB BLD-MCNC: 10.9 G/DL (ref 11.5–15.4)
IMM GRANULOCYTES # BLD AUTO: 0.02 THOUSAND/UL (ref 0–0.2)
IMM GRANULOCYTES NFR BLD AUTO: 0 % (ref 0–2)
LYMPHOCYTES # BLD AUTO: 0.75 THOUSANDS/ÂΜL (ref 0.6–4.47)
LYMPHOCYTES NFR BLD AUTO: 12 % (ref 14–44)
MCH RBC QN AUTO: 26.1 PG (ref 26.8–34.3)
MCHC RBC AUTO-ENTMCNC: 30.2 G/DL (ref 31.4–37.4)
MCV RBC AUTO: 86 FL (ref 82–98)
MONOCYTES # BLD AUTO: 0.28 THOUSAND/ÂΜL (ref 0.17–1.22)
MONOCYTES NFR BLD AUTO: 4 % (ref 4–12)
NEUTROPHILS # BLD AUTO: 5.22 THOUSANDS/ÂΜL (ref 1.85–7.62)
NEUTS SEG NFR BLD AUTO: 80 % (ref 43–75)
NRBC BLD AUTO-RTO: 0 /100 WBCS
PLATELET # BLD AUTO: 240 THOUSANDS/UL (ref 149–390)
PMV BLD AUTO: 10.8 FL (ref 8.9–12.7)
POTASSIUM SERPL-SCNC: 4.5 MMOL/L (ref 3.5–5.3)
PROT SERPL-MCNC: 6.4 G/DL (ref 6.4–8.4)
RBC # BLD AUTO: 4.18 MILLION/UL (ref 3.81–5.12)
SODIUM SERPL-SCNC: 137 MMOL/L (ref 135–147)
WBC # BLD AUTO: 6.53 THOUSAND/UL (ref 4.31–10.16)

## 2023-06-01 PROCEDURE — 36415 COLL VENOUS BLD VENIPUNCTURE: CPT

## 2023-06-01 PROCEDURE — 80053 COMPREHEN METABOLIC PANEL: CPT

## 2023-06-01 PROCEDURE — 85025 COMPLETE CBC W/AUTO DIFF WBC: CPT

## 2023-06-05 DIAGNOSIS — E11.9 CONTROLLED TYPE 2 DIABETES MELLITUS WITHOUT COMPLICATION, WITHOUT LONG-TERM CURRENT USE OF INSULIN (HCC): ICD-10-CM

## 2023-06-08 ENCOUNTER — OFFICE VISIT (OUTPATIENT)
Dept: HEMATOLOGY ONCOLOGY | Facility: CLINIC | Age: 73
End: 2023-06-08
Payer: MEDICARE

## 2023-06-08 VITALS
BODY MASS INDEX: 34.04 KG/M2 | HEIGHT: 62 IN | DIASTOLIC BLOOD PRESSURE: 78 MMHG | HEART RATE: 66 BPM | OXYGEN SATURATION: 99 % | SYSTOLIC BLOOD PRESSURE: 140 MMHG | WEIGHT: 185 LBS | TEMPERATURE: 97.5 F

## 2023-06-08 DIAGNOSIS — C34.92 ADENOCARCINOMA OF LEFT LUNG, STAGE 4 (HCC): Primary | ICD-10-CM

## 2023-06-08 PROCEDURE — 99214 OFFICE O/P EST MOD 30 MIN: CPT | Performed by: INTERNAL MEDICINE

## 2023-06-08 NOTE — PROGRESS NOTES
Hematology Outpatient Follow - Up Note  Justino Griffith 74 y o  female MRN: @ Encounter: [id number]        Date:  6/8/2023        Assessment/ Plan:    Stage 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 of the lung with left scapula involvement diagnosed on 08/2016 PDL expression more than 50%, negative for EGFR mutation, ALK  rearrangement, Ros1 mutation     Treated initially with Pembrolizumab complicated with pneumonitis and later on nivolumab with prednisone 10 mg p o   Daily with excellent response      2   Disease progression in August 2018 in the left scapula with pain no new lesions by PET scan   Status post radiation therapy to the left scapula and treated with Alimta 500 milligram/meter squared, carboplatin AUC 5     After 3 cycles,  CT scan in January 2019 showed stable disease, and she was placed on maintenance Alimta 500 milligram/meter squared every 3 weeks         Alimta dose was reduced to 400 milligram/meter IV due to elevated Creatinine and  then Pemetrexed dose was reduced to 300 milligram/meter squared every 4 weeks    Cycle #38  Alimta was 6/22/21          3   Progression of disease 7/2021         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%   (MAP2K1 mutations are mutually exclusive with BRAF mutations)  There are FDA approved therapies indicated for other disease states such as binimetinib, cobimetinib, selumetinib, trametinib   Given the very small (0 1%) DNA amplification, use of these medications off label are likely to offer minimal benefit        Nivolumab 240 mg flat dose every 3 weeks and  carboplatin AUC 5 added to  pemetrexed which was dose reduced to 250 milligram/meter squared, initiated 8/2/2021     4   Progression of disease   Treatment changed to Taxotere 75mg/m2 10/2021  Bubba Villanueva added with cycle 2 11/2021      5   Right basal ganglia and right ischemic CVA on 01/2022 with hospitalization and rehab which may have been secondary to Cyramza     6   Therapy changed to Navelbine 30 mg per m2 every other week since 02/02/2022  Progression of disease on CAT scan 1/8/2022, initiated on palliative sotorasib 960 mg p o  daily with subsequent CAT scan showed significant decrease in the last CAT scan in June 2023, continue treatment and follow-up in 2 months with CBC, CMP and CAT scan in 4-month        Labs and imaging studies are reviewed by ordering provider once results are available  If there are findings that need immediate attention, you will be contacted when results available  Discussing results and the implication on your healthcare is best discussed in person at your follow-up visit  HPI:    Alton Lehman was admitted to the hospital with arrhythmia and was found to have right lower lobe infiltrate in August 2016   She was treated with antibiotics however repeat chest x-ray showed persistent right lower lobe infiltrate  Subsequently the patient had a CT scan of the chest which showed a right perihilar mass, subcarinal lymphadenopathy, lytic lesion of the right costovertebral junction at T10 level   PET scan October 2016 showed a right perihilar mass measuring 3 5 cm with SUV of 8 9, subcarinal lymph nodes measuring 3 4 x 2 2 cm with SUV of 9 2  Nodule in the left upper lobe lung measured 2 x 1 1 cm   There was a lytic lesion involving the right 10th costovertebral junction with SUV of 14  4      Biopsy showed non-small cell carcinoma with features suggesting of adenocarcinoma positive for CK 7, CK 19, CA-19-9, ANEUDY-3, partially positive for P40, p63, negative for TTF-1   She had a history of uterine cancer in 2000 status post hysterectomy and did not require radiation or chemotherapy   She is status post bilateral oophorectomy, right knee replacement,   tonsillectomy       She used to smoke for 35 years 1 pack per day however quit smoking 21 years ago   She used hormonal replacement therapy for 3 years  lBaire Kimbrough has a family history significant for skin cancer in her father and coronary artery disease in mother  Grady Burnett has 2 healthy children      Treated initially with Pembrolizumab December 2016, Finished in May 2017 secondary to grade 3 pneumonitis  Initiated on prednisone     Progression: Nivolumab 240 mg flat dose every 2 weeks along with prednisone 10 mg p o  Daily initiated April 2018- October 2018 with excellent response      Liquid biopsy showed K-MADHURI mutation G12C, no evidence of MSI high        Disease progression in August 2018 in the left scapula with pain no new lesions by PET scan   Status post radiation therapy to the left scapula and treated with Alimta 500 milligram/meter squared, carboplatin AUC 5   After 3 cycles,  CT scan in January 2019 showed stable disease  Carbo discontinued 3/2019    Maintenance Alimta 500 milligram/meter squared every 3 weeks initiated 3/2019        Cr 2/20 was 1 5   Plan was to dose reduce Alimta to 400mg/m2; however cr 2 16 2/24/20 and Alimta was held       3/4/20:  renal u/s  Minimal fullness of the left renal collecting system without matthieu hydronephrosis     Chronic right kidney lower pole cortical scar, with adjacent parenchymal calcification measuring 5 mm      She was on prednisone 5 mg p o  daily because of previous history of pneumonitis  CT scan chest 1/3/2020 showed no evidence of infiltration in the lung parenchyma, prednisone was reduced every other day for 1 month and then discontinued        Progression of disease 7/2021        Nivolumab 240 mg flat dose every 3 weeks and carboplatin AUC 5 added to pemetrexed which was dose reduced to 250 milligram/meter squared, initiated 8/2/2021     Progression of disease   Treatment changed to Taxotere 75mg/m2 10/2021  [place] Sylvain added with cycle 2 11/2021      Right basal ganglia and right ischemic CVA on 01/2022 with hospitalization and rehab which may have been secondary to [address] changed to Navelbine 30 mg per m2 every other week since 02/02/2022 8/30/22- CT chest - Enlarged medial right lower lobe spiculated opacity now measuring 3 8 x 1 9 cm previously measured 2 1 x 1 2 suspicious for malignancy         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        9/2022 sotorasib 960 mg in the morning without food initiated with good tolerance     CT scan on 2/2023 showed minimal enlargement since November 14, 2022  however no new lesions  right lower lobe paraspinal mass  was 3 8 x 1 9 cm on April 18, 2022, 4 1 x 2 3 cm on November 14, 2022, and now measures approximately 4 1 x 2 8 cm    CT scan on June 2023 showed decrease in the right lower lobe paraspinal mass, significant decrease in the pancreatic body mass  Interval History:        Previous Treatment:         Test Results:    Imaging: CT chest abdomen pelvis wo contrast    Result Date: 6/1/2023  Narrative: CT CHEST, ABDOMEN AND PELVIS WITHOUT IV CONTRAST INDICATION:   C34 90: Malignant neoplasm of unspecified part of unspecified bronchus or lung  Growing right lower lobe nodule  COMPARISON:  None  TECHNIQUE: CT examination of the chest, abdomen and pelvis was performed without intravenous contrast  Multiplanar 2D reformatted images were created from the source data  This examination, like all CT scans performed in the [facility], was performed utilizing techniques to minimize radiation dose exposure, including the use of iterative reconstruction and automated exposure control  Radiation dose length product (DLP) for this visit:  951 mGy-cm Enteric contrast was administered  FINDINGS: CHEST: LUNGS: Medial left lower lobe nodule measures 2 7 x 3 3 x 3 8 cm, previously 2 4 x 4 8 x 3 7 cm when measured in a similar fashion  The inferior margin of the mass is inseparable from the right hemidiaphragm; this appearance is unchanged  The mass now abuts posterior margin of the IVC which was not evident on the prior study, but this may be technical due to suboptimal inspiratory effort   New linear subpleural density, posterior right upper lobe, 6 x 3 mm, possibly minimal scarring  Stable post radiation fibrosis in the left upper lobe, unchanged  Unchanged scarring along the undersurface of the minor fissure in the right middle lobe laterally  Unchanged 9 mm nodule abutting the left pericardium since at least September 2021, image 126 series 4  Unchanged 0 7 cm groundglass nodule, right upper lobe, image 94 series 4  AIRWAYS: Patent tracheobronchial tree  PLEURA:  Unremarkable  HEART/GREAT VESSELS:  Normal heart size  Mild coronary artery calcification indicating atherosclerotic heart disease  Right port catheter terminating at cavoatrial junction  MEDIASTINUM AND ANABEL:  No mediastinal or hilar lymphadenopathy  CHEST WALL AND LOWER NECK: Unremarkable  ABDOMEN LIVER/BILIARY TREE:  One or more subcentimeter sharply circumscribed low-density hepatic lesion(s) are noted, too small to accurately characterize, but statistically most likely to represent subcentimeter hepatic cysts  No suspicious solid hepatic lesion is identified  Hepatic contours are normal   No biliary dilatation  GALLBLADDER:  Gallbladder is surgically absent  SPLEEN:  Unremarkable  PANCREAS: The previously evident 2 8 cm pancreatic body cyst has significantly decreased in size and conspicuity, now measuring about 1 8 cm  Otherwise unremarkable  ADRENAL GLANDS:  Unremarkable  KIDNEYS/URETERS:  Right renal cortical scarring with focus of calcification  Left kidney is unremarkable  STOMACH AND BOWEL: No obstruction or gross inflammatory process  Sigmoid diverticulosis  APPENDIX:  No findings to suggest appendicitis  ABDOMINOPELVIC CAVITY:  No ascites  No pneumoperitoneum  No lymphadenopathy  VESSELS:  Unremarkable for patient's age  PELVIS REPRODUCTIVE ORGANS:  Surgical changes of prior hysterectomy  URINARY BLADDER:  Unremarkable  ABDOMINAL WALL/INGUINAL REGIONS: Postsurgical changes in the left inguinal region   OSSEOUS STRUCTURES: Again noted pathologic "fracture of the anterolateral left 3rd rib without interval healing  Sclerosis in the left scapula and in the trabecular anterior left 2nd rib most consistent with the sequela of radiation therapy  Sclerotic lesion involving the right 10th rib at the costovertebral junction is again noted  Mild degenerative disease in the spine  Impression: Evaluation limited by suboptimal inspiration, changing configuration of the right lower lobe mass  Judging by calculated volume, there has been mild decrease in size (16 mL from 20 mL)  The mass now abuts the posterior IVC but this may be technical  If clinically warranted, consider PET/CT as a more sensitive modality for viable tumor  New 6 x 3 mm right upper lobe subpleural nodule, possibly minimal scarring  Consider follow-up in 3 months  Significant decrease in size and conspicuity of the pancreatic cyst, now 1 8 cm from previously 2 8 cm  Correlate for interval intervention  If none, this may reflect spontaneous resolution of a pseudocyst  Otherwise no significant interval changes  Chronic findings, as above  Workstation performed: [id number]       Labs:   Lab Results   Component Value Date    HCT 36 1 06/01/2023    HGB 10 9 (L) 06/01/2023    MCV 86 06/01/2023     06/01/2023    WBC 6 53 06/01/2023     Lab Results   Component Value Date    ALKPHOS 94 06/01/2023    ALT 14 06/01/2023    ANIONGAP 9 11/23/2015    AST 12 06/01/2023    BILITOT 0 65 11/23/2015    BUN 23 06/01/2023    CALCIUM 9 5 06/01/2023     (H) 06/01/2023    CO2 24 06/01/2023    CORRECTEDCA 10 3 (H) 06/01/2023    CREATININE 1 57 (H) 06/01/2023    EGFR 32 06/01/2023    GLUCOSE 98 01/03/2022    GLUF 211 (H) 06/01/2023    K 4 5 06/01/2023     11/23/2015    PROT 6 8 11/23/2015       Lab Results   Component Value Date    FERRITIN 49 02/16/2022    IRON 79 02/16/2022    TIBC 320 02/16/2022       No results found for: \"[id number]\"      ROS: Review of Systems   Constitutional: Positive for fatigue   " Negative for appetite change, chills, diaphoresis and unexpected weight change  HENT:   Negative for mouth sores, nosebleeds, sore throat, trouble swallowing and voice change  Eyes: Negative for eye problems and icterus  Respiratory: Negative for chest tightness, cough, hemoptysis and wheezing  Cardiovascular: Negative for chest pain, leg swelling and palpitations  Gastrointestinal: Positive for constipation  Negative for abdominal distention, abdominal pain, blood in stool, diarrhea, nausea and vomiting  Endocrine: Negative for hot flashes  Genitourinary: Negative for bladder incontinence, difficulty urinating, dyspareunia, dysuria and frequency  Musculoskeletal: Negative for arthralgias, back pain, gait problem, neck pain and neck stiffness  Skin: Negative for itching and rash  Neurological: Negative for dizziness, gait problem, headaches, numbness, seizures and speech difficulty  Hematological: Negative for adenopathy  Does not bruise/bleed easily  Psychiatric/Behavioral: Negative for decreased concentration, depression, sleep disturbance and suicidal ideas  The patient is not nervous/anxious  Current Medications: Reviewed  Allergies: Reviewed  PMH/FH/SH:  Reviewed      Physical Exam:    Body surface area is 1 85 meters squared  Wt Readings from Last 3 Encounters:   06/08/23 83 9 kg (185 lb)   05/16/23 83 7 kg (184 lb 9 6 oz)   05/08/23 83 2 kg (183 lb 8 oz)        Temp Readings from Last 3 Encounters:   06/08/23 97 5 °F (36 4 °C) (Temporal)   05/16/23 (!) 96 °F (35 6 °C) (Tympanic)   05/08/23 (!) 96 7 °F (35 9 °C) (Temporal)        BP Readings from Last 3 Encounters:   06/08/23 140/78   05/16/23 120/60   05/08/23 120/70         Pulse Readings from Last 3 Encounters:   06/08/23 66   05/16/23 69   05/08/23 64        Physical Exam  Vitals reviewed  Constitutional:       General: She is not in acute distress  Appearance: She is well-developed  She is ill-appearing   She is not diaphoretic  HENT:      Head: Normocephalic and atraumatic  Eyes:      Conjunctiva/sclera: Conjunctivae normal    Neck:      Trachea: No tracheal deviation  Cardiovascular:      Rate and Rhythm: Normal rate and regular rhythm  Heart sounds: No murmur heard  No friction rub  No gallop  Pulmonary:      Effort: Pulmonary effort is normal  No respiratory distress  Breath sounds: Normal breath sounds  No wheezing or rales  Chest:      Chest wall: No tenderness  Abdominal:      General: There is no distension  Palpations: Abdomen is soft  Tenderness: There is no abdominal tenderness  Musculoskeletal:      Cervical back: Normal range of motion and neck supple  Lymphadenopathy:      Cervical: No cervical adenopathy  Skin:     General: Skin is warm and dry  Coloration: Skin is not pale  Findings: No erythema  Neurological:      Mental Status: She is alert and oriented to person, place, and time  Psychiatric:         Behavior: Behavior normal          Thought Content: Thought content normal          Judgment: Judgment normal          ECO  Goals and Barriers:  Current Goal: Minimize effects of disease  Barriers: None  Patient's Capacity to Self Care:  Patient is able to self care      Code Status: @[facility]@

## 2023-07-10 ENCOUNTER — TELEPHONE (OUTPATIENT)
Dept: HEMATOLOGY ONCOLOGY | Facility: CLINIC | Age: 73
End: 2023-07-10

## 2023-07-10 ENCOUNTER — TELEPHONE (OUTPATIENT)
Dept: FAMILY MEDICINE CLINIC | Facility: CLINIC | Age: 73
End: 2023-07-10

## 2023-07-10 ENCOUNTER — APPOINTMENT (OUTPATIENT)
Dept: LAB | Facility: AMBULARY SURGERY CENTER | Age: 73
End: 2023-07-10
Payer: MEDICARE

## 2023-07-10 DIAGNOSIS — C34.92 ADENOCARCINOMA OF LEFT LUNG, STAGE 4 (HCC): ICD-10-CM

## 2023-07-10 DIAGNOSIS — R19.7 DIARRHEA OF PRESUMED INFECTIOUS ORIGIN: Primary | ICD-10-CM

## 2023-07-10 DIAGNOSIS — M89.8X9 BONE PAIN: ICD-10-CM

## 2023-07-10 DIAGNOSIS — C41.9 MALIGNANT NEOPLASM OF BONE WITH METASTASES (HCC): Primary | ICD-10-CM

## 2023-07-10 LAB
ALBUMIN SERPL BCP-MCNC: 3.2 G/DL (ref 3.5–5)
ALP SERPL-CCNC: 101 U/L (ref 46–116)
ALT SERPL W P-5'-P-CCNC: 12 U/L (ref 12–78)
ANION GAP SERPL CALCULATED.3IONS-SCNC: 3 MMOL/L
AST SERPL W P-5'-P-CCNC: 12 U/L (ref 5–45)
BASOPHILS # BLD AUTO: 0.05 THOUSANDS/ÂΜL (ref 0–0.1)
BASOPHILS NFR BLD AUTO: 1 % (ref 0–1)
BILIRUB SERPL-MCNC: 0.32 MG/DL (ref 0.2–1)
BUN SERPL-MCNC: 18 MG/DL (ref 5–25)
CALCIUM ALBUM COR SERPL-MCNC: 10.1 MG/DL (ref 8.3–10.1)
CALCIUM SERPL-MCNC: 9.5 MG/DL (ref 8.3–10.1)
CHLORIDE SERPL-SCNC: 110 MMOL/L (ref 96–108)
CO2 SERPL-SCNC: 25 MMOL/L (ref 21–32)
CREAT SERPL-MCNC: 1.31 MG/DL (ref 0.6–1.3)
EOSINOPHIL # BLD AUTO: 0.11 THOUSAND/ÂΜL (ref 0–0.61)
EOSINOPHIL NFR BLD AUTO: 1 % (ref 0–6)
ERYTHROCYTE [DISTWIDTH] IN BLOOD BY AUTOMATED COUNT: 14.3 % (ref 11.6–15.1)
GFR SERPL CREATININE-BSD FRML MDRD: 39 ML/MIN/1.73SQ M
GLUCOSE SERPL-MCNC: 124 MG/DL (ref 65–140)
HCT VFR BLD AUTO: 37.6 % (ref 34.8–46.1)
HGB BLD-MCNC: 11.2 G/DL (ref 11.5–15.4)
IMM GRANULOCYTES # BLD AUTO: 0.03 THOUSAND/UL (ref 0–0.2)
IMM GRANULOCYTES NFR BLD AUTO: 0 % (ref 0–2)
LYMPHOCYTES # BLD AUTO: 1.11 THOUSANDS/ÂΜL (ref 0.6–4.47)
LYMPHOCYTES NFR BLD AUTO: 11 % (ref 14–44)
MCH RBC QN AUTO: 25.3 PG (ref 26.8–34.3)
MCHC RBC AUTO-ENTMCNC: 29.8 G/DL (ref 31.4–37.4)
MCV RBC AUTO: 85 FL (ref 82–98)
MONOCYTES # BLD AUTO: 0.53 THOUSAND/ÂΜL (ref 0.17–1.22)
MONOCYTES NFR BLD AUTO: 5 % (ref 4–12)
NEUTROPHILS # BLD AUTO: 8.29 THOUSANDS/ÂΜL (ref 1.85–7.62)
NEUTS SEG NFR BLD AUTO: 82 % (ref 43–75)
NRBC BLD AUTO-RTO: 0 /100 WBCS
PLATELET # BLD AUTO: 253 THOUSANDS/UL (ref 149–390)
PMV BLD AUTO: 12.1 FL (ref 8.9–12.7)
POTASSIUM SERPL-SCNC: 4.6 MMOL/L (ref 3.5–5.3)
PROT SERPL-MCNC: 6.8 G/DL (ref 6.4–8.4)
RBC # BLD AUTO: 4.43 MILLION/UL (ref 3.81–5.12)
SODIUM SERPL-SCNC: 138 MMOL/L (ref 135–147)
WBC # BLD AUTO: 10.12 THOUSAND/UL (ref 4.31–10.16)

## 2023-07-10 PROCEDURE — 80053 COMPREHEN METABOLIC PANEL: CPT

## 2023-07-10 PROCEDURE — 85025 COMPLETE CBC W/AUTO DIFF WBC: CPT

## 2023-07-10 PROCEDURE — 36415 COLL VENOUS BLD VENIPUNCTURE: CPT

## 2023-07-10 NOTE — TELEPHONE ENCOUNTER
Patient Call    Who are you speaking with? Patient    If it is not the patient, are they listed on an active communication consent form? N/A   What is the reason for this call? Patient would like to discuss her symptoms of diarrhea with somebody. The patient states Imodium is not helping her and she would like to know what else she can do. Does this require a call back? Yes   If a call back is required, please list best call back number [phone]   If a call back is required, advise that a message will be forwarded to their care team and someone will return their call as soon as possible. Did you relay this information to the patient?  Yes

## 2023-07-10 NOTE — TELEPHONE ENCOUNTER
PET CT for new onset bone pain. Pt called and notified about stool sample and vanco. Pt repots having prescription at home. Pt is agreeable to PET CT. Cameron Expose, can you please schedule PET CT.

## 2023-07-10 NOTE — TELEPHONE ENCOUNTER
Spoke with patient, she reports diarrhea x 5 days unrelieved with imodium. Pt reports going about 5 times a day. Pt reports hydrating and eating adequately. Pt has hx of C-diff infection. Pt reports new onset of L hip and knee pain, and R lower back pain. Pain improves with Tylenol, onset about two weeks ago. Per Dr. Gisela Baldwin, c-diff stool sample and start vancomycin 125 mg four times a day for 10 days.

## 2023-07-10 NOTE — TELEPHONE ENCOUNTER
Patient called and left voicemail, copied below,     Hi, this is Will Buchanan [id number]. I am having diarrhea for a week now and I do have cancer and I don't have a fever but I do have aches so I just don't know what to do. If you can give me a call back with directions, I'd only be too happy to take them. Thank you very much. Bye bye. Called patient back and advised her start with her oncologist to first to see if they want her to do anything. Advised to calls us back if they can't see or do anything for her.

## 2023-07-11 ENCOUNTER — PATIENT MESSAGE (OUTPATIENT)
Dept: PALLIATIVE MEDICINE | Facility: CLINIC | Age: 73
End: 2023-07-11

## 2023-07-11 DIAGNOSIS — G62.0 NEUROPATHY DUE TO CHEMOTHERAPEUTIC DRUG (HCC): ICD-10-CM

## 2023-07-11 DIAGNOSIS — M25.50 JOINT PAIN: ICD-10-CM

## 2023-07-11 DIAGNOSIS — T45.1X5A NEUROPATHY DUE TO CHEMOTHERAPEUTIC DRUG (HCC): ICD-10-CM

## 2023-07-11 DIAGNOSIS — Z51.5 PALLIATIVE CARE PATIENT: ICD-10-CM

## 2023-07-11 DIAGNOSIS — C34.90 ADENOCARCINOMA OF LUNG, STAGE 4, UNSPECIFIED LATERALITY (HCC): ICD-10-CM

## 2023-07-11 DIAGNOSIS — F32.5 MAJOR DEPRESSIVE DISORDER WITH SINGLE EPISODE, IN FULL REMISSION (HCC): ICD-10-CM

## 2023-07-11 DIAGNOSIS — F41.9 ANXIETY: ICD-10-CM

## 2023-07-12 RX ORDER — GABAPENTIN 100 MG/1
200 CAPSULE ORAL 2 TIMES DAILY
Qty: 360 CAPSULE | Refills: 0 | Status: SHIPPED | OUTPATIENT
Start: 2023-07-12

## 2023-07-12 NOTE — TELEPHONE ENCOUNTER
VM left for patient asking for an update to make sure her diarrhea is still resolved. Call back provided.

## 2023-07-12 NOTE — PATIENT COMMUNICATION
Called patient. Likely non-cancer pain though PET scan pending. Has not been taking gabapentin recently, will ramp up to 200mg BID ATC.

## 2023-07-15 ENCOUNTER — APPOINTMENT (OUTPATIENT)
Dept: LAB | Facility: CLINIC | Age: 73
End: 2023-07-15
Payer: MEDICARE

## 2023-07-15 DIAGNOSIS — G62.0 NEUROPATHY DUE TO CHEMOTHERAPEUTIC DRUG (HCC): ICD-10-CM

## 2023-07-15 DIAGNOSIS — M25.50 JOINT PAIN: ICD-10-CM

## 2023-07-15 DIAGNOSIS — F32.5 MAJOR DEPRESSIVE DISORDER WITH SINGLE EPISODE, IN FULL REMISSION (HCC): ICD-10-CM

## 2023-07-15 DIAGNOSIS — T45.1X5A NEUROPATHY DUE TO CHEMOTHERAPEUTIC DRUG (HCC): ICD-10-CM

## 2023-07-15 DIAGNOSIS — F41.9 ANXIETY: ICD-10-CM

## 2023-07-15 DIAGNOSIS — R19.7 DIARRHEA OF PRESUMED INFECTIOUS ORIGIN: ICD-10-CM

## 2023-07-15 DIAGNOSIS — C34.90 ADENOCARCINOMA OF LUNG, STAGE 4, UNSPECIFIED LATERALITY (HCC): ICD-10-CM

## 2023-07-15 DIAGNOSIS — Z51.5 PALLIATIVE CARE PATIENT: ICD-10-CM

## 2023-07-15 PROBLEM — Z00.00 MEDICARE ANNUAL WELLNESS VISIT, SUBSEQUENT: Status: RESOLVED | Noted: 2023-05-16 | Resolved: 2023-07-15

## 2023-07-15 PROCEDURE — 87493 C DIFF AMPLIFIED PROBE: CPT

## 2023-07-16 LAB
C DIFF TOX A+B STL QL IA: NEGATIVE
C DIFF TOX B TCDB STL QL NAA+PROBE: POSITIVE

## 2023-07-17 ENCOUNTER — TELEPHONE (OUTPATIENT)
Dept: OTHER | Facility: OTHER | Age: 73
End: 2023-07-17

## 2023-07-17 ENCOUNTER — TELEPHONE (OUTPATIENT)
Dept: HEMATOLOGY ONCOLOGY | Facility: CLINIC | Age: 73
End: 2023-07-17

## 2023-07-17 DIAGNOSIS — A04.72 C. DIFFICILE DIARRHEA: Primary | ICD-10-CM

## 2023-07-17 RX ORDER — VENLAFAXINE 37.5 MG/1
TABLET ORAL
Qty: 180 TABLET | Refills: 0 | Status: SHIPPED | OUTPATIENT
Start: 2023-07-17

## 2023-07-17 NOTE — TELEPHONE ENCOUNTER
Patient called and said her vanc po prescription was not received at pharmacy. I see the order is in. Called patient. She only had 1 episode of diarrhea and reports mild nausea. Her C. difficile toxin by PCR is positive. But C. difficile toxin AMB by EIA is negative. This is probable C. difficile colonization without active infection. I talked to primary oncology and they were okay with not starting the patient on vancomycin p.o. for now.   Patient will touch base with primary oncology office tomorrow AM.

## 2023-07-17 NOTE — TELEPHONE ENCOUNTER
Patient is requesting a call back from on call regarding her medication  vancomycin (VANCOCIN) 125 MG capsule pharmacy has not received yet. Please re send.     Paged on call provider

## 2023-07-17 NOTE — TELEPHONE ENCOUNTER
Per Dr. Jordan Iverson, positive C-diff results patient to start oral Vancomycin 125 mg QID for 10 days. Pt called and notified, she is agreeable to start. Script pended for signature. Pt reports her diarrhea came back.  Pt will hold imodium right now and call if no improvement in her diarrhea once starting the vanco.

## 2023-07-18 RX ORDER — VANCOMYCIN HYDROCHLORIDE 125 MG/1
125 CAPSULE ORAL 4 TIMES DAILY
Qty: 40 CAPSULE | Refills: 0 | Status: SHIPPED | OUTPATIENT
Start: 2023-07-18 | End: 2023-07-28

## 2023-07-19 ENCOUNTER — PATIENT MESSAGE (OUTPATIENT)
Dept: PALLIATIVE MEDICINE | Facility: CLINIC | Age: 73
End: 2023-07-19

## 2023-07-19 ENCOUNTER — TELEPHONE (OUTPATIENT)
Dept: HEMATOLOGY ONCOLOGY | Facility: CLINIC | Age: 73
End: 2023-07-19

## 2023-07-19 NOTE — TELEPHONE ENCOUNTER
Returned call to patient, reviewed that we sent in Vancomycin for her. She got confused with a prescription that was delivered from mail order. Gabapentin, from Dr. Mk Hinojosa. Reviewed medication with her. All questions answered.

## 2023-07-19 NOTE — TELEPHONE ENCOUNTER
Patient Call    Who are you speaking with? Patient    If it is not the patient, are they listed on an active communication consent form? Yes   What is the reason for this call? Patient wants to know what medication was sent    Does this require a call back? Yes   If a call back is required, please list best call back number [phone]   If a call back is required, advise that a message will be forwarded to their care team and someone will return their call as soon as possible. Did you relay this information to the patient?  Yes

## 2023-07-22 DIAGNOSIS — I48.91 ATRIAL FIBRILLATION, UNSPECIFIED TYPE (HCC): ICD-10-CM

## 2023-07-28 ENCOUNTER — TELEPHONE (OUTPATIENT)
Dept: HEMATOLOGY ONCOLOGY | Facility: CLINIC | Age: 73
End: 2023-07-28

## 2023-07-28 ENCOUNTER — HOSPITAL ENCOUNTER (OUTPATIENT)
Dept: RADIOLOGY | Age: 73
Discharge: HOME | End: 2023-07-28
Payer: MEDICARE

## 2023-07-28 DIAGNOSIS — C41.9 MALIGNANT NEOPLASM OF BONE WITH METASTASES (HCC): ICD-10-CM

## 2023-07-28 DIAGNOSIS — C34.92 ADENOCARCINOMA OF LEFT LUNG, STAGE 4 (HCC): ICD-10-CM

## 2023-07-28 LAB — GLUCOSE SERPL-MCNC: 138 MG/DL (ref 65–140)

## 2023-07-28 PROCEDURE — 78815 PET IMAGE W/CT SKULL-THIGH: CPT

## 2023-07-28 PROCEDURE — G1004 CDSM NDSC: HCPCS

## 2023-07-28 PROCEDURE — A9552 F18 FDG: HCPCS

## 2023-07-28 PROCEDURE — 82948 REAGENT STRIP/BLOOD GLUCOSE: CPT

## 2023-07-28 NOTE — LETTER
[address] [place] [id number]      August 2, 2023    MRN: [id number]     Phone: [phone]     Dear Ms. Griffith,    Bob recently had a(n) Nuclear Medicine performed on 7/28/2023 at  [address] [place] [address] that was requested by Abbi Gambino MD. The study was reviewed by a radiologist, which is a physician who specializes in medical imaging. The radiologist issued a report describing his or her findings. In that report there was a finding that the radiologist felt warranted further discussion with your health care provider and that discussion would be beneficial to you. The results were sent to Abbi Gambino MD on 07/28/2023  1:03 PM. We recommend that you contact Abbi Gambino MD at [phone] or set up an appointment to discuss the results of the imaging test. If you have already heard from Abbi Gambino MD regarding the results of your study, you can disregard this letter. This letter is not meant to alarm you, but intended to encourage you to follow-up on your results with the provider that sent you for the imaging study. In addition, we have enclosed answers to frequently asked questions by other patients who have also received a letter to review results with their health care provider (see page two). Thank you for choosing [address] [place] [address] for your medical imaging needs. FREQUENTLY ASKED QUESTIONS    Why am I receiving this letter? [facility] requires us to notify patients who have findings on imaging exams that may require more testing or follow-up with a health professional within the next 3 months. How serious is the finding on the imaging test?  This letter is sent to all patients who may need follow-up or more testing within the next 3 months. Receiving this letter does not necessarily mean you have a life-threatening imaging finding or disease. Recommendations in the radiologist’s imaging report are general in nature and it is up to your healthcare provider to say whether those recommendations make sense for your situation. You are strongly encouraged to talk to your health care provider about the results and ask whether additional steps need to be taken. Where can I get a copy of the final report for my recent radiology exam?  To get a full copy of the report you can access your records online at [email or web address] or please contact [facility] Medical Records Department at [phone] Monday through Friday between 8 am and 6 pm.         What do I need to do now? Please contact your health care provider who requested the imaging study to discuss what further actions (if any) are needed. You may have already heard from (your ordering provider) in regard to this test in which case you can disregard this letter. NOTICE IN ACCORDANCE WITH THE [place] STATE “PATIENT TEST RESULT INFORMATION ACT OF 2018”    You are receiving this notice as a result of a determination by your diagnostic imaging service that further discussions of your test results are warranted and would be beneficial to you. The complete results of your test or tests have been or will be sent to the health care practitioner that ordered the test or tests. It is recommended that you contact your health care practitioner to discuss your results as soon as possible.

## 2023-07-28 NOTE — TELEPHONE ENCOUNTER
Reading room calling with results of PET scan showing significant findings. Routing results to Dr. Tamiko Carrizales.

## 2023-07-31 ENCOUNTER — TELEPHONE (OUTPATIENT)
Dept: HEMATOLOGY ONCOLOGY | Facility: CLINIC | Age: 73
End: 2023-07-31

## 2023-07-31 NOTE — TELEPHONE ENCOUNTER
Dr. Colie Gilford called and spoke with the patient regarding her most recent PET scan results. Per Dr. Colie Gilford, PET scan showed some progression of the right lower lung lobe mass. Patient is currently scheduled on 9/1 with Viktor Cadena. Dr. Colie Gilford would like to have her seen before 9/1/23. Please schedule her and reach out to the patient with the new date. Thank you!      ----- Message from Denise Box sent at 7/28/2023  5:37 PM EDT -----  Regarding: FW: Recent pet scan  Contact: [phone]    ----- Message -----  From: Anh Last  Sent: 7/28/2023   5:04 PM EDT  To: Hematology Oncology [facility]  Subject: Recent pet scan                                  Dear Dr. Colie Gilford:   Britt Warner I CAN NOT TELL IF IT IS A GOOD, BAD, OR STABLE REPORT ON ME.  I AM LEVING ON VACATION IN THE MORNING AND WOULD NOT WANT TO WORRY FOR A WEEK. If you could tell me, I would greatly appreciate it.   Thanks, Plantiga

## 2023-08-08 ENCOUNTER — TELEPHONE (OUTPATIENT)
Dept: HEMATOLOGY ONCOLOGY | Facility: CLINIC | Age: 73
End: 2023-08-08

## 2023-08-08 ENCOUNTER — OFFICE VISIT (OUTPATIENT)
Dept: HEMATOLOGY ONCOLOGY | Facility: CLINIC | Age: 73
End: 2023-08-08
Payer: MEDICARE

## 2023-08-08 VITALS
HEIGHT: 62 IN | HEART RATE: 78 BPM | BODY MASS INDEX: 32.54 KG/M2 | SYSTOLIC BLOOD PRESSURE: 136 MMHG | OXYGEN SATURATION: 96 % | RESPIRATION RATE: 17 BRPM | TEMPERATURE: 96.1 F | DIASTOLIC BLOOD PRESSURE: 80 MMHG | WEIGHT: 176.8 LBS

## 2023-08-08 DIAGNOSIS — I10 PRIMARY HYPERTENSION: ICD-10-CM

## 2023-08-08 DIAGNOSIS — C34.92 ADENOCARCINOMA OF LEFT LUNG, STAGE 4 (HCC): Primary | ICD-10-CM

## 2023-08-08 PROCEDURE — 99214 OFFICE O/P EST MOD 30 MIN: CPT | Performed by: INTERNAL MEDICINE

## 2023-08-08 RX ORDER — VALSARTAN 160 MG/1
TABLET ORAL
Qty: 180 TABLET | Refills: 3 | Status: SHIPPED | OUTPATIENT
Start: 2023-08-08

## 2023-08-08 NOTE — PROGRESS NOTES
Hematology Outpatient Follow - Up Note  Maureen Griffith 74 y.o. female MRN: @ Encounter: [id number]        Date:  8/8/2023        Assessment/ Plan:    Stage 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 of the lung with left scapula involvement diagnosed on 08/2016 PDL expression more than 50%, negative for EGFR mutation, ALK  rearrangement, Ros1 mutation     Treated initially with Pembrolizumab complicated with pneumonitis and later on nivolumab with prednisone 10 mg p.o.  Daily with excellent response.     2.  Disease progression in August 2018 in the left scapula with pain no new lesions by PET scan.  Status post radiation therapy to the left scapula and treated with Alimta 500 milligram/meter squared, carboplatin AUC 5.    After 3 cycles,  CT scan in January 2019 showed stable disease, and she was placed on maintenance Alimta 500 milligram/meter squared every 3 weeks.        Alimta dose was reduced to 400 milligram/meter IV due to elevated Creatinine and  then Pemetrexed dose was reduced to 300 milligram/meter squared every 4 weeks.   Cycle #38  Alimta was 6/22/21.         3.  Progression of disease 7/2021.        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  (MAP2K1 mutations are mutually exclusive with BRAF mutations)  There are FDA approved therapies indicated for other disease states such as binimetinib, cobimetinib, selumetinib, trametinib.  Given the very small (0.1%) DNA amplification, use of these medications off label are likely to offer minimal benefit.       Nivolumab 240 mg flat dose every 3 weeks and  carboplatin AUC 5 added to  pemetrexed which was dose reduced to 250 milligram/meter squared, initiated 8/2/2021     4.  Progression of disease.  Treatment changed to Taxotere 75mg/m2 10/2021. Rivas Figures added with cycle 2 11/2021.     5.  Right basal ganglia and right ischemic CVA on 01/2022 with hospitalization and rehab which may have been secondary to Cyramza     6.  Therapy changed to Navelbine 30 mg per m2 every other week since 02/02/2022.     Progression of disease on CAT scan 1/8/2022, initiated on palliative sotorasib 960 mg p.o. daily with subsequent CAT scan showed significant decrease in the last CAT scan in June 2023, continue treatment and follow-up in 2 months with CBC, CMP and CAT scan in 4-month    PET scan in July 2023 showed uptake in the right paraspinal mass, otherwise stable disease    Patient has pain in the right thoracic spine, will refer the patient for palliative radiation therapy           Labs and imaging studies are reviewed by ordering provider once results are available. If there are findings that need immediate attention, you will be contacted when results available. Discussing results and the implication on your healthcare is best discussed in person at your follow-up visit.         HPI:    Mandeep Velásquez was admitted to the hospital with arrhythmia and was found to have right lower lobe infiltrate in August 2016.  She was treated with antibiotics however repeat chest x-ray showed persistent right lower lobe infiltrate. Subsequently the patient had a CT scan of the chest which showed a right perihilar mass, subcarinal lymphadenopathy, lytic lesion of the right costovertebral junction at T10 level.  PET scan October 2016 showed a right perihilar mass measuring 3.5 cm with SUV of 8.9, subcarinal lymph nodes measuring 3.4 x 2.2 cm with SUV of 9.2. Nodule in the left upper lobe lung measured 2 x 1.1 cm.  There was a lytic lesion involving the right 10th costovertebral junction with SUV of 14. 4.     Biopsy showed non-small cell carcinoma with features suggesting of adenocarcinoma positive for CK 7, CK 19, CA-19-9, ANEUDY-3, partially positive for P40, p63, negative for TTF-1.  She had a history of uterine cancer in 2000 status post hysterectomy and did not require radiation or chemotherapy.  She is status post bilateral oophorectomy, right knee replacement,   tonsillectomy.      She used to smoke for 35 years 1 pack per day however quit smoking 21 years ago.  She used hormonal replacement therapy for 3 years. Vaughn Levy has a family history significant for skin cancer in her father and coronary artery disease in mother. Vaughn Levy has 2 healthy children.     Treated initially with Pembrolizumab December 2016, Finished in May 2017 secondary to grade 3 pneumonitis. Initiated on prednisone.    Progression: Nivolumab 240 mg flat dose every 2 weeks along with prednisone 10 mg p.o. Daily initiated April 2018- October 2018 with excellent response.     Liquid biopsy showed K-MADHURI mutation G12C, no evidence of MSI high        Disease progression in August 2018 in the left scapula with pain no new lesions by PET scan.  Status post radiation therapy to the left scapula and treated with Alimta 500 milligram/meter squared, carboplatin AUC 5.  After 3 cycles,  CT scan in January 2019 showed stable disease. Carbo discontinued 3/2019.   Maintenance Alimta 500 milligram/meter squared every 3 weeks initiated 3/2019.       Cr 2/20 was 1.5.  Plan was to dose reduce Alimta to 400mg/m2; however cr 2.16 2/24/20 and Alimta was held.      3/4/20:  renal u/s  Minimal fullness of the left renal collecting system without matthieu hydronephrosis.    Chronic right kidney lower pole cortical scar, with adjacent parenchymal calcification measuring 5 mm      She was on prednisone 5 mg p.o. daily because of previous history of pneumonitis. CT scan chest 1/3/2020 showed no evidence of infiltration in the lung parenchyma, prednisone was reduced every other day for 1 month and then discontinued.       Progression of disease 7/2021.       Nivolumab 240 mg flat dose every 3 weeks and carboplatin AUC 5 added to pemetrexed which was dose reduced to 250 milligram/meter squared, initiated 8/2/2021     Progression of disease.  Treatment changed to Taxotere 75mg/m2 10/2021. Kiara Due added with cycle 2 11/2021.     Right basal ganglia and right ischemic CVA on 01/2022 with hospitalization and rehab which may have been secondary to [address] changed to Navelbine 30 mg per m2 every other week since 02/02/2022.      8/30/22- CT chest - Enlarged medial right lower lobe spiculated opacity now measuring 3.8 x 1.9 cm previously measured 2.1 x 1.2 suspicious for malignancy.        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 however no new lesions  right lower lobe paraspinal mass  was 3.8 x 1.9 cm on April 18, 2022, 4.1 x 2.3 cm on November 14, 2022, and now measures approximately 4.1 x 2.8 cm     CT scan on June 2023 showed decrease in the right lower lobe paraspinal mass, significant decrease in the pancreatic body mass        Labs and imaging studies are reviewed by ordering provider once results are available. If there are findings that need immediate attention, you will be contacted when results available. Discussing results and the implication on your healthcare is best discussed in person at your follow-up visit. HPI:    Fredrick Ferguson was admitted to the hospital with arrhythmia and was found to have right lower lobe infiltrate in August 2016.  She was treated with antibiotics however repeat chest x-ray showed persistent right lower lobe infiltrate. Subsequently the patient had a CT scan of the chest which showed a right perihilar mass, subcarinal lymphadenopathy, lytic lesion of the right costovertebral junction at T10 level.  PET scan October 2016 showed a right perihilar mass measuring 3.5 cm with SUV of 8.9, subcarinal lymph nodes measuring 3.4 x 2.2 cm with SUV of 9.2. Nodule in the left upper lobe lung measured 2 x 1.1 cm.  There was a lytic lesion involving the right 10th costovertebral junction with SUV of 14. 4.     Biopsy showed non-small cell carcinoma with features suggesting of adenocarcinoma positive for CK 7, CK 19, CA-19-9, ANEUDY-3, partially positive for P40, p63, negative for TTF-1.  She had a history of uterine cancer in 2000 status post hysterectomy and did not require radiation or chemotherapy.  She is status post bilateral oophorectomy, right knee replacement,   tonsillectomy.      She used to smoke for 35 years 1 pack per day however quit smoking 21 years ago.  She used hormonal replacement therapy for 3 years. [facility] has a family history significant for skin cancer in her father and coronary artery disease in mother. [facility] has 2 healthy children.     Treated initially with Pembrolizumab December 2016, Finished in May 2017 secondary to grade 3 pneumonitis. Initiated on prednisone.    Progression: Nivolumab 240 mg flat dose every 2 weeks along with prednisone 10 mg p.o. Daily initiated April 2018- October 2018 with excellent response.     Liquid biopsy showed K-MADHURI mutation G12C, no evidence of MSI high        Disease progression in August 2018 in the left scapula with pain no new lesions by PET scan.  Status post radiation therapy to the left scapula and treated with Alimta 500 milligram/meter squared, carboplatin AUC 5.  After 3 cycles,  CT scan in January 2019 showed stable disease. Carbo discontinued 3/2019.   Maintenance Alimta 500 milligram/meter squared every 3 weeks initiated 3/2019.       Cr 2/20 was 1.5.  Plan was to dose reduce Alimta to 400mg/m2; however cr 2.16 2/24/20 and Alimta was held.      3/4/20:  renal u/s  Minimal fullness of the left renal collecting system without matthieu hydronephrosis.    Chronic right kidney lower pole cortical scar, with adjacent parenchymal calcification measuring 5 mm      She was on prednisone 5 mg p.o. daily because of previous history of pneumonitis. CT scan chest 1/3/2020 showed no evidence of infiltration in the lung parenchyma, prednisone was reduced every other day for 1 month and then discontinued.       Progression of disease 7/2021.       Nivolumab 240 mg flat dose every 3 weeks and carboplatin AUC 5 added to pemetrexed which was dose reduced to 250 milligram/meter squared, initiated 8/2/2021     Progression of disease.  Treatment changed to Taxotere 75mg/m2 10/2021. Sd Chock added with cycle 2 11/2021.     Right basal ganglia and right ischemic CVA on 01/2022 with hospitalization and rehab which may have been secondary to [address] changed to Navelbine 30 mg per m2 every other week since 02/02/2022.      8/30/22- CT chest - Enlarged medial right lower lobe spiculated opacity now measuring 3.8 x 1.9 cm previously measured 2.1 x 1.2 suspicious for malignancy.        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 however no new lesions  right lower lobe paraspinal mass  was 3.8 x 1.9 cm on April 18, 2022, 4.1 x 2.3 cm on November 14, 2022, and now measures approximately 4.1 x 2.8 cm     CT scan on June 2023 showed decrease in the right lower lobe paraspinal mass, significant decrease in the pancreatic body mass          Previous Treatment:         Test Results:    Imaging: NM PET CT skull base to mid thigh    Result Date: 7/28/2023  Narrative: PET/CT SCAN INDICATION: C34.92: Malignant neoplasm of unspecified part of left bronchus or lung C41.9: 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, unspecified, restaging The following clinical information was obtained directly from EPIC: hx metastatic lung ca. restaging - pt c/o new onset of L hip and knee, R lower back pain. recent CT showed changing configuration of RLL mass, new 6 x 3mm RUL subpleural nodule, significant decrease in size and conspicuity of pancreatic cyst.  s/p L scapular bone bx 9/13/17 (+) metastatic adenocarcinoma; received RT 10/16/17 to L scapula.   s/p R mainstem bronchus bx 11/2016 (+) poorly differentiated non small cell ca. RT to L lung 4/2021. pt continues on navelbine. also hx endometrial ca dx 2000 s/p RICHARD/BSO.  prior appendectomy, cholecystectomy, tonsillectomy, TKR. MODIFIER: PS COMPARISON: PET/CT dated 7/14/2021, CT of chest, abdomen, and pelvis dated 6/1/2023, and CT of chest dated April 18, 2022 CELL TYPE:  non-small cell carcinoma w/features of adenocarcinoma (bx T10 bone 10/18/16) TECHNIQUE:   10.5 mCi F-18-FDG administered IV. Multiplanar attenuation corrected and non attenuation corrected PET images are available for interpretation, and contiguous, low dose, axial CT sections were obtained from the skull base through the femurs. Intravenous contrast material was not utilized. This examination, like all CT scans performed in the [facility], was performed utilizing techniques to minimize radiation dose exposure, including the use of iterative reconstruction and automated exposure control. Fasting serum glucose: 138 mg/dl FINDINGS: VISUALIZED BRAIN: No acute abnormalities are seen. HEAD/NECK: There is a physiologic distribution of FDG. No FDG avid cervical adenopathy is seen. CT images: Intracranial atherosclerotic calcification is noted. Near complete opacification of left ethmoid air cells. CHEST: Patchy hypermetabolic consolidation involving the left upper lobe has significantly improved with more platelike as opposed to patchy consolidation with minimal associated FDG activity with max SUV of approximately 1.9 (for example image 74) most consistent with posttreatment changes with viable underlying malignancy considered less likely but not excluded. Along the anterolateral and posterolateral aspects of the left chest wall and most prominently along the left anterolateral aspect of the chest wall is mildly FDG avid soft tissue of uncertain clinical significance.  For example, on image 77 anterior to the left third rib is mildly FDG avid soft tissue involving the left anterolateral chest wall measuring 3.7 x 1.6 cm with max SUV of 2.3, previously measuring 3.4 x 1.4 cm without significant FDG activity. This finding appears adjacent and superficial to  an irregular fracture of the anterolateral left third rib suggesting possible pathologic fracture although given the mild FDG activity findings could reflect benign inflammatory FDG activity. Best seen on image 710 is hypermetabolic masslike airspace consolidation/mass involving the medial right lower lobe at the lung base towards the retrocrural region measuring 4.7 x 2.0 cm with max SUV of 10.0; this hypermetabolic masslike consolidation appears medial to the site of previously noted right lower lobe hypermetabolic malignancy which previously measured 2.9 cm with max SUV of 7.9. This area of masslike consolidation measured approximately 2.5 x 1.2 cm on April 18, 2022. Differential diagnosis includes progression of hypermetabolic malignancy in this location versus posttreatment inflammatory changes. Progression of hypermetabolic malignancy is the diagnosis of exclusion. Consider further evaluation with tissue sampling as clinically  indicated. CT images: Right-sided chest port extends towards the caval atrial junction. Stable tiny nodular retroareolar left breast non-FDG avid densities. Atherosclerotic vascular calcifications including those of the coronary arteries are noted. Stable small bilateral lung nodules which were better characterized on dedicated CT of chest dated 6/1/2023 ABDOMEN: Patchy hypermetabolic mural thickening of the gastric wall for which correlation is recommended to exclude underlying gastric mucosal process. There is additional nonuniform FDG activity involving the bowel without definitive focality and therefore favored to be physiologic/inflammatory in etiology. Given patient's age, correlation is recommended to ensure that the patient is up-to-date with screening colonoscopy.  CT images: Small left hepatic hypodensity which was better characterized on recent dedicated patient's known pancreatic body cyst is poorly characterized on current low-dose unenhanced CT. Atherosclerotic vascular calcifications are noted. Diverticulosis  coli. CT. Status post cholecystectomy. Nonobstructing right renal calculus. PELVIS: No FDG avid soft tissue lesions are seen. CT images: Hysterectomy. OSSEOUS STRUCTURES: No FDG avid lesions are seen. CT images: Degenerative changes are noted involving the spine. Mild apex left scoliosis to the lumbar spine. Impression: 1. Hypermetabolic masslike consolidation involving the medial right lower lobe towards the right lung base/region of the diaphragm which has increased in size since April 18, 2022 and is more medial to the location of previously noted hypermetabolic right lower lobe malignancy seen on PET/CT dated 7/14/2021. The FDG avid nature, the difference in location from prior PET/CT, and increase in size since CT dated April 18, 2022 is highly suspicious for progression of hypermetabolic malignancy with differential diagnosis including inflammatory consolidation. Progression of malignancy is the diagnosis of exclusion and further evaluation with tissue sampling is recommended as clinically indicated. 2. New mild FDG activity associated with soft tissue involving the left anterolateral chest wall of uncertain clinical significance. This finding appears adjacent and superficial to an irregular fracture of the left third rib and findings could reflect pathologic fracture or reactive inflammatory FDG activity. . 3. Improving mildly FDG avid left upper lobe consolidation most consistent with posttreatment changes, see discussion above. 4. Scattered non-FDG avid bilateral lung nodules which were better characterized on recent dedicated CT of chest dated 6/1/2023. Continued short interval follow-up is recommended.  Please see above for details and additional findings. The study was marked in EPIC for significant notification. Workstation performed: [id number]       Labs:   Lab Results   Component Value Date    WBC 10.12 07/10/2023    HGB 11.2 (L) 07/10/2023    HCT 37.6 07/10/2023    MCV 85 07/10/2023     07/10/2023     Lab Results   Component Value Date     11/23/2015    K 4.6 07/10/2023     (H) 07/10/2023    CO2 25 07/10/2023    ANIONGAP 9 11/23/2015    BUN 18 07/10/2023    CREATININE 1.31 (H) 07/10/2023    GLUCOSE 98 01/03/2022    GLUF 211 (H) 06/01/2023    CALCIUM 9.5 07/10/2023    CORRECTEDCA 10.1 07/10/2023    AST 12 07/10/2023    ALT 12 07/10/2023    ALKPHOS 101 07/10/2023    PROT 6.8 11/23/2015    BILITOT 0.65 11/23/2015    EGFR 39 07/10/2023       Lab Results   Component Value Date    IRON 79 02/16/2022    TIBC 320 02/16/2022    FERRITIN 49 02/16/2022       No results found for: "Donne Niles"      ROS: Review of Systems   Constitutional: Negative for appetite change, chills, diaphoresis, fatigue and unexpected weight change. HENT:   Negative for mouth sores, nosebleeds, sore throat, trouble swallowing and voice change. Eyes: Negative for eye problems and icterus. Respiratory: Negative for chest tightness, cough, hemoptysis and wheezing. Cardiovascular: Negative for chest pain, leg swelling and palpitations. Gastrointestinal: Positive for diarrhea. Negative for abdominal distention, abdominal pain, blood in stool, constipation, nausea and vomiting. Endocrine: Negative for hot flashes. Genitourinary: Negative for bladder incontinence, difficulty urinating, dyspareunia, dysuria and frequency. Musculoskeletal: Negative for arthralgias, back pain, gait problem, neck pain and neck stiffness. Skin: Negative for itching and rash. Neurological: Negative for dizziness, gait problem, headaches, numbness, seizures and speech difficulty. Hematological: Negative for adenopathy. Does not bruise/bleed easily.    Psychiatric/Behavioral: Negative for decreased concentration, depression, sleep disturbance and suicidal ideas. The patient is not nervous/anxious. Current Medications: Reviewed  Allergies: Reviewed  PMH/FH/SH:  Reviewed      Physical Exam:    Body surface area is 1.81 meters squared. Wt Readings from Last 3 Encounters:   08/08/23 80.2 kg (176 lb 12.8 oz)   06/08/23 83.9 kg (185 lb)   05/16/23 83.7 kg (184 lb 9.6 oz)        Temp Readings from Last 3 Encounters:   08/08/23 (!) 96.1 °F (35.6 °C)   06/08/23 97.5 °F (36.4 °C) (Temporal)   05/16/23 (!) 96 °F (35.6 °C) (Tympanic)        BP Readings from Last 3 Encounters:   08/08/23 136/80   06/08/23 140/78   05/16/23 120/60         Pulse Readings from Last 3 Encounters:   08/08/23 78   06/08/23 66   05/16/23 69        Physical Exam  Vitals reviewed. Constitutional:       General: She is not in acute distress. Appearance: She is well-developed. She is not diaphoretic. HENT:      Head: Normocephalic and atraumatic. Eyes:      Conjunctiva/sclera: Conjunctivae normal.   Neck:      Trachea: No tracheal deviation. Cardiovascular:      Rate and Rhythm: Normal rate and regular rhythm. Heart sounds: No murmur heard. No friction rub. No gallop. Pulmonary:      Effort: Pulmonary effort is normal. No respiratory distress. Breath sounds: Rhonchi present. No wheezing or rales. Chest:      Chest wall: No tenderness. Abdominal:      General: There is no distension. Palpations: Abdomen is soft. Tenderness: There is no abdominal tenderness. Musculoskeletal:         General: Tenderness present. Cervical back: Normal range of motion and neck supple. Right lower leg: No edema. Left lower leg: No edema. Lymphadenopathy:      Cervical: No cervical adenopathy. Skin:     General: Skin is warm and dry. Coloration: Skin is not pale. Findings: No erythema.    Neurological:      Mental Status: She is alert and oriented to person, place, and time. Psychiatric:         Behavior: Behavior normal.         Thought Content: Thought content normal.         Judgment: Judgment normal.         ECO    Goals and Barriers:  Current Goal: Minimize effects of disease. Barriers: None. Patient's Capacity to Self Care:  Patient is able to self care.     Code Status: [unfilled]

## 2023-08-08 NOTE — TELEPHONE ENCOUNTER
Voicemail received:    My name's Viviana Schilder and I'm calling from Doctor Kylie Brar's office. Doctor Sue Lucio is a general dentist. We have a mutual patient. Her name is Nena Griffith's birth date [birth year]. If you could please give our office a call. We had a couple of questions concerning some treatment for her. Our number here at the office is [phone]. Thank you.  Romero

## 2023-08-08 NOTE — TELEPHONE ENCOUNTER
Spoke with Triny Clement at dentist office. Pt needs to be premedicated with Clindamycin 600 mg prior to dental procedures. They want to check to make sure it is ok to give with pts recent c-diff infection. Also checking to see if dental cleaning is ok while taking Sotorasib    Advised ok to have dental cleaning while taking Sotorasib. As per Dr. Lakia Puente, Clindamycin 600 mg is ok to take.

## 2023-08-14 ENCOUNTER — OFFICE VISIT (OUTPATIENT)
Dept: PALLIATIVE MEDICINE | Facility: CLINIC | Age: 73
End: 2023-08-14
Payer: MEDICARE

## 2023-08-14 VITALS
HEART RATE: 77 BPM | TEMPERATURE: 96.3 F | BODY MASS INDEX: 32.2 KG/M2 | SYSTOLIC BLOOD PRESSURE: 128 MMHG | WEIGHT: 175 LBS | OXYGEN SATURATION: 98 % | HEIGHT: 62 IN | DIASTOLIC BLOOD PRESSURE: 70 MMHG

## 2023-08-14 DIAGNOSIS — C79.52 SECONDARY MALIGNANT NEOPLASM OF BONE AND BONE MARROW (HCC): ICD-10-CM

## 2023-08-14 DIAGNOSIS — Z86.73 HISTORY OF STROKE: ICD-10-CM

## 2023-08-14 DIAGNOSIS — Z51.5 PALLIATIVE CARE PATIENT: ICD-10-CM

## 2023-08-14 DIAGNOSIS — K63.9 BOWEL TROUBLE: ICD-10-CM

## 2023-08-14 DIAGNOSIS — G62.0 NEUROPATHY DUE TO CHEMOTHERAPEUTIC DRUG (HCC): ICD-10-CM

## 2023-08-14 DIAGNOSIS — K21.9 GASTROESOPHAGEAL REFLUX DISEASE WITHOUT ESOPHAGITIS: ICD-10-CM

## 2023-08-14 DIAGNOSIS — F32.5 MAJOR DEPRESSIVE DISORDER WITH SINGLE EPISODE, IN FULL REMISSION (HCC): ICD-10-CM

## 2023-08-14 DIAGNOSIS — M25.50 JOINT PAIN: ICD-10-CM

## 2023-08-14 DIAGNOSIS — T45.1X5A NEUROPATHY DUE TO CHEMOTHERAPEUTIC DRUG (HCC): ICD-10-CM

## 2023-08-14 DIAGNOSIS — C79.51 SECONDARY MALIGNANT NEOPLASM OF BONE AND BONE MARROW (HCC): ICD-10-CM

## 2023-08-14 DIAGNOSIS — I50.32 CHRONIC DIASTOLIC HEART FAILURE (HCC): ICD-10-CM

## 2023-08-14 DIAGNOSIS — F41.9 ANXIETY: ICD-10-CM

## 2023-08-14 DIAGNOSIS — C34.92 ADENOCARCINOMA OF LEFT LUNG, STAGE 4 (HCC): Primary | ICD-10-CM

## 2023-08-14 DIAGNOSIS — G89.3 CANCER RELATED PAIN: Chronic | ICD-10-CM

## 2023-08-14 PROCEDURE — 99214 OFFICE O/P EST MOD 30 MIN: CPT | Performed by: INTERNAL MEDICINE

## 2023-08-14 RX ORDER — ACETAMINOPHEN 500 MG
500-1000 TABLET ORAL 3 TIMES DAILY PRN
Qty: 540 TABLET | Refills: 3
Start: 2023-08-14

## 2023-08-14 RX ORDER — BUSPIRONE HYDROCHLORIDE 7.5 MG/1
7.5 TABLET ORAL
Qty: 90 TABLET | Refills: 2 | Status: SHIPPED | OUTPATIENT
Start: 2023-08-14

## 2023-08-14 NOTE — PATIENT INSTRUCTIONS
It was good to see you today. Thank you for coming in. If diarrhea is an issue:  Try Banatrol (banana flakes). Use as directed / as-needed for diarrhea. This is something you may safely take every day. If you become constipated you may wish to stop using it, although it can treat both constipation and diarrhea in some patients. This available over-the-counter at some pharmacies, but you may have more success looking online ([employer]). Stay hydrated! If needed, it is okay to take Imodium for diarrhea. Use as directed, available over-the-counter. If constipation is an issue:  Drink PLENTY of water. This is important to keep the gut moving. Some people have success w/ using prunes, prune juice, certain fruits or vegetables (apples, bananas, prunes, pears, raspberries, and vegetables like string beans, broccoli, spinach, kale, squash, lentils, peas, and beans), or fiber gummies. Try a probiotic. This could be yogurt or kefir, or fermented beverages such as kombucha, but probiotics are also available in capsule form. Aim for 10-15 billion colony-forming-units, w/ bacteria such as Lactobacillus / Saccharomyces / Actinomyces. Osmotic laxatives (Miralax, magnesium citrate, Milk of Magnesia) can be very useful for opioid-induced constipation (OIC); take daily to prevent OIC. Bulk laxatives (Citrucel, Metamucil, Fibercon, Benefiber, wheat germ) are useful for constipation in patients who are not taking opioids, but are not recommended if you are taking opioids. Colace is good for softening hard stools, or preventing constipation when opioids are being used - but does not stimulate the bowel to move things along once constipation has occurred. You can use senna, 1 to 2 tabs, once or twice daily as needed for constipation. Use as directed on the box/bottle. Senna is also available in a tea ("Smooth Move"). Should that not be enough for your constipation, you can try Dulcolax.   Should that not be enough, consider an enema. All of these medications are available over-the-counter. Return in about 3 months (around 11/14/2023). Call us for refills on medications that we supply, as needed. If something changes and you need to come in sooner, please call our office. PRESCRIPTION REFILL REMINDER:  All medication refills should be requested prior to [facility] on Friday. Any refill requests after noon on Friday would be addressed the following Monday. MEDICATION SAFETY ISSUES:  Do not drive under the influence of narcotics (including opioids), watch for adverse effects including confusion / altered mental status / respiratory depression (slowed breathing), keep medications stored in a safe/locked environment, do not use alcohol while opioids or other narcotics are in your system.

## 2023-08-14 NOTE — PROGRESS NOTES
Follow-up with Palliative and Supportive Care  Abhinav Griffith 74 y.o. female [id number]    ASSESSMENT & PLAN:  1. Adenocarcinoma of left lung, stage 4 ([address])    2. Chronic diastolic heart failure ([address])    3. History of stroke    4. 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)    5. Major depressive disorder with single episode, in full remission ([address])    6. Anxiety    7. Gastroesophageal reflux disease without esophagitis    8. Cancer related pain    9. 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[address])    10. Palliative care patient    6. Adenocarcinoma of lung, stage 4, unspecified laterality ([address])    12. Joint pain    13. Bowel trouble          • Patient reports her chronic mood issues are well-managed on her current regimen. o Continue buspirone 7.5mg QHS. Of note, sotorazib lowers serum concentrations of buspirone. o Continue venlafaxine at 75mg QHS. o Patient has declined offer of psychotherapy. • Continue omeprazole for reflux. Effective. • Patient reports polyarthralgia is improving w/o need for opioids. Some of her chronic pain may be cancer-related. o Continue Tylenol PRN. Effective. No interactions between sotorasib and Tylenol. o Patient may use oxyIR for chronic pain; she has not needed this in months, and did not  the eRx sent 5/8/23.  o Continue gabapentin at 200mg q12h. Effective. Tolerated w/o issue. • Counseled on bowel regimen for intermittent constipation and diarrhea. OTC products have been effective. • Continue disease-directed cares. She plans to meet w/ Radiation Oncology for recent PET CT findings. • ACP: Patient has completed advanced directives (POLST and Power of ROSARIO GONZALEZ). In EMR. Advanced directive counseling given. • Reviewed notes (Medical Oncology), labs (7/10/23 Cr 1.31, eGFR 39, alb 3.2, Hb 11.2; Cdiff positive on 7/15/23), imaging (7/28/23 PET CT). • Return in about 3 months (around 11/14/2023). • Emotional support provided.   • Medication safety issues addressed - no driving under the influence of narcotics (including opioids), watch for adverse effects including AMS or respiratory depression (slowed breathing), keep medications stored in a safe/locked environment, do not use alcohol while opioids or other narcotics are in one's system. Requested Prescriptions     Signed Prescriptions Disp Refills   • acetaminophen (TYLENOL) 500 mg tablet 540 tablet 3     Sig: Take 1-2 tablets (500-1,000 mg total) by mouth 3 (three) times a day as needed for mild pain, headaches or fever   • busPIRone (BUSPAR) 7.5 mg tablet 90 tablet 2     Sig: Take 1 tablet (7.5 mg total) by mouth daily at bedtime       Medications Discontinued During This Encounter   Medication Reason   • acetaminophen (TYLENOL) 500 mg tablet    • busPIRone (BUSPAR) 7.5 mg tablet Reorder       Representatives have queried the patient's controlled substance dispensing history in the Prescription Drug Monitoring Program in compliance with regulations before I have prescribed any controlled substances. The prescription history is consistent with prescribed therapy and our practice policies. 30 minutes were spent in this ambulatory visit with greater than 50% of the time spent face to face with patient in counseling or coordination of care including symptom assessment and management, medication review, psychosocial support, chart review, imaging review, lab review, supportive listening and anticipatory guidance. All of the patient's questions were answered during this discussion. SUBJECTIVE:  Chief Complaint   Patient presents with   • Follow-up   • Cancer   • Anxiety   • Depression   • GERD   • Counseling   • Joint Pain        HPI    Avila Colon is a 74 y.o. female w/ stage IV non-small cell lung cancer (diagnosed 2016) w/ osseous metastasis s/p chemotherapy + immunotherapy + RT, DM2, HFpEF, Afib, CKD, HTN+HLD, h/o CVA.  She follows w/ Eva Ferreira PA-C + Dr Timi Singh (Medical Oncology), [address] [place] [address] Specialists, Dr Nikunj Crowley (Cardiology), Dr Akua Amanda (Neurology). Plan includes systemic therapy w/ sotorasib. Patient is known to [facility] clinic; seen 5/8/23 for symptom assessment and management, medication review, medication adjustment, psychosocial support, chart review, imaging review, lab review, advanced directives, supportive listening and anticipatory guidance. Though patient has had some discomfort which may be related to change in her malignancy / progression, and plans to discuss options w/ Radiation Oncology soon, she states that overall her chronic polyarthralgia and other potential cancer-related pains has improved. She has not needed oxyIR in months. She is using gabapentin to good effect, and tolerating it; she is also using Tylenol PRN. Patient reports her mood is stable on her current regimen and her sleep is "good". She has some intermittent constipation and diarrhea, noting that OTC products are enough to control these bowel issues. GERD is managed. PDMP shows no concerns. The following portions of the medical history were reviewed: past medical history, surgical history, problem list, medication list, family history, and social history.       Current Outpatient Medications:   •  acetaminophen (TYLENOL) 500 mg tablet, Take 1-2 tablets (500-1,000 mg total) by mouth 3 (three) times a day as needed for mild pain, headaches or fever, Disp: 540 tablet, Rfl: 3  •  apixaban (Eliquis) 5 mg, TAKE 1 TABLET TWICE A DAY, Disp: 180 tablet, Rfl: 3  •  aspirin (ECOTRIN LOW STRENGTH) 81 mg EC tablet, Take 1 tablet (81 mg total) by mouth daily, Disp: 30 tablet, Rfl: 0  •  atorvastatin (LIPITOR) 40 mg tablet, TAKE 1 TABLET BY MOUTH EVERY DAY, Disp: 90 tablet, Rfl: 1  •  busPIRone (BUSPAR) 7.5 mg tablet, Take 1 tablet (7.5 mg total) by mouth daily at bedtime, Disp: 90 tablet, Rfl: 2  •  cyanocobalamin (VITAMIN B-12) 100 mcg tablet, Take by mouth daily, Disp: , Rfl:   •  folic acid (FOLVITE) 1 mg tablet, Take 1 tablet (1 mg total) by mouth daily, Disp: 90 tablet, Rfl: 1  •  gabapentin (Neurontin) 100 mg capsule, Take 2 capsules (200 mg total) by mouth 2 (two) times a day, Disp: 360 capsule, Rfl: 0  •  linaGLIPtin (Tradjenta) 5 MG TABS, Take 5 mg by mouth daily, Disp: 90 tablet, Rfl: 3  •  loperamide (IMODIUM) 2 mg capsule, Take 2 mg by mouth 4 (four) times a day as needed for diarrhea, Disp: , Rfl:   •  loratadine (CLARITIN) 10 mg tablet, Take 10 mg by mouth as needed, Disp: , Rfl:   •  LOSARTAN POTASSIUM PO, Take by mouth, Disp: , Rfl:   •  metFORMIN (GLUCOPHAGE) 500 mg tablet, TAKE 1 TABLET TWICE DAILY  WITH MEALS, Disp: 180 tablet, Rfl: 3  •  metoprolol tartrate (LOPRESSOR) 25 mg tablet, Take 1 tablet (25 mg total) by mouth every 12 (twelve) hours, Disp: 180 tablet, Rfl: 3  •  omeprazole (PriLOSEC) 20 mg delayed release capsule, Take 1 capsule (20 mg total) by mouth daily, Disp: 90 capsule, Rfl: 3  •  sotalol (BETAPACE) 80 mg tablet, Take 1 tablet (80 mg total) by mouth 2 (two) times a day, Disp: 180 tablet, Rfl: 3  •  Sotorasib 120 MG TABS, Take 960 mg by mouth daily, Disp: 240 tablet, Rfl: 0  •  valsartan (DIOVAN) 160 mg tablet, TAKE 1 TABLET BY MOUTH TWICE A DAY, Disp: 180 tablet, Rfl: 3  •  venlafaxine (EFFEXOR) 37.5 mg tablet, TAKE 2 TABLETS AT BEDTIME, Disp: 180 tablet, Rfl: 0  •  oxyCODONE (OXY-IR) 5 MG capsule, Take 5 mg by mouth every 4 (four) hours as needed for moderate pain or severe pain (Patient not taking: Reported on 8/8/2023), Disp: , Rfl:   •  potassium chloride (Klor-Con M10) 10 mEq tablet, Take 1 tablet (10 mEq total) by mouth daily (Patient not taking: Reported on 8/8/2023), Disp: 90 tablet, Rfl: 3    Review of Systems   Constitutional: Positive for appetite change and unexpected weight change. Gastrointestinal: Positive for constipation (intermittent) and diarrhea (intermittent). Reflux (managed / at goal). Musculoskeletal: Positive for arthralgias (improving). Allergic/Immunologic: Positive for immunocompromised state. Psychiatric/Behavioral: Positive for dysphoric mood (stable). The patient is nervous/anxious (stable). All other systems reviewed and are negative. OBJECTIVE:  /70 (BP Location: Left arm, Patient Position: Sitting, Cuff Size: Standard)   Pulse 77   Temp (!) 96.3 °F (35.7 °C) (Temporal)   Ht 5' 2" (1.575 m)   Wt 79.4 kg (175 lb)   LMP  (LMP Unknown)   SpO2 98%   BMI 32.01 kg/m²   Physical Exam  Vitals reviewed. Constitutional:       General: She is not in acute distress. Appearance: She is overweight. She is not toxic-appearing. HENT:      Head: Normocephalic and atraumatic. Right Ear: External ear normal.      Left Ear: External ear normal.   Eyes:      General: No scleral icterus. Right eye: No discharge. Left eye: No discharge. Extraocular Movements: Extraocular movements intact. Conjunctiva/sclera: Conjunctivae normal.      Pupils: Pupils are equal, round, and reactive to light. Cardiovascular:      Rate and Rhythm: Normal rate. Pulmonary:      Effort: Pulmonary effort is normal. No tachypnea, bradypnea, accessory muscle usage or respiratory distress. Comments: Able to speak comfortably in complete sentences on room air at rest.  Abdominal:      General: There is no distension. Tenderness: There is no guarding. Musculoskeletal:      Cervical back: Normal range of motion. Right lower leg: No edema. Left lower leg: No edema. Skin:     General: Skin is dry. Coloration: Skin is not pale. Neurological:      Mental Status: She is alert and oriented to person, place, and time. Cranial Nerves: No dysarthria or facial asymmetry. Gait: Gait normal.      Comments: Using no assistive devices for ambulation.    Psychiatric:         Attention and Perception: Attention normal.         Mood and Affect: Mood and affect normal.         Speech: Speech normal. Behavior: Behavior normal. Behavior is cooperative. Thought Content: Thought content normal.         Cognition and Memory: Cognition and memory normal.         Judgment: Judgment normal.          Claudette Riches, MD  [facility]  [phone]    Portions of this document may have been created using dictation software and as such some "sound alike" terms may have been generated by the system. Do not hesitate to contact me with any questions or clarifications.

## 2023-08-17 ENCOUNTER — RADIATION ONCOLOGY FOLLOW-UP (OUTPATIENT)
Dept: RADIATION ONCOLOGY | Facility: HOSPITAL | Age: 73
End: 2023-08-17
Attending: INTERNAL MEDICINE
Payer: MEDICARE

## 2023-08-17 ENCOUNTER — CLINICAL SUPPORT (OUTPATIENT)
Dept: RADIATION ONCOLOGY | Facility: HOSPITAL | Age: 73
End: 2023-08-17
Attending: STUDENT IN AN ORGANIZED HEALTH CARE EDUCATION/TRAINING PROGRAM
Payer: MEDICARE

## 2023-08-17 VITALS
SYSTOLIC BLOOD PRESSURE: 124 MMHG | HEART RATE: 75 BPM | DIASTOLIC BLOOD PRESSURE: 82 MMHG | HEIGHT: 62 IN | OXYGEN SATURATION: 98 % | TEMPERATURE: 97.8 F | BODY MASS INDEX: 32.02 KG/M2 | WEIGHT: 174 LBS | RESPIRATION RATE: 18 BRPM

## 2023-08-17 DIAGNOSIS — C34.92 ADENOCARCINOMA OF LEFT LUNG, STAGE 4 (HCC): Primary | ICD-10-CM

## 2023-08-17 DIAGNOSIS — C34.92 ADENOCARCINOMA OF LEFT LUNG, STAGE 4 (HCC): ICD-10-CM

## 2023-08-17 PROCEDURE — 99211 OFF/OP EST MAY X REQ PHY/QHP: CPT | Performed by: STUDENT IN AN ORGANIZED HEALTH CARE EDUCATION/TRAINING PROGRAM

## 2023-08-17 NOTE — PROGRESS NOTES
Jose Schwartz 1950 is a 74 y.o. female with a history of stage IV non-small cell lung cancer diagnosed initially in 2016, with a rather indolent disease course, currently with minimal systemic disease burden and on break from systemic therapy. She was referred to our department for consideration of lung stereotactic radiation. Imaging revealed a dominant left upper lobe lesion which had been very slowly increasing in size over the past few years with an SUV of 4.7, currently measuring 2.7 cm. This lesion had been present ever since her initial diagnosis. Adjacent and lateral to that lesion, there was a 2nd lesion ground-glass in appearance, also slowly enlarging over the past few years with minimal hypermetabolic activity likely representing a distinct synchronous low-grade neoplasm. This lesion measured 3 mm in 2018 and currently measures 1.3 cm. She has now undergone definitive stereotactic radiation to both lesions, with treatment delivered concurrently via a single isocenter plan, completing 8 fractions to left lung on 4/21/21. The pt was last seen in radiation on 09/21/21.  She returns today for follow up.      10/01/21 - Hem Onc, [place]  CT from June 2021 shows right lower lobe nodule increasing in size   Pet from July 2021 shows enlarging right lower lobe mass  Initiated Nivolumab 240 mg flat dose every 3 weeks, pemetrexed 250 milligram/meter squared, carboplatin AUC 5 every 3 weeks on 08/02/21 11/08/21 - Hem Onc [facility]  Started Taxotere Cyramza on 10/08/21 - recv'd 2 cycles with Neulasta growth factor   Will do CT after 3 cycles     12/13/21 - Hem Onc, [place]  CT from 11/29 shows stable disease  Tolerating treatment well - has some fatigue     01/03/22 - 01/11/22 - Admitted to hospital  Multiple falls  Pt had headaches; urinary retention (bahena cath placed)   Pt stable at time of discharge      01/03/22 - CT chest abdomen pelvis w contrast  IMPRESSION:  Suspected small post traumatic hematoma measuring 2 cm in the soft tissues of the right buttock/perianal region. No active bleeding. No visceral or osseous injury identified. Scattered groundglass opacities in both lungs which are new from prior exam.  Consider infectious or inflammatory etiologies including Covid viral pneumonia. Previous right lower lobe lung mass has diminished since July. Previous left upper lobe tumor has also decreased in size but appears more atelectatic. Increasing water attenuation pleural fluid, partially loculated pleural fluid in the left lower lung. Follow-up per cancer imaging protocol. I personally discussed this study with Temo Lynn on 1/3/2022 at 8:43 PM.  Stable pancreatic cystic lesions follow-up per protocol. Small hiatal hernia with gastroesophageal reflux. Colonic diverticulosis. Mild bladder wall thickening which may be Due to incomplete distention. 02/14/22 - Hem Onc, [facility]  Right basal ganglia & right caudate ischemic - CVA 1/21  Pt agreed to proceed with treatment - Navelbine 25 milligram every other week  Follow up with CT in 2 months        03/01/22 - CT chest abdomen pelvis w contrast  IMPRESSION:  Left suprahilar opacity again identified likely treatment related with underlying residual tumor not excluded. Opacities seen on the prior exam more peripheral have slightly improved and are likely infectious or inflammatory in etiology. 7 mm lingular nodule which which is not identified on study of 6/9/2021 and developing metastasis is not excluded. Attention on follow-up recommended. Persistent linear bandlike opacity within the medial right lower lobe. This can also be reassessed at follow-up. 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    04/18/22 - CT chest w contrast  IMPRESSION:  Nothing to indicate recurrent tumor. Multiple cystic pancreatic lesions. See MRI from today. 04/18/22 - MRI abdomen w wo contrast and mrcp  IMPRESSION:  1.   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 The largest is a thinly septated cystic lesion in the pancreatic body measuring 3.2 cm similar to prior. No development of solid   components or main pancreatic ductal dilation. For simple cyst(s) 2 cm or greater recommend gastroenterology and/or surgical oncology consult and consideration for EUS. Recommendations are based on recent consensus statements on management of pancreatic cystic lesions from [address] [facility], [facility],  of Radiology, the Journal of Pancreatology, and our own institutional consensus. 2.  Enhancing nodular focus in the medial basilar right lower lobe measuring approximately 1.7 x 1.0 cm with diffusion restriction in the area of previously seen mass on PET/CT 7/14/2021. Residual tumor not excluded. Please refer to report for same-day   chest CT.    04/25/22 - Hem Onc [facility]  CT from 04/2022 shows stable disease  Continue with treatment      08/30/22 - CT chest wo contrast  IMPRESSION:  Enlarged medial right lower lobe spiculated opacity now measuring 3.8 x 1.9 cm previously measured 2.1 x 1.2 suspicious for malignancy. Unchanged left upper lobe radiation fibrosis. The study was marked in [facility] for immediate notification. 10/13/22 - Hem Onc [facility]  Most recent CT shows progression of disease  Give KRAS G12C mutation - recommend change treatment to Lumakrans  Pt would like to wait and repeat CT in November 11/14/22 - CT chest wo contrast  IMPRESSION:  Continued enlargement of right lower lobe paraspinal nodule since August 2022 compatible with tumor. Healing fracture of the anterolateral left 3rd rib, visible only in retrospect in August 2022, likely pathologic. Stable 2.8 cm cystic lesion in the distal body of the pancreas, evaluated by MRI in April 2022. See recommendations from that report.    I notified Dr. Orion Beckwith by Epic message on 11/18/2022 at 12:10 PM. This study was marked for significant notification. 11/17/22 - Hem Onc, [facility]  Most recent CT stable  Pt continue on 960 mg Lumakrans without food in the am    02/16/23 - CT chest wo contrast  IMPRESSION:  Very slight continued enlargement of right lower lobe paraspinal mass since previous examination. Otherwise no significant interval change since previous examination. 03/03/23 - Hem Onc, [facility]  CT from 02/2023 shows minimal enlargement, no new lesions  Possible fracture of anterior fourth rib - left side    06/01/23 - CT chest abdomen pelvis wo contrast  IMPRESSION:  Evaluation limited by suboptimal inspiration, changing configuration of the right lower lobe mass. Judging by calculated volume, there has been mild decrease in size (16 mL from 20 mL). The mass now abuts the posterior IVC but this may be technical. If   clinically warranted, consider PET/CT as a more sensitive modality for viable tumor. New 6 x 3 mm right upper lobe subpleural nodule, possibly minimal scarring. Consider follow-up in 3 months. Significant decrease in size and conspicuity of the pancreatic cyst, now 1.8 cm from previously 2.8 cm. Correlate for interval intervention. If none, this may reflect spontaneous resolution of a pseudocyst.  Otherwise no significant interval changes. Chronic findings, as above. 06/08/23 - Hem Onc, [facility]  CT from June shows significant decrease   Pt to continue treatment    07/28/23 - NM PET CT skull base to mid thigh  IMPRESSION:  1. Hypermetabolic masslike consolidation involving the medial right lower lobe towards the right lung base/region of the diaphragm which has increased in size since April 18, 2022 and is more medial to the location of previously noted hypermetabolic   right lower lobe malignancy seen on PET/CT dated 7/14/2021.  The FDG avid nature, the difference in location from prior PET/CT, and increase in size since CT dated April 18, 2022 is highly suspicious for progression of hypermetabolic malignancy with   differential diagnosis including inflammatory consolidation. Progression of malignancy is the diagnosis of exclusion and further evaluation with tissue sampling is recommended as clinically indicated. 2. New mild FDG activity associated with soft tissue involving the left anterolateral chest wall of uncertain clinical significance. This finding appears adjacent and superficial to an irregular fracture of the left third rib and findings could reflect   pathologic fracture or reactive inflammatory FDG activity. .  3. Improving mildly FDG avid left upper lobe consolidation most consistent with posttreatment changes, see discussion above. 4. Scattered non-FDG avid bilateral lung nodules which were better characterized on recent dedicated CT of chest dated 2023. Continued short interval follow-up is recommended. Please see above for details and additional findings. The study was marked in EPIC for significant notification. 23 - Hem [facility]  PET from 2023 shows uptake in right paraspinal mass   Otherwise, disease is stable  Pt c/o pain in right thoracic spine - referred to Rad onc for palliative RT        Upcomin23 - Hem Onc, [place]  10/09/23 - Hem Onc, [facility]  23 [facility]        Oncology History   Adenocarcinoma of lung, stage 4 ([address])   10/18/2016 Initial Diagnosis    Adenocarcinoma of lung, stage 4 ([address])     10/18/2016 Biopsy    Final Diagnosis  A. Bone, T10, biopsy:     - Non-small cell carcinoma with features suggesting adenocarcinoma; see note.          2016 - 2016 Radiation    Course 1: T9 - T11 SPINE  13 fractions   Total Dose = 3,250 cGy     2016 Biopsy    Final Diagnosis  Lymph Node, Level 7: Conclusive evidence of Malignancy  Poorly differentiated non-small cell carcinoma      Lung, right main stem bronchus, biopsy:              - Poorly differentiated adenocarcinoma       12/15/2016 - 6/1/2017 Chemotherapy    Pembrolizumab 200 mg IV flat dose every 3 weeks      9/13/2017 Biopsy    Bone, left scapula, biopsy:  -  Positive for malignancy, consistent with metastatic adenocarcinoma          10/3/2017 - 10/16/2017 Radiation    palliative course of radiation therapy to the left scapular lesion to 3000 cGy( metastatic from adenocarcinoma of the lung)          4/10/2018 - 10/9/2018 Chemotherapy    nivolumab with prednisone 10 mg p.o     11/6/2018 -  Chemotherapy    11/6/18   Alimta 500 milligram/meter squared, carboplatin AUC 5 every 3 weeks,     11/6/2018 - 3/1/2021 Chemotherapy    cyanocobalamin injection 1,000 mcg, 1,000 mcg, Intramuscular, Once, 6 of 10 cycles  Administration: 1,000 mcg (1/17/2020), 1,000 mcg (6/10/2020), 1,000 mcg (10/14/2020), 1,000 mcg (1/5/2021)  fosaprepitant (EMEND) 150 mg in sodium chloride 0.9 % 250 mL IVPB, 150 mg, Intravenous, Once, 1 of 6 cycles  Administration: 150 mg (2/2/2021)  PEMEtrexed (ALIMTA) 1,020 mg in sodium chloride 0.9 % 100 mL chemo infusion, 500 mg/m2 = 1,020 mg, Intravenous, Once, 32 of 37 cycles  Dose modification: 400 mg/m2 (original dose 500 mg/m2, Cycle 23, Reason: Other (See Comments), Comment: decreasing crcl), 300 mg/m2 (original dose 500 mg/m2, Cycle 26, Reason: Treatment Parameters Not Met), 300 mg/m2 (original dose 500 mg/m2, Cycle 30, Reason: Max Dose Reached)  Administration: 1,020 mg (5/24/2019), 1,000 mg (6/14/2019), 1,000 mg (7/5/2019), 1,000 mg (8/2/2019), 1,000 mg (8/23/2019), 1,000 mg (9/13/2019), 1,000 mg (10/4/2019), 1,000 mg (10/25/2019), 1,000 mg (11/15/2019), 1,000 mg (12/6/2019), 1,000 mg (12/27/2019), 1,000 mg (1/17/2020), 1,000 mg (2/7/2020), 800 mg (5/22/2020), 800 mg (6/10/2020), 800 mg (7/1/2020), 600 mg (7/29/2020), 800 mg (8/19/2020), 600 mg (11/11/2020), 600 mg (1/5/2021), 600 mg (2/2/2021), 600 mg (10/14/2020)     11/12/2018 - 11/26/2018 Radiation    Course: C3: Left Scapula retreat  10 fractions  2,000 cGy         4/5/2021 - 4/21/2021 Radiation    Course: C4 SBRT    Plan ID Energy Fractions Dose per Fraction (cGy) Dose Correction (cGy) Total Dose Delivered (cGy) Elapsed Days   SBRT LtLung 6X 8 / 8 750 0 6,000 16      Dr. Colleen Moore     8/2/2021 - 9/24/2021 Chemotherapy    cyanocobalamin, 1,000 mcg, Intramuscular, Once, 2 of 3 cycles  Administration: 1,000 mcg (7/19/2021)  fosaprepitant (EMEND) IVPB, 150 mg, Intravenous, Once, 3 of 6 cycles  Administration: 150 mg (8/2/2021), 150 mg (9/24/2021), 150 mg (8/23/2021)  nivolumab (OPDIVO) IVPB, 240 mg (100 % of original dose 240 mg), Intravenous, Once, 3 of 6 cycles  Dose modification: 240 mg (original dose 240 mg, Cycle 1)  Administration: 240 mg (8/2/2021), 240 mg (8/23/2021), 240 mg (9/24/2021)  CARBOplatin (PARAPLATIN) IVPB (GO AUC DOSING), 275 mg, Intravenous, Once, 3 of 6 cycles  Dose modification: 304.5 mg (original dose 279.5 mg, Cycle 3, Reason: Other (Must fill in a comment), Comment:  to sign order),   (original dose 279.5 mg, Cycle 3, Reason: Treatment Parameters Not Met)  Administration: 275 mg (8/2/2021), 273.5 mg (9/24/2021), 304.5 mg (8/23/2021)  pemetrexed (ALIMTA) chemo infusion, 490 mg (50 % of original dose 500 mg/m2), Intravenous, Once, 3 of 6 cycles  Dose modification: 250 mg/m2 (original dose 500 mg/m2, Cycle 1, Reason: Anticipated Tolerance)  Administration: 500 mg (8/2/2021), 490 mg (8/23/2021)     10/15/2021 - 12/17/2021 Chemotherapy    pegfilgrastim (NEULASTA ONPRO), 6 mg, Subcutaneous, Once, 4 of 8 cycles  Administration: 6 mg (10/15/2021), 6 mg (11/5/2021), 6 mg (11/26/2021), 6 mg (12/17/2021)  fosaprepitant (EMEND) IVPB, 150 mg, Intravenous, Once, 4 of 8 cycles  Administration: 150 mg (10/15/2021), 150 mg (11/5/2021), 150 mg (11/26/2021), 150 mg (12/17/2021)  ramucirumab (CYRAMZA) IVPB, 10 mg/kg = 866 mg, Intravenous, Once, 3 of 3 cycles  Administration: 866 mg (11/5/2021), 900 mg (11/26/2021), 800 mg (12/17/2021)  DOCEtaxel (TAXOTERE) chemo infusion, 75 mg/m2 = 144 mg, Intravenous, Once, 4 of 8 cycles  Dose modification: 60 mg/m2 (original dose 75 mg/m2, Cycle 5, Reason: Anticipated Tolerance)  Administration: 144 mg (10/15/2021), 144 mg (11/5/2021), 144 mg (11/26/2021), 138 mg (12/17/2021)     2/21/2022 - 9/6/2022 Chemotherapy    vinORELBine (NAVELBINE) IVPB, 25 mg/m2 = 47 mg (83.3 % of original dose 30 mg/m2), Intravenous, Once, 7 of 10 cycles  Dose modification: 25 mg/m2 (original dose 30 mg/m2, Cycle 1, Reason: Dose modified as per discussion with consulting physician)  Administration: 47 mg (2/21/2022), 47 mg (3/7/2022), 47 mg (3/21/2022), 47 mg (4/4/2022), 47 mg (4/18/2022), 47 mg (5/2/2022), 47 mg (5/16/2022), 47 mg (5/31/2022), 47 mg (6/20/2022), 47 mg (7/5/2022), 47 mg (7/25/2022), 47 mg (8/8/2022), 47 mg (8/22/2022), 47 mg (9/6/2022)     6/8/2023 -  Cancer Staged    Staging form: Lung, AJCC 7th Edition  - Clinical: T2 - Signed by Mariann Tyler MD on 6/8/2023  Stage prefix: Initial diagnosis  Laterality: Left  Source of metastatic specimen: Lung, Liver, Bone, Distant Lymph Nodes, Mediastinal Lymph Nodes  Histologic grade (G): G3  Lymph-vascular invasion (LVI): LVI not present (absent)/not identified  Residual tumor (R): RX - Cannot be assessed  Pleural/elastic layer invasion: Not assessed  Stage used in treatment planning: Yes  National guidelines used in treatment planning: Yes  Type of national guideline used in treatment planning: NCCN       6/8/2023 -  Cancer Staged    Staging form: Lung, AJCC 7th Edition  - Pathologic: Stage IV (M1b) - Signed by Mariann Tyler MD on 6/8/2023  Biopsy of metastatic site performed: Yes  Source of metastatic specimen: Bone       Malignant neoplasm of unspecified part of right bronchus or lung ([address]) (Resolved)   5/24/2017 Initial Diagnosis    Malignant neoplasm of unspecified part of right bronchus or lung ([address]) (Resolved)     4/5/2021 - 4/21/2021 Radiation    Course: C4 SBRT    Plan ID Energy Fractions Dose per Fraction (cGy) Dose Correction (cGy) Total Dose Delivered (cGy) Elapsed Days   SBRT LtLung 6X 8 / 8 750 0 6,000 16      Dr. Zandra Jensen     Malignant neoplasm of bone with metastases ([address])   5/24/2017 Initial Diagnosis    Malignant neoplasm of bone with metastases ([address])     4/5/2021 - 4/21/2021 Radiation    Course: C4 SBRT    Plan ID Energy Fractions Dose per Fraction (cGy) Dose Correction (cGy) Total Dose Delivered (cGy) Elapsed Days   SBRT LtLung 6X 8 / 8 750 0 6,000 16      Dr. Zandra Jensen     10/15/2021 - 12/17/2021 Chemotherapy    pegfilgrastim (NEULASTA ONPRO), 6 mg, Subcutaneous, Once, 4 of 8 cycles  Administration: 6 mg (10/15/2021), 6 mg (11/5/2021), 6 mg (11/26/2021), 6 mg (12/17/2021)  fosaprepitant (EMEND) IVPB, 150 mg, Intravenous, Once, 4 of 8 cycles  Administration: 150 mg (10/15/2021), 150 mg (11/5/2021), 150 mg (11/26/2021), 150 mg (12/17/2021)  ramucirumab (CYRAMZA) IVPB, 10 mg/kg = 866 mg, Intravenous, Once, 3 of 3 cycles  Administration: 866 mg (11/5/2021), 900 mg (11/26/2021), 800 mg (12/17/2021)  DOCEtaxel (TAXOTERE) chemo infusion, 75 mg/m2 = 144 mg, Intravenous, Once, 4 of 8 cycles  Dose modification: 60 mg/m2 (original dose 75 mg/m2, Cycle 5, Reason: Anticipated Tolerance)  Administration: 144 mg (10/15/2021), 144 mg (11/5/2021), 144 mg (11/26/2021), 138 mg (12/17/2021)     2/21/2022 - 9/6/2022 Chemotherapy    vinORELBine (NAVELBINE) IVPB, 25 mg/m2 = 47 mg (83.3 % of original dose 30 mg/m2), Intravenous, Once, 7 of 10 cycles  Dose modification: 25 mg/m2 (original dose 30 mg/m2, Cycle 1, Reason: Dose modified as per discussion with consulting physician)  Administration: 47 mg (2/21/2022), 47 mg (3/7/2022), 47 mg (3/21/2022), 47 mg (4/4/2022), 47 mg (4/18/2022), 47 mg (5/2/2022), 47 mg (5/16/2022), 47 mg (5/31/2022), 47 mg (6/20/2022), 47 mg (7/5/2022), 47 mg (7/25/2022), 47 mg (8/8/2022), 47 mg (8/22/2022), 47 mg (9/6/2022)     Lung cancer ([address])   1/3/2022 Initial Diagnosis    Lung cancer ([address])     2/21/2022 - 9/6/2022 Chemotherapy    vinORELBine (NAVELBINE) IVPB, 25 mg/m2 = 47 mg (83.3 % of original dose 30 mg/m2), Intravenous, Once, 7 of 10 cycles  Dose modification: 25 mg/m2 (original dose 30 mg/m2, Cycle 1, Reason: Dose modified as per discussion with consulting physician)  Administration: 47 mg (2/21/2022), 47 mg (3/7/2022), 47 mg (3/21/2022), 47 mg (4/4/2022), 47 mg (4/18/2022), 47 mg (5/2/2022), 47 mg (5/16/2022), 47 mg (5/31/2022), 47 mg (6/20/2022), 47 mg (7/5/2022), 47 mg (7/25/2022), 47 mg (8/8/2022), 47 mg (8/22/2022), 47 mg (9/6/2022)      Chemotherapy    cyanocobalamin, 1,000 mcg, Intramuscular, Once, 6 of 10 cycles    Administration: 1,000 mcg (1/17/2020), 1,000 mcg (6/10/2020), 1,000 mcg (10/14/2020), 1,000 mcg (1/5/2021)        fosaprepitant (EMEND) IVPB, 150 mg, Intravenous, Once, 1 of 6 cycles    Administration: 150 mg (2/2/2021)        pemetrexed (ALIMTA) chemo infusion, 500 mg/m2 = 1,020 mg, Intravenous, Once, 32 of 37 cycles    Dose modification: 400 mg/m2 (original dose 500 mg/m2, Cycle 23, Reason: Other (Must fill in a comment), Comment: decreasing crcl), 300 mg/m2 (original dose 500 mg/m2, Cycle 26, Reason: Treatment Parameters Not Met), 300 mg/m2 (original dose 500 mg/m2, Cycle 30, Reason: Max Dose Reached)    Administration: 1,020 mg (5/24/2019), 1,000 mg (6/14/2019), 1,000 mg (7/5/2019), 1,000 mg (8/2/2019), 1,000 mg (8/23/2019), 1,000 mg (9/13/2019), 1,000 mg (10/4/2019), 1,000 mg (10/25/2019), 1,000 mg (11/15/2019), 1,000 mg (12/6/2019), 1,000 mg (12/27/2019), 1,000 mg (1/17/2020), 1,000 mg (2/7/2020), 800 mg (5/22/2020), 800 mg (6/10/2020), 800 mg (7/1/2020), 600 mg (7/29/2020), 800 mg (8/19/2020), 600 mg (11/11/2020), 600 mg (1/5/2021), 600 mg (2/2/2021), 600 mg (10/14/2020)    cyanocobalamin, 1,000 mcg, Intramuscular, Once, 2 of 3 cycles    Administration: 1,000 mcg (7/19/2021)        fosaprepitant (EMEND) IVPB, 150 mg, Intravenous, Once, 3 of 6 cycles    Administration: 150 mg (8/2/2021), 150 mg (9/24/2021), 150 mg (8/23/2021)        nivolumab (OPDIVO) IVPB, 240 mg (100 % of original dose 240 mg), Intravenous, Once, 3 of 6 cycles    Dose modification: 240 mg (original dose 240 mg, Cycle 1)    Administration: 240 mg (8/2/2021), 240 mg (8/23/2021), 240 mg (9/24/2021)        CARBOplatin (PARAPLATIN) IVPB ([facility] AUC DOSING), 275 mg, Intravenous, Once, 3 of 6 cycles    Dose modification: 304.5 mg (original dose 279.5 mg, Cycle 3, Reason: Other (Must fill in a comment), Comment:  to sign order),   (original dose 279.5 mg, Cycle 3, Reason: Treatment Parameters Not Met)    Administration: 275 mg (8/2/2021), 273.5 mg (9/24/2021), 304.5 mg (8/23/2021)        pemetrexed (ALIMTA) chemo infusion, 490 mg (50 % of original dose 500 mg/m2), Intravenous, Once, 3 of 6 cycles    Dose modification: 250 mg/m2 (original dose 500 mg/m2, Cycle 1, Reason: Anticipated Tolerance)    Administration: 500 mg (8/2/2021), 490 mg (8/23/2021)    pegfilgrastim (NEULASTA ONPRO), 6 mg, Subcutaneous, Once, 4 of 8 cycles    Administration: 6 mg (10/15/2021), 6 mg (11/5/2021), 6 mg (11/26/2021), 6 mg (12/17/2021)        fosaprepitant (EMEND) IVPB, 150 mg, Intravenous, Once, 4 of 8 cycles    Administration: 150 mg (10/15/2021), 150 mg (11/5/2021), 150 mg (11/26/2021), 150 mg (12/17/2021)        ramucirumab (CYRAMZA) IVPB, 10 mg/kg = 866 mg, Intravenous, Once, 3 of 3 cycles    Administration: 866 mg (11/5/2021), 900 mg (11/26/2021), 800 mg (12/17/2021)        DOCEtaxel (TAXOTERE) chemo infusion, 75 mg/m2 = 144 mg, Intravenous, Once, 4 of 8 cycles    Dose modification: 60 mg/m2 (original dose 75 mg/m2, Cycle 5, Reason: Anticipated Tolerance)    Administration: 144 mg (10/15/2021), 144 mg (11/5/2021), 144 mg (11/26/2021), 138 mg (12/17/2021)    vinORELBine (NAVELBINE) IVPB, 25 mg/m2 = 47 mg (83.3 % of original dose 30 mg/m2), Intravenous, Once, 7 of 10 cycles    Dose modification: 25 mg/m2 (original dose 30 mg/m2, Cycle 1, Reason: Dose modified as per discussion with consulting physician)    Administration: 47 mg (2/21/2022), 47 mg (3/7/2022), 47 mg (3/21/2022), 47 mg (4/4/2022), 47 mg (4/18/2022), 47 mg (5/2/2022), 47 mg (5/16/2022), 47 mg (5/31/2022), 47 mg (6/20/2022), 47 mg (7/5/2022), 47 mg (7/25/2022), 47 mg (8/8/2022), 47 mg (8/22/2022), 47 mg (9/6/2022)           Review of Systems:  Review of Systems   Constitutional: Positive for fatigue. Eyes:        Wears glasses   Respiratory: Negative for cough and shortness of breath. Cardiovascular: Negative. Gastrointestinal: Positive for constipation. Occasional incontinence   Endocrine: Negative. Genitourinary:        Frequently incontinent    Musculoskeletal: Positive for arthralgias (Mid back, right side, intermittent pain). Skin: Negative. Allergic/Immunologic: Negative. Neurological: Negative. Hematological: Negative. Psychiatric/Behavioral: Negative.         Clinical Trial: no        Health Maintenance   Topic Date Due   • Breast Cancer Screening: Mammogram  06/23/2016   • COVID-19 Vaccine (5 - Booster for Pfizer series) 06/18/2022   • Diabetic Foot Exam  08/09/2023   • Influenza Vaccine (1) 09/01/2023   • HEMOGLOBIN A1C  10/06/2023   • Depression Remission PHQ  11/16/2023   • Fall Risk  05/16/2024   • Urinary Incontinence Screening  05/16/2024   • Medicare Annual Wellness Visit (AWV)  05/16/2024   • BMI: Followup Plan  05/16/2024   • BMI: Adult  08/14/2024   • DM Eye Exam  11/15/2024   • Colorectal Cancer Screening  04/30/2028   • Hepatitis C Screening  Completed   • Osteoporosis Screening  Completed   • Pneumococcal Vaccine: 65+ Years  Completed   • HIB Vaccine  Aged Out   • IPV Vaccine  Aged Out   • Hepatitis A Vaccine  Aged Out   • Meningococcal ACWY Vaccine  Aged Out   • HPV Vaccine  Aged Out     Patient Active Problem List   Diagnosis   • Paroxysmal atrial fibrillation ([address])   • Primary hypertension   • Adenocarcinoma of lung, stage 4 (HCC)   • Chronic diastolic heart failure ([address])   • Cancer related pain   • Type 2 diabetes mellitus with stage 3 chronic kidney disease, without long-term current use of insulin, unspecified whether stage 3a or 3b CKD ([address])   • Malignant neoplasm of bone with metastases (HCC)   • Acid reflux   • Hyperlipidemia   • Major depressive disorder with single episode, in full remission ([address])   • Vitamin D deficiency   • Adhesive capsulitis of shoulder   • Anxiety   • 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)   • CINV (chemotherapy-induced nausea and vomiting)   • Cyst of pancreas   • Pneumonitis   • Anemia in stage 3 chronic kidney disease ([address])   • Palliative care patient   • Hypercalcemia   • Chemotherapy induced neutropenia ([address])   • Former smoker   • Lung cancer ([address])   • Depression   • History of stroke   • GERD (gastroesophageal reflux disease)   • 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[address])   • Dyslipidemia   • Cognitive decline   • Anemia due to antineoplastic chemotherapy   • Acquired trigger finger of right ring finger   • Stage 4 chronic kidney disease (HCC)   • Constipation   • Bowel trouble   • Joint pain     Past Medical History:   Diagnosis Date   • A-fib [facility])    • Arthritis    • Atrial fibrillation ([address])    • Confusion    • Diabetes mellitus ([address])    • Diabetes mellitus type 2, uncomplicated ([address])     Last assessed: 8/17/17   • Encephalopathy 1/6/2022   • Essential hypertension    • Frozen shoulder     L shoulder   • GERD (gastroesophageal reflux disease)    • Hx of cancer of uterus     Last assessed: 8/21/15   • Hyperlipidemia    • Hypertension    • Hyponatremia 11/16/2016   • Lung mass     diagnosed 9/2016   • Malignant neoplasm without specification of site [facility])    • Skin cancer, basal cell     right eye area   • Stage 4 lung cancer ([address])    • Thrombocytopenia, unspecified ([address]) 1/20/2023     Past Surgical History:   Procedure Laterality Date   • APPENDECTOMY     • BRONCHOSCOPY N/A 11/17/2016    Procedure: BRONCHOSCOPY FLEXIBLE;  Surgeon: Ivis Maloney MD;  Location: [facility];  Service:    • CHOLECYSTECTOMY     • COLONOSCOPY     • COLONOSCOPY     • FL GUIDED CENTRAL VENOUS ACCESS DEVICE INSERTION  12/14/2021   • GALLBLADDER SURGERY     • HYSTERECTOMY  2000    Total abdominal   • JOINT REPLACEMENT     • LARYNGOSCOPY      Flexible Fiberoptic, (Therapeutic), Resolved: 11/17/16   • MOHS SURGERY      Micrographic Surgery Face   •  [address] INCL FLUOR GDNCE DX W/CELL WASHG SPX N/A 5/24/2017    Procedure: BRONCHOSCOPY FLEXIBLE;  Surgeon: Ann Bonilla MD;  Location: [facility]; Service: Pulmonary   • MA BRONCHOSCOPY NEEDLE BX TRACHEA MAIN STEM&/BRON N/A 11/17/2016    Procedure: EBUS; FROZEN SECTION ;  Surgeon: Ivis Maloney MD;  Location[facility];  Service: Thoracic   • MA INSJ TUNNELED CTR VAD W/SUBQ PORT AGE 5 YR/> N/A 12/14/2021    Procedure: INSERTION VENOUS PORT ( PORT-A-CATH) IR;  Surgeon: Aziza Wells DO;  [facility];  Service: Interventional Radiology   • TONSILECTOMY AND ADNOIDECTOMY     • TONSILLECTOMY     • TOTAL KNEE ARTHROPLASTY Right 09/23/2014     Family History   Problem Relation Age of Onset   • Heart disease Father         cardiac disorder   • Hypertension Father    • Arthritis Father    • Stroke Father         cerebrovascular accident   • Skin cancer Father    • Diabetes Mother    • Heart disease Mother    • Dementia Mother    • Hypertension Mother    • Thyroid disease Mother    • Cancer Maternal Grandfather         of unknown origin   • Cancer Family    • Depression Family    • Diabetes Family    • Hyperlipidemia Family         essential   • Heart disease Family    • Hypertension Family    • Stroke Family         syndrome   • Lung cancer Paternal Uncle    • Muscular dystrophy Brother      Social History     Socioeconomic History   • Marital status:       Spouse name: Not on file   • Number of children: Not on file   • Years of education: Not on file   • Highest education level: Not on file   Occupational History   • Occupation: retired   Tobacco Use   • Smoking status: Never   • Smokeless tobacco: Never   • Tobacco comments:     Quit in 2000   Vaping Use   • Vaping Use: Never used   Substance and Sexual Activity   • Alcohol use: No   • Drug use: No   • Sexual activity: Not Currently   Other Topics Concern   • Not on file   Social History Narrative    ** Merged History Encounter **         Family dynamics: Supportive  Relationship status:    Children and Ages:1 adult dtr Martina Vela, 1 adult son Long Garvin  Other important family information: Caregiver for her brother Usha Pacheco, who is disabled  Living situation: Lives with brother        Employm    ent history/source of income: Worked as a  for [employer] prior to shelter   Spirituality/ Moravian: Roman Catholic    Patient's strengths, social supports, and resources: Supportive family  Hobbies/Interests: Sewing; Crafting  [employer]    e: States dtr Martina Vela is her POA       Social Determinants of Health     Financial Resource Strain: Low Risk  (5/16/2023)    Overall Financial Resource Strain (CARDIA)    • Difficulty of Paying Living Expenses: Not hard at all   Food Insecurity: Not on file   Transportation Needs: No Transportation Needs (5/16/2023)    PRAPARE - Transportation    • Lack of Transportation (Medical): No    • Lack of Transportation (Non-Medical):  No   Physical Activity: Not on file   Stress: Not on file   Social Connections: Not on file   Intimate Partner Violence: Not on file   Housing Stability: Not on file       Current Outpatient Medications:   •  acetaminophen (TYLENOL) 500 mg tablet, Take 1-2 tablets (500-1,000 mg total) by mouth 3 (three) times a day as needed for mild pain, headaches or fever, Disp: 540 tablet, Rfl: 3  •  apixaban (Eliquis) 5 mg, TAKE 1 TABLET TWICE A DAY, Disp: 180 tablet, Rfl: 3  •  aspirin (ECOTRIN LOW STRENGTH) 81 mg EC tablet, Take 1 tablet (81 mg total) by mouth daily, Disp: 30 tablet, Rfl: 0  •  atorvastatin (LIPITOR) 40 mg tablet, TAKE 1 TABLET BY MOUTH EVERY DAY, Disp: 90 tablet, Rfl: 1  •  busPIRone (BUSPAR) 7.5 mg tablet, Take 1 tablet (7.5 mg total) by mouth daily at bedtime, Disp: 90 tablet, Rfl: 2  •  cyanocobalamin (VITAMIN B-12) 100 mcg tablet, Take by mouth daily, Disp: , Rfl:   •  folic acid (FOLVITE) 1 mg tablet, Take 1 tablet (1 mg total) by mouth daily, Disp: 90 tablet, Rfl: 1  •  gabapentin (Neurontin) 100 mg capsule, Take 2 capsules (200 mg total) by mouth 2 (two) times a day, Disp: 360 capsule, Rfl: 0  •  linaGLIPtin (Tradjenta) 5 MG TABS, Take 5 mg by mouth daily, Disp: 90 tablet, Rfl: 3  •  loperamide (IMODIUM) 2 mg capsule, Take 2 mg by mouth 4 (four) times a day as needed for diarrhea, Disp: , Rfl:   •  loratadine (CLARITIN) 10 mg tablet, Take 10 mg by mouth as needed, Disp: , Rfl:   •  metFORMIN (GLUCOPHAGE) 500 mg tablet, TAKE 1 TABLET TWICE DAILY  WITH MEALS, Disp: 180 tablet, Rfl: 3  •  metoprolol tartrate (LOPRESSOR) 25 mg tablet, Take 1 tablet (25 mg total) by mouth every 12 (twelve) hours, Disp: 180 tablet, Rfl: 3  •  omeprazole (PriLOSEC) 20 mg delayed release capsule, Take 1 capsule (20 mg total) by mouth daily, Disp: 90 capsule, Rfl: 3  •  sotalol (BETAPACE) 80 mg tablet, Take 1 tablet (80 mg total) by mouth 2 (two) times a day, Disp: 180 tablet, Rfl: 3  •  Sotorasib 120 MG TABS, Take 960 mg by mouth daily, Disp: 240 tablet, Rfl: 0  •  valsartan (DIOVAN) 160 mg tablet, TAKE 1 TABLET BY MOUTH TWICE A DAY, Disp: 180 tablet, Rfl: 3  •  venlafaxine (EFFEXOR) 37.5 mg tablet, TAKE 2 TABLETS AT BEDTIME, Disp: 180 tablet, Rfl: 0  •  LOSARTAN POTASSIUM PO, Take by mouth (Patient not taking: Reported on 8/17/2023), Disp: , Rfl:   •  oxyCODONE (OXY-IR) 5 MG capsule, Take 5 mg by mouth every 4 (four) hours as needed for moderate pain or severe pain (Patient not taking: Reported on 8/8/2023), Disp: , Rfl:   •  potassium chloride (Klor-Con M10) 10 mEq tablet, Take 1 tablet (10 mEq total) by mouth daily (Patient not taking: Reported on 8/8/2023), Disp: 90 tablet, Rfl: 3  Allergies   Allergen Reactions   • Amoxicillin Hives   • Amoxicillin Rash and Hives   • Cardizem [Diltiazem] Rash     Rash     • Statins Myalgia     Severe muscle aching  Terrible pains   • Zofran [Ondansetron] Palpitations     Vitals:    08/17/23 1333   BP: 124/82   BP Location: Left arm   Patient Position: Sitting   Cuff Size: Standard   Pulse: 75   Resp: 18   Temp: 97.8 °F (36.6 °C)   TempSrc: Temporal   SpO2: 98%   Weight: 78.9 kg (174 lb)   Height: 5' 2" (1.575 m)      Pain Score: 0-No pain Klisyri Pregnancy And Lactation Text: It is unknown if this medication can harm a developing fetus or if it is excreted in breast milk.

## 2023-08-18 ENCOUNTER — TELEPHONE (OUTPATIENT)
Dept: HEMATOLOGY ONCOLOGY | Facility: CLINIC | Age: 73
End: 2023-08-18

## 2023-08-18 PROCEDURE — 77263 THER RADIOLOGY TX PLNG CPLX: CPT | Performed by: RADIOLOGY

## 2023-08-18 NOTE — TELEPHONE ENCOUNTER
VM left for patient, advised her to hold oral Sotorasib while receiving RT therapy. My call back number was provided.

## 2023-08-18 NOTE — TELEPHONE ENCOUNTER
----- Message from Elda Chandler MD sent at 8/17/2023  4:28 PM EDT -----  Daniels Gain with me  ----- Message -----  From: Susie eLwis MD  Sent: 8/17/2023   4:17 PM EDT  To: Elda Chandler MD    Mondiana Carl Lindquist     I saw Rhona Meredith today. 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 This area did receive prior RT in 2016 so I may need to go a little slower than usual (10-15 fractions) but I will know better once we bring her in for treatment planning. I cannot find any safety data on concurrent sotorasib and RT and so I would favor holding this during treatment. Please let me know if you think this would be okay and what your thoughts would be on an appropriate washout period.      Thanks,     Will

## 2023-08-22 ENCOUNTER — TELEPHONE (OUTPATIENT)
Dept: RADIATION ONCOLOGY | Facility: HOSPITAL | Age: 73
End: 2023-08-22

## 2023-08-22 ENCOUNTER — APPOINTMENT (OUTPATIENT)
Dept: RADIATION ONCOLOGY | Facility: HOSPITAL | Age: 73
End: 2023-08-22
Attending: STUDENT IN AN ORGANIZED HEALTH CARE EDUCATION/TRAINING PROGRAM
Payer: MEDICARE

## 2023-08-22 PROCEDURE — 77334 RADIATION TREATMENT AID(S): CPT | Performed by: RADIOLOGY

## 2023-08-22 PROCEDURE — 77470 SPECIAL RADIATION TREATMENT: CPT | Performed by: RADIOLOGY

## 2023-08-22 NOTE — TELEPHONE ENCOUNTER
Pt came in for her Simulation today. After leaving, she called in and asked about taking her Sotorasib. I sent a TT to Dr. Bladimir Del Angel asking when she should stop / start. He stated that she should stop taking it one week prior to the start of her radiation and stay off until one week after her radiation has been completed. Relayed this information to the pt.

## 2023-08-24 ENCOUNTER — APPOINTMENT (OUTPATIENT)
Dept: RADIATION ONCOLOGY | Facility: HOSPITAL | Age: 73
End: 2023-08-24
Attending: STUDENT IN AN ORGANIZED HEALTH CARE EDUCATION/TRAINING PROGRAM
Payer: MEDICARE

## 2023-08-31 ENCOUNTER — APPOINTMENT (OUTPATIENT)
Dept: LAB | Facility: CLINIC | Age: 73
End: 2023-08-31
Payer: MEDICARE

## 2023-08-31 DIAGNOSIS — C34.92 ADENOCARCINOMA OF LEFT LUNG, STAGE 4 (HCC): ICD-10-CM

## 2023-08-31 DIAGNOSIS — E11.9 TYPE 2 DIABETES MELLITUS WITHOUT COMPLICATION, WITHOUT LONG-TERM CURRENT USE OF INSULIN (HCC): ICD-10-CM

## 2023-08-31 LAB
ALBUMIN SERPL BCP-MCNC: 3.7 G/DL (ref 3.5–5)
ALP SERPL-CCNC: 83 U/L (ref 34–104)
ALT SERPL W P-5'-P-CCNC: 10 U/L (ref 7–52)
ANION GAP SERPL CALCULATED.3IONS-SCNC: 8 MMOL/L
AST SERPL W P-5'-P-CCNC: 14 U/L (ref 13–39)
BASOPHILS # BLD AUTO: 0.07 THOUSANDS/ÂΜL (ref 0–0.1)
BASOPHILS NFR BLD AUTO: 1 % (ref 0–1)
BILIRUB SERPL-MCNC: 0.36 MG/DL (ref 0.2–1)
BUN SERPL-MCNC: 22 MG/DL (ref 5–25)
CALCIUM SERPL-MCNC: 9.5 MG/DL (ref 8.4–10.2)
CHLORIDE SERPL-SCNC: 103 MMOL/L (ref 96–108)
CO2 SERPL-SCNC: 27 MMOL/L (ref 21–32)
CREAT SERPL-MCNC: 1.25 MG/DL (ref 0.6–1.3)
EOSINOPHIL # BLD AUTO: 0.16 THOUSAND/ÂΜL (ref 0–0.61)
EOSINOPHIL NFR BLD AUTO: 1 % (ref 0–6)
ERYTHROCYTE [DISTWIDTH] IN BLOOD BY AUTOMATED COUNT: 14.6 % (ref 11.6–15.1)
EST. AVERAGE GLUCOSE BLD GHB EST-MCNC: 160 MG/DL
GFR SERPL CREATININE-BSD FRML MDRD: 42 ML/MIN/1.73SQ M
GLUCOSE SERPL-MCNC: 96 MG/DL (ref 65–140)
HBA1C MFR BLD: 7.2 %
HCT VFR BLD AUTO: 35.8 % (ref 34.8–46.1)
HGB BLD-MCNC: 10.8 G/DL (ref 11.5–15.4)
IMM GRANULOCYTES # BLD AUTO: 0.06 THOUSAND/UL (ref 0–0.2)
IMM GRANULOCYTES NFR BLD AUTO: 1 % (ref 0–2)
LYMPHOCYTES # BLD AUTO: 0.96 THOUSANDS/ÂΜL (ref 0.6–4.47)
LYMPHOCYTES NFR BLD AUTO: 9 % (ref 14–44)
MAGNESIUM SERPL-MCNC: 1.7 MG/DL (ref 1.9–2.7)
MCH RBC QN AUTO: 25.6 PG (ref 26.8–34.3)
MCHC RBC AUTO-ENTMCNC: 30.2 G/DL (ref 31.4–37.4)
MCV RBC AUTO: 85 FL (ref 82–98)
MONOCYTES # BLD AUTO: 0.83 THOUSAND/ÂΜL (ref 0.17–1.22)
MONOCYTES NFR BLD AUTO: 7 % (ref 4–12)
NEUTROPHILS # BLD AUTO: 9.27 THOUSANDS/ÂΜL (ref 1.85–7.62)
NEUTS SEG NFR BLD AUTO: 81 % (ref 43–75)
NRBC BLD AUTO-RTO: 0 /100 WBCS
PLATELET # BLD AUTO: 347 THOUSANDS/UL (ref 149–390)
PMV BLD AUTO: 10.4 FL (ref 8.9–12.7)
POTASSIUM SERPL-SCNC: 4.6 MMOL/L (ref 3.5–5.3)
PROT SERPL-MCNC: 7.1 G/DL (ref 6.4–8.4)
RBC # BLD AUTO: 4.22 MILLION/UL (ref 3.81–5.12)
SODIUM SERPL-SCNC: 138 MMOL/L (ref 135–147)
WBC # BLD AUTO: 11.35 THOUSAND/UL (ref 4.31–10.16)

## 2023-08-31 PROCEDURE — 85025 COMPLETE CBC W/AUTO DIFF WBC: CPT

## 2023-08-31 PROCEDURE — 80053 COMPREHEN METABOLIC PANEL: CPT

## 2023-08-31 PROCEDURE — 83735 ASSAY OF MAGNESIUM: CPT

## 2023-08-31 PROCEDURE — 83036 HEMOGLOBIN GLYCOSYLATED A1C: CPT

## 2023-08-31 PROCEDURE — 36415 COLL VENOUS BLD VENIPUNCTURE: CPT

## 2023-09-01 ENCOUNTER — TELEPHONE (OUTPATIENT)
Dept: FAMILY MEDICINE CLINIC | Facility: CLINIC | Age: 73
End: 2023-09-01

## 2023-09-01 ENCOUNTER — OFFICE VISIT (OUTPATIENT)
Dept: HEMATOLOGY ONCOLOGY | Facility: CLINIC | Age: 73
End: 2023-09-01
Payer: MEDICARE

## 2023-09-01 ENCOUNTER — APPOINTMENT (OUTPATIENT)
Dept: RADIATION ONCOLOGY | Facility: HOSPITAL | Age: 73
End: 2023-09-01
Attending: STUDENT IN AN ORGANIZED HEALTH CARE EDUCATION/TRAINING PROGRAM
Payer: MEDICARE

## 2023-09-01 VITALS
BODY MASS INDEX: 29.81 KG/M2 | RESPIRATION RATE: 17 BRPM | TEMPERATURE: 96.8 F | HEIGHT: 62 IN | SYSTOLIC BLOOD PRESSURE: 122 MMHG | DIASTOLIC BLOOD PRESSURE: 68 MMHG | OXYGEN SATURATION: 99 % | HEART RATE: 83 BPM | WEIGHT: 162 LBS

## 2023-09-01 DIAGNOSIS — C79.51 SECONDARY MALIGNANT NEOPLASM OF BONE AND BONE MARROW (HCC): ICD-10-CM

## 2023-09-01 DIAGNOSIS — T45.1X5A NEUROPATHY DUE TO CHEMOTHERAPEUTIC DRUG (HCC): ICD-10-CM

## 2023-09-01 DIAGNOSIS — C34.12 MALIGNANT NEOPLASM OF UPPER LOBE OF LEFT LUNG (HCC): Primary | ICD-10-CM

## 2023-09-01 DIAGNOSIS — D63.1 ANEMIA IN STAGE 3B CHRONIC KIDNEY DISEASE (HCC): ICD-10-CM

## 2023-09-01 DIAGNOSIS — G62.0 NEUROPATHY DUE TO CHEMOTHERAPEUTIC DRUG (HCC): ICD-10-CM

## 2023-09-01 DIAGNOSIS — C79.52 SECONDARY MALIGNANT NEOPLASM OF BONE AND BONE MARROW (HCC): ICD-10-CM

## 2023-09-01 DIAGNOSIS — C34.92 ADENOCARCINOMA OF LEFT LUNG, STAGE 4 (HCC): ICD-10-CM

## 2023-09-01 DIAGNOSIS — N18.32 ANEMIA IN STAGE 3B CHRONIC KIDNEY DISEASE (HCC): ICD-10-CM

## 2023-09-01 PROCEDURE — 77300 RADIATION THERAPY DOSE PLAN: CPT | Performed by: STUDENT IN AN ORGANIZED HEALTH CARE EDUCATION/TRAINING PROGRAM

## 2023-09-01 PROCEDURE — 77338 DESIGN MLC DEVICE FOR IMRT: CPT | Performed by: STUDENT IN AN ORGANIZED HEALTH CARE EDUCATION/TRAINING PROGRAM

## 2023-09-01 PROCEDURE — 99215 OFFICE O/P EST HI 40 MIN: CPT | Performed by: PHYSICIAN ASSISTANT

## 2023-09-01 PROCEDURE — 77301 RADIOTHERAPY DOSE PLAN IMRT: CPT | Performed by: STUDENT IN AN ORGANIZED HEALTH CARE EDUCATION/TRAINING PROGRAM

## 2023-09-01 NOTE — PROGRESS NOTES
Hematology/Oncology Outpatient Follow- up Note  Oseas Griffith 74 y.o. female MRN: @ Encounter: [id number]        Date:  9/1/2023      Assessment / Plan:    Stage 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 of the lung with left scapula involvement diagnosed on 08/2016. PDL expression more than 50%, negative for EGFR mutation, ALK  rearrangement, Ros1 mutation     Treated initially with Pembrolizumab complicated with pneumonitis and later on nivolumab with prednisone 10 mg p.o. Daily with excellent response. 2.  Disease progression in August 2018 in the left scapula with pain no new lesions by PET scan. Status post radiation therapy to the left scapula and treated with Alimta 500 milligram/meter squared, carboplatin AUC 5. After 3 cycles,  CT scan in January 2019 showed stable disease, and she was placed on maintenance Alimta 500 milligram/meter squared every 3 weeks. Alimta dose was reduced to 400 milligram/meter IV due to elevated Creatinine and  then Pemetrexed dose was reduced to 300 milligram/meter squared every 4 weeks. Cycle #38  Alimta was 6/22/21. 3.  Progression of disease 7/2021. 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 (MAP2K1 mutations are mutually exclusive with BRAF mutations)  There are FDA approved therapies indicated for other disease states such as binimetinib, cobimetinib, selumetinib, trametinib. Given the very small (0.1%) DNA amplification, use of these medications off label are likely to offer minimal benefit. Nivolumab 240 mg flat dose every 3 weeks and  carboplatin AUC 5 added to  pemetrexed which was dose reduced to 250 milligram/meter squared, initiated 8/2/2021     4. Progression of disease. Treatment changed to Taxotere 75mg/m2 10/2021.   Cyramza added with cycle 2 11/2021.     5.  Right basal ganglia and right ischemic CVA on 01/2022 with hospitalization and rehab which may have been secondary to [address].  Therapy changed to Navelbine 30 mg per m2 every other week since 02/02/2022. 7. Progression of disease on CAT scan 1/8/2022, initiated on palliative sotorasib 960 mg p.o. daily      PET scan in July 2023 showed uptake in the right paraspinal mass, otherwise stable disease     Patient has pain in the right thoracic spine, referred for palliative radiation therapy. Sotorasib on hold during RT. She will start next week. F/U with pet/ct 3 months post completion of RT. Mild anemia secondary to chemotherapy, chronic kidney disease. Hemoglobin stable around 11. HPI:    Doug Ballesteros was admitted to the hospital with arrhythmia and was found to have right lower lobe infiltrate in August 2016. She was treated with antibiotics however repeat chest x-ray showed persistent right lower lobe infiltrate. Subsequently the patient had a CT scan of the chest which showed a right perihilar mass, subcarinal lymphadenopathy, lytic lesion of the right costovertebral junction at T10 level. PET scan October 2016 showed a right perihilar mass measuring 3.5 cm with SUV of 8.9, subcarinal lymph nodes measuring 3.4 x 2.2 cm with SUV of 9.2. Nodule in the left upper lobe lung measured 2 x 1.1 cm. There was a lytic lesion involving the right 10th costovertebral junction with SUV of 14.4. Biopsy showed non-small cell carcinoma with features suggesting of adenocarcinoma positive for CK 7, CK 19, CA-19-9, ANEUDY-3, partially positive for P40, p63, negative for TTF-1. She had a history of uterine cancer in 2000 status post hysterectomy and did not require radiation or chemotherapy. She is status post bilateral oophorectomy, right knee replacement,   tonsillectomy. She used to smoke for 35 years 1 pack per day however quit smoking 21 years ago. She used hormonal replacement therapy for 3 years. She has a family history significant for skin cancer in her father and coronary artery disease in mother.   She has 2 healthy children. Treated initially with Pembrolizumab December 2016, Finished in May 2017 secondary to grade 3 pneumonitis. Initiated on prednisone. Progression: Nivolumab 240 mg flat dose every 2 weeks along with prednisone 10 mg p.o. Daily initiated April 2018- October 2018 with excellent response. Liquid biopsy showed K-MADHURI mutation G12C, no evidence of MSI high        Disease progression in August 2018 in the left scapula with pain no new lesions by PET scan. Status post radiation therapy to the left scapula and treated with Alimta 500 milligram/meter squared, carboplatin AUC 5. After 3 cycles,  CT scan in January 2019 showed stable disease. Carbo discontinued 3/2019. Maintenance Alimta 500 milligram/meter squared every 3 weeks initiated 3/2019. Cr 2/20 was 1.5. Plan was to dose reduce Alimta to 400mg/m2; however cr 2.16 2/24/20 and Alimta was held. 3/4/20:  renal u/s  Minimal fullness of the left renal collecting system without matthieu hydronephrosis. Chronic right kidney lower pole cortical scar, with adjacent parenchymal calcification measuring 5 mm      She was on prednisone 5 mg p.o. daily because of previous history of pneumonitis. CT scan chest 1/3/2020 showed no evidence of infiltration in the lung parenchyma, prednisone was reduced every other day for 1 month and then discontinued. Progression of disease 7/2021. Nivolumab 240 mg flat dose every 3 weeks and carboplatin AUC 5 added to pemetrexed which was dose reduced to 250 milligram/meter squared, initiated 8/2/2021     Progression of disease. Treatment changed to Taxotere 75mg/m2 10/2021. Cyramza added with cycle 2 11/2021.      Right basal ganglia and right ischemic CVA on 01/2022 with hospitalization and rehab which may have been secondary to [address] changed to Navelbine 30 mg per m2 every other week since 02/02/2022.      8/30/22- CT chest - Enlarged medial right lower lobe spiculated opacity now measuring 3.8 x 1.9 cm previously measured 2.1 x 1.2 suspicious for malignancy. 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        9/2022 sotorasib 960 mg in the morning without food initiated with good tolerance     CT scan on 2/2023 showed minimal enlargement since November 14, 2022, however no new lesions  right lower lobe paraspinal mass  was 3.8 x 1.9 cm on April 18, 2022, 4.1 x 2.3 cm on November 14, 2022, and now measures approximately 4.1 x 2.8 cm    PET scan in July 2023 showed uptake in the right paraspinal mass, otherwise stable disease     Patient has pain in the right thoracic spine, referred for palliative radiation therapy. Sotorasib on hold during RT. Interval History:        Review of Systems   Constitutional: Positive for appetite change (Is hungry in the evening). Negative for chills, diaphoresis, fatigue, fever and unexpected weight change. HENT:   Negative for mouth sores, nosebleeds, sore throat, tinnitus and voice change. Eyes: Negative for eye problems. Respiratory: Negative for chest tightness, cough, shortness of breath and wheezing. Cardiovascular: Negative for chest pain, leg swelling and palpitations. Gastrointestinal: Negative for abdominal distention, abdominal pain, blood in stool, constipation, diarrhea, nausea, rectal pain and vomiting. Endocrine: Negative for hot flashes. Genitourinary: Negative. Musculoskeletal: Negative for gait problem and myalgias. Skin: Negative for itching and rash. Neurological: Negative for dizziness, gait problem, headaches, light-headedness and numbness. Hematological: Negative for adenopathy. Psychiatric/Behavioral: Negative for confusion and sleep disturbance. The patient is not nervous/anxious.          Test Results:        Labs:   Lab Results   Component Value Date    HGB 10.8 (L) 08/31/2023    HCT 35.8 08/31/2023    MCV 85 08/31/2023     08/31/2023    WBC 11.35 (H) 08/31/2023    NRBC 0 08/31/2023     Lab Results Component Value Date     11/23/2015    K 4.6 08/31/2023     08/31/2023    CO2 27 08/31/2023    ANIONGAP 9 11/23/2015    BUN 22 08/31/2023    CREATININE 1.25 08/31/2023    GLUCOSE 98 01/03/2022    GLUF 211 (H) 06/01/2023    CALCIUM 9.5 08/31/2023    CORRECTEDCA 10.1 07/10/2023    AST 14 08/31/2023    ALT 10 08/31/2023    ALKPHOS 83 08/31/2023    PROT 6.8 11/23/2015    BILITOT 0.65 11/23/2015    EGFR 42 08/31/2023           Imaging: No results found. Allergies: Allergies   Allergen Reactions   • Amoxicillin Hives   • Amoxicillin Rash and Hives   • Cardizem [Diltiazem] Rash     Rash     • Statins Myalgia     Severe muscle aching  Terrible pains   • Zofran [Ondansetron] Palpitations     Current Medications: Reviewed  PMH/FH/SH:  Reviewed      Physical Exam:    There is no height or weight on file to calculate BSA. Ht Readings from Last 3 Encounters:   08/17/23 5' 2" (1.575 m)   08/14/23 5' 2" (1.575 m)   08/08/23 5' 2" (1.575 m)        Wt Readings from Last 3 Encounters:   08/17/23 78.9 kg (174 lb)   08/14/23 79.4 kg (175 lb)   08/08/23 80.2 kg (176 lb 12.8 oz)        Temp Readings from Last 3 Encounters:   08/17/23 97.8 °F (36.6 °C) (Temporal)   08/14/23 (!) 96.3 °F (35.7 °C) (Temporal)   08/08/23 (!) 96.1 °F (35.6 °C)        BP Readings from Last 3 Encounters:   08/17/23 124/82   08/14/23 128/70   08/08/23 136/80             Physical Exam  Vitals reviewed. Constitutional:       General: She is not in acute distress. Appearance: She is well-developed. She is not diaphoretic. HENT:      Head: Normocephalic and atraumatic. Eyes:      Conjunctiva/sclera: Conjunctivae normal.   Neck:      Trachea: No tracheal deviation. Cardiovascular:      Rate and Rhythm: Normal rate and regular rhythm. Heart sounds: Normal heart sounds. No murmur heard. No friction rub. No gallop. Pulmonary:      Effort: Pulmonary effort is normal. No respiratory distress.       Breath sounds: Normal breath sounds. No stridor. No wheezing, rhonchi or rales. Chest:      Chest wall: No tenderness. Abdominal:      General: There is no distension. Palpations: Abdomen is soft. Tenderness: There is no abdominal tenderness. Musculoskeletal:      Cervical back: Normal range of motion and neck supple. Lymphadenopathy:      Cervical: No cervical adenopathy. Skin:     General: Skin is warm and dry. Coloration: Skin is not pale. Findings: No erythema. Neurological:      Mental Status: She is alert and oriented to person, place, and time. Psychiatric:         Behavior: Behavior normal.         Thought Content:  Thought content normal.         Judgment: Judgment normal.         ECO    Emergency Contacts:    Extended Emergency Contact Information  Primary Emergency Contact: Patel Reinoso  Address: [address] [place] [id number] [email or web address] Phone: [phone]  Relation: Brother  Secondary Emergency Contact: LeobardoLiz  Address: [address] [email or web address] Phone: [phone]  Relation: Daughter

## 2023-09-01 NOTE — TELEPHONE ENCOUNTER
LMOM for patient to call back to schedule f/u with Dr Lv Govea that was cancelled 10/16 due to provider being out. Dr Lv Govea would like to discuss her lab results. OK to use Same Day slots and 30 minutes preferred, but can be 15.  Can schedule any time starting September

## 2023-09-03 ENCOUNTER — TELEPHONE (OUTPATIENT)
Dept: OTHER | Facility: OTHER | Age: 73
End: 2023-09-03

## 2023-09-03 NOTE — TELEPHONE ENCOUNTER
Patient called today regarding C-Diff came back and needs advise.     Paged the on call Provider Via TT.

## 2023-09-05 DIAGNOSIS — M25.50 JOINT PAIN: ICD-10-CM

## 2023-09-05 DIAGNOSIS — F32.5 MAJOR DEPRESSIVE DISORDER WITH SINGLE EPISODE, IN FULL REMISSION (HCC): ICD-10-CM

## 2023-09-05 DIAGNOSIS — C34.90 ADENOCARCINOMA OF LUNG, STAGE 4, UNSPECIFIED LATERALITY (HCC): ICD-10-CM

## 2023-09-05 DIAGNOSIS — Z51.5 PALLIATIVE CARE PATIENT: ICD-10-CM

## 2023-09-05 DIAGNOSIS — F41.9 ANXIETY: ICD-10-CM

## 2023-09-05 DIAGNOSIS — G62.0 NEUROPATHY DUE TO CHEMOTHERAPEUTIC DRUG (HCC): ICD-10-CM

## 2023-09-05 DIAGNOSIS — T45.1X5A NEUROPATHY DUE TO CHEMOTHERAPEUTIC DRUG (HCC): ICD-10-CM

## 2023-09-05 RX ORDER — GABAPENTIN 100 MG/1
CAPSULE ORAL
Qty: 360 CAPSULE | Refills: 0 | OUTPATIENT
Start: 2023-09-05

## 2023-09-05 NOTE — TELEPHONE ENCOUNTER
Patient was positive for C. Diff on 7/15/23. Patient reports that her diarrhea has improved. Patient stating that she had 3 loose stools this weekend, did not take any PRN anti-diarrhea medications, and everything resolved on it's own. Patient telling this RN, "no sense in taking an antibiotic". Patient had questions regarding her upcoming radiation appointment. Patient directed to call oncology radiation for clarification. Patient verbally understood.

## 2023-09-06 ENCOUNTER — APPOINTMENT (OUTPATIENT)
Dept: RADIATION ONCOLOGY | Facility: HOSPITAL | Age: 73
End: 2023-09-06
Attending: STUDENT IN AN ORGANIZED HEALTH CARE EDUCATION/TRAINING PROGRAM
Payer: MEDICARE

## 2023-09-06 PROCEDURE — 77427 RADIATION TX MANAGEMENT X5: CPT | Performed by: STUDENT IN AN ORGANIZED HEALTH CARE EDUCATION/TRAINING PROGRAM

## 2023-09-06 PROCEDURE — 77386 HB NTSTY MODUL RAD TX DLVR CPLX: CPT | Performed by: STUDENT IN AN ORGANIZED HEALTH CARE EDUCATION/TRAINING PROGRAM

## 2023-09-06 PROCEDURE — 77014 CHG CT GUIDANCE RADIATION THERAPY FLDS PLACEMENT: CPT | Performed by: STUDENT IN AN ORGANIZED HEALTH CARE EDUCATION/TRAINING PROGRAM

## 2023-09-07 ENCOUNTER — APPOINTMENT (OUTPATIENT)
Dept: RADIATION ONCOLOGY | Facility: HOSPITAL | Age: 73
End: 2023-09-07
Attending: STUDENT IN AN ORGANIZED HEALTH CARE EDUCATION/TRAINING PROGRAM
Payer: MEDICARE

## 2023-09-07 PROCEDURE — 77014 CHG CT GUIDANCE RADIATION THERAPY FLDS PLACEMENT: CPT | Performed by: INTERNAL MEDICINE

## 2023-09-07 PROCEDURE — 77386 HB NTSTY MODUL RAD TX DLVR CPLX: CPT | Performed by: INTERNAL MEDICINE

## 2023-09-08 ENCOUNTER — APPOINTMENT (OUTPATIENT)
Dept: RADIATION ONCOLOGY | Facility: HOSPITAL | Age: 73
End: 2023-09-08
Attending: STUDENT IN AN ORGANIZED HEALTH CARE EDUCATION/TRAINING PROGRAM
Payer: MEDICARE

## 2023-09-08 ENCOUNTER — TELEPHONE (OUTPATIENT)
Dept: PALLIATIVE MEDICINE | Facility: CLINIC | Age: 73
End: 2023-09-08

## 2023-09-08 DIAGNOSIS — C79.51 SECONDARY MALIGNANT NEOPLASM OF BONE AND BONE MARROW (HCC): ICD-10-CM

## 2023-09-08 DIAGNOSIS — C79.52 SECONDARY MALIGNANT NEOPLASM OF BONE AND BONE MARROW (HCC): ICD-10-CM

## 2023-09-08 DIAGNOSIS — Z51.5 PALLIATIVE CARE PATIENT: ICD-10-CM

## 2023-09-08 DIAGNOSIS — M25.50 JOINT PAIN: ICD-10-CM

## 2023-09-08 DIAGNOSIS — G89.3 CANCER RELATED PAIN: Chronic | ICD-10-CM

## 2023-09-08 DIAGNOSIS — G62.0 NEUROPATHY DUE TO CHEMOTHERAPEUTIC DRUG (HCC): ICD-10-CM

## 2023-09-08 DIAGNOSIS — F32.5 MAJOR DEPRESSIVE DISORDER WITH SINGLE EPISODE, IN FULL REMISSION (HCC): ICD-10-CM

## 2023-09-08 DIAGNOSIS — T45.1X5A NEUROPATHY DUE TO CHEMOTHERAPEUTIC DRUG (HCC): ICD-10-CM

## 2023-09-08 DIAGNOSIS — E11.9 CONTROLLED TYPE 2 DIABETES MELLITUS WITHOUT COMPLICATION, WITHOUT LONG-TERM CURRENT USE OF INSULIN (HCC): ICD-10-CM

## 2023-09-08 DIAGNOSIS — F41.9 ANXIETY: ICD-10-CM

## 2023-09-08 DIAGNOSIS — C34.90 ADENOCARCINOMA OF LUNG, STAGE 4, UNSPECIFIED LATERALITY (HCC): Primary | ICD-10-CM

## 2023-09-08 PROCEDURE — 77386 HB NTSTY MODUL RAD TX DLVR CPLX: CPT | Performed by: STUDENT IN AN ORGANIZED HEALTH CARE EDUCATION/TRAINING PROGRAM

## 2023-09-08 PROCEDURE — 77014 CHG CT GUIDANCE RADIATION THERAPY FLDS PLACEMENT: CPT | Performed by: STUDENT IN AN ORGANIZED HEALTH CARE EDUCATION/TRAINING PROGRAM

## 2023-09-08 RX ORDER — GABAPENTIN 100 MG/1
200 CAPSULE ORAL 2 TIMES DAILY
Qty: 360 CAPSULE | Refills: 0 | Status: SHIPPED | OUTPATIENT
Start: 2023-09-08

## 2023-09-08 RX ORDER — OXYCODONE HYDROCHLORIDE 5 MG/1
5 CAPSULE ORAL EVERY 4 HOURS PRN
Qty: 60 CAPSULE | Refills: 0 | Status: SHIPPED | OUTPATIENT
Start: 2023-09-08

## 2023-09-08 RX ORDER — BUSPIRONE HYDROCHLORIDE 7.5 MG/1
7.5 TABLET ORAL
Qty: 90 TABLET | Refills: 0 | Status: SHIPPED | OUTPATIENT
Start: 2023-09-08

## 2023-09-08 RX ORDER — VENLAFAXINE 37.5 MG/1
75 TABLET ORAL
Qty: 180 TABLET | Refills: 0 | Status: SHIPPED | OUTPATIENT
Start: 2023-09-08

## 2023-09-08 NOTE — TELEPHONE ENCOUNTER
Patient called asking to see if she get something for increased anxiety and pain. She is wondering if it from the radiation.     Last visit 8/14/23    Next visit 11/6/23

## 2023-09-11 ENCOUNTER — APPOINTMENT (OUTPATIENT)
Dept: RADIATION ONCOLOGY | Facility: HOSPITAL | Age: 73
End: 2023-09-11
Attending: STUDENT IN AN ORGANIZED HEALTH CARE EDUCATION/TRAINING PROGRAM
Payer: MEDICARE

## 2023-09-11 PROCEDURE — 77386 HB NTSTY MODUL RAD TX DLVR CPLX: CPT | Performed by: INTERNAL MEDICINE

## 2023-09-11 PROCEDURE — 77014 CHG CT GUIDANCE RADIATION THERAPY FLDS PLACEMENT: CPT | Performed by: INTERNAL MEDICINE

## 2023-09-12 ENCOUNTER — APPOINTMENT (OUTPATIENT)
Dept: RADIATION ONCOLOGY | Facility: HOSPITAL | Age: 73
End: 2023-09-12
Attending: STUDENT IN AN ORGANIZED HEALTH CARE EDUCATION/TRAINING PROGRAM
Payer: MEDICARE

## 2023-09-12 DIAGNOSIS — M79.10 MYALGIA DUE TO STATIN: ICD-10-CM

## 2023-09-12 DIAGNOSIS — T46.6X5A MYALGIA DUE TO STATIN: ICD-10-CM

## 2023-09-12 PROCEDURE — 77386 HB NTSTY MODUL RAD TX DLVR CPLX: CPT | Performed by: RADIOLOGY

## 2023-09-12 PROCEDURE — 77336 RADIATION PHYSICS CONSULT: CPT | Performed by: STUDENT IN AN ORGANIZED HEALTH CARE EDUCATION/TRAINING PROGRAM

## 2023-09-12 PROCEDURE — 77014 CHG CT GUIDANCE RADIATION THERAPY FLDS PLACEMENT: CPT | Performed by: RADIOLOGY

## 2023-09-12 RX ORDER — ATORVASTATIN CALCIUM 40 MG/1
TABLET, FILM COATED ORAL
Qty: 90 TABLET | Refills: 1 | Status: SHIPPED | OUTPATIENT
Start: 2023-09-12

## 2023-09-13 ENCOUNTER — APPOINTMENT (OUTPATIENT)
Dept: RADIATION ONCOLOGY | Facility: HOSPITAL | Age: 73
End: 2023-09-13
Attending: STUDENT IN AN ORGANIZED HEALTH CARE EDUCATION/TRAINING PROGRAM
Payer: MEDICARE

## 2023-09-13 PROCEDURE — 77427 RADIATION TX MANAGEMENT X5: CPT | Performed by: STUDENT IN AN ORGANIZED HEALTH CARE EDUCATION/TRAINING PROGRAM

## 2023-09-13 PROCEDURE — 77386 HB NTSTY MODUL RAD TX DLVR CPLX: CPT | Performed by: RADIOLOGY

## 2023-09-13 PROCEDURE — 77014 CHG CT GUIDANCE RADIATION THERAPY FLDS PLACEMENT: CPT | Performed by: RADIOLOGY

## 2023-09-14 ENCOUNTER — APPOINTMENT (OUTPATIENT)
Dept: RADIATION ONCOLOGY | Facility: HOSPITAL | Age: 73
End: 2023-09-14
Attending: STUDENT IN AN ORGANIZED HEALTH CARE EDUCATION/TRAINING PROGRAM
Payer: MEDICARE

## 2023-09-14 PROCEDURE — 77014 CHG CT GUIDANCE RADIATION THERAPY FLDS PLACEMENT: CPT | Performed by: RADIOLOGY

## 2023-09-14 PROCEDURE — 77386 HB NTSTY MODUL RAD TX DLVR CPLX: CPT | Performed by: RADIOLOGY

## 2023-09-15 ENCOUNTER — APPOINTMENT (OUTPATIENT)
Dept: RADIATION ONCOLOGY | Facility: HOSPITAL | Age: 73
End: 2023-09-15
Attending: STUDENT IN AN ORGANIZED HEALTH CARE EDUCATION/TRAINING PROGRAM
Payer: MEDICARE

## 2023-09-15 PROCEDURE — 77014 CHG CT GUIDANCE RADIATION THERAPY FLDS PLACEMENT: CPT | Performed by: STUDENT IN AN ORGANIZED HEALTH CARE EDUCATION/TRAINING PROGRAM

## 2023-09-15 PROCEDURE — 77386 HB NTSTY MODUL RAD TX DLVR CPLX: CPT | Performed by: STUDENT IN AN ORGANIZED HEALTH CARE EDUCATION/TRAINING PROGRAM

## 2023-09-18 ENCOUNTER — APPOINTMENT (OUTPATIENT)
Dept: RADIATION ONCOLOGY | Facility: HOSPITAL | Age: 73
End: 2023-09-18
Attending: STUDENT IN AN ORGANIZED HEALTH CARE EDUCATION/TRAINING PROGRAM
Payer: MEDICARE

## 2023-09-18 PROCEDURE — 77014 CHG CT GUIDANCE RADIATION THERAPY FLDS PLACEMENT: CPT | Performed by: INTERNAL MEDICINE

## 2023-09-18 PROCEDURE — 77386 HB NTSTY MODUL RAD TX DLVR CPLX: CPT | Performed by: INTERNAL MEDICINE

## 2023-09-19 ENCOUNTER — APPOINTMENT (OUTPATIENT)
Dept: RADIATION ONCOLOGY | Facility: HOSPITAL | Age: 73
End: 2023-09-19
Attending: STUDENT IN AN ORGANIZED HEALTH CARE EDUCATION/TRAINING PROGRAM
Payer: MEDICARE

## 2023-09-19 PROCEDURE — 77014 CHG CT GUIDANCE RADIATION THERAPY FLDS PLACEMENT: CPT | Performed by: INTERNAL MEDICINE

## 2023-09-19 PROCEDURE — 77386 HB NTSTY MODUL RAD TX DLVR CPLX: CPT | Performed by: INTERNAL MEDICINE

## 2023-09-19 PROCEDURE — 77336 RADIATION PHYSICS CONSULT: CPT | Performed by: STUDENT IN AN ORGANIZED HEALTH CARE EDUCATION/TRAINING PROGRAM

## 2023-09-20 ENCOUNTER — APPOINTMENT (OUTPATIENT)
Dept: RADIATION ONCOLOGY | Facility: HOSPITAL | Age: 73
End: 2023-09-20
Attending: STUDENT IN AN ORGANIZED HEALTH CARE EDUCATION/TRAINING PROGRAM
Payer: MEDICARE

## 2023-09-20 PROCEDURE — 77386 HB NTSTY MODUL RAD TX DLVR CPLX: CPT | Performed by: RADIOLOGY

## 2023-09-20 PROCEDURE — 77427 RADIATION TX MANAGEMENT X5: CPT | Performed by: STUDENT IN AN ORGANIZED HEALTH CARE EDUCATION/TRAINING PROGRAM

## 2023-09-20 PROCEDURE — 77014 CHG CT GUIDANCE RADIATION THERAPY FLDS PLACEMENT: CPT | Performed by: RADIOLOGY

## 2023-09-21 ENCOUNTER — APPOINTMENT (OUTPATIENT)
Dept: RADIATION ONCOLOGY | Facility: HOSPITAL | Age: 73
End: 2023-09-21
Attending: STUDENT IN AN ORGANIZED HEALTH CARE EDUCATION/TRAINING PROGRAM
Payer: MEDICARE

## 2023-09-21 PROCEDURE — 77014 CHG CT GUIDANCE RADIATION THERAPY FLDS PLACEMENT: CPT | Performed by: INTERNAL MEDICINE

## 2023-09-21 PROCEDURE — 77386 HB NTSTY MODUL RAD TX DLVR CPLX: CPT | Performed by: INTERNAL MEDICINE

## 2023-09-22 ENCOUNTER — APPOINTMENT (OUTPATIENT)
Dept: RADIATION ONCOLOGY | Facility: HOSPITAL | Age: 73
End: 2023-09-22
Attending: STUDENT IN AN ORGANIZED HEALTH CARE EDUCATION/TRAINING PROGRAM
Payer: MEDICARE

## 2023-09-22 PROCEDURE — 77014 CHG CT GUIDANCE RADIATION THERAPY FLDS PLACEMENT: CPT | Performed by: STUDENT IN AN ORGANIZED HEALTH CARE EDUCATION/TRAINING PROGRAM

## 2023-09-22 PROCEDURE — 77386 HB NTSTY MODUL RAD TX DLVR CPLX: CPT | Performed by: STUDENT IN AN ORGANIZED HEALTH CARE EDUCATION/TRAINING PROGRAM

## 2023-09-25 ENCOUNTER — APPOINTMENT (OUTPATIENT)
Dept: RADIATION ONCOLOGY | Facility: HOSPITAL | Age: 73
End: 2023-09-25
Attending: STUDENT IN AN ORGANIZED HEALTH CARE EDUCATION/TRAINING PROGRAM
Payer: MEDICARE

## 2023-09-25 PROCEDURE — 77014 CHG CT GUIDANCE RADIATION THERAPY FLDS PLACEMENT: CPT | Performed by: STUDENT IN AN ORGANIZED HEALTH CARE EDUCATION/TRAINING PROGRAM

## 2023-09-25 PROCEDURE — 77386 HB NTSTY MODUL RAD TX DLVR CPLX: CPT | Performed by: STUDENT IN AN ORGANIZED HEALTH CARE EDUCATION/TRAINING PROGRAM

## 2023-09-26 ENCOUNTER — APPOINTMENT (OUTPATIENT)
Dept: RADIATION ONCOLOGY | Facility: HOSPITAL | Age: 73
End: 2023-09-26
Attending: STUDENT IN AN ORGANIZED HEALTH CARE EDUCATION/TRAINING PROGRAM
Payer: MEDICARE

## 2023-09-26 PROCEDURE — 77014 CHG CT GUIDANCE RADIATION THERAPY FLDS PLACEMENT: CPT | Performed by: RADIOLOGY

## 2023-09-26 PROCEDURE — 77336 RADIATION PHYSICS CONSULT: CPT | Performed by: STUDENT IN AN ORGANIZED HEALTH CARE EDUCATION/TRAINING PROGRAM

## 2023-09-26 PROCEDURE — 77386 HB NTSTY MODUL RAD TX DLVR CPLX: CPT | Performed by: RADIOLOGY

## 2023-09-27 ENCOUNTER — TELEPHONE (OUTPATIENT)
Dept: RADIATION ONCOLOGY | Facility: HOSPITAL | Age: 73
End: 2023-09-27

## 2023-09-27 ENCOUNTER — APPOINTMENT (OUTPATIENT)
Dept: RADIATION ONCOLOGY | Facility: HOSPITAL | Age: 73
End: 2023-09-27
Payer: MEDICARE

## 2023-09-27 ENCOUNTER — CLINICAL SUPPORT (OUTPATIENT)
Dept: PALLIATIVE MEDICINE | Facility: CLINIC | Age: 73
End: 2023-09-27

## 2023-09-27 ENCOUNTER — HOSPITAL ENCOUNTER (EMERGENCY)
Facility: HOSPITAL | Age: 73
Discharge: HOME | End: 2023-09-27
Attending: EMERGENCY MEDICINE
Payer: MEDICARE

## 2023-09-27 VITALS
SYSTOLIC BLOOD PRESSURE: 165 MMHG | OXYGEN SATURATION: 99 % | TEMPERATURE: 98 F | DIASTOLIC BLOOD PRESSURE: 67 MMHG | RESPIRATION RATE: 18 BRPM | HEART RATE: 85 BPM

## 2023-09-27 DIAGNOSIS — T45.1X5A CINV (CHEMOTHERAPY-INDUCED NAUSEA AND VOMITING): Primary | Chronic | ICD-10-CM

## 2023-09-27 DIAGNOSIS — R11.2 CINV (CHEMOTHERAPY-INDUCED NAUSEA AND VOMITING): Primary | Chronic | ICD-10-CM

## 2023-09-27 DIAGNOSIS — R11.11 VOMITING WITHOUT NAUSEA, UNSPECIFIED VOMITING TYPE: Primary | ICD-10-CM

## 2023-09-27 LAB
ALBUMIN SERPL BCP-MCNC: 3.4 G/DL (ref 3.5–5)
ALP SERPL-CCNC: 325 U/L (ref 34–104)
ALT SERPL W P-5'-P-CCNC: 12 U/L (ref 7–52)
ANION GAP SERPL CALCULATED.3IONS-SCNC: 10 MMOL/L
AST SERPL W P-5'-P-CCNC: 14 U/L (ref 13–39)
BASOPHILS # BLD AUTO: 0.03 THOUSANDS/ÂΜL (ref 0–0.1)
BASOPHILS NFR BLD AUTO: 0 % (ref 0–1)
BILIRUB SERPL-MCNC: 0.61 MG/DL (ref 0.2–1)
BUN SERPL-MCNC: 18 MG/DL (ref 5–25)
CALCIUM ALBUM COR SERPL-MCNC: 9.9 MG/DL (ref 8.3–10.1)
CALCIUM SERPL-MCNC: 9.4 MG/DL (ref 8.4–10.2)
CHLORIDE SERPL-SCNC: 101 MMOL/L (ref 96–108)
CO2 SERPL-SCNC: 24 MMOL/L (ref 21–32)
CREAT SERPL-MCNC: 1.06 MG/DL (ref 0.6–1.3)
EOSINOPHIL # BLD AUTO: 0.09 THOUSAND/ÂΜL (ref 0–0.61)
EOSINOPHIL NFR BLD AUTO: 1 % (ref 0–6)
ERYTHROCYTE [DISTWIDTH] IN BLOOD BY AUTOMATED COUNT: 15 % (ref 11.6–15.1)
GFR SERPL CREATININE-BSD FRML MDRD: 51 ML/MIN/1.73SQ M
GLUCOSE SERPL-MCNC: 112 MG/DL (ref 65–140)
HCT VFR BLD AUTO: 33.8 % (ref 34.8–46.1)
HGB BLD-MCNC: 10.2 G/DL (ref 11.5–15.4)
IMM GRANULOCYTES # BLD AUTO: 0.04 THOUSAND/UL (ref 0–0.2)
IMM GRANULOCYTES NFR BLD AUTO: 0 % (ref 0–2)
LIPASE SERPL-CCNC: 36 U/L (ref 11–82)
LYMPHOCYTES # BLD AUTO: 0.43 THOUSANDS/ÂΜL (ref 0.6–4.47)
LYMPHOCYTES NFR BLD AUTO: 4 % (ref 14–44)
MCH RBC QN AUTO: 24.6 PG (ref 26.8–34.3)
MCHC RBC AUTO-ENTMCNC: 30.2 G/DL (ref 31.4–37.4)
MCV RBC AUTO: 82 FL (ref 82–98)
MONOCYTES # BLD AUTO: 0.73 THOUSAND/ÂΜL (ref 0.17–1.22)
MONOCYTES NFR BLD AUTO: 7 % (ref 4–12)
NEUTROPHILS # BLD AUTO: 8.5 THOUSANDS/ÂΜL (ref 1.85–7.62)
NEUTS SEG NFR BLD AUTO: 88 % (ref 43–75)
NRBC BLD AUTO-RTO: 0 /100 WBCS
PLATELET # BLD AUTO: 340 THOUSANDS/UL (ref 149–390)
PMV BLD AUTO: 10.2 FL (ref 8.9–12.7)
POTASSIUM SERPL-SCNC: 4 MMOL/L (ref 3.5–5.3)
PROT SERPL-MCNC: 6.9 G/DL (ref 6.4–8.4)
RBC # BLD AUTO: 4.14 MILLION/UL (ref 3.81–5.12)
SODIUM SERPL-SCNC: 135 MMOL/L (ref 135–147)
WBC # BLD AUTO: 9.82 THOUSAND/UL (ref 4.31–10.16)

## 2023-09-27 PROCEDURE — 80053 COMPREHEN METABOLIC PANEL: CPT

## 2023-09-27 PROCEDURE — 36415 COLL VENOUS BLD VENIPUNCTURE: CPT

## 2023-09-27 PROCEDURE — 96374 THER/PROPH/DIAG INJ IV PUSH: CPT

## 2023-09-27 PROCEDURE — 83690 ASSAY OF LIPASE: CPT

## 2023-09-27 PROCEDURE — 99283 EMERGENCY DEPT VISIT LOW MDM: CPT

## 2023-09-27 PROCEDURE — 85025 COMPLETE CBC W/AUTO DIFF WBC: CPT

## 2023-09-27 PROCEDURE — NC001 PR NO CHARGE

## 2023-09-27 PROCEDURE — 96361 HYDRATE IV INFUSION ADD-ON: CPT

## 2023-09-27 RX ORDER — DEXAMETHASONE SODIUM PHOSPHATE 4 MG/ML
4 INJECTION, SOLUTION INTRA-ARTICULAR; INTRALESIONAL; INTRAMUSCULAR; INTRAVENOUS; SOFT TISSUE EVERY 12 HOURS SCHEDULED
Status: DISCONTINUED | OUTPATIENT
Start: 2023-09-27 | End: 2023-09-27

## 2023-09-27 RX ADMIN — SODIUM CHLORIDE 1000 ML: 0.9 INJECTION, SOLUTION INTRAVENOUS at 17:40

## 2023-09-27 RX ADMIN — DEXAMETHASONE SODIUM PHOSPHATE 4 MG: 4 INJECTION INTRA-ARTICULAR; INTRALESIONAL; INTRAMUSCULAR; INTRAVENOUS; SOFT TISSUE at 17:41

## 2023-09-27 NOTE — PROGRESS NOTES
This note was not shared with the patient due to this is a psychotherapy note     Palliative Supportive Care  met with patient/family to continue to provide emotional support and guidance. Updated biopsychosocial information relevant to support:  LCSW was able to provide an overview of mental health support within the Palliative office. Patient states that she has not been feeling well after her last radiation treatment. Dr. Jose Luis Fajardo recommended that she be evaluated in the ED for possible IVF, steroids, and/or imaging. Patient is agreeable. Identified areas of need include:  Patient has always been a caregiver (, mom, and brother). Brother is a quadriplegic (muscular dystrophy) and requires assistance with all ADLs. Patient feels torn with caring for herself and caring for her brother. Patient's children (51,47) and cousin are concerned about her health while being the primary caregiver for her brother. They have a  1x per week       Resources provided:  Suggestion to develop a "what if" plan for self and brother. Starting the difficult conversation of mortality and financial planning. Areas that need future follow-up include: Over the next week patient will attempt a 20 minute conversation with her brother about planning for the future.       I have spent 60 minutes with Patient  today in which greater than 50% of this time was spent in counseling/coordination of care     Palliative  will follow-up as requested by patient, family, and primary team.  Please contact with any specific requests

## 2023-09-27 NOTE — ED PROVIDER NOTES
History  Chief Complaint   Patient presents with   • Vomiting     Pt reports getting last radiation treatment yesterday and feeling nauseous/vomiting since. Denies CP, SOB, or abd pain. Mk Dotson is a 74 y.o. F w/ PMH of stage IV adenocarcinoma of the RLL, HTN, HLD, stage 4 CKD, A-fib on eliquis, diastolic HF and L4AZ. She received her final dose of palliative radiation therapy yesterday morning to a para-esophageal mass in her right lower lobe. Today, the patient has been unable to swallow solids and thickened liquids. She states that when she swallows, the bolus gets stuck in her upper esophagus and she eventually vomits it out. She says that she does not have any nausea associated with the vomiting. Pt called her radiation oncologist, who recommended she come to the ED for IV hydration and steroids to reduce inflammation. She states that swallowing is not painful, but she has the sensation of food getting stuck in her esophagus and subsequent need to vomit. Pt has been able to tolerate sips of water PO. Prior to Admission Medications   Prescriptions Last Dose Informant Patient Reported? Taking?    LOSARTAN POTASSIUM PO   Yes No   Sig: Take by mouth   Patient not taking: Reported on 8/17/2023   Sotorasib 120 MG TABS   No No   Sig: Take 960 mg by mouth daily   acetaminophen (TYLENOL) 500 mg tablet   No No   Sig: Take 1-2 tablets (500-1,000 mg total) by mouth 3 (three) times a day as needed for mild pain, headaches or fever   apixaban (Eliquis) 5 mg   No No   Sig: TAKE 1 TABLET TWICE A DAY   aspirin (ECOTRIN LOW STRENGTH) 81 mg EC tablet  Self No No   Sig: Take 1 tablet (81 mg total) by mouth daily   atorvastatin (LIPITOR) 40 mg tablet   No No   Sig: TAKE 1 TABLET BY MOUTH EVERY DAY   busPIRone (BUSPAR) 7.5 mg tablet   No No   Sig: Take 1 tablet (7.5 mg total) by mouth daily at bedtime   cyanocobalamin (VITAMIN B-12) 100 mcg tablet  Self Yes No   Sig: Take by mouth daily   folic acid (FOLVITE) 1 mg tablet  Self No No   Sig: Take 1 tablet (1 mg total) by mouth daily   gabapentin (Neurontin) 100 mg capsule   No No   Sig: Take 2 capsules (200 mg total) by mouth 2 (two) times a day   linaGLIPtin (Tradjenta) 5 MG TABS   No No   Sig: Take 5 mg by mouth daily   loperamide (IMODIUM) 2 mg capsule  Self Yes No   Sig: Take 2 mg by mouth 4 (four) times a day as needed for diarrhea   loratadine (CLARITIN) 10 mg tablet  Self Yes No   Sig: Take 10 mg by mouth as needed   metFORMIN (GLUCOPHAGE) 500 mg tablet   No No   Sig: Take 1 tablet (500 mg total) by mouth 2 (two) times a day with meals   metoprolol tartrate (LOPRESSOR) 25 mg tablet   No No   Sig: Take 1 tablet (25 mg total) by mouth every 12 (twelve) hours   omeprazole (PriLOSEC) 20 mg delayed release capsule  Self No No   Sig: Take 1 capsule (20 mg total) by mouth daily   oxyCODONE (OXY-IR) 5 MG capsule   No No   Sig: Take 1 capsule (5 mg total) by mouth every 4 (four) hours as needed for moderate pain or severe pain Max Daily Amount: 30 mg   potassium chloride (Klor-Con M10) 10 mEq tablet  Self No No   Sig: Take 1 tablet (10 mEq total) by mouth daily   Patient not taking: Reported on 8/8/2023   sotalol (BETAPACE) 80 mg tablet   No No   Sig: Take 1 tablet (80 mg total) by mouth 2 (two) times a day   valsartan (DIOVAN) 160 mg tablet   No No   Sig: TAKE 1 TABLET BY MOUTH TWICE A DAY   venlafaxine (EFFEXOR) 37.5 mg tablet   No No   Sig: Take 2 tablets (75 mg total) by mouth daily at bedtime      Facility-Administered Medications: None       Past Medical History:   Diagnosis Date   • A-fib [facility])    • Arthritis    • Atrial fibrillation (HCC)    • Confusion    • Diabetes mellitus (HCC)    • Diabetes mellitus type 2, uncomplicated (HCC)     Last assessed: 8/17/17   • Encephalopathy 1/6/2022   • Essential hypertension    • Frozen shoulder     L shoulder   • GERD (gastroesophageal reflux disease)    • Hx of cancer of uterus     Last assessed: 8/21/15   • Hyperlipidemia    • Hypertension    • Hyponatremia 11/16/2016   • Lung mass     diagnosed 9/2016   • Malignant neoplasm without specification of site [facility])    • Skin cancer, basal cell     right eye area   • Stage 4 lung cancer ([address])    • Thrombocytopenia, unspecified ([address]) 1/20/2023       Past Surgical History:   Procedure Laterality Date   • APPENDECTOMY     • BRONCHOSCOPY N/A 11/17/2016    Procedure: BRONCHOSCOPY FLEXIBLE;  Surgeon: Cindy Torres MD;  [facility];  Service:    • CHOLECYSTECTOMY     • COLONOSCOPY     • COLONOSCOPY     • FL GUIDED CENTRAL VENOUS ACCESS DEVICE INSERTION  12/14/2021   • GALLBLADDER SURGERY     • HYSTERECTOMY  2000    Total abdominal   • JOINT REPLACEMENT     • LARYNGOSCOPY      Flexible Fiberoptic, (Therapeutic), Resolved: 11/17/16   • MOHS SURGERY      Micrographic Surgery Face   •  [address] INCL FLUOR GDNCE DX W/CELL WASHG SPX N/A 5/24/2017    Procedure: BRONCHOSCOPY FLEXIBLE;  Surgeon: Sammy Joyce MD;  Location: [facility];   Service: Pulmonary   • ME BRONCHOSCOPY NEEDLE BX TRACHEA MAIN STEM&/BRON N/A 11/17/2016    Procedure: EBUS; FROZEN SECTION ;  Surgeon: Cindy Torres MD;  Location[facility];  Service: Thoracic   • ME INSJ TUNNELED CTR VAD W/SUBQ PORT AGE 5 YR/> N/A 12/14/2021    Procedure: INSERTION VENOUS PORT ( PORT-A-CATH) IR;  Surgeon: Chula Elena DO;  [facility];  Service: Interventional Radiology   • TONSILECTOMY AND ADNOIDECTOMY     • TONSILLECTOMY     • TOTAL KNEE ARTHROPLASTY Right 09/23/2014       Family History   Problem Relation Age of Onset   • Heart disease Father         cardiac disorder   • Hypertension Father    • Arthritis Father    • Stroke Father         cerebrovascular accident   • Skin cancer Father    • Diabetes Mother    • Heart disease Mother    • Dementia Mother    • Hypertension Mother    • Thyroid disease Mother    • Cancer Maternal Grandfather         of unknown origin   • Cancer Family    • Depression Family    • Diabetes Family    • Hyperlipidemia Family         essential   • Heart disease Family    • Hypertension Family    • Stroke Family         syndrome   • Lung cancer Paternal Uncle    • Muscular dystrophy Brother      I have reviewed and agree with the history as documented. E-Cigarette/Vaping   • E-Cigarette Use Never User      E-Cigarette/Vaping Substances   • Nicotine No    • THC No    • CBD No    • Flavoring No    • Other No    • Unknown No      Social History     Tobacco Use   • Smoking status: Never   • Smokeless tobacco: Never   • Tobacco comments:     Quit in 2000   Vaping Use   • Vaping Use: Never used   Substance Use Topics   • Alcohol use: No   • Drug use: No        Review of Systems   Constitutional: Negative. Negative for chills and fever. HENT: Positive for trouble swallowing. Negative for congestion. Eyes: Negative. Respiratory: Negative for shortness of breath and wheezing. Cardiovascular: Negative. Gastrointestinal: Positive for vomiting. Negative for abdominal distention, abdominal pain, blood in stool, diarrhea and nausea. Endocrine: Negative. Genitourinary: Negative. Musculoskeletal: Negative. Neurological: Negative. Physical Exam  ED Triage Vitals   Temperature Pulse Respirations Blood Pressure SpO2   09/27/23 1516 09/27/23 1514 09/27/23 1514 09/27/23 1516 09/27/23 1514   98 °F (36.7 °C) 80 18 162/72 99 %      Temp src Heart Rate Source Patient Position - Orthostatic VS BP Location FiO2 (%)   -- -- -- -- --             Pain Score       --                    Orthostatic Vital Signs  Vitals:    09/27/23 1514 09/27/23 1516   BP:  162/72   Pulse: 80        Physical Exam  Vitals reviewed. Constitutional:       General: She is not in acute distress. Appearance: Normal appearance. She is not ill-appearing. HENT:      Head: Normocephalic and atraumatic. Nose: Nose normal. No congestion or rhinorrhea.       Mouth/Throat:      Mouth: Mucous membranes are moist.      Pharynx: Oropharynx is clear. Eyes:      Extraocular Movements: Extraocular movements intact. Conjunctiva/sclera: Conjunctivae normal.      Pupils: Pupils are equal, round, and reactive to light. Cardiovascular:      Rate and Rhythm: Normal rate and regular rhythm. Pulses: Normal pulses. Heart sounds: Normal heart sounds. Pulmonary:      Effort: Pulmonary effort is normal.      Breath sounds: Normal breath sounds. Abdominal:      General: Abdomen is flat. Bowel sounds are normal. There is no distension. Palpations: Abdomen is soft. Tenderness: There is no abdominal tenderness. Musculoskeletal:      Right lower leg: No edema. Left lower leg: No edema. Neurological:      General: No focal deficit present. Mental Status: She is alert and oriented to person, place, and time. Mental status is at baseline. ED Medications  Medications   sodium chloride 0.9 % bolus 1,000 mL (has no administration in time range)   dexamethasone (DECADRON) injection 4 mg (has no administration in time range)       Diagnostic Studies  Results Reviewed     Procedure Component Value Units Date/Time    Comprehensive metabolic panel [[id number]]     Lab Status: No result Specimen: Blood     Lipase [[id number]]     Lab Status: No result Specimen: Blood     CBC and differential [[id number]]     Lab Status: No result Specimen: Blood                  No orders to display         Procedures  Procedures      ED Course        74 y.o. F w/ PMH of stage IV adenocarcinoma of RLL s/p palliative radiation therapy to paraesophageal mass yesterday. C/o difficulty swallowing with sensation of food getting stuck and need to vomit. VS: She was afebrile and HD stable. PE: unremarkable      CBC, CMP and lipase were drawn given history of vomiting. Pt was started on IVF for hydration and given IV decadron to reduce possible inflammation of para-esophageal mass that was recently treated with radiation therapy.      CBC and CMP were not significantly changed from baseline. Lipase was not elevated. Pt received 4 mg IV decadron and 1L NS bolus. Plan: PO challenge    Pt was able to tolerate fluids and soft foods PO. Deemed stable for discharge with outpatient follow-up. SBIRT 20yo+    Flowsheet Row Most Recent Value   Initial Alcohol Screen: US AUDIT-C     1. How often do you have a drink containing alcohol? 0 Filed at: 09/27/2023 1515   2. How many drinks containing alcohol do you have on a typical day you are drinking? 0 Filed at: 09/27/2023 1515   3b. FEMALE Any Age, or MALE 65+: How often do you have 4 or more drinks on one occassion? 0 Filed at: 09/27/2023 1515   Audit-C Score 0 Filed at: 09/27/2023 1515   SHAY: How many times in the past year have you. .. Used an illegal drug or used a prescription medication for non-medical reasons? Never Filed at: 09/27/2023 1515                MDM      Disposition  Final diagnoses:   None     ED Disposition     None      Follow-up Information    None         Patient's Medications   Discharge Prescriptions    No medications on file     No discharge procedures on file. PDMP Review       Value Time User    PDMP Reviewed  Yes 9/8/2023  3:42 PM Dianna Jiang MD           ED Provider  Attending physically available and evaluated Jcarlos Wood. I managed the patient along with the ED Attending.     Electronically Signed by         Tone Walker MD  09/27/23 2025

## 2023-09-27 NOTE — TELEPHONE ENCOUNTER
Received a call from the patient that starting this morning she has been unable to keep any food or liquid down. She states that when she attempts to eat or drink, she develops mid chest dysphagia and subsequent regurgitation. She does not have substantial nausea associated with this. She recently completed a course of palliative radiation to a paraesophageal mass in her lower thoracic region, and we discussed that this may be secondary to inflammation from that treatment. I recommended that she proceed to the emergency department for evaluation including potential IVF and steroids in order to ensure she is staying hydrated and in order to reduce possible inflammation. We discussed that I would typically start with high dose dexamethasone (e.g. 4mg IV q12h) and titrate pending improvement. We additionally discussed that depending on clinical evaluation she may also require additional work in the form of imaging or endoscopic examination. I have asked she keep me informed regarding updates in her symptoms/status.      Darien Henry MD  Dept of Radiation Oncology

## 2023-09-29 ENCOUNTER — TELEPHONE (OUTPATIENT)
Dept: HEMATOLOGY ONCOLOGY | Facility: CLINIC | Age: 73
End: 2023-09-29

## 2023-09-29 DIAGNOSIS — C34.92 ADENOCARCINOMA OF LEFT LUNG, STAGE 4 (HCC): Primary | ICD-10-CM

## 2023-09-29 RX ORDER — SUCRALFATE 1 G/1
1 TABLET ORAL 3 TIMES DAILY
Qty: 42 TABLET | Refills: 0 | Status: SHIPPED | OUTPATIENT
Start: 2023-09-29 | End: 2023-10-13

## 2023-09-29 RX ORDER — DEXAMETHASONE 2 MG/1
2 TABLET ORAL 2 TIMES DAILY WITH MEALS
Qty: 10 TABLET | Refills: 0 | Status: SHIPPED | OUTPATIENT
Start: 2023-09-29 | End: 2023-10-04

## 2023-09-29 NOTE — TELEPHONE ENCOUNTER
Darwin Busch, this is [place] [place] [id number]. I am with Doctor Lane's team and the other day I was in the hospital because I couldn't keep anything down after my last radiation treatment. I'm still having the issue. However, I could not get ahold of Doctor Jassi Elliott this afternoon and I don't know quite what to do now. My phone number is [phone]. Thank you    Returned call to patient. She has some compazine at home and will try to take that in hopes of staying ahead of any issues.

## 2023-09-29 NOTE — TELEPHONE ENCOUNTER
Voicemail received:    Indigo Guzmán, this is [employer] [id number]. I am with Doctor Lane's team and the other day I was in the hospital because I couldn't keep anything down after my last radiation treatment. I'm still having the issue. However, I could not get ahold of Doctor Carlota Cope this afternoon and I don't know quite what to do now. My phone number is [phone].  Thank you

## 2023-10-04 ENCOUNTER — CLINICAL SUPPORT (OUTPATIENT)
Dept: PALLIATIVE MEDICINE | Facility: CLINIC | Age: 73
End: 2023-10-04

## 2023-10-04 DIAGNOSIS — Z71.89 COUNSELING AND COORDINATION OF CARE: Primary | ICD-10-CM

## 2023-10-04 PROCEDURE — NC001 PR NO CHARGE

## 2023-10-04 NOTE — PROGRESS NOTES
This note was not shared with the patient due to this is a psychotherapy note     Palliative Supportive Care  met with patient/family to continue to provide emotional support and guidance. Updated biopsychosocial information relevant to support: Following last week's visit patient was evaluated in the ED. She received IVF and steroids. She reports that she is starting to feel better overall. She is encouraged today that the worst is done. She was able to discuss with her brother the need to have a "what if plan."  Brother was agreeable to this (which was surprising to her). Patient and brother intend to start reaching out to agencies to explore options for in-home aide care. We did a little exploring of potential reasons that she doesn't cry. She is able to identify that being the caregiver for so many that she always felt the need to be strong for others. She is able to feel other emotions. Identified areas of need include:  Support and guidance to continue her current treatments. Her goal is to attend two graduations in 2024. Resources provided:  Provided patient with a list of local in home care providers. Areas that need future follow-up include:  Patient is going to start exploring some crafting options. Previously she enjoyed crafting but put it away for over a year.       I have spent 30 minutes with Patient  today in which greater than 50% of this time was spent in counseling/coordination of care     Palliative  will follow-up as requested by patient, family, and primary team.  Please contact with any specific requests

## 2023-10-10 ENCOUNTER — TELEPHONE (OUTPATIENT)
Dept: HEMATOLOGY ONCOLOGY | Facility: CLINIC | Age: 73
End: 2023-10-10

## 2023-10-10 DIAGNOSIS — C34.12 MALIGNANT NEOPLASM OF UPPER LOBE OF LEFT LUNG (HCC): Primary | ICD-10-CM

## 2023-10-10 DIAGNOSIS — E43 EDEMA DUE TO MALNUTRITION, DUE TO UNSPECIFIED MALNUTRITION TYPE (HCC): Primary | ICD-10-CM

## 2023-10-10 DIAGNOSIS — N18.4 STAGE 4 CHRONIC KIDNEY DISEASE (HCC): ICD-10-CM

## 2023-10-10 DIAGNOSIS — R60.9 FLUID RETENTION: ICD-10-CM

## 2023-10-10 DIAGNOSIS — Z51.5 PALLIATIVE CARE PATIENT: ICD-10-CM

## 2023-10-10 RX ORDER — FUROSEMIDE 20 MG/1
40 TABLET ORAL DAILY PRN
Qty: 60 TABLET | Refills: 0 | Status: SHIPPED | OUTPATIENT
Start: 2023-10-10

## 2023-10-10 NOTE — TELEPHONE ENCOUNTER
Patient Call    Who are you speaking with? self   If it is not the patient, are they listed on an active communication consent form? self   What is the reason for this call? Water retention   Does this require a call back? yes   If a call back is required, please list best call back number [phone]   If a call back is required, advise that a message will be forwarded to their care team and someone will return their call as soon as possible. Did you relay this information to the patient?  yes

## 2023-10-10 NOTE — TELEPHONE ENCOUNTER
Spoke with patient she reports B/L LE pitting edema for the last few days. Pt reports having an old prescription for Lasix 20 mg which she took the last two days without any improvement. Pt also has oral K 10 mEq at home to take with it. Pt denies and DVT related symptoms. Pt denies and SOB. Pt reports difficulty walking and putting shoes on due to the swelling. Pt just started elevating feet, cannot determine if improvement yet. Will review with Marcell Carcamo PA-C and call her back. Per Marcell Carcamo PA-C, double lasix to 40 mg daily repeat labs end of this week, okay to continue oral K. Pt called and notified. She was agreeable to this. Pt advised to call back by Thursday if no improvement and continue elevation of her legs. She was agreeable to this.

## 2023-10-12 ENCOUNTER — APPOINTMENT (OUTPATIENT)
Dept: LAB | Facility: AMBULARY SURGERY CENTER | Age: 73
End: 2023-10-12
Payer: MEDICARE

## 2023-10-12 DIAGNOSIS — R60.9 FLUID RETENTION: ICD-10-CM

## 2023-10-12 DIAGNOSIS — C34.12 MALIGNANT NEOPLASM OF UPPER LOBE OF LEFT LUNG (HCC): ICD-10-CM

## 2023-10-12 DIAGNOSIS — N18.4 STAGE 4 CHRONIC KIDNEY DISEASE (HCC): ICD-10-CM

## 2023-10-12 LAB
ALBUMIN SERPL BCP-MCNC: 3.6 G/DL (ref 3.5–5)
ALP SERPL-CCNC: 122 U/L (ref 34–104)
ALT SERPL W P-5'-P-CCNC: 9 U/L (ref 7–52)
ANION GAP SERPL CALCULATED.3IONS-SCNC: 8 MMOL/L
AST SERPL W P-5'-P-CCNC: 14 U/L (ref 13–39)
BASOPHILS # BLD AUTO: 0.05 THOUSANDS/ÂΜL (ref 0–0.1)
BASOPHILS NFR BLD AUTO: 1 % (ref 0–1)
BILIRUB SERPL-MCNC: 0.53 MG/DL (ref 0.2–1)
BUN SERPL-MCNC: 18 MG/DL (ref 5–25)
CALCIUM SERPL-MCNC: 9.8 MG/DL (ref 8.4–10.2)
CHLORIDE SERPL-SCNC: 99 MMOL/L (ref 96–108)
CO2 SERPL-SCNC: 31 MMOL/L (ref 21–32)
CREAT SERPL-MCNC: 1.42 MG/DL (ref 0.6–1.3)
EOSINOPHIL # BLD AUTO: 0.15 THOUSAND/ÂΜL (ref 0–0.61)
EOSINOPHIL NFR BLD AUTO: 2 % (ref 0–6)
ERYTHROCYTE [DISTWIDTH] IN BLOOD BY AUTOMATED COUNT: 16.5 % (ref 11.6–15.1)
GFR SERPL CREATININE-BSD FRML MDRD: 36 ML/MIN/1.73SQ M
GLUCOSE SERPL-MCNC: 182 MG/DL (ref 65–140)
HCT VFR BLD AUTO: 37.5 % (ref 34.8–46.1)
HGB BLD-MCNC: 11.3 G/DL (ref 11.5–15.4)
IMM GRANULOCYTES # BLD AUTO: 0.03 THOUSAND/UL (ref 0–0.2)
IMM GRANULOCYTES NFR BLD AUTO: 0 % (ref 0–2)
LYMPHOCYTES # BLD AUTO: 0.73 THOUSANDS/ÂΜL (ref 0.6–4.47)
LYMPHOCYTES NFR BLD AUTO: 9 % (ref 14–44)
MCH RBC QN AUTO: 25.7 PG (ref 26.8–34.3)
MCHC RBC AUTO-ENTMCNC: 30.1 G/DL (ref 31.4–37.4)
MCV RBC AUTO: 85 FL (ref 82–98)
MONOCYTES # BLD AUTO: 0.69 THOUSAND/ÂΜL (ref 0.17–1.22)
MONOCYTES NFR BLD AUTO: 8 % (ref 4–12)
NEUTROPHILS # BLD AUTO: 6.86 THOUSANDS/ÂΜL (ref 1.85–7.62)
NEUTS SEG NFR BLD AUTO: 80 % (ref 43–75)
NRBC BLD AUTO-RTO: 0 /100 WBCS
PLATELET # BLD AUTO: 298 THOUSANDS/UL (ref 149–390)
PMV BLD AUTO: 10.9 FL (ref 8.9–12.7)
POTASSIUM SERPL-SCNC: 4.3 MMOL/L (ref 3.5–5.3)
PROT SERPL-MCNC: 7 G/DL (ref 6.4–8.4)
RBC # BLD AUTO: 4.4 MILLION/UL (ref 3.81–5.12)
SODIUM SERPL-SCNC: 138 MMOL/L (ref 135–147)
WBC # BLD AUTO: 8.51 THOUSAND/UL (ref 4.31–10.16)

## 2023-10-12 PROCEDURE — 36415 COLL VENOUS BLD VENIPUNCTURE: CPT

## 2023-10-12 PROCEDURE — 85025 COMPLETE CBC W/AUTO DIFF WBC: CPT

## 2023-10-12 PROCEDURE — 80053 COMPREHEN METABOLIC PANEL: CPT

## 2023-10-12 NOTE — TELEPHONE ENCOUNTER
Pt returned my call, reports significant improvement in her leg swelling after three days. Discussed with Frederick Tan PA-C, she reports okay to use lasix PRN continue to monitor weight. Pt was agreeable to this and reports she will be getting her labs today.

## 2023-10-17 ENCOUNTER — TELEPHONE (OUTPATIENT)
Age: 73
End: 2023-10-17

## 2023-10-17 NOTE — TELEPHONE ENCOUNTER
Called patient. Counseled on potential issues re: prescribing medications for acute episodes such as this. She expressed concerns w/ prior benzodiazepine use ("psychotic episode" from lorazepam in 2016, inpatient). She expressed concern w/ new medication suggestion (trazodone). Will trial Benadryl, which she has at home; recommended a 25mg or 50mg dose. Message sent to [facility] for assistance. She has an appointment scheduled for [facility] tomorrow.

## 2023-10-17 NOTE — TELEPHONE ENCOUNTER
Kenneth Driscoll called stated that her brother is in the hospital and her anxiety is through the ruff. She would like for you to prescribe something for her. She asked if you can give her a call to discuss if needed. Please send to [facility] on [address]

## 2023-10-18 ENCOUNTER — CLINICAL SUPPORT (OUTPATIENT)
Dept: PALLIATIVE MEDICINE | Facility: CLINIC | Age: 73
End: 2023-10-18

## 2023-10-18 DIAGNOSIS — Z71.89 COUNSELING AND COORDINATION OF CARE: Primary | ICD-10-CM

## 2023-10-18 NOTE — PROGRESS NOTES
This note was not shared with the patient due to this is a psychotherapy note    Palliative Supportive Care  met with patient/family to continue to provide emotional support and guidance. Updated biopsychosocial information relevant to support:  Patient's brother is currently hospitalized and the patient is struggling with lots of anxiety related to this hospitalization. Patient has always been with others (caregiver for parents, children, spouse, and brother). She has never been alone. We discussed how she can utilize this time to develop her personal self growth/identity. She is able to identify that she is task oriented and a perfectionist.  We discussed creation of a personal schedule (not one for others) with the incorporation of small tasks towards a bigger goal.  We also discussed how previously distraction has assisted when her anxiety got overwhelming. Instead we talked about identifying the anxiety and creating multiple potential answers to the "what if's"    Identified areas of need include: She will start to explore things she is interested in:   - community organizations - the local [address] [place] [address] a new activity - maybe a language or cooking  - return to the 1966 graduation group  - volunteering for a agency/topic that she feels connected to   - return to [employer]    She did contact local private pay aide agencies to explore hiring an aide to allow her time to focus on herself and her growth. Brother is hesitant but patient is interested in this option    Resources provided: For her safety she should purchase a Life Alert. Areas that need future follow-up include:  continued support to explore her own identity outside of care giving.       I have spent 60 minutes with Patient  today in which greater than 50% of this time was spent in counseling/coordination of care     Palliative  will follow-up as requested by patient, family, and primary team. Please contact with any specific requests

## 2023-10-23 ENCOUNTER — PATIENT MESSAGE (OUTPATIENT)
Dept: FAMILY MEDICINE CLINIC | Facility: CLINIC | Age: 73
End: 2023-10-23

## 2023-10-23 DIAGNOSIS — M79.10 MYALGIA DUE TO STATIN: ICD-10-CM

## 2023-10-23 DIAGNOSIS — T46.6X5A MYALGIA DUE TO STATIN: ICD-10-CM

## 2023-10-24 DIAGNOSIS — K21.9 GERD WITHOUT ESOPHAGITIS: ICD-10-CM

## 2023-10-24 RX ORDER — OMEPRAZOLE 20 MG/1
20 CAPSULE, DELAYED RELEASE ORAL DAILY
Qty: 90 CAPSULE | Refills: 1 | Status: SHIPPED | OUTPATIENT
Start: 2023-10-24

## 2023-10-24 NOTE — TELEPHONE ENCOUNTER
Medication Refill Request     Name omeprazole  Dose/Frequency 20mg daily   Quantity 90  Verified pharmacy   [x]  Verified ordering Provider   [x]  Does patient have enough for the next 3 days?  Yes [] No []

## 2023-11-03 ENCOUNTER — TELEMEDICINE (OUTPATIENT)
Dept: RADIATION ONCOLOGY | Facility: HOSPITAL | Age: 73
End: 2023-11-03
Attending: STUDENT IN AN ORGANIZED HEALTH CARE EDUCATION/TRAINING PROGRAM

## 2023-11-03 DIAGNOSIS — C34.92 ADENOCARCINOMA OF LEFT LUNG, STAGE 4 (HCC): Primary | ICD-10-CM

## 2023-11-03 PROCEDURE — 99024 POSTOP FOLLOW-UP VISIT: CPT | Performed by: STUDENT IN AN ORGANIZED HEALTH CARE EDUCATION/TRAINING PROGRAM

## 2023-11-03 NOTE — PROGRESS NOTES
Follow-up - Radiation Oncology   Heather Griffith 1950 74 y.o. female [id number]      History of Present Illness   Cancer Staging   Adenocarcinoma of lung, stage 4 ([address])  Staging form: Lung, AJCC 7th Edition  - Clinical: T2 - Signed by Saranya Sewell MD on 6/8/2023  Stage prefix: Initial diagnosis  Laterality: Left  Source of metastatic specimen: Lung, Liver, Bone, Distant Lymph Nodes, Mediastinal Lymph Nodes  Histologic grade (G): G3  Lymph-vascular invasion (LVI): LVI not present (absent)/not identified  Residual tumor (R): RX - Cannot be assessed  Pleural/elastic layer invasion: Not assessed  Stage used in treatment planning: Yes  National guidelines used in treatment planning: Yes  Type of national guideline used in treatment planning: NCCN  - Pathologic: Stage IV (M1b) - Signed by Saranya Sewell MD on 6/8/2023  Biopsy of metastatic site performed: Yes  Source of metastatic specimen: Bone    Lung cancer [facility])  Staging form: Lung, AJCC 8th Edition  - Clinical: Stage IVB (cM1c) - Signed by Melinda Diaz MD on 8/17/2023    Ms. Barron Ambriz is a 74year old woman with Stage IV NSCLC s/p multiple prior courses of palliative RT including T9-11 to 3250cGy/13 fx in 12/2016. She thereafter presented with symptomatic oligo-progression of disease in paraspinal space anterior to previously irradiated region. On 9/26/23 she completed a course of high dose palliative re-RT to a dose of 4005cGy in 15 fractions; dose painting was used to treat the area of overlap with prior RT to 3750cGy/15 fractions. She returns today for continued follow-up. Interval History:  The patient was last seen in clinic at the completion of RT. She tolerated treatment well overall apart from dysphagia, which began towards the end of treatment and did result in a brief visit to the ED for IV steroids and IVFs. This resolved following her initial ED visit, but recurred several days thereafter.  Ultimately after some time this improved and at this point is completely resolved. Her pre-treatment back pain with particular foods (e.g. spicy foods) has improved. She has imaging arranged with Dr. Gene Hooker for the end of 12/2023; I will review this imaging when it is available. Historical Information   Oncology History   Adenocarcinoma of lung, stage 4 ([address])   10/18/2016 Initial Diagnosis    Adenocarcinoma of lung, stage 4 ([address])     10/18/2016 Biopsy    Final Diagnosis  A. Bone, T10, biopsy:     - Non-small cell carcinoma with features suggesting adenocarcinoma; see note.          11/7/2016 - 12/1/2016 Radiation    Course 1: T9 - T11 SPINE  13 fractions   Total Dose = 3,250 cGy     11/17/2016 Biopsy    Final Diagnosis  Lymph Node, Level 7: Conclusive evidence of Malignancy  Poorly differentiated non-small cell carcinoma      Lung, right main stem bronchus, biopsy:              - Poorly differentiated adenocarcinoma       12/15/2016 - 6/1/2017 Chemotherapy    Pembrolizumab 200 mg IV flat dose every 3 weeks      9/13/2017 Biopsy    Bone, left scapula, biopsy:  -  Positive for malignancy, consistent with metastatic adenocarcinoma          10/3/2017 - 10/16/2017 Radiation    palliative course of radiation therapy to the left scapular lesion to 3000 cGy( metastatic from adenocarcinoma of the lung)          4/10/2018 - 10/9/2018 Chemotherapy    nivolumab with prednisone 10 mg p.o     11/6/2018 -  Chemotherapy    11/6/18   Alimta 500 milligram/meter squared, carboplatin AUC 5 every 3 weeks,     11/6/2018 - 3/1/2021 Chemotherapy    cyanocobalamin injection 1,000 mcg, 1,000 mcg, Intramuscular, Once, 6 of 10 cycles  Administration: 1,000 mcg (1/17/2020), 1,000 mcg (6/10/2020), 1,000 mcg (10/14/2020), 1,000 mcg (1/5/2021)  fosaprepitant (EMEND) 150 mg in sodium chloride 0.9 % 250 mL IVPB, 150 mg, Intravenous, Once, 1 of 6 cycles  Administration: 150 mg (2/2/2021)  PEMEtrexed (ALIMTA) 1,020 mg in sodium chloride 0.9 % 100 mL chemo infusion, 500 mg/m2 = 1,020 mg, Intravenous, Once, 32 of 37 cycles  Dose modification: 400 mg/m2 (original dose 500 mg/m2, Cycle 23, Reason: Other (See Comments), Comment: decreasing crcl), 300 mg/m2 (original dose 500 mg/m2, Cycle 26, Reason: Treatment Parameters Not Met), 300 mg/m2 (original dose 500 mg/m2, Cycle 30, Reason: Max Dose Reached)  Administration: 1,020 mg (5/24/2019), 1,000 mg (6/14/2019), 1,000 mg (7/5/2019), 1,000 mg (8/2/2019), 1,000 mg (8/23/2019), 1,000 mg (9/13/2019), 1,000 mg (10/4/2019), 1,000 mg (10/25/2019), 1,000 mg (11/15/2019), 1,000 mg (12/6/2019), 1,000 mg (12/27/2019), 1,000 mg (1/17/2020), 1,000 mg (2/7/2020), 800 mg (5/22/2020), 800 mg (6/10/2020), 800 mg (7/1/2020), 600 mg (7/29/2020), 800 mg (8/19/2020), 600 mg (11/11/2020), 600 mg (1/5/2021), 600 mg (2/2/2021), 600 mg (10/14/2020)     11/12/2018 - 11/26/2018 Radiation    Course: C3: Left Scapula retreat  10 fractions  2,000 cGy         4/5/2021 - 4/21/2021 Radiation    Course: C4 SBRT    Plan ID Energy Fractions Dose per Fraction (cGy) Dose Correction (cGy) Total Dose Delivered (cGy) Elapsed Days   SBRT LtLung 6X 8 / 8 750 0 6,000 16      Dr. Anaya Marchi 8/2/2021 - 9/24/2021 Chemotherapy    cyanocobalamin, 1,000 mcg, Intramuscular, Once, 2 of 3 cycles  Administration: 1,000 mcg (7/19/2021)  fosaprepitant (EMEND) IVPB, 150 mg, Intravenous, Once, 3 of 6 cycles  Administration: 150 mg (8/2/2021), 150 mg (9/24/2021), 150 mg (8/23/2021)  nivolumab (OPDIVO) IVPB, 240 mg (100 % of original dose 240 mg), Intravenous, Once, 3 of 6 cycles  Dose modification: 240 mg (original dose 240 mg, Cycle 1)  Administration: 240 mg (8/2/2021), 240 mg (8/23/2021), 240 mg (9/24/2021)  CARBOplatin (PARAPLATIN) IVPB ([facility] AUC DOSING), 275 mg, Intravenous, Once, 3 of 6 cycles  Dose modification: 304.5 mg (original dose 279.5 mg, Cycle 3, Reason: Other (Must fill in a comment), Comment:  to sign order),   (original dose 279.5 mg, Cycle 3, Reason: Treatment Parameters Not Met)  Administration: 275 mg (8/2/2021), 273.5 mg (9/24/2021), 304.5 mg (8/23/2021)  pemetrexed (ALIMTA) chemo infusion, 490 mg (50 % of original dose 500 mg/m2), Intravenous, Once, 3 of 6 cycles  Dose modification: 250 mg/m2 (original dose 500 mg/m2, Cycle 1, Reason: Anticipated Tolerance)  Administration: 500 mg (8/2/2021), 490 mg (8/23/2021)     10/15/2021 - 12/17/2021 Chemotherapy    pegfilgrastim (NEULASTA ONPRO), 6 mg, Subcutaneous, Once, 4 of 8 cycles  Administration: 6 mg (10/15/2021), 6 mg (11/5/2021), 6 mg (11/26/2021), 6 mg (12/17/2021)  fosaprepitant (EMEND) IVPB, 150 mg, Intravenous, Once, 4 of 8 cycles  Administration: 150 mg (10/15/2021), 150 mg (11/5/2021), 150 mg (11/26/2021), 150 mg (12/17/2021)  ramucirumab (CYRAMZA) IVPB, 10 mg/kg = 866 mg, Intravenous, Once, 3 of 3 cycles  Administration: 866 mg (11/5/2021), 900 mg (11/26/2021), 800 mg (12/17/2021)  DOCEtaxel (TAXOTERE) chemo infusion, 75 mg/m2 = 144 mg, Intravenous, Once, 4 of 8 cycles  Dose modification: 60 mg/m2 (original dose 75 mg/m2, Cycle 5, Reason: Anticipated Tolerance)  Administration: 144 mg (10/15/2021), 144 mg (11/5/2021), 144 mg (11/26/2021), 138 mg (12/17/2021)     2/21/2022 - 9/6/2022 Chemotherapy    vinORELBine (NAVELBINE) IVPB, 25 mg/m2 = 47 mg (83.3 % of original dose 30 mg/m2), Intravenous, Once, 7 of 10 cycles  Dose modification: 25 mg/m2 (original dose 30 mg/m2, Cycle 1, Reason: Dose modified as per discussion with consulting physician)  Administration: 47 mg (2/21/2022), 47 mg (3/7/2022), 47 mg (3/21/2022), 47 mg (4/4/2022), 47 mg (4/18/2022), 47 mg (5/2/2022), 47 mg (5/16/2022), 47 mg (5/31/2022), 47 mg (6/20/2022), 47 mg (7/5/2022), 47 mg (7/25/2022), 47 mg (8/8/2022), 47 mg (8/22/2022), 47 mg (9/6/2022)     6/8/2023 -  Cancer Staged    Staging form: Lung, AJCC 7th Edition  - Clinical: T2 - Signed by Saranya Sewell MD on 6/8/2023  Stage prefix: Initial diagnosis  Laterality: Left  Source of metastatic specimen: Lung, Liver, Bone, Distant Lymph Nodes, Mediastinal Lymph Nodes  Histologic grade (G): G3  Lymph-vascular invasion (LVI): LVI not present (absent)/not identified  Residual tumor (R): RX - Cannot be assessed  Pleural/elastic layer invasion: Not assessed  Stage used in treatment planning: Yes  National guidelines used in treatment planning: Yes  Type of national guideline used in treatment planning: NCCN       6/8/2023 -  Cancer Staged    Staging form: Lung, AJCC 7th Edition  - Pathologic: Stage IV (M1b) - Signed by Sue Pedro MD on 6/8/2023  Biopsy of metastatic site performed: Yes  Source of metastatic specimen: Bone       9/6/2023 - 9/26/2023 Radiation    The patient saw @[facility] for radiation treatment.  This is the current list of radiation treatment:  Plan ID Energy Fractions Dose per Fraction (cGy) Dose Correction (cGy) Total Dose Delivered (cGy) Elapsed Days   R Paraspinal 6X 15 / 15 267 0 4,005 20      Malignant neoplasm of unspecified part of right bronchus or lung ([address]) (Resolved)   5/24/2017 Initial Diagnosis    Malignant neoplasm of unspecified part of right bronchus or lung ([facility]) (Resolved)     4/5/2021 - 4/21/2021 Radiation    Course: C4 SBRT    Plan ID Energy Fractions Dose per Fraction (cGy) Dose Correction (cGy) Total Dose Delivered (cGy) Elapsed Days   SBRT LtLung 6X 8 / 8 750 0 6,000 16      Dr. Bar Helms     Malignant neoplasm of bone with metastases ([address])   5/24/2017 Initial Diagnosis    Malignant neoplasm of bone with metastases ([address])     4/5/2021 - 4/21/2021 Radiation    Course: C4 SBRT    Plan ID Energy Fractions Dose per Fraction (cGy) Dose Correction (cGy) Total Dose Delivered (cGy) Elapsed Days   SBRT LtLung 6X 8 / 8 750 0 6,000 16      Dr. Bar Helms     10/15/2021 - 12/17/2021 Chemotherapy    pegfilgrastim ([place]), 6 mg, Subcutaneous, Once, 4 of 8 cycles  Administration: 6 mg (10/15/2021), 6 mg (11/5/2021), 6 mg (11/26/2021), 6 mg (12/17/2021)  fosaprepitant (EMEND) IVPB, 150 mg, Intravenous, Once, 4 of 8 cycles  Administration: 150 mg (10/15/2021), 150 mg (11/5/2021), 150 mg (11/26/2021), 150 mg (12/17/2021)  ramucirumab (CYRAMZA) IVPB, 10 mg/kg = 866 mg, Intravenous, Once, 3 of 3 cycles  Administration: 866 mg (11/5/2021), 900 mg (11/26/2021), 800 mg (12/17/2021)  DOCEtaxel (TAXOTERE) chemo infusion, 75 mg/m2 = 144 mg, Intravenous, Once, 4 of 8 cycles  Dose modification: 60 mg/m2 (original dose 75 mg/m2, Cycle 5, Reason: Anticipated Tolerance)  Administration: 144 mg (10/15/2021), 144 mg (11/5/2021), 144 mg (11/26/2021), 138 mg (12/17/2021)     2/21/2022 - 9/6/2022 Chemotherapy    vinORELBine (NAVELBINE) IVPB, 25 mg/m2 = 47 mg (83.3 % of original dose 30 mg/m2), Intravenous, Once, 7 of 10 cycles  Dose modification: 25 mg/m2 (original dose 30 mg/m2, Cycle 1, Reason: Dose modified as per discussion with consulting physician)  Administration: 47 mg (2/21/2022), 47 mg (3/7/2022), 47 mg (3/21/2022), 47 mg (4/4/2022), 47 mg (4/18/2022), 47 mg (5/2/2022), 47 mg (5/16/2022), 47 mg (5/31/2022), 47 mg (6/20/2022), 47 mg (7/5/2022), 47 mg (7/25/2022), 47 mg (8/8/2022), 47 mg (8/22/2022), 47 mg (9/6/2022)     Lung cancer ([address])   1/3/2022 Initial Diagnosis    Lung cancer ([address])     2/21/2022 - 9/6/2022 Chemotherapy    vinORELBine (NAVELBINE) IVPB, 25 mg/m2 = 47 mg (83.3 % of original dose 30 mg/m2), Intravenous, Once, 7 of 10 cycles  Dose modification: 25 mg/m2 (original dose 30 mg/m2, Cycle 1, Reason: Dose modified as per discussion with consulting physician)  Administration: 47 mg (2/21/2022), 47 mg (3/7/2022), 47 mg (3/21/2022), 47 mg (4/4/2022), 47 mg (4/18/2022), 47 mg (5/2/2022), 47 mg (5/16/2022), 47 mg (5/31/2022), 47 mg (6/20/2022), 47 mg (7/5/2022), 47 mg (7/25/2022), 47 mg (8/8/2022), 47 mg (8/22/2022), 47 mg (9/6/2022)      Chemotherapy    cyanocobalamin, 1,000 mcg, Intramuscular, Once, 6 of 10 cycles    Administration: 1,000 mcg (1/17/2020), 1,000 mcg (6/10/2020), 1,000 mcg (10/14/2020), 1,000 mcg (1/5/2021)        fosaprepitant (EMEND) IVPB, 150 mg, Intravenous, Once, 1 of 6 cycles    Administration: 150 mg (2/2/2021)        pemetrexed (ALIMTA) chemo infusion, 500 mg/m2 = 1,020 mg, Intravenous, Once, 32 of 37 cycles    Dose modification: 400 mg/m2 (original dose 500 mg/m2, Cycle 23, Reason: Other (Must fill in a comment), Comment: decreasing crcl), 300 mg/m2 (original dose 500 mg/m2, Cycle 26, Reason: Treatment Parameters Not Met), 300 mg/m2 (original dose 500 mg/m2, Cycle 30, Reason: Max Dose Reached)    Administration: 1,020 mg (5/24/2019), 1,000 mg (6/14/2019), 1,000 mg (7/5/2019), 1,000 mg (8/2/2019), 1,000 mg (8/23/2019), 1,000 mg (9/13/2019), 1,000 mg (10/4/2019), 1,000 mg (10/25/2019), 1,000 mg (11/15/2019), 1,000 mg (12/6/2019), 1,000 mg (12/27/2019), 1,000 mg (1/17/2020), 1,000 mg (2/7/2020), 800 mg (5/22/2020), 800 mg (6/10/2020), 800 mg (7/1/2020), 600 mg (7/29/2020), 800 mg (8/19/2020), 600 mg (11/11/2020), 600 mg (1/5/2021), 600 mg (2/2/2021), 600 mg (10/14/2020)    cyanocobalamin, 1,000 mcg, Intramuscular, Once, 2 of 3 cycles    Administration: 1,000 mcg (7/19/2021)        fosaprepitant (EMEND) IVPB, 150 mg, Intravenous, Once, 3 of 6 cycles    Administration: 150 mg (8/2/2021), 150 mg (9/24/2021), 150 mg (8/23/2021)        nivolumab (OPDIVO) IVPB, 240 mg (100 % of original dose 240 mg), Intravenous, Once, 3 of 6 cycles    Dose modification: 240 mg (original dose 240 mg, Cycle 1)    Administration: 240 mg (8/2/2021), 240 mg (8/23/2021), 240 mg (9/24/2021)        CARBOplatin (PARAPLATIN) IVPB ([facility] AUC DOSING), 275 mg, Intravenous, Once, 3 of 6 cycles    Dose modification: 304.5 mg (original dose 279.5 mg, Cycle 3, Reason: Other (Must fill in a comment), Comment:  to sign order),   (original dose 279.5 mg, Cycle 3, Reason: Treatment Parameters Not Met)    Administration: 275 mg (8/2/2021), 273.5 mg (9/24/2021), 304.5 mg (8/23/2021)        pemetrexed (ALIMTA) chemo infusion, 490 mg (50 % of original dose 500 mg/m2), Intravenous, Once, 3 of 6 cycles    Dose modification: 250 mg/m2 (original dose 500 mg/m2, Cycle 1, Reason: Anticipated Tolerance)    Administration: 500 mg (8/2/2021), 490 mg (8/23/2021)    pegfilgrastim (NEULASTA ONPRO), 6 mg, Subcutaneous, Once, 4 of 8 cycles    Administration: 6 mg (10/15/2021), 6 mg (11/5/2021), 6 mg (11/26/2021), 6 mg (12/17/2021)        fosaprepitant (EMEND) IVPB, 150 mg, Intravenous, Once, 4 of 8 cycles    Administration: 150 mg (10/15/2021), 150 mg (11/5/2021), 150 mg (11/26/2021), 150 mg (12/17/2021)        ramucirumab (CYRAMZA) IVPB, 10 mg/kg = 866 mg, Intravenous, Once, 3 of 3 cycles    Administration: 866 mg (11/5/2021), 900 mg (11/26/2021), 800 mg (12/17/2021)        DOCEtaxel (TAXOTERE) chemo infusion, 75 mg/m2 = 144 mg, Intravenous, Once, 4 of 8 cycles    Dose modification: 60 mg/m2 (original dose 75 mg/m2, Cycle 5, Reason: Anticipated Tolerance)    Administration: 144 mg (10/15/2021), 144 mg (11/5/2021), 144 mg (11/26/2021), 138 mg (12/17/2021)    vinORELBine (NAVELBINE) IVPB, 25 mg/m2 = 47 mg (83.3 % of original dose 30 mg/m2), Intravenous, Once, 7 of 10 cycles    Dose modification: 25 mg/m2 (original dose 30 mg/m2, Cycle 1, Reason: Dose modified as per discussion with consulting physician)    Administration: 47 mg (2/21/2022), 47 mg (3/7/2022), 47 mg (3/21/2022), 47 mg (4/4/2022), 47 mg (4/18/2022), 47 mg (5/2/2022), 47 mg (5/16/2022), 47 mg (5/31/2022), 47 mg (6/20/2022), 47 mg (7/5/2022), 47 mg (7/25/2022), 47 mg (8/8/2022), 47 mg (8/22/2022), 47 mg (9/6/2022)       8/17/2023 -  Cancer Staged    Staging form: Lung, AJCC 8th Edition  - Clinical: Stage IVB (cM1c) - Signed by Addie Reyes MD on 8/17/2023             OBJECTIVE:   LMP  (LMP Unknown)       Assessment/Plan:  No orders of the defined types were placed in this encounter.      Approximately 6 weeks following completion of palliative reirradiation, the patient is doing well overall and has recovered from her prior RT related symptoms. She has additionally had improvement in her pretreatment discomfort with specific foods, suggesting a good response to therapy. She has restarted medical therapy with sotorasib under the care of Dr. Marisa Saucedo. She will undergo PET/CT in 12/2023 and will follow-up with Dr. Marisa Saucedo thereafter. I have recommended she remain on omeprazole and will remain available to see her back should the need arise. Rodger Landis MD  11/3/2023,2:35 PM    Portions of the record may have been created with voice recognition software. Occasional wrong word or "sound a like" substitutions may have occurred due to the inherent limitations of voice recognition software. Read the chart carefully and recognize, using context, where substitutions have occurred.

## 2023-11-06 ENCOUNTER — OFFICE VISIT (OUTPATIENT)
Dept: PALLIATIVE MEDICINE | Facility: CLINIC | Age: 73
End: 2023-11-06
Payer: MEDICARE

## 2023-11-06 VITALS
DIASTOLIC BLOOD PRESSURE: 60 MMHG | HEIGHT: 62 IN | TEMPERATURE: 96.8 F | BODY MASS INDEX: 31.47 KG/M2 | OXYGEN SATURATION: 99 % | HEART RATE: 68 BPM | WEIGHT: 171 LBS | SYSTOLIC BLOOD PRESSURE: 120 MMHG

## 2023-11-06 DIAGNOSIS — Z51.5 PALLIATIVE CARE PATIENT: ICD-10-CM

## 2023-11-06 DIAGNOSIS — F41.9 ANXIETY: ICD-10-CM

## 2023-11-06 DIAGNOSIS — K63.9 BOWEL TROUBLE: ICD-10-CM

## 2023-11-06 DIAGNOSIS — C34.92 ADENOCARCINOMA OF LEFT LUNG, STAGE 4 (HCC): Primary | ICD-10-CM

## 2023-11-06 DIAGNOSIS — K21.9 GASTROESOPHAGEAL REFLUX DISEASE WITHOUT ESOPHAGITIS: ICD-10-CM

## 2023-11-06 DIAGNOSIS — F32.5 MAJOR DEPRESSIVE DISORDER WITH SINGLE EPISODE, IN FULL REMISSION (HCC): ICD-10-CM

## 2023-11-06 DIAGNOSIS — M25.50 JOINT PAIN: ICD-10-CM

## 2023-11-06 PROCEDURE — 99213 OFFICE O/P EST LOW 20 MIN: CPT | Performed by: INTERNAL MEDICINE

## 2023-11-06 NOTE — PATIENT INSTRUCTIONS
It was good to see you today. Thank you for coming in. Call the main Palliative office to reschedule your psychotherapy appointment from 11/1/23. [phone]. Continue current medications. Return in about 6 weeks (around 12/18/2023). Call us for refills on medications that we supply, as needed. If something changes and you need to come in sooner, please call our office. PRESCRIPTION REFILL REMINDER:  All medication refills should be requested prior to [facility] on Friday. Any refill requests after noon on Friday would be addressed the following Monday. MEDICATION SAFETY ISSUES:  Do not drive under the influence of narcotics (including opioids), watch for adverse effects including confusion / altered mental status / respiratory depression (slowed breathing), keep medications stored in a safe/locked environment, do not use alcohol while opioids or other narcotics are in your system.

## 2023-11-06 NOTE — PROGRESS NOTES
Follow-up with Palliative and Supportive Care  Will Born Hero 74 y.o. female [id number]    ASSESSMENT & PLAN:  1. Adenocarcinoma of left lung, stage 4 ([address] )    2. Major depressive disorder with single episode, in full remission ([address])    3. Anxiety    4. Bowel trouble    5. Joint pain    6. Gastroesophageal reflux disease without esophagitis    7. Palliative care patient          Patient had an anxiety flare recently as her brother was ill and in the hospital. Mood has normalized though she has some reasonable. She feels her current regimen is effective and requests no changes. Benadryl PRN has been effective for insomnia. Continue buspirone 7.5mg QHS. Of note, sotorazib lowers serum concentrations of buspirone. Continue venlafaxine at 75mg QHS. Patient reports [facility] psychotherapy services have been helpful; continue. She needs to reschedule her 11/1/23 appointment. Continue omeprazole for reflux. Patient states her chronic polyarthralgia is well-managed w/o needing recent doses of oxyIR. Some of her chronic pain may be cancer-related. Continue Tylenol PRN. No interactions between sotorasib and Tylenol. Patient may use oxyIR for chronic pain; she has not needed this recently. Continue gabapentin at 200mg q12h. Effective, tolerated well. Counseled today on bowel regimen for intermittent diarrhea. Continue disease-directed cares. ACP: Patient has completed advanced directives (POLST and Power of ROSARIO GONZALEZ). In EMR. Reviewed notes (ED, Medical Oncology, Radiation Oncology), labs (10/12/23 Cr 1.42, eGFR 36, , alb 3.6, Hb 11.3; 9/27/23 alb 3.4, Cr 1.06, eGFR 51, , Hb 10.2), imaging (7/28/23 PET CT). Return in about 6 weeks (around 12/18/2023). Emotional support provided.   Medication safety issues addressed - no driving under the influence of narcotics (including opioids), watch for adverse effects including AMS or respiratory depression (slowed breathing), keep medications stored in a safe/locked environment, do not use alcohol while opioids or other narcotics are in one's system. Requested Prescriptions      No prescriptions requested or ordered in this encounter       There are no discontinued medications. Representatives have queried the patient's controlled substance dispensing history in the Prescription Drug Monitoring Program in compliance with regulations before I have prescribed any controlled substances. The prescription history is consistent with prescribed therapy and our practice policies. 20+ minutes were spent in this ambulatory visit with greater than 50% of the time spent face to face with patient in counseling or coordination of care including symptom assessment and management, medication review, psychosocial support, chart review, imaging review, lab review, supportive listening, and anticipatory guidance. All of the patient's questions were answered during this discussion. SUBJECTIVE:  Chief Complaint   Patient presents with    Follow-up    Cancer    Anxiety    Depression    Counseling    Pain        HPI    Noreen Ca is a 74 y.o. female w/ stage IV non-small cell lung cancer (diagnosed 2016) w/ osseous metastasis s/p chemotherapy + immunotherapy + RT, DM2, HFpEF, Afib, CKD, HTN+HLD, h/o CVA. She follows w/ Rodrigo Gonzalez (Medical Oncology), Dr Leander Julio (Radiation Oncology), [address] [place] [address] Specialists, Dr Lynn Connelly (Cardiology), Dr Melvin Chacon (Neurology). Plan includes systemic therapy w/ sotorasib. Patient is known to [facility] clinic; seen 8/14/23 for symptom assessment and management, medication review, psychosocial support, chart review, imaging review, lab review, supportive listening and anticipatory guidance. Seen in the ED 9/27/23 for N+V. Patient reports she is happy that her brother is now back from hospital. She had increased anxiety due to her concerns about his health and his future.  She feels her mood has normalized, and is "good, considering". She feels her current mood regimen is sufficient for her needs, though she will take Benadryl QHS on occasion to help insomnia. She is noting no side effects this medication, no xerostomia, no new changes in her gait. She feels [facility] psychotherapy support has been quite helpful, and she wishes to continue this. She endorses some caregiver stress. For the most part, she and her cousin share responsibility assisting her brother Vazquez Freitas. Patient endorses occasional joint pain, which she feels is well managed on her current regimen of Tylenol, gabapentin. She has not needed to use oxycodone recently. She feels her sleep is adequate. She has been regaining some lost weight recently. She denies recent nausea. She has occasional mild diarrhea for which Imodium is helpful; no recent constipation. Reflux is managed. PDMP shows no concerns. Review of Systems   All other systems reviewed and are negative. The following portions of the medical history were reviewed: past medical history, surgical history, problem list, medication list, family history, and social history.       Current Outpatient Medications:     acetaminophen (TYLENOL) 500 mg tablet, Take 1-2 tablets (500-1,000 mg total) by mouth 3 (three) times a day as needed for mild pain, headaches or fever, Disp: 540 tablet, Rfl: 3    apixaban (Eliquis) 5 mg, TAKE 1 TABLET TWICE A DAY, Disp: 180 tablet, Rfl: 3    aspirin (ECOTRIN LOW STRENGTH) 81 mg EC tablet, Take 1 tablet (81 mg total) by mouth daily, Disp: 30 tablet, Rfl: 0    atorvastatin (LIPITOR) 40 mg tablet, TAKE 1 TABLET BY MOUTH EVERY DAY, Disp: 90 tablet, Rfl: 1    busPIRone (BUSPAR) 7.5 mg tablet, Take 1 tablet (7.5 mg total) by mouth daily at bedtime, Disp: 90 tablet, Rfl: 0    cyanocobalamin (VITAMIN B-12) 100 mcg tablet, Take by mouth daily, Disp: , Rfl:     folic acid (FOLVITE) 1 mg tablet, Take 1 tablet (1 mg total) by mouth daily, Disp: 90 tablet, Rfl: 1    furosemide (LASIX) 20 mg tablet, Take 2 tablets (40 mg total) by mouth daily as needed (for lower leg swelling), Disp: 60 tablet, Rfl: 0    gabapentin (Neurontin) 100 mg capsule, Take 2 capsules (200 mg total) by mouth 2 (two) times a day, Disp: 360 capsule, Rfl: 0    linaGLIPtin (Tradjenta) 5 MG TABS, Take 5 mg by mouth daily, Disp: 90 tablet, Rfl: 3    loperamide (IMODIUM) 2 mg capsule, Take 2 mg by mouth 4 (four) times a day as needed for diarrhea, Disp: , Rfl:     loratadine (CLARITIN) 10 mg tablet, Take 10 mg by mouth as needed, Disp: , Rfl:     metFORMIN (GLUCOPHAGE) 500 mg tablet, Take 1 tablet (500 mg total) by mouth 2 (two) times a day with meals, Disp: 180 tablet, Rfl: 0    metoprolol tartrate (LOPRESSOR) 25 mg tablet, Take 1 tablet (25 mg total) by mouth every 12 (twelve) hours, Disp: 180 tablet, Rfl: 3    omeprazole (PriLOSEC) 20 mg delayed release capsule, Take 1 capsule (20 mg total) by mouth daily, Disp: 90 capsule, Rfl: 1    oxyCODONE (OXY-IR) 5 MG capsule, Take 1 capsule (5 mg total) by mouth every 4 (four) hours as needed for moderate pain or severe pain Max Daily Amount: 30 mg, Disp: 60 capsule, Rfl: 0    potassium chloride (Klor-Con M10) 10 mEq tablet, Take 1 tablet (10 mEq total) by mouth daily, Disp: 90 tablet, Rfl: 3    sotalol (BETAPACE) 80 mg tablet, Take 1 tablet (80 mg total) by mouth 2 (two) times a day, Disp: 180 tablet, Rfl: 3    Sotorasib 120 MG TABS, Take 960 mg by mouth daily, Disp: 240 tablet, Rfl: 0    valsartan (DIOVAN) 160 mg tablet, TAKE 1 TABLET BY MOUTH TWICE A DAY, Disp: 180 tablet, Rfl: 3    venlafaxine (EFFEXOR) 37.5 mg tablet, Take 2 tablets (75 mg total) by mouth daily at bedtime, Disp: 180 tablet, Rfl: 0    LOSARTAN POTASSIUM PO, Take by mouth (Patient not taking: Reported on 8/17/2023), Disp: , Rfl:     sucralfate (CARAFATE) 1 g tablet, Take 1 tablet (1 g total) by mouth 3 (three) times a day for 14 days Crush and mix with 1/4 cup of water.  Drink., Disp: 42 tablet, Rfl: 0    OBJECTIVE:  /60 (BP Location: Left arm, Patient Position: Sitting, Cuff Size: Standard)   Pulse 68   Temp (!) 96.8 °F (36 °C) (Temporal)   Ht 5' 2" (1.575 m)   Wt 77.6 kg (171 lb)   LMP  (LMP Unknown)   SpO2 99%   BMI 31.28 kg/m²   Physical Exam  Vitals reviewed. Constitutional:       General: She is not in acute distress. Appearance: She is well-groomed and overweight. She is not toxic-appearing. HENT:      Head: Normocephalic and atraumatic. Right Ear: External ear normal.      Left Ear: External ear normal.   Eyes:      General: No scleral icterus. Right eye: No discharge. Left eye: No discharge. Extraocular Movements: Extraocular movements intact. Conjunctiva/sclera: Conjunctivae normal.      Pupils: Pupils are equal, round, and reactive to light. Cardiovascular:      Rate and Rhythm: Normal rate. Pulmonary:      Effort: Pulmonary effort is normal. No tachypnea, bradypnea, accessory muscle usage or respiratory distress. Comments: Able to speak comfortably in complete sentences on room air at rest.  Abdominal:      General: There is no distension. Tenderness: There is no guarding. Musculoskeletal:      Cervical back: Normal range of motion. Right lower leg: No edema. Left lower leg: No edema. Skin:     General: Skin is dry. Coloration: Skin is not pale. Neurological:      Mental Status: She is alert and oriented to person, place, and time. Cranial Nerves: No dysarthria or facial asymmetry. Gait: Gait normal.      Comments: Using no assistive devices for ambulation. Psychiatric:         Attention and Perception: Attention normal.         Mood and Affect: Mood and affect normal.         Speech: Speech normal.         Behavior: Behavior normal. Behavior is cooperative. Thought Content:  Thought content normal.         Cognition and Memory: Cognition and memory normal.         Judgment: Judgment normal. Celso Byrd MD  [facility]  [phone]    Portions of this document may have been created using dictation software and as such some "sound alike" terms may have been generated by the system. Do not hesitate to contact me with any questions or clarifications.

## 2023-11-14 ENCOUNTER — OFFICE VISIT (OUTPATIENT)
Dept: FAMILY MEDICINE CLINIC | Facility: CLINIC | Age: 73
End: 2023-11-14
Payer: MEDICARE

## 2023-11-14 VITALS
TEMPERATURE: 95.6 F | HEIGHT: 62 IN | SYSTOLIC BLOOD PRESSURE: 126 MMHG | RESPIRATION RATE: 17 BRPM | BODY MASS INDEX: 31.5 KG/M2 | OXYGEN SATURATION: 95 % | DIASTOLIC BLOOD PRESSURE: 70 MMHG | WEIGHT: 171.2 LBS | HEART RATE: 63 BPM

## 2023-11-14 DIAGNOSIS — I48.0 PAROXYSMAL ATRIAL FIBRILLATION (HCC): ICD-10-CM

## 2023-11-14 DIAGNOSIS — E11.22 TYPE 2 DIABETES MELLITUS WITH STAGE 3 CHRONIC KIDNEY DISEASE, WITHOUT LONG-TERM CURRENT USE OF INSULIN, UNSPECIFIED WHETHER STAGE 3A OR 3B CKD (HCC): Primary | ICD-10-CM

## 2023-11-14 DIAGNOSIS — R26.9 GAIT ABNORMALITY: ICD-10-CM

## 2023-11-14 DIAGNOSIS — N18.30 TYPE 2 DIABETES MELLITUS WITH STAGE 3 CHRONIC KIDNEY DISEASE, WITHOUT LONG-TERM CURRENT USE OF INSULIN, UNSPECIFIED WHETHER STAGE 3A OR 3B CKD (HCC): Primary | ICD-10-CM

## 2023-11-14 DIAGNOSIS — Z23 ENCOUNTER FOR IMMUNIZATION: ICD-10-CM

## 2023-11-14 DIAGNOSIS — I10 PRIMARY HYPERTENSION: ICD-10-CM

## 2023-11-14 DIAGNOSIS — E78.2 MIXED HYPERLIPIDEMIA: ICD-10-CM

## 2023-11-14 PROCEDURE — G0008 ADMIN INFLUENZA VIRUS VAC: HCPCS

## 2023-11-14 PROCEDURE — 99214 OFFICE O/P EST MOD 30 MIN: CPT | Performed by: FAMILY MEDICINE

## 2023-11-14 PROCEDURE — 90662 IIV NO PRSV INCREASED AG IM: CPT

## 2023-11-14 NOTE — PROGRESS NOTES
Diabetic Foot Exam    Patient's shoes and socks removed. Right Foot/Ankle   Right Foot Inspection  Skin Exam: dry skin. Toe Exam: swelling. Sensory   Monofilament testing: intact    Vascular  Capillary refills: < 3 seconds  The right DP pulse is 2+. The right PT pulse is 2+. Left Foot/Ankle  Left Foot Inspection  Skin Exam: dry skin. Toe Exam: swelling. Sensory   Monofilament testing: intact    Vascular  Capillary refills: < 3 seconds  The left DP pulse is 1+. The left PT pulse is 1+. Assign Risk Category  No deformity present  No loss of protective sensation  Weak pulses  Risk: 0  Assessment/Plan:    1. Type 2 diabetes mellitus with stage 3 chronic kidney disease, without long-term current use of insulin, unspecified whether stage 3a or 3b CKD ([address])  Assessment & Plan:  Stable on current meds  Lab Results   Component Value Date    HGBA1C 7.2 (H) 08/31/2023       Orders:  -     Hemoglobin A1C; Future; Expected date: 11/14/2023  -     Albumin / creatinine urine ratio; Future; Expected date: 11/14/2023    2. Primary hypertension  Assessment & Plan:  Stable on current meds      3. Paroxysmal atrial fibrillation (HCC)  Assessment & Plan:  Stable on eliquis      4. Gait abnormality  -     Ambulatory Referral to Physical Therapy; Future    5. Mixed hyperlipidemia  -     Lipid Panel with Direct LDL reflex; Future; Expected date: 11/14/2023  -     TSH, 3rd generation with Free T4 reflex; Future; Expected date: 11/14/2023    6. Encounter for immunization  -     influenza vaccine, high-dose, PF 0.7 mL (FLUZONE HIGH-DOSE)            There are no Patient Instructions on file for this visit. Return in about 6 months (around 5/14/2024) for Annual physical.    Subjective:      Patient ID: Alexandria Ramsey is a 74 y.o. female. Chief Complaint   Patient presents with   • Follow-up     Pt is here for 5 months follow up.        Here for follow up  Had radiation   Getting CT  Flu shot today    Hypertension  This is a chronic problem. The current episode started more than 1 year ago. The problem is unchanged. The problem is controlled. There are no associated agents to hypertension. Risk factors for coronary artery disease include diabetes mellitus and dyslipidemia. Past treatments include angiotensin blockers. The current treatment provides significant improvement. There are no compliance problems. Hyperlipidemia  This is a chronic problem. The current episode started more than 1 year ago. The problem is controlled. Recent lipid tests were reviewed and are normal. There are no known factors aggravating her hyperlipidemia. Current antihyperlipidemic treatment includes statins. The current treatment provides significant improvement of lipids. There are no compliance problems. Diabetes  She presents for her follow-up diabetic visit. She has type 2 diabetes mellitus. Her disease course has been worsening. There are no hypoglycemic associated symptoms. Associated symptoms include fatigue. Symptoms are worsening. Risk factors for coronary artery disease include diabetes mellitus, dyslipidemia and hypertension. She is following a diabetic diet. She has not had a previous visit with a dietitian. She rarely participates in exercise. An ACE inhibitor/angiotensin II receptor blocker is being taken. She does not see a podiatrist.Eye exam is current. The following portions of the patient's history were reviewed and updated as appropriate: allergies, current medications, past family history, past medical history, past social history, past surgical history and problem list.    Review of Systems   Constitutional:  Positive for fatigue. HENT: Negative. Eyes: Negative. Respiratory: Negative. Cardiovascular: Negative. Endocrine: Negative. Genitourinary: Negative. Musculoskeletal:  Positive for arthralgias (knee pain) and gait problem. Hematological: Negative. Psychiatric/Behavioral: Negative.            Current Outpatient Medications   Medication Sig Dispense Refill   • acetaminophen (TYLENOL) 500 mg tablet Take 1-2 tablets (500-1,000 mg total) by mouth 3 (three) times a day as needed for mild pain, headaches or fever 540 tablet 3   • apixaban (Eliquis) 5 mg TAKE 1 TABLET TWICE A  tablet 3   • aspirin (ECOTRIN LOW STRENGTH) 81 mg EC tablet Take 1 tablet (81 mg total) by mouth daily 30 tablet 0   • atorvastatin (LIPITOR) 40 mg tablet TAKE 1 TABLET BY MOUTH EVERY DAY 90 tablet 1   • busPIRone (BUSPAR) 7.5 mg tablet Take 1 tablet (7.5 mg total) by mouth daily at bedtime 90 tablet 0   • cyanocobalamin (VITAMIN B-12) 100 mcg tablet Take by mouth daily     • folic acid (FOLVITE) 1 mg tablet Take 1 tablet (1 mg total) by mouth daily 90 tablet 1   • furosemide (LASIX) 20 mg tablet Take 2 tablets (40 mg total) by mouth daily as needed (for lower leg swelling) 60 tablet 0   • gabapentin (Neurontin) 100 mg capsule Take 2 capsules (200 mg total) by mouth 2 (two) times a day 360 capsule 0   • linaGLIPtin (Tradjenta) 5 MG TABS Take 5 mg by mouth daily 90 tablet 3   • loperamide (IMODIUM) 2 mg capsule Take 2 mg by mouth 4 (four) times a day as needed for diarrhea     • loratadine (CLARITIN) 10 mg tablet Take 10 mg by mouth as needed     • metFORMIN (GLUCOPHAGE) 500 mg tablet Take 1 tablet (500 mg total) by mouth 2 (two) times a day with meals 180 tablet 0   • metoprolol tartrate (LOPRESSOR) 25 mg tablet Take 1 tablet (25 mg total) by mouth every 12 (twelve) hours 180 tablet 3   • omeprazole (PriLOSEC) 20 mg delayed release capsule Take 1 capsule (20 mg total) by mouth daily 90 capsule 1   • potassium chloride (Klor-Con M10) 10 mEq tablet Take 1 tablet (10 mEq total) by mouth daily 90 tablet 3   • sotalol (BETAPACE) 80 mg tablet Take 1 tablet (80 mg total) by mouth 2 (two) times a day 180 tablet 3   • Sotorasib 120 MG TABS Take 960 mg by mouth daily 240 tablet 0   • valsartan (DIOVAN) 160 mg tablet TAKE 1 TABLET BY MOUTH TWICE A  tablet 3   • venlafaxine (EFFEXOR) 37.5 mg tablet Take 2 tablets (75 mg total) by mouth daily at bedtime 180 tablet 0   • sucralfate (CARAFATE) 1 g tablet Take 1 tablet (1 g total) by mouth 3 (three) times a day for 14 days Crush and mix with 1/4 cup of water. Drink. 42 tablet 0     No current facility-administered medications for this visit. Objective:    /70 (BP Location: Left arm, Patient Position: Sitting, Cuff Size: Standard)   Pulse 63   Temp (!) 95.6 °F (35.3 °C) (Tympanic)   Resp 17   Ht 5' 2" (1.575 m)   Wt 77.7 kg (171 lb 3.2 oz)   LMP  (LMP Unknown)   SpO2 95%   BMI 31.31 kg/m²        Physical Exam  Vitals and nursing note reviewed. Constitutional:       Appearance: Normal appearance. She is well-developed. HENT:      Head: Normocephalic and atraumatic. Nose: Nose normal.   Eyes:      General: Lids are normal.      Conjunctiva/sclera: Conjunctivae normal.      Pupils: Pupils are equal, round, and reactive to light. Cardiovascular:      Rate and Rhythm: Normal rate and regular rhythm. Pulses: Pulses are weak. Dorsalis pedis pulses are 2+ on the right side and 1+ on the left side. Posterior tibial pulses are 2+ on the right side and 1+ on the left side. Heart sounds: Normal heart sounds, S1 normal and S2 normal.   Pulmonary:      Effort: Pulmonary effort is normal.      Breath sounds: Normal breath sounds. Abdominal:      General: Bowel sounds are normal.      Palpations: Abdomen is soft. Musculoskeletal:         General: Normal range of motion. Cervical back: Normal range of motion and neck supple. Feet:      Right foot:      Skin integrity: Dry skin present. Left foot:      Skin integrity: Dry skin present. Skin:     General: Skin is warm and dry. Neurological:      General: No focal deficit present.       Mental Status: She is alert and oriented to person, place, and time.      Deep Tendon Reflexes: Reflexes are normal and symmetric. Psychiatric:         Mood and Affect: Mood normal.         Speech: Speech normal.         Behavior: Behavior normal.         Thought Content:  Thought content normal.         Judgment: Judgment normal.                Pinky Álvarez, DO

## 2023-12-18 DIAGNOSIS — C34.12 MALIGNANT NEOPLASM OF UPPER LOBE OF LEFT LUNG (HCC): ICD-10-CM

## 2023-12-18 RX ORDER — ATORVASTATIN CALCIUM 40 MG/1
40 TABLET, FILM COATED ORAL DAILY
Qty: 90 TABLET | Refills: 1 | Status: SHIPPED | OUTPATIENT
Start: 2023-12-18

## 2023-12-20 ENCOUNTER — OFFICE VISIT (OUTPATIENT)
Dept: PALLIATIVE MEDICINE | Facility: CLINIC | Age: 73
End: 2023-12-20
Payer: MEDICARE

## 2023-12-20 VITALS
DIASTOLIC BLOOD PRESSURE: 70 MMHG | HEART RATE: 65 BPM | HEIGHT: 62 IN | OXYGEN SATURATION: 98 % | WEIGHT: 172 LBS | TEMPERATURE: 95.9 F | BODY MASS INDEX: 31.65 KG/M2 | SYSTOLIC BLOOD PRESSURE: 110 MMHG

## 2023-12-20 DIAGNOSIS — T45.1X5A NEUROPATHY DUE TO CHEMOTHERAPEUTIC DRUG: ICD-10-CM

## 2023-12-20 DIAGNOSIS — C79.51 SECONDARY MALIGNANT NEOPLASM OF BONE AND BONE MARROW (HCC): ICD-10-CM

## 2023-12-20 DIAGNOSIS — F32.5 MAJOR DEPRESSIVE DISORDER WITH SINGLE EPISODE, IN FULL REMISSION (HCC): ICD-10-CM

## 2023-12-20 DIAGNOSIS — K21.9 GASTROESOPHAGEAL REFLUX DISEASE WITHOUT ESOPHAGITIS: ICD-10-CM

## 2023-12-20 DIAGNOSIS — C79.52 SECONDARY MALIGNANT NEOPLASM OF BONE AND BONE MARROW (HCC): ICD-10-CM

## 2023-12-20 DIAGNOSIS — F41.9 ANXIETY: ICD-10-CM

## 2023-12-20 DIAGNOSIS — C34.92 ADENOCARCINOMA OF LEFT LUNG, STAGE 4 (HCC): Primary | ICD-10-CM

## 2023-12-20 DIAGNOSIS — G62.0 NEUROPATHY DUE TO CHEMOTHERAPEUTIC DRUG: ICD-10-CM

## 2023-12-20 DIAGNOSIS — Z51.5 PALLIATIVE CARE PATIENT: ICD-10-CM

## 2023-12-20 DIAGNOSIS — M25.50 JOINT PAIN: ICD-10-CM

## 2023-12-20 PROCEDURE — 99214 OFFICE O/P EST MOD 30 MIN: CPT | Performed by: INTERNAL MEDICINE

## 2023-12-20 RX ORDER — GABAPENTIN 100 MG/1
200 CAPSULE ORAL 2 TIMES DAILY
Qty: 360 CAPSULE | Refills: 3 | Status: SHIPPED | OUTPATIENT
Start: 2023-12-20

## 2023-12-20 NOTE — PATIENT INSTRUCTIONS
It was good to see you today. Thank you for coming in.    I hope your upcoming scan shows good results!  Continue current medications.  Return in about 2 months (around 2/20/2024).  Call us for refills on medications that we supply, as needed.  If something changes and you need to come in sooner, please call our office.    PRESCRIPTION REFILL REMINDER:  All medication refills should be requested prior to Noon on Friday. Any refill requests after noon on Friday would be addressed the following Monday.    MEDICATION SAFETY ISSUES:  Do not drive under the influence of narcotics (including opioids), watch for adverse effects including confusion / altered mental status / respiratory depression (slowed breathing), keep medications stored in a safe/locked environment, do not use alcohol while opioids or other narcotics are in your system.

## 2023-12-20 NOTE — PROGRESS NOTES
Follow-up with Palliative and Supportive Care  Melany Griffith 73 y.o. female [id number]    ASSESSMENT & PLAN:  1. Adenocarcinoma of left lung, stage 4 (HCC)    2. 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 Gastroesophageal reflux disease without esophagitis    4. Anxiety    5. Major depressive disorder with single episode, in full remission (HCC)    6. Neuropathy due to chemotherapeutic drug     7. Joint pain    8. Palliative care patient          Patient reports her mood regimen remains effective.  May use Benadryl PRN insomnia.  Continue buspirone 7.5mg QHS. Please note that sotorazib lowers serum concentrations of buspirone.  Continue venlafaxine at 75mg QHS.  Counseled on caregiver stress.  Continue omeprazole for reflux.   Patient reports her chronic polyarthralgia and neuropathy are well-managed w/o needing oxyIR recently. Some of her chronic pain may be cancer-related.  Continue Tylenol PRN. No interactions between sotorasib and Tylenol.  May use oxyIR PRN chronic pain.  Continue gabapentin at 200mg q12h. Effective. Tolerated w/o issue.  Continue disease-directed cares.  ACP: Patient has completed advanced directives (POLST and Power of ). In EMR.  Reviewed notes (PCP), labs (10/12/23 Cr 1.42, eGFR 36, , alb 3.6, Hb 11.3; 9/27/23 alb 3.4, Cr 1.06, eGFR 51, , Hb 10.2), imaging (7/28/23 PET CT).  Return in about 2 months (around 2/20/2024).  Emotional support provided.  Medication safety issues addressed - no driving under the influence of narcotics (including opioids), watch for adverse effects including AMS or respiratory depression (slowed breathing), keep medications stored in a safe/locked environment, do not use alcohol while opioids or other narcotics are in one's system.      Requested Prescriptions     Signed Prescriptions Disp Refills    gabapentin (Neurontin) 100 mg capsule 360 capsule 3     Sig: Take 2 capsules (200 mg total) by mouth 2 (two) times a day  "      Medications Discontinued During This Encounter   Medication Reason    gabapentin (Neurontin) 100 mg capsule Reorder       Representatives have queried the patient's controlled substance dispensing history in the Prescription Drug Monitoring Program in compliance with regulations before I have prescribed any controlled substances. The prescription history is consistent with prescribed therapy and our practice policies.      30 minutes were spent in this ambulatory visit with greater than 50% of the time spent face to face with patient in counseling or coordination of care including symptom assessment and management, medication review, psychosocial support, chart review, imaging review, lab review, goals of care, supportive listening, and anticipatory guidance. All of the patient's questions were answered during this discussion.    SUBJECTIVE:  Chief Complaint   Patient presents with    Follow-up    Cancer    Pain    Anxiety    Depression    Counseling        HPI    Melany Griffith is a 73 y.o. female w/ stage IV non-small cell lung cancer (diagnosed 2016) w/ osseous metastasis s/p chemotherapy + immunotherapy + RT, DM2, HFpEF, Afib, CKD, HTN+HLD, h/o CVA. She follows w/ Annelise Darling PA-C + Dr Sam (Medical Oncology), Dr ROSALEE Fuchs (Radiation Oncology), [facility], Dr Arita (Cardiology), Dr Soto (Neurology). Plan includes systemic therapy w/ sotorasib.    Patient is known to [facility] clinic; seen 11/6/23 for symptom assessment and management, medication review, psychosocial support, chart review, imaging review, lab review, supportive listening, and anticipatory guidance.    Patient denies major symptoms today. She feels her pain is well-managed, and she is primarily relying on Tylenol and gabapentin for analgesia. She denies recent nausea, denies recent vomiting, denies recent bowel issues. She feels her appetite is \"good\". She states her sleep is \"great\". She goes to bed around 8 PM, as that " is when her brother goes to sleep, and she feels her sleep is restorative.    Patient does endorse some caregiver stress. Now that her brother is back home, she is the primary caregiver. The patient's brother is unable to eat on his own, so she needs to feed him. She reports her brother also is feeling some anxiousness about his health since coming home from the hospital, and he does not want her to leave the home. Her brother has a home health aide for 1 hour about 2 times a week, and visiting nurse services, which is a boon. Her cousin also helps w/ care.    PDMP shows no concerns.    Review of Systems   All other systems reviewed and are negative.    The following portions of the medical history were reviewed: past medical history, surgical history, problem list, medication list, family history, and social history.      Current Outpatient Medications:     acetaminophen (TYLENOL) 500 mg tablet, Take 1-2 tablets (500-1,000 mg total) by mouth 3 (three) times a day as needed for mild pain, headaches or fever, Disp: 540 tablet, Rfl: 3    apixaban (Eliquis) 5 mg, TAKE 1 TABLET TWICE A DAY, Disp: 180 tablet, Rfl: 3    aspirin (ECOTRIN LOW STRENGTH) 81 mg EC tablet, Take 1 tablet (81 mg total) by mouth daily, Disp: 30 tablet, Rfl: 0    atorvastatin (LIPITOR) 40 mg tablet, Take 1 tablet (40 mg total) by mouth daily, Disp: 90 tablet, Rfl: 1    busPIRone (BUSPAR) 7.5 mg tablet, Take 1 tablet (7.5 mg total) by mouth daily at bedtime, Disp: 90 tablet, Rfl: 0    cyanocobalamin (VITAMIN B-12) 100 mcg tablet, Take by mouth daily, Disp: , Rfl:     folic acid (FOLVITE) 1 mg tablet, Take 1 tablet (1 mg total) by mouth daily, Disp: 90 tablet, Rfl: 1    furosemide (LASIX) 20 mg tablet, Take 2 tablets (40 mg total) by mouth daily as needed (for lower leg swelling), Disp: 60 tablet, Rfl: 0    gabapentin (Neurontin) 100 mg capsule, Take 2 capsules (200 mg total) by mouth 2 (two) times a day, Disp: 360 capsule, Rfl: 3    linaGLIPtin  "(Tradjenta) 5 MG TABS, Take 5 mg by mouth daily, Disp: 90 tablet, Rfl: 3    loperamide (IMODIUM) 2 mg capsule, Take 2 mg by mouth 4 (four) times a day as needed for diarrhea, Disp: , Rfl:     loratadine (CLARITIN) 10 mg tablet, Take 10 mg by mouth as needed, Disp: , Rfl:     metFORMIN (GLUCOPHAGE) 500 mg tablet, Take 1 tablet (500 mg total) by mouth 2 (two) times a day with meals, Disp: 180 tablet, Rfl: 0    metoprolol tartrate (LOPRESSOR) 25 mg tablet, Take 1 tablet (25 mg total) by mouth every 12 (twelve) hours, Disp: 180 tablet, Rfl: 3    omeprazole (PriLOSEC) 20 mg delayed release capsule, Take 1 capsule (20 mg total) by mouth daily, Disp: 90 capsule, Rfl: 1    potassium chloride (Klor-Con M10) 10 mEq tablet, Take 1 tablet (10 mEq total) by mouth daily, Disp: 90 tablet, Rfl: 3    sotalol (BETAPACE) 80 mg tablet, Take 1 tablet (80 mg total) by mouth 2 (two) times a day, Disp: 180 tablet, Rfl: 3    Sotorasib 120 MG TABS, Take 960 mg by mouth daily, Disp: 240 tablet, Rfl: 11    valsartan (DIOVAN) 160 mg tablet, TAKE 1 TABLET BY MOUTH TWICE A DAY, Disp: 180 tablet, Rfl: 3    venlafaxine (EFFEXOR) 37.5 mg tablet, Take 2 tablets (75 mg total) by mouth daily at bedtime, Disp: 180 tablet, Rfl: 0    sucralfate (CARAFATE) 1 g tablet, Take 1 tablet (1 g total) by mouth 3 (three) times a day for 14 days Crush and mix with 1/4 cup of water. Drink., Disp: 42 tablet, Rfl: 0    OBJECTIVE:  /70 (BP Location: Left arm, Patient Position: Sitting, Cuff Size: Standard)   Pulse 65   Temp (!) 95.9 °F (35.5 °C) (Tympanic)   Ht 5' 2\" (1.575 m)   Wt 78 kg (172 lb)   LMP  (LMP Unknown)   SpO2 98%   BMI 31.46 kg/m²   Physical Exam  Vitals reviewed.   Constitutional:       General: She is not in acute distress.     Appearance: She is well-groomed and overweight. She is not toxic-appearing.   HENT:      Head: Normocephalic and atraumatic.      Right Ear: External ear normal.      Left Ear: External ear normal.   Eyes:      " "General: No scleral icterus.        Right eye: No discharge.         Left eye: No discharge.      Extraocular Movements: Extraocular movements intact.      Conjunctiva/sclera: Conjunctivae normal.      Pupils: Pupils are equal, round, and reactive to light.   Cardiovascular:      Rate and Rhythm: Normal rate.   Pulmonary:      Effort: Pulmonary effort is normal. No tachypnea, bradypnea, accessory muscle usage or respiratory distress.      Comments: Able to speak comfortably in complete sentences on room air at rest.  Abdominal:      General: There is no distension.      Tenderness: There is no guarding.   Musculoskeletal:      Cervical back: Normal range of motion.      Right lower leg: No edema.      Left lower leg: No edema.   Skin:     General: Skin is dry.      Coloration: Skin is not pale.   Neurological:      Mental Status: She is alert and oriented to person, place, and time.      Cranial Nerves: No dysarthria or facial asymmetry.      Comments: Using no assistive devices for ambulation.   Psychiatric:         Attention and Perception: Attention normal.         Mood and Affect: Mood and affect normal.         Speech: Speech normal.         Behavior: Behavior normal. Behavior is cooperative.         Thought Content: Thought content normal.         Cognition and Memory: Cognition and memory normal.         Judgment: Judgment normal.          Giles Orr MD  [facility]  [phone]    Portions of this document may have been created using dictation software and as such some \"sound alike\" terms may have been generated by the system. Do not hesitate to contact me with any questions or clarifications.     This note was not shared with the patient due to privacy exception: note includes other individuals.  "

## 2023-12-21 ENCOUNTER — TELEPHONE (OUTPATIENT)
Dept: CARDIOLOGY CLINIC | Facility: CLINIC | Age: 73
End: 2023-12-21

## 2023-12-21 NOTE — TELEPHONE ENCOUNTER
Melany Nacho 1950 [phone]   When I look into my chart it says tomorrow I have an appointment. Yet when the office called me the other day they said today. So I'm just verifying which day it is. Thank you.

## 2023-12-22 ENCOUNTER — DOCUMENTATION (OUTPATIENT)
Dept: CARDIOLOGY CLINIC | Facility: CLINIC | Age: 73
End: 2023-12-22

## 2023-12-22 ENCOUNTER — HOSPITAL ENCOUNTER (OUTPATIENT)
Dept: RADIOLOGY | Age: 73
Discharge: HOME | End: 2023-12-22
Payer: MEDICARE

## 2023-12-22 ENCOUNTER — OFFICE VISIT (OUTPATIENT)
Dept: CARDIOLOGY CLINIC | Facility: CLINIC | Age: 73
End: 2023-12-22
Payer: MEDICARE

## 2023-12-22 VITALS
BODY MASS INDEX: 31.62 KG/M2 | OXYGEN SATURATION: 97 % | WEIGHT: 171.8 LBS | SYSTOLIC BLOOD PRESSURE: 118 MMHG | HEIGHT: 62 IN | DIASTOLIC BLOOD PRESSURE: 74 MMHG | HEART RATE: 64 BPM

## 2023-12-22 DIAGNOSIS — E78.2 MIXED HYPERLIPIDEMIA: ICD-10-CM

## 2023-12-22 DIAGNOSIS — I48.0 PAROXYSMAL ATRIAL FIBRILLATION (HCC): Primary | ICD-10-CM

## 2023-12-22 DIAGNOSIS — I10 PRIMARY HYPERTENSION: ICD-10-CM

## 2023-12-22 DIAGNOSIS — C34.92 ADENOCARCINOMA OF LEFT LUNG, STAGE 4 (HCC): ICD-10-CM

## 2023-12-22 DIAGNOSIS — C34.12 MALIGNANT NEOPLASM OF UPPER LOBE OF LEFT LUNG (HCC): ICD-10-CM

## 2023-12-22 LAB — GLUCOSE SERPL-MCNC: 129 MG/DL (ref 65–140)

## 2023-12-22 PROCEDURE — 78815 PET IMAGE W/CT SKULL-THIGH: CPT

## 2023-12-22 PROCEDURE — A9552 F18 FDG: HCPCS

## 2023-12-22 PROCEDURE — 99214 OFFICE O/P EST MOD 30 MIN: CPT | Performed by: INTERNAL MEDICINE

## 2023-12-22 PROCEDURE — G1004 CDSM NDSC: HCPCS

## 2023-12-22 PROCEDURE — 82948 REAGENT STRIP/BLOOD GLUCOSE: CPT

## 2023-12-22 PROCEDURE — 93000 ELECTROCARDIOGRAM COMPLETE: CPT | Performed by: INTERNAL MEDICINE

## 2023-12-22 NOTE — PROGRESS NOTES
Cardiology   Melany Griffith 73 y.o. female MRN: [id number]        Impression:  1. Paroxysmal atrial fibrillation - in normal sinus rhythm. On antiarrhythmic medications. On anticoagulation.   2. Hypertension - controlled.   3. Dyslipidemia - on atorvastatin.  4. Stage IV Lung Ca     Recommendations:  1. Continue current medications.   2. Follow up in 6 months.         HPI: Melany Griffith is a 73 y.o. year old female with hypertension, diabetes, stage IV lung ca, paroxysmal atrial fibrillation, who returns for follow up. Feels fatigued from chemo.  No chest pain, shortness of breath, or palpitations.           Review of Systems   Constitutional:  Positive for fatigue.   HENT: Negative.     Eyes: Negative.    Respiratory:  Negative for chest tightness and shortness of breath.    Cardiovascular:  Negative for chest pain, palpitations and leg swelling.   Gastrointestinal: Negative.    Endocrine: Negative.    Genitourinary: Negative.    Musculoskeletal:  Positive for gait problem.   Skin: Negative.    Allergic/Immunologic: Negative.    Hematological: Negative.    Psychiatric/Behavioral: Negative.     All other systems reviewed and are negative.        Past Medical History:   Diagnosis Date    A-fib (HCC)     Arthritis     Atrial fibrillation (HCC)     Confusion     Diabetes mellitus (HCC)     Diabetes mellitus type 2, uncomplicated (HCC)     Last assessed: 8/17/17    Encephalopathy 1/6/2022    Essential hypertension     Frozen shoulder     L shoulder    GERD (gastroesophageal reflux disease)     Hx of cancer of uterus     Last assessed: 8/21/15    Hyperlipidemia     Hypertension     Hyponatremia 11/16/2016    Lung mass     diagnosed 9/2016    Malignant neoplasm without specification of site (HCC)     Skin cancer, basal cell     right eye area    Stage 4 lung cancer (HCC)     Thrombocytopenia, unspecified (HCC) 1/20/2023     Past Surgical History:   Procedure Laterality Date    APPENDECTOMY      BRONCHOSCOPY N/A  2016    Procedure: BRONCHOSCOPY FLEXIBLE;  Surgeon: Giles Alonso MD;  Location: [facility];  Service:     CHOLECYSTECTOMY      COLONOSCOPY      COLONOSCOPY      FL GUIDED CENTRAL VENOUS ACCESS DEVICE INSERTION  2021    GALLBLADDER SURGERY      HYSTERECTOMY      Total abdominal    JOINT REPLACEMENT      LARYNGOSCOPY      Flexible Fiberoptic, (Therapeutic), Resolved: 16    MOHS SURGERY      Micrographic Surgery Face    [facility] INCL FLUOR GDNCE DX W/CELL WASHG SPX N/A 2017    Procedure: BRONCHOSCOPY FLEXIBLE;  Surgeon: Denzel Manning MD;  Location: [facility];  Service: Pulmonary    [facility] BRONCHOSCOPY NEEDLE BX TRACHEA MAIN STEM&/BRON N/A 2016    Procedure: EBUS; FROZEN SECTION ;  Surgeon: Giles Alonso MD;  Location: [facility];  Service: Thoracic    [place] INSJ TUNNELED CTR VAD W/SUBQ PORT AGE 5 YR/> N/A 2021    Procedure: INSERTION VENOUS PORT ( PORT-A-CATH) IR;  Surgeon: Hansel Garduno DO;  [facility];  Service: Interventional Radiology    TONSILECTOMY AND ADNOIDECTOMY      TONSILLECTOMY      TOTAL KNEE ARTHROPLASTY Right 2014     Social History     Substance and Sexual Activity   Alcohol Use No     Social History     Substance and Sexual Activity   Drug Use No     Social History     Tobacco Use   Smoking Status Former    Current packs/day: 0.00    Average packs/day: 1 pack/day for 50.0 years (50.0 ttl pk-yrs)    Types: Cigarettes    Start date:     Quit date:     Years since quittin.9   Smokeless Tobacco Never   Tobacco Comments    Quit in      Family History   Problem Relation Age of Onset    Heart disease Father         cardiac disorder    Hypertension Father     Arthritis Father     Stroke Father         cerebrovascular accident    Skin cancer Father     Diabetes Mother     Heart disease Mother     Dementia Mother     Hypertension Mother     Thyroid disease Mother     Cancer Maternal Grandfather         of unknown origin    Cancer Family      Depression Family     Diabetes Family     Hyperlipidemia Family         essential    Heart disease Family     Hypertension Family     Stroke Family         syndrome    Lung cancer Paternal Uncle     Muscular dystrophy Brother        Allergies:  Allergies   Allergen Reactions    Amoxicillin Hives    Amoxicillin Rash and Hives    Cardizem [Diltiazem] Rash     Rash      Statins Myalgia     Severe muscle aching  Terrible pains    Zofran [Ondansetron] Palpitations       Medications:     Current Outpatient Medications:     acetaminophen (TYLENOL) 500 mg tablet, Take 1-2 tablets (500-1,000 mg total) by mouth 3 (three) times a day as needed for mild pain, headaches or fever, Disp: 540 tablet, Rfl: 3    apixaban (Eliquis) 5 mg, TAKE 1 TABLET TWICE A DAY, Disp: 180 tablet, Rfl: 3    aspirin (ECOTRIN LOW STRENGTH) 81 mg EC tablet, Take 1 tablet (81 mg total) by mouth daily, Disp: 30 tablet, Rfl: 0    atorvastatin (LIPITOR) 40 mg tablet, Take 1 tablet (40 mg total) by mouth daily, Disp: 90 tablet, Rfl: 1    busPIRone (BUSPAR) 7.5 mg tablet, Take 1 tablet (7.5 mg total) by mouth daily at bedtime, Disp: 90 tablet, Rfl: 0    cyanocobalamin (VITAMIN B-12) 100 mcg tablet, Take by mouth daily, Disp: , Rfl:     folic acid (FOLVITE) 1 mg tablet, Take 1 tablet (1 mg total) by mouth daily, Disp: 90 tablet, Rfl: 1    furosemide (LASIX) 20 mg tablet, Take 2 tablets (40 mg total) by mouth daily as needed (for lower leg swelling), Disp: 60 tablet, Rfl: 0    gabapentin (Neurontin) 100 mg capsule, Take 2 capsules (200 mg total) by mouth 2 (two) times a day, Disp: 360 capsule, Rfl: 3    linaGLIPtin (Tradjenta) 5 MG TABS, Take 5 mg by mouth daily, Disp: 90 tablet, Rfl: 3    loperamide (IMODIUM) 2 mg capsule, Take 2 mg by mouth 4 (four) times a day as needed for diarrhea, Disp: , Rfl:     loratadine (CLARITIN) 10 mg tablet, Take 10 mg by mouth as needed, Disp: , Rfl:     metFORMIN (GLUCOPHAGE) 500 mg tablet, Take 1 tablet (500 mg total) by  mouth 2 (two) times a day with meals, Disp: 180 tablet, Rfl: 0    metoprolol tartrate (LOPRESSOR) 25 mg tablet, Take 1 tablet (25 mg total) by mouth every 12 (twelve) hours, Disp: 180 tablet, Rfl: 3    omeprazole (PriLOSEC) 20 mg delayed release capsule, Take 1 capsule (20 mg total) by mouth daily, Disp: 90 capsule, Rfl: 1    potassium chloride (Klor-Con M10) 10 mEq tablet, Take 1 tablet (10 mEq total) by mouth daily, Disp: 90 tablet, Rfl: 3    sotalol (BETAPACE) 80 mg tablet, Take 1 tablet (80 mg total) by mouth 2 (two) times a day, Disp: 180 tablet, Rfl: 3    Sotorasib 120 MG TABS, Take 960 mg by mouth daily, Disp: 240 tablet, Rfl: 11    valsartan (DIOVAN) 160 mg tablet, TAKE 1 TABLET BY MOUTH TWICE A DAY, Disp: 180 tablet, Rfl: 3    venlafaxine (EFFEXOR) 37.5 mg tablet, Take 2 tablets (75 mg total) by mouth daily at bedtime, Disp: 180 tablet, Rfl: 0    sucralfate (CARAFATE) 1 g tablet, Take 1 tablet (1 g total) by mouth 3 (three) times a day for 14 days Crush and mix with 1/4 cup of water. Drink. (Patient not taking: Reported on 12/22/2023), Disp: 42 tablet, Rfl: 0      Wt Readings from Last 3 Encounters:   12/22/23 77.9 kg (171 lb 12.8 oz)   12/20/23 78 kg (172 lb)   11/14/23 77.7 kg (171 lb 3.2 oz)     Temp Readings from Last 3 Encounters:   12/20/23 (!) 95.9 °F (35.5 °C) (Tympanic)   11/14/23 (!) 95.6 °F (35.3 °C) (Tympanic)   11/06/23 (!) 96.8 °F (36 °C) (Temporal)     BP Readings from Last 3 Encounters:   12/22/23 118/74   12/20/23 110/70   11/14/23 126/70     Pulse Readings from Last 3 Encounters:   12/22/23 64   12/20/23 65   11/14/23 63         Physical Exam  Constitutional:       Appearance: Normal appearance.   HENT:      Head: Atraumatic.      Mouth/Throat:      Mouth: Mucous membranes are moist.   Eyes:      Extraocular Movements: Extraocular movements intact.   Cardiovascular:      Rate and Rhythm: Normal rate and regular rhythm.      Heart sounds: Normal heart sounds.   Pulmonary:      Effort:  Pulmonary effort is normal.      Breath sounds: Normal breath sounds.   Abdominal:      General: Abdomen is flat.   Musculoskeletal:         General: Normal range of motion.      Cervical back: Normal range of motion.   Skin:     General: Skin is warm.   Neurological:      General: No focal deficit present.      Mental Status: She is alert and oriented to person, place, and time.   Psychiatric:         Mood and Affect: Mood normal.         Behavior: Behavior normal.           Laboratory Studies:  CMP:  Lab Results   Component Value Date     11/23/2015    K 4.3 10/12/2023    CL 99 10/12/2023    CO2 31 10/12/2023    ANIONGAP 9 11/23/2015    BUN 18 10/12/2023    CREATININE 1.42 (H) 10/12/2023    GLUCOSE 98 01/03/2022    AST 14 10/12/2023    ALT 9 10/12/2023    BILITOT 0.65 11/23/2015    EGFR 36 10/12/2023       Lipid Profile:   Lab Results   Component Value Date    CHOL 229 11/23/2015     Lab Results   Component Value Date    HDL 55 04/06/2023     Lab Results   Component Value Date    LDLCALC 63 04/06/2023     Lab Results   Component Value Date    TRIG 114 04/06/2023       Cardiac testing:   EKG reviewed personally: NSR Travis NSSANUJ

## 2023-12-29 ENCOUNTER — HOSPITAL ENCOUNTER (OUTPATIENT)
Dept: RADIOLOGY | Age: 73
Discharge: HOME | End: 2023-12-29

## 2024-01-01 ENCOUNTER — DOCUMENTATION (OUTPATIENT)
Dept: GERIATRICS | Facility: OTHER | Age: 74
End: 2024-01-01

## 2024-01-01 ENCOUNTER — HOSPITAL ENCOUNTER (INPATIENT)
Facility: HOSPITAL | Age: 74
LOS: 10 days | End: 2024-06-18
Attending: INTERNAL MEDICINE | Admitting: INTERNAL MEDICINE
Payer: MEDICARE

## 2024-01-01 ENCOUNTER — NURSING HOME VISIT (OUTPATIENT)
Dept: GERIATRICS | Facility: OTHER | Age: 74
End: 2024-01-01
Payer: MEDICARE

## 2024-01-01 ENCOUNTER — HOME CARE VISIT (OUTPATIENT)
Dept: HOME HEALTH SERVICES | Facility: HOME HEALTHCARE | Age: 74
End: 2024-01-01

## 2024-01-01 ENCOUNTER — HOSPICE ADMISSION (OUTPATIENT)
Dept: HOME HOSPICE | Facility: HOSPICE | Age: 74
End: 2024-01-01

## 2024-01-01 DIAGNOSIS — R79.89 ELEVATED LFTS: ICD-10-CM

## 2024-01-01 DIAGNOSIS — D72.823 LEUKEMOID REACTION: ICD-10-CM

## 2024-01-01 DIAGNOSIS — I48.0 PAROXYSMAL ATRIAL FIBRILLATION (HCC): ICD-10-CM

## 2024-01-01 DIAGNOSIS — E78.5 DYSLIPIDEMIA: ICD-10-CM

## 2024-01-01 DIAGNOSIS — Z71.89 COUNSELING REGARDING ADVANCE CARE PLANNING AND GOALS OF CARE: ICD-10-CM

## 2024-01-01 DIAGNOSIS — Z86.73 HISTORY OF STROKE: ICD-10-CM

## 2024-01-01 DIAGNOSIS — D63.8 ANEMIA IN OTHER CHRONIC DISEASES CLASSIFIED ELSEWHERE: ICD-10-CM

## 2024-01-01 DIAGNOSIS — S31.000D WOUND OF SACRAL REGION, SUBSEQUENT ENCOUNTER: ICD-10-CM

## 2024-01-01 DIAGNOSIS — F41.9 ANXIETY: ICD-10-CM

## 2024-01-01 DIAGNOSIS — C34.92 ADENOCARCINOMA OF LEFT LUNG, STAGE 4 (HCC): Primary | ICD-10-CM

## 2024-01-01 DIAGNOSIS — N18.4 STAGE 4 CHRONIC KIDNEY DISEASE (HCC): ICD-10-CM

## 2024-01-01 DIAGNOSIS — K21.9 GASTROESOPHAGEAL REFLUX DISEASE WITHOUT ESOPHAGITIS: ICD-10-CM

## 2024-01-01 DIAGNOSIS — I50.32 CHRONIC DIASTOLIC HEART FAILURE (HCC): ICD-10-CM
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
## 2024-01-01 DIAGNOSIS — N18.30 TYPE 2 DIABETES MELLITUS WITH STAGE 3 CHRONIC KIDNEY DISEASE, WITHOUT LONG-TERM CURRENT USE OF INSULIN, UNSPECIFIED WHETHER STAGE 3A OR 3B CKD (HCC): ICD-10-CM

## 2024-01-01 DIAGNOSIS — S31.000D WOUND OF SACRAL REGION, SUBSEQUENT ENCOUNTER: Primary | ICD-10-CM

## 2024-01-01 DIAGNOSIS — E11.22 TYPE 2 DIABETES MELLITUS WITH STAGE 3 CHRONIC KIDNEY DISEASE, WITHOUT LONG-TERM CURRENT USE OF INSULIN, UNSPECIFIED WHETHER STAGE 3A OR 3B CKD (HCC): ICD-10-CM

## 2024-01-01 DIAGNOSIS — Z51.5 HOSPICE CARE: ICD-10-CM

## 2024-01-01 DIAGNOSIS — Z71.89 GOALS OF CARE, COUNSELING/DISCUSSION: ICD-10-CM

## 2024-01-01 DIAGNOSIS — R41.89 COGNITIVE DECLINE: ICD-10-CM

## 2024-01-01 DIAGNOSIS — E87.1 HYPONATREMIA: ICD-10-CM

## 2024-01-01 DIAGNOSIS — I10 PRIMARY HYPERTENSION: ICD-10-CM

## 2024-01-01 LAB
GLUCOSE SERPL-MCNC: 121 MG/DL (ref 65–140)
GLUCOSE SERPL-MCNC: 124 MG/DL (ref 65–140)
GLUCOSE SERPL-MCNC: 126 MG/DL (ref 65–140)
GLUCOSE SERPL-MCNC: 142 MG/DL (ref 65–140)
GLUCOSE SERPL-MCNC: 145 MG/DL (ref 65–140)
GLUCOSE SERPL-MCNC: 148 MG/DL (ref 65–140)
GLUCOSE SERPL-MCNC: 156 MG/DL (ref 65–140)
GLUCOSE SERPL-MCNC: 161 MG/DL (ref 65–140)
GLUCOSE SERPL-MCNC: 165 MG/DL (ref 65–140)
GLUCOSE SERPL-MCNC: 169 MG/DL (ref 65–140)
GLUCOSE SERPL-MCNC: 173 MG/DL (ref 65–140)
GLUCOSE SERPL-MCNC: 183 MG/DL (ref 65–140)
GLUCOSE SERPL-MCNC: 190 MG/DL (ref 65–140)
GLUCOSE SERPL-MCNC: 215 MG/DL (ref 65–140)
GLUCOSE SERPL-MCNC: 224 MG/DL (ref 65–140)
GLUCOSE SERPL-MCNC: 287 MG/DL (ref 65–140)

## 2024-01-01 PROCEDURE — 82948 REAGENT STRIP/BLOOD GLUCOSE: CPT

## 2024-01-01 PROCEDURE — 99309 SBSQ NF CARE MODERATE MDM 30: CPT | Performed by: STUDENT IN AN ORGANIZED HEALTH CARE EDUCATION/TRAINING PROGRAM

## 2024-01-01 PROCEDURE — 99316 NF DSCHRG MGMT 30 MIN+: CPT | Performed by: STUDENT IN AN ORGANIZED HEALTH CARE EDUCATION/TRAINING PROGRAM

## 2024-01-01 PROCEDURE — 99309 SBSQ NF CARE MODERATE MDM 30: CPT | Performed by: INTERNAL MEDICINE

## 2024-01-01 PROCEDURE — NC001 PR NO CHARGE: Performed by: INTERNAL MEDICINE

## 2024-01-01 RX ORDER — MORPHINE SULFATE 100 MG/5ML
5 SOLUTION ORAL EVERY 6 HOURS PRN
Qty: 15 ML | Refills: 0 | Status: SHIPPED | OUTPATIENT
Start: 2024-01-01 | End: 2024-01-01

## 2024-01-01 RX ORDER — MORPHINE SULFATE 100 MG/5ML
5 SOLUTION ORAL
Qty: 30 ML | Refills: 0 | Status: SHIPPED | OUTPATIENT
Start: 2024-01-01 | End: 2024-06-19

## 2024-01-01 RX ORDER — LORAZEPAM 2 MG/ML
0.5 CONCENTRATE ORAL EVERY 6 HOURS PRN
Qty: 30 ML | Refills: 0 | Status: SHIPPED | OUTPATIENT
Start: 2024-01-01 | End: 2024-06-19

## 2024-01-01 RX ORDER — NALOXONE HYDROCHLORIDE 4 MG/.1ML
SPRAY NASAL
Qty: 1 EACH | Refills: 1 | Status: SHIPPED | OUTPATIENT
Start: 2024-01-01 | End: 2024-06-19

## 2024-01-01 NOTE — TELEPHONE ENCOUNTER
4/15 - scheduled for 4/16 - non -fasting You can access the FollowMyHealth Patient Portal offered by [facility] by registering at the following website: [email or web address]. By joining [facility]’s FollowMyHealth portal, you will also be able to view your health information using other applications (apps) compatible with our system.

## 2024-01-05 ENCOUNTER — TELEPHONE (OUTPATIENT)
Dept: HEMATOLOGY ONCOLOGY | Facility: CLINIC | Age: 74
End: 2024-01-05

## 2024-01-08 DIAGNOSIS — I48.91 ATRIAL FIBRILLATION, UNSPECIFIED TYPE (HCC): ICD-10-CM

## 2024-01-08 RX ORDER — SOTALOL HYDROCHLORIDE 80 MG/1
80 TABLET ORAL 2 TIMES DAILY
Qty: 180 TABLET | Refills: 3 | Status: SHIPPED | OUTPATIENT
Start: 2024-01-08

## 2024-01-22 ENCOUNTER — TELEPHONE (OUTPATIENT)
Dept: HEMATOLOGY ONCOLOGY | Facility: CLINIC | Age: 74
End: 2024-01-22

## 2024-01-22 DIAGNOSIS — I48.91 ATRIAL FIBRILLATION, UNSPECIFIED TYPE (HCC): ICD-10-CM

## 2024-01-22 DIAGNOSIS — E11.9 CONTROLLED TYPE 2 DIABETES MELLITUS WITHOUT COMPLICATION, WITHOUT LONG-TERM CURRENT USE OF INSULIN (HCC): ICD-10-CM

## 2024-01-22 DIAGNOSIS — K21.9 GERD WITHOUT ESOPHAGITIS: ICD-10-CM

## 2024-01-22 RX ORDER — LINAGLIPTIN 5 MG/1
5 TABLET, FILM COATED ORAL DAILY
Qty: 90 TABLET | Refills: 3 | Status: SHIPPED | OUTPATIENT
Start: 2024-01-22

## 2024-01-22 RX ORDER — SOTALOL HYDROCHLORIDE 80 MG/1
80 TABLET ORAL 2 TIMES DAILY
Qty: 180 TABLET | Refills: 3 | Status: SHIPPED | OUTPATIENT
Start: 2024-01-22

## 2024-01-22 RX ORDER — OMEPRAZOLE 20 MG/1
20 CAPSULE, DELAYED RELEASE ORAL DAILY
Qty: 90 CAPSULE | Refills: 1 | Status: SHIPPED | OUTPATIENT
Start: 2024-01-22

## 2024-01-22 NOTE — TELEPHONE ENCOUNTER
Returned call to patient.  Explained that we did not change the quantity of pills on her prescription.  She states she only received 40 tablets and usually gets 120.  Last Rx sent from our office was in December 2023 with 11 refills  Patient will reach out to the pharmacy   She will call back with any additional questions or concerns

## 2024-01-22 NOTE — TELEPHONE ENCOUNTER
Patient Call    Who are you speaking with? Patient    If it is not the patient, are they listed on an active communication consent form? N/A   What is the reason for this call? She said she only received 40 tablets of   Lumakras      . She asked if she will continue taking it when she is done with this refill   Does this require a call back? Yes   If a call back is required, please list [facility] call back number [phone]    If a call back is required, advise that a message will be forwarded to their care team and someone will return their call as soon as possible.   Did you relay this information to the patient? Yes

## 2024-01-22 NOTE — TELEPHONE ENCOUNTER
----- Message from Melany Griffith sent at 1/21/2024 12:32 PM EST -----  Regarding: Need Scripts sent to [facility]  Contact: [phone]   I need 90 day scripts sent to [facility] for the following:   Omeprazone 20mg cap  Tradjenta 5mg  Thank you

## 2024-02-01 ENCOUNTER — OFFICE VISIT (OUTPATIENT)
Dept: HEMATOLOGY ONCOLOGY | Facility: CLINIC | Age: 74
End: 2024-02-01
Payer: MEDICARE

## 2024-02-01 ENCOUNTER — HOSPITAL ENCOUNTER (OUTPATIENT)
Dept: INFUSION CENTER | Facility: CLINIC | Age: 74
Discharge: HOME | End: 2024-02-01
Payer: MEDICARE

## 2024-02-01 VITALS
WEIGHT: 171 LBS | BODY MASS INDEX: 31.47 KG/M2 | RESPIRATION RATE: 17 BRPM | HEIGHT: 62 IN | DIASTOLIC BLOOD PRESSURE: 84 MMHG | OXYGEN SATURATION: 99 % | HEART RATE: 69 BPM | TEMPERATURE: 97.2 F | SYSTOLIC BLOOD PRESSURE: 132 MMHG

## 2024-02-01 DIAGNOSIS — C34.12 MALIGNANT NEOPLASM OF UPPER LOBE OF LEFT LUNG (HCC): Primary | ICD-10-CM

## 2024-02-01 DIAGNOSIS — C34.12 MALIGNANT NEOPLASM OF UPPER LOBE OF LEFT LUNG (HCC): ICD-10-CM

## 2024-02-01 DIAGNOSIS — C41.9 MALIGNANT NEOPLASM OF BONE WITH METASTASES (HCC): ICD-10-CM

## 2024-02-01 DIAGNOSIS — Z95.828 PORT-A-CATH IN PLACE: Primary | ICD-10-CM

## 2024-02-01 LAB
ALBUMIN SERPL BCP-MCNC: 3.6 G/DL (ref 3.5–5)
ALP SERPL-CCNC: 93 U/L (ref 34–104)
ALT SERPL W P-5'-P-CCNC: 6 U/L (ref 7–52)
ANION GAP SERPL CALCULATED.3IONS-SCNC: 8 MMOL/L
AST SERPL W P-5'-P-CCNC: 10 U/L (ref 13–39)
BASOPHILS # BLD AUTO: 0.04 THOUSANDS/ÂΜL (ref 0–0.1)
BASOPHILS NFR BLD AUTO: 1 % (ref 0–1)
BILIRUB SERPL-MCNC: 0.39 MG/DL (ref 0.2–1)
BUN SERPL-MCNC: 20 MG/DL (ref 5–25)
CALCIUM SERPL-MCNC: 9.2 MG/DL (ref 8.4–10.2)
CHLORIDE SERPL-SCNC: 105 MMOL/L (ref 96–108)
CO2 SERPL-SCNC: 24 MMOL/L (ref 21–32)
CREAT SERPL-MCNC: 1.19 MG/DL (ref 0.6–1.3)
EOSINOPHIL # BLD AUTO: 0.18 THOUSAND/ÂΜL (ref 0–0.61)
EOSINOPHIL NFR BLD AUTO: 3 % (ref 0–6)
ERYTHROCYTE [DISTWIDTH] IN BLOOD BY AUTOMATED COUNT: 14.2 % (ref 11.6–15.1)
GFR SERPL CREATININE-BSD FRML MDRD: 44 ML/MIN/1.73SQ M
GLUCOSE SERPL-MCNC: 138 MG/DL (ref 65–140)
HCT VFR BLD AUTO: 35.5 % (ref 34.8–46.1)
HGB BLD-MCNC: 10.8 G/DL (ref 11.5–15.4)
IMM GRANULOCYTES # BLD AUTO: 0.03 THOUSAND/UL (ref 0–0.2)
IMM GRANULOCYTES NFR BLD AUTO: 0 % (ref 0–2)
LYMPHOCYTES # BLD AUTO: 0.51 THOUSANDS/ÂΜL (ref 0.6–4.47)
LYMPHOCYTES NFR BLD AUTO: 7 % (ref 14–44)
MCH RBC QN AUTO: 25.7 PG (ref 26.8–34.3)
MCHC RBC AUTO-ENTMCNC: 30.4 G/DL (ref 31.4–37.4)
MCV RBC AUTO: 85 FL (ref 82–98)
MONOCYTES # BLD AUTO: 0.43 THOUSAND/ÂΜL (ref 0.17–1.22)
MONOCYTES NFR BLD AUTO: 6 % (ref 4–12)
NEUTROPHILS # BLD AUTO: 5.97 THOUSANDS/ÂΜL (ref 1.85–7.62)
NEUTS SEG NFR BLD AUTO: 83 % (ref 43–75)
NRBC BLD AUTO-RTO: 0 /100 WBCS
PLATELET # BLD AUTO: 247 THOUSANDS/UL (ref 149–390)
PMV BLD AUTO: 10.5 FL (ref 8.9–12.7)
POTASSIUM SERPL-SCNC: 4.1 MMOL/L (ref 3.5–5.3)
PROT SERPL-MCNC: 6.6 G/DL (ref 6.4–8.4)
RBC # BLD AUTO: 4.2 MILLION/UL (ref 3.81–5.12)
SODIUM SERPL-SCNC: 137 MMOL/L (ref 135–147)
WBC # BLD AUTO: 7.16 THOUSAND/UL (ref 4.31–10.16)

## 2024-02-01 PROCEDURE — 80053 COMPREHEN METABOLIC PANEL: CPT

## 2024-02-01 PROCEDURE — 85025 COMPLETE CBC W/AUTO DIFF WBC: CPT

## 2024-02-01 PROCEDURE — 99214 OFFICE O/P EST MOD 30 MIN: CPT | Performed by: INTERNAL MEDICINE

## 2024-02-01 NOTE — PROGRESS NOTES
Hematology Outpatient Follow - Up Note  Melany Griffith 73 y.o. female MRN: @ Encounter: [id number]        Date:  2/1/2024        Assessment/ Plan:    Stage 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 of the lung with left scapula involvement diagnosed on 08/2016. PDL expression more than 50%, negative for EGFR mutation, ALK  rearrangement, Ros1 mutation     Treated initially with Pembrolizumab complicated with pneumonitis and later on nivolumab with prednisone 10 mg p.o. Daily with excellent response.     2.  Disease progression in August 2018 in the left scapula with pain no new lesions by PET scan.  Status post radiation therapy to the left scapula and treated with Alimta 500 milligram/meter squared, carboplatin AUC 5.    After 3 cycles,  CT scan in January 2019 showed stable disease, and she was placed on maintenance Alimta 500 milligram/meter squared every 3 weeks.        Alimta dose was reduced to 400 milligram/meter IV due to elevated Creatinine and  then Pemetrexed dose was reduced to 300 milligram/meter squared every 4 weeks.   Cycle #38  Alimta was 6/22/21.         3.  Progression of disease 7/2021.        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  (MAP2K1 mutations are mutually exclusive with BRAF mutations)  There are FDA approved therapies indicated for other disease states such as binimetinib, cobimetinib, selumetinib, trametinib.  Given the very small (0.1%) DNA amplification, use of these medications off label are likely to offer minimal benefit.       Nivolumab 240 mg flat dose every 3 weeks and  carboplatin AUC 5 added to  pemetrexed which was dose reduced to 250 milligram/meter squared, initiated 8/2/2021     4.  Progression of disease.  Treatment changed to Taxotere 75mg/m2 10/2021.  Cyramza added with cycle 2 11/2021.     5.  Right basal ganglia and right ischemic CVA on 01/2022 with hospitalization and rehab which may have been secondary to Cyramza     6.   Therapy changed to Navelbine 30 mg per m2 every other week since 02/02/2022.     7. Progression of disease on CAT scan 1/8/2022, initiated on palliative sotorasib 960 mg p.o. daily    Status post radiation therapy to the right thoracic spine    PET scan showed decreased activity of the right middle lobe with viable tumor    Follow-up in 3 months with CT scan of the chest without contrast, CBC, CMP    Patient to have CBC and CMP today as well    Port-A-Cath flush every 56 days        Labs and imaging studies are reviewed by ordering provider once results are available. If there are findings that need immediate attention, you will be contacted when results available.   Discussing results and the implication on your healthcare is best discussed in person at your follow-up visit.       HPI:    Melany Griffith was admitted to the hospital with arrhythmia and was found to have right lower lobe infiltrate in August 2016.  She was treated with antibiotics however repeat chest x-ray showed persistent right lower lobe infiltrate. Subsequently the patient had a CT scan of the chest which showed a right perihilar mass, subcarinal lymphadenopathy, lytic lesion of the right costovertebral junction at T10 level.  PET scan October 2016 showed a right perihilar mass measuring 3.5 cm with SUV of 8.9, subcarinal lymph nodes measuring 3.4 x 2.2 cm with SUV of 9.2. Nodule in the left upper lobe lung measured 2 x 1.1 cm.  There was a lytic lesion involving the right 10th costovertebral junction with SUV of 14.4.     Biopsy showed non-small cell carcinoma with features suggesting of adenocarcinoma positive for CK 7, CK 19, CA-19-9, ANEUDY-3, partially positive for P40, p63, negative for TTF-1.  She had a history of uterine cancer in 2000 status post hysterectomy and did not require radiation or chemotherapy.  She is status post bilateral oophorectomy, right knee replacement,   tonsillectomy.      She used to smoke for 35 years 1 pack per day  however quit smoking 21 years ago.  She used hormonal replacement therapy for 3 years.  She has a family history significant for skin cancer in her father and coronary artery disease in mother.  She has 2 healthy children.     Treated initially with Pembrolizumab December 2016, Finished in May 2017 secondary to grade 3 pneumonitis. Initiated on prednisone.    Progression: Nivolumab 240 mg flat dose every 2 weeks along with prednisone 10 mg p.o. Daily initiated April 2018- October 2018 with excellent response.     Liquid biopsy showed K-MADHURI mutation G12C, no evidence of MSI high        Disease progression in August 2018 in the left scapula with pain no new lesions by PET scan.  Status post radiation therapy to the left scapula and treated with Alimta 500 milligram/meter squared, carboplatin AUC 5.  After 3 cycles,  CT scan in January 2019 showed stable disease. Carbo discontinued 3/2019.   Maintenance Alimta 500 milligram/meter squared every 3 weeks initiated 3/2019.       Cr 2/20 was 1.5.  Plan was to dose reduce Alimta to 400mg/m2; however cr 2.16 2/24/20 and Alimta was held.      3/4/20:  renal u/s  Minimal fullness of the left renal collecting system without matthieu hydronephrosis.   Chronic right kidney lower pole cortical scar, with adjacent parenchymal calcification measuring 5 mm      She was on prednisone 5 mg p.o. daily because of previous history of pneumonitis. CT scan chest 1/3/2020 showed no evidence of infiltration in the lung parenchyma, prednisone was reduced every other day for 1 month and then discontinued.       Progression of disease 7/2021.       Nivolumab 240 mg flat dose every 3 weeks and carboplatin AUC 5 added to pemetrexed which was dose reduced to 250 milligram/meter squared, initiated 8/2/2021     Progression of disease.  Treatment changed to Taxotere 75mg/m2 10/2021.  Cyramza added with cycle 2 11/2021.     Right basal ganglia and right ischemic CVA on 01/2022 with hospitalization and  "rehab which may have been secondary to Cyramza     Therapy changed to Navelbine 30 mg per m2 every other week since 02/02/2022.      8/30/22- CT chest - Enlarged medial right lower lobe spiculated opacity now measuring 3.8 x 1.9 cm previously measured 2.1 x 1.2 suspicious for malignancy.       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        9/2022 sotorasib 960 mg in the morning without food initiated with good tolerance    PET scan on 12/2023 showed decrease in the size and activity of the right lower medial lung mass with residual viable tumor remains, adjacent posttreatment inflammatory changes, no new metastasis  Interval History:        Previous Treatment:         Test Results:    Imaging: No results found.    Labs:   Lab Results   Component Value Date    WBC 8.51 10/12/2023    HGB 11.3 (L) 10/12/2023    HCT 37.5 10/12/2023    MCV 85 10/12/2023     10/12/2023     Lab Results   Component Value Date     11/23/2015    K 4.3 10/12/2023    CL 99 10/12/2023    CO2 31 10/12/2023    ANIONGAP 9 11/23/2015    BUN 18 10/12/2023    CREATININE 1.42 (H) 10/12/2023    GLUCOSE 98 01/03/2022    GLUF 211 (H) 06/01/2023    CALCIUM 9.8 10/12/2023    CORRECTEDCA 9.9 09/27/2023    AST 14 10/12/2023    ALT 9 10/12/2023    ALKPHOS 122 (H) 10/12/2023    PROT 6.8 11/23/2015    BILITOT 0.65 11/23/2015    EGFR 36 10/12/2023       Lab Results   Component Value Date    IRON 79 02/16/2022    TIBC 320 02/16/2022    FERRITIN 49 02/16/2022       No results found for: \"[id number]\"      ROS: Review of Systems - Oncology       Current Medications: Reviewed  Allergies: Reviewed  PMH/FH/SH:  Reviewed      Physical Exam:    Body surface area is 1.79 meters squared.    Wt Readings from Last 3 Encounters:   02/01/24 77.6 kg (171 lb)   12/22/23 77.9 kg (171 lb 12.8 oz)   12/20/23 78 kg (172 lb)        Temp Readings from Last 3 Encounters:   02/01/24 (!) 97.2 °F (36.2 °C) (Temporal)   12/20/23 (!) 95.9 °F (35.5 °C) (Tympanic)   11/14/23 (!) 95.6 " °F (35.3 °C) (Tympanic)        BP Readings from Last 3 Encounters:   02/01/24 132/84   12/22/23 118/74   12/20/23 110/70         Pulse Readings from Last 3 Encounters:   02/01/24 69   12/22/23 64   12/20/23 65        Physical Exam    ECOG:    Goals and Barriers:  Current Goal: Minimize effects of disease.   Barriers: None.      Patient's Capacity to Self Care:  Patient is able to self care.    Code Status: [unfilled]

## 2024-02-01 NOTE — PROGRESS NOTES
Received for port maintenance and lab work. No complaints offered. Port accessed without issue. Flushes well with good blood return. Labs drawn per md order. Pt to schedule next appt prior to leaving today. AVS declined.

## 2024-02-14 ENCOUNTER — TELEPHONE (OUTPATIENT)
Dept: HEMATOLOGY ONCOLOGY | Facility: CLINIC | Age: 74
End: 2024-02-14

## 2024-02-14 ENCOUNTER — TELEPHONE (OUTPATIENT)
Dept: CARDIOLOGY CLINIC | Facility: CLINIC | Age: 74
End: 2024-02-14

## 2024-02-14 DIAGNOSIS — I48.91 ATRIAL FIBRILLATION, UNSPECIFIED TYPE (HCC): ICD-10-CM

## 2024-02-14 DIAGNOSIS — C34.12 MALIGNANT NEOPLASM OF UPPER LOBE OF LEFT LUNG (HCC): ICD-10-CM

## 2024-02-14 NOTE — TELEPHONE ENCOUNTER
Medication Refill Request   Who are you speaking with? Patient   If it is not the patient, are they listed on an active communication consent form?     Yes   Which medication is being requested for refill?  Please list medication name and dosage Sotorasib 120 MG TABS    How many pills does the patient have left? 2 days   Preferred Pharmacy / Address [facility] - [place], [place] - [id number]  [address], [facility] [address]  Phone: [phone]  Fax: [phone]    Who is the prescribing provider? Dr. Sam   Call back number [phone]    Relevant Information refill

## 2024-02-14 NOTE — TELEPHONE ENCOUNTER
Refill sent on 12/19 with 11 refills. Spoke with patient, she reports calling the pharmacy for refill which she stated they do not have an active script. Neema, can you please assist.

## 2024-02-15 NOTE — TELEPHONE ENCOUNTER
Called [facility] and confirmed they received the script, and pt just needs to call to schedule delivery.

## 2024-02-21 ENCOUNTER — OFFICE VISIT (OUTPATIENT)
Dept: PALLIATIVE MEDICINE | Facility: CLINIC | Age: 74
End: 2024-02-21
Payer: MEDICARE

## 2024-02-21 VITALS
BODY MASS INDEX: 30.73 KG/M2 | WEIGHT: 168 LBS | SYSTOLIC BLOOD PRESSURE: 100 MMHG | OXYGEN SATURATION: 98 % | TEMPERATURE: 96 F | DIASTOLIC BLOOD PRESSURE: 64 MMHG | HEART RATE: 74 BPM

## 2024-02-21 DIAGNOSIS — T45.1X5A NEUROPATHY DUE TO CHEMOTHERAPEUTIC DRUG: ICD-10-CM

## 2024-02-21 DIAGNOSIS — M25.50 JOINT PAIN: ICD-10-CM

## 2024-02-21 DIAGNOSIS — Z51.5 PALLIATIVE CARE PATIENT: ICD-10-CM

## 2024-02-21 DIAGNOSIS — G89.3 CANCER RELATED PAIN: Chronic | ICD-10-CM

## 2024-02-21 DIAGNOSIS — G62.0 NEUROPATHY DUE TO CHEMOTHERAPEUTIC DRUG: ICD-10-CM

## 2024-02-21 DIAGNOSIS — K63.9 BOWEL TROUBLE: ICD-10-CM

## 2024-02-21 DIAGNOSIS — K21.9 GASTROESOPHAGEAL REFLUX DISEASE WITHOUT ESOPHAGITIS: ICD-10-CM

## 2024-02-21 DIAGNOSIS — F32.5 MAJOR DEPRESSIVE DISORDER WITH SINGLE EPISODE, IN FULL REMISSION (HCC): ICD-10-CM

## 2024-02-21 DIAGNOSIS — C34.92 ADENOCARCINOMA OF LEFT LUNG, STAGE 4 (HCC): Primary | ICD-10-CM

## 2024-02-21 DIAGNOSIS — F41.9 ANXIETY: ICD-10-CM

## 2024-02-21 PROCEDURE — 99214 OFFICE O/P EST MOD 30 MIN: CPT | Performed by: INTERNAL MEDICINE

## 2024-02-21 RX ORDER — VENLAFAXINE 37.5 MG/1
75 TABLET ORAL
Qty: 180 TABLET | Refills: 3 | Status: SHIPPED | OUTPATIENT
Start: 2024-02-21

## 2024-02-21 RX ORDER — BUSPIRONE HYDROCHLORIDE 7.5 MG/1
7.5 TABLET ORAL
Qty: 90 TABLET | Refills: 2 | Status: SHIPPED | OUTPATIENT
Start: 2024-02-21

## 2024-02-21 RX ORDER — GABAPENTIN 100 MG/1
200 CAPSULE ORAL 2 TIMES DAILY
Qty: 360 CAPSULE | Refills: 3 | Status: SHIPPED | OUTPATIENT
Start: 2024-02-21

## 2024-02-21 NOTE — ASSESSMENT & PLAN NOTE
Most recently, diarrhea. She reports Imodium helps but occasionally causes some constipation. Counseled today on bowel regimen for diarrhea. She has Banatrol at home but has not been using it.  Diarrhea can occur in up to 42% of patients on sotorasib ([email or web address]).

## 2024-02-21 NOTE — PROGRESS NOTES
Follow-up with Palliative and Supportive Care  Melany Griffith 73 y.o. female [id number]    ASSESSMENT & PLAN:    1. Adenocarcinoma of left lung, stage 4 (HCC)  Assessment & Plan:  Stage IV non-small cell lung cancer (diagnosed 2016) w/ osseous metastasis s/p chemotherapy + immunotherapy + RT. Current plan includes systemic therapy with sotorasib.  Patient tolerating treatment well, except for recurrent diarrhea.  Continue disease-directed cares.    Orders:  -     venlafaxine (EFFEXOR) 37.5 mg tablet; Take 2 tablets (75 mg total) by mouth daily at bedtime  -     gabapentin (Neurontin) 100 mg capsule; Take 2 capsules (200 mg total) by mouth 2 (two) times a day  -     busPIRone (BUSPAR) 7.5 mg tablet; Take 1 tablet (7.5 mg total) by mouth daily at bedtime    2. Gastroesophageal reflux disease without esophagitis  Assessment & Plan:  Continue omeprazole.      3. Neuropathy due to chemotherapeutic drug   Assessment & Plan:  Managed. Continue gabapentin, venlafaxine.    Orders:  -     venlafaxine (EFFEXOR) 37.5 mg tablet; Take 2 tablets (75 mg total) by mouth daily at bedtime  -     gabapentin (Neurontin) 100 mg capsule; Take 2 capsules (200 mg total) by mouth 2 (two) times a day    4. Cancer related pain  Assessment & Plan:  Patient reports her chronic polyarthralgia neuropathy are well-managed without needing to rely on opioids. Some of her chronic pain is likely cancer-related.  Continue Tylenol PRN. Patient has been taking 1000 mg PRN, usually about once per day. There is no interaction between sotorasib and Tylenol.  Continue gabapentin.  Venlafaxine may play a role here.      5. Bowel trouble  Assessment & Plan:  Most recently, diarrhea. She reports Imodium helps but occasionally causes some constipation. Counseled today on bowel regimen for diarrhea. She has Banatrol at home but has not been using it.  Diarrhea can occur in up to 42% of patients on sotorasib  "([email or web address]://[email or web address]/drug-monograph/sotorasib).      6. Major depressive disorder with single episode, in full remission ([facility])  Assessment & Plan:  See \"anxiety\" for current plan. Mood stable.    Orders:  -     venlafaxine (EFFEXOR) 37.5 mg tablet; Take 2 tablets (75 mg total) by mouth daily at bedtime  -     gabapentin (Neurontin) 100 mg capsule; Take 2 capsules (200 mg total) by mouth 2 (two) times a day  -     busPIRone (BUSPAR) 7.5 mg tablet; Take 1 tablet (7.5 mg total) by mouth daily at bedtime    7. Anxiety  Assessment & Plan:  Emotional / psychosocial support provided today.  Mood stable. Continue venlafaxine, buspirone.    Orders:  -     venlafaxine (EFFEXOR) 37.5 mg tablet; Take 2 tablets (75 mg total) by mouth daily at bedtime  -     gabapentin (Neurontin) 100 mg capsule; Take 2 capsules (200 mg total) by mouth 2 (two) times a day  -     busPIRone (BUSPAR) 7.5 mg tablet; Take 1 tablet (7.5 mg total) by mouth daily at bedtime    8. Joint pain  Assessment & Plan:  Managed. See \"cancer related pain\".    Orders:  -     venlafaxine (EFFEXOR) 37.5 mg tablet; Take 2 tablets (75 mg total) by mouth daily at bedtime  -     gabapentin (Neurontin) 100 mg capsule; Take 2 capsules (200 mg total) by mouth 2 (two) times a day    9. Palliative care patient  Assessment & Plan:  Emotional / psychosocial support provided today.  ACP: Patient has completed advanced directives (POLST and Power of ). In EMR.  Reviewed notes (Medical Oncology), labs (2/1/24 Cr 1.19, eGFR 44, alb 3.6, Hb 10.8), imaging (12/22/23 PET CT). See below for more data.  Return in about 3 months (around 5/21/2024).  Medication safety issues addressed - no driving under the influence of narcotics (including opioids), watch for adverse effects including AMS or respiratory depression (slowed breathing), keep medications stored in a safe/locked environment, do not use alcohol while opioids or other narcotics are in one's system.  I have " "personally queried the patient's controlled substance dispensing history in the Prescription Drug Monitoring Program in compliance with regulations before I have prescribed any controlled substances. The prescription history is consistent with prescribed therapy and our practice policies.      Orders:  -     venlafaxine (EFFEXOR) 37.5 mg tablet; Take 2 tablets (75 mg total) by mouth daily at bedtime  -     gabapentin (Neurontin) 100 mg capsule; Take 2 capsules (200 mg total) by mouth 2 (two) times a day  -     busPIRone (BUSPAR) 7.5 mg tablet; Take 1 tablet (7.5 mg total) by mouth daily at bedtime        Medications Discontinued During This Encounter   Medication Reason    venlafaxine (EFFEXOR) 37.5 mg tablet Reorder    gabapentin (Neurontin) 100 mg capsule Reorder    busPIRone (BUSPAR) 7.5 mg tablet Reorder       30 minutes were spent in this ambulatory visit with greater than 50% of the time spent face to face with patient in counseling or coordination of care including symptom assessment and management, medication review, psychosocial support, chart review, imaging review, lab review, supportive listening, and anticipatory guidance. All of the patient's questions were answered during this discussion.    SUBJECTIVE:  Chief Complaint   Patient presents with    Cancer    Counseling    Follow-up    Pain    Diarrhea        HPI    Melany Griffith is a 73 y.o. female w/ stage IV non-small cell lung cancer (diagnosed 2016) w/ osseous metastasis s/p chemotherapy + immunotherapy + RT, DM2, HFpEF, Afib, CKD, HTN+HLD, h/o CVA. She follows w/ Annelise Darling PA-C + Dr Sam (Medical Oncology), Dr ROSALEE Fuchs (Radiation Oncology), [facility], Dr Arita (Cardiology), Dr Soto (Neurology). Plan includes systemic therapy w/ sotorasib.    When asked how she has been feeling recently, patient states \"I have been good however I do get diarrhea\". She states she will have loose stools or diarrhea every day, and if she " "has more than 2 bowel movements in the morning she will take Imodium. She states Imodium is effective at controlling this symptom, though at times when she takes Imodium it will cause her to be constipated for a day or 2. She is only taking 1 capsule of Imodium per dose. She has Banatrol at home, but she has not been taking it. She accepts counseling that this may be a good option for her.    Patient feels her mood is stable, managed on her current regimen. She states her sleep is \"good\" appetite is also \"good\". She denies recent nausea, denies recent vomiting. Patient denies significant pain, and feels that Tylenol is effective for when she does experience mild to moderate discomfort. Neuropathy is managed, gabapentin has been effective.    Patient states that she sometimes has issues with weakness, and she \"would like to learn how to get up off the floor\" if she falls. She does not have a fall alert monitoring system, but her brother Patel is always home, and his phone is always on his person, and he would be able to call for assistance if she needs it. She does not feel that ambulatory physical therapy is appropriate for her at this time (though it was offered this visit).    Review of Systems   All other systems reviewed and are negative.    The following portions of the medical history were reviewed: past medical history, surgical history, problem list, medication list, family history, and social history.      Current Outpatient Medications:     acetaminophen (TYLENOL) 500 mg tablet, Take 1-2 tablets (500-1,000 mg total) by mouth 3 (three) times a day as needed for mild pain, headaches or fever, Disp: 540 tablet, Rfl: 3    apixaban (Eliquis) 5 mg, TAKE 1 TABLET TWICE A DAY, Disp: 180 tablet, Rfl: 3    aspirin (ECOTRIN LOW STRENGTH) 81 mg EC tablet, Take 1 tablet (81 mg total) by mouth daily, Disp: 30 tablet, Rfl: 0    atorvastatin (LIPITOR) 40 mg tablet, Take 1 tablet (40 mg total) by mouth daily, Disp: 90 " tablet, Rfl: 1    busPIRone (BUSPAR) 7.5 mg tablet, Take 1 tablet (7.5 mg total) by mouth daily at bedtime, Disp: 90 tablet, Rfl: 2    cyanocobalamin (VITAMIN B-12) 100 mcg tablet, Take by mouth daily, Disp: , Rfl:     folic acid (FOLVITE) 1 mg tablet, Take 1 tablet (1 mg total) by mouth daily, Disp: 90 tablet, Rfl: 1    furosemide (LASIX) 20 mg tablet, Take 2 tablets (40 mg total) by mouth daily as needed (for lower leg swelling), Disp: 60 tablet, Rfl: 0    gabapentin (Neurontin) 100 mg capsule, Take 2 capsules (200 mg total) by mouth 2 (two) times a day, Disp: 360 capsule, Rfl: 3    linaGLIPtin (Tradjenta) 5 MG TABS, Take 5 mg by mouth daily, Disp: 90 tablet, Rfl: 3    loperamide (IMODIUM) 2 mg capsule, Take 2 mg by mouth 4 (four) times a day as needed for diarrhea, Disp: , Rfl:     loratadine (CLARITIN) 10 mg tablet, Take 10 mg by mouth as needed, Disp: , Rfl:     metFORMIN (GLUCOPHAGE) 500 mg tablet, Take 1 tablet (500 mg total) by mouth 2 (two) times a day with meals, Disp: 180 tablet, Rfl: 0    metoprolol tartrate (LOPRESSOR) 25 mg tablet, Take 1 tablet (25 mg total) by mouth every 12 (twelve) hours, Disp: 180 tablet, Rfl: 3    omeprazole (PriLOSEC) 20 mg delayed release capsule, Take 1 capsule (20 mg total) by mouth daily, Disp: 90 capsule, Rfl: 1    potassium chloride (Klor-Con M10) 10 mEq tablet, Take 1 tablet (10 mEq total) by mouth daily, Disp: 90 tablet, Rfl: 3    sotalol (BETAPACE) 80 mg tablet, Take 1 tablet (80 mg total) by mouth 2 (two) times a day, Disp: 180 tablet, Rfl: 3    Sotorasib 120 MG TABS, Take 960 mg by mouth daily, Disp: 240 tablet, Rfl: 11    valsartan (DIOVAN) 160 mg tablet, TAKE 1 TABLET BY MOUTH TWICE A DAY, Disp: 180 tablet, Rfl: 3    venlafaxine (EFFEXOR) 37.5 mg tablet, Take 2 tablets (75 mg total) by mouth daily at bedtime, Disp: 180 tablet, Rfl: 3    sucralfate (CARAFATE) 1 g tablet, Take 1 tablet (1 g total) by mouth 3 (three) times a day for 14 days Crush and mix with 1/4 cup  of water. Drink., Disp: 42 tablet, Rfl: 0    OBJECTIVE:  /64 (BP Location: Left arm, Patient Position: Sitting, Cuff Size: Standard)   Pulse 74   Temp (!) 96 °F (35.6 °C) (Temporal)   Wt 76.2 kg (168 lb)   LMP  (LMP Unknown)   SpO2 98%   BMI 30.73 kg/m²   Physical Exam  Vitals reviewed.   Constitutional:       General: She is not in acute distress.     Appearance: She is well-groomed and overweight. She is not toxic-appearing.   HENT:      Head: Normocephalic and atraumatic.      Right Ear: External ear normal.      Left Ear: External ear normal.   Eyes:      General: No scleral icterus.        Right eye: No discharge.         Left eye: No discharge.      Extraocular Movements: Extraocular movements intact.      Conjunctiva/sclera: Conjunctivae normal.      Pupils: Pupils are equal, round, and reactive to light.   Cardiovascular:      Rate and Rhythm: Normal rate.   Pulmonary:      Effort: Pulmonary effort is normal. No tachypnea, bradypnea, accessory muscle usage or respiratory distress.      Comments: Able to speak comfortably in complete sentences on room air at rest.  Abdominal:      General: There is no distension.      Tenderness: There is no guarding.   Musculoskeletal:      Cervical back: Normal range of motion.   Skin:     General: Skin is dry.      Coloration: Skin is not pale.   Neurological:      Mental Status: She is alert and oriented to person, place, and time.      Cranial Nerves: No dysarthria or facial asymmetry.      Comments: Using no assistive devices for ambulation.   Psychiatric:         Attention and Perception: Attention normal.         Mood and Affect: Mood and affect normal.         Speech: Speech normal.         Behavior: Behavior normal. Behavior is cooperative.         Thought Content: Thought content normal.         Cognition and Memory: Cognition and memory normal.         Judgment: Judgment normal.          Recent labs:  Lab Results   Component Value Date/Time    SODIUM  137 02/01/2024 11:24 AM    K 4.1 02/01/2024 11:24 AM    K 4.0 11/23/2015 06:13 AM    BUN 20 02/01/2024 11:24 AM    BUN 23 11/23/2015 06:13 AM    CREATININE 1.19 02/01/2024 11:24 AM    CREATININE 1.14 11/23/2015 06:13 AM    GLUC 138 02/01/2024 11:24 AM    CALCIUM 9.2 02/01/2024 11:24 AM    CALCIUM 9.2 11/23/2015 06:13 AM    AST 10 (L) 02/01/2024 11:24 AM    AST 20 11/23/2015 06:13 AM    ALT 6 (L) 02/01/2024 11:24 AM    ALT 32 11/23/2015 06:13 AM    ALB 3.6 02/01/2024 11:24 AM    ALB 3.6 11/23/2015 06:13 AM    TP 6.6 02/01/2024 11:24 AM    EGFR 44 02/01/2024 11:24 AM     Lab Results   Component Value Date/Time    HGB 10.8 (L) 02/01/2024 11:24 AM    HGB 14.2 06/17/2015 06:19 AM    WBC 7.16 02/01/2024 11:24 AM    WBC 5.67 06/17/2015 06:19 AM     02/01/2024 11:24 AM     06/17/2015 06:19 AM    INR 1.13 01/03/2022 07:40 PM    INR 2.64 (H) 09/29/2014 06:12 PM    PTT 32 01/03/2022 07:40 PM     Lab Results   Component Value Date/Time    ICN3FSBDFHZD 1.610 04/06/2023 09:55 AM    NRY2VNLJBKVO 2.211 06/17/2015 06:19 AM       Most Recent Imaging [last 120 days]:  Procedure: NM PET CT skull base to mid thigh    Result Date: 12/22/2023  Narrative: PET/CT SCAN INDICATION: C34.12: Malignant neoplasm of upper lobe, left bronchus or lung C34.92: Malignant neoplasm of unspecified part of left bronchus or lung, radiation right paraspinal region 9/26/2023, restaging, history of endometrial cancer, status post RICHARD/BSO 2000 MODIFIER: PS COMPARISON: PET/CT 7/28/2023 and priors CELL TYPE: Adenocarcinoma TECHNIQUE:   10.4 mCi F-18-FDG administered IV. Multiplanar attenuation corrected and non attenuation corrected PET images are available for interpretation, and contiguous, low dose, axial CT sections were obtained from the skull base through the femurs.  Intravenous contrast material was not utilized. This examination, like all CT scans performed in the [facility], was performed utilizing techniques to minimize  radiation dose exposure, including the use of iterative reconstruction and automated exposure control. Fasting serum glucose: 129 mg/dl FINDINGS: VISUALIZED BRAIN: No acute abnormalities are seen. HEAD/NECK: There is a physiologic distribution of FDG. No FDG avid cervical adenopathy is seen. CT images: Left ethmoid sinus mucosal thickening. CHEST: Stable appearance of left upper lung consolidation with mild FDG activity, SUV 1.9, prior SUV 2.3, favoring posttreatment changes. Right lower medial lung mass demonstrates decreased but persistent FDG activity, SUV 6.1, prior SUV 10. There is likely residual viable tumor. This measures approximately 3 x 2.5 x 3.7 cm based on the PET images, prior measurement 4 x 4 x 5.1 cm when measured in a similar fashion. Adjacent airspace consolidation, SUV 3.3, likely represents posttreatment inflammatory changes. Mild uptake in the left anterior chest wall is not significantly changed, SUV 2.3, prior to be 2.3. This is at the level of chronic nondisplaced left anterior third rib fracture. Otherwise no new hypermetabolic lesions. No new hypermetabolic hilar or mediastinal adenopathy. CT images: Right chest wall port. Coronary atherosclerosis. ABDOMEN: No FDG avid soft tissue lesions are seen. Bowel activity appears physiologic. CT images: Cholecystectomy. Stable small left hepatic hypodensity. Colon diverticula. Known pancreatic cysts better evaluated on prior MRI. PELVIS: No FDG avid soft tissue lesions are seen. CT images: Hysterectomy. OSSEOUS STRUCTURES: No FDG avid lesions are seen. CT images: Spine degenerative change. Lumbar levoscoliosis. Old left anterior nondisplaced rib fracture.     Impression: 1. Decreased size and FDG activity of right lower medial lung mass, however residual viable tumor likely remains. Adjacent posttreatment inflammatory changes. 2. Stable appearance of posttreatment changes in the left upper lung. 3. No new hypermetabolic metastases. 4. Stable mild  "uptake in the left anterior chest wall. Correlate with clinical symptoms. Workstation performed: [id number]      Giles Orr MD  [facility] Palliative and Supportive Care  [phone]    Portions of this document may have been created using dictation software and as such some \"sound alike\" terms may have been generated by the system. Do not hesitate to contact me with any questions or clarifications.   "

## 2024-02-21 NOTE — ASSESSMENT & PLAN NOTE
Emotional / psychosocial support provided today.  ACP: Patient has completed advanced directives (POLST and Power of ). In EMR.  Reviewed notes (Medical Oncology), labs (2/1/24 Cr 1.19, eGFR 44, alb 3.6, Hb 10.8), imaging (12/22/23 PET CT). See below for more data.  Return in about 3 months (around 5/21/2024).  Medication safety issues addressed - no driving under the influence of narcotics (including opioids), watch for adverse effects including AMS or respiratory depression (slowed breathing), keep medications stored in a safe/locked environment, do not use alcohol while opioids or other narcotics are in one's system.  I have personally queried the patient's controlled substance dispensing history in the Prescription Drug Monitoring Program in compliance with regulations before I have prescribed any controlled substances. The prescription history is consistent with prescribed therapy and our practice policies.

## 2024-02-21 NOTE — ASSESSMENT & PLAN NOTE
Patient reports her chronic polyarthralgia neuropathy are well-managed without needing to rely on opioids. Some of her chronic pain is likely cancer-related.  Continue Tylenol PRN. Patient has been taking 1000 mg PRN, usually about once per day. There is no interaction between sotorasib and Tylenol.  Continue gabapentin.  Venlafaxine may play a role here.

## 2024-02-21 NOTE — ASSESSMENT & PLAN NOTE
Stage IV non-small cell lung cancer (diagnosed 2016) w/ osseous metastasis s/p chemotherapy + immunotherapy + RT. Current plan includes systemic therapy with sotorasib.  Patient tolerating treatment well, except for recurrent diarrhea.  Continue disease-directed cares.

## 2024-02-21 NOTE — PATIENT INSTRUCTIONS
It was good to see you today. Thank you for coming in.    If diarrhea is an issue:  Try Banatrol (banana flakes). Use as directed / as-needed for diarrhea. This is something you may safely take every day. If you become constipated you may wish to stop using it, although it can treat both constipation and diarrhea in some patients. This available over-the-counter at some pharmacies, but you may have more success looking online ([employer], amazon.com).  Try a probiotic. This could be yogurt or kefir, or fermented beverages such as kombucha, but probiotics are also available in capsule form. Aim for 10-15 billion colony-forming-units, w/ bacteria such as Lactobacillus / Saccharomyces / Actinomyces.  Stay hydrated!  If needed, it is okay to take Imodium for diarrhea. Use as directed, available over-the-counter.  For shoulder or other pain:  Tylenol, 1000mg three times per day every day, can be a safe and effective way to reduce chronic pain.  You may consider topical products for pain as well (over-the-counter lidocaine, CBD, capsaicin +/- menthol, diclofenac). Brand names include Salonpas, Biofreeze, Aspercreme, Icy Hot, Voltaren, etc. These can be patches, creams, lotions, or roll-on gels.  Try a heating pad or ice pack (you can alternate these about 30 minutes apart) for neck and back pain. Do not use a heating pad for more than 20 minutes out of any single hour.  Do not use topical product under a heating pad; ensure skin is clean and dry.  Continue other medications.  Return in about 3 months (around 5/21/2024).  Call us for refills on medications that we supply, as needed.   If something changes and you need to come in sooner, please call our office.    PRESCRIPTION REFILL REMINDER:  All medication refills should be requested prior to Noon on Friday. Any refill requests after noon on Friday would be addressed the following Monday.    MEDICATION SAFETY ISSUES:  Do not drive under the influence of narcotics  (including opioids), watch for adverse effects including confusion / altered mental status / respiratory depression (slowed breathing), keep medications stored in a safe/locked environment, do not use alcohol while opioids or other narcotics are in your system.

## 2024-02-26 ENCOUNTER — TELEPHONE (OUTPATIENT)
Dept: HEMATOLOGY ONCOLOGY | Facility: CLINIC | Age: 74
End: 2024-02-26

## 2024-02-26 NOTE — TELEPHONE ENCOUNTER
Returned call to patient.  She states she has been experiencing neck pain for the past few days.  She denies redness, swelling or trauma to the area.  Pain is on the right side of her neck.  She states it feel muscular.  When she moves a certain way she experiences severe pain.  She is unsure if she pulled a muscle?  Pain is not near her port.  She has been using Tylenol PRN with minimal relief  I suggested she reach out to her PCP for evaluation/recommendation if pain does not resolve.  She was agreeable and verbalized understanding

## 2024-02-26 NOTE — TELEPHONE ENCOUNTER
Patient Call    Who are you speaking with? Patient    If it is not the patient, are they listed on an active communication consent form? N/A   What is the reason for this call? Patient calling in regards to symptoms she is experiencing.  Patient has a port and states that for the last week she has had pain in her neck, patient states it feels like a pulled muscle.  Patient is concerned about a possible blood clot around her port.  Patient would like a call back to discuss her symptoms.       Does this require a call back? Yes   If a call back is required, please list best call back number [phone]   If a call back is required, advise that a message will be forwarded to their care team and someone will return their call as soon as possible.   Did you relay this information to the patient? Yes

## 2024-02-27 ENCOUNTER — OFFICE VISIT (OUTPATIENT)
Dept: FAMILY MEDICINE CLINIC | Facility: CLINIC | Age: 74
End: 2024-02-27
Payer: MEDICARE

## 2024-02-27 VITALS
SYSTOLIC BLOOD PRESSURE: 118 MMHG | DIASTOLIC BLOOD PRESSURE: 74 MMHG | OXYGEN SATURATION: 98 % | HEIGHT: 62 IN | BODY MASS INDEX: 30.91 KG/M2 | WEIGHT: 168 LBS | TEMPERATURE: 97.8 F | HEART RATE: 68 BPM | RESPIRATION RATE: 16 BRPM

## 2024-02-27 DIAGNOSIS — C79.52 SECONDARY MALIGNANT NEOPLASM OF BONE AND BONE MARROW (HCC): ICD-10-CM

## 2024-02-27 DIAGNOSIS — N18.4 STAGE 4 CHRONIC KIDNEY DISEASE (HCC): ICD-10-CM

## 2024-02-27 DIAGNOSIS — I48.0 PAROXYSMAL ATRIAL FIBRILLATION (HCC): ICD-10-CM

## 2024-02-27 DIAGNOSIS — D70.1 CHEMOTHERAPY INDUCED NEUTROPENIA: ICD-10-CM

## 2024-02-27 DIAGNOSIS — I50.32 CHRONIC DIASTOLIC HEART FAILURE (HCC): ICD-10-CM

## 2024-02-27 DIAGNOSIS — E11.22 TYPE 2 DIABETES MELLITUS WITH STAGE 3 CHRONIC KIDNEY DISEASE, WITHOUT LONG-TERM CURRENT USE OF INSULIN, UNSPECIFIED WHETHER STAGE 3A OR 3B CKD (HCC): ICD-10-CM

## 2024-02-27 DIAGNOSIS — C79.51 SECONDARY MALIGNANT NEOPLASM OF BONE AND BONE MARROW (HCC): ICD-10-CM

## 2024-02-27 DIAGNOSIS — T45.1X5A CHEMOTHERAPY INDUCED NEUTROPENIA: ICD-10-CM

## 2024-02-27 DIAGNOSIS — N18.30 TYPE 2 DIABETES MELLITUS WITH STAGE 3 CHRONIC KIDNEY DISEASE, WITHOUT LONG-TERM CURRENT USE OF INSULIN, UNSPECIFIED WHETHER STAGE 3A OR 3B CKD (HCC): ICD-10-CM

## 2024-02-27 DIAGNOSIS — M62.838 NECK MUSCLE SPASM: Primary | ICD-10-CM

## 2024-02-27 PROCEDURE — 99214 OFFICE O/P EST MOD 30 MIN: CPT | Performed by: FAMILY MEDICINE

## 2024-02-27 RX ORDER — METHOCARBAMOL 500 MG/1
500 TABLET, FILM COATED ORAL 3 TIMES DAILY
Qty: 20 TABLET | Refills: 0 | Status: SHIPPED | OUTPATIENT
Start: 2024-02-27

## 2024-02-27 NOTE — PROGRESS NOTES
Assessment/Plan:    1. Neck muscle spasm  Assessment & Plan:  Heat, tylenol and robaxin    Orders:  -     methocarbamol (ROBAXIN) 500 mg tablet; Take 1 tablet (500 mg total) by mouth 3 (three) times a day    2. 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 Chemotherapy induced neutropenia   Assessment & Plan:  Getting labs done      4. Stage 4 chronic kidney disease (HCC)  Assessment & Plan:  Lab Results   Component Value Date    EGFR 44 02/01/2024    EGFR 36 10/12/2023    EGFR 51 09/27/2023    CREATININE 1.19 02/01/2024    CREATININE 1.42 (H) 10/12/2023    CREATININE 1.06 09/27/2023   Due to chemo      5. Chronic diastolic heart failure (HCC)  Assessment & Plan:  Wt Readings from Last 3 Encounters:   02/27/24 76.2 kg (168 lb)   02/21/24 76.2 kg (168 lb)   02/01/24 77.6 kg (171 lb)       Seeing cardiology          6. Paroxysmal atrial fibrillation (HCC)  Assessment & Plan:  On eliquis      7. Type 2 diabetes mellitus with stage 3 chronic kidney disease, without long-term current use of insulin, unspecified whether stage 3a or 3b CKD (HCC)  Assessment & Plan:  On metformin  Lab Results   Component Value Date    HGBA1C 7.2 (H) 08/31/2023               There are no Patient Instructions on file for this visit.    Return for Recheck.    Subjective:      Patient ID: Melany Griffith is a 73 y.o. female.    Chief Complaint   Patient presents with   • Neck Pain     Patient being seen for neck pain- right side x 1 week        Here for pain in her neck  Near port  A few weeks ago had it flushed  1 week ago  Tylenol take the edge     Neck Pain   This is a new problem. The current episode started in the past 7 days. The problem occurs constantly. The pain is associated with nothing. The pain is at a severity of 6/10. The pain is moderate. Nothing aggravates the symptoms. The pain is Worse during the day. Stiffness is present In the morning.       The following  portions of the patient's history were reviewed and updated as appropriate: allergies, current medications, past family history, past medical history, past social history, past surgical history and problem list.    Review of Systems   Constitutional:  Positive for fatigue.   Gastrointestinal:  Positive for diarrhea.   Musculoskeletal:  Positive for neck pain.         Current Outpatient Medications   Medication Sig Dispense Refill   • acetaminophen (TYLENOL) 500 mg tablet Take 1-2 tablets (500-1,000 mg total) by mouth 3 (three) times a day as needed for mild pain, headaches or fever 540 tablet 3   • apixaban (Eliquis) 5 mg TAKE 1 TABLET TWICE A  tablet 3   • aspirin (ECOTRIN LOW STRENGTH) 81 mg EC tablet Take 1 tablet (81 mg total) by mouth daily 30 tablet 0   • atorvastatin (LIPITOR) 40 mg tablet Take 1 tablet (40 mg total) by mouth daily 90 tablet 1   • busPIRone (BUSPAR) 7.5 mg tablet Take 1 tablet (7.5 mg total) by mouth daily at bedtime 90 tablet 2   • cyanocobalamin (VITAMIN B-12) 100 mcg tablet Take by mouth daily     • folic acid (FOLVITE) 1 mg tablet Take 1 tablet (1 mg total) by mouth daily 90 tablet 1   • furosemide (LASIX) 20 mg tablet Take 2 tablets (40 mg total) by mouth daily as needed (for lower leg swelling) 60 tablet 0   • gabapentin (Neurontin) 100 mg capsule Take 2 capsules (200 mg total) by mouth 2 (two) times a day 360 capsule 3   • linaGLIPtin (Tradjenta) 5 MG TABS Take 5 mg by mouth daily 90 tablet 3   • loperamide (IMODIUM) 2 mg capsule Take 2 mg by mouth 4 (four) times a day as needed for diarrhea     • loratadine (CLARITIN) 10 mg tablet Take 10 mg by mouth as needed     • metFORMIN (GLUCOPHAGE) 500 mg tablet Take 1 tablet (500 mg total) by mouth 2 (two) times a day with meals 180 tablet 0   • methocarbamol (ROBAXIN) 500 mg tablet Take 1 tablet (500 mg total) by mouth 3 (three) times a day 20 tablet 0   • metoprolol tartrate (LOPRESSOR) 25 mg tablet Take 1 tablet (25 mg total) by  "mouth every 12 (twelve) hours 180 tablet 3   • omeprazole (PriLOSEC) 20 mg delayed release capsule Take 1 capsule (20 mg total) by mouth daily 90 capsule 1   • potassium chloride (Klor-Con M10) 10 mEq tablet Take 1 tablet (10 mEq total) by mouth daily 90 tablet 3   • sotalol (BETAPACE) 80 mg tablet Take 1 tablet (80 mg total) by mouth 2 (two) times a day 180 tablet 3   • Sotorasib 120 MG TABS Take 960 mg by mouth daily 240 tablet 11   • valsartan (DIOVAN) 160 mg tablet TAKE 1 TABLET BY MOUTH TWICE A  tablet 3   • venlafaxine (EFFEXOR) 37.5 mg tablet Take 2 tablets (75 mg total) by mouth daily at bedtime 180 tablet 3     No current facility-administered medications for this visit.       Objective:    /74 (BP Location: Left arm, Patient Position: Sitting, Cuff Size: Standard)   Pulse 68   Temp 97.8 °F (36.6 °C) (Tympanic)   Resp 16   Ht 5' 2\" (1.575 m)   Wt 76.2 kg (168 lb)   LMP  (LMP Unknown)   SpO2 98%   BMI 30.73 kg/m²        Physical Exam  Vitals and nursing note reviewed.   Constitutional:       Appearance: Normal appearance.   HENT:      Head: Normocephalic and atraumatic.   Musculoskeletal:         General: Tenderness and deformity present.   Neurological:      General: No focal deficit present.      Mental Status: She is alert and oriented to person, place, and time.   Psychiatric:         Mood and Affect: Mood normal.         Behavior: Behavior normal.         Thought Content: Thought content normal.         Judgment: Judgment normal.                Salina Dwyer DO"

## 2024-02-27 NOTE — ASSESSMENT & PLAN NOTE
Lab Results   Component Value Date    EGFR 44 02/01/2024    EGFR 36 10/12/2023    EGFR 51 09/27/2023    CREATININE 1.19 02/01/2024    CREATININE 1.42 (H) 10/12/2023    CREATININE 1.06 09/27/2023   Due to chemo

## 2024-02-27 NOTE — ASSESSMENT & PLAN NOTE
Wt Readings from Last 3 Encounters:   02/27/24 76.2 kg (168 lb)   02/21/24 76.2 kg (168 lb)   02/01/24 77.6 kg (171 lb)       Seeing cardiology

## 2024-03-11 DIAGNOSIS — M79.10 MYALGIA DUE TO STATIN: ICD-10-CM

## 2024-03-11 DIAGNOSIS — E11.9 CONTROLLED TYPE 2 DIABETES MELLITUS WITHOUT COMPLICATION, WITHOUT LONG-TERM CURRENT USE OF INSULIN (HCC): ICD-10-CM

## 2024-03-11 DIAGNOSIS — T46.6X5A MYALGIA DUE TO STATIN: ICD-10-CM

## 2024-03-11 RX ORDER — ATORVASTATIN CALCIUM 40 MG/1
40 TABLET, FILM COATED ORAL DAILY
Qty: 90 TABLET | Refills: 1 | Status: SHIPPED | OUTPATIENT
Start: 2024-03-11

## 2024-03-25 ENCOUNTER — TELEPHONE (OUTPATIENT)
Dept: NEUROLOGY | Facility: CLINIC | Age: 74
End: 2024-03-25

## 2024-04-24 ENCOUNTER — TELEPHONE (OUTPATIENT)
Age: 74
End: 2024-04-24

## 2024-04-24 NOTE — TELEPHONE ENCOUNTER
Spoke with patient, she stated she has 2 episodes of diarrhea in the AM, after each episode she takes an imodium and it subsides but she is now starting to have a decreased appetite. Patient denies any recent diet changes. I reviewed medication list, patient does take a lot of medications at bedtime but none of them are new to her. I did inform patient we do recommend that she comes in for a visit but is declining at this time. Informed her PCP is out of the office and I will send the message to the covering provider.

## 2024-04-24 NOTE — TELEPHONE ENCOUNTER
Patient called and states has been having diarrhea twice in the morning for 2 weeks now. Takes immodium and does help but is feeling washed out. Offered appointment and patient states does not want to come out. Would like a call [phone]

## 2024-04-25 NOTE — TELEPHONE ENCOUNTER
Spoke with patient and will reach out to Hematology to evaluate her symptoms and reach out to her PCP for further instructions.

## 2024-04-26 ENCOUNTER — APPOINTMENT (EMERGENCY)
Dept: CT IMAGING | Facility: HOSPITAL | Age: 74
End: 2024-04-26
Payer: MEDICARE

## 2024-04-26 ENCOUNTER — HOSPITAL ENCOUNTER (EMERGENCY)
Facility: HOSPITAL | Age: 74
Discharge: HOME | End: 2024-04-27
Attending: EMERGENCY MEDICINE
Payer: MEDICARE

## 2024-04-26 VITALS
SYSTOLIC BLOOD PRESSURE: 126 MMHG | TEMPERATURE: 97.7 F | HEART RATE: 84 BPM | RESPIRATION RATE: 20 BRPM | WEIGHT: 159.39 LBS | OXYGEN SATURATION: 99 % | DIASTOLIC BLOOD PRESSURE: 63 MMHG | BODY MASS INDEX: 29.15 KG/M2

## 2024-04-26 DIAGNOSIS — E83.42 HYPOMAGNESEMIA: ICD-10-CM

## 2024-04-26 DIAGNOSIS — K52.9 ENTERITIS: ICD-10-CM

## 2024-04-26 DIAGNOSIS — D64.9 ANEMIA: ICD-10-CM

## 2024-04-26 DIAGNOSIS — C34.90 LUNG CANCER (HCC): Primary | ICD-10-CM

## 2024-04-26 LAB
ALBUMIN SERPL BCP-MCNC: 3.1 G/DL (ref 3.5–5)
ALP SERPL-CCNC: 188 U/L (ref 34–104)
ALT SERPL W P-5'-P-CCNC: 11 U/L (ref 7–52)
ANION GAP SERPL CALCULATED.3IONS-SCNC: 5 MMOL/L (ref 4–13)
AST SERPL W P-5'-P-CCNC: 16 U/L (ref 13–39)
BASOPHILS # BLD AUTO: 0.05 THOUSANDS/ÂΜL (ref 0–0.1)
BASOPHILS NFR BLD AUTO: 0 % (ref 0–1)
BILIRUB SERPL-MCNC: 0.35 MG/DL (ref 0.2–1)
BUN SERPL-MCNC: 19 MG/DL (ref 5–25)
CALCIUM ALBUM COR SERPL-MCNC: 9.5 MG/DL (ref 8.3–10.1)
CALCIUM SERPL-MCNC: 8.8 MG/DL (ref 8.4–10.2)
CHLORIDE SERPL-SCNC: 104 MMOL/L (ref 96–108)
CO2 SERPL-SCNC: 23 MMOL/L (ref 21–32)
CREAT SERPL-MCNC: 0.96 MG/DL (ref 0.6–1.3)
EOSINOPHIL # BLD AUTO: 0.05 THOUSAND/ÂΜL (ref 0–0.61)
EOSINOPHIL NFR BLD AUTO: 0 % (ref 0–6)
ERYTHROCYTE [DISTWIDTH] IN BLOOD BY AUTOMATED COUNT: 15.9 % (ref 11.6–15.1)
GFR SERPL CREATININE-BSD FRML MDRD: 58 ML/MIN/1.73SQ M
GLUCOSE SERPL-MCNC: 123 MG/DL (ref 65–140)
HCT VFR BLD AUTO: 29.6 % (ref 34.8–46.1)
HGB BLD-MCNC: 8.6 G/DL (ref 11.5–15.4)
IMM GRANULOCYTES # BLD AUTO: 0.06 THOUSAND/UL (ref 0–0.2)
IMM GRANULOCYTES NFR BLD AUTO: 1 % (ref 0–2)
LYMPHOCYTES # BLD AUTO: 0.73 THOUSANDS/ÂΜL (ref 0.6–4.47)
LYMPHOCYTES NFR BLD AUTO: 6 % (ref 14–44)
MAGNESIUM SERPL-MCNC: 1.2 MG/DL (ref 1.9–2.7)
MCH RBC QN AUTO: 22.6 PG (ref 26.8–34.3)
MCHC RBC AUTO-ENTMCNC: 29.1 G/DL (ref 31.4–37.4)
MCV RBC AUTO: 78 FL (ref 82–98)
MONOCYTES # BLD AUTO: 0.61 THOUSAND/ÂΜL (ref 0.17–1.22)
MONOCYTES NFR BLD AUTO: 5 % (ref 4–12)
NEUTROPHILS # BLD AUTO: 10.63 THOUSANDS/ÂΜL (ref 1.85–7.62)
NEUTS SEG NFR BLD AUTO: 88 % (ref 43–75)
NRBC BLD AUTO-RTO: 0 /100 WBCS
PLATELET # BLD AUTO: 373 THOUSANDS/UL (ref 149–390)
PMV BLD AUTO: 10.1 FL (ref 8.9–12.7)
POTASSIUM SERPL-SCNC: 3.6 MMOL/L (ref 3.5–5.3)
PROT SERPL-MCNC: 6.2 G/DL (ref 6.4–8.4)
RBC # BLD AUTO: 3.81 MILLION/UL (ref 3.81–5.12)
SODIUM SERPL-SCNC: 132 MMOL/L (ref 135–147)
WBC # BLD AUTO: 12.13 THOUSAND/UL (ref 4.31–10.16)

## 2024-04-26 PROCEDURE — 85025 COMPLETE CBC W/AUTO DIFF WBC: CPT

## 2024-04-26 PROCEDURE — 96365 THER/PROPH/DIAG IV INF INIT: CPT

## 2024-04-26 PROCEDURE — 83735 ASSAY OF MAGNESIUM: CPT

## 2024-04-26 PROCEDURE — 71260 CT THORAX DX C+: CPT

## 2024-04-26 PROCEDURE — 99284 EMERGENCY DEPT VISIT MOD MDM: CPT

## 2024-04-26 PROCEDURE — 36415 COLL VENOUS BLD VENIPUNCTURE: CPT

## 2024-04-26 PROCEDURE — 80053 COMPREHEN METABOLIC PANEL: CPT

## 2024-04-26 PROCEDURE — 96361 HYDRATE IV INFUSION ADD-ON: CPT

## 2024-04-26 PROCEDURE — 99285 EMERGENCY DEPT VISIT HI MDM: CPT | Performed by: EMERGENCY MEDICINE

## 2024-04-26 PROCEDURE — 74177 CT ABD & PELVIS W/CONTRAST: CPT

## 2024-04-26 PROCEDURE — 96366 THER/PROPH/DIAG IV INF ADDON: CPT

## 2024-04-26 RX ORDER — MAGNESIUM SULFATE HEPTAHYDRATE 40 MG/ML
2 INJECTION, SOLUTION INTRAVENOUS ONCE
Status: COMPLETED | OUTPATIENT
Start: 2024-04-26 | End: 2024-04-26

## 2024-04-26 RX ORDER — MAGNESIUM SULFATE HEPTAHYDRATE 40 MG/ML
2 INJECTION, SOLUTION INTRAVENOUS ONCE
Status: COMPLETED | OUTPATIENT
Start: 2024-04-26 | End: 2024-04-27

## 2024-04-26 RX ADMIN — IOHEXOL 100 ML: 350 INJECTION, SOLUTION INTRAVENOUS at 19:55

## 2024-04-26 RX ADMIN — MAGNESIUM SULFATE HEPTAHYDRATE 2 G: 40 INJECTION, SOLUTION INTRAVENOUS at 19:25

## 2024-04-26 RX ADMIN — MAGNESIUM SULFATE HEPTAHYDRATE 2 G: 40 INJECTION, SOLUTION INTRAVENOUS at 22:28

## 2024-04-26 RX ADMIN — SODIUM CHLORIDE 1000 ML: 0.9 INJECTION, SOLUTION INTRAVENOUS at 18:32

## 2024-04-26 NOTE — ED ATTENDING ATTESTATION
4/26/2024  I, Tobias Dennison MD, saw and evaluated the patient. I have discussed the patient with the resident/non-physician practitioner and agree with the resident's/non-physician practitioner's findings, Plan of Care, and MDM as documented in the resident's/non-physician practitioner's note, except where noted. All available labs and Radiology studies were reviewed.  I was present for key portions of any procedure(s) performed by the resident/non-physician practitioner and I was immediately available to provide assistance.       At this point I agree with the current assessment done in the Emergency Department.  I have conducted an independent evaluation of this patient a history and physical is as follows:    73-year-old female with a history of metastatic lung carcinoma presented for evaluation of ongoing loose stools every morning as well as loss of appetite and some weight loss.  She denies any chest pain, difficulty breathing, fevers, chills.  She typically takes 2 Imodium in the morning after loose stools and has normal bowel movements rest of the day.  This has been working well for her.  No abdominal pain.  No vomiting.  No blood in stool.    Appears pale.  Abdomen soft, nontender here.  Oropharynx remains moist.  Stool guaiac negative.    ED Course  ED Course as of 04/26/24 2329 Fri Apr 26, 2024   1853 MCV(!): 78   1853 Hemoglobin(!): 8.6  Microcytic anemia down from hemoglobin about 11 two months ago.   1901 MAGNESIUM(!): 1.2   1901 Potassium: 3.6  Lower than baseline likely secondary to diarrhea.   1901 Sodium(!): 132   2252 CT shows:    4.8 cm low-attenuation focus with rim enhancement at the medial right lower lobe at site of previously treated previously treated lung mass. This finding may reflect recurrence of patient's previously treated lung mass and/or superinfection and   developing abscess. Oncology consult and with possible tissue sampling recommended     Findings of mild enteritis      Decreasing size of pancreatic cystic lesion     2328 Patient prefers to be discharged.  Recommending follow-up with her oncologist regarding acute anemia and electrolyte disturbances and her generalized loss of appetite and subsequent weight loss.  She can continue using Imodium as needed which seems to be helping her appropriately.         Critical Care Time  Procedures

## 2024-04-26 NOTE — ED PROVIDER NOTES
"History  Chief Complaint   Patient presents with    Diarrhea     Pt coming from home c/o of having two episodes of diarrhea a day for the last two weeks. Pt states its a yellowish color. Pt feels tired \"flushed\" as of late. Pt is visually pale in triage.       73-year-old female with history of left lung adenocarcinoma with metastasis, on daily oral chemotherapy, presents today for evaluation of diarrhea.  Patient states that she has 2 episodes of nonbloody watery yellow diarrhea every morning for the past 2 weeks.  Has been taking Imodium every day and does not have any recurrent episodes the rest of the day.  Does have history of diarrhea associated with oral chemotherapy agents.  Denies any fevers, abdominal pain, nausea, vomiting, urinary symptoms.  Denies chest pain or shortness of breath.  States began feeling more fatigued today which brought her in for evaluation.  She does report a decreased appetite with a 10 pound unintentional weight loss in the past 2 weeks.  She also has a history of C. difficile several months ago but reports complete resolution.  Denies any recent antibiotic use, travel outside the country or recent hospitalizations.      Diarrhea  Associated symptoms: no abdominal pain, no arthralgias, no chills, no fever and no vomiting        Prior to Admission Medications   Prescriptions Last Dose Informant Patient Reported? Taking?   Sotorasib 120 MG TABS   No No   Sig: Take 960 mg by mouth daily   acetaminophen (TYLENOL) 500 mg tablet  Self No No   Sig: Take 1-2 tablets (500-1,000 mg total) by mouth 3 (three) times a day as needed for mild pain, headaches or fever   apixaban (Eliquis) 5 mg   No No   Sig: TAKE 1 TABLET TWICE A DAY   aspirin (ECOTRIN LOW STRENGTH) 81 mg EC tablet  Self No No   Sig: Take 1 tablet (81 mg total) by mouth daily   atorvastatin (LIPITOR) 40 mg tablet   No No   Sig: Take 1 tablet (40 mg total) by mouth daily   busPIRone (BUSPAR) 7.5 mg tablet   No No   Sig: Take 1 " tablet (7.5 mg total) by mouth daily at bedtime   cyanocobalamin (VITAMIN B-12) 100 mcg tablet  Self Yes No   Sig: Take by mouth daily   folic acid (FOLVITE) 1 mg tablet  Self No No   Sig: Take 1 tablet (1 mg total) by mouth daily   furosemide (LASIX) 20 mg tablet  Self No No   Sig: Take 2 tablets (40 mg total) by mouth daily as needed (for lower leg swelling)   gabapentin (Neurontin) 100 mg capsule   No No   Sig: Take 2 capsules (200 mg total) by mouth 2 (two) times a day   linaGLIPtin (Tradjenta) 5 MG TABS   No No   Sig: Take 5 mg by mouth daily   loperamide (IMODIUM) 2 mg capsule  Self Yes No   Sig: Take 2 mg by mouth 4 (four) times a day as needed for diarrhea   loratadine (CLARITIN) 10 mg tablet  Self Yes No   Sig: Take 10 mg by mouth as needed   metFORMIN (GLUCOPHAGE) 500 mg tablet   No No   Sig: Take 1 tablet (500 mg total) by mouth 2 (two) times a day with meals   methocarbamol (ROBAXIN) 500 mg tablet   No No   Sig: Take 1 tablet (500 mg total) by mouth 3 (three) times a day   metoprolol tartrate (LOPRESSOR) 25 mg tablet   No No   Sig: Take 1 tablet (25 mg total) by mouth every 12 (twelve) hours   omeprazole (PriLOSEC) 20 mg delayed release capsule   No No   Sig: Take 1 capsule (20 mg total) by mouth daily   potassium chloride (Klor-Con M10) 10 mEq tablet  Self No No   Sig: Take 1 tablet (10 mEq total) by mouth daily   sotalol (BETAPACE) 80 mg tablet   No No   Sig: Take 1 tablet (80 mg total) by mouth 2 (two) times a day   valsartan (DIOVAN) 160 mg tablet  Self No No   Sig: TAKE 1 TABLET BY MOUTH TWICE A DAY   venlafaxine (EFFEXOR) 37.5 mg tablet   No No   Sig: Take 2 tablets (75 mg total) by mouth daily at bedtime      Facility-Administered Medications: None       Past Medical History:   Diagnosis Date    A-fib (HCC)     Arthritis     Atrial fibrillation (HCC)     Confusion     Diabetes mellitus (HCC)     Diabetes mellitus type 2, uncomplicated (HCC)     Last assessed: 8/17/17    Encephalopathy 1/6/2022     Essential hypertension     Frozen shoulder     L shoulder    GERD (gastroesophageal reflux disease)     Hx of cancer of uterus     Last assessed: 8/21/15    Hyperlipidemia     Hypertension     Hyponatremia 11/16/2016    Lung mass     diagnosed 9/2016    Malignant neoplasm without specification of site (HCC)     Skin cancer, basal cell     right eye area    Stage 4 lung cancer (HCC)     Thrombocytopenia, unspecified (HCC) 1/20/2023       Past Surgical History:   Procedure Laterality Date    APPENDECTOMY      BRONCHOSCOPY N/A 11/17/2016    Procedure: BRONCHOSCOPY FLEXIBLE;  Surgeon: Giles Alonso MD;  Location: [facility];  Service:     CHOLECYSTECTOMY      COLONOSCOPY      COLONOSCOPY      FL GUIDED CENTRAL VENOUS ACCESS DEVICE INSERTION  12/14/2021    GALLBLADDER SURGERY      HYSTERECTOMY  2000    Total abdominal    JOINT REPLACEMENT      LARYNGOSCOPY      Flexible Fiberoptic, (Therapeutic), Resolved: 11/17/16    MOHS SURGERY      Micrographic Surgery Face    KS BRNCHSC INCL FLUOR GDNCE DX W/CELL WASHG SPX N/A 5/24/2017    Procedure: BRONCHOSCOPY FLEXIBLE;  Surgeon: Denzel Manning MD;  Location: [facility];  Service: Pulmonary    KS BRONCHOSCOPY NEEDLE BX TRACHEA MAIN STEM&/BRON N/A 11/17/2016    Procedure: EBUS; FROZEN SECTION ;  Surgeon: Giles Alonso MD;  Location: [facility];  Service: Thoracic    KS INSJ TUNNELED CTR VAD W/SUBQ PORT AGE 5 YR/> N/A 12/14/2021    Procedure: INSERTION VENOUS PORT ( PORT-A-CATH) IR;  Surgeon: Hansel Garduno DO;  [facility];  Service: Interventional Radiology    TONSILECTOMY AND ADNOIDECTOMY      TONSILLECTOMY      TOTAL KNEE ARTHROPLASTY Right 09/23/2014       Family History   Problem Relation Age of Onset    Heart disease Father         cardiac disorder    Hypertension Father     Arthritis Father     Stroke Father         cerebrovascular accident    Skin cancer Father     Diabetes Mother     Heart disease Mother     Dementia Mother     Hypertension Mother      Thyroid disease Mother     Cancer Maternal Grandfather         of unknown origin    Cancer Family     Depression Family     Diabetes Family     Hyperlipidemia Family         essential    Heart disease Family     Hypertension Family     Stroke Family         syndrome    Lung cancer Paternal Uncle     Muscular dystrophy Brother      I have reviewed and agree with the history as documented.    E-Cigarette/Vaping    E-Cigarette Use Never User      E-Cigarette/Vaping Substances    Nicotine No     THC No     CBD No     Flavoring No     Other No     Unknown No      Social History     Tobacco Use    Smoking status: Former     Current packs/day: 0.00     Average packs/day: 1 pack/day for 50.0 years (50.0 ttl pk-yrs)     Types: Cigarettes     Start date:      Quit date:      Years since quittin.3    Smokeless tobacco: Never    Tobacco comments:     Quit in    Vaping Use    Vaping status: Never Used   Substance Use Topics    Alcohol use: No    Drug use: No        Review of Systems   Constitutional:  Positive for fatigue. Negative for chills and fever.   HENT:  Negative for ear pain and sore throat.    Eyes:  Negative for pain and visual disturbance.   Respiratory:  Negative for cough and shortness of breath.    Cardiovascular:  Negative for chest pain and palpitations.   Gastrointestinal:  Positive for diarrhea. Negative for abdominal pain and vomiting.   Genitourinary:  Negative for dysuria and hematuria.   Musculoskeletal:  Negative for arthralgias and back pain.   Skin:  Negative for color change and rash.   Neurological:  Positive for weakness (generalized). Negative for seizures and syncope.   All other systems reviewed and are negative.      Physical Exam  ED Triage Vitals [24 1759]   Temperature Pulse Respirations Blood Pressure SpO2   97.7 °F (36.5 °C) 83 20 134/63 100 %      Temp Source Heart Rate Source Patient Position - Orthostatic VS BP Location FiO2 (%)   Oral Monitor Lying Right arm --       Pain Score       6             Orthostatic Vital Signs  Vitals:    04/26/24 1759 04/26/24 1930 04/26/24 2145   BP: 134/63 135/68 126/63   Pulse: 83 80 84   Patient Position - Orthostatic VS: Lying Sitting Sitting       Physical Exam  Vitals and nursing note reviewed. Exam conducted with a chaperone present.   Constitutional:       General: She is not in acute distress.     Appearance: Normal appearance. She is well-developed. She is ill-appearing (chronic).   HENT:      Head: Normocephalic and atraumatic.   Eyes:      Conjunctiva/sclera: Conjunctivae normal.   Cardiovascular:      Rate and Rhythm: Normal rate and regular rhythm.      Heart sounds: No murmur heard.  Pulmonary:      Effort: Pulmonary effort is normal. No respiratory distress.      Breath sounds: Normal breath sounds.   Abdominal:      Palpations: Abdomen is soft.      Tenderness: There is no abdominal tenderness. There is no guarding or rebound.   Genitourinary:     Rectum: Guaiac result negative.      Comments: Tech present to chaperone rectal exam  Musculoskeletal:         General: No swelling.      Cervical back: Neck supple.   Skin:     General: Skin is warm and dry.      Capillary Refill: Capillary refill takes less than 2 seconds.   Neurological:      Mental Status: She is alert.         ED Medications  Medications   sodium chloride 0.9 % bolus 1,000 mL (0 mL Intravenous Stopped 4/26/24 2120)   magnesium sulfate 2 g/50 mL IVPB (premix) 2 g (0 g Intravenous Stopped 4/26/24 2120)   iohexol (OMNIPAQUE) 350 MG/ML injection (MULTI-DOSE) 100 mL (100 mL Intravenous Given 4/26/24 1955)   magnesium sulfate 2 g/50 mL IVPB (premix) 2 g (0 g Intravenous Stopped 4/27/24 0020)       Diagnostic Studies  Results Reviewed       Procedure Component Value Units Date/Time    Comprehensive metabolic panel [[id number]]  (Abnormal) Collected: 04/26/24 1832    Lab Status: Final result Specimen: Blood from Central Venous Line Updated: 04/26/24 1858     Sodium 132  mmol/L      Potassium 3.6 mmol/L      Chloride 104 mmol/L      CO2 23 mmol/L      ANION GAP 5 mmol/L      BUN 19 mg/dL      Creatinine 0.96 mg/dL      Glucose 123 mg/dL      Calcium 8.8 mg/dL      Corrected Calcium 9.5 mg/dL      AST 16 U/L      ALT 11 U/L      Alkaline Phosphatase 188 U/L      Total Protein 6.2 g/dL      Albumin 3.1 g/dL      Total Bilirubin 0.35 mg/dL      eGFR 58 ml/min/1.73sq m     Narrative:      National Kid[employer] Foundation guidelines for Chronic Kidney Disease (CKD):     Stage 1 with normal or high GFR (GFR > 90 mL/min/1.73 square meters)    Stage 2 Mild CKD (GFR = 60-89 mL/min/1.73 square meters)    Stage 3A Moderate CKD (GFR = 45-59 mL/min/1.73 square meters)    Stage 3B Moderate CKD (GFR = 30-44 mL/min/1.73 square meters)    Stage 4 Severe CKD (GFR = 15-29 mL/min/1.73 square meters)    Stage 5 End Stage CKD (GFR <15 mL/min/1.73 square meters)  Note: GFR calculation is accurate only with a steady state creatinine    Magnesium [[id number]]  (Abnormal) Collected: 04/26/24 1832    Lab Status: Final result Specimen: Blood from Central Venous Line Updated: 04/26/24 1858     Magnesium 1.2 mg/dL     CBC and differential [[id number]]  (Abnormal) Collected: 04/26/24 1832    Lab Status: Final result Specimen: Blood from Central Venous Line Updated: 04/26/24 1842     WBC 12.13 Thousand/uL      RBC 3.81 Million/uL      Hemoglobin 8.6 g/dL      Hematocrit 29.6 %      MCV 78 fL      MCH 22.6 pg      MCHC 29.1 g/dL      RDW 15.9 %      MPV 10.1 fL      Platelets 373 Thousands/uL      nRBC 0 /100 WBCs      Segmented % 88 %      Immature Grans % 1 %      Lymphocytes % 6 %      Monocytes % 5 %      Eosinophils Relative 0 %      Basophils Relative 0 %      Absolute Neutrophils 10.63 Thousands/µL      Absolute Immature Grans 0.06 Thousand/uL      Absolute Lymphocytes 0.73 Thousands/µL      Absolute Monocytes 0.61 Thousand/µL      Eosinophils Absolute 0.05 Thousand/µL      Basophils Absolute 0.05  Thousands/µL     Clostridium difficile toxin by PCR with EIA [[id number]]     Lab Status: No result Specimen: Stool from Per Rectum                    CT chest abdomen pelvis w contrast   Final Result by Rajesh Ratliff MD (04/26 2218)      4.8 cm low-attenuation focus with rim enhancement at the medial right lower lobe at site of previously treated previously treated lung mass. This finding may reflect recurrence of patient's previously treated lung mass and/or superinfection and    developing abscess. Oncology consult and with possible tissue sampling recommended      Findings of mild enteritis      Decreasing size of pancreatic cystic lesion      The study was marked in EPIC for immediate notification.               Workstation performed: [id number]               Procedures  Procedures      ED Course  ED Course as of 04/27/24 0222 Fri Apr 26, 2024 1955 Hemoglobin(!): 8.6  Hemoccult negative                             SBIRT 22yo+      Flowsheet Row Most Recent Value   Initial Alcohol Screen: US AUDIT-C     1. How often do you have a drink containing alcohol? 0 Filed at: 04/26/2024 1801   3b. FEMALE Any Age, or MALE 65+: How often do you have 4 or more drinks on one occassion? 0 Filed at: 04/26/2024 1801   Audit-C Score 0 Filed at: 04/26/2024 1801   SHAY: How many times in the past year have you...    Used an illegal drug or used a prescription medication for non-medical reasons? Never Filed at: 04/26/2024 1801                  Medical Decision Making  73-year-old female with history of left lung adenocarcinoma with metastasis, on daily oral chemotherapy, presents today for evaluation of diarrhea.  Labs revealed new onset anemia.  Patient was Hemoccult negative.  Labs also reveal hyponatremia and hypomagnesemia.  Patient was scheduled to undergo CT imaging of her chest/abdomen/pelvis in a few days.  Discussed results of CT scan with patient and she does report she has close outpatient follow-up with her  oncologist.  She prefers discharge home at this time.  Stable for discharge, reviewed return precautions.    Amount and/or Complexity of Data Reviewed  Labs: ordered. Decision-making details documented in ED Course.  Radiology: ordered.    Risk  Prescription drug management.          Disposition  Final diagnoses:   Lung cancer (HCC)   Enteritis   Hypomagnesemia   Anemia     Time reflects when diagnosis was documented in both MDM as applicable and the Disposition within this note       Time User Action Codes Description Comment    4/26/2024 11:04 PM Emilee Kirk [C34.90] Lung cancer (HCC)     4/26/2024 11:04 PM Emilee Kirk Add [K52.9] Enteritis     4/26/2024 11:04 PM Emilee Kirk [E83.42] Hypomagnesemia     4/26/2024 11:05 PM Emilee Kirk Add [D64.9] Anemia           ED Disposition       ED Disposition   Discharge    Condition   Stable    Date/Time   Fri Apr 26, 2024 2304    Comment   Melany Griffith discharge to home/self care.                   Follow-up Information       Follow up With Specialties Details Why Contact Info Additional Information    [facility] Emergency Medicine  As needed, If symptoms worsen [address] [place] [id number]  [phone] [facility], [address] [place] [address], [phone]            Discharge Medication List as of 4/26/2024 11:06 PM        CONTINUE these medications which have NOT CHANGED    Details   acetaminophen (TYLENOL) 500 mg tablet Take 1-2 tablets (500-1,000 mg total) by mouth 3 (three) times a day as needed for mild pain, headaches or fever, Starting Mon 8/14/2023, No Print      apixaban (Eliquis) 5 mg TAKE 1 TABLET TWICE A DAY, Normal      aspirin (ECOTRIN LOW STRENGTH) 81 mg EC tablet Take 1 tablet (81 mg total) by mouth daily, Starting Fri 1/21/2022, Normal      atorvastatin (LIPITOR) 40 mg tablet Take 1 tablet (40 mg total) by mouth  daily, Starting Mon 3/11/2024, Normal      busPIRone (BUSPAR) 7.5 mg tablet Take 1 tablet (7.5 mg total) by mouth daily at bedtime, Starting Wed 2/21/2024, Normal      cyanocobalamin (VITAMIN B-12) 100 mcg tablet Take by mouth daily, Historical Med      folic acid (FOLVITE) 1 mg tablet Take 1 tablet (1 mg total) by mouth daily, Starting Mon 7/19/2021, Normal      furosemide (LASIX) 20 mg tablet Take 2 tablets (40 mg total) by mouth daily as needed (for lower leg swelling), Starting Tue 10/10/2023, Normal      gabapentin (Neurontin) 100 mg capsule Take 2 capsules (200 mg total) by mouth 2 (two) times a day, Starting Wed 2/21/2024, Normal      linaGLIPtin (Tradjenta) 5 MG TABS Take 5 mg by mouth daily, Starting Mon 1/22/2024, Normal      loperamide (IMODIUM) 2 mg capsule Take 2 mg by mouth 4 (four) times a day as needed for diarrhea, Historical Med      loratadine (CLARITIN) 10 mg tablet Take 10 mg by mouth as needed, Historical Med      metFORMIN (GLUCOPHAGE) 500 mg tablet Take 1 tablet (500 mg total) by mouth 2 (two) times a day with meals, Starting Mon 3/11/2024, Normal      methocarbamol (ROBAXIN) 500 mg tablet Take 1 tablet (500 mg total) by mouth 3 (three) times a day, Starting Tue 2/27/2024, Normal      metoprolol tartrate (LOPRESSOR) 25 mg tablet Take 1 tablet (25 mg total) by mouth every 12 (twelve) hours, Starting Mon 1/22/2024, Normal      omeprazole (PriLOSEC) 20 mg delayed release capsule Take 1 capsule (20 mg total) by mouth daily, Starting Mon 1/22/2024, Normal      potassium chloride (Klor-Con M10) 10 mEq tablet Take 1 tablet (10 mEq total) by mouth daily, Starting Wed 9/21/2022, Normal      sotalol (BETAPACE) 80 mg tablet Take 1 tablet (80 mg total) by mouth 2 (two) times a day, Starting Mon 1/22/2024, Normal      Sotorasib 120 MG TABS Take 960 mg by mouth daily, Starting Wed 2/14/2024, Normal      valsartan (DIOVAN) 160 mg tablet TAKE 1 TABLET BY MOUTH TWICE A DAY, Normal      venlafaxine (EFFEXOR)  37.5 mg tablet Take 2 tablets (75 mg total) by mouth daily at bedtime, Starting Wed 2/21/2024, Normal           No discharge procedures on file.    PDMP Review         Value Time User    PDMP Reviewed  Yes 2/21/2024 11:36 AM Giles Orr MD             ED Provider  Attending physically available and evaluated Melany Griffith. I managed the patient along with the ED Attending.    Electronically Signed by           Emilee Kirk MD  04/27/24 0226

## 2024-04-27 NOTE — DISCHARGE INSTRUCTIONS
-Please follow-up with your oncologist regarding the results of your CT scan obtained today in the ED   -Follow-up with your primary doctor and have your electrolytes rechecked  -Return to the emergency department for any worsening symptoms or inability to tolerate fluids

## 2024-04-29 ENCOUNTER — APPOINTMENT (EMERGENCY)
Dept: RADIOLOGY | Facility: HOSPITAL | Age: 74
End: 2024-04-29
Payer: MEDICARE

## 2024-04-29 ENCOUNTER — HOSPITAL ENCOUNTER (EMERGENCY)
Facility: HOSPITAL | Age: 74
Discharge: HOME | End: 2024-04-29
Attending: EMERGENCY MEDICINE
Payer: MEDICARE

## 2024-04-29 ENCOUNTER — APPOINTMENT (EMERGENCY)
Dept: CT IMAGING | Facility: HOSPITAL | Age: 74
End: 2024-04-29
Payer: MEDICARE

## 2024-04-29 VITALS
DIASTOLIC BLOOD PRESSURE: 93 MMHG | SYSTOLIC BLOOD PRESSURE: 125 MMHG | OXYGEN SATURATION: 98 % | RESPIRATION RATE: 16 BRPM | HEART RATE: 93 BPM | TEMPERATURE: 98.1 F

## 2024-04-29 DIAGNOSIS — R79.89 ELEVATED D-DIMER: ICD-10-CM

## 2024-04-29 DIAGNOSIS — E87.6 HYPOKALEMIA: ICD-10-CM

## 2024-04-29 DIAGNOSIS — E83.42 HYPOMAGNESEMIA: ICD-10-CM

## 2024-04-29 DIAGNOSIS — R07.9 CHEST PAIN, UNSPECIFIED: Primary | ICD-10-CM

## 2024-04-29 DIAGNOSIS — I48.91 ATRIAL FIBRILLATION WITH RVR (HCC): ICD-10-CM

## 2024-04-29 DIAGNOSIS — C34.91 ADENOCARCINOMA OF RIGHT LUNG (HCC): ICD-10-CM

## 2024-04-29 LAB
2HR DELTA HS TROPONIN: 2 NG/L
ALBUMIN SERPL BCP-MCNC: 3 G/DL (ref 3.5–5)
ALP SERPL-CCNC: 158 U/L (ref 34–104)
ALT SERPL W P-5'-P-CCNC: 13 U/L (ref 7–52)
ANION GAP SERPL CALCULATED.3IONS-SCNC: 9 MMOL/L (ref 4–13)
APTT PPP: 40 SECONDS (ref 23–37)
AST SERPL W P-5'-P-CCNC: 19 U/L (ref 13–39)
BASOPHILS # BLD AUTO: 0.03 THOUSANDS/ÂΜL (ref 0–0.1)
BASOPHILS NFR BLD AUTO: 0 % (ref 0–1)
BILIRUB SERPL-MCNC: 0.35 MG/DL (ref 0.2–1)
BNP SERPL-MCNC: 243 PG/ML (ref 0–100)
BUN SERPL-MCNC: 14 MG/DL (ref 5–25)
CALCIUM ALBUM COR SERPL-MCNC: 9.8 MG/DL (ref 8.3–10.1)
CALCIUM SERPL-MCNC: 9 MG/DL (ref 8.4–10.2)
CARDIAC TROPONIN I PNL SERPL HS: 7 NG/L
CARDIAC TROPONIN I PNL SERPL HS: 9 NG/L
CHLORIDE SERPL-SCNC: 104 MMOL/L (ref 96–108)
CO2 SERPL-SCNC: 23 MMOL/L (ref 21–32)
CREAT SERPL-MCNC: 0.93 MG/DL (ref 0.6–1.3)
D DIMER PPP FEU-MCNC: 1.05 UG/ML FEU
EOSINOPHIL # BLD AUTO: 0.07 THOUSAND/ÂΜL (ref 0–0.61)
EOSINOPHIL NFR BLD AUTO: 1 % (ref 0–6)
ERYTHROCYTE [DISTWIDTH] IN BLOOD BY AUTOMATED COUNT: 16 % (ref 11.6–15.1)
GFR SERPL CREATININE-BSD FRML MDRD: 60 ML/MIN/1.73SQ M
GLUCOSE SERPL-MCNC: 199 MG/DL (ref 65–140)
HCT VFR BLD AUTO: 30.4 % (ref 34.8–46.1)
HGB BLD-MCNC: 8.7 G/DL (ref 11.5–15.4)
IMM GRANULOCYTES # BLD AUTO: 0.09 THOUSAND/UL (ref 0–0.2)
IMM GRANULOCYTES NFR BLD AUTO: 1 % (ref 0–2)
INR PPP: 1.29 (ref 0.84–1.19)
LYMPHOCYTES # BLD AUTO: 0.44 THOUSANDS/ÂΜL (ref 0.6–4.47)
LYMPHOCYTES NFR BLD AUTO: 4 % (ref 14–44)
MAGNESIUM SERPL-MCNC: 1.5 MG/DL (ref 1.9–2.7)
MCH RBC QN AUTO: 22.6 PG (ref 26.8–34.3)
MCHC RBC AUTO-ENTMCNC: 28.6 G/DL (ref 31.4–37.4)
MCV RBC AUTO: 79 FL (ref 82–98)
MONOCYTES # BLD AUTO: 0.59 THOUSAND/ÂΜL (ref 0.17–1.22)
MONOCYTES NFR BLD AUTO: 6 % (ref 4–12)
NEUTROPHILS # BLD AUTO: 9.5 THOUSANDS/ÂΜL (ref 1.85–7.62)
NEUTS SEG NFR BLD AUTO: 88 % (ref 43–75)
NRBC BLD AUTO-RTO: 0 /100 WBCS
PLATELET # BLD AUTO: 346 THOUSANDS/UL (ref 149–390)
PMV BLD AUTO: 10.4 FL (ref 8.9–12.7)
POTASSIUM SERPL-SCNC: 3.4 MMOL/L (ref 3.5–5.3)
PROT SERPL-MCNC: 6.1 G/DL (ref 6.4–8.4)
PROTHROMBIN TIME: 16.8 SECONDS (ref 11.6–14.5)
RBC # BLD AUTO: 3.85 MILLION/UL (ref 3.81–5.12)
SODIUM SERPL-SCNC: 136 MMOL/L (ref 135–147)
WBC # BLD AUTO: 10.72 THOUSAND/UL (ref 4.31–10.16)

## 2024-04-29 PROCEDURE — 93005 ELECTROCARDIOGRAM TRACING: CPT

## 2024-04-29 PROCEDURE — 96366 THER/PROPH/DIAG IV INF ADDON: CPT

## 2024-04-29 PROCEDURE — 71275 CT ANGIOGRAPHY CHEST: CPT

## 2024-04-29 PROCEDURE — 99285 EMERGENCY DEPT VISIT HI MDM: CPT | Performed by: EMERGENCY MEDICINE

## 2024-04-29 PROCEDURE — 83880 ASSAY OF NATRIURETIC PEPTIDE: CPT | Performed by: EMERGENCY MEDICINE

## 2024-04-29 PROCEDURE — 71046 X-RAY EXAM CHEST 2 VIEWS: CPT

## 2024-04-29 PROCEDURE — 85730 THROMBOPLASTIN TIME PARTIAL: CPT | Performed by: EMERGENCY MEDICINE

## 2024-04-29 PROCEDURE — 36415 COLL VENOUS BLD VENIPUNCTURE: CPT | Performed by: EMERGENCY MEDICINE

## 2024-04-29 PROCEDURE — 80053 COMPREHEN METABOLIC PANEL: CPT | Performed by: EMERGENCY MEDICINE

## 2024-04-29 PROCEDURE — 85610 PROTHROMBIN TIME: CPT | Performed by: EMERGENCY MEDICINE

## 2024-04-29 PROCEDURE — 96367 TX/PROPH/DG ADDL SEQ IV INF: CPT

## 2024-04-29 PROCEDURE — 85379 FIBRIN DEGRADATION QUANT: CPT | Performed by: EMERGENCY MEDICINE

## 2024-04-29 PROCEDURE — 96365 THER/PROPH/DIAG IV INF INIT: CPT

## 2024-04-29 PROCEDURE — 99285 EMERGENCY DEPT VISIT HI MDM: CPT

## 2024-04-29 PROCEDURE — 83735 ASSAY OF MAGNESIUM: CPT | Performed by: EMERGENCY MEDICINE

## 2024-04-29 PROCEDURE — 84484 ASSAY OF TROPONIN QUANT: CPT | Performed by: EMERGENCY MEDICINE

## 2024-04-29 PROCEDURE — 85025 COMPLETE CBC W/AUTO DIFF WBC: CPT | Performed by: EMERGENCY MEDICINE

## 2024-04-29 RX ORDER — MAGNESIUM L-LACTATE 84 MG
84 TABLET, EXTENDED RELEASE ORAL 2 TIMES DAILY
Qty: 14 TABLET | Refills: 0 | Status: SHIPPED | OUTPATIENT
Start: 2024-04-29 | End: 2024-05-06

## 2024-04-29 RX ORDER — POTASSIUM CHLORIDE 20 MEQ/1
40 TABLET, EXTENDED RELEASE ORAL ONCE
Status: COMPLETED | OUTPATIENT
Start: 2024-04-29 | End: 2024-04-29

## 2024-04-29 RX ORDER — SOTALOL HYDROCHLORIDE 80 MG/1
80 TABLET ORAL ONCE
Status: COMPLETED | OUTPATIENT
Start: 2024-04-29 | End: 2024-04-29

## 2024-04-29 RX ORDER — MAGNESIUM SULFATE HEPTAHYDRATE 40 MG/ML
4 INJECTION, SOLUTION INTRAVENOUS ONCE
Status: COMPLETED | OUTPATIENT
Start: 2024-04-29 | End: 2024-04-29

## 2024-04-29 RX ORDER — ACETAMINOPHEN 325 MG/1
975 TABLET ORAL ONCE
Status: COMPLETED | OUTPATIENT
Start: 2024-04-29 | End: 2024-04-29

## 2024-04-29 RX ADMIN — IOHEXOL 65 ML: 350 INJECTION, SOLUTION INTRAVENOUS at 10:49

## 2024-04-29 RX ADMIN — METOPROLOL TARTRATE 25 MG: 25 TABLET, FILM COATED ORAL at 12:31

## 2024-04-29 RX ADMIN — APIXABAN 5 MG: 5 TABLET, FILM COATED ORAL at 12:31

## 2024-04-29 RX ADMIN — POTASSIUM CHLORIDE 40 MEQ: 1500 TABLET, EXTENDED RELEASE ORAL at 10:34

## 2024-04-29 RX ADMIN — SODIUM CHLORIDE, SODIUM LACTATE, POTASSIUM CHLORIDE, AND CALCIUM CHLORIDE 1000 ML: .6; .31; .03; .02 INJECTION, SOLUTION INTRAVENOUS at 10:25

## 2024-04-29 RX ADMIN — SOTALOL HYDROCHLORIDE 80 MG: 80 TABLET ORAL at 12:32

## 2024-04-29 RX ADMIN — MAGNESIUM SULFATE HEPTAHYDRATE 4 G: 40 INJECTION, SOLUTION INTRAVENOUS at 11:55

## 2024-04-29 RX ADMIN — ACETAMINOPHEN 975 MG: 325 TABLET, FILM COATED ORAL at 12:30

## 2024-04-29 NOTE — DISCHARGE INSTRUCTIONS
You were seen in the ED for chest pain.  You had bloodwork, EKG, chest xrays, all showing no significant abnormalities. Please follow up with your primary care physician and/or cardiologist within the next 1-2 weeks for continued management of your condition.  Please come back to the ED if your symptoms return or worsen or if you develop uncontrollable pain, shortness of breath.  Thank you very much for utilizing the ED this afternoon.

## 2024-04-29 NOTE — ED PROVIDER NOTES
History  Chief Complaint   Patient presents with    Chest Pain     Midsternal chest pain that radiates to right lateral neck that began this AM when she woke up.  Denies worsened SOB.  Denies n/v.  Currently taking PO chemotherapy at home for lung cancer.     This is a 73-year-old female with a relevant past medical history of adenocarcinoma of the lung, diabetes, hypertension, atrial fibrillation on Eliquis, CKD stage IV, presenting to the ED today for complaint of shortness of breath.  Patient has had shortness of breath for the past 2 weeks, however today she developed chest pain.  Her chest pain is substernal, radiating to the right side of her neck.  It lasted for approximately 1 hour.  It abated spontaneously.  She did not take anything for her pain.  She was sitting on the chair, not exerting herself when the pain started.  She denies any nausea vomiting abdominal pain, fever chills or sweats, recent diarrhea or constipation, focal weakness, numbness or tingling, peripheral edema aside from her baseline, rashes lesions bruises or any other significantly associated symptoms.  Upon arrival to the ED patient is chest pain-free.  Her pain was a pressure in quality, 2-3 out of 10 in intensity, without any other associated symptoms aside from what was mentioned above.        Prior to Admission Medications   Prescriptions Last Dose Informant Patient Reported? Taking?   Sotorasib 120 MG TABS   No No   Sig: Take 960 mg by mouth daily   acetaminophen (TYLENOL) 500 mg tablet  Self No No   Sig: Take 1-2 tablets (500-1,000 mg total) by mouth 3 (three) times a day as needed for mild pain, headaches or fever   apixaban (Eliquis) 5 mg   No No   Sig: TAKE 1 TABLET TWICE A DAY   aspirin (ECOTRIN LOW STRENGTH) 81 mg EC tablet  Self No No   Sig: Take 1 tablet (81 mg total) by mouth daily   atorvastatin (LIPITOR) 40 mg tablet   No No   Sig: Take 1 tablet (40 mg total) by mouth daily   busPIRone (BUSPAR) 7.5 mg tablet   No No    Sig: Take 1 tablet (7.5 mg total) by mouth daily at bedtime   cyanocobalamin (VITAMIN B-12) 100 mcg tablet  Self Yes No   Sig: Take by mouth daily   folic acid (FOLVITE) 1 mg tablet  Self No No   Sig: Take 1 tablet (1 mg total) by mouth daily   furosemide (LASIX) 20 mg tablet  Self No No   Sig: Take 2 tablets (40 mg total) by mouth daily as needed (for lower leg swelling)   gabapentin (Neurontin) 100 mg capsule   No No   Sig: Take 2 capsules (200 mg total) by mouth 2 (two) times a day   linaGLIPtin (Tradjenta) 5 MG TABS   No No   Sig: Take 5 mg by mouth daily   loperamide (IMODIUM) 2 mg capsule  Self Yes No   Sig: Take 2 mg by mouth 4 (four) times a day as needed for diarrhea   loratadine (CLARITIN) 10 mg tablet  Self Yes No   Sig: Take 10 mg by mouth as needed   metFORMIN (GLUCOPHAGE) 500 mg tablet   No No   Sig: Take 1 tablet (500 mg total) by mouth 2 (two) times a day with meals   methocarbamol (ROBAXIN) 500 mg tablet   No No   Sig: Take 1 tablet (500 mg total) by mouth 3 (three) times a day   metoprolol tartrate (LOPRESSOR) 25 mg tablet   No No   Sig: Take 1 tablet (25 mg total) by mouth every 12 (twelve) hours   omeprazole (PriLOSEC) 20 mg delayed release capsule   No No   Sig: Take 1 capsule (20 mg total) by mouth daily   potassium chloride (Klor-Con M10) 10 mEq tablet  Self No No   Sig: Take 1 tablet (10 mEq total) by mouth daily   sotalol (BETAPACE) 80 mg tablet   No No   Sig: Take 1 tablet (80 mg total) by mouth 2 (two) times a day   valsartan (DIOVAN) 160 mg tablet  Self No No   Sig: TAKE 1 TABLET BY MOUTH TWICE A DAY   venlafaxine (EFFEXOR) 37.5 mg tablet   No No   Sig: Take 2 tablets (75 mg total) by mouth daily at bedtime      Facility-Administered Medications: None       Past Medical History:   Diagnosis Date    A-fib (HCC)     Arthritis     Atrial fibrillation (HCC)     Confusion     Diabetes mellitus (HCC)     Diabetes mellitus type 2, uncomplicated (HCC)     Last assessed: 8/17/17     Encephalopathy 1/6/2022    Essential hypertension     Frozen shoulder     L shoulder    GERD (gastroesophageal reflux disease)     Hx of cancer of uterus     Last assessed: 8/21/15    Hyperlipidemia     Hypertension     Hyponatremia 11/16/2016    Lung mass     diagnosed 9/2016    Malignant neoplasm without specification of site (HCC)     Skin cancer, basal cell     right eye area    Stage 4 lung cancer (HCC)     Thrombocytopenia, unspecified (HCC) 1/20/2023       Past Surgical History:   Procedure Laterality Date    APPENDECTOMY      BRONCHOSCOPY N/A 11/17/2016    Procedure: BRONCHOSCOPY FLEXIBLE;  Surgeon: Giles Alonso MD;  Location: [facility];  Service:     CHOLECYSTECTOMY      COLONOSCOPY      COLONOSCOPY      FL GUIDED CENTRAL VENOUS ACCESS DEVICE INSERTION  12/14/2021    GALLBLADDER SURGERY      HYSTERECTOMY  2000    Total abdominal    JOINT REPLACEMENT      LARYNGOSCOPY      Flexible Fiberoptic, (Therapeutic), Resolved: 11/17/16    MOHS SURGERY      Micrographic Surgery Face    [facility]/CELL WASHG SPX N/A 5/24/2017    Procedure: BRONCHOSCOPY FLEXIBLE;  Surgeon: Denzel Manning MD;  Location: [facility];  Service: Pulmonary    MI BRONCHOSCOPY NEEDLE BX TRACHEA MAIN STEM&/BRON N/A 11/17/2016    Procedure: EBUS; FROZEN SECTION ;  Surgeon: Giles Alonso MD;  Location: [facility];  Service: Thoracic    MI INSJ TUNNELED CTR VAD W/SUBQ PORT AGE 5 YR/> N/A 12/14/2021    Procedure: INSERTION VENOUS PORT ( PORT-A-CATH) IR;  Surgeon: Hansel Garduno DO;  [facility];  Service: Interventional Radiology    TONSILECTOMY AND ADNOIDECTOMY      TONSILLECTOMY      TOTAL KNEE ARTHROPLASTY Right 09/23/2014       Family History   Problem Relation Age of Onset    Heart disease Father         cardiac disorder    Hypertension Father     Arthritis Father     Stroke Father         cerebrovascular accident    Skin cancer Father     Diabetes Mother     Heart disease Mother     Dementia Mother      Hypertension Mother     Thyroid disease Mother     Cancer Maternal Grandfather         of unknown origin    Cancer Family     Depression Family     Diabetes Family     Hyperlipidemia Family         essential    Heart disease Family     Hypertension Family     Stroke Family         syndrome    Lung cancer Paternal Uncle     Muscular dystrophy Brother      I have reviewed and agree with the history as documented.    E-Cigarette/Vaping    E-Cigarette Use Never User      E-Cigarette/Vaping Substances    Nicotine No     THC No     CBD No     Flavoring No     Other No     Unknown No      Social History     Tobacco Use    Smoking status: Former     Current packs/day: 0.00     Average packs/day: 1 pack/day for 50.0 years (50.0 ttl pk-yrs)     Types: Cigarettes     Start date:      Quit date:      Years since quittin.3    Smokeless tobacco: Never    Tobacco comments:     Quit in    Vaping Use    Vaping status: Never Used   Substance Use Topics    Alcohol use: No    Drug use: No       Review of Systems   Constitutional:  Negative for activity change, chills and fever.   HENT:  Negative for congestion and rhinorrhea.    Eyes:  Negative for photophobia and visual disturbance.   Respiratory:  Positive for shortness of breath. Negative for cough and chest tightness.    Cardiovascular:  Positive for chest pain. Negative for leg swelling.   Gastrointestinal:  Negative for abdominal distention, nausea and vomiting.   Genitourinary:  Negative for dysuria and frequency.   Musculoskeletal:  Negative for back pain and neck stiffness.   Skin:  Negative for rash and wound.   Neurological:  Negative for dizziness and weakness.   Psychiatric/Behavioral:  Negative for agitation and suicidal ideas.        Physical Exam  Physical Exam  Vitals and nursing note reviewed.   Constitutional:       Appearance: Normal appearance. She is normal weight.   HENT:      Head: Normocephalic and atraumatic.      Right Ear: External ear  normal.      Left Ear: External ear normal.      Nose: Nose normal. No congestion or rhinorrhea.      Mouth/Throat:      Mouth: Mucous membranes are moist.      Pharynx: Oropharynx is clear. No oropharyngeal exudate.   Eyes:      General: No scleral icterus.     Conjunctiva/sclera: Conjunctivae normal.   Cardiovascular:      Rate and Rhythm: Tachycardia present. Rhythm irregular.      Pulses: Normal pulses.      Heart sounds: Normal heart sounds. No murmur heard.     No gallop.   Pulmonary:      Effort: Pulmonary effort is normal. No respiratory distress.      Breath sounds: Normal breath sounds. No stridor. No decreased breath sounds, wheezing, rhonchi or rales.   Abdominal:      General: Abdomen is flat. Bowel sounds are normal. There is no distension.      Palpations: Abdomen is soft. There is no mass.      Tenderness: There is no abdominal tenderness.   Musculoskeletal:         General: No swelling or tenderness. Normal range of motion.      Cervical back: Normal range of motion and neck supple. No tenderness.      Right lower leg: Edema present.      Left lower leg: Edema (2+ nonpitting in the bilateral lower extremities, symmetrical pretibial) present.   Skin:     General: Skin is warm and dry.      Capillary Refill: Capillary refill takes less than 2 seconds.      Coloration: Skin is not jaundiced.      Findings: No bruising.   Neurological:      General: No focal deficit present.      Mental Status: She is alert and oriented to person, place, and time. Mental status is at baseline.      Sensory: No sensory deficit.      Motor: No weakness.   Psychiatric:         Mood and Affect: Mood normal.         Behavior: Behavior normal.         Thought Content: Thought content normal.         Judgment: Judgment normal.         Vital Signs  ED Triage Vitals   Temperature Pulse Respirations Blood Pressure SpO2   04/29/24 0922 04/29/24 0922 04/29/24 0922 04/29/24 0922 04/29/24 0922   98.1 °F (36.7 °C) (!) 123 18 115/67 98  %      Temp Source Heart Rate Source Patient Position - Orthostatic VS BP Location FiO2 (%)   04/29/24 0922 04/29/24 0922 04/29/24 1201 04/29/24 0922 --   Oral Monitor Sitting Left arm       Pain Score       04/29/24 0922       No Pain           Vitals:    04/29/24 1029 04/29/24 1201 04/29/24 1231 04/29/24 1304   BP: 129/65 119/65 125/93    Pulse: 93 (!) 126 (!) 125 93   Patient Position - Orthostatic VS:  Sitting           Visual Acuity      ED Medications  Medications   lactated ringers bolus 1,000 mL (0 mL Intravenous Stopped 4/29/24 1230)   potassium chloride (Klor-Con M20) CR tablet 40 mEq (40 mEq Oral Given 4/29/24 1034)   iohexol (OMNIPAQUE) 350 MG/ML injection (MULTI-DOSE) 65 mL (65 mL Intravenous Given 4/29/24 1049)   magnesium sulfate 4 g/100 mL IVPB (premix) 4 g (0 g Intravenous Stopped 4/29/24 1342)   apixaban (ELIQUIS) tablet 5 mg (5 mg Oral Given 4/29/24 1231)   sotalol (BETAPACE) tablet 80 mg (80 mg Oral Given 4/29/24 1232)   metoprolol tartrate (LOPRESSOR) tablet 25 mg (25 mg Oral Given 4/29/24 1231)   acetaminophen (TYLENOL) tablet 975 mg (975 mg Oral Given 4/29/24 1230)       Diagnostic Studies  Results Reviewed       Procedure Component Value Units Date/Time    HS Troponin I 2hr [[id number]]  (Normal) Collected: 04/29/24 1144    Lab Status: Final result Specimen: Blood from Arm, Left Updated: 04/29/24 1220     hs TnI 2hr 9 ng/L      Delta 2hr hsTnI 2 ng/L     HS Troponin I 4hr [[id number]]     Lab Status: No result Specimen: Blood     Magnesium [[id number]]  (Abnormal) Collected: 04/29/24 1038    Lab Status: Final result Specimen: Blood from Arm, Left Updated: 04/29/24 1117     Magnesium 1.5 mg/dL     HS Troponin 0hr (reflex protocol) [[id number]]  (Normal) Collected: 04/29/24 0935    Lab Status: Final result Specimen: Blood from Arm, Left Updated: 04/29/24 1017     hs TnI 0hr 7 ng/L     B-Type Natriuretic Peptide(BNP) [[id number]]  (Abnormal) Collected: 04/29/24 0935    Lab Status: Final result  Specimen: Blood from Arm, Left Updated: 04/29/24 1015      pg/mL     Comprehensive metabolic panel [[id number]]  (Abnormal) Collected: 04/29/24 0935    Lab Status: Final result Specimen: Blood from Arm, Left Updated: 04/29/24 1011     Sodium 136 mmol/L      Potassium 3.4 mmol/L      Chloride 104 mmol/L      CO2 23 mmol/L      ANION GAP 9 mmol/L      BUN 14 mg/dL      Creatinine 0.93 mg/dL      Glucose 199 mg/dL      Calcium 9.0 mg/dL      Corrected Calcium 9.8 mg/dL      AST 19 U/L      ALT 13 U/L      Alkaline Phosphatase 158 U/L      Total Protein 6.1 g/dL      Albumin 3.0 g/dL      Total Bilirubin 0.35 mg/dL      eGFR 60 ml/min/1.73sq m     Narrative:      [employer] guidelines for Chronic Kidney Disease (CKD):     Stage 1 with normal or high GFR (GFR > 90 mL/min/1.73 square meters)    Stage 2 Mild CKD (GFR = 60-89 mL/min/1.73 square meters)    Stage 3A Moderate CKD (GFR = 45-59 mL/min/1.73 square meters)    Stage 3B Moderate CKD (GFR = 30-44 mL/min/1.73 square meters)    Stage 4 Severe CKD (GFR = 15-29 mL/min/1.73 square meters)    Stage 5 End Stage CKD (GFR <15 mL/min/1.73 square meters)  Note: GFR calculation is accurate only with a steady state creatinine    D-Dimer [[id number]]  (Abnormal) Collected: 04/29/24 0935    Lab Status: Final result Specimen: Blood from Arm, Left Updated: 04/29/24 1008     D-Dimer, Quant 1.05 ug/ml FEU     Narrative:      In the evaluation for possible pulmonary embolism, in the appropriate (Well's Score of 4 or less) patient, the age adjusted d-dimer cutoff for this patient can be calculated as:    Age x 0.01 (in ug/mL) for Age-adjusted D-dimer exclusion threshold for a patient over 50 years.    Protime-INR [[id number]]  (Abnormal) Collected: 04/29/24 0935    Lab Status: Final result Specimen: Blood from Arm, Left Updated: 04/29/24 1008     Protime 16.8 seconds      INR 1.29    APTT [[id number]]  (Abnormal) Collected: 04/29/24 0935    Lab Status: Final  result Specimen: Blood from Arm, Left Updated: 04/29/24 1008     PTT 40 seconds     CBC and differential [[id number]]  (Abnormal) Collected: 04/29/24 0935    Lab Status: Final result Specimen: Blood from Arm, Left Updated: 04/29/24 1000     WBC 10.72 Thousand/uL      RBC 3.85 Million/uL      Hemoglobin 8.7 g/dL      Hematocrit 30.4 %      MCV 79 fL      MCH 22.6 pg      MCHC 28.6 g/dL      RDW 16.0 %      MPV 10.4 fL      Platelets 346 Thousands/uL      nRBC 0 /100 WBCs      Segmented % 88 %      Immature Grans % 1 %      Lymphocytes % 4 %      Monocytes % 6 %      Eosinophils Relative 1 %      Basophils Relative 0 %      Absolute Neutrophils 9.50 Thousands/µL      Absolute Immature Grans 0.09 Thousand/uL      Absolute Lymphocytes 0.44 Thousands/µL      Absolute Monocytes 0.59 Thousand/µL      Eosinophils Absolute 0.07 Thousand/µL      Basophils Absolute 0.03 Thousands/µL                    CTA ED chest PE study   Final Result by Chrissy Estrada MD (04/29 1140)      No pulmonary embolus.      Redemonstration of 7.4 cm necrotic mass in the inferior right mediastinum at the site of treated tumor. In the absence of signs and symptoms of infection/abscess, this likely represents recurrent tumor.            Workstation performed: [id number]         XR chest 2 views   Final Result by Chrissy Estrada MD (04/29 1329)      No acute cardiopulmonary disease.            Workstation performed: [id number]                    Procedures  Procedures         ED Course  ED Course as of 04/29/24 1513   Mon Apr 29, 2024   1009 D-Dimer, Quant(!): 1.05   1157 CTA showing no evidence of VTE, infection.  She has worsening cancer.  With necrotic mass.  Patient notified, with daughter at bedside.   1158 Patient with A-fib with RVR, ordered her home medications.  If unable to control with p.o. meds, will start IV.             HEART Risk Score      Flowsheet Row Most Recent Value   Heart Score Risk Calculator    History 1 Filed at:  04/29/2024 1303   ECG 1 Filed at: 04/29/2024 1303   Age 2 Filed at: 04/29/2024 1303   Risk Factors 2 Filed at: 04/29/2024 1303   Troponin 0 Filed at: 04/29/2024 1303   HEART Score 6 Filed at: 04/29/2024 1303                          SBIRT 22yo+      Flowsheet Row Most Recent Value   Initial Alcohol Screen: US AUDIT-C     1. How often do you have a drink containing alcohol? 0 Filed at: 04/29/2024 0934   2. How many drinks containing alcohol do you have on a typical day you are drinking?  0 Filed at: 04/29/2024 0934   3a. Male UNDER 65: How often do you have five or more drinks on one occasion? 0 Filed at: 04/29/2024 0934   3b. FEMALE Any Age, or MALE 65+: How often do you have 4 or more drinks on one occassion? 0 Filed at: 04/29/2024 0934   Audit-C Score 0 Filed at: 04/29/2024 0934   SHAY: How many times in the past year have you...    Used an illegal drug or used a prescription medication for non-medical reasons? Never Filed at: 04/29/2024 0934                      Medical Decision Making  This is a 73-year-old female with multiple comorbidities, most relevant of which is adenocarcinoma of the lung, on daily oral chemotherapy, atrial fibrillation on Eliquis, presenting to the ED today for complaint of chest pain.  Her chest pain is substernal radiated to the right side of her neck, with some associated shortness of breath.  She denies any infectious symptoms.  She denies any other significantly associated symptoms.  On exam she is profoundly tachycardic, however she does have a history of atrial fibrillation.  She has been taking all of her medications, has not missed any doses, and thus this makes me suspicious of a physiologic cause of her atrial fibrillation with RVR.  Patient is normotensive, afebrile, and otherwise does not have any significant abnormalities on her physical exam.  Her differential diagnosis includes: VTE versus atypical ACS versus pneumonia versus other.  I have lower suspicion for pneumonia,  considering the fact that she has not had any infectious symptoms recently.  Her pain is resolved here in the ED, without therapy for me, which makes there a potential for atypical ACS.  This potentially also could be VTE, however usually her symptoms should be persistent and not resolved spontaneously.CBC shows a hemoglobin of 8.7, similar to what it was 3 days ago.  Her metabolic panel shows a slight hypokalemia, but otherwise is unremarkable.  Her troponin is 7, D-dimer is elevated at 1.05, and for that reason she underwent a CTA of the chest to look for evidence of VTE.  She did not have any evidence of VTE, however she did have worsening cancer/returning cancer.  I did discuss this with the patient, she will discuss this with her oncologist at her next appointment.  Encouraged her to move up her appointment with her oncologist.  Patient did have hypomagnesemia, which was replaced here in the ED, and she was discharged home with a prescription for magnesium replacement as an outpatient.  Patient did have A-fib with RVR while here in the ED, and after IV fluids did not improve her symptoms, I did provide her with her home medications which abated her A-fib with RVR.  The management plan was discussed in detail with the patient at bedside and all questions were answered. Strict ED return instructions were discussed at bedside. Prior to discharge, both verbal and written instructions were provided. We discussed the signs and symptoms that should prompt the patient to return to the ED. All questions were answered and the patient was comfortable with the plan of care and discharged home. The patient agrees to return to the Emergency Department for concerns and/or progression of illness.    Amount and/or Complexity of Data Reviewed  External Data Reviewed: labs, radiology, ECG and notes.  Labs: ordered. Decision-making details documented in ED Course.  Radiology: ordered and independent interpretation  performed.  ECG/medicine tests: independent interpretation performed.    Risk  OTC drugs.  Prescription drug management.             Disposition  Final diagnoses:   Chest pain, unspecified   Elevated d-dimer   Hypokalemia   Hypomagnesemia   Adenocarcinoma of right lung (HCC)   Atrial fibrillation with RVR (HCC)     Time reflects when diagnosis was documented in both MDM as applicable and the Disposition within this note       Time User Action Codes Description Comment    4/29/2024 10:30 AM Alejandra, Amr Add [R07.9] Chest pain, unspecified     4/29/2024 10:30 AM Alejandra, Amr Add [R79.89] Elevated d-dimer     4/29/2024 10:31 AM Alejandra, Amr Add [E87.6] Hypokalemia     4/29/2024  1:02 PM Alejandra, Amr Add [E83.42] Hypomagnesemia     4/29/2024  1:02 PM Alejandra, Amr Add [C34.91] Adenocarcinoma of right lung (HCC)     4/29/2024  1:03 PM Alejandra, Amr Add [I48.91] Atrial fibrillation with RVR (HCC)           ED Disposition       ED Disposition   Discharge    Condition   Stable    Date/Time   Mon Apr 29, 2024 1301    Comment   Melany Griffith discharge to home/self care.                   Follow-up Information       Follow up With Specialties Details Why Contact Info    Salina Dwyer DO Family Medicine Call in 1 day  [address] [place], [place] [id number] [phone]              Discharge Medication List as of 4/29/2024  1:04 PM        START taking these medications    Details   magnesium (MAGTAB) 84 MG (7MEQ) TBCR Take 1 tablet (84 mg total) by mouth 2 (two) times a day for 7 days, Starting Mon 4/29/2024, Until Mon 5/6/2024, Normal           CONTINUE these medications which have NOT CHANGED    Details   acetaminophen (TYLENOL) 500 mg tablet Take 1-2 tablets (500-1,000 mg total) by mouth 3 (three) times a day as needed for mild pain, headaches or fever, Starting Mon 8/14/2023, No Print      apixaban (Eliquis) 5 mg TAKE 1 TABLET TWICE A DAY, Normal      aspirin (ECOTRIN LOW STRENGTH) 81 mg EC tablet Take 1 tablet (81  mg total) by mouth daily, Starting Fri 1/21/2022, Normal      atorvastatin (LIPITOR) 40 mg tablet Take 1 tablet (40 mg total) by mouth daily, Starting Mon 3/11/2024, Normal      busPIRone (BUSPAR) 7.5 mg tablet Take 1 tablet (7.5 mg total) by mouth daily at bedtime, Starting Wed 2/21/2024, Normal      cyanocobalamin (VITAMIN B-12) 100 mcg tablet Take by mouth daily, Historical Med      folic acid (FOLVITE) 1 mg tablet Take 1 tablet (1 mg total) by mouth daily, Starting Mon 7/19/2021, Normal      furosemide (LASIX) 20 mg tablet Take 2 tablets (40 mg total) by mouth daily as needed (for lower leg swelling), Starting Tue 10/10/2023, Normal      gabapentin (Neurontin) 100 mg capsule Take 2 capsules (200 mg total) by mouth 2 (two) times a day, Starting Wed 2/21/2024, Normal      linaGLIPtin (Tradjenta) 5 MG TABS Take 5 mg by mouth daily, Starting Mon 1/22/2024, Normal      loperamide (IMODIUM) 2 mg capsule Take 2 mg by mouth 4 (four) times a day as needed for diarrhea, Historical Med      loratadine (CLARITIN) 10 mg tablet Take 10 mg by mouth as needed, Historical Med      metFORMIN (GLUCOPHAGE) 500 mg tablet Take 1 tablet (500 mg total) by mouth 2 (two) times a day with meals, Starting Mon 3/11/2024, Normal      methocarbamol (ROBAXIN) 500 mg tablet Take 1 tablet (500 mg total) by mouth 3 (three) times a day, Starting Tue 2/27/2024, Normal      metoprolol tartrate (LOPRESSOR) 25 mg tablet Take 1 tablet (25 mg total) by mouth every 12 (twelve) hours, Starting Mon 1/22/2024, Normal      omeprazole (PriLOSEC) 20 mg delayed release capsule Take 1 capsule (20 mg total) by mouth daily, Starting Mon 1/22/2024, Normal      potassium chloride (Klor-Con M10) 10 mEq tablet Take 1 tablet (10 mEq total) by mouth daily, Starting Wed 9/21/2022, Normal      sotalol (BETAPACE) 80 mg tablet Take 1 tablet (80 mg total) by mouth 2 (two) times a day, Starting Mon 1/22/2024, Normal      Sotorasib 120 MG TABS Take 960 mg by mouth daily,  Starting Wed 2/14/2024, Normal      valsartan (DIOVAN) 160 mg tablet TAKE 1 TABLET BY MOUTH TWICE A DAY, Normal      venlafaxine (EFFEXOR) 37.5 mg tablet Take 2 tablets (75 mg total) by mouth daily at bedtime, Starting Wed 2/21/2024, Normal                 PDMP Review         Value Time User    PDMP Reviewed  Yes 2/21/2024 11:36 AM Giles Orr MD            ED Provider  Electronically Signed by             Redd Roberts MD  04/29/24 [id number]

## 2024-04-30 ENCOUNTER — VBI (OUTPATIENT)
Dept: FAMILY MEDICINE CLINIC | Facility: CLINIC | Age: 74
End: 2024-04-30

## 2024-04-30 NOTE — TELEPHONE ENCOUNTER
04/30/24 1:59 PM    Patient contacted post ED visit, [facility] department spoke with patient/caregiver and outreach was successful.    Thank you.  Kevon Dsouza MA  [facility]

## 2024-05-05 LAB
ATRIAL RATE: 101 BPM
P AXIS: 84 DEGREES
PR INTERVAL: 160 MS
QRS AXIS: -30 DEGREES
QRSD INTERVAL: 84 MS
QT INTERVAL: 368 MS
QTC INTERVAL: 477 MS
T WAVE AXIS: 80 DEGREES
VENTRICULAR RATE: 101 BPM

## 2024-05-05 PROCEDURE — 93010 ELECTROCARDIOGRAM REPORT: CPT | Performed by: INTERNAL MEDICINE

## 2024-05-08 ENCOUNTER — TELEPHONE (OUTPATIENT)
Dept: HEMATOLOGY ONCOLOGY | Facility: CLINIC | Age: 74
End: 2024-05-08

## 2024-05-08 ENCOUNTER — APPOINTMENT (OUTPATIENT)
Dept: LAB | Facility: AMBULARY SURGERY CENTER | Age: 74
End: 2024-05-08
Payer: MEDICARE

## 2024-05-08 DIAGNOSIS — N18.30 TYPE 2 DIABETES MELLITUS WITH STAGE 3 CHRONIC KIDNEY DISEASE, WITHOUT LONG-TERM CURRENT USE OF INSULIN, UNSPECIFIED WHETHER STAGE 3A OR 3B CKD (HCC): ICD-10-CM

## 2024-05-08 DIAGNOSIS — C34.12 MALIGNANT NEOPLASM OF UPPER LOBE OF LEFT LUNG (HCC): ICD-10-CM

## 2024-05-08 DIAGNOSIS — E78.2 MIXED HYPERLIPIDEMIA: ICD-10-CM

## 2024-05-08 DIAGNOSIS — E11.22 TYPE 2 DIABETES MELLITUS WITH STAGE 3 CHRONIC KIDNEY DISEASE, WITHOUT LONG-TERM CURRENT USE OF INSULIN, UNSPECIFIED WHETHER STAGE 3A OR 3B CKD (HCC): ICD-10-CM

## 2024-05-08 LAB
ALBUMIN SERPL BCP-MCNC: 3.5 G/DL (ref 3.5–5)
ALP SERPL-CCNC: 117 U/L (ref 34–104)
ALT SERPL W P-5'-P-CCNC: 16 U/L (ref 7–52)
ANION GAP SERPL CALCULATED.3IONS-SCNC: 11 MMOL/L (ref 4–13)
AST SERPL W P-5'-P-CCNC: 20 U/L (ref 13–39)
BASOPHILS # BLD AUTO: 0.03 THOUSANDS/ÂΜL (ref 0–0.1)
BASOPHILS NFR BLD AUTO: 0 % (ref 0–1)
BILIRUB SERPL-MCNC: 0.39 MG/DL (ref 0.2–1)
BUN SERPL-MCNC: 17 MG/DL (ref 5–25)
CALCIUM SERPL-MCNC: 9.5 MG/DL (ref 8.4–10.2)
CHLORIDE SERPL-SCNC: 95 MMOL/L (ref 96–108)
CO2 SERPL-SCNC: 27 MMOL/L (ref 21–32)
CREAT SERPL-MCNC: 1.15 MG/DL (ref 0.6–1.3)
EOSINOPHIL # BLD AUTO: 0.02 THOUSAND/ÂΜL (ref 0–0.61)
EOSINOPHIL NFR BLD AUTO: 0 % (ref 0–6)
ERYTHROCYTE [DISTWIDTH] IN BLOOD BY AUTOMATED COUNT: 16.4 % (ref 11.6–15.1)
EST. AVERAGE GLUCOSE BLD GHB EST-MCNC: 160 MG/DL
GFR SERPL CREATININE-BSD FRML MDRD: 46 ML/MIN/1.73SQ M
GIANT PLATELETS BLD QL SMEAR: PRESENT
GLUCOSE SERPL-MCNC: 170 MG/DL (ref 65–140)
HBA1C MFR BLD: 7.2 %
HCT VFR BLD AUTO: 31.8 % (ref 34.8–46.1)
HGB BLD-MCNC: 9 G/DL (ref 11.5–15.4)
IMM GRANULOCYTES # BLD AUTO: 0.08 THOUSAND/UL (ref 0–0.2)
IMM GRANULOCYTES NFR BLD AUTO: 1 % (ref 0–2)
LYMPHOCYTES # BLD AUTO: 0.62 THOUSANDS/ÂΜL (ref 0.6–4.47)
LYMPHOCYTES NFR BLD AUTO: 4 % (ref 14–44)
MCH RBC QN AUTO: 22.2 PG (ref 26.8–34.3)
MCHC RBC AUTO-ENTMCNC: 28.3 G/DL (ref 31.4–37.4)
MCV RBC AUTO: 79 FL (ref 82–98)
MONOCYTES # BLD AUTO: 0.58 THOUSAND/ÂΜL (ref 0.17–1.22)
MONOCYTES NFR BLD AUTO: 4 % (ref 4–12)
NEUTROPHILS # BLD AUTO: 12.83 THOUSANDS/ÂΜL (ref 1.85–7.62)
NEUTS SEG NFR BLD AUTO: 91 % (ref 43–75)
NRBC BLD AUTO-RTO: 0 /100 WBCS
PLATELET # BLD AUTO: 476 THOUSANDS/UL (ref 149–390)
PLATELET BLD QL SMEAR: ABNORMAL
PMV BLD AUTO: 10.2 FL (ref 8.9–12.7)
POTASSIUM SERPL-SCNC: 4.9 MMOL/L (ref 3.5–5.3)
PROT SERPL-MCNC: 7.2 G/DL (ref 6.4–8.4)
RBC # BLD AUTO: 4.05 MILLION/UL (ref 3.81–5.12)
RBC MORPH BLD: NORMAL
SODIUM SERPL-SCNC: 133 MMOL/L (ref 135–147)
TSH SERPL DL<=0.05 MIU/L-ACNC: 2.86 UIU/ML (ref 0.45–4.5)
WBC # BLD AUTO: 14.16 THOUSAND/UL (ref 4.31–10.16)

## 2024-05-08 PROCEDURE — 80053 COMPREHEN METABOLIC PANEL: CPT

## 2024-05-08 PROCEDURE — 36415 COLL VENOUS BLD VENIPUNCTURE: CPT

## 2024-05-08 PROCEDURE — 85025 COMPLETE CBC W/AUTO DIFF WBC: CPT

## 2024-05-08 PROCEDURE — 84443 ASSAY THYROID STIM HORMONE: CPT

## 2024-05-08 PROCEDURE — 83036 HEMOGLOBIN GLYCOSYLATED A1C: CPT

## 2024-05-08 NOTE — TELEPHONE ENCOUNTER
Appointment Confirmation   Who are you speaking with? Patient   If it is not the patient, are they listed on an active communication consent form? N/A   Which provider is the appointment scheduled with?  Annelise Darling PA-C   When is the appointment scheduled?  Please list date and time 5/10/24 1030   At which location is the appointment scheduled to take place? [facility]   Did caller verbalize understanding of appointment details? Yes

## 2024-05-10 ENCOUNTER — TELEPHONE (OUTPATIENT)
Dept: HEMATOLOGY ONCOLOGY | Facility: CLINIC | Age: 74
End: 2024-05-10

## 2024-05-10 ENCOUNTER — OFFICE VISIT (OUTPATIENT)
Dept: HEMATOLOGY ONCOLOGY | Facility: CLINIC | Age: 74
End: 2024-05-10
Payer: MEDICARE

## 2024-05-10 VITALS
TEMPERATURE: 97.3 F | HEART RATE: 85 BPM | SYSTOLIC BLOOD PRESSURE: 116 MMHG | DIASTOLIC BLOOD PRESSURE: 64 MMHG | OXYGEN SATURATION: 96 % | WEIGHT: 158 LBS | BODY MASS INDEX: 29.08 KG/M2 | HEIGHT: 62 IN | RESPIRATION RATE: 16 BRPM

## 2024-05-10 DIAGNOSIS — C34.92 ADENOCARCINOMA OF LEFT LUNG, STAGE 4 (HCC): ICD-10-CM

## 2024-05-10 DIAGNOSIS — D50.9 MICROCYTIC ANEMIA: ICD-10-CM

## 2024-05-10 DIAGNOSIS — T45.1X5A ANEMIA DUE TO ANTINEOPLASTIC CHEMOTHERAPY: ICD-10-CM

## 2024-05-10 DIAGNOSIS — M25.511 ACUTE PAIN OF RIGHT SHOULDER: ICD-10-CM

## 2024-05-10 DIAGNOSIS — E83.42 HYPOMAGNESEMIA: ICD-10-CM

## 2024-05-10 DIAGNOSIS — D64.81 ANEMIA DUE TO ANTINEOPLASTIC CHEMOTHERAPY: ICD-10-CM

## 2024-05-10 DIAGNOSIS — C34.12 MALIGNANT NEOPLASM OF UPPER LOBE OF LEFT LUNG (HCC): Primary | ICD-10-CM

## 2024-05-10 DIAGNOSIS — R19.7 DIARRHEA, UNSPECIFIED TYPE: ICD-10-CM

## 2024-05-10 PROBLEM — M25.519 SHOULDER PAIN: Status: ACTIVE | Noted: 2024-05-10

## 2024-05-10 PROCEDURE — 99215 OFFICE O/P EST HI 40 MIN: CPT | Performed by: PHYSICIAN ASSISTANT

## 2024-05-10 PROCEDURE — G2211 COMPLEX E/M VISIT ADD ON: HCPCS | Performed by: PHYSICIAN ASSISTANT

## 2024-05-10 NOTE — PROGRESS NOTES
Hematology/Oncology Outpatient Follow- up Note  Melany Griffith 73 y.o. female MRN: @ Encounter: [id number]        Date:  5/10/2024      Assessment / Plan:    Stage 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 of the lung with left scapula involvement diagnosed on 08/2016. PDL expression more than 50%, negative for EGFR mutation, ALK  rearrangement, Ros1 mutation     Treated initially with Pembrolizumab complicated with pneumonitis and later on nivolumab with prednisone 10 mg p.o. Daily with excellent response.     2.  Disease progression in August 2018 in the left scapula with pain no new lesions by PET scan.  Status post radiation therapy to the left scapula and treated with Alimta 500 milligram/meter squared, carboplatin AUC 5.    After 3 cycles,  CT scan in January 2019 showed stable disease, and she was placed on maintenance Alimta 500 milligram/meter squared every 3 weeks.        Alimta dose was reduced to 400 milligram/meter IV due to elevated Creatinine and  then Pemetrexed dose was reduced to 300 milligram/meter squared every 4 weeks.   Cycle #38  Alimta was 6/22/21.         3.  Progression of disease 7/2021.        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  (MAP2K1 mutations are mutually exclusive with BRAF mutations)  There are FDA approved therapies indicated for other disease states such as binimetinib, cobimetinib, selumetinib, trametinib.  Given the very small (0.1%) DNA amplification, use of these medications off label are likely to offer minimal benefit.       Nivolumab 240 mg flat dose every 3 weeks and  carboplatin AUC 5 added to  pemetrexed which was dose reduced to 250 milligram/meter squared, initiated 8/2/2021     4.  Progression of disease.  Treatment changed to Taxotere 75mg/m2 10/2021.  Cyramza added with cycle 2 11/2021.     5.  Right basal ganglia and right ischemic CVA on 01/2022 with hospitalization and rehab which may have been secondary to Cyramza      6.  Therapy changed to Navelbine 30 mg per m2 every other week since 02/02/2022.     7. Progression of disease on CAT scan 1/8/2022, initiated on palliative sotorasib 960 mg p.o. daily     Status post radiation therapy to the right thoracic spine     12/2023 PET scan showed decreased activity of the right middle lobe with viable tumor       8.  Worsening diarrhea, decreased appetite, right shoulder pain, weight loss.  Progressive microcytic anemia.  Hypomagnesemia.  4.0 x 4.1 x 4.8 cm low-attenuation focus with rim enhancement at the medial right lower lobe abutting the IVC and right margin of the distal esophagus.  3 x 2.5 x 3.7 cm on pet/ct 12/2023.  Unclear if this is progression of disease.    Hold sotorasib x 1-2 weeks.  Pet/ct requested.         HPI:    Melany Griffith was admitted to the hospital with arrhythmia and was found to have right lower lobe infiltrate in August 2016.  She was treated with antibiotics however repeat chest x-ray showed persistent right lower lobe infiltrate. Subsequently the patient had a CT scan of the chest which showed a right perihilar mass, subcarinal lymphadenopathy, lytic lesion of the right costovertebral junction at T10 level.  PET scan October 2016 showed a right perihilar mass measuring 3.5 cm with SUV of 8.9, subcarinal lymph nodes measuring 3.4 x 2.2 cm with SUV of 9.2. Nodule in the left upper lobe lung measured 2 x 1.1 cm.  There was a lytic lesion involving the right 10th costovertebral junction with SUV of 14.4.     Biopsy showed non-small cell carcinoma with features suggesting of adenocarcinoma positive for CK 7, CK 19, CA-19-9, ANEUDY-3, partially positive for P40, p63, negative for TTF-1.  She had a history of uterine cancer in 2000 status post hysterectomy and did not require radiation or chemotherapy.  She is status post bilateral oophorectomy, right knee replacement,   tonsillectomy.      She used to smoke for 35 years 1 pack per day however quit smoking 21 years  ago.  She used hormonal replacement therapy for 3 years.  She has a family history significant for skin cancer in her father and coronary artery disease in mother.  She has 2 healthy children.     Treated initially with Pembrolizumab December 2016, Finished in May 2017 secondary to grade 3 pneumonitis. Initiated on prednisone.    Progression: Nivolumab 240 mg flat dose every 2 weeks along with prednisone 10 mg p.o. Daily initiated April 2018- October 2018 with excellent response.     Liquid biopsy showed K-MADHURI mutation G12C, no evidence of MSI high        Disease progression in August 2018 in the left scapula with pain no new lesions by PET scan.  Status post radiation therapy to the left scapula and treated with Alimta 500 milligram/meter squared, carboplatin AUC 5.  After 3 cycles,  CT scan in January 2019 showed stable disease. Carbo discontinued 3/2019.   Maintenance Alimta 500 milligram/meter squared every 3 weeks initiated 3/2019.       Cr 2/20 was 1.5.  Plan was to dose reduce Alimta to 400mg/m2; however cr 2.16 2/24/20 and Alimta was held.      3/4/20:  renal u/s  Minimal fullness of the left renal collecting system without matthieu hydronephrosis.   Chronic right kidney lower pole cortical scar, with adjacent parenchymal calcification measuring 5 mm      She was on prednisone 5 mg p.o. daily because of previous history of pneumonitis. CT scan chest 1/3/2020 showed no evidence of infiltration in the lung parenchyma, prednisone was reduced every other day for 1 month and then discontinued.       Progression of disease 7/2021.       Nivolumab 240 mg flat dose every 3 weeks and carboplatin AUC 5 added to pemetrexed which was dose reduced to 250 milligram/meter squared, initiated 8/2/2021     Progression of disease.  Treatment changed to Taxotere 75mg/m2 10/2021.  Cyramza added with cycle 2 11/2021.     Right basal ganglia and right ischemic CVA on 01/2022 with hospitalization and rehab which may have been  secondary to Cyramza     Therapy changed to Navelbine 30 mg per m2 every other week since 02/02/2022.      8/30/22- CT chest - Enlarged medial right lower lobe spiculated opacity now measuring 3.8 x 1.9 cm previously measured 2.1 x 1.2 suspicious for malignancy.       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      9/2022 sotorasib 960 mg in the morning without food initiated with good tolerance     PET scan on 12/2023 showed decrease in the size and activity of the right lower medial lung mass with residual viable tumor remains, adjacent posttreatment inflammatory changes, no new metastasis    Interval History:    4/26/24 persistent diarrhea x 2 weeks.  Presented to ED.  Decreased appetite, 10lbs weight loss.   CT C/A/P - 4.8 cm low-attenuation focus with rim enhancement at the medial right lower lobe at site of previously treated previously treated lung mass. This finding may reflect recurrence of patient's previously treated lung mass and/or superinfection and developing abscess.  Findings of mild enteritis     4/29/24 presented to the office due to right lateral neck pain, worsening shortness of breath    4/29/24 CT chest - no P.E.    Still with diarrhea, taking Imodium regularly.  Decreased appetite.  Early satiety.  Since the hospital, she has had bilateral lower extremity edema.  Denies any fevers or chills    She reports 2 weeks of right shoulder pain and right lateral rib pain.  Denies any urinary symptoms        Review of Systems   Constitutional:  Positive for appetite change and fatigue. Negative for chills, diaphoresis, fever and unexpected weight change.   HENT:   Negative for mouth sores, nosebleeds, sore throat, tinnitus and voice change.    Eyes:  Negative for eye problems.   Respiratory:  Negative for chest tightness, cough, shortness of breath and wheezing.    Cardiovascular:  Negative for chest pain, leg swelling and palpitations.   Gastrointestinal:  Positive for diarrhea. Negative for abdominal  distention, abdominal pain, blood in stool, constipation, nausea, rectal pain and vomiting.   Endocrine: Negative for hot flashes.   Genitourinary: Negative.     Musculoskeletal:  Positive for arthralgias. Negative for gait problem and myalgias.   Skin:  Negative for itching and rash.   Neurological:  Negative for dizziness, gait problem, headaches, light-headedness and numbness.   Hematological:  Negative for adenopathy.   Psychiatric/Behavioral:  Negative for confusion and sleep disturbance. The patient is not nervous/anxious.         Test Results:        Labs:   Lab Results   Component Value Date    HGB 9.0 (L) 05/08/2024    HCT 31.8 (L) 05/08/2024    MCV 79 (L) 05/08/2024     (H) 05/08/2024    WBC 14.16 (H) 05/08/2024    NRBC 0 05/08/2024     Lab Results   Component Value Date     11/23/2015    K 4.9 05/08/2024    CL 95 (L) 05/08/2024    CO2 27 05/08/2024    ANIONGAP 9 11/23/2015    BUN 17 05/08/2024    CREATININE 1.15 05/08/2024    GLUCOSE 98 01/03/2022    GLUF 211 (H) 06/01/2023    CALCIUM 9.5 05/08/2024    CORRECTEDCA 9.8 04/29/2024    AST 20 05/08/2024    ALT 16 05/08/2024    ALKPHOS 117 (H) 05/08/2024    PROT 6.8 11/23/2015    BILITOT 0.65 11/23/2015    EGFR 46 05/08/2024           Imaging: XR chest 2 views    Result Date: 4/29/2024  Narrative: XR CHEST PA & LATERAL INDICATION: chest pain. COMPARISON: Chest CT 4/26/2024, CXR 1/3/2022. FINDINGS: Tip of right port obscured by leads from a cardiac monitor. No acute disease. Redemonstration of left upper lobe scar at the site of treated tumor. No pneumothorax or pleural effusion. Normal cardiomediastinal silhouette. Bones are unremarkable for age. Upper abdomen normal. Cholecystectomy.     Impression: No acute cardiopulmonary disease. Workstation performed: [id number]     CTA ED chest PE study    Result Date: 4/29/2024  Narrative: CTA - CHEST WITH IV CONTRAST - PULMONARY ANGIOGRAM INDICATION:   CP, SOB, hx cancer. Per my review of the medical  record, midsternal chest pain that radiates to right lateral neck that began when she woke up this morning. On oral at home chemotherapy for stage IV adenocarcinoma of the left upper lobe with metastatic disease to left scapula diagnosed in August 2016. Disease progression multiple times with radiation to left scapula and right thoracic spine. The right paraspinal radiation was completed 9/26/2023. COMPARISON: CXR from today, chest CT 4/26/2024, PET/CT 12/22/2023 TECHNIQUE: CT angiogram timed for optimal opacification of the pulmonary arteries.  Axial, sagittal, and coronal 2D reformats created from source data.  Coronal 3D MIP postprocessing on the acquisition scanner. Radiation dose length product (DLP):  318 mGy-cm .  Radiation dose exposure minimized using iterative reconstruction and automated exposure control. IV Contrast:  65 mL of iohexol (OMNIPAQUE) FINDINGS: PULMONARY ARTERIES:  No pulmonary embolus. LUNGS: No acute disease. Redemonstration of scar in the left upper lobe at the site of treated tumor. Stable nodule in the inferior lingula abutting the left heart border, not FDG avid. AIRWAYS: No significant filling defects. PLEURA: Trace right pleural effusion. HEART/GREAT VESSELS: Normal heart size. Mild coronary artery calcification indicating atherosclerotic heart disease. Right port at cavoatrial junction. MEDIASTINUM AND ANABEL: Redemonstration of 7.4 x 5.0 cm mass in the inferior right mediastinum, abutting the lower esophagus and aorta with central necrosis better demonstrated on the chest CT from 4/26/2024 due to the timing of the contrast bolus. CHEST WALL AND LOWER NECK: Unremarkable. UPPER ABDOMEN: Cholecystectomy. Hepatic cysts. OSSEOUS STRUCTURES: Old left anterior third rib fracture, present since June 2023. Mild degenerative disease in the spine.     Impression: No pulmonary embolus. Redemonstration of 7.4 cm necrotic mass in the inferior right mediastinum at the site of treated tumor. In the  absence of signs and symptoms of infection/abscess, this likely represents recurrent tumor. Workstation performed: [id number]     CT chest abdomen pelvis w contrast    Result Date: 4/26/2024  Narrative: CT CHEST, ABDOMEN AND PELVIS WITH IV CONTRAST INDICATION: diarrhea, metastatic lung cancer. COMPARISON: None. TECHNIQUE: CT examination of the chest, abdomen and pelvis was performed. Multiplanar 2D reformatted images were created from the source data. This examination, like all CT scans performed in the [facility], was performed utilizing techniques to minimize radiation dose exposure, including the use of iterative reconstruction and automated exposure control. Radiation dose length product (DLP) for this visit: 377 mGy-cm IV Contrast: 100 mL of iohexol (OMNIPAQUE) Enteric Contrast: Not administered. FINDINGS: CHEST LUNGS: 4.0 x 4.1 x 4.8 cm low-attenuation focus with rim enhancement at the medial right lower lobe abutting the IVC and right margin of the distal esophagus. Involuting posttreatment changes at the left upper lobe. Chronic atelectasis/scar at the right middle lobe. No tracheal or endobronchial lesion. PLEURA: Unremarkable. HEART/GREAT VESSELS: Heart is unremarkable for patient's age. No thoracic aortic aneurysm. MEDIASTINUM AND ANABEL: Unremarkable. CHEST WALL AND LOWER NECK: Unremarkable. ABDOMEN LIVER/BILIARY TREE: Subcentimeter hypoattenuating lesion(s), too small to characterize but statistically likely benign, which do not require follow-up (ACR White Paper 2017). No suspicious mass. Normal hepatic contours. No biliary dilation. GALLBLADDER: Post cholecystectomy. SPLEEN: Unremarkable. PANCREAS: Decreased size of cystic lesion at the pancreatic body which now measures 1.2 cm (measured 3.2 cm).. ADRENAL GLANDS: Unremarkable. KIDNEYS/URETERS: Stable right renal cortical scarring with a focus of calcification. No hydronephrosis. STOMACH AND BOWEL: Colonic diverticulosis without findings  "of acute diverticulitis. Fluid distention with wall hyperemia involving numerous loops of small bowel APPENDIX: No findings to suggest appendicitis. ABDOMINOPELVIC CAVITY: No ascites. No pneumoperitoneum. No lymphadenopathy. VESSELS: Unremarkable for patient's age. PELVIS REPRODUCTIVE ORGANS: Post hysterectomy. URINARY BLADDER: Unremarkable. ABDOMINAL WALL/INGUINAL REGIONS: Unremarkable. BONES: No acute fracture or suspicious osseous lesion.     Impression: 4.8 cm low-attenuation focus with rim enhancement at the medial right lower lobe at site of previously treated previously treated lung mass. This finding may reflect recurrence of patient's previously treated lung mass and/or superinfection and developing abscess. Oncology consult and with possible tissue sampling recommended Findings of mild enteritis Decreasing size of pancreatic cystic lesion The study was marked in EPIC for immediate notification. Workstation performed: [id number]             Allergies:   Allergies   Allergen Reactions    Amoxicillin Hives    Amoxicillin Rash and Hives    Cardizem [Diltiazem] Rash     Rash      Statins Myalgia     Severe muscle aching  Terrible pains    Zofran [Ondansetron] Palpitations     Current Medications: Reviewed  PMH/FH/SH:  Reviewed      Physical Exam:    1.73 meters squared    Ht Readings from Last 3 Encounters:   05/10/24 5' 2\" (1.575 m)   02/27/24 5' 2\" (1.575 m)   02/01/24 5' 2\" (1.575 m)        Wt Readings from Last 3 Encounters:   05/10/24 71.7 kg (158 lb)   04/26/24 72.3 kg (159 lb 6.3 oz)   02/27/24 76.2 kg (168 lb)        Temp Readings from Last 3 Encounters:   05/10/24 (!) 97.3 °F (36.3 °C) (Temporal)   04/29/24 98.1 °F (36.7 °C) (Oral)   04/26/24 97.7 °F (36.5 °C) (Oral)        BP Readings from Last 3 Encounters:   05/10/24 116/64   04/29/24 125/93   04/26/24 126/63             Physical Exam  Vitals reviewed.   Constitutional:       General: She is not in acute distress.     Appearance: She is " well-developed. She is ill-appearing (chronic). She is not diaphoretic.   HENT:      Head: Normocephalic and atraumatic.   Eyes:      Conjunctiva/sclera: Conjunctivae normal.   Neck:      Trachea: No tracheal deviation.   Cardiovascular:      Rate and Rhythm: Normal rate and regular rhythm.      Heart sounds: No murmur heard.     No friction rub. No gallop.   Pulmonary:      Effort: Pulmonary effort is normal. No respiratory distress.      Breath sounds: Normal breath sounds. No stridor. No wheezing, rhonchi or rales.   Chest:      Chest wall: No tenderness.   Abdominal:      General: There is no distension.      Palpations: Abdomen is soft.      Tenderness: There is no abdominal tenderness.   Musculoskeletal:         General: No tenderness or signs of injury.      Cervical back: Normal range of motion and neck supple.      Comments: No pain to palpation right shoulder or ribs.  Full ROM   Lymphadenopathy:      Cervical: No cervical adenopathy.   Skin:     General: Skin is warm and dry.      Coloration: Skin is not pale.      Findings: No erythema.   Neurological:      Mental Status: She is alert. Mental status is at baseline.   Psychiatric:         Behavior: Behavior normal.         Thought Content: Thought content normal.         ECO      Emergency Contacts:    Extended Emergency Contact Information  Primary Emergency Contact: Patel Reinoso  Address: [facility] [place] [address] [place]  Mobile Phone: [phone]  Relation: Brother  Secondary Emergency Contact: Liz Booth  Address: [address] [place] [address] [facility] [place]  Mobile Phone: [phone]  Relation: Daughter

## 2024-05-17 ENCOUNTER — TELEPHONE (OUTPATIENT)
Dept: HEMATOLOGY ONCOLOGY | Facility: CLINIC | Age: 74
End: 2024-05-17

## 2024-05-17 DIAGNOSIS — M79.89 LEG SWELLING: ICD-10-CM

## 2024-05-17 DIAGNOSIS — C34.12 MALIGNANT NEOPLASM OF UPPER LOBE OF LEFT LUNG (HCC): Primary | ICD-10-CM

## 2024-05-17 DIAGNOSIS — R60.9 FLUID RETENTION: ICD-10-CM

## 2024-05-17 NOTE — TELEPHONE ENCOUNTER
Returned call to patient. She states she has been having leg swelling with weeping for the past 2 days. Denies all other symptoms including shortness of breath.     Discussed with Dr. Sam. Patient should start lasix 20mg daily. Rx sent and patient made aware. She knows to call if symptoms do not improve.

## 2024-05-18 RX ORDER — FUROSEMIDE 20 MG/1
20 TABLET ORAL DAILY
Qty: 30 TABLET | Refills: 0 | Status: SHIPPED | OUTPATIENT
Start: 2024-05-18 | End: 2024-05-21

## 2024-05-20 ENCOUNTER — OFFICE VISIT (OUTPATIENT)
Dept: PALLIATIVE MEDICINE | Facility: CLINIC | Age: 74
End: 2024-05-20

## 2024-05-20 VITALS
TEMPERATURE: 97.3 F | BODY MASS INDEX: 28.98 KG/M2 | DIASTOLIC BLOOD PRESSURE: 80 MMHG | HEART RATE: 82 BPM | HEIGHT: 62 IN | OXYGEN SATURATION: 97 % | SYSTOLIC BLOOD PRESSURE: 98 MMHG | WEIGHT: 157.5 LBS | RESPIRATION RATE: 16 BRPM

## 2024-05-20 DIAGNOSIS — I50.32 CHRONIC DIASTOLIC HEART FAILURE (HCC): ICD-10-CM

## 2024-05-20 DIAGNOSIS — C34.92 ADENOCARCINOMA OF LEFT LUNG, STAGE 4 (HCC): Primary | ICD-10-CM

## 2024-05-20 DIAGNOSIS — Z51.5 PALLIATIVE CARE PATIENT: ICD-10-CM

## 2024-05-20 DIAGNOSIS — M25.50 JOINT PAIN: ICD-10-CM

## 2024-05-20 DIAGNOSIS — F32.9 CURRENT EPISODE OF MAJOR DEPRESSIVE DISORDER WITHOUT PRIOR EPISODE: ICD-10-CM

## 2024-05-20 DIAGNOSIS — R53.0 NEOPLASTIC MALIGNANT RELATED FATIGUE: ICD-10-CM

## 2024-05-20 DIAGNOSIS — F41.9 ANXIETY: ICD-10-CM

## 2024-05-20 DIAGNOSIS — R11.0 NAUSEA: ICD-10-CM

## 2024-05-20 DIAGNOSIS — G89.3 CANCER RELATED PAIN: Chronic | ICD-10-CM

## 2024-05-20 RX ORDER — PROCHLORPERAZINE MALEATE 10 MG
10 TABLET ORAL 3 TIMES DAILY PRN
Qty: 30 TABLET | Refills: 2 | Status: ON HOLD | OUTPATIENT
Start: 2024-05-20

## 2024-05-20 NOTE — ASSESSMENT & PLAN NOTE
Patient reports her chronic pain including polyarthralgia and neuropathy is ongoing, but does respond to medications (including a prior prescription of oxycodone). She is generally been focusing on Tylenol or other non-opioids.  Continue Tylenol PRN. Patient has been taking 1000 mg PRN, usually about once per day. There is no interaction between sotorasib and Tylenol.  Continue gabapentin.  Venlafaxine may help with chronic pain.  Recommend topical OTC products, local application of heat or cold. Do not use heat on top of topical agents. Do not mix topical agents.

## 2024-05-20 NOTE — ASSESSMENT & PLAN NOTE
"Stage IV non-small cell lung cancer (diagnosed 2016) w/ osseous metastasis s/p chemotherapy + immunotherapy + RT. Current plan includes systemic therapy with sotorasib. Patient had a holiday from sotorasib and noted she \"felt a whole lot better\"; with this medication she has significant fatigue, low appetite, episodes of diarrhea.  Recommended patient consider discussing holidays versus alternative therapies with Medical Oncology.  Continue disease-directed cares.  "

## 2024-05-20 NOTE — PROGRESS NOTES
"Follow-up with Palliative and Supportive Care  Melany Griffith 73 y.o. female [id number]    ASSESSMENT & PLAN:    1. Adenocarcinoma of left lung, stage 4 (HCC)  Assessment & Plan:  Stage IV non-small cell lung cancer (diagnosed 2016) w/ osseous metastasis s/p chemotherapy + immunotherapy + RT. Current plan includes systemic therapy with sotorasib. Patient had a holiday from sotorasib and noted she \"felt a whole lot better\"; with this medication she has significant fatigue, low appetite, episodes of diarrhea.  Recommended patient consider discussing holidays versus alternative therapies with Medical Oncology.  Continue disease-directed cares.  2. Chronic diastolic heart failure (HCC)  Assessment & Plan:  Wt Readings from Last 3 Encounters:   05/20/24 71.4 kg (157 lb 8 oz)   05/10/24 71.7 kg (158 lb)   04/26/24 72.3 kg (159 lb 6.3 oz)   Patient has some lower extremity edema; management per Cardiology or Medical Oncology. May be impacting fatigue.  3. Current episode of major depressive disorder without prior episode  Assessment & Plan:  Emotional / psychosocial support provided today.  Continue BH regimen including buspirone, venlafaxine. Gabapentin may also help here.  4. Anxiety  Assessment & Plan:  Continue BH regimen.  5. Nausea  Assessment & Plan:  No recent nausea, but this is a recurrent issue for her. Refilling prochlorperazine, use PRN N/V.  Orders:  -     prochlorperazine (COMPAZINE) 10 mg tablet; Take 1 tablet (10 mg total) by mouth 3 (three) times a day as needed for nausea or vomiting  6. Neoplastic malignant related fatigue  Assessment & Plan:  Progressive, interfering with quality of life. Possibly related to sotorasib as patient felt improved energy during a holiday from sotorasib. Patient declines today's offer of referral to Oncology PT, but will consider.  7. Joint pain  8. Cancer related pain  Assessment & Plan:  Patient reports her chronic pain including polyarthralgia and neuropathy is ongoing, " but does respond to medications (including a prior prescription of oxycodone). She is generally been focusing on Tylenol or other non-opioids.  Continue Tylenol PRN. Patient has been taking 1000 mg PRN, usually about once per day. There is no interaction between sotorasib and Tylenol.  Continue gabapentin.  Venlafaxine may help with chronic pain.  Recommend topical OTC products, local application of heat or cold. Do not use heat on top of topical agents. Do not mix topical agents.  9. Palliative care patient  Assessment & Plan:  ACP: Patient has completed advanced directives (POLST and Power of ). In EMR.  Reviewed notes (Medical Oncology, ED, PCP), labs (5/8/24 Cr 1.15, GFR 46, alb 3.5, Hb 9.0), imaging (4/29/24 CTA ED chest PE study + CXR, 4/26/24 [facility]). See below for more data.  Return in about 4 months (around 9/20/2024).  Medication safety issues addressed - no driving under the influence of narcotics (including opioids), watch for adverse effects including AMS or respiratory depression (slowed breathing), keep medications stored in a safe/locked environment, do not use alcohol while opioids or other narcotics are in one's system, do not travel with more than the minimum number of tablets or capsules required for the trip.  I have personally queried the patient's controlled substance dispensing history in the Prescription Drug Monitoring Program in compliance with regulations before I have prescribed any controlled substances. The prescription history is consistent with prescribed therapy and our practice policies.  Orders:  -     prochlorperazine (COMPAZINE) 10 mg tablet; Take 1 tablet (10 mg total) by mouth 3 (three) times a day as needed for nausea or vomiting        30 minutes were spent in this ambulatory visit with greater than 50% of the time spent face to face with patient in counseling or coordination of care including symptom assessment and management, medication review, medication  "adjustment, psychosocial support, chart review, imaging review, lab review, supportive listening, and anticipatory guidance. All of the patient's questions were answered during this discussion.    SUBJECTIVE:  Chief Complaint   Patient presents with    Follow-up    Cancer    Anxiety    Counseling    Pain        HPI    Melany Griffith is a 73 y.o. female w/ stage IV non-small cell lung cancer (diagnosed 2016) w/ osseous metastasis s/p chemotherapy + immunotherapy + RT, DM2, HFpEF, Afib, CKD, HTN+HLD, h/o CVA. She follows w/ Annelise Darling PA-C + Dr Sam (Medical Oncology), Dr ROSALEE Fuchs (Radiation Oncology), [facility], Dr rAita (Cardiology), Dr Soto (Neurology). Plan includes systemic therapy w/ sotorasib.    Patient reports that she has been feeling \"so tired\". Fatigue has been a progressive issue for her in recent weeks and months. She stated she had day holiday from sotorasib and that she felt \"a whole lot better\" off of that medication. She has recurrent diarrhea with sotorasib and noted there was no diarrhea during that holiday. Thankfully she has not had recent diarrhea. She also has not had recent nausea but this is a recurrent issue for her; her nausea medicines including Compazine provide some relief.    Patient states her appetite has been \"crappy\" and her brother [place] frequently reminds her to eat. She feels her mood is \"all right though she occasionally feels aggravated. She reports edema is a recurrent issue for her; she is prescribed a diuretic by another provider.    The following portions of the medical history were reviewed: past medical history, surgical history, problem list, medication list, family history, and social history.      Current Outpatient Medications:     acetaminophen (TYLENOL) 500 mg tablet, Take 1-2 tablets (500-1,000 mg total) by mouth 3 (three) times a day as needed for mild pain, headaches or fever, Disp: 540 tablet, Rfl: 3    apixaban (Eliquis) 5 mg, TAKE " 1 TABLET TWICE A DAY, Disp: 180 tablet, Rfl: 3    aspirin (ECOTRIN LOW STRENGTH) 81 mg EC tablet, Take 1 tablet (81 mg total) by mouth daily, Disp: 30 tablet, Rfl: 0    atorvastatin (LIPITOR) 40 mg tablet, Take 1 tablet (40 mg total) by mouth daily, Disp: 90 tablet, Rfl: 1    busPIRone (BUSPAR) 7.5 mg tablet, Take 1 tablet (7.5 mg total) by mouth daily at bedtime, Disp: 90 tablet, Rfl: 2    cyanocobalamin (VITAMIN B-12) 100 mcg tablet, Take by mouth daily, Disp: , Rfl:     diphenhydrAMINE HCl (BENADRYL ALLERGY PO), , Disp: , Rfl:     folic acid (FOLVITE) 1 mg tablet, Take 1 tablet (1 mg total) by mouth daily, Disp: 90 tablet, Rfl: 1    furosemide (LASIX) 20 mg tablet, Take 2 tablets (40 mg total) by mouth daily as needed (for lower leg swelling), Disp: 60 tablet, Rfl: 0    gabapentin (Neurontin) 100 mg capsule, Take 2 capsules (200 mg total) by mouth 2 (two) times a day, Disp: 360 capsule, Rfl: 3    linaGLIPtin (Tradjenta) 5 MG TABS, Take 5 mg by mouth daily, Disp: 90 tablet, Rfl: 3    loperamide (IMODIUM) 2 mg capsule, Take 2 mg by mouth 4 (four) times a day as needed for diarrhea, Disp: , Rfl:     loratadine (CLARITIN) 10 mg tablet, Take 10 mg by mouth as needed, Disp: , Rfl:     metFORMIN (GLUCOPHAGE) 500 mg tablet, Take 1 tablet (500 mg total) by mouth 2 (two) times a day with meals, Disp: 180 tablet, Rfl: 1    metoprolol tartrate (LOPRESSOR) 25 mg tablet, Take 1 tablet (25 mg total) by mouth every 12 (twelve) hours, Disp: 180 tablet, Rfl: 3    Multiple Vitamin (MULTIVITAMIN ADULT PO), , Disp: , Rfl:     Omega-3 Fatty Acids (OMEGA-3 FISH OIL PO), , Disp: , Rfl:     omeprazole (PriLOSEC) 20 mg delayed release capsule, Take 1 capsule (20 mg total) by mouth daily, Disp: 90 capsule, Rfl: 1    potassium chloride (Klor-Con M10) 10 mEq tablet, Take 1 tablet (10 mEq total) by mouth daily, Disp: 90 tablet, Rfl: 3    prochlorperazine (COMPAZINE) 10 mg tablet, Take 1 tablet (10 mg total) by mouth 3 (three) times a day as  "needed for nausea or vomiting, Disp: 30 tablet, Rfl: 2    sotalol (BETAPACE) 80 mg tablet, Take 1 tablet (80 mg total) by mouth 2 (two) times a day, Disp: 180 tablet, Rfl: 3    Sotorasib 120 MG TABS, Take 960 mg by mouth daily, Disp: 240 tablet, Rfl: 11    valsartan (DIOVAN) 160 mg tablet, TAKE 1 TABLET BY MOUTH TWICE A DAY, Disp: 180 tablet, Rfl: 3    venlafaxine (EFFEXOR) 37.5 mg tablet, Take 2 tablets (75 mg total) by mouth daily at bedtime, Disp: 180 tablet, Rfl: 3    furosemide (LASIX) 20 mg tablet, Take 1 tablet (20 mg total) by mouth daily (Patient not taking: Reported on 5/20/2024), Disp: 30 tablet, Rfl: 0    magnesium (MAGTAB) 84 MG (7MEQ) TBCR, Take 1 tablet (84 mg total) by mouth 2 (two) times a day for 7 days (Patient taking differently: Take 84 mg by mouth 2 (two) times a day Ran out of medication), Disp: 14 tablet, Rfl: 0    OBJECTIVE:  BP 98/80 (BP Location: Left arm, Patient Position: Sitting, Cuff Size: Standard)   Pulse 82   Temp (!) 97.3 °F (36.3 °C) (Temporal)   Resp 16   Ht 5' 2\" (1.575 m)   Wt 71.4 kg (157 lb 8 oz)   LMP  (LMP Unknown)   SpO2 97%   BMI 28.81 kg/m²   Physical Exam  Vitals reviewed.   Constitutional:       General: She is not in acute distress.     Appearance: She is well-groomed and overweight. She is ill-appearing. She is not toxic-appearing.      Comments: Fatigued. Debilitated.   HENT:      Head: Normocephalic and atraumatic.      Right Ear: External ear normal.      Left Ear: External ear normal.   Eyes:      General: No scleral icterus.        Right eye: No discharge.         Left eye: No discharge.      Extraocular Movements: Extraocular movements intact.      Conjunctiva/sclera: Conjunctivae normal.      Pupils: Pupils are equal, round, and reactive to light.   Cardiovascular:      Rate and Rhythm: Normal rate.   Pulmonary:      Effort: Pulmonary effort is normal. No tachypnea, bradypnea, accessory muscle usage or respiratory distress.      Comments: Able to speak " comfortably in complete sentences on room air at rest.  Abdominal:      General: There is no distension.      Tenderness: There is no guarding.   Musculoskeletal:      Cervical back: Normal range of motion.      Right lower leg: Edema present.      Left lower leg: Edema present.   Skin:     General: Skin is dry.      Coloration: Skin is not pale.   Neurological:      Mental Status: She is alert and oriented to person, place, and time.      Cranial Nerves: No dysarthria or facial asymmetry.      Gait: Gait abnormal (arrived in transport chair).   Psychiatric:         Attention and Perception: Attention normal.         Mood and Affect: Mood and affect normal.         Speech: Speech normal.         Behavior: Behavior normal. Behavior is cooperative.         Thought Content: Thought content normal.         Cognition and Memory: Cognition and memory normal.         Judgment: Judgment normal.          Recent labs:  Lab Results   Component Value Date/Time    SODIUM 133 (L) 05/08/2024 03:10 PM    K 4.9 05/08/2024 03:10 PM    K 4.0 11/23/2015 06:13 AM    BUN 17 05/08/2024 03:10 PM    BUN 23 11/23/2015 06:13 AM    CREATININE 1.15 05/08/2024 03:10 PM    CREATININE 1.14 11/23/2015 06:13 AM    GLUC 170 (H) 05/08/2024 03:10 PM    CALCIUM 9.5 05/08/2024 03:10 PM    CALCIUM 9.2 11/23/2015 06:13 AM    AST 20 05/08/2024 03:10 PM    AST 20 11/23/2015 06:13 AM    ALT 16 05/08/2024 03:10 PM    ALT 32 11/23/2015 06:13 AM    ALB 3.5 05/08/2024 03:10 PM    ALB 3.6 11/23/2015 06:13 AM    TP 7.2 05/08/2024 03:10 PM    EGFR 46 05/08/2024 03:10 PM     Lab Results   Component Value Date/Time    HGB 9.0 (L) 05/08/2024 03:10 PM    HGB 14.2 06/17/2015 06:19 AM    WBC 14.16 (H) 05/08/2024 03:10 PM    WBC 5.67 06/17/2015 06:19 AM     (H) 05/08/2024 03:10 PM     06/17/2015 06:19 AM    INR 1.29 (H) 04/29/2024 09:35 AM    INR 2.64 (H) 09/29/2014 06:12 PM    PTT 40 (H) 04/29/2024 09:35 AM     Lab Results   Component Value Date/Time     UNT6UGVCXLFL 2.858 05/08/2024 03:10 PM    GTW1PKEQENLY 2.211 06/17/2015 06:19 AM       Recent Imaging:  Procedure: XR chest 2 views    Result Date: 4/29/2024  Narrative: XR CHEST PA & LATERAL INDICATION: chest pain. COMPARISON: Chest CT 4/26/2024, CXR 1/3/2022. FINDINGS: Tip of right port obscured by leads from a cardiac monitor. No acute disease. Redemonstration of left upper lobe scar at the site of treated tumor. No pneumothorax or pleural effusion. Normal cardiomediastinal silhouette. Bones are unremarkable for age. Upper abdomen normal. Cholecystectomy.     Impression: No acute cardiopulmonary disease. Workstation performed: [id number]     Procedure: CTA ED chest PE study    Result Date: 4/29/2024  Narrative: CTA - CHEST WITH IV CONTRAST - PULMONARY ANGIOGRAM INDICATION:   CP, SOB, hx cancer. Per my review of the medical record, midsternal chest pain that radiates to right lateral neck that began when she woke up this morning. On oral at home chemotherapy for stage IV adenocarcinoma of the left upper lobe with metastatic disease to left scapula diagnosed in August 2016. Disease progression multiple times with radiation to left scapula and right thoracic spine. The right paraspinal radiation was completed 9/26/2023. COMPARISON: CXR from today, chest CT 4/26/2024, PET/CT 12/22/2023 TECHNIQUE: CT angiogram timed for optimal opacification of the pulmonary arteries.  Axial, sagittal, and coronal 2D reformats created from source data.  Coronal 3D MIP postprocessing on the acquisition scanner. Radiation dose length product (DLP):  318 mGy-cm .  Radiation dose exposure minimized using iterative reconstruction and automated exposure control. IV Contrast:  65 mL of iohexol (OMNIPAQUE) FINDINGS: PULMONARY ARTERIES:  No pulmonary embolus. LUNGS: No acute disease. Redemonstration of scar in the left upper lobe at the site of treated tumor. Stable nodule in the inferior lingula abutting the left heart border, not FDG avid.  AIRWAYS: No significant filling defects. PLEURA: Trace right pleural effusion. HEART/GREAT VESSELS: Normal heart size. Mild coronary artery calcification indicating atherosclerotic heart disease. Right port at cavoatrial junction. MEDIASTINUM AND ANABEL: Redemonstration of 7.4 x 5.0 cm mass in the inferior right mediastinum, abutting the lower esophagus and aorta with central necrosis better demonstrated on the chest CT from 4/26/2024 due to the timing of the contrast bolus. CHEST WALL AND LOWER NECK: Unremarkable. UPPER ABDOMEN: Cholecystectomy. Hepatic cysts. OSSEOUS STRUCTURES: Old left anterior third rib fracture, present since June 2023. Mild degenerative disease in the spine.     Impression: No pulmonary embolus. Redemonstration of 7.4 cm necrotic mass in the inferior right mediastinum at the site of treated tumor. In the absence of signs and symptoms of infection/abscess, this likely represents recurrent tumor. Workstation performed: [id number]     Procedure: CT chest abdomen pelvis w contrast    Result Date: 4/26/2024  Narrative: CT CHEST, ABDOMEN AND PELVIS WITH IV CONTRAST INDICATION: diarrhea, metastatic lung cancer. COMPARISON: None. TECHNIQUE: CT examination of the chest, abdomen and pelvis was performed. Multiplanar 2D reformatted images were created from the source data. This examination, like all CT scans performed in the [facility], was performed utilizing techniques to minimize radiation dose exposure, including the use of iterative reconstruction and automated exposure control. Radiation dose length product (DLP) for this visit: 377 mGy-cm IV Contrast: 100 mL of iohexol (OMNIPAQUE) Enteric Contrast: Not administered. FINDINGS: CHEST LUNGS: 4.0 x 4.1 x 4.8 cm low-attenuation focus with rim enhancement at the medial right lower lobe abutting the IVC and right margin of the distal esophagus. Involuting posttreatment changes at the left upper lobe. Chronic atelectasis/scar at the right  middle lobe. No tracheal or endobronchial lesion. PLEURA: Unremarkable. HEART/GREAT VESSELS: Heart is unremarkable for patient's age. No thoracic aortic aneurysm. MEDIASTINUM AND ANABEL: Unremarkable. CHEST WALL AND LOWER NECK: Unremarkable. ABDOMEN LIVER/BILIARY TREE: Subcentimeter hypoattenuating lesion(s), too small to characterize but statistically likely benign, which do not require follow-up (ACR White Paper 2017). No suspicious mass. Normal hepatic contours. No biliary dilation. GALLBLADDER: Post cholecystectomy. SPLEEN: Unremarkable. PANCREAS: Decreased size of cystic lesion at the pancreatic body which now measures 1.2 cm (measured 3.2 cm).. ADRENAL GLANDS: Unremarkable. KIDNEYS/URETERS: Stable right renal cortical scarring with a focus of calcification. No hydronephrosis. STOMACH AND BOWEL: Colonic diverticulosis without findings of acute diverticulitis. Fluid distention with wall hyperemia involving numerous loops of small bowel APPENDIX: No findings to suggest appendicitis. ABDOMINOPELVIC CAVITY: No ascites. No pneumoperitoneum. No lymphadenopathy. VESSELS: Unremarkable for patient's age. PELVIS REPRODUCTIVE ORGANS: Post hysterectomy. URINARY BLADDER: Unremarkable. ABDOMINAL WALL/INGUINAL REGIONS: Unremarkable. BONES: No acute fracture or suspicious osseous lesion.     Impression: 4.8 cm low-attenuation focus with rim enhancement at the medial right lower lobe at site of previously treated previously treated lung mass. This finding may reflect recurrence of patient's previously treated lung mass and/or superinfection and developing abscess. Oncology consult and with possible tissue sampling recommended Findings of mild enteritis Decreasing size of pancreatic cystic lesion The study was marked in EPIC for immediate notification. Workstation performed: [id number]      Giles Orr MD  [facility] Palliative and Supportive Care  [phone]    Portions of this document may have been created using  "dictation software and as such some \"sound alike\" terms may have been generated by the system. Do not hesitate to contact me with any questions or clarifications.   "

## 2024-05-20 NOTE — ASSESSMENT & PLAN NOTE
Wt Readings from Last 3 Encounters:   05/20/24 71.4 kg (157 lb 8 oz)   05/10/24 71.7 kg (158 lb)   04/26/24 72.3 kg (159 lb 6.3 oz)   Patient has some lower extremity edema; management per Cardiology or Medical Oncology. May be impacting fatigue.

## 2024-05-20 NOTE — ASSESSMENT & PLAN NOTE
Emotional / psychosocial support provided today.  Continue BH regimen including buspirone, venlafaxine. Gabapentin may also help here.

## 2024-05-20 NOTE — ASSESSMENT & PLAN NOTE
ACP: Patient has completed advanced directives (POLST and Power of ). In EMR.  Reviewed notes (Medical Oncology, ED, PCP), labs (5/8/24 Cr 1.15, GFR 46, alb 3.5, Hb 9.0), imaging (4/29/24 CTA ED chest PE study + CXR, 4/26/24 [facility]AP). See below for more data.  Return in about 4 months (around 9/20/2024).  Medication safety issues addressed - no driving under the influence of narcotics (including opioids), watch for adverse effects including AMS or respiratory depression (slowed breathing), keep medications stored in a safe/locked environment, do not use alcohol while opioids or other narcotics are in one's system, do not travel with more than the minimum number of tablets or capsules required for the trip.  I have personally queried the patient's controlled substance dispensing history in the Prescription Drug Monitoring Program in compliance with regulations before I have prescribed any controlled substances. The prescription history is consistent with prescribed therapy and our practice policies.

## 2024-05-20 NOTE — ASSESSMENT & PLAN NOTE
Progressive, interfering with quality of life. Possibly related to sotorasib as patient felt improved energy during a holiday from sotorasib. Patient declines today's offer of referral to Oncology PT, but will consider.

## 2024-05-20 NOTE — PATIENT INSTRUCTIONS
It was good to see you today. Thank you for coming in.    Call me if or when you like to have a referral to physical therapy to improve your energy, and improve your strength.  Talk w/ your cancer team abut the side effects of sotorasib and taking holidays to reduce symptoms, alternate agents, etc.  Use Glucerna or other low-carb protein shakes to supplement or replace meals if your appetite is low.  For neck / joint /  back pain:  Tylenol, 1000mg three times per day every day, can be a safe and effective way to reduce chronic pain.  You may consider topical products for pain as well (over-the-counter lidocaine, CBD, capsaicin +/- menthol, diclofenac). Brand names include Salonpas, Biofreeze, Aspercreme, Icy Hot, Voltaren, etc. These can be patches, creams, lotions, or roll-on gels.  Try a heating pad or ice pack (you can alternate these about 30 minutes apart) for neck and back pain. Do not use a heating pad for more than 20 minutes out of any single hour.  Do not use topical product under a heating pad; ensure skin is clean and dry.  Oxycodone is for cancer-related pain; it's not a sleep medication but can help you rest better if pain prevents sleep.  Return in about 4 months (around 9/20/2024).  Call us for refills on medications that we supply, as needed.  If something changes and you need to come in sooner, please call our office.    PRESCRIPTION REFILL REMINDER:  All medication refills should be requested prior to Noon on Friday. Any refill requests after noon on Friday would be addressed the following Monday.    MEDICATION SAFETY ISSUES:   Do not drive under the influence of narcotics (including opioids), watch for adverse effects including confusion / altered mental status / respiratory depression (slowed breathing), keep medications stored in a safe/locked environment, do not use alcohol while opioids or other narcotics are in your system. Do not travel with more than the minimum number of tablets or  capsules required for the trip.

## 2024-05-21 ENCOUNTER — OFFICE VISIT (OUTPATIENT)
Dept: FAMILY MEDICINE CLINIC | Facility: CLINIC | Age: 74
End: 2024-05-21
Payer: MEDICARE

## 2024-05-21 VITALS
BODY MASS INDEX: 28.12 KG/M2 | DIASTOLIC BLOOD PRESSURE: 84 MMHG | OXYGEN SATURATION: 99 % | HEIGHT: 62 IN | TEMPERATURE: 97.7 F | WEIGHT: 152.8 LBS | HEART RATE: 89 BPM | RESPIRATION RATE: 18 BRPM | SYSTOLIC BLOOD PRESSURE: 126 MMHG

## 2024-05-21 DIAGNOSIS — N18.30 TYPE 2 DIABETES MELLITUS WITH STAGE 3 CHRONIC KIDNEY DISEASE, WITHOUT LONG-TERM CURRENT USE OF INSULIN, UNSPECIFIED WHETHER STAGE 3A OR 3B CKD (HCC): ICD-10-CM

## 2024-05-21 DIAGNOSIS — I50.32 CHRONIC DIASTOLIC HEART FAILURE (HCC): ICD-10-CM

## 2024-05-21 DIAGNOSIS — C41.9 MALIGNANT NEOPLASM OF BONE WITH METASTASES (HCC): ICD-10-CM

## 2024-05-21 DIAGNOSIS — D63.1 ANEMIA IN STAGE 3B CHRONIC KIDNEY DISEASE  (HCC): ICD-10-CM

## 2024-05-21 DIAGNOSIS — F41.9 ANXIETY: ICD-10-CM

## 2024-05-21 DIAGNOSIS — G62.0 NEUROPATHY DUE TO CHEMOTHERAPEUTIC DRUG (HCC): ICD-10-CM

## 2024-05-21 DIAGNOSIS — D70.1 CHEMOTHERAPY INDUCED NEUTROPENIA (HCC): ICD-10-CM

## 2024-05-21 DIAGNOSIS — T45.1X5A NEUROPATHY DUE TO CHEMOTHERAPEUTIC DRUG (HCC): ICD-10-CM

## 2024-05-21 DIAGNOSIS — E78.5 DYSLIPIDEMIA: ICD-10-CM

## 2024-05-21 DIAGNOSIS — Z00.00 MEDICARE ANNUAL WELLNESS VISIT, SUBSEQUENT: Primary | ICD-10-CM

## 2024-05-21 DIAGNOSIS — T45.1X5A CHEMOTHERAPY INDUCED NEUTROPENIA (HCC): ICD-10-CM

## 2024-05-21 DIAGNOSIS — M25.50 JOINT PAIN: ICD-10-CM

## 2024-05-21 DIAGNOSIS — F32.1 CURRENT MODERATE EPISODE OF MAJOR DEPRESSIVE DISORDER WITHOUT PRIOR EPISODE (HCC): ICD-10-CM

## 2024-05-21 DIAGNOSIS — C34.92 ADENOCARCINOMA OF LEFT LUNG, STAGE 4 (HCC): ICD-10-CM

## 2024-05-21 DIAGNOSIS — F32.5 MAJOR DEPRESSIVE DISORDER WITH SINGLE EPISODE, IN FULL REMISSION (HCC): ICD-10-CM

## 2024-05-21 DIAGNOSIS — N18.32 ANEMIA IN STAGE 3B CHRONIC KIDNEY DISEASE  (HCC): ICD-10-CM

## 2024-05-21 DIAGNOSIS — Z51.5 PALLIATIVE CARE PATIENT: ICD-10-CM

## 2024-05-21 DIAGNOSIS — E11.22 TYPE 2 DIABETES MELLITUS WITH STAGE 3 CHRONIC KIDNEY DISEASE, WITHOUT LONG-TERM CURRENT USE OF INSULIN, UNSPECIFIED WHETHER STAGE 3A OR 3B CKD (HCC): ICD-10-CM

## 2024-05-21 DIAGNOSIS — R60.0 BILATERAL LEG EDEMA: ICD-10-CM

## 2024-05-21 PROCEDURE — G0439 PPPS, SUBSEQ VISIT: HCPCS | Performed by: FAMILY MEDICINE

## 2024-05-21 PROCEDURE — 99214 OFFICE O/P EST MOD 30 MIN: CPT | Performed by: FAMILY MEDICINE

## 2024-05-21 RX ORDER — VENLAFAXINE 100 MG/1
100 TABLET ORAL
Qty: 90 TABLET | Refills: 3 | Status: ON HOLD | OUTPATIENT
Start: 2024-05-21

## 2024-05-21 RX ORDER — POTASSIUM CHLORIDE 750 MG/1
10 TABLET, EXTENDED RELEASE ORAL DAILY
Qty: 90 TABLET | Refills: 3 | Status: ON HOLD | OUTPATIENT
Start: 2024-05-21

## 2024-05-21 NOTE — ASSESSMENT & PLAN NOTE
Lab Results   Component Value Date    EGFR 46 05/08/2024    EGFR 60 04/29/2024    EGFR 58 04/26/2024    CREATININE 1.15 05/08/2024    CREATININE 0.93 04/29/2024    CREATININE 0.96 04/26/2024   Has iron studies to do   The left coronary artery was selectively engaged and injected. Multiple views of the injected vessel were taken.

## 2024-05-21 NOTE — ASSESSMENT & PLAN NOTE
Wt Readings from Last 3 Encounters:   05/21/24 69.3 kg (152 lb 12.8 oz)   05/20/24 71.4 kg (157 lb 8 oz)   05/10/24 71.7 kg (158 lb)     Weight decreased  On lasix 20mg  Edema in feet improved but still present

## 2024-05-21 NOTE — PROGRESS NOTES
Ambulatory Visit  Name: Melany Griffith      : 1950      MRN: [id number]  Encounter Provider: Salina Dwyre DO  Encounter Date: 2024   Encounter department: [facility]    Assessment & Plan   1. Medicare annual wellness visit, subsequent  Assessment & Plan:  Up to date   2. Type 2 diabetes mellitus with stage 3 chronic kidney disease, without long-term current use of insulin, unspecified whether stage 3a or 3b CKD (HCC)  Assessment & Plan:  Stable   No changes  Lab Results   Component Value Date    HGBA1C 7.2 (H) 2024     Orders:  -     Hemoglobin A1C; Future; Expected date: 2024  3. Malignant neoplasm of bone with metastases (HCC)  Assessment & Plan:  Has pet scan scheduled  4. Anemia in stage 3b chronic kidney disease  (HCC)  Assessment & Plan:  Lab Results   Component Value Date    EGFR 46 2024    EGFR 60 2024    EGFR 58 2024    CREATININE 1.15 2024    CREATININE 0.93 2024    CREATININE 0.96 2024   Has iron studies to do  5. Current moderate episode of major depressive disorder without prior episode (HCC)  Assessment & Plan:  Feeling low  Discussed increasing to 150mg  6. Adenocarcinoma of left lung, stage 4 (HCC)  -     venlafaxine (EFFEXOR) 100 MG tablet; Take 1 tablet (100 mg total) by mouth daily at bedtime  7. 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HCC)  -     venlafaxine (EFFEXOR) 100 MG tablet; Take 1 tablet (100 mg total) by mouth daily at bedtime  8. Palliative care patient  -     venlafaxine (EFFEXOR) 100 MG tablet; Take 1 tablet (100 mg total) by mouth daily at bedtime  9. Major depressive disorder with single episode, in full remission (HCC)  -     venlafaxine (EFFEXOR) 100 MG tablet; Take 1 tablet (100 mg total) by mouth daily at bedtime  10. Anxiety  -     venlafaxine (EFFEXOR) 100 MG tablet; Take 1 tablet (100 mg total) by mouth daily at bedtime  11. Joint pain  -     venlafaxine (EFFEXOR) 100 MG tablet; Take 1  tablet (100 mg total) by mouth daily at bedtime  12. Chemotherapy induced neutropenia (HCC)  Assessment & Plan:  Seeing heme  13. Dyslipidemia  Assessment & Plan:  stable  Orders:  -     Lipid Panel with Direct LDL reflex; Future; Expected date: 11/17/2024  14. Chronic diastolic heart failure (HCC)  Assessment & Plan:  Wt Readings from Last 3 Encounters:   05/21/24 69.3 kg (152 lb 12.8 oz)   05/20/24 71.4 kg (157 lb 8 oz)   05/10/24 71.7 kg (158 lb)     Weight decreased  On lasix 20mg  Edema in feet improved but still present        Orders:  -     Compression Stocking  15. Bilateral leg edema  -     potassium chloride (Klor-Con M10) 10 mEq tablet; Take 1 tablet (10 mEq total) by mouth daily      Depression Screening and Follow-up Plan: Patient's depression screening was positive with a PHQ-9 score of 7. Continue regular follow-up with their mental health provider who is managing their mental health condition(s).       Preventive health issues were discussed with patient, and age appropriate screening tests were ordered as noted in patient's After Visit Summary. Personalized health advice and appropriate referrals for health education or preventive services given if needed, as noted in patient's After Visit Summary.    History of Present Illness     Here for wellness  Feeling tired  Has lost weight  Decreased appetite  Diarrhea is better  On lasix for edema lost several pound in water weight       Patient Care Team:  Salina Dwyer DO as PCP - General (Family Medicine)  MD Guerrero Santos MD Yacoub Faroun, MD William Richard Burfeind, MD Tiana Shekari, DO Kimberly A Yoon, CRNP Ric Baxter, MD Holli Marie Warholic, DO Nancy Fox MD (Surgical Oncology)  Kosta Fuchs MD (Radiation Oncology)  Giles Orr MD (Palliative Care)  Annelise Darling PA-C (Hematology and Oncology)    Review of Systems   Constitutional:  Positive for fatigue.   HENT:  Negative.     Eyes: Negative.    Respiratory: Negative.     Cardiovascular:  Positive for leg swelling.   Musculoskeletal:  Positive for arthralgias, back pain and gait problem.   Allergic/Immunologic: Negative.      Medical History Reviewed by provider this encounter:       Annual Wellness Visit Questionnaire   Melany is here for her Subsequent Wellness visit.     Health Risk Assessment:   Patient rates overall health as fair. Patient feels that their physical health rating is same. Patient is very satisfied with their life. Eyesight was rated as same. Hearing was rated as same. Patient feels that their emotional and mental health rating is same. Patients states they are never, rarely angry. Patient states they are sometimes unusually tired/fatigued. Pain experienced in the last 7 days has been some. Patient's pain rating has been 4/10. Patient states that she has experienced no weight loss or gain in last 6 months.     Depression Screening:   PHQ-9 Score: 7      Fall Risk Screening:   In the past year, patient has experienced: history of falling in past year    Number of falls: 2 or more  Injured during fall?: No    Feels unsteady when standing or walking?: Yes    Worried about falling?: Yes      Urinary Incontinence Screening:   Patient has leaked urine accidently in the last six months.     Home Safety:  Patient does not have trouble with stairs inside or outside of their home. Patient has working smoke alarms and has working carbon monoxide detector. Home safety hazards include: none.     Nutrition:   Current diet is Diabetic and No Added Salt.     Medications:   Patient is currently taking over-the-counter supplements. OTC medications include: see medication list. Patient is able to manage medications.     Activities of Daily Living (ADLs)/Instrumental Activities of Daily Living (IADLs):   Walk and transfer into and out of bed and chair?: Yes  Dress and groom yourself?: Yes    Bathe or shower yourself?: Yes     Feed yourself? Yes  Do your laundry/housekeeping?: Yes  Manage your money, pay your bills and track your expenses?: Yes  Make your own meals?: Yes    Do your own shopping?: Yes    Previous Hospitalizations:   Any hospitalizations or ED visits within the last 12 months?: No      PREVENTIVE SCREENINGS      Cardiovascular Screening:    General: Screening Not Indicated and History Lipid Disorder      Diabetes Screening:     General: Screening Not Indicated and History Diabetes      Colorectal Cancer Screening:     General: Screening Current      Cervical Cancer Screening:    General: Screening Not Indicated      Lung Cancer Screening:     General: Screening Not Indicated and History Lung Cancer      Hepatitis C Screening:    General: Screening Current    Screening, Brief Intervention, and Referral to Treatment (SBIRT)    Screening      AUDIT-C Screenin) How often did you have a drink containing alcohol in the past year? never  2) How many drinks did you have on a typical day when you were drinking in the past year? 0  3) How often did you have 6 or more drinks on one occasion in the past year? never    AUDIT-C Score: 0  Interpretation: Score 0-2 (female): Negative screen for alcohol misuse    Single Item Drug Screening:  How often have you used an illegal drug (including marijuana) or a prescription medication for non-medical reasons in the past year? never    Single Item Drug Screen Score: 0  Interpretation: Negative screen for possible drug use disorder    Social Determinants of Health     Financial Resource Strain: Low Risk  (2023)    Overall Financial Resource Strain (CARDIA)    • Difficulty of Paying Living Expenses: Not hard at all   Food Insecurity: No Food Insecurity (2024)    Hunger Vital Sign    • Worried About Running Out of Food in the Last Year: Never true    • Ran Out of Food in the Last Year: Never true   Transportation Needs: No Transportation Needs (2024)    PRAPARE -  "Transportation    • Lack of Transportation (Medical): No    • Lack of Transportation (Non-Medical): No   Housing Stability: Low Risk  (5/21/2024)    Housing Stability Vital Sign    • Unable to Pay for Housing in the Last Year: No    • Number of Times Moved in the Last Year: 0    • Homeless in the Last Year: No   Utilities: Not At Risk (5/21/2024)    [facility] Utilities    • Threatened with loss of utilities: No     No results found.    Objective     /84 (BP Location: Left arm, Patient Position: Sitting, Cuff Size: Standard)   Pulse 89   Temp 97.7 °F (36.5 °C) (Tympanic)   Resp 18   Ht 5' 2\" (1.575 m)   Wt 69.3 kg (152 lb 12.8 oz)   LMP  (LMP Unknown)   SpO2 99%   BMI 27.95 kg/m²     Physical Exam  Vitals and nursing note reviewed.   Constitutional:       Appearance: Normal appearance. She is ill-appearing.   HENT:      Head: Normocephalic and atraumatic.   Eyes:      Extraocular Movements: Extraocular movements intact.      Pupils: Pupils are equal, round, and reactive to light.   Cardiovascular:      Rate and Rhythm: Normal rate and regular rhythm.      Pulses: Normal pulses.      Heart sounds: Normal heart sounds.   Pulmonary:      Effort: Pulmonary effort is normal.      Breath sounds: Normal breath sounds.   Abdominal:      General: Abdomen is flat.      Palpations: Abdomen is soft.   Musculoskeletal:      Cervical back: Normal range of motion and neck supple.      Right lower leg: Edema present.      Left lower leg: Edema present.   Neurological:      General: No focal deficit present.      Mental Status: She is alert and oriented to person, place, and time.   Psychiatric:         Mood and Affect: Mood normal.         Behavior: Behavior normal.         Thought Content: Thought content normal.         Judgment: Judgment normal.       Administrative Statements   I have spent a total time of 30 minutes on 05/21/24 In caring for this patient including Prognosis.        "

## 2024-05-21 NOTE — PATIENT INSTRUCTIONS

## 2024-05-28 ENCOUNTER — APPOINTMENT (EMERGENCY)
Dept: RADIOLOGY | Facility: HOSPITAL | Age: 74
DRG: 682 | End: 2024-05-28
Payer: MEDICARE

## 2024-05-28 ENCOUNTER — APPOINTMENT (EMERGENCY)
Dept: CT IMAGING | Facility: HOSPITAL | Age: 74
DRG: 682 | End: 2024-05-28
Payer: MEDICARE

## 2024-05-28 ENCOUNTER — HOSPITAL ENCOUNTER (INPATIENT)
Facility: HOSPITAL | Age: 74
LOS: 10 days | Discharge: NURSING, REHAB OR LONG-TERM CARE | DRG: 682 | End: 2024-06-08
Attending: STUDENT IN AN ORGANIZED HEALTH CARE EDUCATION/TRAINING PROGRAM | Admitting: INTERNAL MEDICINE
Payer: MEDICARE

## 2024-05-28 DIAGNOSIS — E44.0 MODERATE PROTEIN-CALORIE MALNUTRITION (HCC): ICD-10-CM

## 2024-05-28 DIAGNOSIS — E87.1 HYPONATREMIA: ICD-10-CM

## 2024-05-28 DIAGNOSIS — R53.1 GENERALIZED WEAKNESS: ICD-10-CM

## 2024-05-28 DIAGNOSIS — K59.00 CONSTIPATION, UNSPECIFIED CONSTIPATION TYPE: ICD-10-CM

## 2024-05-28 DIAGNOSIS — D72.829 LEUKOCYTOSIS: ICD-10-CM
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
## 2024-05-28 DIAGNOSIS — R27.8 COORDINATION ABNORMAL: ICD-10-CM

## 2024-05-28 DIAGNOSIS — N17.9 AKI (ACUTE KIDNEY INJURY) (HCC): Primary | ICD-10-CM

## 2024-05-28 DIAGNOSIS — R53.1 WEAKNESS: ICD-10-CM

## 2024-05-28 DIAGNOSIS — C41.9 MALIGNANT NEOPLASM OF BONE WITH METASTASES (HCC): ICD-10-CM

## 2024-05-28 DIAGNOSIS — C34.12 MALIGNANT NEOPLASM OF UPPER LOBE OF LEFT LUNG (HCC): ICD-10-CM

## 2024-05-28 DIAGNOSIS — I10 PRIMARY HYPERTENSION: ICD-10-CM

## 2024-05-28 DIAGNOSIS — R63.0 LOSS OF APPETITE: ICD-10-CM

## 2024-05-28 PROBLEM — S31.000A SACRAL WOUND: Status: ACTIVE | Noted: 2024-05-28

## 2024-05-28 PROBLEM — M25.511 ACUTE PAIN OF RIGHT SHOULDER: Status: ACTIVE | Noted: 2024-05-28

## 2024-05-28 LAB
ABO GROUP BLD: NORMAL
ALBUMIN SERPL BCP-MCNC: 3.2 G/DL (ref 3.5–5)
ALP SERPL-CCNC: 95 U/L (ref 34–104)
ALT SERPL W P-5'-P-CCNC: 11 U/L (ref 7–52)
ANION GAP SERPL CALCULATED.3IONS-SCNC: 12 MMOL/L (ref 4–13)
AST SERPL W P-5'-P-CCNC: 13 U/L (ref 13–39)
BASE EXCESS BLDA CALC-SCNC: 1 MMOL/L (ref -2–3)
BASE EXCESS BLDA CALC-SCNC: 2 MMOL/L (ref -2–3)
BASOPHILS # BLD AUTO: 0.06 THOUSANDS/ÂΜL (ref 0–0.1)
BASOPHILS NFR BLD AUTO: 0 % (ref 0–1)
BILIRUB SERPL-MCNC: 0.57 MG/DL (ref 0.2–1)
BLD GP AB SCN SERPL QL: NEGATIVE
BUN SERPL-MCNC: 37 MG/DL (ref 5–25)
CA-I BLD-SCNC: 1.13 MMOL/L (ref 1.12–1.32)
CA-I BLD-SCNC: 1.16 MMOL/L (ref 1.12–1.32)
CALCIUM ALBUM COR SERPL-MCNC: 9.8 MG/DL (ref 8.3–10.1)
CALCIUM SERPL-MCNC: 9.2 MG/DL (ref 8.4–10.2)
CARDIAC TROPONIN I PNL SERPL HS: 13 NG/L (ref 8–18)
CHLORIDE SERPL-SCNC: 95 MMOL/L (ref 96–108)
CO2 SERPL-SCNC: 23 MMOL/L (ref 21–32)
CREAT SERPL-MCNC: 1.57 MG/DL (ref 0.6–1.3)
EOSINOPHIL # BLD AUTO: 0.06 THOUSAND/ÂΜL (ref 0–0.61)
EOSINOPHIL NFR BLD AUTO: 0 % (ref 0–6)
ERYTHROCYTE [DISTWIDTH] IN BLOOD BY AUTOMATED COUNT: 18.4 % (ref 11.6–15.1)
GFR SERPL CREATININE-BSD FRML MDRD: 32 ML/MIN/1.73SQ M
GLUCOSE SERPL-MCNC: 117 MG/DL (ref 65–140)
GLUCOSE SERPL-MCNC: 118 MG/DL (ref 65–140)
GLUCOSE SERPL-MCNC: 118 MG/DL (ref 65–140)
GLUCOSE SERPL-MCNC: 148 MG/DL (ref 65–140)
HCO3 BLDA-SCNC: 24.3 MMOL/L (ref 24–30)
HCO3 BLDA-SCNC: 24.5 MMOL/L (ref 24–30)
HCT VFR BLD AUTO: 33.9 % (ref 34.8–46.1)
HCT VFR BLD CALC: 30 % (ref 34.8–46.1)
HCT VFR BLD CALC: 33 % (ref 34.8–46.1)
HGB BLD-MCNC: 9.9 G/DL (ref 11.5–15.4)
HGB BLDA-MCNC: 10.2 G/DL (ref 11.5–15.4)
HGB BLDA-MCNC: 11.2 G/DL (ref 11.5–15.4)
IMM GRANULOCYTES # BLD AUTO: 0.18 THOUSAND/UL (ref 0–0.2)
IMM GRANULOCYTES NFR BLD AUTO: 1 % (ref 0–2)
LYMPHOCYTES # BLD AUTO: 0.77 THOUSANDS/ÂΜL (ref 0.6–4.47)
LYMPHOCYTES NFR BLD AUTO: 5 % (ref 14–44)
MCH RBC QN AUTO: 22.1 PG (ref 26.8–34.3)
MCHC RBC AUTO-ENTMCNC: 29.2 G/DL (ref 31.4–37.4)
MCV RBC AUTO: 76 FL (ref 82–98)
MONOCYTES # BLD AUTO: 0.79 THOUSAND/ÂΜL (ref 0.17–1.22)
MONOCYTES NFR BLD AUTO: 5 % (ref 4–12)
NEUTROPHILS # BLD AUTO: 14.97 THOUSANDS/ÂΜL (ref 1.85–7.62)
NEUTS SEG NFR BLD AUTO: 89 % (ref 43–75)
NRBC BLD AUTO-RTO: 0 /100 WBCS
PCO2 BLD: 25 MMOL/L (ref 21–32)
PCO2 BLD: 25 MMOL/L (ref 21–32)
PCO2 BLD: 31.5 MM HG (ref 42–50)
PCO2 BLD: 31.9 MM HG (ref 42–50)
PH BLD: 7.49 [PH] (ref 7.3–7.4)
PH BLD: 7.49 [PH] (ref 7.3–7.4)
PLATELET # BLD AUTO: 456 THOUSANDS/UL (ref 149–390)
PMV BLD AUTO: 10 FL (ref 8.9–12.7)
PO2 BLD: 17 MM HG (ref 35–45)
PO2 BLD: 17 MM HG (ref 35–45)
POTASSIUM BLD-SCNC: 4.5 MMOL/L (ref 3.5–5.3)
POTASSIUM BLD-SCNC: 4.5 MMOL/L (ref 3.5–5.3)
POTASSIUM SERPL-SCNC: 4.8 MMOL/L (ref 3.5–5.3)
PROT SERPL-MCNC: 6.6 G/DL (ref 6.4–8.4)
RBC # BLD AUTO: 4.48 MILLION/UL (ref 3.81–5.12)
RH BLD: POSITIVE
SAO2 % BLD FROM PO2: 30 % (ref 60–85)
SAO2 % BLD FROM PO2: 30 % (ref 60–85)
SODIUM BLD-SCNC: 129 MMOL/L (ref 136–145)
SODIUM BLD-SCNC: 129 MMOL/L (ref 136–145)
SODIUM SERPL-SCNC: 130 MMOL/L (ref 135–147)
SPECIMEN EXPIRATION DATE: NORMAL
SPECIMEN SOURCE: ABNORMAL
SPECIMEN SOURCE: ABNORMAL
WBC # BLD AUTO: 16.83 THOUSAND/UL (ref 4.31–10.16)

## 2024-05-28 PROCEDURE — 82330 ASSAY OF CALCIUM: CPT

## 2024-05-28 PROCEDURE — 85025 COMPLETE CBC W/AUTO DIFF WBC: CPT | Performed by: STUDENT IN AN ORGANIZED HEALTH CARE EDUCATION/TRAINING PROGRAM

## 2024-05-28 PROCEDURE — 73030 X-RAY EXAM OF SHOULDER: CPT

## 2024-05-28 PROCEDURE — 82947 ASSAY GLUCOSE BLOOD QUANT: CPT

## 2024-05-28 PROCEDURE — 70450 CT HEAD/BRAIN W/O DYE: CPT

## 2024-05-28 PROCEDURE — 82948 REAGENT STRIP/BLOOD GLUCOSE: CPT

## 2024-05-28 PROCEDURE — 99291 CRITICAL CARE FIRST HOUR: CPT | Performed by: STUDENT IN AN ORGANIZED HEALTH CARE EDUCATION/TRAINING PROGRAM

## 2024-05-28 PROCEDURE — 76604 US EXAM CHEST: CPT | Performed by: PHYSICIAN ASSISTANT

## 2024-05-28 PROCEDURE — 76705 ECHO EXAM OF ABDOMEN: CPT | Performed by: PHYSICIAN ASSISTANT

## 2024-05-28 PROCEDURE — 84132 ASSAY OF SERUM POTASSIUM: CPT

## 2024-05-28 PROCEDURE — 72125 CT NECK SPINE W/O DYE: CPT

## 2024-05-28 PROCEDURE — 84295 ASSAY OF SERUM SODIUM: CPT

## 2024-05-28 PROCEDURE — 80053 COMPREHEN METABOLIC PANEL: CPT | Performed by: STUDENT IN AN ORGANIZED HEALTH CARE EDUCATION/TRAINING PROGRAM

## 2024-05-28 PROCEDURE — 71045 X-RAY EXAM CHEST 1 VIEW: CPT

## 2024-05-28 PROCEDURE — 86850 RBC ANTIBODY SCREEN: CPT | Performed by: STUDENT IN AN ORGANIZED HEALTH CARE EDUCATION/TRAINING PROGRAM

## 2024-05-28 PROCEDURE — 84484 ASSAY OF TROPONIN QUANT: CPT | Performed by: STUDENT IN AN ORGANIZED HEALTH CARE EDUCATION/TRAINING PROGRAM

## 2024-05-28 PROCEDURE — 86900 BLOOD TYPING SEROLOGIC ABO: CPT | Performed by: STUDENT IN AN ORGANIZED HEALTH CARE EDUCATION/TRAINING PROGRAM

## 2024-05-28 PROCEDURE — 85014 HEMATOCRIT: CPT

## 2024-05-28 PROCEDURE — 36415 COLL VENOUS BLD VENIPUNCTURE: CPT | Performed by: PHYSICIAN ASSISTANT

## 2024-05-28 PROCEDURE — 99284 EMERGENCY DEPT VISIT MOD MDM: CPT

## 2024-05-28 PROCEDURE — TCMXX: Performed by: FAMILY MEDICINE

## 2024-05-28 PROCEDURE — 86901 BLOOD TYPING SEROLOGIC RH(D): CPT | Performed by: STUDENT IN AN ORGANIZED HEALTH CARE EDUCATION/TRAINING PROGRAM

## 2024-05-28 PROCEDURE — 93308 TTE F-UP OR LMTD: CPT | Performed by: PHYSICIAN ASSISTANT

## 2024-05-28 PROCEDURE — 82803 BLOOD GASES ANY COMBINATION: CPT

## 2024-05-28 PROCEDURE — 99223 1ST HOSP IP/OBS HIGH 75: CPT | Performed by: INTERNAL MEDICINE

## 2024-05-28 RX ORDER — ATORVASTATIN CALCIUM 40 MG/1
40 TABLET, FILM COATED ORAL DAILY
Status: DISCONTINUED | OUTPATIENT
Start: 2024-05-29 | End: 2024-06-08 | Stop reason: HOSPADM

## 2024-05-28 RX ORDER — FOLIC ACID 1 MG/1
1 TABLET ORAL DAILY
Status: DISCONTINUED | OUTPATIENT
Start: 2024-05-29 | End: 2024-06-08 | Stop reason: HOSPADM

## 2024-05-28 RX ORDER — SOTALOL HYDROCHLORIDE 80 MG/1
80 TABLET ORAL 2 TIMES DAILY
Status: DISCONTINUED | OUTPATIENT
Start: 2024-05-28 | End: 2024-05-28

## 2024-05-28 RX ORDER — POLYETHYLENE GLYCOL 3350 17 G/17G
17 POWDER, FOR SOLUTION ORAL DAILY
Status: DISCONTINUED | OUTPATIENT
Start: 2024-05-29 | End: 2024-06-08 | Stop reason: HOSPADM

## 2024-05-28 RX ORDER — INSULIN LISPRO 100 [IU]/ML
1-5 INJECTION, SOLUTION INTRAVENOUS; SUBCUTANEOUS
Status: DISCONTINUED | OUTPATIENT
Start: 2024-05-29 | End: 2024-06-08 | Stop reason: HOSPADM

## 2024-05-28 RX ORDER — DIPHENHYDRAMINE HCL 25 MG
25 TABLET ORAL
Status: DISCONTINUED | OUTPATIENT
Start: 2024-05-28 | End: 2024-05-31

## 2024-05-28 RX ORDER — BUSPIRONE HYDROCHLORIDE 5 MG/1
7.5 TABLET ORAL
Status: DISCONTINUED | OUTPATIENT
Start: 2024-05-28 | End: 2024-06-08 | Stop reason: HOSPADM

## 2024-05-28 RX ORDER — GABAPENTIN 100 MG/1
200 CAPSULE ORAL 2 TIMES DAILY
Status: DISCONTINUED | OUTPATIENT
Start: 2024-05-28 | End: 2024-06-08 | Stop reason: HOSPADM

## 2024-05-28 RX ORDER — PROCHLORPERAZINE MALEATE 10 MG
10 TABLET ORAL 3 TIMES DAILY PRN
Status: DISCONTINUED | OUTPATIENT
Start: 2024-05-28 | End: 2024-06-08 | Stop reason: HOSPADM

## 2024-05-28 RX ORDER — SENNOSIDES 8.6 MG
1 TABLET ORAL DAILY
Status: DISCONTINUED | OUTPATIENT
Start: 2024-05-28 | End: 2024-06-04

## 2024-05-28 RX ORDER — INSULIN LISPRO 100 [IU]/ML
1-5 INJECTION, SOLUTION INTRAVENOUS; SUBCUTANEOUS
Status: DISCONTINUED | OUTPATIENT
Start: 2024-05-28 | End: 2024-06-08 | Stop reason: HOSPADM

## 2024-05-28 RX ORDER — ACETAMINOPHEN 325 MG/1
975 TABLET ORAL EVERY 8 HOURS PRN
Status: DISCONTINUED | OUTPATIENT
Start: 2024-05-28 | End: 2024-06-08 | Stop reason: HOSPADM

## 2024-05-28 RX ORDER — PANTOPRAZOLE SODIUM 40 MG/1
40 TABLET, DELAYED RELEASE ORAL
Status: DISCONTINUED | OUTPATIENT
Start: 2024-05-29 | End: 2024-06-08 | Stop reason: HOSPADM

## 2024-05-28 RX ORDER — SODIUM CHLORIDE 9 MG/ML
50 INJECTION, SOLUTION INTRAVENOUS CONTINUOUS
Status: DISCONTINUED | OUTPATIENT
Start: 2024-05-28 | End: 2024-05-29

## 2024-05-28 RX ADMIN — APIXABAN 5 MG: 5 TABLET, FILM COATED ORAL at 20:02

## 2024-05-28 RX ADMIN — SODIUM CHLORIDE 50 ML/HR: 0.9 INJECTION, SOLUTION INTRAVENOUS at 20:06

## 2024-05-28 RX ADMIN — VENLAFAXINE 100 MG: 37.5 TABLET ORAL at 22:30

## 2024-05-28 RX ADMIN — METOPROLOL TARTRATE 25 MG: 25 TABLET, FILM COATED ORAL at 20:02

## 2024-05-28 RX ADMIN — GABAPENTIN 200 MG: 100 CAPSULE ORAL at 20:02

## 2024-05-28 RX ADMIN — SENNOSIDES 8.6 MG: 8.6 TABLET, FILM COATED ORAL at 20:03

## 2024-05-28 RX ADMIN — BUSPIRONE HYDROCHLORIDE 7.5 MG: 5 TABLET ORAL at 22:29

## 2024-05-28 NOTE — ASSESSMENT & PLAN NOTE
Small wound present in the R sacral/buttock area. Small amount of scabbing present, no current discharge. Low suspicion of infection.     Plan  Wound care consult  No need for abx at this time

## 2024-05-28 NOTE — ASSESSMENT & PLAN NOTE
Malnutrition Findings:       She endorsed poor appetite and significant weight loss over the past 2 months, not completely sure how much. Nutrition consulted, input appreciated. Ensure ordered 10/2/HS.                           BMI Findings:           There is no height or weight on file to calculate BMI.

## 2024-05-28 NOTE — ASSESSMENT & PLAN NOTE
Wt Readings from Last 3 Encounters:   05/21/24 69.3 kg (152 lb 12.8 oz)   05/20/24 71.4 kg (157 lb 8 oz)   05/10/24 71.7 kg (158 lb)     Last Echo in 2022 showed EF 60, grade 1 diastolic dysfunction.   Lungs clear on exam, no SOB. 3+ lower extremitiy edema on exam, consider likely medication side effect from Sotorasib.   Home Lasix recently increased to 40 mg daily prn for swelling    Plan  Gentle hydration for ADITYA  Home lasix held due to ADITYA  Continue to monitor volume status. Stop fluids if volume overload suspected.

## 2024-05-28 NOTE — H&P
[facility]  H&P  Name: Melany Griffith 73 y.o. female I MRN: [id number]  Unit/Bed#: S -01 I Date of Admission: 5/28/2024   Date of Service: 5/29/2024 I Hospital Day: 0      Assessment & Plan   Fall  Assessment & Plan  - Status post fall due to collapse from weakness.  Patient did not have any acute traumatic injuries identified during initial evaluation.    - Fall precautions.  - Internal Medicine consultation for admission and workup of weakness which is likely secondary to failure to thrive in setting of poor oral intake and stage IV lung cancer.   - PT and OT evaluation and treatment as indicated.  - Case Management consultation for disposition planning.      Ambulatory dysfunction  Assessment & Plan  - Acute on chronic ambulatory dysfunction secondary to worsening weakness.  - Fall precautions.  - PT and OT evaluation and treatment as indicated.    Acute pain of right shoulder  Assessment & Plan  - Acute pain in the right shoulder/scapula without evidence of acute traumatic injury on exam or imaging workup.  - Analgesia as needed.  - Right shoulder x-ray.  - May continue weightbearing as tolerated in the right upper extremity.  - PT and OT evaluation and treatment as indicated.    Adenocarcinoma of lung, stage 4 (HCC)  Assessment & Plan  - Patient with stage IV lung cancer, present on presentation.  - May continue treatment as indicated.  - Internal medicine admission for medical management.    Paroxysmal atrial fibrillation (HCC)  Assessment & Plan  - Patient with chronic history of atrial fibrillation with appropriate heart rate control on presentation.  - May continue home medication regimen including antiplatelet and anticoagulant medications from a trauma service standpoint.  - Internal medicine admission for medical management.    * ADITYA on CKD  Assessment & Plan  Lab Results   Component Value Date    EGFR 32 05/29/2024    EGFR 32 05/28/2024    EGFR 46 05/08/2024     "CREATININE 1.57 (H) 05/29/2024    CREATININE 1.57 (H) 05/28/2024    CREATININE 1.15 05/08/2024     - Patient with apparent mild ADITYA, present on presentation, likely secondary to poor oral intake.  - Avoid nephrotoxic medications and hypotension.  - Internal medicine admission for medical management.    Chronic diastolic heart failure (HCC)  Assessment & Plan  Wt Readings from Last 3 Encounters:   05/21/24 69.3 kg (152 lb 12.8 oz)   05/20/24 71.4 kg (157 lb 8 oz)   05/10/24 71.7 kg (158 lb)       - Patient with chronic diastolic heart failure, present on presentation.  - Patient does have bilateral lower extremity edema, but no respiratory symptoms or shortness of breath.  - Internal medicine admission for medical management.        Type 2 diabetes mellitus with stage 3 chronic kidney disease, without long-term current use of insulin, unspecified whether stage 3a or 3b CKD ([facility])  Assessment & Plan  Lab Results   Component Value Date    HGBA1C 7.2 (H) 05/08/2024       Recent Labs     05/28/24  2109 05/29/24  0738 05/29/24  1241   POCGLU 148* 156* 147*         Blood Sugar Average: Last 72 hrs:  (P) [id number]    - Patient with type 2 diabetes mellitus with history of CKD.  - Internal medicine admission for medical management.             Trauma Alert: Level B   Model of Arrival: Ambulance    Trauma Team: Attending Dr. Vergara, Fellow Dr. Coleman, and AP Cecil Mak PA-C  Consultants: SLIM, routine consult for admission     History of Present Illness     Chief Complaint: \"I collapsed.\"  Mechanism:Fall     HPI:    Melany Griffith is a 73 y.o. female who presents with some mild right upper back/scapular pain.  The patient notes that she has been weak lately and today she collapsed after feeling weak and her legs gave out.  She denied syncope or losing consciousness.  She denied any preceding symptoms such as lightheadedness, headaches, visual changes, dizziness, chest pain, shortness of breath, difficulty " breathing or palpitations.  She notes she simply just collapsed out of weakness.  She does have a history of stage IV lung cancer and is undergoing treatment.  She has had poor oral intake recently and has had multiple collapses due to weakness.  She has some new right upper back/scapular pain today in setting of chronic back pain as well.    Review of Systems   Constitutional:  Positive for activity change (Decreased activity with multiple falls due to weakness and collapse recently) and fatigue. Negative for appetite change, chills, diaphoresis and fever.   HENT: Negative.  Negative for ear pain and facial swelling.    Eyes: Negative.  Negative for photophobia, pain and visual disturbance.   Respiratory: Negative.  Negative for cough, chest tightness, shortness of breath and wheezing.    Cardiovascular: Negative.  Negative for chest pain, palpitations and leg swelling.   Gastrointestinal: Negative.  Negative for abdominal distention, abdominal pain, nausea and vomiting.   Endocrine: Negative.    Genitourinary: Negative.  Negative for difficulty urinating, dysuria, flank pain, frequency and hematuria.   Musculoskeletal:  Positive for back pain (Right upper back/scapular pain). Negative for arthralgias, myalgias and neck pain.   Skin: Negative.  Negative for color change, pallor, rash and wound.   Allergic/Immunologic: Negative.    Neurological:  Positive for weakness. Negative for dizziness, syncope, light-headedness, numbness and headaches.   Hematological: Negative.    Psychiatric/Behavioral: Negative.  Negative for agitation, confusion and self-injury. The patient is not nervous/anxious.      12-point, complete review of systems was reviewed and negative except as stated above.     Historical Information     Past Medical History:   Diagnosis Date    A-fib (HCC)     Arthritis     Atrial fibrillation (HCC)     Confusion     Diabetes mellitus (HCC)     Diabetes mellitus type 2, uncomplicated (HCC)     Last  assessed: 17    Encephalopathy 2022    Essential hypertension     Frozen shoulder     L shoulder    GERD (gastroesophageal reflux disease)     Hx of cancer of uterus     Last assessed: 8/21/15    Hyperlipidemia     Hypertension     Hyponatremia 2016    Lung mass     diagnosed 2016    Malignant neoplasm without specification of site (HCC)     Skin cancer, basal cell     right eye area    Stage 4 lung cancer (HCC)     Thrombocytopenia, unspecified (HCC) 2023     Past Surgical History:   Procedure Laterality Date    APPENDECTOMY      BRONCHOSCOPY N/A 2016    Procedure: BRONCHOSCOPY FLEXIBLE;  Surgeon: Giles Alonso MD;  Location: [facility];  Service:     CHOLECYSTECTOMY      COLONOSCOPY      COLONOSCOPY      FL GUIDED CENTRAL VENOUS ACCESS DEVICE INSERTION  2021    GALLBLADDER SURGERY      HYSTERECTOMY      Total abdominal    JOINT REPLACEMENT      LARYNGOSCOPY      Flexible Fiberoptic, (Therapeutic), Resolved: 16    MOHS SURGERY      Micrographic Surgery Face    NY BRNCHSC INCL FLUOR GDNCE DX W/CELL WASHG SPX N/A 2017    Procedure: BRONCHOSCOPY FLEXIBLE;  Surgeon: Denzel Manning MD;  Location: [facility];  Service: Pulmonary    NY BRONCHOSCOPY NEEDLE BX TRACHEA MAIN STEM&/BRON N/A 2016    Procedure: EBUS; FROZEN SECTION ;  Surgeon: Giles Alonso MD;  Location: [facility];  Service: Thoracic    NY INSJ TUNNELED CTR VAD W/SUBQ PORT AGE 5 YR/> N/A 2021    Procedure: INSERTION VENOUS PORT ( PORT-A-CATH) IR;  Surgeon: Hansel Garduno DO;  [facility];  Service: Interventional Radiology    TONSILECTOMY AND ADNOIDECTOMY      TONSILLECTOMY      TOTAL KNEE ARTHROPLASTY Right 2014        Social History     Tobacco Use    Smoking status: Former     Current packs/day: 0.00     Average packs/day: 1 pack/day for 50.0 years (50.0 ttl pk-yrs)     Types: Cigarettes     Start date:      Quit date: 2000     Years since quittin.4     Passive  exposure: Past    Smokeless tobacco: Never    Tobacco comments:     Quit in 2000   Vaping Use    Vaping status: Never Used   Substance Use Topics    Alcohol use: No    Drug use: No     Immunization History   Administered Date(s) Administered    COVID-19 PFIZER VACCINE 0.3 ML IM 02/20/2021, 03/12/2021, 11/22/2021    COVID-19 Pfizer Vac BIVALENT Haile-sucrose 12 Yr+ IM 09/15/2022    COVID-19 Pfizer vac (Haile-sucrose, gray cap) 12 yr+ IM 04/23/2022    H1N1, All Formulations 11/05/2009    INFLUENZA 10/27/2014, 09/21/2015, 10/19/2016, 10/02/2017, 10/10/2018, 09/26/2019, 08/26/2020, 12/09/2021, 10/17/2022    Influenza, high dose seasonal 0.7 mL 11/14/2023    Influenza, seasonal, injectable 10/27/2014, 10/19/2016, 10/02/2017    Pneumococcal Conjugate 13-Valent 12/03/2015    Pneumococcal Conjugate Vaccine 20-valent (Pcv20), Polysace 11/15/2022    Pneumococcal Polysaccharide PPV23 09/26/2014    Tdap 10/06/2018    Zoster 10/03/2013    influenza, trivalent, adjuvanted 08/26/2019     Last Tetanus: Up-to-date  Family History: Non-contributory    1. Before the illness or injury that brought you to the Emergency, did you need someone to help you on a regular basis? 1=Yes   2. Since the illness or injury that brought you to the Emergency, have you needed more help than usual to take care of yourself? 1=Yes   3. Have you been hospitalized for one or more nights during the past 6 months (excluding a stay in the Emergency Department)? 0=No   4. In general, do you see well? 0=Yes   5. In general, do you have serious problems with your memory? 0=No   6. Do you take more than three different medications everyday? 1=Yes   TOTAL   3     Did you order a geriatric consult if the score was 2 or greater?:  Patient to be admitted to internal medicine; defer geriatric medicine consultation decision to primary service.     Meds/Allergies   all current active meds have been reviewed     Allergies   Allergen Reactions    Amoxicillin Hives     Amoxicillin Rash and Hives    Cardizem [Diltiazem] Rash     Rash      Statins Myalgia     Severe muscle aching  Terrible pains    Zofran [Ondansetron] Palpitations       Objective   Initial Vitals:   Temperature: 97.9 °F (36.6 °C) (05/28/24 1556)  Pulse: 79 (05/28/24 1550)  Respirations: 18 (05/28/24 1550)  Blood Pressure: 119/70 (05/28/24 1550)    Primary Survey:   Airway:        Status: patent;        Pre-hospital Interventions: none        Hospital Interventions: none  Breathing:        Pre-hospital Interventions: none       Effort: normal       Right breath sounds: normal       Left breath sounds: normal  Circulation:        Rhythm: regular       Rate: regular   Right Pulses Left Pulses    R radial: 2+  R femoral: 2+  R pedal: 2+  R carotid: 2+  R popliteal: 2+ L radial: 2+  L femoral: 2+  L pedal: 2+  L carotid: 2+  L popliteal: 2+   Disability:        GCS: Eye: 4; Verbal: 5 Motor: 6 Total: 15       Right Pupil: round;  reactive         Left Pupil:  round;  reactive      R Motor Strength L Motor Strength    R : 5/5  R dorsiflex: 5/5  R plantarflex: 5/5 L : 5/5  L dorsiflex: 5/5  L plantarflex: 5/5        Sensory:  No sensory deficit  Exposure:       Completed: Yes      Secondary Survey:  Physical Exam  Vitals and nursing note reviewed. Exam conducted with a chaperone present.   Constitutional:       General: She is awake. She is not in acute distress.     Appearance: She is ill-appearing (Chronically ill-appearing). She is not toxic-appearing or diaphoretic.      Interventions: She is not intubated.  HENT:      Head: Normocephalic and atraumatic. No abrasion, contusion or laceration.      Jaw: There is normal jaw occlusion.      Right Ear: Hearing and external ear normal. No swelling or tenderness.      Left Ear: Hearing and external ear normal. No swelling or tenderness.      Nose: Nose normal. No nasal deformity, septal deviation, signs of injury, laceration or nasal tenderness.      Mouth/Throat:       Mouth: Mucous membranes are moist.      Pharynx: Oropharynx is clear.   Eyes:      General: Lids are normal. Vision grossly intact.      Extraocular Movements: Extraocular movements intact.      Conjunctiva/sclera: Conjunctivae normal.      Pupils: Pupils are equal, round, and reactive to light.   Neck:      Comments: Cervical spine cleared during initial trauma evaluation with no pain on range of motion, no tenderness in the midline or paraspinal musculature and no distracting injuries.  Cardiovascular:      Rate and Rhythm: Normal rate and regular rhythm.      Pulses: Normal pulses.           Carotid pulses are 2+ on the right side and 2+ on the left side.       Radial pulses are 2+ on the right side and 2+ on the left side.        Femoral pulses are 2+ on the right side and 2+ on the left side.       Dorsalis pedis pulses are 2+ on the right side and 2+ on the left side.      Heart sounds: Normal heart sounds. No murmur heard.     No friction rub. No gallop.   Pulmonary:      Effort: Pulmonary effort is normal. No tachypnea, bradypnea, accessory muscle usage, prolonged expiration, respiratory distress or retractions. She is not intubated.      Breath sounds: Normal breath sounds and air entry. No stridor or decreased air movement. No decreased breath sounds, wheezing, rhonchi or rales.   Chest:      Chest wall: No deformity, swelling, tenderness or crepitus.   Abdominal:      General: Abdomen is flat. Bowel sounds are normal. There is no distension.      Palpations: Abdomen is soft.      Tenderness: There is no abdominal tenderness. There is no guarding or rebound.   Musculoskeletal:         General: No swelling or deformity. Normal range of motion.      Cervical back: Normal range of motion and neck supple. No swelling, deformity, lacerations or tenderness. No pain with movement, spinous process tenderness or muscular tenderness. Normal range of motion.      Thoracic back: No swelling, deformity, lacerations or  tenderness.      Lumbar back: No swelling, deformity, lacerations or tenderness.        Back:       Right lower leg: Edema present.      Left lower leg: Edema present.      Comments: There is mild tenderness over the right upper back and scapular region.  No midline cervical, thoracic or lumbar spine tenderness, step-offs or deformities.  No paraspinal muscular tenderness in the neck or back.    Normal range of motion in all 4 extremities without pain, tenderness or deformity.   Skin:     General: Skin is warm and dry.      Capillary Refill: Capillary refill takes less than 2 seconds.      Coloration: Skin is not jaundiced or pale.      Findings: No abrasion, bruising, ecchymosis, erythema, laceration, lesion, rash or wound.   Neurological:      General: No focal deficit present.      Mental Status: She is alert and oriented to person, place, and time. Mental status is at baseline.      GCS: GCS eye subscore is 4. GCS verbal subscore is 5. GCS motor subscore is 6.      Sensory: Sensation is intact. No sensory deficit.      Motor: Motor function is intact. No weakness.   Psychiatric:         Mood and Affect: Mood normal.         Behavior: Behavior is cooperative.         Invasive Devices       Central Venous Catheter Line  Duration             Port A Cath 12/14/21 Right Chest 897 days              Peripheral Intravenous Line  Duration             Peripheral IV 05/29/24 Left Antecubital <1 day                  Lab Results: I have personally reviewed all pertinent laboratory/test results from 05/29/24, including the preceding 24 hours.  Recent Labs     05/28/24  1602 05/28/24  1605 05/28/24  1606 05/29/24  0442   WBC 16.83*  --   --  13.95*   HGB 9.9*   < > 10.2* 10.1*   HCT 33.9*   < > 30* 34.8   *  --   --  516*   SODIUM 130*  --   --  131*   K 4.8  --   --  4.1   CL 95*  --   --  95*   CO2 23   < > 25 24   BUN 37*  --   --  38*   CREATININE 1.57*  --   --  1.57*   GLUC 118  --   --  127   CAIONIZED  --    < >  1.13  --    AST 13  --   --   --    ALT 11  --   --   --    ALB 3.2*  --   --   --    TBILI 0.57  --   --   --    ALKPHOS 95  --   --   --     < > = values in this interval not displayed.       Imaging Results: I have personally reviewed pertinent images saved in PACS. CT scan findings (and other pertinent positive findings on images) were discussed with radiology. My interpretation of the images/reports are as follows:  Chest Xray(s): negative for acute findings   FAST exam(s): negative for acute findings   CT Scan(s): negative for acute findings   Additional Xray(s): negative for acute findings     Other Studies: N/A    Code Status: Level 3 - DNAR and DNI  Advance Directive and Living Will:      Power of : Yes  POLST: Yes

## 2024-05-28 NOTE — H&P
Formerly [facility]  H&P  Name: Melany Griffith 73 y.o. female I MRN: [id number]  Unit/Bed#: QIANA I Date of Admission: 5/28/2024   Date of Service: 5/28/2024 I Hospital Day: 0      Assessment & Plan   * ADITYA on CKD  Assessment & Plan  Lab Results   Component Value Date    EGFR 32 05/28/2024    EGFR 46 05/08/2024    EGFR 60 04/29/2024    CREATININE 1.57 (H) 05/28/2024    CREATININE 1.15 05/08/2024    CREATININE 0.93 04/29/2024       POA Cr 1.57. Baseline appears to be around 1. Consider most likely secondary to hypovolemia given that she endorses poor PO intake recently, as well as recent increase in prn lasix use for lower extremity swelling. Consider swelling most likely a medication side effect from Sotorasib.     Plan  Gentle hydration NS @ 50/hr  UA  Urine sodium  Urine creatinine   Bladder scan  Home lasix, sotalol, valsartan held for now  Promote normotension, avoid nephrotoxins.    Lower extremity edema  Assessment & Plan  Chronic but worsening recently. She does have a history of grade 1 diastolic dysfunction, but denies any recent SOB. Consider most likely secondary to Sotorasib use as this has been a reported side effect and pt does not appear to be in CHF exacerbation.     Plan  Apply compression stockings  B/L lower extremity doppler  Continue Sotorasib for now  PT/OT    Ambulatory dysfunction  Assessment & Plan  Recent increased weakness with several recent falls. She endorses significant weight loss and poor appetite over the past 2 months. Lives at home, occasionally uses cane or walker but generally does not.     Plan  PT/OT  Fall precautions  Check orthostatics  Nutrition consult      Fall  Assessment & Plan  S/p mechanical fall with no LOC. On eliquis. Sustained hit to back of the head and R shoulder. Trauma workup negative. Not currently in any pain.     Plan  Tylenol prn for pain control  PT/OT    Adenocarcinoma of lung, stage 4 (HCC)  Assessment & Plan  S/p chemo, radiation, and  "immunotherapy. Follows w/ Dr. Sam outpatient. Currently on Sotorasib only.     Plan  Will provide sotorasib from home    Moderate protein-calorie malnutrition (HCC)  Assessment & Plan  Malnutrition Findings:       She endorsed poor appetite and significant weight loss over the past 2 months, not completely sure how much. Nutrition consulted, input appreciated. Ensure ordered 10/2/HS.                           BMI Findings:           There is no height or weight on file to calculate BMI.       Sacral wound  Assessment & Plan  Small wound present in the R sacral/buttock area. Small amount of scabbing present, no current discharge. Low suspicion of infection.     Plan  Wound care consult  No need for abx at this time    Acute pain of right shoulder  Assessment & Plan  S/p fall. Trauma XR's all negative. Tylenol 975 prn.     Constipation  Assessment & Plan  She notes ongoing issues with constipation at home. Miralax and Senna both ordered daily. Increase/reduce as needed.     Type 2 diabetes mellitus with stage 3 chronic kidney disease, without long-term current use of insulin, unspecified whether stage 3a or 3b CKD ([facility])  Assessment & Plan  Lab Results   Component Value Date    HGBA1C 7.2 (H) 05/08/2024       No results for input(s): \"POCGLU\" in the last 72 hours.    Blood Sugar Average: Last 72 hrs:    Hold home medications. SSI algorithm 1, titrate as needed.     Chronic diastolic heart failure (HCC)  Assessment & Plan  Wt Readings from Last 3 Encounters:   05/21/24 69.3 kg (152 lb 12.8 oz)   05/20/24 71.4 kg (157 lb 8 oz)   05/10/24 71.7 kg (158 lb)     Last Echo in 2022 showed EF 60, grade 1 diastolic dysfunction.   Lungs clear on exam, no SOB. 3+ lower extremitiy edema on exam, consider likely medication side effect from Sotorasib.   Home Lasix recently increased to 40 mg daily prn for swelling    Plan  Gentle hydration for ADITYA  Home lasix held due to ADITYA  Continue to monitor volume status. Stop fluids if " volume overload suspected.           Hyponatremia  Assessment & Plan    Likely 2/2 poor PO intake. Check Urine sodium. Encourage PO intake and continue to monitor.     Paroxysmal atrial fibrillation (HCC)  Assessment & Plan  Continue home eliquis, metoprolol  Home sotalol held until renal function improves         VTE Pharmacologic Prophylaxis: VTE Score: 7 High Risk (Score >/= 5) - Pharmacological DVT Prophylaxis Ordered: apixaban (Eliquis). Sequential Compression Devices Ordered.  Code Status: Level 3 - DNAR and DNI   Discussion with family: Updated  (daughter) via phone.    Anticipated Length of Stay: Patient will be admitted on an observation basis with an anticipated length of stay of less than 2 midnights secondary to ADITYA, fall.    Chief Complaint: Fall    History of Present Illness:  Melany Griffith is a 73 y.o. 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 She notes that she stood up and turned to walk somewhere, lost her balance, and fell. She denies any LOC, lightheadedness, or dizziness. She also endorses decreased appetite, weight loss, and increased weakness over the past 2 months. She recently increased the amount of lasix she was taking to 40 daily prn for swelling, and has taken it daily over the past few days.     CT head and C-spine, CXR, and R shoulder XR negative on arrival. Labs notable for Cr 1.57 compared with baseline of ~1, Hgb 9.9 appears at baseline, WBC 16.8 (recent baseline 10-14).     Review of Systems:  Review of Systems   Constitutional:  Positive for appetite change and fatigue. Negative for chills and fever.        Weight loss   HENT:  Negative for trouble swallowing.    Eyes:  Negative for visual  disturbance.   Respiratory:  Negative for chest tightness and shortness of breath.    Cardiovascular:  Positive for leg swelling. Negative for chest pain.   Gastrointestinal:  Negative for abdominal pain, diarrhea and vomiting.   Genitourinary:         Urinary incontinence   Musculoskeletal:  Positive for gait problem.   Neurological:  Positive for weakness (generalized). Negative for dizziness and light-headedness.   Psychiatric/Behavioral:  Negative for agitation.         Poor memory       Past Medical and Surgical History:   Past Medical History:   Diagnosis Date    A-fib (HCC)     Arthritis     Atrial fibrillation (HCC)     Confusion     Diabetes mellitus (HCC)     Diabetes mellitus type 2, uncomplicated (HCC)     Last assessed: 8/17/17    Encephalopathy 1/6/2022    Essential hypertension     Frozen shoulder     L shoulder    GERD (gastroesophageal reflux disease)     Hx of cancer of uterus     Last assessed: 8/21/15    Hyperlipidemia     Hypertension     Hyponatremia 11/16/2016    Lung mass     diagnosed 9/2016    Malignant neoplasm without specification of site (HCC)     Skin cancer, basal cell     right eye area    Stage 4 lung cancer (HCC)     Thrombocytopenia, unspecified (HCC) 1/20/2023       Past Surgical History:   Procedure Laterality Date    APPENDECTOMY      BRONCHOSCOPY N/A 11/17/2016    Procedure: BRONCHOSCOPY FLEXIBLE;  Surgeon: Giles Alonso MD;  Location: [facility];  Service:     CHOLECYSTECTOMY      COLONOSCOPY      COLONOSCOPY      FL GUIDED CENTRAL VENOUS ACCESS DEVICE INSERTION  12/14/2021    GALLBLADDER SURGERY      HYSTERECTOMY  2000    Total abdominal    JOINT REPLACEMENT      LARYNGOSCOPY      Flexible Fiberoptic, (Therapeutic), Resolved: 11/17/16    MOHS SURGERY      Micrographic Surgery Face    [facility] G[facility] DX W/CELL WASHG SPX N/A 5/24/2017    Procedure: BRONCHOSCOPY FLEXIBLE;  Surgeon: Denzel Manning MD;  Location: [facility];  Service: Pulmonary    DE BRONCHOSCOPY  NEEDLE BX TRACHEA MAIN STEM&/BRON N/A 11/17/2016    Procedure: EBUS; FROZEN SECTION ;  Surgeon: Giles Alonso MD;  Location: [facility];  Service: Thoracic    WI INSJ TUNNELED CTR VAD W/SUBQ PORT AGE 5 YR/> N/A 12/14/2021    Procedure: INSERTION VENOUS PORT ( PORT-A-CATH) IR;  Surgeon: Hansel Garduno DO;  [facility];  Service: Interventional Radiology    TONSILECTOMY AND ADNOIDECTOMY      TONSILLECTOMY      TOTAL KNEE ARTHROPLASTY Right 09/23/2014       Meds/Allergies:  Prior to Admission medications    Medication Sig Start Date End Date Taking? Authorizing Provider   acetaminophen (TYLENOL) 500 mg tablet Take 1-2 tablets (500-1,000 mg total) by mouth 3 (three) times a day as needed for mild pain, headaches or fever 8/14/23   Giles Orr MD   apixaban (Eliquis) 5 mg TAKE 1 TABLET TWICE A DAY 2/18/24   Guerrero Galo MD   aspirin (ECOTRIN LOW STRENGTH) 81 mg EC tablet Take 1 tablet (81 mg total) by mouth daily 1/21/22   Ashley Depadua, MD   atorvastatin (LIPITOR) 40 mg tablet Take 1 tablet (40 mg total) by mouth daily 3/11/24   Salina Dwyer DO   busPIRone (BUSPAR) 7.5 mg tablet Take 1 tablet (7.5 mg total) by mouth daily at bedtime 2/21/24   Giles Orr MD   cyanocobalamin (VITAMIN B-12) 100 mcg tablet Take by mouth daily    Historical Provider, MD   diphenhydrAMINE HCl (BENADRYL ALLERGY PO)     Historical Provider, MD   folic acid (FOLVITE) 1 mg tablet Take 1 tablet (1 mg total) by mouth daily 7/19/21   Samreen Sam MD   furosemide (LASIX) 20 mg tablet Take 2 tablets (40 mg total) by mouth daily as needed (for lower leg swelling) 10/10/23   Annelise Darling PA-C   gabapentin (Neurontin) 100 mg capsule Take 2 capsules (200 mg total) by mouth 2 (two) times a day 2/21/24   Giles Orr MD   linaGLIPtin (Tradjenta) 5 MG TABS Take 5 mg by mouth daily 1/22/24   Salina Dwyer DO   loperamide (IMODIUM) 2 mg capsule Take 2 mg by mouth 4 (four) times a day as needed for  diarrhea  Patient not taking: Reported on 5/28/2024    Historical Provider, MD   loratadine (CLARITIN) 10 mg tablet Take 10 mg by mouth as needed  Patient not taking: Reported on 5/28/2024    Historical Provider, MD   magnesium (MAGTAB) 84 MG (7MEQ) TBCR Take 1 tablet (84 mg total) by mouth 2 (two) times a day for 7 days  Patient taking differently: Take 84 mg by mouth 2 (two) times a day Ran out of medication 4/29/24 5/21/24  Redd Roberts MD   metFORMIN (GLUCOPHAGE) 500 mg tablet Take 1 tablet (500 mg total) by mouth 2 (two) times a day with meals 3/11/24   Salina Dwyer DO   metoprolol tartrate (LOPRESSOR) 25 mg tablet Take 1 tablet (25 mg total) by mouth every 12 (twelve) hours 1/22/24   Guerrero Galo MD   Multiple Vitamin (MULTIVITAMIN ADULT PO)     Historical Provider, MD   Omega-3 Fatty Acids (OMEGA-3 FISH OIL PO)     Historical Provider, MD   omeprazole (PriLOSEC) 20 mg delayed release capsule Take 1 capsule (20 mg total) by mouth daily 1/22/24   Salina Dwyer DO   potassium chloride (Klor-Con M10) 10 mEq tablet Take 1 tablet (10 mEq total) by mouth daily 5/21/24   Salina Dwyer DO   prochlorperazine (COMPAZINE) 10 mg tablet Take 1 tablet (10 mg total) by mouth 3 (three) times a day as needed for nausea or vomiting 5/20/24   Giles Orr MD   sotalol (BETAPACE) 80 mg tablet Take 1 tablet (80 mg total) by mouth 2 (two) times a day 1/22/24   Guerrero Galo MD   Sotorasib 120 MG TABS Take 960 mg by mouth daily 2/14/24   Samreen Sam MD   valsartan (DIOVAN) 160 mg tablet TAKE 1 TABLET BY MOUTH TWICE A DAY 8/8/23   Salina Dwyer DO   venlafaxine (EFFEXOR) 100 MG tablet Take 1 tablet (100 mg total) by mouth daily at bedtime 5/21/24   Salina Dwyer DO     I have reviewed home medications with patient personally.    Allergies:   Allergies   Allergen Reactions    Amoxicillin Hives    Amoxicillin Rash and Hives    Cardizem [Diltiazem] Rash     Rash      Statins Myalgia     Severe muscle  aching  Terrible pains    Zofran [Ondansetron] Palpitations       Social History:  Marital Status:    Occupation:   Patient Pre-hospital Living Situation: Home  Patient Pre-hospital Level of Mobility: walks  Patient Pre-hospital Diet Restrictions: none   Substance Use History:   Social History     Substance and Sexual Activity   Alcohol Use No     Social History     Tobacco Use   Smoking Status Former    Current packs/day: 0.00    Average packs/day: 1 pack/day for 50.0 years (50.0 ttl pk-yrs)    Types: Cigarettes    Start date:     Quit date:     Years since quittin.4    Passive exposure: Past   Smokeless Tobacco Never   Tobacco Comments    Quit in      Social History     Substance and Sexual Activity   Drug Use No       Family History:  Family History   Problem Relation Age of Onset    Heart disease Father         cardiac disorder    Hypertension Father     Arthritis Father     Stroke Father         cerebrovascular accident    Skin cancer Father     Diabetes Mother     Heart disease Mother     Dementia Mother     Hypertension Mother     Thyroid disease Mother     Cancer Maternal Grandfather         of unknown origin    Cancer Family     Depression Family     Diabetes Family     Hyperlipidemia Family         essential    Heart disease Family     Hypertension Family     Stroke Family         syndrome    Lung cancer Paternal Uncle     Muscular dystrophy Brother        Physical Exam:     Vitals:   Blood Pressure: 122/67 (24)  Pulse: 77 (24)  Temperature: 97.9 °F (36.6 °C) (24 1556)  Temp Source: Oral (24 1556)  Respirations: 16 (24)  SpO2: 99 % (24)    Physical Exam  Constitutional:       General: She is not in acute distress.     Appearance: She is ill-appearing (chronic). She is not toxic-appearing.   HENT:      Mouth/Throat:      Pharynx: Oropharynx is clear.   Eyes:      Extraocular Movements: Extraocular movements intact.       Conjunctiva/sclera: Conjunctivae normal.   Cardiovascular:      Rate and Rhythm: Normal rate and regular rhythm.      Heart sounds: Normal heart sounds.   Pulmonary:      Effort: Pulmonary effort is normal.      Breath sounds: No wheezing, rhonchi or rales.   Abdominal:      Palpations: Abdomen is soft.      Tenderness: There is no abdominal tenderness. There is no guarding.   Musculoskeletal:      Right lower leg: Edema (3+) present.      Left lower leg: Edema (3+) present.   Skin:     General: Skin is warm and dry.   Neurological:      General: No focal deficit present.      Mental Status: She is alert and oriented to person, place, and time.      Motor: Weakness (generalized) present.   Psychiatric:         Mood and Affect: Mood normal.         Behavior: Behavior normal.          Additional Data:     Lab Results:  Results from last 7 days   Lab Units 05/28/24  1606 05/28/24  1605 05/28/24  1602   WBC Thousand/uL  --   --  16.83*   HEMOGLOBIN g/dL  --   --  9.9*   I STAT HEMOGLOBIN g/dl 10.2*   < >  --    HEMATOCRIT %  --   --  33.9*   HEMATOCRIT, ISTAT % 30*   < >  --    PLATELETS Thousands/uL  --   --  456*   SEGS PCT %  --   --  89*   LYMPHO PCT %  --   --  5*   MONO PCT %  --   --  5   EOS PCT %  --   --  0    < > = values in this interval not displayed.     Results from last 7 days   Lab Units 05/28/24  1606 05/28/24  1605 05/28/24  1602   SODIUM mmol/L  --   --  130*   POTASSIUM mmol/L  --   --  4.8   CHLORIDE mmol/L  --   --  95*   CO2 mmol/L  --   --  23   CO2, I-STAT mmol/L 25   < >  --    BUN mg/dL  --   --  37*   CREATININE mg/dL  --   --  1.57*   ANION GAP mmol/L  --   --  12   CALCIUM mg/dL  --   --  9.2   ALBUMIN g/dL  --   --  3.2*   TOTAL BILIRUBIN mg/dL  --   --  0.57   ALK PHOS U/L  --   --  95   ALT U/L  --   --  11   AST U/L  --   --  13   GLUCOSE RANDOM mg/dL  --   --  118    < > = values in this interval not displayed.                       Lines/Drains:  Invasive Devices       Central Venous  Catheter Line  Duration             Port A Cath 12/14/21 Right Chest 896 days              Peripheral Intravenous Line  Duration             Peripheral IV 05/28/24 Right Antecubital <1 day                    Central Line:  Goal for removal:  chronic port           Imaging: Reviewed radiology reports from this admission including: chest xray, CT head, xray(s), and CT c-spine  TRAUMA - CT head wo contrast   Final Result by E. Alec Schoenberger, MD (05/28 1648)      No acute intracranial abnormality.      Findings were reported to Vivek Vergara via HIPAA compliant secure electronic messaging at 4:41 p.m.               Workstation performed: [id number]         TRAUMA - CT spine cervical wo contrast   Final Result by E. Alec Schoenberger, MD (05/28 1648)      No cervical spine fracture or traumatic malalignment.            Findings were reported to Vivek Vergara via HIPAA compliant secure electronic messaging at 4:41 p.m.         Workstation performed: [id number]         XR Trauma multiple (SLB/SLRA trauma bay ONLY)   Final Result by E. Alec Schoenberger, MD (05/28 1647)      No acute cardiopulmonary disease within limitations of supine imaging.   Stable left upper lobe scarring. Known inferior mediastinal mass is again not well seen on x-ray   No fracture dislocation of the right shoulder.            Workstation performed: [id number]         XR shoulder 2+ vw right   Final Result by E. Alec Schoenberger, MD (05/28 1647)      No acute cardiopulmonary disease within limitations of supine imaging.   Stable left upper lobe scarring. Known inferior mediastinal mass is again not well seen on x-ray   No fracture dislocation of the right shoulder.            Workstation performed: [id number]         XR chest 1 view   Final Result by E. Alec Schoenberger, MD (05/28 1647)      No acute cardiopulmonary disease within limitations of supine imaging.   Stable left upper lobe scarring. Known inferior mediastinal mass is again not well  seen on x-ray   No fracture dislocation of the right shoulder.            Workstation performed: [id number]          VAS VENOUS DUPLEX - LOWER LIMB BILATERAL    (Results Pending)       EKG and Other Studies Reviewed on Admission:   EKG: No EKG obtained.    ** Please Note: This note has been constructed using a voice recognition system. **

## 2024-05-28 NOTE — ASSESSMENT & PLAN NOTE
Lab Results   Component Value Date    EGFR 32 05/28/2024    EGFR 46 05/08/2024    EGFR 60 04/29/2024    CREATININE 1.57 (H) 05/28/2024    CREATININE 1.15 05/08/2024    CREATININE 0.93 04/29/2024       POA Cr 1.57. Baseline appears to be around 1. Consider most likely secondary to hypovolemia given that she endorses poor PO intake recently, as well as recent increase in prn lasix use for lower extremity swelling. Consider swelling most likely a medication side effect from Sotorasib.     Plan  Gentle hydration NS @ 50/hr  UA  Urine sodium  Urine creatinine   Bladder scan  Home lasix, sotalol, valsartan held for now  Promote normotension, avoid nephrotoxins.

## 2024-05-28 NOTE — ASSESSMENT & PLAN NOTE
S/p mechanical fall with no LOC. On eliquis. Sustained hit to back of the head and R shoulder. Trauma workup negative. Not currently in any pain.     Plan  Tylenol prn for pain control  PT/OT

## 2024-05-28 NOTE — ASSESSMENT & PLAN NOTE
S/p chemo, radiation, and immunotherapy. Follows w/ Dr. Sam outpatient. Currently on Sotorasib only.     Plan  Will provide sotorasib from home

## 2024-05-28 NOTE — CASE MANAGEMENT
"   Case Management ED Progress Note    Patient name Melany Griffith  Location TR/ MRN [id number]  : 1950 Date 2024        OBJECTIVE:    Chief Complaint:   Patient Class: Emergency  Preferred Pharmacy:   [id number] - [place] - [address]  [address] [facility] [address]  Phone: [phone] Fax: [phone]    [facility] - [place], [place] - [address]  [address] [address]  Phone: [phone] Fax: [phone]    Primary Care Provider: Salina Dwyer DO    Primary Insurance: MEDICARE  Secondary Insurance: [facility]    ED Progress Note:  CM responded to trauma alert. Patient was transported into ED by [facility]-- brought to trauma bay for eval. Patient responsive to medial team.     CM located patients emergency contact from facesheet- Patel marcus ([phone]). Call made to brother, who is aware of patients locations and situation. Brother stating concerns with patient, stating she is not \"getting better\" and is hardly eating or drinking. Trauma AP aware of above.     No current identified CM needs. CM will follow and update screening, assessment, and discharge planning as appropriate.     "

## 2024-05-28 NOTE — ASSESSMENT & PLAN NOTE
She notes ongoing issues with constipation at home. Miralax and Senna both ordered daily. Increase/reduce as needed.

## 2024-05-28 NOTE — PROCEDURES
POC FAST US    Date/Time: 5/28/2024 4:08 PM    Performed by: Cecil Mak PA-C  Authorized by: Cecil Mak PA-C    Patient location:  Trauma  Procedure details:     Exam Type:  Diagnostic    Indications: blunt abdominal trauma and blunt chest trauma      Assess for:  Intra-abdominal fluid, pericardial effusion and pneumothorax    Technique: extended FAST      Views obtained:  Heart - Pericardial sac, LUQ - Splenorenal space, Suprapubic - Pouch of Erasto, RUQ - Farmer's Pouch, Left thorax and Right thorax    Image quality: diagnostic      Image availability:  Images available in PACS and video obtained  FAST Findings:     RUQ (Hepatorenal) free fluid: absent      LUQ (Splenorenal) free fluid: absent      Suprapubic free fluid: absent      Cardiac wall motion: identified      Pericardial effusion: absent    extended FAST (Pulmonary) findings:     Left lung sliding: Present      Right lung sliding: Present    Interpretation:     Impressions: negative

## 2024-05-28 NOTE — ASSESSMENT & PLAN NOTE
Chronic but worsening recently. She does have a history of grade 1 diastolic dysfunction, but denies any recent SOB. Consider most likely secondary to Sotorasib use as this has been a reported side effect and pt does not appear to be in CHF exacerbation.     Plan  Apply compression stockings  B/L lower extremity doppler  Continue Sotorasib for now  PT/OT

## 2024-05-29 ENCOUNTER — HOSPITAL ENCOUNTER (OUTPATIENT)
Dept: VASCULAR ULTRASOUND | Facility: HOSPITAL | Age: 74
Setting detail: OBSERVATION
DRG: 682 | End: 2024-05-29
Payer: MEDICARE

## 2024-05-29 ENCOUNTER — APPOINTMENT (OUTPATIENT)
Dept: VASCULAR ULTRASOUND | Facility: HOSPITAL | Age: 74
DRG: 682 | End: 2024-05-29
Payer: MEDICARE

## 2024-05-29 PROBLEM — E43 SEVERE PROTEIN-CALORIE MALNUTRITION (HCC): Status: ACTIVE | Noted: 2024-05-29

## 2024-05-29 LAB
ANION GAP SERPL CALCULATED.3IONS-SCNC: 12 MMOL/L (ref 4–13)
BASOPHILS # BLD AUTO: 0.04 THOUSANDS/ÂΜL (ref 0–0.1)
BASOPHILS NFR BLD AUTO: 0 % (ref 0–1)
BUN SERPL-MCNC: 38 MG/DL (ref 5–25)
CALCIUM SERPL-MCNC: 9.3 MG/DL (ref 8.4–10.2)
CHLORIDE SERPL-SCNC: 95 MMOL/L (ref 96–108)
CO2 SERPL-SCNC: 24 MMOL/L (ref 21–32)
CREAT SERPL-MCNC: 1.57 MG/DL (ref 0.6–1.3)
EOSINOPHIL # BLD AUTO: 0.06 THOUSAND/ÂΜL (ref 0–0.61)
EOSINOPHIL NFR BLD AUTO: 0 % (ref 0–6)
ERYTHROCYTE [DISTWIDTH] IN BLOOD BY AUTOMATED COUNT: 18.3 % (ref 11.6–15.1)
GFR SERPL CREATININE-BSD FRML MDRD: 32 ML/MIN/1.73SQ M
GLUCOSE P FAST SERPL-MCNC: 127 MG/DL (ref 65–99)
GLUCOSE SERPL-MCNC: 127 MG/DL (ref 65–140)
GLUCOSE SERPL-MCNC: 131 MG/DL (ref 65–140)
GLUCOSE SERPL-MCNC: 147 MG/DL (ref 65–140)
GLUCOSE SERPL-MCNC: 156 MG/DL (ref 65–140)
GLUCOSE SERPL-MCNC: 205 MG/DL (ref 65–140)
HCT VFR BLD AUTO: 34.8 % (ref 34.8–46.1)
HGB BLD-MCNC: 10.1 G/DL (ref 11.5–15.4)
IMM GRANULOCYTES # BLD AUTO: 0.16 THOUSAND/UL (ref 0–0.2)
IMM GRANULOCYTES NFR BLD AUTO: 1 % (ref 0–2)
LYMPHOCYTES # BLD AUTO: 0.87 THOUSANDS/ÂΜL (ref 0.6–4.47)
LYMPHOCYTES NFR BLD AUTO: 6 % (ref 14–44)
MCH RBC QN AUTO: 22.1 PG (ref 26.8–34.3)
MCHC RBC AUTO-ENTMCNC: 29 G/DL (ref 31.4–37.4)
MCV RBC AUTO: 76 FL (ref 82–98)
MONOCYTES # BLD AUTO: 0.65 THOUSAND/ÂΜL (ref 0.17–1.22)
MONOCYTES NFR BLD AUTO: 5 % (ref 4–12)
NEUTROPHILS # BLD AUTO: 12.17 THOUSANDS/ÂΜL (ref 1.85–7.62)
NEUTS SEG NFR BLD AUTO: 88 % (ref 43–75)
NRBC BLD AUTO-RTO: 0 /100 WBCS
PLATELET # BLD AUTO: 516 THOUSANDS/UL (ref 149–390)
PMV BLD AUTO: 10.4 FL (ref 8.9–12.7)
POTASSIUM SERPL-SCNC: 4.1 MMOL/L (ref 3.5–5.3)
RBC # BLD AUTO: 4.58 MILLION/UL (ref 3.81–5.12)
SODIUM SERPL-SCNC: 131 MMOL/L (ref 135–147)
WBC # BLD AUTO: 13.95 THOUSAND/UL (ref 4.31–10.16)

## 2024-05-29 PROCEDURE — 82948 REAGENT STRIP/BLOOD GLUCOSE: CPT

## 2024-05-29 PROCEDURE — 93970 EXTREMITY STUDY: CPT | Performed by: SURGERY

## 2024-05-29 PROCEDURE — 80048 BASIC METABOLIC PNL TOTAL CA: CPT

## 2024-05-29 PROCEDURE — 97163 PT EVAL HIGH COMPLEX 45 MIN: CPT

## 2024-05-29 PROCEDURE — 93970 EXTREMITY STUDY: CPT

## 2024-05-29 PROCEDURE — 97116 GAIT TRAINING THERAPY: CPT

## 2024-05-29 PROCEDURE — 99232 SBSQ HOSP IP/OBS MODERATE 35: CPT | Performed by: PHYSICIAN ASSISTANT

## 2024-05-29 PROCEDURE — 99232 SBSQ HOSP IP/OBS MODERATE 35: CPT | Performed by: INTERNAL MEDICINE

## 2024-05-29 PROCEDURE — 97167 OT EVAL HIGH COMPLEX 60 MIN: CPT

## 2024-05-29 PROCEDURE — 85025 COMPLETE CBC W/AUTO DIFF WBC: CPT

## 2024-05-29 RX ORDER — SODIUM CHLORIDE 9 MG/ML
100 INJECTION, SOLUTION INTRAVENOUS CONTINUOUS
Status: DISPENSED | OUTPATIENT
Start: 2024-05-29 | End: 2024-05-29

## 2024-05-29 RX ORDER — SODIUM CHLORIDE 9 MG/ML
100 INJECTION, SOLUTION INTRAVENOUS CONTINUOUS
Status: DISCONTINUED | OUTPATIENT
Start: 2024-05-29 | End: 2024-05-29

## 2024-05-29 RX ORDER — LANOLIN ALCOHOL/MO/W.PET/CERES
3 CREAM (GRAM) TOPICAL
Status: DISCONTINUED | OUTPATIENT
Start: 2024-05-29 | End: 2024-06-08 | Stop reason: HOSPADM

## 2024-05-29 RX ORDER — SODIUM CHLORIDE 9 MG/ML
75 INJECTION, SOLUTION INTRAVENOUS CONTINUOUS
Status: DISPENSED | OUTPATIENT
Start: 2024-05-29 | End: 2024-05-30

## 2024-05-29 RX ADMIN — VENLAFAXINE 100 MG: 37.5 TABLET ORAL at 21:36

## 2024-05-29 RX ADMIN — ATORVASTATIN CALCIUM 40 MG: 40 TABLET, FILM COATED ORAL at 09:23

## 2024-05-29 RX ADMIN — SENNOSIDES 8.6 MG: 8.6 TABLET, FILM COATED ORAL at 09:23

## 2024-05-29 RX ADMIN — POLYETHYLENE GLYCOL 3350 17 G: 17 POWDER, FOR SOLUTION ORAL at 09:24

## 2024-05-29 RX ADMIN — INSULIN LISPRO 1 UNITS: 100 INJECTION, SOLUTION INTRAVENOUS; SUBCUTANEOUS at 21:39

## 2024-05-29 RX ADMIN — VITAM B12 100 MCG: 100 TAB at 09:23

## 2024-05-29 RX ADMIN — METOPROLOL TARTRATE 25 MG: 25 TABLET, FILM COATED ORAL at 20:23

## 2024-05-29 RX ADMIN — GABAPENTIN 200 MG: 100 CAPSULE ORAL at 17:06

## 2024-05-29 RX ADMIN — GABAPENTIN 200 MG: 100 CAPSULE ORAL at 09:23

## 2024-05-29 RX ADMIN — FOLIC ACID 1 MG: 1 TABLET ORAL at 09:23

## 2024-05-29 RX ADMIN — ASPIRIN 81 MG: 81 TABLET, COATED ORAL at 09:23

## 2024-05-29 RX ADMIN — DIPHENHYDRAMINE HYDROCHLORIDE 25 MG: 25 TABLET ORAL at 21:59

## 2024-05-29 RX ADMIN — BUSPIRONE HYDROCHLORIDE 7.5 MG: 5 TABLET ORAL at 21:35

## 2024-05-29 RX ADMIN — ACETAMINOPHEN 975 MG: 325 TABLET, FILM COATED ORAL at 21:59

## 2024-05-29 RX ADMIN — METOPROLOL TARTRATE 25 MG: 25 TABLET, FILM COATED ORAL at 07:51

## 2024-05-29 RX ADMIN — Medication 3 MG: at 21:35

## 2024-05-29 RX ADMIN — APIXABAN 5 MG: 5 TABLET, FILM COATED ORAL at 09:23

## 2024-05-29 RX ADMIN — PANTOPRAZOLE SODIUM 40 MG: 40 TABLET, DELAYED RELEASE ORAL at 04:45

## 2024-05-29 RX ADMIN — APIXABAN 5 MG: 5 TABLET, FILM COATED ORAL at 17:06

## 2024-05-29 RX ADMIN — SODIUM CHLORIDE 75 ML/HR: 0.9 INJECTION, SOLUTION INTRAVENOUS at 23:02

## 2024-05-29 RX ADMIN — INSULIN LISPRO 1 UNITS: 100 INJECTION, SOLUTION INTRAVENOUS; SUBCUTANEOUS at 07:51

## 2024-05-29 NOTE — ASSESSMENT & PLAN NOTE
S/p mechanical fall with no LOC. On eliquis. Sustained hit to back of the head and R shoulder. Trauma workup negative. Not currently in any pain.     Plan  Tylenol prn for pain control  PT/OT  Fall precautions

## 2024-05-29 NOTE — ASSESSMENT & PLAN NOTE
- Acute on chronic ambulatory dysfunction secondary to worsening weakness.  - Fall precautions.  - PT and OT evaluation and treatment as indicated.

## 2024-05-29 NOTE — ASSESSMENT & PLAN NOTE
- Acute pain in the right shoulder/scapula without evidence of acute traumatic injury on exam or imaging workup.  - Analgesia as needed.  - Right shoulder x-ray.  - May continue weightbearing as tolerated in the right upper extremity.  - PT and OT evaluation and treatment as indicated.

## 2024-05-29 NOTE — ASSESSMENT & PLAN NOTE
Lab Results   Component Value Date    HGBA1C 7.2 (H) 05/08/2024       Recent Labs     05/28/24  2109 05/29/24  0738 05/29/24  1241   POCGLU 148* 156* 147*       Blood Sugar Average: Last 72 hrs:  (P) [id number]    - Patient with type 2 diabetes mellitus with history of CKD.  - Internal medicine admission for medical management.

## 2024-05-29 NOTE — PHYSICAL THERAPY NOTE
PHYSICAL THERAPY EVALUATION NOTE          Patient Name: Melany Griffith  Today's Date: 5/29/2024        AGE:   73 y.o.  Mrn:   [id number]  ADMIT DX:  Leukocytosis [D72.829]  Weakness [R53.1]  ADITYA (acute kidney injury) (HCC) [N17.9]  Moderate protein-calorie malnutrition (HCC) [E44.0]  Generalized weakness [R53.1]    Past Medical History:  Past Medical History:   Diagnosis Date    A-fib (HCC)     Arthritis     Atrial fibrillation (HCC)     Confusion     Diabetes mellitus (HCC)     Diabetes mellitus type 2, uncomplicated (HCC)     Last assessed: 8/17/17    Encephalopathy 1/6/2022    Essential hypertension     Frozen shoulder     L shoulder    GERD (gastroesophageal reflux disease)     Hx of cancer of uterus     Last assessed: 8/21/15    Hyperlipidemia     Hypertension     Hyponatremia 11/16/2016    Lung mass     diagnosed 9/2016    Malignant neoplasm without specification of site (HCC)     Skin cancer, basal cell     right eye area    Stage 4 lung cancer (HCC)     Thrombocytopenia, unspecified (HCC) 1/20/2023       Past Surgical History:  Past Surgical History:   Procedure Laterality Date    APPENDECTOMY      BRONCHOSCOPY N/A 11/17/2016    Procedure: BRONCHOSCOPY FLEXIBLE;  Surgeon: Giles Alonso MD;  Location: [facility];  Service:     CHOLECYSTECTOMY      COLONOSCOPY      COLONOSCOPY      FL GUIDED CENTRAL VENOUS ACCESS DEVICE INSERTION  12/14/2021    GALLBLADDER SURGERY      HYSTERECTOMY  2000    Total abdominal    JOINT REPLACEMENT      LARYNGOSCOPY      Flexible Fiberoptic, (Therapeutic), Resolved: 11/17/16    MOHS SURGERY      Micrographic Surgery Face    [facility] INCL FLUOR GDNCE DX W/CELL WASHG SPX N/A 5/24/2017    Procedure: BRONCHOSCOPY FLEXIBLE;  Surgeon: Denzel Manning MD;  Location: [facility];  Service: Pulmonary    OH BRONCHOSCOPY NEEDLE BX TRACHEA MAIN STEM&/BRON N/A 11/17/2016    Procedure: EBUS; FROZEN SECTION ;  Surgeon: Giles Alonso MD;   Location: [facility];  Service: Thoracic    VA INSJ TUNNELED CTR VAD W/SUBQ PORT AGE 5 YR/> N/A 2021    Procedure: INSERTION VENOUS PORT ( PORT-A-CATH) IR;  Surgeon: Hansel Garduno DO;  [facility];  Service: Interventional Radiology    TONSILECTOMY AND ADNOIDECTOMY      TONSILLECTOMY      TOTAL KNEE ARTHROPLASTY Right 2014     Length Of Stay: 0        PHYSICAL THERAPY EVALUATION:    Patient's identity confirmed via 2 patient identifiers (full name and ) at start of session       24 1154   PT Last Visit   PT Visit Date 24   Note Type   Note type Evaluation   Pain Assessment   Pain Assessment Tool 0-10   Pain Score No Pain   Restrictions/Precautions   Weight Bearing Precautions Per Order No   Other Precautions Cognitive;Chair Alarm;Bed Alarm;Fall Risk   Home Living   Type of Home House   Home Layout Two level;Elevator  (0 MARY JANE)   Bathroom Shower/Tub Walk-in shower   Bathroom Toilet Standard   Bathroom Equipment Grab bars in shower;Shower chair;Grab bars around toilet   Bathroom Accessibility Accessible via walker   Home Equipment Walker;Cane  (RW)   Additional Comments Pt's house is fully ADA accessible   Prior Function   Level of [place] Independent with functional mobility;Independent with ADLs;Needs assistance with IADLS  (pt ocassionally requires assists w/ dressing)   Lives With Family  (brother, pt is is primary caregiver)   Receives Help From Family  (cousin comes over 3hrs/day, 7days/wk; pt's brother has home health aide 1-2hrs/day, 7 days/wk)   IADLs Family/Friend/Other provides medication management;Independent with meal prep;Independent with driving   Falls in the last 6 months 1 to 4  (3 per pt)   Comments Pt inconsistent w/ her baseline function and questionable historian. Reports she ambulates independently w/ RW vs SPC vs no AD w/in her house. States she assists her brother w/ transferring, mobility, and his ADLs   General   Family/Caregiver Present No    Cognition   Overall Cognitive Status Impaired   Arousal/Participation Cooperative   Orientation Level Oriented X4  (generally oriented to month/year)   Memory Decreased short term memory;Decreased recall of precautions;Decreased recall of recent events   Following Commands Follows one step commands with increased time or repetition   Comments Pt ID via name and ; pt agreeable to PT eval and mobility. Pt pleasant throughout, appears to have questionable insight in to deficits   RLE Assessment   RLE Assessment X   Strength RLE   R Knee Flexion 4/5   R Knee Extension 4/5   R Ankle Dorsiflexion 4/5   R Ankle Plantar Flexion 4/5   LLE Assessment   LLE Assessment X   Strength LLE   L Knee Flexion 4/5   L Knee Extension 4/5   L Ankle Dorsiflexion 3+/5   L Ankle Plantar Flexion 3+/5   Light Touch   RLE Light Touch Grossly intact   LLE Light Touch Grossly intact   Bed Mobility   Supine to Sit 4  Minimal assistance   Additional items Assist x 1;HOB elevated;Increased time required;Verbal cues   Additional Comments able to maintain sitting balance at EOB w/ supervision, denied lightheadedness/dizziness w/ changes in position   Transfers   Sit to Stand 4  Minimal assistance   Additional items Assist x 1;Increased time required;Armrests;Verbal cues   Stand to Sit 4  Minimal assistance   Additional items Assist x 1;Armrests;Increased time required;Verbal cues   Ambulation/Elevation   Gait pattern Improper Weight shift;Decreased foot clearance;Short stride;Excessively slow;Decreased hip extension;Decreased heel strike;Decreased toe off   Gait Assistance 4  Minimal assist  (however, mod Ax1 when turning/ambulating backwards due to retropulsion)   Additional items Assist x 1;Verbal cues   Assistive Device Rolling walker   Distance 25'   Balance   Static Sitting Fair +   Dynamic Sitting Fair   Static Standing Fair -  (w/ RW)   Dynamic Standing Poor +   Ambulatory Poor +  (w/ RW)   Activity Tolerance   Activity Tolerance Patient  limited by fatigue   Medical Staff Made Aware Pt benefited from PT/OT care coordination w/ OT Nguyen due to to allow for challenge of pt's activity tolerance, PT and OT goals were addressed individually during session   Nurse Made Aware RNs Timothy Gilliland   Assessment   Prognosis Good   Problem List Decreased strength;Decreased endurance;Impaired balance;Decreased mobility;Decreased cognition;Impaired judgement;Decreased safety awareness   Assessment Melany Griffith is a 73 y.o. Female who [facility] on [facility] due to fall, weakness and diagnosis of ADITYA on CKD. Orders for PT eval and treat received. Comorbidities affecting pt's functional mobility at time of evaluation include: DM, LE edema, sacral wound, protein-calorie malnutrition, cognitive decline, lung CA. Personal factors affecting DC include: ambulating w/ assistive device, inability to ambulate household distances, inability to navigate level surfaces w/o external assistance, decreased cognition, limited home support, positive fall history, limited insight into impairments, and inability to perform IADLs. At baseline, pt mobilizes independently w/ RW vs SPC vs no AD, and w/ 3 fall(s) in the previous 6 months. Upon evaluation, pt presents w/ the following deficits: impaired strength, impaired balance, impaired cognition, decreased safety awareness, decreased endurance/activity tolerance, and gait deviations. Pt currently requires  min Ax1 for bed mobility, min Ax1 for transfers, min-mod Ax1 w/ RW for ambulation. Pt's clinical presentation is unstable/unpredictable due to abnormal lab values, need for increased assistance w/ functional mobility compared to baseline, need for input for mobility technique, need to input for safety awareness, recent h/o falls, ongoing medical management. From a PT/mobility standpoint given the above findings, DC recommendation is level: II (Moderate Rehab Resource Intensity). During current admission, pt will benefit from  continued skilled inpatient PT in the acute care setting in order to address the above deficits and to maximize function and mobility prior to DC from acute care.   Barriers to Discharge Decreased caregiver support  (pt is her brother's caregiver)   Goals   Patient Goals to get stronger   STG Expiration Date 06/08/24   Short Term Goal #1 Pt will: perform bed mobility w/ mod I to decrease pt's burden of care and increase pt's independence w/ repositioning in bed; perform transfers w/ mod I to promote OOB mobility; ambulate at least 150' w/ LRAD and mod I to increase pt's ambulatory endurance/tolerance; increase all balance ratings by at least 1 grade to decrease pt's risk of falls   PT Treatment Day 1  (PT tx note below)   Plan   Treatment/Interventions Functional transfer training;LE strengthening/ROM;Therapeutic exercise;Endurance training;Cognitive reorientation;Patient/family training;Equipment eval/education;Gait training;Bed mobility;Compensatory technique education   PT Frequency 3-5x/wk   Discharge Recommendation   Rehab Resource Intensity Level, PT II (Moderate Resource Intensity)   Equipment Recommended Walker   Walker Package Recommended Wheeled walker   Change/add to Walker Package? No   AM-PAC Basic Mobility Inpatient   Turning in Flat Bed Without Bedrails 3   Lying on Back to Sitting on Edge of Flat Bed Without Bedrails 2   Moving Bed to Chair 3   Standing Up From Chair Using Arms 3   Walk in Room 3   Climb 3-5 Stairs With Railing 1   Basic Mobility Inpatient Raw Score 15   Basic Mobility Standardized Score 36.97   [facility] Highest Level Of Mobility   -[facility] Goal 4: Move to chair/commode   -HL Achieved 7: Walk 25 feet or more   Additional Treatment Session   Start Time 1209   End Time 1219   Treatment Assessment Additional PT tx provided post eval in order to challenge pt's activity tolerance, improve pt's gait deviations, and balance. Pt performed sit<>stand transfer w/ min Ax1. Using RW, pt  demonstrated ability ambulate an additional 40' w/ min Ax1. Pt educated on progression of functional mobility in order to increase her strength, improve her balance, and progress toward pt's PLOF. Pt continues to be functioning below baseline level, and remains limited in functional mobility due to impaired cognition, impaired balance, decreased activity tolerance, and gait deviations. Pt will continue benefit from PT to promote independence w/ functional mobility and progress towards set goals. Recommend DC w/ level: II (Moderate Rehab Resource Intensity) when medically cleared.   Equipment Use RW   Additional Treatment Day 1   End of Consult   Patient Position at End of Consult Bedside chair;Bed/Chair alarm activated;All needs within reach       The patient's AM-PAC Basic Mobility Inpatient Short Form Raw Score is 15. A Raw score of less than or equal to 16 suggests the patient may benefit from discharge to post-acute rehabilitation services. Please also refer to the recommendation of the Physical Therapist for safe discharge planning.    Pt will benefit from skilled inpatient PT during this admission in order to facilitate progress towards goals and to maximize functional independence prior to DC      DC rec: level II (Moderate Rehab Resource Intensity)        Funmilayo Whalen, PT, DPT  05/29/24

## 2024-05-29 NOTE — PLAN OF CARE
Problem: PHYSICAL THERAPY ADULT  Goal: Performs mobility at highest level of function for planned discharge setting.  See evaluation for individualized goals.  Description: Treatment/Interventions: Functional transfer training, LE strengthening/ROM, Therapeutic exercise, Endurance training, Cognitive reorientation, Patient/family training, Equipment eval/education, Gait training, Bed mobility, Compensatory technique education  Equipment Recommended: Walker       See flowsheet documentation for full assessment, interventions and recommendations.  5/29/2024 1441 by Funmilayo Whalen PT  Note: Prognosis: Good  Problem List: Decreased strength, Decreased endurance, Impaired balance, Decreased mobility, Decreased cognition, Impaired judgement, Decreased safety awareness  Assessment: Melany Griffith is a 73 y.o. Female who [facility] on [facility]/28/24 due to fall, weakness and diagnosis of ADITYA on CKD. Orders for PT eval and treat received. Comorbidities affecting pt's functional mobility at time of evaluation include: DM, LE edema, sacral wound, protein-calorie malnutrition, cognitive decline, lung CA. Personal factors affecting DC include: ambulating w/ assistive device, inability to ambulate household distances, inability to navigate level surfaces w/o external assistance, decreased cognition, limited home support, positive fall history, limited insight into impairments, and inability to perform IADLs. At baseline, pt mobilizes independently w/ RW vs SPC vs no AD, and w/ 3 fall(s) in the previous 6 months. Upon evaluation, pt presents w/ the following deficits: impaired strength, impaired balance, impaired cognition, decreased safety awareness, decreased endurance/activity tolerance, and gait deviations. Pt currently requires  min Ax1 for bed mobility, min Ax1 for transfers, min-mod Ax1 w/ RW for ambulation. Pt's clinical presentation is unstable/unpredictable due to abnormal lab values, need for increased assistance w/  functional mobility compared to baseline, need for input for mobility technique, need to input for safety awareness, recent h/o falls, ongoing medical management. From a PT/mobility standpoint given the above findings, DC recommendation is level: II (Moderate Rehab Resource Intensity). During current admission, pt will benefit from continued skilled inpatient PT in the acute care setting in order to address the above deficits and to maximize function and mobility prior to DC from acute care.  Barriers to Discharge: Decreased caregiver support (pt is her brother's caregiver)     Rehab Resource Intensity Level, PT: II (Moderate Resource Intensity)    See flowsheet documentation for full assessment.

## 2024-05-29 NOTE — ASSESSMENT & PLAN NOTE
Malnutrition Findings:   Adult Malnutrition type: Chronic illness  Adult Degree of Malnutrition: Other severe protein calorie malnutritionShe endorsed poor appetite and significant weight loss over the past 2 months, not completely sure how much. Nutrition consulted, input appreciated. Ensure ordered 10/2/HS.   Malnutrition Characteristics: Weight loss, Inadequate energy, Muscle loss                  360 Statement: Chronic/severe malnutrition r/t decreased PO intake/condition, as evidenced by 9.5% wt loss x 3 months (5/21/24: 152#, 2/27/24: 168#),  intake meeting <75% of estimated needs for > 1 month, and severe muscle mass depletion (temporal region). Treatment: liberal PO diet + nutrition supplements    BMI Findings:           There is no height or weight on file to calculate BMI.

## 2024-05-29 NOTE — ASSESSMENT & PLAN NOTE
Wt Readings from Last 3 Encounters:   05/21/24 69.3 kg (152 lb 12.8 oz)   05/20/24 71.4 kg (157 lb 8 oz)   05/10/24 71.7 kg (158 lb)     Last Echo in 2022 showed EF 60, grade 1 diastolic dysfunction.   Lungs clear on exam, no SOB. 3+ lower extremitiy edema on exam, consider likely medication side effect from Sotorasib.   Home Lasix recently increased to 40 mg daily prn for swelling    Plan  Gentle hydration for ADITYA  Home lasix held due to DAITYA  Continue to monitor volume status. Stop fluids if volume overload suspected.

## 2024-05-29 NOTE — MALNUTRITION/BMI
This medical record reflects one or more clinical indicators suggestive of malnutrition.    Malnutrition Findings:   Adult Malnutrition type: Chronic illness  Adult Degree of Malnutrition: Other severe protein calorie malnutrition  Malnutrition Characteristics: Weight loss, Inadequate energy, Muscle loss              360 Statement: Chronic/severe malnutrition r/t decreased PO intake/condition, as evidenced by 9.5% wt loss x 3 months (5/21/24: 152#, 2/27/24: 168#),  intake meeting <75% of estimated needs for > 1 month, and severe muscle mass depletion (temporal region). Treatment: liberal PO diet + nutrition supplements         There is no height or weight on file to calculate BMI.     See Nutrition note dated 5/29/24 for additional details.  Completed nutrition assessment is viewable in the nutrition documentation.

## 2024-05-29 NOTE — ASSESSMENT & PLAN NOTE
Lab Results   Component Value Date    EGFR 32 05/29/2024    EGFR 32 05/28/2024    EGFR 46 05/08/2024    CREATININE 1.57 (H) 05/29/2024    CREATININE 1.57 (H) 05/28/2024    CREATININE 1.15 05/08/2024       POA Cr 1.57. Baseline appears to be around 1. Consider most likely secondary to hypovolemia given that she endorses poor PO intake recently, as well as recent increase in prn lasix use for lower extremity swelling. Consider swelling most likely a medication side effect from Sotorasib.     Plan  Gentle hydration NS  UA  Urine sodium  Urine creatinine   Bladder scan  Home lasix, sotalol, valsartan held for now  Promote normotension, avoid nephrotoxins.

## 2024-05-29 NOTE — ASSESSMENT & PLAN NOTE
Recent increased weakness with several recent falls. She endorses significant weight loss and poor appetite over the past 2 months. Lives at home, occasionally uses cane or walker but generally does not. Orthostatic vitals negative.    Plan  PT/OT  Fall precautions  Nutrition consult

## 2024-05-29 NOTE — ASSESSMENT & PLAN NOTE
Lab Results   Component Value Date    HGBA1C 7.2 (H) 05/08/2024       Recent Labs     05/28/24  2109 05/29/24  0738 05/29/24  1241   POCGLU 148* 156* 147*       Blood Sugar Average: Last 72 hrs:  (P) [id number]    Hold home medications. SSI algorithm 1, titrate as needed.

## 2024-05-29 NOTE — ASSESSMENT & PLAN NOTE
- Status post fall due to collapse from weakness.  Patient did not have any acute traumatic injuries identified during initial evaluation.    - Fall precautions.  - Internal Medicine consultation for admission and workup of weakness which is likely secondary to failure to thrive in setting of poor oral intake and stage IV lung cancer.   - PT and OT evaluation and treatment as indicated.  - Case Management consultation for disposition planning.

## 2024-05-29 NOTE — PROGRESS NOTES
[facility]  Progress Note  Name: Melany Griffith I  MRN: [id number]  Unit/Bed#: S -01 I Date of Admission: 5/28/2024   Date of Service: 5/29/2024 I Hospital Day: 0    Assessment & Plan   * ADITYA on CKD  Assessment & Plan  Lab Results   Component Value Date    EGFR 32 05/29/2024    EGFR 32 05/28/2024    EGFR 46 05/08/2024    CREATININE 1.57 (H) 05/29/2024    CREATININE 1.57 (H) 05/28/2024    CREATININE 1.15 05/08/2024       POA Cr 1.57. Baseline appears to be around 1. Consider most likely secondary to hypovolemia given that she endorses poor PO intake recently, as well as recent increase in prn lasix use for lower extremity swelling. Consider swelling most likely a medication side effect from Sotorasib.     Plan  Gentle hydration NS  UA  Urine sodium  Urine creatinine   Bladder scan  Home lasix, sotalol, valsartan held for now  Promote normotension, avoid nephrotoxins.    Type 2 diabetes mellitus with stage 3 chronic kidney disease, without long-term current use of insulin, unspecified whether stage 3a or 3b CKD (HCC)  Assessment & Plan  Lab Results   Component Value Date    HGBA1C 7.2 (H) 05/08/2024       Recent Labs     05/28/24  2109 05/29/24  0738 05/29/24  1241   POCGLU 148* 156* 147*       Blood Sugar Average: Last 72 hrs:  (P) 150.8444309678211802    Hold home medications. SSI algorithm 1, titrate as needed.     Moderate protein-calorie malnutrition (HCC)  Assessment & Plan  Malnutrition Findings:   Adult Malnutrition type: Chronic illness  Adult Degree of Malnutrition: Other severe protein calorie malnutritionShe endorsed poor appetite and significant weight loss over the past 2 months, not completely sure how much. Nutrition consulted, input appreciated. Ensure ordered 10/2/HS.   Malnutrition Characteristics: Weight loss, Inadequate energy, Muscle loss                  360 Statement: Chronic/severe malnutrition r/t decreased PO intake/condition, as evidenced by 9.5% wt loss x  3 months (5/21/24: 152#, 2/27/24: 168#),  intake meeting <75% of estimated needs for > 1 month, and severe muscle mass depletion (temporal region). Treatment: liberal PO diet + nutrition supplements    BMI Findings:           There is no height or weight on file to calculate BMI.       Sacral wound  Assessment & Plan  Small wound present in the R sacral/buttock area. Small amount of scabbing present, no current discharge. Low suspicion of infection.     Plan  Wound care consult  No need for abx at this time    Acute pain of right shoulder  Assessment & Plan  S/p fall. Trauma XR's all negative. Tylenol 975 prn.     Ambulatory dysfunction  Assessment & Plan  Recent increased weakness with several recent falls. She endorses significant weight loss and poor appetite over the past 2 months. Lives at home, occasionally uses cane or walker but generally does not. Orthostatic vitals negative.    Plan  PT/OT  Fall precautions  Nutrition consult      Fall  Assessment & Plan  S/p mechanical fall with no LOC. On eliquis. Sustained hit to back of the head and R shoulder. Trauma workup negative. Not currently in any pain.     Plan  Tylenol prn for pain control  PT/OT  Fall precautions    Constipation  Assessment & Plan  She notes ongoing issues with constipation at home. Miralax and Senna both ordered daily. Increase/reduce as needed.     Lower extremity edema  Assessment & Plan  Chronic but worsening recently. She does have a history of grade 1 diastolic dysfunction, but denies any recent SOB. Consider most likely secondary to Sotorasib use as this has been a reported side effect and pt does not appear to be in CHF exacerbation.     Plan  Apply compression stockings  B/L lower extremity doppler  Continue Sotorasib for now  PT/OT    Chronic diastolic heart failure (HCC)  Assessment & Plan  Wt Readings from Last 3 Encounters:   05/21/24 69.3 kg (152 lb 12.8 oz)   05/20/24 71.4 kg (157 lb 8 oz)   05/10/24 71.7 kg (158 lb)     Last  Echo in 2022 showed EF 60, grade 1 diastolic dysfunction.   Lungs clear on exam, no SOB. 3+ lower extremitiy edema on exam, consider likely medication side effect from Sotorasib.   Home Lasix recently increased to 40 mg daily prn for swelling    Plan  Gentle hydration for ADITYA  Home lasix held due to ADITYA  Continue to monitor volume status. Stop fluids if volume overload suspected.           Hyponatremia  Assessment & Plan  Recent Labs     05/28/24  1602 05/29/24  0442   SODIUM 130* 131*         Likely 2/2 poor PO intake. Check Urine sodium. Encourage PO intake and continue to monitor.     Adenocarcinoma of lung, stage 4 (HCC)  Assessment & Plan  S/p chemo, radiation, and immunotherapy. Follows w/ Dr. Sam outpatient. Currently on Sotorasib only.     Plan  Will provide sotorasib from home    Paroxysmal atrial fibrillation (HCC)  Assessment & Plan  Continue home eliquis, metoprolol  Home sotalol held until renal function improves               VTE Pharmacologic Prophylaxis:   VTE Score: 7 High Risk (Score >/= 5) - Pharmacological DVT Prophylaxis Ordered: Apixaban (Eliquis). Sequential Compression Devices Ordered.    Mechanical VTE Prophylaxis in Place: Yes    Patient Centered Rounds: I have performed bedside rounds with nursing staff today.    Discussions with Specialists or Other Care Team Provider: SLIM attending    Education and Discussions with Family / Patient: Patient declined call to .    Current Length of Stay: 0 day(s)    Current Patient Status: Inpatient     Discharge Plan / Estimated Discharge Date: Anticipate discharge in 48 hrs to discharge location to be determined pending rehab evaluations.    Code Status: Level 3 - DNAR and DNI      Subjective:   Pt pleasant and cooperative this AM. Sleepy but wakeful. Notes that she has had insomnia for the past few nights, even at home. Did not realize she could ask for benadryl to help her sleep. Admits to a poor appetite for several weeks,  "says her family will tease her about only ever eating \"a few bites.\" Her breakfast is in front of her, notably untouched. Denies SOB, dizziness, CP, palpitations.    Objective:     Vitals:   Temp (24hrs), Av.8 °F (36.6 °C), Min:97.5 °F (36.4 °C), Max:98.1 °F (36.7 °C)    Temp:  [97.5 °F (36.4 °C)-98.1 °F (36.7 °C)] 97.5 °F (36.4 °C)  HR:  [76-86] 80  Resp:  [16-18] 16  BP: (101-149)/(55-73) 112/71  SpO2:  [92 %-100 %] 100 %  There is no height or weight on file to calculate BMI.     Input and Output Summary (last 24 hours):     No intake or output data in the 24 hours ending 24 1540    Physical Exam:     Physical Exam  Constitutional:       General: She is not in acute distress.     Appearance: She is ill-appearing (chronic). She is not toxic-appearing.   HENT:      Mouth/Throat:      Pharynx: Oropharynx is clear.   Eyes:      Extraocular Movements: Extraocular movements intact.      Conjunctiva/sclera: Conjunctivae normal.   Cardiovascular:      Rate and Rhythm: Normal rate and regular rhythm.      Heart sounds: Normal heart sounds.   Pulmonary:      Effort: Pulmonary effort is normal.      Breath sounds: No wheezing, rhonchi or rales.   Abdominal:      Palpations: Abdomen is soft.      Tenderness: There is no abdominal tenderness. There is no guarding.   Musculoskeletal:      Right lower leg: Edema (3+) present.      Left lower leg: Edema (3+) present.   Skin:     General: Skin is warm and dry.   Neurological:      General: No focal deficit present.      Mental Status: She is alert and oriented to person, place, and time.      Motor: Weakness (generalized) present.   Psychiatric:         Mood and Affect: Mood normal.         Behavior: Behavior normal.          Additional Data:     Labs:  Results from last 7 days   Lab Units 24  0442   WBC Thousand/uL 13.95*   HEMOGLOBIN g/dL 10.1*   HEMATOCRIT % 34.8   PLATELETS Thousands/uL 516*   SEGS PCT % 88*   LYMPHO PCT % 6*   MONO PCT % 5   EOS PCT % 0 "     Results from last 7 days   Lab Units 05/29/24  0442 05/28/24  1605 05/28/24  1602   SODIUM mmol/L 131*  --  130*   POTASSIUM mmol/L 4.1  --  4.8   CHLORIDE mmol/L 95*  --  95*   CO2 mmol/L 24  --  23   CO2, I-STAT   --    < >  --    BUN mg/dL 38*  --  37*   CREATININE mg/dL 1.57*  --  1.57*   ANION GAP mmol/L 12  --  12   CALCIUM mg/dL 9.3  --  9.2   ALBUMIN g/dL  --   --  3.2*   TOTAL BILIRUBIN mg/dL  --   --  0.57   ALK PHOS U/L  --   --  95   ALT U/L  --   --  11   AST U/L  --   --  13   GLUCOSE RANDOM mg/dL 127  --  118    < > = values in this interval not displayed.         Results from last 7 days   Lab Units 05/29/24  1241 05/29/24  0738 05/28/24  2109   POC GLUCOSE mg/dl 147* 156* 148*               Imaging:    VAS VENOUS DUPLEX - LOWER LIMB BILATERAL   Final Result by Ryan Bradshaw DO (05/29 1436)      TRAUMA - CT head wo contrast   Final Result by E. Alec Schoenberger, MD (05/28 1648)      No acute intracranial abnormality.      Findings were reported to Vivek Vergara via HIPAA compliant secure electronic messaging at 4:41 p.m.               Workstation performed: [id number]         TRAUMA - CT spine cervical wo contrast   Final Result by E. Alec Schoenberger, MD (05/28 1648)      No cervical spine fracture or traumatic malalignment.            Findings were reported to Vivek Vergara via HIPAA compliant secure electronic messaging at 4:41 p.m.         Workstation performed: [id number]         XR Trauma multiple (SLB/SLRA trauma bay ONLY)   Final Result by E. Alec Schoenberger, MD (05/28 1647)      No acute cardiopulmonary disease within limitations of supine imaging.   Stable left upper lobe scarring. Known inferior mediastinal mass is again not well seen on x-ray   No fracture dislocation of the right shoulder.            Workstation performed: [id number]         XR shoulder 2+ vw right   Final Result by E. Alec Schoenberger, MD (05/28 1647)      No acute cardiopulmonary disease within limitations of  supine imaging.   Stable left upper lobe scarring. Known inferior mediastinal mass is again not well seen on x-ray   No fracture dislocation of the right shoulder.            Workstation performed: [id number]         XR chest 1 view   Final Result by E. Alec Schoenberger, MD (05/28 1647)      No acute cardiopulmonary disease within limitations of supine imaging.   Stable left upper lobe scarring. Known inferior mediastinal mass is again not well seen on x-ray   No fracture dislocation of the right shoulder.            Workstation performed: [id number]               Recent Cultures (last 7 days):           Lines/Drains:  Invasive Devices       Central Venous Catheter Line  Duration             Port A Cath 12/14/21 Right Chest 897 days              Peripheral Intravenous Line  Duration             Peripheral IV 05/29/24 Left Antecubital <1 day                    Telemetry:        Last 24 Hours Medication List:   Current Facility-Administered Medications   Medication Dose Route Frequency Provider Last Rate    acetaminophen  975 mg Oral Q8H PRN Rito Sheridan, DO      apixaban  5 mg Oral BID Rito Sheridan, DO      aspirin  81 mg Oral Daily Rito Sheridan, DO      atorvastatin  40 mg Oral Daily Rito Sheridan, DO      busPIRone  7.5 mg Oral HS Rito Sheridan, DO      cyanocobalamin  100 mcg Oral Daily Rito Sheridan, DO      diphenhydrAMINE  25 mg Oral HS PRN Rito Sheridan, DO      folic acid  1 mg Oral Daily Rito Sheridan, DO      gabapentin  200 mg Oral BID Rito Sheridan, DO      insulin lispro  1-5 Units Subcutaneous TID AC Rito Sheridan, DO      insulin lispro  1-5 Units Subcutaneous HS Rito Sheridan, DO      melatonin  3 mg Oral HS Marta Lazo MD      metoprolol tartrate  25 mg Oral Q12H Rito Sheridan, DO      pantoprazole  40 mg Oral Early Morning Rito Sheridan, DO      polyethylene glycol  17 g Oral Daily Rito Sheridan, DO      prochlorperazine  10 mg Oral TID PRN Rito Sheridan, DO      senna  1 tablet Oral Daily Rito Sheridan, DO      sodium chloride   100 mL/hr Intravenous Continuous Billy Man  mL/hr (05/29/24 1259)    Followed by    sodium chloride  75 mL/hr Intravenous Continuous Billy Man MD      Sotorasib  960 mg Oral Daily Rito Sheridan DO      venlafaxine  100 mg Oral HS Rito Sheridan DO          Today, Patient Was Seen By: Marta Lazo MD    ** Please Note: This note has been constructed using a voice recognition system. **

## 2024-05-29 NOTE — PLAN OF CARE
Problem: OCCUPATIONAL THERAPY ADULT  Goal: Performs self-care activities at highest level of function for planned discharge setting.  See evaluation for individualized goals.  Description: Treatment Interventions: ADL retraining, Functional transfer training, UE strengthening/ROM, Endurance training, Cognitive reorientation, Patient/family training, Equipment evaluation/education, Compensatory technique education, Continued evaluation (cog assessment)          See flowsheet documentation for full assessment, interventions and recommendations.   Note: Limitation: Decreased ADL status, Decreased UE strength, Decreased Safe judgement during ADL, Decreased cognition, Decreased endurance, Decreased self-care trans, Decreased high-level ADLs (insight/ judgement, problem solving, safety awareness.)  Prognosis: Good  Assessment: Patient is a 73 y.o. female seen for OT evaluation at [facility] following admission on 5/28/2024  s/p Stage 4 chronic kidney disease (HCC). Please see above for comprehensive list of comorbidities and significant PMHx impacting functional performance.  Upon initial evaluation, pt appears to be performing below baseline functional status.   Occupational performance is affected by the following deficits: endurance ,  decreased muscular strength , decreased dynamic balance impacting functional reach, decreased trunk control , decreased activity tolerance , impaired memory , impaired judgement and problem solving , and impaired safety awareness . Personal/Environmental factors impacting D/C include: Assistance needed for ADLs and functional mobility and decreased insight toward deficits . Supporting factors include: accessible home environment Patient would benefit from OT services within the acute care setting to maximize level of functional independence in the following areas self-care transfers, functional mobility, and ADLs.  From OT standpoint, recommendation at time of D/C would  be Level 2: moderate resource intensity .     Rehab Resource Intensity Level, OT: II (Moderate Resource Intensity)   Nguyen Riley OT

## 2024-05-29 NOTE — ASSESSMENT & PLAN NOTE
Wt Readings from Last 3 Encounters:   05/21/24 69.3 kg (152 lb 12.8 oz)   05/20/24 71.4 kg (157 lb 8 oz)   05/10/24 71.7 kg (158 lb)       - Patient with chronic diastolic heart failure, present on presentation.  - Patient does have bilateral lower extremity edema, but no respiratory symptoms or shortness of breath.  - Internal medicine admission for medical management.

## 2024-05-29 NOTE — ED NOTES
Transported patient to [place] [id number], receiving PCA's were at bedside and receiving RN (Raúl) was notified of arrival via [employer].     Thomas Cook  05/28/24 2046

## 2024-05-29 NOTE — ASSESSMENT & PLAN NOTE
Lab Results   Component Value Date    HGBA1C 7.2 (H) 05/08/2024       Recent Labs     05/28/24  2109 05/29/24  0738 05/29/24  1241   POCGLU 148* 156* 147*         Blood Sugar Average: Last 72 hrs:  (P) [id number]    - Patient with type 2 diabetes mellitus with history of CKD.  - Internal medicine admission for medical management.

## 2024-05-29 NOTE — ASSESSMENT & PLAN NOTE
- Patient with stage IV lung cancer, present on presentation.  - May continue treatment as indicated.  - Internal medicine admission for medical management.

## 2024-05-29 NOTE — ASSESSMENT & PLAN NOTE
- Patient with chronic history of atrial fibrillation with appropriate heart rate control on presentation.  - May continue home medication regimen including antiplatelet and anticoagulant medications from a trauma service standpoint.  - Internal medicine admission for medical management.

## 2024-05-29 NOTE — ASSESSMENT & PLAN NOTE
Recent Labs     05/28/24  1602 05/29/24  0442   SODIUM 130* 131*         Likely 2/2 poor PO intake. Check Urine sodium. Encourage PO intake and continue to monitor.

## 2024-05-29 NOTE — PROGRESS NOTES
[facility]  Progress Note  Name: Melany Griffith I  MRN: [id number]  Unit/Bed#: S -01 I Date of Admission: 5/28/2024   Date of Service: 5/29/2024 I Hospital Day: 0    Assessment & Plan   Fall  Assessment & Plan  - Status post fall due to collapse from weakness.  Patient did not have any acute traumatic injuries identified during initial evaluation.    - Fall precautions.  - Internal Medicine consultation for admission and workup of weakness which is likely secondary to failure to thrive in setting of poor oral intake and stage IV lung cancer.   - PT and OT evaluation and treatment as indicated.  - Case Management consultation for disposition planning.      Ambulatory dysfunction  Assessment & Plan  - Acute on chronic ambulatory dysfunction secondary to worsening weakness.  - Fall precautions.  - PT and OT evaluation and treatment as indicated.    Acute pain of right shoulder  Assessment & Plan  - Acute pain in the right shoulder/scapula without evidence of acute traumatic injury on exam or imaging workup.  - Analgesia as needed.  - Right shoulder x-ray.  - May continue weightbearing as tolerated in the right upper extremity.  - PT and OT evaluation and treatment as indicated.    Adenocarcinoma of lung, stage 4 (HCC)  Assessment & Plan  - Patient with stage IV lung cancer, present on presentation.  - May continue treatment as indicated.  - Internal medicine admission for medical management.    Paroxysmal atrial fibrillation (HCC)  Assessment & Plan  - Patient with chronic history of atrial fibrillation with appropriate heart rate control on presentation.  - May continue home medication regimen including antiplatelet and anticoagulant medications from a trauma service standpoint.  - Internal medicine admission for medical management.    * ADITYA on CKD  Assessment & Plan  Lab Results   Component Value Date    EGFR 32 05/29/2024    EGFR 32 05/28/2024    EGFR 46 05/08/2024    CREATININE  "1.57 (H) 05/29/2024    CREATININE 1.57 (H) 05/28/2024    CREATININE 1.15 05/08/2024     - Patient with apparent mild ADITYA, present on presentation, likely secondary to poor oral intake.  - Avoid nephrotoxic medications and hypotension.  - Internal medicine admission for medical management.    Chronic diastolic heart failure (HCC)  Assessment & Plan  Wt Readings from Last 3 Encounters:   05/21/24 69.3 kg (152 lb 12.8 oz)   05/20/24 71.4 kg (157 lb 8 oz)   05/10/24 71.7 kg (158 lb)       - Patient with chronic diastolic heart failure, present on presentation.  - Patient does have bilateral lower extremity edema, but no respiratory symptoms or shortness of breath.  - Internal medicine admission for medical management.        Type 2 diabetes mellitus with stage 3 chronic kidney disease, without long-term current use of insulin, unspecified whether stage 3a or 3b CKD ([facility])  Assessment & Plan  Lab Results   Component Value Date    HGBA1C 7.2 (H) 05/08/2024       Recent Labs     05/28/24  2109 05/29/24  0738 05/29/24  1241   POCGLU 148* 156* 147*       Blood Sugar Average: Last 72 hrs:  (P) [id number]    - Patient with type 2 diabetes mellitus with history of CKD.  - Internal medicine admission for medical management.             TRAUMA TERTIARY SURVEY NOTE    VTE Prophylaxis: Anticoagulated with Eliquis.    Disposition: No further workup or intervention necessary from a trauma service standpoint.  Trauma service will sign off at this time.  Disposition per primary service.    Code status:  Level 3 - DNAR and DNI    Consultants: IP CONSULT TO NUTRITION SERVICES    Subjective   Transfer from: N/A    Mechanism of Injury:Fall     Chief Complaint: \"I am feeling better than yesterday.\"    HPI/Last 24 hour events: Patient notes she is feeling better this morning.  She is already been up and out of bed.  She still has some shortness over her right scapula and upper back, but it is better than yesterday.  She offers no " new complaints or pain elsewhere.     Objective   Vitals:   Temp:  [97.8 °F (36.6 °C)-98.1 °F (36.7 °C)] 98.1 °F (36.7 °C)  HR:  [76-86] 78  Resp:  [16-18] 16  BP: (101-149)/(55-73) 101/62    I/O         05/27 0701  05/28 0700 05/28 0701  05/29 0700 05/29 0701 05/30 0700           Unmeasured Stool Occurrence  1 x              Physical Exam:   GENERAL APPEARANCE: Patient in no acute distress.  HEENT: NCAT; PERRL, EOMs intact; Mucous membranes moist  NECK / BACK: No midline cervical, thoracic or lumbar spine tenderness, step-offs or deformities.  No paraspinal muscular tenderness in the neck or back.  No tenderness over the right scapula or upper back this morning.  CV: Regular rate and rhythm; no murmur/gallops/rubs appreciated.  CHEST / LUNGS: Clear to auscultation; no wheezes/rales/rhonci.  No chest wall tenderness, crepitus or deformities.  ABD: NABS; soft; non-distended; non-tender.  : Voiding spontaneously.  EXT: +2 pulses bilaterally upper & lower extremities; 2+ pitting edema of the bilateral lower extremities with compression stockings in place. Normal range of motion in all 4 extremities without pain, tenderness or deformity.  NEURO: GCS 15; no focal neurologic deficits; neurovascularly intact.  SKIN: Warm, dry and well perfused; no rash; no jaundice.    Invasive Devices       Central Venous Catheter Line  Duration             Port A Cath 12/14/21 Right Chest 897 days              Peripheral Intravenous Line  Duration             Peripheral IV 05/29/24 Left Antecubital <1 day                       1. Before the illness or injury that brought you to the Emergency, did you need someone to help you on a regular basis? 1=Yes   2. Since the illness or injury that brought you to the Emergency, have you needed more help than usual to take care of yourself? 1=Yes   3. Have you been hospitalized for one or more nights during the past 6 months (excluding a stay in the Emergency Department)? 0=No   4. In general, do  you see well? 0=Yes   5. In general, do you have serious problems with your memory? 0=No   6. Do you take more than three different medications everyday? 1=Yes   TOTAL   3     Did you order a geriatric consult if the score was 2 or greater?:  Patient admitted to internal medicine.         Lab Results: Results: I have personally reviewed all pertinent laboratory/tests results    Imaging Results: I have personally reviewed pertinent reports.   and I have personally reviewed pertinent films in PACS  Chest Xray(s): negative for acute findings   FAST exam(s): negative for acute findings   CT Scan(s): negative for acute findings   Additional Xray(s): negative for acute findings     Other Studies: N/A     Cecil Mak PA-C  5/29/2024  06:52 AM

## 2024-05-29 NOTE — ASSESSMENT & PLAN NOTE
Recent increased weakness with several recent falls. She endorses significant weight loss and poor appetite over the past 2 months. Lives at home, occasionally uses cane or walker but generally does not.     Plan  PT/OT  Fall precautions  Check orthostatics  Nutrition consult

## 2024-05-29 NOTE — ASSESSMENT & PLAN NOTE
Lab Results   Component Value Date    EGFR 32 05/29/2024    EGFR 32 05/28/2024    EGFR 46 05/08/2024    CREATININE 1.57 (H) 05/29/2024    CREATININE 1.57 (H) 05/28/2024    CREATININE 1.15 05/08/2024     - Patient with apparent mild ADITYA, present on presentation, likely secondary to poor oral intake.  - Avoid nephrotoxic medications and hypotension.  - Internal medicine admission for medical management.

## 2024-05-29 NOTE — OCCUPATIONAL THERAPY NOTE
Occupational Therapy Evaluation     Patient Name: Melany Griffith  Today's Date: 5/29/2024  Problem List  Principal Problem:    ADITYA on CKD  Active Problems:    Paroxysmal atrial fibrillation (HCC)    Adenocarcinoma of lung, stage 4 (HCC)    Hyponatremia    Chronic diastolic heart failure (HCC)    Type 2 diabetes mellitus with stage 3 chronic kidney disease, without long-term current use of insulin, unspecified whether stage 3a or 3b CKD (HCC)    Lower extremity edema    Constipation    Fall    Ambulatory dysfunction    Acute pain of right shoulder    Sacral wound    Moderate protein-calorie malnutrition (HCC)    Past Medical History  Past Medical History:   Diagnosis Date    A-fib (HCC)     Arthritis     Atrial fibrillation (HCC)     Confusion     Diabetes mellitus (HCC)     Diabetes mellitus type 2, uncomplicated (HCC)     Last assessed: 8/17/17    Encephalopathy 1/6/2022    Essential hypertension     Frozen shoulder     L shoulder    GERD (gastroesophageal reflux disease)     Hx of cancer of uterus     Last assessed: 8/21/15    Hyperlipidemia     Hypertension     Hyponatremia 11/16/2016    Lung mass     diagnosed 9/2016    Malignant neoplasm without specification of site (HCC)     Skin cancer, basal cell     right eye area    Stage 4 lung cancer (HCC)     Thrombocytopenia, unspecified (HCC) 1/20/2023     Past Surgical History  Past Surgical History:   Procedure Laterality Date    APPENDECTOMY      BRONCHOSCOPY N/A 11/17/2016    Procedure: BRONCHOSCOPY FLEXIBLE;  Surgeon: Giles Alonso MD;  [facility]: [facility];  Service:     CHOLECYSTECTOMY      COLONOSCOPY      COLONOSCOPY      FL GUIDED CENTRAL VENOUS ACCESS DEVICE INSERTION  12/14/2021    GALLBLADDER SURGERY      HYSTERECTOMY  2000    Total abdominal    JOINT REPLACEMENT      LARYNGOSCOPY      Flexible Fiberoptic, (Therapeutic), Resolved: 11/17/16    MOHS SURGERY      Micrographic Surgery Face    AZ BRNCHSC INCL FLUOR GDNCE DX W/CELL WASHG SPX N/A 5/24/2017     Procedure: BRONCHOSCOPY FLEXIBLE;  Surgeon: Denzel Manning MD;  Location: [facility];  Service: Pulmonary    MO BRONCHOSCOPY NEEDLE BX TRACHEA MAIN STEM&/BRON N/A 11/17/2016    Procedure: EBUS; FROZEN SECTION ;  Surgeon: Giles Alonso MD;  [facility];  Service: Thoracic    MO INSJ TUNNELED CTR VAD W/SUBQ PORT AGE 5 YR/> N/A 12/14/2021    Procedure: INSERTION VENOUS PORT ( PORT-A-CATH) IR;  Surgeon: Hansel Garduno DO;  [facility];  Service: Interventional Radiology    TONSILECTOMY AND ADNOIDECTOMY      TONSILLECTOMY      TOTAL KNEE ARTHROPLASTY Right 09/23/2014 05/29/24 1155   OT Last Visit   OT Visit Date 05/29/24   Note Type   Note type Evaluation   Pain Assessment   Pain Assessment Tool 0-10   Pain Score No Pain   Restrictions/Precautions   Weight Bearing Precautions Per Order No   Other Precautions Cognitive;Chair Alarm;Bed Alarm;Fall Risk  (masimo)   Home Living   Type of Home House   Home Layout Two level;Access;Elevator  (0 MARY JANE)   Bathroom Shower/Tub Walk-in shower   Bathroom Toilet Raised   Bathroom Equipment Grab bars in shower;Built-in shower seat;Grab bars around toilet   Bathroom Accessibility Accessible via walker   Home Equipment Walker;Cane  (RW)   Additional Comments Pt's house is fully ADA accessible, reports her brother is in W/C   Prior Function   Level of [place] Independent with ADLs;Independent with functional mobility;Needs assistance with IADLS   Lives With Family  (brother.)   Receives Help From Family  (cousin comes over 3 hr/day, 7 days/week. Reports brother has HHA 1-2 hrs/day 7 days/week. Pt reports SHE is pt's primary caregiver, but also reports cousin is?)   IADLs Independent with driving;Independent with meal prep;Family/Friend/Other provides medication management  (cousin manages meds for pt. Pt reports droiving, but also reports cousin drives. Cousin manages groceries)   Falls in the last 6 months 1 to 4  (3 per pt. 1 leading to admission)  "  Vocational Retired   Comments Pt provides inconsistent answers with baseline function. Reports no AD used within the home, and assists her brother with completing transfers and ADLs. Also reports she sometimes needs assistance with ADLs. will defer to CM   Lifestyle   Autonomy At baseline, pt is (I) c ADLs, needs A with IADLs. no AD vs occasional RW vs SPC use. lives c handicapped brother. + driving + falls   Reciprocal Relationships supportive cousin. brother   Service to Others retired   Intrinsic Gratification used to enjoy crafts, but reports \"they are too difficult to clean up now\". Reports that she made masks during COVID, as well   General   Additional Pertinent History Pt admitted d/t ADITYA on CKD. Intitially present to ED d/t fall c HS, acute R shoulder pain. Hx of stage 4 ademocarcinoma of lung, moderate malnutrition, ambulatory dysfunction, CVA c residual memory and balance deficits per EMR.   Family/Caregiver Present No   Subjective   Subjective Pt agreeable to OT session   ADL   Where Assessed   (bathroom, recliner)   Eating Assistance 6  Modified independent   Grooming Assistance 5  Supervision/Setup   Grooming Deficit   (close supervision to stand at sink for hand washing)   UB Bathing Assistance 5  Supervision/Setup   LB Bathing Assistance 3  Moderate Assistance   UB Dressing Assistance 4  Minimal Assistance   LB Dressing Assistance 3  Moderate Assistance   LB Dressing Deficit Thread RLE into underwear;Increased time to complete;Supervision/safety;Requires assistive device for steadying;Setup  (threads LLE through underwear without A, but has difficulty clearing RLE from floor. intermittnet A to pull over hips.)   Toileting Assistance  4  Minimal Assistance   Toileting Deficit Increased time to complete;Supervison/safety;Verbal cueing;Setup   Functional Assistance 4  Minimal Assistance   Additional Comments pt limited by balance, weakness, decreased standing tolerance during ADLs   Bed Mobility " "  Supine to Sit 4  Minimal assistance   Additional items Assist x 1;Increased time required;Verbal cues;LE management   Additional Comments denies lightheaded/dizziness c all positional changes   Transfers   Sit to Stand 4  Minimal assistance   Additional items Assist x 1;Increased time required;Verbal cues   Stand to Sit 4  Minimal assistance   Additional items Assist x 1;Increased time required;Verbal cues   Toilet transfer 4  Minimal assistance   Additional items Assist x 1;Increased time required;Verbal cues;Standard toilet  (GB)   Additional Comments cues for RW management during approach to sitting surfaces,   Functional Mobility   Functional Mobility 4  Minimal assistance  (1 LOB retropulsively requiring Mod A for correction)   Additional Comments household distance, increased time required with cues needed for decreased foot clearance.   Additional items Rolling walker   Balance   Static Sitting Fair +   Dynamic Sitting Fair   Static Standing Fair -   Dynamic Standing Poor +   Activity Tolerance   Activity Tolerance Patient limited by fatigue   Medical Staff Made Aware Care coordination c PT Funmilayo.   Nurse Made Aware RUBI Silverman   RULOLY Assessment   RUE Assessment WFL   RUE Strength   RUE Overall Strength Within Functional Limits - strength 5/5   R Shoulder Flexion 4-/5   R Elbow Flexion 3+/5   R Elbow Extension 4/5   LUE Assessment   LUE Assessment WFL   LUE Strength   LUE Overall Strength Within Functional Limits - strength 5/5   L Shoulder Flexion 4-/5   L Elbow Flexion 3+/5   L Elbow Extension 4/5   Vision-Basic Assessment   Current Vision Wears glasses all the time  (not at bedside, \"I dont know where they are\")   Patient Visual Report   (pt denies acute changes in vision following HS)   Vision - Complex Assessment   Additional Comments denies photosensitivty, blurred vision, visual field changes   Perception   Inattention/Neglect Appears intact   Cognition   Overall Cognitive Status (S)  Impaired "   Arousal/Participation Alert;Cooperative   Attention Attends with cues to redirect   Orientation Level Oriented X4  (grossly oriented to month / year. Increased time required to recall year)   Memory Decreased recall of recent events;Decreased short term memory;Decreased recall of precautions   Following Commands Follows one step commands with increased time or repetition   Comments Pt with questionable insight, problem solving, safety/judgement. 2 recent falls c + head strikes. Pt denies all concussion sx on this date, educated to make RN aware of any sx that occur.  (per EMR, pt has residual memory loss after CVA, unclear acute vs baseline deficits)   Assessment   Limitation Decreased ADL status;Decreased UE strength;Decreased Safe judgement during ADL;Decreased cognition;Decreased endurance;Decreased self-care trans;Decreased high-level ADLs  (insight/ judgement, problem solving, safety awareness.)   Prognosis Good   Assessment Patient is a 73 y.o. female seen for OT evaluation at [facility] following admission on 5/28/2024  s/p Stage 4 chronic kidney disease (HCC). Please see above for comprehensive list of comorbidities and significant PMHx impacting functional performance.  Upon initial evaluation, pt appears to be performing below baseline functional status.   Occupational performance is affected by the following deficits: endurance ,  decreased muscular strength , decreased dynamic balance impacting functional reach, decreased trunk control , decreased activity tolerance , impaired memory , impaired judgement and problem solving , and impaired safety awareness . Personal/Environmental factors impacting D/C include: Assistance needed for ADLs and functional mobility and decreased insight toward deficits . Supporting factors include: accessible home environment Patient would benefit from OT services within the acute care setting to maximize level of functional independence in the following  areas self-care transfers, functional mobility, and ADLs.  From OT standpoint, recommendation at time of D/C would be Level 2: moderate resource intensity .   Goals   Patient Goals to get stronger.   Plan   Treatment Interventions ADL retraining;Functional transfer training;UE strengthening/ROM;Endurance training;Cognitive reorientation;Patient/family training;Equipment evaluation/education;Compensatory technique education;Continued evaluation  (cog assessment)   Goal Expiration Date 06/08/24   OT Treatment Day 0   OT Frequency 3-5x/wk   Discharge Recommendation   Rehab Resource Intensity Level, OT II (Moderate Resource Intensity)   Additional Comments  The patient's raw score on the AM-PAC Daily Activity Inpatient Short Form is 15. A raw score of less than 19 suggests the patient may benefit from discharge to post-acute rehabilitation services. Please refer to the recommendation of the Occupational Therapist for safe discharge planning.   -PAC Daily Activity Inpatient   Lower Body Dressing 2   Bathing 2   Toileting 2   Upper Body Dressing 3   Grooming 3   Eating 3   Daily Activity Raw Score 15   Daily Activity Standardized Score (Calc for Raw Score >=11) 34.69   AM-PAC Applied Cognition Inpatient   Following a Speech/Presentation 2   Understanding Ordinary Conversation 3   Taking Medications 2   Remembering Where Things Are Placed or Put Away 3   Remembering List of 4-5 Errands 2   Taking Care of Complicated Tasks 2   Applied Cognition Raw Score 14   Applied Cognition Standardized Score 32.02   End of Consult   Education Provided Yes   Patient Position at End of Consult Bedside chair;All needs within reach;Bed/Chair alarm activated   Nurse Communication Nurse aware of consult   Goals established on initial evaluation in order to achieve pt's goal of increasing functional independence and endurance during ADL tasks      Pt will complete UB ADLs Mod independent   for increased ADL independence within 10 days.      Pt will complete LB ADLs Supervision  for increased ADL independence within 10 days.     Pt will complete toileting Supervision  with use of DME for increased ADL independence within 10 days.     Pt will demonstrate proper body mechanics to complete self-care transfers and functional mobility with Supervision household distances and use of LRAD for increased safety and functional independence within 10 days.     Pt will complete bed mobility Mod independent  for increased independence in repositioning, pressure offloading, and managing comfort.     Pt will demonstrate proper body mechanics and fall prevention strategies during 100% of tx sessions for increased safety awareness during ADL/IADLs    Pt will demonstrate activity tolerance of 30 min in therapeutic tasks for increased participation in meaningful activities upon D/C.    Pt will participate in ongoing cognitive assessments to assist with safe D/C planning and supervision/assistance recommendations.     Pt will demonstrate OOB sitting tolerance of 2-4 hr/day for increased activity tolerance and engagement in leisure activities within 10 days.         Pt benefited from co-session of skilled OT and PT therapists in order to most appropriately address functional deficits d/t decreased activity tolerance.  OT/PT objectives were addressed separately; please see PT note for specific goal areas targeted.       Nguyen Riley, OT

## 2024-05-30 LAB
ANION GAP SERPL CALCULATED.3IONS-SCNC: 10 MMOL/L (ref 4–13)
BACTERIA UR QL AUTO: ABNORMAL /HPF
BILIRUB UR QL STRIP: NEGATIVE
BUDDING YEAST: PRESENT
BUN SERPL-MCNC: 39 MG/DL (ref 5–25)
CALCIUM SERPL-MCNC: 9 MG/DL (ref 8.4–10.2)
CHLORIDE SERPL-SCNC: 97 MMOL/L (ref 96–108)
CLARITY UR: ABNORMAL
CO2 SERPL-SCNC: 24 MMOL/L (ref 21–32)
COLOR UR: ABNORMAL
CREAT SERPL-MCNC: 1.51 MG/DL (ref 0.6–1.3)
CREAT UR-MCNC: 67.6 MG/DL
ERYTHROCYTE [DISTWIDTH] IN BLOOD BY AUTOMATED COUNT: 18.2 % (ref 11.6–15.1)
GFR SERPL CREATININE-BSD FRML MDRD: 33 ML/MIN/1.73SQ M
GLUCOSE SERPL-MCNC: 142 MG/DL (ref 65–140)
GLUCOSE SERPL-MCNC: 169 MG/DL (ref 65–140)
GLUCOSE SERPL-MCNC: 187 MG/DL (ref 65–140)
GLUCOSE SERPL-MCNC: 190 MG/DL (ref 65–140)
GLUCOSE SERPL-MCNC: 235 MG/DL (ref 65–140)
GLUCOSE UR STRIP-MCNC: NEGATIVE MG/DL
HCT VFR BLD AUTO: 32.4 % (ref 34.8–46.1)
HGB BLD-MCNC: 9.3 G/DL (ref 11.5–15.4)
HGB UR QL STRIP.AUTO: NEGATIVE
KETONES UR STRIP-MCNC: NEGATIVE MG/DL
LEUKOCYTE ESTERASE UR QL STRIP: ABNORMAL
MAGNESIUM SERPL-MCNC: 1.6 MG/DL (ref 1.9–2.7)
MCH RBC QN AUTO: 22.1 PG (ref 26.8–34.3)
MCHC RBC AUTO-ENTMCNC: 28.7 G/DL (ref 31.4–37.4)
MCV RBC AUTO: 77 FL (ref 82–98)
NITRITE UR QL STRIP: NEGATIVE
NON-SQ EPI CELLS URNS QL MICRO: ABNORMAL /HPF
PH UR STRIP.AUTO: 5.5 [PH]
PLATELET # BLD AUTO: 390 THOUSANDS/UL (ref 149–390)
PMV BLD AUTO: 10.2 FL (ref 8.9–12.7)
POTASSIUM SERPL-SCNC: 3.6 MMOL/L (ref 3.5–5.3)
PROT UR STRIP-MCNC: NEGATIVE MG/DL
RBC # BLD AUTO: 4.2 MILLION/UL (ref 3.81–5.12)
RBC #/AREA URNS AUTO: ABNORMAL /HPF
SODIUM 24H UR-SCNC: 17 MOL/L
SODIUM SERPL-SCNC: 131 MMOL/L (ref 135–147)
SP GR UR STRIP.AUTO: 1.01 (ref 1–1.03)
UROBILINOGEN UR STRIP-ACNC: <2 MG/DL
WBC # BLD AUTO: 11.71 THOUSAND/UL (ref 4.31–10.16)
WBC #/AREA URNS AUTO: ABNORMAL /HPF

## 2024-05-30 PROCEDURE — 83735 ASSAY OF MAGNESIUM: CPT

## 2024-05-30 PROCEDURE — 80048 BASIC METABOLIC PNL TOTAL CA: CPT

## 2024-05-30 PROCEDURE — 85027 COMPLETE CBC AUTOMATED: CPT

## 2024-05-30 PROCEDURE — 82948 REAGENT STRIP/BLOOD GLUCOSE: CPT

## 2024-05-30 PROCEDURE — 99233 SBSQ HOSP IP/OBS HIGH 50: CPT | Performed by: INTERNAL MEDICINE

## 2024-05-30 PROCEDURE — 97116 GAIT TRAINING THERAPY: CPT

## 2024-05-30 PROCEDURE — 84300 ASSAY OF URINE SODIUM: CPT

## 2024-05-30 PROCEDURE — 81001 URINALYSIS AUTO W/SCOPE: CPT

## 2024-05-30 PROCEDURE — 97110 THERAPEUTIC EXERCISES: CPT

## 2024-05-30 PROCEDURE — 82570 ASSAY OF URINE CREATININE: CPT

## 2024-05-30 RX ORDER — SODIUM CHLORIDE 9 MG/ML
50 INJECTION, SOLUTION INTRAVENOUS CONTINUOUS
Status: DISCONTINUED | OUTPATIENT
Start: 2024-05-30 | End: 2024-05-31

## 2024-05-30 RX ORDER — LANOLIN ALCOHOL/MO/W.PET/CERES
400 CREAM (GRAM) TOPICAL ONCE
Status: DISCONTINUED | OUTPATIENT
Start: 2024-05-30 | End: 2024-05-30

## 2024-05-30 RX ORDER — MIRTAZAPINE 15 MG/1
7.5 TABLET, FILM COATED ORAL
Status: DISCONTINUED | OUTPATIENT
Start: 2024-05-30 | End: 2024-05-31

## 2024-05-30 RX ORDER — MAGNESIUM SULFATE HEPTAHYDRATE 40 MG/ML
4 INJECTION, SOLUTION INTRAVENOUS ONCE
Status: COMPLETED | OUTPATIENT
Start: 2024-05-30 | End: 2024-05-30

## 2024-05-30 RX ADMIN — SODIUM CHLORIDE 50 ML/HR: 0.9 INJECTION, SOLUTION INTRAVENOUS at 16:29

## 2024-05-30 RX ADMIN — FOLIC ACID 1 MG: 1 TABLET ORAL at 08:34

## 2024-05-30 RX ADMIN — APIXABAN 5 MG: 5 TABLET, FILM COATED ORAL at 18:00

## 2024-05-30 RX ADMIN — GABAPENTIN 200 MG: 100 CAPSULE ORAL at 18:00

## 2024-05-30 RX ADMIN — SOTORASIB 960 MG: 120 TABLET, COATED ORAL at 08:37

## 2024-05-30 RX ADMIN — MAGNESIUM SULFATE HEPTAHYDRATE 4 G: 40 INJECTION, SOLUTION INTRAVENOUS at 09:49

## 2024-05-30 RX ADMIN — INSULIN LISPRO 1 UNITS: 100 INJECTION, SOLUTION INTRAVENOUS; SUBCUTANEOUS at 08:40

## 2024-05-30 RX ADMIN — INSULIN LISPRO 1 UNITS: 100 INJECTION, SOLUTION INTRAVENOUS; SUBCUTANEOUS at 22:29

## 2024-05-30 RX ADMIN — PANTOPRAZOLE SODIUM 40 MG: 40 TABLET, DELAYED RELEASE ORAL at 05:01

## 2024-05-30 RX ADMIN — ATORVASTATIN CALCIUM 40 MG: 40 TABLET, FILM COATED ORAL at 08:33

## 2024-05-30 RX ADMIN — MIRTAZAPINE 7.5 MG: 15 TABLET, FILM COATED ORAL at 21:29

## 2024-05-30 RX ADMIN — INSULIN LISPRO 1 UNITS: 100 INJECTION, SOLUTION INTRAVENOUS; SUBCUTANEOUS at 17:19

## 2024-05-30 RX ADMIN — VENLAFAXINE 100 MG: 37.5 TABLET ORAL at 21:32

## 2024-05-30 RX ADMIN — Medication 3 MG: at 21:30

## 2024-05-30 RX ADMIN — GABAPENTIN 200 MG: 100 CAPSULE ORAL at 08:34

## 2024-05-30 RX ADMIN — INSULIN LISPRO 2 UNITS: 100 INJECTION, SOLUTION INTRAVENOUS; SUBCUTANEOUS at 11:25

## 2024-05-30 RX ADMIN — BUSPIRONE HYDROCHLORIDE 7.5 MG: 5 TABLET ORAL at 21:31

## 2024-05-30 RX ADMIN — METOPROLOL TARTRATE 25 MG: 25 TABLET, FILM COATED ORAL at 08:34

## 2024-05-30 RX ADMIN — METOPROLOL TARTRATE 25 MG: 25 TABLET, FILM COATED ORAL at 21:30

## 2024-05-30 RX ADMIN — APIXABAN 5 MG: 5 TABLET, FILM COATED ORAL at 08:34

## 2024-05-30 RX ADMIN — ASPIRIN 81 MG: 81 TABLET, COATED ORAL at 08:34

## 2024-05-30 RX ADMIN — VITAM B12 100 MCG: 100 TAB at 08:34

## 2024-05-30 NOTE — CASE MANAGEMENT
Case Management Discharge Planning Note    Patient name Melany Griffith  Location S /S -01 MRN [id number]  : 1950 Date 2024       Current Admission Date: 2024  Current Admission Diagnosis:ADITYA on CKD   Patient Active Problem List    Diagnosis Date Noted Date Diagnosed    Severe protein-calorie malnutrition (HCC) 2024     Fall 2024     Ambulatory dysfunction 2024     Acute pain of right shoulder 2024     Sacral wound 2024     Moderate protein-calorie malnutrition (HCC) 2024     Nausea 2024     Shoulder pain 05/10/2024     Microcytic anemia 05/10/2024     Neck muscle spasm 2024     Port-A-Cath in place 2024     Gait abnormality 2023     Bowel trouble 2023     Joint pain 2023     Constipation 2023     ADITYA on CKD 2022     Anemia due to antineoplastic chemotherapy 2022     Acquired trigger finger of right ring finger 2022     Cognitive decline 2022     Lower extremity edema 2022     GERD (gastroesophageal reflux disease) 2022     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[facility]) 2022     History of stroke 2022     Hypomagnesemia 2022     Lung cancer (HCC) 2022     Former smoker 2021     Diarrhea 2021     Chemotherapy induced neutropenia ([facility]) 2021     Hypercalcemia 2020     Palliative care patient 10/21/2020     Anemia in stage 3 chronic kidney disease  (HCC) 2020     Pneumonitis 2020     CINV (chemotherapy-induced nausea and vomiting) 2020     Cyst of pancreas 2020     Neoplastic malignant related fatigue 10/08/2019     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) 10/02/2019     Medicare annual wellness visit, subsequent 2018     Anxiety 2018     Adhesive capsulitis of shoulder 2018     Current episode of major depressive disorder without prior episode 2017     Malignant  neoplasm of bone with metastases (HCC) 05/24/2017     Type 2 diabetes mellitus with stage 3 chronic kidney disease, without long-term current use of insulin, unspecified whether stage 3a or 3b CKD (HCC)      Cancer related pain 11/22/2016     Chronic diastolic heart failure (HCC) 11/20/2016     Adenocarcinoma of lung, stage 4 (HCC) 11/16/2016     Hyponatremia 11/16/2016     Paroxysmal atrial fibrillation (HCC) 08/27/2016     Primary hypertension 08/27/2016     Vitamin D deficiency 08/03/2015     Dyslipidemia 08/03/2015     Acid reflux 06/04/2015       LOS (days): 1  Geometric Mean LOS (GMLOS) (days): 3.1  Days to GMLOS:1.9     OBJECTIVE:  Risk of Unplanned Readmission Score: 33.59         Current admission status: Inpatient   Preferred Pharmacy:   [facility]/pharmacy #[id number] - [place] - [address]  [address] [place] [address]  Phone: [phone] Fax: [phone]    [facility] - [address] [address]  Phone: [phone] Fax: [phone]    Primary Care Provider: Salina Dwyer DO    Primary Insurance: MEDICARE  Secondary Insurance: [facility]    DISCHARGE DETAILS:    Discharge planning discussed with:: patient's dtr Liz  Freedom of Choice: Yes  Comments - Freedom of Choice: CM received return call from pt's dtr Liz. Dtr relayed pt drives self short distances to grocery story and appointments. Dtr relayed pt seems to have no concept of time since her stroke about two years ago. Pt seems also to not have bathed self within 2 weeks from dtr's last visit and dtr's previous visit last weekend prior to current hospital admission. Dtr relayed assisted pt in bathing during both visits. Per dtr pt normally bathes self utilizing shower chair and dresses self. Per dtr - pt's home is completely handicap accessible as pt's brother is quadriplegic. Dtr relayed pt has also not been eating or drinking well in the last few months and  has lost a significant amount of weight. Dtr reported pt doesn't often cook anymore but pt and brother have been eating TV dinners. Dtr inquired into HHA information and Mom's Meals or Meals on Wheels for patient upon return to home from STR. CM sent HHA and Meals on Wheels info to dtr via email at [email or web address]. Dtr aware would be private pay cost for HHA services as dtr relayed pt would likely not meet financial criteria to qualify for waiver funded services. Dtr also relayed additional STR preferences for [facility] and [facility] if [facility] not available. CM to f/u w/ pt and daughter pending STR bed offers received via aidin.  CM contacted family/caregiver?: Yes  Were Treatment Team discharge recommendations reviewed with patient/caregiver?: Yes  Did patient/caregiver verbalize understanding of patient care needs?: N/A- going to facility  Were patient/caregiver advised of the risks associated with not following Treatment Team discharge recommendations?: Yes    Contacts  Patient Contacts: Liz Leobardo (Daughter)  Relationship to Patient:: Family  Contact Method: Phone  Phone Number: [phone]  Reason/Outcome: Discharge Planning

## 2024-05-30 NOTE — ASSESSMENT & PLAN NOTE
S/p mechanical fall with no LOC. On eliquis. Sustained hit to back of the head and R shoulder. Trauma workup negative. Not currently in any pain.     Plan  Tylenol prn for pain control  PT/OT recommending PAR, which patient agrees is a good idea  Fall precautions

## 2024-05-30 NOTE — CASE MANAGEMENT
Case Management Discharge Planning Note    Patient name Melany Griffith  Location S /S -01 MRN [id number]  : 1950 Date 2024       Current Admission Date: 2024  Current Admission Diagnosis:ADITYA on CKD   Patient Active Problem List    Diagnosis Date Noted Date Diagnosed    Severe protein-calorie malnutrition (HCC) 2024     Fall 2024     Ambulatory dysfunction 2024     Acute pain of right shoulder 2024     Sacral wound 2024     Moderate protein-calorie malnutrition (HCC) 2024     Nausea 2024     Shoulder pain 05/10/2024     Microcytic anemia 05/10/2024     Neck muscle spasm 2024     Port-A-Cath in place 2024     Gait abnormality 2023     Bowel trouble 2023     Joint pain 2023     Constipation 2023     ADITYA on CKD 2022     Anemia due to antineoplastic chemotherapy 2022     Acquired trigger finger of right ring finger 2022     Cognitive decline 2022     Lower extremity edema 2022     GERD (gastroesophageal reflux disease) 2022     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[facility]) 2022     History of stroke 2022     Hypomagnesemia 2022     Lung cancer (HCC) 2022     Former smoker 2021     Diarrhea 2021     Chemotherapy induced neutropenia ([facility]) 2021     Hypercalcemia 2020     Palliative care patient 10/21/2020     Anemia in stage 3 chronic kidney disease  (HCC) 2020     Pneumonitis 2020     CINV (chemotherapy-induced nausea and vomiting) 2020     Cyst of pancreas 2020     Neoplastic malignant related fatigue 10/08/2019     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) 10/02/2019     Medicare annual wellness visit, subsequent 2018     Anxiety 2018     Adhesive capsulitis of shoulder 2018     Current episode of major depressive disorder without prior episode 2017     Malignant  neoplasm of bone with metastases (HCC) 05/24/2017     Type 2 diabetes mellitus with stage 3 chronic kidney disease, without long-term current use of insulin, unspecified whether stage 3a or 3b CKD (HCC)      Cancer related pain 11/22/2016     Chronic diastolic heart failure (HCC) 11/20/2016     Adenocarcinoma of lung, stage 4 (HCC) 11/16/2016     Hyponatremia 11/16/2016     Paroxysmal atrial fibrillation (HCC) 08/27/2016     Primary hypertension 08/27/2016     Vitamin D deficiency 08/03/2015     Dyslipidemia 08/03/2015     Acid reflux 06/04/2015       LOS (days): 1  Geometric Mean LOS (GMLOS) (days): 3.1  Days to GMLOS:1.9     OBJECTIVE:  Risk of Unplanned Readmission Score: 33.59         Current admission status: Inpatient   Preferred Pharmacy:   [facility]/pharmacy #[id number] - [place] [address] [place] [address]  Phone: [phone] Fax: [phone]    [facility] - [address] [address]  Phone: [phone] Fax: [phone]    Primary Care Provider: Salina Dwyer DO    Primary Insurance: MEDICARE  Secondary Insurance: [employer]    DISCHARGE DETAILS:    Discharge planning discussed with:: patient  Freedom of Choice: Yes  Comments - Freedom of Choice: CM met with pt at bedside re: assessment and dcp. Patient relayed she lives w/ brother in two story home, bed/bath on both floors (able to remain on main floor if needed). Patient reported she is independent w/ ADLS (occasional assist w/ dressing), utilizes either her RW or cane to ambulate, has hx of HHC, and drives self to appointments. Patient relayed she is primary caregiver for her brother but cousin also helps brother a few hours a day along with HHA 1-2 hrs a day. PT/OT rcmd STR for pt. Pt relayed she is open to STR blanket referral with preferenc [facility] if available. STR blanket referral sent via aidin, awaiting responses.  CM contacted  family/caregiver?: Yes (attempted to call pt's dtr - left VM re: dcp update and requesting return call)  Were Treatment Team discharge recommendations reviewed with patient/caregiver?: Yes  Did patient/caregiver verbalize understanding of patient care needs?: N/A- going to facility  Were patient/caregiver advised of the risks associated with not following Treatment Team discharge recommendations?: Yes    Contacts  Patient Contacts: Liz Booth (Daughter)  Relationship to Patient:: Family  Contact Method: Phone  Phone Number: [phone]  Reason/Outcome: Discharge Planning    Requested Home Health Care         Is the patient interested in HHC at discharge?: No    DME Referral Provided  Referral made for DME?: No    Other Referral/Resources/Interventions Provided:  Interventions: Short Term Rehab  Referral Comments: PT/OT rcmd STR. STR blanket referral sent via aidin, awaiting responses. Pt preference for [facility] if available.         Treatment Team Recommendation: Short Term Rehab  Discharge Destination Plan:: Short Term Rehab  Transport at Discharge : Wheelchair van

## 2024-05-30 NOTE — PROGRESS NOTES
[facility]  Progress Note  Name: Melany Griffith I  MRN: [id number]  Unit/Bed#: S -01 I Date of Admission: 5/28/2024   Date of Service: 5/30/2024 I Hospital Day: 1    Assessment & Plan   * ADITYA on CKD  Assessment & Plan  Lab Results   Component Value Date    EGFR 33 05/30/2024    EGFR 32 05/29/2024    EGFR 32 05/28/2024    CREATININE 1.51 (H) 05/30/2024    CREATININE 1.57 (H) 05/29/2024    CREATININE 1.57 (H) 05/28/2024       POA Cr 1.57. Baseline appears to be around 1. Consider most likely secondary to hypovolemia given that she endorses poor PO intake recently, as well as recent increase in prn lasix use for lower extremity swelling. Consider swelling most likely a medication side effect from Sotorasib.     Plan  Gentle hydration NS  UA: moderate leuks, asymptomatic, will not treat at this time  Urine sodium: 17  Urine creatinine: 144, crt:albumin ratio of 25  Bladder scan/urinary retention protocol  Home lasix, sotalol, valsartan held for now  Promote normotension, avoid nephrotoxins    Fall  Assessment & Plan  S/p mechanical fall with no LOC. On eliquis. Sustained hit to back of the head and R shoulder. Trauma workup negative. Not currently in any pain.     Plan  Tylenol prn for pain control  PT/OT recommending PAR, which patient agrees is a good idea  Fall precautions    Adenocarcinoma of lung, stage 4 (HCC)  Assessment & Plan  S/p chemo, radiation, and immunotherapy. Follows w/ Dr. Sam outpatient. Currently on Sotorasib only.     Plan  Will provide sotorasib from home    Sacral wound  Assessment & Plan  Small wound present in the R sacral/buttock area. Small amount of scabbing present, no current discharge. Low suspicion of infection.     Plan  Wound care consult  No need for abx at this time    Acute pain of right shoulder  Assessment & Plan  S/p fall. Trauma XR's all negative. Tylenol 975 prn.     Ambulatory dysfunction  Assessment & Plan  Recent increased weakness with  several recent falls. She endorses significant weight loss and poor appetite over the past 2 months. Lives at home, occasionally uses cane or walker but generally does not. Orthostatic vitals negative.    Plan  PT/OT - recommended inpatient rehab, patient amenable to going  Fall precautions  Nutrition consult      Type 2 diabetes mellitus with stage 3 chronic kidney disease, without long-term current use of insulin, unspecified whether stage 3a or 3b CKD ([facility])  Assessment & Plan  Lab Results   Component Value Date    HGBA1C 7.2 (H) 05/08/2024       Recent Labs     05/29/24  1241 05/29/24  1626 05/29/24  2130 05/30/24  0745   POCGLU 147* 131 205* 169*       Blood Sugar Average: Last 72 hrs:  (P) 159.9840775140014274    Hold home medications. SSI algorithm 1, titrate as needed.     Hyponatremia  Assessment & Plan  Recent Labs     05/28/24  1602 05/29/24  0442 05/30/24  0534   SODIUM 130* 131* 131*           Likely 2/2 poor PO intake. Checked Urine sodium, appropriately low. Encourage PO intake and continue to monitor.     Constipation  Assessment & Plan  She notes ongoing issues with constipation at home. Miralax and Senna both ordered daily. Increase/reduce as needed.     Lower extremity edema  Assessment & Plan  Chronic but worsening recently. She does have a history of grade 1 diastolic dysfunction, but denies any recent SOB. Consider most likely secondary to Sotorasib use as this has been a reported side effect and pt does not appear to be in CHF exacerbation.     Plan  Apply compression stockings  B/L lower extremity doppler wnl, no clots  Continue Sotorasib for now    Chronic diastolic heart failure (HCC)  Assessment & Plan  Wt Readings from Last 3 Encounters:   05/30/24 68.4 kg (150 lb 12.7 oz)   05/21/24 69.3 kg (152 lb 12.8 oz)   05/20/24 71.4 kg (157 lb 8 oz)     Last Echo in 2022 showed EF 60, grade 1 diastolic dysfunction.   Lungs clear on exam, no SOB. 3+ lower extremitiy edema on exam, consider  likely medication side effect from Sotorasib.   Home Lasix recently increased to 40 mg daily prn for swelling likely causing ADITYA    Plan  Gentle hydration for ADITYA  Home lasix held due to ADITYA  Continue to monitor volume status. Stop fluids if volume overload suspected.    Paroxysmal atrial fibrillation (HCC)  Assessment & Plan  Continue home eliquis, metoprolol  Home sotalol held until renal function improves    Moderate protein-calorie malnutrition (HCC)  Assessment & Plan  Malnutrition Findings:   Adult Malnutrition type: Chronic illness  Adult Degree of Malnutrition: Other severe protein calorie malnutritionShe endorsed poor appetite and significant weight loss over the past 2 months, not completely sure how much. Nutrition consulted, input appreciated. Ensure ordered 10/2/HS.   Malnutrition Characteristics: Weight loss, Inadequate energy, Muscle loss                  360 Statement: Chronic/severe malnutrition r/t decreased PO intake/condition, as evidenced by 9.5% wt loss x 3 months (5/21/24: 152#, 2/27/24: 168#),  intake meeting <75% of estimated needs for > 1 month, and severe muscle mass depletion (temporal region). Treatment: liberal PO diet + nutrition supplements    BMI Findings:           Body mass index is 27.58 kg/m².   Remeron 7.5 tonight for appetite, sleep and mood    Severe protein-calorie malnutrition (HCC)  Assessment & Plan  Malnutrition Findings:   Adult Malnutrition type: Chronic illness  Adult Degree of Malnutrition: Other severe protein calorie malnutrition  Malnutrition Characteristics: Weight loss, Inadequate energy, Muscle loss                  360 Statement: Chronic/severe malnutrition r/t decreased PO intake/condition, as evidenced by 9.5% wt loss x 3 months (5/21/24: 152#, 2/27/24: 168#),  intake meeting <75% of estimated needs for > 1 month, and severe muscle mass depletion (temporal region). Treatment: liberal PO diet + nutrition supplements    BMI Findings:           Body mass index  is 27.58 kg/m².   See above         VTE Pharmacologic Prophylaxis: VTE Score: 7 High Risk (Score >/= 5) - Pharmacological DVT Prophylaxis Ordered: apixaban (Eliquis). Sequential Compression Devices Ordered.    Mobility:   Basic Mobility Inpatient Raw Score: 15  JH-HLM Goal: 4: Move to chair/commode  JH-HLM Achieved: 4: Move to chair/commode  JH-HLM Goal achieved. Continue to encourage appropriate mobility.    Patient Centered Rounds: I performed bedside rounds with nursing staff today.  Discussions with Specialists or Other Care Team Provider: attending physician    Education and Discussions with Family / Patient: Patient declined call to .     Current Length of Stay: 1 day(s)  Current Patient Status: Inpatient   Discharge Plan: Anticipate discharge in 24-48 hrs to rehab facility.    Code Status: Level 3 - DNAR and DNI    Subjective:   Melany was evaluated at bedside this morning. She denies chest pain, shortness of breath, abdominal pain, urinary frequency, urgency or pain. She is amenable to going to post acute rehab. She reports her mood is stable and enjoys crafting. Agreeable to starting medication to help with sleep, melatonin helped somewhat last night. Reports no difficulty with appetite.     Objective:   Vitals:   Temp (24hrs), Av.5 °F (36.4 °C), Min:97.3 °F (36.3 °C), Max:97.7 °F (36.5 °C)    Temp:  [97.3 °F (36.3 °C)-97.7 °F (36.5 °C)] 97.3 °F (36.3 °C)  HR:  [54-91] 54  Resp:  [16-18] 18  BP: (101-142)/(62-79) 125/76  SpO2:  [86 %-100 %] 96 %  Body mass index is 27.58 kg/m².     Input and Output Summary (last 24 hours):     Intake/Output Summary (Last 24 hours) at 2024 0970  Last data filed at 2024 0542  Gross per 24 hour   Intake --   Output 200 ml   Net -200 ml       Physical Exam:   Physical Exam  Vitals and nursing note reviewed.   Constitutional:       Appearance: Normal appearance.   HENT:      Head: Normocephalic and atraumatic.      Nose: Nose normal.       Mouth/Throat:      Mouth: Mucous membranes are moist.   Eyes:      Pupils: Pupils are equal, round, and reactive to light.   Cardiovascular:      Rate and Rhythm: Normal rate. Rhythm irregular.      Pulses: Normal pulses.      Heart sounds: Normal heart sounds.   Pulmonary:      Effort: Pulmonary effort is normal. No respiratory distress.      Breath sounds: Normal breath sounds. No wheezing or rales.   Abdominal:      General: Abdomen is flat. There is no distension.      Palpations: Abdomen is soft.      Tenderness: There is no abdominal tenderness.   Musculoskeletal:      Right lower leg: Edema present.      Left lower leg: Edema present.      Comments: Stable BRITANY   Skin:     General: Skin is warm and dry.      Capillary Refill: Capillary refill takes less than 2 seconds.      Coloration: Skin is not jaundiced.      Findings: No erythema.   Neurological:      General: No focal deficit present.      Mental Status: She is alert and oriented to person, place, and time.   Psychiatric:         Mood and Affect: Mood normal.         Behavior: Behavior normal.      Comments: Mildly slow. At times stares off into space and does not answer questions until they are repeated       Additional Data:   Labs:  Results from last 7 days   Lab Units 05/30/24  0534 05/29/24  0442   WBC Thousand/uL 11.71* 13.95*   HEMOGLOBIN g/dL 9.3* 10.1*   HEMATOCRIT % 32.4* 34.8   PLATELETS Thousands/uL 390 516*   SEGS PCT %  --  88*   LYMPHO PCT %  --  6*   MONO PCT %  --  5   EOS PCT %  --  0     Results from last 7 days   Lab Units 05/30/24  0534 05/28/24  1605 05/28/24  1602   SODIUM mmol/L 131*   < > 130*   POTASSIUM mmol/L 3.6   < > 4.8   CHLORIDE mmol/L 97   < > 95*   CO2 mmol/L 24   < > 23   CO2, I-STAT   --    < >  --    BUN mg/dL 39*   < > 37*   CREATININE mg/dL 1.51*   < > 1.57*   ANION GAP mmol/L 10   < > 12   CALCIUM mg/dL 9.0   < > 9.2   ALBUMIN g/dL  --   --  3.2*   TOTAL BILIRUBIN mg/dL  --   --  0.57   ALK PHOS U/L  --   --  95    ALT U/L  --   --  11   AST U/L  --   --  13   GLUCOSE RANDOM mg/dL 142*   < > 118    < > = values in this interval not displayed.         Results from last 7 days   Lab Units 05/30/24  0745 05/29/24  2130 05/29/24  1626 05/29/24  1241 05/29/24  0738 05/28/24  2109   POC GLUCOSE mg/dl 169* 205* 131 147* 156* 148*               Lines/Drains:  Invasive Devices       Central Venous Catheter Line  Duration             Port A Cath 12/14/21 Right Chest 897 days              Peripheral Intravenous Line  Duration             Peripheral IV 05/29/24 Left Antecubital <1 day                    Central Line:  Goal for removal: N/A - Chronic PICC           Imaging: Reviewed radiology reports from this admission including: CT head    Recent Cultures (last 7 days):         Last 24 Hours Medication List:   Current Facility-Administered Medications   Medication Dose Route Frequency Provider Last Rate    acetaminophen  975 mg Oral Q8H PRN Rito Sheridan, DO      apixaban  5 mg Oral BID Rito Sheridan, DO      aspirin  81 mg Oral Daily Rito Sheridan, DO      atorvastatin  40 mg Oral Daily Rito Sheridan, DO      busPIRone  7.5 mg Oral HS Rito Sheridan, DO      cyanocobalamin  100 mcg Oral Daily Rito Sheridan, DO      diphenhydrAMINE  25 mg Oral HS PRN Rito Sheridan, DO      folic acid  1 mg Oral Daily Rito Sheridan, DO      gabapentin  200 mg Oral BID Rito Sheridan, DO      insulin lispro  1-5 Units Subcutaneous TID AC Rito Sheridan, DO      insulin lispro  1-5 Units Subcutaneous HS Rito Sheridan, DO      magnesium sulfate  4 g Intravenous Once Emmanuelle Velasquez MD 4 g (05/30/24 0949)    melatonin  3 mg Oral HS Marta Lazo MD      metoprolol tartrate  25 mg Oral Q12H Rito Sheridan, DO      mirtazapine  7.5 mg Oral HS Emmanuelle Velasquez MD      pantoprazole  40 mg Oral Early Morning Rito Sheridan, DO      polyethylene glycol  17 g Oral Daily Rito Sheridan, DO      prochlorperazine  10 mg Oral TID PRN Rito Sheridan, DO      senna  1 tablet Oral Daily Rito  DO Fatimah      Sotorasib  960 mg Oral Daily Rito Sheridan DO      venlafaxine  100 mg Oral HS Rito Sheridan DO          Today, Patient Was Seen By: Emmanuelle Velasquez MD    **Please Note: This note may have been constructed using a voice recognition system.**

## 2024-05-30 NOTE — ASSESSMENT & PLAN NOTE
Lab Results   Component Value Date    HGBA1C 7.2 (H) 05/08/2024       Recent Labs     05/29/24  1241 05/29/24  1626 05/29/24  2130 05/30/24  0745   POCGLU 147* 131 205* 169*       Blood Sugar Average: Last 72 hrs:  (P) 159.4472990173531040    Hold home medications. SSI algorithm 1, titrate as needed.

## 2024-05-30 NOTE — WOUND OSTOMY CARE
Progress Note - Wound   Melany Griffith 73 y.o. female MRN: [id number]  Unit/Bed#: S -01 Encounter: [id number]      Assessment:     Patient is a 72 yo female that was admitted to [facility]  for treatment of ADITYA on CKD. Patient has a PMH of  stage IV lung adenocarcinoma s/p chemo, radiation, immunotherapy, currently on Sotorasib, paroxysmal afib on eliquis, HFrEF, DM2, CVA . Patient is alert and oriented times three and agreeable to assessment. Patient is assist of one for turning and repositioning, assist for care, independent with meals. Nutrient consulted and following. Patient is continent of bowel and bladder. On assessment, patient is OOB to recliner with feet elevated.    Wound Care was consulted for right sacral wound.     POA Right Buttock pressure injury Stage 2 - small irregular shaped area of partial thickness skin loss. Wound bed is 20% pink tissue and 80% dried yellow tissue. No drainage appreciated. Suspect etiology of pressure.     Left Buttock, b/l hips, heels - pink, dry, intact, blanchable    Educated patient on importance of frequent offloading of pressure via turning, repositioning, and weight-shifting.     No induration, fluctuance, odor, warmth, or purulence noted to the above noted wounds. calazime applied. Patient tolerated well, reports mild pain to the wounds. Primary nurse aware of plan of care. See flow sheets for more detailed assessment findings. Will follow along.     Skin care plans:  1- Wash Sacro Buttocks area with soap and water. Pat Dry. Apply Calazime to right buttocks TID and PRN.  2-Hydraguard to left buttock, b/l hips, b/l heels BID and PRN.  3-Elevate heels to offload pressure.  4-Ehob cushion when out of bed.  5-Turn/reposition q2h or when medically stable for pressure re-distribution on skin.  6-Moisturize skin daily with skin nourishing cream.    Wound 05/29/24 Pressure Injury Buttocks Right (Active)   Wound Image   05/29/24 1350   Wound Description Drainage;Pink 05/29/24  1350   Pressure Injury Stage 2 05/29/24 1350   Beth-wound Assessment Fragile;Erythema 05/29/24 1350   Wound Length (cm) 0.5 cm 05/29/24 1350   Wound Width (cm) 0.5 cm 05/29/24 1350   Wound Depth (cm) 0.1 cm 05/29/24 1350   Wound Surface Area (cm^2) 0.25 cm^2 05/29/24 1350   Wound Volume (cm^3) 0.025 cm^3 05/29/24 1350   Calculated Wound Volume (cm^3) 0.03 cm^3 05/29/24 1350   Drainage Amount None 05/29/24 1350   Non-staged Wound Description Partial thickness 05/29/24 1350   Treatments Cleansed;Site care 05/29/24 1350   Dressing Protective barrier 05/29/24 1350   Patient Tolerance Tolerated well 05/29/24 1350       Call or tigertext with any questions  Wound Care will continue to follow while inpatient.    Laury Chan BSN RN CCRN  Wound and Ostomy Care

## 2024-05-30 NOTE — ASSESSMENT & PLAN NOTE
Recent Labs     05/28/24  1602 05/29/24  0442 05/30/24  0534   SODIUM 130* 131* 131*           Likely 2/2 poor PO intake. Checked Urine sodium, appropriately low. Encourage PO intake and continue to monitor.

## 2024-05-30 NOTE — ASSESSMENT & PLAN NOTE
Malnutrition Findings:   Adult Malnutrition type: Chronic illness  Adult Degree of Malnutrition: Other severe protein calorie malnutrition  Malnutrition Characteristics: Weight loss, Inadequate energy, Muscle loss                  360 Statement: Chronic/severe malnutrition r/t decreased PO intake/condition, as evidenced by 9.5% wt loss x 3 months (5/21/24: 152#, 2/27/24: 168#),  intake meeting <75% of estimated needs for > 1 month, and severe muscle mass depletion (temporal region). Treatment: liberal PO diet + nutrition supplements    BMI Findings:           Body mass index is 27.58 kg/m².   See above

## 2024-05-30 NOTE — DISCHARGE INSTR - OTHER ORDERS
Skin care plans:  1- Wash Sacro Buttocks area with soap and water. Pat Dry. Apply Calazime to right buttocks TID and PRN.  2-Hydraguard to left buttock, b/l hips, b/l heels BID and PRN.  3-Elevate heels to offload pressure.  4-Ehob cushion when out of bed.  5-Turn/reposition q2h or when medically stable for pressure re-distribution on skin.  6-Moisturize skin daily with skin nourishing cream

## 2024-05-30 NOTE — PLAN OF CARE
Problem: PHYSICAL THERAPY ADULT  Goal: Performs mobility at highest level of function for planned discharge setting.  See evaluation for individualized goals.  Description: Treatment/Interventions: Functional transfer training, LE strengthening/ROM, Therapeutic exercise, Endurance training, Cognitive reorientation, Patient/family training, Equipment eval/education, Gait training, Bed mobility, Compensatory technique education  Equipment Recommended: Walker       See flowsheet documentation for full assessment, interventions and recommendations.  Outcome: Progressing  Note: Prognosis: Good  Problem List: Decreased strength, Decreased endurance, Impaired balance, Decreased mobility, Decreased cognition, Impaired judgement, Decreased safety awareness  Assessment: pt began tx session seated OOB in the recliner as pt was agreeable to participate in PT intervention. PT intervention provided included transfer training, TE activities, posture/balance w/ gait, increasing activity toleranec and endurance in order to challenge pt activity leveland progress pt towards PLOF. In comparison to previous tx session pt continues to remain consistant fro requiring min Ax1 for all functional transfers to and from RW with VC's for hand placement in order to increase safety and balance. Although pt increased ambulation distance to 130'x1 RW min Ax1 pt had LOB that required min Ax1 to correct. pt was limited with activity tolerance due to fatigue and generalized weakness as pt required several therapeutic seated rest breaks in todays tx session. pt was able to participate in TE activities while seated in recliner in order to strengthen LE's and increase activity tolerance w/o increases in pain and good form throughout exercises. Post tx pt continues to demonstrate the following deficits: limited activity tolerance, ambulation distance and impaired balance w/ all OOB activities. Continue to recommend DC w/ level 2 moderate rehab resource  intensity when medically cleared  Barriers to Discharge: Decreased caregiver support (pt is her brother's caregiver)     Rehab Resource Intensity Level, PT: II (Moderate Resource Intensity)    See flowsheet documentation for full assessment.

## 2024-05-30 NOTE — ASSESSMENT & PLAN NOTE
Recent increased weakness with several recent falls. She endorses significant weight loss and poor appetite over the past 2 months. Lives at home, occasionally uses cane or walker but generally does not. Orthostatic vitals negative.    Plan  PT/OT - recommended inpatient rehab, patient amenable to going  Fall precautions  Nutrition consult

## 2024-05-30 NOTE — PHYSICAL THERAPY NOTE
PHYSICAL THERAPY NOTE          Patient Name: Melany Griffith  Today's Date: 5/30/2024 05/30/24 1339   PT Last Visit   PT Visit Date 05/30/24   Note Type   Note Type Treatment   Pain Assessment   Pain Assessment Tool 0-10   Pain Score No Pain   Patient's Stated Pain Goal No pain   Hospital Pain Intervention(s) Repositioned;Ambulation/increased activity;Elevated;Rest   Multiple Pain Sites No   Restrictions/Precautions   Weight Bearing Precautions Per Order No   Other Precautions Cognitive;Chair Alarm;Bed Alarm;Fall Risk  (masimo, I.V)   General   Chart Reviewed Yes   Response to Previous Treatment Patient with no complaints from previous session.   Family/Caregiver Present No   Cognition   Overall Cognitive Status (S)  Impaired   Arousal/Participation Alert;Responsive;Cooperative   Attention Attends with cues to redirect   Orientation Level Oriented X4   Memory Decreased short term memory;Decreased recall of recent events;Decreased recall of precautions   Following Commands Follows one step commands with increased time or repetition   Comments pt was pleasant and cooperative in todays tx session but remains questionable with limited insight into deficits   Subjective   Subjective pt was agreeable to partciipate in PT intervention as pt stated 0/10 pain pre/post tx session   Bed Mobility   Additional Comments pt seated OOB in the recliner pre/post tx session with call bell, chair alarm activated and legs elevated   Transfers   Sit to Stand 4  Minimal assistance   Additional items Assist x 1;Armrests;Increased time required;Verbal cues   Stand to Sit 4  Minimal assistance   Additional items Assist x 1;Armrests;Verbal cues;Increased time required  (Vc's to not abandon RW to early prior to sitting in the recliner)   Stand pivot Unable to assess   Additional Comments pt continues to require min Ax1 for all functional transfers to  and from RW with VC's for hand placement and management of RW   Ambulation/Elevation   Gait pattern Improper Weight shift;Decreased foot clearance;Short stride;Excessively slow;Decreased hip extension;Decreased heel strike;Decreased toe off   Gait Assistance 4  Minimal assist   Additional items Assist x 1;Verbal cues   Assistive Device Rolling walker   Distance 130'x1 RW   Balance   Static Sitting Fair +   Dynamic Sitting Fair   Static Standing Fair -   Dynamic Standing Poor +   Ambulatory Poor +  (w/ RW)   Endurance Deficit   Endurance Deficit Yes   Endurance Deficit Description limited activity tolerance and ambulation distance   Activity Tolerance   Activity Tolerance Patient limited by fatigue;Other (Comment)  (generalized weakness)   Nurse Made Aware Spoke to RN   Exercises   Quad Sets Sitting;10 reps;AROM;Bilateral   Hip Abduction Sitting;10 reps;AROM;Bilateral  (long sit position)   Hip Adduction Sitting;10 reps;AROM;Bilateral  (long sit position)   Knee AROM Short Arc Quad Sitting;10 reps;AROM;Bilateral   Knee AROM Long Arc Quad Sitting;15 reps;AROM;Bilateral   Ankle Pumps Sitting;20 reps;AROM;Bilateral   Marching Sitting;15 reps;AROM;Bilateral   Assessment   Prognosis Good   Problem List Decreased strength;Decreased endurance;Impaired balance;Decreased mobility;Decreased cognition;Impaired judgement;Decreased safety awareness   Assessment pt began tx session seated OOB in the recliner as pt was agreeable to participate in PT intervention. PT intervention provided included transfer training, TE activities, posture/balance w/ gait, increasing activity toleranec and endurance in order to challenge pt activity leveland progress pt towards PLOF. In comparison to previous tx session pt continues to remain consistant fro requiring min Ax1 for all functional transfers to and from RW with VC's for hand placement in order to increase safety and balance. Although pt increased ambulation distance to 130'x1 RW min Ax1  pt had LOB that required min Ax1 to correct. pt was limited with activity tolerance due to fatigue and generalized weakness as pt required several therapeutic seated rest breaks in todays tx session. pt was able to participate in TE activities while seated in recliner in order to strengthen LE's and increase activity tolerance w/o increases in pain and good form throughout exercises. Post tx pt continues to demonstrate the following deficits: limited activity tolerance, ambulation distance and impaired balance w/ all OOB activities. Continue to recommend DC w/ level 2 moderate rehab resource intensity when medically cleared   Goals   Patient Goals none stated   STG Expiration Date 06/08/24   PT Treatment Day 2   Plan   Treatment/Interventions Functional transfer training;LE strengthening/ROM;Therapeutic exercise;Endurance training;Cognitive reorientation;Patient/family training;Equipment eval/education;Bed mobility;Gait training;Spoke to nursing;Compensatory technique education   Progress Slow progress, decreased activity tolerance   PT Frequency 3-5x/wk   Discharge Recommendation   Rehab Resource Intensity Level, PT II (Moderate Resource Intensity)   Equipment Recommended Walker   Walker Package Recommended Wheeled walker   Change/add to Walker Package? No   AM-PAC Basic Mobility Inpatient   Turning in Flat Bed Without Bedrails 3   Lying on Back to Sitting on Edge of Flat Bed Without Bedrails 2   Moving Bed to Chair 3   Standing Up From Chair Using Arms 3   Walk in Room 3   Climb 3-5 Stairs With Railing 1   Basic Mobility Inpatient Raw Score 15   Basic Mobility Standardized Score 36.97   [facility] Highest Level Of Mobility   -[facility] Goal 4: Move to chair/commode   -HL Achieved 7: Walk 25 feet or more   Education   Education Provided Mobility training;Assistive device   Patient Reinforcement needed   End of Consult   Patient Position at End of Consult Bedside chair;Bed/Chair alarm activated;All needs within  reach   The patient's AM-PAC Basic Mobility Inpatient Short Form Raw Score is 15. A Raw score of less than or equal to 16 suggests the patient may benefit from discharge to post-acute rehabilitation services. Please also refer to the recommendation of the Physical Therapist for safe discharge planning.    James Ro

## 2024-05-30 NOTE — ASSESSMENT & PLAN NOTE
Chronic but worsening recently. She does have a history of grade 1 diastolic dysfunction, but denies any recent SOB. Consider most likely secondary to Sotorasib use as this has been a reported side effect and pt does not appear to be in CHF exacerbation.     Plan  Apply compression stockings  B/L lower extremity doppler wnl, no clots  Continue Sotorasib for now

## 2024-05-30 NOTE — ASSESSMENT & PLAN NOTE
Wt Readings from Last 3 Encounters:   05/30/24 68.4 kg (150 lb 12.7 oz)   05/21/24 69.3 kg (152 lb 12.8 oz)   05/20/24 71.4 kg (157 lb 8 oz)     Last Echo in 2022 showed EF 60, grade 1 diastolic dysfunction.   Lungs clear on exam, no SOB. 3+ lower extremitiy edema on exam, consider likely medication side effect from Sotorasib.   Home Lasix recently increased to 40 mg daily prn for swelling likely causing ADITYA    Plan  Gentle hydration for ADITYA  Home lasix held due to ADITYA  Continue to monitor volume status. Stop fluids if volume overload suspected.

## 2024-05-30 NOTE — ASSESSMENT & PLAN NOTE
Malnutrition Findings:   Adult Malnutrition type: Chronic illness  Adult Degree of Malnutrition: Other severe protein calorie malnutritionShe endorsed poor appetite and significant weight loss over the past 2 months, not completely sure how much. Nutrition consulted, input appreciated. Ensure ordered 10/2/HS.   Malnutrition Characteristics: Weight loss, Inadequate energy, Muscle loss                  360 Statement: Chronic/severe malnutrition r/t decreased PO intake/condition, as evidenced by 9.5% wt loss x 3 months (5/21/24: 152#, 2/27/24: 168#),  intake meeting <75% of estimated needs for > 1 month, and severe muscle mass depletion (temporal region). Treatment: liberal PO diet + nutrition supplements    BMI Findings:           Body mass index is 27.58 kg/m².   Remeron 7.5 tonight for appetite, sleep and mood

## 2024-05-30 NOTE — ASSESSMENT & PLAN NOTE
Lab Results   Component Value Date    EGFR 33 05/30/2024    EGFR 32 05/29/2024    EGFR 32 05/28/2024    CREATININE 1.51 (H) 05/30/2024    CREATININE 1.57 (H) 05/29/2024    CREATININE 1.57 (H) 05/28/2024       POA Cr 1.57. Baseline appears to be around 1. Consider most likely secondary to hypovolemia given that she endorses poor PO intake recently, as well as recent increase in prn lasix use for lower extremity swelling. Consider swelling most likely a medication side effect from Sotorasib.     Plan  Gentle hydration NS  UA: moderate leuks, asymptomatic, will not treat at this time  Urine sodium: 17  Urine creatinine: 144, crt:albumin ratio of 25  Bladder scan/urinary retention protocol  Home lasix, sotalol, valsartan held for now  Promote normotension, avoid nephrotoxins

## 2024-05-31 ENCOUNTER — HOSPITAL ENCOUNTER (OUTPATIENT)
Dept: RADIOLOGY | Age: 74
Discharge: HOME | End: 2024-05-31

## 2024-05-31 PROBLEM — A41.9 SEPSIS (HCC): Status: ACTIVE | Noted: 2024-05-31

## 2024-05-31 PROBLEM — E44.0 MODERATE PROTEIN-CALORIE MALNUTRITION (HCC): Status: RESOLVED | Noted: 2024-05-28 | Resolved: 2024-05-31

## 2024-05-31 LAB
ANION GAP SERPL CALCULATED.3IONS-SCNC: 8 MMOL/L (ref 4–13)
ANISOCYTOSIS BLD QL SMEAR: PRESENT
BASOPHILS # BLD MANUAL: 0 THOUSAND/UL (ref 0–0.1)
BASOPHILS NFR MAR MANUAL: 0 % (ref 0–1)
BUN SERPL-MCNC: 30 MG/DL (ref 5–25)
CALCIUM SERPL-MCNC: 8.9 MG/DL (ref 8.4–10.2)
CHLORIDE SERPL-SCNC: 97 MMOL/L (ref 96–108)
CO2 SERPL-SCNC: 24 MMOL/L (ref 21–32)
CREAT SERPL-MCNC: 1.17 MG/DL (ref 0.6–1.3)
EOSINOPHIL # BLD MANUAL: 0 THOUSAND/UL (ref 0–0.4)
EOSINOPHIL NFR BLD MANUAL: 0 % (ref 0–6)
ERYTHROCYTE [DISTWIDTH] IN BLOOD BY AUTOMATED COUNT: 18.2 % (ref 11.6–15.1)
GFR SERPL CREATININE-BSD FRML MDRD: 45 ML/MIN/1.73SQ M
GLUCOSE SERPL-MCNC: 173 MG/DL (ref 65–140)
GLUCOSE SERPL-MCNC: 174 MG/DL (ref 65–140)
GLUCOSE SERPL-MCNC: 176 MG/DL (ref 65–140)
GLUCOSE SERPL-MCNC: 95 MG/DL (ref 65–140)
HCT VFR BLD AUTO: 30.7 % (ref 34.8–46.1)
HGB BLD-MCNC: 8.8 G/DL (ref 11.5–15.4)
LYMPHOCYTES # BLD AUTO: 0.87 THOUSAND/UL (ref 0.6–4.47)
LYMPHOCYTES # BLD AUTO: 6 % (ref 14–44)
MAGNESIUM SERPL-MCNC: 2.2 MG/DL (ref 1.9–2.7)
MCH RBC QN AUTO: 22.1 PG (ref 26.8–34.3)
MCHC RBC AUTO-ENTMCNC: 28.7 G/DL (ref 31.4–37.4)
MCV RBC AUTO: 77 FL (ref 82–98)
MONOCYTES # BLD AUTO: 0.87 THOUSAND/UL (ref 0–1.22)
MONOCYTES NFR BLD: 6 % (ref 4–12)
NEUTROPHILS # BLD MANUAL: 12.77 THOUSAND/UL (ref 1.85–7.62)
NEUTS BAND NFR BLD MANUAL: 1 % (ref 0–8)
NEUTS SEG NFR BLD AUTO: 87 % (ref 43–75)
PLATELET # BLD AUTO: 376 THOUSANDS/UL (ref 149–390)
PLATELET BLD QL SMEAR: ADEQUATE
PMV BLD AUTO: 10.4 FL (ref 8.9–12.7)
POIKILOCYTOSIS BLD QL SMEAR: PRESENT
POTASSIUM SERPL-SCNC: 3.6 MMOL/L (ref 3.5–5.3)
RBC # BLD AUTO: 3.98 MILLION/UL (ref 3.81–5.12)
RBC MORPH BLD: PRESENT
SODIUM SERPL-SCNC: 129 MMOL/L (ref 135–147)
WBC # BLD AUTO: 14.51 THOUSAND/UL (ref 4.31–10.16)

## 2024-05-31 PROCEDURE — 85007 BL SMEAR W/DIFF WBC COUNT: CPT

## 2024-05-31 PROCEDURE — 85027 COMPLETE CBC AUTOMATED: CPT

## 2024-05-31 PROCEDURE — 82948 REAGENT STRIP/BLOOD GLUCOSE: CPT

## 2024-05-31 PROCEDURE — 83735 ASSAY OF MAGNESIUM: CPT

## 2024-05-31 PROCEDURE — 80048 BASIC METABOLIC PNL TOTAL CA: CPT

## 2024-05-31 PROCEDURE — 99233 SBSQ HOSP IP/OBS HIGH 50: CPT | Performed by: STUDENT IN AN ORGANIZED HEALTH CARE EDUCATION/TRAINING PROGRAM

## 2024-05-31 RX ORDER — FUROSEMIDE 20 MG/1
20 TABLET ORAL DAILY
Status: DISCONTINUED | OUTPATIENT
Start: 2024-05-31 | End: 2024-06-01

## 2024-05-31 RX ADMIN — FUROSEMIDE 20 MG: 20 TABLET ORAL at 15:34

## 2024-05-31 RX ADMIN — INSULIN LISPRO 1 UNITS: 100 INJECTION, SOLUTION INTRAVENOUS; SUBCUTANEOUS at 17:41

## 2024-05-31 RX ADMIN — Medication 3 MG: at 20:52

## 2024-05-31 RX ADMIN — METOPROLOL TARTRATE 25 MG: 25 TABLET, FILM COATED ORAL at 20:51

## 2024-05-31 RX ADMIN — CEFTRIAXONE SODIUM 1000 MG: 10 INJECTION, POWDER, FOR SOLUTION INTRAVENOUS at 14:24

## 2024-05-31 RX ADMIN — ACETAMINOPHEN 975 MG: 325 TABLET, FILM COATED ORAL at 15:40

## 2024-05-31 RX ADMIN — INSULIN LISPRO 1 UNITS: 100 INJECTION, SOLUTION INTRAVENOUS; SUBCUTANEOUS at 08:05

## 2024-05-31 RX ADMIN — APIXABAN 5 MG: 5 TABLET, FILM COATED ORAL at 17:40

## 2024-05-31 RX ADMIN — BUSPIRONE HYDROCHLORIDE 7.5 MG: 5 TABLET ORAL at 20:52

## 2024-05-31 RX ADMIN — PANTOPRAZOLE SODIUM 40 MG: 40 TABLET, DELAYED RELEASE ORAL at 05:17

## 2024-05-31 RX ADMIN — GABAPENTIN 200 MG: 100 CAPSULE ORAL at 17:40

## 2024-05-31 NOTE — ASSESSMENT & PLAN NOTE
Lab Results   Component Value Date    EGFR 45 05/31/2024    EGFR 33 05/30/2024    EGFR 32 05/29/2024    CREATININE 1.17 05/31/2024    CREATININE 1.51 (H) 05/30/2024    CREATININE 1.57 (H) 05/29/2024       POA Cr 1.57. Baseline appears to be around 1. Consider most likely secondary to hypovolemia given that she endorses poor PO intake recently, as well as recent increase in prn lasix use for lower extremity swelling. Consider swelling most likely a medication side effect from Sotorasib.     Plan  S/p Gentle hydration NS  UA: moderate leuks, asymptomatic, will not treat at this time  Urine sodium: 17  Urine creatinine: 144, crt:albumin ratio of 25  Bladder scan/urinary retention protocol  Home lasix, valsartan held for now  Promote normotension, avoid nephrotoxins

## 2024-05-31 NOTE — ASSESSMENT & PLAN NOTE
Lab Results   Component Value Date    EGFR 45 05/31/2024    EGFR 33 05/30/2024    EGFR 32 05/29/2024    CREATININE 1.17 05/31/2024    CREATININE 1.51 (H) 05/30/2024    CREATININE 1.57 (H) 05/29/2024       POA Cr 1.57. Baseline appears to be around 1. Consider most likely secondary to hypovolemia given that she endorses poor PO intake recently, as well as recent increase in prn lasix use for lower extremity swelling. Consider swelling most likely a medication side effect from Sotorasib.     Plan  S/p Gentle hydration NS  UA: moderate leuks, asymptomatic, will not treat at this time  Urine sodium: 17  Urine creatinine: 144, crt:albumin ratio of 25  Bladder scan/urinary retention protocol  Restarted home sotolol, on metoprolol. Hold home lasix  Home ARB held at this time, restart as BP permits  Per family, patient on valsartan 160 mg DAILY, not bid as listed in med rec  Promote normotension, avoid nephrotoxins

## 2024-05-31 NOTE — ASSESSMENT & PLAN NOTE
Recent increased weakness with several recent falls. She endorses significant weight loss and poor appetite over the past 2 months. Lives at home, occasionally uses cane or walker but generally does not. Orthostatic vitals negative.    Plan  PT/OT - recommended inpatient rehab, patient amenable to going  Fall precautions  Increased risk due to sedation 2/2 gabapentin and remeron  D/c remeron for now  Consider restarting when renal function is back to baseline and sedation has decreased  Nutrition consult

## 2024-05-31 NOTE — ASSESSMENT & PLAN NOTE
Wt Readings from Last 3 Encounters:   05/31/24 69 kg (152 lb 1.9 oz)   05/21/24 69.3 kg (152 lb 12.8 oz)   05/20/24 71.4 kg (157 lb 8 oz)     Last Echo in 2022 showed EF 60, grade 1 diastolic dysfunction.   Lungs clear on exam, no SOB. 3+ lower extremitiy edema on exam, consider likely medication side effect from Sotorasib.   Home Lasix recently increased to 40 mg daily prn for swelling likely causing ADITYA    Plan  S/p Gentle hydration for ADITYA  D/c home lasix

## 2024-05-31 NOTE — ASSESSMENT & PLAN NOTE
Recent Labs     05/29/24  0442 05/30/24  0534 05/31/24  0429   SODIUM 131* 131* 129*         Likely 2/2 poor PO intake. Checked Urine sodium, appropriately low. Encourage PO intake and continue to monitor.

## 2024-05-31 NOTE — ASSESSMENT & PLAN NOTE
Lab Results   Component Value Date    HGBA1C 7.2 (H) 05/08/2024       Recent Labs     05/30/24  1529 05/30/24  2042 05/31/24  0724 05/31/24  1138   POCGLU 190* 187* 174* 95         Blood Sugar Average: Last 72 hrs:  (P) 167    Hold home medications. SSI algorithm 1, titrate as needed.

## 2024-05-31 NOTE — PROGRESS NOTES
[facility]  Progress Note  Name: Melany Griffith I  MRN: [id number]  Unit/Bed#: S -01 I Date of Admission: 5/28/2024   Date of Service: 5/31/2024 I Hospital Day: 2    Assessment & Plan   * ADITYA on CKD  Assessment & Plan  Lab Results   Component Value Date    EGFR 45 05/31/2024    EGFR 33 05/30/2024    EGFR 32 05/29/2024    CREATININE 1.17 05/31/2024    CREATININE 1.51 (H) 05/30/2024    CREATININE 1.57 (H) 05/29/2024       POA Cr 1.57. Baseline appears to be around 1. Consider most likely secondary to hypovolemia given that she endorses poor PO intake recently, as well as recent increase in prn lasix use for lower extremity swelling. Consider swelling most likely a medication side effect from Sotorasib.     Plan  S/p Gentle hydration NS  UA: moderate leuks, asymptomatic, will not treat at this time  Urine sodium: 17  Urine creatinine: 144, crt:albumin ratio of 25  Bladder scan/urinary retention protocol  Home lasix, valsartan held for now  Promote normotension, avoid nephrotoxins  Trend creatinine 1800 and on AM labs    Fall  Assessment & Plan  S/p mechanical fall with no LOC. On eliquis. Sustained hit to back of the head and R shoulder. Trauma workup negative. Not currently in any pain.     Plan  Tylenol prn for pain control  PT/OT recommending PAR, which patient agrees is a good idea  Fall precautions    Adenocarcinoma of lung, stage 4 (HCC)  Assessment & Plan  S/p chemo, radiation, and immunotherapy. Follows w/ Dr. Sam outpatient. Currently on Sotorasib only.     Plan  Will provide sotorasib from home    Sacral wound  Assessment & Plan  Small wound present in the R sacral/buttock area. Small amount of scabbing present, no current discharge. Low suspicion of infection.     Plan  Wound care consult  No need for abx at this time    Acute pain of right shoulder  Assessment & Plan  S/p fall. Trauma XR's all negative. Tylenol 975 prn.     Ambulatory dysfunction  Assessment &  Plan  Recent increased weakness with several recent falls. She endorses significant weight loss and poor appetite over the past 2 months. Lives at home, occasionally uses cane or walker but generally does not. Orthostatic vitals negative.    Plan  PT/OT - recommended inpatient rehab, patient amenable to going  Fall precautions  Nutrition consult      Type 2 diabetes mellitus with stage 3 chronic kidney disease, without long-term current use of insulin, unspecified whether stage 3a or 3b CKD ([facility])  Assessment & Plan  Lab Results   Component Value Date    HGBA1C 7.2 (H) 05/08/2024       Recent Labs     05/30/24  1117 05/30/24  1529 05/30/24  2042 05/31/24  0724   POCGLU 235* 190* 187* 174*       Blood Sugar Average: Last 72 hrs:  (P) 174.2    Hold home medications. SSI algorithm 1, titrate as needed.     Hyponatremia  Assessment & Plan  Recent Labs     05/29/24  0442 05/30/24  0534 05/31/24  0429   SODIUM 131* 131* 129*         Likely 2/2 poor PO intake combined with SIADH from cancer and effexor. Checked Urine sodium, appropriately low. Encourage PO intake and continue to monitor. D/c fluids with improved renal function.    Constipation  Assessment & Plan  She notes ongoing issues with constipation at home. Miralax and Senna both ordered daily. Increase/reduce as needed.     Lower extremity edema  Assessment & Plan  Chronic but worsening recently. She does have a history of grade 1 diastolic dysfunction, but denies any recent SOB. Consider most likely secondary to Sotorasib use as this has been a reported side effect and pt does not appear to be in CHF exacerbation.     Plan  Apply compression stockings  B/L lower extremity doppler wnl, no clots  Continue Sotorasib for now    Chronic diastolic heart failure (HCC)  Assessment & Plan  Wt Readings from Last 3 Encounters:   05/31/24 69 kg (152 lb 1.9 oz)   05/21/24 69.3 kg (152 lb 12.8 oz)   05/20/24 71.4 kg (157 lb 8 oz)     Last Echo in 2022 showed EF 60, grade  1 diastolic dysfunction.   Lungs clear on exam, no SOB. 3+ lower extremitiy edema on exam, consider likely medication side effect from Sotorasib.   Home Lasix recently increased to 40 mg daily prn for swelling likely causing ADITYA    Plan  S/p Gentle hydration for ADITYA  Restart home lasix as volume/blood pressure necessitates    Paroxysmal atrial fibrillation (HCC)  Assessment & Plan  Continue home eliquis, metoprolol  Home sotalol held until renal function improves    Severe protein-calorie malnutrition (HCC)  Assessment & Plan  Malnutrition Findings:   Adult Malnutrition type: Chronic illness  Adult Degree of Malnutrition: Other severe protein calorie malnutrition  Malnutrition Characteristics: Weight loss, Inadequate energy, Muscle loss                  360 Statement: Chronic/severe malnutrition r/t decreased PO intake/condition, as evidenced by 9.5% wt loss x 3 months (5/21/24: 152#, 2/27/24: 168#),  intake meeting <75% of estimated needs for > 1 month, and severe muscle mass depletion (temporal region). Treatment: liberal PO diet + nutrition supplements    BMI Findings:           Body mass index is 27.82 kg/m².   See above  Remeron 7.5 tonight for appetite, sleep and mood  Consider giving earlier in night to limit daytime sleepiness  Consider d/c'ing medication in light of persistent fatigue     Urinary Tract Infection  Sepsis  Patient meeting SIRS with source.   Treat with ceftriaxone due to SIRS criteria and urinalysis suspicious for infection. Patient not reliable historian.     VTE Pharmacologic Prophylaxis: VTE Score: 7 High Risk (Score >/= 5) - Pharmacological DVT Prophylaxis Ordered: apixaban (Eliquis). Sequential Compression Devices Ordered.    Mobility:   Basic Mobility Inpatient Raw Score: 12  JH-HLM Goal: 4: Move to chair/commode  JH-HLM Achieved: 4: Move to chair/commode  JH-HLM Goal achieved. Continue to encourage appropriate mobility.    Patient Centered Rounds: I performed bedside rounds with  nursing staff today.  Discussions with Specialists or Other Care Team Provider: attending physician    Education and Discussions with Family / Patient: Will call , daughter after rounds    Current Length of Stay: 2 day(s)  Current Patient Status: Inpatient   Discharge Plan: Anticipate discharge in 24-48 hrs to rehab facility.    Code Status: Level 3 - DNAR and DNI    Subjective:   Melany was evaluated at bedside this morning. She is sleeping comfortably in chair. She says that she is still tired and denies being in pain. She is arousable to touch.     Objective:   Vitals:   Temp (24hrs), Av.9 °F (36.6 °C), Min:97.5 °F (36.4 °C), Max:98.5 °F (36.9 °C)    Temp:  [97.5 °F (36.4 °C)-98.5 °F (36.9 °C)] 97.5 °F (36.4 °C)  HR:  [80-90] 86  Resp:  [16-17] 17  BP: (116-142)/(64-86) 121/76  SpO2:  [95 %-100 %] 100 %  Body mass index is 27.82 kg/m².     Input and Output Summary (last 24 hours):     Intake/Output Summary (Last 24 hours) at 2024 1144  Last data filed at 2024 0601  Gross per 24 hour   Intake 360 ml   Output 240 ml   Net 120 ml       Physical Exam:   Physical Exam  Vitals and nursing note reviewed.   Constitutional:       General: She is not in acute distress.     Appearance: Normal appearance.      Comments: asleep   HENT:      Head: Normocephalic and atraumatic.      Nose: Nose normal.      Mouth/Throat:      Mouth: Mucous membranes are moist.   Cardiovascular:      Rate and Rhythm: Normal rate and regular rhythm.      Pulses: Normal pulses.      Heart sounds: Normal heart sounds. No murmur heard.  Pulmonary:      Effort: Pulmonary effort is normal. No respiratory distress.      Breath sounds: Normal breath sounds. No wheezing or rales.   Abdominal:      General: There is no distension.      Palpations: Abdomen is soft.      Tenderness: There is no abdominal tenderness.   Musculoskeletal:      Right lower leg: Edema present.      Left lower leg: Edema present.   Skin:     General:  Skin is warm and dry.      Capillary Refill: Capillary refill takes less than 2 seconds.   Neurological:      Comments: asleep          Additional Data:   Labs:  Results from last 7 days   Lab Units 05/31/24  0429 05/30/24  0534 05/29/24  0442   WBC Thousand/uL 14.51*   < > 13.95*   HEMOGLOBIN g/dL 8.8*   < > 10.1*   HEMATOCRIT % 30.7*   < > 34.8   PLATELETS Thousands/uL 376   < > 516*   BANDS PCT % 1  --   --    SEGS PCT %  --   --  88*   LYMPHO PCT % 6*  --  6*   MONO PCT % 6  --  5   EOS PCT % 0  --  0    < > = values in this interval not displayed.     Results from last 7 days   Lab Units 05/31/24  0429 05/28/24  1605 05/28/24  1602   SODIUM mmol/L 129*   < > 130*   POTASSIUM mmol/L 3.6   < > 4.8   CHLORIDE mmol/L 97   < > 95*   CO2 mmol/L 24   < > 23   CO2, I-STAT   --    < >  --    BUN mg/dL 30*   < > 37*   CREATININE mg/dL 1.17   < > 1.57*   ANION GAP mmol/L 8   < > 12   CALCIUM mg/dL 8.9   < > 9.2   ALBUMIN g/dL  --   --  3.2*   TOTAL BILIRUBIN mg/dL  --   --  0.57   ALK PHOS U/L  --   --  95   ALT U/L  --   --  11   AST U/L  --   --  13   GLUCOSE RANDOM mg/dL 173*   < > 118    < > = values in this interval not displayed.         Results from last 7 days   Lab Units 05/31/24  1138 05/31/24  0724 05/30/24  2042 05/30/24  1529 05/30/24  1117 05/30/24  0745 05/29/24  2130 05/29/24  1626 05/29/24  1241 05/29/24  0738 05/28/24  2109   POC GLUCOSE mg/dl 95 174* 187* 190* 235* 169* 205* 131 147* 156* 148*               Lines/Drains:  Invasive Devices       Central Venous Catheter Line  Duration             Port A Cath 12/14/21 Right Chest 899 days              Peripheral Intravenous Line  Duration             Peripheral IV 05/29/24 Left Antecubital 1 day                    Central Line:  Goal for removal: N/A - Chronic PICC             Imaging: No pertinent imaging reviewed.    Recent Cultures (last 7 days):         Last 24 Hours Medication List:   Current Facility-Administered Medications   Medication Dose Route  Frequency Provider Last Rate    acetaminophen  975 mg Oral Q8H PRN Rito Sheridan, DO      apixaban  5 mg Oral BID Rito Sheridan, DO      aspirin  81 mg Oral Daily Rito Sheridan, DO      atorvastatin  40 mg Oral Daily Rito Sheridan, DO      busPIRone  7.5 mg Oral HS Rito Sheridan, DO      cyanocobalamin  100 mcg Oral Daily Rito Loyjeremi, DO      folic acid  1 mg Oral Daily Rito Loyjeremi, DO      gabapentin  200 mg Oral BID Rito Sheridan, DO      insulin lispro  1-5 Units Subcutaneous TID AC Rito Sheridan, DO      insulin lispro  1-5 Units Subcutaneous HS Rito Sheridan, DO      melatonin  3 mg Oral HS Marta Lazo MD      metoprolol tartrate  25 mg Oral Q12H Rito Sheridan, DO      mirtazapine  7.5 mg Oral HS Emmanuelle Velasquez MD      pantoprazole  40 mg Oral Early Morning Rito Sheridan, DO      polyethylene glycol  17 g Oral Daily Rito Sheridan, DO      prochlorperazine  10 mg Oral TID PRN Rito Sheridan, DO      senna  1 tablet Oral Daily Rito Sheridan, DO      Sotorasib  960 mg Oral Daily Rito Sheridan, DO      venlafaxine  100 mg Oral HS Rito Sheridan, DO          Today, Patient Was Seen By: Emmanuelle Velasquez MD    **Please Note: This note may have been constructed using a voice recognition system.**

## 2024-05-31 NOTE — ASSESSMENT & PLAN NOTE
Lab Results   Component Value Date    HGBA1C 7.2 (H) 05/08/2024       Recent Labs     05/30/24  1117 05/30/24  1529 05/30/24 2042 05/31/24  0724   POCGLU 235* 190* 187* 174*       Blood Sugar Average: Last 72 hrs:  (P) 174.2    Hold home medications. SSI algorithm 1, titrate as needed.

## 2024-05-31 NOTE — ASSESSMENT & PLAN NOTE
Wt Readings from Last 3 Encounters:   05/31/24 69 kg (152 lb 1.9 oz)   05/21/24 69.3 kg (152 lb 12.8 oz)   05/20/24 71.4 kg (157 lb 8 oz)     Last Echo in 2022 showed EF 60, grade 1 diastolic dysfunction.   Lungs clear on exam, no SOB. 3+ lower extremitiy edema on exam, consider likely medication side effect from Sotorasib.   Home Lasix recently increased to 40 mg daily prn for swelling likely causing ADITYA    Plan  S/p Gentle hydration for ADITYA  Restart home lasix as volume/blood pressure necessitates

## 2024-05-31 NOTE — CASE MANAGEMENT
Case Management Discharge Planning Note    Patient name Melany Griffith  Location S /S -01 MRN [id number]  : 1950 Date 2024       Current Admission Date: 2024  Current Admission Diagnosis:ADITYA on CKD   Patient Active Problem List    Diagnosis Date Noted Date Diagnosed    Severe protein-calorie malnutrition (HCC) 2024     Fall 2024     Ambulatory dysfunction 2024     Acute pain of right shoulder 2024     Sacral wound 2024     Nausea 2024     Shoulder pain 05/10/2024     Microcytic anemia 05/10/2024     Neck muscle spasm 2024     Port-A-Cath in place 2024     Gait abnormality 2023     Bowel trouble 2023     Joint pain 2023     Constipation 2023     ADITYA on CKD 2022     Anemia due to antineoplastic chemotherapy 2022     Acquired trigger finger of right ring finger 2022     Cognitive decline 2022     Lower extremity edema 2022     GERD (gastroesophageal reflux disease) 2022     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HCC) 2022     History of stroke 2022     Hypomagnesemia 2022     Lung cancer (HCC) 2022     Former smoker 2021     Diarrhea 2021     Chemotherapy induced neutropenia (HCC) 2021     Hypercalcemia 2020     Palliative care patient 10/21/2020     Anemia in stage 3 chronic kidney disease  (HCC) 2020     Pneumonitis 2020     CINV (chemotherapy-induced nausea and vomiting) 2020     Cyst of pancreas 2020     Neoplastic malignant related fatigue 10/08/2019     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) 10/02/2019     Medicare annual wellness visit, subsequent 2018     Anxiety 2018     Adhesive capsulitis of shoulder 2018     Current episode of major depressive disorder without prior episode 2017     Malignant neoplasm of bone with metastases (HCC) 2017     Type 2  diabetes mellitus with stage 3 chronic kidney disease, without long-term current use of insulin, unspecified whether stage 3a or 3b CKD (HCC)      Cancer related pain 11/22/2016     Chronic diastolic heart failure (HCC) 11/20/2016     Adenocarcinoma of lung, stage 4 (HCC) 11/16/2016     Hyponatremia 11/16/2016     Paroxysmal atrial fibrillation (HCC) 08/27/2016     Primary hypertension 08/27/2016     Vitamin D deficiency 08/03/2015     Dyslipidemia 08/03/2015     Acid reflux 06/04/2015       LOS (days): 2  Geometric Mean LOS (GMLOS) (days): 3.1  Days to GMLOS:1.2     OBJECTIVE:  Risk of Unplanned Readmission Score: 31.58         Current admission status: Inpatient   Preferred Pharmacy:   [id number] [place] - [address]  [address] [place] [address]  Phone: [phone] Fax: [phone]    [facility] - [address] [address]  Phone: [phone] Fax: [phone]    Primary Care Provider: aSlina Dwyer DO    Primary Insurance: MEDICARE  Secondary Insurance: [employer]    DISCHARGE DETAILS:    Discharge planning discussed with:: patient's dtr Liz  Freedom of Choice: Yes  Comments - Freedom of Choice: CM received call from pt's dtr Liz re: STR options available for pt. CM notified dtr of pt's first choice for [facility] not currently available but both [facility] and [facility] are available from additional preferred choices. Alternate facilities also available from STR blanket referral sent. Dtr inquired into [facility] as possible option - CM confirmed [facility] able to offer bed. Dtr relayed will discuss the three top choices with her brother and f/u with this CM of pt STR choice.  CM contacted family/caregiver?: Yes  Were Treatment Team discharge recommendations reviewed with patient/caregiver?: Yes  Did patient/caregiver verbalize understanding of patient care needs?: N/A- going to  facility  Were patient/caregiver advised of the risks associated with not following Treatment Team discharge recommendations?: Yes    Contacts  Patient Contacts: Liz Booth (Daughter)  Relationship to Patient:: Family  Contact Method: Phone  Phone Number: [phone]  Reason/Outcome: Discharge Planning

## 2024-05-31 NOTE — CASE MANAGEMENT
Case Management Discharge Planning Note    Patient name Melany Griffith  Location S /S -01 MRN [id number]  : 1950 Date 2024       Current Admission Date: 2024  Current Admission Diagnosis:ADITYA on CKD   Patient Active Problem List    Diagnosis Date Noted Date Diagnosed    Sepsis (HCC) 2024     Severe protein-calorie malnutrition (HCC) 2024     Fall 2024     Ambulatory dysfunction 2024     Acute pain of right shoulder 2024     Sacral wound 2024     Nausea 2024     Shoulder pain 05/10/2024     Microcytic anemia 05/10/2024     Neck muscle spasm 2024     Port-A-Cath in place 2024     Gait abnormality 2023     Bowel trouble 2023     Joint pain 2023     Constipation 2023     ADITYA on CKD 2022     Anemia due to antineoplastic chemotherapy 2022     Acquired trigger finger of right ring finger 2022     Cognitive decline 2022     Lower extremity edema 2022     GERD (gastroesophageal reflux disease) 2022     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HCC) 2022     History of stroke 2022     Hypomagnesemia 2022     Lung cancer (HCC) 2022     Former smoker 2021     Diarrhea 2021     Chemotherapy induced neutropenia ([facility]) 2021     Hypercalcemia 2020     Palliative care patient 10/21/2020     Anemia in stage 3 chronic kidney disease  (HCC) 2020     Pneumonitis 2020     CINV (chemotherapy-induced nausea and vomiting) 2020     Cyst of pancreas 2020     Neoplastic malignant related fatigue 10/08/2019     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) 10/02/2019     Medicare annual wellness visit, subsequent 2018     Anxiety 2018     Adhesive capsulitis of shoulder 2018     Current episode of major depressive disorder without prior episode 2017     Malignant neoplasm of bone with metastases  (HCC) 05/24/2017     Type 2 diabetes mellitus with stage 3 chronic kidney disease, without long-term current use of insulin, unspecified whether stage 3a or 3b CKD (HCC)      Cancer related pain 11/22/2016     Chronic diastolic heart failure (HCC) 11/20/2016     Adenocarcinoma of lung, stage 4 (HCC) 11/16/2016     Hyponatremia 11/16/2016     Paroxysmal atrial fibrillation (HCC) 08/27/2016     Primary hypertension 08/27/2016     Vitamin D deficiency 08/03/2015     Dyslipidemia 08/03/2015     Acid reflux 06/04/2015       LOS (days): 2  Geometric Mean LOS (GMLOS) (days): 4.4  Days to GMLOS:2.2     OBJECTIVE:  Risk of Unplanned Readmission Score: 30.9         Current admission status: Inpatient   Preferred Pharmacy:   [id number]  [place] - [address]  [address] [place] [address]  Phone: [phone] Fax: [phone]    [facility] - [address] [address]  Phone: [phone] Fax: [phone]    Primary Care Provider: Salina Dwyer DO    Primary Insurance: MEDICARE  Secondary Insurance: [facility]    DISCHARGE DETAILS:    Discharge planning discussed with:: patient's dtr Liz  Freedom of Choice: Yes  Comments - Freedom of Choice: CM received email from pt's dtr re: STR choice. 1. [facility], 2. [facility], and 3. [facility]. CM f/u w/ dtr's first choice for [facility] - confirmed bed remains available for pt and reserved in [facility]in. Facility requested family to bring in pt's sotorasib in the original packaging as unable to provide at rehab. CM sent return email to dtr - notifying of [facility] confirmed for STR and requesting sotorasib medication. CM to continue to follow for coordination of pt transport to STR when ready for d/c. CM to contact [facility] unit directly (ph:[phone]) with transport time if pt is weekend d/c.   CM contacted family/caregiver?: Yes  Were Treatment Team discharge recommendations reviewed with  patient/caregiver?: Yes  Did patient/caregiver verbalize understanding of patient care needs?: N/A- going to facility  Were patient/caregiver advised of the risks associated with not following Treatment Team discharge recommendations?: Yes    Contacts  Patient Contacts: Liz Booth (Daughter)  Relationship to Patient:: Family  Contact Method: Phone  Phone Number: [phone]  Reason/Outcome: Discharge Planning    Requested Home Health Care         Is the patient interested in HHC at discharge?: No    DME Referral Provided  Referral made for DME?: No    Other Referral/Resources/Interventions Provided:  Interventions: Short Term Rehab  Referral Comments: [facility] reserved in aidin.         Treatment Team Recommendation: Short Term Rehab  Discharge Destination Plan:: Short Term Rehab  Transport at Discharge : Wheelchair van                                           Accepting Facility Name, City & State : [facility]  Receiving Facility/Agency Phone Number: [phone]  Facility/Agency Fax Number: [phone]

## 2024-05-31 NOTE — ASSESSMENT & PLAN NOTE
Malnutrition Findings:   Adult Malnutrition type: Chronic illness  Adult Degree of Malnutrition: Other severe protein calorie malnutrition  Malnutrition Characteristics: Weight loss, Inadequate energy, Muscle loss                  360 Statement: Chronic/severe malnutrition r/t decreased PO intake/condition, as evidenced by 9.5% wt loss x 3 months (5/21/24: 152#, 2/27/24: 168#),  intake meeting <75% of estimated needs for > 1 month, and severe muscle mass depletion (temporal region). Treatment: liberal PO diet + nutrition supplements    BMI Findings:           Body mass index is 27.82 kg/m².   Remeron 7.5 tonight for appetite, sleep and mood  D/c remeron given sedation. Consider restarting outpatient once renal function stable

## 2024-05-31 NOTE — ASSESSMENT & PLAN NOTE
Meeting SIRS with HR 90s and WBC >12. Suspect urinary source given UA on admission. Patient unreliable and unable to give symptomatic history.   Treat UTI with ceftriaxone, plan for 5d course of antibiotics, may consider transition to PO on discharge  S/p IV hydration, blood pressures supported without further fluids  Trend WBC daily and vitals per unit routine

## 2024-05-31 NOTE — ASSESSMENT & PLAN NOTE
Recent Labs     05/30/24  0534 05/31/24  0429 06/01/24  0610   SODIUM 131* 129* 127*       Likely 2/2 poor PO intake. Checked Urine sodium, appropriately low. Encourage PO intake and continue to monitor. Also probably has a component of SIADH 2/2 lung cancer/Effexor.   Will consult nephrology given worsening  D/c lasix for now

## 2024-05-31 NOTE — ASSESSMENT & PLAN NOTE
Malnutrition Findings:   Adult Malnutrition type: Chronic illness  Adult Degree of Malnutrition: Other severe protein calorie malnutritionShe endorsed poor appetite and significant weight loss over the past 2 months, not completely sure how much. Nutrition consulted, input appreciated. Ensure ordered 10/2/HS.   Malnutrition Characteristics: Weight loss, Inadequate energy, Muscle loss                  360 Statement: Chronic/severe malnutrition r/t decreased PO intake/condition, as evidenced by 9.5% wt loss x 3 months (5/21/24: 152#, 2/27/24: 168#),  intake meeting <75% of estimated needs for > 1 month, and severe muscle mass depletion (temporal region). Treatment: liberal PO diet + nutrition supplements    BMI Findings:           Body mass index is 27.82 kg/m².   Remeron 7.5 tonight for appetite, sleep and mood  Consider giving earlier in night to limit daytime sleepiness

## 2024-05-31 NOTE — PROCEDURES
"Venous Access Line Insertion    Date/Time: 5/31/2024 2:13 PM    Performed by: Kacey Willis RN  Authorized by: Marcos Benson MD    Patient location:  Bedside  [place] protocol:     Procedure explained and questions answered to patient or proxy's satisfaction: yes    Pre-procedure details:     Hand hygiene: Hand hygiene performed prior to insertion      Sterile barrier technique: All elements of maximal sterile technique followed      Skin preparation:  ChloraPrep  Procedure details:     Complex Venous Access Line Type: US Guided Peripheral IV      Orientation:  Left and upper    Location:  Arm    Catheter size:  20 gauge (1.88\")    Patient evaluated for contraindications to access (i.e. fistula, thrombosis, etc): Yes      Approach: percutaneous technique used      Sterile ultrasound techniques: Sterile gel and sterile probe covers were used      Number of attempts:  1    Successful placement: yes    Anesthesia (see MAR for exact dosages):     Anesthesia method:  None  Post-procedure details:     Post-procedure:  Dressing applied    Assessment:  Blood return through all ports    Post-procedure complications: none      Patient tolerance of procedure:  Tolerated well, no immediate complications          "

## 2024-05-31 NOTE — ASSESSMENT & PLAN NOTE
Malnutrition Findings:   Adult Malnutrition type: Chronic illness  Adult Degree of Malnutrition: Other severe protein calorie malnutrition  Malnutrition Characteristics: Weight loss, Inadequate energy, Muscle loss                  360 Statement: Chronic/severe malnutrition r/t decreased PO intake/condition, as evidenced by 9.5% wt loss x 3 months (5/21/24: 152#, 2/27/24: 168#),  intake meeting <75% of estimated needs for > 1 month, and severe muscle mass depletion (temporal region). Treatment: liberal PO diet + nutrition supplements    BMI Findings:           Body mass index is 27.82 kg/m².   See above

## 2024-05-31 NOTE — PLAN OF CARE
Problem: Potential for Falls  Goal: Patient will remain free of falls  Description: INTERVENTIONS:  - Educate patient/family on patient safety including physical limitations  - Instruct patient to call for assistance with activity   - Consult OT/PT to assist with strengthening/mobility   - Keep Call bell within reach  - Keep bed low and locked with side rails adjusted as appropriate  - Keep care items and personal belongings within reach  - Initiate and maintain comfort rounds  - Make Fall Risk Sign visible to staff  - Offer Toileting every 2 Hours, in advance of need  - Initiate/Maintain alarm  - Obtain necessary fall risk management equipment:   - Apply yellow socks and bracelet for high fall risk patients  - Consider moving patient to room near nurses station  Outcome: Progressing     Problem: Nutrition/Hydration-ADULT  Goal: Nutrient/Hydration intake appropriate for improving, restoring or maintaining nutritional needs  Description: Monitor and assess patient's nutrition/hydration status for malnutrition. Collaborate with interdisciplinary team and initiate plan and interventions as ordered.  Monitor patient's weight and dietary intake as ordered or per policy. Utilize nutrition screening tool and intervene as necessary. Determine patient's food preferences and provide high-protein, high-caloric foods as appropriate.     INTERVENTIONS:  - Monitor oral intake, urinary output, labs, and treatment plans  - Assess nutrition and hydration status and recommend course of action  - Evaluate amount of meals eaten  - Assist patient with eating if necessary   - Allow adequate time for meals  - Recommend/ encourage appropriate diets, oral nutritional supplements, and vitamin/mineral supplements  - Order, calculate, and assess calorie counts as needed  - Recommend, monitor, and adjust tube feedings and TPN/PPN based on assessed needs  - Assess need for intravenous fluids  - Provide specific nutrition/hydration education as  appropriate  - Include patient/family/caregiver in decisions related to nutrition  Outcome: Progressing     Problem: Prexisting or High Potential for Compromised Skin Integrity  Goal: Skin integrity is maintained or improved  Description: INTERVENTIONS:  - Identify patients at risk for skin breakdown  - Assess and monitor skin integrity  - Assess and monitor nutrition and hydration status  - Monitor labs   - Assess for incontinence   - Turn and reposition patient  - Assist with mobility/ambulation  - Relieve pressure over bony prominences  - Avoid friction and shearing  - Provide appropriate hygiene as needed including keeping skin clean and dry  - Evaluate need for skin moisturizer/barrier cream  - Collaborate with interdisciplinary team   - Patient/family teaching  - Consider wound care consult   Outcome: Progressing

## 2024-05-31 NOTE — ASSESSMENT & PLAN NOTE
Chronic but worsening recently. She does have a history of grade 1 diastolic dysfunction, but denies any recent SOB. Consider most likely secondary to Sotorasib use as this has been a reported side effect and pt does not appear to be in CHF exacerbation.     Plan  Apply compression stockings  B/L lower extremity doppler wnl, no clots  Continue Sotorasib for now  D/c home dose lasix due to worsening hyponatremia

## 2024-06-01 LAB
ANION GAP SERPL CALCULATED.3IONS-SCNC: 13 MMOL/L (ref 4–13)
ANION GAP SERPL CALCULATED.3IONS-SCNC: 15 MMOL/L (ref 4–13)
ANION GAP SERPL CALCULATED.3IONS-SCNC: 9 MMOL/L (ref 4–13)
BASOPHILS # BLD AUTO: 0.05 THOUSANDS/ÂΜL (ref 0–0.1)
BASOPHILS NFR BLD AUTO: 0 % (ref 0–1)
BUN SERPL-MCNC: 25 MG/DL (ref 5–25)
BUN SERPL-MCNC: 25 MG/DL (ref 5–25)
BUN SERPL-MCNC: 26 MG/DL (ref 5–25)
CALCIUM SERPL-MCNC: 8.7 MG/DL (ref 8.4–10.2)
CALCIUM SERPL-MCNC: 9 MG/DL (ref 8.4–10.2)
CALCIUM SERPL-MCNC: 9.3 MG/DL (ref 8.4–10.2)
CHLORIDE SERPL-SCNC: 96 MMOL/L (ref 96–108)
CO2 SERPL-SCNC: 16 MMOL/L (ref 21–32)
CO2 SERPL-SCNC: 18 MMOL/L (ref 21–32)
CO2 SERPL-SCNC: 24 MMOL/L (ref 21–32)
CREAT SERPL-MCNC: 1.09 MG/DL (ref 0.6–1.3)
CREAT SERPL-MCNC: 1.1 MG/DL (ref 0.6–1.3)
CREAT SERPL-MCNC: 1.12 MG/DL (ref 0.6–1.3)
EOSINOPHIL # BLD AUTO: 0.04 THOUSAND/ÂΜL (ref 0–0.61)
EOSINOPHIL NFR BLD AUTO: 0 % (ref 0–6)
ERYTHROCYTE [DISTWIDTH] IN BLOOD BY AUTOMATED COUNT: 18.5 % (ref 11.6–15.1)
GFR SERPL CREATININE-BSD FRML MDRD: 48 ML/MIN/1.73SQ M
GFR SERPL CREATININE-BSD FRML MDRD: 49 ML/MIN/1.73SQ M
GFR SERPL CREATININE-BSD FRML MDRD: 49 ML/MIN/1.73SQ M
GLUCOSE SERPL-MCNC: 158 MG/DL (ref 65–140)
GLUCOSE SERPL-MCNC: 165 MG/DL (ref 65–140)
GLUCOSE SERPL-MCNC: 165 MG/DL (ref 65–140)
GLUCOSE SERPL-MCNC: 166 MG/DL (ref 65–140)
GLUCOSE SERPL-MCNC: 167 MG/DL (ref 65–140)
GLUCOSE SERPL-MCNC: 179 MG/DL (ref 65–140)
GLUCOSE SERPL-MCNC: 187 MG/DL (ref 65–140)
HCT VFR BLD AUTO: 31.8 % (ref 34.8–46.1)
HGB BLD-MCNC: 9.3 G/DL (ref 11.5–15.4)
IMM GRANULOCYTES # BLD AUTO: 0.12 THOUSAND/UL (ref 0–0.2)
IMM GRANULOCYTES NFR BLD AUTO: 1 % (ref 0–2)
LYMPHOCYTES # BLD AUTO: 0.72 THOUSANDS/ÂΜL (ref 0.6–4.47)
LYMPHOCYTES NFR BLD AUTO: 5 % (ref 14–44)
MCH RBC QN AUTO: 22.5 PG (ref 26.8–34.3)
MCHC RBC AUTO-ENTMCNC: 29.2 G/DL (ref 31.4–37.4)
MCV RBC AUTO: 77 FL (ref 82–98)
MONOCYTES # BLD AUTO: 0.68 THOUSAND/ÂΜL (ref 0.17–1.22)
MONOCYTES NFR BLD AUTO: 5 % (ref 4–12)
NEUTROPHILS # BLD AUTO: 12.18 THOUSANDS/ÂΜL (ref 1.85–7.62)
NEUTS SEG NFR BLD AUTO: 89 % (ref 43–75)
NRBC BLD AUTO-RTO: 0 /100 WBCS
OSMOLALITY UR/SERPL-RTO: 286 MMOL/KG (ref 282–298)
PLATELET # BLD AUTO: 381 THOUSANDS/UL (ref 149–390)
PMV BLD AUTO: 10.4 FL (ref 8.9–12.7)
POTASSIUM SERPL-SCNC: 4.2 MMOL/L (ref 3.5–5.3)
POTASSIUM SERPL-SCNC: 4.4 MMOL/L (ref 3.5–5.3)
POTASSIUM SERPL-SCNC: 4.4 MMOL/L (ref 3.5–5.3)
RBC # BLD AUTO: 4.14 MILLION/UL (ref 3.81–5.12)
SODIUM SERPL-SCNC: 127 MMOL/L (ref 135–147)
SODIUM SERPL-SCNC: 127 MMOL/L (ref 135–147)
SODIUM SERPL-SCNC: 129 MMOL/L (ref 135–147)
WBC # BLD AUTO: 13.79 THOUSAND/UL (ref 4.31–10.16)

## 2024-06-01 PROCEDURE — 99223 1ST HOSP IP/OBS HIGH 75: CPT | Performed by: INTERNAL MEDICINE

## 2024-06-01 PROCEDURE — 82948 REAGENT STRIP/BLOOD GLUCOSE: CPT

## 2024-06-01 PROCEDURE — 85025 COMPLETE CBC W/AUTO DIFF WBC: CPT

## 2024-06-01 PROCEDURE — 80048 BASIC METABOLIC PNL TOTAL CA: CPT

## 2024-06-01 PROCEDURE — 80048 BASIC METABOLIC PNL TOTAL CA: CPT | Performed by: INTERNAL MEDICINE

## 2024-06-01 PROCEDURE — 99232 SBSQ HOSP IP/OBS MODERATE 35: CPT | Performed by: FAMILY MEDICINE

## 2024-06-01 PROCEDURE — 83930 ASSAY OF BLOOD OSMOLALITY: CPT | Performed by: INTERNAL MEDICINE

## 2024-06-01 RX ORDER — SOTALOL HYDROCHLORIDE 80 MG/1
80 TABLET ORAL EVERY 12 HOURS SCHEDULED
Status: DISCONTINUED | OUTPATIENT
Start: 2024-06-01 | End: 2024-06-01

## 2024-06-01 RX ORDER — FUROSEMIDE 10 MG/ML
40 INJECTION INTRAMUSCULAR; INTRAVENOUS
Status: DISCONTINUED | OUTPATIENT
Start: 2024-06-01 | End: 2024-06-03

## 2024-06-01 RX ORDER — METOPROLOL TARTRATE 50 MG/1
50 TABLET, FILM COATED ORAL EVERY 12 HOURS
Status: DISCONTINUED | OUTPATIENT
Start: 2024-06-01 | End: 2024-06-08 | Stop reason: HOSPADM

## 2024-06-01 RX ORDER — SODIUM CHLORIDE 9 MG/ML
100 INJECTION, SOLUTION INTRAVENOUS CONTINUOUS
Status: DISCONTINUED | OUTPATIENT
Start: 2024-06-01 | End: 2024-06-01

## 2024-06-01 RX ORDER — VENLAFAXINE 25 MG/1
50 TABLET ORAL
Status: DISCONTINUED | OUTPATIENT
Start: 2024-06-01 | End: 2024-06-01

## 2024-06-01 RX ORDER — SOTALOL HYDROCHLORIDE 80 MG/1
80 TABLET ORAL EVERY 12 HOURS SCHEDULED
Status: DISCONTINUED | OUTPATIENT
Start: 2024-06-01 | End: 2024-06-08 | Stop reason: HOSPADM

## 2024-06-01 RX ORDER — SODIUM BICARBONATE 650 MG/1
1300 TABLET ORAL
Status: DISCONTINUED | OUTPATIENT
Start: 2024-06-01 | End: 2024-06-03

## 2024-06-01 RX ORDER — LOSARTAN POTASSIUM 50 MG/1
50 TABLET ORAL DAILY
Status: DISCONTINUED | OUTPATIENT
Start: 2024-06-01 | End: 2024-06-01

## 2024-06-01 RX ORDER — SODIUM CHLORIDE 1 G/1
2 TABLET ORAL ONCE
Status: COMPLETED | OUTPATIENT
Start: 2024-06-01 | End: 2024-06-01

## 2024-06-01 RX ADMIN — METOPROLOL TARTRATE 25 MG: 25 TABLET, FILM COATED ORAL at 09:03

## 2024-06-01 RX ADMIN — LOSARTAN POTASSIUM 50 MG: 50 TABLET, FILM COATED ORAL at 10:54

## 2024-06-01 RX ADMIN — SOTALOL HYDROCHLORIDE 80 MG: 80 TABLET ORAL at 12:53

## 2024-06-01 RX ADMIN — BUSPIRONE HYDROCHLORIDE 7.5 MG: 5 TABLET ORAL at 20:38

## 2024-06-01 RX ADMIN — APIXABAN 5 MG: 5 TABLET, FILM COATED ORAL at 18:38

## 2024-06-01 RX ADMIN — VENLAFAXINE 100 MG: 25 TABLET ORAL at 21:07

## 2024-06-01 RX ADMIN — INSULIN LISPRO 1 UNITS: 100 INJECTION, SOLUTION INTRAVENOUS; SUBCUTANEOUS at 09:10

## 2024-06-01 RX ADMIN — Medication 3 MG: at 20:37

## 2024-06-01 RX ADMIN — SOTALOL HYDROCHLORIDE 80 MG: 80 TABLET ORAL at 20:38

## 2024-06-01 RX ADMIN — INSULIN LISPRO 1 UNITS: 100 INJECTION, SOLUTION INTRAVENOUS; SUBCUTANEOUS at 12:54

## 2024-06-01 RX ADMIN — SOTORASIB 960 MG: 120 TABLET, COATED ORAL at 09:06

## 2024-06-01 RX ADMIN — INSULIN LISPRO 1 UNITS: 100 INJECTION, SOLUTION INTRAVENOUS; SUBCUTANEOUS at 18:38

## 2024-06-01 RX ADMIN — METOPROLOL TARTRATE 50 MG: 50 TABLET, FILM COATED ORAL at 20:38

## 2024-06-01 RX ADMIN — FOLIC ACID 1 MG: 1 TABLET ORAL at 09:02

## 2024-06-01 RX ADMIN — GABAPENTIN 200 MG: 100 CAPSULE ORAL at 09:03

## 2024-06-01 RX ADMIN — APIXABAN 5 MG: 5 TABLET, FILM COATED ORAL at 09:02

## 2024-06-01 RX ADMIN — SODIUM CHLORIDE 100 ML/HR: 0.9 INJECTION, SOLUTION INTRAVENOUS at 11:42

## 2024-06-01 RX ADMIN — ACETAMINOPHEN 975 MG: 325 TABLET, FILM COATED ORAL at 23:55

## 2024-06-01 RX ADMIN — PANTOPRAZOLE SODIUM 40 MG: 40 TABLET, DELAYED RELEASE ORAL at 06:22

## 2024-06-01 RX ADMIN — SODIUM CHLORIDE 2 G: 1 TABLET ORAL at 12:53

## 2024-06-01 RX ADMIN — GABAPENTIN 200 MG: 100 CAPSULE ORAL at 18:38

## 2024-06-01 RX ADMIN — ATORVASTATIN CALCIUM 40 MG: 40 TABLET, FILM COATED ORAL at 09:02

## 2024-06-01 RX ADMIN — ASPIRIN 81 MG: 81 TABLET, COATED ORAL at 09:03

## 2024-06-01 RX ADMIN — VITAM B12 100 MCG: 100 TAB at 09:02

## 2024-06-01 RX ADMIN — INSULIN LISPRO 1 UNITS: 100 INJECTION, SOLUTION INTRAVENOUS; SUBCUTANEOUS at 22:35

## 2024-06-01 RX ADMIN — CEFTRIAXONE SODIUM 1000 MG: 10 INJECTION, POWDER, FOR SOLUTION INTRAVENOUS at 12:53

## 2024-06-01 RX ADMIN — FUROSEMIDE 20 MG: 20 TABLET ORAL at 09:02

## 2024-06-01 RX ADMIN — SODIUM BICARBONATE 1300 MG: 650 TABLET ORAL at 18:38

## 2024-06-01 RX ADMIN — Medication 7.5 MG: at 23:13

## 2024-06-01 NOTE — DISCHARGE SUMMARY
[facility]  Discharge- Melany Griffith 1950, 73 y.o. female MRN: [id number]  Unit/Bed#: S -01 Encounter: [id number]  Primary Care Provider: Salina Dwyer DO   Date and time admitted to hospital: 5/28/2024  3:46 PM      Ambulatory dysfunction  Assessment & Plan  - Patient's PT/OT evaluation recommended [facility] facility at discharge.   - Patient still requiring assistance with mobility   - Discussed with the patient about what to expect at facility.  - Patient stable for discharge to rehab facility .     * ADITYA on CKD  Assessment & Plan  Lab Results   Component Value Date    EGFR 43 06/08/2024    EGFR 40 06/07/2024    EGFR 32 06/06/2024    CREATININE 1.23 06/08/2024    CREATININE 1.30 06/07/2024    CREATININE 1.57 (H) 06/06/2024     POA Cr 1.57   Baseline Cr 0.9-1.1  Nephrology consulted and mentioned suspicion for etiology being cardiorenal syndrome  Diuretics were held; Creatinine stable  US kidney unremarkable; no hydronephrosis.   Patient's Cr lowered again overnight, moving closer to his baseline.   Patient's dry weight today at:155.4 lbs.   Patient was deemed stable for discharge and instructions were provided on when to start Torsemide.    Patient was amenable to plan and will follow up with her PCP within 1-week from discharge date.   Lastly, the patient's Valsartan was discontinued due to her renal disease.      Plan:  Continue fluid restriction 1800 ml / day  Avoid hypotension and continue to monitor BP closely  Avoid Nephrotoxins and adjust renally excreted medications  Discontinue Valsartan.   Follow up with PCP within 1-week from discharge date.   Patient stable for discharge to [facility].     Adenocarcinoma of lung, stage 4 (HCC)  Assessment & Plan  S/p chemo, radiation, and immunotherapy. Follows w/ Dr. Sam outpatient. Currently on Sotorasib only.   Patient's Sotorasib is running low and will need refill   Spoke with daughter Liz on 6/6 patient needs more Sotorasib  while inpatient; family will be bringing more today  Follow-up with Heme/Onc Dr. Sam  Palliative consulted for goals of care discussion        Paroxysmal atrial fibrillation (HCC)  Assessment & Plan  Continue home eliquis, sotalol. Metoprolol tartrate increased to 50 BID for better HR contol.     Lower extremity edema  Assessment & Plan  Stable for discharge.   No evidence of acute CHF exacerbation at this time   No edema on exam    Chronic diastolic heart failure (HCC)  Assessment & Plan  Wt Readings from Last 3 Encounters:   06/07/24 68.7 kg (151 lb 7.3 oz)   05/21/24 69.3 kg (152 lb 12.8 oz)   05/20/24 71.4 kg (157 lb 8 oz)     Last Echo in 2022 showed EF 60, grade 1 diastolic dysfunction; home Lasix recently increased to 40 mg daily prn for swelling likely causing ADITYA.    Torsemide held 6/5 due to abrupt elevation in Cr overnight; lower Extremity Edema appears to be improving since admission; Nephrology following; patient denied any SOB/coughs  Lower extremity edema significantly improved    Plan:  Continue fluid restriction 2000 ml/day  Monitor Urine Output; I/O's  Torsemide instructions provided on when to use as currently nephrology recommending PRN and not to take QD.     Hyponatremia  Assessment & Plan  Recent Labs     06/05/24  0503 06/06/24  0458 06/07/24  0544   SODIUM 132* 129* 132*         Likely 2/2 SIADH in the setting of Adenocarcinoma of the Lung - stage 4   Encouraged PO intake - but patient is also fluid restricted due to her CHF.   Hyponatremia stable at 132    Plan:  Nephrology following; cleared by nephrology  Salt tablets 1 g x2     Fall  Assessment & Plan  S/p mechanical fall with no LOC. On eliquis. Sustained hit to back of the head and R shoulder. Trauma workup negative. Not currently in any pain.   Reported left hip and left knee pain; X-rays were ordered  Due to worsening neurologic function including imbalance and poor coordination of left side extremities CT head was repeated  on 6/3 which was unremarkable and neurology was consulted    Plan  Tylenol prn for pain control  PT/OT recommending PAR, which patient agrees is a good idea  Fall precautions  Neurology consulted, recs appreciated:  MRI was unable to be tolerated by patient; after discussing with neurology, patient can be discharged without imaging and follow up outpatient in 6 weeks    Acute pain of right shoulder  Assessment & Plan  S/p fall. Trauma XR's all negative. Tylenol 975 prn.     Type 2 diabetes mellitus with stage 3 chronic kidney disease, without long-term current use of insulin, unspecified whether stage 3a or 3b CKD (HCC)  Assessment & Plan  Lab Results   Component Value Date    HGBA1C 7.2 (H) 05/08/2024       Recent Labs     06/06/24  1557 06/06/24  2125 06/07/24  0723 06/07/24  1145   POCGLU 163* 175* 173* 231*         Blood Sugar Average: Last 72 hrs:  (P) [id number]    Stable for discharge  Re-start home medications per discharge instructions/AVS.     Constipation  Assessment & Plan  Restart home regimen, adjust medications and diet accordingly.     Sacral wound  Assessment & Plan  Small wound present in the R sacral/buttock area. Small amount of scabbing present, no current discharge. Low suspicion of infection.     Plan  Wound care consulted - instructions provided in discharge packet/instructions.   Stable for discharge.     Severe protein-calorie malnutrition (HCC)  Assessment & Plan  Malnutrition Findings:   Adult Malnutrition type: Chronic illness  Adult Degree of Malnutrition: Other severe protein calorie malnutrition  Malnutrition Characteristics: Weight loss, Inadequate energy, Muscle loss                  360 Statement: Chronic/severe malnutrition r/t decreased PO intake/condition, as evidenced by 9.5% wt loss x 3 months (5/21/24: 152#, 2/27/24: 168#),  intake meeting <75% of estimated needs for > 1 month, and severe muscle mass depletion (temporal region). Treatment: liberal PO diet +  nutrition supplements    BMI Findings:           Body mass index is 27.7 kg/m².   Remeron 7.5 tonight for appetite, sleep and mood  D/c remeron given sedation. Consider restarting outpatient once renal function stable    Sepsis (HCC)-resolved as of 6/7/2024  Assessment & Plan  Met SIRS with HR 90s and WBC >12. Suspect urinary source given UA on admission. Patient unreliable and unable to give symptomatic history.   Treated UTI with ceftriaxone; 3 day course completed on 6/2   S/p IV hydration, blood pressures supported without further fluids  Monitor off antibiotics      Medical Problems       Resolved Problems  Date Reviewed: 6/7/2024            Resolved    Moderate protein-calorie malnutrition (HCC) 5/31/2024     Resolved by  Emmanuelle Velasquez MD    Sepsis (HCC) 6/7/2024     Resolved by  Jeana Edwards MD        Discharging Resident: Edenilson Scherer DO  Discharging Attending: Dr. Shawn Ferrer D.O.   PCP: Salina Dwyer DO  Admission Date:   Admission Orders (From admission, onward)       Ordered        05/29/24 1323  INPATIENT ADMISSION  Once            05/28/24 1742  Place in Observation  Once                          Discharge Date: 06/08/24    Consultations During Hospital Stay:  Nephrology  PT/OT: post acute rehab    Procedures Performed:   none    Significant Findings / Test Results:   none    Incidental Findings:   none     Test Results Pending at Discharge (will require follow up):  none     Outpatient Tests Requested:  BMP in one week    Complications:  none    Reason for Admission: Fall, ambulatory dysfunction    Hospital Course:   Melany Griffith is a 73 y.o. female patient who originally presented to the hospital on 5/28/2024 due to mechanical fall at home. Evaluated by trauma and negative for fracture. Patient admitted with ADITYA on CKD and creatinine improved with IV fluids. Patient had persistent lower extremity edema thought to be 2/2 sotorasib. Intermittently given fluids and  diuretics while admitted in response to creatinine and hyponatremia. Nephrology consulted and recommendations were made. Hyponatremia resolved after Tolvaptan treatment x2. Patient had an unwitnessed fall and imaging including head CT, CT spine and cervical was negative. There were concerns on ongoing ambulation dysfunction and coordination and neurology was consulted. MRI brain and cervical was attempted, however patient was unable to tolerate it.  After discussion with neurology, it was agreed that patient could follow-up outpatient with neurology.  Due to concerns of malnutrition and low appetite, palliative was consulted for goals of care discussion.  It was determined by the family that they will follow-up outpatient with palliative and will continue with current treatment.  Renal function continued to improve and was stable after diuretics being held.  Hyponatremia also resolved after salt tablets.  Patient will need to start torsemide 10 mg daily as recommended by nephrology.  She will also need to follow-up outpatient with nephrology.    Patient with severe malnutrition, requiring nutrition consult. Ensure provided.    Please see above list of diagnoses and related plan for additional information.     Condition at Discharge: stable    Discharge Day Visit / Exam:   Subjective:  Denies any new problems this AM.  Asked questions pertaining what to expect during rehab.     Vitals: Blood Pressure: 106/67 (06/08/24 0651)  Pulse: (!) 125 (06/08/24 0651)  Temperature: 97.6 °F (36.4 °C) (06/08/24 0651)  Temp Source: Oral (06/06/24 1525)  Respirations: 18 (06/08/24 0651)  Weight - Scale: 70.5 kg (155 lb 6.4 oz) (06/08/24 1045)  SpO2: 97 % (06/08/24 0651)  Exam:   Physical Exam  Constitutional:       General: She is not in acute distress.     Appearance: Normal appearance. She is not ill-appearing.   Cardiovascular:      Rate and Rhythm: Normal rate and regular rhythm.      Pulses: Normal pulses.      Heart sounds:  Normal heart sounds.   Pulmonary:      Effort: Pulmonary effort is normal.      Breath sounds: Normal breath sounds.   Abdominal:      General: Abdomen is flat. Bowel sounds are normal. There is no distension.      Palpations: Abdomen is soft.      Tenderness: There is no abdominal tenderness. There is no rebound.   Musculoskeletal:      Right lower leg: No edema.      Left lower leg: No edema.   Skin:     Capillary Refill: Capillary refill takes less than 2 seconds.   Neurological:      General: No focal deficit present.      Mental Status: She is alert. Mental status is at baseline.          Discussion with Family: Updated  (daughter) at bedside.    Discharge instructions/Information to patient and family:   See after visit summary for information provided to patient and family.      Provisions for Follow-Up Care:  See after visit summary for information related to follow-up care and any pertinent home health orders.      Mobility at time of Discharge:   Basic Mobility Inpatient Raw Score: 11  JH-HLM Goal: 4: Move to chair/commode  JH-HLM Achieved: 4: Move to chair/commode  HLM Goal NOT achieved. Continue to encourage mobility in post discharge setting.     Disposition: Rehab facility     Planned Readmission: NONE    Discharge Medications:  See after visit summary for reconciled discharge medications provided to patient and/or family.      **Please Note: This note may have been constructed using a voice recognition system**

## 2024-06-01 NOTE — ASSESSMENT & PLAN NOTE
Recent Labs     05/31/24  0429 06/01/24  0610 06/01/24  1259   SODIUM 129* 127* 129*         Will consult nephrology given worsening  Etiology per nephro: Hyponatremia most likely due to SIADH as patient on  mirtazapine, Effexor and buspirone , PPI which could cause SIADH and hyponatremia.   Has underlying stage IV lung carcinoma which could cause SIADH.  Also has component of fluid overload   Lasix 40 mg IV BID  S/p Tolvaptan x1 6/1

## 2024-06-01 NOTE — CONSULTS
Consultation - Nephrology   Melany RADHA Griffith 73 y.o. female MRN: [id number]  Unit/Bed#: S -01 Encounter: [id number]    ASSESSMENT and PLAN:  Hyponatremia  -Admission Sodium: 130 meq/L  -Baseline Sodium: Usually at normal range but had a sodium of 133 on May 8  -Work Up: Urine Na: 17, Urine Osmolality not checked, Serum Osmolality not checked recently   -Etiology: Hyponatremia most likely due to SIADH as patient on  mirtazapine, Effexor and buspirone , PPI which could cause SIADH and hyponatremia. Also Sotorasib could cause hyponatremia.  Has underlying stage IV lung carcinoma which could cause SIADH.  Also has component of fluid overload  -Hospital Course: Sodium level dropped to 127 meq/L today and so nephrology consulted  -Plan:   Ordered workup for hyponatremia, urine studies showed urine sodium 17 which does not indicate SIADH.  Clinically does not look volume depleted as he has significant lower extremity edema.  Stopped further IV normal saline, ordered for IV Lasix 40 mg twice daily.  Ordered for salt tablets 2 g x 1 dose and in the setting of metabolic acidosis started on sodium bicarbonate tablet and will monitor sodium level.  If sodium level not improving may consider tolvaptan in the setting of lower extremity edema and normal LFTs  Fluid restriction 1.5 L/day    Metabolic acidosis: Bicarb level of 16 with anion gap of 15  -Started on sodium bicarbonate tablet  1300  mg twice daily and will monitor  -If anion gap remains elevated would recommend checking lactic acid level    Primary hypertension: Currently on losartan 50 mg daily and metoprolol 25 mg twice daily    Acute kidney injury, POA likely prerenal   -UA with finding of 4-10 RBCs as well as WBCs and will need to recheck in future as outpatient once her studies did  -Admission creatinine was 1.57 mg/dL and renal function now improved to creatinine 1.10 mg/dL  -Baseline creatinine has been around 0.9 to 1.1 mg/dL, continue to avoid nephrotoxins  continue to monitor    Bilateral lower extremity edema/fluid overload: UA without any proteinuria.  Ordered for IV Lasix 40 mg twice daily  Prior echo with ejection fraction 60%    Anemia, unspecified  - hemoglobin 9.3 g/dL continue to monitor    Adenocarcinoma of lung stage IV status post chemoradiation and immunotherapy currently on  Sotorasib     Ambulatory dysfunction: Management per primary team    Discussed with primary team that hyponatremia likely due to SIADH and stop further IV fluids and ordered for salt tablet as well as sodium bicarbonate tablet and agreed with the plan    HISTORY OF PRESENT ILLNESS:  Requesting Physician: Raymond Acevedo, *  Reason for Consult: Hyponatremia    Melany Griffith is a 73 y.o. female with history of stage IV lung carcinoma status post chemoradiation and immunotherapy, paroxysmal A-fib, heart failure with reduced ejection fraction, diabetes mellitus type 2, CVA who was admitted to [facility] after presenting with mechanical fall with trauma to the head.  No history of dizziness or loss of consciousness.  On 5/28 was found to have creatinine elevated to 1.57 mg/dL/acute kidney injury likely due to volume depletion and was started on IV fluid normal saline at 50 mill per hour.  Complaining of lower extremity edema for last couple weeks currently denies any nausea vomiting or shortness of breath    PAST MEDICAL HISTORY:  Past Medical History:   Diagnosis Date    A-fib (HCC)     Arthritis     Atrial fibrillation (HCC)     Confusion     Diabetes mellitus (HCC)     Diabetes mellitus type 2, uncomplicated (HCC)     Last assessed: 8/17/17    Encephalopathy 1/6/2022    Essential hypertension     Frozen shoulder     L shoulder    GERD (gastroesophageal reflux disease)     Hx of cancer of uterus     Last assessed: 8/21/15    Hyperlipidemia     Hypertension     Hyponatremia 11/16/2016    Lung mass     diagnosed 9/2016    Malignant neoplasm without specification of site  (HCC)     Skin cancer, basal cell     right eye area    Stage 4 lung cancer (HCC)     Thrombocytopenia, unspecified (HCC) 2023       PAST SURGICAL HISTORY:  Past Surgical History:   Procedure Laterality Date    APPENDECTOMY      BRONCHOSCOPY N/A 2016    Procedure: BRONCHOSCOPY FLEXIBLE;  Surgeon: Giles Alonso MD;  Location: [facility];  Service:     CHOLECYSTECTOMY      COLONOSCOPY      COLONOSCOPY      FL GUIDED CENTRAL VENOUS ACCESS DEVICE INSERTION  2021    GALLBLADDER SURGERY      HYSTERECTOMY      Total abdominal    JOINT REPLACEMENT      LARYNGOSCOPY      Flexible Fiberoptic, (Therapeutic), Resolved: 16    MOHS SURGERY      Micrographic Surgery Face    IA BRNCHSC INCL FLUOR GDNCE DX W/CELL WASHG SPX N/A 2017    Procedure: BRONCHOSCOPY FLEXIBLE;  Surgeon: Denzel Manning MD;  Location: [facility];  Service: Pulmonary    IA BRONCHOSCOPY NEEDLE BX TRACHEA MAIN STEM&/BRON N/A 2016    Procedure: EBUS; FROZEN SECTION ;  Surgeon: Giles Alonso MD;  Location: [facility];  Service: Thoracic    IA INSJ TUNNELED CTR VAD W/SUBQ PORT AGE 5 YR/> N/A 2021    Procedure: INSERTION VENOUS PORT ( PORT-A-CATH) IR;  Surgeon: Hansel Garduno DO;  [facility];  Service: Interventional Radiology    TONSILECTOMY AND ADNOIDECTOMY      TONSILLECTOMY      TOTAL KNEE ARTHROPLASTY Right 2014       SOCIAL HISTORY:  Social History     Substance and Sexual Activity   Alcohol Use No     Social History     Substance and Sexual Activity   Drug Use No     Social History     Tobacco Use   Smoking Status Former    Current packs/day: 0.00    Average packs/day: 1 pack/day for 50.0 years (50.0 ttl pk-yrs)    Types: Cigarettes    Start date:     Quit date:     Years since quittin.4    Passive exposure: Past   Smokeless Tobacco Never   Tobacco Comments    Quit in        FAMILY HISTORY:  Family History   Problem Relation Age of Onset    Heart disease Father         cardiac  disorder    Hypertension Father     Arthritis Father     Stroke Father         cerebrovascular accident    Skin cancer Father     Diabetes Mother     Heart disease Mother     Dementia Mother     Hypertension Mother     Thyroid disease Mother     Cancer Maternal Grandfather         of unknown origin    Cancer Family     Depression Family     Diabetes Family     Hyperlipidemia Family         essential    Heart disease Family     Hypertension Family     Stroke Family         syndrome    Lung cancer Paternal Uncle     Muscular dystrophy Brother        ALLERGIES:  Allergies   Allergen Reactions    Amoxicillin Hives    Amoxicillin Rash and Hives    Cardizem [Diltiazem] Rash     Rash      Statins Myalgia     Severe muscle aching  Terrible pains    Zofran [Ondansetron] Palpitations       MEDICATIONS:    Current Facility-Administered Medications:     acetaminophen (TYLENOL) tablet 975 mg, 975 mg, Oral, Q8H PRN, Rito Sheridan DO, 975 mg at 05/31/24 1540    apixaban (ELIQUIS) tablet 5 mg, 5 mg, Oral, BID, Rito Sheridan DO, 5 mg at 06/01/24 0902    aspirin (ECOTRIN LOW STRENGTH) EC tablet 81 mg, 81 mg, Oral, Daily, Rito Sheridan DO, 81 mg at 06/01/24 0903    atorvastatin (LIPITOR) tablet 40 mg, 40 mg, Oral, Daily, Rito Sheridan DO, 40 mg at 06/01/24 0902    busPIRone (BUSPAR) tablet 7.5 mg, 7.5 mg, Oral, HS, Rito Sheridan DO, 7.5 mg at 05/31/24 2052    ceftriaxone (ROCEPHIN) 1 g/50 mL in dextrose IVPB, 1,000 mg, Intravenous, Q24H, Emmanuelle Velasquez MD, Last Rate: 100 mL/hr at 05/31/24 1424, 1,000 mg at 05/31/24 1424    cyanocobalamin (VITAMIN B-12) tablet 100 mcg, 100 mcg, Oral, Daily, Rito Sheridan DO, 100 mcg at 06/01/24 0902    folic acid (FOLVITE) tablet 1 mg, 1 mg, Oral, Daily, Rito Sheridan DO, 1 mg at 06/01/24 0902    gabapentin (NEURONTIN) capsule 200 mg, 200 mg, Oral, BID, Rito Sheridan, , 200 mg at 06/01/24 0903    insulin lispro (HumALOG/ADMELOG) 100 units/mL subcutaneous injection 1-5 Units, 1-5 Units, Subcutaneous, TID AC, 1  Units at 06/01/24 0910 **AND** Fingerstick Glucose (POCT), , , TID AC, Rito Sheridan DO    insulin lispro (HumALOG/ADMELOG) 100 units/mL subcutaneous injection 1-5 Units, 1-5 Units, Subcutaneous, HS, Rito Sheridan DO, 1 Units at 05/30/24 2229    melatonin tablet 3 mg, 3 mg, Oral, HS, Marta Lazo MD, 3 mg at 05/31/24 2052    metoprolol tartrate (LOPRESSOR) tablet 25 mg, 25 mg, Oral, Q12H, Rito Sheridan DO, 25 mg at 06/01/24 0903    pantoprazole (PROTONIX) EC tablet 40 mg, 40 mg, Oral, Early Morning, Rito Sheridan DO, 40 mg at 06/01/24 0622    polyethylene glycol (MIRALAX) packet 17 g, 17 g, Oral, Daily, Rito Sheridan DO, 17 g at 05/29/24 0924    prochlorperazine (COMPAZINE) tablet 10 mg, 10 mg, Oral, TID PRN, Rito Sheridan DO    senna (SENOKOT) tablet 8.6 mg, 1 tablet, Oral, Daily, Rito Sheridan DO, 8.6 mg at 05/29/24 0923    sodium chloride 0.9 % infusion, 100 mL/hr, Intravenous, Continuous, Marta Lazo MD, Last Rate: 100 mL/hr at 06/01/24 1142, 100 mL/hr at 06/01/24 1142    sotalol (BETAPACE) tablet 80 mg, 80 mg, Oral, Q12H [facility], Emmanuelle Velasquez MD    Sotorasib TABS 960 mg, 960 mg, Oral, Daily, Rito Sheridan DO, 960 mg at 06/01/24 0906    venlafaxine (EFFEXOR) tablet 100 mg, 100 mg, Oral, HS, Marta Lazo MD    REVIEW OF SYSTEMS:   Review of Systems   Constitutional:  Negative for activity change, appetite change, chills, diaphoresis, fatigue and fever.   HENT:  Negative for congestion, facial swelling and nosebleeds.    Eyes:  Negative for pain and visual disturbance.   Respiratory:  Negative for cough, chest tightness and shortness of breath.    Cardiovascular:  Positive for leg swelling. Negative for chest pain and palpitations.   Gastrointestinal:  Negative for abdominal distention, abdominal pain, diarrhea, nausea and vomiting.   Genitourinary:  Negative for difficulty urinating, dysuria, flank pain, frequency and hematuria.   Musculoskeletal:  Negative for arthralgias, back pain  and joint swelling.   Skin:  Negative for rash.   Neurological:  Negative for dizziness, seizures, syncope, weakness and headaches.   Psychiatric/Behavioral:  Negative for agitation and confusion. The patient is not nervous/anxious.        All the systems were reviewed and were negative except as documented on the HPI.    PHYSICAL EXAM:  Current Weight: Weight - Scale: 68 kg (149 lb 14.6 oz)  First Weight: Weight - Scale: 68.4 kg (150 lb 12.7 oz)  Vitals:    05/31/24 2051 05/31/24 2057 06/01/24 0700 06/01/24 0737   BP: 135/90 117/79  123/74   BP Location:       Pulse: 100 (!) 109  (!) 114   Resp:       Temp:    (!) 97.4 °F (36.3 °C)   TempSrc:       SpO2:  98%  95%   Weight:   68 kg (149 lb 14.6 oz)        Intake/Output Summary (Last 24 hours) at 6/1/2024 1206  Last data filed at 6/1/2024 0900  Gross per 24 hour   Intake 980 ml   Output 600 ml   Net 380 ml     Physical Exam  Constitutional:       General: She is not in acute distress.     Appearance: Normal appearance. She is well-developed.   HENT:      Head: Normocephalic and atraumatic.      Nose: Nose normal.      Mouth/Throat:      Mouth: Mucous membranes are moist.   Eyes:      Conjunctiva/sclera: Conjunctivae normal.      Pupils: Pupils are equal, round, and reactive to light.   Neck:      Thyroid: No thyromegaly.      Vascular: No JVD.   Cardiovascular:      Rate and Rhythm: Normal rate and regular rhythm.      Heart sounds: Normal heart sounds. No murmur heard.     No friction rub.   Pulmonary:      Effort: Pulmonary effort is normal.      Breath sounds: Normal breath sounds. No wheezing or rales.   Abdominal:      General: Abdomen is flat. There is no distension.      Tenderness: There is no abdominal tenderness. There is no right CVA tenderness or left CVA tenderness.   Musculoskeletal:         General: No deformity.      Cervical back: Neck supple.      Right lower leg: Edema present.      Left lower leg: Edema present.   Skin:     General: Skin is warm  "and dry.      Coloration: Skin is not jaundiced.   Neurological:      Mental Status: She is alert and oriented to person, place, and time.   Psychiatric:         Mood and Affect: Mood normal.         Behavior: Behavior normal.         Thought Content: Thought content normal.            Invasive Devices:        Lab Results:   Results from last 7 days   Lab Units 06/01/24  0643 06/01/24  0610 05/31/24  0429 05/30/24  0534 05/29/24  0442 05/28/24  1606 05/28/24  1605 05/28/24  1605 05/28/24  1602   WBC Thousand/uL 13.79*  --  14.51* 11.71*   < >  --   --   --  16.83*   HEMOGLOBIN g/dL 9.3*  --  8.8* 9.3*   < >  --   --   --  9.9*   I STAT HEMOGLOBIN g/dl  --   --   --   --   --  10.2*   < > 11.2*  --    HEMATOCRIT % 31.8*  --  30.7* 32.4*   < >  --   --   --  33.9*   HEMATOCRIT, ISTAT %  --   --   --   --   --  30*   < > 33*  --    PLATELETS Thousands/uL 381  --  376 390   < >  --   --   --  456*   POTASSIUM mmol/L  --  4.4 3.6 3.6   < >  --   --   --  4.8   CHLORIDE mmol/L  --  96 97 97   < >  --   --   --  95*   CO2 mmol/L  --  16* 24 24   < >  --   --   --  23   CO2, I-STAT mmol/L  --   --   --   --   --  25   < > 25  --    BUN mg/dL  --  26* 30* 39*   < >  --   --   --  37*   CREATININE mg/dL  --  1.10 1.17 1.51*   < >  --   --   --  1.57*   CALCIUM mg/dL  --  9.0 8.9 9.0   < >  --   --   --  9.2   MAGNESIUM mg/dL  --   --  2.2 1.6*  --   --   --   --   --    ALK PHOS U/L  --   --   --   --   --   --   --   --  95   ALT U/L  --   --   --   --   --   --   --   --  11   AST U/L  --   --   --   --   --   --   --   --  13   GLUCOSE, ISTAT mg/dl  --   --   --   --   --  117  --  118  --     < > = values in this interval not displayed.       Other Studies: CT head was negative      Portions of the record may have been created with voice recognition software. Occasional wrong word or \"sound a like\" substitutions may have occurred due to the inherent limitations of voice recognition software. Read the chart carefully and " recognize, using context, where substitutions have occurred.If you have any questions, please contact the dictating provider.

## 2024-06-01 NOTE — PLAN OF CARE
Problem: Nutrition/Hydration-ADULT  Goal: Nutrient/Hydration intake appropriate for improving, restoring or maintaining nutritional needs  Description: Monitor and assess patient's nutrition/hydration status for malnutrition. Collaborate with interdisciplinary team and initiate plan and interventions as ordered.  Monitor patient's weight and dietary intake as ordered or per policy. Utilize nutrition screening tool and intervene as necessary. Determine patient's food preferences and provide high-protein, high-caloric foods as appropriate.     INTERVENTIONS:  - Monitor oral intake, urinary output, labs, and treatment plans  - Assess nutrition and hydration status and recommend course of action  - Evaluate amount of meals eaten  - Assist patient with eating if necessary   - Allow adequate time for meals  - Recommend/ encourage appropriate diets, oral nutritional supplements, and vitamin/mineral supplements  - Order, calculate, and assess calorie counts as needed  - Recommend, monitor, and adjust tube feedings and TPN/PPN based on assessed needs  - Assess need for intravenous fluids  - Provide specific nutrition/hydration education as appropriate  - Include patient/family/caregiver in decisions related to nutrition  Outcome: Progressing     Problem: INFECTION - ADULT  Goal: Absence or prevention of progression during hospitalization  Description: INTERVENTIONS:  - Assess and monitor for signs and symptoms of infection  - Monitor lab/diagnostic results  - Monitor all insertion sites, i.e. indwelling lines, tubes, and drains  - Monitor endotracheal if appropriate and nasal secretions for changes in amount and color  - [place] appropriate cooling/warming therapies per order  - Administer medications as ordered  - Instruct and encourage patient and family to use good hand hygiene technique  - Identify and instruct in appropriate isolation precautions for identified infection/condition  Outcome: Progressing     Problem:  Knowledge Deficit  Goal: Patient/family/caregiver demonstrates understanding of disease process, treatment plan, medications, and discharge instructions  Description: Complete learning assessment and assess knowledge base.  Interventions:  - Provide teaching at level of understanding  - Provide teaching via preferred learning methods  Outcome: Progressing

## 2024-06-01 NOTE — PROGRESS NOTES
[facility]  Progress Note  Name: Melany Griffith I  MRN: [id number]  Unit/Bed#: S -01 I Date of Admission: 5/28/2024   Date of Service: 6/1/2024 I Hospital Day: 3    Assessment & Plan   * ADITYA on CKD  Assessment & Plan  Lab Results   Component Value Date    EGFR 45 05/31/2024    EGFR 33 05/30/2024    EGFR 32 05/29/2024    CREATININE 1.17 05/31/2024    CREATININE 1.51 (H) 05/30/2024    CREATININE 1.57 (H) 05/29/2024       POA Cr 1.57. Baseline appears to be around 1. Consider most likely secondary to hypovolemia given that she endorses poor PO intake recently, as well as recent increase in prn lasix use for lower extremity swelling. Consider swelling most likely a medication side effect from Sotorasib.     Plan  S/p Gentle hydration NS  UA: moderate leuks, asymptomatic, will not treat at this time  Urine sodium: 17  Urine creatinine: 144, crt:albumin ratio of 25  Bladder scan/urinary retention protocol  Restarted home sotolol, on metoprolol. Hold home lasix  Home ARB held at this time, restart as BP permits  Per family, patient on valsartan 160 mg DAILY, not bid as listed in med rec  Promote normotension, avoid nephrotoxins    Fall  Assessment & Plan  S/p mechanical fall with no LOC. On eliquis. Sustained hit to back of the head and R shoulder. Trauma workup negative. Not currently in any pain.     Plan  Tylenol prn for pain control  PT/OT recommending PAR, which patient agrees is a good idea  Fall precautions    Adenocarcinoma of lung, stage 4 (HCC)  Assessment & Plan  S/p chemo, radiation, and immunotherapy. Follows w/ Dr. Sam outpatient. Currently on Sotorasib only.     Plan  Will provide sotorasib from home    Sacral wound  Assessment & Plan  Small wound present in the R sacral/buttock area. Small amount of scabbing present, no current discharge. Low suspicion of infection.     Plan  Wound care consult  No need for abx at this time    Acute pain of right shoulder  Assessment &  Plan  S/p fall. Trauma XR's all negative. Tylenol 975 prn.     Ambulatory dysfunction  Assessment & Plan  Recent increased weakness with several recent falls. She endorses significant weight loss and poor appetite over the past 2 months. Lives at home, occasionally uses cane or walker but generally does not. Orthostatic vitals negative.    Plan  PT/OT - recommended inpatient rehab, patient amenable to going  Fall precautions  Increased risk due to sedation 2/2 gabapentin and remeron  D/c remeron for now  Consider restarting when renal function is back to baseline and sedation has decreased  Nutrition consult      Type 2 diabetes mellitus with stage 3 chronic kidney disease, without long-term current use of insulin, unspecified whether stage 3a or 3b CKD ([facility])  Assessment & Plan  Lab Results   Component Value Date    HGBA1C 7.2 (H) 05/08/2024       Recent Labs     05/30/24  1529 05/30/24  2042 05/31/24  0724 05/31/24  1138   POCGLU 190* 187* 174* 95         Blood Sugar Average: Last 72 hrs:  (P) 167    Hold home medications. SSI algorithm 1, titrate as needed.     Hyponatremia  Assessment & Plan  Recent Labs     05/30/24  0534 05/31/24  0429 06/01/24  0610   SODIUM 131* 129* 127*       Likely 2/2 poor PO intake. Checked Urine sodium, appropriately low. Encourage PO intake and continue to monitor. Also probably has a component of SIADH 2/2 lung cancer/Effexor.   Will consult nephrology given worsening  D/c lasix for now    Constipation  Assessment & Plan  She notes ongoing issues with constipation at home. Miralax and Senna both ordered daily. Increase/reduce as needed.     Lower extremity edema  Assessment & Plan  Chronic but worsening recently. She does have a history of grade 1 diastolic dysfunction, but denies any recent SOB. Consider most likely secondary to Sotorasib use as this has been a reported side effect and pt does not appear to be in CHF exacerbation.     Plan  Apply compression stockings  B/L  lower extremity doppler wnl, no clots  Continue Sotorasib for now  D/c home dose lasix due to worsening hyponatremia    Chronic diastolic heart failure (HCC)  Assessment & Plan  Wt Readings from Last 3 Encounters:   05/31/24 69 kg (152 lb 1.9 oz)   05/21/24 69.3 kg (152 lb 12.8 oz)   05/20/24 71.4 kg (157 lb 8 oz)     Last Echo in 2022 showed EF 60, grade 1 diastolic dysfunction.   Lungs clear on exam, no SOB. 3+ lower extremitiy edema on exam, consider likely medication side effect from Sotorasib.   Home Lasix recently increased to 40 mg daily prn for swelling likely causing ADITYA    Plan  S/p Gentle hydration for ADITYA  D/c home lasix    Paroxysmal atrial fibrillation (HCC)  Assessment & Plan  Continue home eliquis, metoprolol. Confirm home sotalol + metoprolol per patients brother.    Moderate protein-calorie malnutrition (HCC)-resolved as of 5/31/2024  Assessment & Plan  Malnutrition Findings:   Adult Malnutrition type: Chronic illness  Adult Degree of Malnutrition: Other severe protein calorie malnutritionShe endorsed poor appetite and significant weight loss over the past 2 months, not completely sure how much. Nutrition consulted, input appreciated. Ensure ordered 10/2/HS.   Malnutrition Characteristics: Weight loss, Inadequate energy, Muscle loss                  360 Statement: Chronic/severe malnutrition r/t decreased PO intake/condition, as evidenced by 9.5% wt loss x 3 months (5/21/24: 152#, 2/27/24: 168#),  intake meeting <75% of estimated needs for > 1 month, and severe muscle mass depletion (temporal region). Treatment: liberal PO diet + nutrition supplements    BMI Findings:           Body mass index is 27.82 kg/m².     Severe protein-calorie malnutrition (HCC)  Assessment & Plan  Malnutrition Findings:   Adult Malnutrition type: Chronic illness  Adult Degree of Malnutrition: Other severe protein calorie malnutrition  Malnutrition Characteristics: Weight loss, Inadequate energy, Muscle loss                   360 Statement: Chronic/severe malnutrition r/t decreased PO intake/condition, as evidenced by 9.5% wt loss x 3 months (5/21/24: 152#, 2/27/24: 168#),  intake meeting <75% of estimated needs for > 1 month, and severe muscle mass depletion (temporal region). Treatment: liberal PO diet + nutrition supplements    BMI Findings:           Body mass index is 27.82 kg/m².   Remeron 7.5 tonight for appetite, sleep and mood  D/c remeron given sedation. Consider restarting outpatient once renal function stable    Sepsis (HCC)  Assessment & Plan  Meeting SIRS with HR 90s and WBC >12. Suspect urinary source given UA on admission. Patient unreliable and unable to give symptomatic history.   Treat UTI with ceftriaxone, plan for 5d course of antibiotics, may consider transition to PO on discharge  S/p IV hydration, blood pressures supported without further fluids  Trend WBC daily and vitals per unit routine           VTE Pharmacologic Prophylaxis: VTE Score: 7 High Risk (Score >/= 5) - Pharmacological DVT Prophylaxis Ordered: apixaban (Eliquis). Sequential Compression Devices Ordered.    Mobility:   Basic Mobility Inpatient Raw Score: 12  JH-HLM Goal: 4: Move to chair/commode  JH-HLM Achieved: 4: Move to chair/commode  JH-HLM Goal achieved. Continue to encourage appropriate mobility.    Patient Centered Rounds: I performed bedside rounds with nursing staff today.  Discussions with Specialists or Other Care Team Provider: attending physician, nephrology    Education and Discussions with Family / Patient: Updated  (daughter) via phone.  Called brother Patel at [phone] for med rec.  Current Length of Stay: 3 day(s)  Current Patient Status: Inpatient   Discharge Plan: Anticipate discharge in 24-48 hrs to rehab facility.    Code Status: Level 3 - DNAR and DNI    Subjective:   Melany was evaluated at bedside today. She denies chest pain, shortness of breath, abdominal pain, abnormal bowel or bladder function,  urinary symptoms. Feels she is eating well and slept well last night. Agrees yesterday she was more sleepy than usual. Today awake alert and sitting at side of bed. All questions answered. She is inquiring when she may be able to leave.     Objective:   Vitals:   Temp (24hrs), Av.4 °F (36.3 °C), Min:97.4 °F (36.3 °C), Max:97.4 °F (36.3 °C)    Temp:  [97.4 °F (36.3 °C)] 97.4 °F (36.3 °C)  HR:  [100-123] 114  BP: (117-135)/(74-92) 123/74  SpO2:  [84 %-98 %] 95 %  Body mass index is 27.42 kg/m².     Input and Output Summary (last 24 hours):     Intake/Output Summary (Last 24 hours) at 2024 1100  Last data filed at 2024 0900  Gross per 24 hour   Intake 980 ml   Output 600 ml   Net 380 ml       Physical Exam:   Physical Exam  Vitals and nursing note reviewed.   Constitutional:       Appearance: Normal appearance.      Comments: frail   HENT:      Head: Normocephalic and atraumatic.      Nose: Nose normal.      Mouth/Throat:      Mouth: Mucous membranes are dry.   Eyes:      Pupils: Pupils are equal, round, and reactive to light.   Cardiovascular:      Rate and Rhythm: Regular rhythm. Tachycardia present.      Pulses: Normal pulses.      Heart sounds: Normal heart sounds.   Pulmonary:      Effort: Pulmonary effort is normal. No respiratory distress.      Breath sounds: Normal breath sounds. No wheezing or rales.   Abdominal:      General: Abdomen is flat. There is no distension.      Palpations: Abdomen is soft.      Tenderness: There is no abdominal tenderness.   Musculoskeletal:      Right lower leg: Edema present.      Left lower leg: Edema present.      Comments: +stable 2+ pitting edema of legs   Skin:     General: Skin is warm and dry.      Capillary Refill: Capillary refill takes less than 2 seconds.   Neurological:      General: No focal deficit present.      Mental Status: She is alert and oriented to person, place, and time.   Psychiatric:         Mood and Affect: Mood normal.         Behavior:  Behavior normal.       Additional Data:   Labs:  Results from last 7 days   Lab Units 06/01/24  0643 05/31/24  0429   WBC Thousand/uL 13.79* 14.51*   HEMOGLOBIN g/dL 9.3* 8.8*   HEMATOCRIT % 31.8* 30.7*   PLATELETS Thousands/uL 381 376   BANDS PCT %  --  1   SEGS PCT % 89*  --    LYMPHO PCT % 5* 6*   MONO PCT % 5 6   EOS PCT % 0 0     Results from last 7 days   Lab Units 06/01/24  0610 05/28/24  1605 05/28/24  1602   SODIUM mmol/L 127*   < > 130*   POTASSIUM mmol/L 4.4   < > 4.8   CHLORIDE mmol/L 96   < > 95*   CO2 mmol/L 16*   < > 23   CO2, I-STAT   --    < >  --    BUN mg/dL 26*   < > 37*   CREATININE mg/dL 1.10   < > 1.57*   ANION GAP mmol/L 15*   < > 12   CALCIUM mg/dL 9.0   < > 9.2   ALBUMIN g/dL  --   --  3.2*   TOTAL BILIRUBIN mg/dL  --   --  0.57   ALK PHOS U/L  --   --  95   ALT U/L  --   --  11   AST U/L  --   --  13   GLUCOSE RANDOM mg/dL 167*   < > 118    < > = values in this interval not displayed.         Results from last 7 days   Lab Units 06/01/24  0737 05/31/24  1538 05/31/24  1138 05/31/24  0724 05/30/24  2042 05/30/24  1529 05/30/24  1117 05/30/24  0745 05/29/24  2130 05/29/24  1626 05/29/24  1241 05/29/24  0738   POC GLUCOSE mg/dl 158* 176* 95 174* 187* 190* 235* 169* 205* 131 147* 156*               Lines/Drains:  Invasive Devices       Central Venous Catheter Line  Duration             Port A Cath 12/14/21 Right Chest 900 days              Peripheral Intravenous Line  Duration             Peripheral IV 05/29/24 Left Antecubital 2 days    Peripheral IV 05/31/24 Left;Upper Arm <1 day                    Central Line:  Goal for removal: N/A - Chronic PICC             Imaging: No pertinent imaging reviewed.    Recent Cultures (last 7 days):         Last 24 Hours Medication List:   Current Facility-Administered Medications   Medication Dose Route Frequency Provider Last Rate    acetaminophen  975 mg Oral Q8H PRN Rito Sheridan DO      apixaban  5 mg Oral BID Rito Sheridan DO      aspirin  81 mg Oral  Daily Rito Sheridan, DO      atorvastatin  40 mg Oral Daily Rito Sheridan, DO      busPIRone  7.5 mg Oral HS Rito Sheridan, DO      cefTRIAXone  1,000 mg Intravenous Q24H Emmanuelle Velasquez MD 1,000 mg (05/31/24 0404)    cyanocobalamin  100 mcg Oral Daily Rito Loyjeremi, DO      folic acid  1 mg Oral Daily Rito Sheridan, DO      gabapentin  200 mg Oral BID Rito Sheridan, DO      insulin lispro  1-5 Units Subcutaneous TID AC Rito Sheridan, DO      insulin lispro  1-5 Units Subcutaneous HS Rito Sheridan, DO      melatonin  3 mg Oral HS Marta Lazo MD      metoprolol tartrate  25 mg Oral Q12H Rito Sheridan, DO      pantoprazole  40 mg Oral Early Morning Rito Sheridan, DO      polyethylene glycol  17 g Oral Daily Rito Sheridan, DO      prochlorperazine  10 mg Oral TID PRN Rito Sheridan, DO      senna  1 tablet Oral Daily Rito Sheridan, DO      sotalol  80 mg Oral Q12H STEPHIE Emmanuelle Velasquez MD      Sotorasib  960 mg Oral Daily Rito Sheridan, DO      venlafaxine  100 mg Oral HS Marta Lazo MD          Today, Patient Was Seen By: Emmanuelle Velasquez MD    **Please Note: This note may have been constructed using a voice recognition system.**

## 2024-06-02 ENCOUNTER — APPOINTMENT (INPATIENT)
Dept: CT IMAGING | Facility: HOSPITAL | Age: 74
DRG: 682 | End: 2024-06-02
Payer: MEDICARE

## 2024-06-02 LAB
ANION GAP SERPL CALCULATED.3IONS-SCNC: 10 MMOL/L (ref 4–13)
ANION GAP SERPL CALCULATED.3IONS-SCNC: 11 MMOL/L (ref 4–13)
ANION GAP SERPL CALCULATED.3IONS-SCNC: 11 MMOL/L (ref 4–13)
BUN SERPL-MCNC: 27 MG/DL (ref 5–25)
CALCIUM SERPL-MCNC: 8.6 MG/DL (ref 8.4–10.2)
CALCIUM SERPL-MCNC: 9 MG/DL (ref 8.4–10.2)
CALCIUM SERPL-MCNC: 9.1 MG/DL (ref 8.4–10.2)
CHLORIDE SERPL-SCNC: 94 MMOL/L (ref 96–108)
CHLORIDE SERPL-SCNC: 95 MMOL/L (ref 96–108)
CHLORIDE SERPL-SCNC: 95 MMOL/L (ref 96–108)
CO2 SERPL-SCNC: 23 MMOL/L (ref 21–32)
CO2 SERPL-SCNC: 24 MMOL/L (ref 21–32)
CO2 SERPL-SCNC: 24 MMOL/L (ref 21–32)
CREAT SERPL-MCNC: 1.17 MG/DL (ref 0.6–1.3)
CREAT SERPL-MCNC: 1.19 MG/DL (ref 0.6–1.3)
CREAT SERPL-MCNC: 1.27 MG/DL (ref 0.6–1.3)
ERYTHROCYTE [DISTWIDTH] IN BLOOD BY AUTOMATED COUNT: 18.9 % (ref 11.6–15.1)
GFR SERPL CREATININE-BSD FRML MDRD: 41 ML/MIN/1.73SQ M
GFR SERPL CREATININE-BSD FRML MDRD: 44 ML/MIN/1.73SQ M
GFR SERPL CREATININE-BSD FRML MDRD: 45 ML/MIN/1.73SQ M
GLUCOSE SERPL-MCNC: 158 MG/DL (ref 65–140)
GLUCOSE SERPL-MCNC: 175 MG/DL (ref 65–140)
GLUCOSE SERPL-MCNC: 184 MG/DL (ref 65–140)
GLUCOSE SERPL-MCNC: 202 MG/DL (ref 65–140)
GLUCOSE SERPL-MCNC: 203 MG/DL (ref 65–140)
GLUCOSE SERPL-MCNC: 235 MG/DL (ref 65–140)
GLUCOSE SERPL-MCNC: 280 MG/DL (ref 65–140)
HCT VFR BLD AUTO: 31.6 % (ref 34.8–46.1)
HGB BLD-MCNC: 9.2 G/DL (ref 11.5–15.4)
MCH RBC QN AUTO: 22.2 PG (ref 26.8–34.3)
MCHC RBC AUTO-ENTMCNC: 29.1 G/DL (ref 31.4–37.4)
MCV RBC AUTO: 76 FL (ref 82–98)
OSMOLALITY UR: 162 MMOL/KG
PLATELET # BLD AUTO: 388 THOUSANDS/UL (ref 149–390)
PMV BLD AUTO: 10.3 FL (ref 8.9–12.7)
POTASSIUM SERPL-SCNC: 3.5 MMOL/L (ref 3.5–5.3)
POTASSIUM SERPL-SCNC: 4.2 MMOL/L (ref 3.5–5.3)
POTASSIUM SERPL-SCNC: 4.8 MMOL/L (ref 3.5–5.3)
RBC # BLD AUTO: 4.14 MILLION/UL (ref 3.81–5.12)
SODIUM 24H UR-SCNC: <10 MOL/L
SODIUM SERPL-SCNC: 129 MMOL/L (ref 135–147)
TSH SERPL DL<=0.05 MIU/L-ACNC: 1.58 UIU/ML (ref 0.45–4.5)
URATE SERPL-MCNC: 6.1 MG/DL (ref 2–7.5)
WBC # BLD AUTO: 13.75 THOUSAND/UL (ref 4.31–10.16)

## 2024-06-02 PROCEDURE — 70450 CT HEAD/BRAIN W/O DYE: CPT

## 2024-06-02 PROCEDURE — 82948 REAGENT STRIP/BLOOD GLUCOSE: CPT

## 2024-06-02 PROCEDURE — 72128 CT CHEST SPINE W/O DYE: CPT

## 2024-06-02 PROCEDURE — 85027 COMPLETE CBC AUTOMATED: CPT

## 2024-06-02 PROCEDURE — 72125 CT NECK SPINE W/O DYE: CPT

## 2024-06-02 PROCEDURE — 99232 SBSQ HOSP IP/OBS MODERATE 35: CPT | Performed by: FAMILY MEDICINE

## 2024-06-02 PROCEDURE — 83935 ASSAY OF URINE OSMOLALITY: CPT | Performed by: INTERNAL MEDICINE

## 2024-06-02 PROCEDURE — 80048 BASIC METABOLIC PNL TOTAL CA: CPT | Performed by: INTERNAL MEDICINE

## 2024-06-02 PROCEDURE — 99232 SBSQ HOSP IP/OBS MODERATE 35: CPT | Performed by: INTERNAL MEDICINE

## 2024-06-02 PROCEDURE — 84550 ASSAY OF BLOOD/URIC ACID: CPT | Performed by: INTERNAL MEDICINE

## 2024-06-02 PROCEDURE — 99232 SBSQ HOSP IP/OBS MODERATE 35: CPT | Performed by: NURSE PRACTITIONER

## 2024-06-02 PROCEDURE — 84443 ASSAY THYROID STIM HORMONE: CPT | Performed by: INTERNAL MEDICINE

## 2024-06-02 PROCEDURE — 84300 ASSAY OF URINE SODIUM: CPT | Performed by: INTERNAL MEDICINE

## 2024-06-02 PROCEDURE — 84166 PROTEIN E-PHORESIS/URINE/CSF: CPT | Performed by: INTERNAL MEDICINE

## 2024-06-02 RX ORDER — TOLVAPTAN 15 MG/1
15 TABLET ORAL ONCE
Status: COMPLETED | OUTPATIENT
Start: 2024-06-02 | End: 2024-06-02

## 2024-06-02 RX ADMIN — SODIUM BICARBONATE 1300 MG: 650 TABLET ORAL at 08:16

## 2024-06-02 RX ADMIN — SOTALOL HYDROCHLORIDE 80 MG: 80 TABLET ORAL at 22:13

## 2024-06-02 RX ADMIN — ATORVASTATIN CALCIUM 40 MG: 40 TABLET, FILM COATED ORAL at 08:16

## 2024-06-02 RX ADMIN — CEFTRIAXONE SODIUM 1000 MG: 10 INJECTION, POWDER, FOR SOLUTION INTRAVENOUS at 12:21

## 2024-06-02 RX ADMIN — GABAPENTIN 200 MG: 100 CAPSULE ORAL at 08:15

## 2024-06-02 RX ADMIN — PANTOPRAZOLE SODIUM 40 MG: 40 TABLET, DELAYED RELEASE ORAL at 06:48

## 2024-06-02 RX ADMIN — INSULIN LISPRO 1 UNITS: 100 INJECTION, SOLUTION INTRAVENOUS; SUBCUTANEOUS at 17:41

## 2024-06-02 RX ADMIN — METOPROLOL TARTRATE 50 MG: 50 TABLET, FILM COATED ORAL at 08:16

## 2024-06-02 RX ADMIN — SODIUM BICARBONATE 1300 MG: 650 TABLET ORAL at 17:39

## 2024-06-02 RX ADMIN — ASPIRIN 81 MG: 81 TABLET, COATED ORAL at 08:16

## 2024-06-02 RX ADMIN — Medication 3 MG: at 22:13

## 2024-06-02 RX ADMIN — FOLIC ACID 1 MG: 1 TABLET ORAL at 08:16

## 2024-06-02 RX ADMIN — APIXABAN 5 MG: 5 TABLET, FILM COATED ORAL at 17:39

## 2024-06-02 RX ADMIN — SOTALOL HYDROCHLORIDE 80 MG: 80 TABLET ORAL at 08:16

## 2024-06-02 RX ADMIN — TOLVAPTAN 15 MG: 15 TABLET ORAL at 17:40

## 2024-06-02 RX ADMIN — INSULIN LISPRO 1 UNITS: 100 INJECTION, SOLUTION INTRAVENOUS; SUBCUTANEOUS at 12:33

## 2024-06-02 RX ADMIN — SOTORASIB 960 MG: 120 TABLET, COATED ORAL at 08:16

## 2024-06-02 RX ADMIN — GABAPENTIN 200 MG: 100 CAPSULE ORAL at 17:39

## 2024-06-02 RX ADMIN — BUSPIRONE HYDROCHLORIDE 7.5 MG: 5 TABLET ORAL at 22:13

## 2024-06-02 RX ADMIN — INSULIN LISPRO 1 UNITS: 100 INJECTION, SOLUTION INTRAVENOUS; SUBCUTANEOUS at 08:21

## 2024-06-02 RX ADMIN — VENLAFAXINE 100 MG: 25 TABLET ORAL at 22:14

## 2024-06-02 RX ADMIN — INSULIN LISPRO 2 UNITS: 100 INJECTION, SOLUTION INTRAVENOUS; SUBCUTANEOUS at 22:14

## 2024-06-02 RX ADMIN — APIXABAN 5 MG: 5 TABLET, FILM COATED ORAL at 08:16

## 2024-06-02 RX ADMIN — FUROSEMIDE 40 MG: 10 INJECTION, SOLUTION INTRAMUSCULAR; INTRAVENOUS at 17:39

## 2024-06-02 RX ADMIN — POLYETHYLENE GLYCOL 3350 17 G: 17 POWDER, FOR SOLUTION ORAL at 08:31

## 2024-06-02 RX ADMIN — SENNOSIDES 8.6 MG: 8.6 TABLET, FILM COATED ORAL at 08:16

## 2024-06-02 RX ADMIN — VITAM B12 100 MCG: 100 TAB at 08:16

## 2024-06-02 NOTE — ASSESSMENT & PLAN NOTE
Wt Readings from Last 3 Encounters:   06/01/24 68 kg (149 lb 14.6 oz)   05/21/24 69.3 kg (152 lb 12.8 oz)   05/20/24 71.4 kg (157 lb 8 oz)     Last Echo in 2022 showed EF 60, grade 1 diastolic dysfunction.   Lungs clear on exam, no SOB. 3+ lower extremitiy edema on exam, consider likely medication side effect from Sotorasib.   Home Lasix recently increased to 40 mg daily prn for swelling likely causing ADITYA    Plan  S/p Gentle hydration for ADITYA  Home lasix was held on arrival. Now on Lasix 40 mg IV BID per nephrology.

## 2024-06-02 NOTE — ASSESSMENT & PLAN NOTE
Lab Results   Component Value Date    HGBA1C 7.2 (H) 05/08/2024       Recent Labs     06/02/24  1614 06/02/24  2116 06/03/24  0712 06/03/24  1053   POCGLU 175* 235* 216* 203*       Blood Sugar Average: Last 72 hrs:  (P) 178.9558668284560631    Hold home medications. SSI algorithm 1, titrate as needed.

## 2024-06-02 NOTE — PROGRESS NOTES
[facility]  Progress Note  Name: Melany Griffith I  MRN: [id number]  Unit/Bed#: S -01 I Date of Admission: 5/28/2024   Date of Service: 6/2/2024 I Hospital Day: 4    Assessment & Plan   * ADITYA on CKD  Assessment & Plan  Lab Results   Component Value Date    EGFR 41 06/02/2024    EGFR 48 06/01/2024    EGFR 49 06/01/2024    CREATININE 1.27 06/02/2024    CREATININE 1.12 06/01/2024    CREATININE 1.09 06/01/2024       POA Cr 1.57. Baseline appears to be around 1. Consider most likely secondary to hypovolemia given that she endorses poor PO intake recently, as well as recent increase in prn lasix use for lower extremity swelling. Consider swelling most likely a medication side effect from Sotorasib.     Plan  S/p Gentle hydration NS  UA: moderate leuks, asymptomatic, will not treat at this time  Urine sodium: 17  Urine creatinine: 144, crt:albumin ratio of 25  Bladder scan/urinary retention protocol  Lasix 40 mg IV BID per nephrology  Restarted home sotolol, on metoprolol.   Home ARB held at this time, restart as BP permits  Per family, patient on valsartan 160 mg DAILY, not bid as listed in med rec  Promote normotension, avoid nephrotoxins    Lower extremity edema  Assessment & Plan  Chronic but worsening recently. She does have a history of grade 1 diastolic dysfunction, but denies any recent SOB. Consider most likely secondary to Sotorasib use as this has been a reported side effect and pt does not appear to be in CHF exacerbation.     Plan  Apply compression stockings  B/L lower extremity doppler wnl, no clots  Continue Sotorasib for now  Lasix 40 mg IV BID per nephrology    Ambulatory dysfunction  Assessment & Plan  Recent increased weakness with several recent falls. She endorses significant weight loss and poor appetite over the past 2 months. Lives at home, occasionally uses cane or walker but generally does not. Orthostatic vitals negative.    Plan  PT/OT - recommended inpatient  rehab, patient amenable to going  Fall precautions  Increased risk due to sedation 2/2 gabapentin and remeron  D/c remeron for now  Consider restarting when renal function is back to baseline and sedation has decreased  Nutrition consult      Fall  Assessment & Plan  S/p mechanical fall with no LOC. On eliquis. Sustained hit to back of the head and R shoulder. Trauma workup negative. Not currently in any pain.     Plan  Tylenol prn for pain control  PT/OT recommending PAR, which patient agrees is a good idea  Fall precautions    Adenocarcinoma of lung, stage 4 (HCC)  Assessment & Plan  S/p chemo, radiation, and immunotherapy. Follows w/ Dr. Sam outpatient. Currently on Sotorasib only.     Plan  Will provide sotorasib from home    Sepsis ([facility])  Assessment & Plan  Meeting SIRS with HR 90s and WBC >12. Suspect urinary source given UA on admission. Patient unreliable and unable to give symptomatic history.   Treat UTI with ceftriaxone, plan for 3 d course complete 6/2  S/p IV hydration, blood pressures supported without further fluids  Trend WBC daily and vitals per unit routine    Severe protein-calorie malnutrition (HCC)  Assessment & Plan  Malnutrition Findings:   Adult Malnutrition type: Chronic illness  Adult Degree of Malnutrition: Other severe protein calorie malnutrition  Malnutrition Characteristics: Weight loss, Inadequate energy, Muscle loss                  360 Statement: Chronic/severe malnutrition r/t decreased PO intake/condition, as evidenced by 9.5% wt loss x 3 months (5/21/24: 152#, 2/27/24: 168#),  intake meeting <75% of estimated needs for > 1 month, and severe muscle mass depletion (temporal region). Treatment: liberal PO diet + nutrition supplements    BMI Findings:           Body mass index is 27.42 kg/m².   Remeron 7.5 tonight for appetite, sleep and mood  D/c remeron given sedation. Consider restarting outpatient once renal function stable    Sacral wound  Assessment & Plan  Small wound  present in the R sacral/buttock area. Small amount of scabbing present, no current discharge. Low suspicion of infection.     Plan  Wound care consult  No need for abx at this time    Acute pain of right shoulder  Assessment & Plan  S/p fall. Trauma XR's all negative. Tylenol 975 prn.     Constipation  Assessment & Plan  She notes ongoing issues with constipation at home. Miralax and Senna both ordered daily. Increase/reduce as needed.     Type 2 diabetes mellitus with stage 3 chronic kidney disease, without long-term current use of insulin, unspecified whether stage 3a or 3b CKD (HCC)  Assessment & Plan  Lab Results   Component Value Date    HGBA1C 7.2 (H) 05/08/2024       Recent Labs     06/01/24  1144 06/01/24  1527 06/01/24  2114 06/02/24  0738   POCGLU 179* 187* 166* 158*         Blood Sugar Average: Last 72 hrs:  (P) [id number]    Hold home medications. SSI algorithm 1, titrate as needed.     Chronic diastolic heart failure (HCC)  Assessment & Plan  Wt Readings from Last 3 Encounters:   06/01/24 68 kg (149 lb 14.6 oz)   05/21/24 69.3 kg (152 lb 12.8 oz)   05/20/24 71.4 kg (157 lb 8 oz)     Last Echo in 2022 showed EF 60, grade 1 diastolic dysfunction.   Lungs clear on exam, no SOB. 3+ lower extremitiy edema on exam, consider likely medication side effect from Sotorasib.   Home Lasix recently increased to 40 mg daily prn for swelling likely causing ADITYA    Plan  S/p Gentle hydration for ADITYA  Home lasix was held on arrival. Now on Lasix 40 mg IV BID per nephrology.    Hyponatremia  Assessment & Plan  Recent Labs     06/01/24  1259 06/01/24  1745 06/02/24  0449   SODIUM 129* 127* 129*         Will consult nephrology given worsening  Etiology per nephro: Hyponatremia most likely due to SIADH as patient on  mirtazapine, Effexor and buspirone , PPI which could cause SIADH and hyponatremia.   Has underlying stage IV lung carcinoma which could cause SIADH.  Also has component of fluid overload   Lasix  40 mg IV BID  F/u urine and seru. Osm  S/p Tolvaptan x1     Paroxysmal atrial fibrillation (HCC)  Assessment & Plan  Continue home eliquis, sotalol. Metoprolol tartrate increased to 50 BID for better HR contol.            VTE Pharmacologic Prophylaxis: VTE Score: 7 High Risk (Score >/= 5) - Pharmacological DVT Prophylaxis Ordered: apixaban (Eliquis). Sequential Compression Devices Ordered.    Mobility:   Basic Mobility Inpatient Raw Score: 12  JH-HLM Goal: 4: Move to chair/commode  JH-HLM Achieved: 3: Sit at edge of bed  JH-HLM Goal NOT achieved. Continue with multidisciplinary rounding and encourage appropriate mobility to improve upon JH-HLM goals.    Patient Centered Rounds: I performed bedside rounds with nursing staff today.  Discussions with Specialists or Other Care Team Provider: Dr. Acevedo    Education and Discussions with Family / Patient: Updated  (daughter) via phone.    Current Length of Stay: 4 day(s)  Current Patient Status: Inpatient   Discharge Plan: Anticipate discharge in 24-48 hrs to rehab facility.    Code Status: Level 3 - DNAR and DNI    Subjective:   Pt seen at bedside. Denies any pain or discomfort. Seem somewhat forgetful, which reportedly is her baseline.     Objective:     Vitals:   Temp (24hrs), Av.4 °F (36.3 °C), Min:97.4 °F (36.3 °C), Max:97.4 °F (36.3 °C)    Temp:  [97.4 °F (36.3 °C)] 97.4 °F (36.3 °C)  HR:  [] 70  Resp:  [16-20] 16  BP: ()/(66-90) 127/90  SpO2:  [95 %-99 %] 99 %  Body mass index is 27.42 kg/m².     Input and Output Summary (last 24 hours):     Intake/Output Summary (Last 24 hours) at 2024 1103  Last data filed at 2024 0741  Gross per 24 hour   Intake 480 ml   Output 1250 ml   Net -770 ml       Physical Exam:   Physical Exam  Constitutional:       General: She is not in acute distress.     Appearance: She is not toxic-appearing.   HENT:      Head: Normocephalic.   Eyes:      Extraocular Movements: Extraocular movements  intact.      Conjunctiva/sclera: Conjunctivae normal.   Cardiovascular:      Rate and Rhythm: Normal rate.      Heart sounds: Normal heart sounds.   Pulmonary:      Effort: Pulmonary effort is normal.      Breath sounds: No wheezing, rhonchi or rales.   Abdominal:      Palpations: Abdomen is soft.      Tenderness: There is no abdominal tenderness. There is no guarding.   Musculoskeletal:      Right lower leg: Edema (2+) present.      Left lower leg: Edema (2+) present.   Skin:     General: Skin is warm and dry.   Neurological:      Mental Status: She is alert and oriented to person, place, and time. Mental status is at baseline.      Comments: Forgetful at baseline   Psychiatric:         Mood and Affect: Mood normal.         Behavior: Behavior normal.          Additional Data:     Labs:  Results from last 7 days   Lab Units 06/02/24  0449 06/01/24  0643 05/31/24  0429   WBC Thousand/uL 13.75* 13.79* 14.51*   HEMOGLOBIN g/dL 9.2* 9.3* 8.8*   HEMATOCRIT % 31.6* 31.8* 30.7*   PLATELETS Thousands/uL 388 381 376   BANDS PCT %  --   --  1   SEGS PCT %  --  89*  --    LYMPHO PCT %  --  5* 6*   MONO PCT %  --  5 6   EOS PCT %  --  0 0     Results from last 7 days   Lab Units 06/02/24  0449 05/28/24  1605 05/28/24  1602   SODIUM mmol/L 129*   < > 130*   POTASSIUM mmol/L 3.5   < > 4.8   CHLORIDE mmol/L 94*   < > 95*   CO2 mmol/L 24   < > 23   CO2, I-STAT   --    < >  --    BUN mg/dL 27*   < > 37*   CREATININE mg/dL 1.27   < > 1.57*   ANION GAP mmol/L 11   < > 12   CALCIUM mg/dL 9.1   < > 9.2   ALBUMIN g/dL  --   --  3.2*   TOTAL BILIRUBIN mg/dL  --   --  0.57   ALK PHOS U/L  --   --  95   ALT U/L  --   --  11   AST U/L  --   --  13   GLUCOSE RANDOM mg/dL 184*   < > 118    < > = values in this interval not displayed.         Results from last 7 days   Lab Units 06/02/24  0738 06/01/24  2114 06/01/24  1527 06/01/24  1144 06/01/24  0737 05/31/24  1538 05/31/24  1138 05/31/24  0724 05/30/24  2042 05/30/24  1529 05/30/24  1117  05/30/24  0745   POC GLUCOSE mg/dl 158* 166* 187* 179* 158* 176* 95 174* 187* 190* 235* 169*               Lines/Drains:  Invasive Devices       Central Venous Catheter Line  Duration             Port A Cath 12/14/21 Right Chest 901 days              Peripheral Intravenous Line  Duration             Peripheral IV 05/31/24 Right;Upper Arm 1 day                    Central Line:  Goal for removal:  chronic         Telemetry:  Telemetry Orders (From admission, onward)               24 Hour Telemetry Monitoring  Continuous x 24 Hours (Telem)        Expiring   Question:  Reason for 24 Hour Telemetry  Answer:  Arrhythmias requiring acute medical intervention / PPM or ICD malfunction                     Telemetry Reviewed:  intermittent afib/tachycardia  Indication for Continued Telemetry Use: Arrthymias requiring medical therapy             Imaging: Reviewed radiology reports from this admission including: chest xray, CT head, xray(s), and CT c-spine    Recent Cultures (last 7 days):         Last 24 Hours Medication List:   Current Facility-Administered Medications   Medication Dose Route Frequency Provider Last Rate    acetaminophen  975 mg Oral Q8H PRN Rito Sheridan, DO      apixaban  5 mg Oral BID Rito Sheridan DO      aspirin  81 mg Oral Daily Rito Sheridan, DO      atorvastatin  40 mg Oral Daily Rito Sheridan, DO      busPIRone  7.5 mg Oral HS Rito Sheridan DO      cefTRIAXone  1,000 mg Intravenous Q24H Rito Sheridan DO 1,000 mg (06/01/24 1253)    cyanocobalamin  100 mcg Oral Daily Rito Sheridan, DO      folic acid  1 mg Oral Daily Rito Sheridan, DO      furosemide  40 mg Intravenous BID (diuretic) Ki Forte MD      gabapentin  200 mg Oral BID Rito Sheridan DO      insulin lispro  1-5 Units Subcutaneous TID AC Rito Sheridan DO      insulin lispro  1-5 Units Subcutaneous HS Rito Sheridan DO      melatonin  3 mg Oral HS Marta Lazo MD      metoprolol tartrate  50 mg Oral Q12H Emmanuelle Velasquez MD      pantoprazole  40 mg  Oral Early Morning Rito Sheridan DO      polyethylene glycol  17 g Oral Daily Rito Sheridan DO      prochlorperazine  10 mg Oral TID PRN Rito Sheridan DO      senna  1 tablet Oral Daily Rito Sheridan DO      sodium bicarbonate  1,300 mg Oral BID after meals Ki Forte MD      sotalol  80 mg Oral Q12H [facility] Emmanuelle Velasquez MD      Sotorasib  960 mg Oral Daily Rito Sheridan DO      venlafaxine  100 mg Oral HS Marta Lazo MD          Today, Patient Was Seen By: Rito Sehridan DO    **Please Note: This note may have been constructed using a voice recognition system.**

## 2024-06-02 NOTE — RAPID RESPONSE
Rapid Response Note  Melany Griffith 73 y.o. female MRN: [id number]  Unit/Bed#: S -01 Encounter: [id number]    Rapid Response Notification(s):   Response called date/time:  6/2/2024 4:11 PM  Response team arrival date/time:  6/2/2024 4:12 PM  Response end date/time:  6/2/2024 4:25 PM  Level of care:  [facility] (S 313)  [facility]  Primary reason for rapid response call:  Fall    Rapid Response Intervention(s):   Airway:  None  Breathing:  None  Circulation:  None  Fluids administered:  None  Medications administered:  None  Comments:  C-Collar placed       Assessment:   Unwitnessed fall, on eliquis aspirin     Plan:   Stat CTH, C spine and thoracic spine  C collar placed during rapid, can remove if imaging negative  Fall precautions, place alarm  Neuro checks Q4H  Pt/Ot following  Ok to stay at current LOC     Rapid Response Outcome:   Transfer:  Remain on floor  Code Status: Level 3 (DNAR and DNI)      Family notified: No contact on file     Background/Situation:   Melany Griffith is a 73 y.o. female who was a rapid response after having an unwitnessed fall. Staff heard pt hit the floor from outside the room. Pt reports getting out of the chair to ambulate and fell due to weakness, denies dizziness prior to fall. (+) head strike on floor, (-) LOC. Pt on eliquis and asa.     Review of Systems   Musculoskeletal:         C/o pain in middle of the back on right side, 8/10. Says it was present prior to fall   Neurological:  Positive for weakness.        C/o generalized weakness. After fall says back of head pain 4/10. Denies dizziness   All other systems reviewed and are negative.      Objective:   Vitals:    06/02/24 1615 06/02/24 1618 06/02/24 1621 06/02/24 1624   BP:       BP Location:       Pulse: 81 80 78 73   Resp:       Temp:       TempSrc:       SpO2: 99% 99% 99% 99%   Weight:         Physical Exam  Constitutional:       General: She is not in acute distress.  HENT:      Head:  Normocephalic and atraumatic.      Mouth/Throat:      Mouth: Mucous membranes are moist.   Eyes:      Extraocular Movements: Extraocular movements intact.      Conjunctiva/sclera: Conjunctivae normal.      Pupils: Pupils are equal, round, and reactive to light.   Cardiovascular:      Rate and Rhythm: Normal rate.   Pulmonary:      Effort: Pulmonary effort is normal. No respiratory distress.      Breath sounds: Normal breath sounds.   Abdominal:      General: Bowel sounds are normal.      Palpations: Abdomen is soft.   Musculoskeletal:         General: Normal range of motion.      Cervical back: No tenderness.   Skin:     General: Skin is warm and dry.   Neurological:      General: No focal deficit present.      Mental Status: She is alert and oriented to person, place, and time. Mental status is at baseline.

## 2024-06-02 NOTE — ASSESSMENT & PLAN NOTE
Chronic but worsening recently. She does have a history of grade 1 diastolic dysfunction, but denies any recent SOB. Consider most likely secondary to Sotorasib use as this has been a reported side effect and pt does not appear to be in CHF exacerbation.     Plan  Apply compression stockings  B/L lower extremity doppler wnl, no clots  Continue Sotorasib for now  Lasix 40 mg IV BID per nephrology

## 2024-06-02 NOTE — PROGRESS NOTES
At 4:11 pm, a loud noise was heard coming from patient room. Primary nurse and a secondary nurse entered room and found patient lying on back along side bed. Patient stated that they got out of bed to get into chair and fell backwards and hit their head. A rapid was called. Provider examined patient and placed a c-collar on patient. Patient was placed on hover rayne and placed in bed. Patient was taken to ct for scans.

## 2024-06-02 NOTE — ASSESSMENT & PLAN NOTE
Malnutrition Findings:   Adult Malnutrition type: Chronic illness  Adult Degree of Malnutrition: Other severe protein calorie malnutrition  Malnutrition Characteristics: Weight loss, Inadequate energy, Muscle loss                  360 Statement: Chronic/severe malnutrition r/t decreased PO intake/condition, as evidenced by 9.5% wt loss x 3 months (5/21/24: 152#, 2/27/24: 168#),  intake meeting <75% of estimated needs for > 1 month, and severe muscle mass depletion (temporal region). Treatment: liberal PO diet + nutrition supplements    BMI Findings:           Body mass index is 27.42 kg/m².   Remeron 7.5 tonight for appetite, sleep and mood  D/c remeron given sedation. Consider restarting outpatient once renal function stable

## 2024-06-02 NOTE — ASSESSMENT & PLAN NOTE
Chronic but worsening recently. She does have a history of grade 1 diastolic dysfunction, but denies any recent SOB. Consider most likely secondary to Sotorasib use as this has been a reported side effect and pt does not appear to be in CHF exacerbation.     Plan  Apply compression stockings  B/L lower extremity doppler wnl, no clots  Continue Sotorasib for now  Lasix 40 mg IV BID switched to torsemide 20 mg twice daily

## 2024-06-02 NOTE — ASSESSMENT & PLAN NOTE
Lab Results   Component Value Date    EGFR 46 06/03/2024    EGFR 45 06/02/2024    EGFR 44 06/02/2024    CREATININE 1.16 06/03/2024    CREATININE 1.17 06/02/2024    CREATININE 1.19 06/02/2024       POA Cr 1.57. Baseline appears to be around 1. Consider most likely secondary to hypovolemia given that she endorses poor PO intake recently, as well as recent increase in prn lasix use for lower extremity swelling. Consider swelling most likely a medication side effect from Sotorasib.     Plan  S/p Gentle hydration NS  UA: moderate leuks, asymptomatic, will not treat at this time  Urine sodium: 17  Urine creatinine: 144, crt:albumin ratio of 25  Bladder scan/urinary retention protocol  Restarted home sotolol, on metoprolol.   Home ARB held at this time, restart as BP permits  Per family, patient on valsartan 160 mg DAILY, not bid as listed in med rec  Promote normotension, avoid nephrotoxins  Creatinine improved to 1.16 today with diuretics; likely to be cardiorenal per nephrology  Lasix switched to torsemide 20 mg BID, as recommended by nephrology

## 2024-06-02 NOTE — ASSESSMENT & PLAN NOTE
Meeting SIRS with HR 90s and WBC >12. Suspect urinary source given UA on admission. Patient unreliable and unable to give symptomatic history.   Treat UTI with ceftriaxone; 3 day course completed on 6/2   s/p IV hydration, blood pressures supported without further fluids  Trend WBC daily and vitals per unit routine

## 2024-06-02 NOTE — ASSESSMENT & PLAN NOTE
Recent Labs     06/02/24  1352 06/02/24 2055 06/03/24  0536   SODIUM 129* 129* 133*       Will consult nephrology given worsening  Etiology per nephro: Hyponatremia most likely due to SIADH as patient on  mirtazapine, Effexor and buspirone , PPI which could cause SIADH and hyponatremia.   Has underlying stage IV lung carcinoma which could cause SIADH.  Also has component of fluid overload   S/p Tolvaptan x2  Urine osmolality 162 after tolvaptan; Likely diluted  Sodium currently stable at 133    Plan:  Continue fluid restriction  IV furosemide transitioned to torsemide PO 20 mg twice daily  Monitor BMP

## 2024-06-02 NOTE — ASSESSMENT & PLAN NOTE
Lab Results   Component Value Date    EGFR 41 06/02/2024    EGFR 48 06/01/2024    EGFR 49 06/01/2024    CREATININE 1.27 06/02/2024    CREATININE 1.12 06/01/2024    CREATININE 1.09 06/01/2024       POA Cr 1.57. Baseline appears to be around 1. Consider most likely secondary to hypovolemia given that she endorses poor PO intake recently, as well as recent increase in prn lasix use for lower extremity swelling. Consider swelling most likely a medication side effect from Sotorasib.     Plan  S/p Gentle hydration NS  UA: moderate leuks, asymptomatic, will not treat at this time  Urine sodium: 17  Urine creatinine: 144, crt:albumin ratio of 25  Bladder scan/urinary retention protocol  Lasix 40 mg IV BID per nephrology  Restarted home sotolol, on metoprolol.   Home ARB held at this time, restart as BP permits  Per family, patient on valsartan 160 mg DAILY, not bid as listed in med rec  Promote normotension, avoid nephrotoxins

## 2024-06-02 NOTE — ASSESSMENT & PLAN NOTE
Meeting SIRS with HR 90s and WBC >12. Suspect urinary source given UA on admission. Patient unreliable and unable to give symptomatic history.   Treat UTI with ceftriaxone, plan for 3 d course complete 6/2  S/p IV hydration, blood pressures supported without further fluids  Trend WBC daily and vitals per unit routine

## 2024-06-02 NOTE — PROGRESS NOTES
NEPHROLOGY PROGRESS NOTE   Melany Griffith 73 y.o. female MRN: [id number]  Unit/Bed#: S -01 Encounter: [id number]    ASSESSMENT & PLAN:  a 73 y.o. female with history of stage IV lung carcinoma status post chemoradiation and immunotherapy, paroxysmal A-fib, heart failure with reduced ejection fraction, diabetes mellitus type 2, CVA who was admitted to [facility] after presenting with mechanical fall with trauma to the head   Hyponatremia  -Admission Sodium: 130 meq/L  -Baseline Sodium: Usually at normal range but had a sodium of 133 on May 8  -Work Up: Urine Na: 17, Urine Osmolality not checked, Serum Osmolality normal at 286 indicating possibility of pseudohyponatremia. also has normal total protein but low serum albumin  -Etiology: Hyponatremia most likely due to SIADH as patient on  mirtazapine, Effexor and buspirone , PPI which could cause SIADH and hyponatremia. Also Sotorasib could cause hyponatremia.  Has underlying stage IV lung carcinoma which could cause SIADH.  She likely also has a component of fluid overload with finding of lower extremity edema  -Hospital Course: Sodium level dropped to 127 meq/L on 6/1 when nephrology was consulted, she was given dose of salt tablet and started on sodium bicarbonate tablets after consultation.  Received a dose of tolvaptan 7.5 mg at 10 PM on 6/1  -Plan:   Reordered urine osmolality and urine sodium, check a.m. cortisol.  In the setting of normal serum osmolality also checking SPEP UPEP light chain ratio  Sodium level a.m. improved to 129 meq/L.  Recheck sodium level today afternoon and if sodium level not improving will give another dose of tolvaptan.  Continue fluid restriction 1.5 L/day.  Continue current dose of sodium bicarbonate tablet.  Continue diuresis with IV Lasix 40 mg twice daily    Metabolic acidosis: Bicarb level improved to 24 with use of sodium bicarbonate tablet which was started on 6/1.  Continue at current dose 1300 mg twice daily.   If bicarb level continues to trend up we will decrease the dose.  Also anion gap improved to 11  -Continue to monitor bicarb level.  Sodium bicarbonate tablet also to help with hyponatremia     Primary hypertension: Blood pressure is stable, continue current treatment with  losartan 50 mg daily and metoprolol 25 mg twice daily     Acute kidney injury, POA likely prerenal in the setting of renal failure from being on ARB  -UA with finding of 4-10 RBCs as well as WBCs and will need to recheck in future as outpatient    -Admission creatinine was 1.57 mg/dL and renal function  improved to creatinine 1.10 mg/dL on 6/1 but creatinine started trended up to 1.27 mg/dL today with use of IV Lasix, may have to accept higher creatinine with use of diuretics.  Continue current dose of IV Lasix  -Baseline creatinine has been around 0.9 to 1.1 mg/dL, continue to hold ARB.  Continue to avoid nephrotoxins continue to monitor     Bilateral lower extremity edema/fluid overload/chronic diastolic CHF: UA without any proteinuria.    Prior echo with ejection fraction 60% with grade 1 diastolic dysfunction  -Continue current dose of IV Lasix 40 mg twice daily.  Still with lower extremity edema recommend limb elevation     Anemia, unspecified  - hemoglobin 9.2 g/dL continue to monitor     Adenocarcinoma of lung stage IV   -status post chemoradiation and immunotherapy . currently on  Sotorasib      Ambulatory dysfunction: Management per primary team  Status post fall and complaining of pain right shoulder-management per primary team     Orders: Paroxysmal A-fib on anticoagulation as well as sotalol  Discussed with primary team regarding sodium level improving and plan to continue current dose of IV Lasix for lower extremity edema along with fluid restriction and sodium bicarbonate tablet for metabolic acidosis and agree with the plan    SUBJECTIVE:  Still with lower extremity edema, no chest pain or shortness of breath    OBJECTIVE:  Current  Weight: Weight - Scale: 68 kg (149 lb 14.6 oz)  Vitals:    06/02/24 0741   BP: 127/90   Pulse: 70   Resp: 16   Temp: (!) 97.4 °F (36.3 °C)   SpO2: 99%       Intake/Output Summary (Last 24 hours) at 6/2/2024 1112  Last data filed at 6/2/2024 0741  Gross per 24 hour   Intake 480 ml   Output 1250 ml   Net -770 ml       Physical Exam  General:  Ill looking, awake.  Eyes: Conjunctivae pink,  Sclera anicteric  ENT: lips and mucous membranes moist  Neck: supple   Chest: Clear to Auscultation both lungs,  no crackles, ronchus or wheezing.  CVS: S1 & S2 present, normal rate, regular rhythm, no murmur.  Abdomen: soft, non-tender, non-distended, Bowel sounds normoactive  Extremities: 1+ bilateral lower extremity edema  Skin: no rash  Neuro: awake, alert, oriented x 3   Psych: Mood and affect appropriate      Medications:    Current Facility-Administered Medications:     acetaminophen (TYLENOL) tablet 975 mg, 975 mg, Oral, Q8H PRN, Rito Sheridan DO, 975 mg at 06/01/24 2355    apixaban (ELIQUIS) tablet 5 mg, 5 mg, Oral, BID, Rito Sheridan DO, 5 mg at 06/02/24 0816    aspirin (ECOTRIN LOW STRENGTH) EC tablet 81 mg, 81 mg, Oral, Daily, Rito Sheridan DO, 81 mg at 06/02/24 0816    atorvastatin (LIPITOR) tablet 40 mg, 40 mg, Oral, Daily, Rito Sheridan DO, 40 mg at 06/02/24 0816    busPIRone (BUSPAR) tablet 7.5 mg, 7.5 mg, Oral, HS, Rito Sheridan DO, 7.5 mg at 06/01/24 2038    ceftriaxone (ROCEPHIN) 1 g/50 mL in dextrose IVPB, 1,000 mg, Intravenous, Q24H, Rito Sheridan DO, Last Rate: 100 mL/hr at 06/01/24 1253, 1,000 mg at 06/01/24 1253    cyanocobalamin (VITAMIN B-12) tablet 100 mcg, 100 mcg, Oral, Daily, Rito Sheridan DO, 100 mcg at 06/02/24 0816    folic acid (FOLVITE) tablet 1 mg, 1 mg, Oral, Daily, Rito Sheridan DO, 1 mg at 06/02/24 0816    furosemide (LASIX) injection 40 mg, 40 mg, Intravenous, BID (diuretic), Ki Forte MD, 40 mg at 06/02/24 0824    gabapentin (NEURONTIN) capsule 200 mg, 200 mg, Oral, BID, Rito Sheridan DO, 200 mg at 06/02/24  0815    insulin lispro (HumALOG/ADMELOG) 100 units/mL subcutaneous injection 1-5 Units, 1-5 Units, Subcutaneous, TID AC, 1 Units at 06/02/24 0821 **AND** Fingerstick Glucose (POCT), , , TID AC, Rito Sheridan DO    insulin lispro (HumALOG/ADMELOG) 100 units/mL subcutaneous injection 1-5 Units, 1-5 Units, Subcutaneous, HS, Rito Sheridan DO, 1 Units at 06/01/24 2235    melatonin tablet 3 mg, 3 mg, Oral, HS, Marta Lazo MD, 3 mg at 06/01/24 2037    metoprolol tartrate (LOPRESSOR) tablet 50 mg, 50 mg, Oral, Q12H, Emmanuelle Velasquez MD, 50 mg at 06/02/24 0816    pantoprazole (PROTONIX) EC tablet 40 mg, 40 mg, Oral, Early Morning, Rito Sheridan DO, 40 mg at 06/02/24 0648    polyethylene glycol (MIRALAX) packet 17 g, 17 g, Oral, Daily, Rito Sheridan DO, 17 g at 06/02/24 0831    prochlorperazine (COMPAZINE) tablet 10 mg, 10 mg, Oral, TID PRN, Rito Sheridan DO    senna (SENOKOT) tablet 8.6 mg, 1 tablet, Oral, Daily, Rito Sheridan DO, 8.6 mg at 06/02/24 0816    sodium bicarbonate tablet 1,300 mg, 1,300 mg, Oral, BID after meals, Ki Forte MD, 1,300 mg at 06/02/24 0816    sotalol (BETAPACE) tablet 80 mg, 80 mg, Oral, Q12H STEPHIE, Emmanuelle Velasquez MD, 80 mg at 06/02/24 0816    Sotorasib TABS 960 mg, 960 mg, Oral, Daily, Rito Sheridan DO, 960 mg at 06/02/24 0816    venlafaxine (EFFEXOR) tablet 100 mg, 100 mg, Oral, HS, Marta Lazo MD, 100 mg at 06/01/24 2107    Invasive Devices:        Lab Results:   Results from last 7 days   Lab Units 06/02/24  0449 06/01/24  1745 06/01/24  1259 06/01/24  0643 06/01/24  0610 05/31/24  0429 05/30/24  0534 05/29/24  0442 05/28/24  1606 05/28/24  1605 05/28/24  1605 05/28/24  1602   WBC Thousand/uL 13.75*  --   --  13.79*  --  14.51* 11.71*   < >  --   --   --  16.83*   HEMOGLOBIN g/dL 9.2*  --   --  9.3*  --  8.8* 9.3*   < >  --   --   --  9.9*   I STAT HEMOGLOBIN g/dl  --   --   --   --   --   --   --   --  10.2*   < > 11.2*  --    HEMATOCRIT % 31.6*  --   --  31.8*  --   "30.7* 32.4*   < >  --   --   --  33.9*   HEMATOCRIT, ISTAT %  --   --   --   --   --   --   --   --  30*   < > 33*  --    PLATELETS Thousands/uL 388  --   --  381  --  376 390   < >  --   --   --  456*   POTASSIUM mmol/L 3.5 4.4 4.2  --    < > 3.6 3.6   < >  --   --   --  4.8   CHLORIDE mmol/L 94* 96 96  --    < > 97 97   < >  --   --   --  95*   CO2 mmol/L 24 18* 24  --    < > 24 24   < >  --   --   --  23   CO2, I-STAT mmol/L  --   --   --   --   --   --   --   --  25   < > 25  --    BUN mg/dL 27* 25 25  --    < > 30* 39*   < >  --   --   --  37*   CREATININE mg/dL 1.27 1.12 1.09  --    < > 1.17 1.51*   < >  --   --   --  1.57*   CALCIUM mg/dL 9.1 8.7 9.3  --    < > 8.9 9.0   < >  --   --   --  9.2   MAGNESIUM mg/dL  --   --   --   --   --  2.2 1.6*  --   --   --   --   --    ALK PHOS U/L  --   --   --   --   --   --   --   --   --   --   --  95   ALT U/L  --   --   --   --   --   --   --   --   --   --   --  11   AST U/L  --   --   --   --   --   --   --   --   --   --   --  13   GLUCOSE, ISTAT mg/dl  --   --   --   --   --   --   --   --  117  --  118  --     < > = values in this interval not displayed.       Previous work up:         Portions of the record may have been created with voice recognition software. Occasional wrong word or \"sound a like\" substitutions may have occurred due to the inherent limitations of voice recognition software. Read the chart carefully and recognize, using context, where substitutions have occurred.If you have any questions, please contact the dictating provider.    "

## 2024-06-02 NOTE — ASSESSMENT & PLAN NOTE
Wt Readings from Last 3 Encounters:   06/01/24 68 kg (149 lb 14.6 oz)   05/21/24 69.3 kg (152 lb 12.8 oz)   05/20/24 71.4 kg (157 lb 8 oz)     Last Echo in 2022 showed EF 60, grade 1 diastolic dysfunction.   Lungs clear on exam, no SOB. 3+ lower extremitiy edema on exam, consider likely medication side effect from Sotorasib.   Home Lasix recently increased to 40 mg daily prn for swelling likely causing ADITYA  S/p Gentle hydration for ADITYA  Continues to have bilateral lower extremity edema; chest clear to auscultation.  No concerns for volume overload this time    Plan:  Lasix switched to torsemide 20 mg twice daily

## 2024-06-02 NOTE — ASSESSMENT & PLAN NOTE
Lab Results   Component Value Date    HGBA1C 7.2 (H) 05/08/2024       Recent Labs     06/01/24  1144 06/01/24  1527 06/01/24  2114 06/02/24  0738   POCGLU 179* 187* 166* 158*         Blood Sugar Average: Last 72 hrs:  (P) [id number]    Hold home medications. SSI algorithm 1, titrate as needed.

## 2024-06-03 ENCOUNTER — APPOINTMENT (INPATIENT)
Dept: CT IMAGING | Facility: HOSPITAL | Age: 74
DRG: 682 | End: 2024-06-03
Payer: MEDICARE

## 2024-06-03 ENCOUNTER — APPOINTMENT (INPATIENT)
Dept: RADIOLOGY | Facility: HOSPITAL | Age: 74
DRG: 682 | End: 2024-06-03
Payer: MEDICARE

## 2024-06-03 LAB
ANION GAP SERPL CALCULATED.3IONS-SCNC: 9 MMOL/L (ref 4–13)
BASOPHILS # BLD AUTO: 0.04 THOUSANDS/ÂΜL (ref 0–0.1)
BASOPHILS NFR BLD AUTO: 0 % (ref 0–1)
BUN SERPL-MCNC: 24 MG/DL (ref 5–25)
CALCIUM SERPL-MCNC: 9.2 MG/DL (ref 8.4–10.2)
CHLORIDE SERPL-SCNC: 96 MMOL/L (ref 96–108)
CO2 SERPL-SCNC: 28 MMOL/L (ref 21–32)
CORTIS AM PEAK SERPL-MCNC: 32.9 UG/DL (ref 6.7–22.6)
CREAT SERPL-MCNC: 1.16 MG/DL (ref 0.6–1.3)
EOSINOPHIL # BLD AUTO: 0.03 THOUSAND/ÂΜL (ref 0–0.61)
EOSINOPHIL NFR BLD AUTO: 0 % (ref 0–6)
ERYTHROCYTE [DISTWIDTH] IN BLOOD BY AUTOMATED COUNT: 19.6 % (ref 11.6–15.1)
GFR SERPL CREATININE-BSD FRML MDRD: 46 ML/MIN/1.73SQ M
GLUCOSE SERPL-MCNC: 170 MG/DL (ref 65–140)
GLUCOSE SERPL-MCNC: 177 MG/DL (ref 65–140)
GLUCOSE SERPL-MCNC: 200 MG/DL (ref 65–140)
GLUCOSE SERPL-MCNC: 203 MG/DL (ref 65–140)
GLUCOSE SERPL-MCNC: 216 MG/DL (ref 65–140)
HCT VFR BLD AUTO: 33.4 % (ref 34.8–46.1)
HGB BLD-MCNC: 9.8 G/DL (ref 11.5–15.4)
IMM GRANULOCYTES # BLD AUTO: 0.11 THOUSAND/UL (ref 0–0.2)
IMM GRANULOCYTES NFR BLD AUTO: 1 % (ref 0–2)
LYMPHOCYTES # BLD AUTO: 0.67 THOUSANDS/ÂΜL (ref 0.6–4.47)
LYMPHOCYTES NFR BLD AUTO: 5 % (ref 14–44)
MCH RBC QN AUTO: 22.5 PG (ref 26.8–34.3)
MCHC RBC AUTO-ENTMCNC: 29.3 G/DL (ref 31.4–37.4)
MCV RBC AUTO: 77 FL (ref 82–98)
MONOCYTES # BLD AUTO: 0.8 THOUSAND/ÂΜL (ref 0.17–1.22)
MONOCYTES NFR BLD AUTO: 5 % (ref 4–12)
NEUTROPHILS # BLD AUTO: 13.32 THOUSANDS/ÂΜL (ref 1.85–7.62)
NEUTS SEG NFR BLD AUTO: 89 % (ref 43–75)
NRBC BLD AUTO-RTO: 0 /100 WBCS
PLATELET # BLD AUTO: 371 THOUSANDS/UL (ref 149–390)
PMV BLD AUTO: 10.2 FL (ref 8.9–12.7)
POTASSIUM SERPL-SCNC: 3.8 MMOL/L (ref 3.5–5.3)
RBC # BLD AUTO: 4.35 MILLION/UL (ref 3.81–5.12)
SODIUM SERPL-SCNC: 133 MMOL/L (ref 135–147)
WBC # BLD AUTO: 14.97 THOUSAND/UL (ref 4.31–10.16)

## 2024-06-03 PROCEDURE — 86334 IMMUNOFIX E-PHORESIS SERUM: CPT | Performed by: INTERNAL MEDICINE

## 2024-06-03 PROCEDURE — 83521 IG LIGHT CHAINS FREE EACH: CPT | Performed by: INTERNAL MEDICINE

## 2024-06-03 PROCEDURE — 84165 PROTEIN E-PHORESIS SERUM: CPT | Performed by: INTERNAL MEDICINE

## 2024-06-03 PROCEDURE — 97530 THERAPEUTIC ACTIVITIES: CPT

## 2024-06-03 PROCEDURE — 97168 OT RE-EVAL EST PLAN CARE: CPT

## 2024-06-03 PROCEDURE — 99223 1ST HOSP IP/OBS HIGH 75: CPT | Performed by: PSYCHIATRY & NEUROLOGY

## 2024-06-03 PROCEDURE — 70450 CT HEAD/BRAIN W/O DYE: CPT

## 2024-06-03 PROCEDURE — 97164 PT RE-EVAL EST PLAN CARE: CPT

## 2024-06-03 PROCEDURE — 85025 COMPLETE CBC W/AUTO DIFF WBC: CPT

## 2024-06-03 PROCEDURE — 73501 X-RAY EXAM HIP UNI 1 VIEW: CPT

## 2024-06-03 PROCEDURE — 82948 REAGENT STRIP/BLOOD GLUCOSE: CPT

## 2024-06-03 PROCEDURE — 99232 SBSQ HOSP IP/OBS MODERATE 35: CPT | Performed by: INTERNAL MEDICINE

## 2024-06-03 PROCEDURE — 80048 BASIC METABOLIC PNL TOTAL CA: CPT | Performed by: INTERNAL MEDICINE

## 2024-06-03 PROCEDURE — 73560 X-RAY EXAM OF KNEE 1 OR 2: CPT

## 2024-06-03 PROCEDURE — 82533 TOTAL CORTISOL: CPT | Performed by: INTERNAL MEDICINE

## 2024-06-03 RX ORDER — OXYCODONE HYDROCHLORIDE 5 MG/1
5 TABLET ORAL EVERY 4 HOURS PRN
Status: DISCONTINUED | OUTPATIENT
Start: 2024-06-03 | End: 2024-06-08 | Stop reason: HOSPADM

## 2024-06-03 RX ORDER — SODIUM BICARBONATE 650 MG/1
650 TABLET ORAL
Status: DISCONTINUED | OUTPATIENT
Start: 2024-06-03 | End: 2024-06-04

## 2024-06-03 RX ORDER — TORSEMIDE 20 MG/1
20 TABLET ORAL 2 TIMES DAILY
Status: DISCONTINUED | OUTPATIENT
Start: 2024-06-03 | End: 2024-06-05

## 2024-06-03 RX ADMIN — INSULIN LISPRO 1 UNITS: 100 INJECTION, SOLUTION INTRAVENOUS; SUBCUTANEOUS at 08:06

## 2024-06-03 RX ADMIN — INSULIN LISPRO 1 UNITS: 100 INJECTION, SOLUTION INTRAVENOUS; SUBCUTANEOUS at 21:57

## 2024-06-03 RX ADMIN — Medication 3 MG: at 21:55

## 2024-06-03 RX ADMIN — ATORVASTATIN CALCIUM 40 MG: 40 TABLET, FILM COATED ORAL at 08:04

## 2024-06-03 RX ADMIN — VENLAFAXINE 100 MG: 25 TABLET ORAL at 21:56

## 2024-06-03 RX ADMIN — APIXABAN 5 MG: 5 TABLET, FILM COATED ORAL at 17:28

## 2024-06-03 RX ADMIN — SOTALOL HYDROCHLORIDE 80 MG: 80 TABLET ORAL at 21:55

## 2024-06-03 RX ADMIN — SODIUM BICARBONATE 1300 MG: 650 TABLET ORAL at 08:04

## 2024-06-03 RX ADMIN — SOTORASIB 960 MG: 120 TABLET, COATED ORAL at 08:06

## 2024-06-03 RX ADMIN — INSULIN LISPRO 1 UNITS: 100 INJECTION, SOLUTION INTRAVENOUS; SUBCUTANEOUS at 12:14

## 2024-06-03 RX ADMIN — BUSPIRONE HYDROCHLORIDE 7.5 MG: 5 TABLET ORAL at 21:55

## 2024-06-03 RX ADMIN — PANTOPRAZOLE SODIUM 40 MG: 40 TABLET, DELAYED RELEASE ORAL at 05:31

## 2024-06-03 RX ADMIN — GABAPENTIN 200 MG: 100 CAPSULE ORAL at 17:28

## 2024-06-03 RX ADMIN — TORSEMIDE 20 MG: 20 TABLET ORAL at 20:25

## 2024-06-03 RX ADMIN — GABAPENTIN 200 MG: 100 CAPSULE ORAL at 08:04

## 2024-06-03 RX ADMIN — FOLIC ACID 1 MG: 1 TABLET ORAL at 08:05

## 2024-06-03 RX ADMIN — FUROSEMIDE 40 MG: 10 INJECTION, SOLUTION INTRAMUSCULAR; INTRAVENOUS at 08:04

## 2024-06-03 RX ADMIN — APIXABAN 5 MG: 5 TABLET, FILM COATED ORAL at 08:04

## 2024-06-03 RX ADMIN — METOPROLOL TARTRATE 50 MG: 50 TABLET, FILM COATED ORAL at 08:03

## 2024-06-03 RX ADMIN — TORSEMIDE 20 MG: 20 TABLET ORAL at 12:14

## 2024-06-03 RX ADMIN — ASPIRIN 81 MG: 81 TABLET, COATED ORAL at 08:05

## 2024-06-03 RX ADMIN — SENNOSIDES 8.6 MG: 8.6 TABLET, FILM COATED ORAL at 08:04

## 2024-06-03 RX ADMIN — SODIUM BICARBONATE 650 MG: 650 TABLET ORAL at 17:28

## 2024-06-03 RX ADMIN — SOTALOL HYDROCHLORIDE 80 MG: 80 TABLET ORAL at 08:04

## 2024-06-03 RX ADMIN — METOPROLOL TARTRATE 50 MG: 50 TABLET, FILM COATED ORAL at 20:21

## 2024-06-03 RX ADMIN — VITAM B12 100 MCG: 100 TAB at 08:04

## 2024-06-03 NOTE — PROGRESS NOTES
VTE Pharmacologic Prophylaxis: VTE Score: 7 {VTE Risk:21453}    Mobility:   Basic Mobility Inpatient Raw Score: 12  -[facility] Goal: 4: Move to chair/commode  -[facility] Achieved: 2: Bed activities/Dependent transfer  {Mobility:85874}    Patient Centered Rounds: {Pt Centered Care Rounds:10746}  Discussions with Specialists or Other Care Team Provider: ***    Education and Discussions with Family / Patient: {Family Communication:05780}    Current Length of Stay: 5 day(s)  Current Patient Status: Inpatient   Discharge Plan: {Discharge Plan:62869}    Code Status: Level 3 - DNAR and DNI    Subjective:   Patient evaluated at bedside.  She reported that her back has been hurting since her fall yesterday night.  Denies headache, lightheadedness, dizziness, shortness of breath, chest pain.    Objective:     Vitals:   Temp (24hrs), Av.6 °F (36.4 °C), Min:97.6 °F (36.4 °C), Max:97.6 °F (36.4 °C)    Temp:  [97.6 °F (36.4 °C)] 97.6 °F (36.4 °C)  HR:  [71-90] 83  Resp:  [16-18] 16  BP: (108-131)/() 130/100  SpO2:  [97 %-99 %] 97 %  Body mass index is 27.38 kg/m².     Input and Output Summary (last 24 hours):     Intake/Output Summary (Last 24 hours) at 6/3/2024 0918  Last data filed at 6/3/2024 0544  Gross per 24 hour   Intake 640 ml   Output 900 ml   Net -260 ml       Physical Exam:   Physical Exam ***    Additional Data:     Labs:  Results from last 7 days   Lab Units 24  0536 24  0643 24  0429   WBC Thousand/uL 14.97*   < > 14.51*   HEMOGLOBIN g/dL 9.8*   < > 8.8*   HEMATOCRIT % 33.4*   < > 30.7*   PLATELETS Thousands/uL 371   < > 376   BANDS PCT %  --   --  1   SEGS PCT % 89*   < >  --    LYMPHO PCT % 5*   < > 6*   MONO PCT % 5   < > 6   EOS PCT % 0   < > 0    < > = values in this interval not displayed.     Results from last 7 days   Lab Units 24  0536 24  1605 24  1602   SODIUM mmol/L 133*   < > 130*   POTASSIUM mmol/L 3.8   < > 4.8   CHLORIDE mmol/L 96   < > 95*   CO2 mmol/L 28   <  > 23   CO2, I-STAT   --    < >  --    BUN mg/dL 24   < > 37*   CREATININE mg/dL 1.16   < > 1.57*   ANION GAP mmol/L 9   < > 12   CALCIUM mg/dL 9.2   < > 9.2   ALBUMIN g/dL  --   --  3.2*   TOTAL BILIRUBIN mg/dL  --   --  0.57   ALK PHOS U/L  --   --  95   ALT U/L  --   --  11   AST U/L  --   --  13   GLUCOSE RANDOM mg/dL 200*   < > 118    < > = values in this interval not displayed.         Results from last 7 days   Lab Units 06/03/24  0712 06/02/24  2116 06/02/24  1614 06/02/24  1117 06/02/24  0738 06/01/24  2114 06/01/24  1527 06/01/24  1144 06/01/24  0737 05/31/24  1538 05/31/24  1138 05/31/24  0724   POC GLUCOSE mg/dl 216* 235* 175* 202* 158* 166* 187* 179* 158* 176* 95 174*               Lines/Drains:  Invasive Devices       Central Venous Catheter Line  Duration             Port A Cath 12/14/21 Right Chest 901 days              Peripheral Intravenous Line  Duration             Peripheral IV 06/02/24 Right Antecubital <1 day                    Central Line:  Goal for removal: {Central Line Removal Goal:83962}         Telemetry:  Telemetry Orders (From admission, onward)               24 Hour Telemetry Monitoring  Continuous x 24 Hours (Telem)        Question:  Reason for 24 Hour Telemetry  Answer:  Arrhythmias requiring acute medical intervention / PPM or ICD malfunction                     Telemetry Reviewed: {JMD Tele Review:92284}  Indication for Continued Telemetry Use: {Tele Indications:14891}             Imaging: {Radiology Review:75552}    Recent Cultures (last 7 days):         Last 24 Hours Medication List:   Current Facility-Administered Medications   Medication Dose Route Frequency Provider Last Rate    acetaminophen  975 mg Oral Q8H PRN Rito Sheridan, DO      apixaban  5 mg Oral BID Rito Sheridan, DO      aspirin  81 mg Oral Daily Rito Sheridan, DO      atorvastatin  40 mg Oral Daily Rito Sheridan, DO      busPIRone  7.5 mg Oral HS Rito Sheridan, DO      cyanocobalamin  100 mcg Oral Daily Rito Sheridan, DO      folic  acid  1 mg Oral Daily Rito Granadojeremi, DO      furosemide  40 mg Intravenous BID (diuretic) Ki Forte MD      gabapentin  200 mg Oral BID Rito Fatimah, DO      insulin lispro  1-5 Units Subcutaneous TID AC Rito Fatimah, DO      insulin lispro  1-5 Units Subcutaneous HS Rito Fatimah, DO      melatonin  3 mg Oral HS Marta Lazo MD      metoprolol tartrate  50 mg Oral Q12H Emmanuelle Velasquez MD      pantoprazole  40 mg Oral Early Morning Rito Granadojermei, DO      polyethylene glycol  17 g Oral Daily Rito Fatimah, DO      prochlorperazine  10 mg Oral TID PRN Rito Fatimah, DO      senna  1 tablet Oral Daily Rito Sheridan, DO      sodium bicarbonate  650 mg Oral BID after meals Shaun Banegas MD      sotalol  80 mg Oral Q12H STEPHIE Emmanuelle Velasquez MD      Sotorasib  960 mg Oral Daily Rito Sheridan, DO      venlafaxine  100 mg Oral HS Marta Lazo MD          Today, Patient Was Seen By: Jeana Edwards MD    **Please Note: This note may have been constructed using a voice recognition system.**[facility]  Progress Note  Name: Melany Griffith I  MRN: [id number]  Unit/Bed#: S -01 I Date of Admission: 5/28/2024   Date of Service: 6/3/2024 I Hospital Day: 5    Assessment & Plan   Sepsis (HCC)  Assessment & Plan  Meeting SIRS with HR 90s and WBC >12. Suspect urinary source given UA on admission. Patient unreliable and unable to give symptomatic history.   Treat UTI with ceftriaxone, plan for 3 d course complete 6/2  S/p IV hydration, blood pressures supported without further fluids  Trend WBC daily and vitals per unit routine    Severe protein-calorie malnutrition (HCC)  Assessment & Plan  Malnutrition Findings:   Adult Malnutrition type: Chronic illness  Adult Degree of Malnutrition: Other severe protein calorie malnutrition  Malnutrition Characteristics: Weight loss, Inadequate energy, Muscle loss                  360 Statement: Chronic/severe malnutrition r/t decreased  PO intake/condition, as evidenced by 9.5% wt loss x 3 months (5/21/24: 152#, 2/27/24: 168#),  intake meeting <75% of estimated needs for > 1 month, and severe muscle mass depletion (temporal region). Treatment: liberal PO diet + nutrition supplements    BMI Findings:           Body mass index is 27.42 kg/m².   Remeron 7.5 tonight for appetite, sleep and mood  D/c remeron given sedation. Consider restarting outpatient once renal function stable    Sacral wound  Assessment & Plan  Small wound present in the R sacral/buttock area. Small amount of scabbing present, no current discharge. Low suspicion of infection.     Plan  Wound care consult  No need for abx at this time    Acute pain of right shoulder  Assessment & Plan  S/p fall. Trauma XR's all negative. Tylenol 975 prn.     Ambulatory dysfunction  Assessment & Plan  Recent increased weakness with several recent falls. She endorses significant weight loss and poor appetite over the past 2 months. Lives at home, occasionally uses cane or walker but generally does not. Orthostatic vitals negative.    Plan  PT/OT - recommended inpatient rehab, patient amenable to going  Fall precautions  Increased risk due to sedation 2/2 gabapentin and remeron  D/c remeron for now  Consider restarting when renal function is back to baseline and sedation has decreased  Nutrition consult      Fall  Assessment & Plan  S/p mechanical fall with no LOC. On eliquis. Sustained hit to back of the head and R shoulder. Trauma workup negative. Not currently in any pain.     Plan  Tylenol prn for pain control  PT/OT recommending PAR, which patient agrees is a good idea  Fall precautions    Constipation  Assessment & Plan  She notes ongoing issues with constipation at home. Miralax and Senna both ordered daily. Increase/reduce as needed.     Lower extremity edema  Assessment & Plan  Chronic but worsening recently. She does have a history of grade 1 diastolic dysfunction, but denies any recent SOB.  Consider most likely secondary to Sotorasib use as this has been a reported side effect and pt does not appear to be in CHF exacerbation.     Plan  Apply compression stockings  B/L lower extremity doppler wnl, no clots  Continue Sotorasib for now  Lasix 40 mg IV BID per nephrology    Type 2 diabetes mellitus with stage 3 chronic kidney disease, without long-term current use of insulin, unspecified whether stage 3a or 3b CKD (HCC)  Assessment & Plan  Lab Results   Component Value Date    HGBA1C 7.2 (H) 05/08/2024       Recent Labs     06/01/24  1144 06/01/24  1527 06/01/24  2114 06/02/24  0738   POCGLU 179* 187* 166* 158*         Blood Sugar Average: Last 72 hrs:  (P) [id number]    Hold home medications. SSI algorithm 1, titrate as needed.     Chronic diastolic heart failure (HCC)  Assessment & Plan  Wt Readings from Last 3 Encounters:   06/01/24 68 kg (149 lb 14.6 oz)   05/21/24 69.3 kg (152 lb 12.8 oz)   05/20/24 71.4 kg (157 lb 8 oz)     Last Echo in 2022 showed EF 60, grade 1 diastolic dysfunction.   Lungs clear on exam, no SOB. 3+ lower extremitiy edema on exam, consider likely medication side effect from Sotorasib.   Home Lasix recently increased to 40 mg daily prn for swelling likely causing ADITYA    Plan  S/p Gentle hydration for ADITYA  Home lasix was held on arrival. Now on Lasix 40 mg IV BID per nephrology.    Hyponatremia  Assessment & Plan  Recent Labs     06/01/24  1259 06/01/24  1745 06/02/24  0449   SODIUM 129* 127* 129*         Will consult nephrology given worsening  Etiology per nephro: Hyponatremia most likely due to SIADH as patient on  mirtazapine, Effexor and buspirone , PPI which could cause SIADH and hyponatremia.   Has underlying stage IV lung carcinoma which could cause SIADH.  Also has component of fluid overload   Lasix 40 mg IV BID  F/u urine and seru. Osm  S/p Tolvaptan x1 6/1    Adenocarcinoma of lung, stage 4 (HCC)  Assessment & Plan  S/p chemo, radiation, and  immunotherapy. Follows w/ Dr. Sam outpatient. Currently on Sotorasib only.     Plan  Will provide sotorasib from home    Paroxysmal atrial fibrillation (HCC)  Assessment & Plan  Continue home eliquis, sotalol. Metoprolol tartrate increased to 50 BID for better HR contol.     * ADITYA on CKD  Assessment & Plan  Lab Results   Component Value Date    EGFR 41 06/02/2024    EGFR 48 06/01/2024    EGFR 49 06/01/2024    CREATININE 1.27 06/02/2024    CREATININE 1.12 06/01/2024    CREATININE 1.09 06/01/2024       POA Cr 1.57. Baseline appears to be around 1. Consider most likely secondary to hypovolemia given that she endorses poor PO intake recently, as well as recent increase in prn lasix use for lower extremity swelling. Consider swelling most likely a medication side effect from Sotorasib.     Plan  S/p Gentle hydration NS  UA: moderate leuks, asymptomatic, will not treat at this time  Urine sodium: 17  Urine creatinine: 144, crt:albumin ratio of 25  Bladder scan/urinary retention protocol  Lasix 40 mg IV BID per nephrology  Restarted home sotolol, on metoprolol.   Home ARB held at this time, restart as BP permits  Per family, patient on valsartan 160 mg DAILY, not bid as listed in med rec  Promote normotension, avoid nephrotoxins             {Progress note PORCH templates:57241}

## 2024-06-03 NOTE — PROGRESS NOTES
NEPHROLOGY HOSPITAL PROGRESS NOTE   Melany Griffith 73 y.o. female MRN: [id number]  Unit/Bed#: S -01 Encounter: [id number]  Reason for Consult: Hyponatremia    ASSESSMENT and PLAN:  23-year-old female with history of stage IV lung carcinoma status post chemoradiation and immunotherapy, paroxysmal A-fib, heart failure with reduced ejection fraction, diabetes mellitus type 2, CVA presented with mechanical fall.  We are consulted for management of hyponatremia.    1.  Hyponatremia.  Admission sodium 130.  Baseline serum sodium normal range.  Serum osmolality 286 (?  Isoosmolar). SPEP/UPEP/serum free light chain ratio pending.  Urine osmolality 162 (after tolvaptan dose x2).  Urine sodium less than 10.  Consider SIADH as the likely etiology (Effexor, BuSpar use).  Serum sodium level improved to 133 today (corrected 134-135).  Continue to monitor serum sodium with fluid restriction and ongoing diuretic use.  Trend BMP daily.    2.  ADITYA.  Admission creatinine 1.57.  Creatinine today improved to 1.16.  UA with 4-10 RBC.  Etiology most likely cardiorenal syndrome.  Creatinine improving with diuretics.  Continue IV diuretics as below.    3.  Bilateral lower extremity edema/fluid overload/chronic diastolic CHF.  Prior echocardiogram with EF 60%, G1 DD.  Currently receiving IV Lasix 40 mg twice daily, transition to oral torsemide 20 mg twice daily    4.  Metabolic acidosis.  Serum bicarbonate level today 28.  Currently receiving sodium bicarbonate 100 mg twice daily.  Decrease sodium bicarbonate to 650 mg twice daily.    Discussed with family medicine team.  After discussion, we agreed that sodium level is currently stable and to transition to oral diuretics today for ongoing management of hyponatremia and volume overload.     We will sign off at this stage and follow peripherally.  Please call with questions.     SUBJECTIVE / 24H INTERVAL HISTORY:  Urine output recorded as 1100 cc.  Denies dyspnea.  Continues to have  leg swelling.    OBJECTIVE:  Current Weight: Weight - Scale: 67.9 kg (149 lb 11.1 oz)  Vitals:    06/02/24 2050 06/02/24 2212 06/03/24 0544 06/03/24 0711   BP: 108/70 121/87  130/100   BP Location:    Right arm   Pulse: 90   83   Resp:    16   Temp:    97.6 °F (36.4 °C)   TempSrc:    Axillary   SpO2: 97%   97%   Weight:   67.9 kg (149 lb 11.1 oz)        Intake/Output Summary (Last 24 hours) at 6/3/2024 0844  Last data filed at 6/3/2024 0544  Gross per 24 hour   Intake 760 ml   Output 900 ml   Net -140 ml     Review of Systems   Constitutional:  Negative for chills and fever.   HENT:  Negative for ear pain and sore throat.    Eyes:  Negative for pain and visual disturbance.   Respiratory:  Negative for cough and shortness of breath.    Cardiovascular:  Positive for leg swelling. Negative for chest pain and palpitations.   Gastrointestinal:  Negative for abdominal pain and vomiting.   Genitourinary:  Negative for dysuria and hematuria.   Musculoskeletal:  Negative for arthralgias and back pain.   Skin:  Negative for color change and rash.   Neurological:  Negative for seizures and syncope.   All other systems reviewed and are negative.    Physical Exam  Vitals and nursing note reviewed.   Constitutional:       General: She is not in acute distress.     Appearance: She is well-developed.   HENT:      Head: Normocephalic and atraumatic.   Eyes:      Conjunctiva/sclera: Conjunctivae normal.   Cardiovascular:      Rate and Rhythm: Normal rate and regular rhythm.      Pulses: Normal pulses.      Heart sounds: Normal heart sounds. No murmur heard.  Pulmonary:      Effort: Pulmonary effort is normal. No respiratory distress.      Breath sounds: Normal breath sounds.   Abdominal:      Palpations: Abdomen is soft.      Tenderness: There is no abdominal tenderness.   Musculoskeletal:         General: No swelling.      Cervical back: Neck supple.      Right lower leg: Edema present.      Left lower leg: Edema present.   Skin:      General: Skin is warm and dry.      Capillary Refill: Capillary refill takes less than 2 seconds.   Neurological:      Mental Status: She is alert.   Psychiatric:         Mood and Affect: Mood normal.       Medications:    Current Facility-Administered Medications:     acetaminophen (TYLENOL) tablet 975 mg, 975 mg, Oral, Q8H PRN, Rito Sheridan DO, 975 mg at 06/01/24 2355    apixaban (ELIQUIS) tablet 5 mg, 5 mg, Oral, BID, Rito Sheridan DO, 5 mg at 06/03/24 0804    aspirin (ECOTRIN LOW STRENGTH) EC tablet 81 mg, 81 mg, Oral, Daily, Rito Sheridan DO, 81 mg at 06/03/24 0805    atorvastatin (LIPITOR) tablet 40 mg, 40 mg, Oral, Daily, Rito Sheridan DO, 40 mg at 06/03/24 0804    busPIRone (BUSPAR) tablet 7.5 mg, 7.5 mg, Oral, HS, Rito Sheridan DO, 7.5 mg at 06/02/24 2213    cyanocobalamin (VITAMIN B-12) tablet 100 mcg, 100 mcg, Oral, Daily, Rito Sheridan DO, 100 mcg at 06/03/24 0804    folic acid (FOLVITE) tablet 1 mg, 1 mg, Oral, Daily, Rito Sheridan DO, 1 mg at 06/03/24 0805    furosemide (LASIX) injection 40 mg, 40 mg, Intravenous, BID (diuretic), Ki Forte MD, 40 mg at 06/03/24 0804    gabapentin (NEURONTIN) capsule 200 mg, 200 mg, Oral, BID, Rito Sheridan DO, 200 mg at 06/03/24 0804    insulin lispro (HumALOG/ADMELOG) 100 units/mL subcutaneous injection 1-5 Units, 1-5 Units, Subcutaneous, TID AC, 1 Units at 06/03/24 0806 **AND** Fingerstick Glucose (POCT), , , TID AC, Rito Sheridan DO    insulin lispro (HumALOG/ADMELOG) 100 units/mL subcutaneous injection 1-5 Units, 1-5 Units, Subcutaneous, HS, Rito Sheridan DO, 2 Units at 06/02/24 2214    melatonin tablet 3 mg, 3 mg, Oral, HS, Marta Lazo MD, 3 mg at 06/02/24 2213    metoprolol tartrate (LOPRESSOR) tablet 50 mg, 50 mg, Oral, Q12H, Emmanuelle Velasquez MD, 50 mg at 06/03/24 0803    pantoprazole (PROTONIX) EC tablet 40 mg, 40 mg, Oral, Early Morning, Rito Sheridan DO, 40 mg at 06/03/24 0531    polyethylene glycol (MIRALAX) packet 17 g, 17 g, Oral, Daily, Rito Sheridan,  , 17 g at 06/02/24 0831    prochlorperazine (COMPAZINE) tablet 10 mg, 10 mg, Oral, TID PRN, Rito Sheridan DO    senna (SENOKOT) tablet 8.6 mg, 1 tablet, Oral, Daily, Rito Sheridan DO, 8.6 mg at 06/03/24 0804    sodium bicarbonate tablet 1,300 mg, 1,300 mg, Oral, BID after meals, Ki Forte MD, 1,300 mg at 06/03/24 0804    sotalol (BETAPACE) tablet 80 mg, 80 mg, Oral, Q12H STEPHIE, Emmanuelle Velasquez MD, 80 mg at 06/03/24 0804    Sotorasib TABS 960 mg, 960 mg, Oral, Daily, Rito Sheridan DO, 960 mg at 06/03/24 0806    venlafaxine (EFFEXOR) tablet 100 mg, 100 mg, Oral, HS, Marta Lazo MD, 100 mg at 06/02/24 9976    Laboratory Results:  Results from last 7 days   Lab Units 06/03/24  0536 06/02/24  2055 06/02/24  1352 06/02/24  0449 06/01/24  1745 06/01/24  1259 06/01/24  0643 06/01/24  0610 05/31/24  0429 05/30/24  0534 05/29/24  0442 05/28/24  1606 05/28/24  1605 05/28/24  1605 05/28/24  1602   WBC Thousand/uL 14.97*  --   --  13.75*  --   --  13.79*  --  14.51* 11.71* 13.95*  --   --   --  16.83*   HEMOGLOBIN g/dL 9.8*  --   --  9.2*  --   --  9.3*  --  8.8* 9.3* 10.1*  --   --   --  9.9*   I STAT HEMOGLOBIN g/dl  --   --   --   --   --   --   --   --   --   --   --  10.2*   < > 11.2*  --    HEMATOCRIT % 33.4*  --   --  31.6*  --   --  31.8*  --  30.7* 32.4* 34.8  --   --   --  33.9*   HEMATOCRIT, ISTAT %  --   --   --   --   --   --   --   --   --   --   --  30*   < > 33*  --    PLATELETS Thousands/uL 371  --   --  388  --   --  381  --  376 390 516*  --   --   --  456*   POTASSIUM mmol/L 3.8 4.8 4.2 3.5 4.4 4.2  --  4.4 3.6 3.6 4.1  --   --   --  4.8   CHLORIDE mmol/L 96 95* 95* 94* 96 96  --  96 97 97 95*  --   --   --  95*   CO2 mmol/L 28 24 23 24 18* 24  --  16* 24 24 24  --   --   --  23   CO2, I-STAT mmol/L  --   --   --   --   --   --   --   --   --   --   --  25   < > 25  --    BUN mg/dL 24 27* 27* 27* 25 25  --  26* 30* 39* 38*  --   --   --  37*   CREATININE mg/dL 1.16 1.17 1.19 1.27 1.12 1.09   "--  1.10 1.17 1.51* 1.57*  --   --   --  1.57*   CALCIUM mg/dL 9.2 8.6 9.0 9.1 8.7 9.3  --  9.0 8.9 9.0 9.3  --   --   --  9.2   MAGNESIUM mg/dL  --   --   --   --   --   --   --   --  2.2 1.6*  --   --   --   --   --    GLUCOSE, ISTAT mg/dl  --   --   --   --   --   --   --   --   --   --   --  117  --  118  --     < > = values in this interval not displayed.       Portions of the record may have been created with voice recognition software. Occasional wrong word or \"sound a like\" substitutions may have occurred due to the inherent limitations of voice recognition software. Read the chart carefully and recognize, using context, where substitutions have occurred. If you have any questions, please contact the dictating provider.    "

## 2024-06-03 NOTE — CONSULTS
[facility]  Neuro Consult - Name: Melany Griffith 73 y.o. female I MRN: [id number]  Unit/Bed#: S -01 I Date of Admission: 5/28/2024   Date of Service: 6/3/2024 I Hospital Day: 5    Inpatient consult to Neurology  Consult performed by: EDUARDO Metzger  Consult ordered by: Jeana Edwards MD      Reason for Consult / Principal Problem: Left sided weakness post fall  Hx and PE limited by: None although the patient is not a precise historian.  Review of previous medical records was completed.   Family, was not present at the bedside for history and examination    Assessment & Plan   Fall  Assessment & Plan  Neurology is asked to see this right-hand-dominant 73-year-old patient after she was admitted approximately a week ago on the 28th after having a fall at home.  She is on Eliquis and aspirin at baseline because of her A-fib.  She was seen initially by the trauma team here at that time and has apparently been doing well (ambulating greater than 150 feet with standby assist and supervision) recovering until yesterday when she had a rapid response called because the staff heard a noise and found her on the floor.  Patient reports that she had wanted to stand up as she wondered if she had accidentally urinated or wet herself.  Apparently when she went to stand up she found that she was weak.  This caused her fall.  Again on this fall she reportedly did not strike her head.  She was taken down to CAT scan yesterday and had no acute change.  Today because of the change in exam neurology is asked to see her.  She has been taking down again to CT today to follow-up.  As we review her notes I see that the she either skipped or her Eliquis was held on the morning of the 31st with other p.o. meds.  -  MRI of brain with out contrast - likely to be done this evening or tomorrow a.m.  -  maintain SBP up to 180,  If he becomes hypotensive decrease head of the bed maintain perfusion  pressure with the fluids.  -  Telemetry, continue no AFib seen so far,   -  Continue her normal Eliquis and low-dose aspirin   -  Lipid panel - Statin medication started and to be maintained post discharge  -  A1C & other labs  -  Keep euvolemic, as dehydration can worsen exam and function  -  Treat high blood sugars if arises  -  Therapies to include PT/OT/ST  -  DVT prevention   -  Secondary stroke / TIA  risk factor modification  -  Frequent neurological check and notify neurology with any change in neuro status  -  Continue to monitor for infectious and metabolic derangements and treat as arises    Paroxysmal atrial fibrillation (HCC)  Assessment & Plan  This patient reports that she has a long history of A-fib.  She has been not maintained on Eliquis and sotalol.  They have currently started her on metoprolol for better heart rate control.           This patient already sees us in the office.  She will need to be seen again as a hospital follow-up.  Would be preferable if we could see her in 1 to 2 months.  Her meds are yet to be determined pending the rest of her workup.          HPI: Melany Griffith is a right handed  73 y.o. female who neurology is asked to see today on day 6 of this patient's hospital stay.  She was initially admitted on 28 May after she had a fall at home for which she reports to be dizziness.  Because she has a history of A-fib she is on Eliquis as well as aspirin.  She reports she did not have a stroke to us although the chart reports that she did.  The patient was initially seen by the trauma team and has been recovering well in the hospital here.  I spoke to the physical therapist who saw her her nose or from both yesterday and today and yesterday before an in the hospital fall she had progressed to walking 100 or so feet with only standby assistance and supervision.  She had a fall yesterday afternoon, I reviewed the rapid response note and spoke with the patient.  The staff heard a  noise in came into the room to find her on her back next to the bed.  The patient reports to us that she was questioning whether or not she may have had some urinary incontinence and went to stand up and today she reports that she thinks she was dizzy again.  In light of the reported weakness that physical therapy appreciated yesterday and the fact that she was a heavy assist out of bed to the chair today appears as if she did have the left sided or the left leg weakness when PT transferred her to the chair today.      ROS: 12 system cued query: reports last fall was couple weeks ago .   She has not been out of bed today.  She reports no headache no dizziness today.  No chest pain shortness of breath or palpitations.  The remainder of her inquiry is negative.      Historical Information     Past Medical History:   Diagnosis Date    A-fib (HCC)     Arthritis     Atrial fibrillation (HCC)     Confusion     Diabetes mellitus (HCC)     Diabetes mellitus type 2, uncomplicated (HCC)     Last assessed: 8/17/17    Encephalopathy 1/6/2022    Essential hypertension     Frozen shoulder     L shoulder    GERD (gastroesophageal reflux disease)     Hx of cancer of uterus     Last assessed: 8/21/15    Hyperlipidemia     Hypertension     Hyponatremia 11/16/2016    Lung mass     diagnosed 9/2016    Malignant neoplasm without specification of site (HCC)     Skin cancer, basal cell     right eye area    Stage 4 lung cancer (HCC)     Thrombocytopenia, unspecified (HCC) 1/20/2023     Past Surgical History:   Procedure Laterality Date    APPENDECTOMY      BRONCHOSCOPY N/A 11/17/2016    Procedure: BRONCHOSCOPY FLEXIBLE;  Surgeon: Giles Alonso MD;  Location: [facility];  Service:     CHOLECYSTECTOMY      COLONOSCOPY      COLONOSCOPY      FL GUIDED CENTRAL VENOUS ACCESS DEVICE INSERTION  12/14/2021    GALLBLADDER SURGERY      HYSTERECTOMY  2000    Total abdominal    JOINT REPLACEMENT      LARYNGOSCOPY      Flexible Fiberoptic,  (Therapeutic), Resolved: 11/17/16    MOHS SURGERY      Micrographic Surgery Face    VT [facility] INCL FLUOR GDNCE DX W/CELL WASHG SPX N/A 5/24/2017    Procedure: BRONCHOSCOPY FLEXIBLE;  Surgeon: Denzel Manning MD;  Location: [facility];  Service: Pulmonary    VT BRONCHOSCOPY NEEDLE BX TRACHEA MAIN STEM&/BRON N/A 11/17/2016    Procedure: EBUS; FROZEN SECTION ;  Surgeon: Giles Alonso MD;  Location: [facility];  Service: Thoracic    VT INSJ TUNNELED CTR VAD W/SUBQ PORT AGE 5 YR/> N/A 12/14/2021    Procedure: INSERTION VENOUS PORT ( PORT-A-CATH) IR;  Surgeon: Hansel Garduno DO;  [facility];  Service: Interventional Radiology    TONSILECTOMY AND ADNOIDECTOMY      TONSILLECTOMY      TOTAL KNEE ARTHROPLASTY Right 09/23/2014       Social History lives w brother , she has 2 adult children, quit smoking 25 years ago.  Very little alcohol no recreational's.        Family History:   Family History   Problem Relation Age of Onset    Heart disease Father         cardiac disorder    Hypertension Father     Arthritis Father     Stroke Father         cerebrovascular accident    Skin cancer Father     Diabetes Mother     Heart disease Mother     Dementia Mother     Hypertension Mother     Thyroid disease Mother     Cancer Maternal Grandfather         of unknown origin    Cancer Family     Depression Family     Diabetes Family     Hyperlipidemia Family         essential    Heart disease Family     Hypertension Family     Stroke Family         syndrome    Lung cancer Paternal Uncle     Muscular dystrophy Brother          Allergies   Allergen Reactions    Amoxicillin Hives    Amoxicillin Rash and Hives    Cardizem [Diltiazem] Rash     Rash      Statins Myalgia     Severe muscle aching  Terrible pains    Zofran [Ondansetron] Palpitations     Meds:all current active meds have been reviewed and she is compliant with her Eliquis at home by her report however she did not have a dose here on the 31st.    Scheduled  Meds:  Current Facility-Administered Medications   Medication Dose Route Frequency    acetaminophen  975 mg Oral Q8H PRN    apixaban  5 mg Oral BID    aspirin  81 mg Oral Daily    atorvastatin  40 mg Oral Daily    busPIRone  7.5 mg Oral HS    cyanocobalamin  100 mcg Oral Daily    folic acid  1 mg Oral Daily    gabapentin  200 mg Oral BID    insulin lispro  1-5 Units Subcutaneous TID AC    insulin lispro  1-5 Units Subcutaneous HS    melatonin  3 mg Oral HS    metoprolol tartrate  50 mg Oral Q12H    pantoprazole  40 mg Oral Early Morning    polyethylene glycol  17 g Oral Daily    prochlorperazine  10 mg Oral TID PRN    senna  1 tablet Oral Daily    sodium bicarbonate  650 mg Oral BID after meals    sotalol  80 mg Oral Q12H STEPHIE    Sotorasib  960 mg Oral Daily    torsemide  20 mg Oral BID    venlafaxine  100 mg Oral HS     PRN Meds:.  acetaminophen    prochlorperazine      Physical Exam:   Objective   Vitals:Blood pressure 111/64, pulse 80, temperature 97.6 °F (36.4 °C), temperature source Axillary, resp. rate 16, weight 67.9 kg (149 lb 11.1 oz), SpO2 (!) 89%, not currently breastfeeding.,Body mass index is 27.38 kg/m².      Patient was examined in bed family present  General: alert, appears stated age and cooperative  Head: Normocephalic, without obvious abnormality, atraumatic  Oral exam: lips, mucosa, and tongue moist;   Neck: no carotid bruit,   Lungs: clear to auscultation ant. bilaterally  Heart: regular rate and rhythm, S1, S2 normal, no murmur appreciated,   Abdomen: soft, +BS    Extremities: atraumatic, no cyanosis or edema    Neurologic:   Mental status: Alert, grossly oriented, thought content appropriate but her history is shallow.  CN Exam: MALOU, EOM's I, VF full, Gaze conjugate No sensory or motor lateralizations (No PP on face), Hearing I B, CNIX-XII I B  Motor: full power, age appropriate x 4 limbs with the exception of L hip flexion  Sensory: intact  X 4 limbs, mod inc lt, temp, (not PP tested on  todays exam)  Cerebellar: no past pointing or drift from modified Romberg position, no ataxia w maneuvers, Fine motor & finger taps, age appropriate on R, L, somewhat slower on   DTR's: Age appropriate, WNL; Plantars: downgoing  Gait: As reported by PT she was ambulating 100 feet yesterday easily.  She was a heavy lift and transfer with assist out of bed today.       Lab Results: I have personally reviewed pertinent reports.  , CBC:   Results from last 7 days   Lab Units 06/03/24  0536 06/02/24  0449 06/01/24  0643   WBC Thousand/uL 14.97* 13.75* 13.79*   RBC Million/uL 4.35 4.14 4.14   HEMOGLOBIN g/dL 9.8* 9.2* 9.3*   HEMATOCRIT % 33.4* 31.6* 31.8*   MCV fL 77* 76* 77*   PLATELETS Thousands/uL 371 388 381   , BMP/CMP:   Results from last 7 days   Lab Units 06/03/24  0536 06/02/24 2055 06/02/24  1352 05/29/24  0442 05/28/24  1606 05/28/24  1605 05/28/24  1605 05/28/24  1602   SODIUM mmol/L 133* 129* 129*   < >  --   --   --  130*   POTASSIUM mmol/L 3.8 4.8 4.2   < >  --   --   --  4.8   CHLORIDE mmol/L 96 95* 95*   < >  --   --   --  95*   CO2 mmol/L 28 24 23   < >  --   --   --  23   CO2, I-STAT mmol/L  --   --   --   --  25   < > 25  --    BUN mg/dL 24 27* 27*   < >  --   --   --  37*   CREATININE mg/dL 1.16 1.17 1.19   < >  --   --   --  1.57*   GLUCOSE, ISTAT mg/dl  --   --   --   --  117  --  118  --    CALCIUM mg/dL 9.2 8.6 9.0   < >  --   --   --  9.2   AST U/L  --   --   --   --   --   --   --  13   ALT U/L  --   --   --   --   --   --   --  11   ALK PHOS U/L  --   --   --   --   --   --   --  95   EGFR ml/min/1.73sq m 46 45 44   < >  --   --   --  32    < > = values in this interval not displayed.   , Vitamin B12:   , HgBA1C:   , TSH:   Results from last 7 days   Lab Units 06/02/24  0449   TSH 3RD GENERATON uIU/mL 1.576   , Coagulation:   , Lipid Profile:        Imaging Studies: I have personally reviewed pertinent films in PACS and have reviewed both her initial CAT scan as well as the CAT scan just done  today.  She needs an MRI to rule out a small infarct given her A-fib and the recent fall and head strike possibly.  There is no acute injury or bleed seen on the CT scan done today approximately 24 hours after the fall yesterday.  She should have an MRI tonight or tomorrow.    EEG, Echo, Pathology, and Other Studies:  This patient has not had an echocardiogram since January 22.    Counseling / Coordination of Care  Total time spent today approximately 40 minutes. Greater than 50% of total time was spent with the patient and / or family counseling and / or coordination of care. A description of the counseling / coordination of care: All of the above was discussed and reviewed with the patient was also discussed with both internal medicine as well as the physical therapy staff who saw her today.  All of her questions were answered within the limits of the workup at this time.      Dictation voice to text software has been used in the creation of this document. Please consider this in light of any contextual or grammatical errors.

## 2024-06-03 NOTE — ASSESSMENT & PLAN NOTE
Wt Readings from Last 3 Encounters:   06/04/24 68.4 kg (150 lb 12.7 oz)   05/21/24 69.3 kg (152 lb 12.8 oz)   05/20/24 71.4 kg (157 lb 8 oz)     Last Echo in 2022 showed EF 60, grade 1 diastolic dysfunction.   Lungs clear on exam, no SOB. 3+ lower extremitiy edema on exam, consider likely medication side effect from Sotorasib.   Home Lasix recently increased to 40 mg daily prn for swelling likely causing ADITYA  S/p Gentle hydration for ADITYA  Continues to have bilateral lower extremity edema; chest clear to auscultation.  No concerns for volume overload this time    Plan:  Lasix switched to torsemide 20 mg twice daily   Yes

## 2024-06-03 NOTE — ASSESSMENT & PLAN NOTE
Neurology is asked to see this right-hand-dominant 73-year-old patient after she was admitted approximately a week ago on the 28th after having a fall at home.  She is on Eliquis and aspirin at baseline because of her A-fib.  She was seen initially by the trauma team here at that time and has apparently been doing well (ambulating greater than 150 feet with standby assist and supervision) recovering until yesterday when she had a rapid response called because the staff heard a noise and found her on the floor.  Patient reports that she had wanted to stand up as she wondered if she had accidentally urinated or wet herself.  Apparently when she went to stand up she found that she was weak.  This caused her fall.  Again on this fall she reportedly did not strike her head.  She was taken down to CAT scan yesterday and had no acute change.  Today because of the change in exam neurology is asked to see her.  She has been taking down again to CT today to follow-up.  As we review her notes I see that the she either skipped or her Eliquis was held on the morning of the 31st with other p.o. meds.  -  MRI of brain with out contrast - likely to be done this evening or tomorrow a.m.  -  maintain SBP up to 180,  If he becomes hypotensive decrease head of the bed maintain perfusion pressure with the fluids.  -  Telemetry, continue no AFib seen so far,   -  Continue her normal Eliquis and low-dose aspirin   -  Lipid panel - Statin medication started and to be maintained post discharge  -  A1C & other labs  -  Keep euvolemic, as dehydration can worsen exam and function  -  Treat high blood sugars if arises  -  Therapies to include PT/OT/ST  -  DVT prevention   -  Secondary stroke / TIA  risk factor modification  -  Frequent neurological check and notify neurology with any change in neuro status  -  Continue to monitor for infectious and metabolic derangements and treat as arises

## 2024-06-03 NOTE — PHYSICAL THERAPY NOTE
PHYSICAL THERAPY REEVAL NOTE    Patient Name: Melany Griffith  Today's Date: 6/3/2024     06/03/24 1438   PT Last Visit   PT Visit Date 06/03/24   Pain Assessment   Pain Assessment Tool 0-10   Pain Score 3  (at rest, 6/10 w/ activity)   Pain Location/Orientation Other (Comment)  (left flank, left hip, left lateral knee)   Hospital Pain Intervention(s) Repositioned;Ambulation/increased activity   Restrictions/Precautions   Other Precautions Cognitive;Chair Alarm;Bed Alarm;Fall Risk;Pain  (Masimo)   General   Chart Reviewed Yes   Additional Pertinent History pt was seen previously for PT intervention. pt is s/p fall 6/2/24. reeval completed due to decline in pt's mobility status. Dx: ADITYA on CKD, a-fib, Adenocarcinoma of lung, hyponatremia, chronic diastolic heart failure, type 2 diabetes mellitus, lower extremity edema, ambulatory dysfunction, acral wound, severe protein-calorie malnutrition, and sepsis. Personal factors and comorbidities: see initial eval. R UE: WFL. L UE: limited shoulder ROM, otherwise WFL. R LE: WFL active ROM, 4-/5. L LE: limited active ROM, hip flexion 2+/5 extension 3-/5, knee flexion 2/5 extension 2+/5, ankle 2/5. Light touch normal B LEs. Clinical presentation: unstable/unpredictable (evident in need for assist w/ all phases of mobility, need for input for mobility technique/safety, inability to ambulate, pain affecting mobility status).   Family/Caregiver Present No   Cognition   Overall Cognitive Status Impaired   Attention Attends with cues to redirect   Orientation Level Oriented to person;Oriented to place;Disoriented to time;Other (Comment)  (pt was identified w/ full name, birth date)   Following Commands Follows one step commands inconsistently   Subjective   Subjective pt seen sitting out of bed. frequent cues were needed for task focus. pt states having left flank, left hip, and left lateral knee pain.  pt agreed to participate in PT session.   Bed Mobility   Sit to Supine 2  Maximal assistance   Additional items Assist x 1;HOB elevated;Bedrails;Increased time required;Impulsive;Verbal cues;LE management  (for trunk/LE positioning, bedrail use, safety)   Transfers   Sit to Stand 2  Maximal assistance   Additional items Assist x 1;Increased time required;Impulsive;Verbal cues  (for hand placement, LE positioning)   Stand to Sit 2  Maximal assistance  (poorly controlled descent to sitting surface)   Additional items Assist x 1;Impulsive;Verbal cues  (for body positioning, hand placement, safety)   Stand pivot 2  Maximal assistance  (chair to bedside commode, bedside commode to edge of bed)   Additional items Assist x 1;Increased time required;Impulsive;Verbal cues  (for walker placement, LE positioning, task focus/safety)   Toilet transfer 2  Maximal assistance   Additional items Assist x 1;Commode;Increased time required;Impulsive;Verbal cues  (for body positioning, armrest use, task focus/safety)   Additional Comments pt was incontinent of urine during mobilization   Ambulation/Elevation   Gait pattern Not appropriate  (pt was unable to advance LEs in standing to ambulate)   Assistive Device Rolling walker   Balance   Static Sitting Fair   Static Standing Poor -  (w/ roller walker)   Ambulatory Zero   Activity Tolerance   Activity Tolerance Patient limited by pain;Patient limited by fatigue   Nurse Made Aware spoke to Macy DANIELLE, Oswald Shankar and Aviva Gumzan CM   Assessment   Problem List Decreased strength;Decreased endurance;Impaired balance;Decreased mobility;Decreased cognition;Impaired judgement;Decreased safety awareness;Decreased range of motion   Assessment Pt shows significant decline in mobility status since previous PT session. Pt requires increased level of assistance to mobilize safely and is unable to ambulate. Mobility impairments are related to pain, weakness, impaired balance, and decreased  endurance. Functional impairment is evident in Barthel Index score of 25/100. Pt is at risk for additional falls due to physical and safety awareness impairments. Continued inpatient PT is needed to address these issues and maximize level of functional independence.   Goals   Patient Goals I want to get better.   STG Expiration Date 06/08/24   Short Term Goal #1 pt will: Increase bilateral LE strength 1/2 grade to facilitate independent mobility, Perform bed mobility w/ minx1 to increase level of independence, Perform all transfers w/ minx1 to improve independence, Ambulate 100 ft. with least restrictive assistive device w/ minx1 w/o LOB to improve functional independence, Increase all balance 1 grade to decrease risk for falls, Complete exercise program independently to increase strength and endurance, Tolerate 3 hr OOB to faciliate upright tolerance, Tolerate standing 2 minutes w/ supervision to facilitate functional task performance, Improve Barthel Index score to 45 or greater to facilitate independence, and Complete Timed Up and Go or Comfortable Gait Speed to further assess mobility and monitor progress   PT Treatment Day 1   Plan   Treatment/Interventions Functional transfer training;LE strengthening/ROM;Therapeutic exercise;Endurance training;Cognitive reorientation;Patient/family training;Equipment eval/education;Gait training;Bed mobility;Compensatory technique education   Progress Slow progress, decreased activity tolerance   PT Frequency 3-5x/wk   Discharge Recommendation   Rehab Resource Intensity Level, PT II (Moderate Resource Intensity)   Equipment Recommended Walker   Walker Package Recommended Wheeled walker   Change/add to Walker Package? No   Additional Comments (S)  Neurology consult would benefit pt to address current physical and mobility impairments.  (notified [facility] via 1EQ)   AM-PAC Basic Mobility Inpatient   Turning in Flat Bed Without Bedrails 2   Lying on Back to Sitting on Edge  of Flat Bed Without Bedrails 2   Moving Bed to Chair 2   Standing Up From Chair Using Arms 2   Walk in Room 1   Climb 3-5 Stairs With Railing 1   Basic Mobility Inpatient Raw Score 10   Turning Head Towards Sound 3   Follow Simple Instructions 3   Low Function Basic Mobility Raw Score  16   Low Function Basic Mobility Standardized Score  25.72   [facility] Highest Level Of Mobility   -[facility] Goal 4: Move to chair/commode   -[facility] Achieved 5: Stand (1 or more minutes)   End of Consult   Patient Position at End of Consult Supine;Bed/Chair alarm activated;All needs within reach     The patient's AM-PAC Basic Mobility Inpatient Short Form Raw Score is 10. A Raw score of less than or equal to 16 suggests the patient may benefit from discharge to post-acute rehabilitation services. Please also refer to the recommendation of the Physical Therapist for safe discharge planning.    Skilled inpatient PT recommended while in hospital to progress pt toward treatment goals.    Emeterio Briceno, PT

## 2024-06-03 NOTE — ASSESSMENT & PLAN NOTE
S/p mechanical fall with no LOC. On eliquis. Sustained hit to back of the head and R shoulder. Trauma workup negative. Not currently in any pain.   Reported left hip and left knee pain; X-rays were ordered  Due to worsening neurologic function including imbalance and poor coordination of left side extremities CT head was repeated on 6/3 which was unremarkable and neurology was consulted    Plan  Tylenol prn for pain control  PT/OT recommending PAR, which patient agrees is a good idea  Fall precautions  Neurology consulted, recs appreciated:  MRI brain without contrast pending

## 2024-06-03 NOTE — ASSESSMENT & PLAN NOTE
Recent Labs     06/02/24 2055 06/03/24  0536 06/04/24  0530   SODIUM 129* 133* 134*       Will consult nephrology given worsening  Etiology per nephro: Hyponatremia most likely due to SIADH as patient on  mirtazapine, Effexor and buspirone , PPI which could cause SIADH and hyponatremia.   Has underlying stage IV lung carcinoma which could cause SIADH.  Also has component of fluid overload   S/p Tolvaptan x2  Urine osmolality 162 after tolvaptan; Likely diluted  Sodium currently stable     Plan:  Continue fluid restriction  Continue torsemide PO 20 mg twice daily

## 2024-06-03 NOTE — PLAN OF CARE
Problem: OCCUPATIONAL THERAPY ADULT  Goal: Performs self-care activities at highest level of function for planned discharge setting.  See evaluation for individualized goals.  Description: Treatment Interventions: ADL retraining, Functional transfer training, UE strengthening/ROM, Endurance training, Cognitive reorientation, Patient/family training, Equipment evaluation/education, Compensatory technique education, Continued evaluation (cog assessment)          See flowsheet documentation for full assessment, interventions and recommendations.   Outcome: Progressing  Note: Limitation: Decreased ADL status, Decreased UE strength, Decreased Safe judgement during ADL, Decreased cognition, Decreased endurance, Decreased self-care trans, Decreased high-level ADLs (impaired balance, fxnl mobility, act dulce, FM coord, fxnl reach, trunk control, standing dulce, and strength, attention to task, safety awareness, insight, pacing, problem solving, and learning new tasks, response time)  Prognosis: Good  Assessment: Pt is a 73 y.o. female admitted to [facility] on 5/28/2024 due to ADITYA. Pt seen on this date for OT Re-Evaluation due to RR called on 6/2, followed by change in functional status. Please refer to H&P/ initial OT eval (5/29) for detailed PMH and social history. At baseline, pt is (I) c ADLs, needs A with IADLs. no AD vs occasional RW vs SPC use. lives c handicapped brother. + driving + falls. Upon re-eval pt performed as is listed above, demonstrating regression in movement of L UE + LLE, standing balance, functional mobility, ADL performance, attention to task, alertness. Pt would benefit from continued skilled OT treatment in order to maximize safety, independence and overall performance with ADLs, functional transfers, functional mobility and cognition in order to achieve highest level of function. Updated goals are listed below     Rehab Resource Intensity Level, OT: II (Moderate Resource Intensity)

## 2024-06-03 NOTE — OCCUPATIONAL THERAPY NOTE
Occupational Therapy Re-Evaluation     Patient Name: Melany Griffith  Today's Date: 6/3/2024  Problem List  Principal Problem:    ADITYA on CKD  Active Problems:    Paroxysmal atrial fibrillation (HCC)    Adenocarcinoma of lung, stage 4 (HCC)    Hyponatremia    Chronic diastolic heart failure (HCC)    Type 2 diabetes mellitus with stage 3 chronic kidney disease, without long-term current use of insulin, unspecified whether stage 3a or 3b CKD (HCC)    Lower extremity edema    Constipation    Fall    Ambulatory dysfunction    Acute pain of right shoulder    Sacral wound    Severe protein-calorie malnutrition (HCC)    Sepsis (HCC)    Past Medical History  Past Medical History:   Diagnosis Date    A-fib (HCC)     Arthritis     Atrial fibrillation (HCC)     Confusion     Diabetes mellitus (HCC)     Diabetes mellitus type 2, uncomplicated (HCC)     Last assessed: 8/17/17    Encephalopathy 1/6/2022    Essential hypertension     Frozen shoulder     L shoulder    GERD (gastroesophageal reflux disease)     Hx of cancer of uterus     Last assessed: 8/21/15    Hyperlipidemia     Hypertension     Hyponatremia 11/16/2016    Lung mass     diagnosed 9/2016    Malignant neoplasm without specification of site (HCC)     Skin cancer, basal cell     right eye area    Stage 4 lung cancer (HCC)     Thrombocytopenia, unspecified (HCC) 1/20/2023     Past Surgical History  Past Surgical History:   Procedure Laterality Date    APPENDECTOMY      BRONCHOSCOPY N/A 11/17/2016    Procedure: BRONCHOSCOPY FLEXIBLE;  Surgeon: Giles Alonso MD;  Location: [facility];  Service:     CHOLECYSTECTOMY      COLONOSCOPY      COLONOSCOPY      FL GUIDED CENTRAL VENOUS ACCESS DEVICE INSERTION  12/14/2021    GALLBLADDER SURGERY      HYSTERECTOMY  2000    Total abdominal    JOINT REPLACEMENT      LARYNGOSCOPY      Flexible Fiberoptic, (Therapeutic), Resolved: 11/17/16    MOHS SURGERY      Micrographic Surgery Face    HI BRNCHSC INCL FLUOR GDNCE DX W/CELL WASHG  "SPX N/A 5/24/2017    Procedure: BRONCHOSCOPY FLEXIBLE;  Surgeon: Denzel Manning MD;  Location: [facility];  Service: Pulmonary    ME BRONCHOSCOPY NEEDLE BX TRACHEA MAIN STEM&/BRON N/A 11/17/2016    Procedure: EBUS; FROZEN SECTION ;  Surgeon: Giles Alonso MD;  Location: [facility];  Service: Thoracic    ME INSJ TUNNELED CTR VAD W/SUBQ PORT AGE 5 YR/> N/A 12/14/2021    Procedure: INSERTION VENOUS PORT ( PORT-A-CATH) IR;  Surgeon: Hansel Garduno DO;  [facility];  Service: Interventional Radiology    TONSILECTOMY AND ADNOIDECTOMY      TONSILLECTOMY      TOTAL KNEE ARTHROPLASTY Right 09/23/2014 06/03/24 1128   OT Last Visit   OT Visit Date 06/03/24   Note Type   Note type Re-Evaluation   Pain Assessment   Pain Assessment Tool 0-10   Pain Score No Pain   Restrictions/Precautions   Weight Bearing Precautions Per Order No   Other Precautions Cognitive;Chair Alarm;Bed Alarm;Multiple lines;Fall Risk;Pain   Home Living   Additional Comments see IE for PLOF + home set up   Lifestyle   Autonomy At baseline, pt is (I) c ADLs, needs A with IADLs. no AD vs occasional RW vs SPC use. lives c handicapped brother. + driving + falls   Reciprocal Relationships supportive cousin. brother   Service to Others retired   Intrinsic Gratification used to enjoy crafts, but reports \"they are too difficult to clean up now\". Reports that she made masks during COVID, as well   General   Additional Pertinent History Since IE pt with RR called on 6/2 following a fall. Pt admitted d/t ADITYA on CKD. Intitially present to ED d/t fall c HS, acute R shoulder pain. Hx of stage 4 ademocarcinoma of lung, moderate malnutrition, ambulatory dysfunction, CVA c residual memory and balance deficits per EMR.   Family/Caregiver Present No   Subjective   Subjective \"You can call me whatever\"   ADL   Eating Assistance 5  Supervision/Setup   Grooming Assistance 5  Supervision/Setup   Grooming Deficit Increased time to " complete;Supervision/safety;Verbal cueing;Brushing hair   UB Bathing Assistance 4  Minimal Assistance   LB Bathing Assistance 2  Maximal Assistance   UB Dressing Assistance 4  Minimal Assistance   LB Dressing Assistance 2  Maximal Assistance   LB Dressing Deficit Increased time to complete;Verbal cueing;Supervision/safety;Don/doff R sock;Don/doff L sock;Thread RLE into underwear;Thread LLE into underwear;Pull up over hips   Toileting Assistance  2  Maximal Assistance   Toileting Deficit Clothing management down;Perineal hygiene;Clothing management up;Bedside commode   Bed Mobility   Supine to Sit 2  Maximal assistance   Additional items Assist x 1;Increased time required;Verbal cues;LE management  (Pt noted with difficulty moving LLE)   Additional Comments sitting EOB with overall (S)   Transfers   Sit to Stand 2  Maximal assistance   Additional items Assist x 1;Increased time required;Verbal cues   Stand to Sit 2  Maximal assistance   Additional items Assist x 1;Increased time required;Verbal cues   Stand pivot 2  Maximal assistance   Additional items Assist x 1;Increased time required;Verbal cues   Toilet transfer 2  Maximal assistance   Additional items Assist x 1;Increased time required;Verbal cues;Commode   Additional Comments use of RW, edu on hand placement   Functional Mobility   Functional Mobility 2  Maximal assistance   Additional Comments Ax1, for x2 side steps during pivot, unable to advance further - noted difficulty with lifting LLE   Additional items Rolling walker   Balance   Static Sitting Fair +   Dynamic Sitting Fair   Static Standing Poor -   Dynamic Standing Poor -   Ambulatory Poor -   Activity Tolerance   Activity Tolerance Patient limited by fatigue;Patient limited by pain   Medical Staff Made Aware DIVYA Viera conversation with [facility] resident   RUE Assessment   RUE Assessment WFL   RUE Strength   R Shoulder Flexion 4-/5   R Elbow Flexion 3+/5   R Elbow Extension 4/5   LUE Assessment   LUE  Assessment X   LUE Strength   L Shoulder Flexion 3+/5   L Elbow Flexion 3/5   L Elbow Extension 3+/5   Hand Function   Gross Motor Coordination Functional   Fine Motor Coordination Impaired   Hand Function Comments POOR FM coordination of LUE- pt noted to drop comb out of hand and unaware of dropping twice   Sensation   Light Touch No apparent deficits  (pt denies)   Vision-Basic Assessment   Current Vision Wears glasses all the time   Perception   Inattention/Neglect Appears intact   Cognition   Overall Cognitive Status Impaired   Arousal/Participation Lethargic  (lethargic upon arrival - with alerting stimuli able to remain alert)   Attention Attends with cues to redirect   Orientation Level Oriented to person;Oriented to place;Disoriented to time;Disoriented to situation   Memory Decreased short term memory;Decreased recall of recent events;Decreased recall of precautions   Following Commands Follows one step commands with increased time or repetition   Comments Pt with poor alertness upon arrival - pt falling asleep mid sentence talking to therapist, once sitting upright pt with improved attention, poor recall of events and limited timeline. Limited safety awareness   Assessment   Limitation Decreased ADL status;Decreased UE strength;Decreased Safe judgement during ADL;Decreased cognition;Decreased endurance;Decreased self-care trans;Decreased high-level ADLs  (impaired balance, fxnl mobility, act dulce, FM coord, fxnl reach, trunk control, standing dulce, and strength, attention to task, safety awareness, insight, pacing, problem solving, and learning new tasks, response time)   Prognosis Good   Assessment Pt is a 73 y.o. female admitted to [facility] on 5/28/2024 due to ADITYA. Pt seen on this date for OT Re-Evaluation due to RR called on 6/2, followed by change in functional status. Please refer to H&P/ initial OT eval (5/29) for detailed PMH and social history. At baseline, pt is (I) c ADLs, needs A with IADLs. no AD vs  occasional RW vs SPC use. lives c handicapped brother. + driving + falls. Upon re-eval pt performed as is listed above, demonstrating regression in movement of L UE + LLE, standing balance, functional mobility, ADL performance, attention to task, alertness. Pt would benefit from continued skilled OT treatment in order to maximize safety, independence and overall performance with ADLs, functional transfers, functional mobility and cognition in order to achieve highest level of function. Updated goals are listed below   Goals   Patient Goals to get stronger   LTG Time Frame 10-14   Long Term Goal see goals listed below   Plan   Treatment Interventions ADL retraining;Functional transfer training;UE strengthening/ROM;Endurance training;Cognitive reorientation;Patient/family training;Equipment evaluation/education;Compensatory technique education;Energy conservation;Activityengagement;Neuromuscular reeducation;Fine motor coordination activities   Goal Expiration Date 06/13/24   OT Treatment Day 1   OT Frequency 3-5x/wk   Discharge Recommendation   Rehab Resource Intensity Level, OT II (Moderate Resource Intensity)   AM-PAC Daily Activity Inpatient   Lower Body Dressing 2   Bathing 2   Toileting 2   Upper Body Dressing 2   Grooming 3   Eating 3   Daily Activity Raw Score 14   Daily Activity Standardized Score (Calc for Raw Score >=11) 33.39   AM-PAC Applied Cognition Inpatient   Following a Speech/Presentation 2   Understanding Ordinary Conversation 3   Taking Medications 1   Remembering Where Things Are Placed or Put Away 2   Remembering List of 4-5 Errands 1   Taking Care of Complicated Tasks 1   Applied Cognition Raw Score 10   Applied Cognition Standardized Score 24.98   End of Consult   Patient Position at End of Consult Bedside chair;Bed/Chair alarm activated;All needs within reach     UPDATED GOALS     Pt will complete UB ADLs Mod independent   for increased ADL independence within 10 days.      Pt will complete LB  ADLs min A  for increased ADL independence within 10 days.      Pt will complete toileting min A  with use of DME for increased ADL independence within 10 days.      Pt will demonstrate proper body mechanics to complete self-care transfers and functional mobility with Supervision household distances and use of LRAD for increased safety and functional independence within 10 days.      Pt will complete bed mobility min A for increased independence in repositioning, pressure offloading, and managing comfort.      Pt will demonstrate proper body mechanics and fall prevention strategies during 100% of tx sessions for increased safety awareness during ADL/IADLs     Pt will demonstrate activity tolerance of 30 min in therapeutic tasks for increased participation in meaningful activities upon D/C.     Pt will participate in ongoing cognitive assessments to assist with safe D/C planning and supervision/assistance recommendations.      Pt will demonstrate OOB sitting tolerance of 2-4 hr/day for increased activity tolerance and engagement in leisure activities within 10 days.       The patient's raw score on the -PAC Daily Activity Inpatient Short Form is 14. A raw score of less than 19 suggests the patient may benefit from discharge to post-acute rehabilitation services. Please refer to the recommendation of the Occupational Therapist for safe discharge planning.     Brinda Yuan MS, OTR/L

## 2024-06-03 NOTE — ASSESSMENT & PLAN NOTE
This patient reports that she has a long history of A-fib.  She has been not maintained on Eliquis and sotalol.  They have currently started her on metoprolol for better heart rate control.

## 2024-06-03 NOTE — PROGRESS NOTES
[facility]  Progress Note  Name: Melany Griffith I  MRN: [id number]  Unit/Bed#: S -01 I Date of Admission: 5/28/2024   Date of Service: 6/3/2024 I Hospital Day: 5    Assessment & Plan   * ADITYA on CKD  Assessment & Plan  Lab Results   Component Value Date    EGFR 46 06/03/2024    EGFR 45 06/02/2024    EGFR 44 06/02/2024    CREATININE 1.16 06/03/2024    CREATININE 1.17 06/02/2024    CREATININE 1.19 06/02/2024       POA Cr 1.57. Baseline appears to be around 1. Consider most likely secondary to hypovolemia given that she endorses poor PO intake recently, as well as recent increase in prn lasix use for lower extremity swelling. Consider swelling most likely a medication side effect from Sotorasib.     Plan  S/p Gentle hydration NS  UA: moderate leuks, asymptomatic, will not treat at this time  Urine sodium: 17  Urine creatinine: 144, crt:albumin ratio of 25  Bladder scan/urinary retention protocol  Restarted home sotolol, on metoprolol.   Home ARB held at this time, restart as BP permits  Per family, patient on valsartan 160 mg DAILY, not bid as listed in med rec  Promote normotension, avoid nephrotoxins  Creatinine improved to 1.16 today with diuretics; likely to be cardiorenal per nephrology  Lasix switched to torsemide 20 mg BID, as recommended by nephrology    Hyponatremia  Assessment & Plan  Recent Labs     06/02/24  1352 06/02/24  2055 06/03/24  0536   SODIUM 129* 129* 133*       Will consult nephrology given worsening  Etiology per nephro: Hyponatremia most likely due to SIADH as patient on  mirtazapine, Effexor and buspirone , PPI which could cause SIADH and hyponatremia.   Has underlying stage IV lung carcinoma which could cause SIADH.  Also has component of fluid overload   S/p Tolvaptan x2  Urine osmolality 162 after tolvaptan; Likely diluted  Sodium currently stable at 133    Plan:  Continue fluid restriction  IV furosemide transitioned to torsemide PO 20 mg twice  daily  Monitor BMP    Lower extremity edema  Assessment & Plan  Chronic but worsening recently. She does have a history of grade 1 diastolic dysfunction, but denies any recent SOB. Consider most likely secondary to Sotorasib use as this has been a reported side effect and pt does not appear to be in CHF exacerbation.     Plan  Apply compression stockings  B/L lower extremity doppler wnl, no clots  Continue Sotorasib for now  Lasix 40 mg IV BID switched to torsemide 20 mg twice daily    Sepsis (HCC)  Assessment & Plan  Meeting SIRS with HR 90s and WBC >12. Suspect urinary source given UA on admission. Patient unreliable and unable to give symptomatic history.   Treat UTI with ceftriaxone; 3 day course completed on 6/2   s/p IV hydration, blood pressures supported without further fluids  Trend WBC daily and vitals per unit routine    Ambulatory dysfunction  Assessment & Plan  Recent increased weakness with several recent falls. She endorses significant weight loss and poor appetite over the past 2 months. Lives at home, occasionally uses cane or walker but generally does not. Orthostatic vitals negative.    Plan  PT/OT - recommended inpatient rehab, patient amenable to going  Fall precautions  Increased risk due to sedation 2/2 gabapentin and remeron  D/c remeron for now  Consider restarting when renal function is back to baseline and sedation has decreased  Nutrition consult      Chronic diastolic heart failure (HCC)  Assessment & Plan  Wt Readings from Last 3 Encounters:   06/01/24 68 kg (149 lb 14.6 oz)   05/21/24 69.3 kg (152 lb 12.8 oz)   05/20/24 71.4 kg (157 lb 8 oz)     Last Echo in 2022 showed EF 60, grade 1 diastolic dysfunction.   Lungs clear on exam, no SOB. 3+ lower extremitiy edema on exam, consider likely medication side effect from Sotorasib.   Home Lasix recently increased to 40 mg daily prn for swelling likely causing ADITYA  S/p Gentle hydration for ADITYA  Continues to have bilateral lower extremity  edema; chest clear to auscultation.  No concerns for volume overload this time    Plan:  Lasix switched to torsemide 20 mg twice daily    Adenocarcinoma of lung, stage 4 (HCC)  Assessment & Plan  S/p chemo, radiation, and immunotherapy. Follows w/ Dr. Sam outpatient. Currently on Sotorasib only.     Plan  Will provide sotorasib from home    Severe protein-calorie malnutrition (HCC)  Assessment & Plan  Malnutrition Findings:   Adult Malnutrition type: Chronic illness  Adult Degree of Malnutrition: Other severe protein calorie malnutrition  Malnutrition Characteristics: Weight loss, Inadequate energy, Muscle loss                  360 Statement: Chronic/severe malnutrition r/t decreased PO intake/condition, as evidenced by 9.5% wt loss x 3 months (5/21/24: 152#, 2/27/24: 168#),  intake meeting <75% of estimated needs for > 1 month, and severe muscle mass depletion (temporal region). Treatment: liberal PO diet + nutrition supplements    BMI Findings:           Body mass index is 27.42 kg/m².   Remeron 7.5 tonight for appetite, sleep and mood  D/c remeron given sedation. Consider restarting outpatient once renal function stable    Sacral wound  Assessment & Plan  Small wound present in the R sacral/buttock area. Small amount of scabbing present, no current discharge. Low suspicion of infection.     Plan  Wound care consult  No need for abx at this time    Acute pain of right shoulder  Assessment & Plan  S/p fall. Trauma XR's all negative. Tylenol 975 prn.     Fall  Assessment & Plan  S/p mechanical fall with no LOC. On eliquis. Sustained hit to back of the head and R shoulder. Trauma workup negative. Not currently in any pain.     Plan  Tylenol prn for pain control  PT/OT recommending PAR, which patient agrees is a good idea  Fall precautions    Constipation  Assessment & Plan  She notes ongoing issues with constipation at home. Miralax and Senna both ordered daily. Increase/reduce as needed.     Type 2 diabetes  mellitus with stage 3 chronic kidney disease, without long-term current use of insulin, unspecified whether stage 3a or 3b CKD (HCC)  Assessment & Plan  Lab Results   Component Value Date    HGBA1C 7.2 (H) 2024       Recent Labs     24  1614 24  2116 24  0712 24  1053   POCGLU 175* 235* 216* 203*       Blood Sugar Average: Last 72 hrs:  (P) [id number]    Hold home medications. SSI algorithm 1, titrate as needed.     Paroxysmal atrial fibrillation (HCC)  Assessment & Plan  Continue home eliquis, sotalol. Metoprolol tartrate increased to 50 BID for better HR contol.                VTE Pharmacologic Prophylaxis: VTE Score: 7 High Risk (Score >/= 5) - Pharmacological DVT Prophylaxis Ordered: apixaban (Eliquis). Sequential Compression Devices Ordered.    Mobility:   Basic Mobility Inpatient Raw Score: 12  JH-HLM Goal: 4: Move to chair/commode  JH-HLM Achieved: 2: Bed activities/Dependent transfer  JH-HLM Goal NOT achieved. Continue with multidisciplinary rounding and encourage appropriate mobility to improve upon JH-HLM goals.    Patient Centered Rounds: I performed bedside rounds with nursing staff today.  Discussions with Specialists or Other Care Team Provider: Dr. Shankar, nephrology    Education and Discussions with Family / Patient: Updated  (daughter) via phone.    Current Length of Stay: 5 day(s)  Current Patient Status: Inpatient   Discharge Plan: Anticipate discharge tomorrow to [facility]    Code Status: Level 3 - DNAR and DNI    Subjective:   Patient evaluated at bedside.  She reported that her back has been hurting since her fall yesterday night.  Denies headache, lightheadedness, dizziness, shortness of breath, chest pain.    Objective:     Vitals:   Temp (24hrs), Av.6 °F (36.4 °C), Min:97.6 °F (36.4 °C), Max:97.6 °F (36.4 °C)    Temp:  [97.6 °F (36.4 °C)] 97.6 °F (36.4 °C)  HR:  [71-90] 83  Resp:  [16-18] 16  BP: (108-131)/() 130/100  SpO2:  [97 %-99  %] 97 %  Body mass index is 27.38 kg/m².     Input and Output Summary (last 24 hours):     Intake/Output Summary (Last 24 hours) at 6/3/2024 1150  Last data filed at 6/3/2024 0900  Gross per 24 hour   Intake 760 ml   Output 900 ml   Net -140 ml       Physical Exam:   Physical Exam  Constitutional:       General: She is not in acute distress.     Appearance: Normal appearance. She is not ill-appearing.   HENT:      Head: Normocephalic and atraumatic.   Cardiovascular:      Rate and Rhythm: Normal rate and regular rhythm.      Pulses: Normal pulses.      Heart sounds: Normal heart sounds.   Pulmonary:      Effort: Pulmonary effort is normal.      Breath sounds: Normal breath sounds.   Abdominal:      General: Abdomen is flat. Bowel sounds are normal.      Palpations: Abdomen is soft.      Tenderness: There is no abdominal tenderness.   Musculoskeletal:      Cervical back: No tenderness or bony tenderness.      Thoracic back: No tenderness or bony tenderness.      Lumbar back: No tenderness or bony tenderness.      Right lower leg: Edema present.      Left lower leg: Edema present.   Skin:     Capillary Refill: Capillary refill takes less than 2 seconds.   Neurological:      General: No focal deficit present.      Mental Status: She is alert.      Comments: Forgetful at baseline          Additional Data:     Labs:  Results from last 7 days   Lab Units 06/03/24  0536 06/01/24  0643 05/31/24  0429   WBC Thousand/uL 14.97*   < > 14.51*   HEMOGLOBIN g/dL 9.8*   < > 8.8*   HEMATOCRIT % 33.4*   < > 30.7*   PLATELETS Thousands/uL 371   < > 376   BANDS PCT %  --   --  1   SEGS PCT % 89*   < >  --    LYMPHO PCT % 5*   < > 6*   MONO PCT % 5   < > 6   EOS PCT % 0   < > 0    < > = values in this interval not displayed.     Results from last 7 days   Lab Units 06/03/24  0536 05/28/24  1605 05/28/24  1602   SODIUM mmol/L 133*   < > 130*   POTASSIUM mmol/L 3.8   < > 4.8   CHLORIDE mmol/L 96   < > 95*   CO2 mmol/L 28   < > 23   CO2,  I-STAT   --    < >  --    BUN mg/dL 24   < > 37*   CREATININE mg/dL 1.16   < > 1.57*   ANION GAP mmol/L 9   < > 12   CALCIUM mg/dL 9.2   < > 9.2   ALBUMIN g/dL  --   --  3.2*   TOTAL BILIRUBIN mg/dL  --   --  0.57   ALK PHOS U/L  --   --  95   ALT U/L  --   --  11   AST U/L  --   --  13   GLUCOSE RANDOM mg/dL 200*   < > 118    < > = values in this interval not displayed.         Results from last 7 days   Lab Units 06/03/24  1053 06/03/24  0712 06/02/24  2116 06/02/24  1614 06/02/24  1117 06/02/24  0738 06/01/24  2114 06/01/24  1527 06/01/24  1144 06/01/24  0737 05/31/24  1538 05/31/24  1138   POC GLUCOSE mg/dl 203* 216* 235* 175* 202* 158* 166* 187* 179* 158* 176* 95               Lines/Drains:  Invasive Devices       Central Venous Catheter Line  Duration             Port A Cath 12/14/21 Right Chest 902 days              Peripheral Intravenous Line  Duration             Peripheral IV 06/02/24 Right Antecubital <1 day                    Central Line:  Goal for removal:  chronic         Telemetry:  Telemetry Orders (From admission, onward)               24 Hour Telemetry Monitoring  Continuous x 24 Hours (Telem)        Question:  Reason for 24 Hour Telemetry  Answer:  Arrhythmias requiring acute medical intervention / PPM or ICD malfunction                     Telemetry Reviewed:  Intermittent afib  Indication for Continued Telemetry Use: Arrthymias requiring medical therapy             Imaging: Reviewed radiology reports from this admission including: chest xray, CT head, and CT spine, CT cervical    Recent Cultures (last 7 days):         Last 24 Hours Medication List:   Current Facility-Administered Medications   Medication Dose Route Frequency Provider Last Rate    acetaminophen  975 mg Oral Q8H PRN Rito Sheridan DO      apixaban  5 mg Oral BID Rito Sheridan DO      aspirin  81 mg Oral Daily Rito Sheridan DO      atorvastatin  40 mg Oral Daily Rito Sheridan DO      busPIRone  7.5 mg Oral HS Rito Sheridan DO       cyanocobalamin  100 mcg Oral Daily Rito Sheridan, DO      folic acid  1 mg Oral Daily Rito Sheridan, DO      gabapentin  200 mg Oral BID Rito Sheridan, DO      insulin lispro  1-5 Units Subcutaneous TID AC Rito Sheridan, DO      insulin lispro  1-5 Units Subcutaneous HS Rito Sheridan, DO      melatonin  3 mg Oral HS Marta Lazo MD      metoprolol tartrate  50 mg Oral Q12H Emmanuelle Velasquez MD      pantoprazole  40 mg Oral Early Morning Rito Sheridan, DO      polyethylene glycol  17 g Oral Daily Rito Sheridan, DO      prochlorperazine  10 mg Oral TID PRN Rito Sheridan, DO      senna  1 tablet Oral Daily Rito Sheridan DO      sodium bicarbonate  650 mg Oral BID after meals Shaun Banegas MD      sotalol  80 mg Oral Q12H STEPHIE Emmanuelle Velasquez MD      Sotorasib  960 mg Oral Daily Rito Sheridan,       torsemide  20 mg Oral BID Shaun Banegas MD      venlafaxine  100 mg Oral HS Marta Lazo MD          Today, Patient Was Seen By: Jeana Edwards MD    **Please Note: This note may have been constructed using a voice recognition system.**

## 2024-06-03 NOTE — ASSESSMENT & PLAN NOTE
Recent increased weakness with several recent falls. She endorses significant weight loss and poor appetite over the past 2 months. Lives at home, occasionally uses cane or walker but generally does not. Orthostatic vitals negative.  6/2 Significant decline in mobility status compared to previous PT session. Coordination disturbance on left side extremities noted by OT; repeat CT head was done 6/3    Plan  PT/OT - recommended inpatient rehab, patient amenable to going  Fall precautions  Increased risk due to sedation 2/2 gabapentin and remeron  D/c remeron for now  Consider restarting when renal function is back to baseline and sedation has decreased  Continue inpatient PT  Neurology consulted for worsening neurologic function and imbalance; see A/P under fall

## 2024-06-04 ENCOUNTER — APPOINTMENT (INPATIENT)
Dept: MRI IMAGING | Facility: HOSPITAL | Age: 74
DRG: 682 | End: 2024-06-04
Payer: MEDICARE

## 2024-06-04 LAB
ALBUMIN SERPL ELPH-MCNC: 2.45 G/DL (ref 3.2–5.1)
ALBUMIN SERPL ELPH-MCNC: 43.8 % (ref 48–70)
ALBUMIN UR ELPH-MCNC: 100 %
ALPHA1 GLOB MFR UR ELPH: 0 %
ALPHA1 GLOB SERPL ELPH-MCNC: 0.59 G/DL (ref 0.15–0.47)
ALPHA1 GLOB SERPL ELPH-MCNC: 10.6 % (ref 1.8–7)
ALPHA2 GLOB MFR UR ELPH: 0 %
ALPHA2 GLOB SERPL ELPH-MCNC: 1.06 G/DL (ref 0.42–1.04)
ALPHA2 GLOB SERPL ELPH-MCNC: 19 % (ref 5.9–14.9)
ANION GAP SERPL CALCULATED.3IONS-SCNC: 9 MMOL/L (ref 4–13)
B-GLOBULIN MFR UR ELPH: 0 %
BETA GLOB ABNORMAL SERPL ELPH-MCNC: 0.36 G/DL (ref 0.31–0.57)
BETA1 GLOB SERPL ELPH-MCNC: 6.5 % (ref 4.7–7.7)
BETA2 GLOB SERPL ELPH-MCNC: 6.9 % (ref 3.1–7.9)
BETA2+GAMMA GLOB SERPL ELPH-MCNC: 0.39 G/DL (ref 0.2–0.58)
BUN SERPL-MCNC: 25 MG/DL (ref 5–25)
CALCIUM SERPL-MCNC: 9 MG/DL (ref 8.4–10.2)
CHLORIDE SERPL-SCNC: 95 MMOL/L (ref 96–108)
CHOLEST SERPL-MCNC: 82 MG/DL
CO2 SERPL-SCNC: 30 MMOL/L (ref 21–32)
CREAT SERPL-MCNC: 1.23 MG/DL (ref 0.6–1.3)
ERYTHROCYTE [DISTWIDTH] IN BLOOD BY AUTOMATED COUNT: 19.7 % (ref 11.6–15.1)
GAMMA GLOB ABNORMAL SERPL ELPH-MCNC: 0.74 G/DL (ref 0.4–1.66)
GAMMA GLOB MFR UR ELPH: 0 %
GAMMA GLOB SERPL ELPH-MCNC: 13.2 % (ref 6.9–22.3)
GFR SERPL CREATININE-BSD FRML MDRD: 43 ML/MIN/1.73SQ M
GLUCOSE SERPL-MCNC: 164 MG/DL (ref 65–140)
GLUCOSE SERPL-MCNC: 173 MG/DL (ref 65–140)
GLUCOSE SERPL-MCNC: 179 MG/DL (ref 65–140)
GLUCOSE SERPL-MCNC: 201 MG/DL (ref 65–140)
GLUCOSE SERPL-MCNC: 234 MG/DL (ref 65–140)
HCT VFR BLD AUTO: 31.9 % (ref 34.8–46.1)
HDLC SERPL-MCNC: 27 MG/DL
HGB BLD-MCNC: 9.1 G/DL (ref 11.5–15.4)
IGG/ALB SER: 0.78 {RATIO} (ref 1.1–1.8)
INTERPRETATION UR IFE-IMP: NORMAL
KAPPA LC FREE SER-MCNC: 27.8 MG/L (ref 3.3–19.4)
KAPPA LC FREE/LAMBDA FREE SER: 0.85 {RATIO} (ref 0.26–1.65)
LAMBDA LC FREE SERPL-MCNC: 32.8 MG/L (ref 5.7–26.3)
LDLC SERPL CALC-MCNC: 38 MG/DL (ref 0–100)
MCH RBC QN AUTO: 22.1 PG (ref 26.8–34.3)
MCHC RBC AUTO-ENTMCNC: 28.5 G/DL (ref 31.4–37.4)
MCV RBC AUTO: 78 FL (ref 82–98)
PLATELET # BLD AUTO: 371 THOUSANDS/UL (ref 149–390)
PMV BLD AUTO: 10.6 FL (ref 8.9–12.7)
POTASSIUM SERPL-SCNC: 3.8 MMOL/L (ref 3.5–5.3)
PROT PATTERN SERPL ELPH-IMP: ABNORMAL
PROT PATTERN UR ELPH-IMP: NORMAL
PROT SERPL-MCNC: 5.6 G/DL (ref 6.4–8.2)
PROT UR-MCNC: 5.1 MG/DL
RBC # BLD AUTO: 4.11 MILLION/UL (ref 3.81–5.12)
SODIUM SERPL-SCNC: 134 MMOL/L (ref 135–147)
TRIGL SERPL-MCNC: 84 MG/DL
WBC # BLD AUTO: 12.45 THOUSAND/UL (ref 4.31–10.16)

## 2024-06-04 PROCEDURE — 99232 SBSQ HOSP IP/OBS MODERATE 35: CPT | Performed by: INTERNAL MEDICINE

## 2024-06-04 PROCEDURE — 97530 THERAPEUTIC ACTIVITIES: CPT

## 2024-06-04 PROCEDURE — 84165 PROTEIN E-PHORESIS SERUM: CPT | Performed by: PATHOLOGY

## 2024-06-04 PROCEDURE — 80061 LIPID PANEL: CPT | Performed by: NURSE PRACTITIONER

## 2024-06-04 PROCEDURE — 80048 BASIC METABOLIC PNL TOTAL CA: CPT

## 2024-06-04 PROCEDURE — 84166 PROTEIN E-PHORESIS/URINE/CSF: CPT | Performed by: PATHOLOGY

## 2024-06-04 PROCEDURE — 97110 THERAPEUTIC EXERCISES: CPT

## 2024-06-04 PROCEDURE — 99233 SBSQ HOSP IP/OBS HIGH 50: CPT | Performed by: PSYCHIATRY & NEUROLOGY

## 2024-06-04 PROCEDURE — 70551 MRI BRAIN STEM W/O DYE: CPT

## 2024-06-04 PROCEDURE — NC001 PR NO CHARGE: Performed by: INTERNAL MEDICINE

## 2024-06-04 PROCEDURE — 85027 COMPLETE CBC AUTOMATED: CPT

## 2024-06-04 PROCEDURE — 82948 REAGENT STRIP/BLOOD GLUCOSE: CPT

## 2024-06-04 RX ORDER — SENNOSIDES 8.6 MG
2 TABLET ORAL DAILY
Status: DISCONTINUED | OUTPATIENT
Start: 2024-06-05 | End: 2024-06-07

## 2024-06-04 RX ADMIN — GABAPENTIN 200 MG: 100 CAPSULE ORAL at 08:09

## 2024-06-04 RX ADMIN — ASPIRIN 81 MG: 81 TABLET, COATED ORAL at 08:09

## 2024-06-04 RX ADMIN — SOTALOL HYDROCHLORIDE 80 MG: 80 TABLET ORAL at 21:13

## 2024-06-04 RX ADMIN — TORSEMIDE 20 MG: 20 TABLET ORAL at 21:13

## 2024-06-04 RX ADMIN — BUSPIRONE HYDROCHLORIDE 7.5 MG: 5 TABLET ORAL at 21:13

## 2024-06-04 RX ADMIN — APIXABAN 5 MG: 5 TABLET, FILM COATED ORAL at 08:09

## 2024-06-04 RX ADMIN — APIXABAN 5 MG: 5 TABLET, FILM COATED ORAL at 17:22

## 2024-06-04 RX ADMIN — INSULIN LISPRO 2 UNITS: 100 INJECTION, SOLUTION INTRAVENOUS; SUBCUTANEOUS at 17:22

## 2024-06-04 RX ADMIN — POLYETHYLENE GLYCOL 3350 17 G: 17 POWDER, FOR SOLUTION ORAL at 08:09

## 2024-06-04 RX ADMIN — INSULIN LISPRO 1 UNITS: 100 INJECTION, SOLUTION INTRAVENOUS; SUBCUTANEOUS at 11:33

## 2024-06-04 RX ADMIN — FOLIC ACID 1 MG: 1 TABLET ORAL at 08:09

## 2024-06-04 RX ADMIN — SOTORASIB 960 MG: 120 TABLET, COATED ORAL at 08:10

## 2024-06-04 RX ADMIN — METOPROLOL TARTRATE 50 MG: 50 TABLET, FILM COATED ORAL at 21:13

## 2024-06-04 RX ADMIN — VENLAFAXINE 100 MG: 25 TABLET ORAL at 21:15

## 2024-06-04 RX ADMIN — PANTOPRAZOLE SODIUM 40 MG: 40 TABLET, DELAYED RELEASE ORAL at 05:41

## 2024-06-04 RX ADMIN — ATORVASTATIN CALCIUM 40 MG: 40 TABLET, FILM COATED ORAL at 08:09

## 2024-06-04 RX ADMIN — SOTALOL HYDROCHLORIDE 80 MG: 80 TABLET ORAL at 08:09

## 2024-06-04 RX ADMIN — SENNOSIDES 8.6 MG: 8.6 TABLET, FILM COATED ORAL at 08:09

## 2024-06-04 RX ADMIN — OXYCODONE HYDROCHLORIDE 5 MG: 5 TABLET ORAL at 15:38

## 2024-06-04 RX ADMIN — INSULIN LISPRO 1 UNITS: 100 INJECTION, SOLUTION INTRAVENOUS; SUBCUTANEOUS at 08:11

## 2024-06-04 RX ADMIN — SODIUM BICARBONATE 650 MG: 650 TABLET ORAL at 08:10

## 2024-06-04 RX ADMIN — Medication 3 MG: at 21:13

## 2024-06-04 RX ADMIN — METOPROLOL TARTRATE 50 MG: 50 TABLET, FILM COATED ORAL at 08:09

## 2024-06-04 RX ADMIN — INSULIN LISPRO 1 UNITS: 100 INJECTION, SOLUTION INTRAVENOUS; SUBCUTANEOUS at 21:21

## 2024-06-04 RX ADMIN — TORSEMIDE 20 MG: 20 TABLET ORAL at 08:09

## 2024-06-04 RX ADMIN — VITAM B12 100 MCG: 100 TAB at 08:09

## 2024-06-04 NOTE — PHYSICAL THERAPY NOTE
PHYSICAL THERAPY NOTE          Patient Name: Melany Griffith  Today's Date: 6/4/2024 06/04/24 1441   PT Last Visit   PT Visit Date 06/04/24   Note Type   Note Type Treatment   Pain Assessment   Pain Assessment Tool 0-10   Pain Score 8   Pain Location/Orientation Orientation: Right;Location: Shoulder  (attempted to call RN via phone, no answer. Tiger text RN in reference to pt 8/10 R shoulder pain)   Pain Onset/Description Onset: Gradual   Effect of Pain on Daily Activities limited standing tolerance and functional mobility   Patient's Stated Pain Goal No pain   Hospital Pain Intervention(s) Repositioned;Ambulation/increased activity;Elevated;Emotional support;Rest   Multiple Pain Sites No   Restrictions/Precautions   Weight Bearing Precautions Per Order No   Other Precautions Cognitive;Chair Alarm;Bed Alarm;Fall Risk;Pain  (masimo)   General   Chart Reviewed Yes   Additional Pertinent History pt with urinary incontinence in todays tx sesison and required cleaning and changing of gown and socks   Response to Previous Treatment Other (Comment)  (pt reports R shoulder pain 8/10 but able to participate in PT intervention)   Family/Caregiver Present No   Cognition   Overall Cognitive Status Impaired   Arousal/Participation Alert;Responsive;Cooperative   Attention Attends with cues to redirect   Orientation Level Oriented X4   Memory Decreased short term memory;Decreased recall of recent events;Decreased recall of precautions   Following Commands Follows one step commands with increased time or repetition   Comments pt required Vc's to keep her eyes open during SPT from commode to recliner   Subjective   Subjective pt was agreeable to participate in PT intervention., pt initially stated 0/10 pain but ststaed 8/10 R shoulder pain with SPT   Bed Mobility   Supine to Sit 2  Maximal assistance   Additional items Assist x 1;HOB  elevated;Bedrails;Increased time required;Verbal cues;Other;LE management  (trunk management)   Sit to Supine Unable to assess   Additional Comments pt seated OOB in yadi recliner post tx session with call bell, legs elevated, chair alarm activated and all pt needs met   Transfers   Sit to Stand 2  Maximal assistance   Additional items Assist x 1;Increased time required;Verbal cues  (to RW)   Stand to Sit 2  Maximal assistance   Additional items Assist x 1;Armrests;Increased time required;Verbal cues  (w/ RW)   Stand pivot 2  Maximal assistance   Additional items Assist x 1;Armrests;Increased time required;Verbal cues  (w/ RW and RW management)   Additional Comments pt contiunues to require RW and max Ax1 for all functional transfers in todays tx session   Ambulation/Elevation   Gait pattern (S)  Not appropriate  (pt required max Ax1 for all functional transfers to and from RW and was limited w/ standing tolerance to 40 seconds)   Balance   Static Sitting Fair   Dynamic Sitting Fair -   Static Standing Poor +   Dynamic Standing Poor   Ambulatory Zero   Endurance Deficit   Endurance Deficit Yes   Endurance Deficit Description limited bed mobility, functional mobility, activity/standing tolerance and ambulation   Activity Tolerance   Activity Tolerance Patient limited by fatigue;Other (Comment)  (generalized wealness, urinary incontinence)   Nurse Made Aware reached out to RN via tiger text   Exercises   Quad Sets Supine;10 reps;AROM;Bilateral   Hip Abduction Sitting;10 reps;AROM;Bilateral   Hip Adduction Sitting;10 reps;AROM;Bilateral  (pillow squeezes , EOB)   Knee AROM Long Arc Quad Sitting;10 reps;AROM;Bilateral   Ankle Pumps Sitting;20 reps;AROM;Bilateral  (long sit position)   Marching Sitting;10 reps;AROM;Bilateral   Assessment   Prognosis Good   Problem List Decreased strength;Decreased endurance;Impaired balance;Decreased mobility;Decreased cognition;Impaired judgement;Decreased safety awareness;Decreased  range of motion   Assessment pt began tx session lying supine in the bed as pt was agreeable to participate in PT intervention. PT intervention to focus on bed mobility, transfer training, TE activities and increasing activity tolerance and endurance. In comparison to previous PT intervention pt continues to remain consistant for requiring max ax1 foir all bed mobility and functional transfers to and from RW. pt required LE and trunk management in order to complete a supine<>sit EOB transfer. pt was able to sit EOB and perform TE activities w/o LOB as pt did utilized bed rail to assist with sitting balance. pt required Vc's for hand placement while ascending to RW and descending to commode/recliner. pt remains limited with activity and standing tolerance as pt was limited to 40 seconds of static standing but required min to mod ax1 for safety and balance. pt remains at an increased risk for falls and injuries with all OOB activities at this time. pt would benefit from continued skilled PT intervention in order to address deficits listed above. Continue to recommend DC w/ level 2 moderate rehab resource intensity when medically cleared. Post tx pt in recliner iwth call bell, legs elevated, chair alarm activated and all pt needs met   Goals   Patient Goals to get some rest OOB   STG Expiration Date 06/08/24   PT Treatment Day 2   Plan   Treatment/Interventions Functional transfer training;LE strengthening/ROM;Therapeutic exercise;Endurance training;Cognitive reorientation;Patient/family training;Equipment eval/education;Bed mobility;Gait training;Spoke to nursing;Compensatory technique education   Progress No functional improvements   PT Frequency 3-5x/wk   Discharge Recommendation   Rehab Resource Intensity Level, PT II (Moderate Resource Intensity)   Equipment Recommended Walker   Walker Package Recommended Wheeled walker   Change/add to Walker Package? No   AM-PAC Basic Mobility Inpatient   Turning in Flat Bed  Without Bedrails 2   Lying on Back to Sitting on Edge of Flat Bed Without Bedrails 2   Moving Bed to Chair 2   Standing Up From Chair Using Arms 2   Walk in Room 1   Climb 3-5 Stairs With Railing 1   Basic Mobility Inpatient Raw Score 10   [facility] Highest Level Of Mobility   -HLM Goal 4: Move to chair/commode   -HLM Achieved 4: Move to chair/commode   Education   Education Provided Mobility training;Assistive device;Other  (bed mobility, functional transfers)   Patient Reinforcement needed   End of Consult   Patient Position at End of Consult Bedside chair;Bed/Chair alarm activated;All needs within reach   The patient's AM-PAC Basic Mobility Inpatient Short Form Raw Score is 10. A Raw score of less than or equal to 16 suggests the patient may benefit from discharge to post-acute rehabilitation services. Please also refer to the recommendation of the Physical Therapist for safe discharge planning.    James Ro

## 2024-06-04 NOTE — ASSESSMENT & PLAN NOTE
Malnutrition Findings:   Adult Malnutrition type: Chronic illness  Adult Degree of Malnutrition: Other severe protein calorie malnutrition  Malnutrition Characteristics: Weight loss, Inadequate energy, Muscle loss                  360 Statement: Chronic/severe malnutrition r/t decreased PO intake/condition, as evidenced by 9.5% wt loss x 3 months (5/21/24: 152#, 2/27/24: 168#),  intake meeting <75% of estimated needs for > 1 month, and severe muscle mass depletion (temporal region). Treatment: liberal PO diet + nutrition supplements    BMI Findings:           Body mass index is 27.58 kg/m².   Remeron 7.5 tonight for appetite, sleep and mood  D/c remeron given sedation. Consider restarting outpatient once renal function stable

## 2024-06-04 NOTE — PROGRESS NOTES
[facility]  Progress Note  Name: Melany Griffith I  MRN: [id number]  Unit/Bed#: S -01 I Date of Admission: 5/28/2024   Date of Service: 6/4/2024 I Hospital Day: 6    Assessment & Plan   * ADITYA on CKD  Assessment & Plan  Lab Results   Component Value Date    EGFR 43 06/04/2024    EGFR 46 06/03/2024    EGFR 45 06/02/2024    CREATININE 1.23 06/04/2024    CREATININE 1.16 06/03/2024    CREATININE 1.17 06/02/2024       POA Cr 1.57. Baseline appears to be around 1. Consider most likely secondary to hypovolemia given that she endorses poor PO intake recently, as well as recent increase in prn lasix use for lower extremity swelling. Consider swelling most likely a medication side effect from Sotorasib.     Plan  S/p Gentle hydration NS  UA: moderate leuks, asymptomatic, will not treat at this time  Urine sodium: 17  Urine creatinine: 144, crt:albumin ratio of 25  Bladder scan/urinary retention protocol  Restarted home sotolol, on metoprolol.   Home ARB held at this time, restart as BP permits  Per family, patient on valsartan 160 mg DAILY, not bid as listed in med rec  Promote normotension, avoid nephrotoxins  Creatinine improving; likely to be cardiorenal per nephrology  Lasix switched to torsemide 20 mg BID, as recommended by nephrology    Plan:  Continue torsemide  No indication to give IV fluids prior to MRI with contrast, as recommended by nephrology    Hyponatremia  Assessment & Plan  Recent Labs     06/02/24 2055 06/03/24  0536 06/04/24  0530   SODIUM 129* 133* 134*       Will consult nephrology given worsening  Etiology per nephro: Hyponatremia most likely due to SIADH as patient on  mirtazapine, Effexor and buspirone , PPI which could cause SIADH and hyponatremia.   Has underlying stage IV lung carcinoma which could cause SIADH.  Also has component of fluid overload   S/p Tolvaptan x2  Urine osmolality 162 after tolvaptan; Likely diluted  Sodium currently stable      Plan:  Continue fluid restriction  Continue torsemide PO 20 mg twice daily    Lower extremity edema  Assessment & Plan  Chronic but worsening recently. She does have a history of grade 1 diastolic dysfunction, but denies any recent SOB. Consider most likely secondary to Sotorasib use as this has been a reported side effect and pt does not appear to be in CHF exacerbation.     Plan  Apply compression stockings  B/L lower extremity doppler wnl, no clots  Continue Sotorasib for now  Lasix 40 mg IV BID switched to torsemide 20 mg twice daily    Sepsis (HCC)  Assessment & Plan  Meeting SIRS with HR 90s and WBC >12. Suspect urinary source given UA on admission. Patient unreliable and unable to give symptomatic history.   Treat UTI with ceftriaxone; 3 day course completed on 6/2   s/p IV hydration, blood pressures supported without further fluids  Trend WBC daily and vitals per unit routine    Ambulatory dysfunction  Assessment & Plan  Recent increased weakness with several recent falls. She endorses significant weight loss and poor appetite over the past 2 months. Lives at home, occasionally uses cane or walker but generally does not. Orthostatic vitals negative.  6/2 Significant decline in mobility status compared to previous PT session. Coordination disturbance on left side extremities noted by OT; repeat CT head was done 6/3    Plan  PT/OT - recommended inpatient rehab, patient amenable to going  Fall precautions  Increased risk due to sedation 2/2 gabapentin and remeron  D/c remeron for now  Consider restarting when renal function is back to baseline and sedation has decreased  Continue inpatient PT  Neurology consulted for worsening neurologic function and imbalance; see A/P under fall      Chronic diastolic heart failure (HCC)  Assessment & Plan  Wt Readings from Last 3 Encounters:   06/04/24 68.4 kg (150 lb 12.7 oz)   05/21/24 69.3 kg (152 lb 12.8 oz)   05/20/24 71.4 kg (157 lb 8 oz)     Last Echo in 2022  showed EF 60, grade 1 diastolic dysfunction.   Lungs clear on exam, no SOB. 3+ lower extremitiy edema on exam, consider likely medication side effect from Sotorasib.   Home Lasix recently increased to 40 mg daily prn for swelling likely causing ADITYA  S/p Gentle hydration for ADITYA  Continues to have bilateral lower extremity edema; chest clear to auscultation.  No concerns for volume overload this time    Plan:  Lasix switched to torsemide 20 mg twice daily    Adenocarcinoma of lung, stage 4 (HCC)  Assessment & Plan  S/p chemo, radiation, and immunotherapy. Follows w/ Dr. Sam outpatient. Currently on Sotorasib only.     Plan  Will provide sotorasib from home    Severe protein-calorie malnutrition (HCC)  Assessment & Plan  Malnutrition Findings:   Adult Malnutrition type: Chronic illness  Adult Degree of Malnutrition: Other severe protein calorie malnutrition  Malnutrition Characteristics: Weight loss, Inadequate energy, Muscle loss                  360 Statement: Chronic/severe malnutrition r/t decreased PO intake/condition, as evidenced by 9.5% wt loss x 3 months (5/21/24: 152#, 2/27/24: 168#),  intake meeting <75% of estimated needs for > 1 month, and severe muscle mass depletion (temporal region). Treatment: liberal PO diet + nutrition supplements    BMI Findings:           Body mass index is 27.58 kg/m².   Remeron 7.5 tonight for appetite, sleep and mood  D/c remeron given sedation. Consider restarting outpatient once renal function stable    Sacral wound  Assessment & Plan  Small wound present in the R sacral/buttock area. Small amount of scabbing present, no current discharge. Low suspicion of infection.     Plan  Wound care consult  No need for abx at this time    Acute pain of right shoulder  Assessment & Plan  S/p fall. Trauma XR's all negative. Tylenol 975 prn.     Fall  Assessment & Plan  S/p mechanical fall with no LOC. On eliquis. Sustained hit to back of the head and R shoulder. Trauma workup negative.  Not currently in any pain.   Reported left hip and left knee pain; X-rays were ordered  Due to worsening neurologic function including imbalance and poor coordination of left side extremities CT head was repeated on 6/3 which was unremarkable and neurology was consulted    Plan  Tylenol prn for pain control  PT/OT recommending PAR, which patient agrees is a good idea  Fall precautions  Neurology consulted, recs appreciated:  MRI brain without contrast pending    Constipation  Assessment & Plan  She notes ongoing issues with constipation at home. Miralax and Senna both ordered daily. Increase/reduce as needed.     Type 2 diabetes mellitus with stage 3 chronic kidney disease, without long-term current use of insulin, unspecified whether stage 3a or 3b CKD (HCC)  Assessment & Plan  Lab Results   Component Value Date    HGBA1C 7.2 (H) 05/08/2024       Recent Labs     06/03/24  0712 06/03/24  1053 06/03/24  1613 06/03/24  2118   POCGLU 216* 203* 170* 177*       Blood Sugar Average: Last 72 hrs:  (P) 185.5    Hold home medications. SSI algorithm 1, titrate as needed.     Paroxysmal atrial fibrillation (HCC)  Assessment & Plan  Continue home eliquis, sotalol. Metoprolol tartrate increased to 50 BID for better HR contol.            VTE Pharmacologic Prophylaxis: VTE Score: 7 High Risk (Score >/= 5) - Pharmacological DVT Prophylaxis Ordered: apixaban (Eliquis). Sequential Compression Devices Ordered.    Mobility:   Basic Mobility Inpatient Raw Score: 10  JH-HLM Goal: 4: Move to chair/commode  JH-HLM Achieved: 6: Walk 10 steps or more  JH-HLM Goal NOT achieved. Continue with multidisciplinary rounding and encourage appropriate mobility to improve upon JH-HLM goals.    Patient Centered Rounds: I performed bedside rounds with nursing staff today.  Discussions with Specialists or Other Care Team Provider: Neurology, PT, IM attending    Education and Discussions with Family / Patient: Updated  (daughter) via  phone.    Current Length of Stay: 6 day(s)  Current Patient Status: Inpatient   Discharge Plan: Anticipate discharge in 24-48 hrs to [facility]    Code Status: Level 3 - DNAR and DNI    Subjective:   Patient seen and evaluated at bedside.  Patient reports feeling better.  She states that her lower extremity pain is only when she works with physical therapy.  Denies pain while laying down.  Denies dizziness, lightheadedness, weakness.  No overnight events reported by the nurse.    Objective:     Vitals:   Temp (24hrs), Av.1 °F (36.2 °C), Min:96.4 °F (35.8 °C), Max:97.8 °F (36.6 °C)    Temp:  [96.4 °F (35.8 °C)-97.8 °F (36.6 °C)] 97 °F (36.1 °C)  HR:  [62-80] 74  BP: (111-125)/(64-78) 125/77  SpO2:  [87 %-98 %] 98 %  Body mass index is 27.58 kg/m².     Input and Output Summary (last 24 hours):     Intake/Output Summary (Last 24 hours) at 2024 1342  Last data filed at 2024 0900  Gross per 24 hour   Intake 140 ml   Output --   Net 140 ml         Physical Exam:   Physical Exam  Constitutional:       Appearance: Normal appearance. She is not ill-appearing.   HENT:      Head: Normocephalic and atraumatic.   Cardiovascular:      Rate and Rhythm: Normal rate and regular rhythm.      Pulses: Normal pulses.      Heart sounds: Normal heart sounds.   Pulmonary:      Effort: Pulmonary effort is normal.      Breath sounds: Normal breath sounds.   Abdominal:      General: Abdomen is flat. Bowel sounds are normal.      Palpations: Abdomen is soft.   Skin:     Capillary Refill: Capillary refill takes less than 2 seconds.   Neurological:      General: No focal deficit present.      Mental Status: She is alert. Mental status is at baseline.      Cranial Nerves: No cranial nerve deficit.      Sensory: No sensory deficit.      Motor: No weakness.      Coordination: Coordination normal.      Comments: Pleasantly confused (baseline); not confused          Additional Data:     Labs:  Results from last 7 days   Lab Units 24  2617  24  0536 24  0643 24  0429   WBC Thousand/uL 12.45* 14.97*   < > 14.51*   HEMOGLOBIN g/dL 9.1* 9.8*   < > 8.8*   HEMATOCRIT % 31.9* 33.4*   < > 30.7*   PLATELETS Thousands/uL 371 371   < > 376   BANDS PCT %  --   --   --  1   SEGS PCT %  --  89*   < >  --    LYMPHO PCT %  --  5*   < > 6*   MONO PCT %  --  5   < > 6   EOS PCT %  --  0   < > 0    < > = values in this interval not displayed.     Results from last 7 days   Lab Units 24  0530 24  1605 24  1602   SODIUM mmol/L 134*   < > 130*   POTASSIUM mmol/L 3.8   < > 4.8   CHLORIDE mmol/L 95*   < > 95*   CO2 mmol/L 30   < > 23   CO2, I-STAT   --    < >  --    BUN mg/dL 25   < > 37*   CREATININE mg/dL 1.23   < > 1.57*   ANION GAP mmol/L 9   < > 12   CALCIUM mg/dL 9.0   < > 9.2   ALBUMIN g/dL  --   --  3.2*   TOTAL BILIRUBIN mg/dL  --   --  0.57   ALK PHOS U/L  --   --  95   ALT U/L  --   --  11   AST U/L  --   --  13   GLUCOSE RANDOM mg/dL 164*   < > 118    < > = values in this interval not displayed.         Results from last 7 days   Lab Units 24  1103 24  0839 248 24  1613 24  1053 24  0712 24  2116 24  1614 24  1117 24  0738 24  1527   POC GLUCOSE mg/dl 173* 201* 177* 170* 203* 216* 235* 175* 202* 158* 166* 187*               Lines/Drains:  Invasive Devices       Central Venous Catheter Line  Duration             Port A Cath 21 Right Chest 903 days              Peripheral Intravenous Line  Duration             Peripheral IV 24 Right Antecubital 1 day                    Central Line:  Goal for removal:  Chronic         Telemetry:  Telemetry Orders (From admission, onward)               24 Hour Telemetry Monitoring  Continuous x 24 Hours (Telem)           Question:  Reason for 24 Hour Telemetry  Answer:  Arrhythmias requiring acute medical intervention / PPM or ICD malfunction                     Telemetry Reviewed: Sinus  Tachycardia  Indication for Continued Telemetry Use: Arrthymias requiring medical therapy             Imaging: Reviewed radiology reports from this admission including: chest xray, CT head, and CT spine and cervical    Recent Cultures (last 7 days):         Last 24 Hours Medication List:   Current Facility-Administered Medications   Medication Dose Route Frequency Provider Last Rate    acetaminophen  975 mg Oral Q8H PRN Rito Granadoes, DO      apixaban  5 mg Oral BID Rito Fatimah, DO      aspirin  81 mg Oral Daily Rito Loyjeremi, DO      atorvastatin  40 mg Oral Daily Rito Fatimah, DO      busPIRone  7.5 mg Oral HS Rito Fatimah, DO      cyanocobalamin  100 mcg Oral Daily Rito Simes, DO      folic acid  1 mg Oral Daily Rito Loyjeremi, DO      gabapentin  200 mg Oral BID Rito Sheridan, DO      insulin lispro  1-5 Units Subcutaneous TID AC Rito Sheridan, DO      insulin lispro  1-5 Units Subcutaneous HS Rito hSeridan, DO      melatonin  3 mg Oral HS Marta Lazo MD      metoprolol tartrate  50 mg Oral Q12H Emmanuelle Velasquez MD      oxyCODONE  2.5 mg Oral Q4H PRN Kyle Brunner, MD      Or    oxyCODONE  5 mg Oral Q4H PRN Kyle Brunner, MD      pantoprazole  40 mg Oral Early Morning Rito Sheridan, DO      polyethylene glycol  17 g Oral Daily Rito Sheridan, DO      prochlorperazine  10 mg Oral TID PRN Rito Sheridan, DO      senna  1 tablet Oral Daily Rito oLyjeremi, DO      sotalol  80 mg Oral Q12H STEPHIE Emmanuelle Velasquez MD      Sotorasib  960 mg Oral Daily Rito Sheridan, DO      torsemide  20 mg Oral BID Shaun Banegas MD      venlafaxine  100 mg Oral HS Marta Lazo MD          Today, Patient Was Seen By: Jeana Edwards MD    **Please Note: This note may have been constructed using a voice recognition system.**

## 2024-06-04 NOTE — ASSESSMENT & PLAN NOTE
73-year-old female with Afib on Eliquis, arthritis, DM, HTN, GERD, HLD, stage IV lung cancer s/p chemo radiation, CHF, prior stroke with residual memory deficit, who was admitted approximately a week ago 5/28 after having a fall at home.  RR was called on 6/2 because the staff heard a noise and found her on the floor.  Patient reports that she had wanted to stand up as she wondered if she had accidentally urinated or wet herself.  Apparently when she went to stand up she found that she was weak, which caused her fall but did not have head strike.  Due to LLE weakness noted, Neurology was consulted for further evaluation. Unclear etiology at this time- attempted to obtain MRI brain and c-spine but unable to due to patient not being able to tolerate laying still for it, even with pre-medication. DWI sequence was obtained during first attempt at MRI brain and was reviewed by attending neurologist- no acute infarct noted. Alk phos and kidney function trending up as of 6/5. Workup as noted below.    Workup:  -5/28 CT head: No acute intracranial abnormality.  -5/28 CT cervical spine: No cervical spine fracture or traumatic malalignment.   -6/2 CT head: No acute intracranial abnormality.  -6/2 CT cervical spine: No cervical spine fracture or traumatic malalignment.   -6/2 CT thoracic spine: No acute osseous pathology. New, small right pleural effusion. May be nontraumatic. No rib fracture, lung contusion or pneumothorax.  -6/3 CT head: No acute intracranial abnormality.  -6/3 L hip xray: No acute osseous abnormality.   -6/3 L knee xray: No acute osseous abnormality.   -MRI brain:  1.  No acute infarct. Sequela of chronic infarcts in the right corpus striatum and left putamen.  2.  Cannot reliably evaluate the brain parenchyma due to motion distorted image quality in the FLAIR sequences and lack of T2 sequence. Consider repeat imaging in more stable state and/or with sedative/anxiolytic medications.  -Labs: TSH 1.576,  5/8/24 A1c 7.2, LDL 38    Plan:  - Stroke pathway  MRI brain and cervical spine w/wo contrast  Discussed with attending neurologist- may hold off at this time as patient was unable to tolerate MRI even with pre-medication with Ativan. If patient's myoclonus does not improve while kidney function improves, then would recommend attempting MRI again with different pre-medication (possibly Seroquel).  No acute infarct on DWI sequence; hold off echo  Aspirin 81 mg   Atorvastatin 40 mg  Goal normotension; avoid hypotension  Continue telemetry  PT/OT/ST  Frequent neuro checks. Continue to monitor and notify neurology with any changes.   - Eliquis 5 mg BID per home regimen  - Medical management and supportive care per primary team. Correction of any metabolic or infectious disturbances.   - No further inpatient neurology recommendations at this time. Please call with any questions.

## 2024-06-04 NOTE — PLAN OF CARE
Problem: PHYSICAL THERAPY ADULT  Goal: Performs mobility at highest level of function for planned discharge setting.  See evaluation for individualized goals.  Description: Treatment/Interventions: Functional transfer training, LE strengthening/ROM, Therapeutic exercise, Endurance training, Cognitive reorientation, Patient/family training, Equipment eval/education, Gait training, Bed mobility, Compensatory technique education  Equipment Recommended: Walker       See flowsheet documentation for full assessment, interventions and recommendations.  Outcome: Not Progressing  Note: Prognosis: Good  Problem List: Decreased strength, Decreased endurance, Impaired balance, Decreased mobility, Decreased cognition, Impaired judgement, Decreased safety awareness, Decreased range of motion  Assessment: pt began tx session lying supine in the bed as pt was agreeable to participate in PT intervention. PT intervention to focus on bed mobility, transfer training, TE activities and increasing activity tolerance and endurance. In comparison to previous PT intervention pt continues to remain consistant for requiring max ax1 foir all bed mobility and functional transfers to and from RW. pt required LE and trunk management in order to complete a supine<>sit EOB transfer. pt was able to sit EOB and perform TE activities w/o LOB as pt did utilized bed rail to assist with sitting balance. pt required Vc's for hand placement while ascending to RW and descending to commode/recliner. pt remains limited with activity and standing tolerance as pt was limited to 40 seconds of static standing but required min to mod ax1 for safety and balance. pt remains at an increased risk for falls and injuries with all OOB activities at this time. pt would benefit from continued skilled PT intervention in order to address deficits listed above. Continue to recommend DC w/ level 2 moderate rehab resource intensity when medically cleared. 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

## 2024-06-04 NOTE — ASSESSMENT & PLAN NOTE
Lab Results   Component Value Date    EGFR 43 06/04/2024    EGFR 46 06/03/2024    EGFR 45 06/02/2024    CREATININE 1.23 06/04/2024    CREATININE 1.16 06/03/2024    CREATININE 1.17 06/02/2024       POA Cr 1.57. Baseline appears to be around 1. Consider most likely secondary to hypovolemia given that she endorses poor PO intake recently, as well as recent increase in prn lasix use for lower extremity swelling. Consider swelling most likely a medication side effect from Sotorasib.     Plan  S/p Gentle hydration NS  UA: moderate leuks, asymptomatic, will not treat at this time  Urine sodium: 17  Urine creatinine: 144, crt:albumin ratio of 25  Bladder scan/urinary retention protocol  Restarted home sotolol, on metoprolol.   Home ARB held at this time, restart as BP permits  Per family, patient on valsartan 160 mg DAILY, not bid as listed in med rec  Promote normotension, avoid nephrotoxins  Creatinine improving; likely to be cardiorenal per nephrology  Lasix switched to torsemide 20 mg BID, as recommended by nephrology    Plan:  Continue torsemide  No indication to give IV fluids prior to MRI with contrast, as recommended by nephrology

## 2024-06-04 NOTE — PROGRESS NOTES
Progress Note - Neurology   Melany Griffith 73 y.o. female [id number]  Unit/Bed#: S /S -01    Assessment:    Fall  Assessment & Plan  73-year-old female with Afib on Eliquis, arthritis, DM, HTN, GERD, HLD, stage IV lung cancer s/p chemo radiation, CHF, prior stroke with residual memory deficit, who was admitted approximately a week ago 5/28 after having a fall at home.  RR was called on 6/2 because the staff heard a noise and found her on the floor.  Patient reports that she had wanted to stand up as she wondered if she had accidentally urinated or wet herself.  Apparently when she went to stand up she found that she was weak, which caused her fall but did not have head strike.  Due to LLE weakness noted, Neurology was consulted for further evaluation. Unclear etiology at this time. Workup as noted below.    Workup:  -5/28 CT head: No acute intracranial abnormality.  -5/28 CT cervical spine: No cervical spine fracture or traumatic malalignment.   -6/2 CT head: No acute intracranial abnormality.  -6/2 CT cervical spine: No cervical spine fracture or traumatic malalignment.   -6/2 CT thoracic spine: No acute osseous pathology. New, small right pleural effusion. May be nontraumatic. No rib fracture, lung contusion or pneumothorax.  -6/3 CT head: No acute intracranial abnormality.  -6/3 L hip xray: No acute osseous abnormality.   -6/3 L knee xray: No acute osseous abnormality.   -Labs: TSH 1.576, 5/8/24 A1c 7.2    Plan:  - Stroke pathway  MRI brain and cervical spine w/wo contrast  Hold off on echo- if MRI with acute infarct, then will obtain.  Lipid Panel  Aspirin 81 mg   Atorvastatin 40 mg  Goal normotension; avoid hypotension  Continue telemetry  PT/OT/ST  Frequent neuro checks. Continue to monitor and notify neurology with any changes.   - Eliquis 5 mg BID per home regimen  - Medical management and supportive care per primary team. Correction of any metabolic or infectious disturbances.         Recommendations for outpatient neurological follow up have yet to be determined.    Case and treatment plan reviewed with attending neurologist, Dr. Osborne. Please see attending attestation for any further recommendations.    Subjective:   Patient seen and evaluated with attending neurologist. Patient reports L groin pain when attempting to lift LLE. She reports intermittent neck pain. She denies any incontinence.      Past Medical History:   Diagnosis Date    A-fib (HCC)     Arthritis     Atrial fibrillation (HCC)     Confusion     Diabetes mellitus (HCC)     Diabetes mellitus type 2, uncomplicated (HCC)     Last assessed: 8/17/17    Encephalopathy 1/6/2022    Essential hypertension     Frozen shoulder     L shoulder    GERD (gastroesophageal reflux disease)     Hx of cancer of uterus     Last assessed: 8/21/15    Hyperlipidemia     Hypertension     Hyponatremia 11/16/2016    Lung mass     diagnosed 9/2016    Malignant neoplasm without specification of site (HCC)     Skin cancer, basal cell     right eye area    Stage 4 lung cancer (HCC)     Thrombocytopenia, unspecified (HCC) 1/20/2023     Past Surgical History:   Procedure Laterality Date    APPENDECTOMY      BRONCHOSCOPY N/A 11/17/2016    Procedure: BRONCHOSCOPY FLEXIBLE;  Surgeon: Giles Alonso MD;  Location: [facility];  Service:     CHOLECYSTECTOMY      COLONOSCOPY      COLONOSCOPY      FL GUIDED CENTRAL VENOUS ACCESS DEVICE INSERTION  12/14/2021    GALLBLADDER SURGERY      HYSTERECTOMY  2000    Total abdominal    JOINT REPLACEMENT      LARYNGOSCOPY      Flexible Fiberoptic, (Therapeutic), Resolved: 11/17/16    MOHS SURGERY      Micrographic Surgery Face    DE [facility] INCL FLUOR GDNCE DX W/CELL WASHG SPX N/A 5/24/2017    Procedure: BRONCHOSCOPY FLEXIBLE;  Surgeon: Denzel Manning MD;  Location: [facility];  Service: Pulmonary    DE BRONCHOSCOPY NEEDLE BX TRACHEA MAIN STEM&/BRON N/A 11/17/2016    Procedure: EBUS; FROZEN SECTION ;  Surgeon: Giles Alonso MD;   Location: [facility];  Service: Thoracic    OR INSJ TUNNELED CTR VAD W/SUBQ PORT AGE 5 YR/> N/A 2021    Procedure: INSERTION VENOUS PORT ( PORT-A-CATH) IR;  Surgeon: Hansel Garduno DO;  [facility];  Service: Interventional Radiology    TONSILECTOMY AND ADNOIDECTOMY      TONSILLECTOMY      TOTAL KNEE ARTHROPLASTY Right 2014     Family History   Problem Relation Age of Onset    Heart disease Father         cardiac disorder    Hypertension Father     Arthritis Father     Stroke Father         cerebrovascular accident    Skin cancer Father     Diabetes Mother     Heart disease Mother     Dementia Mother     Hypertension Mother     Thyroid disease Mother     Cancer Maternal Grandfather         of unknown origin    Cancer Family     Depression Family     Diabetes Family     Hyperlipidemia Family         essential    Heart disease Family     Hypertension Family     Stroke Family         syndrome    Lung cancer Paternal Uncle     Muscular dystrophy Brother      Social History     Socioeconomic History    Marital status:      Spouse name: None    Number of children: None    Years of education: None    Highest education level: None   Occupational History    Occupation: retired   Tobacco Use    Smoking status: Former     Current packs/day: 0.00     Average packs/day: 1 pack/day for 50.0 years (50.0 ttl pk-yrs)     Types: Cigarettes     Start date:      Quit date: 2000     Years since quittin.4     Passive exposure: Past    Smokeless tobacco: Never    Tobacco comments:     Quit in    Vaping Use    Vaping status: Never Used   Substance and Sexual Activity    Alcohol use: No    Drug use: No    Sexual activity: Not Currently   Other Topics Concern    None   Social History Narrative    ** Merged History Encounter **         Family dynamics: Supportive  Relationship status:    Children and Ages:1 adult dtr Liz, 1 adult son Rito  Other important family information: Caregiver  for her brother Patel, who is disabled  Living situation: Lives with brother        Employm    ent history/source of income: Worked as a  for [employer] prior to jail   Spirituality/ Yazdanism: Druze    Patient's strengths, social supports, and resources: Supportive family  Hobbies/Interests: Sewing; Crafting  [employer]    e: States dtr Liz is her POA       Social Determinants of Health     Financial Resource Strain: Low Risk  (5/16/2023)    Overall Financial Resource Strain (CARDIA)     Difficulty of Paying Living Expenses: Not hard at all   Food Insecurity: No Food Insecurity (5/30/2024)    Hunger Vital Sign     Worried About Running Out of Food in the Last Year: Never true     Ran Out of Food in the Last Year: Never true   Transportation Needs: No Transportation Needs (5/30/2024)    PRAPARE - Transportation     Lack of Transportation (Medical): No     Lack of Transportation (Non-Medical): No   Physical Activity: Not on file   Stress: Not on file   Social Connections: Not on file   Intimate Partner Violence: Not on file   Housing Stability: Low Risk  (5/30/2024)    Housing Stability Vital Sign     Unable to Pay for Housing in the Last Year: No     Number of Times Moved in the Last Year: 0     Homeless in the Last Year: No         Medications:  All current active meds have been reviewed and current meds:  Scheduled Meds:  Current Facility-Administered Medications   Medication Dose Route Frequency Provider Last Rate    acetaminophen  975 mg Oral Q8H PRN Rito Sheridan, DO      apixaban  5 mg Oral BID Rito Sheridan, DO      aspirin  81 mg Oral Daily Rito Sheridan, DO      atorvastatin  40 mg Oral Daily Rito Sheridan, DO      busPIRone  7.5 mg Oral HS Rito Sheridan, DO      cyanocobalamin  100 mcg Oral Daily Rito Sheridan, DO      folic acid  1 mg Oral Daily Rito Sheridan, DO      gabapentin  200 mg Oral BID Rito Sheridan, DO      insulin lispro  1-5 Units Subcutaneous TID AC Rito Sheridan, DO      insulin lispro   1-5 Units Subcutaneous HS Rito Sheridan, DO      melatonin  3 mg Oral HS Marta Lazo MD      metoprolol tartrate  50 mg Oral Q12H Emmanuelle Velasquez MD      oxyCODONE  2.5 mg Oral Q4H PRN Kyle Brunner, MD      Or    oxyCODONE  5 mg Oral Q4H PRN Kyle Brunner, MD      pantoprazole  40 mg Oral Early Morning Rito Sheridan, DO      polyethylene glycol  17 g Oral Daily Rito Sheridan, DO      prochlorperazine  10 mg Oral TID PRN Rito Fatimah, DO      senna  1 tablet Oral Daily Rito Sheridan, DO      sodium bicarbonate  650 mg Oral BID after meals Shaun Banegas MD      sotalol  80 mg Oral Q12H STEPHIE Emmanuelle Velasquez MD      Sotorasib  960 mg Oral Daily Rito Sheridan, DO      torsemide  20 mg Oral BID Shaun Banegas MD      venlafaxine  100 mg Oral HS Marta Lazo MD       Continuous Infusions:   PRN Meds:.  acetaminophen    oxyCODONE **OR** oxyCODONE    prochlorperazine       ROS:   A 12 system ROS was completed. Other than the above mentioned complaints in the HPI and those commented on below, all remaining systems were negative.      Vitals:   /77   Pulse 74   Temp (!) 97 °F (36.1 °C)   Resp 16   Wt 68.4 kg (150 lb 12.7 oz)   LMP  (LMP Unknown)   SpO2 98%   BMI 27.58 kg/m²       Physical and neurologic exam performed by attending neurologist:   Physical Exam  Vitals and nursing note reviewed.   Constitutional:       General: She is not in acute distress.     Appearance: She is not ill-appearing or diaphoretic.   HENT:      Head: Normocephalic.      Mouth/Throat:      Mouth: Mucous membranes are moist.      Pharynx: Oropharynx is clear.   Eyes:      General: No scleral icterus.        Right eye: No discharge.         Left eye: No discharge.      Extraocular Movements: Extraocular movements intact and EOM normal.      Conjunctiva/sclera: Conjunctivae normal.   Cardiovascular:      Rate and Rhythm: Normal rate.   Pulmonary:      Effort: Pulmonary effort is normal. No respiratory  distress.   Musculoskeletal:      Right lower leg: No edema.      Left lower leg: No edema.   Skin:     General: Skin is warm and dry.      Coloration: Skin is not jaundiced or pale.   Neurological:      Mental Status: She is alert and oriented to person, place, and time.      Coordination: Finger-Nose-Finger Test normal.   Psychiatric:         Mood and Affect: Mood normal.         Behavior: Behavior normal.       Neurologic Exam     Mental Status   Oriented to person, place, and time.   Level of consciousness: alert  Able to follow commands appropriately. No aphasia or dysarthria noted.     Cranial Nerves     CN II   Right visual field deficit: none  Left visual field deficit: none     CN III, IV, VI   Extraocular motions are normal.     CN VII   Facial expression full, symmetric.     CN VIII   Hearing: intact    CN IX, X   Palate: symmetric    CN XII   CN XII normal.     Motor Exam   Muscle bulk: normal  Right arm pronator drift: absent  Left arm pronator drift: absent  Full strength throughout bilateral UE  R hip extension, knee flexion, knee extension, plantar flexion, dorsiflexion 5/5  R hip flexion 3/5  L hip extension, knee extension, dorsiflexion, plantar flexion 5/5  L hip flexion 2/5 (pain limited)  Unable to perform L flexion due to pain     Sensory Exam   Light touch normal.     Gait, Coordination, and Reflexes     Coordination   Finger to nose coordination: normal    Tremor   Resting tremor: absent  Intention tremor: absent    Reflexes   Right plantar: normal  Left plantar: upgoing  Right ankle clonus: absent  Left ankle clonus: absent  Bilateral UE asterixis       Labs: I have personally reviewed pertinent reports.   Recent Results (from the past 24 hour(s))   Fingerstick Glucose (POCT)    Collection Time: 06/03/24  4:13 PM   Result Value Ref Range    POC Glucose 170 (H) 65 - 140 mg/dl   Fingerstick Glucose (POCT)    Collection Time: 06/03/24  9:18 PM   Result Value Ref Range    POC Glucose 177 (H) 65  - 140 mg/dl   CBC and Platelet    Collection Time: 06/04/24  5:30 AM   Result Value Ref Range    WBC 12.45 (H) 4.31 - 10.16 Thousand/uL    RBC 4.11 3.81 - 5.12 Million/uL    Hemoglobin 9.1 (L) 11.5 - 15.4 g/dL    Hematocrit 31.9 (L) 34.8 - 46.1 %    MCV 78 (L) 82 - 98 fL    MCH 22.1 (L) 26.8 - 34.3 pg    MCHC 28.5 (L) 31.4 - 37.4 g/dL    RDW 19.7 (H) 11.6 - 15.1 %    Platelets 371 149 - 390 Thousands/uL    MPV 10.6 8.9 - 12.7 fL   Basic metabolic panel    Collection Time: 06/04/24  5:30 AM   Result Value Ref Range    Sodium 134 (L) 135 - 147 mmol/L    Potassium 3.8 3.5 - 5.3 mmol/L    Chloride 95 (L) 96 - 108 mmol/L    CO2 30 21 - 32 mmol/L    ANION GAP 9 4 - 13 mmol/L    BUN 25 5 - 25 mg/dL    Creatinine 1.23 0.60 - 1.30 mg/dL    Glucose 164 (H) 65 - 140 mg/dL    Calcium 9.0 8.4 - 10.2 mg/dL    eGFR 43 ml/min/1.73sq m   Lipid Panel with Direct LDL reflex    Collection Time: 06/04/24  5:30 AM   Result Value Ref Range    Cholesterol 82 See Comment mg/dL    Triglycerides 84 See Comment mg/dL    HDL, Direct 27 (L) >=50 mg/dL    LDL Calculated 38 0 - 100 mg/dL   Fingerstick Glucose (POCT)    Collection Time: 06/04/24  8:39 AM   Result Value Ref Range    POC Glucose 201 (H) 65 - 140 mg/dl       Imaging: I have personally reviewed pertinent imaging in PACS, and I have personally reviewed PACS reports.     EKG, Pathology, and Other Studies: I have personally reviewed pertinent reports.       VTE Prophylaxis: Sequential compression device (Venodyne) and Eliquis

## 2024-06-04 NOTE — ASSESSMENT & PLAN NOTE
Lab Results   Component Value Date    HGBA1C 7.2 (H) 05/08/2024       Recent Labs     06/03/24  0712 06/03/24  1053 06/03/24  1613 06/03/24  2118   POCGLU 216* 203* 170* 177*       Blood Sugar Average: Last 72 hrs:  (P) 185.5    Hold home medications. SSI algorithm 1, titrate as needed.

## 2024-06-05 ENCOUNTER — HOSPITAL ENCOUNTER (OUTPATIENT)
Dept: RADIOLOGY | Age: 74
Discharge: HOME | End: 2024-06-05

## 2024-06-05 ENCOUNTER — APPOINTMENT (INPATIENT)
Dept: MRI IMAGING | Facility: HOSPITAL | Age: 74
DRG: 682 | End: 2024-06-05
Payer: MEDICARE

## 2024-06-05 ENCOUNTER — APPOINTMENT (INPATIENT)
Dept: ULTRASOUND IMAGING | Facility: HOSPITAL | Age: 74
DRG: 682 | End: 2024-06-05
Payer: MEDICARE

## 2024-06-05 LAB
ALBUMIN SERPL BCP-MCNC: 2.9 G/DL (ref 3.5–5)
ALP SERPL-CCNC: 260 U/L (ref 34–104)
ALT SERPL W P-5'-P-CCNC: 18 U/L (ref 7–52)
ANION GAP SERPL CALCULATED.3IONS-SCNC: 10 MMOL/L (ref 4–13)
AST SERPL W P-5'-P-CCNC: 16 U/L (ref 13–39)
BILIRUB DIRECT SERPL-MCNC: 0.14 MG/DL (ref 0–0.2)
BILIRUB SERPL-MCNC: 0.5 MG/DL (ref 0.2–1)
BUN SERPL-MCNC: 31 MG/DL (ref 5–25)
CALCIUM SERPL-MCNC: 8.7 MG/DL (ref 8.4–10.2)
CHLORIDE SERPL-SCNC: 92 MMOL/L (ref 96–108)
CO2 SERPL-SCNC: 30 MMOL/L (ref 21–32)
CREAT SERPL-MCNC: 1.46 MG/DL (ref 0.6–1.3)
GFR SERPL CREATININE-BSD FRML MDRD: 34 ML/MIN/1.73SQ M
GLUCOSE SERPL-MCNC: 150 MG/DL (ref 65–140)
GLUCOSE SERPL-MCNC: 160 MG/DL (ref 65–140)
GLUCOSE SERPL-MCNC: 160 MG/DL (ref 65–140)
GLUCOSE SERPL-MCNC: 162 MG/DL (ref 65–140)
GLUCOSE SERPL-MCNC: 194 MG/DL (ref 65–140)
POTASSIUM SERPL-SCNC: 3.5 MMOL/L (ref 3.5–5.3)
PROT SERPL-MCNC: 6.1 G/DL (ref 6.4–8.4)
SODIUM SERPL-SCNC: 132 MMOL/L (ref 135–147)

## 2024-06-05 PROCEDURE — 76775 US EXAM ABDO BACK WALL LIM: CPT

## 2024-06-05 PROCEDURE — 97535 SELF CARE MNGMENT TRAINING: CPT

## 2024-06-05 PROCEDURE — 80076 HEPATIC FUNCTION PANEL: CPT

## 2024-06-05 PROCEDURE — 99233 SBSQ HOSP IP/OBS HIGH 50: CPT | Performed by: INTERNAL MEDICINE

## 2024-06-05 PROCEDURE — 80048 BASIC METABOLIC PNL TOTAL CA: CPT | Performed by: INTERNAL MEDICINE

## 2024-06-05 PROCEDURE — 82948 REAGENT STRIP/BLOOD GLUCOSE: CPT

## 2024-06-05 PROCEDURE — 99232 SBSQ HOSP IP/OBS MODERATE 35: CPT | Performed by: INTERNAL MEDICINE

## 2024-06-05 RX ORDER — LORAZEPAM 2 MG/ML
0.5 INJECTION INTRAMUSCULAR
Status: COMPLETED | OUTPATIENT
Start: 2024-06-05 | End: 2024-06-05

## 2024-06-05 RX ADMIN — LORAZEPAM 0.5 MG: 2 INJECTION INTRAMUSCULAR; INTRAVENOUS at 13:10

## 2024-06-05 RX ADMIN — LORAZEPAM 0.5 MG: 2 INJECTION INTRAMUSCULAR; INTRAVENOUS at 12:12

## 2024-06-05 RX ADMIN — SENNOSIDES 17.2 MG: 8.6 TABLET, FILM COATED ORAL at 10:08

## 2024-06-05 RX ADMIN — Medication 3 MG: at 21:48

## 2024-06-05 RX ADMIN — FOLIC ACID 1 MG: 1 TABLET ORAL at 10:07

## 2024-06-05 RX ADMIN — INSULIN LISPRO 1 UNITS: 100 INJECTION, SOLUTION INTRAVENOUS; SUBCUTANEOUS at 21:49

## 2024-06-05 RX ADMIN — ATORVASTATIN CALCIUM 40 MG: 40 TABLET, FILM COATED ORAL at 10:08

## 2024-06-05 RX ADMIN — BUSPIRONE HYDROCHLORIDE 7.5 MG: 5 TABLET ORAL at 21:48

## 2024-06-05 RX ADMIN — INSULIN LISPRO 1 UNITS: 100 INJECTION, SOLUTION INTRAVENOUS; SUBCUTANEOUS at 12:14

## 2024-06-05 RX ADMIN — PANTOPRAZOLE SODIUM 40 MG: 40 TABLET, DELAYED RELEASE ORAL at 05:10

## 2024-06-05 RX ADMIN — ASPIRIN 81 MG: 81 TABLET, COATED ORAL at 10:08

## 2024-06-05 RX ADMIN — APIXABAN 5 MG: 5 TABLET, FILM COATED ORAL at 10:08

## 2024-06-05 RX ADMIN — GABAPENTIN 200 MG: 100 CAPSULE ORAL at 10:08

## 2024-06-05 RX ADMIN — VITAM B12 100 MCG: 100 TAB at 10:07

## 2024-06-05 RX ADMIN — SOTALOL HYDROCHLORIDE 80 MG: 80 TABLET ORAL at 21:48

## 2024-06-05 RX ADMIN — APIXABAN 5 MG: 5 TABLET, FILM COATED ORAL at 20:06

## 2024-06-05 RX ADMIN — VENLAFAXINE 100 MG: 25 TABLET ORAL at 21:49

## 2024-06-05 RX ADMIN — SODIUM CHLORIDE 500 ML: 0.9 INJECTION, SOLUTION INTRAVENOUS at 10:11

## 2024-06-05 RX ADMIN — METOPROLOL TARTRATE 50 MG: 50 TABLET, FILM COATED ORAL at 07:49

## 2024-06-05 RX ADMIN — METOPROLOL TARTRATE 50 MG: 50 TABLET, FILM COATED ORAL at 20:06

## 2024-06-05 RX ADMIN — INSULIN LISPRO 1 UNITS: 100 INJECTION, SOLUTION INTRAVENOUS; SUBCUTANEOUS at 07:45

## 2024-06-05 NOTE — CASE MANAGEMENT
Case Management Progress Note    Patient name Melany Griffith  Location S /S -01 MRN [id number]  : 1950 Date 2024       LOS (days): 7  Geometric Mean LOS (GMLOS) (days): 4.4  Days to GMLOS:-2.6        OBJECTIVE:        Current admission status: Inpatient  Preferred Pharmacy:   [id number] - [place] - [address]  [address] [facility] [place] [address]  Phone: [phone] Fax: [phone]    [facility] - [place], [place] - [address] [place] [id number]  Phone: [phone] Fax: [phone]    Primary Care Provider: Salina Dwyer DO    Primary Insurance: MEDICARE  Secondary Insurance: AARP    PROGRESS NOTE:    Per SLIM - patient likely d/c 24-48hrs due to creat increase and nephro following. CM provided d/c update to [facility] via aidin.

## 2024-06-05 NOTE — PROGRESS NOTES
[facility]  Progress Note  Name: Melany Griffith I  MRN: [id number]  Unit/Bed#: S -01 I Date of Admission: 5/28/2024   Date of Service: 6/5/2024 I Hospital Day: 7    Assessment & Plan   Ambulatory dysfunction  Assessment & Plan  - Continue fall precautions due to increased risk from sedation  - PT/OT evaluation recommending inpatient rehab - patient to be transferred to [facility] at discharge.   - Hold remeron as mentioned previously  - Reassess/monitor for any changes daily  - Continue inpatient OT/PT  - Follow up with Neurology.     * ADITYA on CKD  Assessment & Plan  Lab Results   Component Value Date    EGFR 34 06/05/2024    EGFR 43 06/04/2024    EGFR 46 06/03/2024    CREATININE 1.46 (H) 06/05/2024    CREATININE 1.23 06/04/2024    CREATININE 1.16 06/03/2024     POA Cr 1.57   Baseline Cr 0.9-1.1  Elevated on today's lab after her Cr was slowly reducing over the past few days   Patient did not receive IV contrast during yesterday's attempt to obtain MRI   Nephrology following - recommended to hold diuretics (including home Valsartan)  Provided IVF bolus  Fluid restriction 1.5 L / day  Avoid hypotension and continue to monitor BP closely  Avoid Nephrotoxins and adjust renally excreted medications  Continue holding Valsartan - likely will hold after discharge and will notify PCP about discontinuation and adjustment of her current BP medication regimen  STOP Torsemide   Follow up AM BMP  Continue bladder scan/urinary retention protocol  Continue following Nephrology (appreciate for the recommendations).     Adenocarcinoma of lung, stage 4 (HCC)  Assessment & Plan  - S/p chemo, radiation, and immunotherapy. Follows w/ Dr. Sam outpatient. Currently on Sotorasib only.   - Patient's Sotorasib is running low and will need refill   - Discuss with Pharmacy for options during her inpatient stay.   - Continue monitoring for any changes  - Follow up with Hem/Onc/Dr. Sam as the patient mentioned he  was going to see the patient this PM.   - Continue current regimen and adjust accordingly.     Paroxysmal atrial fibrillation (HCC)  Assessment & Plan  Continue home eliquis, sotalol. Metoprolol tartrate increased to 50 BID for better HR contol.     Lower extremity edema  Assessment & Plan  - Improving since admission   - No evidence of acute CHF exacerbation at this time   - Holding Torsemide - per Nephrology since her Cr elevated overnight.   - Continue Compression stalking and other measures to avoid re-exacerbating her chronic condition  - Reassess and monitor closely.     Chronic diastolic heart failure (HCC)  Assessment & Plan  Wt Readings from Last 3 Encounters:   06/05/24 66.4 kg (146 lb 6.2 oz)   05/21/24 69.3 kg (152 lb 12.8 oz)   05/20/24 71.4 kg (157 lb 8 oz)     Last Echo in 2022 showed EF 60, grade 1 diastolic dysfunction; home Lasix recently increased to 40 mg daily prn for swelling likely causing ADITYA.    Holding torsemide due to abrupt elevation in Cr overnight; lower Extremity Edema appears to be improving since admission; Nephrology following; patient denied any SOB/coughs, worsening b/l LE edema.   Continue monitoring for fluid overload and continue fluid restriction as mentioned above.   Follow up AM labs  Continue following Nephrology   Monitor Urine Output; I/O's  Monitor vitals  Reassess in AM    Hyponatremia  Assessment & Plan  Recent Labs     06/03/24  0536 06/04/24  0530 06/05/24  0503   SODIUM 133* 134* 132*         Likely 2/2 SIADH in the setting of Adenocarcinoma of the Lung - stage 4  Provided bolus IVF today as her Cr worsened after diuresis with Torsemide.   Encouraged PO intake - but patient is also fluid restricted due to her CHF.   Will monitor her closely  Denied any new symptom development since yesterday.   Follow up AM labs  Continue follow up with Nephrology.   Adjust regimen accordingly    Fall  Assessment & Plan  S/p mechanical fall with no LOC. On eliquis. Sustained  hit to back of the head and R shoulder. Trauma workup negative. Not currently in any pain.   Reported left hip and left knee pain; X-rays were ordered  Due to worsening neurologic function including imbalance and poor coordination of left side extremities CT head was repeated on 6/3 which was unremarkable and neurology was consulted    Plan  Tylenol prn for pain control  PT/OT recommending PAR, which patient agrees is a good idea  Fall precautions  Neurology consulted, recs appreciated:  MRI brain without contrast pending    Acute pain of right shoulder  Assessment & Plan  S/p fall. Trauma XR's all negative. Tylenol 975 prn.     Type 2 diabetes mellitus with stage 3 chronic kidney disease, without long-term current use of insulin, unspecified whether stage 3a or 3b CKD (HCC)  Assessment & Plan  Lab Results   Component Value Date    HGBA1C 7.2 (H) 05/08/2024       Recent Labs     06/05/24  0727 06/05/24  1132 06/05/24  1530 06/05/24 2031   POCGLU 160* 194* 160* 150*         Blood Sugar Average: Last 72 hrs:  (P) 186.6875    Hold home medications. SSI algorithm 1, titrate as needed.     Constipation  Assessment & Plan  She notes ongoing issues with constipation at home. Miralax and Senna both ordered daily. Increase/reduce as needed.     Sacral wound  Assessment & Plan  Small wound present in the R sacral/buttock area. Small amount of scabbing present, no current discharge. Low suspicion of infection.     Plan  Wound care consult  No need for abx at this time    Severe protein-calorie malnutrition (HCC)  Assessment & Plan  Malnutrition Findings:   Adult Malnutrition type: Chronic illness  Adult Degree of Malnutrition: Other severe protein calorie malnutrition  Malnutrition Characteristics: Weight loss, Inadequate energy, Muscle loss                  360 Statement: Chronic/severe malnutrition r/t decreased PO intake/condition, as evidenced by 9.5% wt loss x 3 months (5/21/24: 152#, 2/27/24: 168#),  intake meeting <75%  of estimated needs for > 1 month, and severe muscle mass depletion (temporal region). Treatment: liberal PO diet + nutrition supplements    BMI Findings:           Body mass index is 26.77 kg/m².   Remeron 7.5 tonight for appetite, sleep and mood  D/c remeron given sedation. Consider restarting outpatient once renal function stable    Sepsis (HCC)  Assessment & Plan  Met SIRS with HR 90s and WBC >12. Suspect urinary source given UA on admission. Patient unreliable and unable to give symptomatic history.   Treated UTI with ceftriaxone; 3 day course completed on    S/p IV hydration, blood pressures supported without further fluids  Trend WBC daily and vitals per unit routine             VTE Pharmacologic Prophylaxis:   VTE Score: 7 High Risk (Score >/= 5) - Pharmacological DVT Prophylaxis Ordered: Apixaban (Eliquis). Sequential Compression Devices Ordered.    Mechanical VTE Prophylaxis in Place: Yes    Patient Centered Rounds: I have performed bedside rounds with nursing staff today.    Discussions with Specialists or Other Care Team Provider: Nephrology, Neurology,     Education and Discussions with Family / Patient: Attempted to update  (daughter) via phone. Unable to contact.    Current Length of Stay: 7 day(s)    Current Patient Status: Inpatient     Discharge Plan / Estimated Discharge Date: Anticipate discharge tomorrow to [facility]    Code Status: Level 3 - DNAR and DNI      Subjective:     Overnight event(s): NONE per night nurse and day nurse.     Patient was seen this morning - sitting upright, watching tv in room by herself.  Discussion was held pertaining to her inability to relax/stay-still during MRI.  Patient reported that she will attempt again today to obtain MRI of brain and C-spine with anxiolytic.  Denied any new symptoms/problems today.       Objective:     Vitals:   Temp (24hrs), Av.4 °F (36.3 °C), Min:97.2 °F (36.2 °C), Max:97.5 °F (36.4 °C)    Temp:  [97.2 °F (36.2 °C)-97.5 °F  (36.4 °C)] 97.2 °F (36.2 °C)  HR:  [65-76] 76  Resp:  [18] 18  BP: ()/(62-89) 111/66  SpO2:  [91 %-98 %] 98 %  Body mass index is 26.77 kg/m².     Input and Output Summary (last 24 hours):       Intake/Output Summary (Last 24 hours) at 6/5/2024 2125  Last data filed at 6/5/2024 1754  Gross per 24 hour   Intake 360 ml   Output --   Net 360 ml       Physical Exam:     Physical Exam  Vitals and nursing note reviewed.   Constitutional:       General: She is not in acute distress.     Appearance: She is well-developed.   HENT:      Head: Normocephalic and atraumatic.   Eyes:      Conjunctiva/sclera: Conjunctivae normal.   Cardiovascular:      Rate and Rhythm: Normal rate and regular rhythm.      Pulses: Normal pulses.      Heart sounds: Normal heart sounds. No murmur heard.  Pulmonary:      Effort: Pulmonary effort is normal. No respiratory distress.      Breath sounds: Normal breath sounds.   Abdominal:      Palpations: Abdomen is soft.      Tenderness: There is no abdominal tenderness.   Musculoskeletal:         General: No swelling.      Cervical back: Neck supple.      Right lower leg: No edema.      Left lower leg: No edema.   Skin:     General: Skin is warm and dry.      Capillary Refill: Capillary refill takes less than 2 seconds.   Neurological:      Mental Status: She is alert.   Psychiatric:         Mood and Affect: Mood normal.          Additional Data:     Labs:  Results from last 7 days   Lab Units 06/04/24  0530 06/03/24  0536 06/01/24  0643 05/31/24  0429   WBC Thousand/uL 12.45* 14.97*   < > 14.51*   HEMOGLOBIN g/dL 9.1* 9.8*   < > 8.8*   HEMATOCRIT % 31.9* 33.4*   < > 30.7*   PLATELETS Thousands/uL 371 371   < > 376   BANDS PCT %  --   --   --  1   SEGS PCT %  --  89*   < >  --    LYMPHO PCT %  --  5*   < > 6*   MONO PCT %  --  5   < > 6   EOS PCT %  --  0   < > 0    < > = values in this interval not displayed.     Results from last 7 days   Lab Units 06/05/24  0940 06/05/24  0503   SODIUM mmol/L   --  132*   POTASSIUM mmol/L  --  3.5   CHLORIDE mmol/L  --  92*   CO2 mmol/L  --  30   BUN mg/dL  --  31*   CREATININE mg/dL  --  1.46*   ANION GAP mmol/L  --  10   CALCIUM mg/dL  --  8.7   ALBUMIN g/dL 2.9*  --    TOTAL BILIRUBIN mg/dL 0.50  --    ALK PHOS U/L 260*  --    ALT U/L 18  --    AST U/L 16  --    GLUCOSE RANDOM mg/dL  --  162*         Results from last 7 days   Lab Units 06/05/24  2031 06/05/24  1530 06/05/24  1132 06/05/24  0727 06/04/24  2118 06/04/24  1718 06/04/24  1103 06/04/24  0839 06/03/24  2118 06/03/24  1613 06/03/24  1053 06/03/24  0712   POC GLUCOSE mg/dl 150* 160* 194* 160* 179* 234* 173* 201* 177* 170* 203* 216*               Imaging: Reviewed radiology reports from this admission including: chest xray, CT head, and MRI brain    Recent Cultures (last 7 days):           Lines/Drains:  Invasive Devices       Central Venous Catheter Line  Duration             Port A Cath 12/14/21 Right Chest 904 days              Peripheral Intravenous Line  Duration             Peripheral IV 06/02/24 Right Antecubital 3 days                    Telemetry:        Last 24 Hours Medication List:   Current Facility-Administered Medications   Medication Dose Route Frequency Provider Last Rate    acetaminophen  975 mg Oral Q8H PRN Rito Sheridan, DO      apixaban  5 mg Oral BID Rito Sheridan, DO      aspirin  81 mg Oral Daily Rito Sheridan, DO      atorvastatin  40 mg Oral Daily Rito Sheridan, DO      busPIRone  7.5 mg Oral HS Rito Sheridan, DO      cyanocobalamin  100 mcg Oral Daily Rito Sheridan, DO      folic acid  1 mg Oral Daily Rito Sheridan, DO      gabapentin  200 mg Oral BID Rito Sheridan, DO      insulin lispro  1-5 Units Subcutaneous TID AC Rito Sheridan, DO      insulin lispro  1-5 Units Subcutaneous HS Rito Sheridan, DO      melatonin  3 mg Oral HS Marta Lazo MD      metoprolol tartrate  50 mg Oral Q12H Emmanuelle Velasquez MD      oxyCODONE  2.5 mg Oral Q4H PRN Kyle Brunner, MD      Or    oxyCODONE  5 mg Oral Q4H  PRN Kyle Brunner, MD      pantoprazole  40 mg Oral Early Morning Rito Sheridan,       polyethylene glycol  17 g Oral Daily Rito Sheridan,       prochlorperazine  10 mg Oral TID PRN Rito Sheridan,       senna  2 tablet Oral Daily Shawn Ferrer, DO      sotalol  80 mg Oral Q12H STEPHIE Emmanuelle Velasquez MD      Sotorasib  960 mg Oral Daily Rito Sheridan,       venlafaxine  100 mg Oral HS Marta Lazo MD          Today, Patient Was Seen By: Edenilson Scherer DO    ** Please Note: This note has been constructed using a voice recognition system. **

## 2024-06-05 NOTE — PLAN OF CARE
Problem: Potential for Falls  Goal: Patient will remain free of falls  Description: INTERVENTIONS:  - Educate patient/family on patient safety including physical limitations  - Instruct patient to call for assistance with activity   - Consult OT/PT to assist with strengthening/mobility   - Keep Call bell within reach  - Keep bed low and locked with side rails adjusted as appropriate  - Keep care items and personal belongings within reach  - Initiate and maintain comfort rounds  - Make Fall Risk Sign visible to staff  - Apply yellow socks and bracelet for high fall risk patients  - Consider moving patient to room near nurses station  Outcome: Progressing     Problem: Nutrition/Hydration-ADULT  Goal: Nutrient/Hydration intake appropriate for improving, restoring or maintaining nutritional needs  Description: Monitor and assess patient's nutrition/hydration status for malnutrition. Collaborate with interdisciplinary team and initiate plan and interventions as ordered.  Monitor patient's weight and dietary intake as ordered or per policy. Utilize nutrition screening tool and intervene as necessary. Determine patient's food preferences and provide high-protein, high-caloric foods as appropriate.     INTERVENTIONS:  - Monitor oral intake, urinary output, labs, and treatment plans  - Assess nutrition and hydration status and recommend course of action  - Evaluate amount of meals eaten  - Assist patient with eating if necessary   - Allow adequate time for meals  - Recommend/ encourage appropriate diets, oral nutritional supplements, and vitamin/mineral supplements  - Order, calculate, and assess calorie counts as needed  - Recommend, monitor, and adjust tube feedings and TPN/PPN based on assessed needs  - Assess need for intravenous fluids  - Provide specific nutrition/hydration education as appropriate  - Include patient/family/caregiver in decisions related to nutrition  Outcome: Progressing     Problem: Prexisting or  High Potential for Compromised Skin Integrity  Goal: Skin integrity is maintained or improved  Description: INTERVENTIONS:  - Identify patients at risk for skin breakdown  - Assess and monitor skin integrity  - Assess and monitor nutrition and hydration status  - Monitor labs   - Assess for incontinence   - Turn and reposition patient  - Assist with mobility/ambulation  - Relieve pressure over bony prominences  - Avoid friction and shearing  - Provide appropriate hygiene as needed including keeping skin clean and dry  - Evaluate need for skin moisturizer/barrier cream  - Collaborate with interdisciplinary team   - Patient/family teaching  - Consider wound care consult   Outcome: Progressing     Problem: PAIN - ADULT  Goal: Verbalizes/displays adequate comfort level or baseline comfort level  Description: Interventions:  - Encourage patient to monitor pain and request assistance  - Assess pain using appropriate pain scale  - Administer analgesics based on type and severity of pain and evaluate response  - Implement non-pharmacological measures as appropriate and evaluate response  - Consider cultural and social influences on pain and pain management  - Notify physician/advanced practitioner if interventions unsuccessful or patient reports new pain  Outcome: Progressing     Problem: INFECTION - ADULT  Goal: Absence or prevention of progression during hospitalization  Description: INTERVENTIONS:  - Assess and monitor for signs and symptoms of infection  - Monitor lab/diagnostic results  - Monitor all insertion sites, i.e. indwelling lines, tubes, and drains  - Monitor endotracheal if appropriate and nasal secretions for changes in amount and color  - [place] appropriate cooling/warming therapies per order  - Administer medications as ordered  - Instruct and encourage patient and family to use good hand hygiene technique  - Identify and instruct in appropriate isolation precautions for identified  infection/condition  Outcome: Progressing  Goal: Absence of fever/infection during neutropenic period  Description: INTERVENTIONS:  - Monitor WBC    Outcome: Progressing     Problem: SAFETY ADULT  Goal: Patient will remain free of falls  Description: INTERVENTIONS:  - Educate patient/family on patient safety including physical limitations  - Instruct patient to call for assistance with activity   - Consult OT/PT to assist with strengthening/mobility   - Keep Call bell within reach  - Keep bed low and locked with side rails adjusted as appropriate  - Keep care items and personal belongings within reach  - Initiate and maintain comfort rounds  - Make Fall Risk Sign visible to staff  - Apply yellow socks and bracelet for high fall risk patients  - Consider moving patient to room near nurses station  Outcome: Progressing  Goal: Maintain or return to baseline ADL function  Description: INTERVENTIONS:  -  Assess patient's ability to carry out ADLs; assess patient's baseline for ADL function and identify physical deficits which impact ability to perform ADLs (bathing, care of mouth/teeth, toileting, grooming, dressing, etc.)  - Assess/evaluate cause of self-care deficits   - Assess range of motion  - Assess patient's mobility; develop plan if impaired  - Assess patient's need for assistive devices and provide as appropriate  - Encourage maximum independence but intervene and supervise when necessary  - Involve family in performance of ADLs  - Assess for home care needs following discharge   - Consider OT consult to assist with ADL evaluation and planning for discharge  - Provide patient education as appropriate  Outcome: Progressing  Goal: Maintains/Returns to pre admission functional level  Description: INTERVENTIONS:  - Perform AM-PAC 6 Click Basic Mobility/ Daily Activity assessment daily.  - Set and communicate daily mobility goal to care team and patient/family/caregiver.   - Collaborate with rehabilitation services  on mobility goals if consulted  - Out of bed for toileting  - Record patient progress and toleration of activity level   Outcome: Progressing     Problem: DISCHARGE PLANNING  Goal: Discharge to home or other facility with appropriate resources  Description: INTERVENTIONS:  - Identify barriers to discharge w/patient and caregiver  - Arrange for needed discharge resources and transportation as appropriate  - Identify discharge learning needs (meds, wound care, etc.)  - Arrange for interpretive services to assist at discharge as needed  - Refer to Case Management Department for coordinating discharge planning if the patient needs post-hospital services based on physician/advanced practitioner order or complex needs related to functional status, cognitive ability, or social support system  Outcome: Progressing     Problem: Knowledge Deficit  Goal: Patient/family/caregiver demonstrates understanding of disease process, treatment plan, medications, and discharge instructions  Description: Complete learning assessment and assess knowledge base.  Interventions:  - Provide teaching at level of understanding  - Provide teaching via preferred learning methods  Outcome: Progressing

## 2024-06-05 NOTE — OCCUPATIONAL THERAPY NOTE
"  Occupational Therapy Progress Note     Patient Name: Melany Griffith  Today's Date: 6/5/2024  Problem List  Principal Problem:    ADITYA on CKD  Active Problems:    Paroxysmal atrial fibrillation (HCC)    Adenocarcinoma of lung, stage 4 (HCC)    Hyponatremia    Chronic diastolic heart failure (HCC)    Type 2 diabetes mellitus with stage 3 chronic kidney disease, without long-term current use of insulin, unspecified whether stage 3a or 3b CKD (HCC)    Lower extremity edema    Constipation    Fall    Ambulatory dysfunction    Acute pain of right shoulder    Sacral wound    Severe protein-calorie malnutrition (HCC)    Sepsis (HCC)              06/05/24 0901   OT Last Visit   OT Visit Date 06/05/24   Note Type   Note Type Treatment   Pain Assessment   Pain Assessment Tool 0-10   Pain Score 4   Pain Location/Orientation Orientation: Left;Location: Groin   Hospital Pain Intervention(s) Repositioned;Ambulation/increased activity;Emotional support   Restrictions/Precautions   Weight Bearing Precautions Per Order No   Other Precautions Cognitive;Chair Alarm;Bed Alarm;Fall Risk   Lifestyle   Autonomy At baseline, pt is (I) c ADLs, needs A with IADLs. no AD vs occasional RW vs SPC use. lives c handicapped brother. + driving + falls   Reciprocal Relationships supportive cousin. brother   Service to Others retired   Intrinsic Gratification used to enjoy crafts, but reports \"they are too difficult to clean up now\". Reports that she made masks during COVID, as well   ADL   Grooming Assistance 5  Supervision/Setup   Grooming Deficit Increased time to complete;Supervision/safety;Verbal cueing   Grooming Comments sitting EOB to wash face   UB Bathing Assistance 4  Minimal Assistance   UB Bathing Deficit Increased time to complete;Supervision/safety;Verbal cueing   UB Bathing Comments sitting EOB to complete   LB Bathing Assistance 3  Moderate Assistance   LB Bathing Deficit Supervision/safety;Increased time to complete;Verbal " "cueing;Right lower leg including foot;Left lower leg including foot;Buttocks   LB Bathing Comments Pt able to wash carolyn-area + B thighs in sitting. A with lower legs in sitting and A with buttocks in standing   UB Dressing Assistance 4  Minimal Assistance   UB Dressing Deficit Increased time to complete;Supervision/safety;Verbal cueing;Thread RUE;Thread LUE;Pull around back   UB Dressing Comments doff/donning gown   LB Dressing Assistance 1  Total Assistance   LB Dressing Deficit Don/doff R sock;Don/doff L sock   LB Dressing Comments Pt reports increased pain in LLE : limiting ability to bend forward for socks   Toileting Assistance  2  Maximal Assistance   Toileting Comments Pt GROSSLY incontinent of urine upon arrival, requiring total A for hygiene   Functional Standing Tolerance   Time 1 min   Activity bathing/toileting hygiene in standing   Comments requriing BUE support on RW and cuing for upright posture   Bed Mobility   Supine to Sit 2  Maximal assistance   Additional items Assist x 1;Increased time required;Verbal cues;LE management   Additional Comments Sitting EOB pt with x1 LOB to L side/posteriorly - max A x1 for recovery   Transfers   Sit to Stand 2  Maximal assistance   Additional items Assist x 1;Increased time required;Verbal cues   Stand to Sit 2  Maximal assistance   Additional items Assist x 1;Increased time required;Verbal cues   Stand pivot 2  Maximal assistance   Additional items Assist x 1;Increased time required;Verbal cues  (use of RW, trial towards pt's R side)   Additional Comments use of RW   Functional Mobility   Functional Mobility 2  Maximal assistance   Additional Comments Ax1, able to take x2 side steps with cuing for initiation of both steps   Subjective   Subjective \"I keep saying that I can do things but I probably just can't\" Pt educated on importance of positive self talk   Cognition   Overall Cognitive Status Impaired   Arousal/Participation Alert;Cooperative   Attention " Attends with cues to redirect   Orientation Level Oriented to person;Oriented to place;Disoriented to time;Oriented to situation   Memory Decreased short term memory;Decreased recall of recent events;Decreased recall of precautions   Following Commands Follows one step commands with increased time or repetition   Comments Pt with improved alertness from previous session, increased participation in conversation, continues to require cuing for (I) with tasks   Activity Tolerance   Activity Tolerance Patient tolerated treatment well   Medical Staff Made Aware RUBI Mckay   Assessment   Assessment Pt seen on this date for skilled OT treatment session. At start of session pt supine in bed and saturated with urine. Pt continuing to require max A x1 for bed mobility. Pt noted to require increased (A) for sitting balance at EOB, requiring A for recovery from LOB. Pt with limited reach with LUE for use of wash cloth - using BUE for task. Pt limited by pain in L groin area this session and requiring increased (A) for LB bathing/dressing. Pt with limited command following at times during transfers resulting in pt attempting unsafe transfer and requiring max A from therapist. Pt continues to be limited by cognition, strength, endurance, coordination, ADL performance and functional mobility. Pt would continue to benefit from skilled OT treatment sessions in order to address remaining deficits   Plan   Goal Expiration Date 06/13/24   OT Treatment Day 2   OT Frequency 3-5x/wk   Discharge Recommendation   Rehab Resource Intensity Level, OT II (Moderate Resource Intensity)   AM-PAC Daily Activity Inpatient   Lower Body Dressing 1   Bathing 2   Toileting 2   Upper Body Dressing 3   Grooming 3   Eating 3   Daily Activity Raw Score 14   Daily Activity Standardized Score (Calc for Raw Score >=11) 33.39   AM-PAC Applied Cognition Inpatient   Following a Speech/Presentation 2   Understanding Ordinary Conversation 3   Taking Medications 2    Remembering Where Things Are Placed or Put Away 2   Remembering List of 4-5 Errands 2   Taking Care of Complicated Tasks 2   Applied Cognition Raw Score 13   Applied Cognition Standardized Score 30.46   End of Consult   Patient Position at End of Consult Bedside chair;Bed/Chair alarm activated;All needs within reach     UPDATED GOALS     Pt will complete UB ADLs Mod independent   for increased ADL independence within 10 days. PROGRESSING     Pt will complete LB ADLs min A  for increased ADL independence within 10 days. PROGRESSING     Pt will complete toileting min A  with use of DME for increased ADL independence within 10 days. PROGRESSING     Pt will demonstrate proper body mechanics to complete self-care transfers and functional mobility with Supervision household distances and use of LRAD for increased safety and functional independence within 10 days.      Pt will complete bed mobility min A for increased independence in repositioning, pressure offloading, and managing comfort. PROGRESSING     Pt will demonstrate proper body mechanics and fall prevention strategies during 100% of tx sessions for increased safety awareness during ADL/IADLs     Pt will demonstrate activity tolerance of 30 min in therapeutic tasks for increased participation in meaningful activities upon D/C. PROGRESSING     Pt will participate in ongoing cognitive assessments to assist with safe D/C planning and supervision/assistance recommendations.      Pt will demonstrate OOB sitting tolerance of 2-4 hr/day for increased activity tolerance and engagement in leisure activities within 10 days. PROGRESSING      The patient's raw score on the AM-PAC Daily Activity Inpatient Short Form is 14. A raw score of less than 19 suggests the patient may benefit from discharge to post-acute rehabilitation services. Please refer to the recommendation of the Occupational Therapist for safe discharge planning.    Brinda Yuan MS, OTR/L

## 2024-06-05 NOTE — PLAN OF CARE
Problem: OCCUPATIONAL THERAPY ADULT  Goal: Performs self-care activities at highest level of function for planned discharge setting.  See evaluation for individualized goals.  Description: Treatment Interventions: ADL retraining, Functional transfer training, UE strengthening/ROM, Endurance training, Cognitive reorientation, Patient/family training, Equipment evaluation/education, Compensatory technique education, Continued evaluation (cog assessment)          See flowsheet documentation for full assessment, interventions and recommendations.   Outcome: Progressing  Note: Limitation: Decreased ADL status, Decreased UE strength, Decreased Safe judgement during ADL, Decreased cognition, Decreased endurance, Decreased self-care trans, Decreased high-level ADLs (impaired balance, fxnl mobility, act dulce, FM coord, fxnl reach, trunk control, standing dulce, and strength, attention to task, safety awareness, insight, pacing, problem solving, and learning new tasks, response time)  Prognosis: Good  Assessment: Pt seen on this date for skilled OT treatment session. At start of session pt supine in bed and saturated with urine. Pt continuing to require max A x1 for bed mobility. Pt noted to require increased (A) for sitting balance at EOB, requiring A for recovery from LOB. Pt with limited reach with LUE for use of wash cloth - using BUE for task. Pt limited by pain in L groin area this session and requiring increased (A) for LB bathing/dressing. Pt with limited command following at times during transfers resulting in pt attempting unsafe transfer and requiring max A from therapist. Pt continues to be limited by cognition, strength, endurance, coordination, ADL performance and functional mobility. Pt would continue to benefit from skilled OT treatment sessions in order to address remaining deficits     Rehab Resource Intensity Level, OT: II (Moderate Resource Intensity)

## 2024-06-05 NOTE — PROGRESS NOTES
NEPHROLOGY HOSPITAL PROGRESS NOTE   Melany Griffith 73 y.o. female MRN: [id number]  Unit/Bed#: S -01 Encounter: [id number]  Reason for Consult: Hyponatremia    ASSESSMENT and PLAN:  23-year-old female with history of stage IV lung carcinoma status post chemoradiation and immunotherapy, paroxysmal A-fib, heart failure with reduced ejection fraction, diabetes mellitus type 2, CVA presented with mechanical fall.  We are consulted for management of hyponatremia.    1.  Hyponatremia.  Admission sodium 130.  Baseline serum sodium normal range.  Serum osmolality 286 (?  Isoosmolar). SPEP/UPEP/serum free light chain ratio pending.  Urine osmolality 162 (after tolvaptan dose x2).  Urine sodium less than 10.  Consider SIADH as the likely etiology (Effexor, BuSpar use).  Corrected 133 today.  Diuretics have been held.  Fluid restricted 1500 cc per 24 hours.  Trend BMP.    2.  ADITYA.  Admission creatinine 1.57.  Creatinine today improved to 1.16 on 06/03.  She has a secondary rise in creatinine to 1.46 today.  UA with 4-10 RBC.  Etiology most likely cardiorenal syndrome.  Creatinine overall improved since admission but has increased in the last 24 to 48 hours.  Check kidney ultrasound.  Diuretics on hold.    3.  Bilateral lower extremity edema/fluid overload/chronic diastolic CHF.  Prior echocardiogram with EF 60%, G1 DD.  Status post IV Lasix 40 mg twice daily, transition to oral torsemide 20 mg twice daily on 06/03.  Leg swelling has significantly improved and agree with holding torsemide for now.    4.  Metabolic acidosis.  Serum bicarbonate level improved to 30.  Status post discontinuation of sodium bicarbonate on 06/04.    Discussed with internal medicine team.  After discussion, we agreed that leg swelling has significantly improved and due to increase in creatinine in last 24 to 48 hours to hold further doses of diuretics for now.    SUBJECTIVE / 24H INTERVAL HISTORY:  Urine output recorded as 1100 cc.  Denies  dyspnea.  Continues to have leg swelling.    OBJECTIVE:  Current Weight: Weight - Scale: 66.4 kg (146 lb 6.2 oz)  Vitals:    06/05/24 0318 06/05/24 0507 06/05/24 0730 06/05/24 0746   BP: 121/72  110/72 123/89   Pulse: 68  70 68   Resp:       Temp:   97.5 °F (36.4 °C)    TempSrc:       SpO2: 95%  97% 98%   Weight:  66.4 kg (146 lb 6.2 oz)         Intake/Output Summary (Last 24 hours) at 6/5/2024 0938  Last data filed at 6/5/2024 0931  Gross per 24 hour   Intake 500 ml   Output --   Net 500 ml     Review of Systems   Constitutional:  Negative for chills and fever.   HENT:  Negative for ear pain and sore throat.    Eyes:  Negative for pain and visual disturbance.   Respiratory:  Negative for cough and shortness of breath.    Cardiovascular:  Negative for chest pain and palpitations.   Gastrointestinal:  Negative for abdominal pain and vomiting.   Genitourinary:  Negative for dysuria and hematuria.   Musculoskeletal:  Negative for arthralgias and back pain.   Skin:  Negative for color change and rash.   Neurological:  Negative for seizures and syncope.   All other systems reviewed and are negative.    Physical Exam  Vitals and nursing note reviewed.   Constitutional:       General: She is not in acute distress.     Appearance: She is well-developed.   HENT:      Head: Normocephalic and atraumatic.   Eyes:      Conjunctiva/sclera: Conjunctivae normal.   Cardiovascular:      Rate and Rhythm: Normal rate and regular rhythm.      Pulses: Normal pulses.      Heart sounds: Normal heart sounds. No murmur heard.  Pulmonary:      Effort: Pulmonary effort is normal. No respiratory distress.      Breath sounds: Normal breath sounds.   Abdominal:      Palpations: Abdomen is soft.      Tenderness: There is no abdominal tenderness.   Musculoskeletal:         General: No swelling.      Cervical back: Neck supple.      Right lower leg: No edema.      Left lower leg: No edema.      Comments: Leg swelling is significantly improved    Skin:     General: Skin is warm and dry.      Capillary Refill: Capillary refill takes less than 2 seconds.   Neurological:      Mental Status: She is alert.   Psychiatric:         Mood and Affect: Mood normal.       Medications:    Current Facility-Administered Medications:     acetaminophen (TYLENOL) tablet 975 mg, 975 mg, Oral, Q8H PRN, Rito Sheridan DO, 975 mg at 06/01/24 2355    apixaban (ELIQUIS) tablet 5 mg, 5 mg, Oral, BID, Rito Sheridan DO, 5 mg at 06/04/24 1722    aspirin (ECOTRIN LOW STRENGTH) EC tablet 81 mg, 81 mg, Oral, Daily, Rito Sheridan DO, 81 mg at 06/04/24 0809    atorvastatin (LIPITOR) tablet 40 mg, 40 mg, Oral, Daily, Rito Sheridan DO, 40 mg at 06/04/24 0809    busPIRone (BUSPAR) tablet 7.5 mg, 7.5 mg, Oral, HS, Rito Sheridan DO, 7.5 mg at 06/04/24 2113    cyanocobalamin (VITAMIN B-12) tablet 100 mcg, 100 mcg, Oral, Daily, Rito Sheridan DO, 100 mcg at 06/04/24 0809    folic acid (FOLVITE) tablet 1 mg, 1 mg, Oral, Daily, Rito Sheridan DO, 1 mg at 06/04/24 0809    gabapentin (NEURONTIN) capsule 200 mg, 200 mg, Oral, BID, Rito Sheridan DO, 200 mg at 06/04/24 0809    insulin lispro (HumALOG/ADMELOG) 100 units/mL subcutaneous injection 1-5 Units, 1-5 Units, Subcutaneous, TID AC, 1 Units at 06/05/24 0745 **AND** Fingerstick Glucose (POCT), , , TID AC, Rito Sheridan DO    insulin lispro (HumALOG/ADMELOG) 100 units/mL subcutaneous injection 1-5 Units, 1-5 Units, Subcutaneous, HS, Rito Sheridan DO, 1 Units at 06/04/24 2121    melatonin tablet 3 mg, 3 mg, Oral, HS, Marta Lazo MD, 3 mg at 06/04/24 2113    metoprolol tartrate (LOPRESSOR) tablet 50 mg, 50 mg, Oral, Q12H, Emmanuelle Velasquez MD, 50 mg at 06/05/24 0749    oxyCODONE (ROXICODONE) split tablet 2.5 mg, 2.5 mg, Oral, Q4H PRN **OR** oxyCODONE (ROXICODONE) IR tablet 5 mg, 5 mg, Oral, Q4H PRN, Kyle Brunner, MD, 5 mg at 06/04/24 1538    pantoprazole (PROTONIX) EC tablet 40 mg, 40 mg, Oral, Early Morning, Rito Sheridan DO, 40 mg at 06/05/24 0510     "polyethylene glycol (MIRALAX) packet 17 g, 17 g, Oral, Daily, Rito Sheridan DO, 17 g at 06/04/24 0809    prochlorperazine (COMPAZINE) tablet 10 mg, 10 mg, Oral, TID PRN, Rito Sheridan DO    senna (SENOKOT) tablet 17.2 mg, 2 tablet, Oral, Daily, Shawn Ferrer DO    sodium chloride 0.9 % bolus 500 mL, 500 mL, Intravenous, Once, Shawn Ferrer DO    sotalol (BETAPACE) tablet 80 mg, 80 mg, Oral, Q12H STEPHIE, Emmanuelle Velasquez MD, 80 mg at 06/04/24 2113    Sotorasib TABS 960 mg, 960 mg, Oral, Daily, Rito Sheridan DO, 960 mg at 06/04/24 0810    venlafaxine (EFFEXOR) tablet 100 mg, 100 mg, Oral, HS, Matra Lazo MD, 100 mg at 06/04/24 2115    Laboratory Results:  Results from last 7 days   Lab Units 06/05/24  0503 06/04/24  0530 06/03/24  0536 06/02/24  2055 06/02/24  1352 06/02/24  0449 06/01/24  1745 06/01/24  1259 06/01/24  0643 06/01/24  0610 05/31/24  0429 05/30/24  0534   WBC Thousand/uL  --  12.45* 14.97*  --   --  13.75*  --   --  13.79*  --  14.51* 11.71*   HEMOGLOBIN g/dL  --  9.1* 9.8*  --   --  9.2*  --   --  9.3*  --  8.8* 9.3*   HEMATOCRIT %  --  31.9* 33.4*  --   --  31.6*  --   --  31.8*  --  30.7* 32.4*   PLATELETS Thousands/uL  --  371 371  --   --  388  --   --  381  --  376 390   POTASSIUM mmol/L 3.5 3.8 3.8 4.8 4.2 3.5 4.4   < >  --    < > 3.6 3.6   CHLORIDE mmol/L 92* 95* 96 95* 95* 94* 96   < >  --    < > 97 97   CO2 mmol/L 30 30 28 24 23 24 18*   < >  --    < > 24 24   BUN mg/dL 31* 25 24 27* 27* 27* 25   < >  --    < > 30* 39*   CREATININE mg/dL 1.46* 1.23 1.16 1.17 1.19 1.27 1.12   < >  --    < > 1.17 1.51*   CALCIUM mg/dL 8.7 9.0 9.2 8.6 9.0 9.1 8.7   < >  --    < > 8.9 9.0   MAGNESIUM mg/dL  --   --   --   --   --   --   --   --   --   --  2.2 1.6*    < > = values in this interval not displayed.       Portions of the record may have been created with voice recognition software. Occasional wrong word or \"sound a like\" substitutions may have occurred due to the inherent limitations " of voice recognition software. Read the chart carefully and recognize, using context, where substitutions have occurred. If you have any questions, please contact the dictating provider.     No

## 2024-06-06 LAB
ALBUMIN SERPL BCP-MCNC: 3 G/DL (ref 3.5–5)
ALP SERPL-CCNC: 280 U/L (ref 34–104)
ALT SERPL W P-5'-P-CCNC: 16 U/L (ref 7–52)
ANION GAP SERPL CALCULATED.3IONS-SCNC: 12 MMOL/L (ref 4–13)
AST SERPL W P-5'-P-CCNC: 20 U/L (ref 13–39)
BILIRUB SERPL-MCNC: 0.5 MG/DL (ref 0.2–1)
BUN SERPL-MCNC: 34 MG/DL (ref 5–25)
CALCIUM ALBUM COR SERPL-MCNC: 9.4 MG/DL (ref 8.3–10.1)
CALCIUM SERPL-MCNC: 8.6 MG/DL (ref 8.4–10.2)
CHLORIDE SERPL-SCNC: 93 MMOL/L (ref 96–108)
CO2 SERPL-SCNC: 24 MMOL/L (ref 21–32)
CREAT SERPL-MCNC: 1.57 MG/DL (ref 0.6–1.3)
GFR SERPL CREATININE-BSD FRML MDRD: 32 ML/MIN/1.73SQ M
GLUCOSE SERPL-MCNC: 163 MG/DL (ref 65–140)
GLUCOSE SERPL-MCNC: 175 MG/DL (ref 65–140)
GLUCOSE SERPL-MCNC: 213 MG/DL (ref 65–140)
GLUCOSE SERPL-MCNC: 215 MG/DL (ref 65–140)
GLUCOSE SERPL-MCNC: 250 MG/DL (ref 65–140)
POTASSIUM SERPL-SCNC: 3.6 MMOL/L (ref 3.5–5.3)
PROT SERPL-MCNC: 6 G/DL (ref 6.4–8.4)
SODIUM SERPL-SCNC: 129 MMOL/L (ref 135–147)

## 2024-06-06 PROCEDURE — 82607 VITAMIN B-12: CPT | Performed by: PSYCHIATRY & NEUROLOGY

## 2024-06-06 PROCEDURE — 99232 SBSQ HOSP IP/OBS MODERATE 35: CPT | Performed by: PSYCHIATRY & NEUROLOGY

## 2024-06-06 PROCEDURE — 99232 SBSQ HOSP IP/OBS MODERATE 35: CPT | Performed by: INTERNAL MEDICINE

## 2024-06-06 PROCEDURE — 80053 COMPREHEN METABOLIC PANEL: CPT

## 2024-06-06 PROCEDURE — 82948 REAGENT STRIP/BLOOD GLUCOSE: CPT

## 2024-06-06 PROCEDURE — 99233 SBSQ HOSP IP/OBS HIGH 50: CPT | Performed by: INTERNAL MEDICINE

## 2024-06-06 RX ORDER — SODIUM CHLORIDE 1 G/1
2 TABLET ORAL 2 TIMES DAILY WITH MEALS
Status: COMPLETED | OUTPATIENT
Start: 2024-06-06 | End: 2024-06-06

## 2024-06-06 RX ADMIN — Medication 2.5 MG: at 04:43

## 2024-06-06 RX ADMIN — ASPIRIN 81 MG: 81 TABLET, COATED ORAL at 09:23

## 2024-06-06 RX ADMIN — METOPROLOL TARTRATE 50 MG: 50 TABLET, FILM COATED ORAL at 09:23

## 2024-06-06 RX ADMIN — INSULIN LISPRO 1 UNITS: 100 INJECTION, SOLUTION INTRAVENOUS; SUBCUTANEOUS at 09:21

## 2024-06-06 RX ADMIN — ATORVASTATIN CALCIUM 40 MG: 40 TABLET, FILM COATED ORAL at 09:22

## 2024-06-06 RX ADMIN — SOTALOL HYDROCHLORIDE 80 MG: 80 TABLET ORAL at 09:23

## 2024-06-06 RX ADMIN — SENNOSIDES 17.2 MG: 8.6 TABLET, FILM COATED ORAL at 09:23

## 2024-06-06 RX ADMIN — GABAPENTIN 200 MG: 100 CAPSULE ORAL at 17:33

## 2024-06-06 RX ADMIN — SODIUM CHLORIDE 2 G: 1 TABLET ORAL at 09:54

## 2024-06-06 RX ADMIN — VENLAFAXINE 100 MG: 25 TABLET ORAL at 21:29

## 2024-06-06 RX ADMIN — GABAPENTIN 200 MG: 100 CAPSULE ORAL at 09:23

## 2024-06-06 RX ADMIN — APIXABAN 5 MG: 5 TABLET, FILM COATED ORAL at 09:23

## 2024-06-06 RX ADMIN — INSULIN LISPRO 1 UNITS: 100 INJECTION, SOLUTION INTRAVENOUS; SUBCUTANEOUS at 12:30

## 2024-06-06 RX ADMIN — Medication 3 MG: at 21:29

## 2024-06-06 RX ADMIN — POLYETHYLENE GLYCOL 3350 17 G: 17 POWDER, FOR SOLUTION ORAL at 09:23

## 2024-06-06 RX ADMIN — INSULIN LISPRO 1 UNITS: 100 INJECTION, SOLUTION INTRAVENOUS; SUBCUTANEOUS at 17:33

## 2024-06-06 RX ADMIN — METOPROLOL TARTRATE 50 MG: 50 TABLET, FILM COATED ORAL at 21:29

## 2024-06-06 RX ADMIN — BUSPIRONE HYDROCHLORIDE 7.5 MG: 5 TABLET ORAL at 21:29

## 2024-06-06 RX ADMIN — APIXABAN 5 MG: 5 TABLET, FILM COATED ORAL at 17:33

## 2024-06-06 RX ADMIN — SODIUM CHLORIDE 2 G: 1 TABLET ORAL at 17:33

## 2024-06-06 RX ADMIN — VITAM B12 100 MCG: 100 TAB at 09:23

## 2024-06-06 RX ADMIN — FOLIC ACID 1 MG: 1 TABLET ORAL at 09:22

## 2024-06-06 RX ADMIN — PANTOPRAZOLE SODIUM 40 MG: 40 TABLET, DELAYED RELEASE ORAL at 04:43

## 2024-06-06 RX ADMIN — INSULIN LISPRO 1 UNITS: 100 INJECTION, SOLUTION INTRAVENOUS; SUBCUTANEOUS at 21:30

## 2024-06-06 RX ADMIN — SOTALOL HYDROCHLORIDE 80 MG: 80 TABLET ORAL at 21:29

## 2024-06-06 NOTE — ASSESSMENT & PLAN NOTE
Wt Readings from Last 3 Encounters:   06/06/24 68 kg (149 lb 14.6 oz)   05/21/24 69.3 kg (152 lb 12.8 oz)   05/20/24 71.4 kg (157 lb 8 oz)     Last Echo in 2022 showed EF 60, grade 1 diastolic dysfunction; home Lasix recently increased to 40 mg daily prn for swelling likely causing ADITYA.    Torsemide held 6/5 due to abrupt elevation in Cr overnight; lower Extremity Edema appears to be improving since admission; Nephrology following; patient denied any SOB/coughs  Lower extremity edema significantly improved    Plan:  Continue fluid restriction 2000 ml/day  Monitor Urine Output; I/O's  Diuretics held; transition to oral torsemide 10 mg after discharge

## 2024-06-06 NOTE — CASE MANAGEMENT
Case Management Progress Note    Patient name Melany Griffith  Location S /S -01 MRN [id number]  : 1950 Date 2024       LOS (days): 8  Geometric Mean LOS (GMLOS) (days): 4.4  Days to GMLOS:-3.5        OBJECTIVE:        Current admission status: Inpatient  Preferred Pharmacy:   [facility]pharm[facility][id number] - [place] [facility] [address] [place] [address]  Phone: [phone] Fax: [phone]    [facility] - [place], [place] - [address] [place] [address]  Phone: [phone] Fax: [phone]    Primary Care Provider: Salina Dwyer DO    Primary Insurance: MEDICARE  Secondary Insurance: AARP    PROGRESS NOTE:    Per SLIM - pt's Cr remains elevated will continue to monitor. CM provided update to  TCF via aidin - facility following.    CM to continue to follow for coordination of pt transition to  TCF when ready.

## 2024-06-06 NOTE — ASSESSMENT & PLAN NOTE
Recent Labs     06/05/24  0503 06/06/24  0458 06/07/24  0544   SODIUM 132* 129* 132*       Likely 2/2 SIADH in the setting of Adenocarcinoma of the Lung - stage 4   Encouraged PO intake - but patient is also fluid restricted due to her CHF.   Hyponatremia stable at 132    Plan:  Nephrology following; cleared by nephrology  Salt tablets 1 g x2

## 2024-06-06 NOTE — WOUND OSTOMY CARE
Progress Note - Wound   Melany Griffith 73 y.o. female MRN: [id number]  Unit/Bed#: S -01 Encounter: [id number]      Assessment:     Patient is a 74 yo female that was admitted to [facility]  for treatment of ADITYA on CKD. Patient has a PMH of  stage IV lung adenocarcinoma s/p chemo, radiation, immunotherapy, currently on Sotorasib, paroxysmal afib on eliquis, HFrEF, DM2, CVA . Patient is alert and oriented times three and agreeable to assessment. Patient is assist of one for turning and repositioning, assist for care, independent with meals. Nutrition consulted and following. Patient is continent of bowel and bladder. On assessment, patient is in bed, with pillows in place for offloading.     Wound Care is following for right buttock wound.      POA Right Buttock pressure injury Stage 2 - small irregular shaped area of partial thickness skin loss. Wound bed is 100% pink non blanchable dry tissue. Beth wound is pink, hyperpigmented, blanchable. No drainage appreciated. Suspect etiology of pressure and friction.      Left Buttock, b/l hips, heels - pink, dry, intact, blanchable     Educated patient on importance of frequent offloading of pressure via turning, repositioning, and weight-shifting.     No induration, fluctuance, odor, warmth, or purulence noted to the above noted wounds. calazime applied. Patient tolerated well, reports mild pain to the wounds. Primary nurse aware of plan of care. See flow sheets for more detailed assessment findings. Will follow along.    Skin care plans:  1- Wash Sacro Buttocks area with soap and water. Pat Dry. Apply Calazime to right buttocks TID and PRN.  2-Hydraguard to left buttock, b/l hips, b/l heels BID and PRN.  3-Elevate heels to offload pressure.  4-Ehob cushion when out of bed.  5-Turn/reposition q2h or when medically stable for pressure re-distribution on skin.  6-Moisturize skin daily with skin nourishing cream    Wound 05/29/24 Pressure Injury Buttocks Right (Active)    Wound Image   06/06/24 1147   Wound Description Fragile;Pink 06/06/24 1147   Pressure Injury Stage 2 06/06/24 1147   Beth-wound Assessment Erythema;Scaly 06/06/24 1147   Wound Length (cm) 0.5 cm 06/06/24 1147   Wound Width (cm) 0.5 cm 06/06/24 1147   Wound Depth (cm) 0.1 cm 06/06/24 1147   Wound Surface Area (cm^2) 0.25 cm^2 06/06/24 1147   Wound Volume (cm^3) 0.025 cm^3 06/06/24 1147   Calculated Wound Volume (cm^3) 0.03 cm^3 06/06/24 1147   Change in Wound Size % 0 06/06/24 1147   Drainage Amount None 06/06/24 1147   Non-staged Wound Description Partial thickness 06/06/24 1147   Treatments Cleansed;Site care 06/06/24 1147   Dressing Protective barrier 06/06/24 1147   Wound packed? No 06/06/24 1147   Packing- # removed 0 06/06/24 1147   Packing- # inserted 0 06/06/24 1147   Dressing Changed New 06/02/24 2000   Patient Tolerance Tolerated well 06/06/24 1147   Dressing Status Clean;Dry;Intact 06/06/24 0800       Call or tigertext with any questions  Wound Care will continue to follow while inpatient.     Laury Chan BSN RN CCRN  Wound and Ostomy Care

## 2024-06-06 NOTE — ASSESSMENT & PLAN NOTE
Lab Results   Component Value Date    HGBA1C 7.2 (H) 05/08/2024       Recent Labs     06/05/24  0727 06/05/24  1132 06/05/24  1530 06/05/24 2031   POCGLU 160* 194* 160* 150*         Blood Sugar Average: Last 72 hrs:  (P) 186.6875    Hold home medications. SSI algorithm 1, titrate as needed.

## 2024-06-06 NOTE — ASSESSMENT & PLAN NOTE
2023  Pre-operative evaluation for Procedure(s) (LRB):  RECONSTRUCTION, LIGAMENT MPFL PABLO TYPE--[facility] CARE (Left)  REPAIR, KNEE, ARTHROSCOPIC, WITH OSTEOCHONDRAL GRAFT TRANSFER ALLOGRAFT (Left)  OSTEOTOMY, TIBIA TUBERCLE (Left)    Maria L Joseph is a 12 y.o. female     Patient Active Problem List   Diagnosis    Lower abdominal pain    Fatigue    Dysuria       Review of patient's allergies indicates:   Allergen Reactions    Penicillins Hives and Rash       No current facility-administered medications on file prior to encounter.     Current Outpatient Medications on File Prior to Encounter   Medication Sig Dispense Refill    albuterol sulfate (PROAIR RESPICLICK) 90 mcg/actuation inhaler Inhale 2 puffs into the lungs every 4 (four) hours as needed.      cetirizine (ZYRTEC) 10 MG tablet Take 10 mg by mouth.         No past surgical history on file.    Social History     Socioeconomic History    Marital status: Single   Substance and Sexual Activity    Alcohol use: Never    Drug use: Never         CBC: No results for input(s): WBC, RBC, HGB, HCT, PLT, MCV, MCH, MCHC in the last 72 hours.    CMP: No results for input(s): NA, K, CL, CO2, BUN, CREATININE, GLU, MG, PHOS, CALCIUM, ALBUMIN, PROT, ALKPHOS, ALT, AST, BILITOT in the last 72 hours.    INR  No results for input(s): PT, INR, PROTIME, APTT in the last 72 hours.        Diagnostic Studies:      EKD Echo:  No results found for this or any previous visit.      Pre-op Assessment    I have reviewed the Patient Summary Reports.     I have reviewed the Nursing Notes. I have reviewed the NPO Status.   I have reviewed the Medications.     Review of Systems      Physical Exam  General: Well nourished and Cooperative    Airway:  Mallampati: II   Mouth Opening: Normal  TM Distance: Normal  Tongue: Normal  Neck ROM: Normal  Lab Results   Component Value Date    HGBA1C 7.2 (H) 05/08/2024       Recent Labs     06/05/24  1132 06/05/24  1530 06/05/24 2031 06/06/24  0807   POCGLU 194* 160* 150* 213*       Blood Sugar Average: Last 72 hrs:  (P) [id number]    Hold home medications. SSI algorithm 1, titrate as needed.    ROM    Chest/Lungs:  Clear to auscultation, Normal Respiratory Rate    Heart:  Rate: Normal  Rhythm: Regular Rhythm  Sounds: Normal        Anesthesia Plan  Type of Anesthesia, risks & benefits discussed:    Anesthesia Type: Gen ETT, Gen Supraglottic Airway  Intra-op Monitoring Plan: Standard ASA Monitors  Post Op Pain Control Plan: multimodal analgesia and IV/PO Opioids PRN  Induction:  IV  Airway Plan: Direct and Video, Post-Induction  Informed Consent: Informed consent signed with the Patient and all parties understand the risks and agree with anesthesia plan.  All questions answered.   ASA Score: 1    Ready For Surgery From Anesthesia Perspective.     .

## 2024-06-06 NOTE — ASSESSMENT & PLAN NOTE
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   Encouraged PO intake - but patient is also fluid restricted due to her CHF.   Will monitor her closely  Denied any new symptom development since yesterday.   Follow up AM labs  Continue follow up with Nephrology.   Adjust regimen accordingly

## 2024-06-06 NOTE — ASSESSMENT & PLAN NOTE
Malnutrition Findings:   Adult Malnutrition type: Chronic illness  Adult Degree of Malnutrition: Other severe protein calorie malnutrition  Malnutrition Characteristics: Weight loss, Inadequate energy, Muscle loss                  360 Statement: Chronic/severe malnutrition r/t decreased PO intake/condition, as evidenced by 9.5% wt loss x 3 months (5/21/24: 152#, 2/27/24: 168#),  intake meeting <75% of estimated needs for > 1 month, and severe muscle mass depletion (temporal region). Treatment: liberal PO diet + nutrition supplements    BMI Findings:           Body mass index is 27.42 kg/m².   Remeron 7.5 tonight for appetite, sleep and mood  D/c remeron given sedation. Consider restarting outpatient once renal function stable  Palliative consulted

## 2024-06-06 NOTE — ASSESSMENT & PLAN NOTE
Met SIRS with HR 90s and WBC >12. Suspect urinary source given UA on admission. Patient unreliable and unable to give symptomatic history.   Treated UTI with ceftriaxone; 3 day course completed on 6/2   S/p IV hydration, blood pressures supported without further fluids  Monitor off antibiotics   You can take acetaminophen (aka tylenol, 1000 mg every 6-8 hours) or ibuprofen (600 mg every 6-8 hours) for pain. Do not exceed 4000 mg acetaminophen (tylenol) in a 24 hour period. Be aware if any of your other medications contain acetaminophen so as not to exceed the maximum daily dose.    Follow up with your primary care doctor in 2-3 days. Make sure to discuss the finding of a possible nodule on the chest xray. This likely requires a CT scan to assess further.    Return to the Emergency Department for severe intractable pain, vomiting, difficulty breathing, chest pain, or any other new concerning symptoms.

## 2024-06-06 NOTE — ASSESSMENT & PLAN NOTE
S/p chemo, radiation, and immunotherapy. Follows w/ Dr. Sam outpatient. Currently on Sotorasib only.   Patient's Sotorasib is running low and will need refill   Spoke with daughter Liz on 6/6 patient needs more Sotorasib while inpatient; family will be bringing more today  Follow-up with Heme/Onc Dr. Sam  Palliative consulted for goals of care discussion

## 2024-06-06 NOTE — PROGRESS NOTES
Progress Note - Neurology   Melany Griffith 73 y.o. female [id number]  Unit/Bed#: S /S -01    Assessment:    Fall  Assessment & Plan  73-year-old female with Afib on Eliquis, arthritis, DM, HTN, GERD, HLD, stage IV lung cancer s/p chemo radiation, CHF, prior stroke with residual memory deficit, who was admitted approximately a week ago 5/28 after having a fall at home.  RR was called on 6/2 because the staff heard a noise and found her on the floor.  Patient reports that she had wanted to stand up as she wondered if she had accidentally urinated or wet herself.  Apparently when she went to stand up she found that she was weak, which caused her fall but did not have head strike.  Due to LLE weakness noted, Neurology was consulted for further evaluation. Unclear etiology at this time- attempted to obtain MRI brain and c-spine but unable to due to patient not being able to tolerate laying still for it, even with pre-medication. DWI sequence was obtained during first attempt at MRI brain and was reviewed by attending neurologist- no acute infarct noted. Alk phos and kidney function trending up as of 6/5. Workup as noted below.    Workup:  -5/28 CT head: No acute intracranial abnormality.  -5/28 CT cervical spine: No cervical spine fracture or traumatic malalignment.   -6/2 CT head: No acute intracranial abnormality.  -6/2 CT cervical spine: No cervical spine fracture or traumatic malalignment.   -6/2 CT thoracic spine: No acute osseous pathology. New, small right pleural effusion. May be nontraumatic. No rib fracture, lung contusion or pneumothorax.  -6/3 CT head: No acute intracranial abnormality.  -6/3 L hip xray: No acute osseous abnormality.   -6/3 L knee xray: No acute osseous abnormality.   -MRI brain:  1.  No acute infarct. Sequela of chronic infarcts in the right corpus striatum and left putamen.  2.  Cannot reliably evaluate the brain parenchyma due to motion distorted image quality in the FLAIR  sequences and lack of T2 sequence. Consider repeat imaging in more stable state and/or with sedative/anxiolytic medications.  -Labs: TSH 1.576, 5/8/24 A1c 7.2, LDL 38    Plan:  - Stroke pathway  MRI brain and cervical spine w/wo contrast  Discussed with attending neurologist- may hold off at this time as patient was unable to tolerate MRI even with pre-medication with Ativan. If patient's myoclonus does not improve while kidney function improves, then would recommend attempting MRI again with different pre-medication (possibly Seroquel).  No acute infarct on DWI sequence; hold off echo  Aspirin 81 mg   Atorvastatin 40 mg  Goal normotension; avoid hypotension  Continue telemetry  PT/OT/ST  Frequent neuro checks. Continue to monitor and notify neurology with any changes.   - Eliquis 5 mg BID per home regimen  - Medical management and supportive care per primary team. Correction of any metabolic or infectious disturbances.   - No further inpatient neurology recommendations at this time. Please call with any questions.       Melany Griffith will need follow up in in 6 weeks with general attending/AP .  She will require a repeat MRI within 6 weeks.     Case and treatment plan reviewed with attending neurologist, Dr. Osborne. Please see attending attestation for any further recommendations.    Subjective:   Patient resting in bed. She is drowsy but states she is always sleepy in the morning. She denies any pain at this time.        Past Medical History:   Diagnosis Date    A-fib (HCC)     Arthritis     Atrial fibrillation (HCC)     Confusion     Diabetes mellitus (HCC)     Diabetes mellitus type 2, uncomplicated (HCC)     Last assessed: 8/17/17    Encephalopathy 1/6/2022    Essential hypertension     Frozen shoulder     L shoulder    GERD (gastroesophageal reflux disease)     Hx of cancer of uterus     Last assessed: 8/21/15    Hyperlipidemia     Hypertension     Hyponatremia 11/16/2016    Lung mass     diagnosed 9/2016     Malignant neoplasm without specification of site (HCC)     Skin cancer, basal cell     right eye area    Stage 4 lung cancer (HCC)     Thrombocytopenia, unspecified (HCC) 1/20/2023     Past Surgical History:   Procedure Laterality Date    APPENDECTOMY      BRONCHOSCOPY N/A 11/17/2016    Procedure: BRONCHOSCOPY FLEXIBLE;  Surgeon: Giles Alonso MD;  Location: [facility];  Service:     CHOLECYSTECTOMY      COLONOSCOPY      COLONOSCOPY      FL GUIDED CENTRAL VENOUS ACCESS DEVICE INSERTION  12/14/2021    GALLBLADDER SURGERY      HYSTERECTOMY  2000    Total abdominal    JOINT REPLACEMENT      LARYNGOSCOPY      Flexible Fiberoptic, (Therapeutic), Resolved: 11/17/16    MOHS SURGERY      Micrographic Surgery Face    MN BRNCHSC INCL FLUOR GDNCE DX W/CELL WASHG SPX N/A 5/24/2017    Procedure: BRONCHOSCOPY FLEXIBLE;  Surgeon: Denzel Manning MD;  Location: [facility];  Service: Pulmonary    MN BRONCHOSCOPY NEEDLE BX TRACHEA MAIN STEM&/BRON N/A 11/17/2016    Procedure: EBUS; FROZEN SECTION ;  Surgeon: Giles Alonso MD;  Location: [facility];  Service: Thoracic    MN INSJ TUNNELED CTR VAD W/SUBQ PORT AGE 5 YR/> N/A 12/14/2021    Procedure: INSERTION VENOUS PORT ( PORT-A-CATH) IR;  Surgeon: Hansel Garduno DO;  [facility];  Service: Interventional Radiology    TONSILECTOMY AND ADNOIDECTOMY      TONSILLECTOMY      TOTAL KNEE ARTHROPLASTY Right 09/23/2014     Family History   Problem Relation Age of Onset    Heart disease Father         cardiac disorder    Hypertension Father     Arthritis Father     Stroke Father         cerebrovascular accident    Skin cancer Father     Diabetes Mother     Heart disease Mother     Dementia Mother     Hypertension Mother     Thyroid disease Mother     Cancer Maternal Grandfather         of unknown origin    Cancer Family     Depression Family     Diabetes Family     Hyperlipidemia Family         essential    Heart disease Family     Hypertension Family     Stroke Family          syndrome    Lung cancer Paternal Uncle     Muscular dystrophy Brother      Social History     Socioeconomic History    Marital status:      Spouse name: None    Number of children: None    Years of education: None    Highest education level: None   Occupational History    Occupation: retired   Tobacco Use    Smoking status: Former     Current packs/day: 0.00     Average packs/day: 1 pack/day for 50.0 years (50.0 ttl pk-yrs)     Types: Cigarettes     Start date:      Quit date:      Years since quittin.4     Passive exposure: Past    Smokeless tobacco: Never    Tobacco comments:     Quit in    Vaping Use    Vaping status: Never Used   Substance and Sexual Activity    Alcohol use: No    Drug use: No    Sexual activity: Not Currently   Other Topics Concern    None   Social History Narrative    ** Merged History Encounter **         Family dynamics: Supportive  Relationship status:    Children and Ages:1 adult dtr Liz, 1 adult son Rito  Other important family information: Caregiver for her brother Patel, who is disabled  Living situation: Lives with brother        Employm    ent history/source of income: Worked as a  for [employer] prior to longterm   Spirituality/ Mosque: Quaker    Patient's strengths, social supports, and resources: Supportive family  Hobbies/Interests: Sewing; Crafting  [employer]    e: States [place] is her POA       Social Determinants of Health     Financial Resource Strain: Low Risk  (2023)    Overall Financial Resource Strain (CARDIA)     Difficulty of Paying Living Expenses: Not hard at all   Food Insecurity: No Food Insecurity (2024)    Hunger Vital Sign     Worried About Running Out of Food in the Last Year: Never true     Ran Out of Food in the Last Year: Never true   Transportation Needs: No Transportation Needs (2024)    PRAPARE - Transportation     Lack of Transportation (Medical): No     Lack of Transportation  (Non-Medical): No   Physical Activity: Not on file   Stress: Not on file   Social Connections: Not on file   Intimate Partner Violence: Not on file   Housing Stability: Low Risk  (5/30/2024)    Housing Stability Vital Sign     Unable to Pay for Housing in the Last Year: No     Number of Times Moved in the Last Year: 0     Homeless in the Last Year: No         Medications:  All current active meds have been reviewed and current meds:  Scheduled Meds:  Current Facility-Administered Medications   Medication Dose Route Frequency Provider Last Rate    acetaminophen  975 mg Oral Q8H PRN Rito Granadoes, DO      apixaban  5 mg Oral BID Rito Fatimah, DO      aspirin  81 mg Oral Daily Rito Loyjeremi, DO      atorvastatin  40 mg Oral Daily Rito Loyes, DO      busPIRone  7.5 mg Oral HS Rito Loyjeremi, DO      cyanocobalamin  100 mcg Oral Daily Rito Simes, DO      folic acid  1 mg Oral Daily Rito Simes, DO      gabapentin  200 mg Oral BID Rito Loyjeremi, DO      insulin lispro  1-5 Units Subcutaneous TID AC Rito Sheridan, DO      insulin lispro  1-5 Units Subcutaneous HS Rito Sheridan, DO      melatonin  3 mg Oral HS Marta Lazo MD      metoprolol tartrate  50 mg Oral Q12H Emmanuelle Velasquez MD      oxyCODONE  2.5 mg Oral Q4H PRN Kyle Brunner, MD      Or    oxyCODONE  5 mg Oral Q4H PRN Kyle Brunner, MD      pantoprazole  40 mg Oral Early Morning Rito Sheridan, DO      polyethylene glycol  17 g Oral Daily Rito Sheridan, DO      prochlorperazine  10 mg Oral TID PRN Rito Sheridan, DO      senna  2 tablet Oral Daily Shawn Ferrer, DO      sodium chloride  2 g Oral BID With Meals Shaun Banegas MD      sotalol  80 mg Oral Q12H [facility] Emmanuelle Velasquez MD      Sotorasib  960 mg Oral Daily Rito Loyjeremi, DO      venlafaxine  100 mg Oral HS Marta Lazo MD       Continuous Infusions:   PRN Meds:.  acetaminophen    oxyCODONE **OR** oxyCODONE    prochlorperazine       ROS:   A 12 system ROS was completed. Other than the above mentioned  complaints in the HPI and those commented on below, all remaining systems were negative.      Vitals:   /79   Pulse 61   Temp 98.1 °F (36.7 °C)   Resp 14   Wt 68 kg (149 lb 14.6 oz)   LMP  (LMP Unknown)   SpO2 97%   BMI 27.42 kg/m²       Physical Exam:   Physical Exam  Vitals and nursing note reviewed.   Constitutional:       General: She is not in acute distress.     Appearance: She is not ill-appearing or diaphoretic.   HENT:      Head: Normocephalic.      Mouth/Throat:      Mouth: Mucous membranes are moist.      Pharynx: Oropharynx is clear.   Eyes:      General: No scleral icterus.        Right eye: No discharge.         Left eye: No discharge.      Extraocular Movements: Extraocular movements intact and EOM normal.      Conjunctiva/sclera: Conjunctivae normal.   Cardiovascular:      Rate and Rhythm: Normal rate.   Pulmonary:      Effort: Pulmonary effort is normal. No respiratory distress.   Skin:     General: Skin is warm and dry.      Coloration: Skin is not jaundiced or pale.   Neurological:      Mental Status: She is alert and oriented to person, place, and time.      Deep Tendon Reflexes:      Reflex Scores:       Bicep reflexes are 1+ on the right side and 1+ on the left side.       Brachioradialis reflexes are 1+ on the right side and 1+ on the left side.       Patellar reflexes are 1+ on the right side and 0 on the left side.  Psychiatric:         Mood and Affect: Mood normal.       Neurologic Exam     Mental Status   Oriented to person, place, and time.   Drowsy but arousable to verbal and  tactile stimuli. Able to follow commands appropriately. No aphasia or dysarthria noted.     Cranial Nerves     CN II   Visual fields full to confrontation.     CN III, IV, VI   Extraocular motions are normal.     CN VII   Facial expression full, symmetric.     CN VIII   Hearing: intact    CN IX, X   Palate: symmetric    CN XI   CN XI normal.     CN XII   CN XII normal.     Motor Exam   Muscle bulk:  normal  Formal strength exam limited due to drowsiness and poor effort  Bilateral UE strength 5/5 biceps, triceps, hand   R LE strength 4/5 hip flexion, 5/5 knee flexion, 5/5 dorsiflexion  L LE strength 2/5 hip flexion, 5/5 knee flexion, 5/5 dorsiflexion     Sensory Exam   Light touch normal.     Gait, Coordination, and Reflexes     Tremor   Resting tremor: absent  Intention tremor: absent    Reflexes   Right brachioradialis: 1+  Left brachioradialis: 1+  Right biceps: 1+  Left biceps: 1+  Right patellar: 1+  Left patellar: 0  Bilateral UE asterixis noted           Labs: I have personally reviewed pertinent reports.   Recent Results (from the past 24 hour(s))   Fingerstick Glucose (POCT)    Collection Time: 06/05/24  3:30 PM   Result Value Ref Range    POC Glucose 160 (H) 65 - 140 mg/dl   Fingerstick Glucose (POCT)    Collection Time: 06/05/24  8:31 PM   Result Value Ref Range    POC Glucose 150 (H) 65 - 140 mg/dl   Comprehensive metabolic panel    Collection Time: 06/06/24  4:58 AM   Result Value Ref Range    Sodium 129 (L) 135 - 147 mmol/L    Potassium 3.6 3.5 - 5.3 mmol/L    Chloride 93 (L) 96 - 108 mmol/L    CO2 24 21 - 32 mmol/L    ANION GAP 12 4 - 13 mmol/L    BUN 34 (H) 5 - 25 mg/dL    Creatinine 1.57 (H) 0.60 - 1.30 mg/dL    Glucose 250 (H) 65 - 140 mg/dL    Calcium 8.6 8.4 - 10.2 mg/dL    Corrected Calcium 9.4 8.3 - 10.1 mg/dL    AST 20 13 - 39 U/L    ALT 16 7 - 52 U/L    Alkaline Phosphatase 280 (H) 34 - 104 U/L    Total Protein 6.0 (L) 6.4 - 8.4 g/dL    Albumin 3.0 (L) 3.5 - 5.0 g/dL    Total Bilirubin 0.50 0.20 - 1.00 mg/dL    eGFR 32 ml/min/1.73sq m   Fingerstick Glucose (POCT)    Collection Time: 06/06/24  8:07 AM   Result Value Ref Range    POC Glucose 213 (H) 65 - 140 mg/dl   Fingerstick Glucose (POCT)    Collection Time: 06/06/24 10:53 AM   Result Value Ref Range    POC Glucose 215 (H) 65 - 140 mg/dl       Imaging: I have personally reviewed pertinent imaging in PACS, and I have personally  reviewed PACS reports.     EKG, Pathology, and Other Studies: I have personally reviewed pertinent reports.       VTE Prophylaxis: Sequential compression device (Venodyne) and Eliquis

## 2024-06-06 NOTE — PROGRESS NOTES
[facility] Service Attending Physician Supervision Note - Progress Note    I have seen and examined Melany Griffith personally and have reviewed the medical record independently.  I have reviewed all components of the note performed and documented by the resident physician.  I personally performed all the required components for patient evaluation and examined the patient.  I was the supervising / collaborating physician on 6/5/2024 in the morning .  I agree with the resident's findings and plan of care with the following additions / exceptions:    The patient has no major complaints today.  No new neurological changes.  Neurological exam is stable.  She denies any shortness of breath.  Lung exam is stable with lungs that are clear to auscultation bilaterally without any wheezing, cough, or rales.  Heart with regular rate and rhythm normal S1 and S2 heart sounds.  No murmurs.  The patient attempted to do MRI today but started climbing off the table and even with sedation was not able to tolerate doing the MRI.  Therefore, MRI procedure was aborted.  We discussed with neurology team who did agree that the patient would not require general anesthesia MRI to do this on an inpatient basis and that the patient could be discharged without the MRI procedure.  However, we are monitoring renal function as the creatinine did increase within the last 24 hours.  We held the diuretic this morning and provided her with 500 mL total IV fluids.  Nephrology wants to make sure we monitor the patient until tomorrow to assure stable renal function.    In terms of assessment and plan:  Ambulatory Dysfunction with Fall -   Neurology recommends MRI with and without contrast.    PT / OT recommending rehab setting after discharge, level 2 rehab intensity.  The patient is agreeable to discharge plan and case management working on this plan.  Hoping for discharge sometime tomorrow, 6/6/2024.  Fall precautions / bed alarm while in  hospital.   ADITYA superimposed on CKD III -   Present on Admission? - Yes  Baseline Creatinine - 0.9 - 1.1  Suspected Causes / Differential - Felt most likely to be cardiorenal syndrome.   IVF -normal saline given at 125 mL/h x 4 hours for a total of 500 mL today, 6/5/2024.  Urinalysis reviewed. Innumerable bacteria and large leukocytes with negative nitrites.   Monitor I/O.  BMP in am.  Imaging -   None.  But no significant suspicion for post renal etiology.   Avoid Hypotension -   BP management - See Hypertension below.  Monitor blood pressures.  Avoid Nephrotoxins and Adjust renally Excreted Medications -   Holding diuretic today, 6/5/2024.  Continue holding valsartan.   Nephrology following .  Hyponatremia -   Suspected to be possible SIADH most likely.   SSRI use prehospital.   Responded to tolvaptan x 2 given as daily doses on 6/1/2024 and 6/2/2024.   Monitor I/O  Monitor sodium levels on BMP.    SPEP / UPEP, serum free light chain negative.   Must also correct for any hyperglycemia.   Chronic Diastolic Heart Failure -   Diuretic - Torsemide 20 mg PO bid.  Beta Blocker - metoprolol.  Also on Sotalol.  ACEI / ARB - none currently.  Prehospital was taking valsartan.   Fluid restriction is 1800 ml/day.  BMP monitoring.   Diabetes mellitus type 2 (A1c 7.2%) -   Prehospital regimen -   Linagliptin (tradjenta) 5 mg PO daily.  Metformin 500 mg PO bid.  Inpatient regimen -   Insulin sliding scale.   Monitor blood sugars  Goal blood sugar in hospital 140 - 180.  Continue gabapentin.   Sacral Wound -  Wash Sacro Buttocks area with soap and water. Pat Dry. Apply Calazime to right buttocks TID and PRN.  Hydraguard to left buttock, b/l hips, b/l heels BID and PRN.  Elevate heels to offload pressure.  Ehob cushion when out of bed.  Turn/reposition q2h or when medically stable for pressure re-distribution on skin.  Moisturize skin daily with skin nourishing cream.  Seen by wound care team this admission.  Constipation -    Senna to 2 tabs daily.    Continue miralax 17g PO daily.   Use as minimal amount of opioid as possible.  Monitor BMs.   Metastatic Lung Adenocarcinoma -   S/p radiation and immunotherapy / chemo.  Currently on Sotorasib from home meds.   Outpatient follow up with Dr. Sam.   Hypertension -   Home regimen -   Valsartan 160 mg bid.  Sotalol 80 mg bid.  Metoprolol 25 mg bid.  Inpatient regimen -   Metoprolol and sotalol at home doses.   Holding valsartan due to renal concerns.   Currently also on torsemide.  Monitor blood pressures.     Education and Discussions with Family / Patient: Family updated at bedside during my rounds today.    I attest that this information is true, accurate, and complete to the best of my knowledge.    Shawn Ferrer, DO

## 2024-06-06 NOTE — ASSESSMENT & PLAN NOTE
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   Hyponatremia of 129; 131 corrected for hyperglycemia    Plan:  Nephrology following  Salt tablets 2 g x2  Trend BMP

## 2024-06-06 NOTE — ASSESSMENT & PLAN NOTE
- Continue fall precautions due to increased risk from sedation  - PT/OT evaluation recommending inpatient rehab - patient to be transferred to [facility] at discharge.   - Hold remeron as mentioned previously  - Reassess/monitor for any changes daily  - Continue inpatient OT/PT  - Follow up with Neurology.

## 2024-06-06 NOTE — PROGRESS NOTES
NEPHROLOGY HOSPITAL PROGRESS NOTE   Melany Griffith 73 y.o. female MRN: [id number]  Unit/Bed#: S -01 Encounter: [id number]  Reason for Consult: Hyponatremia    ASSESSMENT and PLAN:  23-year-old female with history of stage IV lung carcinoma status post chemoradiation and immunotherapy, paroxysmal A-fib, heart failure with reduced ejection fraction, diabetes mellitus type 2, CVA presented with mechanical fall.  We are consulted for management of hyponatremia.    1.  Hyponatremia.  Admission sodium 130.  Baseline serum sodium normal range.  Serum osmolality 286 (?  Isoosmolar).  Serum immunofixation no monoclonal immunoglobulin, UPEP no monoclonal band, serum free light chain ratio within normal limits.  Urine osmolality 162 (after tolvaptan dose x2).  Urine sodium less than 10.  Consider SIADH as the likely etiology (Effexor, BuSpar use).  Sotorasib can also cause hyponatremia.  Corrected 131-133 today.  Diuretics have been held due to rising creatinine.  Fluid restrict 1500 cc per 24 hours.  Give sodium chloride tablet 2 g x 2 today.  Trend BMP.    2.  ADITYA.  Admission creatinine 1.57.  Creatinine improved to 1.16 on 06/03.  She has a secondary rise in creatinine now at 1.57 today.  UA with 4-10 RBC.  Kidney ultrasound completed with results pending.  Etiology of initial rise in creatinine most likely cardiorenal syndrome.  Etiology of secondary rising creatinine most likely due to overdiuresis.  Diuretics remain on hold today.  No need for IV fluids.  Trend BMP.      3.  Bilateral lower extremity edema/fluid overload/chronic diastolic CHF.  Prior echocardiogram with EF 60%, G1 DD.  Status post IV Lasix 40 mg twice daily, transitioned to oral torsemide 20 mg twice daily on 06/03.  Leg swelling has significantly improved and agree with holding torsemide for now in setting of secondary rise in creatinine.    4.  Metabolic acidosis.  Serum bicarbonate level 24 today.  Status post discontinuation of sodium  bicarbonate on 06/04.  Continue to stay off sodium bicarbonate.    5.  Adenocarcinoma of the lung.  Status post chemo and immunotherapy.  Currently on sotorasib.    6.  Ambulatory dysfunction/fall.  Management per neurology and primary team.    Discussed with family medicine team.  After discussion, we agreed that creatinine continues to rise and may be in setting of recent diuresis and to hold off further diuretics due to significant improvement in leg swelling and to provide sodium chloride tablet for ongoing hyponatremia.    SUBJECTIVE / 24H INTERVAL HISTORY:  Denies dyspnea.  Leg swelling has significantly improved.    OBJECTIVE:  Current Weight: Weight - Scale: 68 kg (149 lb 14.6 oz)  Vitals:    06/05/24 2006 06/05/24 2146 06/05/24 2148 06/06/24 0442   BP: 111/66 114/79 114/79    Pulse: 76  70    Resp:  16 16    Temp:  98.2 °F (36.8 °C) 98.2 °F (36.8 °C)    TempSrc:       SpO2:   98%    Weight:    68 kg (149 lb 14.6 oz)       Intake/Output Summary (Last 24 hours) at 6/6/2024 0637  Last data filed at 6/6/2024 0035  Gross per 24 hour   Intake 360 ml   Output 109 ml   Net 251 ml     Review of Systems   Constitutional:  Negative for chills and fever.   HENT:  Negative for ear pain and sore throat.    Eyes:  Negative for pain and visual disturbance.   Respiratory:  Negative for cough and shortness of breath.    Cardiovascular:  Negative for chest pain and palpitations.   Gastrointestinal:  Negative for abdominal pain and vomiting.   Genitourinary:  Negative for dysuria and hematuria.   Musculoskeletal:  Negative for arthralgias and back pain.   Skin:  Negative for color change and rash.   Neurological:  Negative for seizures and syncope.   All other systems reviewed and are negative.    Physical Exam  Vitals and nursing note reviewed.   Constitutional:       General: She is not in acute distress.     Appearance: She is well-developed.   HENT:      Head: Normocephalic and atraumatic.   Eyes:      Conjunctiva/sclera:  Conjunctivae normal.   Cardiovascular:      Rate and Rhythm: Normal rate and regular rhythm.      Pulses: Normal pulses.      Heart sounds: Normal heart sounds. No murmur heard.  Pulmonary:      Effort: Pulmonary effort is normal. No respiratory distress.      Breath sounds: Normal breath sounds.   Abdominal:      Palpations: Abdomen is soft.      Tenderness: There is no abdominal tenderness.   Musculoskeletal:         General: No swelling.      Cervical back: Neck supple.      Right lower leg: No edema.      Left lower leg: No edema.      Comments: Bilateral leg swelling is significantly improved   Skin:     General: Skin is warm and dry.      Capillary Refill: Capillary refill takes less than 2 seconds.   Neurological:      Mental Status: She is alert.   Psychiatric:         Mood and Affect: Mood normal.       Medications:    Current Facility-Administered Medications:     acetaminophen (TYLENOL) tablet 975 mg, 975 mg, Oral, Q8H PRN, Rito Sheridan DO, 975 mg at 06/01/24 2355    apixaban (ELIQUIS) tablet 5 mg, 5 mg, Oral, BID, Rito Sheridan DO, 5 mg at 06/05/24 2006    aspirin (ECOTRIN LOW STRENGTH) EC tablet 81 mg, 81 mg, Oral, Daily, Rito Sheridan DO, 81 mg at 06/05/24 1008    atorvastatin (LIPITOR) tablet 40 mg, 40 mg, Oral, Daily, Rito Sheridan DO, 40 mg at 06/05/24 1008    busPIRone (BUSPAR) tablet 7.5 mg, 7.5 mg, Oral, HS, Rito Sheridan DO, 7.5 mg at 06/05/24 2148    cyanocobalamin (VITAMIN B-12) tablet 100 mcg, 100 mcg, Oral, Daily, Rito Sheridan DO, 100 mcg at 06/05/24 1007    folic acid (FOLVITE) tablet 1 mg, 1 mg, Oral, Daily, Rito Sheridan DO, 1 mg at 06/05/24 1007    gabapentin (NEURONTIN) capsule 200 mg, 200 mg, Oral, BID, Rito Sheridan DO, 200 mg at 06/05/24 1008    insulin lispro (HumALOG/ADMELOG) 100 units/mL subcutaneous injection 1-5 Units, 1-5 Units, Subcutaneous, TID AC, 1 Units at 06/05/24 1214 **AND** Fingerstick Glucose (POCT), , , TID AC, Rito Simes, DO    insulin lispro (HumALOG/ADMELOG) 100 units/mL  subcutaneous injection 1-5 Units, 1-5 Units, Subcutaneous, HS, Rito Sheridan DO, 1 Units at 06/05/24 2149    melatonin tablet 3 mg, 3 mg, Oral, HS, Marta Lazo MD, 3 mg at 06/05/24 2148    metoprolol tartrate (LOPRESSOR) tablet 50 mg, 50 mg, Oral, Q12H, Emmanuelle Velasquez MD, 50 mg at 06/05/24 2006    oxyCODONE (ROXICODONE) split tablet 2.5 mg, 2.5 mg, Oral, Q4H PRN, 2.5 mg at 06/06/24 0443 **OR** oxyCODONE (ROXICODONE) IR tablet 5 mg, 5 mg, Oral, Q4H PRN, Kyle Brunner, MD, 5 mg at 06/04/24 1538    pantoprazole (PROTONIX) EC tablet 40 mg, 40 mg, Oral, Early Morning, Rito Sheridan DO, 40 mg at 06/06/24 0443    polyethylene glycol (MIRALAX) packet 17 g, 17 g, Oral, Daily, Rito Sheridan DO, 17 g at 06/04/24 0809    prochlorperazine (COMPAZINE) tablet 10 mg, 10 mg, Oral, TID PRN, Rito Sheridan DO    senna (SENOKOT) tablet 17.2 mg, 2 tablet, Oral, Daily, Shawn Ferrer DO, 17.2 mg at 06/05/24 1008    sotalol (BETAPACE) tablet 80 mg, 80 mg, Oral, Q12H STEPHIE, Emmanuelle Velasquez MD, 80 mg at 06/05/24 2148    Sotorasib TABS 960 mg, 960 mg, Oral, Daily, Rito Sheridan DO, 960 mg at 06/04/24 0810    venlafaxine (EFFEXOR) tablet 100 mg, 100 mg, Oral, HS, Marta Lazo MD, 100 mg at 06/05/24 2149    Laboratory Results:  Results from last 7 days   Lab Units 06/06/24  0458 06/05/24  0503 06/04/24  0530 06/03/24  0536 06/02/24  2055 06/02/24  1352 06/02/24 0449 06/01/24  1259 06/01/24  0643 06/01/24  0610 05/31/24  0429   WBC Thousand/uL  --   --  12.45* 14.97*  --   --  13.75*  --  13.79*  --  14.51*   HEMOGLOBIN g/dL  --   --  9.1* 9.8*  --   --  9.2*  --  9.3*  --  8.8*   HEMATOCRIT %  --   --  31.9* 33.4*  --   --  31.6*  --  31.8*  --  30.7*   PLATELETS Thousands/uL  --   --  371 371  --   --  388  --  381  --  376   POTASSIUM mmol/L 3.6 3.5 3.8 3.8 4.8 4.2 3.5   < >  --    < > 3.6   CHLORIDE mmol/L 93* 92* 95* 96 95* 95* 94*   < >  --    < > 97   CO2 mmol/L 24 30 30 28 24 23 24   < >  --    < > 24  "  BUN mg/dL 34* 31* 25 24 27* 27* 27*   < >  --    < > 30*   CREATININE mg/dL 1.57* 1.46* 1.23 1.16 1.17 1.19 1.27   < >  --    < > 1.17   CALCIUM mg/dL 8.6 8.7 9.0 9.2 8.6 9.0 9.1   < >  --    < > 8.9   MAGNESIUM mg/dL  --   --   --   --   --   --   --   --   --   --  2.2    < > = values in this interval not displayed.       Portions of the record may have been created with voice recognition software. Occasional wrong word or \"sound a like\" substitutions may have occurred due to the inherent limitations of voice recognition software. Read the chart carefully and recognize, using context, where substitutions have occurred. If you have any questions, please contact the dictating provider.    "

## 2024-06-06 NOTE — ASSESSMENT & PLAN NOTE
- Continue fall precautions due to increased risk from sedation  - PT/OT evaluation recommending inpatient rehab - patient to be transferred to [facility] at discharge.   - Hold remeron as mentioned previously  - Reassess/monitor for any changes daily  - Continue inpatient OT/PT  - Follow up with Neurology

## 2024-06-06 NOTE — ASSESSMENT & PLAN NOTE
Malnutrition Findings:   Adult Malnutrition type: Chronic illness  Adult Degree of Malnutrition: Other severe protein calorie malnutrition  Malnutrition Characteristics: Weight loss, Inadequate energy, Muscle loss                  360 Statement: Chronic/severe malnutrition r/t decreased PO intake/condition, as evidenced by 9.5% wt loss x 3 months (5/21/24: 152#, 2/27/24: 168#),  intake meeting <75% of estimated needs for > 1 month, and severe muscle mass depletion (temporal region). Treatment: liberal PO diet + nutrition supplements    BMI Findings:           Body mass index is 26.77 kg/m².   Remeron 7.5 tonight for appetite, sleep and mood  D/c remeron given sedation. Consider restarting outpatient once renal function stable

## 2024-06-06 NOTE — ASSESSMENT & PLAN NOTE
Improving since admission   No evidence of acute CHF exacerbation at this time   Holding Torsemide - per Nephrology since her Cr elevated overnight.   No edema on exam

## 2024-06-06 NOTE — ASSESSMENT & PLAN NOTE
Improving since admission   No evidence of acute CHF exacerbation at this time   Holding Torsemide - per Nephrology since her Cr elevated overnight.   Reassess and monitor closely.

## 2024-06-06 NOTE — ASSESSMENT & PLAN NOTE
Lab Results   Component Value Date    EGFR 40 06/07/2024    EGFR 32 06/06/2024    EGFR 34 06/05/2024    CREATININE 1.30 06/07/2024    CREATININE 1.57 (H) 06/06/2024    CREATININE 1.46 (H) 06/05/2024     POA Cr 1.57   Baseline Cr 0.9-1.1  Suspicion of etiology being cardiorenal syndrome  Diuretics held; Creatinine stable    Plan:  Fluid restriction 2000 ml / day  Avoid hypotension and continue to monitor BP closely  Avoid Nephrotoxins and adjust renally excreted medications  Continue holding Valsartan - likely will hold after discharge and will notify PCP about discontinuation and adjustment of her current BP medication regimen  Nephrology following; stable per nephro  Hold diuretics; transition to torsemide 10 mg daily

## 2024-06-06 NOTE — ASSESSMENT & PLAN NOTE
Malnutrition Findings:   Adult Malnutrition type: Chronic illness  Adult Degree of Malnutrition: Other severe protein calorie malnutritionShe endorsed poor appetite and significant weight loss over the past 2 months, not completely sure how much. Nutrition consulted, input appreciated. Ensure ordered 10/2/HS.   Malnutrition Characteristics: Weight loss, Inadequate energy, Muscle loss                  360 Statement: Chronic/severe malnutrition r/t decreased PO intake/condition, as evidenced by 9.5% wt loss x 3 months (5/21/24: 152#, 2/27/24: 168#),  intake meeting <75% of estimated needs for > 1 month, and severe muscle mass depletion (temporal region). Treatment: liberal PO diet + nutrition supplements    BMI Findings:           Body mass index is 27.42 kg/m².

## 2024-06-06 NOTE — ASSESSMENT & PLAN NOTE
Lab Results   Component Value Date    EGFR 34 06/05/2024    EGFR 43 06/04/2024    EGFR 46 06/03/2024    CREATININE 1.46 (H) 06/05/2024    CREATININE 1.23 06/04/2024    CREATININE 1.16 06/03/2024     POA Cr 1.57   Baseline Cr 0.9-1.1  Elevated on today's lab after her Cr was slowly reducing over the past few days   Patient did not receive IV contrast during yesterday's attempt to obtain MRI   Nephrology following - recommended to hold diuretics (including home Valsartan)  Provided IVF bolus  Fluid restriction 1.5 L / day  Avoid hypotension and continue to monitor BP closely  Avoid Nephrotoxins and adjust renally excreted medications  Continue holding Valsartan - likely will hold after discharge and will notify PCP about discontinuation and adjustment of her current BP medication regimen  STOP Torsemide   Follow up AM BMP  Continue bladder scan/urinary retention protocol  Continue following Nephrology (appreciate for the recommendations).

## 2024-06-06 NOTE — ASSESSMENT & PLAN NOTE
Met SIRS with HR 90s and WBC >12. Suspect urinary source given UA on admission. Patient unreliable and unable to give symptomatic history.   Treated UTI with ceftriaxone; 3 day course completed on 6/2   S/p IV hydration, blood pressures supported without further fluids  Trend WBC daily and vitals per unit routine

## 2024-06-06 NOTE — ASSESSMENT & PLAN NOTE
Lab Results   Component Value Date    EGFR 32 06/06/2024    EGFR 34 06/05/2024    EGFR 43 06/04/2024    CREATININE 1.57 (H) 06/06/2024    CREATININE 1.46 (H) 06/05/2024    CREATININE 1.23 06/04/2024     POA Cr 1.57   Baseline Cr 0.9-1.1  Suspicion of etiology being cardiorenal syndrome  Torsemide was held 6/5 due to Cr increasing overnight    Plan:  Fluid restriction 1500 ml / day  Avoid hypotension and continue to monitor BP closely  Avoid Nephrotoxins and adjust renally excreted medications  Continue holding Valsartan - likely will hold after discharge and will notify PCP about discontinuation and adjustment of her current BP medication regimen  Continue bladder scan/urinary retention protocol  Follow up US kidney  Nephrology following  Hold diuretics

## 2024-06-06 NOTE — ASSESSMENT & PLAN NOTE
- S/p chemo, radiation, and immunotherapy. Follows w/ Dr. Sam outpatient. Currently on Sotorasib only.   - Patient's Sotorasib is running low and will need refill   - Discuss with Pharmacy for options during her inpatient stay.   - Continue monitoring for any changes  - Follow up with Hem/Onc/Dr. Sam as the patient mentioned he was going to see the patient this PM.   - Continue current regimen and adjust accordingly.

## 2024-06-06 NOTE — PLAN OF CARE
Problem: Potential for Falls  Goal: Patient will remain free of falls  Description: INTERVENTIONS:  - Educate patient/family on patient safety including physical limitations  - Instruct patient to call for assistance with activity   - Consult OT/PT to assist with strengthening/mobility   - Keep Call bell within reach  - Keep bed low and locked with side rails adjusted as appropriate  - Keep care items and personal belongings within reach  - Initiate and maintain comfort rounds  - Make Fall Risk Sign visible to staff  - Offer Toileting every 2 Hours, in advance of need  - Initiate/Maintain alarm  - Obtain necessary fall risk management equipment:   - Apply yellow socks and bracelet for high fall risk patients  - Consider moving patient to room near nurses station  Outcome: Progressing     Problem: Nutrition/Hydration-ADULT  Goal: Nutrient/Hydration intake appropriate for improving, restoring or maintaining nutritional needs  Description: Monitor and assess patient's nutrition/hydration status for malnutrition. Collaborate with interdisciplinary team and initiate plan and interventions as ordered.  Monitor patient's weight and dietary intake as ordered or per policy. Utilize nutrition screening tool and intervene as necessary. Determine patient's food preferences and provide high-protein, high-caloric foods as appropriate.     INTERVENTIONS:  - Monitor oral intake, urinary output, labs, and treatment plans  - Assess nutrition and hydration status and recommend course of action  - Evaluate amount of meals eaten  - Assist patient with eating if necessary   - Allow adequate time for meals  - Recommend/ encourage appropriate diets, oral nutritional supplements, and vitamin/mineral supplements  - Order, calculate, and assess calorie counts as needed  - Recommend, monitor, and adjust tube feedings and TPN/PPN based on assessed needs  - Assess need for intravenous fluids  - Provide specific nutrition/hydration education as  appropriate  - Include patient/family/caregiver in decisions related to nutrition  Outcome: Progressing     Problem: Prexisting or High Potential for Compromised Skin Integrity  Goal: Skin integrity is maintained or improved  Description: INTERVENTIONS:  - Identify patients at risk for skin breakdown  - Assess and monitor skin integrity  - Assess and monitor nutrition and hydration status  - Monitor labs   - Assess for incontinence   - Turn and reposition patient  - Assist with mobility/ambulation  - Relieve pressure over bony prominences  - Avoid friction and shearing  - Provide appropriate hygiene as needed including keeping skin clean and dry  - Evaluate need for skin moisturizer/barrier cream  - Collaborate with interdisciplinary team   - Patient/family teaching  - Consider wound care consult   Outcome: Progressing     Problem: PAIN - ADULT  Goal: Verbalizes/displays adequate comfort level or baseline comfort level  Description: Interventions:  - Encourage patient to monitor pain and request assistance  - Assess pain using appropriate pain scale  - Administer analgesics based on type and severity of pain and evaluate response  - Implement non-pharmacological measures as appropriate and evaluate response  - Consider cultural and social influences on pain and pain management  - Notify physician/advanced practitioner if interventions unsuccessful or patient reports new pain  Outcome: Progressing     Problem: INFECTION - ADULT  Goal: Absence or prevention of progression during hospitalization  Description: INTERVENTIONS:  - Assess and monitor for signs and symptoms of infection  - Monitor lab/diagnostic results  - Monitor all insertion sites, i.e. indwelling lines, tubes, and drains  - Monitor endotracheal if appropriate and nasal secretions for changes in amount and color  - [place] appropriate cooling/warming therapies per order  - Administer medications as ordered  - Instruct and encourage patient and family to  use good hand hygiene technique  - Identify and instruct in appropriate isolation precautions for identified infection/condition  Outcome: Progressing  Goal: Absence of fever/infection during neutropenic period  Description: INTERVENTIONS:  - Monitor WBC    Outcome: Progressing     Problem: SAFETY ADULT  Goal: Patient will remain free of falls  Description: INTERVENTIONS:  - Educate patient/family on patient safety including physical limitations  - Instruct patient to call for assistance with activity   - Consult OT/PT to assist with strengthening/mobility   - Keep Call bell within reach  - Keep bed low and locked with side rails adjusted as appropriate  - Keep care items and personal belongings within reach  - Initiate and maintain comfort rounds  - Make Fall Risk Sign visible to staff  - Offer Toileting every 2 Hours, in advance of need  - Initiate/Maintain alarm  - Obtain necessary fall risk management equipment:   - Apply yellow socks and bracelet for high fall risk patients  - Consider moving patient to room near nurses station  Outcome: Progressing  Goal: Maintain or return to baseline ADL function  Description: INTERVENTIONS:  -  Assess patient's ability to carry out ADLs; assess patient's baseline for ADL function and identify physical deficits which impact ability to perform ADLs (bathing, care of mouth/teeth, toileting, grooming, dressing, etc.)  - Assess/evaluate cause of self-care deficits   - Assess range of motion  - Assess patient's mobility; develop plan if impaired  - Assess patient's need for assistive devices and provide as appropriate  - Encourage maximum independence but intervene and supervise when necessary  - Involve family in performance of ADLs  - Assess for home care needs following discharge   - Consider OT consult to assist with ADL evaluation and planning for discharge  - Provide patient education as appropriate  Outcome: Progressing  Goal: Maintains/Returns to pre admission functional  level  Description: INTERVENTIONS:  - Perform AM-PAC 6 Click Basic Mobility/ Daily Activity assessment daily.  - Set and communicate daily mobility goal to care team and patient/family/caregiver.   - Collaborate with rehabilitation services on mobility goals if consulted  - Perform Range of Motion 2 times a day.  - Reposition patient every 2 hours.  - Dangle patient 2 times a day  - Stand patient 2 times a day  - Ambulate patient 2 times a day  - Out of bed to chair 2 times a day   - Out of bed for meals 2 times a day  - Out of bed for toileting  - Record patient progress and toleration of activity level   Outcome: Progressing     Problem: DISCHARGE PLANNING  Goal: Discharge to home or other facility with appropriate resources  Description: INTERVENTIONS:  - Identify barriers to discharge w/patient and caregiver  - Arrange for needed discharge resources and transportation as appropriate  - Identify discharge learning needs (meds, wound care, etc.)  - Arrange for interpretive services to assist at discharge as needed  - Refer to Case Management Department for coordinating discharge planning if the patient needs post-hospital services based on physician/advanced practitioner order or complex needs related to functional status, cognitive ability, or social support system  Outcome: Progressing     Problem: Knowledge Deficit  Goal: Patient/family/caregiver demonstrates understanding of disease process, treatment plan, medications, and discharge instructions  Description: Complete learning assessment and assess knowledge base.  Interventions:  - Provide teaching at level of understanding  - Provide teaching via preferred learning methods  Outcome: Progressing

## 2024-06-06 NOTE — ASSESSMENT & PLAN NOTE
Met SIRS with HR 90s and WBC >12. Suspect urinary source given UA on admission. Patient unreliable and unable to give symptomatic history.   Treated UTI with ceftriaxone; 3 day course completed on 6/2   S/p IV hydration, blood pressures supported without further fluids  Monitor off antibiotics

## 2024-06-06 NOTE — PROGRESS NOTES
Critical access hospital  Progress Note  Name: Melany Griffith I  MRN: [id number]  Unit/Bed#: S -01 I Date of Admission: 5/28/2024   Date of Service: 6/6/2024 I Hospital Day: 8    Assessment & Plan   * ADITYA on CKD  Assessment & Plan  Lab Results   Component Value Date    EGFR 32 06/06/2024    EGFR 34 06/05/2024    EGFR 43 06/04/2024    CREATININE 1.57 (H) 06/06/2024    CREATININE 1.46 (H) 06/05/2024    CREATININE 1.23 06/04/2024     POA Cr 1.57   Baseline Cr 0.9-1.1  Suspicion of etiology being cardiorenal syndrome  Torsemide was held 6/5 due to Cr increasing overnight    Plan:  Fluid restriction 2000 ml / day  Avoid hypotension and continue to monitor BP closely  Avoid Nephrotoxins and adjust renally excreted medications  Continue holding Valsartan - likely will hold after discharge and will notify PCP about discontinuation and adjustment of her current BP medication regimen  Continue bladder scan/urinary retention protocol  Follow up US kidney  Nephrology following  Hold diuretics    Hyponatremia  Assessment & Plan  Recent Labs     06/04/24  0530 06/05/24  0503 06/06/24  0458   SODIUM 134* 132* 129*         Likely 2/2 SIADH in the setting of Adenocarcinoma of the Lung - stage 4   Encouraged PO intake - but patient is also fluid restricted due to her CHF.   Hyponatremia of 129; 131 corrected for hyperglycemia    Plan:  Nephrology following  Salt tablets 2 g x2  Trend BMP    Lower extremity edema  Assessment & Plan  Improving since admission   No evidence of acute CHF exacerbation at this time   Holding Torsemide - per Nephrology since her Cr elevated overnight.   Reassess and monitor closely.     Sepsis (HCC)  Assessment & Plan  Met SIRS with HR 90s and WBC >12. Suspect urinary source given UA on admission. Patient unreliable and unable to give symptomatic history.   Treated UTI with ceftriaxone; 3 day course completed on 6/2   S/p IV hydration, blood pressures supported without further  fluids  Monitor off antibiotics    Ambulatory dysfunction  Assessment & Plan  - Continue fall precautions due to increased risk from sedation  - PT/OT evaluation recommending inpatient rehab - patient to be [facility] at discharge.   - Hold remeron as mentioned previously  - Reassess/monitor for any changes daily  - Continue inpatient OT/PT  - Follow up with Neurology    Chronic diastolic heart failure (HCC)  Assessment & Plan  Wt Readings from Last 3 Encounters:   06/06/24 68 kg (149 lb 14.6 oz)   05/21/24 69.3 kg (152 lb 12.8 oz)   05/20/24 71.4 kg (157 lb 8 oz)     Last Echo in 2022 showed EF 60, grade 1 diastolic dysfunction; home Lasix recently increased to 40 mg daily prn for swelling likely causing ADITYA.    Torsemide held 6/5 due to abrupt elevation in Cr overnight; lower Extremity Edema appears to be improving since admission; Nephrology following; patient denied any SOB/coughs  Lower extremity edema significantly improved    Plan:  Continue fluid restriction 2000 ml/day  Continue following Nephrology   Monitor Urine Output; I/O's  Diuretics held    Adenocarcinoma of lung, stage 4 (HCC)  Assessment & Plan  S/p chemo, radiation, and immunotherapy. Follows w/ Dr. Sam outpatient. Currently on Sotorasib only.   Patient's Sotorasib is running low and will need refill   Spoke with daughter Liz on 6/6 patient needs more Sotorasib while inpatient; family will be bringing more today  Follow-up with Heme/Onc Dr. Sma        Severe protein-calorie malnutrition (HCC)  Assessment & Plan  Malnutrition Findings:   Adult Malnutrition type: Chronic illness  Adult Degree of Malnutrition: Other severe protein calorie malnutrition  Malnutrition Characteristics: Weight loss, Inadequate energy, Muscle loss                  360 Statement: Chronic/severe malnutrition r/t decreased PO intake/condition, as evidenced by 9.5% wt loss x 3 months (5/21/24: 152#, 2/27/24: 168#),  intake meeting <75% of estimated needs for  > 1 month, and severe muscle mass depletion (temporal region). Treatment: liberal PO diet + nutrition supplements    BMI Findings:           Body mass index is 27.42 kg/m².   Remeron 7.5 tonight for appetite, sleep and mood  D/c remeron given sedation. Consider restarting outpatient once renal function stable    Acute pain of right shoulder  Assessment & Plan  S/p fall. Trauma XR's all negative. Tylenol 975 prn.     Fall  Assessment & Plan  S/p mechanical fall with no LOC. On eliquis. Sustained hit to back of the head and R shoulder. Trauma workup negative. Not currently in any pain.   Reported left hip and left knee pain; X-rays were ordered  Due to worsening neurologic function including imbalance and poor coordination of left side extremities CT head was repeated on 6/3 which was unremarkable and neurology was consulted    Plan  Tylenol prn for pain control  PT/OT recommending PAR, which patient agrees is a good idea  Fall precautions  Neurology consulted, recs appreciated:  MRI was unable to be tolerated by patient; after discussing with neurology, patient can be discharged without imaging and follow up outpatient    Constipation  Assessment & Plan  She notes ongoing issues with constipation at home. Miralax and Senna both ordered daily. Increase/reduce as needed.     Type 2 diabetes mellitus with stage 3 chronic kidney disease, without long-term current use of insulin, unspecified whether stage 3a or 3b CKD (HCC)  Assessment & Plan  Lab Results   Component Value Date    HGBA1C 7.2 (H) 05/08/2024       Recent Labs     06/05/24  1530 06/05/24  2031 06/06/24  0807 06/06/24  1053   POCGLU 160* 150* 213* 215*         Blood Sugar Average: Last 72 hrs:  (P) 188.8630517376863861    Hold home medications. SSI algorithm 1, titrate as needed.     Paroxysmal atrial fibrillation (HCC)  Assessment & Plan  Continue home eliquis, sotalol. Metoprolol tartrate increased to 50 BID for better HR contol.     Moderate  protein-calorie malnutrition (HCC)-resolved as of 2024  Assessment & Plan  Malnutrition Findings:   Adult Malnutrition type: Chronic illness  Adult Degree of Malnutrition: Other severe protein calorie malnutritionShe endorsed poor appetite and significant weight loss over the past 2 months, not completely sure how much. Nutrition consulted, input appreciated. Ensure ordered 10/2/HS.   Malnutrition Characteristics: Weight loss, Inadequate energy, Muscle loss                  360 Statement: Chronic/severe malnutrition r/t decreased PO intake/condition, as evidenced by 9.5% wt loss x 3 months (24: 152#, 24: 168#),  intake meeting <75% of estimated needs for > 1 month, and severe muscle mass depletion (temporal region). Treatment: liberal PO diet + nutrition supplements    BMI Findings:           Body mass index is 27.42 kg/m².              VTE Pharmacologic Prophylaxis: VTE Score: 7 High Risk (Score >/= 5) - Pharmacological DVT Prophylaxis Ordered: apixaban (Eliquis). Sequential Compression Devices Ordered.      Patient Centered Rounds: I performed bedside rounds with nursing staff today.  Discussions with Specialists or Other Care Team Provider: Nephrology    Education and Discussions with Family / Patient: Updated  (daughter) via phone.    Current Length of Stay: 8 day(s)  Current Patient Status: Inpatient   Discharge Plan: Anticipate discharge in 24-48 hrs to [facility]    Code Status: Level 3 - DNAR and DNI    Subjective:   Patient seen and evaluated at bedside. PCA currently on the room adjusting pulse oximetry. Patient is on room air. She denies any shortness of breath, chest pain, nausea, abdominal pain.  Reports voiding without any issues.  Patient states that she feels okay.  No overnight events.    Objective:     Vitals:   Temp (24hrs), Av.9 °F (36.6 °C), Min:97.2 °F (36.2 °C), Max:98.2 °F (36.8 °C)    Temp:  [97.2 °F (36.2 °C)-98.2 °F (36.8 °C)] 98.1 °F (36.7 °C)  HR:  [58-87]  58  Resp:  [14-18] 14  BP: (111-128)/(66-82) 125/79  SpO2:  [84 %-98 %] 97 %  Body mass index is 27.42 kg/m².     Input and Output Summary (last 24 hours):     Intake/Output Summary (Last 24 hours) at 6/6/2024 1329  Last data filed at 6/6/2024 0921  Gross per 24 hour   Intake 180 ml   Output 109 ml   Net 71 ml       Physical Exam:   Physical Exam  Constitutional:       Appearance: Normal appearance. She is not ill-appearing.   HENT:      Head: Normocephalic and atraumatic.   Cardiovascular:      Rate and Rhythm: Normal rate and regular rhythm.      Pulses: Normal pulses.      Heart sounds: Normal heart sounds.   Pulmonary:      Effort: Pulmonary effort is normal.      Breath sounds: Normal breath sounds.   Abdominal:      General: Bowel sounds are normal. There is no distension.      Palpations: Abdomen is soft.      Tenderness: There is no abdominal tenderness.   Musculoskeletal:      Right lower leg: Edema present.      Left lower leg: Edema present.   Skin:     General: Skin is warm.      Capillary Refill: Capillary refill takes less than 2 seconds.   Neurological:      Mental Status: She is alert.      Sensory: Sensation is intact.      Motor: Motor function is intact.      Coordination: Coordination is intact.      Comments: Oriented x 2  Pleasantly confused but easily redirected (baseline per daughter)          Additional Data:     Labs:  Results from last 7 days   Lab Units 06/04/24  0530 06/03/24  0536 06/01/24  0643 05/31/24  0429   WBC Thousand/uL 12.45* 14.97*   < > 14.51*   HEMOGLOBIN g/dL 9.1* 9.8*   < > 8.8*   HEMATOCRIT % 31.9* 33.4*   < > 30.7*   PLATELETS Thousands/uL 371 371   < > 376   BANDS PCT %  --   --   --  1   SEGS PCT %  --  89*   < >  --    LYMPHO PCT %  --  5*   < > 6*   MONO PCT %  --  5   < > 6   EOS PCT %  --  0   < > 0    < > = values in this interval not displayed.     Results from last 7 days   Lab Units 06/06/24  0458   SODIUM mmol/L 129*   POTASSIUM mmol/L 3.6   CHLORIDE mmol/L  93*   CO2 mmol/L 24   BUN mg/dL 34*   CREATININE mg/dL 1.57*   ANION GAP mmol/L 12   CALCIUM mg/dL 8.6   ALBUMIN g/dL 3.0*   TOTAL BILIRUBIN mg/dL 0.50   ALK PHOS U/L 280*   ALT U/L 16   AST U/L 20   GLUCOSE RANDOM mg/dL 250*         Results from last 7 days   Lab Units 06/06/24  1053 06/06/24  0807 06/05/24  2031 06/05/24  1530 06/05/24  1132 06/05/24  0727 06/04/24  2118 06/04/24  1718 06/04/24  1103 06/04/24  0839 06/03/24  2118 06/03/24  1613   POC GLUCOSE mg/dl 215* 213* 150* 160* 194* 160* 179* 234* 173* 201* 177* 170*               Lines/Drains:  Invasive Devices       Central Venous Catheter Line  Duration             Port A Cath 12/14/21 Right Chest 905 days              Peripheral Intravenous Line  Duration             Peripheral IV 06/02/24 Right Antecubital 3 days                    Central Line:  Goal for removal:  Chronic             Imaging: Reviewed radiology reports from this admission including: chest xray, CT head, and MRI spine    Recent Cultures (last 7 days):         Last 24 Hours Medication List:   Current Facility-Administered Medications   Medication Dose Route Frequency Provider Last Rate    acetaminophen  975 mg Oral Q8H PRN Rito Sheridan, DO      apixaban  5 mg Oral BID Rito Sheridan DO      aspirin  81 mg Oral Daily Rito Sheridan, DO      atorvastatin  40 mg Oral Daily Rito Sheridan, DO      busPIRone  7.5 mg Oral HS Rito Sheridan DO      cyanocobalamin  100 mcg Oral Daily Rito Sheridan, DO      folic acid  1 mg Oral Daily Rito Sheridan, DO      gabapentin  200 mg Oral BID Rito Sheridan, DO      insulin lispro  1-5 Units Subcutaneous TID AC Rito Sheridan DO      insulin lispro  1-5 Units Subcutaneous HS Rito Sheridan DO      melatonin  3 mg Oral HS Marta Lazo MD      metoprolol tartrate  50 mg Oral Q12H Emmanuelle Velasquez MD      oxyCODONE  2.5 mg Oral Q4H PRN Kyle Brunner, MD      Or    oxyCODONE  5 mg Oral Q4H PRN Kyle Brunner, MD      pantoprazole  40 mg Oral Early Morning Rito Sheridan DO       polyethylene glycol  17 g Oral Daily Rito Sheridan DO      prochlorperazine  10 mg Oral TID PRN Rito Sheridan DO      senna  2 tablet Oral Daily Shawn Ferrer,       sodium chloride  2 g Oral BID With Meals Shaun Banegas MD      sotalol  80 mg Oral Q12H [facility] Emmanuelle Velasuqez MD      Sotorasib  960 mg Oral Daily Rito Sheridan DO      venlafaxine  100 mg Oral HS Marta Lazo MD          Today, Patient Was Seen By: Jeana Edwards MD    **Please Note: This note may have been constructed using a voice recognition system.**

## 2024-06-06 NOTE — ASSESSMENT & PLAN NOTE
Lab Results   Component Value Date    HGBA1C 7.2 (H) 05/08/2024       Recent Labs     06/06/24  1557 06/06/24  2125 06/07/24  0723 06/07/24  1145   POCGLU 163* 175* 173* 231*       Blood Sugar Average: Last 72 hrs:  (P) [id number]    Hold home medications. SSI algorithm 1, titrate as needed.

## 2024-06-06 NOTE — ASSESSMENT & PLAN NOTE
S/p chemo, radiation, and immunotherapy. Follows w/ Dr. Sam outpatient. Currently on Sotorasib only.   Patient's Sotorasib is running low and will need refill   Spoke with daughter Liz on 6/6 patient needs more Sotorasib while inpatient; family will be bringing more today  Follow-up with Heme/Onc Dr. Sam

## 2024-06-06 NOTE — ASSESSMENT & PLAN NOTE
- Improving since admission   - No evidence of acute CHF exacerbation at this time   - Holding Torsemide - per Nephrology since her Cr elevated overnight.   - Continue Compression stalking and other measures to avoid re-exacerbating her chronic condition  - Reassess and monitor closely.

## 2024-06-06 NOTE — ASSESSMENT & PLAN NOTE
Wt Readings from Last 3 Encounters:   06/05/24 66.4 kg (146 lb 6.2 oz)   05/21/24 69.3 kg (152 lb 12.8 oz)   05/20/24 71.4 kg (157 lb 8 oz)     Last Echo in 2022 showed EF 60, grade 1 diastolic dysfunction; home Lasix recently increased to 40 mg daily prn for swelling likely causing ADITYA.    Holding torsemide due to abrupt elevation in Cr overnight; lower Extremity Edema appears to be improving since admission; Nephrology following; patient denied any SOB/coughs, worsening b/l LE edema.   Continue monitoring for fluid overload and continue fluid restriction as mentioned above.   Follow up AM labs  Continue following Nephrology   Monitor Urine Output; I/O's  Monitor vitals  Reassess in AM

## 2024-06-06 NOTE — ASSESSMENT & PLAN NOTE
S/p mechanical fall with no LOC. On eliquis. Sustained hit to back of the head and R shoulder. Trauma workup negative. Not currently in any pain.   Reported left hip and left knee pain; X-rays were ordered  Due to worsening neurologic function including imbalance and poor coordination of left side extremities CT head was repeated on 6/3 which was unremarkable and neurology was consulted    Plan  Tylenol prn for pain control  PT/OT recommending PAR, which patient agrees is a good idea  Fall precautions  Neurology consulted, recs appreciated:  MRI was unable to be tolerated by patient; after discussing with neurology, patient can be discharged without imaging and follow up outpatient

## 2024-06-06 NOTE — ASSESSMENT & PLAN NOTE
S/p mechanical fall with no LOC. On eliquis. Sustained hit to back of the head and R shoulder. Trauma workup negative. Not currently in any pain.   Reported left hip and left knee pain; X-rays were ordered  Due to worsening neurologic function including imbalance and poor coordination of left side extremities CT head was repeated on 6/3 which was unremarkable and neurology was consulted    Plan  Tylenol prn for pain control  PT/OT recommending PAR, which patient agrees is a good idea  Fall precautions  Neurology consulted, recs appreciated:  MRI was unable to be tolerated by patient; after discussing with neurology, patient can be discharged without imaging and follow up outpatient in 6 weeks

## 2024-06-06 NOTE — ASSESSMENT & PLAN NOTE
Wt Readings from Last 3 Encounters:   06/06/24 68 kg (149 lb 14.6 oz)   05/21/24 69.3 kg (152 lb 12.8 oz)   05/20/24 71.4 kg (157 lb 8 oz)     Last Echo in 2022 showed EF 60, grade 1 diastolic dysfunction; home Lasix recently increased to 40 mg daily prn for swelling likely causing ADITYA.    Torsemide held 6/5 due to abrupt elevation in Cr overnight; lower Extremity Edema appears to be improving since admission; Nephrology following; patient denied any SOB/coughs  Lower extremity edema significantly improved    Plan:  Continue fluid restriction 1500 ml/day  Continue following Nephrology   Monitor Urine Output; I/O's  Diuretics held

## 2024-06-07 PROBLEM — Z71.89 GOALS OF CARE, COUNSELING/DISCUSSION: Status: ACTIVE | Noted: 2018-11-27

## 2024-06-07 PROBLEM — A41.9 SEPSIS (HCC): Status: RESOLVED | Noted: 2024-05-31 | Resolved: 2024-06-07

## 2024-06-07 PROBLEM — Z51.5 PALLIATIVE CARE ENCOUNTER: Status: ACTIVE | Noted: 2024-06-07

## 2024-06-07 LAB
ALBUMIN SERPL BCP-MCNC: 2.7 G/DL (ref 3.5–5)
ALP SERPL-CCNC: 323 U/L (ref 34–104)
ALT SERPL W P-5'-P-CCNC: 20 U/L (ref 7–52)
ANION GAP SERPL CALCULATED.3IONS-SCNC: 8 MMOL/L (ref 4–13)
AST SERPL W P-5'-P-CCNC: 24 U/L (ref 13–39)
BILIRUB SERPL-MCNC: 0.64 MG/DL (ref 0.2–1)
BUN SERPL-MCNC: 32 MG/DL (ref 5–25)
CALCIUM ALBUM COR SERPL-MCNC: 9.9 MG/DL (ref 8.3–10.1)
CALCIUM SERPL-MCNC: 8.9 MG/DL (ref 8.4–10.2)
CHLORIDE SERPL-SCNC: 94 MMOL/L (ref 96–108)
CO2 SERPL-SCNC: 30 MMOL/L (ref 21–32)
CREAT SERPL-MCNC: 1.3 MG/DL (ref 0.6–1.3)
GFR SERPL CREATININE-BSD FRML MDRD: 40 ML/MIN/1.73SQ M
GLUCOSE SERPL-MCNC: 170 MG/DL (ref 65–140)
GLUCOSE SERPL-MCNC: 173 MG/DL (ref 65–140)
GLUCOSE SERPL-MCNC: 199 MG/DL (ref 65–140)
GLUCOSE SERPL-MCNC: 231 MG/DL (ref 65–140)
GLUCOSE SERPL-MCNC: 245 MG/DL (ref 65–140)
POTASSIUM SERPL-SCNC: 3.5 MMOL/L (ref 3.5–5.3)
PROT SERPL-MCNC: 5.8 G/DL (ref 6.4–8.4)
SARS-COV-2 RNA RESP QL NAA+PROBE: NEGATIVE
SODIUM SERPL-SCNC: 132 MMOL/L (ref 135–147)
VIT B12 SERPL-MCNC: 1918 PG/ML (ref 180–914)

## 2024-06-07 PROCEDURE — 99232 SBSQ HOSP IP/OBS MODERATE 35: CPT | Performed by: INTERNAL MEDICINE

## 2024-06-07 PROCEDURE — 80053 COMPREHEN METABOLIC PANEL: CPT

## 2024-06-07 PROCEDURE — 87635 SARS-COV-2 COVID-19 AMP PRB: CPT

## 2024-06-07 PROCEDURE — 97535 SELF CARE MNGMENT TRAINING: CPT

## 2024-06-07 PROCEDURE — 82948 REAGENT STRIP/BLOOD GLUCOSE: CPT

## 2024-06-07 PROCEDURE — G0316 PR PROLONG INPT EVAL ADD15 M: HCPCS | Performed by: NURSE PRACTITIONER

## 2024-06-07 PROCEDURE — 99223 1ST HOSP IP/OBS HIGH 75: CPT | Performed by: NURSE PRACTITIONER

## 2024-06-07 RX ORDER — SODIUM CHLORIDE 1 G/1
1 TABLET ORAL 2 TIMES DAILY WITH MEALS
Status: COMPLETED | OUTPATIENT
Start: 2024-06-07 | End: 2024-06-07

## 2024-06-07 RX ORDER — LIDOCAINE 40 MG/G
CREAM TOPICAL 4 TIMES DAILY
Status: DISCONTINUED | OUTPATIENT
Start: 2024-06-07 | End: 2024-06-08 | Stop reason: HOSPADM

## 2024-06-07 RX ORDER — AMOXICILLIN 250 MG
2 CAPSULE ORAL
Status: DISCONTINUED | OUTPATIENT
Start: 2024-06-07 | End: 2024-06-08 | Stop reason: HOSPADM

## 2024-06-07 RX ADMIN — GABAPENTIN 200 MG: 100 CAPSULE ORAL at 09:43

## 2024-06-07 RX ADMIN — SOTALOL HYDROCHLORIDE 80 MG: 80 TABLET ORAL at 21:12

## 2024-06-07 RX ADMIN — SOTALOL HYDROCHLORIDE 80 MG: 80 TABLET ORAL at 09:42

## 2024-06-07 RX ADMIN — APIXABAN 5 MG: 5 TABLET, FILM COATED ORAL at 16:45

## 2024-06-07 RX ADMIN — SODIUM CHLORIDE 1 G: 1 TABLET ORAL at 09:42

## 2024-06-07 RX ADMIN — FOLIC ACID 1 MG: 1 TABLET ORAL at 09:42

## 2024-06-07 RX ADMIN — INSULIN LISPRO 2 UNITS: 100 INJECTION, SOLUTION INTRAVENOUS; SUBCUTANEOUS at 12:25

## 2024-06-07 RX ADMIN — PANTOPRAZOLE SODIUM 40 MG: 40 TABLET, DELAYED RELEASE ORAL at 05:45

## 2024-06-07 RX ADMIN — VENLAFAXINE 100 MG: 25 TABLET ORAL at 21:26

## 2024-06-07 RX ADMIN — METOPROLOL TARTRATE 50 MG: 50 TABLET, FILM COATED ORAL at 09:42

## 2024-06-07 RX ADMIN — SENNOSIDES 17.2 MG: 8.6 TABLET, FILM COATED ORAL at 09:42

## 2024-06-07 RX ADMIN — ATORVASTATIN CALCIUM 40 MG: 40 TABLET, FILM COATED ORAL at 09:42

## 2024-06-07 RX ADMIN — INSULIN LISPRO 2 UNITS: 100 INJECTION, SOLUTION INTRAVENOUS; SUBCUTANEOUS at 16:46

## 2024-06-07 RX ADMIN — INSULIN LISPRO 1 UNITS: 100 INJECTION, SOLUTION INTRAVENOUS; SUBCUTANEOUS at 21:15

## 2024-06-07 RX ADMIN — SODIUM CHLORIDE 1 G: 1 TABLET ORAL at 16:46

## 2024-06-07 RX ADMIN — METOPROLOL TARTRATE 50 MG: 50 TABLET, FILM COATED ORAL at 18:50

## 2024-06-07 RX ADMIN — ASPIRIN 81 MG: 81 TABLET, COATED ORAL at 09:42

## 2024-06-07 RX ADMIN — APIXABAN 5 MG: 5 TABLET, FILM COATED ORAL at 09:42

## 2024-06-07 RX ADMIN — SENNOSIDES, DOCUSATE SODIUM 2 TABLET: 8.6; 5 TABLET ORAL at 21:12

## 2024-06-07 RX ADMIN — GABAPENTIN 200 MG: 100 CAPSULE ORAL at 16:45

## 2024-06-07 RX ADMIN — BUSPIRONE HYDROCHLORIDE 7.5 MG: 5 TABLET ORAL at 21:12

## 2024-06-07 RX ADMIN — SOTORASIB 960 MG: 120 TABLET, COATED ORAL at 10:07

## 2024-06-07 RX ADMIN — VITAM B12 100 MCG: 100 TAB at 09:42

## 2024-06-07 RX ADMIN — Medication 3 MG: at 21:12

## 2024-06-07 RX ADMIN — POLYETHYLENE GLYCOL 3350 17 G: 17 POWDER, FOR SOLUTION ORAL at 09:41

## 2024-06-07 NOTE — ASSESSMENT & PLAN NOTE
Lab Results   Component Value Date    EGFR 43 06/08/2024    EGFR 40 06/07/2024    EGFR 32 06/06/2024    CREATININE 1.23 06/08/2024    CREATININE 1.30 06/07/2024    CREATININE 1.57 (H) 06/06/2024     POA Cr 1.57   Baseline Cr 0.9-1.1  Nephrology consulted and mentioned suspicion for etiology being cardiorenal syndrome  Diuretics were held; Creatinine stable  US kidney unremarkable; no hydronephrosis.   Patient's Cr lowered again overnight, moving closer to his baseline.   Patient's dry weight today at:155.4 lbs.   Patient was deemed stable for discharge and instructions were provided on when to start Torsemide.    Patient was amenable to plan and will follow up with her PCP within 1-week from discharge date.   Lastly, the patient's Valsartan was discontinued due to her renal disease.      Plan:  Continue fluid restriction 1800 ml / day  Avoid hypotension and continue to monitor BP closely  Avoid Nephrotoxins and adjust renally excreted medications  Discontinue Valsartan.   Follow up with PCP within 1-week from discharge date.   Patient stable for discharge to [facility].

## 2024-06-07 NOTE — PLAN OF CARE
Problem: Nutrition/Hydration-ADULT  Goal: Nutrient/Hydration intake appropriate for improving, restoring or maintaining nutritional needs  Description: Monitor and assess patient's nutrition/hydration status for malnutrition. Collaborate with interdisciplinary team and initiate plan and interventions as ordered.  Monitor patient's weight and dietary intake as ordered or per policy. Utilize nutrition screening tool and intervene as necessary. Determine patient's food preferences and provide high-protein, high-caloric foods as appropriate.     INTERVENTIONS:  - Monitor oral intake, urinary output, labs, and treatment plans  - Assess nutrition and hydration status and recommend course of action  - Evaluate amount of meals eaten  - Assist patient with eating if necessary   - Allow adequate time for meals  - Recommend/ encourage appropriate diets, oral nutritional supplements, and vitamin/mineral supplements  - Order, calculate, and assess calorie counts as needed  - Recommend, monitor, and adjust tube feedings and TPN/PPN based on assessed needs  - Assess need for intravenous fluids  - Provide specific nutrition/hydration education as appropriate  - Include patient/family/caregiver in decisions related to nutrition  Outcome: Progressing     Problem: PAIN - ADULT  Goal: Verbalizes/displays adequate comfort level or baseline comfort level  Description: Interventions:  - Encourage patient to monitor pain and request assistance  - Assess pain using appropriate pain scale  - Administer analgesics based on type and severity of pain and evaluate response  - Implement non-pharmacological measures as appropriate and evaluate response  - Consider cultural and social influences on pain and pain management  - Notify physician/advanced practitioner if interventions unsuccessful or patient reports new pain  Outcome: Progressing     Problem: INFECTION - ADULT  Goal: Absence or prevention of progression during  hospitalization  Description: INTERVENTIONS:  - Assess and monitor for signs and symptoms of infection  - Monitor lab/diagnostic results  - Monitor all insertion sites, i.e. indwelling lines, tubes, and drains  - Monitor endotracheal if appropriate and nasal secretions for changes in amount and color  - [place] appropriate cooling/warming therapies per order  - Administer medications as ordered  - Instruct and encourage patient and family to use good hand hygiene technique  - Identify and instruct in appropriate isolation precautions for identified infection/condition  Outcome: Progressing  Goal: Absence of fever/infection during neutropenic period  Description: INTERVENTIONS:  - Monitor WBC    Outcome: Progressing     Problem: DISCHARGE PLANNING  Goal: Discharge to home or other facility with appropriate resources  Description: INTERVENTIONS:  - Identify barriers to discharge w/patient and caregiver  - Arrange for needed discharge resources and transportation as appropriate  - Identify discharge learning needs (meds, wound care, etc.)  - Arrange for interpretive services to assist at discharge as needed  - Refer to Case Management Department for coordinating discharge planning if the patient needs post-hospital services based on physician/advanced practitioner order or complex needs related to functional status, cognitive ability, or social support system  Outcome: Progressing     Problem: Knowledge Deficit  Goal: Patient/family/caregiver demonstrates understanding of disease process, treatment plan, medications, and discharge instructions  Description: Complete learning assessment and assess knowledge base.  Interventions:  - Provide teaching at level of understanding  - Provide teaching via preferred learning methods  Outcome: Progressing

## 2024-06-07 NOTE — ASSESSMENT & PLAN NOTE
Malnutrition Findings:   Adult Malnutrition type: Chronic illness  Adult Degree of Malnutrition: Other severe protein calorie malnutrition  Malnutrition Characteristics: Weight loss, Inadequate energy, Muscle loss                  360 Statement: Chronic/severe malnutrition r/t decreased PO intake/condition, as evidenced by 9.5% wt loss x 3 months (5/21/24: 152#, 2/27/24: 168#),  intake meeting <75% of estimated needs for > 1 month, and severe muscle mass depletion (temporal region). Treatment: liberal PO diet + nutrition supplements    BMI Findings:           Body mass index is 27.7 kg/m².   Remeron 7.5 tonight for appetite, sleep and mood  D/c remeron given sedation. Consider restarting outpatient once renal function stable

## 2024-06-07 NOTE — ASSESSMENT & PLAN NOTE
- Patient's PT/OT evaluation recommended [facility] facility at discharge.   - Patient still requiring assistance with mobility   - Discussed with the patient about what to expect at facility.  - Patient stable for discharge to rehab facility .

## 2024-06-07 NOTE — ASSESSMENT & PLAN NOTE
Wt Readings from Last 3 Encounters:   06/07/24 68.7 kg (151 lb 7.3 oz)   05/21/24 69.3 kg (152 lb 12.8 oz)   05/20/24 71.4 kg (157 lb 8 oz)     Last Echo in 2022 showed EF 60, grade 1 diastolic dysfunction; home Lasix recently increased to 40 mg daily prn for swelling likely causing ADITYA.    Torsemide held 6/5 due to abrupt elevation in Cr overnight; lower Extremity Edema appears to be improving since admission; Nephrology following; patient denied any SOB/coughs  Lower extremity edema significantly improved    Plan:  Continue fluid restriction 2000 ml/day  Monitor Urine Output; I/O's  Torsemide instructions provided on when to use as currently nephrology recommending PRN and not to take QD.

## 2024-06-07 NOTE — ASSESSMENT & PLAN NOTE
Small wound present in the R sacral/buttock area. Small amount of scabbing present, no current discharge. Low suspicion of infection.     Plan  Wound care consulted - instructions provided in discharge packet/instructions.   Stable for discharge.

## 2024-06-07 NOTE — ASSESSMENT & PLAN NOTE
Bowel regimen increased today   Continue miralax daily   Start Senna S 2 @ hs   Start suppository once daily PRN

## 2024-06-07 NOTE — ASSESSMENT & PLAN NOTE
Malnutrition Findings:   Adult Malnutrition type: Chronic illness  Adult Degree of Malnutrition: Other severe protein calorie malnutrition  Malnutrition Characteristics: Weight loss, Inadequate energy, Muscle loss              PO PO intake  Not drinking ensure, switch to ensure clear today  Family to focus on assisting patient with meal intake when visiting at rehab        360 Statement: Chronic/severe malnutrition r/t decreased PO intake/condition, as evidenced by 9.5% wt loss x 3 months (5/21/24: 152#, 2/27/24: 168#),  intake meeting <75% of estimated needs for > 1 month, and severe muscle mass depletion (temporal region). Treatment: liberal PO diet + nutrition supplements    BMI Findings:           Body mass index is 27.7 kg/m².

## 2024-06-07 NOTE — PROGRESS NOTES
[facility]  Progress Note  Name: Melany Griffiht I  MRN: [id number]  Unit/Bed#: S -01 I Date of Admission: 5/28/2024   Date of Service: 6/7/2024 I Hospital Day: 9    Assessment & Plan   * ADITYA on CKD  Assessment & Plan  Lab Results   Component Value Date    EGFR 40 06/07/2024    EGFR 32 06/06/2024    EGFR 34 06/05/2024    CREATININE 1.30 06/07/2024    CREATININE 1.57 (H) 06/06/2024    CREATININE 1.46 (H) 06/05/2024     POA Cr 1.57   Baseline Cr 0.9-1.1  Suspicion of etiology being cardiorenal syndrome  Diuretics held; Creatinine stable  US kidney unremarkable; no indication of obstruction    Plan:  Fluid restriction 2000 ml / day  Avoid hypotension and continue to monitor BP closely  Avoid Nephrotoxins and adjust renally excreted medications  Continue holding Valsartan - likely will hold after discharge and will notify PCP about discontinuation and adjustment of her current BP medication regimen  Nephrology following; stable per nephro  Hold diuretics; transition to torsemide 10 mg daily on discharge    Hyponatremia  Assessment & Plan  Recent Labs     06/05/24  0503 06/06/24  0458 06/07/24  0544   SODIUM 132* 129* 132*         Likely 2/2 SIADH in the setting of Adenocarcinoma of the Lung - stage 4   Encouraged PO intake - but patient is also fluid restricted due to her CHF.   Hyponatremia stable at 132    Plan:  Nephrology following; cleared by nephrology  Salt tablets 1 g x2     Lower extremity edema  Assessment & Plan  Improving since admission   No evidence of acute CHF exacerbation at this time   Holding Torsemide - per Nephrology since her Cr elevated overnight.   No edema on exam    Sepsis (HCC)  Assessment & Plan  Met SIRS with HR 90s and WBC >12. Suspect urinary source given UA on admission. Patient unreliable and unable to give symptomatic history.   Treated UTI with ceftriaxone; 3 day course completed on 6/2   S/p IV hydration, blood pressures supported without  further fluids  Monitor off antibiotics    Ambulatory dysfunction  Assessment & Plan  - Continue fall precautions due to increased risk from sedation  - PT/OT evaluation recommending inpatient rehab - patient to be transferred to [facility] at discharge.   - Hold remeron as mentioned previously  - Reassess/monitor for any changes daily  - Continue inpatient OT/PT  - Follow up with Neurology    Chronic diastolic heart failure (HCC)  Assessment & Plan  Wt Readings from Last 3 Encounters:   06/07/24 68.7 kg (151 lb 7.3 oz)   05/21/24 69.3 kg (152 lb 12.8 oz)   05/20/24 71.4 kg (157 lb 8 oz)     Last Echo in 2022 showed EF 60, grade 1 diastolic dysfunction; home Lasix recently increased to 40 mg daily prn for swelling likely causing ADITYA.    Torsemide held 6/5 due to abrupt elevation in Cr overnight; lower Extremity Edema appears to be improving since admission; Nephrology following; patient denied any SOB/coughs  Lower extremity edema significantly improved    Plan:  Continue fluid restriction 2000 ml/day  Monitor Urine Output; I/O's  Diuretics held; transition to oral torsemide 10 mg after discharge    Adenocarcinoma of lung, stage 4 (HCC)  Assessment & Plan  S/p chemo, radiation, and immunotherapy. Follows w/ Dr. Sam outpatient. Currently on Sotorasib only.   Patient's Sotorasib is running low and will need refill   Spoke with daughter Liz on 6/6 patient needs more Sotorasib while inpatient; family will be bringing more today  Follow-up with Heme/Onc Dr. Sam  Palliative consulted for goals of care discussion        Severe protein-calorie malnutrition (HCC)  Assessment & Plan  Malnutrition Findings:   Adult Malnutrition type: Chronic illness  Adult Degree of Malnutrition: Other severe protein calorie malnutrition  Malnutrition Characteristics: Weight loss, Inadequate energy, Muscle loss                  360 Statement: Chronic/severe malnutrition r/t decreased PO intake/condition, as evidenced by 9.5% wt loss x 3  months (5/21/24: 152#, 2/27/24: 168#),  intake meeting <75% of estimated needs for > 1 month, and severe muscle mass depletion (temporal region). Treatment: liberal PO diet + nutrition supplements    BMI Findings:           Body mass index is 27.7 kg/m².   Remeron 7.5 tonight for appetite, sleep and mood  D/c remeron given sedation. Consider restarting outpatient once renal function stable  Palliative consulted      Sacral wound  Assessment & Plan  Small wound present in the R sacral/buttock area. Small amount of scabbing present, no current discharge. Low suspicion of infection.     Plan  Wound care consult  No need for abx at this time    Acute pain of right shoulder  Assessment & Plan  S/p fall. Trauma XR's all negative. Tylenol 975 prn.     Fall  Assessment & Plan  S/p mechanical fall with no LOC. On eliquis. Sustained hit to back of the head and R shoulder. Trauma workup negative. Not currently in any pain.   Reported left hip and left knee pain; X-rays were ordered  Due to worsening neurologic function including imbalance and poor coordination of left side extremities CT head was repeated on 6/3 which was unremarkable and neurology was consulted    Plan  Tylenol prn for pain control  PT/OT recommending PAR, which patient agrees is a good idea  Fall precautions  Neurology consulted, recs appreciated:  MRI was unable to be tolerated by patient; after discussing with neurology, patient can be discharged without imaging and follow up outpatient in 6 weeks    Constipation  Assessment & Plan  She notes ongoing issues with constipation at home. Miralax and Senna both ordered daily. Increase/reduce as needed.     Type 2 diabetes mellitus with stage 3 chronic kidney disease, without long-term current use of insulin, unspecified whether stage 3a or 3b CKD (HCC)  Assessment & Plan  Lab Results   Component Value Date    HGBA1C 7.2 (H) 05/08/2024       Recent Labs     06/06/24  1557 06/06/24  2125 06/07/24  0723  24  1145   POCGLU 163* 175* 173* 231*         Blood Sugar Average: Last 72 hrs:  (P) [id number]    Hold home medications. SSI algorithm 1, titrate as needed.     Paroxysmal atrial fibrillation (HCC)  Assessment & Plan  Continue home eliquis, sotalol. Metoprolol tartrate increased to 50 BID for better HR contol.              VTE Pharmacologic Prophylaxis: VTE Score: 7 High Risk (Score >/= 5) - Pharmacological DVT Prophylaxis Ordered: apixaban (Eliquis). Sequential Compression Devices Ordered.    Mobility:   Basic Mobility Inpatient Raw Score: 11  JH-HLM Goal: 4: Move to chair/commode  JH-HLM Achieved: 3: Sit at edge of bed  JH-HLM Goal NOT achieved. Continue with multidisciplinary rounding and encourage appropriate mobility to improve upon JH-HLM goals.    Patient Centered Rounds: I performed bedside rounds with nursing staff today.  Discussions with Specialists or Other Care Team Provider: Nephrology    Education and Discussions with Family / Patient: Updated  (daughter) via phone.    Current Length of Stay: 9 day(s)  Current Patient Status: Inpatient   Discharge Plan: Anticipate discharge later today or tomorrow to [facility] ; pending goals of care discussion    Code Status: Level 3 - DNAR and DNI    Subjective:   Patient seen and evaluated at bedside.  Patient reports doing well.  Denies shortness of breath, chest pain, abdominal pain, nausea, vomiting.  RN reports patient stays awake overnight and sleeps during the day.  She also reports low appetite.    Objective:     Vitals:   Temp (24hrs), Av.5 °F (36.4 °C), Min:97.4 °F (36.3 °C), Max:97.6 °F (36.4 °C)    Temp:  [97.4 °F (36.3 °C)-97.6 °F (36.4 °C)] 97.4 °F (36.3 °C)  HR:  [50-79] 67  Resp:  [14-16] 14  BP: (125-129)/(60-73) 126/60  SpO2:  [96 %-100 %] 100 %  Body mass index is 27.7 kg/m².     Input and Output Summary (last 24 hours):     Intake/Output Summary (Last 24 hours) at 2024 1434  Last data filed at 2024 1342  Gross  per 24 hour   Intake 480 ml   Output 846 ml   Net -366 ml       Physical Exam:   Physical Exam  Constitutional:       General: She is not in acute distress.     Appearance: Normal appearance. She is not ill-appearing.   Cardiovascular:      Rate and Rhythm: Normal rate and regular rhythm.      Pulses: Normal pulses.      Heart sounds: Normal heart sounds.   Pulmonary:      Effort: Pulmonary effort is normal.      Breath sounds: Normal breath sounds.   Abdominal:      General: Abdomen is flat. Bowel sounds are normal. There is no distension.      Palpations: Abdomen is soft.      Tenderness: There is no abdominal tenderness. There is no rebound.   Musculoskeletal:      Right lower leg: No edema.      Left lower leg: No edema.   Skin:     Capillary Refill: Capillary refill takes less than 2 seconds.   Neurological:      General: No focal deficit present.      Mental Status: She is alert. Mental status is at baseline.          Additional Data:     Labs:  Results from last 7 days   Lab Units 06/04/24  0530 06/03/24  0536   WBC Thousand/uL 12.45* 14.97*   HEMOGLOBIN g/dL 9.1* 9.8*   HEMATOCRIT % 31.9* 33.4*   PLATELETS Thousands/uL 371 371   SEGS PCT %  --  89*   LYMPHO PCT %  --  5*   MONO PCT %  --  5   EOS PCT %  --  0     Results from last 7 days   Lab Units 06/07/24  0544   SODIUM mmol/L 132*   POTASSIUM mmol/L 3.5   CHLORIDE mmol/L 94*   CO2 mmol/L 30   BUN mg/dL 32*   CREATININE mg/dL 1.30   ANION GAP mmol/L 8   CALCIUM mg/dL 8.9   ALBUMIN g/dL 2.7*   TOTAL BILIRUBIN mg/dL 0.64   ALK PHOS U/L 323*   ALT U/L 20   AST U/L 24   GLUCOSE RANDOM mg/dL 170*         Results from last 7 days   Lab Units 06/07/24  1145 06/07/24  0723 06/06/24  2125 06/06/24  1557 06/06/24  1053 06/06/24  0807 06/05/24  2031 06/05/24  1530 06/05/24  1132 06/05/24  0727 06/04/24  2118 06/04/24  1718   POC GLUCOSE mg/dl 231* 173* 175* 163* 215* 213* 150* 160* 194* 160* 179* 234*               Lines/Drains:  Invasive Devices       Central  Venous Catheter Line  Duration             Port A Cath 12/14/21 Right Chest 906 days              Peripheral Intravenous Line  Duration             Peripheral IV 06/02/24 Right Antecubital 4 days    Peripheral IV 06/07/24 Left;Ventral (anterior) Forearm <1 day                    Central Line:  Goal for removal:  Chronic             Imaging: Reviewed radiology reports from this admission including: chest xray, CT head, and MRI spine    Recent Cultures (last 7 days):         Last 24 Hours Medication List:   Current Facility-Administered Medications   Medication Dose Route Frequency Provider Last Rate    acetaminophen  975 mg Oral Q8H PRN Rito Sheridan, DO      apixaban  5 mg Oral BID Rito Simes, DO      aspirin  81 mg Oral Daily Rito Simes, DO      atorvastatin  40 mg Oral Daily Rito Simes, DO      busPIRone  7.5 mg Oral HS Rito Sheridan, DO      cyanocobalamin  100 mcg Oral Daily Rito Simes, DO      folic acid  1 mg Oral Daily Rito Simes, DO      gabapentin  200 mg Oral BID Rito Simes, DO      insulin lispro  1-5 Units Subcutaneous TID AC Rito Sheridan, DO      insulin lispro  1-5 Units Subcutaneous HS Rito Sheridan, DO      lidocaine   Topical 4x Daily Jennifer Paige Bloch, CRNP      melatonin  3 mg Oral HS Marta Lazo MD      metoprolol tartrate  50 mg Oral Q12H Emmanuelle Velasquez MD      oxyCODONE  2.5 mg Oral Q4H PRN Kyle Brunner, MD      Or    oxyCODONE  5 mg Oral Q4H PRN Kyle Brunner, MD      pantoprazole  40 mg Oral Early Morning Rito Sheridan, DO      polyethylene glycol  17 g Oral Daily Rito Sheridan, DO      prochlorperazine  10 mg Oral TID PRN Rito Sheridan, DO      senna-docusate sodium  2 tablet Oral HS Jennifer Paige Bloch, CRNP      sodium chloride  1 g Oral BID With Meals Shaun Banegas MD      sotalol  80 mg Oral Q12H STEPHIE Emmanuelle Velasquez MD      Sotorasib  960 mg Oral Daily Rito Sheridan, DO      venlafaxine  100 mg Oral HS Marta Lazo MD          Today, Patient Was Seen By: Jeana  Megan Edwards MD    **Please Note: This note may have been constructed using a voice recognition system.**

## 2024-06-07 NOTE — ASSESSMENT & PLAN NOTE
Lab Results   Component Value Date    HGBA1C 7.2 (H) 05/08/2024       Recent Labs     06/06/24  1557 06/06/24  2125 06/07/24  0723 06/07/24  1145   POCGLU 163* 175* 173* 231*         Blood Sugar Average: Last 72 hrs:  (P) 187.0386743791679894    Stable for discharge  Re-start home medications per discharge instructions/AVS.

## 2024-06-07 NOTE — CONSULTS
[facility]  Progress Note  Name: Melany Griffith I  MRN: [id number]  Unit/Bed#: S -01 I Date of Admission: 2024   Date of Service: 2024 I Hospital Day: 9    Assessment & Plan   Palliative care encounter  Assessment & Plan  Social support provided for daughter Liz:  Just wants to make right decision at the right time  Feels a trial of rehab is appropriate but will likely transition to hospice if needs readmission  Will follow Palliative Medicine until ready for hospice  Supportive listening provided  Normalized experience of patient/family  Provided anxiety containment  Provided anticipatory guidance  Encouraged self care  Discussed spiritual needs    Follow up  Palliative Care will continue to follow and goals of care discussions will be ongoing.    Please reach out via [place] [facility] if questions or concerns arise.    Care Coordination  Reviewed case with [place], Dr. Ferrer  Reviewed case with Aviva CORBIN    I have reviewed the patient's controlled substance dispensing history in the Prescription Drug Monitoring Program in compliance with the [facility] regulations before prescribing any controlled substances.    Decisional apparatus:  Patient is not competent on exam today.  Patient's substitute decision maker would default to daughter by [place] Act 169. Spouse is .  ER contacts:  Name Relation Home Work Mobile   Liz Booth Daughter   [phone]   Patel Reinoso Brother   [phone]   Rito Griffith Son   [phone]   TayMich solano Friend   [phone]     Advance Directive/Living Will: on file and reviewed    We appreciate the invitation to be involved in this patient's care.  We will continue to follow throughout this hospitalization.  Please do not hesitate to reach our on call provider through our clinic answering service at [phone] should you have acute symptom control concerns.    Jennifer P. Bloch, MSN, CRNP, ACHPN  Palliative and Supportive  Care  Clinic/Answering Service: [phone]  You can find me on [facility]!       Severe protein-calorie malnutrition (HCC)  Assessment & Plan  Malnutrition Findings:   Adult Malnutrition type: Chronic illness  Adult Degree of Malnutrition: Other severe protein calorie malnutrition  Malnutrition Characteristics: Weight loss, Inadequate energy, Muscle loss              PO PO intake  Not drinking ensure, switch to ensure clear today  Family to focus on assisting patient with meal intake when visiting at rehab        360 Statement: Chronic/severe malnutrition r/t decreased PO intake/condition, as evidenced by 9.5% wt loss x 3 months (5/21/24: 152#, 2/27/24: 168#),  intake meeting <75% of estimated needs for > 1 month, and severe muscle mass depletion (temporal region). Treatment: liberal PO diet + nutrition supplements    BMI Findings:           Body mass index is 27.7 kg/m².       Ambulatory dysfunction  Assessment & Plan  Family wishes to pursue a trial period of rehab prior to deciding on hospice.  Hospice has been discussed with daughter however not patient at this time    Constipation  Assessment & Plan  Bowel regimen increased today   Continue miralax daily   Start Senna S 2 @ hs   Start suppository once daily PRN    Goals of care, counseling/discussion  Assessment & Plan  Level 3 code status  Disease focused care with DNR/DNI limits placed.   Concerns introduced today include:  STR versus hospice - family wishes to pursue STR and will continue thinking about hospice if unsuccessful with rehab.    Will continue discussions regarding GOC as patient's clinical presentation evolves.  Encouraged follow up with Palliative Medicine on an outpatient basis after discharge for continued symptom management.  Our office will contact patient to schedule a hospital follow up.         Chronic diastolic heart failure (HCC)  Assessment & Plan  Wt Readings from Last 3 Encounters:   06/07/24 68.7 kg (151 lb 7.3 oz)   05/21/24  69.3 kg (152 lb 12.8 oz)   05/20/24 71.4 kg (157 lb 8 oz)               Adenocarcinoma of lung, stage 4 (HCC)  Assessment & Plan  Wishes to continue treatment at this time  Hospice has been discussed with daughter however not patient at this time                     IDENTIFICATION:  Inpatient consult to Palliative Care  Consult performed by: Jennifer Paige Bloch, CRNP  Consult ordered by: Jeana Edwards MD        Physician Requesting Consult: Shawn Ferrer DO  Reason for Consult / Principal Problem: GOC counseling and SM secondary to metastatic lung cancer    History of Present Illness:  Melany Griffith is a 73 y.o. female who presents with a palliative diagnosis of metastatic lung cancer. S/p chemo, radiation, and immunotherapy. Follows w/ Dr. Sam outpatient. Currently on Sotorasib only.  She was brought into the ED at [facility] on 6/7/24 after a fall due to weakness. No LOC. She was found to have an ADTIYA and was admitted for medical management. The decision was made for patient to transition to STR however as discharged approached family decided they also wanted to consider hospice.  Palliative Medicine has been consulted to assist with goals of care counseling during this admission.     Patient seen with her DIL present. She is pleasantly confused. Reports ongoing pain which is chronic in nature.  Took oxy 2.5 this morning which she found very effective.  Uses lidocaine cream at home which was added to regimen.    Poor PO intake.  Many ensure bedside.  Says she doesn't like the thickness of it.  Nutritionist switched her to ensure clear which she will try tonight.    Call placed to [facility].  Discussed expected disease trajectory and prognosis. Explained disease focused care with STR versus comfort care and hospice. Explained these topics in great detail.  Allowed Liz time to express concerns, share her feelings about this and ask questions.  At this time, she wants to continue with plans  for STR and they will continue to discuss hospice as a family.     Review of Systems   All other systems reviewed and are negative.      Past Medical History:   Diagnosis Date    A-fib (HCC)     Arthritis     Atrial fibrillation (HCC)     Confusion     Diabetes mellitus (HCC)     Diabetes mellitus type 2, uncomplicated (HCC)     Last assessed: 8/17/17    Encephalopathy 1/6/2022    Essential hypertension     Frozen shoulder     L shoulder    GERD (gastroesophageal reflux disease)     Hx of cancer of uterus     Last assessed: 8/21/15    Hyperlipidemia     Hypertension     Hyponatremia 11/16/2016    Lung mass     diagnosed 9/2016    Malignant neoplasm without specification of site (HCC)     Skin cancer, basal cell     right eye area    Stage 4 lung cancer (HCC)     Thrombocytopenia, unspecified (HCC) 1/20/2023     Past Surgical History:   Procedure Laterality Date    APPENDECTOMY      BRONCHOSCOPY N/A 11/17/2016    Procedure: BRONCHOSCOPY FLEXIBLE;  Surgeon: Giles Alonso MD;  Location: [facility];  Service:     CHOLECYSTECTOMY      COLONOSCOPY      COLONOSCOPY      FL GUIDED CENTRAL VENOUS ACCESS DEVICE INSERTION  12/14/2021    GALLBLADDER SURGERY      HYSTERECTOMY  2000    Total abdominal    JOINT REPLACEMENT      LARYNGOSCOPY      Flexible Fiberoptic, (Therapeutic), Resolved: 11/17/16    MOHS SURGERY      Micrographic Surgery Face    [facility] INCL FLUOR GDNCE DX W/CELL WASHG SPX N/A 5/24/2017    Procedure: BRONCHOSCOPY FLEXIBLE;  Surgeon: Denzel Manning MD;  Location: [facility];  Service: Pulmonary    [facility] BRONCHOSCOPY NEEDLE BX TRACHEA MAIN STEM&/BRON N/A 11/17/2016    Procedure: EBUS; FROZEN SECTION ;  Surgeon: Giles Alonso MD;  Location: [facility];  Service: Thoracic    [facility] INSJ TUNNELED CTR VAD W/SUBQ PORT AGE 5 YR/> N/A 12/14/2021    Procedure: INSERTION VENOUS PORT ( PORT-A-CATH) IR;  Surgeon: Hansel Garduno DO;  [facility];  Service: Interventional Radiology    TONSILECTOMY AND ADNOIDECTOMY       TONSILLECTOMY      TOTAL KNEE ARTHROPLASTY Right 2014     Social History     Socioeconomic History    Marital status:      Spouse name: Not on file    Number of children: Not on file    Years of education: Not on file    Highest education level: Not on file   Occupational History    Occupation: retired   Tobacco Use    Smoking status: Former     Current packs/day: 0.00     Average packs/day: 1 pack/day for 50.0 years (50.0 ttl pk-yrs)     Types: Cigarettes     Start date:      Quit date:      Years since quittin.4     Passive exposure: Past    Smokeless tobacco: Never    Tobacco comments:     Quit in    Vaping Use    Vaping status: Never Used   Substance and Sexual Activity    Alcohol use: No    Drug use: No    Sexual activity: Not Currently   Other Topics Concern    Not on file   Social History Narrative    ** Merged History Encounter **         Family dynamics: Supportive  Relationship status:    Children and Ages:1 adult dtr Liz, 1 adult son Rito  Other important family information: Caregiver for her brother Patel, who is disabled  Living situation: Lives with brother        Employm    ent history/source of income: Worked as a  for [employer] prior to [employer]   Spirituality/ Samaritan: Sikhism    Patient's strengths, social supports, and resources: Supportive family  Hobbies/Interests: Sewing; Crafting  Advanced Directiv    e: States [facility] is her POA       Social Determinants of Health     Financial Resource Strain: Low Risk  (2023)    Overall Financial Resource Strain (CARDIA)     Difficulty of Paying Living Expenses: Not hard at all   Food Insecurity: No Food Insecurity (2024)    Hunger Vital Sign     Worried About Running Out of Food in the Last Year: Never true     Ran Out of Food in the Last Year: Never true   Transportation Needs: No Transportation Needs (2024)    PRAPARE - Transportation     Lack of Transportation (Medical): No      Lack of Transportation (Non-Medical): No   Physical Activity: Not on file   Stress: Not on file   Social Connections: Not on file   Intimate Partner Violence: Not on file   Housing Stability: Low Risk  (5/30/2024)    Housing Stability Vital Sign     Unable to Pay for Housing in the Last Year: No     Number of Times Moved in the Last Year: 0     Homeless in the Last Year: No     Family History   Problem Relation Age of Onset    Heart disease Father         cardiac disorder    Hypertension Father     Arthritis Father     Stroke Father         cerebrovascular accident    Skin cancer Father     Diabetes Mother     Heart disease Mother     Dementia Mother     Hypertension Mother     Thyroid disease Mother     Cancer Maternal Grandfather         of unknown origin    Cancer Family     Depression Family     Diabetes Family     Hyperlipidemia Family         essential    Heart disease Family     Hypertension Family     Stroke Family         syndrome    Lung cancer Paternal Uncle     Muscular dystrophy Brother      Medications:  all current active meds have been reviewed    Allergies   Allergen Reactions    Amoxicillin Hives    Amoxicillin Rash and Hives    Cardizem [Diltiazem] Rash     Rash      Statins Myalgia     Severe muscle aching  Terrible pains    Zofran [Ondansetron] Palpitations       Objective:  /60   Pulse 67   Temp (!) 97.4 °F (36.3 °C)   Resp 14   Wt 68.7 kg (151 lb 7.3 oz)   LMP  (LMP Unknown)   SpO2 100%   BMI 27.70 kg/m²     Physical Exam  Vitals and nursing note reviewed.   Constitutional:       General: She is awake. She is not in acute distress.     Appearance: She is not toxic-appearing.   HENT:      Head: Normocephalic and atraumatic.      Mouth/Throat:      Mouth: Mucous membranes are dry.   Eyes:      General: No scleral icterus.        Right eye: No discharge.         Left eye: No discharge.      Conjunctiva/sclera: Conjunctivae normal.   Cardiovascular:      Rate and Rhythm: Normal  "rate.   Pulmonary:      Effort: Pulmonary effort is normal. No tachypnea, bradypnea, accessory muscle usage or respiratory distress.   Abdominal:      General: There is no distension.   Musculoskeletal:      Cervical back: Normal range of motion. Normal range of motion.      Right lower leg: No edema.      Left lower leg: No edema.   Skin:     General: Skin is dry.      Coloration: Skin is pale.      Findings: Bruising present.   Neurological:      Mental Status: She is alert.      Cranial Nerves: No dysarthria or facial asymmetry.      Comments: Mild confusion   Psychiatric:         Attention and Perception: Attention normal.         Mood and Affect: Mood and affect normal.         Speech: Speech normal.         Behavior: Behavior normal. Behavior is cooperative.         Cognition and Memory: Cognition is impaired. Memory is impaired.         Lab Results: I have personally reviewed pertinent labs., CBC: No results found for: \"WBC\", \"HGB\", \"HCT\", \"MCV\", \"PLT\", \"ADJUSTEDWBC\", \"RBC\", \"MCH\", \"MCHC\", \"RDW\", \"MPV\", \"NRBC\", CMP:   Lab Results   Component Value Date    SODIUM 132 (L) 06/07/2024    K 3.5 06/07/2024    CL 94 (L) 06/07/2024    CO2 30 06/07/2024    BUN 32 (H) 06/07/2024    CREATININE 1.30 06/07/2024    CALCIUM 8.9 06/07/2024    AST 24 06/07/2024    ALT 20 06/07/2024    ALKPHOS 323 (H) 06/07/2024    EGFR 40 06/07/2024     Imaging Studies: I have personally reviewed pertinent reports.  EKG, Pathology, and Other Studies: I have personally reviewed pertinent reports.    Counseling / Coordination of Care  Total floor / unit time spent today 90 minutes. Greater than 50% of total time was spent with the patient and / or family counseling and / or coordination of care. A description of the counseling / coordination of care: Reviewed chart, provided medical updates, determined goals of care, discussed palliative care and symptom management, discussed comfort care and hospice care, discussed code status, provided " "anticipatory guidance, determined competency and POA/HCA, determined social/family support, provided psychosocial support.     Portions of this document may have been created using dictation software and as such some \"sound alike\" terms may have been generated by the system. Do not hesitate to contact me with any questions or clarifications.        "

## 2024-06-07 NOTE — PLAN OF CARE
Problem: Potential for Falls  Goal: Patient will remain free of falls  Description: INTERVENTIONS:  - Educate patient/family on patient safety including physical limitations  - Instruct patient to call for assistance with activity   - Consult OT/PT to assist with strengthening/mobility   - Keep Call bell within reach  - Keep bed low and locked with side rails adjusted as appropriate  - Keep care items and personal belongings within reach  - Initiate and maintain comfort rounds  - Make Fall Risk Sign visible to staff  - Offer Toileting every  Hours, in advance of need  - Initiate/Maintain alarm  - Obtain necessary fall risk management equipment:   - Apply yellow socks and bracelet for high fall risk patients  - Consider moving patient to room near nurses station  6/7/2024 1240 by Moon Fuchs RN  Outcome: Progressing  6/7/2024 1035 by Moon Fuchs RN  Outcome: Progressing     Problem: Nutrition/Hydration-ADULT  Goal: Nutrient/Hydration intake appropriate for improving, restoring or maintaining nutritional needs  Description: Monitor and assess patient's nutrition/hydration status for malnutrition. Collaborate with interdisciplinary team and initiate plan and interventions as ordered.  Monitor patient's weight and dietary intake as ordered or per policy. Utilize nutrition screening tool and intervene as necessary. Determine patient's food preferences and provide high-protein, high-caloric foods as appropriate.     INTERVENTIONS:  - Monitor oral intake, urinary output, labs, and treatment plans  - Assess nutrition and hydration status and recommend course of action  - Evaluate amount of meals eaten  - Assist patient with eating if necessary   - Allow adequate time for meals  - Recommend/ encourage appropriate diets, oral nutritional supplements, and vitamin/mineral supplements  - Order, calculate, and assess calorie counts as needed  - Recommend, monitor, and adjust tube feedings and TPN/PPN based on assessed  needs  - Assess need for intravenous fluids  - Provide specific nutrition/hydration education as appropriate  - Include patient/family/caregiver in decisions related to nutrition  6/7/2024 1240 by Moon Fuchs RN  Outcome: Progressing  6/7/2024 1035 by Moon Fuchs RN  Outcome: Progressing     Problem: Prexisting or High Potential for Compromised Skin Integrity  Goal: Skin integrity is maintained or improved  Description: INTERVENTIONS:  - Identify patients at risk for skin breakdown  - Assess and monitor skin integrity  - Assess and monitor nutrition and hydration status  - Monitor labs   - Assess for incontinence   - Turn and reposition patient  - Assist with mobility/ambulation  - Relieve pressure over bony prominences  - Avoid friction and shearing  - Provide appropriate hygiene as needed including keeping skin clean and dry  - Evaluate need for skin moisturizer/barrier cream  - Collaborate with interdisciplinary team   - Patient/family teaching  - Consider wound care consult   6/7/2024 1240 by Moon Fuchs RN  Outcome: Progressing  6/7/2024 1035 by Moon Fuchs RN  Outcome: Progressing     Problem: PAIN - ADULT  Goal: Verbalizes/displays adequate comfort level or baseline comfort level  Description: Interventions:  - Encourage patient to monitor pain and request assistance  - Assess pain using appropriate pain scale  - Administer analgesics based on type and severity of pain and evaluate response  - Implement non-pharmacological measures as appropriate and evaluate response  - Consider cultural and social influences on pain and pain management  - Notify physician/advanced practitioner if interventions unsuccessful or patient reports new pain  6/7/2024 1240 by Moon Fuchs RN  Outcome: Progressing  6/7/2024 1035 by Moon Fuchs RN  Outcome: Progressing     Problem: INFECTION - ADULT  Goal: Absence or prevention of progression during hospitalization  Description: INTERVENTIONS:  - Assess  and monitor for signs and symptoms of infection  - Monitor lab/diagnostic results  - Monitor all insertion sites, i.e. indwelling lines, tubes, and drains  - Monitor endotracheal if appropriate and nasal secretions for changes in amount and color  - [place] appropriate cooling/warming therapies per order  - Administer medications as ordered  - Instruct and encourage patient and family to use good hand hygiene technique  - Identify and instruct in appropriate isolation precautions for identified infection/condition  6/7/2024 1240 by Moon Fuchs RN  Outcome: Progressing  6/7/2024 1035 by Moon Fuchs RN  Outcome: Progressing  Goal: Absence of fever/infection during neutropenic period  Description: INTERVENTIONS:  - Monitor WBC    6/7/2024 1240 by Moon Fuchs RN  Outcome: Progressing  6/7/2024 1035 by Moon Fuchs RN  Outcome: Progressing     Problem: SAFETY ADULT  Goal: Patient will remain free of falls  Description: INTERVENTIONS:  - Educate patient/family on patient safety including physical limitations  - Instruct patient to call for assistance with activity   - Consult OT/PT to assist with strengthening/mobility   - Keep Call bell within reach  - Keep bed low and locked with side rails adjusted as appropriate  - Keep care items and personal belongings within reach  - Initiate and maintain comfort rounds  - Make Fall Risk Sign visible to staff  - Offer Toileting every  Hours, in advance of need  - Initiate/Maintain alarm  - Obtain necessary fall risk management equipment:   - Apply yellow socks and bracelet for high fall risk patients  - Consider moving patient to room near nurses station  6/7/2024 1240 by Moon Fuchs RN  Outcome: Progressing  6/7/2024 1035 by Moon Fuchs RN  Outcome: Progressing  Goal: Maintain or return to baseline ADL function  Description: INTERVENTIONS:  -  Assess patient's ability to carry out ADLs; assess patient's baseline for ADL function and identify physical  deficits which impact ability to perform ADLs (bathing, care of mouth/teeth, toileting, grooming, dressing, etc.)  - Assess/evaluate cause of self-care deficits   - Assess range of motion  - Assess patient's mobility; develop plan if impaired  - Assess patient's need for assistive devices and provide as appropriate  - Encourage maximum independence but intervene and supervise when necessary  - Involve family in performance of ADLs  - Assess for home care needs following discharge   - Consider OT consult to assist with ADL evaluation and planning for discharge  - Provide patient education as appropriate  6/7/2024 1240 by Moon Fuchs RN  Outcome: Progressing  6/7/2024 1035 by Moon Fuchs RN  Outcome: Progressing     Problem: DISCHARGE PLANNING  Goal: Discharge to home or other facility with appropriate resources  Description: INTERVENTIONS:  - Identify barriers to discharge w/patient and caregiver  - Arrange for needed discharge resources and transportation as appropriate  - Identify discharge learning needs (meds, wound care, etc.)  - Arrange for interpretive services to assist at discharge as needed  - Refer to Case Management Department for coordinating discharge planning if the patient needs post-hospital services based on physician/advanced practitioner order or complex needs related to functional status, cognitive ability, or social support system  6/7/2024 1240 by Moon Fuchs RN  Outcome: Progressing  6/7/2024 1035 by Moon Fuchs RN  Outcome: Progressing     Problem: Knowledge Deficit  Goal: Patient/family/caregiver demonstrates understanding of disease process, treatment plan, medications, and discharge instructions  Description: Complete learning assessment and assess knowledge base.  Interventions:  - Provide teaching at level of understanding  - Provide teaching via preferred learning methods  6/7/2024 1240 by Moon Fuchs RN  Outcome: Progressing  6/7/2024 1035 by Moon Fuchs  RN  Outcome: Progressing

## 2024-06-07 NOTE — OCCUPATIONAL THERAPY NOTE
Occupational Therapy Progress Note     Patient Name: Melany Griffith  Today's Date: 6/7/2024  Problem List  Principal Problem:    ADITYA on CKD  Active Problems:    Paroxysmal atrial fibrillation (HCC)    Adenocarcinoma of lung, stage 4 (HCC)    Hyponatremia    Chronic diastolic heart failure (HCC)    Type 2 diabetes mellitus with stage 3 chronic kidney disease, without long-term current use of insulin, unspecified whether stage 3a or 3b CKD (HCC)    Lower extremity edema    Constipation    Fall    Ambulatory dysfunction    Acute pain of right shoulder    Sacral wound    Severe protein-calorie malnutrition (HCC)    Sepsis (HCC)              06/07/24 1334   OT Last Visit   OT Visit Date 06/07/24   Note Type   Note Type Treatment   Pain Assessment   Pain Assessment Tool 0-10   Pain Score No Pain   Restrictions/Precautions   Weight Bearing Precautions Per Order No   Other Precautions Cognitive;Chair Alarm;Bed Alarm   Lifestyle   Autonomy At baseline, pt is (I) c ADLs, needs A with IADLs. no AD vs occasional RW vs SPC use. lives c handicapped brother. + driving + falls   Reciprocal Relationships supportive cousin. brother   Service to Others retired   Intrinsic Gratification recently only watching tv   ADL   UB Dressing Assistance 4  Minimal Assistance   UB Dressing Deficit Increased time to complete;Supervision/safety;Verbal cueing   UB Dressing Comments don/doffing gown   Toileting Assistance  2  Maximal Assistance   Toileting Deficit Increased time to complete;Bedside commode;Clothing management down;Perineal hygiene;Clothing management up   Toileting Comments use of BSC for toileting, A for hygiene and clothing management despite encouragement for (I)   Functional Standing Tolerance   Time 1 min   Activity toilet hygiene   Comments Limited functional reach in standing due to need for BUE on RW, requiring mod A x1 for standing balance   Bed Mobility   Supine to Sit 4  Minimal assistance   Additional items Assist x  "1;Increased time required;Verbal cues;LE management  (extensive increased time)   Transfers   Sit to Stand 2  Maximal assistance   Additional items Assist x 1;Increased time required;Verbal cues   Stand to Sit 2  Maximal assistance   Additional items Assist x 1;Increased time required;Verbal cues   Functional Mobility   Functional Mobility 2  Maximal assistance   Additional Comments Ax1, side stepping to BSC and side stepping to recliner chair   Additional items Rolling walker   Subjective   Subjective \"I don't know what to do this food is all so dry\"   Cognition   Overall Cognitive Status Impaired   Arousal/Participation Alert;Cooperative   Attention Attends with cues to redirect   Orientation Level Oriented to person;Oriented to place;Disoriented to time;Disoriented to situation   Memory Decreased recall of recent events;Decreased recall of precautions   Following Commands Follows one step commands with increased time or repetition   Comments Pt pleasant and cooperative, delayed responses to questions and poor insight. Requiring cuing for initiation of tasks   Activity Tolerance   Activity Tolerance Patient tolerated treatment well   Medical Staff Made Aware RUBI Mustafa   Assessment   Assessment Pt seen on this date for skilled OT treatment session. At start of session pt supine in bed and requesting to use bathroom. Pt demonstrating improved transfers from supine > sit. Able to scoot self towards EOB by pulling on bed rail. Pt continues to require cuing during functional transfers and mobility, noted to be buckling knees - able to correct when pointed out. Pt with limited dynamic functional reaching in standing due to poor standing balance and need for UE support in standing. Pt requiring cuing throughout for posture, pacing, direction following and overall safety.  Pt would continue to benefit from skilled OT treatment sessions in order to address remaining deficits   Plan   Goal Expiration Date 06/13/24   OT " Treatment Day 3   OT Frequency 3-5x/wk   Discharge Recommendation   Rehab Resource Intensity Level, OT II (Moderate Resource Intensity)   AM-PAC Daily Activity Inpatient   Lower Body Dressing 1   Bathing 2   Toileting 2   Upper Body Dressing 3   Grooming 3   Eating 3   Daily Activity Raw Score 14   Daily Activity Standardized Score (Calc for Raw Score >=11) 33.39   AM-PAC Applied Cognition Inpatient   Following a Speech/Presentation 2   Understanding Ordinary Conversation 3   Taking Medications 2   Remembering Where Things Are Placed or Put Away 2   Remembering List of 4-5 Errands 2   Taking Care of Complicated Tasks 2   Applied Cognition Raw Score 13   Applied Cognition Standardized Score 30.46   End of Consult   Patient Position at End of Consult Bedside chair;Bed/Chair alarm activated;All needs within reach     UPDATED GOALS     Pt will complete UB ADLs Mod independent   for increased ADL independence within 10 days.      Pt will complete LB ADLs min A  for increased ADL independence within 10 days.      Pt will complete toileting min A  with use of DME for increased ADL independence within 10 days. PROGRESSING     Pt will demonstrate proper body mechanics to complete self-care transfers and functional mobility with Supervision household distances and use of LRAD for increased safety and functional independence within 10 days.      Pt will complete bed mobility min A for increased independence in repositioning, pressure offloading, and managing comfort. MET: upgrade to (S)     Pt will demonstrate proper body mechanics and fall prevention strategies during 100% of tx sessions for increased safety awareness during ADL/IADLs     Pt will demonstrate activity tolerance of 30 min in therapeutic tasks for increased participation in meaningful activities upon D/C. PROGRESSING     Pt will participate in ongoing cognitive assessments to assist with safe D/C planning and supervision/assistance recommendations.      Pt will  demonstrate OOB sitting tolerance of 2-4 hr/day for increased activity tolerance and engagement in leisure activities within 10 days. PROGRESSING      The patient's raw score on the AM-PAC Daily Activity Inpatient Short Form is 14. A raw score of less than 19 suggests the patient may benefit from discharge to post-acute rehabilitation services. Please refer to the recommendation of the Occupational Therapist for safe discharge planning.    Brinda Yuan MS, OTR/L

## 2024-06-07 NOTE — ASSESSMENT & PLAN NOTE
Wt Readings from Last 3 Encounters:   06/07/24 68.7 kg (151 lb 7.3 oz)   05/21/24 69.3 kg (152 lb 12.8 oz)   05/20/24 71.4 kg (157 lb 8 oz)

## 2024-06-07 NOTE — ASSESSMENT & PLAN NOTE
Level 3 code status  Disease focused care with DNR/DNI limits placed.   Concerns introduced today include:  STR versus hospice - family wishes to pursue STR and will continue thinking about hospice if unsuccessful with rehab.    Will continue discussions regarding GOC as patient's clinical presentation evolves.  Encouraged follow up with Palliative Medicine on an outpatient basis after discharge for continued symptom management.  Our office will contact patient to schedule a hospital follow up.

## 2024-06-07 NOTE — CASE MANAGEMENT
Case Management Discharge Planning Note    Patient name Melany Griffith  Location S /S -01 MRN [id number]  : 1950 Date 2024       Current Admission Date: 2024  Current Admission Diagnosis:ADITYA on CKD   Patient Active Problem List    Diagnosis Date Noted Date Diagnosed    Palliative care encounter 2024     Severe protein-calorie malnutrition (HCC) 2024     Fall 2024     Ambulatory dysfunction 2024     Acute pain of right shoulder 2024     Sacral wound 2024     Nausea 2024     Shoulder pain 05/10/2024     Microcytic anemia 05/10/2024     Neck muscle spasm 2024     Port-A-Cath in place 2024     Gait abnormality 2023     Bowel trouble 2023     Joint pain 2023     Constipation 2023     ADITYA on CKD 2022     Anemia due to antineoplastic chemotherapy 2022     Acquired trigger finger of right ring finger 2022     Cognitive decline 2022     Lower extremity edema 2022     GERD (gastroesophageal reflux disease) 2022     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     History of stroke 2022     Hypomagnesemia 2022     Lung cancer (HCC) 2022     Former smoker 2021     Diarrhea 2021     Chemotherapy induced neutropenia ([facility]) 2021     Hypercalcemia 2020     Palliative care patient 10/21/2020     Anemia in stage 3 chronic kidney disease  (HCC) 2020     Pneumonitis 2020     CINV (chemotherapy-induced nausea and vomiting) 2020     Cyst of pancreas 2020     Neoplastic malignant related fatigue 10/08/2019     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) 10/02/2019     Goals of care, counseling/discussion 2018     Anxiety 2018     Adhesive capsulitis of shoulder 2018     Current episode of major depressive disorder without prior episode 2017     Malignant neoplasm of bone with  metastases (HCC) 05/24/2017     Type 2 diabetes mellitus with stage 3 chronic kidney disease, without long-term current use of insulin, unspecified whether stage 3a or 3b CKD (HCC)      Cancer related pain 11/22/2016     Chronic diastolic heart failure (HCC) 11/20/2016     Adenocarcinoma of lung, stage 4 (HCC) 11/16/2016     Hyponatremia 11/16/2016     Paroxysmal atrial fibrillation (HCC) 08/27/2016     Primary hypertension 08/27/2016     Vitamin D deficiency 08/03/2015     Dyslipidemia 08/03/2015     Acid reflux 06/04/2015       LOS (days): 9  Geometric Mean LOS (GMLOS) (days): 4.4  Days to GMLOS:-4.7     OBJECTIVE:  Risk of Unplanned Readmission Score: 32.49         Current admission status: Inpatient   Preferred Pharmacy:   [facility]/pharmacy #[id number]  [place] - [address]  [address]  [place] [address]  Phone: [phone] Fax: [phone]    [facility] - [address] [address]  Phone: [phone] Fax: [phone]    Primary Care Provider: Salina Dwyer DO    Primary Insurance: MEDICARE  Secondary Insurance: [facility]    DISCHARGE DETAILS:                                          Other Referral/Resources/Interventions Provided:  Interventions: Short Term Rehab, Transportation to treatment  Referral Comments: Per SLIM and Palliative care - pt and dtr choosing to continue with STR and palliative care. Per SLIM - pt medically stable for d/c today. CM f/u with [facility] - unable to admit pt to facility today able to accept pt to [facility] unit tomorrow morning between 10am-3pm. Facility requesting repeat CBC and covid test. CM notified SLIM via [employer] secure chat of same. Transport referral placed to [facility], requested for 1100 tomorrow morning - pending confirmation. CM notified all parties of pending transport time. CM to f/u in AM for confirmed p/u time to [facility].         Treatment Team Recommendation: Short Term  Rehab  Discharge Destination Plan:: Short Term Rehab  Transport at Discharge : BLS Ambulance        Transported by (Company and Unit #): pending confirmation  ETA of Transport (Date): 06/08/24  ETA of Transport (Time): 1100                            Accepting Facility Name, City & State : [facility]  Receiving Facility/Agency Phone Number: [phone]  Facility/Agency Fax Number: [phone]

## 2024-06-07 NOTE — PLAN OF CARE
Problem: Potential for Falls  Goal: Patient will remain free of falls  Description: INTERVENTIONS:  - Educate patient/family on patient safety including physical limitations  - Instruct patient to call for assistance with activity   - Consult OT/PT to assist with strengthening/mobility   - Keep Call bell within reach  - Keep bed low and locked with side rails adjusted as appropriate  - Keep care items and personal belongings within reach  - Initiate and maintain comfort rounds  - Make Fall Risk Sign visible to staff  - Offer Toileting every  Hours, in advance of need  - Initiate/Maintain alarm  - Obtain necessary fall risk management equipment:   - Apply yellow socks and bracelet for high fall risk patients  - Consider moving patient to room near nurses station  Outcome: Progressing     Problem: Nutrition/Hydration-ADULT  Goal: Nutrient/Hydration intake appropriate for improving, restoring or maintaining nutritional needs  Description: Monitor and assess patient's nutrition/hydration status for malnutrition. Collaborate with interdisciplinary team and initiate plan and interventions as ordered.  Monitor patient's weight and dietary intake as ordered or per policy. Utilize nutrition screening tool and intervene as necessary. Determine patient's food preferences and provide high-protein, high-caloric foods as appropriate.     INTERVENTIONS:  - Monitor oral intake, urinary output, labs, and treatment plans  - Assess nutrition and hydration status and recommend course of action  - Evaluate amount of meals eaten  - Assist patient with eating if necessary   - Allow adequate time for meals  - Recommend/ encourage appropriate diets, oral nutritional supplements, and vitamin/mineral supplements  - Order, calculate, and assess calorie counts as needed  - Recommend, monitor, and adjust tube feedings and TPN/PPN based on assessed needs  - Assess need for intravenous fluids  - Provide specific nutrition/hydration education as  appropriate  - Include patient/family/caregiver in decisions related to nutrition  Outcome: Progressing     Problem: Prexisting or High Potential for Compromised Skin Integrity  Goal: Skin integrity is maintained or improved  Description: INTERVENTIONS:  - Identify patients at risk for skin breakdown  - Assess and monitor skin integrity  - Assess and monitor nutrition and hydration status  - Monitor labs   - Assess for incontinence   - Turn and reposition patient  - Assist with mobility/ambulation  - Relieve pressure over bony prominences  - Avoid friction and shearing  - Provide appropriate hygiene as needed including keeping skin clean and dry  - Evaluate need for skin moisturizer/barrier cream  - Collaborate with interdisciplinary team   - Patient/family teaching  - Consider wound care consult   Outcome: Progressing     Problem: PAIN - ADULT  Goal: Verbalizes/displays adequate comfort level or baseline comfort level  Description: Interventions:  - Encourage patient to monitor pain and request assistance  - Assess pain using appropriate pain scale  - Administer analgesics based on type and severity of pain and evaluate response  - Implement non-pharmacological measures as appropriate and evaluate response  - Consider cultural and social influences on pain and pain management  - Notify physician/advanced practitioner if interventions unsuccessful or patient reports new pain  Outcome: Progressing     Problem: INFECTION - ADULT  Goal: Absence or prevention of progression during hospitalization  Description: INTERVENTIONS:  - Assess and monitor for signs and symptoms of infection  - Monitor lab/diagnostic results  - Monitor all insertion sites, i.e. indwelling lines, tubes, and drains  - Monitor endotracheal if appropriate and nasal secretions for changes in amount and color  - Sunshine appropriate cooling/warming therapies per order  - Administer medications as ordered  - Instruct and encourage patient and family to  use good hand hygiene technique  - Identify and instruct in appropriate isolation precautions for identified infection/condition  Outcome: Progressing  Goal: Absence of fever/infection during neutropenic period  Description: INTERVENTIONS:  - Monitor WBC    Outcome: Progressing     Problem: SAFETY ADULT  Goal: Patient will remain free of falls  Description: INTERVENTIONS:  - Educate patient/family on patient safety including physical limitations  - Instruct patient to call for assistance with activity   - Consult OT/PT to assist with strengthening/mobility   - Keep Call bell within reach  - Keep bed low and locked with side rails adjusted as appropriate  - Keep care items and personal belongings within reach  - Initiate and maintain comfort rounds  - Make Fall Risk Sign visible to staff  - Offer Toileting every  Hours, in advance of need  - Initiate/Maintain alarm  - Obtain necessary fall risk management equipment:   - Apply yellow socks and bracelet for high fall risk patients  - Consider moving patient to room near nurses station  Outcome: Progressing  Goal: Maintain or return to baseline ADL function  Description: INTERVENTIONS:  -  Assess patient's ability to carry out ADLs; assess patient's baseline for ADL function and identify physical deficits which impact ability to perform ADLs (bathing, care of mouth/teeth, toileting, grooming, dressing, etc.)  - Assess/evaluate cause of self-care deficits   - Assess range of motion  - Assess patient's mobility; develop plan if impaired  - Assess patient's need for assistive devices and provide as appropriate  - Encourage maximum independence but intervene and supervise when necessary  - Involve family in performance of ADLs  - Assess for home care needs following discharge   - Consider OT consult to assist with ADL evaluation and planning for discharge  - Provide patient education as appropriate  Outcome: Progressing    Problem: DISCHARGE PLANNING  Goal: Discharge to  home or other facility with appropriate resources  Description: INTERVENTIONS:  - Identify barriers to discharge w/patient and caregiver  - Arrange for needed discharge resources and transportation as appropriate  - Identify discharge learning needs (meds, wound care, etc.)  - Arrange for interpretive services to assist at discharge as needed  - Refer to Case Management Department for coordinating discharge planning if the patient needs post-hospital services based on physician/advanced practitioner order or complex needs related to functional status, cognitive ability, or social support system  Outcome: Progressing     Problem: Knowledge Deficit  Goal: Patient/family/caregiver demonstrates understanding of disease process, treatment plan, medications, and discharge instructions  Description: Complete learning assessment and assess knowledge base.  Interventions:  - Provide teaching at level of understanding  - Provide teaching via preferred learning methods  Outcome: Progressing

## 2024-06-07 NOTE — PLAN OF CARE
Problem: OCCUPATIONAL THERAPY ADULT  Goal: Performs self-care activities at highest level of function for planned discharge setting.  See evaluation for individualized goals.  Description: Treatment Interventions: ADL retraining, Functional transfer training, UE strengthening/ROM, Endurance training, Cognitive reorientation, Patient/family training, Equipment evaluation/education, Compensatory technique education, Continued evaluation (cog assessment)          See flowsheet documentation for full assessment, interventions and recommendations.   Outcome: Progressing  Note: Limitation: Decreased ADL status, Decreased UE strength, Decreased Safe judgement during ADL, Decreased cognition, Decreased endurance, Decreased self-care trans, Decreased high-level ADLs (impaired balance, fxnl mobility, act dulce, FM coord, fxnl reach, trunk control, standing dulce, and strength, attention to task, safety awareness, insight, pacing, problem solving, and learning new tasks, response time)  Prognosis: Good  Assessment: Pt seen on this date for skilled OT treatment session. At start of session pt supine in bed and requesting to use bathroom. Pt demonstrating improved transfers from supine > sit. Able to scoot self towards EOB by pulling on bed rail. Pt continues to require cuing during functional transfers and mobility, noted to be buckling knees - able to correct when pointed out. Pt with limited dynamic functional reaching in standing due to poor standing balance and need for UE support in standing. Pt requiring cuing throughout for posture, pacing, direction following and overall safety.  Pt would continue to benefit from skilled OT treatment sessions in order to address remaining deficits     Rehab Resource Intensity Level, OT: II (Moderate Resource Intensity)

## 2024-06-07 NOTE — CASE MANAGEMENT
Case Management Discharge Planning Note    Patient name Melany Griffith  Location S /S -01 MRN [id number]  : 1950 Date 2024       Current Admission Date: 2024  Current Admission Diagnosis:ADITYA on CKD   Patient Active Problem List    Diagnosis Date Noted Date Diagnosed    Sepsis (HCC) 2024     Severe protein-calorie malnutrition (HCC) 2024     Fall 2024     Ambulatory dysfunction 2024     Acute pain of right shoulder 2024     Sacral wound 2024     Nausea 2024     Shoulder pain 05/10/2024     Microcytic anemia 05/10/2024     Neck muscle spasm 2024     Port-A-Cath in place 2024     Gait abnormality 2023     Bowel trouble 2023     Joint pain 2023     Constipation 2023     ADITYA on CKD 2022     Anemia due to antineoplastic chemotherapy 2022     Acquired trigger finger of right ring finger 2022     Cognitive decline 2022     Lower extremity edema 2022     GERD (gastroesophageal reflux disease) 2022     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HCC) 2022     History of stroke 2022     Hypomagnesemia 2022     Lung cancer (HCC) 2022     Former smoker 2021     Diarrhea 2021     Chemotherapy induced neutropenia ([facility]) 2021     Hypercalcemia 2020     Palliative care patient 10/21/2020     Anemia in stage 3 chronic kidney disease  (HCC) 2020     Pneumonitis 2020     CINV (chemotherapy-induced nausea and vomiting) 2020     Cyst of pancreas 2020     Neoplastic malignant related fatigue 10/08/2019     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) 10/02/2019     Medicare annual wellness visit, subsequent 2018     Anxiety 2018     Adhesive capsulitis of shoulder 2018     Current episode of major depressive disorder without prior episode 2017     Malignant neoplasm of bone with metastases  (HCC) 05/24/2017     Type 2 diabetes mellitus with stage 3 chronic kidney disease, without long-term current use of insulin, unspecified whether stage 3a or 3b CKD (HCC)      Cancer related pain 11/22/2016     Chronic diastolic heart failure (HCC) 11/20/2016     Adenocarcinoma of lung, stage 4 (HCC) 11/16/2016     Hyponatremia 11/16/2016     Paroxysmal atrial fibrillation (HCC) 08/27/2016     Primary hypertension 08/27/2016     Vitamin D deficiency 08/03/2015     Dyslipidemia 08/03/2015     Acid reflux 06/04/2015       LOS (days): 9  Geometric Mean LOS (GMLOS) (days): 4.4  Days to GMLOS:-4.5     OBJECTIVE:  Risk of Unplanned Readmission Score: 31.88         Current admission status: Inpatient   Preferred Pharmacy:   [facility]/pharmacy #[id number]  [place] - [address]  [address]  [place] [address]  Phone: [phone] Fax: [phone]    [facility] - [address] [address]  Phone: [phone] Fax: [phone]    Primary Care Provider: Salina Dwyer DO    Primary Insurance: MEDICARE  Secondary Insurance: [facility]    DISCHARGE DETAILS:                                                                            CM f/u with patient at bedside reviewed IMM with patient. CM also f/u with pt's daughter Liz via phone - reviewed IMM with daughter. Daughter inquired into if hospice may be more appropriate next steps for her mom as pt has not been eating/drinking well and is currently max assist for bed mobility and transfers. Dtr relayed concerns pt's cancer may have spread and would like more info for possible hospice comfort care. CM notified SLIM team via [employer] secure chat of dtr request to discuss hospice. CM to continue to follow for pt dcp needs either to [facility] STR or for hospice care.     IMM Given (Date):: 06/07/24  IMM Given to:: Patient  Family notified:: Patient and pt's dtr Liz                          English

## 2024-06-07 NOTE — PROCEDURES
Venous Access Line Insertion    Date/Time: 6/7/2024 10:40 AM    Performed by: Chon Antoine RN  Authorized by: Shawn Ferrer DO    Patient location:  Bedside  Other Assisting Provider: No    Consent:     Consent obtained:  Verbal    Consent given by:  Patient  Universal protocol:     Procedure explained and questions answered to patient or proxy's satisfaction: yes    Pre-procedure details:     Hand hygiene: Hand hygiene performed prior to insertion      Sterile barrier technique: All elements of maximal sterile technique followed      Skin preparation:  2% chlorhexidine    Skin preparation agent: Skin preparation agent completely dried prior to procedure    Procedure details:     Complex Venous Access Line Type: US Guided Peripheral IV      Peripheral IV Indications: other specified disorders of the vein      Orientation:  Left and ventral (anterior)    Location:  Forearm    Catheter size:  22 gauge    Patient evaluated for contraindications to access (i.e. fistula, thrombosis, etc): Yes      Site selection rationale:  Best available, pt preference.    Approach: percutaneous technique used      Patient position:  Flat    Sterile ultrasound techniques: Sterile gel and sterile probe covers were used      Number of attempts:  1    Successful placement: yes    Post-procedure details:     Post-procedure:  Dressing applied    Patient tolerance of procedure:  Tolerated well, no immediate complications

## 2024-06-07 NOTE — ASSESSMENT & PLAN NOTE
Social support provided for daughter Liz:  Just wants to make right decision at the right time  Feels a trial of rehab is appropriate but will likely transition to hospice if needs readmission  Will follow Palliative Medicine until ready for hospice  Supportive listening provided  Normalized experience of patient/family  Provided anxiety containment  Provided anticipatory guidance  Encouraged self care  Discussed spiritual needs    Follow up  Palliative Care will continue to follow and goals of care discussions will be ongoing.    Please reach out via [facility] if questions or concerns arise.    Care Coordination  Reviewed case with [facility], Dr. Ferrer  Reviewed case with Aviva CORBIN    I have reviewed the patient's controlled substance dispensing history in the Prescription Drug Monitoring Program in compliance with the [facility] regulations before prescribing any controlled substances.    Decisional apparatus:  Patient is not competent on exam today.  Patient's substitute decision maker would default to daughter by [place]. Spouse is .  ER contacts:  Name Relation Home Work Mobile   Liz Booth Daughter   [phone]   Patel Reinoso Brother   [phone]   HerRito ricks Son   [phone]   OliviaMich cantu Friend   [phone]     Advance Directive/Living Will: on file and reviewed    We appreciate the invitation to be involved in this patient's care.  We will continue to follow throughout this hospitalization.  Please do not hesitate to reach our on call provider through our clinic answering service at [phone] should you have acute symptom control concerns.    Jennifer P. Bloch, MSN, CRNP, [facility]  Palliative and Supportive Care  Clinic/Answering Service: [phone]  You can find me on [employer]!

## 2024-06-07 NOTE — ASSESSMENT & PLAN NOTE
Wishes to continue treatment at this time  Hospice has been discussed with daughter however not patient at this time

## 2024-06-07 NOTE — CASE MANAGEMENT
Case Management Discharge Planning Note    Patient name Melany Griffith  Location S /S -01 MRN [id number]  : 1950 Date 2024       Current Admission Date: 2024  Current Admission Diagnosis:ADITYA on CKD   Patient Active Problem List    Diagnosis Date Noted Date Diagnosed    Palliative care encounter 2024     Severe protein-calorie malnutrition (HCC) 2024     Fall 2024     Ambulatory dysfunction 2024     Acute pain of right shoulder 2024     Sacral wound 2024     Nausea 2024     Shoulder pain 05/10/2024     Microcytic anemia 05/10/2024     Neck muscle spasm 2024     Port-A-Cath in place 2024     Gait abnormality 2023     Bowel trouble 2023     Joint pain 2023     Constipation 2023     ADITYA on CKD 2022     Anemia due to antineoplastic chemotherapy 2022     Acquired trigger finger of right ring finger 2022     Cognitive decline 2022     Lower extremity edema 2022     GERD (gastroesophageal reflux disease) 2022     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[facility]) 2022     History of stroke 2022     Hypomagnesemia 2022     Lung cancer (HCC) 2022     Former smoker 2021     Diarrhea 2021     Chemotherapy induced neutropenia (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) 10/02/2019     Goals of care, counseling/discussion 2018     Anxiety 2018     Adhesive capsulitis of shoulder 2018     Current episode of major depressive disorder without prior episode 2017     Malignant neoplasm of bone with  metastases (HCC) 05/24/2017     Type 2 diabetes mellitus with stage 3 chronic kidney disease, without long-term current use of insulin, unspecified whether stage 3a or 3b CKD (HCC)      Cancer related pain 11/22/2016     Chronic diastolic heart failure (HCC) 11/20/2016     Adenocarcinoma of lung, stage 4 (HCC) 11/16/2016     Hyponatremia 11/16/2016     Paroxysmal atrial fibrillation (HCC) 08/27/2016     Primary hypertension 08/27/2016     Vitamin D deficiency 08/03/2015     Dyslipidemia 08/03/2015     Acid reflux 06/04/2015       LOS (days): 9  Geometric Mean LOS (GMLOS) (days): 4.4  Days to GMLOS:-4.7     OBJECTIVE:  Risk of Unplanned Readmission Score: 32.49         Current admission status: Inpatient   Preferred Pharmacy:   [facility]/pharmacy #[id number]  [facility] - [address]  [address] [address]  [place] [address]  Phone: [phone] Fax: [phone]    [facility] - [address] [address]  Phone: [phone] Fax: [phone]    Primary Care Provider: Salina Dwyer DO    Primary Insurance: MEDICARE  Secondary Insurance: [facility]    DISCHARGE DETAILS:                                          Other Referral/Resources/Interventions Provided:  Interventions: Short Term Rehab, Transportation to treatment  Referral Comments: Transport confirmed for 1200 tomorrow via [facility] to [facility]. All parties updated of confirmed p/u time.         Treatment Team Recommendation: Short Term Rehab  Discharge Destination Plan:: Short Term Rehab  Transport at Discharge : [facility]        Transported by (Company and Unit #): [facility]  ETA of Transport (Date): 06/08/24  ETA of Transport (Time): 1200                            Accepting Facility Name, City & State : [facility]  Receiving Facility/Agency Phone Number: [phone]  Facility/Agency Fax Number: [phone]

## 2024-06-07 NOTE — ASSESSMENT & PLAN NOTE
Family wishes to pursue a trial period of rehab prior to deciding on hospice.  Hospice has been discussed with daughter however not patient at this time

## 2024-06-07 NOTE — PROGRESS NOTES
NEPHROLOGY HOSPITAL PROGRESS NOTE   Melany Griffith 73 y.o. female MRN: [id number]  Unit/Bed#: S -01 Encounter: [id number]  Reason for Consult: Hyponatremia    ASSESSMENT and PLAN:  23-year-old female with history of stage IV lung carcinoma status post chemoradiation and immunotherapy, paroxysmal A-fib, heart failure with reduced ejection fraction, diabetes mellitus type 2, CVA presented with mechanical fall.  We are consulted for management of hyponatremia.    1.  Hyponatremia.  Admission sodium 130.  Baseline serum sodium normal range.  Serum osmolality 286 (?  Isoosmolar).  Serum immunofixation no monoclonal immunoglobulin, UPEP no monoclonal band, serum free light chain ratio within normal limits.  Urine osmolality 162 (after tolvaptan dose x2).  Urine sodium less than 10.  Consider SIADH as the likely etiology (Effexor, BuSpar use).  Sotorasib can also cause hyponatremia.  Sodium level today 132, corrected around 133.  Diuretics have been on hold due to rising creatinine.  Continue fluid restriction at 2000 cc per 24 hours.  Give sodium chloride tablet 1 g x 2 today.  She will need small dose of loop diuretic close to discharge, hold off for now.  Trend BMP.    2.  ADITYA.  Admission creatinine 1.57.  Creatinine improved to 1.16 on 06/03.  She has a secondary rise in creatinine with peak creatinine of 1.57 on 06/06.  Creatinine today 1.3..  UA with 4-10 RBC.  Kidney ultrasound completed with results pending.  Etiology of initial rise in creatinine most likely cardiorenal syndrome.  Etiology of secondary rising creatinine most likely due to overdiuresis.  Diuretics remain on hold.  No need for IV fluids.  Keep trending BMP.    3.  Bilateral lower extremity edema/fluid overload/chronic diastolic CHF.  Prior echocardiogram with EF 60%, G1 DD.  Status post IV Lasix 40 mg twice daily, transitioned to oral torsemide 20 mg twice daily on 06/03.  Leg swelling is significantly improved and agree with holding  torsemide for now in setting of secondary rising creatinine.  She will need low-dose loop diuretic closer to discharge.    4.  Metabolic acidosis.  Serum bicarbonate level improved to 30 today.  Status post discontinuation of sodium bicarbonate tablets on 06/04.  Continue to stay off sodium bicarbonate.    5.  Adenocarcinoma of the lung.  Status post chemo and immunotherapy.  Currently on sotorasib.    6.  Ambulatory dysfunction/fall.  Management per neurology and primary team.    Discussed with family medicine team.  After discussion, we agreed that creatinine has improved and to keep holding diuretics for now and need for low-dose loop diuretic close to discharge.    SUBJECTIVE / 24H INTERVAL HISTORY:  She denies dyspnea.  Leg swelling is improved.  She denies pain.    OBJECTIVE:  Current Weight: Weight - Scale: 68.7 kg (151 lb 7.3 oz)  Vitals:    06/06/24 1525 06/06/24 2128 06/07/24 0544 06/07/24 0727   BP: 129/68 127/73  125/73   BP Location: Right arm      Pulse: 62 79  (!) 50   Resp: 16   14   Temp: (!) 97.4 °F (36.3 °C)   97.6 °F (36.4 °C)   TempSrc: Oral      SpO2: 98% 98%  96%   Weight:   68.7 kg (151 lb 7.3 oz)        Intake/Output Summary (Last 24 hours) at 6/7/2024 0750  Last data filed at 6/7/2024 0545  Gross per 24 hour   Intake 300 ml   Output 446 ml   Net -146 ml     Review of Systems   Constitutional:  Negative for chills and fever.   HENT:  Negative for ear pain and sore throat.    Eyes:  Negative for pain and visual disturbance.   Respiratory:  Negative for cough and shortness of breath.    Cardiovascular:  Negative for chest pain and palpitations.   Gastrointestinal:  Negative for abdominal pain and vomiting.   Genitourinary:  Negative for dysuria and hematuria.   Musculoskeletal:  Negative for arthralgias and back pain.   Skin:  Negative for color change and rash.   Neurological:  Negative for seizures and syncope.   All other systems reviewed and are negative.    Physical Exam  Vitals and  nursing note reviewed.   Constitutional:       General: She is not in acute distress.     Appearance: She is well-developed.   HENT:      Head: Normocephalic and atraumatic.   Eyes:      Conjunctiva/sclera: Conjunctivae normal.   Cardiovascular:      Rate and Rhythm: Normal rate and regular rhythm.      Pulses: Normal pulses.      Heart sounds: Normal heart sounds. No murmur heard.  Pulmonary:      Effort: Pulmonary effort is normal. No respiratory distress.      Breath sounds: Normal breath sounds.   Abdominal:      Palpations: Abdomen is soft.      Tenderness: There is no abdominal tenderness.   Musculoskeletal:         General: No swelling.      Cervical back: Neck supple.      Right lower leg: No edema.      Left lower leg: No edema.      Comments: Leg swelling has significantly improved   Skin:     General: Skin is warm and dry.      Capillary Refill: Capillary refill takes less than 2 seconds.   Neurological:      Mental Status: She is alert.   Psychiatric:         Mood and Affect: Mood normal.       Medications:    Current Facility-Administered Medications:     acetaminophen (TYLENOL) tablet 975 mg, 975 mg, Oral, Q8H PRN, Rito Sheridan, DO, 975 mg at 06/01/24 2355    apixaban (ELIQUIS) tablet 5 mg, 5 mg, Oral, BID, Rito Simes, DO, 5 mg at 06/06/24 1733    aspirin (ECOTRIN LOW STRENGTH) EC tablet 81 mg, 81 mg, Oral, Daily, Rito Simes, DO, 81 mg at 06/06/24 0923    atorvastatin (LIPITOR) tablet 40 mg, 40 mg, Oral, Daily, Rito Simes, DO, 40 mg at 06/06/24 0922    busPIRone (BUSPAR) tablet 7.5 mg, 7.5 mg, Oral, HS, Rito Simes, DO, 7.5 mg at 06/06/24 2129    cyanocobalamin (VITAMIN B-12) tablet 100 mcg, 100 mcg, Oral, Daily, Rito Simes, DO, 100 mcg at 06/06/24 0923    folic acid (FOLVITE) tablet 1 mg, 1 mg, Oral, Daily, Rito Simes, DO, 1 mg at 06/06/24 0922    gabapentin (NEURONTIN) capsule 200 mg, 200 mg, Oral, BID, Rito Simes, DO, 200 mg at 06/06/24 1733    insulin lispro (HumALOG/ADMELOG) 100 units/mL subcutaneous  injection 1-5 Units, 1-5 Units, Subcutaneous, TID AC, 1 Units at 06/06/24 1733 **AND** Fingerstick Glucose (POCT), , , TID AC, Rito Sheridan DO    insulin lispro (HumALOG/ADMELOG) 100 units/mL subcutaneous injection 1-5 Units, 1-5 Units, Subcutaneous, HS, Rito Sheridan DO, 1 Units at 06/06/24 2130    melatonin tablet 3 mg, 3 mg, Oral, HS, Marta Lazo MD, 3 mg at 06/06/24 2129    metoprolol tartrate (LOPRESSOR) tablet 50 mg, 50 mg, Oral, Q12H, Emmanuelle Velasquez MD, 50 mg at 06/06/24 2129    oxyCODONE (ROXICODONE) split tablet 2.5 mg, 2.5 mg, Oral, Q4H PRN, 2.5 mg at 06/06/24 0443 **OR** oxyCODONE (ROXICODONE) IR tablet 5 mg, 5 mg, Oral, Q4H PRN, Kyle Brunner, MD, 5 mg at 06/04/24 1538    pantoprazole (PROTONIX) EC tablet 40 mg, 40 mg, Oral, Early Morning, Rito Sheridan DO, 40 mg at 06/07/24 0545    polyethylene glycol (MIRALAX) packet 17 g, 17 g, Oral, Daily, Rito Sheridan DO, 17 g at 06/06/24 0923    prochlorperazine (COMPAZINE) tablet 10 mg, 10 mg, Oral, TID PRN, Rito Sheridan DO    senna (SENOKOT) tablet 17.2 mg, 2 tablet, Oral, Daily, Shawn Ferrer DO, 17.2 mg at 06/06/24 0923    sotalol (BETAPACE) tablet 80 mg, 80 mg, Oral, Q12H STEPHIE, Emmanuelle Velasquez MD, 80 mg at 06/06/24 2129    Sotorasib TABS 960 mg, 960 mg, Oral, Daily, Rito Sheridan DO, 960 mg at 06/04/24 0810    venlafaxine (EFFEXOR) tablet 100 mg, 100 mg, Oral, HS, Marta Lazo MD, 100 mg at 06/06/24 5200    Laboratory Results:  Results from last 7 days   Lab Units 06/07/24  0544 06/06/24  0458 06/05/24  0503 06/04/24  0530 06/03/24  0536 06/02/24  2055 06/02/24  1352 06/02/24  0449 06/01/24  1259 06/01/24  0643   WBC Thousand/uL  --   --   --  12.45* 14.97*  --   --  13.75*  --  13.79*   HEMOGLOBIN g/dL  --   --   --  9.1* 9.8*  --   --  9.2*  --  9.3*   HEMATOCRIT %  --   --   --  31.9* 33.4*  --   --  31.6*  --  31.8*   PLATELETS Thousands/uL  --   --   --  371 371  --   --  388  --  381   POTASSIUM mmol/L 3.5 3.6 3.5 3.8  "3.8 4.8 4.2 3.5   < >  --    CHLORIDE mmol/L 94* 93* 92* 95* 96 95* 95* 94*   < >  --    CO2 mmol/L 30 24 30 30 28 24 23 24   < >  --    BUN mg/dL 32* 34* 31* 25 24 27* 27* 27*   < >  --    CREATININE mg/dL 1.30 1.57* 1.46* 1.23 1.16 1.17 1.19 1.27   < >  --    CALCIUM mg/dL 8.9 8.6 8.7 9.0 9.2 8.6 9.0 9.1   < >  --     < > = values in this interval not displayed.       Portions of the record may have been created with voice recognition software. Occasional wrong word or \"sound a like\" substitutions may have occurred due to the inherent limitations of voice recognition software. Read the chart carefully and recognize, using context, where substitutions have occurred. If you have any questions, please contact the dictating provider.    "

## 2024-06-08 VITALS
RESPIRATION RATE: 18 BRPM | WEIGHT: 155.4 LBS | HEART RATE: 125 BPM | DIASTOLIC BLOOD PRESSURE: 67 MMHG | SYSTOLIC BLOOD PRESSURE: 106 MMHG | TEMPERATURE: 97.6 F | OXYGEN SATURATION: 97 % | BODY MASS INDEX: 28.42 KG/M2

## 2024-06-08 LAB
ANION GAP SERPL CALCULATED.3IONS-SCNC: 12 MMOL/L (ref 4–13)
BUN SERPL-MCNC: 31 MG/DL (ref 5–25)
CALCIUM SERPL-MCNC: 8.9 MG/DL (ref 8.4–10.2)
CHLORIDE SERPL-SCNC: 92 MMOL/L (ref 96–108)
CO2 SERPL-SCNC: 27 MMOL/L (ref 21–32)
CREAT SERPL-MCNC: 1.23 MG/DL (ref 0.6–1.3)
ERYTHROCYTE [DISTWIDTH] IN BLOOD BY AUTOMATED COUNT: 20.9 % (ref 11.6–15.1)
GFR SERPL CREATININE-BSD FRML MDRD: 43 ML/MIN/1.73SQ M
GLUCOSE SERPL-MCNC: 162 MG/DL (ref 65–140)
HCT VFR BLD AUTO: 34.5 % (ref 34.8–46.1)
HGB BLD-MCNC: 10 G/DL (ref 11.5–15.4)
MCH RBC QN AUTO: 22.5 PG (ref 26.8–34.3)
MCHC RBC AUTO-ENTMCNC: 29 G/DL (ref 31.4–37.4)
MCV RBC AUTO: 78 FL (ref 82–98)
PLATELET # BLD AUTO: 373 THOUSANDS/UL (ref 149–390)
PMV BLD AUTO: 10.4 FL (ref 8.9–12.7)
POTASSIUM SERPL-SCNC: 3.6 MMOL/L (ref 3.5–5.3)
RBC # BLD AUTO: 4.44 MILLION/UL (ref 3.81–5.12)
SODIUM SERPL-SCNC: 131 MMOL/L (ref 135–147)
WBC # BLD AUTO: 14.17 THOUSAND/UL (ref 4.31–10.16)

## 2024-06-08 PROCEDURE — 99239 HOSP IP/OBS DSCHRG MGMT >30: CPT | Performed by: INTERNAL MEDICINE

## 2024-06-08 PROCEDURE — 80048 BASIC METABOLIC PNL TOTAL CA: CPT | Performed by: INTERNAL MEDICINE

## 2024-06-08 PROCEDURE — 97110 THERAPEUTIC EXERCISES: CPT

## 2024-06-08 PROCEDURE — 99232 SBSQ HOSP IP/OBS MODERATE 35: CPT | Performed by: INTERNAL MEDICINE

## 2024-06-08 PROCEDURE — 97530 THERAPEUTIC ACTIVITIES: CPT

## 2024-06-08 PROCEDURE — 85027 COMPLETE CBC AUTOMATED: CPT

## 2024-06-08 RX ORDER — POLYETHYLENE GLYCOL 3350 17 G/17G
17 POWDER, FOR SOLUTION ORAL DAILY
Qty: 20 EACH | Refills: 0
Start: 2024-06-08 | End: 2024-06-19

## 2024-06-08 RX ORDER — METOPROLOL TARTRATE 50 MG/1
50 TABLET, FILM COATED ORAL EVERY 12 HOURS
Qty: 60 TABLET | Refills: 0
Start: 2024-06-08 | End: 2024-06-19

## 2024-06-08 RX ORDER — SODIUM CHLORIDE 1 G/1
1 TABLET ORAL 2 TIMES DAILY WITH MEALS
Status: DISCONTINUED | OUTPATIENT
Start: 2024-06-08 | End: 2024-06-08 | Stop reason: HOSPADM

## 2024-06-08 RX ORDER — AMOXICILLIN 250 MG
2 CAPSULE ORAL
Qty: 60 TABLET | Refills: 0
Start: 2024-06-08 | End: 2024-06-19

## 2024-06-08 RX ORDER — SODIUM CHLORIDE 1 G/1
1 TABLET ORAL 2 TIMES DAILY WITH MEALS
Qty: 60 TABLET | Refills: 0
Start: 2024-06-08 | End: 2024-06-19

## 2024-06-08 RX ADMIN — ASPIRIN 81 MG: 81 TABLET, COATED ORAL at 09:33

## 2024-06-08 RX ADMIN — SODIUM CHLORIDE 1 G: 1 TABLET ORAL at 09:37

## 2024-06-08 RX ADMIN — VITAM B12 100 MCG: 100 TAB at 09:33

## 2024-06-08 RX ADMIN — ATORVASTATIN CALCIUM 40 MG: 40 TABLET, FILM COATED ORAL at 09:33

## 2024-06-08 RX ADMIN — SOTALOL HYDROCHLORIDE 80 MG: 80 TABLET ORAL at 09:33

## 2024-06-08 RX ADMIN — METOPROLOL TARTRATE 50 MG: 50 TABLET, FILM COATED ORAL at 09:33

## 2024-06-08 RX ADMIN — PANTOPRAZOLE SODIUM 40 MG: 40 TABLET, DELAYED RELEASE ORAL at 05:11

## 2024-06-08 RX ADMIN — APIXABAN 5 MG: 5 TABLET, FILM COATED ORAL at 09:33

## 2024-06-08 RX ADMIN — FOLIC ACID 1 MG: 1 TABLET ORAL at 09:33

## 2024-06-08 RX ADMIN — LIDOCAINE: 40 CREAM TOPICAL at 09:34

## 2024-06-08 RX ADMIN — GABAPENTIN 200 MG: 100 CAPSULE ORAL at 09:33

## 2024-06-08 RX ADMIN — SOTORASIB 960 MG: 120 TABLET, COATED ORAL at 09:34

## 2024-06-08 NOTE — DISCHARGE INSTR - AVS FIRST PAGE
Dear Melany Griffith,     It was our pleasure to care for you here at [facility].  It is our hope that we were always able to exceed the expected standards for your care during your stay.  You were hospitalized due to acute kidney injury (ADITYA) on chronic kidney disease (CKD).  You were cared for on the 3rd floor by Edenilson Scherer DO under the service of Shawn Ferrer DO with the [facility] Internal [facility] Hospitalist Group who covers for your primary care physician (PCP), Salina Dwyer DO, while you were hospitalized.  If you have any questions or concerns related to this hospitalization, you may contact us at .  For follow up as well as any medication refills, we recommend that you follow up with your primary care physician.  A registered nurse will reach out to you by phone within a few days after your discharge to answer any additional questions that you may have after going home.  However, at this time we provide for you here, the most important instructions / recommendations at discharge:     Notable Medication Adjustments -   STOP taking VALSARTAN - DO NOT TAKE THIS MEDICATION ANYMORE.   START taking METOPROLOL 50 mg every 12 hours.   START taking SODIUM 1 gram tablet TWICE daily   TAKE TORSEMIDE 10 MG 1-TABLET, ONLY IF your weight INCREASES more than 3 pounds in a day or more than 5 pounds in a week.    Testing Required after Discharge -   NONE  Important follow up information -   Schedule appointment with your Family Medicine Physician/PCP within 1-Week from discharge date  Follow up with Neurology as scheduled  Please review this entire after visit summary as additional general instructions including medication list, appointments, activity, diet, any pertinent wound care, and other additional recommendations from your care team that may be provided for you.      Sincerely,     Edenilson Scherer DO

## 2024-06-08 NOTE — PLAN OF CARE
Problem: Potential for Falls  Goal: Patient will remain free of falls  Description: INTERVENTIONS:  - Educate patient/family on patient safety including physical limitations  - Instruct patient to call for assistance with activity   - Consult OT/PT to assist with strengthening/mobility   - Keep Call bell within reach  - Keep bed low and locked with side rails adjusted as appropriate  - Keep care items and personal belongings within reach  - Initiate and maintain comfort rounds  - Make Fall Risk Sign visible to staff  - Offer Toileting every  Hours, in advance of need  - Initiate/Maintain alarm  - Obtain necessary fall risk management equipment:   - Apply yellow socks and bracelet for high fall risk patients  - Consider moving patient to room near nurses station  Outcome: Progressing     Problem: Nutrition/Hydration-ADULT  Goal: Nutrient/Hydration intake appropriate for improving, restoring or maintaining nutritional needs  Description: Monitor and assess patient's nutrition/hydration status for malnutrition. Collaborate with interdisciplinary team and initiate plan and interventions as ordered.  Monitor patient's weight and dietary intake as ordered or per policy. Utilize nutrition screening tool and intervene as necessary. Determine patient's food preferences and provide high-protein, high-caloric foods as appropriate.     INTERVENTIONS:  - Monitor oral intake, urinary output, labs, and treatment plans  - Assess nutrition and hydration status and recommend course of action  - Evaluate amount of meals eaten  - Assist patient with eating if necessary   - Allow adequate time for meals  - Recommend/ encourage appropriate diets, oral nutritional supplements, and vitamin/mineral supplements  - Order, calculate, and assess calorie counts as needed  - Recommend, monitor, and adjust tube feedings and TPN/PPN based on assessed needs  - Assess need for intravenous fluids  - Provide specific nutrition/hydration education as  appropriate  - Include patient/family/caregiver in decisions related to nutrition  Outcome: Progressing     Problem: Prexisting or High Potential for Compromised Skin Integrity  Goal: Skin integrity is maintained or improved  Description: INTERVENTIONS:  - Identify patients at risk for skin breakdown  - Assess and monitor skin integrity  - Assess and monitor nutrition and hydration status  - Monitor labs   - Assess for incontinence   - Turn and reposition patient  - Assist with mobility/ambulation  - Relieve pressure over bony prominences  - Avoid friction and shearing  - Provide appropriate hygiene as needed including keeping skin clean and dry  - Evaluate need for skin moisturizer/barrier cream  - Collaborate with interdisciplinary team   - Patient/family teaching  - Consider wound care consult   Outcome: Progressing     Problem: PAIN - ADULT  Goal: Verbalizes/displays adequate comfort level or baseline comfort level  Description: Interventions:  - Encourage patient to monitor pain and request assistance  - Assess pain using appropriate pain scale  - Administer analgesics based on type and severity of pain and evaluate response  - Implement non-pharmacological measures as appropriate and evaluate response  - Consider cultural and social influences on pain and pain management  - Notify physician/advanced practitioner if interventions unsuccessful or patient reports new pain  Outcome: Progressing     Problem: INFECTION - ADULT  Goal: Absence or prevention of progression during hospitalization  Description: INTERVENTIONS:  - Assess and monitor for signs and symptoms of infection  - Monitor lab/diagnostic results  - Monitor all insertion sites, i.e. indwelling lines, tubes, and drains  - Monitor endotracheal if appropriate and nasal secretions for changes in amount and color  - [place] appropriate cooling/warming therapies per order  - Administer medications as ordered  - Instruct and encourage patient and family to  use good hand hygiene technique  - Identify and instruct in appropriate isolation precautions for identified infection/condition  Outcome: Progressing  Goal: Absence of fever/infection during neutropenic period  Description: INTERVENTIONS:  - Monitor WBC    Outcome: Progressing     Problem: SAFETY ADULT  Goal: Patient will remain free of falls  Description: INTERVENTIONS:  - Educate patient/family on patient safety including physical limitations  - Instruct patient to call for assistance with activity   - Consult OT/PT to assist with strengthening/mobility   - Keep Call bell within reach  - Keep bed low and locked with side rails adjusted as appropriate  - Keep care items and personal belongings within reach  - Initiate and maintain comfort rounds  - Make Fall Risk Sign visible to staff  - Offer Toileting every  Hours, in advance of need  - Initiate/Maintain alarm  - Obtain necessary fall risk management equipment:   - Apply yellow socks and bracelet for high fall risk patients  - Consider moving patient to room near nurses station  Outcome: Progressing  Goal: Maintain or return to baseline ADL function  Description: INTERVENTIONS:  -  Assess patient's ability to carry out ADLs; assess patient's baseline for ADL function and identify physical deficits which impact ability to perform ADLs (bathing, care of mouth/teeth, toileting, grooming, dressing, etc.)  - Assess/evaluate cause of self-care deficits   - Assess range of motion  - Assess patient's mobility; develop plan if impaired  - Assess patient's need for assistive devices and provide as appropriate  - Encourage maximum independence but intervene and supervise when necessary  - Involve family in performance of ADLs  - Assess for home care needs following discharge   - Consider OT consult to assist with ADL evaluation and planning for discharge  - Provide patient education as appropriate  Outcome: Progressing  Goal: Maintains/Returns to pre admission functional  level  Description: INTERVENTIONS:  - Perform AM-PAC 6 Click Basic Mobility/ Daily Activity assessment daily.  - Set and communicate daily mobility goal to care team and patient/family/caregiver.   - Collaborate with rehabilitation services on mobility goals if consulted  - Perform Range of Motion  times a day.  - Reposition patient every  hours.  - Dangle patient  times a day  - Stand patient  times a day  - Ambulate patient  times a day  - Out of bed to chair  times a day   - Out of bed for meals  times a day  - Out of bed for toileting  - Record patient progress and toleration of activity level   Outcome: Progressing     Problem: DISCHARGE PLANNING  Goal: Discharge to home or other facility with appropriate resources  Description: INTERVENTIONS:  - Identify barriers to discharge w/patient and caregiver  - Arrange for needed discharge resources and transportation as appropriate  - Identify discharge learning needs (meds, wound care, etc.)  - Arrange for interpretive services to assist at discharge as needed  - Refer to Case Management Department for coordinating discharge planning if the patient needs post-hospital services based on physician/advanced practitioner order or complex needs related to functional status, cognitive ability, or social support system  Outcome: Progressing     Problem: Knowledge Deficit  Goal: Patient/family/caregiver demonstrates understanding of disease process, treatment plan, medications, and discharge instructions  Description: Complete learning assessment and assess knowledge base.  Interventions:  - Provide teaching at level of understanding  - Provide teaching via preferred learning methods  Outcome: Progressing

## 2024-06-08 NOTE — PLAN OF CARE
Problem: PHYSICAL THERAPY ADULT  Goal: Performs mobility at highest level of function for planned discharge setting.  See evaluation for individualized goals.  Description: Treatment/Interventions: Functional transfer training, LE strengthening/ROM, Therapeutic exercise, Endurance training, Cognitive reorientation, Patient/family training, Equipment eval/education, Gait training, Bed mobility, Compensatory technique education  Equipment Recommended: Walker       See flowsheet documentation for full assessment, interventions and recommendations.  Outcome: Progressing  Note: Prognosis: Good  Problem List: Decreased strength, Decreased endurance, Impaired balance, Decreased mobility, Decreased cognition, Impaired judgement, Decreased safety awareness  Assessment: Patient agreeable to participate in therapy session. Patient continues to require significant assistance for mobility trials. Sit<>stand with mod ax 1, increased time and instruction for body positioning and hand placement. Standing tolerance approx 40 seconds with fwd flexed posture & use of RW for UE support and min-mod ax1 for steadying balance. SPT recliner<>commode with max ax1 and close stand by of other, difficulty advancing LEs however able to assist with pivoting of B feet. Patient fatigues quickly requiring seated rest breaks and limiting further mobility. Ambulation trials remain not appropriate secondary to inability to advance LEs and limited tolerance in standing. Provided and reviewed HEP with expressed understanding from patient and daughter present at end of session. Continue to focus on OOB mobility with progression of transfers and standing tolerance as appropriate and able.  Barriers to Discharge: Decreased caregiver support (pt is her brother's caregiver)     Rehab Resource Intensity Level, PT: II (Moderate Resource Intensity)    See flowsheet documentation for full assessment.

## 2024-06-08 NOTE — PROGRESS NOTES
NEPHROLOGY HOSPITAL PROGRESS NOTE   Melany Griffith 73 y.o. female MRN: [id number]  Unit/Bed#: S -01 Encounter: [id number]  Reason for Consult: Hyponatremia    ASSESSMENT and PLAN:  23-year-old female with history of stage IV lung carcinoma status post chemoradiation and immunotherapy, paroxysmal A-fib, heart failure with reduced ejection fraction, diabetes mellitus type 2, CVA presented with mechanical fall.  We are consulted for management of hyponatremia.    1.  Hyponatremia.  Admission sodium 130.  Baseline serum sodium normal range.  Serum osmolality 286 (?  Isoosmolar).  Serum immunofixation no monoclonal immunoglobulin, UPEP no monoclonal band, serum free light chain ratio within normal limits.  Urine osmolality 162 (after tolvaptan dose x2).  Urine sodium less than 10.  Consider SIADH as the likely etiology (Effexor, BuSpar use).  Sotorasib can also cause hyponatremia.  Sodium level today 131.  Diuretics have been on hold due to increase in creatinine earlier in the week.  Continue fluid restriction at 2000 cc per 24 hours.  Continue sodium chloride tablet 1 g twice daily due to low solute intake.  Okay to discharge from nephrology perspective.    2.  ADITYA.  Admission creatinine 1.57.  Creatinine improved to 1.16 on 06/03.  She has a secondary rise in creatinine with peak creatinine of 1.57 on 06/06.  Creatinine today 1.23.  UA with 4-10 RBC.  Kidney ultrasound without hydronephrosis on 06/05.  Etiology of initial rise in creatinine most likely cardiorenal syndrome.  Etiology of secondary rising creatinine most likely due to overdiuresis.  Diuretics remain on hold.  Please do her standing weight today.  Use weight today as baseline and administer torsemide 10 mg for weight gain of more than 3 pounds in a day or more than 5 pounds in a week.    3.  Bilateral lower extremity edema/fluid overload/chronic diastolic CHF.  Prior echocardiogram with EF 60%, G1 DD.  Status post IV Lasix 40 mg twice daily,  transitioned to oral torsemide 20 mg twice daily on 06/03.  Leg swelling is significantly improved and agree with holding torsemide for now in setting of secondary rising creatinine.  Diuretic regimen as suggested in #2.    4.  Metabolic acidosis.  Serum bicarbonate level today 27.  Status post discontinuation of sodium bicarbonate tablets on 06/04.  Continue to stay off sodium bicarbonate.    5.  Adenocarcinoma of the lung.  Status post chemo and immunotherapy.  Currently on sotorasib.    6.  Ambulatory dysfunction/fall.  Management per neurology and primary team.    Discussed with family medicine team.  After discussion, we agreed to use diuretic as needed at time of discharge and to continue sodium chloride tablet 1 g twice daily.    SUBJECTIVE / 24H INTERVAL HISTORY:  Urine output recorded as 400 cc.  Denies dyspnea.  She has not ambulated.  She is hopeful for discharge today.    OBJECTIVE:  Current Weight: Weight - Scale: 68.7 kg (151 lb 7.3 oz)  Vitals:    06/07/24 1429 06/07/24 1845 06/07/24 1848 06/07/24 2111   BP: 126/60 113/64 113/64 129/65   Pulse: 67 69 69 67   Resp:    16   Temp: (!) 97.4 °F (36.3 °C)   97.6 °F (36.4 °C)   TempSrc:       SpO2: 100% 99%  98%   Weight:           Intake/Output Summary (Last 24 hours) at 6/8/2024 0538  Last data filed at 6/7/2024 1342  Gross per 24 hour   Intake 480 ml   Output 846 ml   Net -366 ml     Review of Systems   Constitutional:  Negative for chills and fever.   HENT:  Negative for ear pain and sore throat.    Eyes:  Negative for pain and visual disturbance.   Respiratory:  Negative for cough and shortness of breath.    Cardiovascular:  Negative for chest pain and palpitations.   Gastrointestinal:  Negative for abdominal pain and vomiting.   Genitourinary:  Negative for dysuria and hematuria.   Musculoskeletal:  Negative for arthralgias and back pain.   Skin:  Negative for color change and rash.   Neurological:  Negative for seizures and syncope.   All other  systems reviewed and are negative.    Physical Exam  Vitals and nursing note reviewed.   Constitutional:       General: She is not in acute distress.     Appearance: She is well-developed.   HENT:      Head: Normocephalic and atraumatic.   Eyes:      Conjunctiva/sclera: Conjunctivae normal.   Cardiovascular:      Rate and Rhythm: Normal rate and regular rhythm.      Pulses: Normal pulses.      Heart sounds: Normal heart sounds. No murmur heard.  Pulmonary:      Effort: Pulmonary effort is normal. No respiratory distress.      Breath sounds: Normal breath sounds.   Abdominal:      Palpations: Abdomen is soft.      Tenderness: There is no abdominal tenderness.   Musculoskeletal:         General: No swelling.      Cervical back: Neck supple.      Right lower leg: Edema present.      Left lower leg: Edema present.      Comments: Significant improvement in bilateral lower extremity swelling   Skin:     General: Skin is warm and dry.      Capillary Refill: Capillary refill takes less than 2 seconds.   Neurological:      Mental Status: She is alert.   Psychiatric:         Mood and Affect: Mood normal.       Medications:    Current Facility-Administered Medications:     acetaminophen (TYLENOL) tablet 975 mg, 975 mg, Oral, Q8H PRN, Rito Sheridan DO, 975 mg at 06/01/24 2355    apixaban (ELIQUIS) tablet 5 mg, 5 mg, Oral, BID, Rito Sheridan, , 5 mg at 06/07/24 1645    aspirin (ECOTRIN LOW STRENGTH) EC tablet 81 mg, 81 mg, Oral, Daily, Rito Sheridan, , 81 mg at 06/07/24 0942    atorvastatin (LIPITOR) tablet 40 mg, 40 mg, Oral, Daily, Rito Sheridan DO, 40 mg at 06/07/24 0942    busPIRone (BUSPAR) tablet 7.5 mg, 7.5 mg, Oral, HS, Rito Sheridan DO, 7.5 mg at 06/07/24 2112    cyanocobalamin (VITAMIN B-12) tablet 100 mcg, 100 mcg, Oral, Daily, Riot Sheridan DO, 100 mcg at 06/07/24 0942    folic acid (FOLVITE) tablet 1 mg, 1 mg, Oral, Daily, Rito Sheridan DO, 1 mg at 06/07/24 0942    gabapentin (NEURONTIN) capsule 200 mg, 200 mg, Oral, BID, Rito  DO Fatimah, 200 mg at 06/07/24 1645    glycerin (adult) rectal suppository 1 suppository, 1 suppository, Rectal, Daily PRN, Jennifer Paige Bloch, CRNP    insulin lispro (HumALOG/ADMELOG) 100 units/mL subcutaneous injection 1-5 Units, 1-5 Units, Subcutaneous, TID AC, 2 Units at 06/07/24 1646 **AND** Fingerstick Glucose (POCT), , , TID AC, Rito Sheridan DO    insulin lispro (HumALOG/ADMELOG) 100 units/mL subcutaneous injection 1-5 Units, 1-5 Units, Subcutaneous, HS, Rito Sheridan DO, 1 Units at 06/07/24 2115    lidocaine (LMX) 4 % cream, , Topical, 4x Daily, Jennifer Paige Bloch, CRNP    melatonin tablet 3 mg, 3 mg, Oral, HS, Marta Lazo MD, 3 mg at 06/07/24 2112    metoprolol tartrate (LOPRESSOR) tablet 50 mg, 50 mg, Oral, Q12H, Emmanuelle Velasquez MD, 50 mg at 06/07/24 1850    oxyCODONE (ROXICODONE) split tablet 2.5 mg, 2.5 mg, Oral, Q4H PRN, 2.5 mg at 06/06/24 0443 **OR** oxyCODONE (ROXICODONE) IR tablet 5 mg, 5 mg, Oral, Q4H PRN, Kyle Brunner, MD, 5 mg at 06/04/24 1538    pantoprazole (PROTONIX) EC tablet 40 mg, 40 mg, Oral, Early Morning, Rito Sheridan DO, 40 mg at 06/08/24 0511    polyethylene glycol (MIRALAX) packet 17 g, 17 g, Oral, Daily, Rito Sheridan DO, 17 g at 06/07/24 0941    prochlorperazine (COMPAZINE) tablet 10 mg, 10 mg, Oral, TID PRN, Rito Sheridan DO    senna-docusate sodium (SENOKOT S) 8.6-50 mg per tablet 2 tablet, 2 tablet, Oral, HS, Jennifer Paige Bloch, CRNP, 2 tablet at 06/07/24 2112    sotalol (BETAPACE) tablet 80 mg, 80 mg, Oral, Q12H STEPHIE, Emmanuelle Velasquez MD, 80 mg at 06/07/24 2112    Sotorasib TABS 960 mg, 960 mg, Oral, Daily, Rito Sheridan DO, 960 mg at 06/07/24 1007    venlafaxine (EFFEXOR) tablet 100 mg, 100 mg, Oral, HS, Marta Lazo MD, 100 mg at 06/07/24 2126    Laboratory Results:  Results from last 7 days   Lab Units 06/07/24  0544 06/06/24  0458 06/05/24  0503 06/04/24  0530 06/03/24  0536 06/02/24  2055 06/02/24  1352 06/02/24  0449 06/01/24  1259  "06/01/24  0643   WBC Thousand/uL  --   --   --  12.45* 14.97*  --   --  13.75*  --  13.79*   HEMOGLOBIN g/dL  --   --   --  9.1* 9.8*  --   --  9.2*  --  9.3*   HEMATOCRIT %  --   --   --  31.9* 33.4*  --   --  31.6*  --  31.8*   PLATELETS Thousands/uL  --   --   --  371 371  --   --  388  --  381   POTASSIUM mmol/L 3.5 3.6 3.5 3.8 3.8 4.8 4.2 3.5   < >  --    CHLORIDE mmol/L 94* 93* 92* 95* 96 95* 95* 94*   < >  --    CO2 mmol/L 30 24 30 30 28 24 23 24   < >  --    BUN mg/dL 32* 34* 31* 25 24 27* 27* 27*   < >  --    CREATININE mg/dL 1.30 1.57* 1.46* 1.23 1.16 1.17 1.19 1.27   < >  --    CALCIUM mg/dL 8.9 8.6 8.7 9.0 9.2 8.6 9.0 9.1   < >  --     < > = values in this interval not displayed.       Portions of the record may have been created with voice recognition software. Occasional wrong word or \"sound a like\" substitutions may have occurred due to the inherent limitations of voice recognition software. Read the chart carefully and recognize, using context, where substitutions have occurred. If you have any questions, please contact the dictating provider.    " REVIEW OF SYSTEMS:    CONSTITUTIONAL: Weakness. No fevers or chills  EYES/ENT: No visual changes;  No vertigo or throat pain   RESPIRATORY: No cough, wheezing, hemoptysis; No shortness of breath  CARDIOVASCULAR: No chest pain or palpitations  GASTROINTESTINAL: No abdominal or epigastric pain. Nausea and vomiting. No hematemesis; No diarrhea or constipation. No melena or hematochezia.  GENITOURINARY: No dysuria, frequency or hematuria  NEUROLOGICAL: No numbness or weakness  SKIN: No itching, rashes

## 2024-06-08 NOTE — PHYSICAL THERAPY NOTE
PHYSICAL THERAPY NOTE    Patient Name: Melany Griffith  Today's Date: 6/8/2024 06/08/24 0838   PT Last Visit   PT Visit Date 06/08/24   Note Type   Note Type Treatment   Pain Assessment   Pain Assessment Tool 0-10   Pain Score No Pain   Restrictions/Precautions   Weight Bearing Precautions Per Order No   Other Precautions Cognitive;Chair Alarm;Bed Alarm;Fall Risk   General   Family/Caregiver Present No   Subjective   Subjective Patient seated OOB in recliner and is agreeable to participate in therapy session. Pt identifers obtained from name & ID bracelet.   Bed Mobility   Supine to Sit Unable to assess   Sit to Supine Unable to assess   Additional Comments Patient seated OOB in recliner pre and post session with chair alarm engaged, call bell and belongings in reach.   Transfers   Sit to Stand 3  Moderate assistance   Additional items Assist x 1;Armrests;Increased time required;Verbal cues   Stand to Sit 3  Moderate assistance   Additional items Assist x 1;Armrests;Increased time required;Verbal cues   Stand pivot 2  Maximal assistance  (Recliner<>commode)   Additional items Assist x 1;Armrests;Increased time required;Verbal cues   Toilet transfer 3  Moderate assistance   Additional items Assist x 1;Armrests;Increased time required;Verbal cues;Commode   Additional Comments Standing tolerance approx 40 seconds with fwd flexed posture & use of RW   Ambulation/Elevation   Gait pattern Not appropriate  (fatigued post transfers, unable to advance LEs)   Assistive Device Rolling walker   Balance   Static Sitting Fair   Dynamic Sitting Fair -   Static Standing Poor +   Dynamic Standing Poor   Ambulatory Zero   Endurance Deficit   Endurance Deficit Yes   Endurance Deficit Description limited activity tolerance   Activity Tolerance   Activity Tolerance Patient limited by fatigue   Nurse Made Aware Spoke to RN   Assessment   Prognosis  Good   Problem List Decreased strength;Decreased endurance;Impaired balance;Decreased mobility;Decreased cognition;Impaired judgement;Decreased safety awareness   Assessment Patient agreeable to participate in therapy session. Patient continues to require significant assistance for mobility trials. Sit<>stand with mod ax 1, increased time and instruction for body positioning and hand placement. Standing tolerance approx 40 seconds with fwd flexed posture & use of RW for UE support and min-mod ax1 for steadying balance. SPT recliner<>commode with max ax1 and close stand by of other, difficulty advancing LEs however able to assist with pivoting of B feet. Patient fatigues quickly requiring seated rest breaks and limiting further mobility. Ambulation trials remain not appropriate secondary to inability to advance LEs and limited tolerance in standing. Provided and reviewed HEP with expressed understanding from patient and daughter present at end of session. Continue to focus on OOB mobility with progression of transfers and standing tolerance as appropriate and able.   Barriers to Discharge Decreased caregiver support  (pt is her brother's caregiver)   Goals   Patient Goals none stated   STG Expiration Date 06/08/24   PT Treatment Day 3   Plan   Treatment/Interventions Functional transfer training;LE strengthening/ROM;Therapeutic exercise;Endurance training;Cognitive reorientation;Patient/family training;Equipment eval/education;Bed mobility;Gait training;Spoke to nursing   PT Frequency 3-5x/wk   Discharge Recommendation   Rehab Resource Intensity Level, PT II (Moderate Resource Intensity)   Equipment Recommended Walker   Walker Package Recommended Wheeled walker   AM-PAC Basic Mobility Inpatient   Turning in Flat Bed Without Bedrails 2   Lying on Back to Sitting on Edge of Flat Bed Without Bedrails 2   Moving Bed to Chair 2   Standing Up From Chair Using Arms 2   Walk in Room 2   Climb 3-5 Stairs With Railing 1    Basic Mobility Inpatient Raw Score 11   Basic Mobility Standardized Score 30.25   [facility] Highest Level Of Mobility   -HLM Goal 4: Move to chair/commode   -HLM Achieved 4: Move to chair/commode     The patient's AM-PAC Basic Mobility Inpatient Short Form Raw Score is 11. A Raw score of less than or equal to 16 suggests the patient may benefit from discharge to post-acute rehabilitation services. Please also refer to the recommendation of the Physical Therapist for safe discharge planning.      Jeannette Youssef, PTA

## 2024-06-09 NOTE — DISCHARGE SUMMARY
[facility] Internal Medicine Residency Program - Supervising Attending Discharge Attestation    I have seen and examined the patient personally on 06/08/2024.  I rounded with the resident physician on this patient and deemed the patient appropriate for discharge.  I have reviewed the daily events, assessments, plans, and discharge instructions with both the resident physician and the patient on the day of discharge.  I agree with the statements made in the resident note below.  Any exceptions will be listed below.  Please review resident note below.    In terms of assessment and plan:  Ambulatory Dysfunction with Fall -   Neurology initially recommended MRI with and without contrast.  However, patient was not able to tolerate this even with sedation.  Request for MRI discontinued.  It was not felt to be high enough yield to warrant anesthesia based MRI.  PT / OT recommending rehab setting after discharge, level 2 rehab intensity and she will go to rehab once medically stable.  Fall precautions / bed alarm while in hospital.   ADITYA superimposed on CKD III -   Present on Admission? - Yes  Baseline Creatinine - 0.9 - 1.1  Suspected Causes / Differential - Felt most likely to be cardiorenal syndrome.   IVF - No further IV fluids.  Urinalysis reviewed. Innumerable bacteria and large leukocytes with negative nitrites.   Monitor I/O.  BMP in am.  Imaging -   None.  But no significant suspicion for post renal etiology.   Avoid Hypotension -   BP management - See Hypertension below.  Monitor blood pressures.  Avoid Nephrotoxins and Adjust renally Excreted Medications -   Discontinued valsartan completely, including after discharge.  Nephrology follow up after discharge.  Hyponatremia -   Suspected to be possible SIADH most likely.   SSRI use prehospital.   Responded to tolvaptan x 2 given as daily doses on 6/1/2024 and 6/2/2024.   Sodium chloride tablets 1 g twice daily will be continued after discharge.  Monitor I/O  Monitor  sodium levels on BMP.    SPEP / UPEP, serum free light chain negative.   Must also correct for any hyperglycemia.   Chronic Diastolic Heart Failure -   Diuretic   Due to poor PO intake (chronic) we do not feel she will tolerate a standing daily dose of diuretic.  Therefore recommending PRN dosing as follows:  If patient has weight gain of 3 lbs or more over approximate dry weight of 155.4 lbs, then should take diuretic torsemide 10 mg PO daily until decreased back to dry weight.  Beta Blocker - metoprolol.  Also on Sotalol.  ACEI / ARB - none currently.  Prehospital was taking valsartan.   Fluid restriction is 1800 ml/day.  BMP monitoring.   Diabetes mellitus type 2 (A1c 7.2%) -   Prehospital regimen -   Linagliptin (tradjenta) 5 mg PO daily.  Metformin 500 mg PO bid.  Inpatient regimen -   Insulin sliding scale.   Return to prehospital dosing at discharge.  Monitor blood sugars  Goal blood sugar in hospital 140 - 180.  Continue gabapentin.   Sacral Wound -  Wash Sacro Buttocks area with soap and water. Pat Dry. Apply Calazime to right buttocks TID and PRN.  Hydraguard to left buttock, b/l hips, b/l heels BID and PRN.  Elevate heels to offload pressure.  Ehob cushion when out of bed.  Turn/reposition q2h or when medically stable for pressure re-distribution on skin.  Moisturize skin daily with skin nourishing cream.  Seen by wound care team this admission.  Constipation -   Senna 2 tabs daily.    Continue miralax 17g PO daily.   Use as minimal amount of opioid as possible.  Monitor BMs.   Metastatic Lung Adenocarcinoma -   S/p radiation and immunotherapy / chemo.  Currently on Sotorasib from home meds.   Outpatient follow up with Dr. Sam.   Hypertension -   Home regimen -   Valsartan 160 mg bid.  Sotalol 80 mg bid.  Metoprolol 25 mg bid.  Inpatient regimen -   Metoprolol and sotalol at home doses.   Discontinued valsartan due to renal concerns.   Monitor blood pressures.     Total time for discharge: 45 minutes,  of which more than 50% of the time was spent in direct patient care and reviewing discharge instructions with the patient.    Shawn Ferrer DO

## 2024-06-10 ENCOUNTER — NURSING HOME VISIT (OUTPATIENT)
Dept: GERIATRICS | Facility: OTHER | Age: 74
End: 2024-06-10
Payer: MEDICARE

## 2024-06-10 DIAGNOSIS — R53.1 WEAKNESS: ICD-10-CM

## 2024-06-10 DIAGNOSIS — I10 PRIMARY HYPERTENSION: ICD-10-CM

## 2024-06-10 DIAGNOSIS — E11.22 TYPE 2 DIABETES MELLITUS WITH STAGE 3 CHRONIC KIDNEY DISEASE, WITHOUT LONG-TERM CURRENT USE OF INSULIN, UNSPECIFIED WHETHER STAGE 3A OR 3B CKD (HCC): ICD-10-CM

## 2024-06-10 DIAGNOSIS — N18.30 TYPE 2 DIABETES MELLITUS WITH STAGE 3 CHRONIC KIDNEY DISEASE, WITHOUT LONG-TERM CURRENT USE OF INSULIN, UNSPECIFIED WHETHER STAGE 3A OR 3B CKD (HCC): ICD-10-CM

## 2024-06-10 DIAGNOSIS — D63.8 ANEMIA IN OTHER CHRONIC DISEASES CLASSIFIED ELSEWHERE: ICD-10-CM

## 2024-06-10 DIAGNOSIS — E78.5 DYSLIPIDEMIA: ICD-10-CM

## 2024-06-10 DIAGNOSIS — I50.32 CHRONIC DIASTOLIC HEART FAILURE (HCC): ICD-10-CM

## 2024-06-10 DIAGNOSIS — Z91.89 AT RISK FOR DELIRIUM: ICD-10-CM

## 2024-06-10 DIAGNOSIS — Z91.89 AT RISK FOR CONSTIPATION: ICD-10-CM
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
## 2024-06-10 DIAGNOSIS — F41.9 ANXIETY: ICD-10-CM

## 2024-06-10 DIAGNOSIS — R41.89 COGNITIVE DECLINE: ICD-10-CM

## 2024-06-10 DIAGNOSIS — Z86.73 HISTORY OF STROKE: ICD-10-CM

## 2024-06-10 DIAGNOSIS — C34.92 ADENOCARCINOMA OF LEFT LUNG, STAGE 4 (HCC): Primary | ICD-10-CM

## 2024-06-10 DIAGNOSIS — K21.9 GASTROESOPHAGEAL REFLUX DISEASE WITHOUT ESOPHAGITIS: ICD-10-CM

## 2024-06-10 DIAGNOSIS — E87.1 HYPONATREMIA: ICD-10-CM

## 2024-06-10 DIAGNOSIS — I48.0 PAROXYSMAL ATRIAL FIBRILLATION (HCC): ICD-10-CM

## 2024-06-10 DIAGNOSIS — S31.000D WOUND OF SACRAL REGION, SUBSEQUENT ENCOUNTER: ICD-10-CM

## 2024-06-10 DIAGNOSIS — D72.823 LEUKEMOID REACTION: ICD-10-CM

## 2024-06-10 DIAGNOSIS — Z71.89 GOALS OF CARE, COUNSELING/DISCUSSION: ICD-10-CM

## 2024-06-10 DIAGNOSIS — N18.4 STAGE 4 CHRONIC KIDNEY DISEASE (HCC): ICD-10-CM

## 2024-06-10 PROCEDURE — 99306 1ST NF CARE HIGH MDM 50: CPT | Performed by: STUDENT IN AN ORGANIZED HEALTH CARE EDUCATION/TRAINING PROGRAM

## 2024-06-10 NOTE — PROGRESS NOTES
[facility]  Facility: [facility]    HISTORY AND PHYSICAL  Nursing Home Place of Service: nursing home place of service: POS 31 Skilled Care-Part A Coverage    NAME: Melany Griffith  : 1950 AGE: 73 y.o. SEX: female MRN: [id number]  DATE OF ENCOUNTER: 6/10/2024    Records Reviewed include: Hospital records    Chief Complaint/ Reason for Admission:   ADITYA, hyponatremia    History of Present Illness:     Melany Griffith is a 73 y.o. female with PMH including Afib, HTN, lung cancer, hyponatremia, CHF, DM2, CKD, GERD, depression/anxiety, anemia, CVA  Patient was hospitalized at [facility] from  to 24  For details of hospitalization, see hospital records including discharge documentation  Briefly, patient hospitalized after mechanical fall at home, trauma workup generally unremarkable; found to have ADITYA on CKD, improved with IV fluids; persistent lower extremity edema thought secondary to sotorasib; evaluated by Nephrology for hyponatremia as well, treated with tolvaptan; case was also evaluated by Neurology (for unwitnessed fall) and Palliative (for malnutrition/low appetite).    Patient seen and examined in room  Others present: none  Patient seated in chair  Appears comfortable, awake, alert, oriented to situation, able to converse appropriately  Patient polite, Aox2-3 with some forgetfulness apparent, in good spirits. Notes she is feeling well with no acute concerns. Feels since her fall at home (which she recalls) she has been having some bilateral hip pain L>R which is mild/tolerable and has been gradually improving over time, not wishing to increase her pain regimen at this time and feeling tylenol sufficient. No acute pain anywhere else. No CP/palpitations/SOB/orthopnea. Feels peripheral edema baseline/stable. Reports good appetite. Last BM yesterday, no abd pain/N/V. Urinating without acute symptoms including dysuria/hematuria. No acute cardiopulmonary,  "abdominal, or urinary symptoms; see ROS for more details.    Subsequently I called and spoke with patient's daughter and reported ADRI Hill. Extensive conversation discussing her present status and plan of care. Liz appreciative and amenable to plan as discussed. Per discussion family has noted some cognitive changes/decline gradually in patient since her stroke around 2 years ago with poor sense of time and some memory changes, though reportedly no formal memory diagnosis outpatient as yet, daughter also feeling that patient has been a bit more acutely forgetful/confused since being in the hospital this time. For example on my talk with the patient today she had stated her mother and father live at home with her, but daughter confirmed my suspicion that patient's parents are . Daughter affirms she has POA. Discussed patient's hospital course and labs as well as goals/palliative perspective, at present family wanting to see if patient can progress at rehab, but if she declines further or does not progress well here they would rather take her home and pursue outpatient palliative/hospice as opposed to getting her stuck in a cycle of hospitalizations. Daughter also confirms patient's DNR/DNI status. Daughter also provided collateral/details to assist with details of patient's living situation and social hx.    No further questions or acute concerns identified.    Lab Review:   6/10: BMP with Na 132 (stable/improved, recent low 127, seems intermittent on review), Cr 1.35 (recent peak 1.57 on , baseline seems 1.3-1.6), GFR 38 (baseline seems 30s-40s); CBC  with WBC 14.17 (recent peak 16.83 on ), Hb 10.0 (microcytic, baseline around 9-11); UPEP  \"No monoclonal bands noted\"; SPEP 6/3 \"Serum immunofixation shows no monoclonal immunoglobulins\"; UA  with leuks/bacteria (no culture appears done); recent TSH WNL; recent A1c 7.2        EKG 24: Sinus tachycardia with Premature atrial " complexes, LAD, 101bpm, Qtc 477  Echo Jan 2022: EF 60, grade 1 diastolic dysfunction    Social: Patient lives in a 2 story house with her brother (reportedly quadriplegic). At baseline does not have any caregiver for herself. She does drive but only locally and less over time. Family helps with meds/finances. At baseline uses walker as needed and reports ~2 falls in the past year.    Lives: Home,  Social Support: family  Fall in the past 12 months: ~2  Use of assistance Device: None and Walker    Allergies    Allergies   Allergen Reactions    Amoxicillin Hives    Amoxicillin Rash and Hives    Cardizem [Diltiazem] Rash     Rash      Statins Myalgia     Severe muscle aching  Terrible pains    Zofran [Ondansetron] Palpitations       Past Medical History  Past Medical History:   Diagnosis Date    A-fib (HCC)     Arthritis     Atrial fibrillation (HCC)     Confusion     Diabetes mellitus (HCC)     Diabetes mellitus type 2, uncomplicated (HCC)     Last assessed: 8/17/17    Encephalopathy 1/6/2022    Essential hypertension     Frozen shoulder     L shoulder    GERD (gastroesophageal reflux disease)     Hx of cancer of uterus     Last assessed: 8/21/15    Hyperlipidemia     Hypertension     Hyponatremia 11/16/2016    Lung mass     diagnosed 9/2016    Malignant neoplasm without specification of site (HCC)     Skin cancer, basal cell     right eye area    Stage 4 lung cancer (HCC)     Thrombocytopenia, unspecified (HCC) 1/20/2023        Past Surgical History:   Procedure Laterality Date    APPENDECTOMY      BRONCHOSCOPY N/A 11/17/2016    Procedure: BRONCHOSCOPY FLEXIBLE;  Surgeon: Giles Alonso MD;  Location: [facility];  Service:     CHOLECYSTECTOMY      COLONOSCOPY      COLONOSCOPY      FL GUIDED CENTRAL VENOUS ACCESS DEVICE INSERTION  12/14/2021    GALLBLADDER SURGERY      HYSTERECTOMY  2000    Total abdominal    JOINT REPLACEMENT      LARYNGOSCOPY      Flexible Fiberoptic, (Therapeutic), Resolved: 11/17/16    MOHS SURGERY       Micrographic Surgery Face    HI [facility] INCL FLUOR GDNCE DX W/CELL WASHG SPX N/A 2017    Procedure: BRONCHOSCOPY FLEXIBLE;  Surgeon: Denzel Manning MD;  Location: [facility];  Service: Pulmonary    HI BRONCHOSCOPY NEEDLE BX TRACHEA MAIN STEM&/BRON N/A 2016    Procedure: EBUS; FROZEN SECTION ;  Surgeon: Giles Alonso MD;  Location: [facility];  Service: Thoracic    HI INSJ TUNNELED CTR VAD W/SUBQ PORT AGE 5 YR/> N/A 2021    Procedure: INSERTION VENOUS PORT ( PORT-A-CATH) IR;  Surgeon: Hansel Garduno DO;  [facility];  Service: Interventional Radiology    TONSILECTOMY AND ADNOIDECTOMY      TONSILLECTOMY      TOTAL KNEE ARTHROPLASTY Right 2014       Family History  Family History   Problem Relation Age of Onset    Heart disease Father         cardiac disorder    Hypertension Father     Arthritis Father     Stroke Father         cerebrovascular accident    Skin cancer Father     Diabetes Mother     Heart disease Mother     Dementia Mother     Hypertension Mother     Thyroid disease Mother     Cancer Maternal Grandfather         of unknown origin    Cancer Family     Depression Family     Diabetes Family     Hyperlipidemia Family         essential    Heart disease Family     Hypertension Family     Stroke Family         syndrome    Lung cancer Paternal Uncle     Muscular dystrophy Brother        Social History  Social History     Tobacco Use   Smoking Status Former    Current packs/day: 0.00    Average packs/day: 1 pack/day for 50.0 years (50.0 ttl pk-yrs)    Types: Cigarettes    Start date:     Quit date:     Years since quittin.4    Passive exposure: Past   Smokeless Tobacco Never   Tobacco Comments    Quit in       Social History     Substance and Sexual Activity   Alcohol Use No      Social History     Substance and Sexual Activity   Drug Use No            Physical Exam    Vital Signs  There were no vitals filed for this visit.  BP: 119/56 mmHg  6/10/2024  "07:27   Temp:96.3 °F  6/10/2024 07:27 Pulse:68 bpm  6/10/2024 07:27 Weight:154 Lbs  6/9/2024 06:29   Resp:18 Breaths/min  6/10/2024 07:27 BS:165 mg/dL  6/10/2024 06:08 O2:96 %  6/10/2024 07:27 Pain:0  6/10/2024 09:40         Physical Exam  Vitals reviewed.   Constitutional:       General: She is not in acute distress.     Appearance: She is not toxic-appearing or diaphoretic.   HENT:      Head: Normocephalic and atraumatic.      Right Ear: External ear normal.      Left Ear: External ear normal.      Nose: Nose normal. No rhinorrhea.      Mouth/Throat:      Mouth: Mucous membranes are dry.      Pharynx: Oropharynx is clear. No posterior oropharyngeal erythema.   Eyes:      General: No scleral icterus.        Right eye: No discharge.         Left eye: No discharge.      Extraocular Movements: Extraocular movements intact.      Conjunctiva/sclera: Conjunctivae normal.      Pupils: Pupils are equal, round, and reactive to light.   Cardiovascular:      Rate and Rhythm: Normal rate and regular rhythm.   Pulmonary:      Effort: Pulmonary effort is normal. No respiratory distress.      Breath sounds: Normal breath sounds. No wheezing or rales.   Abdominal:      General: Bowel sounds are normal.      Palpations: Abdomen is soft.      Tenderness: There is no abdominal tenderness. There is no right CVA tenderness, left CVA tenderness or guarding.   Musculoskeletal:         General: Swelling present. No tenderness.      Cervical back: No rigidity.      Comments: 1+ bilateral lower extremity edema (stable/baseline per patient)   Skin:     General: Skin is warm and dry.      Coloration: Skin is not jaundiced.   Neurological:      General: No focal deficit present.      Mental Status: She is alert. Mental status is at baseline.      Comments: Aox2-3 (to self, to location \"[facility]\" and \"[place]\", to year 2024 but not to month  Some forgetfulness apparent   Psychiatric:         Mood and Affect: Mood normal.         Behavior: " Behavior normal.         Thought Content: Thought content normal.         Review of Systems:  Review of Systems   Constitutional:  Negative for appetite change, chills, diaphoresis and fever.   HENT:  Negative for drooling, ear pain, hearing loss, rhinorrhea, sore throat and trouble swallowing.    Eyes:  Negative for pain, discharge, redness, itching and visual disturbance.   Respiratory:  Negative for cough, chest tightness, shortness of breath and wheezing.    Cardiovascular:  Positive for leg swelling (chronic baseline). Negative for chest pain and palpitations.   Gastrointestinal:  Negative for abdominal pain, blood in stool, constipation, diarrhea, nausea and vomiting.   Genitourinary:  Negative for difficulty urinating, dysuria, flank pain, hematuria and urgency.   Musculoskeletal:  Positive for arthralgias and gait problem. Negative for back pain and neck pain.   Skin:  Positive for wound (sacral). Negative for color change.   Neurological:  Positive for weakness. Negative for dizziness, facial asymmetry, speech difficulty, light-headedness and headaches.   Psychiatric/Behavioral:  Negative for agitation, behavioral problems and confusion. The patient is not nervous/anxious and is not hyperactive.    All other systems reviewed and are negative.      List of Current Medications:  Current Outpatient Medications   Medication Instructions    acetaminophen (TYLENOL) 500-1,000 mg, Oral, 3 times daily PRN    apixaban (Eliquis) 5 mg TAKE 1 TABLET TWICE A DAY    aspirin (ECOTRIN LOW STRENGTH) 81 mg, Oral, Daily    atorvastatin (LIPITOR) 40 mg, Oral, Daily    busPIRone (BUSPAR) 7.5 mg, Oral, Daily at bedtime    cyanocobalamin (VITAMIN B-12) 100 mcg tablet Oral, Daily    diphenhydrAMINE HCl (BENADRYL ALLERGY PO)     folic acid (FOLVITE) 1 mg, Oral, Daily    gabapentin (NEURONTIN) 200 mg, Oral, 2 times daily    magnesium (MAGTAB) 84 mg, Oral, 2 times daily    metFORMIN (GLUCOPHAGE) 500 mg, Oral, 2 times daily with meals     metoprolol tartrate (LOPRESSOR) 50 mg, Oral, Every 12 hours    omeprazole (PRILOSEC) 20 mg, Oral, Daily    polyethylene glycol (MIRALAX) 17 g, Oral, Daily    potassium chloride (Klor-Con M10) 10 mEq tablet 10 mEq, Oral, Daily    prochlorperazine (COMPAZINE) 10 mg, Oral, 3 times daily PRN    senna-docusate sodium (SENOKOT S) 8.6-50 mg per tablet 2 tablets, Oral, Daily at bedtime    sodium chloride 1 g, Oral, 2 times daily with meals    sotalol (BETAPACE) 80 mg, Oral, 2 times daily    Sotorasib 960 mg, Oral, Daily    Tradjenta 5 mg, Oral, Daily    venlafaxine (EFFEXOR) 100 mg, Oral, Daily at bedtime         Medication reviewed. All orders signed. Complete list is in the paper chart.     Allergies    Allergies   Allergen Reactions    Amoxicillin Hives    Amoxicillin Rash and Hives    Cardizem [Diltiazem] Rash     Rash      Statins Myalgia     Severe muscle aching  Terrible pains    Zofran [Ondansetron] Palpitations       Labs/Diagnostics (reviewed by this provider):     I personally reviewed lab results and imaging studies. Full reports are in the paper chart.     Assessment/Plan:    Chronic diastolic heart failure (HCC)  Wt Readings from Last 3 Encounters:   06/08/24 70.5 kg (155 lb 6.4 oz)   05/21/24 69.3 kg (152 lb 12.8 oz)   05/20/24 71.4 kg (157 lb 8 oz)             Monitor daily weight  Monitor volume status clinically - seems stable, no orthopnea  Diuretic held inpatient in setting of ADITYA  Per Nephrology to do torsemide 10mg PRN weight gain of more than 3 pounds in a day or more than 5 pounds in a week.   Continue beta blocker  Follow up with PCP, Cardiology as appropriate      Paroxysmal atrial fibrillation (HCC)  Continue eliquis for AC  Continue metoprolol for rate  No acute cardiac complaints  Follow up with PCP, Cardiology as appropriate    Primary hypertension  Monitor BP - generally stable/soft around 100s-110s systolic  Pulse generally 60s-70s  Avoid hypotension  Continue regimen including metoprolol  tartrate 25mg BID, sotalol 80mg BID with hold parameters  6/10 slightly weaned metoprolol dose due to low BP    Adenocarcinoma of lung, stage 4 (HCC)  Stage IV non-small cell lung cancer (diagnosed 2016) w/ osseous metastasis s/p chemotherapy + immunotherapy + RT   No present acute/associated respiratory or pain symptoms  Continues on sotorasib  Follow up with PCP, Oncology as appropriate    Acid reflux  -stable  -recommend OOB with meals, sit upright for at least 30 minutes afterwards, avoid trigger foods  -continue to monitor  -follow up with PCP, GI as appropriate  -continue PPI    Type 2 diabetes mellitus with stage 3 chronic kidney disease, without long-term current use of insulin, unspecified whether stage 3a or 3b CKD (HCC)    Lab Results   Component Value Date    HGBA1C 7.2 (H) 05/08/2024       Monitor glucose routinely - seems around mid-100s - mid 200s  Avoid hypoglycemia  Continue metformin 500mg BID, tradjenta 5mg daily  Consider stopping metformin if GFR drops <30  Encourage lifestyle modifications including healthy diet, weight management, exercise as appropriate  Follow up with PCP, Endocrine/Ophthalmology/Podiatry outpatient as appropriate      ADITYA on CKD  Lab Results   Component Value Date    EGFR 38 06/10/2024    EGFR 43 06/08/2024    EGFR 40 06/07/2024    CREATININE 1.35 (H) 06/10/2024    CREATININE 1.23 06/08/2024    CREATININE 1.30 06/07/2024       ADITYA on CKD noted inpatient  Evaluated by Nephrology inpatient, concern for cardiorenal vs overdiuresis etiology  Diuretics held/adjusted, see above under CHF  Monitor renal function on routine labs  Avoid nephrotoxins, NSAIDs as able  Encourage PO hydration, respecting volume status  Follow up with PCP, Nephrology as appropriate      Anxiety  Noted chronic hx  Mood stable  Supportive care  Follow up with PCP, therapy/psych services outpatient as appropriate  Continue venlafaxine, buspirone    Anemia in other chronic diseases classified  elsewhere  Chronic anemia likely multifactorial in setting of CKD, metastatic cancer, chemotherapy  Microcytic, possibly some iron deficiency component, no recent iron level on file  -monitor on routine labs - seems stable  -monitor for acute bleed - no present signs  -consider further workup, Heme consult if persistent/worsening  -transfuse PRN Hb <7      Sacral wound  Noted POA  Continue local wound care, pressure offloading, wound care consult at rehab  Continue to monitor    Ambulatory dysfunction  -PT/OT  -fall precautions  -encourage appropriate DME use  -SW to follow for safe discharge planning/homecare services as appropriate      Cognitive decline  Per family, some cognitive decline noted since stroke several years ago  Possibly underlying cognitive impairment with superimposed hospitalization-related delirium at present  Supportive care  Continue to monitor  Follow up goals of care as appropriate    History of stroke  Hx of CVA in 2022  Encourage lifestyle modifications including healthy diet, weight management, exercise as appropriate  Continue aspirin, statin  Follow up with PCP, neurology as appropriate    Dyslipidemia  Continue statin    Fall  Reported mechanical fall prior to hospitalization, patient denies preceding cardiopulmonary/syncopal symptoms, reported falling backwards due to weakness, denies headstrike/LOC  Trauma workup inpatient generally unremarkable  Some residual L>R hip pain improving  MRI not tolerated inpatient, recommend follow up with Neurology outpatient  See plan under ambulatory dysfunction    Goals of care, counseling/discussion  ACP discussion as per note  DNR/DNI  POA is daughter Liz  At present goals are life-prolonging but if declining status, they would look into hospice/comfort    Hyponatremia  Intermittent on labs  Likely SIADH in setting of lung cancer  Monitor on routine labs  Eval by Nephrology inpatient, consult at rehab if needed  Continue salt tabs  Consider  further workup if worsening    At risk for constipation  At risk due to hospitalization, relative immobility, comorbidities  Monitor stool output  Bowel regimen at facility: miralax and senna as appropriate; consider bisacodyl suppository PRN if no BM in 2-3 days  Encourage mobility as tolerated, PO hydration as appropriate, high fiber diet/prune juice (in outpatient setting as appropriate)  Goal is for 1 easy BM every 1-2 days      At risk for delirium  Delirium precautions  -Patient is high risk of delirium due to age, comorbidities, hospitalization/unfamiliar environment  -delirium precautions  -maintain normal sleep/wake cycle  -minimize overnight interruptions, group overnight vitals/labs/nursing checks as possible  -dim lights, close blinds and turn off tv to minimize stimulation and encourage sleep environment in evenings  -ensure that pain is well controlled  -monitor for fecal and urinary retention which may precipitate delirium  -encourage early mobilization and ambulation  -provide frequent reorientation and redirection  -encourage family and friends at the bedside to help help calm patient if anxious  -avoid medications which may precipitate or worsen delirium such as tramadol, benzodiazepine, anticholinergics, and benadryl  -encourage hydration and nutrition   -redirect unwanted behaviors as first line, avoid physical restraints, use chemical restraint only if all other attempts have been unsuccessful      Leukemoid reaction  Patient reportedly met SIRS criteria inpatient likely related to UA concerning for infection although no apparent culture done  Treated UTI with ceftriaxone; 3 day course completed on 6/2 as per hospital record  Monitor for acute/recurrent infection, no present signs  Monitor WBC on routine labs, consider further workup if persistent/worsening       Pain: none acute; mild/tolerable at hips  Rehab Potential:Fair  Patient Informed of Medical Condition: yes   Patient is Capable of  "Understanding Their Right: patient appears to have impaired competency to make her own decisions at this time in setting of forgetfulness and seemingly somewhat pleasantly confused; she does converse appropriately generally and appears to be able to comprehend our discussion, but unclear status of her judgment  Discharge Plan: home pending clinical course  Vaccination:   Immunization History   Administered Date(s) Administered    COVID-19 PFIZER VACCINE 0.3 ML IM 02/20/2021, 03/12/2021, 11/22/2021    COVID-19 Pfizer Vac BIVALENT Haile-sucrose 12 Yr+ IM 09/15/2022    COVID-19 Pfizer vac (Haile-sucrose, gray cap) 12 yr+ IM 04/23/2022    H1N1, All Formulations 11/05/2009    INFLUENZA 10/27/2014, 09/21/2015, 10/19/2016, 10/02/2017, 10/10/2018, 09/26/2019, 08/26/2020, 12/09/2021, 10/17/2022    Influenza, high dose seasonal 0.7 mL 11/14/2023    Influenza, seasonal, injectable 10/27/2014, 10/19/2016, 10/02/2017    Pneumococcal Conjugate 13-Valent 12/03/2015    Pneumococcal Conjugate Vaccine 20-valent (Pcv20), Polysace 11/15/2022    Pneumococcal Polysaccharide PPV23 09/26/2014    Tdap 10/06/2018    Zoster 10/03/2013    influenza, trivalent, adjuvanted 08/26/2019       DVT ppx: eliquis    Advanced Directives: patient verbalizes family members including children Liz and Rito and brother Patel are people she trusts. Daughter Liz noted in chart as POA and affirms she is POA.  Code status:DNR (Per discussion with resident or POA) Patient appearing with limited capacity/judgment but sufficient to comprehend code status discussion and she very clearly is able to state she wishes to not have any aggressive measures or resuscitation, stating \"leave me alone\" if her heart were to stop. Alli Hill per separate discussion today corroborates this and as POA affirms patient is DNR/DNI.    PCP: Reymundo      -Total time spent on this encounter today including documentation and workup review, face to face time, history and exam, " and documentation/orders was approximately 70 minutes.  -This note will be copied to [facility] EMR where vitals and medication orders are placed.    Tashi Lilly D.O.  Geriatric Medicine  6/10/2024 2:28 PM

## 2024-06-10 NOTE — ASSESSMENT & PLAN NOTE
Delirium precautions  -Patient is high risk of delirium due to age, comorbidities, hospitalization/unfamiliar environment  -delirium precautions  -maintain normal sleep/wake cycle  -minimize overnight interruptions, group overnight vitals/labs/nursing checks as possible  -dim lights, close blinds and turn off tv to minimize stimulation and encourage sleep environment in evenings  -ensure that pain is well controlled  -monitor for fecal and urinary retention which may precipitate delirium  -encourage early mobilization and ambulation  -provide frequent reorientation and redirection  -encourage family and friends at the bedside to help help calm patient if anxious  -avoid medications which may precipitate or worsen delirium such as tramadol, benzodiazepine, anticholinergics, and benadryl  -encourage hydration and nutrition   -redirect unwanted behaviors as first line, avoid physical restraints, use chemical restraint only if all other attempts have been unsuccessful

## 2024-06-10 NOTE — ASSESSMENT & PLAN NOTE
Hx of CVA in 2022  Encourage lifestyle modifications including healthy diet, weight management, exercise as appropriate  Continue aspirin, statin  Follow up with PCP, neurology as appropriate

## 2024-06-10 NOTE — ASSESSMENT & PLAN NOTE
Lab Results   Component Value Date    EGFR 38 06/10/2024    EGFR 43 06/08/2024    EGFR 40 06/07/2024    CREATININE 1.35 (H) 06/10/2024    CREATININE 1.23 06/08/2024    CREATININE 1.30 06/07/2024       ADITYA on CKD noted inpatient  Evaluated by Nephrology inpatient, concern for cardiorenal vs overdiuresis etiology  Diuretics held/adjusted, see above under CHF  Monitor renal function on routine labs  Avoid nephrotoxins, NSAIDs as able  Encourage PO hydration, respecting volume status  Follow up with PCP, Nephrology as appropriate

## 2024-06-10 NOTE — ASSESSMENT & PLAN NOTE
Noted chronic hx  Mood stable  Supportive care  Follow up with PCP, therapy/psych services outpatient as appropriate  Continue venlafaxine, buspirone

## 2024-06-10 NOTE — ASSESSMENT & PLAN NOTE
Per family, some cognitive decline noted since stroke several years ago  Possibly underlying cognitive impairment with superimposed hospitalization-related delirium at present  Supportive care  Continue to monitor  Follow up goals of care as appropriate

## 2024-06-10 NOTE — ASSESSMENT & PLAN NOTE
Continue eliquis for AC  Continue metoprolol for rate  No acute cardiac complaints  Follow up with PCP, Cardiology as appropriate

## 2024-06-10 NOTE — ASSESSMENT & PLAN NOTE
Stage IV non-small cell lung cancer (diagnosed 2016) w/ osseous metastasis s/p chemotherapy + immunotherapy + RT   No present acute/associated respiratory or pain symptoms  Continues on sotorasib  Follow up with PCP, Oncology as appropriate

## 2024-06-10 NOTE — ASSESSMENT & PLAN NOTE
Wt Readings from Last 3 Encounters:   06/08/24 70.5 kg (155 lb 6.4 oz)   05/21/24 69.3 kg (152 lb 12.8 oz)   05/20/24 71.4 kg (157 lb 8 oz)             Monitor daily weight  Monitor volume status clinically - seems stable, no orthopnea  Diuretic held inpatient in setting of ADITYA  Per Nephrology to do torsemide 10mg PRN weight gain of more than 3 pounds in a day or more than 5 pounds in a week.   Continue beta blocker  Follow up with PCP, Cardiology as appropriate

## 2024-06-10 NOTE — ASSESSMENT & PLAN NOTE
Reported mechanical fall prior to hospitalization, patient denies preceding cardiopulmonary/syncopal symptoms, reported falling backwards due to weakness, denies headstrike/LOC  Trauma workup inpatient generally unremarkable  Some residual L>R hip pain improving  MRI not tolerated inpatient, recommend follow up with Neurology outpatient  See plan under ambulatory dysfunction

## 2024-06-10 NOTE — ASSESSMENT & PLAN NOTE
Noted POA  Continue local wound care, pressure offloading, wound care consult at rehab  Continue to monitor

## 2024-06-10 NOTE — ASSESSMENT & PLAN NOTE
Patient reportedly met SIRS criteria inpatient likely related to UA concerning for infection although no apparent culture done  Treated UTI with ceftriaxone; 3 day course completed on 6/2 as per hospital record  Monitor for acute/recurrent infection, no present signs  Monitor WBC on routine labs, consider further workup if persistent/worsening

## 2024-06-10 NOTE — ASSESSMENT & PLAN NOTE
ACP discussion as per note  DNR/DNI  POA is [place]  At present goals are life-prolonging but if declining status, they would look into hospice/comfort

## 2024-06-10 NOTE — ASSESSMENT & PLAN NOTE
Chronic anemia likely multifactorial in setting of CKD, metastatic cancer, chemotherapy  Microcytic, possibly some iron deficiency component, no recent iron level on file  -monitor on routine labs - seems stable  -monitor for acute bleed - no present signs  -consider further workup, Heme consult if persistent/worsening  -transfuse PRN Hb <7

## 2024-06-10 NOTE — ASSESSMENT & PLAN NOTE
Monitor BP - generally stable/soft around 100s-110s systolic  Pulse generally 60s-70s  Avoid hypotension  Continue regimen including metoprolol tartrate 25mg BID, sotalol 80mg BID with hold parameters  6/10 slightly weaned metoprolol dose due to low BP

## 2024-06-10 NOTE — ASSESSMENT & PLAN NOTE
Intermittent on labs  Likely SIADH in setting of lung cancer  Monitor on routine labs  Eval by Nephrology inpatient, consult at rehab if needed  Continue salt tabs  Consider further workup if worsening

## 2024-06-10 NOTE — ASSESSMENT & PLAN NOTE
Lab Results   Component Value Date    HGBA1C 7.2 (H) 05/08/2024       Monitor glucose routinely - seems around mid-100s - mid 200s  Avoid hypoglycemia  Continue metformin 500mg BID, tradjenta 5mg daily  Consider stopping metformin if GFR drops <30  Encourage lifestyle modifications including healthy diet, weight management, exercise as appropriate  Follow up with PCP, Endocrine/Ophthalmology/Podiatry outpatient as appropriate

## 2024-06-11 ENCOUNTER — PATIENT OUTREACH (OUTPATIENT)
Dept: CASE MANAGEMENT | Facility: OTHER | Age: 74
End: 2024-06-11

## 2024-06-11 NOTE — PROGRESS NOTES
Outpatient Care Management ADITYA/SNF Pathway. Discharged 6/8/24 to [facility]. Email sent to facility to inform them the patient is on the ADITYA Pathway and I will be following them during their skilled stay.  This Admin Coordinator will continue to monitor via chart review.

## 2024-06-12 NOTE — WOUND OSTOMY CARE
Consult Note - Wound   Melany Griffith 73 y.o. female MRN: [id number]  Unit/Bed#: [facility] 555-02 Encounter: [id number]      History and Present Illness:Patient is a 72 yo female that was admitted to [facility] for rehab.  Patient has a PMH of  stage IV lung adenocarcinoma s/p chemo, radiation, immunotherapy, currently on Sotorasib, paroxysmal afib on eliquis, HFrEF, DM2, CVA , and HTN . She was at [facility] for ADITYA on CKD . . Patient is alert and oriented times three and agreeable to assessment.She is a Max A of 2 to stand . Placed on a EHOB cushion .          Assessment Findings:   Right buttocks - stage 2 area 0.7 x 0.3 x 0.1 blanchable 50% pink and 50%  white . No drainage noted - Calazime bid and prn   Will place orders and prevention orders       Skin care plans:  1- Wash Sacro Buttocks area with soap and water. Pat Dry. Apply Calazime to right buttocks BID and PRN.  2-Hydraguard to left buttock, b/l hips, b/l heels BID and PRN.  3-Elevate heels to offload pressure.  4-Ehob cushion when out of bed.  5-Turn/reposition q2h or when medically stable for pressure re-distribution on skin.  6-Moisturize skin daily with skin nourishing cream          Picture below is from acute admission the area remains the same a stage 2 area with todays assessment     Wound 05/29/24 Pressure Injury Buttocks Right (Active)   Wound Image   06/06/24 1147   Wound Description Fragile;Pink 06/12/24 1800   Pressure Injury Stage 2 06/12/24 1800   Beth-wound Assessment Clean;Dry;Intact 06/12/24 1800   Wound Length (cm) 0.7 cm 06/12/24 1800   Wound Width (cm) 0.3 cm 06/12/24 1800   Wound Depth (cm) 0.1 cm 06/12/24 1800   Wound Surface Area (cm^2) 0.21 cm^2 06/12/24 1800   Wound Volume (cm^3) 0.021 cm^3 06/12/24 1800   Calculated Wound Volume (cm^3) 0.02 cm^3 06/12/24 1800   Change in Wound Size % 33.33 06/12/24 1800   Drainage Amount None 06/12/24 1800   Non-staged Wound Description Partial thickness 06/06/24 1147   Treatments Cleansed;Site care  06/12/24 1800   Dressing Protective barrier 06/12/24 1800   Wound packed? No 06/06/24 1147   Packing- # removed 0 06/06/24 1147   Packing- # inserted 0 06/06/24 1147   Dressing Changed New 06/02/24 2000   Patient Tolerance Tolerated well 06/12/24 1800   Dressing Status Clean;Dry;Intact 06/06/24 2100       Wound care  will  follow weekly call or tiger text with questions     Elisabeth Gudino BSN CWOCN

## 2024-06-13 ENCOUNTER — NURSING HOME VISIT (OUTPATIENT)
Dept: GERIATRICS | Facility: OTHER | Age: 74
End: 2024-06-13
Payer: MEDICARE

## 2024-06-13 DIAGNOSIS — E11.22 TYPE 2 DIABETES MELLITUS WITH STAGE 3 CHRONIC KIDNEY DISEASE, WITHOUT LONG-TERM CURRENT USE OF INSULIN, UNSPECIFIED WHETHER STAGE 3A OR 3B CKD (HCC): ICD-10-CM

## 2024-06-13 DIAGNOSIS — N18.4 STAGE 4 CHRONIC KIDNEY DISEASE (HCC): Primary | ICD-10-CM

## 2024-06-13 DIAGNOSIS — I50.32 CHRONIC DIASTOLIC HEART FAILURE (HCC): ICD-10-CM

## 2024-06-13 DIAGNOSIS — I10 PRIMARY HYPERTENSION: ICD-10-CM

## 2024-06-13 DIAGNOSIS — Z71.89 GOALS OF CARE, COUNSELING/DISCUSSION: ICD-10-CM

## 2024-06-13 DIAGNOSIS — R79.89 ELEVATED LFTS: ICD-10-CM

## 2024-06-13 DIAGNOSIS — R41.89 COGNITIVE DECLINE: ICD-10-CM

## 2024-06-13 DIAGNOSIS — D72.823 LEUKEMOID REACTION: ICD-10-CM

## 2024-06-13 DIAGNOSIS — N18.30 TYPE 2 DIABETES MELLITUS WITH STAGE 3 CHRONIC KIDNEY DISEASE, WITHOUT LONG-TERM CURRENT USE OF INSULIN, UNSPECIFIED WHETHER STAGE 3A OR 3B CKD (HCC): ICD-10-CM

## 2024-06-13 PROCEDURE — 99310 SBSQ NF CARE HIGH MDM 45: CPT | Performed by: STUDENT IN AN ORGANIZED HEALTH CARE EDUCATION/TRAINING PROGRAM

## 2024-06-13 NOTE — ASSESSMENT & PLAN NOTE
Wt Readings from Last 3 Encounters:   06/08/24 70.5 kg (155 lb 6.4 oz)   05/21/24 69.3 kg (152 lb 12.8 oz)   05/20/24 71.4 kg (157 lb 8 oz)         strike-out   6/13/2024 05:57 154.6 Lbs Bed dhoward (Manual)    strike-out   6/12/2024 05:17 156.7 Lbs Bed amac (Manual)    strike-out   6/11/2024 05:22 153.1 Lbs Bed dhoward (Manual)    strike-out   6/9/2024 06:29 154.0 Lbs Bed ldiaz (Manual)    strike-out   6/8/2024 13:50 149.1 Lbs Bed jcarr (Manual)        Monitor daily weight - seems fairly stable, no alarming uptrend  Monitor volume status clinically - peripheral edema seems overall stable, no orthopnea  Diuretic held inpatient in setting of ADITYA  Per inpatient Nephrology: to do torsemide 10mg daily as needed PRN weight gain of more than 3 pounds in a day or more than 5 pounds in a week.   Continue beta blocker  Follow up with PCP, Cardiology as appropriate

## 2024-06-13 NOTE — ASSESSMENT & PLAN NOTE
Patient reportedly met SIRS criteria inpatient likely related to UA concerning for infection although no apparent culture done  Treated UTI with ceftriaxone; 3 day course completed on 6/2 as per hospital record  Monitor for acute/recurrent infection - no present signs  Monitor WBC on routine labs - seems fairly stable, continues to be elevated, will get UA with reflex culture and also blood culture as unclear etiology and no recent cultures  consider further workup if persistent/worsening and if in line with goals of care

## 2024-06-13 NOTE — ASSESSMENT & PLAN NOTE
Lab Results   Component Value Date    HGBA1C 7.2 (H) 05/08/2024     Monitor glucose routinely - seems around mid-100s recently  Avoid hypoglycemia  Continue metformin 500mg BID, tradjenta 5mg daily  Consider stopping metformin if GFR drops <30  Encourage lifestyle modifications including healthy diet, weight management, exercise as appropriate  Follow up with PCP, Endocrine/Ophthalmology/Podiatry outpatient as appropriate

## 2024-06-13 NOTE — PROGRESS NOTES
[facility]  Facility: [facility]    PROGRESS NOTE  Nursing Home Place of Service: nursing home place of service: POS 31 Skilled Care-Part A Coverage    NAME: Melany Griffith  : 1950 AGE: 73 y.o. SEX: female MRN: [id number]  DATE OF ENCOUNTER: 2024    Assessment and Plan     Problem List Items Addressed This Visit       Primary hypertension     Monitor BP - generally stable/soft around 90s-120s systolic  Pulse generally within normal range, varies around 60s-100  Avoid hypotension  Continue regimen including metoprolol tartrate 25mg BID, sotalol 80mg BID with hold parameters  6/10 slightly weaned metoprolol dose due to low BP         Chronic diastolic heart failure (HCC)     Wt Readings from Last 3 Encounters:   24 70.5 kg (155 lb 6.4 oz)   24 69.3 kg (152 lb 12.8 oz)   24 71.4 kg (157 lb 8 oz)         strike-out   2024 05:57 154.6 Lbs Bed dhoward (Manual)    strike-out   2024 05:17 156.7 Lbs Bed amac (Manual)    strike-out   2024 05:22 153.1 Lbs Bed dhoward (Manual)    strike-out   2024 06:29 154.0 Lbs Bed ldiaz (Manual)    strike-out   2024 13:50 149.1 Lbs Bed jcarr (Manual)        Monitor daily weight - seems fairly stable, no alarming uptrend  Monitor volume status clinically - peripheral edema seems overall stable, no orthopnea  Diuretic held inpatient in setting of ADITYA  Per inpatient Nephrology: to do torsemide 10mg daily as needed PRN weight gain of more than 3 pounds in a day or more than 5 pounds in a week.   Continue beta blocker  Follow up with PCP, Cardiology as appropriate         Type 2 diabetes mellitus with stage 3 chronic kidney disease, without long-term current use of insulin, unspecified whether stage 3a or 3b CKD (HCC)       Lab Results   Component Value Date    HGBA1C 7.2 (H) 2024     Monitor glucose routinely - seems around mid-100s recently  Avoid hypoglycemia  Continue metformin 500mg BID,  tradjenta 5mg daily  Consider stopping metformin if GFR drops <30  Encourage lifestyle modifications including healthy diet, weight management, exercise as appropriate  Follow up with PCP, Endocrine/Ophthalmology/Podiatry outpatient as appropriate         Goals of care, counseling/discussion     Updated ACP discussion as per note today  Goals trending towards hospice  Will continue to follow closely for patient/family support as goals/hospice status are formalized, anticipated by next week.         Cognitive decline     Per family, some cognitive decline noted since stroke several years ago  Likely underlying cognitive impairment with superimposed hospitalization-related delirium at present  MoCA done at rehab per therapy team score of 12/30  Supportive care  Continue to monitor  Follow up goals of care as appropriate         ADITYA on CKD - Primary     Lab Results   Component Value Date    EGFR 30 06/13/2024    EGFR 38 06/10/2024    EGFR 43 06/08/2024    CREATININE 1.63 (H) 06/13/2024    CREATININE 1.35 (H) 06/10/2024    CREATININE 1.23 06/08/2024       ADITYA on CKD noted inpatient  Evaluated by Nephrology inpatient, concern for cardiorenal vs overdiuresis etiology  Diuretics held/adjusted, see above under CHF, presently on torsemide 10mg daily PRN only  Monitor renal function on routine labs - as of 6/13 with worsening again  Nephrology consulted at rehab. I discussed case with Nephrology 6/13. Recommendations appreciated. Will follow up labs and check bladder scan, UA, and urine studies including chloride, sodium, creatinine, urea.  Would consider holding/stopping medications including metformin and her lung cancer med especially if she is transitioning to hospice.  Avoid nephrotoxins, NSAIDs as able  Encourage PO hydration, respecting volume status  Follow up with PCP, Nephrology as appropriate         Leukemoid reaction     Patient reportedly met SIRS criteria inpatient likely related to UA concerning for infection  although no apparent culture done  Treated UTI with ceftriaxone; 3 day course completed on 6/2 as per hospital record  Monitor for acute/recurrent infection - no present signs  Monitor WBC on routine labs - seems fairly stable, continues to be elevated, will get UA with reflex culture and also blood culture as unclear etiology and no recent cultures  consider further workup if persistent/worsening and if in line with goals of care         Elevated LFTs     Elevation of LFTs including AST/ALT and in particular significant alk phos elevation on recent labs (bilirubin remained WNL)  No clear clinical correlation; no appreciable acute abdominal pain/tenderness nor jaundice  Possibly related to chemotherapy medication? Would consider stopping especially as patient transitioning to hospice in the current trajectory  Will follow up on labs  Will obtain abdominal ultrasound to evaluate for acute pathology to begin with  Consider further workup/intervention depending on above results and on goals of care                Chief Complaint     Follow up ADITYA, weakness    History of Present Illness     Melany Griffith is a 73 y.o. female who was seen today for follow up. PMH including Afib, HTN, lung cancer, hyponatremia, CHF, DM2, CKD, GERD, depression/anxiety, anemia, CVA     Patient seen and examined in room after having used commode and being transferred back to chair by staff  Others present: none  Patient seated in chair  Appears comfortable, awake, alert, oriented to situation, able to converse appropriately  Patient polite, Aox2-3 with some forgetfulness apparent (similar to on admission), in fair spirits. Notes she is feeling OK with no acute concerns, ; initially states she feels better overall but then clarifies that overall she still feels quite weak generally. Denies any new/acute symptoms. Does not feel acutely sick, feverish, or confused. No acute pain at present anywhere. No CP/palpitations/SOB/orthopnea. Feels  "peripheral edema baseline/stable if not improved subjectively (questionable as it appears similar to prior on exam). Reports good appetite and denies swallowing concerns. Last BM today, no abd pain/N/V. Urinating without acute symptoms including dysuria/hematuria. No acute cardiopulmonary, abdominal, or urinary symptoms; see ROS for more details.     No further questions or acute concerns identified.    Subsequently I spoke to Nephrology Dr. Del Real as I had consulted Nephrology on patient's case today. Recommendations appreciated, see Plan.    Subsequently I spoke to Marimar from therapy team who worked with patient today. Rehab team has a patient care conference today involving patient's daughter. At this time per care conference, family is seeking a transition to home hospice in setting of metastatic cancer/failure to thrive, as I had discussed with daughter on patient's admission and as had been discussed when she was hospitalized as well. Per therapy team the plan for now is that daughter will come to unit on Monday for some training and patient expected to be discharged for home hospice early next week, but at present is not officially on comfort measures/hospice as yet therefore we will continue medical management until status is officially changed. I did return to patient's room and discussed with her as well; although she displays forgetfulness she appears to have sufficient insight to comprehend this conversation and expresses she is aware of and amenable to hospice plan including understanding that this means stopping medications/treatments such as her cancer medication that could contribute to discomfort.    Lab Review:   6/10: BMP with Na 132 (stable/improved, recent low 127, seems intermittent on review), Cr 1.35 (recent peak 1.57 on 6/6, baseline seems 1.3-1.6), GFR 38 (baseline seems 30s-40s); CBC 6/8 with WBC 14.17 (recent peak 16.83 on 5/28), Hb 10.0 (microcytic, baseline around 9-11); UPEP 6/2 \"No " "monoclonal bands noted\"; SPEP 6/3 \"Serum immunofixation shows no monoclonal immunoglobulins\"; UA 5/30 with leuks/bacteria (no culture appears done); recent TSH WNL; recent A1c 7.2  6/13: CMP with Na 132 (stable), Cr 1.63, GFR 30, AST 97, ALT 64, alk phos 638 (significantly elevated); CBC with WBC 15.04, Hb 11.2, plt WNL           EKG 4/29/24: Sinus tachycardia with Premature atrial complexes, LAD, 101bpm, Qtc 477  Echo Jan 2022: EF 60, grade 1 diastolic dysfunction       The following portions of the patient's history were reviewed and updated as appropriate: allergies, current medications, past family history, past medical history, past social history, past surgical history and problem list.    Review of Systems     Review of Systems   Constitutional:  Negative for appetite change, chills, diaphoresis and fever.   HENT:  Negative for drooling, ear pain, hearing loss, rhinorrhea, sore throat and trouble swallowing.    Eyes:  Negative for pain, discharge, redness, itching and visual disturbance.   Respiratory:  Negative for cough, chest tightness, shortness of breath and wheezing.    Cardiovascular:  Positive for leg swelling (chronic). Negative for chest pain and palpitations.   Gastrointestinal:  Negative for abdominal pain, blood in stool, constipation, diarrhea, nausea and vomiting.   Genitourinary:  Negative for difficulty urinating, dysuria, flank pain, hematuria and urgency.   Musculoskeletal:  Positive for gait problem. Negative for arthralgias, back pain and neck pain.   Skin:  Positive for wound (sacral). Negative for color change.   Neurological:  Positive for weakness (ongoing). Negative for dizziness, facial asymmetry, speech difficulty, light-headedness and headaches.   Psychiatric/Behavioral:  Negative for agitation, behavioral problems and confusion. The patient is not nervous/anxious and is not hyperactive.    All other systems reviewed and are negative.      Active Problem List     Patient Active " Problem List   Diagnosis    Paroxysmal atrial fibrillation (HCC)    Primary hypertension    Adenocarcinoma of lung, stage 4 (HCC)    Hyponatremia    Chronic diastolic heart failure (HCC)    Cancer related pain    Type 2 diabetes mellitus with stage 3 chronic kidney disease, without long-term current use of insulin, unspecified whether stage 3a or 3b CKD (HCC)    Malignant neoplasm of bone with metastases (HCC)    Acid reflux    Current episode of major depressive disorder without prior episode    Vitamin D deficiency    Adhesive capsulitis of shoulder    Anxiety    Goals of care, counseling/discussion    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)    Neoplastic malignant related fatigue    CINV (chemotherapy-induced nausea and vomiting)    Cyst of pancreas    Pneumonitis    Anemia in other chronic diseases classified elsewhere    Palliative care patient    Hypercalcemia    Chemotherapy induced neutropenia (HCC)    Diarrhea    Former smoker    Lung cancer (HCC)    Hypomagnesemia    History of stroke    GERD (gastroesophageal reflux disease)    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HCC)    Dyslipidemia    Lower extremity edema    Cognitive decline    Anemia due to antineoplastic chemotherapy    Acquired trigger finger of right ring finger    ADITYA on CKD    Constipation    Bowel trouble    Joint pain    Gait abnormality    Port-A-Cath in place    Neck muscle spasm    Shoulder pain    Microcytic anemia    Nausea    Fall    Ambulatory dysfunction    Acute pain of right shoulder    Sacral wound    Severe protein-calorie malnutrition (HCC)    Palliative care encounter    At risk for constipation    At risk for delirium    Leukemoid reaction    Elevated LFTs       Objective     Vital Signs:     BP: 128/67 mmHg  6/13/2024 08:07   Temp:96.7 °F  6/13/2024 08:07 Pulse:95 bpm  6/13/2024 08:07 Weight:154.6 Lbs  6/13/2024 05:57   Resp:18 Breaths/min  6/13/2024 08:07 BS:161 mg/dL  6/13/2024 06:07 O2:100  "%  6/13/2024 08:08 Pain:0  6/13/2024 08:22       Physical Exam  Vitals reviewed.   Constitutional:       General: She is not in acute distress.     Appearance: She is not toxic-appearing or diaphoretic.   HENT:      Head: Normocephalic and atraumatic.      Right Ear: External ear normal.      Left Ear: External ear normal.      Nose: Nose normal. No rhinorrhea.      Mouth/Throat:      Mouth: Mucous membranes are dry.      Pharynx: Oropharynx is clear. No posterior oropharyngeal erythema.   Eyes:      General: No scleral icterus.        Right eye: No discharge.         Left eye: No discharge.      Extraocular Movements: Extraocular movements intact.      Conjunctiva/sclera: Conjunctivae normal.      Pupils: Pupils are equal, round, and reactive to light.   Cardiovascular:      Rate and Rhythm: Normal rate and regular rhythm.   Pulmonary:      Effort: Pulmonary effort is normal. No respiratory distress.      Breath sounds: Normal breath sounds. No wheezing or rales.   Abdominal:      General: Bowel sounds are normal. There is no distension.      Palpations: Abdomen is soft.      Tenderness: There is no abdominal tenderness. There is no right CVA tenderness, left CVA tenderness or guarding.   Musculoskeletal:         General: Swelling present. No tenderness.      Cervical back: No rigidity.      Comments: 1-2+ bilateral lower extremity edema   Skin:     General: Skin is warm and dry.      Coloration: Skin is not jaundiced.   Neurological:      General: No focal deficit present.      Mental Status: She is alert. Mental status is at baseline.      Comments: Aox2-3 (to self, nearly to location \"[facility]\" but thinks she is at [facility], and is oriented to month Meag[place]  Some forgetfulness apparent   Psychiatric:         Mood and Affect: Mood normal.         Behavior: Behavior normal.         Thought Content: Thought content normal.         Pertinent Laboratory/Diagnostic Studies:  Laboratory and Imaging studies " reviewed. Full report in the paper chart.     Current Medications   Medications reviewed and updated in facility chart.      -Total time spent on this encounter today including documentation and workup review, face to face time, history and exam, and documentation/orders and coordination with Nephrology and with therapy team discussion was approximately 55 minutes.  -This note will be copied to [facility] EMR where vitals and medication orders are placed.    Tashi Lilly D.O.  Geriatric Medicine  6/13/2024 3:18 PM

## 2024-06-13 NOTE — ASSESSMENT & PLAN NOTE
Lab Results   Component Value Date    EGFR 30 06/13/2024    EGFR 38 06/10/2024    EGFR 43 06/08/2024    CREATININE 1.63 (H) 06/13/2024    CREATININE 1.35 (H) 06/10/2024    CREATININE 1.23 06/08/2024       ADITYA on CKD noted inpatient  Evaluated by Nephrology inpatient, concern for cardiorenal vs overdiuresis etiology  Diuretics held/adjusted, see above under CHF, presently on torsemide 10mg daily PRN only  Monitor renal function on routine labs - as of 6/13 with worsening again  Nephrology consulted at rehab. I discussed case with Nephrology 6/13. Recommendations appreciated. Will follow up labs and check bladder scan, UA, and urine studies including chloride, sodium, creatinine, urea.  Would consider holding/stopping medications including metformin and her lung cancer med especially if she is transitioning to hospice.  Avoid nephrotoxins, NSAIDs as able  Encourage PO hydration, respecting volume status  Follow up with PCP, Nephrology as appropriate

## 2024-06-13 NOTE — QUICK NOTE
Nephrology has been reconsulted for rise in serum creatinine of unclear cause.  Have discussed the case with the primary attending who states that the patient feels weak but has had a bowel movement and is urinating normally.  Blood pressure 120s over 60s.  Weight is stable.  The patient orders include torsemide 10 mg as needed but the patient has not received any diuretic once transferred from [place].    For preliminary ADITYA workup I recommend urinalysis microscopy, urine chloride, urine sodium, urine creatinine, urine urea as well as bladder scan to ensure no retention is playing a role.  I recommend follow-up BMP in the morning.  As patient's alk phos elevated, primary team to obtain right upper quadrant ultrasound as well.  I do wonder if the patient's chemotherapy medication is playing a role in the higher serum creatinine level.  May need to discuss with hematology.    Nephrology to see formally in the morning tomorrow.

## 2024-06-13 NOTE — ASSESSMENT & PLAN NOTE
Monitor BP - generally stable/soft around 90s-120s systolic  Pulse generally within normal range, varies around 60s-100  Avoid hypotension  Continue regimen including metoprolol tartrate 25mg BID, sotalol 80mg BID with hold parameters  6/10 slightly weaned metoprolol dose due to low BP

## 2024-06-13 NOTE — ASSESSMENT & PLAN NOTE
Elevation of LFTs including AST/ALT and in particular significant alk phos elevation on recent labs (bilirubin remained WNL)  No clear clinical correlation; no appreciable acute abdominal pain/tenderness nor jaundice  Possibly related to chemotherapy medication? Would consider stopping especially as patient transitioning to hospice in the current trajectory  Will follow up on labs  Will obtain abdominal ultrasound to evaluate for acute pathology to begin with  Consider further workup/intervention depending on above results and on goals of care

## 2024-06-13 NOTE — ASSESSMENT & PLAN NOTE
Per family, some cognitive decline noted since stroke several years ago  Likely underlying cognitive impairment with superimposed hospitalization-related delirium at present  MoCA done at rehab per therapy team score of 12/30  Supportive care  Continue to monitor  Follow up goals of care as appropriate

## 2024-06-13 NOTE — ASSESSMENT & PLAN NOTE
Updated ACP discussion as per note today  Goals trending towards hospice  Will continue to follow closely for patient/family support as goals/hospice status are formalized, anticipated by next week.

## 2024-06-14 ENCOUNTER — APPOINTMENT (OUTPATIENT)
Dept: ULTRASOUND IMAGING | Facility: HOSPITAL | Age: 74
End: 2024-06-14
Payer: MEDICARE

## 2024-06-14 ENCOUNTER — PATIENT OUTREACH (OUTPATIENT)
Dept: CASE MANAGEMENT | Facility: OTHER | Age: 74
End: 2024-06-14

## 2024-06-14 PROCEDURE — 76700 US EXAM ABDOM COMPLETE: CPT

## 2024-06-14 NOTE — PROGRESS NOTES
Progress Note - Nephrology   Melany Griffith 73 y.o. female MRN: [id number]  Unit/Bed#: [facility] 555-02 Encounter: [id number]      Assessment / Plan:  ADITYA, recurrent, admit sCr initially 1.57, down to 1.16, up to 1.57, and now 1.9 today  -renal u/s negative for obstruction/hydro  -ADITYA previously thought to be due to CRS, now likely prerenal due to decreased oral intake and rising sCr as well as decreasing weight, recommend providing 500ml bicarbonate bolus over 10 hours and repeat BMP in am  -I have discussed the above plan with the primary provider who agrees to providing IVF and repeating am BMP  -f/u UA and urine lytes  -173ml noted on bladder scan  -f/u am BMP  -d/c potassium supplementation as no longer on diuretic daily and high normal K noted today  -would provide further IVF/albumin bolus if SBP drops.  Hyponatremia - sNa stable, sNa 133 today, monitor s/p IVF  -myeloma work up negative  -has previously received tolvaptan x 2, suspect SIADH in setting of buspar/venlafaxine use vs sotorasib as the cause  -remains on 1g BID salt tabs  Elevated anion gap acidosis - check lactate, provide bicarbonate bolus 500ml over 10 hours  Elevated alk phos - f/u RUQ U/S today, work up per primary team  Mild anemia - Hgb 11.2, monitor CBC, Hgb uptrending  Chronic diastolic CHF, EF 60% and grade 1 diastolic dysfunction - monitor daily weight, monitor volume status s/p IVF as above, weight has decreased with rising sCr, concern for prerenal ADITYA, on torsemide 10mg prn for weight gain but has not received this yet on [facility]  Lung adenocarcinoma - s/p chemo and immunotherapy, on sotorasib, wonder if this medication is contributing to rising sCr, discuss with hematology, goals of care discussion ongoing        Subjective:   Unable to elicit ROS as patient lethargic.      Objective:     Vitals:/72, T 95.7F, Pulse 90, weight 152.9lb, RR 18      Weight (last 2 days)       None            No intake or output data in the 24 hours  ending 06/14/24 1006  No intake/output data recorded.        Physical Exam:   Physical Exam  Vitals and nursing note reviewed.   Constitutional:       General: She is not in acute distress.     Appearance: She is well-developed. She is ill-appearing. She is not diaphoretic.   HENT:      Head: Normocephalic and atraumatic.      Nose: Nose normal.      Mouth/Throat:      Mouth: Mucous membranes are dry.      Pharynx: No oropharyngeal exudate.   Eyes:      General: No scleral icterus.        Right eye: No discharge.         Left eye: No discharge.   Neck:      Thyroid: No thyromegaly.   Cardiovascular:      Rate and Rhythm: Normal rate and regular rhythm.      Heart sounds: No murmur heard.  Pulmonary:      Effort: Pulmonary effort is normal. No respiratory distress.      Breath sounds: Normal breath sounds. No wheezing.   Abdominal:      General: Bowel sounds are normal. There is no distension.      Palpations: Abdomen is soft.   Musculoskeletal:         General: Swelling (b/l LE) present.      Cervical back: Normal range of motion and neck supple.   Skin:     General: Skin is warm and dry.      Coloration: Skin is pale.      Findings: No rash.   Neurological:      Motor: No abnormal muscle tone.      Comments: lethargic         Invasive Devices       Central Venous Catheter Line  Duration             Port A Cath 12/14/21 Right Chest 912 days              Peripheral Intravenous Line  Duration             Peripheral IV 06/02/24 Right Antecubital 11 days    Peripheral IV 06/07/24 Left;Ventral (anterior) Forearm 6 days                    Medications:    Scheduled Meds:tylenol 1g TID Prn  Apixapan  Aspirin 81mg   Atorvastatin 40mg dialy  Buspar 7.5mg daily at bedtime  Cyanocobalamin  Folate  Gabapentin 200mg twice daily  Magnesium lactate  Metformin 500mg BID  Metoprolol 25mg po BID  Omeprazole 20mg daily  Potassium chloride 10meq po daily  Prochlorperazine maleate 10mg  TID   Senna-docusate prn  Sodium chloride 1g po  "BID  Sotalol 80mg po BID  Sotorasib 960mg daily   Torsemide 10mg prn for > 3lb weight gain in 1 day or > 5lb in 1 week  Tradjenta   Venlafaxine 100mg daily      Continuous Infusions:No current facility-administered medications for this encounter.          LAB RESULTS:      Results from last 7 days   Lab Units 06/14/24  0500 06/13/24  0712 06/10/24  0802 06/08/24  0427   WBC Thousand/uL  --  15.04*  --  14.17*   HEMOGLOBIN g/dL  --  11.2*  --  10.0*   HEMATOCRIT %  --  36.9  --  34.5*   PLATELETS Thousands/uL  --  389  --  373   POTASSIUM mmol/L 4.9 4.2 4.0 3.6   CHLORIDE mmol/L 96 95* 93* 92*   CO2 mmol/L 19* 21 25 27   BUN mg/dL 60* 49* 38* 31*   CREATININE mg/dL 1.92* 1.63* 1.35* 1.23   CALCIUM mg/dL 9.0 9.0 9.0 8.9   ALK PHOS U/L  --  638*  --   --    ALT U/L  --  64*  --   --    AST U/L  --  97*  --   --        CUTURES:  Lab Results   Component Value Date    URINECX >100,000 cfu/ml Escherichia coli (A) 01/04/2022    URINECX >100,000 cfu/ml Escherichia coli 11/16/2016                 Portions of the record may have been created with voice recognition software. Occasional wrong word or \"sound a like\" substitutions may have occurred due to the inherent limitations of voice recognition software. Read the chart carefully and recognize, using context, where substitutions have occurred.If you have any questions, please contact the dictating provider.    "

## 2024-06-14 NOTE — CONSULTS
This is not a new consult. Patient previously seen in consultation by nephrology 6/1/24.    To be seen in follow up today for ADITYA.

## 2024-06-15 NOTE — PROGRESS NOTES
NEPHROLOGY PROGRESS NOTE   Melany Griffith 73 y.o. female MRN: [id number]  Unit/Bed#: [facility] 555-02 Encounter: [id number]  Reason for Consult: ADITYA     73-year-old female with hx Stage IV lung carcinoma, s/p chemo XRT and immunotherapy, PAF, HFrEF, DM2, CVA p/w mechanical fall to [facility] and tx to [facility].  Nephrology following for ADITYA and hyponatremia.     ASSESSMENT/PLAN:  ADITYA, recurrent:  -Etiology: Previously felt due to CRS, now likely prerenal in the setting of decreased oral intake, third spacing and weight loss  -Recent admission w/ADITYA with creatinine 1.57-->1.16-->1.57-->1.23 on d/c 6/8-->now 1.9 on 6/14  -labs drawn today but discarded and unable to obtain repeat  -Baseline creatinine 0.9-1.1  -workup: UA with trace protein, 0-1 hyaline cast, no hematuria  -Urine chloride <15  -Imaging: Renal ultrasound 6/5/2024 with no hydronephrosis  -BMP in am  -Avoid nephrotoxins, NSAIDs, IV contrast if possible  -Avoid hypotension, maintain MAP >65  -d/w family member at bedside  -Ongoing C discussion with plan for transition to comfort care/hospice by next week     Hyponatremia:  -Etiology: Suspected SIADH in the setting of BuSpar, venlafaxine vs sotorasib, now poor solute intake  -Myeloma workup negative  -Previously received tolvaptan x 2  -serum sodium 133 on 6/14  -Urine chloride <15  -s/p fluids 6/14  -Continue salt tablets 1 g twice daily  -Continue to monitor.  Check BMP in a.m.     Anion gap metabolic acidosis/lactic acidosis:  -s/p sodium bicarbonate bolus 500 mL over 10 hr  -no labs for review, specimen discarded  -BP stable  -Lactate 5.8 on 6/14/2024  -Continue to monitor     Elevated alkaline phosphatase:  -RUQ US per primary team  -Workup and management per primary team     Mild anemia:  -Hgb 11.2 at goal  -Continue to trend CBC  -Transfuse for Hgb <7.0     Chronic diastolic congestive heart failure:  -echo:  EF 60% with G1 DD  -Volume status with BLE edema, suspect third spacing due to hypoalbuminemia in the  setting of poor oral intake  -home diuretics: On torsemide 10 mg as needed but has not received yet  -fluid restriction, 2 gm low sodium diet  -strict I/Os, daily weights  -management per primary team     NSCLS:  -S/p chemo and immunotherapy on sotorasib  -Unclear if sotorasib contributing to rising creatinine  -Recommend discussing with hematology  -Ongoing GOC discussion by primary team     Plan Summary:  -no labs for review  -encourage oral intake.   -ongoing GOC discussion. Trending towards hospice.  Anticipating possible transition by next week  -check BMP in am     SUBJECTIVE:  Periods of confusion today. No labs for review.  Poor oral intake and General decline persists.  VSS    A complete review of systems was performed. Pertinent positives and negatives noted in the HPI. Otherwise the review of systems was negative.  OBJECTIVE:  Current Weight:    There were no vitals filed for this visit.  No intake or output data in the 24 hours ending 06/16/24 1218    General:  Awake, alert but confused.  appears comfortable and in no acute distress.  Nontoxic.  Skin:  No rash, warm, good skin turgor   Eyes:  PERRL, EOMI, sclerae nonicteric.  no periorbital edema   ENT:  Moist mucous membranes  Neck:  Trachea midline, symmetric.  No JVD.  Chest:  Clear to auscultation bilaterally without wheezes, crackles or rhonchi  CVS:  Regular rate and rhythm without murmur, gallop or rub.  S1 and S2 identified and normal.  No S3, S4.   Abdomen:  Soft, nontender, nondistended without masses.  Normal bowel sounds x 4 quadrants.  No bruit.  Extremities:  Warm, pink, motor and sensory intact and well perfused.  No cyanosis, pallor.  2+ BLE edema.  Neuro:  Awake, alert, fatigued  Grossly intact  Psych:  confused    Medications:  No current facility-administered medications for this encounter.    Laboratory Results:  Results from last 7 days   Lab Units 06/14/24  0500 06/13/24  0712 06/10/24  0802   WBC Thousand/uL  --  15.04*  --     HEMOGLOBIN g/dL  --  11.2*  --    HEMATOCRIT %  --  36.9  --    PLATELETS Thousands/uL  --  389  --    SODIUM mmol/L 133* 132* 132*   POTASSIUM mmol/L 4.9 4.2 4.0   CHLORIDE mmol/L 96 95* 93*   CO2 mmol/L 19* 21 25   BUN mg/dL 60* 49* 38*   CREATININE mg/dL 1.92* 1.63* 1.35*   CALCIUM mg/dL 9.0 9.0 9.0       I have personally reviewed the blood work as stated above and in my note.  I have personally reviewed internal Medicine, co-consultants and previous nephrology notes.

## 2024-06-15 NOTE — PROGRESS NOTES
NEPHROLOGY PROGRESS NOTE   Melany Griffith 73 y.o. female MRN: [id number]  Unit/Bed#: [facility] 555-02 Encounter: [id number]  Reason for Consult: ADITYA    73-year-old female with hx Stage IV lung carcinoma, s/p chemo XRT and immunotherapy, PAF, HFrEF, DM2, CVA p/w mechanical fall to [facility] and tx to [facility].  Nephrology following for ADITYA and hyponatremia.    ASSESSMENT/PLAN:  ADITYA, recurrent:  -Etiology: Previously felt due to CRS, now likely prerenal in the setting of decreased oral intake, third spacing and weight loss  -Recent admission w/ADITYA with creatinine 1.57-->1.16-->1.57-->1.23 on d/c 6/8-->now 1.9 on 6/14  -No labs today  -Baseline creatinine 0.9-1.1  -workup: UA with trace protein, 0-1 hyaline cast, no hematuria  -Urine chloride <15  -Imaging: Renal ultrasound 6/5/2024 with no hydronephrosis  -Renal function worsening in the setting of poor oral intake, FTT  -Check BMP in a.m.  -Avoid nephrotoxins, NSAIDs, IV contrast if possible  -Avoid hypotension, maintain MAP >65  -Ongoing GOC discussion with possible transition to comfort care/hospice by next week    Hyponatremia:  -Etiology: Suspected SIADH in the setting of BuSpar, venlafaxine vs sotorasib  -Myeloma workup negative  -Previously received tolvaptan x 2  -Most recent serum sodium 133 on 6/14  -Urine chloride <15  -s/p fluids 6/14  -Continue salt tablets 1 g twice daily  -Continue to monitor.  Check BMP in a.m.    Anion gap metabolic acidosis/lactic acidosis:  -s/p sodium bicarbonate bolus 500 mL over 10 hr  -No labs for review today  -Lactate 5.8 on 6/14/2024  -Check BMP in a.m.    Elevated alkaline phosphatase:  -RUQ US per primary team  -Workup and management per primary team    Mild anemia:  -Hgb 11.2  -Continue to monitor.  CBC for 6/17/2024  -Transfuse for Hgb <7.0    Chronic diastolic congestive heart failure:  -compensated  -echo:  EF 60% with G1 DD  -Volume status with BLE edema, suspect third spacing due to hypoalbuminemia in the setting of poor oral  intake  -home diuretics: On torsemide 10 mg as needed but has not received yet  -fluid restriction, 2 gm low sodium diet  -strict I/Os, daily weights  -management per primary team    NSCLS:  -S/p chemo and immunotherapy on sotorasib  -Unclear if sotorasib contributing to rising creatinine  -Recommend discussing with hematology  -Ongoing GOC discussion by primary team    Plan Summary:  -check BMP  -encourage oral intake. Suspect will require ongoing IVF especially in setting of recent acidosis.  No labs for review  -ongoing GOC discussion. Trending towards hospice.  Anticipating possible transition by next week    SUBJECTIVE:  Patient lethargic, fatigued.  Poor oral intake persists.  Persistent lower extremity edema.  BP stable without hypotension.  No labs for review.  VSS    A complete review of systems was performed. Pertinent positives and negatives noted in the HPI. Otherwise the review of systems was negative.  OBJECTIVE:  Current Weight:    There were no vitals filed for this visit.  No intake or output data in the 24 hours ending 06/15/24 1240    General:  Awake, alert, fatigues, appears comfortable and in no acute distress.   Skin:  No rash, warm, good skin turgor   Eyes:  PERRL, EOMI, sclerae nonicteric.  no periorbital edema   ENT:  Moist mucous membranes  Neck:  Trachea midline, symmetric.  No JVD.  Chest:  Clear to auscultation bilaterally without wheezes, crackles or rhonchi  CVS:  Regular rate and rhythm without murmur, gallop or rub.  S1 and S2 identified and normal.  No S3, S4.   Abdomen:  Soft, nontender, nondistended without masses.  Normal bowel sounds x 4 quadrants.  No bruit.  Extremities:  Warm, pink, motor and sensory intact and well perfused.  No cyanosis, pallor.  2+ BLE edema.  Neuro:  Awake, alert, oriented x3.  Grossly intact  Psych:  Appropriate affect.  Mentating appropriately.  Normal mental status exam      Medications:  No current facility-administered medications for this  encounter.    Laboratory Results:  Results from last 7 days   Lab Units 06/14/24  0500 06/13/24  0712 06/10/24  0802   WBC Thousand/uL  --  15.04*  --    HEMOGLOBIN g/dL  --  11.2*  --    HEMATOCRIT %  --  36.9  --    PLATELETS Thousands/uL  --  389  --    SODIUM mmol/L 133* 132* 132*   POTASSIUM mmol/L 4.9 4.2 4.0   CHLORIDE mmol/L 96 95* 93*   CO2 mmol/L 19* 21 25   BUN mg/dL 60* 49* 38*   CREATININE mg/dL 1.92* 1.63* 1.35*   CALCIUM mg/dL 9.0 9.0 9.0       I have personally reviewed the blood work as stated above and in my note.  I have personally reviewed internal Medicine, co-consultants and previous nephrology notes.

## 2024-06-16 NOTE — PROGRESS NOTES
"Per nursing, patient is having breathing issues, lethargic. Informed this provider that there were conversation about Hospice but were not confirmed and family has not expressed in proceeding with Hospice evaluation as of last Friday. Reviewed V/S and recent labs on file and medication list.    Spoke to daughter (Liz) today regarding nursing assessment and abnormal V/S (1530PM): T97.1F -P150 -R23 SpO2: 95% RA BP: 96/62. Per nursing, patient is lethargic, poorly eating and drinking in SNF to which the daughter has acknowledged. Discussed about last BMP showed a significantly elevated (6/14/204) Crea: 1.982/ BUN: 60/ eGFR: 25. There were multiple attempts for follow-up labs but were not drawn due to patient being a hard stick and specimen cannot be collected - rescheduled to be drawn on Monday 6/17/2024. Spoke to daughter about Hospice evaluation and she is agreeable, \" I want  it done as soon as possible. I want her home on Hospice\". Daughter is agreeable to D/C scheduled labs tomorrow (6/16/2024) and continue with conservative management at this time. Daughter does not want aggressive measures including transfer to acute care setting. Daughter is agreeable to start comfort medication if patient exhibits increasing SOB, restlessness and anxiety: Morphine and Ativan. Daughter also mentioned that her mom is complaining of mouth/ tongue pain.    Updated and spoken to RN assigned to patient (Nathaly). Informed on daughter's decision about Hospice care and to put in order for Hospice consult. Informed to D/C labs for tomorrow. Rn made aware of daughter/ patient report opf mouth/tongue pain - to please assess. Call for update. Will send for script for PRN Morphine 5mg every 6 hours PRN ([facility]). Will update Dr Lilly in AM.      "

## 2024-06-17 NOTE — PROGRESS NOTES
[facility] Care  Facility: [facility]    DISCHARGE SUMMARY  Nursing Home Place of Service: 31: SNF/Short Term Rehab    NAME: Melany Griffith  : 1950 AGE: 73 y.o. SEX: female MRN: [id number]  DATE OF ENCOUNTER: 2024    DATE OF ADMISSION: 24 DATE OF DISCHARGE:Tues 24 DISCHARGE DISPOSITION: home with hospice    HPI: Melany Griffith is a 73 y.o. female with PMH including Afib, HTN, lung cancer, hyponatremia, CHF, DM2, CKD, GERD, depression/anxiety, anemia, CVA     Reason for admission: Patient was hospitalized at [facility] from  to 24  For details of hospitalization, see hospital records including discharge documentation  Briefly, patient hospitalized after mechanical fall at home, trauma workup generally unremarkable; found to have ADITYA on CKD, improved with IV fluids; persistent lower extremity edema thought secondary to sotorasib; evaluated by Nephrology for hyponatremia as well, treated with tolvaptan; case was also evaluated by Neurology (for unwitnessed fall) and Palliative (for malnutrition/low appetite).      Course of stay: Patient was admitted to [facility] for rehabilitation due to physical deconditioning and fall, ADITYA. Significant events during the stay include: general decline/failure to thrive. The patient participated in PT/OT but with limited success.    Patient seen and examined in room  Others present: daughter Liz, son Rito  Patient laying in bed  Appears comfortable, awake, alert, able to converse appropriately  Patient somnolent but wakes at times to participate in conversation and is amenable to plan. Aox2-3, tired and frail-appearing. Majority of discussion was held with her children at bedside. Extensive ACP discussion with them today. Patient formally now comfort measures status with no further aggressive interventions desired. Also spoke with  team, hospice referral done and patient  "accepted, anticipated going home on hospice as per their preference. Family very understanding of her hospice/comfort status, reporting their father had also been on hospice in the past. All questions answered to their satisfaction.    No further questions or acute concerns identified.    Coordinated with hospice nurse.    Lab Review:   6/10: BMP with Na 132 (stable/improved, recent low 127, seems intermittent on review), Cr 1.35 (recent peak 1.57 on 6/6, baseline seems 1.3-1.6), GFR 38 (baseline seems 30s-40s); CBC 6/8 with WBC 14.17 (recent peak 16.83 on 5/28), Hb 10.0 (microcytic, baseline around 9-11); UPEP 6/2 \"No monoclonal bands noted\"; SPEP 6/3 \"Serum immunofixation shows no monoclonal immunoglobulins\"; UA 5/30 with leuks/bacteria (no culture appears done); recent TSH WNL; recent A1c 7.2  6/14: BMP with Na 133, Cr 1.92, GFR 25; blood cx NGTD           EKG 4/29/24: Sinus tachycardia with Premature atrial complexes, LAD, 101bpm, Qtc 477  Echo Jan 2022: EF 60, grade 1 diastolic dysfunction         Assessment/Plan:    Chronic diastolic heart failure (HCC)  Wt Readings from Last 3 Encounters:   06/08/24 70.5 kg (155 lb 6.4 oz)   05/21/24 69.3 kg (152 lb 12.8 oz)   05/20/24 71.4 kg (157 lb 8 oz)           Monitor daily weight - seems fairly stable, no alarming uptrend  Monitor volume status clinically - peripheral edema seems overall stable, no orthopnea  Diuretic held inpatient in setting of ADITYA  Per inpatient Nephrology: to do torsemide 10mg daily as needed PRN weight gain of more than 3 pounds in a day or more than 5 pounds in a week.   Continue beta blocker  Consider weaning/discontinuation of medications if not contributing to comfort as she transitions to home hospice        Paroxysmal atrial fibrillation (HCC)  Continue eliquis for AC  Continue metoprolol for rate  No acute cardiac complaints  Consider weaning/discontinuation of medications if not contributing to comfort as she transitions to home hospice "     Primary hypertension  Monitor BP - generally stable/soft around 90s-120s systolic  Pulse generally within normal range, varies around 60s-100  Avoid hypotension  Continue regimen including metoprolol tartrate 25mg BID, sotalol 80mg BID with hold parameters  6/10 slightly weaned metoprolol dose due to low BP  Transitioning to home hospice, adjust regimen as appropriate for comfort    Adenocarcinoma of lung, stage 4 (HCC)  Stage IV non-small cell lung cancer (diagnosed 2016) w/ osseous metastasis s/p chemotherapy + immunotherapy + RT   No present acute/associated respiratory or pain symptoms  on sotorasib, would stop as transitioning to hospice  Follow up with PCP, Oncology as appropriate if in goals of care; presently transitioning to hospice/comfort care    Acid reflux  -stable  -recommend OOB with meals, sit upright for at least 30 minutes afterwards, avoid trigger foods  -continue to monitor  -continue PPI, adjust to comfort on hospice    Type 2 diabetes mellitus with stage 3 chronic kidney disease, without long-term current use of insulin, unspecified whether stage 3a or 3b CKD (HCC)    Lab Results   Component Value Date    HGBA1C 7.2 (H) 05/08/2024     Stop accuchecks and diabetic medications given transition to comfort measures/hospice status    ADITYA on CKD  Lab Results   Component Value Date    EGFR 25 06/14/2024    EGFR 30 06/13/2024    EGFR 38 06/10/2024    CREATININE 1.92 (H) 06/14/2024    CREATININE 1.63 (H) 06/13/2024    CREATININE 1.35 (H) 06/10/2024       ADITYA on CKD noted inpatient  Evaluated by Nephrology inpatient, concern for cardiorenal vs overdiuresis etiology  Diuretics held/adjusted, see above under CHF, subsequently on torsemide 10mg daily PRN  Nephrology consulted at rehab, recommendations appreciated  However now that patient made comfort measures and going on hospice, family prefers no further workup/labs  Avoid nephrotoxins, NSAIDs as able  Encourage/allow comfort-related PO hydration,  respecting volume status      Anxiety  Noted chronic hx  Mood stable  Supportive care  Continue venlafaxine, buspirone  Benzo as appropriate on hospice service    Anemia in other chronic diseases classified elsewhere  Chronic anemia likely multifactorial in setting of CKD, metastatic cancer, chemotherapy  Microcytic, possibly some iron deficiency component, no recent iron level on file  -monitor on routine labs - seems stable - no further labs as on hospice  -monitor for acute bleed - no present signs  -consider further workup, Heme consult if persistent/worsening  -transfuse PRN Hb <7    Leukemoid reaction  Patient reportedly met SIRS criteria inpatient likely related to UA concerning for infection although no apparent culture done  Treated UTI with ceftriaxone; 3 day course completed on 6/2 as per hospital record  Monitor for acute/recurrent infection - no present signs  Monitor WBC on routine labs - seems fairly stable, continues to be elevated, family wants no further workup  consider further workup if persistent/worsening and if in line with goals of care    Sacral wound  Noted POA  Continue local wound care, pressure offloading, wound care consult at rehab  Continue to monitor, going on hospice at home    Cognitive decline  Per family, some cognitive decline noted since stroke several years ago  Likely underlying cognitive impairment with superimposed hospitalization-related delirium at present  MoCA done at rehab per therapy team score of 12/30  Supportive care, going on hospice  Continue to monitor    History of stroke  Hx of CVA in 2022   Can stop aspirin/statin as she is going on hospice with goal of comfort, would limit extraneous pills    Dyslipidemia  Stopping statin as going on hospice    Elevated LFTs  Elevation of LFTs including AST/ALT and in particular significant alk phos elevation on recent labs (bilirubin remained WNL)  No clear clinical correlation; no appreciable acute abdominal  pain/tenderness nor jaundice  Possibly related to chemotherapy medication? stopping especially as patient transitioning to hospice in the current trajectory  Abd ultrasound done without new/acute etiology identified beyond known mass  Consider further workup/intervention depending on above results and on goals of care - had ordered further workup/labs for 6/17 however were canceled after on-call provider discussed with family regarding transition to comfort measures    Fall  Reported mechanical fall prior to hospitalization, patient denies preceding cardiopulmonary/syncopal symptoms, reported falling backwards due to weakness, denies headstrike/LOC  Trauma workup inpatient generally unremarkable  Going on hospice    Goals of care, counseling/discussion  Extensive ACP discussion including patient (as able) and daughter Liz and son Rito today  Confirmed intent for patient to be made comfort measures at this time, to avoid any aggressive interventions, further workup, or further hospital transfers, and goal for her to be comfortable in the time she has remaining  Accepted to home hospice  Beginning discontinuation of extraneous medications not contributing to comfort    Hyponatremia  Intermittent on labs  Likely SIADH in setting of lung cancer  Limit further workup on hospice           Review of Systems   Constitutional:  Positive for appetite change (declining) and fatigue. Negative for chills, diaphoresis and fever.   HENT:  Negative for drooling, ear pain, hearing loss, sore throat and trouble swallowing.    Eyes:  Negative for pain, discharge, redness, itching and visual disturbance.   Respiratory:  Negative for cough, chest tightness, shortness of breath and wheezing.    Cardiovascular:  Positive for leg swelling (chronic). Negative for chest pain and palpitations.   Gastrointestinal:  Negative for abdominal pain, blood in stool, constipation, diarrhea, nausea and vomiting.   Genitourinary:  Negative for  difficulty urinating, dysuria and hematuria.   Musculoskeletal:  Positive for gait problem. Negative for arthralgias, back pain and neck pain.   Skin:  Positive for wound (sacral). Negative for color change.   Neurological:  Positive for weakness (ongoing). Negative for dizziness, facial asymmetry, speech difficulty and headaches.   Psychiatric/Behavioral:  Negative for agitation, behavioral problems and confusion. The patient is not nervous/anxious and is not hyperactive.    All other systems reviewed and are negative.      Vital Signs:     BP: 121/62 mmHg  6/16/2024 21:44   Temp:97.1 °F  6/16/2024 15:26 Pulse:83 bpm  6/16/2024 21:44   Weight:156.96 Lbs  6/17/2024 06:59   Resp:21 Breaths/min  6/16/2024 15:26 BS:224 mg/dL  6/16/2024 16:50 O2:95 %  6/16/2024 15:30 Pain:1  6/17/2024 14:31       Exam:     Physical Exam  Vitals reviewed.   Constitutional:       General: She is not in acute distress.     Appearance: She is not toxic-appearing or diaphoretic.   HENT:      Head: Normocephalic and atraumatic.      Right Ear: External ear normal.      Left Ear: External ear normal.      Nose: Nose normal. No rhinorrhea.      Mouth/Throat:      Mouth: Mucous membranes are dry.      Pharynx: Oropharynx is clear. No posterior oropharyngeal erythema.   Eyes:      General: No scleral icterus.        Right eye: No discharge.         Left eye: No discharge.      Extraocular Movements: Extraocular movements intact.      Conjunctiva/sclera: Conjunctivae normal.      Pupils: Pupils are equal, round, and reactive to light.   Cardiovascular:      Rate and Rhythm: Normal rate and regular rhythm.   Pulmonary:      Effort: Pulmonary effort is normal. No respiratory distress.      Breath sounds: Normal breath sounds. No wheezing or rales.   Abdominal:      General: Bowel sounds are normal. There is no distension.      Palpations: Abdomen is soft.      Tenderness: There is no abdominal tenderness. There is no right CVA tenderness, left CVA  "tenderness or guarding.   Musculoskeletal:         General: Swelling present. No tenderness.      Cervical back: No rigidity.      Comments: 1-2+ bilateral lower extremity edema   Skin:     General: Skin is warm and dry.      Coloration: Skin is not jaundiced.   Neurological:      General: No focal deficit present.      Mental Status: She is alert. Mental status is at baseline.      Comments: Aox2-3 (to self, to location \"[facility] and [place], not to Month without prompting  Some forgetfulness apparent   Psychiatric:         Mood and Affect: Mood normal.         Behavior: Behavior normal.         Thought Content: Thought content normal.           Discharge Medications: See discharge medication list which was reviewed and signed.    Status at time of discharge: Stable    Discussion with patient/family as appropriate and further instructions:  -Fall precautions  -Aspiration precautions  -Bleeding precautions  -Monitor for signs/symptoms of infection  -Medication list was reviewed and signed  -DME form was completed    Follow-up Recommendations: Please follow-up with your primary care physician within 7-10 days of discharge to review medication changes and current status.     Problem List Follow-up Recommendations:      -Total time spent on this encounter today including documentation and workup review, face to face time, history and exam, and documentation/orders was approximately 60 minutes.  -This note will be copied to [facility] EMR where vitals and medication orders are placed.    Tashi Lilly D.O.  Geriatric Medicine  6/17/2024 4:15 PM    "

## 2024-06-17 NOTE — ASSESSMENT & PLAN NOTE
Reported mechanical fall prior to hospitalization, patient denies preceding cardiopulmonary/syncopal symptoms, reported falling backwards due to weakness, denies headstrike/LOC  Trauma workup inpatient generally unremarkable  Going on hospice

## 2024-06-17 NOTE — ASSESSMENT & PLAN NOTE
Hx of CVA in 2022   Can stop aspirin/statin as she is going on hospice with goal of comfort, would limit extraneous pills

## 2024-06-17 NOTE — ASSESSMENT & PLAN NOTE
Per family, some cognitive decline noted since stroke several years ago  Likely underlying cognitive impairment with superimposed hospitalization-related delirium at present  MoCA done at rehab per therapy team score of 12/30  Supportive care, going on hospice  Continue to monitor

## 2024-06-17 NOTE — ASSESSMENT & PLAN NOTE
Monitor BP - generally stable/soft around 90s-120s systolic  Pulse generally within normal range, varies around 60s-100  Avoid hypotension  Continue regimen including metoprolol tartrate 25mg BID, sotalol 80mg BID with hold parameters  6/10 slightly weaned metoprolol dose due to low BP  Transitioning to home hospice, adjust regimen as appropriate for comfort

## 2024-06-17 NOTE — ASSESSMENT & PLAN NOTE
Continue eliquis for AC  Continue metoprolol for rate  No acute cardiac complaints  Consider weaning/discontinuation of medications if not contributing to comfort as she transitions to home hospice

## 2024-06-17 NOTE — ASSESSMENT & PLAN NOTE
Elevation of LFTs including AST/ALT and in particular significant alk phos elevation on recent labs (bilirubin remained WNL)  No clear clinical correlation; no appreciable acute abdominal pain/tenderness nor jaundice  Possibly related to chemotherapy medication? stopping especially as patient transitioning to hospice in the current trajectory  Abd ultrasound done without new/acute etiology identified beyond known mass  Consider further workup/intervention depending on above results and on goals of care - had ordered further workup/labs for 6/17 however were canceled after on-call provider discussed with family regarding transition to comfort measures

## 2024-06-17 NOTE — ASSESSMENT & PLAN NOTE
Patient reportedly met SIRS criteria inpatient likely related to UA concerning for infection although no apparent culture done  Treated UTI with ceftriaxone; 3 day course completed on 6/2 as per hospital record  Monitor for acute/recurrent infection - no present signs  Monitor WBC on routine labs - seems fairly stable, continues to be elevated, family wants no further workup  consider further workup if persistent/worsening and if in line with goals of care

## 2024-06-17 NOTE — ASSESSMENT & PLAN NOTE
Lab Results   Component Value Date    EGFR 25 06/14/2024    EGFR 30 06/13/2024    EGFR 38 06/10/2024    CREATININE 1.92 (H) 06/14/2024    CREATININE 1.63 (H) 06/13/2024    CREATININE 1.35 (H) 06/10/2024       ADITYA on CKD noted inpatient  Evaluated by Nephrology inpatient, concern for cardiorenal vs overdiuresis etiology  Diuretics held/adjusted, see above under CHF, subsequently on torsemide 10mg daily PRN  Nephrology consulted at rehab, recommendations appreciated  However now that patient made comfort measures and going on hospice, family prefers no further workup/labs  Avoid nephrotoxins, NSAIDs as able  Encourage/allow comfort-related PO hydration, respecting volume status

## 2024-06-17 NOTE — ASSESSMENT & PLAN NOTE
Extensive ACP discussion including patient (as able) and daughter Liz and son Rito today  Confirmed intent for patient to be made comfort measures at this time, to avoid any aggressive interventions, further workup, or further hospital transfers, and goal for her to be comfortable in the time she has remaining  Accepted to home hospice  Beginning discontinuation of extraneous medications not contributing to comfort

## 2024-06-17 NOTE — ASSESSMENT & PLAN NOTE
Stage IV non-small cell lung cancer (diagnosed 2016) w/ osseous metastasis s/p chemotherapy + immunotherapy + RT   No present acute/associated respiratory or pain symptoms  on sotorasib, would stop as transitioning to hospice  Follow up with PCP, Oncology as appropriate if in goals of care; presently transitioning to hospice/comfort care   5

## 2024-06-17 NOTE — QUICK NOTE
Renal note:   Patient now remains under hospice.  We will sign off.  Please call with any questions.  Discussed with primary team.

## 2024-06-17 NOTE — ASSESSMENT & PLAN NOTE
Chronic anemia likely multifactorial in setting of CKD, metastatic cancer, chemotherapy  Microcytic, possibly some iron deficiency component, no recent iron level on file  -monitor on routine labs - seems stable - no further labs as on hospice  -monitor for acute bleed - no present signs  -consider further workup, Heme consult if persistent/worsening  -transfuse PRN Hb <7

## 2024-06-17 NOTE — ASSESSMENT & PLAN NOTE
Noted chronic hx  Mood stable  Supportive care  Continue venlafaxine, buspirone  Benzo as appropriate on hospice service

## 2024-06-17 NOTE — HOSPICE NOTE
RECEIVED HOSPICE REFERRAL. REVIEWED WITH DR. MULTANI AND DR. CALLES. APPROVED RLOC WHICH COULD BE PROVIDED AT HOME OR SNF. SPOKE WITH CONR/JUSTO VIA [facility]. REVIEWED HOSPICE SERVICES AND BENEFITS PER  GUIDELINES. QUESTIONS ANSWERED. SHE IS IN AGREEMENT AND WILL BE SPEAKING WITH [facility]. PER LILLIAN KEMP PT WILL DC TO PT HOME IN [place] WHERE HER AND HER BROTHER WILL BE CARING FOR HER. PER MARTI VENEGAS [facility] CAN START SERVICES TOMORROW. SENT MESSAGE TO [facility] THAT WE WOULD NEED PT TO ARRIVE HOME TOMORROW AM. WILL ORDER HOSPITAL BED FOR DELIVERY TODAY AFTER JUSTO CALLS ME BACK AFTER SHE MEETW WITH [facility] TO CONFIRM.

## 2024-06-17 NOTE — ASSESSMENT & PLAN NOTE
Wt Readings from Last 3 Encounters:   06/08/24 70.5 kg (155 lb 6.4 oz)   05/21/24 69.3 kg (152 lb 12.8 oz)   05/20/24 71.4 kg (157 lb 8 oz)           Monitor daily weight - seems fairly stable, no alarming uptrend  Monitor volume status clinically - peripheral edema seems overall stable, no orthopnea  Diuretic held inpatient in setting of ADITYA  Per inpatient Nephrology: to do torsemide 10mg daily as needed PRN weight gain of more than 3 pounds in a day or more than 5 pounds in a week.   Continue beta blocker  Consider weaning/discontinuation of medications if not contributing to comfort as she transitions to home hospice

## 2024-06-17 NOTE — ASSESSMENT & PLAN NOTE
-stable  -recommend OOB with meals, sit upright for at least 30 minutes afterwards, avoid trigger foods  -continue to monitor  -continue PPI, adjust to comfort on hospice

## 2024-06-17 NOTE — ASSESSMENT & PLAN NOTE
Noted POA  Continue local wound care, pressure offloading, wound care consult at rehab  Continue to monitor, going on hospice at home

## 2024-06-17 NOTE — ASSESSMENT & PLAN NOTE
Lab Results   Component Value Date    HGBA1C 7.2 (H) 05/08/2024     Stop accuchecks and diabetic medications given transition to comfort measures/hospice status

## 2024-06-17 NOTE — HOSPICE NOTE
SPOKE WITH DTDIANA KEMP . SHE IS IN AGREEMENT WITH HOSPICE STARTING TOMORROW. SHE WILL SIGN CONSENTS AT [facility]. [facility] WORKING IN AM TRANSPORT. ORDERED THE FOLLOWING DME FROM [facility] FOR DELIVERY TODAY: HOSPITAL BED 2 SETS LALM HALF RAILS OBT. ORDER #[id number].

## 2024-06-18 ENCOUNTER — PATIENT OUTREACH (OUTPATIENT)
Dept: CASE MANAGEMENT | Facility: OTHER | Age: 74
End: 2024-06-18

## 2024-06-18 NOTE — PROGRESS NOTES
ADT alert received the patient  24. I have removed myself from the care team, updated the Care Coordination note, and closed the episode.

## 2024-11-27 NOTE — ASSESSMENT & PLAN NOTE
· Patient's initial reason for admission was multiple falls also with reports by her daughter of intermittent confusion  · CTH negative  MRI without gadolinium Right basal ganglia and right caudate acute ischemia  Repeat MRI of the brain with gadolinium and MRA imaging both noted to be unremarkable  · Continue Lipitor  · Neuro consult appreciated-- case discussed with them  · Echo unremarkable  · Patient with known h/o Afib no need for Tele  Continue Eliquis AC   Added aspirin per her oncologist Dr Sophie Sánchez for concern of thrombosis caused by chemotherapy  · PT/OT evals noted recommending rehab  · No longer need permissive HTN 8

## 2025-03-24 NOTE — PLAN OF CARE
Problem: Knowledge Deficit  Goal: Patient/family/caregiver demonstrates understanding of disease process, treatment plan, medications, and discharge instructions  Description: Complete learning assessment and assess knowledge base    Interventions:  - Provide teaching at level of understanding  - Provide teaching via preferred learning methods  Outcome: Progressing
Female

## 2025-06-11 ENCOUNTER — DOCUMENTATION (OUTPATIENT)
Dept: OTHER | Facility: HOSPITAL | Age: 75
End: 2025-06-11

## 2025-07-02 NOTE — QUICK NOTE
"Notified by  that daughter Liz was inquiring about hospice options. Called and had lengthy conversation with Liz. She expressed having concerns with low appetite and if rehab is the best option at the time. Daughter stated \"I am not completely sure about hospice.\" Discussed having a goals of care discussion with Palliative. Daughter is agreeable with having said conversation with palliative as she \"does not want to make the wrong decision\" for her mother. All questions were answered at this time.  "
Called and spoke with daughter Liz in regards of patient needing more Sotorasib. Daughter stated she will have someone bring more tablets to the hospital today. All questions were answered at this time.  
Called patient's daughter, Liz, and updated her regarding fall earlier today as well as negative CT scan results.   
Labs reviewed.  Sodium stable at 134.  Creatinine 1.23.  Continue current medications with torsemide 20 mg twice daily.  Discontinue sodium bicarbonate as serum bicarbonate level has now risen to 30.  Plans noted for MRI with gadolinium.  No need for IV fluids before administration of gadolinium.  Discussed with primary team.  
NO MRI due to pt condition.     Unable to scan PATIENT tried to CLIMB out of machine, tried to pull off brain coil, confused, pt was medicated and RN Nely came down and gave 2nd dose of meds, Pt still agitated. Its a safety issue with patient trying to climb out of machine,moving and talking while trying to do scan. pmb  
Sodium dropped to 127- ordered tolvaptan 7.5 mg x one dose.   
PA/NP only visit
PA/NP only visit

## (undated) DEVICE — DRAPE TOWEL: Brand: CONVERTORS

## (undated) DEVICE — CHLORAPREP HI-LITE 26ML ORANGE

## (undated) DEVICE — STERILE ICS MINOR PACK: Brand: CARDINAL HEALTH

## (undated) DEVICE — GLOVE SRG BIOGEL 6.5

## (undated) DEVICE — VIAL DECANTER

## (undated) DEVICE — COVER PROBE INTRAOPERATIVE 6 X 96 IN

## (undated) DEVICE — SYRINGE 5ML LL

## (undated) DEVICE — GLOVE INDICATOR PI UNDERGLOVE SZ 7 BLUE

## (undated) DEVICE — ADHESIVE SKIN HIGH VISCOSITY EXOFIN 1ML

## (undated) DEVICE — BAG DECANTER

## (undated) DEVICE — MINOR PROCEDURE DRAPE: Brand: CONVERTORS

## (undated) DEVICE — ULTRASOUND GEL STERILE FOIL PK

## (undated) DEVICE — SUT VICRYL 3-0 SH 27 IN J416H

## (undated) DEVICE — SPONGE 4 X 4 XRAY 16 PLY STRL LF RFD

## (undated) DEVICE — INTENDED FOR TISSUE SEPARATION, AND OTHER PROCEDURES THAT REQUIRE A SHARP SURGICAL BLADE TO PUNCTURE OR CUT.: Brand: BARD-PARKER SAFETY BLADES SIZE 15, STERILE